# Patient Record
Sex: FEMALE | Race: WHITE | NOT HISPANIC OR LATINO | Employment: OTHER | URBAN - METROPOLITAN AREA
[De-identification: names, ages, dates, MRNs, and addresses within clinical notes are randomized per-mention and may not be internally consistent; named-entity substitution may affect disease eponyms.]

---

## 2018-10-30 ENCOUNTER — CLINICAL SUPPORT (OUTPATIENT)
Dept: ONCOLOGY | Facility: HOSPITAL | Age: 43
End: 2018-10-30

## 2018-10-30 ENCOUNTER — HOSPITAL ENCOUNTER (OUTPATIENT)
Dept: ONCOLOGY | Facility: CLINIC | Age: 43
Setting detail: INFUSION SERIES
Discharge: HOME | End: 2018-10-30
Attending: INTERNAL MEDICINE | Admitting: INTERNAL MEDICINE

## 2018-10-30 NOTE — PROGRESS NOTES
PATIENTS ONCOLOGY RECORD LOCATED IN [facility]      Subjective     Name:  J LUIS KOWALSKI     Date:  10/30/2018  Address:  [address] [place] [address] [place] [facility]  Home:   :  1975 AGE:  43 y.o.        RECORDS OBTAINED:  Patients Oncology Record is located in [facility]

## 2018-11-06 ENCOUNTER — HOSPITAL ENCOUNTER (OUTPATIENT)
Dept: ONCOLOGY | Facility: CLINIC | Age: 43
Setting detail: INFUSION SERIES
Discharge: HOME | End: 2018-11-06
Attending: INTERNAL MEDICINE | Admitting: INTERNAL MEDICINE

## 2018-11-06 ENCOUNTER — CLINICAL SUPPORT (OUTPATIENT)
Dept: ONCOLOGY | Facility: HOSPITAL | Age: 43
End: 2018-11-06

## 2018-11-06 ENCOUNTER — HOSPITAL ENCOUNTER (OUTPATIENT)
Dept: GENERAL RADIOLOGY | Facility: HOSPITAL | Age: 43
Discharge: HOME | End: 2018-11-06
Attending: INTERNAL MEDICINE | Admitting: INTERNAL MEDICINE

## 2018-11-06 ENCOUNTER — HOSPITAL ENCOUNTER (OUTPATIENT)
Dept: ONCOLOGY | Facility: HOSPITAL | Age: 43
Discharge: HOME | End: 2018-11-06
Attending: INTERNAL MEDICINE | Admitting: INTERNAL MEDICINE

## 2018-11-06 LAB
LDH SERPL-CCNC: 132 IU/L (ref 98–192)
URATE SERPL-MCNC: 6.5 MG/DL (ref 2.6–8)

## 2018-11-20 ENCOUNTER — CLINICAL SUPPORT (OUTPATIENT)
Dept: ONCOLOGY | Facility: HOSPITAL | Age: 43
End: 2018-11-20

## 2018-11-20 ENCOUNTER — HOSPITAL ENCOUNTER (OUTPATIENT)
Dept: ONCOLOGY | Facility: CLINIC | Age: 43
Setting detail: INFUSION SERIES
Discharge: HOME | End: 2018-11-20
Attending: INTERNAL MEDICINE | Admitting: INTERNAL MEDICINE

## 2018-11-20 NOTE — PROGRESS NOTES
PATIENTS ONCOLOGY RECORD LOCATED IN [facility]      Subjective     Name:  J LUIS KOWALSKI     Date:  2018  Address:  [address] [place], [place] [address] [facility] [place] [address]  Home: [unfilled]  :  1975 AGE:  43 y.o.        RECORDS OBTAINED:  Patients Oncology Record is located in [facility]

## 2018-12-11 ENCOUNTER — HOSPITAL ENCOUNTER (OUTPATIENT)
Dept: ONCOLOGY | Facility: CLINIC | Age: 43
Setting detail: INFUSION SERIES
Discharge: HOME | End: 2018-12-11
Attending: INTERNAL MEDICINE | Admitting: INTERNAL MEDICINE

## 2018-12-11 ENCOUNTER — CLINICAL SUPPORT (OUTPATIENT)
Dept: ONCOLOGY | Facility: HOSPITAL | Age: 43
End: 2018-12-11

## 2018-12-11 NOTE — PROGRESS NOTES
PATIENTS ONCOLOGY RECORD LOCATED IN [facility]      Subjective     Name:  J LUIS KOWALSKI     Date:  2018  Address:  [address] [place], [place] [address] [facility] [place] [address]  Home: [unfilled]  :  1975 AGE:  43 y.o.        RECORDS OBTAINED:  Patients Oncology Record is located in [facility]

## 2018-12-14 ENCOUNTER — HOSPITAL ENCOUNTER (OUTPATIENT)
Dept: GASTROENTEROLOGY | Facility: HOSPITAL | Age: 43
Setting detail: HOSPITAL OUTPATIENT SURGERY
Discharge: HOME | End: 2018-12-14
Attending: INTERNAL MEDICINE | Admitting: INTERNAL MEDICINE

## 2018-12-14 LAB
AFB STAIN: NORMAL
B PERT DNA SPEC QL NAA+PROBE: NOT DETECTED
BACTERIA SPEC AEROBE CULT: NORMAL
C PNEUM DNA NPH QL NAA+NON-PROBE: NOT DETECTED
CONCENTRATED SMEAR: NORMAL
CONV DIRECT SMEAR: NORMAL
CONV GRANULOCYTES, FLUID: 76 %
CONV MONOCYTES, FLUID: 6 %
CONV RESPNL SPECIMEN: ABNORMAL
FLUAV H1 2009 PAND RNA NPH QL NAA+PROBE: NOT DETECTED
FLUAV H1 HA GENE NPH QL NAA+PROBE: NOT DETECTED
FLUAV H3 RNA NPH QL NAA+PROBE: DETECTED
FLUAV SUBTYP SPEC NAA+PROBE: DETECTED
FLUBV RNA ISLT QL NAA+PROBE: NOT DETECTED
GLUCOSE BLD-MCNC: 305 MG/DL (ref 70–105)
GRAM STN SPEC: NORMAL
HADV DNA SPEC NAA+PROBE: NOT DETECTED
HCOV 229E RNA SPEC QL NAA+PROBE: NOT DETECTED
HCOV HKU1 RNA SPEC QL NAA+PROBE: NOT DETECTED
HCOV NL63 RNA SPEC QL NAA+PROBE: NOT DETECTED
HCOV OC43 RNA SPEC QL NAA+PROBE: DETECTED
HMPV RNA NPH QL NAA+NON-PROBE: NOT DETECTED
HPIV1 RNA ISLT QL NAA+PROBE: NOT DETECTED
HPIV2 RNA SPEC QL NAA+PROBE: NOT DETECTED
HPIV3 RNA NPH QL NAA+PROBE: NOT DETECTED
HPIV4 P GENE NPH QL NAA+PROBE: NOT DETECTED
LYMPHOCYTES NFR FLD MANUAL: 15 %
Lab: NORMAL
M PNEUMO IGG SER IA-ACNC: NOT DETECTED
MESOTHL CELL NFR FLD MANUAL: 3 %
MICRO REPORT STATUS: NORMAL
RHINOVIRUS RNA SPEC NAA+PROBE: NOT DETECTED
RSV RNA NPH QL NAA+NON-PROBE: NOT DETECTED
SPECIMEN SOURCE: NORMAL

## 2018-12-16 LAB
CMV DNA CSF QL NAA+PROBE: NEGATIVE
SPECIMEN SOURCE: NORMAL

## 2018-12-17 LAB
CYTOLOGY SPECIMEN: NORMAL
P JIROVECII DNA # SPEC NAA+PROBE: NEGATIVE {COPIES}/ML
SPECIMEN SOURCE: NORMAL

## 2019-01-03 ENCOUNTER — HOSPITAL ENCOUNTER (OUTPATIENT)
Dept: ONCOLOGY | Facility: CLINIC | Age: 44
Setting detail: INFUSION SERIES
Discharge: HOME | End: 2019-01-03
Attending: INTERNAL MEDICINE | Admitting: INTERNAL MEDICINE

## 2019-01-03 ENCOUNTER — CLINICAL SUPPORT (OUTPATIENT)
Dept: ONCOLOGY | Facility: HOSPITAL | Age: 44
End: 2019-01-03

## 2019-01-03 NOTE — PROGRESS NOTES
PATIENTS ONCOLOGY RECORD LOCATED IN [facility]      Subjective     Name:  J LUIS KOWALSKI     Date:  2019  Address:  [address] [place], [place] [address] [facility] [place] [address]  Home: [unfilled]  :  1975 AGE:  43 y.o.        RECORDS OBTAINED:  Patients Oncology Record is located in [facility]

## 2019-01-06 ENCOUNTER — HOSPITAL ENCOUNTER (OUTPATIENT)
Dept: LAB | Facility: HOSPITAL | Age: 44
Discharge: HOME | End: 2019-01-06
Attending: INTERNAL MEDICINE | Admitting: INTERNAL MEDICINE

## 2019-01-06 LAB
BASOPHILS # BLD AUTO: 0.5 10*3/UL (ref 0–0.2)
BASOPHILS NFR BLD AUTO: 3 % (ref 0–2)
CONV ABS BANDS: 0.5 10*3/UL
CONV ABS IMM GRAN: 0.32 10*3/UL
CONV PLATELETS GIANT IN BLOOD BY LIGHT MICROSCOPY: (no result)
DIFFERENTIAL METHOD BLD: (no result)
EOSINOPHIL # BLD AUTO: 0.6 10*3/UL (ref 0–0.3)
EOSINOPHIL # BLD AUTO: 4 % (ref 0–3)
ERYTHROCYTE [DISTWIDTH] IN BLOOD BY AUTOMATED COUNT: 14.6 % (ref 11.5–14.5)
HCT VFR BLD AUTO: 36 % (ref 35–49)
HGB BLD-MCNC: 12.3 G/DL (ref 12–15)
LYMPHOCYTES # BLD AUTO: 3.4 10*3/UL (ref 0.8–4.8)
LYMPHOCYTES NFR BLD AUTO: 21 % (ref 18–42)
MCH RBC QN AUTO: 27.5 PG (ref 26–32)
MCHC RBC AUTO-ENTMCNC: 34.2 G/DL (ref 32–36)
MCV RBC AUTO: 80.6 FL (ref 80–94)
MONOCYTES # BLD AUTO: 0.2 10*3/UL (ref 0.1–1.3)
MONOCYTES NFR BLD AUTO: 1 % (ref 2–11)
MYELOCYTES NFR BLD MANUAL: 2 %
NEUTROPHILS # BLD AUTO: 10.5 10*3/UL (ref 2.3–8.6)
NEUTROPHILS NFR BLD AUTO: 66 % (ref 50–75)
NEUTS BAND NFR BLD MANUAL: 3 % (ref 0–5)
NRBC BLD AUTO-RTO: 1 /100{WBCS}
PATHOLOGIST REVIEW: (no result)
PLATELET # BLD AUTO: (no result) 10*3/UL
PLATELET # BLD AUTO: (no result) 10*3/UL (ref 150–450)
PLT COMMENT: (no result)
PMV BLD AUTO: 9.8 FL (ref 7.4–10.4)
RBC # BLD AUTO: 4.47 10*6/UL (ref 4–5.4)
WBC # BLD AUTO: 16 10*3/UL (ref 4.5–11.5)
WBC COMMENT: (no result)

## 2019-01-07 ENCOUNTER — HOSPITAL ENCOUNTER (OUTPATIENT)
Dept: ONCOLOGY | Facility: CLINIC | Age: 44
Setting detail: INFUSION SERIES
Discharge: HOME | End: 2019-01-07
Attending: INTERNAL MEDICINE | Admitting: INTERNAL MEDICINE

## 2019-01-07 ENCOUNTER — CLINICAL SUPPORT (OUTPATIENT)
Dept: ONCOLOGY | Facility: HOSPITAL | Age: 44
End: 2019-01-07

## 2019-01-07 NOTE — PROGRESS NOTES
PATIENTS ONCOLOGY RECORD LOCATED IN [employer]      Subjective     Name:  J LUIS KOWALSKI     Date:  2019  Address:  [facility] [place] [address]  Home: [unfilled]  :  1975 AGE:  43 y.o.        RECORDS OBTAINED:  Patients Oncology Record is located in [employer]

## 2019-01-11 ENCOUNTER — HOSPITAL ENCOUNTER (OUTPATIENT)
Dept: ONCOLOGY | Facility: HOSPITAL | Age: 44
Discharge: HOME | End: 2019-01-11
Attending: INTERNAL MEDICINE | Admitting: INTERNAL MEDICINE

## 2019-01-11 ENCOUNTER — CLINICAL SUPPORT (OUTPATIENT)
Dept: ONCOLOGY | Facility: HOSPITAL | Age: 44
End: 2019-01-11

## 2019-01-11 ENCOUNTER — HOSPITAL ENCOUNTER (OUTPATIENT)
Dept: ONCOLOGY | Facility: CLINIC | Age: 44
Setting detail: INFUSION SERIES
Discharge: HOME | End: 2019-01-11
Attending: INTERNAL MEDICINE | Admitting: INTERNAL MEDICINE

## 2019-01-11 NOTE — PROGRESS NOTES
PATIENTS ONCOLOGY RECORD LOCATED IN [employer]      Subjective     Name:  J LUIS KOWALSKI     Date:  2019  Address:  [facility] [place] [address]  Home: [unfilled]  :  1975 AGE:  43 y.o.        RECORDS OBTAINED:  Patients Oncology Record is located in [employer]

## 2019-01-13 ENCOUNTER — HOSPITAL ENCOUNTER (OUTPATIENT)
Dept: LAB | Facility: HOSPITAL | Age: 44
Discharge: HOME | End: 2019-01-13
Attending: INTERNAL MEDICINE | Admitting: INTERNAL MEDICINE

## 2019-01-13 LAB
BASOPHILS # BLD AUTO: 0.1 10*3/UL (ref 0–0.2)
BASOPHILS NFR BLD AUTO: 1 % (ref 0–2)
DIFFERENTIAL METHOD BLD: (no result)
EOSINOPHIL # BLD AUTO: 0.1 10*3/UL (ref 0–0.3)
EOSINOPHIL # BLD AUTO: 1 % (ref 0–3)
ERYTHROCYTE [DISTWIDTH] IN BLOOD BY AUTOMATED COUNT: 14.8 % (ref 11.5–14.5)
HCT VFR BLD AUTO: 35.2 % (ref 35–49)
HGB BLD-MCNC: 11.4 G/DL (ref 12–15)
LYMPHOCYTES # BLD AUTO: 2.6 10*3/UL (ref 0.8–4.8)
LYMPHOCYTES NFR BLD AUTO: 28 % (ref 18–42)
MCH RBC QN AUTO: 26.7 PG (ref 26–32)
MCHC RBC AUTO-ENTMCNC: 32.5 G/DL (ref 32–36)
MCV RBC AUTO: 82.4 FL (ref 80–94)
MONOCYTES # BLD AUTO: 0.1 10*3/UL (ref 0.1–1.3)
MONOCYTES NFR BLD AUTO: 2 % (ref 2–11)
NEUTROPHILS # BLD AUTO: 6.3 10*3/UL (ref 2.3–8.6)
NEUTROPHILS NFR BLD AUTO: 68 % (ref 50–75)
NRBC BLD AUTO-RTO: 0 /100{WBCS}
NRBC/RBC NFR BLD MANUAL: 0 10*3/UL
PLATELET # BLD AUTO: (no result) 10*3/UL
PLATELET # BLD AUTO: (no result) 10*3/UL
PLATELET # BLD AUTO: (no result) 10*3/UL (ref 150–450)
PMV BLD AUTO: 9.8 FL (ref 7.4–10.4)
RBC # BLD AUTO: 4.27 10*6/UL (ref 4–5.4)
WBC # BLD AUTO: 9.2 10*3/UL (ref 4.5–11.5)

## 2019-01-17 ENCOUNTER — HOSPITAL ENCOUNTER (OUTPATIENT)
Dept: ONCOLOGY | Facility: HOSPITAL | Age: 44
Discharge: HOME | End: 2019-01-17
Attending: INTERNAL MEDICINE | Admitting: INTERNAL MEDICINE

## 2019-01-17 ENCOUNTER — CLINICAL SUPPORT (OUTPATIENT)
Dept: ONCOLOGY | Facility: HOSPITAL | Age: 44
End: 2019-01-17

## 2019-01-17 ENCOUNTER — HOSPITAL ENCOUNTER (OUTPATIENT)
Dept: ONCOLOGY | Facility: CLINIC | Age: 44
Setting detail: INFUSION SERIES
Discharge: HOME | End: 2019-01-17
Attending: INTERNAL MEDICINE | Admitting: INTERNAL MEDICINE

## 2019-01-17 LAB
ALBUMIN SERPL-MCNC: 3.7 G/DL (ref 3.5–4.8)
ALBUMIN/GLOB SERPL: 1.1 {RATIO} (ref 1–1.7)
ALP SERPL-CCNC: 67 IU/L (ref 32–91)
ALT SERPL-CCNC: 21 IU/L (ref 14–54)
ANION GAP SERPL CALC-SCNC: 15.6 MMOL/L (ref 10–20)
AST SERPL-CCNC: 19 IU/L (ref 15–41)
BILIRUB SERPL-MCNC: 1 MG/DL (ref 0.3–1.2)
BUN SERPL-MCNC: 6 MG/DL (ref 8–20)
BUN/CREAT SERPL: 10 (ref 5.4–26.2)
CALCIUM SERPL-MCNC: 9.3 MG/DL (ref 8.9–10.3)
CHLORIDE SERPL-SCNC: 99 MMOL/L (ref 101–111)
CONV CO2: 21 MMOL/L (ref 22–32)
CONV TOTAL PROTEIN: 7.1 G/DL (ref 6.1–7.9)
CREAT UR-MCNC: 0.6 MG/DL (ref 0.4–1)
GLOBULIN UR ELPH-MCNC: 3.4 G/DL (ref 2.5–3.8)
GLUCOSE SERPL-MCNC: 237 MG/DL (ref 65–99)
MAGNESIUM SERPL-MCNC: 1.7 MG/DL (ref 1.8–2.5)
POTASSIUM SERPL-SCNC: 3.6 MMOL/L (ref 3.6–5.1)
SODIUM SERPL-SCNC: 132 MMOL/L (ref 136–144)

## 2019-01-17 NOTE — PROGRESS NOTES
PATIENTS ONCOLOGY RECORD LOCATED IN [employer]      Subjective     Name:  J LUIS KOWALSKI     Date:  2019  Address:  [facility] [place] [address]  Home: [unfilled]  :  1975 AGE:  43 y.o.        RECORDS OBTAINED:  Patients Oncology Record is located in [employer]

## 2019-01-21 ENCOUNTER — HOSPITAL ENCOUNTER (OUTPATIENT)
Dept: ONCOLOGY | Facility: CLINIC | Age: 44
Setting detail: INFUSION SERIES
Discharge: HOME | End: 2019-01-21
Attending: INTERNAL MEDICINE | Admitting: INTERNAL MEDICINE

## 2019-01-21 ENCOUNTER — HOSPITAL ENCOUNTER (OUTPATIENT)
Dept: ONCOLOGY | Facility: HOSPITAL | Age: 44
Discharge: HOME | End: 2019-01-21
Attending: INTERNAL MEDICINE | Admitting: INTERNAL MEDICINE

## 2019-01-21 ENCOUNTER — CLINICAL SUPPORT (OUTPATIENT)
Dept: ONCOLOGY | Facility: HOSPITAL | Age: 44
End: 2019-01-21

## 2019-01-21 LAB
ALBUMIN SERPL-MCNC: 3.5 G/DL (ref 3.5–4.8)
ALBUMIN/GLOB SERPL: 1.1 {RATIO} (ref 1–1.7)
ALP SERPL-CCNC: 61 IU/L (ref 32–91)
ALT SERPL-CCNC: 16 IU/L (ref 14–54)
ANION GAP SERPL CALC-SCNC: 14.8 MMOL/L (ref 10–20)
AST SERPL-CCNC: 17 IU/L (ref 15–41)
BILIRUB SERPL-MCNC: 0.9 MG/DL (ref 0.3–1.2)
BUN SERPL-MCNC: 9 MG/DL (ref 8–20)
BUN/CREAT SERPL: 12.9 (ref 5.4–26.2)
CALCIUM SERPL-MCNC: 8.9 MG/DL (ref 8.9–10.3)
CHLORIDE SERPL-SCNC: 104 MMOL/L (ref 101–111)
CONV CO2: 18 MMOL/L (ref 22–32)
CONV TOTAL PROTEIN: 6.7 G/DL (ref 6.1–7.9)
CREAT UR-MCNC: 0.7 MG/DL (ref 0.4–1)
GLOBULIN UR ELPH-MCNC: 3.2 G/DL (ref 2.5–3.8)
GLUCOSE SERPL-MCNC: 280 MG/DL (ref 65–99)
POTASSIUM SERPL-SCNC: 3.8 MMOL/L (ref 3.6–5.1)
SODIUM SERPL-SCNC: 133 MMOL/L (ref 136–144)

## 2019-01-21 NOTE — PROGRESS NOTES
PATIENTS ONCOLOGY RECORD LOCATED IN [employer]      Subjective     Name:  J LUIS KOWALSKI     Date:  2019  Address:  [facility] [place] [address]  Home: [unfilled]  :  1975 AGE:  43 y.o.        RECORDS OBTAINED:  Patients Oncology Record is located in [facility]

## 2019-01-24 ENCOUNTER — CLINICAL SUPPORT (OUTPATIENT)
Dept: ONCOLOGY | Facility: HOSPITAL | Age: 44
End: 2019-01-24

## 2019-01-24 ENCOUNTER — HOSPITAL ENCOUNTER (OUTPATIENT)
Dept: ONCOLOGY | Facility: HOSPITAL | Age: 44
Discharge: HOME | End: 2019-01-24
Attending: INTERNAL MEDICINE | Admitting: INTERNAL MEDICINE

## 2019-01-24 ENCOUNTER — HOSPITAL ENCOUNTER (OUTPATIENT)
Dept: ONCOLOGY | Facility: CLINIC | Age: 44
Setting detail: INFUSION SERIES
Discharge: HOME | End: 2019-01-24
Attending: INTERNAL MEDICINE | Admitting: INTERNAL MEDICINE

## 2019-01-24 LAB
ALBUMIN SERPL-MCNC: 3.6 G/DL (ref 3.5–4.8)
ALBUMIN/GLOB SERPL: 1.2 {RATIO} (ref 1–1.7)
ALP SERPL-CCNC: 67 IU/L (ref 32–91)
ALT SERPL-CCNC: 19 IU/L (ref 14–54)
ANION GAP SERPL CALC-SCNC: 16.4 MMOL/L (ref 10–20)
AST SERPL-CCNC: 21 IU/L (ref 15–41)
BILIRUB SERPL-MCNC: 1.1 MG/DL (ref 0.3–1.2)
BUN SERPL-MCNC: 7 MG/DL (ref 8–20)
BUN/CREAT SERPL: 11.7 (ref 5.4–26.2)
CALCIUM SERPL-MCNC: 8.6 MG/DL (ref 8.9–10.3)
CHLORIDE SERPL-SCNC: 100 MMOL/L (ref 101–111)
CONV CO2: 20 MMOL/L (ref 22–32)
CONV TOTAL PROTEIN: 6.7 G/DL (ref 6.1–7.9)
CREAT UR-MCNC: 0.6 MG/DL (ref 0.4–1)
GLOBULIN UR ELPH-MCNC: 3.1 G/DL (ref 2.5–3.8)
GLUCOSE SERPL-MCNC: 155 MG/DL (ref 65–99)
POTASSIUM SERPL-SCNC: 3.4 MMOL/L (ref 3.6–5.1)
SODIUM SERPL-SCNC: 133 MMOL/L (ref 136–144)

## 2019-01-24 NOTE — PROGRESS NOTES
PATIENTS ONCOLOGY RECORD LOCATED IN [employer]      Subjective     Name:  J LUIS KOWALSKI     Date:  2019  Address:  [facility] [place] [address]  Home: [unfilled]  :  1975 AGE:  43 y.o.        RECORDS OBTAINED:  Patients Oncology Record is located in [employer]

## 2019-01-29 ENCOUNTER — HOSPITAL ENCOUNTER (OUTPATIENT)
Dept: ONCOLOGY | Facility: CLINIC | Age: 44
Setting detail: INFUSION SERIES
Discharge: HOME | End: 2019-01-29
Attending: INTERNAL MEDICINE | Admitting: INTERNAL MEDICINE

## 2019-01-29 ENCOUNTER — CLINICAL SUPPORT (OUTPATIENT)
Dept: ONCOLOGY | Facility: HOSPITAL | Age: 44
End: 2019-01-29

## 2019-01-29 ENCOUNTER — HOSPITAL ENCOUNTER (OUTPATIENT)
Dept: ONCOLOGY | Facility: HOSPITAL | Age: 44
Discharge: HOME | End: 2019-01-29
Attending: INTERNAL MEDICINE | Admitting: INTERNAL MEDICINE

## 2019-01-29 LAB
ALBUMIN SERPL-MCNC: 3.7 G/DL (ref 3.5–4.8)
ALBUMIN/GLOB SERPL: 1.2 {RATIO} (ref 1–1.7)
ALP SERPL-CCNC: 63 IU/L (ref 32–91)
ALT SERPL-CCNC: 17 IU/L (ref 14–54)
ANION GAP SERPL CALC-SCNC: 15.6 MMOL/L (ref 10–20)
AST SERPL-CCNC: 18 IU/L (ref 15–41)
BILIRUB SERPL-MCNC: 1 MG/DL (ref 0.3–1.2)
BUN SERPL-MCNC: 5 MG/DL (ref 8–20)
BUN/CREAT SERPL: 8.3 (ref 5.4–26.2)
CALCIUM SERPL-MCNC: 9.1 MG/DL (ref 8.9–10.3)
CHLORIDE SERPL-SCNC: 102 MMOL/L (ref 101–111)
CONV CO2: 19 MMOL/L (ref 22–32)
CONV TOTAL PROTEIN: 6.8 G/DL (ref 6.1–7.9)
CREAT UR-MCNC: 0.6 MG/DL (ref 0.4–1)
GLOBULIN UR ELPH-MCNC: 3.1 G/DL (ref 2.5–3.8)
GLUCOSE SERPL-MCNC: 249 MG/DL (ref 65–99)
POTASSIUM SERPL-SCNC: 3.6 MMOL/L (ref 3.6–5.1)
SODIUM SERPL-SCNC: 133 MMOL/L (ref 136–144)

## 2019-01-29 NOTE — PROGRESS NOTES
PATIENTS ONCOLOGY RECORD LOCATED IN [employer]      Subjective     Name:  J LUIS KOWALSKI     Date:  2019  Address:  [facility] [place] [address]  Home: [unfilled]  :  1975 AGE:  43 y.o.        RECORDS OBTAINED:  Patients Oncology Record is located in [employer]

## 2019-01-31 ENCOUNTER — HOSPITAL ENCOUNTER (OUTPATIENT)
Dept: ONCOLOGY | Facility: CLINIC | Age: 44
Setting detail: INFUSION SERIES
Discharge: HOME | End: 2019-01-31
Attending: INTERNAL MEDICINE | Admitting: INTERNAL MEDICINE

## 2019-01-31 ENCOUNTER — CLINICAL SUPPORT (OUTPATIENT)
Dept: ONCOLOGY | Facility: HOSPITAL | Age: 44
End: 2019-01-31

## 2019-01-31 NOTE — PROGRESS NOTES
PATIENTS ONCOLOGY RECORD LOCATED IN [employer]      Subjective     Name:  J LUIS KOWALSKI     Date:  2019  Address:  [facility] [place] [address]  Home: [unfilled]  :  1975 AGE:  43 y.o.        RECORDS OBTAINED:  Patients Oncology Record is located in [employer]

## 2019-02-04 ENCOUNTER — HOSPITAL ENCOUNTER (OUTPATIENT)
Dept: ONCOLOGY | Facility: CLINIC | Age: 44
Setting detail: INFUSION SERIES
Discharge: HOME | End: 2019-02-04
Attending: INTERNAL MEDICINE | Admitting: INTERNAL MEDICINE

## 2019-02-04 ENCOUNTER — CLINICAL SUPPORT (OUTPATIENT)
Dept: ONCOLOGY | Facility: HOSPITAL | Age: 44
End: 2019-02-04

## 2019-02-04 NOTE — PROGRESS NOTES
PATIENTS ONCOLOGY RECORD LOCATED IN [employer]      Subjective     Name:  J LUIS KOWALSKI     Date:  2019  Address:  [facility] [place] [address]  Home: [unfilled]  :  1975 AGE:  43 y.o.        RECORDS OBTAINED:  Patients Oncology Record is located in [employer]

## 2019-02-06 ENCOUNTER — CLINICAL SUPPORT (OUTPATIENT)
Dept: ONCOLOGY | Facility: HOSPITAL | Age: 44
End: 2019-02-06

## 2019-02-06 ENCOUNTER — HOSPITAL ENCOUNTER (OUTPATIENT)
Dept: LAB | Facility: HOSPITAL | Age: 44
Discharge: HOME | End: 2019-02-06
Attending: NURSE PRACTITIONER | Admitting: NURSE PRACTITIONER

## 2019-02-06 ENCOUNTER — HOSPITAL ENCOUNTER (OUTPATIENT)
Dept: ONCOLOGY | Facility: CLINIC | Age: 44
Setting detail: INFUSION SERIES
Discharge: HOME | End: 2019-02-06
Attending: INTERNAL MEDICINE | Admitting: INTERNAL MEDICINE

## 2019-02-06 NOTE — PROGRESS NOTES
PATIENTS ONCOLOGY RECORD LOCATED IN [employer]      Subjective     Name:  J LUIS KOWALSKI     Date:  2019  Address:  [facility] [place] [address]  Home: [unfilled]  :  1975 AGE:  43 y.o.        RECORDS OBTAINED:  Patients Oncology Record is located in [employer]

## 2019-02-07 ENCOUNTER — HOSPITAL ENCOUNTER (OUTPATIENT)
Dept: CT IMAGING | Facility: HOSPITAL | Age: 44
Discharge: HOME | End: 2019-02-07
Attending: INTERNAL MEDICINE | Admitting: INTERNAL MEDICINE

## 2019-02-11 ENCOUNTER — CLINICAL SUPPORT (OUTPATIENT)
Dept: ONCOLOGY | Facility: HOSPITAL | Age: 44
End: 2019-02-11

## 2019-02-11 ENCOUNTER — HOSPITAL ENCOUNTER (OUTPATIENT)
Dept: ONCOLOGY | Facility: CLINIC | Age: 44
Setting detail: INFUSION SERIES
Discharge: HOME | End: 2019-02-11
Attending: INTERNAL MEDICINE | Admitting: INTERNAL MEDICINE

## 2019-02-11 NOTE — PROGRESS NOTES
PATIENTS ONCOLOGY RECORD LOCATED IN [employer]      Subjective     Name:  J LUIS KOWALSKI     Date:  2019  Address:  [facility] [place] [address]  Home: [unfilled]  :  1975 AGE:  43 y.o.        RECORDS OBTAINED:  Patients Oncology Record is located in [employer]

## 2019-02-13 ENCOUNTER — HOSPITAL ENCOUNTER (OUTPATIENT)
Dept: ONCOLOGY | Facility: CLINIC | Age: 44
Setting detail: INFUSION SERIES
Discharge: HOME | End: 2019-02-13
Attending: INTERNAL MEDICINE | Admitting: INTERNAL MEDICINE

## 2019-02-13 ENCOUNTER — CLINICAL SUPPORT (OUTPATIENT)
Dept: ONCOLOGY | Facility: HOSPITAL | Age: 44
End: 2019-02-13

## 2019-02-13 NOTE — PROGRESS NOTES
PATIENTS ONCOLOGY RECORD LOCATED IN [employer]      Subjective     Name:  J LUIS KOWALSKI     Date:  2019  Address:  [facility] [place] [address]  Home: [unfilled]  :  1975 AGE:  43 y.o.        RECORDS OBTAINED:  Patients Oncology Record is located in [email or web address]

## 2019-02-22 ENCOUNTER — HOSPITAL ENCOUNTER (OUTPATIENT)
Dept: ONCOLOGY | Facility: CLINIC | Age: 44
Setting detail: INFUSION SERIES
Discharge: HOME | End: 2019-02-22
Attending: INTERNAL MEDICINE | Admitting: INTERNAL MEDICINE

## 2019-02-22 ENCOUNTER — HOSPITAL ENCOUNTER (OUTPATIENT)
Dept: ONCOLOGY | Facility: HOSPITAL | Age: 44
Discharge: HOME | End: 2019-02-22
Attending: INTERNAL MEDICINE | Admitting: INTERNAL MEDICINE

## 2019-02-22 ENCOUNTER — CLINICAL SUPPORT (OUTPATIENT)
Dept: ONCOLOGY | Facility: HOSPITAL | Age: 44
End: 2019-02-22

## 2019-02-22 LAB
ALBUMIN SERPL-MCNC: 3.8 G/DL (ref 3.5–4.8)
ALBUMIN/GLOB SERPL: 1.4 {RATIO} (ref 1–1.7)
ALP SERPL-CCNC: 70 IU/L (ref 32–91)
ALT SERPL-CCNC: 21 IU/L (ref 14–54)
ANION GAP SERPL CALC-SCNC: 16.8 MMOL/L (ref 10–20)
AST SERPL-CCNC: 18 IU/L (ref 15–41)
BILIRUB SERPL-MCNC: 2.3 MG/DL (ref 0.3–1.2)
BUN SERPL-MCNC: 7 MG/DL (ref 8–20)
BUN/CREAT SERPL: 11.7 (ref 5.4–26.2)
CALCIUM SERPL-MCNC: 9.5 MG/DL (ref 8.9–10.3)
CHLORIDE SERPL-SCNC: 100 MMOL/L (ref 101–111)
CONV CO2: 21 MMOL/L (ref 22–32)
CONV TOTAL PROTEIN: 6.6 G/DL (ref 6.1–7.9)
CREAT UR-MCNC: 0.6 MG/DL (ref 0.4–1)
GLOBULIN UR ELPH-MCNC: 2.8 G/DL (ref 2.5–3.8)
GLUCOSE SERPL-MCNC: 363 MG/DL (ref 65–99)
POTASSIUM SERPL-SCNC: 3.8 MMOL/L (ref 3.6–5.1)
SODIUM SERPL-SCNC: 134 MMOL/L (ref 136–144)

## 2019-02-22 NOTE — PROGRESS NOTES
PATIENTS ONCOLOGY RECORD LOCATED IN [employer]      Subjective     Name:  J LUIS KOWALSKI     Date:  2019  Address:  [facility] [place] [address]  Home: [unfilled]  :  1975 AGE:  43 y.o.        RECORDS OBTAINED:  Patients Oncology Record is located in [email or web address]

## 2019-03-13 ENCOUNTER — HOSPITAL ENCOUNTER (OUTPATIENT)
Dept: ONCOLOGY | Facility: CLINIC | Age: 44
Setting detail: INFUSION SERIES
Discharge: HOME | End: 2019-03-13
Attending: INTERNAL MEDICINE | Admitting: INTERNAL MEDICINE

## 2019-03-13 ENCOUNTER — CLINICAL SUPPORT (OUTPATIENT)
Dept: ONCOLOGY | Facility: HOSPITAL | Age: 44
End: 2019-03-13

## 2019-03-13 ENCOUNTER — HOSPITAL ENCOUNTER (OUTPATIENT)
Dept: ONCOLOGY | Facility: HOSPITAL | Age: 44
Discharge: HOME | End: 2019-03-13
Attending: INTERNAL MEDICINE | Admitting: INTERNAL MEDICINE

## 2019-03-13 NOTE — PROGRESS NOTES
PATIENTS ONCOLOGY RECORD LOCATED IN [employer]      Subjective     Name:  J LUIS KOWALSKI     Date:  2019  Address:  [facility] [place] [address]  Home: [unfilled]  :  1975 AGE:  43 y.o.        RECORDS OBTAINED:  Patients Oncology Record is located in [facility]

## 2019-03-27 ENCOUNTER — CLINICAL SUPPORT (OUTPATIENT)
Dept: ONCOLOGY | Facility: HOSPITAL | Age: 44
End: 2019-03-27

## 2019-03-27 ENCOUNTER — HOSPITAL ENCOUNTER (OUTPATIENT)
Dept: ONCOLOGY | Facility: HOSPITAL | Age: 44
Discharge: HOME | End: 2019-03-27
Attending: INTERNAL MEDICINE | Admitting: INTERNAL MEDICINE

## 2019-03-27 ENCOUNTER — HOSPITAL ENCOUNTER (OUTPATIENT)
Dept: ONCOLOGY | Facility: CLINIC | Age: 44
Setting detail: INFUSION SERIES
Discharge: HOME | End: 2019-03-27
Attending: INTERNAL MEDICINE | Admitting: INTERNAL MEDICINE

## 2019-03-27 LAB
ALBUMIN SERPL-MCNC: 3.5 G/DL (ref 3.5–4.8)
ALBUMIN/GLOB SERPL: 1.1 {RATIO} (ref 1–1.7)
ALP SERPL-CCNC: 71 IU/L (ref 32–91)
ALT SERPL-CCNC: 26 IU/L (ref 14–54)
ANION GAP SERPL CALC-SCNC: 13.7 MMOL/L (ref 10–20)
AST SERPL-CCNC: 23 IU/L (ref 15–41)
BILIRUB SERPL-MCNC: 1.8 MG/DL (ref 0.3–1.2)
BUN SERPL-MCNC: 6 MG/DL (ref 8–20)
BUN/CREAT SERPL: 12 (ref 5.4–26.2)
CALCIUM SERPL-MCNC: 9 MG/DL (ref 8.9–10.3)
CHLORIDE SERPL-SCNC: 102 MMOL/L (ref 101–111)
CONV CO2: 22 MMOL/L (ref 22–32)
CONV TOTAL PROTEIN: 6.8 G/DL (ref 6.1–7.9)
CREAT UR-MCNC: 0.5 MG/DL (ref 0.4–1)
GLOBULIN UR ELPH-MCNC: 3.3 G/DL (ref 2.5–3.8)
GLUCOSE SERPL-MCNC: 288 MG/DL (ref 65–99)
MAGNESIUM SERPL-MCNC: 1.4 MG/DL (ref 1.8–2.5)
PHOSPHATE SERPL-MCNC: 4.2 MG/DL (ref 2.4–4.7)
POTASSIUM SERPL-SCNC: 3.7 MMOL/L (ref 3.6–5.1)
SODIUM SERPL-SCNC: 134 MMOL/L (ref 136–144)

## 2019-03-27 NOTE — PROGRESS NOTES
PATIENTS ONCOLOGY RECORD LOCATED IN [employer]      Subjective     Name:  J LUIS KOWALSKI     Date:  2019  Address:  [facility] [place] [address]  Home: [unfilled]  :  1975 AGE:  43 y.o.        RECORDS OBTAINED:  Patients Oncology Record is located in [employer]

## 2019-04-01 ENCOUNTER — HOSPITAL ENCOUNTER (OUTPATIENT)
Dept: ONCOLOGY | Facility: CLINIC | Age: 44
Setting detail: INFUSION SERIES
Discharge: HOME | End: 2019-04-01
Attending: INTERNAL MEDICINE | Admitting: INTERNAL MEDICINE

## 2019-04-01 ENCOUNTER — CLINICAL SUPPORT (OUTPATIENT)
Dept: ONCOLOGY | Facility: HOSPITAL | Age: 44
End: 2019-04-01

## 2019-04-01 NOTE — PROGRESS NOTES
PATIENTS ONCOLOGY RECORD LOCATED IN [employer]      Subjective     Name:  J LUIS KOWALSKI     Date:  2019  Address:  [facility] [place] [address]  Home: [unfilled]  :  1975 AGE:  43 y.o.        RECORDS OBTAINED:  Patients Oncology Record is located in [employer]

## 2019-04-08 ENCOUNTER — CLINICAL SUPPORT (OUTPATIENT)
Dept: ONCOLOGY | Facility: HOSPITAL | Age: 44
End: 2019-04-08

## 2019-04-08 ENCOUNTER — HOSPITAL ENCOUNTER (OUTPATIENT)
Dept: ONCOLOGY | Facility: HOSPITAL | Age: 44
Discharge: HOME | End: 2019-04-08
Attending: NURSE PRACTITIONER | Admitting: NURSE PRACTITIONER

## 2019-04-08 ENCOUNTER — HOSPITAL ENCOUNTER (OUTPATIENT)
Dept: ONCOLOGY | Facility: CLINIC | Age: 44
Setting detail: INFUSION SERIES
Discharge: HOME | End: 2019-04-08
Attending: INTERNAL MEDICINE | Admitting: INTERNAL MEDICINE

## 2019-04-08 LAB — MAGNESIUM SERPL-MCNC: 1.6 MG/DL (ref 1.8–2.5)

## 2019-04-08 NOTE — PROGRESS NOTES
PATIENTS ONCOLOGY RECORD LOCATED IN [employer]      Subjective     Name:  J LUIS KOWALSKI     Date:  2019  Address:  [facility] [place] [address]  Home: [unfilled]  :  1975 AGE:  43 y.o.        RECORDS OBTAINED:  Patients Oncology Record is located in [employer]

## 2019-04-18 ENCOUNTER — HOSPITAL ENCOUNTER (OUTPATIENT)
Dept: ONCOLOGY | Facility: CLINIC | Age: 44
Setting detail: INFUSION SERIES
Discharge: HOME | End: 2019-04-18
Attending: INTERNAL MEDICINE | Admitting: INTERNAL MEDICINE

## 2019-04-18 ENCOUNTER — CLINICAL SUPPORT (OUTPATIENT)
Dept: ONCOLOGY | Facility: HOSPITAL | Age: 44
End: 2019-04-18

## 2019-04-18 ENCOUNTER — HOSPITAL ENCOUNTER (OUTPATIENT)
Dept: ONCOLOGY | Facility: HOSPITAL | Age: 44
Discharge: HOME | End: 2019-04-18
Attending: INTERNAL MEDICINE | Admitting: INTERNAL MEDICINE

## 2019-04-18 LAB
ALBUMIN SERPL-MCNC: 3.8 G/DL (ref 3.5–4.8)
ALBUMIN/GLOB SERPL: 1 {RATIO} (ref 1–1.7)
ALP SERPL-CCNC: 85 IU/L (ref 32–91)
ALT SERPL-CCNC: 26 IU/L (ref 14–54)
ANION GAP SERPL CALC-SCNC: 16.7 MMOL/L (ref 10–20)
AST SERPL-CCNC: 17 IU/L (ref 15–41)
BILIRUB SERPL-MCNC: 2.2 MG/DL (ref 0.3–1.2)
BUN SERPL-MCNC: 6 MG/DL (ref 8–20)
BUN/CREAT SERPL: 8.6 (ref 5.4–26.2)
CA-I SERPL ISE-MCNC: 1.23 MMOL/L (ref 1.2–1.3)
CALCIUM SERPL-MCNC: 9.3 MG/DL (ref 8.9–10.3)
CHLORIDE SERPL-SCNC: 102 MMOL/L (ref 101–111)
CONV CO2: 18 MMOL/L (ref 22–32)
CONV TOTAL PROTEIN: 7.6 G/DL (ref 6.1–7.9)
CREAT UR-MCNC: 0.7 MG/DL (ref 0.4–1)
GLOBULIN UR ELPH-MCNC: 3.8 G/DL (ref 2.5–3.8)
GLUCOSE SERPL-MCNC: 334 MG/DL (ref 65–99)
MAGNESIUM SERPL-MCNC: 1.7 MG/DL (ref 1.8–2.5)
PHOSPHATE SERPL-MCNC: 3.7 MG/DL (ref 2.4–4.7)
POTASSIUM SERPL-SCNC: 3.7 MMOL/L (ref 3.6–5.1)
SODIUM SERPL-SCNC: 133 MMOL/L (ref 136–144)

## 2019-04-18 NOTE — PROGRESS NOTES
PATIENTS ONCOLOGY RECORD LOCATED IN [employer]      Subjective     Name:  J LUIS KOWALSKI     Date:  2019  Address:  [facility] [place] [address]  Home: [unfilled]  :  1975 AGE:  43 y.o.        RECORDS OBTAINED:  Patients Oncology Record is located in [employer]

## 2019-04-29 ENCOUNTER — CLINICAL SUPPORT (OUTPATIENT)
Dept: ONCOLOGY | Facility: HOSPITAL | Age: 44
End: 2019-04-29

## 2019-04-29 ENCOUNTER — HOSPITAL ENCOUNTER (OUTPATIENT)
Dept: ONCOLOGY | Facility: CLINIC | Age: 44
Setting detail: INFUSION SERIES
Discharge: HOME | End: 2019-04-29
Attending: INTERNAL MEDICINE | Admitting: INTERNAL MEDICINE

## 2019-04-29 NOTE — PROGRESS NOTES
PATIENTS ONCOLOGY RECORD LOCATED IN [employer]      Subjective     Name:  J LUIS KOWALSKI     Date:  2019  Address:  [facility] [place] [address]  Home: [unfilled]  :  1975 AGE:  43 y.o.        RECORDS OBTAINED:  Patients Oncology Record is located in [employer]

## 2019-05-07 ENCOUNTER — CLINICAL SUPPORT (OUTPATIENT)
Dept: ONCOLOGY | Facility: HOSPITAL | Age: 44
End: 2019-05-07

## 2019-05-07 ENCOUNTER — HOSPITAL ENCOUNTER (OUTPATIENT)
Dept: ONCOLOGY | Facility: CLINIC | Age: 44
Setting detail: INFUSION SERIES
Discharge: HOME | End: 2019-05-07
Attending: INTERNAL MEDICINE | Admitting: INTERNAL MEDICINE

## 2019-05-07 NOTE — PROGRESS NOTES
PATIENTS ONCOLOGY RECORD LOCATED IN [employer]      Subjective     Name:  J LUIS KOWALSKI     Date:  2019  Address:  [facility] [place] [address]  Home: [unfilled]  :  1975 AGE:  43 y.o.        RECORDS OBTAINED:  Patients Oncology Record is located in [facility]

## 2019-05-21 ENCOUNTER — CLINICAL SUPPORT (OUTPATIENT)
Dept: ONCOLOGY | Facility: HOSPITAL | Age: 44
End: 2019-05-21

## 2019-05-21 ENCOUNTER — HOSPITAL ENCOUNTER (OUTPATIENT)
Dept: ONCOLOGY | Facility: CLINIC | Age: 44
Setting detail: INFUSION SERIES
Discharge: HOME | End: 2019-05-21
Attending: INTERNAL MEDICINE | Admitting: INTERNAL MEDICINE

## 2019-05-21 NOTE — PROGRESS NOTES
PATIENTS ONCOLOGY RECORD LOCATED IN [facility]      Subjective     Name:  J LUIS KOWALSKI     Date:  2019  Address:  [facility] [place] [address]  Home: [unfilled]  :  1975 AGE:  43 y.o.        RECORDS OBTAINED:  Patients Oncology [facility]

## 2019-06-30 ENCOUNTER — HOSPITAL ENCOUNTER (EMERGENCY)
Facility: HOSPITAL | Age: 44
Discharge: HOME | End: 2019-06-30
Admitting: EMERGENCY MEDICINE

## 2019-06-30 VITALS
TEMPERATURE: 98.3 F | DIASTOLIC BLOOD PRESSURE: 78 MMHG | SYSTOLIC BLOOD PRESSURE: 108 MMHG | HEART RATE: 90 BPM | OXYGEN SATURATION: 98 % | BODY MASS INDEX: 28.79 KG/M2 | RESPIRATION RATE: 18 BRPM | WEIGHT: 168.65 LBS | HEIGHT: 64 IN

## 2019-06-30 DIAGNOSIS — N39.0 URINARY TRACT INFECTION WITH HEMATURIA, SITE UNSPECIFIED: Primary | ICD-10-CM

## 2019-06-30 DIAGNOSIS — R31.9 URINARY TRACT INFECTION WITH HEMATURIA, SITE UNSPECIFIED: Primary | ICD-10-CM

## 2019-06-30 DIAGNOSIS — R30.0 DYSURIA: ICD-10-CM

## 2019-06-30 LAB
B-HCG UR QL: NEGATIVE
BACTERIA UR QL AUTO: ABNORMAL /HPF
BILIRUB UR QL STRIP: NEGATIVE
C TRACH RRNA CVX QL NAA+PROBE: NOT DETECTED
CLARITY UR: CLEAR
CLUE CELLS SPEC QL WET PREP: ABNORMAL
COLOR UR: YELLOW
GLUCOSE UR STRIP-MCNC: ABNORMAL MG/DL
HGB UR QL STRIP.AUTO: ABNORMAL
HYALINE CASTS UR QL AUTO: ABNORMAL /LPF
HYDATID CYST SPEC WET PREP: ABNORMAL
KETONES UR QL STRIP: NEGATIVE
LEUKOCYTE ESTERASE UR QL STRIP.AUTO: NEGATIVE
N GONORRHOEA RRNA SPEC QL NAA+PROBE: NOT DETECTED
NITRITE UR QL STRIP: NEGATIVE
PH UR STRIP.AUTO: 5.5 [PH] (ref 5–8)
PROT UR QL STRIP: NEGATIVE
RBC # UR: ABNORMAL /HPF
REF LAB TEST METHOD: ABNORMAL
SP GR UR STRIP: 1.04 (ref 1–1.03)
SQUAMOUS #/AREA URNS HPF: ABNORMAL /HPF
T VAGINALIS SPEC QL WET PREP: ABNORMAL
UROBILINOGEN UR QL STRIP: ABNORMAL
WBC SPEC QL WET PREP: ABNORMAL
WBC UR QL AUTO: ABNORMAL /HPF
YEAST GENITAL QL WET PREP: ABNORMAL

## 2019-06-30 PROCEDURE — 87086 URINE CULTURE/COLONY COUNT: CPT | Performed by: EMERGENCY MEDICINE

## 2019-06-30 PROCEDURE — 87491 CHLMYD TRACH DNA AMP PROBE: CPT | Performed by: PHYSICIAN ASSISTANT

## 2019-06-30 PROCEDURE — 87147 CULTURE TYPE IMMUNOLOGIC: CPT | Performed by: EMERGENCY MEDICINE

## 2019-06-30 PROCEDURE — 99283 EMERGENCY DEPT VISIT LOW MDM: CPT

## 2019-06-30 PROCEDURE — 81001 URINALYSIS AUTO W/SCOPE: CPT | Performed by: EMERGENCY MEDICINE

## 2019-06-30 PROCEDURE — 87210 SMEAR WET MOUNT SALINE/INK: CPT | Performed by: PHYSICIAN ASSISTANT

## 2019-06-30 PROCEDURE — 81025 URINE PREGNANCY TEST: CPT | Performed by: PHYSICIAN ASSISTANT

## 2019-06-30 PROCEDURE — 87591 N.GONORRHOEAE DNA AMP PROB: CPT | Performed by: PHYSICIAN ASSISTANT

## 2019-06-30 RX ORDER — CEFDINIR 300 MG/1
300 CAPSULE ORAL 2 TIMES DAILY
Qty: 14 CAPSULE | Refills: 0 | Status: SHIPPED | OUTPATIENT
Start: 2019-06-30 | End: 2021-03-11

## 2019-06-30 RX ORDER — PHENAZOPYRIDINE HYDROCHLORIDE 95 MG/1
95 TABLET ORAL 3 TIMES DAILY PRN
Qty: 15 TABLET | Refills: 0 | Status: SHIPPED | OUTPATIENT
Start: 2019-06-30 | End: 2021-03-11

## 2019-06-30 RX ORDER — SUCRALFATE 1 G/1
1 TABLET ORAL DAILY
COMMUNITY
End: 2021-03-11

## 2019-07-01 ENCOUNTER — HOSPITAL ENCOUNTER (EMERGENCY)
Facility: HOSPITAL | Age: 44
Discharge: HOME | End: 2019-07-02
Attending: EMERGENCY MEDICINE | Admitting: EMERGENCY MEDICINE

## 2019-07-01 DIAGNOSIS — R31.9 URINARY TRACT INFECTION WITH HEMATURIA, SITE UNSPECIFIED: Primary | ICD-10-CM

## 2019-07-01 DIAGNOSIS — N39.0 URINARY TRACT INFECTION WITH HEMATURIA, SITE UNSPECIFIED: Primary | ICD-10-CM

## 2019-07-01 DIAGNOSIS — Z79.4 TYPE 2 DIABETES MELLITUS WITH HYPERGLYCEMIA, WITH LONG-TERM CURRENT USE OF INSULIN (HCC): ICD-10-CM

## 2019-07-01 DIAGNOSIS — E11.65 TYPE 2 DIABETES MELLITUS WITH HYPERGLYCEMIA, WITH LONG-TERM CURRENT USE OF INSULIN (HCC): ICD-10-CM

## 2019-07-01 LAB
ALBUMIN SERPL-MCNC: 3.3 G/DL (ref 3.5–4.8)
ALBUMIN/GLOB SERPL: 1 G/DL (ref 1–1.7)
ALP SERPL-CCNC: 74 U/L (ref 32–91)
ALT SERPL W P-5'-P-CCNC: 18 U/L (ref 14–54)
ANION GAP SERPL CALCULATED.3IONS-SCNC: 16.6 MMOL/L (ref 10–20)
AST SERPL-CCNC: 15 U/L (ref 15–41)
BACTERIA SPEC AEROBE CULT: ABNORMAL
BASOPHILS # BLD AUTO: 0.1 10*3/MM3 (ref 0–0.2)
BASOPHILS NFR BLD AUTO: 1.3 % (ref 0–1.5)
BILIRUB SERPL-MCNC: 0.8 MG/DL (ref 0.3–1.2)
BILIRUB UR QL STRIP: NEGATIVE
BUN BLD-MCNC: 6 MG/DL (ref 8–20)
BUN/CREAT SERPL: 12 (ref 5.4–26.2)
CALCIUM SPEC-SCNC: 8.8 MG/DL (ref 8.9–10.3)
CHLORIDE SERPL-SCNC: 99 MMOL/L (ref 101–111)
CLARITY UR: ABNORMAL
CO2 SERPL-SCNC: 21 MMOL/L (ref 22–32)
COLOR UR: YELLOW
CREAT BLD-MCNC: 0.5 MG/DL (ref 0.4–1)
DEPRECATED RDW RBC AUTO: 41.1 FL (ref 37–54)
EOSINOPHIL # BLD AUTO: 0 10*3/MM3 (ref 0–0.4)
EOSINOPHIL NFR BLD AUTO: 0.3 % (ref 0.3–6.2)
ERYTHROCYTE [DISTWIDTH] IN BLOOD BY AUTOMATED COUNT: 14.3 % (ref 12.3–15.4)
GFR SERPL CREATININE-BSD FRML MDRD: 135 ML/MIN/1.73
GLOBULIN UR ELPH-MCNC: 3.2 GM/DL (ref 2.5–3.8)
GLUCOSE BLD-MCNC: 349 MG/DL (ref 65–99)
GLUCOSE UR STRIP-MCNC: ABNORMAL MG/DL
HCT VFR BLD AUTO: 36.8 % (ref 34–46.6)
HGB BLD-MCNC: 12.5 G/DL (ref 12–15.9)
HGB UR QL STRIP.AUTO: ABNORMAL
KETONES UR QL STRIP: NEGATIVE
LEUKOCYTE ESTERASE UR QL STRIP.AUTO: NEGATIVE
LYMPHOCYTES # BLD AUTO: 2 10*3/MM3 (ref 0.7–3.1)
LYMPHOCYTES NFR BLD AUTO: 21.4 % (ref 19.6–45.3)
MCH RBC QN AUTO: 27.6 PG (ref 26.6–33)
MCHC RBC AUTO-ENTMCNC: 33.9 G/DL (ref 31.5–35.7)
MCV RBC AUTO: 81.4 FL (ref 79–97)
MONOCYTES # BLD AUTO: 0.6 10*3/MM3 (ref 0.1–0.9)
MONOCYTES NFR BLD AUTO: 6.5 % (ref 5–12)
NEUTROPHILS # BLD AUTO: 6.5 10*3/MM3 (ref 1.7–7)
NEUTROPHILS NFR BLD AUTO: 70.5 % (ref 42.7–76)
NITRITE UR QL STRIP: NEGATIVE
NRBC BLD AUTO-RTO: 0 /100 WBC (ref 0–0.2)
PH UR STRIP.AUTO: <=5 [PH] (ref 5–8)
PLATELET # BLD AUTO: 317 10*3/MM3 (ref 140–450)
PMV BLD AUTO: 8.8 FL (ref 6–12)
POTASSIUM BLD-SCNC: 3.6 MMOL/L (ref 3.6–5.1)
PROT SERPL-MCNC: 6.5 G/DL (ref 6.1–7.9)
PROT UR QL STRIP: NEGATIVE
RBC # BLD AUTO: 4.53 10*6/MM3 (ref 3.77–5.28)
SODIUM BLD-SCNC: 133 MMOL/L (ref 136–144)
SP GR UR STRIP: 1.04 (ref 1–1.03)
STREP GROUPING: ABNORMAL
UROBILINOGEN UR QL STRIP: ABNORMAL
WBC NRBC COR # BLD: 9.2 10*3/MM3 (ref 3.4–10.8)

## 2019-07-01 PROCEDURE — 96374 THER/PROPH/DIAG INJ IV PUSH: CPT

## 2019-07-01 PROCEDURE — 81001 URINALYSIS AUTO W/SCOPE: CPT | Performed by: EMERGENCY MEDICINE

## 2019-07-01 PROCEDURE — 25010000002 PROCHLORPERAZINE 10 MG/2ML SOLUTION: Performed by: EMERGENCY MEDICINE

## 2019-07-01 PROCEDURE — 96375 TX/PRO/DX INJ NEW DRUG ADDON: CPT

## 2019-07-01 PROCEDURE — 99283 EMERGENCY DEPT VISIT LOW MDM: CPT

## 2019-07-01 PROCEDURE — 85025 COMPLETE CBC W/AUTO DIFF WBC: CPT | Performed by: EMERGENCY MEDICINE

## 2019-07-01 PROCEDURE — 80053 COMPREHEN METABOLIC PANEL: CPT | Performed by: EMERGENCY MEDICINE

## 2019-07-01 PROCEDURE — 25010000002 DIPHENHYDRAMINE PER 50 MG: Performed by: EMERGENCY MEDICINE

## 2019-07-01 PROCEDURE — 87086 URINE CULTURE/COLONY COUNT: CPT | Performed by: EMERGENCY MEDICINE

## 2019-07-01 RX ORDER — DIPHENHYDRAMINE HYDROCHLORIDE 50 MG/ML
50 INJECTION INTRAMUSCULAR; INTRAVENOUS ONCE
Status: COMPLETED | OUTPATIENT
Start: 2019-07-01 | End: 2019-07-01

## 2019-07-01 RX ORDER — PROCHLORPERAZINE EDISYLATE 5 MG/ML
10 INJECTION INTRAMUSCULAR; INTRAVENOUS ONCE
Status: COMPLETED | OUTPATIENT
Start: 2019-07-01 | End: 2019-07-01

## 2019-07-01 RX ADMIN — SODIUM CHLORIDE, SODIUM LACTATE, POTASSIUM CHLORIDE, AND CALCIUM CHLORIDE 1000 ML: 600; 310; 30; 20 INJECTION, SOLUTION INTRAVENOUS at 23:14

## 2019-07-01 RX ADMIN — PROCHLORPERAZINE EDISYLATE 10 MG: 5 INJECTION INTRAMUSCULAR; INTRAVENOUS at 23:09

## 2019-07-01 RX ADMIN — DIPHENHYDRAMINE HYDROCHLORIDE 50 MG: 50 INJECTION, SOLUTION INTRAMUSCULAR; INTRAVENOUS at 23:08

## 2019-07-01 NOTE — DISCHARGE INSTRUCTIONS
Make sure to follow-up with Dr. Lerma's office in the morning to schedule a visit in regards to your current medication changes and reevaluation for the psychiatrist.     Take medications as prescribed.    Consider taking probiotic or eating yogurt daily while on antibiotic and up to 10 days after to replace the good bacteria in your gastrointestinal tract; this can reduce chances of developing a GI infection such as C difficile    The ED for any new or worsening symptoms

## 2019-07-01 NOTE — PHARMACY RECOMMENDATION
Pt given cefdinir rx at discharge. Culture growing strept agalactiae (Group B) universally susceptible to PNCs. Appropriate AB given at discharge. No follow up therapy intervention needed at this time. -SAV Shepherd PharmD

## 2019-07-02 ENCOUNTER — TELEPHONE (OUTPATIENT)
Dept: ONCOLOGY | Facility: CLINIC | Age: 44
End: 2019-07-02

## 2019-07-02 VITALS
WEIGHT: 165 LBS | BODY MASS INDEX: 27.49 KG/M2 | OXYGEN SATURATION: 97 % | TEMPERATURE: 98 F | SYSTOLIC BLOOD PRESSURE: 103 MMHG | DIASTOLIC BLOOD PRESSURE: 61 MMHG | HEART RATE: 84 BPM | RESPIRATION RATE: 15 BRPM | HEIGHT: 65 IN

## 2019-07-02 LAB
BACTERIA UR QL AUTO: ABNORMAL /HPF
HYALINE CASTS UR QL AUTO: ABNORMAL /LPF
RBC # UR: ABNORMAL /HPF
REF LAB TEST METHOD: ABNORMAL
SQUAMOUS #/AREA URNS HPF: ABNORMAL /HPF
WBC UR QL AUTO: ABNORMAL /HPF

## 2019-07-02 PROCEDURE — 96375 TX/PRO/DX INJ NEW DRUG ADDON: CPT

## 2019-07-02 PROCEDURE — 25010000002 CEFTRIAXONE PER 250 MG: Performed by: EMERGENCY MEDICINE

## 2019-07-02 RX ORDER — PROCHLORPERAZINE MALEATE 10 MG
10 TABLET ORAL EVERY 6 HOURS PRN
Qty: 12 TABLET | Refills: 0 | Status: SHIPPED | OUTPATIENT
Start: 2019-07-02 | End: 2021-03-11

## 2019-07-02 RX ADMIN — CEFTRIAXONE SODIUM 1 G: 1 INJECTION, POWDER, FOR SOLUTION INTRAMUSCULAR; INTRAVENOUS at 00:14

## 2019-07-02 NOTE — DISCHARGE INSTRUCTIONS
Take antibiotic as directed  Follow your blood sugars closely  Prescription as directed  Follow-up with your primary care provider as needed  
DISPLAY PLAN FREE TEXT

## 2019-07-02 NOTE — TELEPHONE ENCOUNTER
----- Message from Meghann Lerma MD sent at 7/1/2019  8:20 AM EDT -----  Please schedule follow up appointment this week.    LM for pt to call and schedule appt w/Dr BLANCO this week.

## 2019-07-02 NOTE — ED PROVIDER NOTES
Subjective   43-year-old female complaining of nausea and vomiting.  The patient was seen yesterday at this facility and diagnosed as having a urinary tract infection.  She was prescribed Cefdinir.  The patient did not fill this prescription.  She states she is worse today with increasing lower abdominal discomfort, fever, chills.  She reports that she is currently nauseated.  She reports she vomited once.  She has not had diarrhea.  She reports no melena hematemesis or hematochezia.  She reports persistent dysuria and hematuria.  She has not checked her blood sugar today            Review of Systems   Constitutional: Positive for activity change, chills and fever.   Genitourinary: Positive for difficulty urinating, dysuria, frequency and urgency. Negative for vaginal bleeding and vaginal discharge.   Skin: Negative for color change, pallor and rash.   Psychiatric/Behavioral: Negative for confusion.       Past Medical History:   Diagnosis Date   • Diabetes mellitus (CMS/[facility])    • Leukemia (CMS/[facility]) 2019   • Pulmonary embolism (CMS/[facility])        Allergies   Allergen Reactions   • Hydromorphone GI Intolerance   • Morphine Nausea And Vomiting   • Propofol Hives       Past Surgical History:   Procedure Laterality Date   • BREAST SURGERY     • CHOLECYSTECTOMY     • TUBAL ABDOMINAL LIGATION         No family history on file.    Social History     Socioeconomic History   • Marital status:      Spouse name: Not on file   • Number of children: Not on file   • Years of education: Not on file   • Highest education level: Not on file   Tobacco Use   • Smoking status: Never Smoker   • Smokeless tobacco: Never Used   Substance and Sexual Activity   • Alcohol use: No     Frequency: Never   • Drug use: No   • Sexual activity: Defer           Objective   Physical Exam  Alert Helder Coma Scale 15   HEENT: Pupils equal and reactive to light. Conjunctivae are not injected. normal tympanic membranes. Oropharynx and nares are  normal.   Neck: Supple. Midline trachea. No JVD. No goiter.   Chest: Clear and equal breath sounds bilaterally regular rate and rhythm without murmur or rub.   Abdomen: Positive bowel sounds nontender nondistended. No rebound or peritoneal signs. No CVA tenderness.   Extremities no clubbing cyanosis or edema motor sensory exam is normal the full range of motion is intact   skin: Warm and dry, no rashes or petechia.   Lymphatic: No regional lymphadenopathy. No calf pain, swelling or Lily's sign  Procedures           ED Course  ED Course as of Jul 02 0054   Mon Jul 01, 2019   2350 Much better after medication was administered.  No additional vomiting  [TH]   Tue Jul 02, 2019   0046 The culture and sensitivity was checked and found to be group B streptococcus sensitive to penicillin type agents.  The importance of completing medication was discussed with her.  [TH]      ED Course User Index  [TH] Rao Ballesteros MD        Labs Reviewed   COMPREHENSIVE METABOLIC PANEL - Abnormal; Notable for the following components:       Result Value    Glucose 349 (*)     BUN 6 (*)     Sodium 133 (*)     Chloride 99 (*)     CO2 21.0 (*)     Calcium 8.8 (*)     Albumin 3.30 (*)     All other components within normal limits   URINALYSIS W/ CULTURE IF INDICATED - Abnormal; Notable for the following components:    Appearance, UA Cloudy (*)     Specific Gravity, UA 1.043 (*)     Glucose, UA >=1000 mg/dL (3+) (*)     Blood, UA Large (3+) (*)     All other components within normal limits   URINALYSIS, MICROSCOPIC ONLY - Abnormal; Notable for the following components:    RBC, UA 31-50 (*)     WBC, UA 6-12 (*)     Bacteria, UA Trace (*)     Squamous Epithelial Cells, UA 3-6 (*)     All other components within normal limits   CBC WITH AUTO DIFFERENTIAL - Normal   URINE CULTURE   CBC AND DIFFERENTIAL    Narrative:     The following orders were created for panel order CBC & Differential.  Procedure                               Abnormality          Status                     ---------                               -----------         ------                     CBC Auto Differential[[id number]]        Normal              Final result                 Please view results for these tests on the individual orders.     Medications   lactated ringers bolus 1,000 mL (1,000 mL Intravenous New Bag 7/1/19 2314)   prochlorperazine (COMPAZINE) injection 10 mg (10 mg Intravenous Given 7/1/19 2309)   diphenhydrAMINE (BENADRYL) injection 50 mg (50 mg Intravenous Given 7/1/19 2308)   cefTRIAXone (ROCEPHIN) 1 g in sodium chloride 0.9 % IV syringe (1 g Intravenous Given 7/2/19 0014)     No radiology results for the last day            MDM  Number of Diagnoses or Management Options     Amount and/or Complexity of Data Reviewed  Clinical lab tests: reviewed and ordered  Decide to obtain previous medical records or to obtain history from someone other than the patient: yes  Review and summarize past medical records: yes    Risk of Complications, Morbidity, and/or Mortality  Presenting problems: high  Diagnostic procedures: high  Management options: high  General comments: The patient will be covered with antiemetics.  The importance of close monitoring of blood glucose was specifically discussed.  The patient was stable at discharge and vocalized understanding of discharge instructions and warnings    Patient Progress  Patient progress: improved        Final diagnoses:   Urinary tract infection with hematuria, site unspecified   Type 2 diabetes mellitus with hyperglycemia, with long-term current use of insulin (CMS/[facility])            Rao Ballesteros MD  07/02/19 0054

## 2019-07-03 LAB — BACTERIA SPEC AEROBE CULT: NORMAL

## 2019-07-25 NOTE — TELEPHONE ENCOUNTER
Since pt was contacted several times and did not respond, Tracey RODRIGUEZ sent registered letter to pt asking her to call and schedule f/u appt.

## 2019-10-16 ENCOUNTER — APPOINTMENT (OUTPATIENT)
Dept: GENERAL RADIOLOGY | Facility: HOSPITAL | Age: 44
End: 2019-10-16

## 2019-10-16 ENCOUNTER — HOSPITAL ENCOUNTER (EMERGENCY)
Facility: HOSPITAL | Age: 44
Discharge: HOME | End: 2019-10-16
Attending: EMERGENCY MEDICINE | Admitting: EMERGENCY MEDICINE

## 2019-10-16 VITALS
TEMPERATURE: 98.4 F | HEART RATE: 85 BPM | RESPIRATION RATE: 19 BRPM | DIASTOLIC BLOOD PRESSURE: 78 MMHG | HEIGHT: 64 IN | WEIGHT: 167.55 LBS | SYSTOLIC BLOOD PRESSURE: 125 MMHG | BODY MASS INDEX: 28.6 KG/M2 | OXYGEN SATURATION: 98 %

## 2019-10-16 DIAGNOSIS — J20.9 ACUTE BRONCHITIS, UNSPECIFIED ORGANISM: ICD-10-CM

## 2019-10-16 DIAGNOSIS — R06.00 DYSPNEA, UNSPECIFIED TYPE: Primary | ICD-10-CM

## 2019-10-16 LAB
ALBUMIN SERPL-MCNC: 3.9 G/DL (ref 3.5–5.2)
ALBUMIN/GLOB SERPL: 1.3 G/DL
ALP SERPL-CCNC: 85 U/L (ref 39–117)
ALT SERPL W P-5'-P-CCNC: 15 U/L (ref 1–33)
ANION GAP SERPL CALCULATED.3IONS-SCNC: 16.7 MMOL/L (ref 5–15)
AST SERPL-CCNC: 10 U/L (ref 1–32)
B PERT DNA SPEC QL NAA+PROBE: NOT DETECTED
B-HCG UR QL: NEGATIVE
BASOPHILS # BLD AUTO: 0.1 10*3/MM3 (ref 0–0.2)
BASOPHILS NFR BLD AUTO: 1.3 % (ref 0–1.5)
BILIRUB SERPL-MCNC: 0.4 MG/DL (ref 0.2–1.2)
BUN BLD-MCNC: 8 MG/DL (ref 6–20)
BUN/CREAT SERPL: 15.4 (ref 7–25)
C PNEUM DNA NPH QL NAA+NON-PROBE: NOT DETECTED
CALCIUM SPEC-SCNC: 8.9 MG/DL (ref 8.6–10.5)
CHLORIDE SERPL-SCNC: 100 MMOL/L (ref 98–107)
CO2 SERPL-SCNC: 20 MMOL/L (ref 22–29)
CREAT BLD-MCNC: 0.52 MG/DL (ref 0.57–1)
D DIMER PPP FEU-MCNC: 0.42 MCGFEU/ML (ref 0.17–0.59)
D-LACTATE SERPL-SCNC: 0.5 MMOL/L (ref 0.5–2)
DEPRECATED RDW RBC AUTO: 40.3 FL (ref 37–54)
EOSINOPHIL # BLD AUTO: 0.1 10*3/MM3 (ref 0–0.4)
EOSINOPHIL NFR BLD AUTO: 0.8 % (ref 0.3–6.2)
ERYTHROCYTE [DISTWIDTH] IN BLOOD BY AUTOMATED COUNT: 14.4 % (ref 12.3–15.4)
FLUAV H1 2009 PAND RNA NPH QL NAA+PROBE: NOT DETECTED
FLUAV H1 HA GENE NPH QL NAA+PROBE: NOT DETECTED
FLUAV H3 RNA NPH QL NAA+PROBE: NOT DETECTED
FLUAV SUBTYP SPEC NAA+PROBE: NOT DETECTED
FLUBV RNA ISLT QL NAA+PROBE: NOT DETECTED
GFR SERPL CREATININE-BSD FRML MDRD: 128 ML/MIN/1.73
GLOBULIN UR ELPH-MCNC: 3.1 GM/DL
GLUCOSE BLD-MCNC: 321 MG/DL (ref 65–99)
HADV DNA SPEC NAA+PROBE: NOT DETECTED
HCOV 229E RNA SPEC QL NAA+PROBE: NOT DETECTED
HCOV HKU1 RNA SPEC QL NAA+PROBE: NOT DETECTED
HCOV NL63 RNA SPEC QL NAA+PROBE: NOT DETECTED
HCOV OC43 RNA SPEC QL NAA+PROBE: NOT DETECTED
HCT VFR BLD AUTO: 35.5 % (ref 34–46.6)
HGB BLD-MCNC: 12.3 G/DL (ref 12–15.9)
HMPV RNA NPH QL NAA+NON-PROBE: NOT DETECTED
HOLD SPECIMEN: NORMAL
HOLD SPECIMEN: NORMAL
HPIV1 RNA SPEC QL NAA+PROBE: NOT DETECTED
HPIV2 RNA SPEC QL NAA+PROBE: NOT DETECTED
HPIV3 RNA NPH QL NAA+PROBE: NOT DETECTED
HPIV4 P GENE NPH QL NAA+PROBE: NOT DETECTED
INR PPP: 0.98 (ref 0.9–1.1)
LYMPHOCYTES # BLD AUTO: 3 10*3/MM3 (ref 0.7–3.1)
LYMPHOCYTES NFR BLD AUTO: 32.1 % (ref 19.6–45.3)
M PNEUMO IGG SER IA-ACNC: NOT DETECTED
MCH RBC QN AUTO: 27.5 PG (ref 26.6–33)
MCHC RBC AUTO-ENTMCNC: 34.6 G/DL (ref 31.5–35.7)
MCV RBC AUTO: 79.6 FL (ref 79–97)
MONOCYTES # BLD AUTO: 0.5 10*3/MM3 (ref 0.1–0.9)
MONOCYTES NFR BLD AUTO: 5.8 % (ref 5–12)
NEUTROPHILS # BLD AUTO: 5.6 10*3/MM3 (ref 1.7–7)
NEUTROPHILS NFR BLD AUTO: 60 % (ref 42.7–76)
NRBC BLD AUTO-RTO: 0.2 /100 WBC (ref 0–0.2)
NT-PROBNP SERPL-MCNC: 25.4 PG/ML (ref 5–450)
PLATELET # BLD AUTO: 381 10*3/MM3 (ref 140–450)
PMV BLD AUTO: 9 FL (ref 6–12)
POTASSIUM BLD-SCNC: 3.7 MMOL/L (ref 3.5–5.2)
PROT SERPL-MCNC: 7 G/DL (ref 6–8.5)
PROTHROMBIN TIME: 10.3 SECONDS (ref 9.6–11.7)
RBC # BLD AUTO: 4.46 10*6/MM3 (ref 3.77–5.28)
RHINOVIRUS RNA SPEC NAA+PROBE: NOT DETECTED
RSV RNA NPH QL NAA+NON-PROBE: NOT DETECTED
SODIUM BLD-SCNC: 133 MMOL/L (ref 136–145)
TROPONIN T SERPL-MCNC: <0.01 NG/ML (ref 0–0.03)
WBC NRBC COR # BLD: 9.3 10*3/MM3 (ref 3.4–10.8)
WHOLE BLOOD HOLD SPECIMEN: NORMAL
WHOLE BLOOD HOLD SPECIMEN: NORMAL

## 2019-10-16 PROCEDURE — 87040 BLOOD CULTURE FOR BACTERIA: CPT | Performed by: NURSE PRACTITIONER

## 2019-10-16 PROCEDURE — 80053 COMPREHEN METABOLIC PANEL: CPT | Performed by: NURSE PRACTITIONER

## 2019-10-16 PROCEDURE — 85610 PROTHROMBIN TIME: CPT | Performed by: NURSE PRACTITIONER

## 2019-10-16 PROCEDURE — 83605 ASSAY OF LACTIC ACID: CPT

## 2019-10-16 PROCEDURE — 93005 ELECTROCARDIOGRAM TRACING: CPT | Performed by: NURSE PRACTITIONER

## 2019-10-16 PROCEDURE — 99284 EMERGENCY DEPT VISIT MOD MDM: CPT

## 2019-10-16 PROCEDURE — 84484 ASSAY OF TROPONIN QUANT: CPT | Performed by: NURSE PRACTITIONER

## 2019-10-16 PROCEDURE — 85025 COMPLETE CBC W/AUTO DIFF WBC: CPT | Performed by: NURSE PRACTITIONER

## 2019-10-16 PROCEDURE — 71046 X-RAY EXAM CHEST 2 VIEWS: CPT

## 2019-10-16 PROCEDURE — 0099U HC BIOFIRE FILMARRAY RESP PANEL 1: CPT | Performed by: NURSE PRACTITIONER

## 2019-10-16 PROCEDURE — 85379 FIBRIN DEGRADATION QUANT: CPT | Performed by: NURSE PRACTITIONER

## 2019-10-16 PROCEDURE — 83880 ASSAY OF NATRIURETIC PEPTIDE: CPT | Performed by: NURSE PRACTITIONER

## 2019-10-16 PROCEDURE — 81025 URINE PREGNANCY TEST: CPT | Performed by: NURSE PRACTITIONER

## 2019-10-16 RX ORDER — SODIUM CHLORIDE 0.9 % (FLUSH) 0.9 %
10 SYRINGE (ML) INJECTION AS NEEDED
Status: DISCONTINUED | OUTPATIENT
Start: 2019-10-16 | End: 2019-10-17 | Stop reason: HOSPADM

## 2019-10-16 RX ORDER — AZITHROMYCIN 250 MG/1
TABLET, FILM COATED ORAL
Qty: 6 TABLET | Refills: 0 | Status: SHIPPED | OUTPATIENT
Start: 2019-10-16 | End: 2021-03-11

## 2019-10-16 RX ORDER — TRAMADOL HYDROCHLORIDE 50 MG/1
50 TABLET ORAL EVERY 6 HOURS PRN
COMMUNITY
End: 2021-03-11

## 2019-10-16 NOTE — ED NOTES
Pt c/o cough, chest and back pain with deep breaths worsening x1 wk with intermittent numbness in bilat arms x2 days; states Hx PE.     Pavithra Garza, RN  10/16/19 1903

## 2019-10-17 NOTE — ED PROVIDER NOTES
Subjective   Patient is a 44-year-old female with cough congestion generalized achiness and weakness for the past several days.  Denies earache sore throat fever vomiting diarrhea or other associated complaints.            Review of Systems  For headache earache sore throat fever chest pain vomiting diarrhea dysuria weakness or weight loss.  Past Medical History:   Diagnosis Date   • Diabetes mellitus (CMS/HCC)    • Leukemia (CMS/HCC) 2019   • Pulmonary embolism (CMS/HCC)        Allergies   Allergen Reactions   • Hydromorphone GI Intolerance   • Morphine Nausea And Vomiting   • Propofol Hives       Past Surgical History:   Procedure Laterality Date   • BONE MARROW BIOPSY     • BREAST SURGERY     • CHOLECYSTECTOMY     • TUBAL ABDOMINAL LIGATION         History reviewed. No pertinent family history.    Social History     Socioeconomic History   • Marital status:      Spouse name: Not on file   • Number of children: Not on file   • Years of education: Not on file   • Highest education level: Not on file   Tobacco Use   • Smoking status: Never Smoker   • Smokeless tobacco: Never Used   Substance and Sexual Activity   • Alcohol use: No     Frequency: Never   • Drug use: No   • Sexual activity: Defer           Objective   Physical Exam  HEENT exam shows TMs to be clear.  Oropharynx, spit sclerae nonicteric.  Neck has no adenopathy JVD or bruits.  Lungs are clear.  Heart has regular rate rhythm without murmur rub or gallop.  Chest is nontender.  Abdomen is soft nontender.  Extremity exam is no cyanosis or edema.  Procedures     My EKG interpretation shows normal sinus rhythm with no acute ST change      ED Course      Results for orders placed or performed during the hospital encounter of 10/16/19   Comprehensive Metabolic Panel   Result Value Ref Range    Glucose 321 (H) 65 - 99 mg/dL    BUN 8 6 - 20 mg/dL    Creatinine 0.52 (L) 0.57 - 1.00 mg/dL    Sodium 133 (L) 136 - 145 mmol/L    Potassium 3.7 3.5 - 5.2 mmol/L     Chloride 100 98 - 107 mmol/L    CO2 20.0 (L) 22.0 - 29.0 mmol/L    Calcium 8.9 8.6 - 10.5 mg/dL    Total Protein 7.0 6.0 - 8.5 g/dL    Albumin 3.90 3.50 - 5.20 g/dL    ALT (SGPT) 15 1 - 33 U/L    AST (SGOT) 10 1 - 32 U/L    Alkaline Phosphatase 85 39 - 117 U/L    Total Bilirubin 0.4 0.2 - 1.2 mg/dL    eGFR Non African Amer 128 >60 mL/min/1.73    Globulin 3.1 gm/dL    A/G Ratio 1.3 g/dL    BUN/Creatinine Ratio 15.4 7.0 - 25.0    Anion Gap 16.7 (H) 5.0 - 15.0 mmol/L   BNP   Result Value Ref Range    proBNP 25.4 5.0 - 450.0 pg/mL   Troponin   Result Value Ref Range    Troponin T <0.010 0.000 - 0.030 ng/mL   Pregnancy, Urine - Urine, Clean Catch   Result Value Ref Range    HCG, Urine QL Negative Negative   CBC Auto Differential   Result Value Ref Range    WBC 9.30 3.40 - 10.80 10*3/mm3    RBC 4.46 3.77 - 5.28 10*6/mm3    Hemoglobin 12.3 12.0 - 15.9 g/dL    Hematocrit 35.5 34.0 - 46.6 %    MCV 79.6 79.0 - 97.0 fL    MCH 27.5 26.6 - 33.0 pg    MCHC 34.6 31.5 - 35.7 g/dL    RDW 14.4 12.3 - 15.4 %    RDW-SD 40.3 37.0 - 54.0 fl    MPV 9.0 6.0 - 12.0 fL    Platelets 381 140 - 450 10*3/mm3    Neutrophil % 60.0 42.7 - 76.0 %    Lymphocyte % 32.1 19.6 - 45.3 %    Monocyte % 5.8 5.0 - 12.0 %    Eosinophil % 0.8 0.3 - 6.2 %    Basophil % 1.3 0.0 - 1.5 %    Neutrophils, Absolute 5.60 1.70 - 7.00 10*3/mm3    Lymphocytes, Absolute 3.00 0.70 - 3.10 10*3/mm3    Monocytes, Absolute 0.50 0.10 - 0.90 10*3/mm3    Eosinophils, Absolute 0.10 0.00 - 0.40 10*3/mm3    Basophils, Absolute 0.10 0.00 - 0.20 10*3/mm3    nRBC 0.2 0.0 - 0.2 /100 WBC   Protime-INR   Result Value Ref Range    Protime 10.3 9.6 - 11.7 Seconds    INR 0.98 0.90 - 1.10   D-dimer, Quantitative   Result Value Ref Range    D-Dimer, Quantitative 0.42 0.17 - 0.59 MCGFEU/mL   POC Lactate   Result Value Ref Range    Lactate 0.5 0.5 - 2.0 mmol/L   Light Blue Top   Result Value Ref Range    Extra Tube hold for add-on    Green Top (Gel)   Result Value Ref Range    Extra Tube Hold  for add-ons.    Lavender Top   Result Value Ref Range    Extra Tube hold for add-on    Gold Top - SST   Result Value Ref Range    Extra Tube Hold for add-ons.      Xr Chest 2 View    Result Date: 10/16/2019  Impression:  1. No evidence of active disease.  Electronically Signed By-Evelyn Sherwood On:10/16/2019 7:35 PM This report was finalized on [id number] by  Evelyn Sherwood, .                MDM  Number of Diagnoses or Management Options  Diagnosis management comments: Patient is a benign physical exam.  She has no evidence of acute infectious process at this time.  She most likely has bronchitis versus a viral syndrome.  There is no metabolic abnormalities.  Patient will be discharged and will be placed on Zithromax.  She will see MD for recheck.       Amount and/or Complexity of Data Reviewed  Tests in the radiology section of CPT®: reviewed    Risk of Complications, Morbidity, and/or Mortality  Presenting problems: moderate  Diagnostic procedures: moderate  Management options: moderate    Patient Progress  Patient progress: stable      Final diagnoses:   Dyspnea, unspecified type   Acute bronchitis, unspecified organism              Chandler Ibarra MD  10/16/19 2239       Chandler Ibarra MD  10/16/19 2240       Chandler Ibarra MD  10/16/19 2242

## 2019-10-21 LAB
BACTERIA SPEC AEROBE CULT: NORMAL
BACTERIA SPEC AEROBE CULT: NORMAL

## 2019-11-01 ENCOUNTER — TELEPHONE (OUTPATIENT)
Dept: ONCOLOGY | Facility: CLINIC | Age: 44
End: 2019-11-01

## 2020-10-26 ENCOUNTER — HOSPITAL ENCOUNTER (EMERGENCY)
Facility: HOSPITAL | Age: 45
Discharge: HOME | End: 2020-10-26
Attending: EMERGENCY MEDICINE | Admitting: EMERGENCY MEDICINE

## 2020-10-26 ENCOUNTER — APPOINTMENT (OUTPATIENT)
Dept: GENERAL RADIOLOGY | Facility: HOSPITAL | Age: 45
End: 2020-10-26

## 2020-10-26 ENCOUNTER — APPOINTMENT (OUTPATIENT)
Dept: CT IMAGING | Facility: HOSPITAL | Age: 45
End: 2020-10-26

## 2020-10-26 VITALS
HEIGHT: 64 IN | WEIGHT: 150.57 LBS | BODY MASS INDEX: 25.71 KG/M2 | HEART RATE: 107 BPM | SYSTOLIC BLOOD PRESSURE: 109 MMHG | TEMPERATURE: 98 F | DIASTOLIC BLOOD PRESSURE: 64 MMHG | RESPIRATION RATE: 18 BRPM | OXYGEN SATURATION: 100 %

## 2020-10-26 DIAGNOSIS — C95.90 LEUKEMIA NOT HAVING ACHIEVED REMISSION, UNSPECIFIED LEUKEMIA TYPE (HCC): ICD-10-CM

## 2020-10-26 DIAGNOSIS — J40 BRONCHITIS: Primary | ICD-10-CM

## 2020-10-26 LAB
ALBUMIN SERPL-MCNC: 4.2 G/DL (ref 3.5–5.2)
ALBUMIN/GLOB SERPL: 1.6 G/DL
ALP SERPL-CCNC: 78 U/L (ref 39–117)
ALT SERPL W P-5'-P-CCNC: 13 U/L (ref 1–33)
ANION GAP SERPL CALCULATED.3IONS-SCNC: 14 MMOL/L (ref 5–15)
ANISOCYTOSIS BLD QL: ABNORMAL
AST SERPL-CCNC: 16 U/L (ref 1–32)
BILIRUB SERPL-MCNC: 0.8 MG/DL (ref 0–1.2)
BUN SERPL-MCNC: 9 MG/DL (ref 6–20)
BUN/CREAT SERPL: 15 (ref 7–25)
CALCIUM SPEC-SCNC: 9.7 MG/DL (ref 8.6–10.5)
CHLORIDE SERPL-SCNC: 98 MMOL/L (ref 98–107)
CO2 SERPL-SCNC: 20 MMOL/L (ref 22–29)
CREAT SERPL-MCNC: 0.6 MG/DL (ref 0.57–1)
DACRYOCYTES BLD QL SMEAR: ABNORMAL
DEPRECATED RDW RBC AUTO: 49 FL (ref 37–54)
ERYTHROCYTE [DISTWIDTH] IN BLOOD BY AUTOMATED COUNT: 17.4 % (ref 12.3–15.4)
GFR SERPL CREATININE-BSD FRML MDRD: 108 ML/MIN/1.73
GIANT PLATELETS: ABNORMAL
GLOBULIN UR ELPH-MCNC: 2.7 GM/DL
GLUCOSE SERPL-MCNC: 343 MG/DL (ref 65–99)
HCT VFR BLD AUTO: 37.1 % (ref 34–46.6)
HGB BLD-MCNC: 12.1 G/DL (ref 12–15.9)
LARGE PLATELETS: ABNORMAL
LYMPHOCYTES # BLD MANUAL: 7.01 10*3/MM3 (ref 0.7–3.1)
LYMPHOCYTES NFR BLD MANUAL: 16 % (ref 19.6–45.3)
LYMPHOCYTES NFR BLD MANUAL: 3 % (ref 5–12)
MCH RBC QN AUTO: 26.4 PG (ref 26.6–33)
MCHC RBC AUTO-ENTMCNC: 32.7 G/DL (ref 31.5–35.7)
MCV RBC AUTO: 80.8 FL (ref 79–97)
METAMYELOCYTES NFR BLD MANUAL: 8 % (ref 0–0)
MICROCYTES BLD QL: ABNORMAL
MONOCYTES # BLD AUTO: 1.31 10*3/MM3 (ref 0.1–0.9)
MYELOCYTES NFR BLD MANUAL: 2 % (ref 0–0)
NEUTROPHILS # BLD AUTO: 30.66 10*3/MM3 (ref 1.7–7)
NEUTROPHILS NFR BLD MANUAL: 49 % (ref 42.7–76)
NEUTS BAND NFR BLD MANUAL: 21 % (ref 0–5)
PLATELET # BLD AUTO: 610 10*3/MM3 (ref 140–450)
PMV BLD AUTO: 9.3 FL (ref 6–12)
POIKILOCYTOSIS BLD QL SMEAR: ABNORMAL
POTASSIUM SERPL-SCNC: 4.1 MMOL/L (ref 3.5–5.2)
PROT SERPL-MCNC: 6.9 G/DL (ref 6–8.5)
RBC # BLD AUTO: 4.59 10*6/MM3 (ref 3.77–5.28)
SARS-COV-2 RNA PNL SPEC NAA+PROBE: NOT DETECTED
SCAN SLIDE: NORMAL
SMALL PLATELETS BLD QL SMEAR: ABNORMAL
SODIUM SERPL-SCNC: 132 MMOL/L (ref 136–145)
VARIANT LYMPHS NFR BLD MANUAL: 1 % (ref 0–5)
WBC # BLD AUTO: 43.8 10*3/MM3 (ref 3.4–10.8)
WBC MORPH BLD: NORMAL

## 2020-10-26 PROCEDURE — 99283 EMERGENCY DEPT VISIT LOW MDM: CPT

## 2020-10-26 PROCEDURE — 85025 COMPLETE CBC W/AUTO DIFF WBC: CPT | Performed by: EMERGENCY MEDICINE

## 2020-10-26 PROCEDURE — 71250 CT THORAX DX C-: CPT

## 2020-10-26 PROCEDURE — 87635 SARS-COV-2 COVID-19 AMP PRB: CPT | Performed by: EMERGENCY MEDICINE

## 2020-10-26 PROCEDURE — 80053 COMPREHEN METABOLIC PANEL: CPT | Performed by: EMERGENCY MEDICINE

## 2020-10-26 PROCEDURE — 85007 BL SMEAR W/DIFF WBC COUNT: CPT | Performed by: EMERGENCY MEDICINE

## 2020-10-26 PROCEDURE — 93005 ELECTROCARDIOGRAM TRACING: CPT | Performed by: EMERGENCY MEDICINE

## 2020-10-26 PROCEDURE — 71045 X-RAY EXAM CHEST 1 VIEW: CPT

## 2020-10-26 PROCEDURE — 87040 BLOOD CULTURE FOR BACTERIA: CPT | Performed by: EMERGENCY MEDICINE

## 2020-10-26 RX ORDER — CEFDINIR 300 MG/1
300 CAPSULE ORAL 2 TIMES DAILY
Qty: 14 CAPSULE | Refills: 0 | Status: SHIPPED | OUTPATIENT
Start: 2020-10-26 | End: 2020-11-02

## 2020-10-26 RX ORDER — SODIUM CHLORIDE 0.9 % (FLUSH) 0.9 %
10 SYRINGE (ML) INJECTION AS NEEDED
Status: DISCONTINUED | OUTPATIENT
Start: 2020-10-26 | End: 2020-10-27 | Stop reason: HOSPADM

## 2020-10-26 RX ADMIN — SODIUM CHLORIDE 500 ML: 9 INJECTION, SOLUTION INTRAVENOUS at 21:11

## 2020-10-31 LAB — BACTERIA SPEC AEROBE CULT: NORMAL

## 2020-11-03 LAB — BACTERIA SPEC AEROBE CULT: NORMAL

## 2021-02-22 ENCOUNTER — APPOINTMENT (OUTPATIENT)
Dept: GENERAL RADIOLOGY | Facility: HOSPITAL | Age: 46
End: 2021-02-22

## 2021-02-22 ENCOUNTER — HOSPITAL ENCOUNTER (EMERGENCY)
Facility: HOSPITAL | Age: 46
Discharge: HOME | End: 2021-02-22
Attending: EMERGENCY MEDICINE | Admitting: EMERGENCY MEDICINE

## 2021-02-22 VITALS
DIASTOLIC BLOOD PRESSURE: 72 MMHG | RESPIRATION RATE: 16 BRPM | TEMPERATURE: 98 F | SYSTOLIC BLOOD PRESSURE: 138 MMHG | HEART RATE: 83 BPM | OXYGEN SATURATION: 99 % | WEIGHT: 136.02 LBS | BODY MASS INDEX: 23.22 KG/M2 | HEIGHT: 64 IN

## 2021-02-22 DIAGNOSIS — S40.012A CONTUSION OF LEFT SHOULDER, INITIAL ENCOUNTER: Primary | ICD-10-CM
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
## 2021-02-22 PROCEDURE — 99282 EMERGENCY DEPT VISIT SF MDM: CPT

## 2021-02-22 PROCEDURE — 73030 X-RAY EXAM OF SHOULDER: CPT

## 2021-02-22 RX ORDER — NAPROXEN 375 MG/1
375 TABLET ORAL 2 TIMES DAILY PRN
Qty: 14 TABLET | Refills: 0 | Status: SHIPPED | OUTPATIENT
Start: 2021-02-22 | End: 2021-03-11

## 2021-02-22 NOTE — ED NOTES
Pt states she slipped yesterday evening at her apartment and tried to get through the pain throughout the night but this morning she got up and was in extreme pain.     Call light within reach. Will continue to monitor     Kaylan Meneses LPN  02/22/21 0602

## 2021-02-22 NOTE — ED PROVIDER NOTES
Subjective   Patient is a 45-year-old female who slipped and fell.  Complains of pain to her left shoulder.  Denies other associated complaints          Review of Systems  Negative for numbness tingling or other complaint of injury  Past Medical History:   Diagnosis Date   • Diabetes mellitus (CMS/HCC)    • Leukemia (CMS/HCC) 2019   • Pulmonary embolism (CMS/HCC)        Allergies   Allergen Reactions   • Hydromorphone GI Intolerance   • Morphine Nausea And Vomiting   • Propofol Hives       Past Surgical History:   Procedure Laterality Date   • BONE MARROW BIOPSY     • BREAST SURGERY     • CHOLECYSTECTOMY     • TUBAL ABDOMINAL LIGATION         No family history on file.    Social History     Socioeconomic History   • Marital status:      Spouse name: Not on file   • Number of children: Not on file   • Years of education: Not on file   • Highest education level: Not on file   Tobacco Use   • Smoking status: Never Smoker   • Smokeless tobacco: Never Used   Substance and Sexual Activity   • Alcohol use: No     Frequency: Never   • Drug use: No   • Sexual activity: Defer           Objective   Physical Exam   Extremity exam shows pain to palpation of the left shoulder.  Patient has decreased range of motion due to pain.  She has 2+ distal pulses and is neurovascular intact.    Procedures           ED Course                                           MDM  Number of Diagnoses or Management Options  Diagnosis management comments: Patient's x-ray shows no acute injury.  Patient will be discharged with diagnosis of left shoulder contusion patient will be placed in a sling and given Naprosyn.  She will see MD for recheck as needed.    Risk of Complications, Morbidity, and/or Mortality  Presenting problems: moderate  Diagnostic procedures: moderate  Management options: moderate    Patient Progress  Patient progress: stable      Final diagnoses:   Contusion of left shoulder, initial encounter   Fall, initial encounter             Chandler Ibarra MD  02/22/21 0711

## 2021-03-11 ENCOUNTER — APPOINTMENT (OUTPATIENT)
Dept: GENERAL RADIOLOGY | Facility: HOSPITAL | Age: 46
End: 2021-03-11

## 2021-03-11 ENCOUNTER — HOSPITAL ENCOUNTER (OUTPATIENT)
Facility: HOSPITAL | Age: 46
Setting detail: OBSERVATION
Discharge: HOME | End: 2021-03-12
Attending: EMERGENCY MEDICINE | Admitting: INTERNAL MEDICINE

## 2021-03-11 ENCOUNTER — APPOINTMENT (OUTPATIENT)
Dept: CARDIOLOGY | Facility: HOSPITAL | Age: 46
End: 2021-03-11

## 2021-03-11 DIAGNOSIS — M79.605 LEFT LEG PAIN: ICD-10-CM

## 2021-03-11 DIAGNOSIS — C95.90 LEUKEMIA NOT HAVING ACHIEVED REMISSION, UNSPECIFIED LEUKEMIA TYPE (HCC): Primary | ICD-10-CM

## 2021-03-11 DIAGNOSIS — M25.561 ACUTE PAIN OF RIGHT KNEE: ICD-10-CM

## 2021-03-11 DIAGNOSIS — E11.65 UNCONTROLLED TYPE 2 DIABETES MELLITUS WITH HYPERGLYCEMIA (HCC): ICD-10-CM

## 2021-03-11 PROBLEM — F32.A DEPRESSION: Status: ACTIVE | Noted: 2021-03-11

## 2021-03-11 PROBLEM — Z86.711 HISTORY OF PULMONARY EMBOLISM: Chronic | Status: ACTIVE | Noted: 2021-03-11

## 2021-03-11 PROBLEM — D64.9 ANEMIA: Status: ACTIVE | Noted: 2021-03-11

## 2021-03-11 PROBLEM — H53.9 VISUAL CHANGES: Status: ACTIVE | Noted: 2021-03-11

## 2021-03-11 PROBLEM — C92.10 CML (CHRONIC MYELOCYTIC LEUKEMIA): Status: ACTIVE | Noted: 2018-04-01

## 2021-03-11 PROBLEM — Z91.199 MEDICALLY NONCOMPLIANT: Chronic | Status: ACTIVE | Noted: 2021-03-11

## 2021-03-11 PROBLEM — E11.9 TYPE 2 DIABETES MELLITUS WITHOUT COMPLICATION, WITHOUT LONG-TERM CURRENT USE OF INSULIN: Status: ACTIVE | Noted: 2018-04-01

## 2021-03-11 PROBLEM — F32.A DEPRESSION: Chronic | Status: ACTIVE | Noted: 2021-03-11

## 2021-03-11 PROBLEM — E11.9 TYPE 2 DIABETES MELLITUS WITHOUT COMPLICATION, WITHOUT LONG-TERM CURRENT USE OF INSULIN: Chronic | Status: ACTIVE | Noted: 2018-04-01

## 2021-03-11 LAB
ALBUMIN SERPL-MCNC: 3.7 G/DL (ref 3.5–5.2)
ALBUMIN/GLOB SERPL: 1.3 G/DL
ALP SERPL-CCNC: 130 U/L (ref 39–117)
ALT SERPL W P-5'-P-CCNC: 11 U/L (ref 1–33)
ANION GAP SERPL CALCULATED.3IONS-SCNC: 11 MMOL/L (ref 5–15)
ANISOCYTOSIS BLD QL: ABNORMAL
AST SERPL-CCNC: 17 U/L (ref 1–32)
BASOPHILS # BLD MANUAL: 0.89 10*3/MM3 (ref 0–0.2)
BASOPHILS NFR BLD AUTO: 1 % (ref 0–1.5)
BH CV LOWER VASCULAR LEFT COMMON FEMORAL AUGMENT: NORMAL
BH CV LOWER VASCULAR LEFT COMMON FEMORAL COMPETENT: NORMAL
BH CV LOWER VASCULAR LEFT COMMON FEMORAL COMPRESS: NORMAL
BH CV LOWER VASCULAR LEFT COMMON FEMORAL PHASIC: NORMAL
BH CV LOWER VASCULAR LEFT COMMON FEMORAL SPONT: NORMAL
BH CV LOWER VASCULAR LEFT DISTAL FEMORAL COMPRESS: NORMAL
BH CV LOWER VASCULAR LEFT GASTRONEMIUS COMPRESS: NORMAL
BH CV LOWER VASCULAR LEFT GREATER SAPH AK COMPRESS: NORMAL
BH CV LOWER VASCULAR LEFT GREATER SAPH BK COMPRESS: NORMAL
BH CV LOWER VASCULAR LEFT LESSER SAPH COMPRESS: NORMAL
BH CV LOWER VASCULAR LEFT MID FEMORAL AUGMENT: NORMAL
BH CV LOWER VASCULAR LEFT MID FEMORAL COMPETENT: NORMAL
BH CV LOWER VASCULAR LEFT MID FEMORAL COMPRESS: NORMAL
BH CV LOWER VASCULAR LEFT MID FEMORAL PHASIC: NORMAL
BH CV LOWER VASCULAR LEFT MID FEMORAL SPONT: NORMAL
BH CV LOWER VASCULAR LEFT PERONEAL COMPRESS: NORMAL
BH CV LOWER VASCULAR LEFT POPLITEAL AUGMENT: NORMAL
BH CV LOWER VASCULAR LEFT POPLITEAL COMPETENT: NORMAL
BH CV LOWER VASCULAR LEFT POPLITEAL COMPRESS: NORMAL
BH CV LOWER VASCULAR LEFT POPLITEAL PHASIC: NORMAL
BH CV LOWER VASCULAR LEFT POPLITEAL SPONT: NORMAL
BH CV LOWER VASCULAR LEFT POSTERIOR TIBIAL COMPRESS: NORMAL
BH CV LOWER VASCULAR LEFT PROXIMAL FEMORAL COMPRESS: NORMAL
BH CV LOWER VASCULAR LEFT SAPHENOFEMORAL JUNCTION COMPRESS: NORMAL
BH CV LOWER VASCULAR RIGHT COMMON FEMORAL AUGMENT: NORMAL
BH CV LOWER VASCULAR RIGHT COMMON FEMORAL COMPETENT: NORMAL
BH CV LOWER VASCULAR RIGHT COMMON FEMORAL COMPRESS: NORMAL
BH CV LOWER VASCULAR RIGHT COMMON FEMORAL PHASIC: NORMAL
BH CV LOWER VASCULAR RIGHT COMMON FEMORAL SPONT: NORMAL
BILIRUB SERPL-MCNC: 0.9 MG/DL (ref 0–1.2)
BLASTS NFR BLD MANUAL: 7 % (ref 0–0)
BUN SERPL-MCNC: 5 MG/DL (ref 6–20)
BUN/CREAT SERPL: 8.5 (ref 7–25)
CALCIUM SPEC-SCNC: 9.3 MG/DL (ref 8.6–10.5)
CHLORIDE SERPL-SCNC: 100 MMOL/L (ref 98–107)
CO2 SERPL-SCNC: 24 MMOL/L (ref 22–29)
CREAT SERPL-MCNC: 0.59 MG/DL (ref 0.57–1)
DEPRECATED RDW RBC AUTO: 52.1 FL (ref 37–54)
EOSINOPHIL # BLD MANUAL: 5.31 10*3/MM3 (ref 0–0.4)
EOSINOPHIL NFR BLD MANUAL: 6 % (ref 0.3–6.2)
ERYTHROCYTE [DISTWIDTH] IN BLOOD BY AUTOMATED COUNT: 17.8 % (ref 12.3–15.4)
FERRITIN SERPL-MCNC: 58.23 NG/ML (ref 13–150)
FOLATE SERPL-MCNC: 5.32 NG/ML (ref 4.78–24.2)
GFR SERPL CREATININE-BSD FRML MDRD: 110 ML/MIN/1.73
GLOBULIN UR ELPH-MCNC: 2.8 GM/DL
GLUCOSE BLDC GLUCOMTR-MCNC: 153 MG/DL (ref 70–105)
GLUCOSE BLDC GLUCOMTR-MCNC: 178 MG/DL (ref 70–105)
GLUCOSE BLDC GLUCOMTR-MCNC: 219 MG/DL (ref 70–105)
GLUCOSE BLDC GLUCOMTR-MCNC: 247 MG/DL (ref 70–105)
GLUCOSE SERPL-MCNC: 371 MG/DL (ref 65–99)
HAPTOGLOB SERPL-MCNC: 75 MG/DL (ref 30–200)
HBA1C MFR BLD: 11.4 % (ref 3.5–5.6)
HCT VFR BLD AUTO: 28.8 % (ref 34–46.6)
HGB BLD-MCNC: 8.9 G/DL (ref 12–15.9)
HOLD SPECIMEN: NORMAL
IRON 24H UR-MRATE: 43 MCG/DL (ref 37–145)
IRON 24H UR-MRATE: 56 MCG/DL (ref 37–145)
IRON SATN MFR SERPL: 11 % (ref 20–50)
LAB AP CASE REPORT: NORMAL
LDH SERPL-CCNC: 1187 U/L (ref 135–214)
LYMPHOCYTES # BLD MANUAL: 6.2 10*3/MM3 (ref 0.7–3.1)
LYMPHOCYTES NFR BLD MANUAL: 2 % (ref 5–12)
LYMPHOCYTES NFR BLD MANUAL: 7 % (ref 19.6–45.3)
MCH RBC QN AUTO: 26.2 PG (ref 26.6–33)
MCHC RBC AUTO-ENTMCNC: 31 G/DL (ref 31.5–35.7)
MCV RBC AUTO: 84.4 FL (ref 79–97)
METAMYELOCYTES NFR BLD MANUAL: 8 % (ref 0–0)
MONOCYTES # BLD AUTO: 1.77 10*3/MM3 (ref 0.1–0.9)
MYELOCYTES NFR BLD MANUAL: 4 % (ref 0–0)
NEUTROPHILS # BLD AUTO: 54.87 10*3/MM3 (ref 1.7–7)
NEUTROPHILS NFR BLD MANUAL: 52 % (ref 42.7–76)
NEUTS BAND NFR BLD MANUAL: 10 % (ref 0–5)
NRBC SPEC MANUAL: 13 /100 WBC (ref 0–0.2)
PATH REPORT.FINAL DX SPEC: NORMAL
PATHOLOGY REVIEW: YES
PLAT MORPH BLD: NORMAL
PLATELET # BLD AUTO: 273 10*3/MM3 (ref 140–450)
PMV BLD AUTO: 8.2 FL (ref 6–12)
POTASSIUM SERPL-SCNC: 3.7 MMOL/L (ref 3.5–5.2)
PROLYMPHOCYTES NFR BLD MANUAL: 3 % (ref 0–0)
PROT SERPL-MCNC: 6.5 G/DL (ref 6–8.5)
RBC # BLD AUTO: 3.42 10*6/MM3 (ref 3.77–5.28)
RETICS # AUTO: 0.2 10*6/MM3 (ref 0.02–0.13)
RETICS/RBC NFR AUTO: 6.42 % (ref 0.7–1.9)
SARS-COV-2 ORF1AB RESP QL NAA+PROBE: NOT DETECTED
SCAN SLIDE: NORMAL
SODIUM SERPL-SCNC: 135 MMOL/L (ref 136–145)
TIBC SERPL-MCNC: 404 MCG/DL (ref 298–536)
TRANSFERRIN SERPL-MCNC: 271 MG/DL (ref 200–360)
URATE SERPL-MCNC: 5.4 MG/DL (ref 2.4–5.7)
VIT B12 BLD-MCNC: >2000 PG/ML (ref 211–946)
WBC # BLD AUTO: 88.5 10*3/MM3 (ref 3.4–10.8)
WBC MORPH BLD: NORMAL
WHOLE BLOOD HOLD SPECIMEN: NORMAL

## 2021-03-11 PROCEDURE — G0378 HOSPITAL OBSERVATION PER HR: HCPCS

## 2021-03-11 PROCEDURE — 25010000002 KETOROLAC TROMETHAMINE PER 15 MG: Performed by: NURSE PRACTITIONER

## 2021-03-11 PROCEDURE — 82962 GLUCOSE BLOOD TEST: CPT

## 2021-03-11 PROCEDURE — 81206 BCR/ABL1 GENE MAJOR BP: CPT

## 2021-03-11 PROCEDURE — 93971 EXTREMITY STUDY: CPT

## 2021-03-11 PROCEDURE — 80053 COMPREHEN METABOLIC PANEL: CPT | Performed by: EMERGENCY MEDICINE

## 2021-03-11 PROCEDURE — 83615 LACTATE (LD) (LDH) ENZYME: CPT | Performed by: NURSE PRACTITIONER

## 2021-03-11 PROCEDURE — U0004 COV-19 TEST NON-CDC HGH THRU: HCPCS | Performed by: EMERGENCY MEDICINE

## 2021-03-11 PROCEDURE — 83036 HEMOGLOBIN GLYCOSYLATED A1C: CPT | Performed by: EMERGENCY MEDICINE

## 2021-03-11 PROCEDURE — 85007 BL SMEAR W/DIFF WBC COUNT: CPT | Performed by: EMERGENCY MEDICINE

## 2021-03-11 PROCEDURE — G0108 DIAB MANAGE TRN  PER INDIV: HCPCS

## 2021-03-11 PROCEDURE — 84550 ASSAY OF BLOOD/URIC ACID: CPT | Performed by: NURSE PRACTITIONER

## 2021-03-11 PROCEDURE — 63710000001 INSULIN LISPRO (HUMAN) PER 5 UNITS: Performed by: NURSE PRACTITIONER

## 2021-03-11 PROCEDURE — 81207 BCR/ABL1 GENE MINOR BP: CPT

## 2021-03-11 PROCEDURE — 83010 ASSAY OF HAPTOGLOBIN QUANT: CPT | Performed by: NURSE PRACTITIONER

## 2021-03-11 PROCEDURE — 99284 EMERGENCY DEPT VISIT MOD MDM: CPT

## 2021-03-11 PROCEDURE — 85045 AUTOMATED RETICULOCYTE COUNT: CPT | Performed by: NURSE PRACTITIONER

## 2021-03-11 PROCEDURE — 83540 ASSAY OF IRON: CPT | Performed by: NURSE PRACTITIONER

## 2021-03-11 PROCEDURE — 82746 ASSAY OF FOLIC ACID SERUM: CPT | Performed by: NURSE PRACTITIONER

## 2021-03-11 PROCEDURE — 73560 X-RAY EXAM OF KNEE 1 OR 2: CPT

## 2021-03-11 PROCEDURE — 85025 COMPLETE CBC W/AUTO DIFF WBC: CPT | Performed by: EMERGENCY MEDICINE

## 2021-03-11 PROCEDURE — 63710000001 INSULIN GLARGINE PER 5 UNITS: Performed by: NURSE PRACTITIONER

## 2021-03-11 PROCEDURE — 96375 TX/PRO/DX INJ NEW DRUG ADDON: CPT

## 2021-03-11 PROCEDURE — 84466 ASSAY OF TRANSFERRIN: CPT | Performed by: NURSE PRACTITIONER

## 2021-03-11 PROCEDURE — 99219 PR INITIAL OBSERVATION CARE/DAY 50 MINUTES: CPT | Performed by: INTERNAL MEDICINE

## 2021-03-11 PROCEDURE — 63710000001 INSULIN LISPRO (HUMAN) PER 5 UNITS: Performed by: EMERGENCY MEDICINE

## 2021-03-11 PROCEDURE — 99214 OFFICE O/P EST MOD 30 MIN: CPT | Performed by: INTERNAL MEDICINE

## 2021-03-11 PROCEDURE — C9803 HOPD COVID-19 SPEC COLLECT: HCPCS

## 2021-03-11 PROCEDURE — 99220 PR INITIAL OBSERVATION CARE/DAY 70 MINUTES: CPT | Performed by: NURSE PRACTITIONER

## 2021-03-11 PROCEDURE — 82607 VITAMIN B-12: CPT | Performed by: NURSE PRACTITIONER

## 2021-03-11 PROCEDURE — 82728 ASSAY OF FERRITIN: CPT | Performed by: NURSE PRACTITIONER

## 2021-03-11 PROCEDURE — U0005 INFEC AGEN DETEC AMPLI PROBE: HCPCS | Performed by: EMERGENCY MEDICINE

## 2021-03-11 PROCEDURE — 25010000002 KETOROLAC TROMETHAMINE PER 15 MG: Performed by: EMERGENCY MEDICINE

## 2021-03-11 PROCEDURE — 96376 TX/PRO/DX INJ SAME DRUG ADON: CPT

## 2021-03-11 RX ORDER — NICOTINE POLACRILEX 4 MG
15 LOZENGE BUCCAL
Status: DISCONTINUED | OUTPATIENT
Start: 2021-03-11 | End: 2021-03-12 | Stop reason: HOSPADM

## 2021-03-11 RX ORDER — INSULIN GLARGINE 100 [IU]/ML
20 INJECTION, SOLUTION SUBCUTANEOUS NIGHTLY
Status: DISCONTINUED | OUTPATIENT
Start: 2021-03-11 | End: 2021-03-12 | Stop reason: HOSPADM

## 2021-03-11 RX ORDER — ONDANSETRON 2 MG/ML
4 INJECTION INTRAMUSCULAR; INTRAVENOUS EVERY 6 HOURS PRN
Status: DISCONTINUED | OUTPATIENT
Start: 2021-03-11 | End: 2021-03-12 | Stop reason: HOSPADM

## 2021-03-11 RX ORDER — DEXTROSE MONOHYDRATE 25 G/50ML
25 INJECTION, SOLUTION INTRAVENOUS
Status: DISCONTINUED | OUTPATIENT
Start: 2021-03-11 | End: 2021-03-12 | Stop reason: HOSPADM

## 2021-03-11 RX ORDER — SODIUM CHLORIDE 0.9 % (FLUSH) 0.9 %
10 SYRINGE (ML) INJECTION AS NEEDED
Status: DISCONTINUED | OUTPATIENT
Start: 2021-03-11 | End: 2021-03-12 | Stop reason: HOSPADM

## 2021-03-11 RX ORDER — MAGNESIUM SULFATE 1 G/100ML
1 INJECTION INTRAVENOUS AS NEEDED
Status: DISCONTINUED | OUTPATIENT
Start: 2021-03-11 | End: 2021-03-12 | Stop reason: HOSPADM

## 2021-03-11 RX ORDER — KETOROLAC TROMETHAMINE 30 MG/ML
30 INJECTION, SOLUTION INTRAMUSCULAR; INTRAVENOUS EVERY 6 HOURS PRN
Status: DISCONTINUED | OUTPATIENT
Start: 2021-03-11 | End: 2021-03-12 | Stop reason: HOSPADM

## 2021-03-11 RX ORDER — SODIUM CHLORIDE 0.9 % (FLUSH) 0.9 %
10 SYRINGE (ML) INJECTION EVERY 12 HOURS SCHEDULED
Status: DISCONTINUED | OUTPATIENT
Start: 2021-03-11 | End: 2021-03-12 | Stop reason: HOSPADM

## 2021-03-11 RX ORDER — INSULIN LISPRO 100 [IU]/ML
6 INJECTION, SOLUTION INTRAVENOUS; SUBCUTANEOUS ONCE
Status: COMPLETED | OUTPATIENT
Start: 2021-03-11 | End: 2021-03-11

## 2021-03-11 RX ORDER — INSULIN LISPRO 100 [IU]/ML
0-9 INJECTION, SOLUTION INTRAVENOUS; SUBCUTANEOUS AS NEEDED
Status: DISCONTINUED | OUTPATIENT
Start: 2021-03-11 | End: 2021-03-12 | Stop reason: HOSPADM

## 2021-03-11 RX ORDER — ACETAMINOPHEN 325 MG/1
650 TABLET ORAL EVERY 4 HOURS PRN
Status: DISCONTINUED | OUTPATIENT
Start: 2021-03-11 | End: 2021-03-12 | Stop reason: HOSPADM

## 2021-03-11 RX ORDER — POTASSIUM CHLORIDE 20 MEQ/1
40 TABLET, EXTENDED RELEASE ORAL AS NEEDED
Status: DISCONTINUED | OUTPATIENT
Start: 2021-03-11 | End: 2021-03-12 | Stop reason: HOSPADM

## 2021-03-11 RX ORDER — MAGNESIUM SULFATE HEPTAHYDRATE 40 MG/ML
2 INJECTION, SOLUTION INTRAVENOUS AS NEEDED
Status: DISCONTINUED | OUTPATIENT
Start: 2021-03-11 | End: 2021-03-12 | Stop reason: HOSPADM

## 2021-03-11 RX ORDER — ALLOPURINOL 300 MG/1
300 TABLET ORAL NIGHTLY
Status: DISCONTINUED | OUTPATIENT
Start: 2021-03-11 | End: 2021-03-12 | Stop reason: HOSPADM

## 2021-03-11 RX ORDER — ACETAMINOPHEN 650 MG/1
650 SUPPOSITORY RECTAL EVERY 4 HOURS PRN
Status: DISCONTINUED | OUTPATIENT
Start: 2021-03-11 | End: 2021-03-12 | Stop reason: HOSPADM

## 2021-03-11 RX ORDER — ACETAMINOPHEN 160 MG/5ML
650 SOLUTION ORAL EVERY 4 HOURS PRN
Status: DISCONTINUED | OUTPATIENT
Start: 2021-03-11 | End: 2021-03-12 | Stop reason: HOSPADM

## 2021-03-11 RX ORDER — KETOROLAC TROMETHAMINE 15 MG/ML
15 INJECTION, SOLUTION INTRAMUSCULAR; INTRAVENOUS ONCE
Status: COMPLETED | OUTPATIENT
Start: 2021-03-11 | End: 2021-03-11

## 2021-03-11 RX ORDER — GABAPENTIN 100 MG/1
100 CAPSULE ORAL EVERY 8 HOURS SCHEDULED
Status: DISCONTINUED | OUTPATIENT
Start: 2021-03-11 | End: 2021-03-12 | Stop reason: HOSPADM

## 2021-03-11 RX ORDER — HYDROXYUREA 500 MG/1
1000 CAPSULE ORAL 2 TIMES DAILY
Status: DISCONTINUED | OUTPATIENT
Start: 2021-03-11 | End: 2021-03-12 | Stop reason: HOSPADM

## 2021-03-11 RX ORDER — INSULIN LISPRO 100 [IU]/ML
0-9 INJECTION, SOLUTION INTRAVENOUS; SUBCUTANEOUS
Status: DISCONTINUED | OUTPATIENT
Start: 2021-03-11 | End: 2021-03-12 | Stop reason: HOSPADM

## 2021-03-11 RX ORDER — ONDANSETRON 4 MG/1
4 TABLET, FILM COATED ORAL EVERY 6 HOURS PRN
Status: DISCONTINUED | OUTPATIENT
Start: 2021-03-11 | End: 2021-03-12 | Stop reason: HOSPADM

## 2021-03-11 RX ADMIN — HYDROXYUREA 1000 MG: 500 CAPSULE ORAL at 20:44

## 2021-03-11 RX ADMIN — INSULIN LISPRO 6 UNITS: 100 INJECTION, SOLUTION INTRAVENOUS; SUBCUTANEOUS at 07:31

## 2021-03-11 RX ADMIN — ALLOPURINOL 300 MG: 300 TABLET ORAL at 20:44

## 2021-03-11 RX ADMIN — Medication 10 ML: at 10:28

## 2021-03-11 RX ADMIN — INSULIN GLARGINE 20 UNITS: 100 INJECTION, SOLUTION SUBCUTANEOUS at 21:11

## 2021-03-11 RX ADMIN — Medication 10 ML: at 21:11

## 2021-03-11 RX ADMIN — GABAPENTIN 100 MG: 100 CAPSULE ORAL at 14:46

## 2021-03-11 RX ADMIN — KETOROLAC TROMETHAMINE 15 MG: 15 INJECTION, SOLUTION INTRAMUSCULAR; INTRAVENOUS at 05:38

## 2021-03-11 RX ADMIN — INSULIN LISPRO 4 UNITS: 100 INJECTION, SOLUTION INTRAVENOUS; SUBCUTANEOUS at 10:27

## 2021-03-11 RX ADMIN — KETAMINE HYDROCHLORIDE 20 MG: 50 INJECTION, SOLUTION INTRAMUSCULAR; INTRAVENOUS at 19:32

## 2021-03-11 RX ADMIN — GABAPENTIN 100 MG: 100 CAPSULE ORAL at 20:43

## 2021-03-11 RX ADMIN — KETOROLAC TROMETHAMINE 30 MG: 30 INJECTION, SOLUTION INTRAMUSCULAR; INTRAVENOUS at 23:48

## 2021-03-11 RX ADMIN — KETOROLAC TROMETHAMINE 30 MG: 30 INJECTION, SOLUTION INTRAMUSCULAR; INTRAVENOUS at 10:28

## 2021-03-11 RX ADMIN — INSULIN LISPRO 4 UNITS: 100 INJECTION, SOLUTION INTRAVENOUS; SUBCUTANEOUS at 16:58

## 2021-03-11 RX ADMIN — INSULIN GLARGINE 20 UNITS: 100 INJECTION, SOLUTION SUBCUTANEOUS at 10:24

## 2021-03-11 NOTE — CONSULTS
"Diabetes Education  Assessment/Teaching    Patient Name:  Nieves Mc  YOB: 1975  MRN: [id number]  Admit Date:  3/11/2021      Assessment Date:  3/11/2021    Most Recent Value   General Information    Referral From:  A1c, Blood glucose, MD order [Consult received due to bs >200. A1c this adm 11.4%. Adm bs 371]   Height  162.6 cm (64\")   Height Method  Stated   Weight  66 kg (145 lb 8.1 oz)   Pregnancy Assessment   Diabetes History   What type of diabetes do you have?  Type 2   Length of Diabetes Diagnosis  -- [dx in 2018]   Do you test your blood sugar at home?  -- [used to check bs but ran out of insurance and has not been checking for past 8 months. She needs new bs meter]   Education Preferences   What areas of diabetes would you like to learn about?  avoiding high blood sugar, avoiding low blood sugar, diabetes complications, medications for diabetes, testing my blood sugar at home   Nutrition Information   Assessment Topics   Taking Medication - Assessment  Needs education   Problem Solving - Assessment  Needs education   Reducing Risk - Assessment  Needs education   Monitoring - Assessment  Needs education   DM Goals   Taking Medication - Goal  Today   Problem Solving - Goal  Today   Reducing Risk - Goal  Today   Monitoring - Goal  Today            Most Recent Value   DM Education Needs   Meter  Meter provided [Gave Accuchek Guide Me and instructed in use. Pt performed return demo correctly. .]   Meter Type  Accuchek   Frequency of Testing  AC/HS   Blood Glucose Target Range  Discussed A1c of 11.4%. Reviewed healthy bs range and healthy A1c target. Discussed importance of bs control   Medication  -- [Pt used to take long-acting insulin bid and mealtime insulin ac but has not been able to take for past 8 months since she didn't have insurance]   Problem Solving  Hypoglycemia, Hyperglycemia, Signs, Symptoms, Treatment [Discussed importance of always carrying low bs tx with her] "   Reducing Risks  A1C testing [Gave A1c info sheet]   Motivation  Engaged   Teaching Method  Explanation, Discussion, Demonstration, Handouts, Teach back   Patient Response  Verbalized understanding, Demonstrates adequately            Other Comments:  Pt lives with spouse and grandchild. Pt states she knows how to use insulin pen. Discussed importance of priming the pen and counting 10 seconds before removing pen needle from tissue after injection given. Also discussed importance of using pen needle one time and disposing. Discussed insulin storage guidelines, time to give long-acting and mealtime insulin, injection sites and rotation of injection sites and disposal of pen needles. Pt agreeable to checking bs ac and hs daily. Gave First Steps booklet, low/high bs handout, log book, and insulin pen instructions and injection site instruction sheet. Pt verbalized understanding of all info. She has no additional questions. Rxs started for lantus pen, pen needles, Accuchek Guide test strips and Accuchek Softclix lancets.       Electronically signed by:  Valerie Bunn RN  03/11/21 16:53 EST

## 2021-03-11 NOTE — H&P
[facility] Medicine Services      Patient Name: Nieves Mc  : 1975  MRN: [id number]  Primary Care Physician: Yohan, No Known  Date of admission: 3/11/2021    Patient Care Team:  Provider, No Known as PCP - General          Subjective   History Present Illness     Chief Complaint:   Chief Complaint   Patient presents with   • Leg Pain         Ms. Mc is a 45 y.o. female with a past medical history of chronic myelocytic leukemia, PE, and type 2 diabetes mellitus who presented to [facility] on 3/11/2021 with bilateral leg pain.  Patient explains that this started 1 week ago.  She reports her right knee has had a shooting constant pain to the point that she was unable to stand on it.  Her left leg has had a sharp pain going through real quick.  She has tried icy hot and Aspercreme at home with no relief.  Pain medication given in the ED has helped.  Walking on her legs makes the pain worse.  Her current pain is a 5 out of 10.  She reports her pain has gotten so bad she has had nausea.  She also reports that she has a knot in the middle of her sternum that went away and came back.  She has a history of CML and has not been on treatment due to insurance.  She last saw Dr. Lerma 7 months ago.  She also reports she was previously on Eliquis for PE, but no longer takes this.  She denies any recent bloody stools, hematemesis, vomiting, diarrhea, fever, chills, cough, shortness breath, or chest pain.  She also explains she has been noncompliant on her diabetes medication because she has not been able to afford them.       In the ED, x-ray right knee unremarkable.  Venous duplex left lower extremity pending.  All labs unremarkable upon admission except blood glucose 371, white blood cells 88.5, hemoglobin 8.9.  All vital signs stable upon admission except blood pressure 154/92.  Patient received 6 units insulin lispro and Toradol in the ED.    Review of Systems    Constitutional: Negative.   HENT: Negative.    Eyes: Positive for visual disturbance.        Patient states the vision in her left eye has been bothering her and she sees blue lines   Cardiovascular: Negative.    Respiratory: Negative.    Skin: Negative.    Musculoskeletal:        Right knee pain, left leg pain    Gastrointestinal: Positive for nausea.   Genitourinary: Negative.    Neurological: Negative.    Psychiatric/Behavioral: Negative.            Personal History     Past Medical History:   Past Medical History:   Diagnosis Date   • Diabetes mellitus (CMS/HCC)    • Leukemia (CMS/HCC) 2019   • Pulmonary embolism (CMS/HCC)        Surgical History:      Past Surgical History:   Procedure Laterality Date   • BONE MARROW BIOPSY     • BREAST SURGERY     • CHOLECYSTECTOMY     • TUBAL ABDOMINAL LIGATION             Family History: family history includes Diabetes in her maternal grandmother and mother. Otherwise pertinent FHx was reviewed and unremarkable.     Social History:  reports that she has never smoked. She has never used smokeless tobacco. She reports that she does not drink alcohol and does not use drugs.      Medications:  Prior to Admission medications    Medication Sig Start Date End Date Taking? Authorizing Provider   apixaban (ELIQUIS) 5 MG tablet tablet Take 5 mg by mouth 2 (Two) Times a Day.    ProviderMelecio MD   azithromycin (ZITHROMAX Z-EDGAR) 250 MG tablet Take 2 tablets the first day, then 1 tablet daily for 4 days. 10/16/19   Chandler Ibarra MD   cefdinir (OMNICEF) 300 MG capsule Take 1 capsule by mouth 2 (Two) Times a Day. 6/30/19   Opal Ashford PA   fluticasone-salmeterol (ADVAIR HFA) 230-21 MCG/ACT inhaler Inhale 2 puffs 2 (Two) Times a Day.    ProviderMelecio MD   insulin aspart (novoLOG) 100 UNIT/ML injection Inject 3 Units under the skin into the appropriate area as directed 3 (Three) Times a Day Before Meals.    Melecio Almanzar MD   Insulin Glargine (BASAGLAR  KWIKPEN SC) Inject 16 Units under the skin into the appropriate area as directed Daily.    Melecio Almanzar MD   Insulin Glargine (BASAGLAR KWIKPEN SC) Inject 8 Units under the skin into the appropriate area as directed Every Night.    Melecio Almanzar MD   naproxen (NAPROSYN) 375 MG tablet Take 1 tablet by mouth 2 (Two) Times a Day As Needed for Moderate Pain . 2/22/21   Chandler Ibarra MD   nilotinib (TASIGNA) 150 MG capsule capsule Take 150 mg by mouth 2 (Two) Times a Day Before Meals.    Melecio Almanzar MD   phenazopyridine (PYRIDIUM) 95 MG tablet Take 1 tablet by mouth 3 (Three) Times a Day As Needed for bladder spasms. 6/30/19   Opal Ashford PA   prochlorperazine (COMPAZINE) 10 MG tablet Take 1 tablet by mouth Every 6 (Six) Hours As Needed for Nausea or Vomiting. 7/2/19   Rao Ballesteros MD   sucralfate (CARAFATE) 1 g tablet Take 1 g by mouth Daily.    Melecio Almanzar MD   traMADol (ULTRAM) 50 MG tablet Take 50 mg by mouth Every 6 (Six) Hours As Needed for Moderate Pain .    Melecio Almanzar MD       Allergies:    Allergies   Allergen Reactions   • Hydromorphone GI Intolerance   • Morphine Nausea And Vomiting   • Propofol Hives       Objective   Objective     Vital Signs  Temp:  [97.7 °F (36.5 °C)] 97.7 °F (36.5 °C)  Heart Rate:  [88-92] 88  Resp:  [17-19] 17  BP: (146-154)/(88-92) 146/88  SpO2:  [96 %-100 %] 96 %  on   ;   Device (Oxygen Therapy): room air  Body mass index is 24.98 kg/m².    Physical Exam  Vitals reviewed.   Constitutional:       Appearance: Normal appearance. She is normal weight.   HENT:      Head: Normocephalic and atraumatic.      Nose: Nose normal.      Mouth/Throat:      Mouth: Mucous membranes are moist.      Pharynx: Oropharynx is clear.   Eyes:      Extraocular Movements: Extraocular movements intact.      Conjunctiva/sclera: Conjunctivae normal.      Pupils: Pupils are equal, round, and reactive to light.   Cardiovascular:      Rate and Rhythm:  Normal rate and regular rhythm.      Pulses: Normal pulses.      Heart sounds: Normal heart sounds.      Comments: S1, S2 audible  Hard lump noted in middle of sternum, tender to touch   Pulmonary:      Effort: Pulmonary effort is normal.      Breath sounds: Normal breath sounds.      Comments: On room air   Abdominal:      General: Abdomen is flat. Bowel sounds are normal.      Palpations: Abdomen is soft.   Musculoskeletal:         General: Normal range of motion.      Cervical back: Normal range of motion.   Skin:     General: Skin is warm and dry.   Neurological:      General: No focal deficit present.      Mental Status: She is alert and oriented to person, place, and time. Mental status is at baseline.   Psychiatric:         Mood and Affect: Mood normal.         Behavior: Behavior normal.         Thought Content: Thought content normal.         Judgment: Judgment normal.           Results Review:  I have personally reviewed most recent cardiac tracings, lab results and radiology images and interpretations and agree with findings.    Results from last 7 days   Lab Units 03/11/21  0540   WBC 10*3/mm3 88.50*   HEMOGLOBIN g/dL 8.9*   HEMATOCRIT % 28.8*   PLATELETS 10*3/mm3 273     Results from last 7 days   Lab Units 03/11/21  0540   SODIUM mmol/L 135*   POTASSIUM mmol/L 3.7   CHLORIDE mmol/L 100   CO2 mmol/L 24.0   BUN mg/dL 5*   CREATININE mg/dL 0.59   GLUCOSE mg/dL 371*   CALCIUM mg/dL 9.3   ALT (SGPT) U/L 11   AST (SGOT) U/L 17     Estimated Creatinine Clearance: 112.5 mL/min (by C-G formula based on SCr of 0.59 mg/dL).  Brief Urine Lab Results     None          Microbiology Results (last 10 days)     ** No results found for the last 240 hours. **          ECG/EMG Results (most recent)     None                  XR Knee 1 or 2 View Right    Result Date: 3/11/2021  No radiographic evidence of acute fracture or dislocation.  Electronically Signed By-Bill Menjivar MD On:3/11/2021 7:11 AM This report was finalized  on [id number] by  Bill Menjivar MD.        Estimated Creatinine Clearance: 112.5 mL/min (by C-G formula based on SCr of 0.59 mg/dL).    Assessment/Plan   Assessment/Plan       Active Hospital Problems    Diagnosis  POA   • **Leukemia not having achieved remission (CMS/[facility]) [C95.90]  Yes     Priority: High   • Right knee pain [M25.561]  Yes     Priority: High   • Left leg pain [M79.605]  Yes     Priority: High   • Anemia [D64.9]  Yes     Priority: High   • Visual changes [H53.9]  Yes     Priority: High   • CML (chronic myelocytic leukemia) (CMS/[facility]) [C92.10]  Yes     Priority: High   • History of pulmonary embolism [Z86.711]  Yes   • Medically noncompliant [Z91.19]  Not Applicable   • Type 2 diabetes mellitus without complication, without long-term current use of insulin (CMS/[facility]) [E11.9]  Yes      Resolved Hospital Problems   No resolved problems to display.     Chronic Leukemia  - WBC 88.5, monitor   - Previously on nilotinib, has not been taking due to insurance    - Hematology/oncology consulted     Acute anemia  - Hbg 8.9, monitor  - Type and screen  - Will transfuse if hbg < 7.0   - Anemia studies ordered     Right knee pain   - x-ray right knee unremarkable.    - Received toradol in ED  - Pain medication ordered     LLE pain   - Venous duplex left lower extremity unremarkable   - Neurovascular checks, good pedal pulses noted on exam   - Fall risk precautions     Acute visual disturbances  - likely secondary to uncontrolled diabetes  - Encourage outpatient follow-up with opthalmologist    Uncontrolled Type II DM  -  upon admission, received 6 units insulin lispro   - Start sliding scale, Accuchecks ACHS   - Start Lantus 20 units nightly   - Check hbg A1C  - Diabetes educator consulted  - Noncompliance on medication     History of PE  - Previously on eliquis     Noncompliance with medications  - Patient reports due to her insurance   - Consult          VTE Prophylaxis - SCD's     CODE  STATUS:    Code Status and Medical Interventions:   Ordered at: 03/11/21 0803     Level Of Support Discussed With:    Patient     Code Status:    CPR     Medical Interventions (Level of Support Prior to Arrest):    Full       This patient has been examined wearing appropriate Personal Protective Equipment. 03/11/21      I discussed the patient's findings and my recommendations with patient.      Signature: Electronically signed by JP Montilla, 03/11/21, 8:21 AM EST.    [facility] Hospitalist Team      I have reviewed the notes, assessments, and/or procedures performed by JP Montilla, I concur with her/his documentation of Hope A Jesusita.  Patient seen and examined agree with above, is a 45-year-old female with a past medical history significant for chronic myelocytic leukemia, pulmonary embolism, type 2 diabetes presented to the ER with bilateral leg pain.  For the last 7 days.  Reports right knee pain, shooting in nature, constant, has difficulty ambulating.  Left leg is a sharp pain, intermittent.  Apparently has not gotten her insurance, has seen the oncologist several months ago but did not start chemotherapy.  On exam this is a 45-year-old female in bed no acute distress, neck is supple, lungs clear to auscultation, heart regular rhythm normal S1-S2, abdomen is soft nontender non distended, extremities no clubbing, claudication or edema, neurological exam no focal deficit, assessment and plan  1.  Chronic leukemia WBC count 88, hematology consulted, monitor CBC.  2.  Acute anemia hemoglobin 8.9.  Check iron, ferritin, monitor hemoglobin, transfuse if less than 7.  3.  Right knee pain.  X-ray of the knee unremarkable.  Received Toradol.  In the ER pain medicine as needed.  Electronically signed by Aristeo Noel MD, 03/13/21, 3:55 PM EST.

## 2021-03-11 NOTE — CONSULTS
Palliative Care Consultation    Patient Name: Nieves Mc  : 1975  MRN: [id number]  Allergies: Hydromorphone, Morphine, and Propofol    Requesting clinician:  Mirna  Reason for consult: Consultation for clarification of goals of care and code status.      Patient Code Status:   Code Status and Medical Interventions:   Ordered at: 21 0803     Level Of Support Discussed With:    Patient     Code Status:    CPR     Medical Interventions (Level of Support Prior to Arrest):    Full           Advanced Care Planning    Advanced Directives: Patient does not have advance directive  Health Care Directive on file: No  Health Care Surrogate:      Palliative Performance Scale Score:    Comments: No ACP        Chief Complaint:    Knee pain    History of Present Illness    Nieves Mc is a 45 y.o. female who presented to Ed on 3/11 with complaints of bilateral knee pain. Patient stated the pain started 1 week ago and is a constant shooting pain. She has used icy hot with no relief. Denied fever, chills, nausea, and vomiting. No shortness of breath or chest pain.  Per patient, she has CML and previously saw Dr. Lerma. She has not been taking her medications due to financial reasons. Dr. Lerma. She has been consulted this admission.     In the ED, x-ray right knee unremarkable.  Venous duplex left lower extremity negative.  All labs unremarkable upon admission except blood glucose 371, white blood cells 88.5, hemoglobin 8.9.  All vital signs stable upon admission except blood pressure 154/92.  Patient received 6 units insulin lispro and Toradol in the ED.    3/11 Palliative Consult for discussion on goals of care.         VITAL SIGNS:   Temp:  [97.7 °F (36.5 °C)-98.3 °F (36.8 °C)] 98.3 °F (36.8 °C)  Heart Rate:  [88-92] 91  Resp:  [17-19] 17  BP: (146-160)/(88-98) 160/98       PMH: DM, PE    Past Surgical History:   Procedure Laterality Date   • BONE MARROW BIOPSY     • BREAST SURGERY     • CHOLECYSTECTOMY     •  TUBAL ABDOMINAL LIGATION         Family History   Problem Relation Age of Onset   • Diabetes Mother    • Diabetes Maternal Grandmother        Social History     Tobacco Use   • Smoking status: Never Smoker   • Smokeless tobacco: Never Used   Substance Use Topics   • Alcohol use: No   • Drug use: No           LABS:    Results from last 7 days   Lab Units 03/11/21  0540   WBC 10*3/mm3 88.50*   HEMOGLOBIN g/dL 8.9*   HEMATOCRIT % 28.8*   PLATELETS 10*3/mm3 273     Results from last 7 days   Lab Units 03/11/21  0540   SODIUM mmol/L 135*   POTASSIUM mmol/L 3.7   CHLORIDE mmol/L 100   CO2 mmol/L 24.0   BUN mg/dL 5*   CREATININE mg/dL 0.59   GLUCOSE mg/dL 371*   CALCIUM mg/dL 9.3     Results from last 7 days   Lab Units 03/11/21  0540   SODIUM mmol/L 135*   POTASSIUM mmol/L 3.7   CHLORIDE mmol/L 100   CO2 mmol/L 24.0   BUN mg/dL 5*   CREATININE mg/dL 0.59   CALCIUM mg/dL 9.3   BILIRUBIN mg/dL 0.9   ALK PHOS U/L 130*   ALT (SGPT) U/L 11   AST (SGOT) U/L 17   GLUCOSE mg/dL 371*         IMAGING STUDIES:  XR Knee 1 or 2 View Right    Result Date: 3/11/2021  No radiographic evidence of acute fracture or dislocation.  Electronically Signed By-Bill Menjivar MD On:3/11/2021 7:11 AM This report was finalized on [id number] by  Bill Menjivar MD.        I reviewed the patient's new clinical results including labs, imaging, and vitals.        Scheduled Meds:  insulin glargine, 20 Units, Subcutaneous, Nightly  insulin lispro, 0-9 Units, Subcutaneous, TID AC  sodium chloride, 10 mL, Intravenous, Q12H      Continuous Infusions:         Review of Systems   Constitutional: Positive for appetite change, fatigue and unexpected weight change.   Eyes: Positive for visual disturbance.   Musculoskeletal: Positive for myalgias.   Psychiatric/Behavioral: Positive for suicidal ideas. The patient is nervous/anxious.    All other systems reviewed and are negative.        Physical Exam  Constitutional:       Appearance: She is normal weight.   HENT:       Head: Normocephalic and atraumatic.      Nose: Nose normal.      Mouth/Throat:      Mouth: Mucous membranes are moist.      Pharynx: Oropharynx is clear.   Eyes:      Extraocular Movements: Extraocular movements intact.      Conjunctiva/sclera: Conjunctivae normal.      Pupils: Pupils are equal, round, and reactive to light.   Cardiovascular:      Rate and Rhythm: Normal rate.      Pulses: Normal pulses.   Pulmonary:      Effort: Pulmonary effort is normal.   Abdominal:      General: Abdomen is flat.   Skin:     General: Skin is warm and dry.   Neurological:      General: No focal deficit present.      Mental Status: She is alert. Mental status is at baseline.   Psychiatric:         Mood and Affect: Mood normal.         Behavior: Behavior normal.         Thought Content: Thought content normal.         Judgment: Judgment normal.             PROBLEM LIST:    Leukemia not having achieved remission (CMS/[facility])    CML (chronic myelocytic leukemia) (CMS/[facility])    Type 2 diabetes mellitus without complication, without long-term current use of insulin (CMS/[facility])    Right knee pain    Left leg pain    Anemia    History of pulmonary embolism    Medically noncompliant    Visual changes          ASSESSMENT/PLAN:    Goals of care: Met with patient to discuss goals of care. She has recently not been taking her chemotherapy for her CML due to insurance issues. Patient does want to restart chemotherapy pill if it is an option from her oncologist. We did discuss advanced directives. She has none currently in place. If her heart were to stop, she would want CPR. She is going to discuss with her family what her wishes would be in completing a living will and who she would want to designate as her health care surrogate. Cody to follow up to complete documentation.   CML: WBC elevated at 88.5. Previously on nilotinib, has not been taking due to financial issues  Anemia: Secondary to CML. Anemia workup ordered.  Leg pain: X-ray normal,  DVT negative. Patient on pain meds per primary. She states her pain is shooting down her legs and is hot and tingling. May be secondary to poorly controlled diabetes. Could potentially be related to neuropathy and may benefit from neurotin.   Depression: Speaks of thoughts of suicide in the past. Says she was working with [employer] but is no longer current. Will let case management know to place referral.   DM/HxofPE: Per primary    **May benefit from neurotin. Plans for [facility] referral.     Decisional Capacity: intact  Patient's understanding of illness:   Patient goals of care:  Full code, does want to continue treatment. Would like to continue advanced directives with Cody.       Thank you for this consult and allowing us to participate in patient's plan of care. Palliative Care Team will continue to follow patient.     Time spent: 61 minutes spent reviewing medical and medication records, assessing and examining patient, discussing with family, answering questions, providing some guidance about a plan and documentation of care, and coordinating care with other healthcare members, with > 50% time spent face to face.         Staci Collier, APRN  3/11/2021

## 2021-03-11 NOTE — ED NOTES
Updated pt on plan of care and admission. Pt agreeable to admission. No concerns or questions. Pt stable and placed on full monitor by this RN. Call light in reach.     Melida Stringer RN  03/11/21 [id number]

## 2021-03-11 NOTE — ED PROVIDER NOTES
Subjective   History of Present Illness  Context: 45-year-old female presents with right knee pain left leg pain.  She states she has been having pain for about a week.  She states her vision has been blurry when she bends over.  She has had nausea.  She has had no cough no vomiting no diarrhea.  She reports she has had weight loss.  She has been diagnosed with leukemia she is not sure what type but has not had treatment for several months due to losing her insurance.  She does report weight loss.  Location: Right knee left leg  Quality: Pain  Duration: 1 week  Timing: Constant  Severity: Severe   Modifying factors: History of leukemia currently not being treated  Associated signs and symptoms: Weight loss    Review of Systems   Constitutional: Positive for appetite change and unexpected weight change. Negative for fever.   HENT: Negative for congestion and sore throat.    Eyes: Positive for visual disturbance. Negative for pain. Photophobia: Pdispo.   Respiratory: Negative for cough and shortness of breath.    Cardiovascular: Negative for chest pain and leg swelling.   Gastrointestinal: Negative for abdominal pain, diarrhea and vomiting.   Endocrine: Negative for polydipsia and polyuria.   Genitourinary: Negative for dysuria and urgency.   Musculoskeletal: Negative for back pain.        Right knee and left leg pain   Skin: Negative for rash.   Neurological: Positive for dizziness. Negative for weakness and headaches.   Psychiatric/Behavioral: Negative for confusion.       Past Medical History:   Diagnosis Date   • Diabetes mellitus (CMS/HCC)    • Leukemia (CMS/HCC) 2019   • Pulmonary embolism (CMS/HCC)        Allergies   Allergen Reactions   • Hydromorphone GI Intolerance   • Morphine Nausea And Vomiting   • Propofol Hives       Past Surgical History:   Procedure Laterality Date   • BONE MARROW BIOPSY     • BREAST SURGERY     • CHOLECYSTECTOMY     • TUBAL ABDOMINAL LIGATION         No family history on  file.    Social History     Socioeconomic History   • Marital status:      Spouse name: Not on file   • Number of children: Not on file   • Years of education: Not on file   • Highest education level: Not on file   Tobacco Use   • Smoking status: Never Smoker   • Smokeless tobacco: Never Used   Substance and Sexual Activity   • Alcohol use: No   • Drug use: No   • Sexual activity: Defer     No current medications      Objective   Physical Exam  45-year-old female awake alert.  Generally she is well-developed well-nourished in appearance.  Pupils equal round at light.  Oropharynx clear neck supple chest clear equal breath sounds.  Cardiovascular regular rate and rhythm.  Abdomen soft nontender.  Examination extremities she complains of pain around right knee.  There is no erythema warmth or effusion.  She complains of left leg pain.  There is mild edema in her left leg compared to her right.  She complains of generalized tenderness.  Procedures           ED Course      Results for orders placed or performed during the hospital encounter of 03/11/21   Comprehensive Metabolic Panel    Specimen: Blood   Result Value Ref Range    Glucose 371 (H) 65 - 99 mg/dL    BUN 5 (L) 6 - 20 mg/dL    Creatinine 0.59 0.57 - 1.00 mg/dL    Sodium 135 (L) 136 - 145 mmol/L    Potassium 3.7 3.5 - 5.2 mmol/L    Chloride 100 98 - 107 mmol/L    CO2 24.0 22.0 - 29.0 mmol/L    Calcium 9.3 8.6 - 10.5 mg/dL    Total Protein 6.5 6.0 - 8.5 g/dL    Albumin 3.70 3.50 - 5.20 g/dL    ALT (SGPT) 11 1 - 33 U/L    AST (SGOT) 17 1 - 32 U/L    Alkaline Phosphatase 130 (H) 39 - 117 U/L    Total Bilirubin 0.9 0.0 - 1.2 mg/dL    eGFR Non African Amer 110 >60 mL/min/1.73    Globulin 2.8 gm/dL    A/G Ratio 1.3 g/dL    BUN/Creatinine Ratio 8.5 7.0 - 25.0    Anion Gap 11.0 5.0 - 15.0 mmol/L   CBC Auto Differential    Specimen: Blood   Result Value Ref Range    WBC 88.50 (C) 3.40 - 10.80 10*3/mm3    RBC 3.42 (L) 3.77 - 5.28 10*6/mm3    Hemoglobin 8.9 (L)  "12.0 - 15.9 g/dL    Hematocrit 28.8 (L) 34.0 - 46.6 %    MCV 84.4 79.0 - 97.0 fL    MCH 26.2 (L) 26.6 - 33.0 pg    MCHC 31.0 (L) 31.5 - 35.7 g/dL    RDW 17.8 (H) 12.3 - 15.4 %    RDW-SD 52.1 37.0 - 54.0 fl    MPV 8.2 6.0 - 12.0 fL    Platelets 273 140 - 450 10*3/mm3   Light Blue Top   Result Value Ref Range    Extra Tube hold for add-on    Gold Top - SST   Result Value Ref Range    Extra Tube Hold for add-ons.      No radiology results for the last day  Medications   sodium chloride 0.9 % flush 10 mL (has no administration in time range)   insulin lispro (ADMELOG) injection 6 Units (has no administration in time range)   ketorolac (TORADOL) injection 15 mg (15 mg Intravenous Given 3/11/21 0538)     /88   Pulse 88   Temp 97.7 °F (36.5 °C) (Oral)   Resp 17   Ht 162.6 cm (64\")   Wt 66 kg (145 lb 8.1 oz)   SpO2 100%   BMI 24.98 kg/m²                                        MDM  Chart review: Patient's last oncology  Comorbidity: As per past history note is from July 2019  Differential: Pathologic fracture, bone pain,  My EKG interpretation:   Lab: White count 88,500 with hemoglobin 8.9 platelet count of 273 differential still pending.  Comprehensive metabolic panel glucose 371 alkaline phosphatase 130  Radiology: I reviewed x-ray of right knee no acute bony abnormality noted  Ultrasound of left leg negative for DVT  Discussion/treatment: Patient had IV placed.  She was given ketorolac.  Will check hemoglobin A1c.  With her history of PE and left leg pain we will also check ultrasound of left leg.  She was given insulin. patient was discussed with Dr. Lei she will be brought in for observation.  Obtain hematology consultation.  Patient was evaluated using appropriate PPE      Final diagnoses:   Leukemia not having achieved remission, unspecified leukemia type (CMS/HCC)   Uncontrolled type 2 diabetes mellitus with hyperglycemia (CMS/HCC)   Acute pain of right knee   Left leg pain            Syed Fournier " MD [facility]  03/11/21 0656       Syed Fournier MD  03/11/21 0715

## 2021-03-11 NOTE — PROGRESS NOTES
Continued Stay Note  ZOHAIB Amor     Patient Name: Nieves Mc  MRN: [id number]  Today's Date: 3/11/2021    Admit Date: 3/11/2021    Discharge Plan     Row Name 03/11/21 1408       Plan    Plan  Anticipate routine home    Plan Comments  SW received text from CM regarding the following issues: non-compliance with medications d/t financial concerns, recently uninsured and now with Medicare, questions regarding disability, and palliative consult for advance directive. SW met with pt at the bedside to discuss the above issues. Pt verified that she now has Medicare (Parts A and B, per pt's Medicare card) and stated that she did not know if her oncology visits would be covered. SW explained coverage for Part A vs. Part B. Pt also reports that she had applied for Medicaid and is awaiting determination. SW explained that if she is approved for [place] Medicaid, this would act as a secondary payor to cover any costs not covered by Medicare. Pt verbalized understanding and obtained permission to check if a Medicaid determination has been made. Pt consented. LORENZO also discussed social issues related to pt's cancer diagnosis with pt. Pt reports feeling unsupported by her family members who she states will not engage with the her in conversation regarding her diagnosis or prognosis. Because of this, pt reports she is not able to cope effectively with the distress caused by her health status. SW asked if pt had been given any social support resource information and pt stated that she had not. LORENZO discussed [employer] as well as [employer] with pt. Pt is agreeable to referral to [employer] and is agreeable to resource sheet for [employer]. LORENZO sent e-mail to [facility] [employer] to inquire if pt's [place] Medicaid application had been given a determination. SW also made referral to [employer] via phone call. SW to provide [employer] resource sheet during follow-up. SW also following for assistance with  medications.        Met with patient in room wearing PPE: mask, face shield/goggles, gloves, gown.      Maintained distance greater than six feet and spent less than 15 minutes in the room.      Chiquita [facility]ler [facility] [place]    Office: [phone]  Cell: [phone]  Fax: [phone]  E-mail: [email or web address]

## 2021-03-11 NOTE — ED NOTES
0732- called Joey in Hematology, he is adding on the Hemoglobin A1c to blood that is in the lab.      Thuy Wilkerson  03/11/21 0733

## 2021-03-11 NOTE — PROGRESS NOTES
Discharge Planning Assessment  [facility]     Patient Name: Nieves Mc  MRN: [id number]  Today's Date: 3/11/2021    Admit Date: 3/11/2021    Discharge Needs Assessment     Row Name 03/11/21 1201       Living Environment    Lives With  spouse;grandchild(tobin)    Current Living Arrangements  home/apartment/condo    Primary Care Provided by  self    Provides Primary Care For  grandchild(tobin)    Family Caregiver if Needed  spouse    Quality of Family Relationships  helpful    Able to Return to Prior Arrangements  yes       Resource/Environmental Concerns    Resource/Environmental Concerns  none    Transportation Concerns  car, none       Transition Planning    Patient/Family Anticipates Transition to  home with family    Patient/Family Anticipated Services at Transition      Transportation Anticipated  family or friend will provide       Discharge Needs Assessment    Readmission Within the Last 30 Days  no previous admission in last 30 days    Equipment Currently Used at Home  none    Concerns to be Addressed  financial/insurance    Anticipated Changes Related to Illness  none    Equipment Needed After Discharge  other (see comments) DM educator consult has been placed, may need a glucometer    Current Discharge Risk  financial support inadequate        Discharge Plan     Row Name 03/11/21 1202       Plan    Plan  Anticipate routine home    Patient/Family in Agreement with Plan  yes    Plan Comments  Met with patient at bedside, reports she lives at home with spouse and dependent grandchild. Normally I with ADLs. Still drives at times but spouse able to assist with transportation. Does not have a PCP and requested one be made prior to d/c. Pharmacy confirmed, however discussed financial cost concerns of medications and patient enrolled in [employer] program. Patient stated she recently got on Medicare insurance and she is working on Medicaid at this time and its still pending. Chiquita VENTURA notified. Palliative care  consulted. DM educator consulted. Will follow. DC barriers: hem/onc consulted       Demographic Summary     Row Name 03/11/21 1200       General Information    Admission Type  observation    Arrived From  emergency department    Required Notices Provided  Observation Status Notice    Referral Source  admission list    Reason for Consult  discharge planning    Preferred Language  English        Functional Status     Row Name 03/11/21 1200       Functional Status    Usual Activity Tolerance  good    Current Activity Tolerance  moderate       Functional Status, IADL    Medications  independent    Meal Preparation  independent    Housekeeping  independent    Laundry  independent    Shopping  independent          Patient Forms     Row Name 03/11/21 1204       Patient Forms    Important Message from Medicare ([facility])  -- Medina 3/11    Patient Observation Letter  Delivered Medina 3/11        Met with patient in room wearing PPE: mask, goggles.    Maintained distance greater than six feet and spent less than 15 minutes in the room.          Radha Metcalf RN

## 2021-03-11 NOTE — CONSULTS
Hematology/Oncology Inpatient Consultation    Patient name: Nieves Mc  : 1975  MRN: [id number]  Referring Provider: Daniella Bradley APRN  Reason for Consultation: Leukemia    Chief complaint: Bilateral leg pain    History of present illness:    Nieves Mc is a 45 y.o. female who presented to [facility] on 3/11/2021 with complaints of bilateral leg pain that started 1 week ago.  She complained of a shooting constant pain to her right knee and sharp pain in her left leg.  X-ray of her right knee in the ED showed no evidence of acute fracture or dislocation.  Bilateral lower extremity Doppler was normal.  Labs in the ED were significant for WBC 88.50, hemoglobin 8.9, glucose 371, sodium 135, alkaline phosphatase 130.  Peripheral smear resulted marked leukocytosis with immature granulocytes and scattered blasts consistent with known CML.  Blasts less than 20%.  Patient was admitted for further evaluation management.    21  Hematology/Oncology was consulted as patient is known to our service and followed by Dr. Meghann Lerma for CML.  Patient has been treated with imatinib in the past.  In 2019 patient was started on to Tasigna.  Tasigna was briefly stopped due to QT prolongation, but then restarted.  Patient was last seen for a follow-up appointment on 2019 per [facility] with plans to follow-up 3 weeks later.  Patient has not followed up since then.    She  has a past medical history of Diabetes mellitus (CMS/HCC), Leukemia (CMS/HCC) (2019), and Pulmonary embolism (CMS/HCC).    PCP: Provider, No Known    History:  Past Medical History:   Diagnosis Date   • Diabetes mellitus (CMS/HCC)    • Leukemia (CMS/HCC) 2019   • Pulmonary embolism (CMS/HCC)    ,   Past Surgical History:   Procedure Laterality Date   • BONE MARROW BIOPSY     • BREAST SURGERY     • CHOLECYSTECTOMY     • TUBAL ABDOMINAL LIGATION     ,   Family History   Problem Relation Age of Onset   • Diabetes Mother  "   • Diabetes Maternal Grandmother    ,   Social History     Tobacco Use   • Smoking status: Never Smoker   • Smokeless tobacco: Never Used   Substance Use Topics   • Alcohol use: No   • Drug use: No   ,   No medications prior to admission.   , Scheduled Meds:  allopurinol, 300 mg, Oral, Nightly  gabapentin, 100 mg, Oral, Q8H  hydroxyurea, 1,000 mg, Oral, BID  insulin glargine, 20 Units, Subcutaneous, Nightly  insulin lispro, 0-9 Units, Subcutaneous, TID AC  sodium chloride, 10 mL, Intravenous, Q12H    , Continuous Infusions:   , PRN Meds:  •  acetaminophen **OR** acetaminophen **OR** acetaminophen  •  dextrose  •  dextrose  •  glucagon (human recombinant)  •  insulin lispro **AND** insulin lispro  •  ketamine (KETALAR) IVPB **AND** Ketamine Vital Signs & Assessment  •  ketorolac  •  magnesium sulfate **OR** magnesium sulfate in D5W 1g/100mL (PREMIX)  •  ondansetron **OR** ondansetron  •  potassium chloride  •  [COMPLETED] Insert peripheral IV **AND** sodium chloride  •  sodium chloride   Allergies:  Hydromorphone, Morphine, and Propofol    Subjective     ROS:  Review of Systems   Constitutional: Negative for chills, fatigue and fever.   HENT: Negative for mouth sores and nosebleeds.    Eyes: Negative for photophobia.   Respiratory: Negative for cough and shortness of breath.    Cardiovascular: Negative for chest pain and leg swelling.   Gastrointestinal: Negative for constipation, diarrhea and vomiting.   Endocrine: Negative for cold intolerance.   Musculoskeletal: Positive for myalgias.   Skin: Negative for rash and wound.   Neurological: Negative for dizziness, facial asymmetry and light-headedness.   Psychiatric/Behavioral: Negative for confusion. The patient is not nervous/anxious.       I have reviewed and confirmed the accuracy of the ROS as documented by the MA/LPN/RN Meghann Lerma MD    Objective   Vital Signs:   /86   Pulse 88   Temp 98.2 °F (36.8 °C)   Resp 17   Ht 162.6 cm (64\")   Wt " 66 kg (145 lb 8.1 oz)   SpO2 99%   BMI 24.98 kg/m²     Physical Exam: (performed by MD)  Physical Exam  Constitutional:       General: She is not in acute distress.     Appearance: Normal appearance. She is not toxic-appearing.   HENT:      Head: Normocephalic and atraumatic.   Eyes:      General: No scleral icterus.        Right eye: No discharge.         Left eye: No discharge.      Extraocular Movements: Extraocular movements intact.   Cardiovascular:      Rate and Rhythm: Normal rate and regular rhythm.      Pulses: Normal pulses.   Pulmonary:      Effort: Pulmonary effort is normal. No respiratory distress.   Abdominal:      General: There is no distension.      Palpations: Abdomen is soft.      Tenderness: There is no abdominal tenderness.   Musculoskeletal:         General: Normal range of motion.      Cervical back: Normal range of motion.   Skin:     General: Skin is warm.   Neurological:      General: No focal deficit present.      Mental Status: She is alert and oriented to person, place, and time.   Psychiatric:         Mood and Affect: Mood normal.         Behavior: Behavior normal.     I have reexamined the patient and the results are consistent with the previously documented exam. Meghann Lerma MD   Results Review:  Lab Results (last 48 hours)     Procedure Component Value Units Date/Time    Ferritin [[id number]]  (Normal) Collected: 03/11/21 0540    Specimen: Blood Updated: 03/11/21 0919     Ferritin 58.23 ng/mL     Narrative:      Results may be falsely decreased if patient taking Biotin.      Iron [[id number]]  (Normal) Collected: 03/11/21 0540    Specimen: Blood Updated: 03/11/21 0914     Iron 56 mcg/dL     Vitamin B12 [[id number]] Collected: 03/11/21 0540    Specimen: Blood Updated: 03/11/21 0857    Folate [[id number]] Collected: 03/11/21 0540    Specimen: Blood Updated: 03/11/21 0853    POC Glucose Once [[id number]]  (Abnormal) Collected: 03/11/21 0837    Specimen: Blood Updated: 03/11/21  0839     Glucose 247 mg/dL      Comment: Serial Number: [id number]:  [id number]       Pathology Consultation [[id number]] Collected: 03/11/21 0540    Specimen: Blood, Venous Line Updated: 03/11/21 0835     Final Diagnosis --     Marked leukocytosis with immature granulocytes and scattered blasts consistent with known CML  Blasts less than 20%       Case Report --     Surgical Pathology Report                         Case: [id number]                                  Authorizing Provider:  Syed Fournier MD       Collected:           03/11/2021 05:40 AM          Ordering Location:     [facility]       Received:            03/11/2021 07:47 AM                                 EMERGENCY DEPARTMENT                                                         Pathologist:           Amador Jackson MD                                                            Specimen:    Blood, Venous Line                                                                         Hemoglobin A1c [[id number]] Collected: 03/11/21 0540    Specimen: Blood Updated: 03/11/21 0749    COVID PRE-OP / PRE-PROCEDURE SCREENING ORDER (NO ISOLATION) - Swab, Nasopharynx [[id number]] Collected: 03/11/21 0731    Specimen: Swab from Nasopharynx Updated: 03/11/21 0734    Narrative:      The following orders were created for panel order COVID PRE-OP / PRE-PROCEDURE SCREENING ORDER (NO ISOLATION) - Swab, Nasopharynx.  Procedure                               Abnormality         Status                     ---------                               -----------         ------                     COVID-19,APTIMA PANTHER,...[[id number]]                      In process                   Please view results for these tests on the individual orders.    COVID-19,APTIMA PANTHERZEYAD IN-HOUSE, NP/OP SWAB IN UTM/VTM/SALINE TRANSPORT MEDIA,24 HR TAT - Swab, Nasopharynx [[id number]] Collected: 03/11/21 0731    Specimen: Swab from Nasopharynx Updated: 03/11/21 0734    Manual  Differential [[id number]]  (Abnormal) Collected: 03/11/21 0540    Specimen: Blood Updated: 03/11/21 0658     Neutrophil % 52.0 %      Lymphocyte % 7.0 %      Monocyte % 2.0 %      Eosinophil % 6.0 %      Basophil % 1.0 %      Bands %  10.0 %      Metamyelocyte % 8.0 %      Myelocyte % 4.0 %      Blasts % 7.0 %      Prolymphocyte % 3.0 %      Neutrophils Absolute 54.87 10*3/mm3      Lymphocytes Absolute 6.20 10*3/mm3      Monocytes Absolute 1.77 10*3/mm3      Eosinophils Absolute 5.31 10*3/mm3      Basophils Absolute 0.89 10*3/mm3      nRBC 13.0 /100 WBC      Anisocytosis Slight/1+     WBC Morphology Normal     Platelet Morphology Normal    Path Consult Reflex [[id number]] Collected: 03/11/21 0540    Specimen: Blood Updated: 03/11/21 0658     Pathology Review Yes    CBC & Differential [[id number]]  (Abnormal) Collected: 03/11/21 0540    Specimen: Blood Updated: 03/11/21 0658    Narrative:      The following orders were created for panel order CBC & Differential.  Procedure                               Abnormality         Status                     ---------                               -----------         ------                     Scan Slide[[id number]]                                       Final result               CBC Auto Differential[[id number]]        Abnormal            Final result                 Please view results for these tests on the individual orders.    CBC Auto Differential [[id number]]  (Abnormal) Collected: 03/11/21 0540    Specimen: Blood Updated: 03/11/21 0658     WBC 88.50 10*3/mm3      RBC 3.42 10*6/mm3      Hemoglobin 8.9 g/dL      Hematocrit 28.8 %      MCV 84.4 fL      MCH 26.2 pg      MCHC 31.0 g/dL      RDW 17.8 %      RDW-SD 52.1 fl      MPV 8.2 fL      Platelets 273 10*3/mm3     Scan Slide [[id number]] Collected: 03/11/21 0540    Specimen: Blood Updated: 03/11/21 0658     Scan Slide --     Comment: See Manual Differential Results       Extra Tubes [[id number]] Collected: 03/11/21 0540     Specimen: Blood Updated: 03/11/21 0645    Narrative:      The following orders were created for panel order Extra Tubes.  Procedure                               Abnormality         Status                     ---------                               -----------         ------                     Light Blue Top[[id number]]                                   Final result               Gold Top - SST[[id number]]                                   Final result                 Please view results for these tests on the individual orders.    Light Blue Top [[id number]] Collected: 03/11/21 0540    Specimen: Blood Updated: 03/11/21 0645     Extra Tube hold for add-on     Comment: Auto resulted       Gold Top - SST [[id number]] Collected: 03/11/21 0540    Specimen: Blood Updated: 03/11/21 0645     Extra Tube Hold for add-ons.     Comment: Auto resulted.       Comprehensive Metabolic Panel [[id number]]  (Abnormal) Collected: 03/11/21 0540    Specimen: Blood Updated: 03/11/21 0612     Glucose 371 mg/dL      BUN 5 mg/dL      Creatinine 0.59 mg/dL      Sodium 135 mmol/L      Potassium 3.7 mmol/L      Chloride 100 mmol/L      CO2 24.0 mmol/L      Calcium 9.3 mg/dL      Total Protein 6.5 g/dL      Albumin 3.70 g/dL      ALT (SGPT) 11 U/L      AST (SGOT) 17 U/L      Alkaline Phosphatase 130 U/L      Total Bilirubin 0.9 mg/dL      eGFR Non African Amer 110 mL/min/1.73      Globulin 2.8 gm/dL      A/G Ratio 1.3 g/dL      BUN/Creatinine Ratio 8.5     Anion Gap 11.0 mmol/L     Narrative:      GFR Normal >60  Chronic Kidney Disease <60  Kidney Failure <15           Pending Results: Folate, vitamin B12, iron profile, reticulocytes, LDH, haptoglobin, BCR-ABL, uric acid    Imaging Reviewed:   XR Knee 1 or 2 View Right    Result Date: 3/11/2021  No radiographic evidence of acute fracture or dislocation.  Electronically Signed By-Bill Menjivar MD On:3/11/2021 7:11 AM This report was finalized on [id number] by  Bill Menjivar MD.            Assessment/Plan   ASSESSMENT  1. CML: Peripheral smear resulted in immature granulocytes and scattered blasts consistent with known CML.  Blasts less than 20%.  Previously treated with imatinib. Previously on Tasigna, but has not been taking due to insurance.   2. Leukocytosis: WBC has significantly increased from 10/16/2019.  Related to CML.    3. Anemia: Related to CML.  Obtain anemia studies.  4. Lower extremity pain: X-ray and Doppler unremarkable.  Treated per primary team.  5. History of PE: Previously on Eliquis  6. DM2: Managed per primary team      PLAN  1. CBC daily  2. Transfuse 1 unit pRBC for hemoglobin <7.5  3. Anemia studies ordered   4. BCR-ABL QUANT, LDH, uric acid ordered  5. Start hydroxyurea 1 g p.o. twice daily  6. Start allopurinol 300 mg p.o. at night to prevent tumor lysis syndrome    Electronically signed by JP Love, 03/11/21, 3:24 PM EST.     Thank you for this consult. We will be happy to follow along with you.     Patient seen and examined.  Face-to-face evaluation completed.  Note reviewed and edited.  Discussed with nurse practitioner.  Agree with assessment and plan as documented above.  Patient has been lost to follow-up since 2019.  She has been noncompliant with follow-ups.  She did have a significant response to Stasik now when administered at the last visit.  She is presents now with severe leukocytosis a few blast forms.  We will go ahead and begin hydroxyurea 1 g p.o. twice a day monitor her counts very closely.  We will also check quantitative BCR/ABL, uric acid, B12, LDH.  Request for assistance TKI in the office.  We will get financial counselor involved and look into free drug acquisition for her.      Electronically signed by Meghann Lerma MD, 03/11/21, 7:21 PM EST.

## 2021-03-12 VITALS
DIASTOLIC BLOOD PRESSURE: 77 MMHG | RESPIRATION RATE: 20 BRPM | WEIGHT: 145.5 LBS | HEIGHT: 64 IN | TEMPERATURE: 97.7 F | OXYGEN SATURATION: 98 % | BODY MASS INDEX: 24.84 KG/M2 | SYSTOLIC BLOOD PRESSURE: 132 MMHG | HEART RATE: 83 BPM

## 2021-03-12 LAB
ANION GAP SERPL CALCULATED.3IONS-SCNC: 11 MMOL/L (ref 5–15)
BUN SERPL-MCNC: 7 MG/DL (ref 6–20)
BUN/CREAT SERPL: 15.6 (ref 7–25)
CALCIUM SPEC-SCNC: 8.9 MG/DL (ref 8.6–10.5)
CHLORIDE SERPL-SCNC: 103 MMOL/L (ref 98–107)
CO2 SERPL-SCNC: 24 MMOL/L (ref 22–29)
CREAT SERPL-MCNC: 0.45 MG/DL (ref 0.57–1)
DEPRECATED RDW RBC AUTO: 54.7 FL (ref 37–54)
ERYTHROCYTE [DISTWIDTH] IN BLOOD BY AUTOMATED COUNT: 18.6 % (ref 12.3–15.4)
GFR SERPL CREATININE-BSD FRML MDRD: >150 ML/MIN/1.73
GLUCOSE BLDC GLUCOMTR-MCNC: 131 MG/DL (ref 70–105)
GLUCOSE BLDC GLUCOMTR-MCNC: 213 MG/DL (ref 70–105)
GLUCOSE SERPL-MCNC: 161 MG/DL (ref 65–99)
HCT VFR BLD AUTO: 26.7 % (ref 34–46.6)
HGB BLD-MCNC: 8.7 G/DL (ref 12–15.9)
MAGNESIUM SERPL-MCNC: 1.5 MG/DL (ref 1.6–2.6)
MCH RBC QN AUTO: 27.2 PG (ref 26.6–33)
MCHC RBC AUTO-ENTMCNC: 32.4 G/DL (ref 31.5–35.7)
MCV RBC AUTO: 83.7 FL (ref 79–97)
PLATELET # BLD AUTO: 261 10*3/MM3 (ref 140–450)
PMV BLD AUTO: 8.1 FL (ref 6–12)
POTASSIUM SERPL-SCNC: 3.3 MMOL/L (ref 3.5–5.2)
POTASSIUM SERPL-SCNC: 3.6 MMOL/L (ref 3.5–5.2)
RBC # BLD AUTO: 3.19 10*6/MM3 (ref 3.77–5.28)
SODIUM SERPL-SCNC: 138 MMOL/L (ref 136–145)
WBC # BLD AUTO: 82.4 10*3/MM3 (ref 3.4–10.8)

## 2021-03-12 PROCEDURE — 99226 PR SBSQ OBSERVATION CARE/DAY 35 MINUTES: CPT | Performed by: NURSE PRACTITIONER

## 2021-03-12 PROCEDURE — 96376 TX/PRO/DX INJ SAME DRUG ADON: CPT

## 2021-03-12 PROCEDURE — 96366 THER/PROPH/DIAG IV INF ADDON: CPT

## 2021-03-12 PROCEDURE — 80048 BASIC METABOLIC PNL TOTAL CA: CPT | Performed by: NURSE PRACTITIONER

## 2021-03-12 PROCEDURE — 83735 ASSAY OF MAGNESIUM: CPT | Performed by: NURSE PRACTITIONER

## 2021-03-12 PROCEDURE — 84132 ASSAY OF SERUM POTASSIUM: CPT | Performed by: INTERNAL MEDICINE

## 2021-03-12 PROCEDURE — 85027 COMPLETE CBC AUTOMATED: CPT | Performed by: NURSE PRACTITIONER

## 2021-03-12 PROCEDURE — G0378 HOSPITAL OBSERVATION PER HR: HCPCS

## 2021-03-12 PROCEDURE — 96365 THER/PROPH/DIAG IV INF INIT: CPT

## 2021-03-12 PROCEDURE — 25010000002 KETOROLAC TROMETHAMINE PER 15 MG: Performed by: NURSE PRACTITIONER

## 2021-03-12 PROCEDURE — 25010000002 MAGNESIUM SULFATE 2 GM/50ML SOLUTION: Performed by: NURSE PRACTITIONER

## 2021-03-12 PROCEDURE — 82962 GLUCOSE BLOOD TEST: CPT

## 2021-03-12 PROCEDURE — 99226 PR SBSQ OBSERVATION CARE/DAY 35 MINUTES: CPT | Performed by: INTERNAL MEDICINE

## 2021-03-12 PROCEDURE — 99217 PR OBSERVATION CARE DISCHARGE MANAGEMENT: CPT | Performed by: INTERNAL MEDICINE

## 2021-03-12 PROCEDURE — 63710000001 INSULIN LISPRO (HUMAN) PER 5 UNITS: Performed by: NURSE PRACTITIONER

## 2021-03-12 RX ORDER — PEN NEEDLE, DIABETIC 30 GX3/16"
1 NEEDLE, DISPOSABLE MISCELLANEOUS DAILY
Qty: 100 EACH | Refills: 2 | Status: SHIPPED | OUTPATIENT
Start: 2021-03-12 | End: 2021-09-28 | Stop reason: SDUPTHER

## 2021-03-12 RX ORDER — POTASSIUM CHLORIDE 20 MEQ/1
40 TABLET, EXTENDED RELEASE ORAL DAILY
Qty: 10 TABLET | Refills: 0 | Status: SHIPPED | OUTPATIENT
Start: 2021-03-12 | End: 2021-09-28

## 2021-03-12 RX ORDER — ONDANSETRON 4 MG/1
4 TABLET, FILM COATED ORAL EVERY 6 HOURS PRN
Qty: 39 TABLET | Refills: 0 | Status: SHIPPED | OUTPATIENT
Start: 2021-03-12 | End: 2022-01-12

## 2021-03-12 RX ORDER — ALLOPURINOL 300 MG/1
300 TABLET ORAL NIGHTLY
Qty: 30 TABLET | Refills: 0 | Status: SHIPPED | OUTPATIENT
Start: 2021-03-12 | End: 2021-09-28

## 2021-03-12 RX ORDER — GABAPENTIN 100 MG/1
300 CAPSULE ORAL 2 TIMES DAILY
Qty: 30 CAPSULE | Refills: 0 | Status: SHIPPED | OUTPATIENT
Start: 2021-03-12 | End: 2021-06-07

## 2021-03-12 RX ORDER — MAGNESIUM SULFATE HEPTAHYDRATE 40 MG/ML
2 INJECTION, SOLUTION INTRAVENOUS ONCE
Status: DISCONTINUED | OUTPATIENT
Start: 2021-03-12 | End: 2021-03-12

## 2021-03-12 RX ORDER — BLOOD SUGAR DIAGNOSTIC
1 STRIP MISCELLANEOUS
Qty: 100 EACH | Refills: 2 | Status: SHIPPED | OUTPATIENT
Start: 2021-03-12 | End: 2021-05-25 | Stop reason: SDUPTHER

## 2021-03-12 RX ORDER — FERROUS SULFATE TAB EC 324 MG (65 MG FE EQUIVALENT) 324 (65 FE) MG
324 TABLET DELAYED RESPONSE ORAL
Qty: 30 TABLET | Refills: 3 | Status: SHIPPED | OUTPATIENT
Start: 2021-03-13 | End: 2021-09-28

## 2021-03-12 RX ORDER — HYDROXYUREA 500 MG/1
1000 CAPSULE ORAL 2 TIMES DAILY
Qty: 60 CAPSULE | Refills: 0 | Status: SHIPPED | OUTPATIENT
Start: 2021-03-12 | End: 2021-09-28

## 2021-03-12 RX ORDER — FERROUS SULFATE TAB EC 324 MG (65 MG FE EQUIVALENT) 324 (65 FE) MG
324 TABLET DELAYED RESPONSE ORAL
Status: DISCONTINUED | OUTPATIENT
Start: 2021-03-12 | End: 2021-03-12 | Stop reason: HOSPADM

## 2021-03-12 RX ORDER — LANCETS
1 EACH MISCELLANEOUS
Qty: 100 EACH | Refills: 2 | Status: SHIPPED | OUTPATIENT
Start: 2021-03-12 | End: 2021-05-25 | Stop reason: SDUPTHER

## 2021-03-12 RX ADMIN — HYDROXYUREA 1000 MG: 500 CAPSULE ORAL at 09:31

## 2021-03-12 RX ADMIN — POTASSIUM CHLORIDE 40 MEQ: 1500 TABLET, EXTENDED RELEASE ORAL at 05:40

## 2021-03-12 RX ADMIN — GABAPENTIN 100 MG: 100 CAPSULE ORAL at 05:40

## 2021-03-12 RX ADMIN — Medication 10 ML: at 09:31

## 2021-03-12 RX ADMIN — FERROUS SULFATE TAB EC 324 MG (65 MG FE EQUIVALENT) 324 MG: 324 (65 FE) TABLET DELAYED RESPONSE at 12:07

## 2021-03-12 RX ADMIN — ACETAMINOPHEN 650 MG: 325 TABLET ORAL at 12:45

## 2021-03-12 RX ADMIN — GABAPENTIN 100 MG: 100 CAPSULE ORAL at 15:32

## 2021-03-12 RX ADMIN — POTASSIUM CHLORIDE 40 MEQ: 1500 TABLET, EXTENDED RELEASE ORAL at 09:31

## 2021-03-12 RX ADMIN — KETOROLAC TROMETHAMINE 30 MG: 30 INJECTION, SOLUTION INTRAMUSCULAR; INTRAVENOUS at 09:34

## 2021-03-12 RX ADMIN — INSULIN LISPRO 4 UNITS: 100 INJECTION, SOLUTION INTRAVENOUS; SUBCUTANEOUS at 12:07

## 2021-03-12 RX ADMIN — MAGNESIUM SULFATE HEPTAHYDRATE 2 G: 40 INJECTION, SOLUTION INTRAVENOUS at 05:40

## 2021-03-12 NOTE — PROGRESS NOTES
"Palliative Care Daily Progress Note     C/C: knee pain    S: Nieves Mc is a 45 y.o. female who presented to Ed on 3/11 with complaints of bilateral knee pain. Patient stated the pain started 1 week ago and is a constant shooting pain. She has used icy hot with no relief. Denied fever, chills, nausea, and vomiting. No shortness of breath or chest pain.  Per patient, she has CML and previously saw Dr. Lerma. She has not been taking her medications due to financial reasons. Dr. Lerma. She has been consulted this admission.      In the ED, x-ray right knee unremarkable.  Venous duplex left lower extremity negative.  All labs unremarkable upon admission except blood glucose 371, white blood cells 88.5, hemoglobin 8.9.  All vital signs stable upon admission except blood pressure 154/92.  Patient received 6 units insulin lispro and Toradol in the ED.     3/11 Palliative Consult for discussion on goals of care.Follow up visit for goals of care and advanced directives.     3/12 Dr. Lerma saw patient overnight and started her on Hydrea and allopurinol for management of blasts.         O: Code Status:   Code Status and Medical Interventions:   Ordered at: 03/11/21 0803     Level Of Support Discussed With:    Patient     Code Status:    CPR     Medical Interventions (Level of Support Prior to Arrest):    Full      Advanced Directives: Advance Directive Status: Patient does not have advance directive   Goals of Care: Ongoing.   Palliative Performance Scale Score:       /71 (BP Location: Left arm, Patient Position: Lying)   Pulse 76   Temp 98.1 °F (36.7 °C) (Oral)   Resp 17   Ht 162.6 cm (64\")   Wt 66 kg (145 lb 8.1 oz)   SpO2 97%   BMI 24.98 kg/m²     Intake/Output Summary (Last 24 hours) at 3/12/2021 1038  Last data filed at 3/11/2021 2000  Gross per 24 hour   Intake 380 ml   Output --   Net 380 ml         Review of Systems   Constitutional: Positive for fatigue.   Musculoskeletal: Positive for myalgias. "   Neurological: Positive for headaches.   Psychiatric/Behavioral: Positive for suicidal ideas.   All other systems reviewed and are negative.        Physical Exam  Vitals and nursing note reviewed.   HENT:      Head: Normocephalic and atraumatic.      Nose: Nose normal.      Mouth/Throat:      Mouth: Mucous membranes are moist.      Pharynx: Oropharynx is clear.   Eyes:      Extraocular Movements: Extraocular movements intact.      Conjunctiva/sclera: Conjunctivae normal.      Pupils: Pupils are equal, round, and reactive to light.   Cardiovascular:      Rate and Rhythm: Normal rate.      Pulses: Normal pulses.   Pulmonary:      Effort: Pulmonary effort is normal.   Abdominal:      General: Abdomen is flat.      Palpations: Abdomen is soft.   Musculoskeletal:         General: Normal range of motion.   Skin:     General: Skin is warm and dry.   Neurological:      General: No focal deficit present.      Mental Status: She is alert and oriented to person, place, and time. Mental status is at baseline.   Psychiatric:         Mood and Affect: Mood normal.         Behavior: Behavior normal.         Thought Content: Thought content normal.         Judgment: Judgment normal.         Labs:   Results from last 7 days   Lab Units 03/12/21  0319   WBC 10*3/mm3 82.40*   HEMOGLOBIN g/dL 8.7*   HEMATOCRIT % 26.7*   PLATELETS 10*3/mm3 261     Results from last 7 days   Lab Units 03/12/21  0319   SODIUM mmol/L 138   POTASSIUM mmol/L 3.3*   CHLORIDE mmol/L 103   CO2 mmol/L 24.0   BUN mg/dL 7   CREATININE mg/dL 0.45*   GLUCOSE mg/dL 161*   CALCIUM mg/dL 8.9     Results from last 7 days   Lab Units 03/12/21  0319 03/11/21  0540   SODIUM mmol/L 138 135*   POTASSIUM mmol/L 3.3* 3.7   CHLORIDE mmol/L 103 100   CO2 mmol/L 24.0 24.0   BUN mg/dL 7 5*   CREATININE mg/dL 0.45* 0.59   CALCIUM mg/dL 8.9 9.3   BILIRUBIN mg/dL  --  0.9   ALK PHOS U/L  --  130*   ALT (SGPT) U/L  --  11   AST (SGOT) U/L  --  17   GLUCOSE mg/dL 161* 371*          Diagnostics: Reviewed    A:   Patient Active Problem List   Diagnosis   • Leukemia not having achieved remission (CMS/HCC)   • CML (chronic myelocytic leukemia) (CMS/HCC)   • Depression   • Type 2 diabetes mellitus without complication, without long-term current use of insulin (CMS/HCC)   • Right knee pain   • Left leg pain   • Anemia   • History of pulmonary embolism   • Medically noncompliant   • Visual changes       S/Sx:  Goals of care: Met with patient to discuss goals of care. She has recently not been taking her chemotherapy for her CML due to insurance issues. Patient does want to restart chemotherapy pill if it is an option from her oncologist. We did discuss advanced directives. She has none currently in place. If her heart were to stop, she would want CPR. She is going to discuss with her family what her wishes would be in completing a living will and who she would want to designate as her health care surrogate. She completed living will and healthcare surrogate with [facility] this am. Son is to be patients healthcare surrogate.   Anemia: Secondary to CML. Anemia workup ordered.  CML: Patient started on hydrea for management of elevated WBC. Plans for follow up in office to restart Tasigna. [facility] has been advised to assist with financial assistance on medications.   Leg pain: X-ray normal, DVT negative. Patient on pain meds per primary. She states her pain is shooting down her legs and is hot and tingling. May be secondary to poorly controlled diabetes. Could potentially be related to neuropathy and may benefit from neurotin.   Depression: Speaks of thoughts of suicide in the past. Says she was working with [facility] but is no longer current. [facility] referral has been placed by  for patient. Plans for appointment next week.   DM/HxofPE: Per primary       P:   Palliative Care Team will continue to follow patient.     Staci Collier, APRN  3/12/2021  Time spent: 31  min

## 2021-03-12 NOTE — PROGRESS NOTES
Continued Stay Note  ZOHAIB Amor     Patient Name: Nieves Mc  MRN: [id number]  Today's Date: 3/12/2021    Admit Date: 3/11/2021    Discharge Plan     Row Name 03/12/21 1100       Plan    Plan  Anticipate routine home    Plan Comments  Chiquita VENTURA to arrange PCP appointment prior to d/c, see note for details. Potential discharge home today, awaiting MD rounds and hem/onc clearence.     Phone communication or documentation only - no physical contact with patient or family.      Radha Metcalf RN

## 2021-03-12 NOTE — PLAN OF CARE
Problem: Adult Inpatient Plan of Care  Goal: Absence of Hospital-Acquired Illness or Injury  Intervention: Identify and Manage Fall Risk  Recent Flowsheet Documentation  Taken 3/11/2021 2100 by Isaak Kathleen LPN  Safety Promotion/Fall Prevention:   assistive device/personal items within reach   clutter free environment maintained   lighting adjusted   mobility aid in reach   muscle strengthening facilitated   nonskid shoes/slippers when out of bed  Taken 3/11/2021 1940 by Isaak Kathleen LPN  Safety Promotion/Fall Prevention:   activity supervised   assistive device/personal items within reach   clutter free environment maintained   lighting adjusted   muscle strengthening facilitated   mobility aid in reach   nonskid shoes/slippers when out of bed   room organization consistent   safety round/check completed  Intervention: Prevent Skin Injury  Recent Flowsheet Documentation  Taken 3/11/2021 2310 by Isaak Kathleen LPN  Body Position: position changed independently  Taken 3/11/2021 2100 by Isaak Kathleen LPN  Body Position: position changed independently  Taken 3/11/2021 1940 by Isaak Kathleen LPN  Body Position: position changed independently  Goal: Optimal Comfort and Wellbeing  Intervention: Provide Person-Centered Care  Recent Flowsheet Documentation  Taken 3/11/2021 1940 by Isaak Kathleen LPN  Trust Relationship/Rapport: care explained     Problem: Coping Ineffective (Oncology Care)  Goal: Effective Coping  Intervention: Support and Enhance Coping Strategies  Recent Flowsheet Documentation  Taken 3/11/2021 1940 by Isaak Kathleen LPN  Family/Support System Care: self-care encouraged     Problem: Fatigue (Oncology Care)  Goal: Improved Activity Tolerance  Intervention: Promote Energy Conservation  Recent Flowsheet Documentation  Taken 3/11/2021 2310 by Isaak Kathleen LPN  Sleep/Rest Enhancement: awakenings minimized  Taken 3/11/2021 2100 by Isaak Kathleen  LPN  Activity Management: activity adjusted per tolerance  Sleep/Rest Enhancement:   awakenings minimized   room darkened   relaxation techniques promoted   regular sleep/rest pattern promoted   natural light exposure provided  Taken 3/11/2021 1940 by Isaak Kathleen LPN  Activity Management:   activity encouraged   ambulated to bathroom   ambulated in room   up ad alessandro  Sleep/Rest Enhancement:   awakenings minimized   room darkened   relaxation techniques promoted   regular sleep/rest pattern promoted   noise level reduced   natural light exposure provided     Problem: Pain Acute (Oncology Care)  Goal: Optimal Pain Control  Intervention: Develop Pain Management Plan  Recent Flowsheet Documentation  Taken 3/11/2021 2348 by Isaak Kathleen LPN  Pain Management Interventions:   see MAR   quiet environment facilitated   position adjusted   pain management plan reviewed with patient/caregiver  Intervention: Prevent or Manage Pain  Recent Flowsheet Documentation  Taken 3/11/2021 2310 by Isaak Kathleen LPN  Sleep/Rest Enhancement: awakenings minimized  Taken 3/11/2021 2100 by Isaak Kathleen LPN  Sleep/Rest Enhancement:   awakenings minimized   room darkened   relaxation techniques promoted   regular sleep/rest pattern promoted   natural light exposure provided  Taken 3/11/2021 1940 by Isaak Kathleen LPN  Sleep/Rest Enhancement:   awakenings minimized   room darkened   relaxation techniques promoted   regular sleep/rest pattern promoted   noise level reduced   natural light exposure provided  Intervention: Optimize Psychosocial Wellbeing  Recent Flowsheet Documentation  Taken 3/11/2021 1940 by Isaak Kathleen LPN  Diversional Activities:   television   smartphone     Problem: Asthma Comorbidity  Goal: Maintenance of Asthma Control  Intervention: Maintain Asthma Symptom Control  Recent Flowsheet Documentation  Taken 3/11/2021 2100 by Isaak Kathleen LPN  Medication Review/Management:  medications reviewed  Taken 3/11/2021 1940 by Isaak Kathleen LPN  Medication Review/Management: medications reviewed     Problem: COPD Comorbidity  Goal: Maintenance of COPD Symptom Control  Intervention: Maintain COPD-Symptom Control  Recent Flowsheet Documentation  Taken 3/11/2021 2100 by Isaak Kathleen LPN  Medication Review/Management: medications reviewed  Taken 3/11/2021 1940 by Isaak Kathleen LPN  Medication Review/Management: medications reviewed     Problem: Diabetes Comorbidity  Goal: Blood Glucose Level Within Desired Range  Intervention: Maintain Glycemic Control  Recent Flowsheet Documentation  Taken 3/11/2021 2100 by Isaak Kathleen LPN  Glycemic Management: blood glucose monitoring  Taken 3/11/2021 1940 by Isaak Kathleen LPN  Glycemic Management: blood glucose monitoring     Problem: Heart Failure Comorbidity  Goal: Maintenance of Heart Failure Symptom Control  Intervention: Maintain Heart Failure-Management Strategies  Recent Flowsheet Documentation  Taken 3/11/2021 2100 by Isaak Kathleen LPN  Medication Review/Management: medications reviewed  Taken 3/11/2021 1940 by Isaak Kathleen LPN  Medication Review/Management: medications reviewed     Problem: Hypertension Comorbidity  Goal: Blood Pressure in Desired Range  Intervention: Maintain Hypertension-Management Strategies  Recent Flowsheet Documentation  Taken 3/11/2021 2100 by Isaak Kathleen LPN  Medication Review/Management: medications reviewed  Taken 3/11/2021 1940 by Isaak Kathleen LPN  Medication Review/Management: medications reviewed     Problem: Hypertension Comorbidity  Goal: Blood Pressure in Desired Range  Intervention: Maintain Hypertension-Management Strategies  Recent Flowsheet Documentation  Taken 3/11/2021 2100 by Isaak Kathleen LPN  Medication Review/Management: medications reviewed  Taken 3/11/2021 1940 by Isaak Kathleen LPN  Medication Review/Management: medications  reviewed   Goal Outcome Evaluation:

## 2021-03-12 NOTE — DISCHARGE SUMMARY
[facility] Medicine Services  DISCHARGE SUMMARY        Prepared For PCP:  Provider, No Known    Patient Name: Nieves Mc  : 1975  MRN: [id number]      Date of Admission:   3/11/2021    Date of Discharge:  3/12/2021    Length of stay:  LOS: 0 days     Hospital Course     Presenting Problem:   Left leg pain [M79.605]  Acute pain of right knee [M25.561]  Uncontrolled type 2 diabetes mellitus with hyperglycemia (CMS/HCC) [E11.65]  Leukemia not having achieved remission, unspecified leukemia type (CMS/HCC) [C95.90]      Active Hospital Problems    Diagnosis  POA   • **Leukemia not having achieved remission (CMS/HCC) [C95.90]  Yes   • Right knee pain [M25.561]  Yes   • Left leg pain [M79.605]  Yes   • Anemia [D64.9]  Yes   • History of pulmonary embolism [Z86.711]  Yes   • Medically noncompliant [Z91.19]  Not Applicable   • Visual changes [H53.9]  Yes   • Type 2 diabetes mellitus without complication, without long-term current use of insulin (CMS/HCC) [E11.9]  Yes   • CML (chronic myelocytic leukemia) (CMS/HCC) [C92.10]  Yes      Resolved Hospital Problems   No resolved problems to display.     Chronic Leukemia  - WBC 88.5, monitor   - Previously on nilotinib, has not been taking due to insurance    - Hematology/oncology consulted      Acute anemia  - Hbg 8.9, monitor  - Type and screen  - Will transfuse if hbg < 7.0   - Anemia studies ordered      Right knee pain   - x-ray right knee unremarkable.    - Received toradol in ED  - Pain medication ordered      LLE pain   - Venous duplex left lower extremity unremarkable   - Neurovascular checks, good pedal pulses noted on exam   - Fall risk precautions      Acute visual disturbances  - likely secondary to uncontrolled diabetes  - Encourage outpatient follow-up with opthalmologist     Uncontrolled Type II DM  -  upon admission, received 6 units insulin lispro   - Start sliding scale, Accuchecks ACHS   - Start Lantus 20 units nightly    - hbg A1C-11  - Diabetes educator consulted  - Noncompliance on medication      History of PE  - Previously on eliquis      Noncompliance with medications  - Patient reports due to her insurance   - Consult         Hospital Course:  Nieves Mc is a 45 y.o. female with a past medical history of chronic myelocytic leukemia, PE, and type 2 diabetes mellitus who presented to [facility] on 3/11/2021 with bilateral leg pain.  Patient explains that this started 1 week ago.  She reports her right knee has had a shooting constant pain to the point that she was unable to stand on it.  Her left leg has had a sharp pain going through real quick.  She has tried icy hot and Aspercreme at home with no relief.  Pain medication given in the ED has helped.  Walking on her legs makes the pain worse.  Her current pain is a 5 out of 10.  She reports her pain has gotten so bad she has had nausea.  She also reports that she has a knot in the middle of her sternum that went away and came back.  She has a history of CML and has not been on treatment due to insurance.  She last saw Dr. Lerma 7 months ago.  She also reports she was previously on Eliquis for PE, but no longer takes this.  She denies any recent bloody stools, hematemesis, vomiting, diarrhea, fever, chills, cough, shortness breath, or chest pain.  She also explains she has been noncompliant on her diabetes medication because she has not been able to afford them.        In the ED, x-ray right knee unremarkable.  Venous duplex left lower extremity pending.  All labs unremarkable upon admission except blood glucose 371, white blood cells 88.5, hemoglobin 8.9.  All vital signs stable upon admission except blood pressure 154/92.  Patient received 6 units insulin lispro and Toradol in the ED.     3/12/21 patient doing better today, will discharge patient home, to follow-up with oncology next week.  Continue meds per MAR.    Recommendation for Outpatient  Providers:   Follow-up with oncology neck schedule appointment    Reasons For Change In Medications and Indications for New Medications:    Allopurinol, Neurontin, hydroxyurea, Lantus insulin  Day of Discharge       Vital Signs:   Temp:  [98.1 °F (36.7 °C)-98.3 °F (36.8 °C)] 98.1 °F (36.7 °C)  Heart Rate:  [76-78] 76  Resp:  [17] 17  BP: (128-149)/(71-88) 128/71       Physical Exam  Vitals and nursing note reviewed.   Constitutional:       General: She is not in acute distress.     Appearance: Normal appearance. She is well-developed. She is not ill-appearing, toxic-appearing or diaphoretic.   HENT:      Head: Normocephalic and atraumatic.      Nose: Nose normal. No congestion or rhinorrhea.      Mouth/Throat:      Mouth: Mucous membranes are moist.      Pharynx: No oropharyngeal exudate.   Eyes:      General: No scleral icterus.        Right eye: No discharge.         Left eye: No discharge.      Extraocular Movements: Extraocular movements intact.      Conjunctiva/sclera: Conjunctivae normal.      Pupils: Pupils are equal, round, and reactive to light.   Neck:      Thyroid: No thyromegaly.      Vascular: No carotid bruit or JVD.      Trachea: No tracheal deviation.   Cardiovascular:      Rate and Rhythm: Normal rate and regular rhythm.      Pulses: Normal pulses.      Heart sounds: Normal heart sounds. No murmur. No friction rub. No gallop.    Pulmonary:      Effort: Pulmonary effort is normal. No respiratory distress.      Breath sounds: Normal breath sounds. No stridor. No wheezing, rhonchi or rales.   Chest:      Chest wall: No tenderness.   Abdominal:      General: Bowel sounds are normal. There is no distension.      Palpations: Abdomen is soft. There is no mass.      Tenderness: There is no abdominal tenderness. There is no guarding or rebound.      Hernia: No hernia is present.   Musculoskeletal:         General: No swelling, tenderness, deformity or signs of injury. Normal range of motion.      Cervical  back: Normal range of motion and neck supple. No rigidity. No muscular tenderness.      Right lower leg: No edema.      Left lower leg: No edema.   Lymphadenopathy:      Cervical: No cervical adenopathy.   Skin:     General: Skin is warm and dry.      Coloration: Skin is not jaundiced or pale.      Findings: No bruising, erythema or rash.   Neurological:      General: No focal deficit present.      Mental Status: She is alert and oriented to person, place, and time. Mental status is at baseline.      Cranial Nerves: No cranial nerve deficit.      Sensory: No sensory deficit.      Motor: No weakness or abnormal muscle tone.      Coordination: Coordination normal.   Psychiatric:         Mood and Affect: Mood normal.         Behavior: Behavior normal.         Thought Content: Thought content normal.         Judgment: Judgment normal.         Pertinent  and/or Most Recent Results     Results from last 7 days   Lab Units 03/12/21  1318 03/12/21  0319 03/11/21  0540   WBC 10*3/mm3  --  82.40* 88.50*   HEMOGLOBIN g/dL  --  8.7* 8.9*   HEMATOCRIT %  --  26.7* 28.8*   PLATELETS 10*3/mm3  --  261 273   SODIUM mmol/L  --  138 135*   POTASSIUM mmol/L 3.6 3.3* 3.7   CHLORIDE mmol/L  --  103 100   CO2 mmol/L  --  24.0 24.0   BUN mg/dL  --  7 5*   CREATININE mg/dL  --  0.45* 0.59   GLUCOSE mg/dL  --  161* 371*   CALCIUM mg/dL  --  8.9 9.3     Results from last 7 days   Lab Units 03/11/21  0540   BILIRUBIN mg/dL 0.9   ALK PHOS U/L 130*   ALT (SGPT) U/L 11   AST (SGOT) U/L 17           Invalid input(s): TG, LDLCALC, LDLREALC  Results from last 7 days   Lab Units 03/11/21  0540   HEMOGLOBIN A1C % 11.4*       Brief Urine Lab Results     None          Microbiology Results Abnormal     Procedure Component Value - Date/Time    COVID PRE-OP / PRE-PROCEDURE SCREENING ORDER (NO ISOLATION) - Swab, Nasopharynx [[id number]]  (Normal) Collected: 03/11/21 0731    Lab Status: Final result Specimen: Swab from Nasopharynx Updated: 03/11/21 1044     Narrative:      The following orders were created for panel order COVID PRE-OP / PRE-PROCEDURE SCREENING ORDER (NO ISOLATION) - Swab, Nasopharynx.  Procedure                               Abnormality         Status                     ---------                               -----------         ------                     COVID-19,APTIMA PANTHER,...[[id number]]  Normal              Final result                 Please view results for these tests on the individual orders.    COVID-19,APTIMA PANTHER,[facility] IN-HOUSE, NP/OP SWAB IN UTM/VTM/SALINE TRANSPORT MEDIA,24 HR TAT - Swab, Nasopharynx [[id number]]  (Normal) Collected: 03/11/21 0731    Lab Status: Final result Specimen: Swab from Nasopharynx Updated: 03/11/21 1443     COVID19 Not Detected    Narrative:      Fact sheet for providers: https://www.fda.gov/media/355213/download     Fact sheet for patients: https://www.fda.gov/media/728704/download    Test performed by RT PCR.          XR Shoulder 2+ View Left    Result Date: 2/22/2021  Impression: Negative for acute osseous abnormality.  Electronically Signed By-Zac Turcios MD On:2/22/2021 7:12 AM This report was finalized on [id number] by  Zac Turcios MD.    XR Knee 1 or 2 View Right    Result Date: 3/11/2021  Impression: No radiographic evidence of acute fracture or dislocation.  Electronically Signed By-Bill Menjivar MD On:3/11/2021 7:11 AM This report was finalized on [id number] by  Bill Menjivar MD.      Results for orders placed during the hospital encounter of 03/11/21    Duplex Venous Lower Extremity - Left    Interpretation Summary  · Normal left lower extremity venous duplex scan.      Results for orders placed during the hospital encounter of 03/11/21    Duplex Venous Lower Extremity - Left    Interpretation Summary  · Normal left lower extremity venous duplex scan.                  Test Results Pending at Discharge  Pending Labs     Order Current Status    BCR-ABL1, CML / ALL, PCR, Quant In process             Procedures Performed           Consults:   Consults     Date and Time Order Name Status Description    3/11/2021  7:53 AM Hematology & Oncology Inpatient Consult          ASSESSMENT:  1. CML: Peripheral smear resulted in immature granulocytes and scattered blasts consistent with known CML.  Blasts less than 20%.  LDH 1187.  Uric acid normal. Previously treated with imatinib. Previously on Tasigna, but has not been taking due to insurance. Request for assistance TKI in the office.  We will get financial counselor involved and look into free drug acquisition for her.  2. Leukocytosis: WBC has significantly increased from 10/16/2019.  Related to CML.    3. Anemia: Related to CML.    Anemia studies: Reticulocytes 6.42%, haptoglobin 75, folate 5.32, vitamin B12 >2000, ferritin 58.23, iron 43, iron saturation 11%, transferrin 271, TIBC 404.  4. Lower extremity pain: X-ray and Doppler unremarkable.  Treated per primary team.  5. History of PE: Previously on Eliquis  6. DM2: Managed per primary team        PLAN  1. CBC daily  2. Transfuse 1 unit pRBC for hemoglobin <7.5  3. Star oral iron daily for iron deficiency anemia  4. BCR-ABL QUANT pending  5. Continue hydroxyurea 1 g p.o. twice daily  6. Continue allopurinol 300 mg p.o. at night to prevent tumor lysis syndrome  7. If discharged today, follow-up with Dr. Lerma in 1 to 2 weeks for follow-up appointment.  We will plan to restart patient on TKI and [facility]     Electronically signed by JP Love, 03/12/21, 11:22 AM EST.        Discharge Details        Discharge Medications      New Medications      Instructions Start Date   Accu-Chek Guide test strip  Generic drug: glucose blood   1 each, Other, 3 Times Daily Before Meals, Dx: E11.65. Use as instructed      Accu-Chek Softclix Lancets lancets   1 each, Other, 3 Times Daily Before Meals, Dx: E11.65. Use as instructed      allopurinol 300 MG  tablet  Commonly known as: ZYLOPRIM   300 mg, Oral, Nightly      ferrous sulfate 324 (65 Fe) MG tablet delayed-release EC tablet   324 mg, Oral, Daily With Breakfast   Start Date: March 13, 2021     gabapentin 100 MG capsule  Commonly known as: NEURONTIN   300 mg, Oral, 2 times daily      hydroxyurea 500 MG capsule  Commonly known as: HYDREA   1,000 mg, Oral, 2 Times Daily      Insulin Glargine 100 UNIT/ML injection pen  Commonly known as: LANTUS SOLOSTAR   20 Units, Subcutaneous, Nightly      ondansetron 4 MG tablet  Commonly known as: ZOFRAN   4 mg, Oral, Every 6 Hours PRN      Pen Needles 32G X 4 MM misc   1 each, Does not apply, Daily      potassium chloride 20 MEQ CR tablet  Commonly known as: K-DUR,KLOR-CON   40 mEq, Oral, Daily             Allergies   Allergen Reactions   • Hydromorphone GI Intolerance   • Morphine Nausea And Vomiting   • Propofol Hives         Discharge Disposition:  Home or Self Care    Diet:  Hospital:  Diet Order   Procedures   • Diet Diabetic/Consistent Carbs; Diabetic - Consistent Carb         Discharge Activity:         CODE STATUS:    Code Status and Medical Interventions:   Ordered at: 03/11/21 0803     Level Of Support Discussed With:    Patient     Code Status:    CPR     Medical Interventions (Level of Support Prior to Arrest):    Full         Follow-up Appointments  No future appointments.          Condition on Discharge:      Stable      This patient has been examined wearing appropriate Personal Protective Equipment and discussed with rn. 03/12/21      Electronically signed by Aristeo Noel MD, 03/12/21, 2:03 PM EST.      Time: I spent  34  minutes on this discharge activity which included face-to-face encounter with the patient/reviewing the data in the system/coordination of the care with the nursing staff as well as consultants/documentation/entering orders.

## 2021-03-12 NOTE — PROGRESS NOTES
Continued Stay Note   Mt     Patient Name: Nieves Mc  MRN: [id number]  Today's Date: 3/12/2021    Admit Date: 3/11/2021    Discharge Plan     Row Name 03/12/21 1050       Plan    Plan  Anticipate routine home    Plan Comments  SW met with pt at the bedside to provide updates, additional resources (for [employer]), and ask regarding location of [facility] for which she needed an appointment. Pt accepted all resources provided. Pt reported that she had previously gone to [facility] on [address] and saw Albino for therapy. SW called [facility] and made initial paperwork appointment for Monday, 3/15, at 10am and added to AVS. SW met with pt at bedside again to update regarding date and time of [facility] appointment. Pt is agreeable to this date and time.        Met with patient in room wearing PPE: mask, face shield/goggles.      Maintained distance greater than six feet and spent less than 15 minutes in the room.      Chiquita Sadler, [facility] TalihinaJACQUELIN, LSW    Office: [phone]  Cell: [phone]  Fax: [phone]  E-mail: [email or web address]

## 2021-03-12 NOTE — PROGRESS NOTES
Hematology/Oncology Inpatient Progress Note    PATIENT NAME: Nieves Mc  : 1975  MRN: [id number]    CHIEF COMPLAINT: Bilateral leg pain    HISTORY OF PRESENT ILLNESS:    Nieves Mc is a 45 y.o. female who presented to [facility] on 3/11/2021 with complaints of bilateral leg pain that started 1 week ago.  She complained of a shooting constant pain to her right knee and sharp pain in her left leg.  X-ray of her right knee in the ED showed no evidence of acute fracture or dislocation.  Bilateral lower extremity Doppler was normal.  Labs in the ED were significant for WBC 88.50, hemoglobin 8.9, glucose 371, sodium 135, alkaline phosphatase 130.  Peripheral smear resulted marked leukocytosis with immature granulocytes and scattered blasts consistent with known CML.  Blasts less than 20%.  Patient was admitted for further evaluation management.     21  Hematology/Oncology was consulted as patient is known to our service and followed by Dr. Meghann Lerma for CML.  Patient has been treated with imatinib in the past.  In 2019 patient was started on to Tasigna.  Tasigna was briefly stopped due to QT prolongation, but then restarted.  Patient was last seen for a follow-up appointment on 2019 per [facility] with plans to follow-up 3 weeks later.    Patient has been noncompliant with follow-ups.     She  has a past medical history of Diabetes mellitus (CMS/HCC), Leukemia (CMS/HCC) (), and Pulmonary embolism (CMS/HCC).     PCP: Provider, No Known    History of present illness was reviewed and is unchanged from the previous visit. 21     Subjective   Doing well.  Wants to go home.  ROS:  Review of Systems   Constitutional: Negative for chills, fatigue and fever.   HENT: Negative for mouth sores and nosebleeds.    Eyes: Negative for photophobia and visual disturbance.   Respiratory: Negative for cough and shortness of breath.    Cardiovascular: Negative for chest pain and leg  "swelling.   Gastrointestinal: Negative for diarrhea, nausea and vomiting.   Endocrine: Negative for cold intolerance and heat intolerance.   Genitourinary: Negative for enuresis, flank pain and hematuria.   Musculoskeletal: Positive for myalgias.   Skin: Negative for rash and wound.   Neurological: Negative for dizziness, light-headedness and headaches.   Psychiatric/Behavioral: Negative for confusion. The patient is not nervous/anxious.         MEDICATIONS:    Scheduled Meds:  allopurinol, 300 mg, Oral, Nightly  gabapentin, 100 mg, Oral, Q8H  hydroxyurea, 1,000 mg, Oral, BID  insulin glargine, 20 Units, Subcutaneous, Nightly  insulin lispro, 0-9 Units, Subcutaneous, TID AC  sodium chloride, 10 mL, Intravenous, Q12H       Continuous Infusions:      PRN Meds:  •  acetaminophen **OR** acetaminophen **OR** acetaminophen  •  dextrose  •  dextrose  •  glucagon (human recombinant)  •  insulin lispro **AND** insulin lispro  •  ketamine (KETALAR) IVPB **AND** Ketamine Vital Signs & Assessment  •  ketorolac  •  magnesium sulfate **OR** magnesium sulfate in D5W 1g/100mL (PREMIX)  •  ondansetron **OR** ondansetron  •  potassium chloride  •  [COMPLETED] Insert peripheral IV **AND** sodium chloride  •  sodium chloride     ALLERGIES:    Allergies   Allergen Reactions   • Hydromorphone GI Intolerance   • Morphine Nausea And Vomiting   • Propofol Hives       Objective    VITALS:   /71 (BP Location: Left arm, Patient Position: Lying)   Pulse 76   Temp 98.1 °F (36.7 °C) (Oral)   Resp 17   Ht 162.6 cm (64\")   Wt 66 kg (145 lb 8.1 oz)   SpO2 97%   BMI 24.98 kg/m²     PHYSICAL EXAM: (performed by MD)  Physical Exam  Constitutional:       General: She is not in acute distress.     Appearance: Normal appearance. She is not toxic-appearing.   HENT:      Head: Normocephalic and atraumatic.   Eyes:      General: No scleral icterus.        Right eye: No discharge.         Left eye: No discharge.      Extraocular Movements: " Extraocular movements intact.   Cardiovascular:      Rate and Rhythm: Normal rate.      Heart sounds: Normal heart sounds.   Pulmonary:      Effort: Pulmonary effort is normal. No respiratory distress.   Abdominal:      General: There is no distension.      Palpations: Abdomen is soft.      Tenderness: There is no abdominal tenderness.   Musculoskeletal:         General: Normal range of motion.      Cervical back: Normal range of motion.   Skin:     General: Skin is warm.      Comments: Tattoos noted on the skin.   Neurological:      General: No focal deficit present.      Mental Status: She is alert and oriented to person, place, and time.   Psychiatric:         Mood and Affect: Mood normal.         Behavior: Behavior normal.           RECENT LABS:  Lab Results (last 24 hours)     Procedure Component Value Units Date/Time    POC Glucose Once [[id number]]  (Abnormal) Collected: 03/12/21 0712    Specimen: Blood Updated: 03/12/21 0714     Glucose 131 mg/dL      Comment: Serial Number: [id number]:  [id number]       Basic Metabolic Panel [[id number]]  (Abnormal) Collected: 03/12/21 0319    Specimen: Blood Updated: 03/12/21 0434     Glucose 161 mg/dL      BUN 7 mg/dL      Creatinine 0.45 mg/dL      Sodium 138 mmol/L      Potassium 3.3 mmol/L      Chloride 103 mmol/L      CO2 24.0 mmol/L      Calcium 8.9 mg/dL      eGFR Non African Amer >150 mL/min/1.73      BUN/Creatinine Ratio 15.6     Anion Gap 11.0 mmol/L     Narrative:      GFR Normal >60  Chronic Kidney Disease <60  Kidney Failure <15      Magnesium [[id number]]  (Abnormal) Collected: 03/12/21 0319    Specimen: Blood Updated: 03/12/21 0434     Magnesium 1.5 mg/dL     CBC (No Diff) [[id number]]  (Abnormal) Collected: 03/12/21 0319    Specimen: Blood Updated: 03/12/21 0422     WBC 82.40 10*3/mm3      RBC 3.19 10*6/mm3      Hemoglobin 8.7 g/dL      Hematocrit 26.7 %      MCV 83.7 fL      MCH 27.2 pg      MCHC 32.4 g/dL      RDW 18.6 %      RDW-SD 54.7 fl      MPV 8.1  fL      Platelets 261 10*3/mm3     POC Glucose Once [[id number]]  (Abnormal) Collected: 03/11/21 2004    Specimen: Blood Updated: 03/11/21 2005     Glucose 178 mg/dL      Comment: Serial Number: [id number]:  [id number]       POC Glucose Once [[id number]]  (Abnormal) Collected: 03/11/21 1642    Specimen: Blood Updated: 03/11/21 1648     Glucose 219 mg/dL      Comment: Serial Number: [id number]:  [id number]       Folate [[id number]]  (Normal) Collected: 03/11/21 0540    Specimen: Blood Updated: 03/11/21 1644     Folate 5.32 ng/mL     Narrative:      Results may be falsely increased if patient taking Biotin.      Vitamin B12 [[id number]]  (Abnormal) Collected: 03/11/21 0540    Specimen: Blood Updated: 03/11/21 1644     Vitamin B-12 >2,000 pg/mL     Narrative:      Results may be falsely increased if patient taking Biotin.      Uric Acid [[id number]]  (Normal) Collected: 03/11/21 1212    Specimen: Blood Updated: 03/11/21 1636     Uric Acid 5.4 mg/dL     Haptoglobin [[id number]]  (Normal) Collected: 03/11/21 1212    Specimen: Blood Updated: 03/11/21 1631     Haptoglobin 75 mg/dL         PENDING RESULTS:BCR-ABL    IMAGING REVIEWED:  XR Knee 1 or 2 View Right    Result Date: 3/11/2021  No radiographic evidence of acute fracture or dislocation.  Electronically Signed By-Bill Menjivar MD On:3/11/2021 7:11 AM This report was finalized on [id number] by  Bill Menjivar MD.      Assessment/Plan   ASSESSMENT:  1. CML: Peripheral smear resulted in immature granulocytes and scattered blasts consistent with known CML.  Blasts less than 20%.  LDH 1187.  Uric acid normal. Previously treated with imatinib. Previously on Tasigna, but has not been taking due to insurance. Request for assistance TKI in the office.  We will get financial counselor involved and look into free drug acquisition for her.  2. Leukocytosis: WBC has significantly increased from 10/16/2019.  Related to CML.    3. Anemia: Related to CML.    Anemia studies:  Reticulocytes 6.42%, haptoglobin 75, folate 5.32, vitamin B12 >2000, ferritin 58.23, iron 43, iron saturation 11%, transferrin 271, TIBC 404.  4. Lower extremity pain: X-ray and Doppler unremarkable.  Treated per primary team.  5. History of PE: Previously on Eliquis  6. DM2: Managed per primary team        PLAN  1. CBC daily  2. Transfuse 1 unit pRBC for hemoglobin <7.5  3. Star oral iron daily for iron deficiency anemia  4. BCR-ABL QUANT pending  5. Continue hydroxyurea 1 g p.o. twice daily  6. Continue allopurinol 300 mg p.o. at night to prevent tumor lysis syndrome  7. If discharged today, follow-up with Dr. Lerma in 1 to 2 weeks for follow-up appointment.  We will plan to restart patient on TKI and [facility].    Electronically signed by JP Love, 03/12/21, 11:22 AM EST.               I personally reviewed all pertinent labs, related documentation and the  imaging. ROS performed and physical exam done during face to face enounter with patient. I agree with  nurse practitioner's doumentation, assessment and plan.    Patient with CML who has not been on any treatment for a long time presented back with severe leukocytosis.  She had not been taking TKI Tasigna due to lack of prescription insurance.  She is admitted and is now on hydroxyurea.  Counts are stable.  Okay to release today.  Continue allopurinol.  Advised to come back and see us in office on Monday for another CBC.  We will try to provide her Tasigna samples in our office.  We also have financial counselor look into getting her the free drug.        Electronically signed by Evangelist Perera MD, 03/12/21, 7:19 PM EST.

## 2021-03-12 NOTE — PROGRESS NOTES
Continued Stay Note  ZOHAIB Amor     Patient Name: Nieves Mc  MRN: [id number]  Today's Date: 3/12/2021    Admit Date: 3/11/2021    Discharge Plan     Row Name 03/12/21 1601       Plan    Plan Comments  Discharge order noted    Final Discharge Disposition Code  01 - home or self-care    Final Note  Home            Radha Metcalf RN

## 2021-03-12 NOTE — PLAN OF CARE
Problem: Diabetes Comorbidity  Goal: Blood Glucose Level Within Desired Range  Intervention: Maintain Glycemic Control  Recent Flowsheet Documentation  Taken 3/11/2021 2100 by Isaak Kathleen LPN  Glycemic Management: blood glucose monitoring  Taken 3/11/2021 1940 by Isaak Kathleen LPN  Glycemic Management: blood glucose monitoring     Problem: Heart Failure Comorbidity  Goal: Maintenance of Heart Failure Symptom Control  Intervention: Maintain Heart Failure-Management Strategies  Recent Flowsheet Documentation  Taken 3/11/2021 2100 by Isaak Kathleen LPN  Medication Review/Management: medications reviewed  Taken 3/11/2021 1940 by Isaak Kathleen LPN  Medication Review/Management: medications reviewed     Problem: Hypertension Comorbidity  Goal: Blood Pressure in Desired Range  Intervention: Maintain Hypertension-Management Strategies  Recent Flowsheet Documentation  Taken 3/11/2021 2100 by Isaak Kathleen LPN  Medication Review/Management: medications reviewed  Taken 3/11/2021 1940 by Isaak Kathleen LPN  Medication Review/Management: medications reviewed     Problem: Pain Chronic (Persistent) (Comorbidity Management)  Goal: Acceptable Pain Control and Functional Ability  Intervention: Develop Pain Management Plan  Recent Flowsheet Documentation  Taken 3/11/2021 2348 by Isaak Kathleen LPN  Pain Management Interventions:   see MAR   quiet environment facilitated   position adjusted   pain management plan reviewed with patient/caregiver  Intervention: Manage Persistent Pain  Recent Flowsheet Documentation  Taken 3/11/2021 2310 by Isaak Kathleen LPN  Sleep/Rest Enhancement: awakenings minimized  Taken 3/11/2021 2100 by Isaak Kathleen LPN  Sleep/Rest Enhancement:   awakenings minimized   room darkened   relaxation techniques promoted   regular sleep/rest pattern promoted   natural light exposure provided  Medication Review/Management: medications reviewed  Taken  3/11/2021 1940 by Isaak Kathleen LPN  Sleep/Rest Enhancement:   awakenings minimized   room darkened   relaxation techniques promoted   regular sleep/rest pattern promoted   noise level reduced   natural light exposure provided  Medication Review/Management: medications reviewed  Intervention: Optimize Psychosocial Wellbeing  Recent Flowsheet Documentation  Taken 3/11/2021 1940 by Isaak Kathleen LPN  Diversional Activities:   television   smartphone  Family/Support System Care: self-care encouraged     Problem: Pain Acute  Goal: Optimal Pain Control  Intervention: Develop Pain Management Plan  Recent Flowsheet Documentation  Taken 3/11/2021 2348 by Isaak Kathleen LPN  Pain Management Interventions:   see MAR   quiet environment facilitated   position adjusted   pain management plan reviewed with patient/caregiver  Intervention: Prevent or Manage Pain  Recent Flowsheet Documentation  Taken 3/11/2021 2310 by Isaak Kathleen LPN  Sleep/Rest Enhancement: awakenings minimized  Taken 3/11/2021 2100 by Isaak Kathleen LPN  Sleep/Rest Enhancement:   awakenings minimized   room darkened   relaxation techniques promoted   regular sleep/rest pattern promoted   natural light exposure provided  Taken 3/11/2021 1940 by Isaak Kathleen LPN  Sleep/Rest Enhancement:   awakenings minimized   room darkened   relaxation techniques promoted   regular sleep/rest pattern promoted   noise level reduced   natural light exposure provided  Intervention: Optimize Psychosocial Wellbeing  Recent Flowsheet Documentation  Taken 3/11/2021 1940 by Isaak Kathleen LPN  Diversional Activities:   television   smartphone   Goal Outcome Evaluation:

## 2021-03-15 ENCOUNTER — TELEPHONE (OUTPATIENT)
Dept: DIABETES SERVICES | Facility: HOSPITAL | Age: 46
End: 2021-03-15

## 2021-03-15 ENCOUNTER — TELEPHONE (OUTPATIENT)
Dept: ONCOLOGY | Facility: CLINIC | Age: 46
End: 2021-03-15

## 2021-03-15 DIAGNOSIS — C91.10 CLL (CHRONIC LYMPHOCYTIC LEUKEMIA) (HCC): Primary | ICD-10-CM

## 2021-03-15 NOTE — TELEPHONE ENCOUNTER
Pt discharged on 3/12/2021. Pt states she received lantus pen, pen needles, test strips and lancets for [facility] prior to being discharged from hospital. Pt states she is checking bs and taking insulin as prescribed. Discussed importance of follow up with MD. Pt verbalized understanding and has no questions for educator at this time.

## 2021-03-15 NOTE — TELEPHONE ENCOUNTER
Attempted to call pt because she is supposed to  Tasigna refills today per Dr. Perera. Dr. Lerma would like for her to come to see her in the office and get blood work done. She would also like Jennifer the  to be involved due to a history of non-compliance. Pt did not answer and VM was full.

## 2021-03-17 LAB — REF LAB TEST METHOD: NORMAL

## 2021-03-22 ENCOUNTER — TELEPHONE (OUTPATIENT)
Dept: ONCOLOGY | Facility: CLINIC | Age: 46
End: 2021-03-22

## 2021-03-22 NOTE — TELEPHONE ENCOUNTER
----- Message from JP Anaya sent at 3/19/2021  4:46 PM EDT -----  Regarding: Hospital follow-up  Patient was discharged from the hospital 3/12/2021. Patient needs to be schedule for follow-up appointment with Dr. Lerma next week.     Electronically signed by JP Love, 03/19/21, 4:48 PM EDT.

## 2021-03-23 NOTE — TELEPHONE ENCOUNTER
Patient returning my call.  Patient scheduled to see Dr. Lerma on 3- at 2:45PM.  Patient v/u..  She stated there was an issue with her insurance and did not know if she had coverage.  She states Austen, Financial Counselor was checking on this.  Spoke with Austen Pozo and he states he will contact patient.

## 2021-04-07 ENCOUNTER — HOSPITAL ENCOUNTER (EMERGENCY)
Facility: HOSPITAL | Age: 46
Discharge: LEFT BEFORE DISCHARGE | End: 2021-04-07

## 2021-04-07 VITALS
HEART RATE: 92 BPM | OXYGEN SATURATION: 98 % | SYSTOLIC BLOOD PRESSURE: 149 MMHG | TEMPERATURE: 98.5 F | DIASTOLIC BLOOD PRESSURE: 85 MMHG | RESPIRATION RATE: 17 BRPM

## 2021-04-07 LAB
ALBUMIN SERPL-MCNC: 4 G/DL (ref 3.5–5.2)
ALBUMIN/GLOB SERPL: 1.5 G/DL
ALP SERPL-CCNC: 79 U/L (ref 39–117)
ALT SERPL W P-5'-P-CCNC: 10 U/L (ref 1–33)
ANION GAP SERPL CALCULATED.3IONS-SCNC: 10.3 MMOL/L (ref 5–15)
ANISOCYTOSIS BLD QL: ABNORMAL
AST SERPL-CCNC: 14 U/L (ref 1–32)
BASOPHILS # BLD MANUAL: 2.17 10*3/MM3 (ref 0–0.2)
BASOPHILS NFR BLD AUTO: 4 % (ref 0–1.5)
BILIRUB SERPL-MCNC: 0.5 MG/DL (ref 0–1.2)
BLASTS NFR BLD MANUAL: 1 % (ref 0–0)
BUN SERPL-MCNC: 8 MG/DL (ref 6–20)
BUN/CREAT SERPL: 12.7 (ref 7–25)
CALCIUM SPEC-SCNC: 8.6 MG/DL (ref 8.6–10.5)
CHLORIDE SERPL-SCNC: 105 MMOL/L (ref 98–107)
CO2 SERPL-SCNC: 22.7 MMOL/L (ref 22–29)
CREAT SERPL-MCNC: 0.63 MG/DL (ref 0.57–1)
DEPRECATED RDW RBC AUTO: 62.2 FL (ref 37–54)
EOSINOPHIL # BLD MANUAL: 1.08 10*3/MM3 (ref 0–0.4)
EOSINOPHIL NFR BLD MANUAL: 2 % (ref 0.3–6.2)
ERYTHROCYTE [DISTWIDTH] IN BLOOD BY AUTOMATED COUNT: 19.9 % (ref 12.3–15.4)
GFR SERPL CREATININE-BSD FRML MDRD: 102 ML/MIN/1.73
GLOBULIN UR ELPH-MCNC: 2.6 GM/DL
GLUCOSE SERPL-MCNC: 320 MG/DL (ref 65–99)
HCG SERPL QL: NEGATIVE
HCT VFR BLD AUTO: 31.3 % (ref 34–46.6)
HGB BLD-MCNC: 10.5 G/DL (ref 12–15.9)
HOLD SPECIMEN: NORMAL
HYPOCHROMIA BLD QL: ABNORMAL
LYMPHOCYTES # BLD MANUAL: 5.42 10*3/MM3 (ref 0.7–3.1)
LYMPHOCYTES NFR BLD MANUAL: 10 % (ref 19.6–45.3)
LYMPHOCYTES NFR BLD MANUAL: 4 % (ref 5–12)
MAGNESIUM SERPL-MCNC: 1.8 MG/DL (ref 1.6–2.6)
MCH RBC QN AUTO: 29.7 PG (ref 26.6–33)
MCHC RBC AUTO-ENTMCNC: 33.5 G/DL (ref 31.5–35.7)
MCV RBC AUTO: 88.4 FL (ref 79–97)
METAMYELOCYTES NFR BLD MANUAL: 3 % (ref 0–0)
MONOCYTES # BLD AUTO: 2.17 10*3/MM3 (ref 0.1–0.9)
MYELOCYTES NFR BLD MANUAL: 8 % (ref 0–0)
NEUTROPHILS # BLD AUTO: 36.85 10*3/MM3 (ref 1.7–7)
NEUTROPHILS NFR BLD MANUAL: 68 % (ref 42.7–76)
NRBC SPEC MANUAL: 2 /100 WBC (ref 0–0.2)
PLAT MORPH BLD: NORMAL
PLATELET # BLD AUTO: 675 10*3/MM3 (ref 140–450)
PMV BLD AUTO: 10.8 FL (ref 6–12)
POLYCHROMASIA BLD QL SMEAR: ABNORMAL
POTASSIUM SERPL-SCNC: 3.7 MMOL/L (ref 3.5–5.2)
PROT SERPL-MCNC: 6.6 G/DL (ref 6–8.5)
RBC # BLD AUTO: 3.54 10*6/MM3 (ref 3.77–5.28)
SMUDGE CELLS BLD QL SMEAR: ABNORMAL
SODIUM SERPL-SCNC: 138 MMOL/L (ref 136–145)
TROPONIN T SERPL-MCNC: <0.01 NG/ML (ref 0–0.03)
WBC # BLD AUTO: 54.19 10*3/MM3 (ref 3.4–10.8)
WHOLE BLOOD HOLD SPECIMEN: NORMAL
WHOLE BLOOD HOLD SPECIMEN: NORMAL

## 2021-04-07 PROCEDURE — 85007 BL SMEAR W/DIFF WBC COUNT: CPT

## 2021-04-07 PROCEDURE — 80053 COMPREHEN METABOLIC PANEL: CPT

## 2021-04-07 PROCEDURE — 83735 ASSAY OF MAGNESIUM: CPT

## 2021-04-07 PROCEDURE — 85025 COMPLETE CBC W/AUTO DIFF WBC: CPT

## 2021-04-07 PROCEDURE — 36415 COLL VENOUS BLD VENIPUNCTURE: CPT

## 2021-04-07 PROCEDURE — 84484 ASSAY OF TROPONIN QUANT: CPT

## 2021-04-07 PROCEDURE — 99211 OFF/OP EST MAY X REQ PHY/QHP: CPT

## 2021-04-07 PROCEDURE — 84703 CHORIONIC GONADOTROPIN ASSAY: CPT

## 2021-04-07 RX ORDER — SODIUM CHLORIDE 0.9 % (FLUSH) 0.9 %
10 SYRINGE (ML) INJECTION AS NEEDED
Status: DISCONTINUED | OUTPATIENT
Start: 2021-04-07 | End: 2021-04-07 | Stop reason: HOSPADM

## 2021-04-07 NOTE — ED TRIAGE NOTES
Patient on oral Chemo for Leukemia. States had some dizziness today onset at 0800. 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 Triage staff wore appropriate PPE during interaction with patient.

## 2021-04-13 ENCOUNTER — HOSPITAL ENCOUNTER (EMERGENCY)
Facility: HOSPITAL | Age: 46
Discharge: LEFT BEFORE DISCHARGE | End: 2021-04-13
Admitting: EMERGENCY MEDICINE

## 2021-04-13 ENCOUNTER — APPOINTMENT (OUTPATIENT)
Dept: GENERAL RADIOLOGY | Facility: HOSPITAL | Age: 46
End: 2021-04-13

## 2021-04-13 ENCOUNTER — APPOINTMENT (OUTPATIENT)
Dept: CT IMAGING | Facility: HOSPITAL | Age: 46
End: 2021-04-13

## 2021-04-13 VITALS
DIASTOLIC BLOOD PRESSURE: 62 MMHG | BODY MASS INDEX: 23.05 KG/M2 | SYSTOLIC BLOOD PRESSURE: 119 MMHG | OXYGEN SATURATION: 99 % | RESPIRATION RATE: 16 BRPM | WEIGHT: 135 LBS | TEMPERATURE: 98 F | HEART RATE: 90 BPM | HEIGHT: 64 IN

## 2021-04-13 DIAGNOSIS — R07.9 CHEST PAIN, UNSPECIFIED TYPE: Primary | ICD-10-CM

## 2021-04-13 LAB
ALBUMIN SERPL-MCNC: 4.2 G/DL (ref 3.5–5.2)
ALBUMIN/GLOB SERPL: 1.4 G/DL
ALP SERPL-CCNC: 94 U/L (ref 39–117)
ALT SERPL W P-5'-P-CCNC: 10 U/L (ref 1–33)
ANION GAP SERPL CALCULATED.3IONS-SCNC: 14 MMOL/L (ref 5–15)
ANISOCYTOSIS BLD QL: ABNORMAL
APTT PPP: 23.4 SECONDS (ref 24–31)
AST SERPL-CCNC: 17 U/L (ref 1–32)
BILIRUB SERPL-MCNC: 0.6 MG/DL (ref 0–1.2)
BLASTS NFR BLD MANUAL: 4 % (ref 0–0)
BUN SERPL-MCNC: 8 MG/DL (ref 6–20)
BUN/CREAT SERPL: 13.1 (ref 7–25)
CALCIUM SPEC-SCNC: 9.3 MG/DL (ref 8.6–10.5)
CHLORIDE SERPL-SCNC: 100 MMOL/L (ref 98–107)
CO2 SERPL-SCNC: 24 MMOL/L (ref 22–29)
CREAT SERPL-MCNC: 0.61 MG/DL (ref 0.57–1)
D DIMER PPP FEU-MCNC: 1.06 MG/L (FEU) (ref 0–0.59)
DEPRECATED RDW RBC AUTO: 62.6 FL (ref 37–54)
EOSINOPHIL # BLD MANUAL: 0.9 10*3/MM3 (ref 0–0.4)
EOSINOPHIL NFR BLD MANUAL: 1 % (ref 0.3–6.2)
ERYTHROCYTE [DISTWIDTH] IN BLOOD BY AUTOMATED COUNT: 20.9 % (ref 12.3–15.4)
GFR SERPL CREATININE-BSD FRML MDRD: 106 ML/MIN/1.73
GLOBULIN UR ELPH-MCNC: 3.1 GM/DL
GLUCOSE SERPL-MCNC: 319 MG/DL (ref 65–99)
HCG SERPL QL: NEGATIVE
HCT VFR BLD AUTO: 29.8 % (ref 34–46.6)
HGB BLD-MCNC: 9.5 G/DL (ref 12–15.9)
HOLD SPECIMEN: NORMAL
INR PPP: 0.99 (ref 0.93–1.1)
LARGE PLATELETS: ABNORMAL
LYMPHOCYTES # BLD MANUAL: 8.06 10*3/MM3 (ref 0.7–3.1)
LYMPHOCYTES NFR BLD MANUAL: 4 % (ref 5–12)
LYMPHOCYTES NFR BLD MANUAL: 6 % (ref 19.6–45.3)
MCH RBC QN AUTO: 27.8 PG (ref 26.6–33)
MCHC RBC AUTO-ENTMCNC: 32.1 G/DL (ref 31.5–35.7)
MCV RBC AUTO: 86.6 FL (ref 79–97)
METAMYELOCYTES NFR BLD MANUAL: 9 % (ref 0–0)
MONOCYTES # BLD AUTO: 3.58 10*3/MM3 (ref 0.1–0.9)
MYELOCYTES NFR BLD MANUAL: 12 % (ref 0–0)
NEUTROPHILS # BLD AUTO: 53.76 10*3/MM3 (ref 1.7–7)
NEUTROPHILS NFR BLD MANUAL: 57 % (ref 42.7–76)
NEUTS BAND NFR BLD MANUAL: 3 % (ref 0–5)
NRBC SPEC MANUAL: 2 /100 WBC (ref 0–0.2)
NT-PROBNP SERPL-MCNC: 195.5 PG/ML (ref 0–450)
OVALOCYTES BLD QL SMEAR: ABNORMAL
PATHOLOGY REVIEW: YES
PLATELET # BLD AUTO: 598 10*3/MM3 (ref 140–450)
PMV BLD AUTO: 8.6 FL (ref 6–12)
POIKILOCYTOSIS BLD QL SMEAR: ABNORMAL
POLYCHROMASIA BLD QL SMEAR: ABNORMAL
POTASSIUM SERPL-SCNC: 3.3 MMOL/L (ref 3.5–5.2)
PROMYELOCYTES NFR BLD MANUAL: 1 % (ref 0–0)
PROT SERPL-MCNC: 7.3 G/DL (ref 6–8.5)
PROTHROMBIN TIME: 10.9 SECONDS (ref 9.6–11.7)
RBC # BLD AUTO: 3.44 10*6/MM3 (ref 3.77–5.28)
SCAN SLIDE: NORMAL
SMUDGE CELLS BLD QL SMEAR: ABNORMAL
SODIUM SERPL-SCNC: 138 MMOL/L (ref 136–145)
SPHEROCYTES BLD QL SMEAR: ABNORMAL
STOMATOCYTES BLD QL SMEAR: ABNORMAL
TROPONIN T SERPL-MCNC: <0.01 NG/ML (ref 0–0.03)
TROPONIN T SERPL-MCNC: <0.01 NG/ML (ref 0–0.03)
VARIANT LYMPHS NFR BLD MANUAL: 3 % (ref 0–5)
WBC # BLD AUTO: 89.6 10*3/MM3 (ref 3.4–10.8)
WHOLE BLOOD HOLD SPECIMEN: NORMAL

## 2021-04-13 PROCEDURE — 93005 ELECTROCARDIOGRAM TRACING: CPT

## 2021-04-13 PROCEDURE — 0 IOPAMIDOL PER 1 ML: Performed by: PHYSICIAN ASSISTANT

## 2021-04-13 PROCEDURE — 85025 COMPLETE CBC W/AUTO DIFF WBC: CPT | Performed by: PHYSICIAN ASSISTANT

## 2021-04-13 PROCEDURE — 71045 X-RAY EXAM CHEST 1 VIEW: CPT

## 2021-04-13 PROCEDURE — 84703 CHORIONIC GONADOTROPIN ASSAY: CPT | Performed by: PHYSICIAN ASSISTANT

## 2021-04-13 PROCEDURE — 83880 ASSAY OF NATRIURETIC PEPTIDE: CPT | Performed by: PHYSICIAN ASSISTANT

## 2021-04-13 PROCEDURE — 85007 BL SMEAR W/DIFF WBC COUNT: CPT | Performed by: PHYSICIAN ASSISTANT

## 2021-04-13 PROCEDURE — 25010000002 DROPERIDOL PER 5 MG: Performed by: PHYSICIAN ASSISTANT

## 2021-04-13 PROCEDURE — 85379 FIBRIN DEGRADATION QUANT: CPT | Performed by: PHYSICIAN ASSISTANT

## 2021-04-13 PROCEDURE — 84484 ASSAY OF TROPONIN QUANT: CPT | Performed by: PHYSICIAN ASSISTANT

## 2021-04-13 PROCEDURE — 99284 EMERGENCY DEPT VISIT MOD MDM: CPT

## 2021-04-13 PROCEDURE — 85730 THROMBOPLASTIN TIME PARTIAL: CPT | Performed by: PHYSICIAN ASSISTANT

## 2021-04-13 PROCEDURE — 93005 ELECTROCARDIOGRAM TRACING: CPT | Performed by: PHYSICIAN ASSISTANT

## 2021-04-13 PROCEDURE — 80053 COMPREHEN METABOLIC PANEL: CPT | Performed by: PHYSICIAN ASSISTANT

## 2021-04-13 PROCEDURE — 71275 CT ANGIOGRAPHY CHEST: CPT

## 2021-04-13 PROCEDURE — 85610 PROTHROMBIN TIME: CPT | Performed by: PHYSICIAN ASSISTANT

## 2021-04-13 PROCEDURE — 96374 THER/PROPH/DIAG INJ IV PUSH: CPT

## 2021-04-13 RX ORDER — NITROGLYCERIN 0.4 MG/1
0.4 TABLET SUBLINGUAL
Status: DISCONTINUED | OUTPATIENT
Start: 2021-04-13 | End: 2021-04-13 | Stop reason: HOSPADM

## 2021-04-13 RX ORDER — DROPERIDOL 2.5 MG/ML
2.5 INJECTION, SOLUTION INTRAMUSCULAR; INTRAVENOUS ONCE
Status: COMPLETED | OUTPATIENT
Start: 2021-04-13 | End: 2021-04-13

## 2021-04-13 RX ORDER — ASPIRIN 81 MG/1
324 TABLET, CHEWABLE ORAL ONCE
Status: COMPLETED | OUTPATIENT
Start: 2021-04-13 | End: 2021-04-13

## 2021-04-13 RX ORDER — SODIUM CHLORIDE 0.9 % (FLUSH) 0.9 %
10 SYRINGE (ML) INJECTION AS NEEDED
Status: DISCONTINUED | OUTPATIENT
Start: 2021-04-13 | End: 2021-04-13 | Stop reason: HOSPADM

## 2021-04-13 RX ADMIN — IOPAMIDOL 100 ML: 755 INJECTION, SOLUTION INTRAVENOUS at 19:28

## 2021-04-13 RX ADMIN — NITROGLYCERIN 0.4 MG: 0.4 TABLET SUBLINGUAL at 19:53

## 2021-04-13 RX ADMIN — DROPERIDOL 2.5 MG: 2.5 INJECTION, SOLUTION INTRAMUSCULAR; INTRAVENOUS at 20:28

## 2021-04-13 RX ADMIN — NITROGLYCERIN 0.4 MG: 0.4 TABLET SUBLINGUAL at 19:00

## 2021-04-13 RX ADMIN — ASPIRIN 81 MG CHEWABLE TABLET 324 MG: 81 TABLET CHEWABLE at 18:58

## 2021-04-13 NOTE — ED PROVIDER NOTES
Subjective   Patient is a 45-year-old with a history of diabetes, CML, comes in the ER complaining of chest pain that started earlier this morning.  Patient describes the pain as a pressure in the center of her chest that radiates to her left shoulder.  Patient states the pain is about a 10 out of 10 pain.  Patient states that nothing seems to make the pain better.  Patient states that she follows with Dr. Lerma for CML in which she takes chemo daily at home.  Patient states she is not had pain like this before.  Patient states that her her pain is sometimes worse with deep breath.  Patient has apparent history of pulmonary embolism and leukemia.  Patient otherwise denies cough, fever, chills nausea, vomiting, abdominal pain.          Review of Systems   Constitutional: Negative for chills, diaphoresis, fatigue and fever.   HENT: Negative for congestion, sore throat, tinnitus and trouble swallowing.    Eyes: Negative for photophobia, discharge and visual disturbance.   Respiratory: Positive for chest tightness and shortness of breath. Negative for cough and wheezing.         Patient complains of a sternal notch that is tender to the touch.   Cardiovascular: Positive for chest pain. Negative for palpitations and leg swelling.   Gastrointestinal: Negative for abdominal pain, diarrhea, nausea and vomiting.   Genitourinary: Negative for dysuria, flank pain and urgency.   Musculoskeletal: Negative for arthralgias and myalgias.   Skin: Negative for rash.   Neurological: Negative for dizziness and headaches.   Psychiatric/Behavioral: Negative for confusion.       Past Medical History:   Diagnosis Date   • Diabetes mellitus (CMS/HCC)    • Leukemia (CMS/HCC) 2019   • Pulmonary embolism (CMS/HCC)        Allergies   Allergen Reactions   • Hydromorphone GI Intolerance   • Morphine Nausea And Vomiting   • Propofol Hives       Past Surgical History:   Procedure Laterality Date   • BONE MARROW BIOPSY     • BREAST SURGERY     •  CHOLECYSTECTOMY     • TUBAL ABDOMINAL LIGATION         Family History   Problem Relation Age of Onset   • Diabetes Mother    • Diabetes Maternal Grandmother        Social History     Socioeconomic History   • Marital status:      Spouse name: Not on file   • Number of children: Not on file   • Years of education: Not on file   • Highest education level: Not on file   Tobacco Use   • Smoking status: Never Smoker   • Smokeless tobacco: Never Used   Substance and Sexual Activity   • Alcohol use: No   • Drug use: No   • Sexual activity: Defer           Objective   Physical Exam  Vitals and nursing note reviewed.   Constitutional:       General: She is not in acute distress.     Appearance: She is well-developed. She is not diaphoretic.   HENT:      Head: Normocephalic and atraumatic.      Nose: Nose normal.      Mouth/Throat:      Pharynx: No oropharyngeal exudate.   Eyes:      Extraocular Movements: Extraocular movements intact.      Conjunctiva/sclera: Conjunctivae normal.      Pupils: Pupils are equal, round, and reactive to light.   Cardiovascular:      Rate and Rhythm: Normal rate and regular rhythm.      Heart sounds: Normal heart sounds. No murmur heard.        Comments: S1, S2 audible.  Pulmonary:      Effort: Pulmonary effort is normal. No respiratory distress.      Breath sounds: Decreased breath sounds (bilaterally) present. No wheezing, rhonchi or rales.      Comments: On room air.  Chest:      Chest wall: Tenderness (inferior sternum/xyphoid process tender to touch. ) present.   Abdominal:      General: Bowel sounds are normal. There is no distension.      Palpations: Abdomen is soft.      Tenderness: There is no abdominal tenderness.   Musculoskeletal:         General: No tenderness or deformity. Normal range of motion.      Cervical back: Normal range of motion.      Right lower leg: No edema.      Left lower leg: No edema.   Skin:     General: Skin is warm.      Capillary Refill: Capillary refill  takes less than 2 seconds.      Findings: No erythema or rash.   Neurological:      Mental Status: She is alert and oriented to person, place, and time.      Cranial Nerves: No cranial nerve deficit.   Psychiatric:         Mood and Affect: Mood normal.         Behavior: Behavior normal.         Procedures           ED Course  ED Course as of Apr 13 2151   Tue Apr 13, 2021   1850 Notified white blood cell count 89.6 by lab    [RL]   1852 Aspirin and nitro ordered.    [RL]   1956 After speaking with the patient again, nitro did not help her pain.  Patient notified of results of labs and scans.    [RL]      ED Course User Index  [RL] Vitor Jiang PA           CT Chest Pulmonary Embolism   Final Result   1.No evidence for pulmonary embolism.   2.No evidence for definitive acute intrathoracic abnormality.   3.Findings suggesting changes of a multinodular goiter of the thyroid.   Recommend correlation with the patient's thyroid function studies.       Electronically Signed By-Amador Marquez MD On:4/13/2021 7:39 PM   This report was finalized on [id number] by  Amador Marquez MD.      XR Chest 1 View   Final Result   There is no significant change when compared to the prior study. There   is no evidence for acute cardiopulmonary process.       Electronically Signed By-Amador Marquez MD On:4/13/2021 7:28 PM   This report was finalized on [id number] by  Amador Marquez MD.             Labs Reviewed   COMPREHENSIVE METABOLIC PANEL - Abnormal; Notable for the following components:       Result Value    Glucose 319 (*)     Potassium 3.3 (*)     All other components within normal limits    Narrative:     GFR Normal >60  Chronic Kidney Disease <60  Kidney Failure <15     APTT - Abnormal; Notable for the following components:    PTT 23.4 (*)     All other components within normal limits   D-DIMER, QUANTITATIVE - Abnormal; Notable for the following components:    D-Dimer, Quantitative 1.06 (*)     All other components within  normal limits    Narrative:     Reference Range  --------------------------------------------------------------------     < 0.50   Negative Predictive Value  0.50-0.59   Indeterminate    >= 0.60   Probable VTE             A very low percentage of patients with DVT may yield D-Dimer results   below the cut-off of 0.50 mg/L FEU.  This is known to be more   prevalent in patients with distal DVT.             Results of this test should always be interpreted in conjunction with   the patient's medical history, clinical presentation and other   findings.  Clinical diagnosis should not be based on the result of   INNOVANCE D-Dimer alone.   CBC WITH AUTO DIFFERENTIAL - Abnormal; Notable for the following components:    WBC 89.60 (*)     RBC 3.44 (*)     Hemoglobin 9.5 (*)     Hematocrit 29.8 (*)     RDW 20.9 (*)     RDW-SD 62.6 (*)     Platelets 598 (*)     All other components within normal limits    Narrative:     The previously reported component NRBC is no longer being reported. Previous result was 0.9 /100 WBC (Reference Range: 0.0-0.2 /100 WBC) on 4/13/2021 at 1849 EDT.   MANUAL DIFFERENTIAL - Abnormal; Notable for the following components:    Lymphocyte % 6.0 (*)     Monocyte % 4.0 (*)     Metamyelocyte % 9.0 (*)     Myelocyte % 12.0 (*)     Promyelocyte % 1.0 (*)     Blasts % 4.0 (*)     Neutrophils Absolute 53.76 (*)     Lymphocytes Absolute 8.06 (*)     Monocytes Absolute 3.58 (*)     Eosinophils Absolute 0.90 (*)     nRBC 2.0 (*)     All other components within normal limits   TROPONIN (IN-HOUSE) - Normal    Narrative:     Troponin T Reference Range:  <= 0.03 ng/mL-   Negative for AMI  >0.03 ng/mL-     Abnormal for myocardial necrosis.  Clinicians would have to utilize clinical acumen, EKG, Troponin and serial changes to determine if it is an Acute Myocardial Infarction or myocardial injury due to an underlying chronic condition.       Results may be falsely decreased if patient taking Biotin.     TROPONIN  (IN-HOUSE) - Normal    Narrative:     Troponin T Reference Range:  <= 0.03 ng/mL-   Negative for AMI  >0.03 ng/mL-     Abnormal for myocardial necrosis.  Clinicians would have to utilize clinical acumen, EKG, Troponin and serial changes to determine if it is an Acute Myocardial Infarction or myocardial injury due to an underlying chronic condition.       Results may be falsely decreased if patient taking Biotin.     PROTIME-INR - Normal   HCG, SERUM, QUALITATIVE - Normal   BNP (IN-HOUSE) - Normal    Narrative:     Among patients with dyspnea, NT-proBNP is highly sensitive for the detection of acute congestive heart failure. In addition NT-proBNP of <300 pg/ml effectively rules out acute congestive heart failure with 99% negative predictive value.    Results may be falsely decreased if patient taking Biotin.     RAINBOW DRAW    Narrative:     The following orders were created for panel order [place] Draw.  Procedure                               Abnormality         Status                     ---------                               -----------         ------                     Light Blue Top[[id number]]                                   Final result               Green Top (Gel)[[id number]]                                  Final result               Lavender Top[[id number]]                                                                Gold Top - SST[[id number]]                                                                Please view results for these tests on the individual orders.   SCAN SLIDE   PATH CONSULT REFLEX   PATHOLOGY CONSULTATION   LIGHT BLUE TOP   GREEN TOP   CBC AND DIFFERENTIAL    Narrative:     The following orders were created for panel order CBC & Differential.  Procedure                               Abnormality         Status                     ---------                               -----------         ------                     Scan Slide[[id number]]                                       Final  result               CBC Auto Differential[[id number]]        Abnormal            Final result                 Please view results for these tests on the individual orders.                        EKG reviewed by myself and interpreted by  that shows sinus rhythm 90 bpm, no ST elevation apparent.    Heart score at least 3.          MDM  Number of Diagnoses or Management Options  Chest pain, unspecified type  Diagnosis management comments: Patient is a 45-year-old female complaining of chest pain.  IV established and labs, EKG and chest x-ray obtained.  Chest x-ray unremarkable for cardiopulmonary process, EKG unremarkable.  Lab work showed an elevated D-dimer, CT PE protocol negative for PE.  Did show thyroid nodule and discussed with patient these results.  Patient given aspirin, nitro and pain did not improve.  Patient may have a component of anxiety and given droperidol.  Patient offered admission and states she cannot be admitted as she has a 5-year-old son at home to take care of.  It was discussed with patient that she would have to leave AGAINST MEDICAL ADVICE.     The patient has requested to leave the ED AGAINST MEDICAL ADVICE.  The patient reasons for leaving include but are not limited to, the following: Caring for her 5-year-old grandchild and states no one can take care of her grandchild.  I believe this patient is of sound mind and competent to refuse medical care.  The patient is responding and asking questions appropriately.  The patient is oriented to person, place and time.  The patient is not psychotic, delusional, suicidal, homicidal or hallucinating.  Patient demonstrates a normal mental capacity to make decisions regarding their healthcare.  The patient is clinically sober and does not appear to be under the influence of any illicit drugs at this time.  The patient has been advised of the risks, in layman terms, of leaving AMA which include, but are not limited to death, coma, permanent  disability, loss of current lifestyle, delay in diagnosis.  Alternatives have been offered and the patient remains steadfast in their wish to leave.  The patient has been advised that if they should change their mind they are welcome to return to this hospital, or any other hospital at any time.  The patient understands that in no way does an AMA discharge mean that I do not want them to have the best medical care available.  To this end, I have provided appropriate prescriptions, referrals, and discharge instructions.  The patient did sign AMA paperwork.  The above discussion was witnessed by another member of staff.             Amount and/or Complexity of Data Reviewed  Clinical lab tests: reviewed  Tests in the radiology section of CPT®: reviewed  Tests in the medicine section of CPT®: reviewed        Final diagnoses:   Chest pain, unspecified type       ED Disposition  ED Disposition     ED Disposition Condition Comment    Mulu Valdez MD  [address] [facility] [place] [facility]  [phone]    Call in 3 days  As needed, If symptoms worsen         Medication List      No changes were made to your prescriptions during this visit.          Vitor Jiang PA  04/13/21 2005

## 2021-04-13 NOTE — ED NOTES
"Pt c/o chest pain that feels like a \"punch in the chest\" second SL nitroglycerin will be given      Cris Lan, RN  04/13/21 1955    "

## 2021-04-14 LAB
LAB AP CASE REPORT: NORMAL
PATH REPORT.FINAL DX SPEC: NORMAL

## 2021-04-14 NOTE — ED NOTES
Pt advised that she would like to leave AMA. Provider spoke with pt regarding risk vs benefit of staying. Pt insists that she needs to go home due to no       Cris Lan, RN  04/13/21 2059

## 2021-04-15 ENCOUNTER — TELEPHONE (OUTPATIENT)
Dept: ONCOLOGY | Facility: CLINIC | Age: 46
End: 2021-04-15

## 2021-04-16 LAB — QT INTERVAL: 356 MS

## 2021-04-16 NOTE — PROGRESS NOTES
Hematology/Oncology Outpatient Follow Up    PATIENT NAME:Nieves Mc  :1975  MRN: [id number]  PRIMARY CARE PHYSICIAN: Provider, No Known  REFERRING PHYSICIAN: Provider, No Known    Chief Complaint   Patient presents with   • Appointment     Leukemia not having achieved remission, unspecified leukemia type (CMS/HCC)   • Follow-up        HISTORY OF PRESENT ILLNESS:      Patient is a 43-year-old female with PMH significant for CML on   treatment with Imatinib.  Patient stated that she typically feels poorly with Imatinib with   frequent nausea and vomiting.  Also complained of weakness and fatigue.  Patient presented to ED   on 10/22/18 with complaints of an elevated blood sugar around 400.  Stated she felt poorly and   has had a productive cough with yellow sputum.  Complained of nausea and stated she was unable to   keep any food down anytime she ate.  The patient reported subjective fever without chills.  She   stated she had been coughing so hard that she was vomiting.  She denied hematemesis.  Denied   diarrhea, constipation or blood in her stool.       Patient’s chest x-ray showed no evidence of acute pulmonary embolus.  The patient has ground-glass   opacities throughout the lungs.  There is some air trapping noted which would appear to be   infectious.  Patient was started on antibiotics.      Hem/Onc was consulted on 10/23/18 for co-management of patient with CML.  Patient was seen by Dr. Lerma on 10/12 on previous admission for patient reported CML on Imatinib (Gleevec).  Patient   reports she is under the care of Dr. Roach at [facility] in [place].  Patient was   seen by Dr. Lerma in May 2018 when patient presented with hematuria, which was attributed to her   Imatinib.  Dr. Lerma followed during hospitalization but then patient returned to Dr. Roach   for her usual follow up.  She was again seen on 10/12.  Patient is stating she will switch to Dr. Lerma.    · Review of   Marley’s note:  On 4/9/18 patient had BCR-abl which was 61.326.  Patient had   been on Imatinib 400 mg daily.  She had presented in March 2018 with WBC of 24, hemoglobin 13.1,   platelet count of 1,067,000.  Patient apparently had follow up BCR-abl in August 2018, results   are not available for review.  Her bone marrow aspiration and biopsy was also performed at that   time is currently not available for review.    · 11/6/18 - .  Uric acid 6.5.  BCR-abl by RT-PCR was measured at 3.36%.    · Due to thrombocytosis, patient was placed on Hydroxyurea 500 mg twice a day on 12/11/18.    Platelet count juana to 900,000.  Patient was noncompliant with CBCs that were recommended.    Patient was asked to return to the office after not being able to reach her.   · 12/27/18 - Bone marrow aspiration and biopsy was consistent with chronic myeloid leukemia.    BCR-abl positive, chronic phase.  ABL kinase mutational analysis is currently pending on the bone   marrow.    · 1/3/19 - Repeat CBC, WBC 17, hemoglobin 11.9, platelet count 1,072,000.  Hydroxyurea was   increased to 1 gm twice a day with follow up CBC.    · 1/6/19 - Follow up CBC showed progressive increase in platelet to 1.1 million.  Patient was   offered admission to the hospital, which she declined.  Hydroxyurea was increased to 1 gm three   times a day as of 1/6/19.   · 1/7/19 - EKG shows sinus tachycardia.   milliseconds.   · ABL kinase on peripheral blood was ordered on 1/11/19.  Based on sequence analysis, there was no   mutation detected.    · 2/12/19 - Patient was initiated on Tasigna 300 mg twice a day.  · 2/13/19 - Urinalysis was negative for hematuria.   · 2/22/19 -  milliseconds.  · 3/13/19 - EKG with QTC is 413 milliseconds.  Nonspecific changes noted.    · 4/18/19 - EKG showed QTC prolonged to 453 milliseconds.  This is changed from prior.   · 4/18/19 - Free T4 normal at 0.91.  Magnesium was 1.7.  BUN is 6.  Creatinine 0.7.   Bilirubin   2.2.  Phosphorous normal 3.7.  TSH 0.43, normal.  Free T3 was normal at 3.47.  Ionized calcium   normal at 1.23.  BCR-abl was 0.522%.    · 5/7/19 - EKG showed QTC of 441 milliseconds.  QT was 366.     · Patient has been lost to follow-up since 2019 for CML.  Patient states that she lost her insurance.  She also does not have any primary care physician at this time.  She is diabetic on insulin.  Past Medical History:   Diagnosis Date   • Diabetes mellitus (CMS/HCC)    • Leukemia (CMS/HCC) 2019   • Pulmonary embolism (CMS/HCC)        Past Surgical History:   Procedure Laterality Date   • BONE MARROW BIOPSY     • BREAST SURGERY     • CHOLECYSTECTOMY     • TUBAL ABDOMINAL LIGATION           Current Outpatient Medications:   •  Accu-Chek Guide test strip, 1 each by Other route 3 (Three) Times a Day Before Meals. Dx: E11.65. Use as instructed, Disp: 100 each, Rfl: 2  •  Accu-Chek Softclix Lancets lancets, 1 each by Other route 3 (Three) Times a Day Before Meals. Dx: E11.65. Use as instructed, Disp: 100 each, Rfl: 2  •  hydroxyurea (HYDREA) 500 MG capsule, Take 2 capsules by mouth 2 (Two) Times a Day., Disp: 60 capsule, Rfl: 0  •  insulin aspart (NovoLOG FlexPen) 100 UNIT/ML solution pen-injector sc pen, NOVOLOG FLEXPEN 100 UNIT/ML SOPN, Disp: , Rfl:   •  Insulin Glargine (LANTUS SOLOSTAR) 100 UNIT/ML injection pen, Inject 20 Units under the skin into the appropriate area as directed Every Night., Disp: 5 pen, Rfl: 3  •  Insulin Pen Needle (Pen Needles) 32G X 4 MM misc, 1 each Daily., Disp: 100 each, Rfl: 2  •  potassium chloride (K-DUR,KLOR-CON) 20 MEQ CR tablet, Take 2 tablets by mouth Daily., Disp: 10 tablet, Rfl: 0  •  allopurinol (ZYLOPRIM) 300 MG tablet, Take 1 tablet by mouth Every Night., Disp: 30 tablet, Rfl: 0  •  ferrous sulfate 324 (65 Fe) MG tablet delayed-release EC tablet, Take 1 tablet by mouth Daily With Breakfast., Disp: 30 tablet, Rfl: 3  •  gabapentin (NEURONTIN) 100 MG capsule, Take 3  capsules by mouth 2 (two) times a day., Disp: 30 capsule, Rfl: 0  •  nilotinib (Tasigna) 150 MG capsule capsule, Take 2 capsules by mouth Take As Directed. Take 2 capsules (300mg) by mouth every 12 hours., Disp: 120 capsule, Rfl: 6  •  ondansetron (ZOFRAN) 4 MG tablet, Take 1 tablet by mouth Every 6 (Six) Hours As Needed for Nausea or Vomiting., Disp: 39 tablet, Rfl: 0    Allergies   Allergen Reactions   • Hydromorphone GI Intolerance   • Morphine Nausea And Vomiting   • Propofol Hives       Family History   Problem Relation Age of Onset   • Diabetes Mother    • Diabetes Maternal Grandmother        Cancer-related family history is not on file.    Social History     Tobacco Use   • Smoking status: Never Smoker   • Smokeless tobacco: Never Used   Substance Use Topics   • Alcohol use: No   • Drug use: No       HPI, ROS and PFSH have been reviewed and confirmed on 4/19/2021.     SUBJECTIVE:    Complain of generalized bone aches and pains        REVIEW OF SYSTEMS:  Review of Systems   Constitutional: Negative for chills and fever.   HENT: Negative for ear pain, mouth sores, nosebleeds and sore throat.    Eyes: Negative for photophobia and visual disturbance.   Respiratory: Negative for wheezing and stridor.    Cardiovascular: Negative for chest pain and palpitations.   Gastrointestinal: Negative for abdominal pain, diarrhea, nausea and vomiting.   Endocrine: Negative for cold intolerance and heat intolerance.   Genitourinary: Negative for dysuria and hematuria.   Musculoskeletal: Negative for joint swelling and neck stiffness.   Skin: Negative for color change and rash.   Neurological: Negative for seizures and syncope.   Hematological: Negative for adenopathy.        No obvious bleeding   Psychiatric/Behavioral: Negative for agitation, confusion and hallucinations.       OBJECTIVE:    Vitals:    04/19/21 0855   BP: 156/97   Pulse: 92   Resp: 18   Temp: 97.1 °F (36.2 °C)   Weight: 64.3 kg (141 lb 12.8 oz)   Height: 162.6  "cm (64\")   PainSc: 10-Worst pain ever   PainLoc: Generalized  Comment: bones     Body mass index is 24.34 kg/m².    ECOG  (1) Restricted in physically strenuous activity, ambulatory and able to do work of light nature    Physical Exam  Vitals and nursing note reviewed.   Constitutional:       General: She is not in acute distress.     Appearance: She is not diaphoretic.   HENT:      Head: Normocephalic and atraumatic.   Eyes:      General: No scleral icterus.        Right eye: No discharge.         Left eye: No discharge.      Conjunctiva/sclera: Conjunctivae normal.   Neck:      Thyroid: No thyromegaly.   Cardiovascular:      Rate and Rhythm: Normal rate and regular rhythm.      Heart sounds: Normal heart sounds. No friction rub. No gallop.    Pulmonary:      Effort: Pulmonary effort is normal. No respiratory distress.      Breath sounds: No stridor. No wheezing.   Abdominal:      General: Bowel sounds are normal.      Palpations: Abdomen is soft. There is no mass.      Tenderness: There is no abdominal tenderness. There is no guarding or rebound.   Musculoskeletal:         General: No tenderness. Normal range of motion.      Cervical back: Normal range of motion and neck supple.   Lymphadenopathy:      Cervical: No cervical adenopathy.   Skin:     General: Skin is warm.      Findings: No erythema or rash.   Neurological:      Mental Status: She is alert and oriented to person, place, and time.      Motor: No abnormal muscle tone.   Psychiatric:         Behavior: Behavior normal.         RECENT LABS  WBC   Date Value Ref Range Status   04/19/2021 108.98 (C) 3.40 - 10.80 10*3/mm3 Final     RBC   Date Value Ref Range Status   04/19/2021 3.24 (L) 3.77 - 5.28 10*6/mm3 Final     Hemoglobin   Date Value Ref Range Status   04/19/2021 9.2 (L) 12.0 - 15.9 g/dL Final     Hematocrit   Date Value Ref Range Status   04/19/2021 29.7 (L) 34.0 - 46.6 % Final     MCV   Date Value Ref Range Status   04/19/2021 91.7 79.0 - 97.0 fL " Final     MCH   Date Value Ref Range Status   04/19/2021 28.4 26.6 - 33.0 pg Final     MCHC   Date Value Ref Range Status   04/19/2021 31.0 (L) 31.5 - 35.7 g/dL Final     RDW   Date Value Ref Range Status   04/19/2021 21.1 (H) 12.3 - 15.4 % Final     RDW-SD   Date Value Ref Range Status   04/19/2021 66.7 (H) 37.0 - 54.0 fl Final     MPV   Date Value Ref Range Status   04/19/2021 10.5 6.0 - 12.0 fL Final     Platelets   Date Value Ref Range Status   04/19/2021 481 (H) 140 - 450 10*3/mm3 Final     Neutrophil %   Date Value Ref Range Status   10/16/2019 60.0 42.7 - 76.0 % Final     Lymphocyte %   Date Value Ref Range Status   10/16/2019 32.1 19.6 - 45.3 % Final     Monocyte %   Date Value Ref Range Status   04/19/2021 8.0 5.0 - 12.0 % Final     Eosinophil %   Date Value Ref Range Status   04/19/2021 1.0 0.3 - 6.2 % Final     Basophil %   Date Value Ref Range Status   10/16/2019 1.3 0.0 - 1.5 % Final     Neutrophils Absolute   Date Value Ref Range Status   04/13/2021 53.76 (H) 1.70 - 7.00 10*3/mm3 Final     Neutrophils, Absolute   Date Value Ref Range Status   10/16/2019 5.60 1.70 - 7.00 10*3/mm3 Final     Lymphocytes, Absolute   Date Value Ref Range Status   10/16/2019 3.00 0.70 - 3.10 10*3/mm3 Final     Monocytes, Absolute   Date Value Ref Range Status   04/19/2021 8.73 (H) 0.10 - 0.90 10*3/mm3 Final     Eosinophils, Absolute   Date Value Ref Range Status   04/19/2021 1.04 (H) 0.00 - 0.40 10*3/mm3 Final     Basophils Absolute   Date Value Ref Range Status   04/07/2021 2.17 (H) 0.00 - 0.20 10*3/mm3 Final     Basophils, Absolute   Date Value Ref Range Status   10/16/2019 0.10 0.00 - 0.20 10*3/mm3 Final     nRBC   Date Value Ref Range Status   04/13/2021 2.0 (H) 0.0 - 0.2 /100 WBC Final   10/16/2019 0.2 0.0 - 0.2 /100 WBC Final       Lab Results   Component Value Date    GLUCOSE 319 (H) 04/13/2021    BUN 8 04/13/2021    CREATININE 0.61 04/13/2021    EGFRIFNONA 106 04/13/2021    BCR 13.1 04/13/2021    K 3.3 (L) 04/13/2021     CO2 24.0 04/13/2021    CALCIUM 9.3 04/13/2021    ALBUMIN 4.20 04/13/2021    LABIL2 1.0 04/18/2019    AST 17 04/13/2021    ALT 10 04/13/2021         Assessment/Plan     CLL (chronic lymphocytic leukemia) (CMS/[facility])  - CBC & Differential  - BCR-ABL1, CML / ALL, PCR, Quant  - Ambulatory Referral to Pain Management  - CBC & Differential  - nilotinib (Tasigna) 150 MG capsule capsule  - BCR-ABL1, CML / ALL, PCR, Quant      ASSESSMENT    1. CML in chronic phase with no significant hematologic or molecular or cytogenetic response on   Imatinib.  ABL kinase mutational analysis was negative.  Patient is to restart to [employer].  We will have represcribed to Tasigna 300 mg twice a day.  I have also had our financial counselor involved to help her obtain this medication.  In the meantime she will remain on hydroxyurea to increase dose to 1500 mg in the morning and 1000 mg in the evening.  Discussed that she would need to have weekly CBCs for now.  We will begin to cut back on hydroxyurea once she is back on to Cig.  I have also drawn BCR ABL today.       2. Currently on Tasigna 300 mg twice a day, initiated 2/12/19.   3. Persistent hematuria of unclear etiology.  Urology referral has been given.   4. Leukocytosis, thrombocytosis due to CML.         5. Medication noncompliance.   6. History of bilateral pneumonia, status post bronchoscopy.  Patient with persistent cough.     7. History of pulmonary embolism, currently on Xarelto.   8. Diabetes type I.  9. Depression, but denies any suicidal or homicidal ideation.   10. Dehydration, nausea secondary to Tasigna.   11. Generalized body aches and pains secondary to leukemia and also possibly Tasigna.    12. Prolonged QTC to 453 milliseconds.  13. Possible right rotator cuff injury.      Plans    · Referred to pain management with Dr. Chatterjee  · We will obtain EKG at the next visit  · BCR ABL quantitative PCR today  · Tasigna 300 mg p.o. twice a day  · [place] to help with  medication  · Referred for PCP establishment  · All questions answered        Plans as listed above         I spent 40 total minutes, face-to-face, caring for Hope today.  80% of this time involved counseling and/or coordination of care as documented within this note.

## 2021-04-19 ENCOUNTER — LAB (OUTPATIENT)
Dept: LAB | Facility: HOSPITAL | Age: 46
End: 2021-04-19

## 2021-04-19 ENCOUNTER — OFFICE VISIT (OUTPATIENT)
Dept: ONCOLOGY | Facility: CLINIC | Age: 46
End: 2021-04-19

## 2021-04-19 VITALS
HEART RATE: 92 BPM | TEMPERATURE: 97.1 F | WEIGHT: 141.8 LBS | BODY MASS INDEX: 24.21 KG/M2 | HEIGHT: 64 IN | RESPIRATION RATE: 18 BRPM | DIASTOLIC BLOOD PRESSURE: 97 MMHG | SYSTOLIC BLOOD PRESSURE: 156 MMHG

## 2021-04-19 DIAGNOSIS — C91.10 CLL (CHRONIC LYMPHOCYTIC LEUKEMIA) (HCC): ICD-10-CM

## 2021-04-19 DIAGNOSIS — C91.10 CLL (CHRONIC LYMPHOCYTIC LEUKEMIA) (HCC): Primary | ICD-10-CM

## 2021-04-19 LAB
BASOPHILS NFR BLD AUTO: ABNORMAL %
DEPRECATED RDW RBC AUTO: 66.7 FL (ref 37–54)
EOSINOPHIL # BLD AUTO: 1.04 10*3/MM3 (ref 0–0.4)
EOSINOPHIL NFR BLD AUTO: 1 % (ref 0.3–6.2)
ERYTHROCYTE [DISTWIDTH] IN BLOOD BY AUTOMATED COUNT: 21.1 % (ref 12.3–15.4)
HCT VFR BLD AUTO: 29.7 % (ref 34–46.6)
HGB BLD-MCNC: 9.2 G/DL (ref 12–15.9)
LYMPHOCYTES NFR BLD AUTO: ABNORMAL %
MCH RBC QN AUTO: 28.4 PG (ref 26.6–33)
MCHC RBC AUTO-ENTMCNC: 31 G/DL (ref 31.5–35.7)
MCV RBC AUTO: 91.7 FL (ref 79–97)
MONOCYTES # BLD AUTO: 8.73 10*3/MM3 (ref 0.1–0.9)
MONOCYTES NFR BLD AUTO: 8 % (ref 5–12)
NEUTROPHILS NFR BLD AUTO: ABNORMAL %
PLATELET # BLD AUTO: 481 10*3/MM3 (ref 140–450)
PMV BLD AUTO: 10.5 FL (ref 6–12)
RBC # BLD AUTO: 3.24 10*6/MM3 (ref 3.77–5.28)
WBC # BLD AUTO: 108.98 10*3/MM3 (ref 3.4–10.8)

## 2021-04-19 PROCEDURE — 99215 OFFICE O/P EST HI 40 MIN: CPT | Performed by: INTERNAL MEDICINE

## 2021-04-19 PROCEDURE — 36415 COLL VENOUS BLD VENIPUNCTURE: CPT | Performed by: INTERNAL MEDICINE

## 2021-04-19 PROCEDURE — 85025 COMPLETE CBC W/AUTO DIFF WBC: CPT

## 2021-04-19 RX ORDER — INSULIN ASPART 100 [IU]/ML
3 INJECTION, SOLUTION INTRAVENOUS; SUBCUTANEOUS
COMMUNITY
Start: 2019-01-21 | End: 2021-09-28 | Stop reason: SDUPTHER

## 2021-04-23 ENCOUNTER — MEDICATION THERAPY MANAGEMENT (OUTPATIENT)
Dept: PHARMACY | Facility: HOSPITAL | Age: 46
End: 2021-04-23

## 2021-04-23 DIAGNOSIS — C91.10 CLL (CHRONIC LYMPHOCYTIC LEUKEMIA) (HCC): Primary | ICD-10-CM

## 2021-04-25 LAB
INTERPRETATION: POSITIVE
LAB DIRECTOR NAME PROVIDER: NORMAL
LABORATORY COMMENT REPORT: NORMAL
REF LAB TEST METHOD: NORMAL
T(ABL1,BCR)B2A2/CONTROL BLD/T: 7.35 %
T(ABL1,BCR)B3A2/CONTROL BLD/T: 29.59 %
T(ABL1,BCR)E1A2/CONTROL BLD/T: 0.11 %

## 2021-04-26 DIAGNOSIS — E13.65 UNCONTROLLED OTHER SPECIFIED DIABETES MELLITUS WITH HYPERGLYCEMIA (HCC): Primary | ICD-10-CM

## 2021-04-29 ENCOUNTER — MEDICATION THERAPY MANAGEMENT (OUTPATIENT)
Dept: PHARMACY | Facility: HOSPITAL | Age: 46
End: 2021-04-29

## 2021-04-29 NOTE — PROGRESS NOTES
Oral Chemotherapy Teaching      Patient Name/:  Nieves Mc   1975  Oral Chemotherapy Regimen: Tasigna 300mg BID  Date Started Medication: 21    Initial Teaching Follow Up Comments     Safety     Storage instructions (away from children; away from heat/cold, sunlight, or moisture), handling - use of gloves (caregivers), washing hands after touching pills, managing waste     “How are you storing your medications?”, reminders on storage, proper handling (caregivers using gloves, washing hands, away from children, managing waste, etc.), disposal of medication with D/C or dosage change    Patient counseled on hazardous handling and storage precautions. Discussed plan to wash hands after handling. Caregivers to handle with latex gloves. Reviewed safe sex practices. Discussed appropriate management of bodily fluids. Store at room temperature, away from pets and children. Pt verbalized understanding.        Adherence      patient and/or caregiver on how to take medication, take with/without food, assess their adherence potential, stress importance of adherence, ways to manage adherence (pill boxes, phone reminders, calendars), what to do if miss a dose   “How are you taking your medication?” “How are you remembering to take your medication?”, “How many doses have you missed?”, determine reasons for non-adherence (not remembering, side effects, etc), ways to improve, overadherence? Remind patient of ways to improve/maintain adherence   Discussed importance of taking medication twice a day on an empty stomach, as prescribed.  Discussed importance of keeping all lab monitoring appointments.  Discussed how to handle missed doses and that two doses should never be taken together.     Side Effects/Adverse Reactions      patient on potential side effects, s/s, ways to manage, when to call MD/seek help     Determine if patient experiencing side effects, ways to manage  Discussed potential side effects  including changes in liver function, changes in electrolytes, rash or itchy skin, nausea or vomiting, headache, fatigue, decreased platelet or increased risk of bleeding, decreased wbc and increased risk of infection, changes in QT prolongation, bone marrow suppression.       Miscellaneous     Food interactions, DDIs, financial issues Determine if patient started any new medications since being placed on oral chemo (analyze for DDI) Drug food interaction with grapefruit and grapefruit juices and patient advised to avoid.  Told to stop ondansetron and start prochlorperazine.     Additional Notes:  Discussed aforementioned material with patient in clinic/over the phone. All questions and concerns addressed. Provided patient with my contact information and instructions to call should additional questions arise. Pt to sign CCA and consent on 4/30/21 and instructed to not start medication until labs and EKG are drawn.  Patient also reported swelling in one leg that is painful to touch.  Consulted with Dr. Lerma and patient was instructed to go to ER for evaluation of blood clot.  Patient said she would go tomorrow and repeated that it was advised that she go now for evaluation. Provided patient with personalized treatment plan and written materials on medication..

## 2021-04-30 ENCOUNTER — TELEPHONE (OUTPATIENT)
Dept: ONCOLOGY | Facility: HOSPITAL | Age: 46
End: 2021-04-30

## 2021-04-30 ENCOUNTER — HOSPITAL ENCOUNTER (OUTPATIENT)
Dept: ONCOLOGY | Facility: HOSPITAL | Age: 46
Setting detail: INFUSION SERIES
Discharge: HOME | End: 2021-04-30

## 2021-04-30 ENCOUNTER — DOCUMENTATION (OUTPATIENT)
Dept: ONCOLOGY | Facility: HOSPITAL | Age: 46
End: 2021-04-30

## 2021-04-30 ENCOUNTER — LAB (OUTPATIENT)
Dept: LAB | Facility: HOSPITAL | Age: 46
End: 2021-04-30

## 2021-04-30 DIAGNOSIS — C91.10 CLL (CHRONIC LYMPHOCYTIC LEUKEMIA) (HCC): ICD-10-CM

## 2021-04-30 DIAGNOSIS — C91.10 CLL (CHRONIC LYMPHOCYTIC LEUKEMIA) (HCC): Primary | ICD-10-CM

## 2021-04-30 LAB
BASOPHILS NFR BLD AUTO: ABNORMAL %
DEPRECATED RDW RBC AUTO: 67.1 FL (ref 37–54)
EOSINOPHIL # BLD AUTO: 1.37 10*3/MM3 (ref 0–0.4)
EOSINOPHIL NFR BLD AUTO: 1.5 % (ref 0.3–6.2)
ERYTHROCYTE [DISTWIDTH] IN BLOOD BY AUTOMATED COUNT: 21 % (ref 12.3–15.4)
HCT VFR BLD AUTO: 30.5 % (ref 34–46.6)
HGB BLD-MCNC: 9.2 G/DL (ref 12–15.9)
LYMPHOCYTES NFR BLD AUTO: ABNORMAL %
MCH RBC QN AUTO: 28 PG (ref 26.6–33)
MCHC RBC AUTO-ENTMCNC: 30.2 G/DL (ref 31.5–35.7)
MCV RBC AUTO: 92.7 FL (ref 79–97)
MONOCYTES # BLD AUTO: 5.9 10*3/MM3 (ref 0.1–0.9)
MONOCYTES NFR BLD AUTO: 6.5 % (ref 5–12)
NEUTROPHILS NFR BLD AUTO: ABNORMAL %
PLATELET # BLD AUTO: 316 10*3/MM3 (ref 140–450)
PMV BLD AUTO: 10.4 FL (ref 6–12)
RBC # BLD AUTO: 3.29 10*6/MM3 (ref 3.77–5.28)
WBC # BLD AUTO: 90.16 10*3/MM3 (ref 3.4–10.8)

## 2021-04-30 PROCEDURE — 36415 COLL VENOUS BLD VENIPUNCTURE: CPT

## 2021-04-30 PROCEDURE — 85025 COMPLETE CBC W/AUTO DIFF WBC: CPT

## 2021-04-30 NOTE — TELEPHONE ENCOUNTER
Case Management/ Note    Patient Name: Nieves Lawsonens  YOB: 1975  MRN #: [id number]    OSW received a call from Angelique suarez [facility] who said she would call Nieves to screen her for Medicaid.     Electronically signed by:   Jennifer Gupta LCSW, OSW-C  04/30/21, 15:52 EDT
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
## 2021-05-03 ENCOUNTER — MEDICATION THERAPY MANAGEMENT (OUTPATIENT)
Dept: PHARMACY | Facility: HOSPITAL | Age: 46
End: 2021-05-03

## 2021-05-03 DIAGNOSIS — C95.90 LEUKEMIA NOT HAVING ACHIEVED REMISSION, UNSPECIFIED LEUKEMIA TYPE (HCC): Primary | ICD-10-CM

## 2021-05-03 RX ORDER — PROCHLORPERAZINE MALEATE 10 MG
10 TABLET ORAL EVERY 4 HOURS PRN
Qty: 30 TABLET | Refills: 5 | OUTPATIENT
Start: 2021-05-03 | End: 2021-09-28

## 2021-05-03 RX ORDER — PROCHLORPERAZINE MALEATE 10 MG
10 TABLET ORAL EVERY 4 HOURS PRN
Qty: 30 TABLET | Refills: 5 | Status: CANCELLED | OUTPATIENT
Start: 2021-05-03

## 2021-05-03 NOTE — PROGRESS NOTES
[facility] Note: ECG    Attempted to reach Hope on two occassions today to follow up on needed ECG to start her Tasigna.  No answer either time but was able to leave  with call back to [facility] [phone].   stated that we needed patient to get ECG and labs and orders are in and can be done at the hospital without an appointment between 9-4.

## 2021-05-05 ENCOUNTER — MEDICATION THERAPY MANAGEMENT (OUTPATIENT)
Dept: PHARMACY | Facility: HOSPITAL | Age: 46
End: 2021-05-05

## 2021-05-05 NOTE — PROGRESS NOTES
MTM Note: Aram    Tried to reach Hope again via phone with call back left to [facility] office [phone].  Pt needs to get baseline blood work and also EKG so she can start Tasigna.

## 2021-05-06 ENCOUNTER — MEDICATION THERAPY MANAGEMENT (OUTPATIENT)
Dept: PHARMACY | Facility: HOSPITAL | Age: 46
End: 2021-05-06

## 2021-05-06 NOTE — PROGRESS NOTES
MTM Note: Aram    Attempted to reach Hope via phone regarding EKG and labs.  No answer. VM left with call back [phone].  Tried mother's number too and no answer.

## 2021-05-10 ENCOUNTER — MEDICATION THERAPY MANAGEMENT (OUTPATIENT)
Dept: ONCOLOGY | Facility: HOSPITAL | Age: 46
End: 2021-05-10

## 2021-05-10 NOTE — PROGRESS NOTES
MTM Follow Up: Aram    Spoke with Hope.  She reports that she has completely lost vision in her right eye so has not been able to come in for labs or EKG because she cannot drive.  She has not started Tasigna but is taking Hydrea 1000mg BID.  She has a ride on Friday and will come for labs and get EKG.  Patient also reports that the swelling in her legs comes and goes but she hasn't gone to the ER as recommended.

## 2021-05-11 DIAGNOSIS — C95.90 LEUKEMIA NOT HAVING ACHIEVED REMISSION, UNSPECIFIED LEUKEMIA TYPE (HCC): Primary | ICD-10-CM

## 2021-05-13 DIAGNOSIS — C91.10 CLL (CHRONIC LYMPHOCYTIC LEUKEMIA) (HCC): ICD-10-CM

## 2021-05-13 DIAGNOSIS — Z01.818 EXAMINATION PRIOR TO CHEMOTHERAPY: ICD-10-CM

## 2021-05-13 DIAGNOSIS — E11.9 TYPE 2 DIABETES MELLITUS WITHOUT COMPLICATION, WITHOUT LONG-TERM CURRENT USE OF INSULIN (HCC): Primary | ICD-10-CM

## 2021-05-13 DIAGNOSIS — C95.90 LEUKEMIA NOT HAVING ACHIEVED REMISSION, UNSPECIFIED LEUKEMIA TYPE (HCC): Primary | ICD-10-CM

## 2021-05-13 DIAGNOSIS — E11.9 TYPE 2 DIABETES MELLITUS WITHOUT COMPLICATION, WITHOUT LONG-TERM CURRENT USE OF INSULIN (HCC): ICD-10-CM

## 2021-05-13 DIAGNOSIS — C92.10 CML (CHRONIC MYELOCYTIC LEUKEMIA) (HCC): ICD-10-CM

## 2021-05-14 ENCOUNTER — TELEPHONE (OUTPATIENT)
Dept: ONCOLOGY | Facility: HOSPITAL | Age: 46
End: 2021-05-14

## 2021-05-14 NOTE — TELEPHONE ENCOUNTER
Case Management/ Note    Patient Name: Nieves Mc  YOB: 1975  MRN #: [id number]    OSW called patient at the request of ginger Sommer. She said patient missed her lab appointment due to her eye surgery. Nieves is alert and oriented to person, place and time. She is pleasant. She said she had eye surgery and was told this should correct the bleeding in her eye. She said she needs the same surgery in the opposite eye. She is concerned about her high blood sugar levels compromising her eye sight. She said the Tasigna raises blood sugar which is why she has not started the medication. She said she has an appointment with the doctor at [facility] in September and begins her education classes late this month. She is open to seeing the doctor at [facility] earlier if that can be accommodated. OSW spoke with CAMILA Mendez who said she can call [facility] and see if this can be done. Patient plans on going tomorrow to get her EKG and labs. She was told she could do both at the hospital since she was relying on someone else for transportation.  Supportive care provided. OSW will remain available.     Electronically signed by:   Jennifer Gupta LCSW, OSW-C  05/14/21, 13:32 EDT

## 2021-05-19 ENCOUNTER — TELEPHONE (OUTPATIENT)
Dept: ONCOLOGY | Facility: CLINIC | Age: 46
End: 2021-05-19

## 2021-05-19 NOTE — TELEPHONE ENCOUNTER
Attempted to call [facility] to see if the pt's appointment in September can be moved up. Message left on identifying VM with callback number.

## 2021-05-19 NOTE — TELEPHONE ENCOUNTER
Received a return call from [facility]. I explained that the pt is supposed to start an oral chemotherapy that can increase blood sugar and she is very concerned about her eyesight. I asked if there was any way the pt could be seen earlier than September. She told me that the pt is seeing the diabetic educator on Tuesday so they can help with managing that.

## 2021-05-25 ENCOUNTER — TELEPHONE (OUTPATIENT)
Dept: ENDOCRINOLOGY | Facility: CLINIC | Age: 46
End: 2021-05-25

## 2021-05-25 ENCOUNTER — OFFICE VISIT (OUTPATIENT)
Dept: ENDOCRINOLOGY | Facility: CLINIC | Age: 46
End: 2021-05-25

## 2021-05-25 DIAGNOSIS — E11.65 UNCONTROLLED TYPE 2 DIABETES MELLITUS WITH HYPERGLYCEMIA (HCC): Primary | ICD-10-CM

## 2021-05-25 DIAGNOSIS — E11.9 TYPE 2 DIABETES MELLITUS WITHOUT COMPLICATION, WITHOUT LONG-TERM CURRENT USE OF INSULIN (HCC): Chronic | ICD-10-CM

## 2021-05-25 PROCEDURE — G0108 DIAB MANAGE TRN  PER INDIV: HCPCS | Performed by: DIETITIAN, REGISTERED

## 2021-05-25 RX ORDER — BLOOD SUGAR DIAGNOSTIC
1 STRIP MISCELLANEOUS
Qty: 100 EACH | Refills: 2 | Status: SHIPPED | OUTPATIENT
Start: 2021-05-25 | End: 2022-01-12

## 2021-05-25 RX ORDER — LANCETS
1 EACH MISCELLANEOUS
Qty: 100 EACH | Refills: 2 | Status: SHIPPED | OUTPATIENT
Start: 2021-05-25 | End: 2022-01-12

## 2021-05-25 NOTE — TELEPHONE ENCOUNTER
Absolutely! Thanks for asking.  She can start out taking Novolog 2 units per each 50 mg bl sugar>200.  And may need a scheduled pre meal dose as well ( ie 4 units ac tid )

## 2021-05-25 NOTE — TELEPHONE ENCOUNTER
You will be seeing as a new pt on 9/28. I instructed her on use of Accu-Chek meter that she received from the hospital during initial assessment appt today. Pt requesting that test strips and lancets be sent to [facility] in [place]. Rx's ready for signature.     She has Leukemia and is taking a chemotherapy drug called Tasigna 2 capsules BID. It is causing her BG to be elevated. Last A1c in March was 11.4. She is currently only taking Lantus. She only had maybe 3-4 readings in her meter, but they ranged from 200-400's. Per MD s/o I increased her from 20 to 22 units of the Lantus. She stated that she used to use Novolog, but wasn't given it last time she was D/C from the hospital. Do you feel that she may be needing to use this again? Let me know your recommendations. She is going to be sending in BG's in 1 week. Thanks!

## 2021-05-26 ENCOUNTER — TELEPHONE (OUTPATIENT)
Dept: ONCOLOGY | Facility: HOSPITAL | Age: 46
End: 2021-05-26

## 2021-05-26 NOTE — TELEPHONE ENCOUNTER
Case Management/ Note    Patient Name: Nieves Mc  YOB: 1975  MRN #: [id number]    OSW called patient at the request of ginger Sommer. Left a message with patient requesting call back.     Electronically signed by:   Jennifer Gupta LCSW, OSW-C  05/26/21, 13:20 EDT

## 2021-05-27 NOTE — TELEPHONE ENCOUNTER
In case you have time while on phones today. I tried to call her yesterday, but had to leave a voicemail.

## 2021-06-07 ENCOUNTER — OFFICE VISIT (OUTPATIENT)
Dept: PAIN MEDICINE | Facility: CLINIC | Age: 46
End: 2021-06-07

## 2021-06-07 VITALS
OXYGEN SATURATION: 98 % | BODY MASS INDEX: 22.71 KG/M2 | HEIGHT: 64 IN | HEART RATE: 98 BPM | RESPIRATION RATE: 16 BRPM | WEIGHT: 133 LBS | SYSTOLIC BLOOD PRESSURE: 131 MMHG | DIASTOLIC BLOOD PRESSURE: 84 MMHG

## 2021-06-07 DIAGNOSIS — Z79.899 HIGH RISK MEDICATION USE: Primary | ICD-10-CM

## 2021-06-07 DIAGNOSIS — G90.522 COMPLEX REGIONAL PAIN SYNDROME TYPE 1 OF LEFT LOWER EXTREMITY: ICD-10-CM

## 2021-06-07 PROCEDURE — 99204 OFFICE O/P NEW MOD 45 MIN: CPT | Performed by: STUDENT IN AN ORGANIZED HEALTH CARE EDUCATION/TRAINING PROGRAM

## 2021-06-07 RX ORDER — AMITRIPTYLINE HYDROCHLORIDE 50 MG/1
50 TABLET, FILM COATED ORAL NIGHTLY
Qty: 30 TABLET | Refills: 0 | Status: SHIPPED | OUTPATIENT
Start: 2021-06-07 | End: 2021-07-19 | Stop reason: SDUPTHER

## 2021-06-07 RX ORDER — OXYCODONE AND ACETAMINOPHEN 10; 325 MG/1; MG/1
1 TABLET ORAL 2 TIMES DAILY PRN
Qty: 60 TABLET | Refills: 0 | Status: SHIPPED | OUTPATIENT
Start: 2021-06-07 | End: 2021-07-19 | Stop reason: SDUPTHER

## 2021-06-07 NOTE — PROGRESS NOTES
CHIEF COMPLAINT  Chief Complaint   Patient presents with   • Leukemia      x3 yrs--( seeing cancer doctor)--- no  cbd use-- no narcotics--( reviewed and  updated med list) New Patient   • Leg Pain     pain and numbness with trouble walking and it giving out       Primary Care  Provider, No Known    Subjective   Hope NICHOLAS Mc is a 45 y.o. female  who presents for cancer-related pain and left lower extremity allodynia.  She states that she is having progressively worsening pain for many years following the diagnosis of CML.  She has been previously treated with over-the-counter medications which are no longer effective.  She states in the past, she had taken some hydrocodone which was also not effective.  She is unable to tolerate gabapentin due to side effects.  In addition to describing generalized leg and body aches from her cancer, she also describes allodynia-like symptoms in the left leg.  She states is difficult for her to wear shoes due to significant pain in the left foot.  She also describes pain with light touch of the left lower extremity.  She endorses swelling in the left lower extremity as well as changing of temperature left lower extremity.  She also has significant trouble walking and some weakness    History of Present Illness     Location: Whole body, left lower extremity  Onset: Years ago  Duration: Progressively worsening  Timing: Constant throughout the day  Quality: Aching sharp pains in the left lower extremity  Severity: Today: 10       Last Week: 10       Worst: 10  Modifying Factors: The pain is worse with any type of movement and physical activity.  Pain is also exacerbated by light touch of the left lower extremity    Physical Therapy: no    Interval Update 06/07/2021:     The following portions of the patient's history were reviewed and updated as appropriate: allergies, current medications, past family history, past medical history, past social history, past surgical history and problem  list.      Current Outpatient Medications:   •  Accu-Chek Guide test strip, 1 each by Other route 3 (Three) Times a Day Before Meals. Dx: E11.65. Use as instructed, Disp: 100 each, Rfl: 2  •  Accu-Chek Softclix Lancets lancets, 1 each by Other route 3 (Three) Times a Day Before Meals. Dx: E11.65. Use as instructed, Disp: 100 each, Rfl: 2  •  hydroxyurea (HYDREA) 500 MG capsule, Take 2 capsules by mouth 2 (Two) Times a Day., Disp: 60 capsule, Rfl: 0  •  insulin aspart (NovoLOG FlexPen) 100 UNIT/ML solution pen-injector sc pen, NOVOLOG FLEXPEN 100 UNIT/ML SOPN, Disp: , Rfl:   •  Insulin Glargine (LANTUS SOLOSTAR) 100 UNIT/ML injection pen, Inject 20 Units under the skin into the appropriate area as directed Every Night., Disp: 5 pen, Rfl: 3  •  Insulin Pen Needle (Pen Needles) 32G X 4 MM misc, 1 each Daily., Disp: 100 each, Rfl: 2  •  nilotinib (Tasigna) 150 MG capsule capsule, Take 2 capsules by mouth Take As Directed. Take 2 capsules (300mg) by mouth every 12 hours., Disp: 120 capsule, Rfl: 6  •  allopurinol (ZYLOPRIM) 300 MG tablet, Take 1 tablet by mouth Every Night., Disp: 30 tablet, Rfl: 0  •  amitriptyline (ELAVIL) 50 MG tablet, Take 1 tablet by mouth Every Night., Disp: 30 tablet, Rfl: 0  •  ferrous sulfate 324 (65 Fe) MG tablet delayed-release EC tablet, Take 1 tablet by mouth Daily With Breakfast., Disp: 30 tablet, Rfl: 3  •  ondansetron (ZOFRAN) 4 MG tablet, Take 1 tablet by mouth Every 6 (Six) Hours As Needed for Nausea or Vomiting., Disp: 39 tablet, Rfl: 0  •  oxyCODONE-acetaminophen (PERCOCET)  MG per tablet, Take 1 tablet by mouth 2 (Two) Times a Day As Needed for Moderate Pain ., Disp: 60 tablet, Rfl: 0  •  potassium chloride (K-DUR,KLOR-CON) 20 MEQ CR tablet, Take 2 tablets by mouth Daily., Disp: 10 tablet, Rfl: 0  •  prochlorperazine (COMPAZINE) 10 MG tablet, Take 1 tablet by mouth Every 4 (Four) Hours As Needed for Nausea or Vomiting., Disp: 30 tablet, Rfl: 5    Review of Systems  "  Constitutional: Positive for fatigue.   Musculoskeletal: Positive for arthralgias and gait problem.   Neurological: Positive for weakness and numbness.       Vitals:    06/07/21 0920   BP: 131/84   Pulse: 98   Resp: 16   SpO2: 98%   Weight: 60.3 kg (133 lb)   Height: 162.6 cm (64\")   PainSc: 10-Worst pain ever       Urine Drug Screen: 6/7/2021  Appropriate: Pending    Objective   Physical Exam  Vitals and nursing note reviewed.   Constitutional:       General: She is not in acute distress.     Appearance: Normal appearance. She is normal weight.   Musculoskeletal:         General: Swelling and tenderness present.      Comments: Left lower extremity:  1.  Diffusely tender to even light palpation with signs of allodynia in the left leg and left foot  2.  Swelling present in the left foot compared to the right foot  3.  Slight decreased temperature in the left leg compared to the right leg   Skin:     General: Skin is warm and dry.   Neurological:      Mental Status: She is alert.           Assessment/Plan   Problems Addressed this Visit     None      Visit Diagnoses     High risk medication use    -  Primary    Relevant Orders    Urine Drug Screen - Urine, Clean Catch    Complex regional pain syndrome type 1 of left lower extremity          Diagnoses       Codes Comments    High risk medication use    -  Primary ICD-10-CM: Z79.899  ICD-9-CM: V58.69     Complex regional pain syndrome type 1 of left lower extremity     ICD-10-CM: G90.522  ICD-9-CM: 337.22           Plan:  1. I feel that she does have a component of cancer-related pain however I feel the majority of her pain is likely CRPS of the left lower extremity.  The etiology is somewhat uncertain, but may have to do with her chronic cancer pain  2. She is unable to tolerate gabapentin, will start Elavil 50 mg nightly  3. I will also start her on oxycodone 10 mg twice daily  4. UDS and contract today  5. Inspect appropriate  6. Plan for left-sided lumbar " sympathetic block CRPS  --- Follow-up next available for lumbar sympathetic           INSPECT REPORT    As part of the patient's treatment plan, I may be prescribing controlled substances. The patient has been made aware of appropriate use of such medications, including potential risk of somnolence, limited ability to drive and/or work safely, and the potential for dependence or overdose. It has also bee made clear that these medications are for use by this patient only, without concomitant use of alcohol or other substances unless prescribed.     Patient has completed prescribing agreement detailing terms of continued prescribing of controlled substances, including monitoring SMITH reports, urine drug screening, and pill counts if necessary. The patient is aware that inappropriate use will results in cessation of prescribing such medications.    INSPECT report has been reviewed and scanned into the patient's chart.    As the clinician, I personally reviewed the INSPECT from 6/4/2021.    History and physical exam exhibit continued safe and appropriate use of controlled substances.      EMR Dragon/Transcription disclaimer:   Much of this encounter note is an electronic transcription/translation of spoken language to printed text. The electronic translation of spoken language may permit erroneous, or at times, nonsensical words or phrases to be inadvertently transcribed; Although I have reviewed the note for such errors, some may still exist.

## 2021-06-16 ENCOUNTER — HOSPITAL ENCOUNTER (OUTPATIENT)
Dept: PAIN MEDICINE | Facility: HOSPITAL | Age: 46
Discharge: HOME | End: 2021-06-16

## 2021-06-16 VITALS
TEMPERATURE: 97.7 F | WEIGHT: 133 LBS | OXYGEN SATURATION: 99 % | RESPIRATION RATE: 16 BRPM | BODY MASS INDEX: 22.71 KG/M2 | HEIGHT: 64 IN | HEART RATE: 79 BPM | SYSTOLIC BLOOD PRESSURE: 142 MMHG | DIASTOLIC BLOOD PRESSURE: 85 MMHG

## 2021-06-16 DIAGNOSIS — R52 PAIN: ICD-10-CM

## 2021-06-16 DIAGNOSIS — M79.605 LEFT LEG PAIN: Primary | ICD-10-CM

## 2021-06-16 PROCEDURE — 0 IOPAMIDOL 41 % SOLUTION: Performed by: STUDENT IN AN ORGANIZED HEALTH CARE EDUCATION/TRAINING PROGRAM

## 2021-06-16 PROCEDURE — 25010000002 CLONIDINE PER 1 MG

## 2021-06-16 PROCEDURE — 25010000002 DEXAMETHASONE SODIUM PHOSPHATE 10 MG/ML SOLUTION: Performed by: STUDENT IN AN ORGANIZED HEALTH CARE EDUCATION/TRAINING PROGRAM

## 2021-06-16 PROCEDURE — 77003 FLUOROGUIDE FOR SPINE INJECT: CPT

## 2021-06-16 PROCEDURE — 64520 N BLOCK LUMBAR/THORACIC: CPT | Performed by: STUDENT IN AN ORGANIZED HEALTH CARE EDUCATION/TRAINING PROGRAM

## 2021-06-16 RX ORDER — BUPIVACAINE HYDROCHLORIDE 2.5 MG/ML
10 INJECTION, SOLUTION EPIDURAL; INFILTRATION; INTRACAUDAL ONCE
Status: COMPLETED | OUTPATIENT
Start: 2021-06-16 | End: 2021-06-16

## 2021-06-16 RX ORDER — SODIUM CHLORIDE 9 MG/ML
50 INJECTION, SOLUTION INTRAVENOUS CONTINUOUS
Status: DISCONTINUED | OUTPATIENT
Start: 2021-06-16 | End: 2021-06-17 | Stop reason: HOSPADM

## 2021-06-16 RX ORDER — SODIUM CHLORIDE 9 MG/ML
50 INJECTION, SOLUTION INTRAVENOUS ONCE
Status: COMPLETED | OUTPATIENT
Start: 2021-06-16 | End: 2021-06-16

## 2021-06-16 RX ORDER — DEXAMETHASONE SODIUM PHOSPHATE 10 MG/ML
10 INJECTION, SOLUTION INTRAMUSCULAR; INTRAVENOUS ONCE
Status: COMPLETED | OUTPATIENT
Start: 2021-06-16 | End: 2021-06-16

## 2021-06-16 RX ORDER — CLONIDINE 100 UG/ML
100 INJECTION, SOLUTION EPIDURAL ONCE
Status: COMPLETED | OUTPATIENT
Start: 2021-06-16 | End: 2021-06-16

## 2021-06-16 RX ORDER — CLONIDINE 100 UG/ML
INJECTION, SOLUTION EPIDURAL
Status: COMPLETED
Start: 2021-06-16 | End: 2021-06-16

## 2021-06-16 RX ADMIN — CLONIDINE HYDROCHLORIDE 100 MCG: 100 INJECTION, SOLUTION INTRAVENOUS at 10:09

## 2021-06-16 RX ADMIN — IOPAMIDOL 2 ML: 408 INJECTION, SOLUTION INTRATHECAL at 10:11

## 2021-06-16 RX ADMIN — BUPIVACAINE HYDROCHLORIDE 8 ML: 2.5 INJECTION, SOLUTION EPIDURAL; INFILTRATION; INTRACAUDAL; PERINEURAL at 10:10

## 2021-06-16 RX ADMIN — DEXAMETHASONE SODIUM PHOSPHATE 10 MG: 10 INJECTION, SOLUTION INTRAMUSCULAR; INTRAVENOUS at 10:10

## 2021-06-16 RX ADMIN — CLONIDINE 100 MCG: 100 INJECTION, SOLUTION EPIDURAL at 10:09

## 2021-06-16 RX ADMIN — SODIUM CHLORIDE 50 ML/HR: 0.9 INJECTION, SOLUTION INTRAVENOUS at 09:14

## 2021-06-16 NOTE — PROCEDURES
Lumbar Sympathetic Blockade - left  [facility]    PREOPERATIVE DIAGNOSIS:    CRPS Type I of the  Left Lower Extremity    POSTOPERATIVE DIAGNOSIS: Same as preoperative dx    PROCEDURE:  Lumbar Sympathetic Block, left, with fluoroscopy                             Needle entry at: L3,  • CPT 34335, Lumbar Sympathetic Blockade    PRE-PROCEDURE DISCUSSION WITH PATIENT:    Risks and complications were discussed with the patient prior to starting the procedure and informed consent was obtained.   We discussed various topics including but not limited to bleeding, infection, injury, nerve injury, paralysis, coma, death, postprocedural painful flare-up, temporary entire leg weakness, numbness, temperature change, postprocedural site soreness, and a lack of pain relief.  We discussed the diagnostic aspect of lumbar sympathetic nerve blockade.    SURGEON:  Kishore Chatterjee MD    REASON FOR PROCEDURE:    CRPS of the left lower extremity     SEDATION:  Patient declined administration of moderate sedation      LOCAL ANESTHETIC: Marcaine 0.25%  STEROID:   Dexamethasone 10mg   TOTAL VOLUME OF SOLUTION:  10 ml    DESCRIPTON OF PROCEDURE:    After obtaining informed consent, I.V. was started in the preop area.   The patient was taken to the operating room and placed in the prone position. EKG, blood pressure, and pulse oximeter were monitored throughout, and sedation was provided as needed by the RN under my guidance. All pressure points were well padded.      Starting in the oblique view toward the appropriate side, the transverse process of the appropriate level was identified. At the injection level, the tip of the transverse process was overlying the anterolateral margin of the vertebral body. The skin and subcutaneous tissue was anesthetized with 1% lidocaine just cephalad and anterolateral to the verebral body. A 22-gauge spinal needle was introduced cephalad to the transverse process and under fluorocopic guidance  with serial images every 1 cm until the needle tip gently contacted the vertebral body. Under lateral view, the needle was gently advanced anteriorly until the tip was over the anteiror one-third of the vertebral body. Needle position was verified in the AP fluoroscopic view with the needle tip lying medial to the lateral margin of the vertebral body. Aspiration was verified to be negative. 1 ml of omnipaque was injected under real time fluoroscopy, in both the lateral and AP demension, to verify lack of vascular uptake and appropriate spread cephalad and caudal. After appropriate spread was confirmed, the local anesthetic solution with steroid was injected with aspiration every couple ml. and and including 1cc of IsoVue and 100mcg of clonidine The needle was then removed intact.  Vital signs remained stable throughout.      ESTIMATED BLOOD LOSS:  <5 mL  SPECIMENS:  None    COMPLICATIONS: No complications were noted., There was no indication of vascular uptake on live injection of contrast dye., There was no indication of intrathecal uptake on live injection of contrast dye. and There was not any evidence of dural puncture.      TOLERANCE & DISCHARGE CONDITION:    The patient tolerated the procedure well.  The patient was transported to the recovery area without difficulties.  The patient was monitored for at least 30 minutes after the procedure, and temperature increase in the ipsilateral upper extremity was noted.  The patient was discharged to home under the care of family in stable and satisfactory condition.    PLAN OF CARE:  1. The patient was given our standard instruction sheet.  2. The patient will Return to clinic 3-4 wks.    3. The patient will resume all medications as per the medication reconciliation sheet.

## 2021-06-16 NOTE — DISCHARGE INSTRUCTIONS
Sympathetic Nerve Block, Care After  These instructions provide you with information about caring for yourself after your procedure. Your health care provider may also give you more specific instructions. Your treatment has been planned according to current medical practices, but problems sometimes occur. Call your health care provider if you have any problems or questions after your procedure.  What can I expect after the procedure?  After your procedure, it is common for the area where the medicine was injected to be:  · Sore.  · Warm.  · Weak.  · Numb.  If the injection was made in your neck, you may also have:  · Voice changes.  · A droopy eyelid.  · Trouble swallowing.  · A stuffy nose.    Follow these instructions at home:  · For the first 24 hours after your procedure:  ? Do not drive.  ? Rest.  ? Avoid activities that require a lot of energy.  · Keep track of the amount of pain relief that you feel and how long it lasts.  · Do not apply heat near or over the injection sites.  · Do not take a bath or soak in water, such as in a pool or lake, until your health care provider approves.  · Take over-the-counter and prescription medicines only as told by your health care provider.  · If you have trouble swallowing, take small bites when eating and small sips of water when drinking until you are able to swallow normally.  · Keep all follow-up visits as told by your health care provider. This is important.  Contact a health care provider if:  · You have numbness that lasts longer than 8 hours.  · You continue to have pain for more than 24 hours after your procedure.  · You have worsening pain or swelling around an injection site.  · There are red streaks around an injection site.  Get help right away if:  · You cannot swallow.  · You have chest pain.  · You have trouble breathing.  This information is not intended to replace advice given to you by your health care provider. Make sure you discuss any questions you  have with your health care provider.  Document Revised: 01/02/2019 Document Reviewed: 04/13/2017  Elsevier Patient Education © 2021 [employer]

## 2021-06-17 ENCOUNTER — TELEPHONE (OUTPATIENT)
Dept: PAIN MEDICINE | Facility: HOSPITAL | Age: 46
End: 2021-06-17

## 2021-06-18 ENCOUNTER — TELEPHONE (OUTPATIENT)
Dept: ONCOLOGY | Facility: CLINIC | Age: 46
End: 2021-06-18

## 2021-06-18 ENCOUNTER — MEDICATION THERAPY MANAGEMENT (OUTPATIENT)
Dept: PHARMACY | Facility: HOSPITAL | Age: 46
End: 2021-06-18

## 2021-06-18 NOTE — PROGRESS NOTES
MTM Note: Aram    Attempted to reach Hope to discuss scheduling for start of oral chemo. NO answer.  Call back [phone].

## 2021-07-08 ENCOUNTER — TELEPHONE (OUTPATIENT)
Dept: ONCOLOGY | Facility: HOSPITAL | Age: 46
End: 2021-07-08

## 2021-07-08 NOTE — TELEPHONE ENCOUNTER
Case Management/ Note    Patient Name: Nieves Mc  YOB: 1975  MRN #: [id number]    OSW called patient regarding missed appointments. Left a message requesting call back.     Electronically signed by:   Jennifer Gupta LCSW, OSW-C  07/08/21, 15:46 EDT

## 2021-07-19 ENCOUNTER — OFFICE VISIT (OUTPATIENT)
Dept: PAIN MEDICINE | Facility: CLINIC | Age: 46
End: 2021-07-19

## 2021-07-19 VITALS
WEIGHT: 133 LBS | SYSTOLIC BLOOD PRESSURE: 119 MMHG | HEIGHT: 64 IN | BODY MASS INDEX: 22.71 KG/M2 | DIASTOLIC BLOOD PRESSURE: 78 MMHG | OXYGEN SATURATION: 99 % | HEART RATE: 103 BPM | RESPIRATION RATE: 16 BRPM

## 2021-07-19 DIAGNOSIS — G90.522 COMPLEX REGIONAL PAIN SYNDROME TYPE 1 OF LEFT LOWER EXTREMITY: Primary | ICD-10-CM

## 2021-07-19 PROCEDURE — 99214 OFFICE O/P EST MOD 30 MIN: CPT | Performed by: STUDENT IN AN ORGANIZED HEALTH CARE EDUCATION/TRAINING PROGRAM

## 2021-07-19 RX ORDER — OXYCODONE AND ACETAMINOPHEN 10; 325 MG/1; MG/1
1 TABLET ORAL 2 TIMES DAILY PRN
Qty: 60 TABLET | Refills: 0 | Status: SHIPPED | OUTPATIENT
Start: 2021-07-19 | End: 2021-09-15 | Stop reason: SDUPTHER

## 2021-07-19 RX ORDER — AMITRIPTYLINE HYDROCHLORIDE 50 MG/1
50 TABLET, FILM COATED ORAL NIGHTLY
Qty: 30 TABLET | Refills: 2 | Status: SHIPPED | OUTPATIENT
Start: 2021-07-19 | End: 2021-09-15 | Stop reason: SDUPTHER

## 2021-07-19 NOTE — PROGRESS NOTES
CHIEF COMPLAINT  Chief Complaint   Patient presents with   • Extremity Pain     Left leg pain Oxycodone LD 7/19@0840       Primary Care  Provider, No Known    Subjective   Hope NICHOLAS Mc is a 45 y.o. female  who presents for cancer-related pain and left lower extremity allodynia.  She states that she is having progressively worsening pain for many years following the diagnosis of CML.  She has been previously treated with over-the-counter medications which are no longer effective.  She states in the past, she had taken some hydrocodone which was also not effective.  She is unable to tolerate gabapentin due to side effects.  In addition to describing generalized leg and body aches from her cancer, she also describes allodynia-like symptoms in the left leg.  She states is difficult for her to wear shoes due to significant pain in the left foot.  She also describes pain with light touch of the left lower extremity.  She endorses swelling in the left lower extremity as well as changing of temperature left lower extremity.  She also has significant trouble walking and some weakness    Extremity Pain   Associated symptoms include numbness.        Location: Whole body, left lower extremity  Onset: Years ago  Duration: Progressively worsening  Timing: Constant throughout the day  Quality: Aching sharp pains in the left lower extremity  Severity: Today: 3       Last Week: 3       Worst: 10  Modifying Factors: The pain is worse with any type of movement and physical activity.  Pain is also exacerbated by light touch of the left lower extremity    Physical Therapy: no    Interval Update 07/19/2021: She states 100% pain relief for 3 to 4 days following the lumbar sympathetic block with a return of symptoms.  Otherwise, no new issues.    The following portions of the patient's history were reviewed and updated as appropriate: allergies, current medications, past family history, past medical history, past social history, past  surgical history and problem list.      Current Outpatient Medications:   •  Accu-Chek Softclix Lancets lancets, 1 each by Other route 3 (Three) Times a Day Before Meals. Dx: E11.65. Use as instructed, Disp: 100 each, Rfl: 2  •  allopurinol (ZYLOPRIM) 300 MG tablet, Take 1 tablet by mouth Every Night., Disp: 30 tablet, Rfl: 0  •  hydroxyurea (HYDREA) 500 MG capsule, Take 2 capsules by mouth 2 (Two) Times a Day., Disp: 60 capsule, Rfl: 0  •  insulin aspart (NovoLOG FlexPen) 100 UNIT/ML solution pen-injector sc pen, NOVOLOG FLEXPEN 100 UNIT/ML SOPN, Disp: , Rfl:   •  Insulin Glargine (LANTUS SOLOSTAR) 100 UNIT/ML injection pen, Inject 20 Units under the skin into the appropriate area as directed Every Night., Disp: 5 pen, Rfl: 3  •  Insulin Pen Needle (Pen Needles) 32G X 4 MM misc, 1 each Daily., Disp: 100 each, Rfl: 2  •  oxyCODONE-acetaminophen (PERCOCET)  MG per tablet, Take 1 tablet by mouth 2 (Two) Times a Day As Needed for Moderate Pain ., Disp: 60 tablet, Rfl: 0  •  Accu-Chek Guide test strip, 1 each by Other route 3 (Three) Times a Day Before Meals. Dx: E11.65. Use as instructed, Disp: 100 each, Rfl: 2  •  amitriptyline (ELAVIL) 50 MG tablet, Take 1 tablet by mouth Every Night., Disp: 30 tablet, Rfl: 2  •  ferrous sulfate 324 (65 Fe) MG tablet delayed-release EC tablet, Take 1 tablet by mouth Daily With Breakfast., Disp: 30 tablet, Rfl: 3  •  nilotinib (Tasigna) 150 MG capsule capsule, Take 2 capsules by mouth Take As Directed. Take 2 capsules (300mg) by mouth every 12 hours., Disp: 120 capsule, Rfl: 6  •  ondansetron (ZOFRAN) 4 MG tablet, Take 1 tablet by mouth Every 6 (Six) Hours As Needed for Nausea or Vomiting., Disp: 39 tablet, Rfl: 0  •  potassium chloride (K-DUR,KLOR-CON) 20 MEQ CR tablet, Take 2 tablets by mouth Daily., Disp: 10 tablet, Rfl: 0  •  prochlorperazine (COMPAZINE) 10 MG tablet, Take 1 tablet by mouth Every 4 (Four) Hours As Needed for Nausea or Vomiting., Disp: 30 tablet, Rfl:  "5    Review of Systems   Constitutional: Positive for fatigue.   Musculoskeletal: Positive for arthralgias and gait problem.   Neurological: Positive for weakness and numbness.       Vitals:    07/19/21 0909   BP: 119/78   Pulse: 103   Resp: 16   SpO2: 99%   Weight: 60.3 kg (133 lb)   Height: 162.6 cm (64\")   PainSc:   3       Urine Drug Screen: 6/7/2021  Appropriate: Yes    Objective   Physical Exam  Vitals and nursing note reviewed.   Constitutional:       General: She is not in acute distress.     Appearance: Normal appearance. She is normal weight.   Musculoskeletal:         General: Swelling and tenderness present.      Comments: Left lower extremity:  1.  Diffusely tender to even light palpation with signs of allodynia in the left leg and left foot  2.  Swelling present in the left foot compared to the right foot  3.  Slight decreased temperature in the left leg compared to the right leg   Skin:     General: Skin is warm and dry.   Neurological:      Mental Status: She is alert.           Assessment/Plan   Problems Addressed this Visit     None      Visit Diagnoses     Complex regional pain syndrome type 1 of left lower extremity    -  Primary    Relevant Medications    oxyCODONE-acetaminophen (PERCOCET)  MG per tablet      Diagnoses       Codes Comments    Complex regional pain syndrome type 1 of left lower extremity    -  Primary ICD-10-CM: G90.522  ICD-9-CM: 337.22           Plan:  1. Excellent pain relief with previous left-sided lumbar sympathetic block  2. We will plan to repeat lumbar sympathetic block.  If again she only gets several days relief, will plan for spinal cord stimulator trial  3. Continue Elavil 50 mg nightly for neuropathic pain  4. Continue oxycodone 10 mg twice daily as needed  --- Follow-up next available for lumbar sympathetic           INSPECT REPORT    As part of the patient's treatment plan, I may be prescribing controlled substances. The patient has been made aware of " appropriate use of such medications, including potential risk of somnolence, limited ability to drive and/or work safely, and the potential for dependence or overdose. It has also bee made clear that these medications are for use by this patient only, without concomitant use of alcohol or other substances unless prescribed.     Patient has completed prescribing agreement detailing terms of continued prescribing of controlled substances, including monitoring SMITH reports, urine drug screening, and pill counts if necessary. The patient is aware that inappropriate use will results in cessation of prescribing such medications.    INSPECT report has been reviewed and scanned into the patient's chart.    As the clinician, I personally reviewed the INSPECT from 7/16/2021.    History and physical exam exhibit continued safe and appropriate use of controlled substances.      EMR Dragon/Transcription disclaimer:   Much of this encounter note is an electronic transcription/translation of spoken language to printed text. The electronic translation of spoken language may permit erroneous, or at times, nonsensical words or phrases to be inadvertently transcribed; Although I have reviewed the note for such errors, some may still exist.

## 2021-09-15 ENCOUNTER — TELEPHONE (OUTPATIENT)
Dept: PAIN MEDICINE | Facility: CLINIC | Age: 46
End: 2021-09-15

## 2021-09-15 DIAGNOSIS — G90.522 COMPLEX REGIONAL PAIN SYNDROME TYPE 1 OF LEFT LOWER EXTREMITY: ICD-10-CM

## 2021-09-15 RX ORDER — AMITRIPTYLINE HYDROCHLORIDE 50 MG/1
50 TABLET, FILM COATED ORAL NIGHTLY
Qty: 30 TABLET | Refills: 2 | Status: SHIPPED | OUTPATIENT
Start: 2021-09-15 | End: 2021-10-18

## 2021-09-15 RX ORDER — OXYCODONE AND ACETAMINOPHEN 10; 325 MG/1; MG/1
1 TABLET ORAL 2 TIMES DAILY PRN
Qty: 60 TABLET | Refills: 0 | Status: SHIPPED | OUTPATIENT
Start: 2021-09-15 | End: 2021-10-18 | Stop reason: SDUPTHER

## 2021-09-15 NOTE — TELEPHONE ENCOUNTER
Caller: Nieves Mc A    Relationship to patient: Self    Best call back number: [phone]    Type of visit: INJECTION    Requested date: N/A    Additional notes: PATIENT WAS SICK AND MISSED LAST APPT FOR INJECTION ON 7/28/21. SHE IS CALLING IN TO RESCHEDULE. SHE IS ALSO ASKING ONCE SCHEDULED, IF SHE COULD GET A REFILL ON OXYCODONE AND NEVER MEDICATION. HER PREFERRED PHARMACY IS [facility] IN [place]

## 2021-09-22 ENCOUNTER — HOSPITAL ENCOUNTER (OUTPATIENT)
Dept: PAIN MEDICINE | Facility: HOSPITAL | Age: 46
Discharge: HOME | End: 2021-09-22

## 2021-09-22 VITALS
HEIGHT: 64 IN | BODY MASS INDEX: 22.71 KG/M2 | DIASTOLIC BLOOD PRESSURE: 93 MMHG | RESPIRATION RATE: 16 BRPM | SYSTOLIC BLOOD PRESSURE: 133 MMHG | TEMPERATURE: 97.8 F | HEART RATE: 111 BPM | OXYGEN SATURATION: 98 % | WEIGHT: 133 LBS

## 2021-09-22 DIAGNOSIS — R52 PAIN: ICD-10-CM

## 2021-09-22 DIAGNOSIS — G90.522 COMPLEX REGIONAL PAIN SYNDROME TYPE 1 OF LEFT LOWER EXTREMITY: Primary | ICD-10-CM

## 2021-09-22 PROCEDURE — 77003 FLUOROGUIDE FOR SPINE INJECT: CPT

## 2021-09-22 PROCEDURE — 64520 N BLOCK LUMBAR/THORACIC: CPT | Performed by: STUDENT IN AN ORGANIZED HEALTH CARE EDUCATION/TRAINING PROGRAM

## 2021-09-22 PROCEDURE — 0 IOPAMIDOL 41 % SOLUTION: Performed by: STUDENT IN AN ORGANIZED HEALTH CARE EDUCATION/TRAINING PROGRAM

## 2021-09-22 PROCEDURE — 25010000002 DEXAMETHASONE SODIUM PHOSPHATE 10 MG/ML SOLUTION: Performed by: STUDENT IN AN ORGANIZED HEALTH CARE EDUCATION/TRAINING PROGRAM

## 2021-09-22 RX ORDER — DEXAMETHASONE SODIUM PHOSPHATE 10 MG/ML
10 INJECTION, SOLUTION INTRAMUSCULAR; INTRAVENOUS ONCE
Status: COMPLETED | OUTPATIENT
Start: 2021-09-22 | End: 2021-09-22

## 2021-09-22 RX ORDER — BUPIVACAINE HYDROCHLORIDE 2.5 MG/ML
10 INJECTION, SOLUTION EPIDURAL; INFILTRATION; INTRACAUDAL ONCE
Status: COMPLETED | OUTPATIENT
Start: 2021-09-22 | End: 2021-09-22

## 2021-09-22 RX ADMIN — BUPIVACAINE HYDROCHLORIDE 10 ML: 2.5 INJECTION, SOLUTION EPIDURAL; INFILTRATION; INTRACAUDAL; PERINEURAL at 10:39

## 2021-09-22 RX ADMIN — IOPAMIDOL 3 ML: 408 INJECTION, SOLUTION INTRATHECAL at 10:38

## 2021-09-22 RX ADMIN — DEXAMETHASONE SODIUM PHOSPHATE 10 MG: 10 INJECTION, SOLUTION INTRAMUSCULAR; INTRAVENOUS at 10:39

## 2021-09-22 NOTE — PROCEDURES
Lumbar Sympathetic Blockade - left  [facility]    PREOPERATIVE DIAGNOSIS:    CRPS Type I of the  Left Lower Extremity    POSTOPERATIVE DIAGNOSIS: Same as preoperative dx    PROCEDURE:  Lumbar Sympathetic Block, left, with fluoroscopy                             Needle entry at: L3,  • CPT 28303, Lumbar Sympathetic Blockade    PRE-PROCEDURE DISCUSSION WITH PATIENT:    Risks and complications were discussed with the patient prior to starting the procedure and informed consent was obtained.   We discussed various topics including but not limited to bleeding, infection, injury, nerve injury, paralysis, coma, death, postprocedural painful flare-up, temporary entire leg weakness, numbness, temperature change, postprocedural site soreness, and a lack of pain relief.  We discussed the diagnostic aspect of lumbar sympathetic nerve blockade.    SURGEON:  Kishore Chatterjee MD    REASON FOR PROCEDURE:    CRPS of the left lower extremity     SEDATION:  Patient declined administration of moderate sedation      LOCAL ANESTHETIC: Marcaine 0.25%  STEROID:   Dexamethasone 10mg   TOTAL VOLUME OF SOLUTION:  10 ml    DESCRIPTON OF PROCEDURE:    After obtaining informed consent, I.V. was started in the preop area.   The patient was taken to the operating room and placed in the prone position. EKG, blood pressure, and pulse oximeter were monitored throughout, and sedation was provided as needed by the RN under my guidance. All pressure points were well padded.      Starting in the oblique view toward the appropriate side, the transverse process of the appropriate level was identified. At the injection level, the tip of the transverse process was overlying the anterolateral margin of the vertebral body. The skin and subcutaneous tissue was anesthetized with 1% lidocaine just cephalad and anterolateral to the verebral body. A 22-gauge spinal needle was introduced cephalad to the transverse process and under fluorocopic guidance  with serial images every 1 cm until the needle tip gently contacted the vertebral body. Under lateral view, the needle was gently advanced anteriorly until the tip was over the anteiror one-third of the vertebral body. Needle position was verified in the AP fluoroscopic view with the needle tip lying medial to the lateral margin of the vertebral body. Aspiration was verified to be negative. 1 ml of omnipaque was injected under real time fluoroscopy, in both the lateral and AP demension, to verify lack of vascular uptake and appropriate spread cephalad and caudal. After appropriate spread was confirmed, the local anesthetic solution with steroid was injected with aspiration every couple ml. and and including 1cc of IsoVue The needle was then removed intact.  Vital signs remained stable throughout.      ESTIMATED BLOOD LOSS:  <5 mL  SPECIMENS:  None    COMPLICATIONS: No complications were noted., There was no indication of vascular uptake on live injection of contrast dye., There was no indication of intrathecal uptake on live injection of contrast dye. and There was not any evidence of dural puncture.      TOLERANCE & DISCHARGE CONDITION:    The patient tolerated the procedure well.  The patient was transported to the recovery area without difficulties.  The patient was monitored for at least 30 minutes after the procedure, and temperature increase in the ipsilateral upper extremity was noted.  The patient was discharged to home under the care of family in stable and satisfactory condition.    PLAN OF CARE:  1. The patient was given our standard instruction sheet.  2. The patient will Return to clinic 3-4 wks.    3. The patient will resume all medications as per the medication reconciliation sheet.

## 2021-09-22 NOTE — DISCHARGE INSTRUCTIONS
Peripheral Nerve Block    Peripheral nerve block is an injection of numbing medicine (regional anesthetic) near a nerve. The regional anesthetic numbs everything below the injection site. This provides pain relief during and after a medical procedure.  Generally, you will be awake while a peripheral nerve block is performed. You also may receive medicines to help you feel relaxed and comfortable during the procedure (sedatives).  When you have a peripheral nerve block, you are not exposed to the risks associated with medicine that makes you fall asleep (general anesthetic). You may also:  · Need less pain medicine after your procedure.  · Have a lower risk of blood clots.  · Recover sooner.  Tell a health care provider about:  · Any allergies you have.  · All medicines you are taking, including vitamins, herbs, eye drops, creams, and over-the-counter medicines.  · Any problems you or family members have had with anesthetic medicines.  · Any blood disorders you have.  · Any surgeries you have.  · Any medical conditions you have.  · Whether you are pregnant or may be pregnant.  What are the risks?  Generally, this is a safe procedure. However, problems may occur, including:  · Infection.  · Bleeding.  · Allergic reactions to medicines.  · Damage to other structures or organs, such as temporary or permanent nerve damage.  What happens before the procedure?  Staying hydrated  Follow instructions from your health care provider about hydration, which may include:  · Up to 2 hours before the procedure - you may continue to drink clear liquids, such as water, clear fruit juice, black coffee, and plain tea.  Eating and drinking restrictions  Follow instructions from your health care provider about eating and drinking, which may include:  · 8 hours before the procedure - stop eating heavy meals or foods such as meat, fried foods, or fatty foods.  · 6 hours before the procedure - stop eating light meals or foods, such as toast  or cereal.  · 6 hours before the procedure - stop drinking milk or drinks that contain milk.  · 2 hours before the procedure - stop drinking clear liquids.  General instructions  · Ask your health care provider about:  ? Changing or stopping your regular medicines. This is especially important if you are taking diabetes medicines or blood thinners.  ? Taking medicines such as aspirin and ibuprofen. These medicines can thin your blood. Do not take these medicines unless your health care provider tells you to take them.  ? Taking over-the-counter medicines, vitamins, herbs, and supplements.  · Plan to have someone take you home from the hospital or clinic.  · Plan to have a responsible adult care for you for at least 24 hours after you leave the hospital or clinic. This is important.  What happens during the procedure?    · An IV will be inserted into one of your veins.  · You may be given a sedative.  · Your nerve may be located by using:  ? Sound waves that create images of the area (ultrasound).  ? A device that activates the nerve and causes your muscles to twitch (nerve stimulator).  · The skin around your injection site will be cleaned with a germ-killing solution.  · Medicine to numb your injection site (local anesthetic) may be injected into the tissue above your nerve.  · Regional anesthetic will be injected into the area near your nerve.  ? The medicine will be injected around the nerve, not into it.  ? You should not feel any pain. The area of your peripheral nerve block will begin to feel warm and numb.  · A thin, flexible tube (catheter) may be inserted near your nerve. The catheter may remain there to continue delivering the regional anesthetic during and after your medical procedure.  · When the area of your peripheral nerve block is completely numb, the medical procedure can be performed.  · Your injection site may be covered with a bandage (dressing) when your medical procedure is done.  The procedure  may vary among health care providers and hospitals.  What can I expect after the procedure?  · If you do not have a catheter, you may continue to be numb for up to 36 hours. The length of this time depends on how much anesthetic was injected.  · If you have a catheter, you will continue to be numb until the catheter is removed.  · To reduce your risk of injury:  ? Do not expose the numb area to heat or cold.  ? Do not stand up or try to walk without help if you have a nerve block in one or both legs. Get help and limit your activity as told by your health care provider.  · As the medicine wears off, you will have a gradual return of feeling in the area that is supplied by the nerve.  · Your blood pressure, heart rate, breathing rate, and blood oxygen level will be monitored until the medicines you were given have worn off.  Follow these instructions at home:  Activity  · Do not drive or use heavy machinery until your health care provider approves.  · Do not lift anything that is heavier than 10 lb (4.5 kg), or the limit that you are told, until your health care provider says that it is safe.  Injection site care  · If you have a dressing, remove it 24 hours after your procedure, or as told by your health care provider.  · Check your injection site every day for signs of infection. Check for:  ? Redness, swelling, or pain.  ? Warmth.  ? Fluid or blood.  ? Pus or a bad smell.  General instructions  · Take over-the-counter and prescription medicines only as told by your health care provider.  · Do not take showers or baths, swim, or use a hot tub until your health care provider approves.  · Keep all follow-up visits as told by your health care provider. This is important.  Contact a health care provider if:  · You continue to have numbness, weakness, or tingling after your medicine has worn off.  · You have a fever.  · You have redness, swelling, or pain around your injection site.  Get help right away if:  · You have  trouble breathing.  Summary  · Peripheral nerve block is an injection of numbing medicine (regional anesthetic) near a nerve. This provides pain relief during and after a medical procedure.  · Feeling will gradually return to the area that is supplied by the nerve.  · A catheter may be inserted to continue to provide medicine for up to several days.  This information is not intended to replace advice given to you by your health care provider. Make sure you discuss any questions you have with your health care provider.  Document Revised: 02/03/2020 Document Reviewed: 10/10/2018  Elsevier Patient Education © 2021 [employer]

## 2021-09-28 ENCOUNTER — OFFICE VISIT (OUTPATIENT)
Dept: ENDOCRINOLOGY | Facility: CLINIC | Age: 46
End: 2021-09-28

## 2021-09-28 VITALS
SYSTOLIC BLOOD PRESSURE: 120 MMHG | HEIGHT: 64 IN | HEART RATE: 108 BPM | TEMPERATURE: 97.3 F | DIASTOLIC BLOOD PRESSURE: 70 MMHG | WEIGHT: 130.8 LBS | BODY MASS INDEX: 22.33 KG/M2

## 2021-09-28 DIAGNOSIS — Z79.4 TYPE 2 DIABETES MELLITUS WITH DIABETIC POLYNEUROPATHY, WITH LONG-TERM CURRENT USE OF INSULIN (HCC): Primary | ICD-10-CM

## 2021-09-28 DIAGNOSIS — E11.42 TYPE 2 DIABETES MELLITUS WITH DIABETIC POLYNEUROPATHY, WITH LONG-TERM CURRENT USE OF INSULIN (HCC): Primary | ICD-10-CM

## 2021-09-28 DIAGNOSIS — E55.9 VITAMIN D DEFICIENCY: ICD-10-CM

## 2021-09-28 DIAGNOSIS — E78.5 DYSLIPIDEMIA: ICD-10-CM

## 2021-09-28 LAB — GLUCOSE BLDC GLUCOMTR-MCNC: 347 MG/DL (ref 70–105)

## 2021-09-28 PROCEDURE — 82962 GLUCOSE BLOOD TEST: CPT | Performed by: INTERNAL MEDICINE

## 2021-09-28 PROCEDURE — 99204 OFFICE O/P NEW MOD 45 MIN: CPT | Performed by: INTERNAL MEDICINE

## 2021-09-28 RX ORDER — INSULIN ASPART 100 [IU]/ML
INJECTION, SOLUTION INTRAVENOUS; SUBCUTANEOUS
Qty: 15 ML | Refills: 11 | Status: SHIPPED | OUTPATIENT
Start: 2021-09-28 | End: 2021-10-17

## 2021-09-28 RX ORDER — PEN NEEDLE, DIABETIC 30 GX3/16"
1 NEEDLE, DISPOSABLE MISCELLANEOUS DAILY
Qty: 400 EACH | Refills: 3 | Status: SHIPPED | OUTPATIENT
Start: 2021-09-28 | End: 2022-01-12

## 2021-09-29 DIAGNOSIS — Z79.4 TYPE 2 DIABETES MELLITUS WITH DIABETIC POLYNEUROPATHY, WITH LONG-TERM CURRENT USE OF INSULIN (HCC): Primary | ICD-10-CM

## 2021-09-29 DIAGNOSIS — E11.42 TYPE 2 DIABETES MELLITUS WITH DIABETIC POLYNEUROPATHY, WITH LONG-TERM CURRENT USE OF INSULIN (HCC): Primary | ICD-10-CM

## 2021-09-29 RX ORDER — INSULIN GLARGINE 100 [IU]/ML
20 INJECTION, SOLUTION SUBCUTANEOUS DAILY
Qty: 5 PEN | Refills: 4 | Status: SHIPPED | OUTPATIENT
Start: 2021-09-29 | End: 2021-10-17

## 2021-09-30 ENCOUNTER — PATIENT ROUNDING (BHMG ONLY) (OUTPATIENT)
Dept: ENDOCRINOLOGY | Facility: CLINIC | Age: 46
End: 2021-09-30

## 2021-10-11 ENCOUNTER — TELEPHONE (OUTPATIENT)
Dept: ENDOCRINOLOGY | Facility: CLINIC | Age: 46
End: 2021-10-11

## 2021-10-11 NOTE — TELEPHONE ENCOUNTER
Pt cannot afford insulin. I was given the information to look at the formulary and all insulins are at tier 2 so they would all cost the same. I told patient about patient assistance programs and Relion brand. She is hesitant to use the vial and syringe due to eyesight issues. I told her I would send a message to the education team to see if they had other ideas.

## 2021-10-12 NOTE — TELEPHONE ENCOUNTER
Called pt and emailed her the links to [employer] and [employer]. Educator suggested possibly having someone prefill syringes for her d/t her vision, but pt stated she would not have anyone to help her with that. Pt is to let us know what she decides to do.

## 2021-10-17 RX ORDER — HUMAN INSULIN 100 [IU]/ML
INJECTION, SUSPENSION SUBCUTANEOUS
Qty: 15 ML | Refills: 5 | Status: SHIPPED | OUTPATIENT
Start: 2021-10-17 | End: 2021-12-14 | Stop reason: SDUPTHER

## 2021-10-17 NOTE — PROGRESS NOTES
[facility]    Referring Provider: Meghann Lerma,*  Reason for Consultation: Diabetes evaluation & management.    No care team member to display    Chief complaint Diabetes (New Patient, Type 2,  4hr PP, ketones--unable to give sample)      Subjective .     History of present illness:    This is a  46 y.o. female with type 2 Diabetes diagnosed during routine f/u for cancer Rx for CML.  Started on Lantus insulin. Lost insurance & was off x 2months. Just restarted coverage.  Attended initial diabetes education assessment in May. Did not attend class series due to loss of insurance.  Has a Relion meter, but does not test often . Unable to see well.  Last eye exam on 2021. Seen monthly for injections due to R eye retina hemorrhage.  C/o decreased appetite. Lost 100 lb in the last 6 months.  Not taking vit D.    Review of Systems  Review of Systems   Constitutional: Positive for appetite change and unexpected weight loss.   HENT: Negative for trouble swallowing.    Eyes: Positive for blurred vision.   Cardiovascular: Positive for leg swelling.   Gastrointestinal: Positive for constipation and nausea.   Endocrine: Positive for polydipsia and polyuria.   Neurological: Negative for headache.       History  Past Medical History:   Diagnosis Date   • Bone pain    • Diabetes mellitus (HCC)    • Leg pain     left leg greater   • Leukemia (HCC) 2019   • Leukemia (HCC)    • Migraine    • Pulmonary embolism (HCC)    • Type 2 diabetes mellitus (HCC)      Past Surgical History:   Procedure Laterality Date   • BONE MARROW BIOPSY     • BREAST SURGERY     •  SECTION     • CHOLECYSTECTOMY     • EYE SURGERY      laser surgery due  to hemmorage--- 2021   • SPINE SURGERY      [place]ardi spinal block   • TUBAL ABDOMINAL LIGATION       Family History   Problem Relation Age of Onset   • Diabetes Mother    • Diabetes Maternal Grandmother    • Heart attack Maternal Grandmother    • Stroke  Maternal Grandmother      Social History     Tobacco Use   • Smoking status: Never Smoker   • Smokeless tobacco: Never Used   Vaping Use   • Vaping Use: Never used   Substance Use Topics   • Alcohol use: No   • Drug use: No       Allergies:  Hydromorphone, Morphine, and Propofol    Objective     Vital Signs       Vitals:    09/28/21 0919   BP: 120/70   Pulse: 108   Temp: 97.3 °F (36.3 °C)         Physical Exam:     General Appearance:    Alert, cooperative, in no acute distress   Head:    Normocephalic, without obvious abnormality, atraumatic   Eyes:            Lids and lashes normal, conjunctivae and sclerae normal, no   icterus, no pallor, corneas clear, PERRLA   Throat:   No oral lesions,  oral mucosa moist   Neck:   No adenopathy, supple,  no thyromegaly, no   carotid bruit   Lungs:     Clear     Heart:    Regular rhythm and normal rate   Chest Wall:    No abnormalities observed   Abdomen:     Normal bowel sounds, soft                 Extremities:   Moves all extremities well, no edema               Pulses:   Pulses palpable and equal bilaterally   Skin:   Dry   Neurologic:  DTR absent in ankles, able to feel the 10g monofilament       Results Review  I have reviewed the patient's new clinical results, labs & imaging.    Lab Results (last 24 hours)     ** No results found for the last 24 hours. **        Lab Results   Component Value Date    HGBA1C 11.4 (H) 03/11/2021       Assessment/Plan     1. Diabetes type 2, with hyperglycemia & proliferative retinopathy  2. Dyslipidemia  3. Vitamin D Deficiency    Reschedule class series.  See eye doctor as scheduled.  Restart Lantus insulin 20 units daily.  Novolog 2 units before meals.  Take extra 1 unit for each 50 mg bl sugar over 150.  Call if blood sugars are running under 100 or over 200.  Find a PCP asap.  Given Lantus 20 units & Lyumjev 6 units in clinic. Sent pens home with pt.    Addendum: 10/17/2021  Pt called re insulin cost. Cannot afford.  Insulin changed to  Novolin 70/30 FlexPen Relion 20 units before breakfast, 10 units before supper.    I discussed the patients findings and my recommendations with patient    Syed Calles MD  10/17/21  17:40 EDT

## 2021-10-18 ENCOUNTER — OFFICE VISIT (OUTPATIENT)
Dept: PAIN MEDICINE | Facility: CLINIC | Age: 46
End: 2021-10-18

## 2021-10-18 VITALS
OXYGEN SATURATION: 100 % | SYSTOLIC BLOOD PRESSURE: 130 MMHG | BODY MASS INDEX: 22.2 KG/M2 | RESPIRATION RATE: 16 BRPM | WEIGHT: 130 LBS | DIASTOLIC BLOOD PRESSURE: 80 MMHG | HEART RATE: 111 BPM | HEIGHT: 64 IN

## 2021-10-18 DIAGNOSIS — G90.522 COMPLEX REGIONAL PAIN SYNDROME TYPE 1 OF LEFT LOWER EXTREMITY: ICD-10-CM

## 2021-10-18 PROCEDURE — 99214 OFFICE O/P EST MOD 30 MIN: CPT | Performed by: STUDENT IN AN ORGANIZED HEALTH CARE EDUCATION/TRAINING PROGRAM

## 2021-10-18 RX ORDER — AMITRIPTYLINE HYDROCHLORIDE 50 MG/1
50 TABLET, FILM COATED ORAL NIGHTLY
Qty: 30 TABLET | Refills: 2 | Status: SHIPPED | OUTPATIENT
Start: 2021-10-18 | End: 2022-01-24

## 2021-10-18 RX ORDER — OXYCODONE AND ACETAMINOPHEN 10; 325 MG/1; MG/1
1 TABLET ORAL 2 TIMES DAILY PRN
Qty: 60 TABLET | Refills: 0 | Status: SHIPPED | OUTPATIENT
Start: 2021-10-18 | End: 2021-11-15 | Stop reason: SDUPTHER

## 2021-10-18 NOTE — PROGRESS NOTES
CHIEF COMPLAINT  Chief Complaint   Patient presents with   • Extremity Pain     Carlin. legs pain Oxycodone LD 10/17@6pm       Primary Care  Provider, No Known    Subjective   Hope NICHOLAS Mc is a 46 y.o. female  who presents for cancer-related pain and left lower extremity allodynia.  She states that she is having progressively worsening pain for many years following the diagnosis of CML.  She has been previously treated with over-the-counter medications which are no longer effective.  She states in the past, she had taken some hydrocodone which was also not effective.  She is unable to tolerate gabapentin due to side effects.  In addition to describing generalized leg and body aches from her cancer, she also describes allodynia-like symptoms in the left leg.  She states is difficult for her to wear shoes due to significant pain in the left foot.  She also describes pain with light touch of the left lower extremity.  She endorses swelling in the left lower extremity as well as changing of temperature left lower extremity.  She also has significant trouble walking and some weakness    Extremity Pain   Associated symptoms include numbness.        Location: Whole body, left lower extremity  Onset: Years ago  Duration: Progressively worsening  Timing: Constant throughout the day  Quality: Aching sharp pains in the left lower extremity  Severity: Today: 6       Last Week: 6       Worst: 10  Modifying Factors: The pain is worse with any type of movement and physical activity.  Pain is also exacerbated by light touch of the left lower extremity    Physical Therapy: no    Interval Update 10/18/2021: It appears that she is somewhat better after 2 lumbar sympathetic blocks.  Today, she is complaining of some right-sided pain as well.  Doing well with oxycodone 10 mg twice daily without side effects of sedation constipation.  She is working on getting her diabetes under control but states that she is also losing her  "eyesight.    The following portions of the patient's history were reviewed and updated as appropriate: allergies, current medications, past family history, past medical history, past social history, past surgical history and problem list.      Current Outpatient Medications:   •  Accu-Chek Softclix Lancets lancets, 1 each by Other route 3 (Three) Times a Day Before Meals. Dx: E11.65. Use as instructed, Disp: 100 each, Rfl: 2  •  Insulin NPH Isophane & Regular (NovoLIN 70/30 FlexPen Relion) (70-30) 100 UNIT/ML suspension pen-injector, Inject 20 units ac breakfast, 10 units ac supper., Disp: 15 mL, Rfl: 5  •  Insulin Pen Needle (Pen Needles) 32G X 4 MM misc, 1 each Daily. For use with insulin pens,, Disp: 400 each, Rfl: 3  •  nilotinib (Tasigna) 150 MG capsule capsule, Take 2 capsules by mouth Take As Directed. Take 2 capsules (300mg) by mouth every 12 hours., Disp: 120 capsule, Rfl: 6  •  ondansetron (ZOFRAN) 4 MG tablet, Take 1 tablet by mouth Every 6 (Six) Hours As Needed for Nausea or Vomiting., Disp: 39 tablet, Rfl: 0  •  oxyCODONE-acetaminophen (PERCOCET)  MG per tablet, Take 1 tablet by mouth 2 (Two) Times a Day As Needed for Moderate Pain ., Disp: 60 tablet, Rfl: 0  •  Accu-Chek Guide test strip, 1 each by Other route 3 (Three) Times a Day Before Meals. Dx: E11.65. Use as instructed, Disp: 100 each, Rfl: 2  •  amitriptyline (ELAVIL) 50 MG tablet, Take 1 tablet by mouth Every Night., Disp: 30 tablet, Rfl: 2    Review of Systems   Constitutional: Positive for fatigue.   Musculoskeletal: Positive for arthralgias and gait problem.   Neurological: Positive for weakness and numbness.       Vitals:    10/18/21 0814   BP: 130/80   Pulse: 111   Resp: 16   SpO2: 100%   Weight: 59 kg (130 lb)   Height: 162.6 cm (64\")   PainSc:   6       Urine Drug Screen: 6/7/2021  Appropriate: Yes    Objective   Physical Exam  Vitals and nursing note reviewed.   Constitutional:       General: She is not in acute distress.     " Appearance: Normal appearance. She is normal weight.   Musculoskeletal:         General: Swelling and tenderness present.      Comments: Left lower extremity:  1.  Diffusely tender to even light palpation with signs of allodynia in the left leg and left foot  2.  Swelling present in the left foot compared to the right foot  3.  Slight decreased temperature in the left leg compared to the right leg   Skin:     General: Skin is warm and dry.   Neurological:      Mental Status: She is alert.           Assessment/Plan   Problems Addressed this Visit     None      Visit Diagnoses     Complex regional pain syndrome type 1 of left lower extremity        Relevant Medications    oxyCODONE-acetaminophen (PERCOCET)  MG per tablet    Other Relevant Orders    MRI Lumbar Spine Without Contrast      Diagnoses       Codes Comments    Complex regional pain syndrome type 1 of left lower extremity     ICD-10-CM: G90.522  ICD-9-CM: 337.22           Plan:  1. Refill oxycodone and Elavil  2. MRI for lumbar radiculopathy  3. She may require spinal cord stimulator in the future  --- Follow-up 1 month           INSPECT REPORT    As part of the patient's treatment plan, I may be prescribing controlled substances. The patient has been made aware of appropriate use of such medications, including potential risk of somnolence, limited ability to drive and/or work safely, and the potential for dependence or overdose. It has also bee made clear that these medications are for use by this patient only, without concomitant use of alcohol or other substances unless prescribed.     Patient has completed prescribing agreement detailing terms of continued prescribing of controlled substances, including monitoring SMITH reports, urine drug screening, and pill counts if necessary. The patient is aware that inappropriate use will results in cessation of prescribing such medications.    INSPECT report has been reviewed and scanned into the patient's  chart.    As the clinician, I personally reviewed the INSPECT from 10/14/2021.    History and physical exam exhibit continued safe and appropriate use of controlled substances.      EMR Dragon/Transcription disclaimer:   Much of this encounter note is an electronic transcription/translation of spoken language to printed text. The electronic translation of spoken language may permit erroneous, or at times, nonsensical words or phrases to be inadvertently transcribed; Although I have reviewed the note for such errors, some may still exist.

## 2021-10-22 NOTE — TELEPHONE ENCOUNTER
Called pt to let her know that [facility] insulin does come in Flexpens and that Dr. AMAYA sent a rx to [facility] for 70/30 insulin. Explained to pt that this will replace the Lantus and Novolog insulin.

## 2021-11-09 ENCOUNTER — OFFICE VISIT (OUTPATIENT)
Dept: ENDOCRINOLOGY | Facility: CLINIC | Age: 46
End: 2021-11-09

## 2021-11-09 DIAGNOSIS — E11.42 TYPE 2 DIABETES MELLITUS WITH DIABETIC POLYNEUROPATHY, WITH LONG-TERM CURRENT USE OF INSULIN (HCC): ICD-10-CM

## 2021-11-09 DIAGNOSIS — Z79.4 TYPE 2 DIABETES MELLITUS WITH DIABETIC POLYNEUROPATHY, WITH LONG-TERM CURRENT USE OF INSULIN (HCC): ICD-10-CM

## 2021-11-09 PROCEDURE — G0109 DIAB MANAGE TRN IND/GROUP: HCPCS | Performed by: DIETITIAN, REGISTERED

## 2021-11-15 ENCOUNTER — OFFICE VISIT (OUTPATIENT)
Dept: PAIN MEDICINE | Facility: CLINIC | Age: 46
End: 2021-11-15

## 2021-11-15 VITALS
HEART RATE: 104 BPM | DIASTOLIC BLOOD PRESSURE: 87 MMHG | RESPIRATION RATE: 16 BRPM | WEIGHT: 130 LBS | HEIGHT: 64 IN | BODY MASS INDEX: 22.2 KG/M2 | OXYGEN SATURATION: 98 % | SYSTOLIC BLOOD PRESSURE: 145 MMHG

## 2021-11-15 DIAGNOSIS — E09.43: Primary | ICD-10-CM

## 2021-11-15 DIAGNOSIS — G90.522 COMPLEX REGIONAL PAIN SYNDROME TYPE 1 OF LEFT LOWER EXTREMITY: ICD-10-CM

## 2021-11-15 PROCEDURE — 99214 OFFICE O/P EST MOD 30 MIN: CPT | Performed by: STUDENT IN AN ORGANIZED HEALTH CARE EDUCATION/TRAINING PROGRAM

## 2021-11-15 RX ORDER — PREGABALIN 50 MG/1
50 CAPSULE ORAL 2 TIMES DAILY
Qty: 60 CAPSULE | Refills: 0 | Status: SHIPPED | OUTPATIENT
Start: 2021-11-15 | End: 2022-01-12

## 2021-11-15 RX ORDER — OXYCODONE AND ACETAMINOPHEN 10; 325 MG/1; MG/1
1 TABLET ORAL 2 TIMES DAILY PRN
Qty: 60 TABLET | Refills: 0 | Status: SHIPPED | OUTPATIENT
Start: 2021-11-15 | End: 2022-02-07 | Stop reason: SDUPTHER

## 2021-11-15 NOTE — PROGRESS NOTES
CHIEF COMPLAINT  Chief Complaint   Patient presents with   • Leg Pain     left  greater than rt-( previous injections made pain worse , pt stated)- no cbd use-- oxycodone ld 11/14 at 8pm-- pt reviewed meds       Primary Care  Provider, No Known    Subjective   Hope NICHOLAS Mc is a 46 y.o. female  who presents for cancer-related pain and left lower extremity allodynia.  She states that she is having progressively worsening pain for many years following the diagnosis of CML.  She has been previously treated with over-the-counter medications which are no longer effective.  She states in the past, she had taken some hydrocodone which was also not effective.  She is unable to tolerate gabapentin due to side effects.  In addition to describing generalized leg and body aches from her cancer, she also describes allodynia-like symptoms in the left leg.  She states is difficult for her to wear shoes due to significant pain in the left foot.  She also describes pain with light touch of the left lower extremity.  She endorses swelling in the left lower extremity as well as changing of temperature left lower extremity.  She also has significant trouble walking and some weakness    Extremity Pain   Associated symptoms include numbness.   Leg Pain   Associated symptoms include numbness.        Location: Whole body, left lower extremity  Onset: Years ago  Duration: Progressively worsening  Timing: Constant throughout the day  Quality: Aching sharp pains in the left lower extremity  Severity: Today: 6       Last Week: 6       Worst: 10  Modifying Factors: The pain is worse with any type of movement and physical activity.  Pain is also exacerbated by light touch of the left lower extremity    Physical Therapy: no    Interval Update 11/15/2021: She is having an MRI done tomorrow.  Otherwise, no new issues.  It appears that she has done very well following the lumbar sympathetic blocks.  She continues to complain of some coolness in the  legs however they are both warm to touch.  She still has issues with hyperglycemia and it appears that she has a significant component of diabetic polyneuropathy and possible diabetic autonomic dysfunction    The following portions of the patient's history were reviewed and updated as appropriate: allergies, current medications, past family history, past medical history, past social history, past surgical history and problem list.      Current Outpatient Medications:   •  Accu-Chek Guide test strip, 1 each by Other route 3 (Three) Times a Day Before Meals. Dx: E11.65. Use as instructed, Disp: 100 each, Rfl: 2  •  Accu-Chek Softclix Lancets lancets, 1 each by Other route 3 (Three) Times a Day Before Meals. Dx: E11.65. Use as instructed, Disp: 100 each, Rfl: 2  •  amitriptyline (ELAVIL) 50 MG tablet, Take 1 tablet by mouth Every Night., Disp: 30 tablet, Rfl: 2  •  Insulin NPH Isophane & Regular (NovoLIN 70/30 FlexPen Relion) (70-30) 100 UNIT/ML suspension pen-injector, Inject 20 units ac breakfast, 10 units ac supper., Disp: 15 mL, Rfl: 5  •  Insulin Pen Needle (Pen Needles) 32G X 4 MM misc, 1 each Daily. For use with insulin pens,, Disp: 400 each, Rfl: 3  •  nilotinib (Tasigna) 150 MG capsule capsule, Take 2 capsules by mouth Take As Directed. Take 2 capsules (300mg) by mouth every 12 hours., Disp: 120 capsule, Rfl: 6  •  ondansetron (ZOFRAN) 4 MG tablet, Take 1 tablet by mouth Every 6 (Six) Hours As Needed for Nausea or Vomiting., Disp: 39 tablet, Rfl: 0  •  oxyCODONE-acetaminophen (PERCOCET)  MG per tablet, Take 1 tablet by mouth 2 (Two) Times a Day As Needed for Moderate Pain ., Disp: 60 tablet, Rfl: 0  •  pregabalin (LYRICA) 50 MG capsule, Take 1 capsule by mouth 2 (Two) Times a Day., Disp: 60 capsule, Rfl: 0    Review of Systems   Constitutional: Positive for fatigue.   Musculoskeletal: Positive for arthralgias and gait problem.   Neurological: Positive for weakness and numbness.       Vitals:    11/15/21  "1010   BP: 145/87   Pulse: 104   Resp: 16   SpO2: 98%   Weight: 59 kg (130 lb)   Height: 162.6 cm (64\")   PainSc:   8       Urine Drug Screen: 6/7/2021  Appropriate: Yes    Objective   Physical Exam  Vitals and nursing note reviewed.   Constitutional:       General: She is not in acute distress.     Appearance: Normal appearance. She is normal weight.   Musculoskeletal:         General: Swelling and tenderness present.      Comments: Left lower extremity:  1.  Diffusely tender to even light palpation with signs of allodynia in the left leg and left foot  2.  Swelling present in the left foot compared to the right foot  3.  Slight decreased temperature in the left leg compared to the right leg   Skin:     General: Skin is warm and dry.   Neurological:      Mental Status: She is alert.           Assessment/Plan   Problems Addressed this Visit     None      Visit Diagnoses     Diabetic autonomic neuropathy associated with drug or chemical induced diabetes mellitus (HCC)    -  Primary    Complex regional pain syndrome type 1 of left lower extremity        Relevant Medications    pregabalin (LYRICA) 50 MG capsule    oxyCODONE-acetaminophen (PERCOCET)  MG per tablet      Diagnoses       Codes Comments    Diabetic autonomic neuropathy associated with drug or chemical induced diabetes mellitus (HCC)    -  Primary ICD-10-CM: E09.43  ICD-9-CM: 249.60, 337.1     Complex regional pain syndrome type 1 of left lower extremity     ICD-10-CM: G90.522  ICD-9-CM: 337.22           Plan:  1. Refill oxycodone and Elavil  2. Pending MRI tomorrow  3. As she continues to complain of some abnormal cold sensations now in the legs bilaterally with a significant improvement in her allodynia symptoms, I feel that she likely has a component of diabetic polyneuropathy and possible diabetic autonomic dysfunction due to chemotherapy  4. Pending the MRI, will likely be candidate for spinal cord stimulator for painful diabetic peripheral " neuropathy  --- Follow-up 1 month           INSPECT REPORT    As part of the patient's treatment plan, I may be prescribing controlled substances. The patient has been made aware of appropriate use of such medications, including potential risk of somnolence, limited ability to drive and/or work safely, and the potential for dependence or overdose. It has also bee made clear that these medications are for use by this patient only, without concomitant use of alcohol or other substances unless prescribed.     Patient has completed prescribing agreement detailing terms of continued prescribing of controlled substances, including monitoring SMITH reports, urine drug screening, and pill counts if necessary. The patient is aware that inappropriate use will results in cessation of prescribing such medications.    INSPECT report has been reviewed and scanned into the patient's chart.    As the clinician, I personally reviewed the INSPECT from 11/12/2021.    History and physical exam exhibit continued safe and appropriate use of controlled substances.      EMR Dragon/Transcription disclaimer:   Much of this encounter note is an electronic transcription/translation of spoken language to printed text. The electronic translation of spoken language may permit erroneous, or at times, nonsensical words or phrases to be inadvertently transcribed; Although I have reviewed the note for such errors, some may still exist.

## 2021-11-16 ENCOUNTER — HOSPITAL ENCOUNTER (OUTPATIENT)
Dept: MRI IMAGING | Facility: HOSPITAL | Age: 46
Discharge: HOME | End: 2021-11-16
Admitting: STUDENT IN AN ORGANIZED HEALTH CARE EDUCATION/TRAINING PROGRAM

## 2021-11-16 DIAGNOSIS — G90.522 COMPLEX REGIONAL PAIN SYNDROME TYPE 1 OF LEFT LOWER EXTREMITY: ICD-10-CM

## 2021-11-16 PROCEDURE — 72148 MRI LUMBAR SPINE W/O DYE: CPT

## 2021-12-14 DIAGNOSIS — E11.42 TYPE 2 DIABETES MELLITUS WITH DIABETIC POLYNEUROPATHY, WITH LONG-TERM CURRENT USE OF INSULIN (HCC): ICD-10-CM

## 2021-12-14 DIAGNOSIS — Z79.4 TYPE 2 DIABETES MELLITUS WITH DIABETIC POLYNEUROPATHY, WITH LONG-TERM CURRENT USE OF INSULIN (HCC): ICD-10-CM

## 2021-12-14 RX ORDER — HUMAN INSULIN 100 [IU]/ML
INJECTION, SUSPENSION SUBCUTANEOUS
Qty: 15 ML | Refills: 5 | Status: SHIPPED | OUTPATIENT
Start: 2021-12-14 | End: 2022-01-12

## 2022-01-12 ENCOUNTER — APPOINTMENT (OUTPATIENT)
Dept: CT IMAGING | Facility: HOSPITAL | Age: 47
End: 2022-01-12

## 2022-01-12 ENCOUNTER — HOSPITAL ENCOUNTER (INPATIENT)
Facility: HOSPITAL | Age: 47
LOS: 5 days | Discharge: HOME | End: 2022-01-17
Attending: INTERNAL MEDICINE | Admitting: INTERNAL MEDICINE

## 2022-01-12 DIAGNOSIS — R73.9 HYPERGLYCEMIA: ICD-10-CM

## 2022-01-12 DIAGNOSIS — D64.9 ANEMIA, UNSPECIFIED TYPE: ICD-10-CM

## 2022-01-12 DIAGNOSIS — J90 BILATERAL PLEURAL EFFUSION: ICD-10-CM

## 2022-01-12 DIAGNOSIS — R06.00 DYSPNEA, UNSPECIFIED TYPE: Primary | ICD-10-CM

## 2022-01-12 PROBLEM — U07.1 COVID-19 VIRUS INFECTION: Status: ACTIVE | Noted: 2022-01-12

## 2022-01-12 PROBLEM — R06.02 SHORTNESS OF BREATH: Status: ACTIVE | Noted: 2022-01-12

## 2022-01-12 LAB
ABO GROUP BLD: NORMAL
ACETONE BLD QL: NEGATIVE
ALBUMIN SERPL-MCNC: 2.7 G/DL (ref 3.5–5.2)
ALBUMIN/GLOB SERPL: 0.5 G/DL
ALP SERPL-CCNC: 723 U/L (ref 39–117)
ALT SERPL W P-5'-P-CCNC: 6 U/L (ref 1–33)
ANION GAP SERPL CALCULATED.3IONS-SCNC: 12 MMOL/L (ref 5–15)
ANISOCYTOSIS BLD QL: ABNORMAL
ANTI-E: NORMAL
APTT PPP: 28.4 SECONDS (ref 24–31)
AST SERPL-CCNC: 12 U/L (ref 1–32)
ATMOSPHERIC PRESS: ABNORMAL MM[HG]
B PARAPERT DNA SPEC QL NAA+PROBE: NOT DETECTED
B PERT DNA SPEC QL NAA+PROBE: NOT DETECTED
BASE EXCESS BLDV CALC-SCNC: -0.4 MMOL/L (ref -2–2)
BASOPHILS # BLD MANUAL: 10.15 10*3/MM3 (ref 0–0.2)
BASOPHILS NFR BLD MANUAL: 7 % (ref 0–1.5)
BDY SITE: ABNORMAL
BILIRUB SERPL-MCNC: 1.4 MG/DL (ref 0–1.2)
BLASTS NFR BLD MANUAL: 12 % (ref 0–0)
BLD GP AB SCN SERPL QL: POSITIVE
BUN SERPL-MCNC: 7 MG/DL (ref 6–20)
BUN/CREAT SERPL: 13.5 (ref 7–25)
C PNEUM DNA NPH QL NAA+NON-PROBE: NOT DETECTED
CALCIUM SPEC-SCNC: 8.1 MG/DL (ref 8.6–10.5)
CHLORIDE SERPL-SCNC: 91 MMOL/L (ref 98–107)
CO2 BLDA-SCNC: 24.3 MMOL/L (ref 22–29)
CO2 SERPL-SCNC: 24 MMOL/L (ref 22–29)
CREAT SERPL-MCNC: 0.52 MG/DL (ref 0.57–1)
DEPRECATED RDW RBC AUTO: 59.1 FL (ref 37–54)
DEPRECATED RDW RBC AUTO: 59.9 FL (ref 37–54)
E AG RBC QL: NEGATIVE
EOSINOPHIL # BLD MANUAL: 1.45 10*3/MM3 (ref 0–0.4)
EOSINOPHIL NFR BLD MANUAL: 1 % (ref 0.3–6.2)
ERYTHROCYTE [DISTWIDTH] IN BLOOD BY AUTOMATED COUNT: 23.3 % (ref 12.3–15.4)
ERYTHROCYTE [DISTWIDTH] IN BLOOD BY AUTOMATED COUNT: 23.3 % (ref 12.3–15.4)
FLUAV SUBTYP SPEC NAA+PROBE: NOT DETECTED
FLUBV RNA ISLT QL NAA+PROBE: NOT DETECTED
GFR SERPL CREATININE-BSD FRML MDRD: 127 ML/MIN/1.73
GLOBULIN UR ELPH-MCNC: 5 GM/DL
GLUCOSE BLDC GLUCOMTR-MCNC: 503 MG/DL (ref 70–105)
GLUCOSE BLDC GLUCOMTR-MCNC: >600 MG/DL (ref 70–105)
GLUCOSE SERPL-MCNC: 606 MG/DL (ref 65–99)
HADV DNA SPEC NAA+PROBE: NOT DETECTED
HCO3 BLDV-SCNC: 23.3 MMOL/L (ref 22–26)
HCOV 229E RNA SPEC QL NAA+PROBE: NOT DETECTED
HCOV HKU1 RNA SPEC QL NAA+PROBE: NOT DETECTED
HCOV NL63 RNA SPEC QL NAA+PROBE: NOT DETECTED
HCOV OC43 RNA SPEC QL NAA+PROBE: NOT DETECTED
HCT VFR BLD AUTO: 21.4 % (ref 34–46.6)
HCT VFR BLD AUTO: 21.6 % (ref 34–46.6)
HGB BLD-MCNC: 6.1 G/DL (ref 12–15.9)
HGB BLD-MCNC: 6.7 G/DL (ref 12–15.9)
HMPV RNA NPH QL NAA+NON-PROBE: NOT DETECTED
HPIV1 RNA ISLT QL NAA+PROBE: NOT DETECTED
HPIV2 RNA SPEC QL NAA+PROBE: NOT DETECTED
HPIV3 RNA NPH QL NAA+PROBE: NOT DETECTED
HPIV4 P GENE NPH QL NAA+PROBE: NOT DETECTED
INHALED O2 CONCENTRATION: 21 %
INR PPP: 1.18 (ref 0.93–1.1)
IRON 24H UR-MRATE: 90 MCG/DL (ref 37–145)
IRON SATN MFR SERPL: 30 % (ref 20–50)
KELL: NEGATIVE
KIDD A ANTIGEN: NEGATIVE
L PNEUMO1 AG UR QL IA: NEGATIVE
LARGE PLATELETS: ABNORMAL
LYMPHOCYTES # BLD MANUAL: 8.7 10*3/MM3 (ref 0.7–3.1)
LYMPHOCYTES NFR BLD MANUAL: 7 % (ref 5–12)
M PNEUMO IGG SER IA-ACNC: NOT DETECTED
MAGNESIUM SERPL-MCNC: 1.8 MG/DL (ref 1.6–2.6)
MCH RBC QN AUTO: 21.2 PG (ref 26.6–33)
MCH RBC QN AUTO: 22.8 PG (ref 26.6–33)
MCHC RBC AUTO-ENTMCNC: 28.3 G/DL (ref 31.5–35.7)
MCHC RBC AUTO-ENTMCNC: 30.9 G/DL (ref 31.5–35.7)
MCV RBC AUTO: 73.6 FL (ref 79–97)
MCV RBC AUTO: 75 FL (ref 79–97)
METAMYELOCYTES NFR BLD MANUAL: 2 % (ref 0–0)
MODALITY: ABNORMAL
MONOCYTES # BLD: 10.15 10*3/MM3 (ref 0.1–0.9)
MYELOCYTES NFR BLD MANUAL: 14 % (ref 0–0)
NEUTROPHILS # BLD AUTO: 59.45 10*3/MM3 (ref 1.7–7)
NEUTROPHILS NFR BLD MANUAL: 38 % (ref 42.7–76)
NEUTS BAND NFR BLD MANUAL: 3 % (ref 0–5)
NONSPECIFIC GEL REACTION: NORMAL
NRBC SPEC MANUAL: 8 /100 WBC (ref 0–0.2)
NT-PROBNP SERPL-MCNC: 3427 PG/ML (ref 0–450)
PATHOLOGY REVIEW: YES
PCO2 BLDV: 32.8 MM HG (ref 42–51)
PH BLDV: 7.46 PH UNITS (ref 7.32–7.43)
PHOSPHATE SERPL-MCNC: 1.8 MG/DL (ref 2.5–4.5)
PLATELET # BLD AUTO: 481 10*3/MM3 (ref 140–450)
PLATELET # BLD AUTO: 512 10*3/MM3 (ref 140–450)
PMV BLD AUTO: 8 FL (ref 6–12)
PMV BLD AUTO: 8 FL (ref 6–12)
PO2 BLDV: 28.1 MM HG (ref 40–42)
POIKILOCYTOSIS BLD QL SMEAR: ABNORMAL
POLYCHROMASIA BLD QL SMEAR: ABNORMAL
POTASSIUM SERPL-SCNC: 3.9 MMOL/L (ref 3.5–5.2)
PROCALCITONIN SERPL-MCNC: 0.85 NG/ML (ref 0–0.25)
PROLYMPHOCYTES NFR BLD MANUAL: 10 % (ref 0–0)
PROT SERPL-MCNC: 7.7 G/DL (ref 6–8.5)
PROTHROMBIN TIME: 12.9 SECONDS (ref 9.6–11.7)
RBC # BLD AUTO: 2.86 10*6/MM3 (ref 3.77–5.28)
RBC # BLD AUTO: 2.93 10*6/MM3 (ref 3.77–5.28)
RH BLD: POSITIVE
RHINOVIRUS RNA SPEC NAA+PROBE: NOT DETECTED
RSV RNA NPH QL NAA+NON-PROBE: NOT DETECTED
S PNEUM AG SPEC QL LA: NEGATIVE
SAO2 % BLDCOV: 57.6 % (ref 95–99)
SARS-COV-2 RNA NPH QL NAA+NON-PROBE: DETECTED
SCAN SLIDE: NORMAL
SMUDGE CELLS BLD QL SMEAR: ABNORMAL
SODIUM SERPL-SCNC: 127 MMOL/L (ref 136–145)
STOMATOCYTES BLD QL SMEAR: ABNORMAL
T&S EXPIRATION DATE: NORMAL
TIBC SERPL-MCNC: 298 MCG/DL (ref 298–536)
TRANSFERRIN SERPL-MCNC: 200 MG/DL (ref 200–360)
TROPONIN T SERPL-MCNC: <0.01 NG/ML (ref 0–0.03)
VARIANT LYMPHS NFR BLD MANUAL: 6 % (ref 19.6–45.3)
WBC NRBC COR # BLD: 130.4 10*3/MM3 (ref 3.4–10.8)
WBC NRBC COR # BLD: 145 10*3/MM3 (ref 3.4–10.8)

## 2022-01-12 PROCEDURE — 86922 COMPATIBILITY TEST ANTIGLOB: CPT

## 2022-01-12 PROCEDURE — 86850 RBC ANTIBODY SCREEN: CPT | Performed by: PHYSICIAN ASSISTANT

## 2022-01-12 PROCEDURE — 85730 THROMBOPLASTIN TIME PARTIAL: CPT | Performed by: PHYSICIAN ASSISTANT

## 2022-01-12 PROCEDURE — 86905 BLOOD TYPING RBC ANTIGENS: CPT | Performed by: PHYSICIAN ASSISTANT

## 2022-01-12 PROCEDURE — 87899 AGENT NOS ASSAY W/OPTIC: CPT | Performed by: INTERNAL MEDICINE

## 2022-01-12 PROCEDURE — 82803 BLOOD GASES ANY COMBINATION: CPT

## 2022-01-12 PROCEDURE — 0 IOPAMIDOL PER 1 ML: Performed by: PHYSICIAN ASSISTANT

## 2022-01-12 PROCEDURE — 86901 BLOOD TYPING SEROLOGIC RH(D): CPT | Performed by: PHYSICIAN ASSISTANT

## 2022-01-12 PROCEDURE — 84145 PROCALCITONIN (PCT): CPT | Performed by: INTERNAL MEDICINE

## 2022-01-12 PROCEDURE — 94640 AIRWAY INHALATION TREATMENT: CPT

## 2022-01-12 PROCEDURE — 86870 RBC ANTIBODY IDENTIFICATION: CPT | Performed by: PHYSICIAN ASSISTANT

## 2022-01-12 PROCEDURE — 99223 1ST HOSP IP/OBS HIGH 75: CPT | Performed by: INTERNAL MEDICINE

## 2022-01-12 PROCEDURE — 63710000001 INSULIN LISPRO (HUMAN) PER 5 UNITS: Performed by: INTERNAL MEDICINE

## 2022-01-12 PROCEDURE — 82652 VIT D 1 25-DIHYDROXY: CPT | Performed by: INTERNAL MEDICINE

## 2022-01-12 PROCEDURE — 83880 ASSAY OF NATRIURETIC PEPTIDE: CPT | Performed by: PHYSICIAN ASSISTANT

## 2022-01-12 PROCEDURE — 80053 COMPREHEN METABOLIC PANEL: CPT | Performed by: PHYSICIAN ASSISTANT

## 2022-01-12 PROCEDURE — 82607 VITAMIN B-12: CPT | Performed by: INTERNAL MEDICINE

## 2022-01-12 PROCEDURE — 25010000002 FUROSEMIDE PER 20 MG: Performed by: INTERNAL MEDICINE

## 2022-01-12 PROCEDURE — 36415 COLL VENOUS BLD VENIPUNCTURE: CPT | Performed by: PHYSICIAN ASSISTANT

## 2022-01-12 PROCEDURE — 86900 BLOOD TYPING SEROLOGIC ABO: CPT

## 2022-01-12 PROCEDURE — 85610 PROTHROMBIN TIME: CPT | Performed by: PHYSICIAN ASSISTANT

## 2022-01-12 PROCEDURE — 86901 BLOOD TYPING SEROLOGIC RH(D): CPT

## 2022-01-12 PROCEDURE — 85025 COMPLETE CBC W/AUTO DIFF WBC: CPT | Performed by: PHYSICIAN ASSISTANT

## 2022-01-12 PROCEDURE — 83540 ASSAY OF IRON: CPT | Performed by: INTERNAL MEDICINE

## 2022-01-12 PROCEDURE — 85007 BL SMEAR W/DIFF WBC COUNT: CPT | Performed by: PHYSICIAN ASSISTANT

## 2022-01-12 PROCEDURE — 86900 BLOOD TYPING SEROLOGIC ABO: CPT | Performed by: PHYSICIAN ASSISTANT

## 2022-01-12 PROCEDURE — 25010000002 CEFTRIAXONE PER 250 MG: Performed by: PHYSICIAN ASSISTANT

## 2022-01-12 PROCEDURE — 36430 TRANSFUSION BLD/BLD COMPNT: CPT

## 2022-01-12 PROCEDURE — 83735 ASSAY OF MAGNESIUM: CPT | Performed by: PHYSICIAN ASSISTANT

## 2022-01-12 PROCEDURE — 84484 ASSAY OF TROPONIN QUANT: CPT | Performed by: PHYSICIAN ASSISTANT

## 2022-01-12 PROCEDURE — 93005 ELECTROCARDIOGRAM TRACING: CPT | Performed by: PHYSICIAN ASSISTANT

## 2022-01-12 PROCEDURE — 82746 ASSAY OF FOLIC ACID SERUM: CPT | Performed by: INTERNAL MEDICINE

## 2022-01-12 PROCEDURE — 94799 UNLISTED PULMONARY SVC/PX: CPT

## 2022-01-12 PROCEDURE — 82009 KETONE BODYS QUAL: CPT | Performed by: PHYSICIAN ASSISTANT

## 2022-01-12 PROCEDURE — 99285 EMERGENCY DEPT VISIT HI MDM: CPT

## 2022-01-12 PROCEDURE — 63710000001 INSULIN REGULAR HUMAN PER 5 UNITS: Performed by: PHYSICIAN ASSISTANT

## 2022-01-12 PROCEDURE — 0202U NFCT DS 22 TRGT SARS-COV-2: CPT | Performed by: PHYSICIAN ASSISTANT

## 2022-01-12 PROCEDURE — 87040 BLOOD CULTURE FOR BACTERIA: CPT | Performed by: PHYSICIAN ASSISTANT

## 2022-01-12 PROCEDURE — 71275 CT ANGIOGRAPHY CHEST: CPT

## 2022-01-12 PROCEDURE — P9016 RBC LEUKOCYTES REDUCED: HCPCS

## 2022-01-12 PROCEDURE — 82962 GLUCOSE BLOOD TEST: CPT

## 2022-01-12 PROCEDURE — 84100 ASSAY OF PHOSPHORUS: CPT | Performed by: PHYSICIAN ASSISTANT

## 2022-01-12 PROCEDURE — 86905 BLOOD TYPING RBC ANTIGENS: CPT

## 2022-01-12 PROCEDURE — 84466 ASSAY OF TRANSFERRIN: CPT | Performed by: INTERNAL MEDICINE

## 2022-01-12 RX ORDER — OLANZAPINE 10 MG/2ML
1 INJECTION, POWDER, LYOPHILIZED, FOR SOLUTION INTRAMUSCULAR
Status: DISCONTINUED | OUTPATIENT
Start: 2022-01-12 | End: 2022-01-17 | Stop reason: HOSPADM

## 2022-01-12 RX ORDER — MAGNESIUM SULFATE HEPTAHYDRATE 40 MG/ML
4 INJECTION, SOLUTION INTRAVENOUS AS NEEDED
Status: DISCONTINUED | OUTPATIENT
Start: 2022-01-12 | End: 2022-01-17 | Stop reason: HOSPADM

## 2022-01-12 RX ORDER — HUMAN INSULIN 100 [IU]/ML
20 INJECTION, SUSPENSION SUBCUTANEOUS
COMMUNITY
End: 2022-01-17 | Stop reason: HOSPADM

## 2022-01-12 RX ORDER — ASCORBIC ACID 500 MG
1000 TABLET ORAL DAILY
Status: DISCONTINUED | OUTPATIENT
Start: 2022-01-12 | End: 2022-01-17 | Stop reason: HOSPADM

## 2022-01-12 RX ORDER — POTASSIUM CHLORIDE 7.45 MG/ML
10 INJECTION INTRAVENOUS
Status: DISCONTINUED | OUTPATIENT
Start: 2022-01-12 | End: 2022-01-17 | Stop reason: HOSPADM

## 2022-01-12 RX ORDER — DEXTROSE MONOHYDRATE 25 G/50ML
25 INJECTION, SOLUTION INTRAVENOUS
Status: DISCONTINUED | OUTPATIENT
Start: 2022-01-12 | End: 2022-01-17 | Stop reason: HOSPADM

## 2022-01-12 RX ORDER — BUDESONIDE AND FORMOTEROL FUMARATE DIHYDRATE 160; 4.5 UG/1; UG/1
2 AEROSOL RESPIRATORY (INHALATION)
Status: DISCONTINUED | OUTPATIENT
Start: 2022-01-12 | End: 2022-01-17 | Stop reason: HOSPADM

## 2022-01-12 RX ORDER — INSULIN LISPRO 100 [IU]/ML
0-24 INJECTION, SOLUTION INTRAVENOUS; SUBCUTANEOUS
Status: DISCONTINUED | OUTPATIENT
Start: 2022-01-12 | End: 2022-01-13

## 2022-01-12 RX ORDER — POTASSIUM CHLORIDE 20 MEQ/1
40 TABLET, EXTENDED RELEASE ORAL AS NEEDED
Status: DISCONTINUED | OUTPATIENT
Start: 2022-01-12 | End: 2022-01-17 | Stop reason: HOSPADM

## 2022-01-12 RX ORDER — NICOTINE POLACRILEX 4 MG
15 LOZENGE BUCCAL
Status: DISCONTINUED | OUTPATIENT
Start: 2022-01-12 | End: 2022-01-17 | Stop reason: HOSPADM

## 2022-01-12 RX ORDER — ZINC SULFATE 50(220)MG
220 CAPSULE ORAL DAILY
Status: DISCONTINUED | OUTPATIENT
Start: 2022-01-12 | End: 2022-01-15

## 2022-01-12 RX ORDER — POTASSIUM CHLORIDE 1.5 G/1.77G
40 POWDER, FOR SOLUTION ORAL AS NEEDED
Status: DISCONTINUED | OUTPATIENT
Start: 2022-01-12 | End: 2022-01-17 | Stop reason: HOSPADM

## 2022-01-12 RX ORDER — FUROSEMIDE 10 MG/ML
20 INJECTION INTRAMUSCULAR; INTRAVENOUS EVERY 12 HOURS
Status: DISCONTINUED | OUTPATIENT
Start: 2022-01-12 | End: 2022-01-17 | Stop reason: HOSPADM

## 2022-01-12 RX ORDER — SODIUM CHLORIDE 0.9 % (FLUSH) 0.9 %
10 SYRINGE (ML) INJECTION AS NEEDED
Status: DISCONTINUED | OUTPATIENT
Start: 2022-01-12 | End: 2022-01-17 | Stop reason: HOSPADM

## 2022-01-12 RX ORDER — CALCIUM GLUCONATE 20 MG/ML
1 INJECTION, SOLUTION INTRAVENOUS AS NEEDED
Status: DISCONTINUED | OUTPATIENT
Start: 2022-01-12 | End: 2022-01-17 | Stop reason: HOSPADM

## 2022-01-12 RX ORDER — CALCIUM GLUCONATE 20 MG/ML
2 INJECTION, SOLUTION INTRAVENOUS AS NEEDED
Status: DISCONTINUED | OUTPATIENT
Start: 2022-01-12 | End: 2022-01-17 | Stop reason: HOSPADM

## 2022-01-12 RX ORDER — HUMAN INSULIN 100 [IU]/ML
10 INJECTION, SUSPENSION SUBCUTANEOUS
COMMUNITY
End: 2022-01-17 | Stop reason: HOSPADM

## 2022-01-12 RX ORDER — MAGNESIUM SULFATE HEPTAHYDRATE 40 MG/ML
2 INJECTION, SOLUTION INTRAVENOUS AS NEEDED
Status: DISCONTINUED | OUTPATIENT
Start: 2022-01-12 | End: 2022-01-17 | Stop reason: HOSPADM

## 2022-01-12 RX ORDER — INSULIN LISPRO 100 [IU]/ML
0-24 INJECTION, SOLUTION INTRAVENOUS; SUBCUTANEOUS AS NEEDED
Status: DISCONTINUED | OUTPATIENT
Start: 2022-01-12 | End: 2022-01-13

## 2022-01-12 RX ORDER — ALBUTEROL SULFATE 90 UG/1
2 AEROSOL, METERED RESPIRATORY (INHALATION)
Status: DISCONTINUED | OUTPATIENT
Start: 2022-01-12 | End: 2022-01-17 | Stop reason: HOSPADM

## 2022-01-12 RX ORDER — ASCORBIC ACID 500 MG
1000 TABLET ORAL DAILY
Status: DISCONTINUED | OUTPATIENT
Start: 2022-01-12 | End: 2022-01-12

## 2022-01-12 RX ADMIN — Medication 10 ML: at 14:12

## 2022-01-12 RX ADMIN — IOPAMIDOL 100 ML: 755 INJECTION, SOLUTION INTRAVENOUS at 15:13

## 2022-01-12 RX ADMIN — FUROSEMIDE 20 MG: 20 INJECTION, SOLUTION INTRAMUSCULAR; INTRAVENOUS at 21:51

## 2022-01-12 RX ADMIN — OXYCODONE HYDROCHLORIDE AND ACETAMINOPHEN 1000 MG: 500 TABLET ORAL at 23:21

## 2022-01-12 RX ADMIN — INSULIN HUMAN 6 UNITS: 100 INJECTION, SOLUTION PARENTERAL at 15:42

## 2022-01-12 RX ADMIN — Medication 220 MG: at 23:21

## 2022-01-12 RX ADMIN — Medication 5000 UNITS: at 23:21

## 2022-01-12 RX ADMIN — CEFTRIAXONE 1 G: 1 INJECTION, POWDER, FOR SOLUTION INTRAMUSCULAR; INTRAVENOUS at 16:56

## 2022-01-12 RX ADMIN — BUDESONIDE AND FORMOTEROL FUMARATE DIHYDRATE 2 PUFF: 160; 4.5 AEROSOL RESPIRATORY (INHALATION) at 20:20

## 2022-01-12 RX ADMIN — Medication 10 ML: at 15:46

## 2022-01-12 RX ADMIN — INSULIN LISPRO 24 UNITS: 100 INJECTION, SOLUTION INTRAVENOUS; SUBCUTANEOUS at 21:50

## 2022-01-12 RX ADMIN — ALBUTEROL SULFATE 2 PUFF: 108 INHALANT RESPIRATORY (INHALATION) at 20:20

## 2022-01-13 ENCOUNTER — APPOINTMENT (OUTPATIENT)
Dept: CARDIOLOGY | Facility: HOSPITAL | Age: 47
End: 2022-01-13

## 2022-01-13 ENCOUNTER — INPATIENT HOSPITAL (OUTPATIENT)
Dept: URBAN - METROPOLITAN AREA HOSPITAL 84 | Facility: HOSPITAL | Age: 47
End: 2022-01-13

## 2022-01-13 DIAGNOSIS — Z86.16 PERSONAL HISTORY OF COVID-19: ICD-10-CM

## 2022-01-13 DIAGNOSIS — R73.9 HYPERGLYCEMIA, UNSPECIFIED: ICD-10-CM

## 2022-01-13 DIAGNOSIS — D50.9 IRON DEFICIENCY ANEMIA, UNSPECIFIED: ICD-10-CM

## 2022-01-13 DIAGNOSIS — R63.4 ABNORMAL WEIGHT LOSS: ICD-10-CM

## 2022-01-13 LAB
AMYLASE SERPL-CCNC: 8 U/L (ref 28–100)
ANION GAP SERPL CALCULATED.3IONS-SCNC: 10 MMOL/L (ref 5–15)
ANISOCYTOSIS BLD QL: ABNORMAL
BASOPHILS # BLD MANUAL: 11.55 10*3/MM3 (ref 0–0.2)
BASOPHILS # BLD MANUAL: 4.08 10*3/MM3 (ref 0–0.2)
BASOPHILS # BLD MANUAL: 5.22 10*3/MM3 (ref 0–0.2)
BASOPHILS # BLD MANUAL: 7.65 10*3/MM3 (ref 0–0.2)
BASOPHILS NFR BLD MANUAL: 3 % (ref 0–1.5)
BASOPHILS NFR BLD MANUAL: 4 % (ref 0–1.5)
BASOPHILS NFR BLD MANUAL: 6 % (ref 0–1.5)
BASOPHILS NFR BLD MANUAL: 9 % (ref 0–1.5)
BLASTS NFR BLD MANUAL: 11 % (ref 0–0)
BLASTS NFR BLD MANUAL: 12 % (ref 0–0)
BLASTS NFR BLD MANUAL: 12 % (ref 0–0)
BLASTS NFR BLD MANUAL: 21 % (ref 0–0)
BUN SERPL-MCNC: 8 MG/DL (ref 6–20)
BUN/CREAT SERPL: 16.3 (ref 7–25)
CA-I SERPL ISE-MCNC: 1.06 MMOL/L (ref 1.2–1.3)
CALCIUM SPEC-SCNC: 8 MG/DL (ref 8.6–10.5)
CHLORIDE SERPL-SCNC: 97 MMOL/L (ref 98–107)
CHOLEST SERPL-MCNC: 87 MG/DL (ref 0–200)
CK SERPL-CCNC: 16 U/L (ref 20–180)
CO2 SERPL-SCNC: 24 MMOL/L (ref 22–29)
CREAT SERPL-MCNC: 0.49 MG/DL (ref 0.57–1)
D-LACTATE SERPL-SCNC: 1.9 MMOL/L (ref 0.5–2)
DEPRECATED RDW RBC AUTO: 63.4 FL (ref 37–54)
DEPRECATED RDW RBC AUTO: 63.9 FL (ref 37–54)
DEPRECATED RDW RBC AUTO: 66.1 FL (ref 37–54)
EOSINOPHIL # BLD MANUAL: 1.28 10*3/MM3 (ref 0–0.4)
EOSINOPHIL # BLD MANUAL: 1.36 10*3/MM3 (ref 0–0.4)
EOSINOPHIL # BLD MANUAL: 2.57 10*3/MM3 (ref 0–0.4)
EOSINOPHIL # BLD MANUAL: 6.52 10*3/MM3 (ref 0–0.4)
EOSINOPHIL NFR BLD MANUAL: 1 % (ref 0.3–6.2)
EOSINOPHIL NFR BLD MANUAL: 1 % (ref 0.3–6.2)
EOSINOPHIL NFR BLD MANUAL: 2 % (ref 0.3–6.2)
EOSINOPHIL NFR BLD MANUAL: 5 % (ref 0.3–6.2)
ERYTHROCYTE [DISTWIDTH] IN BLOOD BY AUTOMATED COUNT: 25 % (ref 12.3–15.4)
ERYTHROCYTE [DISTWIDTH] IN BLOOD BY AUTOMATED COUNT: 25.6 % (ref 12.3–15.4)
ERYTHROCYTE [DISTWIDTH] IN BLOOD BY AUTOMATED COUNT: 25.6 % (ref 12.3–15.4)
FERRITIN SERPL-MCNC: 1002 NG/ML (ref 13–150)
FOLATE SERPL-MCNC: 4.9 NG/ML (ref 4.78–24.2)
GFR SERPL CREATININE-BSD FRML MDRD: 136 ML/MIN/1.73
GLUCOSE BLDC GLUCOMTR-MCNC: 213 MG/DL (ref 70–105)
GLUCOSE BLDC GLUCOMTR-MCNC: 236 MG/DL (ref 70–105)
GLUCOSE BLDC GLUCOMTR-MCNC: 253 MG/DL (ref 70–105)
GLUCOSE BLDC GLUCOMTR-MCNC: 341 MG/DL (ref 70–105)
GLUCOSE BLDC GLUCOMTR-MCNC: 399 MG/DL (ref 70–105)
GLUCOSE BLDC GLUCOMTR-MCNC: 428 MG/DL (ref 70–105)
GLUCOSE BLDC GLUCOMTR-MCNC: 435 MG/DL (ref 70–105)
GLUCOSE SERPL-MCNC: 156 MG/DL (ref 65–99)
HBA1C MFR BLD: 10.8 % (ref 3.5–5.6)
HCT VFR BLD AUTO: 21.7 % (ref 34–46.6)
HCT VFR BLD AUTO: 25.2 % (ref 34–46.6)
HCT VFR BLD AUTO: 26.2 % (ref 34–46.6)
HCT VFR BLD AUTO: 26.8 % (ref 34–46.6)
HDLC SERPL-MCNC: 20 MG/DL (ref 40–60)
HGB BLD-MCNC: 6.8 G/DL (ref 12–15.9)
HGB BLD-MCNC: 7.9 G/DL (ref 12–15.9)
HGB BLD-MCNC: 8.3 G/DL (ref 12–15.9)
HGB BLD-MCNC: 8.5 G/DL (ref 12–15.9)
IRON 24H UR-MRATE: 104 MCG/DL (ref 37–145)
IRON SATN MFR SERPL: 35 % (ref 20–50)
LAB AP CASE REPORT: NORMAL
LARGE PLATELETS: ABNORMAL
LARGE PLATELETS: ABNORMAL
LDLC SERPL CALC-MCNC: 38 MG/DL (ref 0–100)
LDLC/HDLC SERPL: 1.62 {RATIO}
LYMPHOCYTES # BLD MANUAL: 11.55 10*3/MM3 (ref 0.7–3.1)
LYMPHOCYTES # BLD MANUAL: 20.4 10*3/MM3 (ref 0.7–3.1)
LYMPHOCYTES # BLD MANUAL: 20.86 10*3/MM3 (ref 0.7–3.1)
LYMPHOCYTES # BLD MANUAL: 28.56 10*3/MM3 (ref 0.7–3.1)
LYMPHOCYTES NFR BLD MANUAL: 11 % (ref 5–12)
LYMPHOCYTES NFR BLD MANUAL: 11 % (ref 5–12)
LYMPHOCYTES NFR BLD MANUAL: 5 % (ref 5–12)
LYMPHOCYTES NFR BLD MANUAL: 9 % (ref 5–12)
MCH RBC QN AUTO: 22.3 PG (ref 26.6–33)
MCH RBC QN AUTO: 23.8 PG (ref 26.6–33)
MCH RBC QN AUTO: 24.2 PG (ref 26.6–33)
MCHC RBC AUTO-ENTMCNC: 31.4 G/DL (ref 31.5–35.7)
MCHC RBC AUTO-ENTMCNC: 31.5 G/DL (ref 31.5–35.7)
MCHC RBC AUTO-ENTMCNC: 31.9 G/DL (ref 31.5–35.7)
MCV RBC AUTO: 70.9 FL (ref 79–97)
MCV RBC AUTO: 75.8 FL (ref 79–97)
MCV RBC AUTO: 75.9 FL (ref 79–97)
METAMYELOCYTES NFR BLD MANUAL: 2 % (ref 0–0)
METAMYELOCYTES NFR BLD MANUAL: 4 % (ref 0–0)
METAMYELOCYTES NFR BLD MANUAL: 6 % (ref 0–0)
MICROCYTES BLD QL: ABNORMAL
MICROCYTES BLD QL: ABNORMAL
MONOCYTES # BLD: 12.24 10*3/MM3 (ref 0.1–0.9)
MONOCYTES # BLD: 14.03 10*3/MM3 (ref 0.1–0.9)
MONOCYTES # BLD: 14.11 10*3/MM3 (ref 0.1–0.9)
MONOCYTES # BLD: 6.52 10*3/MM3 (ref 0.1–0.9)
MYELOCYTES NFR BLD MANUAL: 2 % (ref 0–0)
MYELOCYTES NFR BLD MANUAL: 3 % (ref 0–0)
MYELOCYTES NFR BLD MANUAL: 5 % (ref 0–0)
MYELOCYTES NFR BLD MANUAL: 6 % (ref 0–0)
NEUTROPHILS # BLD AUTO: 53.55 10*3/MM3 (ref 1.7–7)
NEUTROPHILS # BLD AUTO: 55.17 10*3/MM3 (ref 1.7–7)
NEUTROPHILS # BLD AUTO: 65.28 10*3/MM3 (ref 1.7–7)
NEUTROPHILS # BLD AUTO: 67.81 10*3/MM3 (ref 1.7–7)
NEUTROPHILS NFR BLD MANUAL: 34 % (ref 42.7–76)
NEUTROPHILS NFR BLD MANUAL: 37 % (ref 42.7–76)
NEUTROPHILS NFR BLD MANUAL: 42 % (ref 42.7–76)
NEUTROPHILS NFR BLD MANUAL: 45 % (ref 42.7–76)
NEUTS BAND NFR BLD MANUAL: 6 % (ref 0–5)
NEUTS BAND NFR BLD MANUAL: 6 % (ref 0–5)
NEUTS BAND NFR BLD MANUAL: 7 % (ref 0–5)
NEUTS BAND NFR BLD MANUAL: 8 % (ref 0–5)
NRBC SPEC MANUAL: 1 /100 WBC (ref 0–0.2)
NRBC SPEC MANUAL: 3 /100 WBC (ref 0–0.2)
NRBC SPEC MANUAL: 7 /100 WBC (ref 0–0.2)
PATH REPORT.FINAL DX SPEC: NORMAL
PLAT MORPH BLD: NORMAL
PLAT MORPH BLD: NORMAL
PLATELET # BLD AUTO: 417 10*3/MM3 (ref 140–450)
PLATELET # BLD AUTO: 516 10*3/MM3 (ref 140–450)
PLATELET # BLD AUTO: 520 10*3/MM3 (ref 140–450)
PMV BLD AUTO: 7 FL (ref 6–12)
PMV BLD AUTO: 7.7 FL (ref 6–12)
PMV BLD AUTO: 7.8 FL (ref 6–12)
POIKILOCYTOSIS BLD QL SMEAR: ABNORMAL
POLYCHROMASIA BLD QL SMEAR: ABNORMAL
POLYCHROMASIA BLD QL SMEAR: ABNORMAL
POTASSIUM SERPL-SCNC: 3.9 MMOL/L (ref 3.5–5.2)
PROCALCITONIN SERPL-MCNC: 0.71 NG/ML (ref 0–0.25)
PROMYELOCYTES NFR BLD MANUAL: 2 % (ref 0–0)
RBC # BLD AUTO: 3.06 10*6/MM3 (ref 3.77–5.28)
RBC # BLD AUTO: 3.32 10*6/MM3 (ref 3.77–5.28)
RBC # BLD AUTO: 3.53 10*6/MM3 (ref 3.77–5.28)
RBC MORPH BLD: NORMAL
SCAN SLIDE: NORMAL
SCAN SLIDE: NORMAL
SMALL PLATELETS BLD QL SMEAR: ABNORMAL
SODIUM SERPL-SCNC: 131 MMOL/L (ref 136–145)
TIBC SERPL-MCNC: 294 MCG/DL (ref 298–536)
TRANSFERRIN SERPL-MCNC: 197 MG/DL (ref 200–360)
TRIGL SERPL-MCNC: 173 MG/DL (ref 0–150)
TSH SERPL DL<=0.05 MIU/L-ACNC: 1.07 UIU/ML (ref 0.27–4.2)
VARIANT LYMPHS NFR BLD MANUAL: 14 % (ref 19.6–45.3)
VARIANT LYMPHS NFR BLD MANUAL: 16 % (ref 19.6–45.3)
VARIANT LYMPHS NFR BLD MANUAL: 2 % (ref 0–5)
VARIANT LYMPHS NFR BLD MANUAL: 21 % (ref 19.6–45.3)
VARIANT LYMPHS NFR BLD MANUAL: 9 % (ref 19.6–45.3)
VIT B12 BLD-MCNC: >2000 PG/ML (ref 211–946)
VLDLC SERPL-MCNC: 29 MG/DL (ref 5–40)
WBC MORPH BLD: NORMAL
WBC NRBC COR # BLD: 127.5 10*3/MM3 (ref 3.4–10.8)
WBC NRBC COR # BLD: 128.3 10*3/MM3 (ref 3.4–10.8)
WBC NRBC COR # BLD: 136 10*3/MM3 (ref 3.4–10.8)

## 2022-01-13 PROCEDURE — 63710000001 INSULIN LISPRO (HUMAN) PER 5 UNITS: Performed by: INTERNAL MEDICINE

## 2022-01-13 PROCEDURE — 82330 ASSAY OF CALCIUM: CPT | Performed by: INTERNAL MEDICINE

## 2022-01-13 PROCEDURE — 85007 BL SMEAR W/DIFF WBC COUNT: CPT | Performed by: INTERNAL MEDICINE

## 2022-01-13 PROCEDURE — 83036 HEMOGLOBIN GLYCOSYLATED A1C: CPT | Performed by: INTERNAL MEDICINE

## 2022-01-13 PROCEDURE — P9016 RBC LEUKOCYTES REDUCED: HCPCS

## 2022-01-13 PROCEDURE — 93308 TTE F-UP OR LMTD: CPT | Performed by: INTERNAL MEDICINE

## 2022-01-13 PROCEDURE — 99222 1ST HOSP IP/OBS MODERATE 55: CPT | Performed by: INTERNAL MEDICINE

## 2022-01-13 PROCEDURE — 93308 TTE F-UP OR LMTD: CPT

## 2022-01-13 PROCEDURE — 80061 LIPID PANEL: CPT | Performed by: INTERNAL MEDICINE

## 2022-01-13 PROCEDURE — 99222 1ST HOSP IP/OBS MODERATE 55: CPT | Performed by: NURSE PRACTITIONER

## 2022-01-13 PROCEDURE — 84443 ASSAY THYROID STIM HORMONE: CPT | Performed by: INTERNAL MEDICINE

## 2022-01-13 PROCEDURE — 82962 GLUCOSE BLOOD TEST: CPT

## 2022-01-13 PROCEDURE — 63710000001 INSULIN ISOPHANE & REGULAR PER 5 UNITS: Performed by: INTERNAL MEDICINE

## 2022-01-13 PROCEDURE — 86900 BLOOD TYPING SEROLOGIC ABO: CPT

## 2022-01-13 PROCEDURE — 25010000002 CALCIUM GLUCONATE-NACL 1-0.675 GM/50ML-% SOLUTION: Performed by: INTERNAL MEDICINE

## 2022-01-13 PROCEDURE — 25010000002 ENOXAPARIN PER 10 MG: Performed by: INTERNAL MEDICINE

## 2022-01-13 PROCEDURE — 94799 UNLISTED PULMONARY SVC/PX: CPT

## 2022-01-13 PROCEDURE — 85014 HEMATOCRIT: CPT | Performed by: INTERNAL MEDICINE

## 2022-01-13 PROCEDURE — 25010000002 CEFTRIAXONE PER 250 MG: Performed by: INTERNAL MEDICINE

## 2022-01-13 PROCEDURE — 85025 COMPLETE CBC W/AUTO DIFF WBC: CPT | Performed by: INTERNAL MEDICINE

## 2022-01-13 PROCEDURE — 82550 ASSAY OF CK (CPK): CPT | Performed by: INTERNAL MEDICINE

## 2022-01-13 PROCEDURE — 82728 ASSAY OF FERRITIN: CPT | Performed by: INTERNAL MEDICINE

## 2022-01-13 PROCEDURE — 93325 DOPPLER ECHO COLOR FLOW MAPG: CPT

## 2022-01-13 PROCEDURE — 80048 BASIC METABOLIC PNL TOTAL CA: CPT | Performed by: INTERNAL MEDICINE

## 2022-01-13 PROCEDURE — 84466 ASSAY OF TRANSFERRIN: CPT | Performed by: INTERNAL MEDICINE

## 2022-01-13 PROCEDURE — 83605 ASSAY OF LACTIC ACID: CPT | Performed by: INTERNAL MEDICINE

## 2022-01-13 PROCEDURE — 93325 DOPPLER ECHO COLOR FLOW MAPG: CPT | Performed by: INTERNAL MEDICINE

## 2022-01-13 PROCEDURE — 99232 SBSQ HOSP IP/OBS MODERATE 35: CPT | Performed by: INTERNAL MEDICINE

## 2022-01-13 PROCEDURE — 36430 TRANSFUSION BLD/BLD COMPNT: CPT

## 2022-01-13 PROCEDURE — 25010000002 FUROSEMIDE PER 20 MG: Performed by: INTERNAL MEDICINE

## 2022-01-13 PROCEDURE — 83540 ASSAY OF IRON: CPT | Performed by: INTERNAL MEDICINE

## 2022-01-13 PROCEDURE — 85018 HEMOGLOBIN: CPT | Performed by: INTERNAL MEDICINE

## 2022-01-13 PROCEDURE — 82150 ASSAY OF AMYLASE: CPT | Performed by: INTERNAL MEDICINE

## 2022-01-13 PROCEDURE — 84145 PROCALCITONIN (PCT): CPT | Performed by: INTERNAL MEDICINE

## 2022-01-13 RX ORDER — SODIUM CHLORIDE 0.9 % (FLUSH) 0.9 %
10 SYRINGE (ML) INJECTION EVERY 12 HOURS SCHEDULED
Status: DISCONTINUED | OUTPATIENT
Start: 2022-01-13 | End: 2022-01-17 | Stop reason: HOSPADM

## 2022-01-13 RX ORDER — FAMOTIDINE 20 MG/1
40 TABLET, FILM COATED ORAL DAILY
Status: DISCONTINUED | OUTPATIENT
Start: 2022-01-13 | End: 2022-01-15

## 2022-01-13 RX ORDER — PREGABALIN 25 MG/1
50 CAPSULE ORAL 2 TIMES DAILY
Status: DISCONTINUED | OUTPATIENT
Start: 2022-01-13 | End: 2022-01-13

## 2022-01-13 RX ORDER — INSULIN LISPRO 100 [IU]/ML
0-24 INJECTION, SOLUTION INTRAVENOUS; SUBCUTANEOUS AS NEEDED
Status: DISCONTINUED | OUTPATIENT
Start: 2022-01-13 | End: 2022-01-17

## 2022-01-13 RX ORDER — BISACODYL 10 MG
10 SUPPOSITORY, RECTAL RECTAL DAILY PRN
Status: DISCONTINUED | OUTPATIENT
Start: 2022-01-13 | End: 2022-01-17 | Stop reason: HOSPADM

## 2022-01-13 RX ORDER — ONDANSETRON 4 MG/1
4 TABLET, FILM COATED ORAL EVERY 6 HOURS PRN
Status: DISCONTINUED | OUTPATIENT
Start: 2022-01-13 | End: 2022-01-17 | Stop reason: HOSPADM

## 2022-01-13 RX ORDER — BISACODYL 5 MG/1
5 TABLET, DELAYED RELEASE ORAL DAILY PRN
Status: DISCONTINUED | OUTPATIENT
Start: 2022-01-13 | End: 2022-01-17 | Stop reason: HOSPADM

## 2022-01-13 RX ORDER — CALCIUM GLUCONATE 20 MG/ML
1 INJECTION, SOLUTION INTRAVENOUS ONCE
Status: COMPLETED | OUTPATIENT
Start: 2022-01-13 | End: 2022-01-13

## 2022-01-13 RX ORDER — INSULIN LISPRO 100 [IU]/ML
0-24 INJECTION, SOLUTION INTRAVENOUS; SUBCUTANEOUS
Status: DISCONTINUED | OUTPATIENT
Start: 2022-01-13 | End: 2022-01-17

## 2022-01-13 RX ORDER — ONDANSETRON 4 MG/1
4 TABLET, FILM COATED ORAL EVERY 6 HOURS PRN
Status: DISCONTINUED | OUTPATIENT
Start: 2022-01-13 | End: 2022-01-13

## 2022-01-13 RX ORDER — OXYCODONE HYDROCHLORIDE 5 MG/1
5 TABLET ORAL EVERY 4 HOURS PRN
Status: DISCONTINUED | OUTPATIENT
Start: 2022-01-13 | End: 2022-01-17 | Stop reason: HOSPADM

## 2022-01-13 RX ORDER — HYDROXYUREA 500 MG/1
500 CAPSULE ORAL 2 TIMES DAILY
Status: DISCONTINUED | OUTPATIENT
Start: 2022-01-13 | End: 2022-01-17 | Stop reason: HOSPADM

## 2022-01-13 RX ORDER — AMITRIPTYLINE HYDROCHLORIDE 50 MG/1
50 TABLET, FILM COATED ORAL NIGHTLY
Status: DISCONTINUED | OUTPATIENT
Start: 2022-01-13 | End: 2022-01-17 | Stop reason: HOSPADM

## 2022-01-13 RX ORDER — ONDANSETRON 2 MG/ML
4 INJECTION INTRAMUSCULAR; INTRAVENOUS EVERY 6 HOURS PRN
Status: DISCONTINUED | OUTPATIENT
Start: 2022-01-13 | End: 2022-01-17 | Stop reason: HOSPADM

## 2022-01-13 RX ORDER — POLYETHYLENE GLYCOL 3350 17 G/17G
17 POWDER, FOR SOLUTION ORAL DAILY PRN
Status: DISCONTINUED | OUTPATIENT
Start: 2022-01-13 | End: 2022-01-17 | Stop reason: HOSPADM

## 2022-01-13 RX ORDER — SODIUM CHLORIDE 0.9 % (FLUSH) 0.9 %
10 SYRINGE (ML) INJECTION AS NEEDED
Status: DISCONTINUED | OUTPATIENT
Start: 2022-01-13 | End: 2022-01-17 | Stop reason: HOSPADM

## 2022-01-13 RX ORDER — NITROGLYCERIN 0.4 MG/1
0.4 TABLET SUBLINGUAL
Status: DISCONTINUED | OUTPATIENT
Start: 2022-01-13 | End: 2022-01-17 | Stop reason: HOSPADM

## 2022-01-13 RX ORDER — CHOLECALCIFEROL (VITAMIN D3) 125 MCG
5 CAPSULE ORAL NIGHTLY PRN
Status: DISCONTINUED | OUTPATIENT
Start: 2022-01-13 | End: 2022-01-17 | Stop reason: HOSPADM

## 2022-01-13 RX ORDER — AMOXICILLIN 250 MG
2 CAPSULE ORAL 2 TIMES DAILY
Status: DISCONTINUED | OUTPATIENT
Start: 2022-01-13 | End: 2022-01-17 | Stop reason: HOSPADM

## 2022-01-13 RX ADMIN — ALBUTEROL SULFATE 2 PUFF: 108 INHALANT RESPIRATORY (INHALATION) at 20:38

## 2022-01-13 RX ADMIN — HYDROXYUREA 500 MG: 500 CAPSULE ORAL at 21:01

## 2022-01-13 RX ADMIN — SODIUM CHLORIDE, PRESERVATIVE FREE 10 ML: 5 INJECTION INTRAVENOUS at 08:49

## 2022-01-13 RX ADMIN — INSULIN LISPRO 12 UNITS: 100 INJECTION, SOLUTION INTRAVENOUS; SUBCUTANEOUS at 11:55

## 2022-01-13 RX ADMIN — FUROSEMIDE 20 MG: 20 INJECTION, SOLUTION INTRAMUSCULAR; INTRAVENOUS at 17:38

## 2022-01-13 RX ADMIN — DOCUSATE SODIUM AND SENNOSIDES 2 TABLET: 8.6; 5 TABLET, FILM COATED ORAL at 08:49

## 2022-01-13 RX ADMIN — INSULIN LISPRO 8 UNITS: 100 INJECTION, SOLUTION INTRAVENOUS; SUBCUTANEOUS at 08:49

## 2022-01-13 RX ADMIN — INSULIN HUMAN 20 UNITS: 100 INJECTION, SUSPENSION SUBCUTANEOUS at 08:49

## 2022-01-13 RX ADMIN — OXYCODONE HYDROCHLORIDE AND ACETAMINOPHEN 1000 MG: 500 TABLET ORAL at 21:01

## 2022-01-13 RX ADMIN — INSULIN HUMAN 20 UNITS: 100 INJECTION, SUSPENSION SUBCUTANEOUS at 17:41

## 2022-01-13 RX ADMIN — INSULIN LISPRO 24 UNITS: 100 INJECTION, SOLUTION INTRAVENOUS; SUBCUTANEOUS at 21:03

## 2022-01-13 RX ADMIN — ENOXAPARIN SODIUM 40 MG: 40 INJECTION SUBCUTANEOUS at 16:09

## 2022-01-13 RX ADMIN — FUROSEMIDE 20 MG: 20 INJECTION, SOLUTION INTRAMUSCULAR; INTRAVENOUS at 08:49

## 2022-01-13 RX ADMIN — BUDESONIDE AND FORMOTEROL FUMARATE DIHYDRATE 2 PUFF: 160; 4.5 AEROSOL RESPIRATORY (INHALATION) at 08:04

## 2022-01-13 RX ADMIN — FAMOTIDINE 40 MG: 20 TABLET ORAL at 08:49

## 2022-01-13 RX ADMIN — Medication 5000 UNITS: at 21:01

## 2022-01-13 RX ADMIN — INSULIN LISPRO 20 UNITS: 100 INJECTION, SOLUTION INTRAVENOUS; SUBCUTANEOUS at 23:45

## 2022-01-13 RX ADMIN — BUDESONIDE AND FORMOTEROL FUMARATE DIHYDRATE 2 PUFF: 160; 4.5 AEROSOL RESPIRATORY (INHALATION) at 20:31

## 2022-01-13 RX ADMIN — ALBUTEROL SULFATE 2 PUFF: 108 INHALANT RESPIRATORY (INHALATION) at 08:04

## 2022-01-13 RX ADMIN — Medication 220 MG: at 21:01

## 2022-01-13 RX ADMIN — PREGABALIN 50 MG: 25 CAPSULE ORAL at 08:49

## 2022-01-13 RX ADMIN — SODIUM CHLORIDE, PRESERVATIVE FREE 10 ML: 5 INJECTION INTRAVENOUS at 21:06

## 2022-01-13 RX ADMIN — CEFTRIAXONE 1 G: 1 INJECTION, POWDER, FOR SOLUTION INTRAMUSCULAR; INTRAVENOUS at 16:09

## 2022-01-13 RX ADMIN — INSULIN LISPRO 16 UNITS: 100 INJECTION, SOLUTION INTRAVENOUS; SUBCUTANEOUS at 17:38

## 2022-01-13 RX ADMIN — DOCUSATE SODIUM AND SENNOSIDES 2 TABLET: 8.6; 5 TABLET, FILM COATED ORAL at 21:01

## 2022-01-13 RX ADMIN — AMITRIPTYLINE HYDROCHLORIDE 50 MG: 50 TABLET, FILM COATED ORAL at 21:01

## 2022-01-13 RX ADMIN — ALBUTEROL SULFATE 2 PUFF: 108 INHALANT RESPIRATORY (INHALATION) at 11:26

## 2022-01-13 RX ADMIN — CALCIUM GLUCONATE 1 G: 20 INJECTION, SOLUTION INTRAVENOUS at 08:50

## 2022-01-13 RX ADMIN — INSULIN LISPRO 24 UNITS: 100 INJECTION, SOLUTION INTRAVENOUS; SUBCUTANEOUS at 00:44

## 2022-01-14 ENCOUNTER — APPOINTMENT (OUTPATIENT)
Dept: CT IMAGING | Facility: HOSPITAL | Age: 47
End: 2022-01-14

## 2022-01-14 LAB
ALBUMIN SERPL-MCNC: 2.7 G/DL (ref 3.5–5.2)
ALBUMIN SERPL-MCNC: 2.8 G/DL (ref 3.5–5.2)
ALBUMIN/GLOB SERPL: 0.6 G/DL
ALBUMIN/GLOB SERPL: 0.6 G/DL
ALP SERPL-CCNC: 679 U/L (ref 39–117)
ALP SERPL-CCNC: 697 U/L (ref 39–117)
ALT SERPL W P-5'-P-CCNC: 5 U/L (ref 1–33)
ALT SERPL W P-5'-P-CCNC: 5 U/L (ref 1–33)
ANION GAP SERPL CALCULATED.3IONS-SCNC: 10 MMOL/L (ref 5–15)
ANION GAP SERPL CALCULATED.3IONS-SCNC: 10 MMOL/L (ref 5–15)
ANION GAP SERPL CALCULATED.3IONS-SCNC: 11 MMOL/L (ref 5–15)
ANISOCYTOSIS BLD QL: ABNORMAL
AST SERPL-CCNC: 11 U/L (ref 1–32)
AST SERPL-CCNC: 11 U/L (ref 1–32)
BASOPHILS # BLD MANUAL: 10.11 10*3/MM3 (ref 0–0.2)
BASOPHILS # BLD MANUAL: 16.28 10*3/MM3 (ref 0–0.2)
BASOPHILS # BLD MANUAL: 5.72 10*3/MM3 (ref 0–0.2)
BASOPHILS NFR BLD MANUAL: 10 % (ref 0–1.5)
BASOPHILS NFR BLD MANUAL: 15 % (ref 0–1.5)
BASOPHILS NFR BLD MANUAL: 6 % (ref 0–1.5)
BH BB BLOOD EXPIRATION DATE: NORMAL
BH BB BLOOD TYPE BARCODE: 6200
BH BB DISPENSE STATUS: NORMAL
BH BB PRODUCT CODE: NORMAL
BH BB UNIT NUMBER: NORMAL
BILIRUB SERPL-MCNC: 0.6 MG/DL (ref 0–1.2)
BILIRUB SERPL-MCNC: 0.7 MG/DL (ref 0–1.2)
BLASTS NFR BLD MANUAL: 15 % (ref 0–0)
BLASTS NFR BLD MANUAL: 16 % (ref 0–0)
BLASTS NFR BLD MANUAL: 32 % (ref 0–0)
BUN SERPL-MCNC: 12 MG/DL (ref 6–20)
BUN SERPL-MCNC: 12 MG/DL (ref 6–20)
BUN SERPL-MCNC: 13 MG/DL (ref 6–20)
BUN/CREAT SERPL: 23.5 (ref 7–25)
BUN/CREAT SERPL: 27.1 (ref 7–25)
BUN/CREAT SERPL: 27.9 (ref 7–25)
CALCIUM SPEC-SCNC: 7.4 MG/DL (ref 8.6–10.5)
CALCIUM SPEC-SCNC: 7.5 MG/DL (ref 8.6–10.5)
CALCIUM SPEC-SCNC: 7.7 MG/DL (ref 8.6–10.5)
CHLORIDE SERPL-SCNC: 101 MMOL/L (ref 98–107)
CHLORIDE SERPL-SCNC: 101 MMOL/L (ref 98–107)
CHLORIDE SERPL-SCNC: 99 MMOL/L (ref 98–107)
CO2 SERPL-SCNC: 23 MMOL/L (ref 22–29)
CO2 SERPL-SCNC: 25 MMOL/L (ref 22–29)
CO2 SERPL-SCNC: 25 MMOL/L (ref 22–29)
CREAT SERPL-MCNC: 0.43 MG/DL (ref 0.57–1)
CREAT SERPL-MCNC: 0.48 MG/DL (ref 0.57–1)
CREAT SERPL-MCNC: 0.51 MG/DL (ref 0.57–1)
CROSSMATCH INTERPRETATION: NORMAL
DEPRECATED RDW RBC AUTO: 64.3 FL (ref 37–54)
DEPRECATED RDW RBC AUTO: 64.8 FL (ref 37–54)
DEPRECATED RDW RBC AUTO: 64.8 FL (ref 37–54)
EOSINOPHIL # BLD MANUAL: 4.34 10*3/MM3 (ref 0–0.4)
EOSINOPHIL # BLD MANUAL: 5.06 10*3/MM3 (ref 0–0.4)
EOSINOPHIL # BLD MANUAL: 5.72 10*3/MM3 (ref 0–0.4)
EOSINOPHIL NFR BLD MANUAL: 4 % (ref 0.3–6.2)
EOSINOPHIL NFR BLD MANUAL: 5 % (ref 0.3–6.2)
EOSINOPHIL NFR BLD MANUAL: 6 % (ref 0.3–6.2)
ERYTHROCYTE [DISTWIDTH] IN BLOOD BY AUTOMATED COUNT: 24.9 % (ref 12.3–15.4)
ERYTHROCYTE [DISTWIDTH] IN BLOOD BY AUTOMATED COUNT: 25.4 % (ref 12.3–15.4)
ERYTHROCYTE [DISTWIDTH] IN BLOOD BY AUTOMATED COUNT: 25.8 % (ref 12.3–15.4)
ERYTHROCYTE [SEDIMENTATION RATE] IN BLOOD: 35 MM/HR (ref 0–20)
GFR SERPL CREATININE-BSD FRML MDRD: 130 ML/MIN/1.73
GFR SERPL CREATININE-BSD FRML MDRD: 139 ML/MIN/1.73
GFR SERPL CREATININE-BSD FRML MDRD: >150 ML/MIN/1.73
GLOBULIN UR ELPH-MCNC: 4.6 GM/DL
GLOBULIN UR ELPH-MCNC: 4.6 GM/DL
GLUCOSE BLDC GLUCOMTR-MCNC: 167 MG/DL (ref 70–105)
GLUCOSE BLDC GLUCOMTR-MCNC: 195 MG/DL (ref 70–105)
GLUCOSE BLDC GLUCOMTR-MCNC: 199 MG/DL (ref 70–105)
GLUCOSE BLDC GLUCOMTR-MCNC: 237 MG/DL (ref 70–105)
GLUCOSE BLDC GLUCOMTR-MCNC: 254 MG/DL (ref 70–105)
GLUCOSE BLDC GLUCOMTR-MCNC: 402 MG/DL (ref 70–105)
GLUCOSE BLDC GLUCOMTR-MCNC: 407 MG/DL (ref 70–105)
GLUCOSE BLDC GLUCOMTR-MCNC: 461 MG/DL (ref 70–105)
GLUCOSE SERPL-MCNC: 201 MG/DL (ref 65–99)
GLUCOSE SERPL-MCNC: 230 MG/DL (ref 65–99)
GLUCOSE SERPL-MCNC: 498 MG/DL (ref 65–99)
HCT VFR BLD AUTO: 23.9 % (ref 34–46.6)
HCT VFR BLD AUTO: 24.2 % (ref 34–46.6)
HCT VFR BLD AUTO: 25.3 % (ref 34–46.6)
HGB BLD-MCNC: 7.6 G/DL (ref 12–15.9)
HGB BLD-MCNC: 7.8 G/DL (ref 12–15.9)
HGB BLD-MCNC: 7.8 G/DL (ref 12–15.9)
LDH SERPL-CCNC: 1817 U/L (ref 135–214)
LDH SERPL-CCNC: 1862 U/L (ref 135–214)
LYMPHOCYTES # BLD MANUAL: 4.04 10*3/MM3 (ref 0.7–3.1)
LYMPHOCYTES # BLD MANUAL: 5.72 10*3/MM3 (ref 0.7–3.1)
LYMPHOCYTES # BLD MANUAL: 7.6 10*3/MM3 (ref 0.7–3.1)
LYMPHOCYTES NFR BLD MANUAL: 1 % (ref 5–12)
LYMPHOCYTES NFR BLD MANUAL: 4 % (ref 5–12)
LYMPHOCYTES NFR BLD MANUAL: 6 % (ref 5–12)
MAGNESIUM SERPL-MCNC: 1.8 MG/DL (ref 1.6–2.6)
MCH RBC QN AUTO: 23.5 PG (ref 26.6–33)
MCH RBC QN AUTO: 23.6 PG (ref 26.6–33)
MCH RBC QN AUTO: 24.3 PG (ref 26.6–33)
MCHC RBC AUTO-ENTMCNC: 30.9 G/DL (ref 31.5–35.7)
MCHC RBC AUTO-ENTMCNC: 31.9 G/DL (ref 31.5–35.7)
MCHC RBC AUTO-ENTMCNC: 32.1 G/DL (ref 31.5–35.7)
MCV RBC AUTO: 74.1 FL (ref 79–97)
MCV RBC AUTO: 75.8 FL (ref 79–97)
MCV RBC AUTO: 76 FL (ref 79–97)
METAMYELOCYTES NFR BLD MANUAL: 3 % (ref 0–0)
METAMYELOCYTES NFR BLD MANUAL: 3 % (ref 0–0)
METAMYELOCYTES NFR BLD MANUAL: 5 % (ref 0–0)
MICROCYTES BLD QL: ABNORMAL
MONOCYTES # BLD: 1.01 10*3/MM3 (ref 0.1–0.9)
MONOCYTES # BLD: 3.81 10*3/MM3 (ref 0.1–0.9)
MONOCYTES # BLD: 6.51 10*3/MM3 (ref 0.1–0.9)
MYELOCYTES NFR BLD MANUAL: 2 % (ref 0–0)
MYELOCYTES NFR BLD MANUAL: 4 % (ref 0–0)
MYELOCYTES NFR BLD MANUAL: 6 % (ref 0–0)
NEUTROPHILS # BLD AUTO: 39.43 10*3/MM3 (ref 1.7–7)
NEUTROPHILS # BLD AUTO: 47.74 10*3/MM3 (ref 1.7–7)
NEUTROPHILS # BLD AUTO: 54.32 10*3/MM3 (ref 1.7–7)
NEUTROPHILS NFR BLD MANUAL: 32 % (ref 42.7–76)
NEUTROPHILS NFR BLD MANUAL: 34 % (ref 42.7–76)
NEUTROPHILS NFR BLD MANUAL: 44 % (ref 42.7–76)
NEUTS BAND NFR BLD MANUAL: 12 % (ref 0–5)
NEUTS BAND NFR BLD MANUAL: 13 % (ref 0–5)
NEUTS BAND NFR BLD MANUAL: 5 % (ref 0–5)
NRBC SPEC MANUAL: 5 /100 WBC (ref 0–0.2)
PATHOLOGY REVIEW: YES
PHOSPHATE SERPL-MCNC: 2.3 MG/DL (ref 2.5–4.5)
PHOSPHATE SERPL-MCNC: 2.9 MG/DL (ref 2.5–4.5)
PLAT MORPH BLD: NORMAL
PLATELET # BLD AUTO: 499 10*3/MM3 (ref 140–450)
PLATELET # BLD AUTO: 510 10*3/MM3 (ref 140–450)
PLATELET # BLD AUTO: 520 10*3/MM3 (ref 140–450)
PMV BLD AUTO: 7.6 FL (ref 6–12)
PMV BLD AUTO: 7.6 FL (ref 6–12)
PMV BLD AUTO: 7.8 FL (ref 6–12)
POIKILOCYTOSIS BLD QL SMEAR: ABNORMAL
POIKILOCYTOSIS BLD QL SMEAR: ABNORMAL
POTASSIUM SERPL-SCNC: 3.4 MMOL/L (ref 3.5–5.2)
POTASSIUM SERPL-SCNC: 3.6 MMOL/L (ref 3.5–5.2)
POTASSIUM SERPL-SCNC: 3.9 MMOL/L (ref 3.5–5.2)
PROT SERPL-MCNC: 7.3 G/DL (ref 6–8.5)
PROT SERPL-MCNC: 7.4 G/DL (ref 6–8.5)
QT INTERVAL: 332 MS
RBC # BLD AUTO: 3.19 10*6/MM3 (ref 3.77–5.28)
RBC # BLD AUTO: 3.23 10*6/MM3 (ref 3.77–5.28)
RBC # BLD AUTO: 3.33 10*6/MM3 (ref 3.77–5.28)
SCAN SLIDE: NORMAL
SMALL PLATELETS BLD QL SMEAR: ABNORMAL
SMALL PLATELETS BLD QL SMEAR: ABNORMAL
SODIUM SERPL-SCNC: 133 MMOL/L (ref 136–145)
SODIUM SERPL-SCNC: 136 MMOL/L (ref 136–145)
SODIUM SERPL-SCNC: 136 MMOL/L (ref 136–145)
UNIT  ABO: NORMAL
UNIT  RH: NORMAL
URATE SERPL-MCNC: 8.2 MG/DL (ref 2.4–5.7)
URATE SERPL-MCNC: 9.2 MG/DL (ref 2.4–5.7)
VARIANT LYMPHS NFR BLD MANUAL: 0 % (ref 0–5)
VARIANT LYMPHS NFR BLD MANUAL: 4 % (ref 19.6–45.3)
VARIANT LYMPHS NFR BLD MANUAL: 6 % (ref 19.6–45.3)
VARIANT LYMPHS NFR BLD MANUAL: 7 % (ref 19.6–45.3)
VIT B12 BLD-MCNC: >2000 PG/ML (ref 211–946)
WBC MORPH BLD: NORMAL
WBC NRBC COR # BLD: 101.1 10*3/MM3 (ref 3.4–10.8)
WBC NRBC COR # BLD: 108.5 10*3/MM3 (ref 3.4–10.8)
WBC NRBC COR # BLD: 95.3 10*3/MM3 (ref 3.4–10.8)

## 2022-01-14 PROCEDURE — 25010000002 FENTANYL CITRATE (PF) 50 MCG/ML SOLUTION: Performed by: RADIOLOGY

## 2022-01-14 PROCEDURE — 82962 GLUCOSE BLOOD TEST: CPT

## 2022-01-14 PROCEDURE — 77012 CT SCAN FOR NEEDLE BIOPSY: CPT

## 2022-01-14 PROCEDURE — 0 LIDOCAINE 1 % SOLUTION: Performed by: RADIOLOGY

## 2022-01-14 PROCEDURE — 94799 UNLISTED PULMONARY SVC/PX: CPT

## 2022-01-14 PROCEDURE — 85025 COMPLETE CBC W/AUTO DIFF WBC: CPT | Performed by: INTERNAL MEDICINE

## 2022-01-14 PROCEDURE — 99231 SBSQ HOSP IP/OBS SF/LOW 25: CPT | Performed by: INTERNAL MEDICINE

## 2022-01-14 PROCEDURE — 83615 LACTATE (LD) (LDH) ENZYME: CPT | Performed by: INTERNAL MEDICINE

## 2022-01-14 PROCEDURE — 88311 DECALCIFY TISSUE: CPT | Performed by: INTERNAL MEDICINE

## 2022-01-14 PROCEDURE — 07DR3ZX EXTRACTION OF ILIAC BONE MARROW, PERCUTANEOUS APPROACH, DIAGNOSTIC: ICD-10-PCS | Performed by: RADIOLOGY

## 2022-01-14 PROCEDURE — 99152 MOD SED SAME PHYS/QHP 5/>YRS: CPT

## 2022-01-14 PROCEDURE — 82607 VITAMIN B-12: CPT | Performed by: INTERNAL MEDICINE

## 2022-01-14 PROCEDURE — 84550 ASSAY OF BLOOD/URIC ACID: CPT | Performed by: INTERNAL MEDICINE

## 2022-01-14 PROCEDURE — 99232 SBSQ HOSP IP/OBS MODERATE 35: CPT | Performed by: INTERNAL MEDICINE

## 2022-01-14 PROCEDURE — 88184 FLOWCYTOMETRY/ TC 1 MARKER: CPT

## 2022-01-14 PROCEDURE — 81207 BCR/ABL1 GENE MINOR BP: CPT

## 2022-01-14 PROCEDURE — 88313 SPECIAL STAINS GROUP 2: CPT

## 2022-01-14 PROCEDURE — 88237 TISSUE CULTURE BONE MARROW: CPT | Performed by: INTERNAL MEDICINE

## 2022-01-14 PROCEDURE — 88237 TISSUE CULTURE BONE MARROW: CPT

## 2022-01-14 PROCEDURE — 25010000002 CEFTRIAXONE PER 250 MG: Performed by: INTERNAL MEDICINE

## 2022-01-14 PROCEDURE — 86038 ANTINUCLEAR ANTIBODIES: CPT | Performed by: INTERNAL MEDICINE

## 2022-01-14 PROCEDURE — 88305 TISSUE EXAM BY PATHOLOGIST: CPT | Performed by: INTERNAL MEDICINE

## 2022-01-14 PROCEDURE — 85097 BONE MARROW INTERPRETATION: CPT | Performed by: INTERNAL MEDICINE

## 2022-01-14 PROCEDURE — 88184 FLOWCYTOMETRY/ TC 1 MARKER: CPT | Performed by: INTERNAL MEDICINE

## 2022-01-14 PROCEDURE — 84100 ASSAY OF PHOSPHORUS: CPT | Performed by: INTERNAL MEDICINE

## 2022-01-14 PROCEDURE — 85652 RBC SED RATE AUTOMATED: CPT | Performed by: INTERNAL MEDICINE

## 2022-01-14 PROCEDURE — 88271 CYTOGENETICS DNA PROBE: CPT

## 2022-01-14 PROCEDURE — 88342 IMHCHEM/IMCYTCHM 1ST ANTB: CPT

## 2022-01-14 PROCEDURE — 88313 SPECIAL STAINS GROUP 2: CPT | Performed by: INTERNAL MEDICINE

## 2022-01-14 PROCEDURE — 85007 BL SMEAR W/DIFF WBC COUNT: CPT | Performed by: INTERNAL MEDICINE

## 2022-01-14 PROCEDURE — 80053 COMPREHEN METABOLIC PANEL: CPT | Performed by: INTERNAL MEDICINE

## 2022-01-14 PROCEDURE — 88185 FLOWCYTOMETRY/TC ADD-ON: CPT

## 2022-01-14 PROCEDURE — 88264 CHROMOSOME ANALYSIS 20-25: CPT | Performed by: INTERNAL MEDICINE

## 2022-01-14 PROCEDURE — 81270 JAK2 GENE: CPT

## 2022-01-14 PROCEDURE — 88264 CHROMOSOME ANALYSIS 20-25: CPT

## 2022-01-14 PROCEDURE — 81206 BCR/ABL1 GENE MAJOR BP: CPT

## 2022-01-14 PROCEDURE — 99233 SBSQ HOSP IP/OBS HIGH 50: CPT | Performed by: INTERNAL MEDICINE

## 2022-01-14 PROCEDURE — 88323 CONSLTJ&REPRT MATRL PREP SLD: CPT

## 2022-01-14 PROCEDURE — 25010000002 ONDANSETRON PER 1 MG: Performed by: INTERNAL MEDICINE

## 2022-01-14 PROCEDURE — 88275 CYTOGENETICS 100-300: CPT

## 2022-01-14 PROCEDURE — 81170 ABL1 GENE: CPT

## 2022-01-14 PROCEDURE — 63710000001 INSULIN LISPRO (HUMAN) PER 5 UNITS: Performed by: INTERNAL MEDICINE

## 2022-01-14 PROCEDURE — 63710000001 INSULIN ISOPHANE & REGULAR PER 5 UNITS: Performed by: INTERNAL MEDICINE

## 2022-01-14 PROCEDURE — 25010000002 FUROSEMIDE PER 20 MG: Performed by: INTERNAL MEDICINE

## 2022-01-14 PROCEDURE — 83735 ASSAY OF MAGNESIUM: CPT | Performed by: INTERNAL MEDICINE

## 2022-01-14 PROCEDURE — 88185 FLOWCYTOMETRY/TC ADD-ON: CPT | Performed by: INTERNAL MEDICINE

## 2022-01-14 PROCEDURE — 88341 IMHCHEM/IMCYTCHM EA ADD ANTB: CPT

## 2022-01-14 RX ORDER — LIDOCAINE HYDROCHLORIDE 10 MG/ML
INJECTION, SOLUTION INFILTRATION; PERINEURAL
Status: COMPLETED | OUTPATIENT
Start: 2022-01-14 | End: 2022-01-14

## 2022-01-14 RX ORDER — ALLOPURINOL 300 MG/1
300 TABLET ORAL DAILY
Status: DISCONTINUED | OUTPATIENT
Start: 2022-01-14 | End: 2022-01-17 | Stop reason: HOSPADM

## 2022-01-14 RX ORDER — FENTANYL CITRATE 50 UG/ML
INJECTION, SOLUTION INTRAMUSCULAR; INTRAVENOUS
Status: COMPLETED | OUTPATIENT
Start: 2022-01-14 | End: 2022-01-14

## 2022-01-14 RX ORDER — FENTANYL CITRATE 50 UG/ML
INJECTION, SOLUTION INTRAMUSCULAR; INTRAVENOUS
Status: DISPENSED
Start: 2022-01-14 | End: 2022-01-15

## 2022-01-14 RX ORDER — LIDOCAINE HYDROCHLORIDE 10 MG/ML
INJECTION, SOLUTION INFILTRATION; PERINEURAL
Status: DISPENSED
Start: 2022-01-14 | End: 2022-01-15

## 2022-01-14 RX ADMIN — INSULIN HUMAN 25 UNITS: 100 INJECTION, SUSPENSION SUBCUTANEOUS at 09:32

## 2022-01-14 RX ADMIN — SODIUM CHLORIDE, PRESERVATIVE FREE 10 ML: 5 INJECTION INTRAVENOUS at 20:53

## 2022-01-14 RX ADMIN — ALLOPURINOL 300 MG: 300 TABLET ORAL at 17:58

## 2022-01-14 RX ADMIN — OXYCODONE 5 MG: 5 TABLET ORAL at 15:54

## 2022-01-14 RX ADMIN — ALBUTEROL SULFATE 2 PUFF: 108 INHALANT RESPIRATORY (INHALATION) at 09:43

## 2022-01-14 RX ADMIN — FENTANYL CITRATE 100 MCG: 50 INJECTION, SOLUTION INTRAMUSCULAR; INTRAVENOUS at 14:54

## 2022-01-14 RX ADMIN — Medication 220 MG: at 20:52

## 2022-01-14 RX ADMIN — BUDESONIDE AND FORMOTEROL FUMARATE DIHYDRATE 2 PUFF: 160; 4.5 AEROSOL RESPIRATORY (INHALATION) at 09:46

## 2022-01-14 RX ADMIN — OXYCODONE HYDROCHLORIDE AND ACETAMINOPHEN 1000 MG: 500 TABLET ORAL at 20:52

## 2022-01-14 RX ADMIN — POTASSIUM CHLORIDE 40 MEQ: 1500 TABLET, EXTENDED RELEASE ORAL at 05:46

## 2022-01-14 RX ADMIN — FENTANYL CITRATE 100 MCG: 50 INJECTION, SOLUTION INTRAMUSCULAR; INTRAVENOUS at 14:30

## 2022-01-14 RX ADMIN — Medication 5000 UNITS: at 20:52

## 2022-01-14 RX ADMIN — FUROSEMIDE 20 MG: 20 INJECTION, SOLUTION INTRAMUSCULAR; INTRAVENOUS at 05:47

## 2022-01-14 RX ADMIN — SODIUM CHLORIDE, PRESERVATIVE FREE 10 ML: 5 INJECTION INTRAVENOUS at 09:32

## 2022-01-14 RX ADMIN — INSULIN LISPRO 8 UNITS: 100 INJECTION, SOLUTION INTRAVENOUS; SUBCUTANEOUS at 20:52

## 2022-01-14 RX ADMIN — FUROSEMIDE 20 MG: 20 INJECTION, SOLUTION INTRAMUSCULAR; INTRAVENOUS at 17:58

## 2022-01-14 RX ADMIN — BUDESONIDE AND FORMOTEROL FUMARATE DIHYDRATE 2 PUFF: 160; 4.5 AEROSOL RESPIRATORY (INHALATION) at 20:31

## 2022-01-14 RX ADMIN — OXYCODONE 5 MG: 5 TABLET ORAL at 20:52

## 2022-01-14 RX ADMIN — LIDOCAINE HYDROCHLORIDE 10 ML: 10 INJECTION, SOLUTION INFILTRATION; PERINEURAL at 14:39

## 2022-01-14 RX ADMIN — INSULIN LISPRO 4 UNITS: 100 INJECTION, SOLUTION INTRAVENOUS; SUBCUTANEOUS at 09:33

## 2022-01-14 RX ADMIN — DOCUSATE SODIUM AND SENNOSIDES 2 TABLET: 8.6; 5 TABLET, FILM COATED ORAL at 20:52

## 2022-01-14 RX ADMIN — FENTANYL CITRATE 100 MCG: 50 INJECTION, SOLUTION INTRAMUSCULAR; INTRAVENOUS at 14:37

## 2022-01-14 RX ADMIN — DOCUSATE SODIUM AND SENNOSIDES 2 TABLET: 8.6; 5 TABLET, FILM COATED ORAL at 09:33

## 2022-01-14 RX ADMIN — POTASSIUM CHLORIDE 40 MEQ: 1500 TABLET, EXTENDED RELEASE ORAL at 09:34

## 2022-01-14 RX ADMIN — INSULIN LISPRO 4 UNITS: 100 INJECTION, SOLUTION INTRAVENOUS; SUBCUTANEOUS at 17:58

## 2022-01-14 RX ADMIN — ONDANSETRON 4 MG: 2 INJECTION INTRAMUSCULAR; INTRAVENOUS at 15:06

## 2022-01-14 RX ADMIN — HYDROXYUREA 500 MG: 500 CAPSULE ORAL at 09:33

## 2022-01-14 RX ADMIN — CEFTRIAXONE 1 G: 1 INJECTION, POWDER, FOR SOLUTION INTRAMUSCULAR; INTRAVENOUS at 17:59

## 2022-01-14 RX ADMIN — ALBUTEROL SULFATE 2 PUFF: 108 INHALANT RESPIRATORY (INHALATION) at 20:27

## 2022-01-14 RX ADMIN — ALBUTEROL SULFATE 2 PUFF: 108 INHALANT RESPIRATORY (INHALATION) at 16:32

## 2022-01-14 RX ADMIN — INSULIN LISPRO 24 UNITS: 100 INJECTION, SOLUTION INTRAVENOUS; SUBCUTANEOUS at 14:08

## 2022-01-14 RX ADMIN — INSULIN LISPRO 20 UNITS: 100 INJECTION, SOLUTION INTRAVENOUS; SUBCUTANEOUS at 12:02

## 2022-01-14 RX ADMIN — FAMOTIDINE 40 MG: 20 TABLET ORAL at 09:33

## 2022-01-14 RX ADMIN — INSULIN HUMAN 30 UNITS: 100 INJECTION, SUSPENSION SUBCUTANEOUS at 17:30

## 2022-01-14 RX ADMIN — HYDROXYUREA 500 MG: 500 CAPSULE ORAL at 20:53

## 2022-01-14 RX ADMIN — AMITRIPTYLINE HYDROCHLORIDE 50 MG: 50 TABLET, FILM COATED ORAL at 20:52

## 2022-01-15 LAB
ALBUMIN SERPL-MCNC: 2.6 G/DL (ref 3.5–5.2)
ALBUMIN/GLOB SERPL: 0.6 G/DL
ALP SERPL-CCNC: 610 U/L (ref 39–117)
ALT SERPL W P-5'-P-CCNC: <5 U/L (ref 1–33)
ANION GAP SERPL CALCULATED.3IONS-SCNC: 7 MMOL/L (ref 5–15)
ANISOCYTOSIS BLD QL: ABNORMAL
AST SERPL-CCNC: 12 U/L (ref 1–32)
BASOPHILS # BLD MANUAL: 2.21 10*3/MM3 (ref 0–0.2)
BASOPHILS # BLD MANUAL: 4.4 10*3/MM3 (ref 0–0.2)
BASOPHILS NFR BLD MANUAL: 2 % (ref 0–1.5)
BASOPHILS NFR BLD MANUAL: 5 % (ref 0–1.5)
BILIRUB SERPL-MCNC: 0.6 MG/DL (ref 0–1.2)
BLASTS NFR BLD MANUAL: 11 % (ref 0–0)
BLASTS NFR BLD MANUAL: 24 % (ref 0–0)
BLASTS NFR BLD MANUAL: 24 % (ref 0–0)
BUN SERPL-MCNC: 10 MG/DL (ref 6–20)
BUN/CREAT SERPL: 25 (ref 7–25)
CALCIUM SPEC-SCNC: 7.9 MG/DL (ref 8.6–10.5)
CHLORIDE SERPL-SCNC: 104 MMOL/L (ref 98–107)
CO2 SERPL-SCNC: 26 MMOL/L (ref 22–29)
CREAT SERPL-MCNC: 0.4 MG/DL (ref 0.57–1)
DEPRECATED RDW RBC AUTO: 64.3 FL (ref 37–54)
DEPRECATED RDW RBC AUTO: 65.6 FL (ref 37–54)
DEPRECATED RDW RBC AUTO: 66.1 FL (ref 37–54)
EOSINOPHIL # BLD MANUAL: 3.52 10*3/MM3 (ref 0–0.4)
EOSINOPHIL # BLD MANUAL: 6.67 10*3/MM3 (ref 0–0.4)
EOSINOPHIL # BLD MANUAL: 7.72 10*3/MM3 (ref 0–0.4)
EOSINOPHIL NFR BLD MANUAL: 4 % (ref 0.3–6.2)
EOSINOPHIL NFR BLD MANUAL: 7 % (ref 0.3–6.2)
EOSINOPHIL NFR BLD MANUAL: 7 % (ref 0.3–6.2)
ERYTHROCYTE [DISTWIDTH] IN BLOOD BY AUTOMATED COUNT: 25.4 % (ref 12.3–15.4)
ERYTHROCYTE [DISTWIDTH] IN BLOOD BY AUTOMATED COUNT: 25.7 % (ref 12.3–15.4)
ERYTHROCYTE [DISTWIDTH] IN BLOOD BY AUTOMATED COUNT: 26 % (ref 12.3–15.4)
GFR SERPL CREATININE-BSD FRML MDRD: >150 ML/MIN/1.73
GLOBULIN UR ELPH-MCNC: 4.3 GM/DL
GLUCOSE BLDC GLUCOMTR-MCNC: 152 MG/DL (ref 70–105)
GLUCOSE BLDC GLUCOMTR-MCNC: 168 MG/DL (ref 70–105)
GLUCOSE BLDC GLUCOMTR-MCNC: 206 MG/DL (ref 70–105)
GLUCOSE BLDC GLUCOMTR-MCNC: 340 MG/DL (ref 70–105)
GLUCOSE BLDC GLUCOMTR-MCNC: 449 MG/DL (ref 70–105)
GLUCOSE SERPL-MCNC: 161 MG/DL (ref 65–99)
HCT VFR BLD AUTO: 22.8 % (ref 34–46.6)
HCT VFR BLD AUTO: 23.5 % (ref 34–46.6)
HCT VFR BLD AUTO: 25.3 % (ref 34–46.6)
HGB BLD-MCNC: 7.3 G/DL (ref 12–15.9)
HGB BLD-MCNC: 7.4 G/DL (ref 12–15.9)
HGB BLD-MCNC: 8.1 G/DL (ref 12–15.9)
LDH SERPL-CCNC: 1616 U/L (ref 135–214)
LYMPHOCYTES # BLD MANUAL: 11.43 10*3/MM3 (ref 0.7–3.1)
LYMPHOCYTES # BLD MANUAL: 12.13 10*3/MM3 (ref 0.7–3.1)
LYMPHOCYTES # BLD MANUAL: 5.72 10*3/MM3 (ref 0.7–3.1)
LYMPHOCYTES NFR BLD MANUAL: 1 % (ref 5–12)
LYMPHOCYTES NFR BLD MANUAL: 2 % (ref 5–12)
LYMPHOCYTES NFR BLD MANUAL: 5 % (ref 5–12)
MCH RBC QN AUTO: 23.9 PG (ref 26.6–33)
MCH RBC QN AUTO: 24.1 PG (ref 26.6–33)
MCH RBC QN AUTO: 24.3 PG (ref 26.6–33)
MCHC RBC AUTO-ENTMCNC: 31.3 G/DL (ref 31.5–35.7)
MCHC RBC AUTO-ENTMCNC: 31.9 G/DL (ref 31.5–35.7)
MCHC RBC AUTO-ENTMCNC: 31.9 G/DL (ref 31.5–35.7)
MCV RBC AUTO: 75.7 FL (ref 79–97)
MCV RBC AUTO: 76.2 FL (ref 79–97)
MCV RBC AUTO: 76.2 FL (ref 79–97)
METAMYELOCYTES NFR BLD MANUAL: 2 % (ref 0–0)
METAMYELOCYTES NFR BLD MANUAL: 2 % (ref 0–0)
METAMYELOCYTES NFR BLD MANUAL: 6 % (ref 0–0)
MICROCYTES BLD QL: ABNORMAL
MONOCYTES # BLD: 0.95 10*3/MM3 (ref 0.1–0.9)
MONOCYTES # BLD: 2.21 10*3/MM3 (ref 0.1–0.9)
MONOCYTES # BLD: 4.4 10*3/MM3 (ref 0.1–0.9)
MYELOCYTES NFR BLD MANUAL: 4 % (ref 0–0)
MYELOCYTES NFR BLD MANUAL: 5 % (ref 0–0)
MYELOCYTES NFR BLD MANUAL: 6 % (ref 0–0)
NEUTROPHILS # BLD AUTO: 35.16 10*3/MM3 (ref 1.7–7)
NEUTROPHILS # BLD AUTO: 49.56 10*3/MM3 (ref 1.7–7)
NEUTROPHILS # BLD AUTO: 65.08 10*3/MM3 (ref 1.7–7)
NEUTROPHILS NFR BLD MANUAL: 29 % (ref 42.7–76)
NEUTROPHILS NFR BLD MANUAL: 44 % (ref 42.7–76)
NEUTROPHILS NFR BLD MANUAL: 50 % (ref 42.7–76)
NEUTS BAND NFR BLD MANUAL: 11 % (ref 0–5)
NEUTS BAND NFR BLD MANUAL: 8 % (ref 0–5)
NEUTS BAND NFR BLD MANUAL: 9 % (ref 0–5)
NRBC SPEC MANUAL: 1 /100 WBC (ref 0–0.2)
NRBC SPEC MANUAL: 1 /100 WBC (ref 0–0.2)
PHOSPHATE SERPL-MCNC: 3.4 MG/DL (ref 2.5–4.5)
PLATELET # BLD AUTO: 473 10*3/MM3 (ref 140–450)
PLATELET # BLD AUTO: 488 10*3/MM3 (ref 140–450)
PLATELET # BLD AUTO: 546 10*3/MM3 (ref 140–450)
PMV BLD AUTO: 7.9 FL (ref 6–12)
PMV BLD AUTO: 8 FL (ref 6–12)
PMV BLD AUTO: 8.2 FL (ref 6–12)
POIKILOCYTOSIS BLD QL SMEAR: ABNORMAL
POTASSIUM SERPL-SCNC: 3.9 MMOL/L (ref 3.5–5.2)
PROMYELOCYTES NFR BLD MANUAL: 2 % (ref 0–0)
PROT SERPL-MCNC: 6.9 G/DL (ref 6–8.5)
RBC # BLD AUTO: 3.01 10*6/MM3 (ref 3.77–5.28)
RBC # BLD AUTO: 3.08 10*6/MM3 (ref 3.77–5.28)
RBC # BLD AUTO: 3.31 10*6/MM3 (ref 3.77–5.28)
SCAN SLIDE: NORMAL
SMALL PLATELETS BLD QL SMEAR: ABNORMAL
SODIUM SERPL-SCNC: 137 MMOL/L (ref 136–145)
URATE SERPL-MCNC: 7.4 MG/DL (ref 2.4–5.7)
VARIANT LYMPHS NFR BLD MANUAL: 11 % (ref 19.6–45.3)
VARIANT LYMPHS NFR BLD MANUAL: 13 % (ref 19.6–45.3)
VARIANT LYMPHS NFR BLD MANUAL: 6 % (ref 19.6–45.3)
WBC MORPH BLD: NORMAL
WBC NRBC COR # BLD: 110.3 10*3/MM3 (ref 3.4–10.8)
WBC NRBC COR # BLD: 87.9 10*3/MM3 (ref 3.4–10.8)
WBC NRBC COR # BLD: 95.3 10*3/MM3 (ref 3.4–10.8)

## 2022-01-15 PROCEDURE — 99231 SBSQ HOSP IP/OBS SF/LOW 25: CPT | Performed by: INTERNAL MEDICINE

## 2022-01-15 PROCEDURE — 94799 UNLISTED PULMONARY SVC/PX: CPT

## 2022-01-15 PROCEDURE — 25010000002 ENOXAPARIN PER 10 MG: Performed by: INTERNAL MEDICINE

## 2022-01-15 PROCEDURE — 83615 LACTATE (LD) (LDH) ENZYME: CPT | Performed by: INTERNAL MEDICINE

## 2022-01-15 PROCEDURE — 25010000002 CEFTRIAXONE PER 250 MG: Performed by: INTERNAL MEDICINE

## 2022-01-15 PROCEDURE — 80053 COMPREHEN METABOLIC PANEL: CPT | Performed by: INTERNAL MEDICINE

## 2022-01-15 PROCEDURE — 84550 ASSAY OF BLOOD/URIC ACID: CPT | Performed by: INTERNAL MEDICINE

## 2022-01-15 PROCEDURE — 63710000001 INSULIN ISOPHANE & REGULAR PER 5 UNITS: Performed by: INTERNAL MEDICINE

## 2022-01-15 PROCEDURE — 85025 COMPLETE CBC W/AUTO DIFF WBC: CPT | Performed by: INTERNAL MEDICINE

## 2022-01-15 PROCEDURE — 99233 SBSQ HOSP IP/OBS HIGH 50: CPT | Performed by: INTERNAL MEDICINE

## 2022-01-15 PROCEDURE — 85007 BL SMEAR W/DIFF WBC COUNT: CPT | Performed by: INTERNAL MEDICINE

## 2022-01-15 PROCEDURE — 82962 GLUCOSE BLOOD TEST: CPT

## 2022-01-15 PROCEDURE — 25010000002 FUROSEMIDE PER 20 MG: Performed by: INTERNAL MEDICINE

## 2022-01-15 PROCEDURE — 84100 ASSAY OF PHOSPHORUS: CPT | Performed by: INTERNAL MEDICINE

## 2022-01-15 PROCEDURE — 63710000001 INSULIN LISPRO (HUMAN) PER 5 UNITS: Performed by: INTERNAL MEDICINE

## 2022-01-15 RX ORDER — SODIUM CHLORIDE, SODIUM LACTATE, POTASSIUM CHLORIDE, CALCIUM CHLORIDE 600; 310; 30; 20 MG/100ML; MG/100ML; MG/100ML; MG/100ML
75 INJECTION, SOLUTION INTRAVENOUS CONTINUOUS
Status: DISCONTINUED | OUTPATIENT
Start: 2022-01-15 | End: 2022-01-16

## 2022-01-15 RX ADMIN — CEFTRIAXONE 1 G: 1 INJECTION, POWDER, FOR SOLUTION INTRAMUSCULAR; INTRAVENOUS at 16:55

## 2022-01-15 RX ADMIN — OXYCODONE HYDROCHLORIDE AND ACETAMINOPHEN 1000 MG: 500 TABLET ORAL at 20:07

## 2022-01-15 RX ADMIN — OXYCODONE 5 MG: 5 TABLET ORAL at 11:58

## 2022-01-15 RX ADMIN — ALBUTEROL SULFATE 2 PUFF: 108 INHALANT RESPIRATORY (INHALATION) at 20:34

## 2022-01-15 RX ADMIN — DOCUSATE SODIUM AND SENNOSIDES 2 TABLET: 8.6; 5 TABLET, FILM COATED ORAL at 08:15

## 2022-01-15 RX ADMIN — AMITRIPTYLINE HYDROCHLORIDE 50 MG: 50 TABLET, FILM COATED ORAL at 20:06

## 2022-01-15 RX ADMIN — SODIUM CHLORIDE, PRESERVATIVE FREE 10 ML: 5 INJECTION INTRAVENOUS at 08:15

## 2022-01-15 RX ADMIN — FUROSEMIDE 20 MG: 20 INJECTION, SOLUTION INTRAMUSCULAR; INTRAVENOUS at 06:01

## 2022-01-15 RX ADMIN — Medication 5000 UNITS: at 20:06

## 2022-01-15 RX ADMIN — SODIUM CHLORIDE, POTASSIUM CHLORIDE, SODIUM LACTATE AND CALCIUM CHLORIDE 75 ML/HR: 600; 310; 30; 20 INJECTION, SOLUTION INTRAVENOUS at 15:04

## 2022-01-15 RX ADMIN — BUDESONIDE AND FORMOTEROL FUMARATE DIHYDRATE 2 PUFF: 160; 4.5 AEROSOL RESPIRATORY (INHALATION) at 20:38

## 2022-01-15 RX ADMIN — ENOXAPARIN SODIUM 40 MG: 40 INJECTION SUBCUTANEOUS at 16:56

## 2022-01-15 RX ADMIN — HYDROXYUREA 500 MG: 500 CAPSULE ORAL at 08:17

## 2022-01-15 RX ADMIN — INSULIN LISPRO 4 UNITS: 100 INJECTION, SOLUTION INTRAVENOUS; SUBCUTANEOUS at 08:15

## 2022-01-15 RX ADMIN — OXYCODONE 5 MG: 5 TABLET ORAL at 19:31

## 2022-01-15 RX ADMIN — ALBUTEROL SULFATE 2 PUFF: 108 INHALANT RESPIRATORY (INHALATION) at 10:56

## 2022-01-15 RX ADMIN — FAMOTIDINE 40 MG: 20 TABLET ORAL at 08:15

## 2022-01-15 RX ADMIN — ALLOPURINOL 300 MG: 300 TABLET ORAL at 08:15

## 2022-01-15 RX ADMIN — INSULIN HUMAN 30 UNITS: 100 INJECTION, SUSPENSION SUBCUTANEOUS at 17:37

## 2022-01-15 RX ADMIN — INSULIN HUMAN 30 UNITS: 100 INJECTION, SUSPENSION SUBCUTANEOUS at 08:15

## 2022-01-15 RX ADMIN — FUROSEMIDE 20 MG: 20 INJECTION, SOLUTION INTRAMUSCULAR; INTRAVENOUS at 17:37

## 2022-01-15 RX ADMIN — INSULIN LISPRO 16 UNITS: 100 INJECTION, SOLUTION INTRAVENOUS; SUBCUTANEOUS at 17:37

## 2022-01-15 RX ADMIN — INSULIN LISPRO 24 UNITS: 100 INJECTION, SOLUTION INTRAVENOUS; SUBCUTANEOUS at 20:07

## 2022-01-15 RX ADMIN — HYDROXYUREA 500 MG: 500 CAPSULE ORAL at 20:07

## 2022-01-15 RX ADMIN — ALBUTEROL SULFATE 2 PUFF: 108 INHALANT RESPIRATORY (INHALATION) at 14:32

## 2022-01-16 LAB
ABO GROUP BLD: NORMAL
ALBUMIN SERPL-MCNC: 2.8 G/DL (ref 3.5–5.2)
ALBUMIN/GLOB SERPL: 0.7 G/DL
ALP SERPL-CCNC: 633 U/L (ref 39–117)
ALT SERPL W P-5'-P-CCNC: 6 U/L (ref 1–33)
ANION GAP SERPL CALCULATED.3IONS-SCNC: 9 MMOL/L (ref 5–15)
ANISOCYTOSIS BLD QL: ABNORMAL
ANISOCYTOSIS BLD QL: ABNORMAL
ANTI-E: NORMAL
ANTI-JKA: NORMAL
AST SERPL-CCNC: 14 U/L (ref 1–32)
BASOPHILS # BLD MANUAL: 6.74 10*3/MM3 (ref 0–0.2)
BASOPHILS # BLD MANUAL: 8.15 10*3/MM3 (ref 0–0.2)
BASOPHILS NFR BLD MANUAL: 7 % (ref 0–1.5)
BASOPHILS NFR BLD MANUAL: 8 % (ref 0–1.5)
BH BB BLOOD EXPIRATION DATE: NORMAL
BH BB BLOOD TYPE BARCODE: 600
BH BB BLOOD TYPE BARCODE: 6200
BH BB BLOOD TYPE BARCODE: 6200
BH BB DISPENSE STATUS: NORMAL
BH BB PRODUCT CODE: NORMAL
BH BB UNIT NUMBER: NORMAL
BILIRUB SERPL-MCNC: 0.6 MG/DL (ref 0–1.2)
BLASTS NFR BLD MANUAL: 11 % (ref 0–0)
BLASTS NFR BLD MANUAL: 16 % (ref 0–0)
BLD GP AB SCN SERPL QL: POSITIVE
BUN SERPL-MCNC: 8 MG/DL (ref 6–20)
BUN/CREAT SERPL: 18.6 (ref 7–25)
CALCIUM SPEC-SCNC: 8.3 MG/DL (ref 8.6–10.5)
CHLORIDE SERPL-SCNC: 100 MMOL/L (ref 98–107)
CO2 SERPL-SCNC: 27 MMOL/L (ref 22–29)
CREAT SERPL-MCNC: 0.43 MG/DL (ref 0.57–1)
CROSSMATCH INTERPRETATION: NORMAL
DEPRECATED RDW RBC AUTO: 64.8 FL (ref 37–54)
DEPRECATED RDW RBC AUTO: 65.2 FL (ref 37–54)
EOSINOPHIL # BLD MANUAL: 4.82 10*3/MM3 (ref 0–0.4)
EOSINOPHIL # BLD MANUAL: 7.13 10*3/MM3 (ref 0–0.4)
EOSINOPHIL NFR BLD MANUAL: 5 % (ref 0.3–6.2)
EOSINOPHIL NFR BLD MANUAL: 7 % (ref 0.3–6.2)
ERYTHROCYTE [DISTWIDTH] IN BLOOD BY AUTOMATED COUNT: 25.8 % (ref 12.3–15.4)
ERYTHROCYTE [DISTWIDTH] IN BLOOD BY AUTOMATED COUNT: 25.9 % (ref 12.3–15.4)
GFR SERPL CREATININE-BSD FRML MDRD: >150 ML/MIN/1.73
GLOBULIN UR ELPH-MCNC: 4.3 GM/DL
GLUCOSE BLDC GLUCOMTR-MCNC: 178 MG/DL (ref 70–105)
GLUCOSE BLDC GLUCOMTR-MCNC: 218 MG/DL (ref 70–105)
GLUCOSE BLDC GLUCOMTR-MCNC: 280 MG/DL (ref 70–105)
GLUCOSE BLDC GLUCOMTR-MCNC: 348 MG/DL (ref 70–105)
GLUCOSE SERPL-MCNC: 184 MG/DL (ref 65–99)
HCT VFR BLD AUTO: 22.5 % (ref 34–46.6)
HCT VFR BLD AUTO: 24.4 % (ref 34–46.6)
HGB BLD-MCNC: 7.1 G/DL (ref 12–15.9)
HGB BLD-MCNC: 7.6 G/DL (ref 12–15.9)
LARGE PLATELETS: ABNORMAL
LARGE PLATELETS: ABNORMAL
LDH SERPL-CCNC: 1554 U/L (ref 135–214)
LYMPHOCYTES # BLD MANUAL: 4.82 10*3/MM3 (ref 0.7–3.1)
LYMPHOCYTES # BLD MANUAL: 6.11 10*3/MM3 (ref 0.7–3.1)
LYMPHOCYTES NFR BLD MANUAL: 2 % (ref 5–12)
LYMPHOCYTES NFR BLD MANUAL: 3 % (ref 5–12)
MCH RBC QN AUTO: 23.3 PG (ref 26.6–33)
MCH RBC QN AUTO: 24 PG (ref 26.6–33)
MCHC RBC AUTO-ENTMCNC: 31.2 G/DL (ref 31.5–35.7)
MCHC RBC AUTO-ENTMCNC: 31.6 G/DL (ref 31.5–35.7)
MCV RBC AUTO: 74.6 FL (ref 79–97)
MCV RBC AUTO: 75.8 FL (ref 79–97)
METAMYELOCYTES NFR BLD MANUAL: 2 % (ref 0–0)
METAMYELOCYTES NFR BLD MANUAL: 5 % (ref 0–0)
MICROCYTES BLD QL: ABNORMAL
MICROCYTES BLD QL: ABNORMAL
MONOCYTES # BLD: 2.04 10*3/MM3 (ref 0.1–0.9)
MONOCYTES # BLD: 2.89 10*3/MM3 (ref 0.1–0.9)
MYELOCYTES NFR BLD MANUAL: 3 % (ref 0–0)
MYELOCYTES NFR BLD MANUAL: 5 % (ref 0–0)
NEUTROPHILS # BLD AUTO: 47.89 10*3/MM3 (ref 1.7–7)
NEUTROPHILS # BLD AUTO: 48.15 10*3/MM3 (ref 1.7–7)
NEUTROPHILS NFR BLD MANUAL: 27 % (ref 42.7–76)
NEUTROPHILS NFR BLD MANUAL: 40 % (ref 42.7–76)
NEUTS BAND NFR BLD MANUAL: 10 % (ref 0–5)
NEUTS BAND NFR BLD MANUAL: 20 % (ref 0–5)
PHOSPHATE SERPL-MCNC: 3.2 MG/DL (ref 2.5–4.5)
PLATELET # BLD AUTO: 486 10*3/MM3 (ref 140–450)
PLATELET # BLD AUTO: 543 10*3/MM3 (ref 140–450)
PMV BLD AUTO: 7.9 FL (ref 6–12)
PMV BLD AUTO: 7.9 FL (ref 6–12)
POIKILOCYTOSIS BLD QL SMEAR: ABNORMAL
POIKILOCYTOSIS BLD QL SMEAR: ABNORMAL
POLYCHROMASIA BLD QL SMEAR: ABNORMAL
POLYCHROMASIA BLD QL SMEAR: ABNORMAL
POTASSIUM SERPL-SCNC: 4.2 MMOL/L (ref 3.5–5.2)
PROMYELOCYTES NFR BLD MANUAL: 9 % (ref 0–0)
PROMYELOCYTES NFR BLD MANUAL: 9 % (ref 0–0)
PROT SERPL-MCNC: 7.1 G/DL (ref 6–8.5)
RBC # BLD AUTO: 2.97 10*6/MM3 (ref 3.77–5.28)
RBC # BLD AUTO: 3.27 10*6/MM3 (ref 3.77–5.28)
RH BLD: POSITIVE
SCAN SLIDE: NORMAL
SCAN SLIDE: NORMAL
SODIUM SERPL-SCNC: 136 MMOL/L (ref 136–145)
T&S EXPIRATION DATE: NORMAL
UNIT  ABO: NORMAL
UNIT  RH: NORMAL
URATE SERPL-MCNC: 5.6 MG/DL (ref 2.4–5.7)
VARIANT LYMPHS NFR BLD MANUAL: 5 % (ref 19.6–45.3)
VARIANT LYMPHS NFR BLD MANUAL: 6 % (ref 19.6–45.3)
WBC MORPH BLD: NORMAL
WBC MORPH BLD: NORMAL
WBC NRBC COR # BLD: 101.9 10*3/MM3 (ref 3.4–10.8)
WBC NRBC COR # BLD: 96.3 10*3/MM3 (ref 3.4–10.8)

## 2022-01-16 PROCEDURE — 80053 COMPREHEN METABOLIC PANEL: CPT | Performed by: INTERNAL MEDICINE

## 2022-01-16 PROCEDURE — 83615 LACTATE (LD) (LDH) ENZYME: CPT | Performed by: INTERNAL MEDICINE

## 2022-01-16 PROCEDURE — 25010000002 FUROSEMIDE PER 20 MG: Performed by: INTERNAL MEDICINE

## 2022-01-16 PROCEDURE — 85025 COMPLETE CBC W/AUTO DIFF WBC: CPT | Performed by: INTERNAL MEDICINE

## 2022-01-16 PROCEDURE — 86922 COMPATIBILITY TEST ANTIGLOB: CPT

## 2022-01-16 PROCEDURE — 99231 SBSQ HOSP IP/OBS SF/LOW 25: CPT | Performed by: INTERNAL MEDICINE

## 2022-01-16 PROCEDURE — 84550 ASSAY OF BLOOD/URIC ACID: CPT | Performed by: INTERNAL MEDICINE

## 2022-01-16 PROCEDURE — 86870 RBC ANTIBODY IDENTIFICATION: CPT | Performed by: INTERNAL MEDICINE

## 2022-01-16 PROCEDURE — 86901 BLOOD TYPING SEROLOGIC RH(D): CPT | Performed by: INTERNAL MEDICINE

## 2022-01-16 PROCEDURE — 63710000001 INSULIN ISOPHANE & REGULAR PER 5 UNITS: Performed by: INTERNAL MEDICINE

## 2022-01-16 PROCEDURE — 94799 UNLISTED PULMONARY SVC/PX: CPT

## 2022-01-16 PROCEDURE — 99233 SBSQ HOSP IP/OBS HIGH 50: CPT | Performed by: INTERNAL MEDICINE

## 2022-01-16 PROCEDURE — 85007 BL SMEAR W/DIFF WBC COUNT: CPT | Performed by: INTERNAL MEDICINE

## 2022-01-16 PROCEDURE — 86900 BLOOD TYPING SEROLOGIC ABO: CPT | Performed by: INTERNAL MEDICINE

## 2022-01-16 PROCEDURE — 63710000001 INSULIN LISPRO (HUMAN) PER 5 UNITS: Performed by: INTERNAL MEDICINE

## 2022-01-16 PROCEDURE — 84100 ASSAY OF PHOSPHORUS: CPT | Performed by: INTERNAL MEDICINE

## 2022-01-16 PROCEDURE — 86905 BLOOD TYPING RBC ANTIGENS: CPT

## 2022-01-16 PROCEDURE — 86850 RBC ANTIBODY SCREEN: CPT | Performed by: INTERNAL MEDICINE

## 2022-01-16 PROCEDURE — 25010000002 ENOXAPARIN PER 10 MG: Performed by: INTERNAL MEDICINE

## 2022-01-16 PROCEDURE — 82962 GLUCOSE BLOOD TEST: CPT

## 2022-01-16 RX ADMIN — INSULIN LISPRO 8 UNITS: 100 INJECTION, SOLUTION INTRAVENOUS; SUBCUTANEOUS at 21:08

## 2022-01-16 RX ADMIN — INSULIN LISPRO 8 UNITS: 100 INJECTION, SOLUTION INTRAVENOUS; SUBCUTANEOUS at 08:19

## 2022-01-16 RX ADMIN — BUDESONIDE AND FORMOTEROL FUMARATE DIHYDRATE 2 PUFF: 160; 4.5 AEROSOL RESPIRATORY (INHALATION) at 08:43

## 2022-01-16 RX ADMIN — HYDROXYUREA 500 MG: 500 CAPSULE ORAL at 21:05

## 2022-01-16 RX ADMIN — INSULIN HUMAN 30 UNITS: 100 INJECTION, SUSPENSION SUBCUTANEOUS at 08:19

## 2022-01-16 RX ADMIN — SODIUM CHLORIDE, PRESERVATIVE FREE 10 ML: 5 INJECTION INTRAVENOUS at 20:58

## 2022-01-16 RX ADMIN — Medication 5000 UNITS: at 21:00

## 2022-01-16 RX ADMIN — HYDROXYUREA 500 MG: 500 CAPSULE ORAL at 08:18

## 2022-01-16 RX ADMIN — ALBUTEROL SULFATE 2 PUFF: 108 INHALANT RESPIRATORY (INHALATION) at 19:53

## 2022-01-16 RX ADMIN — SODIUM CHLORIDE, PRESERVATIVE FREE 10 ML: 5 INJECTION INTRAVENOUS at 08:18

## 2022-01-16 RX ADMIN — DOCUSATE SODIUM AND SENNOSIDES 2 TABLET: 8.6; 5 TABLET, FILM COATED ORAL at 20:58

## 2022-01-16 RX ADMIN — AMITRIPTYLINE HYDROCHLORIDE 50 MG: 50 TABLET, FILM COATED ORAL at 20:59

## 2022-01-16 RX ADMIN — FUROSEMIDE 20 MG: 20 INJECTION, SOLUTION INTRAMUSCULAR; INTRAVENOUS at 06:17

## 2022-01-16 RX ADMIN — NILOTINIB 300 MG: 150 CAPSULE ORAL at 17:28

## 2022-01-16 RX ADMIN — INSULIN LISPRO 4 UNITS: 100 INJECTION, SOLUTION INTRAVENOUS; SUBCUTANEOUS at 12:51

## 2022-01-16 RX ADMIN — ALBUTEROL SULFATE 2 PUFF: 108 INHALANT RESPIRATORY (INHALATION) at 13:08

## 2022-01-16 RX ADMIN — ENOXAPARIN SODIUM 40 MG: 40 INJECTION SUBCUTANEOUS at 17:28

## 2022-01-16 RX ADMIN — OXYCODONE HYDROCHLORIDE AND ACETAMINOPHEN 1000 MG: 500 TABLET ORAL at 20:59

## 2022-01-16 RX ADMIN — ALBUTEROL SULFATE 2 PUFF: 108 INHALANT RESPIRATORY (INHALATION) at 08:39

## 2022-01-16 RX ADMIN — INSULIN HUMAN 35 UNITS: 100 INJECTION, SUSPENSION SUBCUTANEOUS at 17:28

## 2022-01-16 RX ADMIN — BUDESONIDE AND FORMOTEROL FUMARATE DIHYDRATE 2 PUFF: 160; 4.5 AEROSOL RESPIRATORY (INHALATION) at 19:55

## 2022-01-16 RX ADMIN — FUROSEMIDE 20 MG: 20 INJECTION, SOLUTION INTRAMUSCULAR; INTRAVENOUS at 17:28

## 2022-01-16 RX ADMIN — INSULIN LISPRO 16 UNITS: 100 INJECTION, SOLUTION INTRAVENOUS; SUBCUTANEOUS at 17:27

## 2022-01-16 RX ADMIN — DOCUSATE SODIUM AND SENNOSIDES 2 TABLET: 8.6; 5 TABLET, FILM COATED ORAL at 08:18

## 2022-01-16 RX ADMIN — ALLOPURINOL 300 MG: 300 TABLET ORAL at 08:18

## 2022-01-17 ENCOUNTER — READMISSION MANAGEMENT (OUTPATIENT)
Dept: CALL CENTER | Facility: HOSPITAL | Age: 47
End: 2022-01-17

## 2022-01-17 VITALS
HEIGHT: 64 IN | DIASTOLIC BLOOD PRESSURE: 74 MMHG | WEIGHT: 143.52 LBS | RESPIRATION RATE: 18 BRPM | TEMPERATURE: 98.5 F | HEART RATE: 121 BPM | OXYGEN SATURATION: 98 % | SYSTOLIC BLOOD PRESSURE: 134 MMHG | BODY MASS INDEX: 24.5 KG/M2

## 2022-01-17 LAB
ALBUMIN SERPL-MCNC: 3 G/DL (ref 3.5–5.2)
ALBUMIN/GLOB SERPL: 0.6 G/DL
ALP SERPL-CCNC: 615 U/L (ref 39–117)
ALT SERPL W P-5'-P-CCNC: 8 U/L (ref 1–33)
ANA SER QL: NEGATIVE
ANION GAP SERPL CALCULATED.3IONS-SCNC: 12 MMOL/L (ref 5–15)
ANISOCYTOSIS BLD QL: ABNORMAL
AST SERPL-CCNC: 17 U/L (ref 1–32)
BACTERIA SPEC AEROBE CULT: NORMAL
BACTERIA SPEC AEROBE CULT: NORMAL
BASOPHILS # BLD MANUAL: 14.82 10*3/MM3 (ref 0–0.2)
BASOPHILS NFR BLD MANUAL: 11 % (ref 0–1.5)
BILIRUB SERPL-MCNC: 0.9 MG/DL (ref 0–1.2)
BLASTS NFR BLD MANUAL: 11 % (ref 0–0)
BUN SERPL-MCNC: 7 MG/DL (ref 6–20)
BUN/CREAT SERPL: 14 (ref 7–25)
CALCIUM SPEC-SCNC: 9 MG/DL (ref 8.6–10.5)
CHLORIDE SERPL-SCNC: 95 MMOL/L (ref 98–107)
CO2 SERPL-SCNC: 26 MMOL/L (ref 22–29)
CREAT SERPL-MCNC: 0.5 MG/DL (ref 0.57–1)
DEPRECATED RDW RBC AUTO: 64.8 FL (ref 37–54)
EOSINOPHIL # BLD MANUAL: 8.08 10*3/MM3 (ref 0–0.4)
EOSINOPHIL NFR BLD MANUAL: 6 % (ref 0.3–6.2)
ERYTHROCYTE [DISTWIDTH] IN BLOOD BY AUTOMATED COUNT: 25.7 % (ref 12.3–15.4)
GFR SERPL CREATININE-BSD FRML MDRD: 133 ML/MIN/1.73
GLOBULIN UR ELPH-MCNC: 5.1 GM/DL
GLUCOSE BLDC GLUCOMTR-MCNC: 211 MG/DL (ref 70–105)
GLUCOSE BLDC GLUCOMTR-MCNC: 231 MG/DL (ref 70–105)
GLUCOSE BLDC GLUCOMTR-MCNC: 329 MG/DL (ref 70–105)
GLUCOSE SERPL-MCNC: 304 MG/DL (ref 65–99)
HCT VFR BLD AUTO: 25.2 % (ref 34–46.6)
HGB BLD-MCNC: 7.9 G/DL (ref 12–15.9)
LAB AP CASE REPORT: NORMAL
LDH SERPL-CCNC: 2054 U/L (ref 135–214)
LYMPHOCYTES # BLD MANUAL: 22.9 10*3/MM3 (ref 0.7–3.1)
LYMPHOCYTES NFR BLD MANUAL: 4 % (ref 5–12)
MCH RBC QN AUTO: 24.3 PG (ref 26.6–33)
MCHC RBC AUTO-ENTMCNC: 31.5 G/DL (ref 31.5–35.7)
MCV RBC AUTO: 77.3 FL (ref 79–97)
METAMYELOCYTES NFR BLD MANUAL: 4 % (ref 0–0)
MONOCYTES # BLD: 5.39 10*3/MM3 (ref 0.1–0.9)
MYELOCYTES NFR BLD MANUAL: 5 % (ref 0–0)
NEUTROPHILS # BLD AUTO: 49.84 10*3/MM3 (ref 1.7–7)
NEUTROPHILS NFR BLD MANUAL: 24 % (ref 42.7–76)
NEUTS BAND NFR BLD MANUAL: 13 % (ref 0–5)
NRBC SPEC MANUAL: 5 /100 WBC (ref 0–0.2)
PATH REPORT.FINAL DX SPEC: NORMAL
PHOSPHATE SERPL-MCNC: 5.5 MG/DL (ref 2.5–4.5)
PLAT MORPH BLD: NORMAL
PLATELET # BLD AUTO: 544 10*3/MM3 (ref 140–450)
PMV BLD AUTO: 8 FL (ref 6–12)
POLYCHROMASIA BLD QL SMEAR: ABNORMAL
POTASSIUM SERPL-SCNC: 4.4 MMOL/L (ref 3.5–5.2)
PROLYMPHOCYTES NFR BLD MANUAL: 1 % (ref 0–0)
PROMYELOCYTES NFR BLD MANUAL: 4 % (ref 0–0)
PROT SERPL-MCNC: 8.1 G/DL (ref 6–8.5)
RBC # BLD AUTO: 3.26 10*6/MM3 (ref 3.77–5.28)
REF LAB TEST METHOD: NORMAL
REF LAB TEST METHOD: NORMAL
SCAN SLIDE: NORMAL
SODIUM SERPL-SCNC: 133 MMOL/L (ref 136–145)
URATE SERPL-MCNC: 5.5 MG/DL (ref 2.4–5.7)
VARIANT LYMPHS NFR BLD MANUAL: 17 % (ref 19.6–45.3)
WBC MORPH BLD: NORMAL
WBC NRBC COR # BLD: 134.7 10*3/MM3 (ref 3.4–10.8)

## 2022-01-17 PROCEDURE — 82962 GLUCOSE BLOOD TEST: CPT

## 2022-01-17 PROCEDURE — 99233 SBSQ HOSP IP/OBS HIGH 50: CPT | Performed by: INTERNAL MEDICINE

## 2022-01-17 PROCEDURE — 36430 TRANSFUSION BLD/BLD COMPNT: CPT

## 2022-01-17 PROCEDURE — P9016 RBC LEUKOCYTES REDUCED: HCPCS

## 2022-01-17 PROCEDURE — 94799 UNLISTED PULMONARY SVC/PX: CPT

## 2022-01-17 PROCEDURE — 63710000001 INSULIN ISOPHANE & REGULAR PER 5 UNITS: Performed by: INTERNAL MEDICINE

## 2022-01-17 PROCEDURE — 85025 COMPLETE CBC W/AUTO DIFF WBC: CPT | Performed by: INTERNAL MEDICINE

## 2022-01-17 PROCEDURE — 63710000001 INSULIN LISPRO (HUMAN) PER 5 UNITS: Performed by: INTERNAL MEDICINE

## 2022-01-17 PROCEDURE — 99231 SBSQ HOSP IP/OBS SF/LOW 25: CPT | Performed by: INTERNAL MEDICINE

## 2022-01-17 PROCEDURE — 86900 BLOOD TYPING SEROLOGIC ABO: CPT

## 2022-01-17 PROCEDURE — 84100 ASSAY OF PHOSPHORUS: CPT | Performed by: INTERNAL MEDICINE

## 2022-01-17 PROCEDURE — 25010000002 FUROSEMIDE PER 20 MG: Performed by: INTERNAL MEDICINE

## 2022-01-17 PROCEDURE — 85007 BL SMEAR W/DIFF WBC COUNT: CPT | Performed by: INTERNAL MEDICINE

## 2022-01-17 PROCEDURE — 84550 ASSAY OF BLOOD/URIC ACID: CPT | Performed by: INTERNAL MEDICINE

## 2022-01-17 PROCEDURE — 80053 COMPREHEN METABOLIC PANEL: CPT | Performed by: INTERNAL MEDICINE

## 2022-01-17 PROCEDURE — 83615 LACTATE (LD) (LDH) ENZYME: CPT | Performed by: INTERNAL MEDICINE

## 2022-01-17 PROCEDURE — 99239 HOSP IP/OBS DSCHRG MGMT >30: CPT | Performed by: INTERNAL MEDICINE

## 2022-01-17 RX ORDER — INSULIN LISPRO 100 [IU]/ML
0-12 INJECTION, SOLUTION INTRAVENOUS; SUBCUTANEOUS
Status: DISCONTINUED | OUTPATIENT
Start: 2022-01-17 | End: 2022-01-17 | Stop reason: HOSPADM

## 2022-01-17 RX ORDER — HYDROXYUREA 500 MG/1
500 CAPSULE ORAL 2 TIMES DAILY
Qty: 60 CAPSULE | Refills: 0 | Status: SHIPPED | OUTPATIENT
Start: 2022-01-17 | End: 2022-02-16

## 2022-01-17 RX ORDER — ALLOPURINOL 300 MG/1
300 TABLET ORAL DAILY
Qty: 30 TABLET | Refills: 0 | Status: SHIPPED | OUTPATIENT
Start: 2022-01-18 | End: 2022-02-17

## 2022-01-17 RX ADMIN — INSULIN HUMAN 35 UNITS: 100 INJECTION, SUSPENSION SUBCUTANEOUS at 09:18

## 2022-01-17 RX ADMIN — HYDROXYUREA 500 MG: 500 CAPSULE ORAL at 09:18

## 2022-01-17 RX ADMIN — SODIUM CHLORIDE, PRESERVATIVE FREE 10 ML: 5 INJECTION INTRAVENOUS at 09:18

## 2022-01-17 RX ADMIN — INSULIN LISPRO 8 UNITS: 100 INJECTION, SOLUTION INTRAVENOUS; SUBCUTANEOUS at 09:18

## 2022-01-17 RX ADMIN — FUROSEMIDE 20 MG: 20 INJECTION, SOLUTION INTRAMUSCULAR; INTRAVENOUS at 05:20

## 2022-01-17 RX ADMIN — INSULIN LISPRO 8 UNITS: 100 INJECTION, SOLUTION INTRAVENOUS; SUBCUTANEOUS at 12:15

## 2022-01-17 RX ADMIN — ALBUTEROL SULFATE 2 PUFF: 108 INHALANT RESPIRATORY (INHALATION) at 10:35

## 2022-01-17 RX ADMIN — DOCUSATE SODIUM AND SENNOSIDES 2 TABLET: 8.6; 5 TABLET, FILM COATED ORAL at 09:18

## 2022-01-17 RX ADMIN — ALLOPURINOL 300 MG: 300 TABLET ORAL at 09:19

## 2022-01-17 RX ADMIN — NILOTINIB 300 MG: 150 CAPSULE ORAL at 09:20

## 2022-01-17 RX ADMIN — ALBUTEROL SULFATE 2 PUFF: 108 INHALANT RESPIRATORY (INHALATION) at 14:37

## 2022-01-18 LAB
1,25(OH)2D SERPL-MCNC: 11.7 PG/ML (ref 19.9–79.3)
BH BB BLOOD EXPIRATION DATE: NORMAL
BH BB BLOOD TYPE BARCODE: 600
BH BB BLOOD TYPE BARCODE: 600
BH BB BLOOD TYPE BARCODE: 6200
BH BB DISPENSE STATUS: NORMAL
BH BB PRODUCT CODE: NORMAL
BH BB UNIT NUMBER: NORMAL
CROSSMATCH INTERPRETATION: NORMAL
REF LAB TEST METHOD: NORMAL
UNIT  ABO: NORMAL
UNIT  RH: NORMAL

## 2022-01-18 NOTE — OUTREACH NOTE
Prep Survey      Responses   [facility]   Is LACE score < 7 ? No   Emergency Room discharge w/ pulse ox? No   Eligibility Readm Mgmt   Discharge diagnosis dyspnea d/t severe symptomatic anemia & recent COVID-19 PNA, CML, T2DM   Does the patient have one of the following disease processes/diagnoses(primary or secondary)? Other   Does the patient have Home health ordered? No   Is there a DME ordered? No   Prep survey completed? Yes          Kim Ragsdale RN

## 2022-01-19 ENCOUNTER — LAB (OUTPATIENT)
Dept: LAB | Facility: HOSPITAL | Age: 47
End: 2022-01-19

## 2022-01-19 ENCOUNTER — TELEPHONE (OUTPATIENT)
Dept: ONCOLOGY | Facility: HOSPITAL | Age: 47
End: 2022-01-19

## 2022-01-19 ENCOUNTER — SPECIALTY PHARMACY (OUTPATIENT)
Dept: PHARMACY | Facility: HOSPITAL | Age: 47
End: 2022-01-19

## 2022-01-19 ENCOUNTER — READMISSION MANAGEMENT (OUTPATIENT)
Dept: CALL CENTER | Facility: HOSPITAL | Age: 47
End: 2022-01-19

## 2022-01-19 DIAGNOSIS — C91.10 CLL (CHRONIC LYMPHOCYTIC LEUKEMIA): ICD-10-CM

## 2022-01-19 DIAGNOSIS — C95.90 LEUKEMIA NOT HAVING ACHIEVED REMISSION, UNSPECIFIED LEUKEMIA TYPE: Primary | ICD-10-CM

## 2022-01-19 DIAGNOSIS — C95.90 LEUKEMIA NOT HAVING ACHIEVED REMISSION, UNSPECIFIED LEUKEMIA TYPE: ICD-10-CM

## 2022-01-19 DIAGNOSIS — C92.10 CML (CHRONIC MYELOCYTIC LEUKEMIA): ICD-10-CM

## 2022-01-19 LAB
BASOPHILS NFR BLD AUTO: ABNORMAL %
DEPRECATED RDW RBC AUTO: 65 FL (ref 37–54)
EOSINOPHIL # BLD AUTO: 5.56 10*3/MM3 (ref 0–0.4)
EOSINOPHIL NFR BLD AUTO: 4.3 % (ref 0.3–6.2)
ERYTHROCYTE [DISTWIDTH] IN BLOOD BY AUTOMATED COUNT: 25 % (ref 12.3–15.4)
HCT VFR BLD AUTO: 26.9 % (ref 34–46.6)
HGB BLD-MCNC: 8.4 G/DL (ref 12–15.9)
LYMPHOCYTES NFR BLD AUTO: ABNORMAL %
MCH RBC QN AUTO: 25.8 PG (ref 26.6–33)
MCHC RBC AUTO-ENTMCNC: 31.2 G/DL (ref 31.5–35.7)
MCV RBC AUTO: 82.8 FL (ref 79–97)
MONOCYTES # BLD AUTO: 16.47 10*3/MM3 (ref 0.1–0.9)
MONOCYTES NFR BLD AUTO: 12.8 % (ref 5–12)
NEUTROPHILS NFR BLD AUTO: ABNORMAL %
PLATELET # BLD AUTO: 518 10*3/MM3 (ref 140–450)
PMV BLD AUTO: 9.7 FL (ref 6–12)
RBC # BLD AUTO: 3.25 10*6/MM3 (ref 3.77–5.28)
WBC NRBC COR # BLD: 128.93 10*3/MM3 (ref 3.4–10.8)

## 2022-01-19 PROCEDURE — 85025 COMPLETE CBC W/AUTO DIFF WBC: CPT

## 2022-01-19 PROCEDURE — 36415 COLL VENOUS BLD VENIPUNCTURE: CPT

## 2022-01-19 NOTE — TELEPHONE ENCOUNTER
Pt to the clinic for CBC only. Results reviewed with Dr. Lerma. Orders to increase Hydrea to 500mg 2 tablets in the am and 2 tablet in the pm given. Pt notified and v/u.  Pt is talking with Ros regarding her oral chemo.

## 2022-01-19 NOTE — OUTREACH NOTE
Medical Week 1 Survey      Responses   [facility] patient discharged from? [facility]   Does the patient have one of the following disease processes/diagnoses(primary or secondary)? Other   Week 1 attempt successful? No   Unsuccessful attempts Attempt 1          Sarah Schmitt RN

## 2022-01-20 LAB — REF LAB TEST METHOD: NORMAL

## 2022-01-21 ENCOUNTER — APPOINTMENT (OUTPATIENT)
Dept: LAB | Facility: HOSPITAL | Age: 47
End: 2022-01-21

## 2022-01-21 ENCOUNTER — TELEPHONE (OUTPATIENT)
Dept: ONCOLOGY | Facility: CLINIC | Age: 47
End: 2022-01-21

## 2022-01-21 ENCOUNTER — TELEPHONE (OUTPATIENT)
Dept: ONCOLOGY | Facility: HOSPITAL | Age: 47
End: 2022-01-21

## 2022-01-21 ENCOUNTER — APPOINTMENT (OUTPATIENT)
Dept: PHARMACY | Facility: HOSPITAL | Age: 47
End: 2022-01-21

## 2022-01-21 LAB
LAB AP CASE REPORT: NORMAL
LAB AP CLINICAL INFORMATION: NORMAL
LAB AP DIAGNOSIS COMMENT: NORMAL
LAB AP FLOW CYTOMETRY SUMMARY: NORMAL
LAB AP INTEGRATED ONCOLOGY, ADDENDUM: NORMAL
PATH REPORT.FINAL DX SPEC: NORMAL
PATH REPORT.GROSS SPEC: NORMAL

## 2022-01-21 NOTE — TELEPHONE ENCOUNTER
Provider: DR SCOTT    Caller: SAMUEL    Relationship to Patient: MOTHER    Reason for Call: BLESSING IS CALLING BECAUSE HOPE HAS MOVED IN WITH HER AFTER SHE GOT OUT OF THE HOSPITAL ON MONDAY.    SINCE SHE HAS BEEN THERE, HOPE IS HAVING ANXIETY ATTACKS AND IS NOT SLEEPING. SHE SAYS SHE CANT BREATH.  SHE ALSO STATES THAT HOPE'S ANKLES ARE SWELLING.   SAMUEL IS THINKING IT MAY BE THE CHEMO.       PLEASE CALL HER TO DISCUSS AND ADVISE

## 2022-01-21 NOTE — TELEPHONE ENCOUNTER
Pt's mother called and reported that pt has been c/o anxiousness and SOA since starting the Tasigna she reported to me that she is taking it TID she thinks, and she was requesting something for her anxiety. Kalen from our office  called pt back and did not get an answer to have a NP visit today.

## 2022-01-24 ENCOUNTER — LAB (OUTPATIENT)
Dept: LAB | Facility: HOSPITAL | Age: 47
End: 2022-01-24

## 2022-01-24 ENCOUNTER — OFFICE VISIT (OUTPATIENT)
Dept: ONCOLOGY | Facility: CLINIC | Age: 47
End: 2022-01-24

## 2022-01-24 ENCOUNTER — DOCUMENTATION (OUTPATIENT)
Dept: ONCOLOGY | Facility: HOSPITAL | Age: 47
End: 2022-01-24

## 2022-01-24 ENCOUNTER — READMISSION MANAGEMENT (OUTPATIENT)
Dept: CALL CENTER | Facility: HOSPITAL | Age: 47
End: 2022-01-24

## 2022-01-24 ENCOUNTER — SPECIALTY PHARMACY (OUTPATIENT)
Dept: PHARMACY | Facility: HOSPITAL | Age: 47
End: 2022-01-24

## 2022-01-24 VITALS
DIASTOLIC BLOOD PRESSURE: 94 MMHG | HEIGHT: 64 IN | WEIGHT: 133 LBS | HEART RATE: 114 BPM | RESPIRATION RATE: 18 BRPM | BODY MASS INDEX: 22.71 KG/M2 | SYSTOLIC BLOOD PRESSURE: 155 MMHG

## 2022-01-24 DIAGNOSIS — C95.90 LEUKEMIA NOT HAVING ACHIEVED REMISSION, UNSPECIFIED LEUKEMIA TYPE: Primary | ICD-10-CM

## 2022-01-24 DIAGNOSIS — C92.10 CML (CHRONIC MYELOCYTIC LEUKEMIA): ICD-10-CM

## 2022-01-24 DIAGNOSIS — C95.90 LEUKEMIA NOT HAVING ACHIEVED REMISSION, UNSPECIFIED LEUKEMIA TYPE: ICD-10-CM

## 2022-01-24 DIAGNOSIS — C91.10 CLL (CHRONIC LYMPHOCYTIC LEUKEMIA): ICD-10-CM

## 2022-01-24 LAB
ALBUMIN SERPL-MCNC: 3.4 G/DL (ref 3.5–5.2)
ALBUMIN/GLOB SERPL: 0.8 G/DL
ALP SERPL-CCNC: 650 U/L (ref 39–117)
ALT SERPL W P-5'-P-CCNC: 12 U/L (ref 1–33)
ANION GAP SERPL CALCULATED.3IONS-SCNC: 12 MMOL/L (ref 5–15)
AST SERPL-CCNC: 13 U/L (ref 1–32)
BASOPHILS NFR BLD AUTO: ABNORMAL %
BILIRUB SERPL-MCNC: 1 MG/DL (ref 0–1.2)
BUN SERPL-MCNC: 10 MG/DL (ref 6–20)
BUN/CREAT SERPL: 21.7 (ref 7–25)
CALCIUM SPEC-SCNC: 8.9 MG/DL (ref 8.6–10.5)
CHLORIDE SERPL-SCNC: 94 MMOL/L (ref 98–107)
CO2 SERPL-SCNC: 23 MMOL/L (ref 22–29)
CREAT SERPL-MCNC: 0.46 MG/DL (ref 0.57–1)
DEPRECATED RDW RBC AUTO: 73.1 FL (ref 37–54)
EOSINOPHIL NFR BLD AUTO: ABNORMAL %
ERYTHROCYTE [DISTWIDTH] IN BLOOD BY AUTOMATED COUNT: 26.4 % (ref 12.3–15.4)
GFR SERPL CREATININE-BSD FRML MDRD: 146 ML/MIN/1.73
GLOBULIN UR ELPH-MCNC: 4.5 GM/DL
GLUCOSE SERPL-MCNC: 485 MG/DL (ref 65–99)
HCT VFR BLD AUTO: 28.7 % (ref 34–46.6)
HGB BLD-MCNC: 9.1 G/DL (ref 12–15.9)
HOLD SPECIMEN: NORMAL
KARYOTYP MAR: NORMAL
LDH SERPL-CCNC: 1115 U/L (ref 135–214)
LYMPHOCYTES NFR BLD AUTO: ABNORMAL %
MCH RBC QN AUTO: 25.7 PG (ref 26.6–33)
MCHC RBC AUTO-ENTMCNC: 31.7 G/DL (ref 31.5–35.7)
MCV RBC AUTO: 81.1 FL (ref 79–97)
MONOCYTES NFR BLD AUTO: ABNORMAL %
NEUTROPHILS NFR BLD AUTO: ABNORMAL %
PHOSPHATE SERPL-MCNC: 3.9 MG/DL (ref 2.5–4.5)
PLATELET # BLD AUTO: 415 10*3/MM3 (ref 140–450)
PMV BLD AUTO: 9.8 FL (ref 6–12)
POTASSIUM SERPL-SCNC: 4.2 MMOL/L (ref 3.5–5.2)
PROT SERPL-MCNC: 7.9 G/DL (ref 6–8.5)
RBC # BLD AUTO: 3.54 10*6/MM3 (ref 3.77–5.28)
SODIUM SERPL-SCNC: 129 MMOL/L (ref 136–145)
URATE SERPL-MCNC: 6.2 MG/DL (ref 2.4–5.7)
WBC NRBC COR # BLD: 131.67 10*3/MM3 (ref 3.4–10.8)

## 2022-01-24 PROCEDURE — 99215 OFFICE O/P EST HI 40 MIN: CPT | Performed by: INTERNAL MEDICINE

## 2022-01-24 PROCEDURE — 84100 ASSAY OF PHOSPHORUS: CPT | Performed by: INTERNAL MEDICINE

## 2022-01-24 PROCEDURE — 83615 LACTATE (LD) (LDH) ENZYME: CPT | Performed by: INTERNAL MEDICINE

## 2022-01-24 PROCEDURE — 36415 COLL VENOUS BLD VENIPUNCTURE: CPT

## 2022-01-24 PROCEDURE — 85025 COMPLETE CBC W/AUTO DIFF WBC: CPT

## 2022-01-24 PROCEDURE — 84550 ASSAY OF BLOOD/URIC ACID: CPT | Performed by: INTERNAL MEDICINE

## 2022-01-24 PROCEDURE — 80053 COMPREHEN METABOLIC PANEL: CPT | Performed by: INTERNAL MEDICINE

## 2022-01-24 RX ORDER — ALPRAZOLAM 0.25 MG/1
0.25 TABLET ORAL NIGHTLY PRN
Qty: 30 TABLET | Refills: 0 | Status: SHIPPED | OUTPATIENT
Start: 2022-01-24 | End: 2022-03-29 | Stop reason: SDUPTHER

## 2022-01-24 RX ORDER — CITALOPRAM 10 MG/1
10 TABLET ORAL DAILY
Qty: 30 TABLET | Refills: 1 | Status: SHIPPED | OUTPATIENT
Start: 2022-01-24 | End: 2022-03-03

## 2022-01-24 NOTE — PROGRESS NOTES
Hematology/Oncology Outpatient Follow Up    PATIENT NAME:Nieves Mc  :1975  MRN: [id number]  PRIMARY CARE PHYSICIAN: Provider, No Known  REFERRING PHYSICIAN: No ref. provider found    Chief Complaint   Patient presents with   • Follow-up     CML        HISTORY OF PRESENT ILLNESS:      Patient is a 43-year-old female with PMH significant for CML on   treatment with Imatinib.  Patient stated that she typically feels poorly with Imatinib with   frequent nausea and vomiting.  Also complained of weakness and fatigue.  Patient presented to ED   on 10/22/18 with complaints of an elevated blood sugar around 400.  Stated she felt poorly and   has had a productive cough with yellow sputum.  Complained of nausea and stated she was unable to   keep any food down anytime she ate.  The patient reported subjective fever without chills.  She   stated she had been coughing so hard that she was vomiting.  She denied hematemesis.  Denied   diarrhea, constipation or blood in her stool.       Patient’s chest x-ray showed no evidence of acute pulmonary embolus.  The patient has ground-glass   opacities throughout the lungs.  There is some air trapping noted which would appear to be   infectious.  Patient was started on antibiotics.      Hem/Onc was consulted on 10/23/18 for co-management of patient with CML.  Patient was seen by Dr. Lerma on 10/12 on previous admission for patient reported CML on Imatinib (Gleevec).  Patient   reports she is under the care of Dr. Roach at [facility] in [place].  Patient was   seen by Dr. Lerma in May 2018 when patient presented with hematuria, which was attributed to her   Imatinib.  Dr. Lerma followed during hospitalization but then patient returned to Dr. Roach   for her usual follow up.  She was again seen on 10/12.  Patient is stating she will switch to Dr. Lerma.    · Review of Dr. Roach’s note:  On 18 patient had BCR-abl which was 61.326.  Patient had    been on Imatinib 400 mg daily.  She had presented in March 2018 with WBC of 24, hemoglobin 13.1,   platelet count of 1,067,000.  Patient apparently had follow up BCR-abl in August 2018, results   are not available for review.  Her bone marrow aspiration and biopsy was also performed at that   time is currently not available for review.    · 11/6/18 - .  Uric acid 6.5.  BCR-abl by RT-PCR was measured at 3.36%.    · Due to thrombocytosis, patient was placed on Hydroxyurea 500 mg twice a day on 12/11/18.    Platelet count juana to 900,000.  Patient was noncompliant with CBCs that were recommended.    Patient was asked to return to the office after not being able to reach her.   · 12/27/18 - Bone marrow aspiration and biopsy was consistent with chronic myeloid leukemia.    BCR-abl positive, chronic phase.  ABL kinase mutational analysis is currently pending on the bone   marrow.    · 1/3/19 - Repeat CBC, WBC 17, hemoglobin 11.9, platelet count 1,072,000.  Hydroxyurea was   increased to 1 gm twice a day with follow up CBC.    · 1/6/19 - Follow up CBC showed progressive increase in platelet to 1.1 million.  Patient was   offered admission to the hospital, which she declined.  Hydroxyurea was increased to 1 gm three   times a day as of 1/6/19.   · 1/7/19 - EKG shows sinus tachycardia.   milliseconds.   · ABL kinase on peripheral blood was ordered on 1/11/19.  Based on sequence analysis, there was no   mutation detected.    · 2/12/19 - Patient was initiated on Tasigna 300 mg twice a day.  · 2/13/19 - Urinalysis was negative for hematuria.   · 2/22/19 -  milliseconds.  · 3/13/19 - EKG with QTC is 413 milliseconds.  Nonspecific changes noted.    · 4/18/19 - EKG showed QTC prolonged to 453 milliseconds.  This is changed from prior.   · 4/18/19 - Free T4 normal at 0.91.  Magnesium was 1.7.  BUN is 6.  Creatinine 0.7.  Bilirubin   2.2.  Phosphorous normal 3.7.  TSH 0.43, normal.  Free T3 was normal at 3.47.   Ionized calcium   normal at 1.23.  BCR-abl was 0.522%.    · 19 - EKG showed QTC of 441 milliseconds.  QT was 366.     · Patient has been lost to follow-up since 2019 for CML.  Patient states that she lost her insurance.  She also does not have any primary care physician at this time.  She is diabetic on insulin.  · Patient was slightly admitted to the hospital for pneumonia due to COVID-19 infection.  At that time patient made a decision to become compliant with treatment.  She is currently on to Cigna 300 mg p.o. twice a day.  She is also on hydroxyurea 1 g twice a day.  Overall she feels better, she has more energy.  Past Medical History:   Diagnosis Date   • Bone pain    • Extremity pain     Carlin. legs pain   • Leg pain     left leg greater   • Migraine    • Pulmonary embolism (HCC)    • Vision loss     doing surgery       Past Surgical History:   Procedure Laterality Date   • BONE MARROW BIOPSY     • BREAST SURGERY     •  SECTION     • CHOLECYSTECTOMY     • EYE SURGERY      laser surgery due  to hemmorage--- 2021-- another surgery  lt eye11/15/21   • SPINE SURGERY      Lombardi spinal block   • TUBAL ABDOMINAL LIGATION           Current Outpatient Medications:   •  allopurinol (ZYLOPRIM) 300 MG tablet, Take 1 tablet by mouth Daily for 30 days., Disp: 30 tablet, Rfl: 0  •  ALPRAZolam (Xanax) 0.25 MG tablet, Take 1 tablet by mouth At Night As Needed for Anxiety., Disp: 30 tablet, Rfl: 0  •  citalopram (CeleXA) 10 MG tablet, Take 1 tablet by mouth Daily. To begin 22, Disp: 30 tablet, Rfl: 1  •  hydroxyurea (HYDREA) 500 MG capsule, Take 1 capsule by mouth 2 (Two) Times a Day for 30 days. (Patient taking differently: Take 500 mg by mouth 2 (Two) Times a Day. Take 1000mg PO BID), Disp: 60 capsule, Rfl: 0  •  insulin NPH-insulin regular (humuLIN 70/30,novoLIN 70/30) (70-30) 100 UNIT/ML injection, Inject 40 Units under the skin into the appropriate area as directed 2 (Two) Times a Day With Meals.,  Disp: 30 mL, Rfl: 12  •  Nilotinib HCl (TASIGNA) 150 MG capsule capsule, Take 2 capsules by mouth 2 (Two) Times a Day for 28 days. Take on EMPTY stomach, 1 hour before or 2 hours after a meal., Disp: 112 capsule, Rfl: 0  •  oxyCODONE-acetaminophen (PERCOCET)  MG per tablet, Take 1 tablet by mouth 2 (Two) Times a Day As Needed for Moderate Pain ., Disp: 60 tablet, Rfl: 0  •  rivaroxaban (XARELTO) 20 MG tablet, Take 1 tablet by mouth Daily., Disp: 30 tablet, Rfl: 6    Allergies   Allergen Reactions   • Hydromorphone GI Intolerance     dilaudid   • Morphine Nausea And Vomiting   • Propofol Hives       Family History   Problem Relation Age of Onset   • Diabetes Mother    • Diabetes Maternal Grandmother    • Heart attack Maternal Grandmother    • Stroke Maternal Grandmother        Cancer-related family history is not on file.    Social History     Tobacco Use   • Smoking status: Never Smoker   • Smokeless tobacco: Never Used   Vaping Use   • Vaping Use: Never used   Substance Use Topics   • Alcohol use: No   • Drug use: No       HPI, ROS and PFSH have been reviewed and confirmed on 1/24/2022.     SUBJECTIVE:    Complain of a lot of anxiety and insomnia.  There are no suicidal or homicidal ideations        REVIEW OF SYSTEMS:  Review of Systems   Constitutional: Negative for chills and fever.   HENT: Negative for ear pain, mouth sores, nosebleeds and sore throat.    Eyes: Negative for photophobia and visual disturbance.   Respiratory: Negative for wheezing and stridor.    Cardiovascular: Negative for chest pain and palpitations.   Gastrointestinal: Negative for abdominal pain, diarrhea, nausea and vomiting.   Endocrine: Negative for cold intolerance and heat intolerance.   Genitourinary: Negative for dysuria and hematuria.   Musculoskeletal: Negative for joint swelling and neck stiffness.   Skin: Negative for color change and rash.   Neurological: Negative for seizures and syncope.   Hematological: Negative for  "adenopathy.        No obvious bleeding   Psychiatric/Behavioral: Negative for agitation, confusion and hallucinations.       OBJECTIVE:    Vitals:    01/24/22 1117   BP: 155/94   Pulse: 114   Resp: 18   Weight: 60.3 kg (133 lb)   Height: 162.6 cm (64\")   PainSc: 0-No pain     Body mass index is 22.83 kg/m².    ECOG  (1) Restricted in physically strenuous activity, ambulatory and able to do work of light nature    Physical Exam  Vitals and nursing note reviewed.   Constitutional:       General: She is not in acute distress.     Appearance: She is not diaphoretic.   HENT:      Head: Normocephalic and atraumatic.   Eyes:      General: No scleral icterus.        Right eye: No discharge.         Left eye: No discharge.      Conjunctiva/sclera: Conjunctivae normal.   Neck:      Thyroid: No thyromegaly.   Cardiovascular:      Rate and Rhythm: Normal rate and regular rhythm.      Heart sounds: Normal heart sounds. No friction rub. No gallop.    Pulmonary:      Effort: Pulmonary effort is normal. No respiratory distress.      Breath sounds: No stridor. No wheezing.   Abdominal:      General: Bowel sounds are normal.      Palpations: Abdomen is soft. There is no mass.      Tenderness: There is no abdominal tenderness. There is no guarding or rebound.   Musculoskeletal:         General: No tenderness. Normal range of motion.      Cervical back: Normal range of motion and neck supple.   Lymphadenopathy:      Cervical: No cervical adenopathy.   Skin:     General: Skin is warm.      Findings: No erythema or rash.   Neurological:      Mental Status: She is alert and oriented to person, place, and time.      Motor: No abnormal muscle tone.   Psychiatric:         Behavior: Behavior normal.         RECENT LABS  WBC   Date Value Ref Range Status   01/24/2022 131.67 (C) 3.40 - 10.80 10*3/mm3 Final     RBC   Date Value Ref Range Status   01/24/2022 3.54 (L) 3.77 - 5.28 10*6/mm3 Final     Hemoglobin   Date Value Ref Range Status "   01/24/2022 9.1 (L) 12.0 - 15.9 g/dL Final     Hematocrit   Date Value Ref Range Status   01/24/2022 28.7 (L) 34.0 - 46.6 % Final     MCV   Date Value Ref Range Status   01/24/2022 81.1 79.0 - 97.0 fL Final     MCH   Date Value Ref Range Status   01/24/2022 25.7 (L) 26.6 - 33.0 pg Final     MCHC   Date Value Ref Range Status   01/24/2022 31.7 31.5 - 35.7 g/dL Final     RDW   Date Value Ref Range Status   01/24/2022 26.4 (H) 12.3 - 15.4 % Final     RDW-SD   Date Value Ref Range Status   01/24/2022 73.1 (H) 37.0 - 54.0 fl Final     MPV   Date Value Ref Range Status   01/24/2022 9.8 6.0 - 12.0 fL Final     Platelets   Date Value Ref Range Status   01/24/2022 415 140 - 450 10*3/mm3 Final     Neutrophil %   Date Value Ref Range Status   10/16/2019 60.0 42.7 - 76.0 % Final     Lymphocyte %   Date Value Ref Range Status   10/16/2019 32.1 19.6 - 45.3 % Final     Monocyte %   Date Value Ref Range Status   01/19/2022 12.8 (H) 5.0 - 12.0 % Final     Eosinophil %   Date Value Ref Range Status   01/19/2022 4.3 0.3 - 6.2 % Final     Basophil %   Date Value Ref Range Status   10/16/2019 1.3 0.0 - 1.5 % Final     Neutrophils Absolute   Date Value Ref Range Status   01/17/2022 49.84 (H) 1.70 - 7.00 10*3/mm3 Final     Neutrophils, Absolute   Date Value Ref Range Status   10/16/2019 5.60 1.70 - 7.00 10*3/mm3 Final     Lymphocytes, Absolute   Date Value Ref Range Status   10/16/2019 3.00 0.70 - 3.10 10*3/mm3 Final     Monocytes, Absolute   Date Value Ref Range Status   01/19/2022 16.47 (H) 0.10 - 0.90 10*3/mm3 Final     Eosinophils, Absolute   Date Value Ref Range Status   01/19/2022 5.56 (H) 0.00 - 0.40 10*3/mm3 Final     Basophils Absolute   Date Value Ref Range Status   01/17/2022 14.82 (H) 0.00 - 0.20 10*3/mm3 Final     Basophils, Absolute   Date Value Ref Range Status   10/16/2019 0.10 0.00 - 0.20 10*3/mm3 Final     nRBC   Date Value Ref Range Status   01/17/2022 5.0 (H) 0.0 - 0.2 /100 WBC Final   10/16/2019 0.2 0.0 - 0.2 /100  WBC Final       Lab Results   Component Value Date    GLUCOSE 485 (C) 01/24/2022    BUN 10 01/24/2022    CREATININE 0.46 (L) 01/24/2022    EGFRIFNONA 146 01/24/2022    BCR 21.7 01/24/2022    K 4.2 01/24/2022    CO2 23.0 01/24/2022    CALCIUM 8.9 01/24/2022    ALBUMIN 3.40 (L) 01/24/2022    LABIL2 1.0 04/18/2019    AST 13 01/24/2022    ALT 12 01/24/2022         Assessment/Plan     Leukemia not having achieved remission, unspecified leukemia type (HCC)  - CBC & Differential  - Comprehensive Metabolic Panel  - Lactate Dehydrogenase  - Uric Acid  - Phosphorus  - ALPRAZolam (Xanax) 0.25 MG tablet    CLL (chronic lymphocytic leukemia) ([facility])  - CBC & Differential  - Comprehensive Metabolic Panel  - Lactate Dehydrogenase  - Uric Acid  - Phosphorus    CML (chronic myelocytic leukemia) ([facility])  - CBC & Differential  - Comprehensive Metabolic Panel  - Lactate Dehydrogenase  - Uric Acid  - Phosphorus  - ALPRAZolam (Xanax) 0.25 MG tablet      ASSESSMENT    ·  CML in chronic phase with no significant hematologic or molecular or cytogenetic response on   Imatinib.  ABL kinase mutational analysis was negative.  We  have represcribed to Tasigna 300 mg twice a day.  Patient has been noncompliant with treatments and ended up in the hospital with hyperleukocytosis, peripheral blood blasts.  Bone marrow biopsy suggest that she remains in chronic phase.  Continue to Cigna at the current doses.  Hydroxyurea 1 g twice a day will be continued.  Her hemoglobin has also improved she has not required blood transfusion for approximately 2 weeks  · Uncontrolled diabetes type 1  · Chronic anemia  · Insomnia  · Situational anxiety, not suicidal  · Hyperleukocytosis secondary to CML  · History of medical noncompliance  · Pulmonary embolism: Xarelto restarted  · Recent COVID-19 pneumonia  · History of prolonged QTC        Plans    · Continue close follow-ups  · Wean off Elavil since anxiety not improved  · Begin Celexa 10 mg p.o. every  morning  · Xanax 0.25 mg p.o. nightly as needed number 30 pills given till Celexa kicks in  · EKG in 2 weeks from now  · BCR ABL quantitative PCR  · Continue Tasigna 300 mg p.o. twice a day  · Continue hydroxyurea 1 g twice a day for now  · Reevaluate later on this week  · CMP, LDH, uric acid and phosphorus levels today  · All questions answered                I spent 40 total minutes, face-to-face, caring for Hope today.  90% of this time involved counseling and/or coordination of care as documented within this note.

## 2022-01-24 NOTE — PROGRESS NOTES
Oral Chemotherapy Teaching      Patient Name/:  Nieves Mc   1975  Oral Chemotherapy Regimen:  Tasigna 300mg PO BID  Date Started Medication: 22 during hospital visit    Initial Teaching Follow Up Comments     Safety     Storage instructions (away from children; away from heat/cold, sunlight, or moisture), handling - use of gloves (caregivers), washing hands after touching pills, managing waste     “How are you storing your medications?”, reminders on storage, proper handling (caregivers using gloves, washing hands, away from children, managing waste, etc.), disposal of medication with D/C or dosage change    Patient counseled on hazardous handling and storage precautions. Discussed plan to wash hands after handling. Caregivers to handle with latex gloves. Reviewed safe sex practices. Discussed appropriate management of bodily fluids. Store at room temperature, away from pets and children. Pt verbalized understanding.        Adherence      patient and/or caregiver on how to take medication, take with/without food, assess their adherence potential, stress importance of adherence, ways to manage adherence (pill boxes, phone reminders, calendars), what to do if miss a dose   “How are you taking your medication?” “How are you remembering to take your medication?”, “How many doses have you missed?”, determine reasons for non-adherence (not remembering, side effects, etc), ways to improve, overadherence? Remind patient of ways to improve/maintain adherence   Patient has a history of non-compliance.  Discussed the importance of taking Tasigna as directed.  Patient is also continues on Hydrea. Patient reports her mother is helping to manage her medications.  Discussed how to handle missed doses and to never take two doses at one time.      Side Effects/Adverse Reactions      patient on potential side effects, s/s, ways to manage, when to call MD/seek help     Determine if patient experiencing  side effects, ways to manage  Discussed potential side effects as well as how to manage and minimize.  Potential side effects include changes in liver changes, changes in electrolytes and lab values, rash or itchy skin, N/V, headache, fatigue, decreased PLT count and inc risk of bleeding, decreased wbc and inc risk of infection, increased risk of MI or stroke, edema, pancreas damage. Discussed with Hope when she should call the office and when she should seek emergency help.     Miscellaneous     Food interactions, DDIs, financial issues Determine if patient started any new medications since being placed on oral chemo (analyze for DDI) DDI with amitriptyline and Xarelto. Patient to be taken off of amitriptyline and started on Celexa with EKG check.  Patient to monitor for s/s of bleeding.     Additional Notes:  Discussed aforementioned material with patient in clinic. All questions and concerns addressed. Provided patient with my contact information and instructions to call should additional questions arise. Obtained signed CCA and consent. Provided patient with personalized treatment plan.  Patient also had complaints of anxiety, weight loss, and insomnia, as well as issues with the site of her bone marrow biopsy.  Was able to get patient scheduled with MD today.

## 2022-01-24 NOTE — OUTREACH NOTE
Medical Week 1 Survey      Responses   [facility] discharged from? [facility]   Does the patient have one of the following disease processes/diagnoses(primary or secondary)? Other   Week 1 attempt successful? Yes   Call start time 1036   Call end time 1043   Discharge diagnosis dyspnea d/t severe symptomatic anemia & recent COVID-19 PNA, CML, T2DM   Meds reviewed with patient/caregiver? Yes   Is the patient having any side effects they believe may be caused by any medication additions or changes? No   Does the patient have all medications ordered at discharge? No  [did not receive insulin from  [facility] at d/c, will get same dose refilled at pt's pharmacy]   What is keeping the patient from filling the prescriptions? --  [states has own insulin of same dose as ordered at d/c]   Nursing Interventions No intervention needed   Is the patient taking all medications as directed (includes completed medication regime)? Yes   Does the patient have a primary care provider?  Yes   Does the patient have an appointment with their PCP within 7 days of discharge? Yes   Has the patient kept scheduled appointments due by today? Yes   Has home health visited the patient within 72 hours of discharge? N/A   Psychosocial issues? No   Comments states has had insomnia due to response to CA and treatments, is being treated by oncologist   Did the patient receive a copy of their discharge instructions? Yes   Nursing interventions Reviewed instructions with patient   What is the patient's perception of their health status since discharge? Improving   Is the patient/caregiver able to teach back signs and symptoms related to disease process for when to call PCP? Yes   Is the patient/caregiver able to teach back signs and symptoms related to disease process for when to call 911? Yes   Is the patient/caregiver able to teach back the hierarchy of who to call/visit for symptoms/problems? PCP, Specialist, Home health nurse, Urgent  [facility], ED, 911 Yes   If the patient is a current smoker, are they able to teach back resources for cessation? Not a smoker   Week 1 call completed? Yes          Ángela Baez RN

## 2022-01-25 ENCOUNTER — TELEPHONE (OUTPATIENT)
Dept: ENDOCRINOLOGY | Facility: CLINIC | Age: 47
End: 2022-01-25

## 2022-01-25 ENCOUNTER — SPECIALTY PHARMACY (OUTPATIENT)
Dept: PHARMACY | Facility: HOSPITAL | Age: 47
End: 2022-01-25

## 2022-01-25 ENCOUNTER — TELEPHONE (OUTPATIENT)
Dept: ONCOLOGY | Facility: CLINIC | Age: 47
End: 2022-01-25

## 2022-01-25 ENCOUNTER — TELEPHONE (OUTPATIENT)
Dept: PAIN MEDICINE | Facility: CLINIC | Age: 47
End: 2022-01-25

## 2022-01-25 DIAGNOSIS — C95.90 LEUKEMIA NOT HAVING ACHIEVED REMISSION, UNSPECIFIED LEUKEMIA TYPE: Primary | ICD-10-CM

## 2022-01-25 NOTE — TELEPHONE ENCOUNTER
Attempted to call pt to let her know that her blood sugar was 485 and that she should follow-up with her PCP. No answer, VM not set up.

## 2022-01-25 NOTE — TELEPHONE ENCOUNTER
Caller: Sayra Cabezas  Relationship to Patient: MOTHER   Phone Number: [phone]  Reason for Call: PATIENTS MOTHER CALLING ASKING ABOUT A REFILL ON PAIN MEDICATION

## 2022-01-25 NOTE — TELEPHONE ENCOUNTER
----- Message from Meghann Lerma MD sent at 1/24/2022  4:13 PM EST -----  Patient's blood sugar is 485    She should call her PCP for instructions

## 2022-01-25 NOTE — PROGRESS NOTES
[facility] Labs Review: Tasigna + Hydrea        1/24/2022   WBC 3.40 - 10.80 10*3/mm3 131.67 (A)   Hemoglobin 12.0 - 15.9 g/dL 9.1 (A)   Hematocrit 34.0 - 46.6 % 28.7 (A)   Platelets 140 - 450 10*3/mm3 415   Creatinine 0.57 - 1.00 mg/dL 0.46 (A)   eGFR Non African Am >60 mL/min/1.73 146   BUN 6 - 20 mg/dL 10   Sodium 136 - 145 mmol/L 129 (A)   Potassium 3.5 - 5.2 mmol/L 4.2   Glucose 65 - 99 mg/dL 485 (A)   Calcium 8.6 - 10.5 mg/dL 8.9   Albumin 3.50 - 5.20 g/dL 3.40 (A)   Total Protein 6.0 - 8.5 g/dL 7.9   AST (SGOT) 1 - 32 U/L 13   ALT (SGPT) 1 - 33 U/L 12   Alkaline Phosphatase 39 - 117 U/L 650 (A)   Total Bilirubin 0.0 - 1.2 mg/dL 1.0     MD reviewed labs and referred to PCP for elevated BG. Pt sees endocrinology and labs sent for review. Repeat labs on 1/28/22.  Thanks,    Ros Martinez, PharmD

## 2022-01-25 NOTE — TELEPHONE ENCOUNTER
Left voicemail on patient's mother's phone (tried patient's phone but she did not have voicemail set up) to have Hope call and talk to the educators.

## 2022-01-25 NOTE — TELEPHONE ENCOUNTER
Ros Martinez with the cancer center sent a message Through Epic regarding her recent lab result:     IVIS Dominguez,   This is a mutual patient.  She had labs yesterday in our office with a glucose of 485.  Our office instructed her to follow up with her MD but just wanted to let you all know since she has had issues with compliance in the past.   Thanks,   Ros

## 2022-01-26 ENCOUNTER — TELEPHONE (OUTPATIENT)
Dept: ENDOCRINOLOGY | Facility: CLINIC | Age: 47
End: 2022-01-26

## 2022-01-26 ENCOUNTER — APPOINTMENT (OUTPATIENT)
Dept: LAB | Facility: HOSPITAL | Age: 47
End: 2022-01-26

## 2022-01-26 NOTE — TELEPHONE ENCOUNTER
I spoke with pt's mother Sayra. She has an appt with a PCP near her in a few days for pt's sugars. Advised that if they decide to stay with the PCP for BG management, then she needs to cancel her appt with Dr. Calles. She verbalized an understanding.

## 2022-01-27 ENCOUNTER — TELEPHONE (OUTPATIENT)
Dept: ONCOLOGY | Facility: CLINIC | Age: 47
End: 2022-01-27

## 2022-01-27 NOTE — TELEPHONE ENCOUNTER
Received a call from the pt's mother stating that the pt's Xarelto is $500. I told her that I would reach out to Austen, the financial counselor, to see if he has any resources. I also told her that the pt's blood sugar was very elevated on her labs so Dr. Lerma would like for her to follow-up with her PCP. She stated that they would follow-up with her PCP and the [facility]. She also stated that any calls should be made to her because the pt's phone does not get good reception.

## 2022-01-28 ENCOUNTER — APPOINTMENT (OUTPATIENT)
Dept: LAB | Facility: HOSPITAL | Age: 47
End: 2022-01-28

## 2022-01-28 ENCOUNTER — OFFICE VISIT (OUTPATIENT)
Dept: ONCOLOGY | Facility: CLINIC | Age: 47
End: 2022-01-28

## 2022-01-28 ENCOUNTER — LAB (OUTPATIENT)
Dept: LAB | Facility: HOSPITAL | Age: 47
End: 2022-01-28

## 2022-01-28 VITALS
HEIGHT: 64 IN | HEART RATE: 114 BPM | TEMPERATURE: 97.1 F | RESPIRATION RATE: 18 BRPM | DIASTOLIC BLOOD PRESSURE: 91 MMHG | SYSTOLIC BLOOD PRESSURE: 146 MMHG | WEIGHT: 128 LBS | BODY MASS INDEX: 21.85 KG/M2

## 2022-01-28 DIAGNOSIS — C95.90 LEUKEMIA NOT HAVING ACHIEVED REMISSION, UNSPECIFIED LEUKEMIA TYPE: ICD-10-CM

## 2022-01-28 DIAGNOSIS — C91.10 CLL (CHRONIC LYMPHOCYTIC LEUKEMIA): Primary | ICD-10-CM

## 2022-01-28 DIAGNOSIS — C95.90 LEUKEMIA NOT HAVING ACHIEVED REMISSION, UNSPECIFIED LEUKEMIA TYPE: Primary | ICD-10-CM

## 2022-01-28 DIAGNOSIS — C92.10 CML (CHRONIC MYELOCYTIC LEUKEMIA): ICD-10-CM

## 2022-01-28 LAB
BASOPHILS NFR BLD AUTO: ABNORMAL %
DEPRECATED RDW RBC AUTO: 77.6 FL (ref 37–54)
EOSINOPHIL # BLD AUTO: 2.18 10*3/MM3 (ref 0–0.4)
EOSINOPHIL NFR BLD AUTO: 3.1 % (ref 0.3–6.2)
ERYTHROCYTE [DISTWIDTH] IN BLOOD BY AUTOMATED COUNT: 27.4 % (ref 12.3–15.4)
HCT VFR BLD AUTO: 29.6 % (ref 34–46.6)
HGB BLD-MCNC: 8.7 G/DL (ref 12–15.9)
HOLD SPECIMEN: NORMAL
HOLD SPECIMEN: NORMAL
LYMPHOCYTES NFR BLD AUTO: ABNORMAL %
MCH RBC QN AUTO: 24.8 PG (ref 26.6–33)
MCHC RBC AUTO-ENTMCNC: 29.4 G/DL (ref 31.5–35.7)
MCV RBC AUTO: 84.3 FL (ref 79–97)
MONOCYTES # BLD AUTO: 5.33 10*3/MM3 (ref 0.1–0.9)
MONOCYTES NFR BLD AUTO: 7.5 % (ref 5–12)
NEUTROPHILS NFR BLD AUTO: ABNORMAL %
PLATELET # BLD AUTO: 255 10*3/MM3 (ref 140–450)
PMV BLD AUTO: 10.3 FL (ref 6–12)
RBC # BLD AUTO: 3.51 10*6/MM3 (ref 3.77–5.28)
WBC NRBC COR # BLD: 70.79 10*3/MM3 (ref 3.4–10.8)

## 2022-01-28 PROCEDURE — 85025 COMPLETE CBC W/AUTO DIFF WBC: CPT

## 2022-01-28 PROCEDURE — 36415 COLL VENOUS BLD VENIPUNCTURE: CPT

## 2022-01-28 PROCEDURE — 99215 OFFICE O/P EST HI 40 MIN: CPT | Performed by: INTERNAL MEDICINE

## 2022-01-28 NOTE — PROGRESS NOTES
Hematology/Oncology Outpatient Follow Up    PATIENT NAME:Nieves Mc  :1975  MRN: [id number]  PRIMARY CARE PHYSICIAN: Houston Oro MD  REFERRING PHYSICIAN: Provider, No Known    Chief Complaint   Patient presents with   • Follow-up     Leukemia not having achieved remission, unspecified leukemia type        HISTORY OF PRESENT ILLNESS:      Patient is a 43-year-old female with PMH significant for CML on   treatment with Imatinib.  Patient stated that she typically feels poorly with Imatinib with   frequent nausea and vomiting.  Also complained of weakness and fatigue.  Patient presented to ED   on 10/22/18 with complaints of an elevated blood sugar around 400.  Stated she felt poorly and   has had a productive cough with yellow sputum.  Complained of nausea and stated she was unable to   keep any food down anytime she ate.  The patient reported subjective fever without chills.  She   stated she had been coughing so hard that she was vomiting.  She denied hematemesis.  Denied   diarrhea, constipation or blood in her stool.       Patient’s chest x-ray showed no evidence of acute pulmonary embolus.  The patient has ground-glass   opacities throughout the lungs.  There is some air trapping noted which would appear to be   infectious.  Patient was started on antibiotics.      Hem/Onc was consulted on 10/23/18 for co-management of patient with CML.  Patient was seen by Dr. Lerma on 10/12 on previous admission for patient reported CML on Imatinib (Gleevec).  Patient   reports she is under the care of Dr. Roach at [facility] in [place].  Patient was   seen by Dr. Lerma in May 2018 when patient presented with hematuria, which was attributed to her   Imatinib.  Dr. Lerma followed during hospitalization but then patient returned to Dr. Roach   for her usual follow up.  She was again seen on 10/12.  Patient is stating she will switch to Dr. Lerma.    · Review of Dr. Roach’s note:   On 4/9/18 patient had BCR-abl which was 61.326.  Patient had   been on Imatinib 400 mg daily.  She had presented in March 2018 with WBC of 24, hemoglobin 13.1,   platelet count of 1,067,000.  Patient apparently had follow up BCR-abl in August 2018, results   are not available for review.  Her bone marrow aspiration and biopsy was also performed at that   time is currently not available for review.    · 11/6/18 - .  Uric acid 6.5.  BCR-abl by RT-PCR was measured at 3.36%.    · Due to thrombocytosis, patient was placed on Hydroxyurea 500 mg twice a day on 12/11/18.    Platelet count juana to 900,000.  Patient was noncompliant with CBCs that were recommended.    Patient was asked to return to the office after not being able to reach her.   · 12/27/18 - Bone marrow aspiration and biopsy was consistent with chronic myeloid leukemia.    BCR-abl positive, chronic phase.  ABL kinase mutational analysis is currently pending on the bone   marrow.    · 1/3/19 - Repeat CBC, WBC 17, hemoglobin 11.9, platelet count 1,072,000.  Hydroxyurea was   increased to 1 gm twice a day with follow up CBC.    · 1/6/19 - Follow up CBC showed progressive increase in platelet to 1.1 million.  Patient was   offered admission to the hospital, which she declined.  Hydroxyurea was increased to 1 gm three   times a day as of 1/6/19.   · 1/7/19 - EKG shows sinus tachycardia.   milliseconds.   · ABL kinase on peripheral blood was ordered on 1/11/19.  Based on sequence analysis, there was no   mutation detected.    · 2/12/19 - Patient was initiated on Tasigna 300 mg twice a day.  · 2/13/19 - Urinalysis was negative for hematuria.   · 2/22/19 -  milliseconds.  · 3/13/19 - EKG with QTC is 413 milliseconds.  Nonspecific changes noted.    · 4/18/19 - EKG showed QTC prolonged to 453 milliseconds.  This is changed from prior.   · 4/18/19 - Free T4 normal at 0.91.  Magnesium was 1.7.  BUN is 6.  Creatinine 0.7.  Bilirubin   2.2.   Phosphorous normal 3.7.  TSH 0.43, normal.  Free T3 was normal at 3.47.  Ionized calcium   normal at 1.23.  BCR-abl was 0.522%.    · 19 - EKG showed QTC of 441 milliseconds.  QT was 366.     · Patient has been lost to follow-up since 2019 for CML.  Patient states that she lost her insurance.  She also does not have any primary care physician at this time.  She is diabetic on insulin.  · Patient was slightly admitted to the hospital for pneumonia due to COVID-19 infection.  At that time patient made a decision to become compliant with treatment.  She is currently on to Cigna 300 mg p.o. twice a day.  She is also on hydroxyurea 1 g twice a day.  Overall she feels better, she has more energy.  Past Medical History:   Diagnosis Date   • Bone pain    • Extremity pain     Carlin. legs pain   • Leg pain     left leg greater   • Migraine    • Pulmonary embolism (HCC)    • Vision loss     doing surgery       Past Surgical History:   Procedure Laterality Date   • BONE MARROW BIOPSY     • BREAST SURGERY     •  SECTION     • CHOLECYSTECTOMY     • EYE SURGERY      laser surgery due  to hemmorage--- 2021-- another surgery  lt eye11/15/21   • SPINE SURGERY      Lombardi spinal block   • TUBAL ABDOMINAL LIGATION           Current Outpatient Medications:   •  allopurinol (ZYLOPRIM) 300 MG tablet, Take 1 tablet by mouth Daily for 30 days., Disp: 30 tablet, Rfl: 0  •  ALPRAZolam (Xanax) 0.25 MG tablet, Take 1 tablet by mouth At Night As Needed for Anxiety., Disp: 30 tablet, Rfl: 0  •  citalopram (CeleXA) 10 MG tablet, Take 1 tablet by mouth Daily. To begin 22, Disp: 30 tablet, Rfl: 1  •  hydroxyurea (HYDREA) 500 MG capsule, Take 1 capsule by mouth 2 (Two) Times a Day for 30 days. (Patient taking differently: Take 500 mg by mouth 2 (Two) Times a Day. Take 1000mg PO BID), Disp: 60 capsule, Rfl: 0  •  insulin NPH-insulin regular (humuLIN 70/30,novoLIN 70/30) (70-30) 100 UNIT/ML injection, Inject 40 Units under the skin  into the appropriate area as directed 2 (Two) Times a Day With Meals., Disp: 30 mL, Rfl: 12  •  Nilotinib HCl (TASIGNA) 150 MG capsule capsule, Take 2 capsules by mouth 2 (Two) Times a Day for 28 days. Take on EMPTY stomach, 1 hour before or 2 hours after a meal., Disp: 112 capsule, Rfl: 0  •  oxyCODONE-acetaminophen (PERCOCET)  MG per tablet, Take 1 tablet by mouth 2 (Two) Times a Day As Needed for Moderate Pain ., Disp: 60 tablet, Rfl: 0  •  rivaroxaban (XARELTO) 20 MG tablet, Take 1 tablet by mouth Daily., Disp: 90 tablet, Rfl: 0    Allergies   Allergen Reactions   • Hydromorphone GI Intolerance     dilaudid   • Morphine Nausea And Vomiting   • Propofol Hives       Family History   Problem Relation Age of Onset   • Diabetes Mother    • Diabetes Maternal Grandmother    • Heart attack Maternal Grandmother    • Stroke Maternal Grandmother        Cancer-related family history is not on file.    Social History     Tobacco Use   • Smoking status: Never Smoker   • Smokeless tobacco: Never Used   Vaping Use   • Vaping Use: Never used   Substance Use Topics   • Alcohol use: No   • Drug use: No       HPI, ROS and PFSH have been reviewed and confirmed on 1/28/2022.     SUBJECTIVE:    Complain of a lot of anxiety and insomnia.  There are no suicidal or homicidal ideations    She is accompanied today by her mother for this appointment        REVIEW OF SYSTEMS:  Review of Systems   Constitutional: Negative for chills and fever.   HENT: Negative for ear pain, mouth sores, nosebleeds and sore throat.    Eyes: Negative for photophobia and visual disturbance.   Respiratory: Negative for wheezing and stridor.    Cardiovascular: Negative for chest pain and palpitations.   Gastrointestinal: Negative for abdominal pain, diarrhea, nausea and vomiting.   Endocrine: Negative for cold intolerance and heat intolerance.   Genitourinary: Negative for dysuria and hematuria.   Musculoskeletal: Negative for joint swelling and neck  "stiffness.   Skin: Negative for color change and rash.   Neurological: Negative for seizures and syncope.   Hematological: Negative for adenopathy.        No obvious bleeding   Psychiatric/Behavioral: Negative for agitation, confusion and hallucinations.       OBJECTIVE:    Vitals:    01/28/22 1202   BP: 146/91   Pulse: 114   Resp: 18   Temp: 97.1 °F (36.2 °C)   Weight: 58.1 kg (128 lb)   Height: 162.6 cm (64\")   PainSc: 0-No pain     Body mass index is 21.97 kg/m².    ECOG  (1) Restricted in physically strenuous activity, ambulatory and able to do work of light nature    Physical Exam  Vitals and nursing note reviewed.   Constitutional:       General: She is not in acute distress.     Appearance: She is not diaphoretic.   HENT:      Head: Normocephalic and atraumatic.   Eyes:      General: No scleral icterus.        Right eye: No discharge.         Left eye: No discharge.      Conjunctiva/sclera: Conjunctivae normal.   Neck:      Thyroid: No thyromegaly.   Cardiovascular:      Rate and Rhythm: Normal rate and regular rhythm.      Heart sounds: Normal heart sounds. No friction rub. No gallop.    Pulmonary:      Effort: Pulmonary effort is normal. No respiratory distress.      Breath sounds: No stridor. No wheezing.   Abdominal:      General: Bowel sounds are normal.      Palpations: Abdomen is soft. There is no mass.      Tenderness: There is no abdominal tenderness. There is no guarding or rebound.   Musculoskeletal:         General: No tenderness. Normal range of motion.      Cervical back: Normal range of motion and neck supple.   Lymphadenopathy:      Cervical: No cervical adenopathy.   Skin:     General: Skin is warm.      Findings: No erythema or rash.   Neurological:      Mental Status: She is alert and oriented to person, place, and time.      Motor: No abnormal muscle tone.   Psychiatric:         Behavior: Behavior normal.     I have reexamined the patient and the results are consistent with the previously " documented exam. Meghann Leeann Lerma MD       RECENT LABS  WBC   Date Value Ref Range Status   01/28/2022 70.79 (C) 3.40 - 10.80 10*3/mm3 Final     RBC   Date Value Ref Range Status   01/28/2022 3.51 (L) 3.77 - 5.28 10*6/mm3 Final     Hemoglobin   Date Value Ref Range Status   01/28/2022 8.7 (L) 12.0 - 15.9 g/dL Final     Hematocrit   Date Value Ref Range Status   01/28/2022 29.6 (L) 34.0 - 46.6 % Final     MCV   Date Value Ref Range Status   01/28/2022 84.3 79.0 - 97.0 fL Final     MCH   Date Value Ref Range Status   01/28/2022 24.8 (L) 26.6 - 33.0 pg Final     MCHC   Date Value Ref Range Status   01/28/2022 29.4 (L) 31.5 - 35.7 g/dL Final     RDW   Date Value Ref Range Status   01/28/2022 27.4 (H) 12.3 - 15.4 % Final     RDW-SD   Date Value Ref Range Status   01/28/2022 77.6 (H) 37.0 - 54.0 fl Final     MPV   Date Value Ref Range Status   01/28/2022 10.3 6.0 - 12.0 fL Final     Platelets   Date Value Ref Range Status   01/28/2022 255 140 - 450 10*3/mm3 Final     Neutrophil %   Date Value Ref Range Status   10/16/2019 60.0 42.7 - 76.0 % Final     Lymphocyte %   Date Value Ref Range Status   10/16/2019 32.1 19.6 - 45.3 % Final     Monocyte %   Date Value Ref Range Status   01/28/2022 7.5 5.0 - 12.0 % Final     Eosinophil %   Date Value Ref Range Status   01/28/2022 3.1 0.3 - 6.2 % Final     Basophil %   Date Value Ref Range Status   10/16/2019 1.3 0.0 - 1.5 % Final     Neutrophils Absolute   Date Value Ref Range Status   01/17/2022 49.84 (H) 1.70 - 7.00 10*3/mm3 Final     Neutrophils, Absolute   Date Value Ref Range Status   10/16/2019 5.60 1.70 - 7.00 10*3/mm3 Final     Lymphocytes, Absolute   Date Value Ref Range Status   10/16/2019 3.00 0.70 - 3.10 10*3/mm3 Final     Monocytes, Absolute   Date Value Ref Range Status   01/28/2022 5.33 (H) 0.10 - 0.90 10*3/mm3 Final     Eosinophils, Absolute   Date Value Ref Range Status   01/28/2022 2.18 (H) 0.00 - 0.40 10*3/mm3 Final     Basophils Absolute   Date Value Ref  Range Status   01/17/2022 14.82 (H) 0.00 - 0.20 10*3/mm3 Final     Basophils, Absolute   Date Value Ref Range Status   10/16/2019 0.10 0.00 - 0.20 10*3/mm3 Final     nRBC   Date Value Ref Range Status   01/17/2022 5.0 (H) 0.0 - 0.2 /100 WBC Final   10/16/2019 0.2 0.0 - 0.2 /100 WBC Final       Lab Results   Component Value Date    GLUCOSE 485 (C) 01/24/2022    BUN 10 01/24/2022    CREATININE 0.46 (L) 01/24/2022    EGFRIFNONA 146 01/24/2022    BCR 21.7 01/24/2022    K 4.2 01/24/2022    CO2 23.0 01/24/2022    CALCIUM 8.9 01/24/2022    ALBUMIN 3.40 (L) 01/24/2022    LABIL2 1.0 04/18/2019    AST 13 01/24/2022    ALT 12 01/24/2022           ASSESSMENT    ·  CML in chronic phase with no significant hematologic or molecular or cytogenetic response on   Imatinib.  ABL kinase mutational analysis was negative.  We  have represcribed to Tasigna 300 mg twice a day.  Patient has been noncompliant with treatments and ended up in the hospital with hyperleukocytosis, peripheral blood blasts.  Bone marrow biopsy suggest that she remains in chronic phase.  Continue to Cigna at the current doses.  Hydroxyurea 1 g twice a day will be continued.  Her hemoglobin has also improved she has not required blood transfusion for approximately 2 weeks  Today her white count is down to 70,000, hemoglobin is 8.7 and platelets are 255  · Uncontrolled diabetes type 1, followed at the [facility] by Dr. Calles  · Chronic anemia: Stable, multifactorial from CML, to Tasigna, hydroxyurea  · Insomnia  · Situational anxiety, not suicidal  · Hyperleukocytosis secondary to CML  · History of medical noncompliance  · Pulmonary embolism: Xarelto restarted  · Recent COVID-19 pneumonia  · History of prolonged QTC        Plans    · Continue close follow-ups  · Wean off Elavil since anxiety not improved  · Begin Celexa 10 mg p.o. every morning.  Patient to start January 31, 2022  · Continue Xanax 0.25 mg p.o. nightly as needed number 30 pills given  till Celexa kicks in  · EKG in 2 weeks from now.  This has been ordered  · BCR ABL quantitative PCR  · Dietary referral  · Check with pharmacist for appropriate antinausea medication for her but will not prolong QT interval on her TKI  · Continue Tasigna 300 mg p.o. twice a day  · Continue hydroxyurea 1 g twice a day for now  · Follow-up with me in 3 weeks  · Weekly CBCs  · All questions answered                I spent 40 total minutes, face-to-face, caring for Hope today.  90% of this time involved counseling and/or coordination of care as documented within this note.

## 2022-01-31 DIAGNOSIS — Z79.899 ENCOUNTER FOR MONITORING CARDIOTOXIC DRUG THERAPY: Primary | ICD-10-CM

## 2022-01-31 DIAGNOSIS — Z51.81 ENCOUNTER FOR MONITORING CARDIOTOXIC DRUG THERAPY: Primary | ICD-10-CM

## 2022-01-31 RX ORDER — ONDANSETRON HYDROCHLORIDE 8 MG/1
8 TABLET, FILM COATED ORAL EVERY 8 HOURS PRN
Qty: 60 TABLET | Refills: 0 | Status: SHIPPED | OUTPATIENT
Start: 2022-01-31 | End: 2022-05-09

## 2022-02-01 ENCOUNTER — READMISSION MANAGEMENT (OUTPATIENT)
Dept: CALL CENTER | Facility: HOSPITAL | Age: 47
End: 2022-02-01

## 2022-02-01 NOTE — OUTREACH NOTE
Medical Week 2 Survey      Responses   [facility] patient discharged from? [facility]   Does the patient have one of the following disease processes/diagnoses(primary or secondary)? Other   Week 2 attempt successful? No   Unsuccessful attempts Attempt 1          Zully Luna LPN

## 2022-02-02 ENCOUNTER — LAB (OUTPATIENT)
Dept: LAB | Facility: HOSPITAL | Age: 47
End: 2022-02-02

## 2022-02-02 DIAGNOSIS — C91.10 CLL (CHRONIC LYMPHOCYTIC LEUKEMIA): ICD-10-CM

## 2022-02-02 DIAGNOSIS — C95.90 LEUKEMIA NOT HAVING ACHIEVED REMISSION, UNSPECIFIED LEUKEMIA TYPE: ICD-10-CM

## 2022-02-02 LAB
ALBUMIN SERPL-MCNC: 4 G/DL (ref 3.5–5.2)
ALBUMIN/GLOB SERPL: 1.1 G/DL
ALP SERPL-CCNC: 427 U/L (ref 39–117)
ALT SERPL W P-5'-P-CCNC: 51 U/L (ref 1–33)
ANION GAP SERPL CALCULATED.3IONS-SCNC: 9 MMOL/L (ref 5–15)
AST SERPL-CCNC: 74 U/L (ref 1–32)
BASOPHILS NFR BLD AUTO: ABNORMAL %
BILIRUB SERPL-MCNC: 0.6 MG/DL (ref 0–1.2)
BUN SERPL-MCNC: 8 MG/DL (ref 6–20)
BUN/CREAT SERPL: 16 (ref 7–25)
CALCIUM SPEC-SCNC: 9.7 MG/DL (ref 8.6–10.5)
CHLORIDE SERPL-SCNC: 92 MMOL/L (ref 98–107)
CO2 SERPL-SCNC: 28 MMOL/L (ref 22–29)
CREAT SERPL-MCNC: 0.5 MG/DL (ref 0.57–1)
DEPRECATED RDW RBC AUTO: 82.6 FL (ref 37–54)
EOSINOPHIL NFR BLD AUTO: ABNORMAL %
ERYTHROCYTE [DISTWIDTH] IN BLOOD BY AUTOMATED COUNT: 28 % (ref 12.3–15.4)
GFR SERPL CREATININE-BSD FRML MDRD: 133 ML/MIN/1.73
GLOBULIN UR ELPH-MCNC: 3.8 GM/DL
GLUCOSE SERPL-MCNC: 539 MG/DL (ref 65–99)
HCT VFR BLD AUTO: 33 % (ref 34–46.6)
HGB BLD-MCNC: 9.9 G/DL (ref 12–15.9)
LYMPHOCYTES NFR BLD AUTO: ABNORMAL %
MAGNESIUM SERPL-MCNC: 1.9 MG/DL (ref 1.6–2.6)
MCH RBC QN AUTO: 25.6 PG (ref 26.6–33)
MCHC RBC AUTO-ENTMCNC: 30 G/DL (ref 31.5–35.7)
MCV RBC AUTO: 85.3 FL (ref 79–97)
MONOCYTES NFR BLD AUTO: ABNORMAL %
NEUTROPHILS NFR BLD AUTO: ABNORMAL %
PLATELET # BLD AUTO: 156 10*3/MM3 (ref 140–450)
PMV BLD AUTO: 10 FL (ref 6–12)
POTASSIUM SERPL-SCNC: 5.5 MMOL/L (ref 3.5–5.2)
PROT SERPL-MCNC: 7.8 G/DL (ref 6–8.5)
RBC # BLD AUTO: 3.87 10*6/MM3 (ref 3.77–5.28)
SODIUM SERPL-SCNC: 129 MMOL/L (ref 136–145)
WBC NRBC COR # BLD: 38.26 10*3/MM3 (ref 3.4–10.8)

## 2022-02-02 PROCEDURE — 85025 COMPLETE CBC W/AUTO DIFF WBC: CPT

## 2022-02-02 PROCEDURE — 36415 COLL VENOUS BLD VENIPUNCTURE: CPT

## 2022-02-02 PROCEDURE — 83735 ASSAY OF MAGNESIUM: CPT

## 2022-02-02 PROCEDURE — 80053 COMPREHEN METABOLIC PANEL: CPT

## 2022-02-03 ENCOUNTER — READMISSION MANAGEMENT (OUTPATIENT)
Dept: CALL CENTER | Facility: HOSPITAL | Age: 47
End: 2022-02-03

## 2022-02-03 ENCOUNTER — TELEPHONE (OUTPATIENT)
Dept: ONCOLOGY | Facility: CLINIC | Age: 47
End: 2022-02-03

## 2022-02-03 ENCOUNTER — APPOINTMENT (OUTPATIENT)
Dept: LAB | Facility: HOSPITAL | Age: 47
End: 2022-02-03

## 2022-02-03 ENCOUNTER — SPECIALTY PHARMACY (OUTPATIENT)
Dept: PHARMACY | Facility: HOSPITAL | Age: 47
End: 2022-02-03

## 2022-02-03 ENCOUNTER — TELEPHONE (OUTPATIENT)
Dept: ONCOLOGY | Facility: HOSPITAL | Age: 47
End: 2022-02-03

## 2022-02-03 DIAGNOSIS — R79.89 ELEVATED LFTS: Primary | ICD-10-CM

## 2022-02-03 DIAGNOSIS — R94.5 ABNORMAL RESULTS OF LIVER FUNCTION STUDIES: ICD-10-CM

## 2022-02-03 DIAGNOSIS — E87.5 SERUM POTASSIUM ELEVATED: ICD-10-CM

## 2022-02-03 RX ORDER — SODIUM POLYSTYRENE SULFONATE 15 G/60ML
15 SUSPENSION ORAL; RECTAL ONCE
Qty: 60 ML | Refills: 0 | OUTPATIENT
Start: 2022-02-03 | End: 2022-02-03

## 2022-02-03 NOTE — TELEPHONE ENCOUNTER
Called pt's mother, Sayra, to discuss her recent labs. I told her that the pt's glucose was over 500. She stated that she started seeing a new PCP, Alida Latham NP who started her on insulin yesterday. She said that they checked her glucose level this morning and it had dropped to 300. I also told her that the pt's potassium was elevated. She stated that the pt is not on any potassium supplements, but stated that she has been drinking Gatorade. I advised that she cut back on the Gatorade since it is high in sugar and contains potassium. I asked if the pt would be able to come in to have her labs redrawn. She said that she might be able to bring her tomorrow afternoon, but she's not sure. I told her that I would send the lab orders to [facility] so they wouldn't have to drive as far. She asked if an ambulance could be called to transport the pt to the hospital for labs. I told her that we don't normally call EMS to transport for labs, but I would get Jennifer, the , involved to see if she could help with transportation. She verbalized understanding. Lab orders faxed to [facility] at [phone].

## 2022-02-03 NOTE — TELEPHONE ENCOUNTER
Case Management/ Note    Patient Name: Nieves Mc  YOB: 1975  MRN #: [id number]    OSW called patient's mother, Sayra at the request of CAMILA Mendez. Patient needs to have labs redrawn but they are unable to get her here to due to the weather. She has called [facility] and they are not running today due to weather. She was sent a transportation list to call cabs via email with her verbal permission.       Electronically signed by:   Jennifer Gupta LCSW, OSW-C  02/03/22, 09:52 EST

## 2022-02-03 NOTE — OUTREACH NOTE
Medical Week 2 Survey      Responses   [facility] patient discharged from? [facility]   Does the patient have one of the following disease processes/diagnoses(primary or secondary)? Other   Week 2 attempt successful? No   Unsuccessful attempts Attempt 2          Desi Tolbert RN

## 2022-02-03 NOTE — PROGRESS NOTES
[facility] Lab Review: Tasigna and Hydrea        2/2/2022   WBC 3.40 - 10.80 10*3/mm3 38.26 (A)   Hemoglobin 12.0 - 15.9 g/dL 9.9 (A)   Hematocrit 34.0 - 46.6 % 33.0 (A)   Platelets 140 - 450 10*3/mm3 156   Creatinine 0.57 - 1.00 mg/dL 0.50 (A)   eGFR Non African Am >60 mL/min/1.73 133   BUN 6 - 20 mg/dL 8   Sodium 136 - 145 mmol/L 129 (A)   Potassium 3.5 - 5.2 mmol/L 5.5 (A)   Glucose 65 - 99 mg/dL 539 (A)   Magnesium 1.6 - 2.6 mg/dL 1.9   Calcium 8.6 - 10.5 mg/dL 9.7   Albumin 3.50 - 5.20 g/dL 4.00   Total Protein 6.0 - 8.5 g/dL 7.8   AST (SGOT) 1 - 32 U/L 74 (A)   ALT (SGPT) 1 - 33 U/L 51 (A)   Alkaline Phosphatase 39 - 117 U/L 427 (A)   Total Bilirubin 0.0 - 1.2 mg/dL 0.6     Labs sent for MD/NP review and requesting patient to come back in for repeat labs and follow up with PCP for elevated BG.  Dr. Lerma's RN is contacting the patient to schedule and go over results.  Thanks,    Ros Martinez, PharmD

## 2022-02-04 ENCOUNTER — TELEPHONE (OUTPATIENT)
Dept: ONCOLOGY | Facility: CLINIC | Age: 47
End: 2022-02-04

## 2022-02-04 LAB — REF LAB TEST METHOD: NORMAL

## 2022-02-04 NOTE — TELEPHONE ENCOUNTER
"Received a call from the pt's mother stating that the pt is passing large blood clots when she goes to the bathroom. I asked where the blood is coming from and she said \"from where she pees.\" I asked if she was sure that it's not vaginal blood and she said that it's not. I spoke to Dr. Lerma who said that the pt should stop her blood thinner and go to the hospital to be evaluated. I called the pt's mother back and relayed this to her. When I told her that Dr. Lerma is advising her to go to the ER, the pt started yelling in the background that she was not going. I explained to the pt's mother that Dr. Lerma would like for her to be evaluated at the hospital and then we can follow-up with the pt next week to see if it is appropriate for her to restart her blood thinner. Pt's mother verbalized understanding.   "

## 2022-02-07 ENCOUNTER — TELEPHONE (OUTPATIENT)
Dept: ONCOLOGY | Facility: CLINIC | Age: 47
End: 2022-02-07

## 2022-02-07 ENCOUNTER — TELEPHONE (OUTPATIENT)
Dept: PAIN MEDICINE | Facility: CLINIC | Age: 47
End: 2022-02-07

## 2022-02-07 DIAGNOSIS — G90.522 COMPLEX REGIONAL PAIN SYNDROME TYPE 1 OF LEFT LOWER EXTREMITY: ICD-10-CM

## 2022-02-07 RX ORDER — OXYCODONE AND ACETAMINOPHEN 10; 325 MG/1; MG/1
1 TABLET ORAL 2 TIMES DAILY PRN
Qty: 60 TABLET | Refills: 0 | Status: SHIPPED | OUTPATIENT
Start: 2022-02-07 | End: 2022-02-14 | Stop reason: SDUPTHER

## 2022-02-07 NOTE — TELEPHONE ENCOUNTER
Caller: Nieves Mc A    Relationship: Self    Best call back number: [phone]    Who are you requesting to speak with (clinical staff, provider,  specific staff member): UNSURE    Do you know the name of the person who called:[id number]    What was the call regarding: PATIENT RECEIVED A MISSED CALL. PATIENT BELIEVES THI IS IN REGARD TO ASSISTANCE PROGRAM FOR CHEMO    Do you require a callback:YES

## 2022-02-08 ENCOUNTER — LAB (OUTPATIENT)
Dept: LAB | Facility: HOSPITAL | Age: 47
End: 2022-02-08

## 2022-02-08 ENCOUNTER — TELEPHONE (OUTPATIENT)
Dept: ONCOLOGY | Facility: CLINIC | Age: 47
End: 2022-02-08

## 2022-02-08 DIAGNOSIS — R31.9 HEMATURIA, UNSPECIFIED TYPE: ICD-10-CM

## 2022-02-08 DIAGNOSIS — R31.9 HEMATURIA, UNSPECIFIED TYPE: Primary | ICD-10-CM

## 2022-02-08 LAB
BACTERIA UR QL AUTO: ABNORMAL /HPF
BASOPHILS NFR BLD AUTO: ABNORMAL %
BILIRUB UR QL STRIP: NEGATIVE
CLARITY UR: CLEAR
COLOR UR: YELLOW
DEPRECATED RDW RBC AUTO: 87.4 FL (ref 37–54)
EOSINOPHIL # BLD AUTO: 0.71 10*3/MM3 (ref 0–0.4)
EOSINOPHIL NFR BLD AUTO: 1.8 % (ref 0.3–6.2)
ERYTHROCYTE [DISTWIDTH] IN BLOOD BY AUTOMATED COUNT: 28.5 % (ref 12.3–15.4)
GLUCOSE UR STRIP-MCNC: NEGATIVE MG/DL
HCT VFR BLD AUTO: 32.1 % (ref 34–46.6)
HGB BLD-MCNC: 9.4 G/DL (ref 12–15.9)
HGB UR QL STRIP.AUTO: NEGATIVE
HYALINE CASTS UR QL AUTO: ABNORMAL /LPF
KETONES UR QL STRIP: NEGATIVE
LEUKOCYTE ESTERASE UR QL STRIP.AUTO: NEGATIVE
LYMPHOCYTES NFR BLD AUTO: ABNORMAL %
MCH RBC QN AUTO: 26 PG (ref 26.6–33)
MCHC RBC AUTO-ENTMCNC: 29.3 G/DL (ref 31.5–35.7)
MCV RBC AUTO: 88.7 FL (ref 79–97)
MONOCYTES NFR BLD AUTO: ABNORMAL %
NEUTROPHILS NFR BLD AUTO: ABNORMAL %
NITRITE UR QL STRIP: NEGATIVE
PH UR STRIP.AUTO: 7 [PH] (ref 5–8)
PLATELET # BLD AUTO: 218 10*3/MM3 (ref 140–450)
PMV BLD AUTO: 9.8 FL (ref 6–12)
PROT UR QL STRIP: ABNORMAL
RBC # BLD AUTO: 3.62 10*6/MM3 (ref 3.77–5.28)
RBC # UR STRIP: ABNORMAL /HPF
REF LAB TEST METHOD: ABNORMAL
SP GR UR STRIP: 1.02 (ref 1–1.03)
SQUAMOUS #/AREA URNS HPF: ABNORMAL /HPF
UROBILINOGEN UR QL STRIP: ABNORMAL
WBC # UR STRIP: ABNORMAL /HPF
WBC NRBC COR # BLD: 38.91 10*3/MM3 (ref 3.4–10.8)

## 2022-02-08 PROCEDURE — 85025 COMPLETE CBC W/AUTO DIFF WBC: CPT

## 2022-02-08 PROCEDURE — 81001 URINALYSIS AUTO W/SCOPE: CPT

## 2022-02-08 PROCEDURE — 36415 COLL VENOUS BLD VENIPUNCTURE: CPT

## 2022-02-08 NOTE — TELEPHONE ENCOUNTER
Discussed a Tasigna copay card with the patient.  Patient gave permission to secure a copay card.  A copay card was secure and scanned today.

## 2022-02-08 NOTE — TELEPHONE ENCOUNTER
Received a call from the pt's mother stating that the pt is no longer passing clots when she urinates, but her legs have been bothering her. I asked if the pt went to the ER as advised last week. She stated that she refused to go. I told her I would speak to Dr. Lerma and call her back with her recommendations. She verbalized understanding. Dr. Lerma stated that the pt can restart her Xarelto, but should come in for a CBC and UA. Called pt's mother back. She said she could bring her in today. Appt scheduled.

## 2022-02-09 ENCOUNTER — SPECIALTY PHARMACY (OUTPATIENT)
Dept: PHARMACY | Facility: HOSPITAL | Age: 47
End: 2022-02-09

## 2022-02-09 LAB — REF LAB TEST METHOD: NORMAL

## 2022-02-09 NOTE — PROGRESS NOTES
[facility] Lab Review : Tasigna + Hydrea        2/8/2022   WBC 3.40 - 10.80 10*3/mm3 38.91 (A)   Hemoglobin 12.0 - 15.9 g/dL 9.4 (A)   Hematocrit 34.0 - 46.6 % 32.1 (A)   Platelets 140 - 450 10*3/mm3 218     Spoke with patient mother, she reports Tasigna delivery is set up. She also reports that Hope is now wearing a blood glucose monitor and her blood sugar is down to 172.  She is requesting that the EKG be done at [facility].  Asked for Dr. Lerma's team to send an order to have that completed.  Thanks,    Ros Martinez, PharmD

## 2022-02-10 ENCOUNTER — READMISSION MANAGEMENT (OUTPATIENT)
Dept: CALL CENTER | Facility: HOSPITAL | Age: 47
End: 2022-02-10

## 2022-02-10 ENCOUNTER — TELEPHONE (OUTPATIENT)
Dept: ONCOLOGY | Facility: CLINIC | Age: 47
End: 2022-02-10

## 2022-02-10 NOTE — TELEPHONE ENCOUNTER
Caller: Sayra Cabezas    Relationship: Mother    Best call back number: [phone]    What is the best time to reach you: ANYTIME     Who are you requesting to speak with (clinical staff, provider,  specific staff member): NURSE     What was the call regarding: MOM CALLED REGARDING AN ORDER FOR AN EKG FOR PATIENT TO HAVE DONE AT [facility]     Do you require a callback: PLEASE CALL MOM BACK TO DISCUSS AND ADVISE

## 2022-02-10 NOTE — OUTREACH NOTE
Medical Week 3 Survey      Responses   [facility] [place] patient discharged from? [facility]   Does the patient have one of the following disease processes/diagnoses(primary or secondary)? Other   Week 3 attempt successful? No   Unsuccessful attempts Attempt 1          Zully Luna LPN

## 2022-02-11 ENCOUNTER — SPECIALTY PHARMACY (OUTPATIENT)
Dept: PHARMACY | Facility: HOSPITAL | Age: 47
End: 2022-02-11

## 2022-02-11 ENCOUNTER — TELEPHONE (OUTPATIENT)
Dept: ONCOLOGY | Facility: CLINIC | Age: 47
End: 2022-02-11

## 2022-02-11 NOTE — TELEPHONE ENCOUNTER
Caller: Samuel Cabezas    Relationship: Mother    Best call back number: [phone]    Caller requesting test results: SAMUEL    What test was performed:EKG    When was the test performed: 2/10/2022    Where was the test performed: [facility] NOT ON  VERBAL RELEASE

## 2022-02-14 ENCOUNTER — READMISSION MANAGEMENT (OUTPATIENT)
Dept: CALL CENTER | Facility: HOSPITAL | Age: 47
End: 2022-02-14

## 2022-02-14 ENCOUNTER — OFFICE VISIT (OUTPATIENT)
Dept: PAIN MEDICINE | Facility: CLINIC | Age: 47
End: 2022-02-14

## 2022-02-14 VITALS
RESPIRATION RATE: 16 BRPM | HEART RATE: 101 BPM | HEIGHT: 64 IN | DIASTOLIC BLOOD PRESSURE: 79 MMHG | OXYGEN SATURATION: 100 % | BODY MASS INDEX: 20.14 KG/M2 | WEIGHT: 118 LBS | SYSTOLIC BLOOD PRESSURE: 126 MMHG

## 2022-02-14 DIAGNOSIS — G90.522 COMPLEX REGIONAL PAIN SYNDROME TYPE 1 OF LEFT LOWER EXTREMITY: ICD-10-CM

## 2022-02-14 PROCEDURE — 99214 OFFICE O/P EST MOD 30 MIN: CPT | Performed by: STUDENT IN AN ORGANIZED HEALTH CARE EDUCATION/TRAINING PROGRAM

## 2022-02-14 RX ORDER — OXYCODONE AND ACETAMINOPHEN 10; 325 MG/1; MG/1
1 TABLET ORAL 2 TIMES DAILY PRN
Qty: 60 TABLET | Refills: 0 | Status: SHIPPED | OUTPATIENT
Start: 2022-03-05 | End: 2022-04-07 | Stop reason: SDUPTHER

## 2022-02-14 RX ORDER — GABAPENTIN 300 MG/1
300 CAPSULE ORAL 3 TIMES DAILY
Qty: 90 CAPSULE | Refills: 0 | Status: SHIPPED | OUTPATIENT
Start: 2022-02-14 | End: 2022-05-09 | Stop reason: SDUPTHER

## 2022-02-14 NOTE — PROGRESS NOTES
CHIEF COMPLAINT  Chief Complaint   Patient presents with   • Leg Pain     bilateral,Oxycodone 0500 2/14/21       Primary Care  Houston Oro MD    Subjective   Nieves Mc is a 46 y.o. female  who presents for cancer-related pain and left lower extremity allodynia.  She states that she is having progressively worsening pain for many years following the diagnosis of CML.  She has been previously treated with over-the-counter medications which are no longer effective.  She states in the past, she had taken some hydrocodone which was also not effective.  She is unable to tolerate gabapentin due to side effects.  In addition to describing generalized leg and body aches from her cancer, she also describes allodynia-like symptoms in the left leg.  She states is difficult for her to wear shoes due to significant pain in the left foot.  She also describes pain with light touch of the left lower extremity.  She endorses swelling in the left lower extremity as well as changing of temperature left lower extremity.  She also has significant trouble walking and some weakness    Leg Pain   Associated symptoms include numbness.   Extremity Pain   Associated symptoms include numbness.        Location: Whole body, left lower extremity  Onset: Years ago  Duration: Progressively worsening  Timing: Constant throughout the day  Quality: Aching sharp pains in the left lower extremity  Severity: Today: 6       Last Week: 6       Worst: 10  Modifying Factors: The pain is worse with any type of movement and physical activity.  Pain is also exacerbated by light touch of the left lower extremity    Physical Therapy: no    Interval Update 02/14/2022: She is now complaining more bilateral symptoms.  It appears that overall she has done very well following lumbar sympathetic block.  She is able to walk and can wear shoes and socks.  She is primarily complaining of burning sensation in the lateral aspect of the legs bilaterally  especially below the knee and into the top of the foot.  She recently had extremely high blood glucose.  It was reportedly greater than 500.    The following portions of the patient's history were reviewed and updated as appropriate: allergies, current medications, past family history, past medical history, past social history, past surgical history and problem list.      Current Outpatient Medications:   •  allopurinol (ZYLOPRIM) 300 MG tablet, Take 1 tablet by mouth Daily for 30 days., Disp: 30 tablet, Rfl: 0  •  ALPRAZolam (Xanax) 0.25 MG tablet, Take 1 tablet by mouth At Night As Needed for Anxiety., Disp: 30 tablet, Rfl: 0  •  citalopram (CeleXA) 10 MG tablet, Take 1 tablet by mouth Daily. To begin 1/31/22, Disp: 30 tablet, Rfl: 1  •  hydroxyurea (HYDREA) 500 MG capsule, Take 1 capsule by mouth 2 (Two) Times a Day for 30 days. (Patient taking differently: Take 500 mg by mouth 2 (Two) Times a Day. Take 1000mg PO BID), Disp: 60 capsule, Rfl: 0  •  insulin NPH-insulin regular (humuLIN 70/30,novoLIN 70/30) (70-30) 100 UNIT/ML injection, Inject 40 Units under the skin into the appropriate area as directed 2 (Two) Times a Day With Meals., Disp: 30 mL, Rfl: 12  •  Nilotinib HCl (TASIGNA) 150 MG capsule capsule, Take 2 capsules by mouth 2 (Two) Times a Day for 28 days. Take on EMPTY stomach, 1 hour before or 2 hours after a meal., Disp: 112 capsule, Rfl: 0  •  ondansetron (ZOFRAN) 8 MG tablet, Take 1 tablet by mouth Every 8 (Eight) Hours As Needed for Nausea or Vomiting., Disp: 60 tablet, Rfl: 0  •  [START ON 3/5/2022] oxyCODONE-acetaminophen (PERCOCET)  MG per tablet, Take 1 tablet by mouth 2 (Two) Times a Day As Needed for Moderate Pain ., Disp: 60 tablet, Rfl: 0  •  rivaroxaban (XARELTO) 20 MG tablet, Take 1 tablet by mouth Daily., Disp: 90 tablet, Rfl: 0  •  gabapentin (NEURONTIN) 300 MG capsule, Take 1 capsule by mouth 3 (Three) Times a Day., Disp: 90 capsule, Rfl: 0    Review of Systems   Constitutional:  "Positive for fatigue.   Musculoskeletal: Positive for arthralgias and gait problem.   Neurological: Positive for weakness and numbness.       Vitals:    02/14/22 0850   BP: 126/79   Pulse: 101   Resp: 16   SpO2: 100%   Weight: 53.5 kg (118 lb)   Height: 162.6 cm (64.02\")   PainSc:   6       Urine Drug Screen: 6/7/2021  Appropriate: Yes    Objective   Physical Exam  Vitals and nursing note reviewed.   Constitutional:       General: She is not in acute distress.     Appearance: Normal appearance. She is normal weight.   Musculoskeletal:         General: Swelling and tenderness present.      Comments: Left lower extremity:  1.  Diffusely tender to even light palpation with signs of allodynia in the left leg and left foot  2.  Swelling present in the left foot compared to the right foot  3.  Slight decreased temperature in the left leg compared to the right leg   Skin:     General: Skin is warm and dry.   Neurological:      Mental Status: She is alert.           Assessment/Plan   Problems Addressed this Visit     None      Visit Diagnoses     Complex regional pain syndrome type 1 of left lower extremity        Relevant Medications    oxyCODONE-acetaminophen (PERCOCET)  MG per tablet (Start on 3/5/2022)      Diagnoses       Codes Comments    Complex regional pain syndrome type 1 of left lower extremity     ICD-10-CM: G90.522  ICD-9-CM: 337.22           Plan:  1. Refill oxycodone and Elavil  2. MRI essentially unremarkable  3. Given her bilateral symptoms now, I suspect that her main issue is more of a diabetic neuropathy picture.  I feel that the CRPS is much improved  4. Start gabapentin 300 mg 3 times daily.  We will see back in 1 month.  If she continues to experience significant pain, can plan for spinal cord stimulation  --- Follow-up 1 month           INSPECT REPORT    As part of the patient's treatment plan, I may be prescribing controlled substances. The patient has been made aware of appropriate use of " such medications, including potential risk of somnolence, limited ability to drive and/or work safely, and the potential for dependence or overdose. It has also bee made clear that these medications are for use by this patient only, without concomitant use of alcohol or other substances unless prescribed.     Patient has completed prescribing agreement detailing terms of continued prescribing of controlled substances, including monitoring SMITH reports, urine drug screening, and pill counts if necessary. The patient is aware that inappropriate use will results in cessation of prescribing such medications.    INSPECT report has been reviewed and scanned into the patient's chart.    As the clinician, I personally reviewed the INSPECT from 2/11/2022.    History and physical exam exhibit continued safe and appropriate use of controlled substances.      EMR Dragon/Transcription disclaimer:   Much of this encounter note is an electronic transcription/translation of spoken language to printed text. The electronic translation of spoken language may permit erroneous, or at times, nonsensical words or phrases to be inadvertently transcribed; Although I have reviewed the note for such errors, some may still exist.

## 2022-02-14 NOTE — OUTREACH NOTE
Medical Week 3 Survey      Responses   [facility] patient discharged from? [facility]   Does the patient have one of the following disease processes/diagnoses(primary or secondary)? Other   Week 3 attempt successful? No   Unsuccessful attempts Attempt 2          Vida Yeh RN

## 2022-02-15 ENCOUNTER — TELEPHONE (OUTPATIENT)
Dept: ONCOLOGY | Facility: CLINIC | Age: 47
End: 2022-02-15

## 2022-02-15 DIAGNOSIS — R94.31 ABNORMAL EKG: Primary | ICD-10-CM

## 2022-02-15 NOTE — TELEPHONE ENCOUNTER
Called pt to let her know that Dr. Lerma reviewed her EKG and she should hold her Celexa and have the EKG repeated next week. She verbalized understanding. I asked if she wanted it done at [facility] or at [facility]. She stated that she wasn't sure. I told her I would enter the order and if she decides she wants it faxed to [facility] she can let me know. She verbalized understanding.

## 2022-02-16 ENCOUNTER — SPECIALTY PHARMACY (OUTPATIENT)
Dept: PHARMACY | Facility: HOSPITAL | Age: 47
End: 2022-02-16

## 2022-02-16 NOTE — TELEPHONE ENCOUNTER
Caller: Sayra Cabezas    Relationship: Mother    Best call back number: [phone]    Who are you requesting to speak with (clinical staff, provider,  specific staff member):CLINICAL    What was the call regarding: PATIENT WAS PLACED ON NEW MEDICATION BY PAIN MANAGEMENT.    PATIENT WAS PRESCRIBED GABAPENTIN.   DOES PATIENT NEED TO WITHOLD THIS MEDICATION FOR EKG?    Do you require a callback: YES

## 2022-02-16 NOTE — TELEPHONE ENCOUNTER
Called pt and let her mother know that the pt did not have to withhold her gabapentin before her ekg. Pt vocied understanding

## 2022-02-16 NOTE — PROGRESS NOTES
[facility] Delivery Note: Tasigna    Confirmed with pharmacy that medication was delivered to patient on 2/11/22.  Thanks,    Ros Martinez, PharmD

## 2022-02-17 ENCOUNTER — TELEPHONE (OUTPATIENT)
Dept: ONCOLOGY | Facility: CLINIC | Age: 47
End: 2022-02-17

## 2022-02-17 NOTE — TELEPHONE ENCOUNTER
Caller: Sayra Cabezas    Relationship to patient: Mother     NO BH VERBAL RELEASE    Best call back number: [phone]    Chief complaint: R/S APPT 02/24    Type of visit: LAB AND F/U     Requested date: 02/23 NEED AT 4PM OR AFTER FOR APPT TIME     If rescheduling, when is the original appointment: 02/24    Additional notes:

## 2022-02-22 NOTE — TELEPHONE ENCOUNTER
Called pt's mother, Sayra, to let her know that the EKG order has been faxed to [facility]. She then asked how the pt is going to have eye surgery on Thursday. I asked what kind of surgery the pt is having and she didn't know, but said that they want a copy of the EKG signed by Dr. Lerma. I asked where she was having the surgery and she was unsure of that as well. I asked her to call me back with this information and I will contact that office to find out what they need. She verbalized understanding.

## 2022-02-22 NOTE — TELEPHONE ENCOUNTER
Caller: Samuel Cabezas    Relationship: Mother    Best call back number: [phone]    What is the best time to reach you: ANYTIME    CAN LEAVE VM ON THIS NUMBER IF UNABLE TO REACH     Who are you requesting to speak with (clinical staff, provider,  specific staff member): CLINICAL     Do you know the name of the person who called: SAMUEL    What was the call regarding:   HAD SPOKE WITH SOMEONE YESTERDAY AND SUPPOSED TO HAVE EKG ORDER SENT TO [facility] , WENT YESTERDAY AND THEY DID NOT HAVE IT AND OFFICE WAS CLOSED BY THAT TIME, HOPE IS NEEDING TO COMPLETE THIS TODAY IF COULD PLEASE GET ORDER SENT TO [facility] SO CAN GET EKG COMPLETED TODAY         Do you require a callback: YES   PLEASE CALL ONCE ORDER IS SENT OVER

## 2022-02-23 ENCOUNTER — TELEPHONE (OUTPATIENT)
Dept: ONCOLOGY | Facility: CLINIC | Age: 47
End: 2022-02-23

## 2022-02-23 NOTE — TELEPHONE ENCOUNTER
Spoke with Anneliese Naik and Senia who explained that the pt has a macular hole in her left eye and they just need a copy of her recent EKG. I told her I would request a copy from [facility] [place] [place] and fax it to her. I asked if any further clearance was needed or if the pt would need to be off her blood thinners. She said they do not need a clearance letter from Dr. Lerma and most patients remain on their blood thinner. Message left on [facility] [place] [place] [facility]' VM.

## 2022-02-24 ENCOUNTER — APPOINTMENT (OUTPATIENT)
Dept: LAB | Facility: HOSPITAL | Age: 47
End: 2022-02-24

## 2022-02-28 ENCOUNTER — SPECIALTY PHARMACY (OUTPATIENT)
Dept: PHARMACY | Facility: HOSPITAL | Age: 47
End: 2022-02-28

## 2022-02-28 NOTE — PROGRESS NOTES
Specialty Pharmacy Note: Aram Pickett had repeat on 2/22 QT/QTc =354/447 after results on 2/10/22 of344/460.  Celexa has been on hold.  Medication list reviewed  and Tasigna and ondansetron are the two medications with potential for QT prolongation.  I know her mom thinks she does need anxiety treated.  Celexa is considered moderate for increasing QT. Both Prozac and Zoloft are low to medium risk of increasing QT.     Chart copied from [employer]:    Psychotropic drugs   Antipsychotics:  0 High risk: Chlorpromazine, IV haloperidol, ziprasidone  0 Moderate risk: Amisulpride (oral)¶, clozapine, flupentixol¶, haloperidol (oral), olanzapine, pimozide, quetiapine, risperidone, thioridazine  0 Low to moderate risk: Asenapine, iloperidone, paliperidone, periciazine¶, pimavanserin   Tricyclic and tetracyclic antidepressants (TCAs):†  0 Moderate risk: Clomipramine, doxepin, and imipramine   Selective serotonin reuptake inhibitors (SSRIs):  0 Moderate risk: Citalopram, escitalopram  0 Low to moderate risk: Fluoxetine, sertraline   Others:  0 Low to moderate risk: Atomoxetine, trazodone     Info sent to Dr. Lerma for her review. Patient follows up with her in the clinic tomorrow.    Thanks,    Ros Martinez, PharmD

## 2022-03-01 ENCOUNTER — LAB (OUTPATIENT)
Dept: LAB | Facility: HOSPITAL | Age: 47
End: 2022-03-01

## 2022-03-01 ENCOUNTER — OFFICE VISIT (OUTPATIENT)
Dept: ONCOLOGY | Facility: CLINIC | Age: 47
End: 2022-03-01

## 2022-03-01 VITALS
BODY MASS INDEX: 20.14 KG/M2 | WEIGHT: 118 LBS | HEIGHT: 64 IN | TEMPERATURE: 97.3 F | RESPIRATION RATE: 18 BRPM | DIASTOLIC BLOOD PRESSURE: 81 MMHG | SYSTOLIC BLOOD PRESSURE: 130 MMHG | HEART RATE: 118 BPM

## 2022-03-01 DIAGNOSIS — C95.90 LEUKEMIA NOT HAVING ACHIEVED REMISSION, UNSPECIFIED LEUKEMIA TYPE: ICD-10-CM

## 2022-03-01 DIAGNOSIS — C91.10 CLL (CHRONIC LYMPHOCYTIC LEUKEMIA): ICD-10-CM

## 2022-03-01 DIAGNOSIS — C92.10 CML (CHRONIC MYELOCYTIC LEUKEMIA): ICD-10-CM

## 2022-03-01 DIAGNOSIS — C95.90 LEUKEMIA NOT HAVING ACHIEVED REMISSION, UNSPECIFIED LEUKEMIA TYPE: Primary | ICD-10-CM

## 2022-03-01 LAB
ANION GAP SERPL CALCULATED.3IONS-SCNC: 12 MMOL/L (ref 5–15)
BASOPHILS NFR BLD AUTO: ABNORMAL %
BUN SERPL-MCNC: 11 MG/DL (ref 6–20)
BUN/CREAT SERPL: 18 (ref 7–25)
CALCIUM SPEC-SCNC: 9.7 MG/DL (ref 8.6–10.5)
CHLORIDE SERPL-SCNC: 99 MMOL/L (ref 98–107)
CO2 SERPL-SCNC: 26 MMOL/L (ref 22–29)
CREAT SERPL-MCNC: 0.61 MG/DL (ref 0.57–1)
DEPRECATED RDW RBC AUTO: 67.7 FL (ref 37–54)
EGFRCR SERPLBLD CKD-EPI 2021: 111.8 ML/MIN/1.73
EOSINOPHIL # BLD AUTO: 1.93 10*3/MM3 (ref 0–0.4)
EOSINOPHIL NFR BLD AUTO: 3.6 % (ref 0.3–6.2)
ERYTHROCYTE [DISTWIDTH] IN BLOOD BY AUTOMATED COUNT: 23.6 % (ref 12.3–15.4)
GLUCOSE SERPL-MCNC: 151 MG/DL (ref 65–99)
HCT VFR BLD AUTO: 35.7 % (ref 34–46.6)
HGB BLD-MCNC: 11.7 G/DL (ref 12–15.9)
LYMPHOCYTES NFR BLD AUTO: ABNORMAL %
MCH RBC QN AUTO: 27 PG (ref 26.6–33)
MCHC RBC AUTO-ENTMCNC: 32.8 G/DL (ref 31.5–35.7)
MCV RBC AUTO: 82.3 FL (ref 79–97)
MONOCYTES NFR BLD AUTO: ABNORMAL %
NEUTROPHILS NFR BLD AUTO: ABNORMAL %
PLATELET # BLD AUTO: 472 10*3/MM3 (ref 140–450)
PMV BLD AUTO: 9.9 FL (ref 6–12)
POTASSIUM SERPL-SCNC: 4.3 MMOL/L (ref 3.5–5.2)
RBC # BLD AUTO: 4.34 10*6/MM3 (ref 3.77–5.28)
SODIUM SERPL-SCNC: 137 MMOL/L (ref 136–145)
WBC NRBC COR # BLD: 53.92 10*3/MM3 (ref 3.4–10.8)

## 2022-03-01 PROCEDURE — 36415 COLL VENOUS BLD VENIPUNCTURE: CPT

## 2022-03-01 PROCEDURE — 99214 OFFICE O/P EST MOD 30 MIN: CPT | Performed by: INTERNAL MEDICINE

## 2022-03-01 PROCEDURE — 80048 BASIC METABOLIC PNL TOTAL CA: CPT

## 2022-03-01 PROCEDURE — 85025 COMPLETE CBC W/AUTO DIFF WBC: CPT

## 2022-03-01 RX ORDER — OFLOXACIN 3 MG/ML
SOLUTION/ DROPS OPHTHALMIC
COMMUNITY
Start: 2022-02-15 | End: 2022-05-09

## 2022-03-01 RX ORDER — PREDNISOLONE ACETATE 10 MG/ML
SUSPENSION/ DROPS OPHTHALMIC
COMMUNITY
Start: 2022-02-15 | End: 2022-05-09

## 2022-03-03 ENCOUNTER — TELEPHONE (OUTPATIENT)
Dept: ONCOLOGY | Facility: CLINIC | Age: 47
End: 2022-03-03

## 2022-03-03 ENCOUNTER — SPECIALTY PHARMACY (OUTPATIENT)
Dept: PHARMACY | Facility: HOSPITAL | Age: 47
End: 2022-03-03

## 2022-03-03 DIAGNOSIS — R94.31 ABNORMAL EKG: Primary | ICD-10-CM

## 2022-03-03 NOTE — TELEPHONE ENCOUNTER
Called pt's mother, Sayra, to discuss changing her Celexa to Zoloft due to EKG changes and the need for follow-up EKG next week. When I told her that Dr. Lerma would like for the pt to try Zoloft, I heard the pt yelling in the background that she was not changing medications and she wouldn't take it. I explained that the Celexa is affecting her heart rhythm so Dr. Lerma wants her to switch. Nieves and Sayra argued for a little bit and then Sayra came back on the line and said to send in the medication. I also asked if she wants to have the repeat EKG at [facility]. She confirmed that they wanted it done at [facility]. Order faxed to [facility] at [phone].

## 2022-03-07 ENCOUNTER — LAB (OUTPATIENT)
Dept: LAB | Facility: HOSPITAL | Age: 47
End: 2022-03-07

## 2022-03-07 DIAGNOSIS — C92.10 CML (CHRONIC MYELOCYTIC LEUKEMIA): ICD-10-CM

## 2022-03-07 DIAGNOSIS — C91.10 CLL (CHRONIC LYMPHOCYTIC LEUKEMIA): ICD-10-CM

## 2022-03-07 DIAGNOSIS — C95.90 LEUKEMIA NOT HAVING ACHIEVED REMISSION, UNSPECIFIED LEUKEMIA TYPE: ICD-10-CM

## 2022-03-07 LAB
ANION GAP SERPL CALCULATED.3IONS-SCNC: 12 MMOL/L (ref 5–15)
BASOPHILS NFR BLD AUTO: ABNORMAL %
BUN SERPL-MCNC: 10 MG/DL (ref 6–20)
BUN/CREAT SERPL: 14.9 (ref 7–25)
CALCIUM SPEC-SCNC: 9.3 MG/DL (ref 8.6–10.5)
CHLORIDE SERPL-SCNC: 102 MMOL/L (ref 98–107)
CO2 SERPL-SCNC: 22 MMOL/L (ref 22–29)
CREAT SERPL-MCNC: 0.67 MG/DL (ref 0.57–1)
DEPRECATED RDW RBC AUTO: 67.1 FL (ref 37–54)
EGFRCR SERPLBLD CKD-EPI 2021: 109.3 ML/MIN/1.73
EOSINOPHIL # BLD AUTO: 1.18 10*3/MM3 (ref 0–0.4)
EOSINOPHIL NFR BLD AUTO: 2.9 % (ref 0.3–6.2)
ERYTHROCYTE [DISTWIDTH] IN BLOOD BY AUTOMATED COUNT: 22.4 % (ref 12.3–15.4)
GLUCOSE SERPL-MCNC: 283 MG/DL (ref 65–99)
HCT VFR BLD AUTO: 31.4 % (ref 34–46.6)
HGB BLD-MCNC: 9.8 G/DL (ref 12–15.9)
LYMPHOCYTES NFR BLD AUTO: ABNORMAL %
MCH RBC QN AUTO: 26.9 PG (ref 26.6–33)
MCHC RBC AUTO-ENTMCNC: 31.2 G/DL (ref 31.5–35.7)
MCV RBC AUTO: 86.3 FL (ref 79–97)
MONOCYTES NFR BLD AUTO: ABNORMAL %
NEUTROPHILS NFR BLD AUTO: ABNORMAL %
PLATELET # BLD AUTO: 210 10*3/MM3 (ref 140–450)
PMV BLD AUTO: 9.9 FL (ref 6–12)
POTASSIUM SERPL-SCNC: 4.1 MMOL/L (ref 3.5–5.2)
RBC # BLD AUTO: 3.64 10*6/MM3 (ref 3.77–5.28)
SODIUM SERPL-SCNC: 136 MMOL/L (ref 136–145)
WBC NRBC COR # BLD: 40.28 10*3/MM3 (ref 3.4–10.8)

## 2022-03-07 PROCEDURE — 80048 BASIC METABOLIC PNL TOTAL CA: CPT

## 2022-03-07 PROCEDURE — 36415 COLL VENOUS BLD VENIPUNCTURE: CPT

## 2022-03-07 PROCEDURE — 85025 COMPLETE CBC W/AUTO DIFF WBC: CPT

## 2022-03-08 ENCOUNTER — SPECIALTY PHARMACY (OUTPATIENT)
Dept: PHARMACY | Facility: HOSPITAL | Age: 47
End: 2022-03-08

## 2022-03-08 NOTE — PROGRESS NOTES
[facility] lab review: Tasigna      3/7/2022   WBC 3.40 - 10.80 10*3/mm3 40.28 (A)   Hemoglobin 12.0 - 15.9 g/dL 9.8 (A)   Hematocrit 34.0 - 46.6 % 31.4 (A)   Platelets 140 - 450 10*3/mm3 210   Creatinine 0.57 - 1.00 mg/dL 0.67   BUN 6 - 20 mg/dL 10   Sodium 136 - 145 mmol/L 136   Potassium 3.5 - 5.2 mmol/L 4.1   Glucose 65 - 99 mg/dL 283 (A)   Calcium 8.6 - 10.5 mg/dL 9.3   Will have another ECG due to changing from Citalopram to Sertraline.  Black Tristan, PharmD BCPS

## 2022-03-21 ENCOUNTER — TELEPHONE (OUTPATIENT)
Dept: ENDOCRINOLOGY | Facility: CLINIC | Age: 47
End: 2022-03-21

## 2022-03-21 NOTE — TELEPHONE ENCOUNTER
Attempted to contact patient x2 to let know that our lab is closed and she will need to go to [facility] express labs and have drawn or let us know where to send the orders. Patient did not answer the phone on either call and there is no vm set up.   Lab appt is cx'ed.

## 2022-03-24 RX ORDER — CYCLOPENTOLATE HYDROCHLORIDE 10 MG/ML
SOLUTION/ DROPS OPHTHALMIC
COMMUNITY
Start: 2022-03-09 | End: 2022-05-09

## 2022-03-24 RX ORDER — METOPROLOL SUCCINATE 25 MG/1
25 TABLET, EXTENDED RELEASE ORAL DAILY
COMMUNITY
Start: 2022-03-01 | End: 2022-11-17 | Stop reason: HOSPADM

## 2022-03-24 RX ORDER — ACETAZOLAMIDE 250 MG/1
250 TABLET ORAL DAILY
Status: ON HOLD | COMMUNITY
Start: 2022-03-09 | End: 2022-06-30

## 2022-03-24 RX ORDER — MECLIZINE HYDROCHLORIDE 25 MG/1
TABLET ORAL
COMMUNITY
Start: 2022-02-28 | End: 2022-05-09

## 2022-03-25 NOTE — PROGRESS NOTES
Hematology/Oncology Outpatient Follow Up    PATIENT NAME:Nieves Mc  :1975  MRN: [id number]  PRIMARY CARE PHYSICIAN: Houston Oro MD  REFERRING PHYSICIAN: Houston Oro, *    Chief Complaint   Patient presents with   • Follow-up     Leukemia not having achieved remission, unspecified leukemia type (HCC)        HISTORY OF PRESENT ILLNESS:      Patient is a 46 y.o. female with PMH significant for CML on treatment with Imatinib.  Patient stated that she typically feels poorly with Imatinib with frequent nausea and vomiting.  Also complained of weakness and fatigue.  Patient presented to ED on 10/22/18 with complaints of an elevated blood sugar around 400.  Stated she felt poorly and has had a productive cough with yellow sputum.  Complained of nausea and stated she was unable to keep any food down anytime she ate.  The patient reported subjective fever without chills.  She stated she had been coughing so hard that she was vomiting.  She denied hematemesis.  Denied diarrhea, constipation or blood in her stool.       Patient’s chest x-ray showed no evidence of acute pulmonary embolus.  The patient has ground-glass opacities throughout the lungs.  There is some air trapping noted which would appear to be   infectious.  Patient was started on antibiotics.      Hem/Onc was consulted on 10/23/18 for co-management of patient with CML.  Patient was seen by Dr. Lerma on 10/12 on previous admission for patient reported CML on Imatinib (Gleevec).  Patient reports she is under the care of Dr. Roach at [facility] in [place].  Patient was seen by Dr. Lerma in May 2018 when patient presented with hematuria, which was attributed to her Imatinib.  Dr. Lerma followed during hospitalization but then patient returned to Dr. Roach for her usual follow up.  She was again seen on 10/12.  Patient is stating she will switch to Dr. Lerma.    · Review of Dr. Roach’s note:  On 18 patient had  BCR-abl which was 61.326.  Patient had been on Imatinib 400 mg daily.  She had presented in March 2018 with WBC of 24, hemoglobin 13.1, platelet count of 1,067,000.  Patient apparently had follow up BCR-abl in August 2018, results are not available for review.  Her bone marrow aspiration and biopsy was also performed at that time is currently not available for review.    · 11/6/18 - .  Uric acid 6.5.  BCR-abl by RT-PCR was measured at 3.36%.    · Due to thrombocytosis, patient was placed on Hydroxyurea 500 mg twice a day on 12/11/18.  Platelet count juana to 900,000.  Patient was noncompliant with CBCs that were recommended.    Patient was asked to return to the office after not being able to reach her.   · 12/27/18 - Bone marrow aspiration and biopsy was consistent with chronic myeloid leukemia.  BCR-abl positive, chronic phase.  ABL kinase mutational analysis is currently pending on the bone marrow.    · 1/3/19 - Repeat CBC, WBC 17, hemoglobin 11.9, platelet count 1,072,000.  Hydroxyurea was increased to 1 gm twice a day with follow up CBC.    · 1/6/19 - Follow up CBC showed progressive increase in platelet to 1.1 million.  Patient was offered admission to the hospital, which she declined.  Hydroxyurea was increased to 1 gm three   times a day as of 1/6/19.   · 1/7/19 - EKG shows sinus tachycardia.   milliseconds.   ·  ABL kinase on peripheral blood was ordered on 1/11/19.  Based on sequence analysis, there was no mutation detected.    · 2/12/19 - Patient was initiated on Tasigna 300 mg twice a day.  · 2/13/19 - Urinalysis was negative for hematuria.   · 2/22/19 -  milliseconds.  · 3/13/19 - EKG with QTC is 413 milliseconds.  Nonspecific changes noted.    · 4/18/19 - EKG showed QTC prolonged to 453 milliseconds.  This is changed from prior.  · 4/18/19 - Free T4 normal at 0.91.  Magnesium was 1.7.  BUN is 6.  Creatinine 0.7.  Bilirubin 2.2.  Phosphorous normal 3.7.  TSH 0.43, normal.  Free T3  was normal at 3.47.  Ionized calcium   normal at 1.23.  BCR-abl was 0.522%.    · 19 - EKG showed QTC of 441 milliseconds.  QT was 366.     · Patient has been lost to follow-up since 2019 for CML.  Patient states that she lost her insurance.  She also does not have any primary care physician at this time.  She is diabetic on insulin.  · Patient was recently admitted to the hospital for pneumonia due to COVID-19 infection.  At that time patient made a decision to become compliant with treatment.  She is currently on to Cigna 300 mg p.o. twice a day.  She is also on hydroxyurea 1 g twice a day.  Overall she feels better, she has more energy.  · 3/1/2022 white count is 53.92, hemoglobin 11.7 and platelets are 472  Past Medical History:   Diagnosis Date   • Bone pain    • Extremity pain     Carlin. legs pain   • Leg pain     left leg greater   • Migraine    • Pulmonary embolism (HCC)    • Vision loss     doing surgery       Past Surgical History:   Procedure Laterality Date   • BONE MARROW BIOPSY     • BREAST SURGERY     •  SECTION     • CHOLECYSTECTOMY     • EYE SURGERY      laser surgery due  to hemmorage--- 2021-- another surgery  lt eye11/15/21   • SPINE SURGERY      Lombardi spinal block   • TUBAL ABDOMINAL LIGATION           Current Outpatient Medications:   •  ALPRAZolam (Xanax) 0.25 MG tablet, Take 1 tablet by mouth At Night As Needed for Anxiety., Disp: 30 tablet, Rfl: 0  •  acetaZOLAMIDE (DIAMOX) 250 MG tablet, Take 250 mg by mouth Daily., Disp: , Rfl:   •  Continuous Blood Gluc Sensor (FreeStyle Zahira 2 Sensor) misc, USE 1 sensor AND replace every 2 WEEKS, Disp: , Rfl:   •  cyclopentolate (CYCLOGYL) 1 % ophthalmic solution, INSTILL 1 DROP INTO LEFT EYE TWICE A DAY, Disp: , Rfl:   •  gabapentin (NEURONTIN) 300 MG capsule, Take 1 capsule by mouth 3 (Three) Times a Day., Disp: 90 capsule, Rfl: 0  •  insulin NPH-insulin regular (humuLIN 70/30,novoLIN 70/30) (70-30) 100 UNIT/ML injection, Inject 40  Units under the skin into the appropriate area as directed 2 (Two) Times a Day With Meals., Disp: 30 mL, Rfl: 12  •  meclizine (ANTIVERT) 25 MG tablet, TAKE 1 TABLET BY MOUTH EVERY DAY as needed prior TO car ride, Disp: , Rfl:   •  metoprolol succinate XL (TOPROL-XL) 25 MG 24 hr tablet, TAKE 1 TABLET BY MOUTH daily FOR tachycardia, Disp: , Rfl:   •  ofloxacin (OCUFLOX) 0.3 % ophthalmic solution, PLACE 1 DROP IN LEFT EYE 4 TIMES A DAY STARTING THE DAY AFTER SURGERY, Disp: , Rfl:   •  ondansetron (ZOFRAN) 8 MG tablet, Take 1 tablet by mouth Every 8 (Eight) Hours As Needed for Nausea or Vomiting., Disp: 60 tablet, Rfl: 0  •  oxyCODONE-acetaminophen (PERCOCET)  MG per tablet, Take 1 tablet by mouth 2 (Two) Times a Day As Needed for Moderate Pain ., Disp: 60 tablet, Rfl: 0  •  potassium chloride (K-DUR,KLOR-CON) 20 MEQ CR tablet, Take 2 tablets by mouth 1 (One) Time for 1 dose., Disp: 2 tablet, Rfl: 0  •  prednisoLONE acetate (PRED FORTE) 1 % ophthalmic suspension, PLEASE SEE ATTACHED FOR DETAILED DIRECTIONS, Disp: , Rfl:   •  rivaroxaban (XARELTO) 20 MG tablet, Take 1 tablet by mouth Daily., Disp: 90 tablet, Rfl: 0    Allergies   Allergen Reactions   • Hydromorphone GI Intolerance     dilaudid   • Morphine Nausea And Vomiting   • Propofol Hives       Family History   Problem Relation Age of Onset   • Diabetes Mother    • Diabetes Maternal Grandmother    • Heart attack Maternal Grandmother    • Stroke Maternal Grandmother        Cancer-related family history is not on file.    Social History     Tobacco Use   • Smoking status: Never Smoker   • Smokeless tobacco: Never Used   Vaping Use   • Vaping Use: Never used   Substance Use Topics   • Alcohol use: No   • Drug use: No       HPI, ROS and PFSH have been reviewed and confirmed on 3/29/2022.     SUBJECTIVE:    Complain of a lot of anxiety and insomnia.  There are no suicidal or homicidal ideations    Patient recently had retinal surgery for retinal detachment    She  "is here today for follow-up.  Patient ran out of her Xanax.  Zoloft is not helping for her anxiety.  She also feels she is having side effects from the Zoloft.        REVIEW OF SYSTEMS:  Review of Systems   Constitutional: Negative for chills and fever.   HENT: Negative for ear pain, mouth sores, nosebleeds and sore throat.    Eyes: Negative for photophobia and visual disturbance.   Respiratory: Negative for wheezing and stridor.    Cardiovascular: Negative for chest pain and palpitations.   Gastrointestinal: Negative for abdominal pain, diarrhea, nausea and vomiting.   Endocrine: Negative for cold intolerance and heat intolerance.   Genitourinary: Negative for dysuria and hematuria.   Musculoskeletal: Negative for joint swelling and neck stiffness.   Skin: Negative for color change and rash.   Neurological: Negative for seizures and syncope.   Hematological: Negative for adenopathy.        No obvious bleeding   Psychiatric/Behavioral: Negative for agitation, confusion and hallucinations.       OBJECTIVE:    Vitals:    03/29/22 1606   BP: 115/74   Pulse: 96   Resp: 18   Temp: 96.9 °F (36.1 °C)   SpO2: 98%   Weight: 57 kg (125 lb 9.6 oz)   Height: 162.6 cm (64.02\")   PainSc:   5     Body mass index is 21.55 kg/m².    ECOG  (1) Restricted in physically strenuous activity, ambulatory and able to do work of light nature    Physical Exam  Vitals and nursing note reviewed.   Constitutional:       General: She is not in acute distress.     Appearance: She is not diaphoretic.   HENT:      Head: Normocephalic and atraumatic.   Eyes:      General: No scleral icterus.        Right eye: No discharge.         Left eye: No discharge.      Conjunctiva/sclera: Conjunctivae normal.   Neck:      Thyroid: No thyromegaly.   Cardiovascular:      Rate and Rhythm: Normal rate and regular rhythm.      Heart sounds: Normal heart sounds. No friction rub. No gallop.    Pulmonary:      Effort: Pulmonary effort is normal. No respiratory " distress.      Breath sounds: No stridor. No wheezing.   Abdominal:      General: Bowel sounds are normal.      Palpations: Abdomen is soft. There is no mass.      Tenderness: There is no abdominal tenderness. There is no guarding or rebound.   Musculoskeletal:         General: No tenderness. Normal range of motion.      Cervical back: Normal range of motion and neck supple.   Lymphadenopathy:      Cervical: No cervical adenopathy.   Skin:     General: Skin is warm.      Findings: No erythema or rash.   Neurological:      Mental Status: She is alert and oriented to person, place, and time.      Motor: No abnormal muscle tone.   Psychiatric:         Behavior: Behavior normal.     I have reexamined the patient and the results are consistent with the previously documented exam. Meghann Lerma MD       RECENT LABS  WBC   Date Value Ref Range Status   03/29/2022 129.92 (C) 3.40 - 10.80 10*3/mm3 Final     RBC   Date Value Ref Range Status   03/29/2022 3.70 (L) 3.77 - 5.28 10*6/mm3 Final     Hemoglobin   Date Value Ref Range Status   03/29/2022 10.1 (L) 12.0 - 15.9 g/dL Final     Hematocrit   Date Value Ref Range Status   03/29/2022 33.0 (L) 34.0 - 46.6 % Final     MCV   Date Value Ref Range Status   03/29/2022 89.2 79.0 - 97.0 fL Final     MCH   Date Value Ref Range Status   03/29/2022 27.3 26.6 - 33.0 pg Final     MCHC   Date Value Ref Range Status   03/29/2022 30.6 (L) 31.5 - 35.7 g/dL Final     RDW   Date Value Ref Range Status   03/29/2022 17.8 (H) 12.3 - 15.4 % Final     RDW-SD   Date Value Ref Range Status   03/29/2022 55.4 (H) 37.0 - 54.0 fl Final     MPV   Date Value Ref Range Status   03/29/2022 10.0 6.0 - 12.0 fL Final     Platelets   Date Value Ref Range Status   03/29/2022 380 140 - 450 10*3/mm3 Final     Neutrophil %   Date Value Ref Range Status   10/16/2019 60.0 42.7 - 76.0 % Final     Lymphocyte %   Date Value Ref Range Status   10/16/2019 32.1 19.6 - 45.3 % Final     Monocyte %   Date Value Ref  Range Status   01/28/2022 7.5 5.0 - 12.0 % Final     Eosinophil %   Date Value Ref Range Status   03/07/2022 2.9 0.3 - 6.2 % Final     Basophil %   Date Value Ref Range Status   10/16/2019 1.3 0.0 - 1.5 % Final     Neutrophils Absolute   Date Value Ref Range Status   01/17/2022 49.84 (H) 1.70 - 7.00 10*3/mm3 Final     Neutrophils, Absolute   Date Value Ref Range Status   10/16/2019 5.60 1.70 - 7.00 10*3/mm3 Final     Lymphocytes, Absolute   Date Value Ref Range Status   10/16/2019 3.00 0.70 - 3.10 10*3/mm3 Final     Monocytes, Absolute   Date Value Ref Range Status   01/28/2022 5.33 (H) 0.10 - 0.90 10*3/mm3 Final     Eosinophils, Absolute   Date Value Ref Range Status   03/07/2022 1.18 (H) 0.00 - 0.40 10*3/mm3 Final     Basophils Absolute   Date Value Ref Range Status   01/17/2022 14.82 (H) 0.00 - 0.20 10*3/mm3 Final     Basophils, Absolute   Date Value Ref Range Status   10/16/2019 0.10 0.00 - 0.20 10*3/mm3 Final     nRBC   Date Value Ref Range Status   01/17/2022 5.0 (H) 0.0 - 0.2 /100 WBC Final   10/16/2019 0.2 0.0 - 0.2 /100 WBC Final       Lab Results   Component Value Date    GLUCOSE 299 (H) 03/29/2022    BUN 8 03/29/2022    CREATININE 0.63 03/29/2022    EGFRIFNONA 133 02/02/2022    BCR 12.7 03/29/2022    K 3.3 (L) 03/29/2022    CO2 22.0 03/29/2022    CALCIUM 8.9 03/29/2022    ALBUMIN 4.00 02/02/2022    LABIL2 1.0 04/18/2019    AST 74 (H) 02/02/2022    ALT 51 (H) 02/02/2022           ASSESSMENT    ·  CML in chronic phase with no significant hematologic or molecular or cytogenetic response on   Imatinib.  ABL kinase mutational analysis was negative.  We  have represcribed to Tasigna 300 mg twice a day.  Patient has been noncompliant with treatments and ended up in the hospital with hyperleukocytosis, peripheral blood blasts.  Bone marrow biopsy suggest that she remains in chronic phase.  Continue to Tasiigna at the current doses.  Hydroxyurea has been discontinued.  Progressive leukocytosis noted on her CBC  today  · Uncontrolled diabetes type 1, followed at the [facility] by Dr. Calles  · Chronic anemia: Stable, multifactorial from CML, to Tasigna, hydroxyurea.  This has improved  · Insomnia  · Situational anxiety, not suicidal, Zoloft is not helping  · Hyperleukocytosis secondary to CML  · History of medical noncompliance  · Pulmonary embolism: Xarelto restarted  · Recent COVID-19 pneumonia  · History of prolonged QTC        Plans    · Continue close follow-ups  · Weaned off Elavil since anxiety not improved  · Discontinued Celexa 10 mg p.o. every morning due to prolonged QTC.  · Reviewed her EKG completed on 3/28/2022  · Continue Xanax 0.25 mg p.o. nightly as needed number 30 pills.  Will refill today.  · BCR ABL quantitative PCR  · She had dietary referral  · Check with pharmacist for appropriate antinausea medication for her but will not prolong QT interval on her TKI  · Continue Tasigna 300 mg p.o. twice a day  · Repeat CBC in 2 weeks  · Continue hydroxyurea 1 g twice a day for now  · Follow-up with me in 4 weeks or earlier as needed  · Discontinued hydroxyurea  · Monitor CBC closely  · Replace potassium today  · All questions answered       I spent 40 total minutes, face-to-face, caring for Hope today.  90% of this time involved counseling and/or coordination of care as documented within this note.         .

## 2022-03-28 DIAGNOSIS — C95.90 LEUKEMIA NOT HAVING ACHIEVED REMISSION, UNSPECIFIED LEUKEMIA TYPE: Primary | ICD-10-CM

## 2022-03-29 ENCOUNTER — TELEPHONE (OUTPATIENT)
Dept: ONCOLOGY | Facility: CLINIC | Age: 47
End: 2022-03-29

## 2022-03-29 ENCOUNTER — LAB (OUTPATIENT)
Dept: LAB | Facility: HOSPITAL | Age: 47
End: 2022-03-29

## 2022-03-29 ENCOUNTER — OFFICE VISIT (OUTPATIENT)
Dept: ONCOLOGY | Facility: CLINIC | Age: 47
End: 2022-03-29

## 2022-03-29 ENCOUNTER — APPOINTMENT (OUTPATIENT)
Dept: LAB | Facility: HOSPITAL | Age: 47
End: 2022-03-29

## 2022-03-29 VITALS
HEART RATE: 96 BPM | RESPIRATION RATE: 18 BRPM | SYSTOLIC BLOOD PRESSURE: 115 MMHG | TEMPERATURE: 96.9 F | HEIGHT: 64 IN | BODY MASS INDEX: 21.44 KG/M2 | OXYGEN SATURATION: 98 % | DIASTOLIC BLOOD PRESSURE: 74 MMHG | WEIGHT: 125.6 LBS

## 2022-03-29 DIAGNOSIS — C95.90 LEUKEMIA NOT HAVING ACHIEVED REMISSION, UNSPECIFIED LEUKEMIA TYPE: Primary | ICD-10-CM

## 2022-03-29 DIAGNOSIS — Z01.818 EXAMINATION PRIOR TO CHEMOTHERAPY: ICD-10-CM

## 2022-03-29 DIAGNOSIS — C91.10 CLL (CHRONIC LYMPHOCYTIC LEUKEMIA): Primary | ICD-10-CM

## 2022-03-29 DIAGNOSIS — C95.90 LEUKEMIA NOT HAVING ACHIEVED REMISSION, UNSPECIFIED LEUKEMIA TYPE: ICD-10-CM

## 2022-03-29 DIAGNOSIS — C92.10 CML (CHRONIC MYELOCYTIC LEUKEMIA): ICD-10-CM

## 2022-03-29 LAB
ANION GAP SERPL CALCULATED.3IONS-SCNC: 12 MMOL/L (ref 5–15)
BASOPHILS NFR BLD AUTO: ABNORMAL %
BUN SERPL-MCNC: 8 MG/DL (ref 6–20)
BUN/CREAT SERPL: 12.7 (ref 7–25)
CALCIUM SPEC-SCNC: 8.9 MG/DL (ref 8.6–10.5)
CHLORIDE SERPL-SCNC: 102 MMOL/L (ref 98–107)
CO2 SERPL-SCNC: 22 MMOL/L (ref 22–29)
CREAT SERPL-MCNC: 0.63 MG/DL (ref 0.57–1)
DEPRECATED RDW RBC AUTO: 55.4 FL (ref 37–54)
EGFRCR SERPLBLD CKD-EPI 2021: 111 ML/MIN/1.73
EOSINOPHIL NFR BLD AUTO: ABNORMAL %
ERYTHROCYTE [DISTWIDTH] IN BLOOD BY AUTOMATED COUNT: 17.8 % (ref 12.3–15.4)
GLUCOSE SERPL-MCNC: 299 MG/DL (ref 65–99)
HCT VFR BLD AUTO: 33 % (ref 34–46.6)
HGB BLD-MCNC: 10.1 G/DL (ref 12–15.9)
HOLD SPECIMEN: NORMAL
LYMPHOCYTES NFR BLD AUTO: ABNORMAL %
MCH RBC QN AUTO: 27.3 PG (ref 26.6–33)
MCHC RBC AUTO-ENTMCNC: 30.6 G/DL (ref 31.5–35.7)
MCV RBC AUTO: 89.2 FL (ref 79–97)
MONOCYTES NFR BLD AUTO: ABNORMAL %
NEUTROPHILS NFR BLD AUTO: ABNORMAL %
PLATELET # BLD AUTO: 380 10*3/MM3 (ref 140–450)
PMV BLD AUTO: 10 FL (ref 6–12)
POTASSIUM SERPL-SCNC: 3.3 MMOL/L (ref 3.5–5.2)
RBC # BLD AUTO: 3.7 10*6/MM3 (ref 3.77–5.28)
SODIUM SERPL-SCNC: 136 MMOL/L (ref 136–145)
WBC NRBC COR # BLD: 129.92 10*3/MM3 (ref 3.4–10.8)

## 2022-03-29 PROCEDURE — 99215 OFFICE O/P EST HI 40 MIN: CPT | Performed by: INTERNAL MEDICINE

## 2022-03-29 PROCEDURE — 36415 COLL VENOUS BLD VENIPUNCTURE: CPT

## 2022-03-29 PROCEDURE — 85025 COMPLETE CBC W/AUTO DIFF WBC: CPT

## 2022-03-29 PROCEDURE — 80048 BASIC METABOLIC PNL TOTAL CA: CPT

## 2022-03-29 RX ORDER — POTASSIUM CHLORIDE 20 MEQ/1
40 TABLET, EXTENDED RELEASE ORAL ONCE
Qty: 2 TABLET | Refills: 0 | Status: SHIPPED | OUTPATIENT
Start: 2022-03-29 | End: 2022-03-29

## 2022-03-29 RX ORDER — ALPRAZOLAM 0.25 MG/1
0.25 TABLET ORAL NIGHTLY PRN
Qty: 30 TABLET | Refills: 0 | Status: SHIPPED | OUTPATIENT
Start: 2022-03-29 | End: 2022-05-05 | Stop reason: SDUPTHER

## 2022-03-29 NOTE — TELEPHONE ENCOUNTER
Caller: SAMUEL    Relationship to patient: MOTHER    Best call back number: [phone]    Patient is needing: TO REQUEST CALL FROM RN.

## 2022-03-29 NOTE — TELEPHONE ENCOUNTER
PATIENT SEEN IN F/U TODAY.  PATIENT'S MOTHER STATES SHE WILL CALL BACK TO SCHEDULE NEXT MD F/U APPT.

## 2022-03-30 RX ORDER — CITALOPRAM 10 MG/1
10 TABLET ORAL DAILY
Qty: 30 TABLET | Refills: 1 | Status: SHIPPED | OUTPATIENT
Start: 2022-03-30 | End: 2022-07-18

## 2022-03-30 NOTE — TELEPHONE ENCOUNTER
Rx Refill Note  Requested Prescriptions     Pending Prescriptions Disp Refills   • citalopram (CeleXA) 10 MG tablet [Pharmacy Med Name: CITALOPRAM HBR 10 MG TABLET] 30 tablet 1     Sig: Take 1 tablet by mouth Daily. To begin 1/31/22      Last office visit with prescribing clinician: 3/1/2022      Next office visit with prescribing clinician: 3/29/2022            Nisha Comer MA  03/30/22, 16:13 EDT

## 2022-03-31 ENCOUNTER — SPECIALTY PHARMACY (OUTPATIENT)
Dept: PHARMACY | Facility: HOSPITAL | Age: 47
End: 2022-03-31

## 2022-03-31 DIAGNOSIS — E87.6 HYPOKALEMIA: Primary | ICD-10-CM

## 2022-03-31 NOTE — PROGRESS NOTES
Specialty Pharmacy Note:  Tasigna    Hydrea has been discontinued.  ODp=833 on 3/28.  Labs as follow:        3/29/2022   WBC 3.40 - 10.80 10*3/mm3 129.92 (A)   Hemoglobin 12.0 - 15.9 g/dL 10.1 (A)   Hematocrit 34.0 - 46.6 % 33.0 (A)   Platelets 140 - 450 10*3/mm3 380   Creatinine 0.57 - 1.00 mg/dL 0.63   BUN 6 - 20 mg/dL 8   Sodium 136 - 145 mmol/L 136   Potassium 3.5 - 5.2 mmol/L 3.3 (A)   Glucose 65 - 99 mg/dL 299 (A)   Calcium 8.6 - 10.5 mg/dL 8.9   Recheck potassium in one week per Alda SANTOS NP.    Thanks,    Ros Martinez, PharmD

## 2022-04-07 ENCOUNTER — TELEPHONE (OUTPATIENT)
Dept: PAIN MEDICINE | Facility: CLINIC | Age: 47
End: 2022-04-07

## 2022-04-07 DIAGNOSIS — G90.522 COMPLEX REGIONAL PAIN SYNDROME TYPE 1 OF LEFT LOWER EXTREMITY: ICD-10-CM

## 2022-04-07 RX ORDER — OXYCODONE AND ACETAMINOPHEN 10; 325 MG/1; MG/1
1 TABLET ORAL 2 TIMES DAILY PRN
Qty: 60 TABLET | Refills: 0 | Status: ON HOLD | OUTPATIENT
Start: 2022-04-07 | End: 2022-06-02

## 2022-04-07 NOTE — TELEPHONE ENCOUNTER
Caller: PATIENT 'S MOM SAMUEL      Relationship to patient: MOTHER     Best call back number: [phone]      Chief complaint:     Type of visit: FOLLOW UP AND MED REFILLS     Requested date: 04/11/22 AFTER 12 NOON     Additional notes:PT'S MOTHER CALLING.   STATES THAT PT. NEEDS TO SCHEDULE A FOLLOW UP APPT. WITH DR. COBB AND MEDICATION FOLLOW UP.   THERE ARE NO OPENINGS UNTIL 04/29/22  SHE STATES THAT SHE HAS TO TAKE OFF WORK TO BRING PT. AND WILL BE OFF ON 04/11/22 FOR OTHER APPTS.   ASKING IF DR. STEVENS CAN SEE HER THEN.   PLEASE CALL TO [facility]VISE.

## 2022-04-07 NOTE — TELEPHONE ENCOUNTER
Rx Refill Note  Requested Prescriptions     Pending Prescriptions Disp Refills   • oxyCODONE-acetaminophen (PERCOCET)  MG per tablet 60 tablet 0     Sig: Take 1 tablet by mouth 2 (Two) Times a Day As Needed for Moderate Pain .      Last office visit with prescribing clinician: 2/14/2022      Next office visit with prescribing clinician: Visit date not found            Valerie Valderrama MA  04/07/22, 11:15 EDT

## 2022-04-07 NOTE — TELEPHONE ENCOUNTER
Detail Level: Zone RESCHEDULED TO 4/18 WAS A NO SHOW IN MARCH WANTS TO KNOW IF SHE CAN GET A REFILL    Detail Level: Detailed Detail Level: Generalized

## 2022-05-03 ENCOUNTER — TELEPHONE (OUTPATIENT)
Dept: PAIN MEDICINE | Facility: CLINIC | Age: 47
End: 2022-05-03

## 2022-05-03 NOTE — TELEPHONE ENCOUNTER
Caller: HOPE   Relationship to Patient: SELF     Phone Number: [phone]  Reason for Call: PATIENT CALLED STATING ITS GETTING HARD TO WALK AGAIN AND ASKING IF DR COBB CAN UP HER PRESCRIPTION

## 2022-05-03 NOTE — TELEPHONE ENCOUNTER
She's already no-showed 2 appts and I've continued to refill her meds.  I won't send her anything else unless she is seen.  Her last glucose reading was >500 so that also needs to be corrected.

## 2022-05-04 DIAGNOSIS — C95.90 LEUKEMIA NOT HAVING ACHIEVED REMISSION, UNSPECIFIED LEUKEMIA TYPE: ICD-10-CM

## 2022-05-04 DIAGNOSIS — C92.10 CML (CHRONIC MYELOCYTIC LEUKEMIA): ICD-10-CM

## 2022-05-04 RX ORDER — ALPRAZOLAM 0.25 MG/1
0.25 TABLET ORAL NIGHTLY PRN
Qty: 30 TABLET | Refills: 0 | Status: CANCELLED | OUTPATIENT
Start: 2022-05-04

## 2022-05-04 NOTE — TELEPHONE ENCOUNTER
Rx Refill Note  Requested Prescriptions     Pending Prescriptions Disp Refills   • ALPRAZolam (Xanax) 0.25 MG tablet 30 tablet 0     Sig: Take 1 tablet by mouth At Night As Needed for Anxiety.      Last office visit with prescribing clinician: 3/29/2022      Next office visit with prescribing clinician: Visit date not found            Vanessa Swartz RN  05/04/22, 17:11 EDT

## 2022-05-04 NOTE — TELEPHONE ENCOUNTER
5/4/22--  called the above #  they had me call her at [phone]--  she would like to come in sooner than 6/6/22

## 2022-05-04 NOTE — TELEPHONE ENCOUNTER
Received a message from the pt stating that she needs a refill of Xanax. I called and spoke the pt's mother and let her know I was sending the prescription to the physician for signature. She verbalized understanding.

## 2022-05-05 ENCOUNTER — TELEPHONE (OUTPATIENT)
Dept: ONCOLOGY | Facility: HOSPITAL | Age: 47
End: 2022-05-05

## 2022-05-05 DIAGNOSIS — C95.90 LEUKEMIA NOT HAVING ACHIEVED REMISSION, UNSPECIFIED LEUKEMIA TYPE: ICD-10-CM

## 2022-05-05 DIAGNOSIS — C92.10 CML (CHRONIC MYELOCYTIC LEUKEMIA): ICD-10-CM

## 2022-05-05 RX ORDER — ALPRAZOLAM 0.25 MG/1
0.25 TABLET ORAL NIGHTLY PRN
Qty: 30 TABLET | Refills: 0 | Status: SHIPPED | OUTPATIENT
Start: 2022-05-05 | End: 2022-06-24 | Stop reason: SDUPTHER

## 2022-05-09 ENCOUNTER — OFFICE VISIT (OUTPATIENT)
Dept: PAIN MEDICINE | Facility: CLINIC | Age: 47
End: 2022-05-09

## 2022-05-09 VITALS
WEIGHT: 125 LBS | RESPIRATION RATE: 16 BRPM | DIASTOLIC BLOOD PRESSURE: 83 MMHG | OXYGEN SATURATION: 97 % | HEART RATE: 122 BPM | BODY MASS INDEX: 21.44 KG/M2 | SYSTOLIC BLOOD PRESSURE: 131 MMHG

## 2022-05-09 DIAGNOSIS — G90.522 COMPLEX REGIONAL PAIN SYNDROME TYPE 1 OF LEFT LOWER EXTREMITY: ICD-10-CM

## 2022-05-09 PROCEDURE — 99214 OFFICE O/P EST MOD 30 MIN: CPT | Performed by: STUDENT IN AN ORGANIZED HEALTH CARE EDUCATION/TRAINING PROGRAM

## 2022-05-09 RX ORDER — GABAPENTIN 300 MG/1
300 CAPSULE ORAL 3 TIMES DAILY
Qty: 90 CAPSULE | Refills: 0 | Status: SHIPPED | OUTPATIENT
Start: 2022-05-09 | End: 2022-07-28

## 2022-05-09 RX ORDER — TRAZODONE HYDROCHLORIDE 50 MG/1
50 TABLET ORAL NIGHTLY
COMMUNITY
Start: 2022-03-24 | End: 2022-09-01 | Stop reason: SDUPTHER

## 2022-05-09 RX ORDER — OXYCODONE AND ACETAMINOPHEN 10; 325 MG/1; MG/1
1 TABLET ORAL EVERY 8 HOURS PRN
Qty: 90 TABLET | Refills: 0 | Status: SHIPPED | OUTPATIENT
Start: 2022-05-09 | End: 2022-06-16 | Stop reason: SDUPTHER

## 2022-05-09 NOTE — PROGRESS NOTES
CHIEF COMPLAINT  Chief Complaint   Patient presents with   • Pain     Eval for trouble with walking with bottom of feet pain treated w/oxycodone 5/9 @ 2 am        Primary Care  Houston Oro MD    Fracisco Mc is a 46 y.o. female  who presents for cancer-related pain and left lower extremity allodynia.  She states that she is having progressively worsening pain for many years following the diagnosis of CML.  She has been previously treated with over-the-counter medications which are no longer effective.  She states in the past, she had taken some hydrocodone which was also not effective.  She is unable to tolerate gabapentin due to side effects.  In addition to describing generalized leg and body aches from her cancer, she also describes allodynia-like symptoms in the left leg.  She states is difficult for her to wear shoes due to significant pain in the left foot.  She also describes pain with light touch of the left lower extremity.  She endorses swelling in the left lower extremity as well as changing of temperature left lower extremity.  She also has significant trouble walking and some weakness    Leg Pain   Associated symptoms include numbness.   Extremity Pain   Associated symptoms include numbness.   Pain  Associated symptoms include arthralgias, fatigue, numbness and weakness.        Location: Whole body, left lower extremity  Onset: Years ago  Duration: Progressively worsening  Timing: Constant throughout the day  Quality: Aching sharp pains in the left lower extremity  Severity: Today: 6       Last Week: 6       Worst: 10  Modifying Factors: The pain is worse with any type of movement and physical activity.  Pain is also exacerbated by light touch of the left lower extremity    Physical Therapy: no    Interval Update 05/09/2022: Complaining of worsening pain.  States she has been overusing her medicine and has been taking twice as much as prescribed.  States her sympathetic blocks  are no longer effective.    The following portions of the patient's history were reviewed and updated as appropriate: allergies, current medications, past family history, past medical history, past social history, past surgical history and problem list.      Current Outpatient Medications:   •  acetaZOLAMIDE (DIAMOX) 250 MG tablet, Take 250 mg by mouth Daily., Disp: , Rfl:   •  ALPRAZolam (Xanax) 0.25 MG tablet, Take 1 tablet by mouth At Night As Needed for Anxiety., Disp: 30 tablet, Rfl: 0  •  citalopram (CeleXA) 10 MG tablet, Take 1 tablet by mouth Daily. To begin 1/31/22, Disp: 30 tablet, Rfl: 1  •  Continuous Blood Gluc Sensor (FreeStyle Zahira 2 Sensor) misc, USE 1 sensor AND replace every 2 WEEKS, Disp: , Rfl:   •  gabapentin (NEURONTIN) 300 MG capsule, Take 1 capsule by mouth 3 (Three) Times a Day., Disp: 90 capsule, Rfl: 0  •  insulin NPH-insulin regular (humuLIN 70/30,novoLIN 70/30) (70-30) 100 UNIT/ML injection, Inject 40 Units under the skin into the appropriate area as directed 2 (Two) Times a Day With Meals., Disp: 30 mL, Rfl: 12  •  metoprolol succinate XL (TOPROL-XL) 25 MG 24 hr tablet, TAKE 1 TABLET BY MOUTH daily FOR tachycardia, Disp: , Rfl:   •  oxyCODONE-acetaminophen (PERCOCET)  MG per tablet, Take 1 tablet by mouth 2 (Two) Times a Day As Needed for Moderate Pain ., Disp: 60 tablet, Rfl: 0  •  rivaroxaban (XARELTO) 20 MG tablet, Take 1 tablet by mouth Daily., Disp: 90 tablet, Rfl: 0  •  traZODone (DESYREL) 50 MG tablet, TAKE 1 OR 2 TABLETS BY MOUTH EVERY NIGHT AT BEDTIME, Disp: , Rfl:   •  oxyCODONE-acetaminophen (PERCOCET)  MG per tablet, Take 1 tablet by mouth Every 8 (Eight) Hours As Needed for Moderate Pain ., Disp: 90 tablet, Rfl: 0    Review of Systems   Constitutional: Positive for fatigue.   Musculoskeletal: Positive for arthralgias and gait problem.   Neurological: Positive for weakness and numbness.       Vitals:    05/09/22 1114   BP: 131/83   Pulse: (!) 122   Resp: 16    SpO2: 97%   Weight: 56.7 kg (125 lb)   PainSc:   8       Urine Drug Screen: 6/7/2021  Appropriate: Yes    Objective   Physical Exam  Vitals and nursing note reviewed.   Constitutional:       General: She is not in acute distress.     Appearance: Normal appearance. She is normal weight.   Musculoskeletal:         General: Swelling and tenderness present.      Comments: Left lower extremity:  1.  Diffusely tender to even light palpation with signs of allodynia in the left leg and left foot  2.  Swelling present in the left foot compared to the right foot  3.  Slight decreased temperature in the left leg compared to the right leg   Skin:     General: Skin is warm and dry.   Neurological:      Mental Status: She is alert.           Assessment/Plan   Problems Addressed this Visit    None     Visit Diagnoses     Complex regional pain syndrome type 1 of left lower extremity        Relevant Medications    oxyCODONE-acetaminophen (PERCOCET)  MG per tablet    Other Relevant Orders    Lumbar Sympathetic Block      Diagnoses       Codes Comments    Complex regional pain syndrome type 1 of left lower extremity     ICD-10-CM: G90.522  ICD-9-CM: 337.22           Plan:  1. Refill oxycodone and increase to 3 times daily per patient request  2. MRI essentially unremarkable  3. As she is having a return of CRPS type symptoms we will plan for repeat left-sided lumbar sympathetic block  4. If no significant relief, can plan for spinal cord stimulation  --- Follow-up next available for left-sided lumbar sympathetic block           INSPECT REPORT    As part of the patient's treatment plan, I may be prescribing controlled substances. The patient has been made aware of appropriate use of such medications, including potential risk of somnolence, limited ability to drive and/or work safely, and the potential for dependence or overdose. It has also bee made clear that these medications are for use by this patient only, without  concomitant use of alcohol or other substances unless prescribed.     Patient has completed prescribing agreement detailing terms of continued prescribing of controlled substances, including monitoring SMITH reports, urine drug screening, and pill counts if necessary. The patient is aware that inappropriate use will results in cessation of prescribing such medications.    INSPECT report has been reviewed and scanned into the patient's chart.    As the clinician, I personally reviewed the INSPECT from 5/6/2022.    History and physical exam exhibit continued safe and appropriate use of controlled substances.      EMR Dragon/Transcription disclaimer:   Much of this encounter note is an electronic transcription/translation of spoken language to printed text. The electronic translation of spoken language may permit erroneous, or at times, nonsensical words or phrases to be inadvertently transcribed; Although I have reviewed the note for such errors, some may still exist.

## 2022-05-16 ENCOUNTER — TELEPHONE (OUTPATIENT)
Dept: ONCOLOGY | Facility: CLINIC | Age: 47
End: 2022-05-16

## 2022-05-18 ENCOUNTER — TELEPHONE (OUTPATIENT)
Dept: PAIN MEDICINE | Facility: CLINIC | Age: 47
End: 2022-05-18

## 2022-05-18 ENCOUNTER — HOSPITAL ENCOUNTER (OUTPATIENT)
Dept: PAIN MEDICINE | Facility: HOSPITAL | Age: 47
Discharge: HOME | End: 2022-05-18

## 2022-05-18 VITALS
RESPIRATION RATE: 16 BRPM | DIASTOLIC BLOOD PRESSURE: 84 MMHG | SYSTOLIC BLOOD PRESSURE: 125 MMHG | OXYGEN SATURATION: 99 % | TEMPERATURE: 98 F | BODY MASS INDEX: 21.34 KG/M2 | WEIGHT: 125 LBS | HEIGHT: 64 IN | HEART RATE: 109 BPM

## 2022-05-18 DIAGNOSIS — G90.522 COMPLEX REGIONAL PAIN SYNDROME TYPE 1 OF LEFT LOWER EXTREMITY: ICD-10-CM

## 2022-05-18 DIAGNOSIS — R52 PAIN: ICD-10-CM

## 2022-05-18 PROCEDURE — 64520 N BLOCK LUMBAR/THORACIC: CPT | Performed by: STUDENT IN AN ORGANIZED HEALTH CARE EDUCATION/TRAINING PROGRAM

## 2022-05-18 PROCEDURE — 0 IOPAMIDOL 41 % SOLUTION: Performed by: STUDENT IN AN ORGANIZED HEALTH CARE EDUCATION/TRAINING PROGRAM

## 2022-05-18 PROCEDURE — 25010000002 CLONIDINE PER 1 MG

## 2022-05-18 PROCEDURE — 77003 FLUOROGUIDE FOR SPINE INJECT: CPT

## 2022-05-18 PROCEDURE — 25010000002 DEXAMETHASONE SODIUM PHOSPHATE 10 MG/ML SOLUTION: Performed by: STUDENT IN AN ORGANIZED HEALTH CARE EDUCATION/TRAINING PROGRAM

## 2022-05-18 RX ORDER — DEXAMETHASONE SODIUM PHOSPHATE 10 MG/ML
10 INJECTION, SOLUTION INTRAMUSCULAR; INTRAVENOUS ONCE
Status: COMPLETED | OUTPATIENT
Start: 2022-05-18 | End: 2022-05-18

## 2022-05-18 RX ORDER — CLONIDINE 100 UG/ML
100 INJECTION, SOLUTION EPIDURAL ONCE
Status: DISCONTINUED | OUTPATIENT
Start: 2022-05-18 | End: 2022-05-19 | Stop reason: HOSPADM

## 2022-05-18 RX ORDER — MIDAZOLAM HYDROCHLORIDE 1 MG/ML
2 INJECTION INTRAMUSCULAR; INTRAVENOUS ONCE
Status: DISCONTINUED | OUTPATIENT
Start: 2022-05-18 | End: 2022-05-19 | Stop reason: HOSPADM

## 2022-05-18 RX ORDER — FENTANYL CITRATE 50 UG/ML
100 INJECTION, SOLUTION INTRAMUSCULAR; INTRAVENOUS
Status: DISCONTINUED | OUTPATIENT
Start: 2022-05-18 | End: 2022-05-19 | Stop reason: HOSPADM

## 2022-05-18 RX ORDER — BUPIVACAINE HYDROCHLORIDE 2.5 MG/ML
10 INJECTION, SOLUTION EPIDURAL; INFILTRATION; INTRACAUDAL ONCE
Status: COMPLETED | OUTPATIENT
Start: 2022-05-18 | End: 2022-05-18

## 2022-05-18 RX ORDER — CLONIDINE 100 UG/ML
INJECTION, SOLUTION EPIDURAL
Status: COMPLETED
Start: 2022-05-18 | End: 2022-05-18

## 2022-05-18 RX ADMIN — CLONIDINE 100 MCG: 100 INJECTION, SOLUTION EPIDURAL at 10:06

## 2022-05-18 RX ADMIN — BUPIVACAINE HYDROCHLORIDE 10 ML: 2.5 INJECTION, SOLUTION EPIDURAL; INFILTRATION; INTRACAUDAL; PERINEURAL at 10:06

## 2022-05-18 RX ADMIN — DEXAMETHASONE SODIUM PHOSPHATE 10 MG: 10 INJECTION, SOLUTION INTRAMUSCULAR; INTRAVENOUS at 10:05

## 2022-05-18 RX ADMIN — IOPAMIDOL 1 ML: 408 INJECTION, SOLUTION INTRATHECAL at 10:05

## 2022-05-18 NOTE — PROCEDURES
Lumbar Sympathetic Blockade - left  [facility]    PREOPERATIVE DIAGNOSIS:    CRPS Type I of the  Left Lower Extremity    POSTOPERATIVE DIAGNOSIS: Same as preoperative dx    PROCEDURE:  Lumbar Sympathetic Block, left, with fluoroscopy                             Needle entry at: L3,  • CPT 16811, Lumbar Sympathetic Blockade    PRE-PROCEDURE DISCUSSION WITH PATIENT:    Risks and complications were discussed with the patient prior to starting the procedure and informed consent was obtained.   We discussed various topics including but not limited to bleeding, infection, injury, nerve injury, paralysis, coma, death, postprocedural painful flare-up, temporary entire leg weakness, numbness, temperature change, postprocedural site soreness, and a lack of pain relief.  We discussed the diagnostic aspect of lumbar sympathetic nerve blockade.    SURGEON:  Kishore Chatterjee MD    REASON FOR PROCEDURE:    CRPS of the left lower extremity     SEDATION:  Patient declined administration of moderate sedation      LOCAL ANESTHETIC: Marcaine 0.25%  STEROID:   Dexamethasone 10mg   TOTAL VOLUME OF SOLUTION:  10 ml    DESCRIPTON OF PROCEDURE:    After obtaining informed consent, I.V. was started in the preop area.   The patient was taken to the operating room and placed in the prone position. EKG, blood pressure, and pulse oximeter were monitored throughout, and sedation was provided as needed by the RN under my guidance. All pressure points were well padded.      Starting in the oblique view toward the appropriate side, the transverse process of the appropriate level was identified. At the injection level, the tip of the transverse process was overlying the anterolateral margin of the vertebral body. The skin and subcutaneous tissue was anesthetized with 1% lidocaine just cephalad and anterolateral to the verebral body. A 22-gauge spinal needle was introduced cephalad to the transverse process and under fluorocopic guidance  with serial images every 1 cm until the needle tip gently contacted the vertebral body. Under lateral view, the needle was gently advanced anteriorly until the tip was over the anteiror one-third of the vertebral body. Needle position was verified in the AP fluoroscopic view with the needle tip lying medial to the lateral margin of the vertebral body. Aspiration was verified to be negative. 1 ml of omnipaque was injected under real time fluoroscopy, in both the lateral and AP demension, to verify lack of vascular uptake and appropriate spread cephalad and caudal. After appropriate spread was confirmed, the local anesthetic solution with steroid was injected with aspiration every couple ml. and and including 1cc of IsoVue The needle was then removed intact.  Vital signs remained stable throughout.      ESTIMATED BLOOD LOSS:  <5 mL  SPECIMENS:  None    COMPLICATIONS: No complications were noted., There was no indication of vascular uptake on live injection of contrast dye., There was no indication of intrathecal uptake on live injection of contrast dye. and There was not any evidence of dural puncture.      TOLERANCE & DISCHARGE CONDITION:    The patient tolerated the procedure well.  The patient was transported to the recovery area without difficulties.  The patient was monitored for at least 30 minutes after the procedure, and temperature increase in the ipsilateral upper extremity was noted.  The patient was discharged to home under the care of family in stable and satisfactory condition.    PLAN OF CARE:  1. The patient was given our standard instruction sheet.  2. The patient will Return to clinic 4-6 wks.    3. The patient will resume all medications as per the medication reconciliation sheet.

## 2022-05-18 NOTE — TELEPHONE ENCOUNTER
Hub staff attempted to follow warm transfer process and was unsuccessful     Caller: EDY    Relationship to patient: DAUGHTER     Best call back number: [phone]    Patient is needing: PT'S MOTHER IS HAVING PROCEDURE AT HOSPITAL. SHE CALLED TO LET THEM KNOW SHE IS OUT FRONT. ATTEMPTED TO WARM TRANSFER TO  FOR DIRECTION.

## 2022-05-18 NOTE — ADDENDUM NOTE
Encounter addended by: Kishore Chatterjee MD on: 5/18/2022 10:55 AM   Actions taken: Order list changed

## 2022-05-19 ENCOUNTER — TELEPHONE (OUTPATIENT)
Dept: PAIN MEDICINE | Facility: HOSPITAL | Age: 47
End: 2022-05-19

## 2022-06-01 ENCOUNTER — LAB (OUTPATIENT)
Dept: LAB | Facility: HOSPITAL | Age: 47
End: 2022-06-01

## 2022-06-01 ENCOUNTER — SPECIALTY PHARMACY (OUTPATIENT)
Dept: PHARMACY | Facility: HOSPITAL | Age: 47
End: 2022-06-01

## 2022-06-01 ENCOUNTER — APPOINTMENT (OUTPATIENT)
Dept: GENERAL RADIOLOGY | Facility: HOSPITAL | Age: 47
End: 2022-06-01

## 2022-06-01 ENCOUNTER — APPOINTMENT (OUTPATIENT)
Dept: CT IMAGING | Facility: HOSPITAL | Age: 47
End: 2022-06-01

## 2022-06-01 ENCOUNTER — OFFICE VISIT (OUTPATIENT)
Dept: ONCOLOGY | Facility: CLINIC | Age: 47
End: 2022-06-01

## 2022-06-01 ENCOUNTER — HOSPITAL ENCOUNTER (INPATIENT)
Facility: HOSPITAL | Age: 47
LOS: 7 days | Discharge: HOME | End: 2022-06-10
Attending: EMERGENCY MEDICINE | Admitting: INTERNAL MEDICINE

## 2022-06-01 ENCOUNTER — TELEPHONE (OUTPATIENT)
Dept: ONCOLOGY | Facility: HOSPITAL | Age: 47
End: 2022-06-01

## 2022-06-01 ENCOUNTER — APPOINTMENT (OUTPATIENT)
Dept: PHARMACY | Facility: HOSPITAL | Age: 47
End: 2022-06-01

## 2022-06-01 VITALS
HEIGHT: 64 IN | DIASTOLIC BLOOD PRESSURE: 80 MMHG | OXYGEN SATURATION: 97 % | WEIGHT: 125 LBS | HEART RATE: 119 BPM | RESPIRATION RATE: 18 BRPM | SYSTOLIC BLOOD PRESSURE: 144 MMHG | TEMPERATURE: 97.1 F | BODY MASS INDEX: 21.34 KG/M2

## 2022-06-01 DIAGNOSIS — C95.90 LEUKEMIA NOT HAVING ACHIEVED REMISSION, UNSPECIFIED LEUKEMIA TYPE: Primary | ICD-10-CM

## 2022-06-01 DIAGNOSIS — C92.10 CML (CHRONIC MYELOCYTIC LEUKEMIA): ICD-10-CM

## 2022-06-01 DIAGNOSIS — J18.9 ATYPICAL PNEUMONIA: ICD-10-CM

## 2022-06-01 DIAGNOSIS — C92.10 CHRONIC MYELOID LEUKEMIA: Primary | ICD-10-CM

## 2022-06-01 DIAGNOSIS — C95.90 LEUKEMIA NOT HAVING ACHIEVED REMISSION, UNSPECIFIED LEUKEMIA TYPE: ICD-10-CM

## 2022-06-01 DIAGNOSIS — R10.84 GENERALIZED ABDOMINAL PAIN: ICD-10-CM

## 2022-06-01 DIAGNOSIS — R16.1 SPLENOMEGALY: ICD-10-CM

## 2022-06-01 DIAGNOSIS — E10.649 UNCONTROLLED TYPE 1 DIABETES MELLITUS WITH HYPOGLYCEMIA, UNSPECIFIED HYPOGLYCEMIA COMA STATUS: ICD-10-CM

## 2022-06-01 DIAGNOSIS — C92.10 CML (CHRONIC MYELOID LEUKEMIA) WITH FAILED REMISSION: Primary | ICD-10-CM

## 2022-06-01 DIAGNOSIS — D64.9 ANEMIA, UNSPECIFIED TYPE: ICD-10-CM

## 2022-06-01 LAB
ABO GROUP BLD: NORMAL
ALBUMIN SERPL-MCNC: 3.2 G/DL (ref 3.5–5.2)
ALBUMIN/GLOB SERPL: 1 G/DL
ALP SERPL-CCNC: 949 U/L (ref 39–117)
ALT SERPL W P-5'-P-CCNC: <5 U/L (ref 1–33)
ANION GAP SERPL CALCULATED.3IONS-SCNC: 11 MMOL/L (ref 5–15)
ANION GAP SERPL CALCULATED.3IONS-SCNC: 13 MMOL/L (ref 5–15)
ANISOCYTOSIS BLD QL: ABNORMAL
ANTI-E: NORMAL
ANTI-JKA: NORMAL
AST SERPL-CCNC: 9 U/L (ref 1–32)
BASOPHILS # BLD MANUAL: 12.2 10*3/MM3 (ref 0–0.2)
BASOPHILS NFR BLD AUTO: ABNORMAL %
BASOPHILS NFR BLD MANUAL: 6 % (ref 0–1.5)
BILIRUB SERPL-MCNC: 0.9 MG/DL (ref 0–1.2)
BLASTS NFR BLD MANUAL: 13 % (ref 0–0)
BLD GP AB SCN SERPL QL: POSITIVE
BUN SERPL-MCNC: 8 MG/DL (ref 6–20)
BUN SERPL-MCNC: 9 MG/DL (ref 6–20)
BUN/CREAT SERPL: 15.1 (ref 7–25)
BUN/CREAT SERPL: 15.5 (ref 7–25)
CALCIUM SPEC-SCNC: 8.2 MG/DL (ref 8.6–10.5)
CALCIUM SPEC-SCNC: 8.5 MG/DL (ref 8.6–10.5)
CHLORIDE SERPL-SCNC: 94 MMOL/L (ref 98–107)
CHLORIDE SERPL-SCNC: 94 MMOL/L (ref 98–107)
CO2 SERPL-SCNC: 24 MMOL/L (ref 22–29)
CO2 SERPL-SCNC: 24 MMOL/L (ref 22–29)
CREAT SERPL-MCNC: 0.53 MG/DL (ref 0.57–1)
CREAT SERPL-MCNC: 0.58 MG/DL (ref 0.57–1)
DEPRECATED RDW RBC AUTO: 45.9 FL (ref 37–54)
DEPRECATED RDW RBC AUTO: 46.7 FL (ref 37–54)
E AG RBC QL: NEGATIVE
EGFRCR SERPLBLD CKD-EPI 2021: 113.2 ML/MIN/1.73
EGFRCR SERPLBLD CKD-EPI 2021: 115.7 ML/MIN/1.73
EOSINOPHIL # BLD MANUAL: 18.3 10*3/MM3 (ref 0–0.4)
EOSINOPHIL NFR BLD AUTO: ABNORMAL %
EOSINOPHIL NFR BLD MANUAL: 9 % (ref 0.3–6.2)
ERYTHROCYTE [DISTWIDTH] IN BLOOD BY AUTOMATED COUNT: 17.3 % (ref 12.3–15.4)
ERYTHROCYTE [DISTWIDTH] IN BLOOD BY AUTOMATED COUNT: 17.4 % (ref 12.3–15.4)
GLOBULIN UR ELPH-MCNC: 3.3 GM/DL
GLUCOSE SERPL-MCNC: 459 MG/DL (ref 65–99)
GLUCOSE SERPL-MCNC: 533 MG/DL (ref 65–99)
HCT VFR BLD AUTO: 22 % (ref 34–46.6)
HCT VFR BLD AUTO: 23.6 % (ref 34–46.6)
HGB BLD-MCNC: 6.5 G/DL (ref 12–15.9)
HGB BLD-MCNC: 7.7 G/DL (ref 12–15.9)
HOLD SPECIMEN: NORMAL
HOLD SPECIMEN: NORMAL
INR PPP: 1.27 (ref 0.93–1.1)
KIDD A ANTIGEN: NEGATIVE
LARGE PLATELETS: ABNORMAL
LYMPHOCYTES # BLD MANUAL: 18.3 10*3/MM3 (ref 0.7–3.1)
LYMPHOCYTES NFR BLD AUTO: ABNORMAL %
LYMPHOCYTES NFR BLD MANUAL: 7 % (ref 5–12)
MCH RBC QN AUTO: 22.3 PG (ref 26.6–33)
MCH RBC QN AUTO: 25.5 PG (ref 26.6–33)
MCHC RBC AUTO-ENTMCNC: 29.5 G/DL (ref 31.5–35.7)
MCHC RBC AUTO-ENTMCNC: 32.6 G/DL (ref 31.5–35.7)
MCV RBC AUTO: 75.7 FL (ref 79–97)
MCV RBC AUTO: 78.1 FL (ref 79–97)
METAMYELOCYTES NFR BLD MANUAL: 4 % (ref 0–0)
MONOCYTES # BLD: 14.23 10*3/MM3 (ref 0.1–0.9)
MONOCYTES NFR BLD AUTO: ABNORMAL %
MYELOCYTES NFR BLD MANUAL: 3 % (ref 0–0)
NEUTROPHILS # BLD AUTO: 83.35 10*3/MM3 (ref 1.7–7)
NEUTROPHILS NFR BLD AUTO: ABNORMAL %
NEUTROPHILS NFR BLD MANUAL: 28 % (ref 42.7–76)
NEUTS BAND NFR BLD MANUAL: 13 % (ref 0–5)
PATHOLOGY REVIEW: YES
PLATELET # BLD AUTO: 363 10*3/MM3 (ref 140–450)
PLATELET # BLD AUTO: 377 10*3/MM3 (ref 140–450)
PMV BLD AUTO: 10.3 FL (ref 6–12)
PMV BLD AUTO: 7.8 FL (ref 6–12)
POIKILOCYTOSIS BLD QL SMEAR: ABNORMAL
POTASSIUM SERPL-SCNC: 3.9 MMOL/L (ref 3.5–5.2)
POTASSIUM SERPL-SCNC: 3.9 MMOL/L (ref 3.5–5.2)
PROMYELOCYTES NFR BLD MANUAL: 8 % (ref 0–0)
PROT SERPL-MCNC: 6.5 G/DL (ref 6–8.5)
PROTHROMBIN TIME: 12.9 SECONDS (ref 9.6–11.7)
RBC # BLD AUTO: 2.9 10*6/MM3 (ref 3.77–5.28)
RBC # BLD AUTO: 3.02 10*6/MM3 (ref 3.77–5.28)
RH BLD: POSITIVE
SARS-COV-2 RNA PNL SPEC NAA+PROBE: NOT DETECTED
SMALL PLATELETS BLD QL SMEAR: ADEQUATE
SODIUM SERPL-SCNC: 129 MMOL/L (ref 136–145)
SODIUM SERPL-SCNC: 131 MMOL/L (ref 136–145)
T&S EXPIRATION DATE: NORMAL
VARIANT LYMPHS NFR BLD MANUAL: 9 % (ref 19.6–45.3)
WBC MORPH BLD: NORMAL
WBC NRBC COR # BLD: 203.3 10*3/MM3 (ref 3.4–10.8)
WBC NRBC COR # BLD: 236.83 10*3/MM3 (ref 3.4–10.8)

## 2022-06-01 PROCEDURE — 36415 COLL VENOUS BLD VENIPUNCTURE: CPT

## 2022-06-01 PROCEDURE — 99284 EMERGENCY DEPT VISIT MOD MDM: CPT

## 2022-06-01 PROCEDURE — 87635 SARS-COV-2 COVID-19 AMP PRB: CPT | Performed by: EMERGENCY MEDICINE

## 2022-06-01 PROCEDURE — 71045 X-RAY EXAM CHEST 1 VIEW: CPT

## 2022-06-01 PROCEDURE — 86922 COMPATIBILITY TEST ANTIGLOB: CPT

## 2022-06-01 PROCEDURE — 86901 BLOOD TYPING SEROLOGIC RH(D): CPT | Performed by: EMERGENCY MEDICINE

## 2022-06-01 PROCEDURE — 86900 BLOOD TYPING SEROLOGIC ABO: CPT | Performed by: EMERGENCY MEDICINE

## 2022-06-01 PROCEDURE — 85610 PROTHROMBIN TIME: CPT | Performed by: EMERGENCY MEDICINE

## 2022-06-01 PROCEDURE — 99215 OFFICE O/P EST HI 40 MIN: CPT | Performed by: INTERNAL MEDICINE

## 2022-06-01 PROCEDURE — 80053 COMPREHEN METABOLIC PANEL: CPT

## 2022-06-01 PROCEDURE — 85025 COMPLETE CBC W/AUTO DIFF WBC: CPT | Performed by: EMERGENCY MEDICINE

## 2022-06-01 PROCEDURE — 0 IOPAMIDOL PER 1 ML: Performed by: INTERNAL MEDICINE

## 2022-06-01 PROCEDURE — 85007 BL SMEAR W/DIFF WBC COUNT: CPT | Performed by: EMERGENCY MEDICINE

## 2022-06-01 PROCEDURE — 86902 BLOOD TYPE ANTIGEN DONOR EA: CPT

## 2022-06-01 PROCEDURE — 85025 COMPLETE CBC W/AUTO DIFF WBC: CPT

## 2022-06-01 PROCEDURE — 63710000001 INSULIN LISPRO (HUMAN) PER 5 UNITS: Performed by: EMERGENCY MEDICINE

## 2022-06-01 PROCEDURE — 74177 CT ABD & PELVIS W/CONTRAST: CPT

## 2022-06-01 PROCEDURE — 99222 1ST HOSP IP/OBS MODERATE 55: CPT

## 2022-06-01 PROCEDURE — 86850 RBC ANTIBODY SCREEN: CPT | Performed by: EMERGENCY MEDICINE

## 2022-06-01 PROCEDURE — 86870 RBC ANTIBODY IDENTIFICATION: CPT | Performed by: EMERGENCY MEDICINE

## 2022-06-01 RX ORDER — INSULIN LISPRO 100 [IU]/ML
8 INJECTION, SOLUTION INTRAVENOUS; SUBCUTANEOUS ONCE
Status: COMPLETED | OUTPATIENT
Start: 2022-06-01 | End: 2022-06-01

## 2022-06-01 RX ORDER — SODIUM CHLORIDE, SODIUM LACTATE, POTASSIUM CHLORIDE, CALCIUM CHLORIDE 600; 310; 30; 20 MG/100ML; MG/100ML; MG/100ML; MG/100ML
125 INJECTION, SOLUTION INTRAVENOUS CONTINUOUS
Status: DISCONTINUED | OUTPATIENT
Start: 2022-06-01 | End: 2022-06-05

## 2022-06-01 RX ORDER — SODIUM CHLORIDE 0.9 % (FLUSH) 0.9 %
10 SYRINGE (ML) INJECTION AS NEEDED
Status: DISCONTINUED | OUTPATIENT
Start: 2022-06-01 | End: 2022-06-10 | Stop reason: HOSPADM

## 2022-06-01 RX ADMIN — SODIUM CHLORIDE, POTASSIUM CHLORIDE, SODIUM LACTATE AND CALCIUM CHLORIDE 125 ML/HR: 600; 310; 30; 20 INJECTION, SOLUTION INTRAVENOUS at 22:58

## 2022-06-01 RX ADMIN — INSULIN LISPRO 8 UNITS: 100 INJECTION, SOLUTION INTRAVENOUS; SUBCUTANEOUS at 22:58

## 2022-06-01 RX ADMIN — IOPAMIDOL 100 ML: 755 INJECTION, SOLUTION INTRAVENOUS at 23:57

## 2022-06-01 NOTE — PROGRESS NOTES
Specialty Pharmacy Note: Tasigna    Did not see patient in clinic today due to respiratory distress and sent to ED for evaluation.  Due to elevated WBC, MD asked to check fill history of Tasigna.  Contacted [facility] and medication was last filled 2/20/22.  Patient was advised to go from our office to ED for management of respiratory symptoms as well as elevated glucose.        6/1/2022   WBC 3.40 - 10.80 10*3/mm3 236.83 (A)   Hemoglobin 12.0 - 15.9 g/dL 7.7 (A)   Hematocrit 34.0 - 46.6 % 23.6 (A)   Platelets 140 - 450 10*3/mm3 377   Creatinine 0.57 - 1.00 mg/dL 0.53 (A)   BUN 6 - 20 mg/dL 8   Sodium 136 - 145 mmol/L 131 (A)   Potassium 3.5 - 5.2 mmol/L 3.9   Glucose 65 - 99 mg/dL 459 (A)   Calcium 8.6 - 10.5 mg/dL 8.5 (A)       Thanks,    Ros CALHOUN, PharmD

## 2022-06-01 NOTE — TELEPHONE ENCOUNTER
I called pt and spoke with her mother she states pt isn't home. I advised her to get a hold of pt and have her recheck glucose and treat with insulin. She states she doesn't know if pt has a meter or insulin. I advised her if she doesn't pt needs to go to ER for evaluation. Pt's mother verbalized understanding.

## 2022-06-02 ENCOUNTER — APPOINTMENT (OUTPATIENT)
Dept: GENERAL RADIOLOGY | Facility: HOSPITAL | Age: 47
End: 2022-06-02

## 2022-06-02 PROBLEM — R00.0 TACHYCARDIA: Chronic | Status: ACTIVE | Noted: 2022-06-02

## 2022-06-02 PROBLEM — E78.5 DYSLIPIDEMIA: Chronic | Status: ACTIVE | Noted: 2021-09-28

## 2022-06-02 PROBLEM — C92.10: Status: ACTIVE | Noted: 2022-06-02

## 2022-06-02 PROBLEM — C92.10 CML (CHRONIC MYELOCYTIC LEUKEMIA): Chronic | Status: ACTIVE | Noted: 2018-04-01

## 2022-06-02 PROBLEM — F41.8 DEPRESSION WITH ANXIETY: Status: ACTIVE | Noted: 2021-03-11

## 2022-06-02 PROBLEM — F41.8 DEPRESSION WITH ANXIETY: Chronic | Status: ACTIVE | Noted: 2021-03-11

## 2022-06-02 PROBLEM — E78.5 DYSLIPIDEMIA: Chronic | Status: RESOLVED | Noted: 2021-09-28 | Resolved: 2022-06-02

## 2022-06-02 PROBLEM — D64.9 SEVERE ANEMIA: Chronic | Status: ACTIVE | Noted: 2021-03-11

## 2022-06-02 LAB
ANION GAP SERPL CALCULATED.3IONS-SCNC: 13 MMOL/L (ref 5–15)
ANISOCYTOSIS BLD QL: ABNORMAL
BASOPHILS # BLD MANUAL: 24.37 10*3/MM3 (ref 0–0.2)
BASOPHILS NFR BLD MANUAL: 12 % (ref 0–1.5)
BLASTS NFR BLD MANUAL: 13 % (ref 0–0)
BUN SERPL-MCNC: 11 MG/DL (ref 6–20)
BUN/CREAT SERPL: 22.4 (ref 7–25)
CALCIUM SPEC-SCNC: 8.7 MG/DL (ref 8.6–10.5)
CHLORIDE SERPL-SCNC: 98 MMOL/L (ref 98–107)
CO2 SERPL-SCNC: 25 MMOL/L (ref 22–29)
CREAT SERPL-MCNC: 0.49 MG/DL (ref 0.57–1)
DEPRECATED RDW RBC AUTO: 46.4 FL (ref 37–54)
EGFRCR SERPLBLD CKD-EPI 2021: 117.9 ML/MIN/1.73
EOSINOPHIL # BLD MANUAL: 10.16 10*3/MM3 (ref 0–0.4)
EOSINOPHIL NFR BLD MANUAL: 5 % (ref 0.3–6.2)
ERYTHROCYTE [DISTWIDTH] IN BLOOD BY AUTOMATED COUNT: 17.5 % (ref 12.3–15.4)
GLUCOSE BLDC GLUCOMTR-MCNC: 102 MG/DL (ref 70–105)
GLUCOSE BLDC GLUCOMTR-MCNC: 140 MG/DL (ref 70–105)
GLUCOSE BLDC GLUCOMTR-MCNC: 200 MG/DL (ref 70–105)
GLUCOSE BLDC GLUCOMTR-MCNC: 287 MG/DL (ref 70–105)
GLUCOSE BLDC GLUCOMTR-MCNC: 311 MG/DL (ref 70–105)
GLUCOSE BLDC GLUCOMTR-MCNC: 318 MG/DL (ref 70–105)
GLUCOSE BLDC GLUCOMTR-MCNC: 433 MG/DL (ref 70–105)
GLUCOSE BLDC GLUCOMTR-MCNC: 455 MG/DL (ref 70–105)
GLUCOSE BLDC GLUCOMTR-MCNC: 477 MG/DL (ref 70–105)
GLUCOSE SERPL-MCNC: 123 MG/DL (ref 65–99)
HBA1C MFR BLD: 10.2 % (ref 3.5–5.6)
HCT VFR BLD AUTO: 23.9 % (ref 34–46.6)
HCT VFR BLD AUTO: 24.7 % (ref 34–46.6)
HGB BLD-MCNC: 7.2 G/DL (ref 12–15.9)
HGB BLD-MCNC: 7.6 G/DL (ref 12–15.9)
LAB AP CASE REPORT: NORMAL
LARGE PLATELETS: ABNORMAL
LYMPHOCYTES # BLD MANUAL: 16.25 10*3/MM3 (ref 0.7–3.1)
LYMPHOCYTES NFR BLD MANUAL: 4 % (ref 5–12)
MCH RBC QN AUTO: 23.1 PG (ref 26.6–33)
MCHC RBC AUTO-ENTMCNC: 30.3 G/DL (ref 31.5–35.7)
MCV RBC AUTO: 76.2 FL (ref 79–97)
METAMYELOCYTES NFR BLD MANUAL: 10 % (ref 0–0)
MONOCYTES # BLD: 8.12 10*3/MM3 (ref 0.1–0.9)
MYELOCYTES NFR BLD MANUAL: 5 % (ref 0–0)
NEUTROPHILS # BLD AUTO: 79.21 10*3/MM3 (ref 1.7–7)
NEUTROPHILS NFR BLD MANUAL: 38 % (ref 42.7–76)
NEUTS BAND NFR BLD MANUAL: 1 % (ref 0–5)
PATH REPORT.FINAL DX SPEC: NORMAL
PLATELET # BLD AUTO: 373 10*3/MM3 (ref 140–450)
PMV BLD AUTO: 7.7 FL (ref 6–12)
POIKILOCYTOSIS BLD QL SMEAR: ABNORMAL
POTASSIUM SERPL-SCNC: 3.5 MMOL/L (ref 3.5–5.2)
PROMYELOCYTES NFR BLD MANUAL: 4 % (ref 0–0)
RBC # BLD AUTO: 3.14 10*6/MM3 (ref 3.77–5.28)
SCAN SLIDE: NORMAL
SODIUM SERPL-SCNC: 136 MMOL/L (ref 136–145)
VARIANT LYMPHS NFR BLD MANUAL: 8 % (ref 19.6–45.3)
WBC MORPH BLD: NORMAL
WBC NRBC COR # BLD: 203.1 10*3/MM3 (ref 3.4–10.8)

## 2022-06-02 PROCEDURE — 85018 HEMOGLOBIN: CPT | Performed by: INTERNAL MEDICINE

## 2022-06-02 PROCEDURE — 85025 COMPLETE CBC W/AUTO DIFF WBC: CPT

## 2022-06-02 PROCEDURE — 99232 SBSQ HOSP IP/OBS MODERATE 35: CPT | Performed by: INTERNAL MEDICINE

## 2022-06-02 PROCEDURE — 86900 BLOOD TYPING SEROLOGIC ABO: CPT

## 2022-06-02 PROCEDURE — 85014 HEMATOCRIT: CPT | Performed by: INTERNAL MEDICINE

## 2022-06-02 PROCEDURE — 82962 GLUCOSE BLOOD TEST: CPT

## 2022-06-02 PROCEDURE — 99214 OFFICE O/P EST MOD 30 MIN: CPT | Performed by: INTERNAL MEDICINE

## 2022-06-02 PROCEDURE — 63710000001 ONDANSETRON PER 8 MG

## 2022-06-02 PROCEDURE — 36430 TRANSFUSION BLD/BLD COMPNT: CPT

## 2022-06-02 PROCEDURE — P9016 RBC LEUKOCYTES REDUCED: HCPCS

## 2022-06-02 PROCEDURE — G0378 HOSPITAL OBSERVATION PER HR: HCPCS

## 2022-06-02 PROCEDURE — 63710000001 INSULIN LISPRO (HUMAN) PER 5 UNITS

## 2022-06-02 PROCEDURE — 85007 BL SMEAR W/DIFF WBC COUNT: CPT

## 2022-06-02 PROCEDURE — 86902 BLOOD TYPE ANTIGEN DONOR EA: CPT

## 2022-06-02 PROCEDURE — 74018 RADEX ABDOMEN 1 VIEW: CPT

## 2022-06-02 PROCEDURE — 83036 HEMOGLOBIN GLYCOSYLATED A1C: CPT

## 2022-06-02 PROCEDURE — 63710000001 INSULIN ISOPHANE & REGULAR PER 5 UNITS

## 2022-06-02 PROCEDURE — 80048 BASIC METABOLIC PNL TOTAL CA: CPT

## 2022-06-02 RX ORDER — ACETAMINOPHEN 325 MG/1
650 TABLET ORAL EVERY 4 HOURS PRN
Status: DISCONTINUED | OUTPATIENT
Start: 2022-06-02 | End: 2022-06-10 | Stop reason: HOSPADM

## 2022-06-02 RX ORDER — CHOLECALCIFEROL (VITAMIN D3) 125 MCG
5 CAPSULE ORAL NIGHTLY PRN
Status: DISCONTINUED | OUTPATIENT
Start: 2022-06-02 | End: 2022-06-10 | Stop reason: HOSPADM

## 2022-06-02 RX ORDER — NITROGLYCERIN 0.4 MG/1
0.4 TABLET SUBLINGUAL
Status: DISCONTINUED | OUTPATIENT
Start: 2022-06-02 | End: 2022-06-10 | Stop reason: HOSPADM

## 2022-06-02 RX ORDER — ALPRAZOLAM 0.25 MG/1
0.25 TABLET ORAL NIGHTLY PRN
Status: DISPENSED | OUTPATIENT
Start: 2022-06-02 | End: 2022-06-09

## 2022-06-02 RX ORDER — OLANZAPINE 10 MG/2ML
1 INJECTION, POWDER, LYOPHILIZED, FOR SOLUTION INTRAMUSCULAR
Status: DISCONTINUED | OUTPATIENT
Start: 2022-06-02 | End: 2022-06-07

## 2022-06-02 RX ORDER — INSULIN LISPRO 100 [IU]/ML
1 INJECTION, SOLUTION INTRAVENOUS; SUBCUTANEOUS
COMMUNITY
Start: 2022-02-04 | End: 2022-07-09 | Stop reason: HOSPADM

## 2022-06-02 RX ORDER — INSULIN LISPRO 100 [IU]/ML
0-14 INJECTION, SOLUTION INTRAVENOUS; SUBCUTANEOUS AS NEEDED
Status: DISCONTINUED | OUTPATIENT
Start: 2022-06-02 | End: 2022-06-07

## 2022-06-02 RX ORDER — TRAZODONE HYDROCHLORIDE 50 MG/1
50 TABLET ORAL NIGHTLY
Status: DISCONTINUED | OUTPATIENT
Start: 2022-06-02 | End: 2022-06-03

## 2022-06-02 RX ORDER — SODIUM CHLORIDE 0.9 % (FLUSH) 0.9 %
10 SYRINGE (ML) INJECTION EVERY 12 HOURS SCHEDULED
Status: DISCONTINUED | OUTPATIENT
Start: 2022-06-02 | End: 2022-06-10 | Stop reason: HOSPADM

## 2022-06-02 RX ORDER — DEXTROSE MONOHYDRATE 25 G/50ML
25 INJECTION, SOLUTION INTRAVENOUS
Status: DISCONTINUED | OUTPATIENT
Start: 2022-06-02 | End: 2022-06-07

## 2022-06-02 RX ORDER — ONDANSETRON 2 MG/ML
4 INJECTION INTRAMUSCULAR; INTRAVENOUS EVERY 6 HOURS PRN
Status: DISCONTINUED | OUTPATIENT
Start: 2022-06-02 | End: 2022-06-10 | Stop reason: HOSPADM

## 2022-06-02 RX ORDER — ONDANSETRON 4 MG/1
4 TABLET, FILM COATED ORAL EVERY 6 HOURS PRN
Status: DISCONTINUED | OUTPATIENT
Start: 2022-06-02 | End: 2022-06-10 | Stop reason: HOSPADM

## 2022-06-02 RX ORDER — ACETAMINOPHEN 650 MG/1
650 SUPPOSITORY RECTAL EVERY 4 HOURS PRN
Status: DISCONTINUED | OUTPATIENT
Start: 2022-06-02 | End: 2022-06-10 | Stop reason: HOSPADM

## 2022-06-02 RX ORDER — HYDROXYUREA 500 MG/1
1000 CAPSULE ORAL 2 TIMES DAILY
Status: DISCONTINUED | OUTPATIENT
Start: 2022-06-02 | End: 2022-06-10 | Stop reason: HOSPADM

## 2022-06-02 RX ORDER — OXYCODONE HYDROCHLORIDE 5 MG/1
10 TABLET ORAL EVERY 4 HOURS PRN
Status: DISPENSED | OUTPATIENT
Start: 2022-06-02 | End: 2022-06-09

## 2022-06-02 RX ORDER — ALUMINA, MAGNESIA, AND SIMETHICONE 2400; 2400; 240 MG/30ML; MG/30ML; MG/30ML
15 SUSPENSION ORAL EVERY 6 HOURS PRN
Status: DISCONTINUED | OUTPATIENT
Start: 2022-06-02 | End: 2022-06-10 | Stop reason: HOSPADM

## 2022-06-02 RX ORDER — INSULIN LISPRO 100 [IU]/ML
0-14 INJECTION, SOLUTION INTRAVENOUS; SUBCUTANEOUS
Status: DISCONTINUED | OUTPATIENT
Start: 2022-06-02 | End: 2022-06-07

## 2022-06-02 RX ORDER — INSULIN LISPRO 100 [IU]/ML
15 INJECTION, SOLUTION INTRAVENOUS; SUBCUTANEOUS ONCE
Status: COMPLETED | OUTPATIENT
Start: 2022-06-02 | End: 2022-06-02

## 2022-06-02 RX ORDER — INSULIN LISPRO 100 [IU]/ML
20 INJECTION, SOLUTION INTRAVENOUS; SUBCUTANEOUS ONCE
Status: COMPLETED | OUTPATIENT
Start: 2022-06-02 | End: 2022-06-02

## 2022-06-02 RX ORDER — SODIUM CHLORIDE 0.9 % (FLUSH) 0.9 %
10 SYRINGE (ML) INJECTION AS NEEDED
Status: DISCONTINUED | OUTPATIENT
Start: 2022-06-02 | End: 2022-06-10 | Stop reason: HOSPADM

## 2022-06-02 RX ORDER — ACETAMINOPHEN 160 MG/5ML
650 SOLUTION ORAL EVERY 4 HOURS PRN
Status: DISCONTINUED | OUTPATIENT
Start: 2022-06-02 | End: 2022-06-10 | Stop reason: HOSPADM

## 2022-06-02 RX ORDER — NICOTINE POLACRILEX 4 MG
15 LOZENGE BUCCAL
Status: DISCONTINUED | OUTPATIENT
Start: 2022-06-02 | End: 2022-06-07

## 2022-06-02 RX ADMIN — OXYCODONE 10 MG: 5 TABLET ORAL at 16:56

## 2022-06-02 RX ADMIN — Medication 10 ML: at 21:52

## 2022-06-02 RX ADMIN — ONDANSETRON HYDROCHLORIDE 4 MG: 4 TABLET, FILM COATED ORAL at 10:57

## 2022-06-02 RX ADMIN — INSULIN LISPRO 20 UNITS: 100 INJECTION, SOLUTION INTRAVENOUS; SUBCUTANEOUS at 03:35

## 2022-06-02 RX ADMIN — INSULIN LISPRO 10 UNITS: 100 INJECTION, SOLUTION INTRAVENOUS; SUBCUTANEOUS at 11:54

## 2022-06-02 RX ADMIN — INSULIN LISPRO 15 UNITS: 100 INJECTION, SOLUTION INTRAVENOUS; SUBCUTANEOUS at 01:53

## 2022-06-02 RX ADMIN — OXYCODONE 10 MG: 5 TABLET ORAL at 10:30

## 2022-06-02 RX ADMIN — INSULIN HUMAN 40 UNITS: 100 INJECTION, SUSPENSION SUBCUTANEOUS at 16:57

## 2022-06-02 RX ADMIN — OXYCODONE 10 MG: 5 TABLET ORAL at 02:06

## 2022-06-02 RX ADMIN — SODIUM CHLORIDE, POTASSIUM CHLORIDE, SODIUM LACTATE AND CALCIUM CHLORIDE 125 ML/HR: 600; 310; 30; 20 INJECTION, SOLUTION INTRAVENOUS at 21:40

## 2022-06-02 RX ADMIN — ONDANSETRON HYDROCHLORIDE 4 MG: 4 TABLET, FILM COATED ORAL at 16:56

## 2022-06-02 RX ADMIN — TRAZODONE HYDROCHLORIDE 50 MG: 50 TABLET ORAL at 21:55

## 2022-06-02 RX ADMIN — INSULIN LISPRO 8 UNITS: 100 INJECTION, SOLUTION INTRAVENOUS; SUBCUTANEOUS at 16:56

## 2022-06-02 RX ADMIN — OXYCODONE 10 MG: 5 TABLET ORAL at 21:50

## 2022-06-02 RX ADMIN — HYDROXYUREA 1000 MG: 500 CAPSULE ORAL at 21:50

## 2022-06-02 NOTE — CONSULTS
Hematology/Oncology Inpatient Consultation    Patient name: Nieves Mc  : 1975  MRN: [id number]  Referring Provider: Syed Fournier MD    Reason for Consultation: Uncontrolled Leukemia    Chief complaint: Uncontrolled Leukemia    History of present illness:      Nieves Mc is a 46 y.o. female who presented to [facility] on [facility]22 with complaints of cough for over a month, and SOB.  Patient also has a history of progressive fatigue but denies bleeding.  There is history of contact with someone with cough.  She denies fevers, chills, nausea or vomiting.  She complains of left upper abdominal discomfort at the site of her splenic enlargement.  She was sent to the ER due to tachycardia, significant anemia possible pneumonia.    22  Hematology/Oncology was consulted as she has a history of CML on to Tasigna twice a day.  Patient has been very noncompliant with her treatment.  She was seen in the office with a white count of more than 200,000, significant anemia with hemoglobin of 7.7 and a history of prolonged cough for the past 1 and half months.  Patient has not been seen in the office since end of 2022.  She has a history of noncompliance with her medications.        She  has a past medical history of Bone pain, Extremity pain, Leg pain, Migraine, Pulmonary embolism (HCC), and Vision loss.    PCP: Houston Oro MD    History:  Past Medical History:   Diagnosis Date   • Bone pain    • Extremity pain     Carlin. legs pain   • Leg pain     left leg greater   • Migraine    • Pulmonary embolism (HCC)    • Vision loss     doing surgery   ,   Past Surgical History:   Procedure Laterality Date   • BONE MARROW BIOPSY     • BREAST SURGERY     •  SECTION     • CHOLECYSTECTOMY     • EYE SURGERY      laser surgery due  to hemmorage--- 2021-- another surgery  lt eye11/15/21   • RETINAL DETACHMENT SURGERY     • SPINE SURGERY      Lombardi spinal block   • TUBAL  ABDOMINAL LIGATION     ,   Family History   Problem Relation Age of Onset   • Diabetes Mother    • Diabetes Maternal Grandmother    • Heart attack Maternal Grandmother    • Stroke Maternal Grandmother    ,   Social History     Tobacco Use   • Smoking status: Never Smoker   • Smokeless tobacco: Never Used   Vaping Use   • Vaping Use: Never used   Substance Use Topics   • Alcohol use: No   • Drug use: No   ,   Medications Prior to Admission   Medication Sig Dispense Refill Last Dose   • acetaZOLAMIDE (DIAMOX) 250 MG tablet Take 250 mg by mouth Daily.   Past Week at Unknown time   • ALPRAZolam (Xanax) 0.25 MG tablet Take 1 tablet by mouth At Night As Needed for Anxiety. 30 tablet 0 Past Week at Unknown time   • citalopram (CeleXA) 10 MG tablet Take 1 tablet by mouth Daily. To begin 1/31/22 30 tablet 1 6/1/2022 at Unknown time   • Continuous Blood Gluc Sensor (FreeStyle Zahira 2 Sensor) misc USE 1 sensor AND replace every 2 WEEKS   6/1/2022 at Unknown time   • gabapentin (NEURONTIN) 300 MG capsule Take 1 capsule by mouth 3 (Three) Times a Day. 90 capsule 0 6/1/2022 at Unknown time   • insulin NPH-insulin regular (humuLIN 70/30,novoLIN 70/30) (70-30) 100 UNIT/ML injection Inject 40 Units under the skin into the appropriate area as directed 2 (Two) Times a Day With Meals. 30 mL 12 6/1/2022 at Unknown time   • metoprolol succinate XL (TOPROL-XL) 25 MG 24 hr tablet TAKE 1 TABLET BY MOUTH daily FOR tachycardia   6/1/2022 at Unknown time   • oxyCODONE-acetaminophen (PERCOCET)  MG per tablet Take 1 tablet by mouth Every 8 (Eight) Hours As Needed for Moderate Pain . 90 tablet 0 6/1/2022 at Unknown time   • rivaroxaban (XARELTO) 20 MG tablet Take 1 tablet by mouth Daily. 90 tablet 0 6/1/2022 at Unknown time   • traZODone (DESYREL) 50 MG tablet TAKE 1 OR 2 TABLETS BY MOUTH EVERY NIGHT AT BEDTIME   6/1/2022 at Unknown time   , Scheduled Meds:  insulin lispro, 0-14 Units, Subcutaneous, TID AC  insulin NPH-insulin regular, 40  "Units, Subcutaneous, BID With Meals  sodium chloride, 10 mL, Intravenous, Q12H  traZODone, 50 mg, Oral, Nightly    , Continuous Infusions:  lactated ringers, 125 mL/hr, Last Rate: 125 mL/hr (06/02/22 0502)    , PRN Meds:  •  acetaminophen **OR** acetaminophen **OR** acetaminophen  •  ALPRAZolam  •  aluminum-magnesium hydroxide-simethicone  •  dextrose  •  dextrose  •  glucagon (human recombinant)  •  insulin lispro **AND** insulin lispro  •  melatonin  •  nitroglycerin  •  ondansetron **OR** ondansetron  •  oxyCODONE  •  [COMPLETED] Insert peripheral IV **AND** sodium chloride  •  sodium chloride   Allergies:  Hydromorphone, Morphine, and Propofol    Subjective     ROS:  Review of Systems   Constitutional: Positive for fatigue. Negative for fever.   Respiratory: Positive for cough and shortness of breath.    Gastrointestinal: Positive for abdominal pain.        Left upper quadrant   Neurological: Positive for weakness.        Objective   Vital Signs:   /81   Pulse 101   Temp 97.9 °F (36.6 °C) (Oral)   Resp 17   Ht 162.6 cm (64\")   Wt 56.7 kg (125 lb)   SpO2 98%   BMI 21.46 kg/m²     Physical Exam: (performed by MD)  Physical Exam  Constitutional:       General: She is in acute distress.      Appearance: She is ill-appearing.   HENT:      Right Ear: External ear normal.      Left Ear: External ear normal.      Nose: Nose normal.      Mouth/Throat:      Mouth: Mucous membranes are moist.      Pharynx: No oropharyngeal exudate.   Eyes:      Extraocular Movements: Extraocular movements intact.      Conjunctiva/sclera: Conjunctivae normal.      Pupils: Pupils are equal, round, and reactive to light.   Cardiovascular:      Rate and Rhythm: Tachycardia present.      Pulses: Normal pulses.   Pulmonary:      Effort: Pulmonary effort is normal.      Breath sounds: Normal breath sounds.   Abdominal:      General: Abdomen is flat.   Skin:     General: Skin is warm.   Neurological:      General: No focal deficit " present.      Mental Status: She is alert and oriented to person, place, and time. Mental status is at baseline.   Psychiatric:         Behavior: Behavior normal.         Thought Content: Thought content normal.         Judgment: Judgment normal.         Results Review:  Lab Results (last 48 hours)     Procedure Component Value Units Date/Time    Pathology Consultation [[id number]] Collected: 06/01/22 2037    Specimen: Blood, Venous Line Updated: 06/02/22 0728    POC Glucose Once [[id number]]  (Normal) Collected: 06/02/22 0722    Specimen: Blood Updated: 06/02/22 0723     Glucose 102 mg/dL      Comment: Serial Number: [id number]:  [id number]       POC Glucose Once [[id number]]  (Abnormal) Collected: 06/02/22 0435    Specimen: Blood Updated: 06/02/22 0436     Glucose 200 mg/dL      Comment: Serial Number: [id number]:  [id number]       POC Glucose Once [[id number]]  (Abnormal) Collected: 06/02/22 0246    Specimen: Blood Updated: 06/02/22 0248     Glucose 433 mg/dL      Comment: Serial Number: [id number]:  [id number]       POC Glucose Once [[id number]]  (Abnormal) Collected: 06/02/22 0142    Specimen: Blood Updated: 06/02/22 0145     Glucose 455 mg/dL      Comment: Serial Number: [id number]:  [id number]       POC Glucose Once [[id number]]  (Abnormal) Collected: 06/02/22 0020    Specimen: Blood Updated: 06/02/22 0021     Glucose 477 mg/dL      Comment: Serial Number: [id number]:  [id number]       COVID PRE-OP / PRE-PROCEDURE SCREENING ORDER (NO ISOLATION) - Swab, Nasopharynx [[id number]]  (Normal) Collected: 06/01/22 2251    Specimen: Swab from Nasopharynx Updated: 06/01/22 2329    Narrative:      The following orders were created for panel order COVID PRE-OP / PRE-PROCEDURE SCREENING ORDER (NO ISOLATION) - Swab, Nasopharynx.  Procedure                               Abnormality         Status                     ---------                               -----------         ------                      COVID-19,CEPHEID/JORGITO,CO...[[id number]]  Normal              Final result                 Please view results for these tests on the individual orders.    COVID-19,CEPHEID/JORGITO,COR/MARVIN/PAD/THOMAS IN-HOUSE(OR EMERGENT/ADD-ON),NP SWAB IN TRANSPORT MEDIA 3-4 HR TAT, RT-PCR - Swab, Nasopharynx [[id number]]  (Normal) Collected: 06/01/22 2251    Specimen: Swab from Nasopharynx Updated: 06/01/22 2329     COVID19 Not Detected    Narrative:      Fact sheet for providers: https://www.fda.gov/media/336073/download     Fact sheet for patients: https://www.fda.gov/media/096534/download  Fact sheet for providers: https://www.fda.gov/media/570210/download    Fact sheet for patients: https://www.fda.gov/media/446378/download    Test performed by PCR.    Path Consult Reflex [[id number]] Collected: 06/01/22 2037    Specimen: Blood Updated: 06/01/22 2211     Pathology Review Yes    CBC & Differential [[id number]]  (Abnormal) Collected: 06/01/22 2037    Specimen: Blood Updated: 06/01/22 2211    Narrative:      The following orders were created for panel order CBC & Differential.  Procedure                               Abnormality         Status                     ---------                               -----------         ------                     CBC Auto Differential[[id number]]        Abnormal            Final result               Scan Slide[[id number]]                                                                    Please view results for these tests on the individual orders.    CBC Auto Differential [[id number]]  (Abnormal) Collected: 06/01/22 2037    Specimen: Blood Updated: 06/01/22 2211     .30 10*3/mm3      RBC 2.90 10*6/mm3      Hemoglobin 6.5 g/dL      Hematocrit 22.0 %      MCV 75.7 fL      MCH 22.3 pg      MCHC 29.5 g/dL      RDW 17.3 %      RDW-SD 45.9 fl      MPV 7.8 fL      Platelets 363 10*3/mm3     Narrative:      The previously reported component NRBC is no longer being reported. Previous result was 0.1 /100 WBC  (Reference Range: 0.0-0.2 /100 WBC) on 6/1/2022 at 2056 EDT.    Manual Differential [[id number]]  (Abnormal) Collected: 06/01/22 2037    Specimen: Blood Updated: 06/01/22 2211     Neutrophil % 28.0 %      Lymphocyte % 9.0 %      Monocyte % 7.0 %      Eosinophil % 9.0 %      Basophil % 6.0 %      Bands %  13.0 %      Metamyelocyte % 4.0 %      Myelocyte % 3.0 %      Promyelocyte % 8.0 %      Blasts % 13.0 %      Neutrophils Absolute 83.35 10*3/mm3      Lymphocytes Absolute 18.30 10*3/mm3      Monocytes Absolute 14.23 10*3/mm3      Eosinophils Absolute 18.30 10*3/mm3      Basophils Absolute 12.20 10*3/mm3      Anisocytosis Slight/1+     Poikilocytes Slight/1+     WBC Morphology Normal     Platelet Estimate Adequate     Large Platelets Slight/1+    Comprehensive Metabolic Panel [[id number]]  (Abnormal) Collected: 06/01/22 2037    Specimen: Blood Updated: 06/01/22 2124     Glucose 533 mg/dL      BUN 9 mg/dL      Creatinine 0.58 mg/dL      Sodium 129 mmol/L      Potassium 3.9 mmol/L      Chloride 94 mmol/L      CO2 24.0 mmol/L      Calcium 8.2 mg/dL      Total Protein 6.5 g/dL      Albumin 3.20 g/dL      ALT (SGPT) <5 U/L      AST (SGOT) 9 U/L      Alkaline Phosphatase 949 U/L      Total Bilirubin 0.9 mg/dL      Globulin 3.3 gm/dL      A/G Ratio 1.0 g/dL      BUN/Creatinine Ratio 15.5     Anion Gap 11.0 mmol/L      eGFR 113.2 mL/min/1.73      Comment: National Kidney Foundation and American Society of Nephrology (ASN) Task Force recommended calculation based on the Chronic Kidney Disease Epidemiology Collaboration (CKD-EPI) equation refit without adjustment for race.       Narrative:      GFR Normal >60  Chronic Kidney Disease <60  Kidney Failure <15      Protime-INR [[id number]]  (Abnormal) Collected: 06/01/22 2037    Specimen: Blood Updated: 06/01/22 2101     Protime 12.9 Seconds      INR 1.27           Pending Results:     Imaging Reviewed:   CT Abdomen Pelvis With Contrast    Result Date: 6/2/2022  1. No acute process  seen within the abdomen or pelvis. 2. Marked hepatosplenomegaly. 3. Small amount of pelvic ascites. Electronically signed by:  Juan Colón D.O.  6/1/2022 10:34 PM    XR Chest 1 View    Result Date: 6/1/2022  Hazy densities in both lungs, which could represent mild pulmonary edema. This is similar to chest CT from January 2022.  Electronically Signed By-Herbert Eldridge MD On:6/1/2022 10:57 PM This report was finalized on [id number] by  Herbert Eldridge MD.    XR Abdomen KUB    Result Date: 6/2/2022  1.Opacity of bowel gas left quadrant related to splenomegaly.  Electronically Signed By-Ha Gamboa MD On:6/2/2022 12:03 PM This report was finalized on [id number] by  Ha Gamboa MD.           Assessment & Plan   ASSESSMENT    This is a 46-year-old female with past history of CML presenting from oncology office with cough and shortness of breath as well as noncompliance issues with Tasigna and Hydrea who was found to have a decreasing hemoglobin to 6.5 and received 1 unit of PRBCs, and WBC of 203.3, possible pulmonary edema on chest x-ray, and glucose of 533. The patient has received blood transfusions for decreasing Hgb level with no active bleed on CT scan, Xarelto is on hold.  Oncology is consulted due to being established patient of Dr Lerma with CML, noncompliance with Tasigna regimen.      CML/chronic pain  Hyperleukocytosis  Noncompliance with medicine  Plan to begin hydroxyurea 1 g twice a day and monitor her CBCs  Verify with pharmacist    Bone marrow December 2018 reveals chronic phase CML  Noncompliance with Tasigna since February 2022  Path consult shows severe leukocytosis with left shift and abundant circulating blasts, microcytic anemia    Severe anemia  Received 1 unit of packed red blood cell for significant anemia overnight  Hgb 6.5, microcytic, hypochromic  CT abdomen/pelvis- no signs of bleed    Will hold Xarelto      Other chronic conditions: Diabetes mellitus type 2-uncontrolled,  noncompliance with insulin; depression with anxiety, tachycardia      PLAN  1. CBC daily  2. Monitor for bleeding    Electronically signed by JP Griffin, 06/02/22, 7:49 AM EDT.    Thank you for this consult. We will be happy to follow along with you.     This is a 46-year-old female with a history of CML on to Tasigna twice a day.  Patient has been very noncompliant with her treatment.  She was seen in the office with a white count of more than 200,000, significant anemia with hemoglobin of 7.7 and a history of prolonged cough for the past 1 and half months.  Patient has not been seen in the office since end of March 2022.  She has a history of noncompliance with her medications.    Physical exam: Patient appears chronically ill, pale  Her lungs were clear the rest of exam was only significant for splenomegaly    I reviewed her labs, imaging studies progress notes    We will plan to begin hydroxyurea 1 g twice a day  Restart TKI in the outpatient setting if patient agrees  We will continue to follow  Check LDH, uric acid  Follow daily CBCs      Electronically signed by Meghann Lerma MD, 06/02/22, 1:32 PM EDT.

## 2022-06-02 NOTE — PLAN OF CARE
Goal Outcome Evaluation:      C/o leg pain, med was given with relief. Tolerated 1 unit of packed RBC. Heart monitor on. Able to make her needs known.  Blood sugar checked throughout the shift, last result was 200. Call light within reach.

## 2022-06-02 NOTE — PLAN OF CARE
Goal Outcome Evaluation:  Plan of Care Reviewed With: patient        Progress: improving     Patient receiving 2nd infusion of blood, c/o pain to abd and lower left ext, PRN meds given with relief. Will continue to monitor

## 2022-06-02 NOTE — H&P
"    [facility]      Patient Name: Nieves Mc  : 1975  MRN: [id number]  Primary Care Physician:  Houston Oro MD  Date of admission: 2022      Subjective      Chief Complaint: Uncontrolled leukemia    History of Present Illness: Nieves Mc is a 46 y.o. female with a past medical history of CML, diabetes mellitus type 2, diabetic neuropathy, severe anemia, dyslipidemia, depression with anxiety, and medical noncompliance.  She presented to [facility] on 2022 from her oncologist's office because \"her leukemia is not controlled\".  She reports abdominal pain which has been occurring since January in addition to a cough.  She states that she does have chest pain when she coughs at times.  She reports a chronic ache in her legs. and that her blood sugar has been high.  Per oncology she remains in the chronic phase based on her bone marrow and she is noncompliant with her treatment.    In the ED, a chest x-ray showed hazy densities in both lungs which could represent mild pulmonary edema.  Similar to a chest CT from 2022.  Glucose on arrival was 533, sodium 129, creatinine 0.58, .3, hemoglobin 6.5, hematocrit 22.  All other labs are unremarkable.  All vital signs are stable with the exception of her heart rate which is tachycardic at 121.  She received IV fluids, 8 units of insulin, 1 unit PRBCs, and the oncologist was consulted.  Hospitalist was consulted for further care and management.    Review of Systems   Constitutional: Positive for malaise/fatigue.   HENT: Negative.    Eyes: Negative.    Cardiovascular: Positive for palpitations (\"My heart is thundering out of my chest\").   Respiratory: Positive for cough and shortness of breath.    Hematologic/Lymphatic: Negative.    Skin: Negative.    Musculoskeletal: Negative.    Gastrointestinal: Negative.    Genitourinary: Negative.    Neurological: Negative.    Psychiatric/Behavioral: " Negative.    Allergic/Immunologic: Negative.         Personal History     Past Medical History:   Diagnosis Date   • Bone pain    • Extremity pain     Carlin. legs pain   • Leg pain     left leg greater   • Migraine    • Pulmonary embolism (HCC)    • Vision loss     doing surgery       Past Surgical History:   Procedure Laterality Date   • BONE MARROW BIOPSY     • BREAST SURGERY     •  SECTION     • CHOLECYSTECTOMY     • EYE SURGERY      laser surgery due  to hemmorage--- 2021-- another surgery  lt eye11/15/21   • RETINAL DETACHMENT SURGERY     • SPINE SURGERY      Lombardi spinal block   • TUBAL ABDOMINAL LIGATION         Family History: family history includes Diabetes in her maternal grandmother and mother; Heart attack in her maternal grandmother; Stroke in her maternal grandmother. Otherwise pertinent FHx was reviewed and not pertinent to current issue.    Social History:  reports that she has never smoked. She has never used smokeless tobacco. She reports that she does not drink alcohol and does not use drugs.    Home Medications:  Prior to Admission Medications     Prescriptions Last Dose Informant Patient Reported? Taking?    acetaZOLAMIDE (DIAMOX) 250 MG tablet   Yes No    Take 250 mg by mouth Daily.    ALPRAZolam (Xanax) 0.25 MG tablet   No No    Take 1 tablet by mouth At Night As Needed for Anxiety.    citalopram (CeleXA) 10 MG tablet   No No    Take 1 tablet by mouth Daily. To begin 22    Continuous Blood Gluc Sensor (FreeStyle Zahira 2 Sensor) misc   Yes No    USE 1 sensor AND replace every 2 WEEKS    gabapentin (NEURONTIN) 300 MG capsule   No No    Take 1 capsule by mouth 3 (Three) Times a Day.    insulin NPH-insulin regular (humuLIN 70/30,novoLIN 70/30) (70-30) 100 UNIT/ML injection   No No    Inject 40 Units under the skin into the appropriate area as directed 2 (Two) Times a Day With Meals.    metoprolol succinate XL (TOPROL-XL) 25 MG 24 hr tablet   Yes No    TAKE 1 TABLET BY MOUTH  daily FOR tachycardia    oxyCODONE-acetaminophen (PERCOCET)  MG per tablet   No No    Take 1 tablet by mouth 2 (Two) Times a Day As Needed for Moderate Pain .    oxyCODONE-acetaminophen (PERCOCET)  MG per tablet   No No    Take 1 tablet by mouth Every 8 (Eight) Hours As Needed for Moderate Pain .    rivaroxaban (XARELTO) 20 MG tablet   No No    Take 1 tablet by mouth Daily.    traZODone (DESYREL) 50 MG tablet   Yes No    TAKE 1 OR 2 TABLETS BY MOUTH EVERY NIGHT AT BEDTIME        Allergies:  Allergies   Allergen Reactions   • Hydromorphone GI Intolerance     dilaudid   • Morphine Nausea And Vomiting   • Propofol Hives       Objective      Vitals:   Temp:  [97.1 °F (36.2 °C)-98.5 °F (36.9 °C)] 98.5 °F (36.9 °C)  Heart Rate:  [104-121] 104  Resp:  [18-20] 20  BP: (144-152)/(80-91) 152/91    Physical Exam  Vitals and nursing note reviewed.   Constitutional:       Appearance: Normal appearance. She is ill-appearing.   HENT:      Head: Normocephalic and atraumatic.      Nose: Nose normal.      Mouth/Throat:      Mouth: Mucous membranes are moist.   Eyes:      Extraocular Movements: Extraocular movements intact.      Pupils: Pupils are equal, round, and reactive to light.   Cardiovascular:      Rate and Rhythm: Normal rate and regular rhythm.      Pulses: Normal pulses.      Heart sounds: Normal heart sounds.   Pulmonary:      Effort: Pulmonary effort is normal.      Breath sounds: Normal breath sounds.   Abdominal:      General: Abdomen is protuberant. Bowel sounds are normal.      Palpations: Abdomen is soft. There is splenomegaly.   Musculoskeletal:         General: Normal range of motion.      Cervical back: Normal range of motion.   Skin:     General: Skin is warm and dry.   Neurological:      Mental Status: She is alert and oriented to person, place, and time. Mental status is at baseline.   Psychiatric:         Mood and Affect: Mood normal.         Behavior: Behavior normal.       Result Review    Result  Review:  I have personally reviewed the results from the time of this admission to 6/2/2022 01:56 EDT and agree with these findings:  [x]  Laboratory  []  Microbiology  [x]  Radiology  []  EKG/Telemetry   []  Cardiology/Vascular   []  Pathology  [x]  Old records  []  Other:  Most notable findings include: hemoglobin 6.5, glucose 533, .3      Assessment & Plan        Active Hospital Problems:  Active Hospital Problems    Diagnosis    • **CML (chronic myelocytic leukemia) (HCC)    • Tachycardia    • Severe anemia    • Medically noncompliant    • Depression with anxiety      Plan:      CML with associated chronic pain  -WBCs 203.3, monitor  -Per oncology note, currently in chronic phase based on her bone marrow  -She admits noncompliance with treatment (tasigna 300 mg p.o. twice daily) since February 2022.  -Continue home Percocet  -Dr. Lerma consulted    Severe anemia  -Hemoglobin 6.5, decreased from 7.7 approximately 4 hours prior  -CT of the abdomen and pelvis shows no sign of GI bleed  -Monitor H&H  -Type and cross  -1 unit PRBC ordered for transfusion in ED  -Hold home Xarelto for now    Diabetes mellitus type 2  -Hemoglobin A1c ordered  -Patient also reports noncompliance with insulin  -Blood glucose 533 on arrival to ED decreased to 477 after receiving 8 units of regular insulin  -Accu-Cheks AC at bedtime  -Continue home NPH  -Continue home gabapentin  -SSI ordered    Depression with anxiety  -Chronic, stable  -Continue home alprazolam, Celexa, trazodone    Tachycardia  -EKG reviewed  -Continuous cardiac monitoring  -Continue home metoprolol succinate XL    DVT prophylaxis:  Mechanical DVT prophylaxis orders are present.    CODE STATUS:    Code Status (Patient has no pulse and is not breathing): CPR (Attempt to Resuscitate)  Medical Interventions (Patient has pulse or is breathing): Full Support    Admission Status:  I believe this patient meets inpatient status.    I discussed the patient's findings and  my recommendations with patient.    This patient has been examined wearing appropriate Personal Protective Equipment  06/02/22      Signature: Electronically signed by JP Lancaster, 06/02/22, 1:56 AM EDT.

## 2022-06-02 NOTE — CONSULTS
"Diabetes Education  Assessment/Teaching    Patient Name:  Nieves Mc  YOB: 1975  MRN: [id number]  Admit Date:  6/1/2022      Assessment Date:  6/2/2022  Flowsheet Row Most Recent Value   General Information     Referral From: A1c, Blood glucose  [Pt seen due to adm bs of 459. A1c this adm 10.2%.]   Height 162.6 cm (64\")   Height Method Stated   Weight 56.7 kg (125 lb)   Weight Method Stated   Pregnancy Assessment    Diabetes History    Length of Diabetes Diagnosis --  [dx 2018]   Have you had diabetes education/teaching in the past? yes   When and where was your diabetes education? Pt was seen by inpatient diabetes educator at [facility] on 1/14/2022   Do you test your blood sugar at home? no  [Pt was wearing the Freestyle Zahira 2 but DME company has not been sending sensors for past month. Pt unable to check bs due to poor vision. She states has never had a blood glucose meter.]   Have you had low blood sugar? (<70mg/dl) no   Education Preferences    Nutrition Information    Assessment Topics    DM Goals           Flowsheet Row Most Recent Value   DM Education Needs    Meter Meter provided  [Gave One Touch Verio Flex and instructed mother in use. She states she has diabetes herself and verbalized understanding of use of meter.]   Meter Type One Touch   Frequency of Testing 3 times a day  [Discussed mother checking pt's blood glucose 3 times/day and to record bs. Gave log book.]   Blood Glucose Target Range Discussed A1c result of 10.2%. Reviewed healthy bs range and healthy A1c target. Discussed importance of bs control.   Medication Insulin, Pen  [Pt to be taking Soliqua 25 units daily (ordered on 2/8/2022) and Humalog per sliding scale. Per pharmacy, the Soliqua has never been filled. The Humalog was filled on 2/4/2022.]   Problem Solving Hypoglycemia, Signs, Symptoms, Treatment   Reducing Risks A1C testing  [Gave A1c info sheet]   Healthy Eating --  [Pt states has not been eating routinely. She has " been staying at grandmother's home. She will be moving back in with her mother at discharge.]   Motivation Engaged   Teaching Method Explanation, Discussion, Demonstration, Handouts   Patient Response Verbalized understanding, Demonstrates adequately            Other Comments:  Mother at bedside during education session. Pt states moved in with grandmother due to health issues grandmother was having. Prior to that move, she was living with mother after discharged from hospital on 1/17/2022. Mother states pt was doing well and taking insulin as rxd. She was checking bs with the Zahira 2 CGMS. Mother feels she is not receiving the glucose sensors anymore due to pt's insurance had changed. Pt states MD changed her insulin from Novolin 70/30 40 units bid to daily insulin injection 25 units. She is unsure of name of insulin. Pt states since living with grandmother, she has not been taking the insulin. Contacted pharmacy and they have rx on file for Soliqua from 2/8/2022 and has not been filled. Rx also for Humalog Kwikpen from 2/4/2022 and was filled the same day. Discussed with pt reason for not taking the Soliqua. Pt states she has been under much stress living with grandmother. Mother discussed she will help pt at discharge and help with insulin injections and checking bs. Mother states will reach out to [employer] company regarding reason pt not receiving glucose sensors. Rxs started for One Touch Verio test strips and One Touch Delica Plus lancets. Mother states will  Soliqua and Humalog from pharmacy also. Pt/mother with no further questions at this time.       Electronically signed by:  Valerie Bunn RN  06/02/22 18:09 EDT

## 2022-06-02 NOTE — CASE MANAGEMENT/SOCIAL WORK
Discharge Planning Assessment   Mt     Patient Name: Nieves Mc  MRN: [id number]  Today's Date: 6/2/2022    Admit Date: 6/1/2022     Discharge Needs Assessment     Row Name 06/02/22 1346       Living Environment    People in Home parent(s);child(tobin), dependent    Name(s) of People in Home Sayra Cabezas, Mom, and 12 year old daughter    Current Living Arrangements home    Primary Care Provided by self    Provides Primary Care For child(tobin)    Family Caregiver if Needed spouse    Family Caregiver Names mom    Quality of Family Relationships supportive    Able to Return to Prior Arrangements yes       Resource/Environmental Concerns    Resource/Environmental Concerns none    Transportation Concerns none       Transition Planning    Patient/Family Anticipates Transition to home with family    Patient/Family Anticipated Services at Transition none    Transportation Anticipated family or friend will provide       Discharge Needs Assessment    Readmission Within the Last 30 Days no previous admission in last 30 days    Equipment Currently Used at Home none    Concerns to be Addressed discharge planning    Anticipated Changes Related to Illness none    Equipment Needed After Discharge none    Provided Post Acute Provider List? N/A    N/A Provider List Comment Denies dc needs               Discharge Plan     Row Name 06/02/22 7880       Plan    Plan Anticipate home with family.    Patient/Family in Agreement with Plan yes    Plan Comments Spoke with patient at bedside.  Confirmed pcp and pharmacy.  Patient states she lives with her mom and 12 year old daughter.  Patient reports trouble affording some of her medications.  I discussed with  Sharon Pappas .  I also reached out to pharmacy to see if they had any information on help with medications.   Patient denies dc needs.  States her mom will transport her home at discharge.              Continued Care and Services - Admitted Since 6/1/2022     Coordination has not been started for this encounter.     Selected Continued Care - Episodes Includes selections from active Coordinated Care Management episodes    Oncology Episode start date: 1/19/2022   There are no active outsourced providers for this episode.                  Demographic Summary     Row Name 06/02/22 5843       General Information    Admission Type observation    Arrived From emergency department    Referral Source admission list    Reason for Consult discharge planning;care coordination/care conference    Preferred Language English       Contact Information    Permission Granted to Share Info With                Functional Status     Row Name 06/02/22 8103       Functional Status    Usual Activity Tolerance good    Current Activity Tolerance moderate       Functional Status, IADL    Medications independent    Meal Preparation independent    Housekeeping independent    Laundry independent    Shopping independent       Mental Status    General Appearance WDL WDL       Mental Status Summary    Recent Changes in Mental Status/Cognitive Functioning no changes                Esther Perales RN   Met with patient in room wearing PPE: mask, face shield/goggles, gloves, gown.      Maintained distance greater than six feet and spent less than 15 minutes in the room.

## 2022-06-02 NOTE — PROGRESS NOTES
[facility] Daily Progress Note    Patient Name: Nieves Mc  : 1975  MRN: [id number]  Primary Care Physician:  Houston Oro MD  Date of admission: 2022      Subjective      Chief Complaint: Shortness of breath      Patient Reports she still not feeling well.  Complaining of nausea.  Abdominal x-ray did not show any acute findings.  Breathing seems to be slightly better today.  Hemoglobin 7.2.  Transfusion given.    ROS negative except as above      Objective      Vitals:   Temp:  [97.8 °F (36.6 °C)-98.8 °F (37.1 °C)] 98.3 °F (36.8 °C)  Heart Rate:  [] 105  Resp:  [15-26] 26  BP: (123-159)/(67-91) 142/82  Flow (L/min):  [1-2] 2    Physical Exam  Vitals reviewed.   Constitutional:       Comments: Wearing nasal cannula  Appears quite ill     HENT:      Head: Normocephalic.      Nose: Nose normal.   Cardiovascular:      Rate and Rhythm: Regular rhythm. Tachycardia present.      Pulses: Normal pulses.   Pulmonary:      Effort: Pulmonary effort is normal.   Abdominal:      General: There is distension.      Comments: Significant splenomegaly   Musculoskeletal:         General: Normal range of motion.      Cervical back: Normal range of motion.   Skin:     General: Skin is warm.      Capillary Refill: Capillary refill takes less than 2 seconds.   Neurological:      General: No focal deficit present.   Psychiatric:         Mood and Affect: Mood normal.             Result Review    Result Review:  I have personally reviewed the results from the time of this admission to 2022 16:50 EDT and agree with these findings:  [x]  Laboratory  []  Microbiology  []  Radiology  []  EKG/Telemetry   []  Cardiology/Vascular   []  Pathology  []  Old records  []  Other:  Most notable findings include: Hemoglobin 7.2        Assessment & Plan    46-year-old lady on chemotherapy presents with sepsis pneumonia shortness of breath tachycardia anemia     Active Hospital  Problems:       Active Hospital Problems     Diagnosis     • **CML (chronic myelocytic leukemia) (HCC)     • Tachycardia     • Severe anemia     • Medically noncompliant     • Depression with anxiety        Plan:       CML with associated chronic pain  -WBCs 203.3, monitor  -Per oncology note, currently in chronic phase based on her bone marrow  -She admits noncompliance with treatment (tasigna 300 mg p.o. twice daily) since February 2022.  -Continue home Percocet  -Dr. Lerma consulted appreciate assistance     Severe anemia  -Hemoglobin 6.5, decreased from 7.7 approximately 4 hours prior  -CT of the abdomen and pelvis shows no sign of GI bleed  -Monitor H&H  -Type and cross  -1 unit PRBC ordered for transfusion in ED  -Hold home Xarelto for now  -Another unit given June 2  Hemoglobin above 7.5 continue to monitor closely     Diabetes mellitus type 2  -Hemoglobin A1c ordered  -Patient also reports noncompliance with insulin  -Blood glucose 533 on arrival to ED decreased to 477 after receiving 8 units of regular insulin  -Accu-Cheks AC at bedtime  -Continue home NPH  -Continue home gabapentin  -SSI ordered     Depression with anxiety  -Chronic, stable  -Continue home alprazolam, Celexa, trazodone     Tachycardia  -EKG reviewed  -Continuous cardiac monitoring  -Continue home metoprolol succinate XL    Persistent nausea vomiting  Abdominal x-ray normal  Supportive care with Zofran     DVT prophylaxis:  Mechanical DVT prophylaxis orders are present.     CODE STATUS:    Code Status (Patient has no pulse and is not breathing): CPR (Attempt to Resuscitate)  Medical Interventions (Patient has pulse or is breathing): Full Support     Admission Status:  I believe this patient meets  observation status.     I discussed the patient's findings and my recommendations with patient.     This patient has been examined wearing appropriate Personal Protective Equipment   06/02/22      Electronically signed by Villa Patel MD,  06/02/22, 16:50 EDT.  [facility] Hospitalist Team

## 2022-06-02 NOTE — ED PROVIDER NOTES
Subjective   History of Present Illness  36-year-old female presents from oncologist office.  She reports that her leukemia is not controlled.  She complains of abdominal pain which she states she has had since January she has had some cough for about a month.  She has some chest pain with cough.  She states she feels hot but others do not think she is.  She states she has had some spotting.  She complains of aches in her legs.  This is been going on for a while.  She reports her blood sugars been high.  She has apparently been noncompliant with treatment from oncology note.  She remains in chronic phase based on bone marrow.  Hydroxyurea has been discontinued.  Patient does not complain to me of being short of breath but apparently did at the oncologist office.  She has had no vomiting diarrhea.  No melena.  Review of Systems   Constitutional: Negative for fever and unexpected weight change.   HENT: Negative for congestion and sore throat.    Eyes: Positive for visual disturbance. Negative for pain.        Retinopathy   Respiratory: Positive for cough. Negative for shortness of breath.    Cardiovascular: Positive for chest pain. Negative for leg swelling.   Gastrointestinal: Positive for abdominal pain. Negative for anal bleeding, blood in stool, diarrhea and vomiting.   Endocrine:        Reports blood sugar has been reading high   Genitourinary: Negative for dysuria and urgency.   Musculoskeletal: Negative for back pain.   Skin: Negative for rash.   Neurological: Negative for dizziness, weakness and headaches.   Psychiatric/Behavioral: Negative for confusion. The patient is nervous/anxious.        Past Medical History:   Diagnosis Date   • Bone pain    • Extremity pain     Carlin. legs pain   • Leg pain     left leg greater   • Migraine    • Pulmonary embolism (HCC)    • Vision loss     doing surgery       Allergies   Allergen Reactions   • Hydromorphone GI Intolerance     dilaudid   • Morphine Nausea And Vomiting    • Propofol Hives       Past Surgical History:   Procedure Laterality Date   • BONE MARROW BIOPSY     • BREAST SURGERY     •  SECTION     • CHOLECYSTECTOMY     • EYE SURGERY      laser surgery due  to hemmorage--- 2021-- another surgery  lt eye11/15/21   • RETINAL DETACHMENT SURGERY     • SPINE SURGERY      Lombardi spinal block   • TUBAL ABDOMINAL LIGATION         Family History   Problem Relation Age of Onset   • Diabetes Mother    • Diabetes Maternal Grandmother    • Heart attack Maternal Grandmother    • Stroke Maternal Grandmother        Social History     Socioeconomic History   • Marital status:    Tobacco Use   • Smoking status: Never Smoker   • Smokeless tobacco: Never Used   Vaping Use   • Vaping Use: Never used   Substance and Sexual Activity   • Alcohol use: No   • Drug use: No   • Sexual activity: Defer     Prior to Admission medications    Medication Sig Start Date End Date Taking? Authorizing Provider   acetaZOLAMIDE (DIAMOX) 250 MG tablet Take 250 mg by mouth Daily. 3/9/22   Melecio Almanzar MD   ALPRAZolam (Xanax) 0.25 MG tablet Take 1 tablet by mouth At Night As Needed for Anxiety. 22   Alda Cabello APRN   citalopram (CeleXA) 10 MG tablet Take 1 tablet by mouth Daily. To begin 1/31/22 3/30/22   Meghann Lerma MD   Continuous Blood Gluc Sensor (FreeStyle Zahira 2 Sensor) misc USE 1 sensor AND replace every 2 WEEKS 22   Melecio Almanzar MD   gabapentin (NEURONTIN) 300 MG capsule Take 1 capsule by mouth 3 (Three) Times a Day. 22   Kishore Chatterjee MD   insulin NPH-insulin regular (humuLIN 70/30,novoLIN 70/30) (70-30) 100 UNIT/ML injection Inject 40 Units under the skin into the appropriate area as directed 2 (Two) Times a Day With Meals. 22   Steve Lemos MD   metoprolol succinate XL (TOPROL-XL) 25 MG 24 hr tablet TAKE 1 TABLET BY MOUTH daily FOR tachycardia 3/1/22   Melecio Almanzar MD   oxyCODONE-acetaminophen (PERCOCET)   MG per tablet Take 1 tablet by mouth 2 (Two) Times a Day As Needed for Moderate Pain . 4/7/22   Kishore Chatterjee MD   oxyCODONE-acetaminophen (PERCOCET)  MG per tablet Take 1 tablet by mouth Every 8 (Eight) Hours As Needed for Moderate Pain . 5/9/22   Kishore Chatterjee MD   rivaroxaban (XARELTO) 20 MG tablet Take 1 tablet by mouth Daily. 1/28/22   Meghann Lerma MD   traZODone (DESYREL) 50 MG tablet TAKE 1 OR 2 TABLETS BY MOUTH EVERY NIGHT AT BEDTIME 3/24/22   Provider, MD Melecio           Objective   Physical Exam  46-year-old female awake alert.  She is chronically ill in appearance.  Pupils equal round react to light.  Oropharynx mucous memories moist neck supple chest clear equal breath sounds.  Cardiovascular regular in rhythm.  Abdomen is soft protuberant.  She is noted to have splenomegaly.  Extremities trace edema.  Neurologic exam without focal findings noted.  Skin without rash.  Psychiatric cooperative.  Procedures           ED Course      Results for orders placed or performed during the hospital encounter of 06/01/22   COVID-19,CEPHEID/JORGITO,COR/MARVIN/PAD/THOMAS IN-HOUSE(OR EMERGENT/ADD-ON),NP SWAB IN TRANSPORT MEDIA 3-4 HR TAT, RT-PCR - Swab, Nasopharynx    Specimen: Nasopharynx; Swab   Result Value Ref Range    COVID19 Not Detected Not Detected - Ref. Range   Comprehensive Metabolic Panel    Specimen: Blood   Result Value Ref Range    Glucose 533 (C) 65 - 99 mg/dL    BUN 9 6 - 20 mg/dL    Creatinine 0.58 0.57 - 1.00 mg/dL    Sodium 129 (L) 136 - 145 mmol/L    Potassium 3.9 3.5 - 5.2 mmol/L    Chloride 94 (L) 98 - 107 mmol/L    CO2 24.0 22.0 - 29.0 mmol/L    Calcium 8.2 (L) 8.6 - 10.5 mg/dL    Total Protein 6.5 6.0 - 8.5 g/dL    Albumin 3.20 (L) 3.50 - 5.20 g/dL    ALT (SGPT) <5 1 - 33 U/L    AST (SGOT) 9 1 - 32 U/L    Alkaline Phosphatase 949 (H) 39 - 117 U/L    Total Bilirubin 0.9 0.0 - 1.2 mg/dL    Globulin 3.3 gm/dL    A/G Ratio 1.0 g/dL    BUN/Creatinine Ratio 15.5 7.0 - 25.0     Anion Gap 11.0 5.0 - 15.0 mmol/L    eGFR 113.2 >60.0 mL/min/1.73   Protime-INR    Specimen: Blood   Result Value Ref Range    Protime 12.9 (H) 9.6 - 11.7 Seconds    INR 1.27 (H) 0.93 - 1.10   CBC Auto Differential    Specimen: Blood   Result Value Ref Range    .30 (C) 3.40 - 10.80 10*3/mm3    RBC 2.90 (L) 3.77 - 5.28 10*6/mm3    Hemoglobin 6.5 (C) 12.0 - 15.9 g/dL    Hematocrit 22.0 (L) 34.0 - 46.6 %    MCV 75.7 (L) 79.0 - 97.0 fL    MCH 22.3 (L) 26.6 - 33.0 pg    MCHC 29.5 (L) 31.5 - 35.7 g/dL    RDW 17.3 (H) 12.3 - 15.4 %    RDW-SD 45.9 37.0 - 54.0 fl    MPV 7.8 6.0 - 12.0 fL    Platelets 363 140 - 450 10*3/mm3   Manual Differential    Specimen: Blood   Result Value Ref Range    Neutrophil % 28.0 (L) 42.7 - 76.0 %    Lymphocyte % 9.0 (L) 19.6 - 45.3 %    Monocyte % 7.0 5.0 - 12.0 %    Eosinophil % 9.0 (H) 0.3 - 6.2 %    Basophil % 6.0 (H) 0.0 - 1.5 %    Bands %  13.0 (H) 0.0 - 5.0 %    Metamyelocyte % 4.0 (H) 0.0 - 0.0 %    Myelocyte % 3.0 (H) 0.0 - 0.0 %    Promyelocyte % 8.0 (H) 0.0 - 0.0 %    Blasts % 13.0 (H) 0.0 - 0.0 %    Neutrophils Absolute 83.35 (H) 1.70 - 7.00 10*3/mm3    Lymphocytes Absolute 18.30 (H) 0.70 - 3.10 10*3/mm3    Monocytes Absolute 14.23 (H) 0.10 - 0.90 10*3/mm3    Eosinophils Absolute 18.30 (H) 0.00 - 0.40 10*3/mm3    Basophils Absolute 12.20 (H) 0.00 - 0.20 10*3/mm3    Anisocytosis Slight/1+ None Seen    Poikilocytes Slight/1+ None Seen    WBC Morphology Normal Normal    Platelet Estimate Adequate Normal    Large Platelets Slight/1+ None Seen   Path Consult Reflex    Specimen: Blood   Result Value Ref Range    Pathology Review Yes    POC Glucose Once    Specimen: Blood   Result Value Ref Range    Glucose 477 (C) 70 - 105 mg/dL   Type & Screen    Specimen: Blood   Result Value Ref Range    ABO Type A     RH type Positive     Antibody Screen Positive     T&S Expiration Date 6/4/2022 11:59:59 PM    Antibody Identification    Specimen: Blood   Result Value Ref Range    Anti-E ANTI-E      "Anti-Jka ANTI-JKA    Patient Antigen Type    Specimen: Blood   Result Value Ref Range    BIG E Negative     Pond A Antigen Negative    Prepare RBC, 1 Units   Result Value Ref Range    Product Code C1942F50     Unit Number [id number]     UNIT  ABO A     UNIT  RH POS     Crossmatch Interpretation Compatible     Dispense Status XM     Blood Expiration Date 202206132359     Blood Type Barcode 6200      CT Abdomen Pelvis With Contrast    Result Date: 6/2/2022  1. No acute process seen within the abdomen or pelvis. 2. Marked hepatosplenomegaly. 3. Small amount of pelvic ascites. Electronically signed by:  Juan Colón D.O.  6/1/2022 10:34 PM    XR Chest 1 View    Result Date: 6/1/2022  Hazy densities in both lungs, which could represent mild pulmonary edema. This is similar to chest CT from January 2022.  Electronically Signed By-Herbert Eldridge MD On:6/1/2022 10:57 PM This report was finalized on [id number] by  Herbert Eldridge MD.    Medications   sodium chloride 0.9 % flush 10 mL (has no administration in time range)   lactated ringers infusion (125 mL/hr Intravenous New Bag 6/1/22 2258)   insulin lispro (ADMELOG) injection 8 Units (8 Units Subcutaneous Given 6/1/22 2258)   iopamidol (ISOVUE-370) 76 % injection 100 mL (100 mL Intravenous Given 6/1/22 2357)     /82 (BP Location: Right arm, Patient Position: Sitting)   Pulse (!) 121   Temp 98.5 °F (36.9 °C) (Oral)   Resp 20   Ht 162.6 cm (64\")   Wt 56.7 kg (125 lb)   SpO2 98%   BMI 21.46 kg/m²                                              MDM  Chart review: Patient was sent from oncologist office for leukemia not having achieved remission.  Comorbidity: As per past history  Differential: Leukemia, anemia, uncontrolled diabetes, splenomegaly,  My EKG interpretation:   Lab: White count 203 with hemoglobin 6.5 platelet count 363 28 segs 13 bands 4 metamyelocytes 3 myelocytes 8 promyelocytes 13 blasts on differential.  Comprehensive metabolic panel " sodium 129, glucose 533 with BUN 9 creatinine 0.58, CO2 24, INR 1.27.  Radiology: I reviewed all x-rays.  Chest x-ray reveals cardiomegaly with normal pulmonary vasculature with some hazy density in both lungs similar to CT scan from earlier this year.  CT scan of abdomen pelvis IV contrast shows no acute process within the abdomen or pelvis there is marked hepatosplenomegaly and a small amount of pelvic ascites.  Lower chest shows some patchy groundglass attenuation which could be inflammatory infectious or mild edema.  There is moderate global cardiomegaly noted.  Discussion/treatment: Patient was started on IV fluids.  She was given insulin 8 units subcu. she will be transfused 1 unit packed red blood cells.  Patient was assessed with Dr. Lerma and nurse practitioner the hospitalist.  She admitted to PCU for continued care.  Patient critical care time of 35 minutes independent of procedure  Patient was evaluated using appropriate PPE      Final diagnoses:   CML (chronic myeloid leukemia) with failed remission (HCC)   Anemia, unspecified type   Generalized abdominal pain   Splenomegaly   Uncontrolled type 1 diabetes mellitus with hypoglycemia, unspecified hypoglycemia coma status (HCC)       ED Disposition  ED Disposition     ED Disposition   Decision to Admit    Condition   --    Comment   Level of Care: Progressive Care [20]   Admitting Physician: BIRDIE PAIGE [[id number]]               No follow-up provider specified.       Medication List      No changes were made to your prescriptions during this visit.          Syed Fournier MD  06/02/22 0037

## 2022-06-03 ENCOUNTER — TELEPHONE (OUTPATIENT)
Dept: ONCOLOGY | Facility: CLINIC | Age: 47
End: 2022-06-03

## 2022-06-03 PROBLEM — C92.10: Status: ACTIVE | Noted: 2022-06-03

## 2022-06-03 LAB
ANION GAP SERPL CALCULATED.3IONS-SCNC: 11 MMOL/L (ref 5–15)
ANISOCYTOSIS BLD QL: ABNORMAL
BASOPHILS # BLD MANUAL: 20.86 10*3/MM3 (ref 0–0.2)
BASOPHILS NFR BLD MANUAL: 12 % (ref 0–1.5)
BH BB BLOOD EXPIRATION DATE: NORMAL
BH BB BLOOD TYPE BARCODE: 6200
BH BB DISPENSE STATUS: NORMAL
BH BB PRODUCT CODE: NORMAL
BH BB UNIT NUMBER: NORMAL
BLASTS NFR BLD MANUAL: 18 % (ref 0–0)
BUN SERPL-MCNC: 13 MG/DL (ref 6–20)
BUN/CREAT SERPL: 28.9 (ref 7–25)
CALCIUM SPEC-SCNC: 7.9 MG/DL (ref 8.6–10.5)
CHLORIDE SERPL-SCNC: 102 MMOL/L (ref 98–107)
CO2 SERPL-SCNC: 25 MMOL/L (ref 22–29)
CREAT SERPL-MCNC: 0.45 MG/DL (ref 0.57–1)
CROSSMATCH INTERPRETATION: NORMAL
CRP SERPL-MCNC: 2.37 MG/DL (ref 0–0.5)
DEPRECATED RDW RBC AUTO: 46.4 FL (ref 37–54)
EGFRCR SERPLBLD CKD-EPI 2021: 120.3 ML/MIN/1.73
EOSINOPHIL # BLD MANUAL: 22.59 10*3/MM3 (ref 0–0.4)
EOSINOPHIL NFR BLD MANUAL: 13 % (ref 0.3–6.2)
ERYTHROCYTE [DISTWIDTH] IN BLOOD BY AUTOMATED COUNT: 17.4 % (ref 12.3–15.4)
GIANT PLATELETS: ABNORMAL
GLUCOSE BLDC GLUCOMTR-MCNC: 167 MG/DL (ref 70–105)
GLUCOSE BLDC GLUCOMTR-MCNC: 191 MG/DL (ref 70–105)
GLUCOSE BLDC GLUCOMTR-MCNC: 334 MG/DL (ref 70–105)
GLUCOSE BLDC GLUCOMTR-MCNC: 363 MG/DL (ref 70–105)
GLUCOSE SERPL-MCNC: 165 MG/DL (ref 65–99)
HCT VFR BLD AUTO: 23.4 % (ref 34–46.6)
HGB BLD-MCNC: 7.4 G/DL (ref 12–15.9)
LYMPHOCYTES # BLD MANUAL: 6.95 10*3/MM3 (ref 0.7–3.1)
LYMPHOCYTES NFR BLD MANUAL: 2 % (ref 5–12)
MAGNESIUM SERPL-MCNC: 1.5 MG/DL (ref 1.6–2.6)
MCH RBC QN AUTO: 23.9 PG (ref 26.6–33)
MCHC RBC AUTO-ENTMCNC: 31.5 G/DL (ref 31.5–35.7)
MCV RBC AUTO: 75.8 FL (ref 79–97)
METAMYELOCYTES NFR BLD MANUAL: 4 % (ref 0–0)
MONOCYTES # BLD: 3.48 10*3/MM3 (ref 0.1–0.9)
MYELOCYTES NFR BLD MANUAL: 3 % (ref 0–0)
NEUTROPHILS # BLD AUTO: 67.78 10*3/MM3 (ref 1.7–7)
NEUTROPHILS NFR BLD MANUAL: 32 % (ref 42.7–76)
NEUTS BAND NFR BLD MANUAL: 7 % (ref 0–5)
PHOSPHATE SERPL-MCNC: 4.4 MG/DL (ref 2.5–4.5)
PLATELET # BLD AUTO: 324 10*3/MM3 (ref 140–450)
PMV BLD AUTO: 7.8 FL (ref 6–12)
POIKILOCYTOSIS BLD QL SMEAR: ABNORMAL
POTASSIUM SERPL-SCNC: 3.9 MMOL/L (ref 3.5–5.2)
PROMYELOCYTES NFR BLD MANUAL: 5 % (ref 0–0)
RBC # BLD AUTO: 3.09 10*6/MM3 (ref 3.77–5.28)
SCAN SLIDE: NORMAL
SODIUM SERPL-SCNC: 138 MMOL/L (ref 136–145)
TOXIC GRANULATION: ABNORMAL
UNIT  ABO: NORMAL
UNIT  RH: NORMAL
VARIANT LYMPHS NFR BLD MANUAL: 4 % (ref 19.6–45.3)
WBC NRBC COR # BLD: 173.8 10*3/MM3 (ref 3.4–10.8)

## 2022-06-03 PROCEDURE — 25010000002 MAGNESIUM SULFATE 2 GM/50ML SOLUTION: Performed by: INTERNAL MEDICINE

## 2022-06-03 PROCEDURE — 85025 COMPLETE CBC W/AUTO DIFF WBC: CPT | Performed by: INTERNAL MEDICINE

## 2022-06-03 PROCEDURE — 63710000001 INSULIN LISPRO (HUMAN) PER 5 UNITS

## 2022-06-03 PROCEDURE — 84100 ASSAY OF PHOSPHORUS: CPT | Performed by: INTERNAL MEDICINE

## 2022-06-03 PROCEDURE — 83735 ASSAY OF MAGNESIUM: CPT | Performed by: INTERNAL MEDICINE

## 2022-06-03 PROCEDURE — 99232 SBSQ HOSP IP/OBS MODERATE 35: CPT | Performed by: INTERNAL MEDICINE

## 2022-06-03 PROCEDURE — 63710000001 INSULIN REGULAR HUMAN PER 5 UNITS: Performed by: NURSE PRACTITIONER

## 2022-06-03 PROCEDURE — 82962 GLUCOSE BLOOD TEST: CPT

## 2022-06-03 PROCEDURE — 80048 BASIC METABOLIC PNL TOTAL CA: CPT | Performed by: INTERNAL MEDICINE

## 2022-06-03 PROCEDURE — 85007 BL SMEAR W/DIFF WBC COUNT: CPT | Performed by: INTERNAL MEDICINE

## 2022-06-03 PROCEDURE — 86140 C-REACTIVE PROTEIN: CPT | Performed by: INTERNAL MEDICINE

## 2022-06-03 RX ORDER — CITALOPRAM 20 MG/1
10 TABLET ORAL DAILY
Status: DISCONTINUED | OUTPATIENT
Start: 2022-06-03 | End: 2022-06-04

## 2022-06-03 RX ORDER — MAGNESIUM SULFATE HEPTAHYDRATE 40 MG/ML
4 INJECTION, SOLUTION INTRAVENOUS AS NEEDED
Status: DISCONTINUED | OUTPATIENT
Start: 2022-06-03 | End: 2022-06-10 | Stop reason: HOSPADM

## 2022-06-03 RX ORDER — TRAZODONE HYDROCHLORIDE 50 MG/1
50 TABLET ORAL NIGHTLY
Status: DISCONTINUED | OUTPATIENT
Start: 2022-06-03 | End: 2022-06-10 | Stop reason: HOSPADM

## 2022-06-03 RX ORDER — METOPROLOL SUCCINATE 25 MG/1
25 TABLET, EXTENDED RELEASE ORAL DAILY
Status: DISCONTINUED | OUTPATIENT
Start: 2022-06-03 | End: 2022-06-10 | Stop reason: HOSPADM

## 2022-06-03 RX ORDER — ACETAZOLAMIDE 250 MG/1
250 TABLET ORAL DAILY
Status: DISCONTINUED | OUTPATIENT
Start: 2022-06-03 | End: 2022-06-10 | Stop reason: HOSPADM

## 2022-06-03 RX ORDER — MAGNESIUM SULFATE HEPTAHYDRATE 40 MG/ML
2 INJECTION, SOLUTION INTRAVENOUS AS NEEDED
Status: DISCONTINUED | OUTPATIENT
Start: 2022-06-03 | End: 2022-06-10 | Stop reason: HOSPADM

## 2022-06-03 RX ORDER — ALPRAZOLAM 0.25 MG/1
0.25 TABLET ORAL NIGHTLY PRN
Status: DISCONTINUED | OUTPATIENT
Start: 2022-06-03 | End: 2022-06-03 | Stop reason: SDUPTHER

## 2022-06-03 RX ORDER — GABAPENTIN 300 MG/1
300 CAPSULE ORAL 3 TIMES DAILY
Status: DISCONTINUED | OUTPATIENT
Start: 2022-06-03 | End: 2022-06-10 | Stop reason: HOSPADM

## 2022-06-03 RX ADMIN — INSULIN LISPRO 3 UNITS: 100 INJECTION, SOLUTION INTRAVENOUS; SUBCUTANEOUS at 11:45

## 2022-06-03 RX ADMIN — OXYCODONE 10 MG: 5 TABLET ORAL at 07:48

## 2022-06-03 RX ADMIN — MAGNESIUM SULFATE HEPTAHYDRATE 2 G: 2 INJECTION, SOLUTION INTRAVENOUS at 09:00

## 2022-06-03 RX ADMIN — SODIUM CHLORIDE, POTASSIUM CHLORIDE, SODIUM LACTATE AND CALCIUM CHLORIDE 125 ML/HR: 600; 310; 30; 20 INJECTION, SOLUTION INTRAVENOUS at 20:16

## 2022-06-03 RX ADMIN — HYDROXYUREA 1000 MG: 500 CAPSULE ORAL at 21:58

## 2022-06-03 RX ADMIN — INSULIN LISPRO 10 UNITS: 100 INJECTION, SOLUTION INTRAVENOUS; SUBCUTANEOUS at 17:09

## 2022-06-03 RX ADMIN — METOPROLOL SUCCINATE 25 MG: 25 TABLET, FILM COATED, EXTENDED RELEASE ORAL at 17:09

## 2022-06-03 RX ADMIN — TRAZODONE HYDROCHLORIDE 50 MG: 50 TABLET ORAL at 21:29

## 2022-06-03 RX ADMIN — OXYCODONE 10 MG: 5 TABLET ORAL at 17:09

## 2022-06-03 RX ADMIN — GABAPENTIN 300 MG: 300 CAPSULE ORAL at 21:29

## 2022-06-03 RX ADMIN — MAGNESIUM SULFATE HEPTAHYDRATE 2 G: 2 INJECTION, SOLUTION INTRAVENOUS at 15:03

## 2022-06-03 RX ADMIN — ACETAZOLAMIDE 250 MG: 250 TABLET ORAL at 18:03

## 2022-06-03 RX ADMIN — MAGNESIUM SULFATE HEPTAHYDRATE 2 G: 2 INJECTION, SOLUTION INTRAVENOUS at 11:46

## 2022-06-03 RX ADMIN — INSULIN LISPRO 3 UNITS: 100 INJECTION, SOLUTION INTRAVENOUS; SUBCUTANEOUS at 07:48

## 2022-06-03 RX ADMIN — GABAPENTIN 300 MG: 300 CAPSULE ORAL at 17:08

## 2022-06-03 RX ADMIN — INSULIN HUMAN 7 UNITS: 100 INJECTION, SOLUTION PARENTERAL at 22:21

## 2022-06-03 RX ADMIN — SODIUM CHLORIDE, POTASSIUM CHLORIDE, SODIUM LACTATE AND CALCIUM CHLORIDE 125 ML/HR: 600; 310; 30; 20 INJECTION, SOLUTION INTRAVENOUS at 05:40

## 2022-06-03 RX ADMIN — HYDROXYUREA 1000 MG: 500 CAPSULE ORAL at 09:00

## 2022-06-03 RX ADMIN — CITALOPRAM HYDROBROMIDE 10 MG: 20 TABLET ORAL at 17:09

## 2022-06-03 NOTE — DISCHARGE PLACEMENT REQUEST
"[facility] referral for assistance with transportation. Please screen for additional services.             J Luis Kowalski (46 y.o. Female)             Date of Birth   1975    Social Security Number       Address   [address] [place] [address]    Home Phone   [phone]    MRN   [id number]       Episcopalian   None    Marital Status                               Admission Date   6/1/22    Admission Type   Emergency    Admitting Provider   Keven Juarez MD    Attending Provider   Villa Patel MD    Department, Room/Bed   [facility] SURGICAL INPATIENT, 4117/1       Discharge Date       Discharge Disposition       Discharge Destination                               Attending Provider: Villa Patel MD    Allergies: Hydromorphone, Morphine, Propofol    Isolation: None   Infection: None   Code Status: CPR   Advance Care Planning Activity    Ht: 162.6 cm (64\")   Wt: 56.7 kg (125 lb)    Admission Cmt: None   Principal Problem: CML (chronic myelocytic leukemia) (HCC) [C92.10]                 Active Insurance as of 6/1/2022     Primary Coverage     Payor Plan Insurance Group Employer/Plan Group    MEDICARE MEDICARE A & B      Payor Plan Address Payor Plan Phone Number Payor Plan Fax Number Effective Dates    [address] [phone]  11/1/2020 - None Entered    [place] [id number]       Subscriber Name Subscriber Birth Date Member ID       J LUIS KOWALSKI 1975 [id number]                 Emergency Contacts      (Rel.) Home Phone Work Phone Mobile Phone    Raulito Sayra (Mother) [phone] -- --    Sayra Santana (Grandparent) -- -- [phone]            "

## 2022-06-03 NOTE — CONSULTS
"Nutrition Services    Patient Name: Nieves Mc  YOB: 1975  MRN: [id number]  Admission date: 2022    Comment:  -- Add Chocolate Boost Glucose Control BID (Provides 380 kcals, 32 g protein if consumed)     -- Moderate chronic disease related malnutrition related to hypermetabolic state of leukemia as evidenced by fat/muscle loss per physical exam.  See MSA below.      PPE Documentation        PPE Worn By Provider mask, gloves and eye protection   PPE Worn By Patient  None      CLINICAL NUTRITION ASSESSMENT      Reason for Assessment 6/3: Consult for Nursing Admission Screen, MST of 4, A1c 10.2%     H&P  46 y.o. female with a past medical history of CML, diabetes mellitus type 2, diabetic neuropathy, severe anemia, dyslipidemia, depression with anxiety, and medical noncompliance.  She presented to [facility] on 2022 from her oncologist's office because \"her leukemia is not controlled\".  She reports abdominal pain which has been occurring since January in addition to a cough.  She states that she does have chest pain when she coughs at times.  She reports a chronic ache in her legs. and that her blood sugar has been high.  Per oncology she remains in the chronic phase based on her bone marrow and she is noncompliant with her treatment.    Past Medical History:   Diagnosis Date   • Bone pain    • Extremity pain     Cralin. legs pain   • Leg pain     left leg greater   • Migraine    • Pulmonary embolism (HCC)    • Vision loss     doing surgery       Past Surgical History:   Procedure Laterality Date   • BONE MARROW BIOPSY     • BREAST SURGERY     •  SECTION     • CHOLECYSTECTOMY     • EYE SURGERY      laser surgery due  to hemmorage--- 2021-- another surgery  lt eye11/15/21   • RETINAL DETACHMENT SURGERY     • SPINE SURGERY      Lombardi spinal block   • TUBAL ABDOMINAL LIGATION          Current Problems   CML with associated chronic pain  -Dr. Lerma consulted     Severe " "anemia    Diabetes mellitus type 2  -Hemoglobin A1c ordered    Depression with anxiety    Tachycardia       Encounter Information        Trending Narrative     6/3: Noted diabetes educator saw patient this admission.  RD visited patient at bedside.  Patient refuses further diet education my RD during visit.  Claims she cannot check her blood sugar because she is \"partially blind\".  Patient states ok appetite, reports nausea but getting PRN Zofran.  No chewing/swallowing issues noted, reports 100# weight loss x 6 months (240# UBW).       Anthropometrics        Current Height, Weight Height: 162.6 cm (64\")  Weight: 56.7 kg (125 lb) (06/01/22 1625)       Ideal Body Weight (IBW) 120#   Usual Body Weight (UBW) 240# per patient        Trending Weight Hx     This admission: 6/3: 125#             PTA: 5.9% weight gain x 3 months  6.1% weight loss x 1 year     Wt Readings from Last 30 Encounters:   06/01/22 1625 56.7 kg (125 lb)   06/01/22 1440 56.7 kg (125 lb)   05/18/22 0920 56.7 kg (125 lb)   05/09/22 1114 56.7 kg (125 lb)   03/29/22 1606 57 kg (125 lb 9.6 oz)   03/01/22 1604 53.5 kg (118 lb)   02/14/22 0850 53.5 kg (118 lb)   01/28/22 1202 58.1 kg (128 lb)   01/24/22 1117 60.3 kg (133 lb)   01/17/22 0536 65.1 kg (143 lb 8.3 oz)   01/16/22 0300 65.1 kg (143 lb 8.3 oz)   01/15/22 0443 64.8 kg (142 lb 13.7 oz)   01/14/22 0425 65.1 kg (143 lb 8.3 oz)   01/13/22 0219 65 kg (143 lb 4.8 oz)   01/12/22 1237 59 kg (130 lb)   11/15/21 1010 59 kg (130 lb)   10/18/21 0814 59 kg (130 lb)   09/28/21 0919 59.3 kg (130 lb 12.8 oz)   09/22/21 1004 60.3 kg (133 lb)   07/19/21 0909 60.3 kg (133 lb)   06/16/21 0843 60.3 kg (133 lb)   06/07/21 0920 60.3 kg (133 lb)   04/19/21 0855 64.3 kg (141 lb 12.8 oz)   04/13/21 1817 61.2 kg (135 lb)   03/11/21 0418 66 kg (145 lb 8.1 oz)   02/22/21 0548 61.7 kg (136 lb 0.4 oz)   10/26/20 1901 68.3 kg (150 lb 9.2 oz)   10/16/19 1811 76 kg (167 lb 8.8 oz)   07/01/19 2239 74.8 kg (165 lb)   06/30/19 1555 " 76.5 kg (168 lb 10.4 oz)   11/21/16 1425 72.6 kg (160 lb)      BMI kg/m2 Body mass index is 21.46 kg/m².       Labs        Pertinent Labs    Results from last 7 days   Lab Units 06/03/22  0520 06/02/22  0747 06/01/22 2037   SODIUM mmol/L 138 136 129*   POTASSIUM mmol/L 3.9 3.5 3.9   CHLORIDE mmol/L 102 98 94*   CO2 mmol/L 25.0 25.0 24.0   BUN mg/dL 13 11 9   CREATININE mg/dL 0.45* 0.49* 0.58   CALCIUM mg/dL 7.9* 8.7 8.2*   BILIRUBIN mg/dL  --   --  0.9   ALK PHOS U/L  --   --  949*   ALT (SGPT) U/L  --   --  <5   AST (SGOT) U/L  --   --  9   GLUCOSE mg/dL 165* 123* 533*     Results from last 7 days   Lab Units 06/03/22  0520   MAGNESIUM mg/dL 1.5*   PHOSPHORUS mg/dL 4.4   HEMOGLOBIN g/dL 7.4*   HEMATOCRIT % 23.4*     COVID19   Date Value Ref Range Status   06/01/2022 Not Detected Not Detected - Ref. Range Final     Lab Results   Component Value Date    HGBA1C 10.2 (H) 06/02/2022        Medications    Scheduled Medications hydroxyurea, 1,000 mg, Oral, BID  insulin lispro, 0-14 Units, Subcutaneous, TID AC  sodium chloride, 10 mL, Intravenous, Q12H  traZODone, 50 mg, Oral, Nightly        Infusions lactated ringers, 125 mL/hr, Last Rate: 125 mL/hr (06/03/22 0540)        PRN Medications •  acetaminophen **OR** acetaminophen **OR** acetaminophen  •  ALPRAZolam  •  aluminum-magnesium hydroxide-simethicone  •  dextrose  •  dextrose  •  glucagon (human recombinant)  •  insulin lispro **AND** insulin lispro  •  magnesium sulfate **OR** magnesium sulfate **OR** magnesium sulfate  •  melatonin  •  nitroglycerin  •  ondansetron **OR** ondansetron  •  oxyCODONE  •  [COMPLETED] Insert peripheral IV **AND** sodium chloride  •  sodium chloride     Physical Findings        Trending Physical   Appearance, NFPE 6/3: NFPE completed, consistent with nutrition diagnosis of malnutrition using AND/ASPEN criteria. See MSA below.      --  Edema  No edema documented      Bowel Function Last BM 6/1 (x 2 days ago)     Tubes No feeding tube       Chewing/Swallowing No issues per patient      Skin Intact      --  Current Nutrition Orders & Evaluation of Intake       Oral Nutrition     Food Allergies NKFA   Current PO Diet Diet Diabetic/Consistent Carbs; Diabetic - Consistent Carb   Supplement None ordered    PO Evaluation     Trending % PO Intake 6/3: 75% average PO intakes since admission    --  Nutritional Risk Screening        NRS-2002 Score          Nutrition Diagnosis         Nutrition Dx Problem 1 Moderate chronic disease related malnutrition related to hypermetabolic state of leukemia as evidenced by fat/muscle loss per physical exam.        Nutrition Dx Problem 2        Intervention Goal         Intervention Goal(s) PO intakes greater than 75%  Accept ONS  No weight loss     Nutrition Intervention        RD Action Add ONS     Nutrition Prescription          Diet Prescription Consistent CHO    Supplement Prescription Chocolate Boost Glucose Control BID   --  Monitor/Evaluation        Monitor Per protocol, I&O, PO intake, Supplement intake, Pertinent labs, Weight, Skin status, GI status, Symptoms, POC/GOC     Malnutrition Severity Assessment      Patient meets criteria for : Moderate (non-severe) Malnutrition  Malnutrition Type (last 8 hours)     Malnutrition Severity Assessment     Row Name 06/03/22 1350       Malnutrition Severity Assessment    Malnutrition Type Chronic Disease - Related Malnutrition    Row Name 06/03/22 1350       Muscle Loss    Loss of Muscle Mass Findings Moderate    [place] Region Moderate - slight depression    Clavicle Bone Region Moderate - some protrusion in females, visible in males    Acromion Bone Region Moderate - acromion may slightly protrude    Scapular Bone Region Moderate - mild depression, bones may show slightly    Dorsal Hand Region Moderate - slight depression    Patellar Region Moderate - patella more prominent, less muscle definition around patella    Anterior Thigh Region Moderate - mild depression on inner  thigh    Posterior Calf Region Moderate - some roundness, slight firmness    Row Name 06/03/22 1350       Fat Loss    Subcutaneous Fat Loss Findings Moderate    Orbital Region  Moderate -  somewhat hollowness, slightly dark circles    Upper Arm Region Moderate - some fat tissue, not ample    Thoracic & Lumbar Region --  UTD, distended belly    Row Name 06/03/22 1350       Criteria Met (Must meet criteria for severity in at least 2 of these categories: M Wasting, Fat Loss, Fluid, Secondary Signs, Wt. Status, Intake)    Patient meets criteria for  Moderate (non-severe) Malnutrition                 Electronically signed by:  Chayo Robert RD  06/03/22 09:15 EDT

## 2022-06-03 NOTE — CASE MANAGEMENT/SOCIAL WORK
Social Work Assessment  [facility]     Patient Name: Nieves Mc  MRN: [id number]  Today's Date: 6/3/2022    Admit Date: 6/1/2022     Discharge Needs Assessment     Row Name 06/03/22 4840       Discharge Needs Assessment    Concerns to be Addressed care coordination/care conferences;financial/insurance;medication    Concerns Comments SW was consulted for medical non-compliance. SW met with pt in room 4117 to discuss further. Pt affirmed living with her mother, and clarified that she previously stayed with her grandmother for only 2 weeks after her grandmother threatened suicide. SW inquired of pt’s grandmother, and pt said she is not any better, but pt knows she cannot care for her anymore, as she’s struggling to care for herself. SW instructed pt to call 911 if she believes her grandmother is going to hurt herself, and APS if she isn’t making good decisions to care for herself. Pt verbalized understanding. In regards to pt, she stated her mother is helping her with her insulin and is looking into why pt hasn’t received the Zahira Rx recently. Pt has Medicare, but not Medicaid. SW confirmed with pt’s mother Sayra that they have NOT applied for Medicaid, and is agreeable for [facility] to help - SW sent email with request and updated pt. Pt stated she does not drive 2/2 vision impairment, so her dtr provides transportation to medical appts. SW offered to submit a referral to [email or web address] to assist with transportation, and pt is agreeable - SW submitted referral today and confirmed it was received. Pt denies further needs at this time.              Met with patient in room wearing PPE: mask.      Maintained distance greater than six feet and spent less than 15 minutes in the room.      BEN Crowell, W  Medical Social Worker  Ph [phone]  Fax [phone]  [email or web address]

## 2022-06-03 NOTE — PROGRESS NOTES
Hematology/Oncology Inpatient Progress Note    PATIENT NAME: Nieves Mc  : 1975  MRN: [id number]    Chief complaint: Uncontrolled Leukemia     History of present illness:      Nieves Mc is a 46 y.o. female who presented to [facility] on 2022 with complaints of cough for over a month, and SOB.  Patient also has a history of progressive fatigue but denies bleeding.  There is history of contact with someone with cough.  She denies fevers, chills, nausea or vomiting.  She complains of left upper abdominal discomfort at the site of her splenic enlargement.  She was sent to the ER due to tachycardia, significant anemia possible pneumonia.     22  Hematology/Oncology was consulted as she has a history of CML on to Tasigna twice a day.  Patient has been very noncompliant with her treatment.  She was seen in the office with a white count of more than 200,000, significant anemia with hemoglobin of 7.7 and a history of prolonged cough for the past 1 and half months.  Patient has not been seen in the office since end of 2022.  She has a history of noncompliance with her medications.         She  has a past medical history of Bone pain, Extremity pain, Leg pain, Migraine, Pulmonary embolism (HCC), and Vision loss.     PCP: Houston Oro MD    Subjective    Patient improved today. No new symptoms, pain is improved.        MEDICATIONS:    Scheduled Meds:  hydroxyurea, 1,000 mg, Oral, BID  insulin lispro, 0-14 Units, Subcutaneous, TID AC  sodium chloride, 10 mL, Intravenous, Q12H  traZODone, 50 mg, Oral, Nightly       Continuous Infusions:  lactated ringers, 125 mL/hr, Last Rate: 125 mL/hr (22 0540)       PRN Meds:  •  acetaminophen **OR** acetaminophen **OR** acetaminophen  •  ALPRAZolam  •  aluminum-magnesium hydroxide-simethicone  •  dextrose  •  dextrose  •  glucagon (human recombinant)  •  insulin lispro **AND** insulin lispro  •  magnesium sulfate **OR** magnesium  "sulfate **OR** magnesium sulfate  •  melatonin  •  nitroglycerin  •  ondansetron **OR** ondansetron  •  oxyCODONE  •  [COMPLETED] Insert peripheral IV **AND** sodium chloride  •  sodium chloride     ALLERGIES:    Allergies   Allergen Reactions   • Hydromorphone GI Intolerance     dilaudid   • Morphine Nausea And Vomiting   • Propofol Hives       Objective    VITALS:   /82   Pulse 105   Temp 98.5 °F (36.9 °C) (Oral)   Resp 19   Ht 162.6 cm (64\")   Wt 56.7 kg (125 lb)   SpO2 95%   BMI 21.46 kg/m²     PHYSICAL EXAM: (performed by MD)  Physical Exam  Constitutional:       Appearance: Normal appearance.   HENT:      Head: Normocephalic and atraumatic.   Eyes:      Pupils: Pupils are equal, round, and reactive to light.   Cardiovascular:      Rate and Rhythm: Normal rate and regular rhythm.      Pulses: Normal pulses.      Heart sounds: No murmur heard.  Pulmonary:      Effort: Pulmonary effort is normal.      Breath sounds: Normal breath sounds.   Abdominal:      General: There is no distension.      Palpations: Abdomen is soft. There is no mass.      Tenderness: There is no abdominal tenderness.   Musculoskeletal:         General: Normal range of motion.      Cervical back: Normal range of motion.   Skin:     General: Skin is warm.   Neurological:      General: No focal deficit present.      Mental Status: She is alert.   Psychiatric:         Mood and Affect: Mood normal.           RECENT LABS:  Lab Results (last 24 hours)     Procedure Component Value Units Date/Time    POC Glucose Once [[id number]]  (Abnormal) Collected: 06/03/22 1112    Specimen: Blood Updated: 06/03/22 1113     Glucose 191 mg/dL      Comment: Serial Number: [id number]:  [id number]       POC Glucose Once [[id number]]  (Abnormal) Collected: 06/03/22 0728    Specimen: Blood Updated: 06/03/22 0729     Glucose 167 mg/dL      Comment: Serial Number: [id number]:  [id number]       Manual Differential [[id number]]  (Abnormal) Collected: " 06/03/22 0520    Specimen: Blood Updated: 06/03/22 0623     Neutrophil % 32.0 %      Lymphocyte % 4.0 %      Monocyte % 2.0 %      Eosinophil % 13.0 %      Basophil % 12.0 %      Bands %  7.0 %      Metamyelocyte % 4.0 %      Myelocyte % 3.0 %      Promyelocyte % 5.0 %      Blasts % 18.0 %      Neutrophils Absolute 67.78 10*3/mm3      Lymphocytes Absolute 6.95 10*3/mm3      Monocytes Absolute 3.48 10*3/mm3      Eosinophils Absolute 22.59 10*3/mm3      Basophils Absolute 20.86 10*3/mm3      Anisocytosis Slight/1+     Poikilocytes Slight/1+     Toxic Granulation Slight/1+     Giant Platelets Mod/2+    Narrative:      Reviewed by Pathologist within the past 30 days on 06.01.2022.      CBC & Differential [[id number]]  (Abnormal) Collected: 06/03/22 0520    Specimen: Blood Updated: 06/03/22 0623    Narrative:      The following orders were created for panel order CBC & Differential.  Procedure                               Abnormality         Status                     ---------                               -----------         ------                     CBC Auto Differential[[id number]]        Abnormal            Final result               Scan Slide[[id number]]                                       Final result                 Please view results for these tests on the individual orders.    Scan Slide [[id number]] Collected: 06/03/22 0520    Specimen: Blood Updated: 06/03/22 0623     Scan Slide --     Comment: See Manual Differential Results       CBC Auto Differential [[id number]]  (Abnormal) Collected: 06/03/22 0520    Specimen: Blood Updated: 06/03/22 0623     .80 10*3/mm3      RBC 3.09 10*6/mm3      Hemoglobin 7.4 g/dL      Hematocrit 23.4 %      MCV 75.8 fL      MCH 23.9 pg      MCHC 31.5 g/dL      RDW 17.4 %      RDW-SD 46.4 fl      MPV 7.8 fL      Platelets 324 10*3/mm3     Narrative:      The previously reported component NRBC is no longer being reported. Previous result was 0.1 /100 WBC (Reference Range:  0.0-0.2 /100 WBC) on 6/3/2022 at 0552 EDT.    Basic Metabolic Panel [[id number]]  (Abnormal) Collected: 06/03/22 0520    Specimen: Blood Updated: 06/03/22 0607     Glucose 165 mg/dL      BUN 13 mg/dL      Creatinine 0.45 mg/dL      Sodium 138 mmol/L      Potassium 3.9 mmol/L      Chloride 102 mmol/L      CO2 25.0 mmol/L      Calcium 7.9 mg/dL      BUN/Creatinine Ratio 28.9     Anion Gap 11.0 mmol/L      eGFR 120.3 mL/min/1.73      Comment: National Kidney Foundation and American Society of Nephrology (ASN) Task Force recommended calculation based on the Chronic Kidney Disease Epidemiology Collaboration (CKD-EPI) equation refit without adjustment for race.       Narrative:      GFR Normal >60  Chronic Kidney Disease <60  Kidney Failure <15      C-reactive Protein [[id number]]  (Abnormal) Collected: 06/03/22 0520    Specimen: Blood Updated: 06/03/22 0607     C-Reactive Protein 2.37 mg/dL     Phosphorus [[id number]]  (Normal) Collected: 06/03/22 0520    Specimen: Blood Updated: 06/03/22 0607     Phosphorus 4.4 mg/dL     Magnesium [[id number]]  (Abnormal) Collected: 06/03/22 0520    Specimen: Blood Updated: 06/03/22 0607     Magnesium 1.5 mg/dL     POC Glucose Once [[id number]]  (Abnormal) Collected: 06/02/22 2154    Specimen: Blood Updated: 06/02/22 2156     Glucose 140 mg/dL      Comment: Serial Number: [id number]:  [id number]       POC Glucose Once [[id number]]  (Abnormal) Collected: 06/02/22 1839    Specimen: Blood Updated: 06/02/22 1841     Glucose 318 mg/dL      Comment: Serial Number: [id number]:  [id number]       POC Glucose Once [[id number]]  (Abnormal) Collected: 06/02/22 1616    Specimen: Blood Updated: 06/02/22 1617     Glucose 287 mg/dL      Comment: Serial Number: [id number]:  [id number]       Hemoglobin & Hematocrit, Blood [[id number]]  (Abnormal) Collected: 06/02/22 1319    Specimen: Blood Updated: 06/02/22 1335     Hemoglobin 7.6 g/dL      Hematocrit 24.7 %           IMAGING REVIEWED:  CT  Abdomen Pelvis With Contrast    Result Date: 6/2/2022  1. No acute process seen within the abdomen or pelvis. 2. Marked hepatosplenomegaly. 3. Small amount of pelvic ascites. Electronically signed by:  Juan Colón D.O.  6/1/2022 10:34 PM    XR Chest 1 View    Result Date: 6/1/2022  Hazy densities in both lungs, which could represent mild pulmonary edema. This is similar to chest CT from January 2022.  Electronically Signed By-Herbert Eldridge MD On:6/1/2022 10:57 PM This report was finalized on [id number] by  Herbert Eldridge MD.    XR Abdomen KUB    Result Date: 6/2/2022  1.Opacity of bowel gas left quadrant related to splenomegaly.  Electronically Signed By-Ha Gamboa MD On:6/2/2022 12:03 PM This report was finalized on [id number] by  Ha Gamboa MD.      Assessment & Plan       This is a 46-year-old female with past history of CML presenting from oncology office with cough and shortness of breath as well as noncompliance issues with Tasigna and Hydrea who was found to have a decreasing hemoglobin to 6.5 and received 1 unit of PRBCs, and WBC of 203.3, possible pulmonary edema on chest x-ray, and glucose of 533. The patient has received blood transfusions for decreasing Hgb level with no active bleed on CT scan, Xarelto is on hold.  Oncology is consulted due to being established patient of Dr Lerma with CML, noncompliance with Tasigna regimen.        CML/chronic pain  Hyperleukocytosis  Noncompliance with medicine  Plan to begin hydroxyurea 1 g twice a day and monitor her CBCs  Verify with pharmacist  Bone marrow December 2018 reveals chronic phase CML  Noncompliance with Tasigna since February 2022  Path consult shows severe leukocytosis with left shift and abundant circulating blasts, microcytic anemia  Continue Hydrea with improvement in WBC down to 173     Severe anemia  Received 1 unit of packed red blood cell for significant anemia   Hgb 7.4, microcytic, hypochromic  CT abdomen/pelvis- no signs of  genie     Will hold Xarelto for now.        Other chronic conditions: Diabetes mellitus type 2-uncontrolled, noncompliance with insulin; depression with anxiety, tachycardia

## 2022-06-03 NOTE — PLAN OF CARE
Problem: Adult Inpatient Plan of Care  Goal: Plan of Care Review  Outcome: Ongoing, Progressing  Flowsheets (Taken 6/3/2022 0511)  Plan of Care Reviewed With: patient  Outcome Evaluation: Patient c/o pain during shift with pain meds effective. Patient slept/rested well throughout shift. Call light and personal items in reach. Plan of care ongoing.  Goal: Patient-Specific Goal (Individualized)  Outcome: Ongoing, Progressing  Goal: Absence of Hospital-Acquired Illness or Injury  Outcome: Ongoing, Progressing  Intervention: Identify and Manage Fall Risk  Recent Flowsheet Documentation  Taken 6/3/2022 0300 by Cyn Jalloh RN  Safety Promotion/Fall Prevention:   safety round/check completed   room organization consistent   nonskid shoes/slippers when out of bed   lighting adjusted   clutter free environment maintained   assistive device/personal items within reach  Taken 6/3/2022 0105 by Cyn Jalloh, RN  Safety Promotion/Fall Prevention:   safety round/check completed   room organization consistent   nonskid shoes/slippers when out of bed   lighting adjusted   clutter free environment maintained   assistive device/personal items within reach   activity supervised  Taken 6/3/2022 0100 by Cyn Jalloh, RN  Safety Promotion/Fall Prevention:   safety round/check completed   room organization consistent   nonskid shoes/slippers when out of bed   lighting adjusted   clutter free environment maintained   assistive device/personal items within reach  Taken 6/2/2022 2300 by Cyn Jalloh, RN  Safety Promotion/Fall Prevention:   safety round/check completed   room organization consistent   nonskid shoes/slippers when out of bed   lighting adjusted   clutter free environment maintained   assistive device/personal items within reach  Taken 6/2/2022 2150 by Cyn Jalloh, RN  Safety Promotion/Fall Prevention:   safety round/check completed   room organization consistent   nonskid shoes/slippers when out of bed    lighting adjusted   clutter free environment maintained   assistive device/personal items within reach  Taken 6/2/2022 2042 by Cyn Jalloh RN  Safety Promotion/Fall Prevention:   safety round/check completed   room organization consistent   nonskid shoes/slippers when out of bed   lighting adjusted   clutter free environment maintained   assistive device/personal items within reach  Intervention: Prevent Skin Injury  Recent Flowsheet Documentation  Taken 6/3/2022 0300 by Cyn Jalloh RN  Body Position: position changed independently  Taken 6/2/2022 2300 by Cyn Jalloh RN  Body Position: position changed independently  Taken 6/2/2022 2042 by Cyn Jalloh RN  Body Position: position changed independently  Skin Protection: adhesive use limited  Intervention: Prevent and Manage VTE (Venous Thromboembolism) Risk  Recent Flowsheet Documentation  Taken 6/3/2022 0300 by Cyn Jalloh RN  Activity Management: activity adjusted per tolerance  Taken 6/2/2022 2300 by Cyn Jalloh RN  Activity Management: activity adjusted per tolerance  Taken 6/2/2022 2042 by Cyn Jalloh RN  Activity Management:   activity adjusted per tolerance   ambulated to bathroom  VTE Prevention/Management:   bilateral   sequential compression devices off   patient refused intervention  Intervention: Prevent Infection  Recent Flowsheet Documentation  Taken 6/3/2022 0300 by Cyn Jalloh RN  Infection Prevention: single patient room provided  Taken 6/3/2022 0105 by Cyn Jalloh RN  Infection Prevention: single patient room provided  Taken 6/3/2022 0100 by Cyn Jalloh RN  Infection Prevention: single patient room provided  Taken 6/2/2022 2300 by Cyn Jalloh RN  Infection Prevention: single patient room provided  Taken 6/2/2022 2150 by Cyn Jalloh RN  Infection Prevention:   single patient room provided   equipment surfaces disinfected   hand hygiene promoted  Taken 6/2/2022 2042 by Shubham  Cyn RN  Infection Prevention: single patient room provided  Goal: Optimal Comfort and Wellbeing  Outcome: Ongoing, Progressing  Intervention: Monitor Pain and Promote Comfort  Recent Flowsheet Documentation  Taken 6/2/2022 2042 by Cyn Jalloh RN  Pain Management Interventions:   position adjusted   no interventions per patient request  Intervention: Provide Person-Centered Care  Recent Flowsheet Documentation  Taken 6/2/2022 2042 by Cyn Jalloh, RN  Trust Relationship/Rapport:   care explained   choices provided   emotional support provided   empathic listening provided   questions answered   questions encouraged   reassurance provided   thoughts/feelings acknowledged  Goal: Readiness for Transition of Care  Outcome: Ongoing, Progressing   Goal Outcome Evaluation:  Plan of Care Reviewed With: patient           Outcome Evaluation: Patient c/o pain during shift with pain meds effective. Patient slept/rested well throughout shift. Call light and personal items in reach. Plan of care ongoing.

## 2022-06-03 NOTE — TELEPHONE ENCOUNTER
Caller: Samuel Cabezas    Relationship: Mother    Best call back number: [phone]    What is the best time to reach you: ASAP    Who are you requesting to speak with (clinical staff, provider,  specific staff member): DR. SCOTT'S NURSE    Do you know the name of the person who called: SAMUEL    What was the call regarding: PATIENT IS IN THE HOSPITAL & SAMUEL IS CONCERNED & HOPING YOU CAN LET HER KNOW WHAT IS GOING ON WITH HER DAUGHTER.  SHE IS ON [facility] VERBAL RELEASE.    Do you require a callback: YES, PLEASE

## 2022-06-03 NOTE — PLAN OF CARE
Goal Outcome Evaluation:               Patient doing well this afternoon, states feels better and pain is more controlled today. Patient has been weaned to room air and is tolerating. Patient has call light within reach and is able to make needs known.

## 2022-06-03 NOTE — CASE MANAGEMENT/SOCIAL WORK
Continued Stay Note  ZOHA[facility]     Patient Name: Nieves Mc  MRN: [id number]  Today's Date: 6/3/2022    Admit Date: 6/1/2022     Discharge Plan     Row Name 06/03/22 1412       Plan    Plan Anticipate home with family    Plan Comments Home with family pending clinical course.             Esther Perales RN   Phone communication or documentation only - no physical contact with patient or family.

## 2022-06-03 NOTE — PROGRESS NOTES
[facility] Medicine Services Daily Progress Note    Patient Name: Nieves Mc  : 1975  MRN: [id number]  Primary Care Physician:  Houston Oro MD  Date of admission: 2022      Subjective      Chief Complaint: Shortness of breath        Patient Reports she feels much better today.  Sitting in the chair.  Hemoglobin stable 7.4.  Wants to go home but still wearing several liters of oxygen.  Denies wearing any oxygen at home.  Counseled about taking her chemotherapy.  Patient states she has been taking it as instructed.     ROS negative except as above      Objective      Vitals:   Temp:  [98 °F (36.7 °C)-98.8 °F (37.1 °C)] 98.7 °F (37.1 °C)  Heart Rate:  [] 113  Resp:  [15-26] 15  BP: (127-165)/() 142/88  Flow (L/min):  [2] 2    Physical Exam  Vitals reviewed.   Constitutional:       Comments: Wearing nasal cannula   no longer ill-appearing     HENT:      Head: Normocephalic.      Nose: Nose normal.   Cardiovascular:      Rate and Rhythm: Regular rhythm. Tachycardia present.      Pulses: Normal pulses.   Pulmonary:      Effort: Pulmonary effort is normal.   Abdominal:      General: There is distension.      Comments: Significant splenomegaly   Musculoskeletal:         General: Normal range of motion.      Cervical back: Normal range of motion.   Skin:     General: Skin is warm.      Capillary Refill: Capillary refill takes less than 2 seconds.   Neurological:      General: No focal deficit present.   Psychiatric:         Mood and Affect: Mood normal.        Result Review    Result Review:  I have personally reviewed the results from the time of this admission to 6/3/2022 16:14 EDT and agree with these findings:  [x]  Laboratory  []  Microbiology  []  Radiology  []  EKG/Telemetry   []  Cardiology/Vascular   []  Pathology  []  Old records  []  Other:  Most notable findings include: 7.4 hemoglobin        Assessment & Plan    46-year-old lady on chemotherapy  presents with sepsis pneumonia shortness of breath tachycardia anemia     Active Hospital Problems:          Active Hospital Problems     Diagnosis     • **CML (chronic myelocytic leukemia) (HCC)     • Tachycardia     • Severe anemia     • Medically noncompliant     • Depression with anxiety        Plan:       CML with associated chronic pain  -WBCs 203.3, monitor  -Per oncology note, currently in chronic phase based on her bone marrow  -She admits noncompliance with treatment (tasigna 300 mg p.o. twice daily) since February 2022.  -Continue home Percocet  -Dr. Lerma consulted appreciate assistance     Severe anemia  -Hemoglobin stable today continue to monitor  -CT of the abdomen and pelvis shows no sign of GI bleed  -Monitor H&H  -Type and cross  -1 unit PRBC ordered for transfusion in ED  -Hold home Xarelto for now  -Another unit given June 2  Hemoglobin above 7.5 continue to monitor closely     Diabetes mellitus type 2  -Hemoglobin A1c ordered  -Patient also reports noncompliance with insulin  -Blood glucose 533 on arrival to ED decreased to 477 after receiving 8 units of regular insulin  -Accu-Cheks AC at bedtime  -Continue home NPH  -Continue home gabapentin  -SSI ordered     Depression with anxiety  -Chronic, stable  -Continue home alprazolam, Celexa, trazodone     Tachycardia  -EKG reviewed  -Continuous cardiac monitoring  -Continue home metoprolol succinate XL     Persistent nausea vomiting  Abdominal x-ray normal  Supportive care with Zofran    Wean off oxygen monitor CBC closely     DVT prophylaxis:  Mechanical DVT prophylaxis orders are present.     CODE STATUS:    Code Status (Patient has no pulse and is not breathing): CPR (Attempt to Resuscitate)  Medical Interventions (Patient has pulse or is breathing): Full Support     Admission Status:  I believe this patient meets  observation status.     I discussed the patient's findings and my recommendations with patient.     This patient has been examined  wearing appropriate Personal Protective Equipment   06/03/22      Electronically signed by Villa Patel MD, 06/03/22, 16:14 EDT.  [facility] Hospitalist Team

## 2022-06-04 ENCOUNTER — APPOINTMENT (OUTPATIENT)
Dept: CT IMAGING | Facility: HOSPITAL | Age: 47
End: 2022-06-04

## 2022-06-04 LAB
ANION GAP SERPL CALCULATED.3IONS-SCNC: 11 MMOL/L (ref 5–15)
ANISOCYTOSIS BLD QL: ABNORMAL
BASOPHILS # BLD MANUAL: 23.09 10*3/MM3 (ref 0–0.2)
BASOPHILS NFR BLD MANUAL: 16 % (ref 0–1.5)
BLASTS NFR BLD MANUAL: 12 % (ref 0–0)
BUN SERPL-MCNC: 11 MG/DL (ref 6–20)
BUN/CREAT SERPL: 21.6 (ref 7–25)
CALCIUM SPEC-SCNC: 8.6 MG/DL (ref 8.6–10.5)
CHLORIDE SERPL-SCNC: 104 MMOL/L (ref 98–107)
CO2 SERPL-SCNC: 22 MMOL/L (ref 22–29)
CREAT SERPL-MCNC: 0.51 MG/DL (ref 0.57–1)
CRP SERPL-MCNC: 1.48 MG/DL (ref 0–0.5)
DEPRECATED RDW RBC AUTO: 45.9 FL (ref 37–54)
EGFRCR SERPLBLD CKD-EPI 2021: 116.8 ML/MIN/1.73
EOSINOPHIL # BLD MANUAL: 12.99 10*3/MM3 (ref 0–0.4)
EOSINOPHIL NFR BLD MANUAL: 9 % (ref 0.3–6.2)
ERYTHROCYTE [DISTWIDTH] IN BLOOD BY AUTOMATED COUNT: 17.4 % (ref 12.3–15.4)
GLUCOSE BLDC GLUCOMTR-MCNC: 153 MG/DL (ref 70–105)
GLUCOSE BLDC GLUCOMTR-MCNC: 240 MG/DL (ref 70–105)
GLUCOSE BLDC GLUCOMTR-MCNC: 269 MG/DL (ref 70–105)
GLUCOSE BLDC GLUCOMTR-MCNC: 350 MG/DL (ref 70–105)
GLUCOSE BLDC GLUCOMTR-MCNC: 369 MG/DL (ref 70–105)
GLUCOSE BLDC GLUCOMTR-MCNC: 373 MG/DL (ref 70–105)
GLUCOSE SERPL-MCNC: 187 MG/DL (ref 65–99)
HCT VFR BLD AUTO: 25.5 % (ref 34–46.6)
HGB BLD-MCNC: 8.1 G/DL (ref 12–15.9)
LARGE PLATELETS: ABNORMAL
LYMPHOCYTES # BLD MANUAL: 10.1 10*3/MM3 (ref 0.7–3.1)
LYMPHOCYTES NFR BLD MANUAL: 5 % (ref 5–12)
MAGNESIUM SERPL-MCNC: 2.3 MG/DL (ref 1.6–2.6)
MCH RBC QN AUTO: 24.1 PG (ref 26.6–33)
MCHC RBC AUTO-ENTMCNC: 31.9 G/DL (ref 31.5–35.7)
MCV RBC AUTO: 75.5 FL (ref 79–97)
METAMYELOCYTES NFR BLD MANUAL: 3 % (ref 0–0)
MICROCYTES BLD QL: ABNORMAL
MONOCYTES # BLD: 7.22 10*3/MM3 (ref 0.1–0.9)
MYELOCYTES NFR BLD MANUAL: 3 % (ref 0–0)
NEUTROPHILS # BLD AUTO: 64.94 10*3/MM3 (ref 1.7–7)
NEUTROPHILS NFR BLD MANUAL: 35 % (ref 42.7–76)
NEUTS BAND NFR BLD MANUAL: 10 % (ref 0–5)
PHOSPHATE SERPL-MCNC: 3.2 MG/DL (ref 2.5–4.5)
PLATELET # BLD AUTO: 339 10*3/MM3 (ref 140–450)
PMV BLD AUTO: 7.6 FL (ref 6–12)
POLYCHROMASIA BLD QL SMEAR: ABNORMAL
POTASSIUM SERPL-SCNC: 4 MMOL/L (ref 3.5–5.2)
RBC # BLD AUTO: 3.38 10*6/MM3 (ref 3.77–5.28)
SCAN SLIDE: NORMAL
SODIUM SERPL-SCNC: 137 MMOL/L (ref 136–145)
VARIANT LYMPHS NFR BLD MANUAL: 7 % (ref 19.6–45.3)
WBC MORPH BLD: NORMAL
WBC NRBC COR # BLD: 144.3 10*3/MM3 (ref 3.4–10.8)

## 2022-06-04 PROCEDURE — 63710000001 INSULIN LISPRO (HUMAN) PER 5 UNITS

## 2022-06-04 PROCEDURE — 83735 ASSAY OF MAGNESIUM: CPT | Performed by: INTERNAL MEDICINE

## 2022-06-04 PROCEDURE — 71275 CT ANGIOGRAPHY CHEST: CPT

## 2022-06-04 PROCEDURE — 85007 BL SMEAR W/DIFF WBC COUNT: CPT | Performed by: INTERNAL MEDICINE

## 2022-06-04 PROCEDURE — 86140 C-REACTIVE PROTEIN: CPT | Performed by: INTERNAL MEDICINE

## 2022-06-04 PROCEDURE — 82962 GLUCOSE BLOOD TEST: CPT

## 2022-06-04 PROCEDURE — 80048 BASIC METABOLIC PNL TOTAL CA: CPT | Performed by: INTERNAL MEDICINE

## 2022-06-04 PROCEDURE — 85025 COMPLETE CBC W/AUTO DIFF WBC: CPT | Performed by: INTERNAL MEDICINE

## 2022-06-04 PROCEDURE — 99232 SBSQ HOSP IP/OBS MODERATE 35: CPT | Performed by: INTERNAL MEDICINE

## 2022-06-04 PROCEDURE — 0 IOPAMIDOL PER 1 ML: Performed by: INTERNAL MEDICINE

## 2022-06-04 PROCEDURE — 63710000001 INSULIN REGULAR HUMAN PER 5 UNITS: Performed by: NURSE PRACTITIONER

## 2022-06-04 PROCEDURE — 84100 ASSAY OF PHOSPHORUS: CPT | Performed by: INTERNAL MEDICINE

## 2022-06-04 RX ORDER — CITALOPRAM 20 MG/1
20 TABLET ORAL DAILY
Status: DISCONTINUED | OUTPATIENT
Start: 2022-06-05 | End: 2022-06-10 | Stop reason: HOSPADM

## 2022-06-04 RX ADMIN — SODIUM CHLORIDE, POTASSIUM CHLORIDE, SODIUM LACTATE AND CALCIUM CHLORIDE 125 ML/HR: 600; 310; 30; 20 INJECTION, SOLUTION INTRAVENOUS at 11:40

## 2022-06-04 RX ADMIN — GABAPENTIN 300 MG: 300 CAPSULE ORAL at 17:06

## 2022-06-04 RX ADMIN — INSULIN LISPRO 12 UNITS: 100 INJECTION, SOLUTION INTRAVENOUS; SUBCUTANEOUS at 17:35

## 2022-06-04 RX ADMIN — ACETAMINOPHEN 650 MG: 325 TABLET ORAL at 11:37

## 2022-06-04 RX ADMIN — HYDROXYUREA 1000 MG: 500 CAPSULE ORAL at 08:38

## 2022-06-04 RX ADMIN — HYDROXYUREA 1000 MG: 500 CAPSULE ORAL at 21:09

## 2022-06-04 RX ADMIN — INSULIN LISPRO 5 UNITS: 100 INJECTION, SOLUTION INTRAVENOUS; SUBCUTANEOUS at 13:20

## 2022-06-04 RX ADMIN — OXYCODONE 10 MG: 5 TABLET ORAL at 17:35

## 2022-06-04 RX ADMIN — GABAPENTIN 300 MG: 300 CAPSULE ORAL at 21:09

## 2022-06-04 RX ADMIN — INSULIN LISPRO 3 UNITS: 100 INJECTION, SOLUTION INTRAVENOUS; SUBCUTANEOUS at 08:38

## 2022-06-04 RX ADMIN — IOPAMIDOL 100 ML: 755 INJECTION, SOLUTION INTRAVENOUS at 14:00

## 2022-06-04 RX ADMIN — SODIUM CHLORIDE, POTASSIUM CHLORIDE, SODIUM LACTATE AND CALCIUM CHLORIDE 125 ML/HR: 600; 310; 30; 20 INJECTION, SOLUTION INTRAVENOUS at 20:10

## 2022-06-04 RX ADMIN — CITALOPRAM HYDROBROMIDE 10 MG: 20 TABLET ORAL at 08:38

## 2022-06-04 RX ADMIN — INSULIN HUMAN 5 UNITS: 100 INJECTION, SOLUTION PARENTERAL at 01:32

## 2022-06-04 RX ADMIN — TRAZODONE HYDROCHLORIDE 50 MG: 50 TABLET ORAL at 21:09

## 2022-06-04 RX ADMIN — GABAPENTIN 300 MG: 300 CAPSULE ORAL at 08:38

## 2022-06-04 RX ADMIN — OXYCODONE 10 MG: 5 TABLET ORAL at 11:37

## 2022-06-04 RX ADMIN — SODIUM CHLORIDE, POTASSIUM CHLORIDE, SODIUM LACTATE AND CALCIUM CHLORIDE 125 ML/HR: 600; 310; 30; 20 INJECTION, SOLUTION INTRAVENOUS at 04:40

## 2022-06-04 RX ADMIN — ACETAZOLAMIDE 250 MG: 250 TABLET ORAL at 08:50

## 2022-06-04 RX ADMIN — METOPROLOL SUCCINATE 25 MG: 25 TABLET, FILM COATED, EXTENDED RELEASE ORAL at 08:38

## 2022-06-04 NOTE — PROGRESS NOTES
[facility] Medicine Services Daily Progress Note    Patient Name: Nieves Mc  : 1975  MRN: [id number]  Primary Care Physician:  Houston Oro MD  Date of admission: 2022      Subjective      Chief Complaint: Shortness of breath        Patient Reports she feels terrible today.  She is emotional crying.  Heart rate is 120s.  She still feels short of breath especially on exertion.  CT angiogram PE done but did not show any thromboembolus.  He did showe bilateral opacities however consistent with pneumonia possibly ARDS     ROS negative except as above    Objective      Vitals:   Temp:  [97.9 °F (36.6 °C)-98 °F (36.7 °C)] 98 °F (36.7 °C)  Heart Rate:  [101-103] 103  Resp:  [14-15] 14  BP: (133-144)/(79-86) 133/79      Physical Exam  Vitals reviewed.   Constitutional:       Comments: On room air today   family at bedside   HENT:      Head: Normocephalic.      Nose: Nose normal.   Cardiovascular:      Rate and Rhythm: Regular rhythm.  Significant tachycardia present.      Pulses: Normal pulses.   Pulmonary:      Effort: Pulmonary effort is normal.   Abdominal:      General: There is distension.      Comments: Significant splenomegaly   Musculoskeletal:         General: Normal range of motion.      Cervical back: Normal range of motion.   Skin:     General: Skin is warm.      Capillary Refill: Capillary refill takes less than 2 seconds.   Neurological:      General: No focal deficit present.   Psychiatric:         Mood and Affect: Mood normal.                     Result Review    Result Review:  I have personally reviewed the results from the time of this admission to 2022 16:08 EDT and agree with these findings:  [x]  Laboratory  []  Microbiology  []  Radiology  []  EKG/Telemetry   []  Cardiology/Vascular   []  Pathology  []  Old records  []  Other:  Most notable findings include: White count improving.  Hemoglobin above 8         Assessment & Plan    46-year-old lady on  chemotherapy presents with sepsis pneumonia shortness of breath tachycardia anemia     Active Hospital Problems:          Active Hospital Problems     Diagnosis     • **CML (chronic myelocytic leukemia) (HCC)     • Tachycardia     • Severe anemia     • Medically noncompliant     • Depression with anxiety        Plan:       CML with associated chronic pain  -WBCs improving  -Per oncology note, currently in chronic phase based on her bone marrow  -She admits noncompliance with treatment (tasigna 300 mg p.o. twice daily) since February 2022.  -Continue home Percocet  -Oncology consulted appreciate assistance     Severe anemia  -Hemoglobin stable today continue to monitor  -CT of the abdomen and pelvis shows no sign of GI bleed  -Monitor H&H  -Type and cross  -1 unit PRBC ordered for transfusion in ED  -Hold home Xarelto for now  -Another unit given June 2  Hemoglobin above 7.5 continue to monitor closely     Diabetes mellitus type 2  -Hemoglobin A1c ordered  -Patient also reports noncompliance with insulin  -Blood glucose 533 on arrival to ED decreased to 477 after receiving 8 units of regular insulin  -Accu-Cheks AC at bedtime  -Continue home NPH  -Continue home gabapentin  -SSI ordered     Depression with anxiety  -Chronic, stable  -Continue home alprazolam, Celexa, trazodone     Tachycardia  -EKG reviewed  -Continuous cardiac monitoring  -Continue home metoprolol succinate XL  -CT PE negative on June 4     Persistent nausea vomiting  Abdominal x-ray normal  Supportive care with Zofran     Weaned off oxygen CT PE shows bilateral opacities pneumonia with possible ARDS     DVT prophylaxis:  Mechanical DVT prophylaxis orders are present.     CODE STATUS:    Code Status (Patient has no pulse and is not breathing): CPR (Attempt to Resuscitate)  Medical Interventions (Patient has pulse or is breathing): Full Support     Admission Status:  I believe this patient meets inpatient status.     I discussed the patient's findings  and my recommendations with patient.     This patient has been examined wearing appropriate Personal Protective Equipment   06/04/22      Electronically signed by Villa Patel MD, 06/04/22, 16:08 EDT.  [facility] Hospitalist Team

## 2022-06-04 NOTE — PROGRESS NOTES
Hematology/Oncology Inpatient Progress Note    PATIENT NAME: Nieves Mc  : 1975  MRN: [id number]    Chief complaint: Uncontrolled Leukemia     History of present illness:      Nieves Mc is a 46 y.o. female who presented to [facility] on 2022 with complaints of cough for over a month, and SOB.  Patient also has a history of progressive fatigue but denies bleeding.  There is history of contact with someone with cough.  She denies fevers, chills, nausea or vomiting.  She complains of left upper abdominal discomfort at the site of her splenic enlargement.  She was sent to the ER due to tachycardia, significant anemia possible pneumonia.     22  Hematology/Oncology was consulted as she has a history of CML on to Tasigna twice a day.  Patient has been very noncompliant with her treatment.  She was seen in the office with a white count of more than 200,000, significant anemia with hemoglobin of 7.7 and a history of prolonged cough for the past 1 and half months.  Patient has not been seen in the office since end of 2022.  She has a history of noncompliance with her medications.         She  has a past medical history of Bone pain, Extremity pain, Leg pain, Migraine, Pulmonary embolism (HCC), and Vision loss.     PCP: Houston Oro MD    Subjective    Had some shortness of breath overnight and this morning.    MEDICATIONS:    Scheduled Meds:  acetaZOLAMIDE, 250 mg, Oral, Daily  citalopram, 10 mg, Oral, Daily  gabapentin, 300 mg, Oral, TID  hydroxyurea, 1,000 mg, Oral, BID  insulin lispro, 0-14 Units, Subcutaneous, TID AC  metoprolol succinate XL, 25 mg, Oral, Daily  sodium chloride, 10 mL, Intravenous, Q12H  traZODone, 50 mg, Oral, Nightly       Continuous Infusions:  lactated ringers, 125 mL/hr, Last Rate: 125 mL/hr (22 1140)       PRN Meds:  •  acetaminophen **OR** acetaminophen **OR** acetaminophen  •  ALPRAZolam  •  aluminum-magnesium  "hydroxide-simethicone  •  dextrose  •  dextrose  •  glucagon (human recombinant)  •  insulin lispro **AND** insulin lispro  •  magnesium sulfate **OR** magnesium sulfate **OR** magnesium sulfate  •  melatonin  •  nitroglycerin  •  ondansetron **OR** ondansetron  •  oxyCODONE  •  [COMPLETED] Insert peripheral IV **AND** sodium chloride  •  sodium chloride     ALLERGIES:    Allergies   Allergen Reactions   • Hydromorphone GI Intolerance     dilaudid   • Morphine Nausea And Vomiting   • Propofol Hives       Objective    VITALS:   /79   Pulse 103   Temp 98 °F (36.7 °C) (Oral)   Resp 14   Ht 162.6 cm (64\")   Wt 56.7 kg (125 lb)   SpO2 96%   BMI 21.46 kg/m²     PHYSICAL EXAM: (performed by MD)  Physical Exam  Constitutional:       Appearance: Normal appearance.   HENT:      Head: Normocephalic and atraumatic.   Eyes:      Pupils: Pupils are equal, round, and reactive to light.   Cardiovascular:      Rate and Rhythm: Normal rate and regular rhythm.      Pulses: Normal pulses.      Heart sounds: No murmur heard.  Pulmonary:      Effort: Pulmonary effort is normal.      Breath sounds: Normal breath sounds.   Abdominal:      General: There is no distension.      Palpations: Abdomen is soft. There is no mass.      Tenderness: There is no abdominal tenderness.   Musculoskeletal:         General: Normal range of motion.      Cervical back: Normal range of motion and neck supple.   Skin:     General: Skin is warm.   Neurological:      General: No focal deficit present.      Mental Status: She is alert.   Psychiatric:         Mood and Affect: Mood normal.           RECENT LABS:  Lab Results (last 24 hours)     Procedure Component Value Units Date/Time    POC Glucose Once [[id number]]  (Abnormal) Collected: 06/04/22 0805    Specimen: Blood Updated: 06/04/22 0806     Glucose 153 mg/dL      Comment: Serial Number: [id number]:  [id number]       Manual Differential [[id number]]  (Abnormal) Collected: 06/04/22 0257    " Specimen: Blood Updated: 06/04/22 0515     Neutrophil % 35.0 %      Lymphocyte % 7.0 %      Monocyte % 5.0 %      Eosinophil % 9.0 %      Basophil % 16.0 %      Bands %  10.0 %      Metamyelocyte % 3.0 %      Myelocyte % 3.0 %      Blasts % 12.0 %      Neutrophils Absolute 64.94 10*3/mm3      Lymphocytes Absolute 10.10 10*3/mm3      Monocytes Absolute 7.22 10*3/mm3      Eosinophils Absolute 12.99 10*3/mm3      Basophils Absolute 23.09 10*3/mm3      Anisocytosis Slight/1+     Microcytes Slight/1+     Polychromasia Slight/1+     WBC Morphology Normal     Large Platelets Slight/1+    Narrative:      Reviewed by Pathologist within the past 30 days on 6.1.22 .    CBC & Differential [[id number]]  (Abnormal) Collected: 06/04/22 0257    Specimen: Blood Updated: 06/04/22 0515    Narrative:      The following orders were created for panel order CBC & Differential.  Procedure                               Abnormality         Status                     ---------                               -----------         ------                     CBC Auto Differential[[id number]]        Abnormal            Final result               Scan Slide[[id number]]                                       Final result                 Please view results for these tests on the individual orders.    Scan Slide [[id number]] Collected: 06/04/22 0257    Specimen: Blood Updated: 06/04/22 0515     Scan Slide --     Comment: See Manual Differential Results       CBC Auto Differential [[id number]]  (Abnormal) Collected: 06/04/22 0257    Specimen: Blood Updated: 06/04/22 0515     .30 10*3/mm3      RBC 3.38 10*6/mm3      Hemoglobin 8.1 g/dL      Hematocrit 25.5 %      MCV 75.5 fL      MCH 24.1 pg      MCHC 31.9 g/dL      RDW 17.4 %      RDW-SD 45.9 fl      MPV 7.6 fL      Platelets 339 10*3/mm3     Narrative:      The previously reported component NRBC is no longer being reported. Previous result was 0.1 /100 WBC (Reference Range: 0.0-0.2 /100 WBC) on  6/4/2022 at 0407 EDT.    Phosphorus [[id number]]  (Normal) Collected: 06/04/22 0257    Specimen: Blood Updated: 06/04/22 0410     Phosphorus 3.2 mg/dL     Magnesium [[id number]]  (Normal) Collected: 06/04/22 0257    Specimen: Blood Updated: 06/04/22 0410     Magnesium 2.3 mg/dL      Comment: Result checked        Basic Metabolic Panel [[id number]]  (Abnormal) Collected: 06/04/22 0257    Specimen: Blood Updated: 06/04/22 0405     Glucose 187 mg/dL      BUN 11 mg/dL      Creatinine 0.51 mg/dL      Sodium 137 mmol/L      Potassium 4.0 mmol/L      Chloride 104 mmol/L      CO2 22.0 mmol/L      Calcium 8.6 mg/dL      BUN/Creatinine Ratio 21.6     Anion Gap 11.0 mmol/L      eGFR 116.8 mL/min/1.73      Comment: National Kidney Foundation and American Society of Nephrology (ASN) Task Force recommended calculation based on the Chronic Kidney Disease Epidemiology Collaboration (CKD-EPI) equation refit without adjustment for race.       Narrative:      GFR Normal >60  Chronic Kidney Disease <60  Kidney Failure <15      C-reactive Protein [[id number]]  (Abnormal) Collected: 06/04/22 0257    Specimen: Blood Updated: 06/04/22 0405     C-Reactive Protein 1.48 mg/dL     POC Glucose Once [[id number]]  (Abnormal) Collected: 06/04/22 0036    Specimen: Blood Updated: 06/04/22 0037     Glucose 269 mg/dL      Comment: Serial Number: [id number]:  [id number]       POC Glucose Once [[id number]]  (Abnormal) Collected: 06/03/22 2047    Specimen: Blood Updated: 06/03/22 2048     Glucose 363 mg/dL      Comment: Serial Number: [id number]:  [id number]       POC Glucose Once [[id number]]  (Abnormal) Collected: 06/03/22 1552    Specimen: Blood Updated: 06/03/22 1553     Glucose 334 mg/dL      Comment: Serial Number: [id number]:  [id number]             IMAGING REVIEWED:  No radiology results for the last day    Assessment & Plan       This is a 46-year-old female with past history of CML presenting from oncology office with cough and  shortness of breath as well as noncompliance issues with Tasigna and Hydrea who was found to have a decreasing hemoglobin to 6.5 and received 1 unit of PRBCs, and WBC of 203.3, possible pulmonary edema on chest x-ray, and glucose of 533. The patient has received blood transfusions for decreasing Hgb level with no active bleed on CT scan, Xarelto is on hold.  Oncology is consulted due to being established patient of Dr Lerma with CML, noncompliance with Tasigna regimen.        CML/chronic pain  Hyperleukocytosis  Noncompliance with medicine  Plan to begin hydroxyurea 1 g twice a day and monitor her CBCs  Verify with pharmacist  Bone marrow December 2018 reveals chronic phase CML  Noncompliance with Tasigna since February 2022  Path consult shows severe leukocytosis with left shift and abundant circulating blasts, microcytic anemia  Continue Hydrea with improvement in WBC down to 144 no dosage change needed.     Severe anemia  Received 1 unit of packed red blood cell for significant anemia   microcytic, hypochromic  CT abdomen/pelvis- no signs of bleed     Will hold Xarelto for now.     Shortness of breath  Discussed with hospitalist  Patient is tachycardiac as well. Will order CT chest PE      Other chronic conditions: Diabetes mellitus type 2-uncontrolled, noncompliance with insulin; depression with anxiety, tachycardia

## 2022-06-04 NOTE — PLAN OF CARE
Goal Outcome Evaluation:            No complaints through the night, denied any pain. Oxygen saturation noted to be running between 88-89 without oxygen while patient is asleep. Placed oxygen at 1L per nasal cannula, O2sat running in the mid 90s. Able to make her needs known. Call light is within reach.

## 2022-06-05 ENCOUNTER — APPOINTMENT (OUTPATIENT)
Dept: CARDIOLOGY | Facility: HOSPITAL | Age: 47
End: 2022-06-05

## 2022-06-05 PROBLEM — E44.0 MODERATE MALNUTRITION: Status: ACTIVE | Noted: 2022-06-05

## 2022-06-05 PROBLEM — J18.9 ATYPICAL PNEUMONIA: Status: ACTIVE | Noted: 2022-06-01

## 2022-06-05 LAB
ANION GAP SERPL CALCULATED.3IONS-SCNC: 10 MMOL/L (ref 5–15)
ANISOCYTOSIS BLD QL: ABNORMAL
BASOPHILS # BLD MANUAL: 13.57 10*3/MM3 (ref 0–0.2)
BASOPHILS NFR BLD MANUAL: 11 % (ref 0–1.5)
BH BB BLOOD EXPIRATION DATE: NORMAL
BH BB BLOOD TYPE BARCODE: 5100
BH BB BLOOD TYPE BARCODE: 5100
BH BB BLOOD TYPE BARCODE: 9500
BH BB DISPENSE STATUS: NORMAL
BH BB PRODUCT CODE: NORMAL
BH BB UNIT NUMBER: NORMAL
BH CV ECHO MEAS - ACS: 2.02 CM
BH CV ECHO MEAS - AO MAX PG: 8.4 MMHG
BH CV ECHO MEAS - AO MEAN PG: 4.4 MMHG
BH CV ECHO MEAS - AO ROOT DIAM: 2.9 CM
BH CV ECHO MEAS - AO V2 MAX: 144.5 CM/SEC
BH CV ECHO MEAS - AO V2 VTI: 22.1 CM
BH CV ECHO MEAS - AVA(I,D): 2.15 CM2
BH CV ECHO MEAS - EDV(CUBED): 144.3 ML
BH CV ECHO MEAS - EDV(MOD-SP2): 97.3 ML
BH CV ECHO MEAS - EDV(MOD-SP4): 93.3 ML
BH CV ECHO MEAS - EF(MOD-BP): 58 %
BH CV ECHO MEAS - EF(MOD-SP2): 59.8 %
BH CV ECHO MEAS - EF(MOD-SP4): 56.4 %
BH CV ECHO MEAS - ESV(CUBED): 51.7 ML
BH CV ECHO MEAS - ESV(MOD-SP2): 39.1 ML
BH CV ECHO MEAS - ESV(MOD-SP4): 40.6 ML
BH CV ECHO MEAS - FS: 29 %
BH CV ECHO MEAS - IVS/LVPW: 0.83 CM
BH CV ECHO MEAS - IVSD: 0.92 CM
BH CV ECHO MEAS - LA DIMENSION: 4.2 CM
BH CV ECHO MEAS - LV DIASTOLIC VOL/BSA (35-75): 58.2 CM2
BH CV ECHO MEAS - LV MASS(C)D: 202.3 GRAMS
BH CV ECHO MEAS - LV MAX PG: 5.5 MMHG
BH CV ECHO MEAS - LV MEAN PG: 2.42 MMHG
BH CV ECHO MEAS - LV SYSTOLIC VOL/BSA (12-30): 25.4 CM2
BH CV ECHO MEAS - LV V1 MAX: 116.9 CM/SEC
BH CV ECHO MEAS - LV V1 VTI: 18.6 CM
BH CV ECHO MEAS - LVIDD: 5.2 CM
BH CV ECHO MEAS - LVIDS: 3.7 CM
BH CV ECHO MEAS - LVOT AREA: 2.5 CM2
BH CV ECHO MEAS - LVOT DIAM: 1.8 CM
BH CV ECHO MEAS - LVPWD: 1.12 CM
BH CV ECHO MEAS - MR MAX PG: 47.1 MMHG
BH CV ECHO MEAS - MR MAX VEL: 343.3 CM/SEC
BH CV ECHO MEAS - MV E MAX VEL: 149.1 CM/SEC
BH CV ECHO MEAS - MV MAX PG: 13.7 MMHG
BH CV ECHO MEAS - MV MEAN PG: 6.2 MMHG
BH CV ECHO MEAS - MV V2 VTI: 28.9 CM
BH CV ECHO MEAS - MVA(VTI): 1.64 CM2
BH CV ECHO MEAS - PA ACC TIME: 0.08 SEC
BH CV ECHO MEAS - PA PR(ACCEL): 44.7 MMHG
BH CV ECHO MEAS - PA V2 MAX: 119.4 CM/SEC
BH CV ECHO MEAS - PULM A REVS DUR: 0.1 SEC
BH CV ECHO MEAS - PULM A REVS VEL: 40.6 CM/SEC
BH CV ECHO MEAS - PULM DIAS VEL: 71.2 CM/SEC
BH CV ECHO MEAS - PULM S/D: 1.23
BH CV ECHO MEAS - PULM SYS VEL: 87.7 CM/SEC
BH CV ECHO MEAS - QP/QS: 3.6
BH CV ECHO MEAS - RAP SYSTOLE: 3 MMHG
BH CV ECHO MEAS - RV MAX PG: 4.5 MMHG
BH CV ECHO MEAS - RV V1 MAX: 106.1 CM/SEC
BH CV ECHO MEAS - RV V1 VTI: 18.7 CM
BH CV ECHO MEAS - RVDD: 2.11 CM
BH CV ECHO MEAS - RVOT DIAM: 3.4 CM
BH CV ECHO MEAS - RVSP: 46.6 MMHG
BH CV ECHO MEAS - SI(MOD-SP2): 36.4 ML/M2
BH CV ECHO MEAS - SI(MOD-SP4): 32.9 ML/M2
BH CV ECHO MEAS - SV(LVOT): 47.4 ML
BH CV ECHO MEAS - SV(MOD-SP2): 58.2 ML
BH CV ECHO MEAS - SV(MOD-SP4): 52.6 ML
BH CV ECHO MEAS - SV(RVOT): 169.4 ML
BH CV ECHO MEAS - TR MAX PG: 43.6 MMHG
BH CV ECHO MEAS - TR MAX VEL: 330.1 CM/SEC
BLASTS NFR BLD MANUAL: 9 % (ref 0–0)
BUN SERPL-MCNC: 8 MG/DL (ref 6–20)
BUN/CREAT SERPL: 15.7 (ref 7–25)
CALCIUM SPEC-SCNC: 8.4 MG/DL (ref 8.6–10.5)
CHLORIDE SERPL-SCNC: 100 MMOL/L (ref 98–107)
CO2 SERPL-SCNC: 20 MMOL/L (ref 22–29)
CREAT SERPL-MCNC: 0.51 MG/DL (ref 0.57–1)
CROSSMATCH INTERPRETATION: NORMAL
CRP SERPL-MCNC: 2.03 MG/DL (ref 0–0.5)
D-LACTATE SERPL-SCNC: 1.3 MMOL/L (ref 0.5–2)
DEPRECATED RDW RBC AUTO: 49.4 FL (ref 37–54)
EGFRCR SERPLBLD CKD-EPI 2021: 116.8 ML/MIN/1.73
EOSINOPHIL # BLD MANUAL: 9.87 10*3/MM3 (ref 0–0.4)
EOSINOPHIL NFR BLD MANUAL: 8 % (ref 0.3–6.2)
ERYTHROCYTE [DISTWIDTH] IN BLOOD BY AUTOMATED COUNT: 18.3 % (ref 12.3–15.4)
GLUCOSE BLDC GLUCOMTR-MCNC: 301 MG/DL (ref 70–105)
GLUCOSE BLDC GLUCOMTR-MCNC: 373 MG/DL (ref 70–105)
GLUCOSE BLDC GLUCOMTR-MCNC: 381 MG/DL (ref 70–105)
GLUCOSE BLDC GLUCOMTR-MCNC: 386 MG/DL (ref 70–105)
GLUCOSE SERPL-MCNC: 372 MG/DL (ref 65–99)
HCT VFR BLD AUTO: 24.8 % (ref 34–46.6)
HGB BLD-MCNC: 7.7 G/DL (ref 12–15.9)
LARGE PLATELETS: ABNORMAL
LYMPHOCYTES # BLD MANUAL: 7.4 10*3/MM3 (ref 0.7–3.1)
LYMPHOCYTES NFR BLD MANUAL: 3 % (ref 5–12)
MAGNESIUM SERPL-MCNC: 1.6 MG/DL (ref 1.6–2.6)
MAXIMAL PREDICTED HEART RATE: 174 BPM
MCH RBC QN AUTO: 24.1 PG (ref 26.6–33)
MCHC RBC AUTO-ENTMCNC: 31.1 G/DL (ref 31.5–35.7)
MCV RBC AUTO: 77.4 FL (ref 79–97)
METAMYELOCYTES NFR BLD MANUAL: 5 % (ref 0–0)
MONOCYTES # BLD: 3.7 10*3/MM3 (ref 0.1–0.9)
MYELOCYTES NFR BLD MANUAL: 3 % (ref 0–0)
NEUTROPHILS # BLD AUTO: 67.87 10*3/MM3 (ref 1.7–7)
NEUTROPHILS NFR BLD MANUAL: 42 % (ref 42.7–76)
NEUTS BAND NFR BLD MANUAL: 13 % (ref 0–5)
NT-PROBNP SERPL-MCNC: 6007 PG/ML (ref 0–450)
PHOSPHATE SERPL-MCNC: 3 MG/DL (ref 2.5–4.5)
PLATELET # BLD AUTO: 312 10*3/MM3 (ref 140–450)
PMV BLD AUTO: 8 FL (ref 6–12)
POTASSIUM SERPL-SCNC: 3.9 MMOL/L (ref 3.5–5.2)
RBC # BLD AUTO: 3.21 10*6/MM3 (ref 3.77–5.28)
SCAN SLIDE: NORMAL
SODIUM SERPL-SCNC: 130 MMOL/L (ref 136–145)
STRESS TARGET HR: 148 BPM
UNIT  ABO: NORMAL
UNIT  RH: NORMAL
VARIANT LYMPHS NFR BLD MANUAL: 1 % (ref 0–5)
VARIANT LYMPHS NFR BLD MANUAL: 5 % (ref 19.6–45.3)
WBC MORPH BLD: NORMAL
WBC NRBC COR # BLD: 123.4 10*3/MM3 (ref 3.4–10.8)

## 2022-06-05 PROCEDURE — 85007 BL SMEAR W/DIFF WBC COUNT: CPT | Performed by: INTERNAL MEDICINE

## 2022-06-05 PROCEDURE — 84100 ASSAY OF PHOSPHORUS: CPT | Performed by: INTERNAL MEDICINE

## 2022-06-05 PROCEDURE — 83880 ASSAY OF NATRIURETIC PEPTIDE: CPT | Performed by: INTERNAL MEDICINE

## 2022-06-05 PROCEDURE — 25010000002 ONDANSETRON PER 1 MG

## 2022-06-05 PROCEDURE — 82962 GLUCOSE BLOOD TEST: CPT

## 2022-06-05 PROCEDURE — 80048 BASIC METABOLIC PNL TOTAL CA: CPT | Performed by: INTERNAL MEDICINE

## 2022-06-05 PROCEDURE — 25010000002 FUROSEMIDE PER 20 MG: Performed by: INTERNAL MEDICINE

## 2022-06-05 PROCEDURE — 63710000001 INSULIN LISPRO (HUMAN) PER 5 UNITS

## 2022-06-05 PROCEDURE — 83605 ASSAY OF LACTIC ACID: CPT | Performed by: INTERNAL MEDICINE

## 2022-06-05 PROCEDURE — 87040 BLOOD CULTURE FOR BACTERIA: CPT | Performed by: INTERNAL MEDICINE

## 2022-06-05 PROCEDURE — 99232 SBSQ HOSP IP/OBS MODERATE 35: CPT | Performed by: INTERNAL MEDICINE

## 2022-06-05 PROCEDURE — 25010000002 CEFTRIAXONE PER 250 MG: Performed by: INTERNAL MEDICINE

## 2022-06-05 PROCEDURE — 93306 TTE W/DOPPLER COMPLETE: CPT | Performed by: INTERNAL MEDICINE

## 2022-06-05 PROCEDURE — 93306 TTE W/DOPPLER COMPLETE: CPT

## 2022-06-05 PROCEDURE — 83735 ASSAY OF MAGNESIUM: CPT | Performed by: INTERNAL MEDICINE

## 2022-06-05 PROCEDURE — 86140 C-REACTIVE PROTEIN: CPT | Performed by: INTERNAL MEDICINE

## 2022-06-05 PROCEDURE — 99233 SBSQ HOSP IP/OBS HIGH 50: CPT | Performed by: INTERNAL MEDICINE

## 2022-06-05 PROCEDURE — 85025 COMPLETE CBC W/AUTO DIFF WBC: CPT | Performed by: INTERNAL MEDICINE

## 2022-06-05 RX ORDER — FUROSEMIDE 10 MG/ML
40 INJECTION INTRAMUSCULAR; INTRAVENOUS DAILY
Status: DISCONTINUED | OUTPATIENT
Start: 2022-06-05 | End: 2022-06-10 | Stop reason: HOSPADM

## 2022-06-05 RX ADMIN — OXYCODONE 10 MG: 5 TABLET ORAL at 11:39

## 2022-06-05 RX ADMIN — FUROSEMIDE 40 MG: 10 INJECTION, SOLUTION INTRAMUSCULAR; INTRAVENOUS at 11:40

## 2022-06-05 RX ADMIN — HYDROXYUREA 1000 MG: 500 CAPSULE ORAL at 20:22

## 2022-06-05 RX ADMIN — ACETAMINOPHEN 650 MG: 325 TABLET ORAL at 11:39

## 2022-06-05 RX ADMIN — ALPRAZOLAM 0.25 MG: 0.25 TABLET ORAL at 16:15

## 2022-06-05 RX ADMIN — ACETAZOLAMIDE 250 MG: 250 TABLET ORAL at 09:13

## 2022-06-05 RX ADMIN — ONDANSETRON 4 MG: 2 INJECTION INTRAMUSCULAR; INTRAVENOUS at 16:11

## 2022-06-05 RX ADMIN — CEFTRIAXONE 2 G: 2 INJECTION, POWDER, FOR SOLUTION INTRAMUSCULAR; INTRAVENOUS at 15:13

## 2022-06-05 RX ADMIN — GABAPENTIN 300 MG: 300 CAPSULE ORAL at 15:13

## 2022-06-05 RX ADMIN — OXYCODONE 10 MG: 5 TABLET ORAL at 05:17

## 2022-06-05 RX ADMIN — INSULIN LISPRO 12 UNITS: 100 INJECTION, SOLUTION INTRAVENOUS; SUBCUTANEOUS at 11:40

## 2022-06-05 RX ADMIN — GABAPENTIN 300 MG: 300 CAPSULE ORAL at 20:22

## 2022-06-05 RX ADMIN — METOPROLOL SUCCINATE 25 MG: 25 TABLET, FILM COATED, EXTENDED RELEASE ORAL at 09:13

## 2022-06-05 RX ADMIN — HYDROXYUREA 1000 MG: 500 CAPSULE ORAL at 09:13

## 2022-06-05 RX ADMIN — GABAPENTIN 300 MG: 300 CAPSULE ORAL at 09:13

## 2022-06-05 RX ADMIN — INSULIN LISPRO 12 UNITS: 100 INJECTION, SOLUTION INTRAVENOUS; SUBCUTANEOUS at 09:13

## 2022-06-05 RX ADMIN — INSULIN LISPRO 10 UNITS: 100 INJECTION, SOLUTION INTRAVENOUS; SUBCUTANEOUS at 17:54

## 2022-06-05 RX ADMIN — SODIUM CHLORIDE, POTASSIUM CHLORIDE, SODIUM LACTATE AND CALCIUM CHLORIDE 125 ML/HR: 600; 310; 30; 20 INJECTION, SOLUTION INTRAVENOUS at 03:53

## 2022-06-05 RX ADMIN — CITALOPRAM HYDROBROMIDE 20 MG: 20 TABLET ORAL at 09:13

## 2022-06-05 RX ADMIN — Medication 10 ML: at 20:22

## 2022-06-05 RX ADMIN — TRAZODONE HYDROCHLORIDE 50 MG: 50 TABLET ORAL at 20:22

## 2022-06-05 NOTE — CONSULTS
Group: [facility]         CONSULT NOTE    Patient Identification:  Nieves Mc  46 y.o.  female  1975  [id number]            Requesting physician: Attending physician    Reason for Consultation: Shortness of breath      History of Present Illness:  46 y.o. female with a past medical history of CML, diabetes mellitus type 2, diabetic neuropathy, severe anemia, dyslipidemia, depression with anxiety, and medical noncompliance.  She presented to [facility] on 6/1/2022 from her oncologist's office  with complaints of cough for over a month, and SOB.  Patient also has a history of progressive fatigue but denies bleeding.    She denies fevers, chills, nausea or vomiting.  She complains of left upper abdominal discomfort at the site of her splenic enlargement.  She was sent to the ER due to tachycardia, significant anemia possible pneumonia.    Today she is complaining of shortness of breath which gets worse with any exertion.  She is afebrile and requiring 2 L of oxygen per nasal cannula.    Assessment:  CML (chronic myelocytic leukemia) (HCC)  Severe extensive bilateral groundglass opacities: Differential diagnosis Transfusion-related acute lung injury (TRALI), transfusion-associated circulatory overload (TACO), atypical pneumonia, ARDS and/or pulmonary edema: CT scan 6/4/2022 negative for PE  Hypoxemia  CML with severe leukocytosis: WBCs 123.4  Severe anemia  Diabetes mellitus type 2 with hyperglycemia  Mild hyponatremia  History of depression and anxiety    Results for orders placed during the hospital encounter of 01/12/22    Adult Transthoracic Echo Limited W/ Cont if Necessary Per Protocol    Interpretation Summary  · Left ventricular ejection fraction appears to be 61 - 65%.  · There is calcification of the aortic valve.  · No pericardial effusion noted  · Color and Doppler studies across the valves were not performed appropriately and hence study may have to be repeated at a different  time for assessing aortic valve disease      Recommendations:  Oxygen supplement and titration maintain saturation 90 to 95%: Currently requiring 2 L of oxygen  The patient history is not clear, she reports chronic shortness of breath for a month that got worse recently but she is not sure about the correlation with transfusion, TRALI normally happens within 6 hours of transfusion so this diagnosis is not clear at this point: We will give Diuresis since she has elevated JVD and check her BMP  Plan bronchoscopy tomorrow to rule out opportunistic infections  Check echocardiogram  Blood pressure control  Glucose control          Review of Sytems:  Review of Systems  Constitutional: Negative for chills, fever and malaise/fatigue.   HENT: Negative.    Eyes: Negative.    Cardiovascular: Negative.    Respiratory: Positive for cough and shortness of breath.    Skin: Negative.    Musculoskeletal: Negative.    Gastrointestinal: Negative.    Genitourinary: Negative.    Neurological: Negative.    Psychiatric/Behavioral: Negative.  Past Medical History:  Past Medical History:   Diagnosis Date   • Bone pain    • Extremity pain     Carlin. legs pain   • Leg pain     left leg greater   • Migraine    • Pulmonary embolism (HCC)    • Vision loss     doing surgery       Past Surgical History:  Past Surgical History:   Procedure Laterality Date   • BONE MARROW BIOPSY     • BREAST SURGERY     •  SECTION     • CHOLECYSTECTOMY     • EYE SURGERY      laser surgery due  to hemmorage--- 2021-- another surgery  lt eye11/15/21   • RETINAL DETACHMENT SURGERY     • SPINE SURGERY      Lombardi spinal block   • TUBAL ABDOMINAL LIGATION          Home Meds:  Medications Prior to Admission   Medication Sig Dispense Refill Last Dose   • acetaZOLAMIDE (DIAMOX) 250 MG tablet Take 250 mg by mouth Daily.   Past Week at Unknown time   • ALPRAZolam (Xanax) 0.25 MG tablet Take 1 tablet by mouth At Night As Needed for Anxiety. 30 tablet 0 Past Week  at Unknown time   • citalopram (CeleXA) 10 MG tablet Take 1 tablet by mouth Daily. To begin 1/31/22 30 tablet 1 6/1/2022 at Unknown time   • gabapentin (NEURONTIN) 300 MG capsule Take 1 capsule by mouth 3 (Three) Times a Day. 90 capsule 0 6/1/2022 at Unknown time   • metoprolol succinate XL (TOPROL-XL) 25 MG 24 hr tablet Take 25 mg by mouth Daily.   6/1/2022 at Unknown time   • oxyCODONE-acetaminophen (PERCOCET)  MG per tablet Take 1 tablet by mouth Every 8 (Eight) Hours As Needed for Moderate Pain . 90 tablet 0 6/1/2022 at Unknown time   • rivaroxaban (XARELTO) 20 MG tablet Take 1 tablet by mouth Daily. 90 tablet 0 6/1/2022 at Unknown time   • traZODone (DESYREL) 50 MG tablet Take 50 mg by mouth Every Night.   6/1/2022 at Unknown time   • Insulin Glargine-Lixisenatide (SOLIQUA) 100-33 UNT-MCG/ML solution pen-injector injection Inject 25 Units under the skin into the appropriate area as directed Daily. (Patient not taking: Reported on 6/3/2022)   Not Taking at Unknown time   • Insulin Lispro, 1 Unit Dial, (HumaLOG KwikPen) 100 UNIT/ML solution pen-injector Inject 1 Units under the skin into the appropriate area as directed 3 (Three) Times a Day Before Meals. Per sliding scale: 151-200 4 units, 201-250 6 units, 251-300 8 units with max dose of 45 units      • nilotinib (TASIGNA) 150 MG capsule capsule Take 150 mg by mouth 2 (Two) Times a Day Before Meals.          Allergies:  Allergies   Allergen Reactions   • Hydromorphone GI Intolerance     dilaudid   • Morphine Nausea And Vomiting   • Propofol Hives       Social History:   Social History     Socioeconomic History   • Marital status:    Tobacco Use   • Smoking status: Never Smoker   • Smokeless tobacco: Never Used   Vaping Use   • Vaping Use: Never used   Substance and Sexual Activity   • Alcohol use: No   • Drug use: No   • Sexual activity: Defer       Family History:  Family History   Problem Relation Age of Onset   • Diabetes Mother    • Diabetes  "Maternal Grandmother    • Heart attack Maternal Grandmother    • Stroke Maternal Grandmother        Physical Exam:  /83   Pulse 118   Temp 98.3 °F (36.8 °C) (Oral)   Resp 18   Ht 162.6 cm (64\")   Wt 56.7 kg (125 lb)   SpO2 91%   BMI 21.46 kg/m²  Body mass index is 21.46 kg/m². 91% 56.7 kg (125 lb)  Physical Exam  General Appearance:  Alert   HEENT:  Normocephalic, without obvious abnormality, Conjunctiva/corneas clear,.   Nares normal, no drainage     Neck:  Supple, symmetrical, trachea midline. + JVD.  Lungs /Chest wall:   Bilateral  rhonchi, respirations unlabored, symmetrical wall movement.     Heart:  Regular rate and rhythm, S1 S2 normal  Abdomen: Soft, non-tender, no masses, no organomegaly.    Extremities: No edema, no clubbing or cyanosis  LABS:  Lab Results   Component Value Date    CALCIUM 8.4 (L) 06/05/2022    PHOS 3.0 06/05/2022     Results from last 7 days   Lab Units 06/05/22  0403 06/04/22  0257 06/03/22  0520 06/02/22  0747 06/01/22 2037   MAGNESIUM mg/dL 1.6 2.3 1.5*  --   --    SODIUM mmol/L 130* 137 138   < > 129*   POTASSIUM mmol/L 3.9 4.0 3.9   < > 3.9   CHLORIDE mmol/L 100 104 102   < > 94*   CO2 mmol/L 20.0* 22.0 25.0   < > 24.0   BUN mg/dL 8 11 13   < > 9   CREATININE mg/dL 0.51* 0.51* 0.45*   < > 0.58   GLUCOSE mg/dL 372* 187* 165*   < > 533*   CALCIUM mg/dL 8.4* 8.6 7.9*   < > 8.2*   WBC 10*3/mm3 123.40* 144.30* 173.80*   < > 203.30*   HEMOGLOBIN g/dL 7.7* 8.1* 7.4*   < > 6.5*   PLATELETS 10*3/mm3 312 339 324   < > 363   ALT (SGPT) U/L  --   --   --   --  <5   AST (SGOT) U/L  --   --   --   --  9    < > = values in this interval not displayed.     Lab Results   Component Value Date    CKTOTAL 16 (L) 01/13/2022    TROPONINI <0.03 10/11/2018    TROPONINT <0.010 01/12/2022                         Results from last 7 days   Lab Units 06/01/22 2037   INR  1.27*         Lab Results   Component Value Date    TSH 1.070 01/13/2022     Estimated Creatinine Clearance: 123.4 mL/min (A) " (by C-G formula based on SCr of 0.51 mg/dL (L)).         Imaging:  Imaging Results (Last 24 Hours)     Procedure Component Value Units Date/Time    CT Angiogram Chest Pulmonary Embolism [[id number]] Collected: 06/04/22 1429     Updated: 06/04/22 1436    Narrative:      CT ANGIOGRAM CHEST PULMONARY EMBOLISM-     Date of Exam: 6/4/2022 1:56 PM     Indication: Pulmonary embolism (PE) suspected, unknown D-dimer;  C92.10-Chronic myeloid leukemia, BCR/ABL-positive, not having achieved  remission; D64.9-Anemia, unspecified; R10.84-Generalized abdominal pain;  R16.1-Splenomegaly, not elsewhere classified; E10.649-Type 1 diabetes  mellitus with hypoglycemia without coma.     Comparison: CT abdomen and pelvis 6/1/2022. CT angiogram of the chest  1/12/2022     Technique: Serial and axial CT images of the chest were obtained  following the uneventful intravenous administration of contrast.  Reconstructions in the coronal and sagittal planes were also performed.  In addition, a 3 D volume rendered image was obtained after post  processing.  Automated exposure control and iterative reconstruction  methods were used.       FINDINGS:     No evidence of pulmonary embolism. Main pulmonary artery caliber within  normal limits and no definitive evidence of right ventricular strain. No  acute findings throughout the mediastinum. Caliber of the aorta within  normal limits. No dissection. No pericardial effusion.     Market hepatosplenomegaly again noted.     Small bilateral pleural effusions are present, smaller than on the  comparison CT. There is extensive groundglass airspace disease  throughout both lungs. No pneumothorax.       Impression:      Severe extensive groundglass airspace disease throughout both lungs.  This may relate to edema or diffuse infection. ARDS in the differential.     Bilateral pleural effusions, very small size. These are smaller in size  than the comparison chest CT.     No evidence of pulmonary embolism.      Marked hepatomegaly. Normal-sized lymph nodes, no definitive adenopathy  in the chest.           Electronically Signed By-Joey Vang On:6/4/2022 2:34 PM  This report was finalized on [id number] by  Joey Vang, .            Current Meds:   SCHEDULE  acetaZOLAMIDE, 250 mg, Oral, Daily  citalopram, 20 mg, Oral, Daily  gabapentin, 300 mg, Oral, TID  hydroxyurea, 1,000 mg, Oral, BID  insulin lispro, 0-14 Units, Subcutaneous, TID AC  metoprolol succinate XL, 25 mg, Oral, Daily  sodium chloride, 10 mL, Intravenous, Q12H  traZODone, 50 mg, Oral, Nightly      Infusions  lactated ringers, 125 mL/hr, Last Rate: 125 mL/hr (06/05/22 0353)      PRNs  •  acetaminophen **OR** acetaminophen **OR** acetaminophen  •  ALPRAZolam  •  aluminum-magnesium hydroxide-simethicone  •  dextrose  •  dextrose  •  glucagon (human recombinant)  •  insulin lispro **AND** insulin lispro  •  magnesium sulfate **OR** magnesium sulfate **OR** magnesium sulfate  •  melatonin  •  nitroglycerin  •  ondansetron **OR** ondansetron  •  oxyCODONE  •  [COMPLETED] Insert peripheral IV **AND** sodium chloride  •  sodium chloride        Sobia Flannery MD  6/5/2022  08:53 EDT      Much of this encounter note is an electronic transcription/translation of spoken language to printed text using Dragon Software.

## 2022-06-05 NOTE — PROGRESS NOTES
[facility] Daily Progress Note    Patient Name: Nieves Mc  : 1975  MRN: [id number]  Primary Care Physician:  Houston Oro MD  Date of admission: 2022      Subjective      Chief Complaint: Shortness of breath        Patient Reports she feels terrible today.  She is emotional crying.  Heart rate is 120s.  She still feels short of breath especially on exertion.  CT angiogram PE done but did not show any thromboembolus.  He did showe bilateral opacities however consistent with pneumonia possibly ARDS possible transfusion reaction.  Pulmonary consulted.  Appreciate assistance.     ROS negative except as above    Objective      Vitals:   Temp:  [98 °F (36.7 °C)-100.3 °F (37.9 °C)] 100.3 °F (37.9 °C)  Heart Rate:  [118-120] 120  Resp:  [18-33] 33  BP: (137-172)/(77-97) 172/97  Flow (L/min):  [2] 2    Physical Exam  Vitals reviewed.   Constitutional:       Comments: On 2 L today    HENT:      Head: Normocephalic.      Nose: Nose normal.   Cardiovascular:      Rate and Rhythm: Regular rhythm.  Significant tachycardia present.      Pulses: Normal pulses.   Pulmonary:      Effort: Pulmonary effort is normal.   Abdominal:      General: There is ascites.      Comments: Significant splenomegaly   Musculoskeletal:         General: Normal range of motion.      Cervical back: Normal range of motion.   Skin:     General: Skin is warm.      Capillary Refill: Capillary refill takes less than 2 seconds.   Neurological:      General: No focal deficit present.   Psychiatric:         Mood and Affect: Mood normal.        Result Review    Result Review:  I have personally reviewed the results from the time of this admission to 2022 16:50 EDT and agree with these findings:  [x]  Laboratory  []  Microbiology  []  Radiology  []  EKG/Telemetry   []  Cardiology/Vascular   []  Pathology  []  Old records  []  Other:  Most notable findings include: Hemoglobin 7.7.    today           Assessment & Plan    46-year-old lady on chemotherapy presents with sepsis pneumonia shortness of breath tachycardia anemia     Active Hospital Problems:          Active Hospital Problems     Diagnosis     • **CML (chronic myelocytic leukemia) (HCC)     • Tachycardia     • Severe anemia     • Medically noncompliant     • Depression with anxiety        Plan:       CML with associated chronic pain  -WBCs improving  -Per oncology note, currently in chronic phase based on her bone marrow  -She admits noncompliance with treatment (tasigna 300 mg p.o. twice daily) since February 2022.  -Continue home Percocet  -Oncology consulted appreciate assistance     Severe anemia  -Hemoglobin stable today continue to monitor  -CT of the abdomen and pelvis shows no sign of GI bleed  -Monitor H&H  -Type and cross  -1 unit PRBC ordered for transfusion in ED  -Hold home Xarelto for now  -Another unit given June 2  Hemoglobin above 7.5 continue to monitor closely     Diabetes mellitus type 2  -Hemoglobin A1c ordered  -Patient also reports noncompliance with insulin  -Blood glucose 533 on arrival to ED decreased to 477 after receiving 8 units of regular insulin  -Accu-Cheks AC at bedtime  -Continue home NPH  -Continue home gabapentin  -SSI ordered     Depression with anxiety  -Chronic, stable  -Continue home alprazolam, Celexa, trazodone     Tachycardia  -EKG reviewed  -Continuous cardiac monitoring  -Continue home metoprolol succinate XL  -CT PE negative on June 4     Persistent nausea vomiting  Abdominal x-ray normal  Supportive care with Zofran     CT PE shows bilateral opacities pneumonia with possible ARDS versus opportunistic infection.  Echo pending.  Appreciate pulmonary assistance.  Possible transfusion reaction on admission as her symptoms got worse posttransfusion     DVT prophylaxis:  Mechanical DVT prophylaxis orders are present.     CODE STATUS:    Code Status (Patient has no pulse and is not breathing): CPR  (Attempt to Resuscitate)  Medical Interventions (Patient has pulse or is breathing): Full Support     Admission Status:  I believe this patient meets inpatient status.     I discussed the patient's findings and my recommendations with patient.     This patient has been examined wearing appropriate Personal Protective Equipment   06/05/22      Electronically signed by Villa Patel MD, 06/05/22, 16:50 EDT.  [facility] Hospitalist Team

## 2022-06-05 NOTE — PROGRESS NOTES
Hematology/Oncology Inpatient Progress Note    PATIENT NAME: Nieves Mc  : 1975  MRN: [id number]    Chief complaint: Uncontrolled Leukemia     History of present illness:      Nieves Mc is a 46 y.o. female who presented to [facility] on 2022 with complaints of cough for over a month, and SOB.  Patient also has a history of progressive fatigue but denies bleeding.  There is history of contact with someone with cough.  She denies fevers, chills, nausea or vomiting.  She complains of left upper abdominal discomfort at the site of her splenic enlargement.  She was sent to the ER due to tachycardia, significant anemia possible pneumonia.     22  Hematology/Oncology was consulted as she has a history of CML on to Tasigna twice a day.  Patient has been very noncompliant with her treatment.  She was seen in the office with a white count of more than 200,000, significant anemia with hemoglobin of 7.7 and a history of prolonged cough for the past 1 and half months.  Patient has not been seen in the office since end of 2022.  She has a history of noncompliance with her medications.         She  has a past medical history of Bone pain, Extremity pain, Leg pain, Migraine, Pulmonary embolism (HCC), and Vision loss.     PCP: Houston Oro MD    Subjective    2022: Reports persistent dyspnea, as well as long spells of forceful cough that have triggered nausea at times.  Unable to rest because of the persistent dyspnea.  Unable to eat well.  No vomiting.  No diarrhea or dysuria.    MEDICATIONS:    Scheduled Meds:  acetaZOLAMIDE, 250 mg, Oral, Daily  citalopram, 20 mg, Oral, Daily  furosemide, 40 mg, Intravenous, Daily  gabapentin, 300 mg, Oral, TID  hydroxyurea, 1,000 mg, Oral, BID  insulin lispro, 0-14 Units, Subcutaneous, TID AC  metoprolol succinate XL, 25 mg, Oral, Daily  sodium chloride, 10 mL, Intravenous, Q12H  traZODone, 50 mg, Oral, Nightly       Continuous  "Infusions:      PRN Meds:  •  acetaminophen **OR** acetaminophen **OR** acetaminophen  •  ALPRAZolam  •  aluminum-magnesium hydroxide-simethicone  •  dextrose  •  dextrose  •  glucagon (human recombinant)  •  insulin lispro **AND** insulin lispro  •  magnesium sulfate **OR** magnesium sulfate **OR** magnesium sulfate  •  melatonin  •  nitroglycerin  •  ondansetron **OR** ondansetron  •  oxyCODONE  •  [COMPLETED] Insert peripheral IV **AND** sodium chloride  •  sodium chloride     ALLERGIES:    Allergies   Allergen Reactions   • Hydromorphone GI Intolerance     dilaudid   • Morphine Nausea And Vomiting   • Propofol Hives     A 12 point review of systems was conducted.  All pertinent positives and negatives are documented above.  Objective    VITALS:   /83   Pulse 118   Temp 98.3 °F (36.8 °C) (Oral)   Resp 18   Ht 162.6 cm (64\")   Wt 56.7 kg (125 lb)   SpO2 91%   BMI 21.46 kg/m²     PHYSICAL EXAM: (performed by MD)  Physical Exam  Constitutional:       General: She is in acute distress.      Appearance: She is ill-appearing. She is not toxic-appearing or diaphoretic.   HENT:      Head: Normocephalic and atraumatic.      Right Ear: External ear normal.      Left Ear: External ear normal.      Nose: Nose normal. No congestion or rhinorrhea.      Mouth/Throat:      Mouth: Mucous membranes are moist.      Pharynx: Oropharynx is clear. No oropharyngeal exudate or posterior oropharyngeal erythema.      Comments: Very poor dentition  Eyes:      General:         Right eye: No discharge.         Left eye: No discharge.      Pupils: Pupils are equal, round, and reactive to light.   Cardiovascular:      Rate and Rhythm: Regular rhythm. Tachycardia present.      Pulses: Normal pulses.      Heart sounds: Normal heart sounds. No murmur heard.    No friction rub. No gallop.   Pulmonary:      Effort: No respiratory distress.      Breath sounds: No stridor. Wheezing and rhonchi present.      Comments: Labored.  The lungs " are markedly diminished and there are bilateral fine crackles and wheezes throughout both sides.  Abdominal:      General: There is no distension.      Palpations: Abdomen is soft. There is no mass.      Tenderness: There is no abdominal tenderness.      Comments: Rounded, protuberant.  Soft and nontender.   Musculoskeletal:         General: Normal range of motion.      Cervical back: Normal range of motion and neck supple. No rigidity or tenderness.      Right lower leg: No edema.      Left lower leg: No edema.   Lymphadenopathy:      Cervical: No cervical adenopathy.   Skin:     General: Skin is warm.      Coloration: Skin is pale. Skin is not jaundiced.      Findings: No bruising or erythema.   Neurological:      General: No focal deficit present.      Mental Status: She is alert and oriented to person, place, and time.      Cranial Nerves: No cranial nerve deficit.   Psychiatric:         Mood and Affect: Mood normal.         Behavior: Behavior normal.         Thought Content: Thought content normal.         Judgment: Judgment normal.     I Yifan Truong MD performed a physical exam on 6/5/2022 as documented above    RECENT LABS:  Lab Results (last 24 hours)     Procedure Component Value Units Date/Time    BNP [[id number]] Collected: 06/05/22 0403    Specimen: Blood Updated: 06/05/22 0935    Manual Differential [[id number]]  (Abnormal) Collected: 06/05/22 0403    Specimen: Blood Updated: 06/05/22 0731     Neutrophil % 42.0 %      Lymphocyte % 5.0 %      Monocyte % 3.0 %      Eosinophil % 8.0 %      Basophil % 11.0 %      Bands %  13.0 %      Metamyelocyte % 5.0 %      Myelocyte % 3.0 %      Atypical Lymphocyte % 1.0 %      Blasts % 9.0 %      Neutrophils Absolute 67.87 10*3/mm3      Lymphocytes Absolute 7.40 10*3/mm3      Monocytes Absolute 3.70 10*3/mm3      Eosinophils Absolute 9.87 10*3/mm3      Basophils Absolute 13.57 10*3/mm3      Anisocytosis Slight/1+     WBC Morphology Normal     Large Platelets  Slight/1+    Narrative:      Reviewed by Pathologist within the past 30 days on 06.02.2022.      CBC & Differential [[id number]]  (Abnormal) Collected: 06/05/22 0403    Specimen: Blood Updated: 06/05/22 0731    Narrative:      The following orders were created for panel order CBC & Differential.  Procedure                               Abnormality         Status                     ---------                               -----------         ------                     CBC Auto Differential[[id number]]        Abnormal            Final result               Scan Slide[[id number]]                                       Final result                 Please view results for these tests on the individual orders.    CBC Auto Differential [[id number]]  (Abnormal) Collected: 06/05/22 0403    Specimen: Blood Updated: 06/05/22 0731     .40 10*3/mm3      RBC 3.21 10*6/mm3      Hemoglobin 7.7 g/dL      Hematocrit 24.8 %      MCV 77.4 fL      MCH 24.1 pg      MCHC 31.1 g/dL      RDW 18.3 %      RDW-SD 49.4 fl      MPV 8.0 fL      Platelets 312 10*3/mm3     Narrative:      The previously reported component NRBC is no longer being reported. Previous result was 0.0 /100 WBC (Reference Range: 0.0-0.2 /100 WBC) on 6/5/2022 at 0501 EDT.    Scan Slide [[id number]] Collected: 06/05/22 0403    Specimen: Blood Updated: 06/05/22 0731     Scan Slide --     Comment: See Manual Differential Results       POC Glucose Once [[id number]]  (Abnormal) Collected: 06/05/22 0726    Specimen: Blood Updated: 06/05/22 0727     Glucose 386 mg/dL      Comment: Serial Number: [id number]:  [id number]       Magnesium [[id number]]  (Normal) Collected: 06/05/22 0403    Specimen: Blood Updated: 06/05/22 0522     Magnesium 1.6 mg/dL     Basic Metabolic Panel [[id number]]  (Abnormal) Collected: 06/05/22 0403    Specimen: Blood Updated: 06/05/22 0515     Glucose 372 mg/dL      BUN 8 mg/dL      Creatinine 0.51 mg/dL      Sodium 130 mmol/L      Potassium 3.9 mmol/L       Chloride 100 mmol/L      CO2 20.0 mmol/L      Calcium 8.4 mg/dL      BUN/Creatinine Ratio 15.7     Anion Gap 10.0 mmol/L      eGFR 116.8 mL/min/1.73      Comment: National Kidney Foundation and American Society of Nephrology (ASN) Task Force recommended calculation based on the Chronic Kidney Disease Epidemiology Collaboration (CKD-EPI) equation refit without adjustment for race.       Narrative:      GFR Normal >60  Chronic Kidney Disease <60  Kidney Failure <15      C-reactive Protein [[id number]]  (Abnormal) Collected: 06/05/22 0403    Specimen: Blood Updated: 06/05/22 0515     C-Reactive Protein 2.03 mg/dL     Phosphorus [[id number]]  (Normal) Collected: 06/05/22 0403    Specimen: Blood Updated: 06/05/22 0515     Phosphorus 3.0 mg/dL     POC Glucose Once [[id number]]  (Abnormal) Collected: 06/04/22 2328    Specimen: Blood Updated: 06/04/22 2330     Glucose 350 mg/dL      Comment: Serial Number: [id number]:  [id number]       POC Glucose Once [[id number]]  (Abnormal) Collected: 06/04/22 2144    Specimen: Blood Updated: 06/04/22 2146     Glucose 369 mg/dL      Comment: Serial Number: [id number]:  [id number]       POC Glucose Once [[id number]]  (Abnormal) Collected: 06/04/22 1706    Specimen: Blood Updated: 06/04/22 1707     Glucose 373 mg/dL      Comment: Serial Number: [id number]:  [id number]       POC Glucose Once [[id number]]  (Abnormal) Collected: 06/04/22 1236    Specimen: Blood Updated: 06/04/22 1237     Glucose 240 mg/dL      Comment: Serial Number: [id number]:  [id number]             IMAGING REVIEWED:  CT Angiogram Chest Pulmonary Embolism    Result Date: 6/4/2022  Severe extensive groundglass airspace disease throughout both lungs. This may relate to edema or diffuse infection. ARDS in the differential.  Bilateral pleural effusions, very small size. These are smaller in size than the comparison chest CT.  No evidence of pulmonary embolism.  Marked hepatomegaly. Normal-sized lymph  nodes, no definitive adenopathy in the chest.    Electronically Signed By-Joey Vang On:6/4/2022 2:34 PM This report was finalized on [id number] by  Joey Vang, .      Assessment & Plan     1.  Chronic myelogenous leukemia on hydroxyurea.  The white blood cell count, while still markedly elevated, has come down significantly since the institution of the hydroxyurea.  It has had some impact on the hemoglobin and we will continue to monitor.  Renal function was reviewed and continues to be adequate.  2.  Reviewed the images and the report of this recent scans.  While pulmonary embolism was not documented, there is extensive interstitial infiltrate involving both lungs.  The picture is quite concerning, particularly because her oxygen saturation has been dropping.  I have asked pulmonary medicine to see her soon as possible.  Discussed with her.    Yifan Truong MD on 6/5/2022 at 10:48 AM

## 2022-06-05 NOTE — PLAN OF CARE
Goal Outcome Evaluation:    Patient is able to make needs known. She is requiring O2 at all times now. I gave her a bedside commode to help with her exhaustion. She did have an episode of emesis, gave zofran and xanax after discussing that she hasn't had the xanax and normally takes it regularly at night. She states she does feel better now.

## 2022-06-06 ENCOUNTER — ANESTHESIA (OUTPATIENT)
Dept: GASTROENTEROLOGY | Facility: HOSPITAL | Age: 47
End: 2022-06-06

## 2022-06-06 ENCOUNTER — ANESTHESIA EVENT (OUTPATIENT)
Dept: GASTROENTEROLOGY | Facility: HOSPITAL | Age: 47
End: 2022-06-06

## 2022-06-06 ENCOUNTER — TELEPHONE (OUTPATIENT)
Dept: PAIN MEDICINE | Facility: CLINIC | Age: 47
End: 2022-06-06

## 2022-06-06 LAB
ANION GAP SERPL CALCULATED.3IONS-SCNC: 11 MMOL/L (ref 5–15)
ANISOCYTOSIS BLD QL: ABNORMAL
B PARAPERT DNA SPEC QL NAA+PROBE: NOT DETECTED
B PERT DNA SPEC QL NAA+PROBE: NOT DETECTED
BASOPHILS # BLD MANUAL: 10.68 10*3/MM3 (ref 0–0.2)
BASOPHILS NFR BLD MANUAL: 9 % (ref 0–1.5)
BLASTS NFR BLD MANUAL: 8 % (ref 0–0)
BUN SERPL-MCNC: 9 MG/DL (ref 6–20)
BUN/CREAT SERPL: 18.4 (ref 7–25)
C PNEUM DNA NPH QL NAA+NON-PROBE: NOT DETECTED
CALCIUM SPEC-SCNC: 8.3 MG/DL (ref 8.6–10.5)
CHLORIDE SERPL-SCNC: 99 MMOL/L (ref 98–107)
CO2 SERPL-SCNC: 22 MMOL/L (ref 22–29)
CREAT SERPL-MCNC: 0.49 MG/DL (ref 0.57–1)
CRP SERPL-MCNC: 5.7 MG/DL (ref 0–0.5)
DEPRECATED RDW RBC AUTO: 48.6 FL (ref 37–54)
EGFRCR SERPLBLD CKD-EPI 2021: 117.9 ML/MIN/1.73
EOSINOPHIL # BLD MANUAL: 3.56 10*3/MM3 (ref 0–0.4)
EOSINOPHIL NFR BLD MANUAL: 3 % (ref 0.3–6.2)
ERYTHROCYTE [DISTWIDTH] IN BLOOD BY AUTOMATED COUNT: 18.2 % (ref 12.3–15.4)
FLUAV SUBTYP SPEC NAA+PROBE: NOT DETECTED
FLUBV RNA ISLT QL NAA+PROBE: NOT DETECTED
GLUCOSE BLDC GLUCOMTR-MCNC: 253 MG/DL (ref 70–105)
GLUCOSE BLDC GLUCOMTR-MCNC: 324 MG/DL (ref 70–105)
GLUCOSE BLDC GLUCOMTR-MCNC: 328 MG/DL (ref 70–105)
GLUCOSE BLDC GLUCOMTR-MCNC: 367 MG/DL (ref 70–105)
GLUCOSE BLDC GLUCOMTR-MCNC: 443 MG/DL (ref 70–105)
GLUCOSE SERPL-MCNC: 427 MG/DL (ref 65–99)
HADV DNA SPEC NAA+PROBE: NOT DETECTED
HCOV 229E RNA SPEC QL NAA+PROBE: NOT DETECTED
HCOV HKU1 RNA SPEC QL NAA+PROBE: NOT DETECTED
HCOV NL63 RNA SPEC QL NAA+PROBE: NOT DETECTED
HCOV OC43 RNA SPEC QL NAA+PROBE: NOT DETECTED
HCT VFR BLD AUTO: 26.2 % (ref 34–46.6)
HGB BLD-MCNC: 8 G/DL (ref 12–15.9)
HMPV RNA NPH QL NAA+NON-PROBE: NOT DETECTED
HPIV1 RNA ISLT QL NAA+PROBE: NOT DETECTED
HPIV2 RNA SPEC QL NAA+PROBE: NOT DETECTED
HPIV3 RNA NPH QL NAA+PROBE: NOT DETECTED
HPIV4 P GENE NPH QL NAA+PROBE: NOT DETECTED
LYMPHOCYTES # BLD MANUAL: 21.37 10*3/MM3 (ref 0.7–3.1)
LYMPHOCYTES NFR BLD MANUAL: 7 % (ref 5–12)
M PNEUMO IGG SER IA-ACNC: NOT DETECTED
MAGNESIUM SERPL-MCNC: 1.9 MG/DL (ref 1.6–2.6)
MCH RBC QN AUTO: 23.4 PG (ref 26.6–33)
MCHC RBC AUTO-ENTMCNC: 30.6 G/DL (ref 31.5–35.7)
MCV RBC AUTO: 76.4 FL (ref 79–97)
METAMYELOCYTES NFR BLD MANUAL: 3 % (ref 0–0)
MONOCYTES # BLD: 8.31 10*3/MM3 (ref 0.1–0.9)
MYELOCYTES NFR BLD MANUAL: 1 % (ref 0–0)
NEUTROPHILS # BLD AUTO: 59.35 10*3/MM3 (ref 1.7–7)
NEUTROPHILS NFR BLD MANUAL: 39 % (ref 42.7–76)
NEUTS BAND NFR BLD MANUAL: 11 % (ref 0–5)
PHOSPHATE SERPL-MCNC: 3.8 MG/DL (ref 2.5–4.5)
PLAT MORPH BLD: NORMAL
PLATELET # BLD AUTO: 318 10*3/MM3 (ref 140–450)
PMV BLD AUTO: 7.6 FL (ref 6–12)
POIKILOCYTOSIS BLD QL SMEAR: ABNORMAL
POLYCHROMASIA BLD QL SMEAR: ABNORMAL
POTASSIUM SERPL-SCNC: 3.7 MMOL/L (ref 3.5–5.2)
PROMYELOCYTES NFR BLD MANUAL: 1 % (ref 0–0)
RBC # BLD AUTO: 3.43 10*6/MM3 (ref 3.77–5.28)
RHINOVIRUS RNA SPEC NAA+PROBE: NOT DETECTED
RSV RNA NPH QL NAA+NON-PROBE: NOT DETECTED
SARS-COV-2 RNA NPH QL NAA+NON-PROBE: NOT DETECTED
SARS-COV-2 RNA PNL SPEC NAA+PROBE: NOT DETECTED
SCAN SLIDE: NORMAL
SODIUM SERPL-SCNC: 132 MMOL/L (ref 136–145)
VARIANT LYMPHS NFR BLD MANUAL: 18 % (ref 19.6–45.3)
WBC MORPH BLD: NORMAL
WBC NRBC COR # BLD: 118.7 10*3/MM3 (ref 3.4–10.8)

## 2022-06-06 PROCEDURE — 88108 CYTOPATH CONCENTRATE TECH: CPT | Performed by: INTERNAL MEDICINE

## 2022-06-06 PROCEDURE — 85007 BL SMEAR W/DIFF WBC COUNT: CPT | Performed by: INTERNAL MEDICINE

## 2022-06-06 PROCEDURE — 86140 C-REACTIVE PROTEIN: CPT | Performed by: INTERNAL MEDICINE

## 2022-06-06 PROCEDURE — 87102 FUNGUS ISOLATION CULTURE: CPT | Performed by: INTERNAL MEDICINE

## 2022-06-06 PROCEDURE — 82962 GLUCOSE BLOOD TEST: CPT

## 2022-06-06 PROCEDURE — 86615 BORDETELLA ANTIBODY: CPT

## 2022-06-06 PROCEDURE — 25010000002 CEFTRIAXONE PER 250 MG: Performed by: INTERNAL MEDICINE

## 2022-06-06 PROCEDURE — 63710000001 INSULIN LISPRO (HUMAN) PER 5 UNITS

## 2022-06-06 PROCEDURE — 87635 SARS-COV-2 COVID-19 AMP PRB: CPT | Performed by: INTERNAL MEDICINE

## 2022-06-06 PROCEDURE — 87205 SMEAR GRAM STAIN: CPT | Performed by: INTERNAL MEDICINE

## 2022-06-06 PROCEDURE — 87070 CULTURE OTHR SPECIMN AEROBIC: CPT | Performed by: INTERNAL MEDICINE

## 2022-06-06 PROCEDURE — 0 MAGNESIUM SULFATE 4 GM/100ML SOLUTION: Performed by: INTERNAL MEDICINE

## 2022-06-06 PROCEDURE — 25010000002 DIPHENHYDRAMINE PER 50 MG: Performed by: NURSE ANESTHETIST, CERTIFIED REGISTERED

## 2022-06-06 PROCEDURE — 25010000002 FUROSEMIDE PER 20 MG: Performed by: INTERNAL MEDICINE

## 2022-06-06 PROCEDURE — 85025 COMPLETE CBC W/AUTO DIFF WBC: CPT | Performed by: INTERNAL MEDICINE

## 2022-06-06 PROCEDURE — 0202U NFCT DS 22 TRGT SARS-COV-2: CPT | Performed by: INTERNAL MEDICINE

## 2022-06-06 PROCEDURE — 63710000001 INSULIN GLARGINE PER 5 UNITS: Performed by: INTERNAL MEDICINE

## 2022-06-06 PROCEDURE — 80048 BASIC METABOLIC PNL TOTAL CA: CPT | Performed by: INTERNAL MEDICINE

## 2022-06-06 PROCEDURE — 25010000002 AZITHROMYCIN PER 500 MG: Performed by: INTERNAL MEDICINE

## 2022-06-06 PROCEDURE — 87206 SMEAR FLUORESCENT/ACID STAI: CPT | Performed by: INTERNAL MEDICINE

## 2022-06-06 PROCEDURE — 83735 ASSAY OF MAGNESIUM: CPT | Performed by: INTERNAL MEDICINE

## 2022-06-06 PROCEDURE — 87116 MYCOBACTERIA CULTURE: CPT | Performed by: INTERNAL MEDICINE

## 2022-06-06 PROCEDURE — 87305 ASPERGILLUS AG IA: CPT | Performed by: INTERNAL MEDICINE

## 2022-06-06 PROCEDURE — 0B9M8ZX DRAINAGE OF BILATERAL LUNGS, VIA NATURAL OR ARTIFICIAL OPENING ENDOSCOPIC, DIAGNOSTIC: ICD-10-PCS | Performed by: INTERNAL MEDICINE

## 2022-06-06 PROCEDURE — 25010000002 PROPOFOL 10 MG/ML EMULSION: Performed by: NURSE ANESTHETIST, CERTIFIED REGISTERED

## 2022-06-06 PROCEDURE — 87385 HISTOPLASMA CAPSUL AG IA: CPT | Performed by: INTERNAL MEDICINE

## 2022-06-06 PROCEDURE — 84100 ASSAY OF PHOSPHORUS: CPT | Performed by: INTERNAL MEDICINE

## 2022-06-06 PROCEDURE — 99232 SBSQ HOSP IP/OBS MODERATE 35: CPT | Performed by: INTERNAL MEDICINE

## 2022-06-06 PROCEDURE — 87798 DETECT AGENT NOS DNA AMP: CPT | Performed by: INTERNAL MEDICINE

## 2022-06-06 RX ORDER — LIDOCAINE 50 MG/G
OINTMENT TOPICAL AS NEEDED
Status: DISCONTINUED | OUTPATIENT
Start: 2022-06-06 | End: 2022-06-06 | Stop reason: HOSPADM

## 2022-06-06 RX ORDER — MEPERIDINE HYDROCHLORIDE 25 MG/ML
12.5 INJECTION INTRAMUSCULAR; INTRAVENOUS; SUBCUTANEOUS
Status: DISCONTINUED | OUTPATIENT
Start: 2022-06-06 | End: 2022-06-06 | Stop reason: HOSPADM

## 2022-06-06 RX ORDER — SODIUM CHLORIDE 9 MG/ML
INJECTION, SOLUTION INTRAVENOUS CONTINUOUS PRN
Status: DISCONTINUED | OUTPATIENT
Start: 2022-06-06 | End: 2022-06-06 | Stop reason: SURG

## 2022-06-06 RX ORDER — LORAZEPAM 2 MG/ML
0.5 INJECTION INTRAMUSCULAR
Status: DISCONTINUED | OUTPATIENT
Start: 2022-06-06 | End: 2022-06-06 | Stop reason: HOSPADM

## 2022-06-06 RX ORDER — ACETAMINOPHEN 650 MG/1
650 SUPPOSITORY RECTAL ONCE AS NEEDED
Status: DISCONTINUED | OUTPATIENT
Start: 2022-06-06 | End: 2022-06-06

## 2022-06-06 RX ORDER — ONDANSETRON 2 MG/ML
4 INJECTION INTRAMUSCULAR; INTRAVENOUS ONCE AS NEEDED
Status: DISCONTINUED | OUTPATIENT
Start: 2022-06-06 | End: 2022-06-06 | Stop reason: HOSPADM

## 2022-06-06 RX ORDER — DIPHENHYDRAMINE HYDROCHLORIDE 50 MG/ML
INJECTION INTRAMUSCULAR; INTRAVENOUS AS NEEDED
Status: DISCONTINUED | OUTPATIENT
Start: 2022-06-06 | End: 2022-06-06 | Stop reason: SURG

## 2022-06-06 RX ORDER — LIDOCAINE HYDROCHLORIDE 20 MG/ML
INJECTION, SOLUTION EPIDURAL; INFILTRATION; INTRACAUDAL; PERINEURAL AS NEEDED
Status: DISCONTINUED | OUTPATIENT
Start: 2022-06-06 | End: 2022-06-06 | Stop reason: SURG

## 2022-06-06 RX ORDER — PROPOFOL 10 MG/ML
VIAL (ML) INTRAVENOUS AS NEEDED
Status: DISCONTINUED | OUTPATIENT
Start: 2022-06-06 | End: 2022-06-06 | Stop reason: SURG

## 2022-06-06 RX ORDER — NALOXONE HCL 0.4 MG/ML
0.4 VIAL (ML) INJECTION AS NEEDED
Status: DISCONTINUED | OUTPATIENT
Start: 2022-06-06 | End: 2022-06-06 | Stop reason: HOSPADM

## 2022-06-06 RX ORDER — FAMOTIDINE 10 MG/ML
INJECTION, SOLUTION INTRAVENOUS AS NEEDED
Status: DISCONTINUED | OUTPATIENT
Start: 2022-06-06 | End: 2022-06-06 | Stop reason: SURG

## 2022-06-06 RX ORDER — KETOROLAC TROMETHAMINE 30 MG/ML
15 INJECTION, SOLUTION INTRAMUSCULAR; INTRAVENOUS EVERY 6 HOURS PRN
Status: DISCONTINUED | OUTPATIENT
Start: 2022-06-06 | End: 2022-06-06 | Stop reason: HOSPADM

## 2022-06-06 RX ORDER — LIDOCAINE HYDROCHLORIDE 20 MG/ML
INJECTION, SOLUTION INFILTRATION; PERINEURAL AS NEEDED
Status: DISCONTINUED | OUTPATIENT
Start: 2022-06-06 | End: 2022-06-06 | Stop reason: HOSPADM

## 2022-06-06 RX ORDER — PROMETHAZINE HYDROCHLORIDE 25 MG/1
25 TABLET ORAL ONCE AS NEEDED
Status: DISCONTINUED | OUTPATIENT
Start: 2022-06-06 | End: 2022-06-06 | Stop reason: HOSPADM

## 2022-06-06 RX ORDER — FLUMAZENIL 0.1 MG/ML
0.5 INJECTION INTRAVENOUS AS NEEDED
Status: DISCONTINUED | OUTPATIENT
Start: 2022-06-06 | End: 2022-06-06 | Stop reason: HOSPADM

## 2022-06-06 RX ORDER — ACETAMINOPHEN 325 MG/1
650 TABLET ORAL ONCE AS NEEDED
Status: DISCONTINUED | OUTPATIENT
Start: 2022-06-06 | End: 2022-06-06

## 2022-06-06 RX ORDER — PROMETHAZINE HYDROCHLORIDE 25 MG/1
25 SUPPOSITORY RECTAL ONCE AS NEEDED
Status: DISCONTINUED | OUTPATIENT
Start: 2022-06-06 | End: 2022-06-06 | Stop reason: HOSPADM

## 2022-06-06 RX ADMIN — SODIUM CHLORIDE: 0.9 INJECTION, SOLUTION INTRAVENOUS at 11:37

## 2022-06-06 RX ADMIN — HYDROXYUREA 1000 MG: 500 CAPSULE ORAL at 20:07

## 2022-06-06 RX ADMIN — INSULIN GLARGINE 10 UNITS: 100 INJECTION, SOLUTION SUBCUTANEOUS at 17:12

## 2022-06-06 RX ADMIN — HYDROXYUREA 1000 MG: 500 CAPSULE ORAL at 09:06

## 2022-06-06 RX ADMIN — PROPOFOL 30 MG: 10 INJECTION, EMULSION INTRAVENOUS at 12:06

## 2022-06-06 RX ADMIN — FAMOTIDINE 10 MG: 10 INJECTION INTRAVENOUS at 11:39

## 2022-06-06 RX ADMIN — OXYCODONE 10 MG: 5 TABLET ORAL at 09:06

## 2022-06-06 RX ADMIN — GABAPENTIN 300 MG: 300 CAPSULE ORAL at 20:07

## 2022-06-06 RX ADMIN — Medication 10 ML: at 20:07

## 2022-06-06 RX ADMIN — OXYCODONE 10 MG: 5 TABLET ORAL at 14:47

## 2022-06-06 RX ADMIN — ACETAMINOPHEN 650 MG: 325 TABLET ORAL at 22:00

## 2022-06-06 RX ADMIN — GABAPENTIN 300 MG: 300 CAPSULE ORAL at 15:28

## 2022-06-06 RX ADMIN — INSULIN LISPRO 10 UNITS: 100 INJECTION, SOLUTION INTRAVENOUS; SUBCUTANEOUS at 19:11

## 2022-06-06 RX ADMIN — CITALOPRAM HYDROBROMIDE 20 MG: 20 TABLET ORAL at 09:06

## 2022-06-06 RX ADMIN — FUROSEMIDE 40 MG: 10 INJECTION, SOLUTION INTRAMUSCULAR; INTRAVENOUS at 09:06

## 2022-06-06 RX ADMIN — LIDOCAINE HYDROCHLORIDE 20 MG: 20 INJECTION, SOLUTION EPIDURAL; INFILTRATION; INTRACAUDAL; PERINEURAL at 11:59

## 2022-06-06 RX ADMIN — ACETAZOLAMIDE 250 MG: 250 TABLET ORAL at 15:28

## 2022-06-06 RX ADMIN — PROPOFOL 50 MG: 10 INJECTION, EMULSION INTRAVENOUS at 11:58

## 2022-06-06 RX ADMIN — DIPHENHYDRAMINE HYDROCHLORIDE 25 MG: 50 INJECTION, SOLUTION INTRAMUSCULAR; INTRAVENOUS at 11:39

## 2022-06-06 RX ADMIN — METOPROLOL SUCCINATE 25 MG: 25 TABLET, FILM COATED, EXTENDED RELEASE ORAL at 09:30

## 2022-06-06 RX ADMIN — INSULIN LISPRO 12 UNITS: 100 INJECTION, SOLUTION INTRAVENOUS; SUBCUTANEOUS at 09:06

## 2022-06-06 RX ADMIN — OXYCODONE 10 MG: 5 TABLET ORAL at 20:10

## 2022-06-06 RX ADMIN — CEFTRIAXONE 2 G: 2 INJECTION, POWDER, FOR SOLUTION INTRAMUSCULAR; INTRAVENOUS at 14:47

## 2022-06-06 RX ADMIN — INSULIN LISPRO 14 UNITS: 100 INJECTION, SOLUTION INTRAVENOUS; SUBCUTANEOUS at 16:41

## 2022-06-06 RX ADMIN — Medication 10 ML: at 09:07

## 2022-06-06 RX ADMIN — TRAZODONE HYDROCHLORIDE 50 MG: 50 TABLET ORAL at 20:07

## 2022-06-06 RX ADMIN — ALPRAZOLAM 0.25 MG: 0.25 TABLET ORAL at 20:10

## 2022-06-06 RX ADMIN — AZITHROMYCIN MONOHYDRATE 500 MG: 500 INJECTION, POWDER, LYOPHILIZED, FOR SOLUTION INTRAVENOUS at 15:28

## 2022-06-06 RX ADMIN — GABAPENTIN 300 MG: 300 CAPSULE ORAL at 09:06

## 2022-06-06 RX ADMIN — MAGNESIUM SULFATE HEPTAHYDRATE 4 G: 4 INJECTION, SOLUTION INTRAVENOUS at 09:07

## 2022-06-06 NOTE — CASE MANAGEMENT/SOCIAL WORK
Continued Stay Note  ZOHAIB Amor     Patient Name: Nieves Mc  MRN: [id number]  Today's Date: 6/6/2022    Admit Date: 6/1/2022     Discharge Plan     Row Name 06/06/22 1616       Plan    Plan Anticipate home with family.    Plan Comments Home with family pending clinical course.  Family can transport patient home at discharge.           Esther Perales RN   Phone communication or documentation only - no physical contact with patient or family.

## 2022-06-06 NOTE — PLAN OF CARE
Goal Outcome Evaluation:   VSS. Aox4. Ambulates independently to the bathroom. Pt still has intermittent, wheezing coughing fits. Oxygen at 2 L. Will continue to monitor.

## 2022-06-06 NOTE — ANESTHESIA PREPROCEDURE EVALUATION
Anesthesia Evaluation     Patient summary reviewed and Nursing notes reviewed   NPO Solid Status: > 6 hours  NPO Liquid Status: > 6 hours           Airway   Mallampati: II  TM distance: >3 FB  Neck ROM: full  No difficulty expected  Dental - normal exam     Pulmonary - normal exam    breath sounds clear to auscultation  (+) pulmonary embolism, shortness of breath,   Cardiovascular - negative cardio ROS and normal exam    ECG reviewed  Rhythm: regular  Rate: normal        Neuro/Psych  (+) headaches, numbness, psychiatric history,    GI/Hepatic/Renal/Endo    (+)   diabetes mellitus,     Musculoskeletal (-) negative ROS    Abdominal  - normal exam    Abdomen: soft.  Bowel sounds: normal.   Substance History - negative use     OB/GYN negative ob/gyn ROS         Other   blood dyscrasia,   history of cancer                    Anesthesia Plan    ASA 3     MAC     intravenous induction     Anesthetic plan, all risks, benefits, and alternatives have been provided, discussed and informed consent has been obtained with: patient.  Use of blood products discussed with patient .       CODE STATUS:    Code Status (Patient has no pulse and is not breathing): CPR (Attempt to Resuscitate)  Medical Interventions (Patient has pulse or is breathing): Full Support

## 2022-06-06 NOTE — PROGRESS NOTES
[facility] Medicine Services Daily Progress Note    Patient Name: Nieves Mc  : 1975  MRN: [id number]  Primary Care Physician:  Houston Oro MD  Date of admission: 2022      Subjective      Chief Complaint: Shortness of breath        Patient Reports she feels okay post bronchoscopy.  Oxygen turned down to 2 L.  Tachycardia is improving.     ROS negative except as above        Objective      Vitals:   Temp:  [98 °F (36.7 °C)-98.4 °F (36.9 °C)] 98 °F (36.7 °C)  Heart Rate:  [] 104  Resp:  [15-24] 16  BP: (114-130)/(66-85) 126/81  Flow (L/min):  [2-10] 2    Vitals reviewed.   Constitutional:       Comments: On 2 L today.  Family at bedside.    HENT:      Head: Normocephalic.      Nose: Nose normal.   Cardiovascular:      Rate and Rhythm: Regular rhythm.  Significant tachycardia present.      Pulses: Normal pulses.   Pulmonary:      Effort: Pulmonary effort is normal.   Abdominal:      General: There is ascites.      Comments: Significant splenomegaly   Musculoskeletal:         General: Normal range of motion.      Cervical back: Normal range of motion.   Skin:     General: Skin is warm.      Capillary Refill: Capillary refill takes less than 2 seconds.   Neurological:      General: No focal deficit present.   Psychiatric:         Mood and Affect: Mood normal.        Result Review    Result Review:  I have personally reviewed the results from the time of this admission to 2022 16:22 EDT and agree with these findings:  [x]  Laboratory  []  Microbiology  []  Radiology  []  EKG/Telemetry   []  Cardiology/Vascular   []  Pathology  []  Old records  []  Other:  Most notable findings include: Glucose 427 white count 118              Assessment & Plan    46-year-old lady on chemotherapy presents with sepsis pneumonia shortness of breath tachycardia anemia     Active Hospital Problems:          Active Hospital Problems     Diagnosis     • **CML (chronic myelocytic  leukemia) (HCC)     • Tachycardia     • Severe anemia     • Medically noncompliant     • Depression with anxiety        Plan:       CML with associated chronic pain  -WBCs improving  -Per oncology note, currently in chronic phase based on her bone marrow  -She admits noncompliance with treatment (tasigna 300 mg p.o. twice daily) since February 2022.  -Continue home Percocet  -Oncology consulted appreciate assistance      Severe anemia  -Hemoglobin stable today continue to monitor  -CT of the abdomen and pelvis shows no sign of GI bleed  -Monitor H&H  -Type and cross  -1 unit PRBC ordered for transfusion in ED  -Hold home Xarelto for now  -Another unit given June 2  Hemoglobin above 7.5 continue to monitor closely     Diabetes mellitus type 2  -Hemoglobin A1c ordered  -Patient also reports noncompliance with insulin  -Blood glucose 533 on arrival to ED decreased to 477 after receiving 8 units of regular insulin  -Accu-Cheks AC at bedtime  -Continue home NPH  -Continue home gabapentin  -SSI ordered  -Start Lantus 10 units daily      Depression with anxiety  -Chronic, stable  -Continue home alprazolam, Celexa, trazodone     Tachycardia  -EKG reviewed  -Continuous cardiac monitoring  -Continue home metoprolol succinate XL  -CT PE negative on June 4     Persistent nausea vomiting  Abdominal x-ray normal  Supportive care with Zofran      CT PE shows bilateral opacities pneumonia with possible ARDS versus opportunistic infection.  Echo pending.  Appreciate pulmonary assistance.  Possible transfusion reaction on admission as her symptoms got worse posttransfusion.  May need some steroids.    Status post bronchoscopy on June 6 follow-up results     DVT prophylaxis:  Mechanical DVT prophylaxis orders are present.     CODE STATUS:    Code Status (Patient has no pulse and is not breathing): CPR (Attempt to Resuscitate)  Medical Interventions (Patient has pulse or is breathing): Full Support     Admission Status:  I believe this  patient meets inpatient status.     I discussed the patient's findings and my recommendations with patient.     This patient has been examined wearing appropriate Personal Protective Equipment   06/06/22      Electronically signed by Villa Patel MD, 06/06/22, 16:22 EDT.  [facility] Hospitalist Team

## 2022-06-06 NOTE — PROGRESS NOTES
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 specially with her immune history     CML (chronic myelocytic leukemia) (HCC)  Severe extensive bilateral groundglass opacities: Differential diagnosis Transfusion-related acute lung injury (TRALI), transfusion-associated circulatory overload (TACO), atypical pneumonia, ARDS and/or pulmonary edema: CT scan 6/4/2022 negative for PE    History of COVID-19 in January 2022    CML with severe leukocytosis: WBCs 123.4    Severe anemia  Diabetes mellitus type 2 with hyperglycemia  Mild hyponatremia  History of depression and anxiety    2D echo 6/5/2022  EF 58%  RVSP 46.6 mmHg        Recommendations:    Bronchoscopy today to rule out opportunistic infection  Send BAL for pertussis PCR  Serum antibodies IgG/IgM    Start azithromycin after bronchoscopy today    Will discuss steroids with oncology    Oxygen supplement and titration maintain saturation 90 to 95%: Currently requiring 2 L of oxygen    Antibiotic Rocephin  Diamox 250 mg daily, elevated JVD   Lasix 40 mg daily  Blood pressure control  Glucose control             LOS: 3 days     Subjective         Objective     Vital signs for last 24 hours:  Vitals:    06/05/22 1130 06/05/22 1900 06/06/22 0027 06/06/22 0451   BP: 172/97 114/69 116/66 121/71   BP Location: Right arm Right arm Right arm Right arm   Patient Position: Lying Lying Lying Lying   Pulse: 120 89 97 96   Resp: (!) 33 17 21 20   Temp: 100.3 °F (37.9 °C) 98.4 °F (36.9 °C) 98.1 °F (36.7 °C) 98.1 °F (36.7 °C)   TempSrc: Oral Oral Oral Oral   SpO2: 91% 95% 93% 92%   Weight:       Height:            Intake/Output last 3 shifts:  I/O last 3 completed shifts:  In: 1745 [P.O.:760; I.V.:985]  Out: 1400 [Urine:1400]  Intake/Output this shift:  I/O this shift:  In: -   Out: 800 [Urine:800]      Radiology  Imaging Results (Last 24 Hours)     ** No results found for the last 24 hours. **          Labs:  Results from last 7 days   Lab Units 06/05/22 0403   WBC 10*3/mm3 123.40*   HEMOGLOBIN g/dL 7.7*   HEMATOCRIT % 24.8*   PLATELETS 10*3/mm3 312     Results from last 7 days   Lab Units 06/05/22 0403 06/02/22  0747 06/01/22 2037   SODIUM mmol/L 130*   < > 129*   POTASSIUM mmol/L 3.9   < > 3.9   CHLORIDE mmol/L 100   < > 94*   CO2 mmol/L 20.0*   < > 24.0   BUN mg/dL 8   < > 9   CREATININE mg/dL 0.51*   < > 0.58   CALCIUM mg/dL 8.4*   < > 8.2*   BILIRUBIN mg/dL  --   --  0.9   ALK PHOS U/L  --   --  949*   ALT (SGPT) U/L  --   --  <5   AST (SGOT) U/L  --   --  9   GLUCOSE mg/dL 372*   < > 533*    < > = values in this interval not displayed.         Results from last 7 days   Lab Units 06/01/22 2037   ALBUMIN g/dL 3.20*             Results from last 7 days   Lab Units 06/05/22 0403   MAGNESIUM mg/dL 1.6     Results from last 7 days   Lab Units 06/01/22 2037   INR  1.27*               Meds:   SCHEDULE  acetaZOLAMIDE, 250 mg, Oral, Daily  cefTRIAXone, 2 g, Intravenous, Q24H  citalopram, 20 mg, Oral, Daily  furosemide, 40 mg, Intravenous, Daily  gabapentin, 300 mg, Oral, TID  hydroxyurea, 1,000 mg, Oral, BID  insulin lispro, 0-14 Units, Subcutaneous, TID AC  metoprolol succinate XL, 25 mg, Oral, Daily  sodium chloride, 10 mL, Intravenous, Q12H  traZODone, 50 mg, Oral, Nightly      Infusions     PRNs  •  acetaminophen **OR** acetaminophen **OR** acetaminophen  •  ALPRAZolam  •  aluminum-magnesium hydroxide-simethicone  •  dextrose  •  dextrose  •  glucagon (human recombinant)  •  insulin lispro **AND** insulin lispro  •  magnesium sulfate **OR** magnesium sulfate **OR** magnesium sulfate  •  melatonin  •   nitroglycerin  •  ondansetron **OR** ondansetron  •  oxyCODONE  •  [COMPLETED] Insert peripheral IV **AND** sodium chloride  •  sodium chloride    Physical Exam:  Physical Exam  Pulmonary:      Breath sounds: Rhonchi and rales present.         ROS  Review of Systems   Respiratory: Positive for cough and shortness of breath.        I have reviewed the patient's new clinical results.    Electronically signed by JP Paige.

## 2022-06-06 NOTE — ANESTHESIA POSTPROCEDURE EVALUATION
Patient: Nieves Mc    Procedure Summary     Date: 06/06/22 Room / Location: [facility] ENDOSCOPY 3 / [facility] ENDOSCOPY    Anesthesia Start: 1137 Anesthesia Stop: 1220    Procedure: BRONCHOSCOPY bilateral lung washing (N/A Bronchus) Diagnosis:       Atypical pneumonia      (Atypical pneumonia [J18.9])    Surgeons: Charlene Camara MD Provider: Juan Farr MD    Anesthesia Type: MAC ASA Status: 3          Anesthesia Type: MAC    Vitals  Vitals Value Taken Time   /79 06/06/22 1245   Temp     Pulse 108 06/06/22 1245   Resp 15 06/06/22 1245   SpO2 94 % 06/06/22 1245           Post Anesthesia Care and Evaluation    Patient location during evaluation: PACU  Patient participation: complete - patient participated  Level of consciousness: awake  Pain scale: See nurse's notes for pain score.  Pain management: adequate  Airway patency: patent  Anesthetic complications: No anesthetic complications  PONV Status: none  Cardiovascular status: acceptable  Respiratory status: acceptable  Hydration status: acceptable    Comments: Patient seen and examined postoperatively; vital signs stable; SpO2 greater than or equal to 90%; cardiopulmonary status stable; nausea/vomiting adequately controlled; pain adequately controlled; no apparent anesthesia complications; patient discharged from anesthesia care when discharge criteria were met

## 2022-06-06 NOTE — TELEPHONE ENCOUNTER
Caller: SAMUEL GUERRERO    Relationship: MOTHER    Best call back number: [phone]    What was the call regarding: PT HAS BEEN IN THE HOSPITAL FOR A WEEK. SHE HAD AN APPT TODAY (6/6/2022). PT'S MOTHER WOULD LIKE TO KNOW IF SOMEONE CAN SEE HER IN THE HOSPITAL.     Do you require a callback: YES

## 2022-06-06 NOTE — OP NOTE
Bronchoscopy Procedure Note    Timeout was done appropriately by staff    Procedure:  1. Bronchoscopy, Diagnostic  2. Bronchoalveolar lavage, BAL    Preoperative Diagnosis: Bilateral groundglass opacities in immunocompromised host    Postoperative Diagnosis:  No evidence of alveolar hemorrhage  No significant secretions noted endobronchially  Bilateral lung washing was done with return of cloudy fluid      Anesthesia: Moderate Sedation    Procedure Details: Patient was consented for the procedure with all risks and benefits of the procedure explained in detail.  Patient was given the opportunity to ask questions and all concerns were answered.  The bronchocope was inserted into the main airway via the oropharynx. An anatomical survey was done of the main airways and the subsegmental bronchus to at least the first subsegmental level of all five lobes of both lungs.  The findings are reported below.  A bronchoalveolar lavage was performed using aliquots of normal saline instilled into the airways then aspirated back.    Findings:  Bronchoscope passed into oral cavity to the level of the vocal cords.  Lidocaine used for local anesthetic over vocal cords.  Bronchoscope was passed between the vocal cords into the trachea.  All airways were visualized to at least the first subsegment level of all 5 lobes of both lungs.  Airways were of normal size and caliber.  No endobronchial lesions seen.      No evidence of alveolar hemorrhage  No significant secretions noted endobronchially  Bilateral lung washing was done with return of cloudy fluid      Patient tolerated procedure well        Estimated Blood Loss:  Minimal           Specimens:  Sent serosanguinous fluid                Complications:  None; patient tolerated the procedure well.           Disposition: PACU - hemodynamically stable.      Patient tolerated the procedure well.    Charlene Camara MD  6/6/2022  17:07 EDT

## 2022-06-07 LAB
ABO GROUP BLD: NORMAL
ALBUMIN SERPL-MCNC: 3.1 G/DL (ref 3.5–5.2)
ALBUMIN/GLOB SERPL: 1.1 G/DL
ALP SERPL-CCNC: 908 U/L (ref 39–117)
ALT SERPL W P-5'-P-CCNC: <5 U/L (ref 1–33)
ANION GAP SERPL CALCULATED.3IONS-SCNC: 10 MMOL/L (ref 5–15)
ANION GAP SERPL CALCULATED.3IONS-SCNC: 13 MMOL/L (ref 5–15)
ANISOCYTOSIS BLD QL: ABNORMAL
ANTI-E: NORMAL
ANTI-JKA: NORMAL
AST SERPL-CCNC: 5 U/L (ref 1–32)
BASOPHILS # BLD MANUAL: 6.35 10*3/MM3 (ref 0–0.2)
BASOPHILS NFR BLD MANUAL: 8 % (ref 0–1.5)
BILIRUB SERPL-MCNC: 0.5 MG/DL (ref 0–1.2)
BLASTS NFR BLD MANUAL: 5 % (ref 0–0)
BLD GP AB SCN SERPL QL: POSITIVE
BUN SERPL-MCNC: 11 MG/DL (ref 6–20)
BUN SERPL-MCNC: 15 MG/DL (ref 6–20)
BUN/CREAT SERPL: 22.4 (ref 7–25)
BUN/CREAT SERPL: 25 (ref 7–25)
CALCIUM SPEC-SCNC: 7.8 MG/DL (ref 8.6–10.5)
CALCIUM SPEC-SCNC: 8.1 MG/DL (ref 8.6–10.5)
CHLORIDE SERPL-SCNC: 101 MMOL/L (ref 98–107)
CHLORIDE SERPL-SCNC: 97 MMOL/L (ref 98–107)
CO2 SERPL-SCNC: 21 MMOL/L (ref 22–29)
CO2 SERPL-SCNC: 24 MMOL/L (ref 22–29)
CREAT SERPL-MCNC: 0.49 MG/DL (ref 0.57–1)
CREAT SERPL-MCNC: 0.6 MG/DL (ref 0.57–1)
CRP SERPL-MCNC: 4.28 MG/DL (ref 0–0.5)
DEPRECATED RDW RBC AUTO: 47.3 FL (ref 37–54)
EGFRCR SERPLBLD CKD-EPI 2021: 112.3 ML/MIN/1.73
EGFRCR SERPLBLD CKD-EPI 2021: 117.9 ML/MIN/1.73
EOSINOPHIL # BLD MANUAL: 5.56 10*3/MM3 (ref 0–0.4)
EOSINOPHIL NFR BLD MANUAL: 7 % (ref 0.3–6.2)
ERYTHROCYTE [DISTWIDTH] IN BLOOD BY AUTOMATED COUNT: 17.9 % (ref 12.3–15.4)
GLOBULIN UR ELPH-MCNC: 2.7 GM/DL
GLUCOSE BLDC GLUCOMTR-MCNC: 110 MG/DL (ref 70–105)
GLUCOSE BLDC GLUCOMTR-MCNC: 141 MG/DL (ref 70–105)
GLUCOSE BLDC GLUCOMTR-MCNC: 224 MG/DL (ref 70–105)
GLUCOSE BLDC GLUCOMTR-MCNC: 312 MG/DL (ref 70–105)
GLUCOSE BLDC GLUCOMTR-MCNC: 407 MG/DL (ref 70–105)
GLUCOSE BLDC GLUCOMTR-MCNC: 414 MG/DL (ref 70–105)
GLUCOSE BLDC GLUCOMTR-MCNC: 541 MG/DL (ref 70–105)
GLUCOSE BLDC GLUCOMTR-MCNC: 575 MG/DL (ref 70–105)
GLUCOSE BLDC GLUCOMTR-MCNC: 578 MG/DL (ref 70–105)
GLUCOSE BLDC GLUCOMTR-MCNC: >600 MG/DL (ref 70–105)
GLUCOSE BLDC GLUCOMTR-MCNC: >600 MG/DL (ref 70–105)
GLUCOSE SERPL-MCNC: 244 MG/DL (ref 65–99)
GLUCOSE SERPL-MCNC: 601 MG/DL (ref 65–99)
HCT VFR BLD AUTO: 22.4 % (ref 34–46.6)
HCT VFR BLD AUTO: 30.7 % (ref 34–46.6)
HGB BLD-MCNC: 7 G/DL (ref 12–15.9)
HGB BLD-MCNC: 8.8 G/DL (ref 12–15.9)
LAB AP CASE REPORT: NORMAL
LAB AP CASE REPORT: NORMAL
LYMPHOCYTES # BLD MANUAL: 6.35 10*3/MM3 (ref 0.7–3.1)
LYMPHOCYTES NFR BLD MANUAL: 14 % (ref 5–12)
MAGNESIUM SERPL-MCNC: 2.1 MG/DL (ref 1.6–2.6)
MCH RBC QN AUTO: 23.7 PG (ref 26.6–33)
MCHC RBC AUTO-ENTMCNC: 31.2 G/DL (ref 31.5–35.7)
MCV RBC AUTO: 75.9 FL (ref 79–97)
METAMYELOCYTES NFR BLD MANUAL: 3 % (ref 0–0)
MICROCYTES BLD QL: ABNORMAL
MONOCYTES # BLD: 11.12 10*3/MM3 (ref 0.1–0.9)
MYELOCYTES NFR BLD MANUAL: 3 % (ref 0–0)
NEUTROPHILS # BLD AUTO: 39.7 10*3/MM3 (ref 1.7–7)
NEUTROPHILS NFR BLD MANUAL: 41 % (ref 42.7–76)
NEUTS BAND NFR BLD MANUAL: 9 % (ref 0–5)
NRBC SPEC MANUAL: 3 /100 WBC (ref 0–0.2)
PATH REPORT.FINAL DX SPEC: NORMAL
PATH REPORT.FINAL DX SPEC: NORMAL
PATH REPORT.GROSS SPEC: NORMAL
PHOSPHATE SERPL-MCNC: 4.1 MG/DL (ref 2.5–4.5)
PLAT MORPH BLD: NORMAL
PLATELET # BLD AUTO: 305 10*3/MM3 (ref 140–450)
PMV BLD AUTO: 8.1 FL (ref 6–12)
POIKILOCYTOSIS BLD QL SMEAR: ABNORMAL
POLYCHROMASIA BLD QL SMEAR: ABNORMAL
POTASSIUM SERPL-SCNC: 3.8 MMOL/L (ref 3.5–5.2)
POTASSIUM SERPL-SCNC: 3.9 MMOL/L (ref 3.5–5.2)
PROMYELOCYTES NFR BLD MANUAL: 2 % (ref 0–0)
PROT SERPL-MCNC: 5.8 G/DL (ref 6–8.5)
RBC # BLD AUTO: 2.95 10*6/MM3 (ref 3.77–5.28)
RH BLD: POSITIVE
SCAN SLIDE: NORMAL
SODIUM SERPL-SCNC: 131 MMOL/L (ref 136–145)
SODIUM SERPL-SCNC: 135 MMOL/L (ref 136–145)
T&S EXPIRATION DATE: NORMAL
VARIANT LYMPHS NFR BLD MANUAL: 1 % (ref 0–5)
VARIANT LYMPHS NFR BLD MANUAL: 7 % (ref 19.6–45.3)
WBC MORPH BLD: NORMAL
WBC NRBC COR # BLD: 79.4 10*3/MM3 (ref 3.4–10.8)

## 2022-06-07 PROCEDURE — 86922 COMPATIBILITY TEST ANTIGLOB: CPT

## 2022-06-07 PROCEDURE — 36430 TRANSFUSION BLD/BLD COMPNT: CPT

## 2022-06-07 PROCEDURE — 85014 HEMATOCRIT: CPT | Performed by: INTERNAL MEDICINE

## 2022-06-07 PROCEDURE — 25010000002 CEFTRIAXONE PER 250 MG: Performed by: INTERNAL MEDICINE

## 2022-06-07 PROCEDURE — 85007 BL SMEAR W/DIFF WBC COUNT: CPT | Performed by: INTERNAL MEDICINE

## 2022-06-07 PROCEDURE — 63710000001 PREDNISONE PER 5 MG: Performed by: INTERNAL MEDICINE

## 2022-06-07 PROCEDURE — 63710000001 INSULIN GLARGINE PER 5 UNITS: Performed by: INTERNAL MEDICINE

## 2022-06-07 PROCEDURE — 86901 BLOOD TYPING SEROLOGIC RH(D): CPT | Performed by: INTERNAL MEDICINE

## 2022-06-07 PROCEDURE — 86900 BLOOD TYPING SEROLOGIC ABO: CPT

## 2022-06-07 PROCEDURE — 86900 BLOOD TYPING SEROLOGIC ABO: CPT | Performed by: INTERNAL MEDICINE

## 2022-06-07 PROCEDURE — 99232 SBSQ HOSP IP/OBS MODERATE 35: CPT | Performed by: INTERNAL MEDICINE

## 2022-06-07 PROCEDURE — 85018 HEMOGLOBIN: CPT | Performed by: INTERNAL MEDICINE

## 2022-06-07 PROCEDURE — 83735 ASSAY OF MAGNESIUM: CPT | Performed by: INTERNAL MEDICINE

## 2022-06-07 PROCEDURE — 63710000001 INSULIN LISPRO (HUMAN) PER 5 UNITS: Performed by: INTERNAL MEDICINE

## 2022-06-07 PROCEDURE — 25010000002 FUROSEMIDE PER 20 MG: Performed by: INTERNAL MEDICINE

## 2022-06-07 PROCEDURE — P9016 RBC LEUKOCYTES REDUCED: HCPCS

## 2022-06-07 PROCEDURE — 85025 COMPLETE CBC W/AUTO DIFF WBC: CPT | Performed by: INTERNAL MEDICINE

## 2022-06-07 PROCEDURE — 80053 COMPREHEN METABOLIC PANEL: CPT | Performed by: INTERNAL MEDICINE

## 2022-06-07 PROCEDURE — 63710000001 PREDNISONE PER 1 MG: Performed by: INTERNAL MEDICINE

## 2022-06-07 PROCEDURE — 86850 RBC ANTIBODY SCREEN: CPT | Performed by: INTERNAL MEDICINE

## 2022-06-07 PROCEDURE — 63710000001 INSULIN REGULAR HUMAN PER 5 UNITS: Performed by: INTERNAL MEDICINE

## 2022-06-07 PROCEDURE — 84100 ASSAY OF PHOSPHORUS: CPT | Performed by: INTERNAL MEDICINE

## 2022-06-07 PROCEDURE — 82962 GLUCOSE BLOOD TEST: CPT

## 2022-06-07 PROCEDURE — 82010 KETONE BODYS QUAN: CPT | Performed by: INTERNAL MEDICINE

## 2022-06-07 PROCEDURE — 86870 RBC ANTIBODY IDENTIFICATION: CPT | Performed by: INTERNAL MEDICINE

## 2022-06-07 PROCEDURE — 63710000001 INSULIN LISPRO (HUMAN) PER 5 UNITS

## 2022-06-07 PROCEDURE — 86140 C-REACTIVE PROTEIN: CPT | Performed by: INTERNAL MEDICINE

## 2022-06-07 RX ORDER — SODIUM CHLORIDE 0.9 % (FLUSH) 0.9 %
10 SYRINGE (ML) INJECTION AS NEEDED
Status: DISCONTINUED | OUTPATIENT
Start: 2022-06-07 | End: 2022-06-10 | Stop reason: HOSPADM

## 2022-06-07 RX ORDER — INSULIN LISPRO 100 [IU]/ML
0-24 INJECTION, SOLUTION INTRAVENOUS; SUBCUTANEOUS
Status: DISCONTINUED | OUTPATIENT
Start: 2022-06-07 | End: 2022-06-07

## 2022-06-07 RX ORDER — NICOTINE POLACRILEX 4 MG
15 LOZENGE BUCCAL
Status: DISCONTINUED | OUTPATIENT
Start: 2022-06-07 | End: 2022-06-08

## 2022-06-07 RX ORDER — SODIUM CHLORIDE 0.9 % (FLUSH) 0.9 %
10 SYRINGE (ML) INJECTION EVERY 12 HOURS SCHEDULED
Status: DISCONTINUED | OUTPATIENT
Start: 2022-06-07 | End: 2022-06-10 | Stop reason: HOSPADM

## 2022-06-07 RX ORDER — PREDNISONE 20 MG/1
20 TABLET ORAL DAILY
Status: COMPLETED | OUTPATIENT
Start: 2022-06-09 | End: 2022-06-10

## 2022-06-07 RX ORDER — DEXTROSE MONOHYDRATE 25 G/50ML
10-50 INJECTION, SOLUTION INTRAVENOUS
Status: DISCONTINUED | OUTPATIENT
Start: 2022-06-07 | End: 2022-06-08

## 2022-06-07 RX ORDER — INSULIN LISPRO 100 [IU]/ML
0-24 INJECTION, SOLUTION INTRAVENOUS; SUBCUTANEOUS AS NEEDED
Status: DISCONTINUED | OUTPATIENT
Start: 2022-06-07 | End: 2022-06-07

## 2022-06-07 RX ORDER — OLANZAPINE 10 MG/2ML
1 INJECTION, POWDER, LYOPHILIZED, FOR SOLUTION INTRAMUSCULAR
Status: DISCONTINUED | OUTPATIENT
Start: 2022-06-07 | End: 2022-06-08

## 2022-06-07 RX ORDER — AZITHROMYCIN 250 MG/1
250 TABLET, FILM COATED ORAL
Status: DISCONTINUED | OUTPATIENT
Start: 2022-06-07 | End: 2022-06-10 | Stop reason: HOSPADM

## 2022-06-07 RX ORDER — PREDNISONE 10 MG/1
10 TABLET ORAL DAILY
Status: DISCONTINUED | OUTPATIENT
Start: 2022-06-11 | End: 2022-06-10 | Stop reason: HOSPADM

## 2022-06-07 RX ADMIN — OXYCODONE 10 MG: 5 TABLET ORAL at 14:22

## 2022-06-07 RX ADMIN — ALPRAZOLAM 0.25 MG: 0.25 TABLET ORAL at 21:23

## 2022-06-07 RX ADMIN — INSULIN LISPRO 24 UNITS: 100 INJECTION, SOLUTION INTRAVENOUS; SUBCUTANEOUS at 12:41

## 2022-06-07 RX ADMIN — HYDROXYUREA 1000 MG: 500 CAPSULE ORAL at 08:39

## 2022-06-07 RX ADMIN — INSULIN GLARGINE 20 UNITS: 100 INJECTION, SOLUTION SUBCUTANEOUS at 12:41

## 2022-06-07 RX ADMIN — INSULIN HUMAN 3.3 UNITS/HR: 1 INJECTION, SOLUTION INTRAVENOUS at 21:23

## 2022-06-07 RX ADMIN — INSULIN LISPRO 24 UNITS: 100 INJECTION, SOLUTION INTRAVENOUS; SUBCUTANEOUS at 18:12

## 2022-06-07 RX ADMIN — CEFTRIAXONE 2 G: 2 INJECTION, POWDER, FOR SOLUTION INTRAMUSCULAR; INTRAVENOUS at 14:57

## 2022-06-07 RX ADMIN — SODIUM CHLORIDE 1000 ML: 9 INJECTION, SOLUTION INTRAVENOUS at 17:02

## 2022-06-07 RX ADMIN — Medication 10 ML: at 08:44

## 2022-06-07 RX ADMIN — GABAPENTIN 300 MG: 300 CAPSULE ORAL at 08:39

## 2022-06-07 RX ADMIN — OXYCODONE 10 MG: 5 TABLET ORAL at 19:48

## 2022-06-07 RX ADMIN — INSULIN HUMAN 14 UNITS: 100 INJECTION, SOLUTION PARENTERAL at 14:57

## 2022-06-07 RX ADMIN — Medication 10 ML: at 21:24

## 2022-06-07 RX ADMIN — ACETAZOLAMIDE 250 MG: 250 TABLET ORAL at 08:37

## 2022-06-07 RX ADMIN — PREDNISONE 30 MG: 20 TABLET ORAL at 08:42

## 2022-06-07 RX ADMIN — FUROSEMIDE 40 MG: 10 INJECTION, SOLUTION INTRAMUSCULAR; INTRAVENOUS at 08:39

## 2022-06-07 RX ADMIN — Medication 10 ML: at 21:26

## 2022-06-07 RX ADMIN — TRAZODONE HYDROCHLORIDE 50 MG: 50 TABLET ORAL at 21:24

## 2022-06-07 RX ADMIN — SODIUM CHLORIDE 500 ML: 9 INJECTION, SOLUTION INTRAVENOUS at 15:52

## 2022-06-07 RX ADMIN — INSULIN LISPRO 10 UNITS: 100 INJECTION, SOLUTION INTRAVENOUS; SUBCUTANEOUS at 08:40

## 2022-06-07 RX ADMIN — AZITHROMYCIN MONOHYDRATE 250 MG: 250 TABLET ORAL at 08:37

## 2022-06-07 RX ADMIN — INSULIN HUMAN 10 UNITS: 100 INJECTION, SOLUTION PARENTERAL at 15:31

## 2022-06-07 RX ADMIN — INSULIN LISPRO 24 UNITS: 100 INJECTION, SOLUTION INTRAVENOUS; SUBCUTANEOUS at 14:44

## 2022-06-07 RX ADMIN — METOPROLOL SUCCINATE 25 MG: 25 TABLET, FILM COATED, EXTENDED RELEASE ORAL at 08:39

## 2022-06-07 RX ADMIN — OXYCODONE 10 MG: 5 TABLET ORAL at 08:37

## 2022-06-07 RX ADMIN — GABAPENTIN 300 MG: 300 CAPSULE ORAL at 21:24

## 2022-06-07 RX ADMIN — GABAPENTIN 300 MG: 300 CAPSULE ORAL at 16:40

## 2022-06-07 RX ADMIN — CITALOPRAM HYDROBROMIDE 20 MG: 20 TABLET ORAL at 08:39

## 2022-06-07 NOTE — PLAN OF CARE
Goal Outcome Evaluation:     Problem: Adult Inpatient Plan of Care  Goal: Plan of Care Review  Outcome: Ongoing, Progressing  Flowsheets  Taken 6/7/2022 0118 by Sindhu Boland, RN  Progress: improving  Taken 6/6/2022 1247 by Mulu Alonso, RN  Plan of Care Reviewed With: patient           Progress: improving

## 2022-06-07 NOTE — PLAN OF CARE
Goal Outcome Evaluation:      Pt being transferred to PCU. Glucose levels this AM were in the 300s but this afternoon have increased to over 600. 48 subQ units sliding scale, 24 units through IV, and 20 units long-acting. 1500mL NS bolus given. Complaints of moderate pain in legs. 1 unit of PRBC for hgb 7.0. 2L NC. VSS. A/O x4. Verbalizing anxiety about blood sugar and how much insulin she is getting. Care plan ongoing. Will call report to PCU.     Progress: no change

## 2022-06-07 NOTE — PROGRESS NOTES
Daily Progress Note        CML (chronic myelocytic leukemia) (HCC)    Depression with anxiety    Severe anemia    Medically noncompliant    Tachycardia    CML (chronic myeloid leukemia) with failed remission (HCC)    Moderate malnutrition (HCC)    Atypical pneumonia      Assessment    Groundglass opacities with severe cough     CML (chronic myelocytic leukemia) (HCC)  Severe extensive bilateral groundglass opacities: Differential diagnosis Transfusion-related acute lung injury (TRALI), transfusion-associated circulatory overload (TACO), atypical pneumonia, ARDS and/or pulmonary edema: CT scan 6/4/2022 negative for PE    6/6/2022 bronchoscopy, respiratory panel negative  No evidence of alveolar hemorrhage  No significant secretions noted endobronchially  Bilateral lung washing was done with return of cloudy fluid    History of COVID-19 in January 2022    CML with severe leukocytosis  Bandemia    Severe anemia  Diabetes mellitus type 2 with hyperglycemia  Mild hyponatremia  History of depression and anxiety    2D echo 6/5/2022  EF 58%  RVSP 46.6 mmHg        Recommendations:    Antibiotic Rocephin, Azithromycin p.o. x7 days  Will order short course of prednisone for 6 days  Monitor BAL    Oxygen supplement and titration maintain saturation 90 to 95%: Currently requiring 2 L of oxygen    Diamox 250 mg daily,  Lasix 40 mg daily  Blood pressure control  Glucose control             LOS: 4 days     Subjective         Objective     Vital signs for last 24 hours:  Vitals:    06/06/22 1618 06/06/22 2057 06/07/22 0100 06/07/22 0512   BP: 119/73 112/65 104/60 105/64   BP Location: Right arm Right arm Right arm Right arm   Patient Position: Lying Lying Lying Lying   Pulse: 109 100 99 91   Resp: 20 22 17 16   Temp: 97.8 °F (36.6 °C) 100.1 °F (37.8 °C) 97.9 °F (36.6 °C) 97.8 °F (36.6 °C)   TempSrc: Oral Oral Oral Oral   SpO2: 94% 100% 100% 93%   Weight:       Height:           Intake/Output last 3 shifts:  I/O last 3 completed  shifts:  In: 836 [P.O.:360; I.V.:476]  Out: 1950 [Urine:1950]  Intake/Output this shift:  I/O this shift:  In: 240 [P.O.:240]  Out: -       Radiology  Imaging Results (Last 24 Hours)     ** No results found for the last 24 hours. **          Labs:  Results from last 7 days   Lab Units 06/07/22  0328   WBC 10*3/mm3 79.40*   HEMOGLOBIN g/dL 7.0*   HEMATOCRIT % 22.4*   PLATELETS 10*3/mm3 305     Results from last 7 days   Lab Units 06/07/22  0328   SODIUM mmol/L 135*   POTASSIUM mmol/L 3.8   CHLORIDE mmol/L 101   CO2 mmol/L 24.0   BUN mg/dL 11   CREATININE mg/dL 0.49*   CALCIUM mg/dL 7.8*   BILIRUBIN mg/dL 0.5   ALK PHOS U/L 908*   ALT (SGPT) U/L <5   AST (SGOT) U/L 5   GLUCOSE mg/dL 244*         Results from last 7 days   Lab Units 06/07/22 0328 06/01/22 2037   ALBUMIN g/dL 3.10* 3.20*             Results from last 7 days   Lab Units 06/07/22 0328   MAGNESIUM mg/dL 2.1     Results from last 7 days   Lab Units 06/01/22 2037   INR  1.27*               Meds:   SCHEDULE  acetaZOLAMIDE, 250 mg, Oral, Daily  azithromycin, 500 mg, Intravenous, Q24H  cefTRIAXone, 2 g, Intravenous, Q24H  citalopram, 20 mg, Oral, Daily  furosemide, 40 mg, Intravenous, Daily  gabapentin, 300 mg, Oral, TID  hydroxyurea, 1,000 mg, Oral, BID  insulin glargine, 10 Units, Subcutaneous, Nightly  insulin lispro, 0-14 Units, Subcutaneous, TID AC  metoprolol succinate XL, 25 mg, Oral, Daily  sodium chloride, 10 mL, Intravenous, Q12H  traZODone, 50 mg, Oral, Nightly      Infusions     PRNs  •  acetaminophen **OR** acetaminophen **OR** acetaminophen  •  ALPRAZolam  •  aluminum-magnesium hydroxide-simethicone  •  dextrose  •  dextrose  •  glucagon (human recombinant)  •  insulin lispro **AND** insulin lispro  •  magnesium sulfate **OR** magnesium sulfate **OR** magnesium sulfate  •  melatonin  •  nitroglycerin  •  ondansetron **OR** ondansetron  •  oxyCODONE  •  [COMPLETED] Insert peripheral IV **AND** sodium chloride  •  sodium chloride    Physical  Exam:  Physical Exam  Vitals reviewed.   Pulmonary:      Breath sounds: Rhonchi present.   Skin:     General: Skin is warm and dry.   Neurological:      Mental Status: She is alert and oriented to person, place, and time.         ROS  Review of Systems   Respiratory: Positive for cough and shortness of breath.        I have reviewed the patient's new clinical results.    Electronically signed by JP Paige.

## 2022-06-07 NOTE — PROGRESS NOTES
Nutrition Services    Patient Name: Nieves Mc  YOB: 1975  MRN: [id number]  Admission date: 6/1/2022    PPE Documentation        PPE Worn By Provider Did not enter room for this encounter   PPE Worn By Patient  N/A     PROGRESS NOTE      Encounter Information: Progress note to check on PO intakes. Pt has been eating 50-75% at recent meals while diet order active. Still with occasional N/V. Overall, current diet remains the most appropriate PO intervention at this time; will continue as tolerated.       PO Diet: Diet Diabetic/Consistent Carbs; Diabetic - Consistent Carb   PO Supplements: Boost Glucose Control BID (Provides 380 kcals, 32 g protein if consumed)      PO Intake:  50-75% at meals while diet active        Nutrition support orders:    Nutrition support review:        Labs (reviewed below): Hyperglycemia ongoing -- DM-specific diet and ONS are in place and pt receiving DM educ this admission        GI Function:  Stool Output  Stool Unmeasured Occurrence: 1 (06/06/22 1713)  Bowel Incontinence: No (06/06/22 1713)          Nutrition Intervention: Continue PO diet and ONS as tolerated.       Results from last 7 days   Lab Units 06/07/22 0328 06/06/22  0949 06/05/22  0403 06/02/22  0747 06/01/22 2037   SODIUM mmol/L 135* 132* 130*   < > 129*   POTASSIUM mmol/L 3.8 3.7 3.9   < > 3.9   CHLORIDE mmol/L 101 99 100   < > 94*   CO2 mmol/L 24.0 22.0 20.0*   < > 24.0   BUN mg/dL 11 9 8   < > 9   CREATININE mg/dL 0.49* 0.49* 0.51*   < > 0.58   CALCIUM mg/dL 7.8* 8.3* 8.4*   < > 8.2*   BILIRUBIN mg/dL 0.5  --   --   --  0.9   ALK PHOS U/L 908*  --   --   --  949*   ALT (SGPT) U/L <5  --   --   --  <5   AST (SGOT) U/L 5  --   --   --  9   GLUCOSE mg/dL 244* 427* 372*   < > 533*    < > = values in this interval not displayed.     Results from last 7 days   Lab Units 06/07/22 0328 06/06/22  0949 06/05/22  0403   MAGNESIUM mg/dL 2.1 1.9 1.6   PHOSPHORUS mg/dL 4.1 3.8 3.0   HEMOGLOBIN g/dL 7.0* 8.0* 7.7*    HEMATOCRIT % 22.4* 26.2* 24.8*     COVID19   Date Value Ref Range Status   06/06/2022 Not Detected Not Detected - Ref. Range Final     Lab Results   Component Value Date    HGBA1C 10.2 (H) 06/02/2022     RD to follow up per protocol.    Electronically signed by:  Mamta Gomez RD  06/07/22 07:28 EDT

## 2022-06-07 NOTE — PROGRESS NOTES
Hematology/Oncology Inpatient Progress Note    PATIENT NAME: Nieves Mc  : 1975  MRN: [id number]    Chief complaint: Uncontrolled Leukemia     History of present illness:      Nieves Mc is a 46 y.o. female who presented to [facility] on 2022 with complaints of cough for over a month, and SOB.  Patient also has a history of progressive fatigue but denies bleeding.  There is history of contact with someone with cough.  She denies fevers, chills, nausea or vomiting.  She complains of left upper abdominal discomfort at the site of her splenic enlargement.  She was sent to the ER due to tachycardia, significant anemia possible pneumonia.     22  Hematology/Oncology was consulted as she has a history of CML on to Tasigna twice a day.  Patient has been very noncompliant with her treatment.  She was seen in the office with a white count of more than 200,000, significant anemia with hemoglobin of 7.7 and a history of prolonged cough for the past 1 and half months.  Patient has not been seen in the office since end of 2022.  She has a history of noncompliance with her medications.         She  has a past medical history of Bone pain, Extremity pain, Leg pain, Migraine, Pulmonary embolism (HCC), and Vision loss.     PCP: Houston Oro MD    Subjective      Patient stated she feels better.  She had bronchoscopy today.    MEDICATIONS:    Scheduled Meds:  acetaZOLAMIDE, 250 mg, Oral, Daily  azithromycin, 250 mg, Oral, Q24H  cefTRIAXone, 2 g, Intravenous, Q24H  citalopram, 20 mg, Oral, Daily  furosemide, 40 mg, Intravenous, Daily  gabapentin, 300 mg, Oral, TID  hydroxyurea, 1,000 mg, Oral, BID  metoprolol succinate XL, 25 mg, Oral, Daily  predniSONE, 30 mg, Oral, Daily   Followed by  [START ON 2022] predniSONE, 20 mg, Oral, Daily   Followed by  [START ON 2022] predniSONE, 10 mg, Oral, Daily  sodium chloride, 10 mL, Intravenous, Q12H  sodium chloride, 10 mL,  "Intravenous, Q12H  traZODone, 50 mg, Oral, Nightly       Continuous Infusions:  insulin, 0-100 Units/hr       PRN Meds:  •  acetaminophen **OR** acetaminophen **OR** acetaminophen  •  ALPRAZolam  •  aluminum-magnesium hydroxide-simethicone  •  dextrose  •  dextrose  •  glucagon (human recombinant)  •  magnesium sulfate **OR** magnesium sulfate **OR** magnesium sulfate  •  melatonin  •  nitroglycerin  •  ondansetron **OR** ondansetron  •  oxyCODONE  •  [COMPLETED] Insert peripheral IV **AND** sodium chloride  •  sodium chloride  •  sodium chloride     ALLERGIES:    Allergies   Allergen Reactions   • Hydromorphone GI Intolerance     dilaudid   • Morphine Nausea And Vomiting   • Propofol Hives     A 12 point review of systems was conducted.  All pertinent positives and negatives are documented above.  Objective    VITALS:   /74 (BP Location: Right arm, Patient Position: Lying)   Pulse 89   Temp 98.4 °F (36.9 °C) (Oral)   Resp 18   Ht 162.6 cm (64\")   Wt 56.7 kg (125 lb)   SpO2 97%   BMI 21.46 kg/m²     PHYSICAL EXAM: (performed by MD)  Physical Exam  Constitutional:       General: She is in acute distress.      Appearance: She is ill-appearing. She is not toxic-appearing or diaphoretic.   HENT:      Head: Normocephalic and atraumatic.      Right Ear: External ear normal.      Left Ear: External ear normal.      Nose: Nose normal. No congestion or rhinorrhea.      Mouth/Throat:      Mouth: Mucous membranes are moist.      Pharynx: Oropharynx is clear. No oropharyngeal exudate or posterior oropharyngeal erythema.      Comments: Very poor dentition  Eyes:      General:         Right eye: No discharge.         Left eye: No discharge.      Pupils: Pupils are equal, round, and reactive to light.   Cardiovascular:      Rate and Rhythm: Regular rhythm. Tachycardia present.      Pulses: Normal pulses.      Heart sounds: Normal heart sounds. No murmur heard.    No friction rub. No gallop.   Pulmonary:      Effort: " No respiratory distress.      Breath sounds: No stridor. Wheezing and rhonchi present.      Comments: Labored.  The lungs are markedly diminished and there are bilateral fine crackles and wheezes throughout both sides.  Abdominal:      General: There is no distension.      Palpations: Abdomen is soft. There is no mass.      Tenderness: There is no abdominal tenderness.      Comments: Rounded, protuberant.  Soft and nontender.   Musculoskeletal:         General: Normal range of motion.      Cervical back: Normal range of motion and neck supple. No rigidity or tenderness.      Right lower leg: No edema.      Left lower leg: No edema.   Lymphadenopathy:      Cervical: No cervical adenopathy.   Skin:     General: Skin is warm.      Coloration: Skin is pale. Skin is not jaundiced.      Findings: No bruising or erythema.   Neurological:      General: No focal deficit present.      Mental Status: She is alert and oriented to person, place, and time.      Cranial Nerves: No cranial nerve deficit.   Psychiatric:         Mood and Affect: Mood normal.         Behavior: Behavior normal.         Thought Content: Thought content normal.         Judgment: Judgment normal.     I have reexamined the patient and the results are consistent with the previously documented exam. Meghann Lerma MD     RECENT LABS:    Lab Results (last 24 hours)     Procedure Component Value Units Date/Time    POC Glucose Once [[id number]]  (Abnormal) Collected: 06/07/22 1804    Specimen: Blood Updated: 06/07/22 1805     Glucose 414 mg/dL      Comment: Serial Number: [id number]:  [id number]       Beta Hydroxybutyrate Quantitative [[id number]] Collected: 06/07/22 1540    Specimen: Blood Updated: 06/07/22 1734    Pathology Consultation [[id number]] Resulted: 06/07/22 1628    Specimen: Blood Product Bag Updated: 06/07/22 1628     Final Diagnosis --     Non-febrile, non-hemolytic transfusion event with noted dyspnea/pulmonary edema, hypertension,  abdominal pain, and tachycardia as part of possible delayed transfusion reaction. Patient with history of CML, SOA, cough, and presumed pneumonia at time of admission. The symptoms observed a few days after the transfusion (2 units of pRBCs) could very likely be due mostly to the patient's clinical condition rather than the blood products. Patient noted to have cardiac symptoms (tachycardia, SOA, hypertension) were all documented prior to the transfusions. A CT of chest did show extensive ground glass airspace disease throughout but is not specific to TRALI and could be attributable to edema, infection, or ARDS (clinical implications not due directly to the transfusions). Also, TRALI is a more immediate effect seen during or within a few hours of transfusion (not days later). A NT-proBNP was done on 6/5/22 and was 6,007 whereas 4 months prior it had been 3,427. Since patient was admitted with cardiac symptoms it is very likely that the NT-proBNP pretransfusion was already closer to 6,000 than it was to the 4 month old 3,427. Possibly some of the symptoms could be due in part to TACO (transfusion associated circulatory overload). Continued close monitoring along with careful/thoughtful use of transfusions with slow infusion rate recommended.       Case Report --     Surgical Pathology Report                         Case: [id number]                                  Authorizing Provider:  Sobia Flannery MD         Collected:                                        Ordering Location:     [facility]       Received:            06/06/2022 03:58 PM                                 SURGICAL INPATIENT                                                           Pathologist:           Amador Hodges MD                                                           Specimen:    Blood Product Bag                                                                          Basic Metabolic Panel [[id number]]  (Abnormal) Collected:  06/07/22 1540    Specimen: Blood Updated: 06/07/22 1627     Glucose 601 mg/dL      BUN 15 mg/dL      Creatinine 0.60 mg/dL      Sodium 131 mmol/L      Potassium 3.9 mmol/L      Chloride 97 mmol/L      CO2 21.0 mmol/L      Calcium 8.1 mg/dL      BUN/Creatinine Ratio 25.0     Anion Gap 13.0 mmol/L      eGFR 112.3 mL/min/1.73      Comment: National Kidney Foundation and American Society of Nephrology (ASN) Task Force recommended calculation based on the Chronic Kidney Disease Epidemiology Collaboration (CKD-EPI) equation refit without adjustment for race.       Narrative:      GFR Normal >60  Chronic Kidney Disease <60  Kidney Failure <15      Hemoglobin & Hematocrit, Blood [[id number]]  (Abnormal) Collected: 06/07/22 1540    Specimen: Blood Updated: 06/07/22 1622     Hemoglobin 8.8 g/dL      Hematocrit 30.7 %     POC Glucose Once [[id number]]  (Abnormal) Collected: 06/07/22 1615    Specimen: Blood Updated: 06/07/22 1616     Glucose 541 mg/dL      Comment: Serial Number: [id number]:  [id number]       POC Glucose Once [[id number]]  (Abnormal) Collected: 06/07/22 1545    Specimen: Blood Updated: 06/07/22 1546     Glucose 578 mg/dL      Comment: Serial Number: [id number]:  [id number]       POC Glucose Once [[id number]]  (Abnormal) Collected: 06/07/22 1514    Specimen: Blood Updated: 06/07/22 1515     Glucose >600 mg/dL      Comment: Serial Number: [id number]:  [id number]       POC Glucose Once [[id number]]  (Abnormal) Collected: 06/07/22 1431    Specimen: Blood Updated: 06/07/22 1432     Glucose >600 mg/dL      Comment: Serial Number: [id number]:  [id number]       POC Glucose Once [[id number]]  (Abnormal) Collected: 06/07/22 1423    Specimen: Blood Updated: 06/07/22 1425     Glucose 575 mg/dL      Comment: Serial Number: [id number]:  [id number]       Blood Culture - Blood, Hand, Right [[id number]]  (Normal) Collected: 06/05/22 1348    Specimen: Blood from Hand, Right Updated: 06/07/22 4554     Blood  "Culture No growth at 2 days    Blood Culture - Blood, Arm, Right [[id number]]  (Normal) Collected: 06/05/22 1348    Specimen: Blood from Arm, Right Updated: 06/07/22 1402     Blood Culture No growth at 2 days    Non-gynecologic Cytology [[id number]] Collected: 06/06/22 1211    Specimen: Wash from Lung, R Updated: 06/07/22 1312     Case Report --     Medical Cytology Report                           Case: [id number]                                  Authorizing Provider:  Charlene Camara MD             Collected:           06/06/2022 12:11 PM          [facility]  Received:            06/06/2022 12:47 PM                                 SUITES                                                                       Pathologist:           Amador Jackson MD                                                            Specimen:    Lung, R, MARQUISE lung washing                                                                   Final Diagnosis --     Bilateral lung, bronchial washing with smears and cytospin:    Acute inflammation, mature squamous cells, pulmonary macrophages and bronchial epithelial cells     No malignancy identified     [facility] [place]        Gross Description --     1. Lung, R.  Received in carbowax and designated \"Bronchial washing\" are 35 mL of cloudy green colored fluid. Particulate matter is present. This specimen is processed as per protocol.            POC Glucose Once [[id number]]  (Abnormal) Collected: 06/07/22 1125    Specimen: Blood Updated: 06/07/22 1126     Glucose 407 mg/dL      Comment: Serial Number: [id number]:  [id number]       Respiratory Culture - Wash, Lung, R [[id number]] Collected: 06/06/22 1211    Specimen: Wash from Lung, R Updated: 06/07/22 1037     Respiratory Culture Moderate growth (3+) The culture consists of normal respiratory viktoriya. This is a preliminary report; final report to follow.     Gram Stain Moderate (3+) WBCs per low power field      Few (2+) " Epithelial cells per low power field      Few (2+) Mixed bacterial morphotypes seen on Gram Stain    AFB Culture - Wash, Lung, R [[id number]] Collected: 06/06/22 1211    Specimen: Wash from Lung, R Updated: 06/07/22 1001     AFB Stain No acid fast bacilli seen    POC Glucose Once [[id number]]  (Abnormal) Collected: 06/07/22 0712    Specimen: Blood Updated: 06/07/22 0713     Glucose 312 mg/dL      Comment: Serial Number: [id number]:  [id number]       Manual Differential [[id number]]  (Abnormal) Collected: 06/07/22 0328    Specimen: Blood Updated: 06/07/22 0549     Neutrophil % 41.0 %      Lymphocyte % 7.0 %      Monocyte % 14.0 %      Eosinophil % 7.0 %      Basophil % 8.0 %      Bands %  9.0 %      Metamyelocyte % 3.0 %      Myelocyte % 3.0 %      Promyelocyte % 2.0 %      Atypical Lymphocyte % 1.0 %      Blasts % 5.0 %      Neutrophils Absolute 39.70 10*3/mm3      Lymphocytes Absolute 6.35 10*3/mm3      Monocytes Absolute 11.12 10*3/mm3      Eosinophils Absolute 5.56 10*3/mm3      Basophils Absolute 6.35 10*3/mm3      nRBC 3.0 /100 WBC      Anisocytosis Slight/1+     Microcytes Slight/1+     Poikilocytes Slight/1+     Polychromasia Slight/1+     WBC Morphology Normal     Platelet Morphology Normal    Narrative:      Reviewed by Pathologist within the past 30 days on 6.2.22 .    CBC & Differential [[id number]]  (Abnormal) Collected: 06/07/22 0328    Specimen: Blood Updated: 06/07/22 0549    Narrative:      The following orders were created for panel order CBC & Differential.  Procedure                               Abnormality         Status                     ---------                               -----------         ------                     CBC Auto Differential[[id number]]        Abnormal            Final result               Scan Slide[[id number]]                                       Final result                 Please view results for these tests on the individual orders.    Scan Slide [[id number]]  Collected: 06/07/22 0328    Specimen: Blood Updated: 06/07/22 0549     Scan Slide --     Comment: See Manual Differential Results       CBC Auto Differential [[id number]]  (Abnormal) Collected: 06/07/22 0328    Specimen: Blood Updated: 06/07/22 0548     WBC 79.40 10*3/mm3      RBC 2.95 10*6/mm3      Hemoglobin 7.0 g/dL      Hematocrit 22.4 %      MCV 75.9 fL      MCH 23.7 pg      MCHC 31.2 g/dL      RDW 17.9 %      RDW-SD 47.3 fl      MPV 8.1 fL      Platelets 305 10*3/mm3     Narrative:      The previously reported component NRBC is no longer being reported. Previous result was 0.6 /100 WBC (Reference Range: 0.0-0.2 /100 WBC) on 6/7/2022 at 0407 EDT.    Comprehensive Metabolic Panel [[id number]]  (Abnormal) Collected: 06/07/22 0328    Specimen: Blood Updated: 06/07/22 0438     Glucose 244 mg/dL      BUN 11 mg/dL      Creatinine 0.49 mg/dL      Sodium 135 mmol/L      Potassium 3.8 mmol/L      Chloride 101 mmol/L      CO2 24.0 mmol/L      Calcium 7.8 mg/dL      Total Protein 5.8 g/dL      Albumin 3.10 g/dL      ALT (SGPT) <5 U/L      AST (SGOT) 5 U/L      Alkaline Phosphatase 908 U/L      Total Bilirubin 0.5 mg/dL      Globulin 2.7 gm/dL      A/G Ratio 1.1 g/dL      BUN/Creatinine Ratio 22.4     Anion Gap 10.0 mmol/L      eGFR 117.9 mL/min/1.73      Comment: National Kidney Foundation and American Society of Nephrology (ASN) Task Force recommended calculation based on the Chronic Kidney Disease Epidemiology Collaboration (CKD-EPI) equation refit without adjustment for race.       Narrative:      GFR Normal >60  Chronic Kidney Disease <60  Kidney Failure <15      Phosphorus [[id number]]  (Normal) Collected: 06/07/22 0328    Specimen: Blood Updated: 06/07/22 0417     Phosphorus 4.1 mg/dL     Magnesium [[id number]]  (Normal) Collected: 06/07/22 0328    Specimen: Blood Updated: 06/07/22 0417     Magnesium 2.1 mg/dL     C-reactive Protein [[id number]]  (Abnormal) Collected: 06/07/22 0328    Specimen: Blood Updated: 06/07/22  0417     C-Reactive Protein 4.28 mg/dL     POC Glucose Once [[id number]]  (Abnormal) Collected: 06/06/22 2052    Specimen: Blood Updated: 06/06/22 2053     Glucose 253 mg/dL      Comment: Serial Number: [id number]:  [id number]             IMAGING REVIEWED:  No radiology results for the last day    Assessment & Plan     1.  Chronic myelogenous leukemia on hydroxyurea.  The white blood cell count, while still markedly elevated, has come down significantly since the institution of the hydroxyurea.  It has had some impact on the hemoglobin and we will continue to monitor.  Renal function was reviewed and continues to be adequate.  Continue to monitor her CBC closely  2.  Reviewed the images and the report of this recent scans.  While pulmonary embolism was not documented, there is extensive interstitial infiltrate involving both lungs.  The picture is quite concerning, particularly because her oxygen saturation has been dropping.  Status post bronchoscopy on 6/7/2022.  Await culture results.  3.Daily CBCs  4.  We will continue to follow    Discussed with patient    Electronically signed by Meghann Lerma MD, 06/08/22, 6:07 PM EDT.

## 2022-06-08 ENCOUNTER — APPOINTMENT (OUTPATIENT)
Dept: GENERAL RADIOLOGY | Facility: HOSPITAL | Age: 47
End: 2022-06-08

## 2022-06-08 LAB
ANION GAP SERPL CALCULATED.3IONS-SCNC: 10 MMOL/L (ref 5–15)
ANISOCYTOSIS BLD QL: ABNORMAL
B-OH-BUTYR SERPL-SCNC: 0.09 MMOL/L (ref 0.02–0.27)
BACTERIA SPEC RESP CULT: NORMAL
BASOPHILS # BLD MANUAL: 16.5 10*3/MM3 (ref 0–0.2)
BASOPHILS NFR BLD MANUAL: 20 % (ref 0–1.5)
BH BB BLOOD EXPIRATION DATE: NORMAL
BH BB BLOOD TYPE BARCODE: 9500
BH BB DISPENSE STATUS: NORMAL
BH BB PRODUCT CODE: NORMAL
BH BB UNIT NUMBER: NORMAL
BLASTS NFR BLD MANUAL: 14 % (ref 0–0)
BUN SERPL-MCNC: 14 MG/DL (ref 6–20)
BUN/CREAT SERPL: 35.9 (ref 7–25)
CALCIUM SPEC-SCNC: 8 MG/DL (ref 8.6–10.5)
CHLORIDE SERPL-SCNC: 106 MMOL/L (ref 98–107)
CO2 SERPL-SCNC: 22 MMOL/L (ref 22–29)
CREAT SERPL-MCNC: 0.39 MG/DL (ref 0.57–1)
CROSSMATCH INTERPRETATION: NORMAL
CRP SERPL-MCNC: 2.29 MG/DL (ref 0–0.5)
DEPRECATED RDW RBC AUTO: 49.9 FL (ref 37–54)
EGFRCR SERPLBLD CKD-EPI 2021: 124.5 ML/MIN/1.73
EOSINOPHIL # BLD MANUAL: 5.78 10*3/MM3 (ref 0–0.4)
EOSINOPHIL NFR BLD MANUAL: 7 % (ref 0.3–6.2)
ERYTHROCYTE [DISTWIDTH] IN BLOOD BY AUTOMATED COUNT: 18.3 % (ref 12.3–15.4)
GLUCOSE BLDC GLUCOMTR-MCNC: 102 MG/DL (ref 70–105)
GLUCOSE BLDC GLUCOMTR-MCNC: 102 MG/DL (ref 70–105)
GLUCOSE BLDC GLUCOMTR-MCNC: 106 MG/DL (ref 70–105)
GLUCOSE BLDC GLUCOMTR-MCNC: 119 MG/DL (ref 70–105)
GLUCOSE BLDC GLUCOMTR-MCNC: 301 MG/DL (ref 70–105)
GLUCOSE BLDC GLUCOMTR-MCNC: 327 MG/DL (ref 70–105)
GLUCOSE BLDC GLUCOMTR-MCNC: 78 MG/DL (ref 70–105)
GLUCOSE BLDC GLUCOMTR-MCNC: 79 MG/DL (ref 70–105)
GLUCOSE BLDC GLUCOMTR-MCNC: 85 MG/DL (ref 70–105)
GLUCOSE BLDC GLUCOMTR-MCNC: 90 MG/DL (ref 70–105)
GLUCOSE BLDC GLUCOMTR-MCNC: 98 MG/DL (ref 70–105)
GLUCOSE SERPL-MCNC: 99 MG/DL (ref 65–99)
GRAM STN SPEC: NORMAL
HCT VFR BLD AUTO: 26.2 % (ref 34–46.6)
HGB BLD-MCNC: 8 G/DL (ref 12–15.9)
LYMPHOCYTES # BLD MANUAL: 6.6 10*3/MM3 (ref 0.7–3.1)
LYMPHOCYTES NFR BLD MANUAL: 8 % (ref 5–12)
MAGNESIUM SERPL-MCNC: 2 MG/DL (ref 1.6–2.6)
MCH RBC QN AUTO: 23.8 PG (ref 26.6–33)
MCHC RBC AUTO-ENTMCNC: 30.4 G/DL (ref 31.5–35.7)
MCV RBC AUTO: 78.4 FL (ref 79–97)
METAMYELOCYTES NFR BLD MANUAL: 3 % (ref 0–0)
MONOCYTES # BLD: 6.6 10*3/MM3 (ref 0.1–0.9)
MYELOCYTES NFR BLD MANUAL: 7 % (ref 0–0)
NEUTROPHILS # BLD AUTO: 25.58 10*3/MM3 (ref 1.7–7)
NEUTROPHILS NFR BLD MANUAL: 23 % (ref 42.7–76)
NEUTS BAND NFR BLD MANUAL: 8 % (ref 0–5)
OSMOLALITY SERPL: 293 MOSM/KG (ref 275–295)
PHOSPHATE SERPL-MCNC: 3.7 MG/DL (ref 2.5–4.5)
PLAT MORPH BLD: NORMAL
PLATELET # BLD AUTO: 309 10*3/MM3 (ref 140–450)
PMV BLD AUTO: 7.8 FL (ref 6–12)
POIKILOCYTOSIS BLD QL SMEAR: ABNORMAL
POLYCHROMASIA BLD QL SMEAR: ABNORMAL
POTASSIUM SERPL-SCNC: 3.8 MMOL/L (ref 3.5–5.2)
PROMYELOCYTES NFR BLD MANUAL: 2 % (ref 0–0)
RBC # BLD AUTO: 3.35 10*6/MM3 (ref 3.77–5.28)
SCAN SLIDE: NORMAL
SODIUM SERPL-SCNC: 138 MMOL/L (ref 136–145)
UNIT  ABO: NORMAL
UNIT  RH: NORMAL
VARIANT LYMPHS NFR BLD MANUAL: 8 % (ref 19.6–45.3)
WBC MORPH BLD: NORMAL
WBC NRBC COR # BLD: 82.5 10*3/MM3 (ref 3.4–10.8)

## 2022-06-08 PROCEDURE — 25010000002 CEFTRIAXONE PER 250 MG: Performed by: INTERNAL MEDICINE

## 2022-06-08 PROCEDURE — 99232 SBSQ HOSP IP/OBS MODERATE 35: CPT | Performed by: INTERNAL MEDICINE

## 2022-06-08 PROCEDURE — 63710000001 PREDNISONE PER 5 MG: Performed by: INTERNAL MEDICINE

## 2022-06-08 PROCEDURE — 63710000001 INSULIN LISPRO (HUMAN) PER 5 UNITS: Performed by: INTERNAL MEDICINE

## 2022-06-08 PROCEDURE — 80048 BASIC METABOLIC PNL TOTAL CA: CPT | Performed by: INTERNAL MEDICINE

## 2022-06-08 PROCEDURE — 83930 ASSAY OF BLOOD OSMOLALITY: CPT | Performed by: INTERNAL MEDICINE

## 2022-06-08 PROCEDURE — 86140 C-REACTIVE PROTEIN: CPT | Performed by: INTERNAL MEDICINE

## 2022-06-08 PROCEDURE — 84100 ASSAY OF PHOSPHORUS: CPT | Performed by: INTERNAL MEDICINE

## 2022-06-08 PROCEDURE — 85007 BL SMEAR W/DIFF WBC COUNT: CPT | Performed by: INTERNAL MEDICINE

## 2022-06-08 PROCEDURE — 63710000001 PREDNISONE PER 1 MG: Performed by: INTERNAL MEDICINE

## 2022-06-08 PROCEDURE — 82962 GLUCOSE BLOOD TEST: CPT

## 2022-06-08 PROCEDURE — 71045 X-RAY EXAM CHEST 1 VIEW: CPT

## 2022-06-08 PROCEDURE — 83735 ASSAY OF MAGNESIUM: CPT | Performed by: INTERNAL MEDICINE

## 2022-06-08 PROCEDURE — 25010000002 FUROSEMIDE PER 20 MG: Performed by: INTERNAL MEDICINE

## 2022-06-08 PROCEDURE — 99231 SBSQ HOSP IP/OBS SF/LOW 25: CPT | Performed by: INTERNAL MEDICINE

## 2022-06-08 PROCEDURE — 63710000001 INSULIN GLARGINE PER 5 UNITS: Performed by: INTERNAL MEDICINE

## 2022-06-08 PROCEDURE — 85025 COMPLETE CBC W/AUTO DIFF WBC: CPT | Performed by: INTERNAL MEDICINE

## 2022-06-08 RX ORDER — NICOTINE POLACRILEX 4 MG
15 LOZENGE BUCCAL
Status: DISCONTINUED | OUTPATIENT
Start: 2022-06-08 | End: 2022-06-08

## 2022-06-08 RX ORDER — INSULIN LISPRO 100 [IU]/ML
0-24 INJECTION, SOLUTION INTRAVENOUS; SUBCUTANEOUS AS NEEDED
Status: DISCONTINUED | OUTPATIENT
Start: 2022-06-08 | End: 2022-06-10 | Stop reason: HOSPADM

## 2022-06-08 RX ORDER — NICOTINE POLACRILEX 4 MG
15 LOZENGE BUCCAL
Status: DISCONTINUED | OUTPATIENT
Start: 2022-06-08 | End: 2022-06-10 | Stop reason: HOSPADM

## 2022-06-08 RX ORDER — DEXTROSE MONOHYDRATE 25 G/50ML
25 INJECTION, SOLUTION INTRAVENOUS
Status: DISCONTINUED | OUTPATIENT
Start: 2022-06-08 | End: 2022-06-08

## 2022-06-08 RX ORDER — OLANZAPINE 10 MG/2ML
1 INJECTION, POWDER, LYOPHILIZED, FOR SOLUTION INTRAMUSCULAR
Status: DISCONTINUED | OUTPATIENT
Start: 2022-06-08 | End: 2022-06-10 | Stop reason: HOSPADM

## 2022-06-08 RX ORDER — DEXTROSE MONOHYDRATE 25 G/50ML
25 INJECTION, SOLUTION INTRAVENOUS
Status: DISCONTINUED | OUTPATIENT
Start: 2022-06-08 | End: 2022-06-10 | Stop reason: HOSPADM

## 2022-06-08 RX ORDER — INSULIN LISPRO 100 [IU]/ML
0-14 INJECTION, SOLUTION INTRAVENOUS; SUBCUTANEOUS
Status: DISCONTINUED | OUTPATIENT
Start: 2022-06-08 | End: 2022-06-08

## 2022-06-08 RX ORDER — OLANZAPINE 10 MG/2ML
1 INJECTION, POWDER, LYOPHILIZED, FOR SOLUTION INTRAMUSCULAR
Status: DISCONTINUED | OUTPATIENT
Start: 2022-06-08 | End: 2022-06-08

## 2022-06-08 RX ORDER — INSULIN LISPRO 100 [IU]/ML
0-14 INJECTION, SOLUTION INTRAVENOUS; SUBCUTANEOUS AS NEEDED
Status: DISCONTINUED | OUTPATIENT
Start: 2022-06-08 | End: 2022-06-08

## 2022-06-08 RX ORDER — INSULIN LISPRO 100 [IU]/ML
6 INJECTION, SOLUTION INTRAVENOUS; SUBCUTANEOUS
Status: DISCONTINUED | OUTPATIENT
Start: 2022-06-08 | End: 2022-06-10 | Stop reason: HOSPADM

## 2022-06-08 RX ORDER — DEXTROSE, SODIUM CHLORIDE, SODIUM LACTATE, POTASSIUM CHLORIDE, AND CALCIUM CHLORIDE 5; .6; .31; .03; .02 G/100ML; G/100ML; G/100ML; G/100ML; G/100ML
100 INJECTION, SOLUTION INTRAVENOUS CONTINUOUS
Status: DISCONTINUED | OUTPATIENT
Start: 2022-06-08 | End: 2022-06-08

## 2022-06-08 RX ORDER — INSULIN LISPRO 100 [IU]/ML
0-24 INJECTION, SOLUTION INTRAVENOUS; SUBCUTANEOUS
Status: DISCONTINUED | OUTPATIENT
Start: 2022-06-09 | End: 2022-06-10 | Stop reason: HOSPADM

## 2022-06-08 RX ADMIN — Medication 10 ML: at 21:00

## 2022-06-08 RX ADMIN — ALPRAZOLAM 0.25 MG: 0.25 TABLET ORAL at 20:59

## 2022-06-08 RX ADMIN — OXYCODONE 10 MG: 5 TABLET ORAL at 17:55

## 2022-06-08 RX ADMIN — OXYCODONE 10 MG: 5 TABLET ORAL at 11:07

## 2022-06-08 RX ADMIN — Medication 10 ML: at 21:01

## 2022-06-08 RX ADMIN — INSULIN LISPRO 10 UNITS: 100 INJECTION, SOLUTION INTRAVENOUS; SUBCUTANEOUS at 11:29

## 2022-06-08 RX ADMIN — AZITHROMYCIN MONOHYDRATE 250 MG: 250 TABLET ORAL at 08:08

## 2022-06-08 RX ADMIN — GABAPENTIN 300 MG: 300 CAPSULE ORAL at 20:59

## 2022-06-08 RX ADMIN — CITALOPRAM HYDROBROMIDE 20 MG: 20 TABLET ORAL at 08:08

## 2022-06-08 RX ADMIN — ACETAZOLAMIDE 250 MG: 250 TABLET ORAL at 08:08

## 2022-06-08 RX ADMIN — Medication 10 ML: at 08:08

## 2022-06-08 RX ADMIN — GABAPENTIN 300 MG: 300 CAPSULE ORAL at 15:45

## 2022-06-08 RX ADMIN — OXYCODONE 10 MG: 5 TABLET ORAL at 05:53

## 2022-06-08 RX ADMIN — PREDNISONE 30 MG: 20 TABLET ORAL at 08:08

## 2022-06-08 RX ADMIN — HYDROXYUREA 1000 MG: 500 CAPSULE ORAL at 21:03

## 2022-06-08 RX ADMIN — TRAZODONE HYDROCHLORIDE 50 MG: 50 TABLET ORAL at 20:59

## 2022-06-08 RX ADMIN — FUROSEMIDE 40 MG: 10 INJECTION, SOLUTION INTRAMUSCULAR; INTRAVENOUS at 08:08

## 2022-06-08 RX ADMIN — HYDROXYUREA 1000 MG: 500 CAPSULE ORAL at 10:21

## 2022-06-08 RX ADMIN — HYDROXYUREA 1000 MG: 500 CAPSULE ORAL at 01:03

## 2022-06-08 RX ADMIN — INSULIN LISPRO 6 UNITS: 100 INJECTION, SOLUTION INTRAVENOUS; SUBCUTANEOUS at 20:59

## 2022-06-08 RX ADMIN — INSULIN LISPRO 10 UNITS: 100 INJECTION, SOLUTION INTRAVENOUS; SUBCUTANEOUS at 16:49

## 2022-06-08 RX ADMIN — INSULIN GLARGINE 20 UNITS: 100 INJECTION, SOLUTION SUBCUTANEOUS at 08:06

## 2022-06-08 RX ADMIN — METOPROLOL SUCCINATE 25 MG: 25 TABLET, FILM COATED, EXTENDED RELEASE ORAL at 08:08

## 2022-06-08 RX ADMIN — OXYCODONE 10 MG: 5 TABLET ORAL at 22:08

## 2022-06-08 RX ADMIN — CEFTRIAXONE 2 G: 2 INJECTION, POWDER, FOR SOLUTION INTRAMUSCULAR; INTRAVENOUS at 14:13

## 2022-06-08 RX ADMIN — GABAPENTIN 300 MG: 300 CAPSULE ORAL at 08:08

## 2022-06-08 NOTE — CASE MANAGEMENT/SOCIAL WORK
Continued Stay Note  ZOHAIB Amor     Patient Name: Nieves Mc  MRN: [id number]  Today's Date: 6/8/2022    Admit Date: 6/1/2022     Discharge Plan     Row Name 06/08/22 1453       Plan    Plan Comments DC Barriers: HGB 8.0, blood transfusion ordered, WBC 82.50, pulmonology following. Anticipate dc home once medically cleared.           Phone communication or documentation only - no physical contact with patient or family.    Jaida Charles RN     Office Phone: [phone]  Office Cell: [phone]

## 2022-06-08 NOTE — PROGRESS NOTES
[facility] Medicine Services Daily Progress Note    Patient Name: Nieves Mc  : 1975  MRN: [id number]  Primary Care Physician:  Houston Oro MD  Date of admission: 2022      Subjective      Chief Complaint: Shortness of breath     Patient Reports she feels okay.   Oxygen turned down to 0 L.  Tachycardia is improving.    She was severely hyperglycemic due to steroid use.  Several doses of IV insulin had to be given.  No response glucose over 600 due to steroids.  Moved to PCU for insulin drip.  Glucose now much better     ROS negative except as above    Objective      Vitals:   Temp:  [97.6 °F (36.4 °C)-98.4 °F (36.9 °C)] 98 °F (36.7 °C)  Heart Rate:  [] 95  Resp:  [16-20] 20  BP: (118-138)/(59-88) 138/88  Flow (L/min):  [2] 2    Vitals reviewed.   Constitutional:       Comments: On room air today    HENT:      Head: Normocephalic.      Nose: Nose normal.   Cardiovascular:      Rate and Rhythm: Regular rhythm.  Tachycardia resolved     Pulses: Normal pulses.   Pulmonary:      Effort: Pulmonary effort is normal.   Abdominal:      General: There is ascites.      Comments: Significant splenomegaly   Musculoskeletal:         General: Normal range of motion.      Cervical back: Normal range of motion.   Skin:     General: Skin is warm.      Capillary Refill: Capillary refill takes less than 2 seconds.   Neurological:      General: No focal deficit present.   Psychiatric:         Mood and Affect: Mood normal.                        Result Review    Result Review:  I have personally reviewed the results from the time of this admission to 2022 19:02 EDT and agree with these findings:  [x]  Laboratory  []  Microbiology  []  Radiology  []  EKG/Telemetry   []  Cardiology/Vascular   []  Pathology  []  Old records  []  Other:  Most notable findings include: Glucose down to 100 from 600s             Assessment & Plan    46-year-old lady on chemotherapy presents with sepsis  pneumonia shortness of breath tachycardia anemia     Active Hospital Problems:          Active Hospital Problems     Diagnosis     • **CML (chronic myelocytic leukemia) (HCC)     • Tachycardia     • Severe anemia     • Medically noncompliant     • Depression with anxiety        Plan:       CML with associated chronic pain  -WBCs improving  -Per oncology note, currently in chronic phase based on her bone marrow  -She admits noncompliance with treatment (tasigna 300 mg p.o. twice daily) since February 2022.  -Continue home Percocet  -Oncology consulted appreciate assistance      Severe anemia  -Hemoglobin stable today continue to monitor  -CT of the abdomen and pelvis shows no sign of GI bleed  -Monitor H&H  -Type and cross  -1 unit PRBC ordered for transfusion in ED  -Hold home Xarelto for now  -Another unit given June 2  Hemoglobin much better on June 8.  No longer requiring transfusions     Diabetes mellitus type 2  -Hemoglobin A1c ordered  -Patient also reports noncompliance with insulin  -Blood glucose 533 on arrival to ED decreased to 477 after receiving 8 units of regular insulin  -Accu-Cheks AC at bedtime  -Continue home NPH  -Continue home gabapentin  -SSI ordered  -Started Lantus 10 units daily  -Severe steroid-induced hyperglycemia.    Patient was in the 600s.  Moved to PCU for insulin drip.  Now much better.  In the low 100s.  Resume insulin and sliding scale  Switch to high-dose sliding scale.  20 units of Lantus ordered.     Depression with anxiety  -Chronic, stable  -Continue home alprazolam, Celexa, trazodone     Tachycardia  -Resolved on June 8     Persistent nausea vomiting  Improved underneath      CT PE shows bilateral opacities pneumonia with possible ARDS versus opportunistic infection.  Echo pending.  Appreciate pulmonary assistance.  Possible transfusion reaction on admission as her symptoms got worse posttransfusion.    Steroids initiated     Status post bronchoscopy on June 6 follow-up  results     DVT prophylaxis:  Mechanical DVT prophylaxis orders are present.     CODE STATUS:    Code Status (Patient has no pulse and is not breathing): CPR (Attempt to Resuscitate)  Medical Interventions (Patient has pulse or is breathing): Full Support     Admission Status:  I believe this patient meets inpatient status.     I discussed the patient's findings and my recommendations with patient.     This patient has been examined wearing appropriate Personal Protective Equipment   06/08/22      Electronically signed by Villa Patel MD, 06/08/22, 19:02 EDT.  [facility]ist Team

## 2022-06-08 NOTE — PROGRESS NOTES
Hematology/Oncology Inpatient Progress Note    PATIENT NAME: Nieves Mc  : 1975  MRN: [id number]    Chief complaint: Uncontrolled Leukemia     History of present illness:      Nieves Mc is a 46 y.o. female who presented to [facility] on 2022 with complaints of cough for over a month, and SOB.  Patient also has a history of progressive fatigue but denies bleeding.  There is history of contact with someone with cough.  She denies fevers, chills, nausea or vomiting.  She complains of left upper abdominal discomfort at the site of her splenic enlargement.  She was sent to the ER due to tachycardia, significant anemia possible pneumonia.     22  Hematology/Oncology was consulted as she has a history of CML on to Tasigna twice a day.  Patient has been very noncompliant with her treatment.  She was seen in the office with a white count of more than 200,000, significant anemia with hemoglobin of 7.7 and a history of prolonged cough for the past 1 and half months.  Patient has not been seen in the office since end of 2022.  She has a history of noncompliance with her medications.         She  has a past medical history of Bone pain, Extremity pain, Leg pain, Migraine, Pulmonary embolism (HCC), and Vision loss.     PCP: Houston Oro MD    Subjective      Patient received blood transfusion overnight.  She feels slightly better.  Breathing is easier    MEDICATIONS:    Scheduled Meds:  acetaZOLAMIDE, 250 mg, Oral, Daily  azithromycin, 250 mg, Oral, Q24H  cefTRIAXone, 2 g, Intravenous, Q24H  citalopram, 20 mg, Oral, Daily  furosemide, 40 mg, Intravenous, Daily  gabapentin, 300 mg, Oral, TID  hydroxyurea, 1,000 mg, Oral, BID  insulin glargine, 20 Units, Subcutaneous, QAM  insulin lispro, 0-14 Units, Subcutaneous, TID AC  metoprolol succinate XL, 25 mg, Oral, Daily  [START ON 2022] predniSONE, 20 mg, Oral, Daily   Followed by  [START ON 2022] predniSONE, 10 mg,  "Oral, Daily  sodium chloride, 10 mL, Intravenous, Q12H  sodium chloride, 10 mL, Intravenous, Q12H  traZODone, 50 mg, Oral, Nightly       Continuous Infusions:      PRN Meds:  •  acetaminophen **OR** acetaminophen **OR** acetaminophen  •  ALPRAZolam  •  aluminum-magnesium hydroxide-simethicone  •  dextrose  •  dextrose  •  glucagon (human recombinant)  •  insulin lispro **AND** insulin lispro  •  magnesium sulfate **OR** magnesium sulfate **OR** magnesium sulfate  •  melatonin  •  nitroglycerin  •  ondansetron **OR** ondansetron  •  oxyCODONE  •  [COMPLETED] Insert peripheral IV **AND** sodium chloride  •  sodium chloride  •  sodium chloride     ALLERGIES:    Allergies   Allergen Reactions   • Hydromorphone GI Intolerance     dilaudid   • Morphine Nausea And Vomiting   • Propofol Hives     A 12 point review of systems was conducted.  All pertinent positives and negatives are documented above.  Objective    VITALS:   /88   Pulse 95   Temp 98 °F (36.7 °C) (Oral)   Resp 20   Ht 162.6 cm (64\")   Wt 56.7 kg (125 lb)   SpO2 98%   BMI 21.46 kg/m²     PHYSICAL EXAM: (performed by MD)  Physical Exam  Constitutional:       General: She is in acute distress.      Appearance: She is ill-appearing. She is not toxic-appearing or diaphoretic.   HENT:      Head: Normocephalic and atraumatic.      Right Ear: External ear normal.      Left Ear: External ear normal.      Nose: Nose normal. No congestion or rhinorrhea.      Mouth/Throat:      Mouth: Mucous membranes are moist.      Pharynx: Oropharynx is clear. No oropharyngeal exudate or posterior oropharyngeal erythema.      Comments: Very poor dentition  Eyes:      General:         Right eye: No discharge.         Left eye: No discharge.      Pupils: Pupils are equal, round, and reactive to light.   Cardiovascular:      Rate and Rhythm: Regular rhythm. Tachycardia present.      Pulses: Normal pulses.      Heart sounds: Normal heart sounds. No murmur heard.    No friction " rub. No gallop.   Pulmonary:      Effort: No respiratory distress.      Breath sounds: No stridor. Wheezing and rhonchi present.      Comments: Labored.  The lungs are markedly diminished and there are bilateral fine crackles and wheezes throughout both sides.  Abdominal:      General: There is no distension.      Palpations: Abdomen is soft. There is no mass.      Tenderness: There is no abdominal tenderness.      Comments: Rounded, protuberant.  Soft and nontender.   Musculoskeletal:         General: Normal range of motion.      Cervical back: Normal range of motion and neck supple. No rigidity or tenderness.      Right lower leg: No edema.      Left lower leg: No edema.   Lymphadenopathy:      Cervical: No cervical adenopathy.   Skin:     General: Skin is warm.      Coloration: Skin is pale. Skin is not jaundiced.      Findings: No bruising or erythema.   Neurological:      General: No focal deficit present.      Mental Status: She is alert and oriented to person, place, and time.      Cranial Nerves: No cranial nerve deficit.   Psychiatric:         Mood and Affect: Mood normal.         Behavior: Behavior normal.         Thought Content: Thought content normal.         Judgment: Judgment normal.     I have reexamined the patient and the results are consistent with the previously documented exam. Meghannmei Lerma MD     RECENT LABS:    Lab Results (last 24 hours)     Procedure Component Value Units Date/Time    POC Glucose Once [[id number]]  (Abnormal) Collected: 06/08/22 1616    Specimen: Blood Updated: 06/08/22 1621     Glucose 327 mg/dL      Comment: Serial Number: [id number]:  [id number]       Blood Culture - Blood, Hand, Right [[id number]]  (Normal) Collected: 06/05/22 1348    Specimen: Blood from Hand, Right Updated: 06/08/22 1403     Blood Culture No growth at 3 days    Blood Culture - Blood, Arm, Right [[id number]]  (Normal) Collected: 06/05/22 1348    Specimen: Blood from Arm, Right Updated:  06/08/22 1403     Blood Culture No growth at 3 days    POC Glucose Once [[id number]]  (Abnormal) Collected: 06/08/22 1111    Specimen: Blood Updated: 06/08/22 1112     Glucose 301 mg/dL      Comment: Serial Number: [id number]:  [id number]       Respiratory Culture - Wash, Lung, R [[id number]] Collected: 06/06/22 1211    Specimen: Wash from Lung, R Updated: 06/08/22 0954     Respiratory Culture Moderate growth (3+) Normal respiratory viktoriya. No S. aureus or Pseudomonas aeruginosa detected. Final report.     Gram Stain Moderate (3+) WBCs per low power field      Few (2+) Epithelial cells per low power field      Few (2+) Mixed bacterial morphotypes seen on Gram Stain    POC Glucose Once [[id number]]  (Abnormal) Collected: 06/08/22 0835    Specimen: Blood Updated: 06/08/22 0836     Glucose 119 mg/dL      Comment: Serial Number: [id number]:  [id number]       POC Glucose Once [[id number]]  (Normal) Collected: 06/08/22 0709    Specimen: Blood Updated: 06/08/22 0710     Glucose 102 mg/dL      Comment: Serial Number: [id number]:  [id number]       POC Glucose Once [[id number]]  (Normal) Collected: 06/08/22 0625    Specimen: Blood Updated: 06/08/22 0627     Glucose 102 mg/dL      Comment: Serial Number: [id number]:  [id number]       Manual Differential [[id number]]  (Abnormal) Collected: 06/08/22 0358    Specimen: Blood Updated: 06/08/22 0616     Neutrophil % 23.0 %      Lymphocyte % 8.0 %      Monocyte % 8.0 %      Eosinophil % 7.0 %      Basophil % 20.0 %      Bands %  8.0 %      Metamyelocyte % 3.0 %      Myelocyte % 7.0 %      Promyelocyte % 2.0 %      Blasts % 14.0 %      Neutrophils Absolute 25.58 10*3/mm3      Lymphocytes Absolute 6.60 10*3/mm3      Monocytes Absolute 6.60 10*3/mm3      Eosinophils Absolute 5.78 10*3/mm3      Basophils Absolute 16.50 10*3/mm3      Anisocytosis Slight/1+     Poikilocytes Slight/1+     Polychromasia Slight/1+     WBC Morphology Normal     Platelet Morphology Normal     Narrative:      Reviewed by Pathologist within the past 30 days on 06/03/2022 .    CBC & Differential [[id number]]  (Abnormal) Collected: 06/08/22 0358    Specimen: Blood Updated: 06/08/22 0616    Narrative:      The following orders were created for panel order CBC & Differential.  Procedure                               Abnormality         Status                     ---------                               -----------         ------                     CBC Auto Differential[[id number]]        Abnormal            Final result               Scan Slide[[id number]]                                       Final result                 Please view results for these tests on the individual orders.    Scan Slide [[id number]] Collected: 06/08/22 0358    Specimen: Blood Updated: 06/08/22 0616     Scan Slide --     Comment: See Manual Differential Results       CBC Auto Differential [[id number]]  (Abnormal) Collected: 06/08/22 0358    Specimen: Blood Updated: 06/08/22 0616     WBC 82.50 10*3/mm3      RBC 3.35 10*6/mm3      Hemoglobin 8.0 g/dL      Hematocrit 26.2 %      MCV 78.4 fL      MCH 23.8 pg      MCHC 30.4 g/dL      RDW 18.3 %      RDW-SD 49.9 fl      MPV 7.8 fL      Platelets 309 10*3/mm3     Narrative:      The previously reported component NRBC is no longer being reported. Previous result was 0.8 /100 WBC (Reference Range: 0.0-0.2 /100 WBC) on 6/8/2022 at 0504 EDT.    POC Glucose Once [[id number]]  (Normal) Collected: 06/08/22 0522    Specimen: Blood Updated: 06/08/22 0523     Glucose 85 mg/dL      Comment: Serial Number: [id number]:  [id number]       Osmolality, Serum [[id number]]  (Normal) Collected: 06/08/22 0358    Specimen: Blood Updated: 06/08/22 0518     Osmolality 293 mOsm/kg     Basic Metabolic Panel [[id number]]  (Abnormal) Collected: 06/08/22 0358    Specimen: Blood Updated: 06/08/22 0516     Glucose 99 mg/dL      BUN 14 mg/dL      Creatinine 0.39 mg/dL      Sodium 138 mmol/L      Potassium 3.8 mmol/L       Chloride 106 mmol/L      CO2 22.0 mmol/L      Calcium 8.0 mg/dL      BUN/Creatinine Ratio 35.9     Anion Gap 10.0 mmol/L      eGFR 124.5 mL/min/1.73      Comment: National Kidney Foundation and American Society of Nephrology (ASN) Task Force recommended calculation based on the Chronic Kidney Disease Epidemiology Collaboration (CKD-EPI) equation refit without adjustment for race.       Narrative:      GFR Normal >60  Chronic Kidney Disease <60  Kidney Failure <15      Phosphorus [[id number]]  (Normal) Collected: 06/08/22 0358    Specimen: Blood Updated: 06/08/22 0516     Phosphorus 3.7 mg/dL     Magnesium [[id number]]  (Normal) Collected: 06/08/22 0358    Specimen: Blood Updated: 06/08/22 0516     Magnesium 2.0 mg/dL     C-reactive Protein [[id number]]  (Abnormal) Collected: 06/08/22 0358    Specimen: Blood Updated: 06/08/22 0516     C-Reactive Protein 2.29 mg/dL     POC Glucose Once [[id number]]  (Abnormal) Collected: 06/08/22 0417    Specimen: Blood Updated: 06/08/22 0418     Glucose 106 mg/dL      Comment: Serial Number: [id number]:  [id number]       POC Glucose Once [[id number]]  (Normal) Collected: 06/08/22 0314    Specimen: Blood Updated: 06/08/22 0315     Glucose 98 mg/dL      Comment: Serial Number: [id number]:  [id number]       POC Glucose Once [[id number]]  (Normal) Collected: 06/08/22 0209    Specimen: Blood Updated: 06/08/22 0211     Glucose 90 mg/dL      Comment: Serial Number: [id number]:  [id number]       POC Glucose Once [[id number]]  (Normal) Collected: 06/08/22 0103    Specimen: Blood Updated: 06/08/22 0103     Glucose 79 mg/dL      Comment: Serial Number: [id number]:  [id number]       POC Glucose Once [[id number]]  (Normal) Collected: 06/08/22 0039    Specimen: Blood Updated: 06/08/22 0039     Glucose 78 mg/dL      Comment: Serial Number: [id number]:  [id number]       Beta Hydroxybutyrate Quantitative [[id number]]  (Normal) Collected: 06/07/22 1540    Specimen: Blood Updated:  06/08/22 0039     Beta-Hydroxybutyrate Quant 0.093 mmol/L     Narrative:      In the assessment of possible diabetic ketoacidosis, the test should be interpreted along with other clinical and laboratory findings.  A level greater than 1 mmol/L should require further evaluation and levels of more than 3 mmol/L require immediate medical review.    POC Glucose Once [[id number]]  (Abnormal) Collected: 06/07/22 2331    Specimen: Blood Updated: 06/07/22 2333     Glucose 110 mg/dL      Comment: Serial Number: [id number]:  [id number]       POC Glucose Once [[id number]]  (Abnormal) Collected: 06/07/22 2227    Specimen: Blood Updated: 06/07/22 2228     Glucose 141 mg/dL      Comment: Serial Number: [id number]:  [id number]       POC Glucose Once [[id number]]  (Abnormal) Collected: 06/07/22 2120    Specimen: Blood Updated: 06/07/22 2122     Glucose 224 mg/dL      Comment: Serial Number: [id number]:  [id number]             IMAGING REVIEWED:  XR Chest 1 View    Result Date: 6/8/2022  Increasing interstitial and alveolar disease in both lungs compared to the chest x-ray of 6/1/2022, suggesting worsening pneumonia or edema.  Electronically Signed By-Heather Kelsey MD On:6/8/2022 9:28 AM This report was finalized on [id number] by  Heather Kelsey MD.      Assessment & Plan     1.  Chronic myelogenous leukemia on hydroxyurea.  The white blood cell count, while still markedly elevated, has come down significantly since the institution of the hydroxyurea.  It has had some impact on the hemoglobin and we will continue to monitor.  Renal function was reviewed and continues to be adequate.  Continue to monitor her CBC closely.  Continue hydroxyurea at current doses  2.  Reviewed the images and the report of this recent scans.  While pulmonary embolism was not documented, there is extensive interstitial infiltrate involving both lungs.  The picture is quite concerning, particularly because her oxygen saturation has been  dropping.  Status post bronchoscopy on 6/7/2022.  Await culture results.  On p.o. antibiotics per pulmonary  3.Daily CBCs.  Her hemoglobin today is 8 g per DL  4.  We will continue to follow    Discussed with patient  Electronically signed by Meghann Lerma MD, 06/08/22, 6:08 PM EDT.

## 2022-06-08 NOTE — PROGRESS NOTES
Daily Progress Note        CML (chronic myelocytic leukemia) (HCC)    Depression with anxiety    Severe anemia    Medically noncompliant    Tachycardia    CML (chronic myeloid leukemia) with failed remission (HCC)    Moderate malnutrition (HCC)    Atypical pneumonia      Assessment    Groundglass opacities with severe cough     CML (chronic myelocytic leukemia) (HCC)  Severe extensive bilateral groundglass opacities: Differential diagnosis Transfusion-related acute lung injury (TRALI), transfusion-associated circulatory overload (TACO), atypical pneumonia, ARDS and/or pulmonary edema: CT scan 6/4/2022 negative for PE    6/6/2022 bronchoscopy, respiratory panel negative  No evidence of alveolar hemorrhage  No significant secretions noted endobronchially  Bilateral lung washing was done with return of cloudy fluid    History of COVID-19 in January 2022    CML with severe leukocytosis    Severe anemia  Diabetes mellitus type 2 with hyperglycemia  Mild hyponatremia  History of depression and anxiety    2D echo 6/5/2022  EF 58%  RVSP 46.6 mmHg        Recommendations:    Antibiotic Rocephin, Azithromycin p.o. x7 days  Will order short course of prednisone for 6 days    Chest x-ray today         Monitor BAL    Oxygen supplement and titration maintain saturation 90 to 95%: Currently on room air    Diamox 250 mg daily,  Lasix 40 mg daily  Blood pressure control  Glucose control, currently on insulin drip                 LOS: 5 days     Subjective         Objective     Vital signs for last 24 hours:  Vitals:    06/07/22 1912 06/07/22 2231 06/08/22 0107 06/08/22 0449   BP: 132/74 119/59 126/65 122/70   BP Location: Right arm Right arm Right arm Right arm   Patient Position: Lying Lying Lying Lying   Pulse: 89 89 86 87   Resp: 18 20 18 16   Temp: 98.4 °F (36.9 °C) 98.1 °F (36.7 °C) 97.7 °F (36.5 °C) 98 °F (36.7 °C)   TempSrc: Oral Oral Oral Oral   SpO2: 97% 98% 95% 95%   Weight:       Height:           Intake/Output last 3  shifts:  I/O last 3 completed shifts:  In: 1836 [P.O.:960; I.V.:476; Blood:300; IV Piggyback:100]  Out: 250 [Urine:250]  Intake/Output this shift:  No intake/output data recorded.      Radiology  Imaging Results (Last 24 Hours)     ** No results found for the last 24 hours. **          Labs:  Results from last 7 days   Lab Units 06/08/22 0358   WBC 10*3/mm3 82.50*   HEMOGLOBIN g/dL 8.0*   HEMATOCRIT % 26.2*   PLATELETS 10*3/mm3 309     Results from last 7 days   Lab Units 06/08/22  0358 06/07/22  1540 06/07/22 0328   SODIUM mmol/L 138   < > 135*   POTASSIUM mmol/L 3.8   < > 3.8   CHLORIDE mmol/L 106   < > 101   CO2 mmol/L 22.0   < > 24.0   BUN mg/dL 14   < > 11   CREATININE mg/dL 0.39*   < > 0.49*   CALCIUM mg/dL 8.0*   < > 7.8*   BILIRUBIN mg/dL  --   --  0.5   ALK PHOS U/L  --   --  908*   ALT (SGPT) U/L  --   --  <5   AST (SGOT) U/L  --   --  5   GLUCOSE mg/dL 99   < > 244*    < > = values in this interval not displayed.         Results from last 7 days   Lab Units 06/07/22 0328 06/01/22 2037   ALBUMIN g/dL 3.10* 3.20*             Results from last 7 days   Lab Units 06/08/22 0358   MAGNESIUM mg/dL 2.0     Results from last 7 days   Lab Units 06/01/22 2037   INR  1.27*               Meds:   SCHEDULE  acetaZOLAMIDE, 250 mg, Oral, Daily  azithromycin, 250 mg, Oral, Q24H  cefTRIAXone, 2 g, Intravenous, Q24H  citalopram, 20 mg, Oral, Daily  furosemide, 40 mg, Intravenous, Daily  gabapentin, 300 mg, Oral, TID  hydroxyurea, 1,000 mg, Oral, BID  metoprolol succinate XL, 25 mg, Oral, Daily  predniSONE, 30 mg, Oral, Daily   Followed by  [START ON 6/9/2022] predniSONE, 20 mg, Oral, Daily   Followed by  [START ON 6/11/2022] predniSONE, 10 mg, Oral, Daily  sodium chloride, 10 mL, Intravenous, Q12H  sodium chloride, 10 mL, Intravenous, Q12H  traZODone, 50 mg, Oral, Nightly      Infusions  dextrose 5 % and lactated Ringer's, 100 mL/hr  insulin, 0-100 Units/hr, Last Rate: 0.3 Units/hr (06/08/22 0626)      PRNs  •   acetaminophen **OR** acetaminophen **OR** acetaminophen  •  ALPRAZolam  •  aluminum-magnesium hydroxide-simethicone  •  dextrose  •  dextrose  •  glucagon (human recombinant)  •  magnesium sulfate **OR** magnesium sulfate **OR** magnesium sulfate  •  melatonin  •  nitroglycerin  •  ondansetron **OR** ondansetron  •  oxyCODONE  •  [COMPLETED] Insert peripheral IV **AND** sodium chloride  •  sodium chloride  •  sodium chloride    Physical Exam:  Physical Exam  Vitals reviewed.   Pulmonary:      Breath sounds: Rhonchi present.   Skin:     General: Skin is warm and dry.   Neurological:      Mental Status: She is alert and oriented to person, place, and time.         ROS  Review of Systems   Respiratory: Positive for cough and shortness of breath.        I have reviewed the patient's new clinical results.    Electronically signed by JP Paige.

## 2022-06-09 LAB
ANION GAP SERPL CALCULATED.3IONS-SCNC: 11 MMOL/L (ref 5–15)
ANISOCYTOSIS BLD QL: ABNORMAL
BASOPHILS # BLD MANUAL: 9.07 10*3/MM3 (ref 0–0.2)
BASOPHILS NFR BLD MANUAL: 14 % (ref 0–1.5)
BH BB BLOOD EXPIRATION DATE: NORMAL
BH BB BLOOD EXPIRATION DATE: NORMAL
BH BB BLOOD TYPE BARCODE: 5100
BH BB BLOOD TYPE BARCODE: 5100
BH BB DISPENSE STATUS: NORMAL
BH BB DISPENSE STATUS: NORMAL
BH BB PRODUCT CODE: NORMAL
BH BB PRODUCT CODE: NORMAL
BH BB UNIT NUMBER: NORMAL
BH BB UNIT NUMBER: NORMAL
BLASTS NFR BLD MANUAL: 22 % (ref 0–0)
BUN SERPL-MCNC: 18 MG/DL (ref 6–20)
BUN/CREAT SERPL: 34.6 (ref 7–25)
CALCIUM SPEC-SCNC: 8.2 MG/DL (ref 8.6–10.5)
CHLORIDE SERPL-SCNC: 105 MMOL/L (ref 98–107)
CO2 SERPL-SCNC: 22 MMOL/L (ref 22–29)
CREAT SERPL-MCNC: 0.52 MG/DL (ref 0.57–1)
CROSSMATCH INTERPRETATION: NORMAL
CROSSMATCH INTERPRETATION: NORMAL
DEPRECATED RDW RBC AUTO: 50.8 FL (ref 37–54)
EGFRCR SERPLBLD CKD-EPI 2021: 116.2 ML/MIN/1.73
EOSINOPHIL # BLD MANUAL: 3.24 10*3/MM3 (ref 0–0.4)
EOSINOPHIL NFR BLD MANUAL: 5 % (ref 0.3–6.2)
ERYTHROCYTE [DISTWIDTH] IN BLOOD BY AUTOMATED COUNT: 18.8 % (ref 12.3–15.4)
GLUCOSE BLDC GLUCOMTR-MCNC: 135 MG/DL (ref 70–105)
GLUCOSE BLDC GLUCOMTR-MCNC: 137 MG/DL (ref 70–105)
GLUCOSE BLDC GLUCOMTR-MCNC: 355 MG/DL (ref 70–105)
GLUCOSE SERPL-MCNC: 172 MG/DL (ref 65–99)
HCT VFR BLD AUTO: 26.8 % (ref 34–46.6)
HGB BLD-MCNC: 8.5 G/DL (ref 12–15.9)
LYMPHOCYTES # BLD MANUAL: 1.94 10*3/MM3 (ref 0.7–3.1)
LYMPHOCYTES NFR BLD MANUAL: 2 % (ref 5–12)
MAGNESIUM SERPL-MCNC: 1.9 MG/DL (ref 1.6–2.6)
MCH RBC QN AUTO: 24.7 PG (ref 26.6–33)
MCHC RBC AUTO-ENTMCNC: 31.8 G/DL (ref 31.5–35.7)
MCV RBC AUTO: 77.6 FL (ref 79–97)
METAMYELOCYTES NFR BLD MANUAL: 1 % (ref 0–0)
MICROCYTES BLD QL: ABNORMAL
MONOCYTES # BLD: 1.3 10*3/MM3 (ref 0.1–0.9)
MYELOCYTES NFR BLD MANUAL: 1 % (ref 0–0)
NEUTROPHILS # BLD AUTO: 32.4 10*3/MM3 (ref 1.7–7)
NEUTROPHILS NFR BLD MANUAL: 43 % (ref 42.7–76)
NEUTS BAND NFR BLD MANUAL: 7 % (ref 0–5)
NRBC SPEC MANUAL: 2 /100 WBC (ref 0–0.2)
PHOSPHATE SERPL-MCNC: 4 MG/DL (ref 2.5–4.5)
PLAT MORPH BLD: NORMAL
PLATELET # BLD AUTO: 326 10*3/MM3 (ref 140–450)
PMV BLD AUTO: 7.7 FL (ref 6–12)
POIKILOCYTOSIS BLD QL SMEAR: ABNORMAL
POTASSIUM SERPL-SCNC: 3.9 MMOL/L (ref 3.5–5.2)
PROMYELOCYTES NFR BLD MANUAL: 2 % (ref 0–0)
RBC # BLD AUTO: 3.45 10*6/MM3 (ref 3.77–5.28)
REF LAB TEST METHOD: NORMAL
SCAN SLIDE: NORMAL
SODIUM SERPL-SCNC: 138 MMOL/L (ref 136–145)
UNIT  ABO: NORMAL
UNIT  ABO: NORMAL
UNIT  RH: NORMAL
UNIT  RH: NORMAL
VARIANT LYMPHS NFR BLD MANUAL: 3 % (ref 19.6–45.3)
WBC MORPH BLD: NORMAL
WBC NRBC COR # BLD: 64.8 10*3/MM3 (ref 3.4–10.8)

## 2022-06-09 PROCEDURE — 82962 GLUCOSE BLOOD TEST: CPT

## 2022-06-09 PROCEDURE — 63710000001 INSULIN LISPRO (HUMAN) PER 5 UNITS: Performed by: INTERNAL MEDICINE

## 2022-06-09 PROCEDURE — 84100 ASSAY OF PHOSPHORUS: CPT | Performed by: INTERNAL MEDICINE

## 2022-06-09 PROCEDURE — 63710000001 INSULIN GLARGINE PER 5 UNITS: Performed by: INTERNAL MEDICINE

## 2022-06-09 PROCEDURE — 99232 SBSQ HOSP IP/OBS MODERATE 35: CPT | Performed by: INTERNAL MEDICINE

## 2022-06-09 PROCEDURE — 63710000001 PREDNISONE PER 1 MG: Performed by: INTERNAL MEDICINE

## 2022-06-09 PROCEDURE — 99231 SBSQ HOSP IP/OBS SF/LOW 25: CPT | Performed by: INTERNAL MEDICINE

## 2022-06-09 PROCEDURE — 83735 ASSAY OF MAGNESIUM: CPT | Performed by: INTERNAL MEDICINE

## 2022-06-09 PROCEDURE — 80048 BASIC METABOLIC PNL TOTAL CA: CPT | Performed by: INTERNAL MEDICINE

## 2022-06-09 PROCEDURE — 85007 BL SMEAR W/DIFF WBC COUNT: CPT | Performed by: INTERNAL MEDICINE

## 2022-06-09 PROCEDURE — 25010000002 FUROSEMIDE PER 20 MG: Performed by: INTERNAL MEDICINE

## 2022-06-09 PROCEDURE — 25010000002 CEFTRIAXONE PER 250 MG: Performed by: INTERNAL MEDICINE

## 2022-06-09 PROCEDURE — 85025 COMPLETE CBC W/AUTO DIFF WBC: CPT | Performed by: INTERNAL MEDICINE

## 2022-06-09 RX ORDER — OXYCODONE HYDROCHLORIDE 5 MG/1
10 TABLET ORAL EVERY 4 HOURS PRN
Status: DISCONTINUED | OUTPATIENT
Start: 2022-06-09 | End: 2022-06-10 | Stop reason: HOSPADM

## 2022-06-09 RX ADMIN — OXYCODONE 10 MG: 5 TABLET ORAL at 21:11

## 2022-06-09 RX ADMIN — HYDROXYUREA 1000 MG: 500 CAPSULE ORAL at 21:11

## 2022-06-09 RX ADMIN — CITALOPRAM HYDROBROMIDE 20 MG: 20 TABLET ORAL at 08:52

## 2022-06-09 RX ADMIN — GABAPENTIN 300 MG: 300 CAPSULE ORAL at 16:51

## 2022-06-09 RX ADMIN — Medication 10 ML: at 21:11

## 2022-06-09 RX ADMIN — INSULIN LISPRO 6 UNITS: 100 INJECTION, SOLUTION INTRAVENOUS; SUBCUTANEOUS at 17:04

## 2022-06-09 RX ADMIN — OXYCODONE 10 MG: 5 TABLET ORAL at 06:47

## 2022-06-09 RX ADMIN — ACETAZOLAMIDE 250 MG: 250 TABLET ORAL at 08:50

## 2022-06-09 RX ADMIN — Medication 10 ML: at 08:51

## 2022-06-09 RX ADMIN — TRAZODONE HYDROCHLORIDE 50 MG: 50 TABLET ORAL at 21:11

## 2022-06-09 RX ADMIN — METOPROLOL SUCCINATE 25 MG: 25 TABLET, FILM COATED, EXTENDED RELEASE ORAL at 08:50

## 2022-06-09 RX ADMIN — GABAPENTIN 300 MG: 300 CAPSULE ORAL at 08:50

## 2022-06-09 RX ADMIN — AZITHROMYCIN MONOHYDRATE 250 MG: 250 TABLET ORAL at 08:50

## 2022-06-09 RX ADMIN — Medication 10 ML: at 08:52

## 2022-06-09 RX ADMIN — INSULIN LISPRO 20 UNITS: 100 INJECTION, SOLUTION INTRAVENOUS; SUBCUTANEOUS at 17:04

## 2022-06-09 RX ADMIN — CEFTRIAXONE 2 G: 2 INJECTION, POWDER, FOR SOLUTION INTRAMUSCULAR; INTRAVENOUS at 14:31

## 2022-06-09 RX ADMIN — INSULIN LISPRO 6 UNITS: 100 INJECTION, SOLUTION INTRAVENOUS; SUBCUTANEOUS at 11:50

## 2022-06-09 RX ADMIN — PREDNISONE 20 MG: 20 TABLET ORAL at 08:50

## 2022-06-09 RX ADMIN — INSULIN GLARGINE 20 UNITS: 100 INJECTION, SOLUTION SUBCUTANEOUS at 06:01

## 2022-06-09 RX ADMIN — GABAPENTIN 300 MG: 300 CAPSULE ORAL at 21:11

## 2022-06-09 RX ADMIN — FUROSEMIDE 40 MG: 10 INJECTION, SOLUTION INTRAMUSCULAR; INTRAVENOUS at 08:50

## 2022-06-09 RX ADMIN — HYDROXYUREA 1000 MG: 500 CAPSULE ORAL at 08:50

## 2022-06-09 RX ADMIN — OXYCODONE 10 MG: 5 TABLET ORAL at 14:31

## 2022-06-09 RX ADMIN — INSULIN LISPRO 6 UNITS: 100 INJECTION, SOLUTION INTRAVENOUS; SUBCUTANEOUS at 08:50

## 2022-06-09 RX ADMIN — Medication 10 ML: at 21:14

## 2022-06-09 NOTE — CASE MANAGEMENT/SOCIAL WORK
Continued Stay Note  [facility]     Patient Name: Nieves Mc  MRN: [id number]  Today's Date: 6/9/2022    Admit Date: 6/1/2022     Discharge Plan     Row Name 06/09/22 1139       Plan    Plan D/C Plan: Anticipate routine home with family.    Plan Comments Barrier to D/C: IV abx, IV lasix, heme/onc and pulmonology following.                    Expected Discharge Date and Time     Expected Discharge Date Expected Discharge Time    Anibal 10, 2022         Phone communication or documentation only - no physical contact with patient or family.    DELORES PackN, RN    [place] [id number]    Office: [phone]  Fax: [phone]

## 2022-06-09 NOTE — PROGRESS NOTES
Hematology/Oncology Inpatient Progress Note    PATIENT NAME: Nieves Mc  : 1975  MRN: [id number]    Chief complaint: Uncontrolled Leukemia     History of present illness:      Nieves Mc is a 46 y.o. female who presented to [facility] on 2022 with complaints of cough for over a month, and SOB.  Patient also has a history of progressive fatigue but denies bleeding.  There is history of contact with someone with cough.  She denies fevers, chills, nausea or vomiting.  She complains of left upper abdominal discomfort at the site of her splenic enlargement.  She was sent to the ER due to tachycardia, significant anemia possible pneumonia.     22  Hematology/Oncology was consulted as she has a history of CML on to Tasigna twice a day.  Patient has been very noncompliant with her treatment.  She was seen in the office with a white count of more than 200,000, significant anemia with hemoglobin of 7.7 and a history of prolonged cough for the past 1 and half months.  Patient has not been seen in the office since end of 2022.  She has a history of noncompliance with her medications.         She  has a past medical history of Bone pain, Extremity pain, Leg pain, Migraine, Pulmonary embolism (HCC), and Vision loss.     PCP: Houston Oro MD    Subjective      She denies any new issues today        MEDICATIONS:      Scheduled Meds:  acetaZOLAMIDE, 250 mg, Oral, Daily  azithromycin, 250 mg, Oral, Q24H  citalopram, 20 mg, Oral, Daily  furosemide, 40 mg, Intravenous, Daily  gabapentin, 300 mg, Oral, TID  hydroxyurea, 1,000 mg, Oral, BID  insulin glargine, 20 Units, Subcutaneous, QAM  insulin lispro, 0-24 Units, Subcutaneous, TID AC  insulin lispro, 6 Units, Subcutaneous, TID With Meals  metoprolol succinate XL, 25 mg, Oral, Daily  predniSONE, 20 mg, Oral, Daily   Followed by  [START ON 2022] predniSONE, 10 mg, Oral, Daily  sodium chloride, 10 mL, Intravenous, Q12H  sodium  "chloride, 10 mL, Intravenous, Q12H  traZODone, 50 mg, Oral, Nightly       Continuous Infusions:      PRN Meds:  •  acetaminophen **OR** acetaminophen **OR** acetaminophen  •  aluminum-magnesium hydroxide-simethicone  •  dextrose  •  dextrose  •  glucagon (human recombinant)  •  insulin lispro **AND** insulin lispro  •  magnesium sulfate **OR** magnesium sulfate **OR** magnesium sulfate  •  melatonin  •  nitroglycerin  •  ondansetron **OR** ondansetron  •  oxyCODONE  •  [COMPLETED] Insert peripheral IV **AND** sodium chloride  •  sodium chloride  •  sodium chloride     ALLERGIES:    Allergies   Allergen Reactions   • Hydromorphone GI Intolerance     dilaudid   • Morphine Nausea And Vomiting   • Propofol Hives     A 12 point review of systems was conducted.  All pertinent positives and negatives are documented above.  Objective    VITALS:   /75   Pulse 97   Temp 96 °F (35.6 °C) (Oral)   Resp 20   Ht 162.6 cm (64\")   Wt 58.5 kg (128 lb 15.5 oz)   SpO2 99%   BMI 22.14 kg/m²     PHYSICAL EXAM: (performed by MD)  Physical Exam  Constitutional:       General: She is in acute distress.      Appearance: She is ill-appearing. She is not toxic-appearing or diaphoretic.   HENT:      Head: Normocephalic and atraumatic.      Right Ear: External ear normal.      Left Ear: External ear normal.      Nose: Nose normal. No congestion or rhinorrhea.      Mouth/Throat:      Mouth: Mucous membranes are moist.      Pharynx: Oropharynx is clear. No oropharyngeal exudate or posterior oropharyngeal erythema.      Comments: Very poor dentition  Eyes:      General:         Right eye: No discharge.         Left eye: No discharge.      Pupils: Pupils are equal, round, and reactive to light.   Cardiovascular:      Rate and Rhythm: Regular rhythm. Tachycardia present.      Pulses: Normal pulses.      Heart sounds: Normal heart sounds. No murmur heard.    No friction rub. No gallop.   Pulmonary:      Effort: No respiratory distress.    "   Breath sounds: No stridor. Wheezing and rhonchi present.      Comments: Labored.  The lungs are markedly diminished and there are bilateral fine crackles and wheezes throughout both sides.  Abdominal:      General: There is no distension.      Palpations: Abdomen is soft. There is no mass.      Tenderness: There is no abdominal tenderness.      Comments: Rounded, protuberant.  Soft and nontender.   Musculoskeletal:         General: Normal range of motion.      Cervical back: Normal range of motion and neck supple. No rigidity or tenderness.      Right lower leg: No edema.      Left lower leg: No edema.   Lymphadenopathy:      Cervical: No cervical adenopathy.   Skin:     General: Skin is warm.      Coloration: Skin is pale. Skin is not jaundiced.      Findings: No bruising or erythema.   Neurological:      General: No focal deficit present.      Mental Status: She is alert and oriented to person, place, and time.      Cranial Nerves: No cranial nerve deficit.   Psychiatric:         Mood and Affect: Mood normal.         Behavior: Behavior normal.         Thought Content: Thought content normal.         Judgment: Judgment normal.     I have reexamined the patient and the results are consistent with the previously documented exam. Meghann Lerma MD     RECENT LABS:    Lab Results (last 24 hours)     Procedure Component Value Units Date/Time    POC Glucose Once [[id number]]  (Abnormal) Collected: 06/09/22 1654    Specimen: Blood Updated: 06/09/22 1655     Glucose 355 mg/dL      Comment: Serial Number: [id number]:  [id number]       Blood Culture - Blood, Hand, Right [[id number]]  (Normal) Collected: 06/05/22 1348    Specimen: Blood from Hand, Right Updated: 06/09/22 1404     Blood Culture No growth at 4 days    Blood Culture - Blood, Arm, Right [[id number]]  (Normal) Collected: 06/05/22 1348    Specimen: Blood from Arm, Right Updated: 06/09/22 1404     Blood Culture No growth at 4 days    Aspergillus  Galactomannan Antigen - Wash, Lung, R [[id number]] Collected: 06/06/22 1211    Specimen: Wash from Lung, R Updated: 06/09/22 1342     Reference Lab Report See Attached Report    POC Glucose Once [[id number]]  (Abnormal) Collected: 06/09/22 1134    Specimen: Blood Updated: 06/09/22 1135     Glucose 137 mg/dL      Comment: Serial Number: [id number]:  [id number]       POC Glucose Once [[id number]]  (Abnormal) Collected: 06/09/22 0729    Specimen: Blood Updated: 06/09/22 0731     Glucose 135 mg/dL      Comment: Serial Number: [id number]:  [id number]       Manual Differential [[id number]]  (Abnormal) Collected: 06/09/22 0453    Specimen: Blood Updated: 06/09/22 0626     Neutrophil % 43.0 %      Lymphocyte % 3.0 %      Monocyte % 2.0 %      Eosinophil % 5.0 %      Basophil % 14.0 %      Bands %  7.0 %      Metamyelocyte % 1.0 %      Myelocyte % 1.0 %      Promyelocyte % 2.0 %      Blasts % 22.0 %      Neutrophils Absolute 32.40 10*3/mm3      Lymphocytes Absolute 1.94 10*3/mm3      Monocytes Absolute 1.30 10*3/mm3      Eosinophils Absolute 3.24 10*3/mm3      Basophils Absolute 9.07 10*3/mm3      nRBC 2.0 /100 WBC      Anisocytosis Slight/1+     Microcytes Slight/1+     Poikilocytes Slight/1+     WBC Morphology Normal     Platelet Morphology Normal    Narrative:      Reviewed by Pathologist within the past 30 days on [id number] .      CBC & Differential [[id number]]  (Abnormal) Collected: 06/09/22 0453    Specimen: Blood Updated: 06/09/22 0626    Narrative:      The following orders were created for panel order CBC & Differential.  Procedure                               Abnormality         Status                     ---------                               -----------         ------                     CBC Auto Differential[[id number]]        Abnormal            Final result               Scan Slide[[id number]]                                       Final result                 Please view results for these tests on the  individual orders.    Scan Slide [[id number]] Collected: 06/09/22 0453    Specimen: Blood Updated: 06/09/22 0626     Scan Slide --     Comment: See Manual Differential Results       CBC Auto Differential [[id number]]  (Abnormal) Collected: 06/09/22 0453    Specimen: Blood Updated: 06/09/22 0626     WBC 64.80 10*3/mm3      RBC 3.45 10*6/mm3      Hemoglobin 8.5 g/dL      Hematocrit 26.8 %      MCV 77.6 fL      MCH 24.7 pg      MCHC 31.8 g/dL      RDW 18.8 %      RDW-SD 50.8 fl      MPV 7.7 fL      Platelets 326 10*3/mm3     Narrative:      The previously reported component NRBC is no longer being reported. Previous result was 1.3 /100 WBC (Reference Range: 0.0-0.2 /100 WBC) on 6/9/2022 at 0525 EDT.    Basic Metabolic Panel [[id number]]  (Abnormal) Collected: 06/09/22 0453    Specimen: Blood Updated: 06/09/22 0606     Glucose 172 mg/dL      BUN 18 mg/dL      Creatinine 0.52 mg/dL      Sodium 138 mmol/L      Potassium 3.9 mmol/L      Chloride 105 mmol/L      CO2 22.0 mmol/L      Calcium 8.2 mg/dL      BUN/Creatinine Ratio 34.6     Anion Gap 11.0 mmol/L      eGFR 116.2 mL/min/1.73      Comment: National Kidney Foundation and American Society of Nephrology (ASN) Task Force recommended calculation based on the Chronic Kidney Disease Epidemiology Collaboration (CKD-EPI) equation refit without adjustment for race.       Narrative:      GFR Normal >60  Chronic Kidney Disease <60  Kidney Failure <15      Phosphorus [[id number]]  (Normal) Collected: 06/09/22 0453    Specimen: Blood Updated: 06/09/22 0606     Phosphorus 4.0 mg/dL     Magnesium [[id number]]  (Normal) Collected: 06/09/22 0453    Specimen: Blood Updated: 06/09/22 0606     Magnesium 1.9 mg/dL           IMAGING REVIEWED:  XR Chest 1 View    Result Date: 6/8/2022  Increasing interstitial and alveolar disease in both lungs compared to the chest x-ray of 6/1/2022, suggesting worsening pneumonia or edema.  Electronically Signed By-Heather Kelsey MD On:6/8/2022 9:28 AM This  report was finalized on [id number] by  Heather Kelsey MD.      Assessment & Plan     1.  Chronic myelogenous leukemia on hydroxyurea.  The white blood cell count, while still markedly elevated, has come down significantly since the institution of the hydroxyurea.  It has had some impact on the hemoglobin and we will continue to monitor.  Renal function was reviewed and continues to be adequate.  Continue to monitor her CBC closely.  Continue hydroxyurea at current doses  2.  Reviewed the images and the report of this recent scans.  While pulmonary embolism was not documented, there is extensive interstitial infiltrate involving both lungs.  The picture is quite concerning, particularly because her oxygen saturation has been dropping.  Status post bronchoscopy on 6/7/2022.  Await culture results.  On p.o. antibiotics per pulmonary.  Overall her breathing has improved  3.Daily CBCs.  Her hemoglobin today is 8.3 g per DL  4.  We will continue to follow    Discussed with patient    Electronically signed by Meghann Lerma MD, 06/09/22, 6:05 PM EDT.

## 2022-06-09 NOTE — PROGRESS NOTES
Daily Progress Note        CML (chronic myelocytic leukemia) (HCC)    Depression with anxiety    Severe anemia    Medically noncompliant    Tachycardia    CML (chronic myeloid leukemia) with failed remission (HCC)    Moderate malnutrition (HCC)    Atypical pneumonia      Assessment    Groundglass opacities with severe cough     CML (chronic myelocytic leukemia) (HCC)  Severe extensive bilateral groundglass opacities: Differential diagnosis Transfusion-related acute lung injury (TRALI), transfusion-associated circulatory overload (TACO), atypical pneumonia, ARDS and/or pulmonary edema: CT scan 6/4/2022 negative for PE    6/6/2022 bronchoscopy, respiratory panel negative  No evidence of alveolar hemorrhage  No significant secretions noted endobronchially  Bilateral lung washing was done with return of cloudy fluid    History of COVID-19 in January 2022    CML with severe leukocytosis    Severe anemia  Diabetes mellitus type 2 with hyperglycemia  Mild hyponatremia  History of depression and anxiety    2D echo 6/5/2022  EF 58%  RVSP 46.6 mmHg        Recommendations:    Antibiotic finishing 5 days of Rocephin, Azithromycin p.o. x7 days  Patient short course of prednisone for 6 days       Monitor BAL    Oxygen supplement and titration maintain saturation 90 to 95%: Currently on room air    Diamox 250 mg daily,  Lasix 40 mg daily  Blood pressure control  Glucose control, currently on insulin drip                 LOS: 6 days     Subjective         Objective     Vital signs for last 24 hours:  Vitals:    06/08/22 2232 06/09/22 0200 06/09/22 0500 06/09/22 0537   BP: 131/62 122/73  132/81   BP Location: Right arm Right arm  Right arm   Patient Position: Lying Lying  Lying   Pulse:  89  93   Resp: 18 16  16   Temp: 98.2 °F (36.8 °C) 98.3 °F (36.8 °C)     TempSrc: Oral Oral  Oral   SpO2:  95%  95%   Weight:   58.5 kg (128 lb 15.5 oz)    Height:           Intake/Output last 3 shifts:  I/O last 3 completed shifts:  In: 840  [P.O.:840]  Out: -   Intake/Output this shift:  No intake/output data recorded.      Radiology  Imaging Results (Last 24 Hours)     Procedure Component Value Units Date/Time    XR Chest 1 View [[id number]] Collected: 06/08/22 0927     Updated: 06/08/22 0930    Narrative:      DATE OF EXAM:  6/8/2022 8:58 AM     PROCEDURE:  XR CHEST 1 VW-     INDICATIONS:  Dyspnea and cough; C92.10-Chronic myeloid leukemia, BCR/ABL-positive,  not having achieved remission; D64.9-Anemia, unspecified;  R10.84-Generalized abdominal pain; R16.1-Splenomegaly, not elsewhere  classified; E10.649-Type 1 diabetes mellitus with hypoglycemia without  coma; J18.9-Pneumonia, unspecified organism       COMPARISON:  AP portable chest 6/1/2022, CT chest 6/4/2022.     TECHNIQUE:   Single radiographic view of the chest was obtained.     FINDINGS:  Interval increase in interstitial and alveolar disease in both lungs  since the prior chest x-ray. Stable cardiac enlargement. No pleural  effusion or pneumothorax is identified. There are no acute osseous  abnormalities.       Impression:      Increasing interstitial and alveolar disease in both lungs compared to  the chest x-ray of 6/1/2022, suggesting worsening pneumonia or edema.     Electronically Signed By-Heather Kelsey MD On:6/8/2022 9:28 AM  This report was finalized on [id number] by  Heather Kelsey MD.          Labs:  Results from last 7 days   Lab Units 06/09/22  0453   WBC 10*3/mm3 64.80*   HEMOGLOBIN g/dL 8.5*   HEMATOCRIT % 26.8*   PLATELETS 10*3/mm3 326     Results from last 7 days   Lab Units 06/09/22  0453 06/07/22  1540 06/07/22  0328   SODIUM mmol/L 138   < > 135*   POTASSIUM mmol/L 3.9   < > 3.8   CHLORIDE mmol/L 105   < > 101   CO2 mmol/L 22.0   < > 24.0   BUN mg/dL 18   < > 11   CREATININE mg/dL 0.52*   < > 0.49*   CALCIUM mg/dL 8.2*   < > 7.8*   BILIRUBIN mg/dL  --   --  0.5   ALK PHOS U/L  --   --  908*   ALT (SGPT) U/L  --   --  <5   AST (SGOT) U/L  --   --  5   GLUCOSE mg/dL 172*    < > 244*    < > = values in this interval not displayed.         Results from last 7 days   Lab Units 06/07/22  0328   ALBUMIN g/dL 3.10*             Results from last 7 days   Lab Units 06/09/22  0453   MAGNESIUM mg/dL 1.9                   Meds:   SCHEDULE  acetaZOLAMIDE, 250 mg, Oral, Daily  azithromycin, 250 mg, Oral, Q24H  cefTRIAXone, 2 g, Intravenous, Q24H  citalopram, 20 mg, Oral, Daily  furosemide, 40 mg, Intravenous, Daily  gabapentin, 300 mg, Oral, TID  hydroxyurea, 1,000 mg, Oral, BID  insulin glargine, 20 Units, Subcutaneous, QAM  insulin lispro, 0-24 Units, Subcutaneous, TID AC  insulin lispro, 6 Units, Subcutaneous, TID With Meals  metoprolol succinate XL, 25 mg, Oral, Daily  predniSONE, 20 mg, Oral, Daily   Followed by  [START ON 6/11/2022] predniSONE, 10 mg, Oral, Daily  sodium chloride, 10 mL, Intravenous, Q12H  sodium chloride, 10 mL, Intravenous, Q12H  traZODone, 50 mg, Oral, Nightly      Infusions     PRNs  •  acetaminophen **OR** acetaminophen **OR** acetaminophen  •  aluminum-magnesium hydroxide-simethicone  •  dextrose  •  dextrose  •  glucagon (human recombinant)  •  insulin lispro **AND** insulin lispro  •  magnesium sulfate **OR** magnesium sulfate **OR** magnesium sulfate  •  melatonin  •  nitroglycerin  •  ondansetron **OR** ondansetron  •  oxyCODONE  •  [COMPLETED] Insert peripheral IV **AND** sodium chloride  •  sodium chloride  •  sodium chloride    Physical Exam:  Physical Exam  Vitals reviewed.   Pulmonary:      Breath sounds: Rhonchi present.   Skin:     General: Skin is warm and dry.   Neurological:      Mental Status: She is alert and oriented to person, place, and time.         ROS  Review of Systems   Respiratory: Positive for cough and shortness of breath.        I have reviewed the patient's new clinical results.    Electronically signed by JP Paige.

## 2022-06-09 NOTE — PROGRESS NOTES
[facility] Daily Progress Note    Patient Name: Nieves Mc  : 1975  MRN: [id number]  Primary Care Physician:  Houston Oro MD  Date of admission: 2022      Subjective      Chief Complaint: Shortness of breath     Patient Reports she feels okay.  Glucose much better today.  Heart rate down under 100 finally.  Remains on room air.  Finishing up antibiotics.      ROS negative except as above    Objective      Vitals:   Temp:  [98.2 °F (36.8 °C)-98.3 °F (36.8 °C)] 98.3 °F (36.8 °C)  Heart Rate:  [89-96] 96  Resp:  [16-20] 18  BP: (122-132)/(62-81) 129/76      Vitals reviewed.   Constitutional:       Comments: On room air today    HENT:      Head: Normocephalic.      Nose: Nose normal.   Cardiovascular:      Rate and Rhythm: Regular rhythm.  Tachycardia resolved     Pulses: Normal pulses.   Pulmonary:      Effort: Pulmonary effort is normal.   Abdominal:      General: There is ascites.      Comments: Significant splenomegaly   Musculoskeletal:         General: Normal range of motion.      Cervical back: Normal range of motion.   Skin:     General: Skin is warm.      Capillary Refill: Capillary refill takes less than 2 seconds.   Neurological:      General: No focal deficit present.   Psychiatric:         Mood and Affect: Mood normal.        Result Review    Result Review:  I have personally reviewed the results from the time of this admission to 2022 16:49 EDT and agree with these findings:  [x]  Laboratory  []  Microbiology  []  Radiology  []  EKG/Telemetry   []  Cardiology/Vascular   []  Pathology  []  Old records  []  Other:  Most notable findings include: White count improving              Assessment & Plan    46-year-old lady on chemotherapy presents with sepsis pneumonia shortness of breath tachycardia anemia     Active Hospital Problems:          Active Hospital Problems     Diagnosis     • **CML (chronic myelocytic leukemia) (HCC)     •  Tachycardia     • Severe anemia     • Medically noncompliant     • Depression with anxiety        Plan:       CML with associated chronic pain  -WBCs improving  -Per oncology note, currently in chronic phase based on her bone marrow  -She admits noncompliance with treatment (tasigna 300 mg p.o. twice daily) since February 2022.  -Continue home Percocet  -Oncology consulted appreciate assistance      Severe anemia  -Hemoglobin stable today continue to monitor  -CT of the abdomen and pelvis shows no sign of GI bleed  -Monitor H&H  -Type and cross  -1 unit PRBC ordered for transfusion in ED  -Hold home Xarelto for now  -Another unit given June 2  Hemoglobin much better on June 8.  No longer requiring transfusions     Diabetes mellitus type 2  -Hemoglobin A1c ordered  -Patient also reports noncompliance with insulin  -Blood glucose 533 on arrival to ED decreased to 477 after receiving 8 units of regular insulin  -Accu-Cheks AC at bedtime  -Continue home NPH  -Continue home gabapentin  -SSI ordered  -Started Lantus 10 units daily  -Severe steroid-induced hyperglycemia.    Patient was in the 600s.  Moved to PCU for insulin drip.  Now much better.  In the low 100s.  Resume insulin and sliding scale  Switch to high-dose sliding scale.  20 units of Lantus ordered.     Depression with anxiety  -Chronic, stable  -Continue home alprazolam, Celexa, trazodone     Tachycardia  -Resolved on June 8     Persistent nausea vomiting  Improved underneath      CT PE shows bilateral opacities pneumonia with possible ARDS versus opportunistic infection.  Echo pending.  Appreciate pulmonary assistance.  Possible transfusion reaction on admission as her symptoms got worse posttransfusion.    Steroids initiated     Status post bronchoscopy on June 6 follow-up results     Perhaps can go home tomorrow if stable and cleared by pulmonary/oncology    DVT prophylaxis:  Mechanical DVT prophylaxis orders are present.     CODE STATUS:    Code Status  (Patient has no pulse and is not breathing): CPR (Attempt to Resuscitate)  Medical Interventions (Patient has pulse or is breathing): Full Support     Admission Status:  I believe this patient meets inpatient status.     I discussed the patient's findings and my recommendations with patient.     This patient has been examined wearing appropriate Personal Protective Equipment   06/09/22      Electronically signed by Villa Patel MD, 06/09/22, 16:49 EDT.  [facility]

## 2022-06-09 NOTE — PLAN OF CARE
Goal Outcome Evaluation:  Pt currently on RA and sating in uper 90s. Pt c/o generalized pain in BLE r/t CML - managed w/PRN norco. Insulin gtt DCd - sugars still in upper 300s but not elevated enough for intervention. Vitals stable. Will continue to monitor.

## 2022-06-10 ENCOUNTER — READMISSION MANAGEMENT (OUTPATIENT)
Dept: CALL CENTER | Facility: HOSPITAL | Age: 47
End: 2022-06-10

## 2022-06-10 VITALS
HEIGHT: 64 IN | TEMPERATURE: 98.2 F | HEART RATE: 92 BPM | OXYGEN SATURATION: 94 % | SYSTOLIC BLOOD PRESSURE: 109 MMHG | BODY MASS INDEX: 21.11 KG/M2 | DIASTOLIC BLOOD PRESSURE: 60 MMHG | WEIGHT: 123.68 LBS | RESPIRATION RATE: 20 BRPM

## 2022-06-10 LAB
ALBUMIN SERPL-MCNC: 3.2 G/DL (ref 3.5–5.2)
ALBUMIN/GLOB SERPL: 1.1 G/DL
ALP SERPL-CCNC: 742 U/L (ref 39–117)
ALT SERPL W P-5'-P-CCNC: 6 U/L (ref 1–33)
ANION GAP SERPL CALCULATED.3IONS-SCNC: 12 MMOL/L (ref 5–15)
ANISOCYTOSIS BLD QL: ABNORMAL
AST SERPL-CCNC: 9 U/L (ref 1–32)
BACTERIA SPEC AEROBE CULT: NORMAL
BACTERIA SPEC AEROBE CULT: NORMAL
BASOPHILS # BLD MANUAL: 8.04 10*3/MM3 (ref 0–0.2)
BASOPHILS NFR BLD MANUAL: 14 % (ref 0–1.5)
BILIRUB SERPL-MCNC: 0.4 MG/DL (ref 0–1.2)
BLASTS NFR BLD MANUAL: 12 % (ref 0–0)
BUN SERPL-MCNC: 19 MG/DL (ref 6–20)
BUN/CREAT SERPL: 39.6 (ref 7–25)
CALCIUM SPEC-SCNC: 8 MG/DL (ref 8.6–10.5)
CHLORIDE SERPL-SCNC: 103 MMOL/L (ref 98–107)
CO2 SERPL-SCNC: 24 MMOL/L (ref 22–29)
CREAT SERPL-MCNC: 0.48 MG/DL (ref 0.57–1)
CRP SERPL-MCNC: 1 MG/DL (ref 0–0.5)
DEPRECATED RDW RBC AUTO: 50.8 FL (ref 37–54)
EGFRCR SERPLBLD CKD-EPI 2021: 118.5 ML/MIN/1.73
EOSINOPHIL # BLD MANUAL: 4.02 10*3/MM3 (ref 0–0.4)
EOSINOPHIL NFR BLD MANUAL: 7 % (ref 0.3–6.2)
ERYTHROCYTE [DISTWIDTH] IN BLOOD BY AUTOMATED COUNT: 19.1 % (ref 12.3–15.4)
GLOBULIN UR ELPH-MCNC: 3 GM/DL
GLUCOSE BLDC GLUCOMTR-MCNC: 162 MG/DL (ref 70–105)
GLUCOSE BLDC GLUCOMTR-MCNC: 220 MG/DL (ref 70–105)
GLUCOSE BLDC GLUCOMTR-MCNC: 399 MG/DL (ref 70–105)
GLUCOSE SERPL-MCNC: 167 MG/DL (ref 65–99)
HCT VFR BLD AUTO: 28.1 % (ref 34–46.6)
HGB BLD-MCNC: 8.8 G/DL (ref 12–15.9)
LARGE PLATELETS: ABNORMAL
LYMPHOCYTES # BLD MANUAL: 3.44 10*3/MM3 (ref 0.7–3.1)
LYMPHOCYTES NFR BLD MANUAL: 4 % (ref 5–12)
MAGNESIUM SERPL-MCNC: 1.9 MG/DL (ref 1.6–2.6)
MCH RBC QN AUTO: 24.2 PG (ref 26.6–33)
MCHC RBC AUTO-ENTMCNC: 31.4 G/DL (ref 31.5–35.7)
MCV RBC AUTO: 77.2 FL (ref 79–97)
MONOCYTES # BLD: 2.3 10*3/MM3 (ref 0.1–0.9)
NEUTROPHILS # BLD AUTO: 32.72 10*3/MM3 (ref 1.7–7)
NEUTROPHILS NFR BLD MANUAL: 48 % (ref 42.7–76)
NEUTS BAND NFR BLD MANUAL: 9 % (ref 0–5)
NRBC SPEC MANUAL: 3 /100 WBC (ref 0–0.2)
PHOSPHATE SERPL-MCNC: 4.3 MG/DL (ref 2.5–4.5)
PLATELET # BLD AUTO: 334 10*3/MM3 (ref 140–450)
PMV BLD AUTO: 7.8 FL (ref 6–12)
POLYCHROMASIA BLD QL SMEAR: ABNORMAL
POTASSIUM SERPL-SCNC: 3.8 MMOL/L (ref 3.5–5.2)
PROT SERPL-MCNC: 6.2 G/DL (ref 6–8.5)
RBC # BLD AUTO: 3.64 10*6/MM3 (ref 3.77–5.28)
SCAN SLIDE: NORMAL
SODIUM SERPL-SCNC: 139 MMOL/L (ref 136–145)
VARIANT LYMPHS NFR BLD MANUAL: 6 % (ref 19.6–45.3)
WBC MORPH BLD: NORMAL
WBC NRBC COR # BLD: 57.4 10*3/MM3 (ref 3.4–10.8)

## 2022-06-10 PROCEDURE — 63710000001 PREDNISONE PER 1 MG: Performed by: INTERNAL MEDICINE

## 2022-06-10 PROCEDURE — 63710000001 INSULIN GLARGINE PER 5 UNITS: Performed by: INTERNAL MEDICINE

## 2022-06-10 PROCEDURE — 82962 GLUCOSE BLOOD TEST: CPT

## 2022-06-10 PROCEDURE — 85025 COMPLETE CBC W/AUTO DIFF WBC: CPT | Performed by: INTERNAL MEDICINE

## 2022-06-10 PROCEDURE — 99232 SBSQ HOSP IP/OBS MODERATE 35: CPT | Performed by: INTERNAL MEDICINE

## 2022-06-10 PROCEDURE — 85007 BL SMEAR W/DIFF WBC COUNT: CPT | Performed by: INTERNAL MEDICINE

## 2022-06-10 PROCEDURE — 99239 HOSP IP/OBS DSCHRG MGMT >30: CPT | Performed by: INTERNAL MEDICINE

## 2022-06-10 PROCEDURE — 25010000002 FUROSEMIDE PER 20 MG: Performed by: INTERNAL MEDICINE

## 2022-06-10 PROCEDURE — 80053 COMPREHEN METABOLIC PANEL: CPT | Performed by: INTERNAL MEDICINE

## 2022-06-10 PROCEDURE — 63710000001 INSULIN LISPRO (HUMAN) PER 5 UNITS: Performed by: INTERNAL MEDICINE

## 2022-06-10 PROCEDURE — 83735 ASSAY OF MAGNESIUM: CPT | Performed by: INTERNAL MEDICINE

## 2022-06-10 PROCEDURE — 84100 ASSAY OF PHOSPHORUS: CPT | Performed by: INTERNAL MEDICINE

## 2022-06-10 PROCEDURE — 94618 PULMONARY STRESS TESTING: CPT

## 2022-06-10 PROCEDURE — 86140 C-REACTIVE PROTEIN: CPT | Performed by: INTERNAL MEDICINE

## 2022-06-10 RX ORDER — HYDROXYUREA 500 MG/1
1000 CAPSULE ORAL 2 TIMES DAILY
Qty: 60 CAPSULE | Refills: 1 | Status: SHIPPED | OUTPATIENT
Start: 2022-06-10 | End: 2022-07-09 | Stop reason: HOSPADM

## 2022-06-10 RX ORDER — LANCETS 33 GAUGE
1 EACH MISCELLANEOUS
Qty: 100 EACH | Refills: 0 | Status: ON HOLD | OUTPATIENT
Start: 2022-06-10 | End: 2022-06-30

## 2022-06-10 RX ORDER — AZITHROMYCIN 250 MG/1
TABLET, FILM COATED ORAL
Qty: 3 TABLET | Refills: 0 | Status: ON HOLD | OUTPATIENT
Start: 2022-06-11 | End: 2022-06-30

## 2022-06-10 RX ORDER — BLOOD SUGAR DIAGNOSTIC
1 STRIP MISCELLANEOUS
Qty: 100 EACH | Refills: 0 | Status: ON HOLD | OUTPATIENT
Start: 2022-06-10 | End: 2022-06-30

## 2022-06-10 RX ORDER — PREDNISONE 10 MG/1
10 TABLET ORAL DAILY
Qty: 2 TABLET | Refills: 0 | Status: SHIPPED | OUTPATIENT
Start: 2022-06-11 | End: 2022-06-13

## 2022-06-10 RX ORDER — FUROSEMIDE 20 MG/1
20 TABLET ORAL DAILY
Qty: 7 TABLET | Refills: 0 | Status: ON HOLD | OUTPATIENT
Start: 2022-06-10 | End: 2022-06-30

## 2022-06-10 RX ADMIN — Medication 10 ML: at 08:05

## 2022-06-10 RX ADMIN — INSULIN LISPRO 6 UNITS: 100 INJECTION, SOLUTION INTRAVENOUS; SUBCUTANEOUS at 08:03

## 2022-06-10 RX ADMIN — FUROSEMIDE 40 MG: 10 INJECTION, SOLUTION INTRAMUSCULAR; INTRAVENOUS at 08:03

## 2022-06-10 RX ADMIN — ACETAZOLAMIDE 250 MG: 250 TABLET ORAL at 08:03

## 2022-06-10 RX ADMIN — CITALOPRAM HYDROBROMIDE 20 MG: 20 TABLET ORAL at 08:02

## 2022-06-10 RX ADMIN — AZITHROMYCIN MONOHYDRATE 250 MG: 250 TABLET ORAL at 08:03

## 2022-06-10 RX ADMIN — OXYCODONE 10 MG: 5 TABLET ORAL at 11:41

## 2022-06-10 RX ADMIN — GABAPENTIN 300 MG: 300 CAPSULE ORAL at 08:03

## 2022-06-10 RX ADMIN — HYDROXYUREA 1000 MG: 500 CAPSULE ORAL at 08:03

## 2022-06-10 RX ADMIN — INSULIN GLARGINE 20 UNITS: 100 INJECTION, SOLUTION SUBCUTANEOUS at 08:03

## 2022-06-10 RX ADMIN — INSULIN LISPRO 4 UNITS: 100 INJECTION, SOLUTION INTRAVENOUS; SUBCUTANEOUS at 11:37

## 2022-06-10 RX ADMIN — PREDNISONE 20 MG: 20 TABLET ORAL at 08:03

## 2022-06-10 RX ADMIN — OXYCODONE 10 MG: 5 TABLET ORAL at 06:20

## 2022-06-10 RX ADMIN — INSULIN LISPRO 4 UNITS: 100 INJECTION, SOLUTION INTRAVENOUS; SUBCUTANEOUS at 08:04

## 2022-06-10 RX ADMIN — METOPROLOL SUCCINATE 25 MG: 25 TABLET, FILM COATED, EXTENDED RELEASE ORAL at 08:02

## 2022-06-10 RX ADMIN — INSULIN LISPRO 6 UNITS: 100 INJECTION, SOLUTION INTRAVENOUS; SUBCUTANEOUS at 11:37

## 2022-06-10 NOTE — SIGNIFICANT NOTE
06/10/22 1259   Discharge of Care   Discharge Mode wheel chair   Discharged Accompanied by family member/friend   Discharge Contact Information if Applicable Eva Yee   Discharge Teaching Done  Yes   Learning Method Explanation

## 2022-06-10 NOTE — DISCHARGE SUMMARY
[facility] Medicine Services  DISCHARGE SUMMARY    Patient Name: Nieves Mc  : 1975  MRN: [id number]    Discharge condition: Stable  Date of Admission: 2022  Discharge Diagnosis: Hypoxic respiratory failure, CML, TRALI  Date of Discharge: 06/10/2022  Primary Care Physician: Houston Oro MD      Presenting Problem:   Splenomegaly [R16.1]  Generalized abdominal pain [R10.84]  CML (chronic myeloid leukemia) with failed remission (HCC) [C92.10]  Anemia, unspecified type [D64.9]  Uncontrolled type 1 diabetes mellitus with hypoglycemia, unspecified hypoglycemia coma status (HCC) [E10.649]  Hyperglycemia [R73.9]    Active and Resolved Hospital Problems:  Active Hospital Problems    Diagnosis POA   • **CML (chronic myelocytic leukemia) (HCC) [C92.10] Yes   • Moderate malnutrition (HCC) [E44.0] Yes   • CML (chronic myeloid leukemia) with failed remission (HCC) [C92.10] Yes   • Tachycardia [R00.0] Yes   • Atypical pneumonia [J18.9] Unknown   • Severe anemia [D64.9] Yes   • Medically noncompliant [Z91.19] Not Applicable   • Depression with anxiety [F41.8] Yes      Resolved Hospital Problems    Diagnosis POA   • Dyslipidemia [E78.5] Yes         Hospital Course     Hospital Course:  Nieves Mc is a 46 y.o. female who presented to the hospital after being referred by her oncologist due to shortness of breath tachycardia and weakness.  Patient was initially thought to have some pneumonia and was started on antibiotic therapy.  Pulmonary and oncology were consulted.  She was quite anemic and required blood transfusions.  There was some question whether the patient was compliant with her oral chemotherapy.  The patient had a bronchoscopy performed.  It was then determined that the patient likely had transfusion associated lung injury.  She was placed on steroids and improved quite rapidly.  She was on room air and no longer tachycardic.  She was extremely comfortable and  was discharged home once cleared by all consultants on La[place] 10 with instructions to follow-up with oncology as usual.            Reasons For Change In Medications and Indications for New Medications:      Day of Discharge     Vital Signs:  Temp:  [96 °F (35.6 °C)-98.3 °F (36.8 °C)] 98.2 °F (36.8 °C)  Heart Rate:  [86-97] 92  Resp:  [16-20] 20  BP: (109-130)/(60-76) 109/60    Physical Exam:  Physical Exam  Vitals reviewed.   HENT:      Head: Normocephalic.      Nose: Nose normal.      Mouth/Throat:      Mouth: Mucous membranes are moist.   Cardiovascular:      Rate and Rhythm: Normal rate and regular rhythm.      Pulses: Normal pulses.      Heart sounds: Normal heart sounds.   Pulmonary:      Effort: Pulmonary effort is normal.      Breath sounds: Normal breath sounds.   Abdominal:      General: Abdomen is flat.      Palpations: Abdomen is soft.      Comments: Some splenomegaly   Musculoskeletal:         General: Normal range of motion.      Cervical back: Normal range of motion.   Skin:     General: Skin is warm.   Neurological:      General: No focal deficit present.      Mental Status: She is alert and oriented to person, place, and time.   Psychiatric:         Mood and Affect: Mood normal.         Behavior: Behavior normal.            Pertinent  and/or Most Recent Results     LAB RESULTS:      Lab 06/10/22  0321 06/09/22  0453 06/08/22  0358 06/07/22  1540 06/07/22  0328 06/06/22  0949 06/05/22  1348 06/05/22  0403   WBC 57.40* 64.80* 82.50*  --  79.40* 118.70*  --  123.40*   HEMOGLOBIN 8.8* 8.5* 8.0* 8.8* 7.0* 8.0*  --  7.7*   HEMATOCRIT 28.1* 26.8* 26.2* 30.7* 22.4* 26.2*  --  24.8*   PLATELETS 334 326 309  --  305 318  --  312   NEUTROS ABS 32.72* 32.40* 25.58*  --  39.70* 59.35*  --  67.87*   EOS ABS 4.02* 3.24* 5.78*  --  5.56* 3.56*  --  9.87*   MCV 77.2* 77.6* 78.4*  --  75.9* 76.4*  --  77.4*   CRP 1.00*  --  2.29*  --  4.28* 5.70*  --  2.03*   LACTATE  --   --   --   --   --   --  1.3  --          Lab  06/10/22  0321 06/09/22  0453 06/08/22  0358 06/07/22  1540 06/07/22  0328 06/06/22  0949   SODIUM 139 138 138 131* 135* 132*   POTASSIUM 3.8 3.9 3.8 3.9 3.8 3.7   CHLORIDE 103 105 106 97* 101 99   CO2 24.0 22.0 22.0 21.0* 24.0 22.0   ANION GAP 12.0 11.0 10.0 13.0 10.0 11.0   BUN 19 18 14 15 11 9   CREATININE 0.48* 0.52* 0.39* 0.60 0.49* 0.49*   EGFR 118.5 116.2 124.5 112.3 117.9 117.9   GLUCOSE 167* 172* 99 601* 244* 427*   CALCIUM 8.0* 8.2* 8.0* 8.1* 7.8* 8.3*   MAGNESIUM 1.9 1.9 2.0  --  2.1 1.9   PHOSPHORUS 4.3 4.0 3.7  --  4.1 3.8         Lab 06/10/22  0321 06/07/22  0328   TOTAL PROTEIN 6.2 5.8*   ALBUMIN 3.20* 3.10*   GLOBULIN 3.0 2.7   ALT (SGPT) 6 <5   AST (SGOT) 9 5   BILIRUBIN 0.4 0.5   ALK PHOS 742* 908*         Lab 06/05/22  0403   PROBNP 6,007.0*             Lab 06/07/22  0859   ABO TYPING A   RH TYPING Positive   ANTIBODY SCREEN Positive         Brief Urine Lab Results  (Last result in the past 365 days)      Color   Clarity   Blood   Leuk Est   Nitrite   Protein   CREAT   Urine HCG        02/08/22 1533 Yellow   Clear   Negative   Negative   Negative   100 mg/dL (2+)               Microbiology Results (last 10 days)     Procedure Component Value - Date/Time    Aspergillus Galactomannan Antigen - Wash, Lung, R [[id number]] Collected: 06/06/22 1211    Lab Status: Final result Specimen: Wash from Lung, R Updated: 06/09/22 1342     Reference Lab Report See Attached Report    AFB Culture - Wash, Lung, R [[id number]] Collected: 06/06/22 1211    Lab Status: Preliminary result Specimen: Wash from Lung, R Updated: 06/07/22 1001     AFB Stain No acid fast bacilli seen    Respiratory Culture - Wash, Lung, R [[id number]] Collected: 06/06/22 1211    Lab Status: Final result Specimen: Wash from Lung, R Updated: 06/08/22 0954     Respiratory Culture Moderate growth (3+) Normal respiratory viktoriya. No S. aureus or Pseudomonas aeruginosa detected. Final report.     Gram Stain Moderate (3+) WBCs per low power field      Few  (2+) Epithelial cells per low power field      Few (2+) Mixed bacterial morphotypes seen on Gram Stain    Respiratory Panel PCR w/COVID-19(SARS-CoV-2) ZEYAD/EUGENIA/MARVIN/PAD/COR/MAD/CARRIE In-House, NP Swab in UTM/VTM, 3-4 HR TAT - Wash, Lung, R [[id number]]  (Normal) Collected: 06/06/22 1211    Lab Status: Final result Specimen: Wash from Lung, R Updated: 06/06/22 1321     ADENOVIRUS, PCR Not Detected     Coronavirus 229E Not Detected     Coronavirus HKU1 Not Detected     Coronavirus NL63 Not Detected     Coronavirus OC43 Not Detected     COVID19 Not Detected     Human Metapneumovirus Not Detected     Human Rhinovirus/Enterovirus Not Detected     Influenza A PCR Not Detected     Influenza B PCR Not Detected     Parainfluenza Virus 1 Not Detected     Parainfluenza Virus 2 Not Detected     Parainfluenza Virus 3 Not Detected     Parainfluenza Virus 4 Not Detected     RSV, PCR Not Detected     Bordetella pertussis pcr Not Detected     Bordetella parapertussis PCR Not Detected     Chlamydophila pneumoniae PCR Not Detected     Mycoplasma pneumo by PCR Not Detected    Narrative:      In the setting of a positive respiratory panel with a viral infection PLUS a negative procalcitonin without other underlying concern for bacterial infection, consider observing off antibiotics or discontinuation of antibiotics and continue supportive care. If the respiratory panel is positive for atypical bacterial infection (Bordetella pertussis, Chlamydophila pneumoniae, or Mycoplasma pneumoniae), consider antibiotic de-escalation to target atypical bacterial infection.    COVID PRE-OP / PRE-PROCEDURE SCREENING ORDER (NO ISOLATION) - Swab, Nasopharynx [[id number]]  (Normal) Collected: 06/06/22 0642    Lab Status: Final result Specimen: Swab from Nasopharynx Updated: 06/06/22 0743    Narrative:      The following orders were created for panel order COVID PRE-OP / PRE-PROCEDURE SCREENING ORDER (NO ISOLATION) - Swab, Nasopharynx.  Procedure                                Abnormality         Status                     ---------                               -----------         ------                     COVID-19,CEPHEID/JORGITO,CO...[[id number]]  Normal              Final result                 Please view results for these tests on the individual orders.    COVID-19,CEPHEID/JORGITO,COR/MARVIN/PAD/THOMAS IN-HOUSE(OR EMERGENT/ADD-ON),NP SWAB IN TRANSPORT MEDIA 3-4 HR TAT, RT-PCR - Swab, Nasopharynx [[id number]]  (Normal) Collected: 06/06/22 0642    Lab Status: Final result Specimen: Swab from Nasopharynx Updated: 06/06/22 0743     COVID19 Not Detected    Narrative:      Fact sheet for providers: https://www.fda.gov/media/932604/download     Fact sheet for patients: https://www.fda.gov/media/736755/download  Fact sheet for providers: https://www.fda.gov/media/585915/download    Fact sheet for patients: https://www.fda.gov/media/934331/download    Test performed by PCR.    Blood Culture - Blood, Hand, Right [[id number]]  (Normal) Collected: 06/05/22 1348    Lab Status: Preliminary result Specimen: Blood from Hand, Right Updated: 06/09/22 1404     Blood Culture No growth at 4 days    Blood Culture - Blood, Arm, Right [[id number]]  (Normal) Collected: 06/05/22 1348    Lab Status: Preliminary result Specimen: Blood from Arm, Right Updated: 06/09/22 1404     Blood Culture No growth at 4 days    COVID PRE-OP / PRE-PROCEDURE SCREENING ORDER (NO ISOLATION) - Swab, Nasopharynx [[id number]]  (Normal) Collected: 06/01/22 2251    Lab Status: Final result Specimen: Swab from Nasopharynx Updated: 06/01/22 4425    Narrative:      The following orders were created for panel order COVID PRE-OP / PRE-PROCEDURE SCREENING ORDER (NO ISOLATION) - Swab, Nasopharynx.  Procedure                               Abnormality         Status                     ---------                               -----------         ------                     COVID-19,CEPHEID/JORGITO,CO...[[id number]]  Normal              Final result                  Please view results for these tests on the individual orders.    COVID-19,CEPHEID/JORGITO,COR/MARVIN/PAD/THOMAS IN-HOUSE(OR EMERGENT/ADD-ON),NP SWAB IN TRANSPORT MEDIA 3-4 HR TAT, RT-PCR - Swab, Nasopharynx [[id number]]  (Normal) Collected: 06/01/22 2251    Lab Status: Final result Specimen: Swab from Nasopharynx Updated: 06/01/22 2329     COVID19 Not Detected    Narrative:      Fact sheet for providers: https://www.fda.gov/media/627684/download     Fact sheet for patients: https://www.fda.gov/media/814577/download  Fact sheet for providers: https://www.fda.gov/media/780726/download    Fact sheet for patients: https://www.fda.gov/media/202045/download    Test performed by PCR.          CT Abdomen Pelvis With Contrast    Result Date: 6/2/2022  Impression: 1. No acute process seen within the abdomen or pelvis. 2. Marked hepatosplenomegaly. 3. Small amount of pelvic ascites. Electronically signed by:  Juan Colón D.O.  6/1/2022 10:34 PM    XR Chest 1 View    Result Date: 6/8/2022  Impression: Increasing interstitial and alveolar disease in both lungs compared to the chest x-ray of 6/1/2022, suggesting worsening pneumonia or edema.  Electronically Signed By-Heather Kelsey MD On:6/8/2022 9:28 AM This report was finalized on [id number] by  Heather Kelsey MD.    XR Chest 1 View    Result Date: 6/1/2022  Impression: Hazy densities in both lungs, which could represent mild pulmonary edema. This is similar to chest CT from January 2022.  Electronically Signed By-Herbert Eldridge MD On:6/1/2022 10:57 PM This report was finalized on [id number] by  Herbert Eldridge MD.    CT Angiogram Chest Pulmonary Embolism    Result Date: 6/4/2022  Impression: Severe extensive groundglass airspace disease throughout both lungs. This may relate to edema or diffuse infection. ARDS in the differential.  Bilateral pleural effusions, very small size. These are smaller in size than the comparison chest CT.  No evidence of pulmonary  embolism.  Marked hepatomegaly. Normal-sized lymph nodes, no definitive adenopathy in the chest.    Electronically Signed By-Joey Vang On:6/4/2022 2:34 PM This report was finalized on [id number] by  Joey Vang, .    XR Abdomen KUB    Result Date: 6/2/2022  Impression: 1.Opacity of bowel gas left quadrant related to splenomegaly.  Electronically Signed By-Ha Gamboa MD On:6/2/2022 12:03 PM This report was finalized on [id number] by  Ha Gamboa MD.      Results for orders placed during the hospital encounter of 03/11/21    Duplex Venous Lower Extremity - Left    Interpretation Summary  · Normal left lower extremity venous duplex scan.      Results for orders placed during the hospital encounter of 03/11/21    Duplex Venous Lower Extremity - Left    Interpretation Summary  · Normal left lower extremity venous duplex scan.      Results for orders placed during the hospital encounter of 06/01/22    Adult Transthoracic Echo Complete W/ Cont if Necessary Per Protocol    Interpretation Summary  · Left ventricular systolic function is low normal.  · Left ventricular ejection fraction is 55%  · Left ventricular diastolic function was normal.      Labs Pending at Discharge:  Pending Labs     Order Current Status    B Pertussis IgG / IgM Ab In process    Cytomegalovirus (CMV) By PCR - Wash, Lung, R In process    Fungus Culture - Wash, Lung, R In process    Histoplasma Antigen, CSF or BAL - Wash, Lung, R In process    Pneumocystis PCR - Wash, Lung, R In process    Transfusion Reaction Evaluation In process    Transfusion Reaction Evaluation In process    AFB Culture - Wash, Lung, R Preliminary result    Blood Culture - Blood, Arm, Right Preliminary result    Blood Culture - Blood, Hand, Right Preliminary result          Procedures Performed  Procedure(s):  BRONCHOSCOPY bilateral lung washing         Consults:   Consults     Date and Time Order Name Status Description    6/5/2022  8:26 AM Inpatient Pulmonology  Consult Completed     6/1/2022 10:39 PM Hematology and Oncology (on-call MD unless specified)      6/1/2022 10:39 PM Hospitalist (on-call MD unless specified)              Discharge Details        Discharge Medications      New Medications      Instructions Start Date   azithromycin 250 MG tablet  Commonly known as: ZITHROMAX   Pna 3 more days   Start Date: June 11, 2022     furosemide 20 MG tablet  Commonly known as: Lasix   20 mg, Oral, Daily      hydroxyurea 500 MG capsule  Commonly known as: HYDREA   1,000 mg, Oral, 2 Times Daily      OneTouch Delica Plus Ptiynh07A misc   1 each, Does not apply, 3 Times Daily Before Meals, Dx: E11.65      OneTouch Verio test strip  Generic drug: glucose blood   1 each, Other, 3 Times Daily Before Meals, Dx: E11.65. Use as instructed      predniSONE 10 MG tablet  Commonly known as: DELTASONE   10 mg, Oral, Daily   Start Date: June 11, 2022        Continue These Medications      Instructions Start Date   acetaZOLAMIDE 250 MG tablet  Commonly known as: DIAMOX   250 mg, Oral, Daily      ALPRAZolam 0.25 MG tablet  Commonly known as: Xanax   0.25 mg, Oral, Nightly PRN      citalopram 10 MG tablet  Commonly known as: CeleXA   10 mg, Oral, Daily, To begin 1/31/22      gabapentin 300 MG capsule  Commonly known as: NEURONTIN   300 mg, Oral, 3 Times Daily      HumaLOG KwikPen 100 UNIT/ML solution pen-injector  Generic drug: Insulin Lispro (1 Unit Dial)   1 Units, Subcutaneous, 3 Times Daily Before Meals, Per sliding scale: 151-200 4 units, 201-250 6 units, 251-300 8 units with max dose of 45 units      Insulin Glargine-Lixisenatide 100-33 UNT-MCG/ML solution pen-injector injection  Commonly known as: SOLIQUA   25 Units, Daily      metoprolol succinate XL 25 MG 24 hr tablet  Commonly known as: TOPROL-XL   25 mg, Oral, Daily      nilotinib 150 MG capsule capsule  Commonly known as: TASIGNA   150 mg, Oral, 2 Times Daily Before Meals      oxyCODONE-acetaminophen  MG per tablet  Commonly  known as: PERCOCET   1 tablet, Oral, Every 8 Hours PRN      rivaroxaban 20 MG tablet  Commonly known as: XARELTO   20 mg, Oral, Daily      traZODone 50 MG tablet  Commonly known as: DESYREL   50 mg, Oral, Nightly             Allergies   Allergen Reactions   • Hydromorphone GI Intolerance     dilaudid   • Morphine Nausea And Vomiting   • Propofol Hives         Discharge Disposition:   Home or Self Care    Diet:  Hospital:  Diet Order   Procedures   • Diet Diabetic/Consistent Carbs; Diabetic - Consistent Carb         Discharge Activity:         CODE STATUS:  Code Status and Medical Interventions:   Ordered at: 06/02/22 0128     Code Status (Patient has no pulse and is not breathing):    CPR (Attempt to Resuscitate)     Medical Interventions (Patient has pulse or is breathing):    Full Support         No future appointments.        Time spent on Discharge including face to face service:  55 minutes    This patient has been examined wearing appropriate Personal Protective Equipment and discussed with Patient. 06/10/22      Signature: Villa Patel MD

## 2022-06-10 NOTE — PLAN OF CARE
Goal Outcome Evaluation:  Plan of Care Reviewed With: patient   Pt slept well,pain well controled with Oxy see Mar.will continue to monitor

## 2022-06-10 NOTE — PROGRESS NOTES
Hematology/Oncology Inpatient Progress Note    PATIENT NAME: Nieves Mc  : 1975  MRN: [id number]    Chief complaint: Uncontrolled Leukemia     History of present illness:      Nieves Mc is a 46 y.o. female who presented to [facility] on 2022 with complaints of cough for over a month, and SOB.  Patient also has a history of progressive fatigue but denies bleeding.  There is history of contact with someone with cough.  She denies fevers, chills, nausea or vomiting.  She complains of left upper abdominal discomfort at the site of her splenic enlargement.  She was sent to the ER due to tachycardia, significant anemia possible pneumonia.     22  Hematology/Oncology was consulted as she has a history of CML on to Tasigna twice a day.  Patient has been very noncompliant with her treatment.  She was seen in the office with a white count of more than 200,000, significant anemia with hemoglobin of 7.7 and a history of prolonged cough for the past 1 and half months.  Patient has not been seen in the office since end of 2022.  She has a history of noncompliance with her medications.         She  has a past medical history of Bone pain, Extremity pain, Leg pain, Migraine, Pulmonary embolism (HCC), and Vision loss.     PCP: Houston Oro MD    Subjective      Patient feels better today. She is ready for discharge        MEDICATIONS:      Scheduled Meds:  acetaZOLAMIDE, 250 mg, Oral, Daily  azithromycin, 250 mg, Oral, Q24H  citalopram, 20 mg, Oral, Daily  furosemide, 40 mg, Intravenous, Daily  gabapentin, 300 mg, Oral, TID  hydroxyurea, 1,000 mg, Oral, BID  insulin glargine, 20 Units, Subcutaneous, QAM  insulin lispro, 0-24 Units, Subcutaneous, TID AC  insulin lispro, 6 Units, Subcutaneous, TID With Meals  metoprolol succinate XL, 25 mg, Oral, Daily  [START ON 2022] predniSONE, 10 mg, Oral, Daily  sodium chloride, 10 mL, Intravenous, Q12H  sodium chloride, 10 mL,  "Intravenous, Q12H  traZODone, 50 mg, Oral, Nightly       Continuous Infusions:      PRN Meds:  •  acetaminophen **OR** acetaminophen **OR** acetaminophen  •  aluminum-magnesium hydroxide-simethicone  •  dextrose  •  dextrose  •  glucagon (human recombinant)  •  insulin lispro **AND** insulin lispro  •  magnesium sulfate **OR** magnesium sulfate **OR** magnesium sulfate  •  melatonin  •  nitroglycerin  •  ondansetron **OR** ondansetron  •  oxyCODONE  •  [COMPLETED] Insert peripheral IV **AND** sodium chloride  •  sodium chloride  •  sodium chloride     ALLERGIES:    Allergies   Allergen Reactions   • Hydromorphone GI Intolerance     dilaudid   • Morphine Nausea And Vomiting   • Propofol Hives     A 12 point review of systems was conducted.  All pertinent positives and negatives are documented above.  Objective    VITALS:   /60   Pulse 92   Temp 98.2 °F (36.8 °C) (Oral)   Resp 20   Ht 162.6 cm (64\")   Wt 56.1 kg (123 lb 10.9 oz)   SpO2 94%   BMI 21.23 kg/m²     PHYSICAL EXAM: (performed by MD)  Physical Exam  Constitutional:       General: She is in acute distress.      Appearance: She is ill-appearing. She is not toxic-appearing or diaphoretic.   HENT:      Head: Normocephalic and atraumatic.      Right Ear: External ear normal.      Left Ear: External ear normal.      Nose: Nose normal. No congestion or rhinorrhea.      Mouth/Throat:      Mouth: Mucous membranes are moist.      Pharynx: Oropharynx is clear. No oropharyngeal exudate or posterior oropharyngeal erythema.      Comments: Very poor dentition  Eyes:      General:         Right eye: No discharge.         Left eye: No discharge.      Pupils: Pupils are equal, round, and reactive to light.   Cardiovascular:      Rate and Rhythm: Regular rhythm. Tachycardia present.      Pulses: Normal pulses.      Heart sounds: Normal heart sounds. No murmur heard.    No friction rub. No gallop.   Pulmonary:      Effort: No respiratory distress.      Breath " sounds: No stridor. Wheezing and rhonchi present.      Comments: Labored.  The lungs are markedly diminished and there are bilateral fine crackles and wheezes throughout both sides.  Abdominal:      General: There is no distension.      Palpations: Abdomen is soft. There is no mass.      Tenderness: There is no abdominal tenderness.      Comments: Rounded, protuberant.  Soft and nontender.   Musculoskeletal:         General: Normal range of motion.      Cervical back: Normal range of motion and neck supple. No rigidity or tenderness.      Right lower leg: No edema.      Left lower leg: No edema.   Lymphadenopathy:      Cervical: No cervical adenopathy.   Skin:     General: Skin is warm.      Coloration: Skin is pale. Skin is not jaundiced.      Findings: No bruising or erythema.   Neurological:      General: No focal deficit present.      Mental Status: She is alert and oriented to person, place, and time.      Cranial Nerves: No cranial nerve deficit.   Psychiatric:         Mood and Affect: Mood normal.         Behavior: Behavior normal.         Thought Content: Thought content normal.         Judgment: Judgment normal.     I have reexamined the patient and the results are consistent with the previously documented exam. Meghann Lerma MD     RECENT LABS:    Lab Results (last 24 hours)     Procedure Component Value Units Date/Time    POC Glucose Once [[id number]]  (Abnormal) Collected: 06/10/22 1123    Specimen: Blood Updated: 06/10/22 1135     Glucose 220 mg/dL      Comment: Serial Number: [id number]:  [id number]       POC Glucose Once [[id number]]  (Abnormal) Collected: 06/10/22 0725    Specimen: Blood Updated: 06/10/22 0730     Glucose 162 mg/dL      Comment: Serial Number: [id number]:  [id number]       Manual Differential [[id number]]  (Abnormal) Collected: 06/10/22 0321    Specimen: Blood Updated: 06/10/22 0459     Neutrophil % 48.0 %      Lymphocyte % 6.0 %      Monocyte % 4.0 %       Eosinophil % 7.0 %      Basophil % 14.0 %      Bands %  9.0 %      Blasts % 12.0 %      Neutrophils Absolute 32.72 10*3/mm3      Lymphocytes Absolute 3.44 10*3/mm3      Monocytes Absolute 2.30 10*3/mm3      Eosinophils Absolute 4.02 10*3/mm3      Basophils Absolute 8.04 10*3/mm3      nRBC 3.0 /100 WBC      Anisocytosis Slight/1+     Polychromasia Slight/1+     WBC Morphology Normal     Large Platelets Slight/1+    Narrative:      Reviewed by Pathologist within the past 30 days on 06.03.2022.      CBC & Differential [[id number]]  (Abnormal) Collected: 06/10/22 0321    Specimen: Blood Updated: 06/10/22 0459    Narrative:      The following orders were created for panel order CBC & Differential.  Procedure                               Abnormality         Status                     ---------                               -----------         ------                     CBC Auto Differential[[id number]]        Abnormal            Final result               Scan Slide[[id number]]                                       Final result                 Please view results for these tests on the individual orders.    Scan Slide [[id number]] Collected: 06/10/22 0321    Specimen: Blood Updated: 06/10/22 0459     Scan Slide --     Comment: See Manual Differential Results       CBC Auto Differential [[id number]]  (Abnormal) Collected: 06/10/22 0321    Specimen: Blood Updated: 06/10/22 0459     WBC 57.40 10*3/mm3      RBC 3.64 10*6/mm3      Hemoglobin 8.8 g/dL      Hematocrit 28.1 %      MCV 77.2 fL      MCH 24.2 pg      MCHC 31.4 g/dL      RDW 19.1 %      RDW-SD 50.8 fl      MPV 7.8 fL      Platelets 334 10*3/mm3     Narrative:      The previously reported component NRBC is no longer being reported. Previous result was 0.8 /100 WBC (Reference Range: 0.0-0.2 /100 WBC) on 6/10/2022 at 0400 EDT.    Comprehensive Metabolic Panel [[id number]]  (Abnormal) Collected: 06/10/22 0321    Specimen: Blood Updated: 06/10/22 0433     Glucose 167 mg/dL       BUN 19 mg/dL      Creatinine 0.48 mg/dL      Sodium 139 mmol/L      Potassium 3.8 mmol/L      Chloride 103 mmol/L      CO2 24.0 mmol/L      Calcium 8.0 mg/dL      Total Protein 6.2 g/dL      Albumin 3.20 g/dL      ALT (SGPT) 6 U/L      AST (SGOT) 9 U/L      Alkaline Phosphatase 742 U/L      Total Bilirubin 0.4 mg/dL      Globulin 3.0 gm/dL      A/G Ratio 1.1 g/dL      BUN/Creatinine Ratio 39.6     Anion Gap 12.0 mmol/L      eGFR 118.5 mL/min/1.73      Comment: National Kidney Foundation and American Society of Nephrology (ASN) Task Force recommended calculation based on the Chronic Kidney Disease Epidemiology Collaboration (CKD-EPI) equation refit without adjustment for race.       Narrative:      GFR Normal >60  Chronic Kidney Disease <60  Kidney Failure <15      Phosphorus [[id number]]  (Normal) Collected: 06/10/22 0321    Specimen: Blood Updated: 06/10/22 0419     Phosphorus 4.3 mg/dL     Magnesium [[id number]]  (Normal) Collected: 06/10/22 0321    Specimen: Blood Updated: 06/10/22 0419     Magnesium 1.9 mg/dL     C-reactive Protein [[id number]]  (Abnormal) Collected: 06/10/22 0321    Specimen: Blood Updated: 06/10/22 0419     C-Reactive Protein 1.00 mg/dL     POC Glucose Once [[id number]]  (Abnormal) Collected: 06/09/22 2023    Specimen: Blood Updated: 06/10/22 0018     Glucose 399 mg/dL      Comment: Serial Number: [id number]:  [id number]       POC Glucose Once [[id number]]  (Abnormal) Collected: 06/09/22 1654    Specimen: Blood Updated: 06/09/22 1655     Glucose 355 mg/dL      Comment: Serial Number: [id number]:  [id number]             IMAGING REVIEWED:  No radiology results for the last day    Assessment & Plan     1.  Chronic myelogenous leukemia on hydroxyurea.  The white blood cell count, while still markedly elevated, has come down significantly since the institution of the hydroxyurea.  It has had some impact on the hemoglobin and we will continue to monitor.  Renal function was reviewed and  continues to be adequate.  Continue to monitor her CBC closely.  Continue hydroxyurea at current doses. Patient advised.  2.  Reviewed the images and the report of this recent scans.  While pulmonary embolism was not documented, there is extensive interstitial infiltrate involving both lungs.  The picture is quite concerning, particularly because her oxygen saturation has been dropping.  Status post bronchoscopy on 6/7/2022.  Await culture results.  On p.o. antibiotics per pulmonary.  Overall she has improved.  3.Daily CBCs.  Her hemoglobin today is 8.3 g per DL  4.  We will continue to follow    Discussed with patient    Electronically signed by Meghann Lerma MD, 06/10/22, 3:43 PM EDT.

## 2022-06-10 NOTE — CASE MANAGEMENT/SOCIAL WORK
Continued Stay Note  ZOHAIB Amor     Patient Name: Nieves Mc  MRN: [id number]  Today's Date: 6/10/2022    Admit Date: 6/1/2022         Row Name 06/10/22 1234       Plan    Plan Discharge home with family.    Plan Comments Discharge home with family, no O2 home needs.              Expected Discharge Date and Time     Expected Discharge Date Expected Discharge Time    Anibal 10, 2022         Met with patient in room wearing PPE: mask    Maintained distance greater than six feet and spent less than 15 minutes in the room.          Sweta Chawla RN        =========================================================================    Case Management Discharge Note      Final Note: Home    Transportation Services  Private: Car    Final Discharge Disposition Code: 01 - home or self-care

## 2022-06-10 NOTE — PROGRESS NOTES
Exercise Oximetry    Patient Name:Nieves Mc   MRN: [id number]   Date: 06/10/22             ROOM AIR BASELINE   SpO2% 95   Heart Rate 89   Blood Pressure      EXERCISE ON ROOM AIR SpO2% EXERCISE ON O2 @  LPM SpO2%   1 MINUTE 94 1 MINUTE    2 MINUTES 96 2 MINUTES    3 MINUTES 95 3 MINUTES    4 MINUTES 96 4 MINUTES    5 MINUTES 96 5 MINUTES    6 MINUTES 96 6 MINUTES               Distance Walked  6 minutes Distance Walked   Dyspnea ([place] Scale)   Dyspnea ([place] Scale)   Fatigue ([place] Scale)   Fatigue ([place] Scale)   SpO2% Post Exercise  98 SpO2% Post Exercise   HR Post Exercise  98 HR Post Exercise   Time to Recovery  immediate Time to Recovery     Comments: Patient walked in hallway for 6 minutes on ROOM AIR with no desaturation episodes.  Thank you.

## 2022-06-10 NOTE — PROGRESS NOTES
Daily Progress Note        CML (chronic myelocytic leukemia) (HCC)    Depression with anxiety    Severe anemia    Medically noncompliant    Tachycardia    CML (chronic myeloid leukemia) with failed remission (HCC)    Moderate malnutrition (HCC)    Atypical pneumonia      Assessment    Groundglass opacities with severe cough     CML (chronic myelocytic leukemia) (HCC)  Severe extensive bilateral groundglass opacities: Differential diagnosis Transfusion-related acute lung injury (TRALI), transfusion-associated circulatory overload (TACO), atypical pneumonia, ARDS and/or pulmonary edema: CT scan 6/4/2022 negative for PE    6/6/2022 bronchoscopy, respiratory panel negative  No evidence of alveolar hemorrhage  No significant secretions noted endobronchially  Bilateral lung washing was done with return of cloudy fluid    History of COVID-19 in January 2022    CML with severe leukocytosis    Severe anemia  Diabetes mellitus type 2 with hyperglycemia  Mild hyponatremia  History of depression and anxiety    2D echo 6/5/2022  EF 58%  RVSP 46.6 mmHg        Recommendations:    6-minute walk prior to discharge    Antibiotic Azithromycin p.o. x7 days  Prednisone 6-day taper    Oxygen supplement and titration maintain saturation 90 to 95%: Currently on room air    Diamox 250 mg daily,  Lasix 40 mg daily  Blood pressure control  Glucose control, off insulin drip and started on high-dose sliding scale    Completed 5 days of Rocephin                 LOS: 7 days     Subjective         Objective     Vital signs for last 24 hours:  Vitals:    06/09/22 1743 06/09/22 2217 06/10/22 0130 06/10/22 0510   BP: 130/75 123/66 121/62 120/74   BP Location:  Right arm Right arm Right arm   Patient Position:  Lying Lying Lying   Pulse: 97 88 86 87   Resp: 20 16 16 16   Temp: 96 °F (35.6 °C) 98.2 °F (36.8 °C) 98.2 °F (36.8 °C) 97.8 °F (36.6 °C)   TempSrc: Oral Oral Oral Oral   SpO2: 99% 95% 96% 96%   Weight:       Height:           Intake/Output last  3 shifts:  I/O last 3 completed shifts:  In: 840 [P.O.:840]  Out: -   Intake/Output this shift:  No intake/output data recorded.      Radiology  Imaging Results (Last 24 Hours)     ** No results found for the last 24 hours. **          Labs:  Results from last 7 days   Lab Units 06/10/22  0321   WBC 10*3/mm3 57.40*   HEMOGLOBIN g/dL 8.8*   HEMATOCRIT % 28.1*   PLATELETS 10*3/mm3 334     Results from last 7 days   Lab Units 06/10/22  0321   SODIUM mmol/L 139   POTASSIUM mmol/L 3.8   CHLORIDE mmol/L 103   CO2 mmol/L 24.0   BUN mg/dL 19   CREATININE mg/dL 0.48*   CALCIUM mg/dL 8.0*   BILIRUBIN mg/dL 0.4   ALK PHOS U/L 742*   ALT (SGPT) U/L 6   AST (SGOT) U/L 9   GLUCOSE mg/dL 167*         Results from last 7 days   Lab Units 06/10/22  0321 06/07/22  0328   ALBUMIN g/dL 3.20* 3.10*             Results from last 7 days   Lab Units 06/10/22  0321   MAGNESIUM mg/dL 1.9                   Meds:   SCHEDULE  acetaZOLAMIDE, 250 mg, Oral, Daily  azithromycin, 250 mg, Oral, Q24H  citalopram, 20 mg, Oral, Daily  furosemide, 40 mg, Intravenous, Daily  gabapentin, 300 mg, Oral, TID  hydroxyurea, 1,000 mg, Oral, BID  insulin glargine, 20 Units, Subcutaneous, QAM  insulin lispro, 0-24 Units, Subcutaneous, TID AC  insulin lispro, 6 Units, Subcutaneous, TID With Meals  metoprolol succinate XL, 25 mg, Oral, Daily  predniSONE, 20 mg, Oral, Daily   Followed by  [START ON 6/11/2022] predniSONE, 10 mg, Oral, Daily  sodium chloride, 10 mL, Intravenous, Q12H  sodium chloride, 10 mL, Intravenous, Q12H  traZODone, 50 mg, Oral, Nightly      Infusions     PRNs  •  acetaminophen **OR** acetaminophen **OR** acetaminophen  •  aluminum-magnesium hydroxide-simethicone  •  dextrose  •  dextrose  •  glucagon (human recombinant)  •  insulin lispro **AND** insulin lispro  •  magnesium sulfate **OR** magnesium sulfate **OR** magnesium sulfate  •  melatonin  •  nitroglycerin  •  ondansetron **OR** ondansetron  •  oxyCODONE  •  [COMPLETED] Insert peripheral  IV **AND** sodium chloride  •  sodium chloride  •  sodium chloride    Physical Exam:  Physical Exam  Vitals reviewed.   Pulmonary:      Breath sounds: Rhonchi present.   Skin:     General: Skin is warm and dry.   Neurological:      Mental Status: She is alert and oriented to person, place, and time.         ROS  Review of Systems   Respiratory: Positive for cough and shortness of breath.        I have reviewed the patient's new clinical results.    Electronically signed by JP Paige.

## 2022-06-10 NOTE — CONSULTS
"Nutrition Services    Patient Name: Nieves Mc  YOB: 1975  MRN: [id number]  Admission date: 2022    Comment:  -- Continue Chocolate Boost Glucose Control BID (Provides 380 kcals, 32 g protein if consumed)     -- Moderate chronic disease related malnutrition related to hypermetabolic state of leukemia as evidenced by fat/muscle loss per physical exam.  See MSA below.      PPE Documentation        PPE Worn By Provider mask, gloves and eye protection   PPE Worn By Patient  None      CLINICAL NUTRITION ASSESSMENT      Reason for Assessment Follow up protocol   6/3: Consult for Nursing Admission Screen, MST of 4, A1c 10.2%     H&P  46 y.o. female with a past medical history of CML, diabetes mellitus type 2, diabetic neuropathy, severe anemia, dyslipidemia, depression with anxiety, and medical noncompliance.  She presented to [facility] on 2022 from her oncologist's office because \"her leukemia is not controlled\".  She reports abdominal pain which has been occurring since January in addition to a cough.  She states that she does have chest pain when she coughs at times.  She reports a chronic ache in her legs. and that her blood sugar has been high.  Per oncology she remains in the chronic phase based on her bone marrow and she is noncompliant with her treatment.    Past Medical History:   Diagnosis Date   • Bone pain    • Extremity pain     Carlin. legs pain   • Leg pain     left leg greater   • Migraine    • Pulmonary embolism (HCC)    • Vision loss     doing surgery       Past Surgical History:   Procedure Laterality Date   • BONE MARROW BIOPSY     • BREAST SURGERY     • BRONCHOSCOPY N/A 2022    Procedure: BRONCHOSCOPY bilateral lung washing;  Surgeon: Charlene Camara MD;  Location: [facility];  Service: Pulmonary;  Laterality: N/A;  post: rule out infection vs transfusion lung injury   •  SECTION     • CHOLECYSTECTOMY     • EYE SURGERY      laser surgery due  to " "hemmorage--- 5/13/2021-- another surgery  lt eye11/15/21   • RETINAL DETACHMENT SURGERY     • SPINE SURGERY      Lombardi spinal block   • TUBAL ABDOMINAL LIGATION          Current Problems   CML with associated chronic pain  -Dr. Lerma consulted     Severe anemia    Diabetes mellitus type 2  -Hemoglobin A1c ordered    Depression with anxiety    Tachycardia       Encounter Information        Trending Narrative     6/10: Possibly D/C today.  RD visited patient at bedside and she was dressed and ready to leave.  No nutritional concerns from patient.      6/3: Noted diabetes educator saw patient this admission.  RD visited patient at bedside.  Patient refuses further diet education my RD during visit.  Claims she cannot check her blood sugar because she is \"partially blind\".  Patient states ok appetite, reports nausea but getting PRN Zofran.  No chewing/swallowing issues noted, reports 100# weight loss x 6 months (240# UBW).       Anthropometrics        Current Height, Weight Height: 162.6 cm (64\")  Weight: 56.1 kg (123 lb 10.9 oz) (06/10/22 0500)       Ideal Body Weight (IBW) 120#   Usual Body Weight (UBW) 240# per patient        Trending Weight Hx     This admission: 6/10: 1.6% weight loss since last full review     6/3: 125#             PTA: 5.9% weight gain x 3 months  6.1% weight loss x 1 year     Wt Readings from Last 30 Encounters:   06/10/22 0500 56.1 kg (123 lb 10.9 oz)   06/09/22 0500 58.5 kg (128 lb 15.5 oz)   06/05/22 0929 56.7 kg (125 lb)   06/01/22 1625 56.7 kg (125 lb)   06/01/22 1440 56.7 kg (125 lb)   05/18/22 0920 56.7 kg (125 lb)   05/09/22 1114 56.7 kg (125 lb)   03/29/22 1606 57 kg (125 lb 9.6 oz)   03/01/22 1604 53.5 kg (118 lb)   02/14/22 0850 53.5 kg (118 lb)   01/28/22 1202 58.1 kg (128 lb)   01/24/22 1117 60.3 kg (133 lb)   01/17/22 0536 65.1 kg (143 lb 8.3 oz)   01/16/22 0300 65.1 kg (143 lb 8.3 oz)   01/15/22 0443 64.8 kg (142 lb 13.7 oz)   01/14/22 0425 65.1 kg (143 lb 8.3 oz)   01/13/22 " 0219 65 kg (143 lb 4.8 oz)   01/12/22 1237 59 kg (130 lb)   11/15/21 1010 59 kg (130 lb)   10/18/21 0814 59 kg (130 lb)   09/28/21 0919 59.3 kg (130 lb 12.8 oz)   09/22/21 1004 60.3 kg (133 lb)   07/19/21 0909 60.3 kg (133 lb)   06/16/21 0843 60.3 kg (133 lb)   06/07/21 0920 60.3 kg (133 lb)   04/19/21 0855 64.3 kg (141 lb 12.8 oz)   04/13/21 1817 61.2 kg (135 lb)   03/11/21 0418 66 kg (145 lb 8.1 oz)   02/22/21 0548 61.7 kg (136 lb 0.4 oz)   10/26/20 1901 68.3 kg (150 lb 9.2 oz)   10/16/19 1811 76 kg (167 lb 8.8 oz)   07/01/19 2239 74.8 kg (165 lb)   06/30/19 1555 76.5 kg (168 lb 10.4 oz)   11/21/16 1425 72.6 kg (160 lb)      BMI kg/m2 Body mass index is 21.23 kg/m².       Labs        Pertinent Labs    Results from last 7 days   Lab Units 06/10/22  0321 06/09/22  0453 06/08/22  0358 06/07/22  1540 06/07/22  0328   SODIUM mmol/L 139 138 138   < > 135*   POTASSIUM mmol/L 3.8 3.9 3.8   < > 3.8   CHLORIDE mmol/L 103 105 106   < > 101   CO2 mmol/L 24.0 22.0 22.0   < > 24.0   BUN mg/dL 19 18 14   < > 11   CREATININE mg/dL 0.48* 0.52* 0.39*   < > 0.49*   CALCIUM mg/dL 8.0* 8.2* 8.0*   < > 7.8*   BILIRUBIN mg/dL 0.4  --   --   --  0.5   ALK PHOS U/L 742*  --   --   --  908*   ALT (SGPT) U/L 6  --   --   --  <5   AST (SGOT) U/L 9  --   --   --  5   GLUCOSE mg/dL 167* 172* 99   < > 244*    < > = values in this interval not displayed.     Results from last 7 days   Lab Units 06/10/22  0321 06/09/22  0453 06/08/22  0358   MAGNESIUM mg/dL 1.9 1.9 2.0   PHOSPHORUS mg/dL 4.3 4.0 3.7   HEMOGLOBIN g/dL 8.8* 8.5* 8.0*   HEMATOCRIT % 28.1* 26.8* 26.2*     COVID19   Date Value Ref Range Status   06/06/2022 Not Detected Not Detected - Ref. Range Final     Lab Results   Component Value Date    HGBA1C 10.2 (H) 06/02/2022        Medications    Scheduled Medications acetaZOLAMIDE, 250 mg, Oral, Daily  azithromycin, 250 mg, Oral, Q24H  citalopram, 20 mg, Oral, Daily  furosemide, 40 mg, Intravenous, Daily  gabapentin, 300 mg, Oral,  TID  hydroxyurea, 1,000 mg, Oral, BID  insulin glargine, 20 Units, Subcutaneous, QAM  insulin lispro, 0-24 Units, Subcutaneous, TID AC  insulin lispro, 6 Units, Subcutaneous, TID With Meals  metoprolol succinate XL, 25 mg, Oral, Daily  [START ON 6/11/2022] predniSONE, 10 mg, Oral, Daily  sodium chloride, 10 mL, Intravenous, Q12H  sodium chloride, 10 mL, Intravenous, Q12H  traZODone, 50 mg, Oral, Nightly        Infusions      PRN Medications •  acetaminophen **OR** acetaminophen **OR** acetaminophen  •  aluminum-magnesium hydroxide-simethicone  •  dextrose  •  dextrose  •  glucagon (human recombinant)  •  insulin lispro **AND** insulin lispro  •  magnesium sulfate **OR** magnesium sulfate **OR** magnesium sulfate  •  melatonin  •  nitroglycerin  •  ondansetron **OR** ondansetron  •  oxyCODONE  •  [COMPLETED] Insert peripheral IV **AND** sodium chloride  •  sodium chloride  •  sodium chloride     Physical Findings        Trending Physical   Appearance, NFPE 6/10: Did not complete NFPE on this date    6/3: NFPE completed, consistent with nutrition diagnosis of malnutrition using AND/ASPEN criteria. See MSA below.      --  Edema  No edema documented      Bowel Function Last BM 6/9 (yesterday)     Tubes No feeding tube      Chewing/Swallowing No issues per patient      Skin Intact      --  Current Nutrition Orders & Evaluation of Intake       Oral Nutrition     Food Allergies NKFA   Current PO Diet Diet Diabetic/Consistent Carbs; Diabetic - Consistent Carb   Supplement None ordered    PO Evaluation     Trending % PO Intake 6/10: 48% average PO intakes since last full review     6/3: 75% average PO intakes since admission    --  Nutritional Risk Screening        NRS-2002 Score          Nutrition Diagnosis         Nutrition Dx Problem 1 Moderate chronic disease related malnutrition related to hypermetabolic state of leukemia as evidenced by fat/muscle loss per physical exam.        Nutrition Dx Problem 2         Intervention Goal         Intervention Goal(s) PO intakes greater than 75%  Accept ONS  No weight loss     Nutrition Intervention        RD Action Continue ONS     Nutrition Prescription          Diet Prescription Consistent CHO    Supplement Prescription Chocolate Boost Glucose Control BID   --  Monitor/Evaluation        Monitor Per protocol, I&O, PO intake, Supplement intake, Pertinent labs, Weight, Skin status, GI status, Symptoms, POC/GOC     Malnutrition Severity Assessment      Patient meets criteria for : Moderate (non-severe) Malnutrition       Electronically signed by:  Chayo Robert RD  06/10/22 10:01 EDT

## 2022-06-11 LAB
B PERT IGG SER IA-ACNC: 2.44 INDEX (ref 0–0.94)
B PERT IGM SER IA-ACNC: <1 INDEX (ref 0–0.9)
BH BB BLOOD EXPIRATION DATE: NORMAL
BH BB BLOOD EXPIRATION DATE: NORMAL
BH BB BLOOD TYPE BARCODE: 5100
BH BB BLOOD TYPE BARCODE: 5100
BH BB DISPENSE STATUS: NORMAL
BH BB DISPENSE STATUS: NORMAL
BH BB PRODUCT CODE: NORMAL
BH BB PRODUCT CODE: NORMAL
BH BB UNIT NUMBER: NORMAL
BH BB UNIT NUMBER: NORMAL
CROSSMATCH INTERPRETATION: NORMAL
CROSSMATCH INTERPRETATION: NORMAL
UNIT  ABO: NORMAL
UNIT  ABO: NORMAL
UNIT  RH: NORMAL
UNIT  RH: NORMAL

## 2022-06-11 NOTE — OUTREACH NOTE
Prep Survey    Flowsheet Row Responses   [facility] facility [facility] discharged from? [facility]   Is LACE score < 7 ? No   Emergency Room discharge w/ pulse ox? No   Eligibility Readm Mgmt   Discharge diagnosis chronic myelocytic leukemia   Does the patient have one of the following disease processes/diagnoses(primary or secondary)? Other   Does the patient have Home health ordered? No   Is there a DME ordered? No   Prep survey completed? Yes          JESSIE PETERSON - Registered Nurse

## 2022-06-13 ENCOUNTER — TELEPHONE (OUTPATIENT)
Dept: ONCOLOGY | Facility: CLINIC | Age: 47
End: 2022-06-13

## 2022-06-13 LAB
P JIROVECII DNA L RESP QL NAA+NON-PROBE: NEGATIVE
REF LAB TEST METHOD: NORMAL
TRANSFUSION REACTION INTERPRETATION 1: NORMAL

## 2022-06-13 PROCEDURE — 86900 BLOOD TYPING SEROLOGIC ABO: CPT

## 2022-06-13 PROCEDURE — 86901 BLOOD TYPING SEROLOGIC RH(D): CPT

## 2022-06-13 PROCEDURE — 86880 COOMBS TEST DIRECT: CPT

## 2022-06-13 NOTE — TELEPHONE ENCOUNTER
Caller: Sayra Cabezas    Relationship to patient: Mother    Best call back number: [phone]    Chief complaint:  DAUGHTER JUST GOT OUT OF HOSPITAL, THEY WANTED HER TO FOLLOW UP BACK WITH DR SCOTT    Type of visit: FOLLOW UP     Requested date:     NEEDS ANY WED AFTER 4PM DUE TO TRANSPORTATION    If rescheduling, when is the original appointment: N/A    Additional notes:

## 2022-06-14 ENCOUNTER — TELEPHONE (OUTPATIENT)
Dept: ONCOLOGY | Facility: CLINIC | Age: 47
End: 2022-06-14

## 2022-06-14 ENCOUNTER — READMISSION MANAGEMENT (OUTPATIENT)
Dept: CALL CENTER | Facility: HOSPITAL | Age: 47
End: 2022-06-14

## 2022-06-14 NOTE — OUTREACH NOTE
Medical Week 1 Survey    Flowsheet Row Responses   [facility] [place] facility patient discharged from? [facility]   Does the patient have one of the following disease processes/diagnoses(primary or secondary)? Other   Week 1 attempt successful? No   Unsuccessful attempts Attempt 1          ZACKERY OZUNA - Licensed Nurse

## 2022-06-16 ENCOUNTER — READMISSION MANAGEMENT (OUTPATIENT)
Dept: CALL CENTER | Facility: HOSPITAL | Age: 47
End: 2022-06-16

## 2022-06-16 ENCOUNTER — TELEPHONE (OUTPATIENT)
Dept: PAIN MEDICINE | Facility: CLINIC | Age: 47
End: 2022-06-16

## 2022-06-16 DIAGNOSIS — G90.522 COMPLEX REGIONAL PAIN SYNDROME TYPE 1 OF LEFT LOWER EXTREMITY: ICD-10-CM

## 2022-06-16 RX ORDER — OXYCODONE AND ACETAMINOPHEN 10; 325 MG/1; MG/1
1 TABLET ORAL EVERY 8 HOURS PRN
Qty: 90 TABLET | Refills: 0 | Status: SHIPPED | OUTPATIENT
Start: 2022-06-16 | End: 2022-07-22 | Stop reason: SDUPTHER

## 2022-06-16 NOTE — TELEPHONE ENCOUNTER
Caller: Nieves Mc A    Relationship to patient: Self    Best call back number:     Patient is needing: PATIENTS MOTHER AND PATIENT CALLED BACK SAYING THEY HAVE NO MEDICINE AND NEED TO SPEAK TO SOMEONE.  UNABLE TO WARM TRANSFER

## 2022-06-16 NOTE — TELEPHONE ENCOUNTER
Patient called requesting refill on her oxycodone has appt. Scheduled had previously been in the hospital.; Inspect in chart.

## 2022-06-16 NOTE — OUTREACH NOTE
Medical Week 1 Survey    Flowsheet Row Responses   [facility] patient discharged from? [facility]   Does the patient have one of the following disease processes/diagnoses(primary or secondary)? Other   Week 1 attempt successful? No   Unsuccessful attempts Attempt 2   Discharge diagnosis chronic myelocytic leukemia          TRU JALLOH - Registered Nurse

## 2022-06-24 ENCOUNTER — TELEPHONE (OUTPATIENT)
Dept: ONCOLOGY | Facility: CLINIC | Age: 47
End: 2022-06-24

## 2022-06-24 DIAGNOSIS — C92.10 CML (CHRONIC MYELOCYTIC LEUKEMIA): ICD-10-CM

## 2022-06-24 DIAGNOSIS — C95.90 LEUKEMIA NOT HAVING ACHIEVED REMISSION, UNSPECIFIED LEUKEMIA TYPE: ICD-10-CM

## 2022-06-24 RX ORDER — ALPRAZOLAM 0.25 MG/1
0.25 TABLET ORAL NIGHTLY PRN
Qty: 30 TABLET | Refills: 0 | Status: SHIPPED | OUTPATIENT
Start: 2022-06-24 | End: 2022-08-11 | Stop reason: SDUPTHER

## 2022-06-24 NOTE — TELEPHONE ENCOUNTER
Caller: Sayra Cabezas    Relationship: Mother    Best call back number: [phone]    Requested Prescriptions:   Requested Prescriptions     Pending Prescriptions Disp Refills   • ALPRAZolam (Xanax) 0.25 MG tablet 30 tablet 0     Sig: Take 1 tablet by mouth At Night As Needed for Anxiety.        Pharmacy where request should be sent: [id number] - [facility] [place] - [address] [place] [address] [phone] [facility] [phone] FX     Additional details provided by patient: PT IS OUT OF THE MEDICATION - MOTHER WILL BE AT PHARMACY IN ABOUT 45 MINS AND WOULD LIKE THIS CALLED IN ASAP.     Does the patient have less than a 3 day supply:  [x] Yes  [] No

## 2022-06-24 NOTE — TELEPHONE ENCOUNTER
Caller: Nieves Mc    Relationship: Self    Best call back number: [phone]    What is the best time to reach you: ANYTIME     Who are you requesting to speak with (clinical staff, provider,  specific staff member): DR SCOTT OR HER NURSE      What was the call regarding:     WANTING TO REQUEST REFILL TO BE SENT IN FOR HOPES XANAX,      SHE IS OUT OF THEM,   AND THIS PAST WEEK     SHE HAS BEEN SICK AND PUKING  AND CANNOT HOLD ANYTHING DOWN AND STATED HER RIGHT ARM IS HURTING     IS SLEEPING A LOT AND NOT EATING MUCH     NOTHING NOTICE MAKING BETTER OR WORSE     HAS TAKEN SOME MEDICATION FOR NAUSEA BUT THAT IS ALL    DID TEST AT HOME KIT FOR COVID YESTERDAY IS NEGATIVE        PREFERRED PHARMACY :     [id number] - [place], [place] - [address] - [phone]  - [phone] FX  [phone]    PREVIOUS PHARMACY CLOSED WAS    [id number] - Closed - [place], [place] - [address] [facility] [address] - [phone]  - [phone] (Ph: [phone])      Do you require a callback: YES TO DISCUSS

## 2022-06-24 NOTE — TELEPHONE ENCOUNTER
Attempted to call pt to discuss. Number provided has been disconnected. Attempted to call the other number on her profile, a male answered and said that I had the wrong number.

## 2022-06-27 ENCOUNTER — TELEPHONE (OUTPATIENT)
Dept: DIABETES SERVICES | Facility: HOSPITAL | Age: 47
End: 2022-06-27

## 2022-06-27 NOTE — TELEPHONE ENCOUNTER
"Contacted mother. She states she has been checking pt's bs 3 times/day and has been giving insulin as prescibed. Asked mother what insulin she has been giving pt. She states she is unsure of name of insulin because she is not home at this time but it was the insulin pt had at home prior to adm. Discussed rx for Soliqua and Lispro at [facility] pharmacy and pt should be taking. Mother states, \"No one told me her insulin had changed.\" Discussed educator reviewed the insulins prior to d/c and mother stated she would  from pharmacy. Rxs were sent for the One Touch Verio test strips and One Touch Delica Plus lancets. Mother states she has not picked these up yet. She states she is using meter and supplies she had at home since they ran out of test strips and lancets for the One Touch Verio. Explained the pharmacy does have rx for both and encouraged mother to . Encouraged mother to review discharge instructions and to be administering the Soliqua and Lispro insulin as rxd. Mother states will check with pharmacy tomorrow. She has no additional questions.   "

## 2022-06-28 ENCOUNTER — READMISSION MANAGEMENT (OUTPATIENT)
Dept: CALL CENTER | Facility: HOSPITAL | Age: 47
End: 2022-06-28

## 2022-06-28 DIAGNOSIS — C92.10 CML (CHRONIC MYELOCYTIC LEUKEMIA): ICD-10-CM

## 2022-06-28 DIAGNOSIS — C95.90 LEUKEMIA NOT HAVING ACHIEVED REMISSION, UNSPECIFIED LEUKEMIA TYPE: ICD-10-CM

## 2022-06-28 RX ORDER — ALPRAZOLAM 0.25 MG/1
0.25 TABLET ORAL NIGHTLY PRN
Qty: 30 TABLET | Refills: 0 | OUTPATIENT
Start: 2022-06-28

## 2022-06-28 NOTE — OUTREACH NOTE
Medical Week 3 Survey    Flowsheet Row Responses   [facility] patient discharged from? [facility]   Does the patient have one of the following disease processes/diagnoses(primary or secondary)? Other   Week 3 attempt successful? Yes   Call start time 1539   Call end time 1547   Discharge diagnosis chronic myelocytic leukemia, atypical pneumonia   Is patient permission given to speak with other caregiver? Yes   List who call center can speak with Sayra Cabezas    Person spoke with today (if not patient) and relationship Sayra Cabezas Mother    Meds reviewed with patient/caregiver? Yes   Does the patient have all medications ordered at discharge? Yes   Is the patient taking all medications as directed (includes completed medication regime)? Yes   Medication comments Mother reports patient taking her insulin but is not checking blood sugar right now because they don't have strips.    Does the patient have a primary care provider?  Yes   Comments regarding PCP Houston Oro MD PCP. Reports that patient has not seen PCP since discharge.    Does the patient have an appointment with their PCP within 7 days of discharge? No   Nursing Interventions Educated patient on importance of making appointment, Advised patient to make appointment   Has the patient kept scheduled appointments due by today? N/A   Comments Reports patient seeing oncology Dr tomorrow.    Has home health visited the patient within 72 hours of discharge? N/A   Psychosocial issues? No   Did the patient receive a copy of their discharge instructions? Yes   Nursing interventions Reviewed instructions with patient   What is the patient's perception of their health status since discharge? Worsening  [Mother reports patient feels like she has pneumonia again but does not want to go to the ER. Encouraged to see physician asap. Mother reports patient seeing oncologist tomorrow and will be checked then. ]   Is the patient/caregiver able to  teach back signs and symptoms related to disease process for when to call PCP? No   Is the patient/caregiver able to teach back signs and symptoms related to disease process for when to call 911? Yes   Is the patient/caregiver able to teach back the hierarchy of who to call/visit for symptoms/problems? PCP, Specialist, Home health nurse, Urgent Care, ED, 911 No  [Patient not in agreement to be seen by PCP or ER for symptoms of pneumonia, wants to just wait for appt with oncology specialist tomorrow. ]   If the patient is a current smoker, are they able to teach back resources for cessation? Not a smoker   Week 3 Call Completed? Yes          LISETTE BAIRD - Registered Nurse

## 2022-06-29 ENCOUNTER — APPOINTMENT (OUTPATIENT)
Dept: CT IMAGING | Facility: HOSPITAL | Age: 47
End: 2022-06-29

## 2022-06-29 ENCOUNTER — APPOINTMENT (OUTPATIENT)
Dept: GENERAL RADIOLOGY | Facility: HOSPITAL | Age: 47
End: 2022-06-29

## 2022-06-29 ENCOUNTER — HOSPITAL ENCOUNTER (INPATIENT)
Facility: HOSPITAL | Age: 47
LOS: 10 days | Discharge: HOME | End: 2022-07-09
Attending: EMERGENCY MEDICINE | Admitting: INTERNAL MEDICINE

## 2022-06-29 DIAGNOSIS — C92.10 CML (CHRONIC MYELOCYTIC LEUKEMIA): Primary | ICD-10-CM

## 2022-06-29 DIAGNOSIS — J18.9 PNEUMONIA DUE TO INFECTIOUS ORGANISM, UNSPECIFIED LATERALITY, UNSPECIFIED PART OF LUNG: ICD-10-CM

## 2022-06-29 DIAGNOSIS — I50.9 CONGESTIVE HEART FAILURE, UNSPECIFIED HF CHRONICITY, UNSPECIFIED HEART FAILURE TYPE: ICD-10-CM

## 2022-06-29 LAB
ALBUMIN SERPL-MCNC: 3.5 G/DL (ref 3.5–5.2)
ALBUMIN/GLOB SERPL: 0.9 G/DL
ALP SERPL-CCNC: 985 U/L (ref 39–117)
ALT SERPL W P-5'-P-CCNC: 14 U/L (ref 1–33)
ANION GAP SERPL CALCULATED.3IONS-SCNC: 14 MMOL/L (ref 5–15)
ANISOCYTOSIS BLD QL: ABNORMAL
APTT PPP: 35.7 SECONDS (ref 61–76.5)
ARTERIAL PATENCY WRIST A: POSITIVE
AST SERPL-CCNC: 22 U/L (ref 1–32)
ATMOSPHERIC PRESS: ABNORMAL MM[HG]
B-HCG UR QL: NEGATIVE
BACTERIA UR QL AUTO: ABNORMAL /HPF
BASE EXCESS BLDA CALC-SCNC: 0.4 MMOL/L (ref 0–3)
BASOPHILS # BLD MANUAL: 30.15 10*3/MM3 (ref 0–0.2)
BASOPHILS NFR BLD MANUAL: 15 % (ref 0–1.5)
BDY SITE: ABNORMAL
BILIRUB SERPL-MCNC: 1.4 MG/DL (ref 0–1.2)
BILIRUB UR QL STRIP: NEGATIVE
BLASTS NFR BLD MANUAL: 27 % (ref 0–0)
BUN SERPL-MCNC: 8 MG/DL (ref 6–20)
BUN/CREAT SERPL: 13.3 (ref 7–25)
CALCIUM SPEC-SCNC: 8.8 MG/DL (ref 8.6–10.5)
CHLORIDE SERPL-SCNC: 97 MMOL/L (ref 98–107)
CLARITY UR: CLEAR
CO2 BLDA-SCNC: 23.7 MMOL/L (ref 22–29)
CO2 SERPL-SCNC: 23 MMOL/L (ref 22–29)
COLOR UR: YELLOW
CREAT SERPL-MCNC: 0.6 MG/DL (ref 0.57–1)
D DIMER PPP FEU-MCNC: 1.85 MG/L (FEU) (ref 0–0.59)
D-LACTATE SERPL-SCNC: 0.5 MMOL/L (ref 0.5–2)
DEPRECATED RDW RBC AUTO: 62.1 FL (ref 37–54)
EGFRCR SERPLBLD CKD-EPI 2021: 112.3 ML/MIN/1.73
EOSINOPHIL # BLD MANUAL: 16.08 10*3/MM3 (ref 0–0.4)
EOSINOPHIL NFR BLD MANUAL: 8 % (ref 0.3–6.2)
ERYTHROCYTE [DISTWIDTH] IN BLOOD BY AUTOMATED COUNT: 22.6 % (ref 12.3–15.4)
GLOBULIN UR ELPH-MCNC: 4.1 GM/DL
GLUCOSE BLDC GLUCOMTR-MCNC: 308 MG/DL (ref 70–105)
GLUCOSE SERPL-MCNC: 275 MG/DL (ref 65–99)
GLUCOSE UR STRIP-MCNC: NEGATIVE MG/DL
HCG SERPL QL: NEGATIVE
HCO3 BLDA-SCNC: 22.8 MMOL/L (ref 21–28)
HCT VFR BLD AUTO: 25.7 % (ref 34–46.6)
HEMODILUTION: NO
HGB BLD-MCNC: 8.5 G/DL (ref 12–15.9)
HGB UR QL STRIP.AUTO: NEGATIVE
HOLD SPECIMEN: NORMAL
HYALINE CASTS UR QL AUTO: ABNORMAL /LPF
INHALED O2 CONCENTRATION: 33 %
INR PPP: 1.22 (ref 0.93–1.1)
KETONES UR QL STRIP: NEGATIVE
LARGE PLATELETS: ABNORMAL
LEUKOCYTE ESTERASE UR QL STRIP.AUTO: NEGATIVE
LIPASE SERPL-CCNC: 10 U/L (ref 13–60)
LYMPHOCYTES # BLD MANUAL: 6.03 10*3/MM3 (ref 0.7–3.1)
LYMPHOCYTES NFR BLD MANUAL: 7 % (ref 5–12)
MCH RBC QN AUTO: 26.6 PG (ref 26.6–33)
MCHC RBC AUTO-ENTMCNC: 33.2 G/DL (ref 31.5–35.7)
MCV RBC AUTO: 80.1 FL (ref 79–97)
METAMYELOCYTES NFR BLD MANUAL: 4 % (ref 0–0)
MODALITY: ABNORMAL
MONOCYTES # BLD: 14.07 10*3/MM3 (ref 0.1–0.9)
MYELOCYTES NFR BLD MANUAL: 1 % (ref 0–0)
NEUTROPHILS # BLD AUTO: 70.35 10*3/MM3 (ref 1.7–7)
NEUTROPHILS NFR BLD MANUAL: 27 % (ref 42.7–76)
NEUTS BAND NFR BLD MANUAL: 8 % (ref 0–5)
NITRITE UR QL STRIP: NEGATIVE
NRBC SPEC MANUAL: 1 /100 WBC (ref 0–0.2)
NT-PROBNP SERPL-MCNC: ABNORMAL PG/ML (ref 0–450)
PCO2 BLDA: 29.1 MM HG (ref 35–48)
PH BLDA: 7.5 PH UNITS (ref 7.35–7.45)
PH UR STRIP.AUTO: 7.5 [PH] (ref 5–8)
PLATELET # BLD AUTO: 293 10*3/MM3 (ref 140–450)
PMV BLD AUTO: 7.4 FL (ref 6–12)
PO2 BLDA: 57.1 MM HG (ref 83–108)
POLYCHROMASIA BLD QL SMEAR: ABNORMAL
POTASSIUM SERPL-SCNC: 3.9 MMOL/L (ref 3.5–5.2)
PROT SERPL-MCNC: 7.6 G/DL (ref 6–8.5)
PROT UR QL STRIP: ABNORMAL
PROTHROMBIN TIME: 12.4 SECONDS (ref 9.6–11.7)
RBC # BLD AUTO: 3.21 10*6/MM3 (ref 3.77–5.28)
RBC # UR STRIP: ABNORMAL /HPF
REF LAB TEST METHOD: ABNORMAL
SAO2 % BLDCOA: 92.2 % (ref 94–98)
SARS-COV-2 RNA RESP QL NAA+PROBE: NOT DETECTED
SCAN SLIDE: NORMAL
SODIUM SERPL-SCNC: 134 MMOL/L (ref 136–145)
SP GR UR STRIP: 1.01 (ref 1–1.03)
SQUAMOUS #/AREA URNS HPF: ABNORMAL /HPF
TROPONIN T SERPL-MCNC: <0.01 NG/ML (ref 0–0.03)
UROBILINOGEN UR QL STRIP: ABNORMAL
VARIANT LYMPHS NFR BLD MANUAL: 3 % (ref 19.6–45.3)
WBC # UR STRIP: ABNORMAL /HPF
WBC MORPH BLD: NORMAL
WBC NRBC COR # BLD: 201 10*3/MM3 (ref 3.4–10.8)
WHOLE BLOOD HOLD COAG: NORMAL
WHOLE BLOOD HOLD SPECIMEN: NORMAL

## 2022-06-29 PROCEDURE — 71275 CT ANGIOGRAPHY CHEST: CPT

## 2022-06-29 PROCEDURE — 93005 ELECTROCARDIOGRAM TRACING: CPT | Performed by: EMERGENCY MEDICINE

## 2022-06-29 PROCEDURE — 82803 BLOOD GASES ANY COMBINATION: CPT

## 2022-06-29 PROCEDURE — 25010000002 CEFEPIME PER 500 MG: Performed by: EMERGENCY MEDICINE

## 2022-06-29 PROCEDURE — 82962 GLUCOSE BLOOD TEST: CPT

## 2022-06-29 PROCEDURE — U0005 INFEC AGEN DETEC AMPLI PROBE: HCPCS | Performed by: EMERGENCY MEDICINE

## 2022-06-29 PROCEDURE — 36600 WITHDRAWAL OF ARTERIAL BLOOD: CPT

## 2022-06-29 PROCEDURE — 25010000002 VANCOMYCIN 1 G RECONSTITUTED SOLUTION 1 EACH VIAL: Performed by: EMERGENCY MEDICINE

## 2022-06-29 PROCEDURE — 85379 FIBRIN DEGRADATION QUANT: CPT | Performed by: EMERGENCY MEDICINE

## 2022-06-29 PROCEDURE — 0 IOPAMIDOL PER 1 ML: Performed by: EMERGENCY MEDICINE

## 2022-06-29 PROCEDURE — U0003 INFECTIOUS AGENT DETECTION BY NUCLEIC ACID (DNA OR RNA); SEVERE ACUTE RESPIRATORY SYNDROME CORONAVIRUS 2 (SARS-COV-2) (CORONAVIRUS DISEASE [COVID-19]), AMPLIFIED PROBE TECHNIQUE, MAKING USE OF HIGH THROUGHPUT TECHNOLOGIES AS DESCRIBED BY CMS-2020-01-R: HCPCS | Performed by: EMERGENCY MEDICINE

## 2022-06-29 PROCEDURE — 81001 URINALYSIS AUTO W/SCOPE: CPT | Performed by: EMERGENCY MEDICINE

## 2022-06-29 PROCEDURE — 85610 PROTHROMBIN TIME: CPT | Performed by: EMERGENCY MEDICINE

## 2022-06-29 PROCEDURE — 71045 X-RAY EXAM CHEST 1 VIEW: CPT

## 2022-06-29 PROCEDURE — 85025 COMPLETE CBC W/AUTO DIFF WBC: CPT | Performed by: EMERGENCY MEDICINE

## 2022-06-29 PROCEDURE — 25010000002 FUROSEMIDE PER 20 MG: Performed by: EMERGENCY MEDICINE

## 2022-06-29 PROCEDURE — 81025 URINE PREGNANCY TEST: CPT | Performed by: EMERGENCY MEDICINE

## 2022-06-29 PROCEDURE — 87040 BLOOD CULTURE FOR BACTERIA: CPT | Performed by: EMERGENCY MEDICINE

## 2022-06-29 PROCEDURE — 85730 THROMBOPLASTIN TIME PARTIAL: CPT | Performed by: EMERGENCY MEDICINE

## 2022-06-29 PROCEDURE — 36415 COLL VENOUS BLD VENIPUNCTURE: CPT | Performed by: HOSPITALIST

## 2022-06-29 PROCEDURE — 85025 COMPLETE CBC W/AUTO DIFF WBC: CPT | Performed by: HOSPITALIST

## 2022-06-29 PROCEDURE — 80053 COMPREHEN METABOLIC PANEL: CPT | Performed by: EMERGENCY MEDICINE

## 2022-06-29 PROCEDURE — 84484 ASSAY OF TROPONIN QUANT: CPT | Performed by: EMERGENCY MEDICINE

## 2022-06-29 PROCEDURE — 83880 ASSAY OF NATRIURETIC PEPTIDE: CPT | Performed by: EMERGENCY MEDICINE

## 2022-06-29 PROCEDURE — 85007 BL SMEAR W/DIFF WBC COUNT: CPT | Performed by: EMERGENCY MEDICINE

## 2022-06-29 PROCEDURE — 83690 ASSAY OF LIPASE: CPT | Performed by: EMERGENCY MEDICINE

## 2022-06-29 PROCEDURE — 99285 EMERGENCY DEPT VISIT HI MDM: CPT

## 2022-06-29 PROCEDURE — 99284 EMERGENCY DEPT VISIT MOD MDM: CPT

## 2022-06-29 PROCEDURE — 83036 HEMOGLOBIN GLYCOSYLATED A1C: CPT | Performed by: HOSPITALIST

## 2022-06-29 PROCEDURE — 85007 BL SMEAR W/DIFF WBC COUNT: CPT | Performed by: HOSPITALIST

## 2022-06-29 PROCEDURE — 83605 ASSAY OF LACTIC ACID: CPT

## 2022-06-29 PROCEDURE — 70450 CT HEAD/BRAIN W/O DYE: CPT

## 2022-06-29 PROCEDURE — 84703 CHORIONIC GONADOTROPIN ASSAY: CPT | Performed by: EMERGENCY MEDICINE

## 2022-06-29 PROCEDURE — 82550 ASSAY OF CK (CPK): CPT | Performed by: HOSPITALIST

## 2022-06-29 RX ORDER — ALUMINA, MAGNESIA, AND SIMETHICONE 2400; 2400; 240 MG/30ML; MG/30ML; MG/30ML
15 SUSPENSION ORAL EVERY 6 HOURS PRN
Status: DISCONTINUED | OUTPATIENT
Start: 2022-06-29 | End: 2022-07-09 | Stop reason: HOSPADM

## 2022-06-29 RX ORDER — CHOLECALCIFEROL (VITAMIN D3) 125 MCG
5 CAPSULE ORAL NIGHTLY PRN
Status: DISCONTINUED | OUTPATIENT
Start: 2022-06-29 | End: 2022-07-09 | Stop reason: HOSPADM

## 2022-06-29 RX ORDER — SODIUM CHLORIDE 0.9 % (FLUSH) 0.9 %
10 SYRINGE (ML) INJECTION AS NEEDED
Status: DISCONTINUED | OUTPATIENT
Start: 2022-06-29 | End: 2022-07-09 | Stop reason: HOSPADM

## 2022-06-29 RX ORDER — METOPROLOL SUCCINATE 25 MG/1
25 TABLET, EXTENDED RELEASE ORAL DAILY
Status: DISCONTINUED | OUTPATIENT
Start: 2022-06-30 | End: 2022-06-30

## 2022-06-29 RX ORDER — FUROSEMIDE 10 MG/ML
40 INJECTION INTRAMUSCULAR; INTRAVENOUS ONCE
Status: COMPLETED | OUTPATIENT
Start: 2022-06-29 | End: 2022-06-29

## 2022-06-29 RX ORDER — NICOTINE POLACRILEX 4 MG
15 LOZENGE BUCCAL
Status: DISCONTINUED | OUTPATIENT
Start: 2022-06-29 | End: 2022-07-03

## 2022-06-29 RX ORDER — GABAPENTIN 300 MG/1
300 CAPSULE ORAL 3 TIMES DAILY
Status: DISCONTINUED | OUTPATIENT
Start: 2022-06-29 | End: 2022-07-09 | Stop reason: HOSPADM

## 2022-06-29 RX ORDER — ACETAMINOPHEN 325 MG/1
650 TABLET ORAL EVERY 4 HOURS PRN
Status: DISCONTINUED | OUTPATIENT
Start: 2022-06-29 | End: 2022-07-09 | Stop reason: HOSPADM

## 2022-06-29 RX ORDER — ACETAMINOPHEN 650 MG/1
650 SUPPOSITORY RECTAL EVERY 4 HOURS PRN
Status: DISCONTINUED | OUTPATIENT
Start: 2022-06-29 | End: 2022-07-09 | Stop reason: HOSPADM

## 2022-06-29 RX ORDER — HYDROCODONE BITARTRATE AND ACETAMINOPHEN 7.5; 325 MG/1; MG/1
1 TABLET ORAL ONCE
Status: COMPLETED | OUTPATIENT
Start: 2022-06-29 | End: 2022-06-29

## 2022-06-29 RX ORDER — DEXTROSE MONOHYDRATE 25 G/50ML
25 INJECTION, SOLUTION INTRAVENOUS
Status: DISCONTINUED | OUTPATIENT
Start: 2022-06-29 | End: 2022-07-03

## 2022-06-29 RX ORDER — INSULIN LISPRO 100 [IU]/ML
0-9 INJECTION, SOLUTION INTRAVENOUS; SUBCUTANEOUS
Status: DISCONTINUED | OUTPATIENT
Start: 2022-06-29 | End: 2022-07-03

## 2022-06-29 RX ORDER — OLANZAPINE 10 MG/2ML
1 INJECTION, POWDER, LYOPHILIZED, FOR SOLUTION INTRAMUSCULAR AS NEEDED
Status: DISCONTINUED | OUTPATIENT
Start: 2022-06-29 | End: 2022-07-06

## 2022-06-29 RX ORDER — ACETAZOLAMIDE 250 MG/1
250 TABLET ORAL DAILY
Status: DISCONTINUED | OUTPATIENT
Start: 2022-06-30 | End: 2022-07-01

## 2022-06-29 RX ORDER — HYDROXYUREA 500 MG/1
1000 CAPSULE ORAL 2 TIMES DAILY
Status: DISCONTINUED | OUTPATIENT
Start: 2022-06-29 | End: 2022-07-05

## 2022-06-29 RX ORDER — ONDANSETRON 4 MG/1
4 TABLET, FILM COATED ORAL EVERY 6 HOURS PRN
Status: DISCONTINUED | OUTPATIENT
Start: 2022-06-29 | End: 2022-07-09 | Stop reason: HOSPADM

## 2022-06-29 RX ORDER — CITALOPRAM 20 MG/1
10 TABLET ORAL DAILY
Status: DISCONTINUED | OUTPATIENT
Start: 2022-06-30 | End: 2022-07-09 | Stop reason: HOSPADM

## 2022-06-29 RX ORDER — SODIUM CHLORIDE 0.9 % (FLUSH) 0.9 %
10 SYRINGE (ML) INJECTION EVERY 12 HOURS SCHEDULED
Status: DISCONTINUED | OUTPATIENT
Start: 2022-06-29 | End: 2022-07-09 | Stop reason: HOSPADM

## 2022-06-29 RX ORDER — OXYCODONE HYDROCHLORIDE 5 MG/1
10 TABLET ORAL 3 TIMES DAILY PRN
Refills: 0 | Status: DISCONTINUED | OUTPATIENT
Start: 2022-06-29 | End: 2022-06-30 | Stop reason: SDUPTHER

## 2022-06-29 RX ORDER — ACETAMINOPHEN 160 MG/5ML
650 SOLUTION ORAL EVERY 4 HOURS PRN
Status: DISCONTINUED | OUTPATIENT
Start: 2022-06-29 | End: 2022-07-09 | Stop reason: HOSPADM

## 2022-06-29 RX ORDER — INSULIN LISPRO 100 [IU]/ML
0-9 INJECTION, SOLUTION INTRAVENOUS; SUBCUTANEOUS AS NEEDED
Status: DISCONTINUED | OUTPATIENT
Start: 2022-06-29 | End: 2022-07-03

## 2022-06-29 RX ORDER — ALPRAZOLAM 0.25 MG/1
0.25 TABLET ORAL NIGHTLY PRN
Status: DISCONTINUED | OUTPATIENT
Start: 2022-06-29 | End: 2022-07-09 | Stop reason: HOSPADM

## 2022-06-29 RX ORDER — ONDANSETRON 2 MG/ML
4 INJECTION INTRAMUSCULAR; INTRAVENOUS EVERY 6 HOURS PRN
Status: DISCONTINUED | OUTPATIENT
Start: 2022-06-29 | End: 2022-07-09 | Stop reason: HOSPADM

## 2022-06-29 RX ORDER — ENOXAPARIN SODIUM 100 MG/ML
40 INJECTION SUBCUTANEOUS
Status: DISCONTINUED | OUTPATIENT
Start: 2022-06-29 | End: 2022-06-29

## 2022-06-29 RX ADMIN — IOPAMIDOL 100 ML: 755 INJECTION, SOLUTION INTRAVENOUS at 18:38

## 2022-06-29 RX ADMIN — VANCOMYCIN HYDROCHLORIDE 1000 MG: 1 INJECTION, POWDER, LYOPHILIZED, FOR SOLUTION INTRAVENOUS at 17:49

## 2022-06-29 RX ADMIN — CEFEPIME HYDROCHLORIDE 2 G: 2 INJECTION, POWDER, FOR SOLUTION INTRAVENOUS at 16:34

## 2022-06-29 RX ADMIN — FUROSEMIDE 40 MG: 10 INJECTION, SOLUTION INTRAMUSCULAR; INTRAVENOUS at 20:37

## 2022-06-29 RX ADMIN — HYDROCODONE BITARTRATE AND ACETAMINOPHEN 1 TABLET: 7.5; 325 TABLET ORAL at 16:45

## 2022-06-30 ENCOUNTER — PREP FOR SURGERY (OUTPATIENT)
Dept: OTHER | Facility: HOSPITAL | Age: 47
End: 2022-06-30

## 2022-06-30 ENCOUNTER — APPOINTMENT (OUTPATIENT)
Dept: GENERAL RADIOLOGY | Facility: HOSPITAL | Age: 47
End: 2022-06-30

## 2022-06-30 ENCOUNTER — READMISSION MANAGEMENT (OUTPATIENT)
Dept: CALL CENTER | Facility: HOSPITAL | Age: 47
End: 2022-06-30

## 2022-06-30 ENCOUNTER — TELEPHONE (OUTPATIENT)
Dept: ONCOLOGY | Facility: CLINIC | Age: 47
End: 2022-06-30

## 2022-06-30 ENCOUNTER — APPOINTMENT (OUTPATIENT)
Dept: CARDIOLOGY | Facility: HOSPITAL | Age: 47
End: 2022-06-30

## 2022-06-30 ENCOUNTER — APPOINTMENT (OUTPATIENT)
Dept: LAB | Facility: HOSPITAL | Age: 47
End: 2022-06-30

## 2022-06-30 PROBLEM — I50.21 ACUTE SYSTOLIC CHF (CONGESTIVE HEART FAILURE): Status: ACTIVE | Noted: 2022-06-30

## 2022-06-30 LAB
ANION GAP SERPL CALCULATED.3IONS-SCNC: 15 MMOL/L (ref 5–15)
ANISOCYTOSIS BLD QL: ABNORMAL
BASOPHILS # BLD MANUAL: 12.35 10*3/MM3 (ref 0–0.2)
BASOPHILS NFR BLD MANUAL: 6 % (ref 0–1.5)
BH CV ECHO MEAS - ACS: 1.89 CM
BH CV ECHO MEAS - AO MAX PG: 8.7 MMHG
BH CV ECHO MEAS - AO MEAN PG: 5.1 MMHG
BH CV ECHO MEAS - AO ROOT DIAM: 2.8 CM
BH CV ECHO MEAS - AO V2 MAX: 147.3 CM/SEC
BH CV ECHO MEAS - AO V2 VTI: 28.5 CM
BH CV ECHO MEAS - AVA(I,D): 1.71 CM2
BH CV ECHO MEAS - EDV(CUBED): 127.1 ML
BH CV ECHO MEAS - EDV(MOD-SP4): 86.7 ML
BH CV ECHO MEAS - EF(MOD-BP): 45 %
BH CV ECHO MEAS - EF(MOD-SP4): 45.1 %
BH CV ECHO MEAS - ESV(CUBED): 61 ML
BH CV ECHO MEAS - ESV(MOD-SP4): 47.6 ML
BH CV ECHO MEAS - FS: 21.7 %
BH CV ECHO MEAS - IVS/LVPW: 0.81 CM
BH CV ECHO MEAS - IVSD: 0.93 CM
BH CV ECHO MEAS - LA DIMENSION: 3.9 CM
BH CV ECHO MEAS - LV DIASTOLIC VOL/BSA (35-75): 54.3 CM2
BH CV ECHO MEAS - LV MASS(C)D: 194.2 GRAMS
BH CV ECHO MEAS - LV MAX PG: 2.7 MMHG
BH CV ECHO MEAS - LV MEAN PG: 1.53 MMHG
BH CV ECHO MEAS - LV SYSTOLIC VOL/BSA (12-30): 29.8 CM2
BH CV ECHO MEAS - LV V1 MAX: 81.9 CM/SEC
BH CV ECHO MEAS - LV V1 VTI: 15.1 CM
BH CV ECHO MEAS - LVIDD: 5 CM
BH CV ECHO MEAS - LVIDS: 3.9 CM
BH CV ECHO MEAS - LVOT AREA: 3.2 CM2
BH CV ECHO MEAS - LVOT DIAM: 2.03 CM
BH CV ECHO MEAS - LVPWD: 1.15 CM
BH CV ECHO MEAS - MV A MAX VEL: 36.2 CM/SEC
BH CV ECHO MEAS - MV DEC SLOPE: 1668 CM/SEC2
BH CV ECHO MEAS - MV DEC TIME: 0.07 MSEC
BH CV ECHO MEAS - MV E MAX VEL: 111.2 CM/SEC
BH CV ECHO MEAS - MV E/A: 3.1
BH CV ECHO MEAS - MV MAX PG: 4.2 MMHG
BH CV ECHO MEAS - MV MEAN PG: 2.6 MMHG
BH CV ECHO MEAS - MV V2 VTI: 15.7 CM
BH CV ECHO MEAS - MVA(VTI): 3.1 CM2
BH CV ECHO MEAS - PA ACC TIME: 0.09 SEC
BH CV ECHO MEAS - PA PR(ACCEL): 40.1 MMHG
BH CV ECHO MEAS - PA V2 MAX: 101.6 CM/SEC
BH CV ECHO MEAS - PULM A REVS DUR: 0.08 SEC
BH CV ECHO MEAS - PULM A REVS VEL: 30.5 CM/SEC
BH CV ECHO MEAS - PULM DIAS VEL: 42.2 CM/SEC
BH CV ECHO MEAS - PULM S/D: 1.18
BH CV ECHO MEAS - PULM SYS VEL: 49.7 CM/SEC
BH CV ECHO MEAS - RAP SYSTOLE: 3 MMHG
BH CV ECHO MEAS - RV MAX PG: 1.96 MMHG
BH CV ECHO MEAS - RV V1 MAX: 70 CM/SEC
BH CV ECHO MEAS - RV V1 VTI: 11.5 CM
BH CV ECHO MEAS - RVDD: 2.6 CM
BH CV ECHO MEAS - SI(MOD-SP4): 24.5 ML/M2
BH CV ECHO MEAS - SV(LVOT): 48.6 ML
BH CV ECHO MEAS - SV(MOD-SP4): 39.1 ML
BLASTS NFR BLD MANUAL: 17 % (ref 0–0)
BUN SERPL-MCNC: 10 MG/DL (ref 6–20)
BUN/CREAT SERPL: 15.4 (ref 7–25)
CALCIUM SPEC-SCNC: 9 MG/DL (ref 8.6–10.5)
CHLORIDE SERPL-SCNC: 94 MMOL/L (ref 98–107)
CK SERPL-CCNC: 18 U/L (ref 20–180)
CO2 SERPL-SCNC: 24 MMOL/L (ref 22–29)
CREAT SERPL-MCNC: 0.65 MG/DL (ref 0.57–1)
DEPRECATED RDW RBC AUTO: 61.7 FL (ref 37–54)
EGFRCR SERPLBLD CKD-EPI 2021: 110.1 ML/MIN/1.73
EOSINOPHIL # BLD MANUAL: 14.41 10*3/MM3 (ref 0–0.4)
EOSINOPHIL NFR BLD MANUAL: 7 % (ref 0.3–6.2)
ERYTHROCYTE [DISTWIDTH] IN BLOOD BY AUTOMATED COUNT: 22.1 % (ref 12.3–15.4)
FUNGUS WND CULT: ABNORMAL
GLUCOSE BLDC GLUCOMTR-MCNC: 192 MG/DL (ref 70–105)
GLUCOSE BLDC GLUCOMTR-MCNC: 195 MG/DL (ref 70–105)
GLUCOSE BLDC GLUCOMTR-MCNC: 277 MG/DL (ref 70–105)
GLUCOSE SERPL-MCNC: 325 MG/DL (ref 65–99)
HBA1C MFR BLD: 8.4 % (ref 3.5–5.6)
HCT VFR BLD AUTO: 25.7 % (ref 34–46.6)
HGB BLD-MCNC: 8 G/DL (ref 12–15.9)
LYMPHOCYTES # BLD MANUAL: 20.59 10*3/MM3 (ref 0.7–3.1)
LYMPHOCYTES NFR BLD MANUAL: 4 % (ref 5–12)
MAXIMAL PREDICTED HEART RATE: 174 BPM
MCH RBC QN AUTO: 25 PG (ref 26.6–33)
MCHC RBC AUTO-ENTMCNC: 31 G/DL (ref 31.5–35.7)
MCV RBC AUTO: 80.6 FL (ref 79–97)
METAMYELOCYTES NFR BLD MANUAL: 5 % (ref 0–0)
MONOCYTES # BLD: 8.24 10*3/MM3 (ref 0.1–0.9)
MYELOCYTES NFR BLD MANUAL: 4 % (ref 0–0)
NEUTROPHILS # BLD AUTO: 94.71 10*3/MM3 (ref 1.7–7)
NEUTROPHILS NFR BLD MANUAL: 31 % (ref 42.7–76)
NEUTS BAND NFR BLD MANUAL: 15 % (ref 0–5)
NRBC SPEC MANUAL: 5 /100 WBC (ref 0–0.2)
PLAT MORPH BLD: NORMAL
PLATELET # BLD AUTO: 291 10*3/MM3 (ref 140–450)
PMV BLD AUTO: 7.7 FL (ref 6–12)
POLYCHROMASIA BLD QL SMEAR: ABNORMAL
POTASSIUM SERPL-SCNC: 3.4 MMOL/L (ref 3.5–5.2)
PROMYELOCYTES NFR BLD MANUAL: 1 % (ref 0–0)
RBC # BLD AUTO: 3.19 10*6/MM3 (ref 3.77–5.28)
SCAN SLIDE: NORMAL
SODIUM SERPL-SCNC: 133 MMOL/L (ref 136–145)
STRESS TARGET HR: 148 BPM
VARIANT LYMPHS NFR BLD MANUAL: 1 % (ref 0–5)
VARIANT LYMPHS NFR BLD MANUAL: 9 % (ref 19.6–45.3)
WBC MORPH BLD: NORMAL
WBC NRBC COR # BLD: 205.9 10*3/MM3 (ref 3.4–10.8)

## 2022-06-30 PROCEDURE — 99223 1ST HOSP IP/OBS HIGH 75: CPT | Performed by: INTERNAL MEDICINE

## 2022-06-30 PROCEDURE — 63710000001 INSULIN LISPRO (HUMAN) PER 5 UNITS: Performed by: HOSPITALIST

## 2022-06-30 PROCEDURE — 63710000001 INSULIN GLARGINE PER 5 UNITS: Performed by: HOSPITALIST

## 2022-06-30 PROCEDURE — G0108 DIAB MANAGE TRN  PER INDIV: HCPCS

## 2022-06-30 PROCEDURE — 82962 GLUCOSE BLOOD TEST: CPT

## 2022-06-30 PROCEDURE — 73501 X-RAY EXAM HIP UNI 1 VIEW: CPT

## 2022-06-30 PROCEDURE — 93306 TTE W/DOPPLER COMPLETE: CPT | Performed by: INTERNAL MEDICINE

## 2022-06-30 PROCEDURE — 25010000002 FUROSEMIDE PER 20 MG: Performed by: INTERNAL MEDICINE

## 2022-06-30 PROCEDURE — 99221 1ST HOSP IP/OBS SF/LOW 40: CPT | Performed by: INTERNAL MEDICINE

## 2022-06-30 PROCEDURE — 99233 SBSQ HOSP IP/OBS HIGH 50: CPT | Performed by: INTERNAL MEDICINE

## 2022-06-30 PROCEDURE — 93306 TTE W/DOPPLER COMPLETE: CPT

## 2022-06-30 RX ORDER — FUROSEMIDE 10 MG/ML
20 INJECTION INTRAMUSCULAR; INTRAVENOUS DAILY
Status: DISCONTINUED | OUTPATIENT
Start: 2022-06-30 | End: 2022-06-30

## 2022-06-30 RX ORDER — METOPROLOL SUCCINATE 25 MG/1
25 TABLET, EXTENDED RELEASE ORAL DAILY
Status: DISCONTINUED | OUTPATIENT
Start: 2022-06-30 | End: 2022-07-09 | Stop reason: HOSPADM

## 2022-06-30 RX ORDER — GUAIFENESIN AND CODEINE PHOSPHATE 100; 10 MG/5ML; MG/5ML
5 SOLUTION ORAL EVERY 4 HOURS PRN
Status: DISCONTINUED | OUTPATIENT
Start: 2022-06-30 | End: 2022-07-09 | Stop reason: HOSPADM

## 2022-06-30 RX ORDER — TRAZODONE HYDROCHLORIDE 50 MG/1
50 TABLET ORAL NIGHTLY
Status: DISCONTINUED | OUTPATIENT
Start: 2022-06-30 | End: 2022-07-09 | Stop reason: HOSPADM

## 2022-06-30 RX ORDER — INSULIN LISPRO 100 [IU]/ML
1 INJECTION, SOLUTION INTRAVENOUS; SUBCUTANEOUS
Status: CANCELLED | OUTPATIENT
Start: 2022-06-30

## 2022-06-30 RX ORDER — OXYCODONE HYDROCHLORIDE 5 MG/1
10 TABLET ORAL EVERY 4 HOURS PRN
Status: DISCONTINUED | OUTPATIENT
Start: 2022-06-30 | End: 2022-07-09 | Stop reason: HOSPADM

## 2022-06-30 RX ORDER — GUAIFENESIN 600 MG/1
600 TABLET, EXTENDED RELEASE ORAL EVERY 12 HOURS SCHEDULED
Status: DISCONTINUED | OUTPATIENT
Start: 2022-06-30 | End: 2022-07-09 | Stop reason: HOSPADM

## 2022-06-30 RX ORDER — FUROSEMIDE 10 MG/ML
20 INJECTION INTRAMUSCULAR; INTRAVENOUS
Status: DISCONTINUED | OUTPATIENT
Start: 2022-06-30 | End: 2022-07-05

## 2022-06-30 RX ADMIN — ALPRAZOLAM 0.25 MG: 0.25 TABLET ORAL at 00:06

## 2022-06-30 RX ADMIN — ALPRAZOLAM 0.25 MG: 0.25 TABLET ORAL at 20:44

## 2022-06-30 RX ADMIN — OXYCODONE 10 MG: 5 TABLET ORAL at 14:35

## 2022-06-30 RX ADMIN — Medication 10 ML: at 20:45

## 2022-06-30 RX ADMIN — METOPROLOL SUCCINATE 25 MG: 25 TABLET, FILM COATED, EXTENDED RELEASE ORAL at 12:55

## 2022-06-30 RX ADMIN — INSULIN LISPRO 6 UNITS: 100 INJECTION, SOLUTION INTRAVENOUS; SUBCUTANEOUS at 17:13

## 2022-06-30 RX ADMIN — GABAPENTIN 300 MG: 300 CAPSULE ORAL at 08:49

## 2022-06-30 RX ADMIN — RIVAROXABAN 20 MG: 20 TABLET, FILM COATED ORAL at 17:12

## 2022-06-30 RX ADMIN — FUROSEMIDE 20 MG: 10 INJECTION, SOLUTION INTRAMUSCULAR; INTRAVENOUS at 17:12

## 2022-06-30 RX ADMIN — Medication 10 ML: at 00:06

## 2022-06-30 RX ADMIN — INSULIN LISPRO 7 UNITS: 100 INJECTION, SOLUTION INTRAVENOUS; SUBCUTANEOUS at 00:06

## 2022-06-30 RX ADMIN — GUAIFENESIN 600 MG: 600 TABLET, EXTENDED RELEASE ORAL at 20:45

## 2022-06-30 RX ADMIN — Medication 10 ML: at 12:55

## 2022-06-30 RX ADMIN — GUAIFENESIN 600 MG: 600 TABLET, EXTENDED RELEASE ORAL at 10:57

## 2022-06-30 RX ADMIN — GABAPENTIN 300 MG: 300 CAPSULE ORAL at 00:06

## 2022-06-30 RX ADMIN — GABAPENTIN 300 MG: 300 CAPSULE ORAL at 17:12

## 2022-06-30 RX ADMIN — OXYCODONE 10 MG: 5 TABLET ORAL at 00:06

## 2022-06-30 RX ADMIN — CITALOPRAM HYDROBROMIDE 10 MG: 20 TABLET ORAL at 12:55

## 2022-06-30 RX ADMIN — ACETAMINOPHEN 650 MG: 325 TABLET, FILM COATED ORAL at 08:49

## 2022-06-30 RX ADMIN — OXYCODONE 10 MG: 5 TABLET ORAL at 20:44

## 2022-06-30 RX ADMIN — TRAZODONE HYDROCHLORIDE 50 MG: 50 TABLET ORAL at 20:45

## 2022-06-30 RX ADMIN — HYDROXYUREA 1000 MG: 500 CAPSULE ORAL at 00:05

## 2022-06-30 RX ADMIN — HYDROXYUREA 1000 MG: 500 CAPSULE ORAL at 20:45

## 2022-06-30 RX ADMIN — HYDROXYUREA 1000 MG: 500 CAPSULE ORAL at 08:49

## 2022-06-30 RX ADMIN — GABAPENTIN 300 MG: 300 CAPSULE ORAL at 20:44

## 2022-06-30 RX ADMIN — INSULIN GLARGINE 25 UNITS: 100 INJECTION, SOLUTION SUBCUTANEOUS at 13:35

## 2022-06-30 RX ADMIN — INSULIN LISPRO 2 UNITS: 100 INJECTION, SOLUTION INTRAVENOUS; SUBCUTANEOUS at 13:35

## 2022-06-30 NOTE — TELEPHONE ENCOUNTER
Caller: Sayra Cabezas    Relationship: Mother    Best call back number: [phone]    What is the best time to reach you: ANYTIME    Who are you requesting to speak with (clinical staff, provider,  specific staff member): SCHEDULING    What was the call regarding: PTS MOTHER CALLING TO [facility] PTS APPT FOR TODAY 6/30 - PT IS IN THE HOSPITAL. SHE WASN'T SURE IF OUR DRS COULD SEE PT WHILE SHE IS THERE.     PLEASE CALL TO ADVISE. TRIED TO W/T TO OFFICE BUT WAS UNSUCCESSFUL.     Do you require a callback: YES

## 2022-06-30 NOTE — OUTREACH NOTE
Medical Week 4 Survey    Flowsheet Row Responses   [facility] facility patient discharged from? [facility]   Does the patient have one of the following disease processes/diagnoses(primary or secondary)? Other   Week 4 attempt successful? No   Revoke Readmitted          SIERRA DARBY - Registered Nurse

## 2022-07-01 LAB
GLUCOSE BLDC GLUCOMTR-MCNC: 213 MG/DL (ref 70–105)
GLUCOSE BLDC GLUCOMTR-MCNC: 234 MG/DL (ref 70–105)
GLUCOSE BLDC GLUCOMTR-MCNC: 273 MG/DL (ref 70–105)
GLUCOSE BLDC GLUCOMTR-MCNC: 277 MG/DL (ref 70–105)
GLUCOSE BLDC GLUCOMTR-MCNC: 297 MG/DL (ref 70–105)

## 2022-07-01 PROCEDURE — 63710000001 INSULIN LISPRO (HUMAN) PER 5 UNITS: Performed by: HOSPITALIST

## 2022-07-01 PROCEDURE — 82962 GLUCOSE BLOOD TEST: CPT

## 2022-07-01 PROCEDURE — 99233 SBSQ HOSP IP/OBS HIGH 50: CPT | Performed by: INTERNAL MEDICINE

## 2022-07-01 PROCEDURE — 25010000002 FUROSEMIDE PER 20 MG: Performed by: INTERNAL MEDICINE

## 2022-07-01 PROCEDURE — 63710000001 INSULIN GLARGINE PER 5 UNITS: Performed by: HOSPITALIST

## 2022-07-01 RX ORDER — LOSARTAN POTASSIUM 50 MG/1
50 TABLET ORAL
Status: DISCONTINUED | OUTPATIENT
Start: 2022-07-01 | End: 2022-07-05

## 2022-07-01 RX ADMIN — INSULIN GLARGINE 25 UNITS: 100 INJECTION, SOLUTION SUBCUTANEOUS at 08:12

## 2022-07-01 RX ADMIN — Medication 10 ML: at 21:11

## 2022-07-01 RX ADMIN — TRAZODONE HYDROCHLORIDE 50 MG: 50 TABLET ORAL at 21:04

## 2022-07-01 RX ADMIN — FUROSEMIDE 20 MG: 10 INJECTION, SOLUTION INTRAMUSCULAR; INTRAVENOUS at 17:18

## 2022-07-01 RX ADMIN — GABAPENTIN 300 MG: 300 CAPSULE ORAL at 21:04

## 2022-07-01 RX ADMIN — INSULIN LISPRO 6 UNITS: 100 INJECTION, SOLUTION INTRAVENOUS; SUBCUTANEOUS at 08:13

## 2022-07-01 RX ADMIN — HYDROXYUREA 1000 MG: 500 CAPSULE ORAL at 08:14

## 2022-07-01 RX ADMIN — OXYCODONE 10 MG: 5 TABLET ORAL at 21:04

## 2022-07-01 RX ADMIN — CITALOPRAM HYDROBROMIDE 10 MG: 20 TABLET ORAL at 08:13

## 2022-07-01 RX ADMIN — LOSARTAN POTASSIUM 50 MG: 50 TABLET, FILM COATED ORAL at 17:18

## 2022-07-01 RX ADMIN — Medication 10 ML: at 08:12

## 2022-07-01 RX ADMIN — INSULIN LISPRO 6 UNITS: 100 INJECTION, SOLUTION INTRAVENOUS; SUBCUTANEOUS at 21:03

## 2022-07-01 RX ADMIN — FUROSEMIDE 20 MG: 10 INJECTION, SOLUTION INTRAMUSCULAR; INTRAVENOUS at 08:12

## 2022-07-01 RX ADMIN — GUAIFENESIN 600 MG: 600 TABLET, EXTENDED RELEASE ORAL at 21:04

## 2022-07-01 RX ADMIN — METOPROLOL SUCCINATE 25 MG: 25 TABLET, FILM COATED, EXTENDED RELEASE ORAL at 08:45

## 2022-07-01 RX ADMIN — GABAPENTIN 300 MG: 300 CAPSULE ORAL at 17:18

## 2022-07-01 RX ADMIN — HYDROXYUREA 1000 MG: 500 CAPSULE ORAL at 21:04

## 2022-07-01 RX ADMIN — INSULIN LISPRO 4 UNITS: 100 INJECTION, SOLUTION INTRAVENOUS; SUBCUTANEOUS at 13:03

## 2022-07-01 RX ADMIN — OXYCODONE 10 MG: 5 TABLET ORAL at 08:14

## 2022-07-01 RX ADMIN — INSULIN LISPRO 4 UNITS: 100 INJECTION, SOLUTION INTRAVENOUS; SUBCUTANEOUS at 17:18

## 2022-07-01 RX ADMIN — GUAIFENESIN 600 MG: 600 TABLET, EXTENDED RELEASE ORAL at 08:14

## 2022-07-01 RX ADMIN — RIVAROXABAN 20 MG: 20 TABLET, FILM COATED ORAL at 17:18

## 2022-07-01 RX ADMIN — GABAPENTIN 300 MG: 300 CAPSULE ORAL at 08:14

## 2022-07-01 NOTE — PROGRESS NOTES
Hematology/Oncology Inpatient Progress Note    PATIENT NAME: Nieves Mc  : 1975  MRN: [id number]    CHIEF COMPLAINT: Dyspnea and cough    HISTORY OF PRESENT ILLNESS:      Nieves Mc is a 46 y.o. female who presented to [facility] on 2022 with complaints of worsening shortness of breath, cough,, chest pain for a week.  CT scan shows continued severe infiltrates unchanged from last CT scan.  Patient was also found to be in blast crisis with her history of CML and blasts of 27% on CBC.  Patient was admitted for further evaluation and treatment.     22  Hematology/Oncology was consulted for established patient with CML presenting with 27% blasts on CBC.  Patient is currently on Tasigna twice daily and Hydrea with history of noncompliance with medications and recent office visit with WBC of 200,000 as well as Hgb of 7.7 due to uncontrolled CML.  Patient was continued on Tasigna and Hydrea at office visit.  After presenting yesterday with blast crisis at 27%, her CBC today shows decreased blasts at 17%, and WBC of 205,000.       Subjective     Better today. Still quite dyspneic with minimal exertion and coughing some. Able to eat without nausea and has been able to take her medications.   ROS:  Review of Systems   Constitutional: Negative for activity change, appetite change, chills, diaphoresis, fatigue, fever and unexpected weight change.   HENT: Negative for congestion, dental problem, drooling, ear discharge, ear pain, facial swelling, hearing loss, mouth sores, nosebleeds, postnasal drip, rhinorrhea, sinus pressure, sinus pain, sneezing, sore throat, tinnitus, trouble swallowing and voice change.    Eyes: Negative for photophobia, pain, discharge, redness, itching and visual disturbance.   Respiratory: Positive for cough and shortness of breath. Negative for apnea, choking, chest tightness, wheezing and stridor.    Cardiovascular: Negative for chest pain, palpitations and leg  swelling.   Gastrointestinal: Negative for abdominal distention, abdominal pain, anal bleeding, blood in stool, constipation, diarrhea, nausea, rectal pain and vomiting.   Endocrine: Negative for cold intolerance, heat intolerance, polydipsia and polyuria.   Genitourinary: Negative for decreased urine volume, difficulty urinating, dysuria, flank pain, frequency, genital sores, hematuria and urgency.   Musculoskeletal: Negative for arthralgias, back pain, gait problem, joint swelling, myalgias, neck pain and neck stiffness.   Skin: Negative for color change, pallor and rash.   Neurological: Negative for dizziness, tremors, seizures, syncope, facial asymmetry, speech difficulty, weakness, light-headedness, numbness and headaches.   Hematological: Negative for adenopathy. Does not bruise/bleed easily.   Psychiatric/Behavioral: Negative for agitation, behavioral problems, confusion, decreased concentration, hallucinations, self-injury, sleep disturbance and suicidal ideas. The patient is not nervous/anxious.       MEDICATIONS:    Scheduled Meds:  citalopram, 10 mg, Oral, Daily  furosemide, 20 mg, Intravenous, BID  gabapentin, 300 mg, Oral, TID  guaiFENesin, 600 mg, Oral, Q12H  hydroxyurea, 1,000 mg, Oral, BID  insulin glargine, 25 Units, Subcutaneous, Daily  insulin lispro, 0-9 Units, Subcutaneous, 4x Daily With Meals & Nightly  losartan, 50 mg, Oral, Q24H  metoprolol succinate XL, 25 mg, Oral, Daily  rivaroxaban, 20 mg, Oral, Daily With Dinner  sodium chloride, 10 mL, Intravenous, Q12H  traZODone, 50 mg, Oral, Nightly       Continuous Infusions:      PRN Meds:  •  acetaminophen **OR** acetaminophen **OR** acetaminophen  •  ALPRAZolam  •  aluminum-magnesium hydroxide-simethicone  •  dextrose  •  dextrose  •  glucagon (human recombinant)  •  guaiFENesin-codeine  •  insulin lispro **AND** insulin lispro  •  melatonin  •  ondansetron **OR** ondansetron  •  oxyCODONE  •  sodium chloride  •  [COMPLETED] Insert peripheral IV  "**AND** sodium chloride  •  sodium chloride     ALLERGIES:    Allergies   Allergen Reactions   • Hydromorphone GI Intolerance     dilaudid   • Morphine Nausea And Vomiting   • Propofol Hives     Previously tolerated being premedicated with diphenhydramine and famotadine     Objective    VITALS:   /81   Pulse 104   Temp 98.3 °F (36.8 °C) (Oral)   Resp 18   Ht 162.6 cm (64\")   Wt 57.4 kg (126 lb 9.6 oz)   LMP 05/25/2022 (Approximate)   SpO2 98%   BMI 21.73 kg/m²     PHYSICAL EXAM: (performed by MD)  Physical Exam  Constitutional:       General: She is not in acute distress.     Appearance: She is not toxic-appearing or diaphoretic.   HENT:      Head: Normocephalic and atraumatic.      Right Ear: External ear normal.      Left Ear: External ear normal.      Nose: Nose normal.      Mouth/Throat:      Mouth: Mucous membranes are moist.      Pharynx: Oropharynx is clear. No oropharyngeal exudate or posterior oropharyngeal erythema.   Eyes:      General: No scleral icterus.        Right eye: No discharge.         Left eye: No discharge.      Conjunctiva/sclera: Conjunctivae normal.      Pupils: Pupils are equal, round, and reactive to light.   Cardiovascular:      Rate and Rhythm: Normal rate and regular rhythm.      Pulses: Normal pulses.      Heart sounds: No murmur heard.    No friction rub. No gallop.   Pulmonary:      Effort: No respiratory distress.      Breath sounds: No stridor. Rales present. No wheezing or rhonchi.      Comments: Breath sounds markedly diminished bilaterally. No wheezing.   Abdominal:      General: Bowel sounds are normal. There is no distension.      Palpations: Abdomen is soft. There is no mass.      Tenderness: There is no abdominal tenderness. There is no right CVA tenderness, left CVA tenderness, guarding or rebound.      Hernia: No hernia is present.      Comments: Protuberant, soft and not tender. The liver and spleen don't appear enlarged.    Musculoskeletal:         " General: No swelling, tenderness, deformity or signs of injury.      Cervical back: No rigidity.      Right lower leg: No edema.      Left lower leg: No edema.   Lymphadenopathy:      Cervical: No cervical adenopathy.   Skin:     Coloration: Skin is not jaundiced.      Findings: No bruising, lesion or rash.   Neurological:      General: No focal deficit present.      Mental Status: She is alert and oriented to person, place, and time.      Cranial Nerves: No cranial nerve deficit.      Motor: No weakness.      Gait: Gait normal.   Psychiatric:         Mood and Affect: Mood normal.         Behavior: Behavior normal.         Thought Content: Thought content normal.         Judgment: Judgment normal.       JAMEL Truong MD performed the physical exam on 7/1/2022 as documented above.     RECENT LABS:  Lab Results (last 24 hours)     Procedure Component Value Units Date/Time    Blood Culture - Blood, Arm, Left [[id number]]  (Normal) Collected: 06/29/22 1625    Specimen: Blood from Arm, Left Updated: 07/01/22 1632     Blood Culture No growth at 2 days    POC Glucose Once [[id number]]  (Abnormal) Collected: 07/01/22 1605    Specimen: Blood Updated: 07/01/22 1606     Glucose 213 mg/dL      Comment: Serial Number: [id number]:  [id number]       Blood Culture - Blood, Arm, Left [[id number]]  (Normal) Collected: 06/29/22 1533    Specimen: Blood from Arm, Left Updated: 07/01/22 1546     Blood Culture No growth at 2 days    POC Glucose Once [[id number]]  (Abnormal) Collected: 07/01/22 1108    Specimen: Blood Updated: 07/01/22 1109     Glucose 234 mg/dL      Comment: Serial Number: [id number]:  [id number]       POC Glucose Once [[id number]]  (Abnormal) Collected: 07/01/22 0739    Specimen: Blood Updated: 07/01/22 0740     Glucose 273 mg/dL      Comment: Serial Number: [id number]:  [id number]             IMAGING REVIEWED:  XR Hip With or Without Pelvis 1 View Left    Result Date: 6/30/2022  Unremarkable left hip  and pelvis  Electronically Signed By-Dom Corral On:6/30/2022 10:18 AM This report was finalized on [id number] by  Dom Corral, .      Assessment & Plan   ASSESSMENT:  1. CML: On hydroxyurea 2 g per day. Will continue to monitor closely. Additionally she needs a bone marrow biopsy and aspiration. Discussed with her   2. Lung disease. Could this be a complication of previous treatment?  3. Will continue to follow without change for now.     PLAN:  1. As above.     Yifan Truong MD on 7/1/2022 at 19:26

## 2022-07-01 NOTE — PROGRESS NOTES
[facility] Medicine Services Daily Progress Note    Patient Name: Nieves Mc  : 1975  MRN: [id number]  Primary Care Physician:  Houston Oro MD  Date of admission: 2022      Subjective      Chief Complaint: Shortness of breath.      Patient Reports:            2022.  Patient was seen and examined.  Patient complains of musculoskeletal pain.  Patient complained of cough and generalized weakness.        2022.  Patient was seen and examined.  Patient reported slight improvement in her symptoms.  No overnight events noted.        Review of Systems   HENT: Negative.    Eyes: Negative.    Cardiovascular: Negative.    Respiratory: Positive for shortness of breath.    Endocrine: Negative.    Hematologic/Lymphatic: Negative.    Skin: Negative.    Musculoskeletal: Positive for joint pain and muscle cramps.   Gastrointestinal: Negative.    Genitourinary: Negative.    Neurological: Positive for weakness.   Psychiatric/Behavioral: Negative.    Allergic/Immunologic: Negative.             Objective      Vitals:   Temp:  [97.3 °F (36.3 °C)-98.3 °F (36.8 °C)] 98.3 °F (36.8 °C)  Heart Rate:  [102-110] 104  Resp:  [18-20] 18  BP: (101-129)/(52-81) 128/81  Flow (L/min):  [3] 3    Physical Exam  Vitals and nursing note reviewed.   Constitutional:       General: She is not in acute distress.     Appearance: She is ill-appearing.      Comments: Patient appears chronically ill.   HENT:      Head: Normocephalic and atraumatic.      Nose: Nose normal. No congestion or rhinorrhea.      Mouth/Throat:      Mouth: Mucous membranes are moist.      Pharynx: Oropharynx is clear. No oropharyngeal exudate or posterior oropharyngeal erythema.   Eyes:      Pupils: Pupils are equal, round, and reactive to light.   Cardiovascular:      Pulses: Normal pulses.      Heart sounds: Normal heart sounds. No murmur heard.    No friction rub. No gallop.      Comments: S1 and S2 present.  Positive  tachycardia.  Pulmonary:      Effort: No respiratory distress.      Breath sounds: No wheezing or rhonchi.      Comments: Decreased air entry bilaterally.  Poor air movement.  Chest:      Chest wall: No tenderness.   Abdominal:      General: Abdomen is flat. Bowel sounds are normal. There is no distension.      Palpations: Abdomen is soft.      Tenderness: There is no right CVA tenderness.   Musculoskeletal:         General: No swelling, tenderness, deformity or signs of injury.      Cervical back: No tenderness.      Right lower leg: No edema.      Left lower leg: No edema.   Skin:     Capillary Refill: Capillary refill takes less than 2 seconds.      Coloration: Skin is not jaundiced.      Findings: No bruising, lesion or rash.   Neurological:      Mental Status: She is alert.      Comments: No facial asymmetry noted.  Gait and station not tested.   Psychiatric:         Behavior: Behavior normal.      Comments: No agitation.               Result Review    Result Review:  I have personally reviewed the results from the time of this admission to 7/1/2022 18:08 EDT and agree with these findings:  [x]  Laboratory  [x]  Microbiology  [x]  Radiology  []  EKG/Telemetry   []  Cardiology/Vascular   []  Pathology  []  Old records  []  Other:  Most notable findings include:           Assessment & Plan    From previous notes and with minor updates.  Brief Patient Summary:    Patient is a 46 y.o. female with past medical history of CML, depression with anxiety, hypertension, chronic pain syndrome, COVID-19 virus infection,  prior PE, and type 2 diabetes mellitus who presented to [facility] on 6/29/2022 complaining of shortness of breath and chest pain.  Patient reports having worsening shortness of breath and cough, particularly over the last week to week and a half.  She mentioned associated pleuritic chest pain and persistent hot flashes.  She reports she has been trying to take her medications as prescribed.  However, when she tries to eat or drink, it just comes right back up.  She states her symptoms are similar to when she was last in the hospital. Of note, patient recently hospitalized from 6/1/2022-6/10/2022 for management of acute hypoxic respiratory failure in the setting of TRALI and CML.  She is on nilotinib (Tasigna) for her CML, but states it has not been working. She is scheduled to f/u with oncology in the clinic tomorrow for follow-up, but her symptoms were too bad so she came to the hospital for further assessment.  Patient was diagnosed with acute systolic heart failure and a blast crisis.  Cardiology and the oncology consults were completed.       citalopram, 10 mg, Oral, Daily  furosemide, 20 mg, Intravenous, BID  gabapentin, 300 mg, Oral, TID  guaiFENesin, 600 mg, Oral, Q12H  hydroxyurea, 1,000 mg, Oral, BID  insulin glargine, 25 Units, Subcutaneous, Daily  insulin lispro, 0-9 Units, Subcutaneous, 4x Daily With Meals & Nightly  losartan, 50 mg, Oral, Q24H  metoprolol succinate XL, 25 mg, Oral, Daily  rivaroxaban, 20 mg, Oral, Daily With Dinner  sodium chloride, 10 mL, Intravenous, Q12H  traZODone, 50 mg, Oral, Nightly             Active Hospital Problems:  Active Hospital Problems    Diagnosis    • Acute systolic CHF (congestive heart failure) (HCC)  Continue diuresis.  Follow cardiology recommendations.      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    Hypertension.  Treat with losartan.    Pulmonary embolism.  Treat with Xarelto.        • Moderate malnutrition (HCC)  Completes nutrition consult.    Chronic pain syndrome.  Treat with pain control, Roxicodone as needed.      • Type 2 diabetes mellitus with diabetic polyneuropathy, with long-term current use of insulin (HCC)  Treat with insulin therapy.      • Depression with anxiety  Treat with citalopram.      • History of pulmonary embolism  Continue anticoagulation.        • CML (chronic  myelocytic leukemia) (HCC)  Follow oncology recommendations.          Continue appropriate patient's home medications for other chronic medical conditions.  Continue the present level of care.  Patient and family agreed with the plan of care.      DVT prophylaxis:  Medical DVT prophylaxis orders are present.    CODE STATUS:    Code Status (Patient has no pulse and is not breathing): CPR (Attempt to Resuscitate)  Medical Interventions (Patient has pulse or is breathing): Full Support      Disposition: Pending patient's clinical improvement.    This patient has been examined wearing appropriate Personal Protective Equipment and discussed with hospital infection control department, [facility], infectious disease specialist and pulmonologist. 07/01/22      Electronically signed by Braden Huang MD, FACP, 07/01/22, 18:08 EDT.      [facility], [place] Hospitalist Team

## 2022-07-01 NOTE — CONSULTS
Nutrition Services    Patient Name: Nieves Mc  YOB: 1975  MRN: [id number]  Admission date: 2022    Comment:    Magic Cup q daily (290 kcals, 9 g protein if consumed)     Moderate chronic disease related malnutrition related to catabolic nature of disease process as evidenced by overall moderate muscle/fat wasting.    See MSA at bottom of note.    PPE Documentation        PPE Worn By Provider mask, gloves and eye protection   PPE Worn By Patient  none     CLINICAL NUTRITION ASSESSMENT      Reason for Assessment : VENU, MST 5     H&P      Past Medical History:   Diagnosis Date   • Bone pain    • Extremity pain     Carlin. legs pain   • Leg pain     left leg greater   • Migraine    • Pulmonary embolism (HCC)    • Vision loss     doing surgery       Past Surgical History:   Procedure Laterality Date   • BONE MARROW BIOPSY     • BREAST SURGERY     • BRONCHOSCOPY N/A 2022    Procedure: BRONCHOSCOPY bilateral lung washing;  Surgeon: Charlene Camara MD;  Location: [facility] ENDOSCOPY;  Service: Pulmonary;  Laterality: N/A;  post: rule out infection vs transfusion lung injury   •  SECTION     • CHOLECYSTECTOMY     • EYE SURGERY      laser surgery due  to hemmorage--- 2021-- another surgery  lt eye11/15/21   • RETINAL DETACHMENT SURGERY     • SPINE SURGERY      Lombardi spinal block   • TUBAL ABDOMINAL LIGATION          Current Problems   Acute CHF    CML    Moderate malnutrition    DM    Depression, anxiety    Pulmonary embolism       Encounter Information        Trending Narrative     : Visited patient in room- reports her appetite is pretty good currently, but can be variable. States she eats a lot of fruit at baseline. Typical intake recall:  B- cereal with milk, fruit  L- mac and cheese, grilled cheese  D- roast/chili, baked potato    States she has had some taste changes due to chemo in the past, none at this time. Does not take any supplements at baseline, agreeable to trying Magic  "Cup at lunch daily- does not like standard supplements, monitor BG.     Anthropometrics        Current Height, Weight Height: 162.6 cm (64\")  Weight: 57.4 kg (126 lb 9.6 oz) (06/30/22 0504)       Ideal Body Weight (IBW) 120lb   Usual Body Weight (UBW) Pt unsure, but thinks 140-160lb in the last 8 months       Trending Weight Hx     This admission: 7/1: up 2lb since admit             PTA: 7/1: current weight down 5% x1 year    Wt Readings from Last 30 Encounters:   06/30/22 0504 57.4 kg (126 lb 9.6 oz)   06/29/22 2328 56.5 kg (124 lb 9.6 oz)   06/29/22 2019 56.5 kg (124 lb 9.6 oz)   06/29/22 1500 54.4 kg (120 lb)   06/10/22 0500 56.1 kg (123 lb 10.9 oz)   06/09/22 0500 58.5 kg (128 lb 15.5 oz)   06/05/22 0929 56.7 kg (125 lb)   06/01/22 1625 56.7 kg (125 lb)   06/01/22 1440 56.7 kg (125 lb)   05/18/22 0920 56.7 kg (125 lb)   05/09/22 1114 56.7 kg (125 lb)   03/29/22 1606 57 kg (125 lb 9.6 oz)   03/01/22 1604 53.5 kg (118 lb)   02/14/22 0850 53.5 kg (118 lb)   01/28/22 1202 58.1 kg (128 lb)   01/24/22 1117 60.3 kg (133 lb)   01/17/22 0536 65.1 kg (143 lb 8.3 oz)   01/16/22 0300 65.1 kg (143 lb 8.3 oz)   01/15/22 0443 64.8 kg (142 lb 13.7 oz)   01/14/22 0425 65.1 kg (143 lb 8.3 oz)   01/13/22 0219 65 kg (143 lb 4.8 oz)   01/12/22 1237 59 kg (130 lb)   11/15/21 1010 59 kg (130 lb)   10/18/21 0814 59 kg (130 lb)   09/28/21 0919 59.3 kg (130 lb 12.8 oz)   09/22/21 1004 60.3 kg (133 lb)   07/19/21 0909 60.3 kg (133 lb)   06/16/21 0843 60.3 kg (133 lb)   06/07/21 0920 60.3 kg (133 lb)   04/19/21 0855 64.3 kg (141 lb 12.8 oz)   04/13/21 1817 61.2 kg (135 lb)   03/11/21 0418 66 kg (145 lb 8.1 oz)   02/22/21 0548 61.7 kg (136 lb 0.4 oz)   10/26/20 1901 68.3 kg (150 lb 9.2 oz)   10/16/19 1811 76 kg (167 lb 8.8 oz)   07/01/19 2239 74.8 kg (165 lb)   06/30/19 1555 76.5 kg (168 lb 10.4 oz)   11/21/16 1425 72.6 kg (160 lb)      BMI kg/m2 Body mass index is 21.73 kg/m².       Labs        Pertinent Labs    Results from last 7 days "   Lab Units 06/29/22  2343 06/29/22  1533   SODIUM mmol/L 133* 134*   POTASSIUM mmol/L 3.4* 3.9   CHLORIDE mmol/L 94* 97*   CO2 mmol/L 24.0 23.0   BUN mg/dL 10 8   CREATININE mg/dL 0.65 0.60   CALCIUM mg/dL 9.0 8.8   BILIRUBIN mg/dL  --  1.4*   ALK PHOS U/L  --  985*   ALT (SGPT) U/L  --  14   AST (SGOT) U/L  --  22   GLUCOSE mg/dL 325* 275*     Results from last 7 days   Lab Units 06/29/22  2343   HEMOGLOBIN g/dL 8.0*   HEMATOCRIT % 25.7*     COVID19   Date Value Ref Range Status   06/29/2022 Not Detected Not Detected - Ref. Range Final     Lab Results   Component Value Date    HGBA1C 8.4 (H) 06/29/2022        Medications    Scheduled Medications citalopram, 10 mg, Oral, Daily  furosemide, 20 mg, Intravenous, BID  gabapentin, 300 mg, Oral, TID  guaiFENesin, 600 mg, Oral, Q12H  hydroxyurea, 1,000 mg, Oral, BID  insulin glargine, 25 Units, Subcutaneous, Daily  insulin lispro, 0-9 Units, Subcutaneous, 4x Daily With Meals & Nightly  metoprolol succinate XL, 25 mg, Oral, Daily  rivaroxaban, 20 mg, Oral, Daily With Dinner  sodium chloride, 10 mL, Intravenous, Q12H  traZODone, 50 mg, Oral, Nightly        Infusions      PRN Medications •  acetaminophen **OR** acetaminophen **OR** acetaminophen  •  ALPRAZolam  •  aluminum-magnesium hydroxide-simethicone  •  dextrose  •  dextrose  •  glucagon (human recombinant)  •  guaiFENesin-codeine  •  insulin lispro **AND** insulin lispro  •  melatonin  •  ondansetron **OR** ondansetron  •  oxyCODONE  •  sodium chloride  •  [COMPLETED] Insert peripheral IV **AND** sodium chloride  •  sodium chloride     Physical Findings        Trending Physical   Appearance, NFPE 7/1: NFPE completed, meets moderate malnutrition criteria   --  Edema  none     Bowel Function BM6/28   Tubes none   Chewing/Swallowing No issues reported   Skin No breakdown   --  Current Nutrition Orders & Evaluation of Intake       Oral Nutrition     Food Allergies NKFA   Current PO Diet Diet Diabetic/Consistent Carbs;  Diabetic - Consistent Carb   Supplement    PO Evaluation     Trending % PO Intake 100%   --  Nutritional Risk Screening        NRS-2002 Score          Nutrition Diagnosis         Nutrition Dx Problem 1 Moderate chronic disease related malnutrition related to catabolic nature of disease process as evidenced by overall moderate muscle/fat wasting.      Nutrition Dx Problem 2        Intervention Goal         Intervention Goal(s) Meal intake maintained at least 75% of meals  Stable weight     Nutrition Intervention        RD Action Add Magic Cup at lunch daily      Nutrition Prescription          Diet Prescription CCHO   Supplement Prescription Magic Cup q daily (290 kcals, 9 g protein if consumed)      --  Monitor/Evaluation        Monitor PO intake, Supplement intake, Pertinent labs, Weight, Skin status, GI status     Malnutrition Severity Assessment      Patient meets criteria for : Moderate (non-severe) Malnutrition  Malnutrition Type (last 8 hours)     Malnutrition Severity Assessment     Row Name 07/02/22 0854       Malnutrition Severity Assessment    Malnutrition Type Chronic Disease - Related Malnutrition    Row Name 07/02/22 0854       Muscle Loss    Loss of Muscle Mass Findings Moderate    [place] Region Moderate - slight depression    Clavicle Bone Region Moderate - some protrusion in females, visible in males    Acromion Bone Region Moderate - acromion may slightly protrude    Scapular Bone Region Moderate - mild depression, bones may show slightly    Dorsal Hand Region Moderate - slight depression    Patellar Region Moderate - patella more prominent, less muscle definition around patella    Anterior Thigh Region Moderate - mild depression on inner thigh    Posterior Calf Region Moderate - some roundness, slight firmness    Row Name 07/02/22 0854       Fat Loss    Subcutaneous Fat Loss Findings Moderate    Orbital Region  Moderate -  somewhat hollowness, slightly dark circles    Upper Arm Region Moderate -  some fat tissue, not ample    Thoracic & Lumbar Region None    Row Name 07/02/22 0854       Criteria Met (Must meet criteria for severity in at least 2 of these categories: M Wasting, Fat Loss, Fluid, Secondary Signs, Wt. Status, Intake)    Patient meets criteria for  Moderate (non-severe) Malnutrition                       Electronically signed by:  Shiloh Young RD  07/01/22 13:06 EDT

## 2022-07-01 NOTE — PLAN OF CARE
Problem: Adult Inpatient Plan of Care  Goal: Plan of Care Review  Outcome: Ongoing, Progressing  Goal: Patient-Specific Goal (Individualized)  Outcome: Ongoing, Progressing  Goal: Absence of Hospital-Acquired Illness or Injury  Outcome: Ongoing, Progressing  Intervention: Identify and Manage Fall Risk  Recent Flowsheet Documentation  Taken 7/1/2022 0400 by Karissa Ware RN  Safety Promotion/Fall Prevention: safety round/check completed  Taken 7/1/2022 0200 by Karissa Ware RN  Safety Promotion/Fall Prevention: safety round/check completed  Taken 7/1/2022 0000 by Karissa Ware RN  Safety Promotion/Fall Prevention: safety round/check completed  Taken 6/30/2022 2200 by Karissa Ware RN  Safety Promotion/Fall Prevention: safety round/check completed  Taken 6/30/2022 2000 by Karissa Ware RN  Safety Promotion/Fall Prevention: safety round/check completed  Intervention: Prevent Infection  Recent Flowsheet Documentation  Taken 7/1/2022 0400 by Karissa Ware RN  Infection Prevention: single patient room provided  Taken 7/1/2022 0200 by Karissa Ware RN  Infection Prevention: single patient room provided  Taken 7/1/2022 0000 by Karissa Ware RN  Infection Prevention:   single patient room provided   rest/sleep promoted   hand hygiene promoted   equipment surfaces disinfected   environmental surveillance performed  Taken 6/30/2022 2200 by Karissa Ware RN  Infection Prevention:   single patient room provided   rest/sleep promoted   hand hygiene promoted   equipment surfaces disinfected   environmental surveillance performed  Taken 6/30/2022 2000 by Karissa Ware RN  Infection Prevention:   single patient room provided   rest/sleep promoted   hand hygiene promoted   equipment surfaces disinfected   environmental surveillance performed  Goal: Optimal Comfort and Wellbeing  Outcome: Ongoing, Progressing  Intervention: Provide Person-Centered Care  Recent Flowsheet  Documentation  Taken 6/30/2022 2000 by Karissa Ware, RN  Trust Relationship/Rapport:   care explained   questions answered  Goal: Readiness for Transition of Care  Outcome: Ongoing, Progressing     Problem: Skin Injury Risk Increased  Goal: Skin Health and Integrity  Outcome: Ongoing, Progressing   Goal Outcome Evaluation:

## 2022-07-01 NOTE — PLAN OF CARE
Goal Outcome Evaluation:  Plan of Care Reviewed With: patient           Outcome Evaluation: patient resting in bed, no distress noted.  Patient is wearing 3l Nc, will drop to the 80's when removed.Patient does not wear oxygen at home.  She is starting a new med, Losartan, will continue to monitor.

## 2022-07-01 NOTE — CASE MANAGEMENT/SOCIAL WORK
Continued Stay Note  ZOHAIB Amor     Patient Name: Nieves Mc  MRN: [id number]  Today's Date: 7/1/2022    Admit Date: 6/29/2022     Discharge Plan     Row Name 07/01/22 1151       Plan    Plan D/C plan: Anticipate routine home with family    Patient/Family in Agreement with Plan yes    Plan Comments Anticipate routine home with family when medically ready.                   Expected Discharge Date and Time     Expected Discharge Date Expected Discharge Time    Jul 2, 2022         Phone communication or documentation only - no physical contact with patient or family.      JEAN FALCON RN

## 2022-07-01 NOTE — PROGRESS NOTES
"    Reason for follow-up: CHF, cardiomyopathy     Patient Care Team:  Houston Oro MD as PCP - General (Family Medicine)  Meghann Lerma MD as Consulting Physician (Hematology and Oncology)    Subjective .   Nieves Mc is doing fair today, still short of breath     ROS    Hydromorphone, Morphine, and Propofol    Scheduled Meds:citalopram, 10 mg, Oral, Daily  furosemide, 20 mg, Intravenous, BID  gabapentin, 300 mg, Oral, TID  guaiFENesin, 600 mg, Oral, Q12H  hydroxyurea, 1,000 mg, Oral, BID  insulin glargine, 25 Units, Subcutaneous, Daily  insulin lispro, 0-9 Units, Subcutaneous, 4x Daily With Meals & Nightly  metoprolol succinate XL, 25 mg, Oral, Daily  rivaroxaban, 20 mg, Oral, Daily With Dinner  sodium chloride, 10 mL, Intravenous, Q12H  traZODone, 50 mg, Oral, Nightly      Continuous Infusions:   PRN Meds:.•  acetaminophen **OR** acetaminophen **OR** acetaminophen  •  ALPRAZolam  •  aluminum-magnesium hydroxide-simethicone  •  dextrose  •  dextrose  •  glucagon (human recombinant)  •  guaiFENesin-codeine  •  insulin lispro **AND** insulin lispro  •  melatonin  •  ondansetron **OR** ondansetron  •  oxyCODONE  •  sodium chloride  •  [COMPLETED] Insert peripheral IV **AND** sodium chloride  •  sodium chloride      VITAL SIGNS  Vitals:    06/30/22 1600 06/30/22 2331 07/01/22 0759 07/01/22 1600   BP: 135/84 101/52 129/79 128/81   BP Location: Right arm Right arm Right arm    Patient Position: Sitting Lying Sitting    Pulse: 114 102 110 104   Resp: 22 20 18 18   Temp: 97.9 °F (36.6 °C) 98.2 °F (36.8 °C) 97.3 °F (36.3 °C) 98.3 °F (36.8 °C)   TempSrc: Oral Oral Oral Oral   SpO2: 96% 97% 97% 98%   Weight:       Height:           Flowsheet Rows    Flowsheet Row First Filed Value   Admission Height 162.6 cm (64\") Documented at 06/29/2022 1500   Admission Weight 54.4 kg (120 lb) Documented at 06/29/2022 1500             Physical Exam  VITALS REVIEWED    General:      well developed, in no acute " distress.    Head:      normocephalic and atraumatic.    Eyes:      PERRL/EOM intact, conjunctiva and sclera clear with out nystagmus.    Neck:      no masses, thyromegaly,  trachea central with normal respiratory effort and PMI displaced laterally  Lungs:      Clear  Heart:       Regular rate and rhythm  Msk:      no deformity or scoliosis noted of thoracic or lumbar spine.    Pulses:      pulses normal in all 4 extremities.    Extremities:       No lower extremity edema  Neurologic:      no focal deficits.   alert oriented x3  Skin:      intact without lesions or rashes.    Psych:      alert and cooperative; normal mood and affect; normal attention span and concentration.          LAB RESULTS (LAST 7 DAYS)    CBC  Results from last 7 days   Lab Units 06/29/22  2343 06/29/22  1533   WBC 10*3/mm3 205.90* 201.00*   RBC 10*6/mm3 3.19* 3.21*   HEMOGLOBIN g/dL 8.0* 8.5*   HEMATOCRIT % 25.7* 25.7*   MCV fL 80.6 80.1   PLATELETS 10*3/mm3 291 293       BMP  Results from last 7 days   Lab Units 06/29/22  2343 06/29/22  1533   SODIUM mmol/L 133* 134*   POTASSIUM mmol/L 3.4* 3.9   CHLORIDE mmol/L 94* 97*   CO2 mmol/L 24.0 23.0   BUN mg/dL 10 8   CREATININE mg/dL 0.65 0.60   GLUCOSE mg/dL 325* 275*       CMP   Results from last 7 days   Lab Units 06/29/22  2343 06/29/22  1533   SODIUM mmol/L 133* 134*   POTASSIUM mmol/L 3.4* 3.9   CHLORIDE mmol/L 94* 97*   CO2 mmol/L 24.0 23.0   BUN mg/dL 10 8   CREATININE mg/dL 0.65 0.60   GLUCOSE mg/dL 325* 275*   ALBUMIN g/dL  --  3.50   BILIRUBIN mg/dL  --  1.4*   ALK PHOS U/L  --  985*   AST (SGOT) U/L  --  22   ALT (SGPT) U/L  --  14   LIPASE U/L  --  10*         BNP        TROPONIN  Results from last 7 days   Lab Units 06/29/22  2343 06/29/22  1533   CK TOTAL U/L 18*  --    TROPONIN T ng/mL  --  <0.010       CoAg  Results from last 7 days   Lab Units 06/29/22  1533   INR  1.22*   APTT seconds 35.7*       Creatinine Clearance  Estimated Creatinine Clearance: 98 mL/min (by C-G formula  based on SCr of 0.65 mg/dL).    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  Patient also has signs and symptoms of CHF with worsening shortness of breath and elevated BNP at 13,000 on admission.  Chest x-ray showed enlarged cardiac silhouette.  Echocardiogram was done which showed mildly dilated left ventricle with moderately reduced ejection fraction of 40 to 45%.  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  She was started on Toprol as well as losartan, also receiving IV Lasix.  Once she recovers from her acute leukemic phase, we will consider ischemic work-up as well.    I discussed the patients findings and my recommendations with patient and agrees with the outlined plan.    Hamida Feldman MD  07/01/22  16:38 EDT

## 2022-07-02 LAB
ANISOCYTOSIS BLD QL: ABNORMAL
BASOPHILS # BLD MANUAL: 15.24 10*3/MM3 (ref 0–0.2)
BASOPHILS NFR BLD MANUAL: 15 % (ref 0–1.5)
BLASTS NFR BLD MANUAL: 11 % (ref 0–0)
DEPRECATED RDW RBC AUTO: 62.1 FL (ref 37–54)
EOSINOPHIL # BLD MANUAL: 8.13 10*3/MM3 (ref 0–0.4)
EOSINOPHIL NFR BLD MANUAL: 8 % (ref 0.3–6.2)
ERYTHROCYTE [DISTWIDTH] IN BLOOD BY AUTOMATED COUNT: 22.2 % (ref 12.3–15.4)
GIANT PLATELETS: ABNORMAL
GLUCOSE BLDC GLUCOMTR-MCNC: 273 MG/DL (ref 70–105)
GLUCOSE BLDC GLUCOMTR-MCNC: 276 MG/DL (ref 70–105)
GLUCOSE BLDC GLUCOMTR-MCNC: 304 MG/DL (ref 70–105)
GLUCOSE BLDC GLUCOMTR-MCNC: 373 MG/DL (ref 70–105)
HCT VFR BLD AUTO: 25.3 % (ref 34–46.6)
HGB BLD-MCNC: 7.6 G/DL (ref 12–15.9)
LYMPHOCYTES # BLD MANUAL: 4.06 10*3/MM3 (ref 0.7–3.1)
LYMPHOCYTES NFR BLD MANUAL: 3 % (ref 5–12)
MCH RBC QN AUTO: 24.3 PG (ref 26.6–33)
MCHC RBC AUTO-ENTMCNC: 29.9 G/DL (ref 31.5–35.7)
MCV RBC AUTO: 81.1 FL (ref 79–97)
METAMYELOCYTES NFR BLD MANUAL: 4 % (ref 0–0)
MONOCYTES # BLD: 3.05 10*3/MM3 (ref 0.1–0.9)
MYELOCYTES NFR BLD MANUAL: 1 % (ref 0–0)
NEUTROPHILS # BLD AUTO: 51.82 10*3/MM3 (ref 1.7–7)
NEUTROPHILS NFR BLD MANUAL: 37 % (ref 42.7–76)
NEUTS BAND NFR BLD MANUAL: 14 % (ref 0–5)
NRBC SPEC MANUAL: 2 /100 WBC (ref 0–0.2)
PLATELET # BLD AUTO: 222 10*3/MM3 (ref 140–450)
PMV BLD AUTO: 7.4 FL (ref 6–12)
PROMYELOCYTES NFR BLD MANUAL: 3 % (ref 0–0)
RBC # BLD AUTO: 3.12 10*6/MM3 (ref 3.77–5.28)
SCAN SLIDE: NORMAL
VARIANT LYMPHS NFR BLD MANUAL: 1 % (ref 0–5)
VARIANT LYMPHS NFR BLD MANUAL: 3 % (ref 19.6–45.3)
WBC MORPH BLD: NORMAL
WBC NRBC COR # BLD: 101.6 10*3/MM3 (ref 3.4–10.8)

## 2022-07-02 PROCEDURE — 25010000002 FUROSEMIDE PER 20 MG: Performed by: INTERNAL MEDICINE

## 2022-07-02 PROCEDURE — 63710000001 INSULIN GLARGINE PER 5 UNITS: Performed by: HOSPITALIST

## 2022-07-02 PROCEDURE — 99233 SBSQ HOSP IP/OBS HIGH 50: CPT | Performed by: INTERNAL MEDICINE

## 2022-07-02 PROCEDURE — 85025 COMPLETE CBC W/AUTO DIFF WBC: CPT | Performed by: INTERNAL MEDICINE

## 2022-07-02 PROCEDURE — 82962 GLUCOSE BLOOD TEST: CPT

## 2022-07-02 PROCEDURE — 85007 BL SMEAR W/DIFF WBC COUNT: CPT | Performed by: INTERNAL MEDICINE

## 2022-07-02 PROCEDURE — 63710000001 INSULIN LISPRO (HUMAN) PER 5 UNITS: Performed by: HOSPITALIST

## 2022-07-02 PROCEDURE — 99232 SBSQ HOSP IP/OBS MODERATE 35: CPT | Performed by: INTERNAL MEDICINE

## 2022-07-02 RX ADMIN — INSULIN LISPRO 6 UNITS: 100 INJECTION, SOLUTION INTRAVENOUS; SUBCUTANEOUS at 08:23

## 2022-07-02 RX ADMIN — RIVAROXABAN 20 MG: 20 TABLET, FILM COATED ORAL at 17:29

## 2022-07-02 RX ADMIN — OXYCODONE 10 MG: 5 TABLET ORAL at 20:25

## 2022-07-02 RX ADMIN — FUROSEMIDE 20 MG: 10 INJECTION, SOLUTION INTRAMUSCULAR; INTRAVENOUS at 08:33

## 2022-07-02 RX ADMIN — GABAPENTIN 300 MG: 300 CAPSULE ORAL at 08:22

## 2022-07-02 RX ADMIN — INSULIN LISPRO 7 UNITS: 100 INJECTION, SOLUTION INTRAVENOUS; SUBCUTANEOUS at 20:26

## 2022-07-02 RX ADMIN — Medication 10 ML: at 20:26

## 2022-07-02 RX ADMIN — OXYCODONE 10 MG: 5 TABLET ORAL at 05:25

## 2022-07-02 RX ADMIN — HYDROXYUREA 1000 MG: 500 CAPSULE ORAL at 20:26

## 2022-07-02 RX ADMIN — INSULIN GLARGINE 25 UNITS: 100 INJECTION, SOLUTION SUBCUTANEOUS at 08:27

## 2022-07-02 RX ADMIN — HYDROXYUREA 1000 MG: 500 CAPSULE ORAL at 08:27

## 2022-07-02 RX ADMIN — FUROSEMIDE 20 MG: 10 INJECTION, SOLUTION INTRAMUSCULAR; INTRAVENOUS at 17:29

## 2022-07-02 RX ADMIN — LOSARTAN POTASSIUM 50 MG: 50 TABLET, FILM COATED ORAL at 08:33

## 2022-07-02 RX ADMIN — INSULIN LISPRO 6 UNITS: 100 INJECTION, SOLUTION INTRAVENOUS; SUBCUTANEOUS at 13:20

## 2022-07-02 RX ADMIN — GUAIFENESIN 600 MG: 600 TABLET, EXTENDED RELEASE ORAL at 20:26

## 2022-07-02 RX ADMIN — METOPROLOL SUCCINATE 25 MG: 25 TABLET, FILM COATED, EXTENDED RELEASE ORAL at 08:33

## 2022-07-02 RX ADMIN — GABAPENTIN 300 MG: 300 CAPSULE ORAL at 20:25

## 2022-07-02 RX ADMIN — ALPRAZOLAM 0.25 MG: 0.25 TABLET ORAL at 20:44

## 2022-07-02 RX ADMIN — GUAIFENESIN 600 MG: 600 TABLET, EXTENDED RELEASE ORAL at 08:22

## 2022-07-02 RX ADMIN — Medication 10 ML: at 08:33

## 2022-07-02 RX ADMIN — INSULIN LISPRO 8 UNITS: 100 INJECTION, SOLUTION INTRAVENOUS; SUBCUTANEOUS at 17:28

## 2022-07-02 RX ADMIN — TRAZODONE HYDROCHLORIDE 50 MG: 50 TABLET ORAL at 20:25

## 2022-07-02 RX ADMIN — GABAPENTIN 300 MG: 300 CAPSULE ORAL at 17:29

## 2022-07-02 RX ADMIN — OXYCODONE 10 MG: 5 TABLET ORAL at 14:06

## 2022-07-02 RX ADMIN — CITALOPRAM HYDROBROMIDE 10 MG: 20 TABLET ORAL at 08:21

## 2022-07-02 NOTE — PROGRESS NOTES
Hematology/Oncology Inpatient Progress Note    PATIENT NAME: Nieves Mc  : 1975  MRN: [id number]    CHIEF COMPLAINT: Dyspnea and cough    HISTORY OF PRESENT ILLNESS:      Nieves Mc is a 46 y.o. female who presented to [facility] on 2022 with complaints of worsening shortness of breath, cough,, chest pain for a week.  CT scan shows continued severe infiltrates unchanged from last CT scan.  Patient was also found to be in blast crisis with her history of CML and blasts of 27% on CBC.  Patient was admitted for further evaluation and treatment.     22  Hematology/Oncology was consulted for established patient with CML presenting with 27% blasts on CBC.  Patient is currently on Tasigna twice daily and Hydrea with history of noncompliance with medications and recent office visit with WBC of 200,000 as well as Hgb of 7.7 due to uncontrolled CML.  Patient was continued on Tasigna and Hydrea at office visit.  After presenting yesterday with blast crisis at 27%, her CBC today shows decreased blasts at 17%, and WBC of 205,000.       Subjective   Feels about the same. Continues to cough frequently and has persisted with dyspnea. Sluggish today. Able to eat little but no emesis.     ROS:  Review of Systems   Constitutional: Negative for activity change, appetite change, chills, diaphoresis, fatigue, fever and unexpected weight change.   HENT: Negative for congestion, dental problem, drooling, ear discharge, ear pain, facial swelling, hearing loss, mouth sores, nosebleeds, postnasal drip, rhinorrhea, sinus pressure, sinus pain, sneezing, sore throat, tinnitus, trouble swallowing and voice change.    Eyes: Negative for photophobia, pain, discharge, redness, itching and visual disturbance.   Respiratory: Positive for cough and shortness of breath. Negative for apnea, choking, chest tightness, wheezing and stridor.    Cardiovascular: Negative for chest pain, palpitations and leg swelling.    Gastrointestinal: Negative for abdominal distention, abdominal pain, anal bleeding, blood in stool, constipation, diarrhea, nausea, rectal pain and vomiting.   Endocrine: Negative for cold intolerance, heat intolerance, polydipsia and polyuria.   Genitourinary: Negative for decreased urine volume, difficulty urinating, dysuria, flank pain, frequency, genital sores, hematuria and urgency.   Musculoskeletal: Negative for arthralgias, back pain, gait problem, joint swelling, myalgias, neck pain and neck stiffness.   Skin: Negative for color change, pallor and rash.   Neurological: Negative for dizziness, tremors, seizures, syncope, facial asymmetry, speech difficulty, weakness, light-headedness, numbness and headaches.   Hematological: Negative for adenopathy. Does not bruise/bleed easily.   Psychiatric/Behavioral: Negative for agitation, behavioral problems, confusion, decreased concentration, hallucinations, self-injury, sleep disturbance and suicidal ideas. The patient is not nervous/anxious.       MEDICATIONS:    Scheduled Meds:  citalopram, 10 mg, Oral, Daily  furosemide, 20 mg, Intravenous, BID  gabapentin, 300 mg, Oral, TID  guaiFENesin, 600 mg, Oral, Q12H  hydroxyurea, 1,000 mg, Oral, BID  insulin glargine, 25 Units, Subcutaneous, Daily  insulin lispro, 0-9 Units, Subcutaneous, 4x Daily With Meals & Nightly  losartan, 50 mg, Oral, Q24H  metoprolol succinate XL, 25 mg, Oral, Daily  rivaroxaban, 20 mg, Oral, Daily With Dinner  sodium chloride, 10 mL, Intravenous, Q12H  traZODone, 50 mg, Oral, Nightly       Continuous Infusions:      PRN Meds:  •  acetaminophen **OR** acetaminophen **OR** acetaminophen  •  ALPRAZolam  •  aluminum-magnesium hydroxide-simethicone  •  dextrose  •  dextrose  •  glucagon (human recombinant)  •  guaiFENesin-codeine  •  insulin lispro **AND** insulin lispro  •  melatonin  •  ondansetron **OR** ondansetron  •  oxyCODONE  •  sodium chloride  •  [COMPLETED] Insert peripheral IV **AND**  "sodium chloride  •  sodium chloride     ALLERGIES:    Allergies   Allergen Reactions   • Hydromorphone GI Intolerance     dilaudid   • Morphine Nausea And Vomiting   • Propofol Hives     Previously tolerated being premedicated with diphenhydramine and famotadine     Objective    VITALS:   /68   Pulse 104   Temp 98.5 °F (36.9 °C) (Oral)   Resp 14   Ht 162.6 cm (64\")   Wt 56.3 kg (124 lb 3.2 oz)   LMP 05/25/2022 (Approximate)   SpO2 99%   BMI 21.32 kg/m²     PHYSICAL EXAM: (performed by MD)  Physical Exam  Constitutional:       General: She is not in acute distress.     Appearance: She is not toxic-appearing or diaphoretic.   HENT:      Head: Normocephalic and atraumatic.      Right Ear: External ear normal.      Left Ear: External ear normal.      Nose: Nose normal.      Mouth/Throat:      Mouth: Mucous membranes are moist.      Pharynx: Oropharynx is clear. No oropharyngeal exudate or posterior oropharyngeal erythema.   Eyes:      General: No scleral icterus.        Right eye: No discharge.         Left eye: No discharge.      Conjunctiva/sclera: Conjunctivae normal.      Pupils: Pupils are equal, round, and reactive to light.   Cardiovascular:      Rate and Rhythm: Normal rate and regular rhythm.      Pulses: Normal pulses.      Heart sounds: No murmur heard.    No friction rub. No gallop.   Pulmonary:      Effort: No respiratory distress.      Breath sounds: No stridor. Rales present. No wheezing or rhonchi.      Comments: Breath sounds markedly diminished bilaterally. No wheezing.   Abdominal:      General: Bowel sounds are normal. There is no distension.      Palpations: Abdomen is soft. There is no mass.      Tenderness: There is no abdominal tenderness. There is no right CVA tenderness, left CVA tenderness, guarding or rebound.      Hernia: No hernia is present.      Comments: Protuberant, soft and not tender. The liver and spleen don't appear enlarged.    Musculoskeletal:         General: No " swelling, tenderness, deformity or signs of injury.      Cervical back: No rigidity.      Right lower leg: No edema.      Left lower leg: No edema.   Lymphadenopathy:      Cervical: No cervical adenopathy.   Skin:     Coloration: Skin is not jaundiced.      Findings: No bruising, lesion or rash.   Neurological:      General: No focal deficit present.      Mental Status: She is alert and oriented to person, place, and time.      Cranial Nerves: No cranial nerve deficit.      Motor: No weakness.      Gait: Gait normal.   Psychiatric:         Mood and Affect: Mood normal.         Behavior: Behavior normal.         Thought Content: Thought content normal.         Judgment: Judgment normal.       JAMEL Truong MD performed the physical exam on 7/1/2022 as documented above.     RECENT LABS:  Lab Results (last 24 hours)     Procedure Component Value Units Date/Time    POC Glucose Once [[id number]]  (Abnormal) Collected: 07/02/22 1137    Specimen: Blood Updated: 07/02/22 1138     Glucose 273 mg/dL      Comment: Serial Number: [id number]:  [id number]       Manual Differential [[id number]]  (Abnormal) Collected: 07/02/22 0822    Specimen: Blood Updated: 07/02/22 0938     Neutrophil % 37.0 %      Lymphocyte % 3.0 %      Monocyte % 3.0 %      Eosinophil % 8.0 %      Basophil % 15.0 %      Bands %  14.0 %      Metamyelocyte % 4.0 %      Myelocyte % 1.0 %      Promyelocyte % 3.0 %      Atypical Lymphocyte % 1.0 %      Blasts % 11.0 %      Neutrophils Absolute 51.82 10*3/mm3      Lymphocytes Absolute 4.06 10*3/mm3      Monocytes Absolute 3.05 10*3/mm3      Eosinophils Absolute 8.13 10*3/mm3      Basophils Absolute 15.24 10*3/mm3      nRBC 2.0 /100 WBC      Anisocytosis Mod/2+     WBC Morphology Normal     Giant Platelets Slight/1+    Narrative:      Reviewed by Pathologist within the past 30 days on 06.02.2022.      CBC & Differential [[id number]]  (Abnormal) Collected: 07/02/22 0822    Specimen: Blood Updated: 07/02/22  0938    Narrative:      The following orders were created for panel order CBC & Differential.  Procedure                               Abnormality         Status                     ---------                               -----------         ------                     CBC Auto Differential[[id number]]        Abnormal            Final result               Scan Slide[[id number]]                                       Final result                 Please view results for these tests on the individual orders.    Scan Slide [[id number]] Collected: 07/02/22 0822    Specimen: Blood Updated: 07/02/22 0938     Scan Slide --     Comment: See Manual Differential Results       CBC Auto Differential [[id number]]  (Abnormal) Collected: 07/02/22 0822    Specimen: Blood Updated: 07/02/22 0938     .60 10*3/mm3      RBC 3.12 10*6/mm3      Hemoglobin 7.6 g/dL      Hematocrit 25.3 %      MCV 81.1 fL      MCH 24.3 pg      MCHC 29.9 g/dL      RDW 22.2 %      RDW-SD 62.1 fl      MPV 7.4 fL      Platelets 222 10*3/mm3     Narrative:      The previously reported component NRBC is no longer being reported. Previous result was 0.7 /100 WBC (Reference Range: 0.0-0.2 /100 WBC) on 7/2/2022 at 0908 EDT.    POC Glucose Once [[id number]]  (Abnormal) Collected: 07/02/22 0758    Specimen: Blood Updated: 07/02/22 0759     Glucose 276 mg/dL      Comment: Serial Number: [id number]:  [id number]       POC Glucose Once [[id number]]  (Abnormal) Collected: 07/01/22 2327    Specimen: Blood Updated: 07/01/22 2328     Glucose 277 mg/dL      Comment: Serial Number: [id number]:  [id number]       POC Glucose Once [[id number]]  (Abnormal) Collected: 07/01/22 2059    Specimen: Blood Updated: 07/01/22 2100     Glucose 297 mg/dL      Comment: Serial Number: [id number]:  [id number]       Blood Culture - Blood, Arm, Left [[id number]]  (Normal) Collected: 06/29/22 1625    Specimen: Blood from Arm, Left Updated: 07/01/22 1632     Blood Culture No growth at  2 days    POC Glucose Once [[id number]]  (Abnormal) Collected: 07/01/22 1605    Specimen: Blood Updated: 07/01/22 1606     Glucose 213 mg/dL      Comment: Serial Number: [id number]:  [id number]       Blood Culture - Blood, Arm, Left [[id number]]  (Normal) Collected: 06/29/22 1533    Specimen: Blood from Arm, Left Updated: 07/01/22 1546     Blood Culture No growth at 2 days          IMAGING REVIEWED:  No radiology results for the last day    Assessment & Plan   ASSESSMENT:  1. CML: On hydroxyurea 2 g per day. Continue same. Bone marrow biopsy next week.   2. Lung disease.Continue present treatment. Nilotinib should not be used any more.   3. Discussed with her and her mother    PLAN:  1. As above.     Yifan Truong MD on 7/2/2022 at 12:23 PM.

## 2022-07-02 NOTE — PROGRESS NOTES
Cardiology Progress Note      Admiting Physician:  Braden Huang MD   LOS: 3 days       Reason For Followup:  Nonischemic cardiomyopathy      Subjective:    Interval History:  Seen and examined.  Chart and labs reviewed.  Patient is reporting shortness of breath.    Review of Systems:  A complete review of systems was undertaken with the pertinent cardiovascular findings listed in history of present illness and all other systems being negative.     Assessment & Plan    Impressions:  Nonischemic cardiomyopathy likely secondary to chemotherapy     EF 40 to 45%  Chronic myelogenous leukemia with blast crisis    Recommendations:  Continue diuresis as renal function allows  Continue with guideline directed medical therapy for cardiomyopathy  Monitor rate and rhythm closely.  Monitor renal function and electrolytes.  Fluid restriction        Objective:    Medication Review:   Scheduled Meds:citalopram, 10 mg, Oral, Daily  furosemide, 20 mg, Intravenous, BID  gabapentin, 300 mg, Oral, TID  guaiFENesin, 600 mg, Oral, Q12H  hydroxyurea, 1,000 mg, Oral, BID  insulin glargine, 25 Units, Subcutaneous, Daily  insulin lispro, 0-9 Units, Subcutaneous, 4x Daily With Meals & Nightly  losartan, 50 mg, Oral, Q24H  metoprolol succinate XL, 25 mg, Oral, Daily  rivaroxaban, 20 mg, Oral, Daily With Dinner  sodium chloride, 10 mL, Intravenous, Q12H  traZODone, 50 mg, Oral, Nightly      Continuous Infusions:   PRN Meds:.•  acetaminophen **OR** acetaminophen **OR** acetaminophen  •  ALPRAZolam  •  aluminum-magnesium hydroxide-simethicone  •  dextrose  •  dextrose  •  glucagon (human recombinant)  •  guaiFENesin-codeine  •  insulin lispro **AND** insulin lispro  •  melatonin  •  ondansetron **OR** ondansetron  •  oxyCODONE  •  sodium chloride  •  [COMPLETED] Insert peripheral IV **AND** sodium chloride  •  sodium chloride    Patient Active Problem List   Diagnosis   • Leukemia not having achieved remission (HCC)   • CML (chronic  myelocytic leukemia) (Formerly [facility])   • Depression with anxiety   • Right knee pain   • Left leg pain   • Severe anemia   • History of pulmonary embolism   • Medically noncompliant   • Visual changes   • Type 2 diabetes mellitus with diabetic polyneuropathy, with long-term current use of insulin (Formerly [facility])   • Vitamin D deficiency   • Shortness of breath   • COVID-19 virus infection   • Dyspnea   • Tachycardia   • CML (chronic myeloid leukemia) with failed remission (Formerly [facility])   • Moderate malnutrition ([facility]ly [facility])   • Atypical pneumonia   • 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  No rub or gallop  Abdomen: Soft, non-tender, nondistended bowel sounds active  Extremities: No cyanosis, clubbing, or edema  Pulses: 2+ and symmetric all extremities  Skin:  No rashes or lesions  Neuro/psych: A&O x3. CN II through XII are grossly intact with appropriate affect    Vital Signs:  Vitals:    07/01/22 2330 07/02/22 0513 07/02/22 0700 07/02/22 0900   BP: 111/66  132/67 115/68   BP Location: Right arm  Left arm    Patient Position: Lying      Pulse: 99  114 104   Resp: 16  14    Temp: 97.2 °F (36.2 °C)  98.3 °F (36.8 °C) 98.5 °F (36.9 °C)   TempSrc: Oral   Oral   SpO2: 92%  99% 99%   Weight:  56.3 kg (124 lb 3.2 oz)     Height:         Wt Readings from Last 1 Encounters:   07/02/22 56.3 kg (124 lb 3.2 oz)       Intake/Output Summary (Last 24 hours) at 7/2/2022 1129  Last data filed at 7/1/2022 1755  Gross per 24 hour   Intake 960 ml   Output --   Net 960 ml         Results Review:      CBC    Results from last 7 days   Lab Units 07/02/22  0822 06/29/22  2343 06/29/22  1533   WBC 10*3/mm3 101.60* 205.90* 201.00*   HEMOGLOBIN g/dL 7.6* 8.0* 8.5*   PLATELETS 10*3/mm3 222 291 293     Cr Clearance Estimated Creatinine Clearance: 96.1 mL/min (by C-G formula based on SCr of 0.65 mg/dL).  Coag   Results from last 7 days   Lab Units 06/29/22  1533   INR  1.22*   APTT seconds 35.7*     HbA1C   Lab Results   Component Value Date    HGBA1C 8.4 (H) 06/29/2022    HGBA1C 10.2 (H) 06/02/2022    HGBA1C 10.8 (H) 01/13/2022     Blood Glucose   Glucose   Date/Time Value Ref Range Status   07/02/2022 0758 276 (H) 70 - 105 mg/dL Final     Comment:     Serial Number: [id number]:  [id number]   07/01/2022 2327 277 (H) 70 - 105 mg/dL Final     Comment:     Serial Number: [id number]:  [id number]   07/01/2022 2059 297 (H) 70 - 105 mg/dL Final     Comment:     Serial Number: [id number]:  [id number]   07/01/2022 1605 213 (H) 70 - 105 mg/dL Final     Comment:     Serial Number: [id number]:  [id number]   07/01/2022 1108 234 (H) 70 - 105 mg/dL Final     Comment:     Serial Number: [id number]:  [id number]   07/01/2022 0739 273 (H) 70 - 105 mg/dL Final     Comment:     Serial Number: [id number]:  [id number]   06/30/2022 1607 277 (H) 70 - 105 mg/dL Final     Comment:     Serial Number: [id number]:  [id number]   06/30/2022 1258 192 (H) 70 - 105 mg/dL Final     Comment:     Serial Number: [id number]:  [id number]     Infection   Results from last 7 days   Lab Units 06/29/22  1625 06/29/22  1533   BLOODCX  No growth at 2 days No growth at 2 days     CMP   Results from last 7 days   Lab Units 06/29/22  2343 06/29/22  1533   SODIUM mmol/L 133* 134*   POTASSIUM mmol/L 3.4* 3.9   CHLORIDE mmol/L 94* 97*   CO2 mmol/L 24.0 23.0   BUN mg/dL 10 8   CREATININE mg/dL 0.65 0.60   GLUCOSE mg/dL 325* 275*   ALBUMIN g/dL  --  3.50   BILIRUBIN mg/dL  --  1.4*   ALK PHOS U/L  --  985*   AST (SGOT) U/L   --  22   ALT (SGPT) U/L  --  14   LIPASE U/L  --  10*     ABG    Results from last 7 days   Lab Units 06/29/22  1542   PH, ARTERIAL pH units 7.503*   PCO2, ARTERIAL mm Hg 29.1*   PO2 ART mm Hg 57.1*   O2 SATURATION ART % 92.2*   BASE EXCESS ART mmol/L 0.4     UA    Results from last 7 days   Lab Units 06/29/22  1750   NITRITE UA  Negative   WBC UA /HPF 3-5*   BACTERIA UA /HPF None Seen   SQUAM EPITHEL UA /HPF 3-6*     ELEANOR  No results found for: POCMETH, POCAMPHET, POCBARBITUR, POCBENZO, POCCOCAINE, POCOPIATES, POCOXYCODO, POCPHENCYC, POCPROPOXY, POCTHC, POCTRICYC  Lysis Labs   Results from last 7 days   Lab Units 07/02/22  0822 06/29/22  2343 06/29/22  1533   INR   --   --  1.22*   APTT seconds  --   --  35.7*   HEMOGLOBIN g/dL 7.6* 8.0* 8.5*   PLATELETS 10*3/mm3 222 291 293   CREATININE mg/dL  --  0.65 0.60     Radiology(recent) No radiology results for the last day      Results from last 7 days   Lab Units 06/29/22  2343 06/29/22  1533   CK TOTAL U/L 18*  --    TROPONIN T ng/mL  --  <0.010       Imaging Results (Last 24 Hours)     ** No results found for the last 24 hours. **          Cardiac Studies:  Echo- Results for orders placed during the hospital encounter of 06/29/22    Adult Transthoracic Echo Complete W/ Cont if Necessary Per Protocol    Interpretation Summary  · The left ventricular cavity is mildly dilated.  · Left ventricular wall thickness is consistent with mild concentric hypertrophy.  · Left ventricular ejection fraction appears to be 41 - 45%.  · Left ventricular diastolic function is consistent with (grade Ia w/high LAP) impaired relaxation.    Stress Myoview-  Cath-        Trenton Ruiz DO  07/02/22  11:29 EDT

## 2022-07-02 NOTE — PLAN OF CARE
Problem: Adult Inpatient Plan of Care  Goal: Plan of Care Review  Outcome: Ongoing, Progressing  Flowsheets (Taken 7/2/2022 0344)  Progress: no change  Plan of Care Reviewed With: patient  Goal: Patient-Specific Goal (Individualized)  Outcome: Ongoing, Progressing  Goal: Absence of Hospital-Acquired Illness or Injury  Outcome: Ongoing, Progressing  Intervention: Identify and Manage Fall Risk  Recent Flowsheet Documentation  Taken 7/2/2022 0200 by Chandler Peters RN  Safety Promotion/Fall Prevention: safety round/check completed  Taken 7/2/2022 0000 by Chandler Peters RN  Safety Promotion/Fall Prevention: safety round/check completed  Taken 7/1/2022 2200 by Chandler Peters RN  Safety Promotion/Fall Prevention: safety round/check completed  Taken 7/1/2022 2045 by Chandler Peters RN  Safety Promotion/Fall Prevention:   safety round/check completed   room organization consistent   nonskid shoes/slippers when out of bed   assistive device/personal items within reach  Goal: Optimal Comfort and Wellbeing  Outcome: Ongoing, Progressing  Intervention: Provide Person-Centered Care  Recent Flowsheet Documentation  Taken 7/1/2022 2045 by Chandler Peters, RN  Trust Relationship/Rapport: care explained  Goal: Readiness for Transition of Care  Outcome: Ongoing, Progressing     Problem: Skin Injury Risk Increased  Goal: Skin Health and Integrity  Outcome: Ongoing, Progressing   Goal Outcome Evaluation:  Plan of Care Reviewed With: patient        Progress: no change    Pt resting comfortably throughout the night. No new issues at this time. Will continue to monitor.

## 2022-07-02 NOTE — PLAN OF CARE
Problem: Adult Inpatient Plan of Care  Goal: Plan of Care Review  Outcome: Ongoing, Progressing  Flowsheets (Taken 7/2/2022 1832)  Progress: improving  Plan of Care Reviewed With: patient  Goal: Patient-Specific Goal (Individualized)  Outcome: Ongoing, Progressing  Goal: Absence of Hospital-Acquired Illness or Injury  Outcome: Ongoing, Progressing  Intervention: Identify and Manage Fall Risk  Recent Flowsheet Documentation  Taken 7/2/2022 1800 by Qian Barnes RN  Safety Promotion/Fall Prevention: safety round/check completed  Taken 7/2/2022 1600 by Qian Barnes RN  Safety Promotion/Fall Prevention: safety round/check completed  Taken 7/2/2022 1400 by Qian Barnes RN  Safety Promotion/Fall Prevention: safety round/check completed  Taken 7/2/2022 1200 by Qian Barnes RN  Safety Promotion/Fall Prevention: safety round/check completed  Taken 7/2/2022 1000 by Qian Barnes RN  Safety Promotion/Fall Prevention: safety round/check completed  Taken 7/2/2022 0800 by Qian Barnes RN  Safety Promotion/Fall Prevention: safety round/check completed  Intervention: Prevent Skin Injury  Recent Flowsheet Documentation  Taken 7/2/2022 0800 by Qian Barnes RN  Skin Protection: adhesive use limited  Intervention: Prevent and Manage VTE (Venous Thromboembolism) Risk  Recent Flowsheet Documentation  Taken 7/2/2022 0800 by Qian Barnes RN  Range of Motion: active ROM (range of motion) encouraged  Intervention: Prevent Infection  Recent Flowsheet Documentation  Taken 7/2/2022 1800 by Qian Barnes RN  Infection Prevention: single patient room provided  Taken 7/2/2022 1600 by Qian Barnes RN  Infection Prevention: single patient room provided  Taken 7/2/2022 1400 by Qian Barnes RN  Infection Prevention: single patient room provided  Taken 7/2/2022 1200 by Qian Barnes RN  Infection Prevention: single patient room provided  Taken 7/2/2022 1000 by Qian Barnes RN  Infection  Prevention: single patient room provided  Taken 7/2/2022 0800 by Qian Barnes RN  Infection Prevention: single patient room provided  Goal: Optimal Comfort and Wellbeing  Outcome: Ongoing, Progressing  Intervention: Provide Person-Centered Care  Recent Flowsheet Documentation  Taken 7/2/2022 0800 by Qian Barnes RN  Trust Relationship/Rapport: care explained  Goal: Readiness for Transition of Care  Outcome: Ongoing, Progressing     Problem: Skin Injury Risk Increased  Goal: Skin Health and Integrity  Outcome: Ongoing, Progressing  Intervention: Optimize Skin Protection  Recent Flowsheet Documentation  Taken 7/2/2022 0800 by Qian Barnes RN  Pressure Reduction Techniques: frequent weight shift encouraged  Pressure Reduction Devices: pressure-redistributing mattress utilized  Skin Protection: adhesive use limited   Goal Outcome Evaluation:  Plan of Care Reviewed With: patient        Progress: improving

## 2022-07-03 ENCOUNTER — APPOINTMENT (OUTPATIENT)
Dept: GENERAL RADIOLOGY | Facility: HOSPITAL | Age: 47
End: 2022-07-03

## 2022-07-03 LAB
ALBUMIN SERPL-MCNC: 3.5 G/DL (ref 3.5–5.2)
ALBUMIN/GLOB SERPL: 0.9 G/DL
ALP SERPL-CCNC: 791 U/L (ref 39–117)
ALT SERPL W P-5'-P-CCNC: 9 U/L (ref 1–33)
ANION GAP SERPL CALCULATED.3IONS-SCNC: 13 MMOL/L (ref 5–15)
ARTERIAL PATENCY WRIST A: POSITIVE
AST SERPL-CCNC: 14 U/L (ref 1–32)
ATMOSPHERIC PRESS: ABNORMAL MM[HG]
BASE EXCESS BLDA CALC-SCNC: 1 MMOL/L (ref 0–3)
BDY SITE: ABNORMAL
BILIRUB SERPL-MCNC: 0.5 MG/DL (ref 0–1.2)
BUN SERPL-MCNC: 28 MG/DL (ref 6–20)
BUN/CREAT SERPL: 50 (ref 7–25)
CALCIUM SPEC-SCNC: 8.9 MG/DL (ref 8.6–10.5)
CHLORIDE SERPL-SCNC: 97 MMOL/L (ref 98–107)
CO2 BLDA-SCNC: 25.7 MMOL/L (ref 22–29)
CO2 SERPL-SCNC: 24 MMOL/L (ref 22–29)
CREAT SERPL-MCNC: 0.56 MG/DL (ref 0.57–1)
EGFRCR SERPLBLD CKD-EPI 2021: 114.1 ML/MIN/1.73
GLOBULIN UR ELPH-MCNC: 3.8 GM/DL
GLUCOSE BLDC GLUCOMTR-MCNC: 231 MG/DL (ref 70–105)
GLUCOSE BLDC GLUCOMTR-MCNC: 236 MG/DL (ref 70–105)
GLUCOSE BLDC GLUCOMTR-MCNC: 247 MG/DL (ref 70–105)
GLUCOSE BLDC GLUCOMTR-MCNC: 333 MG/DL (ref 70–105)
GLUCOSE SERPL-MCNC: 341 MG/DL (ref 65–99)
HCO3 BLDA-SCNC: 24.7 MMOL/L (ref 21–28)
HCT VFR BLD AUTO: 25.6 % (ref 34–46.6)
HEMODILUTION: NO
HGB BLD-MCNC: 7.6 G/DL (ref 12–15.9)
INHALED O2 CONCENTRATION: 32 %
LDH SERPL-CCNC: 1023 U/L (ref 135–214)
MODALITY: ABNORMAL
PCO2 BLDA: 34.3 MM HG (ref 35–48)
PH BLDA: 7.46 PH UNITS (ref 7.35–7.45)
PO2 BLDA: 48.2 MM HG (ref 83–108)
POTASSIUM SERPL-SCNC: 4.8 MMOL/L (ref 3.5–5.2)
PROT SERPL-MCNC: 7.3 G/DL (ref 6–8.5)
QT INTERVAL: 332 MS
SAO2 % BLDCOA: 86.3 % (ref 94–98)
SODIUM SERPL-SCNC: 134 MMOL/L (ref 136–145)
URATE SERPL-MCNC: 12.4 MG/DL (ref 2.4–5.7)

## 2022-07-03 PROCEDURE — 99232 SBSQ HOSP IP/OBS MODERATE 35: CPT | Performed by: INTERNAL MEDICINE

## 2022-07-03 PROCEDURE — 36600 WITHDRAWAL OF ARTERIAL BLOOD: CPT

## 2022-07-03 PROCEDURE — 85018 HEMOGLOBIN: CPT | Performed by: INTERNAL MEDICINE

## 2022-07-03 PROCEDURE — 85014 HEMATOCRIT: CPT | Performed by: INTERNAL MEDICINE

## 2022-07-03 PROCEDURE — 84550 ASSAY OF BLOOD/URIC ACID: CPT | Performed by: INTERNAL MEDICINE

## 2022-07-03 PROCEDURE — 25010000002 FUROSEMIDE PER 20 MG: Performed by: INTERNAL MEDICINE

## 2022-07-03 PROCEDURE — 63710000001 INSULIN GLARGINE PER 5 UNITS: Performed by: HOSPITALIST

## 2022-07-03 PROCEDURE — 71045 X-RAY EXAM CHEST 1 VIEW: CPT

## 2022-07-03 PROCEDURE — 99233 SBSQ HOSP IP/OBS HIGH 50: CPT | Performed by: INTERNAL MEDICINE

## 2022-07-03 PROCEDURE — 63710000001 INSULIN LISPRO (HUMAN) PER 5 UNITS: Performed by: INTERNAL MEDICINE

## 2022-07-03 PROCEDURE — 63710000001 INSULIN LISPRO (HUMAN) PER 5 UNITS: Performed by: HOSPITALIST

## 2022-07-03 PROCEDURE — 80053 COMPREHEN METABOLIC PANEL: CPT | Performed by: INTERNAL MEDICINE

## 2022-07-03 PROCEDURE — 63710000001 ONDANSETRON PER 8 MG: Performed by: HOSPITALIST

## 2022-07-03 PROCEDURE — 94799 UNLISTED PULMONARY SVC/PX: CPT

## 2022-07-03 PROCEDURE — 82803 BLOOD GASES ANY COMBINATION: CPT

## 2022-07-03 PROCEDURE — 82962 GLUCOSE BLOOD TEST: CPT

## 2022-07-03 PROCEDURE — 83615 LACTATE (LD) (LDH) ENZYME: CPT | Performed by: INTERNAL MEDICINE

## 2022-07-03 RX ORDER — NICOTINE POLACRILEX 4 MG
15 LOZENGE BUCCAL
Status: DISCONTINUED | OUTPATIENT
Start: 2022-07-03 | End: 2022-07-06

## 2022-07-03 RX ORDER — INSULIN LISPRO 100 [IU]/ML
0-24 INJECTION, SOLUTION INTRAVENOUS; SUBCUTANEOUS
Status: DISCONTINUED | OUTPATIENT
Start: 2022-07-03 | End: 2022-07-04

## 2022-07-03 RX ORDER — INSULIN LISPRO 100 [IU]/ML
0-24 INJECTION, SOLUTION INTRAVENOUS; SUBCUTANEOUS AS NEEDED
Status: DISCONTINUED | OUTPATIENT
Start: 2022-07-03 | End: 2022-07-04

## 2022-07-03 RX ORDER — DEXTROSE MONOHYDRATE 25 G/50ML
25 INJECTION, SOLUTION INTRAVENOUS
Status: DISCONTINUED | OUTPATIENT
Start: 2022-07-03 | End: 2022-07-06

## 2022-07-03 RX ORDER — OLANZAPINE 10 MG/2ML
1 INJECTION, POWDER, LYOPHILIZED, FOR SOLUTION INTRAMUSCULAR
Status: DISCONTINUED | OUTPATIENT
Start: 2022-07-03 | End: 2022-07-06

## 2022-07-03 RX ADMIN — GABAPENTIN 300 MG: 300 CAPSULE ORAL at 08:27

## 2022-07-03 RX ADMIN — Medication 10 ML: at 08:27

## 2022-07-03 RX ADMIN — INSULIN LISPRO 7 UNITS: 100 INJECTION, SOLUTION INTRAVENOUS; SUBCUTANEOUS at 17:29

## 2022-07-03 RX ADMIN — INSULIN LISPRO 9 UNITS: 100 INJECTION, SOLUTION INTRAVENOUS; SUBCUTANEOUS at 18:32

## 2022-07-03 RX ADMIN — LOSARTAN POTASSIUM 50 MG: 50 TABLET, FILM COATED ORAL at 08:27

## 2022-07-03 RX ADMIN — METOPROLOL SUCCINATE 25 MG: 25 TABLET, FILM COATED, EXTENDED RELEASE ORAL at 08:27

## 2022-07-03 RX ADMIN — RIVAROXABAN 20 MG: 20 TABLET, FILM COATED ORAL at 17:01

## 2022-07-03 RX ADMIN — FUROSEMIDE 20 MG: 10 INJECTION, SOLUTION INTRAMUSCULAR; INTRAVENOUS at 17:01

## 2022-07-03 RX ADMIN — INSULIN LISPRO 4 UNITS: 100 INJECTION, SOLUTION INTRAVENOUS; SUBCUTANEOUS at 08:26

## 2022-07-03 RX ADMIN — FUROSEMIDE 20 MG: 10 INJECTION, SOLUTION INTRAMUSCULAR; INTRAVENOUS at 08:27

## 2022-07-03 RX ADMIN — GUAIFENESIN 600 MG: 600 TABLET, EXTENDED RELEASE ORAL at 20:44

## 2022-07-03 RX ADMIN — HYDROXYUREA 1000 MG: 500 CAPSULE ORAL at 08:36

## 2022-07-03 RX ADMIN — GUAIFENESIN 600 MG: 600 TABLET, EXTENDED RELEASE ORAL at 08:27

## 2022-07-03 RX ADMIN — Medication 10 ML: at 20:46

## 2022-07-03 RX ADMIN — OXYCODONE 10 MG: 5 TABLET ORAL at 16:43

## 2022-07-03 RX ADMIN — GABAPENTIN 300 MG: 300 CAPSULE ORAL at 20:44

## 2022-07-03 RX ADMIN — ALPRAZOLAM 0.25 MG: 0.25 TABLET ORAL at 20:44

## 2022-07-03 RX ADMIN — CITALOPRAM HYDROBROMIDE 10 MG: 20 TABLET ORAL at 08:27

## 2022-07-03 RX ADMIN — INSULIN LISPRO 4 UNITS: 100 INJECTION, SOLUTION INTRAVENOUS; SUBCUTANEOUS at 12:43

## 2022-07-03 RX ADMIN — HYDROXYUREA 1000 MG: 500 CAPSULE ORAL at 20:43

## 2022-07-03 RX ADMIN — GABAPENTIN 300 MG: 300 CAPSULE ORAL at 17:01

## 2022-07-03 RX ADMIN — INSULIN GLARGINE 25 UNITS: 100 INJECTION, SOLUTION SUBCUTANEOUS at 08:36

## 2022-07-03 RX ADMIN — ONDANSETRON HYDROCHLORIDE 4 MG: 4 TABLET, FILM COATED ORAL at 12:38

## 2022-07-03 RX ADMIN — TRAZODONE HYDROCHLORIDE 50 MG: 50 TABLET ORAL at 20:44

## 2022-07-03 RX ADMIN — OXYCODONE 10 MG: 5 TABLET ORAL at 08:26

## 2022-07-03 NOTE — PROGRESS NOTES
[facility] Medicine Services Daily Progress Note    Patient Name: Nieves Mc  : 1975  MRN: [id number]  Primary Care Physician:  Houston Oro MD  Date of admission: 2022      Subjective      Chief Complaint: Shortness of breath.      Patient Reports:            2022.  Patient was seen and examined.  Patient complains of musculoskeletal pain.  Patient complained of cough and generalized weakness.        2022.  Patient was seen and examined.  Patient reported slight improvement in her symptoms.  No overnight events noted.          2022.  Patient was seen and examined.  Patient reported no overnight events.        7/3/2022  Patient was seen and examined.  Patient reported moderate improvement in pain control.  Patient complained of shortness of breath.          Review of Systems   HENT: Negative.    Eyes: Negative.    Cardiovascular: Negative.    Respiratory: Positive for shortness of breath.    Endocrine: Negative.    Hematologic/Lymphatic: Negative.    Skin: Negative.    Musculoskeletal: Negative for muscle cramps.   Gastrointestinal: Negative.    Genitourinary: Negative.    Neurological: Positive for weakness.   Psychiatric/Behavioral: Negative.    Allergic/Immunologic: Negative.             Objective      Vitals:   Temp:  [98.2 °F (36.8 °C)-98.6 °F (37 °C)] 98.2 °F (36.8 °C)  Heart Rate:  [] 102  Resp:  [16-18] 16  BP: (117-125)/(71-79) 125/71  Flow (L/min):  [3] 3    Physical Exam  Vitals and nursing note reviewed.   Constitutional:       General: She is not in acute distress.     Appearance: She is ill-appearing.   HENT:      Head: Normocephalic and atraumatic.      Nose: Nose normal. No congestion or rhinorrhea.      Mouth/Throat:      Mouth: Mucous membranes are moist.      Pharynx: Oropharynx is clear. No oropharyngeal exudate or posterior oropharyngeal erythema.   Eyes:      Pupils: Pupils are equal, round, and reactive to light.    Cardiovascular:      Pulses: Normal pulses.      Heart sounds: Normal heart sounds. No murmur heard.    No friction rub. No gallop.      Comments: S1 and S2 present.  Positive tachycardia.  Pulmonary:      Effort: No respiratory distress.      Breath sounds: No wheezing or rhonchi.      Comments: Improved air entry bilaterally.  Chest:      Chest wall: No tenderness.   Abdominal:      General: Abdomen is flat. Bowel sounds are normal. There is no distension.      Palpations: Abdomen is soft.      Tenderness: There is no right CVA tenderness.   Musculoskeletal:         General: No swelling, tenderness, deformity or signs of injury.      Cervical back: No tenderness.      Right lower leg: No edema.      Left lower leg: No edema.   Skin:     Capillary Refill: Capillary refill takes less than 2 seconds.      Coloration: Skin is not jaundiced.      Findings: No bruising, lesion or rash.   Neurological:      Mental Status: She is alert.      Comments: No facial asymmetry noted.  Gait and station not tested.   Psychiatric:         Mood and Affect: Mood normal.         Behavior: Behavior normal.         Thought Content: Thought content normal.         Judgment: Judgment normal.               Result Review    Result Review:  I have personally reviewed the results from the time of this admission to 7/3/2022 15:44 EDT and agree with these findings:  [x]  Laboratory  [x]  Microbiology  [x]  Radiology  []  EKG/Telemetry   []  Cardiology/Vascular   []  Pathology  []  Old records  []  Other:  Most notable findings include:           Assessment & Plan    From previous notes and with minor updates.  Brief Patient Summary:    Patient is a 46 y.o. female with past medical history of history of pulmonary embolism, type 2 diabetes mellitus, CML, depression with anxiety, hypertension, chronic pain syndrome, COVID-19 virus infection who presented to [facility] on 6/29/2022 complaining of shortness of breath and chest  pain.  Patient reports having worsening shortness of breath and cough, particularly over the last week to week and a half.  She mentioned associated pleuritic chest pain and persistent hot flashes.  She reports she has been trying to take her medications as prescribed. However, when she tries to eat or drink, it just comes right back up.  She states her symptoms are similar to when she was last in the hospital. Of note, patient recently hospitalized from 6/1/2022-6/10/2022 for management of acute hypoxic respiratory failure in the setting of TRALI and CML.  She is on nilotinib (Tasigna) for her CML, but states it has not been working. She is scheduled to f/u with oncology in the clinic tomorrow for follow-up, but her symptoms were too bad so she came to the hospital for further assessment.  Patient was diagnosed with acute systolic heart failure and a blast crisis.  Cardiology consult completed.  Hematology/oncology consult was completed.         citalopram, 10 mg, Oral, Daily  furosemide, 20 mg, Intravenous, BID  gabapentin, 300 mg, Oral, TID  guaiFENesin, 600 mg, Oral, Q12H  hydroxyurea, 1,000 mg, Oral, BID  insulin glargine, 25 Units, Subcutaneous, Daily  insulin lispro, 0-9 Units, Subcutaneous, 4x Daily With Meals & Nightly  losartan, 50 mg, Oral, Q24H  metoprolol succinate XL, 25 mg, Oral, Daily  rivaroxaban, 20 mg, Oral, Daily With Dinner  sodium chloride, 10 mL, Intravenous, Q12H  traZODone, 50 mg, Oral, Nightly             Active Hospital Problems:  Active Hospital Problems    Diagnosis    • Acute systolic CHF (congestive heart failure) (HCC)  Treat with Lasix.  Cardiology is following.        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      Shortness of breath.  Treat with oxygen therapy as needed.  Completed chest x-ray.  Complete an ABG.      Hypertension.  Treat with losartan.    Pulmonary embolism.  Treat with Xarelto.        • Moderate  malnutrition (HCC)  Completes nutrition consult.    Chronic pain syndrome.  Treat with pain control, Roxicodone as needed.      • Type 2 diabetes mellitus with diabetic polyneuropathy, with long-term current use of insulin (HCC)  Treat with insulin therapy.      • Depression with anxiety  Treat with citalopram.      • History of pulmonary embolism  Continue anticoagulation.        • CML (chronic myelocytic leukemia) ([facility])  Follow oncology recommendations.            Continue appropriate patient's home medications for other chronic medical conditions.  Continue the present level of care.  Patient and family agreed with the plan of care.      DVT prophylaxis:  Medical DVT prophylaxis orders are present.    CODE STATUS:    Code Status (Patient has no pulse and is not breathing): CPR (Attempt to Resuscitate)  Medical Interventions (Patient has pulse or is breathing): Full Support      Disposition: Pending patient's clinical improvement.    This patient has been examined wearing appropriate Personal Protective Equipment and discussed with hospital infection control department, [facility] department, infectious disease specialist and pulmonologist. 07/03/22      Electronically signed by Braden Huang MD, FACP, 07/03/22, 15:44 EDT.      [facility] Hospitalist Team

## 2022-07-03 NOTE — PROGRESS NOTES
Cardiology Progress Note      Admiting Physician:  Braden Huang MD   LOS: 4 days       Reason For Followup:  Nonischemic cardiomyopathy      Subjective:    Interval History:  Seen and examined.  Chart and labs reviewed.  Does not feel well today.  Patient reports some nausea today.  No chest discomfort.  Lots of fruit at bedside.  Discussed fruit contribution to daily fluid allowance.    Review of Systems:  A complete review of systems was undertaken with the pertinent cardiovascular findings listed in history of present illness and all other systems being negative.     Assessment & Plan    Impressions:  Nonischemic cardiomyopathy likely secondary to chemotherapy     EF 40 to 45%  Chronic myelogenous leukemia with blast crisis  Nausea    Recommendations:  Continue diuresis as renal function allows  Continue with guideline directed medical therapy for cardiomyopathy  Monitor rate and rhythm closely.  Monitor renal function and electrolytes.  Fluid restriction      Objective:    Medication Review:   Scheduled Meds:citalopram, 10 mg, Oral, Daily  furosemide, 20 mg, Intravenous, BID  gabapentin, 300 mg, Oral, TID  guaiFENesin, 600 mg, Oral, Q12H  hydroxyurea, 1,000 mg, Oral, BID  insulin glargine, 25 Units, Subcutaneous, Daily  insulin lispro, 0-9 Units, Subcutaneous, 4x Daily With Meals & Nightly  losartan, 50 mg, Oral, Q24H  metoprolol succinate XL, 25 mg, Oral, Daily  rivaroxaban, 20 mg, Oral, Daily With Dinner  sodium chloride, 10 mL, Intravenous, Q12H  traZODone, 50 mg, Oral, Nightly      Continuous Infusions:   PRN Meds:.•  acetaminophen **OR** acetaminophen **OR** acetaminophen  •  ALPRAZolam  •  aluminum-magnesium hydroxide-simethicone  •  dextrose  •  dextrose  •  glucagon (human recombinant)  •  guaiFENesin-codeine  •  insulin lispro **AND** insulin lispro  •  melatonin  •  ondansetron **OR** ondansetron  •  oxyCODONE  •  sodium chloride  •  [COMPLETED] Insert peripheral IV **AND** sodium chloride  •   sodium chloride    Patient Active Problem List   Diagnosis   • Leukemia not having achieved remission ([facility])   • CML (chronic myelocytic leukemia) ([facility])   • Depression with anxiety   • Right knee pain   • Left leg pain   • Severe anemia   • History of pulmonary embolism   • Medically noncompliant   • Visual changes   • Type 2 diabetes mellitus with diabetic polyneuropathy, with long-term current use of insulin ([facility])   • Vitamin D deficiency   • Shortness of breath   • COVID-19 virus infection   • Dyspnea   • Tachycardia   • CML (chronic myeloid leukemia) with failed remission ([facility])   • Moderate malnutrition ([facility])   • Atypical pneumonia   • 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 ([facility])   • Acute systolic CHF (congestive heart failure) ([facility])         Physical Exam:    General: Alert, cooperative, no distress, appears stated age  Head:  Normocephalic, atraumatic, mucous membranes moist  Eyes:  Conjunctivae/corneas clear, EOM's intact     Neck:  Supple,  no bruit  Lungs:  Clear to auscultation bilaterally, no wheezes rhonchi rales are noted  Chest wall: No tenderness  Heart::  Regular rate and rhythm, S1 and S2 normal, 1/6 holosystolic murmur.  No rub or gallop  Abdomen: Soft, non-tender, nondistended bowel sounds active  Extremities: No cyanosis, clubbing, or edema  Pulses: 2+ and symmetric all extremities  Skin:  No rashes or lesions  Neuro/psych: A&O x3. CN II through XII are grossly intact with appropriate affect    Vital Signs:  Vitals:    07/02/22 0900 07/02/22 1700 07/02/22 2300 07/03/22 0708   BP: 115/68 119/78 117/79 125/71   BP Location:   Right arm Right arm   Patient Position:   Lying Lying   Pulse: 104  96 102   Resp:   18 16   Temp: 98.5 °F (36.9 °C) 98.6 °F (37 °C) 98.6 °F (37 °C) 98.2 °F (36.8 °C)   TempSrc: Oral  Oral Oral   SpO2: 99% 98% 99% 100%   Weight:       Height:         Wt Readings from Last 1 Encounters:   07/02/22 56.3 kg (124 lb 3.2 oz)       Intake/Output Summary (Last 24 hours) at  7/3/2022 1145  Last data filed at 7/2/2022 1700  Gross per 24 hour   Intake 200 ml   Output --   Net 200 ml         Results Review:     CBC    Results from last 7 days   Lab Units 07/02/22  0822 06/29/22  2343 06/29/22  1533   WBC 10*3/mm3 101.60* 205.90* 201.00*   HEMOGLOBIN g/dL 7.6* 8.0* 8.5*   PLATELETS 10*3/mm3 222 291 293     Cr Clearance Estimated Creatinine Clearance: 111.6 mL/min (A) (by C-G formula based on SCr of 0.56 mg/dL (L)).  Coag   Results from last 7 days   Lab Units 06/29/22  1533   INR  1.22*   APTT seconds 35.7*     HbA1C   Lab Results   Component Value Date    HGBA1C 8.4 (H) 06/29/2022    HGBA1C 10.2 (H) 06/02/2022    HGBA1C 10.8 (H) 01/13/2022     Blood Glucose   Glucose   Date/Time Value Ref Range Status   07/03/2022 0707 247 (H) 70 - 105 mg/dL Final     Comment:     Serial Number: [id number]:  [id number]   07/02/2022 1938 304 (H) 70 - 105 mg/dL Final     Comment:     Serial Number: [id number]:  [id number]   07/02/2022 1610 373 (H) 70 - 105 mg/dL Final     Comment:     Serial Number: [id number]:  [id number]   07/02/2022 1137 273 (H) 70 - 105 mg/dL Final     Comment:     Serial Number: [id number]:  [id number]   07/02/2022 0758 276 (H) 70 - 105 mg/dL Final     Comment:     Serial Number: [id number]:  [id number]   07/01/2022 2327 277 (H) 70 - 105 mg/dL Final     Comment:     Serial Number: [id number]:  [id number]   07/01/2022 2059 297 (H) 70 - 105 mg/dL Final     Comment:     Serial Number: [id number]:  [id number]   07/01/2022 1605 213 (H) 70 - 105 mg/dL Final     Comment:     Serial Number: [id number]:  [id number]     Infection   Results from last 7 days   Lab Units 06/29/22  1625 06/29/22  1533   BLOODCX  No growth at 3 days No growth at 3 days     CMP   Results from last 7 days   Lab Units 07/03/22  0946 06/29/22  2343 06/29/22  1533   SODIUM mmol/L 134* 133* 134*   POTASSIUM mmol/L 4.8 3.4* 3.9   CHLORIDE mmol/L 97* 94* 97*   CO2 mmol/L 24.0  24.0 23.0   BUN mg/dL 28* 10 8   CREATININE mg/dL 0.56* 0.65 0.60   GLUCOSE mg/dL 341* 325* 275*   ALBUMIN g/dL 3.50  --  3.50   BILIRUBIN mg/dL 0.5  --  1.4*   ALK PHOS U/L 791*  --  985*   AST (SGOT) U/L 14  --  22   ALT (SGPT) U/L 9  --  14   LIPASE U/L  --   --  10*     ABG    Results from last 7 days   Lab Units 06/29/22  1542   PH, ARTERIAL pH units 7.503*   PCO2, ARTERIAL mm Hg 29.1*   PO2 ART mm Hg 57.1*   O2 SATURATION ART % 92.2*   BASE EXCESS ART mmol/L 0.4     UA    Results from last 7 days   Lab Units 06/29/22  1750   NITRITE UA  Negative   WBC UA /HPF 3-5*   BACTERIA UA /HPF None Seen   SQUAM EPITHEL UA /HPF 3-6*     ELEANOR  No results found for: POCMETH, POCAMPHET, POCBARBITUR, POCBENZO, POCCOCAINE, POCOPIATES, POCOXYCODO, POCPHENCYC, POCPROPOXY, POCTHC, POCTRICYC  Lysis Labs   Results from last 7 days   Lab Units 07/03/22  0946 07/02/22  0822 06/29/22  2343 06/29/22  1533   INR   --   --   --  1.22*   APTT seconds  --   --   --  35.7*   HEMOGLOBIN g/dL  --  7.6* 8.0* 8.5*   PLATELETS 10*3/mm3  --  222 291 293   CREATININE mg/dL 0.56*  --  0.65 0.60     Radiology(recent) No radiology results for the last day      Results from last 7 days   Lab Units 06/29/22  2343 06/29/22  1533   CK TOTAL U/L 18*  --    TROPONIN T ng/mL  --  <0.010       Imaging Results (Last 24 Hours)     ** No results found for the last 24 hours. **          Cardiac Studies:  Echo- Results for orders placed during the hospital encounter of 06/29/22    Adult Transthoracic Echo Complete W/ Cont if Necessary Per Protocol    Interpretation Summary  · The left ventricular cavity is mildly dilated.  · Left ventricular wall thickness is consistent with mild concentric hypertrophy.  · Left ventricular ejection fraction appears to be 41 - 45%.  · Left ventricular diastolic function is consistent with (grade Ia w/high LAP) impaired relaxation.    Stress Myoview-  Cath-        Trenton Ruiz DO  07/03/22  11:45 EDT

## 2022-07-03 NOTE — PROGRESS NOTES
Hematology/Oncology Inpatient Progress Note    PATIENT NAME: Nieves Mc  : 1975  MRN: [id number]    CHIEF COMPLAINT: Dyspnea and cough    HISTORY OF PRESENT ILLNESS:      Nieves Mc is a 46 y.o. female who presented to [facility] on 2022 with complaints of worsening shortness of breath, cough,, chest pain for a week.  CT scan shows continued severe infiltrates unchanged from last CT scan.  Patient was also found to be in blast crisis with her history of CML and blasts of 27% on CBC.  Patient was admitted for further evaluation and treatment.     22  Hematology/Oncology was consulted for established patient with CML presenting with 27% blasts on CBC.  Patient is currently on Tasigna twice daily and Hydrea with history of noncompliance with medications and recent office visit with WBC of 200,000 as well as Hgb of 7.7 due to uncontrolled CML.  Patient was continued on Tasigna and Hydrea at office visit.  After presenting yesterday with blast crisis at 27%, her CBC today shows decreased blasts at 17%, and WBC of 205,000.    2022 -WBC 1.1.6, hemoglobin 7.6, platelet 222       Subjective   Ongoing shortness of breath, no new symptoms.    ROS:  Review of Systems   Constitutional: Positive for fatigue. Negative for fever.   HENT: Negative for congestion and nosebleeds.    Eyes: Negative for pain.   Respiratory: Positive for shortness of breath. Negative for cough.    Cardiovascular: Negative for chest pain.   Gastrointestinal: Negative for abdominal pain, blood in stool, diarrhea, nausea and vomiting.   Endocrine: Negative for cold intolerance and heat intolerance.   Genitourinary: Negative for difficulty urinating.   Musculoskeletal: Negative for arthralgias.   Skin: Negative for rash.   Neurological: Negative for dizziness and headaches.   Hematological: Does not bruise/bleed easily.   Psychiatric/Behavioral: Negative for behavioral problems.      MEDICATIONS:    Scheduled  "Meds:  citalopram, 10 mg, Oral, Daily  furosemide, 20 mg, Intravenous, BID  gabapentin, 300 mg, Oral, TID  guaiFENesin, 600 mg, Oral, Q12H  hydroxyurea, 1,000 mg, Oral, BID  insulin glargine, 25 Units, Subcutaneous, Daily  insulin lispro, 0-9 Units, Subcutaneous, 4x Daily With Meals & Nightly  losartan, 50 mg, Oral, Q24H  metoprolol succinate XL, 25 mg, Oral, Daily  rivaroxaban, 20 mg, Oral, Daily With Dinner  sodium chloride, 10 mL, Intravenous, Q12H  traZODone, 50 mg, Oral, Nightly       Continuous Infusions:      PRN Meds:  •  acetaminophen **OR** acetaminophen **OR** acetaminophen  •  ALPRAZolam  •  aluminum-magnesium hydroxide-simethicone  •  dextrose  •  dextrose  •  glucagon (human recombinant)  •  guaiFENesin-codeine  •  insulin lispro **AND** insulin lispro  •  melatonin  •  ondansetron **OR** ondansetron  •  oxyCODONE  •  sodium chloride  •  [COMPLETED] Insert peripheral IV **AND** sodium chloride  •  sodium chloride     ALLERGIES:    Allergies   Allergen Reactions   • Hydromorphone GI Intolerance     dilaudid   • Morphine Nausea And Vomiting   • Propofol Hives     Previously tolerated being premedicated with diphenhydramine and famotadine     Objective    VITALS:   /71 (BP Location: Right arm, Patient Position: Lying)   Pulse 102   Temp 98.2 °F (36.8 °C) (Oral)   Resp 16   Ht 162.6 cm (64\")   Wt 56.3 kg (124 lb 3.2 oz)   LMP 05/25/2022 (Approximate)   SpO2 100%   BMI 21.32 kg/m²     PHYSICAL EXAM: (performed by MD)  Physical Exam  Constitutional:       Appearance: Normal appearance.   HENT:      Head: Normocephalic and atraumatic.   Eyes:      Pupils: Pupils are equal, round, and reactive to light.   Cardiovascular:      Rate and Rhythm: Normal rate and regular rhythm.      Pulses: Normal pulses.      Heart sounds: No murmur heard.  Pulmonary:      Effort: Pulmonary effort is normal.      Breath sounds: Rhonchi present.   Abdominal:      General: There is distension.      Palpations: Abdomen " is soft. There is no mass.      Tenderness: There is no abdominal tenderness.   Musculoskeletal:         General: Normal range of motion.      Cervical back: Normal range of motion.   Skin:     General: Skin is warm.   Neurological:      General: No focal deficit present.      Mental Status: She is alert.   Psychiatric:         Mood and Affect: Mood normal.         RECENT LABS:  Lab Results (last 24 hours)     Procedure Component Value Units Date/Time    POC Glucose Once [[id number]]  (Abnormal) Collected: 07/03/22 0707    Specimen: Blood Updated: 07/03/22 0707     Glucose 247 mg/dL      Comment: Serial Number: [id number]:  [id number]       POC Glucose Once [[id number]]  (Abnormal) Collected: 07/02/22 1938    Specimen: Blood Updated: 07/02/22 1939     Glucose 304 mg/dL      Comment: Serial Number: [id number]:  [id number]       Blood Culture - Blood, Arm, Left [[id number]]  (Normal) Collected: 06/29/22 1625    Specimen: Blood from Arm, Left Updated: 07/02/22 1632     Blood Culture No growth at 3 days    POC Glucose Once [[id number]]  (Abnormal) Collected: 07/02/22 1610    Specimen: Blood Updated: 07/02/22 1611     Glucose 373 mg/dL      Comment: Serial Number: [id number]:  [id number]       Blood Culture - Blood, Arm, Left [[id number]]  (Normal) Collected: 06/29/22 1533    Specimen: Blood from Arm, Left Updated: 07/02/22 1547     Blood Culture No growth at 3 days    POC Glucose Once [[id number]]  (Abnormal) Collected: 07/02/22 1137    Specimen: Blood Updated: 07/02/22 1138     Glucose 273 mg/dL      Comment: Serial Number: [id number]:  [id number]       Manual Differential [[id number]]  (Abnormal) Collected: 07/02/22 0822    Specimen: Blood Updated: 07/02/22 0938     Neutrophil % 37.0 %      Lymphocyte % 3.0 %      Monocyte % 3.0 %      Eosinophil % 8.0 %      Basophil % 15.0 %      Bands %  14.0 %      Metamyelocyte % 4.0 %      Myelocyte % 1.0 %      Promyelocyte % 3.0 %      Atypical Lymphocyte %  1.0 %      Blasts % 11.0 %      Neutrophils Absolute 51.82 10*3/mm3      Lymphocytes Absolute 4.06 10*3/mm3      Monocytes Absolute 3.05 10*3/mm3      Eosinophils Absolute 8.13 10*3/mm3      Basophils Absolute 15.24 10*3/mm3      nRBC 2.0 /100 WBC      Anisocytosis Mod/2+     WBC Morphology Normal     Giant Platelets Slight/1+    Narrative:      Reviewed by Pathologist within the past 30 days on 06.02.2022.      CBC & Differential [[id number]]  (Abnormal) Collected: 07/02/22 0822    Specimen: Blood Updated: 07/02/22 0938    Narrative:      The following orders were created for panel order CBC & Differential.  Procedure                               Abnormality         Status                     ---------                               -----------         ------                     CBC Auto Differential[[id number]]        Abnormal            Final result               Scan Slide[[id number]]                                       Final result                 Please view results for these tests on the individual orders.    Scan Slide [[id number]] Collected: 07/02/22 0822    Specimen: Blood Updated: 07/02/22 0938     Scan Slide --     Comment: See Manual Differential Results       CBC Auto Differential [[id number]]  (Abnormal) Collected: 07/02/22 0822    Specimen: Blood Updated: 07/02/22 0938     .60 10*3/mm3      RBC 3.12 10*6/mm3      Hemoglobin 7.6 g/dL      Hematocrit 25.3 %      MCV 81.1 fL      MCH 24.3 pg      MCHC 29.9 g/dL      RDW 22.2 %      RDW-SD 62.1 fl      MPV 7.4 fL      Platelets 222 10*3/mm3     Narrative:      The previously reported component NRBC is no longer being reported. Previous result was 0.7 /100 WBC (Reference Range: 0.0-0.2 /100 WBC) on 7/2/2022 at 0908 EDT.          IMAGING REVIEWED:  No radiology results for the last day    Assessment & Plan   ASSESSMENT:  1. CML: On hydroxyurea 2 g per day. Continue same.  White count improved to 101.6, plan for bone marrow biopsy.  We will continue  Hydrea for now.    2. Lung disease.Continue present treatment. Nilotinib should not be used any more.  Plan for different tyrosine kinase inhibitor.  3. Check for tumor lysis labs with uric acid, CMP.    PLAN:  1. As above.

## 2022-07-03 NOTE — PLAN OF CARE
Goal Outcome Evaluation:  Plan of Care Reviewed With: patient        Progress: no change     Pt rested comfortably throughout shift. No new issues addressed

## 2022-07-03 NOTE — PLAN OF CARE
Goal Outcome Evaluation:           Progress: no change  Outcome Evaluation: Pt is on 6L 02 at this time, MD notified that results were in for ABG. Pt continues to have chronic pain treated with PRN oxycodone. Pt remains in bed using her cell phone with call light within reach.

## 2022-07-04 ENCOUNTER — APPOINTMENT (OUTPATIENT)
Dept: ULTRASOUND IMAGING | Facility: HOSPITAL | Age: 47
End: 2022-07-04

## 2022-07-04 PROBLEM — N92.0 MENORRHAGIA: Status: ACTIVE | Noted: 2022-07-04

## 2022-07-04 LAB
ANISOCYTOSIS BLD QL: ABNORMAL
BACTERIA SPEC AEROBE CULT: NORMAL
BACTERIA SPEC AEROBE CULT: NORMAL
BASO STIPL COARSE BLD QL SMEAR: ABNORMAL
BASOPHILS # BLD MANUAL: 9.3 10*3/MM3 (ref 0–0.2)
BASOPHILS NFR BLD MANUAL: 11 % (ref 0–1.5)
BLASTS NFR BLD MANUAL: 10 % (ref 0–0)
DEPRECATED RDW RBC AUTO: 61.3 FL (ref 37–54)
EOSINOPHIL # BLD MANUAL: 4.23 10*3/MM3 (ref 0–0.4)
EOSINOPHIL NFR BLD MANUAL: 5 % (ref 0.3–6.2)
ERYTHROCYTE [DISTWIDTH] IN BLOOD BY AUTOMATED COUNT: 22 % (ref 12.3–15.4)
GLUCOSE BLDC GLUCOMTR-MCNC: 223 MG/DL (ref 70–105)
GLUCOSE BLDC GLUCOMTR-MCNC: 249 MG/DL (ref 70–105)
GLUCOSE BLDC GLUCOMTR-MCNC: 254 MG/DL (ref 70–105)
GLUCOSE BLDC GLUCOMTR-MCNC: 277 MG/DL (ref 70–105)
HCT VFR BLD AUTO: 23.2 % (ref 34–46.6)
HGB BLD-MCNC: 7.2 G/DL (ref 12–15.9)
LARGE PLATELETS: ABNORMAL
LYMPHOCYTES # BLD MANUAL: 0.85 10*3/MM3 (ref 0.7–3.1)
LYMPHOCYTES NFR BLD MANUAL: 4 % (ref 5–12)
MCH RBC QN AUTO: 25.1 PG (ref 26.6–33)
MCHC RBC AUTO-ENTMCNC: 30.9 G/DL (ref 31.5–35.7)
MCV RBC AUTO: 81.1 FL (ref 79–97)
MONOCYTES # BLD: 3.38 10*3/MM3 (ref 0.1–0.9)
NEUTROPHILS # BLD AUTO: 55.77 10*3/MM3 (ref 1.7–7)
NEUTROPHILS NFR BLD MANUAL: 66 % (ref 42.7–76)
PATHOLOGY REVIEW: YES
PLATELET # BLD AUTO: 189 10*3/MM3 (ref 140–450)
PMV BLD AUTO: 7.6 FL (ref 6–12)
POLYCHROMASIA BLD QL SMEAR: ABNORMAL
PROMYELOCYTES NFR BLD MANUAL: 3 % (ref 0–0)
RBC # BLD AUTO: 2.86 10*6/MM3 (ref 3.77–5.28)
SCAN SLIDE: NORMAL
SMALL PLATELETS BLD QL SMEAR: ADEQUATE
VARIANT LYMPHS NFR BLD MANUAL: 1 % (ref 19.6–45.3)
WBC MORPH BLD: NORMAL
WBC NRBC COR # BLD: 84.5 10*3/MM3 (ref 3.4–10.8)

## 2022-07-04 PROCEDURE — 99232 SBSQ HOSP IP/OBS MODERATE 35: CPT | Performed by: INTERNAL MEDICINE

## 2022-07-04 PROCEDURE — 76856 US EXAM PELVIC COMPLETE: CPT

## 2022-07-04 PROCEDURE — 25010000002 FUROSEMIDE PER 20 MG: Performed by: INTERNAL MEDICINE

## 2022-07-04 PROCEDURE — 85007 BL SMEAR W/DIFF WBC COUNT: CPT | Performed by: INTERNAL MEDICINE

## 2022-07-04 PROCEDURE — 76830 TRANSVAGINAL US NON-OB: CPT

## 2022-07-04 PROCEDURE — 99233 SBSQ HOSP IP/OBS HIGH 50: CPT | Performed by: INTERNAL MEDICINE

## 2022-07-04 PROCEDURE — 63710000001 INSULIN LISPRO (HUMAN) PER 5 UNITS: Performed by: INTERNAL MEDICINE

## 2022-07-04 PROCEDURE — 99222 1ST HOSP IP/OBS MODERATE 55: CPT | Performed by: INTERNAL MEDICINE

## 2022-07-04 PROCEDURE — 82962 GLUCOSE BLOOD TEST: CPT

## 2022-07-04 PROCEDURE — 85025 COMPLETE CBC W/AUTO DIFF WBC: CPT | Performed by: INTERNAL MEDICINE

## 2022-07-04 PROCEDURE — 93976 VASCULAR STUDY: CPT

## 2022-07-04 PROCEDURE — 63710000001 INSULIN GLARGINE PER 5 UNITS: Performed by: HOSPITALIST

## 2022-07-04 RX ORDER — ALLOPURINOL 300 MG/1
300 TABLET ORAL DAILY
Status: DISCONTINUED | OUTPATIENT
Start: 2022-07-04 | End: 2022-07-05

## 2022-07-04 RX ORDER — INSULIN LISPRO 100 [IU]/ML
0-12 INJECTION, SOLUTION INTRAVENOUS; SUBCUTANEOUS
Status: DISCONTINUED | OUTPATIENT
Start: 2022-07-05 | End: 2022-07-05

## 2022-07-04 RX ORDER — INSULIN LISPRO 100 [IU]/ML
6 INJECTION, SOLUTION INTRAVENOUS; SUBCUTANEOUS
Status: DISCONTINUED | OUTPATIENT
Start: 2022-07-04 | End: 2022-07-05

## 2022-07-04 RX ADMIN — GABAPENTIN 300 MG: 300 CAPSULE ORAL at 09:50

## 2022-07-04 RX ADMIN — TRAZODONE HYDROCHLORIDE 50 MG: 50 TABLET ORAL at 19:56

## 2022-07-04 RX ADMIN — ALLOPURINOL 300 MG: 300 TABLET ORAL at 12:03

## 2022-07-04 RX ADMIN — INSULIN LISPRO 12 UNITS: 100 INJECTION, SOLUTION INTRAVENOUS; SUBCUTANEOUS at 12:03

## 2022-07-04 RX ADMIN — OXYCODONE 10 MG: 5 TABLET ORAL at 21:14

## 2022-07-04 RX ADMIN — RIVAROXABAN 20 MG: 20 TABLET, FILM COATED ORAL at 18:04

## 2022-07-04 RX ADMIN — GUAIFENESIN 600 MG: 600 TABLET, EXTENDED RELEASE ORAL at 19:56

## 2022-07-04 RX ADMIN — HYDROXYUREA 1000 MG: 500 CAPSULE ORAL at 19:50

## 2022-07-04 RX ADMIN — OXYCODONE 10 MG: 5 TABLET ORAL at 07:52

## 2022-07-04 RX ADMIN — INSULIN GLARGINE 25 UNITS: 100 INJECTION, SOLUTION SUBCUTANEOUS at 09:50

## 2022-07-04 RX ADMIN — HYDROXYUREA 1000 MG: 500 CAPSULE ORAL at 09:50

## 2022-07-04 RX ADMIN — ALPRAZOLAM 0.25 MG: 0.25 TABLET ORAL at 21:14

## 2022-07-04 RX ADMIN — INSULIN LISPRO 8 UNITS: 100 INJECTION, SOLUTION INTRAVENOUS; SUBCUTANEOUS at 07:52

## 2022-07-04 RX ADMIN — GABAPENTIN 300 MG: 300 CAPSULE ORAL at 19:56

## 2022-07-04 RX ADMIN — INSULIN LISPRO 6 UNITS: 100 INJECTION, SOLUTION INTRAVENOUS; SUBCUTANEOUS at 18:22

## 2022-07-04 RX ADMIN — OXYCODONE 10 MG: 5 TABLET ORAL at 12:03

## 2022-07-04 RX ADMIN — FUROSEMIDE 20 MG: 10 INJECTION, SOLUTION INTRAMUSCULAR; INTRAVENOUS at 18:04

## 2022-07-04 RX ADMIN — LOSARTAN POTASSIUM 50 MG: 50 TABLET, FILM COATED ORAL at 09:50

## 2022-07-04 RX ADMIN — Medication 10 ML: at 19:56

## 2022-07-04 RX ADMIN — Medication 10 ML: at 09:50

## 2022-07-04 RX ADMIN — METOPROLOL SUCCINATE 25 MG: 25 TABLET, FILM COATED, EXTENDED RELEASE ORAL at 09:50

## 2022-07-04 RX ADMIN — OXYCODONE 10 MG: 5 TABLET ORAL at 16:27

## 2022-07-04 RX ADMIN — GABAPENTIN 300 MG: 300 CAPSULE ORAL at 16:27

## 2022-07-04 RX ADMIN — GUAIFENESIN 600 MG: 600 TABLET, EXTENDED RELEASE ORAL at 09:50

## 2022-07-04 RX ADMIN — FUROSEMIDE 20 MG: 10 INJECTION, SOLUTION INTRAMUSCULAR; INTRAVENOUS at 09:50

## 2022-07-04 RX ADMIN — CITALOPRAM HYDROBROMIDE 10 MG: 20 TABLET ORAL at 09:50

## 2022-07-04 NOTE — PROGRESS NOTES
Hematology/Oncology Inpatient Progress Note    PATIENT NAME: Nieves Mc  : 1975  MRN: [id number]    CHIEF COMPLAINT: Dyspnea and cough    HISTORY OF PRESENT ILLNESS:      Nieves Mc is a 46 y.o. female who presented to [facility] on 2022 with complaints of worsening shortness of breath, cough,, chest pain for a week.  CT scan shows continued severe infiltrates unchanged from last CT scan.  Patient was also found to be in blast crisis with her history of CML and blasts of 27% on CBC.  Patient was admitted for further evaluation and treatment.     22  Hematology/Oncology was consulted for established patient with CML presenting with 27% blasts on CBC.  Patient is currently on Tasigna twice daily and Hydrea with history of noncompliance with medications and recent office visit with WBC of 200,000 as well as Hgb of 7.7 due to uncontrolled CML.  Patient was continued on Tasigna and Hydrea at office visit.  After presenting yesterday with blast crisis at 27%, her CBC today shows decreased blasts at 17%, and WBC of 205,000.    2022 -.6, hemoglobin 7.6, platelet 222       Subjective   Still has ongoing fatigue, shortness of breath    ROS:  Review of Systems   Constitutional: Positive for fatigue. Negative for appetite change and fever.   HENT: Negative for congestion, ear pain and nosebleeds.    Eyes: Negative for pain.   Respiratory: Positive for shortness of breath. Negative for cough.    Cardiovascular: Negative for chest pain.   Gastrointestinal: Negative for abdominal pain, blood in stool, diarrhea, nausea and vomiting.   Endocrine: Negative for cold intolerance, heat intolerance and polyphagia.   Genitourinary: Negative for difficulty urinating.   Musculoskeletal: Negative for arthralgias.   Skin: Negative for rash.   Neurological: Negative for dizziness and headaches.   Hematological: Does not bruise/bleed easily.   Psychiatric/Behavioral: Negative for behavioral  "problems.      MEDICATIONS:    Scheduled Meds:  citalopram, 10 mg, Oral, Daily  furosemide, 20 mg, Intravenous, BID  gabapentin, 300 mg, Oral, TID  guaiFENesin, 600 mg, Oral, Q12H  hydroxyurea, 1,000 mg, Oral, BID  insulin glargine, 25 Units, Subcutaneous, Daily  insulin lispro, 0-24 Units, Subcutaneous, TID AC  losartan, 50 mg, Oral, Q24H  metoprolol succinate XL, 25 mg, Oral, Daily  rivaroxaban, 20 mg, Oral, Daily With Dinner  sodium chloride, 10 mL, Intravenous, Q12H  traZODone, 50 mg, Oral, Nightly       Continuous Infusions:      PRN Meds:  •  acetaminophen **OR** acetaminophen **OR** acetaminophen  •  ALPRAZolam  •  aluminum-magnesium hydroxide-simethicone  •  dextrose  •  dextrose  •  glucagon (human recombinant)  •  glucagon (human recombinant)  •  guaiFENesin-codeine  •  insulin lispro **AND** insulin lispro  •  melatonin  •  ondansetron **OR** ondansetron  •  oxyCODONE  •  sodium chloride  •  [COMPLETED] Insert peripheral IV **AND** sodium chloride  •  sodium chloride     ALLERGIES:    Allergies   Allergen Reactions   • Hydromorphone GI Intolerance     dilaudid   • Morphine Nausea And Vomiting   • Propofol Hives     Previously tolerated being premedicated with diphenhydramine and famotadine     Objective    VITALS:   /66 (BP Location: Right arm, Patient Position: Sitting)   Pulse 100   Temp 97.7 °F (36.5 °C) (Oral)   Resp 18   Ht 162.6 cm (64\")   Wt 56.3 kg (124 lb 3.2 oz)   LMP 05/25/2022 (Approximate)   SpO2 100%   BMI 21.32 kg/m²     PHYSICAL EXAM: (performed by MD)  Physical Exam  Constitutional:       Appearance: Normal appearance.   HENT:      Head: Normocephalic and atraumatic.   Eyes:      Pupils: Pupils are equal, round, and reactive to light.   Cardiovascular:      Rate and Rhythm: Normal rate and regular rhythm.      Pulses: Normal pulses.      Heart sounds: Normal heart sounds. No murmur heard.  Pulmonary:      Effort: Pulmonary effort is normal.      Breath sounds: Rhonchi " present.   Abdominal:      General: There is distension.      Palpations: Abdomen is soft. There is no mass.      Tenderness: There is no abdominal tenderness.   Musculoskeletal:         General: Normal range of motion.      Cervical back: Normal range of motion and neck supple.   Skin:     General: Skin is warm.   Neurological:      General: No focal deficit present.      Mental Status: She is alert.   Psychiatric:         Mood and Affect: Mood normal.         RECENT LABS:  Lab Results (last 24 hours)     Procedure Component Value Units Date/Time    Path Consult Reflex [[id number]] Collected: 07/04/22 0425    Specimen: Blood Updated: 07/04/22 0826     Pathology Review Yes    Manual Differential [[id number]]  (Abnormal) Collected: 07/04/22 0425    Specimen: Blood Updated: 07/04/22 0826     Neutrophil % 66.0 %      Lymphocyte % 1.0 %      Monocyte % 4.0 %      Eosinophil % 5.0 %      Basophil % 11.0 %      Promyelocyte % 3.0 %      Blasts % 10.0 %      Neutrophils Absolute 55.77 10*3/mm3      Lymphocytes Absolute 0.85 10*3/mm3      Monocytes Absolute 3.38 10*3/mm3      Eosinophils Absolute 4.23 10*3/mm3      Basophils Absolute 9.30 10*3/mm3      Anisocytosis Slight/1+     Basophilic Stippling Slight/1+     Polychromasia Slight/1+     WBC Morphology Normal     Platelet Estimate Adequate     Large Platelets Slight/1+    Narrative:      Basophils >4%    CBC & Differential [[id number]]  (Abnormal) Collected: 07/04/22 0425    Specimen: Blood Updated: 07/04/22 0826    Narrative:      The following orders were created for panel order CBC & Differential.  Procedure                               Abnormality         Status                     ---------                               -----------         ------                     CBC Auto Differential[[id number]]        Abnormal            Final result               Scan Slide[[id number]]                                       Final result                 Please view results for these  tests on the individual orders.    Scan Slide [[id number]] Collected: 07/04/22 0425    Specimen: Blood Updated: 07/04/22 0826     Scan Slide --     Comment: See Manual Differential Results       CBC Auto Differential [[id number]]  (Abnormal) Collected: 07/04/22 0425    Specimen: Blood Updated: 07/04/22 0826     WBC 84.50 10*3/mm3      RBC 2.86 10*6/mm3      Hemoglobin 7.2 g/dL      Hematocrit 23.2 %      MCV 81.1 fL      MCH 25.1 pg      MCHC 30.9 g/dL      RDW 22.0 %      RDW-SD 61.3 fl      MPV 7.6 fL      Platelets 189 10*3/mm3     Narrative:      The previously reported component NRBC is no longer being reported. Previous result was 0.5 /100 WBC (Reference Range: 0.0-0.2 /100 WBC) on 7/4/2022 at 0607 EDT.    POC Glucose Once [[id number]]  (Abnormal) Collected: 07/04/22 0717    Specimen: Blood Updated: 07/04/22 0718     Glucose 223 mg/dL      Comment: Serial Number: [id number]:  [id number]       Hemoglobin & Hematocrit, Blood [[id number]]  (Abnormal) Collected: 07/03/22 1853    Specimen: Blood Updated: 07/03/22 2010     Hemoglobin 7.6 g/dL      Hematocrit 25.6 %     POC Glucose Once [[id number]]  (Abnormal) Collected: 07/03/22 1949    Specimen: Blood Updated: 07/03/22 1950     Glucose 236 mg/dL      Comment: Serial Number: [id number]:  [id number]       Blood Gas, Arterial - [[id number]]  (Abnormal) Collected: 07/03/22 1643    Specimen: Arterial Blood Updated: 07/03/22 1647     Site Right Brachial     Klever's Test Positive     pH, Arterial 7.465 pH units      pCO2, Arterial 34.3 mm Hg      pO2, Arterial 48.2 mm Hg      HCO3, Arterial 24.7 mmol/L      Base Excess, Arterial 1.0 mmol/L      Comment: Serial Number: [id number]:  [id number]        O2 Saturation, Arterial 86.3 %      CO2 Content 25.7 mmol/L      Barometric Pressure for Blood Gas --     Comment: N/A        Modality Cannula     FIO2 32 %      Hemodilution No    POC Glucose Once [[id number]]  (Abnormal) Collected: 07/03/22 0906    Specimen: Blood  Updated: 07/03/22 1644     Glucose 333 mg/dL      Comment: Serial Number: [id number]:  [id number]       Blood Culture - Blood, Arm, Left [[id number]]  (Normal) Collected: 06/29/22 1625    Specimen: Blood from Arm, Left Updated: 07/03/22 1633     Blood Culture No growth at 4 days    Blood Culture - Blood, Arm, Left [[id number]]  (Normal) Collected: 06/29/22 1533    Specimen: Blood from Arm, Left Updated: 07/03/22 1547     Blood Culture No growth at 4 days    POC Glucose Once [[id number]]  (Abnormal) Collected: 07/03/22 1213    Specimen: Blood Updated: 07/03/22 1214     Glucose 231 mg/dL      Comment: Serial Number: [id number]:  [id number]       Lactate Dehydrogenase [[id number]]  (Abnormal) Collected: 07/03/22 0946    Specimen: Blood Updated: 07/03/22 1048     LDH 1,023 U/L     Comprehensive Metabolic Panel [[id number]]  (Abnormal) Collected: 07/03/22 0946    Specimen: Blood Updated: 07/03/22 1038     Glucose 341 mg/dL      BUN 28 mg/dL      Creatinine 0.56 mg/dL      Sodium 134 mmol/L      Potassium 4.8 mmol/L      Chloride 97 mmol/L      CO2 24.0 mmol/L      Calcium 8.9 mg/dL      Total Protein 7.3 g/dL      Albumin 3.50 g/dL      ALT (SGPT) 9 U/L      AST (SGOT) 14 U/L      Alkaline Phosphatase 791 U/L      Total Bilirubin 0.5 mg/dL      Globulin 3.8 gm/dL      A/G Ratio 0.9 g/dL      BUN/Creatinine Ratio 50.0     Anion Gap 13.0 mmol/L      eGFR 114.1 mL/min/1.73      Comment: National Kidney Foundation and American Society of Nephrology (ASN) Task Force recommended calculation based on the Chronic Kidney Disease Epidemiology Collaboration (CKD-EPI) equation refit without adjustment for race.       Narrative:      GFR Normal >60  Chronic Kidney Disease <60  Kidney Failure <15      Uric Acid [[id number]]  (Abnormal) Collected: 07/03/22 0946    Specimen: Blood Updated: 07/03/22 1037     Uric Acid 12.4 mg/dL           IMAGING REVIEWED:  XR Chest 1 View    Result Date: 7/3/2022  1.Hazy bilateral airspace  opacities, which could reflect pulmonary edema or pneumonia. 2.Cardiomegaly.  Electronically Signed By-Amador Larry MD On:7/3/2022 6:09 PM This report was finalized on [id number] by  Amador Larry MD.      Assessment & Plan   ASSESSMENT:  1. CML: On hydroxyurea 2 g per day. Continue same.  White count improved to 84.5, plan for bone marrow biopsy.  We will continue Hydrea for now.    2. Lung disease.Continue present treatment. Nilotinib should not be used any more.  Plan for different tyrosine kinase inhibitor.  3. Check for tumor lysis labs with uric acid, CMP.  Uric acid elevated 12.4, will start allopurinol check Phos levels.    PLAN:  1. As above.

## 2022-07-04 NOTE — CONSULTS
"Reason for Consultation: Heavy vaginal bleeding    Patient Care Team:  Houston Oro MD as PCP - General (Family Medicine)  Meghann Lerma MD as Consulting Physician (Hematology and Oncology)    Chief complaint heavy vaginal bleeding, passing clots    Subjective .     History of present illness: 46-year-old -1-0-8 who has been admitted for CML.  While here she started her period on .  She reports on Saturday her bleeding became heavy.  She began passing golf ball size clots and larger.  She denies having this before.      GYN history  She says her periods are regular usually lasting 3 to 4 days with a moderate flow and minimal cramping.  She had a tubal ligation with her last  12 years ago.  Last Pap was 3 years ago.  She denies abnormal Pap smears.  Her last 1 was done at the health clinic at .    OB history  Term spontaneous vaginal deliveries x4.    with twins, 21 years ago, \"3 months early \"  Term repeat C-sections x2, last  with sterilization    Objective     Vital Signs   Vitals:    22 1607 22 0104 22 0758 22 1600   BP: 101/59 98/56 113/66 101/67   BP Location:  Right arm Right arm Right arm   Patient Position:  Lying Sitting    Pulse: 106 91 100 98   Resp:  16 18 16   Temp: 98.4 °F (36.9 °C) 98 °F (36.7 °C) 97.7 °F (36.5 °C) 98.1 °F (36.7 °C)   TempSrc: Oral Oral Oral Oral   SpO2: 98% 100% 100% 100%   Weight:       Height:         Temp (24hrs), Av.9 °F (36.6 °C), Min:97.7 °F (36.5 °C), Max:98.1 °F (36.7 °C)      Physical Exam:       General Appearance:    Alert, cooperative, in no acute distress   Head:    Normocephalic, without obvious abnormality, atraumatic   Eyes:         PERRLA   Breast Exam:    Deferred   Abdomen:     soft non-tender, mildly distended, no guarding, no rebound tenderness   Genitalia:   Deferred   Extremities:   Moves all extremities well     Lab Results:  Lab Results (last 48 hours)     " Procedure Component Value Units Date/Time    POC Glucose Once [[id number]]  (Abnormal) Collected: 07/04/22 1634    Specimen: Blood Updated: 07/04/22 1635     Glucose 249 mg/dL      Comment: Serial Number: [id number]:  [id number]       Blood Culture - Blood, Arm, Left [[id number]]  (Normal) Collected: 06/29/22 1625    Specimen: Blood from Arm, Left Updated: 07/04/22 1633     Blood Culture No growth at 5 days    Blood Culture - Blood, Arm, Left [[id number]]  (Normal) Collected: 06/29/22 1533    Specimen: Blood from Arm, Left Updated: 07/04/22 1548     Blood Culture No growth at 5 days    POC Glucose Once [[id number]]  (Abnormal) Collected: 07/04/22 1134    Specimen: Blood Updated: 07/04/22 1135     Glucose 254 mg/dL      Comment: Serial Number: [id number]:  [id number]       Path Consult Reflex [[id number]] Collected: 07/04/22 0425    Specimen: Blood Updated: 07/04/22 0826     Pathology Review Yes    Manual Differential [[id number]]  (Abnormal) Collected: 07/04/22 0425    Specimen: Blood Updated: 07/04/22 0826     Neutrophil % 66.0 %      Lymphocyte % 1.0 %      Monocyte % 4.0 %      Eosinophil % 5.0 %      Basophil % 11.0 %      Promyelocyte % 3.0 %      Blasts % 10.0 %      Neutrophils Absolute 55.77 10*3/mm3      Lymphocytes Absolute 0.85 10*3/mm3      Monocytes Absolute 3.38 10*3/mm3      Eosinophils Absolute 4.23 10*3/mm3      Basophils Absolute 9.30 10*3/mm3      Anisocytosis Slight/1+     Basophilic Stippling Slight/1+     Polychromasia Slight/1+     WBC Morphology Normal     Platelet Estimate Adequate     Large Platelets Slight/1+    Narrative:      Basophils >4%    CBC & Differential [[id number]]  (Abnormal) Collected: 07/04/22 0425    Specimen: Blood Updated: 07/04/22 0826    Narrative:      The following orders were created for panel order CBC & Differential.  Procedure                               Abnormality         Status                     ---------                               -----------          ------                     CBC Auto Differential[[id number]]        Abnormal            Final result               Scan Slide[[id number]]                                       Final result                 Please view results for these tests on the individual orders.    Scan Slide [[id number]] Collected: 07/04/22 0425    Specimen: Blood Updated: 07/04/22 0826     Scan Slide --     Comment: See Manual Differential Results       CBC Auto Differential [[id number]]  (Abnormal) Collected: 07/04/22 0425    Specimen: Blood Updated: 07/04/22 0826     WBC 84.50 10*3/mm3      RBC 2.86 10*6/mm3      Hemoglobin 7.2 g/dL      Hematocrit 23.2 %      MCV 81.1 fL      MCH 25.1 pg      MCHC 30.9 g/dL      RDW 22.0 %      RDW-SD 61.3 fl      MPV 7.6 fL      Platelets 189 10*3/mm3     Narrative:      The previously reported component NRBC is no longer being reported. Previous result was 0.5 /100 WBC (Reference Range: 0.0-0.2 /100 WBC) on 7/4/2022 at [facility] EDT.    POC Glucose Once [[id number]]  (Abnormal) Collected: 07/04/22 0717    Specimen: Blood Updated: 07/04/22 0718     Glucose 223 mg/dL      Comment: Serial Number: [id number]:  [id number]       Hemoglobin & Hematocrit, Blood [[id number]]  (Abnormal) Collected: 07/03/22 1853    Specimen: Blood Updated: 07/03/22 2010     Hemoglobin 7.6 g/dL      Hematocrit 25.6 %     POC Glucose Once [[id number]]  (Abnormal) Collected: 07/03/22 1949    Specimen: Blood Updated: 07/03/22 1950     Glucose 236 mg/dL      Comment: Serial Number: [id number]:  [id number]       Blood Gas, Arterial - [[id number]]  (Abnormal) Collected: 07/03/22 1643    Specimen: Arterial Blood Updated: 07/03/22 1647     Site Right Brachial     Klever's Test Positive     pH, Arterial 7.465 pH units      pCO2, Arterial 34.3 mm Hg      pO2, Arterial 48.2 mm Hg      HCO3, Arterial 24.7 mmol/L      Base Excess, Arterial 1.0 mmol/L      Comment: Serial Number: [id number]:  [id number]        O2 Saturation, Arterial 86.3 %       CO2 Content 25.7 mmol/L      Barometric Pressure for Blood Gas --     Comment: N/A        Modality Cannula     FIO2 32 %      Hemodilution No    POC Glucose Once [[id number]]  (Abnormal) Collected: 07/03/22 1643    Specimen: Blood Updated: 07/03/22 1644     Glucose 333 mg/dL      Comment: Serial Number: [id number]:  [id number]       POC Glucose Once [[id number]]  (Abnormal) Collected: 07/03/22 1213    Specimen: Blood Updated: 07/03/22 1214     Glucose 231 mg/dL      Comment: Serial Number: [id number]:  [id number]       Lactate Dehydrogenase [[id number]]  (Abnormal) Collected: 07/03/22 0946    Specimen: Blood Updated: 07/03/22 1048     LDH 1,023 U/L     Comprehensive Metabolic Panel [[id number]]  (Abnormal) Collected: 07/03/22 0946    Specimen: Blood Updated: 07/03/22 1038     Glucose 341 mg/dL      BUN 28 mg/dL      Creatinine 0.56 mg/dL      Sodium 134 mmol/L      Potassium 4.8 mmol/L      Chloride 97 mmol/L      CO2 24.0 mmol/L      Calcium 8.9 mg/dL      Total Protein 7.3 g/dL      Albumin 3.50 g/dL      ALT (SGPT) 9 U/L      AST (SGOT) 14 U/L      Alkaline Phosphatase 791 U/L      Total Bilirubin 0.5 mg/dL      Globulin 3.8 gm/dL      A/G Ratio 0.9 g/dL      BUN/Creatinine Ratio 50.0     Anion Gap 13.0 mmol/L      eGFR 114.1 mL/min/1.73      Comment: National Kidney Foundation and American Society of Nephrology (ASN) Task Force recommended calculation based on the Chronic Kidney Disease Epidemiology Collaboration (CKD-EPI) equation refit without adjustment for race.       Narrative:      GFR Normal >60  Chronic Kidney Disease <60  Kidney Failure <15      Uric Acid [[id number]]  (Abnormal) Collected: 07/03/22 0946    Specimen: Blood Updated: 07/03/22 1037     Uric Acid 12.4 mg/dL     POC Glucose Once [[id number]]  (Abnormal) Collected: 07/03/22 0707    Specimen: Blood Updated: 07/03/22 0707     Glucose 247 mg/dL      Comment: Serial Number: [id number]:  [id number]       POC Glucose Once [[id number]]   (Abnormal) Collected: 07/02/22 1938    Specimen: Blood Updated: 07/02/22 1939     Glucose 304 mg/dL      Comment: Serial Number: [id number]:  [id number]             Radiology Results:  Imaging Results (Last 72 Hours)     Procedure Component Value Units Date/Time    US Pelvis Complete [[id number]] Collected: 07/04/22 1639     Updated: 07/04/22 1645    Narrative:      DATE OF EXAM:  7/4/2022 3:51 PM     PROCEDURE:  US NON-OB TRANSVAGINAL-, US PELVIS COMPLETE-     INDICATIONS:  menorrhagia; C92.10-Chronic myeloid leukemia, BCR/ABL-positive, not  having achieved remission; J18.9-Pneumonia, unspecified organism;  I50.9-Heart failure, unspecified     COMPARISON:  CT abdomen pelvis from 06/01/2022     TECHNIQUE:   Transvaginal pelvic ultrasound was performed.  Grayscale and color  Doppler imaging was utilized to evaluate the pelvic area with image  documentation per protocol.     FINDINGS:  The uterus measures 8.6 x 3.3 x 5.4 cm and contains a couple nonspecific  dystrophic calcifications, but is otherwise unremarkable. The  endometrial stripe thickness measures 7.6 mm. The right ovary measures  3.1 x 2.7 x 3.5 cm and contains a 2.3 cm cyst. The left ovary measures  2.3 x 2.6 x 2.0 cm and appears normal. Normal flow is seen within both  ovaries.       Impression:      1.Uterus and left adnexa are unremarkable.  2.2.3 cm right adnexal cyst, likely physiologic.     Electronically Signed By-Amador Larry MD On:7/4/2022 4:43 PM  This report was finalized on [id number] by  Amador Larry MD.    US Non-ob Transvaginal [[id number]] Collected: 07/04/22 1639     Updated: 07/04/22 1645    Narrative:      DATE OF EXAM:  7/4/2022 3:51 PM     PROCEDURE:  US NON-OB TRANSVAGINAL-, US PELVIS COMPLETE-     INDICATIONS:  menorrhagia; C92.10-Chronic myeloid leukemia, BCR/ABL-positive, not  having achieved remission; J18.9-Pneumonia, unspecified organism;  I50.9-Heart failure, unspecified     COMPARISON:  CT abdomen pelvis from  06/01/2022     TECHNIQUE:   Transvaginal pelvic ultrasound was performed.  Grayscale and color  Doppler imaging was utilized to evaluate the pelvic area with image  documentation per protocol.     FINDINGS:  The uterus measures 8.6 x 3.3 x 5.4 cm and contains a couple nonspecific  dystrophic calcifications, but is otherwise unremarkable. The  endometrial stripe thickness measures 7.6 mm. The right ovary measures  3.1 x 2.7 x 3.5 cm and contains a 2.3 cm cyst. The left ovary measures  2.3 x 2.6 x 2.0 cm and appears normal. Normal flow is seen within both  ovaries.       Impression:      1.Uterus and left adnexa are unremarkable.  2.2.3 cm right adnexal cyst, likely physiologic.     Electronically Signed By-Amador Larry MD On:7/4/2022 4:43 PM  This report was finalized on [id number] by  Amador Larry MD.    XR Chest 1 View [[id number]] Collected: 07/03/22 1808     Updated: 07/03/22 1811    Narrative:      XR CHEST 1 VW-     Date of Exam: 7/3/2022 4:45 PM     Indication: SOB; C92.10-Chronic myeloid leukemia, BCR/ABL-positive, not  having achieved remission; J18.9-Pneumonia, unspecified organism;  I50.9-Heart failure, unspecified.     Comparison Exams: 06/29/2022     Technique: Single AP chest radiograph     FINDINGS:  There are hazy bilateral airspace opacities. The heart is enlarged. The  osseous structures appear intact.       Impression:      1.Hazy bilateral airspace opacities, which could reflect pulmonary edema  or pneumonia.  2.Cardiomegaly.     Electronically Signed By-Amador Larry MD On:7/3/2022 6:09 PM  This report was finalized on [id number] by  Amador Larry MD.          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  Recommend progesterone for menorrhagia.  Patient may take Provera 10 mg daily to decrease bleeding.  She should follow-up as an outpatient for Pap smear and further evaluation including a pelvic exam.    I discussed the patients findings and my recommendations with patient    Cecilia Ernst MD  07/04/22  17:51 EDT

## 2022-07-04 NOTE — PROGRESS NOTES
[facility] Medicine Services Daily Progress Note    Patient Name: Nieves Mc  : 1975  MRN: [id number]  Primary Care Physician:  Houston Oro MD  Date of admission: 2022      Subjective      Chief Complaint: Shortness of breath.      Patient Reports:            2022.  Patient was seen and examined.  Patient complains of musculoskeletal pain.  Patient complained of cough and generalized weakness.        2022.  Patient was seen and examined.  Patient reported slight improvement in her symptoms.  No overnight events noted.          2022.  Patient was seen and examined.  Patient reported no overnight events.        7/3/2022  Patient was seen and examined.  Patient reported moderate improvement in pain control.  Patient complained of shortness of breath.           2022.  Patient was seen and examined.  Patient reported decreased pain control last night.  Patient said pain medications were reviewed.          Review of Systems   HENT: Negative.    Eyes: Negative.    Cardiovascular: Negative.    Respiratory: Negative for shortness of breath.    Endocrine: Negative.    Hematologic/Lymphatic: Negative.    Skin: Negative.    Musculoskeletal: Positive for joint pain. Negative for muscle cramps.   Gastrointestinal: Negative.    Genitourinary: Negative.    Neurological: Positive for weakness.   Psychiatric/Behavioral: Negative.    Allergic/Immunologic: Negative.             Objective      Vitals:   Temp:  [97.7 °F (36.5 °C)-98.1 °F (36.7 °C)] 98.1 °F (36.7 °C)  Heart Rate:  [] 98  Resp:  [16-18] 16  BP: ()/(56-67) 101/67  Flow (L/min):  [6] 6    Physical Exam  Vitals and nursing note reviewed.   Constitutional:       Appearance: She is ill-appearing.      Comments: Patient is sitting comfortably in bed and in no acute distress.  The nurse is at the bedside.   HENT:      Head: Normocephalic and atraumatic.      Nose: Nose normal. No congestion or  rhinorrhea.      Mouth/Throat:      Mouth: Mucous membranes are moist.      Pharynx: Oropharynx is clear. No oropharyngeal exudate or posterior oropharyngeal erythema.   Eyes:      Pupils: Pupils are equal, round, and reactive to light.   Cardiovascular:      Pulses: Normal pulses.      Heart sounds: Normal heart sounds. No murmur heard.    No friction rub. No gallop.      Comments: S1 and S2 present.  Positive tachycardia.  Pulmonary:      Effort: No respiratory distress.      Breath sounds: No wheezing or rhonchi.      Comments: Improved air entry bilaterally.  Chest:      Chest wall: No tenderness.   Abdominal:      General: Abdomen is flat. Bowel sounds are normal. There is no distension.      Palpations: Abdomen is soft.      Tenderness: There is no right CVA tenderness.   Musculoskeletal:         General: No swelling, tenderness, deformity or signs of injury.      Cervical back: No tenderness.      Right lower leg: No edema.      Left lower leg: No edema.   Skin:     Capillary Refill: Capillary refill takes less than 2 seconds.      Coloration: Skin is not jaundiced.      Findings: No bruising, lesion or rash.   Neurological:      Mental Status: She is alert.      Comments: No facial asymmetry noted.  Gait and station not tested.   Psychiatric:         Mood and Affect: Mood normal.         Behavior: Behavior normal.         Thought Content: Thought content normal.         Judgment: Judgment normal.               Result Review    Result Review:  I have personally reviewed the results from the time of this admission to 7/4/2022 16:53 EDT and agree with these findings:  [x]  Laboratory  [x]  Microbiology  [x]  Radiology  []  EKG/Telemetry   []  Cardiology/Vascular   []  Pathology  []  Old records  []  Other:  Most notable findings include:        Completed Collected: 07/04/22 1639    Updated: 07/04/22 1647   Narrative:     DATE OF EXAM:   7/4/2022 3:51 PM       PROCEDURE:   US NON-OB TRANSVAGINAL-, US PELVIS  COMPLETE-       INDICATIONS:   menorrhagia; C92.10-Chronic myeloid leukemia, BCR/ABL-positive, not   having achieved remission; J18.9-Pneumonia, unspecified organism;   I50.9-Heart failure, unspecified       COMPARISON:   CT abdomen pelvis from 06/01/2022       TECHNIQUE:   Transvaginal pelvic ultrasound was performed.  Grayscale and color   Doppler imaging was utilized to evaluate the pelvic area with image   documentation per protocol.       FINDINGS:   The uterus measures 8.6 x 3.3 x 5.4 cm and contains a couple nonspecific   dystrophic calcifications, but is otherwise unremarkable. The   endometrial stripe thickness measures 7.6 mm. The right ovary measures   3.1 x 2.7 x 3.5 cm and contains a 2.3 cm cyst. The left ovary measures   2.3 x 2.6 x 2.0 cm and appears normal. Normal flow is seen within both   ovaries.       Impression:     1.Uterus and left adnexa are unremarkable.   2.2.3 cm right adnexal cyst, likely physiologic.       Electronically Signed By-Amador Larry MD On:7/4/2022 4:43 PM   This report was finalized on [id number] by  Amador Larry MD.           Assessment & Plan    From previous notes and with minor updates.  Brief Patient Summary:    Patient is a 46 y.o. female with past medical history of chronic pain syndrome, depression with anxiety, history of pulmonary embolism, type 2 diabetes mellitus, CML, hypertension, chronic pain syndrome, COVID-19 virus infection who presented to [facility] on 6/29/2022 complaining of shortness of breath and chest pain.  Patient reports having worsening shortness of breath and cough, particularly over the last week to week and a half.  She mentioned associated pleuritic chest pain and persistent hot flashes.  She reports she has been trying to take her medications as prescribed. However, when she tries to eat or drink, it just comes right back up.  She states her symptoms are similar to when she was last in the hospital. Of note, patient  recently hospitalized from 6/1/2022-6/10/2022 for management of acute hypoxic respiratory failure in the setting of TRALI and CML.  She is on nilotinib (Tasigna) for her CML, but states it has not been working. She is scheduled to f/u with oncology in the clinic tomorrow for follow-up, but her symptoms were too bad so she came to the hospital for further assessment.  Patient was diagnosed with acute systolic heart failure and a blast crisis.  Cardiology consult completed.  Hematology/oncology consult was completed.         allopurinol, 300 mg, Oral, Daily  citalopram, 10 mg, Oral, Daily  furosemide, 20 mg, Intravenous, BID  gabapentin, 300 mg, Oral, TID  guaiFENesin, 600 mg, Oral, Q12H  hydroxyurea, 1,000 mg, Oral, BID  insulin glargine, 25 Units, Subcutaneous, Daily  insulin lispro, 0-24 Units, Subcutaneous, TID AC  losartan, 50 mg, Oral, Q24H  metoprolol succinate XL, 25 mg, Oral, Daily  rivaroxaban, 20 mg, Oral, Daily With Dinner  sodium chloride, 10 mL, Intravenous, Q12H  traZODone, 50 mg, Oral, Nightly             Active Hospital Problems:  Active Hospital Problems    Diagnosis    • Acute systolic CHF (congestive heart failure) (HCC)  Treat with Lasix.  Cardiology is following.        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      Possible menorrhagia.  Completed gynecology consult.      Shortness of breath.  -Improving.  Treat with oxygen therapy as needed.      Hypertension.  Treat with losartan.    Pulmonary embolism.  Treat with Xarelto.        • Moderate malnutrition (HCC)  Completes nutrition consult.    Chronic pain syndrome.  Treat with pain control, Roxicodone as needed.      • Type 2 diabetes mellitus with diabetic polyneuropathy, with long-term current use of insulin (HCC)  Treat with insulin therapy.      • Depression with anxiety  Treat with citalopram.      • History of pulmonary embolism  Continue anticoagulation.        • CML (chronic  myelocytic leukemia) (HCC)  Follow oncology recommendations.            Continue appropriate patient's home medications for other chronic medical conditions.  Continue the present level of care.  Patient and family agreed with the plan of care.      DVT prophylaxis:  Medical DVT prophylaxis orders are present.    CODE STATUS:    Code Status (Patient has no pulse and is not breathing): CPR (Attempt to Resuscitate)  Medical Interventions (Patient has pulse or is breathing): Full Support      Disposition: Pending patient's clinical improvement.    This patient has been examined wearing appropriate Personal Protective Equipment and discussed with hospital infection control department, [facility], infectious disease specialist and pulmonologist. 07/04/22      Electronically signed by Braden Huang MD, FACP, 07/04/22, 16:53 EDT.      [facility] Hospitalist Team

## 2022-07-04 NOTE — NURSING NOTE
Spoke with Dr Mukherjee to verify okay to treat patient with Provera and he verified it is okay.    Spoke with Dr Ernst to advise and she vu.  She inquired on US status- I called US and the on-call provider is on their way to do test.
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
## 2022-07-04 NOTE — CONSULTS
Inpatient Endocrine Consult  Consultation requested by hospitalist team for uncontrolled type 2 diabetes  Patient Care Team:  Houston Oro MD as PCP - General (Family Medicine)  Meghann Lerma MD as Consulting Physician (Hematology and Oncology)    Chief Complaint: Uncontrolled type 2 diabetes    HPI: This is a 46-year-old female with history of type 2 diabetes, CML, prior PE came in with chest pain and shortness of breath to the hospital.  Patient is diagnosed with a nonischemic cardiomyopathy most likely due to chemotherapy and is currently being followed by cardiology.  She is also being followed by oncology as well as pulmonary critical care.  Her blood sugars been running high and endocrine consultation is now requested for diabetes management.  Patient tells me at home she is on insulin but she does not remember the names.  She is eating well at this time.  I have reviewed her blood sugar records and they are usually about 200.      Past Medical History:   Diagnosis Date   • Bone pain    • Extremity pain     Carlin. legs pain   • Leg pain     left leg greater   • Migraine    • Pulmonary embolism (HCC)    • Vision loss     doing surgery       Social History     Socioeconomic History   • Marital status:    Tobacco Use   • Smoking status: Never Smoker   • Smokeless tobacco: Never Used   Vaping Use   • Vaping Use: Never used   Substance and Sexual Activity   • Alcohol use: No   • Drug use: No   • Sexual activity: Defer       Family History   Problem Relation Age of Onset   • Diabetes Mother    • Diabetes Maternal Grandmother    • Heart attack Maternal Grandmother    • Stroke Maternal Grandmother        Allergies   Allergen Reactions   • Hydromorphone GI Intolerance     dilaudid   • Morphine Nausea And Vomiting   • Propofol Hives     Previously tolerated being premedicated with diphenhydramine and famotadine       ROS:   Constitutional:  Denies fatigue, tiredness.    Eyes:  Denies  change in visual acuity   HENT:  Denies nasal congestion or sore throat   Respiratory: Denies cough, shortness of breath.   Cardiovascular:  Denies chest pain, edema   GI:  Denies abdominal pain, nausea, vomiting.   :  Denies polyuria and polydipsia  Musculoskeletal:  Denies back pain or joint pain   Integument:  Denies dry skin, rash   Neurologic:  Denies headache, focal weakness or sensory changes   Endocrine:  Denies polyuria or polydipsia   Psychiatric:  Denies depression or anxiety      Vitals:    07/04/22 1600   BP: 101/67   Pulse: 98   Resp: 16   Temp: 98.1 °F (36.7 °C)   SpO2: 100%      Body mass index is 21.32 kg/m².     Physical Exam:  GEN: NAD, conversant  EYES: EOMI, PERRL, no conjunctival erythema  NECK: no thyromegaly, full ROM   CV: RRR, no murmurs/rubs/gallops, no peripheral edema  LUNG: CTAB, no wheezes/rales/rhonchi  SKIN: no rashes, no acanthosis  MSK: no deformities, full ROM of all extremities  NEURO: no tremors, DTR normal  PSYCH: AOX3, appropriate mood, affect normal      Results Review:     I reviewed the patient's new clinical results.    Lab Results   Component Value Date    GLUCOSE 341 (H) 07/03/2022    BUN 28 (H) 07/03/2022    CREATININE 0.56 (L) 07/03/2022    EGFRIFNONA 133 02/02/2022    BCR 50.0 (H) 07/03/2022    K 4.8 07/03/2022    CO2 24.0 07/03/2022    CALCIUM 8.9 07/03/2022    ALBUMIN 3.50 07/03/2022    LABIL2 1.0 04/18/2019    AST 14 07/03/2022    ALT 9 07/03/2022       Lab Results   Component Value Date    HGBA1C 8.4 (H) 06/29/2022    HGBA1C 10.2 (H) 06/02/2022    HGBA1C 10.8 (H) 01/13/2022     Lab Results   Component Value Date    CREATININE 0.56 (L) 07/03/2022     Results from last 7 days   Lab Units 07/04/22  1634 07/04/22  1134 07/04/22  0717 07/03/22  1949 07/03/22  1643 07/03/22  1213   GLUCOSE mg/dL 249* 254* 223* 236* 333* 231*       Medication Review: Reviewed.       Current Facility-Administered Medications:   •  acetaminophen (TYLENOL) tablet 650 mg, 650 mg, Oral, Q4H  PRN, 650 mg at 06/30/22 0849 **OR** acetaminophen (TYLENOL) 160 MG/5ML solution 650 mg, 650 mg, Oral, Q4H PRN **OR** acetaminophen (TYLENOL) suppository 650 mg, 650 mg, Rectal, Q4H PRN, Mónica Tillman MD  •  allopurinol (ZYLOPRIM) tablet 300 mg, 300 mg, Oral, Daily, Dolores Mukherjee MD, 300 mg at 07/04/22 1203  •  ALPRAZolam (XANAX) tablet 0.25 mg, 0.25 mg, Oral, Nightly PRN, Mónica Tillman MD, 0.25 mg at 07/03/22 2044  •  aluminum-magnesium hydroxide-simethicone (MAALOX MAX) 400-400-40 MG/5ML suspension 15 mL, 15 mL, Oral, Q6H PRN, Mónica Tillman MD  •  citalopram (CeleXA) tablet 10 mg, 10 mg, Oral, Daily, Mónica Tillman MD, 10 mg at 07/04/22 0950  •  dextrose (D50W) (25 g/50 mL) IV injection 25 g, 25 g, Intravenous, Q15 Min PRN, Braden Huang MD  •  dextrose (GLUTOSE) oral gel 15 g, 15 g, Oral, Q15 Min PRN, Braden Huang MD  •  furosemide (LASIX) injection 20 mg, 20 mg, Intravenous, BID, Hamida Feldman MD, 20 mg at 07/04/22 0950  •  gabapentin (NEURONTIN) capsule 300 mg, 300 mg, Oral, TID, Mónica Tillman MD, 300 mg at 07/04/22 1627  •  glucagon (human recombinant) (GLUCAGEN DIAGNOSTIC) 1 mg in sterile water (preservative free) 1 mL injection, 1 mg, Subcutaneous, PRN, Mónica Tillman MD  •  glucagon (human recombinant) (GLUCAGEN DIAGNOSTIC) 1 mg in sterile water (preservative free) 1 mL injection, 1 mg, Intramuscular, Q15 Min PRN, Braden Huang MD  •  guaiFENesin (MUCINEX) 12 hr tablet 600 mg, 600 mg, Oral, Q12H, Braden Huang MD, 600 mg at 07/04/22 0950  •  guaiFENesin-codeine (GUAIFENESIN AC) 100-10 MG/5ML liquid 5 mL, 5 mL, Oral, Q4H PRN, Braden Huang MD  •  hydroxyurea (HYDREA) capsule 1,000 mg, 1,000 mg, Oral, BID, Mónica Tillman MD, 1,000 mg at 07/04/22 0950  •  insulin glargine (LANTUS, SEMGLEE) injection 25 Units, 25 Units, Subcutaneous, Daily, Mónica Tillman MD, 25 Units at 07/04/22 0950  •  insulin lispro (ADMELOG) injection 0-24 Units, 0-24 Units, Subcutaneous, TID  AC, 12 Units at 07/04/22 1203 **AND** insulin lispro (ADMELOG) injection 0-24 Units, 0-24 Units, Subcutaneous, PRN, Braden Huang MD  •  losartan (COZAAR) tablet 50 mg, 50 mg, Oral, Q24H, Hamida Feldman MD, 50 mg at 07/04/22 0950  •  melatonin tablet 5 mg, 5 mg, Oral, Nightly PRN, Mónica Tillman MD  •  metoprolol succinate XL (TOPROL-XL) 24 hr tablet 25 mg, 25 mg, Oral, Daily, Mónica Tillman MD, 25 mg at 07/04/22 0950  •  ondansetron (ZOFRAN) tablet 4 mg, 4 mg, Oral, Q6H PRN, 4 mg at 07/03/22 1238 **OR** ondansetron (ZOFRAN) injection 4 mg, 4 mg, Intravenous, Q6H PRN, Mónica Tillman MD  •  oxyCODONE (ROXICODONE) immediate release tablet 10 mg, 10 mg, Oral, Q4H PRN, Braden Huang MD, 10 mg at 07/04/22 1627  •  rivaroxaban (XARELTO) tablet 20 mg, 20 mg, Oral, Daily With Dinner, Mónica Tillman MD, 20 mg at 07/03/22 1701  •  sodium chloride 0.9 % flush 10 mL, 10 mL, Intravenous, PRN, Randal Powers DO  •  [COMPLETED] Insert peripheral IV, , , Once **AND** sodium chloride 0.9 % flush 10 mL, 10 mL, Intravenous, PRN, Randal Powers DO  •  sodium chloride 0.9 % flush 10 mL, 10 mL, Intravenous, Q12H, Mónica Tillman MD, 10 mL at 07/04/22 0950  •  sodium chloride 0.9 % flush 10 mL, 10 mL, Intravenous, PRN, Mónica Tillman MD  •  traZODone (DESYREL) tablet 50 mg, 50 mg, Oral, Nightly, Braden Huang MD, 50 mg at 07/03/22 2044          Assessment and plan:  Diabetes mellitus type 2: Uncontrolled with significant hyperglycemia, at this time I will change Lantus to 18 units subcu daily in the morning and add Humalog 6 units with each meal along with Humalog sliding scale and follow blood sugars and make further adjustments as needed.    Cardiomyopathy: Followed by cardiology    Chronic myelogenous leukemia: Followed by oncology    PE: Currently on Xarelto and followed by primary team as well as pulmonary and critical care medicine.    Thank you very much for the consultation.      Dalton  MD Radha FACE.

## 2022-07-04 NOTE — PLAN OF CARE
Goal Outcome Evaluation:  Plan of Care Reviewed With: patient        Progress: no change  Outcome Evaluation: Patient continues to pass large blood clots from her vagina. Patient seen by OBGYN today and had vaginal and pelvic US. PAtient in bed with call light in reach, able to make needs known.

## 2022-07-04 NOTE — PROGRESS NOTES
Cardiology Progress Note      Admiting Physician:  Braden Huang MD   LOS: 5 days       Reason For Followup:  Nonischemic cardiomyopathy      Subjective:    Interval History:  Seen and examined.  Chart and labs reviewed.  Feels better today.  Nausea has improved.  Still short of breath.    Review of Systems:  A complete review of systems was undertaken with the pertinent cardiovascular findings listed in history of present illness and all other systems being negative.     Assessment & Plan    Impressions:  Nonischemic cardiomyopathy likely secondary to chemotherapy     EF 40 to 45%  Chronic myelogenous leukemia with blast crisis  Nausea    Recommendations:  Continue diuresis as renal function allows  Continue with guideline directed medical therapy for cardiomyopathy  Monitor rate and rhythm closely.  Monitor renal function and electrolytes.  Continue fluid restriction      Objective:    Medication Review:   Scheduled Meds:allopurinol, 300 mg, Oral, Daily  citalopram, 10 mg, Oral, Daily  furosemide, 20 mg, Intravenous, BID  gabapentin, 300 mg, Oral, TID  guaiFENesin, 600 mg, Oral, Q12H  hydroxyurea, 1,000 mg, Oral, BID  insulin glargine, 25 Units, Subcutaneous, Daily  insulin lispro, 0-24 Units, Subcutaneous, TID AC  losartan, 50 mg, Oral, Q24H  metoprolol succinate XL, 25 mg, Oral, Daily  rivaroxaban, 20 mg, Oral, Daily With Dinner  sodium chloride, 10 mL, Intravenous, Q12H  traZODone, 50 mg, Oral, Nightly      Continuous Infusions:   PRN Meds:.•  acetaminophen **OR** acetaminophen **OR** acetaminophen  •  ALPRAZolam  •  aluminum-magnesium hydroxide-simethicone  •  dextrose  •  dextrose  •  glucagon (human recombinant)  •  glucagon (human recombinant)  •  guaiFENesin-codeine  •  insulin lispro **AND** insulin lispro  •  melatonin  •  ondansetron **OR** ondansetron  •  oxyCODONE  •  sodium chloride  •  [COMPLETED] Insert peripheral IV **AND** sodium chloride  •  sodium chloride    Patient Active Problem List    Diagnosis   • Leukemia not having achieved remission ([facility])   • CML (chronic myelocytic leukemia) ([facility])   • Depression with anxiety   • Right knee pain   • Left leg pain   • Severe anemia   • History of pulmonary embolism   • Medically noncompliant   • Visual changes   • Type 2 diabetes mellitus with diabetic polyneuropathy, with long-term current use of insulin ([facility])   • Vitamin D deficiency   • Shortness of breath   • COVID-19 virus infection   • Dyspnea   • Tachycardia   • CML (chronic myeloid leukemia) with failed remission ([facility])   • Moderate malnutrition ([facility])   • Atypical pneumonia   • 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 ([facility])   • Acute systolic CHF (congestive heart failure) ([facility])         Physical Exam:    General: Alert, cooperative, no distress, appears stated age  Head:  Normocephalic, atraumatic, mucous membranes moist  Eyes:  Conjunctivae/corneas clear, EOM's intact     Neck:  Supple,  no bruit  Lungs:  Clear to auscultation bilaterally, no wheezes rhonchi rales are noted  Chest wall: No tenderness  Heart::  Regular rate and rhythm, S1 and S2 normal, 1/6 holosystolic murmur.  No rub or gallop  Abdomen: Soft, non-tender, nondistended bowel sounds active  Extremities: No cyanosis, clubbing, or edema  Pulses: 2+ and symmetric all extremities  Skin:  No rashes or lesions  Neuro/psych: A&O x3. CN II through XII are grossly intact with appropriate affect    Vital Signs:  Vitals:    07/03/22 0708 07/03/22 1607 07/04/22 0104 07/04/22 0758   BP: 125/71 101/59 98/56 113/66   BP Location: Right arm  Right arm Right arm   Patient Position: Lying  Lying Sitting   Pulse: 102 106 91 100   Resp: 16  16 18   Temp: 98.2 °F (36.8 °C) 98.4 °F (36.9 °C) 98 °F (36.7 °C) 97.7 °F (36.5 °C)   TempSrc: Oral Oral Oral Oral   SpO2: 100% 98% 100% 100%   Weight:       Height:         Wt Readings from Last 1 Encounters:   07/02/22 56.3 kg (124 lb 3.2 oz)       Intake/Output Summary (Last 24 hours) at 7/4/2022 1046  Last  data filed at 7/4/2022 0910  Gross per 24 hour   Intake 540 ml   Output --   Net 540 ml         Results Review:     CBC    Results from last 7 days   Lab Units 07/04/22  0425 07/03/22  1853 07/02/22  0822 06/29/22  2343 06/29/22  1533   WBC 10*3/mm3 84.50*  --  101.60* 205.90* 201.00*   HEMOGLOBIN g/dL 7.2* 7.6* 7.6* 8.0* 8.5*   PLATELETS 10*3/mm3 189  --  222 291 293     Cr Clearance Estimated Creatinine Clearance: 111.6 mL/min (A) (by C-G formula based on SCr of 0.56 mg/dL (L)).  Coag   Results from last 7 days   Lab Units 06/29/22  1533   INR  1.22*   APTT seconds 35.7*     HbA1C   Lab Results   Component Value Date    HGBA1C 8.4 (H) 06/29/2022    HGBA1C 10.2 (H) 06/02/2022    HGBA1C 10.8 (H) 01/13/2022     Blood Glucose   Glucose   Date/Time Value Ref Range Status   07/04/2022 0717 223 (H) 70 - 105 mg/dL Final     Comment:     Serial Number: [id number]:  [id number]   07/03/2022 1949 236 (H) 70 - 105 mg/dL Final     Comment:     Serial Number: [id number]:  [id number]   07/03/2022 1643 333 (H) 70 - 105 mg/dL Final     Comment:     Serial Number: [id number]:  [id number]   07/03/2022 1213 231 (H) 70 - 105 mg/dL Final     Comment:     Serial Number: [id number]:  [id number]   07/03/2022 0707 247 (H) 70 - 105 mg/dL Final     Comment:     Serial Number: [id number]:  [id number]   07/02/2022 1938 304 (H) 70 - 105 mg/dL Final     Comment:     Serial Number: [id number]:  [id number]   07/02/2022 1610 373 (H) 70 - 105 mg/dL Final     Comment:     Serial Number: [id number]:  [id number]   07/02/2022 1137 273 (H) 70 - 105 mg/dL Final     Comment:     Serial Number: [id number]:  [id number]     Infection   Results from last 7 days   Lab Units 06/29/22  1625 06/29/22  1533   BLOODCX  No growth at 4 days No growth at 4 days     CMP   Results from last 7 days   Lab Units 07/03/22  0946 06/29/22  2343 06/29/22  1533   SODIUM mmol/L 134* 133* 134*   POTASSIUM mmol/L 4.8 3.4* 3.9    CHLORIDE mmol/L 97* 94* 97*   CO2 mmol/L 24.0 24.0 23.0   BUN mg/dL 28* 10 8   CREATININE mg/dL 0.56* 0.65 0.60   GLUCOSE mg/dL 341* 325* 275*   ALBUMIN g/dL 3.50  --  3.50   BILIRUBIN mg/dL 0.5  --  1.4*   ALK PHOS U/L 791*  --  985*   AST (SGOT) U/L 14  --  22   ALT (SGPT) U/L 9  --  14   LIPASE U/L  --   --  10*     ABG    Results from last 7 days   Lab Units 07/03/22  1643 06/29/22  1542   PH, ARTERIAL pH units 7.465* 7.503*   PCO2, ARTERIAL mm Hg 34.3* 29.1*   PO2 ART mm Hg 48.2* 57.1*   O2 SATURATION ART % 86.3* 92.2*   BASE EXCESS ART mmol/L 1.0 0.4     UA    Results from last 7 days   Lab Units 06/29/22  1750   NITRITE UA  Negative   WBC UA /HPF 3-5*   BACTERIA UA /HPF None Seen   SQUAM EPITHEL UA /HPF 3-6*     ELEANOR  No results found for: POCMETH, POCAMPHET, POCBARBITUR, POCBENZO, POCCOCAINE, POCOPIATES, POCOXYCODO, POCPHENCYC, POCPROPOXY, POCTHC, POCTRICYC  Lysis Labs   Results from last 7 days   Lab Units 07/04/22  0425 07/03/22  1853 07/03/22  0946 07/02/22  0822 06/29/22  2343 06/29/22  1533   INR   --   --   --   --   --  1.22*   APTT seconds  --   --   --   --   --  35.7*   HEMOGLOBIN g/dL 7.2* 7.6*  --  7.6* 8.0* 8.5*   PLATELETS 10*3/mm3 189  --   --  222 291 293   CREATININE mg/dL  --   --  0.56*  --  0.65 0.60     Radiology(recent) XR Chest 1 View    Result Date: 7/3/2022  1.Hazy bilateral airspace opacities, which could reflect pulmonary edema or pneumonia. 2.Cardiomegaly.  Electronically Signed By-Amador Larry MD On:7/3/2022 6:09 PM This report was finalized on [id number] by  Amador Larry MD.        Results from last 7 days   Lab Units 06/29/22  2343 06/29/22  1533   CK TOTAL U/L 18*  --    TROPONIN T ng/mL  --  <0.010       Imaging Results (Last 24 Hours)     Procedure Component Value Units Date/Time    XR Chest 1 View [[id number]] Collected: 07/03/22 1808     Updated: 07/03/22 1811    Narrative:      XR CHEST 1 VW-     Date of Exam: 7/3/2022 4:45 PM     Indication: SOB;  C92.10-Chronic myeloid leukemia, BCR/ABL-positive, not  having achieved remission; J18.9-Pneumonia, unspecified organism;  I50.9-Heart failure, unspecified.     Comparison Exams: 06/29/2022     Technique: Single AP chest radiograph     FINDINGS:  There are hazy bilateral airspace opacities. The heart is enlarged. The  osseous structures appear intact.       Impression:      1.Hazy bilateral airspace opacities, which could reflect pulmonary edema  or pneumonia.  2.Cardiomegaly.     Electronically Signed By-Amador Larry MD On:7/3/2022 6:09 PM  This report was finalized on [id number] by  Amador Larry MD.          Cardiac Studies:  Echo- Results for orders placed during the hospital encounter of 06/29/22    Adult Transthoracic Echo Complete W/ Cont if Necessary Per Protocol    Interpretation Summary  · The left ventricular cavity is mildly dilated.  · Left ventricular wall thickness is consistent with mild concentric hypertrophy.  · Left ventricular ejection fraction appears to be 41 - 45%.  · Left ventricular diastolic function is consistent with (grade Ia w/high LAP) impaired relaxation.    Stress Myoview-  Cath-        Trenton Ruiz DO  07/04/22  10:46 EDT

## 2022-07-04 NOTE — PLAN OF CARE
Goal Outcome Evaluation:     Patient continues to have pain that is managed when receiving every four hours.  She is continuing to pass clots vaginally which Gyn has been consulted for.  I will continue to monitor patient.

## 2022-07-04 NOTE — PLAN OF CARE
Problem: Adult Inpatient Plan of Care  Goal: Plan of Care Review  Outcome: Ongoing, Progressing  Flowsheets (Taken 7/4/2022 0200)  Progress: improving  Plan of Care Reviewed With: patient  Outcome Evaluation: Patient rested throughout the night, no complaints at this time.  Goal: Patient-Specific Goal (Individualized)  Outcome: Ongoing, Progressing  Goal: Absence of Hospital-Acquired Illness or Injury  Outcome: Ongoing, Progressing  Intervention: Identify and Manage Fall Risk  Recent Flowsheet Documentation  Taken 7/4/2022 0026 by Alan Perry RN  Safety Promotion/Fall Prevention:   safety round/check completed   room organization consistent   nonskid shoes/slippers when out of bed   clutter free environment maintained   assistive device/personal items within reach  Taken 7/3/2022 2235 by Alan Perry RN  Safety Promotion/Fall Prevention: safety round/check completed  Taken 7/3/2022 2044 by Alan Perry RN  Safety Promotion/Fall Prevention:   safety round/check completed   room organization consistent   nonskid shoes/slippers when out of bed   clutter free environment maintained   assistive device/personal items within reach  Intervention: Prevent Skin Injury  Recent Flowsheet Documentation  Taken 7/3/2022 2044 by Alan Perry RN  Skin Protection:   adhesive use limited   protective footwear used   transparent dressing maintained  Intervention: Prevent and Manage VTE (Venous Thromboembolism) Risk  Recent Flowsheet Documentation  Taken 7/3/2022 2044 by Alan Perry RN  VTE Prevention/Management:   bilateral   sequential compression devices off  Intervention: Prevent Infection  Recent Flowsheet Documentation  Taken 7/4/2022 0026 by Alan Perry RN  Infection Prevention:   single patient room provided   personal protective equipment utilized   rest/sleep promoted   hand hygiene promoted  Taken 7/3/2022 2044 by Alan Perry RN  Infection Prevention:   single patient room provided    rest/sleep promoted   personal protective equipment utilized   hand hygiene promoted  Goal: Optimal Comfort and Wellbeing  Outcome: Ongoing, Progressing  Intervention: Monitor Pain and Promote Comfort  Recent Flowsheet Documentation  Taken 7/3/2022 2044 by Alan Perry RN  Pain Management Interventions: no interventions per patient request  Intervention: Provide Person-Centered Care  Recent Flowsheet Documentation  Taken 7/3/2022 2044 by Alan Perry RN  Trust Relationship/Rapport:   choices provided   care explained   emotional support provided   questions answered   empathic listening provided   reassurance provided   questions encouraged   thoughts/feelings acknowledged  Goal: Readiness for Transition of Care  Outcome: Ongoing, Progressing     Problem: Skin Injury Risk Increased  Goal: Skin Health and Integrity  Outcome: Ongoing, Progressing  Intervention: Optimize Skin Protection  Recent Flowsheet Documentation  Taken 7/3/2022 2044 by Alan Perry RN  Pressure Reduction Techniques: frequent weight shift encouraged  Pressure Reduction Devices: pressure-redistributing mattress utilized  Skin Protection:   adhesive use limited   protective footwear used   transparent dressing maintained   Goal Outcome Evaluation:  Plan of Care Reviewed With: patient        Progress: improving  Outcome Evaluation: Patient rested throughout the night, no complaints at this time.

## 2022-07-05 LAB
ABO GROUP BLD: NORMAL
ALBUMIN SERPL-MCNC: 3.6 G/DL (ref 3.5–5.2)
ALBUMIN SERPL-MCNC: 3.9 G/DL (ref 3.5–5.2)
ALBUMIN/GLOB SERPL: 1 G/DL
ALBUMIN/GLOB SERPL: 1 G/DL
ALP SERPL-CCNC: 672 U/L (ref 39–117)
ALP SERPL-CCNC: 720 U/L (ref 39–117)
ALT SERPL W P-5'-P-CCNC: 9 U/L (ref 1–33)
ALT SERPL W P-5'-P-CCNC: 9 U/L (ref 1–33)
ANION GAP SERPL CALCULATED.3IONS-SCNC: 10 MMOL/L (ref 5–15)
ANION GAP SERPL CALCULATED.3IONS-SCNC: 12 MMOL/L (ref 5–15)
ANION GAP SERPL CALCULATED.3IONS-SCNC: 16 MMOL/L (ref 5–15)
ANISOCYTOSIS BLD QL: ABNORMAL
ANTI-E: NORMAL
ANTI-S: NORMAL
AST SERPL-CCNC: 11 U/L (ref 1–32)
AST SERPL-CCNC: 14 U/L (ref 1–32)
BACTERIA UR QL AUTO: ABNORMAL /HPF
BASOPHILS # BLD MANUAL: 11.34 10*3/MM3 (ref 0–0.2)
BASOPHILS NFR BLD MANUAL: 16 % (ref 0–1.5)
BILIRUB SERPL-MCNC: 0.3 MG/DL (ref 0–1.2)
BILIRUB SERPL-MCNC: 0.4 MG/DL (ref 0–1.2)
BILIRUB UR QL STRIP: NEGATIVE
BLASTS NFR BLD MANUAL: 6 % (ref 0–0)
BLD GP AB SCN SERPL QL: POSITIVE
BUN SERPL-MCNC: 59 MG/DL (ref 6–20)
BUN SERPL-MCNC: 60 MG/DL (ref 6–20)
BUN SERPL-MCNC: 61 MG/DL (ref 6–20)
BUN/CREAT SERPL: 59.6 (ref 7–25)
BUN/CREAT SERPL: 61.2 (ref 7–25)
BUN/CREAT SERPL: 64.9 (ref 7–25)
CALCIUM SPEC-SCNC: 8.6 MG/DL (ref 8.6–10.5)
CALCIUM SPEC-SCNC: 8.9 MG/DL (ref 8.6–10.5)
CALCIUM SPEC-SCNC: 8.9 MG/DL (ref 8.6–10.5)
CHLORIDE SERPL-SCNC: 95 MMOL/L (ref 98–107)
CHLORIDE SERPL-SCNC: 96 MMOL/L (ref 98–107)
CHLORIDE SERPL-SCNC: 96 MMOL/L (ref 98–107)
CLARITY UR: CLEAR
CO2 SERPL-SCNC: 20 MMOL/L (ref 22–29)
CO2 SERPL-SCNC: 23 MMOL/L (ref 22–29)
CO2 SERPL-SCNC: 25 MMOL/L (ref 22–29)
COLOR UR: YELLOW
CREAT SERPL-MCNC: 0.94 MG/DL (ref 0.57–1)
CREAT SERPL-MCNC: 0.98 MG/DL (ref 0.57–1)
CREAT SERPL-MCNC: 0.99 MG/DL (ref 0.57–1)
DEPRECATED RDW RBC AUTO: 62.1 FL (ref 37–54)
EGFRCR SERPLBLD CKD-EPI 2021: 71.4 ML/MIN/1.73
EGFRCR SERPLBLD CKD-EPI 2021: 72.2 ML/MIN/1.73
EGFRCR SERPLBLD CKD-EPI 2021: 75.9 ML/MIN/1.73
EOSINOPHIL # BLD MANUAL: 2.84 10*3/MM3 (ref 0–0.4)
EOSINOPHIL NFR BLD MANUAL: 4 % (ref 0.3–6.2)
ERYTHROCYTE [DISTWIDTH] IN BLOOD BY AUTOMATED COUNT: 22.2 % (ref 12.3–15.4)
GLOBULIN UR ELPH-MCNC: 3.5 GM/DL
GLOBULIN UR ELPH-MCNC: 4 GM/DL
GLUCOSE BLDC GLUCOMTR-MCNC: 181 MG/DL (ref 70–105)
GLUCOSE BLDC GLUCOMTR-MCNC: 186 MG/DL (ref 70–105)
GLUCOSE BLDC GLUCOMTR-MCNC: 188 MG/DL (ref 70–105)
GLUCOSE BLDC GLUCOMTR-MCNC: 211 MG/DL (ref 70–105)
GLUCOSE SERPL-MCNC: 184 MG/DL (ref 65–99)
GLUCOSE SERPL-MCNC: 193 MG/DL (ref 65–99)
GLUCOSE SERPL-MCNC: 205 MG/DL (ref 65–99)
GLUCOSE UR STRIP-MCNC: NEGATIVE MG/DL
HCT VFR BLD AUTO: 22.3 % (ref 34–46.6)
HGB BLD-MCNC: 6.7 G/DL (ref 12–15.9)
HGB UR QL STRIP.AUTO: ABNORMAL
HYALINE CASTS UR QL AUTO: ABNORMAL /LPF
KETONES UR QL STRIP: NEGATIVE
LAB AP CASE REPORT: NORMAL
LEUKOCYTE ESTERASE UR QL STRIP.AUTO: ABNORMAL
LYMPHOCYTES # BLD MANUAL: 6.38 10*3/MM3 (ref 0.7–3.1)
LYMPHOCYTES NFR BLD MANUAL: 3 % (ref 5–12)
MCH RBC QN AUTO: 24.8 PG (ref 26.6–33)
MCHC RBC AUTO-ENTMCNC: 30.3 G/DL (ref 31.5–35.7)
MCV RBC AUTO: 81.8 FL (ref 79–97)
METAMYELOCYTES NFR BLD MANUAL: 2 % (ref 0–0)
MONOCYTES # BLD: 2.13 10*3/MM3 (ref 0.1–0.9)
MYELOCYTES NFR BLD MANUAL: 1 % (ref 0–0)
NEUTROPHILS # BLD AUTO: 39.7 10*3/MM3 (ref 1.7–7)
NEUTROPHILS NFR BLD MANUAL: 50 % (ref 42.7–76)
NEUTS BAND NFR BLD MANUAL: 6 % (ref 0–5)
NITRITE UR QL STRIP: NEGATIVE
NT-PROBNP SERPL-MCNC: 858.8 PG/ML (ref 0–450)
PATH REPORT.FINAL DX SPEC: NORMAL
PATHOLOGY REVIEW: YES
PH UR STRIP.AUTO: <=5 [PH] (ref 5–8)
PHOSPHATE SERPL-MCNC: 7.1 MG/DL (ref 2.5–4.5)
PLAT MORPH BLD: NORMAL
PLATELET # BLD AUTO: 169 10*3/MM3 (ref 140–450)
PMV BLD AUTO: 7.7 FL (ref 6–12)
POTASSIUM SERPL-SCNC: 5.3 MMOL/L (ref 3.5–5.2)
POTASSIUM SERPL-SCNC: 6.4 MMOL/L (ref 3.5–5.2)
POTASSIUM SERPL-SCNC: 7.1 MMOL/L (ref 3.5–5.2)
PROMYELOCYTES NFR BLD MANUAL: 3 % (ref 0–0)
PROT SERPL-MCNC: 7.1 G/DL (ref 6–8.5)
PROT SERPL-MCNC: 7.9 G/DL (ref 6–8.5)
PROT UR QL STRIP: NEGATIVE
RBC # BLD AUTO: 2.72 10*6/MM3 (ref 3.77–5.28)
RBC # UR STRIP: ABNORMAL /HPF
REF LAB TEST METHOD: ABNORMAL
RH BLD: POSITIVE
SCAN SLIDE: NORMAL
SODIUM SERPL-SCNC: 130 MMOL/L (ref 136–145)
SODIUM SERPL-SCNC: 131 MMOL/L (ref 136–145)
SODIUM SERPL-SCNC: 132 MMOL/L (ref 136–145)
SODIUM UR-SCNC: 86 MMOL/L
SP GR UR STRIP: 1.01 (ref 1–1.03)
SQUAMOUS #/AREA URNS HPF: ABNORMAL /HPF
T&S EXPIRATION DATE: NORMAL
URATE SERPL-MCNC: 13 MG/DL (ref 2.4–5.7)
UROBILINOGEN UR QL STRIP: ABNORMAL
VARIANT LYMPHS NFR BLD MANUAL: 9 % (ref 19.6–45.3)
WBC # UR STRIP: ABNORMAL /HPF
WBC MORPH BLD: NORMAL
WBC NRBC COR # BLD: 70.9 10*3/MM3 (ref 3.4–10.8)

## 2022-07-05 PROCEDURE — 25010000002 FUROSEMIDE PER 20 MG: Performed by: INTERNAL MEDICINE

## 2022-07-05 PROCEDURE — 93010 ELECTROCARDIOGRAM REPORT: CPT | Performed by: INTERNAL MEDICINE

## 2022-07-05 PROCEDURE — 05HB33Z INSERTION OF INFUSION DEVICE INTO RIGHT BASILIC VEIN, PERCUTANEOUS APPROACH: ICD-10-PCS | Performed by: INTERNAL MEDICINE

## 2022-07-05 PROCEDURE — 25010000002 DIPHENHYDRAMINE PER 50 MG: Performed by: INTERNAL MEDICINE

## 2022-07-05 PROCEDURE — 86922 COMPATIBILITY TEST ANTIGLOB: CPT

## 2022-07-05 PROCEDURE — 94799 UNLISTED PULMONARY SVC/PX: CPT

## 2022-07-05 PROCEDURE — 80053 COMPREHEN METABOLIC PANEL: CPT | Performed by: INTERNAL MEDICINE

## 2022-07-05 PROCEDURE — 93005 ELECTROCARDIOGRAM TRACING: CPT | Performed by: INTERNAL MEDICINE

## 2022-07-05 PROCEDURE — 86870 RBC ANTIBODY IDENTIFICATION: CPT | Performed by: INTERNAL MEDICINE

## 2022-07-05 PROCEDURE — 86900 BLOOD TYPING SEROLOGIC ABO: CPT

## 2022-07-05 PROCEDURE — 86902 BLOOD TYPE ANTIGEN DONOR EA: CPT

## 2022-07-05 PROCEDURE — P9016 RBC LEUKOCYTES REDUCED: HCPCS

## 2022-07-05 PROCEDURE — 99232 SBSQ HOSP IP/OBS MODERATE 35: CPT | Performed by: NURSE PRACTITIONER

## 2022-07-05 PROCEDURE — 84300 ASSAY OF URINE SODIUM: CPT | Performed by: INTERNAL MEDICINE

## 2022-07-05 PROCEDURE — 25010000002 CALCIUM GLUCONATE-NACL 1-0.675 GM/50ML-% SOLUTION: Performed by: INTERNAL MEDICINE

## 2022-07-05 PROCEDURE — 63710000001 INSULIN REGULAR HUMAN PER 5 UNITS: Performed by: INTERNAL MEDICINE

## 2022-07-05 PROCEDURE — 84550 ASSAY OF BLOOD/URIC ACID: CPT | Performed by: INTERNAL MEDICINE

## 2022-07-05 PROCEDURE — 36410 VNPNXR 3YR/> PHY/QHP DX/THER: CPT

## 2022-07-05 PROCEDURE — 36430 TRANSFUSION BLD/BLD COMPNT: CPT

## 2022-07-05 PROCEDURE — C1751 CATH, INF, PER/CENT/MIDLINE: HCPCS

## 2022-07-05 PROCEDURE — 86901 BLOOD TYPING SEROLOGIC RH(D): CPT | Performed by: INTERNAL MEDICINE

## 2022-07-05 PROCEDURE — 94640 AIRWAY INHALATION TREATMENT: CPT

## 2022-07-05 PROCEDURE — 86900 BLOOD TYPING SEROLOGIC ABO: CPT | Performed by: INTERNAL MEDICINE

## 2022-07-05 PROCEDURE — 81001 URINALYSIS AUTO W/SCOPE: CPT | Performed by: INTERNAL MEDICINE

## 2022-07-05 PROCEDURE — 85025 COMPLETE CBC W/AUTO DIFF WBC: CPT | Performed by: INTERNAL MEDICINE

## 2022-07-05 PROCEDURE — 84100 ASSAY OF PHOSPHORUS: CPT | Performed by: INTERNAL MEDICINE

## 2022-07-05 PROCEDURE — 25010000002 RASBURICASE PER 0.5 MG: Performed by: INTERNAL MEDICINE

## 2022-07-05 PROCEDURE — 99232 SBSQ HOSP IP/OBS MODERATE 35: CPT | Performed by: INTERNAL MEDICINE

## 2022-07-05 PROCEDURE — 63710000001 INSULIN LISPRO (HUMAN) PER 5 UNITS: Performed by: INTERNAL MEDICINE

## 2022-07-05 PROCEDURE — 82962 GLUCOSE BLOOD TEST: CPT

## 2022-07-05 PROCEDURE — 85007 BL SMEAR W/DIFF WBC COUNT: CPT | Performed by: INTERNAL MEDICINE

## 2022-07-05 PROCEDURE — 99233 SBSQ HOSP IP/OBS HIGH 50: CPT | Performed by: INTERNAL MEDICINE

## 2022-07-05 PROCEDURE — 86850 RBC ANTIBODY SCREEN: CPT | Performed by: INTERNAL MEDICINE

## 2022-07-05 PROCEDURE — 63710000001 ONDANSETRON PER 8 MG: Performed by: HOSPITALIST

## 2022-07-05 PROCEDURE — 63710000001 INSULIN GLARGINE PER 5 UNITS: Performed by: INTERNAL MEDICINE

## 2022-07-05 PROCEDURE — 83880 ASSAY OF NATRIURETIC PEPTIDE: CPT | Performed by: NURSE PRACTITIONER

## 2022-07-05 RX ORDER — DIPHENHYDRAMINE HCL 25 MG
25 CAPSULE ORAL ONCE
Status: COMPLETED | OUTPATIENT
Start: 2022-07-05 | End: 2022-07-05

## 2022-07-05 RX ORDER — ARFORMOTEROL TARTRATE 15 UG/2ML
15 SOLUTION RESPIRATORY (INHALATION)
Status: DISCONTINUED | OUTPATIENT
Start: 2022-07-05 | End: 2022-07-09 | Stop reason: HOSPADM

## 2022-07-05 RX ORDER — BUDESONIDE 0.5 MG/2ML
1 INHALANT ORAL
Status: DISCONTINUED | OUTPATIENT
Start: 2022-07-05 | End: 2022-07-09 | Stop reason: HOSPADM

## 2022-07-05 RX ORDER — DIPHENHYDRAMINE HYDROCHLORIDE 50 MG/ML
25 INJECTION INTRAMUSCULAR; INTRAVENOUS ONCE
Status: COMPLETED | OUTPATIENT
Start: 2022-07-05 | End: 2022-07-05

## 2022-07-05 RX ORDER — DEXTROSE MONOHYDRATE 25 G/50ML
50 INJECTION, SOLUTION INTRAVENOUS ONCE
Status: COMPLETED | OUTPATIENT
Start: 2022-07-05 | End: 2022-07-05

## 2022-07-05 RX ORDER — CALCIUM GLUCONATE 20 MG/ML
1 INJECTION, SOLUTION INTRAVENOUS ONCE
Status: COMPLETED | OUTPATIENT
Start: 2022-07-05 | End: 2022-07-05

## 2022-07-05 RX ORDER — DEXTROSE MONOHYDRATE 25 G/50ML
50 INJECTION, SOLUTION INTRAVENOUS
Status: DISCONTINUED | OUTPATIENT
Start: 2022-07-05 | End: 2022-07-09 | Stop reason: HOSPADM

## 2022-07-05 RX ADMIN — INSULIN HUMAN 10 UNITS: 100 INJECTION, SOLUTION PARENTERAL at 13:50

## 2022-07-05 RX ADMIN — Medication 10 ML: at 08:41

## 2022-07-05 RX ADMIN — GUAIFENESIN 600 MG: 600 TABLET, EXTENDED RELEASE ORAL at 21:16

## 2022-07-05 RX ADMIN — OXYCODONE 10 MG: 5 TABLET ORAL at 04:20

## 2022-07-05 RX ADMIN — ALLOPURINOL 300 MG: 300 TABLET ORAL at 08:30

## 2022-07-05 RX ADMIN — OXYCODONE 10 MG: 5 TABLET ORAL at 12:39

## 2022-07-05 RX ADMIN — ALPRAZOLAM 0.25 MG: 0.25 TABLET ORAL at 21:16

## 2022-07-05 RX ADMIN — CITALOPRAM HYDROBROMIDE 10 MG: 20 TABLET ORAL at 08:39

## 2022-07-05 RX ADMIN — OXYCODONE 10 MG: 5 TABLET ORAL at 16:18

## 2022-07-05 RX ADMIN — INSULIN GLARGINE 18 UNITS: 100 INJECTION, SOLUTION SUBCUTANEOUS at 08:30

## 2022-07-05 RX ADMIN — ONDANSETRON HYDROCHLORIDE 4 MG: 4 TABLET, FILM COATED ORAL at 09:40

## 2022-07-05 RX ADMIN — DIPHENHYDRAMINE HYDROCHLORIDE 25 MG: 50 INJECTION, SOLUTION INTRAMUSCULAR; INTRAVENOUS at 13:48

## 2022-07-05 RX ADMIN — BUDESONIDE 0.5 MG: 0.5 INHALANT RESPIRATORY (INHALATION) at 19:40

## 2022-07-05 RX ADMIN — HYDROXYUREA 1000 MG: 500 CAPSULE ORAL at 08:40

## 2022-07-05 RX ADMIN — SODIUM ZIRCONIUM CYCLOSILICATE 10 G: 10 POWDER, FOR SUSPENSION ORAL at 21:16

## 2022-07-05 RX ADMIN — GABAPENTIN 300 MG: 300 CAPSULE ORAL at 21:15

## 2022-07-05 RX ADMIN — DEXTROSE MONOHYDRATE 50 ML: 25 INJECTION, SOLUTION INTRAVENOUS at 13:48

## 2022-07-05 RX ADMIN — FUROSEMIDE 20 MG: 10 INJECTION, SOLUTION INTRAMUSCULAR; INTRAVENOUS at 08:30

## 2022-07-05 RX ADMIN — CALCIUM GLUCONATE 1 G: 20 INJECTION, SOLUTION INTRAVENOUS at 14:05

## 2022-07-05 RX ADMIN — ARFORMOTEROL TARTRATE 15 MCG: 15 SOLUTION RESPIRATORY (INHALATION) at 19:40

## 2022-07-05 RX ADMIN — SODIUM CHLORIDE 3 MG: 9 INJECTION, SOLUTION INTRAVENOUS at 13:48

## 2022-07-05 RX ADMIN — TRAZODONE HYDROCHLORIDE 50 MG: 50 TABLET ORAL at 21:16

## 2022-07-05 RX ADMIN — INSULIN LISPRO 2 UNITS: 100 INJECTION, SOLUTION INTRAVENOUS; SUBCUTANEOUS at 12:40

## 2022-07-05 RX ADMIN — OXYCODONE 10 MG: 5 TABLET ORAL at 21:16

## 2022-07-05 RX ADMIN — SODIUM ZIRCONIUM CYCLOSILICATE 10 G: 10 POWDER, FOR SUSPENSION ORAL at 13:48

## 2022-07-05 RX ADMIN — INSULIN LISPRO 4 UNITS: 100 INJECTION, SOLUTION INTRAVENOUS; SUBCUTANEOUS at 07:49

## 2022-07-05 RX ADMIN — GUAIFENESIN 600 MG: 600 TABLET, EXTENDED RELEASE ORAL at 08:39

## 2022-07-05 RX ADMIN — SODIUM BICARBONATE: 84 INJECTION, SOLUTION INTRAVENOUS at 16:11

## 2022-07-05 RX ADMIN — INSULIN LISPRO 6 UNITS: 100 INJECTION, SOLUTION INTRAVENOUS; SUBCUTANEOUS at 12:39

## 2022-07-05 RX ADMIN — OXYCODONE 10 MG: 5 TABLET ORAL at 08:39

## 2022-07-05 RX ADMIN — INSULIN LISPRO 6 UNITS: 100 INJECTION, SOLUTION INTRAVENOUS; SUBCUTANEOUS at 07:49

## 2022-07-05 RX ADMIN — Medication 10 ML: at 21:16

## 2022-07-05 RX ADMIN — INSULIN HUMAN 10 UNITS: 100 INJECTION, SOLUTION PARENTERAL at 21:37

## 2022-07-05 RX ADMIN — GABAPENTIN 300 MG: 300 CAPSULE ORAL at 16:10

## 2022-07-05 RX ADMIN — SODIUM BICARBONATE: 84 INJECTION, SOLUTION INTRAVENOUS at 22:53

## 2022-07-05 RX ADMIN — GABAPENTIN 300 MG: 300 CAPSULE ORAL at 08:39

## 2022-07-05 RX ADMIN — CALCIUM GLUCONATE 1 G: 20 INJECTION, SOLUTION INTRAVENOUS at 21:16

## 2022-07-05 NOTE — PLAN OF CARE
Problem: Adult Inpatient Plan of Care  Goal: Plan of Care Review  Outcome: Ongoing, Progressing  Flowsheets (Taken 7/5/2022 0156)  Progress: no change  Plan of Care Reviewed With: patient  Outcome Evaluation: Patient rested throughout the night, no complaints at this time.  Goal: Patient-Specific Goal (Individualized)  Outcome: Ongoing, Progressing  Goal: Absence of Hospital-Acquired Illness or Injury  Outcome: Ongoing, Progressing  Intervention: Identify and Manage Fall Risk  Recent Flowsheet Documentation  Taken 7/5/2022 0007 by Alan Perry RN  Safety Promotion/Fall Prevention:   safety round/check completed   room organization consistent   nonskid shoes/slippers when out of bed   fall prevention program maintained   assistive device/personal items within reach   clutter free environment maintained   activity supervised  Taken 7/4/2022 2210 by Alan Perry RN  Safety Promotion/Fall Prevention: safety round/check completed  Taken 7/4/2022 1953 by Alan Perry RN  Safety Promotion/Fall Prevention:   safety round/check completed   room organization consistent   nonskid shoes/slippers when out of bed   clutter free environment maintained   assistive device/personal items within reach  Intervention: Prevent Skin Injury  Recent Flowsheet Documentation  Taken 7/4/2022 1953 by Alan Perry RN  Skin Protection:   adhesive use limited   protective footwear used   transparent dressing maintained  Intervention: Prevent and Manage VTE (Venous Thromboembolism) Risk  Recent Flowsheet Documentation  Taken 7/4/2022 1953 by Alan Perry RN  VTE Prevention/Management:   bilateral   sequential compression devices off  Intervention: Prevent Infection  Recent Flowsheet Documentation  Taken 7/5/2022 0007 by Alan Perry RN  Infection Prevention:   single patient room provided   rest/sleep promoted   personal protective equipment utilized   hand hygiene promoted  Taken 7/4/2022 1953 by Alan Perry  RN  Infection Prevention:   single patient room provided   rest/sleep promoted   personal protective equipment utilized   hand hygiene promoted  Goal: Optimal Comfort and Wellbeing  Outcome: Ongoing, Progressing  Intervention: Monitor Pain and Promote Comfort  Recent Flowsheet Documentation  Taken 7/4/2022 2141 by Alan Perry RN  Pain Management Interventions: see MAR  Taken 7/4/2022 1953 by Alan Perry RN  Pain Management Interventions: see MAR  Intervention: Provide Person-Centered Care  Recent Flowsheet Documentation  Taken 7/4/2022 1953 by Alan Perry RN  Trust Relationship/Rapport: care explained  Goal: Readiness for Transition of Care  Outcome: Ongoing, Progressing     Problem: Skin Injury Risk Increased  Goal: Skin Health and Integrity  Outcome: Ongoing, Progressing  Intervention: Optimize Skin Protection  Recent Flowsheet Documentation  Taken 7/4/2022 1953 by Alan Perry RN  Pressure Reduction Techniques: frequent weight shift encouraged  Pressure Reduction Devices: pressure-redistributing mattress utilized  Skin Protection:   adhesive use limited   protective footwear used   transparent dressing maintained   Goal Outcome Evaluation:  Plan of Care Reviewed With: patient        Progress: no change  Outcome Evaluation: Patient rested throughout the night, no complaints at this time.

## 2022-07-05 NOTE — CONSULTS
picc team consult:    Procedure explained to patient and patient given choice between midline catheter and peripheral IV insertion.  Desires ML.. power glide midline catheter placed RUE basilic vessel utilizing US guidance and sterile technique with easily compressible vessel without difficulty.  Dark venous blood return noted and line flushes without difficulty.

## 2022-07-05 NOTE — PROGRESS NOTES
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)    Acute systolic CHF (congestive heart failure) (HCC)    Menorrhagia      Assessment  Hypoxemia: Since the patient has severe leukocytosis the PO2 on ABGs are not reliable, pulse oximetry is more suitable to monitoring hypoxia    Severe bilateral groundglass opacity were noted on CT scan 6/4/2022 and thought to be due to TRALI but still persistent on CT scan 6/29/2022: Chest x-ray 7/3/2022 shows decrease in the bilateral ill-defined opacities: Elevated proBNP makes these opacity most likely to be pulmonary edema    Decompensated heart failure  CML with severe leukocytosis  Anemia  History of pulmonary embolism on home anticoagulation  History of hypertension  Diabetes mellitus type 2 with diabetic neuropathy  History of depression anxiety        Plan    Oxygen supplement and titration maintain saturation above 91%: Currently requiring 2L per nasal cannula: Monitor hypoxia with the pulse oximetry  Lasix  Mucinex  Blood pressure control  Anticoagulation: Xarelto  Glucose control  Followed by hematology                LOS: 6 days     Subjective     Patient reports breathing well.  Lungs have improved.  Follow-up with    Objective     Vital signs for last 24 hours:  Vitals:    07/04/22 1600 07/04/22 2337 07/05/22 0504 07/05/22 0729   BP: 101/67 97/58  99/58   BP Location: Right arm Right arm  Right arm   Patient Position:  Lying  Lying   Pulse: 98 103  89   Resp: 16 18  16   Temp: 98.1 °F (36.7 °C) 98 °F (36.7 °C)     TempSrc: Oral Oral     SpO2: 100% 97%  100%   Weight:   57.2 kg (126 lb)    Height:           Intake/Output last 3 shifts:  I/O last 3 completed shifts:  In: 1200 [P.O.:1200]  Out: -    Intake/Output this shift:  No intake/output data recorded.      Radiology  Imaging Results (Last 24 Hours)     Procedure Component Value Units Date/Time    US Pelvis Complete [[id number]] Collected: 07/04/22 1639     Updated: 07/04/22 1645    Narrative:      DATE OF EXAM:  7/4/2022 3:51 PM     PROCEDURE:  US NON-OB TRANSVAGINAL-, US PELVIS COMPLETE-     INDICATIONS:  menorrhagia; C92.10-Chronic myeloid leukemia, BCR/ABL-positive, not  having achieved remission; J18.9-Pneumonia, unspecified organism;  I50.9-Heart failure, unspecified     COMPARISON:  CT abdomen pelvis from 06/01/2022     TECHNIQUE:   Transvaginal pelvic ultrasound was performed.  Grayscale and color  Doppler imaging was utilized to evaluate the pelvic area with image  documentation per protocol.     FINDINGS:  The uterus measures 8.6 x 3.3 x 5.4 cm and contains a couple nonspecific  dystrophic calcifications, but is otherwise unremarkable. The  endometrial stripe thickness measures 7.6 mm. The right ovary measures  3.1 x 2.7 x 3.5 cm and contains a 2.3 cm cyst. The left ovary measures  2.3 x 2.6 x 2.0 cm and appears normal. Normal flow is seen within both  ovaries.       Impression:      1.Uterus and left adnexa are unremarkable.  2.2.3 cm right adnexal cyst, likely physiologic.     Electronically Signed By-Amador Larry MD On:7/4/2022 4:43 PM  This report was finalized on [id number] by  Amador Larry MD.    US Non-ob Transvaginal [[id number]] Collected: 07/04/22 1639     Updated: 07/04/22 1645    Narrative:      DATE OF EXAM:  7/4/2022 3:51 PM     PROCEDURE:  US NON-OB TRANSVAGINAL-, US PELVIS COMPLETE-     INDICATIONS:  menorrhagia; C92.10-Chronic myeloid leukemia, BCR/ABL-positive, not  having achieved remission; J18.9-Pneumonia, unspecified organism;  I50.9-Heart failure, unspecified     COMPARISON:  CT abdomen pelvis from 06/01/2022     TECHNIQUE:   Transvaginal pelvic ultrasound was performed.  Grayscale and color  Doppler imaging was  utilized to evaluate the pelvic area with image  documentation per protocol.     FINDINGS:  The uterus measures 8.6 x 3.3 x 5.4 cm and contains a couple nonspecific  dystrophic calcifications, but is otherwise unremarkable. The  endometrial stripe thickness measures 7.6 mm. The right ovary measures  3.1 x 2.7 x 3.5 cm and contains a 2.3 cm cyst. The left ovary measures  2.3 x 2.6 x 2.0 cm and appears normal. Normal flow is seen within both  ovaries.       Impression:      1.Uterus and left adnexa are unremarkable.  2.2.3 cm right adnexal cyst, likely physiologic.     Electronically Signed By-Amador Larry MD On:7/4/2022 4:43 PM  This report was finalized on [id number] by  Amador Larry MD.          Labs:  Results from last 7 days   Lab Units 07/04/22  0425   WBC 10*3/mm3 84.50*   HEMOGLOBIN g/dL 7.2*   HEMATOCRIT % 23.2*   PLATELETS 10*3/mm3 189     Results from last 7 days   Lab Units 07/03/22  0946   SODIUM mmol/L 134*   POTASSIUM mmol/L 4.8   CHLORIDE mmol/L 97*   CO2 mmol/L 24.0   BUN mg/dL 28*   CREATININE mg/dL 0.56*   CALCIUM mg/dL 8.9   BILIRUBIN mg/dL 0.5   ALK PHOS U/L 791*   ALT (SGPT) U/L 9   AST (SGOT) U/L 14   GLUCOSE mg/dL 341*     Results from last 7 days   Lab Units 07/03/22  1643   PH, ARTERIAL pH units 7.465*   PO2 ART mm Hg 48.2*   PCO2, ARTERIAL mm Hg 34.3*   HCO3 ART mmol/L 24.7     Results from last 7 days   Lab Units 07/03/22  0946 06/29/22  1533   ALBUMIN g/dL 3.50 3.50     Results from last 7 days   Lab Units 06/29/22  2343 06/29/22  1533   CK TOTAL U/L 18*  --    TROPONIN T ng/mL  --  <0.010             Results from last 7 days   Lab Units 06/29/22  1533   INR  1.22*   APTT seconds 35.7*               Meds:   SCHEDULE  allopurinol, 300 mg, Oral, Daily  citalopram, 10 mg, Oral, Daily  furosemide, 20 mg, Intravenous, BID  gabapentin, 300 mg, Oral, TID  guaiFENesin, 600 mg, Oral, Q12H  hydroxyurea, 1,000 mg, Oral, BID  insulin glargine, 18 Units, Subcutaneous, Daily  insulin lispro,  0-12 Units, Subcutaneous, TID AC  insulin lispro, 6 Units, Subcutaneous, TID With Meals  losartan, 50 mg, Oral, Q24H  metoprolol succinate XL, 25 mg, Oral, Daily  rivaroxaban, 20 mg, Oral, Daily With Dinner  sodium chloride, 10 mL, Intravenous, Q12H  traZODone, 50 mg, Oral, Nightly      Infusions     PRNs  •  acetaminophen **OR** acetaminophen **OR** acetaminophen  •  ALPRAZolam  •  aluminum-magnesium hydroxide-simethicone  •  dextrose  •  dextrose  •  glucagon (human recombinant)  •  glucagon (human recombinant)  •  guaiFENesin-codeine  •  melatonin  •  ondansetron **OR** ondansetron  •  oxyCODONE  •  sodium chloride  •  [COMPLETED] Insert peripheral IV **AND** sodium chloride  •  sodium chloride    Physical Exam:  Physical Exam  General Appearance:  Alert   HEENT:  Normocephalic, without obvious abnormality, Conjunctiva/corneas clear,.   Nares normal, no drainage     Neck:  Supple, symmetrical, trachea midline. No JVD.  Lungs /Chest wall:   Scattered crackles bilaterally, no acute respiratory distress, symmetrical wall movement.     Heart:  Regular rate and rhythm, S1 S2 normal  Abdomen: Soft, non-tender, no masses, no organomegaly.    Extremities: No edema, no clubbing or cyanosis      ROS  Review of Systems  Constitutional: Negative for chills, fever and positive for malaise/fatigue.   HENT: Negative.    Eyes: Negative.    Cardiovascular: Negative.    Respiratory: Positive for  shortness of breath.    Skin: Negative.    Musculoskeletal: Chronic pain  Gastrointestinal: Poor appetite  Genitourinary: Negative.    Neurological: Negative.    Psychiatric/Behavioral: Negative.        I have reviewed current clinicals.     Electronically signed by JP White, 07/05/22, 7:32 AM EDT.     The NP scribed the note for me, I have examined the patient and reviewed and verified the above findings and and I formulated the assessment and plan as documented

## 2022-07-05 NOTE — PROGRESS NOTES
Daily Progress Note    Patient Care Team:  Houston Oro MD as PCP - General (Family Medicine)  Meghann Lerma MD as Consulting Physician (Hematology and Oncology)    Chief Complaint: Follow-up type 2 diabetes    HPI: Patient seen and examined today.  Clinically doing well.  Past medical history includes CML, cardiomyopathy with CHF, prior PE.  Patient was seen for diabetes.  Started on Lantus and Humalog basal bolus insulin therapy yesterday.  She is eating fair.  No low blood sugars noted.    ROS:   Constitutional:  Denies fatigue, tiredness.    Respiratory: denies cough, shortness of breath.   Cardiovascular:  denies chest pain, edema   GI:  Denies abdominal pain, nausea, vomiting.         Vitals:    07/05/22 0915   BP: 92/48   Pulse: 104   Resp: 18   Temp: 97.6 °F (36.4 °C)   SpO2: 100%     Body mass index is 21.63 kg/m².    Physical Exam:  GEN: NAD, conversant   CV: RRR  LUNG: CTA  PSYCH: AOX3, appropriate mood, affect normal      Results Review:     I reviewed the patient's new clinical results.    Glucose   Date Value Ref Range Status   07/03/2022 341 (H) 65 - 99 mg/dL Final     Sodium   Date Value Ref Range Status   07/03/2022 134 (L) 136 - 145 mmol/L Final     Potassium   Date Value Ref Range Status   07/03/2022 4.8 3.5 - 5.2 mmol/L Final     CO2   Date Value Ref Range Status   07/03/2022 24.0 22.0 - 29.0 mmol/L Final     Chloride   Date Value Ref Range Status   07/03/2022 97 (L) 98 - 107 mmol/L Final     Anion Gap   Date Value Ref Range Status   07/03/2022 13.0 5.0 - 15.0 mmol/L Final     Creatinine   Date Value Ref Range Status   07/03/2022 0.56 (L) 0.57 - 1.00 mg/dL Final     BUN   Date Value Ref Range Status   07/03/2022 28 (H) 6 - 20 mg/dL Final     BUN/Creatinine Ratio   Date Value Ref Range Status   07/03/2022 50.0 (H) 7.0 - 25.0 Final     Calcium   Date Value Ref Range Status   07/03/2022 8.9 8.6 - 10.5 mg/dL Final     Alkaline Phosphatase   Date Value Ref Range Status    07/03/2022 791 (H) 39 - 117 U/L Final     Total Protein   Date Value Ref Range Status   07/03/2022 7.3 6.0 - 8.5 g/dL Final     ALT (SGPT)   Date Value Ref Range Status   07/03/2022 9 1 - 33 U/L Final     AST (SGOT)   Date Value Ref Range Status   07/03/2022 14 1 - 32 U/L Final     Total Bilirubin   Date Value Ref Range Status   07/03/2022 0.5 0.0 - 1.2 mg/dL Final     Albumin   Date Value Ref Range Status   07/03/2022 3.50 3.50 - 5.20 g/dL Final     Globulin   Date Value Ref Range Status   07/03/2022 3.8 gm/dL Final     Lab Results   Component Value Date    HGBA1C 8.4 (H) 06/29/2022    HGBA1C 10.2 (H) 06/02/2022    HGBA1C 10.8 (H) 01/13/2022     No results found for: GLUF, MICROALBUR  Results from last 7 days   Lab Units 07/05/22  0911 07/05/22  0728 07/04/22  2115 07/04/22  1634 07/04/22  1134 07/04/22  0717   GLUCOSE mg/dL 188* 211* 277* 249* 254* 223*             Medication Review: Reviewed.     allopurinol, 300 mg, Oral, Daily  citalopram, 10 mg, Oral, Daily  furosemide, 20 mg, Intravenous, BID  gabapentin, 300 mg, Oral, TID  guaiFENesin, 600 mg, Oral, Q12H  hydroxyurea, 1,000 mg, Oral, BID  insulin glargine, 18 Units, Subcutaneous, Daily  insulin lispro, 0-12 Units, Subcutaneous, TID AC  insulin lispro, 6 Units, Subcutaneous, TID With Meals  losartan, 50 mg, Oral, Q24H  metoprolol succinate XL, 25 mg, Oral, Daily  rivaroxaban, 20 mg, Oral, Daily With Dinner  sodium chloride, 10 mL, Intravenous, Q12H  traZODone, 50 mg, Oral, Nightly              Assessment and plan:  Diabetes mellitus type 2: Uncontrolled with hyperglycemia, she was started on Lantus 18 units daily in the morning along with 6 units with each meal.  She is also on Humalog sliding scale.  I will follow blood sugars and make adjustments as needed.       Cardiomyopathy: Followed by cardiology     Chronic myelogenous leukemia: Followed by oncology     PE: Currently on Xarelto and followed by primary team as well as pulmonary and critical care  medicine.    Dalton Garcia MD. FACE

## 2022-07-05 NOTE — PROGRESS NOTES
Hematology/Oncology Inpatient Progress Note    PATIENT NAME: Nieves Mc  : 1975  MRN: [id number]    CHIEF COMPLAINT: Dyspnea and cough    HISTORY OF PRESENT ILLNESS:      Nieves Mc is a 46 y.o. female who presented to [facility] on 2022 with complaints of worsening shortness of breath, cough,, chest pain for a week.  CT scan shows continued severe infiltrates unchanged from last CT scan.  Patient was also found to be in blast crisis with her history of CML and blasts of 27% on CBC.  Patient was admitted for further evaluation and treatment.     22  Hematology/Oncology was consulted for established patient with CML presenting with 27% blasts on CBC.  Patient is currently on Tasigna twice daily and Hydrea with history of noncompliance with medications and recent office visit with WBC of 200,000 as well as Hgb of 7.7 due to uncontrolled CML.  Patient was continued on Tasigna and Hydrea at office visit.  After presenting yesterday with blast crisis at 27%, her CBC today shows decreased blasts at 17%, and WBC of 205,000.    2022 -.6, hemoglobin 7.6, platelet 222.     2022: No major changes in her symptoms. She had clear evidence of tumor lysis syndrome. In spite of the institution of allopurinol, her uric acid had increased further. She had hyperkalemia and hyperphosphatemia. Rasburicase was prescribed. She was seen by Nephrology.      Subjective   Persists with fatigue, but not worse. Her dyspnea is improved but she is still coughing. No abdominal pain and no diarrhea.     ROS:  Review of Systems   Constitutional: Positive for fatigue. Negative for activity change, appetite change, chills, diaphoresis, fever and unexpected weight change.   HENT: Negative for congestion, dental problem, drooling, ear discharge, ear pain, facial swelling, hearing loss, mouth sores, nosebleeds, postnasal drip, rhinorrhea, sinus pressure, sinus pain, sneezing, sore throat, tinnitus,  trouble swallowing and voice change.    Eyes: Negative for photophobia, pain, discharge, redness, itching and visual disturbance.   Respiratory: Positive for cough. Negative for apnea, choking, chest tightness, shortness of breath, wheezing and stridor.    Cardiovascular: Negative for chest pain, palpitations and leg swelling.   Gastrointestinal: Negative for abdominal distention, abdominal pain, anal bleeding, blood in stool, constipation, diarrhea, nausea, rectal pain and vomiting.   Endocrine: Negative for cold intolerance, heat intolerance, polydipsia, polyphagia and polyuria.   Genitourinary: Negative for decreased urine volume, difficulty urinating, dysuria, flank pain, frequency, genital sores, hematuria and urgency.   Musculoskeletal: Negative for arthralgias, back pain, gait problem, joint swelling, myalgias, neck pain and neck stiffness.   Skin: Negative for color change, pallor and rash.   Neurological: Negative for dizziness, tremors, seizures, syncope, facial asymmetry, speech difficulty, weakness, light-headedness, numbness and headaches.   Hematological: Negative for adenopathy. Does not bruise/bleed easily.   Psychiatric/Behavioral: Negative for agitation, behavioral problems, confusion, decreased concentration, hallucinations, self-injury, sleep disturbance and suicidal ideas. The patient is not nervous/anxious.       MEDICATIONS:    Scheduled Meds:  citalopram, 10 mg, Oral, Daily  gabapentin, 300 mg, Oral, TID  guaiFENesin, 600 mg, Oral, Q12H  insulin glargine, 18 Units, Subcutaneous, Daily  metoprolol succinate XL, 25 mg, Oral, Daily  sodium chloride, 10 mL, Intravenous, Q12H  sodium zirconium cyclosilicate, 10 g, Oral, TID  traZODone, 50 mg, Oral, Nightly       Continuous Infusions:  custom IV infusion builder,        PRN Meds:  •  acetaminophen **OR** acetaminophen **OR** acetaminophen  •  ALPRAZolam  •  aluminum-magnesium hydroxide-simethicone  •  dextrose  •  dextrose  •  glucagon (human  "recombinant)  •  glucagon (human recombinant)  •  guaiFENesin-codeine  •  melatonin  •  ondansetron **OR** ondansetron  •  oxyCODONE  •  sodium chloride  •  [COMPLETED] Insert peripheral IV **AND** sodium chloride  •  sodium chloride     ALLERGIES:    Allergies   Allergen Reactions   • Hydromorphone GI Intolerance     dilaudid   • Morphine Nausea And Vomiting   • Propofol Hives     Previously tolerated being premedicated with diphenhydramine and famotadine     Objective    VITALS:   BP 92/48 (BP Location: Right arm, Patient Position: Lying)   Pulse 104   Temp 97.6 °F (36.4 °C) (Oral)   Resp 18   Ht 162.6 cm (64\")   Wt 57.2 kg (126 lb)   LMP 05/25/2022 (Approximate)   SpO2 100%   BMI 21.63 kg/m²     PHYSICAL EXAM: (performed by MD)  Physical Exam  Constitutional:       General: She is not in acute distress.     Appearance: Normal appearance. She is not ill-appearing, toxic-appearing or diaphoretic.   HENT:      Head: Normocephalic and atraumatic.      Right Ear: External ear normal.      Left Ear: External ear normal.      Nose: Nose normal.      Mouth/Throat:      Mouth: Mucous membranes are moist.      Pharynx: Oropharynx is clear. No oropharyngeal exudate or posterior oropharyngeal erythema.   Eyes:      General: No scleral icterus.        Right eye: No discharge.         Left eye: No discharge.      Conjunctiva/sclera: Conjunctivae normal.      Pupils: Pupils are equal, round, and reactive to light.   Cardiovascular:      Rate and Rhythm: Normal rate and regular rhythm.      Pulses: Normal pulses.      Heart sounds: Normal heart sounds. No murmur heard.    No friction rub. No gallop.   Pulmonary:      Effort: Pulmonary effort is normal. No respiratory distress.      Breath sounds: No stridor. Rhonchi present. No wheezing or rales.   Abdominal:      General: Abdomen is flat. Bowel sounds are normal. There is distension.      Palpations: Abdomen is soft. There is no mass.      Tenderness: There is no " abdominal tenderness. There is no right CVA tenderness, left CVA tenderness, guarding or rebound.      Hernia: No hernia is present.   Musculoskeletal:         General: No swelling, tenderness, deformity or signs of injury. Normal range of motion.      Cervical back: Normal range of motion and neck supple. No rigidity.      Right lower leg: No edema.      Left lower leg: No edema.   Lymphadenopathy:      Cervical: No cervical adenopathy.      Upper Body:      Right upper body: No pectoral adenopathy.   Skin:     General: Skin is warm.      Coloration: Skin is not jaundiced.      Findings: No bruising, lesion or rash.   Neurological:      General: No focal deficit present.      Mental Status: She is alert and oriented to person, place, and time.      Cranial Nerves: No cranial nerve deficit.      Motor: No weakness.      Gait: Gait normal.   Psychiatric:         Mood and Affect: Mood normal.         Behavior: Behavior normal.         Thought Content: Thought content normal.         Judgment: Judgment normal.     JAMEL Truong MD performed the physical exam on 7/5/2022 as documented above.     RECENT LABS:  Lab Results (last 24 hours)     Procedure Component Value Units Date/Time    Path Consult Reflex [[id number]] Collected: 07/05/22 1105    Specimen: Blood Updated: 07/05/22 1230     Pathology Review Yes    Manual Differential [[id number]]  (Abnormal) Collected: 07/05/22 1105    Specimen: Blood Updated: 07/05/22 1230     Neutrophil % 50.0 %      Lymphocyte % 9.0 %      Monocyte % 3.0 %      Eosinophil % 4.0 %      Basophil % 16.0 %      Bands %  6.0 %      Metamyelocyte % 2.0 %      Myelocyte % 1.0 %      Promyelocyte % 3.0 %      Blasts % 6.0 %      Neutrophils Absolute 39.70 10*3/mm3      Lymphocytes Absolute 6.38 10*3/mm3      Monocytes Absolute 2.13 10*3/mm3      Eosinophils Absolute 2.84 10*3/mm3      Basophils Absolute 11.34 10*3/mm3      Anisocytosis Slight/1+     WBC Morphology Normal     Platelet  Morphology Normal    CBC & Differential [[id number]]  (Abnormal) Collected: 07/05/22 1105    Specimen: Blood Updated: 07/05/22 1230    Narrative:      The following orders were created for panel order CBC & Differential.  Procedure                               Abnormality         Status                     ---------                               -----------         ------                     CBC Auto Differential[[id number]]        Abnormal            Final result               Scan Slide[[id number]]                                       Final result                 Please view results for these tests on the individual orders.    Scan Slide [[id number]] Collected: 07/05/22 1105    Specimen: Blood Updated: 07/05/22 1230     Scan Slide --     Comment: See Manual Differential Results       CBC Auto Differential [[id number]]  (Abnormal) Collected: 07/05/22 1105    Specimen: Blood Updated: 07/05/22 1230     WBC 70.90 10*3/mm3      RBC 2.72 10*6/mm3      Hemoglobin 6.7 g/dL      Hematocrit 22.3 %      MCV 81.8 fL      MCH 24.8 pg      MCHC 30.3 g/dL      RDW 22.2 %      RDW-SD 62.1 fl      MPV 7.7 fL      Platelets 169 10*3/mm3     Narrative:      Modified report. Previous result was Hemogram on 7/5/2022 at 1152 EDT.  The previously reported component NRBC is no longer being reported. Previous result was 0.6 /100 WBC (Reference Range: 0.0-0.2 /100 WBC) on 7/5/2022 at 1152 EDT.    Uric Acid [[id number]]  (Abnormal) Collected: 07/05/22 1105    Specimen: Blood Updated: 07/05/22 1221     Uric Acid 13.0 mg/dL     POC Glucose Once [[id number]]  (Abnormal) Collected: 07/05/22 1208    Specimen: Blood Updated: 07/05/22 1208     Glucose 181 mg/dL      Comment: Serial Number: [id number]:  [id number]       Pathology Consultation [[id number]] Collected: 07/04/22 0425    Specimen: Blood, Venous Line Updated: 07/05/22 1208     Final Diagnosis --     Marked leukocytosis with left shift and numerous circulating blasts  Peripheral  basophilia  Normocytic anemia    Comment: Patient has clinical history of CML. Recommend flow cytometry and bone marrow biopsy.           Case Report --     Surgical Pathology Report                         Case: [id number]                                  Authorizing Provider:  Braden Huang MD       Collected:           07/04/2022 04:25 AM          Ordering Location:     [address]    Received:            07/05/2022 07:25 AM                                 MEDICAL INPATIENT                                                            Pathologist:           Max Liz MD                                                             Specimen:    Blood, Venous Line                                                                         Comprehensive Metabolic Panel [[id number]]  (Abnormal) Collected: 07/05/22 1105    Specimen: Blood Updated: 07/05/22 1159     Glucose 184 mg/dL      BUN 61 mg/dL      Creatinine 0.94 mg/dL      Sodium 130 mmol/L      Potassium 7.1 mmol/L      Comment: Result checked         Chloride 95 mmol/L      CO2 23.0 mmol/L      Calcium 8.6 mg/dL      Total Protein 7.1 g/dL      Albumin 3.60 g/dL      ALT (SGPT) 9 U/L      AST (SGOT) 11 U/L      Alkaline Phosphatase 672 U/L      Total Bilirubin 0.3 mg/dL      Globulin 3.5 gm/dL      A/G Ratio 1.0 g/dL      BUN/Creatinine Ratio 64.9     Anion Gap 12.0 mmol/L      eGFR 75.9 mL/min/1.73      Comment: National Kidney Foundation and American Society of Nephrology (ASN) Task Force recommended calculation based on the Chronic Kidney Disease Epidemiology Collaboration (CKD-EPI) equation refit without adjustment for race.       Narrative:      GFR Normal >60  Chronic Kidney Disease <60  Kidney Failure <15      Comprehensive Metabolic Panel [[id number]]  (Abnormal) Collected: 07/05/22 0908    Specimen: Blood Updated: 07/05/22 1042     Glucose 193 mg/dL      BUN 59 mg/dL      Creatinine 0.99 mg/dL      Sodium 132 mmol/L      Potassium 5.3  mmol/L      Chloride 96 mmol/L      CO2 20.0 mmol/L      Calcium 8.9 mg/dL      Total Protein 7.9 g/dL      Albumin 3.90 g/dL      ALT (SGPT) 9 U/L      AST (SGOT) 14 U/L      Alkaline Phosphatase 720 U/L      Total Bilirubin 0.4 mg/dL      Globulin 4.0 gm/dL      A/G Ratio 1.0 g/dL      BUN/Creatinine Ratio 59.6     Anion Gap 16.0 mmol/L      eGFR 71.4 mL/min/1.73      Comment: National Kidney Foundation and American Society of Nephrology (ASN) Task Force recommended calculation based on the Chronic Kidney Disease Epidemiology Collaboration (CKD-EPI) equation refit without adjustment for race.       Narrative:      GFR Normal >60  Chronic Kidney Disease <60  Kidney Failure <15      BNP [[id number]]  (Abnormal) Collected: 07/05/22 0908    Specimen: Blood Updated: 07/05/22 1010     proBNP 858.8 pg/mL     Narrative:      Among patients with dyspnea, NT-proBNP is highly sensitive for the detection of acute congestive heart failure. In addition NT-proBNP of <300 pg/ml effectively rules out acute congestive heart failure with 99% negative predictive value.    Results may be falsely decreased if patient taking Biotin.      Phosphorus [[id number]]  (Abnormal) Collected: 07/05/22 0908    Specimen: Blood Updated: 07/05/22 0959     Phosphorus 7.1 mg/dL     POC Glucose Once [[id number]]  (Abnormal) Collected: 07/05/22 0911    Specimen: Blood Updated: 07/05/22 0912     Glucose 188 mg/dL      Comment: Serial Number: [id number]:  [id number]       POC Glucose Once [[id number]]  (Abnormal) Collected: 07/05/22 0728    Specimen: Blood Updated: 07/05/22 0729     Glucose 211 mg/dL      Comment: Serial Number: [id number]:  [id number]       POC Glucose Once [[id number]]  (Abnormal) Collected: 07/04/22 2115    Specimen: Blood Updated: 07/04/22 2115     Glucose 277 mg/dL      Comment: Serial Number: [id number]:  [id number]       POC Glucose Once [[id number]]  (Abnormal) Collected: 07/04/22 1634    Specimen: Blood Updated:  07/04/22 1635     Glucose 249 mg/dL      Comment: Serial Number: [id number]:  [id number]       Blood Culture - Blood, Arm, Left [[id number]]  (Normal) Collected: 06/29/22 1625    Specimen: Blood from Arm, Left Updated: 07/04/22 1633     Blood Culture No growth at 5 days          IMAGING REVIEWED:  US Non-ob Transvaginal    Result Date: 7/4/2022  1.Uterus and left adnexa are unremarkable. 2.2.3 cm right adnexal cyst, likely physiologic.  Electronically Signed ByOra Larry MD On:7/4/2022 4:43 PM This report was finalized on [id number] by  Amador Larry MD.    US Pelvis Complete    Result Date: 7/4/2022  1.Uterus and left adnexa are unremarkable. 2.2.3 cm right adnexal cyst, likely physiologic.  Electronically Signed ByOra Larry MD On:7/4/2022 4:43 PM This report was finalized on [id number] by  Amador Larry MD.    XR Chest 1 View    Result Date: 7/3/2022  1.Hazy bilateral airspace opacities, which could reflect pulmonary edema or pneumonia. 2.Cardiomegaly.  Electronically Signed ByOra Larry MD On:7/3/2022 6:09 PM This report was finalized on [id number] by  Amador Larry MD.      Assessment & Plan   ASSESSMENT:  1. Tumor lysis syndrome. Start treatment with rasburicase. The ability to hydrate is limited by her poor ejection fraction. Over the week end she was placed on fluid restriction. Nephrology was consulted. Might need transient dialysis.   2. Chronic myelogenous leukemia. Interestingly 3 days of hydroxyurea, since the admission, has resulted in rapid reduction in the white cell count. This confirms that she had not been taking it at home, in spite of her reports.   3. Lung disease. No obvious worsening.   4. I have discussed with Dr. Cervantes for coordination of care. Discussed with nursing, as well.     PLAN:  1. As above.       Yifan Truong MD on 7/5/2022 at 15:59

## 2022-07-05 NOTE — PROGRESS NOTES
[facility] [place] [facility]   PROGRESS NOTE    Reason for follow-up: non-ischemic cardiomyopathy     Patient Care Team:  Houston Oro MD as PCP - General (Family Medicine)  Meghann Lerma MD as Consulting Physician (Hematology and Oncology)    Subjective    Patient feeling poorly all weekend. No chest pain but dizzy. Passing very large blood clots vaginally. Requiring oxygen with any movement. Did have episodes of low blood pressure SBP in low 90s.     Labs reviewed , Hgb 7.2     Review of Systems   Constitutional: Positive for malaise/fatigue. Negative for diaphoresis and fever.   Cardiovascular: Positive for dyspnea on exertion. Negative for chest pain, irregular heartbeat and near-syncope.   Respiratory: Positive for shortness of breath. Negative for cough.    Gastrointestinal: Negative for nausea and vomiting.   Genitourinary: Positive for non-menstrual bleeding.   Neurological: Positive for dizziness. Negative for light-headedness.   All other systems reviewed and are negative.      Allergies:  Hydromorphone, Morphine, and Propofol    Scheduled Meds:  citalopram, 10 mg, Oral, Daily  gabapentin, 300 mg, Oral, TID  guaiFENesin, 600 mg, Oral, Q12H  insulin glargine, 18 Units, Subcutaneous, Daily  metoprolol succinate XL, 25 mg, Oral, Daily  sodium chloride, 10 mL, Intravenous, Q12H  sodium zirconium cyclosilicate, 10 g, Oral, TID  traZODone, 50 mg, Oral, Nightly      Continuous Infusions:  custom IV infusion builder, , Last Rate: 150 mL/hr at 07/05/22 1611      PRN Meds:  •  acetaminophen **OR** acetaminophen **OR** acetaminophen  •  ALPRAZolam  •  aluminum-magnesium hydroxide-simethicone  •  dextrose  •  dextrose  •  glucagon (human recombinant)  •  glucagon (human recombinant)  •  guaiFENesin-codeine  •  melatonin  •  ondansetron **OR** ondansetron  •  oxyCODONE  •  sodium chloride  •  [COMPLETED] Insert peripheral IV **AND** sodium chloride  •  sodium  "chloride    Objective     VITAL SIGNS  Vitals:    07/04/22 2337 07/05/22 0504 07/05/22 0729 07/05/22 0915   BP: 97/58  99/58 92/48   BP Location: Right arm  Right arm Right arm   Patient Position: Lying  Lying Lying   Pulse: 103  89 104   Resp: 18  16 18   Temp: 98 °F (36.7 °C)   97.6 °F (36.4 °C)   TempSrc: Oral   Oral   SpO2: 97%  100% 100%   Weight:  57.2 kg (126 lb)     Height:         Flowsheet Rows    Flowsheet Row First Filed Value   Admission Height 162.6 cm (64\") Documented at 06/29/2022 1500   Admission Weight 54.4 kg (120 lb) Documented at 06/29/2022 1500           TELEMETRY: sinus tachycardia    Physical Exam:  Vitals reviewed.   Constitutional:       General: Awake.      Appearance: Normal weight. Frail. Chronically ill-appearing.      Interventions: Nasal cannula in place.   Pulmonary:      Effort: Pulmonary effort is normal.      Breath sounds: Normal breath sounds.   Cardiovascular:      Tachycardia present. Regular rhythm. Normal S1. Normal S2.      Murmurs: There is no murmur.   Pulses:     Intact distal pulses.   Edema:     Peripheral edema absent.   Abdominal:      General: Bowel sounds are normal.   Skin:     General: Skin is warm and dry.   Neurological:      General: No focal deficit present.      Mental Status: Alert and oriented to person, place and time.   Psychiatric:         Behavior: Behavior is cooperative.          LAB RESULTS (LAST 7 DAYS)  I have reviewed new clinical results.    CBC  Results from last 7 days   Lab Units 07/05/22  1105 07/04/22  0425 07/03/22  1853 07/02/22  0822 06/29/22  2343 06/29/22  1533   WBC 10*3/mm3 70.90* 84.50*  --  101.60* 205.90* 201.00*   RBC 10*6/mm3 2.72* 2.86*  --  3.12* 3.19* 3.21*   HEMOGLOBIN g/dL 6.7* 7.2* 7.6* 7.6* 8.0* 8.5*   HEMATOCRIT % 22.3* 23.2* 25.6* 25.3* 25.7* 25.7*   MCV fL 81.8 81.1  --  81.1 80.6 80.1   PLATELETS 10*3/mm3 169 189  --  222 291 293     CMP   Results from last 7 days   Lab Units 07/05/22  1105 07/05/22  0908 " 07/03/22  0946 06/29/22  2343 06/29/22  1533   SODIUM mmol/L 130* 132* 134* 133* 134*   POTASSIUM mmol/L 7.1* 5.3* 4.8 3.4* 3.9   CHLORIDE mmol/L 95* 96* 97* 94* 97*   CO2 mmol/L 23.0 20.0* 24.0 24.0 23.0   BUN mg/dL 61* 59* 28* 10 8   CREATININE mg/dL 0.94 0.99 0.56* 0.65 0.60   GLUCOSE mg/dL 184* 193* 341* 325* 275*   ALBUMIN g/dL 3.60 3.90 3.50  --  3.50   BILIRUBIN mg/dL 0.3 0.4 0.5  --  1.4*   ALK PHOS U/L 672* 720* 791*  --  985*   AST (SGOT) U/L 11 14 14  --  22   ALT (SGPT) U/L 9 9 9  --  14   LIPASE U/L  --   --   --   --  10*     ProBNP      TROPONIN  Results from last 7 days   Lab Units 06/29/22  2343 06/29/22  1533   CK TOTAL U/L 18*  --    TROPONIN T ng/mL  --  <0.010     CoAg  Results from last 7 days   Lab Units 06/29/22  1533   INR  1.22*   APTT seconds 35.7*     Creatinine Clearance  Estimated Creatinine Clearance: 67.5 mL/min (by C-G formula based on SCr of 0.94 mg/dL).    Radiology  US Non-ob Transvaginal    Result Date: 7/4/2022  1.Uterus and left adnexa are unremarkable. 2.2.3 cm right adnexal cyst, likely physiologic.  Electronically Signed By-Amador Larry MD On:7/4/2022 4:43 PM This report was finalized on [id number] by  Amador Larry MD.    US Pelvis Complete    Result Date: 7/4/2022  1.Uterus and left adnexa are unremarkable. 2.2.3 cm right adnexal cyst, likely physiologic.  Electronically Signed By-Amador Larry MD On:7/4/2022 4:43 PM This report was finalized on [id number] by  Amador Larry MD.    XR Chest 1 View    Result Date: 7/3/2022  1.Hazy bilateral airspace opacities, which could reflect pulmonary edema or pneumonia. 2.Cardiomegaly.  Electronically Signed By-Amador Larry MD On:7/3/2022 6:09 PM This report was finalized on [id number] by  Amador Larry MD.      EKG    I personally viewed and interpreted the patient's EKG/Telemetry data:    ECHOCARDIOGRAM:  Results for orders placed during the hospital encounter of 06/29/22    Adult Transthoracic Echo  Complete W/ Cont if Necessary Per Protocol    Interpretation Summary  · The left ventricular cavity is mildly dilated.  · Left ventricular wall thickness is consistent with mild concentric hypertrophy.  · Left ventricular ejection fraction appears to be 41 - 45%.  · Left ventricular diastolic function is consistent with (grade Ia w/high LAP) impaired relaxation.      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  Further recommendations per Dr. Feldman.    JP Marquez  07/05/22  16:21 EDT   Electronically signed by JP Marquez, 07/05/22, 4:34 PM EDT.
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
## 2022-07-05 NOTE — CASE MANAGEMENT/SOCIAL WORK
Continued Stay Note  ZOHAIB Amor     Patient Name: Nieves Mc  MRN: [id number]  Today's Date: 7/5/2022    Admit Date: 6/29/2022     Discharge Plan     Row Name 07/05/22 1131       Plan    Plan D/C plan: Anticipate routine home with family, watch for oxygen needs.    Patient/Family in Agreement with Plan yes    Plan Comments Anticipate routine home with family at time of d/c. Watch for oxygen needs, no home oxygen. Barrier to d/c: Bone marrow biopsy planned for tomorrow.                   Expected Discharge Date and Time     Expected Discharge Date Expected Discharge Time    Jul 2, 2022         Phone communication or documentation only - no physical contact with patient or family.      JEAN FALCON RN

## 2022-07-05 NOTE — CONSULTS
RN notified me while I was on the floor that pt's midline would not draw blood back. I cleaned pt's cap, hooked up an empty 10 cc syringe up to pt's midline and successfully pulled back 5mls of blood. I immediately flushed the pt's midline with 10cc of NS. RN notified that midline draws back and is good to use at this time.

## 2022-07-05 NOTE — SIGNIFICANT NOTE
Patient is 46-year-old female with chronic myelogenous leukemia who appears to be in the tumor lysis syndrome.  The patient has severe anemia, hyper kalemia as well as renal failure.  I have consulted Dr. Andre for nephrology, spoke with Dr. Sutton of critical care because  the patient moved to the ICU.  Dr. Reis is excepted the patient in transfer.  The patient has been typed and screened for a unit of blood, has a midline in place but will be in need of dialysis catheter placement if she is to undergo dialysis.  Control has been notified of the change as has nursing.  The communication tree was completed and coordinated through Jes Mckenzie and Vishal.  The hospitalist service will sign off and see again at your request.

## 2022-07-06 ENCOUNTER — APPOINTMENT (OUTPATIENT)
Dept: GENERAL RADIOLOGY | Facility: HOSPITAL | Age: 47
End: 2022-07-06

## 2022-07-06 ENCOUNTER — APPOINTMENT (OUTPATIENT)
Dept: CT IMAGING | Facility: HOSPITAL | Age: 47
End: 2022-07-06

## 2022-07-06 LAB
ALBUMIN SERPL-MCNC: 3.1 G/DL (ref 3.5–5.2)
ALBUMIN/GLOB SERPL: 0.9 G/DL
ALP SERPL-CCNC: 527 U/L (ref 39–117)
ALT SERPL W P-5'-P-CCNC: 6 U/L (ref 1–33)
ANION GAP SERPL CALCULATED.3IONS-SCNC: 11 MMOL/L (ref 5–15)
ANISOCYTOSIS BLD QL: ABNORMAL
APTT PPP: 26.9 SECONDS (ref 24–31)
AST SERPL-CCNC: 7 U/L (ref 1–32)
BASOPHILS # BLD MANUAL: 6.57 10*3/MM3 (ref 0–0.2)
BASOPHILS NFR BLD MANUAL: 15 % (ref 0–1.5)
BILIRUB SERPL-MCNC: 0.3 MG/DL (ref 0–1.2)
BLASTS NFR BLD MANUAL: 10 % (ref 0–0)
BUN SERPL-MCNC: 45 MG/DL (ref 6–20)
BUN/CREAT SERPL: 61.6 (ref 7–25)
CALCIUM SPEC-SCNC: 8.5 MG/DL (ref 8.6–10.5)
CHLORIDE SERPL-SCNC: 100 MMOL/L (ref 98–107)
CK SERPL-CCNC: 10 U/L (ref 20–180)
CO2 SERPL-SCNC: 25 MMOL/L (ref 22–29)
CREAT SERPL-MCNC: 0.73 MG/DL (ref 0.57–1)
DEPRECATED RDW RBC AUTO: 58.2 FL (ref 37–54)
EGFRCR SERPLBLD CKD-EPI 2021: 102.9 ML/MIN/1.73
EOSINOPHIL # BLD MANUAL: 1.31 10*3/MM3 (ref 0–0.4)
EOSINOPHIL NFR BLD MANUAL: 3 % (ref 0.3–6.2)
ERYTHROCYTE [DISTWIDTH] IN BLOOD BY AUTOMATED COUNT: 20.8 % (ref 12.3–15.4)
GLOBULIN UR ELPH-MCNC: 3.4 GM/DL
GLUCOSE BLDC GLUCOMTR-MCNC: 202 MG/DL (ref 70–105)
GLUCOSE BLDC GLUCOMTR-MCNC: 211 MG/DL (ref 70–105)
GLUCOSE BLDC GLUCOMTR-MCNC: 231 MG/DL (ref 70–105)
GLUCOSE BLDC GLUCOMTR-MCNC: 306 MG/DL (ref 70–105)
GLUCOSE BLDC GLUCOMTR-MCNC: 314 MG/DL (ref 70–105)
GLUCOSE SERPL-MCNC: 220 MG/DL (ref 65–99)
HCT VFR BLD AUTO: 22.5 % (ref 34–46.6)
HGB BLD-MCNC: 7 G/DL (ref 12–15.9)
HOLD SPECIMEN: NORMAL
INR PPP: 1.09 (ref 0.93–1.1)
IRON 24H UR-MRATE: 90 MCG/DL (ref 37–145)
IRON SATN MFR SERPL: 25 % (ref 20–50)
LAB AP CASE REPORT: NORMAL
LYMPHOCYTES # BLD MANUAL: 8.32 10*3/MM3 (ref 0.7–3.1)
LYMPHOCYTES NFR BLD MANUAL: 2 % (ref 5–12)
MAGNESIUM SERPL-MCNC: 2.2 MG/DL (ref 1.6–2.6)
MCH RBC QN AUTO: 24.9 PG (ref 26.6–33)
MCHC RBC AUTO-ENTMCNC: 30.9 G/DL (ref 31.5–35.7)
MCV RBC AUTO: 80.7 FL (ref 79–97)
METAMYELOCYTES NFR BLD MANUAL: 10 % (ref 0–0)
MICROCYTES BLD QL: ABNORMAL
MONOCYTES # BLD: 0.88 10*3/MM3 (ref 0.1–0.9)
MYELOCYTES NFR BLD MANUAL: 5 % (ref 0–0)
NEUTROPHILS # BLD AUTO: 15.77 10*3/MM3 (ref 1.7–7)
NEUTROPHILS NFR BLD MANUAL: 34 % (ref 42.7–76)
NEUTS BAND NFR BLD MANUAL: 2 % (ref 0–5)
NRBC SPEC MANUAL: 2 /100 WBC (ref 0–0.2)
PATH REPORT.FINAL DX SPEC: NORMAL
PHOSPHATE SERPL-MCNC: 5.4 MG/DL (ref 2.5–4.5)
PLAT MORPH BLD: NORMAL
PLATELET # BLD AUTO: 145 10*3/MM3 (ref 140–450)
PMV BLD AUTO: 7.5 FL (ref 6–12)
POIKILOCYTOSIS BLD QL SMEAR: ABNORMAL
POTASSIUM SERPL-SCNC: 4.9 MMOL/L (ref 3.5–5.2)
PROT SERPL-MCNC: 6.5 G/DL (ref 6–8.5)
PROTHROMBIN TIME: 11.2 SECONDS (ref 9.6–11.7)
RBC # BLD AUTO: 2.79 10*6/MM3 (ref 3.77–5.28)
SCAN SLIDE: NORMAL
SODIUM SERPL-SCNC: 136 MMOL/L (ref 136–145)
TIBC SERPL-MCNC: 359 MCG/DL (ref 298–536)
TRANSFERRIN SERPL-MCNC: 241 MG/DL (ref 200–360)
URATE SERPL-MCNC: 6.4 MG/DL (ref 2.4–5.7)
VARIANT LYMPHS NFR BLD MANUAL: 19 % (ref 19.6–45.3)
WBC MORPH BLD: NORMAL
WBC NRBC COR # BLD: 43.8 10*3/MM3 (ref 3.4–10.8)

## 2022-07-06 PROCEDURE — 25010000002 DIPHENHYDRAMINE PER 50 MG

## 2022-07-06 PROCEDURE — 83735 ASSAY OF MAGNESIUM: CPT | Performed by: NURSE PRACTITIONER

## 2022-07-06 PROCEDURE — 88341 IMHCHEM/IMCYTCHM EA ADD ANTB: CPT

## 2022-07-06 PROCEDURE — 84550 ASSAY OF BLOOD/URIC ACID: CPT | Performed by: INTERNAL MEDICINE

## 2022-07-06 PROCEDURE — 88264 CHROMOSOME ANALYSIS 20-25: CPT

## 2022-07-06 PROCEDURE — 0 LIDOCAINE 1 % SOLUTION: Performed by: RADIOLOGY

## 2022-07-06 PROCEDURE — 99232 SBSQ HOSP IP/OBS MODERATE 35: CPT | Performed by: INTERNAL MEDICINE

## 2022-07-06 PROCEDURE — 81207 BCR/ABL1 GENE MINOR BP: CPT

## 2022-07-06 PROCEDURE — 88305 TISSUE EXAM BY PATHOLOGIST: CPT | Performed by: INTERNAL MEDICINE

## 2022-07-06 PROCEDURE — 81206 BCR/ABL1 GENE MAJOR BP: CPT

## 2022-07-06 PROCEDURE — 25010000002 MIDAZOLAM PER 1 MG: Performed by: RADIOLOGY

## 2022-07-06 PROCEDURE — 63710000001 INSULIN LISPRO (HUMAN) PER 5 UNITS: Performed by: INTERNAL MEDICINE

## 2022-07-06 PROCEDURE — 85007 BL SMEAR W/DIFF WBC COUNT: CPT | Performed by: INTERNAL MEDICINE

## 2022-07-06 PROCEDURE — 88312 SPECIAL STAINS GROUP 1: CPT | Performed by: INTERNAL MEDICINE

## 2022-07-06 PROCEDURE — 80053 COMPREHEN METABOLIC PANEL: CPT | Performed by: INTERNAL MEDICINE

## 2022-07-06 PROCEDURE — 88313 SPECIAL STAINS GROUP 2: CPT | Performed by: INTERNAL MEDICINE

## 2022-07-06 PROCEDURE — 99152 MOD SED SAME PHYS/QHP 5/>YRS: CPT

## 2022-07-06 PROCEDURE — 63710000001 INSULIN LISPRO (HUMAN) PER 5 UNITS: Performed by: NURSE PRACTITIONER

## 2022-07-06 PROCEDURE — 88311 DECALCIFY TISSUE: CPT | Performed by: INTERNAL MEDICINE

## 2022-07-06 PROCEDURE — 85730 THROMBOPLASTIN TIME PARTIAL: CPT | Performed by: RADIOLOGY

## 2022-07-06 PROCEDURE — 85610 PROTHROMBIN TIME: CPT | Performed by: RADIOLOGY

## 2022-07-06 PROCEDURE — 88342 IMHCHEM/IMCYTCHM 1ST ANTB: CPT

## 2022-07-06 PROCEDURE — 84100 ASSAY OF PHOSPHORUS: CPT | Performed by: NURSE PRACTITIONER

## 2022-07-06 PROCEDURE — 82550 ASSAY OF CK (CPK): CPT | Performed by: INTERNAL MEDICINE

## 2022-07-06 PROCEDURE — 88313 SPECIAL STAINS GROUP 2: CPT

## 2022-07-06 PROCEDURE — 86902 BLOOD TYPE ANTIGEN DONOR EA: CPT

## 2022-07-06 PROCEDURE — 84466 ASSAY OF TRANSFERRIN: CPT | Performed by: NURSE PRACTITIONER

## 2022-07-06 PROCEDURE — 88323 CONSLTJ&REPRT MATRL PREP SLD: CPT

## 2022-07-06 PROCEDURE — 82962 GLUCOSE BLOOD TEST: CPT

## 2022-07-06 PROCEDURE — 88237 TISSUE CULTURE BONE MARROW: CPT

## 2022-07-06 PROCEDURE — 83540 ASSAY OF IRON: CPT | Performed by: NURSE PRACTITIONER

## 2022-07-06 PROCEDURE — 25010000002 FENTANYL CITRATE (PF) 50 MCG/ML SOLUTION: Performed by: RADIOLOGY

## 2022-07-06 PROCEDURE — 88184 FLOWCYTOMETRY/ TC 1 MARKER: CPT

## 2022-07-06 PROCEDURE — 94799 UNLISTED PULMONARY SVC/PX: CPT

## 2022-07-06 PROCEDURE — 25010000002 DIPHENHYDRAMINE PER 50 MG: Performed by: RADIOLOGY

## 2022-07-06 PROCEDURE — 63710000001 INSULIN GLARGINE PER 5 UNITS: Performed by: INTERNAL MEDICINE

## 2022-07-06 PROCEDURE — 94664 DEMO&/EVAL PT USE INHALER: CPT

## 2022-07-06 PROCEDURE — 71045 X-RAY EXAM CHEST 1 VIEW: CPT

## 2022-07-06 PROCEDURE — 77012 CT SCAN FOR NEEDLE BIOPSY: CPT

## 2022-07-06 PROCEDURE — 85025 COMPLETE CBC W/AUTO DIFF WBC: CPT | Performed by: INTERNAL MEDICINE

## 2022-07-06 PROCEDURE — 25010000002 ONDANSETRON PER 1 MG: Performed by: RADIOLOGY

## 2022-07-06 PROCEDURE — 07DR3ZX EXTRACTION OF ILIAC BONE MARROW, PERCUTANEOUS APPROACH, DIAGNOSTIC: ICD-10-PCS | Performed by: PATHOLOGY

## 2022-07-06 PROCEDURE — 88185 FLOWCYTOMETRY/TC ADD-ON: CPT

## 2022-07-06 RX ORDER — INSULIN LISPRO 100 [IU]/ML
0-9 INJECTION, SOLUTION INTRAVENOUS; SUBCUTANEOUS
Status: DISCONTINUED | OUTPATIENT
Start: 2022-07-06 | End: 2022-07-06

## 2022-07-06 RX ORDER — INSULIN LISPRO 100 [IU]/ML
0-24 INJECTION, SOLUTION INTRAVENOUS; SUBCUTANEOUS AS NEEDED
Status: DISCONTINUED | OUTPATIENT
Start: 2022-07-06 | End: 2022-07-06

## 2022-07-06 RX ORDER — DIPHENHYDRAMINE HYDROCHLORIDE 50 MG/ML
INJECTION INTRAMUSCULAR; INTRAVENOUS
Status: COMPLETED | OUTPATIENT
Start: 2022-07-06 | End: 2022-07-06

## 2022-07-06 RX ORDER — INSULIN LISPRO 100 [IU]/ML
6 INJECTION, SOLUTION INTRAVENOUS; SUBCUTANEOUS
Status: DISCONTINUED | OUTPATIENT
Start: 2022-07-06 | End: 2022-07-09 | Stop reason: HOSPADM

## 2022-07-06 RX ORDER — ALLOPURINOL 100 MG/1
100 TABLET ORAL DAILY
Status: DISCONTINUED | OUTPATIENT
Start: 2022-07-06 | End: 2022-07-09 | Stop reason: HOSPADM

## 2022-07-06 RX ORDER — DIPHENHYDRAMINE HYDROCHLORIDE 50 MG/ML
INJECTION INTRAMUSCULAR; INTRAVENOUS
Status: DISPENSED
Start: 2022-07-06 | End: 2022-07-06

## 2022-07-06 RX ORDER — MIDAZOLAM HYDROCHLORIDE 1 MG/ML
INJECTION INTRAMUSCULAR; INTRAVENOUS
Status: COMPLETED | OUTPATIENT
Start: 2022-07-06 | End: 2022-07-06

## 2022-07-06 RX ORDER — DIPHENHYDRAMINE HYDROCHLORIDE 50 MG/ML
25 INJECTION INTRAMUSCULAR; INTRAVENOUS EVERY 6 HOURS PRN
Status: DISCONTINUED | OUTPATIENT
Start: 2022-07-06 | End: 2022-07-09 | Stop reason: HOSPADM

## 2022-07-06 RX ORDER — LIDOCAINE HYDROCHLORIDE 10 MG/ML
INJECTION, SOLUTION INFILTRATION; PERINEURAL
Status: COMPLETED | OUTPATIENT
Start: 2022-07-06 | End: 2022-07-06

## 2022-07-06 RX ORDER — OLANZAPINE 10 MG/2ML
1 INJECTION, POWDER, LYOPHILIZED, FOR SOLUTION INTRAMUSCULAR
Status: DISCONTINUED | OUTPATIENT
Start: 2022-07-06 | End: 2022-07-09 | Stop reason: HOSPADM

## 2022-07-06 RX ORDER — NICOTINE POLACRILEX 4 MG
15 LOZENGE BUCCAL
Status: DISCONTINUED | OUTPATIENT
Start: 2022-07-06 | End: 2022-07-09 | Stop reason: HOSPADM

## 2022-07-06 RX ORDER — DIPHENHYDRAMINE HYDROCHLORIDE 50 MG/ML
INJECTION INTRAMUSCULAR; INTRAVENOUS
Status: COMPLETED
Start: 2022-07-06 | End: 2022-07-06

## 2022-07-06 RX ORDER — FENTANYL CITRATE 50 UG/ML
INJECTION, SOLUTION INTRAMUSCULAR; INTRAVENOUS
Status: COMPLETED | OUTPATIENT
Start: 2022-07-06 | End: 2022-07-06

## 2022-07-06 RX ORDER — INSULIN LISPRO 100 [IU]/ML
0-12 INJECTION, SOLUTION INTRAVENOUS; SUBCUTANEOUS
Status: DISCONTINUED | OUTPATIENT
Start: 2022-07-07 | End: 2022-07-09 | Stop reason: HOSPADM

## 2022-07-06 RX ORDER — DEXTROSE MONOHYDRATE 25 G/50ML
25 INJECTION, SOLUTION INTRAVENOUS
Status: DISCONTINUED | OUTPATIENT
Start: 2022-07-06 | End: 2022-07-09 | Stop reason: HOSPADM

## 2022-07-06 RX ORDER — INSULIN LISPRO 100 [IU]/ML
0-24 INJECTION, SOLUTION INTRAVENOUS; SUBCUTANEOUS
Status: DISCONTINUED | OUTPATIENT
Start: 2022-07-06 | End: 2022-07-06

## 2022-07-06 RX ORDER — SODIUM CHLORIDE 9 MG/ML
50 INJECTION, SOLUTION INTRAVENOUS CONTINUOUS
Status: DISCONTINUED | OUTPATIENT
Start: 2022-07-06 | End: 2022-07-09

## 2022-07-06 RX ORDER — INSULIN LISPRO 100 [IU]/ML
0-9 INJECTION, SOLUTION INTRAVENOUS; SUBCUTANEOUS AS NEEDED
Status: DISCONTINUED | OUTPATIENT
Start: 2022-07-06 | End: 2022-07-06

## 2022-07-06 RX ORDER — ONDANSETRON 2 MG/ML
INJECTION INTRAMUSCULAR; INTRAVENOUS
Status: COMPLETED | OUTPATIENT
Start: 2022-07-06 | End: 2022-07-06

## 2022-07-06 RX ADMIN — Medication 10 ML: at 09:13

## 2022-07-06 RX ADMIN — GABAPENTIN 300 MG: 300 CAPSULE ORAL at 09:00

## 2022-07-06 RX ADMIN — INSULIN LISPRO 4 UNITS: 100 INJECTION, SOLUTION INTRAVENOUS; SUBCUTANEOUS at 11:56

## 2022-07-06 RX ADMIN — OXYCODONE 10 MG: 5 TABLET ORAL at 15:57

## 2022-07-06 RX ADMIN — BUDESONIDE 1 MG: 0.5 INHALANT RESPIRATORY (INHALATION) at 06:47

## 2022-07-06 RX ADMIN — INSULIN LISPRO 6 UNITS: 100 INJECTION, SOLUTION INTRAVENOUS; SUBCUTANEOUS at 20:15

## 2022-07-06 RX ADMIN — GUAIFENESIN 600 MG: 600 TABLET, EXTENDED RELEASE ORAL at 20:15

## 2022-07-06 RX ADMIN — ALLOPURINOL 100 MG: 100 TABLET ORAL at 16:53

## 2022-07-06 RX ADMIN — CITALOPRAM HYDROBROMIDE 10 MG: 20 TABLET ORAL at 09:00

## 2022-07-06 RX ADMIN — OXYCODONE 10 MG: 5 TABLET ORAL at 20:16

## 2022-07-06 RX ADMIN — GABAPENTIN 300 MG: 300 CAPSULE ORAL at 20:15

## 2022-07-06 RX ADMIN — ARFORMOTEROL TARTRATE 15 MCG: 15 SOLUTION RESPIRATORY (INHALATION) at 21:03

## 2022-07-06 RX ADMIN — ALPRAZOLAM 0.25 MG: 0.25 TABLET ORAL at 20:16

## 2022-07-06 RX ADMIN — SODIUM ZIRCONIUM CYCLOSILICATE 10 G: 10 POWDER, FOR SUSPENSION ORAL at 09:01

## 2022-07-06 RX ADMIN — LIDOCAINE HYDROCHLORIDE 10 ML: 10 INJECTION, SOLUTION INFILTRATION; PERINEURAL at 11:07

## 2022-07-06 RX ADMIN — OXYCODONE 10 MG: 5 TABLET ORAL at 09:06

## 2022-07-06 RX ADMIN — TRAZODONE HYDROCHLORIDE 50 MG: 50 TABLET ORAL at 20:15

## 2022-07-06 RX ADMIN — ARFORMOTEROL TARTRATE 15 MCG: 15 SOLUTION RESPIRATORY (INHALATION) at 06:43

## 2022-07-06 RX ADMIN — FENTANYL CITRATE 50 MCG: 50 INJECTION, SOLUTION INTRAMUSCULAR; INTRAVENOUS at 11:11

## 2022-07-06 RX ADMIN — METOPROLOL SUCCINATE 25 MG: 25 TABLET, FILM COATED, EXTENDED RELEASE ORAL at 09:13

## 2022-07-06 RX ADMIN — DIPHENHYDRAMINE HYDROCHLORIDE 25 MG: 50 INJECTION INTRAMUSCULAR; INTRAVENOUS at 11:57

## 2022-07-06 RX ADMIN — DIPHENHYDRAMINE HYDROCHLORIDE 25 MG: 50 INJECTION, SOLUTION INTRAMUSCULAR; INTRAVENOUS at 11:21

## 2022-07-06 RX ADMIN — FENTANYL CITRATE 50 MCG: 50 INJECTION, SOLUTION INTRAMUSCULAR; INTRAVENOUS at 11:06

## 2022-07-06 RX ADMIN — SODIUM CHLORIDE 100 ML/HR: 9 INJECTION, SOLUTION INTRAVENOUS at 10:12

## 2022-07-06 RX ADMIN — GUAIFENESIN 600 MG: 600 TABLET, EXTENDED RELEASE ORAL at 09:00

## 2022-07-06 RX ADMIN — SODIUM BICARBONATE: 84 INJECTION, SOLUTION INTRAVENOUS at 04:58

## 2022-07-06 RX ADMIN — INSULIN LISPRO 16 UNITS: 100 INJECTION, SOLUTION INTRAVENOUS; SUBCUTANEOUS at 16:53

## 2022-07-06 RX ADMIN — Medication 10 ML: at 20:15

## 2022-07-06 RX ADMIN — SODIUM CHLORIDE 100 ML/HR: 9 INJECTION, SOLUTION INTRAVENOUS at 22:07

## 2022-07-06 RX ADMIN — GABAPENTIN 300 MG: 300 CAPSULE ORAL at 15:57

## 2022-07-06 RX ADMIN — FENTANYL CITRATE 100 MCG: 50 INJECTION, SOLUTION INTRAMUSCULAR; INTRAVENOUS at 11:01

## 2022-07-06 RX ADMIN — INSULIN GLARGINE 18 UNITS: 100 INJECTION, SOLUTION SUBCUTANEOUS at 09:01

## 2022-07-06 RX ADMIN — BUDESONIDE 1 MG: 0.5 INHALANT RESPIRATORY (INHALATION) at 21:03

## 2022-07-06 RX ADMIN — MIDAZOLAM 1 MG: 1 INJECTION INTRAMUSCULAR; INTRAVENOUS at 11:02

## 2022-07-06 RX ADMIN — DIPHENHYDRAMINE HYDROCHLORIDE 25 MG: 50 INJECTION, SOLUTION INTRAMUSCULAR; INTRAVENOUS at 11:57

## 2022-07-06 RX ADMIN — ONDANSETRON 4 MG: 2 INJECTION INTRAMUSCULAR; INTRAVENOUS at 11:27

## 2022-07-06 NOTE — PROGRESS NOTES
Hematology/Oncology Inpatient Progress Note    PATIENT NAME: Nieves Mc  : 1975  MRN: [id number]    CHIEF COMPLAINT: Dyspnea and cough    HISTORY OF PRESENT ILLNESS:      Nieves Mc is a 46 y.o. female who presented to [facility] on 2022 with complaints of worsening shortness of breath, cough,, chest pain for a week.  CT scan shows continued severe infiltrates unchanged from last CT scan.  Patient was also found to be in blast crisis with her history of CML and blasts of 27% on CBC.  Patient was admitted for further evaluation and treatment.     22  Hematology/Oncology was consulted for established patient with CML presenting with 27% blasts on CBC.  Patient is currently on Tasigna twice daily and Hydrea with history of noncompliance with medications and recent office visit with WBC of 200,000 as well as Hgb of 7.7 due to uncontrolled CML.  Patient was continued on Tasigna and Hydrea at office visit.  After presenting yesterday with blast crisis at 27%, her CBC today shows decreased blasts at 17%, and WBC of 205,000.    2022 -.6, hemoglobin 7.6, platelet 222.     2022: No major changes in her symptoms. She had clear evidence of tumor lysis syndrome. In spite of the institution of allopurinol, her uric acid had increased further. She had hyperkalemia and hyperphosphatemia. Rasburicase was prescribed. She was seen by Nephrology.      Subjective   Feels about the same. Has continued to cough, though perhaps not as much. No more dyspnea than before. Able to eat. Awaiting the bone marrow biopsy.     ROS:  Review of Systems   Constitutional: Positive for fatigue. Negative for activity change, appetite change, chills, diaphoresis, fever and unexpected weight change.   HENT: Negative for congestion, dental problem, drooling, ear discharge, ear pain, facial swelling, hearing loss, mouth sores, nosebleeds, postnasal drip, rhinorrhea, sinus pressure, sinus pain,  sneezing, sore throat, tinnitus, trouble swallowing and voice change.    Eyes: Negative for photophobia, pain, discharge, redness, itching and visual disturbance.   Respiratory: Positive for cough. Negative for apnea, choking, chest tightness, shortness of breath, wheezing and stridor.    Cardiovascular: Negative for chest pain, palpitations and leg swelling.   Gastrointestinal: Negative for abdominal distention, abdominal pain, anal bleeding, blood in stool, constipation, diarrhea, nausea, rectal pain and vomiting.   Endocrine: Negative for cold intolerance, heat intolerance, polydipsia, polyphagia and polyuria.   Genitourinary: Negative for decreased urine volume, difficulty urinating, dysuria, flank pain, frequency, genital sores, hematuria and urgency.   Musculoskeletal: Negative for arthralgias, back pain, gait problem, joint swelling, myalgias, neck pain and neck stiffness.   Skin: Negative for color change, pallor and rash.   Neurological: Negative for dizziness, tremors, seizures, syncope, facial asymmetry, speech difficulty, weakness, light-headedness, numbness and headaches.   Hematological: Negative for adenopathy. Does not bruise/bleed easily.   Psychiatric/Behavioral: Negative for agitation, behavioral problems, confusion, decreased concentration, hallucinations, self-injury, sleep disturbance and suicidal ideas. The patient is not nervous/anxious.       MEDICATIONS:    Scheduled Meds:  allopurinol, 100 mg, Oral, Daily  arformoterol, 15 mcg, Nebulization, BID - RT  budesonide, 1 mg, Nebulization, BID - RT  citalopram, 10 mg, Oral, Daily  diphenhydrAMINE, , ,   gabapentin, 300 mg, Oral, TID  guaiFENesin, 600 mg, Oral, Q12H  insulin glargine, 18 Units, Subcutaneous, Daily  insulin lispro, 0-24 Units, Subcutaneous, 4x Daily AC & at Bedtime  metoprolol succinate XL, 25 mg, Oral, Daily  sodium chloride, 10 mL, Intravenous, Q12H  traZODone, 50 mg, Oral, Nightly       Continuous Infusions:  sodium chloride,  "100 mL/hr, Last Rate: 100 mL/hr (07/06/22 1012)       PRN Meds:  •  acetaminophen **OR** acetaminophen **OR** acetaminophen  •  ALPRAZolam  •  aluminum-magnesium hydroxide-simethicone  •  dextrose  •  dextrose  •  dextrose  •  diphenhydrAMINE  •  glucagon (human recombinant)  •  guaiFENesin-codeine  •  insulin lispro **AND** insulin lispro  •  melatonin  •  ondansetron **OR** ondansetron  •  oxyCODONE  •  sodium chloride  •  [COMPLETED] Insert peripheral IV **AND** sodium chloride  •  sodium chloride     ALLERGIES:    Allergies   Allergen Reactions   • Hydromorphone GI Intolerance     dilaudid   • Morphine Nausea And Vomiting   • Propofol Hives     Previously tolerated being premedicated with diphenhydramine and famotadine     Objective    VITALS:   /74   Pulse 106   Temp 97.9 °F (36.6 °C) (Oral)   Resp 10   Ht 162.6 cm (64\")   Wt 57.2 kg (126 lb)   LMP 05/25/2022 (Approximate)   SpO2 100%   BMI 21.63 kg/m²     PHYSICAL EXAM: (performed by MD)  Physical Exam  Constitutional:       General: She is not in acute distress.     Appearance: Normal appearance. She is not ill-appearing, toxic-appearing or diaphoretic.   HENT:      Head: Normocephalic and atraumatic.      Right Ear: External ear normal.      Left Ear: External ear normal.      Nose: Nose normal.      Mouth/Throat:      Mouth: Mucous membranes are moist.      Pharynx: Oropharynx is clear. No oropharyngeal exudate or posterior oropharyngeal erythema.   Eyes:      General: No scleral icterus.        Right eye: No discharge.         Left eye: No discharge.      Conjunctiva/sclera: Conjunctivae normal.      Pupils: Pupils are equal, round, and reactive to light.   Cardiovascular:      Rate and Rhythm: Normal rate and regular rhythm.      Pulses: Normal pulses.      Heart sounds: Normal heart sounds. No murmur heard.    No friction rub. No gallop.   Pulmonary:      Effort: Pulmonary effort is normal. No respiratory distress.      Breath sounds: No " stridor. Rhonchi present. No wheezing or rales.   Abdominal:      General: Abdomen is flat. Bowel sounds are normal. There is distension.      Palpations: Abdomen is soft. There is no mass.      Tenderness: There is no abdominal tenderness. There is no right CVA tenderness, left CVA tenderness, guarding or rebound.      Hernia: No hernia is present.   Musculoskeletal:         General: No swelling, tenderness, deformity or signs of injury. Normal range of motion.      Cervical back: Normal range of motion and neck supple. No rigidity.      Right lower leg: No edema.      Left lower leg: No edema.   Lymphadenopathy:      Cervical: No cervical adenopathy.      Upper Body:      Right upper body: No pectoral adenopathy.   Skin:     General: Skin is warm.      Coloration: Skin is not jaundiced.      Findings: No bruising, lesion or rash.   Neurological:      General: No focal deficit present.      Mental Status: She is alert and oriented to person, place, and time.      Cranial Nerves: No cranial nerve deficit.      Motor: No weakness.      Gait: Gait normal.   Psychiatric:         Mood and Affect: Mood normal.         Behavior: Behavior normal.         Thought Content: Thought content normal.         Judgment: Judgment normal.     JAMEL Truong MD performed the physical exam on 7/6/2022    RECENT LABS:  Lab Results (last 24 hours)     Procedure Component Value Units Date/Time    POC Glucose Once [[id number]]  (Abnormal) Collected: 07/06/22 1643    Specimen: Blood Updated: 07/06/22 1644     Glucose 314 mg/dL      Comment: Serial Number: [id number]:  [id number]       Flow Cytometry [[id number]] Collected: 07/06/22 1113    Specimen: Tissue from Iliac Crest, Left - Biopsy Updated: 07/06/22 1500    Chromosome Analysis, Bone Marrow [[id number]] Collected: 07/06/22 1109    Specimen: Bone Marrow Aspirate from Iliac Crest, Left - Aspirate Updated: 07/06/22 1459    Bone Marrow Aspiration & Exam [[id number]] Collected:  07/06/22 1109    Specimen: Bone Marrow Aspirate from Iliac Crest, Left - Aspirate Updated: 07/06/22 1425    Tissue Pathology Exam [[id number]] Collected: 07/06/22 1109    Specimen: Bone Marrow Aspirate from Iliac Crest, Left - Aspirate; Bone Marrow Aspirate from Iliac Crest, Left - Aspirate; Bone Marrow Aspirate from Iliac Crest, Left - Biopsy Updated: 07/06/22 1423    POC Glucose Once [[id number]]  (Abnormal) Collected: 07/06/22 1148    Specimen: Blood Updated: 07/06/22 1149     Glucose 231 mg/dL      Comment: Serial Number: [id number]:  [id number]       Pathology Consultation [[id number]] Collected: 07/05/22 1105    Specimen: Blood, Venous Line Updated: 07/06/22 1036     Final Diagnosis --     Marked leukocytosis with left shift, peripheral basophilia and circulating blasts  Normocytic anemia         Case Report --     Surgical Pathology Report                         Case: [id number]                                  Authorizing Provider:  Ángela Cervantes MD          Collected:           07/05/2022 11:05 AM          Ordering Location:     [address]    Received:            07/06/2022 07:32 AM                                 MEDICAL INPATIENT                                                            Pathologist:           Max Liz MD                                                             Specimen:    Blood, Venous Line                                                                         Iron Profile [[id number]]  (Normal) Collected: 07/06/22 0408    Specimen: Blood Updated: 07/06/22 0939     Iron 90 mcg/dL      Iron Saturation 25 %      Transferrin 241 mg/dL      TIBC 359 mcg/dL     POC Glucose Once [[id number]]  (Abnormal) Collected: 07/06/22 0721    Specimen: Blood Updated: 07/06/22 0722     Glucose 202 mg/dL      Comment: Serial Number: [id number]:  [id number]       Manual Differential [[id number]]  (Abnormal) Collected: 07/06/22 0408    Specimen: Blood Updated: 07/06/22 0607      Neutrophil % 34.0 %      Lymphocyte % 19.0 %      Monocyte % 2.0 %      Eosinophil % 3.0 %      Basophil % 15.0 %      Bands %  2.0 %      Metamyelocyte % 10.0 %      Myelocyte % 5.0 %      Blasts % 10.0 %      Neutrophils Absolute 15.77 10*3/mm3      Lymphocytes Absolute 8.32 10*3/mm3      Monocytes Absolute 0.88 10*3/mm3      Eosinophils Absolute 1.31 10*3/mm3      Basophils Absolute 6.57 10*3/mm3      nRBC 2.0 /100 WBC      Anisocytosis Slight/1+     Microcytes Slight/1+     Poikilocytes Slight/1+     WBC Morphology Normal     Platelet Morphology Normal    Narrative:      Reviewed by Pathologist within the past 30 days on 070422 .      CBC & Differential [[id number]]  (Abnormal) Collected: 07/06/22 0408    Specimen: Blood Updated: 07/06/22 0607    Narrative:      The following orders were created for panel order CBC & Differential.  Procedure                               Abnormality         Status                     ---------                               -----------         ------                     CBC Auto Differential[[id number]]        Abnormal            Final result               Scan Slide[[id number]]                                       Final result                 Please view results for these tests on the individual orders.    Scan Slide [[id number]] Collected: 07/06/22 0408    Specimen: Blood Updated: 07/06/22 0607     Scan Slide --     Comment: See Manual Differential Results       CBC Auto Differential [[id number]]  (Abnormal) Collected: 07/06/22 0408    Specimen: Blood Updated: 07/06/22 0607     WBC 43.80 10*3/mm3      RBC 2.79 10*6/mm3      Hemoglobin 7.0 g/dL      Hematocrit 22.5 %      MCV 80.7 fL      MCH 24.9 pg      MCHC 30.9 g/dL      RDW 20.8 %      RDW-SD 58.2 fl      MPV 7.5 fL      Platelets 145 10*3/mm3     Narrative:      The previously reported component NRBC is no longer being reported. Previous result was 0.5 /100 WBC (Reference Range: 0.0-0.2 /100 WBC) on 7/6/2022 at 0428 EDT.     Extra Tubes [[id number]] Collected: 07/06/22 0408    Specimen: Blood, Venous Line Updated: 07/06/22 0517    Narrative:      The following orders were created for panel order Extra Tubes.  Procedure                               Abnormality         Status                     ---------                               -----------         ------                     Green Top (Gel)[[id number]]                                  Final result                 Please view results for these tests on the individual orders.    Green Top (Gel) [[id number]] Collected: 07/06/22 0408    Specimen: Blood Updated: 07/06/22 0517     Extra Tube Hold for add-ons.     Comment: Auto resulted.       Comprehensive Metabolic Panel [[id number]]  (Abnormal) Collected: 07/06/22 0408    Specimen: Blood Updated: 07/06/22 0440     Glucose 220 mg/dL      BUN 45 mg/dL      Creatinine 0.73 mg/dL      Sodium 136 mmol/L      Potassium 4.9 mmol/L      Chloride 100 mmol/L      CO2 25.0 mmol/L      Calcium 8.5 mg/dL      Total Protein 6.5 g/dL      Albumin 3.10 g/dL      ALT (SGPT) 6 U/L      AST (SGOT) 7 U/L      Alkaline Phosphatase 527 U/L      Total Bilirubin 0.3 mg/dL      Globulin 3.4 gm/dL      A/G Ratio 0.9 g/dL      BUN/Creatinine Ratio 61.6     Anion Gap 11.0 mmol/L      eGFR 102.9 mL/min/1.73      Comment: National Kidney Foundation and American Society of Nephrology (ASN) Task Force recommended calculation based on the Chronic Kidney Disease Epidemiology Collaboration (CKD-EPI) equation refit without adjustment for race.       Narrative:      GFR Normal >60  Chronic Kidney Disease <60  Kidney Failure <15      Uric Acid [[id number]]  (Abnormal) Collected: 07/06/22 0408    Specimen: Blood Updated: 07/06/22 0440     Uric Acid 6.4 mg/dL     CK [[id number]]  (Abnormal) Collected: 07/06/22 0408    Specimen: Blood Updated: 07/06/22 0440     Creatine Kinase 10 U/L     Phosphorus [[id number]]  (Abnormal) Collected: 07/06/22 0408    Specimen: Blood Updated:  07/06/22 0440     Phosphorus 5.4 mg/dL     Magnesium [[id number]]  (Normal) Collected: 07/06/22 0408    Specimen: Blood Updated: 07/06/22 0440     Magnesium 2.2 mg/dL     Protime-INR [[id number]]  (Normal) Collected: 07/06/22 0408    Specimen: Blood Updated: 07/06/22 0430     Protime 11.2 Seconds      INR 1.09    aPTT [[id number]]  (Normal) Collected: 07/06/22 0408    Specimen: Blood Updated: 07/06/22 0430     PTT 26.9 seconds     Basic Metabolic Panel [[id number]]  (Abnormal) Collected: 07/05/22 1646    Specimen: Blood Updated: 07/05/22 1728     Glucose 205 mg/dL      BUN 60 mg/dL      Creatinine 0.98 mg/dL      Sodium 131 mmol/L      Potassium 6.4 mmol/L      Chloride 96 mmol/L      CO2 25.0 mmol/L      Calcium 8.9 mg/dL      BUN/Creatinine Ratio 61.2     Anion Gap 10.0 mmol/L      eGFR 72.2 mL/min/1.73      Comment: National Kidney Foundation and American Society of Nephrology (ASN) Task Force recommended calculation based on the Chronic Kidney Disease Epidemiology Collaboration (CKD-EPI) equation refit without adjustment for race.       Narrative:      GFR Normal >60  Chronic Kidney Disease <60  Kidney Failure <15            IMAGING REVIEWED:  XR Chest 1 View    Result Date: 7/6/2022  Cardiomegaly with no evidence of pulmonary edema. Atelectasis in the lung bases due to low lung volumes  Electronically Signed By-Dom Corral On:7/6/2022 8:09 AM This report was finalized on [id number] by  Dom Corral, .    CT Bone marrow biopsy and aspiration    Result Date: 7/6/2022  Technically successful CT-guided left posterior iliac 11-gauge bone marrow aspiration and core biopsy.  Electronically Signed By-Mary Corado MD On:7/6/2022 3:27 PM This report was finalized on [id number] by  Mary Corado MD.      Assessment & Plan   ASSESSMENT:  1. Tumor lysis syndrome. Improved. Will continue with allopurinol and follow closely.    2. Chronic myelogenous leukemia. The hydroxyurea is on hold for now  3. Lung disease.        PLAN:  1. As above.       Yifan Truong MD on 7/6/2022 at 17:09

## 2022-07-06 NOTE — PROGRESS NOTES
"Nutrition Services    Patient Name: Nieves Mc  YOB: 1975  MRN: [id number]  Admission date: 6/29/2022      PPE Documentation        PPE Worn By Provider Did not enter room for this encounter   PPE Worn By Patient  N/A     PROGRESS NOTE      Encounter Information: Check on for PO intakes.  Patient transferred to ICU.  Noted with 97% average PO intakes since last RD review - patient noted orders small meals, often getting only soup for lunch and dinner.  Patient discussed in AM rounds.         Labs (reviewed below): Elevated BUN  Elevated Alk Phos  Hyperglycemia - to add sliding scale per discussion in rounds.   Hyperphosphatemia       GI Function:  Last BM 7/5 (yesterday)       Nutrition Intervention: Continue current diet and encourage good PO intakes.       PO Diet/Supplements: Diet Diabetic/Consistent Carbs; Diabetic - Consistent Carb; Fluid Restriction; 1800cc/day   Magic Cup with lunch   EN Prescription:        Intake/Output:   Intake/Output Summary (Last 24 hours) at 7/6/2022 0706  Last data filed at 7/6/2022 0600  Gross per 24 hour   Intake 2500 ml   Output 900 ml   Net 1600 ml            Height: Height: 162.6 cm (64\")   Weight: Weight: 57.2 kg (126 lb) (07/05/22 0504)   BMI: Body mass index is 21.63 kg/m².     Results from last 7 days   Lab Units 07/06/22  0408 07/05/22  1646 07/05/22  1105 07/05/22  0908   SODIUM mmol/L 136 131* 130* 132*   POTASSIUM mmol/L 4.9 6.4* 7.1* 5.3*   CHLORIDE mmol/L 100 96* 95* 96*   CO2 mmol/L 25.0 25.0 23.0 20.0*   BUN mg/dL 45* 60* 61* 59*   CREATININE mg/dL 0.73 0.98 0.94 0.99   CALCIUM mg/dL 8.5* 8.9 8.6 8.9   BILIRUBIN mg/dL 0.3  --  0.3 0.4   ALK PHOS U/L 527*  --  672* 720*   ALT (SGPT) U/L 6  --  9 9   AST (SGOT) U/L 7  --  11 14   GLUCOSE mg/dL 220* 205* 184* 193*     Results from last 7 days   Lab Units 07/06/22  0408   MAGNESIUM mg/dL 2.2   PHOSPHORUS mg/dL 5.4*   HEMOGLOBIN g/dL 7.0*   HEMATOCRIT % 22.5*     COVID19   Date Value Ref Range Status "   06/29/2022 Not Detected Not Detected - Ref. Range Final     Lab Results   Component Value Date    HGBA1C 8.4 (H) 06/29/2022       Vitals:    07/06/22 0650   BP:    Pulse: 93   Resp: 16   Temp:    SpO2: 99%       RD to follow up per protocol.    Electronically signed by:  Chayo Robert RD  07/06/22 07:06 EDT

## 2022-07-06 NOTE — NURSING NOTE
1102 MD scans and valentin area with CT imaging.  1105 Posterior iliac prepped with chlorhexidine and sterile drape when dry.  1108 MD places trocar and advances with hammer.  Marrow sample aspirated.  1113 MD advances trocar with hammer and obtains core sample, needle removed.  1115 Site dressed with bacitracin, maxorb and tegaderm.  1118 Patient reports itching on arms and face.  No respiratory involvement or hives noted.  1125 Patient reports nausea.  1129 Patient reports improvement of itching and nausea.

## 2022-07-06 NOTE — PROGRESS NOTES
Daily Progress Note    Patient Care Team:  Houston Oro MD as PCP - General (Family Medicine)  Meghann Lerma MD as Consulting Physician (Hematology and Oncology)    Chief Complaint: Follow-up type 2 diabetes    HPI: Patient seen and examined today.  Apparently moved to ICU for tumor lysis syndrome.  Patient is eating however her Humalog prandial dose has been stopped.  She is currently on Lantus and Humalog sliding scale.  Sugars are high.  Patient tells me that she underwent bone marrow biopsy today and after that she had an ice cream.    ROS:   Constitutional:  Denies fatigue, tiredness.    Respiratory: denies cough, shortness of breath.   Cardiovascular:  denies chest pain, edema   GI:  Denies abdominal pain, nausea, vomiting.         Vitals:    07/06/22 1600   BP:    Pulse:    Resp:    Temp: 97.9 °F (36.6 °C)   SpO2:      Body mass index is 21.63 kg/m².    Physical Exam:  GEN: NAD, conversant  CV: RRR  LUNG: CTA  PSYCH: AOX3, appropriate mood, affect normal      Results Review:     I reviewed the patient's new clinical results.    Glucose   Date Value Ref Range Status   07/06/2022 220 (H) 65 - 99 mg/dL Final     Sodium   Date Value Ref Range Status   07/06/2022 136 136 - 145 mmol/L Final     Potassium   Date Value Ref Range Status   07/06/2022 4.9 3.5 - 5.2 mmol/L Final     CO2   Date Value Ref Range Status   07/06/2022 25.0 22.0 - 29.0 mmol/L Final     Chloride   Date Value Ref Range Status   07/06/2022 100 98 - 107 mmol/L Final     Anion Gap   Date Value Ref Range Status   07/06/2022 11.0 5.0 - 15.0 mmol/L Final     Creatinine   Date Value Ref Range Status   07/06/2022 0.73 0.57 - 1.00 mg/dL Final     BUN   Date Value Ref Range Status   07/06/2022 45 (H) 6 - 20 mg/dL Final     BUN/Creatinine Ratio   Date Value Ref Range Status   07/06/2022 61.6 (H) 7.0 - 25.0 Final     Calcium   Date Value Ref Range Status   07/06/2022 8.5 (L) 8.6 - 10.5 mg/dL Final     Alkaline Phosphatase   Date  Value Ref Range Status   07/06/2022 527 (H) 39 - 117 U/L Final     Total Protein   Date Value Ref Range Status   07/06/2022 6.5 6.0 - 8.5 g/dL Final     ALT (SGPT)   Date Value Ref Range Status   07/06/2022 6 1 - 33 U/L Final     AST (SGOT)   Date Value Ref Range Status   07/06/2022 7 1 - 32 U/L Final     Total Bilirubin   Date Value Ref Range Status   07/06/2022 0.3 0.0 - 1.2 mg/dL Final     Albumin   Date Value Ref Range Status   07/06/2022 3.10 (L) 3.50 - 5.20 g/dL Final     Globulin   Date Value Ref Range Status   07/06/2022 3.4 gm/dL Final     Magnesium   Date Value Ref Range Status   07/06/2022 2.2 1.6 - 2.6 mg/dL Final     Phosphorus   Date Value Ref Range Status   07/06/2022 5.4 (H) 2.5 - 4.5 mg/dL Final     Lab Results   Component Value Date    HGBA1C 8.4 (H) 06/29/2022    HGBA1C 10.2 (H) 06/02/2022    HGBA1C 10.8 (H) 01/13/2022       Results from last 7 days   Lab Units 07/06/22  1643 07/06/22  1148 07/06/22  0721 07/05/22  1630 07/05/22  1208 07/05/22  0911   GLUCOSE mg/dL 314* 231* 202* 186* 181* 188*             Medication Review: Reviewed.     allopurinol, 100 mg, Oral, Daily  arformoterol, 15 mcg, Nebulization, BID - RT  budesonide, 1 mg, Nebulization, BID - RT  citalopram, 10 mg, Oral, Daily  diphenhydrAMINE, , ,   gabapentin, 300 mg, Oral, TID  guaiFENesin, 600 mg, Oral, Q12H  insulin glargine, 18 Units, Subcutaneous, Daily  insulin lispro, 0-24 Units, Subcutaneous, 4x Daily AC & at Bedtime  metoprolol succinate XL, 25 mg, Oral, Daily  sodium chloride, 10 mL, Intravenous, Q12H  traZODone, 50 mg, Oral, Nightly              Assessment and plan:  Diabetes mellitus type 2: Uncontrolled with hyperglycemia, will resume Humalog 6 units with each meal and continue glargine 18 units subcu daily along with Humalog sliding scale.  We will follow blood sugars and make further adjustments as needed.        Cardiomyopathy: Followed by cardiology     Chronic myelogenous leukemia: Followed by oncology     PE:  Currently on Xarelto and followed by primary team as well as pulmonary and critical care medicine.    Tumor lysis syndrome: Followed by oncology.    Dalton Garcia MD. FACE

## 2022-07-06 NOTE — PROGRESS NOTES
"                                                                                                                                      Nephrology  Progress Note                                        [facility]    Patient Identification    Name: Nieves Mc  Age: 46 y.o.  Sex: female  :  1975  MRN: [id number]      DATE OF SERVICE:  2022        Subective    Patient's without complaint  No shortness of breath       Objective   Scheduled Meds:arformoterol, 15 mcg, Nebulization, BID - RT  budesonide, 1 mg, Nebulization, BID - RT  citalopram, 10 mg, Oral, Daily  gabapentin, 300 mg, Oral, TID  guaiFENesin, 600 mg, Oral, Q12H  insulin glargine, 18 Units, Subcutaneous, Daily  metoprolol succinate XL, 25 mg, Oral, Daily  sodium chloride, 10 mL, Intravenous, Q12H  sodium zirconium cyclosilicate, 10 g, Oral, TID  traZODone, 50 mg, Oral, Nightly          Continuous Infusions:custom IV infusion builder, , Last Rate: 150 mL/hr at 22 0458        PRN Meds:•  acetaminophen **OR** acetaminophen **OR** acetaminophen  •  ALPRAZolam  •  aluminum-magnesium hydroxide-simethicone  •  dextrose  •  dextrose  •  dextrose  •  glucagon (human recombinant)  •  glucagon (human recombinant)  •  guaiFENesin-codeine  •  melatonin  •  ondansetron **OR** ondansetron  •  oxyCODONE  •  sodium chloride  •  [COMPLETED] Insert peripheral IV **AND** sodium chloride  •  sodium chloride     Exam:  /72   Pulse 90   Temp 97.4 °F (36.3 °C) (Oral)   Resp 16   Ht 162.6 cm (64\")   Wt 57.2 kg (126 lb)   LMP 2022 (Approximate)   SpO2 100%   BMI 21.63 kg/m²     Intake/Output last 3 shifts:  I/O last 3 completed shifts:  In: 3220 [P.O.:1200; I.V.:1659; Blood:361]  Out: 900 [Urine:900]    Intake/Output this shift:  No intake/output data recorded.    Physical exam:  General Appearance:  Alert  Head:  Normocephalic, without obvious abnormality, atraumatic  Eyes:  PERRL, conjunctiva/corneas clear     Neck:  Supple, "  no adenopathy;      Lungs:  Decreased BS occasion ronchi  Heart:  Regular rate and rhythm, S1 and S2 normal  Abdomen:  Soft, non-tender, bowel sounds active   Extremities: trace edema  Pulses: 2+ and symmetric all extremities  Skin:  No rashes or lesions       Data Review:  All labs (24hrs):   Recent Results (from the past 24 hour(s))   Phosphorus    Collection Time: 07/05/22  9:08 AM    Specimen: Blood   Result Value Ref Range    Phosphorus 7.1 (H) 2.5 - 4.5 mg/dL   BNP    Collection Time: 07/05/22  9:08 AM    Specimen: Blood   Result Value Ref Range    proBNP 858.8 (H) 0.0 - 450.0 pg/mL   Comprehensive Metabolic Panel    Collection Time: 07/05/22  9:08 AM    Specimen: Blood   Result Value Ref Range    Glucose 193 (H) 65 - 99 mg/dL    BUN 59 (H) 6 - 20 mg/dL    Creatinine 0.99 0.57 - 1.00 mg/dL    Sodium 132 (L) 136 - 145 mmol/L    Potassium 5.3 (H) 3.5 - 5.2 mmol/L    Chloride 96 (L) 98 - 107 mmol/L    CO2 20.0 (L) 22.0 - 29.0 mmol/L    Calcium 8.9 8.6 - 10.5 mg/dL    Total Protein 7.9 6.0 - 8.5 g/dL    Albumin 3.90 3.50 - 5.20 g/dL    ALT (SGPT) 9 1 - 33 U/L    AST (SGOT) 14 1 - 32 U/L    Alkaline Phosphatase 720 (H) 39 - 117 U/L    Total Bilirubin 0.4 0.0 - 1.2 mg/dL    Globulin 4.0 gm/dL    A/G Ratio 1.0 g/dL    BUN/Creatinine Ratio 59.6 (H) 7.0 - 25.0    Anion Gap 16.0 (H) 5.0 - 15.0 mmol/L    eGFR 71.4 >60.0 mL/min/1.73   POC Glucose Once    Collection Time: 07/05/22  9:11 AM    Specimen: Blood   Result Value Ref Range    Glucose 188 (H) 70 - 105 mg/dL   Comprehensive Metabolic Panel    Collection Time: 07/05/22 11:05 AM    Specimen: Blood   Result Value Ref Range    Glucose 184 (H) 65 - 99 mg/dL    BUN 61 (H) 6 - 20 mg/dL    Creatinine 0.94 0.57 - 1.00 mg/dL    Sodium 130 (L) 136 - 145 mmol/L    Potassium 7.1 (C) 3.5 - 5.2 mmol/L    Chloride 95 (L) 98 - 107 mmol/L    CO2 23.0 22.0 - 29.0 mmol/L    Calcium 8.6 8.6 - 10.5 mg/dL    Total Protein 7.1 6.0 - 8.5 g/dL    Albumin 3.60 3.50 - 5.20 g/dL    ALT (SGPT)  9 1 - 33 U/L    AST (SGOT) 11 1 - 32 U/L    Alkaline Phosphatase 672 (H) 39 - 117 U/L    Total Bilirubin 0.3 0.0 - 1.2 mg/dL    Globulin 3.5 gm/dL    A/G Ratio 1.0 g/dL    BUN/Creatinine Ratio 64.9 (H) 7.0 - 25.0    Anion Gap 12.0 5.0 - 15.0 mmol/L    eGFR 75.9 >60.0 mL/min/1.73   CBC Auto Differential    Collection Time: 07/05/22 11:05 AM    Specimen: Blood   Result Value Ref Range    WBC 70.90 (C) 3.40 - 10.80 10*3/mm3    RBC 2.72 (L) 3.77 - 5.28 10*6/mm3    Hemoglobin 6.7 (C) 12.0 - 15.9 g/dL    Hematocrit 22.3 (L) 34.0 - 46.6 %    MCV 81.8 79.0 - 97.0 fL    MCH 24.8 (L) 26.6 - 33.0 pg    MCHC 30.3 (L) 31.5 - 35.7 g/dL    RDW 22.2 (H) 12.3 - 15.4 %    RDW-SD 62.1 (H) 37.0 - 54.0 fl    MPV 7.7 6.0 - 12.0 fL    Platelets 169 140 - 450 10*3/mm3   Scan Slide    Collection Time: 07/05/22 11:05 AM    Specimen: Blood   Result Value Ref Range    Scan Slide     Uric Acid    Collection Time: 07/05/22 11:05 AM    Specimen: Blood   Result Value Ref Range    Uric Acid 13.0 (H) 2.4 - 5.7 mg/dL   Manual Differential    Collection Time: 07/05/22 11:05 AM    Specimen: Blood   Result Value Ref Range    Neutrophil % 50.0 42.7 - 76.0 %    Lymphocyte % 9.0 (L) 19.6 - 45.3 %    Monocyte % 3.0 (L) 5.0 - 12.0 %    Eosinophil % 4.0 0.3 - 6.2 %    Basophil % 16.0 (H) 0.0 - 1.5 %    Bands %  6.0 (H) 0.0 - 5.0 %    Metamyelocyte % 2.0 (H) 0.0 - 0.0 %    Myelocyte % 1.0 (H) 0.0 - 0.0 %    Promyelocyte % 3.0 (H) 0.0 - 0.0 %    Blasts % 6.0 (H) 0.0 - 0.0 %    Neutrophils Absolute 39.70 (H) 1.70 - 7.00 10*3/mm3    Lymphocytes Absolute 6.38 (H) 0.70 - 3.10 10*3/mm3    Monocytes Absolute 2.13 (H) 0.10 - 0.90 10*3/mm3    Eosinophils Absolute 2.84 (H) 0.00 - 0.40 10*3/mm3    Basophils Absolute 11.34 (H) 0.00 - 0.20 10*3/mm3    Anisocytosis Slight/1+ None Seen    WBC Morphology Normal Normal    Platelet Morphology Normal Normal   Path Consult Reflex    Collection Time: 07/05/22 11:05 AM    Specimen: Blood   Result Value Ref Range    Pathology Review  Yes    POC Glucose Once    Collection Time: 07/05/22 12:08 PM    Specimen: Blood   Result Value Ref Range    Glucose 181 (H) 70 - 105 mg/dL   Type & Screen    Collection Time: 07/05/22  1:07 PM    Specimen: Arm, Right; Blood   Result Value Ref Range    ABO Type A     RH type Positive     Antibody Screen Positive     T&S Expiration Date 7/8/2022 11:59:59 PM    Antibody Identification    Collection Time: 07/05/22  1:07 PM    Specimen: Arm, Right; Blood   Result Value Ref Range    Anti-E ANTI-E     Anti-S ANTI-S    Sodium, Urine, Random - Urine, Clean Catch    Collection Time: 07/05/22  4:20 PM    Specimen: Urine, Clean Catch   Result Value Ref Range    Sodium, Urine 86 mmol/L   Urinalysis With Microscopic If Indicated (No Culture) - Urine, Clean Catch    Collection Time: 07/05/22  4:20 PM    Specimen: Urine, Clean Catch   Result Value Ref Range    Color, UA Yellow Yellow, Straw    Appearance, UA Clear Clear    pH, UA <=5.0 5.0 - 8.0    Specific Gravity, UA 1.009 1.005 - 1.030    Glucose, UA Negative Negative    Ketones, UA Negative Negative    Bilirubin, UA Negative Negative    Blood, UA Large (3+) (A) Negative    Protein, UA Negative Negative    Leuk Esterase, UA Trace (A) Negative    Nitrite, UA Negative Negative    Urobilinogen, UA 0.2 E.U./dL 0.2 - 1.0 E.U./dL   Urinalysis, Microscopic Only - Urine, Clean Catch    Collection Time: 07/05/22  4:20 PM    Specimen: Urine, Clean Catch   Result Value Ref Range    RBC, UA Too Numerous to Count (A) None Seen /HPF    WBC, UA 0-2 (A) None Seen /HPF    Bacteria, UA None Seen None Seen /HPF    Squamous Epithelial Cells, UA 0-2 None Seen, 0-2 /HPF    Hyaline Casts, UA 0-2 None Seen /LPF    Methodology Automated Microscopy    POC Glucose Once    Collection Time: 07/05/22  4:30 PM    Specimen: Blood   Result Value Ref Range    Glucose 186 (H) 70 - 105 mg/dL   Basic Metabolic Panel    Collection Time: 07/05/22  4:46 PM    Specimen: Blood   Result Value Ref Range    Glucose 205  (H) 65 - 99 mg/dL    BUN 60 (H) 6 - 20 mg/dL    Creatinine 0.98 0.57 - 1.00 mg/dL    Sodium 131 (L) 136 - 145 mmol/L    Potassium 6.4 (C) 3.5 - 5.2 mmol/L    Chloride 96 (L) 98 - 107 mmol/L    CO2 25.0 22.0 - 29.0 mmol/L    Calcium 8.9 8.6 - 10.5 mg/dL    BUN/Creatinine Ratio 61.2 (H) 7.0 - 25.0    Anion Gap 10.0 5.0 - 15.0 mmol/L    eGFR 72.2 >60.0 mL/min/1.73   ECG 12 Lead    Collection Time: 07/05/22  4:54 PM   Result Value Ref Range    QT Interval 348 ms   Prepare RBC, 1 Units    Collection Time: 07/05/22  9:56 PM   Result Value Ref Range    Product Code S8412H59     Unit Number [id number]     UNIT  ABO O     UNIT  RH POS     Crossmatch Interpretation Compatible     Dispense Status XM     Blood Expiration Date 443849606427     Blood Type Barcode 5100    Prepare RBC, 1 Units    Collection Time: 07/05/22 10:42 PM   Result Value Ref Range    Product Code B4441E41     Unit Number [id number]     UNIT  ABO A     UNIT  RH POS     Crossmatch Interpretation Compatible     Dispense Status IS     Blood Expiration Date 202208082359     Blood Type Barcode 6200    Protime-INR    Collection Time: 07/06/22  4:08 AM    Specimen: Blood   Result Value Ref Range    Protime 11.2 9.6 - 11.7 Seconds    INR 1.09 0.93 - 1.10   aPTT    Collection Time: 07/06/22  4:08 AM    Specimen: Blood   Result Value Ref Range    PTT 26.9 24.0 - 31.0 seconds   Comprehensive Metabolic Panel    Collection Time: 07/06/22  4:08 AM    Specimen: Blood   Result Value Ref Range    Glucose 220 (H) 65 - 99 mg/dL    BUN 45 (H) 6 - 20 mg/dL    Creatinine 0.73 0.57 - 1.00 mg/dL    Sodium 136 136 - 145 mmol/L    Potassium 4.9 3.5 - 5.2 mmol/L    Chloride 100 98 - 107 mmol/L    CO2 25.0 22.0 - 29.0 mmol/L    Calcium 8.5 (L) 8.6 - 10.5 mg/dL    Total Protein 6.5 6.0 - 8.5 g/dL    Albumin 3.10 (L) 3.50 - 5.20 g/dL    ALT (SGPT) 6 1 - 33 U/L    AST (SGOT) 7 1 - 32 U/L    Alkaline Phosphatase 527 (H) 39 - 117 U/L    Total Bilirubin 0.3 0.0 - 1.2 mg/dL     Globulin 3.4 gm/dL    A/G Ratio 0.9 g/dL    BUN/Creatinine Ratio 61.6 (H) 7.0 - 25.0    Anion Gap 11.0 5.0 - 15.0 mmol/L    eGFR 102.9 >60.0 mL/min/1.73   CK    Collection Time: 07/06/22  4:08 AM    Specimen: Blood   Result Value Ref Range    Creatine Kinase 10 (L) 20 - 180 U/L   CBC Auto Differential    Collection Time: 07/06/22  4:08 AM    Specimen: Blood   Result Value Ref Range    WBC 43.80 (C) 3.40 - 10.80 10*3/mm3    RBC 2.79 (L) 3.77 - 5.28 10*6/mm3    Hemoglobin 7.0 (L) 12.0 - 15.9 g/dL    Hematocrit 22.5 (L) 34.0 - 46.6 %    MCV 80.7 79.0 - 97.0 fL    MCH 24.9 (L) 26.6 - 33.0 pg    MCHC 30.9 (L) 31.5 - 35.7 g/dL    RDW 20.8 (H) 12.3 - 15.4 %    RDW-SD 58.2 (H) 37.0 - 54.0 fl    MPV 7.5 6.0 - 12.0 fL    Platelets 145 140 - 450 10*3/mm3   Magnesium    Collection Time: 07/06/22  4:08 AM    Specimen: Blood   Result Value Ref Range    Magnesium 2.2 1.6 - 2.6 mg/dL   Phosphorus    Collection Time: 07/06/22  4:08 AM    Specimen: Blood   Result Value Ref Range    Phosphorus 5.4 (H) 2.5 - 4.5 mg/dL   Uric Acid    Collection Time: 07/06/22  4:08 AM    Specimen: Blood   Result Value Ref Range    Uric Acid 6.4 (H) 2.4 - 5.7 mg/dL   Green Top (Gel)    Collection Time: 07/06/22  4:08 AM   Result Value Ref Range    Extra Tube Hold for add-ons.    Scan Slide    Collection Time: 07/06/22  4:08 AM    Specimen: Blood   Result Value Ref Range    Scan Slide     Manual Differential    Collection Time: 07/06/22  4:08 AM    Specimen: Blood   Result Value Ref Range    Neutrophil % 34.0 (L) 42.7 - 76.0 %    Lymphocyte % 19.0 (L) 19.6 - 45.3 %    Monocyte % 2.0 (L) 5.0 - 12.0 %    Eosinophil % 3.0 0.3 - 6.2 %    Basophil % 15.0 (H) 0.0 - 1.5 %    Bands %  2.0 0.0 - 5.0 %    Metamyelocyte % 10.0 (H) 0.0 - 0.0 %    Myelocyte % 5.0 (H) 0.0 - 0.0 %    Blasts % 10.0 (H) 0.0 - 0.0 %    Neutrophils Absolute 15.77 (H) 1.70 - 7.00 10*3/mm3    Lymphocytes Absolute 8.32 (H) 0.70 - 3.10 10*3/mm3    Monocytes Absolute 0.88 0.10 - 0.90 10*3/mm3     Eosinophils Absolute 1.31 (H) 0.00 - 0.40 10*3/mm3    Basophils Absolute 6.57 (H) 0.00 - 0.20 10*3/mm3    nRBC 2.0 (H) 0.0 - 0.2 /100 WBC    Anisocytosis Slight/1+ None Seen    Microcytes Slight/1+ None Seen    Poikilocytes Slight/1+ None Seen    WBC Morphology Normal Normal    Platelet Morphology Normal Normal   POC Glucose Once    Collection Time: 07/06/22  7:21 AM    Specimen: Blood   Result Value Ref Range    Glucose 202 (H) 70 - 105 mg/dL          Imaging:  CT Head Without Contrast    Result Date: 6/29/2022  CT head is normal  Electronically Signed By-Torito Oneal MD On:6/29/2022 6:43 PM This report was finalized on [id number] by  Torito Oneal MD.    US Non-ob Transvaginal    Result Date: 7/4/2022  1.Uterus and left adnexa are unremarkable. 2.2.3 cm right adnexal cyst, likely physiologic.  Electronically Signed By-Amador Larry MD On:7/4/2022 4:43 PM This report was finalized on [id number] by  Amador Larry MD.    US Pelvis Complete    Result Date: 7/4/2022  1.Uterus and left adnexa are unremarkable. 2.2.3 cm right adnexal cyst, likely physiologic.  Electronically Signed By-Amador Larry MD On:7/4/2022 4:43 PM This report was finalized on [id number] by  Amador Larry MD.    XR Chest 1 View    Result Date: 7/3/2022  1.Hazy bilateral airspace opacities, which could reflect pulmonary edema or pneumonia. 2.Cardiomegaly.  Electronically Signed By-Amador Larry MD On:7/3/2022 6:09 PM This report was finalized on [id number] by  Amador Larry MD.    XR Chest 1 View    Result Date: 6/29/2022  IMPRESSION : Cardiomegaly and moderate congestive failure, pulmonary edema. Asymmetric opacity at the right lung base may represent asymmetric edema, atelectasis or pneumonia[  Electronically Signed By-Freddy Toribio On:6/29/2022 4:02 PM This report was finalized on [id number] by  Freddy Toribio, .    CT Angiogram Chest Pulmonary Embolism    Result Date: 6/29/2022   1. No  evidence of thrombus or embolus in the right or left pulmonary artery branches. 2. Bilateral diffuse symmetric severe infiltrates unchanged significantly since the previous study.  Electronically Signed By-Torito Oneal MD On:6/29/2022 7:07 PM This report was finalized on [id number] by  Torito Oneal MD.    XR Hip With or Without Pelvis 1 View Left    Result Date: 6/30/2022  Unremarkable left hip and pelvis  Electronically Signed By-Dom Corral On:6/30/2022 10:18 AM This report was finalized on [id number] by  Dom Corral, .      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

## 2022-07-06 NOTE — PLAN OF CARE
Goal Outcome Evaluation:   Pt remains on ICU with possible transfer out of ICU tomorrow. Bone marrow aspiration this am. VSS. Oxycodone 10mg PRN per request. Hgb increased to 7 post transfusion overnight.

## 2022-07-07 ENCOUNTER — APPOINTMENT (OUTPATIENT)
Dept: GENERAL RADIOLOGY | Facility: HOSPITAL | Age: 47
End: 2022-07-07

## 2022-07-07 PROBLEM — D50.9 NORMOCYTIC HYPOCHROMIC ANEMIA: Status: ACTIVE | Noted: 2021-03-11

## 2022-07-07 PROBLEM — D50.9 NORMOCYTIC HYPOCHROMIC ANEMIA: Chronic | Status: ACTIVE | Noted: 2021-03-11

## 2022-07-07 PROBLEM — J18.9 ATYPICAL PNEUMONIA: Status: RESOLVED | Noted: 2022-06-01 | Resolved: 2022-07-07

## 2022-07-07 PROBLEM — U07.1 COVID-19 VIRUS INFECTION: Status: RESOLVED | Noted: 2022-01-12 | Resolved: 2022-07-07

## 2022-07-07 PROBLEM — I42.8 NICM (NONISCHEMIC CARDIOMYOPATHY) (HCC): Status: ACTIVE | Noted: 2022-06-29

## 2022-07-07 PROBLEM — E78.5 DYSLIPIDEMIA: Status: RESOLVED | Noted: 2021-09-28 | Resolved: 2022-06-02

## 2022-07-07 PROBLEM — E83.39 HYPERPHOSPHATEMIA: Status: ACTIVE | Noted: 2022-07-05

## 2022-07-07 PROBLEM — J96.01 ACUTE RESPIRATORY FAILURE WITH HYPOXIA: Status: ACTIVE | Noted: 2022-06-29

## 2022-07-07 PROBLEM — D50.9 NORMOCYTIC HYPOCHROMIC ANEMIA: Status: ACTIVE | Noted: 2022-06-29

## 2022-07-07 PROBLEM — R00.0 TACHYCARDIA: Status: ACTIVE | Noted: 2022-06-02

## 2022-07-07 PROBLEM — E87.5 HYPERKALEMIA: Status: RESOLVED | Noted: 2022-07-05 | Resolved: 2022-07-07

## 2022-07-07 PROBLEM — E79.0 HYPERURICEMIA: Status: ACTIVE | Noted: 2022-07-05

## 2022-07-07 PROBLEM — E11.42 TYPE 2 DIABETES MELLITUS WITH DIABETIC POLYNEUROPATHY, WITH LONG-TERM CURRENT USE OF INSULIN: Chronic | Status: ACTIVE | Noted: 2021-09-28

## 2022-07-07 PROBLEM — D69.6 THROMBOCYTOPENIA: Status: ACTIVE | Noted: 2022-07-07

## 2022-07-07 PROBLEM — E88.3 TUMOR LYSIS SYNDROME: Status: ACTIVE | Noted: 2022-07-05

## 2022-07-07 PROBLEM — I50.31 ACUTE DIASTOLIC CHF (CONGESTIVE HEART FAILURE) (HCC): Status: ACTIVE | Noted: 2022-06-29

## 2022-07-07 PROBLEM — E87.5 HYPERKALEMIA: Status: ACTIVE | Noted: 2022-07-05

## 2022-07-07 PROBLEM — E83.39 HYPERPHOSPHATEMIA: Status: RESOLVED | Noted: 2022-07-05 | Resolved: 2022-07-07

## 2022-07-07 PROBLEM — Z79.4 TYPE 2 DIABETES MELLITUS WITH DIABETIC POLYNEUROPATHY, WITH LONG-TERM CURRENT USE OF INSULIN: Chronic | Status: ACTIVE | Noted: 2021-09-28

## 2022-07-07 LAB
ALBUMIN SERPL-MCNC: 3.2 G/DL (ref 3.5–5.2)
ALBUMIN/GLOB SERPL: 1 G/DL
ALP SERPL-CCNC: 476 U/L (ref 39–117)
ALT SERPL W P-5'-P-CCNC: 8 U/L (ref 1–33)
ANION GAP SERPL CALCULATED.3IONS-SCNC: 8 MMOL/L (ref 5–15)
ANISOCYTOSIS BLD QL: ABNORMAL
AST SERPL-CCNC: 13 U/L (ref 1–32)
BASO STIPL COARSE BLD QL SMEAR: ABNORMAL
BASOPHILS # BLD MANUAL: 7.78 10*3/MM3 (ref 0–0.2)
BASOPHILS NFR BLD MANUAL: 18 % (ref 0–1.5)
BH BB BLOOD EXPIRATION DATE: NORMAL
BH BB BLOOD TYPE BARCODE: 6200
BH BB DISPENSE STATUS: NORMAL
BH BB PRODUCT CODE: NORMAL
BH BB UNIT NUMBER: NORMAL
BILIRUB SERPL-MCNC: 0.4 MG/DL (ref 0–1.2)
BLASTS NFR BLD MANUAL: 3 % (ref 0–0)
BUN SERPL-MCNC: 22 MG/DL (ref 6–20)
BUN/CREAT SERPL: 39.3 (ref 7–25)
CALCIUM SPEC-SCNC: 8.2 MG/DL (ref 8.6–10.5)
CHLORIDE SERPL-SCNC: 104 MMOL/L (ref 98–107)
CO2 SERPL-SCNC: 25 MMOL/L (ref 22–29)
CREAT SERPL-MCNC: 0.56 MG/DL (ref 0.57–1)
CROSSMATCH INTERPRETATION: NORMAL
DEPRECATED RDW RBC AUTO: 62.1 FL (ref 37–54)
DIMORPHIC RBC: PRESENT
EGFRCR SERPLBLD CKD-EPI 2021: 114.1 ML/MIN/1.73
EOSINOPHIL # BLD MANUAL: 1.3 10*3/MM3 (ref 0–0.4)
EOSINOPHIL NFR BLD MANUAL: 3 % (ref 0.3–6.2)
ERYTHROCYTE [DISTWIDTH] IN BLOOD BY AUTOMATED COUNT: 21.8 % (ref 12.3–15.4)
GLOBULIN UR ELPH-MCNC: 3.2 GM/DL
GLUCOSE BLDC GLUCOMTR-MCNC: 134 MG/DL (ref 70–105)
GLUCOSE BLDC GLUCOMTR-MCNC: 137 MG/DL (ref 70–105)
GLUCOSE BLDC GLUCOMTR-MCNC: 179 MG/DL (ref 70–105)
GLUCOSE BLDC GLUCOMTR-MCNC: 220 MG/DL (ref 70–105)
GLUCOSE SERPL-MCNC: 312 MG/DL (ref 65–99)
HCT VFR BLD AUTO: 20.8 % (ref 34–46.6)
HCT VFR BLD AUTO: 22.5 % (ref 34–46.6)
HCT VFR BLD AUTO: 24.3 % (ref 34–46.6)
HGB BLD-MCNC: 6.4 G/DL (ref 12–15.9)
HGB BLD-MCNC: 6.9 G/DL (ref 12–15.9)
HGB BLD-MCNC: 7.8 G/DL (ref 12–15.9)
LYMPHOCYTES # BLD MANUAL: 2.16 10*3/MM3 (ref 0.7–3.1)
LYMPHOCYTES NFR BLD MANUAL: 3 % (ref 5–12)
MAGNESIUM SERPL-MCNC: 1.9 MG/DL (ref 1.6–2.6)
MCH RBC QN AUTO: 25.3 PG (ref 26.6–33)
MCHC RBC AUTO-ENTMCNC: 30.8 G/DL (ref 31.5–35.7)
MCV RBC AUTO: 82.2 FL (ref 79–97)
METAMYELOCYTES NFR BLD MANUAL: 15 % (ref 0–0)
MONOCYTES # BLD: 1.3 10*3/MM3 (ref 0.1–0.9)
MYELOCYTES NFR BLD MANUAL: 7 % (ref 0–0)
NEUTROPHILS # BLD AUTO: 19.44 10*3/MM3 (ref 1.7–7)
NEUTROPHILS NFR BLD MANUAL: 41 % (ref 42.7–76)
NEUTS BAND NFR BLD MANUAL: 4 % (ref 0–5)
NRBC SPEC MANUAL: 3 /100 WBC (ref 0–0.2)
PHOSPHATE SERPL-MCNC: 3.6 MG/DL (ref 2.5–4.5)
PLATELET # BLD AUTO: 125 10*3/MM3 (ref 140–450)
PMV BLD AUTO: 7.5 FL (ref 6–12)
POIKILOCYTOSIS BLD QL SMEAR: ABNORMAL
POTASSIUM SERPL-SCNC: 5.2 MMOL/L (ref 3.5–5.2)
PROMYELOCYTES NFR BLD MANUAL: 1 % (ref 0–0)
PROT SERPL-MCNC: 6.4 G/DL (ref 6–8.5)
RBC # BLD AUTO: 2.53 10*6/MM3 (ref 3.77–5.28)
SCAN SLIDE: NORMAL
SMALL PLATELETS BLD QL SMEAR: ABNORMAL
SODIUM SERPL-SCNC: 137 MMOL/L (ref 136–145)
UNIT  ABO: NORMAL
UNIT  RH: NORMAL
URATE SERPL-MCNC: 4.2 MG/DL (ref 2.4–5.7)
VARIANT LYMPHS NFR BLD MANUAL: 5 % (ref 19.6–45.3)
WBC MORPH BLD: NORMAL
WBC NRBC COR # BLD: 43.2 10*3/MM3 (ref 3.4–10.8)

## 2022-07-07 PROCEDURE — 99232 SBSQ HOSP IP/OBS MODERATE 35: CPT | Performed by: INTERNAL MEDICINE

## 2022-07-07 PROCEDURE — 63710000001 INSULIN LISPRO (HUMAN) PER 5 UNITS: Performed by: NURSE PRACTITIONER

## 2022-07-07 PROCEDURE — 71045 X-RAY EXAM CHEST 1 VIEW: CPT

## 2022-07-07 PROCEDURE — 36430 TRANSFUSION BLD/BLD COMPNT: CPT

## 2022-07-07 PROCEDURE — 25010000002 CALCIUM GLUCONATE-NACL 1-0.675 GM/50ML-% SOLUTION: Performed by: NURSE PRACTITIONER

## 2022-07-07 PROCEDURE — 84550 ASSAY OF BLOOD/URIC ACID: CPT | Performed by: INTERNAL MEDICINE

## 2022-07-07 PROCEDURE — 86902 BLOOD TYPE ANTIGEN DONOR EA: CPT

## 2022-07-07 PROCEDURE — 85014 HEMATOCRIT: CPT | Performed by: NURSE PRACTITIONER

## 2022-07-07 PROCEDURE — 82962 GLUCOSE BLOOD TEST: CPT

## 2022-07-07 PROCEDURE — 85014 HEMATOCRIT: CPT

## 2022-07-07 PROCEDURE — 63710000001 INSULIN GLARGINE PER 5 UNITS: Performed by: INTERNAL MEDICINE

## 2022-07-07 PROCEDURE — 94761 N-INVAS EAR/PLS OXIMETRY MLT: CPT

## 2022-07-07 PROCEDURE — 25010000002 DIPHENHYDRAMINE PER 50 MG

## 2022-07-07 PROCEDURE — 85018 HEMOGLOBIN: CPT | Performed by: NURSE PRACTITIONER

## 2022-07-07 PROCEDURE — 83735 ASSAY OF MAGNESIUM: CPT | Performed by: NURSE PRACTITIONER

## 2022-07-07 PROCEDURE — 94799 UNLISTED PULMONARY SVC/PX: CPT

## 2022-07-07 PROCEDURE — 63710000001 INSULIN LISPRO (HUMAN) PER 5 UNITS: Performed by: INTERNAL MEDICINE

## 2022-07-07 PROCEDURE — 85007 BL SMEAR W/DIFF WBC COUNT: CPT | Performed by: INTERNAL MEDICINE

## 2022-07-07 PROCEDURE — 86900 BLOOD TYPING SEROLOGIC ABO: CPT

## 2022-07-07 PROCEDURE — P9016 RBC LEUKOCYTES REDUCED: HCPCS

## 2022-07-07 PROCEDURE — 85018 HEMOGLOBIN: CPT

## 2022-07-07 PROCEDURE — 80053 COMPREHEN METABOLIC PANEL: CPT | Performed by: INTERNAL MEDICINE

## 2022-07-07 PROCEDURE — 85025 COMPLETE CBC W/AUTO DIFF WBC: CPT | Performed by: INTERNAL MEDICINE

## 2022-07-07 PROCEDURE — 99232 SBSQ HOSP IP/OBS MODERATE 35: CPT | Performed by: NURSE PRACTITIONER

## 2022-07-07 PROCEDURE — 84100 ASSAY OF PHOSPHORUS: CPT | Performed by: NURSE PRACTITIONER

## 2022-07-07 RX ORDER — CALCIUM GLUCONATE 20 MG/ML
1 INJECTION, SOLUTION INTRAVENOUS ONCE
Status: COMPLETED | OUTPATIENT
Start: 2022-07-07 | End: 2022-07-07

## 2022-07-07 RX ORDER — DIPHENHYDRAMINE HYDROCHLORIDE 50 MG/ML
12.5 INJECTION INTRAMUSCULAR; INTRAVENOUS ONCE
Status: COMPLETED | OUTPATIENT
Start: 2022-07-07 | End: 2022-07-07

## 2022-07-07 RX ADMIN — Medication 10 ML: at 21:07

## 2022-07-07 RX ADMIN — GABAPENTIN 300 MG: 300 CAPSULE ORAL at 15:05

## 2022-07-07 RX ADMIN — ALPRAZOLAM 0.25 MG: 0.25 TABLET ORAL at 21:07

## 2022-07-07 RX ADMIN — GABAPENTIN 300 MG: 300 CAPSULE ORAL at 21:07

## 2022-07-07 RX ADMIN — DIPHENHYDRAMINE HYDROCHLORIDE 12.5 MG: 50 INJECTION, SOLUTION INTRAMUSCULAR; INTRAVENOUS at 08:08

## 2022-07-07 RX ADMIN — OXYCODONE 10 MG: 5 TABLET ORAL at 03:07

## 2022-07-07 RX ADMIN — CALCIUM GLUCONATE 1 G: 20 INJECTION, SOLUTION INTRAVENOUS at 10:00

## 2022-07-07 RX ADMIN — OXYCODONE 10 MG: 5 TABLET ORAL at 15:05

## 2022-07-07 RX ADMIN — INSULIN LISPRO 6 UNITS: 100 INJECTION, SOLUTION INTRAVENOUS; SUBCUTANEOUS at 17:54

## 2022-07-07 RX ADMIN — CITALOPRAM HYDROBROMIDE 10 MG: 20 TABLET ORAL at 08:09

## 2022-07-07 RX ADMIN — INSULIN GLARGINE 18 UNITS: 100 INJECTION, SOLUTION SUBCUTANEOUS at 08:09

## 2022-07-07 RX ADMIN — BUDESONIDE 1 MG: 0.5 INHALANT RESPIRATORY (INHALATION) at 20:47

## 2022-07-07 RX ADMIN — ALLOPURINOL 100 MG: 100 TABLET ORAL at 08:09

## 2022-07-07 RX ADMIN — INSULIN LISPRO 6 UNITS: 100 INJECTION, SOLUTION INTRAVENOUS; SUBCUTANEOUS at 12:18

## 2022-07-07 RX ADMIN — INSULIN LISPRO 4 UNITS: 100 INJECTION, SOLUTION INTRAVENOUS; SUBCUTANEOUS at 08:10

## 2022-07-07 RX ADMIN — GUAIFENESIN 600 MG: 600 TABLET, EXTENDED RELEASE ORAL at 21:07

## 2022-07-07 RX ADMIN — METOPROLOL SUCCINATE 25 MG: 25 TABLET, FILM COATED, EXTENDED RELEASE ORAL at 08:09

## 2022-07-07 RX ADMIN — BUDESONIDE 1 MG: 0.5 INHALANT RESPIRATORY (INHALATION) at 06:49

## 2022-07-07 RX ADMIN — ARFORMOTEROL TARTRATE 15 MCG: 15 SOLUTION RESPIRATORY (INHALATION) at 06:49

## 2022-07-07 RX ADMIN — OXYCODONE 10 MG: 5 TABLET ORAL at 21:07

## 2022-07-07 RX ADMIN — OXYCODONE 10 MG: 5 TABLET ORAL at 08:09

## 2022-07-07 RX ADMIN — INSULIN LISPRO 6 UNITS: 100 INJECTION, SOLUTION INTRAVENOUS; SUBCUTANEOUS at 08:09

## 2022-07-07 RX ADMIN — Medication 10 ML: at 08:14

## 2022-07-07 RX ADMIN — GUAIFENESIN 600 MG: 600 TABLET, EXTENDED RELEASE ORAL at 08:09

## 2022-07-07 RX ADMIN — SODIUM ZIRCONIUM CYCLOSILICATE 10 G: 10 POWDER, FOR SUSPENSION ORAL at 12:18

## 2022-07-07 RX ADMIN — TRAZODONE HYDROCHLORIDE 50 MG: 50 TABLET ORAL at 21:07

## 2022-07-07 RX ADMIN — ARFORMOTEROL TARTRATE 15 MCG: 15 SOLUTION RESPIRATORY (INHALATION) at 20:47

## 2022-07-07 RX ADMIN — GABAPENTIN 300 MG: 300 CAPSULE ORAL at 08:09

## 2022-07-07 NOTE — PLAN OF CARE
Goal Outcome Evaluation:    Patient on 3 L nasal cannula. Normal saline gtt running. Patient alert and oriented. Patient transferred to PCU room 2122.

## 2022-07-07 NOTE — PROGRESS NOTES
[facility] Services Daily Progress Note    Patient Name: Nieves Mc  : 1975  MRN: [id number]  Primary Care Physician:  Houston Oro MD  Date of admission: 2022      Subjective      Chief Complaint: shortness of breath, chest pain      Patient denies any current shortness of breath or chest pain.     Review of Systems   Cardiovascular: Negative for chest pain.   Respiratory: Negative for shortness of breath.    All other systems reviewed and are negative.         Objective      Vitals:   Temp:  [97.4 °F (36.3 °C)-98.8 °F (37.1 °C)] 97.9 °F (36.6 °C)  Heart Rate:  [] 101  Resp:  [10-22] 16  BP: (102-143)/() 121/72  Flow (L/min):  [2-3.5] 2    Physical Exam  Vitals reviewed.   HENT:      Head: Normocephalic.   Eyes:      Extraocular Movements: Extraocular movements intact.      Conjunctiva/sclera: Conjunctivae normal.      Pupils: Pupils are equal, round, and reactive to light.   Cardiovascular:      Rate and Rhythm: Normal rate and regular rhythm.      Pulses: Normal pulses.      Comments: Trace edema  Pulmonary:      Effort: Pulmonary effort is normal.      Breath sounds: Rhonchi present.      Comments: O2 @ 2 L per NC  Abdominal:      General: Bowel sounds are normal.      Palpations: Abdomen is soft.      Tenderness: There is no abdominal tenderness.   Musculoskeletal:         General: Normal range of motion.      Cervical back: Normal range of motion.   Skin:     General: Skin is warm and dry.      Capillary Refill: Capillary refill takes less than 2 seconds.   Neurological:      Mental Status: She is alert and oriented to person, place, and time.   Psychiatric:         Mood and Affect: Mood normal.         Behavior: Behavior normal.           Result Review    Result Review:  I have personally reviewed the results from the time of this admission to 2022 10:14 EDT and agree with these findings:  [x]  Laboratory  [x]  Microbiology  []   Radiology  []  EKG/Telemetry   []  Cardiology/Vascular   []  Pathology  [x]  Old records  []  Other:    Most notable findings include:   WBC 43.20, hgb 6.4, Plt 125, BUN 22, Cr 0.56, glucose 312, alk phos 476        Assessment & Plan      Brief Patient Summary:  Nieves Mc is a 46 y.o. female with past medical history of CML, chronic pain syndrome, depression with anxiety, history of pulmonary embolism on chronic anticoagulation, and Type 2 diabetes mellitus who presented to the ER at [facility] on 6/29/2022 complaining of shortness of breath and chest pain. Of note, the patient was recently hospitalized from 6/01-6/10/2022 for management of acute hypoxic respiratory failure in the setting of TRALI and CML. Workup in the ER revealed blast crisis and acute systolic heart failure. Cardiology and oncology were consulted. The patient was admitted to the Hospitalist group for further treatment.     On 07/05/22 the patient was noted to have tumor lysis syndrome, severe anemia, hyperkalemia, and hyperphosphatemia. Nephrology was consulted. She was transferred to ICU for close monitoring.     On 07/07/22 the patient was downgraded to [facility]/PCU and the Hospitalist group was re-consulted for medical management.         allopurinol, 100 mg, Oral, Daily  arformoterol, 15 mcg, Nebulization, BID - RT  budesonide, 1 mg, Nebulization, BID - RT  citalopram, 10 mg, Oral, Daily  gabapentin, 300 mg, Oral, TID  guaiFENesin, 600 mg, Oral, Q12H  insulin glargine, 18 Units, Subcutaneous, Daily  insulin lispro, 0-12 Units, Subcutaneous, TID With Meals  insulin lispro, 6 Units, Subcutaneous, TID With Meals  metoprolol succinate XL, 25 mg, Oral, Daily  sodium chloride, 10 mL, Intravenous, Q12H  traZODone, 50 mg, Oral, Nightly       sodium chloride, 100 mL/hr, Last Rate: 100 mL/hr (07/06/22 2207)         Active Hospital Problems:  Active Hospital Problems    Diagnosis    • **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)    •  Thrombocytopenia (HCC)    • Tumor lysis syndrome    • Hyperuricemia    • Menorrhagia    • Acute diastolic CHF (congestive heart failure) (HCC)    • Acute respiratory failure with hypoxia (HCC)    • Normocytic hypochromic anemia    • NICM (nonischemic cardiomyopathy) (HCC)    • Moderate malnutrition (HCC)    • Type 2 diabetes mellitus with diabetic polyneuropathy, with long-term current use of insulin (HCC)    • Depression with anxiety    • History of pulmonary embolism    • CML (chronic myelocytic leukemia) ([facility])      Plan:     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   Tumor lysis syndrome with hyperuricemia   CML (chronic myelocytic leukemia)   -oncology following  -received Rasburicase   -WBC trending down  -on allopurinol  -hydroxyurea on hold   -monitor and trend CBC and uric acid level    Acute diastolic CHF (congestive heart failure) secondary to nonischemic cardiomyopathy  -EF 41-45% per 2D echo  -cardiology following  -monitor I&Os and daily weight   -2 g Na diet, 1800 cc/day fluid restriction   -diuresis on hold  -on BB    Acute respiratory failure with hypoxia secondary to fluid overload  -baseline:  Room air  -currently on O2 @ 2 L per NC  -titrate O2 to keep sat >90%  -on bronchodilators  -pulmonology following     Normocytic hypochromic anemia  -oncology following   -transfused with 2 units PRBC total since admission  -monitor H&H    Thrombocytopenia   -oncology following     Menorrhagia  -GYN consulted  -US unremarkable  -GYN recommended progesterone and outpatient follow up    Depression with anxiety  -on Celexa, trazodone, and PRN Xanax     History of pulmonary embolism  -Xarelto on hold d/t severe anemia and menorrhagia   -SCDs for VTE prophylaxis     Type 2 diabetes mellitus with diabetic polyneuropathy, with long-term current use of insulin   -A1c 8.4%   -accu checks AC&HS with SSI  -on Lantus and gabapentin   -diabetic consistent carb diet  -endocrinology following     Moderate  malnutrition  -dietitian following              DVT prophylaxis:  Mechanical DVT prophylaxis orders are present.    CODE STATUS:    Code Status (Patient has no pulse and is not breathing): CPR (Attempt to Resuscitate)  Medical Interventions (Patient has pulse or is breathing): Full Support      Disposition:  Defer, pending clinical course.    This patient has been examined wearing appropriate Personal Protective Equipment. 07/07/22      Electronically signed by JP Champagne, 07/07/22, 10:51 EDT.  [facility], [place], operated by [facility] Hospitalist Team

## 2022-07-07 NOTE — PLAN OF CARE
Goal Outcome Evaluation:         Patient's vitals stable. Hbg lower again this morning and will receive 1 unit PRBC. Reports pain improved after oxycodone doses but not completely alleviated. Patient compliant with fluid restriction.

## 2022-07-07 NOTE — PROGRESS NOTES
"                                                                                                                                      Nephrology  Progress Note                                        [facility]    Patient Identification    Name: Nieves Mc  Age: 46 y.o.  Sex: female  :  1975  MRN: [id number]      DATE OF SERVICE:  2022        Subective    Patient's without complaint  No shortness of breath       Objective   Scheduled Meds:allopurinol, 100 mg, Oral, Daily  arformoterol, 15 mcg, Nebulization, BID - RT  budesonide, 1 mg, Nebulization, BID - RT  citalopram, 10 mg, Oral, Daily  diphenhydrAMINE, 12.5 mg, Intravenous, Once  gabapentin, 300 mg, Oral, TID  guaiFENesin, 600 mg, Oral, Q12H  insulin glargine, 18 Units, Subcutaneous, Daily  insulin lispro, 0-12 Units, Subcutaneous, TID With Meals  insulin lispro, 6 Units, Subcutaneous, TID With Meals  metoprolol succinate XL, 25 mg, Oral, Daily  sodium chloride, 10 mL, Intravenous, Q12H  traZODone, 50 mg, Oral, Nightly          Continuous Infusions:sodium chloride, 100 mL/hr, Last Rate: 100 mL/hr (22)        PRN Meds:•  acetaminophen **OR** acetaminophen **OR** acetaminophen  •  ALPRAZolam  •  aluminum-magnesium hydroxide-simethicone  •  dextrose  •  dextrose  •  dextrose  •  diphenhydrAMINE  •  glucagon (human recombinant)  •  guaiFENesin-codeine  •  melatonin  •  ondansetron **OR** ondansetron  •  oxyCODONE  •  sodium chloride  •  [COMPLETED] Insert peripheral IV **AND** sodium chloride  •  sodium chloride     Exam:  /67   Pulse 96   Temp 97.4 °F (36.3 °C) (Oral)   Resp 16   Ht 162.6 cm (64\")   Wt 59.2 kg (130 lb 8.2 oz)   LMP 2022 (Approximate)   SpO2 100%   BMI 22.40 kg/m²     Intake/Output last 3 shifts:  I/O last 3 completed shifts:  In: 2500 [P.O.:480; I.V.:1659; Blood:361]  Out: 900 [Urine:900]    Intake/Output this shift:  I/O this shift:  In: 2232 [P.O.:440; I.V.:1792]  Out: - "     Physical exam:  General Appearance:  Alert  Head:  Normocephalic, without obvious abnormality, atraumatic  Eyes:  PERRL, conjunctiva/corneas clear     Neck:  Supple,  no adenopathy;      Lungs:  Decreased BS occasion ronchi  Heart:  Regular rate and rhythm, S1 and S2 normal  Abdomen:  Soft, non-tender, bowel sounds active   Extremities: trace edema  Pulses: 2+ and symmetric all extremities  Skin:  No rashes or lesions       Data Review:  All labs (24hrs):   Recent Results (from the past 24 hour(s))   POC Glucose Once    Collection Time: 07/06/22  7:21 AM    Specimen: Blood   Result Value Ref Range    Glucose 202 (H) 70 - 105 mg/dL   POC Glucose Once    Collection Time: 07/06/22 11:48 AM    Specimen: Blood   Result Value Ref Range    Glucose 231 (H) 70 - 105 mg/dL   POC Glucose Once    Collection Time: 07/06/22  4:43 PM    Specimen: Blood   Result Value Ref Range    Glucose 314 (H) 70 - 105 mg/dL   POC Glucose Once    Collection Time: 07/06/22  8:10 PM    Specimen: Blood   Result Value Ref Range    Glucose 306 (H) 70 - 105 mg/dL   POC Glucose Once    Collection Time: 07/06/22  9:44 PM    Specimen: Blood   Result Value Ref Range    Glucose 211 (H) 70 - 105 mg/dL   Prepare RBC, 1 Units    Collection Time: 07/07/22  2:00 AM   Result Value Ref Range    Product Code Y4502K92     Unit Number [id number]     UNIT  ABO A     UNIT  RH POS     Crossmatch Interpretation Compatible     Dispense Status PT     Blood Expiration Date [id number]     Blood Type Barcode 6200    Comprehensive Metabolic Panel    Collection Time: 07/07/22  5:44 AM    Specimen: Blood   Result Value Ref Range    Glucose 312 (H) 65 - 99 mg/dL    BUN 22 (H) 6 - 20 mg/dL    Creatinine 0.56 (L) 0.57 - 1.00 mg/dL    Sodium 137 136 - 145 mmol/L    Potassium 5.2 3.5 - 5.2 mmol/L    Chloride 104 98 - 107 mmol/L    CO2 25.0 22.0 - 29.0 mmol/L    Calcium 8.2 (L) 8.6 - 10.5 mg/dL    Total Protein 6.4 6.0 - 8.5 g/dL    Albumin 3.20 (L) 3.50 - 5.20 g/dL    ALT  (SGPT) 8 1 - 33 U/L    AST (SGOT) 13 1 - 32 U/L    Alkaline Phosphatase 476 (H) 39 - 117 U/L    Total Bilirubin 0.4 0.0 - 1.2 mg/dL    Globulin 3.2 gm/dL    A/G Ratio 1.0 g/dL    BUN/Creatinine Ratio 39.3 (H) 7.0 - 25.0    Anion Gap 8.0 5.0 - 15.0 mmol/L    eGFR 114.1 >60.0 mL/min/1.73   Uric Acid    Collection Time: 07/07/22  5:44 AM    Specimen: Blood   Result Value Ref Range    Uric Acid 4.2 2.4 - 5.7 mg/dL   Magnesium    Collection Time: 07/07/22  5:44 AM    Specimen: Blood   Result Value Ref Range    Magnesium 1.9 1.6 - 2.6 mg/dL   Phosphorus    Collection Time: 07/07/22  5:44 AM    Specimen: Blood   Result Value Ref Range    Phosphorus 3.6 2.5 - 4.5 mg/dL   CBC Auto Differential    Collection Time: 07/07/22  5:44 AM    Specimen: Blood   Result Value Ref Range    WBC 43.20 (C) 3.40 - 10.80 10*3/mm3    RBC 2.53 (L) 3.77 - 5.28 10*6/mm3    Hemoglobin 6.4 (C) 12.0 - 15.9 g/dL    Hematocrit 20.8 (C) 34.0 - 46.6 %    MCV 82.2 79.0 - 97.0 fL    MCH 25.3 (L) 26.6 - 33.0 pg    MCHC 30.8 (L) 31.5 - 35.7 g/dL    RDW 21.8 (H) 12.3 - 15.4 %    RDW-SD 62.1 (H) 37.0 - 54.0 fl    MPV 7.5 6.0 - 12.0 fL    Platelets 125 (L) 140 - 450 10*3/mm3   Scan Slide    Collection Time: 07/07/22  5:44 AM    Specimen: Blood   Result Value Ref Range    Scan Slide     Manual Differential    Collection Time: 07/07/22  5:44 AM    Specimen: Blood   Result Value Ref Range    Neutrophil % 41.0 (L) 42.7 - 76.0 %    Lymphocyte % 5.0 (L) 19.6 - 45.3 %    Monocyte % 3.0 (L) 5.0 - 12.0 %    Eosinophil % 3.0 0.3 - 6.2 %    Basophil % 18.0 (H) 0.0 - 1.5 %    Bands %  4.0 0.0 - 5.0 %    Metamyelocyte % 15.0 (H) 0.0 - 0.0 %    Myelocyte % 7.0 (H) 0.0 - 0.0 %    Promyelocyte % 1.0 (H) 0.0 - 0.0 %    Blasts % 3.0 (H) 0.0 - 0.0 %    Neutrophils Absolute 19.44 (H) 1.70 - 7.00 10*3/mm3    Lymphocytes Absolute 2.16 0.70 - 3.10 10*3/mm3    Monocytes Absolute 1.30 (H) 0.10 - 0.90 10*3/mm3    Eosinophils Absolute 1.30 (H) 0.00 - 0.40 10*3/mm3    Basophils Absolute  7.78 (H) 0.00 - 0.20 10*3/mm3    nRBC 3.0 (H) 0.0 - 0.2 /100 WBC    Anisocytosis Slight/1+ None Seen    Basophilic Stippling Slight/1+ None Seen    Dimorphic RBC Present None Seen    Poikilocytes Slight/1+ None Seen    WBC Morphology Normal Normal    Platelet Estimate Decreased Normal          Imaging:  CT Head Without Contrast    Result Date: 6/29/2022  CT head is normal  Electronically Signed By-Torito Oneal MD On:6/29/2022 6:43 PM This report was finalized on [id number] by  Torito Oneal MD.    US Non-ob Transvaginal    Result Date: 7/4/2022  1.Uterus and left adnexa are unremarkable. 2.2.3 cm right adnexal cyst, likely physiologic.  Electronically Signed By-Amador Larry MD On:7/4/2022 4:43 PM This report was finalized on [id number] by  Amador Larry MD.    US Pelvis Complete    Result Date: 7/4/2022  1.Uterus and left adnexa are unremarkable. 2.2.3 cm right adnexal cyst, likely physiologic.  Electronically Signed By-Amador Larry MD On:7/4/2022 4:43 PM This report was finalized on [id number] by  Amador Larry MD.    XR Chest 1 View    Result Date: 7/6/2022  Cardiomegaly with no evidence of pulmonary edema. Atelectasis in the lung bases due to low lung volumes  Electronically Signed By-Dom Corral On:7/6/2022 8:09 AM This report was finalized on [id number] by  Dom Corral, .    XR Chest 1 View    Result Date: 7/3/2022  1.Hazy bilateral airspace opacities, which could reflect pulmonary edema or pneumonia. 2.Cardiomegaly.  Electronically Signed By-Amador Larry MD On:7/3/2022 6:09 PM This report was finalized on [id number] by  Amador Larry MD.    XR Chest 1 View    Result Date: 6/29/2022  IMPRESSION : Cardiomegaly and moderate congestive failure, pulmonary edema. Asymmetric opacity at the right lung base may represent asymmetric edema, atelectasis or pneumonia[  Electronically Signed By-Freddy Toribio On:6/29/2022 4:02 PM This report was finalized on  [id number] by  Freddy Toribio, .    CT Angiogram Chest Pulmonary Embolism    Result Date: 6/29/2022   1. No evidence of thrombus or embolus in the right or left pulmonary artery branches. 2. Bilateral diffuse symmetric severe infiltrates unchanged significantly since the previous study.  Electronically Signed By-Torito Oneal MD On:6/29/2022 7:07 PM This report was finalized on [id number] by  Torito Oneal MD.    CT Bone marrow biopsy and aspiration    Result Date: 7/6/2022  Technically successful CT-guided left posterior iliac 11-gauge bone marrow aspiration and core biopsy.  Electronically Signed By-Mary Corado MD On:7/6/2022 3:27 PM This report was finalized on [id number] by  Mary Corado MD.    XR Hip With or Without Pelvis 1 View Left    Result Date: 6/30/2022  Unremarkable left hip and pelvis  Electronically Signed By-Dom Corral On:6/30/2022 10:18 AM This report was finalized on [id number] by  Dom Corral, .      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

## 2022-07-07 NOTE — PROGRESS NOTES
Daily Progress Note    Patient Care Team:  Houston Oro MD as PCP - General (Family Medicine)  Meghann Lerma MD as Consulting Physician (Hematology and Oncology)    Chief Complaint: Follow-up type 2 diabetes    HPI: Patient seen and examined today.  Blood sugar log reviewed.  Blood sugars still high.  She is eating well.  No complaints at this time.    ROS:   Constitutional:  Denies fatigue, tiredness.    Respiratory: denies cough, shortness of breath.   Cardiovascular:  denies chest pain, edema   GI:  Denies abdominal pain, nausea, vomiting.         Vitals:    07/07/22 1500   BP: 120/74   Pulse: 101   Resp: 20   Temp: 98.8 °F (37.1 °C)   SpO2: 99%     Body mass index is 21.97 kg/m².    Physical Exam:  GEN: NAD, conversant   CV: RRR  LUNG: CTA  PSYCH: AOX3, appropriate mood, affect normal      Results Review:     I reviewed the patient's new clinical results.    Glucose   Date Value Ref Range Status   07/07/2022 312 (H) 65 - 99 mg/dL Final     Sodium   Date Value Ref Range Status   07/07/2022 137 136 - 145 mmol/L Final     Potassium   Date Value Ref Range Status   07/07/2022 5.2 3.5 - 5.2 mmol/L Final     CO2   Date Value Ref Range Status   07/07/2022 25.0 22.0 - 29.0 mmol/L Final     Chloride   Date Value Ref Range Status   07/07/2022 104 98 - 107 mmol/L Final     Anion Gap   Date Value Ref Range Status   07/07/2022 8.0 5.0 - 15.0 mmol/L Final     Creatinine   Date Value Ref Range Status   07/07/2022 0.56 (L) 0.57 - 1.00 mg/dL Final     BUN   Date Value Ref Range Status   07/07/2022 22 (H) 6 - 20 mg/dL Final     BUN/Creatinine Ratio   Date Value Ref Range Status   07/07/2022 39.3 (H) 7.0 - 25.0 Final     Calcium   Date Value Ref Range Status   07/07/2022 8.2 (L) 8.6 - 10.5 mg/dL Final     Alkaline Phosphatase   Date Value Ref Range Status   07/07/2022 476 (H) 39 - 117 U/L Final     Total Protein   Date Value Ref Range Status   07/07/2022 6.4 6.0 - 8.5 g/dL Final     ALT (SGPT)   Date  Value Ref Range Status   07/07/2022 8 1 - 33 U/L Final     AST (SGOT)   Date Value Ref Range Status   07/07/2022 13 1 - 32 U/L Final     Total Bilirubin   Date Value Ref Range Status   07/07/2022 0.4 0.0 - 1.2 mg/dL Final     Albumin   Date Value Ref Range Status   07/07/2022 3.20 (L) 3.50 - 5.20 g/dL Final     Globulin   Date Value Ref Range Status   07/07/2022 3.2 gm/dL Final     Magnesium   Date Value Ref Range Status   07/07/2022 1.9 1.6 - 2.6 mg/dL Final     Phosphorus   Date Value Ref Range Status   07/07/2022 3.6 2.5 - 4.5 mg/dL Final     Lab Results   Component Value Date    HGBA1C 8.4 (H) 06/29/2022    HGBA1C 10.2 (H) 06/02/2022    HGBA1C 10.8 (H) 01/13/2022     No results found for: GLUF, MICROALBUR  Results from last 7 days   Lab Units 07/07/22  1134 07/07/22  0729 07/06/22  2144 07/06/22 2010 07/06/22  1643 07/06/22  1148   GLUCOSE mg/dL 137* 220* 211* 306* 314* 231*             Medication Review: Reviewed.     allopurinol, 100 mg, Oral, Daily  arformoterol, 15 mcg, Nebulization, BID - RT  budesonide, 1 mg, Nebulization, BID - RT  citalopram, 10 mg, Oral, Daily  gabapentin, 300 mg, Oral, TID  guaiFENesin, 600 mg, Oral, Q12H  insulin glargine, 18 Units, Subcutaneous, Daily  insulin lispro, 0-12 Units, Subcutaneous, TID With Meals  insulin lispro, 6 Units, Subcutaneous, TID With Meals  metoprolol succinate XL, 25 mg, Oral, Daily  sodium chloride, 10 mL, Intravenous, Q12H  traZODone, 50 mg, Oral, Nightly              Assessment and plan:  Diabetes mellitus type 2: Uncontrolled with hyperglycemia,  will increase Lantus to 20 units subcu daily and continue Humalog 6 units with each meal and continue glargine 18 units subcu daily along with Humalog sliding scale.  We will follow blood sugars and make further adjustments as needed.        Cardiomyopathy: Followed by cardiology     Chronic myelogenous leukemia: Followed by oncology     PE: Currently on Xarelto and followed by primary team as well as pulmonary  and critical care medicine.     Tumor lysis syndrome: Followed by oncology.    Dalton Garcia MD. FACE

## 2022-07-07 NOTE — PROGRESS NOTES
"PULMONARY CRITICAL CARE PROGRESS  NOTE      PATIENT IDENTIFICATION:  Name: Nieves Mc  MRN: [id number]  :  1975     Age: 46 y.o.  Sex: female    DATE OF Note:  2022   Referring Physician: Shanelle Rodgers MD                  Subjective:   S/p bone marrow biopsy   still vaginal bleed    on 2 L  no SOB no chest pain, no nausea or vomiting, no change in bowel habit, no dysuria,  no new  skin rash or itching.     Objective:  tMax 24 hrs: Temp (24hrs), Av °F (36.7 °C), Min:97.4 °F (36.3 °C), Max:98.8 °F (37.1 °C)      Vitals Ranges:   Temp:  [97.4 °F (36.3 °C)-98.8 °F (37.1 °C)] 97.9 °F (36.6 °C)  Heart Rate:  [] 96  Resp:  [10-22] 16  BP: (102-143)/() 130/84    Intake and Output Last 3 Shifts:   I/O last 3 completed shifts:  In: 4992 [P.O.:1180; I.V.:3451; Blood:361]  Out: -     Exam:  /84   Pulse 96   Temp 97.9 °F (36.6 °C) (Oral)   Resp 16   Ht 162.6 cm (64\")   Wt 59.2 kg (130 lb 8.2 oz)   LMP 2022 (Approximate)   SpO2 100%   BMI 22.40 kg/m²     General Appearance: Alert awake looks fatigue pale  HEENT:  Normocephalic, without obvious abnormality. Conjunctivae/corneas clear.  Normal external ear canals. Nares normal, no drainage     Neck:  Supple, symmetrical, trachea midline. No JVD.  Lungs /Chest wall:   Bilateral basal rhonchi, respirations unlabored, symmetrical wall movement.     Heart:  Regular rate and rhythm, systolic murmur PMI left sternal border  Abdomen: Soft, nontender, no masses, no organomegaly.    Extremities: Trace edema, no clubbing or cyanosis        Medications:    Current Facility-Administered Medications:   •  acetaminophen (TYLENOL) tablet 650 mg, 650 mg, Oral, Q4H PRN, 650 mg at 22 0849 **OR** acetaminophen (TYLENOL) 160 MG/5ML solution 650 mg, 650 mg, Oral, Q4H PRN **OR** acetaminophen (TYLENOL) suppository 650 mg, 650 mg, Rectal, Q4H PRN, Black Moctezuma APRN  •  allopurinol (ZYLOPRIM) tablet 100 mg, 100 mg, Oral, Daily, Love, Black " KELLIE APRN, 100 mg at 07/07/22 0809  •  ALPRAZolam (XANAX) tablet 0.25 mg, 0.25 mg, Oral, Nightly PRN, Black Moctezuma APRN, 0.25 mg at 07/06/22 2016  •  aluminum-magnesium hydroxide-simethicone (MAALOX MAX) 400-400-40 MG/5ML suspension 15 mL, 15 mL, Oral, Q6H PRN, Black Moctezuma APRN  •  arformoterol (BROVANA) nebulizer solution 15 mcg, 15 mcg, Nebulization, BID - RT, Black Moctezuma APRN, 15 mcg at 07/07/22 0649  •  budesonide (PULMICORT) nebulizer solution 1 mg, 1 mg, Nebulization, BID - RT, Black Moctezuma APRN, 1 mg at 07/07/22 0649  •  calcium gluconate 1g/50ml 0.675% NaCl IV SOLN, 1 g, Intravenous, Once, Black Moctezuma APRN  •  citalopram (CeleXA) tablet 10 mg, 10 mg, Oral, Daily, Black Moctezuma APRN, 10 mg at 07/07/22 0809  •  dextrose (D50W) (25 g/50 mL) IV injection 25 g, 25 g, Intravenous, Q15 Min PRN, Black Moctezuma APRN  •  dextrose (D50W) (25 g/50 mL) IV injection 50 mL, 50 mL, Intravenous, Q1H PRN, Black Moctezuma APRN  •  dextrose (GLUTOSE) oral gel 15 g, 15 g, Oral, Q15 Min PRN, Black Moctezuma APRN  •  diphenhydrAMINE (BENADRYL) injection 25 mg, 25 mg, Intravenous, Q6H PRN, Black Moctezuma APRN, 25 mg at 07/06/22 1157  •  gabapentin (NEURONTIN) capsule 300 mg, 300 mg, Oral, TID, Black Moctezuma APRN, 300 mg at 07/07/22 0809  •  glucagon (human recombinant) (GLUCAGEN DIAGNOSTIC) 1 mg in sterile water (preservative free) 1 mL injection, 1 mg, Intramuscular, Q15 Min PRN, Black Moctezuma APRN  •  guaiFENesin (MUCINEX) 12 hr tablet 600 mg, 600 mg, Oral, Q12H, Black Moctezuma APRN, 600 mg at 07/07/22 0809  •  guaiFENesin-codeine (GUAIFENESIN AC) 100-10 MG/5ML liquid 5 mL, 5 mL, Oral, Q4H PRN, Black Moctezuma APRN  •  insulin glargine (LANTUS, SEMGLEE) injection 18 Units, 18 Units, Subcutaneous, Daily, Black Moctezuma APRN, 18 Units at 07/07/22 0809  •  insulin lispro (ADMELOG) injection 0-12 Units, 0-12 Units, Subcutaneous, TID With Meals, 4 Units at 07/07/22 0810 **AND** [DISCONTINUED]  insulin lispro (ADMELOG) injection 0-24 Units, 0-24 Units, Subcutaneous, PRN, Love, Black E, APRN  •  insulin lispro (ADMELOG) injection 6 Units, 6 Units, Subcutaneous, TID With Meals, Love, Black E, APRN, 6 Units at 07/07/22 0809  •  melatonin tablet 5 mg, 5 mg, Oral, Nightly PRN, Love, Black E, APRN  •  metoprolol succinate XL (TOPROL-XL) 24 hr tablet 25 mg, 25 mg, Oral, Daily, Love, Black E, APRN, 25 mg at 07/07/22 0809  •  ondansetron (ZOFRAN) tablet 4 mg, 4 mg, Oral, Q6H PRN, 4 mg at 07/05/22 0940 **OR** ondansetron (ZOFRAN) injection 4 mg, 4 mg, Intravenous, Q6H PRN, Love, Black E, APRN  •  oxyCODONE (ROXICODONE) immediate release tablet 10 mg, 10 mg, Oral, Q4H PRN, Love, Black E, APRN, 10 mg at 07/07/22 0809  •  sodium chloride 0.9 % flush 10 mL, 10 mL, Intravenous, PRN, Love, Black E, APRN  •  [COMPLETED] Insert peripheral IV, , , Once **AND** sodium chloride 0.9 % flush 10 mL, 10 mL, Intravenous, PRN, Love, Black E, APRN  •  sodium chloride 0.9 % flush 10 mL, 10 mL, Intravenous, Q12H, Love, Black E, APRN, 10 mL at 07/07/22 0814  •  sodium chloride 0.9 % flush 10 mL, 10 mL, Intravenous, PRN, Love, Black E, APRN  •  sodium chloride 0.9 % infusion, 100 mL/hr, Intravenous, Continuous, Love, Black E, APRN, Last Rate: 100 mL/hr at 07/06/22 2207, 100 mL/hr at 07/06/22 2207  •  traZODone (DESYREL) tablet 50 mg, 50 mg, Oral, Nightly, Love, Black E, APRN, 50 mg at 07/06/22 2015    Data Review:  All labs (24hrs):   Recent Results (from the past 24 hour(s))   POC Glucose Once    Collection Time: 07/06/22 11:48 AM    Specimen: Blood   Result Value Ref Range    Glucose 231 (H) 70 - 105 mg/dL   POC Glucose Once    Collection Time: 07/06/22  4:43 PM    Specimen: Blood   Result Value Ref Range    Glucose 314 (H) 70 - 105 mg/dL   POC Glucose Once    Collection Time: 07/06/22  8:10 PM    Specimen: Blood   Result Value Ref Range    Glucose 306 (H) 70 - 105 mg/dL   POC Glucose Once    Collection Time:  07/06/22  9:44 PM    Specimen: Blood   Result Value Ref Range    Glucose 211 (H) 70 - 105 mg/dL   Prepare RBC, 1 Units    Collection Time: 07/07/22  2:00 AM   Result Value Ref Range    Product Code Q6727R44     Unit Number [id number]     UNIT  ABO A     UNIT  RH POS     Crossmatch Interpretation Compatible     Dispense Status PT     Blood Expiration Date 202208082359     Blood Type Barcode 6200    Comprehensive Metabolic Panel    Collection Time: 07/07/22  5:44 AM    Specimen: Blood   Result Value Ref Range    Glucose 312 (H) 65 - 99 mg/dL    BUN 22 (H) 6 - 20 mg/dL    Creatinine 0.56 (L) 0.57 - 1.00 mg/dL    Sodium 137 136 - 145 mmol/L    Potassium 5.2 3.5 - 5.2 mmol/L    Chloride 104 98 - 107 mmol/L    CO2 25.0 22.0 - 29.0 mmol/L    Calcium 8.2 (L) 8.6 - 10.5 mg/dL    Total Protein 6.4 6.0 - 8.5 g/dL    Albumin 3.20 (L) 3.50 - 5.20 g/dL    ALT (SGPT) 8 1 - 33 U/L    AST (SGOT) 13 1 - 32 U/L    Alkaline Phosphatase 476 (H) 39 - 117 U/L    Total Bilirubin 0.4 0.0 - 1.2 mg/dL    Globulin 3.2 gm/dL    A/G Ratio 1.0 g/dL    BUN/Creatinine Ratio 39.3 (H) 7.0 - 25.0    Anion Gap 8.0 5.0 - 15.0 mmol/L    eGFR 114.1 >60.0 mL/min/1.73   Uric Acid    Collection Time: 07/07/22  5:44 AM    Specimen: Blood   Result Value Ref Range    Uric Acid 4.2 2.4 - 5.7 mg/dL   Magnesium    Collection Time: 07/07/22  5:44 AM    Specimen: Blood   Result Value Ref Range    Magnesium 1.9 1.6 - 2.6 mg/dL   Phosphorus    Collection Time: 07/07/22  5:44 AM    Specimen: Blood   Result Value Ref Range    Phosphorus 3.6 2.5 - 4.5 mg/dL   CBC Auto Differential    Collection Time: 07/07/22  5:44 AM    Specimen: Blood   Result Value Ref Range    WBC 43.20 (C) 3.40 - 10.80 10*3/mm3    RBC 2.53 (L) 3.77 - 5.28 10*6/mm3    Hemoglobin 6.4 (C) 12.0 - 15.9 g/dL    Hematocrit 20.8 (C) 34.0 - 46.6 %    MCV 82.2 79.0 - 97.0 fL    MCH 25.3 (L) 26.6 - 33.0 pg    MCHC 30.8 (L) 31.5 - 35.7 g/dL    RDW 21.8 (H) 12.3 - 15.4 %    RDW-SD 62.1 (H) 37.0 - 54.0 fl     MPV 7.5 6.0 - 12.0 fL    Platelets 125 (L) 140 - 450 10*3/mm3   Scan Slide    Collection Time: 07/07/22  5:44 AM    Specimen: Blood   Result Value Ref Range    Scan Slide     Manual Differential    Collection Time: 07/07/22  5:44 AM    Specimen: Blood   Result Value Ref Range    Neutrophil % 41.0 (L) 42.7 - 76.0 %    Lymphocyte % 5.0 (L) 19.6 - 45.3 %    Monocyte % 3.0 (L) 5.0 - 12.0 %    Eosinophil % 3.0 0.3 - 6.2 %    Basophil % 18.0 (H) 0.0 - 1.5 %    Bands %  4.0 0.0 - 5.0 %    Metamyelocyte % 15.0 (H) 0.0 - 0.0 %    Myelocyte % 7.0 (H) 0.0 - 0.0 %    Promyelocyte % 1.0 (H) 0.0 - 0.0 %    Blasts % 3.0 (H) 0.0 - 0.0 %    Neutrophils Absolute 19.44 (H) 1.70 - 7.00 10*3/mm3    Lymphocytes Absolute 2.16 0.70 - 3.10 10*3/mm3    Monocytes Absolute 1.30 (H) 0.10 - 0.90 10*3/mm3    Eosinophils Absolute 1.30 (H) 0.00 - 0.40 10*3/mm3    Basophils Absolute 7.78 (H) 0.00 - 0.20 10*3/mm3    nRBC 3.0 (H) 0.0 - 0.2 /100 WBC    Anisocytosis Slight/1+ None Seen    Basophilic Stippling Slight/1+ None Seen    Dimorphic RBC Present None Seen    Poikilocytes Slight/1+ None Seen    WBC Morphology Normal Normal    Platelet Estimate Decreased Normal   POC Glucose Once    Collection Time: 07/07/22  7:29 AM    Specimen: Blood   Result Value Ref Range    Glucose 220 (H) 70 - 105 mg/dL   Hemoglobin & Hematocrit, Blood    Collection Time: 07/07/22  7:30 AM    Specimen: Blood   Result Value Ref Range    Hemoglobin 6.9 (C) 12.0 - 15.9 g/dL    Hematocrit 22.5 (L) 34.0 - 46.6 %   Prepare RBC, 1 Units    Collection Time: 07/07/22  7:55 AM   Result Value Ref Range    Product Code C7820G91     Unit Number [id number]     UNIT  ABO O     UNIT  RH POS     Crossmatch Interpretation Compatible     Dispense Status IS     Blood Expiration Date 202207192359     Blood Type Barcode 5100         Imaging:  XR Chest 1 View  Narrative:    DATE OF EXAM:   7/7/2022 2:33 AM     PROCEDURE:   XR CHEST 1 VW-     INDICATIONS:   Shortness of breath; C92.10-Chronic  myeloid leukemia, BCR/ABL-positive,  not having achieved remission; J18.9-Pneumonia, unspecified organism;  I50.9-Heart failure, unspecified     COMPARISON:  07/06/2022     TECHNIQUE:   Portable chest radiograph.     FINDINGS:   The heart is mildly enlarged. There are persistent mixed interstitial  and airspace opacities bilaterally not significantly changed from  07/06/2022 but improved from 06/29/2022. No pneumothorax. No large  pleural effusion. The osseous structures are grossly intact.     Impression: Persistent mixed interstitial and alveolar opacities which may relate to  pulmonary edema, not significantly changed from most recent comparison.     Electronically Signed By-Zac Turcios MD On:7/7/2022 7:20 AM  This report was finalized on [id number] by  Zac Turcios MD.       ASSESSMENT:  Acute respiratory distress  Lung infiltrate could be due to hematology malignancy, pneumonitis  Hyperkalemia  Blast crisis  CML (chronic myelocytic leukemia) (HCC)    Depression with anxiety    History of pulmonary embolism    Type 2 diabetes mellitus with diabetic polyneuropathy, with long-term current use of insulin (HCC)    Moderate malnutrition (HCC)    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)    Acute systolic CHF (congestive heart failure) (HCC)    Menorrhagia       PLAN:  Correct potassium  Watch h/h  Gyn following   Bone marrow biopsy today  Regard the lung infiltrate we will going to monitor for now as long as her oxygenation improving   Bronchodilator  Inhaled corticosteroids  Electrolytes/ glycemic control  DVT and GI prophylaxis.    Total Critical care time in direct medical management ( ) minutes. This time specifically excludes time spent performing procedures.  Shanelle Rodgers MD. D, ABSM.     7/7/2022  09:07 EDT

## 2022-07-08 LAB
ALBUMIN SERPL-MCNC: 3.2 G/DL (ref 3.5–5.2)
ALBUMIN/GLOB SERPL: 0.9 G/DL
ALP SERPL-CCNC: 565 U/L (ref 39–117)
ALT SERPL W P-5'-P-CCNC: 17 U/L (ref 1–33)
ANION GAP SERPL CALCULATED.3IONS-SCNC: 9 MMOL/L (ref 5–15)
ANISOCYTOSIS BLD QL: ABNORMAL
AST SERPL-CCNC: 23 U/L (ref 1–32)
BASOPHILS # BLD MANUAL: 4.53 10*3/MM3 (ref 0–0.2)
BASOPHILS NFR BLD MANUAL: 10 % (ref 0–1.5)
BH BB BLOOD EXPIRATION DATE: NORMAL
BH BB BLOOD TYPE BARCODE: 5100
BH BB DISPENSE STATUS: NORMAL
BH BB PRODUCT CODE: NORMAL
BH BB UNIT NUMBER: NORMAL
BILIRUB SERPL-MCNC: 0.6 MG/DL (ref 0–1.2)
BLASTS NFR BLD MANUAL: 3 % (ref 0–0)
BUN SERPL-MCNC: 13 MG/DL (ref 6–20)
BUN/CREAT SERPL: 25.5 (ref 7–25)
CALCIUM SPEC-SCNC: 8.5 MG/DL (ref 8.6–10.5)
CHLORIDE SERPL-SCNC: 104 MMOL/L (ref 98–107)
CO2 SERPL-SCNC: 25 MMOL/L (ref 22–29)
CREAT SERPL-MCNC: 0.51 MG/DL (ref 0.57–1)
CROSSMATCH INTERPRETATION: NORMAL
DEPRECATED RDW RBC AUTO: 58.6 FL (ref 37–54)
EGFRCR SERPLBLD CKD-EPI 2021: 116.8 ML/MIN/1.73
EOSINOPHIL # BLD MANUAL: 0.45 10*3/MM3 (ref 0–0.4)
EOSINOPHIL NFR BLD MANUAL: 1 % (ref 0.3–6.2)
ERYTHROCYTE [DISTWIDTH] IN BLOOD BY AUTOMATED COUNT: 20.6 % (ref 12.3–15.4)
GLOBULIN UR ELPH-MCNC: 3.5 GM/DL
GLUCOSE BLDC GLUCOMTR-MCNC: 118 MG/DL (ref 70–105)
GLUCOSE BLDC GLUCOMTR-MCNC: 162 MG/DL (ref 70–105)
GLUCOSE BLDC GLUCOMTR-MCNC: 179 MG/DL (ref 70–105)
GLUCOSE BLDC GLUCOMTR-MCNC: 194 MG/DL (ref 70–105)
GLUCOSE SERPL-MCNC: 153 MG/DL (ref 65–99)
HCT VFR BLD AUTO: 23.6 % (ref 34–46.6)
HCT VFR BLD AUTO: 25.8 % (ref 34–46.6)
HGB BLD-MCNC: 7.5 G/DL (ref 12–15.9)
HGB BLD-MCNC: 8.3 G/DL (ref 12–15.9)
LYMPHOCYTES # BLD MANUAL: 1.36 10*3/MM3 (ref 0.7–3.1)
LYMPHOCYTES NFR BLD MANUAL: 6 % (ref 5–12)
MAGNESIUM SERPL-MCNC: 1.9 MG/DL (ref 1.6–2.6)
MCH RBC QN AUTO: 26.5 PG (ref 26.6–33)
MCHC RBC AUTO-ENTMCNC: 31.6 G/DL (ref 31.5–35.7)
MCV RBC AUTO: 83.6 FL (ref 79–97)
METAMYELOCYTES NFR BLD MANUAL: 10 % (ref 0–0)
MONOCYTES # BLD: 2.72 10*3/MM3 (ref 0.1–0.9)
MYELOCYTES NFR BLD MANUAL: 3 % (ref 0–0)
NEUTROPHILS # BLD AUTO: 28.99 10*3/MM3 (ref 1.7–7)
NEUTROPHILS NFR BLD MANUAL: 59 % (ref 42.7–76)
NEUTS BAND NFR BLD MANUAL: 5 % (ref 0–5)
PHOSPHATE SERPL-MCNC: 4.4 MG/DL (ref 2.5–4.5)
PLATELET # BLD AUTO: 119 10*3/MM3 (ref 140–450)
PMV BLD AUTO: 8.2 FL (ref 6–12)
POIKILOCYTOSIS BLD QL SMEAR: ABNORMAL
POTASSIUM SERPL-SCNC: 4.9 MMOL/L (ref 3.5–5.2)
PROT SERPL-MCNC: 6.7 G/DL (ref 6–8.5)
RBC # BLD AUTO: 2.83 10*6/MM3 (ref 3.77–5.28)
REF LAB TEST METHOD: NORMAL
SCAN SLIDE: NORMAL
SMALL PLATELETS BLD QL SMEAR: ABNORMAL
SODIUM SERPL-SCNC: 138 MMOL/L (ref 136–145)
UNIT  ABO: NORMAL
UNIT  RH: NORMAL
URATE SERPL-MCNC: 4.6 MG/DL (ref 2.4–5.7)
VARIANT LYMPHS NFR BLD MANUAL: 3 % (ref 19.6–45.3)
WBC MORPH BLD: NORMAL
WBC NRBC COR # BLD: 45.3 10*3/MM3 (ref 3.4–10.8)

## 2022-07-08 PROCEDURE — 99231 SBSQ HOSP IP/OBS SF/LOW 25: CPT | Performed by: NURSE PRACTITIONER

## 2022-07-08 PROCEDURE — 82962 GLUCOSE BLOOD TEST: CPT

## 2022-07-08 PROCEDURE — 80053 COMPREHEN METABOLIC PANEL: CPT | Performed by: NURSE PRACTITIONER

## 2022-07-08 PROCEDURE — 99231 SBSQ HOSP IP/OBS SF/LOW 25: CPT | Performed by: HOSPITALIST

## 2022-07-08 PROCEDURE — 85014 HEMATOCRIT: CPT | Performed by: NURSE PRACTITIONER

## 2022-07-08 PROCEDURE — 63710000001 INSULIN GLARGINE PER 5 UNITS: Performed by: INTERNAL MEDICINE

## 2022-07-08 PROCEDURE — 99232 SBSQ HOSP IP/OBS MODERATE 35: CPT | Performed by: INTERNAL MEDICINE

## 2022-07-08 PROCEDURE — 63710000001 INSULIN LISPRO (HUMAN) PER 5 UNITS: Performed by: NURSE PRACTITIONER

## 2022-07-08 PROCEDURE — 83735 ASSAY OF MAGNESIUM: CPT | Performed by: NURSE PRACTITIONER

## 2022-07-08 PROCEDURE — 94799 UNLISTED PULMONARY SVC/PX: CPT

## 2022-07-08 PROCEDURE — 94664 DEMO&/EVAL PT USE INHALER: CPT

## 2022-07-08 PROCEDURE — 85007 BL SMEAR W/DIFF WBC COUNT: CPT | Performed by: NURSE PRACTITIONER

## 2022-07-08 PROCEDURE — 84100 ASSAY OF PHOSPHORUS: CPT | Performed by: NURSE PRACTITIONER

## 2022-07-08 PROCEDURE — 85018 HEMOGLOBIN: CPT | Performed by: NURSE PRACTITIONER

## 2022-07-08 PROCEDURE — 84550 ASSAY OF BLOOD/URIC ACID: CPT | Performed by: NURSE PRACTITIONER

## 2022-07-08 PROCEDURE — 85025 COMPLETE CBC W/AUTO DIFF WBC: CPT | Performed by: NURSE PRACTITIONER

## 2022-07-08 RX ORDER — HYDROXYUREA 500 MG/1
500 CAPSULE ORAL DAILY
Status: DISCONTINUED | OUTPATIENT
Start: 2022-07-09 | End: 2022-07-09 | Stop reason: HOSPADM

## 2022-07-08 RX ORDER — LOSARTAN POTASSIUM 25 MG/1
25 TABLET ORAL
Status: DISCONTINUED | OUTPATIENT
Start: 2022-07-08 | End: 2022-07-09 | Stop reason: HOSPADM

## 2022-07-08 RX ADMIN — INSULIN GLARGINE 20 UNITS: 100 INJECTION, SOLUTION SUBCUTANEOUS at 08:56

## 2022-07-08 RX ADMIN — INSULIN LISPRO 6 UNITS: 100 INJECTION, SOLUTION INTRAVENOUS; SUBCUTANEOUS at 11:57

## 2022-07-08 RX ADMIN — INSULIN LISPRO 6 UNITS: 100 INJECTION, SOLUTION INTRAVENOUS; SUBCUTANEOUS at 08:57

## 2022-07-08 RX ADMIN — OXYCODONE 10 MG: 5 TABLET ORAL at 14:53

## 2022-07-08 RX ADMIN — ALPRAZOLAM 0.25 MG: 0.25 TABLET ORAL at 20:35

## 2022-07-08 RX ADMIN — OXYCODONE 10 MG: 5 TABLET ORAL at 20:35

## 2022-07-08 RX ADMIN — Medication 10 ML: at 08:57

## 2022-07-08 RX ADMIN — METOPROLOL SUCCINATE 25 MG: 25 TABLET, FILM COATED, EXTENDED RELEASE ORAL at 08:56

## 2022-07-08 RX ADMIN — ALLOPURINOL 100 MG: 100 TABLET ORAL at 08:56

## 2022-07-08 RX ADMIN — TRAZODONE HYDROCHLORIDE 50 MG: 50 TABLET ORAL at 20:35

## 2022-07-08 RX ADMIN — GUAIFENESIN 600 MG: 600 TABLET, EXTENDED RELEASE ORAL at 20:35

## 2022-07-08 RX ADMIN — LOSARTAN POTASSIUM 25 MG: 25 TABLET, FILM COATED ORAL at 17:16

## 2022-07-08 RX ADMIN — Medication 10 ML: at 20:35

## 2022-07-08 RX ADMIN — OXYCODONE 10 MG: 5 TABLET ORAL at 09:00

## 2022-07-08 RX ADMIN — INSULIN LISPRO 6 UNITS: 100 INJECTION, SOLUTION INTRAVENOUS; SUBCUTANEOUS at 17:16

## 2022-07-08 RX ADMIN — INSULIN LISPRO 2 UNITS: 100 INJECTION, SOLUTION INTRAVENOUS; SUBCUTANEOUS at 08:56

## 2022-07-08 RX ADMIN — GUAIFENESIN 600 MG: 600 TABLET, EXTENDED RELEASE ORAL at 08:56

## 2022-07-08 RX ADMIN — SODIUM CHLORIDE 50 ML/HR: 9 INJECTION, SOLUTION INTRAVENOUS at 14:11

## 2022-07-08 RX ADMIN — GABAPENTIN 300 MG: 300 CAPSULE ORAL at 08:56

## 2022-07-08 RX ADMIN — CITALOPRAM HYDROBROMIDE 10 MG: 20 TABLET ORAL at 08:56

## 2022-07-08 RX ADMIN — INSULIN LISPRO 2 UNITS: 100 INJECTION, SOLUTION INTRAVENOUS; SUBCUTANEOUS at 17:16

## 2022-07-08 RX ADMIN — GABAPENTIN 300 MG: 300 CAPSULE ORAL at 20:35

## 2022-07-08 RX ADMIN — GABAPENTIN 300 MG: 300 CAPSULE ORAL at 17:17

## 2022-07-08 NOTE — PROGRESS NOTES
Per RN patient may not be getting discharged today, will call if anything changes to do the 6 minute walk so we can have it closer to discharge.

## 2022-07-08 NOTE — PLAN OF CARE
Goal Outcome Evaluation:      Pt is resting in bed on room air and tolerating well. Pt stated she is still having vaginal blood clots and states she has had 5 today. Walking oximetry to be done tomorrow for D/C. VSS with no complaints at this time.    Problem: Adult Inpatient Plan of Care  Goal: Plan of Care Review  Outcome: Ongoing, Progressing

## 2022-07-08 NOTE — PROGRESS NOTES
Hematology/Oncology Inpatient Progress Note    PATIENT NAME: Nieves Mc  : 1975  MRN: [id number]    CHIEF COMPLAINT: Dyspnea and cough    HISTORY OF PRESENT ILLNESS:      Nieves Mc is a 46 y.o. female who presented to [facility] on 2022 with complaints of worsening shortness of breath, cough,, chest pain for a week.  CT scan shows continued severe infiltrates unchanged from last CT scan.  Patient was also found to be in blast crisis with her history of CML and blasts of 27% on CBC.  Patient was admitted for further evaluation and treatment.     22  Hematology/Oncology was consulted for established patient with CML presenting with 27% blasts on CBC.  Patient is currently on Tasigna twice daily and Hydrea with history of noncompliance with medications and recent office visit with WBC of 200,000 as well as Hgb of 7.7 due to uncontrolled CML.  Patient was continued on Tasigna and Hydrea at office visit.  After presenting yesterday with blast crisis at 27%, her CBC today shows decreased blasts at 17%, and WBC of 205,000.    2022 -.6, hemoglobin 7.6, platelet 222.     2022: No major changes in her symptoms. She had clear evidence of tumor lysis syndrome. In spite of the institution of allopurinol, her uric acid had increased further. She had hyperkalemia and hyperphosphatemia. Rasburicase was prescribed. She was seen by Nephrology.     2022: Much better had been transferred out of intensive care.      Subjective   About the same. Continues to feel better on the oxygen. Able to eat.     ROS:  Review of Systems   Constitutional: Positive for fatigue. Negative for activity change, appetite change, chills, diaphoresis, fever and unexpected weight change.   HENT: Negative for congestion, dental problem, drooling, ear discharge, ear pain, facial swelling, hearing loss, mouth sores, nosebleeds, postnasal drip, rhinorrhea, sinus pressure, sinus pain, sneezing, sore  throat, tinnitus, trouble swallowing and voice change.    Eyes: Negative for photophobia, pain, discharge, redness, itching and visual disturbance.   Respiratory: Positive for cough. Negative for apnea, choking, chest tightness, shortness of breath, wheezing and stridor.    Cardiovascular: Negative for chest pain, palpitations and leg swelling.   Gastrointestinal: Negative for abdominal distention, abdominal pain, anal bleeding, blood in stool, constipation, diarrhea, nausea, rectal pain and vomiting.   Endocrine: Negative for cold intolerance, heat intolerance, polydipsia, polyphagia and polyuria.   Genitourinary: Negative for decreased urine volume, difficulty urinating, dysuria, flank pain, frequency, genital sores, hematuria and urgency.   Musculoskeletal: Negative for arthralgias, back pain, gait problem, joint swelling, myalgias, neck pain and neck stiffness.   Skin: Negative for color change, pallor and rash.   Neurological: Negative for dizziness, tremors, seizures, syncope, facial asymmetry, speech difficulty, weakness, light-headedness, numbness and headaches.   Hematological: Negative for adenopathy. Does not bruise/bleed easily.   Psychiatric/Behavioral: Negative for agitation, behavioral problems, confusion, decreased concentration, hallucinations, self-injury, sleep disturbance and suicidal ideas. The patient is not nervous/anxious.       MEDICATIONS:    Scheduled Meds:  allopurinol, 100 mg, Oral, Daily  arformoterol, 15 mcg, Nebulization, BID - RT  budesonide, 1 mg, Nebulization, BID - RT  citalopram, 10 mg, Oral, Daily  gabapentin, 300 mg, Oral, TID  guaiFENesin, 600 mg, Oral, Q12H  insulin glargine, 20 Units, Subcutaneous, Daily  insulin lispro, 0-12 Units, Subcutaneous, TID With Meals  insulin lispro, 6 Units, Subcutaneous, TID With Meals  losartan, 25 mg, Oral, Q24H  metoprolol succinate XL, 25 mg, Oral, Daily  sodium chloride, 10 mL, Intravenous, Q12H  traZODone, 50 mg, Oral, Nightly      "  Continuous Infusions:  sodium chloride, 50 mL/hr, Last Rate: 50 mL/hr (07/08/22 1411)       PRN Meds:  •  acetaminophen **OR** acetaminophen **OR** acetaminophen  •  ALPRAZolam  •  aluminum-magnesium hydroxide-simethicone  •  dextrose  •  dextrose  •  dextrose  •  diphenhydrAMINE  •  glucagon (human recombinant)  •  guaiFENesin-codeine  •  melatonin  •  ondansetron **OR** ondansetron  •  oxyCODONE  •  sodium chloride  •  [COMPLETED] Insert peripheral IV **AND** sodium chloride  •  sodium chloride     ALLERGIES:    Allergies   Allergen Reactions   • Hydromorphone GI Intolerance     dilaudid   • Morphine Nausea And Vomiting   • Propofol Hives     Previously tolerated being premedicated with diphenhydramine and famotadine     Objective    VITALS:   /71 (BP Location: Left arm, Patient Position: Sitting)   Pulse 102   Temp 98 °F (36.7 °C) (Oral)   Resp 20   Ht 160 cm (63\")   Wt 58 kg (127 lb 13.9 oz)   LMP 05/25/2022 (Approximate)   SpO2 98%   BMI 22.65 kg/m²     PHYSICAL EXAM: (performed by MD)  Physical Exam  Constitutional:       General: She is not in acute distress.     Appearance: Normal appearance. She is not ill-appearing, toxic-appearing or diaphoretic.   HENT:      Head: Normocephalic and atraumatic.      Right Ear: External ear normal.      Left Ear: External ear normal.      Nose: Nose normal.      Mouth/Throat:      Mouth: Mucous membranes are moist.      Pharynx: Oropharynx is clear. No oropharyngeal exudate or posterior oropharyngeal erythema.   Eyes:      General: No scleral icterus.        Right eye: No discharge.         Left eye: No discharge.      Conjunctiva/sclera: Conjunctivae normal.      Pupils: Pupils are equal, round, and reactive to light.   Cardiovascular:      Rate and Rhythm: Normal rate and regular rhythm.      Pulses: Normal pulses.      Heart sounds: Normal heart sounds. No murmur heard.    No friction rub. No gallop.   Pulmonary:      Effort: Pulmonary effort is normal. " No respiratory distress.      Breath sounds: No stridor. Rhonchi present. No wheezing or rales.   Abdominal:      General: Abdomen is flat. Bowel sounds are normal. There is distension.      Palpations: Abdomen is soft. There is no mass.      Tenderness: There is no abdominal tenderness. There is no right CVA tenderness, left CVA tenderness, guarding or rebound.      Hernia: No hernia is present.   Musculoskeletal:         General: No swelling, tenderness, deformity or signs of injury. Normal range of motion.      Cervical back: Normal range of motion and neck supple. No rigidity.      Right lower leg: No edema.      Left lower leg: No edema.   Lymphadenopathy:      Cervical: No cervical adenopathy.      Upper Body:      Right upper body: No pectoral adenopathy.   Skin:     General: Skin is warm.      Coloration: Skin is not jaundiced.      Findings: No bruising, lesion or rash.   Neurological:      General: No focal deficit present.      Mental Status: She is alert and oriented to person, place, and time.      Cranial Nerves: No cranial nerve deficit.      Motor: No weakness.      Gait: Gait normal.   Psychiatric:         Mood and Affect: Mood normal.         Behavior: Behavior normal.         Thought Content: Thought content normal.         Judgment: Judgment normal.     JAMEL Truong MD performed the physical exam on 7/8/2022 as documented before.     RECENT LABS:  Lab Results (last 24 hours)     Procedure Component Value Units Date/Time    POC Glucose Once [[id number]]  (Abnormal) Collected: 07/08/22 1600    Specimen: Blood Updated: 07/08/22 1602     Glucose 179 mg/dL      Comment: Serial Number: [id number]:  [id number]       POC Glucose Once [[id number]]  (Abnormal) Collected: 07/08/22 1127    Specimen: Blood Updated: 07/08/22 1133     Glucose 118 mg/dL      Comment: Serial Number: [id number]:  [id number]       POC Glucose Once [[id number]]  (Abnormal) Collected: 07/08/22 0732    Specimen: Blood  Updated: 07/08/22 0734     Glucose 162 mg/dL      Comment: Serial Number: [id number]:  [id number]       Flow Cytometry [[id number]] Collected: 07/06/22 1113    Specimen: Tissue from Iliac Crest, Left - Biopsy Updated: 07/08/22 0702     Reference Lab Report See attached report.     Manual Differential [[id number]]  (Abnormal) Collected: 07/08/22 0450    Specimen: Blood Updated: 07/08/22 0603     Neutrophil % 59.0 %      Lymphocyte % 3.0 %      Monocyte % 6.0 %      Eosinophil % 1.0 %      Basophil % 10.0 %      Bands %  5.0 %      Metamyelocyte % 10.0 %      Myelocyte % 3.0 %      Blasts % 3.0 %      Neutrophils Absolute 28.99 10*3/mm3      Lymphocytes Absolute 1.36 10*3/mm3      Monocytes Absolute 2.72 10*3/mm3      Eosinophils Absolute 0.45 10*3/mm3      Basophils Absolute 4.53 10*3/mm3      Anisocytosis Slight/1+     Poikilocytes Slight/1+     WBC Morphology Normal     Platelet Estimate Decreased    Narrative:      Reviewed by Pathologist within the past 30 days on 7/6/22 .      CBC & Differential [[id number]]  (Abnormal) Collected: 07/08/22 0450    Specimen: Blood Updated: 07/08/22 0603    Narrative:      The following orders were created for panel order CBC & Differential.  Procedure                               Abnormality         Status                     ---------                               -----------         ------                     CBC Auto Differential[[id number]]        Abnormal            Final result               Scan Slide[[id number]]                                       Final result                 Please view results for these tests on the individual orders.    Scan Slide [[id number]] Collected: 07/08/22 0450    Specimen: Blood Updated: 07/08/22 0603     Scan Slide --     Comment: See Manual Differential Results       CBC Auto Differential [[id number]]  (Abnormal) Collected: 07/08/22 0450    Specimen: Blood Updated: 07/08/22 0603     WBC 45.30 10*3/mm3      RBC 2.83 10*6/mm3      Hemoglobin  7.5 g/dL      Hematocrit 23.6 %      MCV 83.6 fL      MCH 26.5 pg      MCHC 31.6 g/dL      RDW 20.6 %      RDW-SD 58.6 fl      MPV 8.2 fL      Platelets 119 10*3/mm3     Narrative:      The previously reported component NRBC is no longer being reported. Previous result was 1.4 /100 WBC (Reference Range: 0.0-0.2 /100 WBC) on 7/8/2022 at 0510 EDT.    Comprehensive Metabolic Panel [[id number]]  (Abnormal) Collected: 07/08/22 0450    Specimen: Blood Updated: 07/08/22 0527     Glucose 153 mg/dL      BUN 13 mg/dL      Creatinine 0.51 mg/dL      Sodium 138 mmol/L      Potassium 4.9 mmol/L      Comment: Slight hemolysis detected by analyzer. Results may be affected.        Chloride 104 mmol/L      CO2 25.0 mmol/L      Calcium 8.5 mg/dL      Total Protein 6.7 g/dL      Albumin 3.20 g/dL      ALT (SGPT) 17 U/L      AST (SGOT) 23 U/L      Comment: Slight hemolysis detected by analyzer. Results may be affected.        Alkaline Phosphatase 565 U/L      Total Bilirubin 0.6 mg/dL      Globulin 3.5 gm/dL      A/G Ratio 0.9 g/dL      BUN/Creatinine Ratio 25.5     Anion Gap 9.0 mmol/L      eGFR 116.8 mL/min/1.73      Comment: National Kidney Foundation and American Society of Nephrology (ASN) Task Force recommended calculation based on the Chronic Kidney Disease Epidemiology Collaboration (CKD-EPI) equation refit without adjustment for race.       Narrative:      GFR Normal >60  Chronic Kidney Disease <60  Kidney Failure <15      Uric Acid [[id number]]  (Normal) Collected: 07/08/22 0450    Specimen: Blood Updated: 07/08/22 0521     Uric Acid 4.6 mg/dL     Phosphorus [[id number]]  (Normal) Collected: 07/08/22 0450    Specimen: Blood Updated: 07/08/22 0521     Phosphorus 4.4 mg/dL     Magnesium [[id number]]  (Normal) Collected: 07/08/22 0450    Specimen: Blood Updated: 07/08/22 0521     Magnesium 1.9 mg/dL     POC Glucose Once [[id number]]  (Abnormal) Collected: 07/07/22 2037    Specimen: Blood Updated: 07/07/22 2038     Glucose 179 mg/dL       Comment: Serial Number: [id number]:  [id number]       POC Glucose Once [[id number]]  (Abnormal) Collected: 07/07/22 1618    Specimen: Blood Updated: 07/07/22 1619     Glucose 134 mg/dL      Comment: Serial Number: [id number]:  [id number]             IMAGING REVIEWED:  XR Chest 1 View    Result Date: 7/7/2022  Persistent mixed interstitial and alveolar opacities which may relate to pulmonary edema, not significantly changed from most recent comparison.  Electronically Signed By-Zac Turcios MD On:7/7/2022 7:20 AM This report was finalized on [id number] by  Zac Turcios MD.      Assessment & Plan   ASSESSMENT:  1. Tumor lysis syndrome. Resolved.   2. Chronic myelogenous leukemia. The final report of pathology is not yet out. The report of flow cytometry describes 8% myeloid blasts, which confirms chronic phase. Her treatment at this point could be with any of the tyrosine kinase inhibitors available. However her absolute lack of compliance will make the treatment very difficult. Will result the hydroxyurea at a much lower dose and follow closely  3. Lung disease.       PLAN:  1. As above.       Yifan Truong MD on 7/8/2022 at 16:20

## 2022-07-08 NOTE — PROGRESS NOTES
"                                                                                                                                      Nephrology  Progress Note                                        [facility]    Patient Identification    Name: Nieves Mc  Age: 46 y.o.  Sex: female  :  1975  MRN: [id number]      DATE OF SERVICE:  2022        Subective    Patient's without complaint  No shortness of breath       Objective   Scheduled Meds:allopurinol, 100 mg, Oral, Daily  arformoterol, 15 mcg, Nebulization, BID - RT  budesonide, 1 mg, Nebulization, BID - RT  citalopram, 10 mg, Oral, Daily  gabapentin, 300 mg, Oral, TID  guaiFENesin, 600 mg, Oral, Q12H  insulin glargine, 20 Units, Subcutaneous, Daily  insulin lispro, 0-12 Units, Subcutaneous, TID With Meals  insulin lispro, 6 Units, Subcutaneous, TID With Meals  metoprolol succinate XL, 25 mg, Oral, Daily  sodium chloride, 10 mL, Intravenous, Q12H  traZODone, 50 mg, Oral, Nightly          Continuous Infusions:sodium chloride, 50 mL/hr, Last Rate: 50 mL/hr (22 1109)        PRN Meds:•  acetaminophen **OR** acetaminophen **OR** acetaminophen  •  ALPRAZolam  •  aluminum-magnesium hydroxide-simethicone  •  dextrose  •  dextrose  •  dextrose  •  diphenhydrAMINE  •  glucagon (human recombinant)  •  guaiFENesin-codeine  •  melatonin  •  ondansetron **OR** ondansetron  •  oxyCODONE  •  sodium chloride  •  [COMPLETED] Insert peripheral IV **AND** sodium chloride  •  sodium chloride     Exam:  /70 (BP Location: Left arm, Patient Position: Lying)   Pulse 95   Temp 98 °F (36.7 °C) (Oral)   Resp 14   Ht 160 cm (63\")   Wt 58 kg (127 lb 13.9 oz)   LMP 2022 (Approximate)   SpO2 98%   BMI 22.65 kg/m²     Intake/Output last 3 shifts:  I/O last 3 completed shifts:  In: 3189.5 [P.O.:940; I.V.:1792; Blood:457.5]  Out: -     Intake/Output this shift:  I/O this shift:  In: 350 [P.O.:350]  Out: -     Physical exam:  General " Appearance:  Alert  Head:  Normocephalic, without obvious abnormality, atraumatic  Eyes:  PERRL, conjunctiva/corneas clear     Neck:  Supple,  no adenopathy;      Lungs:  Decreased BS occasion ronchi  Heart:  Regular rate and rhythm, S1 and S2 normal  Abdomen:  Soft, non-tender, bowel sounds active   Extremities: trace edema  Pulses: 2+ and symmetric all extremities  Skin:  No rashes or lesions       Data Review:  All labs (24hrs):   Recent Results (from the past 24 hour(s))   POC Glucose Once    Collection Time: 07/07/22  7:29 AM    Specimen: Blood   Result Value Ref Range    Glucose 220 (H) 70 - 105 mg/dL   Hemoglobin & Hematocrit, Blood    Collection Time: 07/07/22  7:30 AM    Specimen: Blood   Result Value Ref Range    Hemoglobin 6.9 (C) 12.0 - 15.9 g/dL    Hematocrit 22.5 (L) 34.0 - 46.6 %   POC Glucose Once    Collection Time: 07/07/22 11:34 AM    Specimen: Blood   Result Value Ref Range    Glucose 137 (H) 70 - 105 mg/dL   Hemoglobin & Hematocrit, Blood    Collection Time: 07/07/22 12:57 PM    Specimen: Blood   Result Value Ref Range    Hemoglobin 7.8 (L) 12.0 - 15.9 g/dL    Hematocrit 24.3 (L) 34.0 - 46.6 %   POC Glucose Once    Collection Time: 07/07/22  4:18 PM    Specimen: Blood   Result Value Ref Range    Glucose 134 (H) 70 - 105 mg/dL   POC Glucose Once    Collection Time: 07/07/22  8:37 PM    Specimen: Blood   Result Value Ref Range    Glucose 179 (H) 70 - 105 mg/dL   Prepare RBC, 1 Units    Collection Time: 07/07/22  9:21 PM   Result Value Ref Range    Product Code U7683N71     Unit Number [id number]     UNIT  ABO A     UNIT  RH POS     Crossmatch Interpretation Compatible     Dispense Status XM     Blood Expiration Date 202208112359     Blood Type Barcode 6200    Prepare RBC, 1 Units    Collection Time: 07/08/22  2:00 AM   Result Value Ref Range    Product Code A6311R85     Unit Number [id number]     UNIT  ABO O     UNIT  RH POS     Crossmatch Interpretation Compatible     Dispense Status PT      Blood Expiration Date [id number]     Blood Type Barcode 5100    Comprehensive Metabolic Panel    Collection Time: 07/08/22  4:50 AM    Specimen: Blood   Result Value Ref Range    Glucose 153 (H) 65 - 99 mg/dL    BUN 13 6 - 20 mg/dL    Creatinine 0.51 (L) 0.57 - 1.00 mg/dL    Sodium 138 136 - 145 mmol/L    Potassium 4.9 3.5 - 5.2 mmol/L    Chloride 104 98 - 107 mmol/L    CO2 25.0 22.0 - 29.0 mmol/L    Calcium 8.5 (L) 8.6 - 10.5 mg/dL    Total Protein 6.7 6.0 - 8.5 g/dL    Albumin 3.20 (L) 3.50 - 5.20 g/dL    ALT (SGPT) 17 1 - 33 U/L    AST (SGOT) 23 1 - 32 U/L    Alkaline Phosphatase 565 (H) 39 - 117 U/L    Total Bilirubin 0.6 0.0 - 1.2 mg/dL    Globulin 3.5 gm/dL    A/G Ratio 0.9 g/dL    BUN/Creatinine Ratio 25.5 (H) 7.0 - 25.0    Anion Gap 9.0 5.0 - 15.0 mmol/L    eGFR 116.8 >60.0 mL/min/1.73   Magnesium    Collection Time: 07/08/22  4:50 AM    Specimen: Blood   Result Value Ref Range    Magnesium 1.9 1.6 - 2.6 mg/dL   Phosphorus    Collection Time: 07/08/22  4:50 AM    Specimen: Blood   Result Value Ref Range    Phosphorus 4.4 2.5 - 4.5 mg/dL   Uric Acid    Collection Time: 07/08/22  4:50 AM    Specimen: Blood   Result Value Ref Range    Uric Acid 4.6 2.4 - 5.7 mg/dL   CBC Auto Differential    Collection Time: 07/08/22  4:50 AM    Specimen: Blood   Result Value Ref Range    WBC 45.30 (C) 3.40 - 10.80 10*3/mm3    RBC 2.83 (L) 3.77 - 5.28 10*6/mm3    Hemoglobin 7.5 (L) 12.0 - 15.9 g/dL    Hematocrit 23.6 (L) 34.0 - 46.6 %    MCV 83.6 79.0 - 97.0 fL    MCH 26.5 (L) 26.6 - 33.0 pg    MCHC 31.6 31.5 - 35.7 g/dL    RDW 20.6 (H) 12.3 - 15.4 %    RDW-SD 58.6 (H) 37.0 - 54.0 fl    MPV 8.2 6.0 - 12.0 fL    Platelets 119 (L) 140 - 450 10*3/mm3   Scan Slide    Collection Time: 07/08/22  4:50 AM    Specimen: Blood   Result Value Ref Range    Scan Slide     Manual Differential    Collection Time: 07/08/22  4:50 AM    Specimen: Blood   Result Value Ref Range    Neutrophil % 59.0 42.7 - 76.0 %    Lymphocyte % 3.0 (L) 19.6 -  45.3 %    Monocyte % 6.0 5.0 - 12.0 %    Eosinophil % 1.0 0.3 - 6.2 %    Basophil % 10.0 (H) 0.0 - 1.5 %    Bands %  5.0 0.0 - 5.0 %    Metamyelocyte % 10.0 (H) 0.0 - 0.0 %    Myelocyte % 3.0 (H) 0.0 - 0.0 %    Blasts % 3.0 (H) 0.0 - 0.0 %    Neutrophils Absolute 28.99 (H) 1.70 - 7.00 10*3/mm3    Lymphocytes Absolute 1.36 0.70 - 3.10 10*3/mm3    Monocytes Absolute 2.72 (H) 0.10 - 0.90 10*3/mm3    Eosinophils Absolute 0.45 (H) 0.00 - 0.40 10*3/mm3    Basophils Absolute 4.53 (H) 0.00 - 0.20 10*3/mm3    Anisocytosis Slight/1+ None Seen    Poikilocytes Slight/1+ None Seen    WBC Morphology Normal Normal    Platelet Estimate Decreased Normal          Imaging:  US Non-ob Transvaginal    Result Date: 7/4/2022  1.Uterus and left adnexa are unremarkable. 2.2.3 cm right adnexal cyst, likely physiologic.  Electronically Signed By-Amador Larry MD On:7/4/2022 4:43 PM This report was finalized on [id number] by  Amador Larry MD.    US Pelvis Complete    Result Date: 7/4/2022  1.Uterus and left adnexa are unremarkable. 2.2.3 cm right adnexal cyst, likely physiologic.  Electronically Signed By-Amador Larry MD On:7/4/2022 4:43 PM This report was finalized on [id number] by  Amador Larry MD.    XR Chest 1 View    Result Date: 7/7/2022  Persistent mixed interstitial and alveolar opacities which may relate to pulmonary edema, not significantly changed from most recent comparison.  Electronically Signed By-Zac Turcios MD On:7/7/2022 7:20 AM This report was finalized on [id number] by  Zac Turcios MD.    XR Chest 1 View    Result Date: 7/6/2022  Cardiomegaly with no evidence of pulmonary edema. Atelectasis in the lung bases due to low lung volumes  Electronically Signed By-Dom Corral On:7/6/2022 8:09 AM This report was finalized on [id number] by  Dom Corral, .    XR Chest 1 View    Result Date: 7/3/2022  1.Hazy bilateral airspace opacities, which could reflect pulmonary edema or pneumonia.  2.Cardiomegaly.  Electronically Signed By-Amador Larry MD On:7/3/2022 6:09 PM This report was finalized on [id number] by  Amador Larry MD.    CT Bone marrow biopsy and aspiration    Result Date: 7/6/2022  Technically successful CT-guided left posterior iliac 11-gauge bone marrow aspiration and core biopsy.  Electronically Signed By-Mary Corado MD On:7/6/2022 3:27 PM This report was finalized on [id number] by  Mary Corado MD.    XR Hip With or Without Pelvis 1 View Left    Result Date: 6/30/2022  Unremarkable left hip and pelvis  Electronically Signed By-Dom Corral On:6/30/2022 10:18 AM This report was finalized on [id number] by  Dom Corral, .      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)    CML (chronic myelocytic leukemia) (HCC)    Depression with anxiety    Normocytic hypochromic anemia    History of pulmonary embolism    Type 2 diabetes mellitus with diabetic polyneuropathy, with long-term current use of insulin (HCC)    Moderate malnutrition (HCC)    Acute diastolic CHF (congestive heart failure) (HCC)    Menorrhagia    Tumor lysis syndrome    Acute respiratory failure with hypoxia (HCC)    Thrombocytopenia (HCC)    Hyperuricemia    NICM (nonischemic cardiomyopathy) (HCC)              Acute hyperkalemia  Hyponatremia  Hyperuricemia  Hyperphosphatemia  Possible tumor lysis syndrome  Leukocytosis  Anemia  Thrombocytopenia       Kidney function remains stable  Potassium  Is better 4.9  Uric acid is better  Reduce IV fluids

## 2022-07-08 NOTE — PROGRESS NOTES
"PULMONARY CRITICAL CARE Progress  NOTE      PATIENT IDENTIFICATION:  Name: Nieves Mc  MRN: [id number]  :  1975     Age: 46 y.o.  Sex: female    DATE OF Note:  2022   Referring Physician: Shanelle Rodgers MD                  Subjective:   Feeling better,  no SOB, no chest or abd pain, no bowel or bladder issues reported, wants to go home     Objective:  tMax 24 hrs: Temp (24hrs), Av.2 °F (36.8 °C), Min:97.4 °F (36.3 °C), Max:98.8 °F (37.1 °C)      Vitals Ranges:   Temp:  [97.4 °F (36.3 °C)-98.8 °F (37.1 °C)] 97.4 °F (36.3 °C)  Heart Rate:  [] 90  Resp:  [14-20] 18  BP: ()/(60-85) 123/72    Intake and Output Last 3 Shifts:   I/O last 3 completed shifts:  In: 3039.5 [P.O.:790; I.V.:1792; Blood:457.5]  Out: -     Exam:  /72   Pulse 90   Temp 97.4 °F (36.3 °C) (Oral)   Resp 18   Ht 160 cm (63\")   Wt 58 kg (127 lb 13.9 oz)   LMP 2022 (Approximate)   SpO2 94%   BMI 22.65 kg/m²     General Appearance: alert    HEENT:  Normocephalic, without obvious abnormality, Conjunctivae/corneas clear.  Normal external ear canals, nares normal, no drainage     Neck:  Supple, symmetrical, trachea midline. No JVD.  Lungs /Chest wall:   Bilateral basal rhonchi, respirations unlabored, symmetrical wall movement.     Heart:  Regular rate and rhythm, systolic murmur PMI left sternal border  Abdomen: Soft, nontender, no masses, no organomegaly.    Extremities: Trace edema, no clubbing or cyanosis        Medications:    Current Facility-Administered Medications:   •  acetaminophen (TYLENOL) tablet 650 mg, 650 mg, Oral, Q4H PRN, 650 mg at 22 0849 **OR** acetaminophen (TYLENOL) 160 MG/5ML solution 650 mg, 650 mg, Oral, Q4H PRN **OR** acetaminophen (TYLENOL) suppository 650 mg, 650 mg, Rectal, Q4H PRN, Black Moctezuma APRN  •  allopurinol (ZYLOPRIM) tablet 100 mg, 100 mg, Oral, Daily, Black Moctezuma APRN, 100 mg at 22 0856  •  ALPRAZolam (XANAX) tablet 0.25 mg, 0.25 mg, Oral, " Nightly PRN, Black Moctezuma APRN, 0.25 mg at 07/07/22 2107  •  aluminum-magnesium hydroxide-simethicone (MAALOX MAX) 400-400-40 MG/5ML suspension 15 mL, 15 mL, Oral, Q6H PRN, Black Moctezuma APRN  •  arformoterol (BROVANA) nebulizer solution 15 mcg, 15 mcg, Nebulization, BID - RT, Black Moctezuma, APRN, 15 mcg at 07/07/22 2047  •  budesonide (PULMICORT) nebulizer solution 1 mg, 1 mg, Nebulization, BID - RT, Black Moctezuma, APRN, 1 mg at 07/07/22 2047  •  citalopram (CeleXA) tablet 10 mg, 10 mg, Oral, Daily, Black Moctezuma APRN, 10 mg at 07/08/22 0856  •  dextrose (D50W) (25 g/50 mL) IV injection 25 g, 25 g, Intravenous, Q15 Min PRN, Black Moctezuma APRN  •  dextrose (D50W) (25 g/50 mL) IV injection 50 mL, 50 mL, Intravenous, Q1H PRN, Black Moctezuma APRN  •  dextrose (GLUTOSE) oral gel 15 g, 15 g, Oral, Q15 Min PRN, Black Moctezuma APRN  •  diphenhydrAMINE (BENADRYL) injection 25 mg, 25 mg, Intravenous, Q6H PRN, Black Moctezuma APRN, 25 mg at 07/06/22 1157  •  gabapentin (NEURONTIN) capsule 300 mg, 300 mg, Oral, TID, Black Moctezuma APRN, 300 mg at 07/08/22 0856  •  glucagon (human recombinant) (GLUCAGEN DIAGNOSTIC) 1 mg in sterile water (preservative free) 1 mL injection, 1 mg, Intramuscular, Q15 Min PRN, Black Moctezuma APRN  •  guaiFENesin (MUCINEX) 12 hr tablet 600 mg, 600 mg, Oral, Q12H, Black Moctezuma, APRN, 600 mg at 07/08/22 0856  •  guaiFENesin-codeine (GUAIFENESIN AC) 100-10 MG/5ML liquid 5 mL, 5 mL, Oral, Q4H PRN, Black Moctezuma, JP  •  insulin glargine (LANTUS, SEMGLEE) injection 20 Units, 20 Units, Subcutaneous, Daily, Dalton Garcia MD, 20 Units at 07/08/22 0856  •  insulin lispro (ADMELOG) injection 0-12 Units, 0-12 Units, Subcutaneous, TID With Meals, 2 Units at 07/08/22 0856 **AND** [DISCONTINUED] insulin lispro (ADMELOG) injection 0-24 Units, 0-24 Units, Subcutaneous, PRN, Black Moctezuma, JP  •  insulin lispro (ADMELOG) injection 6 Units, 6 Units, Subcutaneous, TID With Meals,  Love, Black E, APRN, 6 Units at 07/08/22 1157  •  melatonin tablet 5 mg, 5 mg, Oral, Nightly PRN, Love, Black E, APRN  •  metoprolol succinate XL (TOPROL-XL) 24 hr tablet 25 mg, 25 mg, Oral, Daily, Love, Black E, APRN, 25 mg at 07/08/22 0856  •  ondansetron (ZOFRAN) tablet 4 mg, 4 mg, Oral, Q6H PRN, 4 mg at 07/05/22 0940 **OR** ondansetron (ZOFRAN) injection 4 mg, 4 mg, Intravenous, Q6H PRN, Love, Black E, APRN  •  oxyCODONE (ROXICODONE) immediate release tablet 10 mg, 10 mg, Oral, Q4H PRN, Love, Black E, APRN, 10 mg at 07/08/22 0900  •  sodium chloride 0.9 % flush 10 mL, 10 mL, Intravenous, PRN, Love, Black E, APRN  •  [COMPLETED] Insert peripheral IV, , , Once **AND** sodium chloride 0.9 % flush 10 mL, 10 mL, Intravenous, PRN, Love, Black E, APRN  •  sodium chloride 0.9 % flush 10 mL, 10 mL, Intravenous, Q12H, Love, Black E, APRN, 10 mL at 07/08/22 0857  •  sodium chloride 0.9 % flush 10 mL, 10 mL, Intravenous, PRN, Love, Black E, APRN  •  sodium chloride 0.9 % infusion, 50 mL/hr, Intravenous, Continuous, Giselle Andre MD, Last Rate: 50 mL/hr at 07/07/22 1109, 50 mL/hr at 07/07/22 1109  •  traZODone (DESYREL) tablet 50 mg, 50 mg, Oral, Nightly, Rhianna, Black E, APRN, 50 mg at 07/07/22 2107    Data Review:  All labs (24hrs):   Recent Results (from the past 24 hour(s))   POC Glucose Once    Collection Time: 07/07/22  4:18 PM    Specimen: Blood   Result Value Ref Range    Glucose 134 (H) 70 - 105 mg/dL   POC Glucose Once    Collection Time: 07/07/22  8:37 PM    Specimen: Blood   Result Value Ref Range    Glucose 179 (H) 70 - 105 mg/dL   Prepare RBC, 1 Units    Collection Time: 07/07/22  9:21 PM   Result Value Ref Range    Product Code W0824C10     Unit Number [id number]     UNIT  ABO A     UNIT  RH POS     Crossmatch Interpretation Compatible     Dispense Status XM     Blood Expiration Date [id number]     Blood Type Barcode 6200    Prepare RBC, 1 Units    Collection Time: 07/08/22  2:00 AM    Result Value Ref Range    Product Code U2898Y40     Unit Number [id number]     UNIT  ABO O     UNIT  RH POS     Crossmatch Interpretation Compatible     Dispense Status PT     Blood Expiration Date [id number]     Blood Type Barcode 5100    Comprehensive Metabolic Panel    Collection Time: 07/08/22  4:50 AM    Specimen: Blood   Result Value Ref Range    Glucose 153 (H) 65 - 99 mg/dL    BUN 13 6 - 20 mg/dL    Creatinine 0.51 (L) 0.57 - 1.00 mg/dL    Sodium 138 136 - 145 mmol/L    Potassium 4.9 3.5 - 5.2 mmol/L    Chloride 104 98 - 107 mmol/L    CO2 25.0 22.0 - 29.0 mmol/L    Calcium 8.5 (L) 8.6 - 10.5 mg/dL    Total Protein 6.7 6.0 - 8.5 g/dL    Albumin 3.20 (L) 3.50 - 5.20 g/dL    ALT (SGPT) 17 1 - 33 U/L    AST (SGOT) 23 1 - 32 U/L    Alkaline Phosphatase 565 (H) 39 - 117 U/L    Total Bilirubin 0.6 0.0 - 1.2 mg/dL    Globulin 3.5 gm/dL    A/G Ratio 0.9 g/dL    BUN/Creatinine Ratio 25.5 (H) 7.0 - 25.0    Anion Gap 9.0 5.0 - 15.0 mmol/L    eGFR 116.8 >60.0 mL/min/1.73   Magnesium    Collection Time: 07/08/22  4:50 AM    Specimen: Blood   Result Value Ref Range    Magnesium 1.9 1.6 - 2.6 mg/dL   Phosphorus    Collection Time: 07/08/22  4:50 AM    Specimen: Blood   Result Value Ref Range    Phosphorus 4.4 2.5 - 4.5 mg/dL   Uric Acid    Collection Time: 07/08/22  4:50 AM    Specimen: Blood   Result Value Ref Range    Uric Acid 4.6 2.4 - 5.7 mg/dL   CBC Auto Differential    Collection Time: 07/08/22  4:50 AM    Specimen: Blood   Result Value Ref Range    WBC 45.30 (C) 3.40 - 10.80 10*3/mm3    RBC 2.83 (L) 3.77 - 5.28 10*6/mm3    Hemoglobin 7.5 (L) 12.0 - 15.9 g/dL    Hematocrit 23.6 (L) 34.0 - 46.6 %    MCV 83.6 79.0 - 97.0 fL    MCH 26.5 (L) 26.6 - 33.0 pg    MCHC 31.6 31.5 - 35.7 g/dL    RDW 20.6 (H) 12.3 - 15.4 %    RDW-SD 58.6 (H) 37.0 - 54.0 fl    MPV 8.2 6.0 - 12.0 fL    Platelets 119 (L) 140 - 450 10*3/mm3   Scan Slide    Collection Time: 07/08/22  4:50 AM    Specimen: Blood   Result Value Ref Range     Scan Slide     Manual Differential    Collection Time: 07/08/22  4:50 AM    Specimen: Blood   Result Value Ref Range    Neutrophil % 59.0 42.7 - 76.0 %    Lymphocyte % 3.0 (L) 19.6 - 45.3 %    Monocyte % 6.0 5.0 - 12.0 %    Eosinophil % 1.0 0.3 - 6.2 %    Basophil % 10.0 (H) 0.0 - 1.5 %    Bands %  5.0 0.0 - 5.0 %    Metamyelocyte % 10.0 (H) 0.0 - 0.0 %    Myelocyte % 3.0 (H) 0.0 - 0.0 %    Blasts % 3.0 (H) 0.0 - 0.0 %    Neutrophils Absolute 28.99 (H) 1.70 - 7.00 10*3/mm3    Lymphocytes Absolute 1.36 0.70 - 3.10 10*3/mm3    Monocytes Absolute 2.72 (H) 0.10 - 0.90 10*3/mm3    Eosinophils Absolute 0.45 (H) 0.00 - 0.40 10*3/mm3    Basophils Absolute 4.53 (H) 0.00 - 0.20 10*3/mm3    Anisocytosis Slight/1+ None Seen    Poikilocytes Slight/1+ None Seen    WBC Morphology Normal Normal    Platelet Estimate Decreased Normal   POC Glucose Once    Collection Time: 07/08/22  7:32 AM    Specimen: Blood   Result Value Ref Range    Glucose 162 (H) 70 - 105 mg/dL   POC Glucose Once    Collection Time: 07/08/22 11:27 AM    Specimen: Blood   Result Value Ref Range    Glucose 118 (H) 70 - 105 mg/dL        Imaging:  XR Chest 1 View  Narrative:    DATE OF EXAM:   7/7/2022 2:33 AM     PROCEDURE:   XR CHEST 1 VW-     INDICATIONS:   Shortness of breath; C92.10-Chronic myeloid leukemia, BCR/ABL-positive,  not having achieved remission; J18.9-Pneumonia, unspecified organism;  I50.9-Heart failure, unspecified     COMPARISON:  07/06/2022     TECHNIQUE:   Portable chest radiograph.     FINDINGS:   The heart is mildly enlarged. There are persistent mixed interstitial  and airspace opacities bilaterally not significantly changed from  07/06/2022 but improved from 06/29/2022. No pneumothorax. No large  pleural effusion. The osseous structures are grossly intact.     Impression: Persistent mixed interstitial and alveolar opacities which may relate to  pulmonary edema, not significantly changed from most recent comparison.     Electronically Signed  By-Zac Turcios MD On:7/7/2022 7:20 AM  This report was finalized on [id number] by  Zac Turcios MD.       ASSESSMENT:  Acute respiratory distress  Lung infiltrate could be due to hematology malignancy, pneumonitis  Hyperkalemia  Blast crisis  CML (chronic myelocytic leukemia)   Depression/Anxiety  Hx PE  DM II  Moderate malnutrition   Acute systolic CHF     Menorrhagia        PLAN:  May discharge from pulmonary standpoint   Watch h/h  Gyn following   Bone marrow biopsy today  Regard the lung infiltrate we will going to monitor for now as long as her oxygenation improving   Bronchodilator  Inhaled corticosteroids  Electrolytes/ glycemic control  DVT and GI prophylaxis    Discussed with Dr. Vishal Royal, APRN   7/8/2022  13:08 EDT     I personally have examined  and interviewed the patient. I have reviewed the history, data, problems, assessment and plan with our NP.  Critical care time in direct medical management (   ) minutes  Electronically signed by Shanelle Rodgers MD. D, ABSM.

## 2022-07-08 NOTE — PROGRESS NOTES
[facility]   PROGRESS NOTE    Reason for follow-up: non-ischemic cardiomyopathy     Patient Care Team:  Houston Oro MD as PCP - General (Family Medicine)  Meghann Lerma MD as Consulting Physician (Hematology and Oncology)    Subjective    Patient feeling better. Still passing clots. Ready to go home. Chart and labs reviewed.        Review of Systems   Constitutional: Negative for diaphoresis and fever.   Cardiovascular: Negative for chest pain, dyspnea on exertion, irregular heartbeat, near-syncope and palpitations.   Respiratory: Negative for cough and shortness of breath.    Gastrointestinal: Negative for nausea and vomiting.   Neurological: Negative for dizziness and light-headedness.   All other systems reviewed and are negative.      Allergies:  Hydromorphone, Morphine, and Propofol    Scheduled Meds:  allopurinol, 100 mg, Oral, Daily  arformoterol, 15 mcg, Nebulization, BID - RT  budesonide, 1 mg, Nebulization, BID - RT  citalopram, 10 mg, Oral, Daily  gabapentin, 300 mg, Oral, TID  guaiFENesin, 600 mg, Oral, Q12H  insulin glargine, 20 Units, Subcutaneous, Daily  insulin lispro, 0-12 Units, Subcutaneous, TID With Meals  insulin lispro, 6 Units, Subcutaneous, TID With Meals  metoprolol succinate XL, 25 mg, Oral, Daily  sodium chloride, 10 mL, Intravenous, Q12H  traZODone, 50 mg, Oral, Nightly      Continuous Infusions:  sodium chloride, 50 mL/hr, Last Rate: 50 mL/hr (07/07/22 1109)      PRN Meds:  •  acetaminophen **OR** acetaminophen **OR** acetaminophen  •  ALPRAZolam  •  aluminum-magnesium hydroxide-simethicone  •  dextrose  •  dextrose  •  dextrose  •  diphenhydrAMINE  •  glucagon (human recombinant)  •  guaiFENesin-codeine  •  melatonin  •  ondansetron **OR** ondansetron  •  oxyCODONE  •  sodium chloride  •  [COMPLETED] Insert peripheral IV **AND** sodium chloride  •  sodium chloride    Objective     VITAL SIGNS  Vitals:    07/08/22 0610 07/08/22  "0750 07/08/22 0855 07/08/22 0900   BP: 116/70  130/78 123/72   BP Location: Left arm  Left arm    Patient Position: Lying  Lying    Pulse: 95 91 98 90   Resp: 14 16  18   Temp: 98 °F (36.7 °C)   97.4 °F (36.3 °C)   TempSrc: Oral   Oral   SpO2: 98% 99% 97% 94%   Weight:       Height:         Flowsheet Rows    Flowsheet Row First Filed Value   Admission Height 162.6 cm (64\") Documented at 06/29/2022 1500   Admission Weight 54.4 kg (120 lb) Documented at 06/29/2022 1500           TELEMETRY: sinus tachycardia    Physical Exam:  Vitals reviewed.   Constitutional:       General: Awake.      Appearance: Normal weight. Frail. Chronically ill-appearing.   Pulmonary:      Effort: Pulmonary effort is normal.      Breath sounds: Normal breath sounds.   Cardiovascular:      Tachycardia present. Regular rhythm. Normal S1. Normal S2.      Murmurs: There is no murmur.   Pulses:     Intact distal pulses.   Edema:     Peripheral edema absent.   Abdominal:      General: Bowel sounds are normal.   Skin:     General: Skin is warm and dry.   Neurological:      General: No focal deficit present.      Mental Status: Alert and oriented to person, place and time.   Psychiatric:         Behavior: Behavior is cooperative.          LAB RESULTS (LAST 7 DAYS)  I have reviewed new clinical results.    CBC  Results from last 7 days   Lab Units 07/08/22  0450 07/07/22  1257 07/07/22  0730 07/07/22  0544 07/06/22  0408 07/05/22  1105 07/04/22  0425 07/03/22  1853 07/02/22  0822   WBC 10*3/mm3 45.30*  --   --  43.20* 43.80* 70.90* 84.50*  --  101.60*   RBC 10*6/mm3 2.83*  --   --  2.53* 2.79* 2.72* 2.86*  --  3.12*   HEMOGLOBIN g/dL 7.5* 7.8* 6.9* 6.4* 7.0* 6.7* 7.2*   < > 7.6*   HEMATOCRIT % 23.6* 24.3* 22.5* 20.8* 22.5* 22.3* 23.2*   < > 25.3*   MCV fL 83.6  --   --  82.2 80.7 81.8 81.1  --  81.1   PLATELETS 10*3/mm3 119*  --   --  125* 145 169 189  --  222    < > = values in this interval not displayed.     CMP   Results from last 7 days   Lab " Units 07/08/22  0450 07/07/22  0544 07/06/22  0408 07/05/22  1646 07/05/22  1105 07/05/22  0908 07/03/22  0946   SODIUM mmol/L 138 137 136 131* 130* 132* 134*   POTASSIUM mmol/L 4.9 5.2 4.9 6.4* 7.1* 5.3* 4.8   CHLORIDE mmol/L 104 104 100 96* 95* 96* 97*   CO2 mmol/L 25.0 25.0 25.0 25.0 23.0 20.0* 24.0   BUN mg/dL 13 22* 45* 60* 61* 59* 28*   CREATININE mg/dL 0.51* 0.56* 0.73 0.98 0.94 0.99 0.56*   GLUCOSE mg/dL 153* 312* 220* 205* 184* 193* 341*   ALBUMIN g/dL 3.20* 3.20* 3.10*  --  3.60 3.90 3.50   BILIRUBIN mg/dL 0.6 0.4 0.3  --  0.3 0.4 0.5   ALK PHOS U/L 565* 476* 527*  --  672* 720* 791*   AST (SGOT) U/L 23 13 7  --  11 14 14   ALT (SGPT) U/L 17 8 6  --  9 9 9     ProBNP      TROPONIN  Results from last 7 days   Lab Units 07/06/22  0408   CK TOTAL U/L 10*     CoAg  Results from last 7 days   Lab Units 07/06/22  0408   INR  1.09   APTT seconds 26.9     Creatinine Clearance  Estimated Creatinine Clearance: 126.2 mL/min (A) (by C-G formula based on SCr of 0.51 mg/dL (L)).    Radiology  XR Chest 1 View    Result Date: 7/7/2022  Persistent mixed interstitial and alveolar opacities which may relate to pulmonary edema, not significantly changed from most recent comparison.  Electronically Signed By-Zac Turcios MD On:7/7/2022 7:20 AM This report was finalized on [id number] by  Zac Turcios MD.      EKG    I personally viewed and interpreted the patient's EKG/Telemetry data:    ECHOCARDIOGRAM:  Results for orders placed during the hospital encounter of 06/29/22    Adult Transthoracic Echo Complete W/ Cont if Necessary Per Protocol    Interpretation Summary  · The left ventricular cavity is mildly dilated.  · Left ventricular wall thickness is consistent with mild concentric hypertrophy.  · Left ventricular ejection fraction appears to be 41 - 45%.  · Left ventricular diastolic function is consistent with (grade Ia w/high LAP) impaired relaxation.      STRESS MYOVIEW:    CARDIAC CATHETERIZATION:    OTHER:        ASSESSMENT/PLAN      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)    CML (chronic myelocytic leukemia) (HCC)    Depression with anxiety    Normocytic hypochromic anemia    History of pulmonary embolism    Type 2 diabetes mellitus with diabetic polyneuropathy, with long-term current use of insulin (HCC)    Moderate malnutrition (HCC)    Acute diastolic CHF (congestive heart failure) (HCC)    Menorrhagia    Tumor lysis syndrome    Acute respiratory failure with hypoxia (HCC)    Thrombocytopenia (HCC)    Hyperuricemia    NICM (nonischemic cardiomyopathy) (HCC)      PLAN  Hemodynamically stable  Would be ok for discharge from cardiac standpoint  Continue current Rx and supportive care    I discussed the patients findings and my recommendations with patient and nurse.  Further recommendations per Dr. Feldman.    JP Marquez  07/08/22  12:17 EDT   Electronically signed by JP Marquez, 07/08/22, 12:25 PM EDT.

## 2022-07-08 NOTE — PROGRESS NOTES
[facility] Medicine Services Daily Progress Note    Patient Name: Nieves Mc  : 1975  MRN: [id number]  Primary Care Physician:  Houston Oro MD  Date of admission: 2022      Subjective      Chief Complaint: None      Patient Reports       22: Wants to go home.  Afebrile.    Review of Systems   All other systems reviewed and are negative.         Objective      Vitals:   Temp:  [97.4 °F (36.3 °C)-98.8 °F (37.1 °C)] 97.4 °F (36.3 °C)  Heart Rate:  [] 90  Resp:  [12-20] 18  BP: ()/(60-85) 123/72  Flow (L/min):  [1-3] 1    Physical Exam  HENT:      Head: Normocephalic.      Nose: Nose normal.   Eyes:      Extraocular Movements: Extraocular movements intact.      Pupils: Pupils are equal, round, and reactive to light.   Cardiovascular:      Rate and Rhythm: Normal rate and regular rhythm.   Pulmonary:      Effort: Pulmonary effort is normal.   Abdominal:      General: Bowel sounds are normal.   Musculoskeletal:         General: Normal range of motion.      Cervical back: Normal range of motion.   Skin:     General: Skin is warm.   Neurological:      Mental Status: She is alert. Mental status is at baseline.   Psychiatric:         Mood and Affect: Mood normal.           Result Review    Result Review:  I have personally reviewed the results from the time of this admission to 2022 10:28 EDT and agree with these findings:  [x]  Laboratory  []  Microbiology  [x]  Radiology  []  EKG/Telemetry   []  Cardiology/Vascular   []  Pathology  [x]  Old records  []  Other:            Assessment & Plan      Brief Patient Summary:        allopurinol, 100 mg, Oral, Daily  arformoterol, 15 mcg, Nebulization, BID - RT  budesonide, 1 mg, Nebulization, BID - RT  citalopram, 10 mg, Oral, Daily  gabapentin, 300 mg, Oral, TID  guaiFENesin, 600 mg, Oral, Q12H  insulin glargine, 20 Units, Subcutaneous, Daily  insulin lispro, 0-12 Units, Subcutaneous, TID With Meals  insulin  lispro, 6 Units, Subcutaneous, TID With Meals  metoprolol succinate XL, 25 mg, Oral, Daily  sodium chloride, 10 mL, Intravenous, Q12H  traZODone, 50 mg, Oral, Nightly       sodium chloride, 50 mL/hr, Last Rate: 50 mL/hr (07/07/22 1109)         Active Hospital Problems:  Active Hospital Problems    Diagnosis    • **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)    • Thrombocytopenia (HCC)    • Tumor lysis syndrome    • Hyperuricemia    • Menorrhagia    • Acute diastolic CHF (congestive heart failure) (HCC)    • Acute respiratory failure with hypoxia (HCC)    • Normocytic hypochromic anemia    • NICM (nonischemic cardiomyopathy) (HCC)    • Moderate malnutrition (HCC)    • Type 2 diabetes mellitus with diabetic polyneuropathy, with long-term current use of insulin (HCC)    • Depression with anxiety    • History of pulmonary embolism    • CML (chronic myelocytic leukemia) (HCC)          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   Tumor lysis syndrome with hyperuricemia   CML (chronic myelocytic leukemia)   -oncology following  -received Rasburicase   -WBC trending down  -on allopurinol  -hydroxyurea on hold   -monitor and trend CBC and uric acid level     Acute diastolic CHF (congestive heart failure) secondary to nonischemic cardiomyopathy  -EF 41-45% per 2D echo  -cardiology following  -monitor I&Os and daily weight   -2 g Na diet, 1800 cc/day fluid restriction   -diuresis on hold  -on BB     Acute respiratory failure with hypoxia secondary to fluid overload  -baseline:  Room air  -currently on O2 @ 2 L per NC  -titrate O2 to keep sat >90%  -on bronchodilators  -pulmonology following      Normocytic hypochromic anemia  -oncology following   -transfused with 2 units PRBC total since admission  -monitor H&H     Thrombocytopenia   -oncology following      Menorrhagia  -GYN consulted  -US unremarkable  -GYN recommended progesterone and outpatient follow up     Depression with anxiety  -on Celexa, trazodone, and  PRN Xanax      History of pulmonary embolism  -Xarelto on hold d/t severe anemia and menorrhagia   -SCDs for VTE prophylaxis      Type 2 diabetes mellitus with diabetic polyneuropathy, with long-term current use of insulin   -A1c 8.4%   -accu checks AC&HS with SSI  -on Lantus and gabapentin   -diabetic consistent carb diet  -endocrinology following      Moderate malnutrition  -dietitian following           DVT prophylaxis:  Mechanical DVT prophylaxis orders are present.    CODE STATUS:    Code Status (Patient has no pulse and is not breathing): CPR (Attempt to Resuscitate)  Medical Interventions (Patient has pulse or is breathing): Full Support      Disposition:  I expect patient to be discharged home.    This patient has been examined wearing appropriate Personal Protective Equipment and discussed with nursing. 07/08/22      Electronically signed by Anthony Herndon DO, 07/08/22, 10:28 EDT.  [facility] Hospitalist Team

## 2022-07-08 NOTE — CASE MANAGEMENT/SOCIAL WORK
Continued Stay Note  ZOHAIB Amor     Patient Name: Nieves Mc  MRN: [id number]  Today's Date: 7/8/2022    Admit Date: 6/29/2022     Discharge Plan     Row Name 07/08/22 1348       Plan    Plan Home with family. Walking oximetry if needed.    Plan Comments Discharge home with family. Patient currently on RA, watch for potential O2 needs, walking oximetry ordered, will need to be completed before discharge. D/C barriers: hem/onc sign off, elevated WBC              Met with patient in room wearing PPE: mask, gloves, gown.      Maintained distance greater than six feet and spent less than 15 minutes in the room.        Sweta Chawla RN

## 2022-07-08 NOTE — PROGRESS NOTES
Daily Progress Note    Patient Care Team:  Houston Oro MD as PCP - General (Family Medicine)  Meghann Lerma MD as Consulting Physician (Hematology and Oncology)    Chief Complaint: Follow-up type 2 diabetes  HPI: Patient seen and examined today.  Blood sugar log reviewed and blood sugars are doing well.  She is eating fair.  No complaints at this time.    ROS:   Constitutional:  Denies fatigue, tiredness.    Respiratory: denies cough, shortness of breath.   Cardiovascular:  denies chest pain, edema   GI:  Denies abdominal pain, nausea, vomiting.         Vitals:    07/08/22 1346   BP: 127/69   Pulse: 105   Resp: 16   Temp: 98.6 °F (37 °C)   SpO2: 100%     Body mass index is 22.65 kg/m².    Physical Exam:  GEN: NAD, conversant  CV: RRR  LUNG: CTA  PSYCH: AOX3, appropriate mood, affect normal      Results Review:     I reviewed the patient's new clinical results.    Glucose   Date Value Ref Range Status   07/08/2022 153 (H) 65 - 99 mg/dL Final     Sodium   Date Value Ref Range Status   07/08/2022 138 136 - 145 mmol/L Final     Potassium   Date Value Ref Range Status   07/08/2022 4.9 3.5 - 5.2 mmol/L Final     Comment:     Slight hemolysis detected by analyzer. Results may be affected.     CO2   Date Value Ref Range Status   07/08/2022 25.0 22.0 - 29.0 mmol/L Final     Chloride   Date Value Ref Range Status   07/08/2022 104 98 - 107 mmol/L Final     Anion Gap   Date Value Ref Range Status   07/08/2022 9.0 5.0 - 15.0 mmol/L Final     Creatinine   Date Value Ref Range Status   07/08/2022 0.51 (L) 0.57 - 1.00 mg/dL Final     BUN   Date Value Ref Range Status   07/08/2022 13 6 - 20 mg/dL Final     BUN/Creatinine Ratio   Date Value Ref Range Status   07/08/2022 25.5 (H) 7.0 - 25.0 Final     Calcium   Date Value Ref Range Status   07/08/2022 8.5 (L) 8.6 - 10.5 mg/dL Final     Alkaline Phosphatase   Date Value Ref Range Status   07/08/2022 565 (H) 39 - 117 U/L Final     Total Protein   Date Value  Ref Range Status   07/08/2022 6.7 6.0 - 8.5 g/dL Final     ALT (SGPT)   Date Value Ref Range Status   07/08/2022 17 1 - 33 U/L Final     AST (SGOT)   Date Value Ref Range Status   07/08/2022 23 1 - 32 U/L Final     Comment:     Slight hemolysis detected by analyzer. Results may be affected.     Total Bilirubin   Date Value Ref Range Status   07/08/2022 0.6 0.0 - 1.2 mg/dL Final     Albumin   Date Value Ref Range Status   07/08/2022 3.20 (L) 3.50 - 5.20 g/dL Final     Globulin   Date Value Ref Range Status   07/08/2022 3.5 gm/dL Final     Magnesium   Date Value Ref Range Status   07/08/2022 1.9 1.6 - 2.6 mg/dL Final     Phosphorus   Date Value Ref Range Status   07/08/2022 4.4 2.5 - 4.5 mg/dL Final     Lab Results   Component Value Date    HGBA1C 8.4 (H) 06/29/2022    HGBA1C 10.2 (H) 06/02/2022    HGBA1C 10.8 (H) 01/13/2022     No results found for: GLUF, MICROALBUR  Results from last 7 days   Lab Units 07/08/22  1127 07/08/22  0732 07/07/22  2037 07/07/22  1618 07/07/22  1134 07/07/22  0729   GLUCOSE mg/dL 118* 162* 179* 134* 137* 220*             Medication Review: Reviewed.     allopurinol, 100 mg, Oral, Daily  arformoterol, 15 mcg, Nebulization, BID - RT  budesonide, 1 mg, Nebulization, BID - RT  citalopram, 10 mg, Oral, Daily  gabapentin, 300 mg, Oral, TID  guaiFENesin, 600 mg, Oral, Q12H  insulin glargine, 20 Units, Subcutaneous, Daily  insulin lispro, 0-12 Units, Subcutaneous, TID With Meals  insulin lispro, 6 Units, Subcutaneous, TID With Meals  losartan, 25 mg, Oral, Q24H  metoprolol succinate XL, 25 mg, Oral, Daily  sodium chloride, 10 mL, Intravenous, Q12H  traZODone, 50 mg, Oral, Nightly              Assessment and plan:  Diabetes mellitus type 2 with hyperglycemia: Blood sugars better, at this time I will continue Lantus to 20 units subcu daily and continue Humalog 6 units with each meal and continue glargine 18 units subcu daily along with Humalog sliding scale.  We will follow blood sugars and make  further adjustments as needed.        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    Dalton Garcia MD. FACE

## 2022-07-08 NOTE — PLAN OF CARE
Goal Outcome Evaluation:     Problem: Adult Inpatient Plan of Care  Goal: Absence of Hospital-Acquired Illness or Injury  Intervention: Prevent Infection  Recent Flowsheet Documentation  Taken 7/7/2022 2000 by Mary Bush RN  Infection Prevention: single patient room provided        Problem: Adult Inpatient Plan of Care  Goal: Absence of Hospital-Acquired Illness or Injury  Intervention: Prevent and Manage VTE (Venous Thromboembolism) Risk  Recent Flowsheet Documentation  Taken 7/7/2022 2000 by Mary Bush RN  Activity Management: up ad alessandro  VTE Prevention/Management:   dorsiflexion/plantar flexion performed   bilateral  Range of Motion: active ROM (range of motion) encouraged

## 2022-07-09 VITALS
OXYGEN SATURATION: 96 % | HEIGHT: 63 IN | HEART RATE: 103 BPM | SYSTOLIC BLOOD PRESSURE: 115 MMHG | TEMPERATURE: 99.1 F | RESPIRATION RATE: 20 BRPM | WEIGHT: 127.87 LBS | DIASTOLIC BLOOD PRESSURE: 58 MMHG | BODY MASS INDEX: 22.66 KG/M2

## 2022-07-09 LAB
ALBUMIN SERPL-MCNC: 3.4 G/DL (ref 3.5–5.2)
ALBUMIN/GLOB SERPL: 1 G/DL
ALP SERPL-CCNC: 552 U/L (ref 39–117)
ALT SERPL W P-5'-P-CCNC: 16 U/L (ref 1–33)
ANION GAP SERPL CALCULATED.3IONS-SCNC: 10 MMOL/L (ref 5–15)
ANISOCYTOSIS BLD QL: ABNORMAL
AST SERPL-CCNC: 19 U/L (ref 1–32)
BASOPHILS # BLD MANUAL: 8.64 10*3/MM3 (ref 0–0.2)
BASOPHILS NFR BLD MANUAL: 18 % (ref 0–1.5)
BH BB BLOOD EXPIRATION DATE: NORMAL
BH BB BLOOD TYPE BARCODE: 6200
BH BB DISPENSE STATUS: NORMAL
BH BB PRODUCT CODE: NORMAL
BH BB UNIT NUMBER: NORMAL
BILIRUB SERPL-MCNC: 0.6 MG/DL (ref 0–1.2)
BLASTS NFR BLD MANUAL: 13 % (ref 0–0)
BUN SERPL-MCNC: 10 MG/DL (ref 6–20)
BUN/CREAT SERPL: 17.9 (ref 7–25)
CALCIUM SPEC-SCNC: 8.4 MG/DL (ref 8.6–10.5)
CHLORIDE SERPL-SCNC: 104 MMOL/L (ref 98–107)
CO2 SERPL-SCNC: 24 MMOL/L (ref 22–29)
CREAT SERPL-MCNC: 0.56 MG/DL (ref 0.57–1)
CROSSMATCH INTERPRETATION: NORMAL
DEPRECATED RDW RBC AUTO: 62.6 FL (ref 37–54)
EGFRCR SERPLBLD CKD-EPI 2021: 114.1 ML/MIN/1.73
EOSINOPHIL # BLD MANUAL: 0.96 10*3/MM3 (ref 0–0.4)
EOSINOPHIL NFR BLD MANUAL: 2 % (ref 0.3–6.2)
ERYTHROCYTE [DISTWIDTH] IN BLOOD BY AUTOMATED COUNT: 21.5 % (ref 12.3–15.4)
GLOBULIN UR ELPH-MCNC: 3.4 GM/DL
GLUCOSE BLDC GLUCOMTR-MCNC: 168 MG/DL (ref 70–105)
GLUCOSE BLDC GLUCOMTR-MCNC: 99 MG/DL (ref 70–105)
GLUCOSE SERPL-MCNC: 185 MG/DL (ref 65–99)
HCT VFR BLD AUTO: 24.1 % (ref 34–46.6)
HGB BLD-MCNC: 7.6 G/DL (ref 12–15.9)
LARGE PLATELETS: ABNORMAL
LYMPHOCYTES # BLD MANUAL: 3.36 10*3/MM3 (ref 0.7–3.1)
LYMPHOCYTES NFR BLD MANUAL: 4 % (ref 5–12)
MAGNESIUM SERPL-MCNC: 1.8 MG/DL (ref 1.6–2.6)
MCH RBC QN AUTO: 26.6 PG (ref 26.6–33)
MCHC RBC AUTO-ENTMCNC: 31.6 G/DL (ref 31.5–35.7)
MCV RBC AUTO: 84.4 FL (ref 79–97)
MONOCYTES # BLD: 1.92 10*3/MM3 (ref 0.1–0.9)
MYELOCYTES NFR BLD MANUAL: 1 % (ref 0–0)
NEUTROPHILS # BLD AUTO: 24.96 10*3/MM3 (ref 1.7–7)
NEUTROPHILS NFR BLD MANUAL: 39 % (ref 42.7–76)
NEUTS BAND NFR BLD MANUAL: 13 % (ref 0–5)
NRBC SPEC MANUAL: 5 /100 WBC (ref 0–0.2)
PHOSPHATE SERPL-MCNC: 4.2 MG/DL (ref 2.5–4.5)
PLATELET # BLD AUTO: 108 10*3/MM3 (ref 140–450)
PMV BLD AUTO: 7.9 FL (ref 6–12)
POLYCHROMASIA BLD QL SMEAR: ABNORMAL
POTASSIUM SERPL-SCNC: 4.2 MMOL/L (ref 3.5–5.2)
PROMYELOCYTES NFR BLD MANUAL: 3 % (ref 0–0)
PROT SERPL-MCNC: 6.8 G/DL (ref 6–8.5)
RBC # BLD AUTO: 2.85 10*6/MM3 (ref 3.77–5.28)
SCAN SLIDE: NORMAL
SODIUM SERPL-SCNC: 138 MMOL/L (ref 136–145)
UNIT  ABO: NORMAL
UNIT  RH: NORMAL
URATE SERPL-MCNC: 5.4 MG/DL (ref 2.4–5.7)
VARIANT LYMPHS NFR BLD MANUAL: 7 % (ref 19.6–45.3)
WBC MORPH BLD: NORMAL
WBC NRBC COR # BLD: 48 10*3/MM3 (ref 3.4–10.8)

## 2022-07-09 PROCEDURE — 94618 PULMONARY STRESS TESTING: CPT

## 2022-07-09 PROCEDURE — 80053 COMPREHEN METABOLIC PANEL: CPT | Performed by: NURSE PRACTITIONER

## 2022-07-09 PROCEDURE — 82962 GLUCOSE BLOOD TEST: CPT

## 2022-07-09 PROCEDURE — 94664 DEMO&/EVAL PT USE INHALER: CPT

## 2022-07-09 PROCEDURE — 84100 ASSAY OF PHOSPHORUS: CPT | Performed by: NURSE PRACTITIONER

## 2022-07-09 PROCEDURE — 99232 SBSQ HOSP IP/OBS MODERATE 35: CPT | Performed by: INTERNAL MEDICINE

## 2022-07-09 PROCEDURE — 85007 BL SMEAR W/DIFF WBC COUNT: CPT | Performed by: NURSE PRACTITIONER

## 2022-07-09 PROCEDURE — 94799 UNLISTED PULMONARY SVC/PX: CPT

## 2022-07-09 PROCEDURE — 85025 COMPLETE CBC W/AUTO DIFF WBC: CPT | Performed by: NURSE PRACTITIONER

## 2022-07-09 PROCEDURE — 83735 ASSAY OF MAGNESIUM: CPT | Performed by: NURSE PRACTITIONER

## 2022-07-09 PROCEDURE — 63710000001 INSULIN GLARGINE PER 5 UNITS: Performed by: INTERNAL MEDICINE

## 2022-07-09 PROCEDURE — 63710000001 INSULIN LISPRO (HUMAN) PER 5 UNITS: Performed by: NURSE PRACTITIONER

## 2022-07-09 PROCEDURE — 94761 N-INVAS EAR/PLS OXIMETRY MLT: CPT

## 2022-07-09 PROCEDURE — 99238 HOSP IP/OBS DSCHRG MGMT 30/<: CPT | Performed by: HOSPITALIST

## 2022-07-09 PROCEDURE — 84550 ASSAY OF BLOOD/URIC ACID: CPT | Performed by: NURSE PRACTITIONER

## 2022-07-09 RX ORDER — INSULIN GLARGINE 100 [IU]/ML
20 INJECTION, SOLUTION SUBCUTANEOUS DAILY
Qty: 10 ML | Refills: 3 | Status: SHIPPED | OUTPATIENT
Start: 2022-07-10

## 2022-07-09 RX ORDER — GUAIFENESIN 600 MG/1
600 TABLET, EXTENDED RELEASE ORAL EVERY 12 HOURS SCHEDULED
Qty: 30 TABLET | Refills: 0 | Status: SHIPPED | OUTPATIENT
Start: 2022-07-09 | End: 2022-11-17 | Stop reason: HOSPADM

## 2022-07-09 RX ORDER — PEN NEEDLE, DIABETIC 30 GX3/16"
1 NEEDLE, DISPOSABLE MISCELLANEOUS
Qty: 200 EACH | Refills: 2 | Status: SHIPPED | OUTPATIENT
Start: 2022-07-09 | End: 2022-07-28

## 2022-07-09 RX ORDER — LOSARTAN POTASSIUM 25 MG/1
25 TABLET ORAL
Qty: 30 TABLET | Refills: 0 | Status: SHIPPED | OUTPATIENT
Start: 2022-07-09 | End: 2023-03-10

## 2022-07-09 RX ORDER — FOLIC ACID 1 MG/1
1 TABLET ORAL DAILY
Qty: 30 TABLET | Refills: 0 | Status: SHIPPED | OUTPATIENT
Start: 2022-07-09 | End: 2023-03-10

## 2022-07-09 RX ORDER — INSULIN LISPRO 100 U/ML
6 INJECTION, SOLUTION SUBCUTANEOUS 3 TIMES DAILY
Qty: 3 ML | Refills: 3 | Status: SHIPPED | OUTPATIENT
Start: 2022-07-09

## 2022-07-09 RX ORDER — HYDROXYUREA 500 MG/1
500 CAPSULE ORAL DAILY
Qty: 60 CAPSULE | Refills: 0 | Status: SHIPPED | OUTPATIENT
Start: 2022-07-10 | End: 2022-08-16 | Stop reason: SDUPTHER

## 2022-07-09 RX ORDER — ALLOPURINOL 100 MG/1
100 TABLET ORAL DAILY
Qty: 30 TABLET | Refills: 0 | Status: ON HOLD | OUTPATIENT
Start: 2022-07-10 | End: 2022-11-07 | Stop reason: SDUPTHER

## 2022-07-09 RX ADMIN — GUAIFENESIN 600 MG: 600 TABLET, EXTENDED RELEASE ORAL at 08:12

## 2022-07-09 RX ADMIN — INSULIN GLARGINE 20 UNITS: 100 INJECTION, SOLUTION SUBCUTANEOUS at 08:15

## 2022-07-09 RX ADMIN — ARFORMOTEROL TARTRATE 15 MCG: 15 SOLUTION RESPIRATORY (INHALATION) at 07:50

## 2022-07-09 RX ADMIN — METOPROLOL SUCCINATE 25 MG: 25 TABLET, FILM COATED, EXTENDED RELEASE ORAL at 08:12

## 2022-07-09 RX ADMIN — CITALOPRAM HYDROBROMIDE 10 MG: 20 TABLET ORAL at 08:12

## 2022-07-09 RX ADMIN — INSULIN LISPRO 6 UNITS: 100 INJECTION, SOLUTION INTRAVENOUS; SUBCUTANEOUS at 08:12

## 2022-07-09 RX ADMIN — GABAPENTIN 300 MG: 300 CAPSULE ORAL at 08:12

## 2022-07-09 RX ADMIN — ALLOPURINOL 100 MG: 100 TABLET ORAL at 08:12

## 2022-07-09 RX ADMIN — Medication 10 ML: at 08:13

## 2022-07-09 RX ADMIN — LOSARTAN POTASSIUM 25 MG: 25 TABLET, FILM COATED ORAL at 08:38

## 2022-07-09 RX ADMIN — HYDROXYUREA 500 MG: 500 CAPSULE ORAL at 08:16

## 2022-07-09 RX ADMIN — BUDESONIDE 1 MG: 0.5 INHALANT RESPIRATORY (INHALATION) at 07:53

## 2022-07-09 RX ADMIN — OXYCODONE 10 MG: 5 TABLET ORAL at 12:42

## 2022-07-09 RX ADMIN — INSULIN LISPRO 6 UNITS: 100 INJECTION, SOLUTION INTRAVENOUS; SUBCUTANEOUS at 12:42

## 2022-07-09 RX ADMIN — OXYCODONE 10 MG: 5 TABLET ORAL at 05:52

## 2022-07-09 RX ADMIN — INSULIN LISPRO 2 UNITS: 100 INJECTION, SOLUTION INTRAVENOUS; SUBCUTANEOUS at 08:11

## 2022-07-09 NOTE — PROGRESS NOTES
Nutrition Services    Patient Name: Nieves Mc  YOB: 1975  MRN: [id number]  Admission date: 6/29/2022    PPE Documentation        PPE Worn By Provider Did not enter room for screening    PPE Worn By Patient  N/A     NUTRITION SCREENING      Encounter Information: Pt screened for re-assessment as not yet D/C according to plan. Pt is eating eat very well, with 100% intake at recent meals, with good tolerance. Current diet continues to meet estimated needs, as tolerated.        PO Diet: Diet Diabetic/Consistent Carbs, Cardiac; 2gm Na+; Diabetic - Consistent Carb; Fluid Restriction; 1800cc/day   PO Supplements: Magic Cup q daily (290 kcals, 9 g protein if consumed)    PO Intake:  100% intake at all recent meals        Labs (reviewed below): Reviewed; management per attending        GI Function:  Stool Output  Stool Unmeasured Occurrence: 1 (07/04/22 0758)  Bowel Incontinence: No (07/04/22 0758)  Stool Amount: moderate (07/04/22 0758)          Skin: No breakdown documented        Weight Hx Review: Stool Output  Stool Unmeasured Occurrence: 1 (07/04/22 0758)  Bowel Incontinence: No (07/04/22 0758)  Stool Amount: moderate (07/04/22 0758)          Nutrition Intervention: Continue current diet, which is meeting needs.       Results from last 7 days   Lab Units 07/08/22  0450 07/07/22  0544 07/06/22  0408   SODIUM mmol/L 138 137 136   POTASSIUM mmol/L 4.9 5.2 4.9   CHLORIDE mmol/L 104 104 100   CO2 mmol/L 25.0 25.0 25.0   BUN mg/dL 13 22* 45*   CREATININE mg/dL 0.51* 0.56* 0.73   CALCIUM mg/dL 8.5* 8.2* 8.5*   BILIRUBIN mg/dL 0.6 0.4 0.3   ALK PHOS U/L 565* 476* 527*   ALT (SGPT) U/L 17 8 6   AST (SGOT) U/L 23 13 7   GLUCOSE mg/dL 153* 312* 220*     Results from last 7 days   Lab Units 07/08/22  1732 07/08/22  0450 07/07/22  0730 07/07/22  0544 07/06/22  0408   MAGNESIUM mg/dL  --  1.9  --  1.9 2.2   PHOSPHORUS mg/dL  --  4.4  --  3.6 5.4*   HEMOGLOBIN g/dL 8.3* 7.5*   < > 6.4* 7.0*   HEMATOCRIT % 25.8*  23.6*   < > 20.8* 22.5*    < > = values in this interval not displayed.     COVID19   Date Value Ref Range Status   06/29/2022 Not Detected Not Detected - Ref. Range Final     Lab Results   Component Value Date    HGBA1C 8.4 (H) 06/29/2022       RD to follow up per protocol.    Electronically signed by:  Mamta Gomez RD  07/08/22 21:55 EDT

## 2022-07-09 NOTE — PROGRESS NOTES
"                                                                                                                                      Nephrology  Progress Note                                        [facility]    Patient Identification    Name: Nieves Mc  Age: 46 y.o.  Sex: female  :  1975  MRN: [id number]      DATE OF SERVICE:  2022        Subective    Patient's without complaint  No shortness of breath       Objective   Scheduled Meds:allopurinol, 100 mg, Oral, Daily  arformoterol, 15 mcg, Nebulization, BID - RT  budesonide, 1 mg, Nebulization, BID - RT  citalopram, 10 mg, Oral, Daily  gabapentin, 300 mg, Oral, TID  guaiFENesin, 600 mg, Oral, Q12H  hydroxyurea, 500 mg, Oral, Daily  insulin glargine, 20 Units, Subcutaneous, Daily  insulin lispro, 0-12 Units, Subcutaneous, TID With Meals  insulin lispro, 6 Units, Subcutaneous, TID With Meals  losartan, 25 mg, Oral, Q24H  metoprolol succinate XL, 25 mg, Oral, Daily  sodium chloride, 10 mL, Intravenous, Q12H  traZODone, 50 mg, Oral, Nightly          Continuous Infusions:sodium chloride, 50 mL/hr, Last Rate: 50 mL/hr (22 1411)        PRN Meds:•  acetaminophen **OR** acetaminophen **OR** acetaminophen  •  ALPRAZolam  •  aluminum-magnesium hydroxide-simethicone  •  dextrose  •  dextrose  •  dextrose  •  diphenhydrAMINE  •  glucagon (human recombinant)  •  guaiFENesin-codeine  •  melatonin  •  ondansetron **OR** ondansetron  •  oxyCODONE  •  sodium chloride  •  [COMPLETED] Insert peripheral IV **AND** sodium chloride  •  sodium chloride     Exam:  /79 (BP Location: Left arm, Patient Position: Sitting)   Pulse 102   Temp 98.9 °F (37.2 °C) (Oral)   Resp 16   Ht 160 cm (63\")   Wt 58 kg (127 lb 13.9 oz)   LMP 2022 (Approximate)   SpO2 99%   BMI 22.65 kg/m²     Intake/Output last 3 shifts:  I/O last 3 completed shifts:  In: 927.5 [P.O.:470; Blood:457.5]  Out: -     Intake/Output this shift:  No intake/output data " recorded.    Physical exam:  General Appearance:  Alert  Head:  Normocephalic, without obvious abnormality, atraumatic  Eyes:  PERRL, conjunctiva/corneas clear     Neck:  Supple,  no adenopathy;      Lungs:  Decreased BS occasion ronchi  Heart:  Regular rate and rhythm, S1 and S2 normal  Abdomen:  Soft, non-tender, bowel sounds active   Extremities: trace edema  Pulses: 2+ and symmetric all extremities  Skin:  No rashes or lesions       Data Review:  All labs (24hrs):   Recent Results (from the past 24 hour(s))   POC Glucose Once    Collection Time: 07/08/22  7:32 AM    Specimen: Blood   Result Value Ref Range    Glucose 162 (H) 70 - 105 mg/dL   POC Glucose Once    Collection Time: 07/08/22 11:27 AM    Specimen: Blood   Result Value Ref Range    Glucose 118 (H) 70 - 105 mg/dL   POC Glucose Once    Collection Time: 07/08/22  4:00 PM    Specimen: Blood   Result Value Ref Range    Glucose 179 (H) 70 - 105 mg/dL   Hemoglobin & Hematocrit, Blood    Collection Time: 07/08/22  5:32 PM    Specimen: Blood   Result Value Ref Range    Hemoglobin 8.3 (L) 12.0 - 15.9 g/dL    Hematocrit 25.8 (L) 34.0 - 46.6 %   POC Glucose Once    Collection Time: 07/08/22  8:40 PM    Specimen: Blood   Result Value Ref Range    Glucose 194 (H) 70 - 105 mg/dL   Comprehensive Metabolic Panel    Collection Time: 07/09/22  5:48 AM    Specimen: Blood   Result Value Ref Range    Glucose 185 (H) 65 - 99 mg/dL    BUN 10 6 - 20 mg/dL    Creatinine 0.56 (L) 0.57 - 1.00 mg/dL    Sodium 138 136 - 145 mmol/L    Potassium 4.2 3.5 - 5.2 mmol/L    Chloride 104 98 - 107 mmol/L    CO2 24.0 22.0 - 29.0 mmol/L    Calcium 8.4 (L) 8.6 - 10.5 mg/dL    Total Protein 6.8 6.0 - 8.5 g/dL    Albumin 3.40 (L) 3.50 - 5.20 g/dL    ALT (SGPT) 16 1 - 33 U/L    AST (SGOT) 19 1 - 32 U/L    Alkaline Phosphatase 552 (H) 39 - 117 U/L    Total Bilirubin 0.6 0.0 - 1.2 mg/dL    Globulin 3.4 gm/dL    A/G Ratio 1.0 g/dL    BUN/Creatinine Ratio 17.9 7.0 - 25.0    Anion Gap 10.0 5.0 - 15.0  mmol/L    eGFR 114.1 >60.0 mL/min/1.73   Magnesium    Collection Time: 07/09/22  5:48 AM    Specimen: Blood   Result Value Ref Range    Magnesium 1.8 1.6 - 2.6 mg/dL   Phosphorus    Collection Time: 07/09/22  5:48 AM    Specimen: Blood   Result Value Ref Range    Phosphorus 4.2 2.5 - 4.5 mg/dL   Uric Acid    Collection Time: 07/09/22  5:48 AM    Specimen: Blood   Result Value Ref Range    Uric Acid 5.4 2.4 - 5.7 mg/dL          Imaging:  US Non-ob Transvaginal    Result Date: 7/4/2022  1.Uterus and left adnexa are unremarkable. 2.2.3 cm right adnexal cyst, likely physiologic.  Electronically Signed By-Amador Larry MD On:7/4/2022 4:43 PM This report was finalized on [id number] by  Amador Larry MD.    US Pelvis Complete    Result Date: 7/4/2022  1.Uterus and left adnexa are unremarkable. 2.2.3 cm right adnexal cyst, likely physiologic.  Electronically Signed By-Amador Larry MD On:7/4/2022 4:43 PM This report was finalized on [id number] by  Amador Larry MD.    XR Chest 1 View    Result Date: 7/7/2022  Persistent mixed interstitial and alveolar opacities which may relate to pulmonary edema, not significantly changed from most recent comparison.  Electronically Signed By-Zac Turcios MD On:7/7/2022 7:20 AM This report was finalized on [id number] by  Zac Turcios MD.    XR Chest 1 View    Result Date: 7/6/2022  Cardiomegaly with no evidence of pulmonary edema. Atelectasis in the lung bases due to low lung volumes  Electronically Signed By-Dom Corral On:7/6/2022 8:09 AM This report was finalized on [id number] by  Dom Corral, .    XR Chest 1 View    Result Date: 7/3/2022  1.Hazy bilateral airspace opacities, which could reflect pulmonary edema or pneumonia. 2.Cardiomegaly.  Electronically Signed By-Amador Larry MD On:7/3/2022 6:09 PM This report was finalized on [id number] by  Amador Laryr MD.    CT Bone marrow biopsy and aspiration    Result Date: 7/6/2022  Technically  successful CT-guided left posterior iliac 11-gauge bone marrow aspiration and core biopsy.  Electronically Signed By-Mary Corado MD On:7/6/2022 3:27 PM This report was finalized on [id number] by  Mary Corado MD.      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)    CML (chronic myelocytic leukemia) (HCC)    Depression with anxiety    Normocytic hypochromic anemia    History of pulmonary embolism    Type 2 diabetes mellitus with diabetic polyneuropathy, with long-term current use of insulin (HCC)    Moderate malnutrition (HCC)    Acute diastolic CHF (congestive heart failure) (HCC)    Menorrhagia    Tumor lysis syndrome    Acute respiratory failure with hypoxia (HCC)    Thrombocytopenia (HCC)    Hyperuricemia    NICM (nonischemic cardiomyopathy) (HCC)              Acute hyperkalemia  Hyponatremia  Hyperuricemia  Hyperphosphatemia  Possible tumor lysis syndrome  Leukocytosis  Anemia  Thrombocytopenia       Kidney function remains stable  Potassium  Is better 4.9  Uric acid is better  Reduce IV fluids

## 2022-07-09 NOTE — PROGRESS NOTES
Hematology/Oncology Inpatient Progress Note    PATIENT NAME: Nieves Mc  : 1975  MRN: [id number]    CHIEF COMPLAINT: Dyspnea and cough    HISTORY OF PRESENT ILLNESS:      Nieves Mc is a 46 y.o. female who presented to [facility] on 2022 with complaints of worsening shortness of breath, cough,, chest pain for a week.  CT scan shows continued severe infiltrates unchanged from last CT scan.  Patient was also found to be in blast crisis with her history of CML and blasts of 27% on CBC.  Patient was admitted for further evaluation and treatment.     22  Hematology/Oncology was consulted for established patient with CML presenting with 27% blasts on CBC.  Patient is currently on Tasigna twice daily and Hydrea with history of noncompliance with medications and recent office visit with WBC of 200,000 as well as Hgb of 7.7 due to uncontrolled CML.  Patient was continued on Tasigna and Hydrea at office visit.  After presenting yesterday with blast crisis at 27%, her CBC today shows decreased blasts at 17%, and WBC of 205,000.    2022 -.6, hemoglobin 7.6, platelet 222.     2022: No major changes in her symptoms. She had clear evidence of tumor lysis syndrome. In spite of the institution of allopurinol, her uric acid had increased further. She had hyperkalemia and hyperphosphatemia. Rasburicase was prescribed. She was seen by Nephrology.     2022: Much better had been transferred out of intensive care.      Subjective   Continues to need supplemental oxygen, no other new symptoms.       MEDICATIONS:    Scheduled Meds:  allopurinol, 100 mg, Oral, Daily  arformoterol, 15 mcg, Nebulization, BID - RT  budesonide, 1 mg, Nebulization, BID - RT  citalopram, 10 mg, Oral, Daily  gabapentin, 300 mg, Oral, TID  guaiFENesin, 600 mg, Oral, Q12H  hydroxyurea, 500 mg, Oral, Daily  insulin glargine, 20 Units, Subcutaneous, Daily  insulin lispro, 0-12 Units, Subcutaneous, TID With  "Meals  insulin lispro, 6 Units, Subcutaneous, TID With Meals  losartan, 25 mg, Oral, Q24H  metoprolol succinate XL, 25 mg, Oral, Daily  sodium chloride, 10 mL, Intravenous, Q12H  traZODone, 50 mg, Oral, Nightly       Continuous Infusions:      PRN Meds:  •  acetaminophen **OR** acetaminophen **OR** acetaminophen  •  ALPRAZolam  •  aluminum-magnesium hydroxide-simethicone  •  dextrose  •  dextrose  •  dextrose  •  diphenhydrAMINE  •  glucagon (human recombinant)  •  guaiFENesin-codeine  •  melatonin  •  ondansetron **OR** ondansetron  •  oxyCODONE  •  sodium chloride  •  [COMPLETED] Insert peripheral IV **AND** sodium chloride  •  sodium chloride     ALLERGIES:    Allergies   Allergen Reactions   • Hydromorphone GI Intolerance     dilaudid   • Morphine Nausea And Vomiting   • Propofol Hives     Previously tolerated being premedicated with diphenhydramine and famotadine     Objective    VITALS:   /79   Pulse 104   Temp 98.9 °F (37.2 °C) (Oral)   Resp 18   Ht 160 cm (63\")   Wt 58 kg (127 lb 13.9 oz)   LMP 05/25/2022 (Approximate)   SpO2 96%   BMI 22.65 kg/m²     PHYSICAL EXAM: (performed by MD)  Physical Exam  Constitutional:       Appearance: Normal appearance.   HENT:      Head: Normocephalic and atraumatic.   Eyes:      Pupils: Pupils are equal, round, and reactive to light.   Cardiovascular:      Rate and Rhythm: Normal rate and regular rhythm.      Pulses: Normal pulses.      Heart sounds: No murmur heard.  Pulmonary:      Effort: Pulmonary effort is normal.      Breath sounds: Rhonchi present.   Abdominal:      General: There is distension.      Palpations: Abdomen is soft. There is no mass.      Tenderness: There is no abdominal tenderness.   Musculoskeletal:         General: Normal range of motion.      Cervical back: Normal range of motion.   Skin:     General: Skin is warm.   Neurological:      General: No focal deficit present.      Mental Status: She is alert.   Psychiatric:         Mood and " Affect: Mood normal.         RECENT LABS:  Lab Results (last 24 hours)     Procedure Component Value Units Date/Time    Manual Differential [[id number]]  (Abnormal) Collected: 07/09/22 0548    Specimen: Blood Updated: 07/09/22 0707     Neutrophil % 39.0 %      Lymphocyte % 7.0 %      Monocyte % 4.0 %      Eosinophil % 2.0 %      Basophil % 18.0 %      Bands %  13.0 %      Myelocyte % 1.0 %      Promyelocyte % 3.0 %      Blasts % 13.0 %      Neutrophils Absolute 24.96 10*3/mm3      Lymphocytes Absolute 3.36 10*3/mm3      Monocytes Absolute 1.92 10*3/mm3      Eosinophils Absolute 0.96 10*3/mm3      Basophils Absolute 8.64 10*3/mm3      nRBC 5.0 /100 WBC      Anisocytosis Mod/2+     Polychromasia Slight/1+     WBC Morphology Normal     Large Platelets Slight/1+    Narrative:      Reviewed by Pathologist within the past 30 days on 07.06.2022.      CBC & Differential [[id number]]  (Abnormal) Collected: 07/09/22 0548    Specimen: Blood Updated: 07/09/22 0707    Narrative:      The following orders were created for panel order CBC & Differential.  Procedure                               Abnormality         Status                     ---------                               -----------         ------                     CBC Auto Differential[[id number]]        Abnormal            Final result               Scan Slide[[id number]]                                       Final result                 Please view results for these tests on the individual orders.    Scan Slide [[id number]] Collected: 07/09/22 0548    Specimen: Blood Updated: 07/09/22 0707     Scan Slide --     Comment: See Manual Differential Results       CBC Auto Differential [[id number]]  (Abnormal) Collected: 07/09/22 0548    Specimen: Blood Updated: 07/09/22 0707     WBC 48.00 10*3/mm3      RBC 2.85 10*6/mm3      Hemoglobin 7.6 g/dL      Hematocrit 24.1 %      MCV 84.4 fL      MCH 26.6 pg      MCHC 31.6 g/dL      RDW 21.5 %      RDW-SD 62.6 fl      MPV 7.9 fL       Platelets 108 10*3/mm3     Narrative:      The previously reported component NRBC is no longer being reported. Previous result was 0.7 /100 WBC (Reference Range: 0.0-0.2 /100 WBC) on 7/9/2022 at 0656 EDT.    POC Glucose Once [[id number]]  (Abnormal) Collected: 07/09/22 0652    Specimen: Blood Updated: 07/09/22 0653     Glucose 168 mg/dL      Comment: Serial Number: [id number]:  [id number]       Comprehensive Metabolic Panel [[id number]]  (Abnormal) Collected: 07/09/22 0548    Specimen: Blood Updated: 07/09/22 0621     Glucose 185 mg/dL      BUN 10 mg/dL      Creatinine 0.56 mg/dL      Sodium 138 mmol/L      Potassium 4.2 mmol/L      Chloride 104 mmol/L      CO2 24.0 mmol/L      Calcium 8.4 mg/dL      Total Protein 6.8 g/dL      Albumin 3.40 g/dL      ALT (SGPT) 16 U/L      AST (SGOT) 19 U/L      Alkaline Phosphatase 552 U/L      Total Bilirubin 0.6 mg/dL      Globulin 3.4 gm/dL      A/G Ratio 1.0 g/dL      BUN/Creatinine Ratio 17.9     Anion Gap 10.0 mmol/L      eGFR 114.1 mL/min/1.73      Comment: National Kidney Foundation and American Society of Nephrology (ASN) Task Force recommended calculation based on the Chronic Kidney Disease Epidemiology Collaboration (CKD-EPI) equation refit without adjustment for race.       Narrative:      GFR Normal >60  Chronic Kidney Disease <60  Kidney Failure <15      Uric Acid [[id number]]  (Normal) Collected: 07/09/22 0548    Specimen: Blood Updated: 07/09/22 0621     Uric Acid 5.4 mg/dL     Phosphorus [[id number]]  (Normal) Collected: 07/09/22 0548    Specimen: Blood Updated: 07/09/22 0621     Phosphorus 4.2 mg/dL     Magnesium [[id number]]  (Normal) Collected: 07/09/22 0548    Specimen: Blood Updated: 07/09/22 0621     Magnesium 1.8 mg/dL     POC Glucose Once [[id number]]  (Abnormal) Collected: 07/08/22 2040    Specimen: Blood Updated: 07/08/22 2044     Glucose 194 mg/dL      Comment: Serial Number: [id number]:  [id number]       Hemoglobin & Hematocrit, Blood [[id number]]   (Abnormal) Collected: 07/08/22 1732    Specimen: Blood Updated: 07/08/22 1738     Hemoglobin 8.3 g/dL      Hematocrit 25.8 %     POC Glucose Once [[id number]]  (Abnormal) Collected: 07/08/22 1600    Specimen: Blood Updated: 07/08/22 1602     Glucose 179 mg/dL      Comment: Serial Number: [id number]:  [id number]       POC Glucose Once [[id number]]  (Abnormal) Collected: 07/08/22 1127    Specimen: Blood Updated: 07/08/22 1133     Glucose 118 mg/dL      Comment: Serial Number: [id number]:  [id number]             IMAGING REVIEWED:  No radiology results for the last day    Assessment & Plan   ASSESSMENT:  1. Tumor lysis syndrome. Resolved. Uric acid continues to be normal at 5.4  2. Chronic myelogenous leukemia. The final report of pathology is not yet out. The report of flow cytometry describes 8% myeloid blasts, which confirms chronic phase. Her treatment at this point could be with any of the tyrosine kinase inhibitors available. However her absolute lack of compliance will make the treatment very difficult. Continue Hydrea at 500 mg daily for now and monitor WBC, stable at 48 today.  3. Lung disease.       PLAN:  1. As above.

## 2022-07-09 NOTE — PROGRESS NOTES
Exercise Oximetry    Patient Name:Nieves Mc   MRN: [id number]   Date: 07/09/22             ROOM AIR BASELINE   SpO2% 97 on room air sitting in bed   Heart Rate    Blood Pressure      EXERCISE ON ROOM AIR SpO2% EXERCISE ON O2 @  LPM SpO2%   1 MINUTE  99 1 MINUTE    2 MINUTES  100 2 MINUTES    3 MINUTES  100 3 MINUTES    4 MINUTES  99 4 MINUTES    5 MINUTES  100 5 MINUTES    6 MINUTES  100 6 MINUTES               Distance Walked   Distance Walked   Dyspnea ([place] Scale)   Dyspnea ([place] Scale)   Fatigue ([place] Scale)   Fatigue ([place] Scale)   SpO2% Post Exercise   SpO2% Post Exercise   HR Post Exercise   HR Post Exercise   Time to Recovery   Time to Recovery     Comments: pt was 97 on room air at rest pt stood et walked in her room at bedside without incident, by the end of the study pt says she is exhausted

## 2022-07-09 NOTE — PLAN OF CARE
Goal Outcome Evaluation:  Plan of Care Reviewed With: patient        Progress: no change  Outcome Evaluation: Patient received am medications. Patient is on room air.

## 2022-07-09 NOTE — PROGRESS NOTES
[facility] Daily Progress Note    Patient Name: Nieves Mc  : 1975  MRN: [id number]  Primary Care Physician:  Houston Oro MD  Date of admission: 2022      Subjective      Chief Complaint: None      Patient Reports       22: Wants to go home.  Afebrile.  22: discharge home.     Review of Systems   All other systems reviewed and are negative.         Objective      Vitals:   Temp:  [98 °F (36.7 °C)-98.9 °F (37.2 °C)] 98.9 °F (37.2 °C)  Heart Rate:  [] 104  Resp:  [16-20] 18  BP: (120-142)/(68-79) 132/79    Physical Exam  HENT:      Head: Normocephalic.      Nose: Nose normal.   Eyes:      Extraocular Movements: Extraocular movements intact.      Pupils: Pupils are equal, round, and reactive to light.   Cardiovascular:      Rate and Rhythm: Normal rate and regular rhythm.   Pulmonary:      Effort: Pulmonary effort is normal.   Abdominal:      General: Bowel sounds are normal.   Musculoskeletal:         General: Normal range of motion.      Cervical back: Normal range of motion.   Skin:     General: Skin is warm.   Neurological:      Mental Status: She is alert. Mental status is at baseline.   Psychiatric:         Mood and Affect: Mood normal.           Result Review    Result Review:  I have personally reviewed the results from the time of this admission to 2022 14:20 EDT and agree with these findings:  [x]  Laboratory  []  Microbiology  [x]  Radiology  []  EKG/Telemetry   []  Cardiology/Vascular   []  Pathology  [x]  Old records  []  Other:            Assessment & Plan      Brief Patient Summary:        allopurinol, 100 mg, Oral, Daily  arformoterol, 15 mcg, Nebulization, BID - RT  budesonide, 1 mg, Nebulization, BID - RT  citalopram, 10 mg, Oral, Daily  gabapentin, 300 mg, Oral, TID  guaiFENesin, 600 mg, Oral, Q12H  hydroxyurea, 500 mg, Oral, Daily  insulin glargine, 20 Units, Subcutaneous, Daily  insulin lispro, 0-12 Units,  Subcutaneous, TID With Meals  insulin lispro, 6 Units, Subcutaneous, TID With Meals  losartan, 25 mg, Oral, Q24H  metoprolol succinate XL, 25 mg, Oral, Daily  sodium chloride, 10 mL, Intravenous, Q12H  traZODone, 50 mg, Oral, Nightly             Active Hospital Problems:  Active Hospital Problems    Diagnosis    • **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)    • Thrombocytopenia (HCC)    • Tumor lysis syndrome    • Hyperuricemia    • Menorrhagia    • Acute diastolic CHF (congestive heart failure) (HCC)    • Acute respiratory failure with hypoxia (HCC)    • Normocytic hypochromic anemia    • NICM (nonischemic cardiomyopathy) (HCC)    • Moderate malnutrition (HCC)    • Type 2 diabetes mellitus with diabetic polyneuropathy, with long-term current use of insulin (HCC)    • Depression with anxiety    • History of pulmonary embolism    • CML (chronic myelocytic leukemia) (HCC)          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   Tumor lysis syndrome with hyperuricemia   CML (chronic myelocytic leukemia)   -oncology following  -received Rasburicase   -WBC trending down  -on allopurinol  -hydroxyurea on hold   -monitor and trend CBC and uric acid level     Acute diastolic CHF (congestive heart failure) secondary to nonischemic cardiomyopathy  -EF 41-45% per 2D echo  -cardiology following  -monitor I&Os and daily weight   -2 g Na diet, 1800 cc/day fluid restriction   -diuresis on hold  -on BB     Acute respiratory failure with hypoxia secondary to fluid overload  -baseline:  Room air  -currently on O2 @ 2 L per NC  -titrate O2 to keep sat >90%  -on bronchodilators  -pulmonology following      Normocytic hypochromic anemia  -oncology following   -transfused with 2 units PRBC total since admission  -monitor H&H     Thrombocytopenia   -oncology following      Menorrhagia  -GYN consulted  -US unremarkable  -GYN recommended progesterone and outpatient follow up     Depression with anxiety  -on Celexa, trazodone,  and PRN Xanax      History of pulmonary embolism  -Xarelto on hold d/t severe anemia and menorrhagia   -SCDs for VTE prophylaxis      Type 2 diabetes mellitus with diabetic polyneuropathy, with long-term current use of insulin   -A1c 8.4%   -accu checks AC&HS with SSI  -on Lantus and gabapentin   -diabetic consistent carb diet  -endocrinology following      Moderate malnutrition  -dietitian following           DVT prophylaxis:  Mechanical DVT prophylaxis orders are present.    CODE STATUS:    Code Status (Patient has no pulse and is not breathing): CPR (Attempt to Resuscitate)  Medical Interventions (Patient has pulse or is breathing): Full Support      Disposition:  I expect patient to be discharged home.    This patient has been examined wearing appropriate Personal Protective Equipment and discussed with nursing. 07/09/22      Electronically signed by Anthony Herndon DO, 07/09/22, 14:20 EDT.  [facility], [place], operated by [facility] Hospitalist Team

## 2022-07-09 NOTE — PLAN OF CARE
Goal Outcome Evaluation:   Doing well on RA,pain well controlled.Iv fluids continued.  Problem: Pain Acute  Goal: Acceptable Pain Control and Functional Ability  Intervention: Prevent or Manage Pain  Recent Flowsheet Documentation  Taken 7/8/2022 2000 by Mary Bush RN  Medication Review/Management: medications reviewed     Problem: Pain Acute  Goal: Acceptable Pain Control and Functional Ability  Intervention: Optimize Psychosocial Wellbeing  Recent Flowsheet Documentation  Taken 7/8/2022 2000 by Mary Bush RN  Diversional Activities:   smartphone   television

## 2022-07-09 NOTE — PROGRESS NOTES
"PULMONARY CRITICAL CARE Progress  NOTE      PATIENT IDENTIFICATION:  Name: Nieves Mc  MRN: [id number]  :  1975     Age: 46 y.o.  Sex: female    DATE OF Note:  2022   Referring Physician: Shanelle Rodgers MD                  Subjective:   Feeling better, sitting up in bed, states she is still having vaginal bleeding and passing large clots,  no SOB, no chest or abd pain, no bowel or bladder issues       Objective:  tMax 24 hrs: Temp (24hrs), Av.5 °F (36.9 °C), Min:98 °F (36.7 °C), Max:98.9 °F (37.2 °C)      Vitals Ranges:   Temp:  [98 °F (36.7 °C)-98.9 °F (37.2 °C)] 98.9 °F (37.2 °C)  Heart Rate:  [] 104  Resp:  [16-20] 18  BP: (120-142)/(68-79) 132/79    Intake and Output Last 3 Shifts:   I/O last 3 completed shifts:  In: 470 [P.O.:470]  Out: -     Exam:  /79   Pulse 104   Temp 98.9 °F (37.2 °C) (Oral)   Resp 18   Ht 160 cm (63\")   Wt 58 kg (127 lb 13.9 oz)   LMP 2022 (Approximate)   SpO2 96%   BMI 22.65 kg/m²     General Appearance: AAO     HEENT:  Normocephalic, without obvious abnormality, Conjunctivae/corneas clear.  Normal external ear canals, nares normal, no drainage     Neck:  Supple, symmetrical, trachea midline. No JVD.  Lungs /Chest wall:  Lungs clearing, respirations unlabored, symmetrical wall movement.     Heart:  Regular rate and rhythm, systolic murmur PMI left sternal border  Abdomen: Soft, nontender, no masses, no organomegaly.    Extremities: No edema, clubbing or cyanosis        Medications:    Current Facility-Administered Medications:   •  acetaminophen (TYLENOL) tablet 650 mg, 650 mg, Oral, Q4H PRN, 650 mg at 22 0849 **OR** acetaminophen (TYLENOL) 160 MG/5ML solution 650 mg, 650 mg, Oral, Q4H PRN **OR** acetaminophen (TYLENOL) suppository 650 mg, 650 mg, Rectal, Q4H PRN, Black Moctezuma APRN  •  allopurinol (ZYLOPRIM) tablet 100 mg, 100 mg, Oral, Daily, Black Moctezuma APRN, 100 mg at 22 0812  •  ALPRAZolam (XANAX) tablet 0.25 mg, " 0.25 mg, Oral, Nightly PRN, Black Moctezuma, APRN, 0.25 mg at 07/08/22 2035  •  aluminum-magnesium hydroxide-simethicone (MAALOX MAX) 400-400-40 MG/5ML suspension 15 mL, 15 mL, Oral, Q6H PRN, Black Moctezuma, APRN  •  arformoterol (BROVANA) nebulizer solution 15 mcg, 15 mcg, Nebulization, BID - RT, Black Moctezuma, APRN, 15 mcg at 07/09/22 0750  •  budesonide (PULMICORT) nebulizer solution 1 mg, 1 mg, Nebulization, BID - RT, Black Moctezuma, APRN, 1 mg at 07/09/22 0753  •  citalopram (CeleXA) tablet 10 mg, 10 mg, Oral, Daily, Black Moctezuma, APRN, 10 mg at 07/09/22 0812  •  dextrose (D50W) (25 g/50 mL) IV injection 25 g, 25 g, Intravenous, Q15 Min PRN, Black Moctezuma APRN  •  dextrose (D50W) (25 g/50 mL) IV injection 50 mL, 50 mL, Intravenous, Q1H PRN, Black Moctezuma APRN  •  dextrose (GLUTOSE) oral gel 15 g, 15 g, Oral, Q15 Min PRN, Black Moctezuma, APRN  •  diphenhydrAMINE (BENADRYL) injection 25 mg, 25 mg, Intravenous, Q6H PRN, Black Moctezuma APRN, 25 mg at 07/06/22 1157  •  gabapentin (NEURONTIN) capsule 300 mg, 300 mg, Oral, TID, Black Moctezuma APRN, 300 mg at 07/09/22 0812  •  glucagon (human recombinant) (GLUCAGEN DIAGNOSTIC) 1 mg in sterile water (preservative free) 1 mL injection, 1 mg, Intramuscular, Q15 Min PRN, Black Moctezuma APRN  •  guaiFENesin (MUCINEX) 12 hr tablet 600 mg, 600 mg, Oral, Q12H, Black Moctezuma, APRN, 600 mg at 07/09/22 0812  •  guaiFENesin-codeine (GUAIFENESIN AC) 100-10 MG/5ML liquid 5 mL, 5 mL, Oral, Q4H PRN, Black Moctezuma APRN  •  hydroxyurea (HYDREA) capsule 500 mg, 500 mg, Oral, Daily, Yifan Truong MD, 500 mg at 07/09/22 0816  •  insulin glargine (LANTUS, SEMGLEE) injection 20 Units, 20 Units, Subcutaneous, Daily, Dalton Garcia MD, 20 Units at 07/09/22 0815  •  insulin lispro (ADMELOG) injection 0-12 Units, 0-12 Units, Subcutaneous, TID With Meals, 2 Units at 07/09/22 0811 **AND** [DISCONTINUED] insulin lispro (ADMELOG) injection 0-24 Units, 0-24 Units,  Subcutaneous, PRN, Love, Black E, APRN  •  insulin lispro (ADMELOG) injection 6 Units, 6 Units, Subcutaneous, TID With Meals, Rhianna, Black E, APRN, 6 Units at 07/09/22 0812  •  losartan (COZAAR) tablet 25 mg, 25 mg, Oral, Q24H, Sindhu Goldberg, APRN, 25 mg at 07/09/22 0838  •  melatonin tablet 5 mg, 5 mg, Oral, Nightly PRN, Love, Black E, APRN  •  metoprolol succinate XL (TOPROL-XL) 24 hr tablet 25 mg, 25 mg, Oral, Daily, Love, Black E, APRN, 25 mg at 07/09/22 0812  •  ondansetron (ZOFRAN) tablet 4 mg, 4 mg, Oral, Q6H PRN, 4 mg at 07/05/22 0940 **OR** ondansetron (ZOFRAN) injection 4 mg, 4 mg, Intravenous, Q6H PRN, Love, Black E, APRN  •  oxyCODONE (ROXICODONE) immediate release tablet 10 mg, 10 mg, Oral, Q4H PRN, Love, Black E, APRN, 10 mg at 07/09/22 0552  •  sodium chloride 0.9 % flush 10 mL, 10 mL, Intravenous, PRN, Love, Black E, APRN  •  [COMPLETED] Insert peripheral IV, , , Once **AND** sodium chloride 0.9 % flush 10 mL, 10 mL, Intravenous, PRN, Love, Black E, APRN  •  sodium chloride 0.9 % flush 10 mL, 10 mL, Intravenous, Q12H, Love, Black E, APRN, 10 mL at 07/09/22 0813  •  sodium chloride 0.9 % flush 10 mL, 10 mL, Intravenous, PRN, Love, Black E, APRN  •  traZODone (DESYREL) tablet 50 mg, 50 mg, Oral, Nightly, Love, Black E, APRN, 50 mg at 07/08/22 2035    Data Review:  All labs (24hrs):   Recent Results (from the past 24 hour(s))   POC Glucose Once    Collection Time: 07/08/22 11:27 AM    Specimen: Blood   Result Value Ref Range    Glucose 118 (H) 70 - 105 mg/dL   POC Glucose Once    Collection Time: 07/08/22  4:00 PM    Specimen: Blood   Result Value Ref Range    Glucose 179 (H) 70 - 105 mg/dL   Hemoglobin & Hematocrit, Blood    Collection Time: 07/08/22  5:32 PM    Specimen: Blood   Result Value Ref Range    Hemoglobin 8.3 (L) 12.0 - 15.9 g/dL    Hematocrit 25.8 (L) 34.0 - 46.6 %   POC Glucose Once    Collection Time: 07/08/22  8:40 PM    Specimen: Blood   Result Value Ref Range     Glucose 194 (H) 70 - 105 mg/dL   Comprehensive Metabolic Panel    Collection Time: 07/09/22  5:48 AM    Specimen: Blood   Result Value Ref Range    Glucose 185 (H) 65 - 99 mg/dL    BUN 10 6 - 20 mg/dL    Creatinine 0.56 (L) 0.57 - 1.00 mg/dL    Sodium 138 136 - 145 mmol/L    Potassium 4.2 3.5 - 5.2 mmol/L    Chloride 104 98 - 107 mmol/L    CO2 24.0 22.0 - 29.0 mmol/L    Calcium 8.4 (L) 8.6 - 10.5 mg/dL    Total Protein 6.8 6.0 - 8.5 g/dL    Albumin 3.40 (L) 3.50 - 5.20 g/dL    ALT (SGPT) 16 1 - 33 U/L    AST (SGOT) 19 1 - 32 U/L    Alkaline Phosphatase 552 (H) 39 - 117 U/L    Total Bilirubin 0.6 0.0 - 1.2 mg/dL    Globulin 3.4 gm/dL    A/G Ratio 1.0 g/dL    BUN/Creatinine Ratio 17.9 7.0 - 25.0    Anion Gap 10.0 5.0 - 15.0 mmol/L    eGFR 114.1 >60.0 mL/min/1.73   Magnesium    Collection Time: 07/09/22  5:48 AM    Specimen: Blood   Result Value Ref Range    Magnesium 1.8 1.6 - 2.6 mg/dL   Phosphorus    Collection Time: 07/09/22  5:48 AM    Specimen: Blood   Result Value Ref Range    Phosphorus 4.2 2.5 - 4.5 mg/dL   Uric Acid    Collection Time: 07/09/22  5:48 AM    Specimen: Blood   Result Value Ref Range    Uric Acid 5.4 2.4 - 5.7 mg/dL   CBC Auto Differential    Collection Time: 07/09/22  5:48 AM    Specimen: Blood   Result Value Ref Range    WBC 48.00 (C) 3.40 - 10.80 10*3/mm3    RBC 2.85 (L) 3.77 - 5.28 10*6/mm3    Hemoglobin 7.6 (L) 12.0 - 15.9 g/dL    Hematocrit 24.1 (L) 34.0 - 46.6 %    MCV 84.4 79.0 - 97.0 fL    MCH 26.6 26.6 - 33.0 pg    MCHC 31.6 31.5 - 35.7 g/dL    RDW 21.5 (H) 12.3 - 15.4 %    RDW-SD 62.6 (H) 37.0 - 54.0 fl    MPV 7.9 6.0 - 12.0 fL    Platelets 108 (L) 140 - 450 10*3/mm3   Scan Slide    Collection Time: 07/09/22  5:48 AM    Specimen: Blood   Result Value Ref Range    Scan Slide     Manual Differential    Collection Time: 07/09/22  5:48 AM    Specimen: Blood   Result Value Ref Range    Neutrophil % 39.0 (L) 42.7 - 76.0 %    Lymphocyte % 7.0 (L) 19.6 - 45.3 %    Monocyte % 4.0 (L) 5.0 - 12.0  %    Eosinophil % 2.0 0.3 - 6.2 %    Basophil % 18.0 (H) 0.0 - 1.5 %    Bands %  13.0 (H) 0.0 - 5.0 %    Myelocyte % 1.0 (H) 0.0 - 0.0 %    Promyelocyte % 3.0 (H) 0.0 - 0.0 %    Blasts % 13.0 (H) 0.0 - 0.0 %    Neutrophils Absolute 24.96 (H) 1.70 - 7.00 10*3/mm3    Lymphocytes Absolute 3.36 (H) 0.70 - 3.10 10*3/mm3    Monocytes Absolute 1.92 (H) 0.10 - 0.90 10*3/mm3    Eosinophils Absolute 0.96 (H) 0.00 - 0.40 10*3/mm3    Basophils Absolute 8.64 (H) 0.00 - 0.20 10*3/mm3    nRBC 5.0 (H) 0.0 - 0.2 /100 WBC    Anisocytosis Mod/2+ None Seen    Polychromasia Slight/1+ None Seen    WBC Morphology Normal Normal    Large Platelets Slight/1+ None Seen   POC Glucose Once    Collection Time: 07/09/22  6:52 AM    Specimen: Blood   Result Value Ref Range    Glucose 168 (H) 70 - 105 mg/dL        Imaging:  XR Chest 1 View  Narrative:    DATE OF EXAM:   7/7/2022 2:33 AM     PROCEDURE:   XR CHEST 1 VW-     INDICATIONS:   Shortness of breath; C92.10-Chronic myeloid leukemia, BCR/ABL-positive,  not having achieved remission; J18.9-Pneumonia, unspecified organism;  I50.9-Heart failure, unspecified     COMPARISON:  07/06/2022     TECHNIQUE:   Portable chest radiograph.     FINDINGS:   The heart is mildly enlarged. There are persistent mixed interstitial  and airspace opacities bilaterally not significantly changed from  07/06/2022 but improved from 06/29/2022. No pneumothorax. No large  pleural effusion. The osseous structures are grossly intact.     Impression: Persistent mixed interstitial and alveolar opacities which may relate to  pulmonary edema, not significantly changed from most recent comparison.     Electronically Signed By-Zac Turcios MD On:7/7/2022 7:20 AM  This report was finalized on [id number] by  Zac Turcios MD.       ASSESSMENT:  Acute respiratory distress  Lung infiltrate could be due to hematology malignancy, pneumonitis  Hyperkalemia  Blast crisis  CML (chronic myelocytic leukemia)   Depression/Anxiety  Hx  PE  DM II  Moderate malnutrition   Acute systolic CHF     Menorrhagia        PLAN:  OOB daily   Watch H & H  Gyn following   Regard the lung infiltrate we will going to monitor for now as long as her oxygenation improving   Bronchodilator  Inhaled corticosteroids  Electrolytes/ glycemic control  DVT and GI prophylaxis     Discussed with Dr. Vishal Royal, APRN   7/9/2022  10:07 EDT     I personally have examined  and interviewed the patient. I have reviewed the history, data, problems, assessment and plan with our NP.  Critical care time in direct medical management (   ) minutes  Electronically signed by Shanelle Rodgers MD. D, ABSM.

## 2022-07-09 NOTE — PROGRESS NOTES
Daily Progress Note    Patient Care Team:  Houston Oro MD as PCP - General (Family Medicine)  Meghann Lerma MD as Consulting Physician (Hematology and Oncology)    Chief Complaint: Follow-up type 2 diabetes  HPI: Patient seen and examined today.  Blood sugar log reviewed and blood sugars are doing well.  She is eating fair.  No complaints at this time.     ROS:   Constitutional:  Denies fatigue, tiredness.    Respiratory: denies cough, shortness of breath.   Cardiovascular:  denies chest pain, edema   GI:  Denies abdominal pain, nausea, vomiting.          Vitals:    07/09/22 0812   BP: 132/79   Pulse: 104   Resp:    Temp:    SpO2:      Body mass index is 22.65 kg/m².    Physical Exam:  GEN: NAD, conversant  CV: RRR  LUNG: CTA  PSYCH: AOX3, appropriate mood, affect normal      Results Review:     I reviewed the patient's new clinical results.    Glucose   Date Value Ref Range Status   07/09/2022 185 (H) 65 - 99 mg/dL Final     Sodium   Date Value Ref Range Status   07/09/2022 138 136 - 145 mmol/L Final     Potassium   Date Value Ref Range Status   07/09/2022 4.2 3.5 - 5.2 mmol/L Final     CO2   Date Value Ref Range Status   07/09/2022 24.0 22.0 - 29.0 mmol/L Final     Chloride   Date Value Ref Range Status   07/09/2022 104 98 - 107 mmol/L Final     Anion Gap   Date Value Ref Range Status   07/09/2022 10.0 5.0 - 15.0 mmol/L Final     Creatinine   Date Value Ref Range Status   07/09/2022 0.56 (L) 0.57 - 1.00 mg/dL Final     BUN   Date Value Ref Range Status   07/09/2022 10 6 - 20 mg/dL Final     BUN/Creatinine Ratio   Date Value Ref Range Status   07/09/2022 17.9 7.0 - 25.0 Final     Calcium   Date Value Ref Range Status   07/09/2022 8.4 (L) 8.6 - 10.5 mg/dL Final     Alkaline Phosphatase   Date Value Ref Range Status   07/09/2022 552 (H) 39 - 117 U/L Final     Total Protein   Date Value Ref Range Status   07/09/2022 6.8 6.0 - 8.5 g/dL Final     ALT (SGPT)   Date Value Ref Range Status    07/09/2022 16 1 - 33 U/L Final     AST (SGOT)   Date Value Ref Range Status   07/09/2022 19 1 - 32 U/L Final     Total Bilirubin   Date Value Ref Range Status   07/09/2022 0.6 0.0 - 1.2 mg/dL Final     Albumin   Date Value Ref Range Status   07/09/2022 3.40 (L) 3.50 - 5.20 g/dL Final     Globulin   Date Value Ref Range Status   07/09/2022 3.4 gm/dL Final     Magnesium   Date Value Ref Range Status   07/09/2022 1.8 1.6 - 2.6 mg/dL Final     Phosphorus   Date Value Ref Range Status   07/09/2022 4.2 2.5 - 4.5 mg/dL Final     Lab Results   Component Value Date    HGBA1C 8.4 (H) 06/29/2022    HGBA1C 10.2 (H) 06/02/2022    HGBA1C 10.8 (H) 01/13/2022     No results found for: GLUF, MICROALBUR  Results from last 7 days   Lab Units 07/09/22  0652 07/08/22  2040 07/08/22  1600 07/08/22  1127 07/08/22  0732 07/07/22 2037   GLUCOSE mg/dL 168* 194* 179* 118* 162* 179*             Medication Review: Reviewed.     allopurinol, 100 mg, Oral, Daily  arformoterol, 15 mcg, Nebulization, BID - RT  budesonide, 1 mg, Nebulization, BID - RT  citalopram, 10 mg, Oral, Daily  gabapentin, 300 mg, Oral, TID  guaiFENesin, 600 mg, Oral, Q12H  hydroxyurea, 500 mg, Oral, Daily  insulin glargine, 20 Units, Subcutaneous, Daily  insulin lispro, 0-12 Units, Subcutaneous, TID With Meals  insulin lispro, 6 Units, Subcutaneous, TID With Meals  losartan, 25 mg, Oral, Q24H  metoprolol succinate XL, 25 mg, Oral, Daily  sodium chloride, 10 mL, Intravenous, Q12H  traZODone, 50 mg, Oral, Nightly              Assessment and plan:  Diabetes mellitus type 2 with hyperglycemia: Blood sugars better, at this time I will continue Lantus to 20 units subcu daily and continue Humalog 6 units with each meal and continue glargine 18 units subcu daily along with Humalog sliding scale.  We will follow blood sugars and make further adjustments as needed.        Cardiomyopathy: Followed by cardiology     Chronic myelogenous leukemia: Followed by oncology     Tumor lysis  syndrome:  Followed by oncology.    Dalton Garcia MD. FACE

## 2022-07-10 ENCOUNTER — READMISSION MANAGEMENT (OUTPATIENT)
Dept: CALL CENTER | Facility: HOSPITAL | Age: 47
End: 2022-07-10

## 2022-07-10 NOTE — OUTREACH NOTE
Prep Survey    Flowsheet Row Responses   [facility] discharged from? [facility]   Is LACE score < 7 ? No   Emergency Room discharge w/ pulse ox? No   Eligibility Readm Mgmt   Discharge diagnosis chronic myeloid leukemia in blast crisis, Acute respiratory failure with hypoxemia, Acute diastolic CHF    Does the patient have one of the following disease processes/diagnoses(primary or secondary)? CHF   Does the patient have Home health ordered? No   Is there a DME ordered? No   Prep survey completed? Yes          SIERRA DARBY - Registered Nurse

## 2022-07-10 NOTE — DISCHARGE SUMMARY
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) [C92.10]  Pneumonia due to infectious organism, unspecified laterality, unspecified part of lung [J18.9]  Congestive heart failure, unspecified HF chronicity, unspecified heart failure type ([facility]) [I50.9]    Active and Resolved Hospital Problems:  Active Hospital Problems    Diagnosis POA   • **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) [C92.10] Yes   • Thrombocytopenia ([facility]) [D69.6] No   • Tumor lysis syndrome [E88.3] No   • Hyperuricemia [E79.0] No   • Menorrhagia [N92.0] Yes   • Acute diastolic CHF (congestive heart failure) ([facility]) [I50.31] Yes   • Acute respiratory failure with hypoxia ([facility]) [J96.01] Yes   • Normocytic hypochromic anemia [D50.9] Yes   • NICM (nonischemic cardiomyopathy) ([facility]) [I42.8] Yes   • Moderate malnutrition ([facility]) [E44.0] Yes   • Type 2 diabetes mellitus with diabetic polyneuropathy, with long-term current use of insulin ([facility]) [E11.42, Z79.4] Not Applicable   • Depression with anxiety [F41.8] Yes   • History of pulmonary embolism [Z86.711] Yes   • CML (chronic myelocytic leukemia) (HCC) [C92.10] Yes      Resolved Hospital Problems    Diagnosis POA   • Hyperkalemia [E87.5] No   • Hyperphosphatemia [E83.39] No         Hospital Course     Hospital Course:      The patient is a 46-year-old female with CML, chronic pain syndrome, IDDM, depression, anxiety and PE on Xarelto.  The patient has history of medication noncompliance.    The patient came to the emergency room on 2022 complaining of chest pain and shortness of breath.  She was recently hospitalized between 2022 and 6/10/2022 for acute  hypoxemic respiratory failure due to TRALI and CML.    In the ED, the patient was noted to be an CHF exacerbation and blast crisis therefore cardiology and hematology/oncology were consulted.    The patient was initially admitted to the PCU.  She was noted to have tumor lysis syndrome on 7/5/2022 thus was transferred to the ICU.  Nephrology was consulted to manage tumor lysis syndrome.  She has been evaluated by oncology regarding menorrhagia.  She has undergone pelvic ultrasound and transvaginal ultrasound on 7/4/2022.  Xarelto was held due to blood loss anemia that required 2 units of PRBC transfusion.    The hospital service was consulted on 7/7/2022 for medical management after she was downgraded.  She underwent home oxygen eval on 7/9/2022 and did not require supplemental oxygen.  She will be discharged home on Hydrea and will follow-up with hematology oncology in the office.  Xarelto was restarted on discharge home.          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   Tumor lysis syndrome with hyperuricemia   CML (chronic myelocytic leukemia)   -Reason for transfer to the ICU  -oncology and nephrology consulted during the hospitalization      Acute diastolic CHF (congestive heart failure) secondary to nonischemic cardiomyopathy  -EF 41-45% per 2D echo  -cardiology consulted during the hospitalization       Acute respiratory failure with hypoxia secondary to fluid overload: Resolved  -Did not qualify for home oxygen on the day of discharge  -Pulmonology consulted during the hospitalization       Normocytic hypochromic anemia  -oncology following   -transfused with 2 units PRBC   -Restart anticoagulation with Xarelto on discharge home     Thrombocytopenia   -oncology following      Blood loss anemia due to menorrhagia  -GYN consulted  -s/p pelvic ultrasound  -GYN recommended progesterone and outpatient follow up     Depression with anxiety:  Controlled  -on Celexa, trazodone, and PRN Xanax      History of pulmonary embolism  -Xarelto on hold d/t severe anemia and menorrhagia during the hospitalization and restarted on discharge home     Type 2 diabetes mellitus with diabetic polyneuropathy, with long-term current use of insulin   -A1c 8.4%   -Continue home glargine and lispro     Moderate malnutrition  -dietitian consulted during the hospitalization           DISCHARGE Follow Up Recommendations for labs and diagnostics:       Reasons For Change In Medications and Indications for New Medications:      Day of Discharge     Vital Signs:       Physical Exam:    HENT:      Head: Normocephalic.      Nose: Nose normal.   Eyes:      Extraocular Movements: Extraocular movements intact.      Pupils: Pupils are equal, round, and reactive to light.   Cardiovascular:      Rate and Rhythm: Normal rate and regular rhythm.   Pulmonary:      Effort: Pulmonary effort is normal.   Abdominal:      General: Bowel sounds are normal.   Musculoskeletal:         General: Normal range of motion.      Cervical back: Normal range of motion.   Skin:     General: Skin is warm.   Neurological:      Mental Status: She is alert. Mental status is at baseline.   Psychiatric:         Mood and Affect: Mood normal.       Pertinent  and/or Most Recent Results     LAB RESULTS:      Lab 07/09/22  0548 07/08/22  1732 07/08/22  0450 07/07/22  1257 07/07/22  0730 07/07/22  0544 07/06/22  0408 07/05/22  1105   WBC 48.00*  --  45.30*  --   --  43.20* 43.80* 70.90*   HEMOGLOBIN 7.6* 8.3* 7.5* 7.8* 6.9* 6.4* 7.0* 6.7*   HEMATOCRIT 24.1* 25.8* 23.6* 24.3* 22.5* 20.8* 22.5* 22.3*   PLATELETS 108*  --  119*  --   --  125* 145 169   NEUTROS ABS 24.96*  --  28.99*  --   --  19.44* 15.77* 39.70*   EOS ABS 0.96*  --  0.45*  --   --  1.30* 1.31* 2.84*   MCV 84.4  --  83.6  --   --  82.2 80.7 81.8   PROTIME  --   --   --   --   --   --  11.2  --    APTT  --   --   --   --   --   --  26.9  --          Lab  07/09/22  0548 07/08/22  0450 07/07/22  0544 07/06/22  0408 07/05/22  1646 07/05/22  1105 07/05/22  0908   SODIUM 138 138 137 136 131*   < > 132*   POTASSIUM 4.2 4.9 5.2 4.9 6.4*   < > 5.3*   CHLORIDE 104 104 104 100 96*   < > 96*   CO2 24.0 25.0 25.0 25.0 25.0   < > 20.0*   ANION GAP 10.0 9.0 8.0 11.0 10.0   < > 16.0*   BUN 10 13 22* 45* 60*   < > 59*   CREATININE 0.56* 0.51* 0.56* 0.73 0.98   < > 0.99   EGFR 114.1 116.8 114.1 102.9 72.2   < > 71.4   GLUCOSE 185* 153* 312* 220* 205*   < > 193*   CALCIUM 8.4* 8.5* 8.2* 8.5* 8.9   < > 8.9   MAGNESIUM 1.8 1.9 1.9 2.2  --   --   --    PHOSPHORUS 4.2 4.4 3.6 5.4*  --   --  7.1*    < > = values in this interval not displayed.         Lab 07/09/22  0548 07/08/22  0450 07/07/22  0544 07/06/22  0408 07/05/22  1105   TOTAL PROTEIN 6.8 6.7 6.4 6.5 7.1   ALBUMIN 3.40* 3.20* 3.20* 3.10* 3.60   GLOBULIN 3.4 3.5 3.2 3.4 3.5   ALT (SGPT) 16 17 8 6 9   AST (SGOT) 19 23 13 7 11   BILIRUBIN 0.6 0.6 0.4 0.3 0.3   ALK PHOS 552* 565* 476* 527* 672*         Lab 07/06/22  0408 07/05/22  0908   PROBNP  --  858.8*   PROTIME 11.2  --    INR 1.09  --              Lab 07/06/22  0408 07/05/22  1307   IRON 90  --    IRON SATURATION 25  --    TIBC 359  --    TRANSFERRIN 241  --    ABO TYPING  --  A   RH TYPING  --  Positive   ANTIBODY SCREEN  --  Positive         Brief Urine Lab Results  (Last result in the past 365 days)      Color   Clarity   Blood   Leuk Est   Nitrite   Protein   CREAT   Urine HCG        07/05/22 1620 Yellow   Clear   Large (3+)   Trace   Negative   Negative               Microbiology Results (last 10 days)     ** No results found for the last 240 hours. **          CT Head Without Contrast    Result Date: 6/29/2022  Impression: CT head is normal  Electronically Signed By-Torito Oneal MD On:6/29/2022 6:43 PM This report was finalized on [id number] by  Torito Oneal MD.    US Non-ob Transvaginal    Result Date: 7/4/2022  Impression: 1.Uterus and left adnexa  are unremarkable. 2.2.3 cm right adnexal cyst, likely physiologic.  Electronically Signed By-Amador Larry MD On:7/4/2022 4:43 PM This report was finalized on [id number] by  Amador Larry MD.    US Pelvis Complete    Result Date: 7/4/2022  Impression: 1.Uterus and left adnexa are unremarkable. 2.2.3 cm right adnexal cyst, likely physiologic.  Electronically Signed By-Amador Larry MD On:7/4/2022 4:43 PM This report was finalized on [id number] by  Amador Larry MD.    XR Chest 1 View    Result Date: 7/7/2022  Impression: Persistent mixed interstitial and alveolar opacities which may relate to pulmonary edema, not significantly changed from most recent comparison.  Electronically Signed By-Zac Turcios MD On:7/7/2022 7:20 AM This report was finalized on [id number] by  Zac Turcios MD.    XR Chest 1 View    Result Date: 7/6/2022  Impression: Cardiomegaly with no evidence of pulmonary edema. Atelectasis in the lung bases due to low lung volumes  Electronically Signed By-Dom Corral On:7/6/2022 8:09 AM This report was finalized on [id number] by  Dom Corral, .    XR Chest 1 View    Result Date: 7/3/2022  Impression: 1.Hazy bilateral airspace opacities, which could reflect pulmonary edema or pneumonia. 2.Cardiomegaly.  Electronically Signed By-Amador Larry MD On:7/3/2022 6:09 PM This report was finalized on [id number] by  Amador Larry MD.    XR Chest 1 View    Result Date: 6/29/2022  Impression: IMPRESSION : Cardiomegaly and moderate congestive failure, pulmonary edema. Asymmetric opacity at the right lung base may represent asymmetric edema, atelectasis or pneumonia[  Electronically Signed By-Freddy Toribio On:6/29/2022 4:02 PM This report was finalized on [id number] by  Freddy Toribio, .    CT Angiogram Chest Pulmonary Embolism    Result Date: 6/29/2022  Impression:  1. No evidence of thrombus or embolus in the right or left pulmonary artery branches. 2. Bilateral  diffuse symmetric severe infiltrates unchanged significantly since the previous study.  Electronically Signed By-Torito Oneal MD On:6/29/2022 7:07 PM This report was finalized on [id number] by  Torito Oneal MD.    CT Bone marrow biopsy and aspiration    Result Date: 7/6/2022  Impression: Technically successful CT-guided left posterior iliac 11-gauge bone marrow aspiration and core biopsy.  Electronically Signed By-Mary Corado MD On:7/6/2022 3:27 PM This report was finalized on [id number] by  Mary Corado MD.    XR Hip With or Without Pelvis 1 View Left    Result Date: 6/30/2022  Impression: Unremarkable left hip and pelvis  Electronically Signed By-Dom Corral On:6/30/2022 10:18 AM This report was finalized on [id number] by  Dom Corral, .      Results for orders placed during the hospital encounter of 03/11/21    Duplex Venous Lower Extremity - Left    Interpretation Summary  · Normal left lower extremity venous duplex scan.      Results for orders placed during the hospital encounter of 03/11/21    Duplex Venous Lower Extremity - Left    Interpretation Summary  · Normal left lower extremity venous duplex scan.      Results for orders placed during the hospital encounter of 06/29/22    Adult Transthoracic Echo Complete W/ Cont if Necessary Per Protocol    Interpretation Summary  · The left ventricular cavity is mildly dilated.  · Left ventricular wall thickness is consistent with mild concentric hypertrophy.  · Left ventricular ejection fraction appears to be 41 - 45%.  · Left ventricular diastolic function is consistent with (grade Ia w/high LAP) impaired relaxation.      Labs Pending at Discharge:  Pending Labs     Order Current Status    Bone Marrow Exam Collected (07/06/22 1109)    Bone Marrow Exam Collected (07/06/22 1109)    Bone Marrow Aspiration & Exam In process    Chromosome Analysis, Bone Marrow In process    Tissue Pathology Exam In process          Procedures  Performed           Consults:   Consults     Date and Time Order Name Status Description    7/3/2022  6:03 PM Inpatient Pulmonology Consult Completed     6/5/2022  8:26 AM Inpatient Pulmonology Consult Completed             Discharge Details        Discharge Medications      New Medications      Instructions Start Date   allopurinol 100 MG tablet  Commonly known as: ZYLOPRIM   100 mg, Oral, Daily      BASAGLAR KWIKPEN 100 UNIT/ML injection pen   20 Units, Subcutaneous, Daily      folic acid 1 MG tablet  Commonly known as: FOLVITE   1 mg, Oral, Daily      Insulin Lispro 100 UNIT/ML injection pen  Commonly known as: ADMELOG SOLOSTAR  Replaces: Insulin Lispro (1 Unit Dial) 100 UNIT/ML solution pen-injector   6 Units, Subcutaneous, 3 Times Daily      losartan 25 MG tablet  Commonly known as: COZAAR   25 mg, Oral, Every 24 Hours Scheduled      Mucus Relief 600 MG 12 hr tablet  Generic drug: guaiFENesin   600 mg, Oral, Every 12 Hours Scheduled      Unifine Pentips 32G X 4 MM misc  Generic drug: Insulin Pen Needle   Use with insulins (Four) Times a Day Before Meals & at Bedtime.         Changes to Medications      Instructions Start Date   hydroxyurea 500 MG capsule  Commonly known as: HYDREA  What changed:   · how much to take  · when to take this   500 mg, Oral, Daily         Continue These Medications      Instructions Start Date   ALPRAZolam 0.25 MG tablet  Commonly known as: Xanax   0.25 mg, Oral, Nightly PRN      citalopram 10 MG tablet  Commonly known as: CeleXA   10 mg, Oral, Daily, To begin 1/31/22      gabapentin 300 MG capsule  Commonly known as: NEURONTIN   300 mg, Oral, 3 Times Daily      metoprolol succinate XL 25 MG 24 hr tablet  Commonly known as: TOPROL-XL   25 mg, Oral, Daily      nilotinib 150 MG capsule capsule  Commonly known as: TASIGNA   150 mg, Oral, 2 Times Daily Before Meals      oxyCODONE-acetaminophen  MG per tablet  Commonly known as: PERCOCET   1 tablet, Oral, Every 8 Hours PRN       rivaroxaban 20 MG tablet  Commonly known as: XARELTO   20 mg, Oral, Daily      traZODone 50 MG tablet  Commonly known as: DESYREL   50 mg, Oral, Nightly         Stop These Medications    Insulin Glargine-Lixisenatide 100-33 UNT-MCG/ML solution pen-injector injection  Commonly known as: SOLIQUA     Insulin Lispro (1 Unit Dial) 100 UNIT/ML solution pen-injector  Commonly known as: HUMALOG  Replaced by: Insulin Lispro 100 UNIT/ML injection pen            Allergies   Allergen Reactions   • Hydromorphone GI Intolerance     dilaudid   • Morphine Nausea And Vomiting   • Propofol Hives     Previously tolerated being premedicated with diphenhydramine and famotadine         Discharge Disposition:   Home or Self Care    Diet:  Hospital:No active diet order        Discharge Activity:  As tolerated      Discharge Condition: stable      CODE STATUS:  Code Status and Medical Interventions:   Ordered at: 06/30/22 0003     Code Status (Patient has no pulse and is not breathing):    CPR (Attempt to Resuscitate)     Medical Interventions (Patient has pulse or is breathing):    Full Support         No future appointments.    Additional Instructions for the Follow-ups that You Need to Schedule     Discharge Follow-up with PCP   As directed       Currently Documented PCP:    Houston Oro MD    PCP Phone Number:    [phone]     Follow Up Details: 2 weeks               Time spent on Discharge including face to face service:  20 minutes    This patient has been examined wearing appropriate Personal Protective Equipment and discussed with nursing. 07/10/22      Signature: Electronically signed by Anthony Herndon DO, 07/10/22, 7:10 PM EDT.

## 2022-07-11 NOTE — CASE MANAGEMENT/SOCIAL WORK
Case Management Discharge Note      Final Note: Home        Transportation Services  Private: Car    Final Discharge Disposition Code: 01 - home or self-care

## 2022-07-12 ENCOUNTER — SPECIALTY PHARMACY (OUTPATIENT)
Dept: PHARMACY | Facility: HOSPITAL | Age: 47
End: 2022-07-12

## 2022-07-12 LAB — QT INTERVAL: 348 MS

## 2022-07-12 NOTE — PROGRESS NOTES
Specialty Pharmacy Note     Patient had hydroxyurea filled and dispense on 7/9/22 while being discharged from hospital. It was a bed side delivery. The were a note commenting that was consulted on meds that were given.    Follow-up: 1 month(s)     Jyotsna Anton, Pharmacy Technician  Specialty Pharmacy Technician

## 2022-07-13 ENCOUNTER — READMISSION MANAGEMENT (OUTPATIENT)
Dept: CALL CENTER | Facility: HOSPITAL | Age: 47
End: 2022-07-13

## 2022-07-13 NOTE — OUTREACH NOTE
CHF Week 1 Survey    Flowsheet Row Responses   [facility] facility patient discharged from? [facility]   Does the patient have one of the following disease processes/diagnoses(primary or secondary)? CHF   CHF Week 1 attempt successful? No   Unsuccessful attempts Attempt 1          RADHA DOLAN - Registered Nurse

## 2022-07-14 LAB
LAB AP CASE REPORT: NORMAL
LAB AP DIAGNOSIS COMMENT: NORMAL
LAB AP FLOW CYTOMETRY SUMMARY: NORMAL
LAB AP INTEGRATED GENETICS, ADDENDUM: NORMAL
PATH REPORT.FINAL DX SPEC: NORMAL
PATH REPORT.GROSS SPEC: NORMAL

## 2022-07-15 LAB — KARYOTYP MAR: NORMAL

## 2022-07-16 DIAGNOSIS — C95.90 LEUKEMIA NOT HAVING ACHIEVED REMISSION, UNSPECIFIED LEUKEMIA TYPE: ICD-10-CM

## 2022-07-18 LAB
MYCOBACTERIUM SPEC CULT: NORMAL
NIGHT BLUE STAIN TISS: NORMAL

## 2022-07-18 RX ORDER — CITALOPRAM 10 MG/1
10 TABLET ORAL DAILY
Qty: 30 TABLET | Refills: 1 | Status: SHIPPED | OUTPATIENT
Start: 2022-07-18 | End: 2022-09-01 | Stop reason: SDUPTHER

## 2022-07-22 DIAGNOSIS — G90.522 COMPLEX REGIONAL PAIN SYNDROME TYPE 1 OF LEFT LOWER EXTREMITY: ICD-10-CM

## 2022-07-22 RX ORDER — OXYCODONE AND ACETAMINOPHEN 10; 325 MG/1; MG/1
1 TABLET ORAL EVERY 8 HOURS PRN
Qty: 90 TABLET | Refills: 0 | Status: SHIPPED | OUTPATIENT
Start: 2022-07-22 | End: 2022-07-27 | Stop reason: SDUPTHER

## 2022-07-22 NOTE — TELEPHONE ENCOUNTER
Rx Refill Note  Requested Prescriptions     Pending Prescriptions Disp Refills   • oxyCODONE-acetaminophen (PERCOCET)  MG per tablet 90 tablet 0     Sig: Take 1 tablet by mouth Every 8 (Eight) Hours As Needed for Moderate Pain .      Last office visit with prescribing clinician: 5/9/2022      Next office visit with prescribing clinician: 7/27/2022            Valerie Valderrama MA  07/22/22, 11:06 EDT

## 2022-07-25 ENCOUNTER — READMISSION MANAGEMENT (OUTPATIENT)
Dept: CALL CENTER | Facility: HOSPITAL | Age: 47
End: 2022-07-25

## 2022-07-25 NOTE — OUTREACH NOTE
CHF Week 3 Survey    Flowsheet Row Responses   [facility] facility patient discharged from? [facility]   Does the patient have one of the following disease processes/diagnoses(primary or secondary)? Other   Week 3 attempt successful? No   Unsuccessful attempts Attempt 1          SIDRA AMAYA - Registered Nurse

## 2022-07-27 ENCOUNTER — APPOINTMENT (OUTPATIENT)
Dept: GENERAL RADIOLOGY | Facility: HOSPITAL | Age: 47
End: 2022-07-27

## 2022-07-27 ENCOUNTER — OFFICE VISIT (OUTPATIENT)
Dept: PAIN MEDICINE | Facility: CLINIC | Age: 47
End: 2022-07-27

## 2022-07-27 ENCOUNTER — HOSPITAL ENCOUNTER (INPATIENT)
Facility: HOSPITAL | Age: 47
LOS: 8 days | Discharge: HOME | End: 2022-08-05
Attending: EMERGENCY MEDICINE | Admitting: INTERNAL MEDICINE

## 2022-07-27 ENCOUNTER — APPOINTMENT (OUTPATIENT)
Dept: CT IMAGING | Facility: HOSPITAL | Age: 47
End: 2022-07-27

## 2022-07-27 VITALS
WEIGHT: 127 LBS | SYSTOLIC BLOOD PRESSURE: 151 MMHG | RESPIRATION RATE: 16 BRPM | BODY MASS INDEX: 22.5 KG/M2 | OXYGEN SATURATION: 95 % | HEART RATE: 116 BPM | DIASTOLIC BLOOD PRESSURE: 93 MMHG

## 2022-07-27 DIAGNOSIS — R07.9 CHEST PAIN, UNSPECIFIED TYPE: ICD-10-CM

## 2022-07-27 DIAGNOSIS — I50.9 ACUTE CONGESTIVE HEART FAILURE, UNSPECIFIED HEART FAILURE TYPE: ICD-10-CM

## 2022-07-27 DIAGNOSIS — J18.9 PNEUMONIA DUE TO INFECTIOUS ORGANISM, UNSPECIFIED LATERALITY, UNSPECIFIED PART OF LUNG: ICD-10-CM

## 2022-07-27 DIAGNOSIS — G90.522 COMPLEX REGIONAL PAIN SYNDROME TYPE 1 OF LEFT LOWER EXTREMITY: ICD-10-CM

## 2022-07-27 DIAGNOSIS — R06.00 DYSPNEA, UNSPECIFIED TYPE: Primary | ICD-10-CM

## 2022-07-27 LAB
ACETONE BLD QL: NEGATIVE
ALBUMIN SERPL-MCNC: 4.2 G/DL (ref 3.5–5.2)
ALBUMIN/GLOB SERPL: 1.1 G/DL
ALP SERPL-CCNC: 875 U/L (ref 39–117)
ALT SERPL W P-5'-P-CCNC: 22 U/L (ref 1–33)
ANION GAP SERPL CALCULATED.3IONS-SCNC: 15 MMOL/L (ref 5–15)
ANISOCYTOSIS BLD QL: ABNORMAL
ARTERIAL PATENCY WRIST A: POSITIVE
AST SERPL-CCNC: 21 U/L (ref 1–32)
ATMOSPHERIC PRESS: ABNORMAL MM[HG]
B PARAPERT DNA SPEC QL NAA+PROBE: NOT DETECTED
B PERT DNA SPEC QL NAA+PROBE: NOT DETECTED
BASE EXCESS BLDA CALC-SCNC: -0.6 MMOL/L (ref 0–3)
BASOPHILS # BLD MANUAL: 47.3 10*3/MM3 (ref 0–0.2)
BASOPHILS NFR BLD MANUAL: 22 % (ref 0–1.5)
BDY SITE: ABNORMAL
BILIRUB SERPL-MCNC: 1.5 MG/DL (ref 0–1.2)
BLASTS NFR BLD MANUAL: 26 % (ref 0–0)
BUN SERPL-MCNC: 10 MG/DL (ref 6–20)
BUN/CREAT SERPL: 18.2 (ref 7–25)
C PNEUM DNA NPH QL NAA+NON-PROBE: NOT DETECTED
CALCIUM SPEC-SCNC: 9.4 MG/DL (ref 8.6–10.5)
CHLORIDE SERPL-SCNC: 98 MMOL/L (ref 98–107)
CO2 BLDA-SCNC: 23.1 MMOL/L (ref 22–29)
CO2 SERPL-SCNC: 23 MMOL/L (ref 22–29)
CREAT SERPL-MCNC: 0.55 MG/DL (ref 0.57–1)
D-LACTATE SERPL-SCNC: 1.7 MMOL/L (ref 0.5–2)
DEPRECATED RDW RBC AUTO: 62.1 FL (ref 37–54)
EGFRCR SERPLBLD CKD-EPI 2021: 114.6 ML/MIN/1.73
EOSINOPHIL # BLD MANUAL: 8.6 10*3/MM3 (ref 0–0.4)
EOSINOPHIL NFR BLD MANUAL: 4 % (ref 0.3–6.2)
ERYTHROCYTE [DISTWIDTH] IN BLOOD BY AUTOMATED COUNT: 21.9 % (ref 12.3–15.4)
FLUAV SUBTYP SPEC NAA+PROBE: NOT DETECTED
FLUBV RNA ISLT QL NAA+PROBE: NOT DETECTED
GLOBULIN UR ELPH-MCNC: 3.9 GM/DL
GLUCOSE SERPL-MCNC: 367 MG/DL (ref 65–99)
HADV DNA SPEC NAA+PROBE: NOT DETECTED
HCG INTACT+B SERPL-ACNC: <1 MIU/ML
HCO3 BLDA-SCNC: 22.2 MMOL/L (ref 21–28)
HCOV 229E RNA SPEC QL NAA+PROBE: NOT DETECTED
HCOV HKU1 RNA SPEC QL NAA+PROBE: NOT DETECTED
HCOV NL63 RNA SPEC QL NAA+PROBE: NOT DETECTED
HCOV OC43 RNA SPEC QL NAA+PROBE: NOT DETECTED
HCT VFR BLD AUTO: 28.5 % (ref 34–46.6)
HEMODILUTION: NO
HGB BLD-MCNC: 8.5 G/DL (ref 12–15.9)
HMPV RNA NPH QL NAA+NON-PROBE: NOT DETECTED
HPIV1 RNA ISLT QL NAA+PROBE: NOT DETECTED
HPIV2 RNA SPEC QL NAA+PROBE: NOT DETECTED
HPIV3 RNA NPH QL NAA+PROBE: NOT DETECTED
HPIV4 P GENE NPH QL NAA+PROBE: NOT DETECTED
INHALED O2 CONCENTRATION: 28 %
LARGE PLATELETS: ABNORMAL
LYMPHOCYTES # BLD MANUAL: 8.6 10*3/MM3 (ref 0.7–3.1)
LYMPHOCYTES NFR BLD MANUAL: 3 % (ref 5–12)
M PNEUMO IGG SER IA-ACNC: NOT DETECTED
MCH RBC QN AUTO: 24.5 PG (ref 26.6–33)
MCHC RBC AUTO-ENTMCNC: 29.7 G/DL (ref 31.5–35.7)
MCV RBC AUTO: 82.4 FL (ref 79–97)
MODALITY: ABNORMAL
MONOCYTES # BLD: 6.45 10*3/MM3 (ref 0.1–0.9)
NEUTROPHILS # BLD AUTO: 83.85 10*3/MM3 (ref 1.7–7)
NEUTROPHILS NFR BLD MANUAL: 34 % (ref 42.7–76)
NEUTS BAND NFR BLD MANUAL: 5 % (ref 0–5)
NRBC SPEC MANUAL: 1 /100 WBC (ref 0–0.2)
NT-PROBNP SERPL-MCNC: ABNORMAL PG/ML (ref 0–450)
PCO2 BLDA: 29.5 MM HG (ref 35–48)
PH BLDA: 7.48 PH UNITS (ref 7.35–7.45)
PLATELET # BLD AUTO: 306 10*3/MM3 (ref 140–450)
PMV BLD AUTO: 7.3 FL (ref 6–12)
PO2 BLDA: 57.7 MM HG (ref 83–108)
POLYCHROMASIA BLD QL SMEAR: ABNORMAL
POTASSIUM SERPL-SCNC: 3.9 MMOL/L (ref 3.5–5.2)
PROMYELOCYTES NFR BLD MANUAL: 2 % (ref 0–0)
PROT SERPL-MCNC: 8.1 G/DL (ref 6–8.5)
RBC # BLD AUTO: 3.46 10*6/MM3 (ref 3.77–5.28)
RHINOVIRUS RNA SPEC NAA+PROBE: NOT DETECTED
RSV RNA NPH QL NAA+NON-PROBE: NOT DETECTED
SAO2 % BLDCOA: 92 % (ref 94–98)
SARS-COV-2 RNA NPH QL NAA+NON-PROBE: NOT DETECTED
SCAN SLIDE: NORMAL
SODIUM SERPL-SCNC: 136 MMOL/L (ref 136–145)
TROPONIN T SERPL-MCNC: <0.01 NG/ML (ref 0–0.03)
VARIANT LYMPHS NFR BLD MANUAL: 4 % (ref 19.6–45.3)
WBC MORPH BLD: NORMAL
WBC NRBC COR # BLD: 215 10*3/MM3 (ref 3.4–10.8)

## 2022-07-27 PROCEDURE — 87040 BLOOD CULTURE FOR BACTERIA: CPT | Performed by: NURSE PRACTITIONER

## 2022-07-27 PROCEDURE — 80053 COMPREHEN METABOLIC PANEL: CPT | Performed by: NURSE PRACTITIONER

## 2022-07-27 PROCEDURE — 84100 ASSAY OF PHOSPHORUS: CPT | Performed by: INTERNAL MEDICINE

## 2022-07-27 PROCEDURE — 93005 ELECTROCARDIOGRAM TRACING: CPT | Performed by: EMERGENCY MEDICINE

## 2022-07-27 PROCEDURE — 82009 KETONE BODYS QUAL: CPT | Performed by: NURSE PRACTITIONER

## 2022-07-27 PROCEDURE — 83880 ASSAY OF NATRIURETIC PEPTIDE: CPT | Performed by: NURSE PRACTITIONER

## 2022-07-27 PROCEDURE — 84550 ASSAY OF BLOOD/URIC ACID: CPT | Performed by: INTERNAL MEDICINE

## 2022-07-27 PROCEDURE — 93005 ELECTROCARDIOGRAM TRACING: CPT

## 2022-07-27 PROCEDURE — 99285 EMERGENCY DEPT VISIT HI MDM: CPT

## 2022-07-27 PROCEDURE — 71045 X-RAY EXAM CHEST 1 VIEW: CPT

## 2022-07-27 PROCEDURE — 84702 CHORIONIC GONADOTROPIN TEST: CPT | Performed by: NURSE PRACTITIONER

## 2022-07-27 PROCEDURE — 82803 BLOOD GASES ANY COMBINATION: CPT

## 2022-07-27 PROCEDURE — 36600 WITHDRAWAL OF ARTERIAL BLOOD: CPT

## 2022-07-27 PROCEDURE — 71275 CT ANGIOGRAPHY CHEST: CPT

## 2022-07-27 PROCEDURE — 82550 ASSAY OF CK (CPK): CPT | Performed by: INTERNAL MEDICINE

## 2022-07-27 PROCEDURE — 99223 1ST HOSP IP/OBS HIGH 75: CPT | Performed by: INTERNAL MEDICINE

## 2022-07-27 PROCEDURE — 25010000002 FUROSEMIDE PER 20 MG: Performed by: NURSE PRACTITIONER

## 2022-07-27 PROCEDURE — 84484 ASSAY OF TROPONIN QUANT: CPT | Performed by: NURSE PRACTITIONER

## 2022-07-27 PROCEDURE — 63710000001 INSULIN REGULAR HUMAN PER 5 UNITS: Performed by: NURSE PRACTITIONER

## 2022-07-27 PROCEDURE — 85025 COMPLETE CBC W/AUTO DIFF WBC: CPT | Performed by: NURSE PRACTITIONER

## 2022-07-27 PROCEDURE — 99214 OFFICE O/P EST MOD 30 MIN: CPT | Performed by: STUDENT IN AN ORGANIZED HEALTH CARE EDUCATION/TRAINING PROGRAM

## 2022-07-27 PROCEDURE — 0202U NFCT DS 22 TRGT SARS-COV-2: CPT | Performed by: NURSE PRACTITIONER

## 2022-07-27 PROCEDURE — 0 IOPAMIDOL PER 1 ML: Performed by: EMERGENCY MEDICINE

## 2022-07-27 PROCEDURE — 85007 BL SMEAR W/DIFF WBC COUNT: CPT | Performed by: NURSE PRACTITIONER

## 2022-07-27 PROCEDURE — 83605 ASSAY OF LACTIC ACID: CPT

## 2022-07-27 RX ORDER — OXYCODONE AND ACETAMINOPHEN 10; 325 MG/1; MG/1
1 TABLET ORAL EVERY 8 HOURS PRN
Qty: 30 TABLET | Refills: 0 | Status: SHIPPED | OUTPATIENT
Start: 2022-08-26 | End: 2022-07-28 | Stop reason: SDUPTHER

## 2022-07-27 RX ORDER — FUROSEMIDE 10 MG/ML
40 INJECTION INTRAMUSCULAR; INTRAVENOUS ONCE
Status: COMPLETED | OUTPATIENT
Start: 2022-07-27 | End: 2022-07-27

## 2022-07-27 RX ORDER — OXYCODONE AND ACETAMINOPHEN 10; 325 MG/1; MG/1
1 TABLET ORAL EVERY 8 HOURS PRN
Qty: 90 TABLET | Refills: 0 | Status: SHIPPED | OUTPATIENT
Start: 2022-09-24 | End: 2022-07-28

## 2022-07-27 RX ORDER — OXYCODONE AND ACETAMINOPHEN 10; 325 MG/1; MG/1
1 TABLET ORAL EVERY 6 HOURS PRN
Qty: 30 TABLET | Refills: 0 | Status: SHIPPED | OUTPATIENT
Start: 2022-07-27 | End: 2022-07-28

## 2022-07-27 RX ORDER — SODIUM CHLORIDE 0.9 % (FLUSH) 0.9 %
10 SYRINGE (ML) INJECTION AS NEEDED
Status: DISCONTINUED | OUTPATIENT
Start: 2022-07-27 | End: 2022-08-05 | Stop reason: HOSPADM

## 2022-07-27 RX ADMIN — INSULIN HUMAN 6 UNITS: 100 INJECTION, SOLUTION PARENTERAL at 23:01

## 2022-07-27 RX ADMIN — IOPAMIDOL 100 ML: 755 INJECTION, SOLUTION INTRAVENOUS at 23:45

## 2022-07-27 RX ADMIN — FUROSEMIDE 40 MG: 10 INJECTION, SOLUTION INTRAMUSCULAR; INTRAVENOUS at 21:59

## 2022-07-27 NOTE — PROGRESS NOTES
CHIEF COMPLAINT  Chief Complaint   Patient presents with   • Pain     F/u from Lumbar Sympathetic Block, left, with fluoroscopy, CC: Bilateral leg pain wants to discuss pain stimulator treated w/Oxycodone 07/27 @ 2:30 pm   Patient recently dx with heart failure will be treated by Dr. Alicea         Primary Care  Alida Latham APRN Subjective   Nieves Mc is a 46 y.o. female  who presents for cancer-related pain and left lower extremity allodynia.  She states that she is having progressively worsening pain for many years following the diagnosis of CML.  She has been previously treated with over-the-counter medications which are no longer effective.  She states in the past, she had taken some hydrocodone which was also not effective.  She is unable to tolerate gabapentin due to side effects.  In addition to describing generalized leg and body aches from her cancer, she also describes allodynia-like symptoms in the left leg.  She states is difficult for her to wear shoes due to significant pain in the left foot.  She also describes pain with light touch of the left lower extremity.  She endorses swelling in the left lower extremity as well as changing of temperature left lower extremity.  She also has significant trouble walking and some weakness    Pain  Associated symptoms include arthralgias, fatigue, numbness and weakness.   Leg Pain   Associated symptoms include numbness.   Extremity Pain   Associated symptoms include numbness.        Location: Whole body, left lower extremity  Onset: Years ago  Duration: Progressively worsening  Timing: Constant throughout the day  Quality: Aching sharp pains in the left lower extremity  Severity: Today: 6       Last Week: 6       Worst: 10  Modifying Factors: The pain is worse with any type of movement and physical activity.  Pain is also exacerbated by light touch of the left lower extremity    Physical Therapy: no    Interval Update 07/27/2022: Continues to  have progressively worsening pain.  She has had multiple hospital admissions for combination of dyspnea as well as CHF type presentation.  She presents today complaining of progressively worsening swelling in her legs and feet with progressive weakness and difficulty breathing.    The following portions of the patient's history were reviewed and updated as appropriate: allergies, current medications, past family history, past medical history, past social history, past surgical history and problem list.      Current Outpatient Medications:   •  allopurinol (ZYLOPRIM) 100 MG tablet, Take 1 tablet by mouth Daily., Disp: 30 tablet, Rfl: 0  •  ALPRAZolam (Xanax) 0.25 MG tablet, Take 1 tablet by mouth At Night As Needed for Anxiety., Disp: 30 tablet, Rfl: 0  •  citalopram (CeleXA) 10 MG tablet, TAKE 1 TABLET BY MOUTH DAILY. TO BEGIN 1/31/22, Disp: 30 tablet, Rfl: 1  •  folic acid (FOLVITE) 1 MG tablet, Take 1 tablet by mouth Daily., Disp: 30 tablet, Rfl: 0  •  gabapentin (NEURONTIN) 300 MG capsule, Take 1 capsule by mouth 3 (Three) Times a Day., Disp: 90 capsule, Rfl: 0  •  guaiFENesin (MUCINEX) 600 MG 12 hr tablet, Take 1 tablet by mouth Every 12 (Twelve) Hours., Disp: 30 tablet, Rfl: 0  •  hydroxyurea (HYDREA) 500 MG capsule, Take 1 capsule by mouth Daily., Disp: 60 capsule, Rfl: 0  •  Insulin Glargine (BASAGLAR KWIKPEN) 100 UNIT/ML injection pen, Inject 20 Units under the skin into the appropriate area as directed Daily., Disp: 10 mL, Rfl: 3  •  Insulin Lispro (ADMELOG SOLOSTAR) 100 UNIT/ML injection pen, Inject 6 Units under the skin into the appropriate area as directed 3 (Three) Times a Day., Disp: 3 mL, Rfl: 3  •  Insulin Pen Needle (Pen Needles) 32G X 4 MM misc, Use with insulins (Four) Times a Day Before Meals & at Bedtime., Disp: 200 each, Rfl: 2  •  losartan (COZAAR) 25 MG tablet, Take 1 tablet by mouth Daily., Disp: 30 tablet, Rfl: 0  •  metoprolol succinate XL (TOPROL-XL) 25 MG 24 hr tablet, Take 25 mg by  mouth Daily., Disp: , Rfl:   •  nilotinib (TASIGNA) 150 MG capsule capsule, Take 150 mg by mouth 2 (Two) Times a Day Before Meals., Disp: , Rfl:   •  [START ON 9/24/2022] oxyCODONE-acetaminophen (PERCOCET)  MG per tablet, Take 1 tablet by mouth Every 8 (Eight) Hours As Needed for Moderate Pain ., Disp: 90 tablet, Rfl: 0  •  rivaroxaban (XARELTO) 20 MG tablet, Take 1 tablet by mouth Daily., Disp: 90 tablet, Rfl: 0  •  traZODone (DESYREL) 50 MG tablet, Take 50 mg by mouth Every Night., Disp: , Rfl:   •  [START ON 8/26/2022] oxyCODONE-acetaminophen (PERCOCET)  MG per tablet, Take 1 tablet by mouth Every 8 (Eight) Hours As Needed for Moderate Pain ., Disp: 30 tablet, Rfl: 0  •  oxyCODONE-acetaminophen (PERCOCET)  MG per tablet, Take 1 tablet by mouth Every 6 (Six) Hours As Needed for Moderate Pain ., Disp: 30 tablet, Rfl: 0    Review of Systems   Constitutional: Positive for fatigue.   Musculoskeletal: Positive for arthralgias and gait problem.   Neurological: Positive for weakness and numbness.       Vitals:    07/27/22 1542   BP: 151/93   Pulse: 116   Resp: 16   SpO2: 95%   Weight: 57.6 kg (127 lb)   PainSc:   8       Urine Drug Screen: 6/7/2021  Appropriate: Yes    Objective   Physical Exam  Vitals and nursing note reviewed.   Constitutional:       General: She is not in acute distress.     Appearance: Normal appearance. She is normal weight.   Musculoskeletal:         General: Swelling and tenderness present.      Comments: Left lower extremity:  1.  Diffusely tender to even light palpation with signs of allodynia in the left leg and left foot  2.  Swelling present in the left foot compared to the right foot  3.  Slight decreased temperature in the left leg compared to the right leg   Skin:     General: Skin is warm and dry.   Neurological:      Mental Status: She is alert.           Assessment & Plan   Problems Addressed this Visit    None     Visit Diagnoses     Complex regional pain syndrome  type 1 of left lower extremity        Relevant Medications    oxyCODONE-acetaminophen (PERCOCET)  MG per tablet (Start on 9/24/2022)    oxyCODONE-acetaminophen (PERCOCET)  MG per tablet (Start on 8/26/2022)    oxyCODONE-acetaminophen (PERCOCET)  MG per tablet      Diagnoses       Codes Comments    Complex regional pain syndrome type 1 of left lower extremity     ICD-10-CM: G90.522  ICD-9-CM: 337.22           Plan:  1. Continue oxycodone 3 times daily  2. MRI essentially unremarkable  3. I encouraged her to return to the ER if she feels her symptoms are worsening as she does have some swelling and has a history of CHF in the past  --- Follow-up 3 months           INSPECT REPORT    As part of the patient's treatment plan, I may be prescribing controlled substances. The patient has been made aware of appropriate use of such medications, including potential risk of somnolence, limited ability to drive and/or work safely, and the potential for dependence or overdose. It has also bee made clear that these medications are for use by this patient only, without concomitant use of alcohol or other substances unless prescribed.     Patient has completed prescribing agreement detailing terms of continued prescribing of controlled substances, including monitoring SMITH reports, urine drug screening, and pill counts if necessary. The patient is aware that inappropriate use will results in cessation of prescribing such medications.    INSPECT report has been reviewed and scanned into the patient's chart.    As the clinician, I personally reviewed the INSPECT from 7/26/2022.    History and physical exam exhibit continued safe and appropriate use of controlled substances.      EMR Dragon/Transcription disclaimer:   Much of this encounter note is an electronic transcription/translation of spoken language to printed text. The electronic translation of spoken language may permit erroneous, or at times, nonsensical  words or phrases to be inadvertently transcribed; Although I have reviewed the note for such errors, some may still exist.

## 2022-07-28 ENCOUNTER — READMISSION MANAGEMENT (OUTPATIENT)
Dept: CALL CENTER | Facility: HOSPITAL | Age: 47
End: 2022-07-28

## 2022-07-28 PROBLEM — G89.29 CHRONIC PAIN: Status: ACTIVE | Noted: 2022-07-28

## 2022-07-28 PROBLEM — J96.01 ACUTE RESPIRATORY FAILURE WITH HYPOXIA: Status: ACTIVE | Noted: 2022-07-28

## 2022-07-28 PROBLEM — F11.20 NARCOTIC DEPENDENCE (HCC): Status: ACTIVE | Noted: 2022-07-28

## 2022-07-28 PROBLEM — C92.10 BLAST CRISIS PHASE OF CHRONIC MYELOID LEUKEMIA: Status: ACTIVE | Noted: 2022-07-28

## 2022-07-28 PROBLEM — R06.00 DYSPNEA, UNSPECIFIED TYPE: Status: ACTIVE | Noted: 2022-07-28

## 2022-07-28 LAB
ANION GAP SERPL CALCULATED.3IONS-SCNC: 13 MMOL/L (ref 5–15)
ANISOCYTOSIS BLD QL: ABNORMAL
ANISOCYTOSIS BLD QL: ABNORMAL
BASOPHILS # BLD MANUAL: 2.26 10*3/MM3 (ref 0–0.2)
BASOPHILS # BLD MANUAL: 43.49 10*3/MM3 (ref 0–0.2)
BASOPHILS NFR BLD MANUAL: 1 % (ref 0–1.5)
BASOPHILS NFR BLD MANUAL: 19 % (ref 0–1.5)
BLASTS NFR BLD MANUAL: 14 % (ref 0–0)
BLASTS NFR BLD MANUAL: 18 % (ref 0–0)
BUN SERPL-MCNC: 15 MG/DL (ref 6–20)
BUN/CREAT SERPL: 28.8 (ref 7–25)
CALCIUM SPEC-SCNC: 8.6 MG/DL (ref 8.6–10.5)
CHLORIDE SERPL-SCNC: 95 MMOL/L (ref 98–107)
CK SERPL-CCNC: 19 U/L (ref 20–180)
CO2 SERPL-SCNC: 24 MMOL/L (ref 22–29)
CREAT SERPL-MCNC: 0.52 MG/DL (ref 0.57–1)
DEPRECATED RDW RBC AUTO: 62.6 FL (ref 37–54)
DEPRECATED RDW RBC AUTO: 64.3 FL (ref 37–54)
EGFRCR SERPLBLD CKD-EPI 2021: 116.2 ML/MIN/1.73
EOSINOPHIL # BLD MANUAL: 11.45 10*3/MM3 (ref 0–0.4)
EOSINOPHIL # BLD MANUAL: 6.77 10*3/MM3 (ref 0–0.4)
EOSINOPHIL NFR BLD MANUAL: 3 % (ref 0.3–6.2)
EOSINOPHIL NFR BLD MANUAL: 5 % (ref 0.3–6.2)
ERYTHROCYTE [DISTWIDTH] IN BLOOD BY AUTOMATED COUNT: 22.1 % (ref 12.3–15.4)
ERYTHROCYTE [DISTWIDTH] IN BLOOD BY AUTOMATED COUNT: 22.2 % (ref 12.3–15.4)
GLUCOSE BLDC GLUCOMTR-MCNC: 280 MG/DL (ref 70–105)
GLUCOSE BLDC GLUCOMTR-MCNC: 301 MG/DL (ref 70–105)
GLUCOSE BLDC GLUCOMTR-MCNC: 330 MG/DL (ref 70–105)
GLUCOSE BLDC GLUCOMTR-MCNC: 385 MG/DL (ref 70–105)
GLUCOSE BLDC GLUCOMTR-MCNC: 434 MG/DL (ref 70–105)
GLUCOSE BLDC GLUCOMTR-MCNC: 536 MG/DL (ref 70–105)
GLUCOSE BLDC GLUCOMTR-MCNC: 549 MG/DL (ref 70–105)
GLUCOSE SERPL-MCNC: 517 MG/DL (ref 65–99)
HBA1C MFR BLD: 7.1 % (ref 3.5–5.6)
HCT VFR BLD AUTO: 25.1 % (ref 34–46.6)
HCT VFR BLD AUTO: 26.6 % (ref 34–46.6)
HGB BLD-MCNC: 7.3 G/DL (ref 12–15.9)
HGB BLD-MCNC: 7.8 G/DL (ref 12–15.9)
LDH SERPL-CCNC: 1974 U/L (ref 135–214)
LYMPHOCYTES # BLD MANUAL: 6.77 10*3/MM3 (ref 0.7–3.1)
LYMPHOCYTES # BLD MANUAL: 6.87 10*3/MM3 (ref 0.7–3.1)
LYMPHOCYTES NFR BLD MANUAL: 2 % (ref 5–12)
LYMPHOCYTES NFR BLD MANUAL: 3 % (ref 5–12)
MACROCYTES BLD QL SMEAR: ABNORMAL
MCH RBC QN AUTO: 23.8 PG (ref 26.6–33)
MCH RBC QN AUTO: 24.7 PG (ref 26.6–33)
MCHC RBC AUTO-ENTMCNC: 28.9 G/DL (ref 31.5–35.7)
MCHC RBC AUTO-ENTMCNC: 29.4 G/DL (ref 31.5–35.7)
MCV RBC AUTO: 82.4 FL (ref 79–97)
MCV RBC AUTO: 84 FL (ref 79–97)
METAMYELOCYTES NFR BLD MANUAL: 13 % (ref 0–0)
METAMYELOCYTES NFR BLD MANUAL: 13 % (ref 0–0)
MICROCYTES BLD QL: ABNORMAL
MONOCYTES # BLD: 4.51 10*3/MM3 (ref 0.1–0.9)
MONOCYTES # BLD: 6.87 10*3/MM3 (ref 0.1–0.9)
MRSA DNA SPEC QL NAA+PROBE: NORMAL
MYELOCYTES NFR BLD MANUAL: 12 % (ref 0–0)
MYELOCYTES NFR BLD MANUAL: 3 % (ref 0–0)
NEUTROPHILS # BLD AUTO: 73.25 10*3/MM3 (ref 1.7–7)
NEUTROPHILS # BLD AUTO: 79 10*3/MM3 (ref 1.7–7)
NEUTROPHILS NFR BLD MANUAL: 23 % (ref 42.7–76)
NEUTROPHILS NFR BLD MANUAL: 28 % (ref 42.7–76)
NEUTS BAND NFR BLD MANUAL: 12 % (ref 0–5)
NEUTS BAND NFR BLD MANUAL: 4 % (ref 0–5)
NRBC SPEC MANUAL: 2 /100 WBC (ref 0–0.2)
PHOSPHATE SERPL-MCNC: 3.7 MG/DL (ref 2.5–4.5)
PLAT MORPH BLD: NORMAL
PLAT MORPH BLD: NORMAL
PLATELET # BLD AUTO: 269 10*3/MM3 (ref 140–450)
PLATELET # BLD AUTO: 271 10*3/MM3 (ref 140–450)
PMV BLD AUTO: 7.6 FL (ref 6–12)
PMV BLD AUTO: 7.7 FL (ref 6–12)
POLYCHROMASIA BLD QL SMEAR: ABNORMAL
POLYCHROMASIA BLD QL SMEAR: ABNORMAL
POTASSIUM SERPL-SCNC: 4.6 MMOL/L (ref 3.5–5.2)
PROMYELOCYTES NFR BLD MANUAL: 13 % (ref 0–0)
PROMYELOCYTES NFR BLD MANUAL: 8 % (ref 0–0)
RBC # BLD AUTO: 3.05 10*6/MM3 (ref 3.77–5.28)
RBC # BLD AUTO: 3.16 10*6/MM3 (ref 3.77–5.28)
SCAN SLIDE: NORMAL
SCAN SLIDE: NORMAL
SODIUM SERPL-SCNC: 132 MMOL/L (ref 136–145)
URATE SERPL-MCNC: 9 MG/DL (ref 2.4–5.7)
VARIANT LYMPHS NFR BLD MANUAL: 3 % (ref 19.6–45.3)
VARIANT LYMPHS NFR BLD MANUAL: 3 % (ref 19.6–45.3)
WBC MORPH BLD: NORMAL
WBC MORPH BLD: NORMAL
WBC NRBC COR # BLD: 225.7 10*3/MM3 (ref 3.4–10.8)
WBC NRBC COR # BLD: 228.9 10*3/MM3 (ref 3.4–10.8)

## 2022-07-28 PROCEDURE — 80048 BASIC METABOLIC PNL TOTAL CA: CPT | Performed by: INTERNAL MEDICINE

## 2022-07-28 PROCEDURE — 85007 BL SMEAR W/DIFF WBC COUNT: CPT | Performed by: NURSE PRACTITIONER

## 2022-07-28 PROCEDURE — 85007 BL SMEAR W/DIFF WBC COUNT: CPT | Performed by: INTERNAL MEDICINE

## 2022-07-28 PROCEDURE — 25010000002 CEFEPIME PER 500 MG: Performed by: NURSE PRACTITIONER

## 2022-07-28 PROCEDURE — 83036 HEMOGLOBIN GLYCOSYLATED A1C: CPT | Performed by: INTERNAL MEDICINE

## 2022-07-28 PROCEDURE — 36415 COLL VENOUS BLD VENIPUNCTURE: CPT | Performed by: INTERNAL MEDICINE

## 2022-07-28 PROCEDURE — 63710000001 INSULIN LISPRO (HUMAN) PER 5 UNITS: Performed by: INTERNAL MEDICINE

## 2022-07-28 PROCEDURE — 87641 MR-STAPH DNA AMP PROBE: CPT | Performed by: INTERNAL MEDICINE

## 2022-07-28 PROCEDURE — 82962 GLUCOSE BLOOD TEST: CPT

## 2022-07-28 PROCEDURE — 83615 LACTATE (LD) (LDH) ENZYME: CPT | Performed by: NURSE PRACTITIONER

## 2022-07-28 PROCEDURE — 25010000002 METHYLPREDNISOLONE PER 125 MG: Performed by: NURSE PRACTITIONER

## 2022-07-28 PROCEDURE — 25010000002 VANCOMYCIN HCL 1.25 G RECONSTITUTED SOLUTION 1 EACH VIAL: Performed by: NURSE PRACTITIONER

## 2022-07-28 PROCEDURE — 99221 1ST HOSP IP/OBS SF/LOW 40: CPT | Performed by: INTERNAL MEDICINE

## 2022-07-28 PROCEDURE — 63710000001 INSULIN GLARGINE PER 5 UNITS: Performed by: INTERNAL MEDICINE

## 2022-07-28 PROCEDURE — 85025 COMPLETE CBC W/AUTO DIFF WBC: CPT | Performed by: INTERNAL MEDICINE

## 2022-07-28 PROCEDURE — 85025 COMPLETE CBC W/AUTO DIFF WBC: CPT | Performed by: NURSE PRACTITIONER

## 2022-07-28 PROCEDURE — 25010000002 CEFEPIME PER 500 MG: Performed by: INTERNAL MEDICINE

## 2022-07-28 PROCEDURE — 25010000002 AZITHROMYCIN PER 500 MG: Performed by: NURSE PRACTITIONER

## 2022-07-28 PROCEDURE — 25010000002 METHYLPREDNISOLONE PER 40 MG: Performed by: INTERNAL MEDICINE

## 2022-07-28 RX ORDER — ACETAMINOPHEN 650 MG/1
650 SUPPOSITORY RECTAL EVERY 4 HOURS PRN
Status: DISCONTINUED | OUTPATIENT
Start: 2022-07-28 | End: 2022-08-05 | Stop reason: HOSPADM

## 2022-07-28 RX ORDER — ACETAMINOPHEN 325 MG/1
650 TABLET ORAL EVERY 4 HOURS PRN
Status: DISCONTINUED | OUTPATIENT
Start: 2022-07-28 | End: 2022-08-05 | Stop reason: HOSPADM

## 2022-07-28 RX ORDER — INSULIN LISPRO 100 [IU]/ML
0-9 INJECTION, SOLUTION INTRAVENOUS; SUBCUTANEOUS
Status: DISCONTINUED | OUTPATIENT
Start: 2022-07-28 | End: 2022-07-29

## 2022-07-28 RX ORDER — ONDANSETRON 4 MG/1
4 TABLET, FILM COATED ORAL EVERY 6 HOURS PRN
Status: DISCONTINUED | OUTPATIENT
Start: 2022-07-28 | End: 2022-08-05 | Stop reason: HOSPADM

## 2022-07-28 RX ORDER — DEXTROSE MONOHYDRATE 25 G/50ML
25 INJECTION, SOLUTION INTRAVENOUS
Status: DISCONTINUED | OUTPATIENT
Start: 2022-07-28 | End: 2022-08-05 | Stop reason: HOSPADM

## 2022-07-28 RX ORDER — HYDRALAZINE HYDROCHLORIDE 20 MG/ML
10 INJECTION INTRAMUSCULAR; INTRAVENOUS EVERY 6 HOURS PRN
Status: DISCONTINUED | OUTPATIENT
Start: 2022-07-28 | End: 2022-08-05 | Stop reason: HOSPADM

## 2022-07-28 RX ORDER — IPRATROPIUM BROMIDE AND ALBUTEROL SULFATE 2.5; .5 MG/3ML; MG/3ML
3 SOLUTION RESPIRATORY (INHALATION)
Status: DISCONTINUED | OUTPATIENT
Start: 2022-07-28 | End: 2022-07-31

## 2022-07-28 RX ORDER — HYDROCODONE BITARTRATE AND ACETAMINOPHEN 10; 325 MG/1; MG/1
1 TABLET ORAL EVERY 6 HOURS PRN
Status: DISPENSED | OUTPATIENT
Start: 2022-07-28 | End: 2022-08-04

## 2022-07-28 RX ORDER — ENOXAPARIN SODIUM 100 MG/ML
40 INJECTION SUBCUTANEOUS DAILY
Status: DISCONTINUED | OUTPATIENT
Start: 2022-07-28 | End: 2022-07-28

## 2022-07-28 RX ORDER — FOLIC ACID 1 MG/1
1 TABLET ORAL DAILY
Status: DISCONTINUED | OUTPATIENT
Start: 2022-07-28 | End: 2022-08-05 | Stop reason: HOSPADM

## 2022-07-28 RX ORDER — METHYLPREDNISOLONE SODIUM SUCCINATE 125 MG/2ML
125 INJECTION, POWDER, LYOPHILIZED, FOR SOLUTION INTRAMUSCULAR; INTRAVENOUS ONCE
Status: COMPLETED | OUTPATIENT
Start: 2022-07-28 | End: 2022-07-28

## 2022-07-28 RX ORDER — SODIUM CHLORIDE 0.9 % (FLUSH) 0.9 %
10 SYRINGE (ML) INJECTION AS NEEDED
Status: DISCONTINUED | OUTPATIENT
Start: 2022-07-28 | End: 2022-08-05 | Stop reason: HOSPADM

## 2022-07-28 RX ORDER — ACETAMINOPHEN 160 MG/5ML
650 SOLUTION ORAL EVERY 4 HOURS PRN
Status: DISCONTINUED | OUTPATIENT
Start: 2022-07-28 | End: 2022-08-05 | Stop reason: HOSPADM

## 2022-07-28 RX ORDER — MIRTAZAPINE 15 MG/1
15 TABLET, FILM COATED ORAL NIGHTLY
Status: DISCONTINUED | OUTPATIENT
Start: 2022-07-28 | End: 2022-08-05 | Stop reason: HOSPADM

## 2022-07-28 RX ORDER — ALUMINA, MAGNESIA, AND SIMETHICONE 2400; 2400; 240 MG/30ML; MG/30ML; MG/30ML
15 SUSPENSION ORAL EVERY 6 HOURS PRN
Status: DISCONTINUED | OUTPATIENT
Start: 2022-07-28 | End: 2022-08-05 | Stop reason: HOSPADM

## 2022-07-28 RX ORDER — METHYLPREDNISOLONE SODIUM SUCCINATE 40 MG/ML
40 INJECTION, POWDER, LYOPHILIZED, FOR SOLUTION INTRAMUSCULAR; INTRAVENOUS EVERY 8 HOURS
Status: DISCONTINUED | OUTPATIENT
Start: 2022-07-28 | End: 2022-07-30

## 2022-07-28 RX ORDER — INSULIN LISPRO 100 [IU]/ML
5 INJECTION, SOLUTION INTRAVENOUS; SUBCUTANEOUS
Status: DISCONTINUED | OUTPATIENT
Start: 2022-07-28 | End: 2022-08-04

## 2022-07-28 RX ORDER — GABAPENTIN 300 MG/1
300 CAPSULE ORAL 3 TIMES DAILY
Status: DISCONTINUED | OUTPATIENT
Start: 2022-07-28 | End: 2022-08-05 | Stop reason: HOSPADM

## 2022-07-28 RX ORDER — LOSARTAN POTASSIUM 25 MG/1
25 TABLET ORAL
Status: DISCONTINUED | OUTPATIENT
Start: 2022-07-28 | End: 2022-08-05 | Stop reason: HOSPADM

## 2022-07-28 RX ORDER — OXYCODONE AND ACETAMINOPHEN 10; 325 MG/1; MG/1
1 TABLET ORAL EVERY 8 HOURS PRN
COMMUNITY
End: 2022-09-12 | Stop reason: SDUPTHER

## 2022-07-28 RX ORDER — CITALOPRAM 20 MG/1
10 TABLET ORAL DAILY
Status: DISCONTINUED | OUTPATIENT
Start: 2022-07-28 | End: 2022-08-05 | Stop reason: HOSPADM

## 2022-07-28 RX ORDER — OXYCODONE HYDROCHLORIDE 5 MG/1
10 TABLET ORAL EVERY 6 HOURS PRN
Status: DISPENSED | OUTPATIENT
Start: 2022-07-28 | End: 2022-08-04

## 2022-07-28 RX ORDER — NITROGLYCERIN 0.4 MG/1
0.4 TABLET SUBLINGUAL
Status: DISCONTINUED | OUTPATIENT
Start: 2022-07-28 | End: 2022-08-05 | Stop reason: HOSPADM

## 2022-07-28 RX ORDER — SODIUM CHLORIDE 0.9 % (FLUSH) 0.9 %
10 SYRINGE (ML) INJECTION EVERY 12 HOURS SCHEDULED
Status: DISCONTINUED | OUTPATIENT
Start: 2022-07-28 | End: 2022-08-05 | Stop reason: HOSPADM

## 2022-07-28 RX ORDER — CHOLECALCIFEROL (VITAMIN D3) 125 MCG
5 CAPSULE ORAL NIGHTLY PRN
Status: DISCONTINUED | OUTPATIENT
Start: 2022-07-28 | End: 2022-08-05 | Stop reason: HOSPADM

## 2022-07-28 RX ORDER — OLANZAPINE 10 MG/2ML
1 INJECTION, POWDER, LYOPHILIZED, FOR SOLUTION INTRAMUSCULAR AS NEEDED
Status: DISCONTINUED | OUTPATIENT
Start: 2022-07-28 | End: 2022-08-05 | Stop reason: HOSPADM

## 2022-07-28 RX ORDER — HYDROXYUREA 500 MG/1
1000 CAPSULE ORAL 2 TIMES DAILY
Status: DISCONTINUED | OUTPATIENT
Start: 2022-07-28 | End: 2022-08-05 | Stop reason: HOSPADM

## 2022-07-28 RX ORDER — ONDANSETRON 2 MG/ML
4 INJECTION INTRAMUSCULAR; INTRAVENOUS EVERY 6 HOURS PRN
Status: DISCONTINUED | OUTPATIENT
Start: 2022-07-28 | End: 2022-08-05 | Stop reason: HOSPADM

## 2022-07-28 RX ORDER — HYDROXYUREA 500 MG/1
1000 CAPSULE ORAL ONCE
Status: COMPLETED | OUTPATIENT
Start: 2022-07-28 | End: 2022-07-28

## 2022-07-28 RX ORDER — INSULIN LISPRO 100 [IU]/ML
0-9 INJECTION, SOLUTION INTRAVENOUS; SUBCUTANEOUS AS NEEDED
Status: DISCONTINUED | OUTPATIENT
Start: 2022-07-28 | End: 2022-07-29

## 2022-07-28 RX ORDER — SODIUM CHLORIDE, SODIUM LACTATE, POTASSIUM CHLORIDE, CALCIUM CHLORIDE 600; 310; 30; 20 MG/100ML; MG/100ML; MG/100ML; MG/100ML
125 INJECTION, SOLUTION INTRAVENOUS CONTINUOUS
Status: DISCONTINUED | OUTPATIENT
Start: 2022-07-28 | End: 2022-07-30 | Stop reason: ALTCHOICE

## 2022-07-28 RX ORDER — GUAIFENESIN 600 MG/1
600 TABLET, EXTENDED RELEASE ORAL EVERY 12 HOURS SCHEDULED
Status: DISCONTINUED | OUTPATIENT
Start: 2022-07-28 | End: 2022-08-05 | Stop reason: HOSPADM

## 2022-07-28 RX ORDER — HYDROXYUREA 500 MG/1
500 CAPSULE ORAL DAILY
Status: DISCONTINUED | OUTPATIENT
Start: 2022-07-28 | End: 2022-07-28

## 2022-07-28 RX ORDER — TRAZODONE HYDROCHLORIDE 50 MG/1
50 TABLET ORAL NIGHTLY
Status: DISCONTINUED | OUTPATIENT
Start: 2022-07-28 | End: 2022-08-05 | Stop reason: HOSPADM

## 2022-07-28 RX ORDER — ALLOPURINOL 100 MG/1
100 TABLET ORAL DAILY
Status: DISCONTINUED | OUTPATIENT
Start: 2022-07-28 | End: 2022-07-29

## 2022-07-28 RX ORDER — NICOTINE POLACRILEX 4 MG
15 LOZENGE BUCCAL
Status: DISCONTINUED | OUTPATIENT
Start: 2022-07-28 | End: 2022-08-05 | Stop reason: HOSPADM

## 2022-07-28 RX ORDER — METOPROLOL SUCCINATE 25 MG/1
25 TABLET, EXTENDED RELEASE ORAL
Status: DISCONTINUED | OUTPATIENT
Start: 2022-07-28 | End: 2022-08-05 | Stop reason: HOSPADM

## 2022-07-28 RX ORDER — ALPRAZOLAM 0.25 MG/1
0.25 TABLET ORAL 2 TIMES DAILY PRN
Status: DISPENSED | OUTPATIENT
Start: 2022-07-28 | End: 2022-08-04

## 2022-07-28 RX ADMIN — OXYCODONE 10 MG: 5 TABLET ORAL at 13:01

## 2022-07-28 RX ADMIN — Medication 10 ML: at 10:04

## 2022-07-28 RX ADMIN — ALLOPURINOL 100 MG: 100 TABLET ORAL at 13:11

## 2022-07-28 RX ADMIN — INSULIN GLARGINE 20 UNITS: 100 INJECTION, SOLUTION SUBCUTANEOUS at 05:58

## 2022-07-28 RX ADMIN — INSULIN GLARGINE 20 UNITS: 100 INJECTION, SOLUTION SUBCUTANEOUS at 23:36

## 2022-07-28 RX ADMIN — GABAPENTIN 300 MG: 300 CAPSULE ORAL at 08:49

## 2022-07-28 RX ADMIN — METHYLPREDNISOLONE SODIUM SUCCINATE 125 MG: 125 INJECTION, POWDER, FOR SOLUTION INTRAMUSCULAR; INTRAVENOUS at 01:49

## 2022-07-28 RX ADMIN — LOSARTAN POTASSIUM 25 MG: 25 TABLET, FILM COATED ORAL at 17:08

## 2022-07-28 RX ADMIN — METHYLPREDNISOLONE SODIUM SUCCINATE 40 MG: 40 INJECTION, POWDER, FOR SOLUTION INTRAMUSCULAR; INTRAVENOUS at 21:09

## 2022-07-28 RX ADMIN — CEFEPIME HYDROCHLORIDE 2 G: 2 INJECTION, POWDER, FOR SOLUTION INTRAVENOUS at 00:41

## 2022-07-28 RX ADMIN — HYDROXYUREA 1000 MG: 500 CAPSULE ORAL at 13:18

## 2022-07-28 RX ADMIN — INSULIN LISPRO 7 UNITS: 100 INJECTION, SOLUTION INTRAVENOUS; SUBCUTANEOUS at 16:59

## 2022-07-28 RX ADMIN — GABAPENTIN 300 MG: 300 CAPSULE ORAL at 21:09

## 2022-07-28 RX ADMIN — MIRTAZAPINE 15 MG: 15 TABLET, FILM COATED ORAL at 23:36

## 2022-07-28 RX ADMIN — INSULIN LISPRO 9 UNITS: 100 INJECTION, SOLUTION INTRAVENOUS; SUBCUTANEOUS at 12:07

## 2022-07-28 RX ADMIN — CEFEPIME HYDROCHLORIDE 2 G: 2 INJECTION, POWDER, FOR SOLUTION INTRAVENOUS at 16:31

## 2022-07-28 RX ADMIN — GABAPENTIN 300 MG: 300 CAPSULE ORAL at 15:28

## 2022-07-28 RX ADMIN — CEFEPIME HYDROCHLORIDE 2 G: 2 INJECTION, POWDER, FOR SOLUTION INTRAVENOUS at 08:49

## 2022-07-28 RX ADMIN — INSULIN LISPRO 5 UNITS: 100 INJECTION, SOLUTION INTRAVENOUS; SUBCUTANEOUS at 08:48

## 2022-07-28 RX ADMIN — ALPRAZOLAM 0.25 MG: 0.5 TABLET ORAL at 04:54

## 2022-07-28 RX ADMIN — INSULIN LISPRO 9 UNITS: 100 INJECTION, SOLUTION INTRAVENOUS; SUBCUTANEOUS at 10:02

## 2022-07-28 RX ADMIN — VANCOMYCIN HYDROCHLORIDE 1250 MG: 1.25 INJECTION, POWDER, LYOPHILIZED, FOR SOLUTION INTRAVENOUS at 01:10

## 2022-07-28 RX ADMIN — INSULIN LISPRO 5 UNITS: 100 INJECTION, SOLUTION INTRAVENOUS; SUBCUTANEOUS at 13:10

## 2022-07-28 RX ADMIN — RIVAROXABAN 20 MG: 20 TABLET, FILM COATED ORAL at 17:17

## 2022-07-28 RX ADMIN — OXYCODONE 10 MG: 5 TABLET ORAL at 06:42

## 2022-07-28 RX ADMIN — GUAIFENESIN 600 MG: 600 TABLET, EXTENDED RELEASE ORAL at 21:09

## 2022-07-28 RX ADMIN — METOPROLOL SUCCINATE 25 MG: 25 TABLET, EXTENDED RELEASE ORAL at 13:01

## 2022-07-28 RX ADMIN — INSULIN LISPRO 5 UNITS: 100 INJECTION, SOLUTION INTRAVENOUS; SUBCUTANEOUS at 17:52

## 2022-07-28 RX ADMIN — HYDROCODONE BITARTRATE AND ACETAMINOPHEN 1 TABLET: 10; 325 TABLET ORAL at 01:49

## 2022-07-28 RX ADMIN — AZITHROMYCIN MONOHYDRATE 500 MG: 500 INJECTION, POWDER, LYOPHILIZED, FOR SOLUTION INTRAVENOUS at 04:00

## 2022-07-28 RX ADMIN — FOLIC ACID 1 MG: 1 TABLET ORAL at 16:31

## 2022-07-28 RX ADMIN — OXYCODONE 10 MG: 5 TABLET ORAL at 22:19

## 2022-07-28 RX ADMIN — ALPRAZOLAM 0.25 MG: 0.5 TABLET ORAL at 22:18

## 2022-07-28 RX ADMIN — TRAZODONE HYDROCHLORIDE 50 MG: 50 TABLET ORAL at 23:36

## 2022-07-28 RX ADMIN — CITALOPRAM HYDROBROMIDE 10 MG: 20 TABLET ORAL at 13:18

## 2022-07-28 RX ADMIN — METHYLPREDNISOLONE SODIUM SUCCINATE 40 MG: 40 INJECTION, POWDER, FOR SOLUTION INTRAMUSCULAR; INTRAVENOUS at 13:11

## 2022-07-28 RX ADMIN — Medication 10 ML: at 22:21

## 2022-07-28 RX ADMIN — SODIUM CHLORIDE, POTASSIUM CHLORIDE, SODIUM LACTATE AND CALCIUM CHLORIDE 125 ML/HR: 600; 310; 30; 20 INJECTION, SOLUTION INTRAVENOUS at 05:58

## 2022-07-28 NOTE — OUTREACH NOTE
CHF Week 3 Survey    Flowsheet Row Responses   [facility] patient discharged [facility]? [facility]   Does the patient have one of the following disease processes/diagnoses(primary or secondary)? Other   Week 3 attempt successful? No   Revoke Readmitted          GONZALO WEBB - Registered Nurse

## 2022-07-29 LAB
ANISOCYTOSIS BLD QL: ABNORMAL
BASOPHILS # BLD MANUAL: 19.23 10*3/MM3 (ref 0–0.2)
BASOPHILS NFR BLD MANUAL: 10 % (ref 0–1.5)
BLASTS NFR BLD MANUAL: 12 % (ref 0–0)
DEPRECATED RDW RBC AUTO: 65.2 FL (ref 37–54)
EOSINOPHIL # BLD MANUAL: 21.15 10*3/MM3 (ref 0–0.4)
EOSINOPHIL NFR BLD MANUAL: 11 % (ref 0.3–6.2)
ERYTHROCYTE [DISTWIDTH] IN BLOOD BY AUTOMATED COUNT: 22.6 % (ref 12.3–15.4)
GLUCOSE BLDC GLUCOMTR-MCNC: 346 MG/DL (ref 70–105)
GLUCOSE BLDC GLUCOMTR-MCNC: 393 MG/DL (ref 70–105)
GLUCOSE BLDC GLUCOMTR-MCNC: 430 MG/DL (ref 70–105)
GLUCOSE BLDC GLUCOMTR-MCNC: 483 MG/DL (ref 70–105)
GLUCOSE BLDC GLUCOMTR-MCNC: 560 MG/DL (ref 70–105)
HCT VFR BLD AUTO: 23.9 % (ref 34–46.6)
HGB BLD-MCNC: 7 G/DL (ref 12–15.9)
L PNEUMO1 AG UR QL IA: NEGATIVE
LYMPHOCYTES # BLD MANUAL: 11.54 10*3/MM3 (ref 0.7–3.1)
LYMPHOCYTES NFR BLD MANUAL: 2 % (ref 5–12)
MCH RBC QN AUTO: 24.8 PG (ref 26.6–33)
MCHC RBC AUTO-ENTMCNC: 29.5 G/DL (ref 31.5–35.7)
MCV RBC AUTO: 84 FL (ref 79–97)
METAMYELOCYTES NFR BLD MANUAL: 8 % (ref 0–0)
MICROCYTES BLD QL: ABNORMAL
MONOCYTES # BLD: 3.85 10*3/MM3 (ref 0.1–0.9)
MYELOCYTES NFR BLD MANUAL: 4 % (ref 0–0)
NEUTROPHILS # BLD AUTO: 61.54 10*3/MM3 (ref 1.7–7)
NEUTROPHILS NFR BLD MANUAL: 29 % (ref 42.7–76)
NEUTS BAND NFR BLD MANUAL: 3 % (ref 0–5)
NRBC SPEC MANUAL: 1 /100 WBC (ref 0–0.2)
PLAT MORPH BLD: NORMAL
PLATELET # BLD AUTO: 199 10*3/MM3 (ref 140–450)
PMV BLD AUTO: 7.3 FL (ref 6–12)
POLYCHROMASIA BLD QL SMEAR: ABNORMAL
PROMYELOCYTES NFR BLD MANUAL: 15 % (ref 0–0)
RBC # BLD AUTO: 2.84 10*6/MM3 (ref 3.77–5.28)
S PNEUM AG SPEC QL LA: NEGATIVE
SCAN SLIDE: NORMAL
VARIANT LYMPHS NFR BLD MANUAL: 6 % (ref 19.6–45.3)
WBC MORPH BLD: NORMAL
WBC NRBC COR # BLD: 192.3 10*3/MM3 (ref 3.4–10.8)

## 2022-07-29 PROCEDURE — 94664 DEMO&/EVAL PT USE INHALER: CPT

## 2022-07-29 PROCEDURE — 85025 COMPLETE CBC W/AUTO DIFF WBC: CPT | Performed by: NURSE PRACTITIONER

## 2022-07-29 PROCEDURE — 85007 BL SMEAR W/DIFF WBC COUNT: CPT | Performed by: NURSE PRACTITIONER

## 2022-07-29 PROCEDURE — 87449 NOS EACH ORGANISM AG IA: CPT | Performed by: HOSPITALIST

## 2022-07-29 PROCEDURE — 25010000002 AZITHROMYCIN PER 500 MG: Performed by: NURSE PRACTITIONER

## 2022-07-29 PROCEDURE — 25010000002 FUROSEMIDE PER 20 MG: Performed by: HOSPITALIST

## 2022-07-29 PROCEDURE — 94799 UNLISTED PULMONARY SVC/PX: CPT

## 2022-07-29 PROCEDURE — 25010000002 METHYLPREDNISOLONE PER 40 MG: Performed by: INTERNAL MEDICINE

## 2022-07-29 PROCEDURE — 25010000002 CEFEPIME PER 500 MG: Performed by: INTERNAL MEDICINE

## 2022-07-29 PROCEDURE — 99232 SBSQ HOSP IP/OBS MODERATE 35: CPT | Performed by: HOSPITALIST

## 2022-07-29 PROCEDURE — 94761 N-INVAS EAR/PLS OXIMETRY MLT: CPT

## 2022-07-29 PROCEDURE — 36415 COLL VENOUS BLD VENIPUNCTURE: CPT | Performed by: NURSE PRACTITIONER

## 2022-07-29 PROCEDURE — 63710000001 INSULIN GLARGINE PER 5 UNITS: Performed by: INTERNAL MEDICINE

## 2022-07-29 PROCEDURE — 99233 SBSQ HOSP IP/OBS HIGH 50: CPT | Performed by: INTERNAL MEDICINE

## 2022-07-29 PROCEDURE — 82962 GLUCOSE BLOOD TEST: CPT

## 2022-07-29 PROCEDURE — 63710000001 INSULIN LISPRO (HUMAN) PER 5 UNITS: Performed by: HOSPITALIST

## 2022-07-29 PROCEDURE — 63710000001 INSULIN REGULAR HUMAN PER 5 UNITS: Performed by: HOSPITALIST

## 2022-07-29 PROCEDURE — 87899 AGENT NOS ASSAY W/OPTIC: CPT | Performed by: HOSPITALIST

## 2022-07-29 PROCEDURE — 63710000001 INSULIN LISPRO (HUMAN) PER 5 UNITS: Performed by: INTERNAL MEDICINE

## 2022-07-29 RX ORDER — ALLOPURINOL 300 MG/1
300 TABLET ORAL DAILY
Status: DISCONTINUED | OUTPATIENT
Start: 2022-07-30 | End: 2022-08-05 | Stop reason: HOSPADM

## 2022-07-29 RX ORDER — INSULIN LISPRO 100 [IU]/ML
0-14 INJECTION, SOLUTION INTRAVENOUS; SUBCUTANEOUS AS NEEDED
Status: DISCONTINUED | OUTPATIENT
Start: 2022-07-29 | End: 2022-08-02

## 2022-07-29 RX ORDER — INSULIN LISPRO 100 [IU]/ML
0-14 INJECTION, SOLUTION INTRAVENOUS; SUBCUTANEOUS
Status: DISCONTINUED | OUTPATIENT
Start: 2022-07-29 | End: 2022-08-02

## 2022-07-29 RX ORDER — AZITHROMYCIN 250 MG/1
500 TABLET, FILM COATED ORAL
Status: DISCONTINUED | OUTPATIENT
Start: 2022-07-30 | End: 2022-07-30

## 2022-07-29 RX ORDER — ALLOPURINOL 100 MG/1
200 TABLET ORAL ONCE
Status: COMPLETED | OUTPATIENT
Start: 2022-07-29 | End: 2022-07-29

## 2022-07-29 RX ORDER — FUROSEMIDE 10 MG/ML
40 INJECTION INTRAMUSCULAR; INTRAVENOUS ONCE
Status: COMPLETED | OUTPATIENT
Start: 2022-07-29 | End: 2022-07-29

## 2022-07-29 RX ADMIN — METHYLPREDNISOLONE SODIUM SUCCINATE 40 MG: 40 INJECTION, POWDER, FOR SOLUTION INTRAMUSCULAR; INTRAVENOUS at 01:14

## 2022-07-29 RX ADMIN — HYDROXYUREA 1000 MG: 500 CAPSULE ORAL at 00:25

## 2022-07-29 RX ADMIN — Medication 10 ML: at 22:01

## 2022-07-29 RX ADMIN — ALLOPURINOL 200 MG: 100 TABLET ORAL at 16:21

## 2022-07-29 RX ADMIN — GUAIFENESIN 600 MG: 600 TABLET, EXTENDED RELEASE ORAL at 21:58

## 2022-07-29 RX ADMIN — CITALOPRAM HYDROBROMIDE 10 MG: 20 TABLET ORAL at 09:40

## 2022-07-29 RX ADMIN — INSULIN HUMAN 4 UNITS: 100 INJECTION, SOLUTION PARENTERAL at 09:57

## 2022-07-29 RX ADMIN — AZITHROMYCIN MONOHYDRATE 500 MG: 500 INJECTION, POWDER, LYOPHILIZED, FOR SOLUTION INTRAVENOUS at 01:14

## 2022-07-29 RX ADMIN — MIRTAZAPINE 15 MG: 15 TABLET, FILM COATED ORAL at 22:00

## 2022-07-29 RX ADMIN — Medication 10 ML: at 09:41

## 2022-07-29 RX ADMIN — GABAPENTIN 300 MG: 300 CAPSULE ORAL at 16:21

## 2022-07-29 RX ADMIN — SODIUM CHLORIDE, POTASSIUM CHLORIDE, SODIUM LACTATE AND CALCIUM CHLORIDE 125 ML/HR: 600; 310; 30; 20 INJECTION, SOLUTION INTRAVENOUS at 02:28

## 2022-07-29 RX ADMIN — METHYLPREDNISOLONE SODIUM SUCCINATE 40 MG: 40 INJECTION, POWDER, FOR SOLUTION INTRAMUSCULAR; INTRAVENOUS at 09:41

## 2022-07-29 RX ADMIN — METOPROLOL SUCCINATE 25 MG: 25 TABLET, EXTENDED RELEASE ORAL at 09:40

## 2022-07-29 RX ADMIN — TRAZODONE HYDROCHLORIDE 50 MG: 50 TABLET ORAL at 22:01

## 2022-07-29 RX ADMIN — HYDROXYUREA 1000 MG: 500 CAPSULE ORAL at 21:59

## 2022-07-29 RX ADMIN — CEFEPIME HYDROCHLORIDE 2 G: 2 INJECTION, POWDER, FOR SOLUTION INTRAVENOUS at 02:27

## 2022-07-29 RX ADMIN — INSULIN LISPRO 5 UNITS: 100 INJECTION, SOLUTION INTRAVENOUS; SUBCUTANEOUS at 17:16

## 2022-07-29 RX ADMIN — IPRATROPIUM BROMIDE AND ALBUTEROL SULFATE 3 ML: .5; 3 SOLUTION RESPIRATORY (INHALATION) at 20:53

## 2022-07-29 RX ADMIN — FOLIC ACID 1 MG: 1 TABLET ORAL at 09:39

## 2022-07-29 RX ADMIN — IPRATROPIUM BROMIDE AND ALBUTEROL SULFATE 3 ML: .5; 3 SOLUTION RESPIRATORY (INHALATION) at 11:12

## 2022-07-29 RX ADMIN — HYDROCODONE BITARTRATE AND ACETAMINOPHEN 1 TABLET: 10; 325 TABLET ORAL at 11:52

## 2022-07-29 RX ADMIN — INSULIN LISPRO 10 UNITS: 100 INJECTION, SOLUTION INTRAVENOUS; SUBCUTANEOUS at 17:16

## 2022-07-29 RX ADMIN — GABAPENTIN 300 MG: 300 CAPSULE ORAL at 21:58

## 2022-07-29 RX ADMIN — HYDROXYUREA 1000 MG: 500 CAPSULE ORAL at 09:43

## 2022-07-29 RX ADMIN — ALLOPURINOL 100 MG: 100 TABLET ORAL at 09:40

## 2022-07-29 RX ADMIN — INSULIN LISPRO 9 UNITS: 100 INJECTION, SOLUTION INTRAVENOUS; SUBCUTANEOUS at 09:42

## 2022-07-29 RX ADMIN — GUAIFENESIN 600 MG: 600 TABLET, EXTENDED RELEASE ORAL at 09:40

## 2022-07-29 RX ADMIN — INSULIN GLARGINE 20 UNITS: 100 INJECTION, SOLUTION SUBCUTANEOUS at 22:00

## 2022-07-29 RX ADMIN — OXYCODONE 10 MG: 5 TABLET ORAL at 22:06

## 2022-07-29 RX ADMIN — GABAPENTIN 300 MG: 300 CAPSULE ORAL at 09:40

## 2022-07-29 RX ADMIN — INSULIN LISPRO 5 UNITS: 100 INJECTION, SOLUTION INTRAVENOUS; SUBCUTANEOUS at 07:26

## 2022-07-29 RX ADMIN — INSULIN LISPRO 5 UNITS: 100 INJECTION, SOLUTION INTRAVENOUS; SUBCUTANEOUS at 11:52

## 2022-07-29 RX ADMIN — INSULIN LISPRO 14 UNITS: 100 INJECTION, SOLUTION INTRAVENOUS; SUBCUTANEOUS at 22:02

## 2022-07-29 RX ADMIN — METHYLPREDNISOLONE SODIUM SUCCINATE 40 MG: 40 INJECTION, POWDER, FOR SOLUTION INTRAMUSCULAR; INTRAVENOUS at 17:15

## 2022-07-29 RX ADMIN — FUROSEMIDE 40 MG: 10 INJECTION, SOLUTION INTRAMUSCULAR; INTRAVENOUS at 09:41

## 2022-07-29 RX ADMIN — IPRATROPIUM BROMIDE AND ALBUTEROL SULFATE 3 ML: .5; 3 SOLUTION RESPIRATORY (INHALATION) at 15:42

## 2022-07-29 RX ADMIN — LOSARTAN POTASSIUM 25 MG: 25 TABLET, FILM COATED ORAL at 09:39

## 2022-07-29 RX ADMIN — INSULIN LISPRO 9 UNITS: 100 INJECTION, SOLUTION INTRAVENOUS; SUBCUTANEOUS at 07:25

## 2022-07-29 RX ADMIN — CEFEPIME HYDROCHLORIDE 2 G: 2 INJECTION, POWDER, FOR SOLUTION INTRAVENOUS at 16:21

## 2022-07-29 RX ADMIN — INSULIN HUMAN 4 UNITS: 100 INJECTION, SOLUTION PARENTERAL at 17:16

## 2022-07-29 RX ADMIN — INSULIN LISPRO 12 UNITS: 100 INJECTION, SOLUTION INTRAVENOUS; SUBCUTANEOUS at 11:52

## 2022-07-29 RX ADMIN — RIVAROXABAN 20 MG: 20 TABLET, FILM COATED ORAL at 17:15

## 2022-07-29 RX ADMIN — CEFEPIME HYDROCHLORIDE 2 G: 2 INJECTION, POWDER, FOR SOLUTION INTRAVENOUS at 09:41

## 2022-07-30 LAB
ABO GROUP BLD: NORMAL
ALBUMIN SERPL-MCNC: 3.6 G/DL (ref 3.5–5.2)
ALBUMIN/GLOB SERPL: 1.2 G/DL
ALP SERPL-CCNC: 644 U/L (ref 39–117)
ALT SERPL W P-5'-P-CCNC: 20 U/L (ref 1–33)
ANION GAP SERPL CALCULATED.3IONS-SCNC: 10 MMOL/L (ref 5–15)
ANISOCYTOSIS BLD QL: ABNORMAL
ANTI-E: NORMAL
ANTI-JKA: NORMAL
ANTI-S: NORMAL
AST SERPL-CCNC: 12 U/L (ref 1–32)
BASOPHILS # BLD MANUAL: 13.2 10*3/MM3 (ref 0–0.2)
BASOPHILS NFR BLD MANUAL: 10 % (ref 0–1.5)
BILIRUB SERPL-MCNC: 0.8 MG/DL (ref 0–1.2)
BLASTS NFR BLD MANUAL: 12 % (ref 0–0)
BLD GP AB SCN SERPL QL: POSITIVE
BUN SERPL-MCNC: 26 MG/DL (ref 6–20)
BUN/CREAT SERPL: 46.4 (ref 7–25)
CALCIUM SPEC-SCNC: 9 MG/DL (ref 8.6–10.5)
CHLORIDE SERPL-SCNC: 100 MMOL/L (ref 98–107)
CO2 SERPL-SCNC: 27 MMOL/L (ref 22–29)
CREAT SERPL-MCNC: 0.56 MG/DL (ref 0.57–1)
DAT POLY-SP REAG RBC QL: NEGATIVE
DEPRECATED RDW RBC AUTO: 64.8 FL (ref 37–54)
EGFRCR SERPLBLD CKD-EPI 2021: 114.1 ML/MIN/1.73
EOSINOPHIL # BLD MANUAL: 7.92 10*3/MM3 (ref 0–0.4)
EOSINOPHIL NFR BLD MANUAL: 6 % (ref 0.3–6.2)
ERYTHROCYTE [DISTWIDTH] IN BLOOD BY AUTOMATED COUNT: 22.5 % (ref 12.3–15.4)
GLOBULIN UR ELPH-MCNC: 3.1 GM/DL
GLUCOSE BLDC GLUCOMTR-MCNC: 228 MG/DL (ref 70–105)
GLUCOSE BLDC GLUCOMTR-MCNC: 258 MG/DL (ref 70–105)
GLUCOSE BLDC GLUCOMTR-MCNC: 290 MG/DL (ref 70–105)
GLUCOSE BLDC GLUCOMTR-MCNC: 310 MG/DL (ref 70–105)
GLUCOSE BLDC GLUCOMTR-MCNC: 377 MG/DL (ref 70–105)
GLUCOSE BLDC GLUCOMTR-MCNC: 534 MG/DL (ref 70–105)
GLUCOSE SERPL-MCNC: 307 MG/DL (ref 65–99)
HCT VFR BLD AUTO: 23 % (ref 34–46.6)
HGB BLD-MCNC: 6.7 G/DL (ref 12–15.9)
LARGE PLATELETS: ABNORMAL
LYMPHOCYTES # BLD MANUAL: 3.96 10*3/MM3 (ref 0.7–3.1)
MAGNESIUM SERPL-MCNC: 2 MG/DL (ref 1.6–2.6)
MCH RBC QN AUTO: 24 PG (ref 26.6–33)
MCHC RBC AUTO-ENTMCNC: 29.2 G/DL (ref 31.5–35.7)
MCV RBC AUTO: 82 FL (ref 79–97)
METAMYELOCYTES NFR BLD MANUAL: 8 % (ref 0–0)
MICROCYTES BLD QL: ABNORMAL
MYELOCYTES NFR BLD MANUAL: 4 % (ref 0–0)
NEUTROPHILS # BLD AUTO: 69.96 10*3/MM3 (ref 1.7–7)
NEUTROPHILS NFR BLD MANUAL: 45 % (ref 42.7–76)
NEUTS BAND NFR BLD MANUAL: 8 % (ref 0–5)
PHOSPHATE SERPL-MCNC: 2.8 MG/DL (ref 2.5–4.5)
PLATELET # BLD AUTO: 160 10*3/MM3 (ref 140–450)
PMV BLD AUTO: 7.1 FL (ref 6–12)
POLYCHROMASIA BLD QL SMEAR: ABNORMAL
POTASSIUM SERPL-SCNC: 3.6 MMOL/L (ref 3.5–5.2)
PROCALCITONIN SERPL-MCNC: 0.21 NG/ML (ref 0–0.25)
PROMYELOCYTES NFR BLD MANUAL: 4 % (ref 0–0)
PROT SERPL-MCNC: 6.7 G/DL (ref 6–8.5)
RBC # BLD AUTO: 2.8 10*6/MM3 (ref 3.77–5.28)
RH BLD: POSITIVE
SCAN SLIDE: NORMAL
SODIUM SERPL-SCNC: 137 MMOL/L (ref 136–145)
T&S EXPIRATION DATE: NORMAL
URATE SERPL-MCNC: 6.6 MG/DL (ref 2.4–5.7)
VARIANT LYMPHS NFR BLD MANUAL: 3 % (ref 19.6–45.3)
WBC MORPH BLD: NORMAL
WBC NRBC COR # BLD: 132 10*3/MM3 (ref 3.4–10.8)

## 2022-07-30 PROCEDURE — 63710000001 INSULIN LISPRO (HUMAN) PER 5 UNITS: Performed by: HOSPITALIST

## 2022-07-30 PROCEDURE — 84550 ASSAY OF BLOOD/URIC ACID: CPT | Performed by: HOSPITALIST

## 2022-07-30 PROCEDURE — 86922 COMPATIBILITY TEST ANTIGLOB: CPT

## 2022-07-30 PROCEDURE — 99232 SBSQ HOSP IP/OBS MODERATE 35: CPT | Performed by: INTERNAL MEDICINE

## 2022-07-30 PROCEDURE — 63710000001 INSULIN GLARGINE PER 5 UNITS: Performed by: INTERNAL MEDICINE

## 2022-07-30 PROCEDURE — P9016 RBC LEUKOCYTES REDUCED: HCPCS

## 2022-07-30 PROCEDURE — 86900 BLOOD TYPING SEROLOGIC ABO: CPT | Performed by: INTERNAL MEDICINE

## 2022-07-30 PROCEDURE — 86901 BLOOD TYPING SEROLOGIC RH(D): CPT | Performed by: INTERNAL MEDICINE

## 2022-07-30 PROCEDURE — 84100 ASSAY OF PHOSPHORUS: CPT | Performed by: HOSPITALIST

## 2022-07-30 PROCEDURE — 86902 BLOOD TYPE ANTIGEN DONOR EA: CPT

## 2022-07-30 PROCEDURE — 63710000001 INSULIN REGULAR HUMAN PER 5 UNITS: Performed by: HOSPITALIST

## 2022-07-30 PROCEDURE — 86850 RBC ANTIBODY SCREEN: CPT | Performed by: INTERNAL MEDICINE

## 2022-07-30 PROCEDURE — 25010000002 METHYLPREDNISOLONE PER 40 MG: Performed by: INTERNAL MEDICINE

## 2022-07-30 PROCEDURE — 63710000001 INSULIN LISPRO (HUMAN) PER 5 UNITS: Performed by: INTERNAL MEDICINE

## 2022-07-30 PROCEDURE — 63710000001 DIPHENHYDRAMINE PER 50 MG: Performed by: INTERNAL MEDICINE

## 2022-07-30 PROCEDURE — 86870 RBC ANTIBODY IDENTIFICATION: CPT | Performed by: INTERNAL MEDICINE

## 2022-07-30 PROCEDURE — 83735 ASSAY OF MAGNESIUM: CPT | Performed by: HOSPITALIST

## 2022-07-30 PROCEDURE — 99232 SBSQ HOSP IP/OBS MODERATE 35: CPT | Performed by: HOSPITALIST

## 2022-07-30 PROCEDURE — 85025 COMPLETE CBC W/AUTO DIFF WBC: CPT | Performed by: NURSE PRACTITIONER

## 2022-07-30 PROCEDURE — 25010000002 CEFEPIME PER 500 MG: Performed by: INTERNAL MEDICINE

## 2022-07-30 PROCEDURE — 86900 BLOOD TYPING SEROLOGIC ABO: CPT

## 2022-07-30 PROCEDURE — 63710000001 INSULIN REGULAR HUMAN PER 5 UNITS: Performed by: INTERNAL MEDICINE

## 2022-07-30 PROCEDURE — 94799 UNLISTED PULMONARY SVC/PX: CPT

## 2022-07-30 PROCEDURE — 36430 TRANSFUSION BLD/BLD COMPNT: CPT

## 2022-07-30 PROCEDURE — 80053 COMPREHEN METABOLIC PANEL: CPT | Performed by: HOSPITALIST

## 2022-07-30 PROCEDURE — 86880 COOMBS TEST DIRECT: CPT | Performed by: INTERNAL MEDICINE

## 2022-07-30 PROCEDURE — 84145 PROCALCITONIN (PCT): CPT | Performed by: INTERNAL MEDICINE

## 2022-07-30 PROCEDURE — 85007 BL SMEAR W/DIFF WBC COUNT: CPT | Performed by: NURSE PRACTITIONER

## 2022-07-30 PROCEDURE — 82962 GLUCOSE BLOOD TEST: CPT

## 2022-07-30 RX ORDER — DIPHENHYDRAMINE HYDROCHLORIDE 50 MG/ML
25 INJECTION INTRAMUSCULAR; INTRAVENOUS ONCE
Status: COMPLETED | OUTPATIENT
Start: 2022-07-30 | End: 2022-07-30

## 2022-07-30 RX ORDER — DIPHENHYDRAMINE HCL 25 MG
25 CAPSULE ORAL ONCE
Status: COMPLETED | OUTPATIENT
Start: 2022-07-30 | End: 2022-07-30

## 2022-07-30 RX ORDER — ACETAMINOPHEN 650 MG/1
650 SUPPOSITORY RECTAL ONCE
Status: COMPLETED | OUTPATIENT
Start: 2022-07-30 | End: 2022-07-30

## 2022-07-30 RX ORDER — ACETAMINOPHEN 325 MG/1
650 TABLET ORAL ONCE
Status: COMPLETED | OUTPATIENT
Start: 2022-07-30 | End: 2022-07-30

## 2022-07-30 RX ORDER — PREDNISONE 20 MG/1
40 TABLET ORAL
Status: DISCONTINUED | OUTPATIENT
Start: 2022-07-31 | End: 2022-08-02

## 2022-07-30 RX ORDER — ACETAMINOPHEN 160 MG/5ML
650 SOLUTION ORAL ONCE
Status: COMPLETED | OUTPATIENT
Start: 2022-07-30 | End: 2022-07-30

## 2022-07-30 RX ORDER — SODIUM CHLORIDE 9 MG/ML
50 INJECTION, SOLUTION INTRAVENOUS CONTINUOUS
Status: DISPENSED | OUTPATIENT
Start: 2022-07-30 | End: 2022-07-30

## 2022-07-30 RX ADMIN — INSULIN GLARGINE 20 UNITS: 100 INJECTION, SOLUTION SUBCUTANEOUS at 22:11

## 2022-07-30 RX ADMIN — CEFEPIME HYDROCHLORIDE 2 G: 2 INJECTION, POWDER, FOR SOLUTION INTRAVENOUS at 00:31

## 2022-07-30 RX ADMIN — HYDROCODONE BITARTRATE AND ACETAMINOPHEN 1 TABLET: 10; 325 TABLET ORAL at 18:06

## 2022-07-30 RX ADMIN — CEFEPIME HYDROCHLORIDE 2 G: 2 INJECTION, POWDER, FOR SOLUTION INTRAVENOUS at 17:54

## 2022-07-30 RX ADMIN — INSULIN LISPRO 10 UNITS: 100 INJECTION, SOLUTION INTRAVENOUS; SUBCUTANEOUS at 13:28

## 2022-07-30 RX ADMIN — Medication 10 ML: at 09:23

## 2022-07-30 RX ADMIN — OXYCODONE 10 MG: 5 TABLET ORAL at 09:19

## 2022-07-30 RX ADMIN — INSULIN LISPRO 5 UNITS: 100 INJECTION, SOLUTION INTRAVENOUS; SUBCUTANEOUS at 17:56

## 2022-07-30 RX ADMIN — ACETAMINOPHEN 650 MG: 325 TABLET, FILM COATED ORAL at 07:28

## 2022-07-30 RX ADMIN — GABAPENTIN 300 MG: 300 CAPSULE ORAL at 17:54

## 2022-07-30 RX ADMIN — INSULIN HUMAN 10 UNITS: 100 INJECTION, SOLUTION PARENTERAL at 02:17

## 2022-07-30 RX ADMIN — GABAPENTIN 300 MG: 300 CAPSULE ORAL at 22:08

## 2022-07-30 RX ADMIN — GUAIFENESIN 600 MG: 600 TABLET, EXTENDED RELEASE ORAL at 09:22

## 2022-07-30 RX ADMIN — INSULIN LISPRO 8 UNITS: 100 INJECTION, SOLUTION INTRAVENOUS; SUBCUTANEOUS at 09:32

## 2022-07-30 RX ADMIN — IPRATROPIUM BROMIDE AND ALBUTEROL SULFATE 3 ML: .5; 3 SOLUTION RESPIRATORY (INHALATION) at 09:09

## 2022-07-30 RX ADMIN — METOPROLOL SUCCINATE 25 MG: 25 TABLET, EXTENDED RELEASE ORAL at 09:21

## 2022-07-30 RX ADMIN — FOLIC ACID 1 MG: 1 TABLET ORAL at 09:20

## 2022-07-30 RX ADMIN — RIVAROXABAN 20 MG: 20 TABLET, FILM COATED ORAL at 17:54

## 2022-07-30 RX ADMIN — GUAIFENESIN 600 MG: 600 TABLET, EXTENDED RELEASE ORAL at 22:08

## 2022-07-30 RX ADMIN — INSULIN LISPRO 5 UNITS: 100 INJECTION, SOLUTION INTRAVENOUS; SUBCUTANEOUS at 13:29

## 2022-07-30 RX ADMIN — INSULIN LISPRO 8 UNITS: 100 INJECTION, SOLUTION INTRAVENOUS; SUBCUTANEOUS at 17:56

## 2022-07-30 RX ADMIN — GABAPENTIN 300 MG: 300 CAPSULE ORAL at 09:21

## 2022-07-30 RX ADMIN — IPRATROPIUM BROMIDE AND ALBUTEROL SULFATE 3 ML: .5; 3 SOLUTION RESPIRATORY (INHALATION) at 12:50

## 2022-07-30 RX ADMIN — METHYLPREDNISOLONE SODIUM SUCCINATE 40 MG: 40 INJECTION, POWDER, FOR SOLUTION INTRAMUSCULAR; INTRAVENOUS at 02:18

## 2022-07-30 RX ADMIN — SODIUM CHLORIDE 50 ML/HR: 9 INJECTION, SOLUTION INTRAVENOUS at 02:16

## 2022-07-30 RX ADMIN — LOSARTAN POTASSIUM 25 MG: 25 TABLET, FILM COATED ORAL at 09:20

## 2022-07-30 RX ADMIN — MIRTAZAPINE 15 MG: 15 TABLET, FILM COATED ORAL at 22:12

## 2022-07-30 RX ADMIN — IPRATROPIUM BROMIDE AND ALBUTEROL SULFATE 3 ML: .5; 3 SOLUTION RESPIRATORY (INHALATION) at 16:10

## 2022-07-30 RX ADMIN — DIPHENHYDRAMINE HYDROCHLORIDE 25 MG: 25 CAPSULE ORAL at 10:34

## 2022-07-30 RX ADMIN — OXYCODONE 10 MG: 5 TABLET ORAL at 22:13

## 2022-07-30 RX ADMIN — HYDROXYUREA 1000 MG: 500 CAPSULE ORAL at 22:08

## 2022-07-30 RX ADMIN — INSULIN HUMAN 4 UNITS: 100 INJECTION, SOLUTION PARENTERAL at 02:18

## 2022-07-30 RX ADMIN — CEFEPIME HYDROCHLORIDE 2 G: 2 INJECTION, POWDER, FOR SOLUTION INTRAVENOUS at 09:30

## 2022-07-30 RX ADMIN — TRAZODONE HYDROCHLORIDE 50 MG: 50 TABLET ORAL at 22:13

## 2022-07-30 RX ADMIN — ALLOPURINOL 300 MG: 300 TABLET ORAL at 09:20

## 2022-07-30 RX ADMIN — CITALOPRAM HYDROBROMIDE 10 MG: 20 TABLET ORAL at 09:21

## 2022-07-30 RX ADMIN — Medication 10 ML: at 22:12

## 2022-07-30 RX ADMIN — HYDROXYUREA 1000 MG: 500 CAPSULE ORAL at 09:29

## 2022-07-30 RX ADMIN — INSULIN LISPRO 5 UNITS: 100 INJECTION, SOLUTION INTRAVENOUS; SUBCUTANEOUS at 09:23

## 2022-07-30 RX ADMIN — INSULIN LISPRO 14 UNITS: 100 INJECTION, SOLUTION INTRAVENOUS; SUBCUTANEOUS at 00:31

## 2022-07-31 LAB
ALBUMIN SERPL-MCNC: 3.9 G/DL (ref 3.5–5.2)
ALBUMIN/GLOB SERPL: 1.3 G/DL
ALP SERPL-CCNC: 689 U/L (ref 39–117)
ALT SERPL W P-5'-P-CCNC: 34 U/L (ref 1–33)
ANION GAP SERPL CALCULATED.3IONS-SCNC: 12 MMOL/L (ref 5–15)
ANISOCYTOSIS BLD QL: ABNORMAL
AST SERPL-CCNC: 29 U/L (ref 1–32)
BASOPHILS # BLD MANUAL: 12.2 10*3/MM3 (ref 0–0.2)
BASOPHILS NFR BLD MANUAL: 9 % (ref 0–1.5)
BH BB BLOOD EXPIRATION DATE: NORMAL
BH BB BLOOD TYPE BARCODE: 6200
BH BB DISPENSE STATUS: NORMAL
BH BB PRODUCT CODE: NORMAL
BH BB UNIT NUMBER: NORMAL
BILIRUB SERPL-MCNC: 1.4 MG/DL (ref 0–1.2)
BLASTS NFR BLD MANUAL: 14 % (ref 0–0)
BUN SERPL-MCNC: 25 MG/DL (ref 6–20)
BUN/CREAT SERPL: 41.7 (ref 7–25)
CALCIUM SPEC-SCNC: 9 MG/DL (ref 8.6–10.5)
CHLORIDE SERPL-SCNC: 101 MMOL/L (ref 98–107)
CO2 SERPL-SCNC: 26 MMOL/L (ref 22–29)
CREAT SERPL-MCNC: 0.6 MG/DL (ref 0.57–1)
CROSSMATCH INTERPRETATION: NORMAL
DEPRECATED RDW RBC AUTO: 62.6 FL (ref 37–54)
EGFRCR SERPLBLD CKD-EPI 2021: 112.3 ML/MIN/1.73
EOSINOPHIL # BLD MANUAL: 5.42 10*3/MM3 (ref 0–0.4)
EOSINOPHIL NFR BLD MANUAL: 4 % (ref 0.3–6.2)
ERYTHROCYTE [DISTWIDTH] IN BLOOD BY AUTOMATED COUNT: 21.5 % (ref 12.3–15.4)
GLOBULIN UR ELPH-MCNC: 3.1 GM/DL
GLUCOSE BLDC GLUCOMTR-MCNC: 132 MG/DL (ref 70–105)
GLUCOSE BLDC GLUCOMTR-MCNC: 145 MG/DL (ref 70–105)
GLUCOSE BLDC GLUCOMTR-MCNC: 359 MG/DL (ref 70–105)
GLUCOSE BLDC GLUCOMTR-MCNC: 449 MG/DL (ref 70–105)
GLUCOSE BLDC GLUCOMTR-MCNC: 490 MG/DL (ref 70–105)
GLUCOSE BLDC GLUCOMTR-MCNC: 526 MG/DL (ref 70–105)
GLUCOSE BLDC GLUCOMTR-MCNC: 559 MG/DL (ref 70–105)
GLUCOSE SERPL-MCNC: 261 MG/DL (ref 65–99)
HCT VFR BLD AUTO: 29 % (ref 34–46.6)
HGB BLD-MCNC: 8.4 G/DL (ref 12–15.9)
LARGE PLATELETS: ABNORMAL
LYMPHOCYTES # BLD MANUAL: 2.71 10*3/MM3 (ref 0.7–3.1)
LYMPHOCYTES NFR BLD MANUAL: 4 % (ref 5–12)
MAGNESIUM SERPL-MCNC: 2.1 MG/DL (ref 1.6–2.6)
MCH RBC QN AUTO: 24.3 PG (ref 26.6–33)
MCHC RBC AUTO-ENTMCNC: 29.1 G/DL (ref 31.5–35.7)
MCV RBC AUTO: 83.6 FL (ref 79–97)
METAMYELOCYTES NFR BLD MANUAL: 6 % (ref 0–0)
MICROCYTES BLD QL: ABNORMAL
MONOCYTES # BLD: 5.42 10*3/MM3 (ref 0.1–0.9)
MYELOCYTES NFR BLD MANUAL: 4 % (ref 0–0)
NEUTROPHILS # BLD AUTO: 69.16 10*3/MM3 (ref 1.7–7)
NEUTROPHILS NFR BLD MANUAL: 36 % (ref 42.7–76)
NEUTS BAND NFR BLD MANUAL: 15 % (ref 0–5)
NRBC SPEC MANUAL: 2 /100 WBC (ref 0–0.2)
PHOSPHATE SERPL-MCNC: 2.8 MG/DL (ref 2.5–4.5)
PLATELET # BLD AUTO: 156 10*3/MM3 (ref 140–450)
PMV BLD AUTO: 7.4 FL (ref 6–12)
POLYCHROMASIA BLD QL SMEAR: ABNORMAL
POTASSIUM SERPL-SCNC: 4.1 MMOL/L (ref 3.5–5.2)
PROMYELOCYTES NFR BLD MANUAL: 6 % (ref 0–0)
PROT SERPL-MCNC: 7 G/DL (ref 6–8.5)
RBC # BLD AUTO: 3.47 10*6/MM3 (ref 3.77–5.28)
SCAN SLIDE: NORMAL
SODIUM SERPL-SCNC: 139 MMOL/L (ref 136–145)
UNIT  ABO: NORMAL
UNIT  RH: NORMAL
URATE SERPL-MCNC: 5.8 MG/DL (ref 2.4–5.7)
VARIANT LYMPHS NFR BLD MANUAL: 2 % (ref 19.6–45.3)
WBC MORPH BLD: NORMAL
WBC NRBC COR # BLD: 135.6 10*3/MM3 (ref 3.4–10.8)

## 2022-07-31 PROCEDURE — 63710000001 PREDNISONE PER 1 MG: Performed by: HOSPITALIST

## 2022-07-31 PROCEDURE — 85025 COMPLETE CBC W/AUTO DIFF WBC: CPT | Performed by: NURSE PRACTITIONER

## 2022-07-31 PROCEDURE — 84550 ASSAY OF BLOOD/URIC ACID: CPT | Performed by: HOSPITALIST

## 2022-07-31 PROCEDURE — 63710000001 INSULIN LISPRO (HUMAN) PER 5 UNITS: Performed by: HOSPITALIST

## 2022-07-31 PROCEDURE — 80053 COMPREHEN METABOLIC PANEL: CPT | Performed by: HOSPITALIST

## 2022-07-31 PROCEDURE — 84100 ASSAY OF PHOSPHORUS: CPT | Performed by: HOSPITALIST

## 2022-07-31 PROCEDURE — 99232 SBSQ HOSP IP/OBS MODERATE 35: CPT | Performed by: INTERNAL MEDICINE

## 2022-07-31 PROCEDURE — 99232 SBSQ HOSP IP/OBS MODERATE 35: CPT | Performed by: HOSPITALIST

## 2022-07-31 PROCEDURE — 25010000002 CEFEPIME PER 500 MG: Performed by: INTERNAL MEDICINE

## 2022-07-31 PROCEDURE — 82962 GLUCOSE BLOOD TEST: CPT

## 2022-07-31 PROCEDURE — 63710000001 INSULIN GLARGINE PER 5 UNITS: Performed by: INTERNAL MEDICINE

## 2022-07-31 PROCEDURE — 63710000001 INSULIN LISPRO (HUMAN) PER 5 UNITS: Performed by: INTERNAL MEDICINE

## 2022-07-31 PROCEDURE — 85007 BL SMEAR W/DIFF WBC COUNT: CPT | Performed by: NURSE PRACTITIONER

## 2022-07-31 PROCEDURE — 83735 ASSAY OF MAGNESIUM: CPT | Performed by: HOSPITALIST

## 2022-07-31 PROCEDURE — 63710000001 INSULIN REGULAR HUMAN PER 5 UNITS: Performed by: HOSPITALIST

## 2022-07-31 RX ORDER — IPRATROPIUM BROMIDE AND ALBUTEROL SULFATE 2.5; .5 MG/3ML; MG/3ML
3 SOLUTION RESPIRATORY (INHALATION) EVERY 4 HOURS PRN
Status: DISCONTINUED | OUTPATIENT
Start: 2022-07-31 | End: 2022-08-05 | Stop reason: HOSPADM

## 2022-07-31 RX ADMIN — INSULIN HUMAN 8 UNITS: 100 INJECTION, SOLUTION PARENTERAL at 09:03

## 2022-07-31 RX ADMIN — Medication 10 ML: at 09:08

## 2022-07-31 RX ADMIN — GUAIFENESIN 600 MG: 600 TABLET, EXTENDED RELEASE ORAL at 20:34

## 2022-07-31 RX ADMIN — CEFEPIME HYDROCHLORIDE 2 G: 2 INJECTION, POWDER, FOR SOLUTION INTRAVENOUS at 00:53

## 2022-07-31 RX ADMIN — INSULIN GLARGINE 20 UNITS: 100 INJECTION, SOLUTION SUBCUTANEOUS at 20:34

## 2022-07-31 RX ADMIN — PREDNISONE 40 MG: 20 TABLET ORAL at 09:03

## 2022-07-31 RX ADMIN — ALLOPURINOL 300 MG: 300 TABLET ORAL at 09:03

## 2022-07-31 RX ADMIN — GUAIFENESIN 600 MG: 600 TABLET, EXTENDED RELEASE ORAL at 09:03

## 2022-07-31 RX ADMIN — GABAPENTIN 300 MG: 300 CAPSULE ORAL at 20:33

## 2022-07-31 RX ADMIN — FOLIC ACID 1 MG: 1 TABLET ORAL at 09:03

## 2022-07-31 RX ADMIN — CEFEPIME HYDROCHLORIDE 2 G: 2 INJECTION, POWDER, FOR SOLUTION INTRAVENOUS at 09:04

## 2022-07-31 RX ADMIN — RIVAROXABAN 20 MG: 20 TABLET, FILM COATED ORAL at 17:13

## 2022-07-31 RX ADMIN — INSULIN LISPRO 14 UNITS: 100 INJECTION, SOLUTION INTRAVENOUS; SUBCUTANEOUS at 19:12

## 2022-07-31 RX ADMIN — CITALOPRAM HYDROBROMIDE 10 MG: 20 TABLET ORAL at 09:02

## 2022-07-31 RX ADMIN — OXYCODONE 10 MG: 5 TABLET ORAL at 13:09

## 2022-07-31 RX ADMIN — OXYCODONE 10 MG: 5 TABLET ORAL at 20:43

## 2022-07-31 RX ADMIN — GABAPENTIN 300 MG: 300 CAPSULE ORAL at 09:02

## 2022-07-31 RX ADMIN — INSULIN LISPRO 5 UNITS: 100 INJECTION, SOLUTION INTRAVENOUS; SUBCUTANEOUS at 09:03

## 2022-07-31 RX ADMIN — HYDROXYUREA 1000 MG: 500 CAPSULE ORAL at 20:34

## 2022-07-31 RX ADMIN — GABAPENTIN 300 MG: 300 CAPSULE ORAL at 17:13

## 2022-07-31 RX ADMIN — CEFEPIME HYDROCHLORIDE 2 G: 2 INJECTION, POWDER, FOR SOLUTION INTRAVENOUS at 17:14

## 2022-07-31 RX ADMIN — TRAZODONE HYDROCHLORIDE 50 MG: 50 TABLET ORAL at 20:33

## 2022-07-31 RX ADMIN — INSULIN LISPRO 14 UNITS: 100 INJECTION, SOLUTION INTRAVENOUS; SUBCUTANEOUS at 17:14

## 2022-07-31 RX ADMIN — Medication 10 ML: at 20:38

## 2022-07-31 RX ADMIN — MIRTAZAPINE 15 MG: 15 TABLET, FILM COATED ORAL at 20:33

## 2022-07-31 RX ADMIN — INSULIN LISPRO 5 UNITS: 100 INJECTION, SOLUTION INTRAVENOUS; SUBCUTANEOUS at 17:15

## 2022-07-31 RX ADMIN — INSULIN LISPRO 5 UNITS: 100 INJECTION, SOLUTION INTRAVENOUS; SUBCUTANEOUS at 13:09

## 2022-07-31 RX ADMIN — LOSARTAN POTASSIUM 25 MG: 25 TABLET, FILM COATED ORAL at 09:03

## 2022-07-31 RX ADMIN — HYDROXYUREA 1000 MG: 500 CAPSULE ORAL at 09:06

## 2022-07-31 RX ADMIN — METOPROLOL SUCCINATE 25 MG: 25 TABLET, EXTENDED RELEASE ORAL at 09:03

## 2022-08-01 LAB
ALBUMIN SERPL-MCNC: 3.5 G/DL (ref 3.5–5.2)
ALBUMIN/GLOB SERPL: 1.1 G/DL
ALP SERPL-CCNC: 546 U/L (ref 39–117)
ALT SERPL W P-5'-P-CCNC: 27 U/L (ref 1–33)
ANION GAP SERPL CALCULATED.3IONS-SCNC: 12 MMOL/L (ref 5–15)
ANISOCYTOSIS BLD QL: ABNORMAL
AST SERPL-CCNC: 20 U/L (ref 1–32)
BACTERIA SPEC AEROBE CULT: NORMAL
BACTERIA SPEC AEROBE CULT: NORMAL
BASOPHILS # BLD MANUAL: 13.88 10*3/MM3 (ref 0–0.2)
BASOPHILS NFR BLD MANUAL: 12 % (ref 0–1.5)
BILIRUB SERPL-MCNC: 1.1 MG/DL (ref 0–1.2)
BLASTS NFR BLD MANUAL: 16 % (ref 0–0)
BUN SERPL-MCNC: 26 MG/DL (ref 6–20)
BUN/CREAT SERPL: 48.1 (ref 7–25)
CALCIUM SPEC-SCNC: 8.8 MG/DL (ref 8.6–10.5)
CHLORIDE SERPL-SCNC: 100 MMOL/L (ref 98–107)
CO2 SERPL-SCNC: 24 MMOL/L (ref 22–29)
CREAT SERPL-MCNC: 0.54 MG/DL (ref 0.57–1)
DEPRECATED RDW RBC AUTO: 60.8 FL (ref 37–54)
EGFRCR SERPLBLD CKD-EPI 2021: 115.2 ML/MIN/1.73
EOSINOPHIL # BLD MANUAL: 6.94 10*3/MM3 (ref 0–0.4)
EOSINOPHIL NFR BLD MANUAL: 6 % (ref 0.3–6.2)
ERYTHROCYTE [DISTWIDTH] IN BLOOD BY AUTOMATED COUNT: 20.9 % (ref 12.3–15.4)
GLOBULIN UR ELPH-MCNC: 3.2 GM/DL
GLUCOSE BLDC GLUCOMTR-MCNC: 192 MG/DL (ref 70–105)
GLUCOSE BLDC GLUCOMTR-MCNC: 247 MG/DL (ref 70–105)
GLUCOSE BLDC GLUCOMTR-MCNC: 272 MG/DL (ref 70–105)
GLUCOSE BLDC GLUCOMTR-MCNC: 343 MG/DL (ref 70–105)
GLUCOSE BLDC GLUCOMTR-MCNC: 434 MG/DL (ref 70–105)
GLUCOSE SERPL-MCNC: 285 MG/DL (ref 65–99)
GRAM STN SPEC: NORMAL
HCT VFR BLD AUTO: 27.2 % (ref 34–46.6)
HGB BLD-MCNC: 8.1 G/DL (ref 12–15.9)
LYMPHOCYTES # BLD MANUAL: 10.41 10*3/MM3 (ref 0.7–3.1)
LYMPHOCYTES NFR BLD MANUAL: 3 % (ref 5–12)
MCH RBC QN AUTO: 25 PG (ref 26.6–33)
MCHC RBC AUTO-ENTMCNC: 29.9 G/DL (ref 31.5–35.7)
MCV RBC AUTO: 83.7 FL (ref 79–97)
METAMYELOCYTES NFR BLD MANUAL: 4 % (ref 0–0)
MICROCYTES BLD QL: ABNORMAL
MONOCYTES # BLD: 3.47 10*3/MM3 (ref 0.1–0.9)
MYELOCYTES NFR BLD MANUAL: 1 % (ref 0–0)
NEUTROPHILS # BLD AUTO: 49.75 10*3/MM3 (ref 1.7–7)
NEUTROPHILS NFR BLD MANUAL: 39 % (ref 42.7–76)
NEUTS BAND NFR BLD MANUAL: 4 % (ref 0–5)
NRBC SPEC MANUAL: 3 /100 WBC (ref 0–0.2)
PLAT MORPH BLD: NORMAL
PLATELET # BLD AUTO: 121 10*3/MM3 (ref 140–450)
PMV BLD AUTO: 7 FL (ref 6–12)
POIKILOCYTOSIS BLD QL SMEAR: ABNORMAL
POTASSIUM SERPL-SCNC: 4.5 MMOL/L (ref 3.5–5.2)
PROMYELOCYTES NFR BLD MANUAL: 6 % (ref 0–0)
PROT SERPL-MCNC: 6.7 G/DL (ref 6–8.5)
RBC # BLD AUTO: 3.25 10*6/MM3 (ref 3.77–5.28)
SCAN SLIDE: NORMAL
SODIUM SERPL-SCNC: 136 MMOL/L (ref 136–145)
VARIANT LYMPHS NFR BLD MANUAL: 9 % (ref 19.6–45.3)
WBC MORPH BLD: NORMAL
WBC NRBC COR # BLD: 115.7 10*3/MM3 (ref 3.4–10.8)

## 2022-08-01 PROCEDURE — 63710000001 INSULIN LISPRO (HUMAN) PER 5 UNITS: Performed by: HOSPITALIST

## 2022-08-01 PROCEDURE — 85025 COMPLETE CBC W/AUTO DIFF WBC: CPT | Performed by: NURSE PRACTITIONER

## 2022-08-01 PROCEDURE — 99232 SBSQ HOSP IP/OBS MODERATE 35: CPT | Performed by: INTERNAL MEDICINE

## 2022-08-01 PROCEDURE — 82962 GLUCOSE BLOOD TEST: CPT

## 2022-08-01 PROCEDURE — 85007 BL SMEAR W/DIFF WBC COUNT: CPT | Performed by: NURSE PRACTITIONER

## 2022-08-01 PROCEDURE — 94762 N-INVAS EAR/PLS OXIMTRY CONT: CPT

## 2022-08-01 PROCEDURE — 25010000002 CEFEPIME PER 500 MG: Performed by: INTERNAL MEDICINE

## 2022-08-01 PROCEDURE — 63710000001 INSULIN LISPRO (HUMAN) PER 5 UNITS: Performed by: INTERNAL MEDICINE

## 2022-08-01 PROCEDURE — 80053 COMPREHEN METABOLIC PANEL: CPT | Performed by: HOSPITALIST

## 2022-08-01 PROCEDURE — 63710000001 PREDNISONE PER 1 MG: Performed by: HOSPITALIST

## 2022-08-01 PROCEDURE — 63710000001 INSULIN REGULAR HUMAN PER 5 UNITS: Performed by: HOSPITALIST

## 2022-08-01 PROCEDURE — 63710000001 INSULIN GLARGINE PER 5 UNITS: Performed by: INTERNAL MEDICINE

## 2022-08-01 RX ORDER — PANTOPRAZOLE SODIUM 40 MG/1
40 TABLET, DELAYED RELEASE ORAL
Status: DISCONTINUED | OUTPATIENT
Start: 2022-08-01 | End: 2022-08-03

## 2022-08-01 RX ADMIN — GUAIFENESIN 600 MG: 600 TABLET, EXTENDED RELEASE ORAL at 08:20

## 2022-08-01 RX ADMIN — ALPRAZOLAM 0.25 MG: 0.5 TABLET ORAL at 21:15

## 2022-08-01 RX ADMIN — METOPROLOL SUCCINATE 25 MG: 25 TABLET, EXTENDED RELEASE ORAL at 08:20

## 2022-08-01 RX ADMIN — HYDROXYUREA 1000 MG: 500 CAPSULE ORAL at 08:24

## 2022-08-01 RX ADMIN — OXYCODONE 10 MG: 5 TABLET ORAL at 21:15

## 2022-08-01 RX ADMIN — ALLOPURINOL 300 MG: 300 TABLET ORAL at 08:20

## 2022-08-01 RX ADMIN — LOSARTAN POTASSIUM 25 MG: 25 TABLET, FILM COATED ORAL at 08:20

## 2022-08-01 RX ADMIN — INSULIN HUMAN 8 UNITS: 100 INJECTION, SOLUTION PARENTERAL at 08:20

## 2022-08-01 RX ADMIN — FOLIC ACID 1 MG: 1 TABLET ORAL at 08:20

## 2022-08-01 RX ADMIN — INSULIN LISPRO 5 UNITS: 100 INJECTION, SOLUTION INTRAVENOUS; SUBCUTANEOUS at 08:21

## 2022-08-01 RX ADMIN — RIVAROXABAN 20 MG: 20 TABLET, FILM COATED ORAL at 17:16

## 2022-08-01 RX ADMIN — Medication 10 ML: at 08:21

## 2022-08-01 RX ADMIN — CEFEPIME HYDROCHLORIDE 2 G: 2 INJECTION, POWDER, FOR SOLUTION INTRAVENOUS at 01:41

## 2022-08-01 RX ADMIN — GUAIFENESIN 600 MG: 600 TABLET, EXTENDED RELEASE ORAL at 21:15

## 2022-08-01 RX ADMIN — INSULIN LISPRO 3 UNITS: 100 INJECTION, SOLUTION INTRAVENOUS; SUBCUTANEOUS at 12:26

## 2022-08-01 RX ADMIN — GABAPENTIN 300 MG: 300 CAPSULE ORAL at 08:21

## 2022-08-01 RX ADMIN — PREDNISONE 40 MG: 20 TABLET ORAL at 08:21

## 2022-08-01 RX ADMIN — CITALOPRAM HYDROBROMIDE 10 MG: 20 TABLET ORAL at 08:20

## 2022-08-01 RX ADMIN — OXYCODONE 10 MG: 5 TABLET ORAL at 08:00

## 2022-08-01 RX ADMIN — INSULIN LISPRO 5 UNITS: 100 INJECTION, SOLUTION INTRAVENOUS; SUBCUTANEOUS at 12:26

## 2022-08-01 RX ADMIN — CEFEPIME HYDROCHLORIDE 2 G: 2 INJECTION, POWDER, FOR SOLUTION INTRAVENOUS at 08:20

## 2022-08-01 RX ADMIN — TRAZODONE HYDROCHLORIDE 50 MG: 50 TABLET ORAL at 21:15

## 2022-08-01 RX ADMIN — CEFEPIME HYDROCHLORIDE 2 G: 2 INJECTION, POWDER, FOR SOLUTION INTRAVENOUS at 16:41

## 2022-08-01 RX ADMIN — INSULIN LISPRO 10 UNITS: 100 INJECTION, SOLUTION INTRAVENOUS; SUBCUTANEOUS at 16:41

## 2022-08-01 RX ADMIN — MIRTAZAPINE 15 MG: 15 TABLET, FILM COATED ORAL at 21:15

## 2022-08-01 RX ADMIN — INSULIN LISPRO 14 UNITS: 100 INJECTION, SOLUTION INTRAVENOUS; SUBCUTANEOUS at 21:15

## 2022-08-01 RX ADMIN — HYDROCODONE BITARTRATE AND ACETAMINOPHEN 1 TABLET: 10; 325 TABLET ORAL at 16:43

## 2022-08-01 RX ADMIN — HYDROXYUREA 1000 MG: 500 CAPSULE ORAL at 21:14

## 2022-08-01 RX ADMIN — PANTOPRAZOLE SODIUM 40 MG: 40 TABLET, DELAYED RELEASE ORAL at 16:41

## 2022-08-01 RX ADMIN — INSULIN GLARGINE 20 UNITS: 100 INJECTION, SOLUTION SUBCUTANEOUS at 21:14

## 2022-08-01 RX ADMIN — GABAPENTIN 300 MG: 300 CAPSULE ORAL at 21:15

## 2022-08-01 RX ADMIN — GABAPENTIN 300 MG: 300 CAPSULE ORAL at 16:41

## 2022-08-01 RX ADMIN — INSULIN LISPRO 5 UNITS: 100 INJECTION, SOLUTION INTRAVENOUS; SUBCUTANEOUS at 08:20

## 2022-08-01 RX ADMIN — INSULIN LISPRO 5 UNITS: 100 INJECTION, SOLUTION INTRAVENOUS; SUBCUTANEOUS at 17:16

## 2022-08-01 RX ADMIN — Medication 10 ML: at 21:16

## 2022-08-01 NOTE — PROGRESS NOTES
Hematology/Oncology Inpatient Progress Note    PATIENT NAME: Nieves Mc  : 1975  MRN: [id number]    CHIEF COMPLAINT: Shortness of breath    HISTORY OF PRESENT ILLNESS:      Nieves Mc is a 46 y.o. female who presented to [facility] with complaints of SOA. Labs showed , Hgb 8.5, Platelets 306, Blasts 26%. Uric Acid 9.0, Alk Phos 875. CT scan of the chest showing extensive groundglass attenuation throughout the lungs is likely related to an inflammatory or atypical infectious process. A component of edema should also be a consideration. Covid pneumonia would be in differential diagnosis as well. Patient was started on antibiotics in the ER.        22  Hematology/Oncology was consulted for established patient with CML presenting with 26% blasts on CBC.  Patient is currently on Hydrea with history of noncompliance with medications. Recent hospitalization 2022 to 2022 for same. At that time hydrea was continued. Last TKI was Tasigna and it was discontinued due to concerns that heart disease was a complication of treatment.   · 2022 echocardiogram done showing EF 41-45%  · 2022 discharge from [facility] on hydroxyurea 500 mg daily  · 2022 proBNP 13,362  · 2022 LDH is high at 1974  · 2022 pRBC transfused for Hgb 6.7     Patient with history of CML, noncompliant with medicines including TKI's.  Patient with multiple hospitalizations.  Was in the hospital discharged on 2022 on hydroxyurea 500 mg daily.     Patient is admitted to the hospital with dyspnea.  CT of the chest shows groundglass opacifications which was secondary to inflammatory or atypical infectious process     She  has a past medical history of Bone pain, COVID-19 virus infection (2022), Diabetes mellitus (HCC), Extremity pain, Leg pain, Migraine, Pulmonary embolism (HCC), and Vision loss.     PCP: Alida Latham APRN    History of present illness was reviewed  and is unchanged from the previous visit. 07/31/22    Subjective      Patient denies any new issues this morning.  She continues to feel weak.    ROS:    Review of Systems   Constitutional: Positive for fatigue. Negative for chills and fever.   HENT: Negative for congestion, drooling, ear discharge, rhinorrhea, sinus pressure and tinnitus.    Eyes: Negative for photophobia, pain and discharge.   Respiratory: Negative for apnea, choking and stridor.    Cardiovascular: Negative for palpitations.   Gastrointestinal: Negative for abdominal distention, abdominal pain and anal bleeding.   Endocrine: Negative for polydipsia and polyphagia.   Genitourinary: Negative for decreased urine volume, flank pain and genital sores.   Musculoskeletal: Negative for gait problem, neck pain and neck stiffness.   Skin: Negative for color change, rash and wound.   Neurological: Positive for weakness. Negative for tremors, seizures, syncope, facial asymmetry and speech difficulty.   Hematological: Negative for adenopathy.   Psychiatric/Behavioral: Negative for agitation, confusion, hallucinations and self-injury. The patient is not hyperactive.         MEDICATIONS:    Scheduled Meds:  allopurinol, 300 mg, Oral, Daily  cefepime, 2 g, Intravenous, Q8H  citalopram, 10 mg, Oral, Daily  folic acid, 1 mg, Oral, Daily  gabapentin, 300 mg, Oral, TID  guaiFENesin, 600 mg, Oral, Q12H  hydroxyurea, 1,000 mg, Oral, BID  insulin glargine, 20 Units, Subcutaneous, Nightly  insulin lispro, 0-14 Units, Subcutaneous, TID AC  insulin lispro, 5 Units, Subcutaneous, TID With Meals  [START ON 8/2/2022] insulin regular, 12 Units, Subcutaneous, Daily With Breakfast  losartan, 25 mg, Oral, Q24H  metoprolol succinate XL, 25 mg, Oral, Q24H  mirtazapine, 15 mg, Oral, Nightly  pantoprazole, 40 mg, Oral, Q AM  predniSONE, 40 mg, Oral, Daily With Breakfast  rivaroxaban, 20 mg, Oral, Daily With Dinner  sodium chloride, 10 mL, Intravenous, Q12H  traZODone, 50 mg, Oral,  "Nightly       Continuous Infusions:      PRN Meds:  •  acetaminophen **OR** acetaminophen **OR** acetaminophen  •  ALPRAZolam  •  aluminum-magnesium hydroxide-simethicone  •  dextrose  •  dextrose  •  glucagon (human recombinant)  •  hydrALAZINE  •  HYDROcodone-acetaminophen  •  insulin lispro **AND** insulin lispro  •  ipratropium-albuterol  •  melatonin  •  nitroglycerin  •  ondansetron **OR** ondansetron  •  oxyCODONE  •  [COMPLETED] Insert peripheral IV **AND** sodium chloride  •  sodium chloride     ALLERGIES:    Allergies   Allergen Reactions   • Hydromorphone GI Intolerance     dilaudid   • Morphine Nausea And Vomiting   • Propofol Hives     Previously tolerated being premedicated with diphenhydramine and famotadine       Objective    VITALS:   /86   Pulse 115   Temp 97.6 °F (36.4 °C) (Oral)   Resp 18   Ht 162.6 cm (64\")   Wt 61.3 kg (135 lb 2.3 oz)   SpO2 96%   BMI 23.20 kg/m²     PHYSICAL EXAM:     Physical Exam  Vitals and nursing note reviewed.   Constitutional:       General: She is not in acute distress.     Appearance: She is not diaphoretic.   HENT:      Head: Normocephalic and atraumatic.   Eyes:      General: No scleral icterus.        Right eye: No discharge.         Left eye: No discharge.      Conjunctiva/sclera: Conjunctivae normal.   Neck:      Thyroid: No thyromegaly.   Cardiovascular:      Rate and Rhythm: Normal rate and regular rhythm.      Heart sounds: Normal heart sounds.     No friction rub. No gallop.   Pulmonary:      Effort: Pulmonary effort is normal. No respiratory distress.      Breath sounds: No stridor. No wheezing.   Abdominal:      General: Bowel sounds are normal.      Palpations: Abdomen is soft. There is no mass.      Tenderness: There is no abdominal tenderness. There is no guarding or rebound.   Musculoskeletal:         General: No tenderness. Normal range of motion.      Cervical back: Normal range of motion and neck supple.   Lymphadenopathy:      Cervical: " No cervical adenopathy.   Skin:     General: Skin is warm.      Findings: Bruising present. No erythema or rash.      Comments: On her back   Neurological:      Mental Status: She is alert and oriented to person, place, and time.      Motor: No abnormal muscle tone.   Psychiatric:         Behavior: Behavior normal.       I have reexamined the patient and the results are consistent with the previously documented exam. Meghann Lerma MD     RECENT LABS:    Lab Results (last 24 hours)     Procedure Component Value Units Date/Time    POC Glucose Once [[id number]]  (Abnormal) Collected: 08/01/22 1625    Specimen: Blood Updated: 08/01/22 1625     Glucose 343 mg/dL      Comment: Serial Number: [id number]:  [id number]       POC Glucose Once [[id number]]  (Abnormal) Collected: 08/01/22 1120    Specimen: Blood Updated: 08/01/22 1121     Glucose 192 mg/dL      Comment: Serial Number: [id number]:  [id number]       POC Glucose Once [[id number]]  (Abnormal) Collected: 08/01/22 0706    Specimen: Blood Updated: 08/01/22 0707     Glucose 247 mg/dL      Comment: Serial Number: [id number]:  [id number]       Manual Differential [[id number]]  (Abnormal) Collected: 08/01/22 0326    Specimen: Blood Updated: 08/01/22 0655     Neutrophil % 39.0 %      Lymphocyte % 9.0 %      Monocyte % 3.0 %      Eosinophil % 6.0 %      Basophil % 12.0 %      Bands %  4.0 %      Metamyelocyte % 4.0 %      Myelocyte % 1.0 %      Promyelocyte % 6.0 %      Blasts % 16.0 %      Neutrophils Absolute 49.75 10*3/mm3      Lymphocytes Absolute 10.41 10*3/mm3      Monocytes Absolute 3.47 10*3/mm3      Eosinophils Absolute 6.94 10*3/mm3      Basophils Absolute 13.88 10*3/mm3      nRBC 3.0 /100 WBC      Anisocytosis Slight/1+     Microcytes Slight/1+     Poikilocytes Slight/1+     WBC Morphology Normal     Platelet Morphology Normal    Narrative:      Reviewed by Pathologist within the past 30 days on [id number] .      CBC & Differential [[id number]]   (Abnormal) Collected: 08/01/22 0326    Specimen: Blood Updated: 08/01/22 0655    Narrative:      The following orders were created for panel order CBC & Differential.  Procedure                               Abnormality         Status                     ---------                               -----------         ------                     CBC Auto Differential[[id number]]        Abnormal            Final result               Scan Slide[[id number]]                                       Final result                 Please view results for these tests on the individual orders.    Scan Slide [[id number]] Collected: 08/01/22 0326    Specimen: Blood Updated: 08/01/22 0655     Scan Slide --     Comment: See Manual Differential Results       CBC Auto Differential [[id number]]  (Abnormal) Collected: 08/01/22 0326    Specimen: Blood Updated: 08/01/22 0655     .70 10*3/mm3      RBC 3.25 10*6/mm3      Hemoglobin 8.1 g/dL      Hematocrit 27.2 %      MCV 83.7 fL      MCH 25.0 pg      MCHC 29.9 g/dL      RDW 20.9 %      RDW-SD 60.8 fl      MPV 7.0 fL      Platelets 121 10*3/mm3     Narrative:      The previously reported component NRBC is no longer being reported. Previous result was 0.7 /100 WBC (Reference Range: 0.0-0.2 /100 WBC) on 8/1/2022 at [facility] EDT.    Comprehensive Metabolic Panel [[id number]]  (Abnormal) Collected: 08/01/22 0326    Specimen: Blood Updated: 08/01/22 0459     Glucose 285 mg/dL      BUN 26 mg/dL      Creatinine 0.54 mg/dL      Sodium 136 mmol/L      Potassium 4.5 mmol/L      Chloride 100 mmol/L      CO2 24.0 mmol/L      Calcium 8.8 mg/dL      Total Protein 6.7 g/dL      Albumin 3.50 g/dL      ALT (SGPT) 27 U/L      AST (SGOT) 20 U/L      Alkaline Phosphatase 546 U/L      Total Bilirubin 1.1 mg/dL      Globulin 3.2 gm/dL      A/G Ratio 1.1 g/dL      BUN/Creatinine Ratio 48.1     Anion Gap 12.0 mmol/L      eGFR 115.2 mL/min/1.73      Comment: National Kidney Foundation and American Society of Nephrology  (ASN) Task Force recommended calculation based on the Chronic Kidney Disease Epidemiology Collaboration (CKD-EPI) equation refit without adjustment for race.       Narrative:      GFR Normal >60  Chronic Kidney Disease <60  Kidney Failure <15      Blood Culture - Blood, Arm, Right [[id number]]  (Normal) Collected: 07/27/22 2304    Specimen: Blood from Arm, Right Updated: 07/31/22 2317     Blood Culture No growth at 4 days    Blood Culture - Blood, Arm, Left [[id number]]  (Normal) Collected: 07/27/22 2203    Specimen: Blood from Arm, Left Updated: 07/31/22 2217     Blood Culture No growth at 4 days     Gram Stain --     Comment: The previously reported stain <null> is no longer being reported.       POC Glucose Once [[id number]]  (Abnormal) Collected: 07/31/22 2142    Specimen: Blood Updated: 07/31/22 2145     Glucose 359 mg/dL      Comment: Serial Number: [id number]:  [id number]       POC Glucose Once [[id number]]  (Abnormal) Collected: 07/31/22 2037    Specimen: Blood Updated: 07/31/22 2038     Glucose 449 mg/dL      Comment: Serial Number: [id number]:  [id number]       POC Glucose Once [[id number]]  (Abnormal) Collected: 07/31/22 1856    Specimen: Blood Updated: 07/31/22 1858     Glucose 559 mg/dL      Comment: Serial Number: [id number]:  [id number]             PENDING RESULTS:     IMAGING REVIEWED:  No radiology results for the last day    Assessment & Plan   ASSESSMENT:  1. CML with hyperleukocytosis :  Has been initiated on IVF, steroids, allopurinol and hydroxyurea has been continued. Hx. of non-compliance with treatment.  Her white count is beginning to drop on hydroxyurea.  WBC decreased to 115.7 today from 135.6 yesterday . Continue to monitor CBC daily. Her leukocytosis is stable.  Blast count is down to 14%.  She has no evidence of tumor lysis syndrome.  Overall her counts continue to decrease with hydroxyurea    2. Significant anemia secondary to malignancy, hydroxyurea.  Patient receiving  PRBC's as needed. Hgb 8.1 today.    3. History of PE: on rivaroxaban.  She will continue the same    4. Acute Resp Failure/Acute on Chronic CHF/Type 2 DM-managed per primary team    5. Anxiety/Depression    6. Chronic Pain     PLAN  1. Hydroxyurea to 1 gm BID. Continue to look for TKI options for her given her underlying cardiac disease.   2. Consult Palliative Care to discuss goals of care. Patient is still interested in pursuing treatment.  3. Transfuse PRBC's as needed  4. Daily CBCs  5. Continue supportive care  6. Discussed with patient             This is a 46-year-old female well-known to our service.  Patient with history of CML, noncompliant with medicines including TKI's.  Patient with multiple hospitalizations.  Was in the hospital discharged on 7/8/2022 on hydroxyurea 500 mg daily.     Patient is admitted to the hospital with dyspnea.  CT of the chest shows groundglass opacifications which was secondary to inflammatory or atypical infectious process     Physical exam she appears chronically ill, she has rhonchi on exam     I have reviewed her labs, imaging studies and progress notes.  She has leukocytosis, hemoglobin 7.3 normal platelet counts with up to 18% blast forms and leukoerythroblastic changes  She has normal renal function tests, LDH is high at 1974, uric acid is 9 and phosphorus is normal.  She also has a normal calcium therefore no evidence of tumor lysis syndrome.     I have adjusted her dose of hydroxyurea to 1 g twice a day    Patient will remain on hydroxyurea at current doses  Continue to look for TKI options for her given her underlying cardiac disease.  Patient is still interested in pursuing treatment.  Her leukocytosis is stable.  Blast count is down to 14%.  She has no evidence of tumor lysis syndrome based on her labs.  Continue to monitor her daily CBCs  Continue supportive care    CBC reviewed.  Her white count is down to 113,000    We will continue to follow    Discussed with  patient    Electronically signed by Meghann Lerma MD, 08/01/22, 5:28 PM EDT.

## 2022-08-01 NOTE — PLAN OF CARE
Problem: Pain Acute  Goal: Acceptable Pain Control and Functional Ability  Outcome: Ongoing, Progressing  Intervention: Prevent or Manage Pain  Recent Flowsheet Documentation  Taken 8/1/2022 0400 by Karissa Ware RN  Medication Review/Management: medications reviewed  Taken 8/1/2022 0200 by Karissa Ware RN  Medication Review/Management: medications reviewed  Taken 8/1/2022 0000 by Karissa Ware RN  Medication Review/Management: medications reviewed  Intervention: Optimize Psychosocial Wellbeing  Recent Flowsheet Documentation  Taken 8/1/2022 0000 by Karissa Ware RN  Diversional Activities:   television   smartphone     Problem: Adult Inpatient Plan of Care  Goal: Plan of Care Review  Outcome: Ongoing, Progressing  Goal: Patient-Specific Goal (Individualized)  Outcome: Ongoing, Progressing  Goal: Absence of Hospital-Acquired Illness or Injury  Outcome: Ongoing, Progressing  Intervention: Identify and Manage Fall Risk  Recent Flowsheet Documentation  Taken 8/1/2022 0400 by Karissa Ware RN  Safety Promotion/Fall Prevention: safety round/check completed  Taken 8/1/2022 0200 by Karissa Ware RN  Safety Promotion/Fall Prevention: safety round/check completed  Taken 8/1/2022 0000 by Karissa Ware RN  Safety Promotion/Fall Prevention: safety round/check completed  Intervention: Prevent Infection  Recent Flowsheet Documentation  Taken 8/1/2022 0400 by Karissa Ware RN  Infection Prevention:   single patient room provided   hand hygiene promoted  Taken 8/1/2022 0200 by Karissa Ware RN  Infection Prevention:   single patient room provided   hand hygiene promoted  Taken 8/1/2022 0000 by Karissa Ware RN  Infection Prevention:   single patient room provided   hand hygiene promoted  Goal: Optimal Comfort and Wellbeing  Outcome: Ongoing, Progressing  Intervention: Provide Person-Centered Care  Recent Flowsheet Documentation  Taken 8/1/2022 0400 by Chaz  Karissa RN  Trust Relationship/Rapport:   care explained   questions answered  Taken 8/1/2022 0000 by Karissa Ware RN  Trust Relationship/Rapport: care explained  Goal: Readiness for Transition of Care  Outcome: Ongoing, Progressing   Goal Outcome Evaluation:      Care plan reviewed with patient and she v/u

## 2022-08-01 NOTE — PROGRESS NOTES
[facility] Medicine Services Daily Progress Note    Patient Name: Nieves Mc  : 1975  MRN: [id number]  Primary Care Physician:  Alida Latham APRN  Date of admission: 2022      Subjective      Chief Complaint: Recurrent shortness of breath      Patient Reports that she is feeling okay.  On room air.  Saturating 89 to 90%.  Still tachycardic.  Pulmonary consulted.  Patient required bronc last time she was admitted for similar symptoms.  Oncology following.      ROS negative except as above      Objective      Vitals:   Temp:  [97.3 °F (36.3 °C)-98 °F (36.7 °C)] 98 °F (36.7 °C)  Heart Rate:  [104-123] 112  Resp:  [18-20] 18  BP: (114-136)/(66-83) 114/68  Flow (L/min):  [1.5-2] 2    Physical Exam  Vitals reviewed.   HENT:      Head: Normocephalic.   Cardiovascular:      Rate and Rhythm: Tachycardia present.   Pulmonary:      Effort: Pulmonary effort is normal.   Abdominal:      General: Abdomen is flat.      Palpations: Abdomen is soft.   Musculoskeletal:         General: Normal range of motion.      Cervical back: Normal range of motion.   Skin:     General: Skin is warm.   Neurological:      General: No focal deficit present.      Mental Status: She is alert and oriented to person, place, and time.   Psychiatric:         Mood and Affect: Mood normal.         Behavior: Behavior normal.             Result Review    Result Review:  I have personally reviewed the results from the time of this admission to 2022 15:58 EDT and agree with these findings:  [x]  Laboratory  []  Microbiology  []  Radiology  []  EKG/Telemetry   []  Cardiology/Vascular   []  Pathology  []  Old records  []  Other:  Most notable findings include: White count 115.7.  Hemoglobin 8.1           Assessment & Plan       Brief Patient Summary:  Nieves Mc is a 46 y.o. female who         allopurinol, 300 mg, Oral, Daily  cefepime, 2 g, Intravenous, Q8H  citalopram, 10 mg, Oral, Daily  folic acid, 1  mg, Oral, Daily  gabapentin, 300 mg, Oral, TID  guaiFENesin, 600 mg, Oral, Q12H  hydroxyurea, 1,000 mg, Oral, BID  insulin glargine, 20 Units, Subcutaneous, Nightly  insulin lispro, 0-14 Units, Subcutaneous, TID AC  insulin lispro, 5 Units, Subcutaneous, TID With Meals  insulin regular, 8 Units, Subcutaneous, Daily With Breakfast  losartan, 25 mg, Oral, Q24H  metoprolol succinate XL, 25 mg, Oral, Q24H  mirtazapine, 15 mg, Oral, Nightly  predniSONE, 40 mg, Oral, Daily With Breakfast  rivaroxaban, 20 mg, Oral, Daily With Dinner  sodium chloride, 10 mL, Intravenous, Q12H  traZODone, 50 mg, Oral, Nightly               Active Hospital Problems:       Active Hospital Problems     Diagnosis     • **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  CML blast crisis -consulted hematology oncology, giving fluids, steroids, allopurinol, and continued hydroxyurea.  2.  Acute respiratory failure with hypoxia differential includes the blast crisis, pneumonia, and congestive heart failure. CT scan looks the same as it has on her previous hospitalization when she had blast crisis and cultures were negative at that time.  The groundglass opacities could very well reflect the blast crisis and the inflammatory response for all these white blood cells we will continue to treat with cefepime for now just cover of the bases.  Congestive heart failure could be possible but for now I am more concerned about the blast crisis if necessary we will give Lasix.  Pulmonary consulted  3.  Acute on chronic combined  congestive heart failure -patient may be in congestive heart failure but concerned about the blast crisis did not want to dehydrate the patient with all those white blood cells in the system and have an increased risk of tumor lysis syndrome if hematology stops the crisis.  However her shortness of breath worsens we will give Lasix  4.  History of pulmonary embolus and continue rivaroxaban  5.  Type 2 diabetes on insulin therapy placed her on prandial long-acting and correction  6.  Chronic pain patient was having a lot of pain I believe this was contributing to her tachycardia have oxycodone available she is on narcotics for a long time and it openly admits that she is dependent  7.  Anxiety and benzodiazepine dependent continued alprazolam as needed and added Remeron at night and continued her Celexa     Oncology on board, will follow there recommendation.             DVT prophylaxis:  Medical DVT prophylaxis orders are present.     CODE STATUS:    Level Of Support Discussed With: Patient  Code Status (Patient has no pulse and is not breathing): CPR (Attempt to Resuscitate)  Medical Interventions (Patient has pulse or is breathing): Full Support          This patient has been examined wearing appropriate Personal Protective Equipment and discussed with patient and nursing   08/01/22      Electronically signed by Villa Patel MD, 08/01/22, 15:58 EDT.  [facility]ist Team

## 2022-08-01 NOTE — PROGRESS NOTES
Hematology/Oncology Inpatient Progress Note    PATIENT NAME: Nieves Mc  : 1975  MRN: [id number]    CHIEF COMPLAINT: Shortness of breath    HISTORY OF PRESENT ILLNESS:      Nieves Mc is a 46 y.o. female who presented to [facility] on 2022 with complaints of SOA. Labs showed , Hgb 8.5, Platelets 306, Blasts 26%. Uric Acid 9.0, Alk Phos 875. CT scan of the chest showing extensive groundglass attenuation throughout the lungs is likely related to an inflammatory or atypical infectious process. A component of edema should also be a consideration. Covid pneumonia would be in differential diagnosis as well. Patient was started on antibiotics in the ER.        22  Hematology/Oncology was consulted for established patient with CML presenting with 26% blasts on CBC.  Patient is currently on Hydrea with history of noncompliance with medications. Recent hospitalization 2022 to 2022 for same. At that time hydrea was continued. Last TKI was Tasigna and it was discontinued due to concerns that heart disease was a complication of treatment.   · 2022 echocardiogram done showing EF 41-45%  · 2022 discharge from [facility] on hydroxyurea 500 mg daily  · 2022 proBNP 13,362     Patient with history of CML, noncompliant with medicines including TKI's.  Patient with multiple hospitalizations.  Was in the hospital discharged on 2022 on hydroxyurea 500 mg daily.     Patient is admitted to the hospital with dyspnea.  CT of the chest shows groundglass opacifications which was secondary to inflammatory or atypical infectious process     She  has a past medical history of Bone pain, COVID-19 virus infection (2022), Diabetes mellitus (HCC), Extremity pain, Leg pain, Migraine, Pulmonary embolism (HCC), and Vision loss.     PCP: Alida Latham APRN    History of present illness was reviewed and is unchanged from the previous visit. 22    Subjective           ROS:    Review of Systems   Constitutional: Positive for fatigue. Negative for chills and fever.   HENT: Negative for congestion, drooling, ear discharge, rhinorrhea, sinus pressure and tinnitus.    Eyes: Negative for photophobia, pain and discharge.   Respiratory: Negative for apnea, choking and stridor.    Cardiovascular: Negative for palpitations.   Gastrointestinal: Negative for abdominal distention, abdominal pain and anal bleeding.   Endocrine: Negative for polydipsia and polyphagia.   Genitourinary: Negative for decreased urine volume, flank pain and genital sores.   Musculoskeletal: Negative for gait problem, neck pain and neck stiffness.   Skin: Negative for color change, rash and wound.        Skin bruising on her back   Neurological: Positive for weakness. Negative for tremors, seizures, syncope, facial asymmetry and speech difficulty.   Hematological: Negative for adenopathy.   Psychiatric/Behavioral: Negative for agitation, confusion, hallucinations and self-injury. The patient is not hyperactive.         MEDICATIONS:    Scheduled Meds:  allopurinol, 300 mg, Oral, Daily  cefepime, 2 g, Intravenous, Q8H  citalopram, 10 mg, Oral, Daily  folic acid, 1 mg, Oral, Daily  gabapentin, 300 mg, Oral, TID  guaiFENesin, 600 mg, Oral, Q12H  hydroxyurea, 1,000 mg, Oral, BID  insulin glargine, 20 Units, Subcutaneous, Nightly  insulin lispro, 0-14 Units, Subcutaneous, TID AC  insulin lispro, 5 Units, Subcutaneous, TID With Meals  insulin regular, 8 Units, Subcutaneous, Daily With Breakfast  losartan, 25 mg, Oral, Q24H  metoprolol succinate XL, 25 mg, Oral, Q24H  mirtazapine, 15 mg, Oral, Nightly  predniSONE, 40 mg, Oral, Daily With Breakfast  rivaroxaban, 20 mg, Oral, Daily With Dinner  sodium chloride, 10 mL, Intravenous, Q12H  traZODone, 50 mg, Oral, Nightly       Continuous Infusions:      PRN Meds:  •  acetaminophen **OR** acetaminophen **OR** acetaminophen  •  ALPRAZolam  •  aluminum-magnesium  "hydroxide-simethicone  •  dextrose  •  dextrose  •  glucagon (human recombinant)  •  hydrALAZINE  •  HYDROcodone-acetaminophen  •  insulin lispro **AND** insulin lispro  •  ipratropium-albuterol  •  melatonin  •  nitroglycerin  •  ondansetron **OR** ondansetron  •  oxyCODONE  •  [COMPLETED] Insert peripheral IV **AND** sodium chloride  •  sodium chloride     ALLERGIES:    Allergies   Allergen Reactions   • Hydromorphone GI Intolerance     dilaudid   • Morphine Nausea And Vomiting   • Propofol Hives     Previously tolerated being premedicated with diphenhydramine and famotadine       Objective    VITALS:   /83 (BP Location: Right arm, Patient Position: Lying)   Pulse 106   Temp 98 °F (36.7 °C) (Oral)   Resp 18   Ht 162.6 cm (64\")   Wt 61.3 kg (135 lb 2.3 oz)   SpO2 95%   BMI 23.20 kg/m²     PHYSICAL EXAM: (performed by MD)  Physical Exam  Vitals and nursing note reviewed.   Constitutional:       General: She is not in acute distress.     Appearance: She is not diaphoretic.   HENT:      Head: Normocephalic and atraumatic.   Eyes:      General: No scleral icterus.        Right eye: No discharge.         Left eye: No discharge.      Conjunctiva/sclera: Conjunctivae normal.   Neck:      Thyroid: No thyromegaly.   Cardiovascular:      Rate and Rhythm: Normal rate and regular rhythm.      Heart sounds: Normal heart sounds.     No friction rub. No gallop.   Pulmonary:      Effort: Pulmonary effort is normal. No respiratory distress.      Breath sounds: No stridor. No wheezing.   Abdominal:      General: Bowel sounds are normal.      Palpations: Abdomen is soft. There is no mass.      Tenderness: There is no abdominal tenderness. There is no guarding or rebound.   Musculoskeletal:         General: No tenderness. Normal range of motion.      Cervical back: Normal range of motion and neck supple.   Lymphadenopathy:      Cervical: No cervical adenopathy.   Skin:     General: Skin is warm.      Findings: Bruising " present. No erythema or rash.      Comments: On her back   Neurological:      Mental Status: She is alert and oriented to person, place, and time.      Motor: No abnormal muscle tone.   Psychiatric:         Behavior: Behavior normal.       I have reexamined the patient and the results are consistent with the previously documented exam. Meghann Lerma MD     RECENT LABS:  Lab Results (last 24 hours)     Procedure Component Value Units Date/Time    POC Glucose Once [[id number]]  (Abnormal) Collected: 08/01/22 0706    Specimen: Blood Updated: 08/01/22 0707     Glucose 247 mg/dL      Comment: Serial Number: [id number]:  [id number]       Manual Differential [[id number]]  (Abnormal) Collected: 08/01/22 0326    Specimen: Blood Updated: 08/01/22 0655     Neutrophil % 39.0 %      Lymphocyte % 9.0 %      Monocyte % 3.0 %      Eosinophil % 6.0 %      Basophil % 12.0 %      Bands %  4.0 %      Metamyelocyte % 4.0 %      Myelocyte % 1.0 %      Promyelocyte % 6.0 %      Blasts % 16.0 %      Neutrophils Absolute 49.75 10*3/mm3      Lymphocytes Absolute 10.41 10*3/mm3      Monocytes Absolute 3.47 10*3/mm3      Eosinophils Absolute 6.94 10*3/mm3      Basophils Absolute 13.88 10*3/mm3      nRBC 3.0 /100 WBC      Anisocytosis Slight/1+     Microcytes Slight/1+     Poikilocytes Slight/1+     WBC Morphology Normal     Platelet Morphology Normal    Narrative:      Reviewed by Pathologist within the past 30 days on [id number] .      CBC & Differential [[id number]]  (Abnormal) Collected: 08/01/22 0326    Specimen: Blood Updated: 08/01/22 0655    Narrative:      The following orders were created for panel order CBC & Differential.  Procedure                               Abnormality         Status                     ---------                               -----------         ------                     CBC Auto Differential[[id number]]        Abnormal            Final result               Scan Slide[[id number]]                                        Final result                 Please view results for these tests on the individual orders.    Scan Slide [[id number]] Collected: 08/01/22 0326    Specimen: Blood Updated: 08/01/22 0655     Scan Slide --     Comment: See Manual Differential Results       CBC Auto Differential [[id number]]  (Abnormal) Collected: 08/01/22 0326    Specimen: Blood Updated: 08/01/22 0655     .70 10*3/mm3      RBC 3.25 10*6/mm3      Hemoglobin 8.1 g/dL      Hematocrit 27.2 %      MCV 83.7 fL      MCH 25.0 pg      MCHC 29.9 g/dL      RDW 20.9 %      RDW-SD 60.8 fl      MPV 7.0 fL      Platelets 121 10*3/mm3     Narrative:      The previously reported component NRBC is no longer being reported. Previous result was 0.7 /100 WBC (Reference Range: 0.0-0.2 /100 WBC) on 8/1/2022 at 0436 EDT.    Comprehensive Metabolic Panel [[id number]]  (Abnormal) Collected: 08/01/22 0326    Specimen: Blood Updated: 08/01/22 0459     Glucose 285 mg/dL      BUN 26 mg/dL      Creatinine 0.54 mg/dL      Sodium 136 mmol/L      Potassium 4.5 mmol/L      Chloride 100 mmol/L      CO2 24.0 mmol/L      Calcium 8.8 mg/dL      Total Protein 6.7 g/dL      Albumin 3.50 g/dL      ALT (SGPT) 27 U/L      AST (SGOT) 20 U/L      Alkaline Phosphatase 546 U/L      Total Bilirubin 1.1 mg/dL      Globulin 3.2 gm/dL      A/G Ratio 1.1 g/dL      BUN/Creatinine Ratio 48.1     Anion Gap 12.0 mmol/L      eGFR 115.2 mL/min/1.73      Comment: National Kidney Foundation and American Society of Nephrology (ASN) Task Force recommended calculation based on the Chronic Kidney Disease Epidemiology Collaboration (CKD-EPI) equation refit without adjustment for race.       Narrative:      GFR Normal >60  Chronic Kidney Disease <60  Kidney Failure <15      Blood Culture - Blood, Arm, Right [[id number]]  (Normal) Collected: 07/27/22 2304    Specimen: Blood from Arm, Right Updated: 07/31/22 2317     Blood Culture No growth at 4 days    Blood Culture - Blood, Arm, Left [[id number]]   (Normal) Collected: 07/27/22 2203    Specimen: Blood from Arm, Left Updated: 07/31/22 2217     Blood Culture No growth at 4 days     Gram Stain --     Comment: The previously reported stain <null> is no longer being reported.       POC Glucose Once [[id number]]  (Abnormal) Collected: 07/31/22 2142    Specimen: Blood Updated: 07/31/22 2145     Glucose 359 mg/dL      Comment: Serial Number: [id number]:  [id number]       POC Glucose Once [[id number]]  (Abnormal) Collected: 07/31/22 2037    Specimen: Blood Updated: 07/31/22 2038     Glucose 449 mg/dL      Comment: Serial Number: [id number]:  [id number]       POC Glucose Once [[id number]]  (Abnormal) Collected: 07/31/22 1856    Specimen: Blood Updated: 07/31/22 1858     Glucose 559 mg/dL      Comment: Serial Number: [id number]:  [id number]       POC Glucose Once [[id number]]  (Abnormal) Collected: 07/31/22 1638    Specimen: Blood Updated: 07/31/22 1640     Glucose 490 mg/dL      Comment: Serial Number: [id number]:  [id number]       POC Glucose Once [[id number]]  (Abnormal) Collected: 07/31/22 1627    Specimen: Blood Updated: 07/31/22 1633     Glucose 526 mg/dL      Comment: Serial Number: [id number]:  [id number]       POC Glucose Once [[id number]]  (Abnormal) Collected: 07/31/22 1200    Specimen: Blood Updated: 07/31/22 1201     Glucose 132 mg/dL      Comment: Serial Number: [id number]:  [id number]       POC Glucose Once [[id number]]  (Abnormal) Collected: 07/31/22 0742    Specimen: Blood Updated: 07/31/22 0743     Glucose 145 mg/dL      Comment: Serial Number: [id number]:  [id number]             PENDING RESULTS:     IMAGING REVIEWED:  No radiology results for the last day    Assessment & Plan   ASSESSMENT:  1. CML with hyperleukocytosis :  Has been initiated on IVF, steroids, allopurinol and hydroxyurea has been continued. Hx. of non-compliance with treatment.  Her white count is beginning to drop on hydroxyurea.  WBC increased to  135.6 today. Continue to monitor CBC daily    2. Significant anemia secondary to malignancy, hydroxyurea.  Patient receiving PRBC's as needed    3. History of PE: on rivaroxaban.  She will continue the above    4. Acute Resp Failure/Acute on Chronic CHF/Type 2 DM-managed per primary team    5. Anxiety/Depression    6. Chronic Pain     PLAN  1. Hydroxyurea to 1 gm BID  2. Check LDH  3. Consult Palliative Care to discuss goals of care  4. Transfuse PRBC's as needed  5. Daily CBCs  6. Continue supportive care                       This is a 46-year-old female well-known to our service.  Patient with history of CML, noncompliant with medicines including TKI's.  Patient with multiple hospitalizations.  Was in the hospital discharged on 7/8/2022 on hydroxyurea 500 mg daily.     Patient is admitted to the hospital with dyspnea.  CT of the chest shows groundglass opacifications which was secondary to inflammatory or atypical infectious process     Physical exam she appears chronically ill, she has rhonchi on exam     I have reviewed her labs, imaging studies and progress notes.  She has leukocytosis, hemoglobin 7.3 normal platelet counts with up to 18% blast forms and leukoerythroblastic changes  She has normal renal function tests, LDH is high at 1974, uric acid is 9 and phosphorus is normal.  She also has a normal calcium therefore no evidence of tumor lysis syndrome.     I have adjusted her dose of hydroxyurea to 1 g twice a day    Patient will remain on hydroxyurea at current doses  Continue to look for TKI options for her given her underlying cardiac disease.  Patient is still interested in pursuing treatment.  Her leukocytosis is stable.  Blast count is down to 14%.  She has no evidence of tumor lysis syndrome.  Continue to monitor her daily CBCs  Continue supportive care    Discussed with patient    Electronically signed by Meghann Lerma MD, 07/31/22, 12:08 PM EDT.

## 2022-08-01 NOTE — CONSULTS
Group: [facility]         CONSULT NOTE    Patient Identification:  Nieves Mc  46 y.o.  female  1975  [id number]            Requesting physician: Attending physician    Reason for Consultation: Recurrent respiratory failure      History of Present Illness: 46 y.o. female who presented to [facility] with a past medical history of CML leukemia with blast crisis, hypertension, anxiety and depression on chronic benzodiazepines, peripheral neuropathy, chronic pain syndrome narcotic dependent, cardiomyopathy with reduced ejection fraction and type 2 diabetes not well controlled on chronic insulin therapy.  The patient was recently admitted July 2022 with a CML blast crisis was treated in the hospital and discharged approximately 2 weeks ago.  Reviewing the notes from this hospitalization she was given fluids and blood which put her into volume overload they diuresed her and she had tumor lysis syndrome and almost had to be placed on dialysis.  She was asked to be given rasburicase.  Her white blood cells were down to the 40,000 range and was discharged to home and plan to follow-up with hematology oncology.  Reviewed the hematologist note who stated that any tyrosine kinase would probably work but it does not appear that she was given any of this medication on discharge and was discharged only on hydroxyurea.  She was previously on nilotinib but she states that this was discontinued because they thought this contributed to her cardiomyopathy.    On 7/27/2022 she presents with a 200,000 white blood cell coun with extensive blast t, of 13,000 BNP, peripheral edema, and CT scan that shows extensive groundglass opacities.  Her oxygen saturation dropped down to 87% on room air and normally is not on any oxygen.  However on her previous hospitalization she required oxygen.     A recent echocardiogram in June of this year showed a decreased ejection fraction of 40 to 45% and decreased  relaxation so this is a combined congestive heart failure.      In the ER she was given vancomycin, azithromycin, and cefepime and a 40 mg dose of IV Lasix.      Patient reports she does not have oxygen at home.       Assessment:  Recurrent respiratory failure w/hypoxia  CML blast crisis  Dyspnea  Hx tumor lysis syndrome  Hx COVID-19 in January 2022  Chronic pain  Nonischemic cardiomyopathy  Acute diastolic CHF  T2DM  H/O PE  Depression/anxiety    Chronic severe bilateral groundglass opacities noted on CT scans since January 2022 s/p COVID-19    Results for orders placed during the hospital encounter of 06/29/22    Adult Transthoracic Echo Complete W/ Cont if Necessary Per Protocol    Interpretation Summary  · The left ventricular cavity is mildly dilated.  · Left ventricular wall thickness is consistent with mild concentric hypertrophy.  · Left ventricular ejection fraction appears to be 41 - 45%.  · Left ventricular diastolic function is consistent with (grade Ia w/high LAP) impaired relaxation.        Last bronchoscopy 6/6/2022 for immunocompromised and groundglass opacities  - All cultures negative except fungal cultures positive for Candida species which is clinically insignificant  - No significant findings noted    Bone marrow biopsy 7/6/2022   -pending    .00 on admission 7/27/2022  Blood cultures negative at 4 days  MRSA and respiratory panel negative      Recommendations:  Start Protonix to eliminate possible reflux that is contributing to her cough  Overnight oximetry test- patient reports her O2 sats drop when she sleeps  Patient will need 6-minute walk prior to discharge  The CT scan of the chest is compatible with severe pneumonitis which has not improved since June , 2022 and appears to have worsened since January 2022 although all the cultures from the bronchoscopy did not point to an infectious etiology      Continue to titrate oxygen- Currently on 2L NC  Cefepime  Prednisone 40 mg  daily  Mucinex  DuoNebs as needed  Electrolyte/Glycemic control  DVT/GI Prophylaxis-Xarelto hx PE              Review of Sytems:  Review of Systems  Constitutional: Negative for chills, fever and positive malaise/fatigue.   HENT: Negative.    Eyes: Negative.    Cardiovascular: Negative.    Respiratory: Positive for chronic cough and shortness of breath with exertion.    Skin: Negative.    Musculoskeletal: Tender knot in her back that she believes is from coughing so much  Gastrointestinal: Negative.    Genitourinary: Negative.    Neurological: Negative.    Psychiatric/Behavioral: Negative.      Past Medical History:  Past Medical History:   Diagnosis Date   • Bone pain    • COVID-19 virus infection 2022   • Diabetes mellitus (HCC)    • Extremity pain     Carlin. legs pain   • Leg pain     left leg greater   • Migraine    • Pulmonary embolism (HCC)    • Vision loss     doing surgery       Past Surgical History:  Past Surgical History:   Procedure Laterality Date   • BONE MARROW BIOPSY     • BREAST SURGERY     • BRONCHOSCOPY N/A 2022    Procedure: BRONCHOSCOPY bilateral lung washing;  Surgeon: Charlene Camara MD;  Location: [facility] ENDOSCOPY;  Service: Pulmonary;  Laterality: N/A;  post: rule out infection vs transfusion lung injury   •  SECTION     • CHOLECYSTECTOMY     • EYE SURGERY      laser surgery due  to hemmorage--- 2021-- another surgery  lt eye11/15/21   • RETINAL DETACHMENT SURGERY     • SPINE SURGERY      Lombardi spinal block   • TUBAL ABDOMINAL LIGATION          Home Meds:  Medications Prior to Admission   Medication Sig Dispense Refill Last Dose   • allopurinol (ZYLOPRIM) 100 MG tablet Take 1 tablet by mouth Daily. 30 tablet 0    • ALPRAZolam (Xanax) 0.25 MG tablet Take 1 tablet by mouth At Night As Needed for Anxiety. 30 tablet 0    • citalopram (CeleXA) 10 MG tablet TAKE 1 TABLET BY MOUTH DAILY. TO BEGIN 22 30 tablet 1    • folic acid (FOLVITE) 1 MG tablet Take 1 tablet by mouth  "Daily. 30 tablet 0    • guaiFENesin (MUCINEX) 600 MG 12 hr tablet Take 1 tablet by mouth Every 12 (Twelve) Hours. 30 tablet 0    • hydroxyurea (HYDREA) 500 MG capsule Take 1 capsule by mouth Daily. 60 capsule 0    • Insulin Glargine (BASAGLAR KWIKPEN) 100 UNIT/ML injection pen Inject 20 Units under the skin into the appropriate area as directed Daily. 10 mL 3    • Insulin Lispro (ADMELOG SOLOSTAR) 100 UNIT/ML injection pen Inject 6 Units under the skin into the appropriate area as directed 3 (Three) Times a Day. 3 mL 3    • losartan (COZAAR) 25 MG tablet Take 1 tablet by mouth Daily. 30 tablet 0    • metoprolol succinate XL (TOPROL-XL) 25 MG 24 hr tablet Take 25 mg by mouth Daily.      • oxyCODONE-acetaminophen (PERCOCET)  MG per tablet Take 1 tablet by mouth Every 8 (Eight) Hours As Needed for Moderate Pain .      • rivaroxaban (XARELTO) 20 MG tablet Take 1 tablet by mouth Daily. 90 tablet 0 7/27/2022   • traZODone (DESYREL) 50 MG tablet Take 50 mg by mouth Every Night.      • nilotinib (TASIGNA) 150 MG capsule capsule Take 150 mg by mouth 2 (Two) Times a Day Before Meals.          Allergies:  Allergies   Allergen Reactions   • Hydromorphone GI Intolerance     dilaudid   • Morphine Nausea And Vomiting   • Propofol Hives     Previously tolerated being premedicated with diphenhydramine and famotadine       Social History:   Social History     Socioeconomic History   • Marital status:    Tobacco Use   • Smoking status: Never Smoker   • Smokeless tobacco: Never Used   Vaping Use   • Vaping Use: Never used   Substance and Sexual Activity   • Alcohol use: No   • Drug use: No   • Sexual activity: Defer       Family History:  Family History   Problem Relation Age of Onset   • Diabetes Mother    • Diabetes Maternal Grandmother    • Heart attack Maternal Grandmother    • Stroke Maternal Grandmother        Physical Exam:  /77   Pulse 113   Temp 97.8 °F (36.6 °C) (Oral)   Resp 20   Ht 162.6 cm (64\")   " Wt 61.3 kg (135 lb 2.3 oz)   SpO2 91%   BMI 23.20 kg/m²  Body mass index is 23.2 kg/m². 91% 61.3 kg (135 lb 2.3 oz)  Physical Exam    LABS:  Lab Results   Component Value Date    CALCIUM 8.8 08/01/2022    PHOS 2.8 07/31/2022     Results from last 7 days   Lab Units 08/01/22  0326 07/31/22  0327 07/30/22  0256 07/28/22  0822 07/27/22 2109   MAGNESIUM mg/dL  --  2.1 2.0  --   --    SODIUM mmol/L 136 139 137   < > 136   POTASSIUM mmol/L 4.5 4.1 3.6   < > 3.9   CHLORIDE mmol/L 100 101 100   < > 98   CO2 mmol/L 24.0 26.0 27.0   < > 23.0   BUN mg/dL 26* 25* 26*   < > 10   CREATININE mg/dL 0.54* 0.60 0.56*   < > 0.55*   GLUCOSE mg/dL 285* 261* 307*   < > 367*   CALCIUM mg/dL 8.8 9.0 9.0   < > 9.4   WBC 10*3/mm3 115.70* 135.60* 132.00*   < > 215.00*   HEMOGLOBIN g/dL 8.1* 8.4* 6.7*   < > 8.5*   PLATELETS 10*3/mm3 121* 156 160   < > 306   ALT (SGPT) U/L 27 34* 20  --  22   AST (SGOT) U/L 20 29 12  --  21   PROBNP pg/mL  --   --   --   --  13,362.0*   PROCALCITONIN ng/mL  --   --  0.21  --   --     < > = values in this interval not displayed.     Lab Results   Component Value Date    CKTOTAL 19 (L) 07/27/2022    TROPONINI <0.03 10/11/2018    TROPONINT <0.010 07/27/2022     Results from last 7 days   Lab Units 07/27/22 2109   CK TOTAL U/L 19*   TROPONIN T ng/mL <0.010     Results from last 7 days   Lab Units 07/27/22  2304 07/27/22  2203   BLOODCX  No growth at 4 days No growth at 4 days     Results from last 7 days   Lab Units 07/30/22  0256 07/27/22  2205   PROCALCITONIN ng/mL 0.21  --    LACTATE mmol/L  --  1.7     Results from last 7 days   Lab Units 07/27/22  2112   PH, ARTERIAL pH units 7.484*   PCO2, ARTERIAL mm Hg 29.5*   PO2 ART mm Hg 57.7*   O2 SATURATION ART % 92.0*   MODALITY  Cannula     Results from last 7 days   Lab Units 07/27/22  2142   ADENOVIRUS DETECTION BY PCR  Not Detected   CORONAVIRUS 229E  Not Detected   CORONAVIRUS HKU1  Not Detected   CORONAVIRUS NL63  Not Detected   CORONAVIRUS OC43  Not  Detected   HUMAN METAPNEUMOVIRUS  Not Detected   HUMAN RHINOVIRUS/ENTEROVIRUS  Not Detected   INFLUENZA B PCR  Not Detected   PARAINFLUENZA 1  Not Detected   PARAINFLUENZA VIRUS 2  Not Detected   PARAINFLUENZA VIRUS 3  Not Detected   PARAINFLUENZA VIRUS 4  Not Detected   BORDETELLA PERTUSSIS PCR  Not Detected   CHLAMYDOPHILA PNEUMONIAE PCR  Not Detected   MYCOPLAMA PNEUMO PCR  Not Detected   INFLUENZA A PCR  Not Detected   RSV, PCR  Not Detected         Results from last 7 days   Lab Units 07/27/22  2304 07/27/22  2203   BLOODCX  No growth at 4 days No growth at 4 days     Lab Results   Component Value Date    TSH 1.070 01/13/2022     Estimated Creatinine Clearance: 126 mL/min (A) (by C-G formula based on SCr of 0.54 mg/dL (L)).         Imaging:  Imaging Results (Last 24 Hours)     ** No results found for the last 24 hours. **            Current Meds:   SCHEDULE  allopurinol, 300 mg, Oral, Daily  cefepime, 2 g, Intravenous, Q8H  citalopram, 10 mg, Oral, Daily  folic acid, 1 mg, Oral, Daily  gabapentin, 300 mg, Oral, TID  guaiFENesin, 600 mg, Oral, Q12H  hydroxyurea, 1,000 mg, Oral, BID  insulin glargine, 20 Units, Subcutaneous, Nightly  insulin lispro, 0-14 Units, Subcutaneous, TID AC  insulin lispro, 5 Units, Subcutaneous, TID With Meals  insulin regular, 8 Units, Subcutaneous, Daily With Breakfast  losartan, 25 mg, Oral, Q24H  metoprolol succinate XL, 25 mg, Oral, Q24H  mirtazapine, 15 mg, Oral, Nightly  predniSONE, 40 mg, Oral, Daily With Breakfast  rivaroxaban, 20 mg, Oral, Daily With Dinner  sodium chloride, 10 mL, Intravenous, Q12H  traZODone, 50 mg, Oral, Nightly      Infusions     PRNs  •  acetaminophen **OR** acetaminophen **OR** acetaminophen  •  ALPRAZolam  •  aluminum-magnesium hydroxide-simethicone  •  dextrose  •  dextrose  •  glucagon (human recombinant)  •  hydrALAZINE  •  HYDROcodone-acetaminophen  •  insulin lispro **AND** insulin lispro  •  ipratropium-albuterol  •  melatonin  •  nitroglycerin  •   ondansetron **OR** ondansetron  •  oxyCODONE  •  [COMPLETED] Insert peripheral IV **AND** sodium chloride  •  sodium chloride        Sofia Manish, APRN  8/1/2022  11:14 EDT    The NP scribed the note for me, I have examined the patient and reviewed and verified the above findings and and I formulated the assessment and plan as documented    Much of this encounter note is an electronic transcription/translation of spoken language to printed text using Dragon Software.

## 2022-08-01 NOTE — PLAN OF CARE
Goal Outcome Evaluation:  Resting comfortably. Pulm consult today, 6min walk and overnight walk ordered. Pain controlled with PRN medication.

## 2022-08-01 NOTE — CASE MANAGEMENT/SOCIAL WORK
Continued Stay Note  ZOHAIB Amor     Patient Name: Nieves Mc  MRN: [id number]  Today's Date: 8/1/2022    Admit Date: 7/27/2022     Discharge Plan     Row Name 08/01/22 1239       Plan    Plan Home with family. Palliative following. Watch O2 needs.    Plan Comments Patient will discharge home with family. Palliative following. Watch O2 needs. D/C barriers: low hgb, pulm consulted, 1.5L NC, hem/onc following, IV abx.              Phone communication or documentation only - no physical contact with patient or family.      Sweta Chawla RN

## 2022-08-01 NOTE — PLAN OF CARE
Goal Outcome Evaluation:   Pt blood sugar was above 400, prn insulin was given and MD contacted, 2hr blood sugar post prn insulin was below 400. Pt is tachycardic, heart rate 100 to one teens, vitals otherwise stable. Lower back pain controled with prn oxycodone.,

## 2022-08-02 ENCOUNTER — TELEPHONE (OUTPATIENT)
Dept: PAIN MEDICINE | Facility: CLINIC | Age: 47
End: 2022-08-02

## 2022-08-02 LAB
ALBUMIN SERPL-MCNC: 3.7 G/DL (ref 3.5–5.2)
ALBUMIN/GLOB SERPL: 1.2 G/DL
ALP SERPL-CCNC: 609 U/L (ref 39–117)
ALT SERPL W P-5'-P-CCNC: 35 U/L (ref 1–33)
ANION GAP SERPL CALCULATED.3IONS-SCNC: 11 MMOL/L (ref 5–15)
ANISOCYTOSIS BLD QL: ABNORMAL
AST SERPL-CCNC: 28 U/L (ref 1–32)
BASOPHILS # BLD MANUAL: 13.03 10*3/MM3 (ref 0–0.2)
BASOPHILS NFR BLD MANUAL: 12 % (ref 0–1.5)
BILIRUB SERPL-MCNC: 1.1 MG/DL (ref 0–1.2)
BLASTS NFR BLD MANUAL: 15 % (ref 0–0)
BUN SERPL-MCNC: 29 MG/DL (ref 6–20)
BUN/CREAT SERPL: 41.4 (ref 7–25)
CALCIUM SPEC-SCNC: 9.1 MG/DL (ref 8.6–10.5)
CHLORIDE SERPL-SCNC: 101 MMOL/L (ref 98–107)
CO2 SERPL-SCNC: 25 MMOL/L (ref 22–29)
CREAT SERPL-MCNC: 0.7 MG/DL (ref 0.57–1)
CRP SERPL-MCNC: 0.44 MG/DL (ref 0–0.5)
DEPRECATED RDW RBC AUTO: 63.4 FL (ref 37–54)
DOHLE BODIES: PRESENT
EGFRCR SERPLBLD CKD-EPI 2021: 108.2 ML/MIN/1.73
EOSINOPHIL # BLD MANUAL: 6.52 10*3/MM3 (ref 0–0.4)
EOSINOPHIL NFR BLD MANUAL: 6 % (ref 0.3–6.2)
ERYTHROCYTE [DISTWIDTH] IN BLOOD BY AUTOMATED COUNT: 21.7 % (ref 12.3–15.4)
ERYTHROCYTE [SEDIMENTATION RATE] IN BLOOD: 21 MM/HR (ref 0–20)
GLOBULIN UR ELPH-MCNC: 3.2 GM/DL
GLUCOSE BLDC GLUCOMTR-MCNC: 180 MG/DL (ref 70–105)
GLUCOSE BLDC GLUCOMTR-MCNC: 197 MG/DL (ref 70–105)
GLUCOSE BLDC GLUCOMTR-MCNC: 485 MG/DL (ref 70–105)
GLUCOSE BLDC GLUCOMTR-MCNC: 493 MG/DL (ref 70–105)
GLUCOSE SERPL-MCNC: 231 MG/DL (ref 65–99)
HCT VFR BLD AUTO: 30.5 % (ref 34–46.6)
HGB BLD-MCNC: 9 G/DL (ref 12–15.9)
LYMPHOCYTES # BLD MANUAL: 6.52 10*3/MM3 (ref 0.7–3.1)
LYMPHOCYTES NFR BLD MANUAL: 1 % (ref 5–12)
MAGNESIUM SERPL-MCNC: 2.1 MG/DL (ref 1.6–2.6)
MCH RBC QN AUTO: 25 PG (ref 26.6–33)
MCHC RBC AUTO-ENTMCNC: 29.4 G/DL (ref 31.5–35.7)
MCV RBC AUTO: 84.9 FL (ref 79–97)
METAMYELOCYTES NFR BLD MANUAL: 4 % (ref 0–0)
MONOCYTES # BLD: 1.09 10*3/MM3 (ref 0.1–0.9)
MYELOCYTES NFR BLD MANUAL: 6 % (ref 0–0)
NEUTROPHILS # BLD AUTO: 48.87 10*3/MM3 (ref 1.7–7)
NEUTROPHILS NFR BLD MANUAL: 40 % (ref 42.7–76)
NEUTS BAND NFR BLD MANUAL: 5 % (ref 0–5)
NRBC SPEC MANUAL: 2 /100 WBC (ref 0–0.2)
PHOSPHATE SERPL-MCNC: 4.7 MG/DL (ref 2.5–4.5)
PLAT MORPH BLD: NORMAL
PLATELET # BLD AUTO: 104 10*3/MM3 (ref 140–450)
PMV BLD AUTO: 7 FL (ref 6–12)
POIKILOCYTOSIS BLD QL SMEAR: ABNORMAL
POLYCHROMASIA BLD QL SMEAR: ABNORMAL
POTASSIUM SERPL-SCNC: 4.4 MMOL/L (ref 3.5–5.2)
PROMYELOCYTES NFR BLD MANUAL: 5 % (ref 0–0)
PROT SERPL-MCNC: 6.9 G/DL (ref 6–8.5)
RBC # BLD AUTO: 3.59 10*6/MM3 (ref 3.77–5.28)
SCAN SLIDE: NORMAL
SODIUM SERPL-SCNC: 137 MMOL/L (ref 136–145)
VARIANT LYMPHS NFR BLD MANUAL: 6 % (ref 19.6–45.3)
WBC NRBC COR # BLD: 108.6 10*3/MM3 (ref 3.4–10.8)

## 2022-08-02 PROCEDURE — 80053 COMPREHEN METABOLIC PANEL: CPT | Performed by: INTERNAL MEDICINE

## 2022-08-02 PROCEDURE — 99232 SBSQ HOSP IP/OBS MODERATE 35: CPT | Performed by: INTERNAL MEDICINE

## 2022-08-02 PROCEDURE — 82962 GLUCOSE BLOOD TEST: CPT

## 2022-08-02 PROCEDURE — 83735 ASSAY OF MAGNESIUM: CPT | Performed by: INTERNAL MEDICINE

## 2022-08-02 PROCEDURE — 85007 BL SMEAR W/DIFF WBC COUNT: CPT | Performed by: NURSE PRACTITIONER

## 2022-08-02 PROCEDURE — 63710000001 INSULIN LISPRO (HUMAN) PER 5 UNITS: Performed by: INTERNAL MEDICINE

## 2022-08-02 PROCEDURE — 63710000001 INSULIN LISPRO (HUMAN) PER 5 UNITS: Performed by: HOSPITALIST

## 2022-08-02 PROCEDURE — 84100 ASSAY OF PHOSPHORUS: CPT | Performed by: INTERNAL MEDICINE

## 2022-08-02 PROCEDURE — 25010000002 METHYLPREDNISOLONE PER 40 MG: Performed by: NURSE PRACTITIONER

## 2022-08-02 PROCEDURE — 85025 COMPLETE CBC W/AUTO DIFF WBC: CPT | Performed by: NURSE PRACTITIONER

## 2022-08-02 PROCEDURE — 86140 C-REACTIVE PROTEIN: CPT | Performed by: INTERNAL MEDICINE

## 2022-08-02 PROCEDURE — 85652 RBC SED RATE AUTOMATED: CPT | Performed by: INTERNAL MEDICINE

## 2022-08-02 PROCEDURE — 63710000001 INSULIN REGULAR HUMAN PER 5 UNITS: Performed by: INTERNAL MEDICINE

## 2022-08-02 PROCEDURE — 25010000002 CEFEPIME PER 500 MG: Performed by: INTERNAL MEDICINE

## 2022-08-02 PROCEDURE — 63710000001 INSULIN GLARGINE PER 5 UNITS: Performed by: INTERNAL MEDICINE

## 2022-08-02 PROCEDURE — 94618 PULMONARY STRESS TESTING: CPT

## 2022-08-02 RX ORDER — INSULIN LISPRO 100 [IU]/ML
0-24 INJECTION, SOLUTION INTRAVENOUS; SUBCUTANEOUS
Status: DISCONTINUED | OUTPATIENT
Start: 2022-08-02 | End: 2022-08-05 | Stop reason: HOSPADM

## 2022-08-02 RX ORDER — METHYLPREDNISOLONE SODIUM SUCCINATE 40 MG/ML
40 INJECTION, POWDER, LYOPHILIZED, FOR SOLUTION INTRAMUSCULAR; INTRAVENOUS EVERY 6 HOURS
Status: DISCONTINUED | OUTPATIENT
Start: 2022-08-02 | End: 2022-08-05 | Stop reason: HOSPADM

## 2022-08-02 RX ADMIN — FOLIC ACID 1 MG: 1 TABLET ORAL at 08:03

## 2022-08-02 RX ADMIN — HYDROXYUREA 1000 MG: 500 CAPSULE ORAL at 08:04

## 2022-08-02 RX ADMIN — GUAIFENESIN 600 MG: 600 TABLET, EXTENDED RELEASE ORAL at 21:08

## 2022-08-02 RX ADMIN — ALPRAZOLAM 0.25 MG: 0.5 TABLET ORAL at 21:08

## 2022-08-02 RX ADMIN — HYDROXYUREA 1000 MG: 500 CAPSULE ORAL at 21:08

## 2022-08-02 RX ADMIN — METHYLPREDNISOLONE SODIUM SUCCINATE 40 MG: 40 INJECTION, POWDER, FOR SOLUTION INTRAMUSCULAR; INTRAVENOUS at 15:29

## 2022-08-02 RX ADMIN — GABAPENTIN 300 MG: 300 CAPSULE ORAL at 15:30

## 2022-08-02 RX ADMIN — INSULIN HUMAN 6 UNITS: 100 INJECTION, SOLUTION PARENTERAL at 08:02

## 2022-08-02 RX ADMIN — LOSARTAN POTASSIUM 25 MG: 25 TABLET, FILM COATED ORAL at 08:04

## 2022-08-02 RX ADMIN — INSULIN LISPRO 24 UNITS: 100 INJECTION, SOLUTION INTRAVENOUS; SUBCUTANEOUS at 21:08

## 2022-08-02 RX ADMIN — INSULIN LISPRO 3 UNITS: 100 INJECTION, SOLUTION INTRAVENOUS; SUBCUTANEOUS at 12:05

## 2022-08-02 RX ADMIN — MIRTAZAPINE 15 MG: 15 TABLET, FILM COATED ORAL at 21:08

## 2022-08-02 RX ADMIN — OXYCODONE 10 MG: 5 TABLET ORAL at 21:08

## 2022-08-02 RX ADMIN — INSULIN LISPRO 5 UNITS: 100 INJECTION, SOLUTION INTRAVENOUS; SUBCUTANEOUS at 17:29

## 2022-08-02 RX ADMIN — INSULIN GLARGINE 25 UNITS: 100 INJECTION, SOLUTION SUBCUTANEOUS at 21:11

## 2022-08-02 RX ADMIN — METHYLPREDNISOLONE SODIUM SUCCINATE 40 MG: 40 INJECTION, POWDER, FOR SOLUTION INTRAMUSCULAR; INTRAVENOUS at 21:07

## 2022-08-02 RX ADMIN — INSULIN HUMAN 16 UNITS: 100 INJECTION, SOLUTION PARENTERAL at 18:21

## 2022-08-02 RX ADMIN — CITALOPRAM HYDROBROMIDE 10 MG: 20 TABLET ORAL at 08:04

## 2022-08-02 RX ADMIN — CEFEPIME HYDROCHLORIDE 2 G: 2 INJECTION, POWDER, FOR SOLUTION INTRAVENOUS at 01:12

## 2022-08-02 RX ADMIN — RIVAROXABAN 20 MG: 20 TABLET, FILM COATED ORAL at 17:37

## 2022-08-02 RX ADMIN — METHYLPREDNISOLONE SODIUM SUCCINATE 40 MG: 40 INJECTION, POWDER, FOR SOLUTION INTRAMUSCULAR; INTRAVENOUS at 08:03

## 2022-08-02 RX ADMIN — METOPROLOL SUCCINATE 25 MG: 25 TABLET, EXTENDED RELEASE ORAL at 08:03

## 2022-08-02 RX ADMIN — CEFEPIME HYDROCHLORIDE 2 G: 2 INJECTION, POWDER, FOR SOLUTION INTRAVENOUS at 17:28

## 2022-08-02 RX ADMIN — PANTOPRAZOLE SODIUM 40 MG: 40 TABLET, DELAYED RELEASE ORAL at 05:35

## 2022-08-02 RX ADMIN — INSULIN LISPRO 24 UNITS: 100 INJECTION, SOLUTION INTRAVENOUS; SUBCUTANEOUS at 17:29

## 2022-08-02 RX ADMIN — GABAPENTIN 300 MG: 300 CAPSULE ORAL at 21:07

## 2022-08-02 RX ADMIN — Medication 10 ML: at 21:00

## 2022-08-02 RX ADMIN — INSULIN HUMAN 8 UNITS: 100 INJECTION, SOLUTION PARENTERAL at 15:29

## 2022-08-02 RX ADMIN — TRAZODONE HYDROCHLORIDE 50 MG: 50 TABLET ORAL at 21:07

## 2022-08-02 RX ADMIN — Medication 5 MG: at 21:08

## 2022-08-02 RX ADMIN — CEFEPIME HYDROCHLORIDE 2 G: 2 INJECTION, POWDER, FOR SOLUTION INTRAVENOUS at 08:03

## 2022-08-02 RX ADMIN — INSULIN LISPRO 5 UNITS: 100 INJECTION, SOLUTION INTRAVENOUS; SUBCUTANEOUS at 12:06

## 2022-08-02 RX ADMIN — ALLOPURINOL 300 MG: 300 TABLET ORAL at 08:04

## 2022-08-02 RX ADMIN — INSULIN LISPRO 3 UNITS: 100 INJECTION, SOLUTION INTRAVENOUS; SUBCUTANEOUS at 08:02

## 2022-08-02 RX ADMIN — Medication 10 ML: at 08:02

## 2022-08-02 RX ADMIN — OXYCODONE 10 MG: 5 TABLET ORAL at 05:35

## 2022-08-02 RX ADMIN — INSULIN LISPRO 5 UNITS: 100 INJECTION, SOLUTION INTRAVENOUS; SUBCUTANEOUS at 08:02

## 2022-08-02 RX ADMIN — GUAIFENESIN 600 MG: 600 TABLET, EXTENDED RELEASE ORAL at 08:02

## 2022-08-02 RX ADMIN — GABAPENTIN 300 MG: 300 CAPSULE ORAL at 08:04

## 2022-08-02 NOTE — NURSING NOTE
Patient sustaining sat well below 88 . Called resp to confirm how low and for how long the patient had to be below 90% on RA during overnight ox and she stated it was ok to place patient on 2L per NC and make a note of it.  2L placed at this time

## 2022-08-02 NOTE — PROGRESS NOTES
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)    Depression with anxiety    History of pulmonary embolism    Type 2 diabetes mellitus with diabetic polyneuropathy, with long-term current use of insulin (HCC)    Acute diastolic CHF (congestive heart failure) (HCC)    NICM (nonischemic cardiomyopathy) ([facility])    Dyspnea, unspecified type    Chronic pain    Narcotic dependence (HCC)    Acute respiratory failure with hypoxia ([facility])      Assessment  Recurrent respiratory failure w/hypoxia  CML blast crisis  Dyspnea  Hx tumor lysis syndrome  Hx COVID-19 in January 2022  Chronic pain  Nonischemic cardiomyopathy  Acute diastolic CHF  T2DM  H/O PE  Depression/anxiety     Chronic severe bilateral groundglass opacities noted on CT scans since January 2022 s/p COVID-19     Results for orders placed during the hospital encounter of 06/29/22     Adult Transthoracic Echo Complete W/ Cont if Necessary Per Protocol     Interpretation Summary  · The left ventricular cavity is mildly dilated.  · Left ventricular wall thickness is consistent with mild concentric hypertrophy.  · Left ventricular ejection fraction appears to be 41 - 45%.  · Left ventricular diastolic function is consistent with (grade Ia w/high LAP) impaired relaxation.           Last bronchoscopy 6/6/2022 for immunocompromised and groundglass opacities  - All cultures negative except fungal cultures positive for Candida species which is clinically insignificant  - No significant findings noted     Bone marrow biopsy 7/6/2022   -pending     .00 on admission 7/27/2022-currently 108.60  Blood cultures negative   MRSA and respiratory panel negative        Plan  Increase prednisone to 40 mg 3 times daily for 5 days followed by twice daily for 5 days then 40 mg daily until seen outpatient    Overnight oximetry test:  -Patient O2 sat dropped below 88% and was placed on 2 L nasal cannula     Continue to titrate oxygen- Currently on 2L  NC  Cefepime  Mucinex  DuoNebs as needed  Electrolyte/Glycemic control  DVT/GI Prophylaxis-Xarelto hx PE and Protonix  Palliative following     Patient will need 6-minute walk prior to discharge    The CT scan of the chest is compatible with severe pneumonitis which has not improved since June , 2022 and appears to have worsened since January 2022 although all the cultures from the bronchoscopy did not point to an infectious etiology        LOS: 5 days     Subjective     Patient resting in bed quietly.  In no distress at this time.  Vital signs stable on monitor.  Patient reports breathing is okay at this moment.  Cough continues.  Informed patient she has chronic pneumonitis that is causing her the shortness of breath and cough.  Her high WBCs may be contributing to this.  No current concerns for pulmonary infection.    Objective     Vital signs for last 24 hours:  Vitals:    08/02/22 0127 08/02/22 0521 08/02/22 0808 08/02/22 0931   BP: 141/84 131/79 130/80 113/70   Pulse: 120 110 107 108   Resp: 19 19 18   Temp: 97.6 °F (36.4 °C) 97.9 °F (36.6 °C)  98 °F (36.7 °C)   TempSrc: Oral Oral  Oral   SpO2: 98% 95% 98% 98%   Weight:  61.4 kg (135 lb 5.8 oz)     Height:           Intake/Output last 3 shifts:  I/O last 3 completed shifts:  In: 2120 [P.O.:1920; IV Piggyback:200]  Out: -   Intake/Output this shift:  I/O this shift:  In: 240 [P.O.:240]  Out: -       Radiology  Imaging Results (Last 24 Hours)       ** No results found for the last 24 hours. **            Labs:  Results from last 7 days   Lab Units 08/02/22  0644   WBC 10*3/mm3 108.60*   HEMOGLOBIN g/dL 9.0*   HEMATOCRIT % 30.5*   PLATELETS 10*3/mm3 104*     Results from last 7 days   Lab Units 08/02/22  0644   SODIUM mmol/L 137   POTASSIUM mmol/L 4.4   CHLORIDE mmol/L 101   CO2 mmol/L 25.0   BUN mg/dL 29*   CREATININE mg/dL 0.70   CALCIUM mg/dL 9.1   BILIRUBIN mg/dL 1.1   ALK PHOS U/L 609*   ALT (SGPT) U/L 35*   AST (SGOT) U/L 28   GLUCOSE mg/dL 231*      Results from last 7 days   Lab Units 07/27/22  2112   PH, ARTERIAL pH units 7.484*   PO2 ART mm Hg 57.7*   PCO2, ARTERIAL mm Hg 29.5*   HCO3 ART mmol/L 22.2     Results from last 7 days   Lab Units 08/02/22  0644 08/01/22  0326 07/31/22  0327   ALBUMIN g/dL 3.70 3.50 3.90     Results from last 7 days   Lab Units 07/27/22  2109   CK TOTAL U/L 19*   TROPONIN T ng/mL <0.010         Results from last 7 days   Lab Units 08/02/22  0644   MAGNESIUM mg/dL 2.1                   Meds:   SCHEDULE  allopurinol, 300 mg, Oral, Daily  cefepime, 2 g, Intravenous, Q8H  citalopram, 10 mg, Oral, Daily  folic acid, 1 mg, Oral, Daily  gabapentin, 300 mg, Oral, TID  guaiFENesin, 600 mg, Oral, Q12H  hydroxyurea, 1,000 mg, Oral, BID  insulin glargine, 20 Units, Subcutaneous, Nightly  insulin lispro, 0-14 Units, Subcutaneous, TID AC  insulin lispro, 5 Units, Subcutaneous, TID With Meals  insulin regular, 6 Units, Subcutaneous, Q6H  losartan, 25 mg, Oral, Q24H  methylPREDNISolone sodium succinate, 40 mg, Intravenous, Q6H  metoprolol succinate XL, 25 mg, Oral, Q24H  mirtazapine, 15 mg, Oral, Nightly  pantoprazole, 40 mg, Oral, Q AM  rivaroxaban, 20 mg, Oral, Daily With Dinner  sodium chloride, 10 mL, Intravenous, Q12H  traZODone, 50 mg, Oral, Nightly      Infusions     PRNs    acetaminophen **OR** acetaminophen **OR** acetaminophen    ALPRAZolam    aluminum-magnesium hydroxide-simethicone    dextrose    dextrose    glucagon (human recombinant)    hydrALAZINE    HYDROcodone-acetaminophen    insulin lispro **AND** insulin lispro    ipratropium-albuterol    melatonin    nitroglycerin    ondansetron **OR** ondansetron    oxyCODONE    [COMPLETED] Insert peripheral IV **AND** sodium chloride    sodium chloride    Physical Exam:  Physical Exam  General Appearance:  Alert. No distress noted.  Thin.  HEENT:  Normocephalic, without obvious abnormality, Conjunctiva/corneas clear,.  Normal external ear canals, Nares normal, no drainage     Neck:   Supple, symmetrical, trachea midline. No JVD.  Lungs /Chest wall: Few basal rales bilaterally and slightly diminished bases bilaterally, respirations unlabored symmetrical wall movement.     Heart:  Regular rate and rhythm, systolic murmur PMI left sternal border  Abdomen: Soft, non-tender, no masses, no organomegaly.    Extremities: No edema, no clubbing or cyanosis      ROS  Review of Systems  Constitutional: Negative for chills, fever and positive malaise/fatigue.   HENT: Negative.    Eyes: Negative.    Cardiovascular: Negative.    Respiratory: Positive for chronic cough and shortness of breath with exertion.    Skin: Negative.    Musculoskeletal: Tender knot in her back that she believes is from coughing so much  Gastrointestinal: Negative.    Genitourinary: Negative.    Neurological: Negative.    Psychiatric/Behavioral: Negative.            I have reviewed current clinicals.     Electronically signed by JP White, 08/02/22, 9:54 AM EDT.     The NP scribed the note for me, I have examined the patient and reviewed and verified the above findings and and I formulated the assessment and plan as documented

## 2022-08-02 NOTE — PLAN OF CARE
Problem: Pain Acute  Goal: Acceptable Pain Control and Functional Ability  Outcome: Ongoing, Progressing  Intervention: Prevent or Manage Pain  Recent Flowsheet Documentation  Taken 8/2/2022 0400 by Karissa Ware RN  Medication Review/Management: medications reviewed  Taken 8/2/2022 0200 by Karissa Ware RN  Medication Review/Management: medications reviewed  Taken 8/2/2022 0000 by Karissa Ware RN  Medication Review/Management: medications reviewed  Taken 8/1/2022 2200 by Karissa Ware RN  Medication Review/Management: medications reviewed  Taken 8/1/2022 2000 by Karissa Ware RN  Sensory Stimulation Regulation: care clustered  Medication Review/Management: medications reviewed  Intervention: Optimize Psychosocial Wellbeing  Recent Flowsheet Documentation  Taken 8/1/2022 2000 by Karissa Ware RN  Diversional Activities:   television   smartphone     Problem: Adult Inpatient Plan of Care  Goal: Plan of Care Review  Outcome: Ongoing, Progressing  Goal: Patient-Specific Goal (Individualized)  Outcome: Ongoing, Progressing  Goal: Absence of Hospital-Acquired Illness or Injury  Outcome: Ongoing, Progressing  Intervention: Identify and Manage Fall Risk  Recent Flowsheet Documentation  Taken 8/2/2022 0400 by Karissa Ware RN  Safety Promotion/Fall Prevention: safety round/check completed  Taken 8/2/2022 0200 by Karissa Ware RN  Safety Promotion/Fall Prevention:   safety round/check completed   nonskid shoes/slippers when out of bed  Taken 8/2/2022 0000 by Karissa Ware RN  Safety Promotion/Fall Prevention:   safety round/check completed   nonskid shoes/slippers when out of bed  Taken 8/1/2022 2200 by Karissa Ware RN  Safety Promotion/Fall Prevention:   safety round/check completed   nonskid shoes/slippers when out of bed  Taken 8/1/2022 2000 by Karissa Ware RN  Safety Promotion/Fall Prevention:   safety round/check completed   nonskid shoes/slippers  when out of bed   fall prevention program maintained  Intervention: Prevent and Manage VTE (Venous Thromboembolism) Risk  Recent Flowsheet Documentation  Taken 8/1/2022 2000 by Karissa Ware RN  Range of Motion: active ROM (range of motion) encouraged  Intervention: Prevent Infection  Recent Flowsheet Documentation  Taken 8/2/2022 0400 by Karissa Ware RN  Infection Prevention: single patient room provided  Taken 8/2/2022 0200 by Karissa Ware RN  Infection Prevention:   single patient room provided   hand hygiene promoted   equipment surfaces disinfected  Taken 8/2/2022 0000 by Karissa Ware RN  Infection Prevention:   single patient room provided   hand hygiene promoted   equipment surfaces disinfected  Taken 8/1/2022 2200 by Karissa Ware RN  Infection Prevention:   single patient room provided   hand hygiene promoted  Taken 8/1/2022 2000 by Karissa Ware RN  Infection Prevention:   single patient room provided   hand hygiene promoted   equipment surfaces disinfected  Goal: Optimal Comfort and Wellbeing  Outcome: Ongoing, Progressing  Intervention: Provide Person-Centered Care  Recent Flowsheet Documentation  Taken 8/1/2022 2000 by Karissa Ware RN  Trust Relationship/Rapport:   care explained   questions answered  Goal: Readiness for Transition of Care  Outcome: Ongoing, Progressing   Goal Outcome Evaluation:      Care plan reviewed with patient overnight ox performed and patient was placed on 2L per NC due to low o2 sat during test. Patient is also scheduled to have a 6 min walk prior to d/c.

## 2022-08-02 NOTE — TELEPHONE ENCOUNTER
Caller: SAMUEL    Relationship to patient: MOTHER    Best call back number: [phone]    Patient is needing: PATIENT GOT MED REFILL FOR 1 A DAY AND IT SHOULD HAVE BEEN 3 A DAY OF OXYCODONE  PLEASE ADVISE

## 2022-08-02 NOTE — PLAN OF CARE
Problem: Pain Acute  Goal: Acceptable Pain Control and Functional Ability  8/2/2022 1336 by Bonnie Ernst RN  Outcome: Ongoing, Progressing  8/2/2022 1335 by Bonnie Ernst RN  Outcome: Ongoing, Progressing  Intervention: Prevent or Manage Pain  Recent Flowsheet Documentation  Taken 8/2/2022 0815 by Bonnie Ernst RN  Sensory Stimulation Regulation: care clustered  Bowel Elimination Promotion: adequate fluid intake promoted  Sleep/Rest Enhancement: awakenings minimized  Medication Review/Management: medications reviewed  Intervention: Optimize Psychosocial Wellbeing  Recent Flowsheet Documentation  Taken 8/2/2022 0815 by Bonnie Ernst RN  Supportive Measures: active listening utilized  Diversional Activities:   television   smartphone     Problem: Adult Inpatient Plan of Care  Goal: Plan of Care Review  8/2/2022 1336 by Bonnie Ernst RN  Outcome: Ongoing, Progressing  Flowsheets  Taken 8/2/2022 1336 by Bonnie Ernst RN  Progress: no change  Taken 7/29/2022 1708 by Primitivo Benavidez RN  Plan of Care Reviewed With: patient  8/2/2022 1335 by Bonnie Ernst RN  Outcome: Ongoing, Progressing  Flowsheets  Taken 8/2/2022 1335 by Bonnie Ernst RN  Progress: no change  Taken 7/29/2022 1708 by Primitivo Benavidez RN  Plan of Care Reviewed With: patient  Goal: Patient-Specific Goal (Individualized)  8/2/2022 1336 by Bonnie Ernst RN  Outcome: Ongoing, Progressing  8/2/2022 1335 by Bonnie Ernst RN  Outcome: Ongoing, Progressing  Goal: Absence of Hospital-Acquired Illness or Injury  8/2/2022 1336 by Bonnie Ernst RN  Outcome: Ongoing, Progressing  8/2/2022 1335 by Bonnie Ernst RN  Outcome: Ongoing, Progressing  Intervention: Identify and Manage Fall Risk  Recent Flowsheet Documentation  Taken 8/2/2022 1245 by Bonnie Ernst RN  Safety Promotion/Fall Prevention: safety round/check completed  Taken 8/2/2022 1015 by Bonnie Ernst RN  Safety Promotion/Fall  Prevention: safety round/check completed  Taken 8/2/2022 0815 by Bonnie Ernst RN  Safety Promotion/Fall Prevention:   clutter free environment maintained   assistive device/personal items within reach   fall prevention program maintained   nonskid shoes/slippers when out of bed  Intervention: Prevent Skin Injury  Recent Flowsheet Documentation  Taken 8/2/2022 0815 by Bonnie Ernst RN  Skin Protection: adhesive use limited  Intervention: Prevent and Manage VTE (Venous Thromboembolism) Risk  Recent Flowsheet Documentation  Taken 8/2/2022 0815 by Bonnie Ernst RN  Activity Management: up ad alessandro  VTE Prevention/Management: dorsiflexion/plantar flexion performed  Range of Motion: active ROM (range of motion) encouraged  Intervention: Prevent Infection  Recent Flowsheet Documentation  Taken 8/2/2022 1245 by Bonnie Ernst RN  Infection Prevention: hand hygiene promoted  Taken 8/2/2022 1015 by Bonnie Ernst RN  Infection Prevention: hand hygiene promoted  Taken 8/2/2022 0815 by Bonnie Ernst RN  Infection Prevention: hand hygiene promoted  Goal: Optimal Comfort and Wellbeing  8/2/2022 1336 by Bonnie Ernst RN  Outcome: Ongoing, Progressing  8/2/2022 1335 by Bonnie Ernst RN  Outcome: Ongoing, Progressing  Intervention: Provide Person-Centered Care  Recent Flowsheet Documentation  Taken 8/2/2022 0815 by Bonnie Ernst RN  Trust Relationship/Rapport: care explained  Goal: Readiness for Transition of Care  8/2/2022 1336 by Bonnie Ernst RN  Outcome: Ongoing, Progressing  8/2/2022 1335 by Bonnie Ernst RN  Outcome: Ongoing, Progressing     Problem: Skin Injury Risk Increased  Goal: Skin Health and Integrity  8/2/2022 1336 by Bonnie Ernst RN  Outcome: Ongoing, Progressing  8/2/2022 1335 by Bonnie Ernst RN  Outcome: Ongoing, Progressing  Intervention: Optimize Skin Protection  Recent Flowsheet Documentation  Taken 8/2/2022 0815 by Bonnie Ernst RN  Pressure  Reduction Techniques: frequent weight shift encouraged  Pressure Reduction Devices: pressure-redistributing mattress utilized  Skin Protection: adhesive use limited   Goal Outcome Evaluation:           Progress: no change

## 2022-08-02 NOTE — PLAN OF CARE
Problem: Pain Acute  Goal: Acceptable Pain Control and Functional Ability  Outcome: Ongoing, Progressing  Intervention: Prevent or Manage Pain  Recent Flowsheet Documentation  Taken 8/2/2022 0815 by Bonnie Ernst RN  Sensory Stimulation Regulation: care clustered  Bowel Elimination Promotion: adequate fluid intake promoted  Sleep/Rest Enhancement: awakenings minimized  Medication Review/Management: medications reviewed  Intervention: Optimize Psychosocial Wellbeing  Recent Flowsheet Documentation  Taken 8/2/2022 0815 by Bonnie Ernst RN  Supportive Measures: active listening utilized  Diversional Activities:   television   smartphone     Problem: Adult Inpatient Plan of Care  Goal: Plan of Care Review  Outcome: Ongoing, Progressing  Flowsheets  Taken 8/2/2022 1335 by Bonnie Ernst RN  Progress: no change  Taken 7/29/2022 1708 by Primitivo Benavidez RN  Plan of Care Reviewed With: patient  Goal: Patient-Specific Goal (Individualized)  Outcome: Ongoing, Progressing  Goal: Absence of Hospital-Acquired Illness or Injury  Outcome: Ongoing, Progressing  Intervention: Identify and Manage Fall Risk  Recent Flowsheet Documentation  Taken 8/2/2022 1245 by Bonnie Ernst RN  Safety Promotion/Fall Prevention: safety round/check completed  Taken 8/2/2022 1015 by Bonnie Ernst RN  Safety Promotion/Fall Prevention: safety round/check completed  Taken 8/2/2022 0815 by Bonnie Ernst RN  Safety Promotion/Fall Prevention:   clutter free environment maintained   assistive device/personal items within reach   fall prevention program maintained   nonskid shoes/slippers when out of bed  Intervention: Prevent Skin Injury  Recent Flowsheet Documentation  Taken 8/2/2022 0815 by Bonnie Ernst RN  Skin Protection: adhesive use limited  Intervention: Prevent and Manage VTE (Venous Thromboembolism) Risk  Recent Flowsheet Documentation  Taken 8/2/2022 0815 by Bonnie Ernst RN  Activity Management: up ad  alessandro  VTE Prevention/Management: dorsiflexion/plantar flexion performed  Range of Motion: active ROM (range of motion) encouraged  Intervention: Prevent Infection  Recent Flowsheet Documentation  Taken 8/2/2022 1245 by Bonnie Ernst RN  Infection Prevention: hand hygiene promoted  Taken 8/2/2022 1015 by Bonnie Ernst RN  Infection Prevention: hand hygiene promoted  Taken 8/2/2022 0815 by Bonnie Ernst RN  Infection Prevention: hand hygiene promoted  Goal: Optimal Comfort and Wellbeing  Outcome: Ongoing, Progressing  Intervention: Provide Person-Centered Care  Recent Flowsheet Documentation  Taken 8/2/2022 0815 by Bonnie Ernst RN  Trust Relationship/Rapport: care explained  Goal: Readiness for Transition of Care  Outcome: Ongoing, Progressing     Problem: Skin Injury Risk Increased  Goal: Skin Health and Integrity  Outcome: Ongoing, Progressing  Intervention: Optimize Skin Protection  Recent Flowsheet Documentation  Taken 8/2/2022 0815 by Bonnie Ernst RN  Pressure Reduction Techniques: frequent weight shift encouraged  Pressure Reduction Devices: pressure-redistributing mattress utilized  Skin Protection: adhesive use limited   Goal Outcome Evaluation:           Progress: no change

## 2022-08-02 NOTE — CASE MANAGEMENT/SOCIAL WORK
Continued Stay Note  [facility]     Patient Name: Nieves Mc  MRN: [id number]  Today's Date: 8/2/2022    Admit Date: 7/27/2022     Discharge Plan     Row Name 08/02/22 1057       Plan    Plan Home with family. Will need 6 minute walk prior to d/c. Palliative following.    Plan Comments Patient will discharge home with family. Will need 6 minute walk before discharge, per pulm. Palliative following. D/C barriers: increased WBC, 2L NC, hem/onc, pulm, palliative following.              Phone communication or documentation only - no physical contact with patient or family.    Sweta Chawla RN

## 2022-08-02 NOTE — TELEPHONE ENCOUNTER
8/2/22-- PT  MOM WAS TOLD TO HAVE  HER DAUGHTER CALL US WHEN SHE GETS OUT OF HOSPITAL FOR DIRECTIONS-- TELL HER TID WHEN PT CALLS US BACK

## 2022-08-02 NOTE — PROGRESS NOTES
[facility] Medicine Services Daily Progress Note    Patient Name: Nieves Mc  : 1975  MRN: [id number]  Primary Care Physician:  Alida Latham APRN  Date of admission: 2022      Subjective      Chief Complaint: Recurrent shortness of breath        Patient Reports that she is feeling okay.  2 L again.  Seems very labored after walking. Saturating 89 to 90%.  Still tachycardic.  Pulmonary consulted.    Patient seems to have an unresolving pneumonitis which is somewhat worse.  IV steroids initiated.  I do not believe she is ready to be discharged this way.  Patient was severely out of breath after doing her walk test which she supposedly passed.  Perhaps she needs more IV steroids before her tachycardia and dyspnea will improve.  Last admission and took several days of steroids before she settled down.     ROS negative except as above       Objective      Vitals:   Temp:  [97.6 °F (36.4 °C)-98 °F (36.7 °C)] 97.6 °F (36.4 °C)  Heart Rate:  [107-120] 110  Resp:  [18-19] 18  BP: (113-141)/(70-86) 128/85  Flow (L/min):  [2] 2    Physical Exam  Vitals reviewed.   Constitutional:       Comments: Labored  Wearing nasal cannula   HENT:      Head: Normocephalic.      Nose: Nose normal.      Mouth/Throat:      Mouth: Mucous membranes are moist.   Cardiovascular:      Rate and Rhythm: Regular rhythm. Tachycardia present.      Pulses: Normal pulses.      Heart sounds: Normal heart sounds.   Pulmonary:      Effort: Pulmonary effort is normal.      Breath sounds: Rhonchi present.   Abdominal:      General: Abdomen is flat.      Palpations: Abdomen is soft.   Musculoskeletal:         General: Normal range of motion.      Cervical back: Normal range of motion.   Skin:     General: Skin is warm.   Neurological:      General: No focal deficit present.      Mental Status: She is alert and oriented to person, place, and time.   Psychiatric:         Mood and Affect: Mood normal.          Behavior: Behavior normal.             Result Review    Result Review:  I have personally reviewed the results from the time of this admission to 8/2/2022 14:42 EDT and agree with these findings:  [x]  Laboratory  []  Microbiology  []  Radiology  []  EKG/Telemetry   []  Cardiology/Vascular   []  Pathology  []  Old records  []  Other:  Most notable findings include: Severe leukocytosis due to leukemia           Assessment & Plan       Brief Patient Summary:  Nieves Mc is a 46 y.o. female who presents with recurrent pneumonitis dyspnea        allopurinol, 300 mg, Oral, Daily  cefepime, 2 g, Intravenous, Q8H  citalopram, 10 mg, Oral, Daily  folic acid, 1 mg, Oral, Daily  gabapentin, 300 mg, Oral, TID  guaiFENesin, 600 mg, Oral, Q12H  hydroxyurea, 1,000 mg, Oral, BID  insulin glargine, 20 Units, Subcutaneous, Nightly  insulin lispro, 0-14 Units, Subcutaneous, TID AC  insulin lispro, 5 Units, Subcutaneous, TID With Meals  insulin regular, 8 Units, Subcutaneous, Daily With Breakfast  losartan, 25 mg, Oral, Q24H  metoprolol succinate XL, 25 mg, Oral, Q24H  mirtazapine, 15 mg, Oral, Nightly  predniSONE, 40 mg, Oral, Daily With Breakfast  rivaroxaban, 20 mg, Oral, Daily With Dinner  sodium chloride, 10 mL, Intravenous, Q12H  traZODone, 50 mg, Oral, Nightly               Active Hospital Problems:          Active Hospital Problems     Diagnosis     • **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)     • Dyspnea, unspecified type     • Chronic pain     • Narcotic dependence ([facility])     • Acute respiratory failure with hypoxia ([facility])     • Acute diastolic CHF (congestive heart failure) ([facility])     • NICM (nonischemic cardiomyopathy) ([facility])     • Type 2 diabetes mellitus with diabetic polyneuropathy, with long-term current use of insulin ([facility])     • History of pulmonary embolism     • Depression with anxiety        Plan:       1.  CML blast crisis -consulted hematology oncology, giving fluids, steroids, allopurinol, and  continued hydroxyurea.  2.  Acute respiratory failure with hypoxia differential includes the blast crisis, pneumonia, and congestive heart failure. CT scan looks the same as it has on her previous hospitalization when she had blast crisis and cultures were negative at that time.  The groundglass opacities could very well reflect the blast crisis and the inflammatory response for all these white blood cells we will continue to treat with cefepime for now just cover of the bases.  Congestive heart failure could be possible but for now I am more concerned about the blast crisis if necessary we will give Lasix.  Pulmonary consulted.  The patient seems to have severe case of recurrent pneumonitis  3.  Acute on chronic combined congestive heart failure -patient may be in congestive heart failure but concerned about the blast crisis did not want to dehydrate the patient with all those white blood cells in the system and have an increased risk of tumor lysis syndrome if hematology stops the crisis.  However her shortness of breath worsens we will give Lasix  4.  History of pulmonary embolus and continue rivaroxaban  5.  Type 2 diabetes on insulin therapy placed her on prandial long-acting and correction  6.  Chronic pain patient was having a lot of pain I believe this was contributing to her tachycardia have oxycodone available she is on narcotics for a long time and it openly admits that she is dependent  7.  Anxiety and benzodiazepine dependent continued alprazolam as needed and added Remeron at night and continued her Celexa     Oncology on board, will follow recommendation.          DVT prophylaxis:  Medical DVT prophylaxis orders are present.     CODE STATUS:    Level Of Support Discussed With: Patient  Code Status (Patient has no pulse and is not breathing): CPR (Attempt to Resuscitate)  Medical Interventions (Patient has pulse or is breathing): Full Support           This patient has been examined wearing appropriate  Personal Protective Equipment and discussed with patient and nursing   08/02/22      Electronically signed by Villa Patel MD, 08/02/22, 14:42 EDT.  [facility] Hospitalist Team

## 2022-08-02 NOTE — TELEPHONE ENCOUNTER
Per INSPECT she filled 90 tabs on 7/27 at the [facility] pharmacy.  Then somehow she managed to fill 30 more an [place].  As she is currently admitted to the hospital I am very curious as to why she is needing outpatient meds and why her mom is handling them.

## 2022-08-02 NOTE — PROGRESS NOTES
Hematology/Oncology Inpatient Progress Note    PATIENT NAME: Nieves Mc  : 1975  MRN: [id number]    CHIEF COMPLAINT: Shortness of breath    HISTORY OF PRESENT ILLNESS:      Nieves Mc is a 46 y.o. female who presented to [facility] on 2022 with complaints of SOA. Labs showed , Hgb 8.5, Platelets 306, Blasts 26%. Uric Acid 9.0, Alk Phos 875. CT scan of the chest showing extensive groundglass attenuation throughout the lungs is likely related to an inflammatory or atypical infectious process. A component of edema should also be a consideration. Covid pneumonia would be in differential diagnosis as well. Patient was started on antibiotics in the ER.        22  Hematology/Oncology was consulted for established patient with CML presenting with 26% blasts on CBC.  Patient is currently on Hydrea with history of noncompliance with medications. Recent hospitalization 2022 to 2022 for same. At that time hydrea was continued. Last TKI was Tasigna and it was discontinued due to concerns that heart disease was a complication of treatment.   · 2022 echocardiogram done showing EF 41-45%  · 2022 discharge from [facility] on hydroxyurea 500 mg daily  · 2022 proBNP 13,362  · 2022 LDH is high at 1974  · 2022 pRBC transfused for Hgb 6.7     Patient with history of CML, noncompliant with medicines including TKI's.  Patient with multiple hospitalizations.  Was in the hospital discharged on 2022 on hydroxyurea 500 mg daily.     Patient is admitted to the hospital with dyspnea.  CT of the chest shows groundglass opacifications which was secondary to inflammatory or atypical infectious process     She  has a past medical history of Bone pain, COVID-19 virus infection (2022), Diabetes mellitus (HCC), Extremity pain, Leg pain, Migraine, Pulmonary embolism (HCC), and Vision loss.     PCP: Alida Latham APRN    History of present illness was reviewed  and is unchanged from the previous visit. 07/31/22    Subjective      She has no new complaints today    ROS:    Review of Systems   Constitutional: Positive for fatigue. Negative for chills and fever.   HENT: Negative for congestion, drooling, ear discharge, rhinorrhea, sinus pressure and tinnitus.    Eyes: Negative for photophobia, pain and discharge.   Respiratory: Negative for apnea, choking and stridor.    Cardiovascular: Negative for palpitations.   Gastrointestinal: Negative for abdominal distention, abdominal pain and anal bleeding.   Endocrine: Negative for polydipsia and polyphagia.   Genitourinary: Negative for decreased urine volume, flank pain and genital sores.   Musculoskeletal: Negative for gait problem, neck pain and neck stiffness.   Skin: Negative for color change, rash and wound.   Neurological: Positive for weakness. Negative for tremors, seizures, syncope, facial asymmetry and speech difficulty.   Hematological: Negative for adenopathy.   Psychiatric/Behavioral: Negative for agitation, confusion, hallucinations and self-injury. The patient is not hyperactive.         MEDICATIONS:    Scheduled Meds:  allopurinol, 300 mg, Oral, Daily  cefepime, 2 g, Intravenous, Q8H  citalopram, 10 mg, Oral, Daily  folic acid, 1 mg, Oral, Daily  gabapentin, 300 mg, Oral, TID  guaiFENesin, 600 mg, Oral, Q12H  hydroxyurea, 1,000 mg, Oral, BID  insulin glargine, 25 Units, Subcutaneous, Nightly  insulin lispro, 0-24 Units, Subcutaneous, 4x Daily With Meals & Nightly  insulin lispro, 5 Units, Subcutaneous, TID With Meals  insulin regular, 16 Units, Subcutaneous, Q6H  losartan, 25 mg, Oral, Q24H  methylPREDNISolone sodium succinate, 40 mg, Intravenous, Q6H  metoprolol succinate XL, 25 mg, Oral, Q24H  mirtazapine, 15 mg, Oral, Nightly  rivaroxaban, 20 mg, Oral, Daily With Dinner  sodium chloride, 10 mL, Intravenous, Q12H  traZODone, 50 mg, Oral, Nightly       Continuous Infusions:      PRN Meds:  •  acetaminophen **OR**  "acetaminophen **OR** acetaminophen  •  ALPRAZolam  •  aluminum-magnesium hydroxide-simethicone  •  dextrose  •  dextrose  •  glucagon (human recombinant)  •  hydrALAZINE  •  HYDROcodone-acetaminophen  •  ipratropium-albuterol  •  melatonin  •  nitroglycerin  •  ondansetron **OR** ondansetron  •  oxyCODONE  •  [COMPLETED] Insert peripheral IV **AND** sodium chloride  •  sodium chloride     ALLERGIES:    Allergies   Allergen Reactions   • Hydromorphone GI Intolerance     dilaudid   • Morphine Nausea And Vomiting   • Propofol Hives     Previously tolerated being premedicated with diphenhydramine and famotadine       Objective    VITALS:   /73   Pulse 106   Temp 98.2 °F (36.8 °C) (Oral)   Resp 18   Ht 162.6 cm (64\")   Wt 61.6 kg (135 lb 12.9 oz)   SpO2 96%   BMI 23.31 kg/m²     PHYSICAL EXAM:     Physical Exam  Vitals and nursing note reviewed.   Constitutional:       General: She is not in acute distress.     Appearance: She is not diaphoretic.   HENT:      Head: Normocephalic and atraumatic.   Eyes:      General: No scleral icterus.        Right eye: No discharge.         Left eye: No discharge.      Conjunctiva/sclera: Conjunctivae normal.   Neck:      Thyroid: No thyromegaly.   Cardiovascular:      Rate and Rhythm: Normal rate and regular rhythm.      Heart sounds: Normal heart sounds.     No friction rub. No gallop.   Pulmonary:      Effort: Pulmonary effort is normal. No respiratory distress.      Breath sounds: No stridor. No wheezing.   Abdominal:      General: Bowel sounds are normal.      Palpations: Abdomen is soft. There is no mass.      Tenderness: There is no abdominal tenderness. There is no guarding or rebound.   Musculoskeletal:         General: No tenderness. Normal range of motion.      Cervical back: Normal range of motion and neck supple.   Lymphadenopathy:      Cervical: No cervical adenopathy.   Skin:     General: Skin is warm.      Findings: Bruising present. No erythema or rash.     "  Comments: On her back   Neurological:      Mental Status: She is alert and oriented to person, place, and time.      Motor: No abnormal muscle tone.   Psychiatric:         Behavior: Behavior normal.       I have reexamined the patient and the results are consistent with the previously documented exam. Meghann Lerma MD     RECENT LABS:    Lab Results (last 24 hours)     Procedure Component Value Units Date/Time    POC Glucose Once [[id number]]  (Abnormal) Collected: 08/03/22 1109    Specimen: Blood Updated: 08/03/22 1110     Glucose 422 mg/dL      Comment: Serial Number: [id number]:  [id number]       Uric Acid [[id number]]  (Normal) Collected: 08/03/22 0426    Specimen: Blood Updated: 08/03/22 1023     Uric Acid 4.6 mg/dL     POC Glucose Once [[id number]]  (Abnormal) Collected: 08/03/22 0710    Specimen: Blood Updated: 08/03/22 0711     Glucose 457 mg/dL      Comment: Serial Number: [id number]:  [id number]       Manual Differential [[id number]]  (Abnormal) Collected: 08/03/22 0426    Specimen: Blood Updated: 08/03/22 0653     Neutrophil % 43.0 %      Lymphocyte % 11.0 %      Monocyte % 4.0 %      Eosinophil % 3.0 %      Basophil % 8.0 %      Bands %  5.0 %      Metamyelocyte % 6.0 %      Promyelocyte % 3.0 %      Blasts % 17.0 %      Neutrophils Absolute 53.81 10*3/mm3      Lymphocytes Absolute 12.33 10*3/mm3      Monocytes Absolute 4.48 10*3/mm3      Eosinophils Absolute 3.36 10*3/mm3      Basophils Absolute 8.97 10*3/mm3      Anisocytosis Slight/1+     Poikilocytes Slight/1+     WBC Morphology Normal     Large Platelets Slight/1+    Narrative:      Reviewed by Pathologist within the past 30 days on [id number] .      CBC & Differential [[id number]]  (Abnormal) Collected: 08/03/22 0426    Specimen: Blood Updated: 08/03/22 0653    Narrative:      The following orders were created for panel order CBC & Differential.  Procedure                               Abnormality         Status                      ---------                               -----------         ------                     CBC Auto Differential[[id number]]        Abnormal            Final result               Scan Slide[[id number]]                                       Final result                 Please view results for these tests on the individual orders.    Scan Slide [[id number]] Collected: 08/03/22 0426    Specimen: Blood Updated: 08/03/22 0653     Scan Slide --     Comment: See Manual Differential Results       CBC Auto Differential [[id number]]  (Abnormal) Collected: 08/03/22 0426    Specimen: Blood Updated: 08/03/22 0653     .10 10*3/mm3      RBC 3.44 10*6/mm3      Hemoglobin 8.7 g/dL      Hematocrit 29.2 %      MCV 84.8 fL      MCH 25.2 pg      MCHC 29.7 g/dL      RDW 21.4 %      RDW-SD 62.1 fl      MPV 7.4 fL      Platelets 95 10*3/mm3     Narrative:      The previously reported component NRBC is no longer being reported. Previous result was 0.6 /100 WBC (Reference Range: 0.0-0.2 /100 WBC) on 8/3/2022 at 0514 EDT.    Basic Metabolic Panel [[id number]]  (Abnormal) Collected: 08/03/22 0426    Specimen: Blood Updated: 08/03/22 0545     Glucose 424 mg/dL      BUN 27 mg/dL      Creatinine 0.57 mg/dL      Sodium 133 mmol/L      Potassium 5.7 mmol/L      Chloride 99 mmol/L      CO2 24.0 mmol/L      Calcium 9.2 mg/dL      BUN/Creatinine Ratio 47.4     Anion Gap 10.0 mmol/L      eGFR 113.7 mL/min/1.73      Comment: National Kidney Foundation and American Society of Nephrology (ASN) Task Force recommended calculation based on the Chronic Kidney Disease Epidemiology Collaboration (CKD-EPI) equation refit without adjustment for race.       Narrative:      GFR Normal >60  Chronic Kidney Disease <60  Kidney Failure <15      Phosphorus [[id number]]  (Normal) Collected: 08/03/22 0426    Specimen: Blood Updated: 08/03/22 0543     Phosphorus 4.4 mg/dL     Magnesium [[id number]]  (Normal) Collected: 08/03/22 0426    Specimen: Blood Updated: 08/03/22 0543      Magnesium 2.2 mg/dL     POC Glucose Once [[id number]]  (Abnormal) Collected: 08/02/22 2004    Specimen: Blood Updated: 08/02/22 2006     Glucose 485 mg/dL      Comment: Serial Number: [id number]:  [id number]       POC Glucose Once [[id number]]  (Abnormal) Collected: 08/02/22 1629    Specimen: Blood Updated: 08/02/22 1631     Glucose 493 mg/dL      Comment: Serial Number: [id number]:  [id number]             PENDING RESULTS:     IMAGING REVIEWED:  No radiology results for the last day    Assessment & Plan      ASSESSMENT:    1. CML with hyperleukocytosis :  Has been initiated on IVF, steroids, allopurinol and hydroxyurea has been continued. Hx. of non-compliance with treatment.  Her white count is beginning to drop on hydroxyurea.  WBC decreased to 115.7 today from 135.6 yesterday . Continue to monitor CBC daily. Her leukocytosis is stable.  Blast count is down to 14%.  She has no evidence of tumor lysis syndrome.  Overall her counts continue to decrease with hydroxyurea.  Watch platelets very closely    2. Significant anemia secondary to malignancy, hydroxyurea.  Patient receiving PRBC's as needed. Hgb 8.1 stable.    3. History of PE: on rivaroxaban.  She will continue the same    4. Acute Resp Failure/Acute on Chronic CHF/Type 2 DM-managed per primary team    5. Anxiety/Depression    6. Chronic Pain     PLANS:    1. Continue hydroxyurea to 1 gm BID. Continue to look for TKI options for her given her underlying cardiac disease.   2. Consult Palliative Care to discuss goals of care. Patient is still interested in pursuing treatment.  3. Transfuse PRBC's as needed  4. Daily CBCs  5. Continue supportive care  6. Discussed with patient         Electronically signed by Meghann Lerma MD, 08/03/22, 12:43 PM EDT.

## 2022-08-02 NOTE — TELEPHONE ENCOUNTER
8/2/22 dR Monahan-- CALLED PTS MOTHER-- SHE STATED SHE PICKED UP THE [facility]  RX AND HER  MUST OF PICKED UP THE HOSPITAL RX-- MOTHER WANTS TO KNOW HOW HER DAUGHTER IS TO TAKE THE MEDICATION ONCE SHE IS OUT OF THE HOSPITAL-- ONE RX SAYS ONE A DAY  AND THE OTHER ONE SAYS  3 TIMES A DAY-- PLEASE ADVISE

## 2022-08-02 NOTE — PROGRESS NOTES
Exercise Oximetry    Patient Name:Nieves Mc   MRN: [id number]   Date: 08/02/22             ROOM AIR BASELINE   SpO2% 98   Heart Rate    Blood Pressure      EXERCISE ON ROOM AIR SpO2% EXERCISE ON O2 @  LPM SpO2%   1 MINUTE 96 1 MINUTE    2 MINUTES 94 2 MINUTES    3 MINUTES 96 3 MINUTES    4 MINUTES 95 4 MINUTES    5 MINUTES 95 5 MINUTES    6 MINUTES 96 6 MINUTES               Distance Walked   Distance Walked   Dyspnea ([place] Scale)   Dyspnea ([place] Scale)   Fatigue ([place] Scale)   Fatigue ([place] Scale)   SpO2% Post Exercise   SpO2% Post Exercise   HR Post Exercise   HR Post Exercise   Time to Recovery   Time to Recovery     Comments: Room air sats 94-96% for duration of walk

## 2022-08-03 LAB
ANION GAP SERPL CALCULATED.3IONS-SCNC: 10 MMOL/L (ref 5–15)
ANISOCYTOSIS BLD QL: ABNORMAL
BASOPHILS # BLD MANUAL: 8.97 10*3/MM3 (ref 0–0.2)
BASOPHILS NFR BLD MANUAL: 8 % (ref 0–1.5)
BH BB BLOOD EXPIRATION DATE: NORMAL
BH BB BLOOD EXPIRATION DATE: NORMAL
BH BB BLOOD TYPE BARCODE: 6200
BH BB BLOOD TYPE BARCODE: 6200
BH BB DISPENSE STATUS: NORMAL
BH BB DISPENSE STATUS: NORMAL
BH BB PRODUCT CODE: NORMAL
BH BB PRODUCT CODE: NORMAL
BH BB UNIT NUMBER: NORMAL
BH BB UNIT NUMBER: NORMAL
BLASTS NFR BLD MANUAL: 17 % (ref 0–0)
BUN SERPL-MCNC: 27 MG/DL (ref 6–20)
BUN/CREAT SERPL: 47.4 (ref 7–25)
CALCIUM SPEC-SCNC: 9.2 MG/DL (ref 8.6–10.5)
CHLORIDE SERPL-SCNC: 99 MMOL/L (ref 98–107)
CO2 SERPL-SCNC: 24 MMOL/L (ref 22–29)
CREAT SERPL-MCNC: 0.57 MG/DL (ref 0.57–1)
CROSSMATCH INTERPRETATION: NORMAL
CROSSMATCH INTERPRETATION: NORMAL
DEPRECATED RDW RBC AUTO: 62.1 FL (ref 37–54)
EGFRCR SERPLBLD CKD-EPI 2021: 113.7 ML/MIN/1.73
EOSINOPHIL # BLD MANUAL: 3.36 10*3/MM3 (ref 0–0.4)
EOSINOPHIL NFR BLD MANUAL: 3 % (ref 0.3–6.2)
ERYTHROCYTE [DISTWIDTH] IN BLOOD BY AUTOMATED COUNT: 21.4 % (ref 12.3–15.4)
GLUCOSE BLDC GLUCOMTR-MCNC: 310 MG/DL (ref 70–105)
GLUCOSE BLDC GLUCOMTR-MCNC: 415 MG/DL (ref 70–105)
GLUCOSE BLDC GLUCOMTR-MCNC: 422 MG/DL (ref 70–105)
GLUCOSE BLDC GLUCOMTR-MCNC: 457 MG/DL (ref 70–105)
GLUCOSE SERPL-MCNC: 424 MG/DL (ref 65–99)
HCT VFR BLD AUTO: 29.2 % (ref 34–46.6)
HGB BLD-MCNC: 8.7 G/DL (ref 12–15.9)
LARGE PLATELETS: ABNORMAL
LYMPHOCYTES # BLD MANUAL: 12.33 10*3/MM3 (ref 0.7–3.1)
LYMPHOCYTES NFR BLD MANUAL: 4 % (ref 5–12)
MAGNESIUM SERPL-MCNC: 2.2 MG/DL (ref 1.6–2.6)
MCH RBC QN AUTO: 25.2 PG (ref 26.6–33)
MCHC RBC AUTO-ENTMCNC: 29.7 G/DL (ref 31.5–35.7)
MCV RBC AUTO: 84.8 FL (ref 79–97)
METAMYELOCYTES NFR BLD MANUAL: 6 % (ref 0–0)
MONOCYTES # BLD: 4.48 10*3/MM3 (ref 0.1–0.9)
NEUTROPHILS # BLD AUTO: 53.81 10*3/MM3 (ref 1.7–7)
NEUTROPHILS NFR BLD MANUAL: 43 % (ref 42.7–76)
NEUTS BAND NFR BLD MANUAL: 5 % (ref 0–5)
PHOSPHATE SERPL-MCNC: 4.4 MG/DL (ref 2.5–4.5)
PLATELET # BLD AUTO: 95 10*3/MM3 (ref 140–450)
PMV BLD AUTO: 7.4 FL (ref 6–12)
POIKILOCYTOSIS BLD QL SMEAR: ABNORMAL
POTASSIUM SERPL-SCNC: 4.4 MMOL/L (ref 3.5–5.2)
POTASSIUM SERPL-SCNC: 5.7 MMOL/L (ref 3.5–5.2)
PROMYELOCYTES NFR BLD MANUAL: 3 % (ref 0–0)
RBC # BLD AUTO: 3.44 10*6/MM3 (ref 3.77–5.28)
SCAN SLIDE: NORMAL
SODIUM SERPL-SCNC: 133 MMOL/L (ref 136–145)
UNIT  ABO: NORMAL
UNIT  ABO: NORMAL
UNIT  RH: NORMAL
UNIT  RH: NORMAL
URATE SERPL-MCNC: 4.6 MG/DL (ref 2.4–5.7)
VARIANT LYMPHS NFR BLD MANUAL: 11 % (ref 19.6–45.3)
WBC MORPH BLD: NORMAL
WBC NRBC COR # BLD: 112.1 10*3/MM3 (ref 3.4–10.8)

## 2022-08-03 PROCEDURE — 85025 COMPLETE CBC W/AUTO DIFF WBC: CPT | Performed by: NURSE PRACTITIONER

## 2022-08-03 PROCEDURE — 63710000001 INSULIN LISPRO (HUMAN) PER 5 UNITS: Performed by: INTERNAL MEDICINE

## 2022-08-03 PROCEDURE — 84100 ASSAY OF PHOSPHORUS: CPT | Performed by: INTERNAL MEDICINE

## 2022-08-03 PROCEDURE — 63710000001 INSULIN GLARGINE PER 5 UNITS: Performed by: INTERNAL MEDICINE

## 2022-08-03 PROCEDURE — 85007 BL SMEAR W/DIFF WBC COUNT: CPT | Performed by: NURSE PRACTITIONER

## 2022-08-03 PROCEDURE — 25010000002 METHYLPREDNISOLONE PER 40 MG: Performed by: NURSE PRACTITIONER

## 2022-08-03 PROCEDURE — 25010000002 ONDANSETRON PER 1 MG: Performed by: INTERNAL MEDICINE

## 2022-08-03 PROCEDURE — 63710000001 INSULIN REGULAR HUMAN PER 5 UNITS: Performed by: INTERNAL MEDICINE

## 2022-08-03 PROCEDURE — 99232 SBSQ HOSP IP/OBS MODERATE 35: CPT | Performed by: INTERNAL MEDICINE

## 2022-08-03 PROCEDURE — 84550 ASSAY OF BLOOD/URIC ACID: CPT | Performed by: INTERNAL MEDICINE

## 2022-08-03 PROCEDURE — 25010000002 CEFEPIME PER 500 MG: Performed by: INTERNAL MEDICINE

## 2022-08-03 PROCEDURE — 84132 ASSAY OF SERUM POTASSIUM: CPT | Performed by: INTERNAL MEDICINE

## 2022-08-03 PROCEDURE — 82962 GLUCOSE BLOOD TEST: CPT

## 2022-08-03 PROCEDURE — 83735 ASSAY OF MAGNESIUM: CPT | Performed by: INTERNAL MEDICINE

## 2022-08-03 PROCEDURE — 99222 1ST HOSP IP/OBS MODERATE 55: CPT | Performed by: INTERNAL MEDICINE

## 2022-08-03 PROCEDURE — 80048 BASIC METABOLIC PNL TOTAL CA: CPT | Performed by: INTERNAL MEDICINE

## 2022-08-03 RX ADMIN — LOSARTAN POTASSIUM 25 MG: 25 TABLET, FILM COATED ORAL at 10:55

## 2022-08-03 RX ADMIN — CEFEPIME HYDROCHLORIDE 2 G: 2 INJECTION, POWDER, FOR SOLUTION INTRAVENOUS at 01:25

## 2022-08-03 RX ADMIN — GABAPENTIN 300 MG: 300 CAPSULE ORAL at 21:45

## 2022-08-03 RX ADMIN — INSULIN LISPRO 24 UNITS: 100 INJECTION, SOLUTION INTRAVENOUS; SUBCUTANEOUS at 21:48

## 2022-08-03 RX ADMIN — SODIUM ZIRCONIUM CYCLOSILICATE 10 G: 10 POWDER, FOR SUSPENSION ORAL at 12:23

## 2022-08-03 RX ADMIN — MIRTAZAPINE 15 MG: 15 TABLET, FILM COATED ORAL at 21:46

## 2022-08-03 RX ADMIN — GUAIFENESIN 600 MG: 600 TABLET, EXTENDED RELEASE ORAL at 10:54

## 2022-08-03 RX ADMIN — GABAPENTIN 300 MG: 300 CAPSULE ORAL at 15:11

## 2022-08-03 RX ADMIN — INSULIN LISPRO 5 UNITS: 100 INJECTION, SOLUTION INTRAVENOUS; SUBCUTANEOUS at 19:41

## 2022-08-03 RX ADMIN — INSULIN HUMAN 10 UNITS: 100 INJECTION, SOLUTION PARENTERAL at 02:11

## 2022-08-03 RX ADMIN — TRAZODONE HYDROCHLORIDE 50 MG: 50 TABLET ORAL at 21:46

## 2022-08-03 RX ADMIN — ALLOPURINOL 300 MG: 300 TABLET ORAL at 10:54

## 2022-08-03 RX ADMIN — OXYCODONE 10 MG: 5 TABLET ORAL at 21:57

## 2022-08-03 RX ADMIN — METHYLPREDNISOLONE SODIUM SUCCINATE 40 MG: 40 INJECTION, POWDER, FOR SOLUTION INTRAMUSCULAR; INTRAVENOUS at 21:45

## 2022-08-03 RX ADMIN — INSULIN HUMAN 16 UNITS: 100 INJECTION, SOLUTION PARENTERAL at 21:47

## 2022-08-03 RX ADMIN — PANTOPRAZOLE SODIUM 40 MG: 40 TABLET, DELAYED RELEASE ORAL at 07:54

## 2022-08-03 RX ADMIN — GABAPENTIN 300 MG: 300 CAPSULE ORAL at 10:54

## 2022-08-03 RX ADMIN — INSULIN LISPRO 16 UNITS: 100 INJECTION, SOLUTION INTRAVENOUS; SUBCUTANEOUS at 19:40

## 2022-08-03 RX ADMIN — Medication 10 ML: at 21:46

## 2022-08-03 RX ADMIN — CEFEPIME HYDROCHLORIDE 2 G: 2 INJECTION, POWDER, FOR SOLUTION INTRAVENOUS at 19:41

## 2022-08-03 RX ADMIN — INSULIN HUMAN 10 UNITS: 100 INJECTION, SOLUTION PARENTERAL at 07:55

## 2022-08-03 RX ADMIN — INSULIN LISPRO 24 UNITS: 100 INJECTION, SOLUTION INTRAVENOUS; SUBCUTANEOUS at 15:11

## 2022-08-03 RX ADMIN — INSULIN HUMAN 16 UNITS: 100 INJECTION, SOLUTION PARENTERAL at 12:23

## 2022-08-03 RX ADMIN — METHYLPREDNISOLONE SODIUM SUCCINATE 40 MG: 40 INJECTION, POWDER, FOR SOLUTION INTRAMUSCULAR; INTRAVENOUS at 15:10

## 2022-08-03 RX ADMIN — METHYLPREDNISOLONE SODIUM SUCCINATE 40 MG: 40 INJECTION, POWDER, FOR SOLUTION INTRAMUSCULAR; INTRAVENOUS at 07:55

## 2022-08-03 RX ADMIN — FOLIC ACID 1 MG: 1 TABLET ORAL at 10:54

## 2022-08-03 RX ADMIN — ONDANSETRON 4 MG: 2 INJECTION INTRAMUSCULAR; INTRAVENOUS at 02:11

## 2022-08-03 RX ADMIN — INSULIN GLARGINE 25 UNITS: 100 INJECTION, SOLUTION SUBCUTANEOUS at 21:49

## 2022-08-03 RX ADMIN — CITALOPRAM HYDROBROMIDE 10 MG: 20 TABLET ORAL at 10:54

## 2022-08-03 RX ADMIN — OXYCODONE 10 MG: 5 TABLET ORAL at 07:56

## 2022-08-03 RX ADMIN — HYDROXYUREA 1000 MG: 500 CAPSULE ORAL at 10:55

## 2022-08-03 RX ADMIN — GUAIFENESIN 600 MG: 600 TABLET, EXTENDED RELEASE ORAL at 21:45

## 2022-08-03 RX ADMIN — RIVAROXABAN 20 MG: 20 TABLET, FILM COATED ORAL at 21:45

## 2022-08-03 RX ADMIN — CEFEPIME HYDROCHLORIDE 2 G: 2 INJECTION, POWDER, FOR SOLUTION INTRAVENOUS at 10:54

## 2022-08-03 RX ADMIN — METOPROLOL SUCCINATE 25 MG: 25 TABLET, EXTENDED RELEASE ORAL at 10:55

## 2022-08-03 RX ADMIN — INSULIN HUMAN 16 UNITS: 100 INJECTION, SOLUTION PARENTERAL at 15:11

## 2022-08-03 RX ADMIN — INSULIN LISPRO 5 UNITS: 100 INJECTION, SOLUTION INTRAVENOUS; SUBCUTANEOUS at 15:11

## 2022-08-03 RX ADMIN — HYDROXYUREA 1000 MG: 500 CAPSULE ORAL at 21:46

## 2022-08-03 RX ADMIN — Medication 10 ML: at 10:56

## 2022-08-03 RX ADMIN — METHYLPREDNISOLONE SODIUM SUCCINATE 40 MG: 40 INJECTION, POWDER, FOR SOLUTION INTRAMUSCULAR; INTRAVENOUS at 02:11

## 2022-08-03 NOTE — PROGRESS NOTES
[facility] Medicine Services Daily Progress Note    Patient Name: Nieves Mc  : 1975  MRN: [id number]  Primary Care Physician:  Alida Latham APRN  Date of admission: 2022      Subjective         Chief Complaint: Recurrent shortness of breath/pneumonitis questionable pneumonia/        Patient Reports that she is feeling okay.  2 L again.  Seems very labored after walking. Saturating 89 to 90%.  Still tachycardic.  She never qualifies for oxygen based on her sleep study or walk test but ends up on 2 L of oxygen every day.  Unfortunately very difficult to discharge  the situation. She will not do well without oxygen at home but does not qualify due to borderline parameters.     ROS negative except as above    Objective      Vitals:   Temp:  [97.3 °F (36.3 °C)-98.3 °F (36.8 °C)] 98 °F (36.7 °C)  Heart Rate:  [101-117] 111  Resp:  [18-20] 20  BP: (110-141)/(68-89) 140/89  Flow (L/min):  [2] 2    Physical Exam  Vitals reviewed.   Constitutional:       Comments: Labored  Wearing nasal cannula   HENT:      Head: Normocephalic.      Nose: Nose normal.      Mouth/Throat:      Mouth: Mucous membranes are moist.   Cardiovascular:      Rate and Rhythm: Regular rhythm. Tachycardia present.      Pulses: Normal pulses.      Heart sounds: Normal heart sounds.   Pulmonary:      Effort: Pulmonary effort is normal.      Breath sounds: Rhonchi present.   Abdominal:      General: Abdomen is flat.      Palpations: Abdomen is soft.   Musculoskeletal:         General: Normal range of motion.      Cervical back: Normal range of motion.   Skin:     General: Skin is warm.   Neurological:      General: No focal deficit present.      Mental Status: She is alert and oriented to person, place, and time.   Psychiatric:         Mood and Affect: Mood normal.         Behavior: Behavior normal.     Result Review    Result Review:  I have personally reviewed the results from the time of this admission to  8/3/2022 17:07 EDT and agree with these findings:  [x]  Laboratory  []  Microbiology  []  Radiology  []  EKG/Telemetry   []  Cardiology/Vascular   []  Pathology  []  Old records  []  Other:  Most notable findings include: Elevated WBC                Assessment & Plan       Brief Patient Summary:  Nieves Mc is a 46 y.o. female who presents with recurrent pneumonitis dyspnea        allopurinol, 300 mg, Oral, Daily  cefepime, 2 g, Intravenous, Q8H  citalopram, 10 mg, Oral, Daily  folic acid, 1 mg, Oral, Daily  gabapentin, 300 mg, Oral, TID  guaiFENesin, 600 mg, Oral, Q12H  hydroxyurea, 1,000 mg, Oral, BID  insulin glargine, 20 Units, Subcutaneous, Nightly  insulin lispro, 0-14 Units, Subcutaneous, TID AC  insulin lispro, 5 Units, Subcutaneous, TID With Meals  insulin regular, 8 Units, Subcutaneous, Daily With Breakfast  losartan, 25 mg, Oral, Q24H  metoprolol succinate XL, 25 mg, Oral, Q24H  mirtazapine, 15 mg, Oral, Nightly  predniSONE, 40 mg, Oral, Daily With Breakfast  rivaroxaban, 20 mg, Oral, Daily With Dinner  sodium chloride, 10 mL, Intravenous, Q12H  traZODone, 50 mg, Oral, Nightly               Active Hospital Problems:          Active Hospital Problems     Diagnosis     • **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)     • Dyspnea, unspecified type     • Chronic pain     • Narcotic dependence ([facility])     • Acute respiratory failure with hypoxia ([facility])     • Acute diastolic CHF (congestive heart failure) ([facility])     • NICM (nonischemic cardiomyopathy) ([facility])     • Type 2 diabetes mellitus with diabetic polyneuropathy, with long-term current use of insulin ([facility])     • History of pulmonary embolism     • Depression with anxiety        Plan:       1.  CML blast crisis -consulted hematology oncology, giving fluids, steroids, allopurinol, and continued hydroxyurea.  2.  Acute respiratory failure with hypoxia differential includes the blast crisis, pneumonia, and congestive heart failure. CT scan looks the  same as it has on her previous hospitalization when she had blast crisis and cultures were negative at that time.  The groundglass opacities could very well reflect the blast crisis and the inflammatory response for all these white blood cells we will continue to treat with cefepime for now just cover of the bases.  Congestive heart failure could be possible but for now I am more concerned about the blast crisis if necessary we will give Lasix.  Pulmonary consulted.  The patient seems to have severe case of recurrent pneumonitis.  She never qualifies for oxygen during walk test but requires 2 L shortly after passing it.  She also desaturates during sleep.  Very difficult discharge without oxygen.  3.  Acute on chronic combined congestive heart failure -patient may be in congestive heart failure but concerned about the blast crisis did not want to dehydrate the patient with all those white blood cells in the system and have an increased risk of tumor lysis syndrome if hematology stops the crisis.  However her shortness of breath worsens we will give Lasix  4.  History of pulmonary embolus and continue rivaroxaban  5.  Type 2 diabetes on insulin therapy placed her on prandial long-acting and correction  6.  Chronic pain patient was having a lot of pain I believe this was contributing to her tachycardia have oxycodone available she is on narcotics for a long time and it openly admits that she is dependent  7.  Anxiety and benzodiazepine dependent continued alprazolam as needed and added Remeron at night and continued her Celexa     Oncology on board, will follow recommendation.          DVT prophylaxis:  Medical DVT prophylaxis orders are present.     CODE STATUS:    Level Of Support Discussed With: Patient  Code Status (Patient has no pulse and is not breathing): CPR (Attempt to Resuscitate)  Medical Interventions (Patient has pulse or is breathing): Full Support           This patient has been examined wearing  appropriate Personal Protective Equipment and discussed with patient and nursing   08/03/22      Electronically signed by Villa Patel MD, 08/03/22, 17:07 EDT.  [facility] Hospitalist Team

## 2022-08-03 NOTE — PROGRESS NOTES
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)    Depression with anxiety    History of pulmonary embolism    Type 2 diabetes mellitus with diabetic polyneuropathy, with long-term current use of insulin (HCC)    Acute diastolic CHF (congestive heart failure) (HCC)    NICM (nonischemic cardiomyopathy) ([facility])    Dyspnea, unspecified type    Chronic pain    Narcotic dependence (HCC)    Acute respiratory failure with hypoxia ([facility])      Assessment  Recurrent respiratory failure w/hypoxia  CML blast crisis  Dyspnea  Hx tumor lysis syndrome  Hx COVID-19 in January 2022  Chronic pain  Nonischemic cardiomyopathy  Acute diastolic CHF  T2DM  H/O PE  Depression/anxiety     Chronic severe bilateral groundglass opacities noted on CT scans since January 2022 s/p COVID-19     Results for orders placed during the hospital encounter of 06/29/22     Adult Transthoracic Echo Complete W/ Cont if Necessary Per Protocol     Interpretation Summary  · The left ventricular cavity is mildly dilated.  · Left ventricular wall thickness is consistent with mild concentric hypertrophy.  · Left ventricular ejection fraction appears to be 41 - 45%.  · Left ventricular diastolic function is consistent with (grade Ia w/high LAP) impaired relaxation.           Last bronchoscopy 6/6/2022 for immunocompromised and groundglass opacities  - All cultures negative except fungal cultures positive for Candida species which is clinically insignificant  - No significant findings noted     Bone marrow biopsy 7/6/2022   -pending     .00 on admission 7/27/2022-currently 108.60  Blood cultures negative   MRSA and respiratory panel negative        Plan    Overnight oximetry test:  -Patient O2 sat dropped below 88% and was placed on 2 L nasal cannula     Continue to titrate oxygen- Currently on 2L NC  Cefepime  prednisone to 40 mg 3 times daily for 5 days followed by twice daily for 5 days then 40 mg daily until seen  outpatient  Mucinex  DuoNebs as needed  Electrolyte/Glycemic control  DVT/GI Prophylaxis-Xarelto hx PE and Protonix  Palliative following     Patient will need 6-minute walk prior to discharge    The CT scan of the chest is compatible with severe pneumonitis which has not improved since June , 2022 and appears to have worsened since January 2022 although all the cultures from the bronchoscopy did not point to an infectious etiology        LOS: 6 days     Subjective     Patient resting in bed quietly.  In no distress at this time.  Vital signs stable on monitor.  Patient reports breathing is okay at this moment.  Cough continues.  Informed patient she has chronic pneumonitis that is causing her the shortness of breath and cough.  Her high WBCs may be contributing to this.  No current concerns for pulmonary infection.    Objective     Vital signs for last 24 hours:  Vitals:    08/02/22 2123 08/03/22 0149 08/03/22 0444 08/03/22 0931   BP: 141/82 110/68 129/77 116/73   BP Location: Right arm Right arm Right arm    Patient Position: Lying Lying Lying    Pulse: 117 105 101 106   Resp: 20 18 18 18   Temp: 98.3 °F (36.8 °C) 97.9 °F (36.6 °C) 97.7 °F (36.5 °C) 98.2 °F (36.8 °C)   TempSrc: Oral Oral Oral Oral   SpO2: 98% 95% 95% 96%   Weight:   61.6 kg (135 lb 12.9 oz)    Height:           Intake/Output last 3 shifts:  I/O last 3 completed shifts:  In: 1300 [P.O.:1200; IV Piggyback:100]  Out: -   Intake/Output this shift:  No intake/output data recorded.      Radiology  Imaging Results (Last 24 Hours)       ** No results found for the last 24 hours. **            Labs:  Results from last 7 days   Lab Units 08/03/22  0426   WBC 10*3/mm3 112.10*   HEMOGLOBIN g/dL 8.7*   HEMATOCRIT % 29.2*   PLATELETS 10*3/mm3 95*     Results from last 7 days   Lab Units 08/03/22  0426 08/02/22  0644   SODIUM mmol/L 133* 137   POTASSIUM mmol/L 5.7* 4.4   CHLORIDE mmol/L 99 101   CO2 mmol/L 24.0 25.0   BUN mg/dL 27* 29*   CREATININE mg/dL 0.57  0.70   CALCIUM mg/dL 9.2 9.1   BILIRUBIN mg/dL  --  1.1   ALK PHOS U/L  --  609*   ALT (SGPT) U/L  --  35*   AST (SGOT) U/L  --  28   GLUCOSE mg/dL 424* 231*     Results from last 7 days   Lab Units 07/27/22  2112   PH, ARTERIAL pH units 7.484*   PO2 ART mm Hg 57.7*   PCO2, ARTERIAL mm Hg 29.5*   HCO3 ART mmol/L 22.2     Results from last 7 days   Lab Units 08/02/22  0644 08/01/22  0326 07/31/22  0327   ALBUMIN g/dL 3.70 3.50 3.90     Results from last 7 days   Lab Units 07/27/22  2109   CK TOTAL U/L 19*   TROPONIN T ng/mL <0.010         Results from last 7 days   Lab Units 08/03/22  0426   MAGNESIUM mg/dL 2.2                   Meds:   SCHEDULE  allopurinol, 300 mg, Oral, Daily  cefepime, 2 g, Intravenous, Q8H  citalopram, 10 mg, Oral, Daily  folic acid, 1 mg, Oral, Daily  gabapentin, 300 mg, Oral, TID  guaiFENesin, 600 mg, Oral, Q12H  hydroxyurea, 1,000 mg, Oral, BID  insulin glargine, 25 Units, Subcutaneous, Nightly  insulin lispro, 0-24 Units, Subcutaneous, 4x Daily With Meals & Nightly  insulin lispro, 5 Units, Subcutaneous, TID With Meals  insulin regular, 16 Units, Subcutaneous, Q6H  insulin regular, 16 Units, Intravenous, Once  losartan, 25 mg, Oral, Q24H  methylPREDNISolone sodium succinate, 40 mg, Intravenous, Q6H  metoprolol succinate XL, 25 mg, Oral, Q24H  mirtazapine, 15 mg, Oral, Nightly  rivaroxaban, 20 mg, Oral, Daily With Dinner  sodium chloride, 10 mL, Intravenous, Q12H  sodium zirconium cyclosilicate, 10 g, Oral, Once  traZODone, 50 mg, Oral, Nightly      Infusions     PRNs    acetaminophen **OR** acetaminophen **OR** acetaminophen    ALPRAZolam    aluminum-magnesium hydroxide-simethicone    dextrose    dextrose    glucagon (human recombinant)    hydrALAZINE    HYDROcodone-acetaminophen    ipratropium-albuterol    melatonin    nitroglycerin    ondansetron **OR** ondansetron    oxyCODONE    [COMPLETED] Insert peripheral IV **AND** sodium chloride    sodium chloride    Physical Exam:  Physical  Exam  General Appearance:  Alert. No distress noted.  Thin.  HEENT:  Normocephalic, without obvious abnormality, Conjunctiva/corneas clear,.  Normal external ear canals, Nares normal, no drainage     Neck:  Supple, symmetrical, trachea midline. No JVD.  Lungs /Chest wall: Few basal rales bilaterally and slightly diminished bases bilaterally, respirations unlabored symmetrical wall movement.     Heart:  Regular rate and rhythm, systolic murmur PMI left sternal border  Abdomen: Soft, non-tender, no masses, no organomegaly.    Extremities: No edema, no clubbing or cyanosis      ROS  Review of Systems  Constitutional: Negative for chills, fever and positive malaise/fatigue.   HENT: Negative.    Eyes: Negative.    Cardiovascular: Negative.    Respiratory: Positive for chronic cough and shortness of breath with exertion.    Skin: Negative.    Musculoskeletal: Tender knot in her back that she believes is from coughing so much  Gastrointestinal: Negative.    Genitourinary: Negative.    Neurological: Negative.    Psychiatric/Behavioral: Negative.            I have reviewed current clinicals.     Electronically signed by JP White, 08/03/22, 9:54 AM EDT.     The NP scribed the note for me, I have examined the patient and reviewed and verified the above findings and and I formulated the assessment and plan as documented

## 2022-08-03 NOTE — PLAN OF CARE
Goal Outcome Evaluation:           Progress: no change     Patient is alert and able to make needs known. Patient continues with complaints of pain and discomfort relieved by prn pain medication. Patient continues with elevated blood sugars, patient verbalizes understanding of the cause of her elevated sugars. Patient also continues with complaints of anxiety. Call light remains in reach will continue monitoring.

## 2022-08-03 NOTE — CASE MANAGEMENT/SOCIAL WORK
Continued Stay Note  ZOHAIB Amor     Patient Name: Nieves Mc  MRN: [id number]  Today's Date: 8/3/2022    Admit Date: 7/27/2022     Discharge Plan   Row Name 08/03/22 7542       Plan    Plan Home with family. Palliative following.    Plan Comments MD asked if patient could go home on O2 as needed at night based on overnight sleep study. Per [facility]'s liaison Dorinda, patient would need to desat more than 5 minutes before O2 placed on for recovery, patient does not qualify. CM updated MD.       Row Name 08/03/22 1035       Plan    Plan Home with family. Palliative following.    Plan Comments Patient will discharge home with family. 6 minute walk complete, no O2 needs. Palliative following. D/C barriers: increased WBC, decreased PLTs, 2L NC, IV steroids, IV abx, hem/onc, pulm, palliative following.               Phone communication or documentation only - no physical contact with patient or family.      Sweta Chawla RN

## 2022-08-03 NOTE — PROGRESS NOTES
Hematology/Oncology Inpatient Progress Note    PATIENT NAME: Nieves Mc  : 1975  MRN: [id number]    CHIEF COMPLAINT: Shortness of breath    HISTORY OF PRESENT ILLNESS:      Nieves Mc is a 46 y.o. female who presented to [facility] on 2022 with complaints of SOA. Labs showed , Hgb 8.5, Platelets 306, Blasts 26%. Uric Acid 9.0, Alk Phos 875. CT scan of the chest showing extensive groundglass attenuation throughout the lungs is likely related to an inflammatory or atypical infectious process. A component of edema should also be a consideration. Covid pneumonia would be in differential diagnosis as well. Patient was started on antibiotics in the ER.        22  Hematology/Oncology was consulted for established patient with CML presenting with 26% blasts on CBC.  Patient is currently on Hydrea with history of noncompliance with medications. Recent hospitalization 2022 to 2022 for same. At that time hydrea was continued. Last TKI was Tasigna and it was discontinued due to concerns that heart disease was a complication of treatment.   · 2022 echocardiogram done showing EF 41-45%  · 2022 discharge from [facility] on hydroxyurea 500 mg daily  · 2022 proBNP 13,362  · 2022 LDH is high at 1974  · 2022 pRBC transfused for Hgb 6.7     Patient with history of CML, noncompliant with medicines including TKI's.  Patient with multiple hospitalizations.  Was in the hospital discharged on 2022 on hydroxyurea 500 mg daily.     Patient is admitted to the hospital with dyspnea.  CT of the chest shows groundglass opacifications which was secondary to inflammatory or atypical infectious process     She  has a past medical history of Bone pain, COVID-19 virus infection (2022), Diabetes mellitus (HCC), Extremity pain, Leg pain, Migraine, Pulmonary embolism (HCC), and Vision loss.     PCP: Alida Latham APRN    History of present illness was reviewed  and is unchanged from the previous visit. 07/31/22    Subjective      Patient has no new issues today.    ROS:    Review of Systems   Constitutional: Positive for fatigue. Negative for chills and fever.   HENT: Negative for congestion, drooling, ear discharge, rhinorrhea, sinus pressure and tinnitus.    Eyes: Negative for photophobia, pain and discharge.   Respiratory: Negative for apnea, choking and stridor.    Cardiovascular: Negative for palpitations.   Gastrointestinal: Negative for abdominal distention, abdominal pain and anal bleeding.   Endocrine: Negative for polydipsia and polyphagia.   Genitourinary: Negative for decreased urine volume, flank pain and genital sores.   Musculoskeletal: Negative for gait problem, neck pain and neck stiffness.   Skin: Negative for color change, rash and wound.   Neurological: Positive for weakness. Negative for tremors, seizures, syncope, facial asymmetry and speech difficulty.   Hematological: Negative for adenopathy.   Psychiatric/Behavioral: Negative for agitation, confusion, hallucinations and self-injury. The patient is not hyperactive.         MEDICATIONS:    Scheduled Meds:  allopurinol, 300 mg, Oral, Daily  cefepime, 2 g, Intravenous, Q8H  citalopram, 10 mg, Oral, Daily  folic acid, 1 mg, Oral, Daily  gabapentin, 300 mg, Oral, TID  guaiFENesin, 600 mg, Oral, Q12H  hydroxyurea, 1,000 mg, Oral, BID  insulin glargine, 25 Units, Subcutaneous, Nightly  insulin lispro, 0-24 Units, Subcutaneous, 4x Daily With Meals & Nightly  insulin lispro, 5 Units, Subcutaneous, TID With Meals  insulin regular, 16 Units, Subcutaneous, Q6H  losartan, 25 mg, Oral, Q24H  methylPREDNISolone sodium succinate, 40 mg, Intravenous, Q6H  metoprolol succinate XL, 25 mg, Oral, Q24H  mirtazapine, 15 mg, Oral, Nightly  rivaroxaban, 20 mg, Oral, Daily With Dinner  sodium chloride, 10 mL, Intravenous, Q12H  traZODone, 50 mg, Oral, Nightly       Continuous Infusions:      PRN Meds:  •  acetaminophen  "**OR** acetaminophen **OR** acetaminophen  •  ALPRAZolam  •  aluminum-magnesium hydroxide-simethicone  •  dextrose  •  dextrose  •  glucagon (human recombinant)  •  hydrALAZINE  •  HYDROcodone-acetaminophen  •  ipratropium-albuterol  •  melatonin  •  nitroglycerin  •  ondansetron **OR** ondansetron  •  oxyCODONE  •  [COMPLETED] Insert peripheral IV **AND** sodium chloride  •  sodium chloride     ALLERGIES:    Allergies   Allergen Reactions   • Hydromorphone GI Intolerance     dilaudid   • Morphine Nausea And Vomiting   • Propofol Hives     Previously tolerated being premedicated with diphenhydramine and famotadine       Objective    VITALS:   /89   Pulse 111   Temp 98 °F (36.7 °C) (Oral)   Resp 20   Ht 162.6 cm (64\")   Wt 61.6 kg (135 lb 12.9 oz)   SpO2 98%   BMI 23.31 kg/m²     PHYSICAL EXAM:     Physical Exam  Vitals and nursing note reviewed.   Constitutional:       General: She is not in acute distress.     Appearance: She is not diaphoretic.   HENT:      Head: Normocephalic and atraumatic.   Eyes:      General: No scleral icterus.        Right eye: No discharge.         Left eye: No discharge.      Conjunctiva/sclera: Conjunctivae normal.   Neck:      Thyroid: No thyromegaly.   Cardiovascular:      Rate and Rhythm: Normal rate and regular rhythm.      Heart sounds: Normal heart sounds.     No friction rub. No gallop.   Pulmonary:      Effort: Pulmonary effort is normal. No respiratory distress.      Breath sounds: No stridor. No wheezing.   Abdominal:      General: Bowel sounds are normal.      Palpations: Abdomen is soft. There is no mass.      Tenderness: There is no abdominal tenderness. There is no guarding or rebound.   Musculoskeletal:         General: No tenderness. Normal range of motion.      Cervical back: Normal range of motion and neck supple.   Lymphadenopathy:      Cervical: No cervical adenopathy.   Skin:     General: Skin is warm.      Findings: Bruising present. No erythema or " rash.      Comments: On her back   Neurological:      Mental Status: She is alert and oriented to person, place, and time.      Motor: No abnormal muscle tone.   Psychiatric:         Behavior: Behavior normal.       I have reexamined the patient and the results are consistent with the previously documented exam. Meghann Lerma MD     RECENT LABS:    Lab Results (last 24 hours)     Procedure Component Value Units Date/Time    POC Glucose Once [[id number]]  (Abnormal) Collected: 08/03/22 1632    Specimen: Blood Updated: 08/03/22 1634     Glucose 310 mg/dL      Comment: Serial Number: [id number]:  [id number]       Potassium [[id number]]  (Normal) Collected: 08/03/22 1301    Specimen: Blood Updated: 08/03/22 1324     Potassium 4.4 mmol/L      Comment: Slight hemolysis detected by analyzer. Results may be affected.       POC Glucose Once [[id number]]  (Abnormal) Collected: 08/03/22 1109    Specimen: Blood Updated: 08/03/22 1110     Glucose 422 mg/dL      Comment: Serial Number: [id number]:  [id number]       Uric Acid [[id number]]  (Normal) Collected: 08/03/22 0426    Specimen: Blood Updated: 08/03/22 1023     Uric Acid 4.6 mg/dL     POC Glucose Once [[id number]]  (Abnormal) Collected: 08/03/22 0710    Specimen: Blood Updated: 08/03/22 0711     Glucose 457 mg/dL      Comment: Serial Number: [id number]:  [id number]       Manual Differential [[id number]]  (Abnormal) Collected: 08/03/22 0426    Specimen: Blood Updated: 08/03/22 0653     Neutrophil % 43.0 %      Lymphocyte % 11.0 %      Monocyte % 4.0 %      Eosinophil % 3.0 %      Basophil % 8.0 %      Bands %  5.0 %      Metamyelocyte % 6.0 %      Promyelocyte % 3.0 %      Blasts % 17.0 %      Neutrophils Absolute 53.81 10*3/mm3      Lymphocytes Absolute 12.33 10*3/mm3      Monocytes Absolute 4.48 10*3/mm3      Eosinophils Absolute 3.36 10*3/mm3      Basophils Absolute 8.97 10*3/mm3      Anisocytosis Slight/1+     Poikilocytes Slight/1+     WBC  Morphology Normal     Large Platelets Slight/1+    Narrative:      Reviewed by Pathologist within the past 30 days on [id number] .      CBC & Differential [[id number]]  (Abnormal) Collected: 08/03/22 0426    Specimen: Blood Updated: 08/03/22 0653    Narrative:      The following orders were created for panel order CBC & Differential.  Procedure                               Abnormality         Status                     ---------                               -----------         ------                     CBC Auto Differential[[id number]]        Abnormal            Final result               Scan Slide[[id number]]                                       Final result                 Please view results for these tests on the individual orders.    Scan Slide [[id number]] Collected: 08/03/22 0426    Specimen: Blood Updated: 08/03/22 0653     Scan Slide --     Comment: See Manual Differential Results       CBC Auto Differential [[id number]]  (Abnormal) Collected: 08/03/22 0426    Specimen: Blood Updated: 08/03/22 0653     .10 10*3/mm3      RBC 3.44 10*6/mm3      Hemoglobin 8.7 g/dL      Hematocrit 29.2 %      MCV 84.8 fL      MCH 25.2 pg      MCHC 29.7 g/dL      RDW 21.4 %      RDW-SD 62.1 fl      MPV 7.4 fL      Platelets 95 10*3/mm3     Narrative:      The previously reported component NRBC is no longer being reported. Previous result was 0.6 /100 WBC (Reference Range: 0.0-0.2 /100 WBC) on 8/3/2022 at 0514 EDT.    Basic Metabolic Panel [[id number]]  (Abnormal) Collected: 08/03/22 0426    Specimen: Blood Updated: 08/03/22 0545     Glucose 424 mg/dL      BUN 27 mg/dL      Creatinine 0.57 mg/dL      Sodium 133 mmol/L      Potassium 5.7 mmol/L      Chloride 99 mmol/L      CO2 24.0 mmol/L      Calcium 9.2 mg/dL      BUN/Creatinine Ratio 47.4     Anion Gap 10.0 mmol/L      eGFR 113.7 mL/min/1.73      Comment: National Kidney Foundation and American Society of Nephrology (ASN) Task Force recommended calculation based on the  Chronic Kidney Disease Epidemiology Collaboration (CKD-EPI) equation refit without adjustment for race.       Narrative:      GFR Normal >60  Chronic Kidney Disease <60  Kidney Failure <15      Phosphorus [[id number]]  (Normal) Collected: 08/03/22 0426    Specimen: Blood Updated: 08/03/22 0543     Phosphorus 4.4 mg/dL     Magnesium [[id number]]  (Normal) Collected: 08/03/22 0426    Specimen: Blood Updated: 08/03/22 0543     Magnesium 2.2 mg/dL     POC Glucose Once [[id number]]  (Abnormal) Collected: 08/02/22 2004    Specimen: Blood Updated: 08/02/22 2006     Glucose 485 mg/dL      Comment: Serial Number: [id number]:  [id number]             PENDING RESULTS:     IMAGING REVIEWED:  No radiology results for the last day    Assessment & Plan      ASSESSMENT:    1. CML with hyperleukocytosis :  Has been initiated on IVF, steroids, allopurinol and hydroxyurea has been continued. Hx. of non-compliance with treatment.  Her white count is beginning to drop on hydroxyurea.  WBC decreased to 115.7 today from 135.6 yesterday . Continue to monitor CBC daily. Her leukocytosis is stable.  Blast count is down to 14% to 16%.  She has no evidence of tumor lysis syndrome.  Overall her counts continue to decrease with hydroxyurea.  Continue daily CBCs    2. Significant anemia secondary to malignancy, hydroxyurea.  Patient receiving PRBC's as needed. Hgb 8.1 stable.    3. History of PE: on rivaroxaban.  She will continue the same.  No bleeding issues    4. Acute Resp Failure/Acute on Chronic CHF/Type 2 DM-managed per primary team    5. Anxiety/Depression    6. Chronic Pain     PLANS:    1. Continue hydroxyurea to 1 gm BID. Continue to look for TKI options for her given her underlying cardiac disease.   2. Consult Palliative Care to discuss goals of care. Patient is still interested in pursuing treatment.  3. Transfuse PRBC's as needed  4. Daily CBCs  5. Continue supportive care  6. Discussed with patient  7. After discharge we will  see her in the office to discuss options of treatment           Electronically signed by Meghann Lerma MD, 08/03/22, 5:22 PM EDT.

## 2022-08-03 NOTE — DISCHARGE PLACEMENT REQUEST
"J Luis Kowalski (46 y.o. Female)             Date of Birth   1975    Social Security Number       Address   [address] [place] [address] [place] [facility]    Home Phone   [phone]    MRN   [id number]       Anabaptism   None    Marital Status                               Admission Date   7/27/22    Admission Type   Emergency    Admitting Provider   Villa Patel MD    Attending Provider   Villa Patel MD    Department, Room/Bed   [facility], 2131/1       Discharge Date       Discharge Disposition       Discharge Destination                               Attending Provider: Villa Patel MD    Allergies: Hydromorphone, Morphine, Propofol    Isolation: None   Infection: None   Code Status: CPR   Advance Care Planning Activity    Ht: 162.6 cm (64\")   Wt: 61.6 kg (135 lb 12.9 oz)    Admission Cmt: None   Principal Problem: 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

## 2022-08-04 DIAGNOSIS — C95.90 LEUKEMIA NOT HAVING ACHIEVED REMISSION, UNSPECIFIED LEUKEMIA TYPE: ICD-10-CM

## 2022-08-04 LAB
ANION GAP SERPL CALCULATED.3IONS-SCNC: 11 MMOL/L (ref 5–15)
ANISOCYTOSIS BLD QL: ABNORMAL
BASOPHILS # BLD MANUAL: 7.41 10*3/MM3 (ref 0–0.2)
BASOPHILS NFR BLD MANUAL: 8 % (ref 0–1.5)
BLASTS NFR BLD MANUAL: 13 % (ref 0–0)
BUN SERPL-MCNC: 28 MG/DL (ref 6–20)
BUN/CREAT SERPL: 56 (ref 7–25)
CALCIUM SPEC-SCNC: 9.5 MG/DL (ref 8.6–10.5)
CHLORIDE SERPL-SCNC: 105 MMOL/L (ref 98–107)
CO2 SERPL-SCNC: 22 MMOL/L (ref 22–29)
CREAT SERPL-MCNC: 0.5 MG/DL (ref 0.57–1)
CRP SERPL-MCNC: <0.3 MG/DL (ref 0–0.5)
DEPRECATED RDW RBC AUTO: 66.5 FL (ref 37–54)
EGFRCR SERPLBLD CKD-EPI 2021: 117.3 ML/MIN/1.73
EOSINOPHIL # BLD MANUAL: 1.85 10*3/MM3 (ref 0–0.4)
EOSINOPHIL NFR BLD MANUAL: 2 % (ref 0.3–6.2)
ERYTHROCYTE [DISTWIDTH] IN BLOOD BY AUTOMATED COUNT: 22.6 % (ref 12.3–15.4)
ERYTHROCYTE [SEDIMENTATION RATE] IN BLOOD: 13 MM/HR (ref 0–20)
GLUCOSE BLDC GLUCOMTR-MCNC: 234 MG/DL (ref 70–105)
GLUCOSE BLDC GLUCOMTR-MCNC: 291 MG/DL (ref 70–105)
GLUCOSE BLDC GLUCOMTR-MCNC: 381 MG/DL (ref 70–105)
GLUCOSE BLDC GLUCOMTR-MCNC: 433 MG/DL (ref 70–105)
GLUCOSE BLDC GLUCOMTR-MCNC: 443 MG/DL (ref 70–105)
GLUCOSE SERPL-MCNC: 227 MG/DL (ref 65–99)
HCT VFR BLD AUTO: 28.5 % (ref 34–46.6)
HGB BLD-MCNC: 8.3 G/DL (ref 12–15.9)
LYMPHOCYTES # BLD MANUAL: 7.41 10*3/MM3 (ref 0.7–3.1)
LYMPHOCYTES NFR BLD MANUAL: 1 % (ref 5–12)
MAGNESIUM SERPL-MCNC: 1.8 MG/DL (ref 1.6–2.6)
MCH RBC QN AUTO: 25.1 PG (ref 26.6–33)
MCHC RBC AUTO-ENTMCNC: 29.2 G/DL (ref 31.5–35.7)
MCV RBC AUTO: 85.8 FL (ref 79–97)
METAMYELOCYTES NFR BLD MANUAL: 6 % (ref 0–0)
MONOCYTES # BLD: 0.93 10*3/MM3 (ref 0.1–0.9)
MYELOCYTES NFR BLD MANUAL: 5 % (ref 0–0)
NEUTROPHILS # BLD AUTO: 52.78 10*3/MM3 (ref 1.7–7)
NEUTROPHILS NFR BLD MANUAL: 52 % (ref 42.7–76)
NEUTS BAND NFR BLD MANUAL: 5 % (ref 0–5)
NRBC SPEC MANUAL: 2 /100 WBC (ref 0–0.2)
PHOSPHATE SERPL-MCNC: 4.2 MG/DL (ref 2.5–4.5)
PLAT MORPH BLD: NORMAL
PLATELET # BLD AUTO: 95 10*3/MM3 (ref 140–450)
PMV BLD AUTO: 7.7 FL (ref 6–12)
POIKILOCYTOSIS BLD QL SMEAR: ABNORMAL
POTASSIUM SERPL-SCNC: 5.2 MMOL/L (ref 3.5–5.2)
RBC # BLD AUTO: 3.33 10*6/MM3 (ref 3.77–5.28)
SCAN SLIDE: NORMAL
SODIUM SERPL-SCNC: 138 MMOL/L (ref 136–145)
VARIANT LYMPHS NFR BLD MANUAL: 8 % (ref 19.6–45.3)
WBC MORPH BLD: NORMAL
WBC NRBC COR # BLD: 92.6 10*3/MM3 (ref 3.4–10.8)

## 2022-08-04 PROCEDURE — 25010000002 METHYLPREDNISOLONE PER 40 MG: Performed by: NURSE PRACTITIONER

## 2022-08-04 PROCEDURE — 63710000001 INSULIN LISPRO (HUMAN) PER 5 UNITS: Performed by: INTERNAL MEDICINE

## 2022-08-04 PROCEDURE — 99232 SBSQ HOSP IP/OBS MODERATE 35: CPT | Performed by: INTERNAL MEDICINE

## 2022-08-04 PROCEDURE — 94618 PULMONARY STRESS TESTING: CPT

## 2022-08-04 PROCEDURE — 63710000001 INSULIN REGULAR HUMAN PER 5 UNITS: Performed by: INTERNAL MEDICINE

## 2022-08-04 PROCEDURE — 86140 C-REACTIVE PROTEIN: CPT | Performed by: INTERNAL MEDICINE

## 2022-08-04 PROCEDURE — 82962 GLUCOSE BLOOD TEST: CPT

## 2022-08-04 PROCEDURE — 83735 ASSAY OF MAGNESIUM: CPT | Performed by: INTERNAL MEDICINE

## 2022-08-04 PROCEDURE — 85007 BL SMEAR W/DIFF WBC COUNT: CPT | Performed by: NURSE PRACTITIONER

## 2022-08-04 PROCEDURE — 85025 COMPLETE CBC W/AUTO DIFF WBC: CPT | Performed by: NURSE PRACTITIONER

## 2022-08-04 PROCEDURE — 85652 RBC SED RATE AUTOMATED: CPT | Performed by: INTERNAL MEDICINE

## 2022-08-04 PROCEDURE — 25010000002 CEFEPIME PER 500 MG: Performed by: INTERNAL MEDICINE

## 2022-08-04 PROCEDURE — 84100 ASSAY OF PHOSPHORUS: CPT | Performed by: INTERNAL MEDICINE

## 2022-08-04 PROCEDURE — 80048 BASIC METABOLIC PNL TOTAL CA: CPT | Performed by: INTERNAL MEDICINE

## 2022-08-04 PROCEDURE — 63710000001 INSULIN GLARGINE PER 5 UNITS: Performed by: INTERNAL MEDICINE

## 2022-08-04 RX ORDER — HYDROCODONE BITARTRATE AND ACETAMINOPHEN 10; 325 MG/1; MG/1
1 TABLET ORAL EVERY 6 HOURS PRN
Status: DISCONTINUED | OUTPATIENT
Start: 2022-08-04 | End: 2022-08-05 | Stop reason: HOSPADM

## 2022-08-04 RX ORDER — INSULIN LISPRO 100 [IU]/ML
8 INJECTION, SOLUTION INTRAVENOUS; SUBCUTANEOUS
Status: DISCONTINUED | OUTPATIENT
Start: 2022-08-04 | End: 2022-08-05 | Stop reason: HOSPADM

## 2022-08-04 RX ORDER — ALPRAZOLAM 0.25 MG/1
0.25 TABLET ORAL 2 TIMES DAILY PRN
Status: DISCONTINUED | OUTPATIENT
Start: 2022-08-04 | End: 2022-08-05 | Stop reason: HOSPADM

## 2022-08-04 RX ORDER — OXYCODONE HYDROCHLORIDE 5 MG/1
10 TABLET ORAL EVERY 6 HOURS PRN
Status: DISCONTINUED | OUTPATIENT
Start: 2022-08-04 | End: 2022-08-05 | Stop reason: HOSPADM

## 2022-08-04 RX ORDER — CITALOPRAM 10 MG/1
10 TABLET ORAL DAILY
Qty: 30 TABLET | Refills: 1 | Status: CANCELLED | OUTPATIENT
Start: 2022-08-04

## 2022-08-04 RX ADMIN — RIVAROXABAN 20 MG: 20 TABLET, FILM COATED ORAL at 17:40

## 2022-08-04 RX ADMIN — INSULIN HUMAN 16 UNITS: 100 INJECTION, SOLUTION PARENTERAL at 02:28

## 2022-08-04 RX ADMIN — INSULIN LISPRO 24 UNITS: 100 INJECTION, SOLUTION INTRAVENOUS; SUBCUTANEOUS at 17:40

## 2022-08-04 RX ADMIN — TRAZODONE HYDROCHLORIDE 50 MG: 50 TABLET ORAL at 20:38

## 2022-08-04 RX ADMIN — INSULIN LISPRO 8 UNITS: 100 INJECTION, SOLUTION INTRAVENOUS; SUBCUTANEOUS at 17:40

## 2022-08-04 RX ADMIN — HYDROXYUREA 1000 MG: 500 CAPSULE ORAL at 20:36

## 2022-08-04 RX ADMIN — GABAPENTIN 300 MG: 300 CAPSULE ORAL at 20:37

## 2022-08-04 RX ADMIN — GUAIFENESIN 600 MG: 600 TABLET, EXTENDED RELEASE ORAL at 20:38

## 2022-08-04 RX ADMIN — ALLOPURINOL 300 MG: 300 TABLET ORAL at 08:36

## 2022-08-04 RX ADMIN — GUAIFENESIN 600 MG: 600 TABLET, EXTENDED RELEASE ORAL at 08:36

## 2022-08-04 RX ADMIN — HYDROXYUREA 1000 MG: 500 CAPSULE ORAL at 08:35

## 2022-08-04 RX ADMIN — INSULIN LISPRO 20 UNITS: 100 INJECTION, SOLUTION INTRAVENOUS; SUBCUTANEOUS at 13:18

## 2022-08-04 RX ADMIN — INSULIN HUMAN 16 UNITS: 100 INJECTION, SOLUTION PARENTERAL at 13:18

## 2022-08-04 RX ADMIN — Medication 10 ML: at 19:57

## 2022-08-04 RX ADMIN — INSULIN HUMAN 16 UNITS: 100 INJECTION, SOLUTION PARENTERAL at 19:53

## 2022-08-04 RX ADMIN — METOPROLOL SUCCINATE 25 MG: 25 TABLET, EXTENDED RELEASE ORAL at 08:36

## 2022-08-04 RX ADMIN — GABAPENTIN 300 MG: 300 CAPSULE ORAL at 17:40

## 2022-08-04 RX ADMIN — MIRTAZAPINE 15 MG: 15 TABLET, FILM COATED ORAL at 20:38

## 2022-08-04 RX ADMIN — METHYLPREDNISOLONE SODIUM SUCCINATE 40 MG: 40 INJECTION, POWDER, FOR SOLUTION INTRAMUSCULAR; INTRAVENOUS at 13:19

## 2022-08-04 RX ADMIN — INSULIN LISPRO 8 UNITS: 100 INJECTION, SOLUTION INTRAVENOUS; SUBCUTANEOUS at 08:36

## 2022-08-04 RX ADMIN — CEFEPIME HYDROCHLORIDE 2 G: 2 INJECTION, POWDER, FOR SOLUTION INTRAVENOUS at 01:03

## 2022-08-04 RX ADMIN — INSULIN LISPRO 5 UNITS: 100 INJECTION, SOLUTION INTRAVENOUS; SUBCUTANEOUS at 08:38

## 2022-08-04 RX ADMIN — INSULIN LISPRO 24 UNITS: 100 INJECTION, SOLUTION INTRAVENOUS; SUBCUTANEOUS at 19:56

## 2022-08-04 RX ADMIN — HYDROCODONE BITARTRATE AND ACETAMINOPHEN 1 TABLET: 10; 325 TABLET ORAL at 21:14

## 2022-08-04 RX ADMIN — Medication 10 ML: at 20:39

## 2022-08-04 RX ADMIN — METHYLPREDNISOLONE SODIUM SUCCINATE 40 MG: 40 INJECTION, POWDER, FOR SOLUTION INTRAMUSCULAR; INTRAVENOUS at 08:36

## 2022-08-04 RX ADMIN — INSULIN GLARGINE 25 UNITS: 100 INJECTION, SOLUTION SUBCUTANEOUS at 20:36

## 2022-08-04 RX ADMIN — CITALOPRAM HYDROBROMIDE 10 MG: 20 TABLET ORAL at 08:36

## 2022-08-04 RX ADMIN — METHYLPREDNISOLONE SODIUM SUCCINATE 40 MG: 40 INJECTION, POWDER, FOR SOLUTION INTRAMUSCULAR; INTRAVENOUS at 02:28

## 2022-08-04 RX ADMIN — METHYLPREDNISOLONE SODIUM SUCCINATE 40 MG: 40 INJECTION, POWDER, FOR SOLUTION INTRAMUSCULAR; INTRAVENOUS at 19:56

## 2022-08-04 RX ADMIN — LOSARTAN POTASSIUM 25 MG: 25 TABLET, FILM COATED ORAL at 08:36

## 2022-08-04 RX ADMIN — GABAPENTIN 300 MG: 300 CAPSULE ORAL at 08:36

## 2022-08-04 RX ADMIN — FOLIC ACID 1 MG: 1 TABLET ORAL at 08:35

## 2022-08-04 RX ADMIN — INSULIN HUMAN 16 UNITS: 100 INJECTION, SOLUTION PARENTERAL at 08:38

## 2022-08-04 RX ADMIN — Medication 10 ML: at 08:38

## 2022-08-04 NOTE — PROGRESS NOTES
[facility] Medicine Services Daily Progress Note    Patient Name: Nieves Mc  : 1975  MRN: [id number]  Primary Care Physician:  Alida Latham APRN  Date of admission: 2022      Subjective    Chief Complaint: Recurrent shortness of breath/pneumonitis questionable pneumonia/        Patient Reports that she is feeling okay while at rest in bed.  On room air today.  Had a walk test and became profoundly short of breath but never dropped below 90%.  Heart rate remains 120 steady.  Glucose being managed by endocrinology with reading still 2-300 range.  Will maintain on cefepime IV and steroids 1 more day inpatient and send patient home in the morning with the understanding that she is very high risk for being readmitted very shortly after discharge.     ROS negative except as above    Objective      Vitals:   Temp:  [97.2 °F (36.2 °C)-98.7 °F (37.1 °C)] 97.4 °F (36.3 °C)  Heart Rate:  [] 113  Resp:  [18-20] 20  BP: (114-152)/(73-89) 152/77  Flow (L/min):  [2] 2    Physical Exam  Vitals reviewed.   Constitutional:       Comments: Sitting up in bed on room air   HENT:      Head: Normocephalic.      Nose: Nose normal.      Mouth/Throat:      Mouth: Mucous membranes are moist.   Cardiovascular:      Rate and Rhythm: Regular rhythm. Tachycardia present.      Pulses: Normal pulses.      Heart sounds: Normal heart sounds.   Pulmonary:      Effort: Pulmonary effort is normal.      Breath sounds: Rhonchi present.   Abdominal:      General: Abdomen is flat.      Palpations: Abdomen is soft.   Musculoskeletal:         General: Normal range of motion.      Cervical back: Normal range of motion.   Skin:     General: Skin is warm.   Neurological:      General: No focal deficit present.      Mental Status: She is alert and oriented to person, place, and time.   Psychiatric:         Mood and Affect: Mood normal.         Behavior: Behavior normal.             Result Review    Result  Review:  I have personally reviewed the results from the time of this admission to 8/4/2022 16:23 EDT and agree with these findings:  [x]  Laboratory  []  Microbiology  []  Radiology  []  EKG/Telemetry   []  Cardiology/Vascular   []  Pathology  []  Old records  []  Other:  Most notable findings include: Severely elevated WBC           Assessment & Plan       Brief Patient Summary:  Nieves Mc is a 46 y.o. female who presents with recurrent pneumonitis dyspnea        allopurinol, 300 mg, Oral, Daily  cefepime, 2 g, Intravenous, Q8H  citalopram, 10 mg, Oral, Daily  folic acid, 1 mg, Oral, Daily  gabapentin, 300 mg, Oral, TID  guaiFENesin, 600 mg, Oral, Q12H  hydroxyurea, 1,000 mg, Oral, BID  insulin glargine, 20 Units, Subcutaneous, Nightly  insulin lispro, 0-14 Units, Subcutaneous, TID AC  insulin lispro, 5 Units, Subcutaneous, TID With Meals  insulin regular, 8 Units, Subcutaneous, Daily With Breakfast  losartan, 25 mg, Oral, Q24H  metoprolol succinate XL, 25 mg, Oral, Q24H  mirtazapine, 15 mg, Oral, Nightly  predniSONE, 40 mg, Oral, Daily With Breakfast  rivaroxaban, 20 mg, Oral, Daily With Dinner  sodium chloride, 10 mL, Intravenous, Q12H  traZODone, 50 mg, Oral, Nightly            Active Hospital Problems:          Active Hospital Problems     Diagnosis     • **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)     • Dyspnea, unspecified type     • Chronic pain     • Narcotic dependence ([facility])     • Acute respiratory failure with hypoxia ([facility])     • Acute diastolic CHF (congestive heart failure) ([facility])     • NICM (nonischemic cardiomyopathy) ([facility])     • Type 2 diabetes mellitus with diabetic polyneuropathy, with long-term current use of insulin ([facility])     • History of pulmonary embolism     • Depression with anxiety        Plan:       1.  CML blast crisis -consulted hematology oncology, giving fluids, steroids, allopurinol, and continued hydroxyurea.  2.  Acute respiratory failure with hypoxia differential  includes the blast crisis, pneumonia, and congestive heart failure. CT scan looks the same as it has on her previous hospitalization when she had blast crisis and cultures were negative at that time.  The groundglass opacities could very well reflect the blast crisis and the inflammatory response for all these white blood cells we will continue to treat with cefepime for now just cover of the bases.  Congestive heart failure could be possible but for now I am more concerned about the blast crisis if necessary we will give Lasix.  Pulmonary consulted.  The patient seems to have severe case of recurrent pneumonitis.  She never qualifies for oxygen during walk test but requires 2 L shortly after passing it.  She also desaturates during sleep.  Very difficult discharge without oxygen.  Will discharge home August 5 a.m. if stable and hopefully minimize risk of readmission  3.  Acute on chronic combined congestive heart failure -patient may be in congestive heart failure but concerned about the blast crisis did not want to dehydrate the patient with all those white blood cells in the system and have an increased risk of tumor lysis syndrome if hematology stops the crisis.  However her shortness of breath worsens we will give Lasix  4.  History of pulmonary embolus and continue rivaroxaban  5.  Type 2 diabetes on insulin therapy placed her on prandial long-acting and correction  6.  Chronic pain patient was having a lot of pain I believe this was contributing to her tachycardia have oxycodone available she is on narcotics for a long time and it openly admits that she is dependent  7.  Anxiety and benzodiazepine dependent continued alprazolam as needed and added Remeron at night and continued her Celexa     Oncology on board, will follow recommendations          DVT prophylaxis:  Medical DVT prophylaxis orders are present.     CODE STATUS:    Level Of Support Discussed With: Patient  Code Status (Patient has no pulse and is  not breathing): CPR (Attempt to Resuscitate)  Medical Interventions (Patient has pulse or is breathing): Full Support           This patient has been examined wearing appropriate Personal Protective Equipment and discussed with patient and nursing   08/04/22      Electronically signed by Villa Patel MD, 08/04/22, 16:23 EDT.  [place] Hospitalist Team

## 2022-08-04 NOTE — PLAN OF CARE
Pt resting will continue to monitor  Problem: Adult Inpatient Plan of Care  Goal: Absence of Hospital-Acquired Illness or Injury  Intervention: Identify and Manage Fall Risk  Recent Flowsheet Documentation  Taken 8/4/2022 1600 by Yakov Abreu, RN  Safety Promotion/Fall Prevention:   safety round/check completed   room organization consistent   fall prevention program maintained   clutter free environment maintained  Taken 8/4/2022 1400 by Yakov Abreu, RN  Safety Promotion/Fall Prevention:   safety round/check completed   room organization consistent   fall prevention program maintained   clutter free environment maintained  Taken 8/4/2022 1200 by Yakov Abreu, RN  Safety Promotion/Fall Prevention:   safety round/check completed   room organization consistent   fall prevention program maintained   clutter free environment maintained  Taken 8/4/2022 1000 by Yakov Abreu, RN  Safety Promotion/Fall Prevention:   safety round/check completed   room organization consistent   fall prevention program maintained   clutter free environment maintained  Taken 8/4/2022 0900 by Yakov Abreu, RN  Safety Promotion/Fall Prevention:   safety round/check completed   room organization consistent   fall prevention program maintained   clutter free environment maintained  Intervention: Prevent Skin Injury  Recent Flowsheet Documentation  Taken 8/4/2022 0900 by Yakov Abreu, RN  Body Position: position changed independently  Intervention: Prevent and Manage VTE (Venous Thromboembolism) Risk  Recent Flowsheet Documentation  Taken 8/4/2022 0900 by Yakov Abreu RN  VTE Prevention/Management: dorsiflexion/plantar flexion performed  Intervention: Prevent Infection  Recent Flowsheet Documentation  Taken 8/4/2022 0900 by Yakov Abreu, RN  Infection Prevention: single patient room provided   Goal Outcome Evaluation:

## 2022-08-04 NOTE — CONSULTS
Inpatient Endocrine Consult  Consultation requested by hospitalist team for uncontrolled type 2 diabetes with hyperglycemia  Patient Care Team:  Alida Latham APRN as PCP - General (Nurse Practitioner)  Meghann Lerma MD as Consulting Physician (Hematology and Oncology)    Chief Complaint: Uncontrolled type 2 diabetes with hyperglycemia    HPI: This is a 46-year-old female with history of type 2 diabetes, chronic myeloid leukemia with blast crisis, hypertension, anxiety, depression is admitted with blast crisis phase of chronic myeloid leukemia as well as dyspnea.  Patient is currently on IV steroids and has significant hyperglycemia and is requested to be seen for diabetes management.  Patient at home takes Lantus and Humalog as basal bolus insulin therapy and she is feeling well.  No complaints at this time.    Past Medical History:   Diagnosis Date   • Bone pain    • COVID-19 virus infection 01/12/2022   • Diabetes mellitus (HCC)    • Extremity pain     Carlin. legs pain   • Leg pain     left leg greater   • Migraine    • Pulmonary embolism (HCC)    • Vision loss     doing surgery       Social History     Socioeconomic History   • Marital status:    Tobacco Use   • Smoking status: Never Smoker   • Smokeless tobacco: Never Used   Vaping Use   • Vaping Use: Never used   Substance and Sexual Activity   • Alcohol use: No   • Drug use: No   • Sexual activity: Defer       Family History   Problem Relation Age of Onset   • Diabetes Mother    • Diabetes Maternal Grandmother    • Heart attack Maternal Grandmother    • Stroke Maternal Grandmother        Allergies   Allergen Reactions   • Hydromorphone GI Intolerance     dilaudid   • Morphine Nausea And Vomiting   • Propofol Hives     Previously tolerated being premedicated with diphenhydramine and famotadine       ROS:   Constitutional:  Denies fatigue, tiredness.    Eyes:  Denies change in visual acuity   HENT:  Denies nasal congestion or sore  throat   Respiratory: Denies cough, shortness of breath.   Cardiovascular:  Denies chest pain, edema   GI:  Denies abdominal pain, nausea, vomiting.   :  Denies polyuria and polydipsia  Musculoskeletal:  Denies back pain or joint pain   Integument:  Denies dry skin, rash   Neurologic:  Denies headache, focal weakness or sensory changes   Endocrine:  Denies polyuria or polydipsia   Psychiatric:  Denies depression or anxiety      Vitals:    08/03/22 1633   BP: 140/89   Pulse: 111   Resp: 20   Temp: 98 °F (36.7 °C)   SpO2: 98%      Body mass index is 23.31 kg/m².     Physical Exam:  GEN: NAD, conversant  EYES: EOMI, PERRL, no conjunctival erythema  NECK: no thyromegaly, full ROM   CV: RRR, no murmurs/rubs/gallops, no peripheral edema  LUNG: CTAB, no wheezes/rales/rhonchi  SKIN: no rashes, no acanthosis  MSK: no deformities, full ROM of all extremities  NEURO: no tremors, DTR normal  PSYCH: AOX3, appropriate mood, affect normal      Results Review:     I reviewed the patient's new clinical results.    Lab Results   Component Value Date    GLUCOSE 424 (C) 08/03/2022    BUN 27 (H) 08/03/2022    CREATININE 0.57 08/03/2022    EGFRIFNONA 133 02/02/2022    BCR 47.4 (H) 08/03/2022    K 4.4 08/03/2022    CO2 24.0 08/03/2022    CALCIUM 9.2 08/03/2022    ALBUMIN 3.70 08/02/2022    LABIL2 1.0 04/18/2019    AST 28 08/02/2022    ALT 35 (H) 08/02/2022       Lab Results   Component Value Date    HGBA1C 7.1 (H) 07/28/2022    HGBA1C 8.4 (H) 06/29/2022    HGBA1C 10.2 (H) 06/02/2022     Lab Results   Component Value Date    CREATININE 0.57 08/03/2022     Results from last 7 days   Lab Units 08/03/22 2048 08/03/22  1632 08/03/22  1109 08/03/22  0710 08/02/22  2004 08/02/22  1629   GLUCOSE mg/dL 415* 310* 422* 457* 485* 493*       Medication Review: Reviewed.       Current Facility-Administered Medications:   •  acetaminophen (TYLENOL) tablet 650 mg, 650 mg, Oral, Q4H PRN **OR** acetaminophen (TYLENOL) 160 MG/5ML solution 650 mg, 650 mg,  Oral, Q4H PRN **OR** acetaminophen (TYLENOL) suppository 650 mg, 650 mg, Rectal, Q4H PRN, Brady Anna MD  •  allopurinol (ZYLOPRIM) tablet 300 mg, 300 mg, Oral, Daily, Meghann Lerma MD, 300 mg at 08/03/22 1054  •  ALPRAZolam (XANAX) tablet 0.25 mg, 0.25 mg, Oral, BID PRN, Brady Anna MD, 0.25 mg at 08/02/22 2108  •  aluminum-magnesium hydroxide-simethicone (MAALOX MAX) 400-400-40 MG/5ML suspension 15 mL, 15 mL, Oral, Q6H PRN, Brady Anna MD  •  cefepime 2 gm IVPB in 100 ml NS (MBP), 2 g, Intravenous, Q8H, Brady Anna MD, Last Rate: 0 mL/hr at 07/28/22 2100, 2 g at 08/03/22 1941  •  citalopram (CeleXA) tablet 10 mg, 10 mg, Oral, Daily, Brady Anna MD, 10 mg at 08/03/22 1054  •  dextrose (D50W) (25 g/50 mL) IV injection 25 g, 25 g, Intravenous, Q15 Min PRN, Brady Anna MD  •  dextrose (GLUTOSE) oral gel 15 g, 15 g, Oral, Q15 Min PRN, Brady Anna MD  •  folic acid (FOLVITE) tablet 1 mg, 1 mg, Oral, Daily, Keven Juarez MD, 1 mg at 08/03/22 1054  •  gabapentin (NEURONTIN) capsule 300 mg, 300 mg, Oral, TID, Brady Anna MD, 300 mg at 08/03/22 2145  •  glucagon (human recombinant) (GLUCAGEN DIAGNOSTIC) 1 mg in sterile water (preservative free) 1 mL injection, 1 mg, Subcutaneous, PRN, Brady Anna MD  •  guaiFENesin (MUCINEX) 12 hr tablet 600 mg, 600 mg, Oral, Q12H, Keven Juarez MD, 600 mg at 08/03/22 2145  •  hydrALAZINE (APRESOLINE) injection 10 mg, 10 mg, Intravenous, Q6H PRN, Brady Anna MD  •  HYDROcodone-acetaminophen (NORCO)  MG per tablet 1 tablet, 1 tablet, Oral, Q6H PRN, Brady Anna MD, 1 tablet at 08/01/22 1643  •  hydroxyurea (HYDREA) capsule 1,000 mg, 1,000 mg, Oral, BID, Denisha Gill, APRN, 1,000 mg at 08/03/22 2146  •  insulin glargine (LANTUS, SEMGLEE) injection 25 Units, 25 Units, Subcutaneous, Nightly, Villa Patel MD, 25 Units at 08/03/22 2149  •  insulin lispro (ADMELOG) injection 0-24 Units, 0-24 Units,  Subcutaneous, 4x Daily With Meals & Nightly, Villa Patel MD, 24 Units at 08/03/22 2148  •  insulin lispro (ADMELOG) injection 5 Units, 5 Units, Subcutaneous, TID With Meals, Brady Anna MD, 5 Units at 08/03/22 1941  •  insulin regular (humuLIN R,novoLIN R) injection 16 Units, 16 Units, Subcutaneous, Q6H, Villa Patel MD, 16 Units at 08/03/22 2147  •  ipratropium-albuterol (DUO-NEB) nebulizer solution 3 mL, 3 mL, Nebulization, Q4H PRN, Keven Juarez MD  •  losartan (COZAAR) tablet 25 mg, 25 mg, Oral, Q24H, Keven Juarez MD, 25 mg at 08/03/22 1055  •  melatonin tablet 5 mg, 5 mg, Oral, Nightly PRN, Brady Anna MD, 5 mg at 08/02/22 2108  •  methylPREDNISolone sodium succinate (SOLU-Medrol) injection 40 mg, 40 mg, Intravenous, Q6H, Sofia Hammond APRN, 40 mg at 08/03/22 2145  •  metoprolol succinate XL (TOPROL-XL) 24 hr tablet 25 mg, 25 mg, Oral, Q24H, Brady Anna MD, 25 mg at 08/03/22 1055  •  mirtazapine (REMERON) tablet 15 mg, 15 mg, Oral, Nightly, Brady Anna MD, 15 mg at 08/03/22 2146  •  nitroglycerin (NITROSTAT) SL tablet 0.4 mg, 0.4 mg, Sublingual, Q5 Min PRN, Brady Anna MD  •  ondansetron (ZOFRAN) tablet 4 mg, 4 mg, Oral, Q6H PRN **OR** ondansetron (ZOFRAN) injection 4 mg, 4 mg, Intravenous, Q6H PRN, Brady Anna MD, 4 mg at 08/03/22 0211  •  oxyCODONE (ROXICODONE) immediate release tablet 10 mg, 10 mg, Oral, Q6H PRN, Brady Anna MD, 10 mg at 08/03/22 2157  •  rivaroxaban (XARELTO) tablet 20 mg, 20 mg, Oral, Daily With Dinner, Brady Anna MD, 20 mg at 08/03/22 2145  •  [COMPLETED] Insert peripheral IV, , , Once **AND** sodium chloride 0.9 % flush 10 mL, 10 mL, Intravenous, PRN, Deanna Mondragon, APRN  •  sodium chloride 0.9 % flush 10 mL, 10 mL, Intravenous, Q12H, Brady Anna MD, 10 mL at 08/03/22 2146  •  sodium chloride 0.9 % flush 10 mL, 10 mL, Intravenous, PRN, Brady Anna MD  •  traZODone (DESYREL) tablet 50 mg, 50 mg, Oral, Nightly,  Keven Juarez MD, 50 mg at 08/03/22 1512          Assessment and plan:  Diabetes mellitus type 2 with hyperglycemia: Uncontrolled with significant hyperglycemia, most likely due to steroids and acute sickness.  We will add regular insulin with the steroids and continue glargine 25 units subcu daily along with Humalog before meals as well as Humalog sliding scale we will follow blood sugars and make further adjustments as needed.    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: Followed by hematology/oncology.  Congestive heart failure:    Clinically stable    History of pulmonary embolism: Currently on anticoagulation    Thank you very much for the consultation.      Dalton Garcia MD FACE.

## 2022-08-04 NOTE — PROGRESS NOTES
Patient with 2 walk tests that are normal and does not qualify for oxygen.  Patient gets profoundly short of breath with walking despite normal O2 sat.  Glucose remains elevated endocrinology adjusting insulin.  Will give patient 1 more day inpatient for cefepime and insulin management and discharge home tomorrow without oxygen unfortunately.  Very high risk for rapid readmission unfortunately.

## 2022-08-04 NOTE — TELEPHONE ENCOUNTER
Rx Refill Note  Requested Prescriptions      No prescriptions requested or ordered in this encounter      Last office visit with prescribing clinician: Visit date not found      Next office visit with prescribing clinician: Visit date not found            Shama Olvera MA  08/04/22, 08:00 EDT

## 2022-08-04 NOTE — PROGRESS NOTES
Hematology/Oncology Inpatient Progress Note    PATIENT NAME: Nieves Mc  : 1975  MRN: [id number]    CHIEF COMPLAINT: Shortness of breath    HISTORY OF PRESENT ILLNESS:      Nieves Mc is a 46 y.o. female who presented to [facility] on 2022 with complaints of SOA. Labs showed , Hgb 8.5, Platelets 306, Blasts 26%. Uric Acid 9.0, Alk Phos 875. CT scan of the chest showing extensive groundglass attenuation throughout the lungs is likely related to an inflammatory or atypical infectious process. A component of edema should also be a consideration. Covid pneumonia would be in differential diagnosis as well. Patient was started on antibiotics in the ER.        22  Hematology/Oncology was consulted for established patient with CML presenting with 26% blasts on CBC.  Patient is currently on Hydrea with history of noncompliance with medications. Recent hospitalization 2022 to 2022 for same. At that time hydrea was continued. Last TKI was Tasigna and it was discontinued due to concerns that heart disease was a complication of treatment.   · 2022 echocardiogram done showing EF 41-45%  · 2022 discharge from [facility] on hydroxyurea 500 mg daily  · 2022 proBNP 13,362  · 2022 LDH is high at 1974  · 2022 pRBC transfused for Hgb 6.7     Patient with history of CML, noncompliant with medicines including TKI's.  Patient with multiple hospitalizations.  Was in the hospital discharged on 2022 on hydroxyurea 500 mg daily.     Patient is admitted to the hospital with dyspnea.  CT of the chest shows groundglass opacifications which was secondary to inflammatory or atypical infectious process     She  has a past medical history of Bone pain, COVID-19 virus infection (2022), Diabetes mellitus (HCC), Extremity pain, Leg pain, Migraine, Pulmonary embolism (HCC), and Vision loss.     PCP: Alida Latham APRN    History of present illness was reviewed  "and is unchanged from the previous visit. 07/31/22    Subjective      No changes overnight    ROS:    Review of Systems   Constitutional: Positive for fatigue. Negative for chills and fever.   HENT: Negative for congestion, drooling, ear discharge, rhinorrhea, sinus pressure and tinnitus.    Eyes: Negative for photophobia, pain and discharge.   Respiratory: Negative for apnea, choking and stridor.    Cardiovascular: Negative for palpitations.   Gastrointestinal: Negative for abdominal distention, abdominal pain and anal bleeding.   Endocrine: Negative for polydipsia and polyphagia.   Genitourinary: Negative for decreased urine volume, flank pain and genital sores.   Musculoskeletal: Negative for gait problem, neck pain and neck stiffness.   Skin: Negative for color change, rash and wound.   Neurological: Positive for weakness. Negative for tremors, seizures, syncope, facial asymmetry and speech difficulty.   Hematological: Negative for adenopathy.   Psychiatric/Behavioral: Negative for agitation, confusion, hallucinations and self-injury. The patient is not hyperactive.         MEDICATIONS:    Scheduled Meds:     Continuous Infusions:  No current facility-administered medications for this encounter.     PRN Meds:       ALLERGIES:    Allergies   Allergen Reactions   • Hydromorphone GI Intolerance     dilaudid   • Morphine Nausea And Vomiting   • Propofol Hives     Previously tolerated being premedicated with diphenhydramine and famotadine       Objective    VITALS:   /80 (BP Location: Left arm, Patient Position: Lying)   Pulse 114   Temp 97.9 °F (36.6 °C) (Oral)   Resp 20   Ht 162.6 cm (64\")   Wt 60.6 kg (133 lb 9.6 oz)   SpO2 93%   BMI 22.93 kg/m²     PHYSICAL EXAM:     Physical Exam  Vitals and nursing note reviewed.   Constitutional:       General: She is not in acute distress.     Appearance: She is not diaphoretic.   HENT:      Head: Normocephalic and atraumatic.   Eyes:      General: No scleral " icterus.        Right eye: No discharge.         Left eye: No discharge.      Conjunctiva/sclera: Conjunctivae normal.   Neck:      Thyroid: No thyromegaly.   Cardiovascular:      Rate and Rhythm: Normal rate and regular rhythm.      Heart sounds: Normal heart sounds.     No friction rub. No gallop.   Pulmonary:      Effort: Pulmonary effort is normal. No respiratory distress.      Breath sounds: No stridor. No wheezing.   Abdominal:      General: Bowel sounds are normal.      Palpations: Abdomen is soft. There is no mass.      Tenderness: There is no abdominal tenderness. There is no guarding or rebound.   Musculoskeletal:         General: No tenderness. Normal range of motion.      Cervical back: Normal range of motion and neck supple.   Lymphadenopathy:      Cervical: No cervical adenopathy.   Skin:     General: Skin is warm.      Findings: Bruising present. No erythema or rash.      Comments: On her back   Neurological:      Mental Status: She is alert and oriented to person, place, and time.      Motor: No abnormal muscle tone.   Psychiatric:         Behavior: Behavior normal.       I have reexamined the patient and the results are consistent with the previously documented exam. Meghann Lerma MD     RECENT LABS:    Lab Results (last 24 hours)     Procedure Component Value Units Date/Time    POC Glucose Once [[id number]]  (Abnormal) Collected: 08/05/22 1111    Specimen: Blood Updated: 08/05/22 1112     Glucose 368 mg/dL      Comment: Serial Number: [id number]:  [id number]       POC Glucose Once [[id number]]  (Abnormal) Collected: 08/05/22 0738    Specimen: Blood Updated: 08/05/22 0740     Glucose 338 mg/dL      Comment: Serial Number: [id number]:  [id number]       Manual Differential [[id number]]  (Abnormal) Collected: 08/05/22 0350    Specimen: Blood Updated: 08/05/22 0530     Neutrophil % 56.0 %      Lymphocyte % 1.0 %      Monocyte % 1.0 %      Eosinophil % 4.0 %      Basophil % 11.0 %       Bands %  6.0 %      Metamyelocyte % 6.0 %      Myelocyte % 3.0 %      Blasts % 12.0 %      Neutrophils Absolute 70.25 10*3/mm3      Lymphocytes Absolute 1.13 10*3/mm3      Monocytes Absolute 1.13 10*3/mm3      Eosinophils Absolute 4.53 10*3/mm3      Basophils Absolute 12.46 10*3/mm3      Anisocytosis Slight/1+     Polychromasia Slight/1+     WBC Morphology Normal     Large Platelets Slight/1+    Narrative:      Reviewed by Pathologist within the past 30 days on 07.06.2022.      CBC & Differential [[id number]]  (Abnormal) Collected: 08/05/22 0350    Specimen: Blood Updated: 08/05/22 0530    Narrative:      The following orders were created for panel order CBC & Differential.  Procedure                               Abnormality         Status                     ---------                               -----------         ------                     CBC Auto Differential[[id number]]        Abnormal            Final result               Scan Slide[[id number]]                                       Final result                 Please view results for these tests on the individual orders.    Scan Slide [[id number]] Collected: 08/05/22 0350    Specimen: Blood Updated: 08/05/22 0530     Scan Slide --     Comment: See Manual Differential Results       CBC Auto Differential [[id number]]  (Abnormal) Collected: 08/05/22 0350    Specimen: Blood Updated: 08/05/22 0530     .30 10*3/mm3      RBC 3.45 10*6/mm3      Hemoglobin 8.9 g/dL      Hematocrit 29.9 %      MCV 86.7 fL      MCH 25.7 pg      MCHC 29.7 g/dL      RDW 23.0 %      RDW-SD 68.7 fl      MPV 8.4 fL      Platelets 115 10*3/mm3     Narrative:      The previously reported component NRBC is no longer being reported. Previous result was 0.6 /100 WBC (Reference Range: 0.0-0.2 /100 WBC) on 8/5/2022 at 0450 EDT.    POC Glucose Once [[id number]]  (Abnormal) Collected: 08/05/22 0334    Specimen: Blood Updated: 08/05/22 0335     Glucose 357 mg/dL      Comment: Serial Number:  [id number]:  [id number]       POC Glucose Once [[id number]]  (Abnormal) Collected: 08/05/22 0304    Specimen: Blood Updated: 08/05/22 0305     Glucose 350 mg/dL      Comment: Serial Number: [id number]:  [id number]       POC Glucose Once [[id number]]  (Abnormal) Collected: 08/05/22 0149    Specimen: Blood Updated: 08/05/22 0150     Glucose 410 mg/dL      Comment: Serial Number: [id number]:  [id number]       POC Glucose Once [[id number]]  (Abnormal) Collected: 08/04/22 2140    Specimen: Blood Updated: 08/04/22 2144     Glucose 291 mg/dL      Comment: Serial Number: [id number]:  [id number]       POC Glucose Once [[id number]]  (Abnormal) Collected: 08/04/22 1934    Specimen: Blood Updated: 08/04/22 1935     Glucose 433 mg/dL      Comment: Serial Number: [id number]:  [id number]       POC Glucose Once [[id number]]  (Abnormal) Collected: 08/04/22 1722    Specimen: Blood Updated: 08/04/22 1723     Glucose 443 mg/dL      Comment: Serial Number: [id number]:  [id number]             PENDING RESULTS:     IMAGING REVIEWED:  No radiology results for the last day    Assessment & Plan      ASSESSMENT:    1. CML with hyperleukocytosis :  Has been initiated on IVF, steroids, allopurinol and hydroxyurea has been continued. Hx. of non-compliance with treatment.  Her white count is beginning to drop on hydroxyurea.  WBC decreased to 115.7 today from 135.6 yesterday . Continue to monitor CBC daily. Her leukocytosis is stable.  Blast count is down to 14% to 16%.  She has no evidence of tumor lysis syndrome.  Overall her counts continue to decrease with hydroxyurea.  Continue daily CBCs    2. Significant anemia secondary to malignancy, hydroxyurea.  Patient receiving PRBC's as needed. Hgb 8.1 stable. Daily CBC    3. History of PE: on rivaroxaban.  She will continue the same.  No bleeding issues    4. Acute Resp Failure/Acute on Chronic CHF/Type 2 DM-managed per primary  team    5. Anxiety/Depression    6. Chronic Pain     PLANS:    1. Continue hydroxyurea to 1 gm BID. Continue to look for TKI options for her given her underlying cardiac disease.   2. Consult Palliative Care to discuss goals of care. Patient is still interested in pursuing treatment.  3. Transfuse PRBC's as needed  4. Continue daily cbcs  5. Continue supportive care  6. Discussed with patient  7. After discharge we will see her in the office to discuss options of treatment              Electronically signed by Meghann Lerma MD, 08/05/22, 9:48 PM EDT.

## 2022-08-04 NOTE — PROGRESS NOTES
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)    Depression with anxiety    History of pulmonary embolism    Type 2 diabetes mellitus with diabetic polyneuropathy, with long-term current use of insulin (HCC)    Acute diastolic CHF (congestive heart failure) (HCC)    NICM (nonischemic cardiomyopathy) (HCC)    Dyspnea, unspecified type    Chronic pain    Narcotic dependence (HCC)    Acute respiratory failure with hypoxia (HCC)      Assessment  Recurrent respiratory failure w/hypoxia  CML blast crisis  Dyspnea  Hx tumor lysis syndrome  Hx COVID-19 in January 2022  Chronic pain  Nonischemic cardiomyopathy  Acute diastolic CHF  T2DM  H/O PE  Depression/anxiety     Chronic severe bilateral groundglass opacities noted on CT scans since January 2022 s/p COVID-19     Results for orders placed during the hospital encounter of 06/29/22     Adult Transthoracic Echo Complete W/ Cont if Necessary Per Protocol     Interpretation Summary  · The left ventricular cavity is mildly dilated.  · Left ventricular wall thickness is consistent with mild concentric hypertrophy.  · Left ventricular ejection fraction appears to be 41 - 45%.  · Left ventricular diastolic function is consistent with (grade Ia w/high LAP) impaired relaxation.           Last bronchoscopy 6/6/2022 for immunocompromised and groundglass opacities  - All cultures negative except fungal cultures positive for Candida species which is clinically insignificant  - No significant findings noted     Bone marrow biopsy 7/6/2022   -pending     .00 on admission 7/27/2022-currently 108.60  Blood cultures negative   MRSA and respiratory panel negative        Plan  Follow-up in our office 2 weeks after discharge    6-minute walk done 8/2/2022 and patient passed on room air    Overnight oximetry test:  -Patient did not qualify due to O2 sat did not stay low for 5 minutes or more     Currently oxygenating well on room air.  Continue to  monitor O2 sat  Cefepime  prednisone to 40 mg 3 times daily for 5 days followed by twice daily for 5 days then 40 mg daily until seen outpatient  Mucinex  DuoNebs as needed  Electrolyte/Glycemic control  DVT/GI Prophylaxis-Xarelto hx PE and Protonix  Palliative following    The CT scan of the chest is compatible with severe pneumonitis which has not improved since June , 2022 and appears to have worsened since January 2022 although all the cultures from the bronchoscopy did not point to an infectious etiology        LOS: 7 days     Subjective     Patient resting in bed quietly.  In no distress at this time.  Vital signs stable on monitor.  Patient reports breathing is okay at this moment.  Cough continues.     Objective     Vital signs for last 24 hours:  Vitals:    08/03/22 1633 08/03/22 2100 08/04/22 0126 08/04/22 0545   BP: 140/89 114/79 136/78 126/82   BP Location:  Right arm Right arm Right arm   Patient Position:  Lying Lying Lying   Pulse: 111 (!) 121 108 97   Resp: 20 18 18 18   Temp: 98 °F (36.7 °C) 98.7 °F (37.1 °C) 97.5 °F (36.4 °C) 97.8 °F (36.6 °C)   TempSrc: Oral Oral Oral Oral   SpO2: 98% 100% 97% 100%   Weight:    60.6 kg (133 lb 9.6 oz)   Height:           Intake/Output last 3 shifts:  I/O last 3 completed shifts:  In: 480 [P.O.:480]  Out: -   Intake/Output this shift:  No intake/output data recorded.      Radiology  Imaging Results (Last 24 Hours)       ** No results found for the last 24 hours. **            Labs:  Results from last 7 days   Lab Units 08/04/22 0422   WBC 10*3/mm3 92.60*   HEMOGLOBIN g/dL 8.3*   HEMATOCRIT % 28.5*   PLATELETS 10*3/mm3 95*     Results from last 7 days   Lab Units 08/04/22  0422 08/03/22  0426 08/02/22  0644   SODIUM mmol/L 138   < > 137   POTASSIUM mmol/L 5.2   < > 4.4   CHLORIDE mmol/L 105   < > 101   CO2 mmol/L 22.0   < > 25.0   BUN mg/dL 28*   < > 29*   CREATININE mg/dL 0.50*   < > 0.70   CALCIUM mg/dL 9.5   < > 9.1   BILIRUBIN mg/dL  --   --  1.1   ALK PHOS U/L   --   --  609*   ALT (SGPT) U/L  --   --  35*   AST (SGOT) U/L  --   --  28   GLUCOSE mg/dL 227*   < > 231*    < > = values in this interval not displayed.         Results from last 7 days   Lab Units 08/02/22  0644 08/01/22  0326 07/31/22  0327   ALBUMIN g/dL 3.70 3.50 3.90             Results from last 7 days   Lab Units 08/04/22  0422   MAGNESIUM mg/dL 1.8                   Meds:   SCHEDULE  allopurinol, 300 mg, Oral, Daily  citalopram, 10 mg, Oral, Daily  folic acid, 1 mg, Oral, Daily  gabapentin, 300 mg, Oral, TID  guaiFENesin, 600 mg, Oral, Q12H  hydroxyurea, 1,000 mg, Oral, BID  insulin glargine, 25 Units, Subcutaneous, Nightly  insulin lispro, 0-24 Units, Subcutaneous, 4x Daily With Meals & Nightly  insulin lispro, 5 Units, Subcutaneous, TID With Meals  insulin regular, 16 Units, Subcutaneous, Q6H  losartan, 25 mg, Oral, Q24H  methylPREDNISolone sodium succinate, 40 mg, Intravenous, Q6H  metoprolol succinate XL, 25 mg, Oral, Q24H  mirtazapine, 15 mg, Oral, Nightly  rivaroxaban, 20 mg, Oral, Daily With Dinner  sodium chloride, 10 mL, Intravenous, Q12H  traZODone, 50 mg, Oral, Nightly      Infusions  Pharmacy Consult - Steroid Insulin Protocol,       PRNs    acetaminophen **OR** acetaminophen **OR** acetaminophen    aluminum-magnesium hydroxide-simethicone    dextrose    dextrose    glucagon (human recombinant)    hydrALAZINE    ipratropium-albuterol    melatonin    nitroglycerin    ondansetron **OR** ondansetron    Pharmacy Consult - Steroid Insulin Protocol    [COMPLETED] Insert peripheral IV **AND** sodium chloride    sodium chloride    Physical Exam:  Physical Exam  General Appearance:  Alert. No distress noted.  Thin.  HEENT:  Normocephalic, without obvious abnormality, Conjunctiva/corneas clear,.  Normal external ear canals, Nares normal, no drainage     Neck:  Supple, symmetrical, trachea midline. No JVD.  Lungs /Chest wall: CTA bilaterally, respirations unlabored symmetrical wall movement.     Heart:   Regular rate and rhythm, systolic murmur PMI left sternal border  Abdomen: Soft, non-tender, no masses, no organomegaly.    Extremities: No edema, no clubbing or cyanosis      ROS  Review of Systems  Constitutional: Negative for chills, fever and positive malaise/fatigue.   HENT: Negative.    Eyes: Negative.    Cardiovascular: Negative.    Respiratory: Positive for chronic cough and shortness of breath with exertion, improving.    Skin: Negative.    Musculoskeletal: Tender knot in her back that she believes is from coughing so much  Gastrointestinal: Negative.    Genitourinary: Negative.    Neurological: Negative.    Psychiatric/Behavioral: Negative.            I have reviewed current clinicals.     Electronically signed by JP White, 08/04/22, 9:01 AM EDT.     The NP scribed the note for me, I have examined the patient and reviewed and verified the above findings and and I formulated the assessment and plan as documented

## 2022-08-04 NOTE — PROGRESS NOTES
Daily Progress Note    Patient Care Team:  Alida Latham APRN as PCP - General (Nurse Practitioner)  Meghann Lerma MD as Consulting Physician (Hematology and Oncology)    Chief Complaint: Follow-up type 2 diabetes with hyperglycemia    HPI: Patient seen and examined today.  Blood sugar log reviewed and improving.  Still have high blood sugars.  Started on regular insulin with the steroids along with basal bolus insulin therapy.  She is eating well.    ROS:   Constitutional:  Denies fatigue, tiredness.    Respiratory: denies cough, shortness of breath.   Cardiovascular:  denies chest pain, edema   GI:  Denies abdominal pain, nausea, vomiting.         Vitals:    08/04/22 1039   BP: 114/73   Pulse: 114   Resp: 20   Temp: 97.2 °F (36.2 °C)   SpO2: 96%     Body mass index is 22.93 kg/m².    Physical Exam:  GEN: NAD, conversant  CV: RRR  LUNG: CTA  PSYCH: AOX3, appropriate mood, affect normal      Results Review:     I reviewed the patient's new clinical results.    Glucose   Date Value Ref Range Status   08/04/2022 227 (H) 65 - 99 mg/dL Final     Sodium   Date Value Ref Range Status   08/04/2022 138 136 - 145 mmol/L Final     Potassium   Date Value Ref Range Status   08/04/2022 5.2 3.5 - 5.2 mmol/L Final     CO2   Date Value Ref Range Status   08/04/2022 22.0 22.0 - 29.0 mmol/L Final     Chloride   Date Value Ref Range Status   08/04/2022 105 98 - 107 mmol/L Final     Anion Gap   Date Value Ref Range Status   08/04/2022 11.0 5.0 - 15.0 mmol/L Final     Creatinine   Date Value Ref Range Status   08/04/2022 0.50 (L) 0.57 - 1.00 mg/dL Final     BUN   Date Value Ref Range Status   08/04/2022 28 (H) 6 - 20 mg/dL Final     BUN/Creatinine Ratio   Date Value Ref Range Status   08/04/2022 56.0 (H) 7.0 - 25.0 Final     Calcium   Date Value Ref Range Status   08/04/2022 9.5 8.6 - 10.5 mg/dL Final     Alkaline Phosphatase   Date Value Ref Range Status   08/02/2022 609 (H) 39 - 117 U/L Final     Total  Protein   Date Value Ref Range Status   08/02/2022 6.9 6.0 - 8.5 g/dL Final     ALT (SGPT)   Date Value Ref Range Status   08/02/2022 35 (H) 1 - 33 U/L Final     AST (SGOT)   Date Value Ref Range Status   08/02/2022 28 1 - 32 U/L Final     Total Bilirubin   Date Value Ref Range Status   08/02/2022 1.1 0.0 - 1.2 mg/dL Final     Albumin   Date Value Ref Range Status   08/02/2022 3.70 3.50 - 5.20 g/dL Final     Globulin   Date Value Ref Range Status   08/02/2022 3.2 gm/dL Final     Magnesium   Date Value Ref Range Status   08/04/2022 1.8 1.6 - 2.6 mg/dL Final     Phosphorus   Date Value Ref Range Status   08/04/2022 4.2 2.5 - 4.5 mg/dL Final     Lab Results   Component Value Date    HGBA1C 7.1 (H) 07/28/2022    HGBA1C 8.4 (H) 06/29/2022    HGBA1C 10.2 (H) 06/02/2022     No results found for: GLUF, MICROALBUR  Results from last 7 days   Lab Units 08/04/22  1228 08/04/22  0731 08/03/22  2048 08/03/22  1632 08/03/22  1109 08/03/22  0710   GLUCOSE mg/dL 381* 234* 415* 310* 422* 457*             Medication Review: Reviewed.     allopurinol, 300 mg, Oral, Daily  citalopram, 10 mg, Oral, Daily  folic acid, 1 mg, Oral, Daily  gabapentin, 300 mg, Oral, TID  guaiFENesin, 600 mg, Oral, Q12H  hydroxyurea, 1,000 mg, Oral, BID  insulin glargine, 25 Units, Subcutaneous, Nightly  insulin lispro, 0-24 Units, Subcutaneous, 4x Daily With Meals & Nightly  insulin lispro, 5 Units, Subcutaneous, TID With Meals  insulin regular, 16 Units, Subcutaneous, Q6H  losartan, 25 mg, Oral, Q24H  methylPREDNISolone sodium succinate, 40 mg, Intravenous, Q6H  metoprolol succinate XL, 25 mg, Oral, Q24H  mirtazapine, 15 mg, Oral, Nightly  rivaroxaban, 20 mg, Oral, Daily With Dinner  sodium chloride, 10 mL, Intravenous, Q12H  traZODone, 50 mg, Oral, Nightly              Assessment and plan:  Diabetes mellitus type 2 with hyperglycemia: Uncontrolled with significant hyperglycemia, most likely due to steroids and acute sickness.  Blood sugars improving, will  currently continue regular insulin with the steroids and continue glargine 25 units subcu daily, increase Humalog to 8 units with each meal and continue Humalog sliding scale. Will follow blood sugars and make further adjustments as needed.     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: Followed by hematology/oncology.  Congestive heart failure:     Clinically stable     History of pulmonary embolism: Currently on anticoagulation    Dalton Garcia MD. FACE

## 2022-08-04 NOTE — PROGRESS NOTES
Exercise Oximetry    Patient Name:Nieves Mc   MRN: [id number]   Date: 08/04/22             ROOM AIR BASELINE   SpO2% 97   Heart Rate 114   Blood Pressure      EXERCISE ON ROOM AIR SpO2% EXERCISE ON O2 @ LPM SpO2%   1 MINUTE 94 1 MINUTE    2 MINUTES 93 2 MINUTES    3 MINUTES 92 3 MINUTES    4 MINUTES 91 4 MINUTES    5 MINUTES 93 5 MINUTES    6 MINUTES 96 6 MINUTES               Distance Walked  6 minutes walk Distance Walked   Dyspnea ([place] Scale)   Dyspnea ([place] Scale)   Fatigue ([place] Scale)   Fatigue ([place] Scale)   SpO2% Post Exercise  98 SpO2% Post Exercise   HR Post Exercise  114 HR Post Exercise   Time to Recovery  5 minutes Time to Recovery     Comments: Patient walked in hallway on room air. Patient is extremely short of breath but her sats are 91-96% during the walk.  Thank you

## 2022-08-04 NOTE — CASE MANAGEMENT/SOCIAL WORK
Discharge Planning Assessment  [facility]     Patient Name: Nieves Mc  MRN: [id number]  Today's Date: 8/4/2022    Admit Date: 7/27/2022           Discharge Plan     Row Name 08/04/22 1244       Plan    Plan D/C Plan: Anticipate routine home with family.    Plan Comments  spoke to patient at bedside wearing mask and keeping distance greater than 6 feet and spent less than 15 minutes in room. IMM letter reviewed with patient, verbal consent obtained and declined copy. Anticipate d/c once cleared by heme/onc.                   Expected Discharge Date and Time     Expected Discharge Date Expected Discharge Time    Aug 4, 2022              Patient Forms     Row Name 08/04/22 1244       Patient Forms    Important Message from Medicare (IMM) Delivered  8/4/22    Delivered to Patient    Method of delivery In person  reviewed with patient, verbal consent obtained and declined copy              Met with patient in room wearing PPE: mask.     Maintained distance greater than six feet and spent less than 15 minutes in the room.      DELORES PackN, RN    [address] [address]    Office: [phone]  Fax: [phone]

## 2022-08-05 ENCOUNTER — READMISSION MANAGEMENT (OUTPATIENT)
Dept: CALL CENTER | Facility: HOSPITAL | Age: 47
End: 2022-08-05

## 2022-08-05 VITALS
DIASTOLIC BLOOD PRESSURE: 80 MMHG | HEIGHT: 64 IN | SYSTOLIC BLOOD PRESSURE: 132 MMHG | TEMPERATURE: 97.9 F | OXYGEN SATURATION: 93 % | WEIGHT: 133.6 LBS | BODY MASS INDEX: 22.81 KG/M2 | HEART RATE: 114 BPM | RESPIRATION RATE: 20 BRPM

## 2022-08-05 LAB
ANISOCYTOSIS BLD QL: ABNORMAL
BASOPHILS # BLD MANUAL: 12.46 10*3/MM3 (ref 0–0.2)
BASOPHILS NFR BLD MANUAL: 11 % (ref 0–1.5)
BLASTS NFR BLD MANUAL: 12 % (ref 0–0)
DEPRECATED RDW RBC AUTO: 68.7 FL (ref 37–54)
EOSINOPHIL # BLD MANUAL: 4.53 10*3/MM3 (ref 0–0.4)
EOSINOPHIL NFR BLD MANUAL: 4 % (ref 0.3–6.2)
ERYTHROCYTE [DISTWIDTH] IN BLOOD BY AUTOMATED COUNT: 23 % (ref 12.3–15.4)
GLUCOSE BLDC GLUCOMTR-MCNC: 338 MG/DL (ref 70–105)
GLUCOSE BLDC GLUCOMTR-MCNC: 350 MG/DL (ref 70–105)
GLUCOSE BLDC GLUCOMTR-MCNC: 357 MG/DL (ref 70–105)
GLUCOSE BLDC GLUCOMTR-MCNC: 368 MG/DL (ref 70–105)
GLUCOSE BLDC GLUCOMTR-MCNC: 410 MG/DL (ref 70–105)
HCT VFR BLD AUTO: 29.9 % (ref 34–46.6)
HGB BLD-MCNC: 8.9 G/DL (ref 12–15.9)
LARGE PLATELETS: ABNORMAL
LYMPHOCYTES # BLD MANUAL: 1.13 10*3/MM3 (ref 0.7–3.1)
LYMPHOCYTES NFR BLD MANUAL: 1 % (ref 5–12)
MCH RBC QN AUTO: 25.7 PG (ref 26.6–33)
MCHC RBC AUTO-ENTMCNC: 29.7 G/DL (ref 31.5–35.7)
MCV RBC AUTO: 86.7 FL (ref 79–97)
METAMYELOCYTES NFR BLD MANUAL: 6 % (ref 0–0)
MONOCYTES # BLD: 1.13 10*3/MM3 (ref 0.1–0.9)
MYELOCYTES NFR BLD MANUAL: 3 % (ref 0–0)
NEUTROPHILS # BLD AUTO: 70.25 10*3/MM3 (ref 1.7–7)
NEUTROPHILS NFR BLD MANUAL: 56 % (ref 42.7–76)
NEUTS BAND NFR BLD MANUAL: 6 % (ref 0–5)
PLATELET # BLD AUTO: 115 10*3/MM3 (ref 140–450)
PMV BLD AUTO: 8.4 FL (ref 6–12)
POLYCHROMASIA BLD QL SMEAR: ABNORMAL
QT INTERVAL: 354 MS
RBC # BLD AUTO: 3.45 10*6/MM3 (ref 3.77–5.28)
SCAN SLIDE: NORMAL
VARIANT LYMPHS NFR BLD MANUAL: 1 % (ref 19.6–45.3)
WBC MORPH BLD: NORMAL
WBC NRBC COR # BLD: 113.3 10*3/MM3 (ref 3.4–10.8)

## 2022-08-05 PROCEDURE — 25010000002 METHYLPREDNISOLONE PER 40 MG: Performed by: NURSE PRACTITIONER

## 2022-08-05 PROCEDURE — 94640 AIRWAY INHALATION TREATMENT: CPT

## 2022-08-05 PROCEDURE — 94664 DEMO&/EVAL PT USE INHALER: CPT

## 2022-08-05 PROCEDURE — 63710000001 INSULIN LISPRO (HUMAN) PER 5 UNITS: Performed by: INTERNAL MEDICINE

## 2022-08-05 PROCEDURE — 85025 COMPLETE CBC W/AUTO DIFF WBC: CPT | Performed by: NURSE PRACTITIONER

## 2022-08-05 PROCEDURE — 63710000001 INSULIN REGULAR HUMAN PER 5 UNITS: Performed by: INTERNAL MEDICINE

## 2022-08-05 PROCEDURE — 85007 BL SMEAR W/DIFF WBC COUNT: CPT | Performed by: NURSE PRACTITIONER

## 2022-08-05 PROCEDURE — 82962 GLUCOSE BLOOD TEST: CPT

## 2022-08-05 PROCEDURE — 99239 HOSP IP/OBS DSCHRG MGMT >30: CPT | Performed by: INTERNAL MEDICINE

## 2022-08-05 PROCEDURE — 94761 N-INVAS EAR/PLS OXIMETRY MLT: CPT

## 2022-08-05 PROCEDURE — 99232 SBSQ HOSP IP/OBS MODERATE 35: CPT | Performed by: INTERNAL MEDICINE

## 2022-08-05 PROCEDURE — 94799 UNLISTED PULMONARY SVC/PX: CPT

## 2022-08-05 RX ORDER — METHYLPREDNISOLONE 4 MG/1
1 TABLET ORAL DAILY
Qty: 21 TABLET | Refills: 0 | Status: SHIPPED | OUTPATIENT
Start: 2022-08-05 | End: 2022-08-30

## 2022-08-05 RX ORDER — BUDESONIDE AND FORMOTEROL FUMARATE DIHYDRATE 160; 4.5 UG/1; UG/1
2 AEROSOL RESPIRATORY (INHALATION)
Qty: 10.2 G | Refills: 1 | Status: SHIPPED | OUTPATIENT
Start: 2022-08-05

## 2022-08-05 RX ORDER — BUDESONIDE AND FORMOTEROL FUMARATE DIHYDRATE 160; 4.5 UG/1; UG/1
2 AEROSOL RESPIRATORY (INHALATION)
Status: DISCONTINUED | OUTPATIENT
Start: 2022-08-05 | End: 2022-08-05 | Stop reason: HOSPADM

## 2022-08-05 RX ADMIN — INSULIN LISPRO 16 UNITS: 100 INJECTION, SOLUTION INTRAVENOUS; SUBCUTANEOUS at 08:44

## 2022-08-05 RX ADMIN — INSULIN LISPRO 8 UNITS: 100 INJECTION, SOLUTION INTRAVENOUS; SUBCUTANEOUS at 11:28

## 2022-08-05 RX ADMIN — INSULIN HUMAN 16 UNITS: 100 INJECTION, SOLUTION PARENTERAL at 01:58

## 2022-08-05 RX ADMIN — FOLIC ACID 1 MG: 1 TABLET ORAL at 08:47

## 2022-08-05 RX ADMIN — BUDESONIDE AND FORMOTEROL FUMARATE DIHYDRATE 2 PUFF: 160; 4.5 AEROSOL RESPIRATORY (INHALATION) at 08:12

## 2022-08-05 RX ADMIN — HYDROXYUREA 1000 MG: 500 CAPSULE ORAL at 08:46

## 2022-08-05 RX ADMIN — CITALOPRAM HYDROBROMIDE 10 MG: 20 TABLET ORAL at 08:45

## 2022-08-05 RX ADMIN — METHYLPREDNISOLONE SODIUM SUCCINATE 40 MG: 40 INJECTION, POWDER, FOR SOLUTION INTRAMUSCULAR; INTRAVENOUS at 01:59

## 2022-08-05 RX ADMIN — GUAIFENESIN 600 MG: 600 TABLET, EXTENDED RELEASE ORAL at 08:47

## 2022-08-05 RX ADMIN — GABAPENTIN 300 MG: 300 CAPSULE ORAL at 08:47

## 2022-08-05 RX ADMIN — Medication 10 ML: at 01:59

## 2022-08-05 RX ADMIN — INSULIN LISPRO 8 UNITS: 100 INJECTION, SOLUTION INTRAVENOUS; SUBCUTANEOUS at 08:45

## 2022-08-05 RX ADMIN — ALLOPURINOL 300 MG: 300 TABLET ORAL at 08:47

## 2022-08-05 RX ADMIN — INSULIN LISPRO 20 UNITS: 100 INJECTION, SOLUTION INTRAVENOUS; SUBCUTANEOUS at 11:27

## 2022-08-05 RX ADMIN — LOSARTAN POTASSIUM 25 MG: 25 TABLET, FILM COATED ORAL at 08:46

## 2022-08-05 RX ADMIN — Medication 10 ML: at 08:47

## 2022-08-05 RX ADMIN — OXYCODONE 10 MG: 5 TABLET ORAL at 08:55

## 2022-08-05 RX ADMIN — METHYLPREDNISOLONE SODIUM SUCCINATE 40 MG: 40 INJECTION, POWDER, FOR SOLUTION INTRAMUSCULAR; INTRAVENOUS at 08:46

## 2022-08-05 RX ADMIN — INSULIN HUMAN 16 UNITS: 100 INJECTION, SOLUTION PARENTERAL at 08:44

## 2022-08-05 RX ADMIN — METOPROLOL SUCCINATE 25 MG: 25 TABLET, EXTENDED RELEASE ORAL at 08:47

## 2022-08-05 NOTE — CASE MANAGEMENT/SOCIAL WORK
Case Management Discharge Note      Final Note: Home         Selected Continued Care - Admitted Since 7/27/2022          Transportation Services  Private: Car    Final Discharge Disposition Code: 01 - home or self-care

## 2022-08-05 NOTE — PLAN OF CARE
Goal Outcome Evaluation:  Plan of Care Reviewed With: patient        Progress: no change    Blood sugar has been elevated. On solumedrol. Shortness of breath noted on exertion.

## 2022-08-05 NOTE — DISCHARGE SUMMARY
[facility] Medicine Services  DISCHARGE SUMMARY    Patient Name: Nieves Mc  : 1975  MRN: [id number]    Discharge condition: Stabilized  Date of Admission: 2022  Discharge Diagnosis: Recurrent pneumonitis  Date of Discharge: 2022  Primary Care Physician: Alida Latham APRN      Presenting Problem:   Dyspnea, unspecified type [R06.00]  Chest pain, unspecified type [R07.9]  Pneumonia due to infectious organism, unspecified laterality, unspecified part of lung [J18.9]  Acute congestive heart failure, unspecified heart failure type (HCC) [I50.9]    Active and Resolved Hospital Problems:  Active Hospital Problems    Diagnosis POA   • **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) [C92.10] Yes   • Dyspnea, unspecified type [R06.00] Yes   • Chronic pain [G89.29] Yes   • Narcotic dependence ([facility]) [F11.20] Yes   • Acute respiratory failure with hypoxia ([facility]) [J96.01] Yes   • Acute diastolic CHF (congestive heart failure) ([facility]) [I50.31] Yes   • NICM (nonischemic cardiomyopathy) ([facility]) [I42.8] Yes   • Type 2 diabetes mellitus with diabetic polyneuropathy, with long-term current use of insulin ([facility]) [E11.42, Z79.4] Not Applicable   • History of pulmonary embolism [Z86.711] Yes   • Depression with anxiety [F41.8] Yes      Resolved Hospital Problems   No resolved problems to display.         Hospital Course     Hospital Course:  Nieves Mc is a 46 y.o. female who presents quite frequently with shortness of breath.  The patient has been evaluated several times and noted to have some pneumonitis bilaterally.  She is usually quite tachycardic and short of breath.  She was placed on 2 L.  Pulmonary was consulted.  Patient was also being followed by hematology oncology.  She was initiated on steroids antibiotics and nebulizer treatments.  She was slowly improving.  Once she was stable she was finally discharged home after 2 walk tests not qualifying for oxygen  as well as a sleep study.  Patient was instructed to follow-up with pulmonary very closely.  She was sent home with steroids with improved heart rate.            Reasons For Change In Medications and Indications for New Medications:      Day of Discharge     Vital Signs:  Temp:  [97.3 °F (36.3 °C)-98 °F (36.7 °C)] 97.3 °F (36.3 °C)  Heart Rate:  [105-120] 105  Resp:  [18-21] 20  BP: (133-152)/(71-93) 134/82    Physical Exam:  Physical Exam  Vitals reviewed.   HENT:      Head: Normocephalic.      Nose: Nose normal.      Mouth/Throat:      Mouth: Mucous membranes are moist.   Cardiovascular:      Rate and Rhythm: Regular rhythm. Tachycardia present.      Pulses: Normal pulses.      Heart sounds: Normal heart sounds.   Pulmonary:      Effort: Pulmonary effort is normal.      Breath sounds: Rhonchi present.   Abdominal:      General: Abdomen is flat.      Palpations: Abdomen is soft.   Musculoskeletal:         General: Normal range of motion.      Cervical back: Normal range of motion.   Skin:     General: Skin is warm.   Neurological:      General: No focal deficit present.      Mental Status: She is alert and oriented to person, place, and time.   Psychiatric:         Mood and Affect: Mood normal.         Behavior: Behavior normal.            Pertinent  and/or Most Recent Results     LAB RESULTS:      Lab 08/05/22  0350 08/04/22  0422 08/03/22  0426 08/02/22  0644 08/01/22  0326 07/31/22  0327 07/30/22  0256   .30* 92.60* 112.10* 108.60* 115.70*   < > 132.00*   HEMOGLOBIN 8.9* 8.3* 8.7* 9.0* 8.1*   < > 6.7*   HEMATOCRIT 29.9* 28.5* 29.2* 30.5* 27.2*   < > 23.0*   PLATELETS 115* 95* 95* 104* 121*   < > 160   NEUTROS ABS 70.25* 52.78* 53.81* 48.87* 49.75*   < > 69.96*   EOS ABS 4.53* 1.85* 3.36* 6.52* 6.94*   < > 7.92*   MCV 86.7 85.8 84.8 84.9 83.7   < > 82.0   SED RATE  --  13  --  21*  --   --   --    CRP  --  <0.30  --  0.44  --   --   --    PROCALCITONIN  --   --   --   --   --   --  0.21    < > = values in  this interval not displayed.         Lab 08/04/22  0422 08/03/22  1301 08/03/22  0426 08/02/22  0644 08/01/22  0326 07/31/22 0327 07/30/22  0256   SODIUM 138  --  133* 137 136 139 137   POTASSIUM 5.2 4.4 5.7* 4.4 4.5 4.1 3.6   CHLORIDE 105  --  99 101 100 101 100   CO2 22.0  --  24.0 25.0 24.0 26.0 27.0   ANION GAP 11.0  --  10.0 11.0 12.0 12.0 10.0   BUN 28*  --  27* 29* 26* 25* 26*   CREATININE 0.50*  --  0.57 0.70 0.54* 0.60 0.56*   EGFR 117.3  --  113.7 108.2 115.2 112.3 114.1   GLUCOSE 227*  --  424* 231* 285* 261* 307*   CALCIUM 9.5  --  9.2 9.1 8.8 9.0 9.0   MAGNESIUM 1.8  --  2.2 2.1  --  2.1 2.0   PHOSPHORUS 4.2  --  4.4 4.7*  --  2.8 2.8         Lab 08/02/22  0644 08/01/22  0326 07/31/22 0327 07/30/22  0256   TOTAL PROTEIN 6.9 6.7 7.0 6.7   ALBUMIN 3.70 3.50 3.90 3.60   GLOBULIN 3.2 3.2 3.1 3.1   ALT (SGPT) 35* 27 34* 20   AST (SGOT) 28 20 29 12   BILIRUBIN 1.1 1.1 1.4* 0.8   ALK PHOS 609* 546* 689* 644*                 Lab 07/30/22  0434   ABO TYPING A   RH TYPING Positive   ANTIBODY SCREEN Positive         Brief Urine Lab Results  (Last result in the past 365 days)      Color   Clarity   Blood   Leuk Est   Nitrite   Protein   CREAT   Urine HCG        07/05/22 1620 Yellow   Clear   Large (3+)   Trace   Negative   Negative               Microbiology Results (last 10 days)     Procedure Component Value - Date/Time    S. Pneumo Ag Urine or CSF - Urine, Urine, Clean Catch [[id number]]  (Normal) Collected: 07/29/22 1527    Lab Status: Final result Specimen: Urine, Clean Catch Updated: 07/29/22 1556     Strep Pneumo Ag Negative    Legionella Antigen, Urine - Urine, Urine, Clean Catch [[id number]]  (Normal) Collected: 07/29/22 1527    Lab Status: Final result Specimen: Urine, Clean Catch Updated: 07/29/22 1556     LEGIONELLA ANTIGEN, URINE Negative    MRSA Screen, PCR (Inpatient) - Swab, Nares [[id number]]  (Normal) Collected: 07/28/22 1528    Lab Status: Final result Specimen: Swab from Nares Updated: 07/28/22  1651     MRSA PCR No MRSA Detected    Narrative:      The negative predictive value of this diagnostic test is high and should only be used to consider de-escalating anti-MRSA therapy. A positive result may indicate colonization with MRSA and must be correlated clinically.    Blood Culture - Blood, Arm, Right [[id number]]  (Normal) Collected: 07/27/22 2304    Lab Status: Final result Specimen: Blood from Arm, Right Updated: 08/01/22 2317     Blood Culture No growth at 5 days    Blood Culture - Blood, Arm, Left [[id number]]  (Normal) Collected: 07/27/22 2203    Lab Status: Final result Specimen: Blood from Arm, Left Updated: 08/01/22 2218     Blood Culture No growth at 5 days     Gram Stain --     Comment: The previously reported stain <null> is no longer being reported.       Respiratory Panel PCR w/COVID-19(SARS-CoV-2) [facility]E In-House, NP Swab in UTM/VTM, 3-4 HR TAT - Swab, Nasopharynx [[id number]]  (Normal) Collected: 07/27/22 2142    Lab Status: Final result Specimen: Swab from Nasopharynx Updated: 07/27/22 2236     ADENOVIRUS, PCR Not Detected     Coronavirus 229E Not Detected     Coronavirus HKU1 Not Detected     Coronavirus NL63 Not Detected     Coronavirus OC43 Not Detected     COVID19 Not Detected     Human Metapneumovirus Not Detected     Human Rhinovirus/Enterovirus Not Detected     Influenza A PCR Not Detected     Influenza B PCR Not Detected     Parainfluenza Virus 1 Not Detected     Parainfluenza Virus 2 Not Detected     Parainfluenza Virus 3 Not Detected     Parainfluenza Virus 4 Not Detected     RSV, PCR Not Detected     Bordetella pertussis pcr Not Detected     Bordetella parapertussis PCR Not Detected     Chlamydophila pneumoniae PCR Not Detected     Mycoplasma pneumo by PCR Not Detected    Narrative:      In the setting of a positive respiratory panel with a viral infection PLUS a negative procalcitonin without other underlying concern for bacterial infection, consider observing  off antibiotics or discontinuation of antibiotics and continue supportive care. If the respiratory panel is positive for atypical bacterial infection (Bordetella pertussis, Chlamydophila pneumoniae, or Mycoplasma pneumoniae), consider antibiotic de-escalation to target atypical bacterial infection.          XR Chest 1 View    Result Date: 7/27/2022  Impression: Perihilar and diffuse interstitial and airspace opacities likely related to mild to moderate pulmonary edema pattern. A suspected small bilateral pleural effusions are present. There is cardiomegaly. Superimposed pneumonia cannot be excluded.  Electronically Signed By-Nivia Bhakta MD On:7/27/2022 9:31 PM This report was finalized on [id number] by  Nivia Bhakta MD.    XR Chest 1 View    Result Date: 7/7/2022  Impression: Persistent mixed interstitial and alveolar opacities which may relate to pulmonary edema, not significantly changed from most recent comparison.  Electronically Signed By-Zac Turcios MD On:7/7/2022 7:20 AM This report was finalized on [id number] by  Zac Turcios MD.    CT Angiogram Chest Pulmonary Embolism    Result Date: 7/28/2022  Impression: 1. No evidence of pulmonary embolism. 2. No evidence of thoracic aortic aneurysm or dissection. 3. Extensive groundglass attenuation throughout the lungs is likely related to an inflammatory or atypical infectious process. A component of edema should also be a consideration. Covid pneumonia would be in differential diagnosis as well. 4. Small bilateral pleural effusions. 5. Cardiomegaly. 6. Marked hepatosplenomegaly however only minimally included on the evaluation. Electronically signed by:  Juan Colón D.O.  7/27/2022 10:17 PM    CT Bone marrow biopsy and aspiration    Result Date: 7/6/2022  Impression: Technically successful CT-guided left posterior iliac 11-gauge bone marrow aspiration and core biopsy.  Electronically Signed By-Mary Corado MD On:7/6/2022 3:27 PM This report was  finalized on [id number] by  Mary Corado MD.      Results for orders placed during the hospital encounter of 03/11/21    Duplex Venous Lower Extremity - Left    Interpretation Summary  · Normal left lower extremity venous duplex scan.      Results for orders placed during the hospital encounter of 03/11/21    Duplex Venous Lower Extremity - Left    Interpretation Summary  · Normal left lower extremity venous duplex scan.      Results for orders placed during the hospital encounter of 06/29/22    Adult Transthoracic Echo Complete W/ Cont if Necessary Per Protocol    Interpretation Summary  · The left ventricular cavity is mildly dilated.  · Left ventricular wall thickness is consistent with mild concentric hypertrophy.  · Left ventricular ejection fraction appears to be 41 - 45%.  · Left ventricular diastolic function is consistent with (grade Ia w/high LAP) impaired relaxation.      Labs Pending at Discharge:      Procedures Performed           Consults:   Consults     Date and Time Order Name Status Description    8/3/2022  9:23 AM Inpatient Endocrinology Consult      8/1/2022 10:02 AM Inpatient Pulmonology Consult Completed     7/28/2022 10:19 AM Inpatient Palliative Care MD Consult Completed     7/28/2022  4:13 AM Hematology & Oncology Inpatient Consult Completed     7/27/2022 10:30 PM Hematology and Oncology (on-call MD unless specified)      7/3/2022  6:03 PM Inpatient Pulmonology Consult Completed             Discharge Details        Discharge Medications      New Medications      Instructions Start Date   budesonide-formoterol 160-4.5 MCG/ACT inhaler  Commonly known as: SYMBICORT   2 puffs, Inhalation, 2 Times Daily - RT      methylPREDNISolone 4 MG dose pack  Commonly known as: MEDROL   4 mg, Oral, Daily, Take as directed on package instructions.         Continue These Medications      Instructions Start Date   allopurinol 100 MG tablet  Commonly known as: ZYLOPRIM   100 mg, Oral, Daily      ALPRAZolam  0.25 MG tablet  Commonly known as: Xanax   0.25 mg, Oral, Nightly PRN      BASAGLAR KWIKPEN 100 UNIT/ML injection pen   20 Units, Subcutaneous, Daily      citalopram 10 MG tablet  Commonly known as: CeleXA   10 mg, Oral, Daily, To begin 1/31/22      folic acid 1 MG tablet  Commonly known as: FOLVITE   1 mg, Oral, Daily      hydroxyurea 500 MG capsule  Commonly known as: HYDREA   500 mg, Oral, Daily      Insulin Lispro 100 UNIT/ML injection pen  Commonly known as: ADMELOG SOLOSTAR   6 Units, Subcutaneous, 3 Times Daily      losartan 25 MG tablet  Commonly known as: COZAAR   25 mg, Oral, Every 24 Hours Scheduled      metoprolol succinate XL 25 MG 24 hr tablet  Commonly known as: TOPROL-XL   25 mg, Oral, Daily      Mucus Relief 600 MG 12 hr tablet  Generic drug: guaiFENesin   600 mg, Oral, Every 12 Hours Scheduled      oxyCODONE-acetaminophen  MG per tablet  Commonly known as: PERCOCET   1 tablet, Oral, Every 8 Hours PRN      rivaroxaban 20 MG tablet  Commonly known as: XARELTO   20 mg, Oral, Daily      traZODone 50 MG tablet  Commonly known as: DESYREL   50 mg, Oral, Nightly         Stop These Medications    nilotinib 150 MG capsule capsule  Commonly known as: TASIGNA            Allergies   Allergen Reactions   • Hydromorphone GI Intolerance     dilaudid   • Morphine Nausea And Vomiting   • Propofol Hives     Previously tolerated being premedicated with diphenhydramine and famotadine         Discharge Disposition:   Home or Self Care    Diet:  Hospital:  Diet Order   Procedures   • Diet Diabetic/Consistent Carbs; Diabetic - Consistent Carb         Discharge Activity:         CODE STATUS:  Code Status and Medical Interventions:   Ordered at: 07/28/22 1792     Level Of Support Discussed With:    Patient     Code Status (Patient has no pulse and is not breathing):    CPR (Attempt to Resuscitate)     Medical Interventions (Patient has pulse or is breathing):    Full Support         Future Appointments   Date Time  Provider Department Center   8/25/2022  3:00 PM Meghann Lerma MD [facility]   8/25/2022  3:00 PM LAB MD [facility] LAB NA [facility]   8/25/2022  3:00 PM PHARMACY EDUCATION NA  [facility] ONAP None   10/19/2022  3:30 PM Kishore Chatterjee MD [facility] PAIN  [facility]       Additional Instructions for the Follow-ups that You Need to Schedule     Discharge Follow-up with Specialty: pulmonary; 1 Week   As directed      Specialty: pulmonary    Follow Up: 1 Week               Time spent on Discharge including face to face service:  45 minutes    This patient has been examined wearing appropriate Personal Protective Equipment and discussed with Patient. 08/05/22      Signature: Villa Patel MD

## 2022-08-05 NOTE — CASE MANAGEMENT/SOCIAL WORK
Continued Stay Note  [facility]     Patient Name: Nieves Mc  MRN: [id number]  Today's Date: 8/5/2022    Admit Date: 7/27/2022     Discharge Plan     Row Name 08/05/22 1042       Plan    Plan D/C Plan: Anticipate routine home with family.    Plan Comments Anticipate d/c once cleared by primary. Did not qualify for home O2 on 8/4. Pulmonary to follow OP.                    Expected Discharge Date and Time     Expected Discharge Date Expected Discharge Time    Aug 5, 2022         Phone communication or documentation only - no physical contact with patient or family.    DELORES PackN, RN    [place] [id number]    Office: [phone]  Fax: [phone]

## 2022-08-05 NOTE — PLAN OF CARE
Problem: Pain Acute  Goal: Acceptable Pain Control and Functional Ability  8/5/2022 1345 by Mamta Cheatham RN  Outcome: Met  8/5/2022 1344 by Mamta Cheatham RN  Outcome: Ongoing, Progressing  Intervention: Prevent or Manage Pain  Recent Flowsheet Documentation  Taken 8/5/2022 1200 by Mamta Cheatham RN  Medication Review/Management: medications reviewed  Taken 8/5/2022 1000 by Mamta Cheatham RN  Medication Review/Management: medications reviewed  Taken 8/5/2022 0800 by Mamta Cheatham RN  Medication Review/Management: medications reviewed  Intervention: Optimize Psychosocial Wellbeing  Recent Flowsheet Documentation  Taken 8/5/2022 1200 by Mamta Cheatham RN  Diversional Activities: television     Problem: Adult Inpatient Plan of Care  Goal: Plan of Care Review  8/5/2022 1345 by Mamta Cheatham RN  Outcome: Met  8/5/2022 1344 by Mamta Cheatham RN  Outcome: Ongoing, Progressing  Goal: Patient-Specific Goal (Individualized)  8/5/2022 1345 by Mamta Cheatham RN  Outcome: Met  8/5/2022 1344 by Mamta Cheatham RN  Outcome: Ongoing, Progressing  Goal: Absence of Hospital-Acquired Illness or Injury  8/5/2022 1345 by Mamta Cheatham RN  Outcome: Met  8/5/2022 1344 by Mamta Cheatham RN  Outcome: Ongoing, Progressing  Intervention: Identify and Manage Fall Risk  Recent Flowsheet Documentation  Taken 8/5/2022 1200 by Mamta Cheatham RN  Safety Promotion/Fall Prevention:   assistive device/personal items within reach   clutter free environment maintained   safety round/check completed   nonskid shoes/slippers when out of bed  Taken 8/5/2022 1000 by Mamta Cheatham RN  Safety Promotion/Fall Prevention:   activity supervised   assistive device/personal items within reach   clutter free environment maintained   safety round/check completed   nonskid shoes/slippers when out of bed  Taken 8/5/2022 0800 by Mamta Cheatham RN  Safety Promotion/Fall Prevention:   activity supervised   lighting adjusted   safety round/check completed  Intervention: Prevent  Skin Injury  Recent Flowsheet Documentation  Taken 8/5/2022 1200 by Mamta Cheatham RN  Body Position: position changed independently  Skin Protection:   incontinence pads utilized   tubing/devices free from skin contact  Taken 8/5/2022 0800 by Mamta Cheatham RN  Body Position: position changed independently  Skin Protection:   incontinence pads utilized   tubing/devices free from skin contact  Intervention: Prevent Infection  Recent Flowsheet Documentation  Taken 8/5/2022 1200 by Mamta Cheatham RN  Infection Prevention:   cohorting utilized   single patient room provided  Taken 8/5/2022 1000 by Mamta Cheatham RN  Infection Prevention:   cohorting utilized   single patient room provided  Taken 8/5/2022 0800 by Mamta Cheatham RN  Infection Prevention:   cohorting utilized   single patient room provided  Goal: Optimal Comfort and Wellbeing  8/5/2022 1345 by Mamta Cheatham RN  Outcome: Met  8/5/2022 1344 by Mamta Cheatham RN  Outcome: Ongoing, Progressing  Intervention: Provide Person-Centered Care  Recent Flowsheet Documentation  Taken 8/5/2022 1200 by Mamta Cheatham RN  Trust Relationship/Rapport: care explained  Taken 8/5/2022 0800 by Mamta Cheatham RN  Trust Relationship/Rapport:   care explained   questions answered   questions encouraged   thoughts/feelings acknowledged  Goal: Readiness for Transition of Care  8/5/2022 1345 by Mamta Cheatham RN  Outcome: Met  8/5/2022 1344 by Mamta Cheatham RN  Outcome: Ongoing, Progressing     Problem: Skin Injury Risk Increased  Goal: Skin Health and Integrity  8/5/2022 1345 by Mamta Cheatham RN  Outcome: Met  8/5/2022 1344 by Mamta Cheatham RN  Outcome: Ongoing, Progressing  Intervention: Optimize Skin Protection  Recent Flowsheet Documentation  Taken 8/5/2022 1200 by Mamta Cheatham RN  Pressure Reduction Techniques: frequent weight shift encouraged  Pressure Reduction Devices: positioning supports utilized  Skin Protection:   incontinence pads utilized   tubing/devices free from skin  contact  Taken 8/5/2022 0800 by Mamta Cheatham, RN  Pressure Reduction Techniques:   frequent weight shift encouraged   heels elevated off bed  Pressure Reduction Devices: positioning supports utilized  Skin Protection:   incontinence pads utilized   tubing/devices free from skin contact   Goal Outcome Evaluation:         Pt to d/c with family. Pt's mother is coming to pick her up at 1630

## 2022-08-05 NOTE — PROGRESS NOTES
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)    Depression with anxiety    History of pulmonary embolism    Type 2 diabetes mellitus with diabetic polyneuropathy, with long-term current use of insulin (HCC)    Acute diastolic CHF (congestive heart failure) (HCC)    NICM (nonischemic cardiomyopathy) (HCC)    Dyspnea, unspecified type    Chronic pain    Narcotic dependence (HCC)    Acute respiratory failure with hypoxia (HCC)      Assessment  Recurrent respiratory failure w/hypoxia  CML blast crisis  Dyspnea  Hx tumor lysis syndrome  Hx COVID-19 in January 2022  Chronic pain  Nonischemic cardiomyopathy  Acute diastolic CHF  T2DM  H/O PE  Depression/anxiety     Chronic severe bilateral groundglass opacities noted on CT scans since January 2022 s/p COVID-19     Results for orders placed during the hospital encounter of 06/29/22     Adult Transthoracic Echo Complete W/ Cont if Necessary Per Protocol     Interpretation Summary  · The left ventricular cavity is mildly dilated.  · Left ventricular wall thickness is consistent with mild concentric hypertrophy.  · Left ventricular ejection fraction appears to be 41 - 45%.  · Left ventricular diastolic function is consistent with (grade Ia w/high LAP) impaired relaxation.           Last bronchoscopy 6/6/2022 for immunocompromised and groundglass opacities  - All cultures negative except fungal cultures positive for Candida species which is clinically insignificant  - No significant findings noted     Bone marrow biopsy 7/6/2022   -pending     .00 on admission 7/27/2022-currently 108.60  Blood cultures negative   MRSA and respiratory panel negative        Plan  Plan is to possibly discharge home today.  Starting Symbicort inhaler to trial for continuous shortness of breath.  Can assess her response to that at follow-up appointment.  Follow-up in our office 2-3 weeks after discharge     Currently oxygenating well on room air.  Continue  to monitor O2 sat  Solu-Medrol: Could be changed to oral prednisone upon discharge  Mucinex  DuoNebs as needed  Electrolyte/Glycemic control  DVT/GI Prophylaxis-Xarelto hx PE and Protonix  Palliative following    The CT scan of the chest is compatible with severe pneumonitis which has not improved since June , 2022 and appears to have worsened since January 2022 although all the cultures from the bronchoscopy did not point to an infectious etiology        LOS: 8 days     Subjective     Patient resting in bed quietly.  In no distress at this time.  Vital signs stable on monitor.  Patient reports breathing is okay at this moment.  Cough continues.     Objective     Vital signs for last 24 hours:  Vitals:    08/04/22 1755 08/04/22 2200 08/05/22 0314 08/05/22 0500   BP: 143/93 133/71 134/75 137/89   BP Location: Left arm  Left arm Left arm   Patient Position: Lying  Lying    Pulse: 120 112 110 110   Resp: 21 20 20 20   Temp: 97.6 °F (36.4 °C) 98 °F (36.7 °C) 97.8 °F (36.6 °C) 97.6 °F (36.4 °C)   TempSrc: Oral Oral Oral Oral   SpO2: 99% 92% 94% 93%   Weight:       Height:           Intake/Output last 3 shifts:  I/O last 3 completed shifts:  In: 840 [P.O.:840]  Out: -   Intake/Output this shift:  No intake/output data recorded.      Radiology  Imaging Results (Last 24 Hours)       ** No results found for the last 24 hours. **            Labs:  Results from last 7 days   Lab Units 08/05/22  0350   WBC 10*3/mm3 113.30*   HEMOGLOBIN g/dL 8.9*   HEMATOCRIT % 29.9*   PLATELETS 10*3/mm3 115*     Results from last 7 days   Lab Units 08/04/22  0422 08/03/22  0426 08/02/22  0644   SODIUM mmol/L 138   < > 137   POTASSIUM mmol/L 5.2   < > 4.4   CHLORIDE mmol/L 105   < > 101   CO2 mmol/L 22.0   < > 25.0   BUN mg/dL 28*   < > 29*   CREATININE mg/dL 0.50*   < > 0.70   CALCIUM mg/dL 9.5   < > 9.1   BILIRUBIN mg/dL  --   --  1.1   ALK PHOS U/L  --   --  609*   ALT (SGPT) U/L  --   --  35*   AST (SGOT) U/L  --   --  28   GLUCOSE mg/dL  227*   < > 231*    < > = values in this interval not displayed.         Results from last 7 days   Lab Units 08/02/22  0644 08/01/22  0326 07/31/22  0327   ALBUMIN g/dL 3.70 3.50 3.90             Results from last 7 days   Lab Units 08/04/22  0422   MAGNESIUM mg/dL 1.8                   Meds:   SCHEDULE  allopurinol, 300 mg, Oral, Daily  budesonide-formoterol, 2 puff, Inhalation, BID - RT  citalopram, 10 mg, Oral, Daily  folic acid, 1 mg, Oral, Daily  gabapentin, 300 mg, Oral, TID  guaiFENesin, 600 mg, Oral, Q12H  hydroxyurea, 1,000 mg, Oral, BID  insulin glargine, 25 Units, Subcutaneous, Nightly  insulin lispro, 0-24 Units, Subcutaneous, 4x Daily With Meals & Nightly  insulin lispro, 8 Units, Subcutaneous, TID With Meals  insulin regular, 16 Units, Subcutaneous, Q6H  losartan, 25 mg, Oral, Q24H  methylPREDNISolone sodium succinate, 40 mg, Intravenous, Q6H  metoprolol succinate XL, 25 mg, Oral, Q24H  mirtazapine, 15 mg, Oral, Nightly  rivaroxaban, 20 mg, Oral, Daily With Dinner  sodium chloride, 10 mL, Intravenous, Q12H  traZODone, 50 mg, Oral, Nightly      Infusions  Pharmacy Consult - Steroid Insulin Protocol,       PRNs    acetaminophen **OR** acetaminophen **OR** acetaminophen    ALPRAZolam    aluminum-magnesium hydroxide-simethicone    dextrose    dextrose    glucagon (human recombinant)    hydrALAZINE    HYDROcodone-acetaminophen    ipratropium-albuterol    melatonin    nitroglycerin    ondansetron **OR** ondansetron    oxyCODONE    Pharmacy Consult - Steroid Insulin Protocol    [COMPLETED] Insert peripheral IV **AND** sodium chloride    sodium chloride    Physical Exam:  Physical Exam  General Appearance:  Alert. No distress noted.  Thin.  HEENT:  Normocephalic, without obvious abnormality, Conjunctiva/corneas clear,.  Normal external ear canals, Nares normal, no drainage     Neck:  Supple, symmetrical, trachea midline. No JVD.  Lungs /Chest wall: CTA bilaterally, respirations unlabored symmetrical wall  movement.     Heart:  Regular rate and rhythm, systolic murmur PMI left sternal border  Abdomen: Soft, non-tender, no masses, no organomegaly.    Extremities: No edema, no clubbing or cyanosis      ROS  Review of Systems  Constitutional: Negative for chills, fever and positive malaise/fatigue.   HENT: Negative.    Eyes: Negative.    Cardiovascular: Negative.    Respiratory: Positive for chronic cough and shortness of breath with exertion, improving.    Skin: Negative.    Musculoskeletal: Tender knot in her back that she believes is from coughing so much  Gastrointestinal: Negative.    Genitourinary: Negative.    Neurological: Negative.    Psychiatric/Behavioral: Negative.            I have reviewed current clinicals.     Electronically signed by JP White, 08/05/22, 7:19 AM EDT.     The NP scribed the note for me, I have examined the patient and reviewed and verified the above findings and and I formulated the assessment and plan as documented

## 2022-08-06 NOTE — PROGRESS NOTES
Hematology/Oncology Inpatient Progress Note    PATIENT NAME: Nieves Mc  : 1975  MRN: [id number]    CHIEF COMPLAINT: Shortness of breath    HISTORY OF PRESENT ILLNESS:      Nieves Mc is a 46 y.o. female who presented to [facility] on 2022 with complaints of SOA. Labs showed , Hgb 8.5, Platelets 306, Blasts 26%. Uric Acid 9.0, Alk Phos 875. CT scan of the chest showing extensive groundglass attenuation throughout the lungs is likely related to an inflammatory or atypical infectious process. A component of edema should also be a consideration. Covid pneumonia would be in differential diagnosis as well. Patient was started on antibiotics in the ER.        22  Hematology/Oncology was consulted for established patient with CML presenting with 26% blasts on CBC.  Patient is currently on Hydrea with history of noncompliance with medications. Recent hospitalization 2022 to 2022 for same. At that time hydrea was continued. Last TKI was Tasigna and it was discontinued due to concerns that heart disease was a complication of treatment.   · 2022 echocardiogram done showing EF 41-45%  · 2022 discharge from [facility] on hydroxyurea 500 mg daily  · 2022 proBNP 13,362  · 2022 LDH is high at 1974  · 2022 pRBC transfused for Hgb 6.7     Patient with history of CML, noncompliant with medicines including TKI's.  Patient with multiple hospitalizations.  Was in the hospital discharged on 2022 on hydroxyurea 500 mg daily.     Patient is admitted to the hospital with dyspnea.  CT of the chest shows groundglass opacifications which was secondary to inflammatory or atypical infectious process     She  has a past medical history of Bone pain, COVID-19 virus infection (2022), Diabetes mellitus (HCC), Extremity pain, Leg pain, Migraine, Pulmonary embolism (HCC), and Vision loss.     PCP: Alida Latham APRN    History of present illness was reviewed  "and is unchanged from the previous visit. 07/31/22    Subjective      Patien has no new issues    ROS:    Review of Systems   Constitutional: Positive for fatigue. Negative for chills and fever.   HENT: Negative for congestion, drooling, ear discharge, rhinorrhea, sinus pressure and tinnitus.    Eyes: Negative for photophobia, pain and discharge.   Respiratory: Negative for apnea, choking and stridor.    Cardiovascular: Negative for palpitations.   Gastrointestinal: Negative for abdominal distention, abdominal pain and anal bleeding.   Endocrine: Negative for polydipsia and polyphagia.   Genitourinary: Negative for decreased urine volume, flank pain and genital sores.   Musculoskeletal: Negative for gait problem, neck pain and neck stiffness.   Skin: Negative for color change, rash and wound.   Neurological: Positive for weakness. Negative for tremors, seizures, syncope, facial asymmetry and speech difficulty.   Hematological: Negative for adenopathy.   Psychiatric/Behavioral: Negative for agitation, confusion, hallucinations and self-injury. The patient is not hyperactive.         MEDICATIONS:    Scheduled Meds:     Continuous Infusions:  No current facility-administered medications for this encounter.     PRN Meds:       ALLERGIES:    Allergies   Allergen Reactions   • Hydromorphone GI Intolerance     dilaudid   • Morphine Nausea And Vomiting   • Propofol Hives     Previously tolerated being premedicated with diphenhydramine and famotadine       Objective    VITALS:   /80 (BP Location: Left arm, Patient Position: Lying)   Pulse 114   Temp 97.9 °F (36.6 °C) (Oral)   Resp 20   Ht 162.6 cm (64\")   Wt 60.6 kg (133 lb 9.6 oz)   SpO2 93%   BMI 22.93 kg/m²     PHYSICAL EXAM:     Physical Exam  Vitals and nursing note reviewed.   Constitutional:       General: She is not in acute distress.     Appearance: She is not diaphoretic.   HENT:      Head: Normocephalic and atraumatic.   Eyes:      General: No scleral " icterus.        Right eye: No discharge.         Left eye: No discharge.      Conjunctiva/sclera: Conjunctivae normal.   Neck:      Thyroid: No thyromegaly.   Cardiovascular:      Rate and Rhythm: Normal rate and regular rhythm.      Heart sounds: Normal heart sounds.     No friction rub. No gallop.   Pulmonary:      Effort: Pulmonary effort is normal. No respiratory distress.      Breath sounds: No stridor. No wheezing.   Abdominal:      General: Bowel sounds are normal.      Palpations: Abdomen is soft. There is no mass.      Tenderness: There is no abdominal tenderness. There is no guarding or rebound.   Musculoskeletal:         General: No tenderness. Normal range of motion.      Cervical back: Normal range of motion and neck supple.   Lymphadenopathy:      Cervical: No cervical adenopathy.   Skin:     General: Skin is warm.      Findings: Bruising present. No erythema or rash.      Comments: On her back   Neurological:      Mental Status: She is alert and oriented to person, place, and time.      Motor: No abnormal muscle tone.   Psychiatric:         Behavior: Behavior normal.       I have reexamined the patient and the results are consistent with the previously documented exam. Meghann Lerma MD     RECENT LABS:    Lab Results (last 24 hours)     Procedure Component Value Units Date/Time    POC Glucose Once [[id number]]  (Abnormal) Collected: 08/05/22 1111    Specimen: Blood Updated: 08/05/22 1112     Glucose 368 mg/dL      Comment: Serial Number: [id number]:  [id number]       POC Glucose Once [[id number]]  (Abnormal) Collected: 08/05/22 0738    Specimen: Blood Updated: 08/05/22 0740     Glucose 338 mg/dL      Comment: Serial Number: [id number]:  [id number]       Manual Differential [[id number]]  (Abnormal) Collected: 08/05/22 0350    Specimen: Blood Updated: 08/05/22 0530     Neutrophil % 56.0 %      Lymphocyte % 1.0 %      Monocyte % 1.0 %      Eosinophil % 4.0 %      Basophil % 11.0 %       Bands %  6.0 %      Metamyelocyte % 6.0 %      Myelocyte % 3.0 %      Blasts % 12.0 %      Neutrophils Absolute 70.25 10*3/mm3      Lymphocytes Absolute 1.13 10*3/mm3      Monocytes Absolute 1.13 10*3/mm3      Eosinophils Absolute 4.53 10*3/mm3      Basophils Absolute 12.46 10*3/mm3      Anisocytosis Slight/1+     Polychromasia Slight/1+     WBC Morphology Normal     Large Platelets Slight/1+    Narrative:      Reviewed by Pathologist within the past 30 days on 07.06.2022.      CBC & Differential [[id number]]  (Abnormal) Collected: 08/05/22 0350    Specimen: Blood Updated: 08/05/22 0530    Narrative:      The following orders were created for panel order CBC & Differential.  Procedure                               Abnormality         Status                     ---------                               -----------         ------                     CBC Auto Differential[[id number]]        Abnormal            Final result               Scan Slide[[id number]]                                       Final result                 Please view results for these tests on the individual orders.    Scan Slide [[id number]] Collected: 08/05/22 0350    Specimen: Blood Updated: 08/05/22 0530     Scan Slide --     Comment: See Manual Differential Results       CBC Auto Differential [[id number]]  (Abnormal) Collected: 08/05/22 0350    Specimen: Blood Updated: 08/05/22 0530     .30 10*3/mm3      RBC 3.45 10*6/mm3      Hemoglobin 8.9 g/dL      Hematocrit 29.9 %      MCV 86.7 fL      MCH 25.7 pg      MCHC 29.7 g/dL      RDW 23.0 %      RDW-SD 68.7 fl      MPV 8.4 fL      Platelets 115 10*3/mm3     Narrative:      The previously reported component NRBC is no longer being reported. Previous result was 0.6 /100 WBC (Reference Range: 0.0-0.2 /100 WBC) on 8/5/2022 at 0450 EDT.    POC Glucose Once [[id number]]  (Abnormal) Collected: 08/05/22 0334    Specimen: Blood Updated: 08/05/22 0335     Glucose 357 mg/dL      Comment: Serial Number:  [id number]:  [id number]       POC Glucose Once [[id number]]  (Abnormal) Collected: 08/05/22 0304    Specimen: Blood Updated: 08/05/22 0305     Glucose 350 mg/dL      Comment: Serial Number: [id number]:  [id number]       POC Glucose Once [[id number]]  (Abnormal) Collected: 08/05/22 0149    Specimen: Blood Updated: 08/05/22 0150     Glucose 410 mg/dL      Comment: Serial Number: [id number]:  [id number]       POC Glucose Once [[id number]]  (Abnormal) Collected: 08/04/22 2140    Specimen: Blood Updated: 08/04/22 2144     Glucose 291 mg/dL      Comment: Serial Number: [id number]:  [id number]       POC Glucose Once [[id number]]  (Abnormal) Collected: 08/04/22 1934    Specimen: Blood Updated: 08/04/22 1935     Glucose 433 mg/dL      Comment: Serial Number: [id number]:  [id number]       POC Glucose Once [[id number]]  (Abnormal) Collected: 08/04/22 1722    Specimen: Blood Updated: 08/04/22 1723     Glucose 443 mg/dL      Comment: Serial Number: [id number]:  [id number]             PENDING RESULTS:     IMAGING REVIEWED:  No radiology results for the last day    Assessment & Plan      ASSESSMENT:    1. CML with hyperleukocytosis :  Has been initiated on IVF, steroids, allopurinol and hydroxyurea has been continued. Hx. of non-compliance with treatment.  Her white count is beginning to drop on hydroxyurea.  WBC decreased to 115.7 today from 135.6 yesterday . Continue to monitor CBC daily. Her leukocytosis is stable.  Blast count is down to 14% to 16%.  She has no evidence of tumor lysis syndrome.  Overall her counts continue to decrease with hydroxyurea.  Continue to monitor daily CBCs    2. Significant anemia secondary to malignancy, hydroxyurea.  Patient receiving PRBC's as needed. Hgb 8.1 stable. Daily CBC    3. History of PE: on rivaroxaban.  She will continue the same.    4. Acute Resp Failure/Acute on Chronic CHF/Type 2 DM-managed per primary team    5. Anxiety/Depression    6. Chronic  Pain     PLANS:    1. Continue hydroxyurea to 1 gm BID. Continue to look for TKI options for her given her underlying cardiac disease.   2. Consult Palliative Care to discuss goals of care. Patient is still interested in pursuing treatment.  3. Transfuse PRBC's as needed  4. Continue daily cbcs  5. Continue supportive care  6. Discussed with patient  7. After discharge we will see her in the office to discuss options of treatment  8. Will continue to follow             Electronically signed by Meghann Lerma MD, 08/05/22, 9:56 PM EDT.

## 2022-08-06 NOTE — OUTREACH NOTE
Prep Survey    Flowsheet Row Responses   [facility] facility patient discharged from? [facility]   Is LACE score < 7 ? No   Emergency Room discharge w/ pulse ox? No   Eligibility Readm Mgmt   Discharge diagnosis blast crisis phase of chronic myeloid leukemia   Does the patient have one of the following disease processes/diagnoses(primary or secondary)? Other   Does the patient have Home health ordered? No   Is there a DME ordered? No   Prep survey completed? Yes          HELIO JALLOH - Registered Nurse

## 2022-08-08 ENCOUNTER — TELEPHONE (OUTPATIENT)
Dept: ONCOLOGY | Facility: CLINIC | Age: 47
End: 2022-08-08

## 2022-08-08 NOTE — TELEPHONE ENCOUNTER
Hub is instructed to read the documentation below to patient  ATTEMPTED TO CONTACT PATIENT'S MOTHER REGARDING MD F/U APPT. NO ANSWER.  LMV ASKING SAMUEL TO CALL BACK.

## 2022-08-08 NOTE — PROGRESS NOTES
Hematology/Oncology Outpatient Follow Up    PATIENT NAME:Nieves Mc  :1975  MRN: [id number]  PRIMARY CARE PHYSICIAN: Alida Latham APRN  REFERRING PHYSICIAN: Alida Latham, *    Chief Complaint   Patient presents with   • Follow-up     Leukemia not having achieved remission, unspecified leukemia type (HCC)        HISTORY OF PRESENT ILLNESS:      Patient is a 46 y.o. female with PMH significant for CML on treatment with Imatinib.  Patient stated that she typically feels poorly with Imatinib with frequent nausea and vomiting.  Also complained of weakness and fatigue.  Patient presented to ED on 10/22/18 with complaints of an elevated blood sugar around 400.  Stated she felt poorly and has had a productive cough with yellow sputum.  Complained of nausea and stated she was unable to keep any food down anytime she ate.  The patient reported subjective fever without chills.  She stated she had been coughing so hard that she was vomiting.  She denied hematemesis.  Denied diarrhea, constipation or blood in her stool.       Patient’s chest x-ray showed no evidence of acute pulmonary embolus.  The patient has ground-glass opacities throughout the lungs.  There is some air trapping noted which would appear to be   infectious.  Patient was started on antibiotics.      Hem/Onc was consulted on 10/23/18 for co-management of patient with CML.  Patient was seen by Dr. Lerma on 10/12 on previous admission for patient reported CML on Imatinib (Gleevec).  Patient reports she is under the care of Dr. Roach at [facility] in [place].  Patient was seen by Dr. Lerma in May 2018 when patient presented with hematuria, which was attributed to her Imatinib.  Dr. Lerma followed during hospitalization but then patient returned to Dr. Roach for her usual follow up.  She was again seen on 10/12.  Patient is stating she will switch to Dr. Lerma.    · Review of Dr. Roach’s note:  On 18 patient  had BCR-abl which was 61.326.  Patient had been on Imatinib 400 mg daily.  She had presented in March 2018 with WBC of 24, hemoglobin 13.1, platelet count of 1,067,000.  Patient apparently had follow up BCR-abl in August 2018, results are not available for review.  Her bone marrow aspiration and biopsy was also performed at that time is currently not available for review.    · 11/6/18 - .  Uric acid 6.5.  BCR-abl by RT-PCR was measured at 3.36%.    · Due to thrombocytosis, patient was placed on Hydroxyurea 500 mg twice a day on 12/11/18.  Platelet count juana to 900,000.  Patient was noncompliant with CBCs that were recommended.    Patient was asked to return to the office after not being able to reach her.   · 12/27/18 - Bone marrow aspiration and biopsy was consistent with chronic myeloid leukemia.  BCR-abl positive, chronic phase.  ABL kinase mutational analysis is currently pending on the bone marrow.    · 1/3/19 - Repeat CBC, WBC 17, hemoglobin 11.9, platelet count 1,072,000.  Hydroxyurea was increased to 1 gm twice a day with follow up CBC.    · 1/6/19 - Follow up CBC showed progressive increase in platelet to 1.1 million.  Patient was offered admission to the hospital, which she declined.  Hydroxyurea was increased to 1 gm three   times a day as of 1/6/19.   · 1/7/19 - EKG shows sinus tachycardia.   milliseconds.   ·  ABL kinase on peripheral blood was ordered on 1/11/19.  Based on sequence analysis, there was no mutation detected.    · 2/12/19 - Patient was initiated on Tasigna 300 mg twice a day.  · 2/13/19 - Urinalysis was negative for hematuria.   · 2/22/19 -  milliseconds.  · 3/13/19 - EKG with QTC is 413 milliseconds.  Nonspecific changes noted.    · 4/18/19 - EKG showed QTC prolonged to 453 milliseconds.  This is changed from prior.  · 4/18/19 - Free T4 normal at 0.91.  Magnesium was 1.7.  BUN is 6.  Creatinine 0.7.  Bilirubin 2.2.  Phosphorous normal 3.7.  TSH 0.43, normal.  Free  T3 was normal at 3.47.  Ionized calcium   normal at 1.23.  BCR-abl was 0.522%.    · 19 - EKG showed QTC of 441 milliseconds.  QT was 366.     · Patient has been lost to follow-up since 2019 for CML.  Patient states that she lost her insurance.  She also does not have any primary care physician at this time.  She is diabetic on insulin.  · Patient was recently admitted to the hospital for pneumonia due to COVID-19 infection.  At that time patient made a decision to become compliant with treatment.  She is currently on to Cigna 300 mg p.o. twice a day.  She is also on hydroxyurea 1 g twice a day.  Overall she feels better, she has more energy.  · 3/1/2022 white count is 53.92, hemoglobin 11.7 and platelets are 472    · 2022: Patient has not been seen since 2022.  Also verified that she has not been taking to Tasigna since 2022.  She comes in today with cough for 1 and half months, shortness of breath, denies fevers.  · 2022 patient had 2D echocardiogram which showed an EF of 41 to 45%.  Left ventricular cavity is mildly dilated.  She was diagnosed with nonischemic cardiomyopathy  Past Medical History:   Diagnosis Date   • Bone pain    • COVID-19 virus infection 2022   • Diabetes mellitus (HCC)    • Extremity pain     Carlin. legs pain   • Leg pain     left leg greater   • Migraine    • Pulmonary embolism (HCC)    • Vision loss     doing surgery       Past Surgical History:   Procedure Laterality Date   • BONE MARROW BIOPSY     • BREAST SURGERY     • BRONCHOSCOPY N/A 2022    Procedure: BRONCHOSCOPY bilateral lung washing;  Surgeon: Charlene Camara MD;  Location: [facility] ENDOSCOPY;  Service: Pulmonary;  Laterality: N/A;  post: rule out infection vs transfusion lung injury   •  SECTION     • CHOLECYSTECTOMY     • EYE SURGERY      laser surgery due  to hemmorage--- 2021-- another surgery  lt eye11/15/21   • RETINAL DETACHMENT SURGERY     • SPINE SURGERY      Lombardi spinal block    • TUBAL ABDOMINAL LIGATION           Current Outpatient Medications:   •  allopurinol (ZYLOPRIM) 100 MG tablet, Take 1 tablet by mouth Daily., Disp: 30 tablet, Rfl: 0  •  ALPRAZolam (Xanax) 0.25 MG tablet, Take 1 tablet by mouth At Night As Needed for Anxiety., Disp: 30 tablet, Rfl: 0  •  budesonide-formoterol (SYMBICORT) 160-4.5 MCG/ACT inhaler, Inhale 2 puffs 2 (Two) Times a Day., Disp: 10.2 g, Rfl: 1  •  citalopram (CeleXA) 10 MG tablet, TAKE 1 TABLET BY MOUTH DAILY. TO BEGIN 1/31/22, Disp: 30 tablet, Rfl: 1  •  folic acid (FOLVITE) 1 MG tablet, Take 1 tablet by mouth Daily., Disp: 30 tablet, Rfl: 0  •  guaiFENesin (MUCINEX) 600 MG 12 hr tablet, Take 1 tablet by mouth Every 12 (Twelve) Hours., Disp: 30 tablet, Rfl: 0  •  hydroxyurea (HYDREA) 500 MG capsule, Take 1 capsule by mouth Daily., Disp: 60 capsule, Rfl: 0  •  Insulin Glargine (BASAGLAR KWIKPEN) 100 UNIT/ML injection pen, Inject 20 Units under the skin into the appropriate area as directed Daily., Disp: 10 mL, Rfl: 3  •  Insulin Lispro (ADMELOG SOLOSTAR) 100 UNIT/ML injection pen, Inject 6 Units under the skin into the appropriate area as directed 3 (Three) Times a Day., Disp: 3 mL, Rfl: 3  •  losartan (COZAAR) 25 MG tablet, Take 1 tablet by mouth Daily., Disp: 30 tablet, Rfl: 0  •  methylPREDNISolone (MEDROL) 4 MG dose pack, Take as directed per package instruction, Disp: 21 tablet, Rfl: 0  •  metoprolol succinate XL (TOPROL-XL) 25 MG 24 hr tablet, Take 25 mg by mouth Daily., Disp: , Rfl:   •  oxyCODONE-acetaminophen (PERCOCET)  MG per tablet, Take 1 tablet by mouth Every 8 (Eight) Hours As Needed for Moderate Pain ., Disp: , Rfl:   •  rivaroxaban (XARELTO) 20 MG tablet, Take 1 tablet by mouth Daily., Disp: 90 tablet, Rfl: 0  •  traZODone (DESYREL) 50 MG tablet, Take 50 mg by mouth Every Night., Disp: , Rfl:   No current facility-administered medications for this visit.    Allergies   Allergen Reactions   • Hydromorphone GI Intolerance      "dilaudid   • Morphine Nausea And Vomiting   • Propofol Hives     Previously tolerated being premedicated with diphenhydramine and famotadine       Family History   Problem Relation Age of Onset   • Diabetes Mother    • Diabetes Maternal Grandmother    • Heart attack Maternal Grandmother    • Stroke Maternal Grandmother        Cancer-related family history is not on file.    Social History     Tobacco Use   • Smoking status: Never Smoker   • Smokeless tobacco: Never Used   Vaping Use   • Vaping Use: Never used   Substance Use Topics   • Alcohol use: No   • Drug use: No       HPI, ROS and PFSH have been reviewed and confirmed on 8/9/2022.     SUBJECTIVE:    Patient was recently discharged from the hospital on hydroxyurea 1 g twice a day        REVIEW OF SYSTEMS:  Review of Systems   Constitutional: Negative for chills and fever.   HENT: Negative for ear pain, mouth sores, nosebleeds and sore throat.    Eyes: Negative for photophobia and visual disturbance.   Respiratory: Negative for wheezing and stridor.    Cardiovascular: Negative for chest pain and palpitations.   Gastrointestinal: Negative for abdominal pain, diarrhea, nausea and vomiting.   Endocrine: Negative for cold intolerance and heat intolerance.   Genitourinary: Negative for dysuria and hematuria.   Musculoskeletal: Negative for joint swelling and neck stiffness.   Skin: Negative for color change and rash.   Neurological: Negative for seizures and syncope.   Hematological: Negative for adenopathy.        No obvious bleeding   Psychiatric/Behavioral: Negative for agitation, confusion and hallucinations.       OBJECTIVE:    Vitals:    08/09/22 1523   BP: 153/92   Pulse: 111   Temp: 96.9 °F (36.1 °C)   SpO2: 98%   Weight: 56.7 kg (125 lb)  Comment: verbal   Height: 162.6 cm (64\")   PainSc: 0-No pain     Body mass index is 21.46 kg/m².    ECOG  (1) Restricted in physically strenuous activity, ambulatory and able to do work of light nature    Physical " Exam  Vitals and nursing note reviewed.   Constitutional:       General: She is not in acute distress.     Appearance: She is not diaphoretic.   HENT:      Head: Normocephalic and atraumatic.   Eyes:      General: No scleral icterus.        Right eye: No discharge.         Left eye: No discharge.      Conjunctiva/sclera: Conjunctivae normal.   Neck:      Thyroid: No thyromegaly.   Cardiovascular:      Rate and Rhythm: Normal rate and regular rhythm.      Heart sounds: Normal heart sounds. No friction rub. No gallop.    Pulmonary:      Effort: Pulmonary effort is normal. No respiratory distress.      Breath sounds: No stridor. No wheezing.   Abdominal:      General: Bowel sounds are normal.      Palpations: Abdomen is soft. There is no mass.      Tenderness: There is no abdominal tenderness. There is no guarding or rebound.   Musculoskeletal:         General: No tenderness. Normal range of motion.      Cervical back: Normal range of motion and neck supple.   Lymphadenopathy:      Cervical: No cervical adenopathy.   Skin:     General: Skin is warm.      Findings: No erythema or rash.   Neurological:      Mental Status: She is alert and oriented to person, place, and time.      Motor: No abnormal muscle tone.   Psychiatric:         Behavior: Behavior normal.     I have reexamined the patient and the results are consistent with the previously documented exam. Meghann Lerma MD     RECENT LABS    WBC   Date Value Ref Range Status   08/09/2022 104.00 (C) 3.40 - 10.80 10*3/mm3 Final     RBC   Date Value Ref Range Status   08/09/2022 3.42 (L) 3.77 - 5.28 10*6/mm3 Final     Hemoglobin   Date Value Ref Range Status   08/09/2022 9.5 (L) 12.0 - 15.9 g/dL Final     Hematocrit   Date Value Ref Range Status   08/09/2022 31.6 (L) 34.0 - 46.6 % Final     MCV   Date Value Ref Range Status   08/09/2022 92.4 79.0 - 97.0 fL Final     MCH   Date Value Ref Range Status   08/09/2022 27.8 26.6 - 33.0 pg Final     MCHC   Date Value  Ref Range Status   08/09/2022 30.1 (L) 31.5 - 35.7 g/dL Final     RDW   Date Value Ref Range Status   08/09/2022 26.2 (H) 12.3 - 15.4 % Final     RDW-SD   Date Value Ref Range Status   08/09/2022 82.9 (H) 37.0 - 54.0 fl Final     MPV   Date Value Ref Range Status   08/09/2022 10.0 6.0 - 12.0 fL Final     Platelets   Date Value Ref Range Status   08/09/2022 127 (L) 140 - 450 10*3/mm3 Final     Neutrophil %   Date Value Ref Range Status   10/16/2019 60.0 42.7 - 76.0 % Final     Lymphocyte %   Date Value Ref Range Status   10/16/2019 32.1 19.6 - 45.3 % Final     Monocyte %   Date Value Ref Range Status   01/28/2022 7.5 5.0 - 12.0 % Final     Eosinophil %   Date Value Ref Range Status   08/09/2022 5.7 0.3 - 6.2 % Final     Basophil %   Date Value Ref Range Status   10/16/2019 1.3 0.0 - 1.5 % Final     Neutrophils Absolute   Date Value Ref Range Status   08/05/2022 70.25 (H) 1.70 - 7.00 10*3/mm3 Final     Neutrophils, Absolute   Date Value Ref Range Status   10/16/2019 5.60 1.70 - 7.00 10*3/mm3 Final     Lymphocytes, Absolute   Date Value Ref Range Status   10/16/2019 3.00 0.70 - 3.10 10*3/mm3 Final     Monocytes, Absolute   Date Value Ref Range Status   01/28/2022 5.33 (H) 0.10 - 0.90 10*3/mm3 Final     Eosinophils, Absolute   Date Value Ref Range Status   08/09/2022 5.93 (H) 0.00 - 0.40 10*3/mm3 Final     Basophils Absolute   Date Value Ref Range Status   08/05/2022 12.46 (H) 0.00 - 0.20 10*3/mm3 Final     Basophils, Absolute   Date Value Ref Range Status   10/16/2019 0.10 0.00 - 0.20 10*3/mm3 Final     nRBC   Date Value Ref Range Status   08/04/2022 2.0 (H) 0.0 - 0.2 /100 WBC Final   10/16/2019 0.2 0.0 - 0.2 /100 WBC Final       Lab Results   Component Value Date    GLUCOSE 227 (H) 08/04/2022    BUN 28 (H) 08/04/2022    CREATININE 0.50 (L) 08/04/2022    EGFRIFNONA 133 02/02/2022    BCR 56.0 (H) 08/04/2022    K 5.2 08/04/2022    CO2 22.0 08/04/2022    CALCIUM 9.5 08/04/2022    ALBUMIN 3.70 08/02/2022    LABIL2 1.0  04/18/2019    AST 28 08/02/2022    ALT 35 (H) 08/02/2022           ASSESSMENT    · CML not in remission  · Noncompliant with TKI's for her CML  · Recent cardiomyopathy diagnosis with EF around 45%, nonischemic.  Follows up with Dr. Reynolds with cardiology services  · CML in chronic phase with no significant hematologic or molecular or cytogenetic response on   Imatinib.  ABL kinase mutational analysis was negative.  We  have represcribed to Tasigna 300 mg twice a day.  Patient has been noncompliant with treatments and ended up in the hospital with hyperleukocytosis, peripheral blood blasts.  Bone marrow biopsy suggest that she remains in chronic phase.  Continue to Tasiigna at the current doses.  Hydroxyurea has been discontinued.  · Uncontrolled diabetes type 1, followed at the [facility] by Dr. Calles  · Chronic anemia: Stable, multifactorial from CML, to Tasigna, hydroxyurea.  This has improved  · Insomnia  · Situational anxiety, not suicidal, Zoloft is not helping  · Hyperleukocytosis secondary to CML  · History of medical noncompliance  · Pulmonary embolism: Xarelto restarted  · Recent COVID-19 pneumonia  · History of prolonged QTC        Plans    · IV Zofran and IV fluids today due to nausea  · Continue hydroxyurea 1 g twice a day  · We will get pharmacist involved to see which TKI may be most appropriate for her given her cardiomyopathy  · Weekly CBC and CMP for now  · Weaned off Elavil since anxiety not improved  · Discontinued Celexa 10 mg p.o. every morning due to prolonged QTC.  · Reviewed her EKG completed on 3/28/2022  · Continue Xanax 0.25 mg p.o. nightly as needed number 30 pills.  Will refill today.  · Continue hydroxyurea 1 g twice a day for now  · Follow-up with me in 4 weeks or earlier as needed  · Monitor CBC closely  · All questions answered         I spent 40 total minutes, face-to-face, caring for Hope today.  90% of this time involved counseling and/or coordination of care as  documented within this note.      .

## 2022-08-08 NOTE — TELEPHONE ENCOUNTER
Hub is instructed to read the documentation below to patient    RECEIVED MESSAGE FROM PATIENT STATING SHE BROKE HER ANKLE AND SHE NEEDS TO RESCHEDULE F/U APPT.  NO ANSWER. LMV ASKING PATIENT TO CALL BACK.

## 2022-08-09 ENCOUNTER — LAB (OUTPATIENT)
Dept: LAB | Facility: HOSPITAL | Age: 47
End: 2022-08-09

## 2022-08-09 ENCOUNTER — SPECIALTY PHARMACY (OUTPATIENT)
Dept: PHARMACY | Facility: HOSPITAL | Age: 47
End: 2022-08-09

## 2022-08-09 ENCOUNTER — HOSPITAL ENCOUNTER (OUTPATIENT)
Dept: ONCOLOGY | Facility: HOSPITAL | Age: 47
Setting detail: INFUSION SERIES
Discharge: HOME | End: 2022-08-09

## 2022-08-09 ENCOUNTER — OFFICE VISIT (OUTPATIENT)
Dept: ONCOLOGY | Facility: CLINIC | Age: 47
End: 2022-08-09

## 2022-08-09 ENCOUNTER — READMISSION MANAGEMENT (OUTPATIENT)
Dept: CALL CENTER | Facility: HOSPITAL | Age: 47
End: 2022-08-09

## 2022-08-09 VITALS
HEART RATE: 111 BPM | WEIGHT: 125 LBS | HEIGHT: 64 IN | OXYGEN SATURATION: 98 % | DIASTOLIC BLOOD PRESSURE: 92 MMHG | SYSTOLIC BLOOD PRESSURE: 153 MMHG | BODY MASS INDEX: 21.34 KG/M2 | TEMPERATURE: 96.9 F

## 2022-08-09 DIAGNOSIS — C95.90 LEUKEMIA NOT HAVING ACHIEVED REMISSION, UNSPECIFIED LEUKEMIA TYPE: Primary | ICD-10-CM

## 2022-08-09 DIAGNOSIS — C91.10 CLL (CHRONIC LYMPHOCYTIC LEUKEMIA): ICD-10-CM

## 2022-08-09 DIAGNOSIS — E86.0 DEHYDRATION: Primary | ICD-10-CM

## 2022-08-09 DIAGNOSIS — C95.90 LEUKEMIA NOT HAVING ACHIEVED REMISSION, UNSPECIFIED LEUKEMIA TYPE: ICD-10-CM

## 2022-08-09 DIAGNOSIS — C92.10 CML (CHRONIC MYELOCYTIC LEUKEMIA): ICD-10-CM

## 2022-08-09 LAB
BASOPHILS NFR BLD AUTO: ABNORMAL %
DEPRECATED RDW RBC AUTO: 82.9 FL (ref 37–54)
EOSINOPHIL # BLD AUTO: 5.93 10*3/MM3 (ref 0–0.4)
EOSINOPHIL NFR BLD AUTO: 5.7 % (ref 0.3–6.2)
ERYTHROCYTE [DISTWIDTH] IN BLOOD BY AUTOMATED COUNT: 26.2 % (ref 12.3–15.4)
HCT VFR BLD AUTO: 31.6 % (ref 34–46.6)
HGB BLD-MCNC: 9.5 G/DL (ref 12–15.9)
LYMPHOCYTES NFR BLD AUTO: ABNORMAL %
MCH RBC QN AUTO: 27.8 PG (ref 26.6–33)
MCHC RBC AUTO-ENTMCNC: 30.1 G/DL (ref 31.5–35.7)
MCV RBC AUTO: 92.4 FL (ref 79–97)
MONOCYTES NFR BLD AUTO: ABNORMAL %
NEUTROPHILS NFR BLD AUTO: ABNORMAL %
PLATELET # BLD AUTO: 127 10*3/MM3 (ref 140–450)
PMV BLD AUTO: 10 FL (ref 6–12)
RBC # BLD AUTO: 3.42 10*6/MM3 (ref 3.77–5.28)
WBC NRBC COR # BLD: 104 10*3/MM3 (ref 3.4–10.8)

## 2022-08-09 PROCEDURE — 99215 OFFICE O/P EST HI 40 MIN: CPT | Performed by: INTERNAL MEDICINE

## 2022-08-09 PROCEDURE — 85025 COMPLETE CBC W/AUTO DIFF WBC: CPT

## 2022-08-09 PROCEDURE — 96375 TX/PRO/DX INJ NEW DRUG ADDON: CPT

## 2022-08-09 PROCEDURE — 96360 HYDRATION IV INFUSION INIT: CPT

## 2022-08-09 PROCEDURE — 96374 THER/PROPH/DIAG INJ IV PUSH: CPT

## 2022-08-09 PROCEDURE — 25010000002 ONDANSETRON PER 1 MG: Performed by: INTERNAL MEDICINE

## 2022-08-09 PROCEDURE — 96361 HYDRATE IV INFUSION ADD-ON: CPT

## 2022-08-09 RX ORDER — ONDANSETRON 2 MG/ML
8 INJECTION INTRAMUSCULAR; INTRAVENOUS ONCE
Status: COMPLETED | OUTPATIENT
Start: 2022-08-09 | End: 2022-08-09

## 2022-08-09 RX ADMIN — SODIUM CHLORIDE 500 ML: 9 INJECTION, SOLUTION INTRAVENOUS at 15:56

## 2022-08-09 RX ADMIN — ONDANSETRON 8 MG: 2 INJECTION, SOLUTION INTRAMUSCULAR; INTRAVENOUS at 16:04

## 2022-08-09 NOTE — PROGRESS NOTES
Pt. Here at clinic for MD follow up.   Pt. Was brought to the infusion area for IVF's and IV zofran due to her nausea.   IV access obtained, no labs drawn in the infusion area.   IVF's given as ordered, and pt. Tolerated treatment well.   Pt. Discharged from clinic and AVS was given.

## 2022-08-09 NOTE — OUTREACH NOTE
Medical Week 1 Survey    Flowsheet Row Responses   [facility] patient discharged from? [facility]   Does the patient have one of the following disease processes/diagnoses(primary or secondary)? Other   Week 1 attempt successful? Yes   Call start time 0926   Call end time 0931   Discharge diagnosis blast crisis phase of chronic myeloid leukemia   Person spoke with today (if not patient) and relationship Sayra Cabezas    Meds reviewed with patient/caregiver? Yes   Is the patient having any side effects they believe may be caused by any medication additions or changes? No   Does the patient have all medications ordered at discharge? Yes   Is the patient taking all medications as directed (includes completed medication regime)? Yes   Comments regarding appointments appt today with oncologist, 8/9/22   Does the patient have a primary care provider?  Yes   Does the patient have an appointment with their PCP within 7 days of discharge? No   What is preventing the patient from scheduling follow up appointments within 7 days of discharge? Haven't had time   Nursing Interventions Advised patient to make appointment   Has the patient kept scheduled appointments due by today? N/A   Has home health visited the patient within 72 hours of discharge? N/A   Psychosocial issues? No   Did the patient receive a copy of their discharge instructions? Yes   Nursing interventions Reviewed instructions with patient   What is the patient's perception of their health status since discharge? Improving   Is the patient/caregiver able to teach back signs and symptoms related to disease process for when to call PCP? Yes   Is the patient/caregiver able to teach back signs and symptoms related to disease process for when to call 911? Yes   Is the patient/caregiver able to teach back the hierarchy of who to call/visit for symptoms/problems? PCP, Specialist, Home health nurse, Urgent Care, ED, 911 Yes   Additional teach back comments  mother states breathing WNL's, plans to discuss CA treatments with oncologist today at appt   Week 1 call completed? Yes          CARMEN JALLOH - Registered Nurse

## 2022-08-10 NOTE — PROGRESS NOTES
[facility] Encounter-Re: Adherence and side effects (Hydroxyurea and Tasigna)    Today's encounter was conducted in person, face-to-face.     Medication:  Hydroxyurea 500 mg daily   (Pt no longer taking Tasisgna due to cardiomyopathy)   - Reason for outreach: Routine medication check-in .  - Administration: as prescribed (hydroxyurea only).  - Missed doses: Patient reports missing 0 doses in the last 30 days.(of hydroxyurea only)  - Self-administration: Patient demonstrates ability to self-administer medication. No barriers to adherence identified.   - Diagnosis/Indication: CML. Progress toward achieving therapeutic goals reviewed.   - Patient denies side effects. (hydroxyurea only)  - Medication availability/affordability: Patient has had no issues obtaining medication from pharmacy.   - Questions/concerns about medications: Pt was prescribed Tasigna. She stopped taking in February per refill history and said she refuses to take due to medication causing heart issues. Contacted provider to suggest possibly retrying pt on imatinib as this TKI carries least incidence of cardiovascular SE.        Completed medication reconciliation today to assess for drug interactions.   Reviewed allergies, medical history, labs, quality of life, and medication history with patient.   Patient medication list was not reviewed with pt at this time; She was curently experiencing N/V when visiting with pt. Pt was also receiving IV fluids at time of visit Assessed medication list for interactions, Not reviewed at this time  Advised pt to call the clinic if any new medications are started so we can assess for drug-drug interactions.     All questions addressed. Patient had no additional concerns for [facility] office.     Amy Aldridge RPH  8/10/2022  10:14 EDT

## 2022-08-11 DIAGNOSIS — C92.10 CML (CHRONIC MYELOCYTIC LEUKEMIA): ICD-10-CM

## 2022-08-11 DIAGNOSIS — C95.90 LEUKEMIA NOT HAVING ACHIEVED REMISSION, UNSPECIFIED LEUKEMIA TYPE: ICD-10-CM

## 2022-08-11 RX ORDER — ALPRAZOLAM 0.25 MG/1
0.25 TABLET ORAL NIGHTLY PRN
Qty: 30 TABLET | Refills: 0 | Status: SHIPPED | OUTPATIENT
Start: 2022-08-11 | End: 2022-09-22 | Stop reason: SDUPTHER

## 2022-08-11 NOTE — TELEPHONE ENCOUNTER
Caller: Sayra Cabezas    Relationship: Mother    Best call back number: [phone]    Requested Prescriptions:   Requested Prescriptions     Pending Prescriptions Disp Refills   • ALPRAZolam (Xanax) 0.25 MG tablet 30 tablet 0     Sig: Take 1 tablet by mouth At Night As Needed for Anxiety.        Pharmacy where request should be sent: [facility] - [place], [place] - [address]  - [phone]  - [phone] FX     Additional details provided by patient: PT IS OUT OF MEDICATION    Does the patient have less than a 3 day supply:  [x] Yes  [] No

## 2022-08-16 ENCOUNTER — LAB (OUTPATIENT)
Dept: LAB | Facility: HOSPITAL | Age: 47
End: 2022-08-16

## 2022-08-16 ENCOUNTER — HOSPITAL ENCOUNTER (OUTPATIENT)
Dept: ONCOLOGY | Facility: HOSPITAL | Age: 47
Setting detail: INFUSION SERIES
Discharge: HOME | End: 2022-08-16

## 2022-08-16 VITALS
WEIGHT: 131.1 LBS | RESPIRATION RATE: 18 BRPM | BODY MASS INDEX: 22.38 KG/M2 | DIASTOLIC BLOOD PRESSURE: 80 MMHG | HEIGHT: 64 IN | OXYGEN SATURATION: 93 % | HEART RATE: 116 BPM | SYSTOLIC BLOOD PRESSURE: 133 MMHG

## 2022-08-16 DIAGNOSIS — C92.10 CML (CHRONIC MYELOCYTIC LEUKEMIA): ICD-10-CM

## 2022-08-16 DIAGNOSIS — C95.90 LEUKEMIA NOT HAVING ACHIEVED REMISSION, UNSPECIFIED LEUKEMIA TYPE: ICD-10-CM

## 2022-08-16 DIAGNOSIS — R11.10 VOMITING, UNSPECIFIED VOMITING TYPE, UNSPECIFIED WHETHER NAUSEA PRESENT: Primary | ICD-10-CM

## 2022-08-16 DIAGNOSIS — E86.0 DEHYDRATION: ICD-10-CM

## 2022-08-16 DIAGNOSIS — E87.6 HYPOKALEMIA: ICD-10-CM

## 2022-08-16 LAB
ALBUMIN SERPL-MCNC: 3.8 G/DL (ref 3.5–5.2)
ALBUMIN/GLOB SERPL: 1.3 G/DL
ALP SERPL-CCNC: 755 U/L (ref 39–117)
ALT SERPL W P-5'-P-CCNC: 22 U/L (ref 1–33)
ANION GAP SERPL CALCULATED.3IONS-SCNC: 16 MMOL/L (ref 5–15)
AST SERPL-CCNC: 38 U/L (ref 1–32)
BASOPHILS NFR BLD AUTO: ABNORMAL %
BILIRUB SERPL-MCNC: 1.9 MG/DL (ref 0–1.2)
BUN SERPL-MCNC: 16 MG/DL (ref 6–20)
BUN/CREAT SERPL: 20 (ref 7–25)
CALCIUM SPEC-SCNC: 8.8 MG/DL (ref 8.6–10.5)
CHLORIDE SERPL-SCNC: 97 MMOL/L (ref 98–107)
CO2 SERPL-SCNC: 21 MMOL/L (ref 22–29)
CREAT SERPL-MCNC: 0.8 MG/DL (ref 0.57–1)
DEPRECATED RDW RBC AUTO: 74.4 FL (ref 37–54)
EGFRCR SERPLBLD CKD-EPI 2021: 92.2 ML/MIN/1.73
EOSINOPHIL NFR BLD AUTO: ABNORMAL %
ERYTHROCYTE [DISTWIDTH] IN BLOOD BY AUTOMATED COUNT: 24.7 % (ref 12.3–15.4)
GLOBULIN UR ELPH-MCNC: 2.9 GM/DL
GLUCOSE SERPL-MCNC: 350 MG/DL (ref 65–99)
HCT VFR BLD AUTO: 25.5 % (ref 34–46.6)
HGB BLD-MCNC: 8 G/DL (ref 12–15.9)
LYMPHOCYTES NFR BLD AUTO: ABNORMAL %
MCH RBC QN AUTO: 28 PG (ref 26.6–33)
MCHC RBC AUTO-ENTMCNC: 31.4 G/DL (ref 31.5–35.7)
MCV RBC AUTO: 89.2 FL (ref 79–97)
MONOCYTES NFR BLD AUTO: ABNORMAL %
NEUTROPHILS NFR BLD AUTO: ABNORMAL %
PLATELET # BLD AUTO: 715 10*3/MM3 (ref 140–450)
PMV BLD AUTO: 10 FL (ref 6–12)
POTASSIUM SERPL-SCNC: 4.3 MMOL/L (ref 3.5–5.2)
PROT SERPL-MCNC: 6.7 G/DL (ref 6–8.5)
RBC # BLD AUTO: 2.86 10*6/MM3 (ref 3.77–5.28)
SODIUM SERPL-SCNC: 134 MMOL/L (ref 136–145)
WBC NRBC COR # BLD: 220.44 10*3/MM3 (ref 3.4–10.8)

## 2022-08-16 PROCEDURE — 80053 COMPREHEN METABOLIC PANEL: CPT

## 2022-08-16 PROCEDURE — 96374 THER/PROPH/DIAG INJ IV PUSH: CPT

## 2022-08-16 PROCEDURE — 96360 HYDRATION IV INFUSION INIT: CPT

## 2022-08-16 PROCEDURE — 36415 COLL VENOUS BLD VENIPUNCTURE: CPT

## 2022-08-16 PROCEDURE — 25010000002 ONDANSETRON PER 1 MG

## 2022-08-16 PROCEDURE — 96375 TX/PRO/DX INJ NEW DRUG ADDON: CPT

## 2022-08-16 PROCEDURE — 85025 COMPLETE CBC W/AUTO DIFF WBC: CPT

## 2022-08-16 PROCEDURE — 96361 HYDRATE IV INFUSION ADD-ON: CPT

## 2022-08-16 RX ORDER — ONDANSETRON 2 MG/ML
8 INJECTION INTRAMUSCULAR; INTRAVENOUS ONCE
Status: COMPLETED | OUTPATIENT
Start: 2022-08-16 | End: 2022-08-16

## 2022-08-16 RX ORDER — ONDANSETRON 2 MG/ML
INJECTION INTRAMUSCULAR; INTRAVENOUS
Status: COMPLETED
Start: 2022-08-16 | End: 2022-08-16

## 2022-08-16 RX ADMIN — SODIUM CHLORIDE 500 ML: 9 INJECTION, SOLUTION INTRAVENOUS at 15:54

## 2022-08-16 RX ADMIN — ONDANSETRON 8 MG: 2 INJECTION, SOLUTION INTRAMUSCULAR; INTRAVENOUS at 16:13

## 2022-08-16 RX ADMIN — ONDANSETRON 8 MG: 2 INJECTION INTRAMUSCULAR; INTRAVENOUS at 16:13

## 2022-08-16 NOTE — PROGRESS NOTES
Pt here with complaints of nausea, vomiting, and severe abdomen pain. Pt also appears to be very short of air. Denisha Gill NP to see pt.     At discharge IV left in place, pt will return in morning for ivf's.

## 2022-08-16 NOTE — PROGRESS NOTES
Pt resting O2 without oxygen is 93%  3 minutes 47 seconds into walk pt's O2 level dropped to 88% on room air.   Oxygen 2L NC applied to pt, O2 saturation recovered to 99% on oxygen.

## 2022-08-17 ENCOUNTER — SPECIALTY PHARMACY (OUTPATIENT)
Dept: PHARMACY | Facility: HOSPITAL | Age: 47
End: 2022-08-17

## 2022-08-17 ENCOUNTER — HOSPITAL ENCOUNTER (OUTPATIENT)
Dept: ONCOLOGY | Facility: HOSPITAL | Age: 47
Setting detail: INFUSION SERIES
Discharge: HOME | End: 2022-08-17

## 2022-08-17 ENCOUNTER — APPOINTMENT (OUTPATIENT)
Dept: PHARMACY | Facility: HOSPITAL | Age: 47
End: 2022-08-17

## 2022-08-17 VITALS
WEIGHT: 133 LBS | SYSTOLIC BLOOD PRESSURE: 146 MMHG | TEMPERATURE: 96.9 F | OXYGEN SATURATION: 96 % | HEIGHT: 64 IN | BODY MASS INDEX: 22.71 KG/M2 | HEART RATE: 107 BPM | RESPIRATION RATE: 18 BRPM | DIASTOLIC BLOOD PRESSURE: 79 MMHG

## 2022-08-17 DIAGNOSIS — C92.10 CML (CHRONIC MYELOCYTIC LEUKEMIA): ICD-10-CM

## 2022-08-17 DIAGNOSIS — E86.0 DEHYDRATION: ICD-10-CM

## 2022-08-17 DIAGNOSIS — R11.10 VOMITING, UNSPECIFIED VOMITING TYPE, UNSPECIFIED WHETHER NAUSEA PRESENT: Primary | ICD-10-CM

## 2022-08-17 DIAGNOSIS — R09.02 HYPOXIA: Primary | ICD-10-CM

## 2022-08-17 LAB
ALBUMIN SERPL-MCNC: 3.8 G/DL (ref 3.5–5.2)
ALBUMIN/GLOB SERPL: 1.3 G/DL
ALP SERPL-CCNC: 721 U/L (ref 39–117)
ALT SERPL W P-5'-P-CCNC: 21 U/L (ref 1–33)
ANION GAP SERPL CALCULATED.3IONS-SCNC: 14 MMOL/L (ref 5–15)
AST SERPL-CCNC: 21 U/L (ref 1–32)
BASOPHILS NFR BLD AUTO: ABNORMAL %
BILIRUB SERPL-MCNC: 1 MG/DL (ref 0–1.2)
BUN SERPL-MCNC: 23 MG/DL (ref 6–20)
BUN/CREAT SERPL: 24.7 (ref 7–25)
CALCIUM SPEC-SCNC: 8.6 MG/DL (ref 8.6–10.5)
CHLORIDE SERPL-SCNC: 99 MMOL/L (ref 98–107)
CO2 SERPL-SCNC: 20 MMOL/L (ref 22–29)
CREAT SERPL-MCNC: 0.93 MG/DL (ref 0.57–1)
DEPRECATED RDW RBC AUTO: 77.8 FL (ref 37–54)
EGFRCR SERPLBLD CKD-EPI 2021: 76.9 ML/MIN/1.73
EOSINOPHIL NFR BLD AUTO: ABNORMAL %
ERYTHROCYTE [DISTWIDTH] IN BLOOD BY AUTOMATED COUNT: 25.6 % (ref 12.3–15.4)
GLOBULIN UR ELPH-MCNC: 2.9 GM/DL
GLUCOSE SERPL-MCNC: 483 MG/DL (ref 65–99)
HCT VFR BLD AUTO: 25.2 % (ref 34–46.6)
HGB BLD-MCNC: 7.6 G/DL (ref 12–15.9)
LYMPHOCYTES NFR BLD AUTO: ABNORMAL %
MAGNESIUM SERPL-MCNC: 1.8 MG/DL (ref 1.6–2.6)
MCH RBC QN AUTO: 27.4 PG (ref 26.6–33)
MCHC RBC AUTO-ENTMCNC: 30.2 G/DL (ref 31.5–35.7)
MCV RBC AUTO: 91 FL (ref 79–97)
MONOCYTES NFR BLD AUTO: ABNORMAL %
NEUTROPHILS NFR BLD AUTO: ABNORMAL %
PLATELET # BLD AUTO: 690 10*3/MM3 (ref 140–450)
PMV BLD AUTO: 10.4 FL (ref 6–12)
POTASSIUM SERPL-SCNC: 4.5 MMOL/L (ref 3.5–5.2)
PROT SERPL-MCNC: 6.7 G/DL (ref 6–8.5)
RBC # BLD AUTO: 2.77 10*6/MM3 (ref 3.77–5.28)
SODIUM SERPL-SCNC: 133 MMOL/L (ref 136–145)
WBC NRBC COR # BLD: 148.56 10*3/MM3 (ref 3.4–10.8)

## 2022-08-17 PROCEDURE — 80053 COMPREHEN METABOLIC PANEL: CPT | Performed by: NURSE PRACTITIONER

## 2022-08-17 PROCEDURE — 96366 THER/PROPH/DIAG IV INF ADDON: CPT

## 2022-08-17 PROCEDURE — 96360 HYDRATION IV INFUSION INIT: CPT

## 2022-08-17 PROCEDURE — 85025 COMPLETE CBC W/AUTO DIFF WBC: CPT | Performed by: NURSE PRACTITIONER

## 2022-08-17 PROCEDURE — 96374 THER/PROPH/DIAG INJ IV PUSH: CPT

## 2022-08-17 PROCEDURE — 83735 ASSAY OF MAGNESIUM: CPT | Performed by: NURSE PRACTITIONER

## 2022-08-17 PROCEDURE — 25010000002 ONDANSETRON PER 1 MG: Performed by: NURSE PRACTITIONER

## 2022-08-17 PROCEDURE — 96361 HYDRATE IV INFUSION ADD-ON: CPT

## 2022-08-17 RX ORDER — ONDANSETRON 2 MG/ML
8 INJECTION INTRAMUSCULAR; INTRAVENOUS ONCE
Status: COMPLETED | OUTPATIENT
Start: 2022-08-17 | End: 2022-08-17

## 2022-08-17 RX ADMIN — ONDANSETRON 8 MG: 2 INJECTION, SOLUTION INTRAMUSCULAR; INTRAVENOUS at 09:19

## 2022-08-17 RX ADMIN — SODIUM CHLORIDE 500 ML: 9 INJECTION, SOLUTION INTRAVENOUS at 09:19

## 2022-08-17 NOTE — PROGRESS NOTES
Specialty Pharmacy Note:    Medication changing to Gleevec due to side effects.        8/17/2022   WBC 3.40 - 10.80 10*3/mm3 148.56 (A)   Hemoglobin 12.0 - 15.9 g/dL 7.6 (A)   Hematocrit 34.0 - 46.6 % 25.2 (A)   Platelets 140 - 450 10*3/mm3 690 (A)       Thanks,    Ros CALHOUN, PharmD

## 2022-08-17 NOTE — PROGRESS NOTES
Pt here for labs and re-assessment after receiving IVF and nausea medication yesterday.  Pt states she feels about the same.  Attempted to eat Arbys shortly after leaving clinic but vomited it back up.  Has been tolerating drinking water throughout the night and this AM.  Pt reports she has not picked up zofran and compazine that was sent to her pharmacy yesterday.  Labs drawn and cbc with wbc 148.56 and hgb 7.6 today.  JP Denny notified of lab results and assessment.  Order for IVF, zofran, CXR, and cbc for tomorrow received.  Pt notified of plan of care with understanding verbalized.  IVF and zofran given per MAR.  Pt D/C home with AVS given and pt will go to hospital for CXR.

## 2022-08-18 ENCOUNTER — SPECIALTY PHARMACY (OUTPATIENT)
Dept: PHARMACY | Facility: HOSPITAL | Age: 47
End: 2022-08-18

## 2022-08-18 DIAGNOSIS — C95.90 LEUKEMIA NOT HAVING ACHIEVED REMISSION, UNSPECIFIED LEUKEMIA TYPE: ICD-10-CM

## 2022-08-18 DIAGNOSIS — C92.10 BLAST CRISIS PHASE OF CHRONIC MYELOID LEUKEMIA: Primary | ICD-10-CM

## 2022-08-18 RX ORDER — IMATINIB MESYLATE 400 MG/1
400 TABLET, FILM COATED ORAL DAILY
Qty: 30 TABLET | Refills: 5 | Status: SHIPPED | OUTPATIENT
Start: 2022-08-18 | End: 2022-11-18

## 2022-08-18 NOTE — PROGRESS NOTES
Specialty Pharmacy Note: Gleevec    Contacted Nieves's mother, Sayra, after patient was a no show for for appointment on 8/18/22 at 9:10 am.  She is going to try and get patient and find out what happened.  Let her know that we were able to get a romulo for [facility] to cover cost of Gleevec but patient education needs to be completed prior to dispensing.  Call back number of [phone] given.        Thanks,    Ros CALHOUN, PharmD

## 2022-08-24 ENCOUNTER — DOCUMENTATION (OUTPATIENT)
Dept: PHARMACY | Facility: HOSPITAL | Age: 47
End: 2022-08-24

## 2022-08-24 ENCOUNTER — SPECIALTY PHARMACY (OUTPATIENT)
Dept: PHARMACY | Facility: HOSPITAL | Age: 47
End: 2022-08-24

## 2022-08-25 ENCOUNTER — SPECIALTY PHARMACY (OUTPATIENT)
Dept: PHARMACY | Facility: HOSPITAL | Age: 47
End: 2022-08-25

## 2022-08-25 ENCOUNTER — APPOINTMENT (OUTPATIENT)
Dept: PHARMACY | Facility: HOSPITAL | Age: 47
End: 2022-08-25

## 2022-08-30 ENCOUNTER — LAB (OUTPATIENT)
Dept: LAB | Facility: HOSPITAL | Age: 47
End: 2022-08-30

## 2022-08-30 ENCOUNTER — SPECIALTY PHARMACY (OUTPATIENT)
Dept: PHARMACY | Facility: HOSPITAL | Age: 47
End: 2022-08-30

## 2022-08-30 DIAGNOSIS — C95.90 LEUKEMIA NOT HAVING ACHIEVED REMISSION, UNSPECIFIED LEUKEMIA TYPE: ICD-10-CM

## 2022-08-30 DIAGNOSIS — R09.02 HYPOXIA: ICD-10-CM

## 2022-08-30 DIAGNOSIS — C92.10 CML (CHRONIC MYELOCYTIC LEUKEMIA): ICD-10-CM

## 2022-08-30 DIAGNOSIS — C91.10 CLL (CHRONIC LYMPHOCYTIC LEUKEMIA): ICD-10-CM

## 2022-08-30 LAB
ALBUMIN SERPL-MCNC: 3.7 G/DL (ref 3.5–5.2)
ALBUMIN/GLOB SERPL: 1.1 G/DL
ALP SERPL-CCNC: 723 U/L (ref 39–117)
ALT SERPL W P-5'-P-CCNC: 13 U/L (ref 1–33)
ANION GAP SERPL CALCULATED.3IONS-SCNC: 15 MMOL/L (ref 5–15)
AST SERPL-CCNC: 18 U/L (ref 1–32)
BASOPHILS NFR BLD AUTO: ABNORMAL %
BILIRUB SERPL-MCNC: 1.1 MG/DL (ref 0–1.2)
BUN SERPL-MCNC: 8 MG/DL (ref 6–20)
BUN/CREAT SERPL: 12.5 (ref 7–25)
CALCIUM SPEC-SCNC: 8.7 MG/DL (ref 8.6–10.5)
CHLORIDE SERPL-SCNC: 97 MMOL/L (ref 98–107)
CO2 SERPL-SCNC: 24 MMOL/L (ref 22–29)
CREAT SERPL-MCNC: 0.64 MG/DL (ref 0.57–1)
DEPRECATED RDW RBC AUTO: 67.1 FL (ref 37–54)
EGFRCR SERPLBLD CKD-EPI 2021: 110.5 ML/MIN/1.73
EOSINOPHIL NFR BLD AUTO: ABNORMAL %
ERYTHROCYTE [DISTWIDTH] IN BLOOD BY AUTOMATED COUNT: 21.7 % (ref 12.3–15.4)
GLOBULIN UR ELPH-MCNC: 3.3 GM/DL
GLUCOSE SERPL-MCNC: 454 MG/DL (ref 65–99)
HCT VFR BLD AUTO: 31.3 % (ref 34–46.6)
HGB BLD-MCNC: 9.5 G/DL (ref 12–15.9)
LYMPHOCYTES NFR BLD AUTO: ABNORMAL %
MCH RBC QN AUTO: 27.1 PG (ref 26.6–33)
MCHC RBC AUTO-ENTMCNC: 30.4 G/DL (ref 31.5–35.7)
MCV RBC AUTO: 89.4 FL (ref 79–97)
MONOCYTES NFR BLD AUTO: ABNORMAL %
NEUTROPHILS NFR BLD AUTO: ABNORMAL %
PLATELET # BLD AUTO: 288 10*3/MM3 (ref 140–450)
PMV BLD AUTO: 9.9 FL (ref 6–12)
POTASSIUM SERPL-SCNC: 4.3 MMOL/L (ref 3.5–5.2)
PROT SERPL-MCNC: 7 G/DL (ref 6–8.5)
RBC # BLD AUTO: 3.5 10*6/MM3 (ref 3.77–5.28)
SODIUM SERPL-SCNC: 136 MMOL/L (ref 136–145)
WBC NRBC COR # BLD: 152.69 10*3/MM3 (ref 3.4–10.8)

## 2022-08-30 PROCEDURE — 80053 COMPREHEN METABOLIC PANEL: CPT

## 2022-08-30 PROCEDURE — 36415 COLL VENOUS BLD VENIPUNCTURE: CPT

## 2022-08-30 PROCEDURE — 85025 COMPLETE CBC W/AUTO DIFF WBC: CPT

## 2022-08-30 NOTE — PROGRESS NOTES
[facility] Oral Chemo Education Appointment:     Patient Name/:  Nieves Mc  1975    Medication Regimen (including dosing/administration):  Gleevec (imatinib) 400 mg daily with food and glass of water  Date to Start Medication: anticipated start day of 22    Diagnosis/Indication: Chronic myeloid leukemia  Expected duration of therapy: until disease progression or unacceptable toxicity  Side effects reviewed with patient including: decreased WBC/increased risk for infection , decreased platelets/increased risk for bleed, decreased hemoglobin, fatigue, nausea/vomiting, diarrhea, headache, skin rash/itchy skin, abdominal pain, changes in liver function, electrolyte changes and fluid retention  Preventative/supportive medications: ondansetron (pt reports issues with N/V in past while taking medication; advised pt to take ondansetron 30-60 minutes prior to taking Gleevec to prevent nausea).       Additional counseling:   - Reviewed proper administration: Discussed if the patient is handling their own medications, then they need to wash their hands properly after touching the medication.  If a caregiver is assisting with handling medication, need to wear disposal gloves and wash hands properly afterwards. Patient was counseled to take Take with food, at the same time each day.   - Reviewed prior storage of medication: Store medication safe away from children and pets, away from light, and at room temperature. If you use a pill box, use a separate pill box from other medication.   - Counseled patient on safe handling of soiled linen and proper flush technique and reviewed proper disposal of medication.   - Reviewed what to do in the event of a missed dose: Do not take an extra dose or two doses at one time. Simply take your next dose at the regularly scheduled time.  - Reviewed expected goals/outcomes, contraindications and safety precautions, including when to seek medical care.  - Counseled that women should not  become pregnant and men should not get a partner pregnant while taking; men and women of childbearing age and potential should use effective contraception during and after therapy.    Provided patient with:   Education sheets about the medication, 24-hour clinic phone number and my contact information and instructions to call should additional questions arise.     Completed medication reconciliation today to assess for drug interactions.   Reviewed concomitant medications, allergies, labs, comorbidities/medical history, quality of life, and immunization history.   Drug-drug interactions noted: no significant drug interactions noted. Of note, when reviewing medication, she did state she does not take a lot of her medication due to not having RX insurance at this time and struggles with taking medications.  Advised pt to call the clinic if any new medications are started so we can assess for drug-drug interactions     Wrap-up:  Discussed aforementioned material with patient in person, face-to-face, in clinic.   Chemo consents/CCA were signed at today's visit.   Medication availability: patient will receive medication from [facility] pharmacy on: 9/2/22 and patient is aware to contact our office if medication is not delivered in timely manner  Patient expressed understanding.   Barriers to self administration/adherence identified: She has struggled with taking medications in the past. She admits to struggles with managing her anxiety and chronic illnesses. Advised her to speak with our SW and dietician. Pt aware she will receive phone calls from LORENZO and BOBBY. Methods for Supporting Patient Self-Administration/Adherence: Discussed importance of medication and taking to help manage her disease. Pt concerned regarding nausea she has experienced in past with medication. Advised her to take zofran 30-60 minutes prior to taking Gleevec..   All questions and concerns addressed.     Amy Aldridge RPH  8/30/2022  16:35 EDT

## 2022-08-31 ENCOUNTER — SPECIALTY PHARMACY (OUTPATIENT)
Dept: PHARMACY | Facility: HOSPITAL | Age: 47
End: 2022-08-31

## 2022-08-31 RX ORDER — ONDANSETRON HYDROCHLORIDE 8 MG/1
8 TABLET, FILM COATED ORAL EVERY 8 HOURS PRN
Qty: 30 TABLET | Refills: 1 | Status: SHIPPED | OUTPATIENT
Start: 2022-08-31 | End: 2022-09-22 | Stop reason: SDUPTHER

## 2022-08-31 NOTE — PROGRESS NOTES
Specialty Pharmacy Initial Fill Note     Hope initial fill of Gleevec will be delivered tomorrow Thursday 9/1/22 and is covered through [employer]. She has no insurance. Her Zofran could not be covered by [employer] so it was asked to be sent to her local pharmacy [facility].        Delivery Questions    Flowsheet Row Most Recent Value   Delivery method FedEx   Delivery address correct? Yes   Delivery phone number [phone]   Preferred delivery time? AM   Number of medications in delivery 1   Medication being filled and delivered Gleevec   Doses left of specialty medications [facility]   Is there any medication that is due not being filled? No   Supplies needed? No supplies needed   Cooler needed? No   Do any medications need mixed or dated? No   [employer] form of payment Payment plan already set up   Additional comments Use [employer] for Gleevec   Questions or concerns for the pharmacist? No   Explain any questions or concerns for the pharmacist NA   Are any medications first time fills? Yes          Ship Wednesday 8/31/22 to arrive Thursday 9/1/22.       Follow-up: 3 week(s)     Jyotsna Anton, Pharmacy Technician  Specialty Pharmacy Technician

## 2022-09-01 DIAGNOSIS — F41.8 DEPRESSION WITH ANXIETY: Primary | ICD-10-CM

## 2022-09-01 DIAGNOSIS — C95.90 LEUKEMIA NOT HAVING ACHIEVED REMISSION, UNSPECIFIED LEUKEMIA TYPE: ICD-10-CM

## 2022-09-01 RX ORDER — TRAZODONE HYDROCHLORIDE 50 MG/1
50 TABLET ORAL NIGHTLY
Qty: 30 TABLET | Refills: 2 | Status: SHIPPED | OUTPATIENT
Start: 2022-09-01 | End: 2022-09-22

## 2022-09-01 RX ORDER — CITALOPRAM 10 MG/1
10 TABLET ORAL DAILY
Qty: 30 TABLET | Refills: 2 | Status: SHIPPED | OUTPATIENT
Start: 2022-09-01 | End: 2022-11-28 | Stop reason: SDUPTHER

## 2022-09-02 ENCOUNTER — TELEPHONE (OUTPATIENT)
Dept: ONCOLOGY | Facility: CLINIC | Age: 47
End: 2022-09-02

## 2022-09-02 NOTE — TELEPHONE ENCOUNTER
Hub is instructed to read the documentation below to patient  ATTEMPTED TO CONTACT PATIENT REGARDING [facility] F/U & GLEEVEC TEACHING APPT.  NO ANSWER.  LMV ASKING PATIENT TO CALL BACK.

## 2022-09-06 ENCOUNTER — SPECIALTY PHARMACY (OUTPATIENT)
Dept: PHARMACY | Facility: HOSPITAL | Age: 47
End: 2022-09-06

## 2022-09-06 DIAGNOSIS — C92.10 CML (CHRONIC MYELOCYTIC LEUKEMIA): ICD-10-CM

## 2022-09-06 RX ORDER — HYDROXYUREA 500 MG/1
1000 CAPSULE ORAL 2 TIMES DAILY
Qty: 120 CAPSULE | Refills: 0 | Status: SHIPPED | OUTPATIENT
Start: 2022-09-06 | End: 2022-09-26 | Stop reason: SDUPTHER

## 2022-09-06 NOTE — PROGRESS NOTES
Specialty Pharmacy Refill Coordination Note     Nieves is a 46 y.o. female contacted today regarding refills of hydroxyurea specialty medication(s).    Reviewed and verified with patient:       Specialty medication(s) and dose(s) confirmed: yes    Refill Questions    Flowsheet Row Most Recent Value   Changes to allergies? No   Changes to medications? No   New conditions since last clinic visit No   Unplanned office visit, urgent care, ED, or hospital admission in the last 4 weeks  No   How does patient/caregiver feel medication is working? Good   Financial problems or insurance changes  No   Since the previous refill, were any specialty medication doses or scheduled injections missed or delayed?  No   Does this patient require a clinical escalation to a pharmacist? No          Delivery Questions    Flowsheet Row Most Recent Value   Delivery method FedEx   Delivery address correct? Yes   Delivery phone number [phone]   Preferred delivery time? AM   Number of medications in delivery 1   Medication being filled and delivered hydroxyurea   Doses left of specialty medications a few   Is there any medication that is due not being filled? No   Supplies needed? No supplies needed   Cooler needed? No   Do any medications need mixed or dated? No   Cop[employer] form of payment Payment plan already set up   Additional comments NA   Questions or concerns for the pharmacist? No   Explain any questions or concerns for the pharmacist NA   Are any medications first time fills? No          Ship on Wednesday 9/7/22 to arrive Thursday 9/8/22.    Follow-up: 3 week(s)     Jyotsna Anton, Pharmacy Technician  Specialty Pharmacy Technician

## 2022-09-08 ENCOUNTER — SPECIALTY PHARMACY (OUTPATIENT)
Dept: PHARMACY | Facility: HOSPITAL | Age: 47
End: 2022-09-08

## 2022-09-12 ENCOUNTER — TELEPHONE (OUTPATIENT)
Dept: PAIN MEDICINE | Facility: CLINIC | Age: 47
End: 2022-09-12

## 2022-09-12 RX ORDER — OXYCODONE AND ACETAMINOPHEN 10; 325 MG/1; MG/1
1 TABLET ORAL EVERY 8 HOURS PRN
Qty: 90 TABLET | Refills: 0 | Status: SHIPPED | OUTPATIENT
Start: 2022-09-12 | End: 2022-09-14 | Stop reason: SDUPTHER

## 2022-09-14 ENCOUNTER — TELEPHONE (OUTPATIENT)
Dept: PAIN MEDICINE | Facility: CLINIC | Age: 47
End: 2022-09-14

## 2022-09-14 RX ORDER — OXYCODONE AND ACETAMINOPHEN 10; 325 MG/1; MG/1
1 TABLET ORAL EVERY 8 HOURS PRN
Qty: 90 TABLET | Refills: 0 | Status: SHIPPED | OUTPATIENT
Start: 2022-09-14 | End: 2022-10-19

## 2022-09-14 NOTE — TELEPHONE ENCOUNTER
Provider: DR COBB    Caller: J LUIS KOWALSKI    Relationship to Patient: SELF    Pharmacy: [facility] [place] ON [address]    Phone Number: [phone]    Reason for Call: PT CALLING BACK ABOUT MEDICATION, SAID THAT [facility] STILL HAS NOT RECEIVED THE RX WITH THE CODE REQUIRED. SHE SPOKE WITH THE PHARMACY THIS MORNING.

## 2022-09-14 NOTE — PROGRESS NOTES
Hematology/Oncology Outpatient Follow Up    PATIENT NAME:Nieves Mc  :1975  MRN: [id number]  PRIMARY CARE PHYSICIAN: Alida Latham APRN  REFERRING PHYSICIAN: Alida Latham, *    Chief Complaint   Patient presents with   • Follow-up     Leukemia not having achieved remission, unspecified leukemia type        HISTORY OF PRESENT ILLNESS:      Patient is a 46 y.o. female with PMH significant for CML on treatment with Imatinib.  Patient stated that she typically feels poorly with Imatinib with frequent nausea and vomiting.  Also complained of weakness and fatigue.  Patient presented to ED on 10/22/18 with complaints of an elevated blood sugar around 400.  Stated she felt poorly and has had a productive cough with yellow sputum.  Complained of nausea and stated she was unable to keep any food down anytime she ate.  The patient reported subjective fever without chills.  She stated she had been coughing so hard that she was vomiting.  She denied hematemesis.  Denied diarrhea, constipation or blood in her stool.       Patient’s chest x-ray showed no evidence of acute pulmonary embolus.  The patient has ground-glass opacities throughout the lungs.  There is some air trapping noted which would appear to be   infectious.  Patient was started on antibiotics.      Hem/Onc was consulted on 10/23/18 for co-management of patient with CML.  Patient was seen by Dr. Lerma on 10/12 on previous admission for patient reported CML on Imatinib (Gleevec).  Patient reports she is under the care of Dr. Roach at [facility] in [place].  Patient was seen by Dr. Lerma in May 2018 when patient presented with hematuria, which was attributed to her Imatinib.  Dr. Lerma followed during hospitalization but then patient returned to Dr. Roach for her usual follow up.  She was again seen on 10/12.  Patient is stating she will switch to Dr. Lerma.    · Review of Dr. Roach’s note:  On 18 patient had  BCR-abl which was 61.326.  Patient had been on Imatinib 400 mg daily.  She had presented in March 2018 with WBC of 24, hemoglobin 13.1, platelet count of 1,067,000.  Patient apparently had follow up BCR-abl in August 2018, results are not available for review.  Her bone marrow aspiration and biopsy was also performed at that time is currently not available for review.    · 11/6/18 - .  Uric acid 6.5.  BCR-abl by RT-PCR was measured at 3.36%.    · Due to thrombocytosis, patient was placed on Hydroxyurea 500 mg twice a day on 12/11/18.  Platelet count juana to 900,000.  Patient was noncompliant with CBCs that were recommended.    Patient was asked to return to the office after not being able to reach her.   · 12/27/18 - Bone marrow aspiration and biopsy was consistent with chronic myeloid leukemia.  BCR-abl positive, chronic phase.  ABL kinase mutational analysis is currently pending on the bone marrow.    · 1/3/19 - Repeat CBC, WBC 17, hemoglobin 11.9, platelet count 1,072,000.  Hydroxyurea was increased to 1 gm twice a day with follow up CBC.    · 1/6/19 - Follow up CBC showed progressive increase in platelet to 1.1 million.  Patient was offered admission to the hospital, which she declined.  Hydroxyurea was increased to 1 gm three   times a day as of 1/6/19.   · 1/7/19 - EKG shows sinus tachycardia.   milliseconds.   ·  ABL kinase on peripheral blood was ordered on 1/11/19.  Based on sequence analysis, there was no mutation detected.    · 2/12/19 - Patient was initiated on Tasigna 300 mg twice a day.  · 2/13/19 - Urinalysis was negative for hematuria.   · 2/22/19 -  milliseconds.  · 3/13/19 - EKG with QTC is 413 milliseconds.  Nonspecific changes noted.    · 4/18/19 - EKG showed QTC prolonged to 453 milliseconds.  This is changed from prior.  · 4/18/19 - Free T4 normal at 0.91.  Magnesium was 1.7.  BUN is 6.  Creatinine 0.7.  Bilirubin 2.2.  Phosphorous normal 3.7.  TSH 0.43, normal.  Free T3  was normal at 3.47.  Ionized calcium   normal at 1.23.  BCR-abl was 0.522%.    · 19 - EKG showed QTC of 441 milliseconds.  QT was 366.     · Patient has been lost to follow-up since 2019 for CML.  Patient states that she lost her insurance.  She also does not have any primary care physician at this time.  She is diabetic on insulin.  · Patient was recently admitted to the hospital for pneumonia due to COVID-19 infection.  At that time patient made a decision to become compliant with treatment.  She is currently on to [employer]a 300 mg p.o. twice a day.  She is also on hydroxyurea 1 g twice a day.  Overall she feels better, she has more energy.  · 3/1/2022 white count is 53.92, hemoglobin 11.7 and platelets are 472    · 2022: Patient has not been seen since 2022.  Also verified that she has not been taking to Tasigna since 2022.  She comes in today with cough for 1 and half months, shortness of breath, denies fevers.  · 2022 patient had 2D echocardiogram which showed an EF of 41 to 45%.  Left ventricular cavity is mildly dilated.  She was diagnosed with nonischemic cardiomyopathy  · 2022: Patient started on imatinib 400 mg daily.  Continued on hydroxyurea 1 g twice a day    Past Medical History:   Diagnosis Date   • Bone pain    • COVID-19 virus infection 2022   • Diabetes mellitus (HCC)    • Extremity pain     Carlin. legs pain   • Leg pain     left leg greater   • Migraine    • Pulmonary embolism (HCC)    • Vision loss     doing surgery       Past Surgical History:   Procedure Laterality Date   • BONE MARROW BIOPSY     • BREAST SURGERY     • BRONCHOSCOPY N/A 2022    Procedure: BRONCHOSCOPY bilateral lung washing;  Surgeon: Charlene Camara MD;  Location: [facility] ENDOSCOPY;  Service: Pulmonary;  Laterality: N/A;  post: rule out infection vs transfusion lung injury   •  SECTION     • CHOLECYSTECTOMY     • EYE SURGERY      laser surgery due  to hemmorage--- 2021-- another  surgery  lt eye11/15/21   • RETINAL DETACHMENT SURGERY     • SPINE SURGERY      Lombardi spinal block   • TUBAL ABDOMINAL LIGATION           Current Outpatient Medications:   •  allopurinol (ZYLOPRIM) 100 MG tablet, Take 1 tablet by mouth Daily., Disp: 30 tablet, Rfl: 0  •  ALPRAZolam (Xanax) 0.25 MG tablet, Take 1 tablet by mouth At Night As Needed for Anxiety., Disp: 30 tablet, Rfl: 0  •  budesonide-formoterol (SYMBICORT) 160-4.5 MCG/ACT inhaler, Inhale 2 puffs 2 (Two) Times a Day., Disp: 10.2 g, Rfl: 1  •  citalopram (CeleXA) 10 MG tablet, Take 1 tablet by mouth Daily. To begin 1/31/22, Disp: 30 tablet, Rfl: 2  •  doxycycline (VIBRAMYCIN) 100 MG capsule, Take 1 capsule by mouth 2 (Two) Times a Day., Disp: 10 capsule, Rfl: 0  •  folic acid (FOLVITE) 1 MG tablet, Take 1 tablet by mouth Daily., Disp: 30 tablet, Rfl: 0  •  guaiFENesin (MUCINEX) 600 MG 12 hr tablet, Take 1 tablet by mouth Every 12 (Twelve) Hours., Disp: 30 tablet, Rfl: 0  •  hydroxyurea (HYDREA) 500 MG capsule, Take 2 capsules by mouth 2 (Two) Times a Day., Disp: 120 capsule, Rfl: 0  •  imatinib (GLEEVEC) 400 MG chemo tablet, Take 1 tablet by mouth Daily for 30 days. Take with food and glass of water. Do not take with Grapefruit juice., Disp: 30 tablet, Rfl: 5  •  Insulin Glargine (BASAGLAR KWIKPEN) 100 UNIT/ML injection pen, Inject 20 Units under the skin into the appropriate area as directed Daily., Disp: 10 mL, Rfl: 3  •  Insulin Lispro (ADMELOG SOLOSTAR) 100 UNIT/ML injection pen, Inject 6 Units under the skin into the appropriate area as directed 3 (Three) Times a Day., Disp: 3 mL, Rfl: 3  •  losartan (COZAAR) 25 MG tablet, Take 1 tablet by mouth Daily., Disp: 30 tablet, Rfl: 0  •  metoprolol succinate XL (TOPROL-XL) 25 MG 24 hr tablet, Take 25 mg by mouth Daily., Disp: , Rfl:   •  ondansetron (Zofran) 8 MG tablet, Take 1 tablet by mouth Every 8 (Eight) Hours As Needed for Nausea or Vomiting., Disp: 30 tablet, Rfl: 1  •  ondansetron ODT  (ZOFRAN-ODT) 8 MG disintegrating tablet, Place 1 tablet on the tongue Every 8 (Eight) Hours As Needed for Nausea or Vomiting., Disp: 20 tablet, Rfl: 2  •  oxyCODONE-acetaminophen (PERCOCET)  MG per tablet, Take 1 tablet by mouth Every 8 (Eight) Hours As Needed for Moderate Pain., Disp: 90 tablet, Rfl: 0  •  prochlorperazine (COMPAZINE) 25 MG suppository, Insert 1 suppository into the rectum Every 12 (Twelve) Hours As Needed for Nausea or Vomiting., Disp: 14 suppository, Rfl: 2  •  rivaroxaban (XARELTO) 20 MG tablet, Take 1 tablet by mouth Daily., Disp: 90 tablet, Rfl: 0  •  traZODone (DESYREL) 50 MG tablet, Take 1 tablet by mouth Every Night., Disp: 30 tablet, Rfl: 2    Current Facility-Administered Medications:   •  mupirocin (BACTROBAN) 2 % ointment 1 application, 1 application, Topical, Q12H, Denisha Gill APRN    Allergies   Allergen Reactions   • Hydromorphone GI Intolerance     dilaudid   • Morphine Nausea And Vomiting   • Propofol Hives     Previously tolerated being premedicated with diphenhydramine and famotadine       Family History   Problem Relation Age of Onset   • Diabetes Mother    • Diabetes Maternal Grandmother    • Heart attack Maternal Grandmother    • Stroke Maternal Grandmother        Cancer-related family history is not on file.    Social History     Tobacco Use   • Smoking status: Never Smoker   • Smokeless tobacco: Never Used   Vaping Use   • Vaping Use: Never used   Substance Use Topics   • Alcohol use: No   • Drug use: No       HPI, ROS and PFSH have been reviewed and confirmed on 9/15/2022.     SUBJECTIVE:  Patient is here for routine follow-up.  She began her imatinib 10 days ago and has remained on her hydroxyurea 1 g twice a day.  She reports that she does have nausea with the imatinib but she is working through it by taking her ondansetron prior on as needed.  She reports that she started with a cough this morning and is worried about a respiratory infection.  She also  reports that she has several skin lesions on her left upper thigh that she has itched and pick that that she believes may be infected.  She denies any fever but states she does feel hot all the time.          REVIEW OF SYSTEMS:  Review of Systems   Constitutional: Negative for chills and fever.   HENT: Negative for ear pain, mouth sores, nosebleeds and sore throat.    Eyes: Negative for photophobia and visual disturbance.   Respiratory: Negative for wheezing and stridor. Positive for SOA and Cough.  Cardiovascular: Negative for chest pain and palpitations.   Gastrointestinal: Negative for diarrhea, Positive for nausea and vomiting.   Endocrine: Negative for cold intolerance and heat intolerance.   Genitourinary: Negative for dysuria and hematuria.   Musculoskeletal: Negative for joint swelling and neck stiffness.   Skin: Negative for color change and rash. Skin lesions  Neurological: Negative for seizures and syncope.   Hematological: Negative for adenopathy.        No obvious bleeding   Psychiatric/Behavioral: Negative for agitation, confusion and hallucinations.       OBJECTIVE:    Vitals:    09/15/22 1111   BP: 126/82   Pulse: 109   Temp: 97.1 °F (36.2 °C)   SpO2: 98%   Weight: 60.8 kg (134 lb)  Comment: verbal   PainSc: 10-Worst pain ever   PainLoc: Generalized     Body mass index is 23 kg/m².    ECOG  (1) Restricted in physically strenuous activity, ambulatory and able to do work of light nature    Physical Exam  Vitals and nursing note reviewed.   Constitutional:       General: She is not in acute distress.     Appearance: She is not diaphoretic.   HENT:      Head: Normocephalic and atraumatic.   Eyes:      General: No scleral icterus.        Right eye: No discharge.         Left eye: No discharge.      Conjunctiva/sclera: Conjunctivae normal.   Neck:      Thyroid: No thyromegaly.   Cardiovascular:      Rate and Rhythm: Normal rate and regular rhythm.      Heart sounds: Normal heart sounds. No friction rub. No  gallop.    Pulmonary:      Effort: Pulmonary effort is normal. No respiratory distress.      Breath sounds: No stridor. No wheezing.   Abdominal:      General: Bowel sounds are normal.      Palpations: Abdomen is soft. There is no mass.      Tenderness: There is no abdominal tenderness. There is no guarding or rebound.   Musculoskeletal:         General: No tenderness. Normal range of motion.      Cervical back: Normal range of motion and neck supple.   Lymphadenopathy:      Cervical: No cervical adenopathy.   Skin:     General: Skin is warm.      Findings: No erythema or rash. Papular skin lesions to the left upper thigh with surrounding erythema  Neurological:      Mental Status: She is alert and oriented to person, place, and time.      Motor: No abnormal muscle tone.   Psychiatric:         Behavior: Behavior normal.     I have reexamined the patient and the results are consistent with the previously documented exam. JP Varela     RECENT LABS    WBC   Date Value Ref Range Status   09/15/2022 158.97 (C) 3.40 - 10.80 10*3/mm3 Final     RBC   Date Value Ref Range Status   09/15/2022 3.86 3.77 - 5.28 10*6/mm3 Final     Hemoglobin   Date Value Ref Range Status   09/15/2022 10.3 (L) 12.0 - 15.9 g/dL Final     Hematocrit   Date Value Ref Range Status   09/15/2022 33.9 (L) 34.0 - 46.6 % Final     MCV   Date Value Ref Range Status   09/15/2022 87.8 79.0 - 97.0 fL Final     MCH   Date Value Ref Range Status   09/15/2022 26.7 26.6 - 33.0 pg Final     MCHC   Date Value Ref Range Status   09/15/2022 30.4 (L) 31.5 - 35.7 g/dL Final     RDW   Date Value Ref Range Status   09/15/2022 18.9 (H) 12.3 - 15.4 % Final     RDW-SD   Date Value Ref Range Status   09/15/2022 58.4 (H) 37.0 - 54.0 fl Final     MPV   Date Value Ref Range Status   09/15/2022 9.5 6.0 - 12.0 fL Final     Platelets   Date Value Ref Range Status   09/15/2022 235 140 - 450 10*3/mm3 Final     Neutrophil %   Date Value Ref Range Status   10/16/2019  60.0 42.7 - 76.0 % Final     Lymphocyte %   Date Value Ref Range Status   10/16/2019 32.1 19.6 - 45.3 % Final     Monocyte %   Date Value Ref Range Status   09/15/2022 12.4 (H) 5.0 - 12.0 % Final     Eosinophil %   Date Value Ref Range Status   09/15/2022 4.5 0.3 - 6.2 % Final     Basophil %   Date Value Ref Range Status   10/16/2019 1.3 0.0 - 1.5 % Final     Neutrophils Absolute   Date Value Ref Range Status   08/05/2022 70.25 (H) 1.70 - 7.00 10*3/mm3 Final     Neutrophils, Absolute   Date Value Ref Range Status   10/16/2019 5.60 1.70 - 7.00 10*3/mm3 Final     Lymphocytes, Absolute   Date Value Ref Range Status   10/16/2019 3.00 0.70 - 3.10 10*3/mm3 Final     Monocytes, Absolute   Date Value Ref Range Status   09/15/2022 19.67 (H) 0.10 - 0.90 10*3/mm3 Final     Eosinophils, Absolute   Date Value Ref Range Status   09/15/2022 7.23 (H) 0.00 - 0.40 10*3/mm3 Final     Basophils Absolute   Date Value Ref Range Status   08/05/2022 12.46 (H) 0.00 - 0.20 10*3/mm3 Final     Basophils, Absolute   Date Value Ref Range Status   10/16/2019 0.10 0.00 - 0.20 10*3/mm3 Final     nRBC   Date Value Ref Range Status   08/04/2022 2.0 (H) 0.0 - 0.2 /100 WBC Final   10/16/2019 0.2 0.0 - 0.2 /100 WBC Final       Lab Results   Component Value Date    GLUCOSE 454 (C) 08/30/2022    BUN 8 08/30/2022    CREATININE 0.64 08/30/2022    EGFRIFNONA 133 02/02/2022    BCR 12.5 08/30/2022    K 4.3 08/30/2022    CO2 24.0 08/30/2022    CALCIUM 8.7 08/30/2022    ALBUMIN 3.70 08/30/2022    LABIL2 1.0 04/18/2019    AST 18 08/30/2022    ALT 13 08/30/2022           ASSESSMENT    · CML not in remission  · Noncompliant with TKI's for her CML  · Recent cardiomyopathy diagnosis with EF around 45%, nonischemic.  Follows up with Dr. Ruiz with cardiology services  · We will get pharmacist involved to see which TKI may be most appropriate for her given her cardiomyopathy.  After pharmacist review, plan is for Gleevec 400 mg po daily-d/w Dr. Lerma  · CML in  chronic phase with no significant hematologic or molecular or cytogenetic response on   Imatinib.  ABL kinase mutational analysis was negative.  We  have represcribed to Tasigna 300 mg twice a day.  Patient has been noncompliant with treatments and ended up in the hospital with hyperleukocytosis, peripheral blood blasts.  Bone marrow biopsy suggest that she remains in chronic phase.  Continue to Tasiigna at the current doses.  Hydroxyurea has been discontinued.  · Uncontrolled diabetes type 1, followed at the [facility] by Dr. Calles  · Chronic anemia: Stable, multifactorial from CML, to Tasigna, hydroxyurea.  This has improved  · Insomnia  · Situational anxiety, not suicidal, Zoloft is not helping. Weaned off Elavil since anxiety not improved.Discontinued Celexa 10 mg p.o. every morning due to prolonged QTC.  ·  Hyperleukocytosis secondary to CML  · History of medical noncompliance  · Pulmonary embolism: Xarelto restarted  · Recent COVID-19 pneumonia  · History of prolonged QTC  · Skin infection        Plans    · Reviewed CBC with patient, white blood cell count elevated but stable, platelets and hemoglobin stable  · Continue imatinib  · Continue hydroxyurea 1 g twice a day   · Doxycycline and mupirocin for skin infection, doxycycline also covers for bronchitis/URI  · Continue antiemetics  · GI consult for continued nausea and vomiting (this was present prior to TKI)  · Patient failed 6 minute walk-Home O2 ordered  · Weekly CBC and CMP for now  · Return to office in 1 week to re-eval skin findings and repeat CBC  · Reviewed her EKG completed on 3/28/2022  · Continue Xanax 0.25 mg p.o. nightly as needed number 30 pills.   · All questions answered         I spent 40 total minutes, face-to-face, caring for Hope today.  90% of this time involved counseling and/or coordination of care as documented within this note.

## 2022-09-15 ENCOUNTER — SPECIALTY PHARMACY (OUTPATIENT)
Dept: PHARMACY | Facility: HOSPITAL | Age: 47
End: 2022-09-15

## 2022-09-15 ENCOUNTER — OFFICE VISIT (OUTPATIENT)
Dept: ONCOLOGY | Facility: CLINIC | Age: 47
End: 2022-09-15

## 2022-09-15 ENCOUNTER — APPOINTMENT (OUTPATIENT)
Dept: LAB | Facility: HOSPITAL | Age: 47
End: 2022-09-15

## 2022-09-15 VITALS
SYSTOLIC BLOOD PRESSURE: 126 MMHG | DIASTOLIC BLOOD PRESSURE: 82 MMHG | HEART RATE: 109 BPM | BODY MASS INDEX: 23 KG/M2 | TEMPERATURE: 97.1 F | OXYGEN SATURATION: 98 % | WEIGHT: 134 LBS

## 2022-09-15 DIAGNOSIS — C95.90 LEUKEMIA NOT HAVING ACHIEVED REMISSION, UNSPECIFIED LEUKEMIA TYPE: Primary | ICD-10-CM

## 2022-09-15 DIAGNOSIS — C92.10 BLAST CRISIS PHASE OF CHRONIC MYELOID LEUKEMIA: ICD-10-CM

## 2022-09-15 DIAGNOSIS — C92.10 CML (CHRONIC MYELOCYTIC LEUKEMIA): ICD-10-CM

## 2022-09-15 DIAGNOSIS — R06.09 DYSPNEA ON EXERTION: Primary | ICD-10-CM

## 2022-09-15 LAB
ALBUMIN SERPL-MCNC: 3.9 G/DL (ref 3.5–5.2)
ALBUMIN/GLOB SERPL: 1.3 G/DL
ALP SERPL-CCNC: 752 U/L (ref 39–117)
ALT SERPL W P-5'-P-CCNC: 24 U/L (ref 1–33)
ANION GAP SERPL CALCULATED.3IONS-SCNC: 11 MMOL/L (ref 5–15)
AST SERPL-CCNC: 26 U/L (ref 1–32)
BASOPHILS NFR BLD AUTO: ABNORMAL %
BILIRUB SERPL-MCNC: 0.8 MG/DL (ref 0–1.2)
BUN SERPL-MCNC: 9 MG/DL (ref 6–20)
BUN/CREAT SERPL: 12.2 (ref 7–25)
CALCIUM SPEC-SCNC: 8.7 MG/DL (ref 8.6–10.5)
CHLORIDE SERPL-SCNC: 101 MMOL/L (ref 98–107)
CO2 SERPL-SCNC: 24 MMOL/L (ref 22–29)
CREAT SERPL-MCNC: 0.74 MG/DL (ref 0.57–1)
DEPRECATED RDW RBC AUTO: 58.4 FL (ref 37–54)
EGFRCR SERPLBLD CKD-EPI 2021: 101.2 ML/MIN/1.73
EOSINOPHIL # BLD AUTO: 7.23 10*3/MM3 (ref 0–0.4)
EOSINOPHIL NFR BLD AUTO: 4.5 % (ref 0.3–6.2)
ERYTHROCYTE [DISTWIDTH] IN BLOOD BY AUTOMATED COUNT: 18.9 % (ref 12.3–15.4)
GLOBULIN UR ELPH-MCNC: 3.1 GM/DL
GLUCOSE SERPL-MCNC: 266 MG/DL (ref 65–99)
HCT VFR BLD AUTO: 33.9 % (ref 34–46.6)
HGB BLD-MCNC: 10.3 G/DL (ref 12–15.9)
HOLD SPECIMEN: NORMAL
LYMPHOCYTES NFR BLD AUTO: ABNORMAL %
MCH RBC QN AUTO: 26.7 PG (ref 26.6–33)
MCHC RBC AUTO-ENTMCNC: 30.4 G/DL (ref 31.5–35.7)
MCV RBC AUTO: 87.8 FL (ref 79–97)
MONOCYTES # BLD AUTO: 19.67 10*3/MM3 (ref 0.1–0.9)
MONOCYTES NFR BLD AUTO: 12.4 % (ref 5–12)
NEUTROPHILS NFR BLD AUTO: ABNORMAL %
PHOSPHATE SERPL-MCNC: 4.4 MG/DL (ref 2.5–4.5)
PLATELET # BLD AUTO: 235 10*3/MM3 (ref 140–450)
PMV BLD AUTO: 9.5 FL (ref 6–12)
POTASSIUM SERPL-SCNC: 4.1 MMOL/L (ref 3.5–5.2)
PROT SERPL-MCNC: 7 G/DL (ref 6–8.5)
RBC # BLD AUTO: 3.86 10*6/MM3 (ref 3.77–5.28)
SODIUM SERPL-SCNC: 136 MMOL/L (ref 136–145)
WBC NRBC COR # BLD: 158.97 10*3/MM3 (ref 3.4–10.8)

## 2022-09-15 PROCEDURE — 99215 OFFICE O/P EST HI 40 MIN: CPT | Performed by: NURSE PRACTITIONER

## 2022-09-15 PROCEDURE — 36415 COLL VENOUS BLD VENIPUNCTURE: CPT | Performed by: NURSE PRACTITIONER

## 2022-09-15 PROCEDURE — 80053 COMPREHEN METABOLIC PANEL: CPT | Performed by: NURSE PRACTITIONER

## 2022-09-15 PROCEDURE — 84100 ASSAY OF PHOSPHORUS: CPT | Performed by: NURSE PRACTITIONER

## 2022-09-15 RX ORDER — DOXYCYCLINE HYCLATE 100 MG/1
100 CAPSULE ORAL 2 TIMES DAILY
Qty: 10 CAPSULE | Refills: 0 | Status: SHIPPED | OUTPATIENT
Start: 2022-09-15 | End: 2022-10-12

## 2022-09-15 NOTE — PROGRESS NOTES
Specialty Pharmacy Note: Gleevec        9/15/2022   WBC 3.40 - 10.80 10*3/mm3 158.97 (A)   Hemoglobin 12.0 - 15.9 g/dL 10.3 (A)   Hematocrit 34.0 - 46.6 % 33.9 (A)   Platelets 140 - 450 10*3/mm3 235   Creatinine 0.57 - 1.00 mg/dL 0.74   BUN 6 - 20 mg/dL 9   Sodium 136 - 145 mmol/L 136   Potassium 3.5 - 5.2 mmol/L 4.1   Glucose 65 - 99 mg/dL 266 (A)   Calcium 8.6 - 10.5 mg/dL 8.7   Albumin 3.50 - 5.20 g/dL 3.90   Total Protein 6.0 - 8.5 g/dL 7.0   AST (SGOT) 1 - 32 U/L 26   ALT (SGPT) 1 - 33 U/L 24   Alkaline Phosphatase 39 - 117 U/L 752 (A)   Total Bilirubin 0.0 - 1.2 mg/dL 0.8   Phosphorus 2.5 - 4.5 mg/dL 4.4     Contacted pt via [employer] (preferred pt form of communication) last week. Pt states she is struggling with N/V despite taking zofran and taking medication with a meal. Pt's BG remains uncontrolled. Pt was seen by APRN in clinic today - pt to continue Gleevec and referral to GI. Pharmacy will continue to monitor.      Thank you,  Amy Aldridge, Pharm.D.

## 2022-09-20 ENCOUNTER — TELEPHONE (OUTPATIENT)
Dept: ONCOLOGY | Facility: CLINIC | Age: 47
End: 2022-09-20

## 2022-09-20 NOTE — TELEPHONE ENCOUNTER
Caller: JANNY    Relationship: [facility]     Best call back number:  DIRECT NUMBER [phone]    What is the best time to reach you: 9-5    Who are you requesting to speak with (clinical staff, provider,  specific staff member): SANDIE TRAMMELL         What was the call regarding: RECEIVED ORDER FOR OXYGEN     NEEDING TO GET COPY OF PATIENTS WALK TEST.     ALSO NEED ADDENDUM TO CHART NOTE DISCUSSING WHAT DX THE OXYGEN IS BEING ORDERED FOR.  IF CAN PLEASE FAX TO  FAX: [phone]         Do you require a callback: NO UNLESS QUESTIONS

## 2022-09-21 NOTE — PROGRESS NOTES
Hematology/Oncology Outpatient Follow Up    PATIENT NAME:Nieves Mc  :1975  MRN: [id number]  PRIMARY CARE PHYSICIAN: Alida Latham APRN  REFERRING PHYSICIAN: Alida Latham, *    Chief Complaint   Patient presents with   • Follow-up     Leukemia not having achieved remission, unspecified leukemia type        HISTORY OF PRESENT ILLNESS:      Patient is a 46 y.o. female with PMH significant for CML on treatment with Imatinib.  Patient stated that she typically feels poorly with Imatinib with frequent nausea and vomiting.  Also complained of weakness and fatigue.  Patient presented to ED on 10/22/18 with complaints of an elevated blood sugar around 400.  Stated she felt poorly and has had a productive cough with yellow sputum.  Complained of nausea and stated she was unable to keep any food down anytime she ate.  The patient reported subjective fever without chills.  She stated she had been coughing so hard that she was vomiting.  She denied hematemesis.  Denied diarrhea, constipation or blood in her stool.       Patient’s chest x-ray showed no evidence of acute pulmonary embolus.  The patient has ground-glass opacities throughout the lungs.  There is some air trapping noted which would appear to be   infectious.  Patient was started on antibiotics.      Hem/Onc was consulted on 10/23/18 for co-management of patient with CML.  Patient was seen by Dr. Lerma on 10/12 on previous admission for patient reported CML on Imatinib (Gleevec).  Patient reports she is under the care of Dr. Roach at [facility] in [place].  Patient was seen by Dr. Lerma in May 2018 when patient presented with hematuria, which was attributed to her Imatinib.  Dr. Lerma followed during hospitalization but then patient returned to Dr. Roach for her usual follow up.  She was again seen on 10/12.  Patient is stating she will switch to Dr. Lerma.    · Review of Dr. Roach’s note:  On 18 patient had  BCR-abl which was 61.326.  Patient had been on Imatinib 400 mg daily.  She had presented in March 2018 with WBC of 24, hemoglobin 13.1, platelet count of 1,067,000.  Patient apparently had follow up BCR-abl in August 2018, results are not available for review.  Her bone marrow aspiration and biopsy was also performed at that time is currently not available for review.    · 11/6/18 - .  Uric acid 6.5.  BCR-abl by RT-PCR was measured at 3.36%.    · Due to thrombocytosis, patient was placed on Hydroxyurea 500 mg twice a day on 12/11/18.  Platelet count juana to 900,000.  Patient was noncompliant with CBCs that were recommended.    Patient was asked to return to the office after not being able to reach her.   · 12/27/18 - Bone marrow aspiration and biopsy was consistent with chronic myeloid leukemia.  BCR-abl positive, chronic phase.  ABL kinase mutational analysis is currently pending on the bone marrow.    · 1/3/19 - Repeat CBC, WBC 17, hemoglobin 11.9, platelet count 1,072,000.  Hydroxyurea was increased to 1 gm twice a day with follow up CBC.    · 1/6/19 - Follow up CBC showed progressive increase in platelet to 1.1 million.  Patient was offered admission to the hospital, which she declined.  Hydroxyurea was increased to 1 gm three   times a day as of 1/6/19.   · 1/7/19 - EKG shows sinus tachycardia.   milliseconds.   ·  ABL kinase on peripheral blood was ordered on 1/11/19.  Based on sequence analysis, there was no mutation detected.    · 2/12/19 - Patient was initiated on Tasigna 300 mg twice a day.  · 2/13/19 - Urinalysis was negative for hematuria.   · 2/22/19 -  milliseconds.  · 3/13/19 - EKG with QTC is 413 milliseconds.  Nonspecific changes noted.    · 4/18/19 - EKG showed QTC prolonged to 453 milliseconds.  This is changed from prior.  · 4/18/19 - Free T4 normal at 0.91.  Magnesium was 1.7.  BUN is 6.  Creatinine 0.7.  Bilirubin 2.2.  Phosphorous normal 3.7.  TSH 0.43, normal.  Free T3  was normal at 3.47.  Ionized calcium   normal at 1.23.  BCR-abl was 0.522%.    · 19 - EKG showed QTC of 441 milliseconds.  QT was 366.     · Patient has been lost to follow-up since 2019 for CML.  Patient states that she lost her insurance.  She also does not have any primary care physician at this time.  She is diabetic on insulin.  · Patient was recently admitted to the hospital for pneumonia due to COVID-19 infection.  At that time patient made a decision to become compliant with treatment.  She is currently on to Cigna 300 mg p.o. twice a day.  She is also on hydroxyurea 1 g twice a day.  Overall she feels better, she has more energy.  · 3/1/2022 white count is 53.92, hemoglobin 11.7 and platelets are 472    · 2022: Patient has not been seen since 2022.  Also verified that she has not been taking to Tasigna since 2022.  She comes in today with cough for 1 and half months, shortness of breath, denies fevers.  · 2022 patient had 2D echocardiogram which showed an EF of 41 to 45%.  Left ventricular cavity is mildly dilated.  She was diagnosed with nonischemic cardiomyopathy  · 2022: Patient started on imatinib 400 mg daily.  Continued on hydroxyurea 1 g twice a day    Past Medical History:   Diagnosis Date   • Bone pain    • COVID-19 virus infection 2022   • Diabetes mellitus (HCC)    • Extremity pain     Carlin. legs pain   • Leg pain     left leg greater   • Migraine    • Pulmonary embolism (HCC)    • Vision loss     doing surgery       Past Surgical History:   Procedure Laterality Date   • BONE MARROW BIOPSY     • BREAST SURGERY     • BRONCHOSCOPY N/A 2022    Procedure: BRONCHOSCOPY bilateral lung washing;  Surgeon: Charlene Camara MD;  Location: [facility];  Service: Pulmonary;  Laterality: N/A;  post: rule out infection vs transfusion lung injury   •  SECTION     • CHOLECYSTECTOMY     • EYE SURGERY      laser surgery due  to hemmorage--- 2021-- another  surgery  lt eye11/15/21   • RETINAL DETACHMENT SURGERY     • SPINE SURGERY      Lombardi spinal block   • TUBAL ABDOMINAL LIGATION           Current Outpatient Medications:   •  allopurinol (ZYLOPRIM) 100 MG tablet, Take 1 tablet by mouth Daily., Disp: 30 tablet, Rfl: 0  •  ALPRAZolam (Xanax) 0.25 MG tablet, Take 1 tablet by mouth At Night As Needed for Anxiety., Disp: 30 tablet, Rfl: 0  •  budesonide-formoterol (SYMBICORT) 160-4.5 MCG/ACT inhaler, Inhale 2 puffs 2 (Two) Times a Day., Disp: 10.2 g, Rfl: 1  •  citalopram (CeleXA) 10 MG tablet, Take 1 tablet by mouth Daily. To begin 1/31/22, Disp: 30 tablet, Rfl: 2  •  doxycycline (VIBRAMYCIN) 100 MG capsule, Take 1 capsule by mouth 2 (Two) Times a Day., Disp: 10 capsule, Rfl: 0  •  folic acid (FOLVITE) 1 MG tablet, Take 1 tablet by mouth Daily., Disp: 30 tablet, Rfl: 0  •  guaiFENesin (MUCINEX) 600 MG 12 hr tablet, Take 1 tablet by mouth Every 12 (Twelve) Hours., Disp: 30 tablet, Rfl: 0  •  hydroxyurea (HYDREA) 500 MG capsule, Take 2 capsules by mouth 2 (Two) Times a Day., Disp: 120 capsule, Rfl: 0  •  imatinib (GLEEVEC) 400 MG chemo tablet, Take 1 tablet by mouth Daily for 30 days. Take with food and glass of water. Do not take with Grapefruit juice., Disp: 30 tablet, Rfl: 5  •  Insulin Glargine (BASAGLAR KWIKPEN) 100 UNIT/ML injection pen, Inject 20 Units under the skin into the appropriate area as directed Daily., Disp: 10 mL, Rfl: 3  •  Insulin Lispro (ADMELOG SOLOSTAR) 100 UNIT/ML injection pen, Inject 6 Units under the skin into the appropriate area as directed 3 (Three) Times a Day., Disp: 3 mL, Rfl: 3  •  losartan (COZAAR) 25 MG tablet, Take 1 tablet by mouth Daily., Disp: 30 tablet, Rfl: 0  •  metoprolol succinate XL (TOPROL-XL) 25 MG 24 hr tablet, Take 25 mg by mouth Daily., Disp: , Rfl:   •  ondansetron ODT (ZOFRAN-ODT) 8 MG disintegrating tablet, Place 1 tablet on the tongue Every 8 (Eight) Hours As Needed for Nausea or Vomiting., Disp: 20 tablet, Rfl:  2  •  oxyCODONE-acetaminophen (PERCOCET)  MG per tablet, Take 1 tablet by mouth Every 8 (Eight) Hours As Needed for Moderate Pain., Disp: 90 tablet, Rfl: 0  •  prochlorperazine (COMPAZINE) 25 MG suppository, Insert 1 suppository into the rectum Every 12 (Twelve) Hours As Needed for Nausea or Vomiting., Disp: 14 suppository, Rfl: 2  •  rivaroxaban (XARELTO) 20 MG tablet, Take 1 tablet by mouth Daily., Disp: 90 tablet, Rfl: 0  •  traZODone (DESYREL) 50 MG tablet, Take 1 tablet by mouth Every Night., Disp: 30 tablet, Rfl: 2    Current Facility-Administered Medications:   •  mupirocin (BACTROBAN) 2 % ointment 1 application, 1 application, Topical, Q12H, Denisha Gill APRN    Allergies   Allergen Reactions   • Hydromorphone GI Intolerance     dilaudid   • Morphine Nausea And Vomiting   • Propofol Hives     Previously tolerated being premedicated with diphenhydramine and famotadine       Family History   Problem Relation Age of Onset   • Diabetes Mother    • Diabetes Maternal Grandmother    • Heart attack Maternal Grandmother    • Stroke Maternal Grandmother        Cancer-related family history is not on file.    Social History     Tobacco Use   • Smoking status: Never Smoker   • Smokeless tobacco: Never Used   Vaping Use   • Vaping Use: Never used   Substance Use Topics   • Alcohol use: No   • Drug use: No       HPI, ROS and PFSH have been reviewed and confirmed on 9/22/2022.     SUBJECTIVE:  Patient here for follow-up of skin infection and chest symptoms.  Skin infection has somewhat improved but she reports that while she is almost finished the doxycycline it did cause nausea and vomiting.  She also reports that she did not get the mupirocin from the pharmacy.  She states her chest symptoms have not resolved and in fact she believes they are worse.  She reports she is compliant with her Gleevec and her hydroxyurea.  She states that she is having difficulty with sleep.  She is currently on trazodone  "nightly and it does not help at all.  She does get some relief with her as needed Xanax.  She states while she was in the hospital she was on a different medication that was an antidepressant that helped with sleep and appetite and she would like to be on that again.          REVIEW OF SYSTEMS:  Review of Systems   Constitutional: Negative for chills and fever.   HENT: Negative for ear pain, mouth sores, nosebleeds and sore throat.    Eyes: Negative for photophobia and visual disturbance.   Respiratory: Negative for wheezing and stridor. Positive for SOA and Cough.  Cardiovascular: Negative for chest pain and palpitations.   Gastrointestinal: Negative for diarrhea, Positive for nausea and vomiting.   Endocrine: Negative for cold intolerance and heat intolerance.   Genitourinary: Negative for dysuria and hematuria.   Musculoskeletal: Negative for joint swelling and neck stiffness.   Skin: Negative for color change and rash. Skin lesions  Neurological: Negative for seizures and syncope.   Hematological: Negative for adenopathy.        No obvious bleeding   Psychiatric/Behavioral: Negative for agitation, confusion and hallucinations.       OBJECTIVE:    Vitals:    09/22/22 0959   BP: 127/81   Pulse: 98   Temp: 96.9 °F (36.1 °C)   SpO2: 98%   Weight: 60.8 kg (134 lb)  Comment: verbal   Height: 162.6 cm (64\")   PainSc:   8   PainLoc: Generalized     Body mass index is 23 kg/m².    ECOG  (1) Restricted in physically strenuous activity, ambulatory and able to do work of light nature    Physical Exam  Vitals and nursing note reviewed.   Constitutional:       General: She is not in acute distress.     Appearance: She is not diaphoretic.   HENT:      Head: Normocephalic and atraumatic.   Eyes:      General: No scleral icterus.        Right eye: No discharge.         Left eye: No discharge.      Conjunctiva/sclera: Conjunctivae normal.   Neck:      Thyroid: No thyromegaly.   Cardiovascular:      Rate and Rhythm: Normal rate " and regular rhythm.      Heart sounds: Normal heart sounds. No friction rub. No gallop.    Pulmonary:      Effort: Pulmonary effort is normal. No respiratory distress.      Breath sounds: Scattered Rhonci and diminished bases.  No stridor. No wheezing.   Abdominal:      General: Bowel sounds are normal.      Palpations: Abdomen is soft. There is no mass.      Tenderness: There is no abdominal tenderness. There is no guarding or rebound.   Musculoskeletal:         General: No tenderness. Normal range of motion.      Cervical back: Normal range of motion and neck supple.   Lymphadenopathy:      Cervical: No cervical adenopathy.   Skin:     General: Skin is warm.      Findings: No erythema or rash. Papular skin lesions to the left upper thigh with surrounding erythema-scabbed over.   Neurological:      Mental Status: She is alert and oriented to person, place, and time.      Motor: No abnormal muscle tone.   Psychiatric:         Behavior: Behavior normal.     I have reexamined the patient and the results are consistent with the previously documented exam. Denisha Gill, JP     RECENT LABS    WBC   Date Value Ref Range Status   09/22/2022 143.91 (C) 3.40 - 10.80 10*3/mm3 Final     RBC   Date Value Ref Range Status   09/22/2022 3.74 (L) 3.77 - 5.28 10*6/mm3 Final     Hemoglobin   Date Value Ref Range Status   09/22/2022 9.9 (L) 12.0 - 15.9 g/dL Final     Hematocrit   Date Value Ref Range Status   09/22/2022 31.5 (L) 34.0 - 46.6 % Final     MCV   Date Value Ref Range Status   09/22/2022 84.2 79.0 - 97.0 fL Final     MCH   Date Value Ref Range Status   09/22/2022 26.5 (L) 26.6 - 33.0 pg Final     MCHC   Date Value Ref Range Status   09/22/2022 31.4 (L) 31.5 - 35.7 g/dL Final     RDW   Date Value Ref Range Status   09/22/2022 18.1 (H) 12.3 - 15.4 % Final     RDW-SD   Date Value Ref Range Status   09/22/2022 53.2 37.0 - 54.0 fl Final     MPV   Date Value Ref Range Status   09/22/2022 10.3 6.0 - 12.0 fL Final      Platelets   Date Value Ref Range Status   09/22/2022 422 140 - 450 10*3/mm3 Final     Neutrophil %   Date Value Ref Range Status   10/16/2019 60.0 42.7 - 76.0 % Final     Lymphocyte %   Date Value Ref Range Status   10/16/2019 32.1 19.6 - 45.3 % Final     Monocyte %   Date Value Ref Range Status   09/15/2022 12.4 (H) 5.0 - 12.0 % Final     Eosinophil %   Date Value Ref Range Status   09/15/2022 4.5 0.3 - 6.2 % Final     Basophil %   Date Value Ref Range Status   10/16/2019 1.3 0.0 - 1.5 % Final     Neutrophils Absolute   Date Value Ref Range Status   08/05/2022 70.25 (H) 1.70 - 7.00 10*3/mm3 Final     Neutrophils, Absolute   Date Value Ref Range Status   10/16/2019 5.60 1.70 - 7.00 10*3/mm3 Final     Lymphocytes, Absolute   Date Value Ref Range Status   10/16/2019 3.00 0.70 - 3.10 10*3/mm3 Final     Monocytes, Absolute   Date Value Ref Range Status   09/15/2022 19.67 (H) 0.10 - 0.90 10*3/mm3 Final     Eosinophils, Absolute   Date Value Ref Range Status   09/15/2022 7.23 (H) 0.00 - 0.40 10*3/mm3 Final     Basophils Absolute   Date Value Ref Range Status   08/05/2022 12.46 (H) 0.00 - 0.20 10*3/mm3 Final     Basophils, Absolute   Date Value Ref Range Status   10/16/2019 0.10 0.00 - 0.20 10*3/mm3 Final     nRBC   Date Value Ref Range Status   08/04/2022 2.0 (H) 0.0 - 0.2 /100 WBC Final   10/16/2019 0.2 0.0 - 0.2 /100 WBC Final       Lab Results   Component Value Date    GLUCOSE 266 (H) 09/15/2022    BUN 9 09/15/2022    CREATININE 0.74 09/15/2022    EGFRIFNONA 133 02/02/2022    BCR 12.2 09/15/2022    K 4.1 09/15/2022    CO2 24.0 09/15/2022    CALCIUM 8.7 09/15/2022    ALBUMIN 3.90 09/15/2022    LABIL2 1.0 04/18/2019    AST 26 09/15/2022    ALT 24 09/15/2022           ASSESSMENT    · CML not in remission  · Noncompliant with TKI's for her CML  · Recent cardiomyopathy diagnosis with EF around 45%, nonischemic.  Follows up with Dr. Ruiz with cardiology services  · We will get pharmacist involved to see which TKI may  be most appropriate for her given her cardiomyopathy.  After pharmacist review, plan is for Gleevec 400 mg po daily-d/w Dr. Lerma  · CML in chronic phase with no significant hematologic or molecular or cytogenetic response on   Imatinib.  ABL kinase mutational analysis was negative.  We  have represcribed to Tasigna 300 mg twice a day.  Patient has been noncompliant with treatments and ended up in the hospital with hyperleukocytosis, peripheral blood blasts.  Bone marrow biopsy suggest that she remains in chronic phase.  Continue to Tasiigna at the current doses.  Hydroxyurea has been discontinued.  · Uncontrolled diabetes type 1, followed at the [facility] by Dr. Calles  · Chronic anemia: Stable, multifactorial from CML, to Tasigna, hydroxyurea.  This has improved  · Insomnia.  On trazodone.  Not helpful.  Will trial mirtazapine 7.5 mg p.o. nightly  · Situational anxiety, not suicidal, Zoloft is not helping. Weaned off Elavil since anxiety not improved. On Celexa 10 mg p.o. every morning and Xanax in the evening PRN  ·  Hyperleukocytosis secondary to CML  · History of medical noncompliance  · Pulmonary embolism: Xarelto restarted  · Recent COVID-19 pneumonia  · History of prolonged QTC.  Resolved on most recent EKG.   · Skin infection  · Chronic pain.  Managed per pain management physician.  · Pneumonia/Hx of pneumonitis/hx of ARF:  CXR showed improvement from June 2022, but some residual.        Plans    · Reviewed CBC with patient, white blood cell count elevated but decreasing, platelets and hemoglobin stable  · Continue imatinib  · Continue hydroxyurea 1 g twice a day  · Chest x-ray today showed residual infection since prior-added Augmentin 875 po BID  · Skin infection improving on doxycycline today, did not receive mupirocin from pharmacy so will call back in.  · Taper trazodone due to lack of effect and begin mirtazapine 7.5 mg p.o. nightly will increase if needed once trazodone taper  completed  · Continue antiemetics  · GI consult for continued nausea and vomiting (this was present prior to TKI)  · Patient failed 6 minute walk-Home O2 ordered.  Diagnosis of hypoxia related to cardiomyopathy, CHF, pneumonia, CML, hx. Of pneumonitis and ARF.  · Weekly CBC and CMP for now  · Last EKG showed normal QT, will repeat today  · Continue Xanax 0.25 mg p.o. nightly as needed number 30 pills.   · All questions answered  · Follow-up with Dr. Lerma in 1 month as previously scheduled.         I spent 40 total minutes, face-to-face, caring for Hope today.  90% of this time involved counseling and/or coordination of care as documented within this note.

## 2022-09-22 ENCOUNTER — LAB (OUTPATIENT)
Dept: LAB | Facility: HOSPITAL | Age: 47
End: 2022-09-22

## 2022-09-22 ENCOUNTER — HOSPITAL ENCOUNTER (OUTPATIENT)
Dept: GENERAL RADIOLOGY | Facility: HOSPITAL | Age: 47
Discharge: HOME | End: 2022-09-22

## 2022-09-22 ENCOUNTER — OFFICE VISIT (OUTPATIENT)
Dept: ONCOLOGY | Facility: CLINIC | Age: 47
End: 2022-09-22

## 2022-09-22 ENCOUNTER — HOSPITAL ENCOUNTER (OUTPATIENT)
Dept: CARDIOLOGY | Facility: HOSPITAL | Age: 47
Discharge: HOME | End: 2022-09-22

## 2022-09-22 VITALS
TEMPERATURE: 96.9 F | WEIGHT: 134 LBS | DIASTOLIC BLOOD PRESSURE: 81 MMHG | HEART RATE: 98 BPM | SYSTOLIC BLOOD PRESSURE: 127 MMHG | BODY MASS INDEX: 22.88 KG/M2 | HEIGHT: 64 IN | OXYGEN SATURATION: 98 %

## 2022-09-22 DIAGNOSIS — C95.90 LEUKEMIA NOT HAVING ACHIEVED REMISSION, UNSPECIFIED LEUKEMIA TYPE: ICD-10-CM

## 2022-09-22 DIAGNOSIS — F41.9 ANXIETY: ICD-10-CM

## 2022-09-22 DIAGNOSIS — C92.10 BLAST CRISIS PHASE OF CHRONIC MYELOID LEUKEMIA: ICD-10-CM

## 2022-09-22 DIAGNOSIS — L08.9 SKIN INFECTION: ICD-10-CM

## 2022-09-22 DIAGNOSIS — C92.10 CML (CHRONIC MYELOCYTIC LEUKEMIA): ICD-10-CM

## 2022-09-22 DIAGNOSIS — C92.10 CML (CHRONIC MYELOCYTIC LEUKEMIA): Primary | ICD-10-CM

## 2022-09-22 DIAGNOSIS — C91.10 CLL (CHRONIC LYMPHOCYTIC LEUKEMIA): ICD-10-CM

## 2022-09-22 DIAGNOSIS — F41.8 DEPRESSION WITH ANXIETY: ICD-10-CM

## 2022-09-22 DIAGNOSIS — J18.9 PNEUMONIA OF BOTH LUNGS DUE TO INFECTIOUS ORGANISM, UNSPECIFIED PART OF LUNG: ICD-10-CM

## 2022-09-22 DIAGNOSIS — E87.6 HYPOKALEMIA: ICD-10-CM

## 2022-09-22 LAB
ALBUMIN SERPL-MCNC: 3.7 G/DL (ref 3.5–5.2)
ALBUMIN/GLOB SERPL: 1 G/DL
ALP SERPL-CCNC: 1117 U/L (ref 39–117)
ALT SERPL W P-5'-P-CCNC: 18 U/L (ref 1–33)
ANION GAP SERPL CALCULATED.3IONS-SCNC: 13 MMOL/L (ref 5–15)
AST SERPL-CCNC: 18 U/L (ref 1–32)
BASOPHILS NFR BLD AUTO: ABNORMAL %
BILIRUB SERPL-MCNC: 1.2 MG/DL (ref 0–1.2)
BUN SERPL-MCNC: 11 MG/DL (ref 6–20)
BUN/CREAT SERPL: 15.5 (ref 7–25)
CALCIUM SPEC-SCNC: 8.9 MG/DL (ref 8.6–10.5)
CHLORIDE SERPL-SCNC: 101 MMOL/L (ref 98–107)
CO2 SERPL-SCNC: 25 MMOL/L (ref 22–29)
CREAT SERPL-MCNC: 0.71 MG/DL (ref 0.57–1)
DEPRECATED RDW RBC AUTO: 53.2 FL (ref 37–54)
EGFRCR SERPLBLD CKD-EPI 2021: 106.3 ML/MIN/1.73
EOSINOPHIL NFR BLD AUTO: ABNORMAL %
ERYTHROCYTE [DISTWIDTH] IN BLOOD BY AUTOMATED COUNT: 18.1 % (ref 12.3–15.4)
GLOBULIN UR ELPH-MCNC: 3.6 GM/DL
GLUCOSE SERPL-MCNC: 206 MG/DL (ref 65–99)
HCT VFR BLD AUTO: 31.5 % (ref 34–46.6)
HGB BLD-MCNC: 9.9 G/DL (ref 12–15.9)
HOLD SPECIMEN: NORMAL
LYMPHOCYTES NFR BLD AUTO: ABNORMAL %
MCH RBC QN AUTO: 26.5 PG (ref 26.6–33)
MCHC RBC AUTO-ENTMCNC: 31.4 G/DL (ref 31.5–35.7)
MCV RBC AUTO: 84.2 FL (ref 79–97)
MONOCYTES NFR BLD AUTO: ABNORMAL %
NEUTROPHILS NFR BLD AUTO: ABNORMAL %
PLATELET # BLD AUTO: 422 10*3/MM3 (ref 140–450)
PMV BLD AUTO: 10.3 FL (ref 6–12)
POTASSIUM SERPL-SCNC: 3.3 MMOL/L (ref 3.5–5.2)
PROT SERPL-MCNC: 7.3 G/DL (ref 6–8.5)
RBC # BLD AUTO: 3.74 10*6/MM3 (ref 3.77–5.28)
SODIUM SERPL-SCNC: 139 MMOL/L (ref 136–145)
WBC NRBC COR # BLD: 143.91 10*3/MM3 (ref 3.4–10.8)

## 2022-09-22 PROCEDURE — 36415 COLL VENOUS BLD VENIPUNCTURE: CPT

## 2022-09-22 PROCEDURE — 93005 ELECTROCARDIOGRAM TRACING: CPT | Performed by: NURSE PRACTITIONER

## 2022-09-22 PROCEDURE — 93010 ELECTROCARDIOGRAM REPORT: CPT | Performed by: INTERNAL MEDICINE

## 2022-09-22 PROCEDURE — 80053 COMPREHEN METABOLIC PANEL: CPT

## 2022-09-22 PROCEDURE — 99215 OFFICE O/P EST HI 40 MIN: CPT | Performed by: NURSE PRACTITIONER

## 2022-09-22 PROCEDURE — 71046 X-RAY EXAM CHEST 2 VIEWS: CPT

## 2022-09-22 PROCEDURE — 85025 COMPLETE CBC W/AUTO DIFF WBC: CPT

## 2022-09-22 RX ORDER — MUPIROCIN CALCIUM 20 MG/G
1 CREAM TOPICAL 2 TIMES DAILY
Qty: 30 G | Refills: 0 | Status: SHIPPED | OUTPATIENT
Start: 2022-09-22 | End: 2022-10-12

## 2022-09-22 RX ORDER — ALPRAZOLAM 0.25 MG/1
0.25 TABLET ORAL NIGHTLY PRN
Qty: 30 TABLET | Refills: 0 | Status: SHIPPED | OUTPATIENT
Start: 2022-09-22 | End: 2022-10-19 | Stop reason: SDUPTHER

## 2022-09-22 RX ORDER — MIRTAZAPINE 15 MG/1
7.5 TABLET, FILM COATED ORAL NIGHTLY
Qty: 15 TABLET | Refills: 0 | Status: SHIPPED | OUTPATIENT
Start: 2022-09-22 | End: 2022-10-12

## 2022-09-22 RX ORDER — POTASSIUM CHLORIDE 750 MG/1
20 TABLET, FILM COATED, EXTENDED RELEASE ORAL DAILY
Qty: 10 TABLET | Refills: 0 | Status: SHIPPED | OUTPATIENT
Start: 2022-09-22 | End: 2022-09-27

## 2022-09-22 RX ORDER — AMOXICILLIN AND CLAVULANATE POTASSIUM 875; 125 MG/1; MG/1
1 TABLET, FILM COATED ORAL 2 TIMES DAILY
Qty: 20 TABLET | Refills: 0 | Status: SHIPPED | OUTPATIENT
Start: 2022-09-22 | End: 2022-10-02

## 2022-09-23 NOTE — TELEPHONE ENCOUNTER
JANNY FROM [facility] CALLING AGAIN TO SPEAK TO SOMEONE, TRIED TO W/T NO ANSWER.    PLEASE CALL JANNY BACK [phone]

## 2022-09-26 ENCOUNTER — SPECIALTY PHARMACY (OUTPATIENT)
Dept: PHARMACY | Facility: HOSPITAL | Age: 47
End: 2022-09-26

## 2022-09-26 DIAGNOSIS — R06.09 DYSPNEA ON EXERTION: Primary | ICD-10-CM

## 2022-09-26 DIAGNOSIS — C92.10 CML (CHRONIC MYELOCYTIC LEUKEMIA): ICD-10-CM

## 2022-09-26 RX ORDER — HYDROXYUREA 500 MG/1
1000 CAPSULE ORAL 2 TIMES DAILY
Qty: 120 CAPSULE | Refills: 0 | Status: SHIPPED | OUTPATIENT
Start: 2022-09-26 | End: 2022-11-01 | Stop reason: SDUPTHER

## 2022-09-26 NOTE — TELEPHONE ENCOUNTER
Spoke to Ara on Friday, she stated that the walking oximetry is , but [facility] can send someone to the pt's house for assessment if order is entered into Epic. Order entered and faxed to [facility].

## 2022-09-26 NOTE — PROGRESS NOTES
Specialty Pharmacy Note: Gleevec        9/22/2022   WBC 3.40 - 10.80 10*3/mm3 143.91 (A)   Hemoglobin 12.0 - 15.9 g/dL 9.9 (A)   Hematocrit 34.0 - 46.6 % 31.5 (A)   Platelets 140 - 450 10*3/mm3 422   Creatinine 0.57 - 1.00 mg/dL 0.71   BUN 6 - 20 mg/dL 11   Sodium 136 - 145 mmol/L 139   Potassium 3.5 - 5.2 mmol/L 3.3 (A)   Glucose 65 - 99 mg/dL 206 (A)   Calcium 8.6 - 10.5 mg/dL 8.9   Albumin 3.50 - 5.20 g/dL 3.70   Total Protein 6.0 - 8.5 g/dL 7.3   AST (SGOT) 1 - 32 U/L 18   ALT (SGPT) 1 - 33 U/L 18   Alkaline Phosphatase 39 - 117 U/L 1,117 (A)   Total Bilirubin 0.0 - 1.2 mg/dL 1.2     Rx for potassium sent to patient's pharmacy. Repeat labs scheduled for 9/29. Pharmacy will continue to follow.        Thank you,  Amy Aldridge, Pharm.D.

## 2022-09-27 LAB
INTERPRETATION: POSITIVE
LAB DIRECTOR NAME PROVIDER: NORMAL
LABORATORY COMMENT REPORT: NORMAL
REF LAB TEST METHOD: NORMAL
T(ABL1,BCR)B2A2/CONTROL BLD/T: 11.83 %
T(ABL1,BCR)B3A2/CONTROL BLD/T: 59.85 %
T(ABL1,BCR)E1A2/CONTROL BLD/T: 0.07 %

## 2022-09-28 ENCOUNTER — TELEPHONE (OUTPATIENT)
Dept: ONCOLOGY | Facility: CLINIC | Age: 47
End: 2022-09-28

## 2022-09-28 NOTE — TELEPHONE ENCOUNTER
TORSTEN WITH [facility] CALLED TO VERIFY DETAILS DFOR A STAT 6 MIN WALK ORDER AND WHAT DME COMPANY TO USE. WT TO OFFICE

## 2022-09-29 ENCOUNTER — APPOINTMENT (OUTPATIENT)
Dept: LAB | Facility: HOSPITAL | Age: 47
End: 2022-09-29

## 2022-09-29 RX ORDER — TRAZODONE HYDROCHLORIDE 50 MG/1
50 TABLET ORAL NIGHTLY
Qty: 30 TABLET | Refills: 0 | OUTPATIENT
Start: 2022-09-29

## 2022-10-03 ENCOUNTER — SPECIALTY PHARMACY (OUTPATIENT)
Dept: PHARMACY | Facility: HOSPITAL | Age: 47
End: 2022-10-03

## 2022-10-03 DIAGNOSIS — R06.00 DYSPNEA, UNSPECIFIED TYPE: ICD-10-CM

## 2022-10-03 DIAGNOSIS — R06.02 SHORTNESS OF BREATH: ICD-10-CM

## 2022-10-03 DIAGNOSIS — R06.09 DYSPNEA ON EXERTION: Primary | ICD-10-CM

## 2022-10-03 DIAGNOSIS — R09.02 HYPOXIA: ICD-10-CM

## 2022-10-03 NOTE — PROGRESS NOTES
Specialty Pharmacy Refill Coordination Note     Nieves is a 46 y.o. female contacted today regarding refills of hydroxyurea specialty medication(s).     Reviewed and verified with patient:       Specialty medication(s) and dose(s) confirmed: yes    Refill Questions    Flowsheet Row Most Recent Value   Changes to allergies? No   Changes to medications? No   New conditions since last clinic visit No   Unplanned office visit, urgent care, ED, or hospital admission in the last 4 weeks  No   How does patient/caregiver feel medication is working? Good   Financial problems or insurance changes  No   Since the previous refill, were any specialty medication doses or scheduled injections missed or delayed?  No   Does this patient require a clinical escalation to a pharmacist? No          Delivery Questions    Flowsheet Row Most Recent Value   Delivery method [employer]   Delivery address correct? No   Delivery phone number [phone]   Preferred delivery time? Anytime   Number of medications in delivery 1   Medication being filled and delivered hydrea   Doses left of specialty medications a few   Is there any medication that is due not being filled? No   Supplies needed? No supplies needed   Cooler needed? No   Copay form of payment Payment plan already set up   Questions or concerns for the pharmacist? No   Explain any questions or concerns for the pharmacist NA   Are any medications first time fills? No          Ship on Tuesday 10/4/22 to arrive Wednesday 10/5/22.     Follow-up: 3 week(s)     Jyotsna Anton, Pharmacy Technician  Specialty Pharmacy Technician

## 2022-10-07 ENCOUNTER — SPECIALTY PHARMACY (OUTPATIENT)
Dept: PHARMACY | Facility: HOSPITAL | Age: 47
End: 2022-10-07

## 2022-10-07 NOTE — PROGRESS NOTES
Specialty Pharmacy Note: Gleevec    Gleevec on hold until sees MD.  Appointment is 10/20/22.  Patient reports she was not feeling well and having sores on her body, including her genital area.  She is aware to hold Gleevec and pharmacy knows to not fill until after appointment.      Thanks,    Ros CALHOUN, PharmD

## 2022-10-11 NOTE — PROGRESS NOTES
Hematology/Oncology Outpatient Follow Up    PATIENT NAME:Nieves Mc  :1975  MRN: [id number]  PRIMARY CARE PHYSICIAN: Alida Latham APRN  REFERRING PHYSICIAN: Alida Latham, *    Chief Complaint   Patient presents with   • Follow-up     CML (chronic myelocytic leukemia)         HISTORY OF PRESENT ILLNESS:      Patient is a 47 y.o. female with PMH significant for CML on treatment with Imatinib.  Patient stated that she typically feels poorly with Imatinib with frequent nausea and vomiting.  Also complained of weakness and fatigue.  Patient presented to ED on 10/22/18 with complaints of an elevated blood sugar around 400.  Stated she felt poorly and has had a productive cough with yellow sputum.  Complained of nausea and stated she was unable to keep any food down anytime she ate.  The patient reported subjective fever without chills.  She stated she had been coughing so hard that she was vomiting.  She denied hematemesis.  Denied diarrhea, constipation or blood in her stool.       Patient’s chest x-ray showed no evidence of acute pulmonary embolus.  The patient has ground-glass opacities throughout the lungs.  There is some air trapping noted which would appear to be   infectious.  Patient was started on antibiotics.      Hem/Onc was consulted on 10/23/18 for co-management of patient with CML.  Patient was seen by Dr. Lerma on 10/12 on previous admission for patient reported CML on Imatinib (Gleevec).  Patient reports she is under the care of Dr. Roach at [facility] in [place].  Patient was seen by Dr. Lerma in May 2018 when patient presented with hematuria, which was attributed to her Imatinib.  Dr. Lerma followed during hospitalization but then patient returned to Dr. Roach for her usual follow up.  She was again seen on 10/12.  Patient is stating she will switch to Dr. Lerma.    · Review of Dr. Roach’s note:  On 18 patient had BCR-abl which was 61.326.  Patient  had been on Imatinib 400 mg daily.  She had presented in March 2018 with WBC of 24, hemoglobin 13.1, platelet count of 1,067,000.  Patient apparently had follow up BCR-abl in August 2018, results are not available for review.  Her bone marrow aspiration and biopsy was also performed at that time is currently not available for review.    · 11/6/18 - .  Uric acid 6.5.  BCR-abl by RT-PCR was measured at 3.36%.    · Due to thrombocytosis, patient was placed on Hydroxyurea 500 mg twice a day on 12/11/18.  Platelet count juana to 900,000.  Patient was noncompliant with CBCs that were recommended.    Patient was asked to return to the office after not being able to reach her.   · 12/27/18 - Bone marrow aspiration and biopsy was consistent with chronic myeloid leukemia.  BCR-abl positive, chronic phase.  ABL kinase mutational analysis is currently pending on the bone marrow.    · 1/3/19 - Repeat CBC, WBC 17, hemoglobin 11.9, platelet count 1,072,000.  Hydroxyurea was increased to 1 gm twice a day with follow up CBC.    · 1/6/19 - Follow up CBC showed progressive increase in platelet to 1.1 million.  Patient was offered admission to the hospital, which she declined.  Hydroxyurea was increased to 1 gm three   times a day as of 1/6/19.   · 1/7/19 - EKG shows sinus tachycardia.   milliseconds.   ·  ABL kinase on peripheral blood was ordered on 1/11/19.  Based on sequence analysis, there was no mutation detected.    · 2/12/19 - Patient was initiated on Tasigna 300 mg twice a day.  · 2/13/19 - Urinalysis was negative for hematuria.   · 2/22/19 -  milliseconds.  · 3/13/19 - EKG with QTC is 413 milliseconds.  Nonspecific changes noted.    · 4/18/19 - EKG showed QTC prolonged to 453 milliseconds.  This is changed from prior.  · 4/18/19 - Free T4 normal at 0.91.  Magnesium was 1.7.  BUN is 6.  Creatinine 0.7.  Bilirubin 2.2.  Phosphorous normal 3.7.  TSH 0.43, normal.  Free T3 was normal at 3.47.  Ionized  calcium   normal at 1.23.  BCR-abl was 0.522%.    · 19 - EKG showed QTC of 441 milliseconds.  QT was 366.     · Patient has been lost to follow-up since 2019 for CML.  Patient states that she lost her insurance.  She also does not have any primary care physician at this time.  She is diabetic on insulin.  · Patient was recently admitted to the hospital for pneumonia due to COVID-19 infection.  At that time patient made a decision to become compliant with treatment.  She is currently on to Cigna 300 mg p.o. twice a day.  She is also on hydroxyurea 1 g twice a day.  Overall she feels better, she has more energy.  · 3/1/2022 white count is 53.92, hemoglobin 11.7 and platelets are 472    · 2022: Patient has not been seen since 2022.  Also verified that she has not been taking to Tasigna since 2022.  She comes in today with cough for 1 and half months, shortness of breath, denies fevers.  · 2022 patient had 2D echocardiogram which showed an EF of 41 to 45%.  Left ventricular cavity is mildly dilated.  She was diagnosed with nonischemic cardiomyopathy  · 2022: Patient started on imatinib 400 mg daily.  Continued on hydroxyurea 1 g twice a day  · 10/7/2022: Gleevec held due to reports of rash.    Past Medical History:   Diagnosis Date   • Bone pain    • COVID-19 virus infection 2022   • Diabetes mellitus (HCC)    • Extremity pain     Carlin. legs pain   • Leg pain     left leg greater   • Migraine    • Pulmonary embolism (HCC)    • Vision loss     doing surgery       Past Surgical History:   Procedure Laterality Date   • BONE MARROW BIOPSY     • BREAST SURGERY     • BRONCHOSCOPY N/A 2022    Procedure: BRONCHOSCOPY bilateral lung washing;  Surgeon: Charlene Camara MD;  Location: [facility] ENDOSCOPY;  Service: Pulmonary;  Laterality: N/A;  post: rule out infection vs transfusion lung injury   •  SECTION     • CHOLECYSTECTOMY     • EYE SURGERY      laser surgery due  to hemmorage---  5/13/2021-- another surgery  lt eye11/15/21   • RETINAL DETACHMENT SURGERY     • SPINE SURGERY      Lombardi spinal block   • TUBAL ABDOMINAL LIGATION           Current Outpatient Medications:   •  allopurinol (ZYLOPRIM) 100 MG tablet, Take 1 tablet by mouth Daily., Disp: 30 tablet, Rfl: 0  •  ALPRAZolam (Xanax) 0.25 MG tablet, Take 1 tablet by mouth At Night As Needed for Anxiety., Disp: 30 tablet, Rfl: 0  •  budesonide-formoterol (SYMBICORT) 160-4.5 MCG/ACT inhaler, Inhale 2 puffs 2 (Two) Times a Day., Disp: 10.2 g, Rfl: 1  •  citalopram (CeleXA) 10 MG tablet, Take 1 tablet by mouth Daily. To begin 1/31/22, Disp: 30 tablet, Rfl: 2  •  folic acid (FOLVITE) 1 MG tablet, Take 1 tablet by mouth Daily., Disp: 30 tablet, Rfl: 0  •  guaiFENesin (MUCINEX) 600 MG 12 hr tablet, Take 1 tablet by mouth Every 12 (Twelve) Hours., Disp: 30 tablet, Rfl: 0  •  hydroxyurea (HYDREA) 500 MG capsule, Take 2 capsules by mouth 2 (Two) Times a Day., Disp: 120 capsule, Rfl: 0  •  Insulin Glargine (BASAGLAR KWIKPEN) 100 UNIT/ML injection pen, Inject 20 Units under the skin into the appropriate area as directed Daily., Disp: 10 mL, Rfl: 3  •  Insulin Lispro (ADMELOG SOLOSTAR) 100 UNIT/ML injection pen, Inject 6 Units under the skin into the appropriate area as directed 3 (Three) Times a Day., Disp: 3 mL, Rfl: 3  •  Insulin Lispro (ADMELOG SOLOSTAR) 100 UNIT/ML injection pen, Inject  6 units under the skin into the appropriate area three times daily as directed., Disp: 15 mL, Rfl: 0  •  losartan (COZAAR) 25 MG tablet, Take 1 tablet by mouth Daily., Disp: 30 tablet, Rfl: 0  •  metoprolol succinate XL (TOPROL-XL) 25 MG 24 hr tablet, Take 25 mg by mouth Daily., Disp: , Rfl:   •  mirtazapine (REMERON) 15 MG tablet, Take 1 tablet by mouth Every Night for 30 days. Start Mirtazapine and decrease Trazodone to 1/2 tablet x 14 days and then stop Trazodone., Disp: 30 tablet, Rfl: 2  •  ondansetron ODT (ZOFRAN-ODT) 8 MG disintegrating tablet, Place 1  tablet on the tongue Every 8 (Eight) Hours As Needed for Nausea or Vomiting., Disp: 20 tablet, Rfl: 2  •  oxyCODONE-acetaminophen (PERCOCET)  MG per tablet, Take 1 tablet by mouth Every 8 (Eight) Hours As Needed for Moderate Pain., Disp: 90 tablet, Rfl: 0  •  prochlorperazine (COMPAZINE) 25 MG suppository, Insert 1 suppository into the rectum Every 12 (Twelve) Hours As Needed for Nausea or Vomiting., Disp: 14 suppository, Rfl: 2  •  rivaroxaban (XARELTO) 20 MG tablet, Take 1 tablet by mouth Daily., Disp: 90 tablet, Rfl: 0  •  hydrOXYzine (ATARAX) 25 MG tablet, Take 1 tablet by mouth Every 6 (Six) Hours As Needed for Itching., Disp: 20 tablet, Rfl: 0  •  predniSONE (DELTASONE) 10 MG tablet, Take 2 tablets by mouth Take As Directed. 20 mg BID x 5 days then 20 mg daily x 3 days then 10 mg daily x 3 days, then stop, Disp: 16 tablet, Rfl: 0  •  triamcinolone (KENALOG) 0.1 % ointment, Apply 1 application topically to the appropriate area as directed 2 (Two) Times a Day for 14 days., Disp: 30 g, Rfl: 0    Allergies   Allergen Reactions   • Hydromorphone GI Intolerance     dilaudid   • Morphine Nausea And Vomiting   • Propofol Hives     Previously tolerated being premedicated with diphenhydramine and famotadine       Family History   Problem Relation Age of Onset   • Diabetes Mother    • Diabetes Maternal Grandmother    • Heart attack Maternal Grandmother    • Stroke Maternal Grandmother        Cancer-related family history is not on file.    Social History     Tobacco Use   • Smoking status: Never   • Smokeless tobacco: Never   Vaping Use   • Vaping Use: Never used   Substance Use Topics   • Alcohol use: No   • Drug use: No       HPI, ROS and PFSH have been reviewed and confirmed on 10/12/2022.     SUBJECTIVE:  Patient here due to continued pruritic rash.  Her Gleevec has been on hold since 10/7/2022.  She has areas of rash on the left inner thigh, right upper arm, and back.  Rash areas consist of 1-3 isolated  "papular dark red lesions.  They first started out as very small skin colored papules per patient report.  The patient continues to have other complaints at her normal baseline such as shortness of breath and cough, nausea and vomiting, insomnia.          REVIEW OF SYSTEMS:  Review of Systems   Constitutional: Negative for chills and fever.   HENT: Negative for ear pain, mouth sores, nosebleeds and sore throat.    Eyes: Negative for photophobia and visual disturbance.   Respiratory: Negative for wheezing and stridor. Positive for SOA and Cough.  Cardiovascular: Negative for chest pain and palpitations.   Gastrointestinal: Negative for diarrhea, Positive for nausea and vomiting.   Endocrine: Negative for cold intolerance and heat intolerance.   Genitourinary: Negative for dysuria and hematuria.   Musculoskeletal: Negative for joint swelling and neck stiffness.   Skin: Negative for color change and rash. Skin lesions  Neurological: Negative for seizures and syncope.   Hematological: Negative for adenopathy.        No obvious bleeding   Psychiatric/Behavioral: Negative for agitation, confusion and hallucinations.       OBJECTIVE:    Vitals:    10/12/22 1043   BP: 128/81   Pulse: 102   Temp: 97.1 °F (36.2 °C)   SpO2: 95%   Weight: 56.2 kg (124 lb)  Comment: verbal   Height: 162.6 cm (64\")   PainSc:   8   PainLoc: Generalized     Body mass index is 21.28 kg/m².    ECOG  (1) Restricted in physically strenuous activity, ambulatory and able to do work of light nature    Physical Exam  Vitals and nursing note reviewed.   Constitutional:       General: She is not in acute distress.     Appearance: She is not diaphoretic.   HENT:      Head: Normocephalic and atraumatic.   Eyes:      General: No scleral icterus.        Right eye: No discharge.         Left eye: No discharge.      Conjunctiva/sclera: Conjunctivae normal.   Neck:      Thyroid: No thyromegaly.   Cardiovascular:      Rate and Rhythm: Normal rate and regular rhythm. "      Heart sounds: Normal heart sounds. No friction rub. No gallop.     Right lower extremity edema, non-pitting  Pulmonary:      Effort: Pulmonary effort is normal. No respiratory distress.      Breath sounds: Lungs clear to auscultation.  No stridor. No wheezing.   Abdominal:      General: Bowel sounds are normal.      Palpations: Abdomen is soft. There is no mass.      Tenderness: There is no abdominal tenderness. There is no guarding or rebound.   Musculoskeletal:         General: No tenderness. Normal range of motion.      Cervical back: Normal range of motion and neck supple.   Lymphadenopathy:      Cervical: No cervical adenopathy.   Skin:     General: Skin is warm.      Findings: No erythema.. Papular skin lesions to the left upper thigh with no signs of infection.  Small flesh-colored papule to see midline upper back and right upper extremity.  Neurological:      Mental Status: She is alert and oriented to person, place, and time.      Motor: No abnormal muscle tone.   Psychiatric:         Behavior: Behavior normal.     I have reexamined the patient and the results are consistent with the previously documented exam. JP Varela     RECENT LABS    WBC   Date Value Ref Range Status   10/12/2022 224.62 (C) 3.40 - 10.80 10*3/mm3 Final     RBC   Date Value Ref Range Status   10/12/2022 3.53 (L) 3.77 - 5.28 10*6/mm3 Final     Hemoglobin   Date Value Ref Range Status   10/12/2022 9.1 (L) 12.0 - 15.9 g/dL Final     Hematocrit   Date Value Ref Range Status   10/12/2022 29.7 (L) 34.0 - 46.6 % Final     MCV   Date Value Ref Range Status   10/12/2022 84.1 79.0 - 97.0 fL Final     MCH   Date Value Ref Range Status   10/12/2022 25.8 (L) 26.6 - 33.0 pg Final     MCHC   Date Value Ref Range Status   10/12/2022 30.6 (L) 31.5 - 35.7 g/dL Final     RDW   Date Value Ref Range Status   10/12/2022 17.7 (H) 12.3 - 15.4 % Final     RDW-SD   Date Value Ref Range Status   10/12/2022 50.3 37.0 - 54.0 fl Final     MPV    Date Value Ref Range Status   10/12/2022 9.7 6.0 - 12.0 fL Final     Platelets   Date Value Ref Range Status   10/12/2022 271 140 - 450 10*3/mm3 Final     Neutrophil %   Date Value Ref Range Status   10/16/2019 60.0 42.7 - 76.0 % Final     Lymphocyte %   Date Value Ref Range Status   10/16/2019 32.1 19.6 - 45.3 % Final     Monocyte %   Date Value Ref Range Status   09/15/2022 12.4 (H) 5.0 - 12.0 % Final     Eosinophil %   Date Value Ref Range Status   09/15/2022 4.5 0.3 - 6.2 % Final     Basophil %   Date Value Ref Range Status   10/16/2019 1.3 0.0 - 1.5 % Final     Neutrophils Absolute   Date Value Ref Range Status   08/05/2022 70.25 (H) 1.70 - 7.00 10*3/mm3 Final     Neutrophils, Absolute   Date Value Ref Range Status   10/16/2019 5.60 1.70 - 7.00 10*3/mm3 Final     Lymphocytes, Absolute   Date Value Ref Range Status   10/16/2019 3.00 0.70 - 3.10 10*3/mm3 Final     Monocytes, Absolute   Date Value Ref Range Status   09/15/2022 19.67 (H) 0.10 - 0.90 10*3/mm3 Final     Eosinophils, Absolute   Date Value Ref Range Status   09/15/2022 7.23 (H) 0.00 - 0.40 10*3/mm3 Final     Basophils Absolute   Date Value Ref Range Status   08/05/2022 12.46 (H) 0.00 - 0.20 10*3/mm3 Final     Basophils, Absolute   Date Value Ref Range Status   10/16/2019 0.10 0.00 - 0.20 10*3/mm3 Final     nRBC   Date Value Ref Range Status   08/04/2022 2.0 (H) 0.0 - 0.2 /100 WBC Final   10/16/2019 0.2 0.0 - 0.2 /100 WBC Final       Lab Results   Component Value Date    GLUCOSE 206 (H) 09/22/2022    BUN 11 09/22/2022    CREATININE 0.71 09/22/2022    EGFRIFNONA 133 02/02/2022    BCR 15.5 09/22/2022    K 3.3 (L) 09/22/2022    CO2 25.0 09/22/2022    CALCIUM 8.9 09/22/2022    ALBUMIN 3.70 09/22/2022    LABIL2 1.0 04/18/2019    AST 18 09/22/2022    ALT 18 09/22/2022           ASSESSMENT    · CML not in remission  · Noncompliant with TKI's for her CML  · Recent cardiomyopathy diagnosis with EF around 45%, nonischemic.  Follows up with Dr. Ruiz with  cardiology services  · We will get pharmacist involved to see which TKI may be most appropriate for her given her cardiomyopathy.  After pharmacist review, plan is for Gleevec 400 mg po daily-d/w Dr. Lerma  · CML in chronic phase with no significant hematologic or molecular or cytogenetic response on   Imatinib.  ABL kinase mutational analysis was negative.  We  have represcribed to Tasigna 300 mg twice a day.  Patient has been noncompliant with treatments and ended up in the hospital with hyperleukocytosis, peripheral blood blasts.  Bone marrow biopsy suggest that she remains in chronic phase.  Continue to Tasiigna at the current doses.  Hydroxyurea has been discontinued.  · Uncontrolled diabetes type 1, followed at the [facility] by Dr. Calles  · Chronic anemia: Stable, multifactorial from CML, to Tasigna, hydroxyurea.  This has improved  · Insomnia.  On trazodone.  Not helpful.  Will increase mirtazapine to 15 mg p.o. nightly  · Situational anxiety, not suicidal, Zoloft is not helping. Weaned off Elavil since anxiety not improved. On Celexa 10 mg p.o. every morning and Xanax in the evening PRN  ·  Hyperleukocytosis secondary to CML  · History of medical noncompliance  · Pulmonary embolism: Xarelto restarted  · Recent COVID-19 pneumonia  · History of prolonged QTC.  Resolved on most recent EKG.   · Chronic pain.  Managed per pain management physician.  · Pneumonia/Hx of pneumonitis/hx of ARF:  CXR showed improvement from June 2022, but some residual.  Completed Augmentin and doxycycline for treatment of pneumonia.  Lungs clear to auscultation today  · Right lower extremity edema        Plans    · Continue to hold Gleevec and treat rash with oral and topical steroid and antihistamine for itching  · Sent for stat right lower extremity venous Doppler ultrasound to rule out DVT  · Reviewed CBC with patient, white blood cell count elevated, hemoglobin and platelets stable  · Continue hydroxyurea 1 g  twice a day  · Skin infection improving on doxycycline today, did not receive mupirocin from pharmacy so will call back in.  · Increase mirtazapine to 15 mg p.o. nightly for insomnia  · Continue antiemetics and GI consult  · GI consult for continued nausea and vomiting (this was present prior to TKI)  · Patient failed 6 minute walk-Home O2 ordered.  Diagnosis of hypoxia related to cardiomyopathy, CHF, pneumonia, CML, hx. Of pneumonitis and ARF.  Patient was unable to complete the 6-minute walk in her home and is being set up for an overnight oximetry.  · Weekly CBC and CMP for now  · Last EKG showed normal QT  · Continue Xanax 0.25 mg p.o. nightly as needed number 30 pills.   · All questions answered  · Follow-up with NP in 1 week  · Advised close monitoring of her glucose levels while on oral steroids         I spent 40 total minutes, face-to-face, caring for Hope today.  90% of this time involved counseling and/or coordination of care as documented within this note.

## 2022-10-12 ENCOUNTER — DOCUMENTATION (OUTPATIENT)
Dept: ONCOLOGY | Facility: CLINIC | Age: 47
End: 2022-10-12

## 2022-10-12 ENCOUNTER — APPOINTMENT (OUTPATIENT)
Dept: ULTRASOUND IMAGING | Facility: HOSPITAL | Age: 47
End: 2022-10-12

## 2022-10-12 ENCOUNTER — APPOINTMENT (OUTPATIENT)
Dept: LAB | Facility: HOSPITAL | Age: 47
End: 2022-10-12

## 2022-10-12 ENCOUNTER — OFFICE VISIT (OUTPATIENT)
Dept: ONCOLOGY | Facility: CLINIC | Age: 47
End: 2022-10-12

## 2022-10-12 VITALS
WEIGHT: 124 LBS | TEMPERATURE: 97.1 F | SYSTOLIC BLOOD PRESSURE: 128 MMHG | OXYGEN SATURATION: 95 % | BODY MASS INDEX: 21.17 KG/M2 | HEIGHT: 64 IN | DIASTOLIC BLOOD PRESSURE: 81 MMHG | HEART RATE: 102 BPM

## 2022-10-12 DIAGNOSIS — F41.8 DEPRESSION WITH ANXIETY: ICD-10-CM

## 2022-10-12 DIAGNOSIS — R60.0 EDEMA OF RIGHT LOWER LEG: ICD-10-CM

## 2022-10-12 DIAGNOSIS — C92.10 CML (CHRONIC MYELOCYTIC LEUKEMIA): Primary | ICD-10-CM

## 2022-10-12 LAB
ALBUMIN SERPL-MCNC: 3.6 G/DL (ref 3.5–5.2)
ALBUMIN/GLOB SERPL: 1 G/DL
ALP SERPL-CCNC: 1722 U/L (ref 39–117)
ALT SERPL W P-5'-P-CCNC: 14 U/L (ref 1–33)
ANION GAP SERPL CALCULATED.3IONS-SCNC: 12 MMOL/L (ref 5–15)
AST SERPL-CCNC: 39 U/L (ref 1–32)
BASOPHILS NFR BLD AUTO: ABNORMAL %
BILIRUB SERPL-MCNC: 1.6 MG/DL (ref 0–1.2)
BUN SERPL-MCNC: 10 MG/DL (ref 6–20)
BUN/CREAT SERPL: 12.8 (ref 7–25)
CALCIUM SPEC-SCNC: 9 MG/DL (ref 8.6–10.5)
CHLORIDE SERPL-SCNC: 98 MMOL/L (ref 98–107)
CO2 SERPL-SCNC: 27 MMOL/L (ref 22–29)
CREAT SERPL-MCNC: 0.78 MG/DL (ref 0.57–1)
DEPRECATED RDW RBC AUTO: 50.3 FL (ref 37–54)
EGFRCR SERPLBLD CKD-EPI 2021: 94.4 ML/MIN/1.73
EOSINOPHIL NFR BLD AUTO: ABNORMAL %
ERYTHROCYTE [DISTWIDTH] IN BLOOD BY AUTOMATED COUNT: 17.7 % (ref 12.3–15.4)
GLOBULIN UR ELPH-MCNC: 3.6 GM/DL
GLUCOSE SERPL-MCNC: 248 MG/DL (ref 65–99)
HCT VFR BLD AUTO: 29.7 % (ref 34–46.6)
HGB BLD-MCNC: 9.1 G/DL (ref 12–15.9)
HOLD SPECIMEN: NORMAL
LYMPHOCYTES NFR BLD AUTO: ABNORMAL %
MCH RBC QN AUTO: 25.8 PG (ref 26.6–33)
MCHC RBC AUTO-ENTMCNC: 30.6 G/DL (ref 31.5–35.7)
MCV RBC AUTO: 84.1 FL (ref 79–97)
MONOCYTES NFR BLD AUTO: ABNORMAL %
NEUTROPHILS NFR BLD AUTO: ABNORMAL %
PLATELET # BLD AUTO: 271 10*3/MM3 (ref 140–450)
PMV BLD AUTO: 9.7 FL (ref 6–12)
POTASSIUM SERPL-SCNC: 4.1 MMOL/L (ref 3.5–5.2)
PROT SERPL-MCNC: 7.2 G/DL (ref 6–8.5)
RBC # BLD AUTO: 3.53 10*6/MM3 (ref 3.77–5.28)
SODIUM SERPL-SCNC: 137 MMOL/L (ref 136–145)
WBC NRBC COR # BLD: 224.62 10*3/MM3 (ref 3.4–10.8)

## 2022-10-12 PROCEDURE — 80053 COMPREHEN METABOLIC PANEL: CPT | Performed by: NURSE PRACTITIONER

## 2022-10-12 PROCEDURE — 99215 OFFICE O/P EST HI 40 MIN: CPT | Performed by: NURSE PRACTITIONER

## 2022-10-12 PROCEDURE — 36415 COLL VENOUS BLD VENIPUNCTURE: CPT

## 2022-10-12 PROCEDURE — 85025 COMPLETE CBC W/AUTO DIFF WBC: CPT | Performed by: NURSE PRACTITIONER

## 2022-10-12 RX ORDER — PREDNISONE 10 MG/1
20 TABLET ORAL TAKE AS DIRECTED
Qty: 16 TABLET | Refills: 0 | Status: SHIPPED | OUTPATIENT
Start: 2022-10-12 | End: 2022-11-03

## 2022-10-12 RX ORDER — HYDROXYZINE HYDROCHLORIDE 25 MG/1
25 TABLET, FILM COATED ORAL EVERY 6 HOURS PRN
Qty: 20 TABLET | Refills: 0 | Status: SHIPPED | OUTPATIENT
Start: 2022-10-12 | End: 2022-11-17 | Stop reason: HOSPADM

## 2022-10-12 RX ORDER — MIRTAZAPINE 15 MG/1
15 TABLET, FILM COATED ORAL NIGHTLY
Qty: 30 TABLET | Refills: 2 | Status: SHIPPED | OUTPATIENT
Start: 2022-10-12 | End: 2022-12-07 | Stop reason: SDUPTHER

## 2022-10-12 NOTE — PROGRESS NOTES
"Received a call from Lea at the  stating that the pt fell on the sidewalk after she had been checked out from her appt. I went to the [facility] and the pt was sitting in a wheelchair with a gentleman sitting on a chair behind her. I asked if she was hurt and she stated that she \"just wants to go home.\" I told her that an x-ray might be needed to check for further injury. She stated that she did not want to have an x-ray done and also does not want to have the doppler done that JP Denny ordered. I told her that it was her choice whether to have them done or not and that we could not force her to do anything she does not want to do. I advised that she let us know if she develops pain or changes her mind about having the x-ray or the doppler. Pt verbalized understanding and asked the gentleman to take her home.  "

## 2022-10-18 DIAGNOSIS — R11.2 NAUSEA AND VOMITING, UNSPECIFIED VOMITING TYPE: Primary | ICD-10-CM

## 2022-10-18 NOTE — PROGRESS NOTES
Hematology/Oncology Outpatient Follow Up    PATIENT NAME:Nieves Mc  :1975  MRN: [id number]  PRIMARY CARE PHYSICIAN: Alida Latham APRN  REFERRING PHYSICIAN: Alida Latham, *    Chief Complaint   Patient presents with   • Follow-up     CML (chronic myelocytic leukemia) (HCC)        HISTORY OF PRESENT ILLNESS:      Patient is a 47 y.o. female with PMH significant for CML on treatment with Imatinib.  Patient stated that she typically feels poorly with Imatinib with frequent nausea and vomiting.  Also complained of weakness and fatigue.  Patient presented to ED on 10/22/18 with complaints of an elevated blood sugar around 400.  Stated she felt poorly and has had a productive cough with yellow sputum.  Complained of nausea and stated she was unable to keep any food down anytime she ate.  The patient reported subjective fever without chills.  She stated she had been coughing so hard that she was vomiting.  She denied hematemesis.  Denied diarrhea, constipation or blood in her stool.       Patient’s chest x-ray showed no evidence of acute pulmonary embolus.  The patient has ground-glass opacities throughout the lungs.  There is some air trapping noted which would appear to be   infectious.  Patient was started on antibiotics.      Hem/Onc was consulted on 10/23/18 for co-management of patient with CML.  Patient was seen by Dr. Lerma on 10/12 on previous admission for patient reported CML on Imatinib (Gleevec).  Patient reports she is under the care of Dr. Roach at [facility] in [place].  Patient was seen by Dr. Lerma in May 2018 when patient presented with hematuria, which was attributed to her Imatinib.  Dr. Lerma followed during hospitalization but then patient returned to Dr. Roach for her usual follow up.  She was again seen on 10/12.  Patient is stating she will switch to Dr. Lerma.    · Review of Dr. Roach’s note:  On 18 patient had BCR-abl which was 61.326.   Patient had been on Imatinib 400 mg daily.  She had presented in March 2018 with WBC of 24, hemoglobin 13.1, platelet count of 1,067,000.  Patient apparently had follow up BCR-abl in August 2018, results are not available for review.  Her bone marrow aspiration and biopsy was also performed at that time is currently not available for review.    · 11/6/18 - .  Uric acid 6.5.  BCR-abl by RT-PCR was measured at 3.36%.    · Due to thrombocytosis, patient was placed on Hydroxyurea 500 mg twice a day on 12/11/18.  Platelet count juana to 900,000.  Patient was noncompliant with CBCs that were recommended.    Patient was asked to return to the office after not being able to reach her.   · 12/27/18 - Bone marrow aspiration and biopsy was consistent with chronic myeloid leukemia.  BCR-abl positive, chronic phase.  ABL kinase mutational analysis is currently pending on the bone marrow.    · 1/3/19 - Repeat CBC, WBC 17, hemoglobin 11.9, platelet count 1,072,000.  Hydroxyurea was increased to 1 gm twice a day with follow up CBC.    · 1/6/19 - Follow up CBC showed progressive increase in platelet to 1.1 million.  Patient was offered admission to the hospital, which she declined.  Hydroxyurea was increased to 1 gm three   times a day as of 1/6/19.   · 1/7/19 - EKG shows sinus tachycardia.   milliseconds.   ·  ABL kinase on peripheral blood was ordered on 1/11/19.  Based on sequence analysis, there was no mutation detected.    · 2/12/19 - Patient was initiated on Tasigna 300 mg twice a day.  · 2/13/19 - Urinalysis was negative for hematuria.   · 2/22/19 -  milliseconds.  · 3/13/19 - EKG with QTC is 413 milliseconds.  Nonspecific changes noted.    · 4/18/19 - EKG showed QTC prolonged to 453 milliseconds.  This is changed from prior.  · 4/18/19 - Free T4 normal at 0.91.  Magnesium was 1.7.  BUN is 6.  Creatinine 0.7.  Bilirubin 2.2.  Phosphorous normal 3.7.  TSH 0.43, normal.  Free T3 was normal at 3.47.  Ionized  calcium   normal at 1.23.  BCR-abl was 0.522%.    · 19 - EKG showed QTC of 441 milliseconds.  QT was 366.     · Patient has been lost to follow-up since 2019 for CML.  Patient states that she lost her insurance.  She also does not have any primary care physician at this time.  She is diabetic on insulin.  · Patient was recently admitted to the hospital for pneumonia due to COVID-19 infection.  At that time patient made a decision to become compliant with treatment.  She is currently on to [employer]a 300 mg p.o. twice a day.  She is also on hydroxyurea 1 g twice a day.  Overall she feels better, she has more energy.  · 3/1/2022 white count is 53.92, hemoglobin 11.7 and platelets are 472    · 2022: Patient has not been seen since 2022.  Also verified that she has not been taking to Tasigna since 2022.  She comes in today with cough for 1 and half months, shortness of breath, denies fevers.  · 2022 patient had 2D echocardiogram which showed an EF of 41 to 45%.  Left ventricular cavity is mildly dilated.  She was diagnosed with nonischemic cardiomyopathy  · 2022: Patient started on imatinib 400 mg daily.  Continued on hydroxyurea 1 g twice a day  · 10/7/2022: Gleevec held due to reports of rash.    Past Medical History:   Diagnosis Date   • Bone pain    • COVID-19 virus infection 2022   • Diabetes mellitus (HCC)    • Extremity pain     Carlin. legs pain   • Leg pain     left leg greater   • Migraine    • Pulmonary embolism (HCC)    • Vision loss     doing surgery       Past Surgical History:   Procedure Laterality Date   • BONE MARROW BIOPSY     • BREAST SURGERY     • BRONCHOSCOPY N/A 2022    Procedure: BRONCHOSCOPY bilateral lung washing;  Surgeon: Charlene Camara MD;  Location: [facility];  Service: Pulmonary;  Laterality: N/A;  post: rule out infection vs transfusion lung injury   •  SECTION     • CHOLECYSTECTOMY     • EYE SURGERY      laser surgery due  to hemmorage---  5/13/2021-- another surgery  lt eye11/15/21   • RETINAL DETACHMENT SURGERY     • SPINE SURGERY      Lombardi spinal block   • TUBAL ABDOMINAL LIGATION           Current Outpatient Medications:   •  allopurinol (ZYLOPRIM) 100 MG tablet, Take 1 tablet by mouth Daily., Disp: 30 tablet, Rfl: 0  •  ALPRAZolam (Xanax) 0.25 MG tablet, Take 1 tablet by mouth At Night As Needed for Anxiety., Disp: 30 tablet, Rfl: 0  •  budesonide-formoterol (SYMBICORT) 160-4.5 MCG/ACT inhaler, Inhale 2 puffs 2 (Two) Times a Day., Disp: 10.2 g, Rfl: 1  •  citalopram (CeleXA) 10 MG tablet, Take 1 tablet by mouth Daily. To begin 1/31/22, Disp: 30 tablet, Rfl: 2  •  folic acid (FOLVITE) 1 MG tablet, Take 1 tablet by mouth Daily., Disp: 30 tablet, Rfl: 0  •  guaiFENesin (MUCINEX) 600 MG 12 hr tablet, Take 1 tablet by mouth Every 12 (Twelve) Hours., Disp: 30 tablet, Rfl: 0  •  hydroxyurea (HYDREA) 500 MG capsule, Take 2 capsules by mouth 2 (Two) Times a Day., Disp: 120 capsule, Rfl: 0  •  hydrOXYzine (ATARAX) 25 MG tablet, Take 1 tablet by mouth Every 6 (Six) Hours As Needed for Itching., Disp: 20 tablet, Rfl: 0  •  Insulin Glargine (BASAGLAR KWIKPEN) 100 UNIT/ML injection pen, Inject 20 Units under the skin into the appropriate area as directed Daily., Disp: 10 mL, Rfl: 3  •  Insulin Lispro (ADMELOG SOLOSTAR) 100 UNIT/ML injection pen, Inject 6 Units under the skin into the appropriate area as directed 3 (Three) Times a Day., Disp: 3 mL, Rfl: 3  •  Insulin Lispro (ADMELOG SOLOSTAR) 100 UNIT/ML injection pen, Inject  6 units under the skin into the appropriate area three times daily as directed., Disp: 15 mL, Rfl: 0  •  losartan (COZAAR) 25 MG tablet, Take 1 tablet by mouth Daily., Disp: 30 tablet, Rfl: 0  •  metoprolol succinate XL (TOPROL-XL) 25 MG 24 hr tablet, Take 25 mg by mouth Daily., Disp: , Rfl:   •  mirtazapine (REMERON) 15 MG tablet, Take 1 tablet by mouth Every Night for 30 days. Start Mirtazapine and decrease Trazodone to 1/2 tablet x  14 days and then stop Trazodone., Disp: 30 tablet, Rfl: 2  •  ondansetron ODT (ZOFRAN-ODT) 8 MG disintegrating tablet, Place 1 tablet on the tongue Every 8 (Eight) Hours As Needed for Nausea or Vomiting., Disp: 20 tablet, Rfl: 2  •  oxyCODONE-acetaminophen (PERCOCET)  MG per tablet, Take 1 tablet by mouth Every 8 (Eight) Hours As Needed for Moderate Pain., Disp: 90 tablet, Rfl: 0  •  predniSONE (DELTASONE) 10 MG tablet, Take 2 tablets by mouth Take As Directed. 20 mg BID x 5 days then 20 mg daily x 3 days then 10 mg daily x 3 days, then stop, Disp: 16 tablet, Rfl: 0  •  prochlorperazine (COMPAZINE) 25 MG suppository, Insert 1 suppository into the rectum Every 12 (Twelve) Hours As Needed for Nausea or Vomiting., Disp: 14 suppository, Rfl: 2  •  rivaroxaban (XARELTO) 20 MG tablet, Take 1 tablet by mouth Daily., Disp: 90 tablet, Rfl: 0  •  triamcinolone (KENALOG) 0.1 % ointment, Apply 1 application topically to the appropriate area as directed 2 (Two) Times a Day for 14 days., Disp: 30 g, Rfl: 0    Allergies   Allergen Reactions   • Hydromorphone GI Intolerance     dilaudid   • Morphine Nausea And Vomiting   • Propofol Hives     Previously tolerated being premedicated with diphenhydramine and famotadine       Family History   Problem Relation Age of Onset   • Diabetes Mother    • Diabetes Maternal Grandmother    • Heart attack Maternal Grandmother    • Stroke Maternal Grandmother        Cancer-related family history is not on file.    Social History     Tobacco Use   • Smoking status: Never   • Smokeless tobacco: Never   Vaping Use   • Vaping Use: Never used   Substance Use Topics   • Alcohol use: No   • Drug use: No       HPI, ROS and PFSH have been reviewed and confirmed on 10/19/2022.     SUBJECTIVE:  Patient here for follow-up of her skin rash for which Gleevec has been on hold.  She has completed her steroids and her rash is almost completely resolved.  She reports no more itching.  She does report increased  "pain in her abdomen due to enlarged spleen and increased nausea and vomiting due to the same.  Her other complaints remained normal baseline-cough, shortness of breath, insomnia.          REVIEW OF SYSTEMS:  Review of Systems   Constitutional: Negative for chills and fever.   HENT: Negative for ear pain, mouth sores, nosebleeds and sore throat.    Eyes: Negative for photophobia and visual disturbance.   Respiratory: Negative for wheezing and stridor. Positive for SOA and Cough.  Cardiovascular: Negative for chest pain and palpitations.   Gastrointestinal: Negative for diarrhea, Positive for nausea and vomiting.   Endocrine: Negative for cold intolerance and heat intolerance.   Genitourinary: Negative for dysuria and hematuria.   Musculoskeletal: Negative for joint swelling and neck stiffness.   Skin: Negative for color change and rash. Skin lesions  Neurological: Negative for seizures and syncope.   Hematological: Negative for adenopathy.        No obvious bleeding   Psychiatric/Behavioral: Negative for agitation, confusion and hallucinations.       OBJECTIVE:    Vitals:    10/19/22 1028   BP: 127/78   Pulse: 103   Temp: 98.1 °F (36.7 °C)   TempSrc: Oral   SpO2: 95%   Weight: 56.2 kg (123 lb 14.4 oz)   Height: 162.6 cm (64\")   PainSc: 10-Worst pain ever   PainLoc: Generalized  Comment: spleen     Body mass index is 21.27 kg/m².    ECOG  (1) Restricted in physically strenuous activity, ambulatory and able to do work of light nature    Physical Exam  Vitals and nursing note reviewed.   Constitutional:       General: She is not in acute distress.     Appearance: She is not diaphoretic.   HENT:      Head: Normocephalic and atraumatic.   Eyes:      General: No scleral icterus.        Right eye: No discharge.         Left eye: No discharge.      Conjunctiva/sclera: Conjunctivae normal.   Neck:      Thyroid: No thyromegaly.   Cardiovascular:      Rate and Rhythm: Normal rate and regular rhythm.      Heart sounds: Normal " heart sounds. No friction rub. No gallop.    Pulmonary:      Effort: Pulmonary effort is normal. No respiratory distress.      Breath sounds: Lungs clear to auscultation.  No stridor. No wheezing.   Abdominal:      General: Bowel sounds are normal.      Palpations: Abdomen is soft. There is no mass.      Tenderness: There is no abdominal tenderness. There is no guarding or rebound.   Musculoskeletal:         General: No tenderness. Normal range of motion.      Cervical back: Normal range of motion and neck supple.   Lymphadenopathy:      Cervical: No cervical adenopathy.   Skin:     General: Skin is warm.      Findings: No erythema.. Papular skin lesions to the left upper thigh with no signs of infection-scabbed over and healing.  Small flesh-colored papule to see midline upper back and right upper extremity.  Neurological:      Mental Status: She is alert and oriented to person, place, and time.      Motor: No abnormal muscle tone.   Psychiatric:         Behavior: Behavior normal.     I have reexamined the patient and the results are consistent with the previously documented exam. Denisha Gill, APRN     RECENT LABS    WBC   Date Value Ref Range Status   10/19/2022 257.61 (C) 3.40 - 10.80 10*3/mm3 Final     RBC   Date Value Ref Range Status   10/19/2022 3.18 (L) 3.77 - 5.28 10*6/mm3 Final     Hemoglobin   Date Value Ref Range Status   10/19/2022 8.2 (L) 12.0 - 15.9 g/dL Final     Hematocrit   Date Value Ref Range Status   10/19/2022 26.6 (L) 34.0 - 46.6 % Final     MCV   Date Value Ref Range Status   10/19/2022 83.6 79.0 - 97.0 fL Final     MCH   Date Value Ref Range Status   10/19/2022 25.8 (L) 26.6 - 33.0 pg Final     MCHC   Date Value Ref Range Status   10/19/2022 30.8 (L) 31.5 - 35.7 g/dL Final     RDW   Date Value Ref Range Status   10/19/2022 17.5 (H) 12.3 - 15.4 % Final     RDW-SD   Date Value Ref Range Status   10/19/2022 49.5 37.0 - 54.0 fl Final     MPV   Date Value Ref Range Status   10/19/2022  10.5 6.0 - 12.0 fL Final     Platelets   Date Value Ref Range Status   10/19/2022 181 140 - 450 10*3/mm3 Final     Neutrophil %   Date Value Ref Range Status   10/16/2019 60.0 42.7 - 76.0 % Final     Lymphocyte %   Date Value Ref Range Status   10/16/2019 32.1 19.6 - 45.3 % Final     Monocyte %   Date Value Ref Range Status   09/15/2022 12.4 (H) 5.0 - 12.0 % Final     Eosinophil %   Date Value Ref Range Status   09/15/2022 4.5 0.3 - 6.2 % Final     Basophil %   Date Value Ref Range Status   10/16/2019 1.3 0.0 - 1.5 % Final     Neutrophils Absolute   Date Value Ref Range Status   08/05/2022 70.25 (H) 1.70 - 7.00 10*3/mm3 Final     Neutrophils, Absolute   Date Value Ref Range Status   10/16/2019 5.60 1.70 - 7.00 10*3/mm3 Final     Lymphocytes, Absolute   Date Value Ref Range Status   10/16/2019 3.00 0.70 - 3.10 10*3/mm3 Final     Monocytes, Absolute   Date Value Ref Range Status   09/15/2022 19.67 (H) 0.10 - 0.90 10*3/mm3 Final     Eosinophils, Absolute   Date Value Ref Range Status   09/15/2022 7.23 (H) 0.00 - 0.40 10*3/mm3 Final     Basophils Absolute   Date Value Ref Range Status   08/05/2022 12.46 (H) 0.00 - 0.20 10*3/mm3 Final     Basophils, Absolute   Date Value Ref Range Status   10/16/2019 0.10 0.00 - 0.20 10*3/mm3 Final     nRBC   Date Value Ref Range Status   08/04/2022 2.0 (H) 0.0 - 0.2 /100 WBC Final   10/16/2019 0.2 0.0 - 0.2 /100 WBC Final       Lab Results   Component Value Date    GLUCOSE 248 (H) 10/12/2022    BUN 10 10/12/2022    CREATININE 0.78 10/12/2022    EGFRIFNONA 133 02/02/2022    BCR 12.8 10/12/2022    K 4.1 10/12/2022    CO2 27.0 10/12/2022    CALCIUM 9.0 10/12/2022    ALBUMIN 3.60 10/12/2022    LABIL2 1.0 04/18/2019    AST 39 (H) 10/12/2022    ALT 14 10/12/2022           ASSESSMENT    · CML not in remission  · Noncompliant with TKI's for her CML  · Recent cardiomyopathy diagnosis with EF around 45%, nonischemic.  Follows up with Dr. Ruiz with cardiology services  · We will get pharmacist  involved to see which TKI may be most appropriate for her given her cardiomyopathy.  After pharmacist review, plan is for Gleevec 400 mg po daily-d/w Dr. Lerma  · CML in chronic phase with no significant hematologic or molecular or cytogenetic response on   Imatinib.  ABL kinase mutational analysis was negative.  We  have represcribed to Tasigna 300 mg twice a day.  Patient has been noncompliant with treatments and ended up in the hospital with hyperleukocytosis, peripheral blood blasts.  Bone marrow biopsy suggest that she remains in chronic phase.  Tasigna discontinued due to reduced EF.  · Uncontrolled diabetes type 1, followed at the [facility] by Dr. Calles  · Chronic anemia: Stable, multifactorial from CML, to Tasigna, hydroxyurea.  This has improved  · Insomnia.  On trazodone.  Not helpful.  Will increase mirtazapine to 15 mg p.o. nightly.  DC trazodone.  · Situational anxiety, not suicidal, Zoloft is not helping. Weaned off Elavil since anxiety not improved. On Celexa 10 mg p.o. every morning and Xanax in the evening PRN  ·  Hyperleukocytosis secondary to CML  · History of medical noncompliance  · Pulmonary embolism: Xarelto restarted  · Recent COVID-19 pneumonia  · History of prolonged QTC.  Resolved on most recent EKG.   · Chronic pain.  Managed per pain management physician.  · Pneumonia/Hx of pneumonitis/hx of ARF:  CXR showed improvement from June 2022, but some residual.  Completed Augmentin and doxycycline for treatment of pneumonia.  Lungs clear to auscultation today  · Right lower extremity edema.  Resolved  · Skin rash, likely secondary to TKI.  Resolved with steroids.        Plans    · Continue Gleevec.  Notified the oral chemotherapy pharmacist to reorder the medication and notify Dr. Lerma.  · Right lower extremity edema has resolved, patient did not have the ultrasound of the right lower extremity  · Reviewed CBC with patient, white blood cell count elevated, hemoglobin and  platelets stable  · Continue hydroxyurea 1 g twice a day and 500 mg   · Continue mirtazapine to 15 mg p.o. nightly for insomnia  · Continue antiemetics  · GI consult for continued nausea and vomiting (this was present prior to TKI)-this is pending  · Patient failed 6 minute walk-Home O2 ordered.  Diagnosis of hypoxia related to cardiomyopathy, CHF, pneumonia, CML, hx. Of pneumonitis and ARF.  Patient was unable to complete the 6-minute walk in her home and is being set up for an overnight oximetry.  · Weekly CBC and CMP for now  · Last EKG showed normal QT  · Continue Xanax 0.25 mg p.o. nightly as needed number 30 pills.  Refill sent today.  · All questions answered  · Follow-up with pain management for poorly controlled pain.  She has an appointment today.  · Follow-up with Dr. Lerma as scheduled       I spent 30 total minutes, face-to-face, caring for Hope today.  90% of this time involved counseling and/or coordination of care as documented within this note.

## 2022-10-18 NOTE — PROGRESS NOTES
Called [facility] to see where they were with the overnight oximetry. They stated that they did not receive the order. Order re-faxed.

## 2022-10-19 ENCOUNTER — LAB (OUTPATIENT)
Dept: LAB | Facility: HOSPITAL | Age: 47
End: 2022-10-19

## 2022-10-19 ENCOUNTER — OFFICE VISIT (OUTPATIENT)
Dept: ONCOLOGY | Facility: CLINIC | Age: 47
End: 2022-10-19

## 2022-10-19 ENCOUNTER — SPECIALTY PHARMACY (OUTPATIENT)
Dept: PHARMACY | Facility: HOSPITAL | Age: 47
End: 2022-10-19

## 2022-10-19 ENCOUNTER — OFFICE VISIT (OUTPATIENT)
Dept: PAIN MEDICINE | Facility: CLINIC | Age: 47
End: 2022-10-19

## 2022-10-19 VITALS
SYSTOLIC BLOOD PRESSURE: 127 MMHG | TEMPERATURE: 98.1 F | HEIGHT: 64 IN | WEIGHT: 123.9 LBS | OXYGEN SATURATION: 95 % | HEART RATE: 103 BPM | DIASTOLIC BLOOD PRESSURE: 78 MMHG | BODY MASS INDEX: 21.15 KG/M2

## 2022-10-19 VITALS
RESPIRATION RATE: 16 BRPM | HEART RATE: 110 BPM | SYSTOLIC BLOOD PRESSURE: 120 MMHG | DIASTOLIC BLOOD PRESSURE: 69 MMHG | OXYGEN SATURATION: 92 %

## 2022-10-19 DIAGNOSIS — C92.10 CML (CHRONIC MYELOCYTIC LEUKEMIA): Primary | ICD-10-CM

## 2022-10-19 DIAGNOSIS — G90.522 COMPLEX REGIONAL PAIN SYNDROME TYPE 1 OF LEFT LOWER EXTREMITY: Primary | ICD-10-CM

## 2022-10-19 DIAGNOSIS — F41.9 ANXIETY: ICD-10-CM

## 2022-10-19 DIAGNOSIS — E09.43: ICD-10-CM

## 2022-10-19 DIAGNOSIS — C92.10 CML (CHRONIC MYELOCYTIC LEUKEMIA): ICD-10-CM

## 2022-10-19 LAB
ALBUMIN SERPL-MCNC: 3.4 G/DL (ref 3.5–5.2)
ALBUMIN/GLOB SERPL: 0.9 G/DL
ALP SERPL-CCNC: 1805 U/L (ref 39–117)
ALT SERPL W P-5'-P-CCNC: 15 U/L (ref 1–33)
ANION GAP SERPL CALCULATED.3IONS-SCNC: 13 MMOL/L (ref 5–15)
AST SERPL-CCNC: 30 U/L (ref 1–32)
BASOPHILS NFR BLD AUTO: ABNORMAL %
BILIRUB SERPL-MCNC: 1.3 MG/DL (ref 0–1.2)
BUN SERPL-MCNC: 17 MG/DL (ref 6–20)
BUN/CREAT SERPL: 22.1 (ref 7–25)
CALCIUM SPEC-SCNC: 8.6 MG/DL (ref 8.6–10.5)
CHLORIDE SERPL-SCNC: 101 MMOL/L (ref 98–107)
CO2 SERPL-SCNC: 25 MMOL/L (ref 22–29)
CREAT SERPL-MCNC: 0.77 MG/DL (ref 0.57–1)
DEPRECATED RDW RBC AUTO: 49.5 FL (ref 37–54)
EGFRCR SERPLBLD CKD-EPI 2021: 95.9 ML/MIN/1.73
EOSINOPHIL NFR BLD AUTO: ABNORMAL %
ERYTHROCYTE [DISTWIDTH] IN BLOOD BY AUTOMATED COUNT: 17.5 % (ref 12.3–15.4)
GLOBULIN UR ELPH-MCNC: 3.7 GM/DL
GLUCOSE SERPL-MCNC: 126 MG/DL (ref 65–99)
HCT VFR BLD AUTO: 26.6 % (ref 34–46.6)
HGB BLD-MCNC: 8.2 G/DL (ref 12–15.9)
HOLD SPECIMEN: NORMAL
LYMPHOCYTES NFR BLD AUTO: ABNORMAL %
MCH RBC QN AUTO: 25.8 PG (ref 26.6–33)
MCHC RBC AUTO-ENTMCNC: 30.8 G/DL (ref 31.5–35.7)
MCV RBC AUTO: 83.6 FL (ref 79–97)
MONOCYTES NFR BLD AUTO: ABNORMAL %
NEUTROPHILS NFR BLD AUTO: ABNORMAL %
PLATELET # BLD AUTO: 181 10*3/MM3 (ref 140–450)
PMV BLD AUTO: 10.5 FL (ref 6–12)
POTASSIUM SERPL-SCNC: 4.1 MMOL/L (ref 3.5–5.2)
PROT SERPL-MCNC: 7.1 G/DL (ref 6–8.5)
RBC # BLD AUTO: 3.18 10*6/MM3 (ref 3.77–5.28)
SODIUM SERPL-SCNC: 139 MMOL/L (ref 136–145)
WBC NRBC COR # BLD: 257.61 10*3/MM3 (ref 3.4–10.8)

## 2022-10-19 PROCEDURE — 99214 OFFICE O/P EST MOD 30 MIN: CPT | Performed by: STUDENT IN AN ORGANIZED HEALTH CARE EDUCATION/TRAINING PROGRAM

## 2022-10-19 PROCEDURE — 99214 OFFICE O/P EST MOD 30 MIN: CPT | Performed by: NURSE PRACTITIONER

## 2022-10-19 PROCEDURE — 36415 COLL VENOUS BLD VENIPUNCTURE: CPT

## 2022-10-19 PROCEDURE — 85025 COMPLETE CBC W/AUTO DIFF WBC: CPT

## 2022-10-19 PROCEDURE — 80053 COMPREHEN METABOLIC PANEL: CPT

## 2022-10-19 RX ORDER — ALPRAZOLAM 0.25 MG/1
0.25 TABLET ORAL NIGHTLY PRN
Qty: 30 TABLET | Refills: 0 | Status: SHIPPED | OUTPATIENT
Start: 2022-10-19 | End: 2023-01-12

## 2022-10-19 RX ORDER — FENTANYL 25 UG/H
1 PATCH TRANSDERMAL
Qty: 10 EACH | Refills: 0 | Status: SHIPPED | OUTPATIENT
Start: 2022-10-19 | End: 2023-01-05

## 2022-10-19 RX ORDER — ONDANSETRON 8 MG/1
8 TABLET, ORALLY DISINTEGRATING ORAL EVERY 8 HOURS PRN
Qty: 20 TABLET | Refills: 2 | Status: SHIPPED | OUTPATIENT
Start: 2022-10-19 | End: 2022-10-31 | Stop reason: SDUPTHER

## 2022-10-19 NOTE — PROGRESS NOTES
CHIEF COMPLAINT  Chief Complaint   Patient presents with   • Pain     3 month f/u for complex regional pain syndrome left lower extremity treated w/Oxycodone 10/19 @ 11 am        Primary Care  Alida Latham APRN Subjective Hope NICHOLAS Mc is a 47 y.o. female  who presents for cancer-related pain and left lower extremity allodynia.  She states that she is having progressively worsening pain for many years following the diagnosis of CML.  She has been previously treated with over-the-counter medications which are no longer effective.  She states in the past, she had taken some hydrocodone which was also not effective.  She is unable to tolerate gabapentin due to side effects.  In addition to describing generalized leg and body aches from her cancer, she also describes allodynia-like symptoms in the left leg.  She states is difficult for her to wear shoes due to significant pain in the left foot.  She also describes pain with light touch of the left lower extremity.  She endorses swelling in the left lower extremity as well as changing of temperature left lower extremity.  She also has significant trouble walking and some weakness    Pain  Associated symptoms include arthralgias, fatigue, numbness and weakness.   Leg Pain   Associated symptoms include numbness.   Extremity Pain   Associated symptoms include numbness.        Location: Whole body, left lower extremity  Onset: Years ago  Duration: Progressively worsening  Timing: Constant throughout the day  Quality: Aching sharp pains in the left lower extremity  Severity: Today: 10       Last Week: 6       Worst: 10  Modifying Factors: The pain is worse with any type of movement and physical activity.  Pain is also exacerbated by light touch of the left lower extremity    Physical Therapy: no    Interval Update 10/19/2022: Reporting 10 out of 10 pain which is uncontrolled by oxycodone.    The following portions of the patient's history were reviewed and  updated as appropriate: allergies, current medications, past family history, past medical history, past social history, past surgical history and problem list.      Current Outpatient Medications:   •  allopurinol (ZYLOPRIM) 100 MG tablet, Take 1 tablet by mouth Daily., Disp: 30 tablet, Rfl: 0  •  ALPRAZolam (Xanax) 0.25 MG tablet, Take 1 tablet by mouth At Night As Needed for Anxiety., Disp: 30 tablet, Rfl: 0  •  budesonide-formoterol (SYMBICORT) 160-4.5 MCG/ACT inhaler, Inhale 2 puffs 2 (Two) Times a Day., Disp: 10.2 g, Rfl: 1  •  citalopram (CeleXA) 10 MG tablet, Take 1 tablet by mouth Daily. To begin 1/31/22, Disp: 30 tablet, Rfl: 2  •  folic acid (FOLVITE) 1 MG tablet, Take 1 tablet by mouth Daily., Disp: 30 tablet, Rfl: 0  •  guaiFENesin (MUCINEX) 600 MG 12 hr tablet, Take 1 tablet by mouth Every 12 (Twelve) Hours., Disp: 30 tablet, Rfl: 0  •  hydroxyurea (HYDREA) 500 MG capsule, Take 2 capsules by mouth 2 (Two) Times a Day., Disp: 120 capsule, Rfl: 0  •  hydrOXYzine (ATARAX) 25 MG tablet, Take 1 tablet by mouth Every 6 (Six) Hours As Needed for Itching., Disp: 20 tablet, Rfl: 0  •  imatinib (GLEEVEC) 400 MG chemo tablet, Take 1 tablet by mouth Daily for 30 days. Take with food and glass of water. Do not take with Grapefruit juice., Disp: 30 tablet, Rfl: 5  •  Insulin Glargine (BASAGLAR KWIKPEN) 100 UNIT/ML injection pen, Inject 20 Units under the skin into the appropriate area as directed Daily., Disp: 10 mL, Rfl: 3  •  Insulin Lispro (ADMELOG SOLOSTAR) 100 UNIT/ML injection pen, Inject 6 Units under the skin into the appropriate area as directed 3 (Three) Times a Day., Disp: 3 mL, Rfl: 3  •  Insulin Lispro (ADMELOG SOLOSTAR) 100 UNIT/ML injection pen, Inject  6 units under the skin into the appropriate area three times daily as directed., Disp: 15 mL, Rfl: 0  •  losartan (COZAAR) 25 MG tablet, Take 1 tablet by mouth Daily., Disp: 30 tablet, Rfl: 0  •  metoprolol succinate XL (TOPROL-XL) 25 MG 24 hr tablet,  Take 25 mg by mouth Daily., Disp: , Rfl:   •  mirtazapine (REMERON) 15 MG tablet, Take 1 tablet by mouth Every Night for 30 days. Start Mirtazapine and decrease Trazodone to 1/2 tablet x 14 days and then stop Trazodone., Disp: 30 tablet, Rfl: 2  •  ondansetron ODT (ZOFRAN-ODT) 8 MG disintegrating tablet, Place 1 tablet on the tongue Every 8 (Eight) Hours As Needed for Nausea or Vomiting., Disp: 20 tablet, Rfl: 2  •  predniSONE (DELTASONE) 10 MG tablet, Take 2 tablets by mouth Take As Directed. 20 mg BID x 5 days then 20 mg daily x 3 days then 10 mg daily x 3 days, then stop, Disp: 16 tablet, Rfl: 0  •  prochlorperazine (COMPAZINE) 25 MG suppository, Insert 1 suppository into the rectum Every 12 (Twelve) Hours As Needed for Nausea or Vomiting., Disp: 14 suppository, Rfl: 2  •  rivaroxaban (XARELTO) 20 MG tablet, Take 1 tablet by mouth Daily., Disp: 90 tablet, Rfl: 0  •  triamcinolone (KENALOG) 0.1 % ointment, Apply 1 application topically to the appropriate area as directed 2 (Two) Times a Day for 14 days., Disp: 30 g, Rfl: 0  •  fentaNYL (DURAGESIC) 25 MCG/HR patch, Place 1 patch on the skin as directed by provider Every 72 (Seventy-Two) Hours., Disp: 10 each, Rfl: 0    Review of Systems   Constitutional: Positive for fatigue.   Musculoskeletal: Positive for arthralgias and gait problem.   Neurological: Positive for weakness and numbness.       Vitals:    10/19/22 1535   BP: 120/69   Pulse: 110   Resp: 16   SpO2: 92%   PainSc: 10-Worst pain ever       Urine Drug Screen: 6/7/2021  Appropriate: Yes    Objective   Physical Exam  Vitals and nursing note reviewed.   Constitutional:       General: She is not in acute distress.     Appearance: Normal appearance. She is normal weight.   Musculoskeletal:         General: Swelling and tenderness present.      Comments: Left lower extremity:  1.  Diffusely tender to even light palpation with signs of allodynia in the left leg and left foot  2.  Swelling present in the left  foot compared to the right foot  3.  Slight decreased temperature in the left leg compared to the right leg   Skin:     General: Skin is warm and dry.   Neurological:      Mental Status: She is alert.           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 Discontinue oxycodone  2. Switch to fentanyl 25 mcg  3. UDS inspect appropriate  --- Follow-up 1 month           INSPECT REPORT    As part of the patient's treatment plan, I may be prescribing controlled substances. The patient has been made aware of appropriate use of such medications, including potential risk of somnolence, limited ability to drive and/or work safely, and the potential for dependence or overdose. It has also bee made clear that these medications are for use by this patient only, without concomitant use of alcohol or other substances unless prescribed.     Patient has completed prescribing agreement detailing terms of continued prescribing of controlled substances, including monitoring SMITH reports, urine drug screening, and pill counts if necessary. The patient is aware that inappropriate use will results in cessation of prescribing such medications.    INSPECT report has been reviewed and scanned into the patient's chart.    As the clinician, I personally reviewed the INSPECT from  10/18/2022.    History and physical exam exhibit continued safe and appropriate use of controlled substances.      EMR Dragon/Transcription disclaimer:   Much of this encounter note is an electronic transcription/translation of spoken language to printed text. The electronic translation of spoken language may permit erroneous, or at times, nonsensical words or phrases to be inadvertently transcribed; Although I have reviewed the note for such errors, some may still exist.

## 2022-10-19 NOTE — PROGRESS NOTES
Specialty Pharmacy Note: Gleevec    Per provider, PETROS Gill, resume Gleevec.  Medication will arrive at office for patient to  at her MD visit tomorrow.    10/19/2022   WBC 3.40 - 10.80 10*3/mm3 257.61 (A)   Hemoglobin 12.0 - 15.9 g/dL 8.2 (A)   Hematocrit 34.0 - 46.6 % 26.6 (A)   Platelets 140 - 450 10*3/mm3 181   Creatinine 0.57 - 1.00 mg/dL 0.77   BUN 6 - 20 mg/dL 17   Sodium 136 - 145 mmol/L 139   Potassium 3.5 - 5.2 mmol/L 4.1   Glucose 65 - 99 mg/dL 126 (A)   Calcium 8.6 - 10.5 mg/dL 8.6   Albumin 3.50 - 5.20 g/dL 3.40 (A)   Total Protein 6.0 - 8.5 g/dL 7.1   AST (SGOT) 1 - 32 U/L 30   ALT (SGPT) 1 - 33 U/L 15   Alkaline Phosphatase 39 - 117 U/L 1,805 (A)   Total Bilirubin 0.0 - 1.2 mg/dL 1.3 (A)     Thanks,    Ros CALHOUN, PharmD

## 2022-10-31 ENCOUNTER — TELEPHONE (OUTPATIENT)
Dept: PHARMACY | Facility: HOSPITAL | Age: 47
End: 2022-10-31

## 2022-10-31 ENCOUNTER — OFFICE VISIT (OUTPATIENT)
Dept: ONCOLOGY | Facility: CLINIC | Age: 47
End: 2022-10-31

## 2022-10-31 ENCOUNTER — LAB (OUTPATIENT)
Dept: LAB | Facility: HOSPITAL | Age: 47
End: 2022-10-31

## 2022-10-31 ENCOUNTER — SPECIALTY PHARMACY (OUTPATIENT)
Dept: PHARMACY | Facility: HOSPITAL | Age: 47
End: 2022-10-31

## 2022-10-31 ENCOUNTER — APPOINTMENT (OUTPATIENT)
Dept: GENERAL RADIOLOGY | Facility: HOSPITAL | Age: 47
End: 2022-10-31

## 2022-10-31 ENCOUNTER — HOSPITAL ENCOUNTER (EMERGENCY)
Facility: HOSPITAL | Age: 47
Discharge: HOME | End: 2022-10-31
Attending: EMERGENCY MEDICINE | Admitting: EMERGENCY MEDICINE

## 2022-10-31 ENCOUNTER — APPOINTMENT (OUTPATIENT)
Dept: CT IMAGING | Facility: HOSPITAL | Age: 47
End: 2022-10-31

## 2022-10-31 VITALS
OXYGEN SATURATION: 98 % | RESPIRATION RATE: 16 BRPM | DIASTOLIC BLOOD PRESSURE: 71 MMHG | HEIGHT: 64 IN | BODY MASS INDEX: 25.71 KG/M2 | WEIGHT: 150.57 LBS | SYSTOLIC BLOOD PRESSURE: 119 MMHG | TEMPERATURE: 98 F | HEART RATE: 101 BPM

## 2022-10-31 VITALS
TEMPERATURE: 98 F | HEIGHT: 64 IN | BODY MASS INDEX: 21.27 KG/M2 | OXYGEN SATURATION: 93 % | HEART RATE: 110 BPM | DIASTOLIC BLOOD PRESSURE: 74 MMHG | RESPIRATION RATE: 20 BRPM | SYSTOLIC BLOOD PRESSURE: 122 MMHG

## 2022-10-31 DIAGNOSIS — R60.1 ANASARCA: ICD-10-CM

## 2022-10-31 DIAGNOSIS — C95.90 LEUKEMIA NOT HAVING ACHIEVED REMISSION, UNSPECIFIED LEUKEMIA TYPE: Primary | ICD-10-CM

## 2022-10-31 DIAGNOSIS — R06.00 DYSPNEA, UNSPECIFIED TYPE: ICD-10-CM

## 2022-10-31 DIAGNOSIS — R11.2 NAUSEA AND VOMITING, UNSPECIFIED VOMITING TYPE: ICD-10-CM

## 2022-10-31 DIAGNOSIS — R10.9 ABDOMINAL PAIN, UNSPECIFIED ABDOMINAL LOCATION: Primary | ICD-10-CM

## 2022-10-31 DIAGNOSIS — R11.2 NAUSEA AND VOMITING, UNSPECIFIED VOMITING TYPE: Primary | ICD-10-CM

## 2022-10-31 LAB
ALBUMIN SERPL-MCNC: 3 G/DL (ref 3.5–5.2)
ALBUMIN SERPL-MCNC: 3.2 G/DL (ref 3.5–5.2)
ALBUMIN/GLOB SERPL: 0.9 G/DL
ALBUMIN/GLOB SERPL: 1 G/DL
ALP SERPL-CCNC: 1510 U/L (ref 39–117)
ALP SERPL-CCNC: 1747 U/L (ref 39–117)
ALT SERPL W P-5'-P-CCNC: 10 U/L (ref 1–33)
ALT SERPL W P-5'-P-CCNC: 9 U/L (ref 1–33)
ANION GAP SERPL CALCULATED.3IONS-SCNC: 12 MMOL/L (ref 5–15)
ANION GAP SERPL CALCULATED.3IONS-SCNC: 13 MMOL/L (ref 5–15)
ANISOCYTOSIS BLD QL: ABNORMAL
AST SERPL-CCNC: 19 U/L (ref 1–32)
AST SERPL-CCNC: 19 U/L (ref 1–32)
BASOPHILS # BLD MANUAL: 20.33 10*3/MM3 (ref 0–0.2)
BASOPHILS NFR BLD AUTO: ABNORMAL %
BASOPHILS NFR BLD MANUAL: 6 % (ref 0–1.5)
BILIRUB SERPL-MCNC: 1 MG/DL (ref 0–1.2)
BILIRUB SERPL-MCNC: 1 MG/DL (ref 0–1.2)
BLASTS NFR BLD MANUAL: 45 % (ref 0–0)
BUN SERPL-MCNC: 12 MG/DL (ref 6–20)
BUN SERPL-MCNC: 12 MG/DL (ref 6–20)
BUN/CREAT SERPL: 15.6 (ref 7–25)
BUN/CREAT SERPL: 15.8 (ref 7–25)
CALCIUM SPEC-SCNC: 8.3 MG/DL (ref 8.6–10.5)
CALCIUM SPEC-SCNC: 8.4 MG/DL (ref 8.6–10.5)
CHLORIDE SERPL-SCNC: 101 MMOL/L (ref 98–107)
CHLORIDE SERPL-SCNC: 101 MMOL/L (ref 98–107)
CO2 SERPL-SCNC: 24 MMOL/L (ref 22–29)
CO2 SERPL-SCNC: 25 MMOL/L (ref 22–29)
CREAT SERPL-MCNC: 0.76 MG/DL (ref 0.57–1)
CREAT SERPL-MCNC: 0.77 MG/DL (ref 0.57–1)
D-LACTATE SERPL-SCNC: 1 MMOL/L (ref 0.5–2)
DEPRECATED RDW RBC AUTO: 50.3 FL (ref 37–54)
DEPRECATED RDW RBC AUTO: 51.9 FL (ref 37–54)
EGFRCR SERPLBLD CKD-EPI 2021: 95.9 ML/MIN/1.73
EGFRCR SERPLBLD CKD-EPI 2021: 97.4 ML/MIN/1.73
EOSINOPHIL # BLD MANUAL: 13.55 10*3/MM3 (ref 0–0.4)
EOSINOPHIL NFR BLD AUTO: ABNORMAL %
EOSINOPHIL NFR BLD MANUAL: 4 % (ref 0.3–6.2)
ERYTHROCYTE [DISTWIDTH] IN BLOOD BY AUTOMATED COUNT: 18.5 % (ref 12.3–15.4)
ERYTHROCYTE [DISTWIDTH] IN BLOOD BY AUTOMATED COUNT: 19.3 % (ref 12.3–15.4)
GLOBULIN UR ELPH-MCNC: 3.3 GM/DL
GLOBULIN UR ELPH-MCNC: 3.3 GM/DL
GLUCOSE SERPL-MCNC: 166 MG/DL (ref 65–99)
GLUCOSE SERPL-MCNC: 202 MG/DL (ref 65–99)
HCT VFR BLD AUTO: 26.1 % (ref 34–46.6)
HCT VFR BLD AUTO: 26.1 % (ref 34–46.6)
HGB BLD-MCNC: 7.9 G/DL (ref 12–15.9)
HGB BLD-MCNC: 8.1 G/DL (ref 12–15.9)
HOLD SPECIMEN: NORMAL
LARGE PLATELETS: ABNORMAL
LIPASE SERPL-CCNC: 10 U/L (ref 13–60)
LYMPHOCYTES # BLD MANUAL: 13.55 10*3/MM3 (ref 0.7–3.1)
LYMPHOCYTES NFR BLD AUTO: ABNORMAL %
MAGNESIUM SERPL-MCNC: 1.9 MG/DL (ref 1.6–2.6)
MCH RBC QN AUTO: 24.1 PG (ref 26.6–33)
MCH RBC QN AUTO: 25.4 PG (ref 26.6–33)
MCHC RBC AUTO-ENTMCNC: 30.4 G/DL (ref 31.5–35.7)
MCHC RBC AUTO-ENTMCNC: 31 G/DL (ref 31.5–35.7)
MCV RBC AUTO: 79.4 FL (ref 79–97)
MCV RBC AUTO: 81.8 FL (ref 79–97)
METAMYELOCYTES NFR BLD MANUAL: 3 % (ref 0–0)
MONOCYTES NFR BLD AUTO: ABNORMAL %
NEUTROPHILS # BLD AUTO: 111.8 10*3/MM3 (ref 1.7–7)
NEUTROPHILS NFR BLD AUTO: ABNORMAL %
NEUTROPHILS NFR BLD MANUAL: 30 % (ref 42.7–76)
NEUTS BAND NFR BLD MANUAL: 3 % (ref 0–5)
NT-PROBNP SERPL-MCNC: ABNORMAL PG/ML (ref 0–450)
PATHOLOGY REVIEW: YES
PLATELET # BLD AUTO: 322 10*3/MM3 (ref 140–450)
PLATELET # BLD AUTO: 334 10*3/MM3 (ref 140–450)
PMV BLD AUTO: 11.3 FL (ref 6–12)
PMV BLD AUTO: 7.9 FL (ref 6–12)
POTASSIUM SERPL-SCNC: 3.6 MMOL/L (ref 3.5–5.2)
POTASSIUM SERPL-SCNC: 3.6 MMOL/L (ref 3.5–5.2)
PROMYELOCYTES NFR BLD MANUAL: 5 % (ref 0–0)
PROT SERPL-MCNC: 6.3 G/DL (ref 6–8.5)
PROT SERPL-MCNC: 6.5 G/DL (ref 6–8.5)
RBC # BLD AUTO: 3.19 10*6/MM3 (ref 3.77–5.28)
RBC # BLD AUTO: 3.28 10*6/MM3 (ref 3.77–5.28)
SCAN SLIDE: NORMAL
SODIUM SERPL-SCNC: 138 MMOL/L (ref 136–145)
SODIUM SERPL-SCNC: 138 MMOL/L (ref 136–145)
TROPONIN T SERPL-MCNC: <0.01 NG/ML (ref 0–0.03)
VARIANT LYMPHS NFR BLD MANUAL: 4 % (ref 19.6–45.3)
WBC MORPH BLD: NORMAL
WBC NRBC COR # BLD: 338.8 10*3/MM3 (ref 3.4–10.8)
WBC NRBC COR # BLD: 347.52 10*3/MM3 (ref 3.4–10.8)
WHOLE BLOOD HOLD COAG: NORMAL
WHOLE BLOOD HOLD SPECIMEN: NORMAL

## 2022-10-31 PROCEDURE — 83605 ASSAY OF LACTIC ACID: CPT

## 2022-10-31 PROCEDURE — 84484 ASSAY OF TROPONIN QUANT: CPT | Performed by: EMERGENCY MEDICINE

## 2022-10-31 PROCEDURE — 83690 ASSAY OF LIPASE: CPT | Performed by: EMERGENCY MEDICINE

## 2022-10-31 PROCEDURE — 80053 COMPREHEN METABOLIC PANEL: CPT

## 2022-10-31 PROCEDURE — 85025 COMPLETE CBC W/AUTO DIFF WBC: CPT | Performed by: EMERGENCY MEDICINE

## 2022-10-31 PROCEDURE — 74176 CT ABD & PELVIS W/O CONTRAST: CPT

## 2022-10-31 PROCEDURE — 71045 X-RAY EXAM CHEST 1 VIEW: CPT

## 2022-10-31 PROCEDURE — 99215 OFFICE O/P EST HI 40 MIN: CPT | Performed by: INTERNAL MEDICINE

## 2022-10-31 PROCEDURE — 36415 COLL VENOUS BLD VENIPUNCTURE: CPT

## 2022-10-31 PROCEDURE — 83735 ASSAY OF MAGNESIUM: CPT

## 2022-10-31 PROCEDURE — 85025 COMPLETE CBC W/AUTO DIFF WBC: CPT

## 2022-10-31 PROCEDURE — 71275 CT ANGIOGRAPHY CHEST: CPT

## 2022-10-31 PROCEDURE — 93005 ELECTROCARDIOGRAM TRACING: CPT | Performed by: EMERGENCY MEDICINE

## 2022-10-31 PROCEDURE — 85007 BL SMEAR W/DIFF WBC COUNT: CPT | Performed by: EMERGENCY MEDICINE

## 2022-10-31 PROCEDURE — 87040 BLOOD CULTURE FOR BACTERIA: CPT | Performed by: EMERGENCY MEDICINE

## 2022-10-31 PROCEDURE — 80053 COMPREHEN METABOLIC PANEL: CPT | Performed by: EMERGENCY MEDICINE

## 2022-10-31 PROCEDURE — 99284 EMERGENCY DEPT VISIT MOD MDM: CPT

## 2022-10-31 PROCEDURE — 83880 ASSAY OF NATRIURETIC PEPTIDE: CPT | Performed by: EMERGENCY MEDICINE

## 2022-10-31 PROCEDURE — 0 IOPAMIDOL PER 1 ML: Performed by: EMERGENCY MEDICINE

## 2022-10-31 RX ORDER — SODIUM CHLORIDE 0.9 % (FLUSH) 0.9 %
10 SYRINGE (ML) INJECTION AS NEEDED
Status: DISCONTINUED | OUTPATIENT
Start: 2022-10-31 | End: 2022-10-31 | Stop reason: HOSPADM

## 2022-10-31 RX ORDER — ONDANSETRON 8 MG/1
8 TABLET, ORALLY DISINTEGRATING ORAL EVERY 8 HOURS PRN
Qty: 20 TABLET | Refills: 2 | Status: SHIPPED | OUTPATIENT
Start: 2022-10-31 | End: 2023-03-10

## 2022-10-31 RX ADMIN — IOPAMIDOL 100 ML: 755 INJECTION, SOLUTION INTRAVENOUS at 17:11
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
## 2022-10-31 NOTE — PROGRESS NOTES
Specialty Pharmacy Note: Gleevec    Spoke to Nieves on telephone today.  She report that she was unable to fill her Xanax or Zofran ODT due to an issue at the pharmacy.  She also reports that she has not been eating or sleeping since around 10/19/22.  Her pain management was changed to Fentanyl and per Nieves, since that time, she has been unable to keep anything down.  She said she is vomiting yellow bile and is also having some sores in her mouth.  Overall she is feeling poorly.  Contacted pharmacy to check status of Xanax and Zofran.  [facility] reports that X[facility]ax can be filled, it was just not ready at the time the patient came and the the Zofran has a high copay of over $100 and patient didn't want to .    Based on the assessment over the phone with pharmacy, Nieves will see MD today in the clinic for further evaluation.  She did  Gleevec this morning and is requesting refill of Hydrea.        Thanks,    Ros CALHOUN, PharmD

## 2022-11-01 ENCOUNTER — SPECIALTY PHARMACY (OUTPATIENT)
Dept: PHARMACY | Facility: HOSPITAL | Age: 47
End: 2022-11-01

## 2022-11-01 DIAGNOSIS — C92.10 CML (CHRONIC MYELOCYTIC LEUKEMIA): ICD-10-CM

## 2022-11-01 DIAGNOSIS — C95.90 LEUKEMIA NOT HAVING ACHIEVED REMISSION, UNSPECIFIED LEUKEMIA TYPE: ICD-10-CM

## 2022-11-01 DIAGNOSIS — C92.10 BLAST CRISIS PHASE OF CHRONIC MYELOID LEUKEMIA: Primary | ICD-10-CM

## 2022-11-01 LAB
LAB AP CASE REPORT: NORMAL
PATH REPORT.FINAL DX SPEC: NORMAL

## 2022-11-01 RX ORDER — HYDROXYUREA 500 MG/1
1000 CAPSULE ORAL 2 TIMES DAILY
Qty: 120 CAPSULE | Refills: 0 | Status: ON HOLD | OUTPATIENT
Start: 2022-11-01 | End: 2022-11-07 | Stop reason: SDUPTHER

## 2022-11-01 NOTE — PROGRESS NOTES
Specialty Pharmacy Note: Gleevec    Per Dr. Lerma, increase Gleevec to 600mg PO QDAY.  She is to continue the 400mg dose until 600mg dosing is available.          10/31/2022   WBC 3.40 - 10.80 10*3/mm3 338.80 (A)   Neutrophils Absolute 1.70 - 7.00 10*3/mm3 111.80 (A)   Hemoglobin 12.0 - 15.9 g/dL 7.9 (A)   Hematocrit 34.0 - 46.6 % 26.1 (A)   Platelets 140 - 450 10*3/mm3 322   Creatinine 0.57 - 1.00 mg/dL 0.77   BUN 6 - 20 mg/dL 12   Sodium 136 - 145 mmol/L 138   Potassium 3.5 - 5.2 mmol/L 3.6   Glucose 65 - 99 mg/dL 166 (A)   Magnesium 1.6 - 2.6 mg/dL 1.9   Calcium 8.6 - 10.5 mg/dL 8.3 (A)   Albumin 3.50 - 5.20 g/dL 3.20 (A)   Total Protein 6.0 - 8.5 g/dL 6.5   AST (SGOT) 1 - 32 U/L 19   ALT (SGPT) 1 - 33 U/L 10   Alkaline Phosphatase 39 - 117 U/L 1,747 (A)   Total Bilirubin 0.0 - 1.2 mg/dL 1.0         Ros Lou, PharmD

## 2022-11-02 ENCOUNTER — SPECIALTY PHARMACY (OUTPATIENT)
Dept: PHARMACY | Facility: HOSPITAL | Age: 47
End: 2022-11-02

## 2022-11-02 RX ORDER — IMATINIB MESYLATE 400 MG/1
400 TABLET, FILM COATED ORAL DAILY
Qty: 30 TABLET | Refills: 2 | Status: SHIPPED | OUTPATIENT
Start: 2022-11-02 | End: 2022-11-17 | Stop reason: HOSPADM

## 2022-11-02 RX ORDER — IMATINIB MESYLATE 100 MG/1
200 TABLET, FILM COATED ORAL DAILY
Qty: 60 TABLET | Refills: 2 | Status: SHIPPED | OUTPATIENT
Start: 2022-11-02 | End: 2022-11-17 | Stop reason: HOSPADM

## 2022-11-02 NOTE — PROGRESS NOTES
Specialty Pharmacy Refill Coordination Note     Nieves is a 47 y.o. female contacted today regarding refills of hydroxyurea specialty medication(s).    Reviewed and verified with patient:       Specialty medication(s) and dose(s) confirmed: yes    Refill Questions    Flowsheet Row Most Recent Value   Changes to allergies? No   Changes to medications? No   New conditions since last clinic visit No   Unplanned office visit, urgent care, ED, or hospital admission in the last 4 weeks  No   How does patient/caregiver feel medication is working? Good   Financial problems or insurance changes  No   Since the previous refill, were any specialty medication doses or scheduled injections missed or delayed?  No   Does this patient require a clinical escalation to a pharmacist? No          Delivery Questions    Flowsheet Row Most Recent Value   Delivery method [employer]   Delivery address correct? Yes   Delivery phone number [phone]   Preferred delivery time? AM   Number of medications in delivery 2   Medication being filled and delivered hydrea and zofran   Doses left of specialty medications a few   Is there any medication that is due not being filled? No   Supplies needed? No supplies needed   Cooler needed? No   Do any medications need mixed or dated? No   Copay form of payment Payment plan already set up   Additional comments NA   Questions or concerns for the pharmacist? No   Explain any questions or concerns for the pharmacist NA   Are any medications first time fills? No          Ship on Thursday 11/3/22 to arrive Friday 11/4/22.        Follow-up: 1 month(s)     Jyotsna Anton, Pharmacy Technician  Specialty Pharmacy Technician

## 2022-11-03 ENCOUNTER — APPOINTMENT (OUTPATIENT)
Dept: GENERAL RADIOLOGY | Facility: HOSPITAL | Age: 47
End: 2022-11-03

## 2022-11-03 ENCOUNTER — HOSPITAL ENCOUNTER (INPATIENT)
Facility: HOSPITAL | Age: 47
LOS: 14 days | Discharge: ANOTHER HOSPITAL | End: 2022-11-17
Attending: EMERGENCY MEDICINE | Admitting: INTERNAL MEDICINE

## 2022-11-03 DIAGNOSIS — C92.10 CML (CHRONIC MYELOCYTIC LEUKEMIA): ICD-10-CM

## 2022-11-03 DIAGNOSIS — R07.9 CHEST PAIN, UNSPECIFIED TYPE: Primary | ICD-10-CM

## 2022-11-03 DIAGNOSIS — D72.829 LEUKOCYTOSIS, UNSPECIFIED TYPE: ICD-10-CM

## 2022-11-03 DIAGNOSIS — J81.0 ACUTE PULMONARY EDEMA: ICD-10-CM

## 2022-11-03 DIAGNOSIS — R06.00 DYSPNEA, UNSPECIFIED TYPE: ICD-10-CM

## 2022-11-03 LAB
ALBUMIN SERPL-MCNC: 3.4 G/DL (ref 3.5–5.2)
ALBUMIN/GLOB SERPL: 1 G/DL
ALP SERPL-CCNC: 1826 U/L (ref 39–117)
ALT SERPL W P-5'-P-CCNC: 11 U/L (ref 1–33)
ANION GAP SERPL CALCULATED.3IONS-SCNC: 19 MMOL/L (ref 5–15)
AST SERPL-CCNC: 22 U/L (ref 1–32)
BASOPHILS # BLD MANUAL: 48.97 10*3/MM3 (ref 0–0.2)
BASOPHILS NFR BLD MANUAL: 13 % (ref 0–1.5)
BILIRUB SERPL-MCNC: 1.2 MG/DL (ref 0–1.2)
BLASTS NFR BLD MANUAL: 66 % (ref 0–0)
BUN SERPL-MCNC: 11 MG/DL (ref 6–20)
BUN/CREAT SERPL: 11.5 (ref 7–25)
CALCIUM SPEC-SCNC: 8.4 MG/DL (ref 8.6–10.5)
CHLORIDE SERPL-SCNC: 96 MMOL/L (ref 98–107)
CO2 SERPL-SCNC: 19 MMOL/L (ref 22–29)
CREAT SERPL-MCNC: 0.96 MG/DL (ref 0.57–1)
DEPRECATED RDW RBC AUTO: 53.8 FL (ref 37–54)
EGFRCR SERPLBLD CKD-EPI 2021: 73.6 ML/MIN/1.73
EOSINOPHIL # BLD MANUAL: 7.53 10*3/MM3 (ref 0–0.4)
EOSINOPHIL NFR BLD MANUAL: 2 % (ref 0.3–6.2)
ERYTHROCYTE [DISTWIDTH] IN BLOOD BY AUTOMATED COUNT: 19 % (ref 12.3–15.4)
GIANT PLATELETS: ABNORMAL
GLOBULIN UR ELPH-MCNC: 3.4 GM/DL
GLUCOSE BLDC GLUCOMTR-MCNC: 229 MG/DL (ref 70–105)
GLUCOSE BLDC GLUCOMTR-MCNC: 274 MG/DL (ref 70–105)
GLUCOSE BLDC GLUCOMTR-MCNC: 337 MG/DL (ref 70–105)
GLUCOSE SERPL-MCNC: 411 MG/DL (ref 65–99)
HCT VFR BLD AUTO: 27.3 % (ref 34–46.6)
HGB BLD-MCNC: 7.8 G/DL (ref 12–15.9)
HOLD SPECIMEN: NORMAL
HOLD SPECIMEN: NORMAL
LYMPHOCYTES # BLD MANUAL: 3.77 10*3/MM3 (ref 0.7–3.1)
MCH RBC QN AUTO: 23.8 PG (ref 26.6–33)
MCHC RBC AUTO-ENTMCNC: 28.7 G/DL (ref 31.5–35.7)
MCV RBC AUTO: 82.8 FL (ref 79–97)
METAMYELOCYTES NFR BLD MANUAL: 1 % (ref 0–0)
MYELOCYTES NFR BLD MANUAL: 4 % (ref 0–0)
NEUTROPHILS # BLD AUTO: 48.97 10*3/MM3 (ref 1.7–7)
NEUTROPHILS NFR BLD MANUAL: 12 % (ref 42.7–76)
NEUTS BAND NFR BLD MANUAL: 1 % (ref 0–5)
NT-PROBNP SERPL-MCNC: ABNORMAL PG/ML (ref 0–450)
PLATELET # BLD AUTO: 404 10*3/MM3 (ref 140–450)
PMV BLD AUTO: 8.5 FL (ref 6–12)
POLYCHROMASIA BLD QL SMEAR: ABNORMAL
POTASSIUM SERPL-SCNC: 4.5 MMOL/L (ref 3.5–5.2)
PROT SERPL-MCNC: 6.8 G/DL (ref 6–8.5)
RBC # BLD AUTO: 3.3 10*6/MM3 (ref 3.77–5.28)
ROULEAUX BLD QL SMEAR: ABNORMAL
SCAN SLIDE: NORMAL
SMUDGE CELLS BLD QL SMEAR: ABNORMAL
SODIUM SERPL-SCNC: 134 MMOL/L (ref 136–145)
TROPONIN T SERPL-MCNC: <0.01 NG/ML (ref 0–0.03)
TROPONIN T SERPL-MCNC: <0.01 NG/ML (ref 0–0.03)
VARIANT LYMPHS NFR BLD MANUAL: 1 % (ref 19.6–45.3)
WBC NRBC COR # BLD: 376.7 10*3/MM3 (ref 3.4–10.8)
WHOLE BLOOD HOLD COAG: NORMAL
WHOLE BLOOD HOLD SPECIMEN: NORMAL

## 2022-11-03 PROCEDURE — 25010000002 ENOXAPARIN PER 10 MG: Performed by: NURSE PRACTITIONER

## 2022-11-03 PROCEDURE — 85025 COMPLETE CBC W/AUTO DIFF WBC: CPT | Performed by: PHYSICIAN ASSISTANT

## 2022-11-03 PROCEDURE — 88185 FLOWCYTOMETRY/TC ADD-ON: CPT | Performed by: NURSE PRACTITIONER

## 2022-11-03 PROCEDURE — 88184 FLOWCYTOMETRY/ TC 1 MARKER: CPT

## 2022-11-03 PROCEDURE — 25010000002 FUROSEMIDE PER 20 MG: Performed by: PHYSICIAN ASSISTANT

## 2022-11-03 PROCEDURE — 84484 ASSAY OF TROPONIN QUANT: CPT | Performed by: PHYSICIAN ASSISTANT

## 2022-11-03 PROCEDURE — 99222 1ST HOSP IP/OBS MODERATE 55: CPT | Performed by: INTERNAL MEDICINE

## 2022-11-03 PROCEDURE — 82962 GLUCOSE BLOOD TEST: CPT

## 2022-11-03 PROCEDURE — 99285 EMERGENCY DEPT VISIT HI MDM: CPT

## 2022-11-03 PROCEDURE — 94799 UNLISTED PULMONARY SVC/PX: CPT

## 2022-11-03 PROCEDURE — 83880 ASSAY OF NATRIURETIC PEPTIDE: CPT | Performed by: PHYSICIAN ASSISTANT

## 2022-11-03 PROCEDURE — 63710000001 INSULIN GLARGINE PER 5 UNITS: Performed by: NURSE PRACTITIONER

## 2022-11-03 PROCEDURE — 63710000001 INSULIN LISPRO (HUMAN) PER 5 UNITS: Performed by: NURSE PRACTITIONER

## 2022-11-03 PROCEDURE — 81207 BCR/ABL1 GENE MINOR BP: CPT

## 2022-11-03 PROCEDURE — 93005 ELECTROCARDIOGRAM TRACING: CPT

## 2022-11-03 PROCEDURE — 36415 COLL VENOUS BLD VENIPUNCTURE: CPT

## 2022-11-03 PROCEDURE — 81206 BCR/ABL1 GENE MAJOR BP: CPT

## 2022-11-03 PROCEDURE — 85007 BL SMEAR W/DIFF WBC COUNT: CPT | Performed by: PHYSICIAN ASSISTANT

## 2022-11-03 PROCEDURE — 25010000002 FUROSEMIDE PER 20 MG: Performed by: NURSE PRACTITIONER

## 2022-11-03 PROCEDURE — 63710000001 INSULIN REGULAR HUMAN PER 5 UNITS: Performed by: PHYSICIAN ASSISTANT

## 2022-11-03 PROCEDURE — 71045 X-RAY EXAM CHEST 1 VIEW: CPT

## 2022-11-03 PROCEDURE — 80053 COMPREHEN METABOLIC PANEL: CPT | Performed by: PHYSICIAN ASSISTANT

## 2022-11-03 RX ORDER — POTASSIUM CHLORIDE 1.5 G/1.77G
40 POWDER, FOR SOLUTION ORAL AS NEEDED
Status: DISCONTINUED | OUTPATIENT
Start: 2022-11-03 | End: 2022-11-11

## 2022-11-03 RX ORDER — FUROSEMIDE 10 MG/ML
40 INJECTION INTRAMUSCULAR; INTRAVENOUS ONCE
Status: COMPLETED | OUTPATIENT
Start: 2022-11-03 | End: 2022-11-03

## 2022-11-03 RX ORDER — CHOLECALCIFEROL (VITAMIN D3) 125 MCG
5 CAPSULE ORAL NIGHTLY PRN
Status: DISCONTINUED | OUTPATIENT
Start: 2022-11-03 | End: 2022-11-17 | Stop reason: HOSPADM

## 2022-11-03 RX ORDER — GUAIFENESIN 600 MG/1
600 TABLET, EXTENDED RELEASE ORAL EVERY 12 HOURS SCHEDULED
Status: DISCONTINUED | OUTPATIENT
Start: 2022-11-03 | End: 2022-11-17 | Stop reason: HOSPADM

## 2022-11-03 RX ORDER — POTASSIUM CHLORIDE 7.45 MG/ML
10 INJECTION INTRAVENOUS
Status: DISCONTINUED | OUTPATIENT
Start: 2022-11-03 | End: 2022-11-11

## 2022-11-03 RX ORDER — ONDANSETRON 2 MG/ML
4 INJECTION INTRAMUSCULAR; INTRAVENOUS EVERY 6 HOURS PRN
Status: DISCONTINUED | OUTPATIENT
Start: 2022-11-03 | End: 2022-11-17 | Stop reason: HOSPADM

## 2022-11-03 RX ORDER — INSULIN LISPRO 100 [IU]/ML
10 INJECTION, SOLUTION INTRAVENOUS; SUBCUTANEOUS
Status: DISCONTINUED | OUTPATIENT
Start: 2022-11-03 | End: 2022-11-17 | Stop reason: HOSPADM

## 2022-11-03 RX ORDER — INSULIN LISPRO 100 [IU]/ML
2-9 INJECTION, SOLUTION INTRAVENOUS; SUBCUTANEOUS
Status: DISCONTINUED | OUTPATIENT
Start: 2022-11-03 | End: 2022-11-17 | Stop reason: HOSPADM

## 2022-11-03 RX ORDER — BUDESONIDE AND FORMOTEROL FUMARATE DIHYDRATE 160; 4.5 UG/1; UG/1
2 AEROSOL RESPIRATORY (INHALATION)
Status: DISCONTINUED | OUTPATIENT
Start: 2022-11-03 | End: 2022-11-17 | Stop reason: HOSPADM

## 2022-11-03 RX ORDER — HYDROXYUREA 500 MG/1
500 CAPSULE ORAL DAILY
Status: DISCONTINUED | OUTPATIENT
Start: 2022-11-03 | End: 2022-11-06

## 2022-11-03 RX ORDER — SODIUM CHLORIDE 0.9 % (FLUSH) 0.9 %
10 SYRINGE (ML) INJECTION EVERY 12 HOURS SCHEDULED
Status: DISCONTINUED | OUTPATIENT
Start: 2022-11-03 | End: 2022-11-17 | Stop reason: HOSPADM

## 2022-11-03 RX ORDER — SODIUM CHLORIDE 0.9 % (FLUSH) 0.9 %
10 SYRINGE (ML) INJECTION AS NEEDED
Status: DISCONTINUED | OUTPATIENT
Start: 2022-11-03 | End: 2022-11-17 | Stop reason: HOSPADM

## 2022-11-03 RX ORDER — CITALOPRAM 20 MG/1
10 TABLET ORAL DAILY
Status: DISCONTINUED | OUTPATIENT
Start: 2022-11-03 | End: 2022-11-17 | Stop reason: HOSPADM

## 2022-11-03 RX ORDER — DEXTROSE MONOHYDRATE 25 G/50ML
25 INJECTION, SOLUTION INTRAVENOUS
Status: DISCONTINUED | OUTPATIENT
Start: 2022-11-03 | End: 2022-11-17 | Stop reason: HOSPADM

## 2022-11-03 RX ORDER — POTASSIUM CHLORIDE 20 MEQ/1
40 TABLET, EXTENDED RELEASE ORAL AS NEEDED
Status: DISCONTINUED | OUTPATIENT
Start: 2022-11-03 | End: 2022-11-11

## 2022-11-03 RX ORDER — ACETAMINOPHEN 325 MG/1
650 TABLET ORAL EVERY 4 HOURS PRN
Status: DISCONTINUED | OUTPATIENT
Start: 2022-11-03 | End: 2022-11-17 | Stop reason: HOSPADM

## 2022-11-03 RX ORDER — FUROSEMIDE 10 MG/ML
40 INJECTION INTRAMUSCULAR; INTRAVENOUS
Status: COMPLETED | OUTPATIENT
Start: 2022-11-03 | End: 2022-11-03

## 2022-11-03 RX ORDER — ALPRAZOLAM 0.25 MG/1
0.25 TABLET ORAL NIGHTLY PRN
Status: DISCONTINUED | OUTPATIENT
Start: 2022-11-03 | End: 2022-11-05

## 2022-11-03 RX ORDER — ALLOPURINOL 100 MG/1
100 TABLET ORAL DAILY
Status: DISCONTINUED | OUTPATIENT
Start: 2022-11-03 | End: 2022-11-06

## 2022-11-03 RX ORDER — NICOTINE POLACRILEX 4 MG
15 LOZENGE BUCCAL
Status: DISCONTINUED | OUTPATIENT
Start: 2022-11-03 | End: 2022-11-17 | Stop reason: HOSPADM

## 2022-11-03 RX ORDER — FOLIC ACID 1 MG/1
1 TABLET ORAL DAILY
Status: DISCONTINUED | OUTPATIENT
Start: 2022-11-03 | End: 2022-11-17 | Stop reason: HOSPADM

## 2022-11-03 RX ORDER — FENTANYL 25 UG/H
1 PATCH TRANSDERMAL
Status: DISCONTINUED | OUTPATIENT
Start: 2022-11-04 | End: 2022-11-16

## 2022-11-03 RX ORDER — ALUMINA, MAGNESIA, AND SIMETHICONE 2400; 2400; 240 MG/30ML; MG/30ML; MG/30ML
15 SUSPENSION ORAL EVERY 6 HOURS PRN
Status: DISCONTINUED | OUTPATIENT
Start: 2022-11-03 | End: 2022-11-17 | Stop reason: HOSPADM

## 2022-11-03 RX ORDER — NITROGLYCERIN 0.4 MG/1
0.4 TABLET SUBLINGUAL
Status: DISCONTINUED | OUTPATIENT
Start: 2022-11-03 | End: 2022-11-17 | Stop reason: HOSPADM

## 2022-11-03 RX ORDER — ONDANSETRON 4 MG/1
4 TABLET, FILM COATED ORAL EVERY 6 HOURS PRN
Status: DISCONTINUED | OUTPATIENT
Start: 2022-11-03 | End: 2022-11-17 | Stop reason: HOSPADM

## 2022-11-03 RX ORDER — OLANZAPINE 10 MG/2ML
1 INJECTION, POWDER, LYOPHILIZED, FOR SOLUTION INTRAMUSCULAR
Status: DISCONTINUED | OUTPATIENT
Start: 2022-11-03 | End: 2022-11-17 | Stop reason: HOSPADM

## 2022-11-03 RX ORDER — HYDROXYUREA 500 MG/1
500 CAPSULE ORAL DAILY
COMMUNITY
End: 2022-11-17 | Stop reason: HOSPADM

## 2022-11-03 RX ORDER — ASPIRIN 325 MG
325 TABLET, DELAYED RELEASE (ENTERIC COATED) ORAL DAILY
Status: DISCONTINUED | OUTPATIENT
Start: 2022-11-03 | End: 2022-11-17 | Stop reason: HOSPADM

## 2022-11-03 RX ORDER — ENOXAPARIN SODIUM 100 MG/ML
40 INJECTION SUBCUTANEOUS
Status: DISCONTINUED | OUTPATIENT
Start: 2022-11-03 | End: 2022-11-14

## 2022-11-03 RX ORDER — ACETAMINOPHEN 650 MG/1
650 SUPPOSITORY RECTAL EVERY 4 HOURS PRN
Status: DISCONTINUED | OUTPATIENT
Start: 2022-11-03 | End: 2022-11-17 | Stop reason: HOSPADM

## 2022-11-03 RX ORDER — HYDROXYUREA 500 MG/1
1000 CAPSULE ORAL 2 TIMES DAILY
Status: DISCONTINUED | OUTPATIENT
Start: 2022-11-03 | End: 2022-11-06

## 2022-11-03 RX ORDER — MIRTAZAPINE 15 MG/1
15 TABLET, FILM COATED ORAL NIGHTLY
Status: DISCONTINUED | OUTPATIENT
Start: 2022-11-03 | End: 2022-11-05

## 2022-11-03 RX ORDER — ACETAMINOPHEN 160 MG/5ML
650 SOLUTION ORAL EVERY 4 HOURS PRN
Status: DISCONTINUED | OUTPATIENT
Start: 2022-11-03 | End: 2022-11-17 | Stop reason: HOSPADM

## 2022-11-03 RX ADMIN — INSULIN LISPRO 7 UNITS: 100 INJECTION, SOLUTION INTRAVENOUS; SUBCUTANEOUS at 13:14

## 2022-11-03 RX ADMIN — FOLIC ACID 1 MG: 1 TABLET ORAL at 16:51

## 2022-11-03 RX ADMIN — ENOXAPARIN SODIUM 40 MG: 100 INJECTION SUBCUTANEOUS at 16:50

## 2022-11-03 RX ADMIN — FUROSEMIDE 40 MG: 10 INJECTION, SOLUTION INTRAMUSCULAR; INTRAVENOUS at 17:35

## 2022-11-03 RX ADMIN — INSULIN HUMAN 4 UNITS: 100 INJECTION, SOLUTION PARENTERAL at 11:50

## 2022-11-03 RX ADMIN — Medication 10 ML: at 16:51

## 2022-11-03 RX ADMIN — ALLOPURINOL 100 MG: 100 TABLET ORAL at 16:51

## 2022-11-03 RX ADMIN — INSULIN LISPRO 10 UNITS: 100 INJECTION, SOLUTION INTRAVENOUS; SUBCUTANEOUS at 16:52

## 2022-11-03 RX ADMIN — CITALOPRAM HYDROBROMIDE 10 MG: 20 TABLET ORAL at 16:51

## 2022-11-03 RX ADMIN — Medication 10 ML: at 22:07

## 2022-11-03 RX ADMIN — INSULIN GLARGINE 30 UNITS: 100 INJECTION, SOLUTION SUBCUTANEOUS at 16:51

## 2022-11-03 RX ADMIN — MIRTAZAPINE 15 MG: 15 TABLET, FILM COATED ORAL at 22:07

## 2022-11-03 RX ADMIN — INSULIN LISPRO 4 UNITS: 100 INJECTION, SOLUTION INTRAVENOUS; SUBCUTANEOUS at 18:32

## 2022-11-03 RX ADMIN — GUAIFENESIN 600 MG: 600 TABLET, EXTENDED RELEASE ORAL at 22:07

## 2022-11-03 RX ADMIN — ASPIRIN 325 MG: 325 TABLET, COATED ORAL at 13:15

## 2022-11-03 RX ADMIN — HYDROXYUREA 500 MG: 500 CAPSULE ORAL at 18:32

## 2022-11-03 RX ADMIN — Medication 5 MG: at 22:07

## 2022-11-03 RX ADMIN — NITROGLYCERIN 0.5 INCH: 20 OINTMENT TOPICAL at 10:21

## 2022-11-03 RX ADMIN — ALPRAZOLAM 0.25 MG: 0.25 TABLET ORAL at 22:06

## 2022-11-03 RX ADMIN — HYDROXYUREA 1000 MG: 500 CAPSULE ORAL at 22:24

## 2022-11-03 RX ADMIN — FUROSEMIDE 40 MG: 10 INJECTION, SOLUTION INTRAMUSCULAR; INTRAVENOUS at 22:25

## 2022-11-03 RX ADMIN — FUROSEMIDE 40 MG: 10 INJECTION, SOLUTION INTRAMUSCULAR; INTRAVENOUS at 11:55

## 2022-11-03 NOTE — H&P
[facility] Medicine Services      Patient Name: Nieves Mc  : 1975  MRN: [id number]  Primary Care Physician:  Alida Latham APRN  Date of admission: 11/3/2022      Subjective      Chief Complaint: Chest pain    History of Present Illness: Nieves Mc is a 47 y.o. female with a past medical history of CML, depression with anxiety, PE, and type 2 diabetes mellitus who presented to [facility] on 11/3/2022 complaining of chest pain.  Patient reports around 9:00 AM she was laying on her 's chest and started to have chest pain.  She said accompanied with this where she was unable to hear out of her left ear and her left arm went numb.  She reports this is never happened before.  Since being in the emergency department her chest pain has subsided.  Previously she described her chest pain as dull.  She did not take anything at home to try to help.  She explains that she has been compliant on her medications at home.  She is currently on 2 L nasal cannula of oxygen and does not wear this at home.  She denies any recent weight gain, however she has had leg swelling in which she said is chronic for her.  She saw Dr. Lerma on Monday and was sent to the emergency department to rule out any infection due to her white blood cells being elevated, but infection was ruled out and she was sent home.  She explains she has also been on steroids and taking a chemo pill every day as well.  She explains she has had some shortness of breath.  She denies any aggravating or alleviating factors.  She has does have a chronic cough.  She is also recently had nausea, vomiting, and diarrhea.  She denies any recent fever or chills.      In the ED, CXR showed Cardiomegaly and mild pulmonary vascular congestion which is increase in   the comparison. This may be in part artifactual from low lung volumes   versus underlying pulmonary edema or pneumonia in the correct clinical   Setting.   All vital signs stable upon admission except patient on 2 L nasal cannula of oxygen and blood pressure 94/62.  All labs unremarkable upon admission except blood glucose 411, sodium 134, CO2 19, alk phos 1826, proBNP 29,518, white blood cells elevated, hemoglobin 7.8.  Patient received Lasix, regular insulin, Nitrostat, normal saline 250 mL bolus in ED.  Hospitalist were contacted to admit patient for further care and management.    Review of Systems   Constitutional: Negative for chills and fever.   HENT:        Decreased hearing left ear   Cardiovascular: Positive for chest pain.   Respiratory: Positive for shortness of breath.    Skin: Negative.    Musculoskeletal: Negative.    Gastrointestinal: Positive for diarrhea, nausea and vomiting.   Genitourinary: Negative.    Neurological: Positive for numbness.   Psychiatric/Behavioral: Negative.         Personal History     Past Medical History:   Diagnosis Date   • Bone pain    • COVID-19 virus infection 2022   • Diabetes mellitus (HCC)    • Extremity pain     Carlin. legs pain   • Leg pain     left leg greater   • Migraine    • Pulmonary embolism (HCC)    • Vision loss     doing surgery       Past Surgical History:   Procedure Laterality Date   • BONE MARROW BIOPSY     • BREAST SURGERY     • BRONCHOSCOPY N/A 2022    Procedure: BRONCHOSCOPY bilateral lung washing;  Surgeon: Charlene Camara MD;  Location: [facility] ENDOSCOPY;  Service: Pulmonary;  Laterality: N/A;  post: rule out infection vs transfusion lung injury   •  SECTION     • CHOLECYSTECTOMY     • EYE SURGERY      laser surgery due  to hemmorage--- 2021-- another surgery  lt eye11/15/21   • RETINAL DETACHMENT SURGERY     • SPINE SURGERY      Lombardi spinal block   • TUBAL ABDOMINAL LIGATION         Family History: family history includes Diabetes in her maternal grandmother and mother; Heart attack in her maternal grandmother; Stroke in her maternal grandmother. Otherwise pertinent FHx was  reviewed and not pertinent to current issue.    Social History:  reports that she has never smoked. She has never used smokeless tobacco. She reports that she does not drink alcohol and does not use drugs.    Home Medications:  Prior to Admission Medications     Prescriptions Last Dose Informant Patient Reported? Taking?    allopurinol (ZYLOPRIM) 100 MG tablet   No No    Take 1 tablet by mouth Daily.    ALPRAZolam (Xanax) 0.25 MG tablet   No No    Take 1 tablet by mouth At Night As Needed for Anxiety.    budesonide-formoterol (SYMBICORT) 160-4.5 MCG/ACT inhaler   No No    Inhale 2 puffs 2 (Two) Times a Day.    citalopram (CeleXA) 10 MG tablet   No No    Take 1 tablet by mouth Daily. To begin 1/31/22    fentaNYL (DURAGESIC) 25 MCG/HR patch   No No    Place 1 patch on the skin as directed by provider Every 72 (Seventy-Two) Hours.    folic acid (FOLVITE) 1 MG tablet   No No    Take 1 tablet by mouth Daily.    guaiFENesin (MUCINEX) 600 MG 12 hr tablet   No No    Take 1 tablet by mouth Every 12 (Twelve) Hours.    hydroxyurea (HYDREA) 500 MG capsule   No No    Take 2 capsules by mouth 2 (Two) Times a Day.    hydrOXYzine (ATARAX) 25 MG tablet   No No    Take 1 tablet by mouth Every 6 (Six) Hours As Needed for Itching.    imatinib (GLEEVEC) 100 MG chemo tablet   No No    Take 2 tablets by mouth with 1 other imatinib prescription for 600 mg total Daily. Take with food and large glass of water; Do not take with Grapefruit juice.    imatinib (GLEEVEC) 400 MG chemo tablet   No No    Take 1 tablet by mouth Daily for 30 days. Take with food and glass of water. Do not take with Grapefruit juice.    imatinib (GLEEVEC) 400 MG chemo tablet   No No    Take 1 tablet by mouth with 1 other imatinib prescription for 600 mg total Daily. Take with food and large glass of water; Do not take with Grapefruit juice.    Insulin Glargine (BASAGLAR KWIKPEN) 100 UNIT/ML injection pen   No No    Inject 20 Units under the skin into the appropriate  area as directed Daily.    Insulin Lispro (ADMELOG SOLOSTAR) 100 UNIT/ML injection pen   No No    Inject 6 Units under the skin into the appropriate area as directed 3 (Three) Times a Day.    Insulin Lispro (ADMELOG SOLOSTAR) 100 UNIT/ML injection pen   No No    Inject  6 units under the skin into the appropriate area three times daily as directed.    losartan (COZAAR) 25 MG tablet   No No    Take 1 tablet by mouth Daily.    metoprolol succinate XL (TOPROL-XL) 25 MG 24 hr tablet   Yes No    Take 25 mg by mouth Daily.    mirtazapine (REMERON) 15 MG tablet   No No    Take 1 tablet by mouth Every Night for 30 days. Start Mirtazapine and decrease Trazodone to 1/2 tablet x 14 days and then stop Trazodone.    ondansetron ODT (ZOFRAN-ODT) 8 MG disintegrating tablet   No No    Place 1 tablet on the tongue Every 8 (Eight) Hours As Needed for Nausea or Vomiting.    predniSONE (DELTASONE) 10 MG tablet   No No    Take 2 tablets by mouth Take As Directed. 20 mg BID x 5 days then  20 mg daily x 3 days then  10 mg daily x 3 days, then stop    prochlorperazine (COMPAZINE) 25 MG suppository   No No    Insert 1 suppository into the rectum Every 12 (Twelve) Hours As Needed for Nausea or Vomiting.    rivaroxaban (XARELTO) 20 MG tablet   No No    Take 1 tablet by mouth Daily.            Allergies:  Allergies   Allergen Reactions   • Hydromorphone GI Intolerance     dilaudid   • Morphine Nausea And Vomiting   • Propofol Hives     Previously tolerated being premedicated with diphenhydramine and famotadine       Objective      Vitals:   Temp:  [98.1 °F (36.7 °C)] 98.1 °F (36.7 °C)  Heart Rate:  [] 104  Resp:  [16-24] 19  BP: ()/(62-79) 125/72  Flow (L/min):  [2] 2    Physical Exam  Vitals reviewed.   Constitutional:       Appearance: Normal appearance. She is ill-appearing.   HENT:      Head: Normocephalic and atraumatic.      Nose: Nose normal.      Mouth/Throat:      Mouth: Mucous membranes are moist.      Pharynx: Oropharynx  is clear.   Eyes:      Extraocular Movements: Extraocular movements intact.      Conjunctiva/sclera: Conjunctivae normal.      Pupils: Pupils are equal, round, and reactive to light.   Cardiovascular:      Rate and Rhythm: Normal rate and regular rhythm.      Pulses: Normal pulses.      Heart sounds: Normal heart sounds.   Pulmonary:      Effort: Pulmonary effort is normal.      Breath sounds: Normal breath sounds.      Comments: On room air  Abdominal:      General: Bowel sounds are normal. There is distension.   Musculoskeletal:         General: Normal range of motion.      Cervical back: Normal range of motion.      Right lower leg: Edema present.      Left lower leg: Edema present.      Comments: +2 pitting edema BLE    Skin:     General: Skin is warm and dry.   Neurological:      General: No focal deficit present.      Mental Status: She is alert and oriented to person, place, and time. Mental status is at baseline.   Psychiatric:         Mood and Affect: Mood normal.         Behavior: Behavior normal.         Thought Content: Thought content normal.         Judgment: Judgment normal.         Result Review    Result Review:  I have personally reviewed the results from the time of this admission to 11/3/2022 13:52 EDT and agree with these findings:  [x]  Laboratory  []  Microbiology  [x]  Radiology  [x]  EKG/Telemetry   []  Cardiology/Vascular   []  Pathology  []  Old records  []  Other:  Most notable findings include: CXR showed Cardiomegaly and mild pulmonary vascular congestion which is increase in   the comparison. This may be in part artifactual from low lung volumes   versus underlying pulmonary edema or pneumonia in the correct clinical   Setting.  All vital signs stable upon admission except patient on 2 L nasal cannula of oxygen and blood pressure 94/62.  All labs unremarkable upon admission except blood glucose 411, sodium 134, CO2 19, alk phos 1826, proBNP 29,518, white blood cells elevated,  hemoglobin 7.8.    Assessment & Plan        Active Hospital Problems:  Active Hospital Problems    Diagnosis    • **Chest pain, unspecified type    • Acute diastolic CHF (congestive heart failure) ([facility])    • CML (chronic myelocytic leukemia) ([facility])    • Type 2 diabetes mellitus with diabetic polyneuropathy, with long-term current use of insulin ([facility])    • Depression with anxiety    • Medically noncompliant    • History of pulmonary embolism      Plan:   --- Home medications not verified at time of assessment and plan---    Acute on chronic diastolic heart failure   - CXR reviewed  - Last 2D echo showed EF 41-45%, Left ventricular diastolic function is consistent with (grade Ia w/high LAP) impaired relaxation  - EKG reviewed  - proBNP 29,518   - Troponin 0.01, trend - Aspirin and nitrostat ordered   - Continuous cardiac monitoring   - Daily weights  - Strict I and O  - Received lasix in ED   - Lasix ordered   - Cardiology consulted      CML  - .7, Hbg 7.8- monitor   - On imatinib at home   - Continue fentanyl patch and oxycodone (INSPECT verified)   - Hematology/oncology consulted     Depression with anxiety  - Stable  - Continue remeron, celexa, and xanax (INSPECT verified)     Essential HTN  - BP currently on the lower side  - Monitor blood pressure  - Holding home losartan and metprolol     Type II DM with hyperglycemia  -  upon admission  - Received regular insulin in ED- Recheck since admission  - Start sliding scale, Accuchecks ACHS  - Continue home insulin regimen   - Check Hbg A1C     History of PE  - Continue Xarelto once meds verified, Lovenox ordered for now     Medically non-compliant     DVT prophylaxis: Xarelto     CODE STATUS:    Level Of Support Discussed With: Patient  Code Status (Patient has no pulse and is not breathing): CPR (Attempt to Resuscitate)  Medical Interventions (Patient has pulse or is breathing): Full Support    Admission Status:  I believe this patient meets Inpatient  status.    I discussed the patient's findings and my recommendations with patient and family.    This patient has been examined wearing appropriate Personal Protective Equipment . 11/03/22      Signature: Electronically signed by JP Montilla, 11/03/22, 12:47 PM EDT.

## 2022-11-03 NOTE — CASE MANAGEMENT/SOCIAL WORK
Discharge Planning Assessment   Mt     Patient Name: Nieves Mc  MRN: [id number]  Today's Date: 11/3/2022    Admit Date: 11/3/2022    Plan: Return home with Daughter and Mother   Discharge Needs Assessment     Row Name 11/03/22 1448       Living Environment    People in Home child(tobin), adult;parent(s)    Name(s) of People in Home Mother and Amy    Current Living Arrangements home    Primary Care Provided by self    Provides Primary Care For no one    Family Caregiver if Needed child(tobin), adult;parent(s)    Quality of Family Relationships helpful    Able to Return to Prior Arrangements yes       Resource/Environmental Concerns    Transportation Concerns none       Transition Planning    Patient/Family Anticipates Transition to home with family    Transportation Anticipated family or friend will provide       Discharge Needs Assessment    Readmission Within the Last 30 Days no previous admission in last 30 days    Equipment Currently Used at Home none    Concerns to be Addressed discharge planning               Discharge Plan     Row Name 11/03/22 1473       Plan    Plan Return home with Daughter and Mother    Plan Comments Spoke with patient at bedside, lives with mother and daughter. PCP and Pharmacy confirmed. No transportation or prescription coverage issues.                Expected Discharge Date and Time     Expected Discharge Date Expected Discharge Time    Nov 4, 2022          Demographic Summary     Row Name 11/03/22 1447       General Information    Admission Type inpatient    Arrived From emergency department    Required Notices Provided Important Message from Medicare    Referral Source emergency department    Reason for Consult discharge planning    Preferred Language English               Functional Status     Row Name 11/03/22 1447       Functional Status    Usual Activity Tolerance moderate    Current Activity Tolerance moderate       Functional Status, IADL    Medications independent     Meal Preparation independent    Housekeeping independent    Laundry independent    Shopping assistive person       Mental Status    General Appearance WDL WDL            Patient Forms     Row Name 11/03/22 1448       Patient Forms    Important Message from Medicare ([facility]) Delivered  11/3 Registration         Met with patient in room wearing PPE: mask, face shield/goggles, gloves, gown.      Maintained distance greater than six feet and spent less than 15 minutes in the room.        Elda Kelley RN

## 2022-11-03 NOTE — CONSULTS
Hematology/Oncology Inpatient Consultation    Patient name: Nieves Mc  : 1975  MRN: [id number]  Referring Provider: JP Munoz  Reason for Consultation: CML    Chief complaint: Chest pain    History of present illness:    Nieves Mc is a 47 y.o. female who presented to [facility] on 11/3/2022 with complaints of chest pain.  Past medical history significant for CML, depression with anxiety, PE, type 2 diabetes mellitus.  Patient reported that around 9 AM the day of presentation she began to have chest pain.  Stated it was accompanied by the loss of hearing in her left ear and left arm numbness.  She reported she has never experienced this previous.  Since being in the emergency department the chest pain has subsided.  She describes the chest pain as dull.  She did not take anything at home to try to alleviate it.  She reported chronic leg swelling but denied any recent weight gain.  She reported shortness of breath.  She reported chronic cough.  She denied any fever or chills.  She also complained of nausea, vomiting and diarrhea. She was most recently seen in the emergency department on 10/31/2022 for abdominal pain at the instruction of her oncologist.  A CT of the abdomen and pelvis was fairly unchanged from prior and showed severe generalized anasarca and severe hepatosplenomegaly.  A CT of the chest was performed to rule out pulmonary embolism which was also negative and therefore the patient was discharged home.    Evaluation in the ED showed a negative troponin less than 0.010, elevated proBNP at 29,519, glucose 411, normal kidney function, elevated alk phos 1826.  WBC elevated at 376.70, hemoglobin 7.8, MCV 82.8, platelets 404,000, ANC 48.97, blasts 66%.  EKG showed sinus tachycardia, chest x-ray showed cardiomegaly and mild pulmonary vascular congestion which is increased in comparison.  The patient received Lasix, regular insulin, Nitrostat and a normal saline  250 mL bolus in the ED.  She was admitted to the hospital for further evaluation and treatment.      22  Hematology/Oncology was consulted on a patient known to us and followed in the office by Dr. Lerma for her diagnosis of CML currently on imatinib and hydroxyurea.  Patient has been very noncompliant with treatments.  She is currently on Gleevec 400 mg daily but this was recently increased to 600 mg daily.  She is also supposed to be on hydroxyurea twice a day.  She has been noncompliant with medication intake and also routine follow-up in the office for ongoing care.      He/She  has a past medical history of Bone pain, COVID-19 virus infection (2022), Diabetes mellitus (HCC), Extremity pain, Leg pain, Migraine, Pulmonary embolism (HCC), and Vision loss.    PCP: Alida Latham APRN    History:  Past Medical History:   Diagnosis Date   • Bone pain    • COVID-19 virus infection 2022   • Diabetes mellitus (HCC)    • Extremity pain     Carlin. legs pain   • Leg pain     left leg greater   • Migraine    • Pulmonary embolism (HCC)    • Vision loss     doing surgery   ,   Past Surgical History:   Procedure Laterality Date   • BONE MARROW BIOPSY     • BREAST SURGERY     • BRONCHOSCOPY N/A 2022    Procedure: BRONCHOSCOPY bilateral lung washing;  Surgeon: Charlene Camara MD;  Location: [facility] ENDOSCOPY;  Service: Pulmonary;  Laterality: N/A;  post: rule out infection vs transfusion lung injury   •  SECTION     • CHOLECYSTECTOMY     • EYE SURGERY      laser surgery due  to hemmorage--- 2021-- another surgery  lt eye11/15/21   • RETINAL DETACHMENT SURGERY     • SPINE SURGERY      Lombardi spinal block   • TUBAL ABDOMINAL LIGATION     ,   Family History   Problem Relation Age of Onset   • Diabetes Mother    • Diabetes Maternal Grandmother    • Heart attack Maternal Grandmother    • Stroke Maternal Grandmother    ,   Social History     Tobacco Use   • Smoking status: Never   • Smokeless  tobacco: Never   Vaping Use   • Vaping Use: Never used   Substance Use Topics   • Alcohol use: No   • Drug use: No   ,   Medications Prior to Admission   Medication Sig Dispense Refill Last Dose   • allopurinol (ZYLOPRIM) 100 MG tablet Take 1 tablet by mouth Daily. 30 tablet 0    • ALPRAZolam (Xanax) 0.25 MG tablet Take 1 tablet by mouth At Night As Needed for Anxiety. 30 tablet 0    • budesonide-formoterol (SYMBICORT) 160-4.5 MCG/ACT inhaler Inhale 2 puffs 2 (Two) Times a Day. 10.2 g 1    • citalopram (CeleXA) 10 MG tablet Take 1 tablet by mouth Daily. To begin 1/31/22 30 tablet 2    • fentaNYL (DURAGESIC) 25 MCG/HR patch Place 1 patch on the skin as directed by provider Every 72 (Seventy-Two) Hours. 10 each 0 11/1/2022   • folic acid (FOLVITE) 1 MG tablet Take 1 tablet by mouth Daily. 30 tablet 0    • guaiFENesin (MUCINEX) 600 MG 12 hr tablet Take 1 tablet by mouth Every 12 (Twelve) Hours. 30 tablet 0    • hydroxyurea (HYDREA) 500 MG capsule Take 2 capsules by mouth 2 (Two) Times a Day. 120 capsule 0    • hydroxyurea (HYDREA) 500 MG capsule Take 1 capsule by mouth Daily. Afternoon      • hydrOXYzine (ATARAX) 25 MG tablet Take 1 tablet by mouth Every 6 (Six) Hours As Needed for Itching. 20 tablet 0    • imatinib (GLEEVEC) 400 MG chemo tablet Take 1 tablet by mouth Daily for 30 days. Take with food and glass of water. Do not take with Grapefruit juice. 30 tablet 5    • Insulin Glargine (BASAGLAR KWIKPEN) 100 UNIT/ML injection pen Inject 20 Units under the skin into the appropriate area as directed Daily. 10 mL 3    • Insulin Lispro (ADMELOG SOLOSTAR) 100 UNIT/ML injection pen Inject 6 Units under the skin into the appropriate area as directed 3 (Three) Times a Day. 3 mL 3    • losartan (COZAAR) 25 MG tablet Take 1 tablet by mouth Daily. 30 tablet 0    • metoprolol succinate XL (TOPROL-XL) 25 MG 24 hr tablet Take 25 mg by mouth Daily.      • mirtazapine (REMERON) 15 MG tablet Take 1 tablet by mouth Every Night for 30  days. Start Mirtazapine and decrease Trazodone to 1/2 tablet x 14 days and then stop Trazodone. 30 tablet 2    • ondansetron ODT (ZOFRAN-ODT) 8 MG disintegrating tablet Place 1 tablet on the tongue Every 8 (Eight) Hours As Needed for Nausea or Vomiting. 20 tablet 2    • imatinib (GLEEVEC) 100 MG chemo tablet Take 2 tablets by mouth with 1 other imatinib prescription for 600 mg total Daily. Take with food and large glass of water; Do not take with Grapefruit juice. 60 tablet 2    • imatinib (GLEEVEC) 400 MG chemo tablet Take 1 tablet by mouth with 1 other imatinib prescription for 600 mg total Daily. Take with food and large glass of water; Do not take with Grapefruit juice. 30 tablet 2    , Scheduled Meds:  allopurinol, 100 mg, Oral, Daily  aspirin, 325 mg, Oral, Daily  budesonide-formoterol, 2 puff, Inhalation, BID - RT  citalopram, 10 mg, Oral, Daily  enoxaparin, 40 mg, Subcutaneous, Q24H  [START ON 11/4/2022] fentaNYL, 1 patch, Transdermal, Q72H  folic acid, 1 mg, Oral, Daily  furosemide, 40 mg, Intravenous, BID  guaiFENesin, 600 mg, Oral, Q12H  hydroxyurea, 1,000 mg, Oral, BID  hydroxyurea, 500 mg, Oral, Daily  insulin glargine, 30 Units, Subcutaneous, Daily  insulin lispro, 10 Units, Subcutaneous, TID AC  insulin lispro, 2-9 Units, Subcutaneous, TID With Meals  mirtazapine, 15 mg, Oral, Nightly  sodium chloride, 250 mL, Intravenous, Once  sodium chloride, 10 mL, Intravenous, Q12H    , Continuous Infusions:   , PRN Meds:  •  acetaminophen **OR** acetaminophen **OR** acetaminophen  •  ALPRAZolam  •  aluminum-magnesium hydroxide-simethicone  •  dextrose  •  dextrose  •  glucagon (human recombinant)  •  influenza vaccine  •  melatonin  •  nitroglycerin  •  ondansetron **OR** ondansetron  •  potassium chloride **OR** potassium chloride **OR** potassium chloride  •  sodium chloride  •  sodium chloride   Allergies:  Hydromorphone, Morphine, and Propofol    Subjective     ROS:  Review of Systems   Constitutional:  "Positive for fatigue. Negative for chills and fever.   HENT: Negative for congestion, drooling, ear discharge, rhinorrhea, sinus pressure and tinnitus.    Eyes: Negative for photophobia, pain and discharge.   Respiratory: Negative for apnea, choking and stridor.    Cardiovascular: Negative for palpitations.   Gastrointestinal: Negative for abdominal distention, abdominal pain and anal bleeding.   Endocrine: Negative for polydipsia and polyphagia.   Genitourinary: Negative for decreased urine volume, flank pain and genital sores.   Musculoskeletal: Negative for gait problem, neck pain and neck stiffness.   Skin: Negative for color change, rash and wound.   Neurological: Positive for weakness. Negative for tremors, seizures, syncope, facial asymmetry and speech difficulty.   Hematological: Negative for adenopathy.   Psychiatric/Behavioral: Negative for agitation, confusion, hallucinations and self-injury. The patient is not hyperactive.         Objective   Vital Signs:   /75 (BP Location: Left arm, Patient Position: Lying)   Pulse 105   Temp 98.4 °F (36.9 °C) (Oral)   Resp 18   Ht 162.6 cm (64\")   Wt 68 kg (150 lb)   LMP 05/25/2022 (Approximate)   SpO2 92%   BMI 25.75 kg/m²     Physical Exam: (performed by MD)  Physical Exam  Vitals and nursing note reviewed.   Constitutional:       General: She is in acute distress.      Appearance: She is ill-appearing. She is not diaphoretic.   HENT:      Head: Normocephalic and atraumatic.   Eyes:      General: No scleral icterus.        Right eye: No discharge.         Left eye: No discharge.      Conjunctiva/sclera: Conjunctivae normal.   Neck:      Thyroid: No thyromegaly.   Cardiovascular:      Rate and Rhythm: Normal rate and regular rhythm.      Heart sounds: Normal heart sounds.     No friction rub. No gallop.   Pulmonary:      Effort: Respiratory distress present.      Breath sounds: No stridor. No wheezing.   Abdominal:      General: Bowel sounds are normal. "      Palpations: Abdomen is soft. There is no mass.      Tenderness: There is no abdominal tenderness. There is no guarding or rebound.   Musculoskeletal:         General: No tenderness. Normal range of motion.      Cervical back: Normal range of motion and neck supple.   Lymphadenopathy:      Cervical: No cervical adenopathy.   Skin:     General: Skin is warm.      Findings: No erythema or rash.   Neurological:      Mental Status: She is alert and oriented to person, place, and time.      Motor: No abnormal muscle tone.   Psychiatric:         Behavior: Behavior normal.         Results Review:  Lab Results (last 48 hours)     Procedure Component Value Units Date/Time    POC Glucose Once [[id number]]  (Abnormal) Collected: 11/03/22 1256    Specimen: Blood Updated: 11/03/22 1258     Glucose 337 mg/dL      Comment: Serial Number: [id number]:  [id number]       Troponin [[id number]]  (Normal) Collected: 11/03/22 1150    Specimen: Blood Updated: 11/03/22 1217     Troponin T <0.010 ng/mL     Narrative:      Troponin T Reference Range:  <= 0.03 ng/mL-   Negative for AMI  >0.03 ng/mL-     Abnormal for myocardial necrosis.  Clinicians would have to utilize clinical acumen, EKG, Troponin and serial changes to determine if it is an Acute Myocardial Infarction or myocardial injury due to an underlying chronic condition.       Results may be falsely decreased if patient taking Biotin.      [place] Draw [[id number]] Collected: 11/03/22 0957    Specimen: Blood Updated: 11/03/22 1102    Narrative:      The following orders were created for panel order [place] Draw.  Procedure                               Abnormality         Status                     ---------                               -----------         ------                     Green Top (Gel)[[id number]]                                  Final result               Lavender Top[[id number]]                                     Final result               Gold Top -  SST[[id number]]                                   Final result               Light Blue Top[[id number]]                                   Final result                 Please view results for these tests on the individual orders.    Gold Top - SST [[id number]] Collected: 11/03/22 0957    Specimen: Blood from Arm, Right Updated: 11/03/22 1102     Extra Tube Hold for add-ons.     Comment: Auto resulted.       Green Top (Gel) [[id number]] Collected: 11/03/22 0957    Specimen: Blood from Arm, Right Updated: 11/03/22 1102     Extra Tube Hold for add-ons.     Comment: Auto resulted.       Light Blue Top [[id number]] Collected: 11/03/22 0957    Specimen: Blood Updated: 11/03/22 1102     Extra Tube Hold for add-ons.     Comment: Auto resulted       Lavender Top [[id number]] Collected: 11/03/22 0957    Specimen: Blood from Arm, Right Updated: 11/03/22 1102     Extra Tube hold for add-on     Comment: Auto resulted       Comprehensive Metabolic Panel [[id number]]  (Abnormal) Collected: 11/03/22 0957    Specimen: Blood from Arm, Right Updated: 11/03/22 1057     Glucose 411 mg/dL      BUN 11 mg/dL      Creatinine 0.96 mg/dL      Sodium 134 mmol/L      Potassium 4.5 mmol/L      Chloride 96 mmol/L      CO2 19.0 mmol/L      Calcium 8.4 mg/dL      Total Protein 6.8 g/dL      Albumin 3.40 g/dL      ALT (SGPT) 11 U/L      AST (SGOT) 22 U/L      Alkaline Phosphatase 1,826 U/L      Total Bilirubin 1.2 mg/dL      Globulin 3.4 gm/dL      A/G Ratio 1.0 g/dL      BUN/Creatinine Ratio 11.5     Anion Gap 19.0 mmol/L      eGFR 73.6 mL/min/1.73      Comment: National Kidney Foundation and American Society of Nephrology (ASN) Task Force recommended calculation based on the Chronic Kidney Disease Epidemiology Collaboration (CKD-EPI) equation refit without adjustment for race.       Narrative:      GFR Normal >60  Chronic Kidney Disease <60  Kidney Failure <15      Manual Differential [[id number]]  (Abnormal) Collected: 11/03/22 0957    Specimen: Blood  from Arm, Right Updated: 11/03/22 1041     Neutrophil % 12.0 %      Lymphocyte % 1.0 %      Eosinophil % 2.0 %      Basophil % 13.0 %      Bands %  1.0 %      Metamyelocyte % 1.0 %      Myelocyte % 4.0 %      Blasts % 66.0 %      Neutrophils Absolute 48.97 10*3/mm3      Lymphocytes Absolute 3.77 10*3/mm3      Eosinophils Absolute 7.53 10*3/mm3      Basophils Absolute 48.97 10*3/mm3      Polychromasia Slight/1+     Rouleaux Mod/2+     Smudge Cells Slight/1+     Giant Platelets Slight/1+    Narrative:      Reviewed by Pathologist within the past 60 days on [id number] .      CBC & Differential [[id number]]  (Abnormal) Collected: 11/03/22 0957    Specimen: Blood from Arm, Right Updated: 11/03/22 1041    Narrative:      The following orders were created for panel order CBC & Differential.  Procedure                               Abnormality         Status                     ---------                               -----------         ------                     CBC Auto Differential[[id number]]        Abnormal            Final result               Scan Slide[[id number]]                                       Final result                 Please view results for these tests on the individual orders.    Scan Slide [[id number]] Collected: 11/03/22 0957    Specimen: Blood from Arm, Right Updated: 11/03/22 1041     Scan Slide --     Comment: See Manual Differential Results       CBC Auto Differential [[id number]]  (Abnormal) Collected: 11/03/22 0957    Specimen: Blood from Arm, Right Updated: 11/03/22 1041     .70 10*3/mm3      RBC 3.30 10*6/mm3      Hemoglobin 7.8 g/dL      Hematocrit 27.3 %      MCV 82.8 fL      MCH 23.8 pg      MCHC 28.7 g/dL      RDW 19.0 %      RDW-SD 53.8 fl      MPV 8.5 fL      Platelets 404 10*3/mm3     Narrative:      Modified report. Previous result was Hemogram on 11/3/2022 at 1016 EDT.  The previously reported component NRBC is no longer being reported. Previous result was 0.1 /100 WBC (Reference  Range: 0.0-0.2 /100 WBC) on 11/3/2022 at 1016 EDT.    Troponin [[id number]]  (Normal) Collected: 11/03/22 0957    Specimen: Blood from Arm, Right Updated: 11/03/22 1033     Troponin T <0.010 ng/mL     Narrative:      Troponin T Reference Range:  <= 0.03 ng/mL-   Negative for AMI  >0.03 ng/mL-     Abnormal for myocardial necrosis.  Clinicians would have to utilize clinical acumen, EKG, Troponin and serial changes to determine if it is an Acute Myocardial Infarction or myocardial injury due to an underlying chronic condition.       Results may be falsely decreased if patient taking Biotin.      BNP [[id number]]  (Abnormal) Collected: 11/03/22 0957    Specimen: Blood from Arm, Right Updated: 11/03/22 1030     proBNP 29,518.0 pg/mL     Narrative:      Among patients with dyspnea, NT-proBNP is highly sensitive for the detection of acute congestive heart failure. In addition NT-proBNP of <300 pg/ml effectively rules out acute congestive heart failure with 99% negative predictive value.    Results may be falsely decreased if patient taking Biotin.             Pending Results: Flow Cytometry    Imaging Reviewed:   CT Abdomen Pelvis Without Contrast    Result Date: 10/31/2022   1. Severe generalized anasarca. 2. Features of mild interstitial and alveolar edema in the imaged lung bases. 3. Severe hepatosplenomegaly, similar to 6/1/2022. 4. Mild to moderate cardiomegaly, without significant change. 5. Cholecystectomy. 6. Small quantity lower abdominal and pelvic ascites, increased since 6/20/2022.    Electronically Signed By-Heather Kelsey MD On:10/31/2022 2:52 PM This report was finalized on [id number] by  Heather Kelsey MD.    XR Chest 1 View    Result Date: 11/3/2022  IMPRESSION : Cardiomegaly and mild pulmonary vascular congestion which is increase in the comparison. This may be in part artifactual from low lung volumes versus underlying pulmonary edema or pneumonia in the correct clinical setting[  Electronically Signed  By-Freddy Toribio On:11/3/2022 10:26 AM This report was finalized on [id number] by  Freddy Toribio, .    XR Chest 1 View    Result Date: 10/31/2022  1.     Improved aeration throughout the lungs bilaterally. There are mild residual hazy groundglass densities throughout the lungs with mild interstitial changes. These finds suggest mild underlying chronic pulmonary edema. Stable cardiomegaly is noted.  Electronically Signed By-Amador Marquez MD On:10/31/2022 2:34 PM This report was finalized on [id number] by  Amador Marquez MD.    CT Angiogram Chest Pulmonary Embolism    Result Date: 10/31/2022   1. No pulmonary embolus. 2. There is dilated cardiomegaly and small pleural effusion, not significant changed. A tiny left pleural effusion is also present. 3. There is extensive anasarca and hepatosplenomegaly. Diffuse groundglass opacities are present bilaterally. The overall appearance suggests third spacing of fluid and/or volume overload. No airspace consolidations.  Electronically Signed By-Yobany Ng DO On:10/31/2022 5:32 PM This report was finalized on [id number] by  Yobany Ng DO.           Assessment & Plan   ASSESSMENT  1. CML not in remission: Currently on imatinib with plan to increase dose to 600 mg daily at the last visit.  On hydroxyurea 1 g twice a day.  2. Anemia: Secondary to CML and TKI, hydroxyurea  3. Hyperleukocytosis: Secondary to CML  4. History of pulmonary embolism: On Xarelto as an outpatient. Lovenox currently.   5. Acute on chronic diastolic heart failure: Management per primary team with cardiology consulted  6. Multiple chronic conditions: Type 2 diabetes, hypertension, depression with anxiety.    PLAN  1. Monitor CBC daily  2. Check Flow Cytometry  3. Continue Gleevec and Hydroxyurea    Electronically signed by JP Varela, 11/03/22, 1:14 PM EDT.    Thank you for this consult. We will be happy to follow along with you.        Hematology/Oncology was  consulted on a patient known to us and followed in the office by Dr. Lerma for her diagnosis of CML currently on imatinib and hydroxyurea.  Patient has been very noncompliant with treatments.  She is currently on Gleevec 400 mg daily but this was recently increased to 600 mg daily.  She is also supposed to be on hydroxyurea twice a day.  She has been noncompliant with medication intake and also routine follow-up in the office for ongoing care.  Patient has had progressive shortness of breath and swelling on her lower extremities.    On her physical exam she appears chronically ill in acute distress respiratory distress as well.  I reviewed her labs, imaging studies and progress notes  Transfuse packed red blood cells  Begin Gleevac 400 mg once available to the hospital  Continue hydroxyurea  Continue management of her CHF  Peripheral blood flow cytometry has been sent off  Discussed with patient that she may be going into blast crisis and the indications for chemotherapy.  Her last 2D echo completed in June 2022 showed an EF of 41 to 45%  Discussed that her condition is serious    Continue TKI    Electronically signed by Meghann Lerma MD, 11/04/22, 2:56 PM EDT.

## 2022-11-03 NOTE — ED PROVIDER NOTES
Subjective   History of Present Illness  Chief Complaint: Chest pain    Patient is a 47-year-old  female history of CML, CHF diabetes, migraines presents to the ER per EMS with complaints of chest pain.  Patient states that she was laying in bed at home when she had sudden onset of midsternal and left-sided chest pain.  She describes as a pressure and heaviness that radiates to her left shoulder and down her left arm.  She states the pain has been constant.  She currently rates it a 10/10.  Patient received aspirin per EMS but blood pressure was too low to receive nitro, per EMS.  She reports some shortness of breath and some nausea.  Patient does have some abdominal distention but states this is unchanged compared to her normal.  She does feel like her extremities are more swollen than her normal.  Patient states that she does not take Lasix at home.  No paresthesias or weakness.  She denies any headache lightheadedness or dizziness.  No blurry vision.  No fever chills.  No previous cardiac history.  Of note patient was recently seen in the ER, 4 days ago, due to abnormal lab work.  Patient did have CT PE protocol of the chest as well as CT abdomen and pelvis at that time, no pulmonary embolisms were noted. CT abdomen pelvis shows severe generalized anasarca, severe hepatosplenomegaly.    Location: Chest    Quality: Pressure    Duration: Today    Timing: Constant    Severity: Moderate    Associated Symptoms: Shortness of breath, lower extremity swelling    PCP: Alida Latham    History provided by:  Patient      Review of Systems   Constitutional: Negative for chills and fever.   HENT: Negative for congestion, sore throat and trouble swallowing.    Eyes: Negative.    Respiratory: Positive for chest tightness and shortness of breath. Negative for cough and wheezing.    Cardiovascular: Positive for chest pain and leg swelling.   Gastrointestinal: Positive for abdominal distention and nausea. Negative for  abdominal pain, diarrhea and vomiting.   Endocrine: Negative.    Genitourinary: Negative for dysuria.   Musculoskeletal: Negative for myalgias.   Skin: Negative for rash.   Allergic/Immunologic: Negative.    Neurological: Negative for dizziness, weakness and headaches.   Psychiatric/Behavioral: Negative for behavioral problems.   All other systems reviewed and are negative.      Past Medical History:   Diagnosis Date   • Bone pain    • COVID-19 virus infection 2022   • Diabetes mellitus (HCC)    • Extremity pain     Carlin. legs pain   • Leg pain     left leg greater   • Migraine    • Pulmonary embolism (HCC)    • Vision loss     doing surgery       Allergies   Allergen Reactions   • Hydromorphone GI Intolerance     dilaudid   • Morphine Nausea And Vomiting   • Propofol Hives     Previously tolerated being premedicated with diphenhydramine and famotadine       Past Surgical History:   Procedure Laterality Date   • BONE MARROW BIOPSY     • BREAST SURGERY     • BRONCHOSCOPY N/A 2022    Procedure: BRONCHOSCOPY bilateral lung washing;  Surgeon: Charlene Camara MD;  Location: [facility];  Service: Pulmonary;  Laterality: N/A;  post: rule out infection vs transfusion lung injury   •  SECTION     • CHOLECYSTECTOMY     • EYE SURGERY      laser surgery due  to hemmorage--- 2021-- another surgery  lt eye11/15/21   • RETINAL DETACHMENT SURGERY     • SPINE SURGERY      Lombardi spinal block   • TUBAL ABDOMINAL LIGATION         Family History   Problem Relation Age of Onset   • Diabetes Mother    • Diabetes Maternal Grandmother    • Heart attack Maternal Grandmother    • Stroke Maternal Grandmother        Social History     Socioeconomic History   • Marital status:    Tobacco Use   • Smoking status: Never   • Smokeless tobacco: Never   Vaping Use   • Vaping Use: Never used   Substance and Sexual Activity   • Alcohol use: No   • Drug use: No   • Sexual activity: Defer           Objective   Physical  Exam  Vitals and nursing note reviewed.   Constitutional:       Appearance: Normal appearance. She is well-developed and normal weight. She is not ill-appearing or toxic-appearing.   HENT:      Head: Normocephalic and atraumatic.   Eyes:      Pupils: Pupils are equal, round, and reactive to light.   Cardiovascular:      Rate and Rhythm: Regular rhythm. Tachycardia present.      Pulses:           Radial pulses are 2+ on the right side and 2+ on the left side.        Dorsalis pedis pulses are 2+ on the right side and 2+ on the left side.      Heart sounds: Normal heart sounds. No murmur heard.  Pulmonary:      Effort: Pulmonary effort is normal. No accessory muscle usage or respiratory distress.      Breath sounds: Examination of the right-lower field reveals rales. Examination of the left-lower field reveals rales. Rales present. No wheezing.   Chest:      Chest wall: No mass, tenderness, crepitus or edema.   Abdominal:      General: Bowel sounds are normal. There is no distension.      Palpations: Abdomen is soft. There is no splenomegaly.      Tenderness: There is no abdominal tenderness. There is no rebound.      Comments: Distention   Musculoskeletal:         General: Normal range of motion.      Cervical back: Normal range of motion.      Right lower leg: Tenderness present. Edema present.      Left lower leg: Tenderness present. Edema present.   Skin:     General: Skin is warm and dry.      Capillary Refill: Capillary refill takes less than 2 seconds.      Findings: No erythema or rash.   Neurological:      General: No focal deficit present.      Mental Status: She is alert and oriented to person, place, and time.   Psychiatric:         Mood and Affect: Mood normal.         Behavior: Behavior normal.         ECG 12 Lead      Date/Time: 11/3/2022 10:47 AM  Performed by: Micaela Winkler PA  Authorized by: Randal Powers DO   Interpreted by physician  Comparison: compared with previous ECG from  "10/31/2022  Similar to previous ECG  Comparison to previous ECG: Sinus tachycardia, rate of 104  Rhythm: sinus tachycardia  Rate: tachycardic  BPM: 111  Conduction: conduction normal  ST Segments: ST segments normal  T Waves: T waves normal  Clinical impression: non-specific ECG                 ED Course  ED Course as of 11/03/22 2110   u Nov 03, 2022   0950 Patient was seen 3 days ago in the ER at this facility.  Patient did have CT PE protocol that was negative for pulmonary embolism. []   1003 Patient already received aspirin per EMS. []   1005 Patient blood pressure initially hypotensive, repeat 117/73.  Patient will be given full 1 inch nitro patch.  Hold 250 bolus for now.  This was discussed with primary nurse []   1206 Spoke with NADINE Brewer who agreed accept patient for Dr. Juarez []      ED Course User Index  [] Peterson, SIENA Bingham    /73 (BP Location: Left arm, Patient Position: Lying)   Pulse 104   Temp 98.5 °F (36.9 °C) (Oral)   Resp 17   Ht 162.6 cm (64\")   Wt 68 kg (150 lb)   LMP 05/25/2022 (Approximate)   SpO2 96%   BMI 25.75 kg/m²   Labs Reviewed   COMPREHENSIVE METABOLIC PANEL - Abnormal; Notable for the following components:       Result Value    Glucose 411 (*)     Sodium 134 (*)     Chloride 96 (*)     CO2 19.0 (*)     Calcium 8.4 (*)     Albumin 3.40 (*)     Alkaline Phosphatase 1,826 (*)     Anion Gap 19.0 (*)     All other components within normal limits    Narrative:     GFR Normal >60  Chronic Kidney Disease <60  Kidney Failure <15     BNP (IN-HOUSE) - Abnormal; Notable for the following components:    proBNP 29,518.0 (*)     All other components within normal limits    Narrative:     Among patients with dyspnea, NT-proBNP is highly sensitive for the detection of acute congestive heart failure. In addition NT-proBNP of <300 pg/ml effectively rules out acute congestive heart failure with 99% negative predictive value.    Results may be falsely decreased if patient " taking Biotin.     CBC WITH AUTO DIFFERENTIAL - Abnormal; Notable for the following components:    .70 (*)     RBC 3.30 (*)     Hemoglobin 7.8 (*)     Hematocrit 27.3 (*)     MCH 23.8 (*)     MCHC 28.7 (*)     RDW 19.0 (*)     All other components within normal limits    Narrative:     Modified report. Previous result was Hemogram on 11/3/2022 at 1016 EDT.  The previously reported component NRBC is no longer being reported. Previous result was 0.1 /100 WBC (Reference Range: 0.0-0.2 /100 WBC) on 11/3/2022 at 1016 EDT.   MANUAL DIFFERENTIAL - Abnormal; Notable for the following components:    Neutrophil % 12.0 (*)     Lymphocyte % 1.0 (*)     Basophil % 13.0 (*)     Metamyelocyte % 1.0 (*)     Myelocyte % 4.0 (*)     Blasts % 66.0 (*)     Neutrophils Absolute 48.97 (*)     Lymphocytes Absolute 3.77 (*)     Eosinophils Absolute 7.53 (*)     Basophils Absolute 48.97 (*)     All other components within normal limits    Narrative:     Reviewed by Pathologist within the past 60 days on [id number] .     POCT GLUCOSE FINGERSTICK - Abnormal; Notable for the following components:    Glucose 337 (*)     All other components within normal limits   POCT GLUCOSE FINGERSTICK - Abnormal; Notable for the following components:    Glucose 274 (*)     All other components within normal limits   POCT GLUCOSE FINGERSTICK - Abnormal; Notable for the following components:    Glucose 229 (*)     All other components within normal limits   TROPONIN (IN-HOUSE) - Normal    Narrative:     Troponin T Reference Range:  <= 0.03 ng/mL-   Negative for AMI  >0.03 ng/mL-     Abnormal for myocardial necrosis.  Clinicians would have to utilize clinical acumen, EKG, Troponin and serial changes to determine if it is an Acute Myocardial Infarction or myocardial injury due to an underlying chronic condition.       Results may be falsely decreased if patient taking Biotin.     TROPONIN (IN-HOUSE) - Normal    Narrative:     Troponin T Reference Range:  <=  0.03 ng/mL-   Negative for AMI  >0.03 ng/mL-     Abnormal for myocardial necrosis.  Clinicians would have to utilize clinical acumen, EKG, Troponin and serial changes to determine if it is an Acute Myocardial Infarction or myocardial injury due to an underlying chronic condition.       Results may be falsely decreased if patient taking Biotin.     RAINBOW DRAW    Narrative:     The following orders were created for panel order [place] Draw.  Procedure                               Abnormality         Status                     ---------                               -----------         ------                     Green Top (Gel)[[id number]]                                  Final result               Lavender Top[[id number]]                                     Final result               Gold Top - SST[[id number]]                                   Final result               Light Blue Top[[id number]]                                   Final result                 Please view results for these tests on the individual orders.   SCAN SLIDE   POCT GLUCOSE FINGERSTICK   POCT GLUCOSE FINGERSTICK   POCT GLUCOSE FINGERSTICK   POCT GLUCOSE FINGERSTICK   POCT GLUCOSE FINGERSTICK   FLOW CYTOMETRY   CBC AND DIFFERENTIAL    Narrative:     The following orders were created for panel order CBC & Differential.  Procedure                               Abnormality         Status                     ---------                               -----------         ------                     CBC Auto Differential[[id number]]        Abnormal            Final result               Scan Slide[[id number]]                                       Final result                 Please view results for these tests on the individual orders.   GREEN TOP   LAVENDER TOP   GOLD TOP - SST   LIGHT BLUE TOP     Medications   sodium chloride 0.9 % flush 10 mL (has no administration in time range)   sodium chloride 0.9 % bolus 250 mL (0 mL Intravenous Hold 11/3/22 1022)    nitroglycerin (NITROSTAT) SL tablet 0.4 mg (has no administration in time range)   aspirin EC tablet 325 mg (325 mg Oral Given 11/3/22 1315)   furosemide (LASIX) injection 40 mg (40 mg Intravenous Given 11/3/22 1735)   sodium chloride 0.9 % flush 10 mL (10 mL Intravenous Given 11/3/22 1651)   sodium chloride 0.9 % flush 10 mL (has no administration in time range)   acetaminophen (TYLENOL) tablet 650 mg (has no administration in time range)     Or   acetaminophen (TYLENOL) 160 MG/5ML solution 650 mg (has no administration in time range)     Or   acetaminophen (TYLENOL) suppository 650 mg (has no administration in time range)   aluminum-magnesium hydroxide-simethicone (MAALOX MAX) 400-400-40 MG/5ML suspension 15 mL (has no administration in time range)   ondansetron (ZOFRAN) tablet 4 mg (has no administration in time range)     Or   ondansetron (ZOFRAN) injection 4 mg (has no administration in time range)   melatonin tablet 5 mg (has no administration in time range)   Enoxaparin Sodium (LOVENOX) syringe 40 mg (40 mg Subcutaneous Given 11/3/22 1650)   potassium chloride (K-DUR,KLOR-CON) CR tablet 40 mEq (has no administration in time range)     Or   potassium chloride (KLOR-CON) packet 40 mEq (has no administration in time range)     Or   potassium chloride 10 mEq in 100 mL IVPB (has no administration in time range)   dextrose (GLUTOSE) oral gel 15 g (has no administration in time range)   dextrose (D50W) (25 g/50 mL) IV injection 25 g (has no administration in time range)   glucagon (human recombinant) (GLUCAGEN DIAGNOSTIC) 1 mg in sterile water (preservative free) 1 mL injection (has no administration in time range)   insulin lispro (ADMELOG) injection 2-9 Units (4 Units Subcutaneous Given 11/3/22 1832)   allopurinol (ZYLOPRIM) tablet 100 mg (100 mg Oral Given 11/3/22 1651)   budesonide-formoterol (SYMBICORT) 160-4.5 MCG/ACT inhaler 2 puff (2 puffs Inhalation Not Given 11/3/22 2051)   citalopram (CeleXA) tablet 10  mg (10 mg Oral Given 11/3/22 1651)   ALPRAZolam (XANAX) tablet 0.25 mg (has no administration in time range)   fentaNYL (DURAGESIC) 25 MCG/HR patch 1 patch (has no administration in time range)   folic acid (FOLVITE) tablet 1 mg (1 mg Oral Given 11/3/22 1651)   guaiFENesin (MUCINEX) 12 hr tablet 600 mg (has no administration in time range)   hydroxyurea (HYDREA) capsule 1,000 mg (has no administration in time range)   hydroxyurea (HYDREA) capsule 500 mg (500 mg Oral Given 11/3/22 1832)   insulin glargine (LANTUS, SEMGLEE) injection 30 Units (30 Units Subcutaneous Given 11/3/22 1651)   insulin lispro (ADMELOG) injection 10 Units (10 Units Subcutaneous Given 11/3/22 1652)   mirtazapine (REMERON) tablet 15 mg (has no administration in time range)   influenza vac split quad (FLUZONE,FLUARIX,AFLURIA,FLULAVAL) injection 0.5 mL (has no administration in time range)   nitroglycerin (NITROSTAT) ointment 1 inch (0.5 inches Topical Given 11/3/22 1021)   insulin regular (humuLIN R,novoLIN R) injection 4 Units (4 Units Subcutaneous Given 11/3/22 1150)   furosemide (LASIX) injection 40 mg (40 mg Intravenous Given 11/3/22 1155)     XR Chest 1 View    Result Date: 11/3/2022  IMPRESSION : Cardiomegaly and mild pulmonary vascular congestion which is increase in the comparison. This may be in part artifactual from low lung volumes versus underlying pulmonary edema or pneumonia in the correct clinical setting[  Electronically Signed By-Freddy Toribio On:11/3/2022 10:26 AM This report was finalized on [id number] by  Freddy Toribio, .                      HEART Score: 3                      MDM  Number of Diagnoses or Management Options  Acute pulmonary edema (HCC)  Chest pain, unspecified type  Dyspnea, unspecified type  Leukocytosis, unspecified type  Diagnosis management comments: MEDICAL DECISION  Epic Chart Review: Patient recently seen in the ER 4 days ago for complaints of abnormal blood work.  Significant leukocytosis.   She is followed by Dr. Lerma.  Patient's most recent admission 7/27/2022 for complaints of chest pain and shortness of breath.  Patient was found to have pneumonitis bilaterally.  2D Echoh 6/30/22  · The left ventricular cavity is mildly dilated.  · Left ventricular wall thickness is consistent with mild concentric hypertrophy.  · Left ventricular ejection fraction appears to be 41 - 45%.  · Left ventricular diastolic function is consistent with (grade Ia w/high LAP) impaired relaxation.    Comorbidities: CML, diabetes, migraines, previous PEs  Differentials: Angina, STEMI, pulmonary edema, CHF exacerbation; this list is not all inclusive and does not constitute the entirety of considered causes  Radiology interpretation:  Images reviewed by me and interpreted by radiologist, as above  Lab interpretation:  Labs viewed by me significant for, as above  EKG interpretation: Reviewed by ER attending, sinus tachycardia with a rate of 111 with no acute ST changes.  Similar to previous.    While in the ED IV was placed and labs were obtained appropriate PPE was worn during exam and throughout all encounters with the patient.  Patient had the above evaluation.  IV established, lab work obtained.  Patient placed on continuous telemetry monitoring throughout ER stay.  Patient given aspirin per EMS.  Patient blood pressure was rechecked on arrival, and improved and she was given nitro patch.  She reports significant improvement in her chest pain from 10/10 to 5/10 after nitro patch was placed.  EKG shows sinus tachycardia with a rate of 111 with no acute ST changes.  Troponin less than 0.01.  CBC hyperleukocytosis 376.  This is similar compared to patient's most recent blood work 4 days ago but is still increasing.  CMP glucose 411, sodium 134, chloride 96, CO2 19.0, calcium 8.4, alk phos 1826.  Patient given Humulin for elevated glucose.  BNP significantly elevated 29,518, this is increased compared to BNP 4 days ago, 16,000.   Patient given IV Lasix 40 mg.  Chest x-ray shows cardiomegaly and mild pulmonary vascular congestion which is increased compared to the paracent.  Patient also has bilateral lower extremity swelling.  Patient will be admitted for further evaluation for hyperleukocytosis likely due to known CML as well as chest pain dyspnea.  I spoke with NADINE Brewer who agreed to accept patient for admission.  Patient had otherwise unremarkable ER stay.         Amount and/or Complexity of Data Reviewed  Clinical lab tests: reviewed and ordered  Tests in the radiology section of CPT®: reviewed and ordered  Tests in the medicine section of CPT®: reviewed  Decide to obtain previous medical records or to obtain history from someone other than the patient: yes    Patient Progress  Patient progress: stable      Final diagnoses:   Chest pain, unspecified type   Acute pulmonary edema (HCC)   Dyspnea, unspecified type   Leukocytosis, unspecified type       ED Disposition  ED Disposition     ED Disposition   Decision to Admit    Condition   --    Comment   Level of Care: Telemetry [5]   Diagnosis: Chest pain, unspecified type [[id number]]   Admitting Physician: ADIN CONTE [[id number]]   Attending Physician: ADIN CONTE [[id number]]   Certification: I Certify That Inpatient Hospital Services Are Medically Necessary For Greater Than 2 Midnights               No follow-up provider specified.       Medication List      ASK your doctor about these medications    Insulin Lispro 100 UNIT/ML injection pen  Commonly known as: ADMELOG SOLOSTAR  Inject 6 Units under the skin into the appropriate area as directed 3 (Three) Times a Day.  Ask about: Which instructions should I use?             Micaela Winkler PA  11/03/22 [id number]

## 2022-11-04 LAB
ABO GROUP BLD: NORMAL
ANISOCYTOSIS BLD QL: ABNORMAL
ANTI-E: NORMAL
BIG S ANTIGEN: NEGATIVE
BLASTS NFR BLD MANUAL: 40 % (ref 0–0)
BLD GP AB SCN SERPL QL: POSITIVE
DEPRECATED RDW RBC AUTO: 52.9 FL (ref 37–54)
EOSINOPHIL # BLD MANUAL: 10.55 10*3/MM3 (ref 0–0.4)
EOSINOPHIL NFR BLD MANUAL: 3 % (ref 0.3–6.2)
ERYTHROCYTE [DISTWIDTH] IN BLOOD BY AUTOMATED COUNT: 18.5 % (ref 12.3–15.4)
GIANT PLATELETS: ABNORMAL
GLUCOSE BLDC GLUCOMTR-MCNC: 117 MG/DL (ref 70–105)
GLUCOSE BLDC GLUCOMTR-MCNC: 136 MG/DL (ref 70–105)
GLUCOSE BLDC GLUCOMTR-MCNC: 72 MG/DL (ref 70–105)
GLUCOSE BLDC GLUCOMTR-MCNC: 97 MG/DL (ref 70–105)
HCT VFR BLD AUTO: 25.7 % (ref 34–46.6)
HGB BLD-MCNC: 7.2 G/DL (ref 12–15.9)
LYMPHOCYTES # BLD MANUAL: 42.19 10*3/MM3 (ref 0.7–3.1)
LYMPHOCYTES NFR BLD MANUAL: 2 % (ref 5–12)
Lab: NORMAL
MCH RBC QN AUTO: 24.1 PG (ref 26.6–33)
MCHC RBC AUTO-ENTMCNC: 28.2 G/DL (ref 31.5–35.7)
MCV RBC AUTO: 85.3 FL (ref 79–97)
MONOCYTES # BLD: 7.03 10*3/MM3 (ref 0.1–0.9)
NEUTROPHILS # BLD AUTO: 151.19 10*3/MM3 (ref 1.7–7)
NEUTROPHILS NFR BLD MANUAL: 40 % (ref 42.7–76)
NEUTS BAND NFR BLD MANUAL: 3 % (ref 0–5)
PLATELET # BLD AUTO: 410 10*3/MM3 (ref 140–450)
PMV BLD AUTO: 8.6 FL (ref 6–12)
POIKILOCYTOSIS BLD QL SMEAR: ABNORMAL
RBC # BLD AUTO: 3.01 10*6/MM3 (ref 3.77–5.28)
RH BLD: POSITIVE
SCAN SLIDE: NORMAL
SMALL PLATELETS BLD QL SMEAR: ABNORMAL
T&S EXPIRATION DATE: NORMAL
VARIANT LYMPHS NFR BLD MANUAL: 12 % (ref 19.6–45.3)
WBC MORPH BLD: NORMAL
WBC NRBC COR # BLD: 351.6 10*3/MM3 (ref 3.4–10.8)

## 2022-11-04 PROCEDURE — 99232 SBSQ HOSP IP/OBS MODERATE 35: CPT | Performed by: INTERNAL MEDICINE

## 2022-11-04 PROCEDURE — 86905 BLOOD TYPING RBC ANTIGENS: CPT | Performed by: INTERNAL MEDICINE

## 2022-11-04 PROCEDURE — 82962 GLUCOSE BLOOD TEST: CPT

## 2022-11-04 PROCEDURE — 85007 BL SMEAR W/DIFF WBC COUNT: CPT | Performed by: NURSE PRACTITIONER

## 2022-11-04 PROCEDURE — 86922 COMPATIBILITY TEST ANTIGLOB: CPT

## 2022-11-04 PROCEDURE — 36430 TRANSFUSION BLD/BLD COMPNT: CPT

## 2022-11-04 PROCEDURE — 86901 BLOOD TYPING SEROLOGIC RH(D): CPT | Performed by: INTERNAL MEDICINE

## 2022-11-04 PROCEDURE — 86902 BLOOD TYPE ANTIGEN DONOR EA: CPT

## 2022-11-04 PROCEDURE — 97165 OT EVAL LOW COMPLEX 30 MIN: CPT

## 2022-11-04 PROCEDURE — 63710000001 INSULIN GLARGINE PER 5 UNITS: Performed by: NURSE PRACTITIONER

## 2022-11-04 PROCEDURE — 25010000002 ENOXAPARIN PER 10 MG: Performed by: NURSE PRACTITIONER

## 2022-11-04 PROCEDURE — P9016 RBC LEUKOCYTES REDUCED: HCPCS

## 2022-11-04 PROCEDURE — 25010000002 FUROSEMIDE PER 20 MG: Performed by: NURSE PRACTITIONER

## 2022-11-04 PROCEDURE — 86900 BLOOD TYPING SEROLOGIC ABO: CPT | Performed by: INTERNAL MEDICINE

## 2022-11-04 PROCEDURE — 86900 BLOOD TYPING SEROLOGIC ABO: CPT

## 2022-11-04 PROCEDURE — 86870 RBC ANTIBODY IDENTIFICATION: CPT | Performed by: INTERNAL MEDICINE

## 2022-11-04 PROCEDURE — 85025 COMPLETE CBC W/AUTO DIFF WBC: CPT | Performed by: NURSE PRACTITIONER

## 2022-11-04 PROCEDURE — 86850 RBC ANTIBODY SCREEN: CPT | Performed by: INTERNAL MEDICINE

## 2022-11-04 RX ORDER — HYDROCODONE BITARTRATE AND ACETAMINOPHEN 5; 325 MG/1; MG/1
1 TABLET ORAL EVERY 6 HOURS PRN
Status: DISCONTINUED | OUTPATIENT
Start: 2022-11-04 | End: 2022-11-04

## 2022-11-04 RX ORDER — HYDROXYUREA 500 MG/1
500 CAPSULE ORAL ONCE
Status: COMPLETED | OUTPATIENT
Start: 2022-11-04 | End: 2022-11-04

## 2022-11-04 RX ORDER — FUROSEMIDE 10 MG/ML
20 INJECTION INTRAMUSCULAR; INTRAVENOUS AS NEEDED
Status: ACTIVE | OUTPATIENT
Start: 2022-11-04 | End: 2022-11-05

## 2022-11-04 RX ORDER — HYDROCODONE BITARTRATE AND ACETAMINOPHEN 7.5; 325 MG/1; MG/1
1 TABLET ORAL EVERY 6 HOURS PRN
Status: DISPENSED | OUTPATIENT
Start: 2022-11-04 | End: 2022-11-11

## 2022-11-04 RX ORDER — IMATINIB MESYLATE 400 MG/1
400 TABLET, FILM COATED ORAL DAILY
Status: DISCONTINUED | OUTPATIENT
Start: 2022-11-04 | End: 2022-11-08

## 2022-11-04 RX ORDER — FUROSEMIDE 10 MG/ML
20 INJECTION INTRAMUSCULAR; INTRAVENOUS
Status: DISCONTINUED | OUTPATIENT
Start: 2022-11-04 | End: 2022-11-07

## 2022-11-04 RX ADMIN — FENTANYL 1 PATCH: 25 PATCH, EXTENDED RELEASE TRANSDERMAL at 10:33

## 2022-11-04 RX ADMIN — MIRTAZAPINE 15 MG: 15 TABLET, FILM COATED ORAL at 21:48

## 2022-11-04 RX ADMIN — ASPIRIN 325 MG: 325 TABLET, COATED ORAL at 08:22

## 2022-11-04 RX ADMIN — HYDROXYUREA 500 MG: 500 CAPSULE ORAL at 18:22

## 2022-11-04 RX ADMIN — INSULIN GLARGINE 30 UNITS: 100 INJECTION, SOLUTION SUBCUTANEOUS at 08:22

## 2022-11-04 RX ADMIN — ENOXAPARIN SODIUM 40 MG: 100 INJECTION SUBCUTANEOUS at 16:44

## 2022-11-04 RX ADMIN — HYDROCODONE BITARTRATE AND ACETAMINOPHEN 1 TABLET: 5; 325 TABLET ORAL at 14:47

## 2022-11-04 RX ADMIN — GUAIFENESIN 600 MG: 600 TABLET, EXTENDED RELEASE ORAL at 21:48

## 2022-11-04 RX ADMIN — HYDROCODONE BITARTRATE AND ACETAMINOPHEN 1 TABLET: 7.5; 325 TABLET ORAL at 21:48

## 2022-11-04 RX ADMIN — Medication 10 ML: at 08:23

## 2022-11-04 RX ADMIN — IMATINIB 400 MG: 400 TABLET ORAL at 14:10

## 2022-11-04 RX ADMIN — GUAIFENESIN 600 MG: 600 TABLET, EXTENDED RELEASE ORAL at 08:22

## 2022-11-04 RX ADMIN — FUROSEMIDE 20 MG: 10 INJECTION, SOLUTION INTRAMUSCULAR; INTRAVENOUS at 10:33

## 2022-11-04 RX ADMIN — CITALOPRAM HYDROBROMIDE 10 MG: 20 TABLET ORAL at 08:22

## 2022-11-04 RX ADMIN — HYDROXYUREA 1000 MG: 500 CAPSULE ORAL at 08:24

## 2022-11-04 RX ADMIN — FUROSEMIDE 20 MG: 10 INJECTION, SOLUTION INTRAMUSCULAR; INTRAVENOUS at 17:22

## 2022-11-04 RX ADMIN — Medication 10 ML: at 21:48

## 2022-11-04 RX ADMIN — ALLOPURINOL 100 MG: 100 TABLET ORAL at 08:22

## 2022-11-04 RX ADMIN — FOLIC ACID 1 MG: 1 TABLET ORAL at 08:23

## 2022-11-04 RX ADMIN — HYDROXYUREA 1000 MG: 500 CAPSULE ORAL at 21:48

## 2022-11-04 NOTE — SIGNIFICANT NOTE
11/04/22 1406   OTHER   Discipline physical therapist   Rehab Time/Intention   Session Not Performed other (see comments)  (Discussed with pt current mobility.  She is up ad alessandro in her room without assist.  She has been going to/from the restroom independently.  Due to this PT will sign off on pt at this time.  Discussed with pt if she begins to decline we can get new orders.)   Therapy Assessment/Plan (PT)   Criteria for Skilled Interventions Met (PT) no;no problems identified which require skilled intervention

## 2022-11-04 NOTE — PROGRESS NOTES
Hematology/Oncology Inpatient Progress Note    PATIENT NAME: Nieves Mc  : 1975  MRN: [id number]    CHIEF COMPLAINT: Chest pain    HISTORY OF PRESENT ILLNESS:    Nieves Mc is a 47 y.o. female who presented to [facility] on 11/3/2022 with complaints of chest pain.  Past medical history significant for CML, depression with anxiety, PE, type 2 diabetes mellitus.  Patient reported that around 9 AM the day of presentation she began to have chest pain.  Stated it was accompanied by the loss of hearing in her left ear and left arm numbness.  She reported she has never experienced this previous.  Since being in the emergency department the chest pain has subsided.  She describes the chest pain as dull.  She did not take anything at home to try to alleviate it.  She reported chronic leg swelling but denied any recent weight gain.  She reported shortness of breath.  She reported chronic cough.  She denied any fever or chills.  She also complained of nausea, vomiting and diarrhea. She was most recently seen in the emergency department on 10/31/2022 for abdominal pain at the instruction of her oncologist.  A CT of the abdomen and pelvis was fairly unchanged from prior and showed severe generalized anasarca and severe hepatosplenomegaly.  A CT of the chest was performed to rule out pulmonary embolism which was also negative and therefore the patient was discharged home.     Evaluation in the ED showed a negative troponin less than 0.010, elevated proBNP at 29,519, glucose 411, normal kidney function, elevated alk phos 1826.  WBC elevated at 376.70, hemoglobin 7.8, MCV 82.8, platelets 404,000, ANC 48.97, blasts 66%.  EKG showed sinus tachycardia, chest x-ray showed cardiomegaly and mild pulmonary vascular congestion which is increased in comparison.  The patient received Lasix, regular insulin, Nitrostat and a normal saline 250 mL bolus in the ED.  She was admitted to the hospital for further  evaluation and treatment.        11/03/22  Hematology/Oncology was consulted on a patient known to us and followed in the office by Dr. Lerma for her diagnosis of CML currently on imatinib and hydroxyurea.  Patient was initially seen in consultation in October 2018 while she was inpatient at [facility] with CML.  At that time she was under the care of Dr. Roach and  and was managed on imatinib.  Patient had a repeat bone marrow aspiration and biopsy in December 2018 that was consistent with chronic myeloid leukemia.  BCR ABL positive, chronic phase.  ABL kinase mutational analysis negative.  Patient was initiated on Tasigna in February 2019 but this was discontinued in June 2022 due to diagnosis of cardiomyopathy.  At that time it was determined that the imatinib would be the safer treatment option and she was reinitiated on this.  Her course has been complicated due to noncompliance and difficulty tolerating TKI's.     He/She  has a past medical history of Bone pain, COVID-19 virus infection (01/12/2022), Diabetes mellitus (HCC), Extremity pain, Leg pain, Migraine, Pulmonary embolism (HCC), and Vision loss.     PCP: Alida Latham APRN    Subjective      No changes overnight    ROS:    Review of Systems   Constitutional: Positive for fatigue. Negative for chills and fever.   HENT: Negative for congestion, drooling, ear discharge, rhinorrhea, sinus pressure and tinnitus.    Eyes: Negative for photophobia, pain and discharge.   Respiratory: Positive for cough and shortness of breath. Negative for apnea, choking and stridor.    Cardiovascular: Positive for leg swelling. Negative for palpitations.   Gastrointestinal: Negative for abdominal distention, abdominal pain and anal bleeding.   Endocrine: Negative for polydipsia and polyphagia.   Genitourinary: Negative for decreased urine volume, flank pain and genital sores.   Musculoskeletal: Negative for gait problem, neck pain and neck stiffness.   Skin: Negative for  "color change, rash and wound.   Neurological: Positive for weakness. Negative for tremors, seizures, syncope, facial asymmetry and speech difficulty.   Hematological: Negative for adenopathy.   Psychiatric/Behavioral: Negative for agitation, confusion, hallucinations and self-injury. The patient is not hyperactive.         MEDICATIONS:    Scheduled Meds:  allopurinol, 100 mg, Oral, Daily  aspirin, 325 mg, Oral, Daily  budesonide-formoterol, 2 puff, Inhalation, BID - RT  citalopram, 10 mg, Oral, Daily  enoxaparin, 40 mg, Subcutaneous, Q24H  fentaNYL, 1 patch, Transdermal, Q72H  folic acid, 1 mg, Oral, Daily  furosemide, 20 mg, Intravenous, BID  guaiFENesin, 600 mg, Oral, Q12H  hydroxyurea, 1,000 mg, Oral, BID  hydroxyurea, 500 mg, Oral, Daily  imatinib, 400 mg, Oral, Daily  insulin glargine, 30 Units, Subcutaneous, Daily  insulin lispro, 10 Units, Subcutaneous, TID AC  insulin lispro, 2-9 Units, Subcutaneous, TID With Meals  mirtazapine, 15 mg, Oral, Nightly  sodium chloride, 250 mL, Intravenous, Once  sodium chloride, 10 mL, Intravenous, Q12H       Continuous Infusions:      PRN Meds:  •  acetaminophen **OR** acetaminophen **OR** acetaminophen  •  ALPRAZolam  •  aluminum-magnesium hydroxide-simethicone  •  dextrose  •  dextrose  •  furosemide  •  glucagon (human recombinant)  •  HYDROcodone-acetaminophen  •  influenza vaccine  •  melatonin  •  nitroglycerin  •  ondansetron **OR** ondansetron  •  potassium chloride **OR** potassium chloride **OR** potassium chloride  •  sodium chloride  •  sodium chloride     ALLERGIES:    Allergies   Allergen Reactions   • Hydromorphone GI Intolerance     dilaudid   • Morphine Nausea And Vomiting   • Propofol Hives     Previously tolerated being premedicated with diphenhydramine and famotadine       Objective    VITALS:   /82 (BP Location: Left arm, Patient Position: Lying)   Pulse 96   Temp 98.2 °F (36.8 °C) (Oral)   Resp 16   Ht 162.6 cm (64\")   Wt 68 kg (150 lb)   LMP " 05/25/2022 (Approximate)   SpO2 96%   BMI 25.75 kg/m²     PHYSICAL EXAM: (performed by MD)  Physical Exam  Vitals and nursing note reviewed.   Constitutional:       General: She is not in acute distress.     Appearance: She is not diaphoretic.   HENT:      Head: Normocephalic and atraumatic.   Eyes:      General: No scleral icterus.        Right eye: No discharge.         Left eye: No discharge.      Conjunctiva/sclera: Conjunctivae normal.   Neck:      Thyroid: No thyromegaly.   Cardiovascular:      Rate and Rhythm: Normal rate and regular rhythm.      Heart sounds: Normal heart sounds.     No friction rub. No gallop.   Pulmonary:      Effort: Pulmonary effort is normal. No respiratory distress.      Breath sounds: No stridor. Rales present. No wheezing.   Abdominal:      General: Bowel sounds are normal.      Palpations: Abdomen is soft. There is no mass.      Tenderness: There is no abdominal tenderness. There is no guarding or rebound.   Musculoskeletal:         General: No tenderness. Normal range of motion.      Cervical back: Normal range of motion and neck supple.   Lymphadenopathy:      Cervical: No cervical adenopathy.   Skin:     General: Skin is warm.      Findings: No erythema or rash.   Neurological:      Mental Status: She is alert and oriented to person, place, and time.      Motor: No abnormal muscle tone.   Psychiatric:         Behavior: Behavior normal.           RECENT LABS:  Lab Results (last 24 hours)     Procedure Component Value Units Date/Time    POC Glucose Once [[id number]]  (Abnormal) Collected: 11/04/22 1159    Specimen: Blood Updated: 11/04/22 1201     Glucose 117 mg/dL      Comment: Serial Number: [id number]:  [id number]       POC Glucose Once [[id number]]  (Normal) Collected: 11/04/22 0811    Specimen: Blood Updated: 11/04/22 0812     Glucose 97 mg/dL      Comment: Serial Number: [id number]:  [id number]       POC Glucose Once [[id number]]  (Normal) Collected: 11/04/22 0731     Specimen: Blood Updated: 11/04/22 0732     Glucose 72 mg/dL      Comment: Serial Number: [id number]:  [id number]       Manual Differential [[id number]]  (Abnormal) Collected: 11/04/22 0305    Specimen: Blood Updated: 11/04/22 0602     Neutrophil % 40.0 %      Lymphocyte % 12.0 %      Monocyte % 2.0 %      Eosinophil % 3.0 %      Bands %  3.0 %      Blasts % 40.0 %      Neutrophils Absolute 151.19 10*3/mm3      Lymphocytes Absolute 42.19 10*3/mm3      Monocytes Absolute 7.03 10*3/mm3      Eosinophils Absolute 10.55 10*3/mm3      Anisocytosis Slight/1+     Poikilocytes Slight/1+     WBC Morphology Normal     Platelet Estimate Increased     Giant Platelets Slight/1+    CBC & Differential [[id number]]  (Abnormal) Collected: 11/04/22 0305    Specimen: Blood Updated: 11/04/22 0602    Narrative:      The following orders were created for panel order CBC & Differential.  Procedure                               Abnormality         Status                     ---------                               -----------         ------                     CBC Auto Differential[[id number]]        Abnormal            Final result               Scan Slide[[id number]]                                       Final result                 Please view results for these tests on the individual orders.    Scan Slide [[id number]] Collected: 11/04/22 0305    Specimen: Blood Updated: 11/04/22 0602     Scan Slide --     Comment: See Manual Differential Results       CBC Auto Differential [[id number]]  (Abnormal) Collected: 11/04/22 0305    Specimen: Blood Updated: 11/04/22 0602     .60 10*3/mm3      RBC 3.01 10*6/mm3      Hemoglobin 7.2 g/dL      Hematocrit 25.7 %      MCV 85.3 fL      MCH 24.1 pg      MCHC 28.2 g/dL      RDW 18.5 %      RDW-SD 52.9 fl      MPV 8.6 fL      Platelets 410 10*3/mm3     Narrative:      Modified report. Previous result was Hemogram on 11/4/2022 at 0433 EDT.  The previously reported component NRBC is no longer being  reported. Previous result was 0.1 /100 WBC (Reference Range: 0.0-0.2 /100 WBC) on 11/4/2022 at 0433 EDT.    POC Glucose Once [[id number]]  (Abnormal) Collected: 11/03/22 1753    Specimen: Blood Updated: 11/03/22 1755     Glucose 229 mg/dL      Comment: Serial Number: [id number]:  [id number]       Flow Cytometry [[id number]] Collected: 11/03/22 1643    Specimen: Blood Updated: 11/03/22 1650          PENDING RESULTS: Flow cytometry    IMAGING REVIEWED:  XR Chest 1 View    Result Date: 11/3/2022  IMPRESSION : Cardiomegaly and mild pulmonary vascular congestion which is increase in the comparison. This may be in part artifactual from low lung volumes versus underlying pulmonary edema or pneumonia in the correct clinical setting[  Electronically Signed By-Freddy Toribio On:11/3/2022 10:26 AM This report was finalized on [id number] by  Freddy Toribio, .      Assessment & Plan      ASSESSMENT:  1. CML not in remission: Currently on imatinib with plan to increase dose to 600 mg daily at last visit.  On hydroxyurea 1 g twice a day. Patient has been noncompliant with her medicines  2. Anemia: Secondary to CML and TKI, hydroxyurea  3. Hyperleukocytosis: Secondary to CML  4. History of pulmonary embolism: On Xarelto as an outpatient.  Lovenox currently.  5. Acute on chronic diastolic heart failure: Management per primary team and cardiology.  6. Multiple chronic conditions: Type 2 diabetes mellitus, hypertension, depression with anxiety.    PLAN:  1. Monitor CBC daily-1 unit pRBC today for hemoglobin of 7.2 has been ordered  2. Await results of flow cytometry  3. Continue Gleevec 600 mg p.o. daily and hydroxyurea 1 g twice a day  4. We will request a psychiatry consult to evaluate her depression and anxiety as factors for her noncompliance with medications           Hematology/Oncology was consulted on a patient known to us and followed in the office by Dr. Lerma for her diagnosis of CML currently on imatinib and  hydroxyurea.  Patient has been very noncompliant with treatments.  She is currently on Gleevec 400 mg daily but this was recently increased to 600 mg daily.  She is also supposed to be on hydroxyurea twice a day.  She has been noncompliant with medication intake and also routine follow-up in the office for ongoing care.  Patient has had progressive shortness of breath and swelling on her lower extremities.    On her physical exam she appears chronically ill in acute distress respiratory distress as well.  I reviewed her labs, imaging studies and progress notes  Transfuse packed red blood cells  Begin Gleevac 400 mg once available to the hospital.  Patient has been encouraged to have her family bring in the medication  Continue hydroxyurea  Daily CBC with differential  Continue management of her CHF, she may need 2D echocardiogram.  Cardiologist is following  Peripheral blood flow cytometry has been sent off  Discussed with patient that she may be going into blast crisis and the indications for chemotherapy.  Her last 2D echo completed in June 2022 showed an EF of 41 to 45%  Discussed that her condition is serious    Continue TKI    Electronically signed by Meghann Lerma MD, 11/04/22, 3:12 PM EDT.

## 2022-11-04 NOTE — PLAN OF CARE
Goal Outcome Evaluation:  Plan of Care Reviewed With: patient        Progress: no change  Outcome Evaluation: Pt is 46 y/o F with acute on chroinc leukemia admitted with acute chest pain and Lt arm numbness which have resolved at this time. Pt has anemia, some fluid overload, and ABD anascara. She is being diuresed. She will have transfusion this date. She is on 2L O2 and removes the nasal cannula with resulting drop to 89%. Otherwise pt appears to be functioning at her baseline, is up ad alessandro, eating well, and has no concerns about d/c home with her mother & dtr. Recommend D/C home. Possible need for wlaking oximetry prior to d/c, but no further acute OT needs identified.

## 2022-11-04 NOTE — PROGRESS NOTES
[facility] Medicine Services Daily Progress Note    Patient Name: Nieves Mc  : 1975  MRN: [id number]  Primary Care Physician:  Alida Latham APRN  Date of admission: 11/3/2022      Subjective      Brief interim history: 47-year-old female with nonischemic cardiomyopathy, HFpEF, PE, anxiety, T2DM, history of medical noncompliance, depression/anxiety and CML.  She was admitted on 11/3/2022, presented to [facility] ED, complaining of chest pain that started at around 9 AM while she was at home with her .  Chest pain is described as dull in nature, radiating to the left with paresthesia.  She denies shortness of breath, no diaphoresis, no dizziness or lightheadedness.  Cardiac biomarkers with troponin negative x3 and EKG shows sinus tachycardia.  Laboratories notable for proBNP of 62364 and chest x-ray showed cardiomegaly.  In ED, she was treated with nitroglycerin with improvement.  Patient was seen in consultation by cardiologist, and hematologist with recommendations. Patient is admitted with atypical chest pain chest pain, rule out for ACS.    2022: Seen and examined in follow evaluation.  Chest pain-free and resting comfortably in bed.          Objective      Vitals:   Temp:  [98.2 °F (36.8 °C)-98.5 °F (36.9 °C)] 98.3 °F (36.8 °C)  Heart Rate:  [] 100  Resp:  [16-18] 18  BP: (111-130)/(69-86) 130/86  Flow (L/min):  [2] 2    Physical Exam  Constitutional:       General: She is not in acute distress.     Appearance: Normal appearance. She is obese. She is not ill-appearing.   HENT:      Head: Normocephalic.      Nose: Nose normal.      Mouth/Throat:      Mouth: Mucous membranes are moist.   Eyes:      Pupils: Pupils are equal, round, and reactive to light.   Cardiovascular:      Rate and Rhythm: Normal rate.      Pulses: Normal pulses.   Pulmonary:      Effort: Pulmonary effort is normal.   Abdominal:      Palpations: Abdomen is soft.   Musculoskeletal:          General: No swelling or tenderness. Normal range of motion.      Cervical back: Neck supple.   Skin:     General: Skin is warm.   Neurological:      General: No focal deficit present.      Mental Status: She is alert.   Psychiatric:         Mood and Affect: Mood normal.             Result Review    Result Review:  I have personally reviewed the results from the time of this admission to 11/4/2022 16:26 EDT and agree with these findings:  []  Laboratory  []  Microbiology  []  Radiology  []  EKG/Telemetry   []  Cardiology/Vascular   []  Pathology  []  Old records  []  Other:  Most notable findings include:       Assessment & Plan        allopurinol, 100 mg, Oral, Daily  aspirin, 325 mg, Oral, Daily  budesonide-formoterol, 2 puff, Inhalation, BID - RT  citalopram, 10 mg, Oral, Daily  enoxaparin, 40 mg, Subcutaneous, Q24H  fentaNYL, 1 patch, Transdermal, Q72H  folic acid, 1 mg, Oral, Daily  furosemide, 20 mg, Intravenous, BID  guaiFENesin, 600 mg, Oral, Q12H  hydroxyurea, 1,000 mg, Oral, BID  hydroxyurea, 500 mg, Oral, Daily  imatinib, 400 mg, Oral, Daily  insulin glargine, 30 Units, Subcutaneous, Daily  insulin lispro, 10 Units, Subcutaneous, TID AC  insulin lispro, 2-9 Units, Subcutaneous, TID With Meals  mirtazapine, 15 mg, Oral, Nightly  sodium chloride, 250 mL, Intravenous, Once  sodium chloride, 10 mL, Intravenous, Q12H             Active Hospital Problems:  Active Hospital Problems    Diagnosis    • **Chest pain, unspecified type    • Acute diastolic CHF (congestive heart failure) ([facility])    • NICM (nonischemic cardiomyopathy) ([facility])    • Type 2 diabetes mellitus with diabetic polyneuropathy, with long-term current use of insulin ([facility])    • Depression with anxiety    • Medically noncompliant    • History of pulmonary embolism    • CML (chronic myelocytic leukemia) ([facility])      Assessment/plan:    Chest pain (atypical)      -resolved, cont aspirin/losartan /metoprolol at home    Nonischemic cardiomyopathy        -see  below, metoprolol/losartan at home (currently on hold) due to soft BP     Acute on chronic HFrEF(2D echo on 6/22, with estimated EF of 45%)        -elevated BNP 29,000         -Lasix    History of pulmonary embolus       -stable    DM       -Lantus/SSI    CML      -Gleevec/hydroxyurea    Anxiety/depression       -Remeron/Celexa  Medical noncompliance      -continue to encourage adherence and compliance to treatment      DVT prophylaxis:  Medical DVT prophylaxis orders are present.    CODE STATUS:    Level Of Support Discussed With: Patient  Code Status (Patient has no pulse and is not breathing): CPR (Attempt to Resuscitate)  Medical Interventions (Patient has pulse or is breathing): Full Support      Disposition:  I expect patient to be discharged     Electronically signed by Refugio Rod MD, 11/04/22, 16:26 EDT.  [facility] Hospitalist Team

## 2022-11-04 NOTE — CASE MANAGEMENT/SOCIAL WORK
Continued Stay Note  [facility]     Patient Name: Nieves Mc  MRN: [id number]  Today's Date: 11/4/2022    Admit Date: 11/3/2022    Plan: D/C Plan: Anticipate home with daughter and mother. Watch for home oxygen needs at d/c.   Discharge Plan     Row Name 11/04/22 1237       Plan    Plan D/C Plan: Anticipate home with daughter and mother. Watch for home oxygen needs at d/c.    Plan Comments Barrier to D/C: hgb7.2-1 unit PRBCs today, heme/onc and cardiology consults, 2L O2.                    Expected Discharge Date and Time     Expected Discharge Date Expected Discharge Time    Nov 7, 2022         Phone communication or documentation only - no physical contact with patient or family.    DELORES PackN, RN    [place] [id number]    Office: [phone]  Fax: [phone]

## 2022-11-04 NOTE — CONSULTS
"[facility] [place] [facility] [facility]   CONSULT NOTE    Referring Provider: Dr. Rod    Patient Care Team:  Alida Latham APRN as PCP - General (Nurse Practitioner)  Meghann Lerma MD as Consulting Physician (Hematology and Oncology)    Reason for Consultation: chest pain    Chief complaint: chest pain    History of present illness:  Nieves Mc is a 47 y.o. female with past medical history of      CML  Non-ischemic cardiomyopathy  Acute diastolic CHF  Anxiety  Pulmonary embolus  Type 2 diabetes  Family history of heart disease  Chemotherapy  Allergy to morphine, hydromorphone, propofol    who presented to the ED with chest pain. Patient states she was at home and had a headache with decreased hearing from left ear. She reports her heart felt \"like it was gonna explode\" and had accompanying left arm numbness. She states she began to get clammy and hot. Family then called EMS to bring her to ED. She was given nitro paste in the ED which she states helped chest pain. She reports increased edema in abdomen and lower extremities. She also reports some recent diarrhea and nausea. She was seen in the ED on 10/31/2022 for elevated WBC, but infection was ruled out. Patient denies any current chest pain, palpitations, dizziness, lightheadedness.    Labs reviewed serial troponin negative, proBNP 29,518, .6, Hgb 7.2  CT Chest negative for PE, small pleural effusion unchanged from before, extensive anasarca  CXR cardiomegaly and mild pulmonary vascular congestion    Review of Systems   Constitutional: Positive for malaise/fatigue. Negative for fever.   HENT: Positive for hearing loss.    Cardiovascular: Positive for chest pain and leg swelling. Negative for irregular heartbeat and palpitations.   Respiratory: Positive for shortness of breath. Negative for cough.    Gastrointestinal: Positive for bloating, abdominal pain, nausea and vomiting.   Neurological: Positive for headaches and " numbness. Negative for dizziness and light-headedness.   Psychiatric/Behavioral: The patient is nervous/anxious.    All other systems reviewed and are negative.      History  Past Medical History:   Diagnosis Date   • Bone pain    • COVID-19 virus infection 2022   • Diabetes mellitus (HCC)    • Extremity pain     Carlin. legs pain   • Leg pain     left leg greater   • Migraine    • Pulmonary embolism (HCC)    • Vision loss     doing surgery       Past Surgical History:   Procedure Laterality Date   • BONE MARROW BIOPSY     • BREAST SURGERY     • BRONCHOSCOPY N/A 2022    Procedure: BRONCHOSCOPY bilateral lung washing;  Surgeon: Charlene Camara MD;  Location: [facility] ENDOSCOPY;  Service: Pulmonary;  Laterality: N/A;  post: rule out infection vs transfusion lung injury   •  SECTION     • CHOLECYSTECTOMY     • EYE SURGERY      laser surgery due  to hemmorage--- 2021-- another surgery  lt eye11/15/21   • RETINAL DETACHMENT SURGERY     • SPINE SURGERY      Lombardi spinal block   • TUBAL ABDOMINAL LIGATION         Family History   Problem Relation Age of Onset   • Diabetes Mother    • Diabetes Maternal Grandmother    • Heart attack Maternal Grandmother    • Stroke Maternal Grandmother        Social History     Tobacco Use   • Smoking status: Never   • Smokeless tobacco: Never   Vaping Use   • Vaping Use: Never used   Substance Use Topics   • Alcohol use: No   • Drug use: No        Medications Prior to Admission   Medication Sig Dispense Refill Last Dose   • allopurinol (ZYLOPRIM) 100 MG tablet Take 1 tablet by mouth Daily. 30 tablet 0    • ALPRAZolam (Xanax) 0.25 MG tablet Take 1 tablet by mouth At Night As Needed for Anxiety. 30 tablet 0    • budesonide-formoterol (SYMBICORT) 160-4.5 MCG/ACT inhaler Inhale 2 puffs 2 (Two) Times a Day. 10.2 g 1    • citalopram (CeleXA) 10 MG tablet Take 1 tablet by mouth Daily. To begin 22 30 tablet 2    • fentaNYL (DURAGESIC) 25 MCG/HR patch Place 1 patch on the skin  as directed by provider Every 72 (Seventy-Two) Hours. 10 each 0 11/1/2022   • folic acid (FOLVITE) 1 MG tablet Take 1 tablet by mouth Daily. 30 tablet 0    • guaiFENesin (MUCINEX) 600 MG 12 hr tablet Take 1 tablet by mouth Every 12 (Twelve) Hours. 30 tablet 0    • hydroxyurea (HYDREA) 500 MG capsule Take 2 capsules by mouth 2 (Two) Times a Day. 120 capsule 0    • hydroxyurea (HYDREA) 500 MG capsule Take 1 capsule by mouth Daily. Afternoon      • hydrOXYzine (ATARAX) 25 MG tablet Take 1 tablet by mouth Every 6 (Six) Hours As Needed for Itching. 20 tablet 0    • imatinib (GLEEVEC) 400 MG chemo tablet Take 1 tablet by mouth Daily for 30 days. Take with food and glass of water. Do not take with Grapefruit juice. 30 tablet 5    • Insulin Glargine (BASAGLAR KWIKPEN) 100 UNIT/ML injection pen Inject 20 Units under the skin into the appropriate area as directed Daily. 10 mL 3    • Insulin Lispro (ADMELOG SOLOSTAR) 100 UNIT/ML injection pen Inject 6 Units under the skin into the appropriate area as directed 3 (Three) Times a Day. 3 mL 3    • losartan (COZAAR) 25 MG tablet Take 1 tablet by mouth Daily. 30 tablet 0    • metoprolol succinate XL (TOPROL-XL) 25 MG 24 hr tablet Take 25 mg by mouth Daily.      • mirtazapine (REMERON) 15 MG tablet Take 1 tablet by mouth Every Night for 30 days. Start Mirtazapine and decrease Trazodone to 1/2 tablet x 14 days and then stop Trazodone. 30 tablet 2    • ondansetron ODT (ZOFRAN-ODT) 8 MG disintegrating tablet Place 1 tablet on the tongue Every 8 (Eight) Hours As Needed for Nausea or Vomiting. 20 tablet 2    • imatinib (GLEEVEC) 100 MG chemo tablet Take 2 tablets by mouth with 1 other imatinib prescription for 600 mg total Daily. Take with food and large glass of water; Do not take with Grapefruit juice. 60 tablet 2    • imatinib (GLEEVEC) 400 MG chemo tablet Take 1 tablet by mouth with 1 other imatinib prescription for 600 mg total Daily. Take with food and large glass of water; Do not  "take with Grapefruit juice. 30 tablet 2          Hydromorphone, Morphine, and Propofol    Scheduled Meds:  allopurinol, 100 mg, Oral, Daily  aspirin, 325 mg, Oral, Daily  budesonide-formoterol, 2 puff, Inhalation, BID - RT  citalopram, 10 mg, Oral, Daily  enoxaparin, 40 mg, Subcutaneous, Q24H  fentaNYL, 1 patch, Transdermal, Q72H  folic acid, 1 mg, Oral, Daily  furosemide, 20 mg, Intravenous, BID  guaiFENesin, 600 mg, Oral, Q12H  hydroxyurea, 1,000 mg, Oral, BID  hydroxyurea, 500 mg, Oral, Daily  insulin glargine, 30 Units, Subcutaneous, Daily  insulin lispro, 10 Units, Subcutaneous, TID AC  insulin lispro, 2-9 Units, Subcutaneous, TID With Meals  mirtazapine, 15 mg, Oral, Nightly  sodium chloride, 250 mL, Intravenous, Once  sodium chloride, 10 mL, Intravenous, Q12H        Continuous Infusions:       PRN Meds:  •  acetaminophen **OR** acetaminophen **OR** acetaminophen  •  ALPRAZolam  •  aluminum-magnesium hydroxide-simethicone  •  dextrose  •  dextrose  •  glucagon (human recombinant)  •  influenza vaccine  •  melatonin  •  nitroglycerin  •  ondansetron **OR** ondansetron  •  potassium chloride **OR** potassium chloride **OR** potassium chloride  •  sodium chloride  •  sodium chloride      VITAL SIGNS  Vitals:    11/03/22 1600 11/03/22 2039 11/04/22 0504 11/04/22 0819   BP: 116/75 119/73 111/69 123/82   BP Location: Left arm Left arm Left arm Left arm   Patient Position: Lying Lying Lying Lying   Pulse: 105 104 98 96   Resp: 18 17 16 16   Temp: 98.4 °F (36.9 °C) 98.5 °F (36.9 °C) 98.2 °F (36.8 °C)    TempSrc: Oral Oral Oral Oral   SpO2: 92% 96% 96% 96%   Weight:       Height:           Flowsheet Rows    Flowsheet Row First Filed Value   Admission Height 162.6 cm (64\") Documented at 11/03/2022 0930   Admission Weight 68 kg (150 lb) Documented at 11/03/2022 0930           TELEMETRY: sinus    Physical Exam:  Vitals reviewed.   Constitutional:       General: Awake.      Appearance: Overweight. Chronically " ill-appearing.      Interventions: Nasal cannula in place.   Eyes:      Conjunctiva/sclera: Conjunctivae normal.      Pupils: Pupils are equal, round, and reactive to light.   Pulmonary:      Effort: Pulmonary effort is normal.      Breath sounds: Normal breath sounds.   Cardiovascular:      Normal rate. Regular rhythm. Normal S1. Normal S2.      Murmurs: There is no murmur.   Pulses:     Intact distal pulses.   Edema:     Feet: bilateral 3+ edema of the feet.  Abdominal:      General: Bowel sounds are normal. There is distension.      Tenderness: There is generalized abdominal tenderness.   Musculoskeletal: Normal range of motion.      Cervical back: Normal range of motion and neck supple. Skin:     General: Skin is warm and dry.   Neurological:      General: No focal deficit present.      Mental Status: Alert and oriented to person, place and time.   Psychiatric:         Behavior: Behavior is cooperative.            LAB RESULTS (LAST 7 DAYS)    CBC  Results from last 7 days   Lab Units 11/04/22  0305 11/03/22  0957 10/31/22  1323 10/31/22  1104   WBC 10*3/mm3 351.60* 376.70* 338.80* 347.52*   RBC 10*6/mm3 3.01* 3.30* 3.28* 3.19*   HEMOGLOBIN g/dL 7.2* 7.8* 7.9* 8.1*   HEMATOCRIT % 25.7* 27.3* 26.1* 26.1*   MCV fL 85.3 82.8 79.4 81.8   PLATELETS 10*3/mm3 410 404 322 334       BMP  Results from last 7 days   Lab Units 11/03/22  0957 10/31/22  1323 10/31/22  1104   SODIUM mmol/L 134* 138 138   POTASSIUM mmol/L 4.5 3.6 3.6   CHLORIDE mmol/L 96* 101 101   CO2 mmol/L 19.0* 25.0 24.0   BUN mg/dL 11 12 12   CREATININE mg/dL 0.96 0.77 0.76   GLUCOSE mg/dL 411* 166* 202*   MAGNESIUM mg/dL  --   --  1.9       CMP   Results from last 7 days   Lab Units 11/03/22  0957 10/31/22  1323 10/31/22  1104   SODIUM mmol/L 134* 138 138   POTASSIUM mmol/L 4.5 3.6 3.6   CHLORIDE mmol/L 96* 101 101   CO2 mmol/L 19.0* 25.0 24.0   BUN mg/dL 11 12 12   CREATININE mg/dL 0.96 0.77 0.76   GLUCOSE mg/dL 411* 166* 202*   ALBUMIN g/dL 3.40* 3.20*  3.00*   BILIRUBIN mg/dL 1.2 1.0 1.0   ALK PHOS U/L 1,826* 1,747* 1,510*   AST (SGOT) U/L 22 19 19   ALT (SGPT) U/L 11 10 9   LIPASE U/L  --  10*  --        ProBNP        TROPONIN  Results from last 7 days   Lab Units 11/03/22  1150   TROPONIN T ng/mL <0.010       Creatinine Clearance  Estimated Creatinine Clearance: 68.6 mL/min (by C-G formula based on SCr of 0.96 mg/dL).      Radiology  XR Chest 1 View    Result Date: 11/3/2022  IMPRESSION : Cardiomegaly and mild pulmonary vascular congestion which is increase in the comparison. This may be in part artifactual from low lung volumes versus underlying pulmonary edema or pneumonia in the correct clinical setting[  Electronically Signed By-Freddy Toribio On:11/3/2022 10:26 AM This report was finalized on [id number] by  Freddy Toribio, .      EKG      I personally viewed and interpreted the patient's EKG/Telemetry data:    ECHOCARDIOGRAM:  Results for orders placed during the hospital encounter of 06/29/22    Adult Transthoracic Echo Complete W/ Cont if Necessary Per Protocol    Interpretation Summary  · The left ventricular cavity is mildly dilated.  · Left ventricular wall thickness is consistent with mild concentric hypertrophy.  · Left ventricular ejection fraction appears to be 41 - 45%.  · Left ventricular diastolic function is consistent with (grade Ia w/high LAP) impaired relaxation.      STRESS MYOVIEW:    CARDIAC CATHETERIZATION:    OTHER:         Assessment & Plan       Chest pain, unspecified type    CML (chronic myelocytic leukemia) ([facility])    Depression with anxiety    History of pulmonary embolism    Medically noncompliant    Type 2 diabetes mellitus with diabetic polyneuropathy, with long-term current use of insulin ([facility])    Acute diastolic CHF (congestive heart failure) ([facility])    NICM (nonischemic cardiomyopathy) ([facility])      PLAN  Start lasix 20mg IV BID  Potassium replacement per protocol  Strict I&Os  Daily weights    I discussed the patients  findings and my recommendations with patient and nurse.  Further recommendations and assessment per Dr. Feldman.    JP Marquez  11/04/22  10:18 EDT   Electronically signed by JP Marquez, 11/04/22, 10:16 AM EDT.

## 2022-11-04 NOTE — THERAPY EVALUATION
Patient Name: Nieves Mc  : 1975    MRN: [id number]                              Today's Date: 2022       Admit Date: 11/3/2022    Visit Dx:     ICD-10-CM ICD-9-CM   1. Chest pain, unspecified type  R07.9 786.50   2. Acute pulmonary edema (HCC)  J81.0 518.4   3. Dyspnea, unspecified type  R06.00 786.09   4. 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)   • Acute diastolic CHF (congestive heart failure) (HCC)   • Menorrhagia   • Tumor lysis syndrome   • Acute respiratory failure with hypoxia (HCC)   • Thrombocytopenia (HCC)   • Hyperuricemia   • NICM (nonischemic cardiomyopathy) (HCC)   • Dyspnea, unspecified type   • 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 legs pain   • Leg pain     left leg greater   • Migraine    • Pulmonary embolism (HCC)    • Vision loss     doing surgery     Past Surgical History:   Procedure Laterality Date   • BONE MARROW BIOPSY     • BREAST SURGERY     • BRONCHOSCOPY N/A 2022    Procedure: BRONCHOSCOPY bilateral lung washing;  Surgeon: Charlene Camara MD;  Location: [facility]  ENDOSCOPY;  Service: Pulmonary;  Laterality: N/A;  post: rule out infection vs transfusion lung injury   •  SECTION     • CHOLECYSTECTOMY     • EYE SURGERY      laser surgery due  to hemmorage--- 2021-- another surgery  lt eye11/15/21   • RETINAL DETACHMENT SURGERY     • SPINE SURGERY      Lombardi spinal block   • TUBAL ABDOMINAL LIGATION        General Information     Row Name 22 09          General Information    Prior Level of Function independent:;ADL's;all household mobility  -     Existing Precautions/Restrictions oxygen therapy device and L/min  -     Barriers to Rehab medically complex;family issues  trying to reconcile with estranged spouse per pt report.  -     Row Name 22          Living Environment    People in Home parent(s);child(tobin), dependent  Pt reports her daughter is in school.  -     Row Name 22          Home Main Entrance    Number of Stairs, Main Entrance none  -     Row Name 22          Stairs Within Home, Primary    Number of Stairs, Within Home, Primary none  -     Row Name 22          Cognition    Orientation Status (Cognition) oriented x 4  -     Row Name 22          Safety Issues, Functional Mobility    Safety Issues Affecting Function (Mobility) insight into deficits/self-awareness  -     Impairments Affecting Function (Mobility) endurance/activity tolerance  -           User Key  (r) = Recorded By, (t) = Taken By, (c) = Cosigned By    Initials Name Provider Type     Yanique Rogel, OT Occupational Therapist                 Mobility/ADL's     Row Name 22          Bed Mobility    Bed Mobility bed mobility (all) activities  -     All Activities, [place] (Bed Mobility) independent  -     Row Name 22          Transfers    Transfers sit-stand transfer;stand-sit transfer  -     Row Name 22          Sit-Stand Transfer    Sit-Stand [place] (Transfers)  independent  -[facility] Name 11/04/22 0940          Stand-Sit Transfer    Stand-Sit [place] (Transfers) independent  -[facility] Name 11/04/22 0940          Functional Mobility    Functional Mobility- Ind. Level conditional independence  -     Functional Mobility- Comment Pt is up ad alessandro and is removing O2 for trips to the bathroom. Pt states she has been evaluated for home O2 before but had never qualified.  -[facility] Name 11/04/22 0940          Activities of Daily Living    BADL Assessment/Intervention lower body dressing;toileting;feeding  -[facility] Name 11/04/22 0940          Lower Body Dressing Assessment/Training    [place] Level (Lower Body Dressing) lower body dressing skills;independent  -[facility] Name 11/04/22 0940          Toileting Assessment/Training    [place] Level (Toileting) toileting skills;independent  -[facility] Name 11/04/22 0940          Self-Feeding Assessment/Training    [place] Level (Feeding) feeding skills;independent  -           User Key  (r) = Recorded By, (t) = Taken By, (c) = Cosigned By    Initials Name Provider Type     Yanique Rogel OT Occupational Therapist               Obj/Interventions     [facility] Name 11/04/22 0941          Sensory Assessment (Somatosensory)    Sensory Assessment (Somatosensory) sensation intact  -[facility] Name 11/04/22 0941          Range of Motion Comprehensive    General Range of Motion no range of motion deficits identified  -[facility] Name 11/04/22 0941          Strength Comprehensive (MMT)    Comment, General Manual Muscle Testing (MMT) Assessment global deconditioning and limited activity tolerance but no acute weakness.  -[facility] Name 11/04/22 0941          Balance    Balance Assessment sitting static balance;sitting dynamic balance;standing static balance;standing dynamic balance  -     Static Sitting Balance independent  -     Dynamic Sitting Balance independent  -     Static Standing Balance independent   -     Dynamic Standing Balance independent  -           User Key  (r) = Recorded By, (t) = Taken By, (c) = Cosigned By    Initials Name Provider Type     Yanique Rogel, OT Occupational Therapist               Goals/Plan    No documentation.                Clinical Impression     Row Name 11/04/22 0942          Pain Assessment    Pretreatment Pain Rating 0/10 - no pain  -     Posttreatment Pain Rating 0/10 - no pain  -     Row Name 11/04/22 0942          Plan of Care Review    Plan of Care Reviewed With patient  -     Progress no change  -     Outcome Evaluation Pt is 46 y/o F with acute on chroinc leukemia admitted with acute chest pain and Lt arm numbness which have resolved at this time. Pt has anemia, some fluid overload, and ABD anascara. She is being diuresed. She will have transfusion this date. She is on 2L O2 and removes the nasal cannula with resulting drop to 89%. Otherwise pt appears to be functioning at her baseline, is up ad alessandro, eating well, and has no concerns about d/c home with her mother & dtr. Recommend D/C home. Possible need for wlaking oximetry prior to d/c, but no further acute OT needs identified.  -     Row Name 11/04/22 0942          Therapy Assessment/Plan (OT)    Criteria for Skilled Therapeutic Interventions Met (OT) no  -     Therapy Frequency (OT) evaluation only  -     Row Name 11/04/22 0942          Therapy Plan Review/Discharge Plan (OT)    Anticipated Discharge Disposition (OT) home  -     Row Name 11/04/22 0942          Vital Signs    O2 Delivery Pre Treatment room air  -     Intra SpO2 (%) 89  -     O2 Delivery Intra Treatment room air  -     O2 Delivery Post Treatment supplemental O2  -     Pre Patient Position Sitting  -     Intra Patient Position Standing  -     Post Patient Position Sitting  -     Row Name 11/04/22 0942          Positioning and Restraints    Pre-Treatment Position in bed  -     Post Treatment Position bed  -MH     In Bed  sitting;call light within reach;encouraged to call for assist  tailor sit on the bed  -           User Key  (r) = Recorded By, (t) = Taken By, (c) = Cosigned By    Initials Name Provider Type     Yanique Rogel OT Occupational Therapist               Outcome Measures    No documentation.                 Occupational Therapy Education     Title: PT OT SLP Therapies (In Progress)     Topic: Occupational Therapy (In Progress)     Point: ADL training (Not Started)     Description:   Instruct learner(s) on proper safety adaptation and remediation techniques during self care or transfers.   Instruct in proper use of assistive devices.              Learner Progress:  Not documented in this visit.          Point: Home exercise program (Not Started)     Description:   Instruct learner(s) on appropriate technique for monitoring, assisting and/or progressing therapeutic exercises/activities.              Learner Progress:  Not documented in this visit.          Point: Precautions (Done)     Description:   Instruct learner(s) on prescribed precautions during self-care and functional transfers.              Learning Progress Summary           Patient Acceptance, E,TB, VU by  at 11/4/2022 0949                   Point: Body mechanics (Not Started)     Description:   Instruct learner(s) on proper positioning and spine alignment during self-care, functional mobility activities and/or exercises.              Learner Progress:  Not documented in this visit.                      User Key     Initials Effective Dates Name Provider Type Discipline     06/16/21 -  Yanique Rogel OT Occupational Therapist OT              OT Recommendation and Plan  Therapy Frequency (OT): evaluation only  Plan of Care Review  Plan of Care Reviewed With: patient  Progress: no change  Outcome Evaluation: Pt is 48 y/o F with acute on chroinc leukemia admitted with acute chest pain and Lt arm numbness which have resolved at this time. Pt has anemia,  some fluid overload, and ABD anascara. She is being diuresed. She will have transfusion this date. She is on 2L O2 and removes the nasal cannula with resulting drop to 89%. Otherwise pt appears to be functioning at her baseline, is up ad alessandro, eating well, and has no concerns about d/c home with her mother & dtr. Recommend D/C home. Possible need for wlaking oximetry prior to d/c, but no further acute OT needs identified.     Time Calculation:    Time Calculation- OT     Row Name 11/04/22 0949             Time Calculation-     OT Start Time 0850  -      OT Stop Time 0900  -      OT Time Calculation (min) 10 min  -      Total Timed Code Minutes- OT 0 minute(s)  -      OT Received On 11/04/22  -            User Key  (r) = Recorded By, (t) = Taken By, (c) = Cosigned By    Initials Name Provider Type     Yanique Rogel OT Occupational Therapist              Therapy Charges for Today     Code Description Service Date Service Provider Modifiers Qty    [id number]  OT EVAL LOW COMPLEXITY 3 11/4/2022 Yanique Rogel OT GO 1               Yanique Rogel OT  11/4/2022

## 2022-11-04 NOTE — PLAN OF CARE
Goal Outcome Evaluation:   Pt a/o. 2L NC. Family at bedside. Iv Lasix. PRN norco for pain.Will transfuse 1 unit when is available. Call light in reach.

## 2022-11-04 NOTE — CONSULTS
"Heart Failure Program  Nurse Navigator  Discharge Planning    Patient Name:Nieves Mc  :1975  Cardiologist:Lucrecia  Current Admission Date: 11/3/2022   Previous Admission: 22  Admission frequency: 4 admissions in 6 months    Heart Failure history per record:    Symptoms on admission:c/o SOA, swelling ankles, orthopnea, fatigue pt reports began worsen in past week. Pt advises she has had more LOYD. Pt is not on home diuretics, does not have a cardiologist.       Admission Weight:  Flowsheet Rows    Flowsheet Row First Filed Value   Admission Height 162.6 cm (64\") Documented at 2022 0930   Admission Weight 68 kg (150 lb) Documented at 2022 0930            Current Home Medications:  Prior to Admission medications    Medication Sig Start Date End Date Taking? Authorizing Provider   allopurinol (ZYLOPRIM) 100 MG tablet Take 1 tablet by mouth Daily. 7/10/22  Yes Anthony Herndon, DO   ALPRAZolam (Xanax) 0.25 MG tablet Take 1 tablet by mouth At Night As Needed for Anxiety. 10/19/22  Yes Denisha Gill APRN   budesonide-formoterol (SYMBICORT) 160-4.5 MCG/ACT inhaler Inhale 2 puffs 2 (Two) Times a Day. 22  Yes Villa Patel MD   citalopram (CeleXA) 10 MG tablet Take 1 tablet by mouth Daily. To begin 22  Yes Denisha Gill APRN   fentaNYL (DURAGESIC) 25 MCG/HR patch Place 1 patch on the skin as directed by provider Every 72 (Seventy-Two) Hours. 10/19/22  Yes Kishore Chatterjee MD   folic acid (FOLVITE) 1 MG tablet Take 1 tablet by mouth Daily. 22  Yes Anthony Herndon DO   guaiFENesin (MUCINEX) 600 MG 12 hr tablet Take 1 tablet by mouth Every 12 (Twelve) Hours. 22  Yes Anthony Herndon, DO   hydroxyurea (HYDREA) 500 MG capsule Take 2 capsules by mouth 2 (Two) Times a Day. 22  Yes Meghann Lerma MD   hydroxyurea (HYDREA) 500 MG capsule Take 1 capsule by mouth Daily. Afternoon   Yes Provider, Historical, MD   hydrOXYzine " (ATARAX) 25 MG tablet Take 1 tablet by mouth Every 6 (Six) Hours As Needed for Itching. 10/12/22  Yes Denisha Gill APRN   imatinib (GLEEVEC) 400 MG chemo tablet Take 1 tablet by mouth Daily for 30 days. Take with food and glass of water. Do not take with Grapefruit juice. 8/18/22 11/18/22 Yes Meghann Lerma MD   Insulin Glargine (BASAGLAR KWIKPEN) 100 UNIT/ML injection pen Inject 20 Units under the skin into the appropriate area as directed Daily. 7/10/22  Yes Anthony Herndon DO   Insulin Lispro (ADMELOG SOLOSTAR) 100 UNIT/ML injection pen Inject 6 Units under the skin into the appropriate area as directed 3 (Three) Times a Day. 7/9/22  Yes Anthony Herndon DO   losartan (COZAAR) 25 MG tablet Take 1 tablet by mouth Daily. 7/9/22  Yes Anthony Herndon DO   metoprolol succinate XL (TOPROL-XL) 25 MG 24 hr tablet Take 25 mg by mouth Daily. 3/1/22  Yes Melecio Almanzar MD   mirtazapine (REMERON) 15 MG tablet Take 1 tablet by mouth Every Night for 30 days. Start Mirtazapine and decrease Trazodone to 1/2 tablet x 14 days and then stop Trazodone. 10/12/22 11/11/22 Yes Denisha Gill APRN   ondansetron ODT (ZOFRAN-ODT) 8 MG disintegrating tablet Place 1 tablet on the tongue Every 8 (Eight) Hours As Needed for Nausea or Vomiting. 10/31/22  Yes Meghann Lerma MD   imatinib (GLEEVEC) 100 MG chemo tablet Take 2 tablets by mouth with 1 other imatinib prescription for 600 mg total Daily. Take with food and large glass of water; Do not take with Grapefruit juice. 11/2/22   Meghann Lerma MD   imatinib (GLEEVEC) 400 MG chemo tablet Take 1 tablet by mouth with 1 other imatinib prescription for 600 mg total Daily. Take with food and large glass of water; Do not take with Grapefruit juice. 11/2/22   Meghann Lerma MD       Social history:   Pt form home with parents and adult children. Pt advises she is not on a fluid restriction  Any special  "diet.    Smoking status:     Diagnostics Testing:  proBNP level: 65297    Echocardiogram:Results for orders placed during the hospital encounter of 06/29/22    Adult Transthoracic Echo Complete W/ Cont if Necessary Per Protocol    Interpretation Summary  · The left ventricular cavity is mildly dilated.  · Left ventricular wall thickness is consistent with mild concentric hypertrophy.  · Left ventricular ejection fraction appears to be 41 - 45%.  · Left ventricular diastolic function is consistent with (grade Ia w/high LAP) impaired relaxation.        Patient Assessment:   Pt sitting up in chair, resp even and unlabored, no SOA with conversation. Noted edema lower legs 2+ bilaterally ankle to below knee. Pt reports her legs are \"always like this\"    Current O2: none  Home O2:      Education provided to patient:  yes- Heart Failure disease education  yes -Symptom identification/management  yes -Daily Weights  yes- Diet education  na- Fluid restriction (if ordered)  yes- Activity education  yes- Medication education  na- Smoking cessation  yes- Follow-up Appointments  yes-Provided information on how to access [facility] My HF Guide/Heart Failure Interactive workbook    Acceptance of learning: acceptance, cooperative, teachback    Heart Failure education interactive teaching session time: 30 minutes    GWTG: EF 41-45%    Identified needs/barriers:   Volume status, pending cardiology consult, I&O, daily weights. Needs established cardiology follow-up.     Intervention:   RN discussion, education          "

## 2022-11-04 NOTE — PLAN OF CARE
Goal Outcome Evaluation:  Plan of Care Reviewed With: patient        Progress: no change   Pt c/o abdominal distention and tenderness. Pt resting comfortably with no other complaints. Currently on 2L O2. Diuresing. Blood sugars elevated. Will continue to monitor...

## 2022-11-04 NOTE — NURSING NOTE
Noted pt has three different antibodies. Blood bank has put in request for a unit of blood which will not be available until 1900 this evening. MD notified.

## 2022-11-05 LAB
ANION GAP SERPL CALCULATED.3IONS-SCNC: 12 MMOL/L (ref 5–15)
ANISOCYTOSIS BLD QL: ABNORMAL
BACTERIA SPEC AEROBE CULT: NORMAL
BACTERIA SPEC AEROBE CULT: NORMAL
BASOPHILS # BLD MANUAL: 28.49 10*3/MM3 (ref 0–0.2)
BASOPHILS NFR BLD MANUAL: 10 % (ref 0–1.5)
BLASTS NFR BLD MANUAL: 47 % (ref 0–0)
BUN SERPL-MCNC: 26 MG/DL (ref 6–20)
BUN/CREAT SERPL: 25.5 (ref 7–25)
CALCIUM SPEC-SCNC: 8.3 MG/DL (ref 8.6–10.5)
CHLORIDE SERPL-SCNC: 96 MMOL/L (ref 98–107)
CO2 SERPL-SCNC: 27 MMOL/L (ref 22–29)
CREAT SERPL-MCNC: 1.02 MG/DL (ref 0.57–1)
DEPRECATED RDW RBC AUTO: 49.9 FL (ref 37–54)
EGFRCR SERPLBLD CKD-EPI 2021: 68.4 ML/MIN/1.73
EOSINOPHIL # BLD MANUAL: 5.7 10*3/MM3 (ref 0–0.4)
EOSINOPHIL NFR BLD MANUAL: 2 % (ref 0.3–6.2)
ERYTHROCYTE [DISTWIDTH] IN BLOOD BY AUTOMATED COUNT: 18.4 % (ref 12.3–15.4)
GLUCOSE BLDC GLUCOMTR-MCNC: 127 MG/DL (ref 70–105)
GLUCOSE BLDC GLUCOMTR-MCNC: 131 MG/DL (ref 70–105)
GLUCOSE BLDC GLUCOMTR-MCNC: 229 MG/DL (ref 70–105)
GLUCOSE SERPL-MCNC: 190 MG/DL (ref 65–99)
HCT VFR BLD AUTO: 27.3 % (ref 34–46.6)
HGB BLD-MCNC: 8.4 G/DL (ref 12–15.9)
LDH SERPL-CCNC: >2500 U/L (ref 135–214)
LYMPHOCYTES # BLD MANUAL: 2.85 10*3/MM3 (ref 0.7–3.1)
LYMPHOCYTES NFR BLD MANUAL: 1 % (ref 5–12)
MCH RBC QN AUTO: 24.3 PG (ref 26.6–33)
MCHC RBC AUTO-ENTMCNC: 30.8 G/DL (ref 31.5–35.7)
MCV RBC AUTO: 78.7 FL (ref 79–97)
METAMYELOCYTES NFR BLD MANUAL: 3 % (ref 0–0)
MONOCYTES # BLD: 2.85 10*3/MM3 (ref 0.1–0.9)
MYELOCYTES NFR BLD MANUAL: 5 % (ref 0–0)
NEUTROPHILS # BLD AUTO: 88.32 10*3/MM3 (ref 1.7–7)
NEUTROPHILS NFR BLD MANUAL: 19 % (ref 42.7–76)
NEUTS BAND NFR BLD MANUAL: 12 % (ref 0–5)
PLAT MORPH BLD: NORMAL
PLATELET # BLD AUTO: 439 10*3/MM3 (ref 140–450)
PMV BLD AUTO: 8.5 FL (ref 6–12)
POLYCHROMASIA BLD QL SMEAR: ABNORMAL
POTASSIUM SERPL-SCNC: 4.5 MMOL/L (ref 3.5–5.2)
PROCALCITONIN SERPL-MCNC: 0.38 NG/ML (ref 0–0.25)
RBC # BLD AUTO: 3.48 10*6/MM3 (ref 3.77–5.28)
SCAN SLIDE: NORMAL
SODIUM SERPL-SCNC: 135 MMOL/L (ref 136–145)
URATE SERPL-MCNC: 15 MG/DL (ref 2.4–5.7)
VARIANT LYMPHS NFR BLD MANUAL: 1 % (ref 19.6–45.3)
WBC MORPH BLD: NORMAL
WBC NRBC COR # BLD: 284.9 10*3/MM3 (ref 3.4–10.8)

## 2022-11-05 PROCEDURE — 99232 SBSQ HOSP IP/OBS MODERATE 35: CPT | Performed by: NURSE PRACTITIONER

## 2022-11-05 PROCEDURE — 63710000001 INSULIN LISPRO (HUMAN) PER 5 UNITS: Performed by: NURSE PRACTITIONER

## 2022-11-05 PROCEDURE — 94799 UNLISTED PULMONARY SVC/PX: CPT

## 2022-11-05 PROCEDURE — 84550 ASSAY OF BLOOD/URIC ACID: CPT | Performed by: NURSE PRACTITIONER

## 2022-11-05 PROCEDURE — 82962 GLUCOSE BLOOD TEST: CPT

## 2022-11-05 PROCEDURE — 87040 BLOOD CULTURE FOR BACTERIA: CPT | Performed by: INTERNAL MEDICINE

## 2022-11-05 PROCEDURE — 25010000002 ENOXAPARIN PER 10 MG: Performed by: NURSE PRACTITIONER

## 2022-11-05 PROCEDURE — 25010000002 FUROSEMIDE PER 20 MG: Performed by: NURSE PRACTITIONER

## 2022-11-05 PROCEDURE — 94664 DEMO&/EVAL PT USE INHALER: CPT

## 2022-11-05 PROCEDURE — 84145 PROCALCITONIN (PCT): CPT | Performed by: INTERNAL MEDICINE

## 2022-11-05 PROCEDURE — 94760 N-INVAS EAR/PLS OXIMETRY 1: CPT

## 2022-11-05 PROCEDURE — 99233 SBSQ HOSP IP/OBS HIGH 50: CPT | Performed by: INTERNAL MEDICINE

## 2022-11-05 PROCEDURE — 99222 1ST HOSP IP/OBS MODERATE 55: CPT | Performed by: PSYCHIATRY & NEUROLOGY

## 2022-11-05 PROCEDURE — 85007 BL SMEAR W/DIFF WBC COUNT: CPT | Performed by: NURSE PRACTITIONER

## 2022-11-05 PROCEDURE — 80048 BASIC METABOLIC PNL TOTAL CA: CPT | Performed by: INTERNAL MEDICINE

## 2022-11-05 PROCEDURE — 83615 LACTATE (LD) (LDH) ENZYME: CPT | Performed by: INTERNAL MEDICINE

## 2022-11-05 PROCEDURE — 85025 COMPLETE CBC W/AUTO DIFF WBC: CPT | Performed by: NURSE PRACTITIONER

## 2022-11-05 PROCEDURE — 63710000001 INSULIN GLARGINE PER 5 UNITS: Performed by: NURSE PRACTITIONER

## 2022-11-05 RX ORDER — MIRTAZAPINE 15 MG/1
30 TABLET, FILM COATED ORAL NIGHTLY
Status: DISCONTINUED | OUTPATIENT
Start: 2022-11-05 | End: 2022-11-16

## 2022-11-05 RX ORDER — CARVEDILOL 6.25 MG/1
6.25 TABLET ORAL 2 TIMES DAILY WITH MEALS
Status: DISCONTINUED | OUTPATIENT
Start: 2022-11-05 | End: 2022-11-17 | Stop reason: HOSPADM

## 2022-11-05 RX ORDER — ALPRAZOLAM 0.5 MG/1
0.5 TABLET ORAL NIGHTLY PRN
Status: DISCONTINUED | OUTPATIENT
Start: 2022-11-05 | End: 2022-11-17 | Stop reason: HOSPADM

## 2022-11-05 RX ADMIN — INSULIN LISPRO 10 UNITS: 100 INJECTION, SOLUTION INTRAVENOUS; SUBCUTANEOUS at 11:56

## 2022-11-05 RX ADMIN — ALPRAZOLAM 0.5 MG: 0.5 TABLET ORAL at 20:51

## 2022-11-05 RX ADMIN — BUDESONIDE AND FORMOTEROL FUMARATE DIHYDRATE 2 PUFF: 160; 4.5 AEROSOL RESPIRATORY (INHALATION) at 08:25

## 2022-11-05 RX ADMIN — INSULIN LISPRO 4 UNITS: 100 INJECTION, SOLUTION INTRAVENOUS; SUBCUTANEOUS at 18:12

## 2022-11-05 RX ADMIN — INSULIN GLARGINE 30 UNITS: 100 INJECTION, SOLUTION SUBCUTANEOUS at 07:40

## 2022-11-05 RX ADMIN — HYDROXYUREA 1000 MG: 500 CAPSULE ORAL at 07:56

## 2022-11-05 RX ADMIN — CARVEDILOL 6.25 MG: 6.25 TABLET, FILM COATED ORAL at 18:12

## 2022-11-05 RX ADMIN — HYDROCODONE BITARTRATE AND ACETAMINOPHEN 1 TABLET: 7.5; 325 TABLET ORAL at 20:50

## 2022-11-05 RX ADMIN — Medication 10 ML: at 07:37

## 2022-11-05 RX ADMIN — HYDROXYUREA 500 MG: 500 CAPSULE ORAL at 18:12

## 2022-11-05 RX ADMIN — INSULIN LISPRO 10 UNITS: 100 INJECTION, SOLUTION INTRAVENOUS; SUBCUTANEOUS at 18:12

## 2022-11-05 RX ADMIN — HYDROCODONE BITARTRATE AND ACETAMINOPHEN 1 TABLET: 7.5; 325 TABLET ORAL at 14:23

## 2022-11-05 RX ADMIN — FUROSEMIDE 20 MG: 10 INJECTION, SOLUTION INTRAMUSCULAR; INTRAVENOUS at 07:39

## 2022-11-05 RX ADMIN — FOLIC ACID 1 MG: 1 TABLET ORAL at 07:36

## 2022-11-05 RX ADMIN — ASPIRIN 325 MG: 325 TABLET, COATED ORAL at 07:36

## 2022-11-05 RX ADMIN — ALLOPURINOL 100 MG: 100 TABLET ORAL at 07:51

## 2022-11-05 RX ADMIN — FUROSEMIDE 20 MG: 10 INJECTION, SOLUTION INTRAMUSCULAR; INTRAVENOUS at 18:12

## 2022-11-05 RX ADMIN — Medication 10 ML: at 20:51

## 2022-11-05 RX ADMIN — MIRTAZAPINE 30 MG: 15 TABLET, FILM COATED ORAL at 20:50

## 2022-11-05 RX ADMIN — HYDROCODONE BITARTRATE AND ACETAMINOPHEN 1 TABLET: 7.5; 325 TABLET ORAL at 07:36

## 2022-11-05 RX ADMIN — INSULIN LISPRO 10 UNITS: 100 INJECTION, SOLUTION INTRAVENOUS; SUBCUTANEOUS at 07:42

## 2022-11-05 RX ADMIN — GUAIFENESIN 600 MG: 600 TABLET, EXTENDED RELEASE ORAL at 20:50

## 2022-11-05 RX ADMIN — CITALOPRAM HYDROBROMIDE 10 MG: 20 TABLET ORAL at 07:36

## 2022-11-05 RX ADMIN — ENOXAPARIN SODIUM 40 MG: 100 INJECTION SUBCUTANEOUS at 18:11

## 2022-11-05 RX ADMIN — HYDROXYUREA 1000 MG: 500 CAPSULE ORAL at 20:50

## 2022-11-05 RX ADMIN — GUAIFENESIN 600 MG: 600 TABLET, EXTENDED RELEASE ORAL at 07:36

## 2022-11-05 RX ADMIN — IMATINIB 400 MG: 400 TABLET ORAL at 08:17

## 2022-11-05 NOTE — PROGRESS NOTES
Referring Provider: Hospitalist    Reason for follow-up: Chest pain     Patient Care Team:  Alida Latham APRN as PCP - General (Nurse Practitioner)  Meghann Lerma MD as Consulting Physician (Hematology and Oncology)    Subjective .  Patient does not have any chest pain today    Objective  Lying in bed comfortably     Review of Systems   Constitutional: Negative for malaise/fatigue.   Cardiovascular: Negative for chest pain, dyspnea on exertion, leg swelling and palpitations.   Respiratory: Negative for cough and shortness of breath.    Gastrointestinal: Negative for abdominal pain, nausea and vomiting.   Neurological: Negative for dizziness, focal weakness, headaches, light-headedness and numbness.   All other systems reviewed and are negative.      Hydromorphone, Morphine, and Propofol    Scheduled Meds:allopurinol, 100 mg, Oral, Daily  aspirin, 325 mg, Oral, Daily  budesonide-formoterol, 2 puff, Inhalation, BID - RT  citalopram, 10 mg, Oral, Daily  enoxaparin, 40 mg, Subcutaneous, Q24H  fentaNYL, 1 patch, Transdermal, Q72H  folic acid, 1 mg, Oral, Daily  furosemide, 20 mg, Intravenous, BID  guaiFENesin, 600 mg, Oral, Q12H  hydroxyurea, 1,000 mg, Oral, BID  hydroxyurea, 500 mg, Oral, Daily  imatinib, 400 mg, Oral, Daily  insulin glargine, 30 Units, Subcutaneous, Daily  insulin lispro, 10 Units, Subcutaneous, TID AC  insulin lispro, 2-9 Units, Subcutaneous, TID With Meals  mirtazapine, 30 mg, Oral, Nightly  sodium chloride, 250 mL, Intravenous, Once  sodium chloride, 10 mL, Intravenous, Q12H      Continuous Infusions:   PRN Meds:.•  acetaminophen **OR** acetaminophen **OR** acetaminophen  •  ALPRAZolam  •  aluminum-magnesium hydroxide-simethicone  •  dextrose  •  dextrose  •  glucagon (human recombinant)  •  HYDROcodone-acetaminophen  •  influenza vaccine  •  melatonin  •  nitroglycerin  •  ondansetron **OR** ondansetron  •  potassium chloride **OR** potassium chloride **OR** potassium  "chloride  •  sodium chloride  •  sodium chloride        VITAL SIGNS  Vitals:    11/05/22 0455 11/05/22 0735 11/05/22 0825 11/05/22 0828   BP: 126/78 133/84     BP Location: Left arm Left arm     Patient Position: Lying Sitting     Pulse: 97 99 114 114   Resp: 16 18 18 18   Temp: 98.1 °F (36.7 °C) 98.2 °F (36.8 °C)     TempSrc: Oral Oral     SpO2: 94% 96% 96% 96%   Weight:       Height:           Flowsheet Rows    Flowsheet Row First Filed Value   Admission Height 162.6 cm (64\") Documented at 11/03/2022 0930   Admission Weight 68 kg (150 lb) Documented at 11/03/2022 0930           TELEMETRY: Sinus rhythm    Physical Exam:  Constitutional:       Appearance: Well-developed.   Eyes:      General: No scleral icterus.     Conjunctiva/sclera: Conjunctivae normal.   HENT:      Head: Normocephalic and atraumatic.   Neck:      Vascular: No carotid bruit or JVD.   Pulmonary:      Effort: Pulmonary effort is normal.      Breath sounds: Normal breath sounds. No wheezing. No rales.   Cardiovascular:      Normal rate. Regular rhythm.   Pulses:     Intact distal pulses.   Abdominal:      General: Bowel sounds are normal.      Palpations: Abdomen is soft.   Musculoskeletal:      Cervical back: Normal range of motion and neck supple. Skin:     General: Skin is warm and dry.      Findings: No rash.   Neurological:      Mental Status: Alert.          Results Review:   I reviewed the patient's new clinical results.  Lab Results (last 24 hours)     Procedure Component Value Units Date/Time    POC Glucose Once [[id number]]  (Abnormal) Collected: 11/05/22 1125    Specimen: Blood Updated: 11/05/22 1126     Glucose 127 mg/dL      Comment: Serial Number: [id number]:  [id number]       Lactate Dehydrogenase [[id number]]  (Abnormal) Collected: 11/05/22 0901    Specimen: Blood Updated: 11/05/22 0952     LDH >2,500 U/L     Procalcitonin [[id number]]  (Abnormal) Collected: 11/05/22 0901    Specimen: Blood Updated: 11/05/22 0946     Procalcitonin " "0.38 ng/mL     Narrative:      As a Marker for Sepsis (Non-Neonates):    1. <0.5 ng/mL represents a low risk of severe sepsis and/or septic shock.  2. >2 ng/mL represents a high risk of severe sepsis and/or septic shock.    As a Marker for Lower Respiratory Tract Infections that require antibiotic therapy:    PCT on Admission    Antibiotic Therapy       6-12 Hrs later    >0.5                Strongly Recommended  >0.25 - <0.5        Recommended   0.1 - 0.25          Discouraged              Remeasure/reassess PCT  <0.1                Strongly Discouraged     Remeasure/reassess PCT    As 28 day mortality risk marker: \"Change in Procalcitonin Result\" (>80% or <=80%) if Day 0 (or Day 1) and Day 4 values are available. Refer to http://www.[facility]com    Change in PCT <=80%  A decrease of PCT levels below or equal to 80% defines a positive change in PCT test result representing a higher risk for 28-day all-cause mortality of patients diagnosed with severe sepsis for septic shock.    Change in PCT >80%  A decrease of PCT levels of more than 80% defines a negative change in PCT result representing a lower risk for 28-day all-cause mortality of patients diagnosed with severe sepsis or septic shock.       Uric Acid [[id number]]  (Abnormal) Collected: 11/05/22 0901    Specimen: Blood Updated: 11/05/22 0939     Uric Acid 15.0 mg/dL     Blood Culture - Blood, Arm, Left [[id number]] Collected: 11/05/22 0901    Specimen: Blood from Arm, Left Updated: 11/05/22 0909    POC Glucose Once [[id number]]  (Abnormal) Collected: 11/05/22 0706    Specimen: Blood Updated: 11/05/22 0709     Glucose 131 mg/dL      Comment: Serial Number: [id number]:  [id number]       Basic Metabolic Panel [[id number]]  (Abnormal) Collected: 11/05/22 0149    Specimen: Blood Updated: 11/05/22 0325     Glucose 190 mg/dL      BUN 26 mg/dL      Creatinine 1.02 mg/dL      Sodium 135 mmol/L      Potassium 4.5 mmol/L      Chloride 96 mmol/L      CO2 " 27.0 mmol/L      Calcium 8.3 mg/dL      BUN/Creatinine Ratio 25.5     Anion Gap 12.0 mmol/L      eGFR 68.4 mL/min/1.73      Comment: National Kidney Foundation and American Society of Nephrology (ASN) Task Force recommended calculation based on the Chronic Kidney Disease Epidemiology Collaboration (CKD-EPI) equation refit without adjustment for race.       Narrative:      GFR Normal >60  Chronic Kidney Disease <60  Kidney Failure <15      Manual Differential [[id number]]  (Abnormal) Collected: 11/05/22 0149    Specimen: Blood Updated: 11/05/22 0323     Neutrophil % 19.0 %      Lymphocyte % 1.0 %      Monocyte % 1.0 %      Eosinophil % 2.0 %      Basophil % 10.0 %      Bands %  12.0 %      Metamyelocyte % 3.0 %      Myelocyte % 5.0 %      Blasts % 47.0 %      Neutrophils Absolute 88.32 10*3/mm3      Lymphocytes Absolute 2.85 10*3/mm3      Monocytes Absolute 2.85 10*3/mm3      Eosinophils Absolute 5.70 10*3/mm3      Basophils Absolute 28.49 10*3/mm3      Anisocytosis Slight/1+     Polychromasia Slight/1+     WBC Morphology Normal     Platelet Morphology Normal    Narrative:      Reviewed by Pathologist within the past 30 days on 10.31.22 .     CBC & Differential [[id number]]  (Abnormal) Collected: 11/05/22 0149    Specimen: Blood Updated: 11/05/22 0323    Narrative:      The following orders were created for panel order CBC & Differential.  Procedure                               Abnormality         Status                     ---------                               -----------         ------                     CBC Auto Differential[[id number]]        Abnormal            Final result               Scan Slide[[id number]]                                       Final result                 Please view results for these tests on the individual orders.    Scan Slide [[id number]] Collected: 11/05/22 0149    Specimen: Blood Updated: 11/05/22 0323     Scan Slide --     Comment: See Manual Differential Results       CBC Auto  Differential [[id number]]  (Abnormal) Collected: 11/05/22 0149    Specimen: Blood Updated: 11/05/22 0323     .90 10*3/mm3      RBC 3.48 10*6/mm3      Hemoglobin 8.4 g/dL      Hematocrit 27.3 %      MCV 78.7 fL      Comment: Result checked          MCH 24.3 pg      MCHC 30.8 g/dL      RDW 18.4 %      RDW-SD 49.9 fl      MPV 8.5 fL      Platelets 439 10*3/mm3     Narrative:      Modified report. Previous result was Hemogram on 11/5/2022 at 0252 EDT.  The previously reported component NRBC is no longer being reported. Previous result was 0.2 /100 WBC (Reference Range: 0.0-0.2 /100 WBC) on 11/5/2022 at 0252 EDT.    Flow Cytometry [[id number]] Collected: 11/03/22 1643    Specimen: Blood Updated: 11/04/22 2019     Consult Global Result Comment^Text^TXT     Comment: Peripheral Blood:  1) Relatively increased neutrophilic cells with all stages   of maturation present.  2) Increased atypical myeloid blasts (13% of leukocytes).  3) Aberrant CD56 expression on granulocytes and monocytes.  4) Increased basophils (7% of leukocytes).  DISCLAIMER: REFER TO HARDCOPY OR PDF FOR COMPLETE RESULT.   If synopsis provided, clinical decisions should not be   based on this interfaced synopsis alone.  Performed at:  1 - [facility]  [address] [place] [address]  :  Molly Vega M.D., Ph.D., Phone:  [phone]       Narrative:      Performed at:  1 - [facility]  [address], [place] [address]  :  Molly Vega M.D., Ph.D., Phone:  [phone]    POC Glucose Once [[id number]]  (Abnormal) Collected: 11/04/22 1602    Specimen: Blood Updated: 11/04/22 1603     Glucose 136 mg/dL      Comment: Serial Number: [id number]:  [id number]             Imaging Results (Last 24 Hours)     ** No results found for the last 24 hours. **          EKG      I personally viewed and interpreted the patient's EKG/Telemetry  data:    ECHOCARDIOGRAM:    STRESS MYOVIEW:    CARDIAC CATHETERIZATION:    OTHER:         Assessment & Plan     Principal Problem:    Chest pain, unspecified type  Active Problems:    CML (chronic myelocytic leukemia) ([facility])    Depression with anxiety    History of pulmonary embolism    Medically noncompliant    Type 2 diabetes mellitus with diabetic polyneuropathy, with long-term current use of insulin ([facility])    Acute diastolic CHF (congestive heart failure) ([facility])    NICM (nonischemic cardiomyopathy) ([facility])       Patient presents with chest pain and is ruled out for MI by get enzymes  Patient has mild congestive heart failure in the past and will be started on low-dose beta-blockers  Patient is having a stress mostly rule out ischemia  Patient sugar levels and lipid levels are followed by primary doctor  Patient has history of pulmonary embolism in the past and is followed by the primary care doctor.  I discussed the patients findings and my recommendations with patient and nurse    Jose Sharma MD  11/05/22  13:30 EDT

## 2022-11-05 NOTE — PROGRESS NOTES
Hematology/Oncology Inpatient Progress Note    PATIENT NAME: Nieves Mc  : 1975  MRN: [id number]    CHIEF COMPLAINT: Chest pain    HISTORY OF PRESENT ILLNESS:    Nieves Mc is a 47 y.o. female who presented to [facility] on 11/3/2022 with complaints of chest pain.  Past medical history significant for CML, depression with anxiety, PE, type 2 diabetes mellitus.  Patient reported that around 9 AM the day of presentation she began to have chest pain.  Stated it was accompanied by the loss of hearing in her left ear and left arm numbness.  She reported she has never experienced this previous.  Since being in the emergency department the chest pain has subsided.  She describes the chest pain as dull.  She did not take anything at home to try to alleviate it.  She reported chronic leg swelling but denied any recent weight gain.  She reported shortness of breath.  She reported chronic cough.  She denied any fever or chills.  She also complained of nausea, vomiting and diarrhea. She was most recently seen in the emergency department on 10/31/2022 for abdominal pain at the instruction of her oncologist.  A CT of the abdomen and pelvis was fairly unchanged from prior and showed severe generalized anasarca and severe hepatosplenomegaly.  A CT of the chest was performed to rule out pulmonary embolism which was also negative and therefore the patient was discharged home.     Evaluation in the ED showed a negative troponin less than 0.010, elevated proBNP at 29,519, glucose 411, normal kidney function, elevated alk phos 1826.  WBC elevated at 376.70, hemoglobin 7.8, MCV 82.8, platelets 404,000, ANC 48.97, blasts 66%.  EKG showed sinus tachycardia, chest x-ray showed cardiomegaly and mild pulmonary vascular congestion which is increased in comparison.  The patient received Lasix, regular insulin, Nitrostat and a normal saline 250 mL bolus in the ED.  She was admitted to the hospital for further  evaluation and treatment.     11/03/22  Hematology/Oncology was consulted on a patient known to us and followed in the office by Dr. Lerma for her diagnosis of CML currently on imatinib and hydroxyurea.  Patient was initially seen in consultation in October 2018 while she was inpatient at [facility] with CML.  At that time she was under the care of Dr. Roach and  and was managed on imatinib.  Patient had a repeat bone marrow aspiration and biopsy in December 2018 that was consistent with chronic myeloid leukemia.  BCR ABL positive, chronic phase.  ABL kinase mutational analysis negative.  Patient was initiated on Tasigna in February 2019 but this was discontinued in June 2022 due to diagnosis of cardiomyopathy.  At that time it was determined that the imatinib would be the safer treatment option and she was reinitiated on this.  Her course has been complicated due to noncompliance and difficulty tolerating TKI's.    Flow Cytometry 11/3/22 - increased neutrophilic cells, increased atypical myeloid blasts (13% of leukocytes), aberrant CD56 expression on granulocytes and monocytes, increased basophils (7% of leukocytes)     11/4/22: Imatinib 400 mg daily restarted     11/5/22: Continues Hydrea 2500 mg daily, Imatinib 400 mg daily, allopurinol 100 mg daily. Patient denies chest pain or SOB. Discussed with patient that WBC is 284.9 today. Patient relays that she started her home supply of imatinib yesterday at 400 mg.      She  has a past medical history of Bone pain, COVID-19 virus infection (01/12/2022), Diabetes mellitus (HCC), Extremity pain, Leg pain, Migraine, Pulmonary embolism (HCC), and Vision loss.     PCP: Alida Latham APRN    History of present illness was reviewed and is unchanged from the previous visit. 11/05/22      Subjective      No changes overnight    ROS:    Review of Systems   Constitutional: Positive for fatigue. Negative for chills and fever.   HENT: Negative for congestion, drooling, ear  discharge, rhinorrhea, sinus pressure and tinnitus.    Eyes: Negative for photophobia, pain and discharge.   Respiratory: Positive for cough and shortness of breath. Negative for apnea, choking and stridor.    Cardiovascular: Positive for leg swelling. Negative for palpitations.   Gastrointestinal: Negative for abdominal distention, abdominal pain and anal bleeding.   Endocrine: Negative for polydipsia and polyphagia.   Genitourinary: Negative for decreased urine volume, flank pain and genital sores.   Musculoskeletal: Negative for gait problem, neck pain and neck stiffness.   Skin: Negative for color change, rash and wound.   Neurological: Positive for weakness. Negative for tremors, seizures, syncope, facial asymmetry and speech difficulty.   Hematological: Negative for adenopathy.   Psychiatric/Behavioral: Negative for agitation, confusion, hallucinations and self-injury. The patient is not hyperactive.         MEDICATIONS:    Scheduled Meds:  allopurinol, 100 mg, Oral, Daily  aspirin, 325 mg, Oral, Daily  budesonide-formoterol, 2 puff, Inhalation, BID - RT  citalopram, 10 mg, Oral, Daily  enoxaparin, 40 mg, Subcutaneous, Q24H  fentaNYL, 1 patch, Transdermal, Q72H  folic acid, 1 mg, Oral, Daily  furosemide, 20 mg, Intravenous, BID  guaiFENesin, 600 mg, Oral, Q12H  hydroxyurea, 1,000 mg, Oral, BID  hydroxyurea, 500 mg, Oral, Daily  imatinib, 400 mg, Oral, Daily  insulin glargine, 30 Units, Subcutaneous, Daily  insulin lispro, 10 Units, Subcutaneous, TID AC  insulin lispro, 2-9 Units, Subcutaneous, TID With Meals  mirtazapine, 15 mg, Oral, Nightly  sodium chloride, 250 mL, Intravenous, Once  sodium chloride, 10 mL, Intravenous, Q12H       Continuous Infusions:      PRN Meds:  •  acetaminophen **OR** acetaminophen **OR** acetaminophen  •  ALPRAZolam  •  aluminum-magnesium hydroxide-simethicone  •  dextrose  •  dextrose  •  glucagon (human recombinant)  •  HYDROcodone-acetaminophen  •  influenza vaccine  •   "melatonin  •  nitroglycerin  •  ondansetron **OR** ondansetron  •  potassium chloride **OR** potassium chloride **OR** potassium chloride  •  sodium chloride  •  sodium chloride     ALLERGIES:    Allergies   Allergen Reactions   • Hydromorphone GI Intolerance     dilaudid   • Morphine Nausea And Vomiting   • Propofol Hives     Previously tolerated being premedicated with diphenhydramine and famotadine       Objective    VITALS:   /84 (BP Location: Left arm, Patient Position: Sitting)   Pulse 114   Temp 98.2 °F (36.8 °C) (Oral)   Resp 18   Ht 162.6 cm (64\")   Wt 68 kg (150 lb)   LMP 05/25/2022 (Approximate)   SpO2 96%   BMI 25.75 kg/m²     PHYSICAL EXAM: (performed by MD)  Physical Exam  Vitals and nursing note reviewed.   Constitutional:       General: She is not in acute distress.     Appearance: She is not diaphoretic.   HENT:      Head: Normocephalic and atraumatic.   Eyes:      General: No scleral icterus.        Right eye: No discharge.         Left eye: No discharge.      Conjunctiva/sclera: Conjunctivae normal.   Neck:      Thyroid: No thyromegaly.   Cardiovascular:      Rate and Rhythm: Normal rate and regular rhythm.      Heart sounds: Normal heart sounds.     No friction rub. No gallop.   Pulmonary:      Effort: Pulmonary effort is normal. No respiratory distress.      Breath sounds: No stridor. No wheezing or rales.   Abdominal:      General: Bowel sounds are normal.      Palpations: Abdomen is soft. There is no mass.      Tenderness: There is no abdominal tenderness. There is no guarding or rebound.   Musculoskeletal:         General: No tenderness. Normal range of motion.      Cervical back: Normal range of motion and neck supple.   Lymphadenopathy:      Cervical: No cervical adenopathy.   Skin:     General: Skin is warm.      Findings: No erythema or rash.   Neurological:      Mental Status: She is alert and oriented to person, place, and time.      Motor: No abnormal muscle tone. " "  Psychiatric:         Behavior: Behavior normal.           RECENT LABS:  Lab Results (last 24 hours)     Procedure Component Value Units Date/Time    POC Glucose Once [[id number]]  (Abnormal) Collected: 11/05/22 1125    Specimen: Blood Updated: 11/05/22 1126     Glucose 127 mg/dL      Comment: Serial Number: [id number]:  [id number]       Lactate Dehydrogenase [[id number]]  (Abnormal) Collected: 11/05/22 0901    Specimen: Blood Updated: 11/05/22 0952     LDH >2,500 U/L     Procalcitonin [[id number]]  (Abnormal) Collected: 11/05/22 0901    Specimen: Blood Updated: 11/05/22 0946     Procalcitonin 0.38 ng/mL     Narrative:      As a Marker for Sepsis (Non-Neonates):    1. <0.5 ng/mL represents a low risk of severe sepsis and/or septic shock.  2. >2 ng/mL represents a high risk of severe sepsis and/or septic shock.    As a Marker for Lower Respiratory Tract Infections that require antibiotic therapy:    PCT on Admission    Antibiotic Therapy       6-12 Hrs later    >0.5                Strongly Recommended  >0.25 - <0.5        Recommended   0.1 - 0.25          Discouraged              Remeasure/reassess PCT  <0.1                Strongly Discouraged     Remeasure/reassess PCT    As 28 day mortality risk marker: \"Change in Procalcitonin Result\" (>80% or <=80%) if Day 0 (or Day 1) and Day 4 values are available. Refer to http://www.[facility]-pct-calculator.com    Change in PCT <=80%  A decrease of PCT levels below or equal to 80% defines a positive change in PCT test result representing a higher risk for 28-day all-cause mortality of patients diagnosed with severe sepsis for septic shock.    Change in PCT >80%  A decrease of PCT levels of more than 80% defines a negative change in PCT result representing a lower risk for 28-day all-cause mortality of patients diagnosed with severe sepsis or septic shock.       Uric Acid [[id number]]  (Abnormal) Collected: 11/05/22 0901    Specimen: Blood Updated: 11/05/22 0939     Uric Acid " 15.0 mg/dL     Blood Culture - Blood, Arm, Left [[id number]] Collected: 11/05/22 0901    Specimen: Blood from Arm, Left Updated: 11/05/22 0909    POC Glucose Once [[id number]]  (Abnormal) Collected: 11/05/22 0706    Specimen: Blood Updated: 11/05/22 0709     Glucose 131 mg/dL      Comment: Serial Number: [id number]:  [id number]       Basic Metabolic Panel [[id number]]  (Abnormal) Collected: 11/05/22 0149    Specimen: Blood Updated: 11/05/22 0325     Glucose 190 mg/dL      BUN 26 mg/dL      Creatinine 1.02 mg/dL      Sodium 135 mmol/L      Potassium 4.5 mmol/L      Chloride 96 mmol/L      CO2 27.0 mmol/L      Calcium 8.3 mg/dL      BUN/Creatinine Ratio 25.5     Anion Gap 12.0 mmol/L      eGFR 68.4 mL/min/1.73      Comment: National Kidney Foundation and American Society of Nephrology (ASN) Task Force recommended calculation based on the Chronic Kidney Disease Epidemiology Collaboration (CKD-EPI) equation refit without adjustment for race.       Narrative:      GFR Normal >60  Chronic Kidney Disease <60  Kidney Failure <15      Manual Differential [[id number]]  (Abnormal) Collected: 11/05/22 0149    Specimen: Blood Updated: 11/05/22 0323     Neutrophil % 19.0 %      Lymphocyte % 1.0 %      Monocyte % 1.0 %      Eosinophil % 2.0 %      Basophil % 10.0 %      Bands %  12.0 %      Metamyelocyte % 3.0 %      Myelocyte % 5.0 %      Blasts % 47.0 %      Neutrophils Absolute 88.32 10*3/mm3      Lymphocytes Absolute 2.85 10*3/mm3      Monocytes Absolute 2.85 10*3/mm3      Eosinophils Absolute 5.70 10*3/mm3      Basophils Absolute 28.49 10*3/mm3      Anisocytosis Slight/1+     Polychromasia Slight/1+     WBC Morphology Normal     Platelet Morphology Normal    Narrative:      Reviewed by Pathologist within the past 30 days on 10.31.22 .     CBC & Differential [[id number]]  (Abnormal) Collected: 11/05/22 0149    Specimen: Blood Updated: 11/05/22 0323    Narrative:      The following orders were created for panel order CBC &  Differential.  Procedure                               Abnormality         Status                     ---------                               -----------         ------                     CBC Auto Differential[[id number]]        Abnormal            Final result               Scan Slide[[id number]]                                       Final result                 Please view results for these tests on the individual orders.    Scan Slide [[id number]] Collected: 11/05/22 0149    Specimen: Blood Updated: 11/05/22 0323     Scan Slide --     Comment: See Manual Differential Results       CBC Auto Differential [[id number]]  (Abnormal) Collected: 11/05/22 0149    Specimen: Blood Updated: 11/05/22 0323     .90 10*3/mm3      RBC 3.48 10*6/mm3      Hemoglobin 8.4 g/dL      Hematocrit 27.3 %      MCV 78.7 fL      Comment: Result checked          MCH 24.3 pg      MCHC 30.8 g/dL      RDW 18.4 %      RDW-SD 49.9 fl      MPV 8.5 fL      Platelets 439 10*3/mm3     Narrative:      Modified report. Previous result was Hemogram on 11/5/2022 at 0252 EDT.  The previously reported component NRBC is no longer being reported. Previous result was 0.2 /100 WBC (Reference Range: 0.0-0.2 /100 WBC) on 11/5/2022 at 0252 EDT.    Flow Cytometry [[id number]] Collected: 11/03/22 1643    Specimen: Blood Updated: 11/04/22 2019     Consult Global Result Comment^Text^TXT     Comment: Peripheral Blood:  1) Relatively increased neutrophilic cells with all stages   of maturation present.  2) Increased atypical myeloid blasts (13% of leukocytes).  3) Aberrant CD56 expression on granulocytes and monocytes.  4) Increased basophils (7% of leukocytes).  DISCLAIMER: REFER TO HARDCOPY OR PDF FOR COMPLETE RESULT.   If synopsis provided, clinical decisions should not be   based on this interfaced synopsis alone.  Performed at:  1 - [facility]  [address], [place]  [address]  :  Molly Vega M.D.,  Ph.D., Phone:  [phone]       Narrative:      Performed at:  1 - [facility]  [address], [place] [address]  :  Molly Vega M.D., Ph.D., Phone:  [phone]    POC Glucose Once [[id number]]  (Abnormal) Collected: 11/04/22 1602    Specimen: Blood Updated: 11/04/22 1603     Glucose 136 mg/dL      Comment: Serial Number: [id number]:  [id number]             PENDING RESULTS: Flow cytometry    IMAGING REVIEWED:  No radiology results for the last day    Assessment & Plan      ASSESSMENT:  Hematology/Oncology was consulted on a patient known to us and followed in the office by Dr. Lerma for her diagnosis of CML currently on imatinib and hydroxyurea.  Patient has been very noncompliant with treatments.  She is currently on Gleevec 400 mg daily but this was recently increased to 600 mg daily.  She is also supposed to be on hydroxyurea twice a day.  She has been noncompliant with medication intake and also routine follow-up in the office for ongoing care.  Patient has had progressive shortness of breath and swelling on her lower extremities.      1. CML not in remission: Currently on imatinib with plan to increase dose to 600 mg daily at last visit.  On hydroxyurea 1 g twice a day. Patient has been noncompliant with her medicines. Flow cytometry - increased neutrophilic cells, increased atypical myeloid blasts (13% of leukocytes), aberrant CD56 expression on granulocytes and monocytes, increased basophils (7% of leukocytes).  Currently receiving Imatinib 400 mg daily, Hydrea 2500 mg daily, and allopurinol 100 mg daily.   2. Anemia: Secondary to CML and TKI, hydroxyurea. Received 1 unit PRBC's 11/4/22.  Hgb 8.4 today  3. Hyperleukocytosis: Secondary to CML. .9 today  4. History of pulmonary embolism: On Xarelto as an outpatient.  Lovenox currently. Continues Lovenox  5. Acute on chronic diastolic heart failure: Management per primary team and  cardiology.  6. Multiple chronic conditions: Type 2 diabetes mellitus, hypertension, depression with anxiety.  7. CHF: Cardiology following    PLAN:  1. Continue CBC daily with diff  2. We will request a psychiatry consult to evaluate her depression and anxiety as factors for her noncompliance with medications   3. Discussed with patient that she may be going into blast crisis and the indications for chemotherapy. Her last 2D echo completed in June 2022 showed an EF of 41 to 45%.  Discussed that her condition is serious  4. Continue Imatinib 400 mg daily, Hydrea 2500 mg daily, Allopurinol 100 mg daily      Electronically signed by JP Griffin, 11/05/22, 12:28 PM EDT.    Ms. Mc felt better today. Still fatigued. Able to eat some. No nausea. No more chest pain but persists with dyspnea. No dysphagia. No abdominal pain. Persists with edema. On exam she appears chronically ill. No distress. No jaundice. No oral lesions. Respirations not labored. The lungs are diminished bilaterally and the heart is regular. The abdomen is protuberant and soft. There is edema. Reviewed the laboratory exams. Has started to take the imatinib. Discussed with her the importance of taking the imatinib with the largest meal of the day, particularly with fatty foods. Will continue to follow.     Yifan Truong MD on 11/5/2022 at 12:56 PM.

## 2022-11-05 NOTE — PROGRESS NOTES
[facility] Medicine Services Daily Progress Note    Patient Name: Nieves Mc  : 1975  MRN: [id number]  Primary Care Physician:  Alida Latham APRN  Date of admission: 11/3/2022      Subjective      Brief interim history: 47-year-old female with nonischemic cardiomyopathy, HFpEF, PE, anxiety, T2DM, history of medical noncompliance, depression/anxiety and CML.  She was admitted on 11/3/2022, presented to [facility] ED, complaining of chest pain that started at around 9 AM while she was at home with her .  Chest pain is described as dull in nature, radiating to the left with paresthesia.  She denies shortness of breath, no diaphoresis, no dizziness or lightheadedness.  Cardiac biomarkers with troponin negative x3 and EKG shows sinus tachycardia.  Laboratories notable for proBNP of 08934 and chest x-ray showed cardiomegaly.  In ED, she was treated with nitroglycerin with improvement.  Patient was seen in consultation by cardiologist, and hematologist with recommendations. Patient is admitted with atypical chest pain chest pain, rule out for ACS.     2022: Seen and examined in follow evaluation.  Chest pain-free and resting comfortably in bed.    2022: Seen and examined in follow-up.  Feels and looks better this morning.  No complaints or event.         Objective      Vitals:   Temp:  [97.8 °F (36.6 °C)-98.7 °F (37.1 °C)] 98.2 °F (36.8 °C)  Heart Rate:  [] 114  Resp:  [16-18] 18  BP: (113-133)/(73-86) 133/84  Flow (L/min):  [2] 2    Physical Exam   Constitutional:       General: She is not in acute distress.     Appearance: Normal appearance. She is obese. She is not ill-appearing.   HENT:      Head: Normocephalic.      Nose: Nose normal.      Mouth/Throat:      Mouth: Mucous membranes are moist.   Eyes:      Pupils: Pupils are equal, round, and reactive to light.   Cardiovascular:      Rate and Rhythm: Normal rate.      Pulses: Normal pulses.   Pulmonary:       Effort: Pulmonary effort is normal.   Abdominal:      Palpations: Abdomen is soft.   Musculoskeletal:         General: No swelling or tenderness. Normal range of motion.      Cervical back: Neck supple.   Skin:     General: Skin is warm.   Neurological:      General: No focal deficit present.      Mental Status: She is alert.   Psychiatric:         Mood and Affect: Mood normal.      Result Review    Result Review:  I have personally reviewed the results from the time of this admission to 11/5/2022 12:06 EDT and agree with these findings:  []  Laboratory  []  Microbiology  []  Radiology  []  EKG/Telemetry   []  Cardiology/Vascular   []  Pathology  []  Old records  []  Other:  Most notable findings include:          Assessment & Plan            allopurinol, 100 mg, Oral, Daily  aspirin, 325 mg, Oral, Daily  budesonide-formoterol, 2 puff, Inhalation, BID - RT  citalopram, 10 mg, Oral, Daily  enoxaparin, 40 mg, Subcutaneous, Q24H  fentaNYL, 1 patch, Transdermal, Q72H  folic acid, 1 mg, Oral, Daily  furosemide, 20 mg, Intravenous, BID  guaiFENesin, 600 mg, Oral, Q12H  hydroxyurea, 1,000 mg, Oral, BID  hydroxyurea, 500 mg, Oral, Daily  imatinib, 400 mg, Oral, Daily  insulin glargine, 30 Units, Subcutaneous, Daily  insulin lispro, 10 Units, Subcutaneous, TID AC  insulin lispro, 2-9 Units, Subcutaneous, TID With Meals  mirtazapine, 15 mg, Oral, Nightly  sodium chloride, 250 mL, Intravenous, Once  sodium chloride, 10 mL, Intravenous, Q12H             Active Hospital Problems:  Active Hospital Problems    Diagnosis    • **Chest pain, unspecified type    • Acute diastolic CHF (congestive heart failure) ([facility])    • NICM (nonischemic cardiomyopathy) ([facility])    • Type 2 diabetes mellitus with diabetic polyneuropathy, with long-term current use of insulin ([facility])    • Depression with anxiety    • Medically noncompliant    • History of pulmonary embolism    • CML (chronic myelocytic leukemia) ([facility])      Assessment/plan:     Chest pain  (atypical)      -resolved, aspirin/losartan /metoprolol at home       -ruled out for ACS     Nonischemic cardiomyopathy        -see below, metoprolol/losartan at home (currently on hold) due to soft BP      Acute on chronic HFrEF(2D echo on 6/22, with estimated EF of 45%)        -elevated BNP 29,000         -Lasix     History of pulmonary embolus       -stable     DM       -Lantus/SSI     CML      -Gleevec/hydroxyurea     Anxiety/depression       -Remeron/Celexa  Medical noncompliance      -continue to encourage adherence and compliance to treatment         DVT prophylaxis:  Medical DVT prophylaxis orders are present.    CODE STATUS:    Level Of Support Discussed With: Patient  Code Status (Patient has no pulse and is not breathing): CPR (Attempt to Resuscitate)  Medical Interventions (Patient has pulse or is breathing): Full Support      Disposition:  I expect patient to be discharged     Electronically signed by Refugio Rod MD, 11/05/22, 12:07 EDT.  [facility] Hospitalist Team

## 2022-11-05 NOTE — CONSULTS
Referring Provider: winsome TRAMMELL   Reason for Consultation: depression anxiety       Chief complaint depression     Subjective .     History of present illness:  The patient is a 47 y.o. female who was admitted secondary to chest pain. PMHx: DM, COVID, bone pain, CML. Psych consult was requested by Winsome TRAMMELL 2ry to depression, anxiety and non compliance.   The pt denied any major issues with her mood or depression until July 2022 when she was dsd with CML. The pt reported feeling depressed now and rated depression as 9/10, she  Feels she is a burden for her family and feels at time they would be better off when she dies. The pt denied any active plan to kill herself, denied AVH/SI/HI. Depression is associated with low self esteem, poor motivations, guilt feelings.   The pt c/o insomnia, weight loss.   The pt claimed to be complaint with meds, stated chemo was stopped few times 2ry to side effects after discussion with Dr Lerma   Past psych hx: depression after she was dsd with CML, no hx of SA        Review of Systems   All systems were reviewed and negative except for:  Constitution:  positive for fatigue and weight loss  Hematologic / Lymphatic: positive for  bone pain   Musculoskeletal: positive for  bone pain and muscle weakness  Behavioral/Psych: positive for  anxiety and depression    History    Past Medical History:   Diagnosis Date   • Bone pain    • COVID-19 virus infection 01/12/2022   • Diabetes mellitus (HCC)    • Extremity pain     Carlin. legs pain   • Leg pain     left leg greater   • Migraine    • Pulmonary embolism (HCC)    • Vision loss     doing surgery          Family History   Problem Relation Age of Onset   • Diabetes Mother    • Diabetes Maternal Grandmother    • Heart attack Maternal Grandmother    • Stroke Maternal Grandmother         Social History     Tobacco Use   • Smoking status: Never   • Smokeless tobacco: Never   Vaping Use   • Vaping Use: Never used   Substance  Use Topics   • Alcohol use: No   • Drug use: No          Medications Prior to Admission   Medication Sig Dispense Refill Last Dose   • allopurinol (ZYLOPRIM) 100 MG tablet Take 1 tablet by mouth Daily. 30 tablet 0    • ALPRAZolam (Xanax) 0.25 MG tablet Take 1 tablet by mouth At Night As Needed for Anxiety. 30 tablet 0    • budesonide-formoterol (SYMBICORT) 160-4.5 MCG/ACT inhaler Inhale 2 puffs 2 (Two) Times a Day. 10.2 g 1    • citalopram (CeleXA) 10 MG tablet Take 1 tablet by mouth Daily. To begin 1/31/22 30 tablet 2    • fentaNYL (DURAGESIC) 25 MCG/HR patch Place 1 patch on the skin as directed by provider Every 72 (Seventy-Two) Hours. 10 each 0 11/1/2022   • folic acid (FOLVITE) 1 MG tablet Take 1 tablet by mouth Daily. 30 tablet 0    • guaiFENesin (MUCINEX) 600 MG 12 hr tablet Take 1 tablet by mouth Every 12 (Twelve) Hours. 30 tablet 0    • hydroxyurea (HYDREA) 500 MG capsule Take 2 capsules by mouth 2 (Two) Times a Day. 120 capsule 0    • hydroxyurea (HYDREA) 500 MG capsule Take 1 capsule by mouth Daily. @ 1400      • hydrOXYzine (ATARAX) 25 MG tablet Take 1 tablet by mouth Every 6 (Six) Hours As Needed for Itching. 20 tablet 0    • imatinib (GLEEVEC) 400 MG chemo tablet Take 1 tablet by mouth Daily for 30 days. Take with food and glass of water. Do not take with Grapefruit juice. 30 tablet 5    • Insulin Glargine (BASAGLAR KWIKPEN) 100 UNIT/ML injection pen Inject 20 Units under the skin into the appropriate area as directed Daily. 10 mL 3    • Insulin Lispro (ADMELOG SOLOSTAR) 100 UNIT/ML injection pen Inject 6 Units under the skin into the appropriate area as directed 3 (Three) Times a Day. 3 mL 3    • losartan (COZAAR) 25 MG tablet Take 1 tablet by mouth Daily. 30 tablet 0    • metoprolol succinate XL (TOPROL-XL) 25 MG 24 hr tablet Take 25 mg by mouth Daily.      • mirtazapine (REMERON) 15 MG tablet Take 1 tablet by mouth Every Night for 30 days. Start Mirtazapine and decrease Trazodone to 1/2 tablet x 14  days and then stop Trazodone. 30 tablet 2    • ondansetron ODT (ZOFRAN-ODT) 8 MG disintegrating tablet Place 1 tablet on the tongue Every 8 (Eight) Hours As Needed for Nausea or Vomiting. 20 tablet 2    • imatinib (GLEEVEC) 100 MG chemo tablet Take 2 tablets by mouth with 1 other imatinib prescription for 600 mg total Daily. Take with food and large glass of water; Do not take with Grapefruit juice. 60 tablet 2    • imatinib (GLEEVEC) 400 MG chemo tablet Take 1 tablet by mouth with 1 other imatinib prescription for 600 mg total Daily. Take with food and large glass of water; Do not take with Grapefruit juice. 30 tablet 2         Scheduled Meds:  allopurinol, 100 mg, Oral, Daily  aspirin, 325 mg, Oral, Daily  budesonide-formoterol, 2 puff, Inhalation, BID - RT  citalopram, 10 mg, Oral, Daily  enoxaparin, 40 mg, Subcutaneous, Q24H  fentaNYL, 1 patch, Transdermal, Q72H  folic acid, 1 mg, Oral, Daily  furosemide, 20 mg, Intravenous, BID  guaiFENesin, 600 mg, Oral, Q12H  hydroxyurea, 1,000 mg, Oral, BID  hydroxyurea, 500 mg, Oral, Daily  imatinib, 400 mg, Oral, Daily  insulin glargine, 30 Units, Subcutaneous, Daily  insulin lispro, 10 Units, Subcutaneous, TID AC  insulin lispro, 2-9 Units, Subcutaneous, TID With Meals  mirtazapine, 15 mg, Oral, Nightly  sodium chloride, 250 mL, Intravenous, Once  sodium chloride, 10 mL, Intravenous, Q12H         Continuous Infusions:       PRN Meds:  •  acetaminophen **OR** acetaminophen **OR** acetaminophen  •  ALPRAZolam  •  aluminum-magnesium hydroxide-simethicone  •  dextrose  •  dextrose  •  glucagon (human recombinant)  •  HYDROcodone-acetaminophen  •  influenza vaccine  •  melatonin  •  nitroglycerin  •  ondansetron **OR** ondansetron  •  potassium chloride **OR** potassium chloride **OR** potassium chloride  •  sodium chloride  •  sodium chloride      Allergies:  Hydromorphone, Morphine, and Propofol      Objective     Vital Signs   /84 (BP Location: Left arm, Patient  "Position: Sitting)   Pulse 114   Temp 98.2 °F (36.8 °C) (Oral)   Resp 18   Ht 162.6 cm (64\")   Wt 68 kg (150 lb)   LMP 05/25/2022 (Approximate)   SpO2 96%   BMI 25.75 kg/m²     Physical Exam:    Musculoskeletal:   Muscle strength and tone:  WNL   Abnormal Movements: none   Gait: unable to assess, the pt was in bed      General Appearance:    In NAD       Mental Status Exam:   Hygiene:   good  Cooperation:  Cooperative  Eye Contact:  Good  Behavior and Psychomotor Activity: Appropriate  Speech:  Normal  Mood: depressed   Affect:  Appropriate  Thought Process:  Goal directed, Linear and Organized  Associations: Intact   Thought Content:  Normal and mood congruent   Language:  Appropriate   Suicidal Ideations:  None  Homicidal:  None  Hallucinations:  None  Delusion:  None  Orientation:  To person, Place, Time and Situation  Memory:  Intact  Concentration and computation: fair  Attention span: fair  Fund of knowledge: appropriate   Reliability:  good  Insight:  Good  Judgement:  Fair  Impulse Control:  Fair      Medications and allergies reviewed     Lab Results   Component Value Date    GLUCOSE 190 (H) 11/05/2022    CALCIUM 8.3 (L) 11/05/2022     (L) 11/05/2022    K 4.5 11/05/2022    CO2 27.0 11/05/2022    CL 96 (L) 11/05/2022    BUN 26 (H) 11/05/2022    CREATININE 1.02 (H) 11/05/2022    EGFRIFNONA 133 02/02/2022    BCR 25.5 (H) 11/05/2022    ANIONGAP 12.0 11/05/2022       Last Urine Toxicity    There is no flowsheet data to display.         No results found for: PHENYTOIN, PHENOBARB, VALPROATE, CBMZ    Lab Results   Component Value Date     (L) 11/05/2022    BUN 26 (H) 11/05/2022    CREATININE 1.02 (H) 11/05/2022    TSH 1.070 01/13/2022    .90 (C) 11/05/2022       Brief Urine Lab Results  (Last result in the past 365 days)      Color   Clarity   Blood   Leuk Est   Nitrite   Protein   CREAT   Urine HCG        07/05/22 1620 Yellow   Clear   Large (3+)   Trace   Negative   Negative           "       Assessment & Plan       Chest pain, unspecified type    CML (chronic myelocytic leukemia) (HCC)    Depression with anxiety    History of pulmonary embolism    Medically noncompliant    Type 2 diabetes mellitus with diabetic polyneuropathy, with long-term current use of insulin (HCC)    Acute diastolic CHF (congestive heart failure) ([facility])    NICM (nonischemic cardiomyopathy) ([facility])        Assessment: Mood d/o 2ry to medical condition (CML)   Treatment Plan: the pt is depressed, but denied any intention to kill herself   She is currently on celexa 10 mg , cont on current dose   Increase remeron to 30 mg po QHS   Cont to provide support  The pt will benefit from counseling after d/c   Will follow   Treatment Plan discussed with: Patient, Family and nursing       I discussed the patients findings and my recommendations with patient, family and nursing staff    I have reviewed and approved the behavioral health treatment plans and problem list. Yes  Thank you for the consult   Referring MD has access to consult report and progress notes in EMR     Tish Gonzales MD  11/05/22  12:51 EDT

## 2022-11-05 NOTE — PLAN OF CARE
Goal Outcome Evaluation:  Plan of Care Reviewed With: patient        Progress: no change  Outcome Evaluation: Pt received 1 unit PRBC this shift, and tolerated well Hgb up to 8.4. Pt rested through the night with no complaints. Will continue to monitor at this time

## 2022-11-06 ENCOUNTER — APPOINTMENT (OUTPATIENT)
Dept: NUCLEAR MEDICINE | Facility: HOSPITAL | Age: 47
End: 2022-11-06

## 2022-11-06 LAB
ANION GAP SERPL CALCULATED.3IONS-SCNC: 12 MMOL/L (ref 5–15)
ANISOCYTOSIS BLD QL: ABNORMAL
BASOPHILS # BLD MANUAL: 43.22 10*3/MM3 (ref 0–0.2)
BASOPHILS NFR BLD MANUAL: 18 % (ref 0–1.5)
BH BB BLOOD EXPIRATION DATE: NORMAL
BH BB BLOOD TYPE BARCODE: 6200
BH BB DISPENSE STATUS: NORMAL
BH BB PRODUCT CODE: NORMAL
BH BB UNIT NUMBER: NORMAL
BH CV NUCLEAR PRIOR STUDY: 3
BH CV REST NUCLEAR ISOTOPE DOSE: 10.4 MCI
BH CV STRESS BP STAGE 3: NORMAL
BH CV STRESS COMMENTS STAGE 1: NORMAL
BH CV STRESS COMMENTS STAGE 2: NORMAL
BH CV STRESS DOSE REGADENOSON STAGE 1: 0.4
BH CV STRESS DURATION MIN STAGE 1: 0
BH CV STRESS DURATION MIN STAGE 2: 4
BH CV STRESS DURATION SEC STAGE 1: 10
BH CV STRESS DURATION SEC STAGE 2: 0
BH CV STRESS HR STAGE 1: 93
BH CV STRESS HR STAGE 2: 104
BH CV STRESS HR STAGE 3: 105
BH CV STRESS NUCLEAR ISOTOPE DOSE: 33 MCI
BH CV STRESS PROTOCOL 1: NORMAL
BH CV STRESS RECOVERY BP: NORMAL MMHG
BH CV STRESS RECOVERY HR: 101 BPM
BH CV STRESS STAGE 1: 1
BH CV STRESS STAGE 2: 2
BH CV STRESS STAGE 3: 3
BLASTS NFR BLD MANUAL: 30 % (ref 0–0)
BUN SERPL-MCNC: 30 MG/DL (ref 6–20)
BUN/CREAT SERPL: 33.3 (ref 7–25)
CALCIUM SPEC-SCNC: 7.9 MG/DL (ref 8.6–10.5)
CHLORIDE SERPL-SCNC: 94 MMOL/L (ref 98–107)
CO2 SERPL-SCNC: 27 MMOL/L (ref 22–29)
CREAT SERPL-MCNC: 0.9 MG/DL (ref 0.57–1)
CROSSMATCH INTERPRETATION: NORMAL
DEPRECATED RDW RBC AUTO: 49.9 FL (ref 37–54)
EGFRCR SERPLBLD CKD-EPI 2021: 79.5 ML/MIN/1.73
EOSINOPHIL # BLD MANUAL: 38.42 10*3/MM3 (ref 0–0.4)
EOSINOPHIL NFR BLD MANUAL: 16 % (ref 0.3–6.2)
ERYTHROCYTE [DISTWIDTH] IN BLOOD BY AUTOMATED COUNT: 17.8 % (ref 12.3–15.4)
GIANT PLATELETS: ABNORMAL
GLUCOSE BLDC GLUCOMTR-MCNC: 156 MG/DL (ref 70–105)
GLUCOSE BLDC GLUCOMTR-MCNC: 194 MG/DL (ref 70–105)
GLUCOSE BLDC GLUCOMTR-MCNC: 213 MG/DL (ref 70–105)
GLUCOSE SERPL-MCNC: 199 MG/DL (ref 65–99)
HCT VFR BLD AUTO: 27.2 % (ref 34–46.6)
HGB BLD-MCNC: 8.6 G/DL (ref 12–15.9)
LV EF NUC BP: 50 %
LYMPHOCYTES # BLD MANUAL: 7.2 10*3/MM3 (ref 0.7–3.1)
MAXIMAL PREDICTED HEART RATE: 173 BPM
MCH RBC QN AUTO: 24.5 PG (ref 26.6–33)
MCHC RBC AUTO-ENTMCNC: 31.6 G/DL (ref 31.5–35.7)
MCV RBC AUTO: 77.5 FL (ref 79–97)
METAMYELOCYTES NFR BLD MANUAL: 5 % (ref 0–0)
MICROCYTES BLD QL: ABNORMAL
NEUTROPHILS # BLD AUTO: 31.21 10*3/MM3 (ref 1.7–7)
NEUTROPHILS NFR BLD MANUAL: 13 % (ref 42.7–76)
PERCENT MAX PREDICTED HR: 60.69 %
PLATELET # BLD AUTO: 449 10*3/MM3 (ref 140–450)
PMV BLD AUTO: 8.2 FL (ref 6–12)
POTASSIUM SERPL-SCNC: 4.6 MMOL/L (ref 3.5–5.2)
PROMYELOCYTES NFR BLD MANUAL: 15 % (ref 0–0)
QT INTERVAL: 343 MS
RBC # BLD AUTO: 3.51 10*6/MM3 (ref 3.77–5.28)
SCAN SLIDE: NORMAL
SODIUM SERPL-SCNC: 133 MMOL/L (ref 136–145)
STRESS BASELINE BP: NORMAL MMHG
STRESS BASELINE HR: 93 BPM
STRESS PERCENT HR: 71 %
STRESS POST PEAK BP: NORMAL MMHG
STRESS POST PEAK HR: 105 BPM
STRESS TARGET HR: 147 BPM
UNIT  ABO: NORMAL
UNIT  RH: NORMAL
VARIANT LYMPHS NFR BLD MANUAL: 3 % (ref 19.6–45.3)
WBC MORPH BLD: NORMAL
WBC NRBC COR # BLD: 240.1 10*3/MM3 (ref 3.4–10.8)

## 2022-11-06 PROCEDURE — A9502 TC99M TETROFOSMIN: HCPCS | Performed by: INTERNAL MEDICINE

## 2022-11-06 PROCEDURE — 82962 GLUCOSE BLOOD TEST: CPT

## 2022-11-06 PROCEDURE — 85025 COMPLETE CBC W/AUTO DIFF WBC: CPT | Performed by: NURSE PRACTITIONER

## 2022-11-06 PROCEDURE — 25010000002 FUROSEMIDE PER 20 MG: Performed by: NURSE PRACTITIONER

## 2022-11-06 PROCEDURE — 63710000001 INSULIN GLARGINE PER 5 UNITS: Performed by: NURSE PRACTITIONER

## 2022-11-06 PROCEDURE — 99232 SBSQ HOSP IP/OBS MODERATE 35: CPT | Performed by: INTERNAL MEDICINE

## 2022-11-06 PROCEDURE — 0 TECHNETIUM TETROFOSMIN KIT: Performed by: INTERNAL MEDICINE

## 2022-11-06 PROCEDURE — 78452 HT MUSCLE IMAGE SPECT MULT: CPT

## 2022-11-06 PROCEDURE — 85007 BL SMEAR W/DIFF WBC COUNT: CPT | Performed by: NURSE PRACTITIONER

## 2022-11-06 PROCEDURE — 94799 UNLISTED PULMONARY SVC/PX: CPT

## 2022-11-06 PROCEDURE — 99231 SBSQ HOSP IP/OBS SF/LOW 25: CPT | Performed by: PSYCHIATRY & NEUROLOGY

## 2022-11-06 PROCEDURE — 63710000001 INSULIN LISPRO (HUMAN) PER 5 UNITS: Performed by: NURSE PRACTITIONER

## 2022-11-06 PROCEDURE — 93017 CV STRESS TEST TRACING ONLY: CPT

## 2022-11-06 PROCEDURE — 78452 HT MUSCLE IMAGE SPECT MULT: CPT | Performed by: INTERNAL MEDICINE

## 2022-11-06 PROCEDURE — 80048 BASIC METABOLIC PNL TOTAL CA: CPT | Performed by: INTERNAL MEDICINE

## 2022-11-06 PROCEDURE — 25010000002 REGADENOSON 0.4 MG/5ML SOLUTION: Performed by: INTERNAL MEDICINE

## 2022-11-06 PROCEDURE — 93018 CV STRESS TEST I&R ONLY: CPT | Performed by: INTERNAL MEDICINE

## 2022-11-06 PROCEDURE — 25010000002 ENOXAPARIN PER 10 MG: Performed by: NURSE PRACTITIONER

## 2022-11-06 PROCEDURE — 25010000002 ONDANSETRON PER 1 MG: Performed by: NURSE PRACTITIONER

## 2022-11-06 RX ORDER — ALLOPURINOL 100 MG/1
100 TABLET ORAL EVERY 8 HOURS SCHEDULED
Status: DISCONTINUED | OUTPATIENT
Start: 2022-11-06 | End: 2022-11-17 | Stop reason: HOSPADM

## 2022-11-06 RX ORDER — DIPHENHYDRAMINE HYDROCHLORIDE AND LIDOCAINE HYDROCHLORIDE AND ALUMINUM HYDROXIDE AND MAGNESIUM HYDRO
5 KIT EVERY 4 HOURS
Status: DISCONTINUED | OUTPATIENT
Start: 2022-11-06 | End: 2022-11-17 | Stop reason: HOSPADM

## 2022-11-06 RX ADMIN — ALLOPURINOL 100 MG: 100 TABLET ORAL at 20:41

## 2022-11-06 RX ADMIN — INSULIN LISPRO 2 UNITS: 100 INJECTION, SOLUTION INTRAVENOUS; SUBCUTANEOUS at 10:01

## 2022-11-06 RX ADMIN — CARVEDILOL 6.25 MG: 6.25 TABLET, FILM COATED ORAL at 10:01

## 2022-11-06 RX ADMIN — GUAIFENESIN 600 MG: 600 TABLET, EXTENDED RELEASE ORAL at 20:41

## 2022-11-06 RX ADMIN — REGADENOSON 0.4 MG: 0.08 INJECTION, SOLUTION INTRAVENOUS at 08:47

## 2022-11-06 RX ADMIN — Medication 10 ML: at 10:01

## 2022-11-06 RX ADMIN — ENOXAPARIN SODIUM 40 MG: 100 INJECTION SUBCUTANEOUS at 17:45

## 2022-11-06 RX ADMIN — INSULIN LISPRO 2 UNITS: 100 INJECTION, SOLUTION INTRAVENOUS; SUBCUTANEOUS at 12:46

## 2022-11-06 RX ADMIN — ONDANSETRON 4 MG: 2 INJECTION INTRAMUSCULAR; INTRAVENOUS at 13:27

## 2022-11-06 RX ADMIN — INSULIN LISPRO 10 UNITS: 100 INJECTION, SOLUTION INTRAVENOUS; SUBCUTANEOUS at 12:46

## 2022-11-06 RX ADMIN — FUROSEMIDE 20 MG: 10 INJECTION, SOLUTION INTRAMUSCULAR; INTRAVENOUS at 17:46

## 2022-11-06 RX ADMIN — TETROFOSMIN 1 DOSE: 1.38 INJECTION, POWDER, LYOPHILIZED, FOR SOLUTION INTRAVENOUS at 08:47

## 2022-11-06 RX ADMIN — ALLOPURINOL 100 MG: 100 TABLET ORAL at 13:33

## 2022-11-06 RX ADMIN — IMATINIB 400 MG: 400 TABLET ORAL at 11:53

## 2022-11-06 RX ADMIN — ASPIRIN 325 MG: 325 TABLET, COATED ORAL at 10:01

## 2022-11-06 RX ADMIN — MIRTAZAPINE 30 MG: 15 TABLET, FILM COATED ORAL at 20:41

## 2022-11-06 RX ADMIN — INSULIN LISPRO 2 UNITS: 100 INJECTION, SOLUTION INTRAVENOUS; SUBCUTANEOUS at 17:48

## 2022-11-06 RX ADMIN — GUAIFENESIN 600 MG: 600 TABLET, EXTENDED RELEASE ORAL at 10:01

## 2022-11-06 RX ADMIN — HYDROCODONE BITARTRATE AND ACETAMINOPHEN 1 TABLET: 7.5; 325 TABLET ORAL at 13:27

## 2022-11-06 RX ADMIN — INSULIN LISPRO 10 UNITS: 100 INJECTION, SOLUTION INTRAVENOUS; SUBCUTANEOUS at 10:00

## 2022-11-06 RX ADMIN — HYDROCODONE BITARTRATE AND ACETAMINOPHEN 1 TABLET: 7.5; 325 TABLET ORAL at 20:41

## 2022-11-06 RX ADMIN — INSULIN LISPRO 10 UNITS: 100 INJECTION, SOLUTION INTRAVENOUS; SUBCUTANEOUS at 17:45

## 2022-11-06 RX ADMIN — CITALOPRAM HYDROBROMIDE 10 MG: 20 TABLET ORAL at 10:01

## 2022-11-06 RX ADMIN — INSULIN GLARGINE 30 UNITS: 100 INJECTION, SOLUTION SUBCUTANEOUS at 10:02

## 2022-11-06 RX ADMIN — TETROFOSMIN 1 DOSE: 1.38 INJECTION, POWDER, LYOPHILIZED, FOR SOLUTION INTRAVENOUS at 07:50

## 2022-11-06 RX ADMIN — ALPRAZOLAM 0.5 MG: 0.5 TABLET ORAL at 20:41

## 2022-11-06 RX ADMIN — ALLOPURINOL 100 MG: 100 TABLET ORAL at 10:01

## 2022-11-06 RX ADMIN — BUDESONIDE AND FORMOTEROL FUMARATE DIHYDRATE 2 PUFF: 160; 4.5 AEROSOL RESPIRATORY (INHALATION) at 20:15

## 2022-11-06 RX ADMIN — FUROSEMIDE 20 MG: 10 INJECTION, SOLUTION INTRAMUSCULAR; INTRAVENOUS at 10:01

## 2022-11-06 RX ADMIN — DIPHENHYDRAMINE HYDROCHLORIDE AND LIDOCAINE HYDROCHLORIDE AND ALUMINUM HYDROXIDE AND MAGNESIUM HYDRO 5 ML: KIT at 17:45

## 2022-11-06 RX ADMIN — FOLIC ACID 1 MG: 1 TABLET ORAL at 10:01

## 2022-11-06 RX ADMIN — Medication 10 ML: at 20:42

## 2022-11-06 NOTE — PROGRESS NOTES
Chief complaint fatigue, depression     Subjective .     History of present illness:  The patient is a 47 y.o. female who was admitted secondary to chest pain. PMHx: DM, COVID, bone pain, CML. Psych consult was requested by Denisha TRAMMELL 2ry to depression, anxiety and non compliance.   The pt denied any major issues with her mood or depression until July 2022 when she was dsd with CML. The pt reported feeling depressed now and rated depression as 9/10, she  Feels she is a burden for her family and feels at time they would be better off when she dies. The pt denied any active plan to kill herself, denied AVH/SI/HI. Depression is associated with low self esteem, poor motivations, guilt feelings.   The pt c/o insomnia, weight loss.   The pt claimed to be complaint with meds, stated chemo was stopped few times 2ry to side effects after discussion with Dr Lerma   Past psych hx: depression after she was dsd with CML, no hx of SA    The pt was started on trazodone (hx of good response for insomnia), remeron was also increased   Today the pt reported good sleep last night   Depression is still intense and related mainly due to her health status   Denied AVH/SI/HI   Review of Systems   Pertinent items are noted in HPI, all other systems reviewed and negative    History        Medications Prior to Admission   Medication Sig Dispense Refill Last Dose   • allopurinol (ZYLOPRIM) 100 MG tablet Take 1 tablet by mouth Daily. 30 tablet 0    • ALPRAZolam (Xanax) 0.25 MG tablet Take 1 tablet by mouth At Night As Needed for Anxiety. 30 tablet 0    • budesonide-formoterol (SYMBICORT) 160-4.5 MCG/ACT inhaler Inhale 2 puffs 2 (Two) Times a Day. 10.2 g 1    • citalopram (CeleXA) 10 MG tablet Take 1 tablet by mouth Daily. To begin 1/31/22 30 tablet 2    • fentaNYL (DURAGESIC) 25 MCG/HR patch Place 1 patch on the skin as directed by provider Every 72 (Seventy-Two) Hours. 10 each 0 11/1/2022   • folic acid (FOLVITE) 1 MG tablet Take  1 tablet by mouth Daily. 30 tablet 0    • guaiFENesin (MUCINEX) 600 MG 12 hr tablet Take 1 tablet by mouth Every 12 (Twelve) Hours. 30 tablet 0    • hydroxyurea (HYDREA) 500 MG capsule Take 2 capsules by mouth 2 (Two) Times a Day. 120 capsule 0    • hydroxyurea (HYDREA) 500 MG capsule Take 1 capsule by mouth Daily. @ 1400      • hydrOXYzine (ATARAX) 25 MG tablet Take 1 tablet by mouth Every 6 (Six) Hours As Needed for Itching. 20 tablet 0    • imatinib (GLEEVEC) 400 MG chemo tablet Take 1 tablet by mouth Daily for 30 days. Take with food and glass of water. Do not take with Grapefruit juice. 30 tablet 5    • Insulin Glargine (BASAGLAR KWIKPEN) 100 UNIT/ML injection pen Inject 20 Units under the skin into the appropriate area as directed Daily. 10 mL 3    • Insulin Lispro (ADMELOG SOLOSTAR) 100 UNIT/ML injection pen Inject 6 Units under the skin into the appropriate area as directed 3 (Three) Times a Day. 3 mL 3    • losartan (COZAAR) 25 MG tablet Take 1 tablet by mouth Daily. 30 tablet 0    • metoprolol succinate XL (TOPROL-XL) 25 MG 24 hr tablet Take 25 mg by mouth Daily.      • mirtazapine (REMERON) 15 MG tablet Take 1 tablet by mouth Every Night for 30 days. Start Mirtazapine and decrease Trazodone to 1/2 tablet x 14 days and then stop Trazodone. 30 tablet 2    • ondansetron ODT (ZOFRAN-ODT) 8 MG disintegrating tablet Place 1 tablet on the tongue Every 8 (Eight) Hours As Needed for Nausea or Vomiting. 20 tablet 2    • imatinib (GLEEVEC) 100 MG chemo tablet Take 2 tablets by mouth with 1 other imatinib prescription for 600 mg total Daily. Take with food and large glass of water; Do not take with Grapefruit juice. 60 tablet 2    • imatinib (GLEEVEC) 400 MG chemo tablet Take 1 tablet by mouth with 1 other imatinib prescription for 600 mg total Daily. Take with food and large glass of water; Do not take with Grapefruit juice. 30 tablet 2         Scheduled Meds:  allopurinol, 100 mg, Oral, Q8H  aspirin, 325 mg, Oral,  "Daily  budesonide-formoterol, 2 puff, Inhalation, BID - RT  carvedilol, 6.25 mg, Oral, BID With Meals  citalopram, 10 mg, Oral, Daily  enoxaparin, 40 mg, Subcutaneous, Q24H  fentaNYL, 1 patch, Transdermal, Q72H  folic acid, 1 mg, Oral, Daily  furosemide, 20 mg, Intravenous, BID  guaiFENesin, 600 mg, Oral, Q12H  imatinib, 400 mg, Oral, Daily  insulin glargine, 30 Units, Subcutaneous, Daily  insulin lispro, 10 Units, Subcutaneous, TID AC  insulin lispro, 2-9 Units, Subcutaneous, TID With Meals  mirtazapine, 30 mg, Oral, Nightly  sodium chloride, 250 mL, Intravenous, Once  sodium chloride, 10 mL, Intravenous, Q12H         Continuous Infusions:       PRN Meds:  •  acetaminophen **OR** acetaminophen **OR** acetaminophen  •  ALPRAZolam  •  aluminum-magnesium hydroxide-simethicone  •  dextrose  •  dextrose  •  glucagon (human recombinant)  •  HYDROcodone-acetaminophen  •  influenza vaccine  •  melatonin  •  nitroglycerin  •  ondansetron **OR** ondansetron  •  potassium chloride **OR** potassium chloride **OR** potassium chloride  •  sodium chloride  •  sodium chloride      Allergies:  Hydromorphone, Morphine, and Propofol      Objective     Vital Signs   /73 (BP Location: Left arm, Patient Position: Lying)   Pulse 94   Temp 98.3 °F (36.8 °C) (Oral)   Resp 16   Ht 162.6 cm (64\")   Wt 68 kg (150 lb)   LMP 05/25/2022 (Approximate)   SpO2 95%   BMI 25.75 kg/m²     Physical Exam:     General Appearance:    In NAD       Mental Status Exam:    Hygiene:   good  Cooperation:  Cooperative  Eye Contact:  Good  Psychomotor Behavior:  Appropriate  Affect:  Blunted  Mood: depressed   Speech:  Normal  Thought Progress:  Goal directed and Linear  Thought Content:  Mood congruent  Suicidal:  None  Homicidal:  None  Hallucinations:  None  Delusion:  None  Memory:  Intact  Orientation:  Person, Place, Time and Situation  Reliability:  good  Insight:  Good  Judgement:  Good  Impulse Control:  Fair  Physical/Medical Issues:  Yes  "     Medications and allergies reviewed     Lab Results   Component Value Date    GLUCOSE 199 (H) 11/06/2022    CALCIUM 7.9 (L) 11/06/2022     (L) 11/06/2022    K 4.6 11/06/2022    CO2 27.0 11/06/2022    CL 94 (L) 11/06/2022    BUN 30 (H) 11/06/2022    CREATININE 0.90 11/06/2022    EGFRIFNONA 133 02/02/2022    BCR 33.3 (H) 11/06/2022    ANIONGAP 12.0 11/06/2022       Last Urine Toxicity    There is no flowsheet data to display.         No results found for: PHENYTOIN, PHENOBARB, VALPROATE, CBMZ    Lab Results   Component Value Date     (L) 11/06/2022    BUN 30 (H) 11/06/2022    CREATININE 0.90 11/06/2022    TSH 1.070 01/13/2022    .10 (C) 11/06/2022       Brief Urine Lab Results  (Last result in the past 365 days)      Color   Clarity   Blood   Leuk Est   Nitrite   Protein   CREAT   Urine HCG        07/05/22 1620 Yellow   Clear   Large (3+)   Trace   Negative   Negative                 Assessment & Plan       Chest pain, unspecified type    CML (chronic myelocytic leukemia) (HCC)    Depression with anxiety    History of pulmonary embolism    Medically noncompliant    Type 2 diabetes mellitus with diabetic polyneuropathy, with long-term current use of insulin (HCC)    Acute diastolic CHF (congestive heart failure) ([facility])    NICM (nonischemic cardiomyopathy) ([facility])        Assessment: Mood d/o 2ry to medical condition (CML)   Treatment Plan:  the pt is depressed, but denied any intention to kill herself   Cont celexa 10 mg    Cont  remeron  30 mg po QHS and trazodone 50 mg po QHS   Cont to provide support  The pt will benefit from counseling after d/c   Will follow PRN   Treatment Plan discussed with: Patient and nursing     I discussed the patients findings and my recommendations with patient and nursing staff    I have reviewed and approved the behavioral health treatment plans and problem list. Yes     Referring MD has access to consult report and progress notes in EMR     Tish Gonzales,  MD  11/06/22  11:56 EST

## 2022-11-06 NOTE — PROGRESS NOTES
[facility] Medicine Services Daily Progress Note    Patient Name: Nieves Mc  : 1975  MRN: [id number]  Primary Care Physician:  Alida Latham APRN  Date of admission: 11/3/2022      Subjective          Brief interim history: 47-year-old female with nonischemic cardiomyopathy, HFpEF, PE, anxiety, T2DM, history of medical noncompliance, depression/anxiety and CML. She was admitted on 11/3/2022, presented to [facility] ED, complaining of chest pain that started at around 9 AM while she was at home with her .  Chest pain is described as dull in nature, radiating to the left with paresthesia.  Cardiac biomarkers with troponin negative x3 and EKG shows sinus tachycardia.  Laboratories notable for proBNP of 95880 and chest x-ray showed cardiomegaly. Patient is admitted with atypical chest pain chest pain, rule out for ACS.  Seen in consultation by cardiologist with recommendation and followed by oncologist for CML.     2022: Seen and examined in follow evaluation.  Chest pain-free and resting comfortably in bed.     2022: Seen and examined in follow-up.  Feels and looks better this morning.  No complaints or event.    2022: Seen and examined in follow-up.  Feels slightly better but still dyspneic with activity.  Underwent NST today with result pending          Objective      Vitals:   Temp:  [98.1 °F (36.7 °C)-98.7 °F (37.1 °C)] 98.3 °F (36.8 °C)  Heart Rate:  [] 94  Resp:  [16-18] 16  BP: (116-142)/(73-94) 116/73  Flow (L/min):  [2] 2    Physical Exam   Constitutional:       General: She is not in acute distress.     Appearance: Normal appearance. She is obese. She is not ill-appearing.   HENT:      Head: Normocephalic.      Nose: Nose normal.      Mouth/Throat:      Mouth: Mucous membranes are moist.   Eyes:      Pupils: Pupils are equal, round, and reactive to light.   Cardiovascular:      Rate and Rhythm: Normal rate.      Pulses: Normal pulses.    Pulmonary:      Effort: Pulmonary effort is normal.   Abdominal:      Palpations: Abdomen is soft.  +Splenomegaly, with distended abdomen  Musculoskeletal:         General: No swelling or tenderness. Normal range of motion.      Cervical back: Neck supple.   Skin:     General: Skin is warm.   Neurological:      General: No focal deficit present.      Mental Status: She is alert.   Psychiatric:         Mood and Affect: Mood normal.                  Result Review    Result Review:  I have personally reviewed the results from the time of this admission to 11/6/2022 12:21 EST and agree with these findings:  []  Laboratory  []  Microbiology  []  Radiology  []  EKG/Telemetry   []  Cardiology/Vascular   []  Pathology  []  Old records  []  Other:  Most notable findings include:       Assessment & Plan        allopurinol, 100 mg, Oral, Q8H  aspirin, 325 mg, Oral, Daily  budesonide-formoterol, 2 puff, Inhalation, BID - RT  carvedilol, 6.25 mg, Oral, BID With Meals  citalopram, 10 mg, Oral, Daily  enoxaparin, 40 mg, Subcutaneous, Q24H  fentaNYL, 1 patch, Transdermal, Q72H  folic acid, 1 mg, Oral, Daily  furosemide, 20 mg, Intravenous, BID  guaiFENesin, 600 mg, Oral, Q12H  imatinib, 400 mg, Oral, Daily  insulin glargine, 30 Units, Subcutaneous, Daily  insulin lispro, 10 Units, Subcutaneous, TID AC  insulin lispro, 2-9 Units, Subcutaneous, TID With Meals  mirtazapine, 30 mg, Oral, Nightly  sodium chloride, 250 mL, Intravenous, Once  sodium chloride, 10 mL, Intravenous, Q12H             Active Hospital Problems:  Active Hospital Problems    Diagnosis    • **Chest pain, unspecified type    • Acute diastolic CHF (congestive heart failure) ([facility])    • NICM (nonischemic cardiomyopathy) ([facility])    • Type 2 diabetes mellitus with diabetic polyneuropathy, with long-term current use of insulin ([facility])    • Depression with anxiety    • Medically noncompliant    • History of pulmonary embolism    • CML (chronic myelocytic leukemia) ([facility])       Assessment/plan:     Chest pain (atypical)      -resolved, aspirin/losartan /metoprolol at home       -ruled out for ACS        -S/p NST (11/6)     Nonischemic cardiomyopathy        -Lasix/Coreg      Acute on chronic HFrEF(2D echo on 6/22, with estimated EF of 45%)        -elevated BNP 29,000         -Lasix     History of pulmonary embolus       -stable     DM       -Lantus/SSI     CML      -Gleevec/hydroxyurea     Anxiety/depression       -Remeron/Celexa    Generalized weakness      -multifactorial, PT/OT as tolerated      Medical noncompliance      -continue to encourage adherence and compliance to treatment      DVT prophylaxis:  Medical DVT prophylaxis orders are present.    CODE STATUS:    Level Of Support Discussed With: Patient  Code Status (Patient has no pulse and is not breathing): CPR (Attempt to Resuscitate)  Medical Interventions (Patient has pulse or is breathing): Full Support      Disposition:  I expect patient to be discharged     Electronically signed by Refugio Rod MD, 11/06/22, 12:21 EST.  [facility] Hospitalist [facility]

## 2022-11-06 NOTE — NURSING NOTE
"Pt has two black spots on inside of cheek. No c/o pain but states \"it is bothersome.\" States this is something new, will pass on to day shift nurse to mention during rounds.   "

## 2022-11-06 NOTE — PROGRESS NOTES
Referring Provider: Hospitalist    Reason for follow-up: Chest pain     Patient Care Team:  Alida Latham APRN as PCP - General (Nurse Practitioner)  Meghann Lerma MD as Consulting Physician (Hematology and Oncology)    Subjective .  Patient seen and examined.  Chart reviewed.  Labs reviewed.  Discussed with RN taking care of patient..Patient does not have any chest pain today    Objective  Lying in bed comfortably     Review of Systems   Constitutional: Negative for malaise/fatigue.   Cardiovascular: Negative for chest pain, dyspnea on exertion, leg swelling and palpitations.   Respiratory: Negative for cough and shortness of breath.    Gastrointestinal: Negative for abdominal pain, nausea and vomiting.   Neurological: Negative for dizziness, focal weakness, headaches, light-headedness and numbness.   All other systems reviewed and are negative.      Hydromorphone, Morphine, and Propofol    Scheduled Meds:allopurinol, 100 mg, Oral, Q8H  aspirin, 325 mg, Oral, Daily  budesonide-formoterol, 2 puff, Inhalation, BID - RT  carvedilol, 6.25 mg, Oral, BID With Meals  citalopram, 10 mg, Oral, Daily  enoxaparin, 40 mg, Subcutaneous, Q24H  fentaNYL, 1 patch, Transdermal, Q72H  First Mouthwash (Magic Mouthwash), 5 mL, Swish & Spit, Q4H  folic acid, 1 mg, Oral, Daily  furosemide, 20 mg, Intravenous, BID  guaiFENesin, 600 mg, Oral, Q12H  imatinib, 400 mg, Oral, Daily  insulin glargine, 30 Units, Subcutaneous, Daily  insulin lispro, 10 Units, Subcutaneous, TID AC  insulin lispro, 2-9 Units, Subcutaneous, TID With Meals  mirtazapine, 30 mg, Oral, Nightly  sodium chloride, 250 mL, Intravenous, Once  sodium chloride, 10 mL, Intravenous, Q12H      Continuous Infusions:   PRN Meds:.•  acetaminophen **OR** acetaminophen **OR** acetaminophen  •  ALPRAZolam  •  aluminum-magnesium hydroxide-simethicone  •  dextrose  •  dextrose  •  glucagon (human recombinant)  •  HYDROcodone-acetaminophen  •  influenza vaccine  •   "melatonin  •  nitroglycerin  •  ondansetron **OR** ondansetron  •  potassium chloride **OR** potassium chloride **OR** potassium chloride  •  sodium chloride  •  sodium chloride        VITAL SIGNS  Vitals:    11/05/22 2054 11/06/22 0430 11/06/22 1001 11/06/22 1330   BP: 117/74 116/73 122/70 112/70   BP Location: Left arm Left arm Left arm Left arm   Patient Position: Sitting Lying Sitting Sitting   Pulse: 102 94 64 104   Resp: 18 16 16 16   Temp: 98.7 °F (37.1 °C) 98.3 °F (36.8 °C) 98.6 °F (37 °C) 97.7 °F (36.5 °C)   TempSrc: Oral Oral Oral Oral   SpO2: 96% 95% 96%    Weight:       Height:           Flowsheet Rows    Flowsheet Row First Filed Value   Admission Height 162.6 cm (64\") Documented at 11/03/2022 0930   Admission Weight 68 kg (150 lb) Documented at 11/03/2022 0930           TELEMETRY: Sinus rhythm    Physical Exam:  Constitutional:       Appearance: Well-developed.   Eyes:      General: No scleral icterus.     Conjunctiva/sclera: Conjunctivae normal.   HENT:      Head: Normocephalic and atraumatic.   Neck:      Vascular: No carotid bruit or JVD.   Pulmonary:      Effort: Pulmonary effort is normal.      Breath sounds: Normal breath sounds. No wheezing. No rales.   Cardiovascular:      Normal rate. Regular rhythm.   Pulses:     Intact distal pulses.   Abdominal:      General: Bowel sounds are normal.      Palpations: Abdomen is soft.   Musculoskeletal:      Cervical back: Normal range of motion and neck supple. Skin:     General: Skin is warm and dry.      Findings: No rash.   Neurological:      Mental Status: Alert.          Results Review:   I reviewed the patient's new clinical results.  Lab Results (last 24 hours)     Procedure Component Value Units Date/Time    POC Glucose Once [[id number]]  (Abnormal) Collected: 11/06/22 1203    Specimen: Blood Updated: 11/06/22 1204     Glucose 156 mg/dL      Comment: Serial Number: [id number]:  [id number]       Blood Culture - Blood, Arm, Left [[id number]]  " (Normal) Collected: 11/05/22 0901    Specimen: Blood from Arm, Left Updated: 11/06/22 0815     Blood Culture No growth at 24 hours    POC Glucose Once [[id number]]  (Abnormal) Collected: 11/06/22 0720    Specimen: Blood Updated: 11/06/22 0722     Glucose 194 mg/dL      Comment: Serial Number: [id number]:  [id number]       Manual Differential [[id number]]  (Abnormal) Collected: 11/06/22 0045    Specimen: Blood Updated: 11/06/22 0706     Neutrophil % 13.0 %      Lymphocyte % 3.0 %      Eosinophil % 16.0 %      Basophil % 18.0 %      Metamyelocyte % 5.0 %      Promyelocyte % 15.0 %      Blasts % 30.0 %      Neutrophils Absolute 31.21 10*3/mm3      Lymphocytes Absolute 7.20 10*3/mm3      Eosinophils Absolute 38.42 10*3/mm3      Basophils Absolute 43.22 10*3/mm3      Anisocytosis Slight/1+     Microcytes Slight/1+     WBC Morphology Normal     Giant Platelets Slight/1+    Narrative:      Reviewed by Pathologist within the past 30 days on 11.01.2022.      CBC & Differential [[id number]]  (Abnormal) Collected: 11/06/22 0045    Specimen: Blood Updated: 11/06/22 0706    Narrative:      The following orders were created for panel order CBC & Differential.  Procedure                               Abnormality         Status                     ---------                               -----------         ------                     CBC Auto Differential[[id number]]        Abnormal            Final result               Scan Slide[[id number]]                                       Final result                 Please view results for these tests on the individual orders.    Scan Slide [[id number]] Collected: 11/06/22 0045    Specimen: Blood Updated: 11/06/22 0706     Scan Slide --     Comment: See Manual Differential Results       CBC Auto Differential [[id number]]  (Abnormal) Collected: 11/06/22 0045    Specimen: Blood Updated: 11/06/22 0706     .10 10*3/mm3      RBC 3.51 10*6/mm3      Hemoglobin 8.6 g/dL      Hematocrit 27.2 %       MCV 77.5 fL      MCH 24.5 pg      MCHC 31.6 g/dL      RDW 17.8 %      RDW-SD 49.9 fl      MPV 8.2 fL      Platelets 449 10*3/mm3     Narrative:      Modified report. Previous result was Hemogram on 11/6/2022 at 0148 EDT.  The previously reported component NRBC is no longer being reported. Previous result was 0.1 /100 WBC (Reference Range: 0.0-0.2 /100 WBC) on 11/6/2022 at 0148 EDT.    Basic Metabolic Panel [[id number]]  (Abnormal) Collected: 11/06/22 0045    Specimen: Blood Updated: 11/06/22 0142     Glucose 199 mg/dL      BUN 30 mg/dL      Creatinine 0.90 mg/dL      Sodium 133 mmol/L      Potassium 4.6 mmol/L      Chloride 94 mmol/L      CO2 27.0 mmol/L      Calcium 7.9 mg/dL      BUN/Creatinine Ratio 33.3     Anion Gap 12.0 mmol/L      eGFR 79.5 mL/min/1.73      Comment: National Kidney Foundation and American Society of Nephrology (ASN) Task Force recommended calculation based on the Chronic Kidney Disease Epidemiology Collaboration (CKD-EPI) equation refit without adjustment for race.       Narrative:      GFR Normal >60  Chronic Kidney Disease <60  Kidney Failure <15      POC Glucose Once [[id number]]  (Abnormal) Collected: 11/05/22 1707    Specimen: Blood Updated: 11/05/22 1708     Glucose 229 mg/dL      Comment: Serial Number: [id number]:  [id number]             Imaging Results (Last 24 Hours)     ** No results found for the last 24 hours. **          EKG      I personally viewed and interpreted the patient's EKG/Telemetry data:    ECHOCARDIOGRAM:    STRESS MYOVIEW:    CARDIAC CATHETERIZATION:    OTHER:         Assessment & Plan     Principal Problem:    Chest pain, unspecified type  Active Problems:    CML (chronic myelocytic leukemia) ([facility])    Depression with anxiety    History of pulmonary embolism    Medically noncompliant    Type 2 diabetes mellitus with diabetic polyneuropathy, with long-term current use of insulin ([facility])    Acute diastolic CHF (congestive heart failure) ([facility])    NICM  (nonischemic cardiomyopathy) (H[facility])       Patient presents with chest pain and is ruled out for MI by get enzymes  Patient has mild congestive heart failure in the past and will be started on low-dose beta-blockers  Patient underwent stress test which was negative for ischemia.  Patient sugar levels and lipid levels are followed by primary doctor  Patient has history of pulmonary embolism in the past and is followed by the primary care doctor.  Increase activity as tolerated.  Please call me back if I can be of any further assistance.  Reviewed results with patient and RN taking care of patient to coordinate care.    Ruiz Alicea MD  11/06/22  16:01 EST

## 2022-11-06 NOTE — PLAN OF CARE
Goal Outcome Evaluation:  Plan of Care Reviewed With: patient        Progress: improving   Pt resting comfortably throughout night. No complaints at this time. On 2L O2, call light in reach. Will continue to monitor..

## 2022-11-06 NOTE — PROGRESS NOTES
Hematology/Oncology Inpatient Progress Note    PATIENT NAME: Nieves Mc  : 1975  MRN: [id number]    CHIEF COMPLAINT: Chest pain    HISTORY OF PRESENT ILLNESS:    Nieves Mc is a 47 y.o. female who presented to [facility] on 11/3/2022 with complaints of chest pain.  Past medical history significant for CML, depression with anxiety, PE, type 2 diabetes mellitus.  Patient reported that around 9 AM the day of presentation she began to have chest pain.  Stated it was accompanied by the loss of hearing in her left ear and left arm numbness.  She reported she has never experienced this previous.  Since being in the emergency department the chest pain has subsided.  She describes the chest pain as dull.  She did not take anything at home to try to alleviate it.  She reported chronic leg swelling but denied any recent weight gain.  She reported shortness of breath.  She reported chronic cough.  She denied any fever or chills.  She also complained of nausea, vomiting and diarrhea. She was most recently seen in the emergency department on 10/31/2022 for abdominal pain at the instruction of her oncologist.  A CT of the abdomen and pelvis was fairly unchanged from prior and showed severe generalized anasarca and severe hepatosplenomegaly.  A CT of the chest was performed to rule out pulmonary embolism which was also negative and therefore the patient was discharged home.     Evaluation in the ED showed a negative troponin less than 0.010, elevated proBNP at 29,519, glucose 411, normal kidney function, elevated alk phos 1826.  WBC elevated at 376.70, hemoglobin 7.8, MCV 82.8, platelets 404,000, ANC 48.97, blasts 66%.  EKG showed sinus tachycardia, chest x-ray showed cardiomegaly and mild pulmonary vascular congestion which is increased in comparison.  The patient received Lasix, regular insulin, Nitrostat and a normal saline 250 mL bolus in the ED.  She was admitted to the hospital for further  evaluation and treatment.     11/03/22  Hematology/Oncology was consulted on a patient known to us and followed in the office by Dr. Lerma for her diagnosis of CML currently on imatinib and hydroxyurea.  Patient was initially seen in consultation in October 2018 while she was inpatient at [facility] with CML.  At that time she was under the care of Dr. Roach and  and was managed on imatinib.  Patient had a repeat bone marrow aspiration and biopsy in December 2018 that was consistent with chronic myeloid leukemia.  BCR ABL positive, chronic phase.  ABL kinase mutational analysis negative.  Patient was initiated on Tasigna in February 2019 but this was discontinued in June 2022 due to diagnosis of cardiomyopathy.  At that time it was determined that the imatinib would be the safer treatment option and she was reinitiated on this.  Her course has been complicated due to noncompliance and difficulty tolerating TKI's.    Flow Cytometry 11/3/22 - increased neutrophilic cells, increased atypical myeloid blasts (13% of leukocytes), aberrant CD56 expression on granulocytes and monocytes, increased basophils (7% of leukocytes)     11/4/22: Imatinib 400 mg daily restarted     11/5/22: Continues Hydrea 2500 mg daily, Imatinib 400 mg daily, allopurinol 100 mg daily. Patient denies chest pain or SOB. Discussed with patient that WBC is 284.9 today. Patient relays that she started her home supply of imatinib yesterday at 400 mg.      She  has a past medical history of Bone pain, COVID-19 virus infection (01/12/2022), Diabetes mellitus (HCC), Extremity pain, Leg pain, Migraine, Pulmonary embolism (HCC), and Vision loss.     PCP: Alida Latahm APRN    History of present illness was reviewed and is unchanged from the previous visit. 11/05/22      Subjective    Still feeling poorly. Has developed ulcerations on the oral mucosa that she believes are the result of the imatinib that she started to take yesterday. Underwent stress  test without any problems.     No changes overnight    ROS:    Review of Systems   Constitutional: Positive for fatigue. Negative for activity change, appetite change, chills, diaphoresis, fever and unexpected weight change.   HENT: Positive for mouth sores. Negative for congestion, dental problem, drooling, ear discharge, ear pain, facial swelling, hearing loss, nosebleeds, postnasal drip, rhinorrhea, sinus pressure, sinus pain, sneezing, sore throat, tinnitus, trouble swallowing and voice change.    Eyes: Negative for photophobia, pain, discharge, redness, itching and visual disturbance.   Respiratory: Positive for cough and shortness of breath. Negative for apnea, choking, chest tightness, wheezing and stridor.    Cardiovascular: Positive for leg swelling. Negative for chest pain and palpitations.   Gastrointestinal: Negative for abdominal distention, abdominal pain, anal bleeding, blood in stool, constipation, diarrhea, nausea, rectal pain and vomiting.   Endocrine: Negative for cold intolerance, heat intolerance, polydipsia, polyphagia and polyuria.   Genitourinary: Negative for decreased urine volume, difficulty urinating, dysuria, flank pain, frequency, genital sores, hematuria and urgency.   Musculoskeletal: Negative for arthralgias, back pain, gait problem, joint swelling, myalgias, neck pain and neck stiffness.   Skin: Negative for color change, pallor, rash and wound.   Neurological: Positive for weakness. Negative for dizziness, tremors, seizures, syncope, facial asymmetry, speech difficulty, light-headedness, numbness and headaches.   Hematological: Negative for adenopathy. Does not bruise/bleed easily.   Psychiatric/Behavioral: Negative for agitation, behavioral problems, confusion, decreased concentration, hallucinations, self-injury, sleep disturbance and suicidal ideas. The patient is not nervous/anxious and is not hyperactive.         MEDICATIONS:    Scheduled Meds:  allopurinol, 100 mg, Oral,  "Q8H  aspirin, 325 mg, Oral, Daily  budesonide-formoterol, 2 puff, Inhalation, BID - RT  carvedilol, 6.25 mg, Oral, BID With Meals  citalopram, 10 mg, Oral, Daily  enoxaparin, 40 mg, Subcutaneous, Q24H  fentaNYL, 1 patch, Transdermal, Q72H  folic acid, 1 mg, Oral, Daily  furosemide, 20 mg, Intravenous, BID  guaiFENesin, 600 mg, Oral, Q12H  imatinib, 400 mg, Oral, Daily  insulin glargine, 30 Units, Subcutaneous, Daily  insulin lispro, 10 Units, Subcutaneous, TID AC  insulin lispro, 2-9 Units, Subcutaneous, TID With Meals  mirtazapine, 30 mg, Oral, Nightly  sodium chloride, 250 mL, Intravenous, Once  sodium chloride, 10 mL, Intravenous, Q12H       Continuous Infusions:      PRN Meds:  •  acetaminophen **OR** acetaminophen **OR** acetaminophen  •  ALPRAZolam  •  aluminum-magnesium hydroxide-simethicone  •  dextrose  •  dextrose  •  glucagon (human recombinant)  •  HYDROcodone-acetaminophen  •  influenza vaccine  •  melatonin  •  nitroglycerin  •  ondansetron **OR** ondansetron  •  potassium chloride **OR** potassium chloride **OR** potassium chloride  •  sodium chloride  •  sodium chloride     ALLERGIES:    Allergies   Allergen Reactions   • Hydromorphone GI Intolerance     dilaudid   • Morphine Nausea And Vomiting   • Propofol Hives     Previously tolerated being premedicated with diphenhydramine and famotadine       Objective    VITALS:   /70 (BP Location: Left arm, Patient Position: Sitting)   Pulse 104   Temp 97.7 °F (36.5 °C) (Oral)   Resp 16   Ht 162.6 cm (64\")   Wt 68 kg (150 lb)   LMP 05/25/2022 (Approximate)   SpO2 96%   BMI 25.75 kg/m²     PHYSICAL EXAM: (performed by MD)  Physical Exam  Vitals and nursing note reviewed.   Constitutional:       General: She is not in acute distress.     Appearance: She is ill-appearing. She is not toxic-appearing or diaphoretic.      Comments: Pale, ill appearing, edematous. Oriented.    HENT:      Head: Normocephalic and atraumatic.      Right Ear: External ear " normal.      Left Ear: External ear normal.      Nose: Nose normal.      Mouth/Throat:      Mouth: Mucous membranes are moist.      Pharynx: Oropharynx is clear. No oropharyngeal exudate or posterior oropharyngeal erythema.      Comments: Two large ulcerations in the mouth, one on the left buccal mucosa and the second on the mucosa of the inferior right lip. Hematic base for both of them (trauma?)  Eyes:      General: No scleral icterus.        Right eye: No discharge.         Left eye: No discharge.      Conjunctiva/sclera: Conjunctivae normal.      Pupils: Pupils are equal, round, and reactive to light.   Neck:      Thyroid: No thyromegaly.   Cardiovascular:      Rate and Rhythm: Normal rate and regular rhythm.      Pulses: Normal pulses.      Heart sounds: Normal heart sounds. No murmur heard.    No friction rub. No gallop.   Pulmonary:      Effort: Pulmonary effort is normal. No respiratory distress.      Breath sounds: No stridor. No wheezing, rhonchi or rales.   Abdominal:      General: Bowel sounds are normal. There is no distension.      Palpations: Abdomen is soft. There is no mass.      Tenderness: There is no abdominal tenderness. There is no right CVA tenderness, left CVA tenderness, guarding or rebound.      Hernia: No hernia is present.      Comments: Rounded, protuberant and soft. The spleen seems enlarged.    Musculoskeletal:         General: No swelling, tenderness, deformity or signs of injury. Normal range of motion.      Cervical back: Normal range of motion and neck supple. No rigidity.      Right lower leg: No edema.      Left lower leg: No edema.      Comments: Diffuse peripheral edema.    Lymphadenopathy:      Cervical: No cervical adenopathy.   Skin:     General: Skin is warm.      Coloration: Skin is not jaundiced.      Findings: No bruising, erythema, lesion or rash.   Neurological:      General: No focal deficit present.      Mental Status: She is alert and oriented to person, place, and  time.      Cranial Nerves: No cranial nerve deficit.      Motor: No weakness or abnormal muscle tone.      Gait: Gait normal.   Psychiatric:         Mood and Affect: Mood normal.         Behavior: Behavior normal.         Thought Content: Thought content normal.         Judgment: Judgment normal.     JAMEL Truong MD performed the physical exam on 11/6/2022 as documented above.       RECENT LABS:  Lab Results (last 24 hours)     Procedure Component Value Units Date/Time    POC Glucose Once [[id number]]  (Abnormal) Collected: 11/06/22 1203    Specimen: Blood Updated: 11/06/22 1204     Glucose 156 mg/dL      Comment: Serial Number: [id number]:  [id number]       Blood Culture - Blood, Arm, Left [[id number]]  (Normal) Collected: 11/05/22 0901    Specimen: Blood from Arm, Left Updated: 11/06/22 0815     Blood Culture No growth at 24 hours    POC Glucose Once [[id number]]  (Abnormal) Collected: 11/06/22 0720    Specimen: Blood Updated: 11/06/22 0722     Glucose 194 mg/dL      Comment: Serial Number: [id number]:  [id number]       Manual Differential [[id number]]  (Abnormal) Collected: 11/06/22 0045    Specimen: Blood Updated: 11/06/22 0706     Neutrophil % 13.0 %      Lymphocyte % 3.0 %      Eosinophil % 16.0 %      Basophil % 18.0 %      Metamyelocyte % 5.0 %      Promyelocyte % 15.0 %      Blasts % 30.0 %      Neutrophils Absolute 31.21 10*3/mm3      Lymphocytes Absolute 7.20 10*3/mm3      Eosinophils Absolute 38.42 10*3/mm3      Basophils Absolute 43.22 10*3/mm3      Anisocytosis Slight/1+     Microcytes Slight/1+     WBC Morphology Normal     Giant Platelets Slight/1+    Narrative:      Reviewed by Pathologist within the past 30 days on 11.01.2022.      CBC & Differential [[id number]]  (Abnormal) Collected: 11/06/22 0045    Specimen: Blood Updated: 11/06/22 0706    Narrative:      The following orders were created for panel order CBC & Differential.  Procedure                               Abnormality          Status                     ---------                               -----------         ------                     CBC Auto Differential[[id number]]        Abnormal            Final result               Scan Slide[[id number]]                                       Final result                 Please view results for these tests on the individual orders.    Scan Slide [[id number]] Collected: 11/06/22 0045    Specimen: Blood Updated: 11/06/22 0706     Scan Slide --     Comment: See Manual Differential Results       CBC Auto Differential [[id number]]  (Abnormal) Collected: 11/06/22 0045    Specimen: Blood Updated: 11/06/22 0706     .10 10*3/mm3      RBC 3.51 10*6/mm3      Hemoglobin 8.6 g/dL      Hematocrit 27.2 %      MCV 77.5 fL      MCH 24.5 pg      MCHC 31.6 g/dL      RDW 17.8 %      RDW-SD 49.9 fl      MPV 8.2 fL      Platelets 449 10*3/mm3     Narrative:      Modified report. Previous result was Hemogram on 11/6/2022 at 0148 EDT.  The previously reported component NRBC is no longer being reported. Previous result was 0.1 /100 WBC (Reference Range: 0.0-0.2 /100 WBC) on 11/6/2022 at 0148 EDT.    Basic Metabolic Panel [[id number]]  (Abnormal) Collected: 11/06/22 0045    Specimen: Blood Updated: 11/06/22 0142     Glucose 199 mg/dL      BUN 30 mg/dL      Creatinine 0.90 mg/dL      Sodium 133 mmol/L      Potassium 4.6 mmol/L      Chloride 94 mmol/L      CO2 27.0 mmol/L      Calcium 7.9 mg/dL      BUN/Creatinine Ratio 33.3     Anion Gap 12.0 mmol/L      eGFR 79.5 mL/min/1.73      Comment: National Kidney Foundation and American Society of Nephrology (ASN) Task Force recommended calculation based on the Chronic Kidney Disease Epidemiology Collaboration (CKD-EPI) equation refit without adjustment for race.       Narrative:      GFR Normal >60  Chronic Kidney Disease <60  Kidney Failure <15      POC Glucose Once [[id number]]  (Abnormal) Collected: 11/05/22 1707    Specimen: Blood Updated: 11/05/22 1708     Glucose 229  mg/dL      Comment: Serial Number: [id number]:  [id number]             PENDING RESULTS: Flow cytometry    IMAGING REVIEWED:  No radiology results for the last day    Assessment & Plan      ASSESSMENT:  Hematology/Oncology was consulted on a patient known to us and followed in the office by Dr. Lerma for her diagnosis of CML currently on imatinib and hydroxyurea.  Patient has been very noncompliant with treatments.  She is currently on Gleevec 400 mg daily but this was recently increased to 600 mg daily.  She is also supposed to be on hydroxyurea twice a day.  She has been noncompliant with medication intake and also routine follow-up in the office for ongoing care.  Patient has had progressive shortness of breath and swelling on her lower extremities.      1. Chronic myelogenous leukemia: On imatinib for the last 24 hours. Had been on hydroxyurea. She has had some cytoreduction. Her uric acid is again elevated. Hydration not a good idea at this time given her cardiac disease. Will treat with allopurinol at a higher dose. Renal function preserved. Will continue to follow closely.   2. The stress test reportedly revealed no evidence of coronary artery disease.   3. Discussed with her and her mother. Will provide Micaela's magic potion     PLAN:  1.   As above.     Yifan Truong MD on 11/6/2022 at 14:56

## 2022-11-06 NOTE — PLAN OF CARE
Goal Outcome Evaluation:  Plan of Care Reviewed With: patient        Progress: no change  Outcome Evaluation: Pt resting this shift. Pt c/o pain medication given and effective. Pt continues with oxygen as needed. Pt reported sores to her mouth, oncology notified and discontinue one of pt's chemo drugs and contributed the sores to the medication and part of the disease process. Will cont to monitor and document as needed.

## 2022-11-06 NOTE — PLAN OF CARE
Goal Outcome Evaluation:  Plan of Care Reviewed With: patient        Progress: no change  Outcome Evaluation: Pt resting this shfit. Pt c/o pain throughout the day, prn medication given and effective. Pt has been using oxygen on and off throughout the day. Pt was able to ambulate to restroom with no assistance. Pt denies any other complaints. Will cont to monitor and document as needed.

## 2022-11-07 ENCOUNTER — SPECIALTY PHARMACY (OUTPATIENT)
Dept: PHARMACY | Facility: HOSPITAL | Age: 47
End: 2022-11-07

## 2022-11-07 LAB
GLUCOSE BLDC GLUCOMTR-MCNC: 201 MG/DL (ref 70–105)
GLUCOSE BLDC GLUCOMTR-MCNC: 269 MG/DL (ref 70–105)
GLUCOSE BLDC GLUCOMTR-MCNC: 281 MG/DL (ref 70–105)

## 2022-11-07 PROCEDURE — 94799 UNLISTED PULMONARY SVC/PX: CPT

## 2022-11-07 PROCEDURE — 82962 GLUCOSE BLOOD TEST: CPT

## 2022-11-07 PROCEDURE — 25010000002 FUROSEMIDE PER 20 MG: Performed by: INTERNAL MEDICINE

## 2022-11-07 PROCEDURE — 63710000001 INSULIN LISPRO (HUMAN) PER 5 UNITS: Performed by: NURSE PRACTITIONER

## 2022-11-07 PROCEDURE — 99232 SBSQ HOSP IP/OBS MODERATE 35: CPT | Performed by: INTERNAL MEDICINE

## 2022-11-07 PROCEDURE — 63710000001 INSULIN GLARGINE PER 5 UNITS: Performed by: NURSE PRACTITIONER

## 2022-11-07 PROCEDURE — 25010000002 ENOXAPARIN PER 10 MG: Performed by: NURSE PRACTITIONER

## 2022-11-07 PROCEDURE — 25010000002 FUROSEMIDE PER 20 MG: Performed by: NURSE PRACTITIONER

## 2022-11-07 RX ORDER — ALLOPURINOL 100 MG/1
100 TABLET ORAL 2 TIMES DAILY
Qty: 30 TABLET | Refills: 0 | Status: SHIPPED | OUTPATIENT
Start: 2022-11-07 | End: 2023-02-20

## 2022-11-07 RX ORDER — ASPIRIN 81 MG/1
81 TABLET ORAL DAILY
Qty: 30 TABLET | Refills: 0 | Status: SHIPPED | OUTPATIENT
Start: 2022-11-07 | End: 2022-12-07

## 2022-11-07 RX ORDER — CARVEDILOL 6.25 MG/1
6.25 TABLET ORAL 2 TIMES DAILY WITH MEALS
Qty: 60 TABLET | Refills: 0 | Status: SHIPPED | OUTPATIENT
Start: 2022-11-07 | End: 2023-03-10

## 2022-11-07 RX ORDER — FUROSEMIDE 10 MG/ML
40 INJECTION INTRAMUSCULAR; INTRAVENOUS
Status: DISCONTINUED | OUTPATIENT
Start: 2022-11-07 | End: 2022-11-09

## 2022-11-07 RX ORDER — HYDROXYUREA 500 MG/1
500 CAPSULE ORAL 2 TIMES DAILY
Qty: 120 CAPSULE | Refills: 0 | Status: SHIPPED | OUTPATIENT
Start: 2022-11-07 | End: 2023-01-06

## 2022-11-07 RX ADMIN — CARVEDILOL 6.25 MG: 6.25 TABLET, FILM COATED ORAL at 08:37

## 2022-11-07 RX ADMIN — GUAIFENESIN 600 MG: 600 TABLET, EXTENDED RELEASE ORAL at 08:36

## 2022-11-07 RX ADMIN — FUROSEMIDE 40 MG: 10 INJECTION, SOLUTION INTRAMUSCULAR; INTRAVENOUS at 16:23

## 2022-11-07 RX ADMIN — INSULIN GLARGINE 30 UNITS: 100 INJECTION, SOLUTION SUBCUTANEOUS at 08:52

## 2022-11-07 RX ADMIN — FOLIC ACID 1 MG: 1 TABLET ORAL at 08:37

## 2022-11-07 RX ADMIN — Medication 10 ML: at 20:44

## 2022-11-07 RX ADMIN — INSULIN LISPRO 10 UNITS: 100 INJECTION, SOLUTION INTRAVENOUS; SUBCUTANEOUS at 12:37

## 2022-11-07 RX ADMIN — INSULIN LISPRO 10 UNITS: 100 INJECTION, SOLUTION INTRAVENOUS; SUBCUTANEOUS at 17:36

## 2022-11-07 RX ADMIN — INSULIN LISPRO 6 UNITS: 100 INJECTION, SOLUTION INTRAVENOUS; SUBCUTANEOUS at 17:36

## 2022-11-07 RX ADMIN — ALPRAZOLAM 0.5 MG: 0.5 TABLET ORAL at 20:28

## 2022-11-07 RX ADMIN — HYDROCODONE BITARTRATE AND ACETAMINOPHEN 1 TABLET: 7.5; 325 TABLET ORAL at 08:58

## 2022-11-07 RX ADMIN — FENTANYL 1 PATCH: 25 PATCH, EXTENDED RELEASE TRANSDERMAL at 08:45

## 2022-11-07 RX ADMIN — BUDESONIDE AND FORMOTEROL FUMARATE DIHYDRATE 2 PUFF: 160; 4.5 AEROSOL RESPIRATORY (INHALATION) at 19:55

## 2022-11-07 RX ADMIN — ALLOPURINOL 100 MG: 100 TABLET ORAL at 20:29

## 2022-11-07 RX ADMIN — Medication 10 ML: at 08:59

## 2022-11-07 RX ADMIN — INSULIN LISPRO 4 UNITS: 100 INJECTION, SOLUTION INTRAVENOUS; SUBCUTANEOUS at 12:37

## 2022-11-07 RX ADMIN — ASPIRIN 325 MG: 325 TABLET, COATED ORAL at 08:37

## 2022-11-07 RX ADMIN — INSULIN LISPRO 10 UNITS: 100 INJECTION, SOLUTION INTRAVENOUS; SUBCUTANEOUS at 08:38

## 2022-11-07 RX ADMIN — IMATINIB 400 MG: 400 TABLET ORAL at 08:51

## 2022-11-07 RX ADMIN — SACUBITRIL AND VALSARTAN 1 TABLET: 24; 26 TABLET, FILM COATED ORAL at 20:28

## 2022-11-07 RX ADMIN — HYDROCODONE BITARTRATE AND ACETAMINOPHEN 1 TABLET: 7.5; 325 TABLET ORAL at 20:44

## 2022-11-07 RX ADMIN — CARVEDILOL 6.25 MG: 6.25 TABLET, FILM COATED ORAL at 16:22

## 2022-11-07 RX ADMIN — MIRTAZAPINE 30 MG: 15 TABLET, FILM COATED ORAL at 20:29

## 2022-11-07 RX ADMIN — INSULIN LISPRO 6 UNITS: 100 INJECTION, SOLUTION INTRAVENOUS; SUBCUTANEOUS at 08:45

## 2022-11-07 RX ADMIN — CITALOPRAM HYDROBROMIDE 10 MG: 20 TABLET ORAL at 08:36

## 2022-11-07 RX ADMIN — GUAIFENESIN 600 MG: 600 TABLET, EXTENDED RELEASE ORAL at 20:29

## 2022-11-07 RX ADMIN — BUDESONIDE AND FORMOTEROL FUMARATE DIHYDRATE 2 PUFF: 160; 4.5 AEROSOL RESPIRATORY (INHALATION) at 07:49

## 2022-11-07 RX ADMIN — ALLOPURINOL 100 MG: 100 TABLET ORAL at 14:18

## 2022-11-07 RX ADMIN — ALLOPURINOL 100 MG: 100 TABLET ORAL at 05:06

## 2022-11-07 RX ADMIN — FUROSEMIDE 20 MG: 10 INJECTION, SOLUTION INTRAMUSCULAR; INTRAVENOUS at 09:00

## 2022-11-07 RX ADMIN — ENOXAPARIN SODIUM 40 MG: 100 INJECTION SUBCUTANEOUS at 16:22

## 2022-11-07 NOTE — CONSULTS
Patient does not qualify for Cardiac Rehab due to no recent MI, coronary intervention, OHS, or EF less than 35%.

## 2022-11-07 NOTE — PROGRESS NOTES
Specialty Pharmacy Note: Gleevec    Patient is to increase dose to 600mg.  Patient is currently on 400mg daily.  The 200mg dose is being delivered today and v/o from Dr. Lerma to increase dose to 600mg during admission.  Patient's mother, Sayra is expecting the delivery of 100mg tablets and will be able to take them to the hospital on 11/8/22.  Once home medication is available, please increase dose to Gleevec 600mg PO QDAY per Dr. Lerma.        Thanks,    Ros CALHOUN, PharmD    
none

## 2022-11-07 NOTE — PROGRESS NOTES
[facility] [place] [facility]   PROGRESS NOTE    Reason for follow-up: chest pain     Patient Care Team:  Alida Latham APRN as PCP - General (Nurse Practitioner)  Meghann Lerma MD as Consulting Physician (Hematology and Oncology)    Subjective    Patient seen and examined.  Chart reviewed.  Labs reviewed.  Discussed with RN taking care of patient.    Patient feeling not as short of breath today. Using oxygen PRN. Lower extremity edema improving. Complaining of pain when sitting up. Reports sores in mouth due to chemo medication. Stress test negative for ischemia.     OBJECTIVE:  11/6/2022: glucose 199, potassium 4.6, Cr 0.90, BUN 30, Ca 7.9, .10  11/7/2022: glucose 281,      Review of Systems   Constitutional: Negative for diaphoresis and fever.   Cardiovascular: Positive for dyspnea on exertion and leg swelling. Negative for chest pain, irregular heartbeat and palpitations.   Respiratory: Positive for shortness of breath. Negative for cough.    Gastrointestinal: Positive for bloating and abdominal pain. Negative for nausea and vomiting.   Neurological: Negative for dizziness and light-headedness.   All other systems reviewed and are negative.      Allergies:  Hydromorphone, Morphine, and Propofol    Scheduled Meds:  allopurinol, 100 mg, Oral, Q8H  aspirin, 325 mg, Oral, Daily  budesonide-formoterol, 2 puff, Inhalation, BID - RT  carvedilol, 6.25 mg, Oral, BID With Meals  citalopram, 10 mg, Oral, Daily  enoxaparin, 40 mg, Subcutaneous, Q24H  fentaNYL, 1 patch, Transdermal, Q72H  First Mouthwash (Magic Mouthwash), 5 mL, Swish & Spit, Q4H  folic acid, 1 mg, Oral, Daily  furosemide, 20 mg, Intravenous, BID  guaiFENesin, 600 mg, Oral, Q12H  imatinib, 400 mg, Oral, Daily  insulin glargine, 30 Units, Subcutaneous, Daily  insulin lispro, 10 Units, Subcutaneous, TID AC  insulin lispro, 2-9 Units, Subcutaneous, TID With Meals  mirtazapine, 30 mg, Oral, Nightly  sodium chloride, 250  "mL, Intravenous, Once  sodium chloride, 10 mL, Intravenous, Q12H      Continuous Infusions:     PRN Meds:  •  acetaminophen **OR** acetaminophen **OR** acetaminophen  •  ALPRAZolam  •  aluminum-magnesium hydroxide-simethicone  •  dextrose  •  dextrose  •  glucagon (human recombinant)  •  HYDROcodone-acetaminophen  •  influenza vaccine  •  melatonin  •  nitroglycerin  •  ondansetron **OR** ondansetron  •  potassium chloride **OR** potassium chloride **OR** potassium chloride  •  sodium chloride  •  sodium chloride    Objective     VITAL SIGNS  Vitals:    11/07/22 0500 11/07/22 0749 11/07/22 0752 11/07/22 0806   BP: 114/70   125/79   BP Location: Left arm   Left arm   Patient Position: Lying   Lying   Pulse: 100 97 97 99   Resp: 16 18 18 18   Temp: 98.2 °F (36.8 °C)   98 °F (36.7 °C)   TempSrc: Oral   Tympanic   SpO2: 92% 98% 98% 97%   Weight:       Height:         Flowsheet Rows    Flowsheet Row First Filed Value   Admission Height 162.6 cm (64\") Documented at 11/03/2022 0930   Admission Weight 68 kg (150 lb) Documented at 11/03/2022 0930           TELEMETRY: sinus tachycardia    Physical Exam:  Vitals reviewed.   Constitutional:       General: Awake.      Appearance: Overweight and not in distress. Chronically ill-appearing.   Eyes:      Conjunctiva/sclera: Conjunctivae normal.      Pupils: Pupils are equal, round, and reactive to light.   Pulmonary:      Effort: Pulmonary effort is normal.      Breath sounds: Normal breath sounds.   Cardiovascular:      Tachycardia present. Regular rhythm. Normal S1. Normal S2.      Murmurs: There is no murmur.   Pulses:     Intact distal pulses.   Edema:     Peripheral edema present.  Abdominal:      General: Bowel sounds are normal. There is distension.   Musculoskeletal:      Cervical back: Normal range of motion and neck supple. Skin:     General: Skin is warm and dry.   Neurological:      General: No focal deficit present.      Mental Status: Alert and oriented to person, " place and time.   Psychiatric:         Behavior: Behavior is cooperative.          LAB RESULTS (LAST 7 DAYS)  I have reviewed new clinical results.    CBC  Results from last 7 days   Lab Units 11/06/22  0045 11/05/22  0149 11/04/22  0305 11/03/22  0957 10/31/22  1323   WBC 10*3/mm3 240.10* 284.90* 351.60* 376.70* 338.80*   RBC 10*6/mm3 3.51* 3.48* 3.01* 3.30* 3.28*   HEMOGLOBIN g/dL 8.6* 8.4* 7.2* 7.8* 7.9*   HEMATOCRIT % 27.2* 27.3* 25.7* 27.3* 26.1*   MCV fL 77.5* 78.7* 85.3 82.8 79.4   PLATELETS 10*3/mm3 449 439 410 404 322     CMP   Results from last 7 days   Lab Units 11/06/22  0045 11/05/22  0149 11/03/22  0957 10/31/22  1323   SODIUM mmol/L 133* 135* 134* 138   POTASSIUM mmol/L 4.6 4.5 4.5 3.6   CHLORIDE mmol/L 94* 96* 96* 101   CO2 mmol/L 27.0 27.0 19.0* 25.0   BUN mg/dL 30* 26* 11 12   CREATININE mg/dL 0.90 1.02* 0.96 0.77   GLUCOSE mg/dL 199* 190* 411* 166*   ALBUMIN g/dL  --   --  3.40* 3.20*   BILIRUBIN mg/dL  --   --  1.2 1.0   ALK PHOS U/L  --   --  1,826* 1,747*   AST (SGOT) U/L  --   --  22 19   ALT (SGPT) U/L  --   --  11 10   LIPASE U/L  --   --   --  10*     ProBNP      TROPONIN  Results from last 7 days   Lab Units 11/03/22  1150   TROPONIN T ng/mL <0.010     CoAg      Creatinine Clearance  Estimated Creatinine Clearance: 73.2 mL/min (by C-G formula based on SCr of 0.9 mg/dL).    Radiology  No radiology results for the last day    EKG    I personally viewed and interpreted the patient's EKG/Telemetry data:    ECHOCARDIOGRAM:  Results for orders placed during the hospital encounter of 06/29/22    Adult Transthoracic Echo Complete W/ Cont if Necessary Per Protocol    Interpretation Summary  · The left ventricular cavity is mildly dilated.  · Left ventricular wall thickness is consistent with mild concentric hypertrophy.  · Left ventricular ejection fraction appears to be 41 - 45%.  · Left ventricular diastolic function is consistent with (grade Ia w/high LAP) impaired relaxation.      STRESS  MYOVIEW:  Results from 11/6/2022:  •  Left ventricular ejection fraction is normal (Calculated EF = 50%).  •  Myocardial perfusion imaging indicates a normal myocardial perfusion study with no evidence of ischemia.  •  Findings consistent with a normal ECG stress test.      CARDIAC CATHETERIZATION:    OTHER:       ASSESSMENT/PLAN      Chest pain, unspecified type    CML (chronic myelocytic leukemia) ([facility])    Depression with anxiety    History of pulmonary embolism    Medically noncompliant    Type 2 diabetes mellitus with diabetic polyneuropathy, with long-term current use of insulin ([facility])    Acute diastolic CHF (congestive heart failure) ([facility])    NICM (nonischemic cardiomyopathy) ([facility])      PLAN  Ruled out for MI by enzymes  Started on low-dose beta-blockers for CHF  Strict I&Os  Standing daily weights  Continue diuresis  Continue current Rx and supportive care      Addendum:    Ms. Mc was seen and examined today, I am in agreement with the note outlined by nurse practitioner Sindhu Goldberg and I would add the following.    Ms. Mc is a 47-year-old female patient who has CML, nonischemic cardiomyopathy, presented to the hospital with CHF exacerbation.  A stress test was done which showed no perfusion defects, ejection fraction was low normal.  Patient was treated with IV Lasix over the weekend, however she remains fluid overloaded today, she is still short of breath and has lower extremity edema.  I will increase Lasix dose to 40 IV twice a day, also add Entresto low-dose twice daily.  We will reassess her in the morning.        Electronically signed by Hamida Feldman MD, 11/07/22, 6:27 PM EST.

## 2022-11-07 NOTE — PLAN OF CARE
Goal Outcome Evaluation:  Plan of Care Reviewed With: patient        Progress: improving   Pt states having nose bleeds throughout day and beginning of this shift. Resting comfortably since. Heart rate in the 90s throughout shift. Call light in reach, will continue to monitor..

## 2022-11-07 NOTE — PROGRESS NOTES
Hematology/Oncology Inpatient Progress Note    PATIENT NAME: Nieves Mc  : 1975  MRN: [id number]    CHIEF COMPLAINT: Chest pain    HISTORY OF PRESENT ILLNESS:      Nieves Mc is a 47 y.o. female who presented to [facility] on 11/3/2022 with complaints of chest pain.  Past medical history significant for CML, depression with anxiety, PE, type 2 diabetes mellitus.  Patient reported that around 9 AM the day of presentation she began to have chest pain.  Stated it was accompanied by the loss of hearing in her left ear and left arm numbness.  She reported she has never experienced this previous.  Since being in the emergency department the chest pain has subsided.  She describes the chest pain as dull.  She did not take anything at home to try to alleviate it.  She reported chronic leg swelling but denied any recent weight gain.  She reported shortness of breath.  She reported chronic cough.  She denied any fever or chills.  She also complained of nausea, vomiting and diarrhea. She was most recently seen in the emergency department on 10/31/2022 for abdominal pain at the instruction of her oncologist.  A CT of the abdomen and pelvis was fairly unchanged from prior and showed severe generalized anasarca and severe hepatosplenomegaly.  A CT of the chest was performed to rule out pulmonary embolism which was also negative and therefore the patient was discharged home.     Evaluation in the ED showed a negative troponin less than 0.010, elevated proBNP at 29,519, glucose 411, normal kidney function, elevated alk phos 1826.  WBC elevated at 376.70, hemoglobin 7.8, MCV 82.8, platelets 404,000, ANC 48.97, blasts 66%.  EKG showed sinus tachycardia, chest x-ray showed cardiomegaly and mild pulmonary vascular congestion which is increased in comparison.  The patient received Lasix, regular insulin, Nitrostat and a normal saline 250 mL bolus in the ED.  She was admitted to the hospital for further  evaluation and treatment.     11/03/22  Hematology/Oncology was consulted on a patient known to us and followed in the office by Dr. Lerma for her diagnosis of CML currently on imatinib and hydroxyurea.  Patient was initially seen in consultation in October 2018 while she was inpatient at [facility] with CML.  At that time she was under the care of Dr. Roach and  and was managed on imatinib.  Patient had a repeat bone marrow aspiration and biopsy in December 2018 that was consistent with chronic myeloid leukemia.  BCR ABL positive, chronic phase.  ABL kinase mutational analysis negative.  Patient was initiated on Tasigna in February 2019 but this was discontinued in June 2022 due to diagnosis of cardiomyopathy.  At that time it was determined that the imatinib would be the safer treatment option and she was reinitiated on this.  Her course has been complicated due to noncompliance and difficulty tolerating TKI's.    Flow Cytometry 11/3/22 - increased neutrophilic cells, increased atypical myeloid blasts (13% of leukocytes), aberrant CD56 expression on granulocytes and monocytes, increased basophils (7% of leukocytes)     11/4/22: Imatinib 400 mg daily restarted     11/5/22: Continues Hydrea 2500 mg daily, Imatinib 400 mg daily, allopurinol 100 mg daily. Patient denies chest pain or SOB. Discussed with patient that WBC is 284.9 today. Patient relays that she started her home supply of imatinib yesterday at 400 mg.      She  has a past medical history of Bone pain, COVID-19 virus infection (01/12/2022), Diabetes mellitus (HCC), Extremity pain, Leg pain, Migraine, Pulmonary embolism (HCC), and Vision loss.     PCP: Alida Latham APRN    History of present illness was reviewed and is unchanged from the previous visit. 11/05/22      Subjective      Patient denies any new issues today      ROS:    Review of Systems   Constitutional: Positive for fatigue. Negative for activity change, appetite change, chills,  diaphoresis, fever and unexpected weight change.   HENT: Positive for mouth sores. Negative for congestion, dental problem, drooling, ear discharge, ear pain, facial swelling, hearing loss, nosebleeds, postnasal drip, rhinorrhea, sinus pressure, sinus pain, sneezing, sore throat, tinnitus, trouble swallowing and voice change.    Eyes: Negative for photophobia, pain, discharge, redness, itching and visual disturbance.   Respiratory: Positive for cough and shortness of breath. Negative for apnea, choking, chest tightness, wheezing and stridor.    Cardiovascular: Positive for leg swelling. Negative for chest pain and palpitations.   Gastrointestinal: Negative for abdominal distention, abdominal pain, anal bleeding, blood in stool, constipation, diarrhea, nausea, rectal pain and vomiting.   Endocrine: Negative for cold intolerance, heat intolerance, polydipsia, polyphagia and polyuria.   Genitourinary: Negative for decreased urine volume, difficulty urinating, dysuria, flank pain, frequency, genital sores, hematuria and urgency.   Musculoskeletal: Negative for arthralgias, back pain, gait problem, joint swelling, myalgias, neck pain and neck stiffness.   Skin: Negative for color change, pallor, rash and wound.   Neurological: Positive for weakness. Negative for dizziness, tremors, seizures, syncope, facial asymmetry, speech difficulty, light-headedness, numbness and headaches.   Hematological: Negative for adenopathy. Does not bruise/bleed easily.   Psychiatric/Behavioral: Negative for agitation, behavioral problems, confusion, decreased concentration, hallucinations, self-injury, sleep disturbance and suicidal ideas. The patient is not nervous/anxious and is not hyperactive.         MEDICATIONS:    Scheduled Meds:  allopurinol, 100 mg, Oral, Q8H  aspirin, 325 mg, Oral, Daily  budesonide-formoterol, 2 puff, Inhalation, BID - RT  carvedilol, 6.25 mg, Oral, BID With Meals  citalopram, 10 mg, Oral, Daily  enoxaparin, 40  "mg, Subcutaneous, Q24H  fentaNYL, 1 patch, Transdermal, Q72H  First Mouthwash (Magic Mouthwash), 5 mL, Swish & Spit, Q4H  folic acid, 1 mg, Oral, Daily  furosemide, 20 mg, Intravenous, BID  guaiFENesin, 600 mg, Oral, Q12H  imatinib, 400 mg, Oral, Daily  insulin glargine, 30 Units, Subcutaneous, Daily  insulin lispro, 10 Units, Subcutaneous, TID AC  insulin lispro, 2-9 Units, Subcutaneous, TID With Meals  mirtazapine, 30 mg, Oral, Nightly  sodium chloride, 250 mL, Intravenous, Once  sodium chloride, 10 mL, Intravenous, Q12H       Continuous Infusions:      PRN Meds:  •  acetaminophen **OR** acetaminophen **OR** acetaminophen  •  ALPRAZolam  •  aluminum-magnesium hydroxide-simethicone  •  dextrose  •  dextrose  •  glucagon (human recombinant)  •  HYDROcodone-acetaminophen  •  influenza vaccine  •  melatonin  •  nitroglycerin  •  ondansetron **OR** ondansetron  •  potassium chloride **OR** potassium chloride **OR** potassium chloride  •  sodium chloride  •  sodium chloride     ALLERGIES:    Allergies   Allergen Reactions   • Hydromorphone GI Intolerance     dilaudid   • Morphine Nausea And Vomiting   • Propofol Hives     Previously tolerated being premedicated with diphenhydramine and famotadine       Objective    VITALS:   /70 (BP Location: Left arm, Patient Position: Lying)   Pulse 97   Temp 98.2 °F (36.8 °C) (Oral)   Resp 18   Ht 162.6 cm (64\")   Wt 68 kg (150 lb)   LMP 05/25/2022 (Approximate)   SpO2 98%   BMI 25.75 kg/m²     PHYSICAL EXAM: (performed by MD)  Physical Exam  Vitals and nursing note reviewed.   Constitutional:       General: She is not in acute distress.     Appearance: She is ill-appearing. She is not toxic-appearing or diaphoretic.      Comments: Pale, ill appearing, edematous. Oriented.    HENT:      Head: Normocephalic and atraumatic.      Right Ear: External ear normal.      Left Ear: External ear normal.      Nose: Nose normal.      Mouth/Throat:      Mouth: Mucous membranes are " moist.      Pharynx: Oropharynx is clear. No oropharyngeal exudate or posterior oropharyngeal erythema.      Comments: Two large ulcerations in the mouth, one on the left buccal mucosa and the second on the mucosa of the inferior right lip. Hematic base for both of them (trauma?)  Eyes:      General: No scleral icterus.        Right eye: No discharge.         Left eye: No discharge.      Conjunctiva/sclera: Conjunctivae normal.      Pupils: Pupils are equal, round, and reactive to light.   Neck:      Thyroid: No thyromegaly.   Cardiovascular:      Rate and Rhythm: Normal rate and regular rhythm.      Pulses: Normal pulses.      Heart sounds: Normal heart sounds. No murmur heard.    No friction rub. No gallop.   Pulmonary:      Effort: Pulmonary effort is normal. No respiratory distress.      Breath sounds: No stridor. No wheezing, rhonchi or rales.   Abdominal:      General: Bowel sounds are normal. There is no distension.      Palpations: Abdomen is soft. There is no mass.      Tenderness: There is no abdominal tenderness. There is no right CVA tenderness, left CVA tenderness, guarding or rebound.      Hernia: No hernia is present.      Comments: Rounded, protuberant and soft. The spleen seems enlarged.    Musculoskeletal:         General: No swelling, tenderness, deformity or signs of injury. Normal range of motion.      Cervical back: Normal range of motion and neck supple. No rigidity.      Right lower leg: No edema.      Left lower leg: No edema.      Comments: Diffuse peripheral edema.    Lymphadenopathy:      Cervical: No cervical adenopathy.   Skin:     General: Skin is warm.      Coloration: Skin is not jaundiced.      Findings: No bruising, erythema, lesion or rash.   Neurological:      General: No focal deficit present.      Mental Status: She is alert and oriented to person, place, and time.      Cranial Nerves: No cranial nerve deficit.      Motor: No weakness or abnormal muscle tone.      Gait: Gait  normal.   Psychiatric:         Mood and Affect: Mood normal.         Behavior: Behavior normal.         Thought Content: Thought content normal.         Judgment: Judgment normal.     I have reexamined the patient and the results are consistent with the previously documented exam. Meghann Lerma MD     RECENT LABS:  Lab Results (last 24 hours)     Procedure Component Value Units Date/Time    POC Glucose Once [[id number]]  (Abnormal) Collected: 11/07/22 0716    Specimen: Blood Updated: 11/07/22 0717     Glucose 281 mg/dL      Comment: Serial Number: [id number]:  [id number]       POC Glucose Once [[id number]]  (Abnormal) Collected: 11/06/22 1726    Specimen: Blood Updated: 11/06/22 1727     Glucose 213 mg/dL      Comment: Serial Number: [id number]:  [id number]       POC Glucose Once [[id number]]  (Abnormal) Collected: 11/06/22 1203    Specimen: Blood Updated: 11/06/22 1204     Glucose 156 mg/dL      Comment: Serial Number: [id number]:  [id number]       Blood Culture - Blood, Arm, Left [[id number]]  (Normal) Collected: 11/05/22 0901    Specimen: Blood from Arm, Left Updated: 11/06/22 0815     Blood Culture No growth at 24 hours          PENDING RESULTS: Flow cytometry    IMAGING REVIEWED:  No radiology results for the last day    Assessment & Plan      ASSESSMENT:  Hematology/Oncology was consulted on a patient known to us and followed in the office by Dr. eLrma for her diagnosis of CML currently on imatinib and hydroxyurea.  Patient has been very noncompliant with treatments.  She is currently on Gleevec 400 mg daily but this was recently increased to 600 mg daily.  She is also supposed to be on hydroxyurea twice a day.  She has been noncompliant with medication intake and also routine follow-up in the office for ongoing care.  Patient has had progressive shortness of breath and swelling on her lower extremities.    1. CML not in remission: Currently on imatinib with plan to increase dose to 600  mg daily once medication is available.  Hydroxyurea is currently on hold. Patient has been noncompliant with her medicines  2. Anemia: Secondary to CML and TKI, hydroxyurea  3. Hyperleukocytosis: Secondary to CML  4. History of pulmonary embolism: On Xarelto as an outpatient.  Lovenox currently.  5. Acute on chronic diastolic heart failure: Management per primary team and cardiology.  6. Multiple chronic conditions: Type 2 diabetes mellitus, hypertension, depression with anxiety.     PLANS:  1. Monitor CBC daily-1 unit pRBC today for hemoglobin of 7.2 has been ordered  2. Reviewed results of flow cytometry  3. Continue Gleevec 400 mg daily  4. She has been seen by psychiatrist and currently on Celexa 10 mg daily.  Remeron increased to 30 mg at night.  Outpatient counseling has been recommended       Electronically signed by Meghann Lerma MD, 11/08/22, 8:04 AM EST.

## 2022-11-07 NOTE — CONSULTS
Heart Failure Program  Nurse Navigator  Discharge Planning: Follow-up Note    Patient Name:Nieves Mc  :1975  Current Admission Date: 11/3/2022       Last 3 Weights:  Wt Readings from Last 3 Encounters:   22 64.7 kg (142 lb 9.6 oz)   10/31/22 68.3 kg (150 lb 9.2 oz)   10/19/22 56.2 kg (123 lb 14.4 oz)       Intake and Output totals: I/O last 3 completed shifts:  In: 700 [P.O.:700]  Out: -   No intake/output data recorded.          Patient Assessment:   pt lying in bed, resp even and unlabored, no soa with conversation. Pt advises she has been up walking in the room with some SOA but has improved. Noted edema to ankles 1+. Pt advises this is about the same, maybe right ankle is better.        Patient Education:   review HF education, review importance of follow-up appointment.     Review HF Education provided to patient:  yes-Symptoms worsening  yes-Prescribed medications  yes-HF self-care  yes-Follow-up Appointments       Acceptance of learning: acceptance, cooperative, teachback    Heart Failure education interactive teaching session time: 20 minutes      Identified needs/barriers:   Volume status, needs follow-up appointment with cardiology, I&O, daily weights.     Intervention follow-up:  Follow-up appointment with cardiology scheduled, Discuss with RN - standing weight obtained.

## 2022-11-07 NOTE — DISCHARGE SUMMARY
[facility] Medicine Services  DISCHARGE SUMMARY    Patient Name: Nieves Mc  : 1975  MRN: [id number]    Date of Admission: 11/3/2022  Discharge Diagnosis:     Chest pain (atypical)  MI ruled out with cardiac enzymes and EKG  Had stress test 2022-negative for ischemia  Was seen by cardiologist who recommended no further work-up  Patient will be discharged to follow-up with the primary cardiologist as outpatient    Acute on chronic  diastolic heart failure  Nonischemic cardiomyopathy  Left ventricular ejection fraction is normal (Calculated EF = 50%)--noted on stress test  On low-dose beta-blocker, losartan and diuretic  Compensated prior to discharge        History of pulmonary embolus       Diabetes mellitus type 2  Continue on home insulin regimen      CML  Gleevec/hydroxyurea  Continued outpatient follow-up with your oncologist recommended     Anxiety/depression  On Remeron/Celexa  Was seen by psychiatrist while inpatient     Generalized weakness  Due to underlying comorbidities  Was seen by physical therapy/Occupational Therapy-no skilled therapy needed            Date of Discharge:2022  Primary Care Physician: Alida Latham APRN      Presenting Problem:   Acute pulmonary edema (HCC) [J81.0]  Leukocytosis, unspecified type [D72.829]  Dyspnea, unspecified type [R06.00]  Chest pain, unspecified type [R07.9]    Active and Resolved Hospital Problems:  Active Hospital Problems    Diagnosis POA   • **Chest pain, unspecified type [R07.9] Yes   • Acute diastolic CHF (congestive heart failure) (HCC) [I50.31] Yes   • NICM (nonischemic cardiomyopathy) (HCC) [I42.8] Yes   • Type 2 diabetes mellitus with diabetic polyneuropathy, with long-term current use of insulin (HCC) [E11.42, Z79.4] Not Applicable   • Depression with anxiety [F41.8] Yes   • Medically noncompliant [Z91.199] Not Applicable   • History of pulmonary embolism [Z86.711] Yes   • CML (chronic  myelocytic leukemia) (HCC) [C92.10] Yes      Resolved Hospital Problems   No resolved problems to display.         Hospital Course     Hospital Course:  Patient is a 47-year-old female with multiple comorbidities including CML, diabetes mellitus type 2, anxiety/depression and chronic diastolic heart failure- nonischemic cardiomyopathy.  Presented to [facility] ED on 11/3/2022 with complaints of chest pain.  Patient's description of chest pain was atypical however she was admitted to rule out acute coronary syndrome and cardiologist consulted.  She has CML with markedly elevated WBC-known to be noncompliant with medication.  Oncologist was consulted for further care.    Conditions addressed while inpatient are as stated below    Chest pain (atypical)  MI ruled out with cardiac enzymes and EKG  Had stress test 11/6/2022-negative for ischemia  Was seen by cardiologist who recommended no further work-up  Patient will be discharged to follow-up with the primary cardiologist as outpatient    Acute on chronic  diastolic heart failure  Nonischemic cardiomyopathy  Left ventricular ejection fraction is normal (Calculated EF = 50%)--noted on stress test  On low-dose beta-blocker, losartan and diuretic  Compensated prior to discharge        History of pulmonary embolus       Diabetes mellitus type 2  Continue on home insulin regimen      CML  Gleevec/hydroxyurea  Continued outpatient follow-up with your oncologist recommended     Anxiety/depression  On Remeron/Celexa  Was seen by psychiatrist while inpatient     Generalized weakness  Due to underlying comorbidities  Was seen by physical therapy/Occupational Therapy-no skilled therapy needed        Condition at discharge-stable          DISCHARGE Follow Up Recommendations for labs and diagnostics:       Reasons For Change In Medications and Indications for New Medications:      Day of Discharge     Vital Signs:  Temp:  [97.6 °F (36.4 °C)-99 °F (37.2 °C)] 98 °F (36.7  °C)  Heart Rate:  [] 99  Resp:  [16-18] 18  BP: ()/(58-79) 125/79    Physical Exam:  Physical Exam  Constitutional:       Comments: Chronically ill looking   HENT:      Head: Normocephalic.      Nose: Nose normal.      Mouth/Throat:      Mouth: Mucous membranes are moist.   Eyes:      Extraocular Movements: Extraocular movements intact.      Conjunctiva/sclera: Conjunctivae normal.      Pupils: Pupils are equal, round, and reactive to light.   Cardiovascular:      Rate and Rhythm: Normal rate and regular rhythm.   Pulmonary:      Effort: No respiratory distress.   Abdominal:      General: Bowel sounds are normal.      Palpations: Abdomen is soft.      Tenderness: There is no abdominal tenderness.   Musculoskeletal:         General: Normal range of motion.      Cervical back: Neck supple.   Skin:     General: Skin is warm and dry.   Neurological:      Mental Status: She is alert and oriented to person, place, and time.   Psychiatric:         Mood and Affect: Mood normal.            Pertinent  and/or Most Recent Results     LAB RESULTS:      Lab 11/06/22 0045 11/05/22  0901 11/05/22 0149 11/04/22  0305 11/03/22  0957   .10*  --  284.90* 351.60* 376.70*   HEMOGLOBIN 8.6*  --  8.4* 7.2* 7.8*   HEMATOCRIT 27.2*  --  27.3* 25.7* 27.3*   PLATELETS 449  --  439 410 404   NEUTROS ABS 31.21*  --  88.32* 151.19* 48.97*   EOS ABS 38.42*  --  5.70* 10.55* 7.53*   MCV 77.5*  --  78.7* 85.3 82.8   PROCALCITONIN  --  0.38*  --   --   --    LDH  --  >2,500*  --   --   --          Lab 11/06/22 0045 11/05/22 0149 11/03/22  0957   SODIUM 133* 135* 134*   POTASSIUM 4.6 4.5 4.5   CHLORIDE 94* 96* 96*   CO2 27.0 27.0 19.0*   ANION GAP 12.0 12.0 19.0*   BUN 30* 26* 11   CREATININE 0.90 1.02* 0.96   EGFR 79.5 68.4 73.6   GLUCOSE 199* 190* 411*   CALCIUM 7.9* 8.3* 8.4*         Lab 11/03/22  0957   TOTAL PROTEIN 6.8   ALBUMIN 3.40*   GLOBULIN 3.4   ALT (SGPT) 11   AST (SGOT) 22   BILIRUBIN 1.2   ALK PHOS 1,826*          Lab 11/03/22  1150 11/03/22  0957   PROBNP  --  29,518.0*   TROPONIN T <0.010 <0.010             Lab 11/04/22  1111   ABO TYPING A   RH TYPING Positive   ANTIBODY SCREEN Positive         Brief Urine Lab Results  (Last result in the past 365 days)      Color   Clarity   Blood   Leuk Est   Nitrite   Protein   CREAT   Urine HCG        07/05/22 1620 Yellow   Clear   Large (3+)   Trace   Negative   Negative               Microbiology Results (last 10 days)     Procedure Component Value - Date/Time    Blood Culture - Blood, Arm, Left [[id number]]  (Normal) Collected: 11/05/22 0901    Lab Status: Preliminary result Specimen: Blood from Arm, Left Updated: 11/07/22 0815     Blood Culture No growth at 2 days    Blood Culture - Blood, Hand, Right [[id number]]  (Normal) Collected: 10/31/22 1410    Lab Status: Final result Specimen: Blood from Hand, Right Updated: 11/05/22 1415     Blood Culture No growth at 5 days    Blood Culture - Blood, Arm, Left [[id number]]  (Normal) Collected: 10/31/22 1410    Lab Status: Final result Specimen: Blood from Arm, Left Updated: 11/05/22 1415     Blood Culture No growth at 5 days          CT Abdomen Pelvis Without Contrast    Result Date: 10/31/2022  Impression:  1. Severe generalized anasarca. 2. Features of mild interstitial and alveolar edema in the imaged lung bases. 3. Severe hepatosplenomegaly, similar to 6/1/2022. 4. Mild to moderate cardiomegaly, without significant change. 5. Cholecystectomy. 6. Small quantity lower abdominal and pelvic ascites, increased since 6/20/2022.    Electronically Signed By-Heather Kelsey MD On:10/31/2022 2:52 PM This report was finalized on [id number] by  Heather Kelsey MD.    XR Chest 1 View    Result Date: 11/3/2022  Impression: IMPRESSION : Cardiomegaly and mild pulmonary vascular congestion which is increase in the comparison. This may be in part artifactual from low lung volumes versus underlying pulmonary edema or pneumonia in the correct clinical  setting[  Electronically Signed By-Freddy Toribio On:11/3/2022 10:26 AM This report was finalized on [id number] by  Freddy Toribio, .    XR Chest 1 View    Result Date: 10/31/2022  Impression: 1.     Improved aeration throughout the lungs bilaterally. There are mild residual hazy groundglass densities throughout the lungs with mild interstitial changes. These finds suggest mild underlying chronic pulmonary edema. Stable cardiomegaly is noted.  Electronically Signed By-Amador Marquez MD On:10/31/2022 2:34 PM This report was finalized on [id number] by  Amador Marquez MD.    CT Angiogram Chest Pulmonary Embolism    Result Date: 10/31/2022  Impression:  1. No pulmonary embolus. 2. There is dilated cardiomegaly and small pleural effusion, not significant changed. A tiny left pleural effusion is also present. 3. There is extensive anasarca and hepatosplenomegaly. Diffuse groundglass opacities are present bilaterally. The overall appearance suggests third spacing of fluid and/or volume overload. No airspace consolidations.  Electronically Signed By-Yobany Ng DO On:10/31/2022 5:32 PM This report was finalized on [id number] by  Yobany Ng DO.      Results for orders placed during the hospital encounter of 03/11/21    Duplex Venous Lower Extremity - Left    Interpretation Summary  · Normal left lower extremity venous duplex scan.      Results for orders placed during the hospital encounter of 03/11/21    Duplex Venous Lower Extremity - Left    Interpretation Summary  · Normal left lower extremity venous duplex scan.      Results for orders placed during the hospital encounter of 06/29/22    Adult Transthoracic Echo Complete W/ Cont if Necessary Per Protocol    Interpretation Summary  · The left ventricular cavity is mildly dilated.  · Left ventricular wall thickness is consistent with mild concentric hypertrophy.  · Left ventricular ejection fraction appears to be 41 - 45%.  · Left ventricular  diastolic function is consistent with (grade Ia w/high LAP) impaired relaxation.      Labs Pending at Discharge:  Pending Labs     Order Current Status    Blood Culture - Blood, Arm, Left Preliminary result          Procedures Performed           Consults:   Consults     Date and Time Order Name Status Description    11/4/2022  1:12 PM Inpatient Psychiatrist Consult Completed     11/3/2022 12:43 PM Inpatient Cardiology Consult Completed     11/3/2022 12:42 PM Hematology & Oncology Inpatient Consult Completed     11/3/2022 11:47 AM Hospitalist (on-call MD unless specified)              Discharge Details        Discharge Medications      New Medications      Instructions Start Date   aspirin 81 MG EC tablet   81 mg, Oral, Daily      carvedilol 6.25 MG tablet  Commonly known as: COREG   6.25 mg, Oral, 2 Times Daily With Meals         Changes to Medications      Instructions Start Date   allopurinol 100 MG tablet  Commonly known as: ZYLOPRIM  What changed: when to take this   100 mg, Oral, 2 Times Daily      hydroxyurea 500 MG capsule  Commonly known as: HYDREA  What changed:   · how much to take  · Another medication with the same name was removed. Continue taking this medication, and follow the directions you see here.   500 mg, Oral, 2 Times Daily      imatinib 400 MG chemo tablet  Commonly known as: GLEEVEC  What changed: Another medication with the same name was removed. Continue taking this medication, and follow the directions you see here.   400 mg, Oral, Daily, Take with food and glass of water. Do not take with Grapefruit juice.         Continue These Medications      Instructions Start Date   ALPRAZolam 0.25 MG tablet  Commonly known as: Xanax   0.25 mg, Oral, Nightly PRN      BASAGLAR KWIKPEN 100 UNIT/ML injection pen   20 Units, Subcutaneous, Daily      citalopram 10 MG tablet  Commonly known as: CeleXA   10 mg, Oral, Daily, To begin 1/31/22      fentaNYL 25 MCG/HR patch  Commonly known as: DURAGESIC   1  patch, Transdermal, Every 72 Hours      folic acid 1 MG tablet  Commonly known as: FOLVITE   1 mg, Oral, Daily      Insulin Lispro 100 UNIT/ML injection pen  Commonly known as: ADMELOG SOLOSTAR   6 Units, Subcutaneous, 3 Times Daily      losartan 25 MG tablet  Commonly known as: COZAAR   25 mg, Oral, Every 24 Hours Scheduled      mirtazapine 15 MG tablet  Commonly known as: REMERON   15 mg, Oral, Nightly, Start Mirtazapine and decrease Trazodone to 1/2 tablet x 14 days and then stop Trazodone.      ondansetron ODT 8 MG disintegrating tablet  Commonly known as: ZOFRAN-ODT   8 mg, Translingual, Every 8 Hours PRN      Symbicort 160-4.5 MCG/ACT inhaler  Generic drug: budesonide-formoterol   2 puffs, Inhalation, 2 Times Daily - RT         Stop These Medications    hydrOXYzine 25 MG tablet  Commonly known as: ATARAX     metoprolol succinate XL 25 MG 24 hr tablet  Commonly known as: TOPROL-XL     Mucus Relief 600 MG 12 hr tablet  Generic drug: guaiFENesin            Allergies   Allergen Reactions   • Hydromorphone GI Intolerance     dilaudid   • Morphine Nausea And Vomiting   • Propofol Hives     Previously tolerated being premedicated with diphenhydramine and famotadine         Discharge Disposition: Home or Self Care    Diet:  Hospital:  Diet Order   Procedures   • Diet Cardiac, Diabetic/Consistent Carbs; Healthy Heart; Diabetic - Consistent Carb         Discharge Activity:   Activity Instructions     Gradually Increase Activity Until at Pre-Hospitalization Level              CODE STATUS:  Code Status and Medical Interventions:   Ordered at: 11/03/22 1245     Level Of Support Discussed With:    Patient     Code Status (Patient has no pulse and is not breathing):    CPR (Attempt to Resuscitate)     Medical Interventions (Patient has pulse or is breathing):    Full Support         Future Appointments   Date Time Provider Department Center   11/16/2022 11:00 AM Hamida Feldman MD [facility]       Additional  Instructions for the Follow-ups that You Need to Schedule     Discharge Follow-up with PCP   As directed       Currently Documented PCP:    Alida Latham APRN    PCP Phone Number:    [phone]     Follow Up Details: Follow-up with PCP as needed         Discharge Follow-up with Specified Provider: Follow-up with your hematologist/oncologist next scheduled appointment   As directed      To: Follow-up with your hematologist/oncologist next scheduled appointment               Time spent on Discharge including face to face service:33 minutes    This patient has been . 11/07/22      Signature: Electronically signed by Steve Lemos MD, 11/07/22, 1:39 PM EST.

## 2022-11-08 PROBLEM — R60.0 BILATERAL LOWER EXTREMITY EDEMA: Status: ACTIVE | Noted: 2022-11-08

## 2022-11-08 LAB
ANION GAP SERPL CALCULATED.3IONS-SCNC: 14 MMOL/L (ref 5–15)
BH BB BLOOD EXPIRATION DATE: NORMAL
BH BB BLOOD EXPIRATION DATE: NORMAL
BH BB BLOOD TYPE BARCODE: 6200
BH BB BLOOD TYPE BARCODE: 6200
BH BB DISPENSE STATUS: NORMAL
BH BB DISPENSE STATUS: NORMAL
BH BB PRODUCT CODE: NORMAL
BH BB PRODUCT CODE: NORMAL
BH BB UNIT NUMBER: NORMAL
BH BB UNIT NUMBER: NORMAL
BUN SERPL-MCNC: 30 MG/DL (ref 6–20)
BUN/CREAT SERPL: 34.9 (ref 7–25)
CALCIUM SPEC-SCNC: 8.5 MG/DL (ref 8.6–10.5)
CHLORIDE SERPL-SCNC: 98 MMOL/L (ref 98–107)
CO2 SERPL-SCNC: 25 MMOL/L (ref 22–29)
CREAT SERPL-MCNC: 0.86 MG/DL (ref 0.57–1)
CROSSMATCH INTERPRETATION: NORMAL
CROSSMATCH INTERPRETATION: NORMAL
EGFRCR SERPLBLD CKD-EPI 2021: 84 ML/MIN/1.73
GLUCOSE BLDC GLUCOMTR-MCNC: 138 MG/DL (ref 70–105)
GLUCOSE BLDC GLUCOMTR-MCNC: 177 MG/DL (ref 70–105)
GLUCOSE BLDC GLUCOMTR-MCNC: 93 MG/DL (ref 70–105)
GLUCOSE SERPL-MCNC: 142 MG/DL (ref 65–99)
MAGNESIUM SERPL-MCNC: 2.1 MG/DL (ref 1.6–2.6)
POTASSIUM SERPL-SCNC: 5 MMOL/L (ref 3.5–5.2)
POTASSIUM SERPL-SCNC: 5.4 MMOL/L (ref 3.5–5.2)
SODIUM SERPL-SCNC: 137 MMOL/L (ref 136–145)
UNIT  ABO: NORMAL
UNIT  ABO: NORMAL
UNIT  RH: NORMAL
UNIT  RH: NORMAL

## 2022-11-08 PROCEDURE — 25010000002 FUROSEMIDE PER 20 MG: Performed by: INTERNAL MEDICINE

## 2022-11-08 PROCEDURE — 94799 UNLISTED PULMONARY SVC/PX: CPT

## 2022-11-08 PROCEDURE — 83735 ASSAY OF MAGNESIUM: CPT | Performed by: NURSE PRACTITIONER

## 2022-11-08 PROCEDURE — 80048 BASIC METABOLIC PNL TOTAL CA: CPT | Performed by: INTERNAL MEDICINE

## 2022-11-08 PROCEDURE — 63710000001 INSULIN GLARGINE PER 5 UNITS: Performed by: NURSE PRACTITIONER

## 2022-11-08 PROCEDURE — 82962 GLUCOSE BLOOD TEST: CPT

## 2022-11-08 PROCEDURE — 84132 ASSAY OF SERUM POTASSIUM: CPT | Performed by: INTERNAL MEDICINE

## 2022-11-08 PROCEDURE — 25010000002 ENOXAPARIN PER 10 MG: Performed by: NURSE PRACTITIONER

## 2022-11-08 PROCEDURE — 80048 BASIC METABOLIC PNL TOTAL CA: CPT | Performed by: NURSE PRACTITIONER

## 2022-11-08 PROCEDURE — 99232 SBSQ HOSP IP/OBS MODERATE 35: CPT | Performed by: NURSE PRACTITIONER

## 2022-11-08 PROCEDURE — 99232 SBSQ HOSP IP/OBS MODERATE 35: CPT | Performed by: INTERNAL MEDICINE

## 2022-11-08 PROCEDURE — 63710000001 INSULIN LISPRO (HUMAN) PER 5 UNITS: Performed by: NURSE PRACTITIONER

## 2022-11-08 RX ORDER — IMATINIB MESYLATE 100 MG/1
200 TABLET, FILM COATED ORAL DAILY
Status: DISCONTINUED | OUTPATIENT
Start: 2022-11-09 | End: 2022-11-17 | Stop reason: HOSPADM

## 2022-11-08 RX ORDER — IMATINIB MESYLATE 400 MG/1
400 TABLET, FILM COATED ORAL DAILY
Status: DISCONTINUED | OUTPATIENT
Start: 2022-11-09 | End: 2022-11-17 | Stop reason: HOSPADM

## 2022-11-08 RX ORDER — METOLAZONE 5 MG/1
5 TABLET ORAL ONCE
Status: COMPLETED | OUTPATIENT
Start: 2022-11-08 | End: 2022-11-08

## 2022-11-08 RX ORDER — METOLAZONE 5 MG/1
5 TABLET ORAL ONCE
Status: DISCONTINUED | OUTPATIENT
Start: 2022-11-08 | End: 2022-11-08

## 2022-11-08 RX ADMIN — ALLOPURINOL 100 MG: 100 TABLET ORAL at 21:00

## 2022-11-08 RX ADMIN — INSULIN LISPRO 10 UNITS: 100 INJECTION, SOLUTION INTRAVENOUS; SUBCUTANEOUS at 12:16

## 2022-11-08 RX ADMIN — MIRTAZAPINE 30 MG: 15 TABLET, FILM COATED ORAL at 20:56

## 2022-11-08 RX ADMIN — METOLAZONE 5 MG: 5 TABLET ORAL at 17:44

## 2022-11-08 RX ADMIN — ALPRAZOLAM 0.5 MG: 0.5 TABLET ORAL at 20:56

## 2022-11-08 RX ADMIN — ASPIRIN 325 MG: 325 TABLET, COATED ORAL at 07:52

## 2022-11-08 RX ADMIN — HYDROCODONE BITARTRATE AND ACETAMINOPHEN 1 TABLET: 7.5; 325 TABLET ORAL at 20:55

## 2022-11-08 RX ADMIN — BUDESONIDE AND FORMOTEROL FUMARATE DIHYDRATE 2 PUFF: 160; 4.5 AEROSOL RESPIRATORY (INHALATION) at 19:02

## 2022-11-08 RX ADMIN — FUROSEMIDE 40 MG: 10 INJECTION, SOLUTION INTRAMUSCULAR; INTRAVENOUS at 18:20

## 2022-11-08 RX ADMIN — IMATINIB 400 MG: 400 TABLET ORAL at 08:08

## 2022-11-08 RX ADMIN — CARVEDILOL 6.25 MG: 6.25 TABLET, FILM COATED ORAL at 17:26

## 2022-11-08 RX ADMIN — GUAIFENESIN 600 MG: 600 TABLET, EXTENDED RELEASE ORAL at 20:56

## 2022-11-08 RX ADMIN — FUROSEMIDE 40 MG: 10 INJECTION, SOLUTION INTRAMUSCULAR; INTRAVENOUS at 07:53

## 2022-11-08 RX ADMIN — INSULIN LISPRO 2 UNITS: 100 INJECTION, SOLUTION INTRAVENOUS; SUBCUTANEOUS at 17:44

## 2022-11-08 RX ADMIN — Medication 10 ML: at 07:51

## 2022-11-08 RX ADMIN — SODIUM ZIRCONIUM CYCLOSILICATE 5 G: 5 POWDER, FOR SUSPENSION ORAL at 17:26

## 2022-11-08 RX ADMIN — HYDROCODONE BITARTRATE AND ACETAMINOPHEN 1 TABLET: 7.5; 325 TABLET ORAL at 05:56

## 2022-11-08 RX ADMIN — INSULIN GLARGINE 30 UNITS: 100 INJECTION, SOLUTION SUBCUTANEOUS at 07:51

## 2022-11-08 RX ADMIN — SACUBITRIL AND VALSARTAN 1 TABLET: 24; 26 TABLET, FILM COATED ORAL at 20:56

## 2022-11-08 RX ADMIN — ALLOPURINOL 100 MG: 100 TABLET ORAL at 05:56

## 2022-11-08 RX ADMIN — Medication 10 ML: at 20:57

## 2022-11-08 RX ADMIN — CITALOPRAM HYDROBROMIDE 10 MG: 20 TABLET ORAL at 07:53

## 2022-11-08 RX ADMIN — FOLIC ACID 1 MG: 1 TABLET ORAL at 07:52

## 2022-11-08 RX ADMIN — ENOXAPARIN SODIUM 40 MG: 100 INJECTION SUBCUTANEOUS at 17:26

## 2022-11-08 RX ADMIN — GUAIFENESIN 600 MG: 600 TABLET, EXTENDED RELEASE ORAL at 07:52

## 2022-11-08 RX ADMIN — ALLOPURINOL 100 MG: 100 TABLET ORAL at 14:28

## 2022-11-08 RX ADMIN — BUDESONIDE AND FORMOTEROL FUMARATE DIHYDRATE 2 PUFF: 160; 4.5 AEROSOL RESPIRATORY (INHALATION) at 07:11

## 2022-11-08 RX ADMIN — INSULIN LISPRO 10 UNITS: 100 INJECTION, SOLUTION INTRAVENOUS; SUBCUTANEOUS at 07:52

## 2022-11-08 RX ADMIN — INSULIN LISPRO 10 UNITS: 100 INJECTION, SOLUTION INTRAVENOUS; SUBCUTANEOUS at 17:44

## 2022-11-08 RX ADMIN — CARVEDILOL 6.25 MG: 6.25 TABLET, FILM COATED ORAL at 07:53

## 2022-11-08 RX ADMIN — SACUBITRIL AND VALSARTAN 1 TABLET: 24; 26 TABLET, FILM COATED ORAL at 07:53

## 2022-11-08 NOTE — PROGRESS NOTES
[facility] Medicine Services Daily Progress Note    Patient Name: Nieves Mc  : 1975  MRN: [id number]  Primary Care Physician:  Alida Latham APRN  Date of admission: 11/3/2022      Subjective          Brief interim history: 47-year-old female with nonischemic cardiomyopathy, HFpEF, PE, anxiety, T2DM, history of medical noncompliance, depression/anxiety and CML. She was admitted on 11/3/2022, presented to [facility] ED, complaining of chest pain that started at around 9 AM while she was at home with her .  Chest pain is described as dull in nature, radiating to the left with paresthesia.  Cardiac biomarkers with troponin negative x3 and EKG shows sinus tachycardia.  Laboratories notable [place] 51102 and chest x-ray showed cardiomegaly. Patient is admitted with atypical chest pain chest pain, rule out for ACS.  Seen in consultation by cardiologist with recommendation and followed by oncologist for CML.     2022: Seen and examined in follow evaluation.  Chest pain-free and resting comfortably in bed.     2022: Seen and examined in follow-up.  Feels and looks better this morning.  No complaints or event.    2022: Seen and examined in follow-up.  Feels slightly better but still dyspneic with activity.  Underwent NST today with result pending      2022  Patient seen and examined  Reported feeling generally weak otherwise no new complaints  Initial plan was to discharge patient today however cardiologist wants to keep patient as she still has bilateral pedal edema  Furosemide frequency was increased and metolazone added    2022  Patient seen and examined  Continues with generalized body weakness  Pedal edema with no significant change despite increased diuretic frequency.  We will continue to monitor     Objective      Vitals:   Temp:  [98.2 °F (36.8 °C)-98.6 °F (37 °C)] 98.4 °F (36.9 °C)  Heart Rate:  [] 97  Resp:  [18] 18  BP:  ()/(53-75) 87/53  Flow (L/min):  [2] 2    Physical Exam  Vitals reviewed.   Constitutional:       Comments: Chronically ill looking   HENT:      Head: Normocephalic.      Nose: Nose normal.      Mouth/Throat:      Mouth: Mucous membranes are moist.   Eyes:      Extraocular Movements: Extraocular movements intact.      Conjunctiva/sclera: Conjunctivae normal.      Pupils: Pupils are equal, round, and reactive to light.   Cardiovascular:      Rate and Rhythm: Normal rate and regular rhythm.   Pulmonary:      Effort: No respiratory distress.   Abdominal:      General: Bowel sounds are normal.      Palpations: Abdomen is soft.      Tenderness: There is no abdominal tenderness.   Musculoskeletal:         General: Normal range of motion.      Cervical back: Neck supple.      Right lower leg: Edema present.      Left lower leg: Edema present.   Skin:     General: Skin is warm.   Neurological:      Mental Status: She is alert and oriented to person, place, and time.   Psychiatric:         Mood and Affect: Mood normal.          Result Review    Result Review:  I have personally reviewed the results from the time of this admission to 11/8/2022 15:53 EST and agree with these findings:  [x]  Laboratory  []  Microbiology  [x]  Radiology  [x]  EKG/Telemetry   [x]  Cardiology/Vascular   []  Pathology  []  Old records  []  Other:  Most notable findings include:       Assessment & Plan        allopurinol, 100 mg, Oral, Q8H  aspirin, 325 mg, Oral, Daily  budesonide-formoterol, 2 puff, Inhalation, BID - RT  carvedilol, 6.25 mg, Oral, BID With Meals  citalopram, 10 mg, Oral, Daily  enoxaparin, 40 mg, Subcutaneous, Q24H  fentaNYL, 1 patch, Transdermal, Q72H  First Mouthwash (Magic Mouthwash), 5 mL, Swish & Spit, Q4H  folic acid, 1 mg, Oral, Daily  furosemide, 40 mg, Intravenous, BID  guaiFENesin, 600 mg, Oral, Q12H  imatinib, 400 mg, Oral, Daily  insulin glargine, 30 Units, Subcutaneous, Daily  insulin lispro, 10 Units,  Subcutaneous, TID AC  insulin lispro, 2-9 Units, Subcutaneous, TID With Meals  metOLazone, 5 mg, Oral, Once  mirtazapine, 30 mg, Oral, Nightly  sacubitril-valsartan, 1 tablet, Oral, Q12H  sodium chloride, 250 mL, Intravenous, Once  sodium chloride, 10 mL, Intravenous, Q12H             Active Hospital Problems:  Active Hospital Problems    Diagnosis    • **Chest pain, unspecified type    • Bilateral lower extremity edema    • Acute diastolic CHF (congestive heart failure) ([facility])    • NICM (nonischemic cardiomyopathy) ([facility])    • Type 2 diabetes mellitus with diabetic polyneuropathy, with long-term current use of insulin ([facility])    • Depression with anxiety    • Medically noncompliant    • History of pulmonary embolism    • CML (chronic myelocytic leukemia) ([facility])      Assessment/plan:      Chest pain (atypical)  MI ruled out with cardiac enzymes and EKG  Had stress test 11/6/2022-negative for ischemia  Was seen by cardiologist who recommended no further work-up  Supportive care     Acute on chronic  diastolic heart failure  Nonischemic cardiomyopathy  Left ventricular ejection fraction is normal (Calculated EF = 50%)--noted on stress test  On low-dose beta-blocker, losartan and diuretic  Still with pedal edema and shortness of breath with exertion  Furosemide increased to 40 mg twice daily, Entresto and metolazone added  Monitor renal function        History of pulmonary embolus        Diabetes mellitus type 2  Continue on insulin regimen  Monitor blood sugar and adjust insulin as needed        CML  On Gleevec/hydroxyurea  Oncologist following     Anxiety/depression  On Remeron/Celexa  Was seen by psychiatrist while inpatient     Generalized weakness  Due to underlying comorbidities  Was seen by physical therapy/Occupational Therapy-no skilled therapy needed      Disposition per cardiology       DVT prophylaxis:  Medical DVT prophylaxis orders are present.    CODE STATUS:    Level Of Support Discussed With: Patient  Code  Status (Patient has no pulse and is not breathing): CPR (Attempt to Resuscitate)  Medical Interventions (Patient has pulse or is breathing): Full Support      Disposition:  I expect patient to be discharged     Electronically signed by Steve Lemos MD, 11/08/22, 15:53 EST.  [facility] Hospitalist Team

## 2022-11-08 NOTE — PROGRESS NOTES
Hematology/Oncology Inpatient Progress Note    PATIENT NAME: Nieves Mc  : 1975  MRN: [id number]    CHIEF COMPLAINT: Chest pain    HISTORY OF PRESENT ILLNESS:      Nieves Mc is a 47 y.o. female who presented to [facility] on 11/3/2022 with complaints of chest pain.  Past medical history significant for CML, depression with anxiety, PE, type 2 diabetes mellitus.  Patient reported that around 9 AM the day of presentation she began to have chest pain.  Stated it was accompanied by the loss of hearing in her left ear and left arm numbness.  She reported she has never experienced this previous.  Since being in the emergency department the chest pain has subsided.  She describes the chest pain as dull.  She did not take anything at home to try to alleviate it.  She reported chronic leg swelling but denied any recent weight gain.  She reported shortness of breath.  She reported chronic cough.  She denied any fever or chills.  She also complained of nausea, vomiting and diarrhea. She was most recently seen in the emergency department on 10/31/2022 for abdominal pain at the instruction of her oncologist.  A CT of the abdomen and pelvis was fairly unchanged from prior and showed severe generalized anasarca and severe hepatosplenomegaly.  A CT of the chest was performed to rule out pulmonary embolism which was also negative and therefore the patient was discharged home.     Evaluation in the ED showed a negative troponin less than 0.010, elevated proBNP at 29,519, glucose 411, normal kidney function, elevated alk phos 1826.  WBC elevated at 376.70, hemoglobin 7.8, MCV 82.8, platelets 404,000, ANC 48.97, blasts 66%.  EKG showed sinus tachycardia, chest x-ray showed cardiomegaly and mild pulmonary vascular congestion which is increased in comparison.  The patient received Lasix, regular insulin, Nitrostat and a normal saline 250 mL bolus in the ED.  She was admitted to the hospital for further  evaluation and treatment.     11/03/22  Hematology/Oncology was consulted on a patient known to us and followed in the office by Dr. Lerma for her diagnosis of CML currently on imatinib and hydroxyurea.  Patient was initially seen in consultation in October 2018 while she was inpatient at [facility] with CML.  At that time she was under the care of Dr. Roach and  and was managed on imatinib.  Patient had a repeat bone marrow aspiration and biopsy in December 2018 that was consistent with chronic myeloid leukemia.  BCR ABL positive, chronic phase.  ABL kinase mutational analysis negative.  Patient was initiated on Tasigna in February 2019 but this was discontinued in June 2022 due to diagnosis of cardiomyopathy.  At that time it was determined that the imatinib would be the safer treatment option and she was reinitiated on this.  Her course has been complicated due to noncompliance and difficulty tolerating TKI's.    Flow Cytometry 11/3/22 - increased neutrophilic cells, increased atypical myeloid blasts (13% of leukocytes), aberrant CD56 expression on granulocytes and monocytes, increased basophils (7% of leukocytes)     11/4/22: Imatinib 400 mg daily restarted     11/5/22: Continues Hydrea 2500 mg daily, Imatinib 400 mg daily, allopurinol 100 mg daily. Patient denies chest pain or SOB. Discussed with patient that WBC is 284.9 today. Patient relays that she started her home supply of imatinib yesterday at 400 mg.      She  has a past medical history of Bone pain, COVID-19 virus infection (01/12/2022), Diabetes mellitus (HCC), Extremity pain, Leg pain, Migraine, Pulmonary embolism (HCC), and Vision loss.     PCP: Alida Latham APRN    History of present illness was reviewed and is unchanged from the previous visit. 11/05/22      Subjective      Patient had an episode of nosebleed earlier on this morning but this has now resolved      ROS:    Review of Systems   Constitutional: Positive for fatigue. Negative for  activity change, appetite change, chills, diaphoresis, fever and unexpected weight change.   HENT: Positive for mouth sores. Negative for congestion, dental problem, drooling, ear discharge, ear pain, facial swelling, hearing loss, nosebleeds, postnasal drip, rhinorrhea, sinus pressure, sinus pain, sneezing, sore throat, tinnitus, trouble swallowing and voice change.    Eyes: Negative for photophobia, pain, discharge, redness, itching and visual disturbance.   Respiratory: Positive for cough and shortness of breath. Negative for apnea, choking, chest tightness, wheezing and stridor.    Cardiovascular: Positive for leg swelling. Negative for chest pain and palpitations.   Gastrointestinal: Negative for abdominal distention, abdominal pain, anal bleeding, blood in stool, constipation, diarrhea, nausea, rectal pain and vomiting.   Endocrine: Negative for cold intolerance, heat intolerance, polydipsia, polyphagia and polyuria.   Genitourinary: Negative for decreased urine volume, difficulty urinating, dysuria, flank pain, frequency, genital sores, hematuria and urgency.   Musculoskeletal: Negative for arthralgias, back pain, gait problem, joint swelling, myalgias, neck pain and neck stiffness.   Skin: Negative for color change, pallor, rash and wound.   Neurological: Positive for weakness. Negative for dizziness, tremors, seizures, syncope, facial asymmetry, speech difficulty, light-headedness, numbness and headaches.   Hematological: Negative for adenopathy. Does not bruise/bleed easily.   Psychiatric/Behavioral: Negative for agitation, behavioral problems, confusion, decreased concentration, hallucinations, self-injury, sleep disturbance and suicidal ideas. The patient is not nervous/anxious and is not hyperactive.         MEDICATIONS:    Scheduled Meds:  allopurinol, 100 mg, Oral, Q8H  aspirin, 325 mg, Oral, Daily  budesonide-formoterol, 2 puff, Inhalation, BID - RT  carvedilol, 6.25 mg, Oral, BID With  "Meals  citalopram, 10 mg, Oral, Daily  enoxaparin, 40 mg, Subcutaneous, Q24H  fentaNYL, 1 patch, Transdermal, Q72H  First Mouthwash (Magic Mouthwash), 5 mL, Swish & Spit, Q4H  folic acid, 1 mg, Oral, Daily  furosemide, 40 mg, Intravenous, BID  guaiFENesin, 600 mg, Oral, Q12H  imatinib, 400 mg, Oral, Daily  insulin glargine, 30 Units, Subcutaneous, Daily  insulin lispro, 10 Units, Subcutaneous, TID AC  insulin lispro, 2-9 Units, Subcutaneous, TID With Meals  mirtazapine, 30 mg, Oral, Nightly  sacubitril-valsartan, 1 tablet, Oral, Q12H  sodium chloride, 250 mL, Intravenous, Once  sodium chloride, 10 mL, Intravenous, Q12H       Continuous Infusions:      PRN Meds:  •  acetaminophen **OR** acetaminophen **OR** acetaminophen  •  ALPRAZolam  •  aluminum-magnesium hydroxide-simethicone  •  dextrose  •  dextrose  •  glucagon (human recombinant)  •  HYDROcodone-acetaminophen  •  influenza vaccine  •  melatonin  •  nitroglycerin  •  ondansetron **OR** ondansetron  •  potassium chloride **OR** potassium chloride **OR** potassium chloride  •  sodium chloride  •  sodium chloride     ALLERGIES:    Allergies   Allergen Reactions   • Hydromorphone GI Intolerance     dilaudid   • Morphine Nausea And Vomiting   • Propofol Hives     Previously tolerated being premedicated with diphenhydramine and famotadine       Objective    VITALS:   /75 (BP Location: Left arm, Patient Position: Sitting)   Pulse 95   Temp 98.6 °F (37 °C) (Oral)   Resp 18   Ht 162.6 cm (64\")   Wt 67 kg (147 lb 11.3 oz)   LMP 05/25/2022 (Approximate)   SpO2 94%   BMI 25.35 kg/m²     PHYSICAL EXAM: (performed by MD)  Physical Exam  Vitals and nursing note reviewed.   Constitutional:       General: She is not in acute distress.     Appearance: She is ill-appearing. She is not toxic-appearing or diaphoretic.      Comments: Pale, ill appearing, edematous. Oriented.    HENT:      Head: Normocephalic and atraumatic.      Right Ear: External ear normal.      " Left Ear: External ear normal.      Nose: Nose normal.      Mouth/Throat:      Mouth: Mucous membranes are moist.      Pharynx: Oropharynx is clear. No oropharyngeal exudate or posterior oropharyngeal erythema.      Comments: Two large ulcerations in the mouth, one on the left buccal mucosa and the second on the mucosa of the inferior right lip. Hematic base for both of them (trauma?)  Eyes:      General: No scleral icterus.        Right eye: No discharge.         Left eye: No discharge.      Conjunctiva/sclera: Conjunctivae normal.      Pupils: Pupils are equal, round, and reactive to light.   Neck:      Thyroid: No thyromegaly.   Cardiovascular:      Rate and Rhythm: Normal rate and regular rhythm.      Pulses: Normal pulses.      Heart sounds: Normal heart sounds. No murmur heard.    No friction rub. No gallop.   Pulmonary:      Effort: Pulmonary effort is normal. No respiratory distress.      Breath sounds: No stridor. No wheezing, rhonchi or rales.   Abdominal:      General: Bowel sounds are normal. There is no distension.      Palpations: Abdomen is soft. There is no mass.      Tenderness: There is no abdominal tenderness. There is no right CVA tenderness, left CVA tenderness, guarding or rebound.      Hernia: No hernia is present.      Comments: Rounded, protuberant and soft. The spleen seems enlarged.    Musculoskeletal:         General: No swelling, tenderness, deformity or signs of injury. Normal range of motion.      Cervical back: Normal range of motion and neck supple. No rigidity.      Right lower leg: No edema.      Left lower leg: No edema.      Comments: Diffuse peripheral edema.    Lymphadenopathy:      Cervical: No cervical adenopathy.   Skin:     General: Skin is warm.      Coloration: Skin is not jaundiced.      Findings: No bruising, erythema, lesion or rash.   Neurological:      General: No focal deficit present.      Mental Status: She is alert and oriented to person, place, and time.       Cranial Nerves: No cranial nerve deficit.      Motor: No weakness or abnormal muscle tone.      Gait: Gait normal.   Psychiatric:         Mood and Affect: Mood normal.         Behavior: Behavior normal.         Thought Content: Thought content normal.         Judgment: Judgment normal.     I have reexamined the patient and the results are consistent with the previously documented exam. Meghann Lerma MD     RECENT LABS:  Lab Results (last 24 hours)     Procedure Component Value Units Date/Time    POC Glucose Once [[id number]]  (Abnormal) Collected: 11/08/22 0715    Specimen: Blood Updated: 11/08/22 0717     Glucose 138 mg/dL      Comment: Serial Number: [id number]:  [id number]       POC Glucose Once [[id number]]  (Abnormal) Collected: 11/07/22 1706    Specimen: Blood Updated: 11/07/22 1707     Glucose 269 mg/dL      Comment: Serial Number: [id number]:  [id number]       POC Glucose Once [[id number]]  (Abnormal) Collected: 11/07/22 1154    Specimen: Blood Updated: 11/07/22 1155     Glucose 201 mg/dL      Comment: Serial Number: [id number]:  [id number]             PENDING RESULTS: Flow cytometry    IMAGING REVIEWED:  No radiology results for the last day    Assessment & Plan      ASSESSMENT:    Hematology/Oncology was consulted on a patient known to us and followed in the office by Dr. Lerma for her diagnosis of CML currently on imatinib and hydroxyurea.  Patient has been very noncompliant with treatments.  She is currently on Gleevec 400 mg daily but this was recently increased to 600 mg daily.  She is also supposed to be on hydroxyurea twice a day.  She has been noncompliant with medication intake and also routine follow-up in the office for ongoing care.  Patient has had progressive shortness of breath and swelling on her lower extremities.    1. CML not in remission: Currently on imatinib with plan to increase dose to 600 mg daily once medication is available.  Hydroxyurea is currently on  hold. Patient has been noncompliant with her medicines in the past.  She will start Gleevec 600 mg p.o. daily today  2. Anemia: Secondary to CML and TKI, hydroxyurea  3. Hyperleukocytosis: Secondary to CML  4. History of pulmonary embolism: On Xarelto as an outpatient.  Lovenox currently.  5. Acute on chronic diastolic heart failure: Management per primary team and cardiology.  6. Multiple chronic conditions: Type 2 diabetes mellitus, hypertension, depression with anxiety.     PLANS:  1. Daily CBC with differential  2. Reviewed results of flow cytometry  3. Increase Gleevec to 600 mg p.o. daily  4. She has been seen by psychiatrist and currently on Celexa 10 mg daily.  Remeron increased to 30 mg at night.  Outpatient counseling has been recommended  5. Discussed with patient and nursing       Electronically signed by Meghann Lerma MD, 11/08/22, 5:22 PM EST.

## 2022-11-08 NOTE — PROGRESS NOTES
[facility] Medicine Services Daily Progress Note    Patient Name: Nieves Mc  : 1975  MRN: [id number]  Primary Care Physician:  Alida Latham APRN  Date of admission: 11/3/2022      Subjective          Brief interim history: 47-year-old female with nonischemic cardiomyopathy, HFpEF, PE, anxiety, T2DM, history of medical noncompliance, depression/anxiety and CML. She was admitted on 11/3/2022, presented to [facility] ED, complaining of chest pain that started at around 9 AM while she was at home with her .  Chest pain is described as dull in nature, radiating to the left with paresthesia.  Cardiac biomarkers with troponin negative x3 and EKG shows sinus tachycardia.  Laboratories notable for proBNP of 08081 and chest x-ray showed cardiomegaly. Patient is admitted with atypical chest pain chest pain, rule out for ACS.  Seen in consultation by cardiologist with recommendation and followed by oncologist for CML.     2022: Seen and examined in follow evaluation.  Chest pain-free and resting comfortably in bed.     2022: Seen and examined in follow-up.  Feels and looks better this morning.  No complaints or event.    2022: Seen and examined in follow-up.  Feels slightly better but still dyspneic with activity.  Underwent NST today with result pending      2022  Patient seen and examined  Reported feeling generally weak otherwise no new complaints  Initial plan was to discharge patient today however cardiologist wants to keep patient as she still has bilateral pedal edema  Furosemide frequency was increased and metolazone added        Objective      Vitals:   Temp:  [98.2 °F (36.8 °C)-98.6 °F (37 °C)] 98.4 °F (36.9 °C)  Heart Rate:  [] 97  Resp:  [18] 18  BP: ()/(53-75) 87/53  Flow (L/min):  [2] 2    Physical Exam  Vitals reviewed.   Constitutional:       Comments: Chronically ill looking   HENT:      Head: Normocephalic.      Nose: Nose normal.       Mouth/Throat:      Mouth: Mucous membranes are moist.   Eyes:      Extraocular Movements: Extraocular movements intact.      Conjunctiva/sclera: Conjunctivae normal.      Pupils: Pupils are equal, round, and reactive to light.   Cardiovascular:      Rate and Rhythm: Normal rate and regular rhythm.   Pulmonary:      Effort: No respiratory distress.   Abdominal:      General: Bowel sounds are normal.      Palpations: Abdomen is soft.      Tenderness: There is no abdominal tenderness.   Musculoskeletal:         General: Normal range of motion.      Cervical back: Neck supple.      Right lower leg: Edema present.      Left lower leg: Edema present.   Skin:     General: Skin is warm.   Neurological:      Mental Status: She is alert and oriented to person, place, and time.   Psychiatric:         Mood and Affect: Mood normal.          Result Review    Result Review:  I have personally reviewed the results from the time of this admission to 11/8/2022 15:33 EST and agree with these findings:  [x]  Laboratory  []  Microbiology  [x]  Radiology  [x]  EKG/Telemetry   [x]  Cardiology/Vascular   []  Pathology  []  Old records  []  Other:  Most notable findings include:       Assessment & Plan        allopurinol, 100 mg, Oral, Q8H  aspirin, 325 mg, Oral, Daily  budesonide-formoterol, 2 puff, Inhalation, BID - RT  carvedilol, 6.25 mg, Oral, BID With Meals  citalopram, 10 mg, Oral, Daily  enoxaparin, 40 mg, Subcutaneous, Q24H  fentaNYL, 1 patch, Transdermal, Q72H  First Mouthwash (Magic Mouthwash), 5 mL, Swish & Spit, Q4H  folic acid, 1 mg, Oral, Daily  furosemide, 40 mg, Intravenous, BID  guaiFENesin, 600 mg, Oral, Q12H  imatinib, 400 mg, Oral, Daily  insulin glargine, 30 Units, Subcutaneous, Daily  insulin lispro, 10 Units, Subcutaneous, TID AC  insulin lispro, 2-9 Units, Subcutaneous, TID With Meals  metOLazone, 5 mg, Oral, Once  mirtazapine, 30 mg, Oral, Nightly  sacubitril-valsartan, 1 tablet, Oral, Q12H  sodium chloride,  250 mL, Intravenous, Once  sodium chloride, 10 mL, Intravenous, Q12H             Active Hospital Problems:  Active Hospital Problems    Diagnosis    • **Chest pain, unspecified type    • Bilateral lower extremity edema    • Acute diastolic CHF (congestive heart failure) ([facility])    • NICM (nonischemic cardiomyopathy) ([facility])    • Type 2 diabetes mellitus with diabetic polyneuropathy, with long-term current use of insulin ([facility])    • Depression with anxiety    • Medically noncompliant    • History of pulmonary embolism    • CML (chronic myelocytic leukemia) ([facility])      Assessment/plan:      Chest pain (atypical)  MI ruled out with cardiac enzymes and EKG  Had stress test 11/6/2022-negative for ischemia  Was seen by cardiologist who recommended no further work-up  Supportive care     Acute on chronic  diastolic heart failure  Nonischemic cardiomyopathy  Left ventricular ejection fraction is normal (Calculated EF = 50%)--noted on stress test  On low-dose beta-blocker, losartan and diuretic  Still with pedal edema and shortness of breath with exertion  Furosemide increased to 40 mg twice daily, Entresto and metolazone added  Monitor renal function        History of pulmonary embolus        Diabetes mellitus type 2  Continue on insulin regimen  Monitor blood sugar and adjust insulin as needed        CML  On Gleevec/hydroxyurea  Oncologist following     Anxiety/depression  On Remeron/Celexa  Was seen by psychiatrist while inpatient     Generalized weakness  Due to underlying comorbidities  Was seen by physical therapy/Occupational Therapy-no skilled therapy needed       DVT prophylaxis:  Medical DVT prophylaxis orders are present.    CODE STATUS:    Level Of Support Discussed With: Patient  Code Status (Patient has no pulse and is not breathing): CPR (Attempt to Resuscitate)  Medical Interventions (Patient has pulse or is breathing): Full Support      Disposition:  I expect patient to be discharged     Electronically signed by  Steve Lemos MD, 11/08/22, 15:33 EST.  [facility] Hospitalist Team

## 2022-11-08 NOTE — PROGRESS NOTES
[facility]   PROGRESS NOTE    Reason for follow-up: chest pain     Patient Care Team:  Alida Latham APRN as PCP - General (Nurse Practitioner)  Meghann Lerma MD as Consulting Physician (Hematology and Oncology)    Subjective    Patient seen and examined.  Chart reviewed.  Labs reviewed.  Discussed with RN taking care of patient.    Patient ok today. No improvement in lower extremity edema despite increase in lasix yesterday. Still with some abdominal pain/tenderness and intermittent shortness of breath. Denies chest pain or palpitations. Will check potassium and magnesium     OBJECTIVE:  11/6/2022: glucose 199, potassium 4.6, Cr 0.90, BUN 30, Ca 7.9, .10  11/7/2022: glucose 281,   11/8/2022: glucose 142, potassium 5.4, Cr 0.86, BUN 30, Ca 8.5, Mg 2.1       Review of Systems   Constitutional: Negative for diaphoresis and fever.   Cardiovascular: Positive for dyspnea on exertion and leg swelling. Negative for chest pain, irregular heartbeat and palpitations.   Respiratory: Positive for shortness of breath. Negative for cough.    Gastrointestinal: Positive for bloating and abdominal pain. Negative for nausea and vomiting.   Neurological: Negative for dizziness and light-headedness.   All other systems reviewed and are negative.      Allergies:  Hydromorphone, Morphine, and Propofol    Scheduled Meds:  allopurinol, 100 mg, Oral, Q8H  aspirin, 325 mg, Oral, Daily  budesonide-formoterol, 2 puff, Inhalation, BID - RT  carvedilol, 6.25 mg, Oral, BID With Meals  citalopram, 10 mg, Oral, Daily  enoxaparin, 40 mg, Subcutaneous, Q24H  fentaNYL, 1 patch, Transdermal, Q72H  First Mouthwash (Magic Mouthwash), 5 mL, Swish & Spit, Q4H  folic acid, 1 mg, Oral, Daily  furosemide, 40 mg, Intravenous, BID  guaiFENesin, 600 mg, Oral, Q12H  imatinib, 400 mg, Oral, Daily  insulin glargine, 30 Units, Subcutaneous, Daily  insulin lispro, 10 Units, Subcutaneous, TID AC  insulin  "lispro, 2-9 Units, Subcutaneous, TID With Meals  mirtazapine, 30 mg, Oral, Nightly  sacubitril-valsartan, 1 tablet, Oral, Q12H  sodium chloride, 250 mL, Intravenous, Once  sodium chloride, 10 mL, Intravenous, Q12H      Continuous Infusions:     PRN Meds:  •  acetaminophen **OR** acetaminophen **OR** acetaminophen  •  ALPRAZolam  •  aluminum-magnesium hydroxide-simethicone  •  dextrose  •  dextrose  •  glucagon (human recombinant)  •  HYDROcodone-acetaminophen  •  influenza vaccine  •  melatonin  •  nitroglycerin  •  ondansetron **OR** ondansetron  •  potassium chloride **OR** potassium chloride **OR** potassium chloride  •  sodium chloride  •  sodium chloride    Objective     VITAL SIGNS  Vitals:    11/08/22 0422 11/08/22 0711 11/08/22 0714 11/08/22 0748   BP: 107/65   116/75   BP Location: Left arm   Left arm   Patient Position: Lying   Sitting   Pulse: 97 97 97 95   Resp: 18 18 18 18   Temp: 98.2 °F (36.8 °C)   98.6 °F (37 °C)   TempSrc: Oral   Oral   SpO2: 90% 94% 94% 94%   Weight: 67 kg (147 lb 11.3 oz)      Height:         Flowsheet Rows    Flowsheet Row First Filed Value   Admission Height 162.6 cm (64\") Documented at 11/03/2022 0930   Admission Weight 68 kg (150 lb) Documented at 11/03/2022 0930           TELEMETRY: sinus tachycardia    Physical Exam:  Vitals reviewed.   Constitutional:       General: Awake.      Appearance: Overweight and not in distress. Chronically ill-appearing.   Eyes:      Conjunctiva/sclera: Conjunctivae normal.      Pupils: Pupils are equal, round, and reactive to light.   Pulmonary:      Effort: Pulmonary effort is normal.      Breath sounds: Normal breath sounds.   Cardiovascular:      Tachycardia present. Regular rhythm. Normal S1. Normal S2.      Murmurs: There is no murmur.   Pulses:     Intact distal pulses.   Edema:     Peripheral edema present.     Comments: LLE > RLE edema  Abdominal:      General: Bowel sounds are normal. There is distension.   Musculoskeletal:      Cervical " back: Normal range of motion and neck supple. Skin:     General: Skin is warm and dry.   Neurological:      General: No focal deficit present.      Mental Status: Alert and oriented to person, place and time.   Psychiatric:         Behavior: Behavior is cooperative.          LAB RESULTS (LAST 7 DAYS)  I have reviewed new clinical results.    CBC  Results from last 7 days   Lab Units 11/06/22  0045 11/05/22  0149 11/04/22  0305 11/03/22  0957   WBC 10*3/mm3 240.10* 284.90* 351.60* 376.70*   RBC 10*6/mm3 3.51* 3.48* 3.01* 3.30*   HEMOGLOBIN g/dL 8.6* 8.4* 7.2* 7.8*   HEMATOCRIT % 27.2* 27.3* 25.7* 27.3*   MCV fL 77.5* 78.7* 85.3 82.8   PLATELETS 10*3/mm3 449 439 410 404     CMP   Results from last 7 days   Lab Units 11/08/22  0848 11/06/22  0045 11/05/22  0149 11/03/22  0957   SODIUM mmol/L 137 133* 135* 134*   POTASSIUM mmol/L 5.4* 4.6 4.5 4.5   CHLORIDE mmol/L 98 94* 96* 96*   CO2 mmol/L 25.0 27.0 27.0 19.0*   BUN mg/dL 30* 30* 26* 11   CREATININE mg/dL 0.86 0.90 1.02* 0.96   GLUCOSE mg/dL 142* 199* 190* 411*   ALBUMIN g/dL  --   --   --  3.40*   BILIRUBIN mg/dL  --   --   --  1.2   ALK PHOS U/L  --   --   --  1,826*   AST (SGOT) U/L  --   --   --  22   ALT (SGPT) U/L  --   --   --  11     ProBNP      TROPONIN  Results from last 7 days   Lab Units 11/03/22  1150   TROPONIN T ng/mL <0.010     CoAg      Creatinine Clearance  Estimated Creatinine Clearance: 76.1 mL/min (by C-G formula based on SCr of 0.86 mg/dL).    Radiology  No radiology results for the last day    EKG    I personally viewed and interpreted the patient's EKG/Telemetry data:    ECHOCARDIOGRAM:  Results for orders placed during the hospital encounter of 06/29/22    Adult Transthoracic Echo Complete W/ Cont if Necessary Per Protocol    Interpretation Summary  · The left ventricular cavity is mildly dilated.  · Left ventricular wall thickness is consistent with mild concentric hypertrophy.  · Left ventricular ejection fraction appears to be 41 - 45%.  ·  Left ventricular diastolic function is consistent with (grade Ia w/high LAP) impaired relaxation.      STRESS MYOVIEW:  Results from 11/6/2022:  •  Left ventricular ejection fraction is normal (Calculated EF = 50%).  •  Myocardial perfusion imaging indicates a normal myocardial perfusion study with no evidence of ischemia.  •  Findings consistent with a normal ECG stress test.      CARDIAC CATHETERIZATION:    OTHER:       ASSESSMENT/PLAN      Chest pain, unspecified type    CML (chronic myelocytic leukemia) ([facility])    Depression with anxiety    History of pulmonary embolism    Medically noncompliant    Type 2 diabetes mellitus with diabetic polyneuropathy, with long-term current use of insulin ([facility])    Acute diastolic CHF (congestive heart failure) ([facility])    NICM (nonischemic cardiomyopathy) ([facility])    Bilateral lower extremity edema      PLAN  Ruled out for MI by enzymes  Started on low-dose beta-blockers for CHF  Entresto low-dose added  Strict I&Os  Standing daily weights  Continue diuresis  Continue current Rx and supportive care    Admitting wanting to discharge patient. Will discuss with Dr. Feldman further diuresis strategy.      JP Marquez  11/8/2022  10:35 AM EST  Electronically signed by JP Marquez, 11/08/22, 10:36 AM EST.

## 2022-11-08 NOTE — PLAN OF CARE
Goal Outcome Evaluation:  Plan of Care Reviewed With: patient        Progress: improving   Pt c/o abdominal pain and nose bleeds. Pt reports nose bleed on day shift after receiving heparin shot. Pt resting comfortably through the night with no other complaints. Diuresing. Pt on room air. Will continue to monitor...

## 2022-11-08 NOTE — PROGRESS NOTES
Heart Failure Program  Nurse Navigator  Discharge Planning: Follow-up Note    Patient Name:Nieves Mc  :1975  Current Admission Date: 11/3/2022       Last 3 Weights:  Wt Readings from Last 3 Encounters:   22 62.7 kg (138 lb 3 oz)   10/31/22 68.3 kg (150 lb 9.2 oz)   10/19/22 56.2 kg (123 lb 14.4 oz)       Intake and Output totals: I/O last 3 completed shifts:  In: -   Out: 1400 [Urine:1400]  I/O this shift:  In: 120 [P.O.:120]  Out: -           Patient Assessment:   pt sitting up on side of bed eating a donut upon arrival.  Pt advise her SOA is about the same, states she has been up walking in the room.  Noted edema is slightly better today, LLE 1+> RLE. No soa with conversation      Patient Education:   review importance of follow-up and set cardiology apt for 1 week, discuss HF education and entresto medication newly started    Review HF Education provided to patient:  yes-Symptoms worsening  yes-Prescribed medications  yes-HF self-care  yes-Follow-up Appointments       Acceptance of learning: acceptance, cooperative, teachback    Heart Failure education interactive teaching session time: 20 minutes      Identified needs/barriers:   Pending status, pt did not have standing morning weight - standing weight obtained with patient on my visit. I&O,    Intervention follow-up:  Standing weight obtained, entresto information provided.

## 2022-11-08 NOTE — CASE MANAGEMENT/SOCIAL WORK
Continued Stay Note  ZOHAIB Amor     Patient Name: Nieves Mc  MRN: [id number]  Today's Date: 11/8/2022    Admit Date: 11/3/2022    Plan: DC Plan: Anticipate home with daughter and mother. Watch for home oxygen needs at d/c.   Discharge Plan     Row Name 11/08/22 1520       Plan    Plan DC Plan: Anticipate home with daughter and mother. Watch for home oxygen needs at d/c.    Plan Comments DC Barriers: IV lasix, .10, BP 87/53 ( at 1400 11/8), waiting on Cardiology to clear for discharge.           Expected Discharge Date and Time     Expected Discharge Date Expected Discharge Time    Nov 10, 2022       Phone communication or documentation only- no physical contact with patient or family.    Denisha Hills RN     Office Phone: [phone]  Office Cell: [phone]

## 2022-11-09 LAB
ANION GAP SERPL CALCULATED.3IONS-SCNC: 11 MMOL/L (ref 5–15)
ANISOCYTOSIS BLD QL: ABNORMAL
BASOPHILS # BLD MANUAL: 40 10*3/MM3 (ref 0–0.2)
BASOPHILS NFR BLD MANUAL: 13 % (ref 0–1.5)
BCR ABL RESULT: NORMAL
BLASTS NFR BLD MANUAL: 43 % (ref 0–0)
BUN SERPL-MCNC: 43 MG/DL (ref 6–20)
BUN/CREAT SERPL: 29.3 (ref 7–25)
CALCIUM SPEC-SCNC: 8.3 MG/DL (ref 8.6–10.5)
CHLORIDE SERPL-SCNC: 95 MMOL/L (ref 98–107)
CO2 SERPL-SCNC: 27 MMOL/L (ref 22–29)
CREAT SERPL-MCNC: 1.47 MG/DL (ref 0.57–1)
DEPRECATED RDW RBC AUTO: 50.8 FL (ref 37–54)
EGFRCR SERPLBLD CKD-EPI 2021: 44.1 ML/MIN/1.73
EOSINOPHIL # BLD MANUAL: 27.69 10*3/MM3 (ref 0–0.4)
EOSINOPHIL NFR BLD MANUAL: 9 % (ref 0.3–6.2)
ERYTHROCYTE [DISTWIDTH] IN BLOOD BY AUTOMATED COUNT: 18.5 % (ref 12.3–15.4)
GLUCOSE BLDC GLUCOMTR-MCNC: 289 MG/DL (ref 70–105)
GLUCOSE BLDC GLUCOMTR-MCNC: 333 MG/DL (ref 70–105)
GLUCOSE BLDC GLUCOMTR-MCNC: 387 MG/DL (ref 70–105)
GLUCOSE SERPL-MCNC: 202 MG/DL (ref 65–99)
HCT VFR BLD AUTO: 33.5 % (ref 34–46.6)
HGB BLD-MCNC: 10 G/DL (ref 12–15.9)
LYMPHOCYTES # BLD MANUAL: 6.15 10*3/MM3 (ref 0.7–3.1)
MCH RBC QN AUTO: 24 PG (ref 26.6–33)
MCHC RBC AUTO-ENTMCNC: 30 G/DL (ref 31.5–35.7)
MCV RBC AUTO: 80 FL (ref 79–97)
METAMYELOCYTES NFR BLD MANUAL: 3 % (ref 0–0)
MICROCYTES BLD QL: ABNORMAL
MYELOCYTES NFR BLD MANUAL: 3 % (ref 0–0)
NEUTROPHILS # BLD AUTO: 49.23 10*3/MM3 (ref 1.7–7)
NEUTROPHILS NFR BLD MANUAL: 13 % (ref 42.7–76)
NEUTS BAND NFR BLD MANUAL: 3 % (ref 0–5)
PLATELET # BLD AUTO: 355 10*3/MM3 (ref 140–450)
PMV BLD AUTO: 8.3 FL (ref 6–12)
POIKILOCYTOSIS BLD QL SMEAR: ABNORMAL
POTASSIUM SERPL-SCNC: 5.1 MMOL/L (ref 3.5–5.2)
POTASSIUM SERPL-SCNC: 5.6 MMOL/L (ref 3.5–5.2)
PROMYELOCYTES NFR BLD MANUAL: 11 % (ref 0–0)
RBC # BLD AUTO: 4.19 10*6/MM3 (ref 3.77–5.28)
SCAN SLIDE: NORMAL
SMALL PLATELETS BLD QL SMEAR: ADEQUATE
SODIUM SERPL-SCNC: 133 MMOL/L (ref 136–145)
TOXIC GRANULATION: ABNORMAL
VARIANT LYMPHS NFR BLD MANUAL: 2 % (ref 19.6–45.3)
WBC NRBC COR # BLD: 307.7 10*3/MM3 (ref 3.4–10.8)

## 2022-11-09 PROCEDURE — 63710000001 INSULIN GLARGINE PER 5 UNITS: Performed by: NURSE PRACTITIONER

## 2022-11-09 PROCEDURE — 99232 SBSQ HOSP IP/OBS MODERATE 35: CPT | Performed by: INTERNAL MEDICINE

## 2022-11-09 PROCEDURE — 94799 UNLISTED PULMONARY SVC/PX: CPT

## 2022-11-09 PROCEDURE — 25010000002 ENOXAPARIN PER 10 MG: Performed by: NURSE PRACTITIONER

## 2022-11-09 PROCEDURE — 94664 DEMO&/EVAL PT USE INHALER: CPT

## 2022-11-09 PROCEDURE — 82962 GLUCOSE BLOOD TEST: CPT

## 2022-11-09 PROCEDURE — 85007 BL SMEAR W/DIFF WBC COUNT: CPT | Performed by: NURSE PRACTITIONER

## 2022-11-09 PROCEDURE — 94761 N-INVAS EAR/PLS OXIMETRY MLT: CPT

## 2022-11-09 PROCEDURE — 25010000002 FUROSEMIDE PER 20 MG: Performed by: INTERNAL MEDICINE

## 2022-11-09 PROCEDURE — 85025 COMPLETE CBC W/AUTO DIFF WBC: CPT | Performed by: NURSE PRACTITIONER

## 2022-11-09 PROCEDURE — 84132 ASSAY OF SERUM POTASSIUM: CPT | Performed by: NURSE PRACTITIONER

## 2022-11-09 PROCEDURE — 63710000001 INSULIN LISPRO (HUMAN) PER 5 UNITS: Performed by: NURSE PRACTITIONER

## 2022-11-09 RX ORDER — FUROSEMIDE 10 MG/ML
40 INJECTION INTRAMUSCULAR; INTRAVENOUS DAILY
Status: DISCONTINUED | OUTPATIENT
Start: 2022-11-09 | End: 2022-11-10

## 2022-11-09 RX ADMIN — Medication 10 ML: at 20:54

## 2022-11-09 RX ADMIN — ASPIRIN 325 MG: 325 TABLET, COATED ORAL at 08:22

## 2022-11-09 RX ADMIN — HYDROCODONE BITARTRATE AND ACETAMINOPHEN 1 TABLET: 7.5; 325 TABLET ORAL at 05:47

## 2022-11-09 RX ADMIN — INSULIN LISPRO 7 UNITS: 100 INJECTION, SOLUTION INTRAVENOUS; SUBCUTANEOUS at 17:28

## 2022-11-09 RX ADMIN — ALLOPURINOL 100 MG: 100 TABLET ORAL at 05:47

## 2022-11-09 RX ADMIN — BUDESONIDE AND FORMOTEROL FUMARATE DIHYDRATE 2 PUFF: 160; 4.5 AEROSOL RESPIRATORY (INHALATION) at 07:20

## 2022-11-09 RX ADMIN — IMATINIB MESYLATE 400 MG: 400 TABLET, FILM COATED ORAL at 08:23

## 2022-11-09 RX ADMIN — GUAIFENESIN 600 MG: 600 TABLET, EXTENDED RELEASE ORAL at 08:22

## 2022-11-09 RX ADMIN — IMATINIB MESYLATE 200 MG: 100 TABLET, FILM COATED ORAL at 08:25

## 2022-11-09 RX ADMIN — HYDROCODONE BITARTRATE AND ACETAMINOPHEN 1 TABLET: 7.5; 325 TABLET ORAL at 21:04

## 2022-11-09 RX ADMIN — ALLOPURINOL 100 MG: 100 TABLET ORAL at 15:21

## 2022-11-09 RX ADMIN — INSULIN GLARGINE 30 UNITS: 100 INJECTION, SOLUTION SUBCUTANEOUS at 08:26

## 2022-11-09 RX ADMIN — GUAIFENESIN 600 MG: 600 TABLET, EXTENDED RELEASE ORAL at 20:51

## 2022-11-09 RX ADMIN — SACUBITRIL AND VALSARTAN 1 TABLET: 24; 26 TABLET, FILM COATED ORAL at 08:22

## 2022-11-09 RX ADMIN — FOLIC ACID 1 MG: 1 TABLET ORAL at 08:22

## 2022-11-09 RX ADMIN — CITALOPRAM HYDROBROMIDE 10 MG: 20 TABLET ORAL at 08:22

## 2022-11-09 RX ADMIN — CARVEDILOL 6.25 MG: 6.25 TABLET, FILM COATED ORAL at 08:22

## 2022-11-09 RX ADMIN — FUROSEMIDE 40 MG: 10 INJECTION, SOLUTION INTRAMUSCULAR; INTRAVENOUS at 08:23

## 2022-11-09 RX ADMIN — INSULIN LISPRO 10 UNITS: 100 INJECTION, SOLUTION INTRAVENOUS; SUBCUTANEOUS at 12:31

## 2022-11-09 RX ADMIN — SACUBITRIL AND VALSARTAN 1 TABLET: 24; 26 TABLET, FILM COATED ORAL at 20:51

## 2022-11-09 RX ADMIN — ENOXAPARIN SODIUM 40 MG: 100 INJECTION SUBCUTANEOUS at 17:28

## 2022-11-09 RX ADMIN — INSULIN LISPRO 8 UNITS: 100 INJECTION, SOLUTION INTRAVENOUS; SUBCUTANEOUS at 12:31

## 2022-11-09 RX ADMIN — SODIUM ZIRCONIUM CYCLOSILICATE 10 G: 10 POWDER, FOR SUSPENSION ORAL at 08:21

## 2022-11-09 RX ADMIN — Medication 10 ML: at 08:22

## 2022-11-09 RX ADMIN — ALLOPURINOL 100 MG: 100 TABLET ORAL at 20:51

## 2022-11-09 RX ADMIN — HYDROCODONE BITARTRATE AND ACETAMINOPHEN 1 TABLET: 7.5; 325 TABLET ORAL at 12:32

## 2022-11-09 RX ADMIN — CARVEDILOL 6.25 MG: 6.25 TABLET, FILM COATED ORAL at 17:29

## 2022-11-09 RX ADMIN — INSULIN LISPRO 6 UNITS: 100 INJECTION, SOLUTION INTRAVENOUS; SUBCUTANEOUS at 08:26

## 2022-11-09 RX ADMIN — BUDESONIDE AND FORMOTEROL FUMARATE DIHYDRATE 2 PUFF: 160; 4.5 AEROSOL RESPIRATORY (INHALATION) at 19:22

## 2022-11-09 RX ADMIN — INSULIN LISPRO 10 UNITS: 100 INJECTION, SOLUTION INTRAVENOUS; SUBCUTANEOUS at 17:28

## 2022-11-09 RX ADMIN — MIRTAZAPINE 30 MG: 15 TABLET, FILM COATED ORAL at 20:51

## 2022-11-09 RX ADMIN — INSULIN LISPRO 10 UNITS: 100 INJECTION, SOLUTION INTRAVENOUS; SUBCUTANEOUS at 08:26

## 2022-11-09 NOTE — PROGRESS NOTES
[facility] Medicine Services Daily Progress Note    Patient Name: Nieves Mc  : 1975  MRN: [id number]  Primary Care Physician:  Alida Latham APRN  Date of admission: 11/3/2022      Subjective          Brief interim history: 47-year-old female with nonischemic cardiomyopathy, HFpEF, PE, anxiety, T2DM, history of medical noncompliance, depression/anxiety and CML. She was admitted on 11/3/2022, presented to [facility] ED, complaining of chest pain that started at around 9 AM while she was at home with her .  Chest pain is described as dull in nature, radiating to the left with paresthesia.  Cardiac biomarkers with troponin negative x3 and EKG shows sinus tachycardia.  Laboratories notable for [place]BNP of 06487 and chest x-ray showed cardiomegaly. Patient is admitted with atypical chest pain chest pain, rule out for ACS.  Seen in consultation by cardiologist with recommendation and followed by oncologist for CML.     2022: Seen and examined in follow evaluation.  Chest pain-free and resting comfortably in bed.     2022: Seen and examined in follow-up.  Feels and looks better this morning.  No complaints or event.    2022: Seen and examined in follow-up.  Feels slightly better but still dyspneic with activity.  Underwent NST today with result pending      2022  Patient seen and examined  Reported feeling generally weak otherwise no new complaints  Initial plan was to discharge patient today however cardiologist wants to keep patient as she still has bilateral pedal edema  Furosemide frequency was increased and metolazone added    2022  Patient seen and examined  Continues with generalized body weakness  Pedal edema with no significant change despite increased diuretic frequency.  We will continue to monitor    2022  Patient seen and examined  Has no new complaint  Furosemide was increased to 40 mg twice daily, metolazone and Entresto added by  cardiology.  Patient with acute renal injury-most likely due to diuretics  Also noted to have hyperkalemia-due to acute kidney injury and Entresto use  Was given Lokelma  With decreased diuretic to daily and hold metolazone    Objective      Vitals:   Temp:  [98.4 °F (36.9 °C)-98.8 °F (37.1 °C)] 98.4 °F (36.9 °C)  Heart Rate:  [] 90  Resp:  [16-18] 18  BP: ()/(53-62) 103/62  Flow (L/min):  [2] 2    Physical Exam  Vitals reviewed.   Constitutional:       Comments: Chronically ill looking   HENT:      Head: Normocephalic.      Nose: Nose normal.      Mouth/Throat:      Mouth: Mucous membranes are moist.   Eyes:      Extraocular Movements: Extraocular movements intact.      Conjunctiva/sclera: Conjunctivae normal.      Pupils: Pupils are equal, round, and reactive to light.   Cardiovascular:      Rate and Rhythm: Normal rate and regular rhythm.   Pulmonary:      Effort: No respiratory distress.   Abdominal:      General: Bowel sounds are normal.      Palpations: Abdomen is soft.      Tenderness: There is no abdominal tenderness.   Musculoskeletal:         General: Normal range of motion.      Cervical back: Neck supple.      Right lower leg: Edema present.      Left lower leg: Edema present.   Skin:     General: Skin is warm.   Neurological:      Mental Status: She is alert and oriented to person, place, and time.   Psychiatric:         Mood and Affect: Mood normal.          Result Review    Result Review:  I have personally reviewed the results from the time of this admission to 11/9/2022 13:56 EST and agree with these findings:  [x]  Laboratory  []  Microbiology  [x]  Radiology  [x]  EKG/Telemetry   [x]  Cardiology/Vascular   []  Pathology  []  Old records  []  Other:  Most notable findings include:       Assessment & Plan        allopurinol, 100 mg, Oral, Q8H  aspirin, 325 mg, Oral, Daily  budesonide-formoterol, 2 puff, Inhalation, BID - RT  carvedilol, 6.25 mg, Oral, BID With Meals  citalopram, 10 mg,  Oral, Daily  enoxaparin, 40 mg, Subcutaneous, Q24H  fentaNYL, 1 patch, Transdermal, Q72H  First Mouthwash (Magic Mouthwash), 5 mL, Swish & Spit, Q4H  folic acid, 1 mg, Oral, Daily  furosemide, 40 mg, Intravenous, Daily  guaiFENesin, 600 mg, Oral, Q12H  imatinib, 400 mg, Oral, Daily   And  imatinib, 200 mg, Oral, Daily  insulin glargine, 30 Units, Subcutaneous, Daily  insulin lispro, 10 Units, Subcutaneous, TID AC  insulin lispro, 2-9 Units, Subcutaneous, TID With Meals  mirtazapine, 30 mg, Oral, Nightly  sacubitril-valsartan, 1 tablet, Oral, Q12H  sodium chloride, 250 mL, Intravenous, Once  sodium chloride, 10 mL, Intravenous, Q12H             Active Hospital Problems:  Active Hospital Problems    Diagnosis    • **Chest pain, unspecified type    • Bilateral lower extremity edema    • Acute diastolic CHF (congestive heart failure) ([facility])    • NICM (nonischemic cardiomyopathy) ([facility])    • Type 2 diabetes mellitus with diabetic polyneuropathy, with long-term current use of insulin ([facility])    • Depression with anxiety    • Medically noncompliant    • History of pulmonary embolism    • CML (chronic myelocytic leukemia) ([facility])      Assessment/plan:      Chest pain (atypical)  MI ruled out with cardiac enzymes and EKG  Had stress test 11/6/2022-negative for ischemia  Was seen by cardiologist who recommended no further work-up  Supportive care     Acute on chronic  diastolic heart failure  Nonischemic cardiomyopathy  Left ventricular ejection fraction is normal (Calculated EF = 50%)--noted on stress test  On low-dose beta-blocker, losartan and diuretic  Still with pedal edema and shortness of breath with exertion  Furosemide increased to 40 mg twice daily, Entresto and metolazone added  Patient with acute renal injury-most likely due to diuretics  Also noted to have hyperkalemia-due to acute kidney injury and Entresto use  Was given lokelma  With decreased diuretic to daily and hold metolazone  Monitor renal function    Acute  kidney injury  Most likely due to diuretic  Decreased torsemide to 40 mg daily  Hold metolazone    Hyperkalemia  Was given Lokelma  CHIQUITA in the a.m.        History of pulmonary embolus        Diabetes mellitus type 2  Continue on insulin regimen  Monitor blood sugar and adjust insulin as needed        CML  On Gleevec/hydroxyurea  Oncologist following     Anxiety/depression  On Remeron/Celexa  Was seen by psychiatrist while inpatient     Generalized weakness  Due to underlying comorbidities  Was seen by physical therapy/Occupational Therapy-no skilled therapy needed      Disposition per cardiology       DVT prophylaxis:  Medical DVT prophylaxis orders are present.    CODE STATUS:    Level Of Support Discussed With: Patient  Code Status (Patient has no pulse and is not breathing): CPR (Attempt to Resuscitate)  Medical Interventions (Patient has pulse or is breathing): Full Support      Disposition:  I expect patient to be discharged     Electronically signed by Steve Lemos MD, 11/09/22, 13:56 EST.  [facility] Hospitalist Team

## 2022-11-09 NOTE — PLAN OF CARE
Pt has been pleasant & cooperative. No complaints. Pt remains on room air. Plan is to continue with diuresis. Will continue to monitor.

## 2022-11-09 NOTE — PLAN OF CARE
Goal Outcome Evaluation:  Plan of Care Reviewed With: patient           Outcome Evaluation: Pt has slept throughout the night, however c/o generalized pain earlier in the shift gave PRN Norco. Pt currently getting IV Lasix BID for swelling. Will continue to monitor.

## 2022-11-09 NOTE — PROGRESS NOTES
Hematology/Oncology Inpatient Progress Note    PATIENT NAME: Nieves Mc  : 1975  MRN: [id number]    CHIEF COMPLAINT: Chest pain    HISTORY OF PRESENT ILLNESS:      Nieves Mc is a 47 y.o. female who presented to [facility] on 11/3/2022 with complaints of chest pain.  Past medical history significant for CML, depression with anxiety, PE, type 2 diabetes mellitus.  Patient reported that around 9 AM the day of presentation she began to have chest pain.  Stated it was accompanied by the loss of hearing in her left ear and left arm numbness.  She reported she has never experienced this previous.  Since being in the emergency department the chest pain has subsided.  She describes the chest pain as dull.  She did not take anything at home to try to alleviate it.  She reported chronic leg swelling but denied any recent weight gain.  She reported shortness of breath.  She reported chronic cough.  She denied any fever or chills.  She also complained of nausea, vomiting and diarrhea. She was most recently seen in the emergency department on 10/31/2022 for abdominal pain at the instruction of her oncologist.  A CT of the abdomen and pelvis was fairly unchanged from prior and showed severe generalized anasarca and severe hepatosplenomegaly.  A CT of the chest was performed to rule out pulmonary embolism which was also negative and therefore the patient was discharged home.     Evaluation in the ED showed a negative troponin less than 0.010, elevated proBNP at 29,519, glucose 411, normal kidney function, elevated alk phos 1826.  WBC elevated at 376.70, hemoglobin 7.8, MCV 82.8, platelets 404,000, ANC 48.97, blasts 66%.  EKG showed sinus tachycardia, chest x-ray showed cardiomegaly and mild pulmonary vascular congestion which is increased in comparison.  The patient received Lasix, regular insulin, Nitrostat and a normal saline 250 mL bolus in the ED.  She was admitted to the hospital for further  evaluation and treatment.     11/03/22  Hematology/Oncology was consulted on a patient known to us and followed in the office by Dr. Lerma for her diagnosis of CML currently on imatinib and hydroxyurea.  Patient was initially seen in consultation in October 2018 while she was inpatient at [facility] with CML.  At that time she was under the care of Dr. Roach and  and was managed on imatinib.  Patient had a repeat bone marrow aspiration and biopsy in December 2018 that was consistent with chronic myeloid leukemia.  BCR ABL positive, chronic phase.  ABL kinase mutational analysis negative.  Patient was initiated on Tasigna in February 2019 but this was discontinued in June 2022 due to diagnosis of cardiomyopathy.  At that time it was determined that the imatinib would be the safer treatment option and she was reinitiated on this.  Her course has been complicated due to noncompliance and difficulty tolerating TKI's.    Flow Cytometry 11/3/22 - increased neutrophilic cells, increased atypical myeloid blasts (13% of leukocytes), aberrant CD56 expression on granulocytes and monocytes, increased basophils (7% of leukocytes)     11/4/22: Imatinib 400 mg daily restarted     11/5/22: Continues Hydrea 2500 mg daily, Imatinib 400 mg daily, allopurinol 100 mg daily. Patient denies chest pain or SOB. Discussed with patient that WBC is 284.9 today. Patient relays that she started her home supply of imatinib yesterday at 400 mg.      She  has a past medical history of Bone pain, COVID-19 virus infection (01/12/2022), Diabetes mellitus (HCC), Extremity pain, Leg pain, Migraine, Pulmonary embolism (HCC), and Vision loss.     PCP: Alida Latham APRN    History of present illness was reviewed and is unchanged from the previous visit. 11/05/22      Subjective      Patient denies any issues overnight.  She is tolerating Gleevac 600 mg daily      ROS:    Review of Systems   Constitutional: Positive for fatigue. Negative for activity  change, appetite change, chills, diaphoresis, fever and unexpected weight change.   HENT: Positive for mouth sores. Negative for congestion, dental problem, drooling, ear discharge, ear pain, facial swelling, hearing loss, nosebleeds, postnasal drip, rhinorrhea, sinus pressure, sinus pain, sneezing, sore throat, tinnitus, trouble swallowing and voice change.    Eyes: Negative for photophobia, pain, discharge, redness, itching and visual disturbance.   Respiratory: Positive for cough and shortness of breath. Negative for apnea, choking, chest tightness, wheezing and stridor.    Cardiovascular: Positive for leg swelling. Negative for chest pain and palpitations.   Gastrointestinal: Negative for abdominal distention, abdominal pain, anal bleeding, blood in stool, constipation, diarrhea, nausea, rectal pain and vomiting.   Endocrine: Negative for cold intolerance, heat intolerance, polydipsia, polyphagia and polyuria.   Genitourinary: Negative for decreased urine volume, difficulty urinating, dysuria, flank pain, frequency, genital sores, hematuria and urgency.   Musculoskeletal: Negative for arthralgias, back pain, gait problem, joint swelling, myalgias, neck pain and neck stiffness.   Skin: Negative for color change, pallor, rash and wound.   Neurological: Positive for weakness. Negative for dizziness, tremors, seizures, syncope, facial asymmetry, speech difficulty, light-headedness, numbness and headaches.   Hematological: Negative for adenopathy. Does not bruise/bleed easily.   Psychiatric/Behavioral: Negative for agitation, behavioral problems, confusion, decreased concentration, hallucinations, self-injury, sleep disturbance and suicidal ideas. The patient is not nervous/anxious and is not hyperactive.         MEDICATIONS:    Scheduled Meds:  allopurinol, 100 mg, Oral, Q8H  aspirin, 325 mg, Oral, Daily  budesonide-formoterol, 2 puff, Inhalation, BID - RT  carvedilol, 6.25 mg, Oral, BID With Meals  citalopram, 10  "mg, Oral, Daily  enoxaparin, 40 mg, Subcutaneous, Q24H  fentaNYL, 1 patch, Transdermal, Q72H  First Mouthwash (Magic Mouthwash), 5 mL, Swish & Spit, Q4H  folic acid, 1 mg, Oral, Daily  furosemide, 40 mg, Oral, Daily  guaiFENesin, 600 mg, Oral, Q12H  imatinib, 400 mg, Oral, Daily   And  imatinib, 200 mg, Oral, Daily  insulin glargine, 30 Units, Subcutaneous, Daily  insulin lispro, 10 Units, Subcutaneous, TID AC  insulin lispro, 2-9 Units, Subcutaneous, TID With Meals  mirtazapine, 30 mg, Oral, Nightly  sacubitril-valsartan, 1 tablet, Oral, Q12H  sodium chloride, 250 mL, Intravenous, Once  sodium chloride, 10 mL, Intravenous, Q12H  sodium zirconium cyclosilicate, 10 g, Oral, Once       Continuous Infusions:      PRN Meds:  •  acetaminophen **OR** acetaminophen **OR** acetaminophen  •  ALPRAZolam  •  aluminum-magnesium hydroxide-simethicone  •  dextrose  •  dextrose  •  glucagon (human recombinant)  •  HYDROcodone-acetaminophen  •  influenza vaccine  •  melatonin  •  nitroglycerin  •  ondansetron **OR** ondansetron  •  potassium chloride **OR** potassium chloride **OR** potassium chloride  •  sodium chloride  •  sodium chloride     ALLERGIES:    Allergies   Allergen Reactions   • Hydromorphone GI Intolerance     dilaudid   • Morphine Nausea And Vomiting   • Propofol Hives     Previously tolerated being premedicated with diphenhydramine and famotadine       Objective    VITALS:   BP 94/60 (BP Location: Left arm, Patient Position: Lying)   Pulse 97   Temp 98.2 °F (36.8 °C) (Oral)   Resp 16   Ht 162.6 cm (64\")   Wt 62.2 kg (137 lb 3.2 oz)   LMP 05/25/2022 (Approximate)   SpO2 92%   BMI 23.55 kg/m²     PHYSICAL EXAM:     Physical Exam  Vitals and nursing note reviewed.   Constitutional:       General: She is not in acute distress.     Appearance: She is ill-appearing. She is not toxic-appearing or diaphoretic.      Comments: Pale, ill appearing, edematous. Oriented.    HENT:      Head: Normocephalic and atraumatic. "      Right Ear: External ear normal.      Left Ear: External ear normal.      Nose: Nose normal.      Mouth/Throat:      Mouth: Mucous membranes are moist.      Pharynx: Oropharynx is clear. No oropharyngeal exudate or posterior oropharyngeal erythema.      Comments: Two large ulcerations in the mouth, one on the left buccal mucosa and the second on the mucosa of the inferior right lip. Hematic base for both of them (trauma?)  Eyes:      General: No scleral icterus.        Right eye: No discharge.         Left eye: No discharge.      Conjunctiva/sclera: Conjunctivae normal.      Pupils: Pupils are equal, round, and reactive to light.   Neck:      Thyroid: No thyromegaly.   Cardiovascular:      Rate and Rhythm: Normal rate and regular rhythm.      Pulses: Normal pulses.      Heart sounds: Normal heart sounds. No murmur heard.    No friction rub. No gallop.   Pulmonary:      Effort: Pulmonary effort is normal. No respiratory distress.      Breath sounds: No stridor. No wheezing, rhonchi or rales.   Abdominal:      General: Bowel sounds are normal. There is no distension.      Palpations: Abdomen is soft. There is no mass.      Tenderness: There is no abdominal tenderness. There is no right CVA tenderness, left CVA tenderness, guarding or rebound.      Hernia: No hernia is present.      Comments: Rounded, protuberant and soft. The spleen seems enlarged.    Musculoskeletal:         General: No swelling, tenderness, deformity or signs of injury. Normal range of motion.      Cervical back: Normal range of motion and neck supple. No rigidity.      Right lower leg: No edema.      Left lower leg: No edema.      Comments: Diffuse peripheral edema.    Lymphadenopathy:      Cervical: No cervical adenopathy.   Skin:     General: Skin is warm.      Coloration: Skin is not jaundiced.      Findings: No bruising, erythema, lesion or rash.   Neurological:      General: No focal deficit present.      Mental Status: She is alert and  oriented to person, place, and time.      Cranial Nerves: No cranial nerve deficit.      Motor: No weakness or abnormal muscle tone.      Gait: Gait normal.   Psychiatric:         Mood and Affect: Mood normal.         Behavior: Behavior normal.         Thought Content: Thought content normal.         Judgment: Judgment normal.     I have reexamined the patient and the results are consistent with the previously documented exam. Meghann Lerma MD     RECENT LABS:    Lab Results (last 24 hours)     Procedure Component Value Units Date/Time    Blood Culture - Blood, Arm, Left [[id number]]  (Normal) Collected: 11/05/22 0901    Specimen: Blood from Arm, Left Updated: 11/10/22 0815     Blood Culture No growth at 5 days    POC Glucose Once [[id number]]  (Abnormal) Collected: 11/10/22 0757    Specimen: Blood Updated: 11/10/22 0759     Glucose 163 mg/dL      Comment: Serial Number: [id number]:  [id number]       Manual Differential [[id number]]  (Abnormal) Collected: 11/10/22 0343    Specimen: Blood Updated: 11/10/22 0529     Neutrophil % 19.0 %      Lymphocyte % 5.0 %      Monocyte % 1.0 %      Eosinophil % 5.0 %      Basophil % 16.0 %      Bands %  14.0 %      Metamyelocyte % 9.0 %      Myelocyte % 6.0 %      Blasts % 25.0 %      Neutrophils Absolute 89.66 10*3/mm3      Lymphocytes Absolute 13.59 10*3/mm3      Monocytes Absolute 2.72 10*3/mm3      Eosinophils Absolute 13.59 10*3/mm3      Basophils Absolute 43.47 10*3/mm3      Anisocytosis Slight/1+     WBC Morphology Normal     Large Platelets Slight/1+    Narrative:      Reviewed by Pathologist within the past 30 days on 10.31.22 .      CBC & Differential [[id number]]  (Abnormal) Collected: 11/10/22 0343    Specimen: Blood Updated: 11/10/22 0529    Narrative:      The following orders were created for panel order CBC & Differential.  Procedure                               Abnormality         Status                     ---------                                -----------         ------                     CBC Auto Differential[[id number]]        Abnormal            Final result               Scan Slide[[id number]]                                       Final result                 Please view results for these tests on the individual orders.    Scan Slide [[id number]] Collected: 11/10/22 0343    Specimen: Blood Updated: 11/10/22 0529     Scan Slide --     Comment: See Manual Differential Results       CBC Auto Differential [[id number]]  (Abnormal) Collected: 11/10/22 0343    Specimen: Blood Updated: 11/10/22 0529     .70 10*3/mm3      RBC 3.77 10*6/mm3      Hemoglobin 9.1 g/dL      Hematocrit 30.2 %      MCV 80.3 fL      MCH 24.1 pg      MCHC 30.0 g/dL      RDW 18.8 %      RDW-SD 51.6 fl      MPV 8.2 fL      Platelets 298 10*3/mm3     Narrative:      Modified report. Previous result was Hemogram on 11/10/2022 at 0509 EST.  The previously reported component NRBC is no longer being reported. Previous result was 0.1 /100 WBC (Reference Range: 0.0-0.2 /100 WBC) on 11/10/2022 at 0509 EST.    Basic Metabolic Panel [[id number]]  (Abnormal) Collected: 11/10/22 0343    Specimen: Blood Updated: 11/10/22 0512     Glucose 161 mg/dL      BUN 40 mg/dL      Creatinine 1.52 mg/dL      Sodium 133 mmol/L      Potassium 5.2 mmol/L      Chloride 96 mmol/L      CO2 25.0 mmol/L      Calcium 8.2 mg/dL      BUN/Creatinine Ratio 26.3     Anion Gap 12.0 mmol/L      eGFR 42.4 mL/min/1.73      Comment: National Kidney Foundation and American Society of Nephrology (ASN) Task Force recommended calculation based on the Chronic Kidney Disease Epidemiology Collaboration (CKD-EPI) equation refit without adjustment for race.       Narrative:      GFR Normal >60  Chronic Kidney Disease <60  Kidney Failure <15      BCR / ABL By RT-PCR [[id number]] Collected: 11/03/22 1643    Specimen: Blood Updated: 11/09/22 1808     BCR ABL Result Comment^Text^TXT     Comment: Positive for BCR-ABL1 transcripts, b3a2 and  b2a2 (p210)  DISCLAIMER: REFER TO HARDCOPY OR PDF FOR COMPLETE RESULT.   If synopsis provided, clinical decisions should not be   based on this interfaced synopsis alone.  Performed at:  1 - [facility]  [address] [address]  :  Molly Vega M.D., Ph.D., Phone:  [phone]       Narrative:      Performed at:  1 - [facility]  [address] [address]  :  Molly Vega M.D., Ph.D., Phone:  [phone]    POC Glucose Once [[id number]]  (Abnormal) Collected: 11/09/22 1719    Specimen: Blood Updated: 11/09/22 1720     Glucose 333 mg/dL      Comment: Serial Number: [id number]:  [id number]       POC Glucose Once [[id number]]  (Abnormal) Collected: 11/09/22 1136    Specimen: Blood Updated: 11/09/22 1137     Glucose 387 mg/dL      Comment: Serial Number: [id number]:  [id number]       Manual Differential [[id number]]  (Abnormal) Collected: 11/09/22 0840    Specimen: Blood Updated: 11/09/22 1118     Neutrophil % 13.0 %      Lymphocyte % 2.0 %      Eosinophil % 9.0 %      Basophil % 13.0 %      Bands %  3.0 %      Metamyelocyte % 3.0 %      Myelocyte % 3.0 %      Promyelocyte % 11.0 %      Blasts % 43.0 %      Neutrophils Absolute 49.23 10*3/mm3      Lymphocytes Absolute 6.15 10*3/mm3      Eosinophils Absolute 27.69 10*3/mm3      Basophils Absolute 40.00 10*3/mm3      Anisocytosis Mod/2+     Microcytes Slight/1+     Poikilocytes Slight/1+     Toxic Granulation Slight/1+     Platelet Estimate Adequate    Narrative:      Reviewed by Pathologist within the past 30 days on 10/31/2022.     CBC & Differential [[id number]]  (Abnormal) Collected: 11/09/22 0840    Specimen: Blood Updated: 11/09/22 1118    Narrative:      The following orders were created for panel order CBC & Differential.  Procedure                               Abnormality         Status                     ---------                                -----------         ------                     CBC Auto Differential[[id number]]        Abnormal            Final result               Scan Slide[[id number]]                                       Final result                 Please view results for these tests on the individual orders.    Scan Slide [[id number]] Collected: 11/09/22 0840    Specimen: Blood Updated: 11/09/22 1118     Scan Slide --     Comment: See Manual Differential Results       CBC Auto Differential [[id number]]  (Abnormal) Collected: 11/09/22 0840    Specimen: Blood Updated: 11/09/22 1118     .70 10*3/mm3      RBC 4.19 10*6/mm3      Hemoglobin 10.0 g/dL      Hematocrit 33.5 %      MCV 80.0 fL      MCH 24.0 pg      MCHC 30.0 g/dL      RDW 18.5 %      RDW-SD 50.8 fl      MPV 8.3 fL      Platelets 355 10*3/mm3     Narrative:      Modified report. Previous result was Hemogram on 11/9/2022 at 0934 EST.  The previously reported component NRBC is no longer being reported. Previous result was 0.1 /100 WBC (Reference Range: 0.0-0.2 /100 WBC) on 11/9/2022 at 0934 EST.    Potassium [[id number]]  (Normal) Collected: 11/09/22 0840    Specimen: Blood Updated: 11/09/22 0935     Potassium 5.1 mmol/L           PENDING RESULTS: Flow cytometry    IMAGING REVIEWED:  No radiology results for the last day    Assessment & Plan      ASSESSMENT:    Hematology/Oncology was consulted on a patient known to us and followed in the office by Dr. Lerma for her diagnosis of CML currently on imatinib and hydroxyurea.  Patient has been very noncompliant with treatments.  She is currently on Gleevec 400 mg daily but this was recently increased to 600 mg daily.  She is also supposed to be on hydroxyurea twice a day.  She has been noncompliant with medication intake and also routine follow-up in the office for ongoing care.  Patient has had progressive shortness of breath and swelling on her lower extremities.    1. CML not in remission: Currently on imatinib with  plan to increase dose to 600 mg daily once medication is available.  Hydroxyurea is currently on hold. Patient has been noncompliant with her medicines in the past.  Continue Gleevec 600 mg p.o. daily  2. Anemia: Secondary to CML and TKI, hydroxyurea.  Her hemoglobin has improved to 10 g per DL  3. Hyperleukocytosis: Secondary to CML  4. History of pulmonary embolism: On Xarelto as an outpatient.  Lovenox currently.  5. Acute on chronic diastolic heart failure: Management per primary team and cardiology.  6. Multiple chronic conditions: Type 2 diabetes mellitus, hypertension, depression with anxiety.     PLANS:  1. Daily CBCs  2. Reviewed results of flow cytometry  3. Continue Gleevec to 600 mg p.o. daily  4. She has been seen by psychiatrist and currently on Celexa 10 mg daily.  Remeron increased to 30 mg at night.  Outpatient counseling has been recommended  5. Discussed with patient and nursing      Electronically signed by Meghann Lerma MD, 11/10/22, 8:38 AM EST.

## 2022-11-10 LAB
ANION GAP SERPL CALCULATED.3IONS-SCNC: 12 MMOL/L (ref 5–15)
ANISOCYTOSIS BLD QL: ABNORMAL
BACTERIA SPEC AEROBE CULT: NORMAL
BASOPHILS # BLD MANUAL: 43.47 10*3/MM3 (ref 0–0.2)
BASOPHILS NFR BLD MANUAL: 16 % (ref 0–1.5)
BLASTS NFR BLD MANUAL: 25 % (ref 0–0)
BUN SERPL-MCNC: 40 MG/DL (ref 6–20)
BUN/CREAT SERPL: 26.3 (ref 7–25)
CALCIUM SPEC-SCNC: 8.2 MG/DL (ref 8.6–10.5)
CHLORIDE SERPL-SCNC: 96 MMOL/L (ref 98–107)
CO2 SERPL-SCNC: 25 MMOL/L (ref 22–29)
CREAT SERPL-MCNC: 1.52 MG/DL (ref 0.57–1)
DEPRECATED RDW RBC AUTO: 51.6 FL (ref 37–54)
EGFRCR SERPLBLD CKD-EPI 2021: 42.4 ML/MIN/1.73
EOSINOPHIL # BLD MANUAL: 13.59 10*3/MM3 (ref 0–0.4)
EOSINOPHIL NFR BLD MANUAL: 5 % (ref 0.3–6.2)
ERYTHROCYTE [DISTWIDTH] IN BLOOD BY AUTOMATED COUNT: 18.8 % (ref 12.3–15.4)
GLUCOSE BLDC GLUCOMTR-MCNC: 120 MG/DL (ref 70–105)
GLUCOSE BLDC GLUCOMTR-MCNC: 151 MG/DL (ref 70–105)
GLUCOSE BLDC GLUCOMTR-MCNC: 163 MG/DL (ref 70–105)
GLUCOSE SERPL-MCNC: 161 MG/DL (ref 65–99)
HCT VFR BLD AUTO: 30.2 % (ref 34–46.6)
HGB BLD-MCNC: 9.1 G/DL (ref 12–15.9)
LARGE PLATELETS: ABNORMAL
LYMPHOCYTES # BLD MANUAL: 13.59 10*3/MM3 (ref 0.7–3.1)
LYMPHOCYTES NFR BLD MANUAL: 1 % (ref 5–12)
MCH RBC QN AUTO: 24.1 PG (ref 26.6–33)
MCHC RBC AUTO-ENTMCNC: 30 G/DL (ref 31.5–35.7)
MCV RBC AUTO: 80.3 FL (ref 79–97)
METAMYELOCYTES NFR BLD MANUAL: 9 % (ref 0–0)
MONOCYTES # BLD: 2.72 10*3/MM3 (ref 0.1–0.9)
MYELOCYTES NFR BLD MANUAL: 6 % (ref 0–0)
NEUTROPHILS # BLD AUTO: 89.66 10*3/MM3 (ref 1.7–7)
NEUTROPHILS NFR BLD MANUAL: 19 % (ref 42.7–76)
NEUTS BAND NFR BLD MANUAL: 14 % (ref 0–5)
PLATELET # BLD AUTO: 298 10*3/MM3 (ref 140–450)
PMV BLD AUTO: 8.2 FL (ref 6–12)
POTASSIUM SERPL-SCNC: 5.2 MMOL/L (ref 3.5–5.2)
RBC # BLD AUTO: 3.77 10*6/MM3 (ref 3.77–5.28)
SCAN SLIDE: NORMAL
SODIUM SERPL-SCNC: 133 MMOL/L (ref 136–145)
VARIANT LYMPHS NFR BLD MANUAL: 5 % (ref 19.6–45.3)
WBC MORPH BLD: NORMAL
WBC NRBC COR # BLD: 271.7 10*3/MM3 (ref 3.4–10.8)

## 2022-11-10 PROCEDURE — 94799 UNLISTED PULMONARY SVC/PX: CPT

## 2022-11-10 PROCEDURE — 25010000002 ENOXAPARIN PER 10 MG: Performed by: NURSE PRACTITIONER

## 2022-11-10 PROCEDURE — 99232 SBSQ HOSP IP/OBS MODERATE 35: CPT | Performed by: INTERNAL MEDICINE

## 2022-11-10 PROCEDURE — 85025 COMPLETE CBC W/AUTO DIFF WBC: CPT | Performed by: NURSE PRACTITIONER

## 2022-11-10 PROCEDURE — 80048 BASIC METABOLIC PNL TOTAL CA: CPT | Performed by: INTERNAL MEDICINE

## 2022-11-10 PROCEDURE — 63710000001 INSULIN GLARGINE PER 5 UNITS: Performed by: NURSE PRACTITIONER

## 2022-11-10 PROCEDURE — 93005 ELECTROCARDIOGRAM TRACING: CPT | Performed by: INTERNAL MEDICINE

## 2022-11-10 PROCEDURE — 63710000001 INSULIN LISPRO (HUMAN) PER 5 UNITS: Performed by: NURSE PRACTITIONER

## 2022-11-10 PROCEDURE — 85007 BL SMEAR W/DIFF WBC COUNT: CPT | Performed by: NURSE PRACTITIONER

## 2022-11-10 PROCEDURE — 93010 ELECTROCARDIOGRAM REPORT: CPT | Performed by: INTERNAL MEDICINE

## 2022-11-10 PROCEDURE — 94664 DEMO&/EVAL PT USE INHALER: CPT

## 2022-11-10 PROCEDURE — 82962 GLUCOSE BLOOD TEST: CPT

## 2022-11-10 RX ORDER — FUROSEMIDE 40 MG/1
40 TABLET ORAL DAILY
Status: DISCONTINUED | OUTPATIENT
Start: 2022-11-10 | End: 2022-11-11

## 2022-11-10 RX ADMIN — IMATINIB MESYLATE 200 MG: 100 TABLET, FILM COATED ORAL at 08:25

## 2022-11-10 RX ADMIN — MIRTAZAPINE 30 MG: 15 TABLET, FILM COATED ORAL at 21:22

## 2022-11-10 RX ADMIN — ALLOPURINOL 100 MG: 100 TABLET ORAL at 21:22

## 2022-11-10 RX ADMIN — SACUBITRIL AND VALSARTAN 1 TABLET: 24; 26 TABLET, FILM COATED ORAL at 08:22

## 2022-11-10 RX ADMIN — IMATINIB MESYLATE 400 MG: 400 TABLET, FILM COATED ORAL at 08:24

## 2022-11-10 RX ADMIN — INSULIN LISPRO 2 UNITS: 100 INJECTION, SOLUTION INTRAVENOUS; SUBCUTANEOUS at 17:04

## 2022-11-10 RX ADMIN — Medication 10 ML: at 08:22

## 2022-11-10 RX ADMIN — FUROSEMIDE 40 MG: 40 TABLET ORAL at 08:22

## 2022-11-10 RX ADMIN — INSULIN LISPRO 10 UNITS: 100 INJECTION, SOLUTION INTRAVENOUS; SUBCUTANEOUS at 12:38

## 2022-11-10 RX ADMIN — ENOXAPARIN SODIUM 40 MG: 100 INJECTION SUBCUTANEOUS at 17:04

## 2022-11-10 RX ADMIN — INSULIN GLARGINE 30 UNITS: 100 INJECTION, SOLUTION SUBCUTANEOUS at 08:26

## 2022-11-10 RX ADMIN — BUDESONIDE AND FORMOTEROL FUMARATE DIHYDRATE 2 PUFF: 160; 4.5 AEROSOL RESPIRATORY (INHALATION) at 19:45

## 2022-11-10 RX ADMIN — INSULIN LISPRO 2 UNITS: 100 INJECTION, SOLUTION INTRAVENOUS; SUBCUTANEOUS at 08:23

## 2022-11-10 RX ADMIN — SODIUM ZIRCONIUM CYCLOSILICATE 10 G: 10 POWDER, FOR SUSPENSION ORAL at 09:34

## 2022-11-10 RX ADMIN — CARVEDILOL 6.25 MG: 6.25 TABLET, FILM COATED ORAL at 08:22

## 2022-11-10 RX ADMIN — GUAIFENESIN 600 MG: 600 TABLET, EXTENDED RELEASE ORAL at 08:21

## 2022-11-10 RX ADMIN — FOLIC ACID 1 MG: 1 TABLET ORAL at 08:21

## 2022-11-10 RX ADMIN — INSULIN LISPRO 10 UNITS: 100 INJECTION, SOLUTION INTRAVENOUS; SUBCUTANEOUS at 08:22

## 2022-11-10 RX ADMIN — CARVEDILOL 6.25 MG: 6.25 TABLET, FILM COATED ORAL at 17:04

## 2022-11-10 RX ADMIN — ALLOPURINOL 100 MG: 100 TABLET ORAL at 17:04

## 2022-11-10 RX ADMIN — HYDROCODONE BITARTRATE AND ACETAMINOPHEN 1 TABLET: 7.5; 325 TABLET ORAL at 17:04

## 2022-11-10 RX ADMIN — BUDESONIDE AND FORMOTEROL FUMARATE DIHYDRATE 2 PUFF: 160; 4.5 AEROSOL RESPIRATORY (INHALATION) at 08:32

## 2022-11-10 RX ADMIN — INSULIN LISPRO 10 UNITS: 100 INJECTION, SOLUTION INTRAVENOUS; SUBCUTANEOUS at 17:04

## 2022-11-10 RX ADMIN — CITALOPRAM HYDROBROMIDE 10 MG: 20 TABLET ORAL at 08:22

## 2022-11-10 RX ADMIN — ALLOPURINOL 100 MG: 100 TABLET ORAL at 05:29

## 2022-11-10 RX ADMIN — ASPIRIN 325 MG: 325 TABLET, COATED ORAL at 08:22

## 2022-11-10 RX ADMIN — SACUBITRIL AND VALSARTAN 1 TABLET: 24; 26 TABLET, FILM COATED ORAL at 21:22

## 2022-11-10 RX ADMIN — GUAIFENESIN 600 MG: 600 TABLET, EXTENDED RELEASE ORAL at 21:22

## 2022-11-10 RX ADMIN — FENTANYL 1 PATCH: 25 PATCH, EXTENDED RELEASE TRANSDERMAL at 08:28

## 2022-11-10 RX ADMIN — Medication 10 ML: at 21:23

## 2022-11-10 NOTE — PROGRESS NOTES
Reason for follow-up: CHF     Patient Care Team:  Alida Latham APRN as PCP - General (Nurse Practitioner)  Meghann Lerma MD as Consulting Physician (Hematology and Oncology)    Subjective .   Nieves Mc is feeling better today     ROS    Hydromorphone, Morphine, and Propofol    Scheduled Meds:allopurinol, 100 mg, Oral, Q8H  aspirin, 325 mg, Oral, Daily  budesonide-formoterol, 2 puff, Inhalation, BID - RT  carvedilol, 6.25 mg, Oral, BID With Meals  citalopram, 10 mg, Oral, Daily  enoxaparin, 40 mg, Subcutaneous, Q24H  fentaNYL, 1 patch, Transdermal, Q72H  First Mouthwash (Magic Mouthwash), 5 mL, Swish & Spit, Q4H  folic acid, 1 mg, Oral, Daily  furosemide, 40 mg, Intravenous, Daily  guaiFENesin, 600 mg, Oral, Q12H  imatinib, 400 mg, Oral, Daily   And  imatinib, 200 mg, Oral, Daily  insulin glargine, 30 Units, Subcutaneous, Daily  insulin lispro, 10 Units, Subcutaneous, TID AC  insulin lispro, 2-9 Units, Subcutaneous, TID With Meals  mirtazapine, 30 mg, Oral, Nightly  sacubitril-valsartan, 1 tablet, Oral, Q12H  sodium chloride, 250 mL, Intravenous, Once  sodium chloride, 10 mL, Intravenous, Q12H      Continuous Infusions:   PRN Meds:.•  acetaminophen **OR** acetaminophen **OR** acetaminophen  •  ALPRAZolam  •  aluminum-magnesium hydroxide-simethicone  •  dextrose  •  dextrose  •  glucagon (human recombinant)  •  HYDROcodone-acetaminophen  •  influenza vaccine  •  melatonin  •  nitroglycerin  •  ondansetron **OR** ondansetron  •  potassium chloride **OR** potassium chloride **OR** potassium chloride  •  sodium chloride  •  sodium chloride      VITAL SIGNS  Vitals:    11/09/22 1400 11/09/22 1922 11/09/22 2017 11/10/22 0454   BP: 109/89  102/59 94/60   BP Location:   Left arm Left arm   Patient Position:   Lying Lying   Pulse: 71 70 100 97   Resp: 19 18 18 16   Temp: 97.9 °F (36.6 °C)  98.5 °F (36.9 °C) 98.2 °F (36.8 °C)   TempSrc:   Oral Oral   SpO2: 98% 98% 94% 92%   Weight:       Height:      "      Flowsheet Rows    Flowsheet Row First Filed Value   Admission Height 162.6 cm (64\") Documented at 11/03/2022 0930   Admission Weight 68 kg (150 lb) Documented at 11/03/2022 0930             Physical Exam  VITALS REVIEWED    General:      well developed, in no acute distress.    Head:      normocephalic and atraumatic.    Eyes:      PERRL/EOM intact, conjunctiva and sclera clear with out nystagmus.    Neck:      no masses, thyromegaly,  trachea central with normal respiratory effort and PMI displaced laterally  Lungs:      Clear  Heart:       Regular rate and rhythm  Msk:      no deformity or scoliosis noted of thoracic or lumbar spine.    Pulses:      pulses normal in all 4 extremities.    Extremities:       1+ edema  Neurologic:      no focal deficits.   alert oriented x3  Skin:      intact without lesions or rashes.    Psych:      alert and cooperative; normal mood and affect; normal attention span and concentration.          LAB RESULTS (LAST 7 DAYS)    CBC  Results from last 7 days   Lab Units 11/10/22  0343 11/09/22  0840 11/06/22  0045 11/05/22  0149 11/04/22  0305 11/03/22  0957   WBC 10*3/mm3 271.70* 307.70* 240.10* 284.90* 351.60* 376.70*   RBC 10*6/mm3 3.77 4.19 3.51* 3.48* 3.01* 3.30*   HEMOGLOBIN g/dL 9.1* 10.0* 8.6* 8.4* 7.2* 7.8*   HEMATOCRIT % 30.2* 33.5* 27.2* 27.3* 25.7* 27.3*   MCV fL 80.3 80.0 77.5* 78.7* 85.3 82.8   PLATELETS 10*3/mm3 298 355 449 439 410 404       BMP  Results from last 7 days   Lab Units 11/10/22  0343 11/09/22  0840 11/08/22  2308 11/08/22  1300 11/08/22  0848 11/06/22  0045 11/05/22  0149 11/03/22  0957   SODIUM mmol/L 133*  --  133*  --  137 133* 135* 134*   POTASSIUM mmol/L 5.2 5.1 5.6* 5.0 5.4* 4.6 4.5 4.5   CHLORIDE mmol/L 96*  --  95*  --  98 94* 96* 96*   CO2 mmol/L 25.0  --  27.0  --  25.0 27.0 27.0 19.0*   BUN mg/dL 40*  --  43*  --  30* 30* 26* 11   CREATININE mg/dL 1.52*  --  1.47*  --  0.86 0.90 1.02* 0.96   GLUCOSE mg/dL 161*  --  202*  --  142* 199* 190* " 411*   MAGNESIUM mg/dL  --   --   --   --  2.1  --   --   --        CMP   Results from last 7 days   Lab Units 11/10/22  0343 11/09/22  0840 11/08/22  2308 11/08/22  1300 11/08/22  0848 11/06/22  0045 11/05/22  0149 11/03/22  0957   SODIUM mmol/L 133*  --  133*  --  137 133* 135* 134*   POTASSIUM mmol/L 5.2 5.1 5.6* 5.0 5.4* 4.6 4.5 4.5   CHLORIDE mmol/L 96*  --  95*  --  98 94* 96* 96*   CO2 mmol/L 25.0  --  27.0  --  25.0 27.0 27.0 19.0*   BUN mg/dL 40*  --  43*  --  30* 30* 26* 11   CREATININE mg/dL 1.52*  --  1.47*  --  0.86 0.90 1.02* 0.96   GLUCOSE mg/dL 161*  --  202*  --  142* 199* 190* 411*   ALBUMIN g/dL  --   --   --   --   --   --   --  3.40*   BILIRUBIN mg/dL  --   --   --   --   --   --   --  1.2   ALK PHOS U/L  --   --   --   --   --   --   --  1,826*   AST (SGOT) U/L  --   --   --   --   --   --   --  22   ALT (SGPT) U/L  --   --   --   --   --   --   --  11         BNP        TROPONIN  Results from last 7 days   Lab Units 11/03/22  1150   TROPONIN T ng/mL <0.010       CoAg        Creatinine Clearance  Estimated Creatinine Clearance: 44.9 mL/min (A) (by C-G formula based on SCr of 1.52 mg/dL (H)).    ABG          EKG    I personally reviewed the patient's EKG/Telemetry data: Sinus rhythm      Assessment & Plan       Chest pain, unspecified type    CML (chronic myelocytic leukemia) ([facility])    Depression with anxiety    History of pulmonary embolism    Medically noncompliant    Type 2 diabetes mellitus with diabetic polyneuropathy, with long-term current use of insulin ([facility])    Acute diastolic CHF (congestive heart failure) (HCC)    NICM (nonischemic cardiomyopathy) (HCC)    Bilateral lower extremity edema      Hope A Jesusita  is a 47-year-old female patient who has CML, nonischemic cardiomyopathy, presented to the hospital with CHF exacerbation.  A stress test was done which showed no perfusion defects, ejection fraction was low normal.  Patient was started on Entresto as well as Coreg, she was treated  with IV diuretics, currently on IV Lasix 40 mg a day.  I think she is feeling better from CHF standpoint, still has some swelling.  Recommend switching to p.o. diuretics which should be continued as outpatient.    I discussed the patients findings and my recommendations with patient and agrees with outlined plan.    Hamida Feldman MD  11/10/22  08:02 EST

## 2022-11-10 NOTE — PROGRESS NOTES
[facility] Medicine Services Daily Progress Note    Patient Name: Nieves Mc  : 1975  MRN: [id number]  Primary Care Physician:  Alida Latham APRN  Date of admission: 11/3/2022      Subjective          Brief interim history: 47-year-old female with nonischemic cardiomyopathy, HFpEF, PE, anxiety, T2DM, history of medical noncompliance, depression/anxiety and CML. She was admitted on 11/3/2022, presented to [facility] ED, complaining of chest pain that started at around 9 AM while she was at home with her .  Chest pain is described as dull in nature, radiating to the left with paresthesia.  Cardiac biomarkers with troponin negative x3 and EKG shows sinus tachycardia.  Laboratories notable for proBNP of 56459 and chest x-ray showed cardiomegaly. Patient is admitted with atypical chest pain chest pain, rule out for ACS.  Seen in consultation by cardiologist with recommendation and followed by oncologist for CML.     2022: Seen and examined in follow evaluation.  Chest pain-free and resting comfortably in bed.     2022: Seen and examined in follow-up.  Feels and looks better this morning.  No complaints or event.    2022: Seen and examined in follow-up.  Feels slightly better but still dyspneic with activity.  Underwent NST today with result pending      2022  Patient seen and examined  Reported feeling generally weak otherwise no new complaints  Initial plan was to discharge patient today however cardiologist wants to keep patient as she still has bilateral pedal edema  Furosemide frequency was increased and metolazone added    2022  Patient seen and examined  Continues with generalized body weakness  Pedal edema with no significant change despite increased diuretic frequency.  We will continue to monitor    2022  Patient seen and examined  Has no new complaint  Furosemide was increased to 40 mg twice daily, metolazone and Entresto added by  cardiology.  Patient with acute renal injury-most likely due to diuretics  Also noted to have hyperkalemia-due to acute kidney injury and Entresto use  Was given Lokelma  With decreased diuretic to daily and hold metolazone    11/10/2022  Patient seen and examined  No new complaint  Bilateral lower extremity swelling improving   Still on creatinine now stabilized  Change furosemide to 40 mg daily  BMP in the a.m.    Objective      Vitals:   Temp:  [98.2 °F (36.8 °C)-98.5 °F (36.9 °C)] 98.2 °F (36.8 °C)  Heart Rate:  [] 97  Resp:  [16-18] 16  BP: ()/(59-60) 94/60    Physical Exam  Vitals reviewed.   Constitutional:       Comments: Chronically ill looking   HENT:      Head: Normocephalic.      Nose: Nose normal.      Mouth/Throat:      Mouth: Mucous membranes are moist.   Eyes:      Extraocular Movements: Extraocular movements intact.      Conjunctiva/sclera: Conjunctivae normal.      Pupils: Pupils are equal, round, and reactive to light.   Cardiovascular:      Rate and Rhythm: Normal rate and regular rhythm.   Pulmonary:      Effort: No respiratory distress.   Abdominal:      General: Bowel sounds are normal.      Palpations: Abdomen is soft.      Tenderness: There is no abdominal tenderness.   Musculoskeletal:         General: Normal range of motion.      Cervical back: Neck supple.      Right lower leg: Edema present.      Left lower leg: Edema present.   Skin:     General: Skin is warm.   Neurological:      Mental Status: She is alert and oriented to person, place, and time.   Psychiatric:         Mood and Affect: Mood normal.          Result Review    Result Review:  I have personally reviewed the results from the time of this admission to 11/10/2022 14:02 EST and agree with these findings:  [x]  Laboratory  []  Microbiology  [x]  Radiology  [x]  EKG/Telemetry   [x]  Cardiology/Vascular   []  Pathology  []  Old records  []  Other:  Most notable findings include:       Assessment & Plan         allopurinol, 100 mg, Oral, Q8H  aspirin, 325 mg, Oral, Daily  budesonide-formoterol, 2 puff, Inhalation, BID - RT  carvedilol, 6.25 mg, Oral, BID With Meals  citalopram, 10 mg, Oral, Daily  enoxaparin, 40 mg, Subcutaneous, Q24H  fentaNYL, 1 patch, Transdermal, Q72H  First Mouthwash (Magic Mouthwash), 5 mL, Swish & Spit, Q4H  folic acid, 1 mg, Oral, Daily  furosemide, 40 mg, Oral, Daily  guaiFENesin, 600 mg, Oral, Q12H  imatinib, 400 mg, Oral, Daily   And  imatinib, 200 mg, Oral, Daily  insulin glargine, 30 Units, Subcutaneous, Daily  insulin lispro, 10 Units, Subcutaneous, TID AC  insulin lispro, 2-9 Units, Subcutaneous, TID With Meals  mirtazapine, 30 mg, Oral, Nightly  sacubitril-valsartan, 1 tablet, Oral, Q12H  sodium chloride, 250 mL, Intravenous, Once  sodium chloride, 10 mL, Intravenous, Q12H             Active Hospital Problems:  Active Hospital Problems    Diagnosis    • **Chest pain, unspecified type    • Bilateral lower extremity edema    • Acute diastolic CHF (congestive heart failure) ([facility])    • NICM (nonischemic cardiomyopathy) ([facility])    • Type 2 diabetes mellitus with diabetic polyneuropathy, with long-term current use of insulin ([facility])    • Depression with anxiety    • Medically noncompliant    • History of pulmonary embolism    • CML (chronic myelocytic leukemia) ([facility])      Assessment/plan:      Chest pain (atypical)  MI ruled out with cardiac enzymes and EKG  Had stress test 11/6/2022-negative for ischemia  Was seen by cardiologist who recommended no further work-up  Supportive care     Acute on chronic  diastolic heart failure  Nonischemic cardiomyopathy  Left ventricular ejection fraction is normal (Calculated EF = 50%)--noted on stress test  On low-dose beta-blocker, losartan and diuretic  Still with pedal edema and shortness of breath with exertion  Furosemide increased to 40 mg twice daily, Entresto and metolazone added  Patient with acute renal injury-most likely due to diuretics  Also  noted to have hyperkalemia-due to acute kidney injury and Entresto use  Was given lokelma  With decreased diuretic to daily and hold metolazone  Monitor renal function    Acute kidney injury  Most likely due to diuretic  Decreased furosemide to 40 mg daily  Hold metolazone    Hyperkalemia-improved  Was given Lokelma       History of pulmonary embolus        Diabetes mellitus type 2  Continue on insulin regimen  Monitor blood sugar and adjust insulin as needed        CML  On Gleevec/hydroxyurea  Oncologist following     Anxiety/depression  On Remeron/Celexa  Was seen by psychiatrist while inpatient     Generalized weakness  Due to underlying comorbidities  Was seen by physical therapy/Occupational Therapy-no skilled therapy needed      Disposition per cardiology       DVT prophylaxis:  Medical DVT prophylaxis orders are present.    CODE STATUS:    Level Of Support Discussed With: Patient  Code Status (Patient has no pulse and is not breathing): CPR (Attempt to Resuscitate)  Medical Interventions (Patient has pulse or is breathing): Full Support      Disposition:  I expect patient to be discharged     Electronically signed by Steve Lemos MD, 11/10/22, 14:02 EST.  [facility] Hospitalist Team

## 2022-11-10 NOTE — PROGRESS NOTES
Hematology/Oncology Inpatient Progress Note    PATIENT NAME: Nieves Mc  : 1975  MRN: [id number]    CHIEF COMPLAINT: Chest pain    HISTORY OF PRESENT ILLNESS:      Nieves Mc is a 47 y.o. female who presented to [facility] on 11/3/2022 with complaints of chest pain.  Past medical history significant for CML, depression with anxiety, PE, type 2 diabetes mellitus.  Patient reported that around 9 AM the day of presentation she began to have chest pain.  Stated it was accompanied by the loss of hearing in her left ear and left arm numbness.  She reported she has never experienced this previous.  Since being in the emergency department the chest pain has subsided.  She describes the chest pain as dull.  She did not take anything at home to try to alleviate it.  She reported chronic leg swelling but denied any recent weight gain.  She reported shortness of breath.  She reported chronic cough.  She denied any fever or chills.  She also complained of nausea, vomiting and diarrhea. She was most recently seen in the emergency department on 10/31/2022 for abdominal pain at the instruction of her oncologist.  A CT of the abdomen and pelvis was fairly unchanged from prior and showed severe generalized anasarca and severe hepatosplenomegaly.  A CT of the chest was performed to rule out pulmonary embolism which was also negative and therefore the patient was discharged home.     Evaluation in the ED showed a negative troponin less than 0.010, elevated proBNP at 29,519, glucose 411, normal kidney function, elevated alk phos 1826.  WBC elevated at 376.70, hemoglobin 7.8, MCV 82.8, platelets 404,000, ANC 48.97, blasts 66%.  EKG showed sinus tachycardia, chest x-ray showed cardiomegaly and mild pulmonary vascular congestion which is increased in comparison.  The patient received Lasix, regular insulin, Nitrostat and a normal saline 250 mL bolus in the ED.  She was admitted to the hospital for further  evaluation and treatment.     11/03/22  Hematology/Oncology was consulted on a patient known to us and followed in the office by Dr. Lerma for her diagnosis of CML currently on imatinib and hydroxyurea.  Patient was initially seen in consultation in October 2018 while she was inpatient at [facility] with CML.  At that time she was under the care of Dr. Roach and  and was managed on imatinib.  Patient had a repeat bone marrow aspiration and biopsy in December 2018 that was consistent with chronic myeloid leukemia.  BCR ABL positive, chronic phase.  ABL kinase mutational analysis negative.  Patient was initiated on Tasigna in February 2019 but this was discontinued in June 2022 due to diagnosis of cardiomyopathy.  At that time it was determined that the imatinib would be the safer treatment option and she was reinitiated on this.  Her course has been complicated due to noncompliance and difficulty tolerating TKI's.    Flow Cytometry 11/3/22 - increased neutrophilic cells, increased atypical myeloid blasts (13% of leukocytes), aberrant CD56 expression on granulocytes and monocytes, increased basophils (7% of leukocytes)     11/4/22: Imatinib 400 mg daily restarted     11/5/22: Continues Hydrea 2500 mg daily, Imatinib 400 mg daily, allopurinol 100 mg daily. Patient denies chest pain or SOB. Discussed with patient that WBC is 284.9 today. Patient relays that she started her home supply of imatinib yesterday at 400 mg.      She  has a past medical history of Bone pain, COVID-19 virus infection (01/12/2022), Diabetes mellitus (HCC), Extremity pain, Leg pain, Migraine, Pulmonary embolism (HCC), and Vision loss.     PCP: Alida Latham APRN    History of present illness was reviewed and is unchanged from the previous visit. 11/05/22      Subjective        Patient without any new issues today.  Her mouth lesion has improved not as painful.    ROS:    Review of Systems   Constitutional: Positive for fatigue. Negative for  activity change, appetite change, chills, diaphoresis, fever and unexpected weight change.   HENT: Positive for mouth sores. Negative for congestion, dental problem, drooling, ear discharge, ear pain, facial swelling, hearing loss, nosebleeds, postnasal drip, rhinorrhea, sinus pressure, sinus pain, sneezing, sore throat, tinnitus, trouble swallowing and voice change.    Eyes: Negative for photophobia, pain, discharge, redness, itching and visual disturbance.   Respiratory: Positive for cough and shortness of breath. Negative for apnea, choking, chest tightness, wheezing and stridor.    Cardiovascular: Positive for leg swelling. Negative for chest pain and palpitations.   Gastrointestinal: Negative for abdominal distention, abdominal pain, anal bleeding, blood in stool, constipation, diarrhea, nausea, rectal pain and vomiting.   Endocrine: Negative for cold intolerance, heat intolerance, polydipsia, polyphagia and polyuria.   Genitourinary: Negative for decreased urine volume, difficulty urinating, dysuria, flank pain, frequency, genital sores, hematuria and urgency.   Musculoskeletal: Negative for arthralgias, back pain, gait problem, joint swelling, myalgias, neck pain and neck stiffness.   Skin: Negative for color change, pallor, rash and wound.   Neurological: Positive for weakness. Negative for dizziness, tremors, seizures, syncope, facial asymmetry, speech difficulty, light-headedness, numbness and headaches.   Hematological: Negative for adenopathy. Does not bruise/bleed easily.   Psychiatric/Behavioral: Negative for agitation, behavioral problems, confusion, decreased concentration, hallucinations, self-injury, sleep disturbance and suicidal ideas. The patient is not nervous/anxious and is not hyperactive.         MEDICATIONS:    Scheduled Meds:  allopurinol, 100 mg, Oral, Q8H  aspirin, 325 mg, Oral, Daily  budesonide-formoterol, 2 puff, Inhalation, BID - RT  carvedilol, 6.25 mg, Oral, BID With  "Meals  citalopram, 10 mg, Oral, Daily  enoxaparin, 40 mg, Subcutaneous, Q24H  fentaNYL, 1 patch, Transdermal, Q72H  First Mouthwash (Magic Mouthwash), 5 mL, Swish & Spit, Q4H  folic acid, 1 mg, Oral, Daily  furosemide, 40 mg, Oral, Daily  guaiFENesin, 600 mg, Oral, Q12H  imatinib, 400 mg, Oral, Daily   And  imatinib, 200 mg, Oral, Daily  insulin glargine, 30 Units, Subcutaneous, Daily  insulin lispro, 10 Units, Subcutaneous, TID AC  insulin lispro, 2-9 Units, Subcutaneous, TID With Meals  mirtazapine, 30 mg, Oral, Nightly  sacubitril-valsartan, 1 tablet, Oral, Q12H  sodium chloride, 250 mL, Intravenous, Once  sodium chloride, 10 mL, Intravenous, Q12H  sodium zirconium cyclosilicate, 10 g, Oral, Once       Continuous Infusions:      PRN Meds:  •  acetaminophen **OR** acetaminophen **OR** acetaminophen  •  ALPRAZolam  •  aluminum-magnesium hydroxide-simethicone  •  dextrose  •  dextrose  •  glucagon (human recombinant)  •  HYDROcodone-acetaminophen  •  influenza vaccine  •  melatonin  •  nitroglycerin  •  ondansetron **OR** ondansetron  •  potassium chloride **OR** potassium chloride **OR** potassium chloride  •  sodium chloride  •  sodium chloride     ALLERGIES:    Allergies   Allergen Reactions   • Hydromorphone GI Intolerance     dilaudid   • Morphine Nausea And Vomiting   • Propofol Hives     Previously tolerated being premedicated with diphenhydramine and famotadine       Objective    VITALS:   BP 94/60 (BP Location: Left arm, Patient Position: Lying)   Pulse 97   Temp 98.2 °F (36.8 °C) (Oral)   Resp 16   Ht 162.6 cm (64\")   Wt 62.2 kg (137 lb 3.2 oz)   LMP 05/25/2022 (Approximate)   SpO2 92%   BMI 23.55 kg/m²     PHYSICAL EXAM:     Physical Exam  Vitals and nursing note reviewed.   Constitutional:       General: She is not in acute distress.     Appearance: She is ill-appearing. She is not toxic-appearing or diaphoretic.      Comments: Pale, ill appearing, edematous. Oriented.    HENT:      Head: " Normocephalic and atraumatic.      Right Ear: External ear normal.      Left Ear: External ear normal.      Nose: Nose normal.      Mouth/Throat:      Mouth: Mucous membranes are moist.      Pharynx: Oropharynx is clear. No oropharyngeal exudate or posterior oropharyngeal erythema.      Comments: Two large ulcerations in the mouth, one on the left buccal mucosa and the second on the mucosa of the inferior right lip. Hematic base for both of them (trauma?)  Eyes:      General: No scleral icterus.        Right eye: No discharge.         Left eye: No discharge.      Conjunctiva/sclera: Conjunctivae normal.      Pupils: Pupils are equal, round, and reactive to light.   Neck:      Thyroid: No thyromegaly.   Cardiovascular:      Rate and Rhythm: Normal rate and regular rhythm.      Pulses: Normal pulses.      Heart sounds: Normal heart sounds. No murmur heard.    No friction rub. No gallop.   Pulmonary:      Effort: Pulmonary effort is normal. No respiratory distress.      Breath sounds: No stridor. No wheezing, rhonchi or rales.   Abdominal:      General: Bowel sounds are normal. There is no distension.      Palpations: Abdomen is soft. There is no mass.      Tenderness: There is no abdominal tenderness. There is no right CVA tenderness, left CVA tenderness, guarding or rebound.      Hernia: No hernia is present.      Comments: Rounded, protuberant and soft. The spleen seems enlarged.    Musculoskeletal:         General: No swelling, tenderness, deformity or signs of injury. Normal range of motion.      Cervical back: Normal range of motion and neck supple. No rigidity.      Right lower leg: No edema.      Left lower leg: No edema.      Comments: Diffuse peripheral edema.    Lymphadenopathy:      Cervical: No cervical adenopathy.   Skin:     General: Skin is warm.      Coloration: Skin is not jaundiced.      Findings: No bruising, erythema, lesion or rash.   Neurological:      General: No focal deficit present.       Mental Status: She is alert and oriented to person, place, and time.      Cranial Nerves: No cranial nerve deficit.      Motor: No weakness or abnormal muscle tone.      Gait: Gait normal.   Psychiatric:         Mood and Affect: Mood normal.         Behavior: Behavior normal.         Thought Content: Thought content normal.         Judgment: Judgment normal.     I have reexamined the patient and the results are consistent with the previously documented exam. Meghann Lerma MD     RECENT LABS:    Lab Results (last 24 hours)     Procedure Component Value Units Date/Time    Blood Culture - Blood, Arm, Left [[id number]]  (Normal) Collected: 11/05/22 0901    Specimen: Blood from Arm, Left Updated: 11/10/22 0815     Blood Culture No growth at 5 days    POC Glucose Once [[id number]]  (Abnormal) Collected: 11/10/22 0757    Specimen: Blood Updated: 11/10/22 0759     Glucose 163 mg/dL      Comment: Serial Number: [id number]:  [id number]       Manual Differential [[id number]]  (Abnormal) Collected: 11/10/22 0343    Specimen: Blood Updated: 11/10/22 0529     Neutrophil % 19.0 %      Lymphocyte % 5.0 %      Monocyte % 1.0 %      Eosinophil % 5.0 %      Basophil % 16.0 %      Bands %  14.0 %      Metamyelocyte % 9.0 %      Myelocyte % 6.0 %      Blasts % 25.0 %      Neutrophils Absolute 89.66 10*3/mm3      Lymphocytes Absolute 13.59 10*3/mm3      Monocytes Absolute 2.72 10*3/mm3      Eosinophils Absolute 13.59 10*3/mm3      Basophils Absolute 43.47 10*3/mm3      Anisocytosis Slight/1+     WBC Morphology Normal     Large Platelets Slight/1+    Narrative:      Reviewed by Pathologist within the past 30 days on 10.31.22 .      CBC & Differential [[id number]]  (Abnormal) Collected: 11/10/22 0343    Specimen: Blood Updated: 11/10/22 0529    Narrative:      The following orders were created for panel order CBC & Differential.  Procedure                               Abnormality         Status                     ---------                                -----------         ------                     CBC Auto Differential[[id number]]        Abnormal            Final result               Scan Slide[[id number]]                                       Final result                 Please view results for these tests on the individual orders.    Scan Slide [[id number]] Collected: 11/10/22 0343    Specimen: Blood Updated: 11/10/22 0529     Scan Slide --     Comment: See Manual Differential Results       CBC Auto Differential [[id number]]  (Abnormal) Collected: 11/10/22 0343    Specimen: Blood Updated: 11/10/22 0529     .70 10*3/mm3      RBC 3.77 10*6/mm3      Hemoglobin 9.1 g/dL      Hematocrit 30.2 %      MCV 80.3 fL      MCH 24.1 pg      MCHC 30.0 g/dL      RDW 18.8 %      RDW-SD 51.6 fl      MPV 8.2 fL      Platelets 298 10*3/mm3     Narrative:      Modified report. Previous result was Hemogram on 11/10/2022 at 0509 EST.  The previously reported component NRBC is no longer being reported. Previous result was 0.1 /100 WBC (Reference Range: 0.0-0.2 /100 WBC) on 11/10/2022 at 0509 EST.    Basic Metabolic Panel [[id number]]  (Abnormal) Collected: 11/10/22 0343    Specimen: Blood Updated: 11/10/22 0512     Glucose 161 mg/dL      BUN 40 mg/dL      Creatinine 1.52 mg/dL      Sodium 133 mmol/L      Potassium 5.2 mmol/L      Chloride 96 mmol/L      CO2 25.0 mmol/L      Calcium 8.2 mg/dL      BUN/Creatinine Ratio 26.3     Anion Gap 12.0 mmol/L      eGFR 42.4 mL/min/1.73      Comment: National Kidney Foundation and American Society of Nephrology (ASN) Task Force recommended calculation based on the Chronic Kidney Disease Epidemiology Collaboration (CKD-EPI) equation refit without adjustment for race.       Narrative:      GFR Normal >60  Chronic Kidney Disease <60  Kidney Failure <15      BCR / ABL By RT-PCR [[id number]] Collected: 11/03/22 1643    Specimen: Blood Updated: 11/09/22 1808     BCR ABL Result Comment^Text^TXT     Comment: Positive for  BCR-ABL1 transcripts, b3a2 and b2a2 (p210)  DISCLAIMER: REFER TO HARDCOPY OR PDF FOR COMPLETE RESULT.   If synopsis provided, clinical decisions should not be   based on this interfaced synopsis alone.  Performed at:  1 - [facility]  [address] [address]  :  Molly Vega M.D., Ph.D., Phone:  [phone]       Narrative:      Performed at:  1 - [facility]  [address], [place] [address]  :  Molly Vega M.D., Ph.D., Phone:  [phone]    POC Glucose Once [[id number]]  (Abnormal) Collected: 11/09/22 1719    Specimen: Blood Updated: 11/09/22 1720     Glucose 333 mg/dL      Comment: Serial Number: [id number]:  [id number]       POC Glucose Once [[id number]]  (Abnormal) Collected: 11/09/22 1136    Specimen: Blood Updated: 11/09/22 1137     Glucose 387 mg/dL      Comment: Serial Number: [id number]:  [id number]       Manual Differential [[id number]]  (Abnormal) Collected: 11/09/22 0840    Specimen: Blood Updated: 11/09/22 1118     Neutrophil % 13.0 %      Lymphocyte % 2.0 %      Eosinophil % 9.0 %      Basophil % 13.0 %      Bands %  3.0 %      Metamyelocyte % 3.0 %      Myelocyte % 3.0 %      Promyelocyte % 11.0 %      Blasts % 43.0 %      Neutrophils Absolute 49.23 10*3/mm3      Lymphocytes Absolute 6.15 10*3/mm3      Eosinophils Absolute 27.69 10*3/mm3      Basophils Absolute 40.00 10*3/mm3      Anisocytosis Mod/2+     Microcytes Slight/1+     Poikilocytes Slight/1+     Toxic Granulation Slight/1+     Platelet Estimate Adequate    Narrative:      Reviewed by Pathologist within the past 30 days on 10/31/2022.     CBC & Differential [[id number]]  (Abnormal) Collected: 11/09/22 0840    Specimen: Blood Updated: 11/09/22 1118    Narrative:      The following orders were created for panel order CBC & Differential.  Procedure                               Abnormality         Status                      ---------                               -----------         ------                     CBC Auto Differential[[id number]]        Abnormal            Final result               Scan Slide[[id number]]                                       Final result                 Please view results for these tests on the individual orders.    Scan Slide [[id number]] Collected: 11/09/22 0840    Specimen: Blood Updated: 11/09/22 1118     Scan Slide --     Comment: See Manual Differential Results       CBC Auto Differential [[id number]]  (Abnormal) Collected: 11/09/22 0840    Specimen: Blood Updated: 11/09/22 1118     .70 10*3/mm3      RBC 4.19 10*6/mm3      Hemoglobin 10.0 g/dL      Hematocrit 33.5 %      MCV 80.0 fL      MCH 24.0 pg      MCHC 30.0 g/dL      RDW 18.5 %      RDW-SD 50.8 fl      MPV 8.3 fL      Platelets 355 10*3/mm3     Narrative:      Modified report. Previous result was Hemogram on 11/9/2022 at 0934 EST.  The previously reported component NRBC is no longer being reported. Previous result was 0.1 /100 WBC (Reference Range: 0.0-0.2 /100 WBC) on 11/9/2022 at 0934 EST.    Potassium [[id number]]  (Normal) Collected: 11/09/22 0840    Specimen: Blood Updated: 11/09/22 0935     Potassium 5.1 mmol/L           PENDING RESULTS: Flow cytometry    IMAGING REVIEWED:  No radiology results for the last day    Assessment & Plan      ASSESSMENT:    Hematology/Oncology was consulted on a patient known to us and followed in the office by Dr. Lerma for her diagnosis of CML currently on imatinib and hydroxyurea.  Patient has been very noncompliant with treatments.  She is currently on Gleevec 400 mg daily but this was recently increased to 600 mg daily.  She is also supposed to be on hydroxyurea twice a day.  She has been noncompliant with medication intake and also routine follow-up in the office for ongoing care.  Patient has had progressive shortness of breath and swelling on her lower extremities.    1. CML not in  remission: Currently on imatinib with plan to increase dose to 600 mg daily once medication is available.  Hydroxyurea is currently on hold. Patient has been noncompliant with her medicines in the past.  Continue Gleevec 600 mg p.o. daily  2. Oral lesion likely leukemic cells improving on Gleevec  3. Anemia: Secondary to CML and TKI, hydroxyurea.  Her hemoglobin has improved on treatment to 9.2 g per DL  4. Hyperleukocytosis: Secondary to CML, beginning to improve  5. History of pulmonary embolism: On Xarelto as an outpatient.  Lovenox currently.  6. Acute on chronic diastolic heart failure: Management per primary team and cardiology.  7. Multiple chronic conditions: Type 2 diabetes mellitus, hypertension, depression with anxiety.     PLANS:  1. Continue Daily CBCs  2. Reviewed results of flow cytometry  3. Continue Gleevec to 600 mg p.o. daily  4. She has been seen by psychiatrist and currently on Celexa 10 mg daily.  Remeron increased to 30 mg at night.  Outpatient counseling has been recommended  5. Discussed with patient and nursing      Electronically signed by Meghann Lerma MD, 11/10/22, 10:46 AM EST.

## 2022-11-10 NOTE — PLAN OF CARE
Problem: Adult Inpatient Plan of Care  Goal: Absence of Hospital-Acquired Illness or Injury  Intervention: Identify and Manage Fall Risk  Recent Flowsheet Documentation  Taken 11/10/2022 0215 by Hang Livingston RN  Safety Promotion/Fall Prevention:   safety round/check completed   room organization consistent   nonskid shoes/slippers when out of bed   fall prevention program maintained   clutter free environment maintained   assistive device/personal items within reach  Taken 11/10/2022 0025 by Hang Livingston RN  Safety Promotion/Fall Prevention:   safety round/check completed   room organization consistent   nonskid shoes/slippers when out of bed   fall prevention program maintained   clutter free environment maintained   assistive device/personal items within reach  Taken 11/9/2022 2224 by Hang Livingston RN  Safety Promotion/Fall Prevention:   safety round/check completed   room organization consistent   nonskid shoes/slippers when out of bed   fall prevention program maintained   clutter free environment maintained   assistive device/personal items within reach  Taken 11/9/2022 2035 by Hang Livingston RN  Safety Promotion/Fall Prevention:   safety round/check completed   room organization consistent   nonskid shoes/slippers when out of bed   fall prevention program maintained   clutter free environment maintained  Intervention: Prevent Skin Injury  Recent Flowsheet Documentation  Taken 11/10/2022 0215 by Hang Livingston RN  Body Position: position changed independently  Taken 11/10/2022 0025 by Hang Livingston RN  Body Position: position changed independently  Taken 11/9/2022 2224 by Hang Livingston RN  Body Position: position changed independently  Taken 11/9/2022 2035 by Hang Livingston RN  Body Position: position changed independently  Skin Protection: adhesive use limited  Intervention: Prevent and Manage VTE (Venous Thromboembolism) Risk  Recent Flowsheet Documentation  Taken 11/9/2022  2035 by Hang Livingston RN  VTE Prevention/Management: sequential compression devices off  Range of Motion:   active ROM (range of motion) encouraged   ROM (range of motion) performed  Intervention: Prevent Infection  Recent Flowsheet Documentation  Taken 11/10/2022 0215 by Hang Livingston RN  Infection Prevention:   environmental surveillance performed   equipment surfaces disinfected   hand hygiene promoted   personal protective equipment utilized   rest/sleep promoted   single patient room provided   visitors restricted/screened  Taken 11/10/2022 0025 by Hang Livingston RN  Infection Prevention:   environmental surveillance performed   equipment surfaces disinfected   hand hygiene promoted   personal protective equipment utilized   rest/sleep promoted   single patient room provided   visitors restricted/screened  Taken 11/9/2022 2224 by Hang Livingston RN  Infection Prevention:   environmental surveillance performed   equipment surfaces disinfected   hand hygiene promoted   personal protective equipment utilized   rest/sleep promoted   single patient room provided   visitors restricted/screened  Taken 11/9/2022 2035 by Hang Livingston RN  Infection Prevention:   environmental surveillance performed   equipment surfaces disinfected   hand hygiene promoted   personal protective equipment utilized   rest/sleep promoted   single patient room provided   visitors restricted/screened  Goal: Optimal Comfort and Wellbeing  Intervention: Monitor Pain and Promote Comfort  Recent Flowsheet Documentation  Taken 11/10/2022 0025 by Hang Livingston RN  Pain Management Interventions: see MAR  Taken 11/9/2022 2035 by Hang Livingston RN  Pain Management Interventions: see MAR  Intervention: Provide Person-Centered Care  Recent Flowsheet Documentation  Taken 11/9/2022 2035 by Hang Livingston RN  Trust Relationship/Rapport: care explained   Goal Outcome Evaluation:

## 2022-11-11 LAB
ALBUMIN SERPL-MCNC: 3.3 G/DL (ref 3.5–5.2)
ALBUMIN/GLOB SERPL: 1 G/DL
ALP SERPL-CCNC: 1532 U/L (ref 39–117)
ALT SERPL W P-5'-P-CCNC: 43 U/L (ref 1–33)
ANION GAP SERPL CALCULATED.3IONS-SCNC: 11 MMOL/L (ref 5–15)
ANISOCYTOSIS BLD QL: ABNORMAL
ANISOCYTOSIS BLD QL: ABNORMAL
AST SERPL-CCNC: 59 U/L (ref 1–32)
BASOPHILS # BLD MANUAL: 41.83 10*3/MM3 (ref 0–0.2)
BASOPHILS # BLD MANUAL: 5.67 10*3/MM3 (ref 0–0.2)
BASOPHILS NFR BLD MANUAL: 14 % (ref 0–1.5)
BASOPHILS NFR BLD MANUAL: 2 % (ref 0–1.5)
BILIRUB SERPL-MCNC: 0.7 MG/DL (ref 0–1.2)
BILIRUB UR QL STRIP: NEGATIVE
BLASTS NFR BLD MANUAL: 12 % (ref 0–0)
BLASTS NFR BLD MANUAL: 22 % (ref 0–0)
BUN SERPL-MCNC: 51 MG/DL (ref 6–20)
BUN SERPL-MCNC: 53 MG/DL (ref 6–20)
BUN SERPL-MCNC: 58 MG/DL (ref 6–20)
BUN/CREAT SERPL: 29.9 (ref 7–25)
BUN/CREAT SERPL: 32.3 (ref 7–25)
BUN/CREAT SERPL: 33.5 (ref 7–25)
CALCIUM SPEC-SCNC: 8.4 MG/DL (ref 8.6–10.5)
CALCIUM SPEC-SCNC: 8.4 MG/DL (ref 8.6–10.5)
CALCIUM SPEC-SCNC: 8.7 MG/DL (ref 8.6–10.5)
CCV RESULT: NORMAL
CHLORIDE SERPL-SCNC: 97 MMOL/L (ref 98–107)
CHLORIDE SERPL-SCNC: 97 MMOL/L (ref 98–107)
CHLORIDE SERPL-SCNC: 99 MMOL/L (ref 98–107)
CLARITY UR: CLEAR
CO2 SERPL-SCNC: 23 MMOL/L (ref 22–29)
CO2 SERPL-SCNC: 25 MMOL/L (ref 22–29)
CO2 SERPL-SCNC: 25 MMOL/L (ref 22–29)
COLOR UR: YELLOW
CREAT SERPL-MCNC: 1.58 MG/DL (ref 0.57–1)
CREAT SERPL-MCNC: 1.58 MG/DL (ref 0.57–1)
CREAT SERPL-MCNC: 1.94 MG/DL (ref 0.57–1)
CREAT UR-MCNC: 22.5 MG/DL
DACRYOCYTES BLD QL SMEAR: ABNORMAL
DACRYOCYTES BLD QL SMEAR: ABNORMAL
DEPRECATED RDW RBC AUTO: 51.6 FL (ref 37–54)
DEPRECATED RDW RBC AUTO: 56 FL (ref 37–54)
EGFRCR SERPLBLD CKD-EPI 2021: 31.6 ML/MIN/1.73
EGFRCR SERPLBLD CKD-EPI 2021: 40.5 ML/MIN/1.73
EGFRCR SERPLBLD CKD-EPI 2021: 40.5 ML/MIN/1.73
EOSINOPHIL # BLD MANUAL: 19.86 10*3/MM3 (ref 0–0.4)
EOSINOPHIL # BLD MANUAL: 23.9 10*3/MM3 (ref 0–0.4)
EOSINOPHIL NFR BLD MANUAL: 7 % (ref 0.3–6.2)
EOSINOPHIL NFR BLD MANUAL: 8 % (ref 0.3–6.2)
ERYTHROCYTE [DISTWIDTH] IN BLOOD BY AUTOMATED COUNT: 18.9 % (ref 12.3–15.4)
ERYTHROCYTE [DISTWIDTH] IN BLOOD BY AUTOMATED COUNT: 19.6 % (ref 12.3–15.4)
GLOBULIN UR ELPH-MCNC: 3.3 GM/DL
GLUCOSE BLDC GLUCOMTR-MCNC: 114 MG/DL (ref 70–105)
GLUCOSE BLDC GLUCOMTR-MCNC: 174 MG/DL (ref 70–105)
GLUCOSE BLDC GLUCOMTR-MCNC: 220 MG/DL (ref 70–105)
GLUCOSE BLDC GLUCOMTR-MCNC: 229 MG/DL (ref 70–105)
GLUCOSE BLDC GLUCOMTR-MCNC: 306 MG/DL (ref 70–105)
GLUCOSE SERPL-MCNC: 129 MG/DL (ref 65–99)
GLUCOSE SERPL-MCNC: 259 MG/DL (ref 65–99)
GLUCOSE SERPL-MCNC: 320 MG/DL (ref 65–99)
GLUCOSE UR STRIP-MCNC: NEGATIVE MG/DL
HCT VFR BLD AUTO: 30 % (ref 34–46.6)
HCT VFR BLD AUTO: 30.6 % (ref 34–46.6)
HGB BLD-MCNC: 8.5 G/DL (ref 12–15.9)
HGB BLD-MCNC: 9 G/DL (ref 12–15.9)
HGB UR QL STRIP.AUTO: NEGATIVE
HYPOCHROMIA BLD QL: ABNORMAL
KETONES UR QL STRIP: NEGATIVE
LARGE PLATELETS: ABNORMAL
LARGE PLATELETS: ABNORMAL
LEUKOCYTE ESTERASE UR QL STRIP.AUTO: NEGATIVE
LYMPHOCYTES # BLD MANUAL: 20.92 10*3/MM3 (ref 0.7–3.1)
LYMPHOCYTES # BLD MANUAL: 8.51 10*3/MM3 (ref 0.7–3.1)
LYMPHOCYTES NFR BLD MANUAL: 2 % (ref 5–12)
MCH RBC QN AUTO: 22.3 PG (ref 26.6–33)
MCH RBC QN AUTO: 23.9 PG (ref 26.6–33)
MCHC RBC AUTO-ENTMCNC: 28.3 G/DL (ref 31.5–35.7)
MCHC RBC AUTO-ENTMCNC: 29.5 G/DL (ref 31.5–35.7)
MCV RBC AUTO: 78.9 FL (ref 79–97)
MCV RBC AUTO: 80.9 FL (ref 79–97)
METAMYELOCYTES NFR BLD MANUAL: 11 % (ref 0–0)
METAMYELOCYTES NFR BLD MANUAL: 7 % (ref 0–0)
MICROCYTES BLD QL: ABNORMAL
MONOCYTES # BLD: 5.67 10*3/MM3 (ref 0.1–0.9)
MYELOCYTES NFR BLD MANUAL: 5 % (ref 0–0)
MYELOCYTES NFR BLD MANUAL: 9 % (ref 0–0)
NEUTROPHILS # BLD AUTO: 134.46 10*3/MM3 (ref 1.7–7)
NEUTROPHILS # BLD AUTO: 93.62 10*3/MM3 (ref 1.7–7)
NEUTROPHILS NFR BLD MANUAL: 24 % (ref 42.7–76)
NEUTROPHILS NFR BLD MANUAL: 40 % (ref 42.7–76)
NEUTS BAND NFR BLD MANUAL: 5 % (ref 0–5)
NEUTS BAND NFR BLD MANUAL: 9 % (ref 0–5)
NITRITE UR QL STRIP: NEGATIVE
PH UR STRIP.AUTO: 7 [PH] (ref 5–8)
PHOSPHATE SERPL-MCNC: 6.2 MG/DL (ref 2.5–4.5)
PLATELET # BLD AUTO: 339 10*3/MM3 (ref 140–450)
PLATELET # BLD AUTO: 340 10*3/MM3 (ref 140–450)
PMV BLD AUTO: 8.3 FL (ref 6–12)
PMV BLD AUTO: 8.5 FL (ref 6–12)
POIKILOCYTOSIS BLD QL SMEAR: ABNORMAL
POIKILOCYTOSIS BLD QL SMEAR: ABNORMAL
POLYCHROMASIA BLD QL SMEAR: ABNORMAL
POTASSIUM SERPL-SCNC: 4.9 MMOL/L (ref 3.5–5.2)
POTASSIUM SERPL-SCNC: 5.9 MMOL/L (ref 3.5–5.2)
POTASSIUM SERPL-SCNC: 6.1 MMOL/L (ref 3.5–5.2)
POTASSIUM SERPL-SCNC: 6.2 MMOL/L (ref 3.5–5.2)
PROMYELOCYTES NFR BLD MANUAL: 11 % (ref 0–0)
PROMYELOCYTES NFR BLD MANUAL: 2 % (ref 0–0)
PROT SERPL-MCNC: 6.6 G/DL (ref 6–8.5)
PROT UR QL STRIP: NEGATIVE
RBC # BLD AUTO: 3.78 10*6/MM3 (ref 3.77–5.28)
RBC # BLD AUTO: 3.8 10*6/MM3 (ref 3.77–5.28)
SCAN SLIDE: NORMAL
SCAN SLIDE: NORMAL
SMALL PLATELETS BLD QL SMEAR: ADEQUATE
SMALL PLATELETS BLD QL SMEAR: ADEQUATE
SMUDGE CELLS BLD QL SMEAR: ABNORMAL
SODIUM SERPL-SCNC: 133 MMOL/L (ref 136–145)
SODIUM UR-SCNC: 109 MMOL/L
SP GR UR STRIP: 1.01 (ref 1–1.03)
URATE SERPL-MCNC: 9.5 MG/DL (ref 2.4–5.7)
URATE SERPL-MCNC: 9.6 MG/DL (ref 2.4–5.7)
UROBILINOGEN UR QL STRIP: NORMAL
VARIANT LYMPHS NFR BLD MANUAL: 1 % (ref 0–5)
VARIANT LYMPHS NFR BLD MANUAL: 3 % (ref 19.6–45.3)
VARIANT LYMPHS NFR BLD MANUAL: 6 % (ref 19.6–45.3)
WBC MORPH BLD: NORMAL
WBC NRBC COR # BLD: 283.7 10*3/MM3 (ref 3.4–10.8)
WBC NRBC COR # BLD: 298.8 10*3/MM3 (ref 3.4–10.8)

## 2022-11-11 PROCEDURE — 25010000002 CALCIUM GLUCONATE-NACL 1-0.675 GM/50ML-% SOLUTION: Performed by: NURSE PRACTITIONER

## 2022-11-11 PROCEDURE — 94761 N-INVAS EAR/PLS OXIMETRY MLT: CPT

## 2022-11-11 PROCEDURE — 93005 ELECTROCARDIOGRAM TRACING: CPT | Performed by: NURSE PRACTITIONER

## 2022-11-11 PROCEDURE — 93010 ELECTROCARDIOGRAM REPORT: CPT | Performed by: INTERNAL MEDICINE

## 2022-11-11 PROCEDURE — 99233 SBSQ HOSP IP/OBS HIGH 50: CPT | Performed by: INTERNAL MEDICINE

## 2022-11-11 PROCEDURE — 85007 BL SMEAR W/DIFF WBC COUNT: CPT | Performed by: NURSE PRACTITIONER

## 2022-11-11 PROCEDURE — 84132 ASSAY OF SERUM POTASSIUM: CPT | Performed by: INTERNAL MEDICINE

## 2022-11-11 PROCEDURE — 63710000001 INSULIN REGULAR HUMAN PER 5 UNITS: Performed by: NURSE PRACTITIONER

## 2022-11-11 PROCEDURE — 63710000001 INSULIN GLARGINE PER 5 UNITS: Performed by: NURSE PRACTITIONER

## 2022-11-11 PROCEDURE — 25010000002 ENOXAPARIN PER 10 MG: Performed by: NURSE PRACTITIONER

## 2022-11-11 PROCEDURE — 94664 DEMO&/EVAL PT USE INHALER: CPT

## 2022-11-11 PROCEDURE — 63710000001 INSULIN LISPRO (HUMAN) PER 5 UNITS: Performed by: NURSE PRACTITIONER

## 2022-11-11 PROCEDURE — 84100 ASSAY OF PHOSPHORUS: CPT | Performed by: INTERNAL MEDICINE

## 2022-11-11 PROCEDURE — 85025 COMPLETE CBC W/AUTO DIFF WBC: CPT | Performed by: NURSE PRACTITIONER

## 2022-11-11 PROCEDURE — 94799 UNLISTED PULMONARY SVC/PX: CPT

## 2022-11-11 PROCEDURE — 80053 COMPREHEN METABOLIC PANEL: CPT | Performed by: NURSE PRACTITIONER

## 2022-11-11 PROCEDURE — 82570 ASSAY OF URINE CREATININE: CPT | Performed by: INTERNAL MEDICINE

## 2022-11-11 PROCEDURE — 84550 ASSAY OF BLOOD/URIC ACID: CPT | Performed by: INTERNAL MEDICINE

## 2022-11-11 PROCEDURE — 82962 GLUCOSE BLOOD TEST: CPT

## 2022-11-11 PROCEDURE — 85007 BL SMEAR W/DIFF WBC COUNT: CPT | Performed by: INTERNAL MEDICINE

## 2022-11-11 PROCEDURE — 84300 ASSAY OF URINE SODIUM: CPT | Performed by: INTERNAL MEDICINE

## 2022-11-11 PROCEDURE — 85025 COMPLETE CBC W/AUTO DIFF WBC: CPT | Performed by: INTERNAL MEDICINE

## 2022-11-11 PROCEDURE — 81003 URINALYSIS AUTO W/O SCOPE: CPT | Performed by: INTERNAL MEDICINE

## 2022-11-11 RX ORDER — FUROSEMIDE 20 MG/1
20 TABLET ORAL DAILY
Status: DISCONTINUED | OUTPATIENT
Start: 2022-11-11 | End: 2022-11-11

## 2022-11-11 RX ORDER — CALCIUM GLUCONATE 20 MG/ML
1 INJECTION, SOLUTION INTRAVENOUS ONCE
Status: COMPLETED | OUTPATIENT
Start: 2022-11-11 | End: 2022-11-11

## 2022-11-11 RX ORDER — HYDROCODONE BITARTRATE AND ACETAMINOPHEN 7.5; 325 MG/1; MG/1
1 TABLET ORAL EVERY 6 HOURS PRN
Status: DISPENSED | OUTPATIENT
Start: 2022-11-11 | End: 2022-11-14

## 2022-11-11 RX ORDER — DEXTROSE MONOHYDRATE 25 G/50ML
50 INJECTION, SOLUTION INTRAVENOUS ONCE
Status: COMPLETED | OUTPATIENT
Start: 2022-11-11 | End: 2022-11-11

## 2022-11-11 RX ADMIN — ALPRAZOLAM 0.5 MG: 0.5 TABLET ORAL at 21:44

## 2022-11-11 RX ADMIN — INSULIN GLARGINE 30 UNITS: 100 INJECTION, SOLUTION SUBCUTANEOUS at 08:36

## 2022-11-11 RX ADMIN — SODIUM BICARBONATE 50 MEQ: 84 INJECTION, SOLUTION INTRAVENOUS at 03:11

## 2022-11-11 RX ADMIN — GUAIFENESIN 600 MG: 600 TABLET, EXTENDED RELEASE ORAL at 21:21

## 2022-11-11 RX ADMIN — INSULIN LISPRO 7 UNITS: 100 INJECTION, SOLUTION INTRAVENOUS; SUBCUTANEOUS at 17:59

## 2022-11-11 RX ADMIN — IMATINIB MESYLATE 400 MG: 400 TABLET, FILM COATED ORAL at 08:39

## 2022-11-11 RX ADMIN — SODIUM ZIRCONIUM CYCLOSILICATE 10 G: 10 POWDER, FOR SUSPENSION ORAL at 02:50

## 2022-11-11 RX ADMIN — Medication 10 ML: at 21:21

## 2022-11-11 RX ADMIN — CITALOPRAM HYDROBROMIDE 10 MG: 20 TABLET ORAL at 08:35

## 2022-11-11 RX ADMIN — BUDESONIDE AND FORMOTEROL FUMARATE DIHYDRATE 2 PUFF: 160; 4.5 AEROSOL RESPIRATORY (INHALATION) at 18:09

## 2022-11-11 RX ADMIN — GUAIFENESIN 600 MG: 600 TABLET, EXTENDED RELEASE ORAL at 08:35

## 2022-11-11 RX ADMIN — ALLOPURINOL 100 MG: 100 TABLET ORAL at 21:21

## 2022-11-11 RX ADMIN — SODIUM ZIRCONIUM CYCLOSILICATE 6 G: 10 POWDER, FOR SUSPENSION ORAL at 21:19

## 2022-11-11 RX ADMIN — HYDROCODONE BITARTRATE AND ACETAMINOPHEN 1 TABLET: 7.5; 325 TABLET ORAL at 12:44

## 2022-11-11 RX ADMIN — MIRTAZAPINE 30 MG: 15 TABLET, FILM COATED ORAL at 21:21

## 2022-11-11 RX ADMIN — INSULIN LISPRO 10 UNITS: 100 INJECTION, SOLUTION INTRAVENOUS; SUBCUTANEOUS at 12:40

## 2022-11-11 RX ADMIN — FUROSEMIDE 20 MG: 40 TABLET ORAL at 08:35

## 2022-11-11 RX ADMIN — ALLOPURINOL 100 MG: 100 TABLET ORAL at 13:28

## 2022-11-11 RX ADMIN — DEXTROSE MONOHYDRATE 50 ML: 25 INJECTION, SOLUTION INTRAVENOUS at 03:43

## 2022-11-11 RX ADMIN — ALLOPURINOL 100 MG: 100 TABLET ORAL at 05:49

## 2022-11-11 RX ADMIN — HYDROCODONE BITARTRATE AND ACETAMINOPHEN 1 TABLET: 7.5; 325 TABLET ORAL at 02:52

## 2022-11-11 RX ADMIN — FOLIC ACID 1 MG: 1 TABLET ORAL at 08:35

## 2022-11-11 RX ADMIN — CALCIUM GLUCONATE 1 G: 20 INJECTION, SOLUTION INTRAVENOUS at 02:51

## 2022-11-11 RX ADMIN — BUDESONIDE AND FORMOTEROL FUMARATE DIHYDRATE 2 PUFF: 160; 4.5 AEROSOL RESPIRATORY (INHALATION) at 07:41

## 2022-11-11 RX ADMIN — ASPIRIN 325 MG: 325 TABLET, COATED ORAL at 08:36

## 2022-11-11 RX ADMIN — INSULIN HUMAN 10 UNITS: 100 INJECTION, SOLUTION PARENTERAL at 03:00

## 2022-11-11 RX ADMIN — Medication 10 ML: at 08:43

## 2022-11-11 RX ADMIN — INSULIN LISPRO 10 UNITS: 100 INJECTION, SOLUTION INTRAVENOUS; SUBCUTANEOUS at 08:36

## 2022-11-11 RX ADMIN — IMATINIB MESYLATE 200 MG: 100 TABLET, FILM COATED ORAL at 08:39

## 2022-11-11 RX ADMIN — ENOXAPARIN SODIUM 40 MG: 100 INJECTION SUBCUTANEOUS at 16:57

## 2022-11-11 RX ADMIN — INSULIN LISPRO 10 UNITS: 100 INJECTION, SOLUTION INTRAVENOUS; SUBCUTANEOUS at 17:59

## 2022-11-11 RX ADMIN — SODIUM ZIRCONIUM CYCLOSILICATE 6 G: 10 POWDER, FOR SUSPENSION ORAL at 13:26

## 2022-11-11 RX ADMIN — HYDROCODONE BITARTRATE AND ACETAMINOPHEN 1 TABLET: 7.5; 325 TABLET ORAL at 21:44

## 2022-11-11 NOTE — PROGRESS NOTES
[facility] Medicine Services Daily Progress Note    Patient Name: Nieves Mc  : 1975  MRN: [id number]  Primary Care Physician:  Alida Latham APRN  Date of admission: 11/3/2022      Subjective          Brief interim history: 47-year-old female with nonischemic cardiomyopathy, HFpEF, PE, anxiety, T2DM, history of medical noncompliance, depression/anxiety and CML. She was admitted on 11/3/2022, presented to [facility] ED, complaining of chest pain that started at around 9 AM while she was at home with her .  Chest pain is described as dull in nature, radiating to the left with paresthesia.  Cardiac biomarkers with troponin negative x3 and EKG shows sinus tachycardia.  Laboratories notable for proBNP of 54[address] and chest x-ray showed cardiomegaly. Patient is admitted with atypical chest pain chest pain, rule out for ACS.  Seen in consultation by cardiologist with recommendation and followed by oncologist for CML.     2022: Seen and examined in follow evaluation.  Chest pain-free and resting comfortably in bed.     2022: Seen and examined in follow-up.  Feels and looks better this morning.  No complaints or event.    2022: Seen and examined in follow-up.  Feels slightly better but still dyspneic with activity.  Underwent NST today with result pending      2022  Patient seen and examined  Reported feeling generally weak otherwise no new complaints  Initial plan was to discharge patient today however cardiologist wants to keep patient as she still has bilateral pedal edema  Furosemide frequency was increased and metolazone added    2022  Patient seen and examined  Continues with generalized body weakness  Pedal edema with no significant change despite increased diuretic frequency.  We will continue to monitor    2022  Patient seen and examined  Has no new complaint  Furosemide was increased to 40 mg twice daily, metolazone and Entresto added by  cardiology.  Patient with acute renal injury-most likely due to diuretics  Also noted to have hyperkalemia-due to acute kidney injury and Entresto use  Was given Lokelma  With decreased diuretic to daily and hold metolazone    11/10/2022  Patient seen and examined  No new complaint  Bilateral lower extremity swelling improving   Still on creatinine now stabilized  Change furosemide to 40 mg daily  BMP in the a.m.    11/11/2022  Patient seen and examined  Bilateral lower extremity swelling has improved  Serum creatinine worsening  Furosemide and Entresto discontinued  Nephrologist consulted  BMP in the a.m.      Objective      Vitals:   Temp:  [97.9 °F (36.6 °C)-98.5 °F (36.9 °C)] 98 °F (36.7 °C)  Heart Rate:  [] 91  Resp:  [16-20] 16  BP: ()/(56-70) 102/58    Physical Exam  Vitals reviewed.   Constitutional:       Comments: Chronically ill looking   HENT:      Head: Normocephalic.      Nose: Nose normal.      Mouth/Throat:      Mouth: Mucous membranes are moist.   Eyes:      Extraocular Movements: Extraocular movements intact.      Conjunctiva/sclera: Conjunctivae normal.      Pupils: Pupils are equal, round, and reactive to light.   Cardiovascular:      Rate and Rhythm: Normal rate and regular rhythm.   Pulmonary:      Effort: No respiratory distress.   Abdominal:      General: Bowel sounds are normal.      Palpations: Abdomen is soft.      Tenderness: There is no abdominal tenderness.   Musculoskeletal:         General: Normal range of motion.      Cervical back: Neck supple.   Skin:     General: Skin is warm.   Neurological:      Mental Status: She is alert and oriented to person, place, and time.   Psychiatric:         Mood and Affect: Mood normal.          Result Review    Result Review:  I have personally reviewed the results from the time of this admission to 11/11/2022 13:14 EST and agree with these findings:  [x]  Laboratory  []  Microbiology  [x]  Radiology  [x]  EKG/Telemetry   [x]   Cardiology/Vascular   []  Pathology  []  Old records  []  Other:  Most notable findings include:       Assessment & Plan        albuterol, 10 mg, Nebulization, Once  allopurinol, 100 mg, Oral, Q8H  aspirin, 325 mg, Oral, Daily  budesonide-formoterol, 2 puff, Inhalation, BID - RT  carvedilol, 6.25 mg, Oral, BID With Meals  citalopram, 10 mg, Oral, Daily  enoxaparin, 40 mg, Subcutaneous, Q24H  fentaNYL, 1 patch, Transdermal, Q72H  First Mouthwash (Magic Mouthwash), 5 mL, Swish & Spit, Q4H  folic acid, 1 mg, Oral, Daily  guaiFENesin, 600 mg, Oral, Q12H  imatinib, 400 mg, Oral, Daily   And  imatinib, 200 mg, Oral, Daily  insulin glargine, 30 Units, Subcutaneous, Daily  insulin lispro, 10 Units, Subcutaneous, TID AC  insulin lispro, 2-9 Units, Subcutaneous, TID With Meals  mirtazapine, 30 mg, Oral, Nightly  sodium chloride, 250 mL, Intravenous, Once  sodium chloride, 10 mL, Intravenous, Q12H  sodium zirconium cyclosilicate, 6 g, Oral, BID             Active Hospital Problems:  Active Hospital Problems    Diagnosis    • **Chest pain, unspecified type    • Bilateral lower extremity edema    • Acute diastolic CHF (congestive heart failure) ([facility])    • NICM (nonischemic cardiomyopathy) ([facility])    • Type 2 diabetes mellitus with diabetic polyneuropathy, with long-term current use of insulin ([facility])    • Depression with anxiety    • Medically noncompliant    • History of pulmonary embolism    • CML (chronic myelocytic leukemia) ([facility])      Assessment/plan:      Chest pain (atypical)  MI ruled out with cardiac enzymes and EKG  Had stress test 11/6/2022-negative for ischemia  Was seen by cardiologist who recommended no further work-up  Supportive care     Acute on chronic  diastolic heart failure  Nonischemic cardiomyopathy  Left ventricular ejection fraction is normal (Calculated EF = 50%)--noted on stress test  On low-dose beta-blocker, losartan and diuretic  Still with pedal edema and shortness of breath with exertion  Furosemide  increased to 40 mg twice daily, Entresto and metolazone added  Patient with acute renal injury-most likely due to diuretics  Also noted to have hyperkalemia-due to acute kidney injury and Entresto use  Was given lokelma  With decreased diuretic to daily and hold metolazone  Monitor renal function    Acute kidney injury  Prerenal-most likely due to diuretic  Serum creatinine worsening  Discontinue diuretic and Entresto  Nephrologist consulted    Hyperkalemia-  Most likely due to acute kidney injury/Entresto use  Started on medical management with hyperkalemia protocol       History of pulmonary embolus        Diabetes mellitus type 2  Continue on insulin regimen  Monitor blood sugar and adjust insulin as needed        CML  On Gleevec/hydroxyurea  Oncologist following     Anxiety/depression  On Remeron/Celexa  Was seen by psychiatrist while inpatient     Generalized weakness  Due to underlying comorbidities  Was seen by physical therapy/Occupational Therapy-no skilled therapy needed             DVT prophylaxis:  Medical DVT prophylaxis orders are present.    CODE STATUS:    Level Of Support Discussed With: Patient  Code Status (Patient has no pulse and is not breathing): CPR (Attempt to Resuscitate)  Medical Interventions (Patient has pulse or is breathing): Full Support      Disposition:  I expect patient to be discharged     Electronically signed by Steve Lemos MD, 11/11/22, 13:14 EST.  [facility] Hospitalist Team

## 2022-11-11 NOTE — PROGRESS NOTES
Hematology/Oncology Inpatient Progress Note    PATIENT NAME: Nieves Mc  : 1975  MRN: [id number]    CHIEF COMPLAINT: Chest pain    HISTORY OF PRESENT ILLNESS:      Nieves Mc is a 47 y.o. female who presented to [facility] on 11/3/2022 with complaints of chest pain.  Past medical history significant for CML, depression with anxiety, PE, type 2 diabetes mellitus.  Patient reported that around 9 AM the day of presentation she began to have chest pain.  Stated it was accompanied by the loss of hearing in her left ear and left arm numbness.  She reported she has never experienced this previous.  Since being in the emergency department the chest pain has subsided.  She describes the chest pain as dull.  She did not take anything at home to try to alleviate it.  She reported chronic leg swelling but denied any recent weight gain.  She reported shortness of breath.  She reported chronic cough.  She denied any fever or chills.  She also complained of nausea, vomiting and diarrhea. She was most recently seen in the emergency department on 10/31/2022 for abdominal pain at the instruction of her oncologist.  A CT of the abdomen and pelvis was fairly unchanged from prior and showed severe generalized anasarca and severe hepatosplenomegaly.  A CT of the chest was performed to rule out pulmonary embolism which was also negative and therefore the patient was discharged home.     Evaluation in the ED showed a negative troponin less than 0.010, elevated proBNP at 29,519, glucose 411, normal kidney function, elevated alk phos 1826.  WBC elevated at 376.70, hemoglobin 7.8, MCV 82.8, platelets 404,000, ANC 48.97, blasts 66%.  EKG showed sinus tachycardia, chest x-ray showed cardiomegaly and mild pulmonary vascular congestion which is increased in comparison.  The patient received Lasix, regular insulin, Nitrostat and a normal saline 250 mL bolus in the ED.  She was admitted to the hospital for further  evaluation and treatment.     11/03/22  Hematology/Oncology was consulted on a patient known to us and followed in the office by Dr. Lerma for her diagnosis of CML currently on imatinib and hydroxyurea.  Patient was initially seen in consultation in October 2018 while she was inpatient at [facility] with CML.  At that time she was under the care of Dr. Roach and  and was managed on imatinib.  Patient had a repeat bone marrow aspiration and biopsy in December 2018 that was consistent with chronic myeloid leukemia.  BCR ABL positive, chronic phase.  ABL kinase mutational analysis negative.  Patient was initiated on Tasigna in February 2019 but this was discontinued in June 2022 due to diagnosis of cardiomyopathy.  At that time it was determined that the imatinib would be the safer treatment option and she was reinitiated on this.  Her course has been complicated due to noncompliance and difficulty tolerating TKI's.    Flow Cytometry 11/3/22 - increased neutrophilic cells, increased atypical myeloid blasts (13% of leukocytes), aberrant CD56 expression on granulocytes and monocytes, increased basophils (7% of leukocytes)     11/4/22: Imatinib 400 mg daily restarted     11/5/22: Continues Hydrea 2500 mg daily, Imatinib 400 mg daily, allopurinol 100 mg daily. Patient denies chest pain or SOB. Discussed with patient that WBC is 284.9 today. Patient relays that she started her home supply of imatinib yesterday at 400 mg.      She  has a past medical history of Bone pain, COVID-19 virus infection (01/12/2022), Diabetes mellitus (HCC), Extremity pain, Leg pain, Migraine, Pulmonary embolism (HCC), and Vision loss.     PCP: Alida Latham APRN    History of present illness was reviewed and is unchanged from the previous visit. 11/05/22      Subjective      She denies any new issues today      ROS:    Review of Systems   Constitutional: Positive for fatigue. Negative for activity change, appetite change, chills, diaphoresis,  fever and unexpected weight change.   HENT: Positive for mouth sores. Negative for congestion, dental problem, drooling, ear discharge, ear pain, facial swelling, hearing loss, nosebleeds, postnasal drip, rhinorrhea, sinus pressure, sinus pain, sneezing, sore throat, tinnitus, trouble swallowing and voice change.    Eyes: Negative for photophobia, pain, discharge, redness, itching and visual disturbance.   Respiratory: Positive for cough and shortness of breath. Negative for apnea, choking, chest tightness, wheezing and stridor.    Cardiovascular: Positive for leg swelling. Negative for chest pain and palpitations.   Gastrointestinal: Negative for abdominal distention, abdominal pain, anal bleeding, blood in stool, constipation, diarrhea, nausea, rectal pain and vomiting.   Endocrine: Negative for cold intolerance, heat intolerance, polydipsia, polyphagia and polyuria.   Genitourinary: Negative for decreased urine volume, difficulty urinating, dysuria, flank pain, frequency, genital sores, hematuria and urgency.   Musculoskeletal: Negative for arthralgias, back pain, gait problem, joint swelling, myalgias, neck pain and neck stiffness.   Skin: Negative for color change, pallor, rash and wound.   Neurological: Positive for weakness. Negative for dizziness, tremors, seizures, syncope, facial asymmetry, speech difficulty, light-headedness, numbness and headaches.   Hematological: Negative for adenopathy. Does not bruise/bleed easily.   Psychiatric/Behavioral: Negative for agitation, behavioral problems, confusion, decreased concentration, hallucinations, self-injury, sleep disturbance and suicidal ideas. The patient is not nervous/anxious and is not hyperactive.         MEDICATIONS:    Scheduled Meds:  albuterol, 10 mg, Nebulization, Once  allopurinol, 100 mg, Oral, Q8H  aspirin, 325 mg, Oral, Daily  budesonide-formoterol, 2 puff, Inhalation, BID - RT  carvedilol, 6.25 mg, Oral, BID With Meals  citalopram, 10 mg, Oral,  "Daily  enoxaparin, 40 mg, Subcutaneous, Q24H  fentaNYL, 1 patch, Transdermal, Q72H  First Mouthwash (Magic Mouthwash), 5 mL, Swish & Spit, Q4H  folic acid, 1 mg, Oral, Daily  guaiFENesin, 600 mg, Oral, Q12H  imatinib, 400 mg, Oral, Daily   And  imatinib, 200 mg, Oral, Daily  insulin glargine, 30 Units, Subcutaneous, Daily  insulin lispro, 10 Units, Subcutaneous, TID AC  insulin lispro, 2-9 Units, Subcutaneous, TID With Meals  mirtazapine, 30 mg, Oral, Nightly  sodium chloride, 250 mL, Intravenous, Once  sodium chloride, 10 mL, Intravenous, Q12H  sodium zirconium cyclosilicate, 6 g, Oral, BID       Continuous Infusions:      PRN Meds:  •  acetaminophen **OR** acetaminophen **OR** acetaminophen  •  ALPRAZolam  •  aluminum-magnesium hydroxide-simethicone  •  dextrose  •  dextrose  •  glucagon (human recombinant)  •  HYDROcodone-acetaminophen  •  influenza vaccine  •  melatonin  •  nitroglycerin  •  ondansetron **OR** ondansetron  •  sodium chloride  •  sodium chloride     ALLERGIES:    Allergies   Allergen Reactions   • Hydromorphone GI Intolerance     dilaudid   • Morphine Nausea And Vomiting   • Propofol Hives     Previously tolerated being premedicated with diphenhydramine and famotadine       Objective    VITALS:   /58 (BP Location: Right arm, Patient Position: Lying)   Pulse 91   Temp 98 °F (36.7 °C) (Temporal)   Resp 16   Ht 162.6 cm (64\")   Wt 77.1 kg (169 lb 15.6 oz)   LMP 05/25/2022 (Approximate)   SpO2 93%   BMI 29.18 kg/m²     PHYSICAL EXAM:     Physical Exam  Vitals and nursing note reviewed.   Constitutional:       General: She is not in acute distress.     Appearance: She is ill-appearing. She is not toxic-appearing or diaphoretic.      Comments: Pale, ill appearing, edematous. Oriented.    HENT:      Head: Normocephalic and atraumatic.      Right Ear: External ear normal.      Left Ear: External ear normal.      Nose: Nose normal.      Mouth/Throat:      Mouth: Mucous membranes are moist. "      Pharynx: Oropharynx is clear. No oropharyngeal exudate or posterior oropharyngeal erythema.      Comments: Two large ulcerations in the mouth, one on the left buccal mucosa and the second on the mucosa of the inferior right lip. Hematic base for both of them (trauma?)  Eyes:      General: No scleral icterus.        Right eye: No discharge.         Left eye: No discharge.      Conjunctiva/sclera: Conjunctivae normal.      Pupils: Pupils are equal, round, and reactive to light.   Neck:      Thyroid: No thyromegaly.   Cardiovascular:      Rate and Rhythm: Normal rate and regular rhythm.      Pulses: Normal pulses.      Heart sounds: Normal heart sounds. No murmur heard.    No friction rub. No gallop.   Pulmonary:      Effort: Pulmonary effort is normal. No respiratory distress.      Breath sounds: No stridor. No wheezing, rhonchi or rales.   Abdominal:      General: Bowel sounds are normal. There is no distension.      Palpations: Abdomen is soft. There is no mass.      Tenderness: There is no abdominal tenderness. There is no right CVA tenderness, left CVA tenderness, guarding or rebound.      Hernia: No hernia is present.      Comments: Rounded, protuberant and soft. The spleen seems enlarged.    Musculoskeletal:         General: No swelling, tenderness, deformity or signs of injury. Normal range of motion.      Cervical back: Normal range of motion and neck supple. No rigidity.      Right lower leg: No edema.      Left lower leg: No edema.      Comments: Diffuse peripheral edema.    Lymphadenopathy:      Cervical: No cervical adenopathy.   Skin:     General: Skin is warm.      Coloration: Skin is not jaundiced.      Findings: No bruising, erythema, lesion or rash.   Neurological:      General: No focal deficit present.      Mental Status: She is alert and oriented to person, place, and time.      Cranial Nerves: No cranial nerve deficit.      Motor: No weakness or abnormal muscle tone.      Gait: Gait normal.    Psychiatric:         Mood and Affect: Mood normal.         Behavior: Behavior normal.         Thought Content: Thought content normal.         Judgment: Judgment normal.     I have reexamined the patient and the results are consistent with the previously documented exam. Meghann Lerma MD     RECENT LABS:    Lab Results (last 24 hours)     Procedure Component Value Units Date/Time    Sodium, Urine, Random - Urine, Clean Catch [[id number]] Collected: 11/11/22 1332    Specimen: Urine, Clean Catch Updated: 11/11/22 1358     Sodium, Urine 109 mmol/L     Narrative:      Reference intervals for random urine have not been established.  Clinical usage is dependent upon physician's interpretation in combination with other laboratory tests.       Urinalysis With Culture If Indicated - Urine, Clean Catch [[id number]]  (Normal) Collected: 11/11/22 1332    Specimen: Urine, Clean Catch Updated: 11/11/22 1357     Color, UA Yellow     Appearance, UA Clear     pH, UA 7.0     Specific Gravity, UA 1.011     Glucose, UA Negative     Ketones, UA Negative     Bilirubin, UA Negative     Blood, UA Negative     Protein, UA Negative     Leuk Esterase, UA Negative     Nitrite, UA Negative     Urobilinogen, UA 0.2 E.U./dL    Narrative:      In absence of clinical symptoms, the presence of pyuria, bacteria, and/or nitrites on the urinalysis result does not correlate with infection.  Urine microscopic not indicated.    Creatinine, Urine, Random - Urine, Clean Catch [[id number]] Collected: 11/11/22 1332    Specimen: Urine, Clean Catch Updated: 11/11/22 1341    Basic Metabolic Panel [[id number]]  (Abnormal) Collected: 11/11/22 1212    Specimen: Blood Updated: 11/11/22 1245     Glucose 129 mg/dL      BUN 51 mg/dL      Creatinine 1.58 mg/dL      Sodium 133 mmol/L      Potassium 5.9 mmol/L      Comment: Slight hemolysis detected by analyzer. Results may be affected.        Chloride 97 mmol/L      CO2 25.0 mmol/L      Calcium 8.7 mg/dL       BUN/Creatinine Ratio 32.3     Anion Gap 11.0 mmol/L      eGFR 40.5 mL/min/1.73      Comment: National Kidney Foundation and American Society of Nephrology (ASN) Task Force recommended calculation based on the Chronic Kidney Disease Epidemiology Collaboration (CKD-EPI) equation refit without adjustment for race.       Narrative:      GFR Normal >60  Chronic Kidney Disease <60  Kidney Failure <15      Comprehensive Hematopathology Evaluation (Integrated Oncology) [[id number]] Collected: 11/03/22 1643    Specimen: Blood Updated: 11/11/22 1226     CCV RESULT Comment^Text^TXT     Comment: See Report  DISCLAIMER: REFER TO HARDCOPY OR PDF FOR COMPLETE RESULT.   If synopsis provided, clinical decisions should not be   based on this interfaced synopsis alone.  Performed at:  1 - [facility]  [address] [address]  :  Molly Vega M.D., Ph.D., Phone:  [phone]       Narrative:      Performed at:  1 - [facility]  [address] [address]  :  Molly Vega M.D., Ph.D., Phone:  [phone]    Uric Acid [[id number]]  (Abnormal) Collected: 11/11/22 0426    Specimen: Blood Updated: 11/11/22 1211     Uric Acid 9.6 mg/dL     POC Glucose Once [[id number]]  (Abnormal) Collected: 11/11/22 1147    Specimen: Blood Updated: 11/11/22 1148     Glucose 114 mg/dL      Comment: Serial Number: [id number]:  [id number]       POC Glucose Once [[id number]]  (Abnormal) Collected: 11/11/22 0758    Specimen: Blood Updated: 11/11/22 0759     Glucose 174 mg/dL      Comment: Serial Number: [id number]:  [id number]       Potassium [[id number]]  (Normal) Collected: 11/11/22 0426    Specimen: Blood Updated: 11/11/22 0539     Potassium 4.9 mmol/L     POC Glucose Once [[id number]]  (Abnormal) Collected: 11/11/22 0521    Specimen: Blood Updated: 11/11/22 0522     Glucose 220 mg/dL      Comment: Serial Number:  [id number]:  [id number]       Manual Differential [[id number]]  (Abnormal) Collected: 11/11/22 0055    Specimen: Blood Updated: 11/11/22 0332     Neutrophil % 24.0 %      Lymphocyte % 3.0 %      Monocyte % 2.0 %      Eosinophil % 7.0 %      Basophil % 2.0 %      Bands %  9.0 %      Metamyelocyte % 11.0 %      Myelocyte % 9.0 %      Promyelocyte % 11.0 %      Blasts % 22.0 %      Neutrophils Absolute 93.62 10*3/mm3      Lymphocytes Absolute 8.51 10*3/mm3      Monocytes Absolute 5.67 10*3/mm3      Eosinophils Absolute 19.86 10*3/mm3      Basophils Absolute 5.67 10*3/mm3      Anisocytosis Slight/1+     Dacrocytes Slight/1+     Microcytes Slight/1+     Poikilocytes Slight/1+     Polychromasia Slight/1+     WBC Morphology Normal     Platelet Estimate Adequate     Large Platelets Slight/1+    Narrative:      Reviewed by Pathologist within the past 30 days on Final Diagnosis  Acute leukemia, favor myeloid Electronically signed by Amador Jackson MD on 11/1/2022 at 0950.  JLS 11/11/22     CBC & Differential [[id number]]  (Abnormal) Collected: 11/11/22 0055    Specimen: Blood Updated: 11/11/22 0332    Narrative:      The following orders were created for panel order CBC & Differential.  Procedure                               Abnormality         Status                     ---------                               -----------         ------                     CBC Auto Differential[[id number]]        Abnormal            Final result               Scan Slide[[id number]]                                       Final result                 Please view results for these tests on the individual orders.    Scan Slide [[id number]] Collected: 11/11/22 0055    Specimen: Blood Updated: 11/11/22 0332     Scan Slide --     Comment: See Manual Differential Results       CBC Auto Differential [[id number]]  (Abnormal) Collected: 11/11/22 0055    Specimen: Blood Updated: 11/11/22 0332     .70 10*3/mm3      RBC 3.78 10*6/mm3      Hemoglobin  9.0 g/dL      Hematocrit 30.6 %      MCV 80.9 fL      MCH 23.9 pg      MCHC 29.5 g/dL      RDW 18.9 %      RDW-SD 51.6 fl      MPV 8.3 fL      Platelets 340 10*3/mm3     Narrative:      Modified report. Previous result was Hemogram on 11/11/2022 at 0217 EST.  The previously reported component NRBC is no longer being reported. Previous result was 0.1 /100 WBC (Reference Range: 0.0-0.2 /100 WBC) on 11/11/2022 at 0217 EST.    Basic Metabolic Panel [[id number]]  (Abnormal) Collected: 11/11/22 0055    Specimen: Blood Updated: 11/11/22 0211     Glucose 259 mg/dL      BUN 58 mg/dL      Creatinine 1.94 mg/dL      Sodium 133 mmol/L      Potassium 6.2 mmol/L      Chloride 97 mmol/L      CO2 25.0 mmol/L      Calcium 8.4 mg/dL      BUN/Creatinine Ratio 29.9     Anion Gap 11.0 mmol/L      eGFR 31.6 mL/min/1.73      Comment: National Kidney Foundation and American Society of Nephrology (ASN) Task Force recommended calculation based on the Chronic Kidney Disease Epidemiology Collaboration (CKD-EPI) equation refit without adjustment for race.       Narrative:      GFR Normal >60  Chronic Kidney Disease <60  Kidney Failure <15      POC Glucose Once [[id number]]  (Abnormal) Collected: 11/10/22 1641    Specimen: Blood Updated: 11/10/22 1642     Glucose 151 mg/dL      Comment: Serial Number: [id number]:  [id number]             PENDING RESULTS: Flow cytometry    IMAGING REVIEWED:  No radiology results for the last day    Assessment & Plan      ASSESSMENT:    Hematology/Oncology was consulted on a patient known to us and followed in the office by Dr. Lerma for her diagnosis of CML currently on imatinib and hydroxyurea.  Patient has been very noncompliant with treatments.  She is currently on Gleevec 400 mg daily but this was recently increased to 600 mg daily.  She is also supposed to be on hydroxyurea twice a day.  She has been noncompliant with medication intake and also routine follow-up in the office for ongoing care.  Patient  has had progressive shortness of breath and swelling on her lower extremities.    1. CML not in remission: Currently on imatinib with plan to increase dose to 600 mg daily once medication is available.  Hydroxyurea is currently on hold. Patient has been noncompliant with her medicines in the past.  Continue Gleevec 600 mg p.o. daily  2. Oral lesion likely leukemic cells improving on Gleevec  3. Acute kidney injury: Nephrologist on board.  Rule out tumor lysis syndrome.  On allopurinol  4. Anemia: Secondary to CML and TKI, hydroxyurea.  Her hemoglobin has improved on treatment to 9.2 g per DL  5. Hyperleukocytosis: Secondary to CML, beginning to improve on Gleevec  6. History of pulmonary embolism: On Xarelto as an outpatient.  Lovenox currently.  7. Acute on chronic diastolic heart failure: Management per primary team and cardiology.  8. Multiple chronic conditions: Type 2 diabetes mellitus, hypertension, depression with anxiety.     PLANS:  1. Continue Daily CBCs  2. Check mag level, phosphorus and daily uric acid  3. Reviewed results of flow cytometry  4. Continue Gleevec to 600 mg p.o. daily  5. She has been seen by psychiatrist and currently on Celexa 10 mg daily.  Remeron increased to 30 mg at night.  Outpatient counseling has been recommended  6. Discussed with patient and nursing      Electronically signed by Meghann Lerma MD, 11/11/22, 3:29 PM EST.    I spent more than 35 total minutes, face-to-face, caring for Hope today.  90% of this time involved counseling and/or coordination of care as documented within this note.

## 2022-11-11 NOTE — NURSING NOTE
Noted potassium 5.9. Notified MD Zambrano. Awaiting response.      Orders given for Lokelma x 2 doses. BMP timed for 2200 for K+ recheck.

## 2022-11-11 NOTE — PLAN OF CARE
Goal Outcome Evaluation:   Pt a/o. RA. Cardiology and nephrology following. K+ 5.9. Lokelma given to bring down.  at bedside. PRN Norco given for pain. UA negative. No complaints. Call light in reach.

## 2022-11-11 NOTE — CASE MANAGEMENT/SOCIAL WORK
Continued Stay Note  ZOHAIB Amor     Patient Name: Nieves cM  MRN: [id number]  Today's Date: 11/11/2022    Admit Date: 11/3/2022    Plan: D/C Plan : Anticipate home with daughter and mother , pt is on R/A today   Discharge Plan     Row Name 11/11/22 1435       Plan    Plan D/C Plan : Anticipate home with daughter and mother , pt is on R/A today    Plan Comments Barrier to D/C : Decreasing lasix , crt increasing , possible D/C over the weekend               Discharge Codes    No documentation.               Expected Discharge Date and Time     Expected Discharge Date Expected Discharge Time    Nov 11, 2022             Delfina Tolbert RN

## 2022-11-11 NOTE — CONSULTS
Nutrition Services    Patient Name: Nieves Mc  YOB: 1975  MRN: [id number]  Admission date: 11/3/2022    PPE Documentation        PPE Worn By Provider Did not enter room this encounter    PPE Worn By Patient  N/A     NUTRITION SCREENING      Encounter Information: Check on for LOS x 8 days.  Admitted for chest pain.         PO Diet: Diet Cardiac, Diabetic/Consistent Carbs, Renal; Healthy Heart; Diabetic - Consistent Carb; 2gm K+   PO Supplements: None ordered    PO Intake:  93% average PO intakes x 17 documented meals since admission        Labs (reviewed below): Reviewed, management per attending         GI Function:  Last BM 11/11 (today        Skin: Intact        Weight Hx Review: Wt Readings from Last 40 Encounters:   11/11/22 77.1 kg (169 lb 15.6 oz)   10/31/22 68.3 kg (150 lb 9.2 oz)   10/19/22 56.2 kg (123 lb 14.4 oz)   10/12/22 56.2 kg (124 lb)   09/22/22 60.8 kg (134 lb)   09/15/22 60.8 kg (134 lb)   08/17/22 60.3 kg (133 lb)   08/16/22 59.5 kg (131 lb 1.6 oz)   08/09/22 56.7 kg (125 lb)   08/04/22 60.6 kg (133 lb 9.6 oz)   07/27/22 57.6 kg (127 lb)   07/08/22 58 kg (127 lb 13.9 oz)   06/10/22 56.1 kg (123 lb 10.9 oz)   06/01/22 56.7 kg (125 lb)   05/18/22 56.7 kg (125 lb)   05/09/22 56.7 kg (125 lb)   03/29/22 57 kg (125 lb 9.6 oz)   03/01/22 53.5 kg (118 lb)   02/14/22 53.5 kg (118 lb)   01/28/22 58.1 kg (128 lb)   01/24/22 60.3 kg (133 lb)   01/17/22 65.1 kg (143 lb 8.3 oz)   11/15/21 59 kg (130 lb)   10/18/21 59 kg (130 lb)   09/28/21 59.3 kg (130 lb 12.8 oz)   09/22/21 60.3 kg (133 lb)   07/19/21 60.3 kg (133 lb)   06/16/21 60.3 kg (133 lb)   06/07/21 60.3 kg (133 lb)   04/19/21 64.3 kg (141 lb 12.8 oz)   04/13/21 61.2 kg (135 lb)   03/11/21 66 kg (145 lb 8.1 oz)   02/22/21 61.7 kg (136 lb 0.4 oz)   10/26/20 68.3 kg (150 lb 9.2 oz)   10/16/19 76 kg (167 lb 8.8 oz)   07/01/19 74.8 kg (165 lb)   06/30/19 76.5 kg (168 lb 10.4 oz)   11/21/16 72.6 kg (160 lb)          Nutrition  Intervention: No nutritional diagnosis noted at this time, RD will continue to monitor for any nutritional diagnosis that may arise.       Results from last 7 days   Lab Units 11/11/22  1212 11/11/22  0426 11/11/22  0055 11/10/22  0343   SODIUM mmol/L 133*  --  133* 133*   POTASSIUM mmol/L 5.9* 4.9 6.2* 5.2   CHLORIDE mmol/L 97*  --  97* 96*   CO2 mmol/L 25.0  --  25.0 25.0   BUN mg/dL 51*  --  58* 40*   CREATININE mg/dL 1.58*  --  1.94* 1.52*   CALCIUM mg/dL 8.7  --  8.4* 8.2*   GLUCOSE mg/dL 129*  --  259* 161*     Results from last 7 days   Lab Units 11/11/22  0055 11/09/22  0840 11/08/22  0848   MAGNESIUM mg/dL  --   --  2.1   HEMOGLOBIN g/dL 9.0*   < >  --    HEMATOCRIT % 30.6*   < >  --     < > = values in this interval not displayed.     COVID19   Date Value Ref Range Status   07/27/2022 Not Detected Not Detected - Ref. Range Final     Lab Results   Component Value Date    HGBA1C 7.1 (H) 07/28/2022       RD to follow up per protocol.    Electronically signed by:  Chayo Robert RD  11/11/22 13:55 EST

## 2022-11-11 NOTE — PROGRESS NOTES
Reason for follow-up: CHF     Patient Care Team:  Alida Latham APRN as PCP - General (Nurse Practitioner)  Meghann Lerma MD as Consulting Physician (Hematology and Oncology)    Subjective .   Nieves Mc is doing fair today.     ROS    Hydromorphone, Morphine, and Propofol    Scheduled Meds:albuterol, 10 mg, Nebulization, Once  allopurinol, 100 mg, Oral, Q8H  aspirin, 325 mg, Oral, Daily  budesonide-formoterol, 2 puff, Inhalation, BID - RT  carvedilol, 6.25 mg, Oral, BID With Meals  citalopram, 10 mg, Oral, Daily  enoxaparin, 40 mg, Subcutaneous, Q24H  fentaNYL, 1 patch, Transdermal, Q72H  First Mouthwash (Magic Mouthwash), 5 mL, Swish & Spit, Q4H  folic acid, 1 mg, Oral, Daily  guaiFENesin, 600 mg, Oral, Q12H  imatinib, 400 mg, Oral, Daily   And  imatinib, 200 mg, Oral, Daily  insulin glargine, 30 Units, Subcutaneous, Daily  insulin lispro, 10 Units, Subcutaneous, TID AC  insulin lispro, 2-9 Units, Subcutaneous, TID With Meals  mirtazapine, 30 mg, Oral, Nightly  sodium chloride, 250 mL, Intravenous, Once  sodium chloride, 10 mL, Intravenous, Q12H  sodium zirconium cyclosilicate, 6 g, Oral, BID      Continuous Infusions:   PRN Meds:.•  acetaminophen **OR** acetaminophen **OR** acetaminophen  •  ALPRAZolam  •  aluminum-magnesium hydroxide-simethicone  •  dextrose  •  dextrose  •  glucagon (human recombinant)  •  HYDROcodone-acetaminophen  •  influenza vaccine  •  melatonin  •  nitroglycerin  •  ondansetron **OR** ondansetron  •  sodium chloride  •  sodium chloride      VITAL SIGNS  Vitals:    11/11/22 0416 11/11/22 0741 11/11/22 0744 11/11/22 0818   BP: 114/60   102/58   BP Location: Right arm   Right arm   Patient Position: Lying   Lying   Pulse: 98 89 84 91   Resp: 16 16 16 16   Temp: 97.9 °F (36.6 °C)   98 °F (36.7 °C)   TempSrc: Oral   Temporal   SpO2: 90% 93% 95% 93%   Weight: 77.1 kg (169 lb 15.6 oz)      Height:           Flowsheet Rows    Flowsheet Row First Filed Value   Admission  "Height 162.6 cm (64\") Documented at 11/03/2022 0930   Admission Weight 68 kg (150 lb) Documented at 11/03/2022 0930             Physical Exam  VITALS REVIEWED    General:      well developed, in no acute distress.    Head:      normocephalic and atraumatic.    Eyes:      PERRL/EOM intact, conjunctiva and sclera clear with out nystagmus.    Neck:      no masses, thyromegaly,  trachea central with normal respiratory effort and PMI displaced laterally  Lungs:      Clear  Heart:       Regular rate and rhythm  Msk:      no deformity or scoliosis noted of thoracic or lumbar spine.    Pulses:      pulses normal in all 4 extremities.    Extremities:       Left lower extremity edema  Neurologic:      no focal deficits.   alert oriented x3  Skin:      intact without lesions or rashes.    Psych:      alert and cooperative; normal mood and affect; normal attention span and concentration.          LAB RESULTS (LAST 7 DAYS)    CBC  Results from last 7 days   Lab Units 11/11/22  0055 11/10/22  0343 11/09/22  0840 11/06/22  0045 11/05/22  0149   WBC 10*3/mm3 283.70* 271.70* 307.70* 240.10* 284.90*   RBC 10*6/mm3 3.78 3.77 4.19 3.51* 3.48*   HEMOGLOBIN g/dL 9.0* 9.1* 10.0* 8.6* 8.4*   HEMATOCRIT % 30.6* 30.2* 33.5* 27.2* 27.3*   MCV fL 80.9 80.3 80.0 77.5* 78.7*   PLATELETS 10*3/mm3 340 298 355 449 439       BMP  Results from last 7 days   Lab Units 11/11/22  1212 11/11/22  0426 11/11/22  0055 11/10/22  0343 11/09/22  0840 11/08/22  2308 11/08/22  1300 11/08/22  0848 11/06/22  0045 11/05/22  0149   SODIUM mmol/L 133*  --  133* 133*  --  133*  --  137 133* 135*   POTASSIUM mmol/L 5.9* 4.9 6.2* 5.2 5.1 5.6* 5.0 5.4* 4.6 4.5   CHLORIDE mmol/L 97*  --  97* 96*  --  95*  --  98 94* 96*   CO2 mmol/L 25.0  --  25.0 25.0  --  27.0  --  25.0 27.0 27.0   BUN mg/dL 51*  --  58* 40*  --  43*  --  30* 30* 26*   CREATININE mg/dL 1.58*  --  1.94* 1.52*  --  1.47*  --  0.86 0.90 1.02*   GLUCOSE mg/dL 129*  --  259* 161*  --  202*  --  142* 199* " 190*   MAGNESIUM mg/dL  --   --   --   --   --   --   --  2.1  --   --        CMP   Results from last 7 days   Lab Units 11/11/22  1212 11/11/22  0426 11/11/22  0055 11/10/22  0343 11/09/22  0840 11/08/22  2308 11/08/22  1300 11/08/22  0848 11/06/22  0045 11/05/22  0149   SODIUM mmol/L 133*  --  133* 133*  --  133*  --  137 133* 135*   POTASSIUM mmol/L 5.9* 4.9 6.2* 5.2 5.1 5.6* 5.0 5.4* 4.6 4.5   CHLORIDE mmol/L 97*  --  97* 96*  --  95*  --  98 94* 96*   CO2 mmol/L 25.0  --  25.0 25.0  --  27.0  --  25.0 27.0 27.0   BUN mg/dL 51*  --  58* 40*  --  43*  --  30* 30* 26*   CREATININE mg/dL 1.58*  --  1.94* 1.52*  --  1.47*  --  0.86 0.90 1.02*   GLUCOSE mg/dL 129*  --  259* 161*  --  202*  --  142* 199* 190*         BNP        TROPONIN        CoAg        Creatinine Clearance  Estimated Creatinine Clearance: 44.3 mL/min (A) (by C-G formula based on SCr of 1.58 mg/dL (H)).    ABG          EKG    I personally reviewed the patient's EKG/Telemetry data: Sinus rhythm      Assessment & Plan       Chest pain, unspecified type    CML (chronic myelocytic leukemia) ([facility])    Depression with anxiety    History of pulmonary embolism    Medically noncompliant    Type 2 diabetes mellitus with diabetic polyneuropathy, with long-term current use of insulin ([facility])    Acute diastolic CHF (congestive heart failure) ([facility])    NICM (nonischemic cardiomyopathy) ([facility])    Bilateral lower extremity edema      Hope A Jesusita is a 47-year-old female patient who has CML, nonischemic cardiomyopathy, presented to the hospital with CHF exacerbation.  A stress test was done which showed no perfusion defects, ejection fraction was low normal.  Patient was started on Entresto as well as Coreg, she was treated with IV diuretics.  Diuretics and Entresto, however have been discontinued due to worsening renal function.  Currently she seems to be euvolemic.  However I do recommend resuming p.o. diuretic upon discharge if all possible.    I discussed the  patients findings and my recommendations with patient and agrees with outlined plan.    Hamida Feldman MD  11/11/22  16:00 EST

## 2022-11-11 NOTE — CONSULTS
NEPHROLOGY CONSULTATION-----[facility]  (Covering for Dr Andre)    [facility]  [phone]  Bart Zambrano MD    Patient Care Team:  Alida Latham APRN as PCP - General (Nurse Practitioner)  Meghann Lerma MD as Consulting Physician (Hematology and Oncology)    CC/REASON FOR CONSULTATION: ANAYA/Hyperkalemia    PHYSICIAN REQUESTING CONSULTATION: Dr Lemos    History of Present Illness  47 year old female with PMHx CML on chemo, DM2, PE presented on 11/3/22 with chest pain. Her CR was gradsually incvreased to 1.9 this am, K was high last night. She was hypotensive. She was recently started on entresto for CHF, EF 40%. She was getting lasix iv and metolazone.  She was in hospital in July and seen by Dr Andre for hyperkalemia and hyperuricemia, thought to be TLS. Denies dysuria, gross hematurias. No vomiting or diarrhea. She was on Losartan prior to admission as well.   Patient seen in coverage for Dr Andre.    Review of Systems   As noted above, otherwise 10 systems reviewed and were negative.    Past Medical History:   Diagnosis Date   • Bone pain    • COVID-19 virus infection 2022   • Diabetes mellitus (HCC)    • Extremity pain     Carlin. legs pain   • Leg pain     left leg greater   • Migraine    • Pulmonary embolism (HCC)    • Vision loss     doing surgery       Past Surgical History:   Procedure Laterality Date   • BONE MARROW BIOPSY     • BREAST SURGERY     • BRONCHOSCOPY N/A 2022    Procedure: BRONCHOSCOPY bilateral lung washing;  Surgeon: Charlene Camara MD;  Location: [facility] ENDOSCOPY;  Service: Pulmonary;  Laterality: N/A;  post: rule out infection vs transfusion lung injury   •  SECTION     • CHOLECYSTECTOMY     • EYE SURGERY      laser surgery due  to hemmorage--- 2021-- another surgery  lt eye11/15/21   • RETINAL DETACHMENT SURGERY     • SPINE SURGERY      Lombardi spinal block   • TUBAL ABDOMINAL LIGATION          Family History   Problem Relation Age of Onset   • Diabetes Mother    • Diabetes Maternal Grandmother    • Heart attack Maternal Grandmother    • Stroke Maternal Grandmother        Social History     Tobacco Use   • Smoking status: Never   • Smokeless tobacco: Never   Vaping Use   • Vaping Use: Never used   Substance Use Topics   • Alcohol use: No   • Drug use: No       Home Meds:   Medications Prior to Admission   Medication Sig Dispense Refill Last Dose   • ALPRAZolam (Xanax) 0.25 MG tablet Take 1 tablet by mouth At Night As Needed for Anxiety. 30 tablet 0    • budesonide-formoterol (SYMBICORT) 160-4.5 MCG/ACT inhaler Inhale 2 puffs 2 (Two) Times a Day. 10.2 g 1    • citalopram (CeleXA) 10 MG tablet Take 1 tablet by mouth Daily. To begin 1/31/22 30 tablet 2    • fentaNYL (DURAGESIC) 25 MCG/HR patch Place 1 patch on the skin as directed by provider Every 72 (Seventy-Two) Hours. 10 each 0 11/1/2022   • folic acid (FOLVITE) 1 MG tablet Take 1 tablet by mouth Daily. 30 tablet 0    • guaiFENesin (MUCINEX) 600 MG 12 hr tablet Take 1 tablet by mouth Every 12 (Twelve) Hours. 30 tablet 0    • hydroxyurea (HYDREA) 500 MG capsule Take 1 capsule by mouth Daily. @ 1400      • hydrOXYzine (ATARAX) 25 MG tablet Take 1 tablet by mouth Every 6 (Six) Hours As Needed for Itching. 20 tablet 0    • imatinib (GLEEVEC) 400 MG chemo tablet Take 1 tablet by mouth Daily for 30 days. Take with food and glass of water. Do not take with Grapefruit juice. 30 tablet 5    • Insulin Glargine (BASAGLAR KWIKPEN) 100 UNIT/ML injection pen Inject 20 Units under the skin into the appropriate area as directed Daily. 10 mL 3    • Insulin Lispro (ADMELOG SOLOSTAR) 100 UNIT/ML injection pen Inject 6 Units under the skin into the appropriate area as directed 3 (Three) Times a Day. 3 mL 3    • losartan (COZAAR) 25 MG tablet Take 1 tablet by mouth Daily. 30 tablet 0    • metoprolol succinate XL (TOPROL-XL) 25 MG 24 hr tablet Take 25 mg by mouth Daily.       • mirtazapine (REMERON) 15 MG tablet Take 1 tablet by mouth Every Night for 30 days. Start Mirtazapine and decrease Trazodone to 1/2 tablet x 14 days and then stop Trazodone. 30 tablet 2    • ondansetron ODT (ZOFRAN-ODT) 8 MG disintegrating tablet Place 1 tablet on the tongue Every 8 (Eight) Hours As Needed for Nausea or Vomiting. 20 tablet 2    • [DISCONTINUED] allopurinol (ZYLOPRIM) 100 MG tablet Take 1 tablet by mouth Daily. 30 tablet 0    • [DISCONTINUED] hydroxyurea (HYDREA) 500 MG capsule Take 2 capsules by mouth 2 (Two) Times a Day. 120 capsule 0    • imatinib (GLEEVEC) 100 MG chemo tablet Take 2 tablets by mouth with 1 other imatinib prescription for 600 mg total Daily. Take with food and large glass of water; Do not take with Grapefruit juice. 60 tablet 2    • imatinib (GLEEVEC) 400 MG chemo tablet Take 1 tablet by mouth with 1 other imatinib prescription for 600 mg total Daily. Take with food and large glass of water; Do not take with Grapefruit juice. 30 tablet 2        Scheduled Meds:  albuterol, 10 mg, Nebulization, Once  allopurinol, 100 mg, Oral, Q8H  aspirin, 325 mg, Oral, Daily  budesonide-formoterol, 2 puff, Inhalation, BID - RT  carvedilol, 6.25 mg, Oral, BID With Meals  citalopram, 10 mg, Oral, Daily  enoxaparin, 40 mg, Subcutaneous, Q24H  fentaNYL, 1 patch, Transdermal, Q72H  First Mouthwash (Magic Mouthwash), 5 mL, Swish & Spit, Q4H  folic acid, 1 mg, Oral, Daily  furosemide, 20 mg, Oral, Daily  guaiFENesin, 600 mg, Oral, Q12H  imatinib, 400 mg, Oral, Daily   And  imatinib, 200 mg, Oral, Daily  insulin glargine, 30 Units, Subcutaneous, Daily  insulin lispro, 10 Units, Subcutaneous, TID AC  insulin lispro, 2-9 Units, Subcutaneous, TID With Meals  mirtazapine, 30 mg, Oral, Nightly  sodium chloride, 250 mL, Intravenous, Once  sodium chloride, 10 mL, Intravenous, Q12H        Continuous Infusions:       PRN Meds:  •  acetaminophen **OR** acetaminophen **OR** acetaminophen  •  ALPRAZolam  •   aluminum-magnesium hydroxide-simethicone  •  dextrose  •  dextrose  •  glucagon (human recombinant)  •  HYDROcodone-acetaminophen  •  influenza vaccine  •  melatonin  •  nitroglycerin  •  ondansetron **OR** ondansetron  •  sodium chloride  •  sodium chloride    Allergies:  Hydromorphone, Morphine, and Propofol    OBJECTIVE    Vital Signs  Temp:  [97.9 °F (36.6 °C)-98.5 °F (36.9 °C)] 98 °F (36.7 °C)  Heart Rate:  [] 91  Resp:  [16-20] 16  BP: ()/(56-70) 102/58    No intake/output data recorded.  I/O last 3 completed shifts:  In: 2160 [P.O.:2160]  Out: 1300 [Urine:1300]    Physical Exam:  General Appearance: alert, appears stated age and cooperative  Head: normocephalic, without obvious abnormality and atraumatic  Eyes: conjunctivae and sclerae normal and no icterus  Neck: supple and no JVD  Lungs: clear to auscultation and respirations regular  Heart: regular rhythm & normal rate and normal S1, S2  Chest Wall: no abnormalities observed  Abdomen: normal bowel sounds and soft, nontender  Extremities: moves extremities well, no edema, no cyanosis  Skin: no bleeding, bruising or rash  Neurologic: Alert, and oriented. No focal deficits    Results Review:    I reviewed the patient's new clinical results.    WBC WBC   Date Value Ref Range Status   11/11/2022 283.70 (C) 3.40 - 10.80 10*3/mm3 Final   11/10/2022 271.70 (C) 3.40 - 10.80 10*3/mm3 Final   11/09/2022 307.70 (C) 3.40 - 10.80 10*3/mm3 Final      HGB Hemoglobin   Date Value Ref Range Status   11/11/2022 9.0 (L) 12.0 - 15.9 g/dL Final   11/10/2022 9.1 (L) 12.0 - 15.9 g/dL Final   11/09/2022 10.0 (L) 12.0 - 15.9 g/dL Final      HCT Hematocrit   Date Value Ref Range Status   11/11/2022 30.6 (L) 34.0 - 46.6 % Final   11/10/2022 30.2 (L) 34.0 - 46.6 % Final   11/09/2022 33.5 (L) 34.0 - 46.6 % Final      Platelets No results found for: LABPLAT   MCV MCV   Date Value Ref Range Status   11/11/2022 80.9 79.0 - 97.0 fL Final   11/10/2022 80.3 79.0 - 97.0 fL Final    11/09/2022 80.0 79.0 - 97.0 fL Final          Sodium Sodium   Date Value Ref Range Status   11/11/2022 133 (L) 136 - 145 mmol/L Final   11/10/2022 133 (L) 136 - 145 mmol/L Final   11/08/2022 133 (L) 136 - 145 mmol/L Final      Potassium Potassium   Date Value Ref Range Status   11/11/2022 4.9 3.5 - 5.2 mmol/L Final   11/11/2022 6.2 (C) 3.5 - 5.2 mmol/L Final   11/10/2022 5.2 3.5 - 5.2 mmol/L Final   11/09/2022 5.1 3.5 - 5.2 mmol/L Final   11/08/2022 5.6 (H) 3.5 - 5.2 mmol/L Final   11/08/2022 5.0 3.5 - 5.2 mmol/L Final     Comment:     Slight hemolysis detected by analyzer. Results may be affected.      Chloride Chloride   Date Value Ref Range Status   11/11/2022 97 (L) 98 - 107 mmol/L Final   11/10/2022 96 (L) 98 - 107 mmol/L Final   11/08/2022 95 (L) 98 - 107 mmol/L Final      CO2 CO2   Date Value Ref Range Status   11/11/2022 25.0 22.0 - 29.0 mmol/L Final   11/10/2022 25.0 22.0 - 29.0 mmol/L Final   11/08/2022 27.0 22.0 - 29.0 mmol/L Final      BUN BUN   Date Value Ref Range Status   11/11/2022 58 (H) 6 - 20 mg/dL Final   11/10/2022 40 (H) 6 - 20 mg/dL Final   11/08/2022 43 (H) 6 - 20 mg/dL Final      Creatinine Creatinine   Date Value Ref Range Status   11/11/2022 1.94 (H) 0.57 - 1.00 mg/dL Final   11/10/2022 1.52 (H) 0.57 - 1.00 mg/dL Final   11/08/2022 1.47 (H) 0.57 - 1.00 mg/dL Final      Calcium Calcium   Date Value Ref Range Status   11/11/2022 8.4 (L) 8.6 - 10.5 mg/dL Final   11/10/2022 8.2 (L) 8.6 - 10.5 mg/dL Final   11/08/2022 8.3 (L) 8.6 - 10.5 mg/dL Final      PO4 No results found for: CAPO4   Albumin No results found for: ALBUMIN   Magnesium No results found for: MG   Uric Acid No results found for: URICACID       Imaging Results (Last 72 Hours)     ** No results found for the last 72 hours. **            Results for orders placed during the hospital encounter of 11/03/22    XR Chest 1 View    Narrative  DATE OF EXAM:  11/3/2022 10:16 AM    PROCEDURE:  XR CHEST 1 VW-    INDICATIONS:  Chest Pain  Protocol    COMPARISON:  10/31/2022 and prior    TECHNIQUE:  Portable Chest    FINDINGS:    Study is limited by overlying support apparatus. Lung volumes are low.  There is cardiomegaly which is significant change when accounting for  differences in inspiratory volume. Mild pulmonary vascular congestion is  noted. Increased hazy and interstitial opacities are noted diffusely  which are accentuated by low lung volumes. No obvious pleural effusion  noted. Osseous structures are unremarkable    Impression  IMPRESSION :  Cardiomegaly and mild pulmonary vascular congestion which is increase in  the comparison. This may be in part artifactual from low lung volumes  versus underlying pulmonary edema or pneumonia in the correct clinical  setting[    Electronically Signed By-Freddy Toribio On:11/3/2022 10:26 AM  This report was finalized on [id number] by  Freddy Toribio, .      Results for orders placed during the hospital encounter of 10/31/22    XR Chest 1 View    Narrative  DATE OF EXAM:  10/31/2022 2:11 PM    PROCEDURE:  XR CHEST 1 VW-    INDICATIONS:  shortness of breath    COMPARISON:  09/22/2022, 07/27/2022    TECHNIQUE:  [Portable chest radiograph]    FINDINGS:  Mild hazy groundglass infiltrates are seen throughout the lungs  bilaterally with mild interstitial changes. The findings have improved.  Cardiomegaly is observed. The mediastinum is stable. No acute osseous  abnormalities are seen.    Impression  1.     Improved aeration throughout the lungs bilaterally. There are  mild residual hazy groundglass densities throughout the lungs with mild  interstitial changes. These finds suggest mild underlying chronic  pulmonary edema. Stable cardiomegaly is noted.    Electronically Signed By-Amador Marquez MD On:10/31/2022 2:34 PM  This report was finalized on [id number] by  Amador Marquez MD.      Results for orders placed during the hospital encounter of 09/22/22    XR Chest PA & Lateral    Narrative  XR CHEST  PA AND LATERAL-    Date of Exam: 9/22/2022 11:53 AM    Indication: SOA and Cough; C92.10-Chronic myeloid leukemia,  BCR/ABL-positive, not having achieved remission.    Comparison: CTA chest 07/27/2022, AP chest x-ray 07/27/2022.    Technique: Two views of the chest were obtained.    FINDINGS: There are bilateral perihilar and bibasilar airspace  opacities, which have decreased compared to 07/27/2022 chest x-ray. No  definite pleural effusion is seen. Cardiac silhouette remains enlarged.    Impression  1.     Decreased, but not resolved bilateral perihilar and bibasilar  airspace opacities, which may be due to pulmonary edema and/or  multifocal infection.  2.     Stable cardiomegaly.      Electronically Signed By-Sandra Ramon MD On:9/22/2022 12:10 PM  This report was finalized on [id number] by  Sandra Ramon MD.        Results for orders placed during the hospital encounter of 03/11/21    Duplex Venous Lower Extremity - Left    Interpretation Summary  · Normal left lower extremity venous duplex scan.      ASSESSMENT / PLAN      Chest pain, unspecified type    CML (chronic myelocytic leukemia) (HCC)    Depression with anxiety    History of pulmonary embolism    Medically noncompliant    Type 2 diabetes mellitus with diabetic polyneuropathy, with long-term current use of insulin (HCC)    Acute diastolic CHF (congestive heart failure) (HCC)    NICM (nonischemic cardiomyopathy) (HCC)    Bilateral lower extremity edema      · ANAYA--likely pre-renal and or ATN in setting of hypotension/ARBs/diuretics. Will check UA, hold entresto, diuretics. Check uric acid (r/o TLS). Further w/u as deemed necessary  · Hyperkalemia--due to ANAYA/entresto. Hold for now  · DM2  · CML--on chemo  · Chronic diastolic heart failure--compensated. Given rising CR will hold diuretics for now    I discussed the patient's findings and my recommendations with patient and consulting provider    Will follow along closely. Thank you for allowing us  to see this patient in renal consultation.    [facility]  [phone]  MD Bart Mendosa MD  11/11/22  11:56 EST

## 2022-11-12 LAB
ALBUMIN SERPL-MCNC: 3.3 G/DL (ref 3.5–5.2)
ALBUMIN/GLOB SERPL: 1.1 G/DL
ALP SERPL-CCNC: 1460 U/L (ref 39–117)
ALT SERPL W P-5'-P-CCNC: 38 U/L (ref 1–33)
ANION GAP SERPL CALCULATED.3IONS-SCNC: 12 MMOL/L (ref 5–15)
ANISOCYTOSIS BLD QL: ABNORMAL
AST SERPL-CCNC: 45 U/L (ref 1–32)
BASOPHILS # BLD MANUAL: 13.53 10*3/MM3 (ref 0–0.2)
BASOPHILS NFR BLD MANUAL: 5 % (ref 0–1.5)
BILIRUB SERPL-MCNC: 0.6 MG/DL (ref 0–1.2)
BLASTS NFR BLD MANUAL: 17 % (ref 0–0)
BUN SERPL-MCNC: 55 MG/DL (ref 6–20)
BUN/CREAT SERPL: 29.3 (ref 7–25)
CALCIUM SPEC-SCNC: 8.3 MG/DL (ref 8.6–10.5)
CHLORIDE SERPL-SCNC: 97 MMOL/L (ref 98–107)
CO2 SERPL-SCNC: 25 MMOL/L (ref 22–29)
CREAT SERPL-MCNC: 1.88 MG/DL (ref 0.57–1)
DEPRECATED RDW RBC AUTO: 53.8 FL (ref 37–54)
EGFRCR SERPLBLD CKD-EPI 2021: 32.9 ML/MIN/1.73
EOSINOPHIL # BLD MANUAL: 8.12 10*3/MM3 (ref 0–0.4)
EOSINOPHIL NFR BLD MANUAL: 3 % (ref 0.3–6.2)
ERYTHROCYTE [DISTWIDTH] IN BLOOD BY AUTOMATED COUNT: 19.6 % (ref 12.3–15.4)
GLOBULIN UR ELPH-MCNC: 2.9 GM/DL
GLUCOSE BLDC GLUCOMTR-MCNC: 122 MG/DL (ref 70–105)
GLUCOSE BLDC GLUCOMTR-MCNC: 146 MG/DL (ref 70–105)
GLUCOSE BLDC GLUCOMTR-MCNC: 151 MG/DL (ref 70–105)
GLUCOSE BLDC GLUCOMTR-MCNC: 163 MG/DL (ref 70–105)
GLUCOSE SERPL-MCNC: 221 MG/DL (ref 65–99)
HCT VFR BLD AUTO: 28.1 % (ref 34–46.6)
HGB BLD-MCNC: 8.3 G/DL (ref 12–15.9)
HYPOCHROMIA BLD QL: ABNORMAL
LYMPHOCYTES # BLD MANUAL: 32.47 10*3/MM3 (ref 0.7–3.1)
LYMPHOCYTES NFR BLD MANUAL: 3 % (ref 5–12)
MCH RBC QN AUTO: 23.8 PG (ref 26.6–33)
MCHC RBC AUTO-ENTMCNC: 29.7 G/DL (ref 31.5–35.7)
MCV RBC AUTO: 80.2 FL (ref 79–97)
METAMYELOCYTES NFR BLD MANUAL: 6 % (ref 0–0)
MONOCYTES # BLD: 8.12 10*3/MM3 (ref 0.1–0.9)
MYELOCYTES NFR BLD MANUAL: 6 % (ref 0–0)
NEUTROPHILS # BLD AUTO: 127.18 10*3/MM3 (ref 1.7–7)
NEUTROPHILS NFR BLD MANUAL: 38 % (ref 42.7–76)
NEUTS BAND NFR BLD MANUAL: 9 % (ref 0–5)
NRBC SPEC MANUAL: 2 /100 WBC (ref 0–0.2)
PHOSPHATE SERPL-MCNC: 6 MG/DL (ref 2.5–4.5)
PLATELET # BLD AUTO: 300 10*3/MM3 (ref 140–450)
PMV BLD AUTO: 8.1 FL (ref 6–12)
POIKILOCYTOSIS BLD QL SMEAR: ABNORMAL
POTASSIUM SERPL-SCNC: 5.1 MMOL/L (ref 3.5–5.2)
PROMYELOCYTES NFR BLD MANUAL: 1 % (ref 0–0)
PROT SERPL-MCNC: 6.2 G/DL (ref 6–8.5)
RBC # BLD AUTO: 3.5 10*6/MM3 (ref 3.77–5.28)
SCAN SLIDE: NORMAL
SMALL PLATELETS BLD QL SMEAR: ADEQUATE
SODIUM SERPL-SCNC: 134 MMOL/L (ref 136–145)
URATE SERPL-MCNC: 9.5 MG/DL (ref 2.4–5.7)
VARIANT LYMPHS NFR BLD MANUAL: 10 % (ref 19.6–45.3)
VARIANT LYMPHS NFR BLD MANUAL: 2 % (ref 0–5)
WBC MORPH BLD: NORMAL
WBC NRBC COR # BLD: 270.6 10*3/MM3 (ref 3.4–10.8)

## 2022-11-12 PROCEDURE — 99232 SBSQ HOSP IP/OBS MODERATE 35: CPT | Performed by: INTERNAL MEDICINE

## 2022-11-12 PROCEDURE — 25010000002 ENOXAPARIN PER 10 MG: Performed by: NURSE PRACTITIONER

## 2022-11-12 PROCEDURE — 94799 UNLISTED PULMONARY SVC/PX: CPT

## 2022-11-12 PROCEDURE — 94761 N-INVAS EAR/PLS OXIMETRY MLT: CPT

## 2022-11-12 PROCEDURE — 94664 DEMO&/EVAL PT USE INHALER: CPT

## 2022-11-12 PROCEDURE — 82962 GLUCOSE BLOOD TEST: CPT

## 2022-11-12 PROCEDURE — 63710000001 INSULIN LISPRO (HUMAN) PER 5 UNITS: Performed by: NURSE PRACTITIONER

## 2022-11-12 PROCEDURE — 63710000001 INSULIN GLARGINE PER 5 UNITS: Performed by: NURSE PRACTITIONER

## 2022-11-12 PROCEDURE — 94640 AIRWAY INHALATION TREATMENT: CPT

## 2022-11-12 RX ORDER — SODIUM CHLORIDE 9 MG/ML
75 INJECTION, SOLUTION INTRAVENOUS CONTINUOUS
Status: DISCONTINUED | OUTPATIENT
Start: 2022-11-12 | End: 2022-11-17 | Stop reason: HOSPADM

## 2022-11-12 RX ADMIN — ASPIRIN 325 MG: 325 TABLET, COATED ORAL at 08:51

## 2022-11-12 RX ADMIN — INSULIN LISPRO 10 UNITS: 100 INJECTION, SOLUTION INTRAVENOUS; SUBCUTANEOUS at 13:18

## 2022-11-12 RX ADMIN — ALLOPURINOL 100 MG: 100 TABLET ORAL at 21:29

## 2022-11-12 RX ADMIN — ALLOPURINOL 100 MG: 100 TABLET ORAL at 13:18

## 2022-11-12 RX ADMIN — GUAIFENESIN 600 MG: 600 TABLET, EXTENDED RELEASE ORAL at 21:29

## 2022-11-12 RX ADMIN — BUDESONIDE AND FORMOTEROL FUMARATE DIHYDRATE 2 PUFF: 160; 4.5 AEROSOL RESPIRATORY (INHALATION) at 07:18

## 2022-11-12 RX ADMIN — INSULIN LISPRO 10 UNITS: 100 INJECTION, SOLUTION INTRAVENOUS; SUBCUTANEOUS at 08:52

## 2022-11-12 RX ADMIN — ENOXAPARIN SODIUM 40 MG: 100 INJECTION SUBCUTANEOUS at 17:57

## 2022-11-12 RX ADMIN — HYDROCODONE BITARTRATE AND ACETAMINOPHEN 1 TABLET: 7.5; 325 TABLET ORAL at 09:01

## 2022-11-12 RX ADMIN — IMATINIB MESYLATE 400 MG: 400 TABLET, FILM COATED ORAL at 08:50

## 2022-11-12 RX ADMIN — HYDROCODONE BITARTRATE AND ACETAMINOPHEN 1 TABLET: 7.5; 325 TABLET ORAL at 21:29

## 2022-11-12 RX ADMIN — MIRTAZAPINE 30 MG: 15 TABLET, FILM COATED ORAL at 21:29

## 2022-11-12 RX ADMIN — Medication 10 ML: at 08:52

## 2022-11-12 RX ADMIN — ALLOPURINOL 100 MG: 100 TABLET ORAL at 05:10

## 2022-11-12 RX ADMIN — CARVEDILOL 6.25 MG: 6.25 TABLET, FILM COATED ORAL at 17:58

## 2022-11-12 RX ADMIN — INSULIN LISPRO 10 UNITS: 100 INJECTION, SOLUTION INTRAVENOUS; SUBCUTANEOUS at 17:57

## 2022-11-12 RX ADMIN — Medication 10 ML: at 21:30

## 2022-11-12 RX ADMIN — FOLIC ACID 1 MG: 1 TABLET ORAL at 08:51

## 2022-11-12 RX ADMIN — INSULIN LISPRO 2 UNITS: 100 INJECTION, SOLUTION INTRAVENOUS; SUBCUTANEOUS at 08:51

## 2022-11-12 RX ADMIN — SODIUM CHLORIDE 100 ML/HR: 9 INJECTION, SOLUTION INTRAVENOUS at 09:52

## 2022-11-12 RX ADMIN — CARVEDILOL 6.25 MG: 6.25 TABLET, FILM COATED ORAL at 08:52

## 2022-11-12 RX ADMIN — IMATINIB MESYLATE 200 MG: 100 TABLET, FILM COATED ORAL at 08:50

## 2022-11-12 RX ADMIN — ALPRAZOLAM 0.5 MG: 0.5 TABLET ORAL at 21:29

## 2022-11-12 RX ADMIN — BUDESONIDE AND FORMOTEROL FUMARATE DIHYDRATE 2 PUFF: 160; 4.5 AEROSOL RESPIRATORY (INHALATION) at 18:10

## 2022-11-12 RX ADMIN — CITALOPRAM HYDROBROMIDE 10 MG: 20 TABLET ORAL at 08:51

## 2022-11-12 RX ADMIN — GUAIFENESIN 600 MG: 600 TABLET, EXTENDED RELEASE ORAL at 08:51

## 2022-11-12 RX ADMIN — INSULIN GLARGINE 30 UNITS: 100 INJECTION, SOLUTION SUBCUTANEOUS at 08:49

## 2022-11-12 NOTE — PROGRESS NOTES
LOS: 9 days   Admiting Physician- Steve Lemos MD    Reason For Followup:    Chest pain  CML  Diabetes mellitus  Cardiomyopathy      Subjective     Patient is feeling better.  Denies any chest pain    Objective     Blood pressure is slightly low    Review of Systems:   Review of Systems   Constitutional: Negative for chills and fever.   HENT: Negative for ear discharge and nosebleeds.    Eyes: Negative for discharge and redness.   Cardiovascular: Negative for chest pain, orthopnea, palpitations, paroxysmal nocturnal dyspnea and syncope.   Respiratory: Negative for cough, shortness of breath and wheezing.    Endocrine: Negative for heat intolerance.   Skin: Negative for rash.   Musculoskeletal: Negative for arthritis and myalgias.   Gastrointestinal: Negative for abdominal pain, melena, nausea and vomiting.   Genitourinary: Negative for dysuria and hematuria.   Neurological: Negative for dizziness, light-headedness, numbness and tremors.   Psychiatric/Behavioral: Negative for depression. The patient is not nervous/anxious.          Vital Signs  Vitals:    11/12/22 0329 11/12/22 0526 11/12/22 0719 11/12/22 0720   BP: 106/55      BP Location: Right arm      Patient Position: Lying      Pulse: 106  99 100   Resp: 18  18 18   Temp: 98 °F (36.7 °C)      TempSrc: Oral      SpO2: 95%  96% 96%   Weight: 79 kg (174 lb 2.6 oz) 79 kg (174 lb 2.6 oz)     Height:         Wt Readings from Last 1 Encounters:   11/12/22 79 kg (174 lb 2.6 oz)       Intake/Output Summary (Last 24 hours) at 11/12/2022 1405  Last data filed at 11/12/2022 1100  Gross per 24 hour   Intake 480 ml   Output 1200 ml   Net -720 ml     Physical Exam:  Constitutional:       Appearance: Well-developed.   Eyes:      General: No scleral icterus.        Right eye: No discharge.   HENT:      Head: Normocephalic and atraumatic.   Neck:      Thyroid: No thyromegaly.      Lymphadenopathy: No cervical adenopathy.   Pulmonary:      Effort: Pulmonary effort is normal.  No respiratory distress.      Breath sounds: Normal breath sounds. No wheezing. No rales.   Cardiovascular:      Normal rate. Regular rhythm.      No gallop.   Edema:     Peripheral edema absent.   Abdominal:      Tenderness: There is no abdominal tenderness.   Skin:     Findings: No erythema or rash.   Neurological:      Mental Status: Alert and oriented to person, place, and time.         Results Review:   Lab Results (last 24 hours)     Procedure Component Value Units Date/Time    POC Glucose Once [[id number]]  (Abnormal) Collected: 11/12/22 1156    Specimen: Blood Updated: 11/12/22 1157     Glucose 122 mg/dL      Comment: Serial Number: [id number]:  [id number]       POC Glucose Once [[id number]]  (Abnormal) Collected: 11/12/22 0810    Specimen: Blood Updated: 11/12/22 0812     Glucose 151 mg/dL      Comment: Serial Number: [id number]:  [id number]       Manual Differential [[id number]]  (Abnormal) Collected: 11/11/22 2356    Specimen: Blood Updated: 11/12/22 0124     Neutrophil % 38.0 %      Lymphocyte % 10.0 %      Monocyte % 3.0 %      Eosinophil % 3.0 %      Basophil % 5.0 %      Bands %  9.0 %      Metamyelocyte % 6.0 %      Myelocyte % 6.0 %      Promyelocyte % 1.0 %      Atypical Lymphocyte % 2.0 %      Blasts % 17.0 %      Neutrophils Absolute 127.18 10*3/mm3      Lymphocytes Absolute 32.47 10*3/mm3      Monocytes Absolute 8.12 10*3/mm3      Eosinophils Absolute 8.12 10*3/mm3      Basophils Absolute 13.53 10*3/mm3      nRBC 2.0 /100 WBC      Anisocytosis Slight/1+     Hypochromia Slight/1+     Poikilocytes Slight/1+     WBC Morphology Normal     Platelet Estimate Adequate    CBC & Differential [[id number]]  (Abnormal) Collected: 11/11/22 2356    Specimen: Blood Updated: 11/12/22 0124    Narrative:      The following orders were created for panel order CBC & Differential.  Procedure                               Abnormality         Status                     ---------                                -----------         ------                     CBC Auto Differential[[id number]]        Abnormal            Final result               Scan Slide[[id number]]                                       Final result                 Please view results for these tests on the individual orders.    Scan Slide [[id number]] Collected: 11/11/22 2356    Specimen: Blood Updated: 11/12/22 0124     Scan Slide --     Comment: See Manual Differential Results       CBC Auto Differential [[id number]]  (Abnormal) Collected: 11/11/22 2356    Specimen: Blood Updated: 11/12/22 0124     .60 10*3/mm3      RBC 3.50 10*6/mm3      Hemoglobin 8.3 g/dL      Hematocrit 28.1 %      MCV 80.2 fL      MCH 23.8 pg      MCHC 29.7 g/dL      RDW 19.6 %      RDW-SD 53.8 fl      MPV 8.1 fL      Platelets 300 10*3/mm3     Narrative:      Reviewed by Pathologist within the past 30 days on 11/1/22 .Modified report. Previous result was Hemogram on 11/12/2022 at 0046 EST.  The previously reported component NRBC is no longer being reported. Previous result was 0.1 /100 WBC (Reference Range: 0.0-0.2 /100 WBC) on 11/12/2022 at 0046 EST.    Comprehensive Metabolic Panel [[id number]]  (Abnormal) Collected: 11/11/22 2356    Specimen: Blood Updated: 11/12/22 0056     Glucose 221 mg/dL      BUN 55 mg/dL      Creatinine 1.88 mg/dL      Sodium 134 mmol/L      Potassium 5.1 mmol/L      Chloride 97 mmol/L      CO2 25.0 mmol/L      Calcium 8.3 mg/dL      Total Protein 6.2 g/dL      Albumin 3.30 g/dL      ALT (SGPT) 38 U/L      AST (SGOT) 45 U/L      Alkaline Phosphatase 1,460 U/L      Total Bilirubin 0.6 mg/dL      Globulin 2.9 gm/dL      A/G Ratio 1.1 g/dL      BUN/Creatinine Ratio 29.3     Anion Gap 12.0 mmol/L      eGFR 32.9 mL/min/1.73      Comment: National Kidney Foundation and American Society of Nephrology (ASN) Task Force recommended calculation based on the Chronic Kidney Disease Epidemiology Collaboration (CKD-EPI) equation refit without adjustment for race.        Narrative:      GFR Normal >60  Chronic Kidney Disease <60  Kidney Failure <15      Phosphorus [[id number]]  (Abnormal) Collected: 11/11/22 2356    Specimen: Blood Updated: 11/12/22 0043     Phosphorus 6.0 mg/dL     Uric Acid [[id number]]  (Abnormal) Collected: 11/11/22 2356    Specimen: Blood Updated: 11/12/22 0043     Uric Acid 9.5 mg/dL     Uric Acid [[id number]]  (Abnormal) Collected: 11/11/22 1820    Specimen: Blood Updated: 11/11/22 2050     Uric Acid 9.5 mg/dL     POC Glucose Once [[id number]]  (Abnormal) Collected: 11/11/22 2000    Specimen: Blood Updated: 11/11/22 2001     Glucose 229 mg/dL      Comment: Serial Number: [id number]:  [id number]       Creatinine, Urine, Random - Urine, Clean Catch [[id number]] Collected: 11/11/22 1332    Specimen: Urine, Clean Catch Updated: 11/11/22 1957     Creatinine, Urine 22.5 mg/dL     Narrative:      Reference intervals for random urine have not been established.  Clinical usage is dependent upon physician's interpretation in combination with other laboratory tests.       Manual Differential [[id number]]  (Abnormal) Collected: 11/11/22 1820    Specimen: Blood Updated: 11/11/22 1925     Neutrophil % 40.0 %      Lymphocyte % 6.0 %      Eosinophil % 8.0 %      Basophil % 14.0 %      Bands %  5.0 %      Metamyelocyte % 7.0 %      Myelocyte % 5.0 %      Promyelocyte % 2.0 %      Atypical Lymphocyte % 1.0 %      Blasts % 12.0 %      Neutrophils Absolute 134.46 10*3/mm3      Lymphocytes Absolute 20.92 10*3/mm3      Eosinophils Absolute 23.90 10*3/mm3      Basophils Absolute 41.83 10*3/mm3      Anisocytosis Mod/2+     Dacrocytes Slight/1+     Hypochromia Slight/1+     Poikilocytes Slight/1+     Smudge Cells Slight/1+     Platelet Estimate Adequate     Large Platelets Slight/1+    CBC & Differential [[id number]]  (Abnormal) Collected: 11/11/22 1820    Specimen: Blood Updated: 11/11/22 1925    Narrative:      The following orders were created for panel order CBC &  Differential.  Procedure                               Abnormality         Status                     ---------                               -----------         ------                     CBC Auto Differential[[id number]]        Abnormal            Final result               Scan Slide[[id number]]                                       Final result                 Please view results for these tests on the individual orders.    Scan Slide [[id number]] Collected: 11/11/22 1820    Specimen: Blood Updated: 11/11/22 1925     Scan Slide --     Comment: See Manual Differential Results       CBC Auto Differential [[id number]]  (Abnormal) Collected: 11/11/22 1820    Specimen: Blood Updated: 11/11/22 1925     .80 10*3/mm3      RBC 3.80 10*6/mm3      Hemoglobin 8.5 g/dL      Hematocrit 30.0 %      MCV 78.9 fL      MCH 22.3 pg      MCHC 28.3 g/dL      RDW 19.6 %      RDW-SD 56.0 fl      MPV 8.5 fL      Platelets 339 10*3/mm3     Narrative:      Modified report. Previous result was Hemogram on 11/11/2022 at 1835 EST.  The previously reported component NRBC is no longer being reported. Previous result was 0.1 /100 WBC (Reference Range: 0.0-0.2 /100 WBC) on 11/11/2022 at 1835 EST.    Comprehensive Metabolic Panel [[id number]]  (Abnormal) Collected: 11/11/22 1820    Specimen: Blood Updated: 11/11/22 1904     Glucose 320 mg/dL      BUN 53 mg/dL      Creatinine 1.58 mg/dL      Sodium 133 mmol/L      Potassium 6.1 mmol/L      Comment: Result checked    Slight hemolysis detected by analyzer. Results may be affected.        Chloride 99 mmol/L      CO2 23.0 mmol/L      Calcium 8.4 mg/dL      Total Protein 6.6 g/dL      Albumin 3.30 g/dL      ALT (SGPT) 43 U/L      AST (SGOT) 59 U/L      Alkaline Phosphatase 1,532 U/L      Total Bilirubin 0.7 mg/dL      Globulin 3.3 gm/dL      A/G Ratio 1.0 g/dL      BUN/Creatinine Ratio 33.5     Anion Gap 11.0 mmol/L      eGFR 40.5 mL/min/1.73      Comment: National Kidney Foundation and American  Society of Nephrology (ASN) Task Force recommended calculation based on the Chronic Kidney Disease Epidemiology Collaboration (CKD-EPI) equation refit without adjustment for race.       Narrative:      GFR Normal >60  Chronic Kidney Disease <60  Kidney Failure <15      Phosphorus [[id number]]  (Abnormal) Collected: 11/11/22 1820    Specimen: Blood Updated: 11/11/22 1847     Phosphorus 6.2 mg/dL     POC Glucose Once [[id number]]  (Abnormal) Collected: 11/11/22 1753    Specimen: Blood Updated: 11/11/22 1754     Glucose 306 mg/dL      Comment: Serial Number: [id number]:  [id number]           Imaging Results (Last 72 Hours)     ** No results found for the last 72 hours. **        ECG/EMG Results (most recent)     Procedure Component Value Units Date/Time    ECG 12 Lead [[id number]] Resulted: 11/03/22 2110     Updated: 11/03/22 2110    SCANNED - TELEMETRY   [[id number]] Resulted: 11/03/22     Updated: 11/04/22 1024    SCANNED - TELEMETRY   [[id number]] Resulted: 11/03/22     Updated: 11/05/22 0101    ECG 12 Lead Chest Pain [[id number]] Collected: 11/03/22 0938     Updated: 11/06/22 1356     QT Interval 343 ms     Narrative:      HEART RATE= 111  bpm  RR Interval= 538  ms  CA Interval= 152  ms  P Horizontal Axis= -4  deg  P Front Axis= 66  deg  QRSD Interval= 90  ms  QT Interval= 343  ms  QRS Axis= -3  deg  T Wave Axis= 32  deg  - BORDERLINE ECG -  Sinus tachycardia  Low voltage, extremity leads  Consider anterior infarct  When compared with ECG of 31-Oct-2022 13:48:24,  No significant change  Electronically Signed By: Randal Powers) 06-Nov-2022 13:56:08  Date and Time of Study: 2022-11-03 09:38:54    SCANNED - TELEMETRY   [[id number]] Resulted: 11/03/22     Updated: 11/07/22 0657    SCANNED - TELEMETRY   [[id number]] Resulted: 11/03/22     Updated: 11/07/22 0952    SCANNED - TELEMETRY   [[id number]] Resulted: 11/03/22     Updated: 11/07/22 1139    SCANNED - TELEMETRY   [[id number]] Resulted: 11/03/22     Updated:  11/08/22 0625    SCANNED - TELEMETRY   [[id number]] Resulted: 11/03/22     Updated: 11/08/22 0706    SCANNED - TELEMETRY   [[id number]] Resulted: 11/03/22     Updated: 11/08/22 0725    SCANNED - TELEMETRY   [[id number]] Resulted: 11/03/22     Updated: 11/08/22 0902    SCANNED - TELEMETRY   [[id number]] Resulted: 11/03/22     Updated: 11/08/22 1050    SCANNED - TELEMETRY   [[id number]] Resulted: 11/03/22     Updated: 11/08/22 1243    SCANNED - TELEMETRY   [[id number]] Resulted: 11/03/22     Updated: 11/08/22 1317    SCANNED - TELEMETRY   [[id number]] Resulted: 11/03/22     Updated: 11/08/22 1553    SCANNED - TELEMETRY   [[id number]] Resulted: 11/03/22     Updated: 11/08/22 1622    SCANNED - TELEMETRY   [[id number]] Resulted: 11/03/22     Updated: 11/09/22 1503    SCANNED - TELEMETRY   [[id number]] Resulted: 11/03/22     Updated: 11/09/22 1523    SCANNED - TELEMETRY   [[id number]] Resulted: 11/03/22     Updated: 11/09/22 1539    SCANNED - TELEMETRY   [[id number]] Resulted: 11/03/22     Updated: 11/10/22 0516    SCANNED - TELEMETRY   [[id number]] Resulted: 11/03/22     Updated: 11/10/22 0842    SCANNED - TELEMETRY   [[id number]] Resulted: 11/03/22     Updated: 11/10/22 1001    SCANNED - TELEMETRY   [[id number]] Resulted: 11/03/22     Updated: 11/10/22 1317    ECG 12 Lead Rhythm Change [[id number]] Collected: 11/10/22 1604     Updated: 11/10/22 1605     QT Interval 374 ms     Narrative:      HEART RATE= 97  bpm  RR Interval= 620  ms  LA Interval= 162  ms  P Horizontal Axis= 38  deg  P Front Axis= 44  deg  QRSD Interval= 85  ms  QT Interval= 374  ms  QRS Axis= -36  deg  T Wave Axis= 29  deg  - BORDERLINE ECG -  Sinus rhythm  Left axis deviation  Low voltage, extremity leads  Consider anterior infarct  When compared with ECG of 03-Nov-2022 9:38:54,  Significant axis, voltage or hypertrophy change  Electronically Signed By:   Date and Time of Study: 2022-11-10 16:04:20    ECG 12 Lead [[id number]] Collected: 11/11/22 0354     Updated:  11/11/22 0355     QT Interval 375 ms     Narrative:      HEART RATE= 96  bpm  RR Interval= 628  ms  DE Interval= 150  ms  P Horizontal Axis= -17  deg  P Front Axis= 52  deg  QRSD Interval= 86  ms  QT Interval= 375  ms  QRS Axis= -12  deg  T Wave Axis= 36  deg  - ABNORMAL ECG -  Sinus rhythm  Probable left atrial enlargement  Anterior infarct, old  When compared with ECG of 10-Nov-2022 16:04:20,  Significant axis, voltage or hypertrophy change  Electronically Signed By:   Date and Time of Study: 2022-11-11 03:54:31    SCANNED - TELEMETRY   [[id number]] Resulted: 11/03/22     Updated: 11/11/22 1214    SCANNED - TELEMETRY   [[id number]] Resulted: 11/03/22     Updated: 11/11/22 1540        CBC    Results from last 7 days   Lab Units 11/11/22  2356 11/11/22  1820 11/11/22  0055 11/10/22  0343 11/09/22  0840 11/06/22  0045   WBC 10*3/mm3 270.60* 298.80* 283.70* 271.70* 307.70* 240.10*   HEMOGLOBIN g/dL 8.3* 8.5* 9.0* 9.1* 10.0* 8.6*   PLATELETS 10*3/mm3 300 339 340 298 355 449     BMP   Results from last 7 days   Lab Units 11/11/22  2356 11/11/22  1820 11/11/22  1212 11/11/22  0426 11/11/22  0055 11/10/22  0343 11/09/22  0840 11/08/22  2308 11/08/22  1300 11/08/22  0848   SODIUM mmol/L 134* 133* 133*  --  133* 133*  --  133*  --  137   POTASSIUM mmol/L 5.1 6.1* 5.9* 4.9 6.2* 5.2 5.1 5.6*   < > 5.4*   CHLORIDE mmol/L 97* 99 97*  --  97* 96*  --  95*  --  98   CO2 mmol/L 25.0 23.0 25.0  --  25.0 25.0  --  27.0  --  25.0   BUN mg/dL 55* 53* 51*  --  58* 40*  --  43*  --  30*   CREATININE mg/dL 1.88* 1.58* 1.58*  --  1.94* 1.52*  --  1.47*  --  0.86   GLUCOSE mg/dL 221* 320* 129*  --  259* 161*  --  202*  --  142*   MAGNESIUM mg/dL  --   --   --   --   --   --   --   --   --  2.1   PHOSPHORUS mg/dL 6.0* 6.2*  --   --   --   --   --   --   --   --     < > = values in this interval not displayed.     CMP   Results from last 7 days   Lab Units 11/11/22  6016 11/11/22  1820 11/11/22  1212 11/11/22  0426 11/11/22  0055  11/10/22  0343 11/09/22  0840 11/08/22  2308 11/08/22  1300 11/08/22  0848   SODIUM mmol/L 134* 133* 133*  --  133* 133*  --  133*  --  137   POTASSIUM mmol/L 5.1 6.1* 5.9* 4.9 6.2* 5.2 5.1 5.6*   < > 5.4*   CHLORIDE mmol/L 97* 99 97*  --  97* 96*  --  95*  --  98   CO2 mmol/L 25.0 23.0 25.0  --  25.0 25.0  --  27.0  --  25.0   BUN mg/dL 55* 53* 51*  --  58* 40*  --  43*  --  30*   CREATININE mg/dL 1.88* 1.58* 1.58*  --  1.94* 1.52*  --  1.47*  --  0.86   GLUCOSE mg/dL 221* 320* 129*  --  259* 161*  --  202*  --  142*   ALBUMIN g/dL 3.30* 3.30*  --   --   --   --   --   --   --   --    BILIRUBIN mg/dL 0.6 0.7  --   --   --   --   --   --   --   --    ALK PHOS U/L 1,460* 1,532*  --   --   --   --   --   --   --   --    AST (SGOT) U/L 45* 59*  --   --   --   --   --   --   --   --    ALT (SGPT) U/L 38* 43*  --   --   --   --   --   --   --   --     < > = values in this interval not displayed.     Cardiac Studies:  Echo- Results for orders placed during the hospital encounter of 06/29/22    Adult Transthoracic Echo Complete W/ Cont if Necessary Per Protocol    Interpretation Summary  · The left ventricular cavity is mildly dilated.  · Left ventricular wall thickness is consistent with mild concentric hypertrophy.  · Left ventricular ejection fraction appears to be 41 - 45%.  · Left ventricular diastolic function is consistent with (grade Ia w/high LAP) impaired relaxation.    Stress Myoview-  Cath-      Medication Review:   Scheduled Meds:albuterol, 10 mg, Nebulization, Once  allopurinol, 100 mg, Oral, Q8H  aspirin, 325 mg, Oral, Daily  budesonide-formoterol, 2 puff, Inhalation, BID - RT  carvedilol, 6.25 mg, Oral, BID With Meals  citalopram, 10 mg, Oral, Daily  enoxaparin, 40 mg, Subcutaneous, Q24H  fentaNYL, 1 patch, Transdermal, Q72H  First Mouthwash (Magic Mouthwash), 5 mL, Swish & Spit, Q4H  folic acid, 1 mg, Oral, Daily  guaiFENesin, 600 mg, Oral, Q12H  imatinib, 400 mg, Oral, Daily   And  imatinib, 200 mg, Oral,  Daily  insulin glargine, 30 Units, Subcutaneous, Daily  insulin lispro, 10 Units, Subcutaneous, TID AC  insulin lispro, 2-9 Units, Subcutaneous, TID With Meals  mirtazapine, 30 mg, Oral, Nightly  sodium chloride, 250 mL, Intravenous, Once  sodium chloride, 10 mL, Intravenous, Q12H      Continuous Infusions:sodium chloride, 100 mL/hr, Last Rate: 100 mL/hr (11/12/22 6971)      PRN Meds:.•  acetaminophen **OR** acetaminophen **OR** acetaminophen  •  ALPRAZolam  •  aluminum-magnesium hydroxide-simethicone  •  dextrose  •  dextrose  •  glucagon (human recombinant)  •  HYDROcodone-acetaminophen  •  influenza vaccine  •  melatonin  •  nitroglycerin  •  ondansetron **OR** ondansetron  •  sodium chloride  •  sodium chloride      Assessment & Plan   Patient Active Problem List   Diagnosis   • Leukemia not having achieved remission ([facility])   • CML (chronic myelocytic leukemia) ([facility])   • Depression with anxiety   • Right knee pain   • Left leg pain   • Normocytic hypochromic anemia   • History of pulmonary embolism   • Medically noncompliant   • Visual changes   • Type 2 diabetes mellitus with diabetic polyneuropathy, with long-term current use of insulin ([facility])   • Vitamin D deficiency   • Shortness of breath   • Dyspnea   • Tachycardia   • Moderate malnutrition ([facility])   • 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 ([facility])   • Acute diastolic CHF (congestive heart failure) ([facility])   • Menorrhagia   • Tumor lysis syndrome   • Acute respiratory failure with hypoxia ([facility])   • Thrombocytopenia ([facility])   • Hyperuricemia   • NICM (nonischemic cardiomyopathy) ([facility])   • Dyspnea, unspecified type   • 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  Chest pain:    Patient underwent stress test which was negative for ischemia.    2.   Cardiomyopathy:    Patient has mild to moderate left ventricular dysfunction.  Patient is on beta-blocker.  Blood pressure is low vasodilator cannot be added.    3.  Renal dysfunction:    Patient is monitored by nephrology.    4.  CML:    Patient is followed by Dr. Mukherjee.  CML is not in remission.  Patient is on chemotherapy.  Further recommendation as per oncology/hematology.    Juan Antonio Claudio MD  11/12/22  14:05 EST

## 2022-11-12 NOTE — PROGRESS NOTES
[facility] Medicine Services Daily Progress Note    Patient Name: Nieves Mc  : 1975  MRN: [id number]  Primary Care Physician:  Alida Latham APRN  Date of admission: 11/3/2022      Subjective          Brief interim history: 47-year-old female with nonischemic cardiomyopathy, HFpEF, PE, anxiety, T2DM, history of medical noncompliance, depression/anxiety and CML. She was admitted on 11/3/2022, presented to [facility] ED, complaining of chest pain that started at around 9 AM while she was at home with her .  Chest pain is described as dull in nature, radiating to the left with paresthesia.  Cardiac biomarkers with troponin negative x3 and EKG shows sinus tachycardia.  Laboratories notable for [place]BNP of [id number] and chest x-ray showed cardiomegaly. Patient is admitted with atypical chest pain chest pain, rule out for ACS.  Seen in consultation by cardiologist with recommendation and followed by oncologist for CML.     2022: Seen and examined in follow evaluation.  Chest pain-free and resting comfortably in bed.     2022: Seen and examined in follow-up.  Feels and looks better this morning.  No complaints or event.    2022: Seen and examined in follow-up.  Feels slightly better but still dyspneic with activity.  Underwent NST today with result pending      2022  Patient seen and examined  Reported feeling generally weak otherwise no new complaints  Initial plan was to discharge patient today however cardiologist wants to keep patient as she still has bilateral pedal edema  Furosemide frequency was increased and metolazone added    2022  Patient seen and examined  Continues with generalized body weakness  Pedal edema with no significant change despite increased diuretic frequency.  We will continue to monitor    2022  Patient seen and examined  Has no new complaint  Furosemide was increased to 40 mg twice daily, metolazone and Entresto added by  cardiology.  Patient with acute renal injury-most likely due to diuretics  Also noted to have hyperkalemia-due to acute kidney injury and Entresto use  Was given Lokelma  With decreased diuretic to daily and hold metolazone    11/10/2022  Patient seen and examined  No new complaint  Bilateral lower extremity swelling improving   Still on creatinine now stabilized  Change furosemide to 40 mg daily  BMP in the a.m.    11/11/2022  Patient seen and examined  Bilateral lower extremity swelling has improved  Serum creatinine worsening  Furosemide and Entresto discontinued  Nephrologist consulted  BMP in the a.m.    11/12/2022  Patient seen and examined  No new complaint      Objective      Vitals:   Temp:  [97.6 °F (36.4 °C)-98 °F (36.7 °C)] 98 °F (36.7 °C)  Heart Rate:  [] 100  Resp:  [16-18] 18  BP: (106-109)/(55-67) 106/55    Physical Exam  Vitals reviewed.   Constitutional:       Comments: Chronically ill looking   HENT:      Head: Normocephalic.      Nose: Nose normal.      Mouth/Throat:      Mouth: Mucous membranes are moist.   Eyes:      Extraocular Movements: Extraocular movements intact.      Conjunctiva/sclera: Conjunctivae normal.      Pupils: Pupils are equal, round, and reactive to light.   Cardiovascular:      Rate and Rhythm: Normal rate and regular rhythm.   Pulmonary:      Effort: No respiratory distress.   Abdominal:      General: Bowel sounds are normal.      Palpations: Abdomen is soft.      Tenderness: There is no abdominal tenderness.   Musculoskeletal:         General: Normal range of motion.      Cervical back: Neck supple.   Skin:     General: Skin is warm.   Neurological:      Mental Status: She is alert and oriented to person, place, and time.   Psychiatric:         Mood and Affect: Mood normal.          Result Review    Result Review:  I have personally reviewed the results from the time of this admission to 11/12/2022 08:29 EST and agree with these findings:  [x]  Laboratory  []   Microbiology  [x]  Radiology  [x]  EKG/Telemetry   [x]  Cardiology/Vascular   []  Pathology  []  Old records  []  Other:  Most notable findings include:       Assessment & Plan        albuterol, 10 mg, Nebulization, Once  allopurinol, 100 mg, Oral, Q8H  aspirin, 325 mg, Oral, Daily  budesonide-formoterol, 2 puff, Inhalation, BID - RT  carvedilol, 6.25 mg, Oral, BID With Meals  citalopram, 10 mg, Oral, Daily  enoxaparin, 40 mg, Subcutaneous, Q24H  fentaNYL, 1 patch, Transdermal, Q72H  First Mouthwash (Magic Mouthwash), 5 mL, Swish & Spit, Q4H  folic acid, 1 mg, Oral, Daily  guaiFENesin, 600 mg, Oral, Q12H  imatinib, 400 mg, Oral, Daily   And  imatinib, 200 mg, Oral, Daily  insulin glargine, 30 Units, Subcutaneous, Daily  insulin lispro, 10 Units, Subcutaneous, TID AC  insulin lispro, 2-9 Units, Subcutaneous, TID With Meals  mirtazapine, 30 mg, Oral, Nightly  sodium chloride, 250 mL, Intravenous, Once  sodium chloride, 10 mL, Intravenous, Q12H             Active Hospital Problems:  Active Hospital Problems    Diagnosis    • **Chest pain, unspecified type    • Bilateral lower extremity edema    • Acute diastolic CHF (congestive heart failure) ([facility])    • NICM (nonischemic cardiomyopathy) ([facility])    • Type 2 diabetes mellitus with diabetic polyneuropathy, with long-term current use of insulin ([facility])    • Depression with anxiety    • Medically noncompliant    • History of pulmonary embolism    • CML (chronic myelocytic leukemia) ([facility])      Assessment/plan:      Chest pain (atypical)  MI ruled out with cardiac enzymes and EKG  Had stress test 11/6/2022-negative for ischemia  Was seen by cardiologist who recommended no further work-up  Supportive care     Acute on chronic  diastolic heart failure  Nonischemic cardiomyopathy  Left ventricular ejection fraction is normal (Calculated EF = 50%)--noted on stress test  On low-dose beta-blocker, losartan and diuretic  Still with pedal edema and shortness of breath with  exertion  Furosemide increased to 40 mg twice daily, Entresto and metolazone added  Patient with acute renal injury-most likely due to diuretics  Also noted to have hyperkalemia-due to acute kidney injury and Entresto use  Was given lokelma  With decreased diuretic to daily and hold metolazone  Monitor renal function    Acute kidney injury  Prerenal-most likely due to diuretic  Serum creatinine continues to worsening despite discontinuing diuretic and Entresto  Nephrologist following  Uric acid 9.5  ?  Volume depletion and underlying tumor lysis syndrome  Started on IV hydration  Also started on allopurinol  BMP in the a.m.    Hyperkalemia-  Most likely due to acute kidney injury/Entresto use  Improved with  hyperkalemia protocol       History of pulmonary embolus        Diabetes mellitus type 2  Continue on insulin regimen  Monitor blood sugar and adjust insulin as needed        CML  On Gleevec/hydroxyurea  Oncologist following     Anxiety/depression  On Remeron/Celexa  Was seen by psychiatrist while inpatient     Generalized weakness  Due to underlying comorbidities  Was seen by physical therapy/Occupational Therapy-no skilled therapy needed             DVT prophylaxis:  Medical DVT prophylaxis orders are present.    CODE STATUS:    Level Of Support Discussed With: Patient  Code Status (Patient has no pulse and is not breathing): CPR (Attempt to Resuscitate)  Medical Interventions (Patient has pulse or is breathing): Full Support      Disposition:  I expect patient to be discharged     Electronically signed by Steve Lemos MD, 11/12/22, 08:29 EST.  [facility]ist Team

## 2022-11-12 NOTE — PROGRESS NOTES
Hematology/Oncology Inpatient Progress Note    PATIENT NAME: Nieves Mc  : 1975  MRN: [id number]    CHIEF COMPLAINT: Chest pain    HISTORY OF PRESENT ILLNESS:      Nieves Mc is a 47 y.o. female who presented to [facility] on 11/3/2022 with complaints of chest pain.  Past medical history significant for CML, depression with anxiety, PE, type 2 diabetes mellitus.  Patient reported that around 9 AM the day of presentation she began to have chest pain.  Stated it was accompanied by the loss of hearing in her left ear and left arm numbness.  She reported she has never experienced this previous.  Since being in the emergency department the chest pain has subsided.  She describes the chest pain as dull.  She did not take anything at home to try to alleviate it.  She reported chronic leg swelling but denied any recent weight gain.  She reported shortness of breath.  She reported chronic cough.  She denied any fever or chills.  She also complained of nausea, vomiting and diarrhea. She was most recently seen in the emergency department on 10/31/2022 for abdominal pain at the instruction of her oncologist.  A CT of the abdomen and pelvis was fairly unchanged from prior and showed severe generalized anasarca and severe hepatosplenomegaly.  A CT of the chest was performed to rule out pulmonary embolism which was also negative and therefore the patient was discharged home.     Evaluation in the ED showed a negative troponin less than 0.010, elevated proBNP at 29,519, glucose 411, normal kidney function, elevated alk phos 1826.  WBC elevated at 376.70, hemoglobin 7.8, MCV 82.8, platelets 404,000, ANC 48.97, blasts 66%.  EKG showed sinus tachycardia, chest x-ray showed cardiomegaly and mild pulmonary vascular congestion which is increased in comparison.  The patient received Lasix, regular insulin, Nitrostat and a normal saline 250 mL bolus in the ED.  She was admitted to the hospital for further  evaluation and treatment.     11/03/22  Hematology/Oncology was consulted on a patient known to us and followed in the office by Dr. Lerma for her diagnosis of CML currently on imatinib and hydroxyurea.  Patient was initially seen in consultation in October 2018 while she was inpatient at [facility] with CML.  At that time she was under the care of Dr. Roach and  and was managed on imatinib.  Patient had a repeat bone marrow aspiration and biopsy in December 2018 that was consistent with chronic myeloid leukemia.  BCR ABL positive, chronic phase.  ABL kinase mutational analysis negative.  Patient was initiated on Tasigna in February 2019 but this was discontinued in June 2022 due to diagnosis of cardiomyopathy.  At that time it was determined that the imatinib would be the safer treatment option and she was reinitiated on this.  Her course has been complicated due to noncompliance and difficulty tolerating TKI's.    Flow Cytometry 11/3/22 - increased neutrophilic cells, increased atypical myeloid blasts (13% of leukocytes), aberrant CD56 expression on granulocytes and monocytes, increased basophils (7% of leukocytes)     11/4/22: Imatinib 400 mg daily restarted     11/5/22: Continues Hydrea 2500 mg daily, Imatinib 400 mg daily, allopurinol 100 mg daily. Patient denies chest pain or SOB. Discussed with patient that WBC is 284.9 today. Patient relays that she started her home supply of imatinib yesterday at 400 mg.      She  has a past medical history of Bone pain, COVID-19 virus infection (01/12/2022), Diabetes mellitus (HCC), Extremity pain, Leg pain, Migraine, Pulmonary embolism (HCC), and Vision loss.     PCP: Alida Latham APRN    History of present illness was reviewed and is unchanged from the previous visit. 11/05/22      Subjective    No complaints overnight.    MEDICATIONS:    Scheduled Meds:  albuterol, 10 mg, Nebulization, Once  allopurinol, 100 mg, Oral, Q8H  aspirin, 325 mg, Oral,  "Daily  budesonide-formoterol, 2 puff, Inhalation, BID - RT  carvedilol, 6.25 mg, Oral, BID With Meals  citalopram, 10 mg, Oral, Daily  enoxaparin, 40 mg, Subcutaneous, Q24H  fentaNYL, 1 patch, Transdermal, Q72H  First Mouthwash (Magic Mouthwash), 5 mL, Swish & Spit, Q4H  folic acid, 1 mg, Oral, Daily  guaiFENesin, 600 mg, Oral, Q12H  imatinib, 400 mg, Oral, Daily   And  imatinib, 200 mg, Oral, Daily  insulin glargine, 30 Units, Subcutaneous, Daily  insulin lispro, 10 Units, Subcutaneous, TID AC  insulin lispro, 2-9 Units, Subcutaneous, TID With Meals  mirtazapine, 30 mg, Oral, Nightly  sodium chloride, 250 mL, Intravenous, Once  sodium chloride, 10 mL, Intravenous, Q12H       Continuous Infusions:      PRN Meds:  •  acetaminophen **OR** acetaminophen **OR** acetaminophen  •  ALPRAZolam  •  aluminum-magnesium hydroxide-simethicone  •  dextrose  •  dextrose  •  glucagon (human recombinant)  •  HYDROcodone-acetaminophen  •  influenza vaccine  •  melatonin  •  nitroglycerin  •  ondansetron **OR** ondansetron  •  sodium chloride  •  sodium chloride     ALLERGIES:    Allergies   Allergen Reactions   • Hydromorphone GI Intolerance     dilaudid   • Morphine Nausea And Vomiting   • Propofol Hives     Previously tolerated being premedicated with diphenhydramine and famotadine       Objective    VITALS:   /55 (BP Location: Right arm, Patient Position: Lying)   Pulse 100   Temp 98 °F (36.7 °C) (Oral)   Resp 18   Ht 162.6 cm (64\")   Wt 79 kg (174 lb 2.6 oz)   LMP 05/25/2022 (Approximate)   SpO2 96%   BMI 29.90 kg/m²     PHYSICAL EXAM:     Physical Exam  Constitutional:       Appearance: Normal appearance. She is ill-appearing.      Comments: Frail, temporal muscle wasting   HENT:      Head: Normocephalic and atraumatic.   Eyes:      Pupils: Pupils are equal, round, and reactive to light.   Cardiovascular:      Rate and Rhythm: Normal rate and regular rhythm.      Pulses: Normal pulses.      Heart sounds: No murmur " heard.  Pulmonary:      Effort: Pulmonary effort is normal.      Breath sounds: Normal breath sounds.   Abdominal:      General: There is no distension.      Palpations: Abdomen is soft. There is no mass.      Tenderness: There is no abdominal tenderness.   Musculoskeletal:         General: Normal range of motion.      Cervical back: Normal range of motion.   Skin:     General: Skin is warm.   Neurological:      General: No focal deficit present.      Mental Status: She is alert.   Psychiatric:         Mood and Affect: Mood normal.         RECENT LABS:    Lab Results (last 24 hours)     Procedure Component Value Units Date/Time    POC Glucose Once [[id number]]  (Abnormal) Collected: 11/12/22 0810    Specimen: Blood Updated: 11/12/22 0812     Glucose 151 mg/dL      Comment: Serial Number: [id number]:  [id number]       Manual Differential [[id number]]  (Abnormal) Collected: 11/11/22 2356    Specimen: Blood Updated: 11/12/22 0124     Neutrophil % 38.0 %      Lymphocyte % 10.0 %      Monocyte % 3.0 %      Eosinophil % 3.0 %      Basophil % 5.0 %      Bands %  9.0 %      Metamyelocyte % 6.0 %      Myelocyte % 6.0 %      Promyelocyte % 1.0 %      Atypical Lymphocyte % 2.0 %      Blasts % 17.0 %      Neutrophils Absolute 127.18 10*3/mm3      Lymphocytes Absolute 32.47 10*3/mm3      Monocytes Absolute 8.12 10*3/mm3      Eosinophils Absolute 8.12 10*3/mm3      Basophils Absolute 13.53 10*3/mm3      nRBC 2.0 /100 WBC      Anisocytosis Slight/1+     Hypochromia Slight/1+     Poikilocytes Slight/1+     WBC Morphology Normal     Platelet Estimate Adequate    CBC & Differential [[id number]]  (Abnormal) Collected: 11/11/22 2356    Specimen: Blood Updated: 11/12/22 0124    Narrative:      The following orders were created for panel order CBC & Differential.  Procedure                               Abnormality         Status                     ---------                               -----------         ------                      CBC Auto Differential[[id number]]        Abnormal            Final result               Scan Slide[[id number]]                                       Final result                 Please view results for these tests on the individual orders.    Scan Slide [[id number]] Collected: 11/11/22 2356    Specimen: Blood Updated: 11/12/22 0124     Scan Slide --     Comment: See Manual Differential Results       CBC Auto Differential [[id number]]  (Abnormal) Collected: 11/11/22 2356    Specimen: Blood Updated: 11/12/22 0124     .60 10*3/mm3      RBC 3.50 10*6/mm3      Hemoglobin 8.3 g/dL      Hematocrit 28.1 %      MCV 80.2 fL      MCH 23.8 pg      MCHC 29.7 g/dL      RDW 19.6 %      RDW-SD 53.8 fl      MPV 8.1 fL      Platelets 300 10*3/mm3     Narrative:      Reviewed by Pathologist within the past 30 days on 11/1/22 .Modified report. Previous result was Hemogram on 11/12/2022 at 0046 EST.  The previously reported component NRBC is no longer being reported. Previous result was 0.1 /100 WBC (Reference Range: 0.0-0.2 /100 WBC) on 11/12/2022 at 0046 EST.    Comprehensive Metabolic Panel [[id number]]  (Abnormal) Collected: 11/11/22 2356    Specimen: Blood Updated: 11/12/22 0056     Glucose 221 mg/dL      BUN 55 mg/dL      Creatinine 1.88 mg/dL      Sodium 134 mmol/L      Potassium 5.1 mmol/L      Chloride 97 mmol/L      CO2 25.0 mmol/L      Calcium 8.3 mg/dL      Total Protein 6.2 g/dL      Albumin 3.30 g/dL      ALT (SGPT) 38 U/L      AST (SGOT) 45 U/L      Alkaline Phosphatase 1,460 U/L      Total Bilirubin 0.6 mg/dL      Globulin 2.9 gm/dL      A/G Ratio 1.1 g/dL      BUN/Creatinine Ratio 29.3     Anion Gap 12.0 mmol/L      eGFR 32.9 mL/min/1.73      Comment: National Kidney Foundation and American Society of Nephrology (ASN) Task Force recommended calculation based on the Chronic Kidney Disease Epidemiology Collaboration (CKD-EPI) equation refit without adjustment for race.       Narrative:      GFR Normal >60  Chronic  Kidney Disease <60  Kidney Failure <15      Phosphorus [[id number]]  (Abnormal) Collected: 11/11/22 2356    Specimen: Blood Updated: 11/12/22 0043     Phosphorus 6.0 mg/dL     Uric Acid [[id number]]  (Abnormal) Collected: 11/11/22 2356    Specimen: Blood Updated: 11/12/22 0043     Uric Acid 9.5 mg/dL     Uric Acid [[id number]]  (Abnormal) Collected: 11/11/22 1820    Specimen: Blood Updated: 11/11/22 2050     Uric Acid 9.5 mg/dL     POC Glucose Once [[id number]]  (Abnormal) Collected: 11/11/22 2000    Specimen: Blood Updated: 11/11/22 2001     Glucose 229 mg/dL      Comment: Serial Number: [id number]:  [id number]       Creatinine, Urine, Random - Urine, Clean Catch [[id number]] Collected: 11/11/22 1332    Specimen: Urine, Clean Catch Updated: 11/11/22 1957     Creatinine, Urine 22.5 mg/dL     Narrative:      Reference intervals for random urine have not been established.  Clinical usage is dependent upon physician's interpretation in combination with other laboratory tests.       Manual Differential [[id number]]  (Abnormal) Collected: 11/11/22 1820    Specimen: Blood Updated: 11/11/22 1925     Neutrophil % 40.0 %      Lymphocyte % 6.0 %      Eosinophil % 8.0 %      Basophil % 14.0 %      Bands %  5.0 %      Metamyelocyte % 7.0 %      Myelocyte % 5.0 %      Promyelocyte % 2.0 %      Atypical Lymphocyte % 1.0 %      Blasts % 12.0 %      Neutrophils Absolute 134.46 10*3/mm3      Lymphocytes Absolute 20.92 10*3/mm3      Eosinophils Absolute 23.90 10*3/mm3      Basophils Absolute 41.83 10*3/mm3      Anisocytosis Mod/2+     Dacrocytes Slight/1+     Hypochromia Slight/1+     Poikilocytes Slight/1+     Smudge Cells Slight/1+     Platelet Estimate Adequate     Large Platelets Slight/1+    CBC & Differential [[id number]]  (Abnormal) Collected: 11/11/22 1820    Specimen: Blood Updated: 11/11/22 1925    Narrative:      The following orders were created for panel order CBC & Differential.  Procedure                                Abnormality         Status                     ---------                               -----------         ------                     CBC Auto Differential[[id number]]        Abnormal            Final result               Scan Slide[[id number]]                                       Final result                 Please view results for these tests on the individual orders.    Scan Slide [[id number]] Collected: 11/11/22 1820    Specimen: Blood Updated: 11/11/22 1925     Scan Slide --     Comment: See Manual Differential Results       CBC Auto Differential [[id number]]  (Abnormal) Collected: 11/11/22 1820    Specimen: Blood Updated: 11/11/22 1925     .80 10*3/mm3      RBC 3.80 10*6/mm3      Hemoglobin 8.5 g/dL      Hematocrit 30.0 %      MCV 78.9 fL      MCH 22.3 pg      MCHC 28.3 g/dL      RDW 19.6 %      RDW-SD 56.0 fl      MPV 8.5 fL      Platelets 339 10*3/mm3     Narrative:      Modified report. Previous result was Hemogram on 11/11/2022 at 1835 EST.  The previously reported component NRBC is no longer being reported. Previous result was 0.1 /100 WBC (Reference Range: 0.0-0.2 /100 WBC) on 11/11/2022 at 1835 EST.    Comprehensive Metabolic Panel [[id number]]  (Abnormal) Collected: 11/11/22 1820    Specimen: Blood Updated: 11/11/22 1904     Glucose 320 mg/dL      BUN 53 mg/dL      Creatinine 1.58 mg/dL      Sodium 133 mmol/L      Potassium 6.1 mmol/L      Comment: Result checked    Slight hemolysis detected by analyzer. Results may be affected.        Chloride 99 mmol/L      CO2 23.0 mmol/L      Calcium 8.4 mg/dL      Total Protein 6.6 g/dL      Albumin 3.30 g/dL      ALT (SGPT) 43 U/L      AST (SGOT) 59 U/L      Alkaline Phosphatase 1,532 U/L      Total Bilirubin 0.7 mg/dL      Globulin 3.3 gm/dL      A/G Ratio 1.0 g/dL      BUN/Creatinine Ratio 33.5     Anion Gap 11.0 mmol/L      eGFR 40.5 mL/min/1.73      Comment: National Kidney Foundation and American Society of Nephrology (ASN) Task Force recommended  calculation based on the Chronic Kidney Disease Epidemiology Collaboration (CKD-EPI) equation refit without adjustment for race.       Narrative:      GFR Normal >60  Chronic Kidney Disease <60  Kidney Failure <15      Phosphorus [[id number]]  (Abnormal) Collected: 11/11/22 1820    Specimen: Blood Updated: 11/11/22 1847     Phosphorus 6.2 mg/dL     POC Glucose Once [[id number]]  (Abnormal) Collected: 11/11/22 1753    Specimen: Blood Updated: 11/11/22 1754     Glucose 306 mg/dL      Comment: Serial Number: [id number]:  [id number]       Sodium, Urine, Random - Urine, Clean Catch [[id number]] Collected: 11/11/22 1332    Specimen: Urine, Clean Catch Updated: 11/11/22 1358     Sodium, Urine 109 mmol/L     Narrative:      Reference intervals for random urine have not been established.  Clinical usage is dependent upon physician's interpretation in combination with other laboratory tests.       Urinalysis With Culture If Indicated - Urine, Clean Catch [[id number]]  (Normal) Collected: 11/11/22 1332    Specimen: Urine, Clean Catch Updated: 11/11/22 1357     Color, UA Yellow     Appearance, UA Clear     pH, UA 7.0     Specific Gravity, UA 1.011     Glucose, UA Negative     Ketones, UA Negative     Bilirubin, UA Negative     Blood, UA Negative     Protein, UA Negative     Leuk Esterase, UA Negative     Nitrite, UA Negative     Urobilinogen, UA 0.2 E.U./dL    Narrative:      In absence of clinical symptoms, the presence of pyuria, bacteria, and/or nitrites on the urinalysis result does not correlate with infection.  Urine microscopic not indicated.    Basic Metabolic Panel [[id number]]  (Abnormal) Collected: 11/11/22 1212    Specimen: Blood Updated: 11/11/22 1245     Glucose 129 mg/dL      BUN 51 mg/dL      Creatinine 1.58 mg/dL      Sodium 133 mmol/L      Potassium 5.9 mmol/L      Comment: Slight hemolysis detected by analyzer. Results may be affected.        Chloride 97 mmol/L      CO2 25.0 mmol/L      Calcium 8.7 mg/dL       BUN/Creatinine Ratio 32.3     Anion Gap 11.0 mmol/L      eGFR 40.5 mL/min/1.73      Comment: National Kidney Foundation and American Society of Nephrology (ASN) Task Force recommended calculation based on the Chronic Kidney Disease Epidemiology Collaboration (CKD-EPI) equation refit without adjustment for race.       Narrative:      GFR Normal >60  Chronic Kidney Disease <60  Kidney Failure <15      Comprehensive Hematopathology Evaluation (Integrated Oncology) [[id number]] Collected: 11/03/22 1643    Specimen: Blood Updated: 11/11/22 1226     CCV RESULT Comment^Text^TXT     Comment: See Report  DISCLAIMER: REFER TO HARDCOPY OR PDF FOR COMPLETE RESULT.   If synopsis provided, clinical decisions should not be   based on this interfaced synopsis alone.  Performed at:  1 - [facility]  [email or web address] [address]  :  Molly Vega M.D., Ph.D., Phone:  [phone]       Narrative:      Performed at:  1 - [facility]  [address] [address]  :  Molly Vega M.D., Ph.D., Phone:  [phone]    Uric Acid [[id number]]  (Abnormal) Collected: 11/11/22 0426    Specimen: Blood Updated: 11/11/22 1211     Uric Acid 9.6 mg/dL     POC Glucose Once [[id number]]  (Abnormal) Collected: 11/11/22 1147    Specimen: Blood Updated: 11/11/22 1148     Glucose 114 mg/dL      Comment: Serial Number: [id number]:  [id number]             PENDING RESULTS: Flow cytometry    IMAGING REVIEWED:  No radiology results for the last day    Assessment & Plan      ASSESSMENT:    Hematology/Oncology was consulted on a patient known to us and followed in the office by Dr. Lerma for her diagnosis of CML currently on imatinib and hydroxyurea.  Patient has been very noncompliant with treatments.  She is currently on Gleevec 400 mg daily but this was recently increased to 600 mg daily.  She is also supposed to be on hydroxyurea twice  a day.  She has been noncompliant with medication intake and also routine follow-up in the office for ongoing care.  Patient has had progressive shortness of breath and swelling on her lower extremities.    1. CML not in remission: Currently on imatinib with plan to increase dose to 600 mg daily once medication is available.  Hydroxyurea is currently on hold. Patient has been noncompliant with her medicines in the past.  Continue Gleevec 600 mg p.o. daily. Improving WBC to 270.  2. Oral lesion likely leukemic cells improving on Gleevec continue the same  3. Acute kidney injury: Nephrologist on board.  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  On allopurinol  4. Hyperkalemia: likely secondary to entresto/ANAYA/TLS  5. Anemia: Secondary to CML and TKI, hydroxyurea.    6. Hyperleukocytosis: Secondary to CML, beginning to improve on Gleevec  7. History of pulmonary embolism: On Xarelto as an outpatient.  Lovenox currently.  8. Acute on chronic diastolic heart failure: Management per primary team and cardiology.  9. Multiple chronic conditions: Type 2 diabetes mellitus, hypertension, depression with anxiety.     PLANS:  1. Continue Daily CBCs  2. Check mag level, phosphorus and daily uric acid  3. Reviewed results of flow cytometry  4. Continue Gleevec to 600 mg p.o. daily  5. She has been seen by psychiatrist and currently on Celexa 10 mg daily.  Remeron increased to 30 mg at night.  Outpatient counseling has been recommended  6. Continue Allopurinol, UA 9.5, will consider rasburicase, discuss with Nephrology.

## 2022-11-12 NOTE — PLAN OF CARE
Problem: Adult Inpatient Plan of Care  Goal: Plan of Care Review  Outcome: Ongoing, Progressing  Flowsheets (Taken 11/12/2022 1616)  Progress: improving  Plan of Care Reviewed With: patient  Outcome Evaluation: NS initiated per kidney. gave pain prn meds to help pain. will continue to monitor.

## 2022-11-12 NOTE — PROGRESS NOTES
"NEPHROLOGY PROGRESS NOTE------[facility]  (Covering for Dr Andre)    [facility]  [phone]  Bart Zambrano MD      Patient Care Team:  Alida Latham APRN as PCP - General (Nurse Practitioner)  Meghann Lerma MD as Consulting Physician (Hematology and Oncology)      Provider:  Bart Zambrano MD  Patient Name: Nieves Mc  :  1975    SUBJECTIVE:  F/u ANAYA/Hyperkalemia  No chest pain or SOA    Medication:  albuterol, 10 mg, Nebulization, Once  allopurinol, 100 mg, Oral, Q8H  aspirin, 325 mg, Oral, Daily  budesonide-formoterol, 2 puff, Inhalation, BID - RT  carvedilol, 6.25 mg, Oral, BID With Meals  citalopram, 10 mg, Oral, Daily  enoxaparin, 40 mg, Subcutaneous, Q24H  fentaNYL, 1 patch, Transdermal, Q72H  First Mouthwash (Magic Mouthwash), 5 mL, Swish & Spit, Q4H  folic acid, 1 mg, Oral, Daily  guaiFENesin, 600 mg, Oral, Q12H  imatinib, 400 mg, Oral, Daily   And  imatinib, 200 mg, Oral, Daily  insulin glargine, 30 Units, Subcutaneous, Daily  insulin lispro, 10 Units, Subcutaneous, TID AC  insulin lispro, 2-9 Units, Subcutaneous, TID With Meals  mirtazapine, 30 mg, Oral, Nightly  sodium chloride, 250 mL, Intravenous, Once  sodium chloride, 10 mL, Intravenous, Q12H      sodium chloride, 100 mL/hr        OBJECTIVE    Vital Sign Min/Max for last 24 hours  Temp  Min: 97.6 °F (36.4 °C)  Max: 98 °F (36.7 °C)   BP  Min: 106/55  Max: 109/67   Pulse  Min: 87  Max: 106   Resp  Min: 16  Max: 18   SpO2  Min: 95 %  Max: 96 %   No data recorded   Weight  Min: 79 kg (174 lb 2.6 oz)  Max: 79 kg (174 lb 2.6 oz)     Flowsheet Rows    Flowsheet Row First Filed Value   Admission Height 162.6 cm (64\") Documented at 2022   Admission Weight 68 kg (150 lb) Documented at 2022          No intake/output data recorded.  I/O last 3 completed shifts:  In: 480 [P.O.:480]  Out: 600 [Urine:600]    Physical Exam:  General Appearance: alert, " appears stated age and cooperative  Head: normocephalic, without obvious abnormality and atraumatic  Eyes: conjunctivae and sclerae normal and no icterus  Neck: supple and no JVD  Lungs: clear to auscultation and respirations regular  Heart: regular rhythm & normal rate and normal S1, S2  Chest: Wall no abnormalities observed  Abdomen: normal bowel sounds and soft, nontender  Extremities: moves extremities well, trace edema, no cyanosis and no redness  Skin: no bleeding, bruising or rash, turgor normal, color normal and no lesions noted  Neurologic: Alert, and oriented. No focal deficits    Labs:    WBC WBC   Date Value Ref Range Status   11/11/2022 270.60 (C) 3.40 - 10.80 10*3/mm3 Final   11/11/2022 298.80 (C) 3.40 - 10.80 10*3/mm3 Final   11/11/2022 283.70 (C) 3.40 - 10.80 10*3/mm3 Final   11/10/2022 271.70 (C) 3.40 - 10.80 10*3/mm3 Final      HGB Hemoglobin   Date Value Ref Range Status   11/11/2022 8.3 (L) 12.0 - 15.9 g/dL Final   11/11/2022 8.5 (L) 12.0 - 15.9 g/dL Final   11/11/2022 9.0 (L) 12.0 - 15.9 g/dL Final   11/10/2022 9.1 (L) 12.0 - 15.9 g/dL Final      HCT Hematocrit   Date Value Ref Range Status   11/11/2022 28.1 (L) 34.0 - 46.6 % Final   11/11/2022 30.0 (L) 34.0 - 46.6 % Final   11/11/2022 30.6 (L) 34.0 - 46.6 % Final   11/10/2022 30.2 (L) 34.0 - 46.6 % Final      Platelets No results found for: LABPLAT   MCV MCV   Date Value Ref Range Status   11/11/2022 80.2 79.0 - 97.0 fL Final   11/11/2022 78.9 (L) 79.0 - 97.0 fL Final   11/11/2022 80.9 79.0 - 97.0 fL Final   11/10/2022 80.3 79.0 - 97.0 fL Final          Sodium Sodium   Date Value Ref Range Status   11/11/2022 134 (L) 136 - 145 mmol/L Final   11/11/2022 133 (L) 136 - 145 mmol/L Final   11/11/2022 133 (L) 136 - 145 mmol/L Final   11/11/2022 133 (L) 136 - 145 mmol/L Final   11/10/2022 133 (L) 136 - 145 mmol/L Final      Potassium Potassium   Date Value Ref Range Status   11/11/2022 5.1 3.5 - 5.2 mmol/L Final   11/11/2022 6.1 (C) 3.5 - 5.2 mmol/L  Final     Comment:     Result checked    Slight hemolysis detected by analyzer. Results may be affected.   11/11/2022 5.9 (H) 3.5 - 5.2 mmol/L Final     Comment:     Slight hemolysis detected by analyzer. Results may be affected.   11/11/2022 4.9 3.5 - 5.2 mmol/L Final   11/11/2022 6.2 (C) 3.5 - 5.2 mmol/L Final   11/10/2022 5.2 3.5 - 5.2 mmol/L Final      Chloride Chloride   Date Value Ref Range Status   11/11/2022 97 (L) 98 - 107 mmol/L Final   11/11/2022 99 98 - 107 mmol/L Final   11/11/2022 97 (L) 98 - 107 mmol/L Final   11/11/2022 97 (L) 98 - 107 mmol/L Final   11/10/2022 96 (L) 98 - 107 mmol/L Final      CO2 CO2   Date Value Ref Range Status   11/11/2022 25.0 22.0 - 29.0 mmol/L Final   11/11/2022 23.0 22.0 - 29.0 mmol/L Final   11/11/2022 25.0 22.0 - 29.0 mmol/L Final   11/11/2022 25.0 22.0 - 29.0 mmol/L Final   11/10/2022 25.0 22.0 - 29.0 mmol/L Final      BUN BUN   Date Value Ref Range Status   11/11/2022 55 (H) 6 - 20 mg/dL Final   11/11/2022 53 (H) 6 - 20 mg/dL Final   11/11/2022 51 (H) 6 - 20 mg/dL Final   11/11/2022 58 (H) 6 - 20 mg/dL Final   11/10/2022 40 (H) 6 - 20 mg/dL Final      Creatinine Creatinine   Date Value Ref Range Status   11/11/2022 1.88 (H) 0.57 - 1.00 mg/dL Final   11/11/2022 1.58 (H) 0.57 - 1.00 mg/dL Final   11/11/2022 1.58 (H) 0.57 - 1.00 mg/dL Final   11/11/2022 1.94 (H) 0.57 - 1.00 mg/dL Final   11/10/2022 1.52 (H) 0.57 - 1.00 mg/dL Final      Calcium Calcium   Date Value Ref Range Status   11/11/2022 8.3 (L) 8.6 - 10.5 mg/dL Final   11/11/2022 8.4 (L) 8.6 - 10.5 mg/dL Final   11/11/2022 8.7 8.6 - 10.5 mg/dL Final   11/11/2022 8.4 (L) 8.6 - 10.5 mg/dL Final   11/10/2022 8.2 (L) 8.6 - 10.5 mg/dL Final      PO4 No components found for: PO4   Albumin Albumin   Date Value Ref Range Status   11/11/2022 3.30 (L) 3.50 - 5.20 g/dL Final   11/11/2022 3.30 (L) 3.50 - 5.20 g/dL Final      Magnesium No results found for: MG   Uric Acid No components found for: URIC ACID     Imaging Results  (Last 72 Hours)     ** No results found for the last 72 hours. **          Results for orders placed during the hospital encounter of 11/03/22    XR Chest 1 View    Narrative  DATE OF EXAM:  11/3/2022 10:16 AM    PROCEDURE:  XR CHEST 1 VW-    INDICATIONS:  Chest Pain Protocol    COMPARISON:  10/31/2022 and prior    TECHNIQUE:  Portable Chest    FINDINGS:    Study is limited by overlying support apparatus. Lung volumes are low.  There is cardiomegaly which is significant change when accounting for  differences in inspiratory volume. Mild pulmonary vascular congestion is  noted. Increased hazy and interstitial opacities are noted diffusely  which are accentuated by low lung volumes. No obvious pleural effusion  noted. Osseous structures are unremarkable    Impression  IMPRESSION :  Cardiomegaly and mild pulmonary vascular congestion which is increase in  the comparison. This may be in part artifactual from low lung volumes  versus underlying pulmonary edema or pneumonia in the correct clinical  setting[    Electronically Signed By-Freddy Toribio On:11/3/2022 10:26 AM  This report was finalized on [id number] by  Freddy Toribio, .      Results for orders placed during the hospital encounter of 10/31/22    XR Chest 1 View    Narrative  DATE OF EXAM:  10/31/2022 2:11 PM    PROCEDURE:  XR CHEST 1 VW-    INDICATIONS:  shortness of breath    COMPARISON:  09/22/2022, 07/27/2022    TECHNIQUE:  [Portable chest radiograph]    FINDINGS:  Mild hazy groundglass infiltrates are seen throughout the lungs  bilaterally with mild interstitial changes. The findings have improved.  Cardiomegaly is observed. The mediastinum is stable. No acute osseous  abnormalities are seen.    Impression  1.     Improved aeration throughout the lungs bilaterally. There are  mild residual hazy groundglass densities throughout the lungs with mild  interstitial changes. These finds suggest mild underlying chronic  pulmonary edema. Stable  cardiomegaly is noted.    Electronically Signed By-Amador Marquez MD On:10/31/2022 2:34 PM  This report was finalized on [id number] by  Amador Marquez MD.      Results for orders placed during the hospital encounter of 09/22/22    XR Chest PA & Lateral    Narrative  XR CHEST PA AND LATERAL-    Date of Exam: 9/22/2022 11:53 AM    Indication: SOA and Cough; C92.10-Chronic myeloid leukemia,  BCR/ABL-positive, not having achieved remission.    Comparison: CTA chest 07/27/2022, AP chest x-ray 07/27/2022.    Technique: Two views of the chest were obtained.    FINDINGS: There are bilateral perihilar and bibasilar airspace  opacities, which have decreased compared to 07/27/2022 chest x-ray. No  definite pleural effusion is seen. Cardiac silhouette remains enlarged.    Impression  1.     Decreased, but not resolved bilateral perihilar and bibasilar  airspace opacities, which may be due to pulmonary edema and/or  multifocal infection.  2.     Stable cardiomegaly.      Electronically Signed By-Sandra Ramon MD On:9/22/2022 12:10 PM  This report was finalized on [id number] by  Sandra Ramon MD.      Results for orders placed during the hospital encounter of 03/11/21    Duplex Venous Lower Extremity - Left    Interpretation Summary  · Normal left lower extremity venous duplex scan.        ASSESSMENT / PLAN      Chest pain, unspecified type    CML (chronic myelocytic leukemia) (HCC)    Depression with anxiety    History of pulmonary embolism    Medically noncompliant    Type 2 diabetes mellitus with diabetic polyneuropathy, with long-term current use of insulin (HCC)    Acute diastolic CHF (congestive heart failure) (HCC)    NICM (nonischemic cardiomyopathy) ([facility])    Bilateral lower extremity edema    • ANAYA--likely pre-renal and or ATN in setting of hypotension/ARBs/diuretics. hold entresto, diuretics. UA neg. Uric acid 9.5  • Hyperkalemia--due to ANAYA/entresto. Hold for now  • DM2  • CML--on chemo  • Chronic diastolic  heart failure--compensated. Given rising CR will hold diuretics for now  • TLS--high tumor burden. Hydrate    Creatinine trending up, potassium okay.  Uric acid 9.5  Suspect volume depletion and also underlying tumor lysis syndrome  Continue holding diuretics, will resume IV hydration and allopurinol  Discussed with Dr. Mukherjee.  Will need rasburicase uric acid trending higher or worsening renal function post hydration.      Bart Zambrano MD  [facility]  [phone]  11/12/22  09:39 EST

## 2022-11-13 ENCOUNTER — APPOINTMENT (OUTPATIENT)
Dept: CT IMAGING | Facility: HOSPITAL | Age: 47
End: 2022-11-13

## 2022-11-13 LAB
ALBUMIN SERPL-MCNC: 3.1 G/DL (ref 3.5–5.2)
ALBUMIN/GLOB SERPL: 1.1 G/DL
ALP SERPL-CCNC: 1296 U/L (ref 39–117)
ALT SERPL W P-5'-P-CCNC: 41 U/L (ref 1–33)
ANION GAP SERPL CALCULATED.3IONS-SCNC: 11 MMOL/L (ref 5–15)
ANISOCYTOSIS BLD QL: ABNORMAL
AST SERPL-CCNC: 44 U/L (ref 1–32)
BASOPHILS # BLD MANUAL: 48.53 10*3/MM3 (ref 0–0.2)
BASOPHILS NFR BLD MANUAL: 19 % (ref 0–1.5)
BILIRUB SERPL-MCNC: 0.5 MG/DL (ref 0–1.2)
BLASTS NFR BLD MANUAL: 12 % (ref 0–0)
BUN SERPL-MCNC: 42 MG/DL (ref 6–20)
BUN/CREAT SERPL: 36.8 (ref 7–25)
CALCIUM SPEC-SCNC: 8.2 MG/DL (ref 8.6–10.5)
CHLORIDE SERPL-SCNC: 101 MMOL/L (ref 98–107)
CO2 SERPL-SCNC: 23 MMOL/L (ref 22–29)
CREAT SERPL-MCNC: 1.14 MG/DL (ref 0.57–1)
DEPRECATED RDW RBC AUTO: 53.4 FL (ref 37–54)
EGFRCR SERPLBLD CKD-EPI 2021: 59.9 ML/MIN/1.73
EOSINOPHIL # BLD MANUAL: 15.32 10*3/MM3 (ref 0–0.4)
EOSINOPHIL NFR BLD MANUAL: 6 % (ref 0.3–6.2)
ERYTHROCYTE [DISTWIDTH] IN BLOOD BY AUTOMATED COUNT: 19.6 % (ref 12.3–15.4)
GLOBULIN UR ELPH-MCNC: 2.9 GM/DL
GLUCOSE BLDC GLUCOMTR-MCNC: 164 MG/DL (ref 70–105)
GLUCOSE BLDC GLUCOMTR-MCNC: 179 MG/DL (ref 70–105)
GLUCOSE BLDC GLUCOMTR-MCNC: 201 MG/DL (ref 70–105)
GLUCOSE BLDC GLUCOMTR-MCNC: 209 MG/DL (ref 70–105)
GLUCOSE SERPL-MCNC: 190 MG/DL (ref 65–99)
HCT VFR BLD AUTO: 27.1 % (ref 34–46.6)
HGB BLD-MCNC: 7.8 G/DL (ref 12–15.9)
LARGE PLATELETS: ABNORMAL
LYMPHOCYTES # BLD MANUAL: 2.55 10*3/MM3 (ref 0.7–3.1)
MCH RBC QN AUTO: 23.1 PG (ref 26.6–33)
MCHC RBC AUTO-ENTMCNC: 28.8 G/DL (ref 31.5–35.7)
MCV RBC AUTO: 80.2 FL (ref 79–97)
METAMYELOCYTES NFR BLD MANUAL: 5 % (ref 0–0)
MYELOCYTES NFR BLD MANUAL: 3 % (ref 0–0)
NEUTROPHILS # BLD AUTO: 125.15 10*3/MM3 (ref 1.7–7)
NEUTROPHILS NFR BLD MANUAL: 40 % (ref 42.7–76)
NEUTS BAND NFR BLD MANUAL: 9 % (ref 0–5)
PHOSPHATE SERPL-MCNC: 4.6 MG/DL (ref 2.5–4.5)
PLATELET # BLD AUTO: 278 10*3/MM3 (ref 140–450)
PMV BLD AUTO: 8 FL (ref 6–12)
POIKILOCYTOSIS BLD QL SMEAR: ABNORMAL
POLYCHROMASIA BLD QL SMEAR: ABNORMAL
POTASSIUM SERPL-SCNC: 5 MMOL/L (ref 3.5–5.2)
PROMYELOCYTES NFR BLD MANUAL: 5 % (ref 0–0)
PROT SERPL-MCNC: 6 G/DL (ref 6–8.5)
QT INTERVAL: 374 MS
RBC # BLD AUTO: 3.38 10*6/MM3 (ref 3.77–5.28)
SCAN SLIDE: NORMAL
SODIUM SERPL-SCNC: 135 MMOL/L (ref 136–145)
URATE SERPL-MCNC: 8.4 MG/DL (ref 2.4–5.7)
VARIANT LYMPHS NFR BLD MANUAL: 1 % (ref 19.6–45.3)
WBC MORPH BLD: NORMAL
WBC NRBC COR # BLD: 255.4 10*3/MM3 (ref 3.4–10.8)

## 2022-11-13 PROCEDURE — 80053 COMPREHEN METABOLIC PANEL: CPT | Performed by: INTERNAL MEDICINE

## 2022-11-13 PROCEDURE — 99233 SBSQ HOSP IP/OBS HIGH 50: CPT | Performed by: INTERNAL MEDICINE

## 2022-11-13 PROCEDURE — 63710000001 INSULIN GLARGINE PER 5 UNITS: Performed by: NURSE PRACTITIONER

## 2022-11-13 PROCEDURE — 25010000002 ENOXAPARIN PER 10 MG: Performed by: NURSE PRACTITIONER

## 2022-11-13 PROCEDURE — 85007 BL SMEAR W/DIFF WBC COUNT: CPT | Performed by: NURSE PRACTITIONER

## 2022-11-13 PROCEDURE — 63710000001 INSULIN LISPRO (HUMAN) PER 5 UNITS: Performed by: NURSE PRACTITIONER

## 2022-11-13 PROCEDURE — 94799 UNLISTED PULMONARY SVC/PX: CPT

## 2022-11-13 PROCEDURE — 84100 ASSAY OF PHOSPHORUS: CPT | Performed by: INTERNAL MEDICINE

## 2022-11-13 PROCEDURE — 85025 COMPLETE CBC W/AUTO DIFF WBC: CPT | Performed by: NURSE PRACTITIONER

## 2022-11-13 PROCEDURE — 71250 CT THORAX DX C-: CPT

## 2022-11-13 PROCEDURE — 99232 SBSQ HOSP IP/OBS MODERATE 35: CPT | Performed by: INTERNAL MEDICINE

## 2022-11-13 PROCEDURE — 82962 GLUCOSE BLOOD TEST: CPT

## 2022-11-13 PROCEDURE — 84550 ASSAY OF BLOOD/URIC ACID: CPT | Performed by: INTERNAL MEDICINE

## 2022-11-13 RX ADMIN — IMATINIB MESYLATE 200 MG: 100 TABLET, FILM COATED ORAL at 08:17

## 2022-11-13 RX ADMIN — ALLOPURINOL 100 MG: 100 TABLET ORAL at 21:15

## 2022-11-13 RX ADMIN — GUAIFENESIN 600 MG: 600 TABLET, EXTENDED RELEASE ORAL at 08:23

## 2022-11-13 RX ADMIN — SODIUM CHLORIDE 100 ML/HR: 9 INJECTION, SOLUTION INTRAVENOUS at 01:35

## 2022-11-13 RX ADMIN — ALLOPURINOL 100 MG: 100 TABLET ORAL at 06:07

## 2022-11-13 RX ADMIN — MIRTAZAPINE 30 MG: 15 TABLET, FILM COATED ORAL at 21:15

## 2022-11-13 RX ADMIN — GUAIFENESIN 600 MG: 600 TABLET, EXTENDED RELEASE ORAL at 21:15

## 2022-11-13 RX ADMIN — INSULIN LISPRO 10 UNITS: 100 INJECTION, SOLUTION INTRAVENOUS; SUBCUTANEOUS at 18:06

## 2022-11-13 RX ADMIN — Medication 10 ML: at 21:15

## 2022-11-13 RX ADMIN — ENOXAPARIN SODIUM 40 MG: 100 INJECTION SUBCUTANEOUS at 18:07

## 2022-11-13 RX ADMIN — CARVEDILOL 6.25 MG: 6.25 TABLET, FILM COATED ORAL at 08:23

## 2022-11-13 RX ADMIN — IMATINIB MESYLATE 400 MG: 400 TABLET, FILM COATED ORAL at 08:17

## 2022-11-13 RX ADMIN — INSULIN LISPRO 2 UNITS: 100 INJECTION, SOLUTION INTRAVENOUS; SUBCUTANEOUS at 08:23

## 2022-11-13 RX ADMIN — FOLIC ACID 1 MG: 1 TABLET ORAL at 08:23

## 2022-11-13 RX ADMIN — INSULIN LISPRO 4 UNITS: 100 INJECTION, SOLUTION INTRAVENOUS; SUBCUTANEOUS at 12:28

## 2022-11-13 RX ADMIN — FENTANYL 1 PATCH: 25 PATCH, EXTENDED RELEASE TRANSDERMAL at 08:18

## 2022-11-13 RX ADMIN — Medication 10 ML: at 08:23

## 2022-11-13 RX ADMIN — BUDESONIDE AND FORMOTEROL FUMARATE DIHYDRATE 2 PUFF: 160; 4.5 AEROSOL RESPIRATORY (INHALATION) at 17:49

## 2022-11-13 RX ADMIN — INSULIN LISPRO 4 UNITS: 100 INJECTION, SOLUTION INTRAVENOUS; SUBCUTANEOUS at 18:06

## 2022-11-13 RX ADMIN — INSULIN GLARGINE 30 UNITS: 100 INJECTION, SOLUTION SUBCUTANEOUS at 08:17

## 2022-11-13 RX ADMIN — INSULIN LISPRO 10 UNITS: 100 INJECTION, SOLUTION INTRAVENOUS; SUBCUTANEOUS at 12:28

## 2022-11-13 RX ADMIN — ASPIRIN 325 MG: 325 TABLET, COATED ORAL at 08:23

## 2022-11-13 RX ADMIN — ALLOPURINOL 100 MG: 100 TABLET ORAL at 13:57

## 2022-11-13 RX ADMIN — CITALOPRAM HYDROBROMIDE 10 MG: 20 TABLET ORAL at 08:23

## 2022-11-13 RX ADMIN — HYDROCODONE BITARTRATE AND ACETAMINOPHEN 1 TABLET: 7.5; 325 TABLET ORAL at 21:15

## 2022-11-13 RX ADMIN — ALPRAZOLAM 0.5 MG: 0.5 TABLET ORAL at 21:15

## 2022-11-13 RX ADMIN — SODIUM CHLORIDE 100 ML/HR: 9 INJECTION, SOLUTION INTRAVENOUS at 13:57

## 2022-11-13 RX ADMIN — CARVEDILOL 6.25 MG: 6.25 TABLET, FILM COATED ORAL at 18:06

## 2022-11-13 RX ADMIN — HYDROCODONE BITARTRATE AND ACETAMINOPHEN 1 TABLET: 7.5; 325 TABLET ORAL at 13:57

## 2022-11-13 NOTE — PROGRESS NOTES
[facility] Medicine Services Daily Progress Note    Patient Name: Nieves Mc  : 1975  MRN: [id number]  Primary Care Physician:  Alida Latham APRN  Date of admission: 11/3/2022      Subjective          Brief interim history: 47-year-old female with nonischemic cardiomyopathy, HFpEF, PE, anxiety, T2DM, history of medical noncompliance, depression/anxiety and CML. She was admitted on 11/3/2022, presented to [facility] ED, complaining of chest pain that started at around 9 AM while she was at home with her .  Chest pain is described as dull in nature, radiating to the left with paresthesia.  Cardiac biomarkers with troponin negative x3 and EKG shows sinus tachycardia.  Laboratories notable for [place]NP of 369[address] and chest x-ray showed cardiomegaly. Patient is admitted with atypical chest pain chest pain, rule out for ACS.  Seen in consultation by cardiologist with recommendation and followed by oncologist for CML.     2022: Seen and examined in follow evaluation.  Chest pain-free and resting comfortably in bed.     2022: Seen and examined in follow-up.  Feels and looks better this morning.  No complaints or event.    2022: Seen and examined in follow-up.  Feels slightly better but still dyspneic with activity.  Underwent NST today with result pending      2022  Patient seen and examined  Reported feeling generally weak otherwise no new complaints  Initial plan was to discharge patient today however cardiologist wants to keep patient as she still has bilateral pedal edema  Furosemide frequency was increased and metolazone added    2022  Patient seen and examined  Continues with generalized body weakness  Pedal edema with no significant change despite increased diuretic frequency.  We will continue to monitor    2022  Patient seen and examined  Has no new complaint  Furosemide was increased to 40 mg twice daily, metolazone and Entresto added by  cardiology.  Patient with acute renal injury-most likely due to diuretics  Also noted to have hyperkalemia-due to acute kidney injury and Entresto use  Was given Lokelma  With decreased diuretic to daily and hold metolazone    11/10/2022  Patient seen and examined  No new complaint  Bilateral lower extremity swelling improving   Still on creatinine now stabilized  Change furosemide to 40 mg daily  BMP in the a.m.    11/11/2022  Patient seen and examined  Bilateral lower extremity swelling has improved  Serum creatinine worsening  Furosemide and Entresto discontinued  Nephrologist consulted  BMP in the a.m.    11/12/2022  Patient seen and examined  No new complaint    11/13/2022  Patient seen and examined  Complaining of pain on the right breast      Objective      Vitals:   Temp:  [97.4 °F (36.3 °C)-98.9 °F (37.2 °C)] 98.9 °F (37.2 °C)  Heart Rate:  [] 96  Resp:  [16-21] 18  BP: (110-121)/(68-75) 121/72    Physical Exam  Vitals reviewed.   Constitutional:       Comments: Chronically ill looking   HENT:      Head: Normocephalic.      Nose: Nose normal.      Mouth/Throat:      Mouth: Mucous membranes are moist.   Eyes:      Extraocular Movements: Extraocular movements intact.      Conjunctiva/sclera: Conjunctivae normal.      Pupils: Pupils are equal, round, and reactive to light.   Cardiovascular:      Rate and Rhythm: Normal rate and regular rhythm.   Pulmonary:      Effort: No respiratory distress.   Abdominal:      General: Bowel sounds are normal.      Palpations: Abdomen is soft.      Tenderness: There is no abdominal tenderness.   Musculoskeletal:         General: Normal range of motion.      Cervical back: Neck supple.   Skin:     General: Skin is warm.   Neurological:      Mental Status: She is alert and oriented to person, place, and time.   Psychiatric:         Mood and Affect: Mood normal.          Result Review    Result Review:  I have personally reviewed the results from the time of this admission  to 11/13/2022 08:30 EST and agree with these findings:  [x]  Laboratory  []  Microbiology  [x]  Radiology  [x]  EKG/Telemetry   [x]  Cardiology/Vascular   []  Pathology  []  Old records  []  Other:  Most notable findings include:       Assessment & Plan        albuterol, 10 mg, Nebulization, Once  allopurinol, 100 mg, Oral, Q8H  aspirin, 325 mg, Oral, Daily  budesonide-formoterol, 2 puff, Inhalation, BID - RT  carvedilol, 6.25 mg, Oral, BID With Meals  citalopram, 10 mg, Oral, Daily  enoxaparin, 40 mg, Subcutaneous, Q24H  fentaNYL, 1 patch, Transdermal, Q72H  First Mouthwash (Magic Mouthwash), 5 mL, Swish & Spit, Q4H  folic acid, 1 mg, Oral, Daily  guaiFENesin, 600 mg, Oral, Q12H  imatinib, 400 mg, Oral, Daily   And  imatinib, 200 mg, Oral, Daily  insulin glargine, 30 Units, Subcutaneous, Daily  insulin lispro, 10 Units, Subcutaneous, TID AC  insulin lispro, 2-9 Units, Subcutaneous, TID With Meals  mirtazapine, 30 mg, Oral, Nightly  sodium chloride, 250 mL, Intravenous, Once  sodium chloride, 10 mL, Intravenous, Q12H       sodium chloride, 100 mL/hr, Last Rate: 100 mL/hr (11/13/22 0135)         Active Hospital Problems:  Active Hospital Problems    Diagnosis    • **Chest pain, unspecified type    • Bilateral lower extremity edema    • Acute diastolic CHF (congestive heart failure) ([facility])    • NICM (nonischemic cardiomyopathy) ([facility])    • Type 2 diabetes mellitus with diabetic polyneuropathy, with long-term current use of insulin ([facility])    • Depression with anxiety    • Medically noncompliant    • History of pulmonary embolism    • CML (chronic myelocytic leukemia) ([facility])      Assessment/plan:      Chest pain (atypical)  MI ruled out with cardiac enzymes and EKG  Had stress test 11/6/2022-negative for ischemia  Was seen by cardiologist who recommended no further work-up  Supportive care     Acute on chronic  diastolic heart failure  Nonischemic cardiomyopathy  Left ventricular ejection fraction is normal (Calculated EF =  50%)--noted on stress test  On low-dose beta-blocker, losartan and diuretic  Still with pedal edema and shortness of breath with exertion  Furosemide increased to 40 mg twice daily, Entresto and metolazone added  Patient with acute renal injury-most likely due to diuretics  Also noted to have hyperkalemia-due to acute kidney injury and Entresto use  Was given lokelma  Discontinued diuretic and metolazone due to worsening renal function  Monitor renal function    Acute kidney injury  Prerenal-most likely due to diuretic  Serum creatinine continues to worsening despite discontinuing diuretic and Entresto  Nephrologist following  Uric acid 9.5  ?  Volume depletion and underlying tumor lysis syndrome  Started on IV hydration  Also started on allopurinol      Hyperkalemia-  Most likely due to acute kidney injury/Entresto use  Improved with  hyperkalemia protocol       History of pulmonary embolus        Diabetes mellitus type 2  Continue on insulin regimen  Monitor blood sugar and adjust insulin as needed        CML  On Gleevec/hydroxyurea  Oncologist following     Anxiety/depression  On Remeron/Celexa  Was seen by psychiatrist while inpatient     Generalized weakness  Due to underlying comorbidities  Was seen by physical therapy/Occupational Therapy-no skilled therapy needed             DVT prophylaxis:  Medical DVT prophylaxis orders are present.    CODE STATUS:    Level Of Support Discussed With: Patient  Code Status (Patient has no pulse and is not breathing): CPR (Attempt to Resuscitate)  Medical Interventions (Patient has pulse or is breathing): Full Support      Disposition:  I expect patient to be discharged     Electronically signed by Steve Lemos MD, 11/13/22, 08:30 EST.  [facility] Hospitalist Team

## 2022-11-13 NOTE — PROGRESS NOTES
Hematology/Oncology Inpatient Progress Note    PATIENT NAME: Nieves Mc  : 1975  MRN: [id number]    CHIEF COMPLAINT: Chest pain    HISTORY OF PRESENT ILLNESS:      Nieves Mc is a 47 y.o. female who presented to [facility] on 11/3/2022 with complaints of chest pain.  Past medical history significant for CML, depression with anxiety, PE, type 2 diabetes mellitus.  Patient reported that around 9 AM the day of presentation she began to have chest pain.  Stated it was accompanied by the loss of hearing in her left ear and left arm numbness.  She reported she has never experienced this previous.  Since being in the emergency department the chest pain has subsided.  She describes the chest pain as dull.  She did not take anything at home to try to alleviate it.  She reported chronic leg swelling but denied any recent weight gain.  She reported shortness of breath.  She reported chronic cough.  She denied any fever or chills.  She also complained of nausea, vomiting and diarrhea. She was most recently seen in the emergency department on 10/31/2022 for abdominal pain at the instruction of her oncologist.  A CT of the abdomen and pelvis was fairly unchanged from prior and showed severe generalized anasarca and severe hepatosplenomegaly.  A CT of the chest was performed to rule out pulmonary embolism which was also negative and therefore the patient was discharged home.     Evaluation in the ED showed a negative troponin less than 0.010, elevated proBNP at 29,519, glucose 411, normal kidney function, elevated alk phos 1826.  WBC elevated at 376.70, hemoglobin 7.8, MCV 82.8, platelets 404,000, ANC 48.97, blasts 66%.  EKG showed sinus tachycardia, chest x-ray showed cardiomegaly and mild pulmonary vascular congestion which is increased in comparison.  The patient received Lasix, regular insulin, Nitrostat and a normal saline 250 mL bolus in the ED.  She was admitted to the hospital for further  evaluation and treatment.     11/03/22  Hematology/Oncology was consulted on a patient known to us and followed in the office by Dr. Lerma for her diagnosis of CML currently on imatinib and hydroxyurea.  Patient was initially seen in consultation in October 2018 while she was inpatient at [facility] with CML.  At that time she was under the care of Dr. Roach and  and was managed on imatinib.  Patient had a repeat bone marrow aspiration and biopsy in December 2018 that was consistent with chronic myeloid leukemia.  BCR ABL positive, chronic phase.  ABL kinase mutational analysis negative.  Patient was initiated on Tasigna in February 2019 but this was discontinued in June 2022 due to diagnosis of cardiomyopathy.  At that time it was determined that the imatinib would be the safer treatment option and she was reinitiated on this.  Her course has been complicated due to noncompliance and difficulty tolerating TKI's.    Flow Cytometry 11/3/22 - increased neutrophilic cells, increased atypical myeloid blasts (13% of leukocytes), aberrant CD56 expression on granulocytes and monocytes, increased basophils (7% of leukocytes)     11/4/22: Imatinib 400 mg daily restarted     11/5/22: Continues Hydrea 2500 mg daily, Imatinib 400 mg daily, allopurinol 100 mg daily. Patient denies chest pain or SOB. Discussed with patient that WBC is 284.9 today. Patient relays that she started her home supply of imatinib yesterday at 400 mg.      She  has a past medical history of Bone pain, COVID-19 virus infection (01/12/2022), Diabetes mellitus (HCC), Extremity pain, Leg pain, Migraine, Pulmonary embolism (HCC), and Vision loss.     PCP: Alida Latham APRN    History of present illness was reviewed and is unchanged from the previous visit. 11/05/22      Subjective    Has had left chest acute pain with tenderness on palpation.    MEDICATIONS:    Scheduled Meds:  albuterol, 10 mg, Nebulization, Once  allopurinol, 100 mg, Oral,  "Q8H  aspirin, 325 mg, Oral, Daily  budesonide-formoterol, 2 puff, Inhalation, BID - RT  carvedilol, 6.25 mg, Oral, BID With Meals  citalopram, 10 mg, Oral, Daily  enoxaparin, 40 mg, Subcutaneous, Q24H  fentaNYL, 1 patch, Transdermal, Q72H  First Mouthwash (Magic Mouthwash), 5 mL, Swish & Spit, Q4H  folic acid, 1 mg, Oral, Daily  guaiFENesin, 600 mg, Oral, Q12H  imatinib, 400 mg, Oral, Daily   And  imatinib, 200 mg, Oral, Daily  insulin glargine, 30 Units, Subcutaneous, Daily  insulin lispro, 10 Units, Subcutaneous, TID AC  insulin lispro, 2-9 Units, Subcutaneous, TID With Meals  mirtazapine, 30 mg, Oral, Nightly  sodium chloride, 250 mL, Intravenous, Once  sodium chloride, 10 mL, Intravenous, Q12H       Continuous Infusions:  sodium chloride, 100 mL/hr, Last Rate: 100 mL/hr (11/13/22 0135)       PRN Meds:  •  acetaminophen **OR** acetaminophen **OR** acetaminophen  •  ALPRAZolam  •  aluminum-magnesium hydroxide-simethicone  •  dextrose  •  dextrose  •  glucagon (human recombinant)  •  HYDROcodone-acetaminophen  •  influenza vaccine  •  melatonin  •  nitroglycerin  •  ondansetron **OR** ondansetron  •  sodium chloride  •  sodium chloride     ALLERGIES:    Allergies   Allergen Reactions   • Hydromorphone GI Intolerance     dilaudid   • Morphine Nausea And Vomiting   • Propofol Hives     Previously tolerated being premedicated with diphenhydramine and famotadine       Objective    VITALS:   /72 (BP Location: Left arm, Patient Position: Lying)   Pulse 96   Temp 98.9 °F (37.2 °C) (Oral)   Resp 18   Ht 162.6 cm (64\")   Wt 77.8 kg (171 lb 8.3 oz)   LMP 05/25/2022 (Approximate)   SpO2 94%   BMI 29.44 kg/m²     PHYSICAL EXAM:     Physical Exam  Constitutional:       Appearance: Normal appearance. She is ill-appearing.      Comments: Frail, temporal muscle wasting   HENT:      Head: Normocephalic and atraumatic.      Mouth/Throat:      Mouth: Mucous membranes are moist.   Eyes:      Pupils: Pupils are equal, " round, and reactive to light.   Cardiovascular:      Rate and Rhythm: Normal rate and regular rhythm.      Pulses: Normal pulses.      Heart sounds: No murmur heard.  Pulmonary:      Effort: Pulmonary effort is normal.      Breath sounds: Normal breath sounds.   Abdominal:      General: There is no distension.      Palpations: Abdomen is soft. There is no mass.      Tenderness: There is no abdominal tenderness.   Musculoskeletal:         General: Normal range of motion.      Cervical back: Normal range of motion and neck supple.   Skin:     General: Skin is warm.   Neurological:      General: No focal deficit present.      Mental Status: She is alert.   Psychiatric:         Mood and Affect: Mood normal.     chest tenderness    RECENT LABS:    Lab Results (last 24 hours)     Procedure Component Value Units Date/Time    POC Glucose Once [[id number]]  (Abnormal) Collected: 11/13/22 0750    Specimen: Blood Updated: 11/13/22 0751     Glucose 164 mg/dL      Comment: Serial Number: [id number]:  [id number]       Manual Differential [[id number]]  (Abnormal) Collected: 11/13/22 0141    Specimen: Blood Updated: 11/13/22 0302     Neutrophil % 40.0 %      Lymphocyte % 1.0 %      Eosinophil % 6.0 %      Basophil % 19.0 %      Bands %  9.0 %      Metamyelocyte % 5.0 %      Myelocyte % 3.0 %      Promyelocyte % 5.0 %      Blasts % 12.0 %      Neutrophils Absolute 125.15 10*3/mm3      Lymphocytes Absolute 2.55 10*3/mm3      Eosinophils Absolute 15.32 10*3/mm3      Basophils Absolute 48.53 10*3/mm3      Anisocytosis Slight/1+     Poikilocytes Slight/1+     Polychromasia Slight/1+     WBC Morphology Normal     Large Platelets Slight/1+    Narrative:      Reviewed by Pathologist within the past 30 days on 11.01.2022.      CBC & Differential [[id number]]  (Abnormal) Collected: 11/13/22 0141    Specimen: Blood Updated: 11/13/22 0302    Narrative:      The following orders were created for panel order CBC & Differential.  Procedure                                Abnormality         Status                     ---------                               -----------         ------                     CBC Auto Differential[[id number]]        Abnormal            Final result               Scan Slide[[id number]]                                       Final result                 Please view results for these tests on the individual orders.    Scan Slide [[id number]] Collected: 11/13/22 0141    Specimen: Blood Updated: 11/13/22 0302     Scan Slide --     Comment: See Manual Differential Results       CBC Auto Differential [[id number]]  (Abnormal) Collected: 11/13/22 0141    Specimen: Blood Updated: 11/13/22 0302     .40 10*3/mm3      RBC 3.38 10*6/mm3      Hemoglobin 7.8 g/dL      Hematocrit 27.1 %      MCV 80.2 fL      MCH 23.1 pg      MCHC 28.8 g/dL      RDW 19.6 %      RDW-SD 53.4 fl      MPV 8.0 fL      Platelets 278 10*3/mm3     Narrative:      Modified report. Previous result was Hemogram on 11/13/2022 at 0227 EST.  The previously reported component NRBC is no longer being reported. Previous result was 0.1 /100 WBC (Reference Range: 0.0-0.2 /100 WBC) on 11/13/2022 at 0227 EST.    Comprehensive Metabolic Panel [[id number]]  (Abnormal) Collected: 11/13/22 0141    Specimen: Blood Updated: 11/13/22 0241     Glucose 190 mg/dL      BUN 42 mg/dL      Creatinine 1.14 mg/dL      Sodium 135 mmol/L      Potassium 5.0 mmol/L      Chloride 101 mmol/L      CO2 23.0 mmol/L      Calcium 8.2 mg/dL      Total Protein 6.0 g/dL      Albumin 3.10 g/dL      ALT (SGPT) 41 U/L      AST (SGOT) 44 U/L      Alkaline Phosphatase 1,296 U/L      Total Bilirubin 0.5 mg/dL      Globulin 2.9 gm/dL      A/G Ratio 1.1 g/dL      BUN/Creatinine Ratio 36.8     Anion Gap 11.0 mmol/L      eGFR 59.9 mL/min/1.73      Comment: National Kidney Foundation and American Society of Nephrology (ASN) Task Force recommended calculation based on the Chronic Kidney Disease Epidemiology Collaboration  (CKD-EPI) equation refit without adjustment for race.       Narrative:      GFR Normal >60  Chronic Kidney Disease <60  Kidney Failure <15      Uric Acid [[id number]]  (Abnormal) Collected: 11/13/22 0141    Specimen: Blood Updated: 11/13/22 0229     Uric Acid 8.4 mg/dL     Phosphorus [[id number]]  (Abnormal) Collected: 11/13/22 0141    Specimen: Blood Updated: 11/13/22 0229     Phosphorus 4.6 mg/dL     POC Glucose Once [[id number]]  (Abnormal) Collected: 11/12/22 2019    Specimen: Blood Updated: 11/12/22 2021     Glucose 163 mg/dL      Comment: Serial Number: [id number]:  [id number]       POC Glucose Once [[id number]]  (Abnormal) Collected: 11/12/22 1625    Specimen: Blood Updated: 11/12/22 1626     Glucose 146 mg/dL      Comment: Serial Number: [id number]:  [id number]       POC Glucose Once [[id number]]  (Abnormal) Collected: 11/12/22 1156    Specimen: Blood Updated: 11/12/22 1157     Glucose 122 mg/dL      Comment: Serial Number: [id number]:  [id number]             PENDING RESULTS: Flow cytometry    IMAGING REVIEWED:  No radiology results for the last day    Assessment & Plan      ASSESSMENT:    Hematology/Oncology was consulted on a patient known to us and followed in the office by Dr. Lerma for her diagnosis of CML currently on imatinib and hydroxyurea.  Patient has been very noncompliant with treatments.  She is currently on Gleevec 400 mg daily but this was recently increased to 600 mg daily.  She is also supposed to be on hydroxyurea twice a day.  She has been noncompliant with medication intake and also routine follow-up in the office for ongoing care.  Patient has had progressive shortness of breath and swelling on her lower extremities.    1. CML not in remission: Currently on imatinib 600 mg daily.  Hydroxyurea is currently on hold. Patient has been noncompliant with her medicines in the past.  Continue Gleevec 600 mg p.o. daily. Improving WBC to 255  2. Oral lesion likely leukemic cells  improving on Gleevec continue the same  3. Acute kidney injury: Nephrologist on board.  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  On allopurinol creatinine improving.  Today at 1.14,   4. Hyperkalemia: likely secondary to entresto/ANAYA/TLS, now normalized  5. Anemia: Secondary to CML and TKI, hydroxyurea.    6. Hyperleukocytosis: Secondary to CML, beginning to improve on Gleevec  7. History of pulmonary embolism: On Xarelto as an outpatient.  Lovenox currently.  8. Acute on chronic diastolic heart failure: Management per primary team and cardiology.  9. Multiple chronic conditions: Type 2 diabetes mellitus, hypertension, depression with anxiety.  10. Chest wall pain: unclear etiology, CT chest non contrast ordered.     PLANS:  1. Continue Daily CBCs  2. Check mag level, phosphorus and daily uric acid  3. Reviewed results of flow cytometry  4. Continue Gleevec 600 mg p.o. daily  5. She has been seen by psychiatrist and currently on Celexa 10 mg daily.  Remeron increased to 30 mg at night.  Outpatient counseling has been recommended  6. Continue Allopurinol, uric acid now improved to 8.4.

## 2022-11-13 NOTE — PROGRESS NOTES
LOS: 10 days   Admiting Physician- Steve Lemos MD    Reason For Followup:    Chest pain  CML  Diabetes mellitus  Cardiomyopathy      Subjective     Patient is feeling better.  Denies any chest pain    Objective     Blood pressure is slightly low    Review of Systems:   Review of Systems   Constitutional: Negative for chills and fever.   HENT: Negative for ear discharge and nosebleeds.    Eyes: Negative for discharge and redness.   Cardiovascular: Negative for chest pain, orthopnea, palpitations, paroxysmal nocturnal dyspnea and syncope.   Respiratory: Negative for cough, shortness of breath and wheezing.    Endocrine: Negative for heat intolerance.   Skin: Negative for rash.   Musculoskeletal: Negative for arthritis and myalgias.   Gastrointestinal: Negative for abdominal pain, melena, nausea and vomiting.   Genitourinary: Negative for dysuria and hematuria.   Neurological: Negative for dizziness, light-headedness, numbness and tremors.   Psychiatric/Behavioral: Negative for depression. The patient is not nervous/anxious.          Vital Signs  Vitals:    11/12/22 1810 11/12/22 1811 11/12/22 1919 11/13/22 0418   BP:   117/70 121/72   BP Location:   Left arm Left arm   Patient Position:   Lying Lying   Pulse: 103 103 105 96   Resp: 16 16 18 18   Temp:   98.1 °F (36.7 °C) 98.9 °F (37.2 °C)   TempSrc:   Oral Oral   SpO2: 99% 99% 97% 94%   Weight:    77.8 kg (171 lb 8.3 oz)   Height:         Wt Readings from Last 1 Encounters:   11/13/22 77.8 kg (171 lb 8.3 oz)       Intake/Output Summary (Last 24 hours) at 11/13/2022 0851  Last data filed at 11/13/2022 0132  Gross per 24 hour   Intake 1719 ml   Output 800 ml   Net 919 ml     Physical Exam:  Constitutional:       Appearance: Well-developed.   Eyes:      General: No scleral icterus.        Right eye: No discharge.   HENT:      Head: Normocephalic and atraumatic.   Neck:      Thyroid: No thyromegaly.      Lymphadenopathy: No cervical adenopathy.   Pulmonary:       Effort: Pulmonary effort is normal. No respiratory distress.      Breath sounds: Normal breath sounds. No wheezing. No rales.   Cardiovascular:      Normal rate. Regular rhythm.      No gallop.   Edema:     Peripheral edema absent.   Abdominal:      Tenderness: There is no abdominal tenderness.   Skin:     Findings: No erythema or rash.   Neurological:      Mental Status: Alert and oriented to person, place, and time.         Results Review:   Lab Results (last 24 hours)     Procedure Component Value Units Date/Time    POC Glucose Once [[id number]]  (Abnormal) Collected: 11/13/22 0750    Specimen: Blood Updated: 11/13/22 0751     Glucose 164 mg/dL      Comment: Serial Number: [id number]:  [id number]       Manual Differential [[id number]]  (Abnormal) Collected: 11/13/22 0141    Specimen: Blood Updated: 11/13/22 0302     Neutrophil % 40.0 %      Lymphocyte % 1.0 %      Eosinophil % 6.0 %      Basophil % 19.0 %      Bands %  9.0 %      Metamyelocyte % 5.0 %      Myelocyte % 3.0 %      Promyelocyte % 5.0 %      Blasts % 12.0 %      Neutrophils Absolute 125.15 10*3/mm3      Lymphocytes Absolute 2.55 10*3/mm3      Eosinophils Absolute 15.32 10*3/mm3      Basophils Absolute 48.53 10*3/mm3      Anisocytosis Slight/1+     Poikilocytes Slight/1+     Polychromasia Slight/1+     WBC Morphology Normal     Large Platelets Slight/1+    Narrative:      Reviewed by Pathologist within the past 30 days on 11.01.2022.      CBC & Differential [[id number]]  (Abnormal) Collected: 11/13/22 0141    Specimen: Blood Updated: 11/13/22 0302    Narrative:      The following orders were created for panel order CBC & Differential.  Procedure                               Abnormality         Status                     ---------                               -----------         ------                     CBC Auto Differential[[id number]]        Abnormal            Final result               Scan Slide[[id number]]                                        Final result                 Please view results for these tests on the individual orders.    Scan Slide [[id number]] Collected: 11/13/22 0141    Specimen: Blood Updated: 11/13/22 0302     Scan Slide --     Comment: See Manual Differential Results       CBC Auto Differential [[id number]]  (Abnormal) Collected: 11/13/22 0141    Specimen: Blood Updated: 11/13/22 0302     .40 10*3/mm3      RBC 3.38 10*6/mm3      Hemoglobin 7.8 g/dL      Hematocrit 27.1 %      MCV 80.2 fL      MCH 23.1 pg      MCHC 28.8 g/dL      RDW 19.6 %      RDW-SD 53.4 fl      MPV 8.0 fL      Platelets 278 10*3/mm3     Narrative:      Modified report. Previous result was Hemogram on 11/13/2022 at 0227 EST.  The previously reported component NRBC is no longer being reported. Previous result was 0.1 /100 WBC (Reference Range: 0.0-0.2 /100 WBC) on 11/13/2022 at 0227 EST.    Comprehensive Metabolic Panel [[id number]]  (Abnormal) Collected: 11/13/22 0141    Specimen: Blood Updated: 11/13/22 0241     Glucose 190 mg/dL      BUN 42 mg/dL      Creatinine 1.14 mg/dL      Sodium 135 mmol/L      Potassium 5.0 mmol/L      Chloride 101 mmol/L      CO2 23.0 mmol/L      Calcium 8.2 mg/dL      Total Protein 6.0 g/dL      Albumin 3.10 g/dL      ALT (SGPT) 41 U/L      AST (SGOT) 44 U/L      Alkaline Phosphatase 1,296 U/L      Total Bilirubin 0.5 mg/dL      Globulin 2.9 gm/dL      A/G Ratio 1.1 g/dL      BUN/Creatinine Ratio 36.8     Anion Gap 11.0 mmol/L      eGFR 59.9 mL/min/1.73      Comment: National Kidney Foundation and American Society of Nephrology (ASN) Task Force recommended calculation based on the Chronic Kidney Disease Epidemiology Collaboration (CKD-EPI) equation refit without adjustment for race.       Narrative:      GFR Normal >60  Chronic Kidney Disease <60  Kidney Failure <15      Uric Acid [[id number]]  (Abnormal) Collected: 11/13/22 0141    Specimen: Blood Updated: 11/13/22 0229     Uric Acid 8.4 mg/dL     Phosphorus [[id number]]  (Abnormal)  Collected: 11/13/22 0141    Specimen: Blood Updated: 11/13/22 0229     Phosphorus 4.6 mg/dL     POC Glucose Once [[id number]]  (Abnormal) Collected: 11/12/22 2019    Specimen: Blood Updated: 11/12/22 2021     Glucose 163 mg/dL      Comment: Serial Number: [id number]:  [id number]       POC Glucose Once [[id number]]  (Abnormal) Collected: 11/12/22 1625    Specimen: Blood Updated: 11/12/22 1626     Glucose 146 mg/dL      Comment: Serial Number: [id number]:  [id number]       POC Glucose Once [[id number]]  (Abnormal) Collected: 11/12/22 1156    Specimen: Blood Updated: 11/12/22 1157     Glucose 122 mg/dL      Comment: Serial Number: [id number]:  [id number]           Imaging Results (Last 72 Hours)     ** No results found for the last 72 hours. **        ECG/EMG Results (most recent)     Procedure Component Value Units Date/Time    ECG 12 Lead [[id number]] Resulted: 11/03/22 2110     Updated: 11/03/22 2110    SCANNED - TELEMETRY   [[id number]] Resulted: 11/03/22     Updated: 11/04/22 1024    SCANNED - TELEMETRY   [[id number]] Resulted: 11/03/22     Updated: 11/05/22 0101    ECG 12 Lead Chest Pain [[id number]] Collected: 11/03/22 0938     Updated: 11/06/22 1356     QT Interval 343 ms     Narrative:      HEART RATE= 111  bpm  RR Interval= 538  ms  [place] Interval= 152  ms  P Horizontal Axis= -4  deg  P Front Axis= 66  deg  QRSD Interval= 90  ms  QT Interval= 343  ms  QRS Axis= -3  deg  T Wave Axis= 32  deg  - BORDERLINE ECG -  Sinus tachycardia  Low voltage, extremity leads  Consider anterior infarct  When compared with ECG of 31-Oct-2022 13:48:24,  No significant change  Electronically Signed By: Randal PowersMARVIN) 06-Nov-2022 13:56:08  Date and Time of Study: 2022-11-03 09:38:54    SCANNED - TELEMETRY   [[id number]] Resulted: 11/03/22     Updated: 11/07/22 0657    SCANNED - TELEMETRY   [[id number]] Resulted: 11/03/22     Updated: 11/07/22 0952    SCANNED - TELEMETRY   [[id number]] Resulted: 11/03/22     Updated:  11/07/22 1139    SCANNED - TELEMETRY   [[id number]] Resulted: 11/03/22     Updated: 11/08/22 0625    SCANNED - TELEMETRY   [[id number]] Resulted: 11/03/22     Updated: 11/08/22 0706    SCANNED - TELEMETRY   [[id number]] Resulted: 11/03/22     Updated: 11/08/22 0725    SCANNED - TELEMETRY   [[id number]] Resulted: 11/03/22     Updated: 11/08/22 0902    SCANNED - TELEMETRY   [[id number]] Resulted: 11/03/22     Updated: 11/08/22 1050    SCANNED - TELEMETRY   [[id number]] Resulted: 11/03/22     Updated: 11/08/22 1243    SCANNED - TELEMETRY   [[id number]] Resulted: 11/03/22     Updated: 11/08/22 1317    SCANNED - TELEMETRY   [[id number]] Resulted: 11/03/22     Updated: 11/08/22 1553    SCANNED - TELEMETRY   [[id number]] Resulted: 11/03/22     Updated: 11/08/22 1622    SCANNED - TELEMETRY   [[id number]] Resulted: 11/03/22     Updated: 11/09/22 1503    SCANNED - TELEMETRY   [[id number]] Resulted: 11/03/22     Updated: 11/09/22 1523    SCANNED - TELEMETRY   [[id number]] Resulted: 11/03/22     Updated: 11/09/22 1539    SCANNED - TELEMETRY   [[id number]] Resulted: 11/03/22     Updated: 11/10/22 0516    SCANNED - TELEMETRY   [[id number]] Resulted: 11/03/22     Updated: 11/10/22 0842    SCANNED - TELEMETRY   [[id number]] Resulted: 11/03/22     Updated: 11/10/22 1001    SCANNED - TELEMETRY   [[id number]] Resulted: 11/03/22     Updated: 11/10/22 1317    ECG 12 Lead Rhythm Change [[id number]] Collected: 11/10/22 1604     Updated: 11/10/22 1605     QT Interval 374 ms     Narrative:      HEART RATE= 97  bpm  RR Interval= 620  ms  [place] Interval= 162  ms  P Horizontal Axis= 38  deg  P Front Axis= 44  deg  QRSD Interval= 85  ms  QT Interval= 374  ms  QRS Axis= -36  deg  T Wave Axis= 29  deg  - BORDERLINE ECG -  Sinus rhythm  Left axis deviation  Low voltage, extremity leads  Consider anterior infarct  When compared with ECG of 03-Nov-2022 9:38:54,  Significant axis, voltage or hypertrophy change  Electronically Signed By:   Date and Time of Study:  2022-11-10 16:04:20    ECG 12 Lead [[id number]] Collected: 11/11/22 0354     Updated: 11/11/22 0355     QT Interval 375 ms     Narrative:      HEART RATE= 96  bpm  RR Interval= 628  ms  DC Interval= 150  ms  P Horizontal Axis= -17  deg  P Front Axis= 52  deg  QRSD Interval= 86  ms  QT Interval= 375  ms  QRS Axis= -12  deg  T Wave Axis= 36  deg  - ABNORMAL ECG -  Sinus rhythm  Probable left atrial enlargement  Anterior infarct, old  When compared with ECG of 10-Nov-2022 16:04:20,  Significant axis, voltage or hypertrophy change  Electronically Signed By:   Date and Time of Study: 2022-11-11 03:54:31    SCANNED - TELEMETRY   [[id number]] Resulted: 11/03/22     Updated: 11/11/22 1214    SCANNED - TELEMETRY   [[id number]] Resulted: 11/03/22     Updated: 11/11/22 1540        CBC    Results from last 7 days   Lab Units 11/13/22  0141 11/11/22  2356 11/11/22  1820 11/11/22  0055 11/10/22  0343 11/09/22  0840   WBC 10*3/mm3 255.40* 270.60* 298.80* 283.70* 271.70* 307.70*   HEMOGLOBIN g/dL 7.8* 8.3* 8.5* 9.0* 9.1* 10.0*   PLATELETS 10*3/mm3 278 300 339 340 298 355     BMP   Results from last 7 days   Lab Units 11/13/22  0141 11/11/22  2356 11/11/22  1820 11/11/22  1212 11/11/22  0426 11/11/22  0055 11/10/22  0343 11/09/22  0840 11/08/22  2308 11/08/22  1300 11/08/22  0848   SODIUM mmol/L 135* 134* 133* 133*  --  133* 133*  --  133*  --  137   POTASSIUM mmol/L 5.0 5.1 6.1* 5.9* 4.9 6.2* 5.2   < > 5.6*   < > 5.4*   CHLORIDE mmol/L 101 97* 99 97*  --  97* 96*  --  95*  --  98   CO2 mmol/L 23.0 25.0 23.0 25.0  --  25.0 25.0  --  27.0  --  25.0   BUN mg/dL 42* 55* 53* 51*  --  58* 40*  --  43*  --  30*   CREATININE mg/dL 1.14* 1.88* 1.58* 1.58*  --  1.94* 1.52*  --  1.47*  --  0.86   GLUCOSE mg/dL 190* 221* 320* 129*  --  259* 161*  --  202*  --  142*   MAGNESIUM mg/dL  --   --   --   --   --   --   --   --   --   --  2.1   PHOSPHORUS mg/dL 4.6* 6.0* 6.2*  --   --   --   --   --   --   --   --     < > = values in this interval not  displayed.     CMP   Results from last 7 days   Lab Units 11/13/22  0141 11/11/22  2356 11/11/22  1820 11/11/22  1212 11/11/22  0426 11/11/22  0055 11/10/22  0343 11/09/22  0840 11/08/22  2308   SODIUM mmol/L 135* 134* 133* 133*  --  133* 133*  --  133*   POTASSIUM mmol/L 5.0 5.1 6.1* 5.9* 4.9 6.2* 5.2   < > 5.6*   CHLORIDE mmol/L 101 97* 99 97*  --  97* 96*  --  95*   CO2 mmol/L 23.0 25.0 23.0 25.0  --  25.0 25.0  --  27.0   BUN mg/dL 42* 55* 53* 51*  --  58* 40*  --  43*   CREATININE mg/dL 1.14* 1.88* 1.58* 1.58*  --  1.94* 1.52*  --  1.47*   GLUCOSE mg/dL 190* 221* 320* 129*  --  259* 161*  --  202*   ALBUMIN g/dL 3.10* 3.30* 3.30*  --   --   --   --   --   --    BILIRUBIN mg/dL 0.5 0.6 0.7  --   --   --   --   --   --    ALK PHOS U/L 1,296* 1,460* 1,532*  --   --   --   --   --   --    AST (SGOT) U/L 44* 45* 59*  --   --   --   --   --   --    ALT (SGPT) U/L 41* 38* 43*  --   --   --   --   --   --     < > = values in this interval not displayed.     Cardiac Studies:  Echo- Results for orders placed during the hospital encounter of 06/29/22    Adult Transthoracic Echo Complete W/ Cont if Necessary Per Protocol    Interpretation Summary  · The left ventricular cavity is mildly dilated.  · Left ventricular wall thickness is consistent with mild concentric hypertrophy.  · Left ventricular ejection fraction appears to be 41 - 45%.  · Left ventricular diastolic function is consistent with (grade Ia w/high LAP) impaired relaxation.    Stress Myoview-  Cath-      Medication Review:   Scheduled Meds:albuterol, 10 mg, Nebulization, Once  allopurinol, 100 mg, Oral, Q8H  aspirin, 325 mg, Oral, Daily  budesonide-formoterol, 2 puff, Inhalation, BID - RT  carvedilol, 6.25 mg, Oral, BID With Meals  citalopram, 10 mg, Oral, Daily  enoxaparin, 40 mg, Subcutaneous, Q24H  fentaNYL, 1 patch, Transdermal, Q72H  First Mouthwash (Magic Mouthwash), 5 mL, Swish & Spit, Q4H  folic acid, 1 mg, Oral, Daily  guaiFENesin, 600 mg, Oral,  Q12H  imatinib, 400 mg, Oral, Daily   And  imatinib, 200 mg, Oral, Daily  insulin glargine, 30 Units, Subcutaneous, Daily  insulin lispro, 10 Units, Subcutaneous, TID AC  insulin lispro, 2-9 Units, Subcutaneous, TID With Meals  mirtazapine, 30 mg, Oral, Nightly  sodium chloride, 250 mL, Intravenous, Once  sodium chloride, 10 mL, Intravenous, Q12H      Continuous Infusions:sodium chloride, 100 mL/hr, Last Rate: 100 mL/hr (11/13/22 0135)      PRN Meds:.•  acetaminophen **OR** acetaminophen **OR** acetaminophen  •  ALPRAZolam  •  aluminum-magnesium hydroxide-simethicone  •  dextrose  •  dextrose  •  glucagon (human recombinant)  •  HYDROcodone-acetaminophen  •  influenza vaccine  •  melatonin  •  nitroglycerin  •  ondansetron **OR** ondansetron  •  sodium chloride  •  sodium chloride      Assessment & Plan   Patient Active Problem List   Diagnosis   • Leukemia not having achieved remission ([facility])   • CML (chronic myelocytic leukemia) ([facility])   • Depression with anxiety   • Right knee pain   • Left leg pain   • Normocytic hypochromic anemia   • History of pulmonary embolism   • Medically noncompliant   • Visual changes   • Type 2 diabetes mellitus with diabetic polyneuropathy, with long-term current use of insulin ([facility])   • Vitamin D deficiency   • Shortness of breath   • Dyspnea   • Tachycardia   • Moderate malnutrition ([facility])   • 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 ([facility])   • Acute diastolic CHF (congestive heart failure) ([facility])   • Menorrhagia   • Tumor lysis syndrome   • Acute respiratory failure with hypoxia ([facility])   • Thrombocytopenia ([facility])   • Hyperuricemia   • NICM (nonischemic cardiomyopathy) ([facility])   • Dyspnea, unspecified type   • 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  Chest  pain:    Patient underwent stress test which was negative for ischemia.    2.  Cardiomyopathy:    Patient has mild to moderate left ventricular dysfunction.  Patient is on beta-blocker.  Blood pressure is low vasodilator cannot be added.    3.  Renal dysfunction:    Patient is monitored by nephrology.    4.  CML:    Patient is followed by Dr. Mukherjee.  CML is not in remission.  Patient is on chemotherapy.  Further recommendation as per oncology/hematology.    Patient is seen and examined and findings are verified.  All data is reviewed by me personally.  Assessment and plan formulated by APC was done after discussion with attending.  I spent more than 50% of time in taking care of the patient.    Patient is without any chest pain.  Patient is complaining of generalized pain.    Normal S1 and S2.  No pericardial rub or murmur abdominal exam is benign.    Her creatinine is 1.13.  Which is better than 1.8 from yesterday overall condition is improved.  I would discontinue fluids for now.  Patient has LV dysfunction I am concerned that she may not go into congestive heart failure.  No active cardiac issue.  Chest pain is resolved.  Stress test was negative.        Juan Antonio Claudio MD  11/13/22  08:51 EST

## 2022-11-13 NOTE — PLAN OF CARE
Problem: Adult Inpatient Plan of Care  Goal: Plan of Care Review  Outcome: Ongoing, Progressing  Flowsheets (Taken 11/13/2022 1416)  Progress: no change  Plan of Care Reviewed With: patient  Outcome Evaluation: NS rate is 75 currently. c/o right chest pain (under right breast), reported to . chest CT done. gave prn meds to help. will continue to monitor.

## 2022-11-13 NOTE — PROGRESS NOTES
"NEPHROLOGY PROGRESS NOTE------[facility]  (Covering for Dr Andre)    [facility]  [phone]  Bart Zambrano MD      Patient Care Team:  Alida Latham APRN as PCP - General (Nurse Practitioner)  Meghann Lerma MD as Consulting Physician (Hematology and Oncology)      Provider:  Bart Zambrano MD  Patient Name: Nieves Mc  :  1975    SUBJECTIVE:  F/u ANAYA/Hyperkalemia  No chest pain or SOA  Creatinine improved to 1.1 today.  On IV fluid normal saline 100/h    Medication:  albuterol, 10 mg, Nebulization, Once  allopurinol, 100 mg, Oral, Q8H  aspirin, 325 mg, Oral, Daily  budesonide-formoterol, 2 puff, Inhalation, BID - RT  carvedilol, 6.25 mg, Oral, BID With Meals  citalopram, 10 mg, Oral, Daily  enoxaparin, 40 mg, Subcutaneous, Q24H  fentaNYL, 1 patch, Transdermal, Q72H  First Mouthwash (Magic Mouthwash), 5 mL, Swish & Spit, Q4H  folic acid, 1 mg, Oral, Daily  guaiFENesin, 600 mg, Oral, Q12H  imatinib, 400 mg, Oral, Daily   And  imatinib, 200 mg, Oral, Daily  insulin glargine, 30 Units, Subcutaneous, Daily  insulin lispro, 10 Units, Subcutaneous, TID AC  insulin lispro, 2-9 Units, Subcutaneous, TID With Meals  mirtazapine, 30 mg, Oral, Nightly  sodium chloride, 250 mL, Intravenous, Once  sodium chloride, 10 mL, Intravenous, Q12H      sodium chloride, 100 mL/hr, Last Rate: 100 mL/hr (22 1357)        OBJECTIVE    Vital Sign Min/Max for last 24 hours  Temp  Min: 97.4 °F (36.3 °C)  Max: 98.9 °F (37.2 °C)   BP  Min: 111/68  Max: 121/72   Pulse  Min: 96  Max: 105   Resp  Min: 16  Max: 18   SpO2  Min: 94 %  Max: 99 %   No data recorded   Weight  Min: 77.8 kg (171 lb 8.3 oz)  Max: 77.8 kg (171 lb 8.3 oz)     Flowsheet Rows    Flowsheet Row First Filed Value   Admission Height 162.6 cm (64\") Documented at 2022   Admission Weight 68 kg (150 lb) Documented at 2022          I/O this shift:  In: 720 " [P.O.:720]  Out: 1300 [Urine:1300]  I/O last 3 completed shifts:  In: 1959 [P.O.:960; I.V.:999]  Out: 1400 [Urine:1400]    Physical Exam:  General Appearance: alert, appears stated age and cooperative  Head: normocephalic, without obvious abnormality and atraumatic  Eyes: conjunctivae and sclerae normal and no icterus  Neck: supple and no JVD  Lungs: clear to auscultation and respirations regular  Heart: regular rhythm & normal rate and normal S1, S2  Chest: Wall no abnormalities observed  Abdomen: normal bowel sounds and soft, nontender  Extremities: moves extremities well, trace edema, no cyanosis and no redness  Skin: no bleeding, bruising or rash, turgor normal, color normal and no lesions noted  Neurologic: Alert, and oriented. No focal deficits    Labs:    WBC WBC   Date Value Ref Range Status   11/13/2022 255.40 (C) 3.40 - 10.80 10*3/mm3 Final   11/11/2022 270.60 (C) 3.40 - 10.80 10*3/mm3 Final   11/11/2022 298.80 (C) 3.40 - 10.80 10*3/mm3 Final   11/11/2022 283.70 (C) 3.40 - 10.80 10*3/mm3 Final      HGB Hemoglobin   Date Value Ref Range Status   11/13/2022 7.8 (L) 12.0 - 15.9 g/dL Final   11/11/2022 8.3 (L) 12.0 - 15.9 g/dL Final   11/11/2022 8.5 (L) 12.0 - 15.9 g/dL Final   11/11/2022 9.0 (L) 12.0 - 15.9 g/dL Final      HCT Hematocrit   Date Value Ref Range Status   11/13/2022 27.1 (L) 34.0 - 46.6 % Final   11/11/2022 28.1 (L) 34.0 - 46.6 % Final   11/11/2022 30.0 (L) 34.0 - 46.6 % Final   11/11/2022 30.6 (L) 34.0 - 46.6 % Final      Platelets No results found for: LABPLAT   MCV MCV   Date Value Ref Range Status   11/13/2022 80.2 79.0 - 97.0 fL Final   11/11/2022 80.2 79.0 - 97.0 fL Final   11/11/2022 78.9 (L) 79.0 - 97.0 fL Final   11/11/2022 80.9 79.0 - 97.0 fL Final          Sodium Sodium   Date Value Ref Range Status   11/13/2022 135 (L) 136 - 145 mmol/L Final   11/11/2022 134 (L) 136 - 145 mmol/L Final   11/11/2022 133 (L) 136 - 145 mmol/L Final   11/11/2022 133 (L) 136 - 145 mmol/L Final   11/11/2022  133 (L) 136 - 145 mmol/L Final      Potassium Potassium   Date Value Ref Range Status   11/13/2022 5.0 3.5 - 5.2 mmol/L Final   11/11/2022 5.1 3.5 - 5.2 mmol/L Final   11/11/2022 6.1 (C) 3.5 - 5.2 mmol/L Final     Comment:     Result checked    Slight hemolysis detected by analyzer. Results may be affected.   11/11/2022 5.9 (H) 3.5 - 5.2 mmol/L Final     Comment:     Slight hemolysis detected by analyzer. Results may be affected.   11/11/2022 4.9 3.5 - 5.2 mmol/L Final   11/11/2022 6.2 (C) 3.5 - 5.2 mmol/L Final      Chloride Chloride   Date Value Ref Range Status   11/13/2022 101 98 - 107 mmol/L Final   11/11/2022 97 (L) 98 - 107 mmol/L Final   11/11/2022 99 98 - 107 mmol/L Final   11/11/2022 97 (L) 98 - 107 mmol/L Final   11/11/2022 97 (L) 98 - 107 mmol/L Final      CO2 CO2   Date Value Ref Range Status   11/13/2022 23.0 22.0 - 29.0 mmol/L Final   11/11/2022 25.0 22.0 - 29.0 mmol/L Final   11/11/2022 23.0 22.0 - 29.0 mmol/L Final   11/11/2022 25.0 22.0 - 29.0 mmol/L Final   11/11/2022 25.0 22.0 - 29.0 mmol/L Final      BUN BUN   Date Value Ref Range Status   11/13/2022 42 (H) 6 - 20 mg/dL Final   11/11/2022 55 (H) 6 - 20 mg/dL Final   11/11/2022 53 (H) 6 - 20 mg/dL Final   11/11/2022 51 (H) 6 - 20 mg/dL Final   11/11/2022 58 (H) 6 - 20 mg/dL Final      Creatinine Creatinine   Date Value Ref Range Status   11/13/2022 1.14 (H) 0.57 - 1.00 mg/dL Final   11/11/2022 1.88 (H) 0.57 - 1.00 mg/dL Final   11/11/2022 1.58 (H) 0.57 - 1.00 mg/dL Final   11/11/2022 1.58 (H) 0.57 - 1.00 mg/dL Final   11/11/2022 1.94 (H) 0.57 - 1.00 mg/dL Final      Calcium Calcium   Date Value Ref Range Status   11/13/2022 8.2 (L) 8.6 - 10.5 mg/dL Final   11/11/2022 8.3 (L) 8.6 - 10.5 mg/dL Final   11/11/2022 8.4 (L) 8.6 - 10.5 mg/dL Final   11/11/2022 8.7 8.6 - 10.5 mg/dL Final   11/11/2022 8.4 (L) 8.6 - 10.5 mg/dL Final      PO4 No components found for: PO4   Albumin Albumin   Date Value Ref Range Status   11/13/2022 3.10 (L) 3.50 - 5.20 g/dL  Final   11/11/2022 3.30 (L) 3.50 - 5.20 g/dL Final   11/11/2022 3.30 (L) 3.50 - 5.20 g/dL Final      Magnesium No results found for: MG   Uric Acid No components found for: URIC ACID     Imaging Results (Last 72 Hours)     Procedure Component Value Units Date/Time    CT Chest Without Contrast Diagnostic [[id number]] Resulted: 11/13/22 1410     Updated: 11/13/22 1351          Results for orders placed during the hospital encounter of 11/03/22    XR Chest 1 View    Narrative  DATE OF EXAM:  11/3/2022 10:16 AM    PROCEDURE:  XR CHEST 1 VW-    INDICATIONS:  Chest Pain Protocol    COMPARISON:  10/31/2022 and prior    TECHNIQUE:  Portable Chest    FINDINGS:    Study is limited by overlying support apparatus. Lung volumes are low.  There is cardiomegaly which is significant change when accounting for  differences in inspiratory volume. Mild pulmonary vascular congestion is  noted. Increased hazy and interstitial opacities are noted diffusely  which are accentuated by low lung volumes. No obvious pleural effusion  noted. Osseous structures are unremarkable    Impression  IMPRESSION :  Cardiomegaly and mild pulmonary vascular congestion which is increase in  the comparison. This may be in part artifactual from low lung volumes  versus underlying pulmonary edema or pneumonia in the correct clinical  setting[    Electronically Signed By-Freddy Toribio On:11/3/2022 10:26 AM  This report was finalized on [id number] by  Freddy Toribio, .      Results for orders placed during the hospital encounter of 10/31/22    XR Chest 1 View    Narrative  DATE OF EXAM:  10/31/2022 2:11 PM    PROCEDURE:  XR CHEST 1 VW-    INDICATIONS:  shortness of breath    COMPARISON:  09/22/2022, 07/27/2022    TECHNIQUE:  [Portable chest radiograph]    FINDINGS:  Mild hazy groundglass infiltrates are seen throughout the lungs  bilaterally with mild interstitial changes. The findings have improved.  Cardiomegaly is observed. The mediastinum is stable.  No acute osseous  abnormalities are seen.    Impression  1.     Improved aeration throughout the lungs bilaterally. There are  mild residual hazy groundglass densities throughout the lungs with mild  interstitial changes. These finds suggest mild underlying chronic  pulmonary edema. Stable cardiomegaly is noted.    Electronically Signed By-Amador Marquez MD On:10/31/2022 2:34 PM  This report was finalized on [id number] by  Amador Marquez MD.      Results for orders placed during the hospital encounter of 09/22/22    XR Chest PA & Lateral    Narrative  XR CHEST PA AND LATERAL-    Date of Exam: 9/22/2022 11:53 AM    Indication: SOA and Cough; C92.10-Chronic myeloid leukemia,  BCR/ABL-positive, not having achieved remission.    Comparison: CTA chest 07/27/2022, AP chest x-ray 07/27/2022.    Technique: Two views of the chest were obtained.    FINDINGS: There are bilateral perihilar and bibasilar airspace  opacities, which have decreased compared to 07/27/2022 chest x-ray. No  definite pleural effusion is seen. Cardiac silhouette remains enlarged.    Impression  1.     Decreased, but not resolved bilateral perihilar and bibasilar  airspace opacities, which may be due to pulmonary edema and/or  multifocal infection.  2.     Stable cardiomegaly.      Electronically Signed By-Sandra Ramon MD On:9/22/2022 12:10 PM  This report was finalized on [id number] by  Sandra Ramon MD.      Results for orders placed during the hospital encounter of 03/11/21    Duplex Venous Lower Extremity - Left    Interpretation Summary  · Normal left lower extremity venous duplex scan.        ASSESSMENT / PLAN      Chest pain, unspecified type    CML (chronic myelocytic leukemia) (HCC)    Depression with anxiety    History of pulmonary embolism    Medically noncompliant    Type 2 diabetes mellitus with diabetic polyneuropathy, with long-term current use of insulin (HCC)    Acute diastolic CHF (congestive heart failure) (HCC)    NICM  (nonischemic cardiomyopathy) (HCC)    Bilateral lower extremity edema    • ANAYA--likely pre-renal and or ATN in setting of hypotension/ARBs/diuretics. hold entresto, diuretics. UA neg. Uric acid 9.5  • Hyperkalemia--due to ANAYA/entresto. Hold for now  • DM2  • CML--on chemo  • Chronic diastolic heart failure--compensated. Given rising CR will hold diuretics for now  • TLS--high tumor burden. Hydrate    Creatinine better, uric acid down to 8.5  Continue IV fluids, decrease to 75 mill per hour  Continue allopurinol for tumor lysis syndrome.  Monitor renal function fluid status of juanis Zambrano MD  [facility]  [phone]  11/13/22  14:13 EST

## 2022-11-13 NOTE — PLAN OF CARE
Problem: Adult Inpatient Plan of Care  Goal: Absence of Hospital-Acquired Illness or Injury  Intervention: Identify and Manage Fall Risk  Recent Flowsheet Documentation  Taken 11/13/2022 0635 by Judy Ashford, CAMILA  Safety Promotion/Fall Prevention:   assistive device/personal items within reach   clutter free environment maintained   fall prevention program maintained   lighting adjusted   nonskid shoes/slippers when out of bed   room organization consistent   safety round/check completed  Taken 11/13/2022 0452 by Judy Ashford, CAMILA  Safety Promotion/Fall Prevention:   assistive device/personal items within reach   clutter free environment maintained   fall prevention program maintained   lighting adjusted   nonskid shoes/slippers when out of bed   room organization consistent   safety round/check completed  Taken 11/13/2022 0214 by Judy Ashford, CAMILA  Safety Promotion/Fall Prevention:   assistive device/personal items within reach   clutter free environment maintained   fall prevention program maintained   lighting adjusted   nonskid shoes/slippers when out of bed   room organization consistent   safety round/check completed  Taken 11/13/2022 0048 by Judy Ashford, CAMILA  Safety Promotion/Fall Prevention:   assistive device/personal items within reach   clutter free environment maintained   fall prevention program maintained   lighting adjusted   nonskid shoes/slippers when out of bed   room organization consistent   safety round/check completed  Taken 11/12/2022 2220 by Judy Ashford, CAMILA  Safety Promotion/Fall Prevention:   assistive device/personal items within reach   clutter free environment maintained   fall prevention program maintained   lighting adjusted   nonskid shoes/slippers when out of bed   room organization consistent   safety round/check completed  Taken 11/12/2022 2015 by Judy Ashford, CAMILA  Safety Promotion/Fall Prevention:   assistive device/personal items within reach   clutter free environment  maintained   fall prevention program maintained   lighting adjusted   nonskid shoes/slippers when out of bed   room organization consistent   safety round/check completed  Intervention: Prevent Skin Injury  Recent Flowsheet Documentation  Taken 11/13/2022 0635 by Judy Ashford RN  Body Position: turned  Taken 11/13/2022 0452 by Judy Ashford RN  Body Position: turned  Taken 11/13/2022 0214 by Judy Ashford RN  Body Position: turned  Taken 11/13/2022 0048 by Judy Ashford RN  Body Position: turned  Taken 11/12/2022 2220 by Judy Ashford RN  Body Position: turned  Taken 11/12/2022 2015 by Judy Ashford RN  Body Position: turned  Intervention: Prevent and Manage VTE (Venous Thromboembolism) Risk  Recent Flowsheet Documentation  Taken 11/13/2022 0635 by Judy Ashford RN  Activity Management: up in chair  Taken 11/13/2022 0452 by Judy Ashford RN  Activity Management: up in chair  Taken 11/13/2022 0214 by Judy Ashford RN  Activity Management: up in chair  Taken 11/13/2022 0048 by Judy Ashford RN  Activity Management: up ad alessandro  Taken 11/12/2022 2220 by Judy Ashford RN  Activity Management: up in chair  Taken 11/12/2022 2015 by Judy Ashford RN  Activity Management:   activity adjusted per tolerance   activity encouraged  Range of Motion: active ROM (range of motion) encouraged  Intervention: Prevent Infection  Recent Flowsheet Documentation  Taken 11/13/2022 0635 by Judy Ashford RN  Infection Prevention:   hand hygiene promoted   personal protective equipment utilized  Taken 11/13/2022 0452 by Judy Ashford RN  Infection Prevention:   hand hygiene promoted   personal protective equipment utilized  Taken 11/13/2022 0214 by Judy Ashford RN  Infection Prevention:   hand hygiene promoted   personal protective equipment utilized  Taken 11/13/2022 0048 by Judy Ashford RN  Infection Prevention:   hand hygiene promoted   personal protective equipment utilized  Taken 11/12/2022 2220 by Judy Ahsford RN  Infection  Prevention: hand hygiene promoted  Taken 11/12/2022 2015 by Judy Ashford RN  Infection Prevention:   hand hygiene promoted   personal protective equipment utilized  Goal: Optimal Comfort and Wellbeing  Intervention: Provide Person-Centered Care  Recent Flowsheet Documentation  Taken 11/12/2022 2015 by Judy Ashford RN  Trust Relationship/Rapport: care explained     Problem: Pain Acute  Goal: Acceptable Pain Control and Functional Ability  Intervention: Prevent or Manage Pain  Recent Flowsheet Documentation  Taken 11/13/2022 0635 by Judy Ashford RN  Medication Review/Management: medications reviewed  Taken 11/13/2022 0452 by Judy Ashford RN  Medication Review/Management: medications reviewed  Taken 11/13/2022 0214 by Judy Ashford RN  Medication Review/Management: medications reviewed  Taken 11/13/2022 0048 by Judy Ashford RN  Medication Review/Management: medications reviewed  Taken 11/12/2022 2220 by Judy Ashford RN  Medication Review/Management: medications reviewed  Taken 11/12/2022 2015 by Judy Ashford RN  Medication Review/Management: medications reviewed  Intervention: Optimize Psychosocial Wellbeing  Recent Flowsheet Documentation  Taken 11/12/2022 2015 by Judy Ashford RN  Supportive Measures: active listening utilized  Diversional Activities:   television   smartphone     Problem: Breathing Pattern Ineffective  Goal: Effective Breathing Pattern  Intervention: Promote Improved Breathing Pattern  Recent Flowsheet Documentation  Taken 11/13/2022 0635 by Judy Ashford RN  Head of Bed (HOB) Positioning: HOB elevated  Taken 11/13/2022 0452 by Judy Ashford RN  Head of Bed (HOB) Positioning: HOB elevated  Taken 11/13/2022 0214 by Judy Ashford RN  Head of Bed (HOB) Positioning: HOB elevated  Taken 11/13/2022 0048 by Judy Ashford RN  Head of Bed (HOB) Positioning: HOB elevated  Taken 11/12/2022 2220 by Judy Ashford RN  Head of Bed (HOB) Positioning: HOB elevated  Taken 11/12/2022 2015 by Judy Ashford  RN  Supportive Measures: active listening utilized  Head of Bed (HOB) Positioning: HOB elevated     Problem: Fall Injury Risk  Goal: Absence of Fall and Fall-Related Injury  Intervention: Identify and Manage Contributors  Recent Flowsheet Documentation  Taken 11/13/2022 0635 by Judy Ashford RN  Medication Review/Management: medications reviewed  Taken 11/13/2022 0452 by Judy Ashford RN  Medication Review/Management: medications reviewed  Taken 11/13/2022 0214 by Judy Ashford RN  Medication Review/Management: medications reviewed  Taken 11/13/2022 0048 by Judy Ashford RN  Medication Review/Management: medications reviewed  Taken 11/12/2022 2220 by Judy Ashford RN  Medication Review/Management: medications reviewed  Taken 11/12/2022 2015 by Judy Ashford RN  Medication Review/Management: medications reviewed  Intervention: Promote Injury-Free Environment  Recent Flowsheet Documentation  Taken 11/13/2022 0635 by Judy Ashford RN  Safety Promotion/Fall Prevention:   assistive device/personal items within reach   clutter free environment maintained   fall prevention program maintained   lighting adjusted   nonskid shoes/slippers when out of bed   room organization consistent   safety round/check completed  Taken 11/13/2022 0452 by Judy Ashford RN  Safety Promotion/Fall Prevention:   assistive device/personal items within reach   clutter free environment maintained   fall prevention program maintained   lighting adjusted   nonskid shoes/slippers when out of bed   room organization consistent   safety round/check completed  Taken 11/13/2022 0214 by Judy Ashford RN  Safety Promotion/Fall Prevention:   assistive device/personal items within reach   clutter free environment maintained   fall prevention program maintained   lighting adjusted   nonskid shoes/slippers when out of bed   room organization consistent   safety round/check completed  Taken 11/13/2022 0048 by Judy Ashford RN  Safety Promotion/Fall  Prevention:   assistive device/personal items within reach   clutter free environment maintained   fall prevention program maintained   lighting adjusted   nonskid shoes/slippers when out of bed   room organization consistent   safety round/check completed  Taken 11/12/2022 2220 by Judy Ashford RN  Safety Promotion/Fall Prevention:   assistive device/personal items within reach   clutter free environment maintained   fall prevention program maintained   lighting adjusted   nonskid shoes/slippers when out of bed   room organization consistent   safety round/check completed  Taken 11/12/2022 2015 by Judy Ashford RN  Safety Promotion/Fall Prevention:   assistive device/personal items within reach   clutter free environment maintained   fall prevention program maintained   lighting adjusted   nonskid shoes/slippers when out of bed   room organization consistent   safety round/check completed     Problem: Skin Injury Risk Increased  Goal: Skin Health and Integrity  Intervention: Optimize Skin Protection  Recent Flowsheet Documentation  Taken 11/13/2022 0635 by Judy Ashford RN  Head of Bed (HOB) Positioning: HOB elevated  Taken 11/13/2022 0452 by Judy Ashford RN  Head of Bed (HOB) Positioning: HOB elevated  Taken 11/13/2022 0214 by Judy Ashford RN  Head of Bed (HOB) Positioning: HOB elevated  Taken 11/13/2022 0048 by Judy Ashford RN  Head of Bed (HOB) Positioning: HOB elevated  Taken 11/12/2022 2220 by Judy Ashford RN  Head of Bed (HOB) Positioning: HOB elevated  Taken 11/12/2022 2015 by Judy Ashford RN  Pressure Reduction Techniques: frequent weight shift encouraged  Head of Bed (HOB) Positioning: HOB elevated  Pressure Reduction Devices: pressure-redistributing mattress utilized   Goal Outcome Evaluation:

## 2022-11-14 ENCOUNTER — APPOINTMENT (OUTPATIENT)
Dept: GENERAL RADIOLOGY | Facility: HOSPITAL | Age: 47
End: 2022-11-14

## 2022-11-14 LAB
ALBUMIN SERPL-MCNC: 3.3 G/DL (ref 3.5–5.2)
ALBUMIN/GLOB SERPL: 1.2 G/DL
ALP SERPL-CCNC: 1032 U/L (ref 39–117)
ALT SERPL W P-5'-P-CCNC: 38 U/L (ref 1–33)
ANION GAP SERPL CALCULATED.3IONS-SCNC: 12 MMOL/L (ref 5–15)
ANISOCYTOSIS BLD QL: ABNORMAL
AST SERPL-CCNC: 37 U/L (ref 1–32)
BASOPHILS # BLD MANUAL: 22.28 10*3/MM3 (ref 0–0.2)
BASOPHILS NFR BLD MANUAL: 10 % (ref 0–1.5)
BILIRUB SERPL-MCNC: 0.5 MG/DL (ref 0–1.2)
BLASTS NFR BLD MANUAL: 9 % (ref 0–0)
BUN SERPL-MCNC: 31 MG/DL (ref 6–20)
BUN/CREAT SERPL: 30.1 (ref 7–25)
CALCIUM SPEC-SCNC: 8.1 MG/DL (ref 8.6–10.5)
CHLORIDE SERPL-SCNC: 106 MMOL/L (ref 98–107)
CO2 SERPL-SCNC: 20 MMOL/L (ref 22–29)
CREAT SERPL-MCNC: 1.03 MG/DL (ref 0.57–1)
DEPRECATED RDW RBC AUTO: 56.9 FL (ref 37–54)
EGFRCR SERPLBLD CKD-EPI 2021: 67.6 ML/MIN/1.73
EOSINOPHIL # BLD MANUAL: 17.82 10*3/MM3 (ref 0–0.4)
EOSINOPHIL NFR BLD MANUAL: 8 % (ref 0.3–6.2)
ERYTHROCYTE [DISTWIDTH] IN BLOOD BY AUTOMATED COUNT: 20.3 % (ref 12.3–15.4)
GLOBULIN UR ELPH-MCNC: 2.8 GM/DL
GLUCOSE BLDC GLUCOMTR-MCNC: 115 MG/DL (ref 70–105)
GLUCOSE BLDC GLUCOMTR-MCNC: 146 MG/DL (ref 70–105)
GLUCOSE BLDC GLUCOMTR-MCNC: 196 MG/DL (ref 70–105)
GLUCOSE SERPL-MCNC: 237 MG/DL (ref 65–99)
HCT VFR BLD AUTO: 26.9 % (ref 34–46.6)
HGB BLD-MCNC: 7.5 G/DL (ref 12–15.9)
LYMPHOCYTES # BLD MANUAL: 8.91 10*3/MM3 (ref 0.7–3.1)
MCH RBC QN AUTO: 22.7 PG (ref 26.6–33)
MCHC RBC AUTO-ENTMCNC: 27.8 G/DL (ref 31.5–35.7)
MCV RBC AUTO: 81.6 FL (ref 79–97)
METAMYELOCYTES NFR BLD MANUAL: 5 % (ref 0–0)
MYELOCYTES NFR BLD MANUAL: 9 % (ref 0–0)
NEUTROPHILS # BLD AUTO: 120.31 10*3/MM3 (ref 1.7–7)
NEUTROPHILS NFR BLD MANUAL: 35 % (ref 42.7–76)
NEUTS BAND NFR BLD MANUAL: 19 % (ref 0–5)
PHOSPHATE SERPL-MCNC: 3.9 MG/DL (ref 2.5–4.5)
PLAT MORPH BLD: NORMAL
PLATELET # BLD AUTO: 257 10*3/MM3 (ref 140–450)
PMV BLD AUTO: 8 FL (ref 6–12)
POIKILOCYTOSIS BLD QL SMEAR: ABNORMAL
POLYCHROMASIA BLD QL SMEAR: ABNORMAL
POTASSIUM SERPL-SCNC: 4.7 MMOL/L (ref 3.5–5.2)
PROMYELOCYTES NFR BLD MANUAL: 1 % (ref 0–0)
PROT SERPL-MCNC: 6.1 G/DL (ref 6–8.5)
RBC # BLD AUTO: 3.3 10*6/MM3 (ref 3.77–5.28)
SCAN SLIDE: NORMAL
SODIUM SERPL-SCNC: 138 MMOL/L (ref 136–145)
URATE SERPL-MCNC: 7.1 MG/DL (ref 2.4–5.7)
VARIANT LYMPHS NFR BLD MANUAL: 4 % (ref 19.6–45.3)
WBC MORPH BLD: NORMAL
WBC NRBC COR # BLD: 222.8 10*3/MM3 (ref 3.4–10.8)

## 2022-11-14 PROCEDURE — 82962 GLUCOSE BLOOD TEST: CPT

## 2022-11-14 PROCEDURE — 63710000001 INSULIN LISPRO (HUMAN) PER 5 UNITS: Performed by: NURSE PRACTITIONER

## 2022-11-14 PROCEDURE — 85007 BL SMEAR W/DIFF WBC COUNT: CPT | Performed by: NURSE PRACTITIONER

## 2022-11-14 PROCEDURE — 94799 UNLISTED PULMONARY SVC/PX: CPT

## 2022-11-14 PROCEDURE — 94760 N-INVAS EAR/PLS OXIMETRY 1: CPT

## 2022-11-14 PROCEDURE — 84550 ASSAY OF BLOOD/URIC ACID: CPT | Performed by: INTERNAL MEDICINE

## 2022-11-14 PROCEDURE — 25010000002 MORPHINE PER 10 MG: Performed by: INTERNAL MEDICINE

## 2022-11-14 PROCEDURE — 63710000001 INSULIN GLARGINE PER 5 UNITS: Performed by: NURSE PRACTITIONER

## 2022-11-14 PROCEDURE — 73552 X-RAY EXAM OF FEMUR 2/>: CPT

## 2022-11-14 PROCEDURE — 80053 COMPREHEN METABOLIC PANEL: CPT | Performed by: INTERNAL MEDICINE

## 2022-11-14 PROCEDURE — 73590 X-RAY EXAM OF LOWER LEG: CPT

## 2022-11-14 PROCEDURE — 84100 ASSAY OF PHOSPHORUS: CPT | Performed by: INTERNAL MEDICINE

## 2022-11-14 PROCEDURE — 99232 SBSQ HOSP IP/OBS MODERATE 35: CPT | Performed by: INTERNAL MEDICINE

## 2022-11-14 PROCEDURE — 85025 COMPLETE CBC W/AUTO DIFF WBC: CPT | Performed by: NURSE PRACTITIONER

## 2022-11-14 PROCEDURE — 94664 DEMO&/EVAL PT USE INHALER: CPT

## 2022-11-14 RX ORDER — ENOXAPARIN SODIUM 100 MG/ML
80 INJECTION SUBCUTANEOUS EVERY 12 HOURS
Status: DISCONTINUED | OUTPATIENT
Start: 2022-11-14 | End: 2022-11-15

## 2022-11-14 RX ORDER — MORPHINE SULFATE 2 MG/ML
2 INJECTION, SOLUTION INTRAMUSCULAR; INTRAVENOUS EVERY 4 HOURS PRN
Status: DISCONTINUED | OUTPATIENT
Start: 2022-11-14 | End: 2022-11-16

## 2022-11-14 RX ADMIN — FOLIC ACID 1 MG: 1 TABLET ORAL at 08:21

## 2022-11-14 RX ADMIN — ALLOPURINOL 100 MG: 100 TABLET ORAL at 05:57

## 2022-11-14 RX ADMIN — Medication 10 ML: at 20:21

## 2022-11-14 RX ADMIN — HYDROCODONE BITARTRATE AND ACETAMINOPHEN 1 TABLET: 7.5; 325 TABLET ORAL at 18:59

## 2022-11-14 RX ADMIN — INSULIN LISPRO 10 UNITS: 100 INJECTION, SOLUTION INTRAVENOUS; SUBCUTANEOUS at 18:15

## 2022-11-14 RX ADMIN — HYDROCODONE BITARTRATE AND ACETAMINOPHEN 1 TABLET: 7.5; 325 TABLET ORAL at 05:57

## 2022-11-14 RX ADMIN — BUDESONIDE AND FORMOTEROL FUMARATE DIHYDRATE 2 PUFF: 160; 4.5 AEROSOL RESPIRATORY (INHALATION) at 19:19

## 2022-11-14 RX ADMIN — CITALOPRAM HYDROBROMIDE 10 MG: 20 TABLET ORAL at 08:21

## 2022-11-14 RX ADMIN — ALPRAZOLAM 0.5 MG: 0.5 TABLET ORAL at 20:21

## 2022-11-14 RX ADMIN — MORPHINE SULFATE 2 MG: 2 INJECTION, SOLUTION INTRAMUSCULAR; INTRAVENOUS at 16:04

## 2022-11-14 RX ADMIN — SODIUM CHLORIDE 75 ML/HR: 9 INJECTION, SOLUTION INTRAVENOUS at 01:20

## 2022-11-14 RX ADMIN — INSULIN LISPRO 3 UNITS: 100 INJECTION, SOLUTION INTRAVENOUS; SUBCUTANEOUS at 08:22

## 2022-11-14 RX ADMIN — HYDROCODONE BITARTRATE AND ACETAMINOPHEN 1 TABLET: 7.5; 325 TABLET ORAL at 13:00

## 2022-11-14 RX ADMIN — IMATINIB MESYLATE 400 MG: 400 TABLET, FILM COATED ORAL at 09:01

## 2022-11-14 RX ADMIN — CARVEDILOL 6.25 MG: 6.25 TABLET, FILM COATED ORAL at 08:21

## 2022-11-14 RX ADMIN — IMATINIB MESYLATE 200 MG: 100 TABLET, FILM COATED ORAL at 08:59

## 2022-11-14 RX ADMIN — ASPIRIN 325 MG: 325 TABLET, COATED ORAL at 08:21

## 2022-11-14 RX ADMIN — ALLOPURINOL 100 MG: 100 TABLET ORAL at 13:01

## 2022-11-14 RX ADMIN — INSULIN GLARGINE 30 UNITS: 100 INJECTION, SOLUTION SUBCUTANEOUS at 08:23

## 2022-11-14 RX ADMIN — ALLOPURINOL 100 MG: 100 TABLET ORAL at 22:14

## 2022-11-14 RX ADMIN — CARVEDILOL 6.25 MG: 6.25 TABLET, FILM COATED ORAL at 18:15

## 2022-11-14 RX ADMIN — GUAIFENESIN 600 MG: 600 TABLET, EXTENDED RELEASE ORAL at 08:21

## 2022-11-14 RX ADMIN — MORPHINE SULFATE 2 MG: 2 INJECTION, SOLUTION INTRAMUSCULAR; INTRAVENOUS at 20:21

## 2022-11-14 RX ADMIN — INSULIN LISPRO 10 UNITS: 100 INJECTION, SOLUTION INTRAVENOUS; SUBCUTANEOUS at 08:21

## 2022-11-14 RX ADMIN — MIRTAZAPINE 30 MG: 15 TABLET, FILM COATED ORAL at 20:21

## 2022-11-14 RX ADMIN — BUDESONIDE AND FORMOTEROL FUMARATE DIHYDRATE 2 PUFF: 160; 4.5 AEROSOL RESPIRATORY (INHALATION) at 08:25

## 2022-11-14 RX ADMIN — Medication 10 ML: at 08:23

## 2022-11-14 RX ADMIN — GUAIFENESIN 600 MG: 600 TABLET, EXTENDED RELEASE ORAL at 20:21

## 2022-11-14 NOTE — PROGRESS NOTES
"Heart Failure Program  Nurse Navigator  Discharge Planning: Follow-up Note    Patient Name:Nieves Mc  :1975  Current Admission Date: 11/3/2022       Last 3 Weights:  Wt Readings from Last 3 Encounters:   22 79 kg (174 lb 2.6 oz)   10/31/22 68.3 kg (150 lb 9.2 oz)   10/19/22 56.2 kg (123 lb 14.4 oz)       Intake and Output totals: I/O last 3 completed shifts:  In: 2319 [P.O.:1320; I.V.:999]  Out: 1500 [Urine:1500]  No intake/output data recorded.          Patient Assessment:   pt lying in bed, resp even and unlabored, no soa with conversation. Pt advises she is having left leg pain. Noted 2+ edema bilateral lower legs to below knee. Pt is upset she has regained swelling over the weekend. Pt is on IVF per nephrology.  Pt stats, \"I don't want to leave with all of this fluid is on me, it is like no one is talking\".      Patient Education:   review HF s/s    Review HF Education provided to patient:  yes-Symptoms worsening  yes-Prescribed medications  yes-HF self-care  [id number] Appointments       Acceptance of learning: acceptance, cooperative    Heart Failure education interactive teaching session time: 15 minutes      Identified needs/barriers:   Volume status, I&O, daily wieghts    Intervention follow-up:  Discussion with RN,     "

## 2022-11-14 NOTE — PROGRESS NOTES
"NEPHROLOGY PROGRESS NOTE        Patient Care Team:  Alida Latham APRN as PCP - General (Nurse Practitioner)  Meghann Lerma MD as Consulting Physician (Hematology and Oncology)      Provider:  Giselle Andre MD  Patient Name: Nieves Mc  :  1975    SUBJECTIVE:  F/u ANAYA/Hyperkalemia  No chest pain or SOA  Creatinine improved to 1.1 today.  On IV fluid normal saline 100/h    Medication:  albuterol, 10 mg, Nebulization, Once  allopurinol, 100 mg, Oral, Q8H  aspirin, 325 mg, Oral, Daily  budesonide-formoterol, 2 puff, Inhalation, BID - RT  carvedilol, 6.25 mg, Oral, BID With Meals  citalopram, 10 mg, Oral, Daily  enoxaparin, 40 mg, Subcutaneous, Q24H  fentaNYL, 1 patch, Transdermal, Q72H  First Mouthwash (Magic Mouthwash), 5 mL, Swish & Spit, Q4H  folic acid, 1 mg, Oral, Daily  guaiFENesin, 600 mg, Oral, Q12H  imatinib, 400 mg, Oral, Daily   And  imatinib, 200 mg, Oral, Daily  insulin glargine, 30 Units, Subcutaneous, Daily  insulin lispro, 10 Units, Subcutaneous, TID AC  insulin lispro, 2-9 Units, Subcutaneous, TID With Meals  mirtazapine, 30 mg, Oral, Nightly  sodium chloride, 250 mL, Intravenous, Once  sodium chloride, 10 mL, Intravenous, Q12H      sodium chloride, 75 mL/hr, Last Rate: 75 mL/hr (22 0120)        OBJECTIVE    Vital Sign Min/Max for last 24 hours  Temp  Min: 97.3 °F (36.3 °C)  Max: 98 °F (36.7 °C)   BP  Min: 102/58  Max: 121/65   Pulse  Min: 89  Max: 107   Resp  Min: 16  Max: 18   SpO2  Min: 94 %  Max: 97 %   No data recorded   Weight  Min: 79 kg (174 lb 2.6 oz)  Max: 79 kg (174 lb 2.6 oz)     Flowsheet Rows    Flowsheet Row First Filed Value   Admission Height 162.6 cm (64\") Documented at 2022 0930   Admission Weight 68 kg (150 lb) Documented at 2022 0930          I/O this shift:  In: 240 [P.O.:240]  Out: -   I/O last 3 completed shifts:  In: 2799 [P.O.:1800; I.V.:999]  Out: 2300 [Urine:2300]    Physical Exam:  General Appearance: alert, appears stated age " and cooperative  Head: normocephalic, without obvious abnormality and atraumatic  Eyes: conjunctivae and sclerae normal and no icterus  Neck: supple and no JVD  Lungs: clear to auscultation and respirations regular  Heart: regular rhythm & normal rate and normal S1, S2  Chest: Wall no abnormalities observed  Abdomen: normal bowel sounds and soft, nontender  Extremities: moves extremities well, trace edema, no cyanosis and no redness  Skin: no bleeding, bruising or rash, turgor normal, color normal and no lesions noted  Neurologic: Alert, and oriented. No focal deficits    Labs:    WBC WBC   Date Value Ref Range Status   11/13/2022 255.40 (C) 3.40 - 10.80 10*3/mm3 Final   11/11/2022 270.60 (C) 3.40 - 10.80 10*3/mm3 Final   11/11/2022 298.80 (C) 3.40 - 10.80 10*3/mm3 Final      HGB Hemoglobin   Date Value Ref Range Status   11/13/2022 7.8 (L) 12.0 - 15.9 g/dL Final   11/11/2022 8.3 (L) 12.0 - 15.9 g/dL Final   11/11/2022 8.5 (L) 12.0 - 15.9 g/dL Final      HCT Hematocrit   Date Value Ref Range Status   11/13/2022 27.1 (L) 34.0 - 46.6 % Final   11/11/2022 28.1 (L) 34.0 - 46.6 % Final   11/11/2022 30.0 (L) 34.0 - 46.6 % Final      Platelets No results found for: LABPLAT   MCV MCV   Date Value Ref Range Status   11/13/2022 80.2 79.0 - 97.0 fL Final   11/11/2022 80.2 79.0 - 97.0 fL Final   11/11/2022 78.9 (L) 79.0 - 97.0 fL Final          Sodium Sodium   Date Value Ref Range Status   11/13/2022 135 (L) 136 - 145 mmol/L Final   11/11/2022 134 (L) 136 - 145 mmol/L Final   11/11/2022 133 (L) 136 - 145 mmol/L Final   11/11/2022 133 (L) 136 - 145 mmol/L Final      Potassium Potassium   Date Value Ref Range Status   11/13/2022 5.0 3.5 - 5.2 mmol/L Final   11/11/2022 5.1 3.5 - 5.2 mmol/L Final   11/11/2022 6.1 (C) 3.5 - 5.2 mmol/L Final     Comment:     Result checked    Slight hemolysis detected by analyzer. Results may be affected.   11/11/2022 5.9 (H) 3.5 - 5.2 mmol/L Final     Comment:     Slight hemolysis detected by  analyzer. Results may be affected.      Chloride Chloride   Date Value Ref Range Status   11/13/2022 101 98 - 107 mmol/L Final   11/11/2022 97 (L) 98 - 107 mmol/L Final   11/11/2022 99 98 - 107 mmol/L Final   11/11/2022 97 (L) 98 - 107 mmol/L Final      CO2 CO2   Date Value Ref Range Status   11/13/2022 23.0 22.0 - 29.0 mmol/L Final   11/11/2022 25.0 22.0 - 29.0 mmol/L Final   11/11/2022 23.0 22.0 - 29.0 mmol/L Final   11/11/2022 25.0 22.0 - 29.0 mmol/L Final      BUN BUN   Date Value Ref Range Status   11/13/2022 42 (H) 6 - 20 mg/dL Final   11/11/2022 55 (H) 6 - 20 mg/dL Final   11/11/2022 53 (H) 6 - 20 mg/dL Final   11/11/2022 51 (H) 6 - 20 mg/dL Final      Creatinine Creatinine   Date Value Ref Range Status   11/13/2022 1.14 (H) 0.57 - 1.00 mg/dL Final   11/11/2022 1.88 (H) 0.57 - 1.00 mg/dL Final   11/11/2022 1.58 (H) 0.57 - 1.00 mg/dL Final   11/11/2022 1.58 (H) 0.57 - 1.00 mg/dL Final      Calcium Calcium   Date Value Ref Range Status   11/13/2022 8.2 (L) 8.6 - 10.5 mg/dL Final   11/11/2022 8.3 (L) 8.6 - 10.5 mg/dL Final   11/11/2022 8.4 (L) 8.6 - 10.5 mg/dL Final   11/11/2022 8.7 8.6 - 10.5 mg/dL Final      PO4 No components found for: PO4   Albumin Albumin   Date Value Ref Range Status   11/13/2022 3.10 (L) 3.50 - 5.20 g/dL Final   11/11/2022 3.30 (L) 3.50 - 5.20 g/dL Final   11/11/2022 3.30 (L) 3.50 - 5.20 g/dL Final      Magnesium No results found for: MG   Uric Acid No components found for: URIC ACID     Imaging Results (Last 72 Hours)     Procedure Component Value Units Date/Time    CT Chest Without Contrast Diagnostic [[id number]] Collected: 11/13/22 1410     Updated: 11/13/22 1419    Narrative:         DATE OF EXAM:  11/13/2022 1:42 PM     PROCEDURE:  CT CHEST WO CONTRAST DIAGNOSTIC-     INDICATIONS:   Chest wall pain, nontraumatic, infection or inflammation suspected, xray  done; R07.9-Chest pain, unspecified; J81.0-Acute pulmonary edema;  R06.00-Dyspnea, unspecified; D72.829-Elevated white blood cell  count,  unspecified; C92.10-Chronic myeloid leukemia, BCR/ABL-positive, not  having achieved remission     COMPARISON:   CT chest with contrast 10/31/2022     TECHNIQUE:  Routine transaxial slices were obtained through the chest without the  administration of intravenous contrast. Reconstructed coronal and  sagittal images were also obtained. Automated exposure control and  iterative construction methods were used.      FINDINGS:  Soft tissues of the lower neck are without acute abnormality. The heart  is enlarged. Negative for mediastinal, hilar, or axillary adenopathy.  There is extensive anasarca similar to prior study. Negative for pleural  effusion.     The trachea and mainstem bronchi are patent. No pneumothorax. Mild  septal thickening consistent with mild pulmonary edema. No consolidation  or findings of pneumonia. No suspicious solid pulmonary nodule. Mild  scattered groundglass opacities related to pulmonary edema.     Upper abdomen demonstrates hepatosplenomegaly. Gallbladder absent. No  free fluid in the upper abdomen. Skin thickening of both breasts similar  to the prior study.       Impression:      1. Cardiomegaly and mild pulmonary edema.  2. Diffuse anasarca.  3. Hepatosplenomegaly.     Electronically Signed By-Zac Turcios MD On:11/13/2022 2:17 PM  This report was finalized on [id number] by  Zac Turcios MD.          Results for orders placed during the hospital encounter of 11/03/22    XR Chest 1 View    Narrative  DATE OF EXAM:  11/3/2022 10:16 AM    PROCEDURE:  XR CHEST 1 VW-    INDICATIONS:  Chest Pain Protocol    COMPARISON:  10/31/2022 and prior    TECHNIQUE:  Portable Chest    FINDINGS:    Study is limited by overlying support apparatus. Lung volumes are low.  There is cardiomegaly which is significant change when accounting for  differences in inspiratory volume. Mild pulmonary vascular congestion is  noted. Increased hazy and interstitial opacities are noted diffusely  which are  accentuated by low lung volumes. No obvious pleural effusion  noted. Osseous structures are unremarkable    Impression  IMPRESSION :  Cardiomegaly and mild pulmonary vascular congestion which is increase in  the comparison. This may be in part artifactual from low lung volumes  versus underlying pulmonary edema or pneumonia in the correct clinical  setting[    Electronically Signed By-Freddy Toribio On:11/3/2022 10:26 AM  This report was finalized on [id number] by  Freddy Toribio, .      Results for orders placed during the hospital encounter of 10/31/22    XR Chest 1 View    Narrative  DATE OF EXAM:  10/31/2022 2:11 PM    PROCEDURE:  XR CHEST 1 VW-    INDICATIONS:  shortness of breath    COMPARISON:  09/22/2022, 07/27/2022    TECHNIQUE:  [Portable chest radiograph]    FINDINGS:  Mild hazy groundglass infiltrates are seen throughout the lungs  bilaterally with mild interstitial changes. The findings have improved.  Cardiomegaly is observed. The mediastinum is stable. No acute osseous  abnormalities are seen.    Impression  1.     Improved aeration throughout the lungs bilaterally. There are  mild residual hazy groundglass densities throughout the lungs with mild  interstitial changes. These finds suggest mild underlying chronic  pulmonary edema. Stable cardiomegaly is noted.    Electronically Signed By-Amador Marquez MD On:10/31/2022 2:34 PM  This report was finalized on [id number] by  Amador Marquez MD.      Results for orders placed during the hospital encounter of 09/22/22    XR Chest PA & Lateral    Narrative  XR CHEST PA AND LATERAL-    Date of Exam: 9/22/2022 11:53 AM    Indication: SOA and Cough; C92.10-Chronic myeloid leukemia,  BCR/ABL-positive, not having achieved remission.    Comparison: CTA chest 07/27/2022, AP chest x-ray 07/27/2022.    Technique: Two views of the chest were obtained.    FINDINGS: There are bilateral perihilar and bibasilar airspace  opacities, which have decreased compared  to 07/27/2022 chest x-ray. No  definite pleural effusion is seen. Cardiac silhouette remains enlarged.    Impression  1.     Decreased, but not resolved bilateral perihilar and bibasilar  airspace opacities, which may be due to pulmonary edema and/or  multifocal infection.  2.     Stable cardiomegaly.      Electronically Signed By-Sandra Ramon MD On:9/22/2022 12:10 PM  This report was finalized on [id number] by  Sandra Ramon MD.      Results for orders placed during the hospital encounter of 03/11/21    Duplex Venous Lower Extremity - Left    Interpretation Summary  · Normal left lower extremity venous duplex scan.        ASSESSMENT / PLAN      Chest pain, unspecified type    CML (chronic myelocytic leukemia) (HCC)    Depression with anxiety    History of pulmonary embolism    Medically noncompliant    Type 2 diabetes mellitus with diabetic polyneuropathy, with long-term current use of insulin (HCC)    Acute diastolic CHF (congestive heart failure) (HCC)    NICM (nonischemic cardiomyopathy) (HCC)    Bilateral lower extremity edema    • ANAYA--likely pre-renal and or ATN in setting of hypotension/ARBs/diuretics. hold entresto, diuretics. UA neg. Uric acid 9.5  • Hyperkalemia--due to ANAYA/entresto. Hold for now  • DM2  • CML--on chemo  • Chronic diastolic heart failure--compensated. Given rising CR will hold diuretics for now  • TLS--high tumor burden. Hydrate    Creatinine better, uric acid down to 8.5  Continue IV fluids, decrease to 75 mill per hour  Continue allopurinol for tumor lysis syndrome.  Monitor renal function fluid status of juanis Andre MD    11/14/22  06:14 EST

## 2022-11-14 NOTE — PLAN OF CARE
Goal Outcome Evaluation:  Plan of Care Reviewed With: patient           Outcome Evaluation: Pt has slept throughout the night, however c/o generalize body pain earlier in the shift gave PRN pain medication. Pt currently has IVF infusing at this time for kidney functions. Will continue to monitor.

## 2022-11-14 NOTE — PROGRESS NOTES
[facility] Medicine Services Daily Progress Note    Patient Name: Nieves Mc  : 1975  MRN: [id number]  Primary Care Physician:  Alida Latham APRN  Date of admission: 11/3/2022      Subjective          Brief interim history: 47-year-old female with nonischemic cardiomyopathy, HFpEF, PE, anxiety, T2DM, history of medical noncompliance, depression/anxiety and CML. She was admitted on 11/3/2022, presented to [facility] ED, complaining of chest pain that started at around 9 AM while she was at home with her .  Chest pain is described as dull in nature, radiating to the left with paresthesia.  Cardiac biomarkers with troponin negative x3 and EKG shows sinus tachycardia.  Laboratories notable for [place]BNP of 74727 and chest x-ray showed cardiomegaly. Patient is admitted with atypical chest pain chest pain, rule out for ACS.  Seen in consultation by cardiologist with recommendation and followed by oncologist for CML.     2022: Seen and examined in follow evaluation.  Chest pain-free and resting comfortably in bed.     2022: Seen and examined in follow-up.  Feels and looks better this morning.  No complaints or event.    2022: Seen and examined in follow-up.  Feels slightly better but still dyspneic with activity.  Underwent NST today with result pending      2022  Patient seen and examined  Reported feeling generally weak otherwise no new complaints  Initial plan was to discharge patient today however cardiologist wants to keep patient as she still has bilateral pedal edema  Furosemide frequency was increased and metolazone added    2022  Patient seen and examined  Continues with generalized body weakness  Pedal edema with no significant change despite increased diuretic frequency.  We will continue to monitor    2022  Patient seen and examined  Has no new complaint  Furosemide was increased to 40 mg twice daily, metolazone and Entresto added by  cardiology.  Patient with acute renal injury-most likely due to diuretics  Also noted to have hyperkalemia-due to acute kidney injury and Entresto use  Was given Lokelma  With decreased diuretic to daily and hold metolazone    11/10/2022  Patient seen and examined  No new complaint  Bilateral lower extremity swelling improving   Still on creatinine now stabilized  Change furosemide to 40 mg daily  BMP in the a.m.    11/11/2022  Patient seen and examined  Bilateral lower extremity swelling has improved  Serum creatinine worsening  Furosemide and Entresto discontinued  Nephrologist consulted  [facility] in the a.m.    11/12/2022  Patient seen and examined  No new complaint    11/13/2022  Patient seen and examined  Complaining of pain on the right breast    11/14  Seen and examined vitals and labs reviewed  Discussed with nursing staff  Severe pain left thigh and leg.  No signs of vascular compromise or swelling or infection noted  X-rays ordered        Review of systems  All other system reviewed and negative except as above      Objective      Vitals:   Temp:  [97.3 °F (36.3 °C)-98.4 °F (36.9 °C)] 98.4 °F (36.9 °C)  Heart Rate:  [] 113  Resp:  [16-18] 17  BP: (102-132)/(58-76) 132/76    Physical Exam  Vitals reviewed.   Constitutional:       Comments: Chronically ill looking   HENT:      Head: Normocephalic.      Nose: Nose normal.      Mouth/Throat:      Mouth: Mucous membranes are moist.   Eyes:      Extraocular Movements: Extraocular movements intact.      Conjunctiva/sclera: Conjunctivae normal.      Pupils: Pupils are equal, round, and reactive to light.   Cardiovascular:      Rate and Rhythm: Normal rate and regular rhythm.   Pulmonary:      Effort: No respiratory distress.   Abdominal:      General: Bowel sounds are normal.      Palpations: Abdomen is soft.      Tenderness: There is no abdominal tenderness.   Musculoskeletal:         General: Normal range of motion.      Cervical back: Neck supple.   Skin:      General: Skin is warm.   Neurological:      Mental Status: She is alert and oriented to person, place, and time.   Psychiatric:         Mood and Affect: Mood normal.          Result Review    Result Review:  I have personally reviewed the results from the time of this admission to 11/14/2022 10:20 EST and agree with these findings:  [x]  Laboratory  []  Microbiology  [x]  Radiology  [x]  EKG/Telemetry   [x]  Cardiology/Vascular   []  Pathology  []  Old records  []  Other:  Most notable findings include:       Assessment & Plan        albuterol, 10 mg, Nebulization, Once  allopurinol, 100 mg, Oral, Q8H  aspirin, 325 mg, Oral, Daily  budesonide-formoterol, 2 puff, Inhalation, BID - RT  carvedilol, 6.25 mg, Oral, BID With Meals  citalopram, 10 mg, Oral, Daily  enoxaparin, 40 mg, Subcutaneous, Q24H  fentaNYL, 1 patch, Transdermal, Q72H  First Mouthwash (Magic Mouthwash), 5 mL, Swish & Spit, Q4H  folic acid, 1 mg, Oral, Daily  guaiFENesin, 600 mg, Oral, Q12H  imatinib, 400 mg, Oral, Daily   And  imatinib, 200 mg, Oral, Daily  insulin glargine, 30 Units, Subcutaneous, Daily  insulin lispro, 10 Units, Subcutaneous, TID AC  insulin lispro, 2-9 Units, Subcutaneous, TID With Meals  mirtazapine, 30 mg, Oral, Nightly  sodium chloride, 250 mL, Intravenous, Once  sodium chloride, 10 mL, Intravenous, Q12H       sodium chloride, 75 mL/hr, Last Rate: 75 mL/hr (11/14/22 0120)         Active Hospital Problems:  Active Hospital Problems    Diagnosis    • **Chest pain, unspecified type    • Bilateral lower extremity edema    • Acute diastolic CHF (congestive heart failure) ([facility])    • NICM (nonischemic cardiomyopathy) ([facility])    • Type 2 diabetes mellitus with diabetic polyneuropathy, with long-term current use of insulin ([facility])    • Depression with anxiety    • Medically noncompliant    • History of pulmonary embolism    • CML (chronic myelocytic leukemia) ([facility])      Assessment/plan:      Chest pain (atypical)  MI ruled out with cardiac  enzymes and EKG  Had stress test 11/6/2022-negative for ischemia  Was seen by cardiologist who recommended no further work-up  Supportive care     Acute on chronic systolic/ diastolic heart failure  Nonischemic cardiomyopathy  EF 45%  On low-dose beta-blocker, losartan and diuretic  Still with pedal edema and shortness of breath with exertion  Defer diuretics and fluids to nephrologist/cardiologist  Received Lokelma for hyperkalemia      Acute kidney injury  Possible tumor lysis syndrome  Serum creatinine continues to worsening despite discontinuing diuretic and Entresto  Nephrologist following  Uric acid 9.5  ?  Volume depletion and underlying tumor lysis syndrome  Started on IV hydration  Also started on allopurinol      Hyperkalemia-  Most likely due to acute kidney injury/Entresto use  Improved with  hyperkalemia protocol    11/14Severe pain left thigh and leg.  No signs of vascular compromise or swelling or infection noted  X-rays ordered       History of pulmonary embolus  Currently on prophylactic Lovenox  Hematologist following  On Xarelto at home    Diabetes mellitus type 2  Continue on insulin regimen  Monitor blood sugar and adjust insulin as needed        CML not in remission  Elevated alkaline phosphatase  Hyperleukocytosis  On Gleevec/hydroxyurea  Oncologist following  Poor compliance with medications noted    Anxiety/depression  On Remeron/Celexa  Was seen by psychiatrist while inpatient     Generalized weakness  Due to underlying comorbidities  Was seen by physical therapy/Occupational Therapy-no skilled therapy needed           DVT prophylaxis:  Medical DVT prophylaxis orders are present.    CODE STATUS:    Level Of Support Discussed With: Patient  Code Status (Patient has no pulse and is not breathing): CPR (Attempt to Resuscitate)  Medical Interventions (Patient has pulse or is breathing): Full Support      Disposition:  I expect patient to be discharged     Electronically signed by George Singh  MD, 11/14/22, 10:20 EST.  [facility] Hospitalist Team

## 2022-11-14 NOTE — PLAN OF CARE
Problem: Adult Inpatient Plan of Care  Goal: Absence of Hospital-Acquired Illness or Injury  Intervention: Identify and Manage Fall Risk  Recent Flowsheet Documentation  Taken 11/14/2022 1854 by Judy Ashford, CAMILA  Safety Promotion/Fall Prevention:   assistive device/personal items within reach   clutter free environment maintained   fall prevention program maintained   lighting adjusted   nonskid shoes/slippers when out of bed   room organization consistent   safety round/check completed  Taken 11/14/2022 1632 by Judy Ashford, CAMILA  Safety Promotion/Fall Prevention:   assistive device/personal items within reach   clutter free environment maintained   fall prevention program maintained   lighting adjusted   nonskid shoes/slippers when out of bed   room organization consistent   safety round/check completed  Taken 11/14/2022 1435 by Judy Ashford, CAMILA  Safety Promotion/Fall Prevention:   assistive device/personal items within reach   clutter free environment maintained   fall prevention program maintained   lighting adjusted   nonskid shoes/slippers when out of bed   room organization consistent   safety round/check completed  Taken 11/14/2022 1220 by Judy Ashford, CAMILA  Safety Promotion/Fall Prevention:   assistive device/personal items within reach   clutter free environment maintained   fall prevention program maintained   lighting adjusted   nonskid shoes/slippers when out of bed   room organization consistent   safety round/check completed  Taken 11/14/2022 1025 by Judy Ashford, CAMILA  Safety Promotion/Fall Prevention:   assistive device/personal items within reach   clutter free environment maintained   fall prevention program maintained   lighting adjusted   nonskid shoes/slippers when out of bed   room organization consistent   safety round/check completed  Taken 11/14/2022 0819 by Judy Ashford, CAMILA  Safety Promotion/Fall Prevention:   assistive device/personal items within reach   clutter free environment  maintained   fall prevention program maintained   lighting adjusted   nonskid shoes/slippers when out of bed   room organization consistent   safety round/check completed  Intervention: Prevent Skin Injury  Recent Flowsheet Documentation  Taken 11/14/2022 1854 by Judy Ashford RN  Body Position: position changed independently  Taken 11/14/2022 1632 by Judy Ashford RN  Body Position: position changed independently  Taken 11/14/2022 1435 by Judy Ashford RN  Body Position: position changed independently  Taken 11/14/2022 1220 by Judy Ashford RN  Body Position: position changed independently  Taken 11/14/2022 1025 by Judy Ashford RN  Body Position: position changed independently  Taken 11/14/2022 0819 by Judy Ashford RN  Body Position: position changed independently  Intervention: Prevent and Manage VTE (Venous Thromboembolism) Risk  Recent Flowsheet Documentation  Taken 11/14/2022 1854 by Judy Ashford RN  Activity Management:   activity adjusted per tolerance   activity encouraged  Taken 11/14/2022 1632 by Judy Ashford RN  Activity Management:   activity adjusted per tolerance   activity encouraged  Taken 11/14/2022 1435 by Judy Ashford RN  Activity Management:   activity adjusted per tolerance   activity encouraged  Taken 11/14/2022 1248 by Judy Ashford RN  Activity Management:   activity adjusted per tolerance   activity encouraged  Taken 11/14/2022 1220 by Judy Ashford RN  Activity Management:   activity adjusted per tolerance   activity encouraged  Taken 11/14/2022 1025 by Judy Asfhord RN  Activity Management:   activity adjusted per tolerance   activity encouraged  Taken 11/14/2022 0819 by Judy Ashford RN  Activity Management:   activity adjusted per tolerance   activity encouraged  Range of Motion: active ROM (range of motion) encouraged  Intervention: Prevent Infection  Recent Flowsheet Documentation  Taken 11/14/2022 1854 by Judy Ashford RN  Infection Prevention:   hand hygiene promoted   personal  protective equipment utilized  Taken 11/14/2022 1632 by Judy Ashford RN  Infection Prevention:   hand hygiene promoted   personal protective equipment utilized  Taken 11/14/2022 1435 by Judy Ashford RN  Infection Prevention:   hand hygiene promoted   personal protective equipment utilized  Taken 11/14/2022 1220 by Judy Ashford RN  Infection Prevention:   cohorting utilized   hand hygiene promoted   personal protective equipment utilized  Taken 11/14/2022 1025 by Judy Ashford RN  Infection Prevention:   hand hygiene promoted   personal protective equipment utilized  Taken 11/14/2022 0819 by Judy Ashford RN  Infection Prevention:   hand hygiene promoted   personal protective equipment utilized  Goal: Optimal Comfort and Wellbeing  Intervention: Provide Person-Centered Care  Recent Flowsheet Documentation  Taken 11/14/2022 0819 by Judy Ashford RN  Trust Relationship/Rapport: care explained     Problem: Pain Acute  Goal: Acceptable Pain Control and Functional Ability  Intervention: Prevent or Manage Pain  Recent Flowsheet Documentation  Taken 11/14/2022 1854 by Judy Ashford RN  Medication Review/Management: medications reviewed  Taken 11/14/2022 1632 by Judy Ashford RN  Medication Review/Management: medications reviewed  Taken 11/14/2022 1435 by Judy Ashford RN  Medication Review/Management: medications reviewed  Taken 11/14/2022 1220 by Judy Ashford RN  Medication Review/Management: medications reviewed  Taken 11/14/2022 0819 by Judy Ashford RN  Medication Review/Management: medications reviewed  Intervention: Optimize Psychosocial Wellbeing  Recent Flowsheet Documentation  Taken 11/14/2022 0819 by Judy Ashford RN  Supportive Measures: active listening utilized  Diversional Activities:   television   smartphone     Problem: Breathing Pattern Ineffective  Goal: Effective Breathing Pattern  Intervention: Promote Improved Breathing Pattern  Recent Flowsheet Documentation  Taken 11/14/2022 1632 by Judy Ashford  RN  Head of Bed (HOB) Positioning: HOB elevated  Taken 11/14/2022 1435 by Judy Ashford RN  Head of Bed (HOB) Positioning: HOB elevated  Taken 11/14/2022 1220 by Judy Ashford RN  Head of Bed (HOB) Positioning: HOB elevated  Taken 11/14/2022 1025 by Judy Ashford RN  Head of Bed (HOB) Positioning: HOB elevated  Taken 11/14/2022 0819 by Judy Ashford RN  Supportive Measures: active listening utilized  Head of Bed (HOB) Positioning: HOB elevated     Problem: Fall Injury Risk  Goal: Absence of Fall and Fall-Related Injury  Intervention: Identify and Manage Contributors  Recent Flowsheet Documentation  Taken 11/14/2022 1854 by Judy Ashford RN  Medication Review/Management: medications reviewed  Taken 11/14/2022 1632 by Judy Ashford RN  Medication Review/Management: medications reviewed  Taken 11/14/2022 1435 by Judy Ashford RN  Medication Review/Management: medications reviewed  Taken 11/14/2022 1220 by Judy Ashford RN  Medication Review/Management: medications reviewed  Taken 11/14/2022 0819 by Judy Ashford RN  Medication Review/Management: medications reviewed  Intervention: Promote Injury-Free Environment  Recent Flowsheet Documentation  Taken 11/14/2022 1854 by Judy Ashford RN  Safety Promotion/Fall Prevention:   assistive device/personal items within reach   clutter free environment maintained   fall prevention program maintained   lighting adjusted   nonskid shoes/slippers when out of bed   room organization consistent   safety round/check completed  Taken 11/14/2022 1632 by Judy Ashford RN  Safety Promotion/Fall Prevention:   assistive device/personal items within reach   clutter free environment maintained   fall prevention program maintained   lighting adjusted   nonskid shoes/slippers when out of bed   room organization consistent   safety round/check completed  Taken 11/14/2022 1435 by Judy Ashford RN  Safety Promotion/Fall Prevention:   assistive device/personal items within reach   clutter free  environment maintained   fall prevention program maintained   lighting adjusted   nonskid shoes/slippers when out of bed   room organization consistent   safety round/check completed  Taken 11/14/2022 1220 by Judy Ashford RN  Safety Promotion/Fall Prevention:   assistive device/personal items within reach   clutter free environment maintained   fall prevention program maintained   lighting adjusted   nonskid shoes/slippers when out of bed   room organization consistent   safety round/check completed  Taken 11/14/2022 1025 by Judy Ashford RN  Safety Promotion/Fall Prevention:   assistive device/personal items within reach   clutter free environment maintained   fall prevention program maintained   lighting adjusted   nonskid shoes/slippers when out of bed   room organization consistent   safety round/check completed  Taken 11/14/2022 0819 by Judy Ashford RN  Safety Promotion/Fall Prevention:   assistive device/personal items within reach   clutter free environment maintained   fall prevention program maintained   lighting adjusted   nonskid shoes/slippers when out of bed   room organization consistent   safety round/check completed     Problem: Skin Injury Risk Increased  Goal: Skin Health and Integrity  Intervention: Optimize Skin Protection  Recent Flowsheet Documentation  Taken 11/14/2022 1632 by Judy Ashford RN  Head of Bed (HOB) Positioning: HOB elevated  Taken 11/14/2022 1435 by Judy Ashford RN  Head of Bed (HOB) Positioning: HOB elevated  Taken 11/14/2022 1220 by Judy Ashford RN  Head of Bed (HOB) Positioning: HOB elevated  Taken 11/14/2022 1025 by Judy Ashford RN  Head of Bed (HOB) Positioning: HOB elevated  Taken 11/14/2022 0819 by Judy Ashford RN  Pressure Reduction Techniques: frequent weight shift encouraged  Head of Bed (HOB) Positioning: HOB elevated  Pressure Reduction Devices: pressure-redistributing mattress utilized   Goal Outcome Evaluation:         Pt still on NS @ 75. L leg pain. CT  ordered. MD aware.

## 2022-11-14 NOTE — PROGRESS NOTES
Hematology/Oncology Inpatient Progress Note    PATIENT NAME: Nieves Mc  : 1975  MRN: [id number]    CHIEF COMPLAINT: Chest pain    HISTORY OF PRESENT ILLNESS:      Nieves Mc is a 47 y.o. female who presented to [facility] on 11/3/2022 with complaints of chest pain.  Past medical history significant for CML, depression with anxiety, PE, type 2 diabetes mellitus.  Patient reported that around 9 AM the day of presentation she began to have chest pain.  Stated it was accompanied by the loss of hearing in her left ear and left arm numbness.  She reported she has never experienced this previous.  Since being in the emergency department the chest pain has subsided.  She describes the chest pain as dull.  She did not take anything at home to try to alleviate it.  She reported chronic leg swelling but denied any recent weight gain.  She reported shortness of breath.  She reported chronic cough.  She denied any fever or chills.  She also complained of nausea, vomiting and diarrhea. She was most recently seen in the emergency department on 10/31/2022 for abdominal pain at the instruction of her oncologist.  A CT of the abdomen and pelvis was fairly unchanged from prior and showed severe generalized anasarca and severe hepatosplenomegaly.  A CT of the chest was performed to rule out pulmonary embolism which was also negative and therefore the patient was discharged home.     Evaluation in the ED showed a negative troponin less than 0.010, elevated proBNP at 29,519, glucose 411, normal kidney function, elevated alk phos 1826.  WBC elevated at 376.70, hemoglobin 7.8, MCV 82.8, platelets 404,000, ANC 48.97, blasts 66%.  EKG showed sinus tachycardia, chest x-ray showed cardiomegaly and mild pulmonary vascular congestion which is increased in comparison.  The patient received Lasix, regular insulin, Nitrostat and a normal saline 250 mL bolus in the ED.  She was admitted to the hospital for further  evaluation and treatment.     11/03/22  Hematology/Oncology was consulted on a patient known to us and followed in the office by Dr. Lerma for her diagnosis of CML currently on imatinib and hydroxyurea.  Patient was initially seen in consultation in October 2018 while she was inpatient at [facility] with CML.  At that time she was under the care of Dr. Roach and  and was managed on imatinib.  Patient had a repeat bone marrow aspiration and biopsy in December 2018 that was consistent with chronic myeloid leukemia.  BCR ABL positive, chronic phase.  ABL kinase mutational analysis negative.  Patient was initiated on Tasigna in February 2019 but this was discontinued in June 2022 due to diagnosis of cardiomyopathy.  At that time it was determined that the imatinib would be the safer treatment option and she was reinitiated on this.  Her course has been complicated due to noncompliance and difficulty tolerating TKI's.    Flow Cytometry 11/3/22 - increased neutrophilic cells, increased atypical myeloid blasts (13% of leukocytes), aberrant CD56 expression on granulocytes and monocytes, increased basophils (7% of leukocytes)     11/4/22: Imatinib 400 mg daily restarted     11/5/22: Continues Hydrea 2500 mg daily, Imatinib 400 mg daily, allopurinol 100 mg daily. Patient denies chest pain or SOB. Discussed with patient that WBC is 284.9 today. Patient relays that she started her home supply of imatinib yesterday at 400 mg.      She  has a past medical history of Bone pain, COVID-19 virus infection (01/12/2022), Diabetes mellitus (HCC), Extremity pain, Leg pain, Migraine, Pulmonary embolism (HCC), and Vision loss.     PCP: Alida Latham APRN    History of present illness was reviewed and is unchanged from the previous visit. 11/05/22      Subjective        Patient denies any new issues today    MEDICATIONS:    Scheduled Meds:  albuterol, 10 mg, Nebulization, Once  allopurinol, 100 mg, Oral, Q8H  aspirin, 325 mg, Oral,  "Daily  budesonide-formoterol, 2 puff, Inhalation, BID - RT  carvedilol, 6.25 mg, Oral, BID With Meals  citalopram, 10 mg, Oral, Daily  enoxaparin, 80 mg, Subcutaneous, Q12H  fentaNYL, 1 patch, Transdermal, Q72H  First Mouthwash (Magic Mouthwash), 5 mL, Swish & Spit, Q4H  folic acid, 1 mg, Oral, Daily  guaiFENesin, 600 mg, Oral, Q12H  imatinib, 400 mg, Oral, Daily   And  imatinib, 200 mg, Oral, Daily  insulin glargine, 30 Units, Subcutaneous, Daily  insulin lispro, 10 Units, Subcutaneous, TID AC  insulin lispro, 2-9 Units, Subcutaneous, TID With Meals  mirtazapine, 30 mg, Oral, Nightly  sodium chloride, 250 mL, Intravenous, Once  sodium chloride, 10 mL, Intravenous, Q12H       Continuous Infusions:  Pharmacy to Dose enoxaparin (LOVENOX),   sodium chloride, 75 mL/hr, Last Rate: 75 mL/hr (11/14/22 0120)       PRN Meds:  •  acetaminophen **OR** acetaminophen **OR** acetaminophen  •  ALPRAZolam  •  aluminum-magnesium hydroxide-simethicone  •  dextrose  •  dextrose  •  glucagon (human recombinant)  •  HYDROcodone-acetaminophen  •  influenza vaccine  •  melatonin  •  morphine  •  nitroglycerin  •  ondansetron **OR** ondansetron  •  Pharmacy to Dose enoxaparin (LOVENOX)  •  sodium chloride  •  sodium chloride     ALLERGIES:    Allergies   Allergen Reactions   • Hydromorphone GI Intolerance     dilaudid   • Morphine Nausea And Vomiting   • Propofol Hives     Previously tolerated being premedicated with diphenhydramine and famotadine       Objective    VITALS:   /76 (BP Location: Right arm, Patient Position: Lying)   Pulse 113   Temp 98.4 °F (36.9 °C) (Oral)   Resp 17   Ht 162.6 cm (64\")   Wt 79 kg (174 lb 2.6 oz)   LMP 05/25/2022 (Approximate)   SpO2 97%   BMI 29.90 kg/m²     PHYSICAL EXAM:     Physical Exam  Constitutional:       Appearance: Normal appearance. She is ill-appearing.      Comments: Frail, temporal muscle wasting   HENT:      Head: Normocephalic and atraumatic.      Mouth/Throat:      Mouth: " Mucous membranes are moist.   Eyes:      Pupils: Pupils are equal, round, and reactive to light.   Cardiovascular:      Rate and Rhythm: Normal rate and regular rhythm.      Pulses: Normal pulses.      Heart sounds: No murmur heard.  Pulmonary:      Effort: Pulmonary effort is normal.      Breath sounds: Normal breath sounds.   Abdominal:      General: There is no distension.      Palpations: Abdomen is soft. There is no mass.      Tenderness: There is no abdominal tenderness.   Musculoskeletal:         General: Normal range of motion.      Cervical back: Normal range of motion and neck supple.   Skin:     General: Skin is warm.   Neurological:      General: No focal deficit present.      Mental Status: She is alert.   Psychiatric:         Mood and Affect: Mood normal.     I have reexamined the patient and the results are consistent with the previously documented exam. Meghann Lerma MD     RECENT LABS:    Lab Results (last 24 hours)     Procedure Component Value Units Date/Time    POC Glucose Once [[id number]]  (Abnormal) Collected: 11/14/22 1710    Specimen: Blood Updated: 11/14/22 1711     Glucose 146 mg/dL      Comment: Serial Number: [id number]:  [id number]       POC Glucose Once [[id number]]  (Abnormal) Collected: 11/14/22 1207    Specimen: Blood Updated: 11/14/22 1207     Glucose 115 mg/dL      Comment: Serial Number: [id number]:  [id number]       Uric Acid [[id number]]  (Abnormal) Collected: 11/14/22 0709    Specimen: Blood Updated: 11/14/22 1004     Uric Acid 7.1 mg/dL     Manual Differential [[id number]]  (Abnormal) Collected: 11/14/22 0709    Specimen: Blood Updated: 11/14/22 0838     Neutrophil % 35.0 %      Lymphocyte % 4.0 %      Eosinophil % 8.0 %      Basophil % 10.0 %      Bands %  19.0 %      Metamyelocyte % 5.0 %      Myelocyte % 9.0 %      Promyelocyte % 1.0 %      Blasts % 9.0 %      Neutrophils Absolute 120.31 10*3/mm3      Lymphocytes Absolute 8.91 10*3/mm3      Eosinophils  Absolute 17.82 10*3/mm3      Basophils Absolute 22.28 10*3/mm3      Anisocytosis Slight/1+     Poikilocytes Slight/1+     Polychromasia Slight/1+     WBC Morphology Normal     Platelet Morphology Normal    Narrative:      Reviewed by Pathologist within the past 30 days on 11/01/22.     CBC & Differential [[id number]]  (Abnormal) Collected: 11/14/22 0709    Specimen: Blood Updated: 11/14/22 0838    Narrative:      The following orders were created for panel order CBC & Differential.  Procedure                               Abnormality         Status                     ---------                               -----------         ------                     CBC Auto Differential[[id number]]        Abnormal            Final result               Scan Slide[[id number]]                                       Final result                 Please view results for these tests on the individual orders.    Scan Slide [[id number]] Collected: 11/14/22 0709    Specimen: Blood Updated: 11/14/22 0838     Scan Slide --     Comment: See Manual Differential Results       CBC Auto Differential [[id number]]  (Abnormal) Collected: 11/14/22 0709    Specimen: Blood Updated: 11/14/22 0838     .80 10*3/mm3      RBC 3.30 10*6/mm3      Hemoglobin 7.5 g/dL      Hematocrit 26.9 %      MCV 81.6 fL      MCH 22.7 pg      MCHC 27.8 g/dL      RDW 20.3 %      RDW-SD 56.9 fl      MPV 8.0 fL      Platelets 257 10*3/mm3     Narrative:      Modified report. Previous result was Hemogram on 11/14/2022 at 0748 EST.  The previously reported component NRBC is no longer being reported. Previous result was 0.0 /100 WBC (Reference Range: 0.0-0.2 /100 WBC) on 11/14/2022 at 0748 EST.    Comprehensive Metabolic Panel [[id number]]  (Abnormal) Collected: 11/14/22 0709    Specimen: Blood Updated: 11/14/22 0802     Glucose 237 mg/dL      BUN 31 mg/dL      Creatinine 1.03 mg/dL      Sodium 138 mmol/L      Potassium 4.7 mmol/L      Chloride 106 mmol/L      CO2 20.0 mmol/L       Calcium 8.1 mg/dL      Total Protein 6.1 g/dL      Albumin 3.30 g/dL      ALT (SGPT) 38 U/L      AST (SGOT) 37 U/L      Alkaline Phosphatase 1,032 U/L      Total Bilirubin 0.5 mg/dL      Globulin 2.8 gm/dL      A/G Ratio 1.2 g/dL      BUN/Creatinine Ratio 30.1     Anion Gap 12.0 mmol/L      eGFR 67.6 mL/min/1.73      Comment: National Kidney Foundation and American Society of Nephrology (ASN) Task Force recommended calculation based on the Chronic Kidney Disease Epidemiology Collaboration (CKD-EPI) equation refit without adjustment for race.       Narrative:      GFR Normal >60  Chronic Kidney Disease <60  Kidney Failure <15      Phosphorus [[id number]]  (Normal) Collected: 11/14/22 0709    Specimen: Blood Updated: 11/14/22 0801     Phosphorus 3.9 mg/dL     POC Glucose Once [[id number]]  (Abnormal) Collected: 11/14/22 0753    Specimen: Blood Updated: 11/14/22 0754     Glucose 196 mg/dL      Comment: Serial Number: [id number]:  [id number]       POC Glucose Once [[id number]]  (Abnormal) Collected: 11/13/22 1932    Specimen: Blood Updated: 11/13/22 1933     Glucose 179 mg/dL      Comment: Serial Number: [id number]:  [id number]             PENDING RESULTS: Flow cytometry    IMAGING REVIEWED:  XR Femur 2 View Left    Result Date: 11/14/2022  No acute abnormality of the left femur. No suspicious osseous abnormality is identified.  Electronically Signed By-Heather Kelsey MD On:11/14/2022 3:47 PM This report was finalized on [id number] by  Heather Kelsey MD.    XR Tibia Fibula 2 View Left    Result Date: 11/14/2022  Negative tibial fibula radiograph.  Electronically Signed By-Heather Kelsey MD On:11/14/2022 3:47 PM This report was finalized on [id number] by  Heather Kelsey MD.    CT Chest Without Contrast Diagnostic    Result Date: 11/13/2022  1. Cardiomegaly and mild pulmonary edema. 2. Diffuse anasarca. 3. Hepatosplenomegaly.  Electronically Signed By-Zac Turcios MD On:11/13/2022 2:17 PM This report  was finalized on [id number] by  Zac Turcios MD.      Assessment & Plan      ASSESSMENT:    Hematology/Oncology was consulted on a patient known to us and followed in the office by Dr. Lerma for her diagnosis of CML currently on imatinib and hydroxyurea.  Patient has been very noncompliant with treatments.  She is currently on Gleevec 400 mg daily but this was recently increased to 600 mg daily.  She is also supposed to be on hydroxyurea twice a day.  She has been noncompliant with medication intake and also routine follow-up in the office for ongoing care.  Patient has had progressive shortness of breath and swelling on her lower extremities.    1. CML not in remission: Currently on imatinib 600 mg daily.  Hydroxyurea is currently on hold. Patient has been noncompliant with her medicines in the past.  Continue Gleevec 600 mg p.o. daily. Improving WBC to 225  2. Oral lesion likely leukemic cells improving on Gleevec continue the same. Reviewed  3. Acute kidney injury: Nephrologist on board.  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  On allopurinol creatinine improving.  Today improved  4. Hyperkalemia: likely secondary to entresto/ANAYA/TLS, now normalized  5. Anemia: Secondary to CML and TKI, hydroxyurea.    6. Hyperleukocytosis: Secondary to CML, beginning to improve on Gleevec  7. History of pulmonary embolism: On Xarelto as an outpatient.  Lovenox currently.  8. Acute on chronic diastolic heart failure: Management per primary team and cardiology.  9. Multiple chronic conditions: Type 2 diabetes mellitus, hypertension, depression with anxiety.  10. Chest wall pain: unclear etiology, CT chest non contrast ordered.     PLANS:  1. Continue Daily CBCs  2. Check mag level, phosphorus and daily uric acid  3. Reviewed results of flow cytometry  4. Continue Gleevec 600 mg p.o. daily  5. She has been seen by psychiatrist and currently on Celexa 10 mg daily.  Remeron increased to 30 mg at night.  Outpatient  counseling has been recommended  6. Continue Allopurinol, uric acid now improved to 8.4.    Electronically signed by Meghann Lerma MD, 11/14/22, 5:16 PM EST.

## 2022-11-15 ENCOUNTER — APPOINTMENT (OUTPATIENT)
Dept: CARDIOLOGY | Facility: HOSPITAL | Age: 47
End: 2022-11-15

## 2022-11-15 LAB
ALBUMIN SERPL-MCNC: 3.6 G/DL (ref 3.5–5.2)
ALBUMIN/GLOB SERPL: 1.2 G/DL
ALP SERPL-CCNC: 1061 U/L (ref 39–117)
ALT SERPL W P-5'-P-CCNC: 44 U/L (ref 1–33)
ANION GAP SERPL CALCULATED.3IONS-SCNC: 11 MMOL/L (ref 5–15)
ANISOCYTOSIS BLD QL: ABNORMAL
AST SERPL-CCNC: 47 U/L (ref 1–32)
BASOPHILS # BLD MANUAL: 40.49 10*3/MM3 (ref 0–0.2)
BASOPHILS NFR BLD MANUAL: 19 % (ref 0–1.5)
BH CV LOW VAS RIGHT LESSER SAPH VESSEL: 1
BH CV LOWER VASCULAR LEFT COMMON FEMORAL AUGMENT: NORMAL
BH CV LOWER VASCULAR LEFT COMMON FEMORAL COMPETENT: NORMAL
BH CV LOWER VASCULAR LEFT COMMON FEMORAL COMPRESS: NORMAL
BH CV LOWER VASCULAR LEFT COMMON FEMORAL PHASIC: NORMAL
BH CV LOWER VASCULAR LEFT COMMON FEMORAL SPONT: NORMAL
BH CV LOWER VASCULAR LEFT DISTAL FEMORAL COMPRESS: NORMAL
BH CV LOWER VASCULAR LEFT GASTRONEMIUS COMPRESS: NORMAL
BH CV LOWER VASCULAR LEFT GREATER SAPH AK COMPRESS: NORMAL
BH CV LOWER VASCULAR LEFT GREATER SAPH BK COMPRESS: NORMAL
BH CV LOWER VASCULAR LEFT LESSER SAPH COMPRESS: NORMAL
BH CV LOWER VASCULAR LEFT MID FEMORAL AUGMENT: NORMAL
BH CV LOWER VASCULAR LEFT MID FEMORAL COMPETENT: NORMAL
BH CV LOWER VASCULAR LEFT MID FEMORAL COMPRESS: NORMAL
BH CV LOWER VASCULAR LEFT MID FEMORAL PHASIC: NORMAL
BH CV LOWER VASCULAR LEFT MID FEMORAL SPONT: NORMAL
BH CV LOWER VASCULAR LEFT PERONEAL COMPRESS: NORMAL
BH CV LOWER VASCULAR LEFT POPLITEAL AUGMENT: NORMAL
BH CV LOWER VASCULAR LEFT POPLITEAL COMPETENT: NORMAL
BH CV LOWER VASCULAR LEFT POPLITEAL COMPRESS: NORMAL
BH CV LOWER VASCULAR LEFT POPLITEAL PHASIC: NORMAL
BH CV LOWER VASCULAR LEFT POPLITEAL SPONT: NORMAL
BH CV LOWER VASCULAR LEFT POSTERIOR TIBIAL COMPRESS: NORMAL
BH CV LOWER VASCULAR LEFT PROXIMAL FEMORAL COMPRESS: NORMAL
BH CV LOWER VASCULAR LEFT SAPHENOFEMORAL JUNCTION COMPRESS: NORMAL
BH CV LOWER VASCULAR RIGHT COMMON FEMORAL AUGMENT: NORMAL
BH CV LOWER VASCULAR RIGHT COMMON FEMORAL COMPETENT: NORMAL
BH CV LOWER VASCULAR RIGHT COMMON FEMORAL COMPRESS: NORMAL
BH CV LOWER VASCULAR RIGHT COMMON FEMORAL PHASIC: NORMAL
BH CV LOWER VASCULAR RIGHT COMMON FEMORAL SPONT: NORMAL
BH CV LOWER VASCULAR RIGHT DISTAL FEMORAL COMPRESS: NORMAL
BH CV LOWER VASCULAR RIGHT GASTRONEMIUS COMPRESS: NORMAL
BH CV LOWER VASCULAR RIGHT GREATER SAPH AK COMPRESS: NORMAL
BH CV LOWER VASCULAR RIGHT GREATER SAPH BK COMPRESS: NORMAL
BH CV LOWER VASCULAR RIGHT LESSER SAPH COMPRESS: NORMAL
BH CV LOWER VASCULAR RIGHT LESSER SAPH THROMBUS: NORMAL
BH CV LOWER VASCULAR RIGHT MID FEMORAL AUGMENT: NORMAL
BH CV LOWER VASCULAR RIGHT MID FEMORAL COMPETENT: NORMAL
BH CV LOWER VASCULAR RIGHT MID FEMORAL COMPRESS: NORMAL
BH CV LOWER VASCULAR RIGHT MID FEMORAL PHASIC: NORMAL
BH CV LOWER VASCULAR RIGHT MID FEMORAL SPONT: NORMAL
BH CV LOWER VASCULAR RIGHT PERONEAL COMPRESS: NORMAL
BH CV LOWER VASCULAR RIGHT POPLITEAL AUGMENT: NORMAL
BH CV LOWER VASCULAR RIGHT POPLITEAL COMPETENT: NORMAL
BH CV LOWER VASCULAR RIGHT POPLITEAL COMPRESS: NORMAL
BH CV LOWER VASCULAR RIGHT POPLITEAL PHASIC: NORMAL
BH CV LOWER VASCULAR RIGHT POPLITEAL SPONT: NORMAL
BH CV LOWER VASCULAR RIGHT POSTERIOR TIBIAL COMPRESS: NORMAL
BH CV LOWER VASCULAR RIGHT PROXIMAL FEMORAL COMPRESS: NORMAL
BH CV LOWER VASCULAR RIGHT SAPHENOFEMORAL JUNCTION COMPRESS: NORMAL
BILIRUB SERPL-MCNC: 0.7 MG/DL (ref 0–1.2)
BLASTS NFR BLD MANUAL: 2 % (ref 0–0)
BUN SERPL-MCNC: 30 MG/DL (ref 6–20)
BUN/CREAT SERPL: 28.6 (ref 7–25)
CALCIUM SPEC-SCNC: 8.6 MG/DL (ref 8.6–10.5)
CHLORIDE SERPL-SCNC: 107 MMOL/L (ref 98–107)
CO2 SERPL-SCNC: 20 MMOL/L (ref 22–29)
CREAT SERPL-MCNC: 1.05 MG/DL (ref 0.57–1)
DEPRECATED RDW RBC AUTO: 57.8 FL (ref 37–54)
EGFRCR SERPLBLD CKD-EPI 2021: 66.1 ML/MIN/1.73
EOSINOPHIL # BLD MANUAL: 12.79 10*3/MM3 (ref 0–0.4)
EOSINOPHIL NFR BLD MANUAL: 6 % (ref 0.3–6.2)
ERYTHROCYTE [DISTWIDTH] IN BLOOD BY AUTOMATED COUNT: 20 % (ref 12.3–15.4)
GLOBULIN UR ELPH-MCNC: 3.1 GM/DL
GLUCOSE BLDC GLUCOMTR-MCNC: 135 MG/DL (ref 70–105)
GLUCOSE BLDC GLUCOMTR-MCNC: 177 MG/DL (ref 70–105)
GLUCOSE BLDC GLUCOMTR-MCNC: 216 MG/DL (ref 70–105)
GLUCOSE SERPL-MCNC: 195 MG/DL (ref 65–99)
HCT VFR BLD AUTO: 25.6 % (ref 34–46.6)
HGB BLD-MCNC: 7.3 G/DL (ref 12–15.9)
LYMPHOCYTES # BLD MANUAL: 4.26 10*3/MM3 (ref 0.7–3.1)
LYMPHOCYTES NFR BLD MANUAL: 1 % (ref 5–12)
MAXIMAL PREDICTED HEART RATE: 173 BPM
MCH RBC QN AUTO: 22.7 PG (ref 26.6–33)
MCHC RBC AUTO-ENTMCNC: 28.6 G/DL (ref 31.5–35.7)
MCV RBC AUTO: 79.1 FL (ref 79–97)
METAMYELOCYTES NFR BLD MANUAL: 13 % (ref 0–0)
MICROCYTES BLD QL: ABNORMAL
MONOCYTES # BLD: 2.13 10*3/MM3 (ref 0.1–0.9)
MYELOCYTES NFR BLD MANUAL: 5 % (ref 0–0)
NEUTROPHILS # BLD AUTO: 108.68 10*3/MM3 (ref 1.7–7)
NEUTROPHILS NFR BLD MANUAL: 18 % (ref 42.7–76)
NEUTS BAND NFR BLD MANUAL: 33 % (ref 0–5)
PHOSPHATE SERPL-MCNC: 4.1 MG/DL (ref 2.5–4.5)
PLAT MORPH BLD: NORMAL
PLATELET # BLD AUTO: 249 10*3/MM3 (ref 140–450)
PMV BLD AUTO: 8.1 FL (ref 6–12)
POIKILOCYTOSIS BLD QL SMEAR: ABNORMAL
POTASSIUM SERPL-SCNC: 4.9 MMOL/L (ref 3.5–5.2)
PROMYELOCYTES NFR BLD MANUAL: 1 % (ref 0–0)
PROT SERPL-MCNC: 6.7 G/DL (ref 6–8.5)
RBC # BLD AUTO: 3.24 10*6/MM3 (ref 3.77–5.28)
SCAN SLIDE: NORMAL
SODIUM SERPL-SCNC: 138 MMOL/L (ref 136–145)
STRESS TARGET HR: 147 BPM
URATE SERPL-MCNC: 6.7 MG/DL (ref 2.4–5.7)
VARIANT LYMPHS NFR BLD MANUAL: 2 % (ref 19.6–45.3)
WBC MORPH BLD: NORMAL
WBC NRBC COR # BLD: 213.1 10*3/MM3 (ref 3.4–10.8)

## 2022-11-15 PROCEDURE — 63710000001 INSULIN GLARGINE PER 5 UNITS: Performed by: NURSE PRACTITIONER

## 2022-11-15 PROCEDURE — 85007 BL SMEAR W/DIFF WBC COUNT: CPT | Performed by: INTERNAL MEDICINE

## 2022-11-15 PROCEDURE — 94799 UNLISTED PULMONARY SVC/PX: CPT

## 2022-11-15 PROCEDURE — 93970 EXTREMITY STUDY: CPT

## 2022-11-15 PROCEDURE — 94760 N-INVAS EAR/PLS OXIMETRY 1: CPT

## 2022-11-15 PROCEDURE — 25010000002 MORPHINE PER 10 MG: Performed by: INTERNAL MEDICINE

## 2022-11-15 PROCEDURE — 84550 ASSAY OF BLOOD/URIC ACID: CPT | Performed by: INTERNAL MEDICINE

## 2022-11-15 PROCEDURE — 25010000002 ENOXAPARIN PER 10 MG: Performed by: INTERNAL MEDICINE

## 2022-11-15 PROCEDURE — 82962 GLUCOSE BLOOD TEST: CPT

## 2022-11-15 PROCEDURE — 99232 SBSQ HOSP IP/OBS MODERATE 35: CPT | Performed by: INTERNAL MEDICINE

## 2022-11-15 PROCEDURE — 99233 SBSQ HOSP IP/OBS HIGH 50: CPT | Performed by: INTERNAL MEDICINE

## 2022-11-15 PROCEDURE — 84100 ASSAY OF PHOSPHORUS: CPT | Performed by: INTERNAL MEDICINE

## 2022-11-15 PROCEDURE — 63710000001 INSULIN LISPRO (HUMAN) PER 5 UNITS: Performed by: NURSE PRACTITIONER

## 2022-11-15 PROCEDURE — 80053 COMPREHEN METABOLIC PANEL: CPT | Performed by: INTERNAL MEDICINE

## 2022-11-15 PROCEDURE — 94664 DEMO&/EVAL PT USE INHALER: CPT

## 2022-11-15 PROCEDURE — 85025 COMPLETE CBC W/AUTO DIFF WBC: CPT | Performed by: INTERNAL MEDICINE

## 2022-11-15 RX ORDER — ENOXAPARIN SODIUM 100 MG/ML
70 INJECTION SUBCUTANEOUS EVERY 12 HOURS
Status: DISCONTINUED | OUTPATIENT
Start: 2022-11-15 | End: 2022-11-17 | Stop reason: HOSPADM

## 2022-11-15 RX ORDER — HYDROCODONE BITARTRATE AND ACETAMINOPHEN 7.5; 325 MG/1; MG/1
1 TABLET ORAL EVERY 6 HOURS PRN
Status: DISCONTINUED | OUTPATIENT
Start: 2022-11-15 | End: 2022-11-16

## 2022-11-15 RX ADMIN — SODIUM CHLORIDE 75 ML/HR: 9 INJECTION, SOLUTION INTRAVENOUS at 03:25

## 2022-11-15 RX ADMIN — GUAIFENESIN 600 MG: 600 TABLET, EXTENDED RELEASE ORAL at 09:18

## 2022-11-15 RX ADMIN — INSULIN LISPRO 4 UNITS: 100 INJECTION, SOLUTION INTRAVENOUS; SUBCUTANEOUS at 09:26

## 2022-11-15 RX ADMIN — ASPIRIN 325 MG: 325 TABLET, COATED ORAL at 09:17

## 2022-11-15 RX ADMIN — ENOXAPARIN SODIUM 70 MG: 100 INJECTION SUBCUTANEOUS at 11:48

## 2022-11-15 RX ADMIN — GUAIFENESIN 600 MG: 600 TABLET, EXTENDED RELEASE ORAL at 20:26

## 2022-11-15 RX ADMIN — BUDESONIDE AND FORMOTEROL FUMARATE DIHYDRATE 2 PUFF: 160; 4.5 AEROSOL RESPIRATORY (INHALATION) at 07:21

## 2022-11-15 RX ADMIN — ALLOPURINOL 100 MG: 100 TABLET ORAL at 05:14

## 2022-11-15 RX ADMIN — MORPHINE SULFATE 2 MG: 2 INJECTION, SOLUTION INTRAMUSCULAR; INTRAVENOUS at 17:01

## 2022-11-15 RX ADMIN — Medication 10 ML: at 09:20

## 2022-11-15 RX ADMIN — HYDROCODONE BITARTRATE AND ACETAMINOPHEN 1 TABLET: 7.5; 325 TABLET ORAL at 11:48

## 2022-11-15 RX ADMIN — MORPHINE SULFATE 2 MG: 2 INJECTION, SOLUTION INTRAMUSCULAR; INTRAVENOUS at 21:50

## 2022-11-15 RX ADMIN — HYDROCODONE BITARTRATE AND ACETAMINOPHEN 1 TABLET: 7.5; 325 TABLET ORAL at 20:26

## 2022-11-15 RX ADMIN — ALLOPURINOL 100 MG: 100 TABLET ORAL at 21:50

## 2022-11-15 RX ADMIN — ALLOPURINOL 100 MG: 100 TABLET ORAL at 13:46

## 2022-11-15 RX ADMIN — IMATINIB MESYLATE 200 MG: 100 TABLET, FILM COATED ORAL at 09:51

## 2022-11-15 RX ADMIN — MORPHINE SULFATE 2 MG: 2 INJECTION, SOLUTION INTRAMUSCULAR; INTRAVENOUS at 09:11

## 2022-11-15 RX ADMIN — MORPHINE SULFATE 2 MG: 2 INJECTION, SOLUTION INTRAMUSCULAR; INTRAVENOUS at 05:14

## 2022-11-15 RX ADMIN — FOLIC ACID 1 MG: 1 TABLET ORAL at 09:17

## 2022-11-15 RX ADMIN — MIRTAZAPINE 30 MG: 15 TABLET, FILM COATED ORAL at 20:26

## 2022-11-15 RX ADMIN — ENOXAPARIN SODIUM 80 MG: 100 INJECTION SUBCUTANEOUS at 00:08

## 2022-11-15 RX ADMIN — IMATINIB MESYLATE 400 MG: 400 TABLET, FILM COATED ORAL at 09:50

## 2022-11-15 RX ADMIN — INSULIN GLARGINE 30 UNITS: 100 INJECTION, SOLUTION SUBCUTANEOUS at 09:17

## 2022-11-15 RX ADMIN — ALPRAZOLAM 0.5 MG: 0.5 TABLET ORAL at 20:26

## 2022-11-15 RX ADMIN — MORPHINE SULFATE 2 MG: 2 INJECTION, SOLUTION INTRAMUSCULAR; INTRAVENOUS at 00:08

## 2022-11-15 RX ADMIN — Medication 10 ML: at 20:26

## 2022-11-15 RX ADMIN — CARVEDILOL 6.25 MG: 6.25 TABLET, FILM COATED ORAL at 09:17

## 2022-11-15 RX ADMIN — CARVEDILOL 6.25 MG: 6.25 TABLET, FILM COATED ORAL at 17:01

## 2022-11-15 RX ADMIN — HYDROCODONE BITARTRATE AND ACETAMINOPHEN 1 TABLET: 7.5; 325 TABLET ORAL at 03:22

## 2022-11-15 RX ADMIN — INSULIN LISPRO 2 UNITS: 100 INJECTION, SOLUTION INTRAVENOUS; SUBCUTANEOUS at 17:14

## 2022-11-15 RX ADMIN — CITALOPRAM HYDROBROMIDE 10 MG: 20 TABLET ORAL at 09:18

## 2022-11-15 NOTE — PROGRESS NOTES
Reason for follow-up: CHF     Patient Care Team:  Alida Latham APRN as PCP - General (Nurse Practitioner)  Meghann Lerma MD as Consulting Physician (Hematology and Oncology)  Hamida Feldman MD as Consulting Physician (Cardiology)    Subjective .   Nieves Mc is very emotional today, complaining of leg pain     ROS    Hydromorphone, Morphine, and Propofol    Scheduled Meds:albuterol, 10 mg, Nebulization, Once  allopurinol, 100 mg, Oral, Q8H  aspirin, 325 mg, Oral, Daily  budesonide-formoterol, 2 puff, Inhalation, BID - RT  carvedilol, 6.25 mg, Oral, BID With Meals  citalopram, 10 mg, Oral, Daily  enoxaparin, 70 mg, Subcutaneous, Q12H  fentaNYL, 1 patch, Transdermal, Q72H  First Mouthwash (Magic Mouthwash), 5 mL, Swish & Spit, Q4H  folic acid, 1 mg, Oral, Daily  guaiFENesin, 600 mg, Oral, Q12H  imatinib, 400 mg, Oral, Daily   And  imatinib, 200 mg, Oral, Daily  insulin glargine, 30 Units, Subcutaneous, Daily  insulin lispro, 10 Units, Subcutaneous, TID AC  insulin lispro, 2-9 Units, Subcutaneous, TID With Meals  mirtazapine, 30 mg, Oral, Nightly  sodium chloride, 250 mL, Intravenous, Once  sodium chloride, 10 mL, Intravenous, Q12H      Continuous Infusions:Pharmacy to Dose enoxaparin (LOVENOX),   sodium chloride, 75 mL/hr, Last Rate: 75 mL/hr (11/15/22 0325)      PRN Meds:.•  acetaminophen **OR** acetaminophen **OR** acetaminophen  •  ALPRAZolam  •  aluminum-magnesium hydroxide-simethicone  •  dextrose  •  dextrose  •  glucagon (human recombinant)  •  HYDROcodone-acetaminophen  •  influenza vaccine  •  melatonin  •  morphine  •  nitroglycerin  •  ondansetron **OR** ondansetron  •  Pharmacy to Dose enoxaparin (LOVENOX)  •  sodium chloride  •  sodium chloride      VITAL SIGNS  Vitals:    11/15/22 0901 11/15/22 0915 11/15/22 1400 11/15/22 1700   BP:  108/67 100/64 115/66   BP Location:  Right arm Right arm Right arm   Patient Position:  Lying Lying Lying   Pulse:  101 102 103   Resp:  18 18  "20   Temp:  97.1 °F (36.2 °C) 96.5 °F (35.8 °C) 97.1 °F (36.2 °C)   TempSrc:  Oral Oral Oral   SpO2:  96% 95% 97%   Weight: 68 kg (150 lb)      Height:           Flowsheet Rows    Flowsheet Row First Filed Value   Admission Height 162.6 cm (64\") Documented at 11/03/2022 0930   Admission Weight 68 kg (150 lb) Documented at 11/03/2022 0930             Physical Exam  VITALS REVIEWED    General:      well developed, in no acute distress.    Head:      normocephalic and atraumatic.    Eyes:      PERRL/EOM intact, conjunctiva and sclera clear with out nystagmus.    Neck:      no masses, thyromegaly,  trachea central with normal respiratory effort and PMI displaced laterally  Lungs:      Clear  Heart:       Regular rate and rhythm  Msk:      no deformity or scoliosis noted of thoracic or lumbar spine.    Pulses:      pulses normal in all 4 extremities.    Extremities:       2+ edema  Neurologic:      no focal deficits.   alert oriented x3  Skin:      intact without lesions or rashes.    Psych:      alert and cooperative; normal mood and affect; normal attention span and concentration.          LAB RESULTS (LAST 7 DAYS)    CBC  Results from last 7 days   Lab Units 11/15/22  0107 11/14/22  0709 11/13/22  0141 11/11/22  2356 11/11/22  1820 11/11/22  0055 11/10/22  0343   WBC 10*3/mm3 213.10* 222.80* 255.40* 270.60* 298.80* 283.70* 271.70*   RBC 10*6/mm3 3.24* 3.30* 3.38* 3.50* 3.80 3.78 3.77   HEMOGLOBIN g/dL 7.3* 7.5* 7.8* 8.3* 8.5* 9.0* 9.1*   HEMATOCRIT % 25.6* 26.9* 27.1* 28.1* 30.0* 30.6* 30.2*   MCV fL 79.1 81.6 80.2 80.2 78.9* 80.9 80.3   PLATELETS 10*3/mm3 249 257 278 300 339 340 298       BMP  Results from last 7 days   Lab Units 11/15/22  0107 11/14/22  0709 11/13/22  0141 11/11/22  2356 11/11/22  1820 11/11/22  1212 11/11/22  0426 11/11/22  0055   SODIUM mmol/L 138 138 135* 134* 133* 133*  --  133*   POTASSIUM mmol/L 4.9 4.7 5.0 5.1 6.1* 5.9* 4.9 6.2*   CHLORIDE mmol/L 107 106 101 97* 99 97*  --  97*   CO2 mmol/L " 20.0* 20.0* 23.0 25.0 23.0 25.0  --  25.0   BUN mg/dL 30* 31* 42* 55* 53* 51*  --  58*   CREATININE mg/dL 1.05* 1.03* 1.14* 1.88* 1.58* 1.58*  --  1.94*   GLUCOSE mg/dL 195* 237* 190* 221* 320* 129*  --  259*   PHOSPHORUS mg/dL 4.1 3.9 4.6* 6.0* 6.2*  --   --   --        CMP   Results from last 7 days   Lab Units 11/15/22  0107 11/14/22  0709 11/13/22  0141 11/11/22  2356 11/11/22  1820 11/11/22  1212 11/11/22  0426 11/11/22  0055   SODIUM mmol/L 138 138 135* 134* 133* 133*  --  133*   POTASSIUM mmol/L 4.9 4.7 5.0 5.1 6.1* 5.9* 4.9 6.2*   CHLORIDE mmol/L 107 106 101 97* 99 97*  --  97*   CO2 mmol/L 20.0* 20.0* 23.0 25.0 23.0 25.0  --  25.0   BUN mg/dL 30* 31* 42* 55* 53* 51*  --  58*   CREATININE mg/dL 1.05* 1.03* 1.14* 1.88* 1.58* 1.58*  --  1.94*   GLUCOSE mg/dL 195* 237* 190* 221* 320* 129*  --  259*   ALBUMIN g/dL 3.60 3.30* 3.10* 3.30* 3.30*  --   --   --    BILIRUBIN mg/dL 0.7 0.5 0.5 0.6 0.7  --   --   --    ALK PHOS U/L 1,061* 1,032* 1,296* 1,460* 1,532*  --   --   --    AST (SGOT) U/L 47* 37* 44* 45* 59*  --   --   --    ALT (SGPT) U/L 44* 38* 41* 38* 43*  --   --   --          BNP        TROPONIN        CoAg        Creatinine Clearance  Estimated Creatinine Clearance: 62.7 mL/min (A) (by C-G formula based on SCr of 1.05 mg/dL (H)).    ABG          EKG    I personally reviewed the patient's EKG/Telemetry data: Sinus rhythm      Assessment & Plan       Chest pain, unspecified type    CML (chronic myelocytic leukemia) (HCC)    Depression with anxiety    History of pulmonary embolism    Medically noncompliant    Type 2 diabetes mellitus with diabetic polyneuropathy, with long-term current use of insulin (HCC)    Acute diastolic CHF (congestive heart failure) (HCC)    NICM (nonischemic cardiomyopathy) (HCC)    Bilateral lower extremity edema      Hope A Jesusita is a 47-year-old female patient who has CML, nonischemic cardiomyopathy, presented to the hospital with CHF exacerbation.  A stress test was done which  showed no perfusion defects, ejection fraction was low normal.  Patient was started on Entresto as well as Coreg, she was treated with IV diuretics.  Diuretics and Entresto, have been discontinued due to worsening renal function.  Her creatinine is back down to 1 today.  Patient seems to be gaining more fluid.  I would like to repeat echocardiogram to reassess her ejection fraction.  I think she needs to be placed back on diuretics.    I discussed the patients findings and my recommendations with patient and agrees with outlined plan.    Hamida Feldman MD  11/15/22  18:21 EST

## 2022-11-15 NOTE — PLAN OF CARE
Problem: Adult Inpatient Plan of Care  Goal: Plan of Care Review  Outcome: Ongoing, Progressing  Goal: Patient-Specific Goal (Individualized)  Outcome: Ongoing, Progressing  Goal: Absence of Hospital-Acquired Illness or Injury  Outcome: Ongoing, Progressing  Intervention: Identify and Manage Fall Risk  Recent Flowsheet Documentation  Taken 11/15/2022 1620 by Lindsey Goff RN  Safety Promotion/Fall Prevention:   safety round/check completed   nonskid shoes/slippers when out of bed   room organization consistent  Taken 11/15/2022 1402 by Lindsey Goff RN  Safety Promotion/Fall Prevention:   safety round/check completed   nonskid shoes/slippers when out of bed   room organization consistent  Taken 11/15/2022 1000 by Lindsey Goff RN  Safety Promotion/Fall Prevention:   safety round/check completed   nonskid shoes/slippers when out of bed   room organization consistent  Taken 11/15/2022 0800 by Lindsey Goff RN  Safety Promotion/Fall Prevention:   safety round/check completed   nonskid shoes/slippers when out of bed   room organization consistent  Intervention: Prevent Skin Injury  Recent Flowsheet Documentation  Taken 11/15/2022 0800 by Lindsey Goff RN  Body Position: position changed independently  Skin Protection: adhesive use limited  Intervention: Prevent and Manage VTE (Venous Thromboembolism) Risk  Recent Flowsheet Documentation  Taken 11/15/2022 0800 by Lnidsey Goff RN  Activity Management: activity adjusted per tolerance  Range of Motion: active ROM (range of motion) encouraged  Intervention: Prevent Infection  Recent Flowsheet Documentation  Taken 11/15/2022 1620 by Lindsey Goff RN  Infection Prevention:   hand hygiene promoted   rest/sleep promoted   single patient room provided  Taken 11/15/2022 1402 by Lindsey Goff RN  Infection Prevention:   hand hygiene promoted   rest/sleep promoted   single patient room provided  Taken 11/15/2022 1000 by Lindsey Goff RN  Infection Prevention:    hand hygiene promoted   rest/sleep promoted   single patient room provided  Taken 11/15/2022 0800 by Lindsey Goff RN  Infection Prevention:   hand hygiene promoted   rest/sleep promoted   single patient room provided  Goal: Optimal Comfort and Wellbeing  Outcome: Ongoing, Progressing  Intervention: Monitor Pain and Promote Comfort  Recent Flowsheet Documentation  Taken 11/15/2022 0800 by Lindsey Goff RN  Pain Management Interventions: pillow support provided  Intervention: Provide Person-Centered Care  Recent Flowsheet Documentation  Taken 11/15/2022 0800 by Lindsey Goff RN  Trust Relationship/Rapport: care explained  Goal: Readiness for Transition of Care  Outcome: Ongoing, Progressing     Problem: Pain Acute  Goal: Acceptable Pain Control and Functional Ability  Outcome: Ongoing, Progressing  Intervention: Prevent or Manage Pain  Recent Flowsheet Documentation  Taken 11/15/2022 1620 by Lindsey Goff RN  Medication Review/Management: medications reviewed  Taken 11/15/2022 1402 by Lindsey Goff RN  Medication Review/Management: medications reviewed  Taken 11/15/2022 1000 by Lindsey Goff RN  Medication Review/Management: medications reviewed  Taken 11/15/2022 0800 by Lindsey Goff RN  Bowel Elimination Promotion: adequate fluid intake promoted  Sleep/Rest Enhancement: awakenings minimized  Medication Review/Management: medications reviewed  Intervention: Develop Pain Management Plan  Recent Flowsheet Documentation  Taken 11/15/2022 0800 by Lindsey Goff RN  Pain Management Interventions: pillow support provided     Problem: Breathing Pattern Ineffective  Goal: Effective Breathing Pattern  Outcome: Ongoing, Progressing  Intervention: Promote Improved Breathing Pattern  Recent Flowsheet Documentation  Taken 11/15/2022 0800 by Lindsey Goff RN  Head of Bed (HOB) Positioning: HOB elevated     Problem: Skin Injury Risk Increased  Goal: Skin Health and Integrity  Outcome: Ongoing,  Progressing  Intervention: Optimize Skin Protection  Recent Flowsheet Documentation  Taken 11/15/2022 0800 by Lindsey Goff, RN  Pressure Reduction Techniques: frequent weight shift encouraged  Head of Bed (HOB) Positioning: HOB elevated  Pressure Reduction Devices: pressure-redistributing mattress utilized  Skin Protection: adhesive use limited   Goal Outcome Evaluation:

## 2022-11-15 NOTE — PROGRESS NOTES
"NEPHROLOGY PROGRESS NOTE        Patient Care Team:  Alida Latham APRN as PCP - General (Nurse Practitioner)  Meghann Lerma MD as Consulting Physician (Hematology and Oncology)  Hamida Feldman MD as Consulting Physician (Cardiology)      Provider:  Giselle Andre MD  Patient Name: Nieves Mc  :  1975    SUBJECTIVE:  F/u ANAYA/Hyperkalemia  No chest pain or SOA  Creatinine improved to 1.1 today.  On IV fluid normal saline 100/h    Medication:  albuterol, 10 mg, Nebulization, Once  allopurinol, 100 mg, Oral, Q8H  aspirin, 325 mg, Oral, Daily  budesonide-formoterol, 2 puff, Inhalation, BID - RT  carvedilol, 6.25 mg, Oral, BID With Meals  citalopram, 10 mg, Oral, Daily  enoxaparin, 80 mg, Subcutaneous, Q12H  fentaNYL, 1 patch, Transdermal, Q72H  First Mouthwash (Magic Mouthwash), 5 mL, Swish & Spit, Q4H  folic acid, 1 mg, Oral, Daily  guaiFENesin, 600 mg, Oral, Q12H  imatinib, 400 mg, Oral, Daily   And  imatinib, 200 mg, Oral, Daily  insulin glargine, 30 Units, Subcutaneous, Daily  insulin lispro, 10 Units, Subcutaneous, TID AC  insulin lispro, 2-9 Units, Subcutaneous, TID With Meals  mirtazapine, 30 mg, Oral, Nightly  sodium chloride, 250 mL, Intravenous, Once  sodium chloride, 10 mL, Intravenous, Q12H      Pharmacy to Dose enoxaparin (LOVENOX),   sodium chloride, 75 mL/hr, Last Rate: 75 mL/hr (11/15/22 0325)        OBJECTIVE    Vital Sign Min/Max for last 24 hours  Temp  Min: 97.7 °F (36.5 °C)  Max: 98.5 °F (36.9 °C)   BP  Min: 107/63  Max: 134/76   Pulse  Min: 101  Max: 113   Resp  Min: 16  Max: 20   SpO2  Min: 94 %  Max: 97 %   No data recorded   Weight  Min: 82 kg (180 lb 12.4 oz)  Max: 82 kg (180 lb 12.4 oz)     Flowsheet Rows    Flowsheet Row First Filed Value   Admission Height 162.6 cm (64\") Documented at 2022   Admission Weight 68 kg (150 lb) Documented at 2022          I/O this shift:  In: 120 [P.O.:120]  Out: -   I/O last 3 completed shifts:  In:  " [P.O.:1680]  Out: 1500 [Urine:1500]    Physical Exam:  General Appearance: alert, appears stated age and cooperative  Head: normocephalic, without obvious abnormality and atraumatic  Eyes: conjunctivae and sclerae normal and no icterus  Neck: supple and no JVD  Lungs: clear to auscultation and respirations regular  Heart: regular rhythm & normal rate and normal S1, S2  Chest: Wall no abnormalities observed  Abdomen: normal bowel sounds and soft, nontender  Extremities: moves extremities well, trace edema, no cyanosis and no redness  Skin: no bleeding, bruising or rash, turgor normal, color normal and no lesions noted  Neurologic: Alert, and oriented. No focal deficits    Labs:    WBC WBC   Date Value Ref Range Status   11/15/2022 213.10 (C) 3.40 - 10.80 10*3/mm3 Final   11/14/2022 222.80 (C) 3.40 - 10.80 10*3/mm3 Final   11/13/2022 255.40 (C) 3.40 - 10.80 10*3/mm3 Final      HGB Hemoglobin   Date Value Ref Range Status   11/15/2022 7.3 (L) 12.0 - 15.9 g/dL Final   11/14/2022 7.5 (L) 12.0 - 15.9 g/dL Final   11/13/2022 7.8 (L) 12.0 - 15.9 g/dL Final      HCT Hematocrit   Date Value Ref Range Status   11/15/2022 25.6 (L) 34.0 - 46.6 % Final   11/14/2022 26.9 (L) 34.0 - 46.6 % Final   11/13/2022 27.1 (L) 34.0 - 46.6 % Final      Platelets No results found for: LABPLAT   MCV MCV   Date Value Ref Range Status   11/15/2022 79.1 79.0 - 97.0 fL Final   11/14/2022 81.6 79.0 - 97.0 fL Final   11/13/2022 80.2 79.0 - 97.0 fL Final          Sodium Sodium   Date Value Ref Range Status   11/15/2022 138 136 - 145 mmol/L Final   11/14/2022 138 136 - 145 mmol/L Final   11/13/2022 135 (L) 136 - 145 mmol/L Final      Potassium Potassium   Date Value Ref Range Status   11/15/2022 4.9 3.5 - 5.2 mmol/L Final   11/14/2022 4.7 3.5 - 5.2 mmol/L Final   11/13/2022 5.0 3.5 - 5.2 mmol/L Final      Chloride Chloride   Date Value Ref Range Status   11/15/2022 107 98 - 107 mmol/L Final   11/14/2022 106 98 - 107 mmol/L Final   11/13/2022 101 98 -  107 mmol/L Final      CO2 CO2   Date Value Ref Range Status   11/15/2022 20.0 (L) 22.0 - 29.0 mmol/L Final   11/14/2022 20.0 (L) 22.0 - 29.0 mmol/L Final   11/13/2022 23.0 22.0 - 29.0 mmol/L Final      BUN BUN   Date Value Ref Range Status   11/15/2022 30 (H) 6 - 20 mg/dL Final   11/14/2022 31 (H) 6 - 20 mg/dL Final   11/13/2022 42 (H) 6 - 20 mg/dL Final      Creatinine Creatinine   Date Value Ref Range Status   11/15/2022 1.05 (H) 0.57 - 1.00 mg/dL Final   11/14/2022 1.03 (H) 0.57 - 1.00 mg/dL Final   11/13/2022 1.14 (H) 0.57 - 1.00 mg/dL Final      Calcium Calcium   Date Value Ref Range Status   11/15/2022 8.6 8.6 - 10.5 mg/dL Final   11/14/2022 8.1 (L) 8.6 - 10.5 mg/dL Final   11/13/2022 8.2 (L) 8.6 - 10.5 mg/dL Final      PO4 No components found for: PO4   Albumin Albumin   Date Value Ref Range Status   11/15/2022 3.60 3.50 - 5.20 g/dL Final   11/14/2022 3.30 (L) 3.50 - 5.20 g/dL Final   11/13/2022 3.10 (L) 3.50 - 5.20 g/dL Final      Magnesium No results found for: MG   Uric Acid No components found for: URIC ACID     Imaging Results (Last 72 Hours)     Procedure Component Value Units Date/Time    XR Tibia Fibula 2 View Left [[id number]] Collected: 11/14/22 1547     Updated: 11/14/22 1550    Narrative:      DATE OF EXAM:  11/14/2022 3:06 PM     PROCEDURE:  XR TIBIA FIBULA 2 VW LEFT-     INDICATIONS:  new pain. tender; R07.9-Chest pain, unspecified; J81.0-Acute pulmonary  edema; R06.00-Dyspnea, unspecified; D72.829-Elevated white blood cell  count, unspecified; C92.10-Chronic myeloid leukemia, BCR/ABL-positive,  not having achieved remission     COMPARISON:  No Comparisons Available     TECHNIQUE:   2 views of the left tibia and fibula was/were obtained.     FINDINGS:  No left tibia or fibular fracture is seen. The knee and ankle joints  appear appropriately aligned, without appreciable osteoarthritic change.  Overlying soft tissues are normal. No osteolytic or osteoblastic  abnormality.       Impression:       Negative tibial fibula radiograph.     Electronically Signed By-Heather Kelsey MD On:11/14/2022 3:47 PM  This report was finalized on [id number] by  Heather Kelsey MD.    XR Femur 2 View Left [[id number]] Collected: 11/14/22 1545     Updated: 11/14/22 1549    Narrative:      DATE OF EXAM:  11/14/2022 2:59 PM     PROCEDURE:  XR FEMUR 2 VW LEFT-     INDICATIONS:  new onset pain w tenderness; R07.9-Chest pain, unspecified; J81.0-Acute  pulmonary edema; R06.00-Dyspnea, unspecified; D72.829-Elevated white  blood cell count, unspecified; C92.10-Chronic myeloid leukemia,  BCR/ABL-positive, not having achieved remission     COMPARISON:  AP pelvis with left hip radiographs 6/30/2022     TECHNIQUE:   2 views of the left femur was/were obtained.     FINDINGS:  No left femur fracture is seen. Hip and knee joints appear appropriately  aligned. No definite joint effusion. No osteolytic or osteoblastic  abnormality. There may be mild narrowing of the left hip joint space.  Tubal ligation clip is seen in the pelvis on the left. The overlying  left thigh soft tissues appear unremarkable.       Impression:      No acute abnormality of the left femur. No suspicious osseous  abnormality is identified.     Electronically Signed By-Heather Kelsey MD On:11/14/2022 3:47 PM  This report was finalized on [id number] by  Heather Kelsey MD.    CT Chest Without Contrast Diagnostic [[id number]] Collected: 11/13/22 1410     Updated: 11/13/22 1419    Narrative:         DATE OF EXAM:  11/13/2022 1:42 PM     PROCEDURE:  CT CHEST WO CONTRAST DIAGNOSTIC-     INDICATIONS:   Chest wall pain, nontraumatic, infection or inflammation suspected, xray  done; R07.9-Chest pain, unspecified; J81.0-Acute pulmonary edema;  R06.00-Dyspnea, unspecified; D72.829-Elevated white blood cell count,  unspecified; C92.10-Chronic myeloid leukemia, BCR/ABL-positive, not  having achieved remission     COMPARISON:   CT chest with contrast 10/31/2022      TECHNIQUE:  Routine transaxial slices were obtained through the chest without the  administration of intravenous contrast. Reconstructed coronal and  sagittal images were also obtained. Automated exposure control and  iterative construction methods were used.      FINDINGS:  Soft tissues of the lower neck are without acute abnormality. The heart  is enlarged. Negative for mediastinal, hilar, or axillary adenopathy.  There is extensive anasarca similar to prior study. Negative for pleural  effusion.     The trachea and mainstem bronchi are patent. No pneumothorax. Mild  septal thickening consistent with mild pulmonary edema. No consolidation  or findings of pneumonia. No suspicious solid pulmonary nodule. Mild  scattered groundglass opacities related to pulmonary edema.     Upper abdomen demonstrates hepatosplenomegaly. Gallbladder absent. No  free fluid in the upper abdomen. Skin thickening of both breasts similar  to the prior study.       Impression:      1. Cardiomegaly and mild pulmonary edema.  2. Diffuse anasarca.  3. Hepatosplenomegaly.     Electronically Signed By-Zac Turcios MD On:11/13/2022 2:17 PM  This report was finalized on [id number] by  Zac Turcios MD.          Results for orders placed during the hospital encounter of 11/03/22    XR Tibia Fibula 2 View Left    Narrative  DATE OF EXAM:  11/14/2022 3:06 PM    PROCEDURE:  XR TIBIA FIBULA 2 VW LEFT-    INDICATIONS:  new pain. tender; R07.9-Chest pain, unspecified; J81.0-Acute pulmonary  edema; R06.00-Dyspnea, unspecified; D72.829-Elevated white blood cell  count, unspecified; C92.10-Chronic myeloid leukemia, BCR/ABL-positive,  not having achieved remission    COMPARISON:  No Comparisons Available    TECHNIQUE:  2 views of the left tibia and fibula was/were obtained.    FINDINGS:  No left tibia or fibular fracture is seen. The knee and ankle joints  appear appropriately aligned, without appreciable osteoarthritic change.  Overlying soft tissues  are normal. No osteolytic or osteoblastic  abnormality.    Impression  Negative tibial fibula radiograph.    Electronically Signed By-Heather Kelsey MD On:11/14/2022 3:47 PM  This report was finalized on [id number] by  Heather Kelsey MD.      XR Femur 2 View Left    Narrative  DATE OF EXAM:  11/14/2022 2:59 PM    PROCEDURE:  XR FEMUR 2 VW LEFT-    INDICATIONS:  new onset pain w tenderness; R07.9-Chest pain, unspecified; J81.0-Acute  pulmonary edema; R06.00-Dyspnea, unspecified; D72.829-Elevated white  blood cell count, unspecified; C92.10-Chronic myeloid leukemia,  BCR/ABL-positive, not having achieved remission    COMPARISON:  AP pelvis with left hip radiographs 6/30/2022    TECHNIQUE:  2 views of the left femur was/were obtained.    FINDINGS:  No left femur fracture is seen. Hip and knee joints appear appropriately  aligned. No definite joint effusion. No osteolytic or osteoblastic  abnormality. There may be mild narrowing of the left hip joint space.  Tubal ligation clip is seen in the pelvis on the left. The overlying  left thigh soft tissues appear unremarkable.    Impression  No acute abnormality of the left femur. No suspicious osseous  abnormality is identified.    Electronically Signed By-Heather Kelsey MD On:11/14/2022 3:47 PM  This report was finalized on [id number] by  Heather Kelsey MD.      XR Chest 1 View    Narrative  DATE OF EXAM:  11/3/2022 10:16 AM    PROCEDURE:  XR CHEST 1 VW-    INDICATIONS:  Chest Pain Protocol    COMPARISON:  10/31/2022 and prior    TECHNIQUE:  Portable Chest    FINDINGS:    Study is limited by overlying support apparatus. Lung volumes are low.  There is cardiomegaly which is significant change when accounting for  differences in inspiratory volume. Mild pulmonary vascular congestion is  noted. Increased hazy and interstitial opacities are noted diffusely  which are accentuated by low lung volumes. No obvious pleural effusion  noted. Osseous structures are  unremarkable    Impression  IMPRESSION :  Cardiomegaly and mild pulmonary vascular congestion which is increase in  the comparison. This may be in part artifactual from low lung volumes  versus underlying pulmonary edema or pneumonia in the correct clinical  setting[    Electronically Signed By-Freddy Toribio On:11/3/2022 10:26 AM  This report was finalized on [id number] by  Freddy Toribio, .      Results for orders placed during the hospital encounter of 03/11/21    Duplex Venous Lower Extremity - Left    Interpretation Summary  · Normal left lower extremity venous duplex scan.        ASSESSMENT / PLAN      Chest pain, unspecified type    CML (chronic myelocytic leukemia) (HCC)    Depression with anxiety    History of pulmonary embolism    Medically noncompliant    Type 2 diabetes mellitus with diabetic polyneuropathy, with long-term current use of insulin (HCC)    Acute diastolic CHF (congestive heart failure) (HCC)    NICM (nonischemic cardiomyopathy) (HCC)    Bilateral lower extremity edema    • ANAYA--likely pre-renal and or ATN in setting of hypotension/ARBs/diuretics. hold entresto, diuretics. UA neg. Uric acid 9.5  • Hyperkalemia--due to ANAYA/entresto. Hold for now  • DM2  • CML--on chemo  • Chronic diastolic heart failure--compensated. Given rising CR will hold diuretics for now  • TLS--high tumor burden. Hydrate    Creatinine better, uric acid down to 6.5  Continue IV fluids at 75 mill per hour  Continue allopurinol for tumor lysis syndrome.  Monitor renal function fluid status of juanis Andre MD    11/15/22  06:57 EST

## 2022-11-15 NOTE — PLAN OF CARE
Goal Outcome Evaluation:  Plan of Care Reviewed With: patient           Outcome Evaluation: Pt has slept on and off throughout the night, pt c/o of LLE pain gave PRN pain medication as requested. IVF currently infusing at this time. Cardio, Nephro, and Heme/Onc currently following, will continue to monitor.

## 2022-11-15 NOTE — PROGRESS NOTES
[facility] Daily Progress Note    Patient Name: Nieves Mc  : 1975  MRN: [id number]  Primary Care Physician:  Alida Latham APRN  Date of admission: 11/3/2022      Subjective          Brief interim history: 47-year-old female with nonischemic cardiomyopathy, HFpEF, PE, anxiety, T2DM, history of medical noncompliance, depression/anxiety and CML. She was admitted on 11/3/2022[facility] complaining of chest pain that started at around 9 AM while she was at home with her .  Chest pain is described as dull in nature, radiating to the left with paresthesia.  Cardiac biomarkers with troponin negative x3 and EKG shows sinus tachycardia.  Laboratories notable for proBNP of 39486 and chest x-ray showed cardiomegaly. Patient is admitted with atypical chest pain chest pain, rule out for ACS.  Seen in consultation by cardiologist with recommendation and followed by oncologist for CML.     2022: Seen and examined in follow evaluation.  Chest pain-free and resting comfortably in bed.     2022: Seen and examined in follow-up.  Feels and looks better this morning.  No complaints or event.    2022: Seen and examined in follow-up.  Feels slightly better but still dyspneic with activity.  Underwent NST today with result pending      2022  Patient seen and examined  Reported feeling generally weak otherwise no new complaints  Initial plan was to discharge patient today however cardiologist wants to keep patient as she still has bilateral pedal edema  Furosemide frequency was increased and metolazone added    2022  Patient seen and examined  Continues with generalized body weakness  Pedal edema with no significant change despite increased diuretic frequency.  We will continue to monitor    2022  Patient seen and examined  Has no new complaint  Furosemide was increased to 40 mg twice daily, metolazone and Entresto added by  cardiology.  Patient with acute renal injury-most likely due to diuretics  Also noted to have hyperkalemia-due to acute kidney injury and Entresto use  Was given Lokelma  With decreased diuretic to daily and hold metolazone    11/10/2022  Patient seen and examined  No new complaint  Bilateral lower extremity swelling improving   Still on creatinine now stabilized  Change furosemide to 40 mg daily  BMP in the a.m.    11/11/2022  Patient seen and examined  Bilateral lower extremity swelling has improved  Serum creatinine worsening  Furosemide and Entresto discontinued  Nephrologist consulted  BMP in the a.m.    11/12/2022  Patient seen and examined  No new complaint    11/13/2022  Patient seen and examined  Complaining of pain on the right breast    11/14  Seen and examined vitals and labs reviewed  Discussed with nursing staff  Severe pain left thigh and leg.  No signs of vascular compromise or swelling or infection noted  X-rays ordered    11/15  Seen and examined  Still w pain lt Le  V doppleerr    Review of systems  All other system reviewed and negative except as above      Objective      Vitals:   Temp:  [97.1 °F (36.2 °C)-98.5 °F (36.9 °C)] 97.1 °F (36.2 °C)  Heart Rate:  [101-112] 101  Resp:  [18-20] 18  BP: (107-134)/(63-76) 108/67    Physical Exam  Vitals reviewed.   Constitutional:       Comments: Chronically ill looking   HENT:      Head: Normocephalic.      Nose: Nose normal.      Mouth/Throat:      Mouth: Mucous membranes are moist.   Eyes:      Extraocular Movements: Extraocular movements intact.      Conjunctiva/sclera: Conjunctivae normal.      Pupils: Pupils are equal, round, and reactive to light.   Cardiovascular:      Rate and Rhythm: Normal rate and regular rhythm.   Pulmonary:      Effort: No respiratory distress.   Abdominal:      General: Bowel sounds are normal.      Palpations: Abdomen is soft.      Tenderness: There is no abdominal tenderness.   Musculoskeletal:         General: Normal  range of motion.      Cervical back: Neck supple.   Skin:     General: Skin is warm.   Neurological:      Mental Status: She is alert and oriented to person, place, and time.   Psychiatric:         Mood and Affect: Mood normal.          Result Review    Result Review:  I have personally reviewed the results from the time of this admission to 11/15/2022 12:57 EST and agree with these findings:  [x]  Laboratory  []  Microbiology  [x]  Radiology  [x]  EKG/Telemetry   [x]  Cardiology/Vascular   []  Pathology  []  Old records  []  Other:  Most notable findings include:       Assessment & Plan        albuterol, 10 mg, Nebulization, Once  allopurinol, 100 mg, Oral, Q8H  aspirin, 325 mg, Oral, Daily  budesonide-formoterol, 2 puff, Inhalation, BID - RT  carvedilol, 6.25 mg, Oral, BID With Meals  citalopram, 10 mg, Oral, Daily  enoxaparin, 70 mg, Subcutaneous, Q12H  fentaNYL, 1 patch, Transdermal, Q72H  First Mouthwash (Magic Mouthwash), 5 mL, Swish & Spit, Q4H  folic acid, 1 mg, Oral, Daily  guaiFENesin, 600 mg, Oral, Q12H  imatinib, 400 mg, Oral, Daily   And  imatinib, 200 mg, Oral, Daily  insulin glargine, 30 Units, Subcutaneous, Daily  insulin lispro, 10 Units, Subcutaneous, TID AC  insulin lispro, 2-9 Units, Subcutaneous, TID With Meals  mirtazapine, 30 mg, Oral, Nightly  sodium chloride, 250 mL, Intravenous, Once  sodium chloride, 10 mL, Intravenous, Q12H       Pharmacy to Dose enoxaparin (LOVENOX),   sodium chloride, 75 mL/hr, Last Rate: 75 mL/hr (11/15/22 0325)         Active Hospital Problems:  Active Hospital Problems    Diagnosis    • **Chest pain, unspecified type    • Bilateral lower extremity edema    • Acute diastolic CHF (congestive heart failure) ([facility])    • NICM (nonischemic cardiomyopathy) ([facility])    • Type 2 diabetes mellitus with diabetic polyneuropathy, with long-term current use of insulin ([facility])    • Depression with anxiety    • Medically noncompliant    • History of pulmonary embolism    • CML (chronic  myelocytic leukemia) (HCC)      Assessment/plan:      Chest pain (atypical)  MI ruled out with cardiac enzymes and EKG  Had stress test 11/6/2022-negative for ischemia  Was seen by cardiologist who recommended no further work-up  Supportive care     Acute on chronic systolic/ diastolic heart failure  Nonischemic cardiomyopathy  EF 45%  On low-dose beta-blocker, losartan and diuretic  Still with pedal edema and shortness of breath with exertion  Defer diuretics and fluids to nephrologist/cardiologist  Received Lokelma for hyperkalemia      Acute kidney injury  Possible tumor lysis syndrome  Serum creatinine continues to worsening despite discontinuing diuretic and Entresto  Nephrologist following  Uric acid 9.5  ?  Volume depletion and underlying tumor lysis syndrome  Started on IV hydration  Also started on allopurinol      Hyperkalemia-  Most likely due to acute kidney injury/Entresto use  Improved with  hyperkalemia protocol    11/14Severe pain left thigh and leg.  No signs of vascular compromise or swelling or infection noted  X-rays neg femur and tib-fib  V doppler --Chronic right lower extremity superficial thrombophlebitis noted in the small saphenous         History of pulmonary embolus  Currently on prophylactic Lovenox  Hematologist following  On Xarelto at home>. lovenox full dose for now    Diabetes mellitus type 2  Continue on insulin regimen  Monitor blood sugar and adjust insulin as needed        CML not in remission  Elevated alkaline phosphatase  Hyperleukocytosis  On Gleevec/hydroxyurea  Oncologist following  Poor compliance with medications noted    Anxiety/depression  On Remeron/Celexa  Was seen by psychiatrist while inpatient     Generalized weakness  Due to underlying comorbidities  Was seen by physical therapy/Occupational Therapy-no skilled therapy needed         DVT prophylaxis:lovenox  Medical DVT prophylaxis orders are present.    CODE STATUS:    Level Of Support Discussed With:  Patient  Code Status (Patient has no pulse and is not breathing): CPR (Attempt to Resuscitate)  Medical Interventions (Patient has pulse or is breathing): Full Support      Disposition:  I expect patient to be discharged     Electronically signed by George Singh MD, 11/15/22, 12:57 EST.  [facility]ist [facility]

## 2022-11-15 NOTE — PROGRESS NOTES
Hematology/Oncology Inpatient Progress Note    PATIENT NAME: Nieves Mc  : 1975  MRN: [id number]    CHIEF COMPLAINT: Chest pain    HISTORY OF PRESENT ILLNESS:      Nieves Mc is a 47 y.o. female who presented to [facility] on 11/3/2022 with complaints of chest pain.  Past medical history significant for CML, depression with anxiety, PE, type 2 diabetes mellitus.  Patient reported that around 9 AM the day of presentation she began to have chest pain.  Stated it was accompanied by the loss of hearing in her left ear and left arm numbness.  She reported she has never experienced this previous.  Since being in the emergency department the chest pain has subsided.  She describes the chest pain as dull.  She did not take anything at home to try to alleviate it.  She reported chronic leg swelling but denied any recent weight gain.  She reported shortness of breath.  She reported chronic cough.  She denied any fever or chills.  She also complained of nausea, vomiting and diarrhea. She was most recently seen in the emergency department on 10/31/2022 for abdominal pain at the instruction of her oncologist.  A CT of the abdomen and pelvis was fairly unchanged from prior and showed severe generalized anasarca and severe hepatosplenomegaly.  A CT of the chest was performed to rule out pulmonary embolism which was also negative and therefore the patient was discharged home.     Evaluation in the ED showed a negative troponin less than 0.010, elevated proBNP at 29,519, glucose 411, normal kidney function, elevated alk phos 1826.  WBC elevated at 376.70, hemoglobin 7.8, MCV 82.8, platelets 404,000, ANC 48.97, blasts 66%.  EKG showed sinus tachycardia, chest x-ray showed cardiomegaly and mild pulmonary vascular congestion which is increased in comparison.  The patient received Lasix, regular insulin, Nitrostat and a normal saline 250 mL bolus in the ED.  She was admitted to the hospital for further  evaluation and treatment.     11/03/22  Hematology/Oncology was consulted on a patient known to us and followed in the office by Dr. Lerma for her diagnosis of CML currently on imatinib and hydroxyurea.  Patient was initially seen in consultation in October 2018 while she was inpatient at [facility] with CML.  At that time she was under the care of Dr. Roach and  and was managed on imatinib.  Patient had a repeat bone marrow aspiration and biopsy in December 2018 that was consistent with chronic myeloid leukemia.  BCR ABL positive, chronic phase.  ABL kinase mutational analysis negative.  Patient was initiated on Tasigna in February 2019 but this was discontinued in June 2022 due to diagnosis of cardiomyopathy.  At that time it was determined that the imatinib would be the safer treatment option and she was reinitiated on this.  Her course has been complicated due to noncompliance and difficulty tolerating TKI's.    Flow Cytometry 11/3/22 - increased neutrophilic cells, increased atypical myeloid blasts (13% of leukocytes), aberrant CD56 expression on granulocytes and monocytes, increased basophils (7% of leukocytes)     11/4/22: Imatinib 400 mg daily restarted     11/5/22: Continues Hydrea 2500 mg daily, Imatinib 400 mg daily, allopurinol 100 mg daily. Patient denies chest pain or SOB. Discussed with patient that WBC is 284.9 today. Patient relays that she started her home supply of imatinib yesterday at 400 mg.      She  has a past medical history of Bone pain, COVID-19 virus infection (01/12/2022), Diabetes mellitus (HCC), Extremity pain, Leg pain, Migraine, Pulmonary embolism (HCC), and Vision loss.     PCP: Alida Latham APRN    History of present illness was reviewed and is unchanged from the previous visit. 11/05/22      Subjective      Patient complaining of left leg pain          MEDICATIONS:    Scheduled Meds:  albuterol, 10 mg, Nebulization, Once  allopurinol, 100 mg, Oral, Q8H  aspirin, 325 mg,  "Oral, Daily  budesonide-formoterol, 2 puff, Inhalation, BID - RT  carvedilol, 6.25 mg, Oral, BID With Meals  fentaNYL, 1 patch, Transdermal, Q72H   And  [START ON 11/17/2022] Check Fentanyl Patch Placement, 1 each, Does not apply, Q12H  citalopram, 10 mg, Oral, Daily  enoxaparin, 70 mg, Subcutaneous, Q12H  First Mouthwash (Magic Mouthwash), 5 mL, Swish & Spit, Q4H  folic acid, 1 mg, Oral, Daily  guaiFENesin, 600 mg, Oral, Q12H  imatinib, 400 mg, Oral, Daily   And  imatinib, 200 mg, Oral, Daily  insulin glargine, 30 Units, Subcutaneous, Daily  insulin lispro, 10 Units, Subcutaneous, TID AC  insulin lispro, 2-9 Units, Subcutaneous, TID With Meals  mirtazapine, 30 mg, Oral, Nightly  sodium chloride, 250 mL, Intravenous, Once  sodium chloride, 10 mL, Intravenous, Q12H       Continuous Infusions:  Pharmacy to Dose enoxaparin (LOVENOX),   sodium chloride, 75 mL/hr, Last Rate: 75 mL/hr (11/16/22 0539)       PRN Meds:  •  acetaminophen **OR** acetaminophen **OR** acetaminophen  •  ALPRAZolam  •  aluminum-magnesium hydroxide-simethicone  •  dextrose  •  dextrose  •  glucagon (human recombinant)  •  HYDROcodone-acetaminophen  •  HYDROmorphone  •  influenza vaccine  •  melatonin  •  nitroglycerin  •  ondansetron **OR** ondansetron  •  Pharmacy to Dose enoxaparin (LOVENOX)  •  prochlorperazine  •  sodium chloride  •  sodium chloride     ALLERGIES:    Allergies   Allergen Reactions   • Hydromorphone GI Intolerance     dilaudid   • Morphine Nausea And Vomiting   • Propofol Hives     Previously tolerated being premedicated with diphenhydramine and famotadine       Objective    VITALS:   /70 (Patient Position: Lying)   Pulse 99   Temp 97.8 °F (36.6 °C) (Oral)   Resp 17   Ht 162.6 cm (64\")   Wt 68.5 kg (151 lb)   LMP 05/25/2022 (Approximate)   SpO2 98%   BMI 25.92 kg/m²     PHYSICAL EXAM:     Physical Exam  Constitutional:       Appearance: Normal appearance. She is ill-appearing.      Comments: Frail, temporal muscle " wasting   HENT:      Head: Normocephalic and atraumatic.      Mouth/Throat:      Mouth: Mucous membranes are moist.   Eyes:      Pupils: Pupils are equal, round, and reactive to light.   Cardiovascular:      Rate and Rhythm: Normal rate and regular rhythm.      Pulses: Normal pulses.      Heart sounds: No murmur heard.  Pulmonary:      Effort: Pulmonary effort is normal.      Breath sounds: Normal breath sounds.   Abdominal:      General: There is no distension.      Palpations: Abdomen is soft. There is no mass.      Tenderness: There is no abdominal tenderness.   Musculoskeletal:         General: Normal range of motion.      Cervical back: Normal range of motion and neck supple.   Skin:     General: Skin is warm.   Neurological:      General: No focal deficit present.      Mental Status: She is alert.   Psychiatric:         Mood and Affect: Mood normal.     I have reexamined the patient and the results are consistent with the previously documented exam. Meghann Lerma MD     RECENT LABS:    Lab Results (last 24 hours)     Procedure Component Value Units Date/Time    POC Glucose Once [[id number]]  (Abnormal) Collected: 11/16/22 1145    Specimen: Blood Updated: 11/16/22 1146     Glucose 135 mg/dL      Comment: Serial Number: [id number]:  [id number]       POC Glucose Once [[id number]]  (Abnormal) Collected: 11/16/22 0737    Specimen: Blood Updated: 11/16/22 0738     Glucose 111 mg/dL      Comment: Serial Number: [id number]:  [id number]       Manual Differential [[id number]]  (Abnormal) Collected: 11/16/22 0320    Specimen: Blood Updated: 11/16/22 0627     Neutrophil % 46.0 %      Lymphocyte % 3.0 %      Monocyte % 2.0 %      Eosinophil % 5.0 %      Basophil % 9.0 %      Bands %  13.0 %      Metamyelocyte % 11.0 %      Myelocyte % 2.0 %      Promyelocyte % 2.0 %      Blasts % 7.0 %      Neutrophils Absolute 139.89 10*3/mm3      Lymphocytes Absolute 7.11 10*3/mm3      Monocytes Absolute 4.74 10*3/mm3       Eosinophils Absolute 11.86 10*3/mm3      Basophils Absolute 21.34 10*3/mm3      Anisocytosis Slight/1+     Microcytes Slight/1+     Poikilocytes Slight/1+     Polychromasia Slight/1+     Rouleaux Slight/1+     WBC Morphology Normal     Giant Platelets Slight/1+    Narrative:      Reviewed by Pathologist within the past 30 days on [id number]    CBC & Differential [[id number]]  (Abnormal) Collected: 11/16/22 0320    Specimen: Blood Updated: 11/16/22 0627    Narrative:      The following orders were created for panel order CBC & Differential.  Procedure                               Abnormality         Status                     ---------                               -----------         ------                     CBC Auto Differential[[id number]]        Abnormal            Final result               Scan Slide[[id number]]                                       Final result                 Please view results for these tests on the individual orders.    Scan Slide [[id number]] Collected: 11/16/22 0320    Specimen: Blood Updated: 11/16/22 0627     Scan Slide --     Comment: See Manual Differential Results       CBC Auto Differential [[id number]]  (Abnormal) Collected: 11/16/22 0320    Specimen: Blood Updated: 11/16/22 0627     .10 10*3/mm3      RBC 3.48 10*6/mm3      Hemoglobin 7.9 g/dL      Hematocrit 28.0 %      MCV 80.4 fL      MCH 22.8 pg      MCHC 28.4 g/dL      RDW 20.3 %      RDW-SD 55.6 fl      MPV 8.6 fL      Platelets 321 10*3/mm3     Narrative:      Modified report. Previous result was Hemogram on 11/16/2022 at 0408 EST.  The previously reported component NRBC is no longer being reported. Previous result was 0.0 /100 WBC (Reference Range: 0.0-0.2 /100 WBC) on 11/16/2022 at 0408 EST.    Comprehensive Metabolic Panel [[id number]]  (Abnormal) Collected: 11/16/22 0320    Specimen: Blood Updated: 11/16/22 0455     Glucose 86 mg/dL      BUN 24 mg/dL      Creatinine 0.69 mg/dL      Sodium 137 mmol/L      Potassium  5.1 mmol/L      Chloride 109 mmol/L      CO2 17.0 mmol/L      Calcium 8.8 mg/dL      Total Protein 6.7 g/dL      Albumin 3.50 g/dL      ALT (SGPT) 74 U/L      AST (SGOT) 82 U/L      Alkaline Phosphatase 1,356 U/L      Total Bilirubin 1.0 mg/dL      Globulin 3.2 gm/dL      A/G Ratio 1.1 g/dL      BUN/Creatinine Ratio 34.8     Anion Gap 11.0 mmol/L      eGFR 107.9 mL/min/1.73      Comment: National Kidney Foundation and American Society of Nephrology (ASN) Task Force recommended calculation based on the Chronic Kidney Disease Epidemiology Collaboration (CKD-EPI) equation refit without adjustment for race.       Narrative:      GFR Normal >60  Chronic Kidney Disease <60  Kidney Failure <15      Phosphorus [[id number]]  (Abnormal) Collected: 11/16/22 0320    Specimen: Blood Updated: 11/16/22 0443     Phosphorus 4.9 mg/dL     Uric Acid [[id number]]  (Abnormal) Collected: 11/16/22 0320    Specimen: Blood Updated: 11/16/22 0443     Uric Acid 6.4 mg/dL           PENDING RESULTS: Flow cytometry    IMAGING REVIEWED:  MRI Lumbar Spine Without Contrast    Result Date: 11/16/2022  1. The study is limited. The patient vomited during the examination and could not continue. The limited images demonstrate no significant disc bulge, canal or foraminal stenosis. Mild L5-S1 facet arthropathy is noted. 2. Localizer images demonstrate the presence of massive splenomegaly and mild hepatomegaly.  Electronically Signed By-Heather Kelsey MD On:11/16/2022 1:58 PM This report was finalized on [id number] by  Heather Kesley MD.      Assessment & Plan      ASSESSMENT:    Hematology/Oncology was consulted on a patient known to us and followed in the office by Dr. Lerma for her diagnosis of CML currently on imatinib and hydroxyurea.  Patient has been very noncompliant with treatments.  She is currently on Gleevec 400 mg daily but this was recently increased to 600 mg daily.  She is also supposed to be on hydroxyurea twice a day.  She has been  noncompliant with medication intake and also routine follow-up in the office for ongoing care.  Patient has had progressive shortness of breath and swelling on her lower extremities.    1. CML not in remission: Currently on imatinib 600 mg daily.  Hydroxyurea is currently on hold. Patient has been noncompliant with her medicines in the past.  Continue Gleevec 600 mg p.o. daily. Improving WBC to 225  2. Oral lesion likely leukemic cells improving on Gleevec continue the same.  Reviewed  3. Acute kidney injury: Nephrologist on board.  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  On allopurinol creatinine improving.  Improved  4. Left lower extremity discomfort: We will order Doppler rule out clot  5. Hyperkalemia: likely secondary to entresto/ANAYA/TLS, now normalized  6. Anemia: Secondary to CML and TKI, hydroxyurea.    7. Hyperleukocytosis: Secondary to CML, beginning to improve on Gleevec  8. History of pulmonary embolism: On Xarelto as an outpatient.  Lovenox currently.  9. Acute on chronic diastolic heart failure: Management per primary team and cardiology.  10. Multiple chronic conditions: Type 2 diabetes mellitus, hypertension, depression with anxiety.  11. Chest wall pain: unclear etiology, CT chest non contrast ordered.     PLANS:    1. Continue Daily CBCs  2. Check mag level, phosphorus and daily uric acid  3. Continue Gleevec  4. Doppler of bilateral lower extremity rule out clot today  5. Reviewed results of flow cytometry  6. She has been seen by psychiatrist and currently on Celexa 10 mg daily.  Remeron increased to 30 mg at night.  Outpatient counseling has been recommended  7. Continue Allopurinol, uric acid now improved to 8.4.      Electronically signed by Meghann Lerma MD, 11/16/22, 4:21 PM EST.

## 2022-11-16 ENCOUNTER — APPOINTMENT (OUTPATIENT)
Dept: CARDIOLOGY | Facility: HOSPITAL | Age: 47
End: 2022-11-16

## 2022-11-16 ENCOUNTER — APPOINTMENT (OUTPATIENT)
Dept: MRI IMAGING | Facility: HOSPITAL | Age: 47
End: 2022-11-16

## 2022-11-16 VITALS
OXYGEN SATURATION: 98 % | WEIGHT: 151 LBS | SYSTOLIC BLOOD PRESSURE: 119 MMHG | TEMPERATURE: 98.6 F | BODY MASS INDEX: 25.78 KG/M2 | RESPIRATION RATE: 16 BRPM | DIASTOLIC BLOOD PRESSURE: 79 MMHG | HEART RATE: 98 BPM | HEIGHT: 64 IN

## 2022-11-16 LAB
ALBUMIN SERPL-MCNC: 3.5 G/DL (ref 3.5–5.2)
ALBUMIN/GLOB SERPL: 1.1 G/DL
ALP SERPL-CCNC: 1356 U/L (ref 39–117)
ALT SERPL W P-5'-P-CCNC: 74 U/L (ref 1–33)
ANION GAP SERPL CALCULATED.3IONS-SCNC: 11 MMOL/L (ref 5–15)
ANISOCYTOSIS BLD QL: ABNORMAL
AST SERPL-CCNC: 82 U/L (ref 1–32)
BASOPHILS # BLD MANUAL: 21.34 10*3/MM3 (ref 0–0.2)
BASOPHILS NFR BLD MANUAL: 9 % (ref 0–1.5)
BH CV ECHO MEAS - EDV(MOD-SP2): 136 ML
BH CV ECHO MEAS - EDV(MOD-SP4): 79.8 ML
BH CV ECHO MEAS - EF(MOD-BP): 39 %
BH CV ECHO MEAS - EF(MOD-SP2): 45.2 %
BH CV ECHO MEAS - EF(MOD-SP4): 38.6 %
BH CV ECHO MEAS - ESV(MOD-SP2): 74.5 ML
BH CV ECHO MEAS - ESV(MOD-SP4): 49 ML
BH CV ECHO MEAS - LV DIASTOLIC VOL/BSA (35-75): 46 CM2
BH CV ECHO MEAS - LV SYSTOLIC VOL/BSA (12-30): 28.2 CM2
BH CV ECHO MEAS - SI(MOD-SP2): 35.4 ML/M2
BH CV ECHO MEAS - SI(MOD-SP4): 17.7 ML/M2
BH CV ECHO MEAS - SV(MOD-SP2): 61.5 ML
BH CV ECHO MEAS - SV(MOD-SP4): 30.8 ML
BILIRUB SERPL-MCNC: 1 MG/DL (ref 0–1.2)
BLASTS NFR BLD MANUAL: 7 % (ref 0–0)
BUN SERPL-MCNC: 24 MG/DL (ref 6–20)
BUN/CREAT SERPL: 34.8 (ref 7–25)
CALCIUM SPEC-SCNC: 8.8 MG/DL (ref 8.6–10.5)
CHLORIDE SERPL-SCNC: 109 MMOL/L (ref 98–107)
CO2 SERPL-SCNC: 17 MMOL/L (ref 22–29)
CREAT SERPL-MCNC: 0.69 MG/DL (ref 0.57–1)
DEPRECATED RDW RBC AUTO: 55.6 FL (ref 37–54)
EGFRCR SERPLBLD CKD-EPI 2021: 107.9 ML/MIN/1.73
EOSINOPHIL # BLD MANUAL: 11.86 10*3/MM3 (ref 0–0.4)
EOSINOPHIL NFR BLD MANUAL: 5 % (ref 0.3–6.2)
ERYTHROCYTE [DISTWIDTH] IN BLOOD BY AUTOMATED COUNT: 20.3 % (ref 12.3–15.4)
GIANT PLATELETS: ABNORMAL
GLOBULIN UR ELPH-MCNC: 3.2 GM/DL
GLUCOSE BLDC GLUCOMTR-MCNC: 111 MG/DL (ref 70–105)
GLUCOSE BLDC GLUCOMTR-MCNC: 135 MG/DL (ref 70–105)
GLUCOSE BLDC GLUCOMTR-MCNC: 161 MG/DL (ref 70–105)
GLUCOSE BLDC GLUCOMTR-MCNC: 62 MG/DL (ref 70–105)
GLUCOSE BLDC GLUCOMTR-MCNC: 98 MG/DL (ref 70–105)
GLUCOSE SERPL-MCNC: 86 MG/DL (ref 65–99)
HCT VFR BLD AUTO: 28 % (ref 34–46.6)
HGB BLD-MCNC: 7.9 G/DL (ref 12–15.9)
LYMPHOCYTES # BLD MANUAL: 7.11 10*3/MM3 (ref 0.7–3.1)
LYMPHOCYTES NFR BLD MANUAL: 2 % (ref 5–12)
MAXIMAL PREDICTED HEART RATE: 173 BPM
MCH RBC QN AUTO: 22.8 PG (ref 26.6–33)
MCHC RBC AUTO-ENTMCNC: 28.4 G/DL (ref 31.5–35.7)
MCV RBC AUTO: 80.4 FL (ref 79–97)
METAMYELOCYTES NFR BLD MANUAL: 11 % (ref 0–0)
MICROCYTES BLD QL: ABNORMAL
MONOCYTES # BLD: 4.74 10*3/MM3 (ref 0.1–0.9)
MYELOCYTES NFR BLD MANUAL: 2 % (ref 0–0)
NEUTROPHILS # BLD AUTO: 139.89 10*3/MM3 (ref 1.7–7)
NEUTROPHILS NFR BLD MANUAL: 46 % (ref 42.7–76)
NEUTS BAND NFR BLD MANUAL: 13 % (ref 0–5)
PHOSPHATE SERPL-MCNC: 4.9 MG/DL (ref 2.5–4.5)
PLATELET # BLD AUTO: 321 10*3/MM3 (ref 140–450)
PMV BLD AUTO: 8.6 FL (ref 6–12)
POIKILOCYTOSIS BLD QL SMEAR: ABNORMAL
POLYCHROMASIA BLD QL SMEAR: ABNORMAL
POTASSIUM SERPL-SCNC: 5.1 MMOL/L (ref 3.5–5.2)
PROMYELOCYTES NFR BLD MANUAL: 2 % (ref 0–0)
PROT SERPL-MCNC: 6.7 G/DL (ref 6–8.5)
RBC # BLD AUTO: 3.48 10*6/MM3 (ref 3.77–5.28)
ROULEAUX BLD QL SMEAR: ABNORMAL
SCAN SLIDE: NORMAL
SODIUM SERPL-SCNC: 137 MMOL/L (ref 136–145)
STRESS TARGET HR: 147 BPM
URATE SERPL-MCNC: 6.4 MG/DL (ref 2.4–5.7)
VARIANT LYMPHS NFR BLD MANUAL: 3 % (ref 19.6–45.3)
WBC MORPH BLD: NORMAL
WBC NRBC COR # BLD: 237.1 10*3/MM3 (ref 3.4–10.8)

## 2022-11-16 PROCEDURE — 72148 MRI LUMBAR SPINE W/O DYE: CPT

## 2022-11-16 PROCEDURE — 99233 SBSQ HOSP IP/OBS HIGH 50: CPT | Performed by: INTERNAL MEDICINE

## 2022-11-16 PROCEDURE — 84550 ASSAY OF BLOOD/URIC ACID: CPT | Performed by: INTERNAL MEDICINE

## 2022-11-16 PROCEDURE — 63710000001 INSULIN GLARGINE PER 5 UNITS: Performed by: NURSE PRACTITIONER

## 2022-11-16 PROCEDURE — 25010000002 PROCHLORPERAZINE 10 MG/2ML SOLUTION: Performed by: INTERNAL MEDICINE

## 2022-11-16 PROCEDURE — 80053 COMPREHEN METABOLIC PANEL: CPT | Performed by: INTERNAL MEDICINE

## 2022-11-16 PROCEDURE — 25010000002 ONDANSETRON PER 1 MG: Performed by: NURSE PRACTITIONER

## 2022-11-16 PROCEDURE — 93308 TTE F-UP OR LMTD: CPT

## 2022-11-16 PROCEDURE — 99232 SBSQ HOSP IP/OBS MODERATE 35: CPT | Performed by: INTERNAL MEDICINE

## 2022-11-16 PROCEDURE — 94761 N-INVAS EAR/PLS OXIMETRY MLT: CPT

## 2022-11-16 PROCEDURE — 93308 TTE F-UP OR LMTD: CPT | Performed by: INTERNAL MEDICINE

## 2022-11-16 PROCEDURE — 94799 UNLISTED PULMONARY SVC/PX: CPT

## 2022-11-16 PROCEDURE — 25010000002 MORPHINE PER 10 MG: Performed by: INTERNAL MEDICINE

## 2022-11-16 PROCEDURE — 85007 BL SMEAR W/DIFF WBC COUNT: CPT | Performed by: INTERNAL MEDICINE

## 2022-11-16 PROCEDURE — 94760 N-INVAS EAR/PLS OXIMETRY 1: CPT

## 2022-11-16 PROCEDURE — 25010000002 HYDROMORPHONE 1 MG/ML SOLUTION: Performed by: INTERNAL MEDICINE

## 2022-11-16 PROCEDURE — 25010000002 ENOXAPARIN PER 10 MG: Performed by: INTERNAL MEDICINE

## 2022-11-16 PROCEDURE — 85025 COMPLETE CBC W/AUTO DIFF WBC: CPT | Performed by: INTERNAL MEDICINE

## 2022-11-16 PROCEDURE — 94664 DEMO&/EVAL PT USE INHALER: CPT

## 2022-11-16 PROCEDURE — 82962 GLUCOSE BLOOD TEST: CPT

## 2022-11-16 PROCEDURE — 84100 ASSAY OF PHOSPHORUS: CPT | Performed by: INTERNAL MEDICINE

## 2022-11-16 RX ORDER — HYDROCODONE BITARTRATE AND ACETAMINOPHEN 10; 325 MG/1; MG/1
1 TABLET ORAL EVERY 4 HOURS PRN
Status: DISCONTINUED | OUTPATIENT
Start: 2022-11-16 | End: 2022-11-17 | Stop reason: HOSPADM

## 2022-11-16 RX ORDER — PROCHLORPERAZINE EDISYLATE 5 MG/ML
5 INJECTION INTRAMUSCULAR; INTRAVENOUS EVERY 6 HOURS PRN
Status: DISCONTINUED | OUTPATIENT
Start: 2022-11-16 | End: 2022-11-17 | Stop reason: HOSPADM

## 2022-11-16 RX ORDER — SCOLOPAMINE TRANSDERMAL SYSTEM 1 MG/1
1 PATCH, EXTENDED RELEASE TRANSDERMAL
Status: DISCONTINUED | OUTPATIENT
Start: 2022-11-16 | End: 2022-11-17 | Stop reason: HOSPADM

## 2022-11-16 RX ORDER — IMATINIB MESYLATE 400 MG/1
400 TABLET, FILM COATED ORAL DAILY
Qty: 30 TABLET | Refills: 2
Start: 2022-11-17 | End: 2023-01-09 | Stop reason: SDUPTHER

## 2022-11-16 RX ORDER — MIRTAZAPINE 15 MG/1
30 TABLET, ORALLY DISINTEGRATING ORAL NIGHTLY
Status: DISCONTINUED | OUTPATIENT
Start: 2022-11-16 | End: 2022-11-17 | Stop reason: HOSPADM

## 2022-11-16 RX ORDER — FENTANYL 50 UG/H
1 PATCH TRANSDERMAL
Status: DISCONTINUED | OUTPATIENT
Start: 2022-11-16 | End: 2022-11-17 | Stop reason: HOSPADM

## 2022-11-16 RX ORDER — IMATINIB MESYLATE 100 MG/1
200 TABLET, FILM COATED ORAL DAILY
Qty: 60 TABLET | Refills: 2
Start: 2022-11-17 | End: 2023-01-09 | Stop reason: SDUPTHER

## 2022-11-16 RX ADMIN — HYDROCODONE BITARTRATE AND ACETAMINOPHEN 1 TABLET: 10; 325 TABLET ORAL at 16:48

## 2022-11-16 RX ADMIN — ONDANSETRON 4 MG: 2 INJECTION INTRAMUSCULAR; INTRAVENOUS at 17:00

## 2022-11-16 RX ADMIN — FENTANYL 1 PATCH: 50 PATCH, EXTENDED RELEASE TRANSDERMAL at 09:49

## 2022-11-16 RX ADMIN — HYDROCODONE BITARTRATE AND ACETAMINOPHEN 1 TABLET: 7.5; 325 TABLET ORAL at 09:16

## 2022-11-16 RX ADMIN — CARVEDILOL 6.25 MG: 6.25 TABLET, FILM COATED ORAL at 09:16

## 2022-11-16 RX ADMIN — SODIUM CHLORIDE 75 ML/HR: 9 INJECTION, SOLUTION INTRAVENOUS at 20:12

## 2022-11-16 RX ADMIN — IMATINIB MESYLATE 200 MG: 100 TABLET, FILM COATED ORAL at 09:21

## 2022-11-16 RX ADMIN — HYDROMORPHONE HYDROCHLORIDE 1 MG: 1 INJECTION, SOLUTION INTRAMUSCULAR; INTRAVENOUS; SUBCUTANEOUS at 09:55

## 2022-11-16 RX ADMIN — HYDROCODONE BITARTRATE AND ACETAMINOPHEN 1 TABLET: 7.5; 325 TABLET ORAL at 03:09

## 2022-11-16 RX ADMIN — IMATINIB MESYLATE 400 MG: 400 TABLET, FILM COATED ORAL at 09:20

## 2022-11-16 RX ADMIN — GUAIFENESIN 600 MG: 600 TABLET, EXTENDED RELEASE ORAL at 21:56

## 2022-11-16 RX ADMIN — ALLOPURINOL 100 MG: 100 TABLET ORAL at 05:32

## 2022-11-16 RX ADMIN — CARVEDILOL 6.25 MG: 6.25 TABLET, FILM COATED ORAL at 16:48

## 2022-11-16 RX ADMIN — DEXTROSE MONOHYDRATE 25 G: 25 INJECTION, SOLUTION INTRAVENOUS at 20:09

## 2022-11-16 RX ADMIN — ALPRAZOLAM 0.5 MG: 0.5 TABLET ORAL at 21:56

## 2022-11-16 RX ADMIN — ALLOPURINOL 100 MG: 100 TABLET ORAL at 21:56

## 2022-11-16 RX ADMIN — SODIUM CHLORIDE 75 ML/HR: 9 INJECTION, SOLUTION INTRAVENOUS at 05:39

## 2022-11-16 RX ADMIN — PROCHLORPERAZINE EDISYLATE 5 MG: 5 INJECTION INTRAMUSCULAR; INTRAVENOUS at 14:17

## 2022-11-16 RX ADMIN — FOLIC ACID 1 MG: 1 TABLET ORAL at 09:16

## 2022-11-16 RX ADMIN — BUDESONIDE AND FORMOTEROL FUMARATE DIHYDRATE 2 PUFF: 160; 4.5 AEROSOL RESPIRATORY (INHALATION) at 18:31

## 2022-11-16 RX ADMIN — ENOXAPARIN SODIUM 70 MG: 100 INJECTION SUBCUTANEOUS at 09:16

## 2022-11-16 RX ADMIN — MIRTAZAPINE 30 MG: 15 TABLET, ORALLY DISINTEGRATING ORAL at 21:56

## 2022-11-16 RX ADMIN — ALLOPURINOL 100 MG: 100 TABLET ORAL at 13:43

## 2022-11-16 RX ADMIN — INSULIN GLARGINE 30 UNITS: 100 INJECTION, SOLUTION SUBCUTANEOUS at 09:19

## 2022-11-16 RX ADMIN — HYDROMORPHONE HYDROCHLORIDE 1 MG: 1 INJECTION, SOLUTION INTRAMUSCULAR; INTRAVENOUS; SUBCUTANEOUS at 12:05

## 2022-11-16 RX ADMIN — HYDROMORPHONE HYDROCHLORIDE 0.5 MG: 1 INJECTION, SOLUTION INTRAMUSCULAR; INTRAVENOUS; SUBCUTANEOUS at 08:19

## 2022-11-16 RX ADMIN — GUAIFENESIN 600 MG: 600 TABLET, EXTENDED RELEASE ORAL at 09:16

## 2022-11-16 RX ADMIN — FENTANYL 1 PATCH: 25 PATCH, EXTENDED RELEASE TRANSDERMAL at 08:00

## 2022-11-16 RX ADMIN — SCOPALAMINE 1 PATCH: 1 PATCH, EXTENDED RELEASE TRANSDERMAL at 19:55

## 2022-11-16 RX ADMIN — BUDESONIDE AND FORMOTEROL FUMARATE DIHYDRATE 2 PUFF: 160; 4.5 AEROSOL RESPIRATORY (INHALATION) at 07:08

## 2022-11-16 RX ADMIN — ONDANSETRON 4 MG: 2 INJECTION INTRAMUSCULAR; INTRAVENOUS at 11:25

## 2022-11-16 RX ADMIN — PROCHLORPERAZINE EDISYLATE 5 MG: 5 INJECTION INTRAMUSCULAR; INTRAVENOUS at 21:56

## 2022-11-16 RX ADMIN — MORPHINE SULFATE 2 MG: 2 INJECTION, SOLUTION INTRAMUSCULAR; INTRAVENOUS at 01:31

## 2022-11-16 RX ADMIN — MORPHINE SULFATE 2 MG: 2 INJECTION, SOLUTION INTRAMUSCULAR; INTRAVENOUS at 05:32

## 2022-11-16 RX ADMIN — CITALOPRAM HYDROBROMIDE 10 MG: 20 TABLET ORAL at 09:18

## 2022-11-16 RX ADMIN — Medication 10 ML: at 08:19

## 2022-11-16 RX ADMIN — Medication 10 ML: at 22:03

## 2022-11-16 RX ADMIN — HYDROMORPHONE HYDROCHLORIDE 0.5 MG: 1 INJECTION, SOLUTION INTRAMUSCULAR; INTRAVENOUS; SUBCUTANEOUS at 19:16

## 2022-11-16 RX ADMIN — ASPIRIN 325 MG: 325 TABLET, COATED ORAL at 09:16

## 2022-11-16 RX ADMIN — ENOXAPARIN SODIUM 70 MG: 100 INJECTION SUBCUTANEOUS at 01:29

## 2022-11-16 RX ADMIN — HYDROMORPHONE HYDROCHLORIDE 0.5 MG: 1 INJECTION, SOLUTION INTRAMUSCULAR; INTRAVENOUS; SUBCUTANEOUS at 21:56

## 2022-11-16 NOTE — PROGRESS NOTES
Hematology/Oncology Inpatient Progress Note    PATIENT NAME: Nieves Mc  : 1975  MRN: [id number]    CHIEF COMPLAINT: Chest pain    HISTORY OF PRESENT ILLNESS:      Nieves Mc is a 47 y.o. female who presented to [facility] on 11/3/2022 with complaints of chest pain.  Past medical history significant for CML, depression with anxiety, PE, type 2 diabetes mellitus.  Patient reported that around 9 AM the day of presentation she began to have chest pain.  Stated it was accompanied by the loss of hearing in her left ear and left arm numbness.  She reported she has never experienced this previous.  Since being in the emergency department the chest pain has subsided.  She describes the chest pain as dull.  She did not take anything at home to try to alleviate it.  She reported chronic leg swelling but denied any recent weight gain.  She reported shortness of breath.  She reported chronic cough.  She denied any fever or chills.  She also complained of nausea, vomiting and diarrhea. She was most recently seen in the emergency department on 10/31/2022 for abdominal pain at the instruction of her oncologist.  A CT of the abdomen and pelvis was fairly unchanged from prior and showed severe generalized anasarca and severe hepatosplenomegaly.  A CT of the chest was performed to rule out pulmonary embolism which was also negative and therefore the patient was discharged home.     Evaluation in the ED showed a negative troponin less than 0.010, elevated proBNP at 29,519, glucose 411, normal kidney function, elevated alk phos 1826.  WBC elevated at 376.70, hemoglobin 7.8, MCV 82.8, platelets 404,000, ANC 48.97, blasts 66%.  EKG showed sinus tachycardia, chest x-ray showed cardiomegaly and mild pulmonary vascular congestion which is increased in comparison.  The patient received Lasix, regular insulin, Nitrostat and a normal saline 250 mL bolus in the ED.  She was admitted to the hospital for further  evaluation and treatment.     11/03/22  Hematology/Oncology was consulted on a patient known to us and followed in the office by Dr. Lerma for her diagnosis of CML currently on imatinib and hydroxyurea.  Patient was initially seen in consultation in October 2018 while she was inpatient at [facility] with CML.  At that time she was under the care of Dr. Roach and  and was managed on imatinib.  Patient had a repeat bone marrow aspiration and biopsy in December 2018 that was consistent with chronic myeloid leukemia.  BCR ABL positive, chronic phase.  ABL kinase mutational analysis negative.  Patient was initiated on Tasigna in February 2019 but this was discontinued in June 2022 due to diagnosis of cardiomyopathy.  At that time it was determined that the imatinib would be the safer treatment option and she was reinitiated on this.  Her course has been complicated due to noncompliance and difficulty tolerating TKI's.    Flow Cytometry 11/3/22 - increased neutrophilic cells, increased atypical myeloid blasts (13% of leukocytes), aberrant CD56 expression on granulocytes and monocytes, increased basophils (7% of leukocytes)     11/4/22: Imatinib 400 mg daily restarted     11/5/22: Continues Hydrea 2500 mg daily, Imatinib 400 mg daily, allopurinol 100 mg daily. Patient denies chest pain or SOB. Discussed with patient that WBC is 284.9 today. Patient relays that she started her home supply of imatinib yesterday at 400 mg.      She  has a past medical history of Bone pain, COVID-19 virus infection (01/12/2022), Diabetes mellitus (HCC), Extremity pain, Leg pain, Migraine, Pulmonary embolism (HCC), and Vision loss.     PCP: Alida Latham APRN    History of present illness was reviewed and is unchanged from the previous visit. 11/05/22      Subjective      Patient complaining of left leg pain.  This is persistent and now she is having lower back pain.  Pain medicines not helping          MEDICATIONS:    Scheduled Meds:   "albuterol, 10 mg, Nebulization, Once  allopurinol, 100 mg, Oral, Q8H  aspirin, 325 mg, Oral, Daily  budesonide-formoterol, 2 puff, Inhalation, BID - RT  carvedilol, 6.25 mg, Oral, BID With Meals  fentaNYL, 1 patch, Transdermal, Q72H   And  [START ON 11/17/2022] Check Fentanyl Patch Placement, 1 each, Does not apply, Q12H  citalopram, 10 mg, Oral, Daily  enoxaparin, 70 mg, Subcutaneous, Q12H  First Mouthwash (Magic Mouthwash), 5 mL, Swish & Spit, Q4H  folic acid, 1 mg, Oral, Daily  guaiFENesin, 600 mg, Oral, Q12H  imatinib, 400 mg, Oral, Daily   And  imatinib, 200 mg, Oral, Daily  insulin glargine, 30 Units, Subcutaneous, Daily  insulin lispro, 10 Units, Subcutaneous, TID AC  insulin lispro, 2-9 Units, Subcutaneous, TID With Meals  mirtazapine, 30 mg, Oral, Nightly  sodium chloride, 250 mL, Intravenous, Once  sodium chloride, 10 mL, Intravenous, Q12H       Continuous Infusions:  Pharmacy to Dose enoxaparin (LOVENOX),   sodium chloride, 75 mL/hr, Last Rate: 75 mL/hr (11/16/22 0539)       PRN Meds:  •  acetaminophen **OR** acetaminophen **OR** acetaminophen  •  ALPRAZolam  •  aluminum-magnesium hydroxide-simethicone  •  dextrose  •  dextrose  •  glucagon (human recombinant)  •  HYDROcodone-acetaminophen  •  HYDROmorphone  •  influenza vaccine  •  melatonin  •  nitroglycerin  •  ondansetron **OR** ondansetron  •  Pharmacy to Dose enoxaparin (LOVENOX)  •  prochlorperazine  •  sodium chloride  •  sodium chloride     ALLERGIES:    Allergies   Allergen Reactions   • Hydromorphone GI Intolerance     dilaudid   • Morphine Nausea And Vomiting   • Propofol Hives     Previously tolerated being premedicated with diphenhydramine and famotadine       Objective    VITALS:   /70 (Patient Position: Lying)   Pulse 99   Temp 97.8 °F (36.6 °C) (Oral)   Resp 17   Ht 162.6 cm (64\")   Wt 68.5 kg (151 lb)   LMP 05/25/2022 (Approximate)   SpO2 98%   BMI 25.92 kg/m²     PHYSICAL EXAM:     Physical Exam  Constitutional:       " Appearance: Normal appearance. She is ill-appearing.      Comments: Frail, temporal muscle wasting   HENT:      Head: Normocephalic and atraumatic.      Mouth/Throat:      Mouth: Mucous membranes are moist.   Eyes:      Pupils: Pupils are equal, round, and reactive to light.   Cardiovascular:      Rate and Rhythm: Normal rate and regular rhythm.      Pulses: Normal pulses.      Heart sounds: No murmur heard.  Pulmonary:      Effort: Pulmonary effort is normal.      Breath sounds: Normal breath sounds.   Abdominal:      General: There is no distension.      Palpations: Abdomen is soft. There is no mass.      Tenderness: There is no abdominal tenderness.   Musculoskeletal:         General: Normal range of motion.      Cervical back: Normal range of motion and neck supple.   Skin:     General: Skin is warm.   Neurological:      General: No focal deficit present.      Mental Status: She is alert.   Psychiatric:         Mood and Affect: Mood normal.     I have reexamined the patient and the results are consistent with the previously documented exam. Meghann Lerma MD     RECENT LABS:    Lab Results (last 24 hours)     Procedure Component Value Units Date/Time    POC Glucose Once [[id number]]  (Abnormal) Collected: 11/16/22 1145    Specimen: Blood Updated: 11/16/22 1146     Glucose 135 mg/dL      Comment: Serial Number: [id number]:  [id number]       POC Glucose Once [[id number]]  (Abnormal) Collected: 11/16/22 0737    Specimen: Blood Updated: 11/16/22 0738     Glucose 111 mg/dL      Comment: Serial Number: [id number]:  [id number]       Manual Differential [[id number]]  (Abnormal) Collected: 11/16/22 0320    Specimen: Blood Updated: 11/16/22 0627     Neutrophil % 46.0 %      Lymphocyte % 3.0 %      Monocyte % 2.0 %      Eosinophil % 5.0 %      Basophil % 9.0 %      Bands %  13.0 %      Metamyelocyte % 11.0 %      Myelocyte % 2.0 %      Promyelocyte % 2.0 %      Blasts % 7.0 %      Neutrophils Absolute  139.89 10*3/mm3      Lymphocytes Absolute 7.11 10*3/mm3      Monocytes Absolute 4.74 10*3/mm3      Eosinophils Absolute 11.86 10*3/mm3      Basophils Absolute 21.34 10*3/mm3      Anisocytosis Slight/1+     Microcytes Slight/1+     Poikilocytes Slight/1+     Polychromasia Slight/1+     Rouleaux Slight/1+     WBC Morphology Normal     Giant Platelets Slight/1+    Narrative:      Reviewed by Pathologist within the past 30 days on [id number]    CBC & Differential [[id number]]  (Abnormal) Collected: 11/16/22 0320    Specimen: Blood Updated: 11/16/22 0627    Narrative:      The following orders were created for panel order CBC & Differential.  Procedure                               Abnormality         Status                     ---------                               -----------         ------                     CBC Auto Differential[[id number]]        Abnormal            Final result               Scan Slide[[id number]]                                       Final result                 Please view results for these tests on the individual orders.    Scan Slide [[id number]] Collected: 11/16/22 0320    Specimen: Blood Updated: 11/16/22 0627     Scan Slide --     Comment: See Manual Differential Results       CBC Auto Differential [[id number]]  (Abnormal) Collected: 11/16/22 0320    Specimen: Blood Updated: 11/16/22 0627     .10 10*3/mm3      RBC 3.48 10*6/mm3      Hemoglobin 7.9 g/dL      Hematocrit 28.0 %      MCV 80.4 fL      MCH 22.8 pg      MCHC 28.4 g/dL      RDW 20.3 %      RDW-SD 55.6 fl      MPV 8.6 fL      Platelets 321 10*3/mm3     Narrative:      Modified report. Previous result was Hemogram on 11/16/2022 at 0408 EST.  The previously reported component NRBC is no longer being reported. Previous result was 0.0 /100 WBC (Reference Range: 0.0-0.2 /100 WBC) on 11/16/2022 at 0408 EST.    Comprehensive Metabolic Panel [[id number]]  (Abnormal) Collected: 11/16/22 0320    Specimen: Blood Updated: 11/16/22 0455      Glucose 86 mg/dL      BUN 24 mg/dL      Creatinine 0.69 mg/dL      Sodium 137 mmol/L      Potassium 5.1 mmol/L      Chloride 109 mmol/L      CO2 17.0 mmol/L      Calcium 8.8 mg/dL      Total Protein 6.7 g/dL      Albumin 3.50 g/dL      ALT (SGPT) 74 U/L      AST (SGOT) 82 U/L      Alkaline Phosphatase 1,356 U/L      Total Bilirubin 1.0 mg/dL      Globulin 3.2 gm/dL      A/G Ratio 1.1 g/dL      BUN/Creatinine Ratio 34.8     Anion Gap 11.0 mmol/L      eGFR 107.9 mL/min/1.73      Comment: National Kidney Foundation and American Society of Nephrology (ASN) Task Force recommended calculation based on the Chronic Kidney Disease Epidemiology Collaboration (CKD-EPI) equation refit without adjustment for race.       Narrative:      GFR Normal >60  Chronic Kidney Disease <60  Kidney Failure <15      Phosphorus [[id number]]  (Abnormal) Collected: 11/16/22 0320    Specimen: Blood Updated: 11/16/22 0443     Phosphorus 4.9 mg/dL     Uric Acid [[id number]]  (Abnormal) Collected: 11/16/22 0320    Specimen: Blood Updated: 11/16/22 0443     Uric Acid 6.4 mg/dL           PENDING RESULTS: Flow cytometry    IMAGING REVIEWED:  MRI Lumbar Spine Without Contrast    Result Date: 11/16/2022  1. The study is limited. The patient vomited during the examination and could not continue. The limited images demonstrate no significant disc bulge, canal or foraminal stenosis. Mild L5-S1 facet arthropathy is noted. 2. Localizer images demonstrate the presence of massive splenomegaly and mild hepatomegaly.  Electronically Signed By-Heather Kelsey MD On:11/16/2022 1:58 PM This report was finalized on [id number] by  Heather Kelsey MD.      Assessment & Plan      ASSESSMENT:    Hematology/Oncology was consulted on a patient known to us and followed in the office by Dr. Lerma for her diagnosis of CML currently on imatinib and hydroxyurea.  Patient has been very noncompliant with treatments.  She is currently on Gleevec 400 mg daily but this was recently  increased to 600 mg daily.  She is also supposed to be on hydroxyurea twice a day.  She has been noncompliant with medication intake and also routine follow-up in the office for ongoing care.  Patient has had progressive shortness of breath and swelling on her lower extremities.    1. CML not in remission: Currently on imatinib 600 mg daily.  Hydroxyurea is currently on hold. Patient has been noncompliant with her medicines in the past.  Continue Gleevec 600 mg p.o. daily. Improving WBC to 225  2. Oral lesion likely leukemic cells improving on Gleevec continue the same.  Reviewed  3. Acute kidney injury: Nephrologist on board.  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  On allopurinol creatinine improving.  Improved  4. Left lower extremity discomfort: Doppler was negative for clots  5. New back pain: We will obtain MRI of the spine  6. Hyperkalemia: likely secondary to entresto/ANAYA/TLS, now normalized  7. Anemia: Secondary to CML and TKI, hydroxyurea.    8. Hyperleukocytosis: Secondary to CML, beginning to improve on Gleevec  9. History of pulmonary embolism: On Xarelto as an outpatient.  Lovenox currently.  10. Acute on chronic diastolic heart failure: Management per primary team and cardiology.  Worsening cardiac disease.  Discussed with Dr. Cox, patient to be transferred to [facility]  11. Multiple chronic conditions: Type 2 diabetes mellitus, hypertension, depression with anxiety.  12. Chest wall pain: unclear etiology, CT chest non contrast ordered.     PLANS:    1. Continue Daily CBCs  2. Check mag level, phosphorus and daily uric acid  3. Continue Gleevec  4. MRI lumbar spine  5. Negative Doppler studies  6. Reviewed results of flow cytometry  7. She has been seen by psychiatrist and currently on Celexa 10 mg daily.  Remeron increased to 30 mg at night.  Outpatient counseling has been recommended  8. Continue Allopurinol  9. We will continue to  follow      Electronically signed by Meghann Lerma MD, 11/16/22, 4:24 PM EST.

## 2022-11-16 NOTE — PROGRESS NOTES
"NEPHROLOGY PROGRESS NOTE        Patient Care Team:  Alida Latham APRN as PCP - General (Nurse Practitioner)  Meghann Lerma MD as Consulting Physician (Hematology and Oncology)  Hamida Feldman MD as Consulting Physician (Cardiology)      Provider:  Giselle Andre MD  Patient Name: Nieves Mc  :  1975    SUBJECTIVE:  F/u ANAYA/Hyperkalemia  No chest pain or SOA  Creatinine improved to 1.1 today.  On IV fluid normal saline 100/h    Medication:  albuterol, 10 mg, Nebulization, Once  allopurinol, 100 mg, Oral, Q8H  aspirin, 325 mg, Oral, Daily  budesonide-formoterol, 2 puff, Inhalation, BID - RT  carvedilol, 6.25 mg, Oral, BID With Meals  citalopram, 10 mg, Oral, Daily  enoxaparin, 70 mg, Subcutaneous, Q12H  fentaNYL, 1 patch, Transdermal, Q72H  First Mouthwash (Magic Mouthwash), 5 mL, Swish & Spit, Q4H  folic acid, 1 mg, Oral, Daily  guaiFENesin, 600 mg, Oral, Q12H  imatinib, 400 mg, Oral, Daily   And  imatinib, 200 mg, Oral, Daily  insulin glargine, 30 Units, Subcutaneous, Daily  insulin lispro, 10 Units, Subcutaneous, TID AC  insulin lispro, 2-9 Units, Subcutaneous, TID With Meals  mirtazapine, 30 mg, Oral, Nightly  sodium chloride, 250 mL, Intravenous, Once  sodium chloride, 10 mL, Intravenous, Q12H      Pharmacy to Dose enoxaparin (LOVENOX),   sodium chloride, 75 mL/hr, Last Rate: 75 mL/hr (22 0539)        OBJECTIVE    Vital Sign Min/Max for last 24 hours  Temp  Min: 96.5 °F (35.8 °C)  Max: 97.5 °F (36.4 °C)   BP  Min: 100/64  Max: 129/76   Pulse  Min: 93  Max: 109   Resp  Min: 16  Max: 20   SpO2  Min: 95 %  Max: 99 %   No data recorded   Weight  Min: 68 kg (150 lb)  Max: 71.1 kg (156 lb 12 oz)     Flowsheet Rows    Flowsheet Row First Filed Value   Admission Height 162.6 cm (64\") Documented at 2022   Admission Weight 68 kg (150 lb) Documented at 2022          I/O this shift:  In: 955 [P.O.:300; I.V.:655]  Out: 900 [Urine:900]  I/O last 3 completed " shifts:  In: 860 [P.O.:860]  Out: -     Physical Exam:  General Appearance: alert, appears stated age and cooperative  Head: normocephalic, without obvious abnormality and atraumatic  Eyes: conjunctivae and sclerae normal and no icterus  Neck: supple and no JVD  Lungs: clear to auscultation and respirations regular  Heart: regular rhythm & normal rate and normal S1, S2  Chest: Wall no abnormalities observed  Abdomen: normal bowel sounds and soft, nontender  Extremities: moves extremities well, trace edema, no cyanosis and no redness  Skin: no bleeding, bruising or rash, turgor normal, color normal and no lesions noted  Neurologic: Alert, and oriented. No focal deficits    Labs:    WBC WBC   Date Value Ref Range Status   11/16/2022 237.10 (C) 3.40 - 10.80 10*3/mm3 Preliminary   11/15/2022 213.10 (C) 3.40 - 10.80 10*3/mm3 Final   11/14/2022 222.80 (C) 3.40 - 10.80 10*3/mm3 Final      HGB Hemoglobin   Date Value Ref Range Status   11/16/2022 7.9 (L) 12.0 - 15.9 g/dL Preliminary   11/15/2022 7.3 (L) 12.0 - 15.9 g/dL Final   11/14/2022 7.5 (L) 12.0 - 15.9 g/dL Final      HCT Hematocrit   Date Value Ref Range Status   11/16/2022 28.0 (L) 34.0 - 46.6 % Preliminary   11/15/2022 25.6 (L) 34.0 - 46.6 % Final   11/14/2022 26.9 (L) 34.0 - 46.6 % Final      Platelets No results found for: LABPLAT   MCV MCV   Date Value Ref Range Status   11/16/2022 80.4 79.0 - 97.0 fL Preliminary   11/15/2022 79.1 79.0 - 97.0 fL Final   11/14/2022 81.6 79.0 - 97.0 fL Final          Sodium Sodium   Date Value Ref Range Status   11/16/2022 137 136 - 145 mmol/L Final   11/15/2022 138 136 - 145 mmol/L Final   11/14/2022 138 136 - 145 mmol/L Final      Potassium Potassium   Date Value Ref Range Status   11/16/2022 5.1 3.5 - 5.2 mmol/L Final   11/15/2022 4.9 3.5 - 5.2 mmol/L Final   11/14/2022 4.7 3.5 - 5.2 mmol/L Final      Chloride Chloride   Date Value Ref Range Status   11/16/2022 109 (H) 98 - 107 mmol/L Final   11/15/2022 107 98 - 107 mmol/L  Final   11/14/2022 106 98 - 107 mmol/L Final      CO2 CO2   Date Value Ref Range Status   11/16/2022 17.0 (L) 22.0 - 29.0 mmol/L Final   11/15/2022 20.0 (L) 22.0 - 29.0 mmol/L Final   11/14/2022 20.0 (L) 22.0 - 29.0 mmol/L Final      BUN BUN   Date Value Ref Range Status   11/16/2022 24 (H) 6 - 20 mg/dL Final   11/15/2022 30 (H) 6 - 20 mg/dL Final   11/14/2022 31 (H) 6 - 20 mg/dL Final      Creatinine Creatinine   Date Value Ref Range Status   11/16/2022 0.69 0.57 - 1.00 mg/dL Final   11/15/2022 1.05 (H) 0.57 - 1.00 mg/dL Final   11/14/2022 1.03 (H) 0.57 - 1.00 mg/dL Final      Calcium Calcium   Date Value Ref Range Status   11/16/2022 8.8 8.6 - 10.5 mg/dL Final   11/15/2022 8.6 8.6 - 10.5 mg/dL Final   11/14/2022 8.1 (L) 8.6 - 10.5 mg/dL Final      PO4 No components found for: PO4   Albumin Albumin   Date Value Ref Range Status   11/16/2022 3.50 3.50 - 5.20 g/dL Final   11/15/2022 3.60 3.50 - 5.20 g/dL Final   11/14/2022 3.30 (L) 3.50 - 5.20 g/dL Final      Magnesium No results found for: MG   Uric Acid No components found for: URIC ACID     Imaging Results (Last 72 Hours)     Procedure Component Value Units Date/Time    XR Tibia Fibula 2 View Left [[id number]] Collected: 11/14/22 1547     Updated: 11/14/22 1550    Narrative:      DATE OF EXAM:  11/14/2022 3:06 PM     PROCEDURE:  XR TIBIA FIBULA 2 VW LEFT-     INDICATIONS:  new pain. tender; R07.9-Chest pain, unspecified; J81.0-Acute pulmonary  edema; R06.00-Dyspnea, unspecified; D72.829-Elevated white blood cell  count, unspecified; C92.10-Chronic myeloid leukemia, BCR/ABL-positive,  not having achieved remission     COMPARISON:  No Comparisons Available     TECHNIQUE:   2 views of the left tibia and fibula was/were obtained.     FINDINGS:  No left tibia or fibular fracture is seen. The knee and ankle joints  appear appropriately aligned, without appreciable osteoarthritic change.  Overlying soft tissues are normal. No osteolytic or osteoblastic  abnormality.        Impression:      Negative tibial fibula radiograph.     Electronically Signed By-Heather Kelsey MD On:11/14/2022 3:47 PM  This report was finalized on [id number] by  Heather Kelsey MD.    XR Femur 2 View Left [[id number]] Collected: 11/14/22 1545     Updated: 11/14/22 1549    Narrative:      DATE OF EXAM:  11/14/2022 2:59 PM     PROCEDURE:  XR FEMUR 2 VW LEFT-     INDICATIONS:  new onset pain w tenderness; R07.9-Chest pain, unspecified; J81.0-Acute  pulmonary edema; R06.00-Dyspnea, unspecified; D72.829-Elevated white  blood cell count, unspecified; C92.10-Chronic myeloid leukemia,  BCR/ABL-positive, not having achieved remission     COMPARISON:  AP pelvis with left hip radiographs 6/30/2022     TECHNIQUE:   2 views of the left femur was/were obtained.     FINDINGS:  No left femur fracture is seen. Hip and knee joints appear appropriately  aligned. No definite joint effusion. No osteolytic or osteoblastic  abnormality. There may be mild narrowing of the left hip joint space.  Tubal ligation clip is seen in the pelvis on the left. The overlying  left thigh soft tissues appear unremarkable.       Impression:      No acute abnormality of the left femur. No suspicious osseous  abnormality is identified.     Electronically Signed By-Heather Kelsey MD On:11/14/2022 3:47 PM  This report was finalized on [id number] by  Heather Kelsey MD.    CT Chest Without Contrast Diagnostic [[id number]] Collected: 11/13/22 1410     Updated: 11/13/22 1419    Narrative:         DATE OF EXAM:  11/13/2022 1:42 PM     PROCEDURE:  CT CHEST WO CONTRAST DIAGNOSTIC-     INDICATIONS:   Chest wall pain, nontraumatic, infection or inflammation suspected, xray  done; R07.9-Chest pain, unspecified; J81.0-Acute pulmonary edema;  R06.00-Dyspnea, unspecified; D72.829-Elevated white blood cell count,  unspecified; C92.10-Chronic myeloid leukemia, BCR/ABL-positive, not  having achieved remission     COMPARISON:   CT chest with contrast 10/31/2022      TECHNIQUE:  Routine transaxial slices were obtained through the chest without the  administration of intravenous contrast. Reconstructed coronal and  sagittal images were also obtained. Automated exposure control and  iterative construction methods were used.      FINDINGS:  Soft tissues of the lower neck are without acute abnormality. The heart  is enlarged. Negative for mediastinal, hilar, or axillary adenopathy.  There is extensive anasarca similar to prior study. Negative for pleural  effusion.     The trachea and mainstem bronchi are patent. No pneumothorax. Mild  septal thickening consistent with mild pulmonary edema. No consolidation  or findings of pneumonia. No suspicious solid pulmonary nodule. Mild  scattered groundglass opacities related to pulmonary edema.     Upper abdomen demonstrates hepatosplenomegaly. Gallbladder absent. No  free fluid in the upper abdomen. Skin thickening of both breasts similar  to the prior study.       Impression:      1. Cardiomegaly and mild pulmonary edema.  2. Diffuse anasarca.  3. Hepatosplenomegaly.     Electronically Signed By-Zac Turcios MD On:11/13/2022 2:17 PM  This report was finalized on [id number] by  Zac Turcios MD.          Results for orders placed during the hospital encounter of 11/03/22    XR Tibia Fibula 2 View Left    Narrative  DATE OF EXAM:  11/14/2022 3:06 PM    PROCEDURE:  XR TIBIA FIBULA 2 VW LEFT-    INDICATIONS:  new pain. tender; R07.9-Chest pain, unspecified; J81.0-Acute pulmonary  edema; R06.00-Dyspnea, unspecified; D72.829-Elevated white blood cell  count, unspecified; C92.10-Chronic myeloid leukemia, BCR/ABL-positive,  not having achieved remission    COMPARISON:  No Comparisons Available    TECHNIQUE:  2 views of the left tibia and fibula was/were obtained.    FINDINGS:  No left tibia or fibular fracture is seen. The knee and ankle joints  appear appropriately aligned, without appreciable osteoarthritic change.  Overlying soft tissues  are normal. No osteolytic or osteoblastic  abnormality.    Impression  Negative tibial fibula radiograph.    Electronically Signed By-Heather Kelsey MD On:11/14/2022 3:47 PM  This report was finalized on [id number] by  Heather Kelsey MD.      XR Femur 2 View Left    Narrative  DATE OF EXAM:  11/14/2022 2:59 PM    PROCEDURE:  XR FEMUR 2 VW LEFT-    INDICATIONS:  new onset pain w tenderness; R07.9-Chest pain, unspecified; J81.0-Acute  pulmonary edema; R06.00-Dyspnea, unspecified; D72.829-Elevated white  blood cell count, unspecified; C92.10-Chronic myeloid leukemia,  BCR/ABL-positive, not having achieved remission    COMPARISON:  AP pelvis with left hip radiographs 6/30/2022    TECHNIQUE:  2 views of the left femur was/were obtained.    FINDINGS:  No left femur fracture is seen. Hip and knee joints appear appropriately  aligned. No definite joint effusion. No osteolytic or osteoblastic  abnormality. There may be mild narrowing of the left hip joint space.  Tubal ligation clip is seen in the pelvis on the left. The overlying  left thigh soft tissues appear unremarkable.    Impression  No acute abnormality of the left femur. No suspicious osseous  abnormality is identified.    Electronically Signed By-Heather Kelsey MD On:11/14/2022 3:47 PM  This report was finalized on [id number] by  Heather Kelsey MD.      XR Chest 1 View    Narrative  DATE OF EXAM:  11/3/2022 10:16 AM    PROCEDURE:  XR CHEST 1 VW-    INDICATIONS:  Chest Pain Protocol    COMPARISON:  10/31/2022 and prior    TECHNIQUE:  Portable Chest    FINDINGS:    Study is limited by overlying support apparatus. Lung volumes are low.  There is cardiomegaly which is significant change when accounting for  differences in inspiratory volume. Mild pulmonary vascular congestion is  noted. Increased hazy and interstitial opacities are noted diffusely  which are accentuated by low lung volumes. No obvious pleural effusion  noted. Osseous structures are  unremarkable    Impression  IMPRESSION :  Cardiomegaly and mild pulmonary vascular congestion which is increase in  the comparison. This may be in part artifactual from low lung volumes  versus underlying pulmonary edema or pneumonia in the correct clinical  setting[    Electronically Signed By-Freddy Toribio On:11/3/2022 10:26 AM  This report was finalized on [id number] by  Freddy Toribio, .      Results for orders placed during the hospital encounter of 11/03/22    Duplex Venous Lower Extremity - Bilateral CAR    Interpretation Summary  •  Chronic right lower extremity superficial thrombophlebitis noted in the small saphenous.  •  All other veins appeared normal bilaterally.        ASSESSMENT / PLAN      Chest pain, unspecified type    CML (chronic myelocytic leukemia) (HCC)    Depression with anxiety    History of pulmonary embolism    Medically noncompliant    Type 2 diabetes mellitus with diabetic polyneuropathy, with long-term current use of insulin (HCC)    Acute diastolic CHF (congestive heart failure) (HCC)    NICM (nonischemic cardiomyopathy) (HCC)    Bilateral lower extremity edema    • ANAYA--likely pre-renal and or ATN in setting of hypotension/ARBs/diuretics. hold entresto, diuretics. UA neg. Uric acid 9.5  • Hyperkalemia--due to ANAYA/entresto. Hold for now  • DM2  • CML--on chemo  • Chronic diastolic heart failure--compensated. Given rising CR will hold diuretics for now  • TLS--high tumor burden. Hydrate    Creatinine better, uric acid down to 6.5  Continue IV fluids at 75 mill per hour  Continue allopurinol for tumor lysis syndrome.  Monitor renal function fluid status of juanis Andre MD    11/16/22  06:21 EST

## 2022-11-16 NOTE — PLAN OF CARE
Goal Outcome Evaluation:               Pt transferred from [facility] for closer monitoring. Pt very nauseated and dry heaving. Zofran given. Will continue to monitor.       Problem: Adult Inpatient Plan of Care  Goal: Plan of Care Review  Outcome: Ongoing, Progressing  Goal: Patient-Specific Goal (Individualized)  Outcome: Ongoing, Progressing  Goal: Absence of Hospital-Acquired Illness or Injury  Outcome: Ongoing, Progressing  Goal: Optimal Comfort and Wellbeing  Outcome: Ongoing, Progressing  Goal: Readiness for Transition of Care  Outcome: Ongoing, Progressing

## 2022-11-16 NOTE — PLAN OF CARE
Goal Outcome Evaluation:  Plan of Care Reviewed With: patient           Outcome Evaluation: Pt up on and off throughout the night, c/o left leg pain gave PRN pain medication frequently. IVF currently infusing at this time for kidney functions. Cardio, nephro, and Heme/Onc currently following, will continue to monitor.

## 2022-11-16 NOTE — PROGRESS NOTES
[facility] Medicine Services Daily Progress Note    Patient Name: Nivees Mc  : 1975  MRN: [id number]  Primary Care Physician:  Alida Latham APRN  Date of admission: 11/3/2022      Subjective          Brief interim history: 47-year-old female with nonischemic cardiomyopathy, HFpEF, PE, anxiety, T2DM, history of medical noncompliance, depression/anxiety and CML. She was admitted on 11/3/2022, presented to [facility] ED, complaining of chest pain that started at around 9 AM while she was at home with her .  Chest pain is described as dull in nature, radiating to the left with paresthesia.  Cardiac biomarkers with troponin negative x3 and EKG shows sinus tachycardia.  Laboratories notable for [place] 57[address] and chest x-ray showed cardiomegaly. Patient is admitted with atypical chest pain chest pain, rule out for ACS.  Seen in consultation by cardiologist with recommendation and followed by oncologist for CML.     2022: Seen and examined in follow evaluation.  Chest pain-free and resting comfortably in bed.     2022: Seen and examined in follow-up.  Feels and looks better this morning.  No complaints or event.    2022: Seen and examined in follow-up.  Feels slightly better but still dyspneic with activity.  Underwent NST today with result pending      2022  Patient seen and examined  Reported feeling generally weak otherwise no new complaints  Initial plan was to discharge patient today however cardiologist wants to keep patient as she still has bilateral pedal edema  Furosemide frequency was increased and metolazone added    2022  Patient seen and examined  Continues with generalized body weakness  Pedal edema with no significant change despite increased diuretic frequency.  We will continue to monitor    2022  Patient seen and examined  Has no new complaint  Furosemide was increased to 40 mg twice daily, metolazone and Entresto added by  cardiology.  Patient with acute renal injury-most likely due to diuretics  Also noted to have hyperkalemia-due to acute kidney injury and Entresto use  Was given Lokelma  With decreased diuretic to daily and hold metolazone    11/10/2022  Patient seen and examined  No new complaint  Bilateral lower extremity swelling improving   Still on creatinine now stabilized  Change furosemide to 40 mg daily  BMP in the a.m.    11/11/2022  Patient seen and examined  Bilateral lower extremity swelling has improved  Serum creatinine worsening  Furosemide and Entresto discontinued  Nephrologist consulted  BMP in the a.m.    11/12/2022  Patient seen and examined  No new complaint    11/13/2022  Patient seen and examined  Complaining of pain on the right breast    11/14  Seen and examined vitals and labs reviewed  Discussed with nursing staff  Severe pain left thigh and leg.  No signs of vascular compromise or swelling or infection noted  X-rays ordered    11/15  Seen and examined  Still w pain lt Le  V doppleerr  neg    11/16 still complaining of severe pain  MRI ordered by oncologist  Increasing pain medications  Transfer patient to end-tidal CO2 given-year-old      Review of systems  All other system reviewed and negative except as above      Objective      Vitals:   Temp:  [96.5 °F (35.8 °C)-97.9 °F (36.6 °C)] 97.9 °F (36.6 °C)  Heart Rate:  [] 104  Resp:  [16-20] 18  BP: (100-129)/(64-89) 119/89    Physical Exam  Vitals reviewed.   Constitutional:       Comments: Chronically ill looking   HENT:      Head: Normocephalic.      Nose: Nose normal.      Mouth/Throat:      Mouth: Mucous membranes are moist.   Eyes:      Extraocular Movements: Extraocular movements intact.      Conjunctiva/sclera: Conjunctivae normal.      Pupils: Pupils are equal, round, and reactive to light.   Cardiovascular:      Rate and Rhythm: Normal rate and regular rhythm.   Pulmonary:      Effort: No respiratory distress.   Abdominal:      General:  Bowel sounds are normal.      Palpations: Abdomen is soft.      Tenderness: There is no abdominal tenderness.   Musculoskeletal:         General: Normal range of motion.      Cervical back: Neck supple.   Skin:     General: Skin is warm.   Neurological:      Mental Status: She is alert and oriented to person, place, and time.   Psychiatric:         Mood and Affect: Mood normal.          Result Review    Result Review:  I have personally reviewed the results from the time of this admission to 11/16/2022 11:25 EST and agree with these findings:  [x]  Laboratory  []  Microbiology  [x]  Radiology  [x]  EKG/Telemetry   [x]  Cardiology/Vascular   []  Pathology  []  Old records  []  Other:  Most notable findings include:       Assessment & Plan        albuterol, 10 mg, Nebulization, Once  allopurinol, 100 mg, Oral, Q8H  aspirin, 325 mg, Oral, Daily  budesonide-formoterol, 2 puff, Inhalation, BID - RT  carvedilol, 6.25 mg, Oral, BID With Meals  fentaNYL, 1 patch, Transdermal, Q72H   And  [START ON 11/17/2022] Check Fentanyl Patch Placement, 1 each, Does not apply, Q12H  citalopram, 10 mg, Oral, Daily  enoxaparin, 70 mg, Subcutaneous, Q12H  First Mouthwash (Magic Mouthwash), 5 mL, Swish & Spit, Q4H  folic acid, 1 mg, Oral, Daily  guaiFENesin, 600 mg, Oral, Q12H  imatinib, 400 mg, Oral, Daily   And  imatinib, 200 mg, Oral, Daily  insulin glargine, 30 Units, Subcutaneous, Daily  insulin lispro, 10 Units, Subcutaneous, TID AC  insulin lispro, 2-9 Units, Subcutaneous, TID With Meals  sodium chloride, 250 mL, Intravenous, Once  sodium chloride, 10 mL, Intravenous, Q12H       Pharmacy to Dose enoxaparin (LOVENOX),   sodium chloride, 75 mL/hr, Last Rate: 75 mL/hr (11/16/22 0539)         Active Hospital Problems:  Active Hospital Problems    Diagnosis    • **Chest pain, unspecified type    • Bilateral lower extremity edema    • Acute diastolic CHF (congestive heart failure) (HCC)    • NICM (nonischemic cardiomyopathy) (HCC)    •  Type 2 diabetes mellitus with diabetic polyneuropathy, with long-term current use of insulin ([facility])    • Depression with anxiety    • Medically noncompliant    • History of pulmonary embolism    • CML (chronic myelocytic leukemia) ([facility])      Assessment/plan:      Chest pain (atypical)  MI ruled out with cardiac enzymes and EKG  Had stress test 11/6/2022-negative for ischemia  Was seen by cardiologist who recommended no further work-up  Supportive care     Acute on chronic systolic/ diastolic heart failure  Nonischemic cardiomyopathy  EF 45%  On low-dose beta-blocker, losartan and diuretic  Still with pedal edema and shortness of breath with exertion  Defer diuretics and fluids to nephrologist/cardiologist  Received Lokelma for hyperkalemia  Repeat echo reviewed shows EF 26 to 30%    Acute kidney injury  Possible tumor lysis syndrome  Serum creatinine continues to worsening despite discontinuing diuretic and Entresto  Nephrologist following  Uric acid 9.5  ?  Volume depletion and underlying tumor lysis syndrome  Started on IV hydration  Also started on allopurinol      Hyperkalemia-  Most likely due to acute kidney injury/Entresto use  Improved with  hyperkalemia protocol    11/14Severe pain left thigh and leg.  No signs of vascular compromise or swelling or infection noted  X-rays neg femur and tib-fib  V doppler --Chronic right lower extremity superficial thrombophlebitis noted in the small saphenous  Worsening pain.  Increased Dilaudid to 50 mcg  Discontinue morphine and add Dilaudid.  Increase Norco to 10 mg as needed.  Monitor end-tidal CO2  Consult orthopedic  Check MRI left lower extremity   MRI lumbar spine reviewed showing massive splenomegaly      History of pulmonary embolus  Currently on prophylactic Lovenox  Hematologist following  On Xarelto at home>. lovenox full dose for now    Diabetes mellitus type 2  Continue on insulin regimen  Monitor blood sugar and adjust insulin as needed        CML not in  remission  Elevated alkaline phosphatase  Hyperleukocytosis  On Gleevec/hydroxyurea  Oncologist following  Poor compliance with medications noted    Anxiety/depression  On Remeron/Celexa  Was seen by psychiatrist while inpatient     Generalized weakness  Due to underlying comorbidities  Was seen by physical therapy/Occupational Therapy-no skilled therapy needed         DVT prophylaxis:lovenox  Medical DVT prophylaxis orders are present.    CODE STATUS:    Level Of Support Discussed With: Patient  Code Status (Patient has no pulse and is not breathing): CPR (Attempt to Resuscitate)  Medical Interventions (Patient has pulse or is breathing): Full Support      Disposition:  I expect patient to be discharged     Electronically signed by George Singh MD, 11/16/22, 11:25 EST.  [facility] Hospitalist Team

## 2022-11-16 NOTE — CASE MANAGEMENT/SOCIAL WORK
Continued Stay Note  ZOHAIB Amor     Patient Name: Nieves Mc  MRN: [id number]  Today's Date: 11/16/2022    Admit Date: 11/3/2022    Plan: D/C Plan : Anticipate home with daughter and mother   Discharge Plan     Row Name 11/16/22 1501       Plan    Plan D/C Plan : Anticipate home with daughter and mother    Plan Comments DC Barriers: increasing pain, MRI planned today 11/16, increasing pt pain meds., Oncology, Cardiology and Nephrology following.           Expected Discharge Date and Time     Expected Discharge Date Expected Discharge Time    Nov 17, 2022       Phone communication or documentation only- no physical contact with patient or family.    Denisha Hills RN     Office Phone: [phone]  Office Cell: [phone]

## 2022-11-16 NOTE — PROGRESS NOTES
Reason for follow-up: CHF     Patient Care Team:  Alida Latham APRN as PCP - General (Nurse Practitioner)  Meghann Lerma MD as Consulting Physician (Hematology and Oncology)  Hamida Feldman MD as Consulting Physician (Cardiology)    Fracisco Hays   Nieves Mc is doing fair today     ROS    Hydromorphone, Morphine, and Propofol    Scheduled Meds:albuterol, 10 mg, Nebulization, Once  allopurinol, 100 mg, Oral, Q8H  aspirin, 325 mg, Oral, Daily  budesonide-formoterol, 2 puff, Inhalation, BID - RT  carvedilol, 6.25 mg, Oral, BID With Meals  fentaNYL, 1 patch, Transdermal, Q72H   And  [START ON 11/17/2022] Check Fentanyl Patch Placement, 1 each, Does not apply, Q12H  citalopram, 10 mg, Oral, Daily  enoxaparin, 70 mg, Subcutaneous, Q12H  First Mouthwash (Magic Mouthwash), 5 mL, Swish & Spit, Q4H  folic acid, 1 mg, Oral, Daily  guaiFENesin, 600 mg, Oral, Q12H  imatinib, 400 mg, Oral, Daily   And  imatinib, 200 mg, Oral, Daily  insulin glargine, 30 Units, Subcutaneous, Daily  insulin lispro, 10 Units, Subcutaneous, TID AC  insulin lispro, 2-9 Units, Subcutaneous, TID With Meals  mirtazapine, 30 mg, Oral, Nightly  sodium chloride, 250 mL, Intravenous, Once  sodium chloride, 10 mL, Intravenous, Q12H      Continuous Infusions:Pharmacy to Dose enoxaparin (LOVENOX),   sodium chloride, 75 mL/hr, Last Rate: 75 mL/hr (11/16/22 0539)      PRN Meds:.•  acetaminophen **OR** acetaminophen **OR** acetaminophen  •  ALPRAZolam  •  aluminum-magnesium hydroxide-simethicone  •  dextrose  •  dextrose  •  glucagon (human recombinant)  •  HYDROcodone-acetaminophen  •  HYDROmorphone  •  influenza vaccine  •  melatonin  •  nitroglycerin  •  ondansetron **OR** ondansetron  •  Pharmacy to Dose enoxaparin (LOVENOX)  •  prochlorperazine  •  sodium chloride  •  sodium chloride      VITAL SIGNS  Vitals:    11/16/22 0709 11/16/22 0727 11/16/22 0749 11/16/22 1235   BP:  108/64 119/89 116/70   BP Location:   Right arm   "  Patient Position:   Lying Lying   Pulse: 105  104 99   Resp: 16  18 17   Temp:   97.9 °F (36.6 °C) 97.8 °F (36.6 °C)   TempSrc:   Oral Oral   SpO2: 96%  99% 98%   Weight:  68.5 kg (151 lb)     Height:  162.6 cm (64\")         Flowsheet Rows    Flowsheet Row First Filed Value   Admission Height 162.6 cm (64\") Documented at 11/03/2022 0930   Admission Weight 68 kg (150 lb) Documented at 11/03/2022 0930             Physical Exam  VITALS REVIEWED    General:      well developed, in no acute distress.    Head:      normocephalic and atraumatic.    Eyes:      PERRL/EOM intact, conjunctiva and sclera clear with out nystagmus.    Neck:      no masses, thyromegaly,  trachea central with normal respiratory effort and PMI displaced laterally  Lungs:      Clear  Heart:       Regular rate and rhythm  Msk:      no deformity or scoliosis noted of thoracic or lumbar spine.    Pulses:      pulses normal in all 4 extremities.    Extremities:       2+ edema  Neurologic:      no focal deficits.   alert oriented x3  Skin:      intact without lesions or rashes.    Psych:      alert and cooperative; normal mood and affect; normal attention span and concentration.          LAB RESULTS (LAST 7 DAYS)    CBC  Results from last 7 days   Lab Units 11/16/22  0320 11/15/22  0107 11/14/22  0709 11/13/22  0141 11/11/22  2356 11/11/22  1820 11/11/22  0055   WBC 10*3/mm3 237.10* 213.10* 222.80* 255.40* 270.60* 298.80* 283.70*   RBC 10*6/mm3 3.48* 3.24* 3.30* 3.38* 3.50* 3.80 3.78   HEMOGLOBIN g/dL 7.9* 7.3* 7.5* 7.8* 8.3* 8.5* 9.0*   HEMATOCRIT % 28.0* 25.6* 26.9* 27.1* 28.1* 30.0* 30.6*   MCV fL 80.4 79.1 81.6 80.2 80.2 78.9* 80.9   PLATELETS 10*3/mm3 321 249 257 278 300 339 340       BMP  Results from last 7 days   Lab Units 11/16/22  0320 11/15/22  0107 11/14/22  0709 11/13/22  0141 11/11/22  2356 11/11/22  1820 11/11/22  1212   SODIUM mmol/L 137 138 138 135* 134* 133* 133*   POTASSIUM mmol/L 5.1 4.9 4.7 5.0 5.1 6.1* 5.9*   CHLORIDE mmol/L 109* " 107 106 101 97* 99 97*   CO2 mmol/L 17.0* 20.0* 20.0* 23.0 25.0 23.0 25.0   BUN mg/dL 24* 30* 31* 42* 55* 53* 51*   CREATININE mg/dL 0.69 1.05* 1.03* 1.14* 1.88* 1.58* 1.58*   GLUCOSE mg/dL 86 195* 237* 190* 221* 320* 129*   PHOSPHORUS mg/dL 4.9* 4.1 3.9 4.6* 6.0* 6.2*  --        CMP   Results from last 7 days   Lab Units 11/16/22  0320 11/15/22  0107 11/14/22  0709 11/13/22  0141 11/11/22  2356 11/11/22  1820 11/11/22  1212   SODIUM mmol/L 137 138 138 135* 134* 133* 133*   POTASSIUM mmol/L 5.1 4.9 4.7 5.0 5.1 6.1* 5.9*   CHLORIDE mmol/L 109* 107 106 101 97* 99 97*   CO2 mmol/L 17.0* 20.0* 20.0* 23.0 25.0 23.0 25.0   BUN mg/dL 24* 30* 31* 42* 55* 53* 51*   CREATININE mg/dL 0.69 1.05* 1.03* 1.14* 1.88* 1.58* 1.58*   GLUCOSE mg/dL 86 195* 237* 190* 221* 320* 129*   ALBUMIN g/dL 3.50 3.60 3.30* 3.10* 3.30* 3.30*  --    BILIRUBIN mg/dL 1.0 0.7 0.5 0.5 0.6 0.7  --    ALK PHOS U/L 1,356* 1,061* 1,032* 1,296* 1,460* 1,532*  --    AST (SGOT) U/L 82* 47* 37* 44* 45* 59*  --    ALT (SGPT) U/L 74* 44* 38* 41* 38* 43*  --          BNP        TROPONIN        CoAg        Creatinine Clearance  Estimated Creatinine Clearance: 95.8 mL/min (by C-G formula based on SCr of 0.69 mg/dL).    ABG          EKG    I personally reviewed the patient's EKG/Telemetry data: Sinus rhythm      Assessment & Plan       Chest pain, unspecified type    CML (chronic myelocytic leukemia) (HCC)    Depression with anxiety    History of pulmonary embolism    Medically noncompliant    Type 2 diabetes mellitus with diabetic polyneuropathy, with long-term current use of insulin (HCC)    Acute diastolic CHF (congestive heart failure) ([facility])    NICM (nonischemic cardiomyopathy) ([facility])    Bilateral lower extremity edema      Hope A Jesusita is a 47-year-old female patient who has CML, nonischemic cardiomyopathy, presented to the hospital with CHF exacerbation.  Stress test was done which showed no perfusion defects.  Patient was started on Entresto as well as Coreg  and was treated with IV diuretics.  Entresto and diuretics have been discontinued due to worsening renal function.  Her creatinine is back down to normal at this time.  I urge to restart Entresto and also p.o. diuretics.  Patient is still fluid overloaded.  Her ejection fraction is down to 25 to 30% which is a decline from the previous study.  We can also consider transferring her to [facility] heart failure center.      Hamida Feldman MD  11/16/22  15:19 EST

## 2022-11-16 NOTE — NURSING NOTE
MD changed order for Fentanyl patch. Removed newly placed 25MCG patch, and discarded properly. Witnessed by Melinda Gomez.  New fentanyl patch placed on Left shoulder.

## 2022-11-17 NOTE — SIGNIFICANT NOTE
Sunny did not want nurse to call family to let them know she was being  transferred to [facility], patient states she will let them know.

## 2022-11-17 NOTE — NURSING NOTE
Patient alert and oriented, continues with nausea and pain, MRI of abdomen done today, not able to get MRI of Left knee,doppler of left leg - no clots, patient became nauseated, O2@2 liters, pain meds given, patient insisted on Dilaudid IV,  compazine given, patient being transferred to [facility] 2, patient states she doesn't need me to notify family of her transfer. VSS.,

## 2022-11-17 NOTE — SIGNIFICANT NOTE
Patient states she will notify family that she was being transferred to [facility], Patient did not want nurse to call anyone.

## 2022-11-17 NOTE — CASE MANAGEMENT/SOCIAL WORK
Case Management Discharge Note      Final Note: [facility]  Transportation Services  Ambulance: [place]    Final Discharge Disposition Code: 02 - short term [facility] for [facility]

## 2022-11-17 NOTE — NURSING NOTE
Patient left by Ambulance, transferred to [facility] and [facility].

## 2022-11-17 NOTE — DISCHARGE SUMMARY
[facility] Medicine Services  DISCHARGE SUMMARY    Patient Name: Nieves Mc  : 1975  MRN: [id number]    Date of Admission: 11/3/2022  Discharge Diagnosis: acute on chronic HFrEF  Date of Discharge:  2022  Primary Care Physician: Alida Latham APRN      Presenting Problem:   Acute pulmonary edema (HCC) [J81.0]  Leukocytosis, unspecified type [D72.829]  Dyspnea, unspecified type [R06.00]  Chest pain, unspecified type [R07.9]    Active and Resolved Hospital Problems:  Active Hospital Problems    Diagnosis POA   • **Chest pain, unspecified type [R07.9] Yes   • Bilateral lower extremity edema [R60.0] Unknown   • Acute diastolic CHF (congestive heart failure) (HCC) [I50.31] Yes   • NICM (nonischemic cardiomyopathy) ([facility]) [I42.8] Yes   • Type 2 diabetes mellitus with diabetic polyneuropathy, with long-term current use of insulin (HCC) [E11.42, Z79.4] Not Applicable   • Depression with anxiety [F41.8] Yes   • Medically noncompliant [Z91.199] Not Applicable   • History of pulmonary embolism [Z86.711] Yes   • CML (chronic myelocytic leukemia) ([facility]) [C92.10] Yes      Resolved Hospital Problems   No resolved problems to display.         Hospital Course     Hospital Course:    Nieves Mc is a 47F with PMHx of depression, anxiety, PE, DM, CML and non-ischemic cardiomyopathy who presented to [facility] on 11/3/2022 due to chest pain.  Workup revealed that she was fluid overloaded 2/2 heart failure. Oncology and Cardiology consulted.   Oncology transfused PRBC and started Gleevac along with hydroxyuria.  Diuresis started by cardiology. Stress test negative for ischemia.  Patient remained fluid overloaded and diuresis increased to Lasix 40mg IV BID and Entresto started.  She had some improvement.  Diuresis induced ANAYA led to nephrology consult and diuretics held.  Creatinine improved.  However, patient started to become fluid overloaded again once diuretics held.  Oncology  obtained MRI due to backpain, splenomegaly observed. Echo on 11/16/22 showed EF 25-30%, a drop in EF of 10% since 6/30/2022. Cardiology recommended transfer to  heart failure center.  Patient accepted and transferred to [facility] heart failure [place] on 11/16/2022 in agreement with plan below        DISCHARGE Follow Up Recommendations for labs and diagnostics:     - Transfer to [facility] Heart failure [place]    Day of Discharge     Vital Signs:  Temp:  [97.5 °F (36.4 °C)-98.4 °F (36.9 °C)] 98.4 °F (36.9 °C)  Heart Rate:  [] 101  Resp:  [16-18] 18  BP: (108-133)/(64-89) 123/79  Flow (L/min):  [2] 2    Physical Exam:  Physical Exam  Constitutional:       General: She is not in acute distress.     Appearance: She is ill-appearing. She is not toxic-appearing.   HENT:      Head: Normocephalic and atraumatic.      Nose: Nose normal. No congestion.      Mouth/Throat:      Pharynx: Oropharynx is clear. No oropharyngeal exudate.   Eyes:      General: No scleral icterus.  Cardiovascular:      Rate and Rhythm: Normal rate and regular rhythm.      Heart sounds: No murmur heard.    No friction rub. No gallop.   Pulmonary:      Effort: No respiratory distress.      Breath sounds: No wheezing or rales.   Abdominal:      General: There is no distension.      Tenderness: There is no abdominal tenderness. There is no guarding.   Musculoskeletal:         General: No swelling or deformity.      Cervical back: Normal range of motion. No rigidity.      Right lower leg: Edema present.      Left lower leg: Edema present.   Skin:     Coloration: Skin is not jaundiced.      Findings: No bruising or lesion.   Neurological:      General: No focal deficit present.      Mental Status: She is oriented to person, place, and time.      Motor: Weakness present.            Pertinent  and/or Most Recent Results     LAB RESULTS:      Lab 11/16/22  0320 11/15/22  0107 11/14/22  0709 11/13/22  0141 11/11/22  2356   .10* 213.10* 222.80*  255.40* 270.60*   HEMOGLOBIN 7.9* 7.3* 7.5* 7.8* 8.3*   HEMATOCRIT 28.0* 25.6* 26.9* 27.1* 28.1*   PLATELETS 321 249 257 278 300   NEUTROS .89* 108.68* 120.31* 125.15* 127.18*   EOS ABS 11.86* 12.79* 17.82* 15.32* 8.12*   MCV 80.4 79.1 81.6 80.2 80.2         Lab 11/16/22  0320 11/15/22  0107 11/14/22  0709 11/13/22  0141 11/11/22  2356   SODIUM 137 138 138 135* 134*   POTASSIUM 5.1 4.9 4.7 5.0 5.1   CHLORIDE 109* 107 106 101 97*   CO2 17.0* 20.0* 20.0* 23.0 25.0   ANION GAP 11.0 11.0 12.0 11.0 12.0   BUN 24* 30* 31* 42* 55*   CREATININE 0.69 1.05* 1.03* 1.14* 1.88*   EGFR 107.9 66.1 67.6 59.9* 32.9*   GLUCOSE 86 195* 237* 190* 221*   CALCIUM 8.8 8.6 8.1* 8.2* 8.3*   PHOSPHORUS 4.9* 4.1 3.9 4.6* 6.0*         Lab 11/16/22  0320 11/15/22  0107 11/14/22  0709 11/13/22  0141 11/11/22  2356   TOTAL PROTEIN 6.7 6.7 6.1 6.0 6.2   ALBUMIN 3.50 3.60 3.30* 3.10* 3.30*   GLOBULIN 3.2 3.1 2.8 2.9 2.9   ALT (SGPT) 74* 44* 38* 41* 38*   AST (SGOT) 82* 47* 37* 44* 45*   BILIRUBIN 1.0 0.7 0.5 0.5 0.6   ALK PHOS 1,356* 1,061* 1,032* 1,296* 1,460*                     Brief Urine Lab Results  (Last result in the past 365 days)      Color   Clarity   Blood   Leuk Est   Nitrite   Protein   CREAT   Urine HCG        11/11/22 1332             22.5         11/11/22 1332 Yellow   Clear   Negative   Negative   Negative   Negative               Microbiology Results (last 10 days)     ** No results found for the last 240 hours. **          CT Abdomen Pelvis Without Contrast    Result Date: 10/31/2022  Impression:  1. Severe generalized anasarca. 2. Features of mild interstitial and alveolar edema in the imaged lung bases. 3. Severe hepatosplenomegaly, similar to 6/1/2022. 4. Mild to moderate cardiomegaly, without significant change. 5. Cholecystectomy. 6. Small quantity lower abdominal and pelvic ascites, increased since 6/20/2022.    Electronically Signed By-Heather Kelsey MD On:10/31/2022 2:52 PM This report was finalized on  [id number] by  Heather Kelsey MD.    XR Femur 2 View Left    Result Date: 11/14/2022  Impression: No acute abnormality of the left femur. No suspicious osseous abnormality is identified.  Electronically Signed By-Heather Kelsey MD On:11/14/2022 3:47 PM This report was finalized on [id number] by  Heather Kelsey MD.    XR Tibia Fibula 2 View Left    Result Date: 11/14/2022  Impression: Negative tibial fibula radiograph.  Electronically Signed By-Heather Kelsey MD On:11/14/2022 3:47 PM This report was finalized on [id number] by  Heather Kelsey MD.    CT Chest Without Contrast Diagnostic    Result Date: 11/13/2022  Impression: 1. Cardiomegaly and mild pulmonary edema. 2. Diffuse anasarca. 3. Hepatosplenomegaly.  Electronically Signed By-Zac Turcios MD On:11/13/2022 2:17 PM This report was finalized on [id number] by  Zac Turcios MD.    MRI Lumbar Spine Without Contrast    Result Date: 11/16/2022  Impression: 1. The study is limited. The patient vomited during the examination and could not continue. The limited images demonstrate no significant disc bulge, canal or foraminal stenosis. Mild L5-S1 facet arthropathy is noted. 2. Localizer images demonstrate the presence of massive splenomegaly and mild hepatomegaly.  Electronically Signed By-Heather Kelsey MD On:11/16/2022 1:58 PM This report was finalized on [id number] by  Heather Kelsey MD.    XR Chest 1 View    Result Date: 11/3/2022  Impression: IMPRESSION : Cardiomegaly and mild pulmonary vascular congestion which is increase in the comparison. This may be in part artifactual from low lung volumes versus underlying pulmonary edema or pneumonia in the correct clinical setting[  Electronically Signed By-Freddy Toribio On:11/3/2022 10:26 AM This report was finalized on [id number] by  Freddy Toribio, .    XR Chest 1 View    Result Date: 10/31/2022  Impression: 1.     Improved aeration throughout the lungs bilaterally. There are mild residual  hazy groundglass densities throughout the lungs with mild interstitial changes. These finds suggest mild underlying chronic pulmonary edema. Stable cardiomegaly is noted.  Electronically Signed By-Amador Marquez MD On:10/31/2022 2:34 PM This report was finalized on [id number] by  Amador Marquez MD.    CT Angiogram Chest Pulmonary Embolism    Result Date: 10/31/2022  Impression:  1. No pulmonary embolus. 2. There is dilated cardiomegaly and small pleural effusion, not significant changed. A tiny left pleural effusion is also present. 3. There is extensive anasarca and hepatosplenomegaly. Diffuse groundglass opacities are present bilaterally. The overall appearance suggests third spacing of fluid and/or volume overload. No airspace consolidations.  Electronically Signed By-Yobany Ng DO On:10/31/2022 5:32 PM This report was finalized on [id number] by  Yobany Ng DO.      Results for orders placed during the hospital encounter of 11/03/22    Duplex Venous Lower Extremity - Bilateral CAR    Interpretation Summary  •  Chronic right lower extremity superficial thrombophlebitis noted in the small saphenous.  •  All other veins appeared normal bilaterally.      Results for orders placed during the hospital encounter of 11/03/22    Duplex Venous Lower Extremity - Bilateral CAR    Interpretation Summary  •  Chronic right lower extremity superficial thrombophlebitis noted in the small saphenous.  •  All other veins appeared normal bilaterally.      Results for orders placed during the hospital encounter of 11/03/22    Adult Transthoracic Echo Limited W/ Cont if Necessary Per Protocol    Interpretation Summary  •  Left ventricular ejection fraction appears to be 26 - 30%.  •  The left ventricular cavity is moderately dilated.  •  Left ventricular wall thickness is consistent with mild concentric hypertrophy.  •  Moderately reduced right ventricular systolic function noted.  •  The right ventricular cavity is  mildly dilated.  •  The right atrial cavity is mildly  dilated.  •  There has been a significant drop in ejection fraction compared to the previous study.      Labs Pending at Discharge:      Procedures Performed           Consults:   Consults     Date and Time Order Name Status Description    11/11/2022  2:27 AM Inpatient Nephrology Consult Completed     11/4/2022  1:12 PM Inpatient Psychiatrist Consult Completed     11/3/2022 12:43 PM Inpatient Cardiology Consult Completed     11/3/2022 12:42 PM Hematology & Oncology Inpatient Consult Completed     11/3/2022 11:47 AM Hospitalist (on-call MD unless specified)              Discharge Details        Discharge Medications      New Medications      Instructions Start Date   aspirin 81 MG EC tablet   81 mg, Oral, Daily      carvedilol 6.25 MG tablet  Commonly known as: COREG   6.25 mg, Oral, 2 Times Daily With Meals         Changes to Medications      Instructions Start Date   allopurinol 100 MG tablet  Commonly known as: ZYLOPRIM  What changed: when to take this   100 mg, Oral, 2 Times Daily      hydroxyurea 500 MG capsule  Commonly known as: HYDREA  What changed:   · how much to take  · Another medication with the same name was removed. Continue taking this medication, and follow the directions you see here.   500 mg, Oral, 2 Times Daily      imatinib 400 MG chemo tablet  Commonly known as: GLEEVEC  What changed: Another medication with the same name was changed. Make sure you understand how and when to take each.   400 mg, Oral, Daily, Take with food and glass of water. Do not take with Grapefruit juice.      imatinib 400 MG chemo tablet  Commonly known as: GLEEVEC  What changed:   · medication strength  · how much to take  · additional instructions   400 mg, Oral, Daily   Start Date: November 17, 2022     imatinib 100 MG chemo tablet  Commonly known as: GLEEVEC  What changed:   · medication strength  · how much to take  · additional instructions   200 mg, Oral,  Daily   Start Date: November 17, 2022        Continue These Medications      Instructions Start Date   ALPRAZolam 0.25 MG tablet  Commonly known as: Xanax   0.25 mg, Oral, Nightly PRN      BASAGLAR KWIKPEN 100 UNIT/ML injection pen   20 Units, Subcutaneous, Daily      citalopram 10 MG tablet  Commonly known as: CeleXA   10 mg, Oral, Daily, To begin 1/31/22      fentaNYL 25 MCG/HR patch  Commonly known as: DURAGESIC   1 patch, Transdermal, Every 72 Hours      folic acid 1 MG tablet  Commonly known as: FOLVITE   1 mg, Oral, Daily      Insulin Lispro 100 UNIT/ML injection pen  Commonly known as: ADMELOG SOLOSTAR   6 Units, Subcutaneous, 3 Times Daily      losartan 25 MG tablet  Commonly known as: COZAAR   25 mg, Oral, Every 24 Hours Scheduled      mirtazapine 15 MG tablet  Commonly known as: REMERON   15 mg, Oral, Nightly, Start Mirtazapine and decrease Trazodone to 1/2 tablet x 14 days and then stop Trazodone.      ondansetron ODT 8 MG disintegrating tablet  Commonly known as: ZOFRAN-ODT   8 mg, Translingual, Every 8 Hours PRN      Symbicort 160-4.5 MCG/ACT inhaler  Generic drug: budesonide-formoterol   2 puffs, Inhalation, 2 Times Daily - RT         Stop These Medications    hydrOXYzine 25 MG tablet  Commonly known as: ATARAX     metoprolol succinate XL 25 MG 24 hr tablet  Commonly known as: TOPROL-XL     Mucus Relief 600 MG 12 hr tablet  Generic drug: guaiFENesin            Allergies   Allergen Reactions   • Hydromorphone GI Intolerance     dilaudid   • Morphine Nausea And Vomiting   • Propofol Hives     Previously tolerated being premedicated with diphenhydramine and famotadine         Discharge Disposition: Transfer to [facility] Heart Failure [place]  Condition on Discharge: Poor prognosis  Diet:  Hospital:  Diet Order   Procedures   • Diet Regular Texture (IDDSI 7); Regular Consistency         Discharge Activity:   Activity Instructions     Gradually Increase Activity Until at Pre-Hospitalization Level               CODE STATUS:  Code Status and Medical Interventions:   Ordered at: 11/03/22 1245     Level Of Support Discussed With:    Patient     Code Status (Patient has no pulse and is not breathing):    CPR (Attempt to Resuscitate)     Medical Interventions (Patient has pulse or is breathing):    Full Support         Future Appointments   Date Time Provider Department Center   12/21/2022  8:00 AM Kishore Chatterjee MD [facility]       Additional Instructions for the Follow-ups that You Need to Schedule     Discharge Follow-up with PCP   As directed       Currently Documented PCP:    Alida Latham APRN    PCP Phone Number:    [phone]     Follow Up Details: Follow-up with PCP as needed         Discharge Follow-up with Specified Provider: Follow-up with your hematologist/oncologist next scheduled appointment   As directed      To: Follow-up with your hematologist/oncologist next scheduled appointment               Time spent on Discharge including face to face service:  35 minutes    This patient has been examined wearing appropriate Personal Protective Equipment and discussed with Patient. 11/16/22      Signature: Electronically signed by Neida Brown DO, 11/16/22, 10:16 PM EST.

## 2022-11-21 LAB — QT INTERVAL: 375 MS

## 2022-11-28 DIAGNOSIS — F41.8 DEPRESSION WITH ANXIETY: ICD-10-CM

## 2022-11-28 DIAGNOSIS — C95.90 LEUKEMIA NOT HAVING ACHIEVED REMISSION, UNSPECIFIED LEUKEMIA TYPE: ICD-10-CM

## 2022-11-28 NOTE — TELEPHONE ENCOUNTER
Rx Refill Note  Requested Prescriptions     Pending Prescriptions Disp Refills   • citalopram (CeleXA) 10 MG tablet 30 tablet 2     Sig: Take 1 tablet by mouth Daily. To begin 1/31/22      Last office visit with prescribing clinician: 10/19/2022      Next office visit with prescribing clinician: Visit date not found            Shama Olvera MA  11/28/22, 08:56 EST

## 2022-11-30 RX ORDER — CITALOPRAM 10 MG/1
10 TABLET ORAL DAILY
Qty: 30 TABLET | Refills: 2 | Status: SHIPPED | OUTPATIENT
Start: 2022-11-30 | End: 2023-03-02

## 2022-12-06 DIAGNOSIS — F41.8 DEPRESSION WITH ANXIETY: ICD-10-CM

## 2022-12-06 RX ORDER — MIRTAZAPINE 15 MG/1
15 TABLET, FILM COATED ORAL NIGHTLY
Qty: 30 TABLET | Refills: 2 | Status: CANCELLED | OUTPATIENT
Start: 2022-12-06 | End: 2023-01-05

## 2022-12-06 NOTE — TELEPHONE ENCOUNTER
Rx Refill Note  Requested Prescriptions     Pending Prescriptions Disp Refills   • mirtazapine (REMERON) 15 MG tablet 30 tablet 2     Sig: Take 1 tablet by mouth Every Night for 30 days. Start Mirtazapine and decrease Trazodone to 1/2 tablet x 14 days and then stop Trazodone.      Last office visit with prescribing clinician: 10/31/2022   Last telemedicine visit with prescribing clinician: Visit date not found   Next office visit with prescribing clinician: Visit date not found                         Would you like a call back once the refill request has been completed: [] Yes [] No    If the office needs to give you a call back, can they leave a voicemail: [] Yes [] No    Olga Rea Rep  12/06/22, 09:03 EST

## 2022-12-07 ENCOUNTER — TELEPHONE (OUTPATIENT)
Dept: ONCOLOGY | Facility: CLINIC | Age: 47
End: 2022-12-07

## 2022-12-07 DIAGNOSIS — F41.8 DEPRESSION WITH ANXIETY: ICD-10-CM

## 2022-12-07 RX ORDER — MIRTAZAPINE 15 MG/1
15 TABLET, FILM COATED ORAL NIGHTLY
Qty: 30 TABLET | Refills: 0 | Status: SHIPPED | OUTPATIENT
Start: 2022-12-07 | End: 2023-01-12

## 2022-12-07 NOTE — TELEPHONE ENCOUNTER
ATTEMPTED TO CONTACT PATIENT REGARDING HOSPITAL F/U APPT.  NO ANSWER.  VM FULL AND NOT ACCEPTING MESSAGES.  SPOKE WITH PATIENT'S MOTHER.  SHE STATES SHE THINKS PATIENT IS SEEING AN ONCOLOGIST IN [place].  SHE WILL HAVE PATIENT CALL OUR OFFICE.

## 2022-12-08 ENCOUNTER — APPOINTMENT (OUTPATIENT)
Dept: PAIN MEDICINE | Facility: HOSPITAL | Age: 47
End: 2022-12-08

## 2022-12-21 ENCOUNTER — TELEPHONE (OUTPATIENT)
Dept: ONCOLOGY | Facility: CLINIC | Age: 47
End: 2022-12-21

## 2022-12-21 NOTE — TELEPHONE ENCOUNTER
Caller: Samuel Cabezas    Relationship: Mother    Best call back number: [phone]    What is the best time to reach you: ANYTIME    Who are you requesting to speak with (clinical staff, provider,  specific staff member): SCHEDULING     What was the call regarding: SAMUEL IS CALLING TO GET HOPE AN APPT WITH DR SCOTT, SHE STATES SHE WAS SEEN IN [facility], BUT SHE NEVER SAW A PHYSICIAN OUTSIDE THE HOSPITAL OTHER THAN HER PCP    Do you require a callback: YES

## 2023-01-05 ENCOUNTER — OFFICE VISIT (OUTPATIENT)
Dept: ONCOLOGY | Facility: CLINIC | Age: 48
End: 2023-01-05
Payer: MEDICARE

## 2023-01-05 ENCOUNTER — LAB (OUTPATIENT)
Dept: LAB | Facility: HOSPITAL | Age: 48
End: 2023-01-05
Payer: MEDICARE

## 2023-01-05 ENCOUNTER — SPECIALTY PHARMACY (OUTPATIENT)
Dept: ONCOLOGY | Facility: CLINIC | Age: 48
End: 2023-01-05
Payer: MEDICARE

## 2023-01-05 VITALS
OXYGEN SATURATION: 99 % | WEIGHT: 138 LBS | DIASTOLIC BLOOD PRESSURE: 74 MMHG | RESPIRATION RATE: 20 BRPM | HEART RATE: 101 BPM | TEMPERATURE: 96.9 F | BODY MASS INDEX: 23.56 KG/M2 | HEIGHT: 64 IN | SYSTOLIC BLOOD PRESSURE: 115 MMHG

## 2023-01-05 DIAGNOSIS — C92.10 CML (CHRONIC MYELOCYTIC LEUKEMIA): ICD-10-CM

## 2023-01-05 DIAGNOSIS — C95.90 LEUKEMIA NOT HAVING ACHIEVED REMISSION, UNSPECIFIED LEUKEMIA TYPE: ICD-10-CM

## 2023-01-05 DIAGNOSIS — C92.10 BLAST CRISIS PHASE OF CHRONIC MYELOID LEUKEMIA: ICD-10-CM

## 2023-01-05 DIAGNOSIS — Z00.00 ROUTINE GENERAL MEDICAL EXAMINATION AT A HEALTH CARE FACILITY: Primary | ICD-10-CM

## 2023-01-05 DIAGNOSIS — C95.90 LEUKEMIA NOT HAVING ACHIEVED REMISSION, UNSPECIFIED LEUKEMIA TYPE: Primary | ICD-10-CM

## 2023-01-05 LAB
ALBUMIN SERPL-MCNC: 4 G/DL (ref 3.5–5.2)
ALBUMIN/GLOB SERPL: 1.3 G/DL
ALP SERPL-CCNC: 836 U/L (ref 39–117)
ALT SERPL W P-5'-P-CCNC: 18 U/L (ref 1–33)
ANION GAP SERPL CALCULATED.3IONS-SCNC: 13 MMOL/L (ref 5–15)
AST SERPL-CCNC: 27 U/L (ref 1–32)
BASOPHILS NFR BLD AUTO: ABNORMAL %
BILIRUB SERPL-MCNC: 1.1 MG/DL (ref 0–1.2)
BUN SERPL-MCNC: 25 MG/DL (ref 6–20)
BUN/CREAT SERPL: 23.1 (ref 7–25)
CALCIUM SPEC-SCNC: 9 MG/DL (ref 8.6–10.5)
CHLORIDE SERPL-SCNC: 100 MMOL/L (ref 98–107)
CO2 SERPL-SCNC: 28 MMOL/L (ref 22–29)
CREAT SERPL-MCNC: 1.08 MG/DL (ref 0.57–1)
DEPRECATED RDW RBC AUTO: 67.8 FL (ref 37–54)
EGFRCR SERPLBLD CKD-EPI 2021: 63.9 ML/MIN/1.73
EOSINOPHIL # BLD AUTO: 0.19 10*3/MM3 (ref 0–0.4)
EOSINOPHIL NFR BLD AUTO: 1 % (ref 0.3–6.2)
ERYTHROCYTE [DISTWIDTH] IN BLOOD BY AUTOMATED COUNT: 22.1 % (ref 12.3–15.4)
GLOBULIN UR ELPH-MCNC: 3.1 GM/DL
GLUCOSE SERPL-MCNC: 241 MG/DL (ref 65–99)
HCT VFR BLD AUTO: 35.1 % (ref 34–46.6)
HGB BLD-MCNC: 10.4 G/DL (ref 12–15.9)
HOLD SPECIMEN: NORMAL
LDH SERPL-CCNC: 621 U/L (ref 135–214)
LYMPHOCYTES NFR BLD AUTO: ABNORMAL %
MCH RBC QN AUTO: 26.3 PG (ref 26.6–33)
MCHC RBC AUTO-ENTMCNC: 29.6 G/DL (ref 31.5–35.7)
MCV RBC AUTO: 88.9 FL (ref 79–97)
MONOCYTES # BLD AUTO: 2.21 10*3/MM3 (ref 0.1–0.9)
MONOCYTES NFR BLD AUTO: 11.1 % (ref 5–12)
NEUTROPHILS NFR BLD AUTO: ABNORMAL %
PLATELET # BLD AUTO: 1386 10*3/MM3 (ref 140–450)
PMV BLD AUTO: 9.3 FL (ref 6–12)
POTASSIUM SERPL-SCNC: 4.3 MMOL/L (ref 3.5–5.2)
PROT SERPL-MCNC: 7.1 G/DL (ref 6–8.5)
RBC # BLD AUTO: 3.95 10*6/MM3 (ref 3.77–5.28)
SODIUM SERPL-SCNC: 141 MMOL/L (ref 136–145)
WBC NRBC COR # BLD: 20 10*3/MM3 (ref 3.4–10.8)

## 2023-01-05 PROCEDURE — 80053 COMPREHEN METABOLIC PANEL: CPT

## 2023-01-05 PROCEDURE — 99215 OFFICE O/P EST HI 40 MIN: CPT | Performed by: INTERNAL MEDICINE

## 2023-01-05 PROCEDURE — 83615 LACTATE (LD) (LDH) ENZYME: CPT | Performed by: INTERNAL MEDICINE

## 2023-01-05 PROCEDURE — 36415 COLL VENOUS BLD VENIPUNCTURE: CPT

## 2023-01-05 PROCEDURE — 85025 COMPLETE CBC W/AUTO DIFF WBC: CPT

## 2023-01-05 RX ORDER — FUROSEMIDE 20 MG/1
20 TABLET ORAL DAILY
COMMUNITY
Start: 2022-12-19

## 2023-01-05 RX ORDER — GABAPENTIN 300 MG/1
600 CAPSULE ORAL
COMMUNITY
Start: 2022-11-29 | End: 2023-03-10

## 2023-01-05 RX ORDER — ACETAMINOPHEN 325 MG/1
650 TABLET ORAL
COMMUNITY
Start: 2022-11-17

## 2023-01-05 RX ORDER — BUDESONIDE AND FORMOTEROL FUMARATE DIHYDRATE 160; 4.5 UG/1; UG/1
AEROSOL RESPIRATORY (INHALATION)
COMMUNITY
Start: 2022-11-17 | End: 2023-01-05

## 2023-01-05 RX ORDER — LOSARTAN POTASSIUM 25 MG/1
12.5 TABLET ORAL
COMMUNITY
Start: 2022-11-29 | End: 2023-01-06

## 2023-01-05 RX ORDER — FENTANYL 25 UG/H
25 PATCH TRANSDERMAL
COMMUNITY
Start: 2022-11-17 | End: 2023-01-05

## 2023-01-05 RX ORDER — OXYMETAZOLINE HYDROCHLORIDE 0.05 G/100ML
SPRAY NASAL
COMMUNITY
Start: 2022-11-17

## 2023-01-05 RX ORDER — MIRTAZAPINE 15 MG/1
15 TABLET, FILM COATED ORAL
COMMUNITY
Start: 2022-11-17 | End: 2023-01-06

## 2023-01-05 RX ORDER — TRAZODONE HYDROCHLORIDE 50 MG/1
50 TABLET ORAL
COMMUNITY
Start: 2022-11-17 | End: 2023-01-12

## 2023-01-05 RX ORDER — PROMETHAZINE HYDROCHLORIDE 12.5 MG/1
TABLET ORAL
COMMUNITY
Start: 2022-12-19 | End: 2023-03-15

## 2023-01-05 RX ORDER — HYDROXYUREA 500 MG/1
1000 CAPSULE ORAL
COMMUNITY
Start: 2022-11-17 | End: 2023-01-06

## 2023-01-05 RX ORDER — SACUBITRIL AND VALSARTAN 24; 26 MG/1; MG/1
TABLET, FILM COATED ORAL
COMMUNITY
Start: 2022-11-17 | End: 2023-03-10

## 2023-01-05 RX ORDER — SCOLOPAMINE TRANSDERMAL SYSTEM 1 MG/1
PATCH, EXTENDED RELEASE TRANSDERMAL
COMMUNITY
Start: 2022-12-05 | End: 2023-01-05

## 2023-01-05 RX ORDER — ALLOPURINOL 300 MG/1
300 TABLET ORAL
COMMUNITY
Start: 2022-11-17 | End: 2023-02-20

## 2023-01-05 RX ORDER — HYDROXYUREA 500 MG/1
500 CAPSULE ORAL 2 TIMES DAILY
Qty: 60 CAPSULE | Refills: 3 | Status: SHIPPED | OUTPATIENT
Start: 2023-01-05 | End: 2023-01-09

## 2023-01-05 RX ORDER — DAPAGLIFLOZIN 10 MG/1
10 TABLET, FILM COATED ORAL
COMMUNITY
Start: 2022-11-17 | End: 2023-03-10

## 2023-01-05 RX ORDER — METOPROLOL SUCCINATE 25 MG/1
25 TABLET, EXTENDED RELEASE ORAL
COMMUNITY
Start: 2022-11-29 | End: 2023-03-10

## 2023-01-05 NOTE — PROGRESS NOTES
Hematology/Oncology Outpatient Follow Up    PATIENT NAME:Nieves Mc  :1975  MRN: [id number]  PRIMARY CARE PHYSICIAN: Alida Latham APRN  REFERRING PHYSICIAN: Alida Latham, *    Chief Complaint   Patient presents with   • Follow-up     Leukemia not having achieved remission, unspecified leukemia type (HCC)        HISTORY OF PRESENT ILLNESS:      Patient is a 47 y.o. female with PMH significant for CML on treatment with Imatinib.  Patient stated that she typically feels poorly with Imatinib with frequent nausea and vomiting.  Also complained of weakness and fatigue.  Patient presented to ED on 10/22/18 with complaints of an elevated blood sugar around 400.  Stated she felt poorly and has had a productive cough with yellow sputum.  Complained of nausea and stated she was unable to keep any food down anytime she ate.  The patient reported subjective fever without chills.  She stated she had been coughing so hard that she was vomiting.  She denied hematemesis.  Denied diarrhea, constipation or blood in her stool.       Patient’s chest x-ray showed no evidence of acute pulmonary embolus.  The patient has ground-glass opacities throughout the lungs.  There is some air trapping noted which would appear to be   infectious.  Patient was started on antibiotics.      Hem/Onc was consulted on 10/23/18 for co-management of patient with CML.  Patient was seen by Dr. Lerma on 10/12 on previous admission for patient reported CML on Imatinib (Gleevec).  Patient reports she is under the care of Dr. Roach at [facility] in [place].  Patient was seen by Dr. Lerma in May 2018 when patient presented with hematuria, which was attributed to her Imatinib.  Dr. Lerma followed during hospitalization but then patient returned to Dr. Roach for her usual follow up.  She was again seen on 10/12.  Patient is stating she will switch to Dr. Lerma.    · Review of Dr. Roach’s note:  On 18 patient  had BCR-abl which was 61.326.  Patient had been on Imatinib 400 mg daily.  She had presented in March 2018 with WBC of 24, hemoglobin 13.1, platelet count of 1,067,000.  Patient apparently had follow up BCR-abl in August 2018, results are not available for review.  Her bone marrow aspiration and biopsy was also performed at that time is currently not available for review.    · 11/6/18 - .  Uric acid 6.5.  BCR-abl by RT-PCR was measured at 3.36%.    · Due to thrombocytosis, patient was placed on Hydroxyurea 500 mg twice a day on 12/11/18.  Platelet count juana to 900,000.  Patient was noncompliant with CBCs that were recommended.    Patient was asked to return to the office after not being able to reach her.   · 12/27/18 - Bone marrow aspiration and biopsy was consistent with chronic myeloid leukemia.  BCR-abl positive, chronic phase.  ABL kinase mutational analysis is currently pending on the bone marrow.    · 1/3/19 - Repeat CBC, WBC 17, hemoglobin 11.9, platelet count 1,072,000.  Hydroxyurea was increased to 1 gm twice a day with follow up CBC.    · 1/6/19 - Follow up CBC showed progressive increase in platelet to 1.1 million.  Patient was offered admission to the hospital, which she declined.  Hydroxyurea was increased to 1 gm three   times a day as of 1/6/19.   · 1/7/19 - EKG shows sinus tachycardia.   milliseconds.   ·  ABL kinase on peripheral blood was ordered on 1/11/19.  Based on sequence analysis, there was no mutation detected.    · 2/12/19 - Patient was initiated on Tasigna 300 mg twice a day.  · 2/13/19 - Urinalysis was negative for hematuria.   · 2/22/19 -  milliseconds.  · 3/13/19 - EKG with QTC is 413 milliseconds.  Nonspecific changes noted.    · 4/18/19 - EKG showed QTC prolonged to 453 milliseconds.  This is changed from prior.  · 4/18/19 - Free T4 normal at 0.91.  Magnesium was 1.7.  BUN is 6.  Creatinine 0.7.  Bilirubin 2.2.  Phosphorous normal 3.7.  TSH 0.43, normal.  Free  T3 was normal at 3.47.  Ionized calcium   normal at 1.23.  BCR-abl was 0.522%.    · 5/7/19 - EKG showed QTC of 441 milliseconds.  QT was 366.     · Patient has been lost to follow-up since 2019 for CML.  Patient states that she lost her insurance.  She also does not have any primary care physician at this time.  She is diabetic on insulin.  · Patient was recently admitted to the hospital for pneumonia due to COVID-19 infection.  At that time patient made a decision to become compliant with treatment.  She is currently on to [employer]a 300 mg p.o. twice a day.  She is also on hydroxyurea 1 g twice a day.  Overall she feels better, she has more energy.  · 3/1/2022 white count is 53.92, hemoglobin 11.7 and platelets are 472    · 6/1/2022: Patient has not been seen since March 2022.  Also verified that she has not been taking to Tasigna since February 2022.  She comes in today with cough for 1 and half months, shortness of breath, denies fevers.  · 6/30/2022 patient had 2D echocardiogram which showed an EF of 41 to 45%.  Left ventricular cavity is mildly dilated.  She was diagnosed with nonischemic cardiomyopathy  · 11/18/2022: Patient was transferred from [facility] to [facility] due to cardiac failure.  She was then seen by NP [facility] hematology department.  Patient underwent tumor aspiration and biopsy at that time did not show chronic myeloid leukemia in accelerated phase markedly hypercellular marrow with megakaryocytic and myeloid hyperplasia with atypia and increased basophils blasts are not increased less than 1% moderate reticulin fibrosis.  According to the patient hydroxyurea was discontinued she was prescribed liver 600 mg daily but she has only been taking 400 mg as she cannot find other milligram tablets.  She is already been pump inserted into her pelvic area.  She continues to experience nausea which resulted in her not taking the baclofen she should  Past Medical History:    Diagnosis Date   • Bone pain    • COVID-19 virus infection 2022   • Diabetes mellitus (HCC)    • Extremity pain     Carlin. legs pain   • Leg pain     left leg greater   • Migraine    • Pulmonary embolism (HCC)    • Vision loss     doing surgery       Past Surgical History:   Procedure Laterality Date   • BONE MARROW BIOPSY     • BREAST SURGERY     • BRONCHOSCOPY N/A 2022    Procedure: BRONCHOSCOPY bilateral lung washing;  Surgeon: Charlene Camara MD;  Location: [facility] ENDOSCOPY;  Service: Pulmonary;  Laterality: N/A;  post: rule out infection vs transfusion lung injury   •  SECTION     • CHOLECYSTECTOMY     • EYE SURGERY      laser surgery due  to hemmorage--- 2021-- another surgery  lt eye11/15/21   • RETINAL DETACHMENT SURGERY     • SPINE SURGERY      Lombardi spinal block   • TUBAL ABDOMINAL LIGATION           Current Outpatient Medications:   •  acetaminophen (TYLENOL) 325 MG tablet, 650 mg., Disp: , Rfl:   •  allopurinol (ZYLOPRIM) 300 MG tablet, 300 mg., Disp: , Rfl:   •  dapagliflozin Propanediol (Farxiga) 10 MG tablet, 10 mg., Disp: , Rfl:   •  gabapentin (NEURONTIN) 300 MG capsule, 600 mg., Disp: , Rfl:   •  Insulin Glargine, 1 Unit Dial, (TOUJEO) 300 UNIT/ML solution pen-injector injection,  20 Units 0.2 mL, SubCutaneous, Inj, Daily, 0 Refill(s), Disp: , Rfl:   •  metoprolol succinate XL (TOPROL-XL) 25 MG 24 hr tablet, 25 mg., Disp: , Rfl:   •  oxymetazoline (AFRIN) 0.05 % nasal spray,  2 Spray, Nostrils Both, Spray, BID, 0 Refill(s), Disp: , Rfl:   •  sacubitril-valsartan (Entresto) 24-26 MG tablet,  1 Tab, Oral, Tab, BID, # 60 Tab, 0 Refill(s), Disp: , Rfl:   •  traZODone (DESYREL) 50 MG tablet, 50 mg., Disp: , Rfl:   •  allopurinol (ZYLOPRIM) 100 MG tablet, Take 1 tablet by mouth 2 (Two) Times a Day., Disp: 30 tablet, Rfl: 0  •  ALPRAZolam (Xanax) 0.25 MG tablet, Take 1 tablet by mouth At Night As Needed for Anxiety., Disp: 30 tablet, Rfl: 0  •  budesonide-formoterol (SYMBICORT)  160-4.5 MCG/ACT inhaler, Inhale 2 puffs 2 (Two) Times a Day., Disp: 10.2 g, Rfl: 1  •  carvedilol (COREG) 6.25 MG tablet, Take 1 tablet by mouth 2 (Two) Times a Day With Meals for 30 days., Disp: 60 tablet, Rfl: 0  •  citalopram (CeleXA) 10 MG tablet, Take 1 tablet by mouth Daily. To begin 1/31/22, Disp: 30 tablet, Rfl: 2  •  folic acid (FOLVITE) 1 MG tablet, Take 1 tablet by mouth Daily., Disp: 30 tablet, Rfl: 0  •  furosemide (LASIX) 20 MG tablet, Take 20 mg by mouth Daily., Disp: , Rfl:   •  imatinib (GLEEVEC) 100 MG chemo tablet, Take 2 tablets by mouth Daily., Disp: 60 tablet, Rfl: 2  •  imatinib (GLEEVEC) 400 MG chemo tablet, Take 1 tablet by mouth Daily., Disp: 30 tablet, Rfl: 2  •  Insulin Glargine (BASAGLAR KWIKPEN) 100 UNIT/ML injection pen, Inject 20 Units under the skin into the appropriate area as directed Daily., Disp: 10 mL, Rfl: 3  •  Insulin Lispro (ADMELOG SOLOSTAR) 100 UNIT/ML injection pen, Inject 6 Units under the skin into the appropriate area as directed 3 (Three) Times a Day., Disp: 3 mL, Rfl: 3  •  losartan (COZAAR) 25 MG tablet, Take 1 tablet by mouth Daily., Disp: 30 tablet, Rfl: 0  •  mirtazapine (REMERON) 15 MG tablet, Take 1 tablet by mouth Every Night for 30 days., Disp: 30 tablet, Rfl: 0  •  ondansetron ODT (ZOFRAN-ODT) 8 MG disintegrating tablet, Place 1 tablet on the tongue Every 8 (Eight) Hours As Needed for Nausea or Vomiting., Disp: 20 tablet, Rfl: 2  •  promethazine (PHENERGAN) 12.5 MG tablet, TAKE 1 TABLET BY MOUTH EVERY 4 HOURS as needed FOR nausea and/or vomiting, Disp: , Rfl:     Allergies   Allergen Reactions   • Hydromorphone GI Intolerance     dilaudid   • Morphine Nausea And Vomiting   • Propofol Hives     Previously tolerated being premedicated with diphenhydramine and famotadine       Family History   Problem Relation Age of Onset   • Diabetes Mother    • Diabetes Maternal Grandmother    • Heart attack Maternal Grandmother    • Stroke Maternal Grandmother         Cancer-related family history is not on file.    Social History     Tobacco Use   • Smoking status: Never   • Smokeless tobacco: Never   Vaping Use   • Vaping Use: Never used   Substance Use Topics   • Alcohol use: No   • Drug use: No     I have reviewed and confirmed the accuracy of the patient's history: Chief complaint, HPI, ROS and Subjective as entered by the MA/LPN/RN. Meghann Leeann Lerma MD 01/08/23     SUBJECTIVE:    She complains of some nausea but no vomiting        REVIEW OF SYSTEMS:    Review of Systems   Constitutional: Negative for chills and fever.   HENT: Negative for ear pain, mouth sores, nosebleeds and sore throat.    Eyes: Negative for photophobia and visual disturbance.   Respiratory: Negative for wheezing and stridor.    Cardiovascular: Negative for chest pain and palpitations.   Gastrointestinal: Negative for abdominal pain, diarrhea, nausea and vomiting.   Endocrine: Negative for cold intolerance and heat intolerance.   Genitourinary: Negative for dysuria and hematuria.   Musculoskeletal: Negative for joint swelling and neck stiffness.   Skin: Negative for color change and rash.   Neurological: Negative for seizures and syncope.   Hematological: Negative for adenopathy.        No obvious bleeding   Psychiatric/Behavioral: Negative for agitation, confusion and hallucinations.       OBJECTIVE:    Vitals:    01/05/23 1109   BP: 115/74   Pulse: 101   Resp: 20   Temp: 96.9 °F (36.1 °C)   TempSrc: Infrared   SpO2: 99%   Weight: 62.6 kg (138 lb)   Height: 162.6 cm (64\")   PainSc: 0-No pain     Body mass index is 23.69 kg/m².    ECOG  (1) Restricted in physically strenuous activity, ambulatory and able to do work of light nature    Physical Exam  Vitals and nursing note reviewed.   Constitutional:       General: She is not in acute distress.     Appearance: She is not diaphoretic.   HENT:      Head: Normocephalic and atraumatic.   Eyes:      General: No scleral icterus.        Right eye: No  discharge.         Left eye: No discharge.      Conjunctiva/sclera: Conjunctivae normal.   Neck:      Thyroid: No thyromegaly.   Cardiovascular:      Rate and Rhythm: Normal rate and regular rhythm.      Heart sounds: Normal heart sounds. No friction rub. No gallop.    Pulmonary:      Effort: Pulmonary effort is normal. No respiratory distress.      Breath sounds: No stridor. No wheezing.   Abdominal:      General: Bowel sounds are normal.      Palpations: Abdomen is soft. There is no mass.      Tenderness: There is no abdominal tenderness. There is no guarding or rebound.   Musculoskeletal:         General: No tenderness. Normal range of motion.      Cervical back: Normal range of motion and neck supple.   Lymphadenopathy:      Cervical: No cervical adenopathy.   Skin:     General: Skin is warm.      Findings: No erythema or rash.   Neurological:      Mental Status: She is alert and oriented to person, place, and time.      Motor: No abnormal muscle tone.   Psychiatric:         Behavior: Behavior normal.     She has a pimple on the left posterior left lumbar paravertebral area    I have reexamined the patient and the results are consistent with the previously documented exam. Meghann Leeann Lerma MD     RECENT LABS    WBC   Date Value Ref Range Status   01/05/2023 20.00 (H) 3.40 - 10.80 10*3/mm3 Final     RBC   Date Value Ref Range Status   01/05/2023 3.95 3.77 - 5.28 10*6/mm3 Final     Hemoglobin   Date Value Ref Range Status   01/05/2023 10.4 (L) 12.0 - 15.9 g/dL Final     Hematocrit   Date Value Ref Range Status   01/05/2023 35.1 34.0 - 46.6 % Final     MCV   Date Value Ref Range Status   01/05/2023 88.9 79.0 - 97.0 fL Final     MCH   Date Value Ref Range Status   01/05/2023 26.3 (L) 26.6 - 33.0 pg Final     MCHC   Date Value Ref Range Status   01/05/2023 29.6 (L) 31.5 - 35.7 g/dL Final     RDW   Date Value Ref Range Status   01/05/2023 22.1 (H) 12.3 - 15.4 % Final     RDW-SD   Date Value Ref Range Status    01/05/2023 67.8 (H) 37.0 - 54.0 fl Final     MPV   Date Value Ref Range Status   01/05/2023 9.3 6.0 - 12.0 fL Final     Platelets   Date Value Ref Range Status   01/05/2023 1,386 (C) 140 - 450 10*3/mm3 Final     Neutrophil %   Date Value Ref Range Status   10/16/2019 60.0 42.7 - 76.0 % Final     Lymphocyte %   Date Value Ref Range Status   10/16/2019 32.1 19.6 - 45.3 % Final     Monocyte %   Date Value Ref Range Status   01/05/2023 11.1 5.0 - 12.0 % Final     Eosinophil %   Date Value Ref Range Status   01/05/2023 1.0 0.3 - 6.2 % Final     Basophil %   Date Value Ref Range Status   10/16/2019 1.3 0.0 - 1.5 % Final     External Neutrophils Abs   Date Value Ref Range Status   11/30/2022 7.6 (H) 1.7 - 6.0 x10(3)/ul Final     Lymphocytes, Absolute   Date Value Ref Range Status   10/16/2019 3.00 0.70 - 3.10 10*3/mm3 Final     Monocytes, Absolute   Date Value Ref Range Status   01/05/2023 2.21 (H) 0.10 - 0.90 10*3/mm3 Final     Eosinophils, Absolute   Date Value Ref Range Status   01/05/2023 0.19 0.00 - 0.40 10*3/mm3 Final     Basophils Absolute   Date Value Ref Range Status   11/16/2022 21.34 (H) 0.00 - 0.20 10*3/mm3 Final     Basophils, Absolute   Date Value Ref Range Status   10/16/2019 0.10 0.00 - 0.20 10*3/mm3 Final     nRBC   Date Value Ref Range Status   11/11/2022 2.0 (H) 0.0 - 0.2 /100 WBC Final   10/16/2019 0.2 0.0 - 0.2 /100 WBC Final       Lab Results   Component Value Date    GLUCOSE 241 (H) 01/05/2023    BUN 25 (H) 01/05/2023    CREATININE 1.08 (H) 01/05/2023    EGFRIFNONA 133 02/02/2022    BCR 23.1 01/05/2023    K 4.3 01/05/2023    CO2 28.0 01/05/2023    CALCIUM 9.0 01/05/2023    ALBUMIN 4.0 01/05/2023    LABIL2 1.0 04/18/2019    AST 27 01/05/2023    ALT 18 01/05/2023           ASSESSMENT    · Accelerated phase CML, status post bone marrow biopsy on 11/3/2022.  Currently on Gleevec.  Would resume current dose of 600 mg daily  · She still has significant thrombocytosis up to 1 million, will add low-dose  hydroxyurea 500 mg twice a day with close monitoring of her CBCs  · We will request for her additional records from [facility]  · Pain management, status post pain pump insertion.  She will follow-up with pain management at [facility] or [facility]  · Noncompliant with TKI's for her CML.  Reviewed  · Recent cardiomyopathy diagnosis with EF around 26 to 30% nonischemic.  Follows up with Dr. Reynolds with cardiology services  · CML in chronic phase with no significant hematologic or molecular or cytogenetic response on   Imatinib.  ABL kinase mutational analysis was negative.  We  have represcribed to Tasigna 300 mg twice a day.  Patient has been noncompliant with treatments and ended up in the hospital with hyperleukocytosis, peripheral blood blasts.  Bone marrow biopsy suggest that she remains in chronic phase.  Continue to Tasiigna at the current doses.  Hydroxyurea has been discontinued.  · Uncontrolled diabetes type 1, followed at the [facility] by Dr. Calles  · Chronic anemia: Stable, multifactorial from CML, to Tasigna, hydroxyurea.  This has improved  · Insomnia  · Situational anxiety, not suicidal, Zoloft is not helping  · Hyperleukocytosis secondary to CML  · History of medical noncompliance  · Pulmonary embolism: Xarelto restarted  · Recent COVID-19 pneumonia  · History of prolonged QTC        Plans    · Continue Gleevec 600 mg p.o. daily  · Restart hydroxyurea for thrombocytosis 500 mg twice a day  · Weekly labs CBC and CMP  · Request additional records from [facility]  · Informed patient that she will be seen on a weekly basis for now  · Weaned off Elavil since anxiety not improved  · Discontinued Celexa 10 mg p.o. every morning due to prolonged QTC.  · Reviewed her EKG completed on 3/28/2022  · Continue Xanax 0.25 mg p.o. nightly as needed number 30 pills.  Will refill today.  · Follow-up with me in 4 weeks or earlier as needed  · Monitor  CBC closely  • Continue mirtazapine to 15 mg p.o. nightly for insomnia  • Continue antiemetics  • GI consult for continued nausea and vomiting (this was present prior to TKI)-this is pending  • Last EKG showed normal QT  • Continue Xanax 0.25 mg p.o. nightly as needed number 30 pills.  Refill sent today.  • All questions answered        Follow-up with pain management for poorly controlled pain.  She has an appointment today..      I spent 45 total minutes, face-to-face, caring for Hope today.  90% of this time involved counseling and/or coordination of care as documented within this note.

## 2023-01-06 ENCOUNTER — SPECIALTY PHARMACY (OUTPATIENT)
Dept: PHARMACY | Facility: HOSPITAL | Age: 48
End: 2023-01-06
Payer: MEDICARE

## 2023-01-06 NOTE — PROGRESS NOTES
Specialty Pharmacy Note: Imatinib        1/5/2023   WBC 3.40 - 10.80 10*3/mm3 20.00 (A)   Hemoglobin 12.0 - 15.9 g/dL 10.4 (A)   Hematocrit 34.0 - 46.6 % 35.1   Platelets 140 - 450 10*3/mm3 1,386 (A)   Creatinine 0.57 - 1.00 mg/dL 1.08 (A)   BUN 6 - 20 mg/dL 25 (A)   Sodium 136 - 145 mmol/L 141   Potassium 3.5 - 5.2 mmol/L 4.3   Glucose 65 - 99 mg/dL 241 (A)   Calcium 8.6 - 10.5 mg/dL 9.0   Albumin 3.5 - 5.2 g/dL 4.0   Total Protein 6.0 - 8.5 g/dL 7.1   AST (SGOT) 1 - 32 U/L 27   ALT (SGPT) 1 - 33 U/L 18   Alkaline Phosphatase 39 - 117 U/L 836 (A)   Total Bilirubin 0.0 - 1.2 mg/dL 1.1     [facility] has tried to refill pt's hydroxyurea but unable to reach pt to refill. Pt has been only taking imatinib 400 mg (not 600 mg) daily due to medication supply left at [facility] during her hospital admission. Called [facility] and confirmed medication supply was still at hospital. Per [facility] In[facility], pt or mom can  medication during normal business hours. Hospital unable to mail or send med to [facility]. Called and notified patient's mom.  Pharmacy will continue to follow.      Thank you,  Amy Aldridge, Pharm.D.

## 2023-01-06 NOTE — PROGRESS NOTES
MTM Encounter-Re: Adherence and side effects (imatinib)    Today's encounter was conducted in person, face-to-face.     Medication:  Imatinib (Gleevec) 600mg PO Daily  - Reason for outreach: Routine medication check-in  and patient was recently discharged from [facility] so medication recincilliation needed.  - Administration: Patient is taking every day at the same time  and on an empty stomach .  - Missed doses: Patient reports missing 0 doses in the last 30 days.  - Self-administration: Barriers to self administration/adherence identified: Nieves has historically been non-compliant with medication.  She is now living with her mother and her mother is assisting with medications. Methods for Supporting Patient Self-Administration/Adherence: Nieves is living with her mother and her mother reports that she is assisting with administration.  She reports that [facility] doctors told her that if she did not take the medication she would have shortened life span so she is motivated to take her medication..    - Diagnosis/Indication: CML. Progress toward achieving therapeutic goals reviewed.   - Patient is experiencing side effects of nausea and vomiting, anxiety, and insomnia.  Nieves reports that nausea is controlled with ondansetron.  She requested increased dose of Xanax to help her sleep.  Doscussed with Dr. Lerma and she will address when she sees her today..    - Medication availability/affordability: Patient has had no issues obtaining medication from pharmacy, She does report that home supply was taken to [facility] to be used during her stay and they lost it.  She therefore has only been taking 400mg daily of imatinib due to not having the 100mg tablets.  Care Cordinator is going to attempt to contact [facility] for remaining supply..   - Questions/concerns about medications: She is not taking as prescribed due to above.       Completed medication reconciliation today to assess for drug interactions.   Reviewed allergies, medical  history, labs, quality of life, and medication history with patient.   Patient has had the following changes to medication list: she stopped allopurinol, dapagliflozin, Hydrea, gabapentin, losartan, and Entresto; patient's chart has been updated to reflect changes. Assessed medication list for interactions, no significant drug interactions noted.   Advised pt to call the clinic if any new medications are started so we can assess for drug-drug interactions.     All questions addressed. Patient had no additional concerns for MTM office.     Ros Martinez  1/6/2023  09:50 EST

## 2023-01-08 DIAGNOSIS — C95.90 LEUKEMIA NOT HAVING ACHIEVED REMISSION, UNSPECIFIED LEUKEMIA TYPE: ICD-10-CM

## 2023-01-08 DIAGNOSIS — C92.10 BLAST CRISIS PHASE OF CHRONIC MYELOID LEUKEMIA: Primary | ICD-10-CM

## 2023-01-09 ENCOUNTER — SPECIALTY PHARMACY (OUTPATIENT)
Dept: PHARMACY | Facility: HOSPITAL | Age: 48
End: 2023-01-09
Payer: MEDICARE

## 2023-01-09 DIAGNOSIS — C95.90 LEUKEMIA NOT HAVING ACHIEVED REMISSION, UNSPECIFIED LEUKEMIA TYPE: ICD-10-CM

## 2023-01-09 DIAGNOSIS — C92.10 BLAST CRISIS PHASE OF CHRONIC MYELOID LEUKEMIA: Primary | ICD-10-CM

## 2023-01-09 RX ORDER — IMATINIB MESYLATE 400 MG/1
400 TABLET, FILM COATED ORAL DAILY
Qty: 30 TABLET | Refills: 2 | Status: SHIPPED | OUTPATIENT
Start: 2023-01-09 | End: 2023-03-15

## 2023-01-09 RX ORDER — IMATINIB MESYLATE 100 MG/1
200 TABLET, FILM COATED ORAL DAILY
Qty: 60 TABLET | Refills: 2 | Status: SHIPPED | OUTPATIENT
Start: 2023-01-09 | End: 2023-03-15

## 2023-01-09 RX ORDER — HYDROXYUREA 500 MG/1
500 CAPSULE ORAL 2 TIMES DAILY
COMMUNITY
End: 2023-02-13

## 2023-01-09 RX ORDER — HYDROXYUREA 500 MG/1
500 CAPSULE ORAL 2 TIMES DAILY
Qty: 60 CAPSULE | Refills: 3 | COMMUNITY
Start: 2023-01-09 | End: 2023-02-13 | Stop reason: SDUPTHER

## 2023-01-09 NOTE — PROGRESS NOTES
Specialty Pharmacy Note: Medication Follow Up    Confirmed with [facility] pharmacy that Hope picked up her RX for Hydrea on 1/5/23 for a 30 days supply with a cost of $15.  Prescription for Gleevec is being sent to [facility].  Based on there test claim, they have funding available for copay assistance. Pharmacy will continue to follow.        Thanks,    Ros CALHOUN, PharmD

## 2023-01-11 ENCOUNTER — SPECIALTY PHARMACY (OUTPATIENT)
Dept: PHARMACY | Facility: HOSPITAL | Age: 48
End: 2023-01-11
Payer: MEDICARE

## 2023-01-11 NOTE — PROGRESS NOTES
Specialty Pharmacy Note: Gleevec    Spoke with Nieves's mother, Sayra regarding Gleevec.  Nieves told her that we called requesting her 's financial information for her medication.  Let Sayra know that we did not call, but likely [id number] called to try and get copay assistance.  Sayra is requesting [id number] contact her, as it is upsetting Hope to deal with this.  She did report that Nieves's  is not currently in contact with her and is not financially supporting her.  Contacted [id number] to see if they are able to send needed information to our office so that we could assist Nieves in filling out at her appointment on 1/12/23.  I also did confirm with Sayra that patient was able to get her home supply left at [facility] when whe was discharged and is currently taking medication as prescribed.      Thanks,    Ros CALHOUN, PharmD

## 2023-01-11 NOTE — PROGRESS NOTES
Hematology/Oncology Outpatient Follow Up    PATIENT NAME:Nieves Mc  :1975  MRN: [id number]  PRIMARY CARE PHYSICIAN: Alida Latham APRN  REFERRING PHYSICIAN: Alida Latham, *    Chief Complaint   Patient presents with   • Follow-up     Leukemia not having achieved remission, unspecified leukemia type (HCC)        HISTORY OF PRESENT ILLNESS:      Patient is a 47 y.o. female with PMH significant for CML on treatment with Imatinib.  Patient stated that she typically feels poorly with Imatinib with frequent nausea and vomiting.  Also complained of weakness and fatigue.  Patient presented to ED on 10/22/18 with complaints of an elevated blood sugar around 400.  Stated she felt poorly and has had a productive cough with yellow sputum.  Complained of nausea and stated she was unable to keep any food down anytime she ate.  The patient reported subjective fever without chills.  She stated she had been coughing so hard that she was vomiting.  She denied hematemesis.  Denied diarrhea, constipation or blood in her stool.       Patient’s chest x-ray showed no evidence of acute pulmonary embolus.  The patient has ground-glass opacities throughout the lungs.  There is some air trapping noted which would appear to be   infectious.  Patient was started on antibiotics.      Hem/Onc was consulted on 10/23/18 for co-management of patient with CML.  Patient was seen by Dr. Lerma on 10/12 on previous admission for patient reported CML on Imatinib (Gleevec).  Patient reports she is under the care of Dr. Roach at [facility] in [place].  Patient was seen by Dr. Lerma in May 2018 when patient presented with hematuria, which was attributed to her Imatinib.  Dr. Lerma followed during hospitalization but then patient returned to Dr. Roach for her usual follow up.  She was again seen on 10/12.  Patient is stating she will switch to Dr. Lerma.    · Review of Dr. Roach’s note:  On 18 patient  had BCR-abl which was 61.326.  Patient had been on Imatinib 400 mg daily.  She had presented in March 2018 with WBC of 24, hemoglobin 13.1, platelet count of 1,067,000.  Patient apparently had follow up BCR-abl in August 2018, results are not available for review.  Her bone marrow aspiration and biopsy was also performed at that time is currently not available for review.    · 11/6/18 - .  Uric acid 6.5.  BCR-abl by RT-PCR was measured at 3.36%.    · Due to thrombocytosis, patient was placed on Hydroxyurea 500 mg twice a day on 12/11/18.  Platelet count juana to 900,000.  Patient was noncompliant with CBCs that were recommended.    Patient was asked to return to the office after not being able to reach her.   · 12/27/18 - Bone marrow aspiration and biopsy was consistent with chronic myeloid leukemia.  BCR-abl positive, chronic phase.  ABL kinase mutational analysis is currently pending on the bone marrow.    · 1/3/19 - Repeat CBC, WBC 17, hemoglobin 11.9, platelet count 1,072,000.  Hydroxyurea was increased to 1 gm twice a day with follow up CBC.    · 1/6/19 - Follow up CBC showed progressive increase in platelet to 1.1 million.  Patient was offered admission to the hospital, which she declined.  Hydroxyurea was increased to 1 gm three   times a day as of 1/6/19.   · 1/7/19 - EKG shows sinus tachycardia.   milliseconds.   ·  ABL kinase on peripheral blood was ordered on 1/11/19.  Based on sequence analysis, there was no mutation detected.    · 2/12/19 - Patient was initiated on Tasigna 300 mg twice a day.  · 2/13/19 - Urinalysis was negative for hematuria.   · 2/22/19 -  milliseconds.  · 3/13/19 - EKG with QTC is 413 milliseconds.  Nonspecific changes noted.    · 4/18/19 - EKG showed QTC prolonged to 453 milliseconds.  This is changed from prior.  · 4/18/19 - Free T4 normal at 0.91.  Magnesium was 1.7.  BUN is 6.  Creatinine 0.7.  Bilirubin 2.2.  Phosphorous normal 3.7.  TSH 0.43, normal.  Free  T3 was normal at 3.47.  Ionized calcium   normal at 1.23.  BCR-abl was 0.522%.    · 5/7/19 - EKG showed QTC of 441 milliseconds.  QT was 366.     · Patient has been lost to follow-up since 2019 for CML.  Patient states that she lost her insurance.  She also does not have any primary care physician at this time.  She is diabetic on insulin.  · Patient was recently admitted to the hospital for pneumonia due to COVID-19 infection.  At that time patient made a decision to become compliant with treatment.  She is currently on to [employer]a 300 mg p.o. twice a day.  She is also on hydroxyurea 1 g twice a day.  Overall she feels better, she has more energy.  · 3/1/2022 white count is 53.92, hemoglobin 11.7 and platelets are 472    · 6/1/2022: Patient has not been seen since March 2022.  Also verified that she has not been taking to Tasigna since February 2022.  She comes in today with cough for 1 and half months, shortness of breath, denies fevers.  · 6/30/2022 patient had 2D echocardiogram which showed an EF of 41 to 45%.  Left ventricular cavity is mildly dilated.  She was diagnosed with nonischemic cardiomyopathy    · 11/18/2022: Patient was transferred from [facility] [place] to [facility] due to cardiac failure.  She was then seen by NP [facility] hematology department.  Patient underwent tumor aspiration and biopsy at that time did not show chronic myeloid leukemia in accelerated phase markedly hypercellular marrow with megakaryocytic and myeloid hyperplasia with atypia and increased basophils blasts are not increased less than 1% moderate reticulin fibrosis.  According to the patient hydroxyurea was discontinued she was prescribed Gleevec 600 mg daily but she has only been taking 400 mg as she cannot find other milligram tablets.  She is already been pump inserted into her pelvic area.  She continues to experience nausea which resulted in her not taking the baclofen she should.  · 1/12/2023: WBC 17.64,  hemoglobin 10.2, MCV 88.8, platelets 458,000.  On Gleevec 600 mg daily and hydroxyurea 500 mg twice daily.    Past Medical History:   Diagnosis Date   • Bone pain    • COVID-19 virus infection 2022   • Diabetes mellitus (HCC)    • Extremity pain     Carlin. legs pain   • Leg pain     left leg greater   • Migraine    • Pulmonary embolism (HCC)    • Vision loss     doing surgery       Past Surgical History:   Procedure Laterality Date   • BONE MARROW BIOPSY     • BREAST SURGERY     • BRONCHOSCOPY N/A 2022    Procedure: BRONCHOSCOPY bilateral lung washing;  Surgeon: Charlene Camara MD;  Location: [facility] ENDOSCOPY;  Service: Pulmonary;  Laterality: N/A;  post: rule out infection vs transfusion lung injury   •  SECTION     • CHOLECYSTECTOMY     • EYE SURGERY      laser surgery due  to hemmorage--- 2021-- another surgery  lt eye11/15/21   • RETINAL DETACHMENT SURGERY     • SPINE SURGERY      Lombardi spinal block   • TUBAL ABDOMINAL LIGATION           Current Outpatient Medications:   •  acetaminophen (TYLENOL) 325 MG tablet, 650 mg., Disp: , Rfl:   •  allopurinol (ZYLOPRIM) 100 MG tablet, Take 1 tablet by mouth 2 (Two) Times a Day., Disp: 30 tablet, Rfl: 0  •  allopurinol (ZYLOPRIM) 300 MG tablet, 300 mg., Disp: , Rfl:   •  ALPRAZolam (Xanax) 0.25 MG tablet, Take 1 tablet by mouth At Night As Needed for Anxiety., Disp: 30 tablet, Rfl: 0  •  budesonide-formoterol (SYMBICORT) 160-4.5 MCG/ACT inhaler, Inhale 2 puffs 2 (Two) Times a Day., Disp: 10.2 g, Rfl: 1  •  citalopram (CeleXA) 10 MG tablet, Take 1 tablet by mouth Daily. To begin 22, Disp: 30 tablet, Rfl: 2  •  dapagliflozin Propanediol (Farxiga) 10 MG tablet, 10 mg., Disp: , Rfl:   •  folic acid (FOLVITE) 1 MG tablet, Take 1 tablet by mouth Daily., Disp: 30 tablet, Rfl: 0  •  furosemide (LASIX) 20 MG tablet, Take 20 mg by mouth Daily., Disp: , Rfl:   •  gabapentin (NEURONTIN) 300 MG capsule, 600 mg., Disp: , Rfl:   •  hydroxyurea (HYDREA) 500 MG  capsule, Take 500 mg by mouth 2 (Two) Times a Day., Disp: 60 capsule, Rfl: 3  •  hydroxyurea (HYDREA) 500 MG capsule, Take 500 mg by mouth 2 (Two) Times a Day. Starting 1/5, Disp: , Rfl:   •  hydrOXYzine (ATARAX) 25 MG tablet, Take 25 mg by mouth Every 8 (Eight) Hours As Needed., Disp: , Rfl:   •  imatinib (GLEEVEC) 100 MG chemo tablet, Take 2 tablets by mouth with 1 other imatinib prescription for 600 mg total Daily. Take with food and large glass of water; Do not take with Grapefruit juice., Disp: 60 tablet, Rfl: 2  •  imatinib (GLEEVEC) 400 MG chemo tablet, Take 1 tablet by mouth with 1 other imatinib prescription for 600 mg total Daily. Take with food and large glass of water; Do not take with Grapefruit juice., Disp: 30 tablet, Rfl: 2  •  Insulin Glargine (BASAGLAR KWIKPEN) 100 UNIT/ML injection pen, Inject 20 Units under the skin into the appropriate area as directed Daily., Disp: 10 mL, Rfl: 3  •  Insulin Glargine, 1 Unit Dial, (TOUJEO) 300 UNIT/ML solution pen-injector injection,  20 Units 0.2 mL, SubCutaneous, Inj, Daily, 0 Refill(s), Disp: , Rfl:   •  Insulin Lispro (ADMELOG SOLOSTAR) 100 UNIT/ML injection pen, Inject 6 Units under the skin into the appropriate area as directed 3 (Three) Times a Day., Disp: 3 mL, Rfl: 3  •  losartan (COZAAR) 25 MG tablet, Take 1 tablet by mouth Daily., Disp: 30 tablet, Rfl: 0  •  metoprolol succinate XL (TOPROL-XL) 25 MG 24 hr tablet, 25 mg., Disp: , Rfl:   •  ondansetron ODT (ZOFRAN-ODT) 8 MG disintegrating tablet, Place 1 tablet on the tongue Every 8 (Eight) Hours As Needed for Nausea or Vomiting., Disp: 20 tablet, Rfl: 2  •  oxymetazoline (AFRIN) 0.05 % nasal spray,  2 Spray, Nostrils Both, Spray, BID, 0 Refill(s), Disp: , Rfl:   •  promethazine (PHENERGAN) 12.5 MG tablet, TAKE 1 TABLET BY MOUTH EVERY 4 HOURS as needed FOR nausea and/or vomiting, Disp: , Rfl:   •  sacubitril-valsartan (Entresto) 24-26 MG tablet,  1 Tab, Oral, Tab, BID, # 60 Tab, 0 Refill(s), Disp: ,  Rfl:   •  carvedilol (COREG) 6.25 MG tablet, Take 1 tablet by mouth 2 (Two) Times a Day With Meals for 30 days., Disp: 60 tablet, Rfl: 0    Allergies   Allergen Reactions   • Hydromorphone GI Intolerance     dilaudid   • Morphine Nausea And Vomiting   • Propofol Hives     Previously tolerated being premedicated with diphenhydramine and famotadine       Family History   Problem Relation Age of Onset   • Diabetes Mother    • Diabetes Maternal Grandmother    • Heart attack Maternal Grandmother    • Stroke Maternal Grandmother        Cancer-related family history is not on file.    Social History     Tobacco Use   • Smoking status: Never   • Smokeless tobacco: Never   Vaping Use   • Vaping Use: Never used   Substance Use Topics   • Alcohol use: No   • Drug use: No     I have reviewed and confirmed the accuracy of the patient's history: Chief complaint, HPI, ROS and Subjective as entered by the MA/LPN/RN. Denisha Gill, APRN 01/12/23     SUBJECTIVE:  Patient here for routine follow-up.  She is accompanied by her mother at the visit.  She reports that she was able to obtain her 100 mg tablets of Gleevec from [facility] and has been taking 600 mg total daily now for 5 days.  She is tolerating this okay.  She does have nausea and she does take her nausea medication routinely to control it.  She is tolerating hydroxyurea without any issue.  She has much improved pain control now that she has a pain pump in place.  She has increased her activity a great deal and is doing light housework taking care of her daughter, making meals which is something that she had not been able to do for a long time.        REVIEW OF SYSTEMS:    Review of Systems   Constitutional: Negative for chills and fever.   HENT: Negative for ear pain, mouth sores, nosebleeds and sore throat.    Eyes: Negative for photophobia and visual disturbance.   Respiratory: Negative for wheezing and stridor.    Cardiovascular: Negative for chest pain and  "palpitations.   Gastrointestinal: Negative for abdominal pain, diarrhea, nausea and vomiting.   Endocrine: Negative for cold intolerance and heat intolerance.   Genitourinary: Negative for dysuria and hematuria.   Musculoskeletal: Negative for joint swelling and neck stiffness.   Skin: Negative for color change and rash.   Neurological: Negative for seizures and syncope.   Hematological: Negative for adenopathy.        No obvious bleeding   Psychiatric/Behavioral: Negative for agitation, confusion and hallucinations.       OBJECTIVE:    Vitals:    01/12/23 1444   BP: 125/81   Pulse: 109   Temp: 98.1 °F (36.7 °C)   TempSrc: Oral   SpO2: 99%   Weight: 65 kg (143 lb 6.4 oz)   Height: 162.6 cm (64\")   PainSc: 0-No pain     Body mass index is 24.61 kg/m².    ECOG  (1) Restricted in physically strenuous activity, ambulatory and able to do work of light nature    Physical Exam  Vitals and nursing note reviewed.   Constitutional:       General: She is not in acute distress.     Appearance: She is not diaphoretic.   HENT:      Head: Normocephalic and atraumatic.   Eyes:      General: No scleral icterus.        Right eye: No discharge.         Left eye: No discharge.      Conjunctiva/sclera: Conjunctivae normal.   Neck:      Thyroid: No thyromegaly.   Cardiovascular:      Rate and Rhythm: Normal rate and regular rhythm.      Heart sounds: Normal heart sounds. No friction rub. No gallop.    Pulmonary:      Effort: Pulmonary effort is normal. No respiratory distress.      Breath sounds: No stridor. No wheezing.   Abdominal:      General: Bowel sounds are normal.      Palpations: Abdomen is soft. There is no mass.      Tenderness: There is no abdominal tenderness. There is no guarding or rebound.   Musculoskeletal:         General: No tenderness. Normal range of motion.      Cervical back: Normal range of motion and neck supple.   Lymphadenopathy:      Cervical: No cervical adenopathy.   Skin:     General: Skin is warm.      " Findings: No erythema or rash.   Neurological:      Mental Status: She is alert and oriented to person, place, and time.      Motor: No abnormal muscle tone.   Psychiatric:         Behavior: Behavior normal.       I have reexamined the patient and the results are consistent with the previously documented exam. JP Varela     RECENT LABS    WBC   Date Value Ref Range Status   01/12/2023 17.64 (H) 3.40 - 10.80 10*3/mm3 Final     RBC   Date Value Ref Range Status   01/12/2023 3.84 3.77 - 5.28 10*6/mm3 Final     Hemoglobin   Date Value Ref Range Status   01/12/2023 10.2 (L) 12.0 - 15.9 g/dL Final     Hematocrit   Date Value Ref Range Status   01/12/2023 34.1 34.0 - 46.6 % Final     MCV   Date Value Ref Range Status   01/12/2023 88.8 79.0 - 97.0 fL Final     MCH   Date Value Ref Range Status   01/12/2023 26.6 26.6 - 33.0 pg Final     MCHC   Date Value Ref Range Status   01/12/2023 29.9 (L) 31.5 - 35.7 g/dL Final     RDW   Date Value Ref Range Status   01/12/2023 20.5 (H) 12.3 - 15.4 % Final     RDW-SD   Date Value Ref Range Status   01/12/2023 63.9 (H) 37.0 - 54.0 fl Final     MPV   Date Value Ref Range Status   01/12/2023 10.3 6.0 - 12.0 fL Final     Platelets   Date Value Ref Range Status   01/12/2023 458 (H) 140 - 450 10*3/mm3 Final     Neutrophil %   Date Value Ref Range Status   10/16/2019 60.0 42.7 - 76.0 % Final     Lymphocyte %   Date Value Ref Range Status   10/16/2019 32.1 19.6 - 45.3 % Final     Monocyte %   Date Value Ref Range Status   01/12/2023 8.1 5.0 - 12.0 % Final     Eosinophil %   Date Value Ref Range Status   01/12/2023 1.5 0.3 - 6.2 % Final     Basophil %   Date Value Ref Range Status   10/16/2019 1.3 0.0 - 1.5 % Final     External Neutrophils Abs   Date Value Ref Range Status   11/30/2022 7.6 (H) 1.7 - 6.0 x10(3)/ul Final     Lymphocytes, Absolute   Date Value Ref Range Status   10/16/2019 3.00 0.70 - 3.10 10*3/mm3 Final     Monocytes, Absolute   Date Value Ref Range Status    01/12/2023 1.43 (H) 0.10 - 0.90 10*3/mm3 Final     Eosinophils, Absolute   Date Value Ref Range Status   01/12/2023 0.27 0.00 - 0.40 10*3/mm3 Final     Basophils Absolute   Date Value Ref Range Status   11/16/2022 21.34 (H) 0.00 - 0.20 10*3/mm3 Final     Basophils, Absolute   Date Value Ref Range Status   10/16/2019 0.10 0.00 - 0.20 10*3/mm3 Final     nRBC   Date Value Ref Range Status   11/11/2022 2.0 (H) 0.0 - 0.2 /100 WBC Final   10/16/2019 0.2 0.0 - 0.2 /100 WBC Final       Lab Results   Component Value Date    GLUCOSE 269 (H) 01/12/2023    BUN 19 01/12/2023    CREATININE 0.78 01/12/2023    EGFRIFNONA 133 02/02/2022    BCR 24.4 01/12/2023    K 4.7 01/12/2023    CO2 23.0 01/12/2023    CALCIUM 9.2 01/12/2023    ALBUMIN 4.3 01/12/2023    LABIL2 1.0 04/18/2019    AST 37 (H) 01/12/2023    ALT 40 (H) 01/12/2023           ASSESSMENT    · Accelerated phase CML, status post bone marrow biopsy on 11/3/2022.  Currently on Gleevec.  Would resume current dose of 600 mg daily  · She still has significant thrombocytosis up to 1 million, will add low-dose hydroxyurea 500 mg twice a day with close monitoring of her CBCs  · We will request for her additional records from [facility]  · Pain management, status post pain pump insertion.  She will follow-up with pain management at [place] or [facility]  · Noncompliant with TKI's for her CML.  Reviewed  · Recent cardiomyopathy diagnosis with EF around 26 to 30% nonischemic.  Follows up with Dr. Ruiz with cardiology services  · CML in chronic phase with no significant hematologic or molecular or cytogenetic response on   Imatinib.  ABL kinase mutational analysis was negative.  We  have represcribed to Tasigna 300 mg twice a day.  Patient has been noncompliant with treatments and ended up in the hospital with hyperleukocytosis, peripheral blood blasts.  Bone marrow biopsy suggest that she remains in chronic phase.  Continue to Tasiigna at the current  doses.  Hydroxyurea has been discontinued.  · Uncontrolled diabetes type 1, followed at the [facility] by Dr. Calles  · Chronic anemia: Stable, multifactorial from CML, to Tasigna, hydroxyurea.  This has improved  · Insomnia  · Situational anxiety, not suicidal, on Celexa.  Will increase Xanax.  · Hyperleukocytosis secondary to CML  · History of medical noncompliance  · Pulmonary embolism: Xarelto restarted  · Recent COVID-19 pneumonia  · History of prolonged QTC        Plans    · Reviewed CBC with the patient  · Continue Gleevec 600 mg p.o. daily  · Decrease hydroxyurea to 500 mg once a day  · Weekly labs CBC and CMP  · Request additional records from [facility]  · Discussed bone marrow results with Dr. Lerma and she would like BCR ABL1 kinase testing on the bone marrow added.  · Informed patient that she will be seen on a weekly basis for now  · Weaned off Elavil since anxiety not improved  ·  Celexa 10 mg p.o. every morning prescribed by PCP  · Reviewed her EKG completed on 3/28/2022  · Monitor CBC closely  • Continue antiemetics  • GI consult for continued nausea and vomiting (this was present prior to TKI) not yet completed but nausea has improved somewhat  • Last EKG showed normal QT  • Due to worsening anxiety increase Xanax to 0.5 mg p.o. nightly as needed number 30 pills-refill sent today.  • All questions answered  • Continue to follow-up with PCP per routine  • Follow-up with pain management per routine  • Follow-up with nurse practitioner in 1 week and Dr. Lerma in 3 weeks sooner if needed              I spent 40 total minutes, face-to-face, caring for Hope today.  90% of this time involved counseling and/or coordination of care as documented within this note.

## 2023-01-12 ENCOUNTER — LAB (OUTPATIENT)
Dept: LAB | Facility: HOSPITAL | Age: 48
End: 2023-01-12
Payer: MEDICARE

## 2023-01-12 ENCOUNTER — OFFICE VISIT (OUTPATIENT)
Dept: ONCOLOGY | Facility: CLINIC | Age: 48
End: 2023-01-12
Payer: MEDICARE

## 2023-01-12 ENCOUNTER — DOCUMENTATION (OUTPATIENT)
Dept: ONCOLOGY | Facility: CLINIC | Age: 48
End: 2023-01-12
Payer: MEDICARE

## 2023-01-12 VITALS
OXYGEN SATURATION: 99 % | DIASTOLIC BLOOD PRESSURE: 81 MMHG | WEIGHT: 143.4 LBS | TEMPERATURE: 98.1 F | HEIGHT: 64 IN | BODY MASS INDEX: 24.48 KG/M2 | HEART RATE: 109 BPM | SYSTOLIC BLOOD PRESSURE: 125 MMHG

## 2023-01-12 DIAGNOSIS — F41.9 ANXIETY: ICD-10-CM

## 2023-01-12 DIAGNOSIS — C92.10 CML (CHRONIC MYELOCYTIC LEUKEMIA): ICD-10-CM

## 2023-01-12 DIAGNOSIS — C95.90 LEUKEMIA NOT HAVING ACHIEVED REMISSION, UNSPECIFIED LEUKEMIA TYPE: ICD-10-CM

## 2023-01-12 DIAGNOSIS — Z00.00 ROUTINE GENERAL MEDICAL EXAMINATION AT A HEALTH CARE FACILITY: Primary | ICD-10-CM

## 2023-01-12 DIAGNOSIS — C92.10 CML (CHRONIC MYELOCYTIC LEUKEMIA): Primary | ICD-10-CM

## 2023-01-12 DIAGNOSIS — C91.10 CLL (CHRONIC LYMPHOCYTIC LEUKEMIA): ICD-10-CM

## 2023-01-12 LAB
ALBUMIN SERPL-MCNC: 4.3 G/DL (ref 3.5–5.2)
ALBUMIN/GLOB SERPL: 1.2 G/DL
ALP SERPL-CCNC: 749 U/L (ref 39–117)
ALT SERPL W P-5'-P-CCNC: 40 U/L (ref 1–33)
ANION GAP SERPL CALCULATED.3IONS-SCNC: 12 MMOL/L (ref 5–15)
AST SERPL-CCNC: 37 U/L (ref 1–32)
BASOPHILS NFR BLD AUTO: ABNORMAL %
BILIRUB SERPL-MCNC: 1.4 MG/DL (ref 0–1.2)
BUN SERPL-MCNC: 19 MG/DL (ref 6–20)
BUN/CREAT SERPL: 24.4 (ref 7–25)
CALCIUM SPEC-SCNC: 9.2 MG/DL (ref 8.6–10.5)
CHLORIDE SERPL-SCNC: 99 MMOL/L (ref 98–107)
CO2 SERPL-SCNC: 23 MMOL/L (ref 22–29)
CREAT SERPL-MCNC: 0.78 MG/DL (ref 0.57–1)
DEPRECATED RDW RBC AUTO: 63.9 FL (ref 37–54)
EGFRCR SERPLBLD CKD-EPI 2021: 94.4 ML/MIN/1.73
EOSINOPHIL # BLD AUTO: 0.27 10*3/MM3 (ref 0–0.4)
EOSINOPHIL NFR BLD AUTO: 1.5 % (ref 0.3–6.2)
ERYTHROCYTE [DISTWIDTH] IN BLOOD BY AUTOMATED COUNT: 20.5 % (ref 12.3–15.4)
GLOBULIN UR ELPH-MCNC: 3.5 GM/DL
GLUCOSE SERPL-MCNC: 269 MG/DL (ref 65–99)
HCT VFR BLD AUTO: 34.1 % (ref 34–46.6)
HGB BLD-MCNC: 10.2 G/DL (ref 12–15.9)
LYMPHOCYTES NFR BLD AUTO: ABNORMAL %
MCH RBC QN AUTO: 26.6 PG (ref 26.6–33)
MCHC RBC AUTO-ENTMCNC: 29.9 G/DL (ref 31.5–35.7)
MCV RBC AUTO: 88.8 FL (ref 79–97)
MONOCYTES # BLD AUTO: 1.43 10*3/MM3 (ref 0.1–0.9)
MONOCYTES NFR BLD AUTO: 8.1 % (ref 5–12)
NEUTROPHILS NFR BLD AUTO: ABNORMAL %
PLATELET # BLD AUTO: 458 10*3/MM3 (ref 140–450)
PMV BLD AUTO: 10.3 FL (ref 6–12)
POTASSIUM SERPL-SCNC: 4.7 MMOL/L (ref 3.5–5.2)
PROT SERPL-MCNC: 7.8 G/DL (ref 6–8.5)
RBC # BLD AUTO: 3.84 10*6/MM3 (ref 3.77–5.28)
SODIUM SERPL-SCNC: 134 MMOL/L (ref 136–145)
WBC NRBC COR # BLD: 17.64 10*3/MM3 (ref 3.4–10.8)

## 2023-01-12 PROCEDURE — 85025 COMPLETE CBC W/AUTO DIFF WBC: CPT

## 2023-01-12 PROCEDURE — 36415 COLL VENOUS BLD VENIPUNCTURE: CPT

## 2023-01-12 PROCEDURE — 80053 COMPREHEN METABOLIC PANEL: CPT

## 2023-01-12 PROCEDURE — 99215 OFFICE O/P EST HI 40 MIN: CPT | Performed by: NURSE PRACTITIONER

## 2023-01-12 RX ORDER — HYDROXYZINE HYDROCHLORIDE 25 MG/1
25 TABLET, FILM COATED ORAL EVERY 8 HOURS PRN
COMMUNITY
Start: 2023-01-04

## 2023-01-12 RX ORDER — ALPRAZOLAM 0.5 MG/1
0.5 TABLET ORAL NIGHTLY PRN
Qty: 30 TABLET | Refills: 0 | Status: SHIPPED | OUTPATIENT
Start: 2023-01-12 | End: 2023-02-13 | Stop reason: SDUPTHER

## 2023-01-16 ENCOUNTER — TELEPHONE (OUTPATIENT)
Dept: ONCOLOGY | Facility: CLINIC | Age: 48
End: 2023-01-16
Payer: MEDICARE

## 2023-01-16 ENCOUNTER — SPECIALTY PHARMACY (OUTPATIENT)
Dept: PHARMACY | Facility: HOSPITAL | Age: 48
End: 2023-01-16
Payer: MEDICARE

## 2023-01-16 DIAGNOSIS — C92.10 BLAST CRISIS PHASE OF CHRONIC MYELOID LEUKEMIA: Primary | ICD-10-CM

## 2023-01-16 NOTE — TELEPHONE ENCOUNTER
ERIKA FROM [facility] CALLED AND STATED THAT THE TEST CAN'T BE DONE WITH THE BLOOD THAT THEY HAVE.  ARAVIND SCOTT'S NURSE MADE AWARE.

## 2023-01-16 NOTE — PROGRESS NOTES
Specialty Pharmacy Note: Gleevec    Per MD dictation 1/12/23, continue Gleevec 600mg PO qday.  Hope is being treated for blast crisis phase of chronic myeloid leukemia.     Labs as follow:        1/12/2023   WBC 3.40 - 10.80 10*3/mm3 17.64 (A)   Hemoglobin 12.0 - 15.9 g/dL 10.2 (A)   Hematocrit 34.0 - 46.6 % 34.1   Platelets 140 - 450 10*3/mm3 458 (A)   Creatinine 0.57 - 1.00 mg/dL 0.78   BUN 6 - 20 mg/dL 19   Sodium 136 - 145 mmol/L 134 (A)   Potassium 3.5 - 5.2 mmol/L 4.7  Slight hemolysis detected by analyzer. Results may be affected.   Glucose 65 - 99 mg/dL 269 (A)   Calcium 8.6 - 10.5 mg/dL 9.2   Albumin 3.5 - 5.2 g/dL 4.3   Total Protein 6.0 - 8.5 g/dL 7.8   AST (SGOT) 1 - 32 U/L 37 (A)   ALT (SGPT) 1 - 33 U/L 40 (A)   Alkaline Phosphatase 39 - 117 U/L 749 (A)   Total Bilirubin 0.0 - 1.2 mg/dL 1.4 (A)     Recheck labs in one week due to increase in LFTs and tbili. Based on current LFTs and Tbili, no dose adjustment needed. Per UpToDate:  Hepatotoxicity during treatment: If elevations of bilirubin >3 times ULN or transaminases >5 times ULN occur, withhold treatment until bilirubin <1.5 times ULN and transaminases <2.5 times ULN. Resume treatment at a reduced dose as follows (Note: The decision to resume treatment should take into consideration the initial severity of hepatotoxicity)    Thanks,    Ros CALHOUN, PharmD

## 2023-01-18 NOTE — PROGRESS NOTES
Hematology/Oncology Outpatient Follow Up    PATIENT NAME:Nieves Mc  :1975  MRN: [id number]  PRIMARY CARE PHYSICIAN: Alida Latham APRN  REFERRING PHYSICIAN: Alida Latham, *    Chief Complaint   Patient presents with   • Follow-up     Leukemia not having achieved remission, unspecified leukemia type (HCC)        HISTORY OF PRESENT ILLNESS:      Patient is a 47 y.o. female with PMH significant for CML on treatment with Imatinib.  Patient stated that she typically feels poorly with Imatinib with frequent nausea and vomiting.  Also complained of weakness and fatigue.  Patient presented to ED on 10/22/18 with complaints of an elevated blood sugar around 400.  Stated she felt poorly and has had a productive cough with yellow sputum.  Complained of nausea and stated she was unable to keep any food down anytime she ate.  The patient reported subjective fever without chills.  She stated she had been coughing so hard that she was vomiting.  She denied hematemesis.  Denied diarrhea, constipation or blood in her stool.       Patient’s chest x-ray showed no evidence of acute pulmonary embolus.  The patient has ground-glass opacities throughout the lungs.  There is some air trapping noted which would appear to be   infectious.  Patient was started on antibiotics.      Hem/Onc was consulted on 10/23/18 for co-management of patient with CML.  Patient was seen by Dr. Lerma on 10/12 on previous admission for patient reported CML on Imatinib (Gleevec).  Patient reports she is under the care of Dr. Roach at [facility] in [place].  Patient was seen by Dr. Lerma in May 2018 when patient presented with hematuria, which was attributed to her Imatinib.  Dr. Lerma followed during hospitalization but then patient returned to Dr. Roach for her usual follow up.  She was again seen on 10/12.  Patient is stating she will switch to Dr. Lerma.    · Review of Dr. Roach’s note:  On 18 patient  had BCR-abl which was 61.326.  Patient had been on Imatinib 400 mg daily.  She had presented in March 2018 with WBC of 24, hemoglobin 13.1, platelet count of 1,067,000.  Patient apparently had follow up BCR-abl in August 2018, results are not available for review.  Her bone marrow aspiration and biopsy was also performed at that time is currently not available for review.    · 11/6/18 - .  Uric acid 6.5.  BCR-abl by RT-PCR was measured at 3.36%.    · Due to thrombocytosis, patient was placed on Hydroxyurea 500 mg twice a day on 12/11/18.  Platelet count juana to 900,000.  Patient was noncompliant with CBCs that were recommended.    Patient was asked to return to the office after not being able to reach her.   · 12/27/18 - Bone marrow aspiration and biopsy was consistent with chronic myeloid leukemia.  BCR-abl positive, chronic phase.  ABL kinase mutational analysis is currently pending on the bone marrow.    · 1/3/19 - Repeat CBC, WBC 17, hemoglobin 11.9, platelet count 1,072,000.  Hydroxyurea was increased to 1 gm twice a day with follow up CBC.    · 1/6/19 - Follow up CBC showed progressive increase in platelet to 1.1 million.  Patient was offered admission to the hospital, which she declined.  Hydroxyurea was increased to 1 gm three   times a day as of 1/6/19.   · 1/7/19 - EKG shows sinus tachycardia.   milliseconds.   ·  ABL kinase on peripheral blood was ordered on 1/11/19.  Based on sequence analysis, there was no mutation detected.    · 2/12/19 - Patient was initiated on Tasigna 300 mg twice a day.  · 2/13/19 - Urinalysis was negative for hematuria.   · 2/22/19 -  milliseconds.  · 3/13/19 - EKG with QTC is 413 milliseconds.  Nonspecific changes noted.    · 4/18/19 - EKG showed QTC prolonged to 453 milliseconds.  This is changed from prior.  · 4/18/19 - Free T4 normal at 0.91.  Magnesium was 1.7.  BUN is 6.  Creatinine 0.7.  Bilirubin 2.2.  Phosphorous normal 3.7.  TSH 0.43, normal.  Free  T3 was normal at 3.47.  Ionized calcium   normal at 1.23.  BCR-abl was 0.522%.    · 5/7/19 - EKG showed QTC of 441 milliseconds.  QT was 366.     · Patient has been lost to follow-up since 2019 for CML.  Patient states that she lost her insurance.  She also does not have any primary care physician at this time.  She is diabetic on insulin.  · Patient was recently admitted to the hospital for pneumonia due to COVID-19 infection.  At that time patient made a decision to become compliant with treatment.  She is currently on to [employer]a 300 mg p.o. twice a day.  She is also on hydroxyurea 1 g twice a day.  Overall she feels better, she has more energy.  · 3/1/2022 white count is 53.92, hemoglobin 11.7 and platelets are 472    · 6/1/2022: Patient has not been seen since March 2022.  Also verified that she has not been taking to Tasigna since February 2022.  She comes in today with cough for 1 and half months, shortness of breath, denies fevers.  · 6/30/2022 patient had 2D echocardiogram which showed an EF of 41 to 45%.  Left ventricular cavity is mildly dilated.  She was diagnosed with nonischemic cardiomyopathy    · 11/18/2022: Patient was transferred from [facility] to [facility] due to cardiac failure.  She was then seen by [facility] hematology department.  Patient underwent tumor aspiration and biopsy at that time did not show chronic myeloid leukemia in accelerated phase markedly hypercellular marrow with megakaryocytic and myeloid hyperplasia with atypia and increased basophils blasts are not increased less than 1% moderate reticulin fibrosis.  According to the patient hydroxyurea was discontinued she was prescribed Gleevec 600 mg daily but she has only been taking 400 mg as she cannot find other milligram tablets.  She is already been pump inserted into her pelvic area.  She continues to experience nausea which resulted in her not taking the baclofen she should.  · 1/12/2023: WBC 17.64,  hemoglobin 10.2, MCV 88.8, platelets 458,000.  On Gleevec 600 mg daily and hydroxyurea 500 mg twice daily.  · 2023: WBC 10.67, hemoglobin 10.0, MCV 86.4, platelets 370,000.  Patient on Gleevec 600 mg daily and hydroxyurea 500 mg once a day.  Patient instructed at the visit to discontinue her hydroxyurea.    Past Medical History:   Diagnosis Date   • Bone pain    • COVID-19 virus infection 2022   • Diabetes mellitus (HCC)    • Extremity pain     Carlin. legs pain   • Leg pain     left leg greater   • Migraine    • Pulmonary embolism (HCC)    • Vision loss     doing surgery       Past Surgical History:   Procedure Laterality Date   • BONE MARROW BIOPSY     • BREAST SURGERY     • BRONCHOSCOPY N/A 2022    Procedure: BRONCHOSCOPY bilateral lung washing;  Surgeon: Charlene Camara MD;  Location: [facility];  Service: Pulmonary;  Laterality: N/A;  post: rule out infection vs transfusion lung injury   •  SECTION     • CHOLECYSTECTOMY     • EYE SURGERY      laser surgery due  to hemmorage--- 2021-- another surgery  lt eye11/15/21   • RETINAL DETACHMENT SURGERY     • SPINE SURGERY      Lombardi spinal block   • TUBAL ABDOMINAL LIGATION           Current Outpatient Medications:   •  acetaminophen (TYLENOL) 325 MG tablet, 650 mg., Disp: , Rfl:   •  allopurinol (ZYLOPRIM) 100 MG tablet, Take 1 tablet by mouth 2 (Two) Times a Day., Disp: 30 tablet, Rfl: 0  •  allopurinol (ZYLOPRIM) 300 MG tablet, 300 mg., Disp: , Rfl:   •  ALPRAZolam (Xanax) 0.5 MG tablet, Take 1 tablet by mouth At Night As Needed for Anxiety., Disp: 30 tablet, Rfl: 0  •  budesonide-formoterol (SYMBICORT) 160-4.5 MCG/ACT inhaler, Inhale 2 puffs 2 (Two) Times a Day., Disp: 10.2 g, Rfl: 1  •  citalopram (CeleXA) 10 MG tablet, Take 1 tablet by mouth Daily. To begin 22, Disp: 30 tablet, Rfl: 2  •  dapagliflozin Propanediol (Farxiga) 10 MG tablet, 10 mg., Disp: , Rfl:   •  folic acid (FOLVITE) 1 MG tablet, Take 1 tablet by mouth Daily.,  Disp: 30 tablet, Rfl: 0  •  furosemide (LASIX) 20 MG tablet, Take 20 mg by mouth Daily., Disp: , Rfl:   •  gabapentin (NEURONTIN) 300 MG capsule, 600 mg., Disp: , Rfl:   •  hydroxyurea (HYDREA) 500 MG capsule, Take 500 mg by mouth 2 (Two) Times a Day., Disp: 60 capsule, Rfl: 3  •  hydroxyurea (HYDREA) 500 MG capsule, Take 500 mg by mouth 2 (Two) Times a Day. Starting 1/5, Disp: , Rfl:   •  hydrOXYzine (ATARAX) 25 MG tablet, Take 25 mg by mouth Every 8 (Eight) Hours As Needed., Disp: , Rfl:   •  imatinib (GLEEVEC) 100 MG chemo tablet, Take 2 tablets by mouth with 1 other imatinib prescription for 600 mg total Daily. Take with food and large glass of water; Do not take with Grapefruit juice., Disp: 60 tablet, Rfl: 2  •  imatinib (GLEEVEC) 400 MG chemo tablet, Take 1 tablet by mouth with 1 other imatinib prescription for 600 mg total Daily. Take with food and large glass of water; Do not take with Grapefruit juice., Disp: 30 tablet, Rfl: 2  •  Insulin Glargine (BASAGLAR KWIKPEN) 100 UNIT/ML injection pen, Inject 20 Units under the skin into the appropriate area as directed Daily., Disp: 10 mL, Rfl: 3  •  Insulin Glargine, 1 Unit Dial, (TOUJEO) 300 UNIT/ML solution pen-injector injection,  20 Units 0.2 mL, SubCutaneous, Inj, Daily, 0 Refill(s), Disp: , Rfl:   •  Insulin Lispro (ADMELOG SOLOSTAR) 100 UNIT/ML injection pen, Inject 6 Units under the skin into the appropriate area as directed 3 (Three) Times a Day., Disp: 3 mL, Rfl: 3  •  losartan (COZAAR) 25 MG tablet, Take 1 tablet by mouth Daily., Disp: 30 tablet, Rfl: 0  •  metoprolol succinate XL (TOPROL-XL) 25 MG 24 hr tablet, 25 mg., Disp: , Rfl:   •  ondansetron ODT (ZOFRAN-ODT) 8 MG disintegrating tablet, Place 1 tablet on the tongue Every 8 (Eight) Hours As Needed for Nausea or Vomiting., Disp: 20 tablet, Rfl: 2  •  oxymetazoline (AFRIN) 0.05 % nasal spray,  2 Spray, Nostrils Both, Spray, BID, 0 Refill(s), Disp: , Rfl:   •  promethazine (PHENERGAN) 12.5 MG tablet,  TAKE 1 TABLET BY MOUTH EVERY 4 HOURS as needed FOR nausea and/or vomiting, Disp: , Rfl:   •  sacubitril-valsartan (Entresto) 24-26 MG tablet,  1 Tab, Oral, Tab, BID, # 60 Tab, 0 Refill(s), Disp: , Rfl:   •  carvedilol (COREG) 6.25 MG tablet, Take 1 tablet by mouth 2 (Two) Times a Day With Meals for 30 days., Disp: 60 tablet, Rfl: 0    Allergies   Allergen Reactions   • Hydromorphone GI Intolerance     dilaudid   • Morphine Nausea And Vomiting   • Propofol Hives     Previously tolerated being premedicated with diphenhydramine and famotadine       Family History   Problem Relation Age of Onset   • Diabetes Mother    • Diabetes Maternal Grandmother    • Heart attack Maternal Grandmother    • Stroke Maternal Grandmother        Cancer-related family history is not on file.    Social History     Tobacco Use   • Smoking status: Never   • Smokeless tobacco: Never   Vaping Use   • Vaping Use: Never used   Substance Use Topics   • Alcohol use: No   • Drug use: No     I have reviewed and confirmed the accuracy of the patient's history: Chief complaint, HPI, ROS and Subjective as entered by the MA/LPN/RN. Denisha Gill, APRN 01/19/23     SUBJECTIVE:  Patient here for routine follow-up.  She is accompanied by her mother at the visit.  She reports that she has been compliant with her Gleevec and hydroxyurea.  She reports that she has been having worsening nausea and vomiting again.  She states that she has had 2 episodes of vomiting that have consisted of very dark bile.  She denies any constipation or diarrhea.  She denies any kirk blood.        REVIEW OF SYSTEMS:    Review of Systems   Constitutional: Negative for chills and fever.   HENT: Negative for ear pain, mouth sores, nosebleeds and sore throat.    Eyes: Negative for photophobia and visual disturbance.   Respiratory: Negative for wheezing and stridor.    Cardiovascular: Negative for chest pain and palpitations.   Gastrointestinal: Negative for abdominal pain,  "diarrhea, nausea and vomiting.   Endocrine: Negative for cold intolerance and heat intolerance.   Genitourinary: Negative for dysuria and hematuria.   Musculoskeletal: Negative for joint swelling and neck stiffness.   Skin: Negative for color change and rash.   Neurological: Negative for seizures and syncope.   Hematological: Negative for adenopathy.        No obvious bleeding   Psychiatric/Behavioral: Negative for agitation, confusion and hallucinations.       OBJECTIVE:    Vitals:    01/19/23 1429   BP: 122/78   Pulse: 114   Temp: 97.7 °F (36.5 °C)   TempSrc: Oral   SpO2: 95%   Weight: 64.3 kg (141 lb 12.8 oz)   Height: 162.6 cm (64\")   PainSc:   8   PainLoc: Leg  Comment: both legs and lower back     Body mass index is 24.34 kg/m².    ECOG  (1) Restricted in physically strenuous activity, ambulatory and able to do work of light nature    Physical Exam  Vitals and nursing note reviewed.   Constitutional:       General: She is not in acute distress.     Appearance: She is not diaphoretic.   HENT:      Head: Normocephalic and atraumatic.   Eyes:      General: No scleral icterus.        Right eye: No discharge.         Left eye: No discharge.      Conjunctiva/sclera: Conjunctivae normal.   Neck:      Thyroid: No thyromegaly.   Cardiovascular:      Rate and Rhythm: Normal rate and regular rhythm.      Heart sounds: Normal heart sounds. No friction rub. No gallop.    Pulmonary:      Effort: Pulmonary effort is normal. No respiratory distress.      Breath sounds: No stridor. No wheezing.   Abdominal:      General: Bowel sounds are normal.      Palpations: Abdomen is soft. There is no mass.      Tenderness: There is no abdominal tenderness. There is no guarding or rebound.   Musculoskeletal:         General: No tenderness. Normal range of motion.      Cervical back: Normal range of motion and neck supple.   Lymphadenopathy:      Cervical: No cervical adenopathy.   Skin:     General: Skin is warm.      Findings: No " erythema or rash.   Neurological:      Mental Status: She is alert and oriented to person, place, and time.      Motor: No abnormal muscle tone.   Psychiatric:         Behavior: Behavior normal.       I have reexamined the patient and the results are consistent with the previously documented exam. JP Varela     RECENT LABS    WBC   Date Value Ref Range Status   01/19/2023 10.67 3.40 - 10.80 10*3/mm3 Final     RBC   Date Value Ref Range Status   01/19/2023 3.74 (L) 3.77 - 5.28 10*6/mm3 Final     Hemoglobin   Date Value Ref Range Status   01/19/2023 10.0 (L) 12.0 - 15.9 g/dL Final     Hematocrit   Date Value Ref Range Status   01/19/2023 32.3 (L) 34.0 - 46.6 % Final     MCV   Date Value Ref Range Status   01/19/2023 86.4 79.0 - 97.0 fL Final     MCH   Date Value Ref Range Status   01/19/2023 26.7 26.6 - 33.0 pg Final     MCHC   Date Value Ref Range Status   01/19/2023 31.0 (L) 31.5 - 35.7 g/dL Final     RDW   Date Value Ref Range Status   01/19/2023 21.0 (H) 12.3 - 15.4 % Final     RDW-SD   Date Value Ref Range Status   01/19/2023 62.9 (H) 37.0 - 54.0 fl Final     MPV   Date Value Ref Range Status   01/19/2023 9.5 6.0 - 12.0 fL Final     Platelets   Date Value Ref Range Status   01/19/2023 370 140 - 450 10*3/mm3 Final     Neutrophil %   Date Value Ref Range Status   01/19/2023 70.2 42.7 - 76.0 % Final     Lymphocyte %   Date Value Ref Range Status   01/19/2023 16.0 (L) 19.6 - 45.3 % Final     Monocyte %   Date Value Ref Range Status   01/19/2023 8.2 5.0 - 12.0 % Final     Eosinophil %   Date Value Ref Range Status   01/19/2023 1.9 0.3 - 6.2 % Final     Basophil %   Date Value Ref Range Status   01/19/2023 3.7 (H) 0.0 - 1.5 % Final     External Neutrophils Abs   Date Value Ref Range Status   11/30/2022 7.6 (H) 1.7 - 6.0 x10(3)/ul Final     Neutrophils, Absolute   Date Value Ref Range Status   01/19/2023 7.49 (H) 1.70 - 7.00 10*3/mm3 Final     Lymphocytes, Absolute   Date Value Ref Range Status    01/19/2023 1.71 0.70 - 3.10 10*3/mm3 Final     Monocytes, Absolute   Date Value Ref Range Status   01/19/2023 0.87 0.10 - 0.90 10*3/mm3 Final     Eosinophils, Absolute   Date Value Ref Range Status   01/19/2023 0.20 0.00 - 0.40 10*3/mm3 Final     Basophils, Absolute   Date Value Ref Range Status   01/19/2023 0.40 (H) 0.00 - 0.20 10*3/mm3 Final     nRBC   Date Value Ref Range Status   11/11/2022 2.0 (H) 0.0 - 0.2 /100 WBC Final   10/16/2019 0.2 0.0 - 0.2 /100 WBC Final       Lab Results   Component Value Date    GLUCOSE 269 (H) 01/12/2023    BUN 19 01/12/2023    CREATININE 0.78 01/12/2023    EGFRIFNONA 133 02/02/2022    BCR 24.4 01/12/2023    K 4.7 01/12/2023    CO2 23.0 01/12/2023    CALCIUM 9.2 01/12/2023    ALBUMIN 4.3 01/12/2023    LABIL2 1.0 04/18/2019    AST 37 (H) 01/12/2023    ALT 40 (H) 01/12/2023           ASSESSMENT    · Accelerated phase CML, status post bone marrow biopsy on 11/3/2022.  Currently on Gleevec.  Would resume current dose of 600 mg daily  · She still has significant thrombocytosis up to 1 million, will add low-dose hydroxyurea 500 mg twice a day with close monitoring of her CBCs  · We will request for her additional records from [facility]  · Pain management, status post pain pump insertion.  She will follow-up with pain management at [facility] or [facility]  · Noncompliant with TKI's for her CML.  Reviewed  · Recent cardiomyopathy diagnosis with EF around 26 to 30% nonischemic.  Follows up with Dr. Ruiz with cardiology services  · CML in chronic phase with no significant hematologic or molecular or cytogenetic response on   Imatinib.  ABL kinase mutational analysis was negative.  We  have represcribed to Tasigna 300 mg twice a day.  Patient has been noncompliant with treatments and ended up in the hospital with hyperleukocytosis, peripheral blood blasts.  Bone marrow biopsy suggest that she remains in chronic phase.  Continue to Tasiigna at the current  doses.  Hydroxyurea has been discontinued.  · Uncontrolled diabetes type 1, followed at the [facility] by Dr. Calles  · Chronic anemia: Stable, multifactorial from CML, to Tasigna, hydroxyurea.  This has improved  · Insomnia  · Situational anxiety, not suicidal, on Celexa.  Will increase Xanax.  · Hyperleukocytosis secondary to CML  · History of medical noncompliance  · Pulmonary embolism: Xarelto restarted  · Recent COVID-19 pneumonia  · History of prolonged QTC        Plans    · Reviewed CBC with the patient  · Continue Gleevec 600 mg p.o. daily  · Discontinue Hydrea due to platelets normalizing on Gleevec  · Weekly labs CBC and CMP  · Request additional records from [facility]  · Discussed bone marrow results with Dr. Lerma and she would like BCR ABL1 kinase testing on the bone marrow added.  Per [facility] they are unable to do this.  · Informed patient that she will be seen on a weekly basis for now  · Weaned off Elavil since anxiety not improved  ·  Celexa 10 mg p.o. every morning prescribed by PCP  · Reviewed her EKG completed on 3/28/2022  · Monitor CBC closely  • Continue antiemetics  • GI consult for continued nausea and vomiting and reported dark emesis  • Continue promethazine and ondansetron and add PPI  • Last EKG showed normal QT  • Due to worsening anxiety increase Xanax to 0.5 mg p.o. nightly as needed number 30 pills-refill sent today.  • All questions answered  • Continue to follow-up with PCP per routine  • Follow-up with pain management per routine  • Follow-up with nurse practitioner in 1 week and Dr. Lerma in 3 weeks sooner if needed    I spent 30 total minutes, face-to-face, caring for Hope today.  90% of this time involved counseling and/or coordination of care as documented within this note.

## 2023-01-19 ENCOUNTER — OFFICE VISIT (OUTPATIENT)
Dept: ONCOLOGY | Facility: CLINIC | Age: 48
End: 2023-01-19
Payer: MEDICARE

## 2023-01-19 ENCOUNTER — LAB (OUTPATIENT)
Dept: LAB | Facility: HOSPITAL | Age: 48
End: 2023-01-19
Payer: MEDICARE

## 2023-01-19 VITALS
DIASTOLIC BLOOD PRESSURE: 78 MMHG | TEMPERATURE: 97.7 F | WEIGHT: 141.8 LBS | SYSTOLIC BLOOD PRESSURE: 122 MMHG | OXYGEN SATURATION: 95 % | BODY MASS INDEX: 24.21 KG/M2 | HEIGHT: 64 IN | HEART RATE: 114 BPM

## 2023-01-19 DIAGNOSIS — C92.10 BLAST CRISIS PHASE OF CHRONIC MYELOID LEUKEMIA: ICD-10-CM

## 2023-01-19 DIAGNOSIS — C95.90 LEUKEMIA NOT HAVING ACHIEVED REMISSION, UNSPECIFIED LEUKEMIA TYPE: ICD-10-CM

## 2023-01-19 DIAGNOSIS — C92.10 CML (CHRONIC MYELOCYTIC LEUKEMIA): Primary | ICD-10-CM

## 2023-01-19 DIAGNOSIS — R11.0 CHRONIC NAUSEA: ICD-10-CM

## 2023-01-19 DIAGNOSIS — C92.10 CML (CHRONIC MYELOCYTIC LEUKEMIA): ICD-10-CM

## 2023-01-19 LAB
ALBUMIN SERPL-MCNC: 4.3 G/DL (ref 3.5–5.2)
ALBUMIN/GLOB SERPL: 1.3 G/DL
ALP SERPL-CCNC: 693 U/L (ref 39–117)
ALT SERPL W P-5'-P-CCNC: 38 U/L (ref 1–33)
ANION GAP SERPL CALCULATED.3IONS-SCNC: 10 MMOL/L (ref 5–15)
AST SERPL-CCNC: 31 U/L (ref 1–32)
BASOPHILS # BLD AUTO: 0.4 10*3/MM3 (ref 0–0.2)
BASOPHILS NFR BLD AUTO: 3.7 % (ref 0–1.5)
BILIRUB SERPL-MCNC: 1.5 MG/DL (ref 0–1.2)
BUN SERPL-MCNC: 16 MG/DL (ref 6–20)
BUN/CREAT SERPL: 21.6 (ref 7–25)
CALCIUM SPEC-SCNC: 9.5 MG/DL (ref 8.6–10.5)
CHLORIDE SERPL-SCNC: 99 MMOL/L (ref 98–107)
CO2 SERPL-SCNC: 27 MMOL/L (ref 22–29)
CREAT SERPL-MCNC: 0.74 MG/DL (ref 0.57–1)
DEPRECATED RDW RBC AUTO: 62.9 FL (ref 37–54)
EGFRCR SERPLBLD CKD-EPI 2021: 100.6 ML/MIN/1.73
EOSINOPHIL # BLD AUTO: 0.2 10*3/MM3 (ref 0–0.4)
EOSINOPHIL NFR BLD AUTO: 1.9 % (ref 0.3–6.2)
ERYTHROCYTE [DISTWIDTH] IN BLOOD BY AUTOMATED COUNT: 21 % (ref 12.3–15.4)
GLOBULIN UR ELPH-MCNC: 3.2 GM/DL
GLUCOSE SERPL-MCNC: 341 MG/DL (ref 65–99)
HCT VFR BLD AUTO: 32.3 % (ref 34–46.6)
HGB BLD-MCNC: 10 G/DL (ref 12–15.9)
LYMPHOCYTES # BLD AUTO: 1.71 10*3/MM3 (ref 0.7–3.1)
LYMPHOCYTES NFR BLD AUTO: 16 % (ref 19.6–45.3)
MCH RBC QN AUTO: 26.7 PG (ref 26.6–33)
MCHC RBC AUTO-ENTMCNC: 31 G/DL (ref 31.5–35.7)
MCV RBC AUTO: 86.4 FL (ref 79–97)
MONOCYTES # BLD AUTO: 0.87 10*3/MM3 (ref 0.1–0.9)
MONOCYTES NFR BLD AUTO: 8.2 % (ref 5–12)
NEUTROPHILS NFR BLD AUTO: 7.49 10*3/MM3 (ref 1.7–7)
NEUTROPHILS NFR BLD AUTO: 70.2 % (ref 42.7–76)
PHOSPHATE SERPL-MCNC: 4.5 MG/DL (ref 2.5–4.5)
PLATELET # BLD AUTO: 370 10*3/MM3 (ref 140–450)
PMV BLD AUTO: 9.5 FL (ref 6–12)
POTASSIUM SERPL-SCNC: 4.8 MMOL/L (ref 3.5–5.2)
PROT SERPL-MCNC: 7.5 G/DL (ref 6–8.5)
RBC # BLD AUTO: 3.74 10*6/MM3 (ref 3.77–5.28)
SODIUM SERPL-SCNC: 136 MMOL/L (ref 136–145)
WBC NRBC COR # BLD: 10.67 10*3/MM3 (ref 3.4–10.8)

## 2023-01-19 PROCEDURE — 84100 ASSAY OF PHOSPHORUS: CPT

## 2023-01-19 PROCEDURE — 80053 COMPREHEN METABOLIC PANEL: CPT

## 2023-01-19 PROCEDURE — 36415 COLL VENOUS BLD VENIPUNCTURE: CPT

## 2023-01-19 PROCEDURE — 85025 COMPLETE CBC W/AUTO DIFF WBC: CPT

## 2023-01-19 PROCEDURE — 99214 OFFICE O/P EST MOD 30 MIN: CPT | Performed by: NURSE PRACTITIONER

## 2023-01-19 RX ORDER — OMEPRAZOLE 40 MG/1
40 CAPSULE, DELAYED RELEASE ORAL DAILY
Qty: 90 CAPSULE | Refills: 0 | Status: SHIPPED | OUTPATIENT
Start: 2023-01-19 | End: 2023-03-10 | Stop reason: ALTCHOICE

## 2023-01-20 ENCOUNTER — SPECIALTY PHARMACY (OUTPATIENT)
Dept: PHARMACY | Facility: HOSPITAL | Age: 48
End: 2023-01-20
Payer: MEDICARE

## 2023-01-20 NOTE — PROGRESS NOTES
Specialty Pharmacy Note: Gleevec          1/19/2023   WBC 3.40 - 10.80 10*3/mm3 10.67   Neutrophils Absolute 1.70 - 7.00 10*3/mm3 7.49 (A)   Hemoglobin 12.0 - 15.9 g/dL 10.0 (A)   Hematocrit 34.0 - 46.6 % 32.3 (A)   Platelets 140 - 450 10*3/mm3 370   Creatinine 0.57 - 1.00 mg/dL 0.74   BUN 6 - 20 mg/dL 16   Sodium 136 - 145 mmol/L 136   Potassium 3.5 - 5.2 mmol/L 4.8   Glucose 65 - 99 mg/dL 341 (A)   Calcium 8.6 - 10.5 mg/dL 9.5   Albumin 3.5 - 5.2 g/dL 4.3   Total Protein 6.0 - 8.5 g/dL 7.5   AST (SGOT) 1 - 32 U/L 31   ALT (SGPT) 1 - 33 U/L 38 (A)   Alkaline Phosphatase 39 - 117 U/L 693 (A)   Total Bilirubin 0.0 - 1.2 mg/dL 1.5 (A)     Pt current dose is 600 mg daily. LFTs and total bilirubin is stable and no dose adjustment needed at this time per PI: If elevations of bilirubin >3 times ULN or transaminases >5 times ULN occur, withhold treatment until bilirubin <1.5 times ULN and transaminases <2.5 times ULN. Resume treatment at a reduced dose.    Pt has f/u labs on 1/26. Pharmacy will continue to follow.      Thank you,  Amy Aldridge, Pharm.D.

## 2023-01-24 NOTE — PROGRESS NOTES
"PULMONARY CRITICAL CARE PROGRESS  NOTE      PATIENT IDENTIFICATION:  Name: Nieevs Mc  MRN: [id number]  :  1975     Age: 46 y.o.  Sex: female    DATE OF Note:  2022   Referring Physician: Shanelle Rodgers MD                  Subjective:   Patient visit CML with acute blast crisis was admitted to the hospital because of worsening shortness of breath coughing and found to have lung infiltrate, patient developed hyperkalemia required to be transferred to the ICU with significant anemia with significantly heavy.  Patient was on Xarelto for anticoagulation  Currently the patient is still having some shortness of breath coughing and she is complaining from pain in her both feet and more left leg she feels her breathing is better on the oxygen      Objective:  tMax 24 hrs: Temp (24hrs), Av.8 °F (36.6 °C), Min:97.6 °F (36.4 °C), Max:98 °F (36.7 °C)      Vitals Ranges:   Temp:  [97.6 °F (36.4 °C)-98 °F (36.7 °C)] 97.6 °F (36.4 °C)  Heart Rate:  [] 104  Resp:  [16-18] 18  BP: (92-99)/(48-58) 92/48    Intake and Output Last 3 Shifts:   I/O last 3 completed shifts:  In: 1200 [P.O.:1200]  Out: -     Exam:  BP 92/48 (BP Location: Right arm, Patient Position: Lying)   Pulse 104   Temp 97.6 °F (36.4 °C) (Oral)   Resp 18   Ht 162.6 cm (64\")   Wt 57.2 kg (126 lb)   LMP 2022 (Approximate)   SpO2 100%   BMI 21.63 kg/m²     General Appearance: Alert awake looks fatigue pale  HEENT:  Normocephalic, without obvious abnormality. Conjunctivae/corneas clear.  Normal external ear canals. Nares normal, no drainage     Neck:  Supple, symmetrical, trachea midline. No JVD.  Lungs /Chest wall:   Bilateral basal rhonchi, respirations unlabored, symmetrical wall movement.     Heart:  Regular rate and rhythm, systolic murmur PMI left sternal border  Abdomen: Soft, nontender, no masses, no organomegaly.    Extremities: Trace edema, no clubbing or cyanosis        Medications:    Current Facility-Administered " Medications:   •  acetaminophen (TYLENOL) tablet 650 mg, 650 mg, Oral, Q4H PRN, 650 mg at 06/30/22 0849 **OR** acetaminophen (TYLENOL) 160 MG/5ML solution 650 mg, 650 mg, Oral, Q4H PRN **OR** acetaminophen (TYLENOL) suppository 650 mg, 650 mg, Rectal, Q4H PRN, Mónica Tillman MD  •  ALPRAZolam (XANAX) tablet 0.25 mg, 0.25 mg, Oral, Nightly PRN, Mónica Tillman MD, 0.25 mg at 07/04/22 2114  •  aluminum-magnesium hydroxide-simethicone (MAALOX MAX) 400-400-40 MG/5ML suspension 15 mL, 15 mL, Oral, Q6H PRN, Mónica Tillman MD  •  citalopram (CeleXA) tablet 10 mg, 10 mg, Oral, Daily, Mónica Tillman MD, 10 mg at 07/05/22 0839  •  dextrose (D50W) (25 g/50 mL) IV injection 25 g, 25 g, Intravenous, Q15 Min PRN, Braden Huang MD  •  dextrose (GLUTOSE) oral gel 15 g, 15 g, Oral, Q15 Min PRN, Braden Huang MD  •  gabapentin (NEURONTIN) capsule 300 mg, 300 mg, Oral, TID, Mónica Tillman MD, 300 mg at 07/05/22 1610  •  glucagon (human recombinant) (GLUCAGEN DIAGNOSTIC) 1 mg in sterile water (preservative free) 1 mL injection, 1 mg, Subcutaneous, PRN, Mónica Tillman MD  •  glucagon (human recombinant) (GLUCAGEN DIAGNOSTIC) 1 mg in sterile water (preservative free) 1 mL injection, 1 mg, Intramuscular, Q15 Min PRN, Braden Huang MD  •  guaiFENesin (MUCINEX) 12 hr tablet 600 mg, 600 mg, Oral, Q12H, Braden Huang MD, 600 mg at 07/05/22 0839  •  guaiFENesin-codeine (GUAIFENESIN AC) 100-10 MG/5ML liquid 5 mL, 5 mL, Oral, Q4H PRN, Braden Huang MD  •  insulin glargine (LANTUS, SEMGLEE) injection 18 Units, 18 Units, Subcutaneous, Daily, Dalton Garcia MD, 18 Units at 07/05/22 0830  •  melatonin tablet 5 mg, 5 mg, Oral, Nightly PRN, Mónica Tillman MD  •  metoprolol succinate XL (TOPROL-XL) 24 hr tablet 25 mg, 25 mg, Oral, Daily, Mónica Tillman MD, 25 mg at 07/04/22 0950  •  ondansetron (ZOFRAN) tablet 4 mg, 4 mg, Oral, Q6H PRN, 4 mg at 07/05/22 0940 **OR** ondansetron (ZOFRAN) injection 4 mg, 4 mg,  Intravenous, Q6H PRN, Mónica Tillman MD  •  oxyCODONE (ROXICODONE) immediate release tablet 10 mg, 10 mg, Oral, Q4H PRN, Braden Huang MD, 10 mg at 07/05/22 1618  •  sodium chloride 0.45 % 1,000 mL with sodium bicarbonate 8.4 % 75 mEq infusion, , Intravenous, Continuous, Giselle Andre MD, Last Rate: 150 mL/hr at 07/05/22 1611, New Bag at 07/05/22 1611  •  sodium chloride 0.9 % flush 10 mL, 10 mL, Intravenous, PRN, HottmanRandal DO  •  [COMPLETED] Insert peripheral IV, , , Once **AND** sodium chloride 0.9 % flush 10 mL, 10 mL, Intravenous, PRN, Hottman, Randal Shukla, DO  •  sodium chloride 0.9 % flush 10 mL, 10 mL, Intravenous, Q12H, Mónica Tillman MD, 10 mL at 07/05/22 0841  •  sodium chloride 0.9 % flush 10 mL, 10 mL, Intravenous, PRN, Mónica Tillman MD  •  sodium zirconium cyclosilicate (LOKELMA) pack 10 g, 10 g, Oral, TID, Giselle Andre MD, 10 g at 07/05/22 1348  •  traZODone (DESYREL) tablet 50 mg, 50 mg, Oral, Nightly, Braden Huang MD, 50 mg at 07/04/22 1956    Data Review:  All labs (24hrs):   Recent Results (from the past 24 hour(s))   POC Glucose Once    Collection Time: 07/04/22  9:15 PM    Specimen: Blood   Result Value Ref Range    Glucose 277 (H) 70 - 105 mg/dL   POC Glucose Once    Collection Time: 07/05/22  7:28 AM    Specimen: Blood   Result Value Ref Range    Glucose 211 (H) 70 - 105 mg/dL   Phosphorus    Collection Time: 07/05/22  9:08 AM    Specimen: Blood   Result Value Ref Range    Phosphorus 7.1 (H) 2.5 - 4.5 mg/dL   BNP    Collection Time: 07/05/22  9:08 AM    Specimen: Blood   Result Value Ref Range    proBNP 858.8 (H) 0.0 - 450.0 pg/mL   Comprehensive Metabolic Panel    Collection Time: 07/05/22  9:08 AM    Specimen: Blood   Result Value Ref Range    Glucose 193 (H) 65 - 99 mg/dL    BUN 59 (H) 6 - 20 mg/dL    Creatinine 0.99 0.57 - 1.00 mg/dL    Sodium 132 (L) 136 - 145 mmol/L    Potassium 5.3 (H) 3.5 - 5.2 mmol/L    Chloride 96 (L) 98 - 107 mmol/L    CO2 20.0 (L) 22.0 -  29.0 mmol/L    Calcium 8.9 8.6 - 10.5 mg/dL    Total Protein 7.9 6.0 - 8.5 g/dL    Albumin 3.90 3.50 - 5.20 g/dL    ALT (SGPT) 9 1 - 33 U/L    AST (SGOT) 14 1 - 32 U/L    Alkaline Phosphatase 720 (H) 39 - 117 U/L    Total Bilirubin 0.4 0.0 - 1.2 mg/dL    Globulin 4.0 gm/dL    A/G Ratio 1.0 g/dL    BUN/Creatinine Ratio 59.6 (H) 7.0 - 25.0    Anion Gap 16.0 (H) 5.0 - 15.0 mmol/L    eGFR 71.4 >60.0 mL/min/1.73   POC Glucose Once    Collection Time: 07/05/22  9:11 AM    Specimen: Blood   Result Value Ref Range    Glucose 188 (H) 70 - 105 mg/dL   Comprehensive Metabolic Panel    Collection Time: 07/05/22 11:05 AM    Specimen: Blood   Result Value Ref Range    Glucose 184 (H) 65 - 99 mg/dL    BUN 61 (H) 6 - 20 mg/dL    Creatinine 0.94 0.57 - 1.00 mg/dL    Sodium 130 (L) 136 - 145 mmol/L    Potassium 7.1 (C) 3.5 - 5.2 mmol/L    Chloride 95 (L) 98 - 107 mmol/L    CO2 23.0 22.0 - 29.0 mmol/L    Calcium 8.6 8.6 - 10.5 mg/dL    Total Protein 7.1 6.0 - 8.5 g/dL    Albumin 3.60 3.50 - 5.20 g/dL    ALT (SGPT) 9 1 - 33 U/L    AST (SGOT) 11 1 - 32 U/L    Alkaline Phosphatase 672 (H) 39 - 117 U/L    Total Bilirubin 0.3 0.0 - 1.2 mg/dL    Globulin 3.5 gm/dL    A/G Ratio 1.0 g/dL    BUN/Creatinine Ratio 64.9 (H) 7.0 - 25.0    Anion Gap 12.0 5.0 - 15.0 mmol/L    eGFR 75.9 >60.0 mL/min/1.73   CBC Auto Differential    Collection Time: 07/05/22 11:05 AM    Specimen: Blood   Result Value Ref Range    WBC 70.90 (C) 3.40 - 10.80 10*3/mm3    RBC 2.72 (L) 3.77 - 5.28 10*6/mm3    Hemoglobin 6.7 (C) 12.0 - 15.9 g/dL    Hematocrit 22.3 (L) 34.0 - 46.6 %    MCV 81.8 79.0 - 97.0 fL    MCH 24.8 (L) 26.6 - 33.0 pg    MCHC 30.3 (L) 31.5 - 35.7 g/dL    RDW 22.2 (H) 12.3 - 15.4 %    RDW-SD 62.1 (H) 37.0 - 54.0 fl    MPV 7.7 6.0 - 12.0 fL    Platelets 169 140 - 450 10*3/mm3   Scan Slide    Collection Time: 07/05/22 11:05 AM    Specimen: Blood   Result Value Ref Range    Scan Slide     Uric Acid    Collection Time: 07/05/22 11:05 AM    Specimen: Blood    Result Value Ref Range    Uric Acid 13.0 (H) 2.4 - 5.7 mg/dL   Manual Differential    Collection Time: 07/05/22 11:05 AM    Specimen: Blood   Result Value Ref Range    Neutrophil % 50.0 42.7 - 76.0 %    Lymphocyte % 9.0 (L) 19.6 - 45.3 %    Monocyte % 3.0 (L) 5.0 - 12.0 %    Eosinophil % 4.0 0.3 - 6.2 %    Basophil % 16.0 (H) 0.0 - 1.5 %    Bands %  6.0 (H) 0.0 - 5.0 %    Metamyelocyte % 2.0 (H) 0.0 - 0.0 %    Myelocyte % 1.0 (H) 0.0 - 0.0 %    Promyelocyte % 3.0 (H) 0.0 - 0.0 %    Blasts % 6.0 (H) 0.0 - 0.0 %    Neutrophils Absolute 39.70 (H) 1.70 - 7.00 10*3/mm3    Lymphocytes Absolute 6.38 (H) 0.70 - 3.10 10*3/mm3    Monocytes Absolute 2.13 (H) 0.10 - 0.90 10*3/mm3    Eosinophils Absolute 2.84 (H) 0.00 - 0.40 10*3/mm3    Basophils Absolute 11.34 (H) 0.00 - 0.20 10*3/mm3    Anisocytosis Slight/1+ None Seen    WBC Morphology Normal Normal    Platelet Morphology Normal Normal   Path Consult Reflex    Collection Time: 07/05/22 11:05 AM    Specimen: Blood   Result Value Ref Range    Pathology Review Yes    POC Glucose Once    Collection Time: 07/05/22 12:08 PM    Specimen: Blood   Result Value Ref Range    Glucose 181 (H) 70 - 105 mg/dL   Type & Screen    Collection Time: 07/05/22  1:07 PM    Specimen: Arm, Right; Blood   Result Value Ref Range    ABO Type A     RH type Positive     Antibody Screen Positive     T&S Expiration Date 7/8/2022 11:59:59 PM    Sodium, Urine, Random - Urine, Clean Catch    Collection Time: 07/05/22  4:20 PM    Specimen: Urine, Clean Catch   Result Value Ref Range    Sodium, Urine 86 mmol/L   Urinalysis With Microscopic If Indicated (No Culture) - Urine, Clean Catch    Collection Time: 07/05/22  4:20 PM    Specimen: Urine, Clean Catch   Result Value Ref Range    Color, UA Yellow Yellow, Straw    Appearance, UA Clear Clear    pH, UA <=5.0 5.0 - 8.0    Specific Gravity, UA 1.009 1.005 - 1.030    Glucose, UA Negative Negative    Ketones, UA Negative Negative    Bilirubin, UA Negative Negative     Blood, UA Large (3+) (A) Negative    Protein, UA Negative Negative    Leuk Esterase, UA Trace (A) Negative    Nitrite, UA Negative Negative    Urobilinogen, UA 0.2 E.U./dL 0.2 - 1.0 E.U./dL   Urinalysis, Microscopic Only - Urine, Clean Catch    Collection Time: 07/05/22  4:20 PM    Specimen: Urine, Clean Catch   Result Value Ref Range    RBC, UA Too Numerous to Count (A) None Seen /HPF    WBC, UA 0-2 (A) None Seen /HPF    Bacteria, UA None Seen None Seen /HPF    Squamous Epithelial Cells, UA 0-2 None Seen, 0-2 /HPF    Hyaline Casts, UA 0-2 None Seen /LPF    Methodology Automated Microscopy    POC Glucose Once    Collection Time: 07/05/22  4:30 PM    Specimen: Blood   Result Value Ref Range    Glucose 186 (H) 70 - 105 mg/dL   Basic Metabolic Panel    Collection Time: 07/05/22  4:46 PM    Specimen: Blood   Result Value Ref Range    Glucose 205 (H) 65 - 99 mg/dL    BUN 60 (H) 6 - 20 mg/dL    Creatinine 0.98 0.57 - 1.00 mg/dL    Sodium 131 (L) 136 - 145 mmol/L    Potassium 6.4 (C) 3.5 - 5.2 mmol/L    Chloride 96 (L) 98 - 107 mmol/L    CO2 25.0 22.0 - 29.0 mmol/L    Calcium 8.9 8.6 - 10.5 mg/dL    BUN/Creatinine Ratio 61.2 (H) 7.0 - 25.0    Anion Gap 10.0 5.0 - 15.0 mmol/L    eGFR 72.2 >60.0 mL/min/1.73   ECG 12 Lead    Collection Time: 07/05/22  4:54 PM   Result Value Ref Range    QT Interval 348 ms        Imaging:  US Pelvis Complete, US Non-ob Transvaginal  Narrative: DATE OF EXAM:  7/4/2022 3:51 PM     PROCEDURE:  US NON-OB TRANSVAGINAL-, US PELVIS COMPLETE-     INDICATIONS:  menorrhagia; C92.10-Chronic myeloid leukemia, BCR/ABL-positive, not  having achieved remission; J18.9-Pneumonia, unspecified organism;  I50.9-Heart failure, unspecified     COMPARISON:  CT abdomen pelvis from 06/01/2022     TECHNIQUE:   Transvaginal pelvic ultrasound was performed.  Grayscale and color  Doppler imaging was utilized to evaluate the pelvic area with image  documentation per protocol.     FINDINGS:  The uterus measures 8.6 x 3.3  x 5.4 cm and contains a couple nonspecific  dystrophic calcifications, but is otherwise unremarkable. The  endometrial stripe thickness measures 7.6 mm. The right ovary measures  3.1 x 2.7 x 3.5 cm and contains a 2.3 cm cyst. The left ovary measures  2.3 x 2.6 x 2.0 cm and appears normal. Normal flow is seen within both  ovaries.     Impression: 1.Uterus and left adnexa are unremarkable.  2.2.3 cm right adnexal cyst, likely physiologic.     Electronically Signed By-Amador Larry MD On:7/4/2022 4:43 PM  This report was finalized on [id number] by  Amador Larry MD.       ASSESSMENT:  Acute respiratory distress  Lung infiltrate could be due to hematology malignancy, pneumonitis  Hyperkalemia  Blast crisis  CML (chronic myelocytic leukemia) (HCC)    Depression with anxiety    History of pulmonary embolism    Type 2 diabetes mellitus with diabetic polyneuropathy, with long-term current use of insulin (HCC)    Moderate malnutrition (HCC)    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)    Acute systolic CHF (congestive heart failure) (HCC)    Menorrhagia       PLAN:  Correct potassium  Blood transfusion  Hold Xarelto  Regard the lung infiltrate we will going to monitor for now as long as her oxygenation improving  We will follow-up with chest x-ray  Bronchodilator  Inhaled corticosteroids  Electrolytes/ glycemic control  DVT and GI prophylaxis.    Total Critical care time in direct medical management ( 36  ) minutes. This time specifically excludes time spent performing procedures.  Shanelle Rodgers MD. D, ABSM.     7/5/2022  17:32 EDT    87

## 2023-01-25 NOTE — PROGRESS NOTES
Hematology/Oncology Outpatient Follow Up    PATIENT NAME:Nieves Mc  :1975  MRN: [id number]  PRIMARY CARE PHYSICIAN: Alida Latham APRN  REFERRING PHYSICIAN: Alida Latham, *    Chief Complaint   Patient presents with   • Follow-up     CML (chronic myelocytic leukemia) (HCC)        HISTORY OF PRESENT ILLNESS:      Patient is a 47 y.o. female with PMH significant for CML on treatment with Imatinib.  Patient stated that she typically feels poorly with Imatinib with frequent nausea and vomiting.  Also complained of weakness and fatigue.  Patient presented to ED on 10/22/18 with complaints of an elevated blood sugar around 400.  Stated she felt poorly and has had a productive cough with yellow sputum.  Complained of nausea and stated she was unable to keep any food down anytime she ate.  The patient reported subjective fever without chills.  She stated she had been coughing so hard that she was vomiting.  She denied hematemesis.  Denied diarrhea, constipation or blood in her stool.       Patient’s chest x-ray showed no evidence of acute pulmonary embolus.  The patient has ground-glass opacities throughout the lungs.  There is some air trapping noted which would appear to be   infectious.  Patient was started on antibiotics.      Hem/Onc was consulted on 10/23/18 for co-management of patient with CML.  Patient was seen by Dr. Lerma on 10/12 on previous admission for patient reported CML on Imatinib (Gleevec).  Patient reports she is under the care of Dr. Roach at [facility] in [place].  Patient was seen by Dr. Lerma in May 2018 when patient presented with hematuria, which was attributed to her Imatinib.  Dr. Lerma followed during hospitalization but then patient returned to Dr. Roach for her usual follow up.  She was again seen on 10/12.  Patient is stating she will switch to Dr. Lerma.    · Review of Dr. Roach’s note:  On 18 patient had BCR-abl which was 61.326.   Patient had been on Imatinib 400 mg daily.  She had presented in March 2018 with WBC of 24, hemoglobin 13.1, platelet count of 1,067,000.  Patient apparently had follow up BCR-abl in August 2018, results are not available for review.  Her bone marrow aspiration and biopsy was also performed at that time is currently not available for review.    · 11/6/18 - .  Uric acid 6.5.  BCR-abl by RT-PCR was measured at 3.36%.    · Due to thrombocytosis, patient was placed on Hydroxyurea 500 mg twice a day on 12/11/18.  Platelet count juana to 900,000.  Patient was noncompliant with CBCs that were recommended.    Patient was asked to return to the office after not being able to reach her.   · 12/27/18 - Bone marrow aspiration and biopsy was consistent with chronic myeloid leukemia.  BCR-abl positive, chronic phase.  ABL kinase mutational analysis is currently pending on the bone marrow.    · 1/3/19 - Repeat CBC, WBC 17, hemoglobin 11.9, platelet count 1,072,000.  Hydroxyurea was increased to 1 gm twice a day with follow up CBC.    · 1/6/19 - Follow up CBC showed progressive increase in platelet to 1.1 million.  Patient was offered admission to the hospital, which she declined.  Hydroxyurea was increased to 1 gm three   times a day as of 1/6/19.   · 1/7/19 - EKG shows sinus tachycardia.   milliseconds.   ·  ABL kinase on peripheral blood was ordered on 1/11/19.  Based on sequence analysis, there was no mutation detected.    · 2/12/19 - Patient was initiated on Tasigna 300 mg twice a day.  · 2/13/19 - Urinalysis was negative for hematuria.   · 2/22/19 -  milliseconds.  · 3/13/19 - EKG with QTC is 413 milliseconds.  Nonspecific changes noted.    · 4/18/19 - EKG showed QTC prolonged to 453 milliseconds.  This is changed from prior.  · 4/18/19 - Free T4 normal at 0.91.  Magnesium was 1.7.  BUN is 6.  Creatinine 0.7.  Bilirubin 2.2.  Phosphorous normal 3.7.  TSH 0.43, normal.  Free T3 was normal at 3.47.  Ionized  calcium   normal at 1.23.  BCR-abl was 0.522%.    · 5/7/19 - EKG showed QTC of 441 milliseconds.  QT was 366.     · Patient has been lost to follow-up since 2019 for CML.  Patient states that she lost her insurance.  She also does not have any primary care physician at this time.  She is diabetic on insulin.  · Patient was recently admitted to the hospital for pneumonia due to COVID-19 infection.  At that time patient made a decision to become compliant with treatment.  She is currently on to [employer] 300 mg p.o. twice a day.  She is also on hydroxyurea 1 g twice a day.  Overall she feels better, she has more energy.  · 3/1/2022 white count is 53.92, hemoglobin 11.7 and platelets are 472    · 6/1/2022: Patient has not been seen since March 2022.  Also verified that she has not been taking to Tasigna since February 2022.  She comes in today with cough for 1 and half months, shortness of breath, denies fevers.  · 6/30/2022 patient had 2D echocardiogram which showed an EF of 41 to 45%.  Left ventricular cavity is mildly dilated.  She was diagnosed with nonischemic cardiomyopathy    · 11/18/2022: Patient was transferred from [facility] to [facility] due to cardiac failure.  She was then seen by NP [facility] hematology department.  Patient underwent tumor aspiration and biopsy at that time did not show chronic myeloid leukemia in accelerated phase markedly hypercellular marrow with megakaryocytic and myeloid hyperplasia with atypia and increased basophils blasts are not increased less than 1% moderate reticulin fibrosis.  According to the patient hydroxyurea was discontinued she was prescribed Gleevec 600 mg daily but she has only been taking 400 mg as she cannot find other milligram tablets.  She is already been pump inserted into her pelvic area.  She continues to experience nausea which resulted in her not taking the baclofen she should.  · 1/12/2023: WBC 17.64, hemoglobin 10.2, MCV 88.8,  platelets 458,000.  On Gleevec 600 mg daily and hydroxyurea 500 mg twice daily.  · 2023: WBC 10.67, hemoglobin 10.0, MCV 86.4, platelets 370,000.  Patient on Gleevec 600 mg daily and hydroxyurea 500 mg once a day.  Patient instructed at the visit to discontinue her hydroxyurea.  · 2023: WBC 17.75, hemoglobin 10.4, MCV 87.2, platelets 425,000.  On Gleevec 600 mg daily.    Past Medical History:   Diagnosis Date   • Bone pain    • COVID-19 virus infection 2022   • Diabetes mellitus (HCC)    • Extremity pain     Carlin. legs pain   • Leg pain     left leg greater   • Migraine    • Pulmonary embolism (HCC)    • Vision loss     doing surgery       Past Surgical History:   Procedure Laterality Date   • BONE MARROW BIOPSY     • BREAST SURGERY     • BRONCHOSCOPY N/A 2022    Procedure: BRONCHOSCOPY bilateral lung washing;  Surgeon: Charlene Camara MD;  Location: [facility];  Service: Pulmonary;  Laterality: N/A;  post: rule out infection vs transfusion lung injury   •  SECTION     • CHOLECYSTECTOMY     • EYE SURGERY      laser surgery due  to hemmorage--- 2021-- another surgery  lt eye11/15/21   • RETINAL DETACHMENT SURGERY     • SPINE SURGERY      Lombardi spinal block   • TUBAL ABDOMINAL LIGATION           Current Outpatient Medications:   •  acetaminophen (TYLENOL) 325 MG tablet, 650 mg., Disp: , Rfl:   •  allopurinol (ZYLOPRIM) 100 MG tablet, Take 1 tablet by mouth 2 (Two) Times a Day., Disp: 30 tablet, Rfl: 0  •  allopurinol (ZYLOPRIM) 300 MG tablet, 300 mg., Disp: , Rfl:   •  ALPRAZolam (Xanax) 0.5 MG tablet, Take 1 tablet by mouth At Night As Needed for Anxiety., Disp: 30 tablet, Rfl: 0  •  budesonide-formoterol (SYMBICORT) 160-4.5 MCG/ACT inhaler, Inhale 2 puffs 2 (Two) Times a Day., Disp: 10.2 g, Rfl: 1  •  citalopram (CeleXA) 10 MG tablet, Take 1 tablet by mouth Daily. To begin 22, Disp: 30 tablet, Rfl: 2  •  dapagliflozin Propanediol (Farxiga) 10 MG tablet, 10 mg., Disp: , Rfl:    •  folic acid (FOLVITE) 1 MG tablet, Take 1 tablet by mouth Daily., Disp: 30 tablet, Rfl: 0  •  furosemide (LASIX) 20 MG tablet, Take 20 mg by mouth Daily., Disp: , Rfl:   •  gabapentin (NEURONTIN) 300 MG capsule, 600 mg., Disp: , Rfl:   •  hydroxyurea (HYDREA) 500 MG capsule, Take 500 mg by mouth 2 (Two) Times a Day., Disp: 60 capsule, Rfl: 3  •  hydroxyurea (HYDREA) 500 MG capsule, Take 500 mg by mouth 2 (Two) Times a Day. Starting 1/5, Disp: , Rfl:   •  hydrOXYzine (ATARAX) 25 MG tablet, Take 25 mg by mouth Every 8 (Eight) Hours As Needed., Disp: , Rfl:   •  imatinib (GLEEVEC) 100 MG chemo tablet, Take 2 tablets by mouth with 1 other imatinib prescription for 600 mg total Daily. Take with food and large glass of water; Do not take with Grapefruit juice., Disp: 60 tablet, Rfl: 2  •  imatinib (GLEEVEC) 400 MG chemo tablet, Take 1 tablet by mouth with 1 other imatinib prescription for 600 mg total Daily. Take with food and large glass of water; Do not take with Grapefruit juice., Disp: 30 tablet, Rfl: 2  •  Insulin Glargine (BASAGLAR KWIKPEN) 100 UNIT/ML injection pen, Inject 20 Units under the skin into the appropriate area as directed Daily., Disp: 10 mL, Rfl: 3  •  Insulin Glargine, 1 Unit Dial, (TOUJEO) 300 UNIT/ML solution pen-injector injection,  20 Units 0.2 mL, SubCutaneous, Inj, Daily, 0 Refill(s), Disp: , Rfl:   •  Insulin Lispro (ADMELOG SOLOSTAR) 100 UNIT/ML injection pen, Inject 6 Units under the skin into the appropriate area as directed 3 (Three) Times a Day., Disp: 3 mL, Rfl: 3  •  losartan (COZAAR) 25 MG tablet, Take 1 tablet by mouth Daily., Disp: 30 tablet, Rfl: 0  •  metoprolol succinate XL (TOPROL-XL) 25 MG 24 hr tablet, 25 mg., Disp: , Rfl:   •  omeprazole (priLOSEC) 40 MG capsule, Take 1 capsule by mouth Daily., Disp: 90 capsule, Rfl: 0  •  ondansetron ODT (ZOFRAN-ODT) 8 MG disintegrating tablet, Place 1 tablet on the tongue Every 8 (Eight) Hours As Needed for Nausea or Vomiting., Disp: 20  tablet, Rfl: 2  •  oxymetazoline (AFRIN) 0.05 % nasal spray,  2 Spray, Nostrils Both, Spray, BID, 0 Refill(s), Disp: , Rfl:   •  promethazine (PHENERGAN) 12.5 MG tablet, TAKE 1 TABLET BY MOUTH EVERY 4 HOURS as needed FOR nausea and/or vomiting, Disp: , Rfl:   •  sacubitril-valsartan (Entresto) 24-26 MG tablet,  1 Tab, Oral, Tab, BID, # 60 Tab, 0 Refill(s), Disp: , Rfl:   •  carvedilol (COREG) 6.25 MG tablet, Take 1 tablet by mouth 2 (Two) Times a Day With Meals for 30 days., Disp: 60 tablet, Rfl: 0    Allergies   Allergen Reactions   • Hydromorphone GI Intolerance     dilaudid   • Morphine Nausea And Vomiting   • Propofol Hives     Previously tolerated being premedicated with diphenhydramine and famotadine       Family History   Problem Relation Age of Onset   • Diabetes Mother    • Diabetes Maternal Grandmother    • Heart attack Maternal Grandmother    • Stroke Maternal Grandmother        Cancer-related family history is not on file.    Social History     Tobacco Use   • Smoking status: Never   • Smokeless tobacco: Never   Vaping Use   • Vaping Use: Never used   Substance Use Topics   • Alcohol use: No   • Drug use: No     I have reviewed and confirmed the accuracy of the patient's history: Chief complaint, HPI, ROS and Subjective as entered by the MA/LPN/RN. Denisha Gill, APRN 01/26/23     SUBJECTIVE:  Patient here for routine follow-up.  She is accompanied by her mother at the visit.  She reports that she has been compliant with her Gleevec and hydroxyurea.  She has no new complaints at the visit.  She has been watching her foods to try to correlate what may be triggering her nausea and vomiting.  She has not had no further vomiting of dark emesis.  She has not yet heard from the gastroenterologist for her referral appointment.  She has followed up with both her PCP and pain management since her last appointment.        REVIEW OF SYSTEMS:    Review of Systems   Constitutional: Negative for chills and  "fever.   HENT: Negative for ear pain, mouth sores, nosebleeds and sore throat.    Eyes: Negative for photophobia and visual disturbance.   Respiratory: Negative for wheezing and stridor.    Cardiovascular: Negative for chest pain and palpitations.   Gastrointestinal: Negative for abdominal pain, diarrhea, nausea and vomiting.   Endocrine: Negative for cold intolerance and heat intolerance.   Genitourinary: Negative for dysuria and hematuria.   Musculoskeletal: Negative for joint swelling and neck stiffness.   Skin: Negative for color change and rash.   Neurological: Negative for seizures and syncope.   Hematological: Negative for adenopathy.        No obvious bleeding   Psychiatric/Behavioral: Negative for agitation, confusion and hallucinations.       OBJECTIVE:    Vitals:    01/26/23 1425   BP: 137/85   Pulse: 105   Temp: 98.2 °F (36.8 °C)   TempSrc: Oral   SpO2: 96%   Weight: 65 kg (143 lb 6.4 oz)   Height: 162.6 cm (64\")   PainSc: 10-Worst pain ever   PainLoc: Leg  Comment: lower back and both legs     Body mass index is 24.61 kg/m².    ECOG  (1) Restricted in physically strenuous activity, ambulatory and able to do work of light nature    Physical Exam  Vitals and nursing note reviewed.   Constitutional:       General: She is not in acute distress.     Appearance: She is not diaphoretic.   HENT:      Head: Normocephalic and atraumatic.   Eyes:      General: No scleral icterus.        Right eye: No discharge.         Left eye: No discharge.      Conjunctiva/sclera: Conjunctivae normal.   Neck:      Thyroid: No thyromegaly.   Cardiovascular:      Rate and Rhythm: Normal rate and regular rhythm.      Heart sounds: Normal heart sounds. No friction rub. No gallop.    Pulmonary:      Effort: Pulmonary effort is normal. No respiratory distress.      Breath sounds: No stridor. No wheezing.   Abdominal:      General: Bowel sounds are normal.      Palpations: Abdomen is soft. There is no mass.      Tenderness: There is " no abdominal tenderness. There is no guarding or rebound.   Musculoskeletal:         General: No tenderness. Normal range of motion.      Cervical back: Normal range of motion and neck supple.   Lymphadenopathy:      Cervical: No cervical adenopathy.   Skin:     General: Skin is warm.      Findings: No erythema or rash.   Neurological:      Mental Status: She is alert and oriented to person, place, and time.      Motor: No abnormal muscle tone.   Psychiatric:         Behavior: Behavior normal.       I have reexamined the patient and the results are consistent with the previously documented exam. JP Varela     RECENT LABS    WBC   Date Value Ref Range Status   01/26/2023 17.75 (H) 3.40 - 10.80 10*3/mm3 Final     RBC   Date Value Ref Range Status   01/26/2023 3.91 3.77 - 5.28 10*6/mm3 Final     Hemoglobin   Date Value Ref Range Status   01/26/2023 10.4 (L) 12.0 - 15.9 g/dL Final     Hematocrit   Date Value Ref Range Status   01/26/2023 34.1 34.0 - 46.6 % Final     MCV   Date Value Ref Range Status   01/26/2023 87.2 79.0 - 97.0 fL Final     MCH   Date Value Ref Range Status   01/26/2023 26.6 26.6 - 33.0 pg Final     MCHC   Date Value Ref Range Status   01/26/2023 30.5 (L) 31.5 - 35.7 g/dL Final     RDW   Date Value Ref Range Status   01/26/2023 21.8 (H) 12.3 - 15.4 % Final     RDW-SD   Date Value Ref Range Status   01/26/2023 66.1 (H) 37.0 - 54.0 fl Final     MPV   Date Value Ref Range Status   01/26/2023 9.9 6.0 - 12.0 fL Final     Platelets   Date Value Ref Range Status   01/26/2023 425 140 - 450 10*3/mm3 Final     Neutrophil %   Date Value Ref Range Status   01/19/2023 70.2 42.7 - 76.0 % Final     Lymphocyte %   Date Value Ref Range Status   01/26/2023 12.9 (L) 19.6 - 45.3 % Final     Monocyte %   Date Value Ref Range Status   01/26/2023 6.9 5.0 - 12.0 % Final     Eosinophil %   Date Value Ref Range Status   01/26/2023 1.7 0.3 - 6.2 % Final     Basophil %   Date Value Ref Range Status   01/19/2023  3.7 (H) 0.0 - 1.5 % Final     External Neutrophils Abs   Date Value Ref Range Status   11/30/2022 7.6 (H) 1.7 - 6.0 x10(3)/ul Final     Neutrophils, Absolute   Date Value Ref Range Status   01/19/2023 7.49 (H) 1.70 - 7.00 10*3/mm3 Final     Lymphocytes, Absolute   Date Value Ref Range Status   01/26/2023 2.29 0.70 - 3.10 10*3/mm3 Final     Monocytes, Absolute   Date Value Ref Range Status   01/26/2023 1.23 (H) 0.10 - 0.90 10*3/mm3 Final     Eosinophils, Absolute   Date Value Ref Range Status   01/26/2023 0.31 0.00 - 0.40 10*3/mm3 Final     Basophils, Absolute   Date Value Ref Range Status   01/19/2023 0.40 (H) 0.00 - 0.20 10*3/mm3 Final     nRBC   Date Value Ref Range Status   11/11/2022 2.0 (H) 0.0 - 0.2 /100 WBC Final   10/16/2019 0.2 0.0 - 0.2 /100 WBC Final       Lab Results   Component Value Date    GLUCOSE 341 (H) 01/19/2023    BUN 16 01/19/2023    CREATININE 0.74 01/19/2023    EGFRIFNONA 133 02/02/2022    BCR 21.6 01/19/2023    K 4.8 01/19/2023    CO2 27.0 01/19/2023    CALCIUM 9.5 01/19/2023    ALBUMIN 4.3 01/19/2023    LABIL2 1.0 04/18/2019    AST 31 01/19/2023    ALT 38 (H) 01/19/2023           ASSESSMENT    · Accelerated phase CML, status post bone marrow biopsy on 11/3/2022.  Currently on Gleevec.  Would resume current dose of 600 mg daily  · She still has significant thrombocytosis up to 1 million, will add low-dose hydroxyurea 500 mg twice a day with close monitoring of her CBCs  · We will request for her additional records from [facility]  · Pain management, status post pain pump insertion.  She will follow-up with pain management at [place] or [facility]  · Noncompliant with TKI's for her CML.  Reviewed  · Recent cardiomyopathy diagnosis with EF around 26 to 30% nonischemic.  Follows up with Dr. Ruiz with cardiology services  · CML in chronic phase with no significant hematologic or molecular or cytogenetic response on   Imatinib.  ABL kinase mutational analysis was  negative.  We  have represcribed to Tasigna 300 mg twice a day.  Patient has been noncompliant with treatments and ended up in the hospital with hyperleukocytosis, peripheral blood blasts.  Bone marrow biopsy suggest that she remains in chronic phase.  Continue to Tasiigna at the current doses.  Hydroxyurea has been discontinued.  · Uncontrolled diabetes type 1, followed at [facility] by Dr. Calles  · Chronic anemia: Stable, multifactorial from CML, to Tasigna, hydroxyurea.  This has improved  · Insomnia  · Situational anxiety, not suicidal, on Celexa.  Will increase Xanax.  · Hyperleukocytosis secondary to CML  · History of medical noncompliance  · Pulmonary embolism: Xarelto restarted  · Recent COVID-19 pneumonia  · History of prolonged QTC        Plans    · Reviewed CBC with the patient  · Continue Gleevec 600 mg p.o. daily  · Discontinue Hydrea due to platelets normalizing on Gleevec  · Weekly labs CBC and CMP  · Request additional records from [facility]  · Discussed bone marrow results with Dr. Lerma and she would like BCR ABL1 kinase testing on the bone marrow added.  Per [facility] they are unable to do this.  Reordered BCR able 1 testing on peripheral blood and it is pending.  · Informed patient that she will be seen on a weekly basis for now  · Weaned off Elavil since anxiety not improved  ·  Celexa 10 mg p.o. every morning prescribed by PCP  · Reviewed her EKG completed on 3/28/2022  · Monitor CBC closely  • Continue antiemetics  • GI consult for continued nausea and vomiting and reported dark emesis  • Continue promethazine and ondansetron and add PPI  • Last EKG showed normal QT  • Due to worsening anxiety increase Xanax to 0.5 mg p.o. nightly as needed number 30 pills.  Continue this for now.  • All questions answered  • Continue to follow-up with PCP per routine  • Follow-up with pain management per routine  • Follow-up with Dr. Lerma in 1 week as previously  scheduled    I spent 20 total minutes, face-to-face, caring for Hope today.  90% of this time involved counseling and/or coordination of care as documented within this note.

## 2023-01-26 ENCOUNTER — OFFICE VISIT (OUTPATIENT)
Dept: ONCOLOGY | Facility: CLINIC | Age: 48
End: 2023-01-26
Payer: MEDICARE

## 2023-01-26 ENCOUNTER — LAB (OUTPATIENT)
Dept: LAB | Facility: HOSPITAL | Age: 48
End: 2023-01-26
Payer: MEDICARE

## 2023-01-26 VITALS
TEMPERATURE: 98.2 F | DIASTOLIC BLOOD PRESSURE: 85 MMHG | WEIGHT: 143.4 LBS | BODY MASS INDEX: 24.48 KG/M2 | HEART RATE: 105 BPM | OXYGEN SATURATION: 96 % | SYSTOLIC BLOOD PRESSURE: 137 MMHG | HEIGHT: 64 IN

## 2023-01-26 DIAGNOSIS — C92.10 BLAST CRISIS PHASE OF CHRONIC MYELOID LEUKEMIA: Primary | ICD-10-CM

## 2023-01-26 DIAGNOSIS — C92.10 CML (CHRONIC MYELOCYTIC LEUKEMIA): Primary | ICD-10-CM

## 2023-01-26 LAB
ALBUMIN SERPL-MCNC: 4.4 G/DL (ref 3.5–5.2)
ALBUMIN/GLOB SERPL: 1.3 G/DL
ALP SERPL-CCNC: 689 U/L (ref 39–117)
ALT SERPL W P-5'-P-CCNC: 33 U/L (ref 1–33)
ANION GAP SERPL CALCULATED.3IONS-SCNC: 12 MMOL/L (ref 5–15)
AST SERPL-CCNC: 30 U/L (ref 1–32)
BASOPHILS NFR BLD AUTO: ABNORMAL %
BILIRUB SERPL-MCNC: 1.6 MG/DL (ref 0–1.2)
BUN SERPL-MCNC: 19 MG/DL (ref 6–20)
BUN/CREAT SERPL: 30.2 (ref 7–25)
CALCIUM SPEC-SCNC: 9.6 MG/DL (ref 8.6–10.5)
CHLORIDE SERPL-SCNC: 100 MMOL/L (ref 98–107)
CO2 SERPL-SCNC: 25 MMOL/L (ref 22–29)
CREAT SERPL-MCNC: 0.63 MG/DL (ref 0.57–1)
DEPRECATED RDW RBC AUTO: 66.1 FL (ref 37–54)
EGFRCR SERPLBLD CKD-EPI 2021: 110.3 ML/MIN/1.73
EOSINOPHIL # BLD AUTO: 0.31 10*3/MM3 (ref 0–0.4)
EOSINOPHIL NFR BLD AUTO: 1.7 % (ref 0.3–6.2)
ERYTHROCYTE [DISTWIDTH] IN BLOOD BY AUTOMATED COUNT: 21.8 % (ref 12.3–15.4)
GLOBULIN UR ELPH-MCNC: 3.5 GM/DL
GLUCOSE SERPL-MCNC: 227 MG/DL (ref 65–99)
HCT VFR BLD AUTO: 34.1 % (ref 34–46.6)
HGB BLD-MCNC: 10.4 G/DL (ref 12–15.9)
HOLD SPECIMEN: NORMAL
LYMPHOCYTES # BLD AUTO: 2.29 10*3/MM3 (ref 0.7–3.1)
LYMPHOCYTES NFR BLD AUTO: 12.9 % (ref 19.6–45.3)
MCH RBC QN AUTO: 26.6 PG (ref 26.6–33)
MCHC RBC AUTO-ENTMCNC: 30.5 G/DL (ref 31.5–35.7)
MCV RBC AUTO: 87.2 FL (ref 79–97)
MONOCYTES # BLD AUTO: 1.23 10*3/MM3 (ref 0.1–0.9)
MONOCYTES NFR BLD AUTO: 6.9 % (ref 5–12)
NEUTROPHILS NFR BLD AUTO: ABNORMAL %
PLATELET # BLD AUTO: 425 10*3/MM3 (ref 140–450)
PMV BLD AUTO: 9.9 FL (ref 6–12)
POTASSIUM SERPL-SCNC: 4.4 MMOL/L (ref 3.5–5.2)
PROT SERPL-MCNC: 7.9 G/DL (ref 6–8.5)
RBC # BLD AUTO: 3.91 10*6/MM3 (ref 3.77–5.28)
SODIUM SERPL-SCNC: 137 MMOL/L (ref 136–145)
WBC NRBC COR # BLD: 17.75 10*3/MM3 (ref 3.4–10.8)

## 2023-01-26 PROCEDURE — 36415 COLL VENOUS BLD VENIPUNCTURE: CPT

## 2023-01-26 PROCEDURE — 80053 COMPREHEN METABOLIC PANEL: CPT | Performed by: NURSE PRACTITIONER

## 2023-01-26 PROCEDURE — 99213 OFFICE O/P EST LOW 20 MIN: CPT | Performed by: NURSE PRACTITIONER

## 2023-01-26 PROCEDURE — 85025 COMPLETE CBC W/AUTO DIFF WBC: CPT | Performed by: NURSE PRACTITIONER

## 2023-01-27 ENCOUNTER — TELEPHONE (OUTPATIENT)
Dept: ONCOLOGY | Facility: CLINIC | Age: 48
End: 2023-01-27

## 2023-01-27 ENCOUNTER — SPECIALTY PHARMACY (OUTPATIENT)
Dept: PHARMACY | Facility: HOSPITAL | Age: 48
End: 2023-01-27
Payer: MEDICARE

## 2023-01-27 NOTE — TELEPHONE ENCOUNTER
Caller: Sayra Cabezas    Relationship to patient: Mother    Best call back number: [phone]    Chief complaint: HAS ANOTHER APPT SAME TIME SAME DAY NEEDING LATER APPT TIME     Type of visit: LAB AND FOLLOW UP     Requested date: 02/03 AROUND 11, 11:30 OR 12    If rescheduling, when is the original appointment: 02/03    Additional notes:

## 2023-01-27 NOTE — PROGRESS NOTES
Specialty Pharmacy Note: Gleevec    Spoke with funding dept at [id number]. They will review pt case today and will contact pt to set up shipment likely today if approved.        1/26/2023   WBC 3.40 - 10.80 10*3/mm3 17.75 (A)   Hemoglobin 12.0 - 15.9 g/dL 10.4 (A)   Hematocrit 34.0 - 46.6 % 34.1   Platelets 140 - 450 10*3/mm3 425   Creatinine 0.57 - 1.00 mg/dL 0.63   BUN 6 - 20 mg/dL 19   Sodium 136 - 145 mmol/L 137   Potassium 3.5 - 5.2 mmol/L 4.4   Glucose 65 - 99 mg/dL 227 (A)   Calcium 8.6 - 10.5 mg/dL 9.6   Albumin 3.5 - 5.2 g/dL 4.4   Total Protein 6.0 - 8.5 g/dL 7.9   AST (SGOT) 1 - 32 U/L 30   ALT (SGPT) 1 - 33 U/L 33   Alkaline Phosphatase 39 - 117 U/L 689 (A)   Total Bilirubin 0.0 - 1.2 mg/dL 1.6 (A)     Pharmacy will continue to follow.        Thank you,  Amy Aldridge, Pharm.D.

## 2023-01-30 ENCOUNTER — DOCUMENTATION (OUTPATIENT)
Dept: PHARMACY | Facility: HOSPITAL | Age: 48
End: 2023-01-30
Payer: MEDICARE

## 2023-01-30 NOTE — PROGRESS NOTES
Specialty Pharmacy Note     Nieves approved for financial assistance with [id number] for her imatinib. Talk to Sayra (mother) and she had already schedule for delivery. Estimated arrival date is Wednesday 2/1/23 by LUIS Anton - CARE COORDINATOR  1/30/2023  09:17 EST

## 2023-02-03 ENCOUNTER — OFFICE (OUTPATIENT)
Dept: URBAN - METROPOLITAN AREA CLINIC 64 | Facility: CLINIC | Age: 48
End: 2023-02-03

## 2023-02-03 ENCOUNTER — LAB (OUTPATIENT)
Dept: LAB | Facility: HOSPITAL | Age: 48
End: 2023-02-03
Payer: MEDICARE

## 2023-02-03 ENCOUNTER — OFFICE VISIT (OUTPATIENT)
Dept: ONCOLOGY | Facility: CLINIC | Age: 48
End: 2023-02-03
Payer: MEDICARE

## 2023-02-03 VITALS
HEART RATE: 111 BPM | WEIGHT: 150 LBS | SYSTOLIC BLOOD PRESSURE: 139 MMHG | DIASTOLIC BLOOD PRESSURE: 79 MMHG | RESPIRATION RATE: 22 BRPM | OXYGEN SATURATION: 97 % | BODY MASS INDEX: 25.61 KG/M2 | TEMPERATURE: 97 F | HEIGHT: 64 IN

## 2023-02-03 VITALS
HEIGHT: 64 IN | DIASTOLIC BLOOD PRESSURE: 86 MMHG | SYSTOLIC BLOOD PRESSURE: 128 MMHG | WEIGHT: 150 LBS | HEART RATE: 112 BPM

## 2023-02-03 DIAGNOSIS — R18.8 OTHER ASCITES: ICD-10-CM

## 2023-02-03 DIAGNOSIS — C92.10 CHRONIC MYELOID LEUKEMIA, BCR/ABL-POSITIVE, NOT HAVING ACHIE: ICD-10-CM

## 2023-02-03 DIAGNOSIS — K92.0 HEMATEMESIS: ICD-10-CM

## 2023-02-03 DIAGNOSIS — C92.00 ACUTE MYELOID LEUKEMIA NOT HAVING ACHIEVED REMISSION: ICD-10-CM

## 2023-02-03 DIAGNOSIS — R63.4 ABNORMAL WEIGHT LOSS: ICD-10-CM

## 2023-02-03 DIAGNOSIS — C95.90 LEUKEMIA NOT HAVING ACHIEVED REMISSION, UNSPECIFIED LEUKEMIA TYPE: ICD-10-CM

## 2023-02-03 DIAGNOSIS — D64.81 ANEMIA DUE TO ANTINEOPLASTIC CHEMOTHERAPY: ICD-10-CM

## 2023-02-03 DIAGNOSIS — Z12.11 ENCOUNTER FOR SCREENING FOR MALIGNANT NEOPLASM OF COLON: ICD-10-CM

## 2023-02-03 DIAGNOSIS — E11.9 TYPE 2 DIABETES MELLITUS WITHOUT COMPLICATIONS: ICD-10-CM

## 2023-02-03 DIAGNOSIS — D64.9 ANEMIA, UNSPECIFIED TYPE: ICD-10-CM

## 2023-02-03 DIAGNOSIS — R10.84 GENERALIZED ABDOMINAL PAIN: ICD-10-CM

## 2023-02-03 DIAGNOSIS — Z12.11 SPECIAL SCREENING FOR MALIGNANT NEOPLASMS, COLON: ICD-10-CM

## 2023-02-03 DIAGNOSIS — C92.10 CML (CHRONIC MYELOCYTIC LEUKEMIA): Primary | ICD-10-CM

## 2023-02-03 DIAGNOSIS — R11.2 NAUSEA AND VOMITING, UNSPECIFIED VOMITING TYPE: ICD-10-CM

## 2023-02-03 DIAGNOSIS — C92.10 BLAST CRISIS PHASE OF CHRONIC MYELOID LEUKEMIA: Primary | ICD-10-CM

## 2023-02-03 DIAGNOSIS — R11.2 NAUSEA WITH VOMITING, UNSPECIFIED: ICD-10-CM

## 2023-02-03 DIAGNOSIS — D64.9 ANEMIA, UNSPECIFIED: ICD-10-CM

## 2023-02-03 DIAGNOSIS — K92.0 COFFEE GROUND EMESIS: ICD-10-CM

## 2023-02-03 DIAGNOSIS — C92.10 CML (CHRONIC MYELOCYTIC LEUKEMIA): ICD-10-CM

## 2023-02-03 DIAGNOSIS — F11.90 OPIOID USE, UNSPECIFIED, UNCOMPLICATED: ICD-10-CM

## 2023-02-03 DIAGNOSIS — R16.2 HEPATOMEGALY WITH SPLENOMEGALY, NOT ELSEWHERE CLASSIFIED: ICD-10-CM

## 2023-02-03 LAB
ALBUMIN SERPL-MCNC: 4.3 G/DL (ref 3.5–5.2)
ALBUMIN/GLOB SERPL: 1.4 G/DL
ALP SERPL-CCNC: 848 U/L (ref 39–117)
ALT SERPL W P-5'-P-CCNC: 24 U/L (ref 1–33)
ANION GAP SERPL CALCULATED.3IONS-SCNC: 12 MMOL/L (ref 5–15)
AST SERPL-CCNC: 30 U/L (ref 1–32)
BASOPHILS NFR BLD AUTO: ABNORMAL %
BILIRUB SERPL-MCNC: 1.1 MG/DL (ref 0–1.2)
BUN SERPL-MCNC: 26 MG/DL (ref 6–20)
BUN/CREAT SERPL: 24.1 (ref 7–25)
CALCIUM SPEC-SCNC: 8.6 MG/DL (ref 8.6–10.5)
CHLORIDE SERPL-SCNC: 102 MMOL/L (ref 98–107)
CO2 SERPL-SCNC: 22 MMOL/L (ref 22–29)
CREAT SERPL-MCNC: 1.08 MG/DL (ref 0.57–1)
DEPRECATED RDW RBC AUTO: 65.3 FL (ref 37–54)
EGFRCR SERPLBLD CKD-EPI 2021: 63.9 ML/MIN/1.73
EOSINOPHIL # BLD AUTO: 0.56 10*3/MM3 (ref 0–0.4)
EOSINOPHIL NFR BLD AUTO: 1.7 % (ref 0.3–6.2)
ERYTHROCYTE [DISTWIDTH] IN BLOOD BY AUTOMATED COUNT: 22.2 % (ref 12.3–15.4)
GLOBULIN UR ELPH-MCNC: 3 GM/DL
GLUCOSE SERPL-MCNC: 325 MG/DL (ref 65–99)
HCT VFR BLD AUTO: 30.2 % (ref 34–46.6)
HGB BLD-MCNC: 9.2 G/DL (ref 12–15.9)
HOLD SPECIMEN: NORMAL
INR PPP: 1.17 (ref 0.93–1.1)
LYMPHOCYTES NFR BLD AUTO: ABNORMAL %
MCH RBC QN AUTO: 26.4 PG (ref 26.6–33)
MCHC RBC AUTO-ENTMCNC: 30.5 G/DL (ref 31.5–35.7)
MCV RBC AUTO: 86.5 FL (ref 79–97)
MONOCYTES # BLD AUTO: 5.41 10*3/MM3 (ref 0.1–0.9)
MONOCYTES NFR BLD AUTO: 16.8 % (ref 5–12)
NEUTROPHILS NFR BLD AUTO: ABNORMAL %
PLATELET # BLD AUTO: 1290 10*3/MM3 (ref 140–450)
PMV BLD AUTO: 9.9 FL (ref 6–12)
POTASSIUM SERPL-SCNC: 5.2 MMOL/L (ref 3.5–5.2)
PROT SERPL-MCNC: 7.3 G/DL (ref 6–8.5)
PROTHROMBIN TIME: 11.9 SECONDS (ref 9.6–11.7)
RBC # BLD AUTO: 3.49 10*6/MM3 (ref 3.77–5.28)
SODIUM SERPL-SCNC: 136 MMOL/L (ref 136–145)
WBC NRBC COR # BLD: 32.27 10*3/MM3 (ref 3.4–10.8)

## 2023-02-03 PROCEDURE — 85610 PROTHROMBIN TIME: CPT

## 2023-02-03 PROCEDURE — 36415 COLL VENOUS BLD VENIPUNCTURE: CPT

## 2023-02-03 PROCEDURE — 99204 OFFICE O/P NEW MOD 45 MIN: CPT

## 2023-02-03 PROCEDURE — 99215 OFFICE O/P EST HI 40 MIN: CPT | Performed by: INTERNAL MEDICINE

## 2023-02-03 PROCEDURE — 80053 COMPREHEN METABOLIC PANEL: CPT | Performed by: INTERNAL MEDICINE

## 2023-02-03 PROCEDURE — 85025 COMPLETE CBC W/AUTO DIFF WBC: CPT

## 2023-02-03 RX ORDER — PROMETHAZINE HYDROCHLORIDE 12.5 MG/1
50 TABLET ORAL
Qty: 60 | Refills: 3 | Status: ACTIVE
Start: 2023-02-03

## 2023-02-03 NOTE — PROGRESS NOTES
Hematology/Oncology Outpatient Follow Up    PATIENT NAME:Nieves Mc  :1975  MRN: [id number]  PRIMARY CARE PHYSICIAN: Alida Latham APRN  REFERRING PHYSICIAN: Alida Latham, *    Chief Complaint   Patient presents with   • Follow-up     Leukemia not having achieved remission, unspecified leukemia type (HCC)        HISTORY OF PRESENT ILLNESS:      Patient is a 47 y.o. female with PMH significant for CML on treatment with Imatinib.  Patient stated that she typically feels poorly with Imatinib with frequent nausea and vomiting.  Also complained of weakness and fatigue.  Patient presented to ED on 10/22/18 with complaints of an elevated blood sugar around 400.  Stated she felt poorly and has had a productive cough with yellow sputum.  Complained of nausea and stated she was unable to keep any food down anytime she ate.  The patient reported subjective fever without chills.  She stated she had been coughing so hard that she was vomiting.  She denied hematemesis.  Denied diarrhea, constipation or blood in her stool.       Patient’s chest x-ray showed no evidence of acute pulmonary embolus.  The patient has ground-glass opacities throughout the lungs.  There is some air trapping noted which would appear to be   infectious.  Patient was started on antibiotics.      Hem/Onc was consulted on 10/23/18 for co-management of patient with CML.  Patient was seen by Dr. Lerma on 10/12 on previous admission for patient reported CML on Imatinib (Gleevec).  Patient reports she is under the care of Dr. Roach at [facility] in [place].  Patient was seen by Dr. Lerma in May 2018 when patient presented with hematuria, which was attributed to her Imatinib.  Dr. Lerma followed during hospitalization but then patient returned to Dr. Roach for her usual follow up.  She was again seen on 10/12.  Patient is stating she will switch to Dr. Lerma.    · Review of Dr. Roach’s note:  On 18 patient  had BCR-abl which was 61.326.  Patient had been on Imatinib 400 mg daily.  She had presented in March 2018 with WBC of 24, hemoglobin 13.1, platelet count of 1,067,000.  Patient apparently had follow up BCR-abl in August 2018, results are not available for review.  Her bone marrow aspiration and biopsy was also performed at that time is currently not available for review.    · 11/6/18 - .  Uric acid 6.5.  BCR-abl by RT-PCR was measured at 3.36%.    · Due to thrombocytosis, patient was placed on Hydroxyurea 500 mg twice a day on 12/11/18.  Platelet count juana to 900,000.  Patient was noncompliant with CBCs that were recommended.    Patient was asked to return to the office after not being able to reach her.   · 12/27/18 - Bone marrow aspiration and biopsy was consistent with chronic myeloid leukemia.  BCR-abl positive, chronic phase.  ABL kinase mutational analysis is currently pending on the bone marrow.    · 1/3/19 - Repeat CBC, WBC 17, hemoglobin 11.9, platelet count 1,072,000.  Hydroxyurea was increased to 1 gm twice a day with follow up CBC.    · 1/6/19 - Follow up CBC showed progressive increase in platelet to 1.1 million.  Patient was offered admission to the hospital, which she declined.  Hydroxyurea was increased to 1 gm three   times a day as of 1/6/19.   · 1/7/19 - EKG shows sinus tachycardia.   milliseconds.   ·  ABL kinase on peripheral blood was ordered on 1/11/19.  Based on sequence analysis, there was no mutation detected.    · 2/12/19 - Patient was initiated on Tasigna 300 mg twice a day.  · 2/13/19 - Urinalysis was negative for hematuria.   · 2/22/19 -  milliseconds.  · 3/13/19 - EKG with QTC is 413 milliseconds.  Nonspecific changes noted.    · 4/18/19 - EKG showed QTC prolonged to 453 milliseconds.  This is changed from prior.  · 4/18/19 - Free T4 normal at 0.91.  Magnesium was 1.7.  BUN is 6.  Creatinine 0.7.  Bilirubin 2.2.  Phosphorous normal 3.7.  TSH 0.43, normal.  Free  T3 was normal at 3.47.  Ionized calcium   normal at 1.23.  BCR-abl was 0.522%.    · 5/7/19 - EKG showed QTC of 441 milliseconds.  QT was 366.     · Patient has been lost to follow-up since 2019 for CML.  Patient states that she lost her insurance.  She also does not have any primary care physician at this time.  She is diabetic on insulin.  · Patient was recently admitted to the hospital for pneumonia due to COVID-19 infection.  At that time patient made a decision to become compliant with treatment.  She is currently on to [employer] 300 mg p.o. twice a day.  She is also on hydroxyurea 1 g twice a day.  Overall she feels better, she has more energy.  · 3/1/2022 white count is 53.92, hemoglobin 11.7 and platelets are 472    · 6/1/2022: Patient has not been seen since March 2022.  Also verified that she has not been taking to Tasigna since February 2022.  She comes in today with cough for 1 and half months, shortness of breath, denies fevers.  · 6/30/2022 patient had 2D echocardiogram which showed an EF of 41 to 45%.  Left ventricular cavity is mildly dilated.  She was diagnosed with nonischemic cardiomyopathy    · 11/18/2022: Patient was transferred from [facility] to [facility] due to cardiac failure.  She was then seen by NP [facility] hematology department.  Patient underwent tumor aspiration and biopsy at that time did not show chronic myeloid leukemia in accelerated phase markedly hypercellular marrow with megakaryocytic and myeloid hyperplasia with atypia and increased basophils blasts are not increased less than 1% moderate reticulin fibrosis.  According to the patient hydroxyurea was discontinued she was prescribed Gleevec 600 mg daily but she has only been taking 400 mg as she cannot find other milligram tablets.  She is already been pump inserted into her pelvic area.  She continues to experience nausea which resulted in her not taking the baclofen she should.  · 1/12/2023: WBC 17.64,  hemoglobin 10.2, MCV 88.8, platelets 458,000.  On Gleevec 600 mg daily and hydroxyurea 500 mg twice daily.  · 2023: WBC 10.67, hemoglobin 10.0, MCV 86.4, platelets 370,000.  Patient on Gleevec 600 mg daily and hydroxyurea 500 mg once a day.  Patient instructed at the visit to discontinue her hydroxyurea.  · 2023: WBC 17.75, hemoglobin 10.4, MCV 87.2, platelets 425,000.  On Gleevec 600 mg daily.    Past Medical History:   Diagnosis Date   • Bone pain    • COVID-19 virus infection 2022   • Diabetes mellitus (HCC)    • Extremity pain     Carlin. legs pain   • Leg pain     left leg greater   • Migraine    • Pulmonary embolism (HCC)    • Vision loss     doing surgery       Past Surgical History:   Procedure Laterality Date   • BONE MARROW BIOPSY     • BREAST SURGERY     • BRONCHOSCOPY N/A 2022    Procedure: BRONCHOSCOPY bilateral lung washing;  Surgeon: Charlene Camara MD;  Location: [facility] ENDOSCOPY;  Service: Pulmonary;  Laterality: N/A;  post: rule out infection vs transfusion lung injury   •  SECTION     • CHOLECYSTECTOMY     • EYE SURGERY      laser surgery due  to hemmorage--- 2021-- another surgery  lt eye11/15/21   • RETINAL DETACHMENT SURGERY     • SPINE SURGERY      Lombardi spinal block   • TUBAL ABDOMINAL LIGATION           Current Outpatient Medications:   •  acetaminophen (TYLENOL) 325 MG tablet, 650 mg., Disp: , Rfl:   •  allopurinol (ZYLOPRIM) 100 MG tablet, Take 1 tablet by mouth 2 (Two) Times a Day., Disp: 30 tablet, Rfl: 0  •  allopurinol (ZYLOPRIM) 300 MG tablet, 300 mg., Disp: , Rfl:   •  ALPRAZolam (Xanax) 0.5 MG tablet, Take 1 tablet by mouth At Night As Needed for Anxiety., Disp: 30 tablet, Rfl: 0  •  budesonide-formoterol (SYMBICORT) 160-4.5 MCG/ACT inhaler, Inhale 2 puffs 2 (Two) Times a Day., Disp: 10.2 g, Rfl: 1  •  carvedilol (COREG) 6.25 MG tablet, Take 1 tablet by mouth 2 (Two) Times a Day With Meals for 30 days., Disp: 60 tablet, Rfl: 0  •  citalopram (CeleXA) 10  MG tablet, Take 1 tablet by mouth Daily. To begin 1/31/22, Disp: 30 tablet, Rfl: 2  •  dapagliflozin Propanediol (Farxiga) 10 MG tablet, 10 mg., Disp: , Rfl:   •  folic acid (FOLVITE) 1 MG tablet, Take 1 tablet by mouth Daily., Disp: 30 tablet, Rfl: 0  •  furosemide (LASIX) 20 MG tablet, Take 20 mg by mouth Daily., Disp: , Rfl:   •  gabapentin (NEURONTIN) 300 MG capsule, 600 mg., Disp: , Rfl:   •  hydroxyurea (HYDREA) 500 MG capsule, Take 500 mg by mouth 2 (Two) Times a Day., Disp: 60 capsule, Rfl: 3  •  hydroxyurea (HYDREA) 500 MG capsule, Take 500 mg by mouth 2 (Two) Times a Day. Starting 1/5, Disp: , Rfl:   •  hydrOXYzine (ATARAX) 25 MG tablet, Take 25 mg by mouth Every 8 (Eight) Hours As Needed., Disp: , Rfl:   •  imatinib (GLEEVEC) 100 MG chemo tablet, Take 2 tablets by mouth with 1 other imatinib prescription for 600 mg total Daily. Take with food and large glass of water; Do not take with Grapefruit juice., Disp: 60 tablet, Rfl: 2  •  imatinib (GLEEVEC) 400 MG chemo tablet, Take 1 tablet by mouth with 1 other imatinib prescription for 600 mg total Daily. Take with food and large glass of water; Do not take with Grapefruit juice., Disp: 30 tablet, Rfl: 2  •  Insulin Glargine (BASAGLAR KWIKPEN) 100 UNIT/ML injection pen, Inject 20 Units under the skin into the appropriate area as directed Daily., Disp: 10 mL, Rfl: 3  •  Insulin Glargine, 1 Unit Dial, (TOUJEO) 300 UNIT/ML solution pen-injector injection,  20 Units 0.2 mL, SubCutaneous, Inj, Daily, 0 Refill(s), Disp: , Rfl:   •  Insulin Lispro (ADMELOG SOLOSTAR) 100 UNIT/ML injection pen, Inject 6 Units under the skin into the appropriate area as directed 3 (Three) Times a Day., Disp: 3 mL, Rfl: 3  •  losartan (COZAAR) 25 MG tablet, Take 1 tablet by mouth Daily., Disp: 30 tablet, Rfl: 0  •  metoprolol succinate XL (TOPROL-XL) 25 MG 24 hr tablet, 25 mg., Disp: , Rfl:   •  omeprazole (priLOSEC) 40 MG capsule, Take 1 capsule by mouth Daily., Disp: 90 capsule, Rfl:  0  •  ondansetron ODT (ZOFRAN-ODT) 8 MG disintegrating tablet, Place 1 tablet on the tongue Every 8 (Eight) Hours As Needed for Nausea or Vomiting., Disp: 20 tablet, Rfl: 2  •  oxymetazoline (AFRIN) 0.05 % nasal spray,  2 Spray, Nostrils Both, Spray, BID, 0 Refill(s), Disp: , Rfl:   •  promethazine (PHENERGAN) 12.5 MG tablet, TAKE 1 TABLET BY MOUTH EVERY 4 HOURS as needed FOR nausea and/or vomiting, Disp: , Rfl:   •  sacubitril-valsartan (Entresto) 24-26 MG tablet,  1 Tab, Oral, Tab, BID, # 60 Tab, 0 Refill(s), Disp: , Rfl:     Allergies   Allergen Reactions   • Hydromorphone GI Intolerance     dilaudid   • Morphine Nausea And Vomiting   • Propofol Hives     Previously tolerated being premedicated with diphenhydramine and famotadine       Family History   Problem Relation Age of Onset   • Diabetes Mother    • Diabetes Maternal Grandmother    • Heart attack Maternal Grandmother    • Stroke Maternal Grandmother        Cancer-related family history is not on file.    Social History     Tobacco Use   • Smoking status: Never   • Smokeless tobacco: Never   Vaping Use   • Vaping Use: Never used   Substance Use Topics   • Alcohol use: No   • Drug use: No     I have reviewed and confirmed the accuracy of the patient's history: Chief complaint, HPI, ROS and Subjective as entered by the MA/LPN/RN. Meghann Lerma MD 02/03/23     SUBJECTIVE:    Patient denies any new issues.  States she is scheduled for EGD colonoscopy a week from now 2/10/2023        REVIEW OF SYSTEMS:    Review of Systems   Constitutional: Negative for chills and fever.   HENT: Negative for ear pain, mouth sores, nosebleeds and sore throat.    Eyes: Negative for photophobia and visual disturbance.   Respiratory: Negative for wheezing and stridor.    Cardiovascular: Negative for chest pain and palpitations.   Gastrointestinal: Negative for abdominal pain, diarrhea, nausea and vomiting.   Endocrine: Negative for cold intolerance and heat intolerance.  "  Genitourinary: Negative for dysuria and hematuria.   Musculoskeletal: Negative for joint swelling and neck stiffness.   Skin: Negative for color change and rash.   Neurological: Negative for seizures and syncope.   Hematological: Negative for adenopathy.        No obvious bleeding   Psychiatric/Behavioral: Negative for agitation, confusion and hallucinations.       OBJECTIVE:    Vitals:    02/03/23 1149   BP: 139/79   Pulse: 111   Resp: 22   Temp: 97 °F (36.1 °C)   TempSrc: Infrared   SpO2: 97%   Weight: 68 kg (150 lb)   Height: 162.6 cm (64\")   PainSc:   8   PainLoc: Generalized     Body mass index is 25.75 kg/m².    ECOG  (1) Restricted in physically strenuous activity, ambulatory and able to do work of light nature    Physical Exam  Vitals and nursing note reviewed.   Constitutional:       General: She is not in acute distress.     Appearance: She is not diaphoretic.   HENT:      Head: Normocephalic and atraumatic.   Eyes:      General: No scleral icterus.        Right eye: No discharge.         Left eye: No discharge.      Conjunctiva/sclera: Conjunctivae normal.   Neck:      Thyroid: No thyromegaly.   Cardiovascular:      Rate and Rhythm: Normal rate and regular rhythm.      Heart sounds: Normal heart sounds. No friction rub. No gallop.    Pulmonary:      Effort: Pulmonary effort is normal. No respiratory distress.      Breath sounds: No stridor. No wheezing.   Abdominal:      General: Bowel sounds are normal.      Palpations: Abdomen is soft. There is no mass.      Tenderness: There is no abdominal tenderness. There is no guarding or rebound.   Musculoskeletal:         General: No tenderness. Normal range of motion.      Cervical back: Normal range of motion and neck supple.   Lymphadenopathy:      Cervical: No cervical adenopathy.   Skin:     General: Skin is warm.      Findings: No erythema or rash.   Neurological:      Mental Status: She is alert and oriented to person, place, and time.      Motor: No " abnormal muscle tone.   Psychiatric:         Behavior: Behavior normal.       I have reexamined the patient and the results are consistent with the previously documented exam. Meghannroseanne Lerma MD       RECENT LABS    WBC   Date Value Ref Range Status   02/03/2023 32.27 (C) 3.40 - 10.80 10*3/mm3 Final     RBC   Date Value Ref Range Status   02/03/2023 3.49 (L) 3.77 - 5.28 10*6/mm3 Final     Hemoglobin   Date Value Ref Range Status   02/03/2023 9.2 (L) 12.0 - 15.9 g/dL Final     Hematocrit   Date Value Ref Range Status   02/03/2023 30.2 (L) 34.0 - 46.6 % Final     MCV   Date Value Ref Range Status   02/03/2023 86.5 79.0 - 97.0 fL Final     MCH   Date Value Ref Range Status   02/03/2023 26.4 (L) 26.6 - 33.0 pg Final     MCHC   Date Value Ref Range Status   02/03/2023 30.5 (L) 31.5 - 35.7 g/dL Final     RDW   Date Value Ref Range Status   02/03/2023 22.2 (H) 12.3 - 15.4 % Final     RDW-SD   Date Value Ref Range Status   02/03/2023 65.3 (H) 37.0 - 54.0 fl Final     MPV   Date Value Ref Range Status   02/03/2023 9.9 6.0 - 12.0 fL Final     Platelets   Date Value Ref Range Status   02/03/2023 1,290 (C) 140 - 450 10*3/mm3 Final     Neutrophil %   Date Value Ref Range Status   01/19/2023 70.2 42.7 - 76.0 % Final     Lymphocyte %   Date Value Ref Range Status   01/26/2023 12.9 (L) 19.6 - 45.3 % Final     Monocyte %   Date Value Ref Range Status   02/03/2023 16.8 (H) 5.0 - 12.0 % Final     Eosinophil %   Date Value Ref Range Status   02/03/2023 1.7 0.3 - 6.2 % Final     Basophil %   Date Value Ref Range Status   01/19/2023 3.7 (H) 0.0 - 1.5 % Final     External Neutrophils Abs   Date Value Ref Range Status   11/30/2022 7.6 (H) 1.7 - 6.0 x10(3)/ul Final     Neutrophils, Absolute   Date Value Ref Range Status   01/19/2023 7.49 (H) 1.70 - 7.00 10*3/mm3 Final     Lymphocytes, Absolute   Date Value Ref Range Status   01/26/2023 2.29 0.70 - 3.10 10*3/mm3 Final     Monocytes, Absolute   Date Value Ref Range Status   02/03/2023  5.41 (H) 0.10 - 0.90 10*3/mm3 Final     Eosinophils, Absolute   Date Value Ref Range Status   02/03/2023 0.56 (H) 0.00 - 0.40 10*3/mm3 Final     Basophils, Absolute   Date Value Ref Range Status   01/19/2023 0.40 (H) 0.00 - 0.20 10*3/mm3 Final     nRBC   Date Value Ref Range Status   11/11/2022 2.0 (H) 0.0 - 0.2 /100 WBC Final   10/16/2019 0.2 0.0 - 0.2 /100 WBC Final       Lab Results   Component Value Date    GLUCOSE 227 (H) 01/26/2023    BUN 19 01/26/2023    CREATININE 0.63 01/26/2023    EGFRIFNONA 133 02/02/2022    BCR 30.2 (H) 01/26/2023    K 4.4 01/26/2023    CO2 25.0 01/26/2023    CALCIUM 9.6 01/26/2023    ALBUMIN 4.4 01/26/2023    LABIL2 1.0 04/18/2019    AST 30 01/26/2023    ALT 33 01/26/2023           ASSESSMENT    · Accelerated phase CML, status post bone marrow biopsy on 11/3/2022.  Currently on Gleevec.  Would resume current dose of 600 mg daily.  · Due to significant thrombocytosis after hydroxyurea was discontinued, we will go ahead and restart hydroxyurea 500 mg twice a day with repeat CBC next week.  · BCR ABL kinase mutation assay is still pending  · We will request for her additional records from [facility]  · Pain management, status post pain pump insertion.  She will follow-up with pain management at [place] or [facility].  Her pain management seems to be adequate  · Noncompliant with TKI's for her CML.  Reviewed  · Recent cardiomyopathy diagnosis with EF around 26 to 30% nonischemic.  Follows up with Dr. Ruiz with cardiology services  · CML in chronic phase with no significant hematologic or molecular or cytogenetic response on   Imatinib.  ABL kinase mutational analysis was negative.  We  have represcribed to Tasigna 300 mg twice a day.  Patient has been noncompliant with treatments and ended up in the hospital with hyperleukocytosis, peripheral blood blasts.  Bone marrow biopsy suggest that she remains in chronic phase.  Continue to Tasiigna at the current  doses.  Hydroxyurea has been discontinued.  · Uncontrolled diabetes type 1, followed at the [facility] by Dr. Calles  · Chronic anemia: Stable, multifactorial from CML, to Tasigna, hydroxyurea.  This has improved  · Insomnia  · Situational anxiety, not suicidal, on Celexa.  Will increase Xanax.  · Hyperleukocytosis secondary to CML  · History of medical noncompliance  · Pulmonary embolism: Xarelto restarted  · Recent COVID-19 pneumonia  · History of prolonged QTC        Plans    · Reviewed CBC with patient  · Continue Gleevec 600 mg p.o. daily  · Restart Hydrea due to platelets normalizing on Gleevec.  Repeat CBC next week to assess to see if platelets are beginning to fall  · Retrieve results of her BCR ABL kinase mutational analysis  · Request additional records from [facility]  · Discussed bone marrow results with Dr. Lerma and she would like BCR ABL1 kinase testing on the bone marrow added.  Per [facility] they are unable to do this.  Reordered BCR able 1 testing on peripheral blood and it is pending.  · Informed patient that she will be seen on a weekly basis for now  · Weaned off Elavil since anxiety not improved  ·  Celexa 10 mg p.o. every morning prescribed by PCP  · Reviewed her EKG completed on 3/28/2022  · Monitor CBC closely  • Continue antiemetics  • GI consult for continued nausea and vomiting and reported dark emesis.  She had EGD and colonoscopy coming up  • Continue promethazine and ondansetron and add PPI  • Last EKG showed normal QT  • Due to worsening anxiety increase Xanax to 0.5 mg p.o. nightly as needed number 30 pills.  Continue this for now.  • All questions answered  • Continue to follow-up with PCP per routine  • Follow-up with pain management per routine  • Follow-up with NP in 2 weeks, CBC next week.  Discuss if we need to switch TKI        I spent 40 total minutes, face-to-face, caring for Hope today.  90% of this time involved counseling and/or  coordination of care as documented within this note.

## 2023-02-06 ENCOUNTER — SPECIALTY PHARMACY (OUTPATIENT)
Dept: PHARMACY | Facility: HOSPITAL | Age: 48
End: 2023-02-06
Payer: MEDICARE

## 2023-02-06 LAB
BCR/ABL1 KINASE DOMAIN MUT ANL BLD/T: NORMAL
LAB DIRECTOR NAME PROVIDER: NORMAL
Lab: NORMAL
NARRATIVE DIAGNOSTIC REPORT-IMP: NORMAL
REF LAB TEST METHOD: NORMAL

## 2023-02-06 NOTE — PROGRESS NOTES
Specialty Pharmacy Note: Gleevec    Hope to continue Gleevec 600mg PO qday per provider dictation on 2/3/23. Patient also to take hydrea due to elevated platelets.  Labs as follow:        2/3/2023   WBC 3.40 - 10.80 10*3/mm3 32.27 (A)   Hemoglobin 12.0 - 15.9 g/dL 9.2 (A)   Hematocrit 34.0 - 46.6 % 30.2 (A)   Platelets 140 - 450 10*3/mm3 1,290 (A)   Creatinine 0.57 - 1.00 mg/dL 1.08 (A)   BUN 6 - 20 mg/dL 26 (A)   Sodium 136 - 145 mmol/L 136   Potassium 3.5 - 5.2 mmol/L 5.2   Glucose 65 - 99 mg/dL 325 (A)   Calcium 8.6 - 10.5 mg/dL 8.6   Albumin 3.5 - 5.2 g/dL 4.3   Total Protein 6.0 - 8.5 g/dL 7.3   AST (SGOT) 1 - 32 U/L 30   ALT (SGPT) 1 - 33 U/L 24   Alkaline Phosphatase 39 - 117 U/L 848 (A)   Total Bilirubin 0.0 - 1.2 mg/dL 1.1   INR 0.93 - 1.10 1.17 (A)   Protime 9.6 - 11.7 Seconds 11.9 (A)     Repeat cbc in one week to see if platelets responding per MD.    Thanks,    Ros CALHOUN, PharmD

## 2023-02-09 ENCOUNTER — LAB (OUTPATIENT)
Dept: LAB | Facility: HOSPITAL | Age: 48
End: 2023-02-09
Payer: MEDICARE

## 2023-02-09 DIAGNOSIS — C92.10 CML (CHRONIC MYELOCYTIC LEUKEMIA): ICD-10-CM

## 2023-02-09 DIAGNOSIS — C95.90 LEUKEMIA NOT HAVING ACHIEVED REMISSION, UNSPECIFIED LEUKEMIA TYPE: ICD-10-CM

## 2023-02-09 LAB
BASOPHILS NFR BLD AUTO: ABNORMAL %
DEPRECATED RDW RBC AUTO: 62 FL (ref 37–54)
EOSINOPHIL # BLD AUTO: 0.38 10*3/MM3 (ref 0–0.4)
EOSINOPHIL NFR BLD AUTO: 1.8 % (ref 0.3–6.2)
ERYTHROCYTE [DISTWIDTH] IN BLOOD BY AUTOMATED COUNT: 20.2 % (ref 12.3–15.4)
HCT VFR BLD AUTO: 31.3 % (ref 34–46.6)
HGB BLD-MCNC: 9.5 G/DL (ref 12–15.9)
LYMPHOCYTES NFR BLD AUTO: ABNORMAL %
MCH RBC QN AUTO: 26.7 PG (ref 26.6–33)
MCHC RBC AUTO-ENTMCNC: 30.4 G/DL (ref 31.5–35.7)
MCV RBC AUTO: 87.9 FL (ref 79–97)
MONOCYTES # BLD AUTO: 1.74 10*3/MM3 (ref 0.1–0.9)
MONOCYTES NFR BLD AUTO: 8.1 % (ref 5–12)
NEUTROPHILS NFR BLD AUTO: ABNORMAL %
PLATELET # BLD AUTO: 1147 10*3/MM3 (ref 140–450)
PMV BLD AUTO: 9.6 FL (ref 6–12)
RBC # BLD AUTO: 3.56 10*6/MM3 (ref 3.77–5.28)
WBC NRBC COR # BLD: 21.37 10*3/MM3 (ref 3.4–10.8)

## 2023-02-09 PROCEDURE — 36415 COLL VENOUS BLD VENIPUNCTURE: CPT

## 2023-02-09 PROCEDURE — 85025 COMPLETE CBC W/AUTO DIFF WBC: CPT

## 2023-02-10 ENCOUNTER — TELEPHONE (OUTPATIENT)
Dept: ONCOLOGY | Facility: CLINIC | Age: 48
End: 2023-02-10
Payer: MEDICARE

## 2023-02-10 ENCOUNTER — SPECIALTY PHARMACY (OUTPATIENT)
Dept: PHARMACY | Facility: HOSPITAL | Age: 48
End: 2023-02-10
Payer: MEDICARE
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
## 2023-02-10 NOTE — PROGRESS NOTES
Specialty Pharmacy Note: Gleevec    Labs reviewed:        2/9/2023   WBC 3.40 - 10.80 10*3/mm3 21.37 (A)   Hemoglobin 12.0 - 15.9 g/dL 9.5 (A)   Hematocrit 34.0 - 46.6 % 31.3 (A)   Platelets 140 - 450 10*3/mm3 1,147 (A)   Per Dr. Lerma due to platelets, Hope to be scheduled for Monday 2/13 to see APRN. Scheduling aware.  Thanks,    Ros CALHOUN, PharmD

## 2023-02-10 NOTE — TELEPHONE ENCOUNTER
Called pt's mother, Sayra, regarding elevated platelet count. I asked if the pt is taking her Hydrea as prescribed, 500 mg BID. Pt's mother stated that she is taking it as directed, but has not been feeling well. I spoke to Dr. Lerma and she advised that the pt increase to 1000 mg BID and follow-up next week. I called Sayra back and relayed this to her. She gave my directions to Hope while I was still on the phone. Hope began screaming that she is already taking 1000 mg twice daily. I explained that she was prescribed 500 mg twice daily. She stated that was what she was taking. I told her that Dr. Lerma would like her to increase to 1000 mg twice daily. She again stated that that was what she was taking. Pt became very irate and was screaming. I advised her mother that I could not help her unless the pt calmed down. After explaining the dose adjustment multiple times, pt and her mother finally verbalized understanding. Appt scheduled with JP Denny on Monday.

## 2023-02-13 ENCOUNTER — APPOINTMENT (OUTPATIENT)
Dept: LAB | Facility: HOSPITAL | Age: 48
End: 2023-02-13
Payer: MEDICARE

## 2023-02-13 ENCOUNTER — OFFICE VISIT (OUTPATIENT)
Dept: ONCOLOGY | Facility: CLINIC | Age: 48
End: 2023-02-13
Payer: MEDICARE

## 2023-02-13 VITALS
BODY MASS INDEX: 24.69 KG/M2 | HEART RATE: 104 BPM | SYSTOLIC BLOOD PRESSURE: 141 MMHG | DIASTOLIC BLOOD PRESSURE: 88 MMHG | HEIGHT: 64 IN | TEMPERATURE: 98.3 F | OXYGEN SATURATION: 98 % | WEIGHT: 144.6 LBS

## 2023-02-13 DIAGNOSIS — C92.10 CML (CHRONIC MYELOCYTIC LEUKEMIA): Primary | ICD-10-CM

## 2023-02-13 DIAGNOSIS — F41.9 ANXIETY: ICD-10-CM

## 2023-02-13 LAB
ALBUMIN SERPL-MCNC: 4.3 G/DL (ref 3.5–5.2)
ALBUMIN/GLOB SERPL: 1.5 G/DL
ALP SERPL-CCNC: 714 U/L (ref 39–117)
ALT SERPL W P-5'-P-CCNC: 20 U/L (ref 1–33)
ANION GAP SERPL CALCULATED.3IONS-SCNC: 11 MMOL/L (ref 5–15)
AST SERPL-CCNC: 17 U/L (ref 1–32)
BACTERIA UR QL AUTO: ABNORMAL /HPF
BASOPHILS NFR BLD AUTO: ABNORMAL %
BILIRUB SERPL-MCNC: 0.9 MG/DL (ref 0–1.2)
BILIRUB UR QL STRIP: NEGATIVE
BUN SERPL-MCNC: 25 MG/DL (ref 6–20)
BUN/CREAT SERPL: 25.8 (ref 7–25)
CALCIUM SPEC-SCNC: 8.8 MG/DL (ref 8.6–10.5)
CHLORIDE SERPL-SCNC: 103 MMOL/L (ref 98–107)
CLARITY UR: ABNORMAL
CO2 SERPL-SCNC: 24 MMOL/L (ref 22–29)
COLOR UR: YELLOW
CREAT SERPL-MCNC: 0.97 MG/DL (ref 0.57–1)
DEPRECATED RDW RBC AUTO: 60.8 FL (ref 37–54)
EGFRCR SERPLBLD CKD-EPI 2021: 72.7 ML/MIN/1.73
EOSINOPHIL # BLD AUTO: 0.2 10*3/MM3 (ref 0–0.4)
EOSINOPHIL NFR BLD AUTO: 1.2 % (ref 0.3–6.2)
ERYTHROCYTE [DISTWIDTH] IN BLOOD BY AUTOMATED COUNT: 20.1 % (ref 12.3–15.4)
GLOBULIN UR ELPH-MCNC: 2.8 GM/DL
GLUCOSE SERPL-MCNC: 275 MG/DL (ref 65–99)
GLUCOSE UR STRIP-MCNC: NEGATIVE MG/DL
HCT VFR BLD AUTO: 29.4 % (ref 34–46.6)
HGB BLD-MCNC: 8.9 G/DL (ref 12–15.9)
HGB UR QL STRIP.AUTO: NEGATIVE
HOLD SPECIMEN: NORMAL
HYALINE CASTS UR QL AUTO: ABNORMAL /LPF
KETONES UR QL STRIP: NEGATIVE
LEUKOCYTE ESTERASE UR QL STRIP.AUTO: ABNORMAL
LYMPHOCYTES # BLD AUTO: 2.84 10*3/MM3 (ref 0.7–3.1)
LYMPHOCYTES NFR BLD AUTO: 16.7 % (ref 19.6–45.3)
MCH RBC QN AUTO: 26 PG (ref 26.6–33)
MCHC RBC AUTO-ENTMCNC: 30.3 G/DL (ref 31.5–35.7)
MCV RBC AUTO: 86 FL (ref 79–97)
MONOCYTES # BLD AUTO: 1.63 10*3/MM3 (ref 0.1–0.9)
MONOCYTES NFR BLD AUTO: 9.6 % (ref 5–12)
NEUTROPHILS NFR BLD AUTO: ABNORMAL %
NITRITE UR QL STRIP: NEGATIVE
PH UR STRIP.AUTO: 5 [PH] (ref 5–8)
PLATELET # BLD AUTO: 1028 10*3/MM3 (ref 140–450)
PMV BLD AUTO: 9.5 FL (ref 6–12)
POTASSIUM SERPL-SCNC: 4.9 MMOL/L (ref 3.5–5.2)
PROT SERPL-MCNC: 7.1 G/DL (ref 6–8.5)
PROT UR QL STRIP: ABNORMAL
RBC # BLD AUTO: 3.42 10*6/MM3 (ref 3.77–5.28)
RBC # UR STRIP: ABNORMAL /HPF
REF LAB TEST METHOD: ABNORMAL
SODIUM SERPL-SCNC: 138 MMOL/L (ref 136–145)
SP GR UR STRIP: 1.02 (ref 1–1.03)
SQUAMOUS #/AREA URNS HPF: ABNORMAL /HPF
UROBILINOGEN UR QL STRIP: ABNORMAL
WBC # UR STRIP: ABNORMAL /HPF
WBC NRBC COR # BLD: 17.04 10*3/MM3 (ref 3.4–10.8)

## 2023-02-13 PROCEDURE — 80053 COMPREHEN METABOLIC PANEL: CPT | Performed by: NURSE PRACTITIONER

## 2023-02-13 PROCEDURE — 81001 URINALYSIS AUTO W/SCOPE: CPT | Performed by: NURSE PRACTITIONER

## 2023-02-13 PROCEDURE — 36415 COLL VENOUS BLD VENIPUNCTURE: CPT | Performed by: NURSE PRACTITIONER

## 2023-02-13 PROCEDURE — 87086 URINE CULTURE/COLONY COUNT: CPT | Performed by: NURSE PRACTITIONER

## 2023-02-13 PROCEDURE — 85025 COMPLETE CBC W/AUTO DIFF WBC: CPT | Performed by: NURSE PRACTITIONER

## 2023-02-13 PROCEDURE — 99214 OFFICE O/P EST MOD 30 MIN: CPT | Performed by: NURSE PRACTITIONER

## 2023-02-13 PROCEDURE — 87186 SC STD MICRODIL/AGAR DIL: CPT | Performed by: NURSE PRACTITIONER

## 2023-02-13 PROCEDURE — 87077 CULTURE AEROBIC IDENTIFY: CPT | Performed by: NURSE PRACTITIONER

## 2023-02-13 RX ORDER — ALPRAZOLAM 0.5 MG/1
0.5 TABLET ORAL NIGHTLY PRN
Qty: 30 TABLET | Refills: 0 | Status: SHIPPED | OUTPATIENT
Start: 2023-02-13 | End: 2023-03-15 | Stop reason: SDUPTHER

## 2023-02-13 RX ORDER — HYDROXYUREA 500 MG/1
1000 CAPSULE ORAL 2 TIMES DAILY
Qty: 120 CAPSULE | Refills: 2 | Status: SHIPPED | OUTPATIENT
Start: 2023-02-13 | End: 2023-03-10

## 2023-02-14 ENCOUNTER — TELEPHONE (OUTPATIENT)
Dept: ONCOLOGY | Facility: CLINIC | Age: 48
End: 2023-02-14
Payer: MEDICARE

## 2023-02-14 ENCOUNTER — SPECIALTY PHARMACY (OUTPATIENT)
Dept: PHARMACY | Facility: HOSPITAL | Age: 48
End: 2023-02-14
Payer: MEDICARE

## 2023-02-14 NOTE — TELEPHONE ENCOUNTER
Received a message from JP Denny stating that the pt needs an EGD/colonoscopy, but it was set up at [facility] and they are requiring $400 up front. She asked that I call the pt and let her know that it can be done at [facility] with no upfront payment. I called the pt's mother, Sayra, and relayed this to her. She verbalized understanding and said that the pt is refusing the colonoscopy, but may be willing to do the EGD if there is no upfront cost. She said she would call back tomorrow and let us know what the pt decides.

## 2023-02-14 NOTE — PROGRESS NOTES
Specialty Pharmacy Note: Gleevec and hydroxyurea          2/13/2023   WBC 3.40 - 10.80 10*3/mm3 17.04 (A)   Hemoglobin 12.0 - 15.9 g/dL 8.9 (A)   Hematocrit 34.0 - 46.6 % 29.4 (A)   Platelets 140 - 450 10*3/mm3 1,028 (A)   Creatinine 0.57 - 1.00 mg/dL 0.97   BUN 6 - 20 mg/dL 25 (A)   Sodium 136 - 145 mmol/L 138   Potassium 3.5 - 5.2 mmol/L 4.9   Glucose 65 - 99 mg/dL 275 (A)   Calcium 8.6 - 10.5 mg/dL 8.8   Albumin 3.5 - 5.2 g/dL 4.3   Total Protein 6.0 - 8.5 g/dL 7.1   AST (SGOT) 1 - 32 U/L 17   ALT (SGPT) 1 - 33 U/L 20   Alkaline Phosphatase 39 - 117 U/L 714 (A)   Total Bilirubin 0.0 - 1.2 mg/dL 0.9     Pts platelets remain elevated. Pt seen by APRN and hydroxyurea dose increased. Pt has repeat labs scheduled for 2/17. Pharmacy will continue to follow.      Thank you,  Amy Aldridge, Pharm.D.

## 2023-02-15 ENCOUNTER — TELEPHONE (OUTPATIENT)
Dept: ONCOLOGY | Facility: CLINIC | Age: 48
End: 2023-02-15
Payer: MEDICARE

## 2023-02-15 DIAGNOSIS — N39.0 URINARY TRACT INFECTION WITHOUT HEMATURIA, SITE UNSPECIFIED: Primary | ICD-10-CM

## 2023-02-15 LAB — BACTERIA SPEC AEROBE CULT: ABNORMAL

## 2023-02-15 RX ORDER — NITROFURANTOIN 25; 75 MG/1; MG/1
100 CAPSULE ORAL 2 TIMES DAILY
Qty: 10 CAPSULE | Refills: 0 | Status: SHIPPED | OUTPATIENT
Start: 2023-02-15 | End: 2023-03-10

## 2023-02-15 NOTE — TELEPHONE ENCOUNTER
----- Message from JP Varela sent at 2/15/2023  1:10 PM EST -----  Please let the patient know that she did grow bacteria on the culture for her urine.  It is E. coli which is common with women.  It is susceptible to Macrobid so I have sent her in a prescription.  Thank you

## 2023-02-15 NOTE — TELEPHONE ENCOUNTER
Called pt and spoke with mother letting her know that the pts Urine Culture came back positive for E. Coli and a prescription was escribed to her pharmacy.  Pts mother v/u.

## 2023-02-17 ENCOUNTER — LAB (OUTPATIENT)
Dept: LAB | Facility: HOSPITAL | Age: 48
End: 2023-02-17
Payer: MEDICARE

## 2023-02-17 ENCOUNTER — SPECIALTY PHARMACY (OUTPATIENT)
Dept: PHARMACY | Facility: HOSPITAL | Age: 48
End: 2023-02-17
Payer: MEDICARE

## 2023-02-17 DIAGNOSIS — C92.10 CML (CHRONIC MYELOCYTIC LEUKEMIA): ICD-10-CM

## 2023-02-17 DIAGNOSIS — C95.90 LEUKEMIA NOT HAVING ACHIEVED REMISSION, UNSPECIFIED LEUKEMIA TYPE: ICD-10-CM

## 2023-02-17 DIAGNOSIS — C92.10 CML (CHRONIC MYELOCYTIC LEUKEMIA): Primary | ICD-10-CM

## 2023-02-17 LAB
ALBUMIN SERPL-MCNC: 4.4 G/DL (ref 3.5–5.2)
ALBUMIN/GLOB SERPL: 1.6 G/DL
ALP SERPL-CCNC: 763 U/L (ref 39–117)
ALT SERPL W P-5'-P-CCNC: 36 U/L (ref 1–33)
ANION GAP SERPL CALCULATED.3IONS-SCNC: 10 MMOL/L (ref 5–15)
AST SERPL-CCNC: 32 U/L (ref 1–32)
BASOPHILS NFR BLD AUTO: ABNORMAL %
BILIRUB SERPL-MCNC: 1 MG/DL (ref 0–1.2)
BUN SERPL-MCNC: 21 MG/DL (ref 6–20)
BUN/CREAT SERPL: 26.3 (ref 7–25)
CALCIUM SPEC-SCNC: 9.2 MG/DL (ref 8.6–10.5)
CHLORIDE SERPL-SCNC: 101 MMOL/L (ref 98–107)
CO2 SERPL-SCNC: 23 MMOL/L (ref 22–29)
CREAT SERPL-MCNC: 0.8 MG/DL (ref 0.57–1)
DEPRECATED RDW RBC AUTO: 59.7 FL (ref 37–54)
EGFRCR SERPLBLD CKD-EPI 2021: 91.6 ML/MIN/1.73
EOSINOPHIL # BLD AUTO: 0.19 10*3/MM3 (ref 0–0.4)
EOSINOPHIL NFR BLD AUTO: 1.5 % (ref 0.3–6.2)
ERYTHROCYTE [DISTWIDTH] IN BLOOD BY AUTOMATED COUNT: 20.2 % (ref 12.3–15.4)
GLOBULIN UR ELPH-MCNC: 2.8 GM/DL
GLUCOSE SERPL-MCNC: 351 MG/DL (ref 65–99)
HCT VFR BLD AUTO: 30 % (ref 34–46.6)
HGB BLD-MCNC: 9.2 G/DL (ref 12–15.9)
LYMPHOCYTES # BLD AUTO: 2.92 10*3/MM3 (ref 0.7–3.1)
LYMPHOCYTES NFR BLD AUTO: 22.7 % (ref 19.6–45.3)
MCH RBC QN AUTO: 26.4 PG (ref 26.6–33)
MCHC RBC AUTO-ENTMCNC: 30.7 G/DL (ref 31.5–35.7)
MCV RBC AUTO: 86.2 FL (ref 79–97)
MONOCYTES # BLD AUTO: 1.27 10*3/MM3 (ref 0.1–0.9)
MONOCYTES NFR BLD AUTO: 9.9 % (ref 5–12)
NEUTROPHILS NFR BLD AUTO: ABNORMAL %
PLATELET # BLD AUTO: 890 10*3/MM3 (ref 140–450)
PMV BLD AUTO: 9.2 FL (ref 6–12)
POTASSIUM SERPL-SCNC: 5.5 MMOL/L (ref 3.5–5.2)
PROT SERPL-MCNC: 7.2 G/DL (ref 6–8.5)
RBC # BLD AUTO: 3.48 10*6/MM3 (ref 3.77–5.28)
SODIUM SERPL-SCNC: 134 MMOL/L (ref 136–145)
WBC NRBC COR # BLD: 12.89 10*3/MM3 (ref 3.4–10.8)

## 2023-02-17 PROCEDURE — 80053 COMPREHEN METABOLIC PANEL: CPT | Performed by: INTERNAL MEDICINE

## 2023-02-17 PROCEDURE — 85025 COMPLETE CBC W/AUTO DIFF WBC: CPT

## 2023-02-17 PROCEDURE — 36415 COLL VENOUS BLD VENIPUNCTURE: CPT

## 2023-02-17 NOTE — PROGRESS NOTES
Specialty Pharmacy Note: Gleevec+Hydrea    Lab review:        2/17/2023   WBC 3.40 - 10.80 10*3/mm3 12.89 (A)   Hemoglobin 12.0 - 15.9 g/dL 9.2 (A)   Hematocrit 34.0 - 46.6 % 30.0 (A)   Platelets 140 - 450 10*3/mm3 890 (A)   Creatinine 0.57 - 1.00 mg/dL 0.80   BUN 6 - 20 mg/dL 21 (A)   Sodium 136 - 145 mmol/L 134 (A)   Potassium 3.5 - 5.2 mmol/L 5.5 (A)   Glucose 65 - 99 mg/dL 351 (A)   Calcium 8.6 - 10.5 mg/dL 9.2   Albumin 3.5 - 5.2 g/dL 4.4   Total Protein 6.0 - 8.5 g/dL 7.2   AST (SGOT) 1 - 32 U/L 32   ALT (SGPT) 1 - 33 U/L 36 (A)   Alkaline Phosphatase 39 - 117 U/L 763 (A)   Total Bilirubin 0.0 - 1.2 mg/dL 1.0   CBC improving with treatment.  Repeat CMP to evaluate elevated potassium on 2/20/23 per Denisha BETTS  Hope is diabetic and followed by endocrinology for glucose control.    Thanks,    Ros CALHOUN, PharmD

## 2023-02-20 ENCOUNTER — OFFICE VISIT (OUTPATIENT)
Dept: ONCOLOGY | Facility: CLINIC | Age: 48
End: 2023-02-20
Payer: MEDICARE

## 2023-02-20 ENCOUNTER — LAB (OUTPATIENT)
Dept: LAB | Facility: HOSPITAL | Age: 48
End: 2023-02-20
Payer: MEDICARE

## 2023-02-20 ENCOUNTER — SPECIALTY PHARMACY (OUTPATIENT)
Dept: PHARMACY | Facility: HOSPITAL | Age: 48
End: 2023-02-20
Payer: MEDICARE

## 2023-02-20 VITALS
HEIGHT: 64 IN | TEMPERATURE: 97 F | RESPIRATION RATE: 18 BRPM | HEART RATE: 111 BPM | SYSTOLIC BLOOD PRESSURE: 126 MMHG | BODY MASS INDEX: 24.75 KG/M2 | DIASTOLIC BLOOD PRESSURE: 77 MMHG | WEIGHT: 145 LBS | OXYGEN SATURATION: 98 %

## 2023-02-20 DIAGNOSIS — E87.5 HYPERKALEMIA: ICD-10-CM

## 2023-02-20 DIAGNOSIS — C92.10 CML (CHRONIC MYELOCYTIC LEUKEMIA): Primary | ICD-10-CM

## 2023-02-20 DIAGNOSIS — R94.31 ABNORMAL EKG: ICD-10-CM

## 2023-02-20 DIAGNOSIS — I50.9 CONGESTIVE HEART FAILURE, UNSPECIFIED HF CHRONICITY, UNSPECIFIED HEART FAILURE TYPE: ICD-10-CM

## 2023-02-20 DIAGNOSIS — E79.0 HYPERURICEMIA: Primary | ICD-10-CM

## 2023-02-20 LAB
ALBUMIN SERPL-MCNC: 4 G/DL (ref 3.5–5.2)
ALP SERPL-CCNC: 632 U/L (ref 39–117)
ALT SERPL W P-5'-P-CCNC: 29 U/L (ref 1–33)
ANION GAP SERPL CALCULATED.3IONS-SCNC: 12 MMOL/L (ref 5–15)
AST SERPL-CCNC: 23 U/L (ref 1–32)
BASOPHILS # BLD AUTO: 0.36 10*3/MM3 (ref 0–0.2)
BASOPHILS NFR BLD AUTO: 4 % (ref 0–1.5)
BILIRUB CONJ SERPL-MCNC: 0.4 MG/DL (ref 0–0.3)
BILIRUB INDIRECT SERPL-MCNC: 0.8 MG/DL
BILIRUB SERPL-MCNC: 1.2 MG/DL (ref 0–1.2)
BUN SERPL-MCNC: 16 MG/DL (ref 6–20)
BUN/CREAT SERPL: 23.5 (ref 7–25)
CALCIUM SPEC-SCNC: 9.3 MG/DL (ref 8.6–10.5)
CHLORIDE SERPL-SCNC: 102 MMOL/L (ref 98–107)
CO2 SERPL-SCNC: 23 MMOL/L (ref 22–29)
CREAT SERPL-MCNC: 0.68 MG/DL (ref 0.57–1)
DEPRECATED RDW RBC AUTO: 59.7 FL (ref 37–54)
EGFRCR SERPLBLD CKD-EPI 2021: 108.3 ML/MIN/1.73
EOSINOPHIL # BLD AUTO: 0.09 10*3/MM3 (ref 0–0.4)
EOSINOPHIL NFR BLD AUTO: 1 % (ref 0.3–6.2)
ERYTHROCYTE [DISTWIDTH] IN BLOOD BY AUTOMATED COUNT: 21 % (ref 12.3–15.4)
GLUCOSE SERPL-MCNC: 259 MG/DL (ref 65–99)
HCT VFR BLD AUTO: 29.8 % (ref 34–46.6)
HGB BLD-MCNC: 9.3 G/DL (ref 12–15.9)
HOLD SPECIMEN: NORMAL
LDH SERPL-CCNC: 432 U/L (ref 135–214)
LYMPHOCYTES # BLD AUTO: 1.97 10*3/MM3 (ref 0.7–3.1)
LYMPHOCYTES NFR BLD AUTO: 21.6 % (ref 19.6–45.3)
MCH RBC QN AUTO: 27 PG (ref 26.6–33)
MCHC RBC AUTO-ENTMCNC: 31.2 G/DL (ref 31.5–35.7)
MCV RBC AUTO: 86.4 FL (ref 79–97)
MONOCYTES # BLD AUTO: 1.05 10*3/MM3 (ref 0.1–0.9)
MONOCYTES NFR BLD AUTO: 11.5 % (ref 5–12)
NEUTROPHILS NFR BLD AUTO: 5.64 10*3/MM3 (ref 1.7–7)
NEUTROPHILS NFR BLD AUTO: 61.9 % (ref 42.7–76)
PLATELET # BLD AUTO: 530 10*3/MM3 (ref 140–450)
PMV BLD AUTO: 9.5 FL (ref 6–12)
POTASSIUM SERPL-SCNC: 5.7 MMOL/L (ref 3.5–5.2)
PROT SERPL-MCNC: 7.1 G/DL (ref 6–8.5)
RBC # BLD AUTO: 3.45 10*6/MM3 (ref 3.77–5.28)
SODIUM SERPL-SCNC: 137 MMOL/L (ref 136–145)
URATE SERPL-MCNC: 9.9 MG/DL (ref 2.4–5.7)
WBC NRBC COR # BLD: 9.11 10*3/MM3 (ref 3.4–10.8)

## 2023-02-20 PROCEDURE — 36415 COLL VENOUS BLD VENIPUNCTURE: CPT

## 2023-02-20 PROCEDURE — 83615 LACTATE (LD) (LDH) ENZYME: CPT | Performed by: INTERNAL MEDICINE

## 2023-02-20 PROCEDURE — 99215 OFFICE O/P EST HI 40 MIN: CPT | Performed by: INTERNAL MEDICINE

## 2023-02-20 PROCEDURE — 80076 HEPATIC FUNCTION PANEL: CPT | Performed by: INTERNAL MEDICINE

## 2023-02-20 PROCEDURE — 84550 ASSAY OF BLOOD/URIC ACID: CPT | Performed by: INTERNAL MEDICINE

## 2023-02-20 PROCEDURE — 80048 BASIC METABOLIC PNL TOTAL CA: CPT | Performed by: INTERNAL MEDICINE

## 2023-02-20 PROCEDURE — 85025 COMPLETE CBC W/AUTO DIFF WBC: CPT

## 2023-02-20 RX ORDER — ALLOPURINOL 300 MG/1
300 TABLET ORAL DAILY
Qty: 30 TABLET | Refills: 2 | Status: SHIPPED | OUTPATIENT
Start: 2023-02-20

## 2023-02-20 RX ORDER — SODIUM POLYSTYRENE SULFONATE 15 G/60ML
15 SUSPENSION ORAL; RECTAL ONCE
Qty: 60 ML | Refills: 0 | Status: SHIPPED | OUTPATIENT
Start: 2023-02-20 | End: 2023-02-22 | Stop reason: SDUPTHER
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
## 2023-02-20 NOTE — PATIENT INSTRUCTIONS
"Ponatinib        (my NA ti nib)    Generic Name: Ponatinib    Trade Name: ICLUSIG®    Ponatinib is the generic name for the trade drug ICLUSIG®. In some cases, health care professionals may use the trade name ICLUSIG® when referring to the generic drug name ponatinib.    Drug Type:    Ponatinib is a targeted therapy. Ponatinib is classified as a Tyrosine Kinase inhibitor; Vascular Endothelial Growth Factor (VEGF) inhibitor. (For more detail, see \"How this drug works,\" below.)    What Ponatinib Is Used For:  Ponatinib is used to treat chronic myelogenous leukemia (CML) and [place] chromosome positive acute lymphoblastic leukemia (Ph+ALL).  Note: If a drug has been approved for one use, physicians may elect to use this same drug for other problems if they believe it may be helpful.    How Ponatinib Is Given:  Ponatinib is a pill, taken by mouth.  Take panatinib with or without food.  You should not drink grapefruit juice or eat grapefruit during treatment with panatinib. It may change the amount of ponatinib in your blood.  Take ponatinib exactly as prescribed.  Swallow ponatinib tablets whole with at least 8 ounces of water. Do not crush or dissolve tablets.  Do not change your dose or stop ponatinib unless your health care provider tells you to.  If you miss a dose and your next dose is due in:    Less than 12 hours, take your next dose at the normal time. Do not make up the next dose.  12 hours or more, take the missed dose as soon as you remember. Take your next dose at the normal time.  Do not take more than 1 dose of ponatinib at one time. Call your health care provider right away if you take too much.  The amount of ponatinib that you will receive depends on many factors, including your general health or other health problems, and the type of cancer or condition you have. Your doctor will determine your exact ponatinib dosage and schedule.  Side Effects:  Important things to remember about the side " effects of ponatinib:    Most people will not experience all of the ponatinib side effects listed.  Ponatinib side effects are often predictable in terms of their onset, duration, and severity.  Ponatinib side effects are almost always reversible and will go away after therapy is complete.  Ponatinib side effects may be quite manageable. There are many options to minimize or prevent the side effects of ponatinib.  The following side effects are common (occurring in greater than 30%) for patients taking ponatinib:    High blood pressure  Neutropenia  Leukopenia  Blood sugar increased  Low platelets  Phosphorous decreased  Anemia  Rash  Increased liver enzymes ALT/AST  Calcium decreased  Abdominal pain  Constipation  Fatigue or weakness  Headache  Dry skin  Fever  Nausea  Appetite decreased  Joint pain  These are less common side effects (occurring in 10-29%) for patients receiving Ponatinib:    Sodium decreased  Albumin decreased  Diarrhea  Neutropenic fever  Blood sugar decreased  Increased bleeding  Vomiting  Mouth sores  Muscle aches  Swelling  Infection  Shortness of breath  Bilirubin increased  Pleural effusion  Cough  Potassium decreased  Pain  Potassium increased  Heart failure  Chills  Weight loss  Peripheral neuropathy  Muscle spasms  Pneumonia  Arterial ischemia  Bone pain  Insomnia  Upper respiratory infection  Urinary tract infection  Cellulitis (skin infection)  Bicarbonate decreased  Dizziness  Rare but serious possible side effects of Ponatinib:    This drug may raise your chance of having blood clots, a stroke or a heart attack. Talk with your doctor about this risk.  Not all side effects are listed above. Some that are rare (occurring in less than about 10 percent of patients) are not listed here. Always inform your health care provider if you experience any unusual symptoms.    When to contact your doctor or health care provider:  Seek emergency help immediately and notify your health care provider,  if you experience the following symptoms:    Chest pain, shortness of breath, weakness on one side of the body, speech problems, leg pain, or leg swelling  Contact your health care provider immediately, day or night, if you should experience any of the following symptoms:    Fever of 100.4° F (38° C or higher, chills)  Unusual bleeding  Black or tarry stools, or blood in your stools  The following symptoms require medical attention, but are not an emergency. Contact your doctor or health care provider within 24 hours of noticing any of the following:    Hypertension (systolic BP > 150 (top number) or diastolic BP > 90 (bottom number)  Diarrhea (4-6 episodes in a 24-hour period).  Nausea (interferes with ability to eat and unrelieved with prescribed medication).  Vomiting (vomiting more than 4-5 times in a 24 hour period).  Unable to eat or drink for 24 hours or have signs of dehydration: tiredness, thirst, dry mouth, dark and decrease amount of urine, or dizziness.  Skin or the whites of your eyes turn yellow  Urine turns dark or brown (tea color)  Decreased appetite  Pain on the right side of your stomach  Bleed or bruise more easily than normal  Itching  Cough with or without mucus  Mouth sores  Pain or burning with urination  Extreme fatigue (unable to carry on self-care activities)  Always inform your doctor or health care provider if you experience any unusual symptoms.    Precautions:  Before starting ponatinib treatment, make sure you tell your doctor about any other medications you are taking (including prescription, over-the-counter, vitamins, herbal remedies, etc.). While taking ponatinib, ask your doctor before you take aspirin or products containing aspirin.  While taking ponatinib, do not receive any kind of immunization or vaccination without your doctor’s approval.  Inform your health care professional if you are pregnant or may be pregnant prior to starting this treatment. Pregnancy category D (may  be hazardous to the fetus. Women who are pregnant or become pregnant must be advised of the potential hazard to the fetus.)  For both men and women: Use contraceptives and do not conceive a child (get pregnant) while taking ponatinib. Barrier methods of contraception such as condoms are recommended.  Do not breast feed while taking ponatinib  Self-Care Tips:  While taking ponatinib, drink at least two to three quarts of fluid every 24 hours, unless you are instructed otherwise.  Wash your hands often and after taking each dose of ponatinib.  You may be at risk of infection so try to avoid crowds or people with colds, and report fever or any other signs of infection immediately to your health care provider.  Monitor your blood pressure and notify your physician if blood pressure is elevated or if you develop severe headache, light headedness or other neurological symptoms (numbness, tingling, difficulty speaking).  To help treat/prevent mouth sores while taking ponatinib, use a soft toothbrush, and rinse three times a day with 1 teaspoon of baking soda mixed with 8 ounces of water.  Use an electric razor and a soft toothbrush to minimize bleeding.  Avoid contact sports or activities that could cause injury.  To reduce nausea, take anti-nausea medications as prescribed by your doctor, and eat small, frequent meals while taking ponatinib.  Follow regimen of anti-diarrhea medication as prescribed by your health care professional.  Eat foods that may help reduce diarrhea (see managing side effects - diarrhea ).  Avoid sun exposure. Wear SPF 15 (or higher) sunblock and protective clothing. Ponatinib may make you more sensitive to the sun and you may sunburn more easily.  In general, drinking alcoholic beverages should be kept to a minimum or avoided completely while you are taking ponatinib. You should discuss this with your doctor.  Get plenty of rest.  Maintain good nutrition while being treated with ponatinib.  If you  "experience symptoms or side effects while being treated with ponatinib, be sure to discuss them with your health care team. They can prescribe medications and/or offer other suggestions that are effective in managing such problems.  Monitoring and Testing:  You will be checked regularly by your doctor while you are taking ponatinib to monitor side effects and check your response to therapy. Periodic blood work will be obtained to monitor your complete blood count (CBC) as well as the function of other organs (such as your kidneys and liver). Your blood pressure will be monitored closely.    How Ponatinib Works:  Ponatinib is not a chemotherapy drug but one of many \"targeted therapies.\" Targeted therapy is the result of about 100 years of research dedicated to understanding the differences between cancer cells and normal cells. To date, cancer treatment has focused primarily on killing rapidly dividing cells because one feature of cancer cells is that divide rapidly. Unfortunately, some of our normal cells divide rapidly too, causing multiple side effects.    Targeted therapy is about identifying other features of cancer cells. Scientists look for specific differences in the cancer cells and the normal cells. This information is used to create a targeted therapy to attack the cancer cells without damaging the normal cells, thus leading to fewer side effects. Each type of targeted therapy works a little bit differently but all interfere with the ability of the cancer cell to grow, divide, repair and/or communicate with other cells.    There are different types of targeted therapies, defined in three broad categories. Some targeted therapies focus on the internal components and function of the cancer cell. The targeted therapies use small molecules that can get into the cell and disrupt the function of the cells, causing them to die. There are several types of targeted therapy that focus on the inner parts of the " cells. Other targeted therapies target receptors that are on the outside of the cell. Therapies that target receptors are also known as monoclonal antibodies. Antiangiogenesis inhibitors target the blood vessels that supply oxygen to the cells, ultimately causing the cells to starve.    Ponatinib is designed to block tumor cell growth in several ways. Ponatinib targets several proteins (called tyrosine kinases) on the surface of cancer cells, as well as targets within the cell. Several of these targets are thought to be involved in angiogenesis (making of blood vessels). By blocking these targets, it is hoped the cancer will shrink.    Research continues to identify which cancers may be best treated with targeted therapies and to identify additional targets for more types of cancer.    Note: We strongly encourage you to talk with your health care professional about your specific medical condition and treatments. The information contained in this web site is meant to be helpful and educational, but is not a substitute for medical advice.

## 2023-02-21 ENCOUNTER — TELEPHONE (OUTPATIENT)
Dept: ONCOLOGY | Facility: CLINIC | Age: 48
End: 2023-02-21
Payer: MEDICARE

## 2023-02-21 DIAGNOSIS — E87.5 HYPERKALEMIA: ICD-10-CM

## 2023-02-21 NOTE — TELEPHONE ENCOUNTER
Received a message that the pt's mother, Sayra, called stating that the pharmacy does not have Kayexalate available. I called multiple pharmacies to see if any of them had it available. The only pharmacy that had it available was [facility] on [facility]. I called Sayra to see if she would be agreeable to us sending the prescription there. She stated that she lives an hour away and would not be able to go to [facility]. I called [facility] to see if they would be able to overnight the medication. I was told that it could be mailed, but it would take 3-4 days for it to be delivered. I relayed this to Sayra. She stated that she would see if her other daughter would be able to  the medication. I advised her to let me know if she is able to find someone to pick it up and I will send the prescription. I also advised her that the pt will have to have her potassium level redrawn the day after she takes the Kayexalate. She asked if this could be done at her PCP office. I told her that I would set up the redraw there once she picks up the medication. Sayra verbalized understanding.

## 2023-02-21 NOTE — PROGRESS NOTES
Specialty Pharmacy Note: Gleevec 600 mg + hydroxyurea        2/20/2023   WBC 3.40 - 10.80 10*3/mm3 9.11   Neutrophils Absolute 1.70 - 7.00 10*3/mm3 5.64   Hemoglobin 12.0 - 15.9 g/dL 9.3 (A)   Hematocrit 34.0 - 46.6 % 29.8 (A)   Platelets 140 - 450 10*3/mm3 530 (A)   Creatinine 0.57 - 1.00 mg/dL 0.68   BUN 6 - 20 mg/dL 16   Sodium 136 - 145 mmol/L 137   Potassium 3.5 - 5.2 mmol/L 5.7 (A)   Glucose 65 - 99 mg/dL 259 (A)   Calcium 8.6 - 10.5 mg/dL 9.3   Albumin 3.5 - 5.2 g/dL 4.0   Total Protein 6.0 - 8.5 g/dL 7.1   AST (SGOT) 1 - 32 U/L 23   ALT (SGPT) 1 - 33 U/L 29   Alkaline Phosphatase 39 - 117 U/L 632 (A)   Total Bilirubin 0.0 - 1.2 mg/dL 1.2   Bilirubin, Direct 0.0 - 0.3 mg/dL 0.4 (A)   Bilirubin, Indirect mg/dL 0.8     Pt prescribed SPS for hyperkalemia. Notified by Dr. Lerma about switching current treatment regimen to Iclusig (ponatinib). No contraindications noted for medication; however there is a blackbox warning of HF and cardiotoxicity - made Dr. Lerma aware and MD referred pt to cardiology given pt will require close monitoring if pt on treatment. Per provider, pt to continue current treatment until Iclusig available. Pharmacy will continue to follow.      Thank you,  Amy Aldridge, Pharm.D.

## 2023-02-22 RX ORDER — SODIUM POLYSTYRENE SULFONATE 15 G/60ML
15 SUSPENSION ORAL; RECTAL ONCE
Qty: 60 ML | Refills: 0 | Status: SHIPPED | OUTPATIENT
Start: 2023-02-22 | End: 2023-02-22

## 2023-02-22 NOTE — TELEPHONE ENCOUNTER
PT'S MOTHER CALLED STATED SHE HAS SOMEONE TO GO AND GET IT NOW. PT'S SISTER IS GOING TO [place] TO GET THE MEDICATION FOR PATIENT.       [phone] PTS MOTHER

## 2023-02-22 NOTE — TELEPHONE ENCOUNTER
Called pt's mother to let her know that a prescription for Kayexalate has been sent to [facility] and that I will send an order for a repeat CMP to the pt's PCP. She verbalized understanding.

## 2023-03-01 NOTE — PROGRESS NOTES
Hematology/Oncology Outpatient Follow Up    PATIENT NAME:Nieves Mc  :1975  MRN: [id number]  PRIMARY CARE PHYSICIAN: Alida Latham APRN  REFERRING PHYSICIAN: No ref. provider found    Chief Complaint   Patient presents with   • Follow-up     CML (chronic myelocytic leukemia) (HCC)        HISTORY OF PRESENT ILLNESS:      Patient is a 47 y.o. female with PMH significant for CML on treatment with Imatinib.  Patient stated that she typically feels poorly with Imatinib with frequent nausea and vomiting.  Also complained of weakness and fatigue.  Patient presented to ED on 10/22/18 with complaints of an elevated blood sugar around 400.  Stated she felt poorly and has had a productive cough with yellow sputum.  Complained of nausea and stated she was unable to keep any food down anytime she ate.  The patient reported subjective fever without chills.  She stated she had been coughing so hard that she was vomiting.  She denied hematemesis.  Denied diarrhea, constipation or blood in her stool.       Patient’s chest x-ray showed no evidence of acute pulmonary embolus.  The patient has ground-glass opacities throughout the lungs.  There is some air trapping noted which would appear to be   infectious.  Patient was started on antibiotics.      Hem/Onc was consulted on 10/23/18 for co-management of patient with CML.  Patient was seen by Dr. Lerma on 10/12 on previous admission for patient reported CML on Imatinib (Gleevec).  Patient reports she is under the care of Dr. Roach at [facility] in [place].  Patient was seen by Dr. Lerma in May 2018 when patient presented with hematuria, which was attributed to her Imatinib.  Dr. Lerma followed during hospitalization but then patient returned to Dr. Roach for her usual follow up.  She was again seen on 10/12.  Patient is stating she will switch to Dr. Lerma.    · Review of Dr. Roach’s note:  On 18 patient had BCR-abl which was 61.326.   Patient had been on Imatinib 400 mg daily.  She had presented in March 2018 with WBC of 24, hemoglobin 13.1, platelet count of 1,067,000.  Patient apparently had follow up BCR-abl in August 2018, results are not available for review.  Her bone marrow aspiration and biopsy was also performed at that time is currently not available for review.    · 11/6/18 - .  Uric acid 6.5.  BCR-abl by RT-PCR was measured at 3.36%.    · Due to thrombocytosis, patient was placed on Hydroxyurea 500 mg twice a day on 12/11/18.  Platelet count juana to 900,000.  Patient was noncompliant with CBCs that were recommended.    Patient was asked to return to the office after not being able to reach her.   · 12/27/18 - Bone marrow aspiration and biopsy was consistent with chronic myeloid leukemia.  BCR-abl positive, chronic phase.  ABL kinase mutational analysis is currently pending on the bone marrow.    · 1/3/19 - Repeat CBC, WBC 17, hemoglobin 11.9, platelet count 1,072,000.  Hydroxyurea was increased to 1 gm twice a day with follow up CBC.    · 1/6/19 - Follow up CBC showed progressive increase in platelet to 1.1 million.  Patient was offered admission to the hospital, which she declined.  Hydroxyurea was increased to 1 gm three   times a day as of 1/6/19.   · 1/7/19 - EKG shows sinus tachycardia.   milliseconds.   ·  ABL kinase on peripheral blood was ordered on 1/11/19.  Based on sequence analysis, there was no mutation detected.    · 2/12/19 - Patient was initiated on Tasigna 300 mg twice a day.  · 2/13/19 - Urinalysis was negative for hematuria.   · 2/22/19 -  milliseconds.  · 3/13/19 - EKG with QTC is 413 milliseconds.  Nonspecific changes noted.    · 4/18/19 - EKG showed QTC prolonged to 453 milliseconds.  This is changed from prior.  · 4/18/19 - Free T4 normal at 0.91.  Magnesium was 1.7.  BUN is 6.  Creatinine 0.7.  Bilirubin 2.2.  Phosphorous normal 3.7.  TSH 0.43, normal.  Free T3 was normal at 3.47.  Ionized  calcium   normal at 1.23.  BCR-abl was 0.522%.    · 5/7/19 - EKG showed QTC of 441 milliseconds.  QT was 366.     · Patient has been lost to follow-up since 2019 for CML.  Patient states that she lost her insurance.  She also does not have any primary care physician at this time.  She is diabetic on insulin.  · Patient was recently admitted to the hospital for pneumonia due to COVID-19 infection.  At that time patient made a decision to become compliant with treatment.  She is currently on to [employer] 300 mg p.o. twice a day.  She is also on hydroxyurea 1 g twice a day.  Overall she feels better, she has more energy.  · 3/1/2022 white count is 53.92, hemoglobin 11.7 and platelets are 472    · 6/1/2022: Patient has not been seen since March 2022.  Also verified that she has not been taking to Tasigna since February 2022.  She comes in today with cough for 1 and half months, shortness of breath, denies fevers.  · 6/30/2022 patient had 2D echocardiogram which showed an EF of 41 to 45%.  Left ventricular cavity is mildly dilated.  She was diagnosed with nonischemic cardiomyopathy    · 11/18/2022: Patient was transferred from [facility] to [facility] due to cardiac failure.  She was then seen by [facility] hematology department.  Patient underwent tumor aspiration and biopsy at that time did not show chronic myeloid leukemia in accelerated phase markedly hypercellular marrow with megakaryocytic and myeloid hyperplasia with atypia and increased basophils blasts are not increased less than 1% moderate reticulin fibrosis.  According to the patient hydroxyurea was discontinued she was prescribed Gleevec 600 mg daily but she has only been taking 400 mg as she cannot find other milligram tablets.  She is already been pump inserted into her pelvic area.  She continues to experience nausea which resulted in her not taking the baclofen she should.  · 1/12/2023: WBC 17.64, hemoglobin 10.2, MCV 88.8,  platelets 458,000.  On Gleevec 600 mg daily and hydroxyurea 500 mg twice daily.  · 2023: WBC 10.67, hemoglobin 10.0, MCV 86.4, platelets 370,000.  Patient on Gleevec 600 mg daily and hydroxyurea 500 mg once a day.  Patient instructed at the visit to discontinue her hydroxyurea.  · 2023: WBC 17.75, hemoglobin 10.4, MCV 87.2, platelets 425,000.  On Gleevec 600 mg daily.    Past Medical History:   Diagnosis Date   • Bone pain    • COVID-19 virus infection 2022   • Diabetes mellitus (HCC)    • Extremity pain     Carlin. legs pain   • Leg pain     left leg greater   • Migraine    • Pulmonary embolism (HCC)    • Vision loss     doing surgery       Past Surgical History:   Procedure Laterality Date   • BONE MARROW BIOPSY     • BREAST SURGERY     • BRONCHOSCOPY N/A 2022    Procedure: BRONCHOSCOPY bilateral lung washing;  Surgeon: Charlene Camara MD;  Location: [facility];  Service: Pulmonary;  Laterality: N/A;  post: rule out infection vs transfusion lung injury   •  SECTION     • CHOLECYSTECTOMY     • EYE SURGERY      laser surgery due  to hemmorage--- 2021-- another surgery  lt eye11/15/21   • RETINAL DETACHMENT SURGERY     • SPINE SURGERY      Lombardi spinal block   • TUBAL ABDOMINAL LIGATION           Current Outpatient Medications:   •  acetaminophen (TYLENOL) 325 MG tablet, 2 tablets., Disp: , Rfl:   •  allopurinol (Zyloprim) 300 MG tablet, Take 1 tablet by mouth Daily., Disp: 30 tablet, Rfl: 2  •  ALPRAZolam (Xanax) 0.5 MG tablet, Take 1 tablet by mouth At Night As Needed for Anxiety., Disp: 30 tablet, Rfl: 0  •  budesonide-formoterol (SYMBICORT) 160-4.5 MCG/ACT inhaler, Inhale 2 puffs 2 (Two) Times a Day., Disp: 10.2 g, Rfl: 1  •  citalopram (CeleXA) 10 MG tablet, Take 1 tablet by mouth Daily. To begin 22, Disp: 30 tablet, Rfl: 2  •  dapagliflozin Propanediol (Farxiga) 10 MG tablet, 10 mg., Disp: , Rfl:   •  folic acid (FOLVITE) 1 MG tablet, Take 1 tablet by mouth Daily., Disp:  30 tablet, Rfl: 0  •  furosemide (LASIX) 20 MG tablet, Take 1 tablet by mouth Daily., Disp: , Rfl:   •  gabapentin (NEURONTIN) 300 MG capsule, 2 capsules., Disp: , Rfl:   •  hydroxyurea (HYDREA) 500 MG capsule, Take 2 capsules by mouth 2 (Two) Times a Day., Disp: 120 capsule, Rfl: 2  •  hydrOXYzine (ATARAX) 25 MG tablet, Take 1 tablet by mouth Every 8 (Eight) Hours As Needed., Disp: , Rfl:   •  imatinib (GLEEVEC) 100 MG chemo tablet, Take 2 tablets by mouth with 1 other imatinib prescription for 600 mg total Daily. Take with food and large glass of water; Do not take with Grapefruit juice., Disp: 60 tablet, Rfl: 2  •  imatinib (GLEEVEC) 400 MG chemo tablet, Take 1 tablet by mouth with 1 other imatinib prescription for 600 mg total Daily. Take with food and large glass of water; Do not take with Grapefruit juice., Disp: 30 tablet, Rfl: 2  •  Insulin Glargine (BASAGLAR KWIKPEN) 100 UNIT/ML injection pen, Inject 20 Units under the skin into the appropriate area as directed Daily., Disp: 10 mL, Rfl: 3  •  Insulin Glargine, 1 Unit Dial, (TOUJEO) 300 UNIT/ML solution pen-injector injection,  20 Units 0.2 mL, SubCutaneous, Inj, Daily, 0 Refill(s), Disp: , Rfl:   •  Insulin Lispro (ADMELOG SOLOSTAR) 100 UNIT/ML injection pen, Inject 6 Units under the skin into the appropriate area as directed 3 (Three) Times a Day., Disp: 3 mL, Rfl: 3  •  losartan (COZAAR) 25 MG tablet, Take 1 tablet by mouth Daily., Disp: 30 tablet, Rfl: 0  •  metoprolol succinate XL (TOPROL-XL) 25 MG 24 hr tablet, 1 tablet., Disp: , Rfl:   •  nitrofurantoin, macrocrystal-monohydrate, (Macrobid) 100 MG capsule, Take 1 capsule by mouth 2 (Two) Times a Day., Disp: 10 capsule, Rfl: 0  •  omeprazole (priLOSEC) 40 MG capsule, Take 1 capsule by mouth Daily., Disp: 90 capsule, Rfl: 0  •  ondansetron ODT (ZOFRAN-ODT) 8 MG disintegrating tablet, Place 1 tablet on the tongue Every 8 (Eight) Hours As Needed for Nausea or Vomiting., Disp: 20 tablet, Rfl: 2  •   oxymetazoline (AFRIN) 0.05 % nasal spray,  2 Spray, Nostrils Both, Spray, BID, 0 Refill(s), Disp: , Rfl:   •  promethazine (PHENERGAN) 12.5 MG tablet, TAKE 1 TABLET BY MOUTH EVERY 4 HOURS as needed FOR nausea and/or vomiting, Disp: , Rfl:   •  sacubitril-valsartan (Entresto) 24-26 MG tablet,  1 Tab, Oral, Tab, BID, # 60 Tab, 0 Refill(s), Disp: , Rfl:   •  carvedilol (COREG) 6.25 MG tablet, Take 1 tablet by mouth 2 (Two) Times a Day With Meals for 30 days., Disp: 60 tablet, Rfl: 0    Allergies   Allergen Reactions   • Hydromorphone GI Intolerance     dilaudid   • Morphine Nausea And Vomiting   • Propofol Hives     Previously tolerated being premedicated with diphenhydramine and famotadine       Family History   Problem Relation Age of Onset   • Diabetes Mother    • Diabetes Maternal Grandmother    • Heart attack Maternal Grandmother    • Stroke Maternal Grandmother        Cancer-related family history is not on file.    Social History     Tobacco Use   • Smoking status: Never   • Smokeless tobacco: Never   Vaping Use   • Vaping Use: Never used   Substance Use Topics   • Alcohol use: No   • Drug use: No     I have reviewed and confirmed the accuracy of the patient's history: Chief complaint, HPI, ROS and Subjective as entered by the MA/LPN/RN. Denisha Gill, APRN 03/02/23     SUBJECTIVE:  Patient is here for routine follow-up.  She reports she is having significant anxiety and depression.  She is worried about her heart function and the new medication.  She does have her appointment scheduled for 3/10/2023 to have her EKG and echocardiogram done prior to starting the ponatinib.  She says that has been stressful getting the paperwork together for the ponatinib but that it has now been completed and is waiting approval.  She states that while she does have trouble with anxiety throughout the day the majority of her issues are at night she is sleeping very poorly.  She usually gets a stretch of about 2 hours and  "wakes frequently.  She is on Ativan 0.5 mg p.o. once nightly and she states that it does help her to fall asleep but she does not stay asleep.  She is no longer on Celexa and has not been for some time and states that it was not helpful when she took it.        REVIEW OF SYSTEMS:    Review of Systems   Constitutional: Negative for chills and fever.   HENT: Negative for ear pain, mouth sores, nosebleeds and sore throat.    Eyes: Negative for photophobia and visual disturbance.   Respiratory: Negative for wheezing and stridor.    Cardiovascular: Negative for chest pain and palpitations.   Gastrointestinal: Negative for abdominal pain, diarrhea, nausea and vomiting.   Endocrine: Negative for cold intolerance and heat intolerance.   Genitourinary: Negative for dysuria and hematuria.   Musculoskeletal: Negative for joint swelling and neck stiffness.   Skin: Negative for color change and rash.   Neurological: Negative for seizures and syncope.   Hematological: Negative for adenopathy.        No obvious bleeding   Psychiatric/Behavioral: Negative for agitation, confusion and hallucinations. Positive for insomnia, anxiety, depression.      OBJECTIVE:    Vitals:    03/02/23 1436   BP: 137/84   Pulse: 119   Temp: 98.6 °F (37 °C)   TempSrc: Oral   SpO2: 98%   Weight: 70.9 kg (156 lb 6.4 oz)   Height: 162.6 cm (64\")   PainSc:   8   PainLoc: Back  Comment: back and legs     Body mass index is 26.85 kg/m².    ECOG  (1) Restricted in physically strenuous activity, ambulatory and able to do work of light nature    Physical Exam  Vitals and nursing note reviewed.   Constitutional:       General: She is not in acute distress.     Appearance: She is not diaphoretic.   HENT:      Head: Normocephalic and atraumatic.   Eyes:      General: No scleral icterus.        Right eye: No discharge.         Left eye: No discharge.      Conjunctiva/sclera: Conjunctivae normal.   Neck:      Thyroid: No thyromegaly.   Cardiovascular:      Rate and " Rhythm: Normal rate and regular rhythm.      Heart sounds: Normal heart sounds. No friction rub. No gallop.    Pulmonary:      Effort: Pulmonary effort is normal. No respiratory distress.      Breath sounds: No stridor. No wheezing.   Abdominal:      General: Bowel sounds are normal.      Palpations: Abdomen is soft. There is no mass.      Tenderness: There is no abdominal tenderness. There is no guarding or rebound.   Musculoskeletal:         General: No tenderness. Normal range of motion.      Cervical back: Normal range of motion and neck supple.   Lymphadenopathy:      Cervical: No cervical adenopathy.   Skin:     General: Skin is warm.      Findings: No erythema or rash.   Neurological:      Mental Status: She is alert and oriented to person, place, and time.      Motor: No abnormal muscle tone.   Psychiatric:         Behavior: Behavior anxious and tearful    I have reexamined the patient and the results are consistent with the previously documented exam. Denisha Gill, JP         RECENT LABS    WBC   Date Value Ref Range Status   03/02/2023 12.65 (H) 3.40 - 10.80 10*3/mm3 Final     RBC   Date Value Ref Range Status   03/02/2023 3.32 (L) 3.77 - 5.28 10*6/mm3 Final     Hemoglobin   Date Value Ref Range Status   03/02/2023 8.9 (L) 12.0 - 15.9 g/dL Final     Hematocrit   Date Value Ref Range Status   03/02/2023 29.4 (L) 34.0 - 46.6 % Final     MCV   Date Value Ref Range Status   03/02/2023 88.6 79.0 - 97.0 fL Final     MCH   Date Value Ref Range Status   03/02/2023 26.8 26.6 - 33.0 pg Final     MCHC   Date Value Ref Range Status   03/02/2023 30.3 (L) 31.5 - 35.7 g/dL Final     RDW   Date Value Ref Range Status   03/02/2023 22.1 (H) 12.3 - 15.4 % Final     RDW-SD   Date Value Ref Range Status   03/02/2023 67.1 (H) 37.0 - 54.0 fl Final     MPV   Date Value Ref Range Status   03/02/2023 9.2 6.0 - 12.0 fL Final     Platelets   Date Value Ref Range Status   03/02/2023 676 (H) 140 - 450 10*3/mm3 Final      Neutrophil %   Date Value Ref Range Status   03/02/2023 69.0 42.7 - 76.0 % Final     Lymphocyte %   Date Value Ref Range Status   03/02/2023 16.2 (L) 19.6 - 45.3 % Final     Monocyte %   Date Value Ref Range Status   03/02/2023 9.4 5.0 - 12.0 % Final     Eosinophil %   Date Value Ref Range Status   03/02/2023 1.3 0.3 - 6.2 % Final     Basophil %   Date Value Ref Range Status   03/02/2023 4.1 (H) 0.0 - 1.5 % Final     External Neutrophils Abs   Date Value Ref Range Status   11/30/2022 7.6 (H) 1.7 - 6.0 x10(3)/ul Final     Neutrophils, Absolute   Date Value Ref Range Status   03/02/2023 8.73 (H) 1.70 - 7.00 10*3/mm3 Final     Lymphocytes, Absolute   Date Value Ref Range Status   03/02/2023 2.05 0.70 - 3.10 10*3/mm3 Final     Monocytes, Absolute   Date Value Ref Range Status   03/02/2023 1.19 (H) 0.10 - 0.90 10*3/mm3 Final     Eosinophils, Absolute   Date Value Ref Range Status   03/02/2023 0.16 0.00 - 0.40 10*3/mm3 Final     Basophils, Absolute   Date Value Ref Range Status   03/02/2023 0.52 (H) 0.00 - 0.20 10*3/mm3 Final     nRBC   Date Value Ref Range Status   11/11/2022 2.0 (H) 0.0 - 0.2 /100 WBC Final   10/16/2019 0.2 0.0 - 0.2 /100 WBC Final       Lab Results   Component Value Date    GLUCOSE 259 (H) 02/20/2023    BUN 16 02/20/2023    CREATININE 0.68 02/20/2023    EGFRIFNONA 133 02/02/2022    BCR 23.5 02/20/2023    K 5.7 (H) 02/20/2023    CO2 23.0 02/20/2023    CALCIUM 9.3 02/20/2023    ALBUMIN 4.0 02/20/2023    LABIL2 1.0 04/18/2019    AST 23 02/20/2023    ALT 29 02/20/2023           ASSESSMENT    · Accelerated phase CML, status post bone marrow biopsy on 11/3/2022.  Currently on Gleevec 600 mg daily.  This has not controlled her malignancy well for that reason we will switch patient to Ponatinib at 5 mg p.o. daily.  Reviewed the benefits, the side effects in detail  · She will continue Gleevec and hydroxyurea until ponatinib is available to help  · BCR ABL kinase mutation assay was negative  · We will  request for her additional records from [facility]  · Pain management, status post pain pump insertion.  She will follow-up with pain management at [place] or [facility].  Her pain management seems to be adequate  · Recent cardiomyopathy diagnosis with EF around 26 to 30% nonischemic.  Follows up with Dr. Ruiz with cardiology services.  Referral to cardiology services has been made  · CML in chronic phase with no significant hematologic or molecular or cytogenetic response on   Imatinib.  ABL kinase mutational analysis was negative.  We  have represcribed to Tasigna 300 mg twice a day.  Patient has been noncompliant with treatments and ended up in the hospital with hyperleukocytosis, peripheral blood blasts.  Bone marrow biopsy suggest that she remains in chronic phase.  Continue to Tasiigna at the current doses.  Hydroxyurea has been discontinued.  · Uncontrolled diabetes type 1, followed at the [facility] by Dr. Calles  · Chronic anemia: Stable, multifactorial from CML, to Tasigna, hydroxyurea.  This has improved  · Insomnia  · Situational anxiety, not suicidal.  Continue Xanax 0.5 mg once daily.  · Hyperleukocytosis secondary to CML  · History of medical noncompliance  · Pulmonary embolism: Xarelto restarted  · Recent COVID-19 pneumonia  · History of prolonged QTC        Plans    · Reviewed CBC.  CMP and uric acid today to reevaluate hyperkalemia and hyperuricemia  · Continue Gleevec 600 mg p.o. daily and hydroxyurea until ponatinib is available  · Continue Hydrea 1000 mg p.o. twice daily  · Ponatinib 45 mg p.o. daily.  She was given information on this drug to review  · BCR ABL kinase mutational analysis was negative  · Request additional records from [facility]  · Informed patient that she will be seen on a weekly basis for now  · Add trazodone 25 mg p.o. nightly for insomnia related to anxiety and depression  · Weaned off Elavil since  anxiety not improved  ·  Celexa 10 mg p.o. every morning prescribed by PCP.  Patient has discontinued.  · Reviewed her EKG completed on 3/28/2022  · Monitor CBC closely.  Repeat in 1 week.  • Continue antiemetics  • GI consult for continued nausea and vomiting and reported dark emesis.  She had EGD and colonoscopy coming up but they are requesting $400 upfront which she does not have.  I have asked  to investigate if there is an affordable option.  • Continue promethazine and ondansetron and add PPI  • Due to worsening anxiety increase Xanax to 0.5 mg p.o. nightly as needed number 30 pills.  Continue this for now.  Asked patient to take Xanax in the late afternoon due to the addition of trazodone at night.  • All questions answered  • Continue to follow-up with PCP per routine  • Follow-up with pain management per routine  • Follow-up with Dr. Lerma in 2 weeks as previously scheduled  • 2D echo and EKG due now-these are scheduled for 3/10/2023     Discussion: Educated patient on sleep hygiene to include no caffeine after noon, comfortable room temperature, use of a sound machine or fan, use of blackout curtains or sleep mask, no screens at bedtime, if she awakens during the night and is unable to fall asleep within 15 to 20 minutes to leave the bedroom and go to a different room and read, or do another relaxing activity until she becomes sleepy again.  Added trazodone 25 mg p.o. nightly and discussed that if needed she can increase that to 50 mg p.o. nightly to be taken 1 hour prior to sleep.  Asked her to take her Xanax in the late afternoon to avoid oversedation.  If needed she can take half of the Xanax in the late afternoon and half of the Xanax in the evening.  Her anxiety seems to worsen in the evenings and at night.  Advised her of common side effects of trazodone and asked that she call with any new or worsening symptoms.  She has a follow-up appointment scheduled in 2 weeks.    I spent 40  total minutes, face-to-face, caring for Hope today.  90% of this time involved counseling and/or coordination of care as documented within this note.

## 2023-03-02 ENCOUNTER — LAB (OUTPATIENT)
Dept: LAB | Facility: HOSPITAL | Age: 48
End: 2023-03-02
Payer: MEDICARE

## 2023-03-02 ENCOUNTER — OFFICE VISIT (OUTPATIENT)
Dept: ONCOLOGY | Facility: CLINIC | Age: 48
End: 2023-03-02
Payer: MEDICARE

## 2023-03-02 VITALS
SYSTOLIC BLOOD PRESSURE: 137 MMHG | OXYGEN SATURATION: 98 % | BODY MASS INDEX: 26.7 KG/M2 | DIASTOLIC BLOOD PRESSURE: 84 MMHG | HEART RATE: 119 BPM | TEMPERATURE: 98.6 F | WEIGHT: 156.4 LBS | HEIGHT: 64 IN

## 2023-03-02 DIAGNOSIS — C92.10 CML (CHRONIC MYELOCYTIC LEUKEMIA): ICD-10-CM

## 2023-03-02 DIAGNOSIS — E87.5 HYPERKALEMIA: Primary | ICD-10-CM

## 2023-03-02 DIAGNOSIS — F41.8 INSOMNIA SECONDARY TO DEPRESSION WITH ANXIETY: ICD-10-CM

## 2023-03-02 DIAGNOSIS — C92.10 CML (CHRONIC MYELOCYTIC LEUKEMIA): Primary | ICD-10-CM

## 2023-03-02 DIAGNOSIS — F51.05 INSOMNIA SECONDARY TO DEPRESSION WITH ANXIETY: ICD-10-CM

## 2023-03-02 DIAGNOSIS — E79.0 HYPERURICEMIA: ICD-10-CM

## 2023-03-02 DIAGNOSIS — E87.5 HYPERKALEMIA: ICD-10-CM

## 2023-03-02 LAB
ALBUMIN SERPL-MCNC: 3.9 G/DL (ref 3.5–5.2)
ALBUMIN/GLOB SERPL: 1.2 G/DL
ALP SERPL-CCNC: 621 U/L (ref 39–117)
ALT SERPL W P-5'-P-CCNC: 37 U/L (ref 1–33)
ANION GAP SERPL CALCULATED.3IONS-SCNC: 11 MMOL/L (ref 5–15)
AST SERPL-CCNC: 36 U/L (ref 1–32)
BASOPHILS # BLD AUTO: 0.52 10*3/MM3 (ref 0–0.2)
BASOPHILS NFR BLD AUTO: 4.1 % (ref 0–1.5)
BILIRUB SERPL-MCNC: 1 MG/DL (ref 0–1.2)
BUN SERPL-MCNC: 21 MG/DL (ref 6–20)
BUN/CREAT SERPL: 22.8 (ref 7–25)
CALCIUM SPEC-SCNC: 8.9 MG/DL (ref 8.6–10.5)
CHLORIDE SERPL-SCNC: 103 MMOL/L (ref 98–107)
CO2 SERPL-SCNC: 22 MMOL/L (ref 22–29)
CREAT SERPL-MCNC: 0.92 MG/DL (ref 0.57–1)
DEPRECATED RDW RBC AUTO: 67.1 FL (ref 37–54)
EGFRCR SERPLBLD CKD-EPI 2021: 77.4 ML/MIN/1.73
EOSINOPHIL # BLD AUTO: 0.16 10*3/MM3 (ref 0–0.4)
EOSINOPHIL NFR BLD AUTO: 1.3 % (ref 0.3–6.2)
ERYTHROCYTE [DISTWIDTH] IN BLOOD BY AUTOMATED COUNT: 22.1 % (ref 12.3–15.4)
GLOBULIN UR ELPH-MCNC: 3.3 GM/DL
GLUCOSE SERPL-MCNC: 332 MG/DL (ref 65–99)
HCT VFR BLD AUTO: 29.4 % (ref 34–46.6)
HGB BLD-MCNC: 8.9 G/DL (ref 12–15.9)
HOLD SPECIMEN: NORMAL
LYMPHOCYTES # BLD AUTO: 2.05 10*3/MM3 (ref 0.7–3.1)
LYMPHOCYTES NFR BLD AUTO: 16.2 % (ref 19.6–45.3)
MCH RBC QN AUTO: 26.8 PG (ref 26.6–33)
MCHC RBC AUTO-ENTMCNC: 30.3 G/DL (ref 31.5–35.7)
MCV RBC AUTO: 88.6 FL (ref 79–97)
MONOCYTES # BLD AUTO: 1.19 10*3/MM3 (ref 0.1–0.9)
MONOCYTES NFR BLD AUTO: 9.4 % (ref 5–12)
NEUTROPHILS NFR BLD AUTO: 69 % (ref 42.7–76)
NEUTROPHILS NFR BLD AUTO: 8.73 10*3/MM3 (ref 1.7–7)
PLATELET # BLD AUTO: 676 10*3/MM3 (ref 140–450)
PMV BLD AUTO: 9.2 FL (ref 6–12)
POTASSIUM SERPL-SCNC: 5.4 MMOL/L (ref 3.5–5.2)
PROT SERPL-MCNC: 7.2 G/DL (ref 6–8.5)
RBC # BLD AUTO: 3.32 10*6/MM3 (ref 3.77–5.28)
SODIUM SERPL-SCNC: 136 MMOL/L (ref 136–145)
URATE SERPL-MCNC: 11.5 MG/DL (ref 2.4–5.7)
WBC NRBC COR # BLD: 12.65 10*3/MM3 (ref 3.4–10.8)

## 2023-03-02 PROCEDURE — 99215 OFFICE O/P EST HI 40 MIN: CPT | Performed by: NURSE PRACTITIONER

## 2023-03-02 PROCEDURE — 84550 ASSAY OF BLOOD/URIC ACID: CPT | Performed by: NURSE PRACTITIONER

## 2023-03-02 PROCEDURE — 85025 COMPLETE CBC W/AUTO DIFF WBC: CPT | Performed by: NURSE PRACTITIONER

## 2023-03-02 PROCEDURE — 36415 COLL VENOUS BLD VENIPUNCTURE: CPT

## 2023-03-02 PROCEDURE — 80053 COMPREHEN METABOLIC PANEL: CPT | Performed by: NURSE PRACTITIONER

## 2023-03-02 RX ORDER — TRAZODONE HYDROCHLORIDE 50 MG/1
25 TABLET ORAL NIGHTLY
Qty: 30 TABLET | Refills: 2 | Status: SHIPPED | OUTPATIENT
Start: 2023-03-02 | End: 2023-03-23

## 2023-03-03 ENCOUNTER — DOCUMENTATION (OUTPATIENT)
Dept: PHARMACY | Facility: HOSPITAL | Age: 48
End: 2023-03-03
Payer: MEDICARE

## 2023-03-03 ENCOUNTER — SPECIALTY PHARMACY (OUTPATIENT)
Dept: PHARMACY | Facility: HOSPITAL | Age: 48
End: 2023-03-03
Payer: MEDICARE

## 2023-03-03 DIAGNOSIS — E79.0 HYPERURICEMIA: ICD-10-CM

## 2023-03-03 DIAGNOSIS — E87.5 HYPERKALEMIA: Primary | ICD-10-CM

## 2023-03-03 DIAGNOSIS — C92.10 CML (CHRONIC MYELOCYTIC LEUKEMIA): ICD-10-CM

## 2023-03-03 RX ORDER — SODIUM POLYSTYRENE SULFONATE 15 G/60ML
15 SUSPENSION ORAL; RECTAL ONCE
Qty: 60 ML | Refills: 0 | Status: SHIPPED | OUTPATIENT
Start: 2023-03-03 | End: 2023-03-03

## 2023-03-03 NOTE — PROGRESS NOTES
Specialty Pharmacy Note: Gleevec + hydroxyurea       Pharmacy reviewed labs and no dose adjustments are needed on patient's oral specialty medication given current lab results. Pharmacy is current working on getting access to patient's new medication, Iclusig (ponatinib) which will replace patient's current therapy.       Thank you,  Amy Aldridge Pharm.D.

## 2023-03-03 NOTE — PROGRESS NOTES
Specialty Pharmacy Note     Looked on the [id number] portal and Hope had schedule her imatinib and had it delivered on 3/1/23. Patient is to continue on medication until she starts her new med iclusig. Iclusig PAP is currently being worked on and in process.    Jyotsna Anton - CARE COORDINATOR  3/3/2023  09:45 EST

## 2023-03-03 NOTE — TELEPHONE ENCOUNTER
Messages from Denisha:    Please let her know to increase her allopurinol to 300 mg p.o. twice daily x72 hours and schedule her on Monday for a repeat BMP and uric acid level    please send in the Kayexolate again and also educate on a low potassium diet      SW Pt's mother, she understood Allopurinol adjustment, will  rx from [place] tomorrow and have labs done at her pcp Monday. Lab orders faxed 03/03 to PCP.

## 2023-03-06 DIAGNOSIS — R94.31 ABNORMAL EKG: ICD-10-CM

## 2023-03-06 DIAGNOSIS — C92.10 CML (CHRONIC MYELOCYTIC LEUKEMIA): Primary | ICD-10-CM

## 2023-03-06 DIAGNOSIS — I50.9 CONGESTIVE HEART FAILURE, UNSPECIFIED HF CHRONICITY, UNSPECIFIED HEART FAILURE TYPE: ICD-10-CM

## 2023-03-10 ENCOUNTER — DOCUMENTATION (OUTPATIENT)
Dept: PHARMACY | Facility: HOSPITAL | Age: 48
End: 2023-03-10
Payer: MEDICARE

## 2023-03-10 ENCOUNTER — SPECIALTY PHARMACY (OUTPATIENT)
Dept: PHARMACY | Facility: HOSPITAL | Age: 48
End: 2023-03-10
Payer: MEDICARE

## 2023-03-10 ENCOUNTER — TELEPHONE (OUTPATIENT)
Dept: CARDIOLOGY | Facility: CLINIC | Age: 48
End: 2023-03-10

## 2023-03-10 ENCOUNTER — LAB (OUTPATIENT)
Dept: LAB | Facility: HOSPITAL | Age: 48
End: 2023-03-10
Payer: MEDICARE

## 2023-03-10 ENCOUNTER — HOSPITAL ENCOUNTER (OUTPATIENT)
Dept: CARDIOLOGY | Facility: HOSPITAL | Age: 48
Discharge: HOME | End: 2023-03-10
Admitting: INTERNAL MEDICINE
Payer: MEDICARE

## 2023-03-10 ENCOUNTER — PATIENT ROUNDING (BHMG ONLY) (OUTPATIENT)
Dept: CARDIOLOGY | Facility: CLINIC | Age: 48
End: 2023-03-10

## 2023-03-10 ENCOUNTER — OFFICE VISIT (OUTPATIENT)
Dept: CARDIOLOGY | Facility: CLINIC | Age: 48
End: 2023-03-10
Payer: MEDICARE

## 2023-03-10 ENCOUNTER — TELEPHONE (OUTPATIENT)
Dept: ONCOLOGY | Facility: CLINIC | Age: 48
End: 2023-03-10
Payer: MEDICARE

## 2023-03-10 VITALS
WEIGHT: 155 LBS | SYSTOLIC BLOOD PRESSURE: 135 MMHG | OXYGEN SATURATION: 100 % | HEART RATE: 115 BPM | DIASTOLIC BLOOD PRESSURE: 78 MMHG | HEIGHT: 64 IN | BODY MASS INDEX: 26.46 KG/M2

## 2023-03-10 DIAGNOSIS — Z91.199 MEDICALLY NONCOMPLIANT: Chronic | ICD-10-CM

## 2023-03-10 DIAGNOSIS — I42.8 NICM (NONISCHEMIC CARDIOMYOPATHY): Primary | ICD-10-CM

## 2023-03-10 DIAGNOSIS — E87.5 HYPERKALEMIA: ICD-10-CM

## 2023-03-10 DIAGNOSIS — I50.9 CONGESTIVE HEART FAILURE, UNSPECIFIED HF CHRONICITY, UNSPECIFIED HEART FAILURE TYPE: ICD-10-CM

## 2023-03-10 DIAGNOSIS — C92.10 CML (CHRONIC MYELOCYTIC LEUKEMIA): Primary | ICD-10-CM

## 2023-03-10 DIAGNOSIS — E79.0 HYPERURICEMIA: ICD-10-CM

## 2023-03-10 DIAGNOSIS — C92.10 CML (CHRONIC MYELOCYTIC LEUKEMIA): ICD-10-CM

## 2023-03-10 DIAGNOSIS — R00.0 TACHYCARDIA: ICD-10-CM

## 2023-03-10 DIAGNOSIS — R94.31 ABNORMAL EKG: ICD-10-CM

## 2023-03-10 DIAGNOSIS — C92.10 CML (CHRONIC MYELOCYTIC LEUKEMIA): Chronic | ICD-10-CM

## 2023-03-10 LAB
BASOPHILS NFR BLD AUTO: ABNORMAL %
BH CV ECHO MEAS - ACS: 1.98 CM
BH CV ECHO MEAS - AO MAX PG: 6.9 MMHG
BH CV ECHO MEAS - AO MEAN PG: 3.9 MMHG
BH CV ECHO MEAS - AO ROOT DIAM: 2.8 CM
BH CV ECHO MEAS - AO V2 MAX: 131.4 CM/SEC
BH CV ECHO MEAS - AO V2 VTI: 25.2 CM
BH CV ECHO MEAS - AVA(I,D): 2.15 CM2
BH CV ECHO MEAS - EDV(CUBED): 132.1 ML
BH CV ECHO MEAS - EDV(MOD-SP4): 87.5 ML
BH CV ECHO MEAS - EF(MOD-BP): 44 %
BH CV ECHO MEAS - EF(MOD-SP4): 44.5 %
BH CV ECHO MEAS - ESV(CUBED): 80.7 ML
BH CV ECHO MEAS - ESV(MOD-SP4): 48.5 ML
BH CV ECHO MEAS - FS: 15.1 %
BH CV ECHO MEAS - IVS/LVPW: 0.78 CM
BH CV ECHO MEAS - IVSD: 0.87 CM
BH CV ECHO MEAS - LA DIMENSION: 4 CM
BH CV ECHO MEAS - LV DIASTOLIC VOL/BSA (35-75): 49.7 CM2
BH CV ECHO MEAS - LV MASS(C)D: 184.4 GRAMS
BH CV ECHO MEAS - LV MAX PG: 3 MMHG
BH CV ECHO MEAS - LV MEAN PG: 1.35 MMHG
BH CV ECHO MEAS - LV SYSTOLIC VOL/BSA (12-30): 27.6 CM2
BH CV ECHO MEAS - LV V1 MAX: 86.2 CM/SEC
BH CV ECHO MEAS - LV V1 VTI: 17.1 CM
BH CV ECHO MEAS - LVIDD: 5.1 CM
BH CV ECHO MEAS - LVIDS: 4.3 CM
BH CV ECHO MEAS - LVOT AREA: 3.2 CM2
BH CV ECHO MEAS - LVOT DIAM: 2.01 CM
BH CV ECHO MEAS - LVPWD: 1.11 CM
BH CV ECHO MEAS - MV E MAX VEL: 124.9 CM/SEC
BH CV ECHO MEAS - MV MAX PG: 8.1 MMHG
BH CV ECHO MEAS - MV MEAN PG: 3.2 MMHG
BH CV ECHO MEAS - MV V2 VTI: 22.5 CM
BH CV ECHO MEAS - MVA(VTI): 2.41 CM2
BH CV ECHO MEAS - PA ACC TIME: 0.08 SEC
BH CV ECHO MEAS - PA PR(ACCEL): 43.2 MMHG
BH CV ECHO MEAS - PULM DIAS VEL: 53.4 CM/SEC
BH CV ECHO MEAS - PULM S/D: 1.05
BH CV ECHO MEAS - PULM SYS VEL: 56.2 CM/SEC
BH CV ECHO MEAS - RAP SYSTOLE: 15 MMHG
BH CV ECHO MEAS - RV MAX PG: 1.48 MMHG
BH CV ECHO MEAS - RV V1 MAX: 60.8 CM/SEC
BH CV ECHO MEAS - RV V1 VTI: 10.9 CM
BH CV ECHO MEAS - RVDD: 3.5 CM
BH CV ECHO MEAS - RVSP: 48.1 MMHG
BH CV ECHO MEAS - SI(MOD-SP4): 22.1 ML/M2
BH CV ECHO MEAS - SV(LVOT): 54.3 ML
BH CV ECHO MEAS - SV(MOD-SP4): 38.9 ML
BH CV ECHO MEAS - TR MAX PG: 33.1 MMHG
BH CV ECHO MEAS - TR MAX VEL: 287.6 CM/SEC
DEPRECATED RDW RBC AUTO: 65 FL (ref 37–54)
EOSINOPHIL # BLD AUTO: 0.18 10*3/MM3 (ref 0–0.4)
EOSINOPHIL NFR BLD AUTO: 1.2 % (ref 0.3–6.2)
ERYTHROCYTE [DISTWIDTH] IN BLOOD BY AUTOMATED COUNT: 21.3 % (ref 12.3–15.4)
HCT VFR BLD AUTO: 29.7 % (ref 34–46.6)
HGB BLD-MCNC: 9.1 G/DL (ref 12–15.9)
LYMPHOCYTES # BLD AUTO: 2.28 10*3/MM3 (ref 0.7–3.1)
LYMPHOCYTES NFR BLD AUTO: 15.1 % (ref 19.6–45.3)
MAXIMAL PREDICTED HEART RATE: 173 BPM
MCH RBC QN AUTO: 26.9 PG (ref 26.6–33)
MCHC RBC AUTO-ENTMCNC: 30.6 G/DL (ref 31.5–35.7)
MCV RBC AUTO: 87.9 FL (ref 79–97)
MONOCYTES # BLD AUTO: 1 10*3/MM3 (ref 0.1–0.9)
MONOCYTES NFR BLD AUTO: 6.6 % (ref 5–12)
NEUTROPHILS NFR BLD AUTO: ABNORMAL %
PLATELET # BLD AUTO: 768 10*3/MM3 (ref 140–450)
PMV BLD AUTO: 9.2 FL (ref 6–12)
RBC # BLD AUTO: 3.38 10*6/MM3 (ref 3.77–5.28)
STRESS TARGET HR: 147 BPM
URATE SERPL-MCNC: 11.9 MG/DL (ref 2.4–5.7)
WBC NRBC COR # BLD: 15.1 10*3/MM3 (ref 3.4–10.8)

## 2023-03-10 PROCEDURE — 93000 ELECTROCARDIOGRAM COMPLETE: CPT | Performed by: INTERNAL MEDICINE

## 2023-03-10 PROCEDURE — 99215 OFFICE O/P EST HI 40 MIN: CPT | Performed by: INTERNAL MEDICINE

## 2023-03-10 PROCEDURE — 85025 COMPLETE CBC W/AUTO DIFF WBC: CPT

## 2023-03-10 PROCEDURE — 93306 TTE W/DOPPLER COMPLETE: CPT

## 2023-03-10 PROCEDURE — 84550 ASSAY OF BLOOD/URIC ACID: CPT | Performed by: NURSE PRACTITIONER

## 2023-03-10 PROCEDURE — 1160F RVW MEDS BY RX/DR IN RCRD: CPT | Performed by: INTERNAL MEDICINE

## 2023-03-10 PROCEDURE — 36415 COLL VENOUS BLD VENIPUNCTURE: CPT

## 2023-03-10 PROCEDURE — 93306 TTE W/DOPPLER COMPLETE: CPT | Performed by: INTERNAL MEDICINE

## 2023-03-10 PROCEDURE — 1159F MED LIST DOCD IN RCRD: CPT | Performed by: INTERNAL MEDICINE

## 2023-03-10 RX ORDER — METOPROLOL SUCCINATE 25 MG/1
25 TABLET, EXTENDED RELEASE ORAL DAILY
Qty: 90 TABLET | Refills: 3 | Status: SHIPPED | OUTPATIENT
Start: 2023-03-10

## 2023-03-10 RX ORDER — ALLOPURINOL 100 MG/1
400 TABLET ORAL 2 TIMES DAILY
Qty: 240 TABLET | Refills: 0 | Status: SHIPPED | OUTPATIENT
Start: 2023-03-10

## 2023-03-10 RX ORDER — MIRTAZAPINE 15 MG/1
15 TABLET, FILM COATED ORAL
COMMUNITY
Start: 2023-02-24 | End: 2023-03-24 | Stop reason: SDUPTHER

## 2023-03-10 RX ORDER — FUROSEMIDE 40 MG/1
40 TABLET ORAL 2 TIMES DAILY
Qty: 28 TABLET | Refills: 3 | Status: SHIPPED | OUTPATIENT
Start: 2023-03-10 | End: 2023-03-24

## 2023-03-10 RX ORDER — POTASSIUM CHLORIDE 1500 MG/1
20 TABLET, FILM COATED, EXTENDED RELEASE ORAL DAILY
Qty: 90 TABLET | Refills: 3 | Status: SHIPPED | OUTPATIENT
Start: 2023-03-10

## 2023-03-10 RX ORDER — DAPAGLIFLOZIN 10 MG/1
10 TABLET, FILM COATED ORAL DAILY
Qty: 90 TABLET | Refills: 3 | Status: SHIPPED | OUTPATIENT
Start: 2023-03-10

## 2023-03-10 RX ORDER — LOSARTAN POTASSIUM 25 MG/1
25 TABLET ORAL DAILY
Qty: 90 TABLET | Refills: 3 | Status: SHIPPED | OUTPATIENT
Start: 2023-03-10

## 2023-03-10 NOTE — TELEPHONE ENCOUNTER
FARXIGA IS GOING TO BE $100, AND PATIENT CAN NOT AFFORD THAT. IS THERE SOMETHING ELSE SHE CAN TRY?    [employer]act

## 2023-03-10 NOTE — PROGRESS NOTES
Specialty Pharmacy Note: Iclusig Delivery    Sayra called that Iclusig delivery expected for Tuesday 3/14 and wanted to know when she should stop Gleevec and Hydrea.  Per JP Zafar, she can stop Gleevec on Tuesday after dose and then start Iclusig on Wednesday.  She is to continue taking Hydrea at this time until seen by provider.  Also reviewed allopurinol dose increase to 400mg BID. No other questions.    Thanks,    Ros CALHOUN, PharmD

## 2023-03-10 NOTE — PROGRESS NOTES
Specialty Pharmacy Note     [employer] rep called and said that [facility] PAP was approved today and she eligible for free drug for year 2023. She had already schedule her medication and she is to receive it by mail on Tuesday 3/14/23. Called [facility] to verify and she was aware of delivery.    Jyotsna Anton - CARE COORDINATOR  3/10/2023  09:28 EST

## 2023-03-10 NOTE — TELEPHONE ENCOUNTER
----- Message from JP Varela sent at 3/10/2023 11:15 AM EST -----  Please call her and see if she is taking the Allopurinol 300mg po daily.  I need to confirm dose because her uric acid is going up.  Let me know what she says. Ty

## 2023-03-10 NOTE — PROGRESS NOTES
Encounter Date:03/10/2023      Patient ID: Nieves Mc is a 47 y.o. female.    Chief Complaint   Patient presents with   • Congestive Heart Failure          History of Present Illness    Nieves is a 47-year-old with history of nonischemic cardiomyopathy, CML, type 2 diabetes who presents to [facility].  Her last admission was back in November at that time she had chest pain.  CT PE protocol was negative and showed extensive anasarca.  She had a stress test done which showed no perfusion defects.  She was started on GDMT and eventually transferred to [facility] for advanced heart failure care.  She said she was admitted there for about 2 months and was discharged in December.  Since discharge she has not been taking any of her cardiac meds as she says she did not have any.    She continues to feel short of breath with significant abdominal and lower extremity swelling.  She also complains of significant dyspnea on exertion.  Denies any orthopnea or PND.  Does not sleep well.  Has chest pain occasionally and this has remained stable and has not increased in frequency or intensity.  She does get dizzy and lightheaded but denies any episodes of syncope.  She said she lost vision in her right eye 2 years ago and her vision in her left eye is also decreasing.  She did have an echocardiogram today which I have reviewed.  Her EF is improved to 44% but she does have distention of her IVC with no respiratory collapse indicating volume overload and she also has severe TR.  She also has moderate pulmonary hypertension.    Results for orders placed during the hospital encounter of 03/10/23    Adult Transthoracic Echo Complete W/ Cont if Necessary Per Protocol    Interpretation Summary  •  Left ventricular systolic function is mildly decreased. Calculated left ventricular EF = 44%  •  The left ventricular cavity is moderately dilated.  •  Left ventricular diastolic function was indeterminate.  •  Severe tricuspid valve  regurgitation is present.  •  Estimated right ventricular systolic pressure from tricuspid regurgitation is moderately elevated (45-55 mmHg).  •  The inferior vena cava is dilated. IVC inspiratory collapse is absent.      The following portions of the patient's history were reviewed and updated as appropriate: allergies, current medications, past family history, past medical history, past social history, past surgical history, and problem list.    Review of Systems   Constitutional: Positive for malaise/fatigue.   Cardiovascular: Positive for chest pain, dyspnea on exertion and leg swelling. Negative for palpitations.   Respiratory: Positive for shortness of breath. Negative for cough.    Gastrointestinal: Positive for nausea and vomiting. Negative for abdominal pain.   Neurological: Positive for dizziness, light-headedness and numbness. Negative for focal weakness and headaches.   All other systems reviewed and are negative.        Current Outpatient Medications:   •  acetaminophen (TYLENOL) 325 MG tablet, 2 tablets., Disp: , Rfl:   •  allopurinol (Zyloprim) 300 MG tablet, Take 1 tablet by mouth Daily., Disp: 30 tablet, Rfl: 2  •  ALPRAZolam (Xanax) 0.5 MG tablet, Take 1 tablet by mouth At Night As Needed for Anxiety., Disp: 30 tablet, Rfl: 0  •  budesonide-formoterol (SYMBICORT) 160-4.5 MCG/ACT inhaler, Inhale 2 puffs 2 (Two) Times a Day., Disp: 10.2 g, Rfl: 1  •  furosemide (LASIX) 20 MG tablet, Take 1 tablet by mouth Daily., Disp: , Rfl:   •  hydrOXYzine (ATARAX) 25 MG tablet, Take 1 tablet by mouth Every 8 (Eight) Hours As Needed., Disp: , Rfl:   •  imatinib (GLEEVEC) 100 MG chemo tablet, Take 2 tablets by mouth with 1 other imatinib prescription for 600 mg total Daily. Take with food and large glass of water; Do not take with Grapefruit juice., Disp: 60 tablet, Rfl: 2  •  imatinib (GLEEVEC) 400 MG chemo tablet, Take 1 tablet by mouth with 1 other imatinib prescription for 600 mg total Daily. Take with food and  large glass of water; Do not take with Grapefruit juice., Disp: 30 tablet, Rfl: 2  •  Insulin Glargine (BASAGLAR KWIKPEN) 100 UNIT/ML injection pen, Inject 20 Units under the skin into the appropriate area as directed Daily., Disp: 10 mL, Rfl: 3  •  Insulin Lispro (ADMELOG SOLOSTAR) 100 UNIT/ML injection pen, Inject 6 Units under the skin into the appropriate area as directed 3 (Three) Times a Day., Disp: 3 mL, Rfl: 3  •  oxymetazoline (AFRIN) 0.05 % nasal spray,  2 Spray, Nostrils Both, Spray, BID, 0 Refill(s), Disp: , Rfl:   •  promethazine (PHENERGAN) 12.5 MG tablet, TAKE 1 TABLET BY MOUTH EVERY 4 HOURS as needed FOR nausea and/or vomiting, Disp: , Rfl:   •  traZODone (DESYREL) 50 MG tablet, Take 0.5 tablets by mouth Every Night., Disp: 30 tablet, Rfl: 2  •  dapagliflozin Propanediol (Farxiga) 10 MG tablet, Take 10 mg by mouth Daily., Disp: 90 tablet, Rfl: 3  •  furosemide (LASIX) 40 MG tablet, Take 1 tablet by mouth 2 (Two) Times a Day for 14 days., Disp: 28 tablet, Rfl: 3  •  losartan (COZAAR) 25 MG tablet, Take 1 tablet by mouth Daily., Disp: 90 tablet, Rfl: 3  •  metoprolol succinate XL (TOPROL-XL) 25 MG 24 hr tablet, Take 1 tablet by mouth Daily., Disp: 90 tablet, Rfl: 3  •  mirtazapine (REMERON) 15 MG tablet, Take 1 tablet by mouth every night at bedtime., Disp: , Rfl:   •  potassium chloride (K-TAB) 20 MEQ tablet controlled-release ER tablet, Take 1 tablet by mouth Daily., Disp: 90 tablet, Rfl: 3    No Known Allergies    Family History   Problem Relation Age of Onset   • Diabetes Mother    • Diabetes Maternal Grandmother    • Heart attack Maternal Grandmother    • Stroke Maternal Grandmother        Past Surgical History:   Procedure Laterality Date   • BONE MARROW BIOPSY     • BREAST SURGERY     • BRONCHOSCOPY N/A 6/6/2022    Procedure: BRONCHOSCOPY bilateral lung washing;  Surgeon: Charlene Camara MD;  Location: [facility];  Service: Pulmonary;  Laterality: N/A;  post: rule out infection vs transfusion  "lung injury   •  SECTION     • CHOLECYSTECTOMY     • EYE SURGERY      laser surgery due  to hemmorage--- 2021-- another surgery  lt eye11/15/21   • RETINAL DETACHMENT SURGERY     • SPINE SURGERY      Lombardi spinal block   • TUBAL ABDOMINAL LIGATION         Past Medical History:   Diagnosis Date   • Bone pain    • COVID-19 virus infection 2022   • Diabetes mellitus (HCC)    • Extremity pain     Carlin. legs pain   • Leg pain     left leg greater   • Migraine    • Pulmonary embolism (HCC)    • Vision loss     doing surgery       Social History     Socioeconomic History   • Marital status:    Tobacco Use   • Smoking status: Never   • Smokeless tobacco: Never   Vaping Use   • Vaping Use: Never used   Substance and Sexual Activity   • Alcohol use: No   • Drug use: No   • Sexual activity: Defer           ECG 12 Lead    Date/Time: 3/10/2023 11:52 AM  Performed by: Jane Hopper MD  Authorized by: Jane Hopper MD   Comparison: compared with previous ECG   Similar to previous ECG  Rhythm: sinus tachycardia  BPM: 112    Clinical impression: non-specific ECG              Objective:       Physical Exam    /78 (BP Location: Right arm, Patient Position: Sitting)   Pulse 115   Ht 162.6 cm (64\")   Wt 70.3 kg (155 lb)   LMP 2022 (Approximate)   SpO2 100%   BMI 26.61 kg/m²   The patient is alert, oriented and in no distress.  Swelling around her eyes    Vital signs as noted above.    Head and neck revealed no carotid bruits +jugular venous distension.  No thyromegaly or lymphadenopathy is present.    Lungs clear.  No wheezing.  Breath sounds are normal bilaterally.    Heart normal first and second heart sounds.  Tachycardic.  No murmur..  No pericardial rub is present.  No gallop is present.    Abdomen soft and nontender.  No organomegaly is present.  Abdominal swelling    Extremities revealed good peripheral pulses bilateral pedal edema    Skin warm and dry.    Musculoskeletal system " is grossly normal.    CNS grossly normal.           Diagnosis Plan   1. NICM (nonischemic cardiomyopathy) ([facility])        2. Tachycardia        3. CML (chronic myelocytic leukemia) ([facility])        4. Medically noncompliant        LAB RESULTS (LAST 7 DAYS)    CBC  Results from last 7 days   Lab Units 03/10/23  0941   WBC 10*3/mm3 15.10*   RBC 10*6/mm3 3.38*   HEMOGLOBIN g/dL 9.1*   HEMATOCRIT % 29.7*   MCV fL 87.9   PLATELETS 10*3/mm3 768*       BMP        CMP         BNP        TROPONIN        CoAg        Creatinine Clearance  Estimated Creatinine Clearance: 72.7 mL/min (by C-G formula based on SCr of 0.92 mg/dL).    ABG        Radiology  No radiology results for the last day         Assessment and Plan       Diagnoses and all orders for this visit:    1. NICM (nonischemic cardiomyopathy) (HCC) (Primary)    2. Tachycardia    3. CML (chronic myelocytic leukemia) ([facility])    4. Medically noncompliant    Other orders  -     metoprolol succinate XL (TOPROL-XL) 25 MG 24 hr tablet; Take 1 tablet by mouth Daily.  Dispense: 90 tablet; Refill: 3  -     furosemide (LASIX) 40 MG tablet; Take 1 tablet by mouth 2 (Two) Times a Day for 14 days.  Dispense: 28 tablet; Refill: 3  -     potassium chloride (K-TAB) 20 MEQ tablet controlled-release ER tablet; Take 1 tablet by mouth Daily.  Dispense: 90 tablet; Refill: 3  -     losartan (COZAAR) 25 MG tablet; Take 1 tablet by mouth Daily.  Dispense: 90 tablet; Refill: 3  -     dapagliflozin Propanediol (Farxiga) 10 MG tablet; Take 10 mg by mouth Daily.  Dispense: 90 tablet; Refill: 3  -     ECG 12 Lead         Nonischemic cardiomyopathy  EF is improved to 44%  She remains noncompliant with her medications and has not been on them since her discharge from [facility]  We will restart metoprolol succinate 25 mg  Losartan 25 mg  Farxiga 10 mg  She also appears to be volume overloaded on exam and echocardiogram shows dilated IVC with no respiratory collapse  I have instructed her to take  Lasix 40 mg twice daily for 2 weeks along with potassium supplementation  She can then back down to her regular dose of Lasix    Tachycardia  Start metoprolol    Medical noncompliance  Instructed her on importance of being compliant with her cardiac meds to prevent drop in her LVEF again.    CML  Currently following with Dr. Lerma    Close follow-up in 2 months    45 minutes spent in patient care on this date of service including but not limited to preparing to see the patient, obtaining and/or reviewing history, performing medically appropriate examination, ordering tests and procedures, independently interpreting results and documenting information in EHR and counseling and education of patient and family.  It this excludes time spent on other separate services such as performing procedures or test interpretation if applicable      Jane Hopper MD

## 2023-03-10 NOTE — PROGRESS NOTES
A My-Chart message has been sent to the patient for PATIENT ROUNDING with [facility]

## 2023-03-14 ENCOUNTER — SPECIALTY PHARMACY (OUTPATIENT)
Dept: PHARMACY | Facility: HOSPITAL | Age: 48
End: 2023-03-14
Payer: MEDICARE

## 2023-03-15 ENCOUNTER — OFFICE VISIT (OUTPATIENT)
Dept: ONCOLOGY | Facility: CLINIC | Age: 48
End: 2023-03-15
Payer: MEDICARE

## 2023-03-15 ENCOUNTER — SPECIALTY PHARMACY (OUTPATIENT)
Dept: PHARMACY | Facility: HOSPITAL | Age: 48
End: 2023-03-15
Payer: MEDICARE

## 2023-03-15 ENCOUNTER — APPOINTMENT (OUTPATIENT)
Dept: PHARMACY | Facility: HOSPITAL | Age: 48
End: 2023-03-15
Payer: MEDICARE

## 2023-03-15 ENCOUNTER — LAB (OUTPATIENT)
Dept: LAB | Facility: HOSPITAL | Age: 48
End: 2023-03-15
Payer: MEDICARE

## 2023-03-15 VITALS
DIASTOLIC BLOOD PRESSURE: 82 MMHG | HEIGHT: 64 IN | TEMPERATURE: 97 F | HEART RATE: 112 BPM | BODY MASS INDEX: 26.8 KG/M2 | SYSTOLIC BLOOD PRESSURE: 131 MMHG | WEIGHT: 157 LBS | RESPIRATION RATE: 18 BRPM

## 2023-03-15 DIAGNOSIS — C92.10 CML (CHRONIC MYELOCYTIC LEUKEMIA): ICD-10-CM

## 2023-03-15 DIAGNOSIS — C92.10 CML (CHRONIC MYELOCYTIC LEUKEMIA): Primary | ICD-10-CM

## 2023-03-15 DIAGNOSIS — Z51.81 ENCOUNTER FOR MEDICATION MONITORING: ICD-10-CM

## 2023-03-15 DIAGNOSIS — F41.9 ANXIETY: ICD-10-CM

## 2023-03-15 DIAGNOSIS — E79.0 HYPERURICEMIA: ICD-10-CM

## 2023-03-15 DIAGNOSIS — E87.5 HYPERKALEMIA: ICD-10-CM

## 2023-03-15 DIAGNOSIS — C95.90 LEUKEMIA NOT HAVING ACHIEVED REMISSION, UNSPECIFIED LEUKEMIA TYPE: ICD-10-CM

## 2023-03-15 DIAGNOSIS — C92.10 BLAST CRISIS PHASE OF CHRONIC MYELOID LEUKEMIA: Primary | ICD-10-CM

## 2023-03-15 LAB
ALBUMIN SERPL-MCNC: 4.5 G/DL (ref 3.5–5.2)
ALBUMIN/GLOB SERPL: 2 G/DL
ALP SERPL-CCNC: 666 U/L (ref 39–117)
ALT SERPL W P-5'-P-CCNC: 28 U/L (ref 1–33)
ANION GAP SERPL CALCULATED.3IONS-SCNC: 11 MMOL/L (ref 5–15)
AST SERPL-CCNC: 27 U/L (ref 1–32)
BASOPHILS # BLD AUTO: 0.08 10*3/MM3 (ref 0–0.2)
BASOPHILS NFR BLD AUTO: 1 % (ref 0–1.5)
BILIRUB SERPL-MCNC: 1.4 MG/DL (ref 0–1.2)
BUN SERPL-MCNC: 17 MG/DL (ref 6–20)
BUN/CREAT SERPL: 26.2 (ref 7–25)
CALCIUM SPEC-SCNC: 9.1 MG/DL (ref 8.6–10.5)
CHLORIDE SERPL-SCNC: 102 MMOL/L (ref 98–107)
CO2 SERPL-SCNC: 27 MMOL/L (ref 22–29)
CREAT SERPL-MCNC: 0.65 MG/DL (ref 0.57–1)
DEPRECATED RDW RBC AUTO: 65.1 FL (ref 37–54)
EGFRCR SERPLBLD CKD-EPI 2021: 109.4 ML/MIN/1.73
EOSINOPHIL # BLD AUTO: 0.17 10*3/MM3 (ref 0–0.4)
EOSINOPHIL NFR BLD AUTO: 2.1 % (ref 0.3–6.2)
ERYTHROCYTE [DISTWIDTH] IN BLOOD BY AUTOMATED COUNT: 21.5 % (ref 12.3–15.4)
GLOBULIN UR ELPH-MCNC: 2.3 GM/DL
GLUCOSE SERPL-MCNC: 298 MG/DL (ref 65–99)
HCT VFR BLD AUTO: 29.7 % (ref 34–46.6)
HGB BLD-MCNC: 9.1 G/DL (ref 12–15.9)
HOLD SPECIMEN: NORMAL
LYMPHOCYTES # BLD AUTO: 1.15 10*3/MM3 (ref 0.7–3.1)
LYMPHOCYTES NFR BLD AUTO: 14.4 % (ref 19.6–45.3)
MAGNESIUM SERPL-MCNC: 1.5 MG/DL (ref 1.6–2.6)
MCH RBC QN AUTO: 26.9 PG (ref 26.6–33)
MCHC RBC AUTO-ENTMCNC: 30.6 G/DL (ref 31.5–35.7)
MCV RBC AUTO: 87.9 FL (ref 79–97)
MONOCYTES # BLD AUTO: 0.61 10*3/MM3 (ref 0.1–0.9)
MONOCYTES NFR BLD AUTO: 7.6 % (ref 5–12)
NEUTROPHILS NFR BLD AUTO: 5.97 10*3/MM3 (ref 1.7–7)
NEUTROPHILS NFR BLD AUTO: 74.9 % (ref 42.7–76)
PLATELET # BLD AUTO: 571 10*3/MM3 (ref 140–450)
PMV BLD AUTO: 9.6 FL (ref 6–12)
POTASSIUM SERPL-SCNC: 4.7 MMOL/L (ref 3.5–5.2)
PROT SERPL-MCNC: 6.8 G/DL (ref 6–8.5)
RBC # BLD AUTO: 3.38 10*6/MM3 (ref 3.77–5.28)
SODIUM SERPL-SCNC: 140 MMOL/L (ref 136–145)
URATE SERPL-MCNC: 10.6 MG/DL (ref 2.4–5.7)
WBC NRBC COR # BLD: 7.98 10*3/MM3 (ref 3.4–10.8)

## 2023-03-15 PROCEDURE — 1125F AMNT PAIN NOTED PAIN PRSNT: CPT | Performed by: INTERNAL MEDICINE

## 2023-03-15 PROCEDURE — 83735 ASSAY OF MAGNESIUM: CPT | Performed by: INTERNAL MEDICINE

## 2023-03-15 PROCEDURE — 84550 ASSAY OF BLOOD/URIC ACID: CPT | Performed by: INTERNAL MEDICINE

## 2023-03-15 PROCEDURE — 99215 OFFICE O/P EST HI 40 MIN: CPT | Performed by: INTERNAL MEDICINE

## 2023-03-15 PROCEDURE — 80053 COMPREHEN METABOLIC PANEL: CPT | Performed by: INTERNAL MEDICINE

## 2023-03-15 PROCEDURE — 36415 COLL VENOUS BLD VENIPUNCTURE: CPT

## 2023-03-15 PROCEDURE — 85025 COMPLETE CBC W/AUTO DIFF WBC: CPT

## 2023-03-15 RX ORDER — PROCHLORPERAZINE MALEATE 10 MG
10 TABLET ORAL EVERY 6 HOURS PRN
Qty: 30 TABLET | Refills: 1 | Status: SHIPPED | OUTPATIENT
Start: 2023-03-15

## 2023-03-15 RX ORDER — ONDANSETRON HYDROCHLORIDE 8 MG/1
8 TABLET, FILM COATED ORAL 3 TIMES DAILY PRN
Qty: 30 TABLET | Refills: 5 | Status: SHIPPED | OUTPATIENT
Start: 2023-03-15 | End: 2023-03-23

## 2023-03-15 RX ORDER — POTASSIUM CHLORIDE 20 MEQ/1
1 TABLET, EXTENDED RELEASE ORAL DAILY
COMMUNITY
Start: 2023-03-10

## 2023-03-15 NOTE — PROGRESS NOTES
Hematology/Oncology Outpatient Follow Up    PATIENT NAME:Nieves Mc  :1975  MRN: [id number]  PRIMARY CARE PHYSICIAN: Alida Latham APRN  REFERRING PHYSICIAN: Alida Latham, *    Chief Complaint   Patient presents with   • Follow-up     CML (chronic myelocytic leukemia) (HCC)        HISTORY OF PRESENT ILLNESS:      Patient is a 47 y.o. female with PMH significant for CML on treatment with Imatinib.  Patient stated that she typically feels poorly with Imatinib with frequent nausea and vomiting.  Also complained of weakness and fatigue.  Patient presented to ED on 10/22/18 with complaints of an elevated blood sugar around 400.  Stated she felt poorly and has had a productive cough with yellow sputum.  Complained of nausea and stated she was unable to keep any food down anytime she ate.  The patient reported subjective fever without chills.  She stated she had been coughing so hard that she was vomiting.  She denied hematemesis.  Denied diarrhea, constipation or blood in her stool.       Patient’s chest x-ray showed no evidence of acute pulmonary embolus.  The patient has ground-glass opacities throughout the lungs.  There is some air trapping noted which would appear to be   infectious.  Patient was started on antibiotics.      Hem/Onc was consulted on 10/23/18 for co-management of patient with CML.  Patient was seen by Dr. Lerma on 10/12 on previous admission for patient reported CML on Imatinib (Gleevec).  Patient reports she is under the care of Dr. Roach at [facility] in [place].  Patient was seen by Dr. Lerma in May 2018 when patient presented with hematuria, which was attributed to her Imatinib.  Dr. Lerma followed during hospitalization but then patient returned to Dr. Roach for her usual follow up.  She was again seen on 10/12.  Patient is stating she will switch to Dr. Lerma.    · Review of Dr. Roach’s note:  On 18 patient had BCR-abl which was 61.326.   Patient had been on Imatinib 400 mg daily.  She had presented in March 2018 with WBC of 24, hemoglobin 13.1, platelet count of 1,067,000.  Patient apparently had follow up BCR-abl in August 2018, results are not available for review.  Her bone marrow aspiration and biopsy was also performed at that time is currently not available for review.    · 11/6/18 - .  Uric acid 6.5.  BCR-abl by RT-PCR was measured at 3.36%.    · Due to thrombocytosis, patient was placed on Hydroxyurea 500 mg twice a day on 12/11/18.  Platelet count juana to 900,000.  Patient was noncompliant with CBCs that were recommended.    Patient was asked to return to the office after not being able to reach her.   · 12/27/18 - Bone marrow aspiration and biopsy was consistent with chronic myeloid leukemia.  BCR-abl positive, chronic phase.  ABL kinase mutational analysis is currently pending on the bone marrow.    · 1/3/19 - Repeat CBC, WBC 17, hemoglobin 11.9, platelet count 1,072,000.  Hydroxyurea was increased to 1 gm twice a day with follow up CBC.    · 1/6/19 - Follow up CBC showed progressive increase in platelet to 1.1 million.  Patient was offered admission to the hospital, which she declined.  Hydroxyurea was increased to 1 gm three   times a day as of 1/6/19.   · 1/7/19 - EKG shows sinus tachycardia.   milliseconds.   ·  ABL kinase on peripheral blood was ordered on 1/11/19.  Based on sequence analysis, there was no mutation detected.    · 2/12/19 - Patient was initiated on Tasigna 300 mg twice a day.  · 2/13/19 - Urinalysis was negative for hematuria.   · 2/22/19 -  milliseconds.  · 3/13/19 - EKG with QTC is 413 milliseconds.  Nonspecific changes noted.    · 4/18/19 - EKG showed QTC prolonged to 453 milliseconds.  This is changed from prior.  · 4/18/19 - Free T4 normal at 0.91.  Magnesium was 1.7.  BUN is 6.  Creatinine 0.7.  Bilirubin 2.2.  Phosphorous normal 3.7.  TSH 0.43, normal.  Free T3 was normal at 3.47.  Ionized  calcium   normal at 1.23.  BCR-abl was 0.522%.    · 5/7/19 - EKG showed QTC of 441 milliseconds.  QT was 366.     · Patient has been lost to follow-up since 2019 for CML.  Patient states that she lost her insurance.  She also does not have any primary care physician at this time.  She is diabetic on insulin.  · Patient was recently admitted to the hospital for pneumonia due to COVID-19 infection.  At that time patient made a decision to become compliant with treatment.  She is currently on to [employer]a 300 mg p.o. twice a day.  She is also on hydroxyurea 1 g twice a day.  Overall she feels better, she has more energy.  · 3/1/2022 white count is 53.92, hemoglobin 11.7 and platelets are 472    · 6/1/2022: Patient has not been seen since March 2022.  Also verified that she has not been taking to Tasigna since February 2022.  She comes in today with cough for 1 and half months, shortness of breath, denies fevers.  · 6/30/2022 patient had 2D echocardiogram which showed an EF of 41 to 45%.  Left ventricular cavity is mildly dilated.  She was diagnosed with nonischemic cardiomyopathy    · 11/18/2022: Patient was transferred from [facility] to [facility] due to cardiac failure.  She was then seen by NP [facility] hematology department.  Patient underwent tumor aspiration and biopsy at that time did not show chronic myeloid leukemia in accelerated phase markedly hypercellular marrow with megakaryocytic and myeloid hyperplasia with atypia and increased basophils blasts are not increased less than 1% moderate reticulin fibrosis.  According to the patient hydroxyurea was discontinued she was prescribed Gleevec 600 mg daily but she has only been taking 400 mg as she cannot find other milligram tablets.  She is already been pump inserted into her pelvic area.  She continues to experience nausea which resulted in her not taking the baclofen she should.  · 1/12/2023: WBC 17.64, hemoglobin 10.2, MCV 88.8,  platelets 458,000.  On Gleevec 600 mg daily and hydroxyurea 500 mg twice daily.  · 2023: WBC 10.67, hemoglobin 10.0, MCV 86.4, platelets 370,000.  Patient on Gleevec 600 mg daily and hydroxyurea 500 mg once a day.  Patient instructed at the visit to discontinue her hydroxyurea.  · 2023: WBC 17.75, hemoglobin 10.4, MCV 87.2, platelets 425,000.  On Gleevec 600 mg daily.    Past Medical History:   Diagnosis Date   • Bone pain    • COVID-19 virus infection 2022   • Diabetes mellitus (HCC)    • Extremity pain     Carlin. legs pain   • Leg pain     left leg greater   • Migraine    • Pulmonary embolism (HCC)    • Vision loss     doing surgery       Past Surgical History:   Procedure Laterality Date   • BONE MARROW BIOPSY     • BREAST SURGERY     • BRONCHOSCOPY N/A 2022    Procedure: BRONCHOSCOPY bilateral lung washing;  Surgeon: Charlene Camara MD;  [facility];  Service: Pulmonary;  Laterality: N/A;  post: rule out infection vs transfusion lung injury   •  SECTION     • CHOLECYSTECTOMY     • EYE SURGERY      laser surgery due  to hemmorage--- 2021-- another surgery  lt eye11/15/21   • RETINAL DETACHMENT SURGERY     • SPINE SURGERY      Lombardi spinal block   • TUBAL ABDOMINAL LIGATION           Current Outpatient Medications:   •  ALPRAZolam (Xanax) 0.5 MG tablet, Take 1 tablet by mouth At Night As Needed for Anxiety., Disp: 30 tablet, Rfl: 0  •  acetaminophen (TYLENOL) 325 MG tablet, 2 tablets., Disp: , Rfl:   •  allopurinol (ZYLOPRIM) 100 MG tablet, Take 4 tablets by mouth 2 (Two) Times a Day., Disp: 240 tablet, Rfl: 0  •  allopurinol (Zyloprim) 300 MG tablet, Take 1 tablet by mouth Daily., Disp: 30 tablet, Rfl: 2  •  budesonide-formoterol (SYMBICORT) 160-4.5 MCG/ACT inhaler, Inhale 2 puffs 2 (Two) Times a Day., Disp: 10.2 g, Rfl: 1  •  dapagliflozin Propanediol (Farxiga) 10 MG tablet, Take 10 mg by mouth Daily., Disp: 90 tablet, Rfl: 3  •  furosemide (LASIX) 20 MG tablet, Take  1 tablet by mouth Daily., Disp: , Rfl:   •  furosemide (LASIX) 40 MG tablet, Take 1 tablet by mouth 2 (Two) Times a Day for 14 days., Disp: 28 tablet, Rfl: 3  •  hydrOXYzine (ATARAX) 25 MG tablet, Take 1 tablet by mouth Every 8 (Eight) Hours As Needed., Disp: , Rfl:   •  Insulin Glargine (BASAGLAR KWIKPEN) 100 UNIT/ML injection pen, Inject 20 Units under the skin into the appropriate area as directed Daily., Disp: 10 mL, Rfl: 3  •  Insulin Lispro (ADMELOG SOLOSTAR) 100 UNIT/ML injection pen, Inject 6 Units under the skin into the appropriate area as directed 3 (Three) Times a Day., Disp: 3 mL, Rfl: 3  •  losartan (COZAAR) 25 MG tablet, Take 1 tablet by mouth Daily., Disp: 90 tablet, Rfl: 3  •  metoprolol succinate XL (TOPROL-XL) 25 MG 24 hr tablet, Take 1 tablet by mouth Daily., Disp: 90 tablet, Rfl: 3  •  mirtazapine (REMERON) 15 MG tablet, Take 1 tablet by mouth every night at bedtime., Disp: , Rfl:   •  ondansetron (ZOFRAN) 8 MG tablet, Take 1 tablet by mouth 3 (Three) Times a Day As Needed for Nausea or Vomiting., Disp: 30 tablet, Rfl: 5  •  oxymetazoline (AFRIN) 0.05 % nasal spray,  2 Spray, Nostrils Both, Spray, BID, 0 Refill(s), Disp: , Rfl:   •  PONATinib (ICLUSIG) 45 MG chemo tablet, Take 1 tablet by mouth Daily. Take with or without food.  Swallow tablets whole, do not chew, crush, or dissovle, Disp: 30 tablet, Rfl: 2  •  potassium chloride (K-DUR,KLOR-CON) 20 MEQ CR tablet, Take 1 tablet by mouth Daily., Disp: , Rfl:   •  potassium chloride (K-TAB) 20 MEQ tablet controlled-release ER tablet, Take 1 tablet by mouth Daily., Disp: 90 tablet, Rfl: 3  •  prochlorperazine (COMPAZINE) 10 MG tablet, Take 1 tablet by mouth Every 6 (Six) Hours As Needed for Nausea or Vomiting., Disp: 30 tablet, Rfl: 1  •  traZODone (DESYREL) 50 MG tablet, Take 0.5 tablets by mouth Every Night., Disp: 30 tablet, Rfl: 2    No Known Allergies    Family History   Problem Relation Age of Onset   • Diabetes Mother    • Diabetes Maternal  "Grandmother    • Heart attack Maternal Grandmother    • Stroke Maternal Grandmother        Cancer-related family history is not on file.    Social History     Tobacco Use   • Smoking status: Never   • Smokeless tobacco: Never   Vaping Use   • Vaping Use: Never used   Substance Use Topics   • Alcohol use: No   • Drug use: No     I have reviewed and confirmed the accuracy of the patient's history: Chief complaint, HPI, ROS and Subjective as entered by the MA/LPN/RN. Meghann Lerma MD 03/16/23     SUBJECTIVE:    Patient does not have any new issues.  She now has ponatinib to start.  She has completed her chemo education    REVIEW OF SYSTEMS:    Review of Systems   Constitutional: Negative for chills and fever.   HENT: Negative for ear pain, mouth sores, nosebleeds and sore throat.    Eyes: Negative for photophobia and visual disturbance.   Respiratory: Negative for wheezing and stridor.    Cardiovascular: Negative for chest pain and palpitations.   Gastrointestinal: Negative for abdominal pain, diarrhea, nausea and vomiting.   Endocrine: Negative for cold intolerance and heat intolerance.   Genitourinary: Negative for dysuria and hematuria.   Musculoskeletal: Negative for joint swelling and neck stiffness.   Skin: Negative for color change and rash.   Neurological: Negative for seizures and syncope.   Hematological: Negative for adenopathy.        No obvious bleeding   Psychiatric/Behavioral: Negative for agitation, confusion and hallucinations. Positive for insomnia, anxiety, depression.      OBJECTIVE:    Vitals:    03/15/23 1530   BP: 131/82   Pulse: 112   Resp: 18   Temp: 97 °F (36.1 °C)   TempSrc: Infrared   Weight: 71.2 kg (157 lb)   Height: 162.6 cm (64\")   PainSc:   8     Body mass index is 26.95 kg/m².    ECOG  (1) Restricted in physically strenuous activity, ambulatory and able to do work of light nature    Physical Exam  Vitals and nursing note reviewed.   Constitutional:       General: She is not in " acute distress.     Appearance: She is not diaphoretic.   HENT:      Head: Normocephalic and atraumatic.   Eyes:      General: No scleral icterus.        Right eye: No discharge.         Left eye: No discharge.      Conjunctiva/sclera: Conjunctivae normal.   Neck:      Thyroid: No thyromegaly.   Cardiovascular:      Rate and Rhythm: Normal rate and regular rhythm.      Heart sounds: Normal heart sounds. No friction rub. No gallop.    Pulmonary:      Effort: Pulmonary effort is normal. No respiratory distress.      Breath sounds: No stridor. No wheezing.   Abdominal:      General: Bowel sounds are normal.      Palpations: Abdomen is soft. There is no mass.      Tenderness: There is no abdominal tenderness. There is no guarding or rebound.   Musculoskeletal:         General: No tenderness. Normal range of motion.      Cervical back: Normal range of motion and neck supple.   Lymphadenopathy:      Cervical: No cervical adenopathy.   Skin:     General: Skin is warm.      Findings: No erythema or rash.   Neurological:      Mental Status: She is alert and oriented to person, place, and time.      Motor: No abnormal muscle tone.   Psychiatric:         Behavior: Behavior anxious and tearful    I have reexamined the patient and the results are consistent with the previously documented exam. Meghann Lerma MD       RECENT LABS    WBC   Date Value Ref Range Status   03/15/2023 7.98 3.40 - 10.80 10*3/mm3 Final     RBC   Date Value Ref Range Status   03/15/2023 3.38 (L) 3.77 - 5.28 10*6/mm3 Final     Hemoglobin   Date Value Ref Range Status   03/15/2023 9.1 (L) 12.0 - 15.9 g/dL Final     Hematocrit   Date Value Ref Range Status   03/15/2023 29.7 (L) 34.0 - 46.6 % Final     MCV   Date Value Ref Range Status   03/15/2023 87.9 79.0 - 97.0 fL Final     MCH   Date Value Ref Range Status   03/15/2023 26.9 26.6 - 33.0 pg Final     MCHC   Date Value Ref Range Status   03/15/2023 30.6 (L) 31.5 - 35.7 g/dL Final     RDW   Date  Value Ref Range Status   03/15/2023 21.5 (H) 12.3 - 15.4 % Final     RDW-SD   Date Value Ref Range Status   03/15/2023 65.1 (H) 37.0 - 54.0 fl Final     MPV   Date Value Ref Range Status   03/15/2023 9.6 6.0 - 12.0 fL Final     Platelets   Date Value Ref Range Status   03/15/2023 571 (H) 140 - 450 10*3/mm3 Final     Neutrophil %   Date Value Ref Range Status   03/15/2023 74.9 42.7 - 76.0 % Final     Lymphocyte %   Date Value Ref Range Status   03/15/2023 14.4 (L) 19.6 - 45.3 % Final     Monocyte %   Date Value Ref Range Status   03/15/2023 7.6 5.0 - 12.0 % Final     Eosinophil %   Date Value Ref Range Status   03/15/2023 2.1 0.3 - 6.2 % Final     Basophil %   Date Value Ref Range Status   03/15/2023 1.0 0.0 - 1.5 % Final     External Neutrophils Abs   Date Value Ref Range Status   11/30/2022 7.6 (H) 1.7 - 6.0 x10(3)/ul Final     Neutrophils, Absolute   Date Value Ref Range Status   03/15/2023 5.97 1.70 - 7.00 10*3/mm3 Final     Lymphocytes, Absolute   Date Value Ref Range Status   03/15/2023 1.15 0.70 - 3.10 10*3/mm3 Final     Monocytes, Absolute   Date Value Ref Range Status   03/15/2023 0.61 0.10 - 0.90 10*3/mm3 Final     Eosinophils, Absolute   Date Value Ref Range Status   03/15/2023 0.17 0.00 - 0.40 10*3/mm3 Final     Basophils, Absolute   Date Value Ref Range Status   03/15/2023 0.08 0.00 - 0.20 10*3/mm3 Final     nRBC   Date Value Ref Range Status   11/11/2022 2.0 (H) 0.0 - 0.2 /100 WBC Final   10/16/2019 0.2 0.0 - 0.2 /100 WBC Final       Lab Results   Component Value Date    GLUCOSE 298 (H) 03/15/2023    BUN 17 03/15/2023    CREATININE 0.65 03/15/2023    EGFRIFNONA 133 02/02/2022    BCR 26.2 (H) 03/15/2023    K 4.7 03/15/2023    CO2 27.0 03/15/2023    CALCIUM 9.1 03/15/2023    ALBUMIN 4.5 03/15/2023    LABIL2 1.0 04/18/2019    AST 27 03/15/2023    ALT 28 03/15/2023           ASSESSMENT    · Accelerated phase CML, status post bone marrow biopsy on 11/3/2022.  Currently on Gleevec 600 mg daily.  This has not  controlled her malignancy well for that reason we will switch patient to Ponatinib at 5 mg p.o. daily.  Reviewed the benefits, the side effects in detail  · She will now discontinue Gleevec and decrease hydroxyurea to 500 mg twice a day  · BCR ABL kinase mutation assay was negative  · Cardiomyopathy her most recent echo shows an EF of 44% which is an improvement from 26 to 30%.  She will continue to follow-up with her cardiologist  · Pain management, status post pain pump insertion.  She will follow-up with pain management at [place] or [facility].  Her pain management seems to be adequate  · Anxiety: We will go ahead and refill Xanax  · CML in chronic phase with no significant hematologic or molecular or cytogenetic response on   Imatinib.  ABL kinase mutational analysis was negative.  We  have represcribed to Tasigna 300 mg twice a day.  Patient has been noncompliant with treatments and ended up in the hospital with hyperleukocytosis, peripheral blood blasts.  Bone marrow biopsy suggest that she remains in chronic phase.  Continue to Tasiigna at the current doses.  Hydroxyurea has been discontinued.  · Uncontrolled diabetes type 1, followed at the [facility] by Dr. Calles  · Chronic anemia: Stable, multifactorial from CML, to Tasigna, hydroxyurea.  This has improved  · Insomnia  · Situational anxiety, not suicidal.  Continue Xanax 0.5 mg once daily.  · Hyperleukocytosis secondary to CML  · History of medical noncompliance  · Pulmonary embolism: Xarelto restarted  · Recent COVID-19 pneumonia  · History of prolonged QTC        Plans    · Discontinue Gleevac  · Begin ponatinib  · Change hydroxyurea to 500 mg twice a day  · Ponatinib 45 mg p.o. daily.  She was given information on this drug to review  · BCR ABL kinase mutational analysis was negative  · Request additional records from [facility]  · Informed patient that she will be seen on a weekly basis for  now  · Continuetrazodone 25 mg p.o. nightly for insomnia related to anxiety and depression  · Monitor electrolytes closely  · EKG reviewed.  She has slight prolongation of QT.  We will discontinue Zofran and Phenergan and use Compazine as needed for nausea.  Prescription has been sent to her pharmacy  • GI consult for continued nausea and vomiting and reported dark emesis.  She had EGD and colonoscopy coming up but they are requesting $400 upfront which she does not have.  I have asked  to investigate if there is an affordable option.  • Due to worsening anxiety increase Xanax to 0.5 mg p.o. nightly as needed number 30 pills.  Continue this for now.  Asked patient to take Xanax in the late afternoon due to the addition of trazodone at night.  • All questions answered  • Continue to follow-up with PCP per routine  • Follow-up with pain management per routine  • Follow-up with nurse practitioner early next week  • Would also obtain EKG early next week as well   • Follow-up with me first week in April        I spent 40 total minutes, face-to-face, caring for Hope today.  90% of this time involved counseling and/or coordination of care as documented within this note.

## 2023-03-15 NOTE — PROGRESS NOTES
Specialty Pharmacy Patient Management Program  Oncology Initial Assessment       Nieves Mc is a 47 y.o. female with chronic myelogenous leukemia seen by an Dr. Lerma, Oncology provider, and enrolled in the Oncology Patient Management program offered by [facility].  An initial outreach was conducted, including assessment of therapy appropriateness and specialty medication education for Iclusig (ponatinib). The patient was introduced to services offered by [facility], including: regular assessments, prior authorization maintenance, and financial assistance programs as applicable. The patient was also provided with contact information for the pharmacy team.     Regimen: Ponatinib 45 mg PO daily    Start date of oral specialty medication: 3/16/23    Relevant Past Medical History, Comorbidities, and Vaccines  Relevant medical history and concomitant health conditions were discussed with the patient. The patient's chart has been reviewed for relevant past medical history and comorbid health conditions and updated as necessary.  Vaccines are coordinated by the patient's oncologist and primary care provider.  Past Medical History:   Diagnosis Date   • Bone pain    • COVID-19 virus infection 01/12/2022   • Diabetes mellitus (HCC)    • Extremity pain     Carlin. legs pain   • Leg pain     left leg greater   • Migraine    • Pulmonary embolism (HCC)    • Vision loss     doing surgery     Social History     Socioeconomic History   • Marital status:    Tobacco Use   • Smoking status: Never   • Smokeless tobacco: Never   Vaping Use   • Vaping Use: Never used   Substance and Sexual Activity   • Alcohol use: No   • Drug use: No   • Sexual activity: Defer       Allergies  Known allergies and reactions were discussed with the patient. The patient's chart has been reviewed for allergy information and updated as necessary.   No Known Allergies     Current Medication List  This  medication list has been reviewed with the patient and evaluated for any interactions or necessary modifications/recommendations, and updated to include all prescription medications, OTC medications, and supplements the patient is currently taking.  This list reflects what is contained in the patient's profile, which has also been marked as reviewed to communicate to other providers it is the most up to date version of the patient's current medication therapy.   Prior to Admission medications    Medication Sig Start Date End Date Taking? Authorizing Provider   acetaminophen (TYLENOL) 325 MG tablet 2 tablets. 11/17/22   Melecio Almanzar MD   allopurinol (ZYLOPRIM) 100 MG tablet Take 4 tablets by mouth 2 (Two) Times a Day. 3/10/23   Meghann Lerma MD   allopurinol (Zyloprim) 300 MG tablet Take 1 tablet by mouth Daily. 2/20/23   Denisha Gill APRN   ALPRAZolam (Xanax) 0.5 MG tablet Take 1 tablet by mouth At Night As Needed for Anxiety. 2/13/23   Denisha Gill APRN   budesonide-formoterol (SYMBICORT) 160-4.5 MCG/ACT inhaler Inhale 2 puffs 2 (Two) Times a Day. 8/5/22   Villa Patel MD   dapagliflozin Propanediol (Farxiga) 10 MG tablet Take 10 mg by mouth Daily. 3/10/23   Jane Hopper MD   furosemide (LASIX) 20 MG tablet Take 1 tablet by mouth Daily. 12/19/22   Melecio Almanzar MD   furosemide (LASIX) 40 MG tablet Take 1 tablet by mouth 2 (Two) Times a Day for 14 days. 3/10/23 3/24/23  Jane Hopper MD   hydrOXYzine (ATARAX) 25 MG tablet Take 1 tablet by mouth Every 8 (Eight) Hours As Needed. 1/4/23   Melecio Almanzar MD   Insulin Glargine (BASAGLAR KWIKPEN) 100 UNIT/ML injection pen Inject 20 Units under the skin into the appropriate area as directed Daily. 7/10/22   Anthony Herndon DO   Insulin Lispro (ADMELOG SOLOSTAR) 100 UNIT/ML injection pen Inject 6 Units under the skin into the appropriate area as directed 3 (Three) Times a Day. 7/9/22   Yanick  Anthony MCCULLOUGH DO   losartan (COZAAR) 25 MG tablet Take 1 tablet by mouth Daily. 3/10/23   Jane Hopper MD   metoprolol succinate XL (TOPROL-XL) 25 MG 24 hr tablet Take 1 tablet by mouth Daily. 3/10/23   Jane Hopper MD   mirtazapine (REMERON) 15 MG tablet Take 1 tablet by mouth every night at bedtime. 2/24/23   Melecio Almanzar MD   ondansetron (ZOFRAN) 8 MG tablet Take 1 tablet by mouth 3 (Three) Times a Day As Needed for Nausea or Vomiting. 3/15/23   Meghann Lerma MD   oxymetazoline (AFRIN) 0.05 % nasal spray   2 Spray, Nostrils Both, Spray, BID, 0 Refill(s) 11/17/22   Melecio Almanzar MD   PONATinib (ICLUSIG) 45 MG chemo tablet Take 1 tablet by mouth Daily. Take with or without food.  Swallow tablets whole, do not chew, crush, or dissovle 3/15/23   Meghann Lerma MD   potassium chloride (K-DUR,KLOR-CON) 20 MEQ CR tablet Take 1 tablet by mouth Daily. 3/10/23   Melecio Almanzar MD   potassium chloride (K-TAB) 20 MEQ tablet controlled-release ER tablet Take 1 tablet by mouth Daily. 3/10/23   Jane Hopper MD   promethazine (PHENERGAN) 12.5 MG tablet TAKE 1 TABLET BY MOUTH EVERY 4 HOURS as needed FOR nausea and/or vomiting 12/19/22   Melecio Almanzar MD   sodium polystyrene (KAYEXALATE) 15 GM/60ML suspension Take 60 mL by mouth 1 (One) Time for 1 dose. 2/22/23 2/22/23  Denisha Gill APRN   sodium polystyrene (KAYEXALATE) 15 GM/60ML suspension Take 60 mL by mouth 1 (One) Time for 1 dose. 3/3/23 3/3/23  Denisha Gill APRN   traZODone (DESYREL) 50 MG tablet Take 0.5 tablets by mouth Every Night. 3/2/23   Denisha Gill APRN   allopurinol (ZYLOPRIM) 100 MG tablet Take 1 tablet by mouth 2 (Two) Times a Day. 11/7/22 2/20/23  Steve Lemos MD   allopurinol (ZYLOPRIM) 300 MG tablet 300 mg. 11/17/22 2/20/23  Provider, MD Melecio   carvedilol (COREG) 6.25 MG tablet Take 1 tablet by mouth 2 (Two) Times a Day With Meals for 30 days. 11/7/22 3/10/23  Canelo  MD Steve   citalopram (CeleXA) 10 MG tablet Take 1 tablet by mouth Daily. To begin 1/31/22 11/30/22 3/2/23  Meghann Lerma MD   dapagliflozin Propanediol (Farxiga) 10 MG tablet 10 mg. 11/17/22 3/10/23  Melecio Almanzar MD   folic acid (FOLVITE) 1 MG tablet Take 1 tablet by mouth Daily. 7/9/22 3/10/23  Anthony Herndon DO   gabapentin (NEURONTIN) 300 MG capsule 2 capsules. 11/29/22 3/10/23  Melecio Almanzar MD   hydroxyurea (HYDREA) 500 MG capsule Take 2 capsules by mouth 2 (Two) Times a Day. 2/13/23 3/10/23  Denisha Gill APRN   imatinib (GLEEVEC) 100 MG chemo tablet Take 2 tablets by mouth with 1 other imatinib prescription for 600 mg total Daily. Take with food and large glass of water; Do not take with Grapefruit juice. 1/9/23 3/15/23  Meghann Lerma MD   imatinib (GLEEVEC) 400 MG chemo tablet Take 1 tablet by mouth with 1 other imatinib prescription for 600 mg total Daily. Take with food and large glass of water; Do not take with Grapefruit juice. 1/9/23 3/15/23  Meghann Lerma MD   Insulin Glargine, 1 Unit Dial, (TOUJEO) 300 UNIT/ML solution pen-injector injection   20 Units 0.2 mL, SubCutaneous, Inj, Daily, 0 Refill(s) 11/17/22 3/10/23  Melecio Almanzar MD   losartan (COZAAR) 25 MG tablet Take 1 tablet by mouth Daily. 7/9/22 3/10/23  Anthony Herndon DO   metoprolol succinate XL (TOPROL-XL) 25 MG 24 hr tablet 1 tablet. 11/29/22 3/10/23  Melecio Almanzar MD   nitrofurantoin, macrocrystal-monohydrate, (Macrobid) 100 MG capsule Take 1 capsule by mouth 2 (Two) Times a Day. 2/15/23 3/10/23  Gill, Denisha Karuna, APRN   omeprazole (priLOSEC) 40 MG capsule Take 1 capsule by mouth Daily. 1/19/23 3/10/23  Denisha Gill APRN   ondansetron ODT (ZOFRAN-ODT) 8 MG disintegrating tablet Place 1 tablet on the tongue Every 8 (Eight) Hours As Needed for Nausea or Vomiting. 10/31/22 3/10/23  Meghann Lerma MD   sacubitril-valsartan  (Entresto) 24-26 MG tablet   1 Tab, Oral, Tab, BID, # 60 Tab, 0 Refill(s) 11/17/22 3/10/23  Provider, MD Melecio   sodium polystyrene (KAYEXALATE) 15 GM/60ML suspension Take 60 mL by mouth 1 (One) Time for 1 dose. 2/20/23 2/22/23  Denisha Gill APRN       Drug Interactions  • Reviewed concomitant medications, allergies, labs, comorbidities/medical history, quality of life, and immunization history.   • Drug-drug interactions noted and discussed during education: no significant drug interactions noted. . Reminded the patient to let us know before making any changes or starting any new prescription or OTC medications so we can first assess drug interactions.  • Drug-food interactions noted and discussed during education: Patient was instructed to avoid eating grapefruit and drinking grapefruit juice    Recommended Medications Assessment  • Bone Health (such as calcium/vitamin D, bisphosphonate, RANKL inhibitor) - Not Indicated   • Tumor lysis syndrome prophylaxis - Risk for TLS Intermediate. The patient is currently on allopurinol 300 mg PO daily. Adequate hydration was discussed during education.    Relevant Laboratory Values  Lab Results   Component Value Date    GLUCOSE 332 (H) 03/02/2023    CALCIUM 8.9 03/02/2023     03/02/2023    K 5.4 (H) 03/02/2023    CO2 22.0 03/02/2023     03/02/2023    BUN 21 (H) 03/02/2023    CREATININE 0.92 03/02/2023    EGFRIFNONA 133 02/02/2022    BCR 22.8 03/02/2023    ANIONGAP 11.0 03/02/2023     Lab Results   Component Value Date    WBC 7.98 03/15/2023    RBC 3.38 (L) 03/15/2023    HGB 9.1 (L) 03/15/2023    HCT 29.7 (L) 03/15/2023    MCV 87.9 03/15/2023    MCH 26.9 03/15/2023    MCHC 30.6 (L) 03/15/2023    RDW 21.5 (H) 03/15/2023    RDWSD 65.1 (H) 03/15/2023    MPV 9.6 03/15/2023     (H) 03/15/2023    NEUTRORELPCT 74.9 03/15/2023    LYMPHORELPCT 14.4 (L) 03/15/2023    MONORELPCT 7.6 03/15/2023    EOSRELPCT 2.1 03/15/2023    BASORELPCT 1.0 03/15/2023     NEUTROABS 5.97 03/15/2023    LYMPHSABS 1.15 03/15/2023    MONOSABS 0.61 03/15/2023    EOSABS 0.17 03/15/2023    BASOSABS 0.08 03/15/2023    NRBC 2.0 (H) 11/11/2022       The above labs have been reviewed. No dose adjustments are needed for the oral specialty medication(s) based on the labs.    Initial Education Provided for Specialty Medication  The patient has been provided with the following education. All questions and concerns have been addressed prior to the patient receiving the medication, and the patient has verbalized understanding of the education and any materials provided.  Additional patient education shall be provided and documented upon request by the patient, provider or payer.      Provided patient with:   Education sheets about the medication, 24-hour clinic phone number and my contact information and instructions to call should additional questions arise. , Personalized Medication Action Plan    Medication Education Sheets Provided  Oral Specialty Medication: Iclusig (ponatinib)  Other Education Sheets Provided: (select all that apply)  Adherence, Blood Pressure Log, Constipation, Diarrhea and Symptom Tracker Sheet and MEGGAN Information, Home Instructions and after hours contact information, Tips From Your Chemotherapy Nurse, Understanding Your Blood, Cancer Related Fatigue, Fluids and Dehydration, Improving Your Veins, Mouth Care, Office Constipation and Diarrhea Protocols, Nausea and Appetite      COMMENTS   Store in the original container, in a dry location out of direct sunlight, and out of reach of children or pets. and Store at room temperature.  Discussed safe handling and what to do with any unused medication.   Take with or without food at the same time(s) each day.   Patient is likely to have good treatment adherence; reinforced the importance of adherence. Reviewed how to address missed doses and to let us know of any missed doses.   Reviewed the role of RBC and the use of transfusions if  hemoglobin decreases too much.  Patient to notify us if they experience shortness of breath, dizziness, or palpitations.  Also let patient know they could feel more tired than usual and to try to stay active, but rest if they need to.    Reviewed the role of platelets in blood clotting and when to call clinic (bloody nose that bleeds for 5 mins despite pressure, a cut that won't stop bleeding despite pressure, gums that bleed excessively with brushing or flossing). Recommended using an electric razor, soft bristle toothbrush, and blowing your nose gently.    Reviewed the role of WBC, good infection prevention practices, and when to call the clinic (temperature 100.4F, sore throat, burning urination, etc)  • COVID Vaccines: 2 doses received  • Flu Vaccine: Planning to receive 2022 flu vaccine   Discussed risk of decreased appetite. Recommended eating smaller, more frequent meals. Instructed the patient to contact clinic if they were losing weight or having difficulty eating enough to maintain their energy level., Increased Blood Sugar: This patient's oncology therapy can increase blood sugar.  Recommended the patient monitor blood sugar more closely and to contact their primary care provider for management recommendations if blood glucose values started trending up. and Electrolyte Abnormalities:  Explained the oncology therapy may lead to abnormalities in electrolytes, specifically: elevated lipase, low phosphorus, and low calcium levels   Chemotherapy - Discussed risk of diarrhea. Instructed patient that they can use OTC loperamide at first presentation of diarrhea, but call MD if 4-6 episodes in 24 hours not relieved by OTC loperamide.   Provided supplementary handout with instructions for use of docusate and other OTC therapies to manage constipation.  Instructed to call us if medications aren't working.   • Emetic risk: Low  • PRN home meds: Ondansetron  • Pharmacy home meds sent to: [facility]  Instructed the patient to take a dose of the PRN medication at the first onset of nausea and if it's not working to call us for additional medications.  Also provided non-drug measures to mitigate nausea.   Chemo - Discussed muscle and joint aches/pains with chemotherapy, and recommended the use of OTC pain relief with ibuprofen or acetaminophen if needed.   Discussed potential for a rash or itchy skin, offered nonpharmacologic prevention strategies, and instructed patient to call if a rash develops that worsens or gets larger       Discussed potential effects on organ systems, monitoring, diagnostic tests, labs, and when to notify their MD. Discussed the signs/symptoms of the following: cardiotoxicity, hepatotoxicity and hypertension - recommended close monitoring of blood pressure, provided BP log, and explained how to track values. Discussed increased risk of heart attack, blood clots, and stroke and to seek medical care.   • Financial Issues: Received medication from   • Lab Draws: On days 1, 15 of cycles 1-3, then starting with cycle 4 labs on day 1 of each cycle thereafter     Oral Oncology Therapy: Reviewed safe sex practices and minimizing exposure to body fluids while on oral oncology therapy.   Counseled on management of soiled linens and proper flush technique.  Discussed how to manage all the side effects at home and advised when to contact the MD office     Adherence and Self-Administration  • Barriers to Patient Adherence and/or Self-Administration: History of non-compliance, patient's mother assisting with medication   • Methods for Supporting Patient Adherence and/or Self-Administration: family assistance, gave information on medication management sonido to send reminders on phone  • Expected duration of therapy: Until disease progression or intolerable toxicity    Goals of Therapy  • Patient Goals of Therapy:   o Consistently take medications as prescribed  o Patient will adhere  to medication regimen  o Patient will report any medication side effects to healthcare provider  • Clinical Goals:    Goals     • Specialty Pharmacy General Goal      Clinical goal/therapeutic target: stable or decreasing BCR-ABL transcript           o Support patient understanding of medication regimen  o Ensure patient knows the pharmacy contact information  o Schedule regular follow-up to monitor the treatment serious adverse events  o Schedule regular follow-up to confirm medication adherence  o Schedule regular follow-up to monitor disease progression or stability    Quality of Life Assessment   The patient's current (pre-therapy) quality of life was discussed with the patient. The QOL segment of this outreach has been reviewed and updated.   • Quality of Life Score: 6/10    Reassessment Plan & Follow-Up  1. Pharmacist to perform regular reassessments no more than (6) months from the previous assessment.  2. Welcome information and patient satisfaction survey to be sent by retail team with patient's initial fill.  3. Care Coordinator to set up future refill outreaches, coordinate prescription delivery, and escalate clinical questions to pharmacist.     Additional Plans, Therapy Recommendations or Therapy Problems to Be Addressed: None     Attestation  I attest that the initiated specialty medication(s) are appropriate for the patient based on my assessment.  If the prescribed therapy is at any point deemed not appropriate based on the current or future assessments, a consultation will be initiated with the patient's specialty care provider to determine the best course of action. The revised plan of therapy will be documented along with any additional patient education provided.     Ros Matrinez, PharmD    Date and Time: 3/15/2023  15:58 EDT

## 2023-03-16 ENCOUNTER — SPECIALTY PHARMACY (OUTPATIENT)
Dept: PHARMACY | Facility: HOSPITAL | Age: 48
End: 2023-03-16
Payer: MEDICARE

## 2023-03-16 RX ORDER — ALPRAZOLAM 0.5 MG/1
0.5 TABLET ORAL NIGHTLY PRN
Qty: 30 TABLET | Refills: 0 | Status: SHIPPED | OUTPATIENT
Start: 2023-03-16

## 2023-03-22 NOTE — PROGRESS NOTES
Hematology/Oncology Outpatient Follow Up    PATIENT NAME:Nieves Mc  :1975  MRN: [id number]  PRIMARY CARE PHYSICIAN: Alida Latham APRN  REFERRING PHYSICIAN: Alida Latham, *    Chief Complaint   Patient presents with   • Follow-up     CML (chronic myelocytic leukemia)        HISTORY OF PRESENT ILLNESS:      Patient is a 47 y.o. female with PMH significant for CML on treatment with Imatinib.  Patient stated that she typically feels poorly with Imatinib with frequent nausea and vomiting.  Also complained of weakness and fatigue.  Patient presented to ED on 10/22/18 with complaints of an elevated blood sugar around 400.  Stated she felt poorly and has had a productive cough with yellow sputum.  Complained of nausea and stated she was unable to keep any food down anytime she ate.  The patient reported subjective fever without chills.  She stated she had been coughing so hard that she was vomiting.  She denied hematemesis.  Denied diarrhea, constipation or blood in her stool.       Patient’s chest x-ray showed no evidence of acute pulmonary embolus.  The patient has ground-glass opacities throughout the lungs.  There is some air trapping noted which would appear to be   infectious.  Patient was started on antibiotics.      Hem/Onc was consulted on 10/23/18 for co-management of patient with CML.  Patient was seen by Dr. Lerma on 10/12 on previous admission for patient reported CML on Imatinib (Gleevec).  Patient reports she is under the care of Dr. Roach at [facility] in [place].  Patient was seen by Dr. Lerma in May 2018 when patient presented with hematuria, which was attributed to her Imatinib.  Dr. Lerma followed during hospitalization but then patient returned to Dr. Roach for her usual follow up.  She was again seen on 10/12.  Patient is stating she will switch to Dr. Lerma.    · Review of Dr. Roach’s note:  On 18 patient had BCR-abl which was 61.326.  Patient  had been on Imatinib 400 mg daily.  She had presented in March 2018 with WBC of 24, hemoglobin 13.1, platelet count of 1,067,000.  Patient apparently had follow up BCR-abl in August 2018, results are not available for review.  Her bone marrow aspiration and biopsy was also performed at that time is currently not available for review.    · 11/6/18 - .  Uric acid 6.5.  BCR-abl by RT-PCR was measured at 3.36%.    · Due to thrombocytosis, patient was placed on Hydroxyurea 500 mg twice a day on 12/11/18.  Platelet count juana to 900,000.  Patient was noncompliant with CBCs that were recommended.    Patient was asked to return to the office after not being able to reach her.   · 12/27/18 - Bone marrow aspiration and biopsy was consistent with chronic myeloid leukemia.  BCR-abl positive, chronic phase.  ABL kinase mutational analysis is currently pending on the bone marrow.    · 1/3/19 - Repeat CBC, WBC 17, hemoglobin 11.9, platelet count 1,072,000.  Hydroxyurea was increased to 1 gm twice a day with follow up CBC.    · 1/6/19 - Follow up CBC showed progressive increase in platelet to 1.1 million.  Patient was offered admission to the hospital, which she declined.  Hydroxyurea was increased to 1 gm three   times a day as of 1/6/19.   · 1/7/19 - EKG shows sinus tachycardia.   milliseconds.   ·  ABL kinase on peripheral blood was ordered on 1/11/19.  Based on sequence analysis, there was no mutation detected.    · 2/12/19 - Patient was initiated on Tasigna 300 mg twice a day.  · 2/13/19 - Urinalysis was negative for hematuria.   · 2/22/19 -  milliseconds.  · 3/13/19 - EKG with QTC is 413 milliseconds.  Nonspecific changes noted.    · 4/18/19 - EKG showed QTC prolonged to 453 milliseconds.  This is changed from prior.  · 4/18/19 - Free T4 normal at 0.91.  Magnesium was 1.7.  BUN is 6.  Creatinine 0.7.  Bilirubin 2.2.  Phosphorous normal 3.7.  TSH 0.43, normal.  Free T3 was normal at 3.47.  Ionized  calcium   normal at 1.23.  BCR-abl was 0.522%.    · 5/7/19 - EKG showed QTC of 441 milliseconds.  QT was 366.     · Patient has been lost to follow-up since 2019 for CML.  Patient states that she lost her insurance.  She also does not have any primary care physician at this time.  She is diabetic on insulin.  · Patient was recently admitted to the hospital for pneumonia due to COVID-19 infection.  At that time patient made a decision to become compliant with treatment.  She is currently on to [employer]a 300 mg p.o. twice a day.  She is also on hydroxyurea 1 g twice a day.  Overall she feels better, she has more energy.  · 3/1/2022 white count is 53.92, hemoglobin 11.7 and platelets are 472    · 6/1/2022: Patient has not been seen since March 2022.  Also verified that she has not been taking to Tasigna since February 2022.  She comes in today with cough for 1 and half months, shortness of breath, denies fevers.  · 6/30/2022 patient had 2D echocardiogram which showed an EF of 41 to 45%.  Left ventricular cavity is mildly dilated.  She was diagnosed with nonischemic cardiomyopathy    · 11/18/2022: Patient was transferred from [facility] to [facility] due to cardiac failure.  She was then seen by NP [facility] hematology department.  Patient underwent tumor aspiration and biopsy at that time did not show chronic myeloid leukemia in accelerated phase markedly hypercellular marrow with megakaryocytic and myeloid hyperplasia with atypia and increased basophils blasts are not increased less than 1% moderate reticulin fibrosis.  According to the patient hydroxyurea was discontinued she was prescribed Gleevec 600 mg daily but she has only been taking 400 mg as she cannot find other milligram tablets.  She is already been pump inserted into her pelvic area.  She continues to experience nausea which resulted in her not taking the baclofen she should.  · 1/12/2023: WBC 17.64, hemoglobin 10.2, MCV 88.8,  platelets 458,000.  On Gleevec 600 mg daily and hydroxyurea 500 mg twice daily.  · 2023: WBC 10.67, hemoglobin 10.0, MCV 86.4, platelets 370,000.  Patient on Gleevec 600 mg daily and hydroxyurea 500 mg once a day.  Patient instructed at the visit to discontinue her hydroxyurea.  · 2023: WBC 17.75, hemoglobin 10.4, MCV 87.2, platelets 425,000.  On Gleevec 600 mg daily.  · 3/10/2023: 2D echocardiogram showing EF of 44% EKG showing sinus tachycardia QTc of 491    Past Medical History:   Diagnosis Date   • Bone pain    • COVID-19 virus infection 2022   • Diabetes mellitus (HCC)    • Extremity pain     Carlin. legs pain   • Leg pain     left leg greater   • Migraine    • Pulmonary embolism (HCC)    • Vision loss     doing surgery       Past Surgical History:   Procedure Laterality Date   • BONE MARROW BIOPSY     • BREAST SURGERY     • BRONCHOSCOPY N/A 2022    Procedure: BRONCHOSCOPY bilateral lung washing;  Surgeon: Charlene Camara MD;  Location: [facility] ENDOSCOPY;  Service: Pulmonary;  Laterality: N/A;  post: rule out infection vs transfusion lung injury   •  SECTION     • CHOLECYSTECTOMY     • EYE SURGERY      laser surgery due  to hemmorage--- 2021-- another surgery  lt eye11/15/21   • RETINAL DETACHMENT SURGERY     • SPINE SURGERY      Lombardi spinal block   • TUBAL ABDOMINAL LIGATION           Current Outpatient Medications:   •  acetaminophen (TYLENOL) 325 MG tablet, 2 tablets., Disp: , Rfl:   •  allopurinol (ZYLOPRIM) 100 MG tablet, Take 4 tablets by mouth 2 (Two) Times a Day., Disp: 240 tablet, Rfl: 0  •  allopurinol (Zyloprim) 300 MG tablet, Take 1 tablet by mouth Daily., Disp: 30 tablet, Rfl: 2  •  ALPRAZolam (Xanax) 0.5 MG tablet, Take 1 tablet by mouth At Night As Needed for Anxiety., Disp: 30 tablet, Rfl: 0  •  budesonide-formoterol (SYMBICORT) 160-4.5 MCG/ACT inhaler, Inhale 2 puffs 2 (Two) Times a Day., Disp: 10.2 g, Rfl: 1  •  citalopram (CeleXA) 10 MG tablet, , Disp: , Rfl:   •   Continuous Blood Gluc Sensor (FreeStyle Zahira 2 Sensor) misc, , Disp: , Rfl:   •  dapagliflozin Propanediol (Farxiga) 10 MG tablet, Take 10 mg by mouth Daily., Disp: 90 tablet, Rfl: 3  •  furosemide (LASIX) 20 MG tablet, Take 1 tablet by mouth Daily., Disp: , Rfl:   •  furosemide (LASIX) 40 MG tablet, Take 1 tablet by mouth 2 (Two) Times a Day for 14 days., Disp: 28 tablet, Rfl: 3  •  hydrOXYzine (ATARAX) 25 MG tablet, Take 1 tablet by mouth Every 8 (Eight) Hours As Needed., Disp: , Rfl:   •  Insulin Glargine (BASAGLAR KWIKPEN) 100 UNIT/ML injection pen, Inject 20 Units under the skin into the appropriate area as directed Daily., Disp: 10 mL, Rfl: 3  •  Insulin Lispro (ADMELOG SOLOSTAR) 100 UNIT/ML injection pen, Inject 6 Units under the skin into the appropriate area as directed 3 (Three) Times a Day., Disp: 3 mL, Rfl: 3  •  losartan (COZAAR) 25 MG tablet, Take 1 tablet by mouth Daily., Disp: 90 tablet, Rfl: 3  •  metoprolol succinate XL (TOPROL-XL) 25 MG 24 hr tablet, Take 1 tablet by mouth Daily., Disp: 90 tablet, Rfl: 3  •  mirtazapine (REMERON) 15 MG tablet, Take 1 tablet by mouth every night at bedtime., Disp: , Rfl:   •  ondansetron (ZOFRAN) 8 MG tablet, Take 1 tablet by mouth 3 (Three) Times a Day As Needed for Nausea or Vomiting., Disp: 30 tablet, Rfl: 5  •  oxymetazoline (AFRIN) 0.05 % nasal spray,  2 Spray, Nostrils Both, Spray, BID, 0 Refill(s), Disp: , Rfl:   •  PONATinib (ICLUSIG) 45 MG chemo tablet, Take 1 tablet by mouth Daily. Take with or without food.  Swallow tablets whole, do not chew, crush, or dissovle, Disp: 30 tablet, Rfl: 2  •  potassium chloride (K-DUR,KLOR-CON) 20 MEQ CR tablet, Take 1 tablet by mouth Daily., Disp: , Rfl:   •  potassium chloride (K-TAB) 20 MEQ tablet controlled-release ER tablet, Take 1 tablet by mouth Daily., Disp: 90 tablet, Rfl: 3  •  prochlorperazine (COMPAZINE) 10 MG tablet, Take 1 tablet by mouth Every 6 (Six) Hours As Needed for Nausea or Vomiting., Disp: 30  tablet, Rfl: 1  •  promethazine (PHENERGAN) 25 MG tablet, , Disp: , Rfl:   •  traZODone (DESYREL) 50 MG tablet, Take 0.5 tablets by mouth Every Night., Disp: 30 tablet, Rfl: 2  •  hydroxyurea (Hydrea) 500 MG capsule, Take 2 capsules by mouth 2 (Two) Times a Day., Disp: 120 capsule, Rfl: 1    No Known Allergies    Family History   Problem Relation Age of Onset   • Diabetes Mother    • Diabetes Maternal Grandmother    • Heart attack Maternal Grandmother    • Stroke Maternal Grandmother        Cancer-related family history is not on file.    Social History     Tobacco Use   • Smoking status: Never   • Smokeless tobacco: Never   Vaping Use   • Vaping Use: Never used   Substance Use Topics   • Alcohol use: No   • Drug use: No     I have reviewed and confirmed the accuracy of the patient's history: Chief complaint, HPI, ROS and Subjective as entered by the MA/LPN/RN. Denisha Gill, APRN 03/23/23     SUBJECTIVE:    Patient is here today for labs and follow-up after initiation of ponatinib.  She does state that she has had some headache and body aches from the medication.  She also still feels like her diuretics are not working well for her swelling.    REVIEW OF SYSTEMS:    Review of Systems   Constitutional: Negative for chills and fever.   HENT: Negative for ear pain, mouth sores, nosebleeds and sore throat.    Eyes: Negative for photophobia and visual disturbance.   Respiratory: Negative for wheezing and stridor.    Cardiovascular: Negative for chest pain and palpitations.   Gastrointestinal: Negative for abdominal pain, diarrhea, nausea and vomiting.   Endocrine: Negative for cold intolerance and heat intolerance.   Genitourinary: Negative for dysuria and hematuria.   Musculoskeletal: Negative for joint swelling and neck stiffness.   Skin: Negative for color change and rash.   Neurological: Negative for seizures and syncope.   Hematological: Negative for adenopathy.        No obvious bleeding  "  Psychiatric/Behavioral: Negative for agitation, confusion and hallucinations. Positive for insomnia, anxiety, depression.      OBJECTIVE:    Vitals:    03/23/23 1434   BP: 145/95   Pulse: 107   Temp: 98.1 °F (36.7 °C)   TempSrc: Oral   SpO2: 97%   Weight: 71.8 kg (158 lb 6.4 oz)   Height: 162.6 cm (64\")   PainSc: 10-Worst pain ever   PainLoc: Generalized  Comment: back and legs     Body mass index is 27.19 kg/m².    ECOG  (1) Restricted in physically strenuous activity, ambulatory and able to do work of light nature    Physical Exam  Vitals and nursing note reviewed.   Constitutional:       General: She is not in acute distress.     Appearance: She is not diaphoretic.   HENT:      Head: Normocephalic and atraumatic.   Eyes:      General: No scleral icterus.        Right eye: No discharge.         Left eye: No discharge.      Conjunctiva/sclera: Conjunctivae normal.   Neck:      Thyroid: No thyromegaly.   Cardiovascular:      Rate and Rhythm: Normal rate and regular rhythm.      Heart sounds: Normal heart sounds. No friction rub. No gallop.    Pulmonary:      Effort: Pulmonary effort is normal. No respiratory distress.      Breath sounds: No stridor. No wheezing.   Abdominal:      General: Bowel sounds are normal.      Palpations: Abdomen is soft. There is no mass.      Tenderness: There is no abdominal tenderness. There is no guarding or rebound.   Musculoskeletal:         General: No tenderness. Normal range of motion.      Cervical back: Normal range of motion and neck supple.   Lymphadenopathy:      Cervical: No cervical adenopathy.   Skin:     General: Skin is warm.      Findings: No erythema or rash.   Neurological:      Mental Status: She is alert and oriented to person, place, and time.      Motor: No abnormal muscle tone.   Psychiatric:         Behavior: Behavior anxious and tearful    I have reexamined the patient and the results are consistent with the previously documented exam. Denisha Gill, " APRN       RECENT LABS    WBC   Date Value Ref Range Status   03/23/2023 13.76 (H) 3.40 - 10.80 10*3/mm3 Final     RBC   Date Value Ref Range Status   03/23/2023 3.49 (L) 3.77 - 5.28 10*6/mm3 Final     Hemoglobin   Date Value Ref Range Status   03/23/2023 9.2 (L) 12.0 - 15.9 g/dL Final     Hematocrit   Date Value Ref Range Status   03/23/2023 29.9 (L) 34.0 - 46.6 % Final     MCV   Date Value Ref Range Status   03/23/2023 85.7 79.0 - 97.0 fL Final     MCH   Date Value Ref Range Status   03/23/2023 26.4 (L) 26.6 - 33.0 pg Final     MCHC   Date Value Ref Range Status   03/23/2023 30.8 (L) 31.5 - 35.7 g/dL Final     RDW   Date Value Ref Range Status   03/23/2023 19.6 (H) 12.3 - 15.4 % Final     RDW-SD   Date Value Ref Range Status   03/23/2023 58.9 (H) 37.0 - 54.0 fl Final     MPV   Date Value Ref Range Status   03/23/2023 9.9 6.0 - 12.0 fL Final     Platelets   Date Value Ref Range Status   03/23/2023 1,017 (C) 140 - 450 10*3/mm3 Final     Neutrophil %   Date Value Ref Range Status   03/15/2023 74.9 42.7 - 76.0 % Final     Lymphocyte %   Date Value Ref Range Status   03/23/2023 24.3 19.6 - 45.3 % Final     Monocyte %   Date Value Ref Range Status   03/23/2023 7.3 5.0 - 12.0 % Final     Eosinophil %   Date Value Ref Range Status   03/23/2023 1.5 0.3 - 6.2 % Final     Basophil %   Date Value Ref Range Status   03/15/2023 1.0 0.0 - 1.5 % Final     External Neutrophils Abs   Date Value Ref Range Status   11/30/2022 7.6 (H) 1.7 - 6.0 x10(3)/ul Final     Neutrophils, Absolute   Date Value Ref Range Status   03/15/2023 5.97 1.70 - 7.00 10*3/mm3 Final     Lymphocytes, Absolute   Date Value Ref Range Status   03/23/2023 3.35 (H) 0.70 - 3.10 10*3/mm3 Final     Monocytes, Absolute   Date Value Ref Range Status   03/23/2023 1.00 (H) 0.10 - 0.90 10*3/mm3 Final     Eosinophils, Absolute   Date Value Ref Range Status   03/23/2023 0.21 0.00 - 0.40 10*3/mm3 Final     Basophils, Absolute   Date Value Ref Range Status   03/15/2023 0.08  0.00 - 0.20 10*3/mm3 Final     nRBC   Date Value Ref Range Status   11/11/2022 2.0 (H) 0.0 - 0.2 /100 WBC Final   10/16/2019 0.2 0.0 - 0.2 /100 WBC Final       Lab Results   Component Value Date    GLUCOSE 275 (H) 03/23/2023    BUN 34 (H) 03/23/2023    CREATININE 1.00 03/23/2023    EGFRIFNONA 133 02/02/2022    BCR 34.0 (H) 03/23/2023    K 4.9 03/23/2023    CO2 25.0 03/23/2023    CALCIUM 8.7 03/23/2023    ALBUMIN 4.1 03/23/2023    LABIL2 1.0 04/18/2019    AST 22 03/23/2023    ALT 18 03/23/2023           ASSESSMENT    · Accelerated phase CML, status post bone marrow biopsy on 11/3/2022.  Currently on Gleevec 600 mg daily.  This has not controlled her malignancy well for that reason we will switch patient to Ponatinib at 5 mg p.o. daily.  Reviewed the benefits, the side effects in detail  · She will now discontinue Gleevec and decrease hydroxyurea to 500 mg twice a day  · BCR ABL kinase mutation assay was negative  · Cardiomyopathy her most recent echo shows an EF of 44% which is an improvement from 26 to 30%.  She will continue to follow-up with her cardiologist  · Pain management, status post pain pump insertion.  She will follow-up with pain management at [facility] or [facility].  Her pain management seems to be adequate  · Anxiety: We will go ahead and refill Xanax  · CML in chronic phase with no significant hematologic or molecular or cytogenetic response on   Imatinib.  ABL kinase mutational analysis was negative.  We  have represcribed to Tasigna 300 mg twice a day.  Patient has been noncompliant with treatments and ended up in the hospital with hyperleukocytosis, peripheral blood blasts.  Bone marrow biopsy suggest that she remains in chronic phase.  Continue to Tasiigna at the current doses.  Hydroxyurea has been discontinued.  · Uncontrolled diabetes type 1, followed at the [facility] by Dr. Calles  · Chronic anemia: Stable, multifactorial from CML, to Tasigna, hydroxyurea.   This has improved  · Insomnia  · Situational anxiety, not suicidal.  Continue Xanax 0.5 mg once daily.  · Hyperleukocytosis secondary to CML  · History of medical noncompliance  · Pulmonary embolism: Xarelto restarted  · Recent COVID-19 pneumonia  · History of prolonged QTC        Plans    · CBC reviewed.  Platelets over 1 million  · Discontinued Gleevac  · Continue ponatinib.  TSH, uric acid, lipase, CMP today for monitoring.  · Change hydroxyurea back to 1000 mg twice a day due to thrombocytosis  · Ponatinib 45 mg p.o. daily.  She was given information on this drug to review  · BCR ABL kinase mutational analysis was negative  · Request [facility] records from [facility]  · Informed patient that she will be seen on a weekly basis for now  · DC trazodone 25 mg due to QT prolongation  · Monitor electrolytes closely  · Encouraged her to follow-up with cardiology in regards to dosing of her diuretics.  · EKG reviewed.  She has slight prolongation of QT.  We will discontinue Zofran and Phenergan and use Compazine as needed for nausea.  Prescription has been sent to her pharmacy  • GI consult for continued nausea and vomiting and reported dark emesis.  She had EGD and colonoscopy coming up but they are requesting $400 upfront which she does not have.  I have asked  to investigate if there is an affordable option.  • Due to worsening anxiety increase Xanax to 0.5 mg p.o. nightly as needed number 30 pills.  Continue this for now.    • All questions answered  • Continue to follow-up with PCP per routine  • Follow-up with pain management per routine  • Follow-up with nurse practitioner early next week  • EKG today  • Follow-up with Dr. Lerma as previously scheduled in 2 weeks  • Follow-up with NP in 3 weeks        I spent 30 total minutes, face-to-face, caring for Hope today.  90% of this time involved counseling and/or coordination of care as documented within this note.

## 2023-03-23 ENCOUNTER — LAB (OUTPATIENT)
Dept: LAB | Facility: HOSPITAL | Age: 48
End: 2023-03-23
Payer: MEDICARE

## 2023-03-23 ENCOUNTER — OFFICE VISIT (OUTPATIENT)
Dept: ONCOLOGY | Facility: CLINIC | Age: 48
End: 2023-03-23
Payer: MEDICARE

## 2023-03-23 ENCOUNTER — SPECIALTY PHARMACY (OUTPATIENT)
Dept: PHARMACY | Facility: HOSPITAL | Age: 48
End: 2023-03-23
Payer: MEDICARE

## 2023-03-23 VITALS
OXYGEN SATURATION: 97 % | HEIGHT: 64 IN | BODY MASS INDEX: 27.04 KG/M2 | TEMPERATURE: 98.1 F | WEIGHT: 158.4 LBS | HEART RATE: 107 BPM | DIASTOLIC BLOOD PRESSURE: 95 MMHG | SYSTOLIC BLOOD PRESSURE: 145 MMHG

## 2023-03-23 DIAGNOSIS — E87.5 HYPERKALEMIA: ICD-10-CM

## 2023-03-23 DIAGNOSIS — C92.10 CML (CHRONIC MYELOCYTIC LEUKEMIA): Primary | ICD-10-CM

## 2023-03-23 DIAGNOSIS — C92.10 CML (CHRONIC MYELOCYTIC LEUKEMIA): Primary | Chronic | ICD-10-CM

## 2023-03-23 DIAGNOSIS — C92.10 BLAST CRISIS PHASE OF CHRONIC MYELOID LEUKEMIA: ICD-10-CM

## 2023-03-23 LAB
ALBUMIN SERPL-MCNC: 4.1 G/DL (ref 3.5–5.2)
ALBUMIN/GLOB SERPL: 1.2 G/DL
ALP SERPL-CCNC: 1071 U/L (ref 39–117)
ALT SERPL W P-5'-P-CCNC: 18 U/L (ref 1–33)
ANION GAP SERPL CALCULATED.3IONS-SCNC: 14 MMOL/L (ref 5–15)
AST SERPL-CCNC: 22 U/L (ref 1–32)
BASOPHILS NFR BLD AUTO: ABNORMAL %
BILIRUB SERPL-MCNC: 3.1 MG/DL (ref 0–1.2)
BUN SERPL-MCNC: 34 MG/DL (ref 6–20)
BUN/CREAT SERPL: 34 (ref 7–25)
CALCIUM SPEC-SCNC: 8.7 MG/DL (ref 8.6–10.5)
CHLORIDE SERPL-SCNC: 102 MMOL/L (ref 98–107)
CO2 SERPL-SCNC: 25 MMOL/L (ref 22–29)
CREAT SERPL-MCNC: 1 MG/DL (ref 0.57–1)
DEPRECATED RDW RBC AUTO: 58.9 FL (ref 37–54)
EGFRCR SERPLBLD CKD-EPI 2021: 70.1 ML/MIN/1.73
EOSINOPHIL # BLD AUTO: 0.21 10*3/MM3 (ref 0–0.4)
EOSINOPHIL NFR BLD AUTO: 1.5 % (ref 0.3–6.2)
ERYTHROCYTE [DISTWIDTH] IN BLOOD BY AUTOMATED COUNT: 19.6 % (ref 12.3–15.4)
GLOBULIN UR ELPH-MCNC: 3.5 GM/DL
GLUCOSE SERPL-MCNC: 275 MG/DL (ref 65–99)
HCT VFR BLD AUTO: 29.9 % (ref 34–46.6)
HGB BLD-MCNC: 9.2 G/DL (ref 12–15.9)
HOLD SPECIMEN: NORMAL
LIPASE SERPL-CCNC: 11 U/L (ref 13–60)
LYMPHOCYTES # BLD AUTO: 3.35 10*3/MM3 (ref 0.7–3.1)
LYMPHOCYTES NFR BLD AUTO: 24.3 % (ref 19.6–45.3)
MCH RBC QN AUTO: 26.4 PG (ref 26.6–33)
MCHC RBC AUTO-ENTMCNC: 30.8 G/DL (ref 31.5–35.7)
MCV RBC AUTO: 85.7 FL (ref 79–97)
MONOCYTES # BLD AUTO: 1 10*3/MM3 (ref 0.1–0.9)
MONOCYTES NFR BLD AUTO: 7.3 % (ref 5–12)
NEUTROPHILS NFR BLD AUTO: ABNORMAL %
PLATELET # BLD AUTO: 1017 10*3/MM3 (ref 140–450)
PMV BLD AUTO: 9.9 FL (ref 6–12)
POTASSIUM SERPL-SCNC: 4.9 MMOL/L (ref 3.5–5.2)
PROT SERPL-MCNC: 7.6 G/DL (ref 6–8.5)
RBC # BLD AUTO: 3.49 10*6/MM3 (ref 3.77–5.28)
SODIUM SERPL-SCNC: 141 MMOL/L (ref 136–145)
TSH SERPL DL<=0.05 MIU/L-ACNC: 2.04 UIU/ML (ref 0.27–4.2)
URATE SERPL-MCNC: 16 MG/DL (ref 2.4–5.7)
WBC NRBC COR # BLD: 13.76 10*3/MM3 (ref 3.4–10.8)

## 2023-03-23 PROCEDURE — 84443 ASSAY THYROID STIM HORMONE: CPT | Performed by: INTERNAL MEDICINE

## 2023-03-23 PROCEDURE — 83690 ASSAY OF LIPASE: CPT | Performed by: INTERNAL MEDICINE

## 2023-03-23 PROCEDURE — 1159F MED LIST DOCD IN RCRD: CPT | Performed by: NURSE PRACTITIONER

## 2023-03-23 PROCEDURE — 1125F AMNT PAIN NOTED PAIN PRSNT: CPT | Performed by: NURSE PRACTITIONER

## 2023-03-23 PROCEDURE — 99214 OFFICE O/P EST MOD 30 MIN: CPT | Performed by: NURSE PRACTITIONER

## 2023-03-23 PROCEDURE — 1160F RVW MEDS BY RX/DR IN RCRD: CPT | Performed by: NURSE PRACTITIONER

## 2023-03-23 PROCEDURE — 36415 COLL VENOUS BLD VENIPUNCTURE: CPT

## 2023-03-23 PROCEDURE — 80053 COMPREHEN METABOLIC PANEL: CPT | Performed by: INTERNAL MEDICINE

## 2023-03-23 PROCEDURE — 85025 COMPLETE CBC W/AUTO DIFF WBC: CPT

## 2023-03-23 PROCEDURE — 84550 ASSAY OF BLOOD/URIC ACID: CPT | Performed by: INTERNAL MEDICINE

## 2023-03-23 RX ORDER — CITALOPRAM 10 MG/1
TABLET ORAL
COMMUNITY
Start: 2023-03-22 | End: 2023-03-23

## 2023-03-23 RX ORDER — HYDROXYUREA 500 MG/1
1000 CAPSULE ORAL 2 TIMES DAILY
Qty: 120 CAPSULE | Refills: 1 | Status: SHIPPED | OUTPATIENT
Start: 2023-03-23

## 2023-03-23 RX ORDER — PROMETHAZINE HYDROCHLORIDE 25 MG/1
TABLET ORAL
COMMUNITY
Start: 2023-03-22 | End: 2023-03-23

## 2023-03-23 NOTE — PROGRESS NOTES
MTM Encounter-Re: Adherence and side effects (Ponatinib)    Today's encounter was conducted in person, face-to-face.     Medication:  Ponatinib 45mg PO daily  - Reason for outreach: Routine medication check-in .  - Administration: Patient is taking every day at the same time .  - Missed doses: Patient reports missing 0 doses since starting medication.   - Self-administration: Barriers to self administration/adherence identified: History of non-compliance. Methods for Supporting Patient Self-Administration/Adherence: Patient is staying with mother and mother is helping to manage medication.    - Diagnosis/Indication: CML. Progress toward achieving therapeutic goals reviewed.   - Patient is experiencing side effects of fatigue and muscle and joint pain.  She also believes medication is causing mood swings.  She reports that she also felt like this when she started imatinib and that it caused similar side effects initially and improved after time..    - Medication availability/affordability: Patient has had no issues obtaining medication from pharmacy.   - Questions/concerns about medications: Patient reports that she taking medication as prescribed.  She reports that she stopped taking the Hydrea 5 days because she ran out of medication.  This is being prescribed again for her.     Advised pt to call the clinic if any new medications are started so we can assess for drug-drug interactions.     All questions addressed. Patient had no additional concerns for MTM office.     Ros Martinez  3/23/2023  15:20 EDT

## 2023-03-24 ENCOUNTER — TELEPHONE (OUTPATIENT)
Dept: ONCOLOGY | Facility: CLINIC | Age: 48
End: 2023-03-24
Payer: MEDICARE

## 2023-03-24 ENCOUNTER — DOCUMENTATION (OUTPATIENT)
Dept: PHARMACY | Facility: HOSPITAL | Age: 48
End: 2023-03-24
Payer: MEDICARE

## 2023-03-24 DIAGNOSIS — F41.8 DEPRESSION WITH ANXIETY: Primary | ICD-10-CM

## 2023-03-24 RX ORDER — MIRTAZAPINE 15 MG/1
15 TABLET, FILM COATED ORAL
Qty: 30 TABLET | Refills: 2 | Status: SHIPPED | OUTPATIENT
Start: 2023-03-24

## 2023-03-24 NOTE — TELEPHONE ENCOUNTER
Spoke to Sayra, let her know that we can't continue the trazodone because of the QT prolongation, but we can use Mirtazapine if that is helpful.  She verbalized understanding and asked for a prescription to be sent to [facility].

## 2023-03-24 NOTE — PROGRESS NOTES
Specialty Pharmacy Note    [facility] was able to fill Hope hydrea and has it ready for  today. Called Sayra and notified her of daughter medication. She said she will pick it up today.    Jyotsna Antno - CARE COORDINATOR  3/24/2023  10:00 EDT    Called [id number] and discontinue her imatinib prescription per pharmacist orders.    Jyotsna Anton - CARE COORDINATOR  3/29/2023  16:26 EDT

## 2023-03-28 ENCOUNTER — TELEPHONE (OUTPATIENT)
Dept: ONCOLOGY | Facility: CLINIC | Age: 48
End: 2023-03-28
Payer: MEDICARE

## 2023-03-28 DIAGNOSIS — C92.10 CML (CHRONIC MYELOCYTIC LEUKEMIA): Primary | ICD-10-CM

## 2023-03-28 NOTE — TELEPHONE ENCOUNTER
----- Message from Meghann Lerma MD sent at 3/27/2023  5:50 PM EDT -----  Can we will bring her in tomorrow for CBC, CMP, LDH and uric acid STAT and have her wait in the office until get these labs back.  Preferably if she can come early in the day.  Thank you

## 2023-03-28 NOTE — TELEPHONE ENCOUNTER
Attempted to call pt's mother to see if they would be able to come in for STAT labs today. No answer, detailed message left.     Pt's mother returned my call. She said there was no way the pt could come in today for labs because the pt's daughter has a dentist appt to fix a broken tooth. I asked if they would be able to come in tomorrow. She said they would be able to come at 3. I asked if there was any way they could come in earlier and she said she would try, but she gets off work at 2. Appt scheduled.

## 2023-03-29 ENCOUNTER — SPECIALTY PHARMACY (OUTPATIENT)
Dept: PHARMACY | Facility: HOSPITAL | Age: 48
End: 2023-03-29
Payer: MEDICARE

## 2023-03-29 ENCOUNTER — LAB (OUTPATIENT)
Dept: LAB | Facility: HOSPITAL | Age: 48
End: 2023-03-29
Payer: MEDICARE

## 2023-03-29 ENCOUNTER — HOSPITAL ENCOUNTER (OUTPATIENT)
Dept: ONCOLOGY | Facility: HOSPITAL | Age: 48
Discharge: HOME | End: 2023-03-29
Payer: MEDICARE

## 2023-03-29 DIAGNOSIS — C92.10 CML (CHRONIC MYELOCYTIC LEUKEMIA): ICD-10-CM

## 2023-03-29 DIAGNOSIS — C92.10 BLAST CRISIS PHASE OF CHRONIC MYELOID LEUKEMIA: Primary | ICD-10-CM

## 2023-03-29 DIAGNOSIS — E86.0 DEHYDRATION: Primary | ICD-10-CM

## 2023-03-29 LAB
ALBUMIN SERPL-MCNC: 4.1 G/DL (ref 3.5–5.2)
ALBUMIN/GLOB SERPL: 1.1 G/DL
ALP SERPL-CCNC: 993 U/L (ref 39–117)
ALT SERPL W P-5'-P-CCNC: 13 U/L (ref 1–33)
ANION GAP SERPL CALCULATED.3IONS-SCNC: 14 MMOL/L (ref 5–15)
AST SERPL-CCNC: 15 U/L (ref 1–32)
BASOPHILS # BLD AUTO: 0.19 10*3/MM3 (ref 0–0.2)
BASOPHILS NFR BLD AUTO: 1.4 % (ref 0–1.5)
BILIRUB SERPL-MCNC: 2.2 MG/DL (ref 0–1.2)
BUN SERPL-MCNC: 20 MG/DL (ref 6–20)
BUN/CREAT SERPL: 23.5 (ref 7–25)
CALCIUM SPEC-SCNC: 9.6 MG/DL (ref 8.6–10.5)
CHLORIDE SERPL-SCNC: 101 MMOL/L (ref 98–107)
CO2 SERPL-SCNC: 25 MMOL/L (ref 22–29)
CREAT SERPL-MCNC: 0.85 MG/DL (ref 0.57–1)
DEPRECATED RDW RBC AUTO: 54.6 FL (ref 37–54)
EGFRCR SERPLBLD CKD-EPI 2021: 85.2 ML/MIN/1.73
EOSINOPHIL # BLD AUTO: 0.26 10*3/MM3 (ref 0–0.4)
EOSINOPHIL NFR BLD AUTO: 1.9 % (ref 0.3–6.2)
ERYTHROCYTE [DISTWIDTH] IN BLOOD BY AUTOMATED COUNT: 18.1 % (ref 12.3–15.4)
GLOBULIN UR ELPH-MCNC: 3.9 GM/DL
GLUCOSE SERPL-MCNC: 343 MG/DL (ref 65–99)
HCT VFR BLD AUTO: 31.2 % (ref 34–46.6)
HGB BLD-MCNC: 9.7 G/DL (ref 12–15.9)
LDH SERPL-CCNC: 391 U/L (ref 135–214)
LYMPHOCYTES # BLD AUTO: 2.16 10*3/MM3 (ref 0.7–3.1)
LYMPHOCYTES NFR BLD AUTO: 15.7 % (ref 19.6–45.3)
MCH RBC QN AUTO: 26.7 PG (ref 26.6–33)
MCHC RBC AUTO-ENTMCNC: 31.1 G/DL (ref 31.5–35.7)
MCV RBC AUTO: 86 FL (ref 79–97)
MONOCYTES # BLD AUTO: 0.39 10*3/MM3 (ref 0.1–0.9)
MONOCYTES NFR BLD AUTO: 2.8 % (ref 5–12)
NEUTROPHILS NFR BLD AUTO: 10.72 10*3/MM3 (ref 1.7–7)
NEUTROPHILS NFR BLD AUTO: 78.2 % (ref 42.7–76)
PLATELET # BLD AUTO: 896 10*3/MM3 (ref 140–450)
PMV BLD AUTO: 9.2 FL (ref 6–12)
POTASSIUM SERPL-SCNC: 4.3 MMOL/L (ref 3.5–5.2)
PROT SERPL-MCNC: 8 G/DL (ref 6–8.5)
RBC # BLD AUTO: 3.63 10*6/MM3 (ref 3.77–5.28)
SODIUM SERPL-SCNC: 140 MMOL/L (ref 136–145)
URATE SERPL-MCNC: 9.3 MG/DL (ref 2.4–5.7)
WBC NRBC COR # BLD: 13.72 10*3/MM3 (ref 3.4–10.8)

## 2023-03-29 PROCEDURE — 80053 COMPREHEN METABOLIC PANEL: CPT | Performed by: INTERNAL MEDICINE

## 2023-03-29 PROCEDURE — 85025 COMPLETE CBC W/AUTO DIFF WBC: CPT

## 2023-03-29 PROCEDURE — 96360 HYDRATION IV INFUSION INIT: CPT

## 2023-03-29 PROCEDURE — 36415 COLL VENOUS BLD VENIPUNCTURE: CPT

## 2023-03-29 PROCEDURE — 83615 LACTATE (LD) (LDH) ENZYME: CPT | Performed by: INTERNAL MEDICINE

## 2023-03-29 PROCEDURE — 84550 ASSAY OF BLOOD/URIC ACID: CPT | Performed by: INTERNAL MEDICINE

## 2023-03-29 RX ADMIN — SODIUM CHLORIDE 1000 ML: 9 INJECTION, SOLUTION INTRAVENOUS at 15:17

## 2023-03-29 NOTE — PROGRESS NOTES
Re:  Oral Specialty Medication - Iclusig (ponatinib)-Dose Adjustment    Met with Hope in clinic today to check adherence and side effects of Iclusig.  Patient was in tears due to sever pain in the form of muscle and body aches, headaches, and lack of sleep.  She reports that she hasn't slept in 5 days and is exhausted.  She also reports that her blood glucose has been elevated, she has lack of appetite and just generally feels poorly.  Her mother is present and reports that patient moved out of her house today and is moving into an apartment with her  and grandson.  Patient is wanting to stop Iclusig and start alternate treatment but after discussion, she is willing to try a dose reduction.  Discussed with Dr. Lerma and verbal orders given to decrease medication to 30mg PO daily and continue hydroxyurea 1000mg PO BID.  Per Dr. Lerma, patient can hold Iclusig for 2-3 days, but then should resume 45mg daily until reduced dose is available.  Orders also given for patient to receive IVF in clinic today.  Also discussed with patient need for repeat EKG.  She will go today or tomorrow to get completed.  • Drug-Drug Interactions: The current medication list was reviewed and there are no relevant drug-drug interactions.  • Medication Allergies: The patient has NKDA  • Review of Labs/Dose Adjustments: DOSE CHANGE - I reviewed the most recent note and labs. Due to side effects including nose bleeds, severe muscle and body aches, and headaches the dose is being decreased to 30mg by mouth daily.     Drug: Iclusig (ponatinib)  Strength: 15 mg  Directions: Take 2 tablet by mouth daily  Quantity: 60  Refills: 3  Pharmacy prescription sent to: [facility] upon MD signature    Ros Martinez, PharmD      3/29/2023  15:18 EDT       Completed independent double check on medication order/RX.  Shauna Forrest, RPmichelle, BCOP

## 2023-03-29 NOTE — PROGRESS NOTES
"Pt sent back for IVF's per Dr. Lerma while pt waits on her STAT labs.  PIV started w/ positive blood return.  IVF's given.  Per Dr. Lerma, pt's labs have improved and she is okay to go after IVF\"s are complete.  Pt to follow up w/ Dr. Lerma on 4/6/23.  Pt v/u.  IV removed and pt discharged from clinic w/ mom.  "

## 2023-03-30 DIAGNOSIS — C92.10 CML (CHRONIC MYELOCYTIC LEUKEMIA): ICD-10-CM

## 2023-03-30 DIAGNOSIS — C92.10 BLAST CRISIS PHASE OF CHRONIC MYELOID LEUKEMIA: Primary | ICD-10-CM

## 2023-03-30 LAB
INTERPRETATION: POSITIVE
LAB DIRECTOR NAME PROVIDER: NORMAL
LABORATORY COMMENT REPORT: NORMAL
REF LAB TEST METHOD: NORMAL
T(ABL1,BCR)B2A2/CONTROL BLD/T: 24.54 %
T(ABL1,BCR)B3A2/CONTROL BLD/T: NORMAL %
T(ABL1,BCR)E1A2/CONTROL BLD/T: 0.4 %

## 2023-03-31 NOTE — PROGRESS NOTES
Re: Refills of Oral Specialty Medication - Iclusig (ponatinib) - Dose change/double check    Drug: Iclusig (ponatinib)  Strength: 15 mg  Directions: Take 2 tablets (30 mg) by mouth daily  Quantity: 60  Refills: 3  Pharmacy prescription sent to: [facility]     Amy Aldridge Pharm.D.    3/31/2023  14:27 EDT

## 2023-04-04 NOTE — PROGRESS NOTES
Hematology/Oncology Outpatient Follow Up    PATIENT NAME:Nieves Mc  :1975  MRN: [id number]  PRIMARY CARE PHYSICIAN: Alida Latham APRN  REFERRING PHYSICIAN: Alida Latham, *    Chief Complaint   Patient presents with   • Follow-up     Follow up  Lab  CML (chronic myelocytic leukemia)-----TRAVEL SCREEN NEG [facility] HUB 23  **needs appt for GSI        HISTORY OF PRESENT ILLNESS:      Patient is a 47 y.o. female with PMH significant for CML on treatment with Imatinib.  Patient stated that she typically feels poorly with Imatinib with frequent nausea and vomiting.  Also complained of weakness and fatigue.  Patient presented to ED on 10/22/18 with complaints of an elevated blood sugar around 400.  Stated she felt poorly and has had a productive cough with yellow sputum.  Complained of nausea and stated she was unable to keep any food down anytime she ate.  The patient reported subjective fever without chills.  She stated she had been coughing so hard that she was vomiting.  She denied hematemesis.  Denied diarrhea, constipation or blood in her stool.       Patient’s chest x-ray showed no evidence of acute pulmonary embolus.  The patient has ground-glass opacities throughout the lungs.  There is some air trapping noted which would appear to be   infectious.  Patient was started on antibiotics.      Hem/Onc was consulted on 10/23/18 for co-management of patient with CML.  Patient was seen by Dr. Lerma on 10/12 on previous admission for patient reported CML on Imatinib (Gleevec).  Patient reports she is under the care of Dr. Roach at [facility] in [place].  Patient was seen by Dr. Lerma in May 2018 when patient presented with hematuria, which was attributed to her Imatinib.  Dr. Lerma followed during hospitalization but then patient returned to Dr. Roach for her usual follow up.  She was again seen on 10/12.  Patient is stating she will switch to Dr. Lerma.    · Review of  Dr. Roach’s note:  On 4/9/18 patient had BCR-abl which was 61.326.  Patient had been on Imatinib 400 mg daily.  She had presented in March 2018 with WBC of 24, hemoglobin 13.1, platelet count of 1,067,000.  Patient apparently had follow up BCR-abl in August 2018, results are not available for review.  Her bone marrow aspiration and biopsy was also performed at that time is currently not available for review.    · 11/6/18 - .  Uric acid 6.5.  BCR-abl by RT-PCR was measured at 3.36%.    · Due to thrombocytosis, patient was placed on Hydroxyurea 500 mg twice a day on 12/11/18.  Platelet count juana to 900,000.  Patient was noncompliant with CBCs that were recommended.    Patient was asked to return to the office after not being able to reach her.   · 12/27/18 - Bone marrow aspiration and biopsy was consistent with chronic myeloid leukemia.  BCR-abl positive, chronic phase.  ABL kinase mutational analysis is currently pending on the bone marrow.    · 1/3/19 - Repeat CBC, WBC 17, hemoglobin 11.9, platelet count 1,072,000.  Hydroxyurea was increased to 1 gm twice a day with follow up CBC.    · 1/6/19 - Follow up CBC showed progressive increase in platelet to 1.1 million.  Patient was offered admission to the hospital, which she declined.  Hydroxyurea was increased to 1 gm three   times a day as of 1/6/19.   · 1/7/19 - EKG shows sinus tachycardia.   milliseconds.   ·  ABL kinase on peripheral blood was ordered on 1/11/19.  Based on sequence analysis, there was no mutation detected.    · 2/12/19 - Patient was initiated on Tasigna 300 mg twice a day.  · 2/13/19 - Urinalysis was negative for hematuria.   · 2/22/19 -  milliseconds.  · 3/13/19 - EKG with QTC is 413 milliseconds.  Nonspecific changes noted.    · 4/18/19 - EKG showed QTC prolonged to 453 milliseconds.  This is changed from prior.  · 4/18/19 - Free T4 normal at 0.91.  Magnesium was 1.7.  BUN is 6.  Creatinine 0.7.  Bilirubin 2.2.   Phosphorous normal 3.7.  TSH 0.43, normal.  Free T3 was normal at 3.47.  Ionized calcium   normal at 1.23.  BCR-abl was 0.522%.    · 5/7/19 - EKG showed QTC of 441 milliseconds.  QT was 366.     · Patient has been lost to follow-up since 2019 for CML.  Patient states that she lost her insurance.  She also does not have any primary care physician at this time.  She is diabetic on insulin.  · Patient was recently admitted to the hospital for pneumonia due to COVID-19 infection.  At that time patient made a decision to become compliant with treatment.  She is currently on to [employer]a 300 mg p.o. twice a day.  She is also on hydroxyurea 1 g twice a day.  Overall she feels better, she has more energy.  · 3/1/2022 white count is 53.92, hemoglobin 11.7 and platelets are 472    · 6/1/2022: Patient has not been seen since March 2022.  Also verified that she has not been taking to Tasigna since February 2022.  She comes in today with cough for 1 and half months, shortness of breath, denies fevers.  · 6/30/2022 patient had 2D echocardiogram which showed an EF of 41 to 45%.  Left ventricular cavity is mildly dilated.  She was diagnosed with nonischemic cardiomyopathy    · 11/18/2022: Patient was transferred from [facility] to [facility] due to cardiac failure.  She was then seen by NP [facility] hematology department.  Patient underwent tumor aspiration and biopsy at that time did not show chronic myeloid leukemia in accelerated phase markedly hypercellular marrow with megakaryocytic and myeloid hyperplasia with atypia and increased basophils blasts are not increased less than 1% moderate reticulin fibrosis.  According to the patient hydroxyurea was discontinued she was prescribed Gleevec 600 mg daily but she has only been taking 400 mg as she cannot find other milligram tablets.  She is already been pump inserted into her pelvic area.  She continues to experience nausea which resulted in her not taking the  baclofen she should.  · 2023: WBC 17.64, hemoglobin 10.2, MCV 88.8, platelets 458,000.  On Gleevec 600 mg daily and hydroxyurea 500 mg twice daily.  · 2023: WBC 10.67, hemoglobin 10.0, MCV 86.4, platelets 370,000.  Patient on Gleevec 600 mg daily and hydroxyurea 500 mg once a day.  Patient instructed at the visit to discontinue her hydroxyurea.  · 2023: WBC 17.75, hemoglobin 10.4, MCV 87.2, platelets 425,000.  On Gleevec 600 mg daily.  · 3/10/2023: 2D echocardiogram showing EF of 44% EKG showing sinus tachycardia QTc of 491    Past Medical History:   Diagnosis Date   • Bone pain    • COVID-19 virus infection 2022   • Diabetes mellitus    • Extremity pain     Carlin. legs pain   • Leg pain     left leg greater   • Migraine    • Pulmonary embolism    • Vision loss     doing surgery       Past Surgical History:   Procedure Laterality Date   • BONE MARROW BIOPSY     • BREAST SURGERY     • BRONCHOSCOPY N/A 2022    Procedure: BRONCHOSCOPY bilateral lung washing;  Surgeon: Charlene Camara MD;  Location: [facility] ENDOSCOPY;  Service: Pulmonary;  Laterality: N/A;  post: rule out infection vs transfusion lung injury   •  SECTION     • CHOLECYSTECTOMY     • EYE SURGERY      laser surgery due  to hemmorage--- 2021-- another surgery  lt eye11/15/21   • RETINAL DETACHMENT SURGERY     • SPINE SURGERY      Lombardi spinal block   • TUBAL ABDOMINAL LIGATION           Current Outpatient Medications:   •  acetaminophen (TYLENOL) 325 MG tablet, 2 tablets., Disp: , Rfl:   •  allopurinol (ZYLOPRIM) 100 MG tablet, Take 4 tablets by mouth 2 (Two) Times a Day., Disp: 240 tablet, Rfl: 0  •  allopurinol (Zyloprim) 300 MG tablet, Take 1 tablet by mouth Daily., Disp: 30 tablet, Rfl: 2  •  ALPRAZolam (Xanax) 0.5 MG tablet, Take 1 tablet by mouth At Night As Needed for Anxiety., Disp: 30 tablet, Rfl: 0  •  budesonide-formoterol (SYMBICORT) 160-4.5 MCG/ACT inhaler, Inhale 2 puffs 2 (Two) Times a Day., Disp: 10.2 g,  Rfl: 1  •  Continuous Blood Gluc Sensor (FreeStyle Zahira 2 Sensor) misc, , Disp: , Rfl:   •  dapagliflozin Propanediol (Farxiga) 10 MG tablet, Take 10 mg by mouth Daily., Disp: 90 tablet, Rfl: 3  •  furosemide (LASIX) 20 MG tablet, Take 1 tablet by mouth Daily., Disp: , Rfl:   •  hydroxyurea (Hydrea) 500 MG capsule, Take 2 capsules by mouth 2 (Two) Times a Day., Disp: 120 capsule, Rfl: 1  •  hydrOXYzine (ATARAX) 25 MG tablet, Take 1 tablet by mouth Every 8 (Eight) Hours As Needed., Disp: , Rfl:   •  Insulin Glargine (BASAGLAR KWIKPEN) 100 UNIT/ML injection pen, Inject 20 Units under the skin into the appropriate area as directed Daily., Disp: 10 mL, Rfl: 3  •  Insulin Lispro (ADMELOG SOLOSTAR) 100 UNIT/ML injection pen, Inject 6 Units under the skin into the appropriate area as directed 3 (Three) Times a Day., Disp: 3 mL, Rfl: 3  •  losartan (COZAAR) 25 MG tablet, Take 1 tablet by mouth Daily., Disp: 90 tablet, Rfl: 3  •  metoprolol succinate XL (TOPROL-XL) 25 MG 24 hr tablet, Take 1 tablet by mouth Daily., Disp: 90 tablet, Rfl: 3  •  mirtazapine (REMERON) 15 MG tablet, Take 1 tablet by mouth every night at bedtime., Disp: 30 tablet, Rfl: 2  •  oxymetazoline (AFRIN) 0.05 % nasal spray,  2 Spray, Nostrils Both, Spray, BID, 0 Refill(s), Disp: , Rfl:   •  PONATinib (ICLUSIG) 15 MG chemo tablet, Take 2 tablets by mouth Daily. Take with or without food.  Swallow tablets whole, do not chew, crush, or dissovle, Disp: 60 tablet, Rfl: 3  •  potassium chloride (K-DUR,KLOR-CON) 20 MEQ CR tablet, Take 1 tablet by mouth Daily., Disp: , Rfl:   •  potassium chloride (K-TAB) 20 MEQ tablet controlled-release ER tablet, Take 1 tablet by mouth Daily., Disp: 90 tablet, Rfl: 3  •  prochlorperazine (COMPAZINE) 10 MG tablet, Take 1 tablet by mouth Every 6 (Six) Hours As Needed for Nausea or Vomiting., Disp: 30 tablet, Rfl: 1  •  furosemide (LASIX) 40 MG tablet, Take 1 tablet by mouth 2 (Two) Times a Day for 14 days., Disp: 28 tablet, Rfl:  3    No Known Allergies    Family History   Problem Relation Age of Onset   • Diabetes Mother    • Diabetes Maternal Grandmother    • Heart attack Maternal Grandmother    • Stroke Maternal Grandmother        Cancer-related family history is not on file.    Social History     Tobacco Use   • Smoking status: Never   • Smokeless tobacco: Never   Vaping Use   • Vaping Use: Never used   Substance Use Topics   • Alcohol use: No   • Drug use: No     I have reviewed and confirmed the accuracy of the patient's history: Chief complaint, HPI, ROS and Subjective as entered by the MA/LPN/RN. Denisha Gill, APRN 04/06/23     SUBJECTIVE:  Patient here today accompanied by her mother for her routine follow-up.  She started ponatinib and has had significant pain that has not been well managed by her Dilaudid pump or new muscle relaxer.  Due to this her ponatinib is being decreased from 45 mg to 30 mg but the new dose has only just arrived and she will begin taking it tomorrow.  She reports that she had her EKG completed today.  She states that her pain management physician has increased her Dilaudid pump doses and states that there is not anything else that he can do for her if this does not help.    REVIEW OF SYSTEMS:    Review of Systems   Constitutional: Negative for chills and fever.   HENT: Negative for ear pain, mouth sores, nosebleeds and sore throat.    Eyes: Negative for photophobia and visual disturbance.   Respiratory: Negative for wheezing and stridor.    Cardiovascular: Negative for chest pain and palpitations.   Gastrointestinal: Negative for abdominal pain, diarrhea, nausea and vomiting.   Endocrine: Negative for cold intolerance and heat intolerance.   Genitourinary: Negative for dysuria and hematuria.   Musculoskeletal: Negative for joint swelling and neck stiffness.  Positive for back and leg pain.   Skin: Negative for color change and rash.   Neurological: Negative for seizures and syncope.   Hematological:  "Negative for adenopathy.        No obvious bleeding   Psychiatric/Behavioral: Negative for agitation, confusion and hallucinations. Positive for insomnia, anxiety, depression.      OBJECTIVE:    Vitals:    04/06/23 1451   BP: 145/87   Pulse: 88   Temp: 95.1 °F (35.1 °C)   TempSrc: Oral   SpO2: 100%   Weight: 63.4 kg (139 lb 12.8 oz)   Height: 162.6 cm (64\")   PainSc: 10-Worst pain ever   PainLoc: Back  Comment: legs     Body mass index is 24 kg/m².    ECOG  (1) Restricted in physically strenuous activity, ambulatory and able to do work of light nature    Physical Exam  Vitals and nursing note reviewed.   Constitutional:       General: She is not in acute distress.     Appearance: She is not diaphoretic.   HENT:      Head: Normocephalic and atraumatic.   Eyes:      General: No scleral icterus.        Right eye: No discharge.         Left eye: No discharge.      Conjunctiva/sclera: Conjunctivae normal.   Neck:      Thyroid: No thyromegaly.   Cardiovascular:      Rate and Rhythm: Normal rate and regular rhythm.      Heart sounds: Normal heart sounds. No friction rub. No gallop.    Pulmonary:      Effort: Pulmonary effort is normal. No respiratory distress.      Breath sounds: No stridor. No wheezing.   Abdominal:      General: Bowel sounds are normal.      Palpations: Abdomen is soft. There is no mass.      Tenderness: There is no abdominal tenderness. There is no guarding or rebound.   Musculoskeletal:         General: No tenderness. Normal range of motion.      Cervical back: Normal range of motion and neck supple.   Lymphadenopathy:      Cervical: No cervical adenopathy.   Skin:     General: Skin is warm.      Findings: No erythema or rash.   Neurological:      Mental Status: She is alert and oriented to person, place, and time.      Motor: No abnormal muscle tone.   Psychiatric:         Behavior: Behavior anxious and tearful    I have reexamined the patient and the results are consistent with the previously " documented exam. JP Varela       RECENT LABS    WBC   Date Value Ref Range Status   04/06/2023 8.80 3.40 - 10.80 10*3/mm3 Final     RBC   Date Value Ref Range Status   04/06/2023 3.42 (L) 3.77 - 5.28 10*6/mm3 Final     Hemoglobin   Date Value Ref Range Status   04/06/2023 9.2 (L) 12.0 - 15.9 g/dL Final     Hematocrit   Date Value Ref Range Status   04/06/2023 29.2 (L) 34.0 - 46.6 % Final     MCV   Date Value Ref Range Status   04/06/2023 85.4 79.0 - 97.0 fL Final     MCH   Date Value Ref Range Status   04/06/2023 26.9 26.6 - 33.0 pg Final     MCHC   Date Value Ref Range Status   04/06/2023 31.5 31.5 - 35.7 g/dL Final     RDW   Date Value Ref Range Status   04/06/2023 19.0 (H) 12.3 - 15.4 % Final     RDW-SD   Date Value Ref Range Status   04/06/2023 52.8 37.0 - 54.0 fl Final     MPV   Date Value Ref Range Status   04/06/2023 9.2 6.0 - 12.0 fL Final     Platelets   Date Value Ref Range Status   04/06/2023 295 140 - 450 10*3/mm3 Final     Neutrophil %   Date Value Ref Range Status   04/06/2023 73.1 42.7 - 76.0 % Final     Lymphocyte %   Date Value Ref Range Status   04/06/2023 23.4 19.6 - 45.3 % Final     Monocyte %   Date Value Ref Range Status   04/06/2023 2.4 (L) 5.0 - 12.0 % Final     Eosinophil %   Date Value Ref Range Status   04/06/2023 0.6 0.3 - 6.2 % Final     Basophil %   Date Value Ref Range Status   04/06/2023 0.5 0.0 - 1.5 % Final     External Neutrophils Abs   Date Value Ref Range Status   11/30/2022 7.6 (H) 1.7 - 6.0 x10(3)/ul Final     Neutrophils, Absolute   Date Value Ref Range Status   04/06/2023 6.44 1.70 - 7.00 10*3/mm3 Final     Lymphocytes, Absolute   Date Value Ref Range Status   04/06/2023 2.06 0.70 - 3.10 10*3/mm3 Final     Monocytes, Absolute   Date Value Ref Range Status   04/06/2023 0.21 0.10 - 0.90 10*3/mm3 Final     Eosinophils, Absolute   Date Value Ref Range Status   04/06/2023 0.05 0.00 - 0.40 10*3/mm3 Final     Basophils, Absolute   Date Value Ref Range Status    04/06/2023 0.04 0.00 - 0.20 10*3/mm3 Final     nRBC   Date Value Ref Range Status   11/11/2022 2.0 (H) 0.0 - 0.2 /100 WBC Final   10/16/2019 0.2 0.0 - 0.2 /100 WBC Final       Lab Results   Component Value Date    GLUCOSE 203 (H) 04/06/2023    BUN 19 04/06/2023    CREATININE 0.60 04/06/2023    EGFRIFNONA 133 02/02/2022    BCR 31.7 (H) 04/06/2023    K 4.1 04/06/2023    CO2 24.0 04/06/2023    CALCIUM 9.0 04/06/2023    ALBUMIN 4.4 04/06/2023    LABIL2 1.0 04/18/2019    AST 18 04/06/2023    ALT 17 04/06/2023           ASSESSMENT    · Accelerated phase CML, status post bone marrow biopsy on 11/3/2022.  Currently on Gleevec 600 mg daily.  This has not controlled her malignancy well for that reason we will switch patient to Ponatinib at 5 mg p.o. daily.  Reviewed the benefits, the side effects in detail  · She will now discontinue Gleevec and decrease hydroxyurea to 500 mg twice a day  · BCR ABL kinase mutation assay was negative  · Cardiomyopathy her most recent echo shows an EF of 44% which is an improvement from 26 to 30%.  She will continue to follow-up with her cardiologist  · Pain management, status post pain pump insertion.  She will follow-up with pain management at [facility] or [facility].  Her pain management seems to be adequate  · Anxiety: We will go ahead and refill Xanax  · CML in chronic phase with no significant hematologic or molecular or cytogenetic response on   Imatinib.  ABL kinase mutational analysis was negative.  We  have represcribed to Tasigna 300 mg twice a day.  Patient has been noncompliant with treatments and ended up in the hospital with hyperleukocytosis, peripheral blood blasts.  Bone marrow biopsy suggest that she remains in chronic phase.  Continue to Tasiigna at the current doses.  Hydroxyurea has been discontinued.  · Uncontrolled diabetes type 1, followed at [facility] by Dr. Calles  · Chronic anemia: Stable, multifactorial from CML, to Tasigna,  hydroxyurea.  This has improved  · Insomnia  · Situational anxiety, not suicidal.  Continue Xanax 0.5 mg once daily.  · Hyperleukocytosis secondary to CML  · History of medical noncompliance  · Pulmonary embolism: Xarelto restarted  · Recent COVID-19 pneumonia  · History of prolonged QTC        Plans    · CBC reviewed with patient  · Discontinue hydroxyurea due to normal platelets  · Discontinued Gleevac  · Continue ponatinib at new lower dose of 30 mg.    · Ponatinib 45 mg p.o. daily.  She was given information on this drug to review.  See above dose reduction.  · Continue allopurinol.  Check uric acid level.  · Check CMP and LDH  · We will obtain notes from Dr. Minaya at [facility] pain management to confirm there are no other options available through them.  We will also go ahead and enter a referral for palliative pain medication management through [facility].  · BCR ABL kinase mutational analysis was negative  · Request additional records from [facility]  · Informed patient that she will be seen on a weekly basis for now  · DC trazodone 25 mg due to QT prolongation  · Monitor electrolytes closely  · Encouraged her to follow-up with cardiology in regards to dosing of her diuretics.  · EKG reviewed.  She has slight prolongation of QT.  We will discontinue Zofran and Phenergan and use Compazine as needed for nausea.  Prescription has been sent to her pharmacy  • GI consult for continued nausea and vomiting and reported dark emesis.  She had EGD and colonoscopy coming up but they are requesting $400 upfront which she does not have.  I have asked  to investigate if there is an affordable option.  • Due to worsening anxiety increase Xanax to 0.5 mg p.o. nightly as needed number 30 pills.  Continue this for now.    • All questions answered  • Continue to follow-up with PCP per routine  • Follow-up with pain management per routine  • EKG today.  We will watch for results  • Follow-up with  Dr. Lerma in 1 week  • Follow-up with NP in 2 weeks        I spent 30 total minutes, face-to-face, caring for Hope today.  90% of this time involved counseling and/or coordination of care as documented within this note.

## 2023-04-06 ENCOUNTER — HOSPITAL ENCOUNTER (OUTPATIENT)
Dept: CARDIOLOGY | Facility: HOSPITAL | Age: 48
Discharge: HOME | End: 2023-04-06
Admitting: INTERNAL MEDICINE
Payer: MEDICARE

## 2023-04-06 ENCOUNTER — OFFICE VISIT (OUTPATIENT)
Dept: ONCOLOGY | Facility: CLINIC | Age: 48
End: 2023-04-06
Payer: MEDICARE

## 2023-04-06 ENCOUNTER — LAB (OUTPATIENT)
Dept: LAB | Facility: HOSPITAL | Age: 48
End: 2023-04-06
Payer: MEDICARE

## 2023-04-06 VITALS
SYSTOLIC BLOOD PRESSURE: 145 MMHG | HEIGHT: 64 IN | WEIGHT: 139.8 LBS | HEART RATE: 88 BPM | DIASTOLIC BLOOD PRESSURE: 87 MMHG | OXYGEN SATURATION: 100 % | BODY MASS INDEX: 23.87 KG/M2 | TEMPERATURE: 95.1 F

## 2023-04-06 DIAGNOSIS — Z51.81 ENCOUNTER FOR MEDICATION MONITORING: ICD-10-CM

## 2023-04-06 DIAGNOSIS — C92.10 CML (CHRONIC MYELOCYTIC LEUKEMIA): Primary | ICD-10-CM

## 2023-04-06 DIAGNOSIS — C92.10 CML (CHRONIC MYELOCYTIC LEUKEMIA): ICD-10-CM

## 2023-04-06 DIAGNOSIS — C95.90 LEUKEMIA NOT HAVING ACHIEVED REMISSION, UNSPECIFIED LEUKEMIA TYPE: ICD-10-CM

## 2023-04-06 DIAGNOSIS — E83.42 HYPOMAGNESEMIA: ICD-10-CM

## 2023-04-06 DIAGNOSIS — I50.9 CONGESTIVE HEART FAILURE, UNSPECIFIED HF CHRONICITY, UNSPECIFIED HEART FAILURE TYPE: ICD-10-CM

## 2023-04-06 DIAGNOSIS — G89.3 CHRONIC PAIN DUE TO MALIGNANT NEOPLASTIC DISEASE: ICD-10-CM

## 2023-04-06 LAB
ALBUMIN SERPL-MCNC: 4.4 G/DL (ref 3.5–5.2)
ALBUMIN/GLOB SERPL: 1.4 G/DL
ALP SERPL-CCNC: 604 U/L (ref 39–117)
ALT SERPL W P-5'-P-CCNC: 17 U/L (ref 1–33)
ANION GAP SERPL CALCULATED.3IONS-SCNC: 10 MMOL/L (ref 5–15)
AST SERPL-CCNC: 18 U/L (ref 1–32)
BASOPHILS # BLD AUTO: 0.04 10*3/MM3 (ref 0–0.2)
BASOPHILS NFR BLD AUTO: 0.5 % (ref 0–1.5)
BILIRUB SERPL-MCNC: 1.6 MG/DL (ref 0–1.2)
BUN SERPL-MCNC: 19 MG/DL (ref 6–20)
BUN/CREAT SERPL: 31.7 (ref 7–25)
CALCIUM SPEC-SCNC: 9 MG/DL (ref 8.6–10.5)
CHLORIDE SERPL-SCNC: 106 MMOL/L (ref 98–107)
CO2 SERPL-SCNC: 24 MMOL/L (ref 22–29)
CREAT SERPL-MCNC: 0.6 MG/DL (ref 0.57–1)
DEPRECATED RDW RBC AUTO: 52.8 FL (ref 37–54)
EGFRCR SERPLBLD CKD-EPI 2021: 111.6 ML/MIN/1.73
EOSINOPHIL # BLD AUTO: 0.05 10*3/MM3 (ref 0–0.4)
EOSINOPHIL NFR BLD AUTO: 0.6 % (ref 0.3–6.2)
ERYTHROCYTE [DISTWIDTH] IN BLOOD BY AUTOMATED COUNT: 19 % (ref 12.3–15.4)
GLOBULIN UR ELPH-MCNC: 3.1 GM/DL
GLUCOSE SERPL-MCNC: 203 MG/DL (ref 65–99)
HCT VFR BLD AUTO: 29.2 % (ref 34–46.6)
HGB BLD-MCNC: 9.2 G/DL (ref 12–15.9)
HOLD SPECIMEN: NORMAL
LDH SERPL-CCNC: 188 U/L (ref 135–214)
LYMPHOCYTES # BLD AUTO: 2.06 10*3/MM3 (ref 0.7–3.1)
LYMPHOCYTES NFR BLD AUTO: 23.4 % (ref 19.6–45.3)
MAGNESIUM SERPL-MCNC: 2.2 MG/DL (ref 1.6–2.6)
MCH RBC QN AUTO: 26.9 PG (ref 26.6–33)
MCHC RBC AUTO-ENTMCNC: 31.5 G/DL (ref 31.5–35.7)
MCV RBC AUTO: 85.4 FL (ref 79–97)
MONOCYTES # BLD AUTO: 0.21 10*3/MM3 (ref 0.1–0.9)
MONOCYTES NFR BLD AUTO: 2.4 % (ref 5–12)
NEUTROPHILS NFR BLD AUTO: 6.44 10*3/MM3 (ref 1.7–7)
NEUTROPHILS NFR BLD AUTO: 73.1 % (ref 42.7–76)
PLATELET # BLD AUTO: 295 10*3/MM3 (ref 140–450)
PMV BLD AUTO: 9.2 FL (ref 6–12)
POTASSIUM SERPL-SCNC: 4.1 MMOL/L (ref 3.5–5.2)
PROT SERPL-MCNC: 7.5 G/DL (ref 6–8.5)
RBC # BLD AUTO: 3.42 10*6/MM3 (ref 3.77–5.28)
SODIUM SERPL-SCNC: 140 MMOL/L (ref 136–145)
URATE SERPL-MCNC: 6.7 MG/DL (ref 2.4–5.7)
WBC NRBC COR # BLD: 8.8 10*3/MM3 (ref 3.4–10.8)

## 2023-04-06 PROCEDURE — 80053 COMPREHEN METABOLIC PANEL: CPT | Performed by: INTERNAL MEDICINE

## 2023-04-06 PROCEDURE — 93010 ELECTROCARDIOGRAM REPORT: CPT | Performed by: INTERNAL MEDICINE

## 2023-04-06 PROCEDURE — 1159F MED LIST DOCD IN RCRD: CPT | Performed by: NURSE PRACTITIONER

## 2023-04-06 PROCEDURE — 85025 COMPLETE CBC W/AUTO DIFF WBC: CPT

## 2023-04-06 PROCEDURE — 83735 ASSAY OF MAGNESIUM: CPT | Performed by: INTERNAL MEDICINE

## 2023-04-06 PROCEDURE — 36415 COLL VENOUS BLD VENIPUNCTURE: CPT

## 2023-04-06 PROCEDURE — 99214 OFFICE O/P EST MOD 30 MIN: CPT | Performed by: NURSE PRACTITIONER

## 2023-04-06 PROCEDURE — 83615 LACTATE (LD) (LDH) ENZYME: CPT | Performed by: NURSE PRACTITIONER

## 2023-04-06 PROCEDURE — 1160F RVW MEDS BY RX/DR IN RCRD: CPT | Performed by: NURSE PRACTITIONER

## 2023-04-06 PROCEDURE — 84550 ASSAY OF BLOOD/URIC ACID: CPT | Performed by: NURSE PRACTITIONER

## 2023-04-06 PROCEDURE — 93005 ELECTROCARDIOGRAM TRACING: CPT | Performed by: INTERNAL MEDICINE

## 2023-04-06 PROCEDURE — 1125F AMNT PAIN NOTED PAIN PRSNT: CPT | Performed by: NURSE PRACTITIONER

## 2023-04-08 ENCOUNTER — HOSPITAL ENCOUNTER (EMERGENCY)
Facility: HOSPITAL | Age: 48
Discharge: HOME | End: 2023-04-08
Attending: EMERGENCY MEDICINE | Admitting: EMERGENCY MEDICINE
Payer: MEDICARE

## 2023-04-08 VITALS
SYSTOLIC BLOOD PRESSURE: 154 MMHG | RESPIRATION RATE: 16 BRPM | OXYGEN SATURATION: 100 % | HEIGHT: 63 IN | TEMPERATURE: 97.9 F | DIASTOLIC BLOOD PRESSURE: 95 MMHG | BODY MASS INDEX: 23.91 KG/M2 | WEIGHT: 134.92 LBS | HEART RATE: 98 BPM

## 2023-04-08 DIAGNOSIS — M54.9 SPINE PAIN, MULTILEVEL: Primary | ICD-10-CM

## 2023-04-08 PROCEDURE — 99283 EMERGENCY DEPT VISIT LOW MDM: CPT

## 2023-04-08 PROCEDURE — 63710000001 ONDANSETRON ODT 4 MG TABLET DISPERSIBLE: Performed by: NURSE PRACTITIONER

## 2023-04-08 RX ORDER — OXYCODONE HYDROCHLORIDE 5 MG/1
10 TABLET ORAL ONCE
Status: COMPLETED | OUTPATIENT
Start: 2023-04-08 | End: 2023-04-08

## 2023-04-08 RX ORDER — ONDANSETRON 4 MG/1
4 TABLET, ORALLY DISINTEGRATING ORAL ONCE
Status: COMPLETED | OUTPATIENT
Start: 2023-04-08 | End: 2023-04-08

## 2023-04-08 RX ADMIN — ONDANSETRON 4 MG: 4 TABLET, ORALLY DISINTEGRATING ORAL at 12:53

## 2023-04-08 RX ADMIN — OXYCODONE HYDROCHLORIDE 10 MG: 5 TABLET ORAL at 12:53

## 2023-04-08 NOTE — DISCHARGE INSTRUCTIONS
As discussed, this provider did offer observation and you declined.  Call pain management for further management of chronic pain syndrome.  Schedule follow-up with primary care as needed.  Return to the ER for new or worsening symptoms.

## 2023-04-08 NOTE — ED NOTES
Wheelchair offered to patient to transport to car. Patient denied. Patient ambulated independently to the exit of the ER.

## 2023-04-08 NOTE — ED PROVIDER NOTES
"Subjective   History of Present Illness  47-year-old female presents with a 2-week history of spine pain that extends from her neck all the way to the lumbar region and radiates to bilateral knees.  Patient stated \"it feels like being hit by a baseball bat from the next to the knees\".  She denies associated fever sweats chills.  She denies injury.  She reports \"I feel like my Dilaudid pump is not working and I told pain management that\".  She reports calling primary care and being referred to the ER.  She denies saddle paresthesia urinary bowel incontinence.  She reports trying the following without relief: Steroid Dosepak, tizanidine, steroid injection at pain management, Tylenol, Motrin, IcyHot, Aleve.  She also reports being followed by oncology for CML.    Primary care: Dr. Kay    Oncology: Dr. Pfeiffer    Pain management: Dr. Shelton        Review of Systems    Past Medical History:   Diagnosis Date   • Bone pain    • COVID-19 virus infection 2022   • Diabetes mellitus    • Extremity pain     Carlin. legs pain   • Leg pain     left leg greater   • Migraine    • Pulmonary embolism    • Vision loss     doing surgery       No Known Allergies    Past Surgical History:   Procedure Laterality Date   • BONE MARROW BIOPSY     • BREAST SURGERY     • BRONCHOSCOPY N/A 2022    Procedure: BRONCHOSCOPY bilateral lung washing;  Surgeon: Charlene Camara MD;  Location: [facility] ENDOSCOPY;  Service: Pulmonary;  Laterality: N/A;  post: rule out infection vs transfusion lung injury   •  SECTION     • CHOLECYSTECTOMY     • EYE SURGERY      laser surgery due  to hemmorage--- 2021-- another surgery  lt eye11/15/21   • RETINAL DETACHMENT SURGERY     • SPINE SURGERY      Lombardi spinal block   • TUBAL ABDOMINAL LIGATION         Family History   Problem Relation Age of Onset   • Diabetes Mother    • Diabetes Maternal Grandmother    • Heart attack Maternal Grandmother    • Stroke Maternal Grandmother        Social History "     Socioeconomic History   • Marital status: Legally    Tobacco Use   • Smoking status: Never   • Smokeless tobacco: Never   Vaping Use   • Vaping Use: Never used   Substance and Sexual Activity   • Alcohol use: No   • Drug use: No   • Sexual activity: Defer           Objective   Physical Exam  Vitals and nursing note reviewed.   Constitutional:       General: She is awake. She is not in acute distress.     Appearance: Normal appearance. She is well-developed and normal weight. She is not ill-appearing or diaphoretic.   Cardiovascular:      Pulses: Normal pulses.           Radial pulses are 2+ on the right side and 2+ on the left side.        Dorsalis pedis pulses are 2+ on the right side and 2+ on the left side.        Posterior tibial pulses are 2+ on the right side and 2+ on the left side.   Musculoskeletal:         General: Tenderness present. No swelling, deformity or signs of injury. Normal range of motion.      Cervical back: Bony tenderness present.      Thoracic back: Bony tenderness present.      Lumbar back: Bony tenderness present.        Back:    Skin:     General: Skin is warm and dry.      Capillary Refill: Capillary refill takes less than 2 seconds.      Coloration: Skin is not pale.   Neurological:      General: No focal deficit present.      Mental Status: She is alert and oriented to person, place, and time.      GCS: GCS eye subscore is 4. GCS verbal subscore is 5. GCS motor subscore is 6.      Sensory: Sensation is intact. No sensory deficit.      Motor: Motor function is intact. No weakness or abnormal muscle tone.      Deep Tendon Reflexes: Reflexes normal.      Reflex Scores:       Patellar reflexes are 2+ on the right side and 2+ on the left side.  Psychiatric:         Mood and Affect: Mood normal.         Behavior: Behavior normal. Behavior is cooperative.         Thought Content: Thought content normal.         Judgment: Judgment normal.         Procedures                "                               ED Course                                           Medical Decision Making  Risk  Prescription drug management.        Interpreted by radiologist as below:     No radiology results for the last day      /95   Pulse 98   Temp 97.9 °F (36.6 °C) (Oral)   Resp 16   Ht 160 cm (63\")   Wt 61.2 kg (134 lb 14.7 oz)   LMP 05/25/2022 (Approximate)   SpO2 100%   BMI 23.90 kg/m²      Lab Results (last 24 hours)     ** No results found for the last 24 hours. **           Medications   oxyCODONE (ROXICODONE) immediate release tablet 10 mg (10 mg Oral Given 4/8/23 1253)   ondansetron ODT (ZOFRAN-ODT) disintegrating tablet 4 mg (4 mg Oral Given 4/8/23 1253)        Patient undressed and placed in gown for exam.  Appropriate PPE worn during patient exam.  Appropriate monitoring initiated. Patient is alert and oriented x3.  No acute distress noted.  Patient has point tenderness throughout entire spine.  There is no overlying erythema ecchymosis edema nor bony step-off.  DTR strong and equal bilaterally 2+.  Motor function sensation intact.  Distal pulses strong equal bilaterally 2+.  Cap refill less than 2 seconds.  Patient was given 1 dose of immediate release oxycodone 10 mg p.o. and Zofran 4 mg ODT.  She was offered observation stay for pain control but declined.    INSPECT performed per Alexander, ED Pharmacist, see above.     I reviewed chart 4/6/23 patient was seen by Oncology for CML. She receives Imatinib infusions.  3/31/2023 patient was seen at pain management, see scans above. Dose of Dilaudid ITP increased.     Disposition/Treatment: Discussed results with patient, verbalized understanding. Discussed reasons to return to the ER, patient verbalized understanding. Agreeable with plan of care. Patient was stable upon discharge.    Upon reassessment, patient is flesh tone warm and dry no acute distress noted.  Vital signs are stable.    Part of this note may be an electronic " transcription/translation of spoken language to printed text using the Dragon Dictation System.       Final diagnoses:   Spine pain, multilevel       ED Disposition  ED Disposition     ED Disposition   Discharge    Condition   Stable    Comment   --             Alida Latham, APRN  [address]  [place] [address] [phone]    Schedule an appointment as soon as possible for a visit       [facility] EMERGENCY DEPARTMENT  [facility] [facility] [address] [phone]  Go to   If symptoms worsen         Medication List      No changes were made to your prescriptions during this visit.          Lucy Vang, APRN  04/08/23 1444

## 2023-04-08 NOTE — ED NOTES
At Lucy's request, I called [facility] Medical Records at [phone] and asked Rhonda to fax the last office note from pain management clinic over on this pt.  Pain mgmt office [phone] is closed.  She states she does have access to those records and will fax it over shortly.

## 2023-04-09 LAB — QT INTERVAL: 383 MS

## 2023-04-10 ENCOUNTER — SPECIALTY PHARMACY (OUTPATIENT)
Dept: PHARMACY | Facility: HOSPITAL | Age: 48
End: 2023-04-10
Payer: MEDICARE

## 2023-04-10 NOTE — PROGRESS NOTES
Specialty Pharmacy Note: Ponatinib       EKG 4/6/23, [id number]. No changes in ponatinib needed based on EKG.      Thanks,    Ros CALHOUN, PharmD

## 2023-04-17 ENCOUNTER — TELEPHONE (OUTPATIENT)
Dept: ONCOLOGY | Facility: CLINIC | Age: 48
End: 2023-04-17
Payer: MEDICARE

## 2023-04-17 NOTE — TELEPHONE ENCOUNTER
pt has pain device in her back, however, pt doesn't believe this is working properly. advises that pain mgmt md isn't in the office until next month, and needs pain relief now. requesting either an appt or suggestion of what she can do

## 2023-04-17 NOTE — TELEPHONE ENCOUNTER
Received a call from the pt's , Deny, stating that they think her pain pump is not working and asking if Dr. Lerma could order a muscle relaxer. I told him that we cannot prescribe any pain medication since she is enrolled with pain management and I advised that he reach out to their office. He stated that he called earlier and was told that the pt's doctor is out of the office until next month, but she can't wait that long. I asked which pain management office she goes to, he stated that it's in [place]. I asked for a phone number and told him I would reach out to their office. I called the [facility] Pain [facility] office and left a message on the nurse line. I received a call back and was told that the pt's  had called there, but just asked when her follow-up was. They stated that they were not told that her pain pump may be malfunctioning. They stated that they would reach out to the pt and the doctor.     Deny called again and asked for an update. I told him that the pain management office should be reaching out to him and advised that he call them back if he doesn't hear anything from them. He verbalized understanding.

## 2023-04-26 ENCOUNTER — TELEPHONE (OUTPATIENT)
Dept: PHARMACY | Facility: HOSPITAL | Age: 48
End: 2023-04-26
Payer: MEDICARE

## 2023-04-26 NOTE — TELEPHONE ENCOUNTER
Specialty Pharmacy Note: Ponatinib    Attempted to reach patient via telephone.  Left  with call back to [phone].  Called other number on contact and was able to get Sayra.  She is planning to visit [place] on Friday and will call while there to reschedule her appointments.  She said patient has been having a lot of pain but unsure if she is taking medication currently.    Thanks,    Ros CALHOUN, PharmD

## 2023-05-12 ENCOUNTER — TELEPHONE (OUTPATIENT)
Dept: ONCOLOGY | Facility: CLINIC | Age: 48
End: 2023-05-12
Payer: MEDICARE

## 2023-05-15 NOTE — PROGRESS NOTES
Hematology/Oncology Outpatient Follow Up    PATIENT NAME:Nieves Mc  :1975  MRN: [id number]  PRIMARY CARE PHYSICIAN: Alida Latham APRN  REFERRING PHYSICIAN: Alida Latham, *    Chief Complaint   Patient presents with   • Follow-up     Follow up  CML (chronic myelocytic leukemia)        HISTORY OF PRESENT ILLNESS:      Patient is a 47 y.o. female with PMH significant for CML on treatment with Imatinib.  Patient stated that she typically feels poorly with Imatinib with frequent nausea and vomiting.  Also complained of weakness and fatigue.  Patient presented to ED on 10/22/18 with complaints of an elevated blood sugar around 400.  Stated she felt poorly and has had a productive cough with yellow sputum.  Complained of nausea and stated she was unable to keep any food down anytime she ate.  The patient reported subjective fever without chills.  She stated she had been coughing so hard that she was vomiting.  She denied hematemesis.  Denied diarrhea, constipation or blood in her stool.       Patient’s chest x-ray showed no evidence of acute pulmonary embolus.  The patient has ground-glass opacities throughout the lungs.  There is some air trapping noted which would appear to be   infectious.  Patient was started on antibiotics.      Hem/Onc was consulted on 10/23/18 for co-management of patient with CML.  Patient was seen by Dr. Lerma on 10/12 on previous admission for patient reported CML on Imatinib (Gleevec).  Patient reports she is under the care of Dr. Roach at [facility] in [place].  Patient was seen by Dr. Lerma in May 2018 when patient presented with hematuria, which was attributed to her Imatinib.  Dr. Lerma followed during hospitalization but then patient returned to Dr. Roach for her usual follow up.  She was again seen on 10/12.  Patient is stating she will switch to Dr. Lerma.    · Review of Dr. Roach’s note:  On 18 patient had BCR-abl which was  61.326.  Patient had been on Imatinib 400 mg daily.  She had presented in March 2018 with WBC of 24, hemoglobin 13.1, platelet count of 1,067,000.  Patient apparently had follow up BCR-abl in August 2018, results are not available for review.  Her bone marrow aspiration and biopsy was also performed at that time is currently not available for review.    · 11/6/18 - .  Uric acid 6.5.  BCR-abl by RT-PCR was measured at 3.36%.    · Due to thrombocytosis, patient was placed on Hydroxyurea 500 mg twice a day on 12/11/18.  Platelet count juana to 900,000.  Patient was noncompliant with CBCs that were recommended.    Patient was asked to return to the office after not being able to reach her.   · 12/27/18 - Bone marrow aspiration and biopsy was consistent with chronic myeloid leukemia.  BCR-abl positive, chronic phase.  ABL kinase mutational analysis is currently pending on the bone marrow.    · 1/3/19 - Repeat CBC, WBC 17, hemoglobin 11.9, platelet count 1,072,000.  Hydroxyurea was increased to 1 gm twice a day with follow up CBC.    · 1/6/19 - Follow up CBC showed progressive increase in platelet to 1.1 million.  Patient was offered admission to the hospital, which she declined.  Hydroxyurea was increased to 1 gm three   times a day as of 1/6/19.   · 1/7/19 - EKG shows sinus tachycardia.   milliseconds.   ·  ABL kinase on peripheral blood was ordered on 1/11/19.  Based on sequence analysis, there was no mutation detected.    · 2/12/19 - Patient was initiated on Tasigna 300 mg twice a day.  · 2/13/19 - Urinalysis was negative for hematuria.   · 2/22/19 -  milliseconds.  · 3/13/19 - EKG with QTC is 413 milliseconds.  Nonspecific changes noted.    · 4/18/19 - EKG showed QTC prolonged to 453 milliseconds.  This is changed from prior.  · 4/18/19 - Free T4 normal at 0.91.  Magnesium was 1.7.  BUN is 6.  Creatinine 0.7.  Bilirubin 2.2.  Phosphorous normal 3.7.  TSH 0.43, normal.  Free T3 was normal at 3.47.   Ionized calcium   normal at 1.23.  BCR-abl was 0.522%.    · 5/7/19 - EKG showed QTC of 441 milliseconds.  QT was 366.     · Patient has been lost to follow-up since 2019 for CML.  Patient states that she lost her insurance.  She also does not have any primary care physician at this time.  She is diabetic on insulin.  · Patient was recently admitted to the hospital for pneumonia due to COVID-19 infection.  At that time patient made a decision to become compliant with treatment.  She is currently on to [employer] 300 mg p.o. twice a day.  She is also on hydroxyurea 1 g twice a day.  Overall she feels better, she has more energy.  · 3/1/2022 white count is 53.92, hemoglobin 11.7 and platelets are 472    · 6/1/2022: Patient has not been seen since March 2022.  Also verified that she has not been taking to Tasigna since February 2022.  She comes in today with cough for 1 and half months, shortness of breath, denies fevers.  · 6/30/2022 patient had 2D echocardiogram which showed an EF of 41 to 45%.  Left ventricular cavity is mildly dilated.  She was diagnosed with nonischemic cardiomyopathy    · 11/18/2022: Patient was transferred from [facility] to [facility] due to cardiac failure.  She was then seen by NP [facility] hematology department.  Patient underwent tumor aspiration and biopsy at that time did not show chronic myeloid leukemia in accelerated phase markedly hypercellular marrow with megakaryocytic and myeloid hyperplasia with atypia and increased basophils blasts are not increased less than 1% moderate reticulin fibrosis.  According to the patient hydroxyurea was discontinued she was prescribed Gleevec 600 mg daily but she has only been taking 400 mg as she cannot find other milligram tablets.  She is already been pump inserted into her pelvic area.  She continues to experience nausea which resulted in her not taking the baclofen she should.  · 1/12/2023: WBC 17.64, hemoglobin 10.2, MCV 88.8,  platelets 458,000.  On Gleevec 600 mg daily and hydroxyurea 500 mg twice daily.  · 2023: WBC 10.67, hemoglobin 10.0, MCV 86.4, platelets 370,000.  Patient on Gleevec 600 mg daily and hydroxyurea 500 mg once a day.  Patient instructed at the visit to discontinue her hydroxyurea.  · 2023: WBC 17.75, hemoglobin 10.4, MCV 87.2, platelets 425,000.  On Gleevec 600 mg daily.  · 3/10/2023: 2D echocardiogram showing EF of 44% EKG showing sinus tachycardia QTc of 491    Past Medical History:   Diagnosis Date   • Bone pain    • COVID-19 virus infection 2022   • Diabetes mellitus    • Extremity pain     Carlin. legs pain   • Leg pain     left leg greater   • Migraine    • Pulmonary embolism    • Vision loss     doing surgery       Past Surgical History:   Procedure Laterality Date   • BONE MARROW BIOPSY     • BREAST SURGERY     • BRONCHOSCOPY N/A 2022    Procedure: BRONCHOSCOPY bilateral lung washing;  Surgeon: Charlene Camara MD;  Location: [facility] ENDOSCOPY;  Service: Pulmonary;  Laterality: N/A;  post: rule out infection vs transfusion lung injury   •  SECTION     • CHOLECYSTECTOMY     • EYE SURGERY      laser surgery due  to hemmorage--- 2021-- another surgery  lt eye11/15/21   • RETINAL DETACHMENT SURGERY     • SPINE SURGERY      Lombardi spinal block   • TUBAL ABDOMINAL LIGATION           Current Outpatient Medications:   •  acetaminophen (TYLENOL) 325 MG tablet, 2 tablets., Disp: , Rfl:   •  allopurinol (ZYLOPRIM) 100 MG tablet, Take 4 tablets by mouth 2 (Two) Times a Day., Disp: 240 tablet, Rfl: 0  •  allopurinol (Zyloprim) 300 MG tablet, Take 1 tablet by mouth Daily., Disp: 30 tablet, Rfl: 2  •  ALPRAZolam (Xanax) 0.5 MG tablet, Take 1 tablet by mouth At Night As Needed for Anxiety., Disp: 30 tablet, Rfl: 0  •  budesonide-formoterol (SYMBICORT) 160-4.5 MCG/ACT inhaler, Inhale 2 puffs 2 (Two) Times a Day., Disp: 10.2 g, Rfl: 1  •  citalopram (CeleXA) 10 MG tablet, Take 1 tablet by mouth Daily.,  Disp: , Rfl:   •  Continuous Blood Gluc Sensor (FreeStyle Zahira 2 Sensor) misc, , Disp: , Rfl:   •  dapagliflozin Propanediol (Farxiga) 10 MG tablet, Take 10 mg by mouth Daily., Disp: 90 tablet, Rfl: 3  •  furosemide (LASIX) 20 MG tablet, Take 1 tablet by mouth Daily., Disp: , Rfl:   •  gabapentin (NEURONTIN) 300 MG capsule, Take 1 capsule by mouth Daily., Disp: , Rfl:   •  hydrOXYzine (ATARAX) 25 MG tablet, Take 1 tablet by mouth Every 8 (Eight) Hours As Needed., Disp: , Rfl:   •  Insulin Glargine (BASAGLAR KWIKPEN) 100 UNIT/ML injection pen, Inject 20 Units under the skin into the appropriate area as directed Daily., Disp: 10 mL, Rfl: 3  •  Insulin Lispro (ADMELOG SOLOSTAR) 100 UNIT/ML injection pen, Inject 6 Units under the skin into the appropriate area as directed 3 (Three) Times a Day., Disp: 3 mL, Rfl: 3  •  losartan (COZAAR) 25 MG tablet, Take 1 tablet by mouth Daily., Disp: 90 tablet, Rfl: 3  •  metoprolol succinate XL (TOPROL-XL) 25 MG 24 hr tablet, Take 1 tablet by mouth Daily., Disp: 90 tablet, Rfl: 3  •  mirtazapine (REMERON) 15 MG tablet, Take 1 tablet by mouth every night at bedtime., Disp: 30 tablet, Rfl: 2  •  oxymetazoline (AFRIN) 0.05 % nasal spray,  2 Spray, Nostrils Both, Spray, BID, 0 Refill(s), Disp: , Rfl:   •  PONATinib (ICLUSIG) 15 MG chemo tablet, Take 2 tablets by mouth Daily. Take with or without food.  Swallow tablets whole, do not chew, crush, or dissovle, Disp: 60 tablet, Rfl: 3  •  potassium chloride (K-DUR,KLOR-CON) 20 MEQ CR tablet, Take 1 tablet by mouth Daily., Disp: , Rfl:   •  potassium chloride (K-TAB) 20 MEQ tablet controlled-release ER tablet, Take 1 tablet by mouth Daily., Disp: 90 tablet, Rfl: 3  •  prochlorperazine (COMPAZINE) 10 MG tablet, Take 1 tablet by mouth Every 6 (Six) Hours As Needed for Nausea or Vomiting., Disp: 30 tablet, Rfl: 1  •  promethazine (PHENERGAN) 25 MG tablet, Take 1 tablet by mouth As Needed., Disp: , Rfl:   •  tiZANidine (ZANAFLEX) 4 MG tablet,  Take 1 tablet by mouth Daily., Disp: , Rfl:   •  traZODone (DESYREL) 50 MG tablet, Take 25 mg by mouth every night at bedtime., Disp: , Rfl:   •  furosemide (LASIX) 40 MG tablet, Take 1 tablet by mouth 2 (Two) Times a Day for 14 days., Disp: 28 tablet, Rfl: 3  •  hydroxyurea (Hydrea) 500 MG capsule, Take 2 capsules by mouth 2 (Two) Times a Day., Disp: 120 capsule, Rfl: 1  •  levoFLOXacin (Levaquin) 500 MG tablet, Take 1 tablet by mouth Daily., Disp: 7 tablet, Rfl: 0  •  ondansetron ODT (ZOFRAN-ODT) 8 MG disintegrating tablet, Place 1 tablet on the tongue Every 8 (Eight) Hours As Needed for Nausea or Vomiting., Disp: 20 tablet, Rfl: 2  No current facility-administered medications for this visit.    Facility-Administered Medications Ordered in Other Visits:   •  sodium chloride 0.9 % infusion 250 mL, 250 mL, Intravenous, PRN, Denisha Gill APRN    No Known Allergies    Family History   Problem Relation Age of Onset   • Diabetes Mother    • Diabetes Maternal Grandmother    • Heart attack Maternal Grandmother    • Stroke Maternal Grandmother        Cancer-related family history is not on file.    Social History     Tobacco Use   • Smoking status: Never   • Smokeless tobacco: Never   Vaping Use   • Vaping Use: Never used   Substance Use Topics   • Alcohol use: No   • Drug use: No     I have reviewed and confirmed the accuracy of the patient's history: Chief complaint, HPI, ROS and Subjective as entered by the MA/LPN/RN. JP Varela 05/17/23     SUBJECTIVE:  Patient is here today accompanied by her  for follow-up.  She states that she is still having issues with pain control and that she just all her pain management physician and increased her pain pump and also refilled it.  He told her that if this is not effective he will add an additional medication with the pain medication at the next visit.  She is feeling emotional and did voice SI but no plan more of a feeling that she just does not  "want to be here anymore sometimes.  She had a falling out with her mother and that has been hard.  Her  started a new job so that he would be able to be home each day but now they have no vehicle for transportation because a truck came with the old job.  She is bruising easily and had some nosebleeds.  She is being compliant with her ponatinib.  She is still having difficulty with nausea and vomiting especially over the last 2 days.  She is using promethazine at home with minimal effect.  She has not followed up with GI.  She has lost 20 pounds since her last appointment 6 weeks ago.    REVIEW OF SYSTEMS:    Review of Systems   Constitutional: Negative for chills and fever.   HENT: Negative for ear pain, mouth sores, nosebleeds and sore throat.    Eyes: Negative for photophobia and visual disturbance.   Respiratory: Negative for wheezing and stridor.    Cardiovascular: Negative for chest pain and palpitations.   Gastrointestinal: Negative for abdominal pain, diarrhea, nausea and vomiting.   Endocrine: Negative for cold intolerance and heat intolerance.   Genitourinary: Negative for dysuria and hematuria.   Musculoskeletal: Negative for joint swelling and neck stiffness.  Positive for back and leg pain.   Skin: Negative for color change and rash.   Neurological: Negative for seizures and syncope.   Hematological: Negative for adenopathy.        No obvious bleeding   Psychiatric/Behavioral: Negative for agitation, confusion and hallucinations. Positive for insomnia, anxiety, depression.      OBJECTIVE:    Vitals:    05/17/23 1105   BP: 125/80   Pulse: 115   SpO2: 100%   Weight: 50 kg (110 lb 3.2 oz)   Height: 162.6 cm (64\")   PainSc: 0-No pain     Body mass index is 18.92 kg/m².    ECOG  (1) Restricted in physically strenuous activity, ambulatory and able to do work of light nature    Physical Exam  Vitals and nursing note reviewed.   Constitutional:       General: She is not in acute distress.     Appearance: " She is not diaphoretic.   HENT:      Head: Normocephalic and atraumatic.   Eyes:      General: No scleral icterus.        Right eye: No discharge.         Left eye: No discharge.      Conjunctiva/sclera: Conjunctivae normal.   Neck:      Thyroid: No thyromegaly.   Cardiovascular:      Rate and Rhythm: Normal rate and regular rhythm.      Heart sounds: Normal heart sounds. No friction rub. No gallop.    Pulmonary:      Effort: Pulmonary effort is normal. No respiratory distress.      Breath sounds: No stridor. No wheezing.   Abdominal:      General: Bowel sounds are normal.      Palpations: Abdomen is soft. There is no mass.      Tenderness: There is no abdominal tenderness. There is no guarding or rebound.   Musculoskeletal:         General: No tenderness. Normal range of motion.      Cervical back: Normal range of motion and neck supple.   Lymphadenopathy:      Cervical: No cervical adenopathy.   Skin:     General: Skin is warm.      Findings: No erythema or rash.   Neurological:      Mental Status: She is alert and oriented to person, place, and time.      Motor: No abnormal muscle tone.   Psychiatric:         Behavior: Behavior anxious and tearful    I have reexamined the patient and the results are consistent with the previously documented exam. Denisha Gill, APRN       RECENT LABS    WBC   Date Value Ref Range Status   05/17/2023 2.66 (L) 3.40 - 10.80 10*3/mm3 Final     RBC   Date Value Ref Range Status   05/17/2023 3.07 (L) 3.77 - 5.28 10*6/mm3 Final     Hemoglobin   Date Value Ref Range Status   05/17/2023 8.6 (L) 12.0 - 15.9 g/dL Final     Hematocrit   Date Value Ref Range Status   05/17/2023 25.1 (L) 34.0 - 46.6 % Final     MCV   Date Value Ref Range Status   05/17/2023 81.8 79.0 - 97.0 fL Final     MCH   Date Value Ref Range Status   05/17/2023 28.0 26.6 - 33.0 pg Final     MCHC   Date Value Ref Range Status   05/17/2023 34.3 31.5 - 35.7 g/dL Final     RDW   Date Value Ref Range Status   05/17/2023  17.9 (H) 12.3 - 15.4 % Final     RDW-SD   Date Value Ref Range Status   05/17/2023 51.4 37.0 - 54.0 fl Final     MPV   Date Value Ref Range Status   05/17/2023 9.9 6.0 - 12.0 fL Final     Platelets   Date Value Ref Range Status   05/17/2023 10 (C) 140 - 450 10*3/mm3 Final     Neutrophil %   Date Value Ref Range Status   05/17/2023 4.9 (L) 42.7 - 76.0 % Final     Lymphocyte %   Date Value Ref Range Status   05/17/2023 93.2 (H) 19.6 - 45.3 % Final     Monocyte %   Date Value Ref Range Status   05/17/2023 1.5 (L) 5.0 - 12.0 % Final     Eosinophil %   Date Value Ref Range Status   05/17/2023 0.4 0.3 - 6.2 % Final     Basophil %   Date Value Ref Range Status   05/17/2023 0.0 0.0 - 1.5 % Final     External Neutrophils Abs   Date Value Ref Range Status   11/30/2022 7.6 (H) 1.7 - 6.0 x10(3)/ul Final     Neutrophils, Absolute   Date Value Ref Range Status   05/17/2023 0.13 (C) 1.70 - 7.00 10*3/mm3 Final     Lymphocytes, Absolute   Date Value Ref Range Status   05/17/2023 2.48 0.70 - 3.10 10*3/mm3 Final     Monocytes, Absolute   Date Value Ref Range Status   05/17/2023 0.04 (L) 0.10 - 0.90 10*3/mm3 Final     Eosinophils, Absolute   Date Value Ref Range Status   05/17/2023 0.01 0.00 - 0.40 10*3/mm3 Final     Basophils, Absolute   Date Value Ref Range Status   05/17/2023 0.00 0.00 - 0.20 10*3/mm3 Final     nRBC   Date Value Ref Range Status   11/11/2022 2.0 (H) 0.0 - 0.2 /100 WBC Final   10/16/2019 0.2 0.0 - 0.2 /100 WBC Final       Lab Results   Component Value Date    GLUCOSE 203 (H) 04/06/2023    BUN 19 04/06/2023    CREATININE 0.60 04/06/2023    EGFRIFNONA 133 02/02/2022    BCR 31.7 (H) 04/06/2023    K 4.1 04/06/2023    CO2 24.0 04/06/2023    CALCIUM 9.0 04/06/2023    ALBUMIN 4.4 04/06/2023    LABIL2 1.0 04/18/2019    AST 18 04/06/2023    ALT 17 04/06/2023           ASSESSMENT    · Accelerated phase CML, status post bone marrow biopsy on 11/3/2022.  Currently on Gleevec 600 mg daily.  This has not controlled her malignancy  well for that reason we will switch patient to Ponatinib at 5 mg p.o. daily.  Reviewed the benefits, the side effects in detail  · She will now discontinue Gleevec and decrease hydroxyurea to 500 mg twice a day  · BCR ABL kinase mutation assay was negative  · Cardiomyopathy her most recent echo shows an EF of 44% which is an improvement from 26 to 30%.  She will continue to follow-up with her cardiologist  · Pain management, status post pain pump insertion.  She will follow-up with pain management at [place] or [facility].  Her pain management seems to be adequate  · Anxiety: We will go ahead and refill Xanax  · CML in chronic phase with no significant hematologic or molecular or cytogenetic response on   Imatinib.  ABL kinase mutational analysis was negative.  We  have represcribed to Tasigna 300 mg twice a day.  Patient has been noncompliant with treatments and ended up in the hospital with hyperleukocytosis, peripheral blood blasts.  Bone marrow biopsy suggest that she remains in chronic phase.  Continue to Tasiigna at the current doses.  Hydroxyurea has been discontinued.  · Uncontrolled diabetes type 1, followed at the [facility] by Dr. Calles  · Chronic anemia: Stable, multifactorial from CML, to Tasigna, hydroxyurea.  This has improved  · Insomnia  · Situational anxiety, not suicidal.  Continue Xanax 0.5 mg once daily.  · Hyperleukocytosis secondary to CML  · History of medical noncompliance  · Pulmonary embolism: Xarelto restarted  · Recent COVID-19 pneumonia  · History of prolonged QTC        Plans    · CBC reviewed with patient  · Sent for 2 units of platelets and recheck after administration  ·  Levaquin 500 mg p.o. daily x7 days and neutropenic precautions  · Recheck CBC on Friday  · Hold ponatinib, patient has been taking 15 mg-discussed with Dr. Lerma and oral chemo pharmacist  · Referral to supportive oncology due to worsening anxiety and depression, socioeconomic  issues  · Discontinue hydroxyurea due to normal platelets  · Discontinued Gleevac  · Ponatinib 45 mg p.o. daily.  She was given information on this drug to review.  Was reduced to 30 mg but patient has only been taking 15 mg while lost to follow-up.  · Continue allopurinol.  Check uric acid level.  · Check CMP and LDH  · BCR ABL kinase mutational analysis was negative  · Request [facility] records from [facility]  · Informed patient that she will be seen on a weekly basis for now  · DC trazodone 25 mg due to QT prolongation  · Monitor electrolytes closely  · Continue to follow-up with cardiology in regards to dosing of her diuretics.  · EKG reviewed.  She has slight prolongation of QT.  We will discontinue Zofran and Phenergan and use Compazine as needed for nausea.  Prescription has been sent to her pharmacy  • GI consult for continued nausea and vomiting and reported dark emesis.  She had EGD and colonoscopy coming up but they are requesting $400 upfront which she does not have.  I have asked  to investigate if there is an affordable option.  • Due to worsening anxiety increase Xanax to 0.5 mg p.o. nightly as needed number 30 pills.  Continue this for now.  Refill today.  • All questions answered  • Continue to follow-up with PCP per routine  • Follow-up with pain management per routine  • Monitor EKG-normal QT interval from EKG in April 2023  • Follow-up with NP in 1 week  • Follow-up with Dr. Lerma in 2 weeks        I spent 30 total minutes, face-to-face, caring for Hope today.  90% of this time involved counseling and/or coordination of care as documented within this note.

## 2023-05-17 ENCOUNTER — HOSPITAL ENCOUNTER (OUTPATIENT)
Dept: INFUSION THERAPY | Facility: HOSPITAL | Age: 48
Discharge: HOME | DRG: 809 | End: 2023-05-17
Admitting: NURSE PRACTITIONER
Payer: MEDICARE

## 2023-05-17 ENCOUNTER — TELEPHONE (OUTPATIENT)
Dept: ONCOLOGY | Facility: CLINIC | Age: 48
End: 2023-05-17
Payer: MEDICARE

## 2023-05-17 ENCOUNTER — OFFICE VISIT (OUTPATIENT)
Dept: ONCOLOGY | Facility: CLINIC | Age: 48
End: 2023-05-17
Payer: MEDICARE

## 2023-05-17 ENCOUNTER — APPOINTMENT (OUTPATIENT)
Dept: CT IMAGING | Facility: HOSPITAL | Age: 48
DRG: 809 | End: 2023-05-17
Payer: MEDICARE

## 2023-05-17 ENCOUNTER — LAB (OUTPATIENT)
Dept: LAB | Facility: HOSPITAL | Age: 48
DRG: 809 | End: 2023-05-17
Payer: MEDICARE

## 2023-05-17 ENCOUNTER — HOSPITAL ENCOUNTER (INPATIENT)
Facility: HOSPITAL | Age: 48
LOS: 14 days | Discharge: HOME | DRG: 809 | End: 2023-05-31
Attending: EMERGENCY MEDICINE | Admitting: HOSPITALIST
Payer: MEDICARE

## 2023-05-17 VITALS
SYSTOLIC BLOOD PRESSURE: 154 MMHG | OXYGEN SATURATION: 96 % | DIASTOLIC BLOOD PRESSURE: 78 MMHG | HEART RATE: 95 BPM | RESPIRATION RATE: 16 BRPM | TEMPERATURE: 97.9 F

## 2023-05-17 VITALS
SYSTOLIC BLOOD PRESSURE: 125 MMHG | HEART RATE: 115 BPM | DIASTOLIC BLOOD PRESSURE: 80 MMHG | HEIGHT: 64 IN | WEIGHT: 110.2 LBS | BODY MASS INDEX: 18.82 KG/M2 | OXYGEN SATURATION: 100 %

## 2023-05-17 DIAGNOSIS — F41.8 DEPRESSION WITH ANXIETY: ICD-10-CM

## 2023-05-17 DIAGNOSIS — R23.3 PETECHIAE: Primary | ICD-10-CM

## 2023-05-17 DIAGNOSIS — C92.10 CML (CHRONIC MYELOCYTIC LEUKEMIA): ICD-10-CM

## 2023-05-17 DIAGNOSIS — C92.10 CML (CHRONIC MYELOCYTIC LEUKEMIA): Primary | Chronic | ICD-10-CM

## 2023-05-17 DIAGNOSIS — I50.9 CONGESTIVE HEART FAILURE, UNSPECIFIED HF CHRONICITY, UNSPECIFIED HEART FAILURE TYPE: ICD-10-CM

## 2023-05-17 DIAGNOSIS — C92.10 CML (CHRONIC MYELOCYTIC LEUKEMIA): Chronic | ICD-10-CM

## 2023-05-17 DIAGNOSIS — D69.6 THROMBOCYTOPENIA: ICD-10-CM

## 2023-05-17 DIAGNOSIS — C92.10 CML (CHRONIC MYELOCYTIC LEUKEMIA): Primary | ICD-10-CM

## 2023-05-17 DIAGNOSIS — S06.5XAA BILATERAL SUBDURAL HEMATOMAS: ICD-10-CM

## 2023-05-17 DIAGNOSIS — F41.9 ANXIETY: ICD-10-CM

## 2023-05-17 DIAGNOSIS — G89.29 OTHER CHRONIC PAIN: ICD-10-CM

## 2023-05-17 DIAGNOSIS — C95.90 LEUKEMIA NOT HAVING ACHIEVED REMISSION, UNSPECIFIED LEUKEMIA TYPE: ICD-10-CM

## 2023-05-17 DIAGNOSIS — M54.16 LUMBAR RADICULOPATHY: ICD-10-CM

## 2023-05-17 DIAGNOSIS — C92.10 BLAST CRISIS PHASE OF CHRONIC MYELOID LEUKEMIA: Primary | ICD-10-CM

## 2023-05-17 DIAGNOSIS — D69.6 THROMBOCYTOPENIA: Primary | ICD-10-CM

## 2023-05-17 DIAGNOSIS — E86.0 DEHYDRATION: ICD-10-CM

## 2023-05-17 DIAGNOSIS — D61.818 PANCYTOPENIA: ICD-10-CM

## 2023-05-17 DIAGNOSIS — R93.0 ABNORMAL CT OF THE HEAD: ICD-10-CM

## 2023-05-17 PROBLEM — S06.5X0A TRAUMATIC SUBDURAL HEMORRHAGE WITHOUT LOSS OF CONSCIOUSNESS: Status: ACTIVE | Noted: 2023-05-17

## 2023-05-17 LAB
ALBUMIN SERPL-MCNC: 4.2 G/DL (ref 3.5–5.2)
ALBUMIN/GLOB SERPL: 1.4 G/DL
ALP SERPL-CCNC: 230 U/L (ref 39–117)
ALT SERPL W P-5'-P-CCNC: 26 U/L (ref 1–33)
ANION GAP SERPL CALCULATED.3IONS-SCNC: 14 MMOL/L (ref 5–15)
AST SERPL-CCNC: 14 U/L (ref 1–32)
BACTERIA UR QL AUTO: ABNORMAL /HPF
BASOPHILS # BLD AUTO: 0 10*3/MM3 (ref 0–0.2)
BASOPHILS NFR BLD AUTO: 0 % (ref 0–1.5)
BILIRUB SERPL-MCNC: 1.2 MG/DL (ref 0–1.2)
BILIRUB UR QL STRIP: NEGATIVE
BUN SERPL-MCNC: 35 MG/DL (ref 6–20)
BUN/CREAT SERPL: 51.5 (ref 7–25)
CALCIUM SPEC-SCNC: 9.3 MG/DL (ref 8.6–10.5)
CHLORIDE SERPL-SCNC: 103 MMOL/L (ref 98–107)
CLARITY UR: CLEAR
CO2 SERPL-SCNC: 23 MMOL/L (ref 22–29)
COLOR UR: ABNORMAL
CREAT SERPL-MCNC: 0.68 MG/DL (ref 0.57–1)
DEPRECATED RDW RBC AUTO: 51.4 FL (ref 37–54)
EGFRCR SERPLBLD CKD-EPI 2021: 108.3 ML/MIN/1.73
EOSINOPHIL # BLD AUTO: 0.01 10*3/MM3 (ref 0–0.4)
EOSINOPHIL NFR BLD AUTO: 0.4 % (ref 0.3–6.2)
ERYTHROCYTE [DISTWIDTH] IN BLOOD BY AUTOMATED COUNT: 17.9 % (ref 12.3–15.4)
GLOBULIN UR ELPH-MCNC: 2.9 GM/DL
GLUCOSE BLDC GLUCOMTR-MCNC: 387 MG/DL (ref 70–105)
GLUCOSE SERPL-MCNC: 375 MG/DL (ref 65–99)
GLUCOSE UR STRIP-MCNC: ABNORMAL MG/DL
HCT VFR BLD AUTO: 25.1 % (ref 34–46.6)
HGB BLD-MCNC: 8.6 G/DL (ref 12–15.9)
HGB UR QL STRIP.AUTO: NEGATIVE
HOLD SPECIMEN: NORMAL
HYALINE CASTS UR QL AUTO: ABNORMAL /LPF
KETONES UR QL STRIP: ABNORMAL
LDH SERPL-CCNC: 107 U/L (ref 135–214)
LEUKOCYTE ESTERASE UR QL STRIP.AUTO: NEGATIVE
LYMPHOCYTES # BLD AUTO: 2.48 10*3/MM3 (ref 0.7–3.1)
LYMPHOCYTES NFR BLD AUTO: 93.2 % (ref 19.6–45.3)
MAGNESIUM SERPL-MCNC: 1.7 MG/DL (ref 1.6–2.6)
MCH RBC QN AUTO: 28 PG (ref 26.6–33)
MCHC RBC AUTO-ENTMCNC: 34.3 G/DL (ref 31.5–35.7)
MCV RBC AUTO: 81.8 FL (ref 79–97)
MONOCYTES # BLD AUTO: 0.04 10*3/MM3 (ref 0.1–0.9)
MONOCYTES NFR BLD AUTO: 1.5 % (ref 5–12)
NEUTROPHILS NFR BLD AUTO: 0.13 10*3/MM3 (ref 1.7–7)
NEUTROPHILS NFR BLD AUTO: 4.9 % (ref 42.7–76)
NITRITE UR QL STRIP: NEGATIVE
PH UR STRIP.AUTO: 6 [PH] (ref 5–8)
PLATELET # BLD AUTO: 10 10*3/MM3 (ref 140–450)
PLATELET # BLD AUTO: 7 10*3/MM3 (ref 140–450)
PLATELET # BLD AUTO: 8 10*3/MM3 (ref 140–450)
PMV BLD AUTO: 9.9 FL (ref 6–12)
POTASSIUM SERPL-SCNC: 3.2 MMOL/L (ref 3.5–5.2)
PROT SERPL-MCNC: 7.1 G/DL (ref 6–8.5)
PROT UR QL STRIP: ABNORMAL
RBC # BLD AUTO: 3.07 10*6/MM3 (ref 3.77–5.28)
RBC # UR STRIP: ABNORMAL /HPF
REF LAB TEST METHOD: ABNORMAL
SMALL PLATELETS BLD QL SMEAR: ABNORMAL
SODIUM SERPL-SCNC: 140 MMOL/L (ref 136–145)
SP GR UR STRIP: 1.02 (ref 1–1.03)
SQUAMOUS #/AREA URNS HPF: ABNORMAL /HPF
URATE SERPL-MCNC: 5.1 MG/DL (ref 2.4–5.7)
UROBILINOGEN UR QL STRIP: ABNORMAL
WBC # UR STRIP: ABNORMAL /HPF
WBC NRBC COR # BLD: 2.66 10*3/MM3 (ref 3.4–10.8)

## 2023-05-17 PROCEDURE — 99285 EMERGENCY DEPT VISIT HI MDM: CPT

## 2023-05-17 PROCEDURE — 86901 BLOOD TYPING SEROLOGIC RH(D): CPT

## 2023-05-17 PROCEDURE — 63710000001 DIPHENHYDRAMINE PER 50 MG: Performed by: NURSE PRACTITIONER

## 2023-05-17 PROCEDURE — 85049 AUTOMATED PLATELET COUNT: CPT | Performed by: NURSE PRACTITIONER

## 2023-05-17 PROCEDURE — 83735 ASSAY OF MAGNESIUM: CPT | Performed by: INTERNAL MEDICINE

## 2023-05-17 PROCEDURE — 85025 COMPLETE CBC W/AUTO DIFF WBC: CPT

## 2023-05-17 PROCEDURE — 25010000002 DEXAMETHASONE PER 1 MG: Performed by: NURSE PRACTITIONER

## 2023-05-17 PROCEDURE — 86880 COOMBS TEST DIRECT: CPT

## 2023-05-17 PROCEDURE — 96374 THER/PROPH/DIAG INJ IV PUSH: CPT

## 2023-05-17 PROCEDURE — 82948 REAGENT STRIP/BLOOD GLUCOSE: CPT

## 2023-05-17 PROCEDURE — 70450 CT HEAD/BRAIN W/O DYE: CPT

## 2023-05-17 PROCEDURE — 81001 URINALYSIS AUTO W/SCOPE: CPT | Performed by: NURSE PRACTITIONER

## 2023-05-17 PROCEDURE — 80053 COMPREHEN METABOLIC PANEL: CPT | Performed by: INTERNAL MEDICINE

## 2023-05-17 PROCEDURE — 3E0333Z INTRODUCTION OF ANTI-INFLAMMATORY INTO PERIPHERAL VEIN, PERCUTANEOUS APPROACH: ICD-10-PCS | Performed by: INTERNAL MEDICINE

## 2023-05-17 PROCEDURE — 25010000002 DIPHENHYDRAMINE PER 50 MG: Performed by: NURSE PRACTITIONER

## 2023-05-17 PROCEDURE — P9100 PATHOGEN TEST FOR PLATELETS: HCPCS

## 2023-05-17 PROCEDURE — 85049 AUTOMATED PLATELET COUNT: CPT

## 2023-05-17 PROCEDURE — P9035 PLATELET PHERES LEUKOREDUCED: HCPCS

## 2023-05-17 PROCEDURE — 63710000001 INSULIN LISPRO (HUMAN) PER 5 UNITS: Performed by: NURSE PRACTITIONER

## 2023-05-17 PROCEDURE — 36415 COLL VENOUS BLD VENIPUNCTURE: CPT

## 2023-05-17 PROCEDURE — 83615 LACTATE (LD) (LDH) ENZYME: CPT | Performed by: INTERNAL MEDICINE

## 2023-05-17 PROCEDURE — 36430 TRANSFUSION BLD/BLD COMPNT: CPT

## 2023-05-17 PROCEDURE — 96375 TX/PRO/DX INJ NEW DRUG ADDON: CPT

## 2023-05-17 PROCEDURE — 86900 BLOOD TYPING SEROLOGIC ABO: CPT

## 2023-05-17 PROCEDURE — 96361 HYDRATE IV INFUSION ADD-ON: CPT

## 2023-05-17 PROCEDURE — 84550 ASSAY OF BLOOD/URIC ACID: CPT | Performed by: INTERNAL MEDICINE

## 2023-05-17 PROCEDURE — 96360 HYDRATION IV INFUSION INIT: CPT

## 2023-05-17 RX ORDER — LOSARTAN POTASSIUM 25 MG/1
25 TABLET ORAL DAILY
Status: DISCONTINUED | OUTPATIENT
Start: 2023-05-18 | End: 2023-05-30

## 2023-05-17 RX ORDER — SODIUM CHLORIDE 9 MG/ML
500 INJECTION, SOLUTION INTRAVENOUS ONCE
OUTPATIENT
Start: 2023-05-17

## 2023-05-17 RX ORDER — ONDANSETRON HYDROCHLORIDE 8 MG/1
8 TABLET, FILM COATED ORAL 3 TIMES DAILY PRN
COMMUNITY
Start: 2023-04-26 | End: 2023-05-17

## 2023-05-17 RX ORDER — ACETAMINOPHEN 325 MG/1
650 TABLET ORAL EVERY 4 HOURS PRN
Status: DISCONTINUED | OUTPATIENT
Start: 2023-05-17 | End: 2023-05-31 | Stop reason: HOSPADM

## 2023-05-17 RX ORDER — INSULIN LISPRO 100 [IU]/ML
2-7 INJECTION, SOLUTION INTRAVENOUS; SUBCUTANEOUS
Status: DISCONTINUED | OUTPATIENT
Start: 2023-05-17 | End: 2023-05-17

## 2023-05-17 RX ORDER — ALPRAZOLAM 0.5 MG/1
0.5 TABLET ORAL NIGHTLY PRN
Qty: 30 TABLET | Refills: 0 | Status: ON HOLD | OUTPATIENT
Start: 2023-05-17

## 2023-05-17 RX ORDER — HYDROXYZINE HYDROCHLORIDE 25 MG/1
25 TABLET, FILM COATED ORAL EVERY 8 HOURS PRN
Status: DISCONTINUED | OUTPATIENT
Start: 2023-05-17 | End: 2023-05-31 | Stop reason: HOSPADM

## 2023-05-17 RX ORDER — DIPHENHYDRAMINE HYDROCHLORIDE 50 MG/ML
25 INJECTION INTRAMUSCULAR; INTRAVENOUS ONCE
Status: COMPLETED | OUTPATIENT
Start: 2023-05-17 | End: 2023-05-17

## 2023-05-17 RX ORDER — SODIUM CHLORIDE 9 MG/ML
500 INJECTION, SOLUTION INTRAVENOUS ONCE
Status: CANCELLED | OUTPATIENT
Start: 2023-05-17

## 2023-05-17 RX ORDER — LEVOFLOXACIN 500 MG/1
500 TABLET, FILM COATED ORAL DAILY
Qty: 7 TABLET | Refills: 0 | Status: ON HOLD | OUTPATIENT
Start: 2023-05-17

## 2023-05-17 RX ORDER — ONDANSETRON 4 MG/1
4 TABLET, FILM COATED ORAL EVERY 6 HOURS PRN
Status: DISCONTINUED | OUTPATIENT
Start: 2023-05-17 | End: 2023-05-31 | Stop reason: HOSPADM

## 2023-05-17 RX ORDER — INSULIN LISPRO 100 [IU]/ML
6 INJECTION, SOLUTION INTRAVENOUS; SUBCUTANEOUS 3 TIMES DAILY PRN
Status: ON HOLD | COMMUNITY
End: 2023-05-18

## 2023-05-17 RX ORDER — BISACODYL 10 MG
10 SUPPOSITORY, RECTAL RECTAL DAILY PRN
Status: DISCONTINUED | OUTPATIENT
Start: 2023-05-17 | End: 2023-05-31 | Stop reason: HOSPADM

## 2023-05-17 RX ORDER — SODIUM CHLORIDE 9 MG/ML
40 INJECTION, SOLUTION INTRAVENOUS AS NEEDED
Status: DISCONTINUED | OUTPATIENT
Start: 2023-05-17 | End: 2023-05-31 | Stop reason: HOSPADM

## 2023-05-17 RX ORDER — ALUMINA, MAGNESIA, AND SIMETHICONE 2400; 2400; 240 MG/30ML; MG/30ML; MG/30ML
15 SUSPENSION ORAL EVERY 6 HOURS PRN
Status: DISCONTINUED | OUTPATIENT
Start: 2023-05-17 | End: 2023-05-17

## 2023-05-17 RX ORDER — CITALOPRAM 20 MG/1
10 TABLET ORAL DAILY
Status: DISCONTINUED | OUTPATIENT
Start: 2023-05-18 | End: 2023-05-31 | Stop reason: HOSPADM

## 2023-05-17 RX ORDER — METHYLPREDNISOLONE SODIUM SUCCINATE 40 MG/ML
40 INJECTION, POWDER, LYOPHILIZED, FOR SOLUTION INTRAMUSCULAR; INTRAVENOUS EVERY 6 HOURS
Status: DISCONTINUED | OUTPATIENT
Start: 2023-05-17 | End: 2023-05-17

## 2023-05-17 RX ORDER — BUDESONIDE AND FORMOTEROL FUMARATE DIHYDRATE 160; 4.5 UG/1; UG/1
2 AEROSOL RESPIRATORY (INHALATION)
Status: DISCONTINUED | OUTPATIENT
Start: 2023-05-17 | End: 2023-05-26

## 2023-05-17 RX ORDER — BISACODYL 5 MG/1
5 TABLET, DELAYED RELEASE ORAL DAILY PRN
Status: DISCONTINUED | OUTPATIENT
Start: 2023-05-17 | End: 2023-05-31 | Stop reason: HOSPADM

## 2023-05-17 RX ORDER — ACETAMINOPHEN 325 MG/1
650 TABLET ORAL ONCE
Status: SHIPPED | OUTPATIENT
Start: 2023-05-17

## 2023-05-17 RX ORDER — PROMETHAZINE HYDROCHLORIDE 25 MG/1
1 TABLET ORAL AS NEEDED
Status: ON HOLD | COMMUNITY
Start: 2023-04-26

## 2023-05-17 RX ORDER — ACETAMINOPHEN 650 MG/1
650 SUPPOSITORY RECTAL ONCE
Status: COMPLETED | OUTPATIENT
Start: 2023-05-17 | End: 2023-05-17

## 2023-05-17 RX ORDER — DIPHENHYDRAMINE HYDROCHLORIDE 50 MG/ML
25 INJECTION INTRAMUSCULAR; INTRAVENOUS ONCE
Status: DISCONTINUED | OUTPATIENT
Start: 2023-05-17 | End: 2023-05-17 | Stop reason: HOSPADM

## 2023-05-17 RX ORDER — PROCHLORPERAZINE MALEATE 5 MG/1
10 TABLET ORAL EVERY 6 HOURS PRN
Status: DISCONTINUED | OUTPATIENT
Start: 2023-05-17 | End: 2023-05-31 | Stop reason: HOSPADM

## 2023-05-17 RX ORDER — ACETAMINOPHEN 325 MG/1
650 TABLET ORAL ONCE
Status: COMPLETED | OUTPATIENT
Start: 2023-05-17 | End: 2023-05-17

## 2023-05-17 RX ORDER — DEXAMETHASONE SODIUM PHOSPHATE 4 MG/ML
8 INJECTION, SOLUTION INTRA-ARTICULAR; INTRALESIONAL; INTRAMUSCULAR; INTRAVENOUS; SOFT TISSUE ONCE
Status: COMPLETED | OUTPATIENT
Start: 2023-05-17 | End: 2023-05-17

## 2023-05-17 RX ORDER — TRAZODONE HYDROCHLORIDE 50 MG/1
25 TABLET ORAL
Status: ON HOLD | COMMUNITY
Start: 2023-04-26

## 2023-05-17 RX ORDER — DIPHENHYDRAMINE HCL 25 MG
25 CAPSULE ORAL ONCE
Status: COMPLETED | OUTPATIENT
Start: 2023-05-17 | End: 2023-05-17

## 2023-05-17 RX ORDER — SODIUM CHLORIDE 9 MG/ML
500 INJECTION, SOLUTION INTRAVENOUS ONCE
Status: COMPLETED | OUTPATIENT
Start: 2023-05-17 | End: 2023-05-17

## 2023-05-17 RX ORDER — POTASSIUM CHLORIDE 1500 MG/1
20 TABLET, FILM COATED, EXTENDED RELEASE ORAL DAILY
Qty: 90 TABLET | Refills: 0 | Status: SHIPPED | OUTPATIENT
Start: 2023-05-17 | End: 2023-05-17

## 2023-05-17 RX ORDER — SODIUM CHLORIDE 9 MG/ML
250 INJECTION, SOLUTION INTRAVENOUS AS NEEDED
Status: DISCONTINUED | OUTPATIENT
Start: 2023-05-17 | End: 2023-05-19 | Stop reason: HOSPADM

## 2023-05-17 RX ORDER — ACETAMINOPHEN 160 MG/5ML
650 SOLUTION ORAL EVERY 4 HOURS PRN
Status: DISCONTINUED | OUTPATIENT
Start: 2023-05-17 | End: 2023-05-31 | Stop reason: HOSPADM

## 2023-05-17 RX ORDER — CITALOPRAM 10 MG/1
1 TABLET ORAL DAILY
Status: ON HOLD | COMMUNITY
Start: 2023-04-26

## 2023-05-17 RX ORDER — ALPRAZOLAM 0.5 MG/1
0.5 TABLET ORAL NIGHTLY PRN
Status: DISCONTINUED | OUTPATIENT
Start: 2023-05-17 | End: 2023-05-31 | Stop reason: HOSPADM

## 2023-05-17 RX ORDER — NICOTINE POLACRILEX 4 MG
15 LOZENGE BUCCAL
Status: DISCONTINUED | OUTPATIENT
Start: 2023-05-17 | End: 2023-05-20 | Stop reason: SDUPTHER

## 2023-05-17 RX ORDER — IBUPROFEN 600 MG/1
1 TABLET ORAL
Status: DISCONTINUED | OUTPATIENT
Start: 2023-05-17 | End: 2023-05-31 | Stop reason: HOSPADM

## 2023-05-17 RX ORDER — METOPROLOL SUCCINATE 25 MG/1
25 TABLET, EXTENDED RELEASE ORAL DAILY
Status: DISCONTINUED | OUTPATIENT
Start: 2023-05-18 | End: 2023-05-31 | Stop reason: HOSPADM

## 2023-05-17 RX ORDER — LEVOFLOXACIN 500 MG/1
500 TABLET, FILM COATED ORAL DAILY
Status: COMPLETED | OUTPATIENT
Start: 2023-05-18 | End: 2023-05-24

## 2023-05-17 RX ORDER — GABAPENTIN 300 MG/1
300 CAPSULE ORAL 3 TIMES DAILY
Status: DISCONTINUED | OUTPATIENT
Start: 2023-05-17 | End: 2023-05-31 | Stop reason: HOSPADM

## 2023-05-17 RX ORDER — GABAPENTIN 300 MG/1
1 CAPSULE ORAL 3 TIMES DAILY
Status: ON HOLD | COMMUNITY
Start: 2023-05-16

## 2023-05-17 RX ORDER — ONDANSETRON 2 MG/ML
4 INJECTION INTRAMUSCULAR; INTRAVENOUS EVERY 6 HOURS PRN
Status: DISCONTINUED | OUTPATIENT
Start: 2023-05-17 | End: 2023-05-31 | Stop reason: HOSPADM

## 2023-05-17 RX ORDER — INSULIN LISPRO 100 [IU]/ML
2-7 INJECTION, SOLUTION INTRAVENOUS; SUBCUTANEOUS
Status: DISCONTINUED | OUTPATIENT
Start: 2023-05-17 | End: 2023-05-20

## 2023-05-17 RX ORDER — CHOLECALCIFEROL (VITAMIN D3) 125 MCG
5 CAPSULE ORAL NIGHTLY PRN
Status: DISCONTINUED | OUTPATIENT
Start: 2023-05-17 | End: 2023-05-31 | Stop reason: HOSPADM

## 2023-05-17 RX ORDER — POLYETHYLENE GLYCOL 3350 17 G/17G
17 POWDER, FOR SOLUTION ORAL DAILY PRN
Status: DISCONTINUED | OUTPATIENT
Start: 2023-05-17 | End: 2023-05-31 | Stop reason: HOSPADM

## 2023-05-17 RX ORDER — SODIUM CHLORIDE 0.9 % (FLUSH) 0.9 %
10 SYRINGE (ML) INJECTION EVERY 12 HOURS SCHEDULED
Status: DISCONTINUED | OUTPATIENT
Start: 2023-05-17 | End: 2023-05-31 | Stop reason: HOSPADM

## 2023-05-17 RX ORDER — MIRTAZAPINE 15 MG/1
15 TABLET, FILM COATED ORAL NIGHTLY
Status: DISCONTINUED | OUTPATIENT
Start: 2023-05-17 | End: 2023-05-31 | Stop reason: HOSPADM

## 2023-05-17 RX ORDER — TIZANIDINE 4 MG/1
1 TABLET ORAL DAILY
Status: ON HOLD | COMMUNITY
Start: 2023-05-10

## 2023-05-17 RX ORDER — SODIUM CHLORIDE 0.9 % (FLUSH) 0.9 %
10 SYRINGE (ML) INJECTION AS NEEDED
Status: DISCONTINUED | OUTPATIENT
Start: 2023-05-17 | End: 2023-05-31 | Stop reason: HOSPADM

## 2023-05-17 RX ORDER — AMOXICILLIN 250 MG
2 CAPSULE ORAL 2 TIMES DAILY
Status: DISCONTINUED | OUTPATIENT
Start: 2023-05-17 | End: 2023-05-31 | Stop reason: HOSPADM

## 2023-05-17 RX ORDER — FUROSEMIDE 40 MG/1
40 TABLET ORAL DAILY
Status: DISCONTINUED | OUTPATIENT
Start: 2023-05-18 | End: 2023-05-31 | Stop reason: HOSPADM

## 2023-05-17 RX ORDER — METHYLPREDNISOLONE SODIUM SUCCINATE 40 MG/ML
40 INJECTION, POWDER, LYOPHILIZED, FOR SOLUTION INTRAMUSCULAR; INTRAVENOUS ONCE
Status: COMPLETED | OUTPATIENT
Start: 2023-05-17 | End: 2023-05-18

## 2023-05-17 RX ORDER — DEXAMETHASONE SODIUM PHOSPHATE 4 MG/ML
8 INJECTION, SOLUTION INTRA-ARTICULAR; INTRALESIONAL; INTRAMUSCULAR; INTRAVENOUS; SOFT TISSUE ONCE
Status: DISCONTINUED | OUTPATIENT
Start: 2023-05-17 | End: 2023-05-17 | Stop reason: HOSPADM

## 2023-05-17 RX ORDER — ONDANSETRON 8 MG/1
8 TABLET, ORALLY DISINTEGRATING ORAL EVERY 8 HOURS PRN
Qty: 20 TABLET | Refills: 2 | Status: ON HOLD | OUTPATIENT
Start: 2023-05-17

## 2023-05-17 RX ORDER — ACETAMINOPHEN 160 MG/5ML
650 SOLUTION ORAL ONCE
Status: COMPLETED | OUTPATIENT
Start: 2023-05-17 | End: 2023-05-17

## 2023-05-17 RX ORDER — ALLOPURINOL 100 MG/1
5 TABLET ORAL DAILY
COMMUNITY

## 2023-05-17 RX ORDER — SODIUM CHLORIDE 9 MG/ML
250 INJECTION, SOLUTION INTRAVENOUS AS NEEDED
Status: CANCELLED | OUTPATIENT
Start: 2023-05-17

## 2023-05-17 RX ORDER — ACETAMINOPHEN 650 MG/1
650 SUPPOSITORY RECTAL EVERY 4 HOURS PRN
Status: DISCONTINUED | OUTPATIENT
Start: 2023-05-17 | End: 2023-05-31 | Stop reason: HOSPADM

## 2023-05-17 RX ORDER — DEXTROSE MONOHYDRATE 25 G/50ML
25 INJECTION, SOLUTION INTRAVENOUS
Status: DISCONTINUED | OUTPATIENT
Start: 2023-05-17 | End: 2023-05-20 | Stop reason: SDUPTHER

## 2023-05-17 RX ADMIN — ACETAMINOPHEN 650 MG: 325 TABLET, FILM COATED ORAL at 22:00

## 2023-05-17 RX ADMIN — INSULIN LISPRO 6 UNITS: 100 INJECTION, SOLUTION INTRAVENOUS; SUBCUTANEOUS at 22:50

## 2023-05-17 RX ADMIN — DIPHENHYDRAMINE HYDROCHLORIDE 25 MG: 50 INJECTION, SOLUTION INTRAMUSCULAR; INTRAVENOUS at 16:30

## 2023-05-17 RX ADMIN — ACETAMINOPHEN 650 MG: 325 TABLET, FILM COATED ORAL at 17:21

## 2023-05-17 RX ADMIN — DIPHENHYDRAMINE HYDROCHLORIDE 25 MG: 25 CAPSULE ORAL at 22:00

## 2023-05-17 RX ADMIN — DEXAMETHASONE SODIUM PHOSPHATE 8 MG: 4 INJECTION, SOLUTION INTRAMUSCULAR; INTRAVENOUS at 16:34

## 2023-05-17 RX ADMIN — SODIUM CHLORIDE 500 ML/HR: 9 INJECTION, SOLUTION INTRAVENOUS at 13:25

## 2023-05-17 RX ADMIN — Medication 10 ML: at 22:20

## 2023-05-17 NOTE — PROGRESS NOTES
Pt's family member came out of room to ask for an emesis bag. Stated the patient was nauseated and coughing. Pt was nauseated on arrival to department but the coughing began a few minutes after the rate was increased on the platelets. Vital signs within normal limits. Bilateral breathe sounds clear but diminished. The majority of the platelets infused. Some left in chamber and tubing. Coughing stopped once transfusion stopped and Dr Lerma's office called. Talked to Vanessa WORKMAN The  ordered premeds and to proceed with retesting platelet count and give second unit of platelets if needed. Called Tee in blood bank to ask if we needed to treat this as a reaction. She stated if  proceeds with orders, it's not considered a reaction.

## 2023-05-17 NOTE — Clinical Note
Level of Care: Telemetry [5]   Admitting Physician: JANES YANEZ [[id number]]   Attending Physician: JANES YANEZ [[id number]]   Bed Request Comments: jae

## 2023-05-17 NOTE — ED PROVIDER NOTES
Subjective   History of Present Illness  Chief complaint: Cough chills headache      Context: Patient is a 47-year-old female who presents to the emergency room from ambulatory care after she was receiving a infusion of platelets.  She states that she saw her oncologist today and reportedly had a low platelet count and was sent to ambulatory for 2 units of platelets.  She states she has had 12 blood transfusions before and has previously had a transfusion reaction that was much worse than what she experienced today.  She was given Benadryl and Decadron per ambulatory care and symptoms have subsided on arrival to the ER.  No noted fever in triage.  She states she received her first transfusion without any reaction and immediately after starting the second started with her symptoms.  She also states that a month ago she fell and did hit the back of her head and was not evaluated.  She had a fall yesterday where her left leg gave out while using her walker but did not strike her head.  She did recently start a new off medication a week ago  She denies any hematuria melena hematochezia.  No unilateral focal deficits      PCP: cailin Lerma             Review of Systems   Constitutional: Negative for fever.   Respiratory: Positive for cough.    Cardiovascular: Negative for chest pain.   Neurological: Positive for headaches.   Hematological: Bruises/bleeds easily.       Past Medical History:   Diagnosis Date   • Bone pain    • COVID-19 virus infection 01/12/2022   • Diabetes mellitus    • Extremity pain     Carlin. legs pain   • Leg pain     left leg greater   • Migraine    • Pulmonary embolism    • Vision loss     doing surgery       No Known Allergies    Past Surgical History:   Procedure Laterality Date   • BONE MARROW BIOPSY     • BREAST SURGERY     • BRONCHOSCOPY N/A 6/6/2022    Procedure: BRONCHOSCOPY bilateral lung washing;  Surgeon: Charlene Camara MD;  Location: [facility] ENDOSCOPY;  Service: Pulmonary;  Laterality: N/A;   post: rule out infection vs transfusion lung injury   •  SECTION     • CHOLECYSTECTOMY     • EYE SURGERY      laser surgery due  to hemmorage--- 2021-- another surgery  lt eye11/15/21   • RETINAL DETACHMENT SURGERY     • SPINE SURGERY      Lombardi spinal block   • TUBAL ABDOMINAL LIGATION         Family History   Problem Relation Age of Onset   • Diabetes Mother    • Diabetes Maternal Grandmother    • Heart attack Maternal Grandmother    • Stroke Maternal Grandmother        Social History     Socioeconomic History   • Marital status: Legally    Tobacco Use   • Smoking status: Never   • Smokeless tobacco: Never   Vaping Use   • Vaping Use: Never used   Substance and Sexual Activity   • Alcohol use: No   • Drug use: No   • Sexual activity: Defer           Objective   Physical Exam      Vital signs and triage nurse note reviewed.  Constitutional: Awake, alert; chronically ill-appearing.  Family at bedside  HEENT: Normocephalic, atraumatic;   with intact EOM; oropharynx is pink and moist without exudate or erythema.  Neck: Supple, full range of motion without pain;   Cardiovascular: Regular rate and rhythm, normal S1-S2.  Murmur  Pulmonary: Respiratory effort regular nonlabored, breath sounds clear to auscultation all fields.  Abdomen: Soft, nontender nondistended with normoactive bowel sounds; no rebound or guarding.  Musculoskeletal: Independent range of motion of all extremities   Neuro: Alert oriented x3, speech is clear and appropriate, GCS 15  Skin:  Fleshtone warm, dry, intact; bruises in various stages of healing noted to the bilateral lower extremities, petechiae noted on the feet        Procedures           ED Course      Labs Reviewed   URINALYSIS W/ MICROSCOPIC IF INDICATED (NO CULTURE) - Abnormal; Notable for the following components:       Result Value    Color, UA Dark Yellow (*)     Glucose, UA >=1000 mg/dL (3+) (*)     Ketones, UA Trace (*)     Protein, UA 30 mg/dL (1+) (*)      All other components within normal limits   URINALYSIS, MICROSCOPIC ONLY - Abnormal; Notable for the following components:    WBC, UA 0-2 (*)     Squamous Epithelial Cells, UA 3-6 (*)     All other components within normal limits   POCT GLUCOSE FINGERSTICK   POCT GLUCOSE FINGERSTICK   POCT GLUCOSE FINGERSTICK   POCT GLUCOSE FINGERSTICK   PREPARE PLATELET PHERESIS   TRANSFUSION REACTION EVALUATION    Narrative:     The following orders were created for panel order Transfusion Reaction Evaluation.  Procedure                               Abnormality         Status                     ---------                               -----------         ------                     Transfusion Reaction Blessing...[[id number]]                                                 Pathology Consultation[[id number]]                                                        Please view results for these tests on the individual orders.   TRANSFUSION REACTION EVALUATION   PATHOLOGY CONSULTATION     Medications   diphenhydrAMINE (BENADRYL) capsule 25 mg (has no administration in time range)     Or   diphenhydrAMINE (BENADRYL) injection 25 mg (has no administration in time range)   acetaminophen (TYLENOL) tablet 650 mg (has no administration in time range)     Or   acetaminophen (TYLENOL) 160 MG/5ML solution 650 mg (has no administration in time range)     Or   acetaminophen (TYLENOL) suppository 650 mg (has no administration in time range)   methylPREDNISolone sodium succinate (SOLU-Medrol) injection 40 mg (has no administration in time range)   sodium chloride 0.9 % flush 10 mL (has no administration in time range)   sodium chloride 0.9 % flush 10 mL (has no administration in time range)   sodium chloride 0.9 % infusion 40 mL (has no administration in time range)   acetaminophen (TYLENOL) tablet 650 mg (has no administration in time range)     Or   acetaminophen (TYLENOL) 160 MG/5ML solution 650 mg (has no administration in time range)     Or    acetaminophen (TYLENOL) suppository 650 mg (has no administration in time range)   aluminum-magnesium hydroxide-simethicone (MAALOX MAX) 400-400-40 MG/5ML suspension 15 mL (has no administration in time range)   sennosides-docusate (PERICOLACE) 8.6-50 MG per tablet 2 tablet (has no administration in time range)     And   polyethylene glycol (MIRALAX) packet 17 g (has no administration in time range)     And   bisacodyl (DULCOLAX) EC tablet 5 mg (has no administration in time range)     And   bisacodyl (DULCOLAX) suppository 10 mg (has no administration in time range)   ondansetron (ZOFRAN) tablet 4 mg (has no administration in time range)     Or   ondansetron (ZOFRAN) injection 4 mg (has no administration in time range)   melatonin tablet 5 mg (has no administration in time range)   dextrose (GLUTOSE) oral gel 15 g (has no administration in time range)   dextrose (D50W) (25 g/50 mL) IV injection 25 g (has no administration in time range)   glucagon (GLUCAGEN) injection 1 mg (has no administration in time range)   insulin lispro (HUMALOG/ADMELOG) injection 2-7 Units (has no administration in time range)     CT Head Without Contrast    Result Date: 5/17/2023  Bilateral subdural hygromas suggesting remote subdural hemorrhage. There is local mass effect with sulcal effacement and decreased of the size of the lateral ventricles but no evidence of herniation at this time No acute intracranial hemorrhage appreciated. Recommend follow-up, further evaluation as clinically indicated Findings were discussed with the ordering provider JP Kent at the time of interpretation Electronically Signed: Freddy Toribio  5/17/2023 7:28 PM EDT  Workstation ID: [id number]    Prior to Admission medications    Medication Sig Start Date End Date Taking? Authorizing Provider   acetaminophen (TYLENOL) 325 MG tablet Take 2 tablets by mouth Every 4 (Four) Hours As Needed. 11/17/22  Yes Provider, MD Melecio   allopurinol  (ZYLOPRIM) 100 MG tablet Take 5 tablets by mouth Daily.   Yes Melecio Almanzar MD   ALPRAZolam (Xanax) 0.5 MG tablet Take 1 tablet by mouth At Night As Needed for Anxiety. 5/17/23  Yes Denisha Gill APRN   budesonide-formoterol (SYMBICORT) 160-4.5 MCG/ACT inhaler Inhale 2 puffs 2 (Two) Times a Day. 8/5/22  Yes Villa Patel MD   citalopram (CeleXA) 10 MG tablet Take 1 tablet by mouth Daily. 4/26/23  Yes Melecio Almanzar MD   dapagliflozin Propanediol (Farxiga) 10 MG tablet Take 10 mg by mouth Daily. 3/10/23  Yes Jane Hopper MD   furosemide (LASIX) 40 MG tablet Take 1 tablet by mouth Daily. 12/19/22  Yes Melecio Almanzar MD   gabapentin (NEURONTIN) 300 MG capsule Take 1 capsule by mouth 3 (Three) Times a Day. 5/16/23  Yes Melecio Almanzar MD   hydrOXYzine (ATARAX) 25 MG tablet Take 1 tablet by mouth Every 8 (Eight) Hours As Needed. 1/4/23  Yes Melecio Almanzar MD   Insulin Glargine (BASAGLAR KWIKPEN) 100 UNIT/ML injection pen Inject 20 Units under the skin into the appropriate area as directed Daily. 7/10/22  Yes Anthony Herndon DO   Insulin Lispro (Admelog) 100 UNIT/ML injection Inject 6 Units under the skin into the appropriate area as directed 3 (Three) Times a Day As Needed for High Blood Sugar.   Yes Melecio Almanzar MD   losartan (COZAAR) 25 MG tablet Take 1 tablet by mouth Daily. 3/10/23  Yes Jane Hopper MD   metoprolol succinate XL (TOPROL-XL) 25 MG 24 hr tablet Take 1 tablet by mouth Daily. 3/10/23  Yes Jane Hopper MD   mirtazapine (REMERON) 15 MG tablet Take 1 tablet by mouth every night at bedtime. 3/24/23  Yes Denisha Gill APRN   ondansetron ODT (ZOFRAN-ODT) 8 MG disintegrating tablet Place 1 tablet on the tongue Every 8 (Eight) Hours As Needed for Nausea or Vomiting. 5/17/23  Yes Denisha Gill APRN   pain patient supplied pump by Intrathecal route Continuous. Active pump  Prescriber: Dr. Shelton - pain management   Med name/  concentration: Dilaudid   Last refill: yesterday   Lockout period: every 4 hours   Yes Melecio Almanzar MD   prochlorperazine (COMPAZINE) 10 MG tablet Take 1 tablet by mouth Every 6 (Six) Hours As Needed for Nausea or Vomiting. 3/15/23  Yes Meghann Lerma MD   promethazine (PHENERGAN) 25 MG tablet Take 1 tablet by mouth As Needed. 4/26/23  Yes Melecio Almanzar MD   tiZANidine (ZANAFLEX) 4 MG tablet Take 1 tablet by mouth Daily. 5/10/23  Yes Melecio Almanzar MD   traZODone (DESYREL) 50 MG tablet Take 25 mg by mouth every night at bedtime. 4/26/23  Yes Melecio Almanzar MD   Continuous Blood Gluc Sensor (FreeStyle Zahira 2 Sensor) misc  3/22/23   Melecio Almanzar MD   levoFLOXacin (Levaquin) 500 MG tablet Take 1 tablet by mouth Daily. 5/17/23   Denisha Gill APRN   potassium chloride (K-DUR,KLOR-CON) 20 MEQ CR tablet Take 1 tablet by mouth Daily. 3/10/23   Melecio Almanzar MD   allopurinol (ZYLOPRIM) 100 MG tablet Take 4 tablets by mouth 2 (Two) Times a Day. 3/10/23 5/17/23  Meghann Lerma MD   allopurinol (Zyloprim) 300 MG tablet Take 1 tablet by mouth Daily. 2/20/23 5/17/23  Denisha Gill APRN   ALPRAZolam (Xanax) 0.5 MG tablet Take 1 tablet by mouth At Night As Needed for Anxiety. 3/16/23 5/17/23  Meghann Lerma MD   furosemide (LASIX) 40 MG tablet Take 1 tablet by mouth 2 (Two) Times a Day for 14 days. 3/10/23 5/17/23  Jane Hopper MD   hydroxyurea (Hydrea) 500 MG capsule Take 2 capsules by mouth 2 (Two) Times a Day. 3/23/23 5/17/23  Denisha Gill APRN   Insulin Lispro (ADMELOG SOLOSTAR) 100 UNIT/ML injection pen Inject 6 Units under the skin into the appropriate area as directed 3 (Three) Times a Day. 7/9/22 5/17/23  Anthony Herndon,    ondansetron (ZOFRAN) 8 MG tablet Take 1 tablet by mouth 3 (Three) Times a Day As Needed. 4/26/23 5/17/23  Provider, MD Melecio   oxymetazoline (AFRIN) 0.05 % nasal spray   2 Spray,  "Nostrils Both, Spray, BID, 0 Refill(s) 11/17/22 5/17/23  Provider, MD Melecio   PONATinib (ICLUSIG) 15 MG chemo tablet Take 2 tablets by mouth Daily. Take with or without food.  Swallow tablets whole, do not chew, crush, or dissovle 3/31/23 5/17/23  Meghann Lerma MD   potassium chloride (K-TAB) 20 MEQ tablet controlled-release ER tablet Take 1 tablet by mouth Daily. 3/10/23 5/17/23  Jane Hopper MD   potassium chloride ER (K-TAB) 20 MEQ tablet controlled-release ER tablet Take 1 tablet by mouth Daily. 5/17/23 5/17/23  Denisha Gill APRN                                          Medical Decision Making      /79   Pulse 107   Temp 98.9 °F (37.2 °C) (Oral)   Resp 12   Ht 162.6 cm (64\")   Wt 49.9 kg (110 lb)   LMP 05/25/2022 (Approximate)   SpO2 95%   BMI 18.88 kg/m²      Chart review:3/29/23 plt 896    Radiology interpretation:  ct reviewed independently and interpreted by me: Remote subdural without herniation  Further interpretation by radiologist as above  Lab interpretation:  Labs all viewed by me and significant for,  cbc wbc 2.6, hgb 8.6, plt 7; cmp glucose 375, alk phos 230; ldh 107; mag 1.7; 11am plt 7; 1 pm 10, 1620 8             Appropriate PPE worn during exam.  Patient's labs were reviewed from oncologist office today.  She reports she received 1 unit of platelets without transfusion reaction and as soon as they started the second unit she developed chills and a headache that resolved after receiving Decadron and Benadryl.  On reexam patient is resting comfortably with family at bedside.  We discussed her concerning findings and she will be admitted for repeat dose of Decadron repeat CT in the morning and repeat labs, discussed with Roma MCCOY hospitalist for admission for continued management of care.  She remains neurologically unchanged.         i discussed findings with patient who voices understanding of admission  This document is intended for medical expert use " only. Reading of this document by patients and/or patient's family without participating medical staff guidance may result in misinterpretation and unintended morbidity.  Any interpretation of such data is the responsibility of the patient and/or family member responsible for the patient in concert with their primary or specialist providers, not to be left for sources of online searches such as [employer], [employer] or similar queries. Relying on these approaches to knowledge may result in misinterpretation, misguided goals of care and even death should patients or family members try recommendations outside of the realm of professional medical care in a supervised inpatient environment.     Discussed with Dr. Ballesteros      Abnormal CT of the head: complicated acute illness or injury  CML (chronic myelocytic leukemia): chronic illness or injury  Petechiae: complicated acute illness or injury  Thrombocytopenia: complicated acute illness or injury  Amount and/or Complexity of Data Reviewed  Radiology: ordered.      Risk  Decision regarding hospitalization.          Final diagnoses:   Petechiae   Thrombocytopenia   Abnormal CT of the head   CML (chronic myelocytic leukemia)       ED Disposition  ED Disposition     ED Disposition   Decision to Admit    Condition   --    Comment   Level of Care: Telemetry [5]   Admitting Physician: JANES YANEZ [[id number]]   Attending Physician: JANES YANEZ [[id number]]   Bed Request Comments: [facility]               No follow-up provider specified.       Medication List      ASK your doctor about these medications    Admelog 100 UNIT/ML injection  Generic drug: Insulin Lispro  Ask about: Which instructions should I use?     allopurinol 100 MG tablet  Commonly known as: ZYLOPRIM  Ask about: Which instructions should I use?     furosemide 40 MG tablet  Commonly known as: LASIX  Ask about: Which instructions should I use?             Alem Feliz, APRN  05/17/23 2122

## 2023-05-17 NOTE — TELEPHONE ENCOUNTER
----- Message from JP Varela sent at 5/17/2023  2:44 PM EDT -----  Please notify the patient that her potassium came back a bit low.  Per her medication rec list she is on 20 mEq p.o. daily.  Please confirm this dose.  And if this is correct ask her to take an additional 20 mEq today.  Thank you

## 2023-05-18 ENCOUNTER — APPOINTMENT (OUTPATIENT)
Dept: CT IMAGING | Facility: HOSPITAL | Age: 48
DRG: 809 | End: 2023-05-18
Payer: MEDICARE

## 2023-05-18 DIAGNOSIS — S06.5X0D TRAUMATIC SUBDURAL HEMORRHAGE WITHOUT LOSS OF CONSCIOUSNESS, SUBSEQUENT ENCOUNTER: Primary | ICD-10-CM

## 2023-05-18 PROBLEM — M54.16 LUMBAR RADICULOPATHY: Status: ACTIVE | Noted: 2023-05-18

## 2023-05-18 PROBLEM — S06.5XAA BILATERAL SUBDURAL HEMATOMAS: Status: ACTIVE | Noted: 2023-05-18

## 2023-05-18 LAB
ABO GROUP BLD: NORMAL
ANION GAP SERPL CALCULATED.3IONS-SCNC: 11 MMOL/L (ref 5–15)
BH BB BLOOD EXPIRATION DATE: NORMAL
BH BB BLOOD EXPIRATION DATE: NORMAL
BH BB BLOOD TYPE BARCODE: 6200
BH BB BLOOD TYPE BARCODE: NORMAL
BH BB DISPENSE STATUS: NORMAL
BH BB DISPENSE STATUS: NORMAL
BH BB PRODUCT CODE: NORMAL
BH BB PRODUCT CODE: NORMAL
BH BB UNIT NUMBER: NORMAL
BH BB UNIT NUMBER: NORMAL
BLD GP AB SCN SERPL QL: NEGATIVE
BUN SERPL-MCNC: 26 MG/DL (ref 6–20)
BUN/CREAT SERPL: 46.4 (ref 7–25)
CALCIUM SPEC-SCNC: 8.9 MG/DL (ref 8.6–10.5)
CHLORIDE SERPL-SCNC: 105 MMOL/L (ref 98–107)
CO2 SERPL-SCNC: 24 MMOL/L (ref 22–29)
CREAT SERPL-MCNC: 0.56 MG/DL (ref 0.57–1)
DEPRECATED RDW RBC AUTO: 56 FL (ref 37–54)
EGFRCR SERPLBLD CKD-EPI 2021: 113.4 ML/MIN/1.73
ERYTHROCYTE [DISTWIDTH] IN BLOOD BY AUTOMATED COUNT: 19.7 % (ref 12.3–15.4)
GLUCOSE BLDC GLUCOMTR-MCNC: 157 MG/DL (ref 70–105)
GLUCOSE BLDC GLUCOMTR-MCNC: 215 MG/DL (ref 70–105)
GLUCOSE BLDC GLUCOMTR-MCNC: 239 MG/DL (ref 70–105)
GLUCOSE BLDC GLUCOMTR-MCNC: 298 MG/DL (ref 70–105)
GLUCOSE BLDC GLUCOMTR-MCNC: 312 MG/DL (ref 70–105)
GLUCOSE SERPL-MCNC: 253 MG/DL (ref 65–99)
HCT VFR BLD AUTO: 18.7 % (ref 34–46.6)
HCT VFR BLD AUTO: 24.1 % (ref 34–46.6)
HGB BLD-MCNC: 6.8 G/DL (ref 12–15.9)
HGB BLD-MCNC: 8.1 G/DL (ref 12–15.9)
LAB AP CASE REPORT: NORMAL
LYMPHOCYTES # BLD MANUAL: 0.46 10*3/MM3 (ref 0.7–3.1)
LYMPHOCYTES NFR BLD MANUAL: 6 % (ref 5–12)
MCH RBC QN AUTO: 28.4 PG (ref 26.6–33)
MCHC RBC AUTO-ENTMCNC: 36.3 G/DL (ref 31.5–35.7)
MCV RBC AUTO: 78.5 FL (ref 79–97)
MONOCYTES # BLD: 0.04 10*3/MM3 (ref 0.1–0.9)
MYELOCYTES NFR BLD MANUAL: 2 % (ref 0–0)
NEUTROPHILS # BLD AUTO: 0.08 10*3/MM3 (ref 1.7–7)
NEUTROPHILS NFR BLD MANUAL: 13 % (ref 42.7–76)
NEUTS BAND NFR BLD MANUAL: 1 % (ref 0–5)
PATH REPORT.FINAL DX SPEC: NORMAL
PATHOLOGY REVIEW: YES
PLATELET # BLD AUTO: 7 10*3/MM3 (ref 140–450)
PMV BLD AUTO: 8.1 FL (ref 6–12)
POTASSIUM SERPL-SCNC: 3.6 MMOL/L (ref 3.5–5.2)
PROLYMPHOCYTES NFR BLD MANUAL: 2 % (ref 0–0)
RBC # BLD AUTO: 2.38 10*6/MM3 (ref 3.77–5.28)
RBC MORPH BLD: NORMAL
RH BLD: POSITIVE
SCAN SLIDE: NORMAL
SMALL PLATELETS BLD QL SMEAR: ABNORMAL
SODIUM SERPL-SCNC: 140 MMOL/L (ref 136–145)
T&S EXPIRATION DATE: NORMAL
TRANSFUSION REACTION INTERPRETATION 1: NORMAL
TRANSFUSION REACTION INTERPRETATION 2: NORMAL
UNIT  ABO: NORMAL
UNIT  ABO: NORMAL
UNIT  RH: NORMAL
UNIT  RH: NORMAL
VARIANT LYMPHS NFR BLD MANUAL: 1 % (ref 0–5)
VARIANT LYMPHS NFR BLD MANUAL: 75 % (ref 19.6–45.3)
WBC MORPH BLD: NORMAL
WBC NRBC COR # BLD: 0.6 10*3/MM3 (ref 3.4–10.8)

## 2023-05-18 PROCEDURE — 63710000001 INSULIN GLARGINE PER 5 UNITS: Performed by: NURSE PRACTITIONER

## 2023-05-18 PROCEDURE — 85018 HEMOGLOBIN: CPT | Performed by: INTERNAL MEDICINE

## 2023-05-18 PROCEDURE — 85007 BL SMEAR W/DIFF WBC COUNT: CPT | Performed by: NURSE PRACTITIONER

## 2023-05-18 PROCEDURE — P9100 PATHOGEN TEST FOR PLATELETS: HCPCS

## 2023-05-18 PROCEDURE — P9035 PLATELET PHERES LEUKOREDUCED: HCPCS

## 2023-05-18 PROCEDURE — 85027 COMPLETE CBC AUTOMATED: CPT | Performed by: INTERNAL MEDICINE

## 2023-05-18 PROCEDURE — 86850 RBC ANTIBODY SCREEN: CPT | Performed by: INTERNAL MEDICINE

## 2023-05-18 PROCEDURE — 86902 BLOOD TYPE ANTIGEN DONOR EA: CPT

## 2023-05-18 PROCEDURE — 86900 BLOOD TYPING SEROLOGIC ABO: CPT

## 2023-05-18 PROCEDURE — 86922 COMPATIBILITY TEST ANTIGLOB: CPT

## 2023-05-18 PROCEDURE — 85025 COMPLETE CBC W/AUTO DIFF WBC: CPT | Performed by: NURSE PRACTITIONER

## 2023-05-18 PROCEDURE — 86901 BLOOD TYPING SEROLOGIC RH(D): CPT | Performed by: INTERNAL MEDICINE

## 2023-05-18 PROCEDURE — 99221 1ST HOSP IP/OBS SF/LOW 40: CPT

## 2023-05-18 PROCEDURE — 36430 TRANSFUSION BLD/BLD COMPNT: CPT

## 2023-05-18 PROCEDURE — 99222 1ST HOSP IP/OBS MODERATE 55: CPT | Performed by: INTERNAL MEDICINE

## 2023-05-18 PROCEDURE — 94799 UNLISTED PULMONARY SVC/PX: CPT

## 2023-05-18 PROCEDURE — 63710000001 INSULIN LISPRO (HUMAN) PER 5 UNITS: Performed by: NURSE PRACTITIONER

## 2023-05-18 PROCEDURE — 82948 REAGENT STRIP/BLOOD GLUCOSE: CPT

## 2023-05-18 PROCEDURE — 70450 CT HEAD/BRAIN W/O DYE: CPT

## 2023-05-18 PROCEDURE — 86900 BLOOD TYPING SEROLOGIC ABO: CPT | Performed by: INTERNAL MEDICINE

## 2023-05-18 PROCEDURE — P9016 RBC LEUKOCYTES REDUCED: HCPCS

## 2023-05-18 PROCEDURE — 25010000002 METHYLPREDNISOLONE PER 40 MG: Performed by: NURSE PRACTITIONER

## 2023-05-18 PROCEDURE — 80053 COMPREHEN METABOLIC PANEL: CPT | Performed by: NURSE PRACTITIONER

## 2023-05-18 PROCEDURE — 85014 HEMATOCRIT: CPT | Performed by: INTERNAL MEDICINE

## 2023-05-18 PROCEDURE — 97162 PT EVAL MOD COMPLEX 30 MIN: CPT

## 2023-05-18 RX ADMIN — SENNOSIDES AND DOCUSATE SODIUM 2 TABLET: 50; 8.6 TABLET ORAL at 20:51

## 2023-05-18 RX ADMIN — MIRTAZAPINE 15 MG: 15 TABLET, FILM COATED ORAL at 02:13

## 2023-05-18 RX ADMIN — Medication 10 ML: at 10:05

## 2023-05-18 RX ADMIN — GABAPENTIN 300 MG: 300 CAPSULE ORAL at 20:51

## 2023-05-18 RX ADMIN — INSULIN LISPRO 2 UNITS: 100 INJECTION, SOLUTION INTRAVENOUS; SUBCUTANEOUS at 21:30

## 2023-05-18 RX ADMIN — INSULIN LISPRO 3 UNITS: 100 INJECTION, SOLUTION INTRAVENOUS; SUBCUTANEOUS at 18:33

## 2023-05-18 RX ADMIN — INSULIN GLARGINE 20 UNITS: 100 INJECTION, SOLUTION SUBCUTANEOUS at 21:30

## 2023-05-18 RX ADMIN — INSULIN GLARGINE 20 UNITS: 100 INJECTION, SOLUTION SUBCUTANEOUS at 02:13

## 2023-05-18 RX ADMIN — EMPAGLIFLOZIN 25 MG: 25 TABLET, FILM COATED ORAL at 10:04

## 2023-05-18 RX ADMIN — CITALOPRAM HYDROBROMIDE 10 MG: 20 TABLET ORAL at 10:02

## 2023-05-18 RX ADMIN — ACETAMINOPHEN 650 MG: 325 TABLET, FILM COATED ORAL at 13:01

## 2023-05-18 RX ADMIN — GABAPENTIN 300 MG: 300 CAPSULE ORAL at 18:33

## 2023-05-18 RX ADMIN — SENNOSIDES AND DOCUSATE SODIUM 2 TABLET: 50; 8.6 TABLET ORAL at 09:58

## 2023-05-18 RX ADMIN — METHYLPREDNISOLONE SODIUM SUCCINATE 40 MG: 40 INJECTION, POWDER, FOR SOLUTION INTRAMUSCULAR; INTRAVENOUS at 00:13

## 2023-05-18 RX ADMIN — ALLOPURINOL 500 MG: 300 TABLET ORAL at 10:01

## 2023-05-18 RX ADMIN — INSULIN LISPRO 4 UNITS: 100 INJECTION, SOLUTION INTRAVENOUS; SUBCUTANEOUS at 10:05

## 2023-05-18 RX ADMIN — GABAPENTIN 300 MG: 300 CAPSULE ORAL at 02:13

## 2023-05-18 RX ADMIN — FUROSEMIDE 40 MG: 40 TABLET ORAL at 10:03

## 2023-05-18 RX ADMIN — Medication 10 ML: at 21:01

## 2023-05-18 RX ADMIN — MIRTAZAPINE 15 MG: 15 TABLET, FILM COATED ORAL at 20:51

## 2023-05-18 RX ADMIN — INSULIN LISPRO 3 UNITS: 100 INJECTION, SOLUTION INTRAVENOUS; SUBCUTANEOUS at 13:01

## 2023-05-18 RX ADMIN — LOSARTAN POTASSIUM 25 MG: 25 TABLET, FILM COATED ORAL at 10:04

## 2023-05-18 RX ADMIN — METOPROLOL SUCCINATE 25 MG: 25 TABLET, EXTENDED RELEASE ORAL at 09:59

## 2023-05-18 RX ADMIN — GABAPENTIN 300 MG: 300 CAPSULE ORAL at 10:03

## 2023-05-18 RX ADMIN — LEVOFLOXACIN 500 MG: 500 TABLET, FILM COATED ORAL at 09:56

## 2023-05-18 NOTE — CONSULTS
"[facility] CONSULT NOTE    Patient name: Nieves Mc  Referring Provider: JP Richard  Reason for Consultation: Subdural hematoma    Patient Care Team:  Alida Latham APRN as PCP - General (Nurse Practitioner)  Meghann Lerma MD as Consulting Physician (Hematology and Oncology)  Hamida Feldman MD as Consulting Physician (Cardiology)  Jane Hopper MD as Consulting Physician (Interventional Cardiology)    Chief complaint: Fall with left leg weakness    Subjective .     History of present illness:    Patient is a 47 y.o.  female who presents to the hospital 5/17/2023 due to platelet transfusion reaction.  She has a history of CHF, CML for 5 years, history of pain pump insertion for back pain.  She states that she fell 3 weeks ago in which she hit her head against the wall and then fell to the floor.  She states she has fallen 2 times since then but has not hit her head.  She does not remember having syncope, dizziness, lightheadedness but states that her leg simply gave out on the left side.  She denies seizure activity.  Ever since the fall 3 weeks ago, she states that she has had weakness in her left leg.  She intermittently has pain that radiates down her left leg as well but has no sensation issues besides some intermittent remote tingling on her left fingertips.  She denies any perineal/perianal numbness or any issues going to the bathroom.  She does have chronic constipation due to medications.    Review of Systems  Review of Systems   Constitutional: Positive for activity change. Negative for fever.   Eyes: Negative for visual disturbance.   Respiratory: Negative for shortness of breath.    Gastrointestinal: Positive for constipation ( Baseline constipation due to medications).   Genitourinary: Negative for enuresis.   Musculoskeletal: Positive for gait problem (States left leg \"gives out\".). Negative for neck pain and neck stiffness.   Neurological: Positive " for weakness ( left lower extremity throughout) and numbness ( Intermittent left fingertips). Negative for dizziness, tremors, seizures, facial asymmetry, speech difficulty, light-headedness and headaches.   Psychiatric/Behavioral: Negative for confusion.       History  PAST MEDICAL HISTORY  Past Medical History:   Diagnosis Date   • Bone pain    • COVID-19 virus infection 2022   • Diabetes mellitus    • Extremity pain     Carlin. legs pain   • Leg pain     left leg greater   • Migraine    • Pulmonary embolism    • Vision loss     doing surgery       PAST SURGICAL HISTORY  Past Surgical History:   Procedure Laterality Date   • BONE MARROW BIOPSY     • BREAST SURGERY     • BRONCHOSCOPY N/A 2022    Procedure: BRONCHOSCOPY bilateral lung washing;  Surgeon: Charlene Camara MD;  Location: [facility] ENDOSCOPY;  Service: Pulmonary;  Laterality: N/A;  post: rule out infection vs transfusion lung injury   •  SECTION     • CHOLECYSTECTOMY     • EYE SURGERY      laser surgery due  to hemmorage--- 2021-- another surgery  lt eye11/15/21   • RETINAL DETACHMENT SURGERY     • SPINE SURGERY      Lombardi spinal block   • TUBAL ABDOMINAL LIGATION         FAMILY HISTORY  Family History   Problem Relation Age of Onset   • Diabetes Mother    • Diabetes Maternal Grandmother    • Heart attack Maternal Grandmother    • Stroke Maternal Grandmother        SOCIAL HISTORY  Social History     Tobacco Use   • Smoking status: Never   • Smokeless tobacco: Never   Vaping Use   • Vaping Use: Never used   Substance Use Topics   • Alcohol use: No   • Drug use: No           Allergies:  Patient has no known allergies.    MEDICATIONS:  Medications Prior to Admission   Medication Sig Dispense Refill Last Dose   • acetaminophen (TYLENOL) 325 MG tablet Take 2 tablets by mouth Every 4 (Four) Hours As Needed.      • allopurinol (ZYLOPRIM) 100 MG tablet Take 5 tablets by mouth Daily.      • ALPRAZolam (Xanax) 0.5 MG tablet Take 1 tablet by  mouth At Night As Needed for Anxiety. 30 tablet 0    • budesonide-formoterol (SYMBICORT) 160-4.5 MCG/ACT inhaler Inhale 2 puffs 2 (Two) Times a Day. 10.2 g 1    • citalopram (CeleXA) 10 MG tablet Take 1 tablet by mouth Daily.      • dapagliflozin Propanediol (Farxiga) 10 MG tablet Take 10 mg by mouth Daily. 90 tablet 3    • furosemide (LASIX) 40 MG tablet Take 1 tablet by mouth Daily.      • gabapentin (NEURONTIN) 300 MG capsule Take 1 capsule by mouth 3 (Three) Times a Day.      • hydrOXYzine (ATARAX) 25 MG tablet Take 1 tablet by mouth Every 8 (Eight) Hours As Needed.      • Insulin Glargine (BASAGLAR KWIKPEN) 100 UNIT/ML injection pen Inject 20 Units under the skin into the appropriate area as directed Daily. 10 mL 3    • Insulin Lispro (Admelog) 100 UNIT/ML injection Inject 6 Units under the skin into the appropriate area as directed 3 (Three) Times a Day As Needed for High Blood Sugar.      • losartan (COZAAR) 25 MG tablet Take 1 tablet by mouth Daily. 90 tablet 3    • metoprolol succinate XL (TOPROL-XL) 25 MG 24 hr tablet Take 1 tablet by mouth Daily. 90 tablet 3    • mirtazapine (REMERON) 15 MG tablet Take 1 tablet by mouth every night at bedtime. 30 tablet 2    • ondansetron ODT (ZOFRAN-ODT) 8 MG disintegrating tablet Place 1 tablet on the tongue Every 8 (Eight) Hours As Needed for Nausea or Vomiting. 20 tablet 2    • pain patient supplied pump by Intrathecal route Continuous. Active pump  Prescriber: Dr. Shelton - pain management   Med name/ concentration: Dilaudid   Last refill: yesterday   Lockout period: every 4 hours      • prochlorperazine (COMPAZINE) 10 MG tablet Take 1 tablet by mouth Every 6 (Six) Hours As Needed for Nausea or Vomiting. 30 tablet 1    • promethazine (PHENERGAN) 25 MG tablet Take 1 tablet by mouth As Needed.      • tiZANidine (ZANAFLEX) 4 MG tablet Take 1 tablet by mouth Daily.      • traZODone (DESYREL) 50 MG tablet Take 25 mg by mouth every night at bedtime.      • Continuous Blood  Gluc Sensor (FreeStyle Zahira 2 Sensor) misc       • levoFLOXacin (Levaquin) 500 MG tablet Take 1 tablet by mouth Daily. 7 tablet 0    • potassium chloride (K-DUR,KLOR-CON) 20 MEQ CR tablet Take 1 tablet by mouth Daily.          Objective     Results Review:  LABS:    Admission on 05/17/2023   Component Date Value Ref Range Status   • Color, UA 05/17/2023 Dark Yellow (A)  Yellow, Straw Final    Result checked     • Appearance, UA 05/17/2023 Clear  Clear Final   • pH, UA 05/17/2023 6.0  5.0 - 8.0 Final   • Specific Gravity, UA 05/17/2023 1.023  1.005 - 1.030 Final   • Glucose, UA 05/17/2023 >=1000 mg/dL (3+) (A)  Negative Final   • Ketones, UA 05/17/2023 Trace (A)  Negative Final   • Bilirubin, UA 05/17/2023 Negative  Negative Final   • Blood, UA 05/17/2023 Negative  Negative Final   • Protein, UA 05/17/2023 30 mg/dL (1+) (A)  Negative Final   • Leuk Esterase, UA 05/17/2023 Negative  Negative Final   • Nitrite, UA 05/17/2023 Negative  Negative Final   • Urobilinogen, UA 05/17/2023 1.0 E.U./dL  0.2 - 1.0 E.U./dL Final   • RBC, UA 05/17/2023 None Seen  None Seen /HPF Final   • WBC, UA 05/17/2023 0-2 (A)  None Seen /HPF Final   • Bacteria, UA 05/17/2023 None Seen  None Seen /HPF Final   • Squamous Epithelial Cells, UA 05/17/2023 3-6 (A)  None Seen, 0-2 /HPF Final   • Hyaline Casts, UA 05/17/2023 None Seen  None Seen /LPF Final   • Methodology 05/17/2023 Manual Light Microscopy   Final   • Product Code 05/18/2023 D3506V44   Final   • Unit Number 05/18/2023 [id number]   Final   • UNIT  ABO 05/18/2023 A   Final   • UNIT  RH 05/18/2023 POS   Final   • Dispense Status 05/18/2023 IS   Final   • Blood Expiration Date 05/18/2023 202305202359   Final   • Glucose 05/18/2023 253 (H)  65 - 99 mg/dL Final   • BUN 05/18/2023 26 (H)  6 - 20 mg/dL Final   • Creatinine 05/18/2023 0.56 (L)  0.57 - 1.00 mg/dL Final   • Sodium 05/18/2023 140  136 - 145 mmol/L Final   • Potassium 05/18/2023 3.6  3.5 - 5.2 mmol/L Final   • Chloride  05/18/2023 105  98 - 107 mmol/L Final   • CO2 05/18/2023 24.0  22.0 - 29.0 mmol/L Final   • Calcium 05/18/2023 8.9  8.6 - 10.5 mg/dL Final   • BUN/Creatinine Ratio 05/18/2023 46.4 (H)  7.0 - 25.0 Final   • Anion Gap 05/18/2023 11.0  5.0 - 15.0 mmol/L Final   • eGFR 05/18/2023 113.4  >60.0 mL/min/1.73 Final   • Glucose 05/17/2023 387 (H)  70 - 105 mg/dL Final    Serial Number: [id number]:  [id number]   • Glucose 05/18/2023 312 (H)  70 - 105 mg/dL Final    Serial Number: [id number]:  [id number]   • WBC 05/18/2023 0.60 (C)  3.40 - 10.80 10*3/mm3 Final   • RBC 05/18/2023 2.38 (L)  3.77 - 5.28 10*6/mm3 Final   • Hemoglobin 05/18/2023 6.8 (C)  12.0 - 15.9 g/dL Final   • Hematocrit 05/18/2023 18.7 (C)  34.0 - 46.6 % Final   • MCV 05/18/2023 78.5 (L)  79.0 - 97.0 fL Final   • MCH 05/18/2023 28.4  26.6 - 33.0 pg Final   • MCHC 05/18/2023 36.3 (H)  31.5 - 35.7 g/dL Final   • RDW 05/18/2023 19.7 (H)  12.3 - 15.4 % Final   • RDW-SD 05/18/2023 56.0 (H)  37.0 - 54.0 fl Final   • MPV 05/18/2023 8.1  6.0 - 12.0 fL Final   • Platelets 05/18/2023 7 (C)  140 - 450 10*3/mm3 Final   • Scan Slide 05/18/2023    Final    See Manual Differential Results   • Neutrophil % 05/18/2023 13.0 (L)  42.7 - 76.0 % Final   • Lymphocyte % 05/18/2023 75.0 (H)  19.6 - 45.3 % Final   • Monocyte % 05/18/2023 6.0  5.0 - 12.0 % Final   • Bands %  05/18/2023 1.0  0.0 - 5.0 % Final   • Myelocyte % 05/18/2023 2.0 (H)  0.0 - 0.0 % Final   • Atypical Lymphocyte % 05/18/2023 1.0  0.0 - 5.0 % Final   • Prolymphocyte % 05/18/2023 2.0 (H)  0.0 - 0.0 % Final   • Neutrophils Absolute 05/18/2023 0.08 (L)  1.70 - 7.00 10*3/mm3 Final   • Lymphocytes Absolute 05/18/2023 0.46 (L)  0.70 - 3.10 10*3/mm3 Final   • Monocytes Absolute 05/18/2023 0.04 (L)  0.10 - 0.90 10*3/mm3 Final   • RBC Morphology 05/18/2023 Normal  Normal Final   • WBC Morphology 05/18/2023 Normal  Normal Final   • Platelet Estimate 05/18/2023 Decreased  Normal Final   • Pathology Review  05/18/2023 Yes   Final   • Product Code 05/18/2023 [id number]   Final   • Unit Number 05/18/2023 [id number]   Final   • UNIT  ABO 05/18/2023 O   Final   • UNIT  RH 05/18/2023 NEG   Final   • Crossmatch Interpretation 05/18/2023 Compatible   Final   • Dispense Status 05/18/2023 IS   Final   • Blood Expiration Date 05/18/2023 860720393215   Final   • Blood Type Barcode 05/18/2023 9500   Final   • ABO Type 05/18/2023 A   Final   • RH type 05/18/2023 Positive   Final   • Antibody Screen 05/18/2023 Negative   Final   • T&S Expiration Date 05/18/2023 5/21/2023 11:59:59 PM   Final   • Glucose 05/18/2023 298 (H)  70 - 105 mg/dL Final    Serial Number: [id number]:  [id number]   • Glucose 05/18/2023 239 (H)  70 - 105 mg/dL Final    Serial Number: [id number]:  [id number]       DIAGNOSTICS:  CT head without contrast 5/18/2023:  Shows unchanged bilateral chronic subdural hematomas/hygromas, 7 mm in maximal thickness on right side with some mild underlying sulcal effacement.      MRI lumbar spine 11/16/2022:  Study is limited due to patient movement during exam.  Limited images do not show any significant disc bulge canal or foraminal stenosis.  There is mild L5-S1 facet arthropathy.  Incidental finding of splenomegaly and hepatomegaly.    Results Review:   I reviewed the patient's new clinical results.  I personally viewed and interpreted the patient's labs, imaging studies, chart, and medications.    Vital Signs   Temp:  [97.3 °F (36.3 °C)-98.9 °F (37.2 °C)] 98 °F (36.7 °C)  Heart Rate:  [] 92  Resp:  [12-18] 13  BP: ()/(66-91) 108/71    Physical Exam:  Physical Exam  Constitutional:       Appearance: Normal appearance.   HENT:      Head: Normocephalic and atraumatic.   Eyes:      Extraocular Movements: Extraocular movements intact.      Pupils: Pupils are equal, round, and reactive to light.      Comments: Baseline right eye amblyopia for 2 years   Pulmonary:      Effort: Pulmonary effort is normal.    Musculoskeletal:      Cervical back: Normal range of motion. No bony tenderness.      Thoracic back: No bony tenderness.      Lumbar back: No bony tenderness. Positive left straight leg raise test. Negative right straight leg raise test.   Neurological:      Mental Status: She is alert and oriented to person, place, and time.      Coordination: Finger-Nose-Finger Test and Heel to Shin Test normal.      Deep Tendon Reflexes:      Reflex Scores:       Tricep reflexes are 1+ on the right side and 1+ on the left side.       Bicep reflexes are 1+ on the right side and 1+ on the left side.       Brachioradialis reflexes are 1+ on the right side and 1+ on the left side.       Patellar reflexes are 1+ on the right side and 1+ on the left side.       Achilles reflexes are 1+ on the right side and 1+ on the left side.  Psychiatric:         Speech: Speech normal.         Behavior: Behavior is cooperative.       Neurologic Exam     Mental Status   Oriented to person, place, and time.   Attention: normal. Concentration: normal.   Speech: speech is normal   Level of consciousness: alert  Knowledge: good.     Cranial Nerves     CN II   Visual fields full to confrontation.     CN III, IV, VI   Pupils are equal, round, and reactive to light.  Right pupil: Size: 3 mm. Shape: regular. Reactivity: brisk.   Left pupil: Size: 3 mm. Shape: regular. Reactivity: brisk.   CN III: no CN III palsy  CN VI: no CN VI palsy  Nystagmus: none   Diplopia: none    CN V   Facial sensation intact.     CN VII   Facial expression full, symmetric.     CN IX, X   CN IX normal.   CN X normal.     CN XI   CN XI normal.     CN XII   CN XII normal.     Motor Exam   Right arm pronator drift: absent  Left arm pronator drift: absent  Right leg tone: normal  Left leg tone: normal    Strength   Right deltoid: 5/5  Left deltoid: 5/5  Right biceps: 5/5  Left biceps: 5/5  Right triceps: 5/5  Left triceps: 5/5  Right wrist flexion: 5/5  Left wrist flexion: 5/5  Right  wrist extension: 5/5  Left wrist extension: 5/5  Right interossei: 4/5  Left interossei: 4/5  Right abdominals: 5/5  Left abdominals: 5/5  Right iliopsoas: 5/5  Left iliopsoas: 5/5  Right quadriceps: 5/5  Left quadriceps: 4/5  Right hamstrin/5  Left hamstrin/5  Right anterior tibial: 5/5  Left anterior tibial: 4/5  Right posterior tibial: 5/5  Left posterior tibial: 4/5  Right gastroc: 5/5  Left gastroc: 4/5    Sensory Exam   Light touch normal.     Gait, Coordination, and Reflexes     Gait  Gait: (Gait deferred)    Coordination   Finger to nose coordination: normal  Heel to shin coordination: normal    Tremor   Resting tremor: absent  Intention tremor: absent  Action tremor: absent    Reflexes   Right brachioradialis: 1+  Left brachioradialis: 1+  Right biceps: 1+  Left biceps: 1+  Right triceps: 1+  Left triceps: 1+  Right patellar: 1+  Left patellar: 1+  Right achilles: 1+  Left achilles: 1+  Right Tamez: present  Left Tamez: present  Right ankle clonus: absent  Left ankle clonus: absent      Assessment & Plan       Pancytopenia    CML (chronic myelocytic leukemia)    Depression with anxiety    History of pulmonary embolism    Medically noncompliant    Type 2 diabetes mellitus with diabetic polyneuropathy, with long-term current use of insulin    NICM (nonischemic cardiomyopathy)    Chronic pain    Traumatic subdural hemorrhage without loss of consciousness    Bilateral subdural hematomas    Lumbar radiculopathy    The patient presents to the hospital s/p platelet transfusion reaction in the setting of thrombocytopenia/anemia.  She has stable bilateral subacute subdural hematomas likely from fall 3 weeks ago.  Upon examination, it appears that her current deficits of LLE weakness are unrelated to brain but rather related to lumbar spine issues.  There is concern for cervical myelopathy as she has a positive Tamez's reflex bilaterally and intermittent left fingertip tingling.  She also has  "intermittent radiating pain down left leg along with significant weakness throughout entire left lower extremity.  We will follow-up with an outpatient CT head scan in two weeks followed by an office visit.  At that time of the 2-week visit, we can make a plan for evaluation of cervical and lumbar spine issues.  Neurosurgery to sign off.  Please call for any further questions or concerns that arise.      PLAN:     Follow-up CT head 2 weeks with neurosurgery office visit  CBC/oncology managing thrombocytopenia/anemia  Neurosurgery to sign off      I discussed the patient's findings and my recommendations with patient, family, nursing staff and Dr. Patel    During patient visit, I utilized appropriate personal protective equipment including mask and gloves.  Mask used was standard procedure mask. Appropriate PPE was worn during the entire visit.  Hand hygiene was completed before and after.     Pankaj Ross, APRN  05/18/23  13:18 EDT    \"Dictated utilizing Dragon dictation\".    "

## 2023-05-18 NOTE — CONSULTS
Hematology/Oncology Inpatient Consultation    Patient name: Nieves Mc  : 1975  MRN: [id number]  Referring Provider: Dr. Eckert  Reason for Consultation: CML    Chief complaint: Severe thrombocytopenia, transfusion reaction    History of present illness:      Nieves Mc is a 47 y.o. female who presented to [facility] on 2023 with complaints of nausea, vomiting, coughing and chills during a platelet transfusion.  Past medical history of CML on ponatinib, cardiomyopathy, chronic pain, uncontrolled type 1 diabetes, chronic anemia, insomnia, anxiety and depression, pulmonary embolism on chronic anticoagulation with Xarelto, medical noncompliance.      Evaluation in the ED: Patient reported a fall 2 to 3 weeks ago during which she hit the right side of her head a CT of the head was done showing bilateral subdural hygromas suggesting remote subdural hemorrhage; local mass effect with sulcal and effacement and decreased of the size of lateral ventricles but no evidence of herniation; no acute intracranial hemorrhage.  The patient was admitted for further treatment of her pancytopenia and remote subdural hemorrhage with plans for pretreatment with IV Solu-Medrol, Tylenol and Benadryl prior to transfusing another unit of platelets.      23  Hematology/Oncology was consulted on a patient known to us and followed in the office by Dr. Lerma for her diagnosis of CML.  Patient initially presented in  when she was seen in consultation while hospitalized.  At that time the patient was on imatinib.  In  she had progressive thrombocytosis and was transition to nilotinib.  After that she was lost to follow-up until she was once again seen during a hospitalization in .  She was continued on nilotinib and hydroxyurea.  In 2022 she had once again been lost to follow-up for 3 months when she presented in the office after not taking nilotinib during that time.  She had shortness  of breath and cough for which a 2D echocardiogram was done and showed a reduced EF of 41 to 45%.  Due to cardiomyopathy she was transitioned to imatinib and hydroxyurea.  In November 2022 bone marrow biopsy showed her to be 18 accelerated phase CML and that the imatinib was not controlling her malignancy well and she was initiated on ponatinib 45 mg p.o. daily.  She had significant body pain with this dose and for that reason was reduced to 30 mg p.o. daily.  She was once again lost to follow-up for approximately 6 weeks during that time she self reduced to ponatinib 15 mg daily.  She was seen in the office on 5/17/2023 at which time her platelet count was noted to be 8000 and she was severely neutropenic.  Her ponatinib was held and she was sent to the ambulatory care unit for 2 units of platelets to be transfused and a platelet level to be checked posttransfusion for further planning.    Patient has a history of CML on ponatinib.  Patient has been noncompliant with lab evaluations as recommended.  She has been taking 50 mg of ponatinib.  She comes to the office with significant pancytopenia, severe neutropenia, platelets of 7000 and anemia.  Patient was sent to the ambulatory care for platelet transfusions, she developed chills nausea but did receive platelets.  Repeat platelet check was no significantly changed at 8000 for that reason patient was sent to the ER to be admitted to the hospital.  She has a history of falls approximately 2 weeks earlier.  While in the ER she had CT of the head which showed old subdural hematoma.  Patient denies nausea vomiting fevers or chills.  She has not been able to come to the office due to social issues, transportation issues    He/She  has a past medical history of Bone pain, COVID-19 virus infection (01/12/2022), Diabetes mellitus, Extremity pain, Leg pain, Migraine, Pulmonary embolism, and Vision loss.    PCP: Alida Latham APRN    History:  Past Medical History:    Diagnosis Date   • Bone pain    • COVID-19 virus infection 2022   • Diabetes mellitus    • Extremity pain     Carlin. legs pain   • Leg pain     left leg greater   • Migraine    • Pulmonary embolism    • Vision loss     doing surgery   ,   Past Surgical History:   Procedure Laterality Date   • BONE MARROW BIOPSY     • BREAST SURGERY     • BRONCHOSCOPY N/A 2022    Procedure: BRONCHOSCOPY bilateral lung washing;  Surgeon: Charlene Camara MD;  Location: [facility] ENDOSCOPY;  Service: Pulmonary;  Laterality: N/A;  post: rule out infection vs transfusion lung injury   •  SECTION     • CHOLECYSTECTOMY     • EYE SURGERY      laser surgery due  to hemmorage--- 2021-- another surgery  lt eye11/15/21   • RETINAL DETACHMENT SURGERY     • SPINE SURGERY      Lombardi spinal block   • TUBAL ABDOMINAL LIGATION     ,   Family History   Problem Relation Age of Onset   • Diabetes Mother    • Diabetes Maternal Grandmother    • Heart attack Maternal Grandmother    • Stroke Maternal Grandmother    ,   Social History     Tobacco Use   • Smoking status: Never   • Smokeless tobacco: Never   Vaping Use   • Vaping Use: Never used   Substance Use Topics   • Alcohol use: No   • Drug use: No   ,   Medications Prior to Admission   Medication Sig Dispense Refill Last Dose   • acetaminophen (TYLENOL) 325 MG tablet Take 2 tablets by mouth Every 4 (Four) Hours As Needed.      • allopurinol (ZYLOPRIM) 100 MG tablet Take 5 tablets by mouth Daily.      • ALPRAZolam (Xanax) 0.5 MG tablet Take 1 tablet by mouth At Night As Needed for Anxiety. 30 tablet 0    • budesonide-formoterol (SYMBICORT) 160-4.5 MCG/ACT inhaler Inhale 2 puffs 2 (Two) Times a Day. 10.2 g 1    • citalopram (CeleXA) 10 MG tablet Take 1 tablet by mouth Daily.      • dapagliflozin Propanediol (Farxiga) 10 MG tablet Take 10 mg by mouth Daily. 90 tablet 3    • furosemide (LASIX) 40 MG tablet Take 1 tablet by mouth Daily.      • gabapentin (NEURONTIN) 300 MG capsule Take 1  capsule by mouth 3 (Three) Times a Day.      • hydrOXYzine (ATARAX) 25 MG tablet Take 1 tablet by mouth Every 8 (Eight) Hours As Needed.      • Insulin Glargine (BASAGLAR KWIKPEN) 100 UNIT/ML injection pen Inject 20 Units under the skin into the appropriate area as directed Daily. 10 mL 3    • losartan (COZAAR) 25 MG tablet Take 1 tablet by mouth Daily. 90 tablet 3    • metoprolol succinate XL (TOPROL-XL) 25 MG 24 hr tablet Take 1 tablet by mouth Daily. 90 tablet 3    • mirtazapine (REMERON) 15 MG tablet Take 1 tablet by mouth every night at bedtime. 30 tablet 2    • ondansetron ODT (ZOFRAN-ODT) 8 MG disintegrating tablet Place 1 tablet on the tongue Every 8 (Eight) Hours As Needed for Nausea or Vomiting. 20 tablet 2    • pain patient supplied pump by Intrathecal route Continuous. Active pump  Prescriber: Dr. Shelton - pain management   Med name/ concentration: Dilaudid   Last refill: yesterday   Lockout period: every 4 hours      • prochlorperazine (COMPAZINE) 10 MG tablet Take 1 tablet by mouth Every 6 (Six) Hours As Needed for Nausea or Vomiting. 30 tablet 1    • promethazine (PHENERGAN) 25 MG tablet Take 1 tablet by mouth As Needed.      • tiZANidine (ZANAFLEX) 4 MG tablet Take 1 tablet by mouth Daily.      • traZODone (DESYREL) 50 MG tablet Take 25 mg by mouth every night at bedtime.      • Continuous Blood Gluc Sensor (FreeStyle Zahira 2 Sensor) misc       • levoFLOXacin (Levaquin) 500 MG tablet Take 1 tablet by mouth Daily. 7 tablet 0    • potassium chloride (K-DUR,KLOR-CON) 20 MEQ CR tablet Take 1 tablet by mouth Daily.      , Scheduled Meds:  allopurinol, 500 mg, Oral, Daily  budesonide-formoterol, 2 puff, Inhalation, BID - RT  citalopram, 10 mg, Oral, Daily  empagliflozin, 25 mg, Oral, Daily  furosemide, 40 mg, Oral, Daily  gabapentin, 300 mg, Oral, TID  insulin glargine, 20 Units, Subcutaneous, Nightly  insulin lispro, 2-7 Units, Subcutaneous, 4x Daily With Meals & Nightly  levoFLOXacin, 500 mg, Oral,  Daily  losartan, 25 mg, Oral, Daily  metoprolol succinate XL, 25 mg, Oral, Daily  mirtazapine, 15 mg, Oral, Nightly  senna-docusate sodium, 2 tablet, Oral, BID  sodium chloride, 10 mL, Intravenous, Q12H    , Continuous Infusions:  pain,     , PRN Meds:  •  acetaminophen **OR** acetaminophen **OR** acetaminophen  •  ALPRAZolam  •  senna-docusate sodium **AND** polyethylene glycol **AND** bisacodyl **AND** bisacodyl  •  Calcium Replacement - Follow Nurse / BPA Driven Protocol  •  dextrose  •  dextrose  •  glucagon (human recombinant)  •  hydrOXYzine  •  Magnesium Standard Dose Replacement - Follow Nurse / BPA Driven Protocol  •  melatonin  •  ondansetron **OR** ondansetron  •  Phosphorus Replacement - Follow Nurse / BPA Driven Protocol  •  Potassium Replacement - Follow Nurse / BPA Driven Protocol  •  prochlorperazine  •  sodium chloride  •  sodium chloride   Allergies:  Patient has no known allergies.    Subjective     ROS:  Review of Systems   Constitutional: Positive for appetite change and fatigue. Negative for chills and fever.   HENT: Negative for congestion, drooling, ear discharge, rhinorrhea, sinus pressure and tinnitus.    Eyes: Negative for photophobia, pain and discharge.   Respiratory: Negative for apnea, choking and stridor.    Cardiovascular: Negative for palpitations.   Gastrointestinal: Negative for abdominal distention, abdominal pain and anal bleeding.   Endocrine: Negative for polydipsia and polyphagia.   Genitourinary: Negative for decreased urine volume, flank pain and genital sores.   Musculoskeletal: Negative for gait problem, neck pain and neck stiffness.   Skin: Negative for color change, rash and wound.   Neurological: Positive for weakness. Negative for tremors, seizures, syncope, facial asymmetry and speech difficulty.   Hematological: Negative for adenopathy.   Psychiatric/Behavioral: Negative for agitation, confusion, hallucinations and self-injury. The patient is not hyperactive.      "    Objective   Vital Signs:   /66 (BP Location: Right arm, Patient Position: Lying)   Pulse 81   Temp 97.8 °F (36.6 °C) (Oral)   Resp 9   Ht 162.6 cm (64\")   Wt 49.9 kg (110 lb)   LMP 05/25/2022 (Approximate)   SpO2 100%   BMI 18.88 kg/m²     Physical Exam: (performed by MD)  Physical Exam  Vitals and nursing note reviewed.   Constitutional:       General: She is in acute distress.      Appearance: She is ill-appearing and toxic-appearing. She is not diaphoretic.   HENT:      Head: Normocephalic and atraumatic.   Eyes:      General: No scleral icterus.        Right eye: No discharge.         Left eye: No discharge.      Conjunctiva/sclera: Conjunctivae normal.   Neck:      Thyroid: No thyromegaly.   Cardiovascular:      Rate and Rhythm: Normal rate and regular rhythm.      Heart sounds: Normal heart sounds.     No friction rub. No gallop.   Pulmonary:      Effort: Pulmonary effort is normal. No respiratory distress.      Breath sounds: No stridor. No wheezing.   Abdominal:      General: Bowel sounds are normal.      Palpations: Abdomen is soft. There is no mass.      Tenderness: There is no abdominal tenderness. There is no guarding or rebound.   Musculoskeletal:         General: No tenderness. Normal range of motion.      Cervical back: Normal range of motion and neck supple.   Lymphadenopathy:      Cervical: No cervical adenopathy.   Skin:     General: Skin is warm.      Findings: No erythema or rash.   Neurological:      Mental Status: She is alert and oriented to person, place, and time.      Motor: No abnormal muscle tone.   Psychiatric:         Behavior: Behavior normal.         Results Review:  Lab Results (last 48 hours)     Procedure Component Value Units Date/Time    CBC & Differential [[id number]] Collected: 05/18/23 0657    Specimen: Blood Updated: 05/18/23 0704    Narrative:      The following orders were created for panel order CBC & Differential.  Procedure                               " Abnormality         Status                     ---------                               -----------         ------                     CBC Auto Differential[[id number]]                            In process                 Scan Slide[[id number]]                                       In process                   Please view results for these tests on the individual orders.    Scan Slide [[id number]] Collected: 05/18/23 0657    Specimen: Blood Updated: 05/18/23 0704    CBC Auto Differential [[id number]] Collected: 05/18/23 0657    Specimen: Blood Updated: 05/18/23 0700    Basic Metabolic Panel [[id number]]  (Abnormal) Collected: 05/18/23 0318    Specimen: Blood Updated: 05/18/23 0418     Glucose 253 mg/dL      BUN 26 mg/dL      Creatinine 0.56 mg/dL      Sodium 140 mmol/L      Potassium 3.6 mmol/L      Chloride 105 mmol/L      CO2 24.0 mmol/L      Calcium 8.9 mg/dL      BUN/Creatinine Ratio 46.4     Anion Gap 11.0 mmol/L      eGFR 113.4 mL/min/1.73     Narrative:      GFR Normal >60  Chronic Kidney Disease <60  Kidney Failure <15      POC Glucose Once [[id number]]  (Abnormal) Collected: 05/18/23 0047    Specimen: Blood Updated: 05/18/23 0048     Glucose 312 mg/dL      Comment: Serial Number: [id number]:  [id number]       POC Glucose Once [[id number]]  (Abnormal) Collected: 05/17/23 2231    Specimen: Blood Updated: 05/17/23 2233     Glucose 387 mg/dL      Comment: Serial Number: [id number]:  [id number]       Urinalysis, Microscopic Only - Urine, Clean Catch [[id number]]  (Abnormal) Collected: 05/17/23 1815    Specimen: Urine, Clean Catch Updated: 05/17/23 1917     RBC, UA None Seen /HPF      WBC, UA 0-2 /HPF      Bacteria, UA None Seen /HPF      Squamous Epithelial Cells, UA 3-6 /HPF      Hyaline Casts, UA None Seen /LPF      Methodology Manual Light Microscopy    Urinalysis With Microscopic If Indicated (No Culture) - Urine, Clean Catch [[id number]]  (Abnormal) Collected: 05/17/23 1815    Specimen: Urine, Clean  Catch Updated: 05/17/23 1830     Color, UA Dark Yellow     Comment: Result checked          Appearance, UA Clear     pH, UA 6.0     Specific Gravity, UA 1.023     Glucose, UA >=1000 mg/dL (3+)     Ketones, UA Trace     Bilirubin, UA Negative     Blood, UA Negative     Protein, UA 30 mg/dL (1+)     Leuk Esterase, UA Negative     Nitrite, UA Negative     Urobilinogen, UA 1.0 E.U./dL           Pending Results:     Imaging Reviewed:   CT Head Without Contrast    Result Date: 5/18/2023  Impression: Persistent bilateral chronic subdural hematoma/hygromas are present, 7 mm in maximal thickness on the right with some mild underlying sulcal effacement. While nonspecific and with potentially numerous etiologies, findings could be seen with intracranial hypotension or possibly remote or unreported trauma. Consider contrast-enhanced MRI when able, to further evaluate. Electronically Signed: Apolinar Byrne  5/18/2023 5:38 AM EDT  Workstation ID: [id number]    CT Head Without Contrast    Result Date: 5/17/2023  Bilateral subdural hygromas suggesting remote subdural hemorrhage. There is local mass effect with sulcal effacement and decreased of the size of the lateral ventricles but no evidence of herniation at this time No acute intracranial hemorrhage appreciated. Recommend follow-up, further evaluation as clinically indicated Findings were discussed with the ordering provider JP Kent at the time of interpretation Electronically Signed: Freddy Toribio  5/17/2023 7:28 PM EDT  Workstation ID: [id number]           Assessment & Plan   ASSESSMENT  1. Pancytopenia: Secondary to ponatinib, bone marrow depression  2. Accelerated phase CML, status post bone marrow biopsy on 11/3/2022.  Initiated on ponatinib and required dose reduction due to generalized body pain.  Was taking ponatinib 15 mg prior to hospitalization.  3. Bilateral subdural hematomas: Evaluated by neurosurgery with plan for a follow-up CT of the head in 2  weeks.  4. Multiple chronic conditions: Type 2 diabetes mellitus, nonischemic cardiomyopathy, chronic pain, depression and anxiety    PLAN  1. Hold ponatinib  2. Hold Xarelto  3. Monitor CBC and transfuse for hemoglobin less than 7.0 g/dL, platelets less than 20,000 with premedication for history of transfusion reaction    4. Continue neutropenic precautions and prophylactic Levaquin    Electronically signed by Denisha Gill, JP, 05/18/23, 7:54 AM EDT.    Thank you for this consult. We will be happy to follow along with you.     This is a 47-year-old female has a history of CML, noncompliance with treatment.    Patient has a history of CML on ponatinib.  Patient has been noncompliant with lab evaluations as recommended.  She has been taking 50 mg of ponatinib.  She comes to the office with significant pancytopenia, severe neutropenia, platelets of 7000 and anemia.  Patient was sent to the ambulatory care for platelet transfusions, she developed chills nausea but did receive platelets.  Repeat platelet check was no significantly changed at 8000 for that reason patient was sent to the ER to be admitted to the hospital.  She has a history of falls approximately 2 weeks earlier.  While in the ER she had CT of the head which showed old subdural hematoma.  Patient denies nausea vomiting fevers or chills.  She has not been able to come to the office due to social issues, transportation issues    Physical exam she appears chronically ill.  Significant exam include reduction in the size of fast splenomegaly.    I reviewed her labs, imaging studies and progress notes  She is currently admitted to the ICU  Neurosurgery has been consulted for subdural hematomas there are no planned surgeries at this time.  Patient received platelet transfusion but did not have any significant improvement in her platelet counts  This is likely due to severe bone marrow suppression from ponatinib, probably bone marrow fibrosis due to use  of poorly treated CML with very low bone marrow reserve due to her underlying malignancy.  We will await bone marrow recovery  HLA platelet typing  We will continue to follow  Continue to hold anticoagulation treatments and ponatinib    Electronically signed by Meghann Lerma MD, 05/19/23, 8:46 AM EDT.

## 2023-05-18 NOTE — THERAPY EVALUATION
Patient Name: Nieves Mc  : 1975    MRN: [id number]                              Today's Date: 2023       Admit Date: 2023    Visit Dx:     ICD-10-CM ICD-9-CM   1. Petechiae  R23.3 782.7   2. Thrombocytopenia  D69.6 287.5   3. Abnormal CT of the head  R93.0 793.0   4. 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   • Acute diastolic CHF (congestive heart failure)   • Menorrhagia   • Tumor lysis syndrome   • Acute respiratory failure with hypoxia   • Thrombocytopenia   • Hyperuricemia   • NICM (nonischemic cardiomyopathy)   • Dyspnea, unspecified type   • 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 legs pain   • Leg pain     left leg greater   • Migraine    • Pulmonary embolism    • Vision loss     doing surgery     Past Surgical History:   Procedure Laterality Date   • BONE MARROW BIOPSY     • BREAST SURGERY     • BRONCHOSCOPY N/A 2022    Procedure: BRONCHOSCOPY bilateral lung  washing;  Surgeon: Charlene Camara MD;  Location: [facility] ENDOSCOPY;  Service: Pulmonary;  Laterality: N/A;  post: rule out infection vs transfusion lung injury   •  SECTION     • CHOLECYSTECTOMY     • EYE SURGERY      laser surgery due  to hemmorage--- 2021-- another surgery  lt eye11/15/21   • RETINAL DETACHMENT SURGERY     • SPINE SURGERY      Lombardi spinal block   • TUBAL ABDOMINAL LIGATION        General Information     Row Name 23 1623          Physical Therapy Time and Intention    Document Type evaluation  -EL     Mode of Treatment physical therapy  -     Row Name 23 1623          General Information    Patient Profile Reviewed yes  -EL     Prior Level of Function independent:;all household mobility;ADL's  utilizing RWx at home  -EL     Row Name 23 1623          Living Environment    People in Home significant other;child(tobin), adult  -     Row Name 23 1623          Home Main Entrance    Number of Stairs, Main Entrance none  -EL     Row Name 23 1623          Stairs Within Home, Primary    Number of Stairs, Within Home, Primary none  -EL     Row Name 23 1623          Cognition    Orientation Status (Cognition) oriented x 4  -EL     Row Name 23 1623          Safety Issues, Functional Mobility    Impairments Affecting Function (Mobility) balance;endurance/activity tolerance;strength  -EL           User Key  (r) = Recorded By, (t) = Taken By, (c) = Cosigned By    Initials Name Provider Type    Nate Castillo PT Physical Therapist               Mobility     Row Name 23 1624          Bed Mobility    Bed Mobility supine-sit;sit-supine  -EL     Supine-Sit [place] (Bed Mobility) modified independence  -EL     Sit-Supine [place] (Bed Mobility) modified independence  -EL     Assistive Device (Bed Mobility) bed rails  -EL     Row Name 23 1624          Bed-Chair Transfer    Bed-Chair [place] (Transfers) contact guard  -EL     Comment,  (Bed-Chair Transfer) Minor LOB in standing, recovered quickly  -EL     Row Name 05/18/23 1624          Sit-Stand Transfer    Sit-Stand [place] (Transfers) contact guard  -EL           User Key  (r) = Recorded By, (t) = Taken By, (c) = Cosigned By    Initials Name Provider Type    Nate Castillo PT Physical Therapist               Obj/Interventions     Row Name 05/18/23 1625          Range of Motion Comprehensive    General Range of Motion lower extremity range of motion deficits identified  -EL     Comment, General Range of Motion AROM of LLE limited due to weakness  -EL     Row Name 05/18/23 1625          Strength Comprehensive (MMT)    General Manual Muscle Testing (MMT) Assessment lower extremity strength deficits identified  -EL     Comment, General Manual Muscle Testing (MMT) Assessment RLE 4/5 gross, LLE 2/5 gross  -EL     Row Name 05/18/23 1625          Balance    Balance Assessment sitting static balance;standing static balance;standing dynamic balance  -EL     Static Sitting Balance independent  -EL     Static Standing Balance contact guard  -EL     Dynamic Standing Balance contact guard  -EL     Row Name 05/18/23 1625          Sensory Assessment (Somatosensory)    Sensory Assessment (Somatosensory) other (see comments)  reports occasional n/t in L hand, better this date.  -EL           User Key  (r) = Recorded By, (t) = Taken By, (c) = Cosigned By    Initials Name Provider Type    Nate Castillo PT Physical Therapist               Goals/Plan     Row Name 05/18/23 1632          Bed Mobility Goal 1 (PT)    Activity/Assistive Device (Bed Mobility Goal 1, PT) bed mobility activities, all  -EL     [place] Level/Cues Needed (Bed Mobility Goal 1, PT) modified independence  -EL     Time Frame (Bed Mobility Goal 1, PT) long term goal (LTG);2 weeks  -EL     Row Name 05/18/23 1632          Transfer Goal 1 (PT)    Activity/Assistive Device (Transfer Goal 1, PT) transfers, all;walker, rolling  -EL      "[place] Level/Cues Needed (Transfer Goal 1, PT) modified independence  -EL     Time Frame (Transfer Goal 1, PT) long term goal (LTG);2 weeks  -EL     Row Name 05/18/23 1632          Gait Training Goal 1 (PT)    Activity/Assistive Device (Gait Training Goal 1, PT) gait (walking locomotion);walker, rolling  -EL     Distance (Gait Training Goal 1, PT) 100  -EL     Time Frame (Gait Training Goal 1, PT) long term goal (LTG);2 weeks  -EL     Strategies/Barriers (Gait Training Goal 1, PT) without LOB  -EL     Row Name 05/18/23 1632          Therapy Assessment/Plan (PT)    Planned Therapy Interventions (PT) neuromuscular re-education;balance training;bed mobility training;gait training;transfer training;patient/family education;strengthening  -EL           User Key  (r) = Recorded By, (t) = Taken By, (c) = Cosigned By    Initials Name Provider Type    Nate Castillo, PT Physical Therapist               Clinical Impression     Row Name 05/18/23 1623          Pain    Pretreatment Pain Rating 0/10 - no pain  -EL     Posttreatment Pain Rating 0/10 - no pain  -EL     Row Name 05/18/23 1626          Plan of Care Review    Plan of Care Reviewed With patient;significant other  -EL     Outcome Evaluation Pt is a 48 YO F admitted with low platelets and hemoglobin. Pt states she had a fall approx 1 month ago and hit R side of head. Pt with R SDH and c/o LLE weakness stating \"feels like it weighs 100 lbs\". Pt typically is independent at baseline and uses RWx for ambulation. but has required increased help from family since fall. Pt this date demonstrates LLE weakness, no reports of pain nor n/t. Pt able to stand and transfer with CGA, limited to transfers due to hgb at 6.8 recieving blood transfusion. Pt likely safe to return home wiht family assistance, but recommendation is OPPT at d/c. PT will further assess mobility as appropriate.  -EL     Row Name 05/18/23 1622          Therapy Assessment/Plan (PT)    Rehab Potential (PT) " good, to achieve stated therapy goals  -EL     Criteria for Skilled Interventions Met (PT) yes  -EL     Therapy Frequency (PT) 3 times/wk  -EL     Predicted Duration of Therapy Intervention (PT) Until d/c  -EL     Row Name 05/18/23 1626          Vital Signs    O2 Delivery Pre Treatment room air  -EL     O2 Delivery Intra Treatment room air  -EL     O2 Delivery Post Treatment room air  -EL     Pre Patient Position Supine  -EL     Intra Patient Position Standing  -EL     Post Patient Position Sitting  -EL     Row Name 05/18/23 1626          Positioning and Restraints    Pre-Treatment Position in bed  -EL     Post Treatment Position bed  -EL     In Bed notified nsg;supine;call light within reach;encouraged to call for assist;exit alarm on  -EL           User Key  (r) = Recorded By, (t) = Taken By, (c) = Cosigned By    Initials Name Provider Type    Nate Castillo PT Physical Therapist               Outcome Measures     Row Name 05/18/23 1633          How much help from another person do you currently need...    Turning from your back to your side while in flat bed without using bedrails? 3  -EL     Moving from lying on back to sitting on the side of a flat bed without bedrails? 3  -EL     Moving to and from a bed to a chair (including a wheelchair)? 3  -EL     Standing up from a chair using your arms (e.g., wheelchair, bedside chair)? 3  -EL     Climbing 3-5 steps with a railing? 2  -EL     To walk in hospital room? 2  -EL     AM-PAC 6 Clicks Score (PT) 16  -EL     Highest level of mobility 5 --> Static standing  -EL     Row Name 05/18/23 1633          Functional Assessment    Outcome Measure Options AM-PAC 6 Clicks Basic Mobility (PT)  -EL           User Key  (r) = Recorded By, (t) = Taken By, (c) = Cosigned By    Initials Name Provider Type    Nate Castillo PT Physical Therapist                             Physical Therapy Education     Title: PT OT SLP Therapies (Done)     Topic: Physical Therapy (Done)     Point:  "Mobility training (Done)     Learning Progress Summary           Patient Acceptance, E,TB, VU by NELSON at 5/18/2023 1633                   Point: Precautions (Done)     Learning Progress Summary           Patient Acceptance, E,TB, VU by EL at 5/18/2023 1633                               User Key     Initials Effective Dates Name Provider Type Discipline     06/23/20 -  Nate Brody, PT Physical Therapist PT              PT Recommendation and Plan  Planned Therapy Interventions (PT): neuromuscular re-education, balance training, bed mobility training, gait training, transfer training, patient/family education, strengthening  Plan of Care Reviewed With: patient, significant other  Outcome Evaluation: Pt is a 46 YO F admitted with low platelets and hemoglobin. Pt states she had a fall approx 1 month ago and hit R side of head. Pt with R SDH and c/o LLE weakness stating \"feels like it weighs 100 lbs\". Pt typically is independent at baseline and uses RWx for ambulation. but has required increased help from family since fall. Pt this date demonstrates LLE weakness, no reports of pain nor n/t. Pt able to stand and transfer with CGA, limited to transfers due to hgb at 6.8 recieving blood transfusion. Pt likely safe to return home wiht family assistance, but recommendation is OPPT at d/c. PT will further assess mobility as appropriate.     Time Calculation:    PT Charges     Row Name 05/18/23 1633             Time Calculation    Start Time 1425  -EL      Stop Time 1446  -EL      Time Calculation (min) 21 min  -EL      PT Received On 05/18/23  -EL      PT - Next Appointment 05/19/23  -EL      PT Goal Re-Cert Due Date 06/01/23  -            User Key  (r) = Recorded By, (t) = Taken By, (c) = Cosigned By    Initials Name Provider Type    Nate Castillo, PT Physical Therapist              Therapy Charges for Today     Code Description Service Date Service Provider Modifiers Qty    01009280335 HC PT EVAL MOD COMPLEXITY 4 5/18/2023 " Nate Brody, PT GP 1          PT G-Codes  Outcome Measure Options: AM-PAC 6 Clicks Basic Mobility (PT)  AM-PAC 6 Clicks Score (PT): 16  PT Discharge Summary  Anticipated Discharge Disposition (PT): home with 24/7 care, home with outpatient therapy services    Nate Brody, PT  5/18/2023

## 2023-05-18 NOTE — H&P
[facility] Medicine Services  History & Physical    Patient Name: Nieves Mc  : 1975  MRN: [id number]  Primary Care Physician:  Alida Latham APRN  Date of admission: 2023  Date and Time of Service: 2023 at 2030    Subjective      Chief Complaint: low platelets, platelet transfusion reaction     History of Present Illness: Nieves Mc is a 47 y.o. female with PMH of CML followed outpatient by Dr. Lerma. She was seen at the cancer center today 2023 after recent missed visits. Labwork showed pancytopenia with WBC 2.6, Hgb 8.6, platelets 7.  Patient was sent to ambulatory care for 2 units of platelets. She apparently had a reaction where she became nauseous without vomiting and started coughing and had chills. The transfusion was stopped and she was given decadron and benadryl. Her repeat platelets were 8 so it was decided to send her to the ED for hospital admission and platelet transfusion. The patient reports her petechiae on her upper chest and lower legs and inside her lips started today. She denied any shortness of breath. She has no further cough or nausea. She has been fatigued lately. She fell 2 or 3 weeks ago and hit the right side of her head. She denied any vision changes or current headache. She has been ambulating at home.     In the ED the patient had CT head that showed bilateral subdural hygromas suggesting remote subdural hemorrhage.  There is local mass effect with sulcal and effacement and decreased of the size of lateral ventricles but no evidence of herniation.  No acute intracranial hemorrhage.  Recommend follow-up, further evaluation as clinically indicated.  Patient with no neurological deficits on exam.  Vital signs stable.  This provider was under the impression that neurosurgery was notified but no consult seen in the computer. Documentation shows JP discussed case with ER physician and they agreed to contact the hospitalist for admission  for further treatment of pancytopenia and remote subdural hemorrhage.     This provider spoke with oncology Dr. Lerma who recommended pre treatment with IV solumedrol, tylenol, and benadryl prior to transfusing another unit of platelets.     Review of Systems   HENT: Negative.    Eyes: Negative.    Cardiovascular: Negative.    Respiratory: Negative.    Endocrine: Negative.    Hematologic/Lymphatic: Negative.    Skin: Negative.         bruising   Musculoskeletal: Negative.    Gastrointestinal: Negative.    Genitourinary: Negative.    Neurological: Positive for weakness.   Psychiatric/Behavioral: Negative.    Allergic/Immunologic: Negative.    All other systems reviewed and are negative.       Personal History     Past Medical History:   Diagnosis Date   • Bone pain    • COVID-19 virus infection 2022   • Diabetes mellitus    • Extremity pain     Carlin. legs pain   • Leg pain     left leg greater   • Migraine    • Pulmonary embolism    • Vision loss     doing surgery       Past Surgical History:   Procedure Laterality Date   • BONE MARROW BIOPSY     • BREAST SURGERY     • BRONCHOSCOPY N/A 2022    Procedure: BRONCHOSCOPY bilateral lung washing;  Surgeon: Charlene Camara MD;  Location: [facility] ENDOSCOPY;  Service: Pulmonary;  Laterality: N/A;  post: rule out infection vs transfusion lung injury   •  SECTION     • CHOLECYSTECTOMY     • EYE SURGERY      laser surgery due  to hemmorage--- 2021-- another surgery  lt eye11/15/21   • RETINAL DETACHMENT SURGERY     • SPINE SURGERY      Lombardi spinal block   • TUBAL ABDOMINAL LIGATION         Family History: family history includes Diabetes in her maternal grandmother and mother; Heart attack in her maternal grandmother; Stroke in her maternal grandmother. Otherwise pertinent FHx was reviewed and not pertinent to current issue.    Social History:  reports that she has never smoked. She has never used smokeless tobacco. She reports that she does not drink  alcohol and does not use drugs.    Home Medications:  Prior to Admission Medications     Prescriptions Last Dose Informant Patient Reported? Taking?    acetaminophen (TYLENOL) 325 MG tablet   Yes Yes    Take 2 tablets by mouth Every 4 (Four) Hours As Needed.    allopurinol (ZYLOPRIM) 100 MG tablet   Yes Yes    Take 5 tablets by mouth Daily.    ALPRAZolam (Xanax) 0.5 MG tablet   No Yes    Take 1 tablet by mouth At Night As Needed for Anxiety.    budesonide-formoterol (SYMBICORT) 160-4.5 MCG/ACT inhaler   No Yes    Inhale 2 puffs 2 (Two) Times a Day.    citalopram (CeleXA) 10 MG tablet   Yes Yes    Take 1 tablet by mouth Daily.    dapagliflozin Propanediol (Farxiga) 10 MG tablet   No Yes    Take 10 mg by mouth Daily.    furosemide (LASIX) 40 MG tablet   Yes Yes    Take 1 tablet by mouth Daily.    gabapentin (NEURONTIN) 300 MG capsule   Yes Yes    Take 1 capsule by mouth Daily.    hydrOXYzine (ATARAX) 25 MG tablet   Yes Yes    Take 1 tablet by mouth Every 8 (Eight) Hours As Needed.    Insulin Glargine (BASAGLAR KWIKPEN) 100 UNIT/ML injection pen   No Yes    Inject 20 Units under the skin into the appropriate area as directed Daily.    Insulin Lispro (Admelog) 100 UNIT/ML injection   Yes Yes    Inject 6 Units under the skin into the appropriate area as directed 3 (Three) Times a Day As Needed for High Blood Sugar.    losartan (COZAAR) 25 MG tablet   No Yes    Take 1 tablet by mouth Daily.    metoprolol succinate XL (TOPROL-XL) 25 MG 24 hr tablet   No Yes    Take 1 tablet by mouth Daily.    mirtazapine (REMERON) 15 MG tablet   No Yes    Take 1 tablet by mouth every night at bedtime.    ondansetron ODT (ZOFRAN-ODT) 8 MG disintegrating tablet   No Yes    Place 1 tablet on the tongue Every 8 (Eight) Hours As Needed for Nausea or Vomiting.    Continuous Blood Gluc Sensor (FreeStyle Zahira 2 Sensor) misc   Yes No    levoFLOXacin (Levaquin) 500 MG tablet   No No    Take 1 tablet by mouth Daily.    potassium chloride  (K-DUR,KLOR-CON) 20 MEQ CR tablet   Yes No    Take 1 tablet by mouth Daily.    prochlorperazine (COMPAZINE) 10 MG tablet   No No    Take 1 tablet by mouth Every 6 (Six) Hours As Needed for Nausea or Vomiting.    promethazine (PHENERGAN) 25 MG tablet   Yes No    Take 1 tablet by mouth As Needed.    tiZANidine (ZANAFLEX) 4 MG tablet   Yes No    Take 1 tablet by mouth Daily.    traZODone (DESYREL) 50 MG tablet   Yes No    Take 25 mg by mouth every night at bedtime.            Allergies:  No Known Allergies    Objective      Vitals:   Temp:  [97.9 °F (36.6 °C)-98.9 °F (37.2 °C)] 98.9 °F (37.2 °C)  Heart Rate:  [] 100  Resp:  [12-18] 16  BP: (110-159)/(73-87) 137/79    Physical Exam  Vitals and nursing note reviewed.   HENT:      Head: Normocephalic and atraumatic.      Mouth/Throat:      Comments: Mouth sores  Eyes:      Extraocular Movements: Extraocular movements intact.      Pupils: Pupils are equal, round, and reactive to light.   Cardiovascular:      Rate and Rhythm: Normal rate and regular rhythm.      Pulses: Normal pulses.      Heart sounds: Normal heart sounds.   Pulmonary:      Effort: Pulmonary effort is normal.      Breath sounds: Normal breath sounds.   Abdominal:      General: Bowel sounds are normal.      Palpations: Abdomen is soft.      Tenderness: There is no abdominal tenderness.   Musculoskeletal:         General: Normal range of motion.   Skin:     General: Skin is warm and dry.      Findings: Bruising present.   Neurological:      Mental Status: She is alert and oriented to person, place, and time.   Psychiatric:         Mood and Affect: Mood normal.         Behavior: Behavior normal.          Result Review    Result Review:  I have personally reviewed the results from the time of this admission to 5/17/2023 22:29 EDT and agree with these findings:  [x]  Laboratory  []  Microbiology  [x]  Radiology  []  EKG/Telemetry   []  Cardiology/Vascular   []  Pathology  [x]  Old records      Assessment  & Plan        Active Hospital Problems:  Active Hospital Problems    Diagnosis    • **Pancytopenia    • Traumatic subdural hemorrhage without loss of consciousness    • Chronic pain    • NICM (nonischemic cardiomyopathy)    • Type 2 diabetes mellitus with diabetic polyneuropathy, with long-term current use of insulin    • Depression with anxiety    • History of pulmonary embolism    • Medically noncompliant    • CML (chronic myelocytic leukemia)      Plan:     Pancytopenia  -WBC 2.6, Hgb 8.6, platelets 7  -received 1 unit of platelets in ambulatory care and developed a cough, chills, and nausea. Transfusion was stopped and patient was given IV decadron and bendadryl and sent to the ER  -plts now 8  -spoke with oncology who recommended pretreatment with IV solumedrol, IV benadryl, and tylenol prior to another unit of platelets  -start levaquin 500mg daily x7 days for prophylaxis per oncology notes  -neutropenic precautions  -oncology consulted     Remote subdural hemorrhage  -CT head: bilateral subdural hygromas suggesting remote subdural hemorrhage.  There is local mass effect with sulcal and effacement and decreased of the size of lateral ventricles but no evidence of herniation.  No acute intracranial hemorrhage.  -pt reports a fall 2-3 weeks ago per patient  -no neurological deficits on exam  -continue neuro checks   -consult neurosurgery   -repeat CT head in am    CML  -per oncology notes no longer on Gleevec or hydroxyurea currently on Ponatinib 15mg followed     DM type 2  -glucose 375 at 11am  -check glucose AC/HS  -cont home lantus, premeal insulin, and SSI    Nonischemic Cardiomyopathy EF 44%  -cont home GDMT: BB, ARB, SGTL2, lasix    Chronic pain  -pain pump in place, managed by pain management outpatient. Pt just had pump adjusted yesterday     Anxiety  -cont home xanax      DVT prophylaxis: contraindicated due to plts of 8    CODE STATUS:    Level Of Support Discussed With: Patient  Code Status (Patient  has no pulse and is not breathing): CPR (Attempt to Resuscitate)  Medical Interventions (Patient has pulse or is breathing): Full Support    Admission Status:  I believe this patient meets inpatient status.    I discussed the patient's findings and my recommendations with patient, family and nursing staff.    This patient has been examined wearing appropriate Personal Protective Equipment. 05/17/23      Electronically signed by JP Richard, 05/17/23, 10:30 PM EDT.

## 2023-05-18 NOTE — CASE MANAGEMENT/SOCIAL WORK
Discharge Planning Assessment  [facility]     Patient Name: Nieves Mc  MRN: [id number]  Today's Date: 5/18/2023    Admit Date: 5/17/2023    Plan: DC Plan: Pending Clinical Course and PT/OT evaluations. From home with mother.   Discharge Needs Assessment     Row Name 05/18/23 4309       Living Environment    People in Home significant other;child(tobin), adult;parent(s)    Name(s) of People in Home Mother Sayra Rousseau    Current Living Arrangements home    Potentially Unsafe Housing Conditions none    Provides Primary Care For no one, unable/limited ability to care for self    Family Caregiver if Needed significant other;parent(s)    Family Caregiver Names Mother Sayra and Adult Children    Quality of Family Relationships helpful;involved;supportive    Able to Return to Prior Arrangements yes       Resource/Environmental Concerns    Resource/Environmental Concerns none    Transportation Concerns none       Food Insecurity    Within the past 12 months, you worried that your food would run out before you got the money to buy more. Never true    Within the past 12 months, the food you bought just didn't last and you didn't have money to get more. Never true       Discharge Needs Assessment    Readmission Within the Last 30 Days no previous admission in last 30 days    Equipment Currently Used at Home walker, rolling;wheelchair    Concerns to be Addressed discharge planning    Anticipated Changes Related to Illness none    Equipment Needed After Discharge other (see comments)  CM will continue to monitor for needs    Provided Post Acute Provider List? N/A    Provided Post Acute Provider Quality & Resource List? N/A    Current Discharge Risk physical impairment;chronically ill               Discharge Plan     Row Name 05/18/23 8495       Plan    Plan DC Plan: Pending Clinical Course and PT/OT evaluations. From home with mother.    Provided Post Acute Provider List? N/A    Provided Post Acute Provider Quality &  Resource List? N/A    Plan Comments CM spoke with patient at bedside to discuss admission assessment and discharge planning. Patient confirms PCP and pharmacy. Patient already enrolled in meds to bed program.Patient denies any difficulty affording medications at this time. Patient denies any additional needs for services or DME at this time. Patient states she currently lives with her mother, but will be going home with her children secondary to frequent appoinments needed and distance from her mothers house. Patient states her mother or an Uber will take her home when discharged.CM reviewed chart documentation to obtain clinical updates. CM will continue to follow for any further needs and adjust discharge plan accordingly. DC Barriers: O2@2L nc, abnormal labs, PRBC, monitor H/H, CT Head, Neurosurgery consult, and Hem/Onc consult.           Expected Discharge Date and Time     Expected Discharge Date Expected Discharge Time    May 24, 2023          Demographic Summary     Row Name 05/18/23 1642       General Information    Admission Type inpatient    Arrived From emergency department;home    Required Notices Provided Important Message from Medicare    Referral Source admission list    Reason for Consult discharge planning    Preferred Language English       Contact Information    Permission Granted to Share Info With                Functional Status     Row Name 05/18/23 1643       Functional Status    Usual Activity Tolerance moderate    Current Activity Tolerance moderate       Physical Activity    On average, how many days per week do you engage in moderate to strenuous exercise (like a brisk walk)? 0 days    On average, how many minutes do you engage in exercise at this level? 0 min    Number of minutes of exercise per week 0       Functional Status, IADL    Medications independent    Meal Preparation independent    Housekeeping independent    Laundry independent    Shopping independent       Mental  Status    General Appearance WDL WDL       Mental Status Summary    Recent Changes in Mental Status/Cognitive Functioning no changes       Employment/    Employment Status unemployed    Current or Previous Occupation not applicable           Current or Previous  Service none           Mayela Rudolph RN     Office Phone: [phone]  Office Cell:     [phone]

## 2023-05-18 NOTE — PROGRESS NOTES
[facility] Medicine Services   Daily Progress Note    Patient Name: Nieves Mc  : 1975  MRN: [id number]  Primary Care Physician:  Alida Latham APRN  Date of admission: 2023  Date and Time of Service: 2023     Subjective      Chief Complaint: Left leg heaviness, low platelets, transfusion of platelet transfusion as outpatient, for 3 weeks back.      Patient Reports this patient has history of CML, she is getting chemotherapy at home, she has not been very good at compliant and blood check as outpatient, she fell 3 weeks back and she reported that since then she has lower back pain and she felt heaviness on her left leg, patient was found to have what appears to be old subdural hematoma and neurosurgery are consulted.  Also patient had pancytopenia she received blood transfusion today and platelet transfusion also consult hematology oncology.    ROS all review of systems are negative except mentioned above      Objective      Vitals:   Temp:  [97.3 °F (36.3 °C)-98.9 °F (37.2 °C)] 98 °F (36.7 °C)  Heart Rate:  [] 92  Resp:  [12-18] 13  BP: ()/(66-91) 108/71  Flow (L/min):  [2] 2    Physical Exam  Constitutional:       Appearance: Normal appearance.   HENT:      Head: Normocephalic and atraumatic.      Right Ear: Tympanic membrane normal.      Left Ear: Tympanic membrane normal.      Nose: Nose normal.      Mouth/Throat:      Mouth: Mucous membranes are moist.   Eyes:      Extraocular Movements: Extraocular movements intact.      Pupils: Pupils are equal, round, and reactive to light.   Cardiovascular:      Rate and Rhythm: Normal rate.      Pulses: Normal pulses.   Pulmonary:      Effort: Pulmonary effort is normal.   Abdominal:      General: Abdomen is flat.   Musculoskeletal:         General: Normal range of motion.      Cervical back: Normal range of motion.   Skin:     General: Skin is warm.      Capillary Refill: Capillary refill takes less than 2 seconds.    Neurological:      General: No focal deficit present.      Mental Status: She is alert.   Psychiatric:         Mood and Affect: Mood normal.             Result Review    Result Review:  I have personally reviewed the results from the time of this admission to 5/18/2023 13:42 EDT and agree with these findings:  [x]  Laboratory  []  Microbiology  []  Radiology  []  EKG/Telemetry   []  Cardiology/Vascular   []  Pathology  []  Old records  []  Other:  Most notable findings include: Glucose 239, sodium 140, potassium 3.6, bicarbonate 24, chloride 105, creatinine 0.56, BUN 26.    Vital cell count 0.6, hemoglobin 6.8, [place] 18.7, and platelets 7.  Wounds (last 24 hours)     LDA Wound     Row Name 05/18/23 0400 05/18/23 0000          Rash 05/18/23 0000 Right anterior foot other (see comments)    Rash - Properties Group Placement Date: 05/18/23  -TM Placement Time: 0000  -TM Side: Right  -TM Orientation: anterior  -TM Location: foot  -TM Type: other (see comments)  -TM Additional Comments: petechial  -TM    Distribution localized  -TM localized  -TM     Color -- red  -TM     Configuration/Shape -- symmetrical;round;pinpoint  -TM     Borders distinct  -TM distinct  -TM     Characteristics flat  -TM flat  -TM     Lesion Size pinpoint  -TM pinpoint  -TM     Care, Rash -- open to air  -TM     Retired Rash - Properties Group Placement Date: 05/18/23  -TM Placement Time: 0000  -TM Side: Right  -TM Orientation: anterior  -TM Location: foot  -TM Type: other (see comments)  -TM Additional Comments: petechial  -TM    Retired Rash - Properties Group Date first assessed: 05/18/23  -TM Time first assessed: 0000  -TM Side: Right  -TM Orientation: anterior  -TM Location: foot  -TM Type: other (see comments)  -TM Additional Comments: petechial  -TM       Rash 05/18/23 0000 Left anterior foot other (see comments)    Rash - Properties Group Placement Date: 05/18/23  -TM Placement Time: 0000  -TM Side: Left  -TM Orientation: anterior  -TM  Location: foot  -TM Type: other (see comments)  -TM Additional Comments: petichial  -TM    Distribution localized  -TM localized  -TM     Color -- red  -TM     Configuration/Shape -- symmetrical;round  -TM     Borders distinct  -TM distinct  -TM     Characteristics flat  -TM flat  -TM     Lesion Size less than 0.5 cm  -TM less than 0.5 cm  -TM     Retired Rash - Properties Group Placement Date: 05/18/23  -TM Placement Time: 0000  -TM Side: Left  -TM Orientation: anterior  -TM Location: foot  -TM Type: other (see comments)  -TM Additional Comments: petichial  -TM    Retired Rash - Properties Group Date first assessed: 05/18/23  -TM Time first assessed: 0000  -TM Side: Left  -TM Orientation: anterior  -TM Location: foot  -TM Type: other (see comments)  -TM Additional Comments: petichial  -TM       Rash 05/18/23 0000 lip other (see comments)    Rash - Properties Group Placement Date: 05/18/23  -TM Placement Time: 0000  -TM Location: lip  -TM Type: other (see comments)  -TM Additional Comments: petechial  -TM    Distribution localized  -TM localized  -TM     Color -- purple;black  -TM     Configuration/Shape -- asymmetric  -TM     Borders active  -TM active  -TM     Characteristics flat  -TM flat  -TM     Lesion Size 0.5 to 1 cm  -TM 0.5 to 1 cm  -TM     Retired Rash - Properties Group Placement Date: 05/18/23  -TM Placement Time: 0000  -TM Location: lip  -TM Type: other (see comments)  -TM Additional Comments: petechial  -TM    Retired Rash - Properties Group Date first assessed: 05/18/23  -TM Time first assessed: 0000  -TM Location: lip  -TM Type: other (see comments)  -TM Additional Comments: petechial  -TM          User Key  (r) = Recorded By, (t) = Taken By, (c) = Cosigned By    Initials Name Provider Type    TM Cyn العلي RN Registered Nurse                  Assessment & Plan      allopurinol, 500 mg, Oral, Daily  budesonide-formoterol, 2 puff, Inhalation, BID - RT  citalopram, 10 mg, Oral,  Daily  empagliflozin, 25 mg, Oral, Daily  furosemide, 40 mg, Oral, Daily  gabapentin, 300 mg, Oral, TID  insulin glargine, 20 Units, Subcutaneous, Nightly  insulin lispro, 2-7 Units, Subcutaneous, 4x Daily With Meals & Nightly  levoFLOXacin, 500 mg, Oral, Daily  losartan, 25 mg, Oral, Daily  metoprolol succinate XL, 25 mg, Oral, Daily  mirtazapine, 15 mg, Oral, Nightly  senna-docusate sodium, 2 tablet, Oral, BID  sodium chloride, 10 mL, Intravenous, Q12H       pain,          Active Hospital Problems:  Active Hospital Problems    Diagnosis    • **Pancytopenia    • Bilateral subdural hematomas    • Lumbar radiculopathy    • Traumatic subdural hemorrhage without loss of consciousness    • Chronic pain    • NICM (nonischemic cardiomyopathy)    • Type 2 diabetes mellitus with diabetic polyneuropathy, with long-term current use of insulin    • Depression with anxiety    • History of pulmonary embolism    • Medically noncompliant    • CML (chronic myelocytic leukemia)      Plan:   Pancytopenia  -WBC 2.6, Hgb 8.6, platelets 7  -received 1 unit of platelets in ambulatory care and developed a cough, chills, and nausea. Transfusion was stopped and patient was given IV decadron and bendadryl and sent to the ER  -plts now 8  -Solu-Medrol is given by recommendation of oncology  -start levaquin 500mg daily x7 days for prophylaxis per oncology notes  -neutropenic precautions  -oncology consulted      Remote subdural hemorrhage  -CT head: bilateral subdural hygromas suggesting remote subdural hemorrhage.    -Neurosurgery was consulted    CML  -per oncology notes no longer on Gleevec or hydroxyurea currently on Ponatinib 15mg followed      DM type 2  -glucose 375 at 11am  -check glucose AC/HS  -cont home lantus, premeal insulin, and SSI     Nonischemic Cardiomyopathy EF 44%  -cont home GDMT: BB, ARB, SGTL2, lasix     Chronic pain  -pain pump in place, managed by pain management outpatient. Pt just had pump adjusted yesterday       Anxiety  -cont home xanax    DVT prophylaxis:  Mechanical DVT prophylaxis orders are present.    CODE STATUS:    Level Of Support Discussed With: Patient  Code Status (Patient has no pulse and is not breathing): CPR (Attempt to Resuscitate)  Medical Interventions (Patient has pulse or is breathing): Full Support      Disposition:  I expect patient to be discharged 3 days.    This patient has been examined wearing appropriate Personal Protective Equipment and discussed with hospital infection control department. 05/18/23      Electronically signed by Wagner Eckert MD, 05/18/23, 13:42 EDT.  [facility] Hospitalist Team

## 2023-05-18 NOTE — PLAN OF CARE
"Goal Outcome Evaluation:  Plan of Care Reviewed With: patient, significant other           Outcome Evaluation: Pt is a 48 YO F admitted with low platelets and hemoglobin. Pt states she had a fall approx 1 month ago and hit R side of head. Pt with R SDH and c/o LLE weakness stating \"feels like it weighs 100 lbs\". Pt typically is independent at baseline and uses RWx for ambulation. but has required increased help from family since fall. Pt this date demonstrates LLE weakness, no reports of pain nor n/t. Pt able to stand and transfer with CGA, limited to transfers due to hgb at 6.8 recieving blood transfusion. Pt likely safe to return home wiht family assistance, but recommendation is OPPT at d/c. PT will further assess mobility as appropriate.         "

## 2023-05-19 ENCOUNTER — TELEPHONE (OUTPATIENT)
Dept: NEUROSURGERY | Facility: CLINIC | Age: 48
End: 2023-05-19
Payer: MEDICARE

## 2023-05-19 LAB
ALBUMIN SERPL-MCNC: 3.8 G/DL (ref 3.5–5.2)
ALBUMIN/GLOB SERPL: 1.4 G/DL
ALP SERPL-CCNC: 193 U/L (ref 39–117)
ALT SERPL W P-5'-P-CCNC: 21 U/L (ref 1–33)
ANION GAP SERPL CALCULATED.3IONS-SCNC: 12 MMOL/L (ref 5–15)
AST SERPL-CCNC: 13 U/L (ref 1–32)
BH BB BLOOD EXPIRATION DATE: NORMAL
BH BB BLOOD TYPE BARCODE: 9500
BH BB DISPENSE STATUS: NORMAL
BH BB PRODUCT CODE: NORMAL
BH BB UNIT NUMBER: NORMAL
BILIRUB SERPL-MCNC: 1.4 MG/DL (ref 0–1.2)
BUN SERPL-MCNC: 22 MG/DL (ref 6–20)
BUN/CREAT SERPL: 43.1 (ref 7–25)
CALCIUM SPEC-SCNC: 9.1 MG/DL (ref 8.6–10.5)
CHLORIDE SERPL-SCNC: 106 MMOL/L (ref 98–107)
CO2 SERPL-SCNC: 27 MMOL/L (ref 22–29)
CREAT SERPL-MCNC: 0.51 MG/DL (ref 0.57–1)
CROSSMATCH INTERPRETATION: NORMAL
DEPRECATED RDW RBC AUTO: 55.6 FL (ref 37–54)
EGFRCR SERPLBLD CKD-EPI 2021: 116 ML/MIN/1.73
ERYTHROCYTE [DISTWIDTH] IN BLOOD BY AUTOMATED COUNT: 19 % (ref 12.3–15.4)
GLOBULIN UR ELPH-MCNC: 2.8 GM/DL
GLUCOSE BLDC GLUCOMTR-MCNC: 162 MG/DL (ref 70–105)
GLUCOSE BLDC GLUCOMTR-MCNC: 162 MG/DL (ref 70–105)
GLUCOSE BLDC GLUCOMTR-MCNC: 396 MG/DL (ref 70–105)
GLUCOSE BLDC GLUCOMTR-MCNC: 421 MG/DL (ref 70–105)
GLUCOSE BLDC GLUCOMTR-MCNC: 94 MG/DL (ref 70–105)
GLUCOSE SERPL-MCNC: 71 MG/DL (ref 65–99)
HCT VFR BLD AUTO: 25.3 % (ref 34–46.6)
HGB BLD-MCNC: 8.8 G/DL (ref 12–15.9)
LAB AP CASE REPORT: NORMAL
MCH RBC QN AUTO: 27.9 PG (ref 26.6–33)
MCHC RBC AUTO-ENTMCNC: 34.8 G/DL (ref 31.5–35.7)
MCV RBC AUTO: 80.3 FL (ref 79–97)
PATH REPORT.FINAL DX SPEC: NORMAL
PLATELET # BLD AUTO: 5 10*3/MM3 (ref 140–450)
PMV BLD AUTO: 8.7 FL (ref 6–12)
POTASSIUM SERPL-SCNC: 2.8 MMOL/L (ref 3.5–5.2)
POTASSIUM SERPL-SCNC: 4.2 MMOL/L (ref 3.5–5.2)
POTASSIUM SERPL-SCNC: 5.2 MMOL/L (ref 3.5–5.2)
PROT SERPL-MCNC: 6.6 G/DL (ref 6–8.5)
RBC # BLD AUTO: 3.14 10*6/MM3 (ref 3.77–5.28)
SODIUM SERPL-SCNC: 145 MMOL/L (ref 136–145)
UNIT  ABO: NORMAL
UNIT  RH: NORMAL
WBC NRBC COR # BLD: 1.9 10*3/MM3 (ref 3.4–10.8)

## 2023-05-19 PROCEDURE — 36430 TRANSFUSION BLD/BLD COMPNT: CPT

## 2023-05-19 PROCEDURE — 25010000002 METHYLPREDNISOLONE PER 40 MG: Performed by: INTERNAL MEDICINE

## 2023-05-19 PROCEDURE — 82948 REAGENT STRIP/BLOOD GLUCOSE: CPT

## 2023-05-19 PROCEDURE — 0 POTASSIUM CHLORIDE 10 MEQ/100ML SOLUTION: Performed by: INTERNAL MEDICINE

## 2023-05-19 PROCEDURE — 63710000001 INSULIN LISPRO (HUMAN) PER 5 UNITS: Performed by: NURSE PRACTITIONER

## 2023-05-19 PROCEDURE — 63710000001 INSULIN GLARGINE PER 5 UNITS: Performed by: NURSE PRACTITIONER

## 2023-05-19 PROCEDURE — 63710000001 DIPHENHYDRAMINE PER 50 MG: Performed by: INTERNAL MEDICINE

## 2023-05-19 PROCEDURE — 25010000002 ONDANSETRON PER 1 MG: Performed by: NURSE PRACTITIONER

## 2023-05-19 PROCEDURE — 94799 UNLISTED PULMONARY SVC/PX: CPT

## 2023-05-19 PROCEDURE — 86832 HLA CLASS I HIGH DEFIN QUAL: CPT

## 2023-05-19 PROCEDURE — 99232 SBSQ HOSP IP/OBS MODERATE 35: CPT | Performed by: INTERNAL MEDICINE

## 2023-05-19 PROCEDURE — 84132 ASSAY OF SERUM POTASSIUM: CPT | Performed by: INTERNAL MEDICINE

## 2023-05-19 PROCEDURE — P9100 PATHOGEN TEST FOR PLATELETS: HCPCS

## 2023-05-19 PROCEDURE — P9035 PLATELET PHERES LEUKOREDUCED: HCPCS

## 2023-05-19 RX ORDER — POTASSIUM CHLORIDE 7.45 MG/ML
10 INJECTION INTRAVENOUS
Status: COMPLETED | OUTPATIENT
Start: 2023-05-19 | End: 2023-05-19

## 2023-05-19 RX ORDER — POTASSIUM CHLORIDE 29.8 MG/ML
20 INJECTION INTRAVENOUS
Status: DISCONTINUED | OUTPATIENT
Start: 2023-05-19 | End: 2023-05-19

## 2023-05-19 RX ORDER — POTASSIUM CHLORIDE 20 MEQ/1
40 TABLET, EXTENDED RELEASE ORAL EVERY 4 HOURS
Status: DISPENSED | OUTPATIENT
Start: 2023-05-19 | End: 2023-05-19

## 2023-05-19 RX ORDER — METHYLPREDNISOLONE SODIUM SUCCINATE 40 MG/ML
40 INJECTION, POWDER, LYOPHILIZED, FOR SOLUTION INTRAMUSCULAR; INTRAVENOUS ONCE
Status: COMPLETED | OUTPATIENT
Start: 2023-05-19 | End: 2023-05-19

## 2023-05-19 RX ORDER — DIPHENHYDRAMINE HCL 25 MG
25 CAPSULE ORAL ONCE
Status: COMPLETED | OUTPATIENT
Start: 2023-05-19 | End: 2023-05-19

## 2023-05-19 RX ADMIN — INSULIN GLARGINE 20 UNITS: 100 INJECTION, SOLUTION SUBCUTANEOUS at 20:20

## 2023-05-19 RX ADMIN — LOSARTAN POTASSIUM 25 MG: 25 TABLET, FILM COATED ORAL at 08:21

## 2023-05-19 RX ADMIN — ACETAMINOPHEN 650 MG: 325 TABLET, FILM COATED ORAL at 20:27

## 2023-05-19 RX ADMIN — ONDANSETRON 4 MG: 2 INJECTION INTRAMUSCULAR; INTRAVENOUS at 08:19

## 2023-05-19 RX ADMIN — POTASSIUM CHLORIDE 10 MEQ: 7.46 INJECTION, SOLUTION INTRAVENOUS at 12:42

## 2023-05-19 RX ADMIN — Medication 5 MG: at 20:41

## 2023-05-19 RX ADMIN — CITALOPRAM HYDROBROMIDE 10 MG: 20 TABLET ORAL at 08:21

## 2023-05-19 RX ADMIN — SENNOSIDES AND DOCUSATE SODIUM 2 TABLET: 50; 8.6 TABLET ORAL at 08:21

## 2023-05-19 RX ADMIN — Medication 10 ML: at 10:16

## 2023-05-19 RX ADMIN — LEVOFLOXACIN 500 MG: 500 TABLET, FILM COATED ORAL at 09:21

## 2023-05-19 RX ADMIN — DIPHENHYDRAMINE HYDROCHLORIDE 25 MG: 25 CAPSULE ORAL at 16:52

## 2023-05-19 RX ADMIN — Medication 10 ML: at 20:23

## 2023-05-19 RX ADMIN — POTASSIUM CHLORIDE 40 MEQ: 1500 TABLET, EXTENDED RELEASE ORAL at 04:00

## 2023-05-19 RX ADMIN — METOPROLOL SUCCINATE 25 MG: 25 TABLET, EXTENDED RELEASE ORAL at 08:21

## 2023-05-19 RX ADMIN — ACETAMINOPHEN 650 MG: 325 TABLET, FILM COATED ORAL at 08:21

## 2023-05-19 RX ADMIN — POTASSIUM CHLORIDE 10 MEQ: 7.46 INJECTION, SOLUTION INTRAVENOUS at 13:36

## 2023-05-19 RX ADMIN — ALPRAZOLAM 0.5 MG: 0.5 TABLET ORAL at 20:33

## 2023-05-19 RX ADMIN — METHYLPREDNISOLONE SODIUM SUCCINATE 40 MG: 40 INJECTION, POWDER, FOR SOLUTION INTRAMUSCULAR; INTRAVENOUS at 16:52

## 2023-05-19 RX ADMIN — GABAPENTIN 300 MG: 300 CAPSULE ORAL at 08:21

## 2023-05-19 RX ADMIN — GABAPENTIN 300 MG: 300 CAPSULE ORAL at 20:19

## 2023-05-19 RX ADMIN — INSULIN LISPRO 6 UNITS: 100 INJECTION, SOLUTION INTRAVENOUS; SUBCUTANEOUS at 21:01

## 2023-05-19 RX ADMIN — POTASSIUM CHLORIDE 10 MEQ: 7.46 INJECTION, SOLUTION INTRAVENOUS at 09:45

## 2023-05-19 RX ADMIN — POTASSIUM CHLORIDE 10 MEQ: 7.46 INJECTION, SOLUTION INTRAVENOUS at 14:55

## 2023-05-19 RX ADMIN — ALLOPURINOL 500 MG: 300 TABLET ORAL at 08:21

## 2023-05-19 RX ADMIN — MIRTAZAPINE 15 MG: 15 TABLET, FILM COATED ORAL at 20:19

## 2023-05-19 RX ADMIN — ACETAMINOPHEN 650 MG: 325 TABLET, FILM COATED ORAL at 13:36

## 2023-05-19 RX ADMIN — POTASSIUM CHLORIDE 10 MEQ: 7.46 INJECTION, SOLUTION INTRAVENOUS at 08:36

## 2023-05-19 RX ADMIN — GABAPENTIN 300 MG: 300 CAPSULE ORAL at 16:52

## 2023-05-19 RX ADMIN — POTASSIUM CHLORIDE 10 MEQ: 7.46 INJECTION, SOLUTION INTRAVENOUS at 11:34

## 2023-05-19 NOTE — PROGRESS NOTES
Hematology/Oncology Inpatient Progress Note    PATIENT NAME: Nieves Mc  : 1975  MRN: [id number]    CHIEF COMPLAINT:     HISTORY OF PRESENT ILLNESS:      Nieves Mc is a 47 y.o. female who presented to [facility] on 2023 with complaints of nausea, vomiting, coughing and chills during a platelet transfusion.  Past medical history of CML on ponatinib, cardiomyopathy, chronic pain, uncontrolled type 1 diabetes, chronic anemia, insomnia, anxiety and depression, pulmonary embolism on chronic anticoagulation with Xarelto, medical noncompliance.       Evaluation in the ED: Patient reported a fall 2 to 3 weeks ago during which she hit the right side of her head a CT of the head was done showing bilateral subdural hygromas suggesting remote subdural hemorrhage; local mass effect with sulcal and effacement and decreased of the size of lateral ventricles but no evidence of herniation; no acute intracranial hemorrhage.  The patient was admitted for further treatment of her pancytopenia and remote subdural hemorrhage with plans for pretreatment with IV Solu-Medrol, Tylenol and Benadryl prior to transfusing another unit of platelets.        23  Hematology/Oncology was consulted on a patient known to us and followed in the office by Dr. Lerma for her diagnosis of CML.  Patient initially presented in  when she was seen in consultation while hospitalized.  At that time the patient was on imatinib.  In  she had progressive thrombocytosis and was transition to nilotinib.  After that she was lost to follow-up until she was once again seen during a hospitalization in .  She was continued on nilotinib and hydroxyurea.  In 2022 she had once again been lost to follow-up for 3 months when she presented in the office after not taking nilotinib during that time.  She had shortness of breath and cough for which a 2D echocardiogram was done and showed a reduced EF of 41 to 45%.  Due to  cardiomyopathy she was transitioned to imatinib and hydroxyurea.  In November 2022 bone marrow biopsy showed her to be 18 accelerated phase CML and that the imatinib was not controlling her malignancy well and she was initiated on ponatinib 45 mg p.o. daily.  She had significant body pain with this dose and for that reason was reduced to 30 mg p.o. daily.  She was once again lost to follow-up for approximately 6 weeks during that time she self reduced to ponatinib 15 mg daily.  She was seen in the office on 5/17/2023 at which time her platelet count was noted to be 8000 and she was severely neutropenic.  Her ponatinib was held and she was sent to the ambulatory care unit for 2 units of platelets to be transfused and a platelet level to be checked posttransfusion for further planning.     Patient has a history of CML on ponatinib.  Patient has been noncompliant with lab evaluations as recommended.  She has been taking 50 mg of ponatinib.  She comes to the office with significant pancytopenia, severe neutropenia, platelets of 7000 and anemia.  Patient was sent to the ambulatory care for platelet transfusions, she developed chills nausea but did receive platelets.  Repeat platelet check was no significantly changed at 8000 for that reason patient was sent to the ER to be admitted to the hospital.  She has a history of falls approximately 2 weeks earlier.  While in the ER she had CT of the head which showed old subdural hematoma.  Patient denies nausea vomiting fevers or chills.  She has not been able to come to the office due to social issues, transportation issues     He/She  has a past medical history of Bone pain, COVID-19 virus infection (01/12/2022), Diabetes mellitus, Extremity pain, Leg pain, Migraine, Pulmonary embolism, and Vision loss.     PCP: Alida Latham APRN    Subjective      She has no new issues today    ROS:    Review of Systems   Constitutional: Positive for fatigue.   Neurological:  "Positive for weakness.        MEDICATIONS:    Scheduled Meds:  allopurinol, 500 mg, Oral, Daily  budesonide-formoterol, 2 puff, Inhalation, BID - RT  citalopram, 10 mg, Oral, Daily  diphenhydrAMINE, 25 mg, Oral, Once  empagliflozin, 25 mg, Oral, Daily  furosemide, 40 mg, Oral, Daily  gabapentin, 300 mg, Oral, TID  insulin glargine, 20 Units, Subcutaneous, Nightly  insulin lispro, 2-7 Units, Subcutaneous, 4x Daily With Meals & Nightly  levoFLOXacin, 500 mg, Oral, Daily  losartan, 25 mg, Oral, Daily  methylPREDNISolone sodium succinate, 40 mg, Intravenous, Once  metoprolol succinate XL, 25 mg, Oral, Daily  mirtazapine, 15 mg, Oral, Nightly  senna-docusate sodium, 2 tablet, Oral, BID  sodium chloride, 10 mL, Intravenous, Q12H       Continuous Infusions:  pain,        PRN Meds:  •  acetaminophen **OR** acetaminophen **OR** acetaminophen  •  ALPRAZolam  •  senna-docusate sodium **AND** polyethylene glycol **AND** bisacodyl **AND** bisacodyl  •  Calcium Replacement - Follow Nurse / BPA Driven Protocol  •  dextrose  •  dextrose  •  glucagon (human recombinant)  •  hydrOXYzine  •  Magnesium Standard Dose Replacement - Follow Nurse / BPA Driven Protocol  •  melatonin  •  ondansetron **OR** ondansetron  •  Phosphorus Replacement - Follow Nurse / BPA Driven Protocol  •  Potassium Replacement - Follow Nurse / BPA Driven Protocol  •  prochlorperazine  •  sodium chloride  •  sodium chloride     ALLERGIES:  No Known Allergies    Objective    VITALS:   /72   Pulse 87   Temp 98.2 °F (36.8 °C) (Oral)   Resp 21   Ht 162.6 cm (64\")   Wt 52.3 kg (115 lb 4.8 oz)   LMP 05/25/2022 (Approximate)   SpO2 96%   BMI 19.79 kg/m²     PHYSICAL EXAM: (performed by MD)  Physical Exam  Constitutional:       Appearance: Normal appearance. She is ill-appearing.   HENT:      Right Ear: External ear normal.      Left Ear: External ear normal.   Pulmonary:      Effort: Pulmonary effort is normal.   Abdominal:      General: Abdomen is flat. "   Neurological:      General: No focal deficit present.      Mental Status: She is alert and oriented to person, place, and time. Mental status is at baseline.   Psychiatric:         Mood and Affect: Mood normal.         Behavior: Behavior normal.         Thought Content: Thought content normal.         Judgment: Judgment normal.           RECENT LABS:  Lab Results (last 24 hours)     Procedure Component Value Units Date/Time    Potassium [[id number]]  (Normal) Collected: 05/19/23 1151    Specimen: Blood Updated: 05/19/23 1224     Potassium 4.2 mmol/L     Narrative:      Result checked      HLA Antibody Identification Class I High Resolution - Miscellaneous Test [[id number]] Collected: 05/19/23 1151    Specimen: Blood Updated: 05/19/23 1156    POC Glucose Once [[id number]]  (Abnormal) Collected: 05/19/23 1141    Specimen: Blood Updated: 05/19/23 1143     Glucose 162 mg/dL      Comment: Serial Number: [id number]:  [id number]       Pathology Consultation [[id number]] Collected: 05/18/23 0657    Specimen: Blood, Venous Line Updated: 05/19/23 0833     Final Diagnosis --     Pancytopenia  No blasts identified       Case Report --     Surgical Pathology Report                         Case: [id number]                                  Authorizing Provider:  Roma Brown APRN Collected:           05/18/2023 06:57 AM          Ordering Location:     [facility]       Received:            05/19/2023 07:28 AM                                 CARDIOVASCULAR CARE UNIT                                                     Pathologist:           Amador Jackson MD                                                            Specimen:    Blood, Venous Line                                                                         POC Glucose Once [[id number]]  (Normal) Collected: 05/19/23 0201    Specimen: Blood Updated: 05/19/23 0202     Glucose 94 mg/dL      Comment: Serial Number: [id number]:  [id number]        Comprehensive Metabolic Panel [[id number]]  (Abnormal) Collected: 05/18/23 2323    Specimen: Blood Updated: 05/19/23 0049     Glucose 71 mg/dL      BUN 22 mg/dL      Creatinine 0.51 mg/dL      Sodium 145 mmol/L      Potassium 2.8 mmol/L      Chloride 106 mmol/L      CO2 27.0 mmol/L      Calcium 9.1 mg/dL      Total Protein 6.6 g/dL      Albumin 3.8 g/dL      ALT (SGPT) 21 U/L      AST (SGOT) 13 U/L      Alkaline Phosphatase 193 U/L      Total Bilirubin 1.4 mg/dL      Globulin 2.8 gm/dL      A/G Ratio 1.4 g/dL      BUN/Creatinine Ratio 43.1     Anion Gap 12.0 mmol/L      eGFR 116.0 mL/min/1.73     Narrative:      GFR Normal >60  Chronic Kidney Disease <60  Kidney Failure <15      CBC (No Diff) [[id number]]  (Abnormal) Collected: 05/18/23 2323    Specimen: Blood Updated: 05/19/23 0027     WBC 1.90 10*3/mm3      RBC 3.14 10*6/mm3      Hemoglobin 8.8 g/dL      Hematocrit 25.3 %      MCV 80.3 fL      MCH 27.9 pg      MCHC 34.8 g/dL      RDW 19.0 %      RDW-SD 55.6 fl      MPV 8.7 fL      Platelets 5 10*3/mm3     POC Glucose Once [[id number]]  (Abnormal) Collected: 05/18/23 2059    Specimen: Blood Updated: 05/18/23 2302     Glucose 157 mg/dL      Comment: Serial Number: [id number]:  [id number]       Pathology Consultation [[id number]] Collected: 05/17/23 0606    Specimen: Blood Product Bag Updated: 05/18/23 1839     Final Diagnosis --     Non-febrile non-hemolytic transfusion reaction following administration of a unit of single donor apheresis platelets. Symptoms noted include chills and cough. Inquiring after the transfusion reaction was called to the blood bank also revealed presence of nausea, headache, and petechia to chest, lips, and lower legs. No shortness of air. Platelet count noted to be as high as 1,017 K a month ago and in past 2 days has been between 7 K and 10 K. Patient with clinical history of CML. No increase in platelet count with this transfusion. Unclear whether all the symptoms experienced could  have been directly due to the transfusion or whether the patient's clinical condition could have also contributed. If additional platelet transfusions are needed would recommend slow infusion time and close monitoring as well as pretreatment with Tylenol and Benadryl. One could also request HLA typing with platelet work up to evaluate for the presence of antibodies and potentially to find better matched platelets.       Case Report --     Surgical Pathology Report                         Case: [id number]                                  Authorizing Provider:  Wagner Eckert MD          Collected:           05/17/2023 06:06 AM          Ordering Location:     [facility]       Received:            05/18/2023 02:28 PM                                 CARDIOVASCULAR CARE UNIT                                                     Pathologist:           Amador Hodges MD                                                           Specimen:    Blood Product Bag                                                                          POC Glucose Once [[id number]]  (Abnormal) Collected: 05/18/23 1825    Specimen: Blood Updated: 05/18/23 1826     Glucose 215 mg/dL      Comment: Serial Number: [id number]:  [id number]       Hemoglobin & Hematocrit, Blood [[id number]]  (Abnormal) Collected: 05/18/23 1640    Specimen: Blood Updated: 05/18/23 1716     Hemoglobin 8.1 g/dL      Hematocrit 24.1 %           PENDING RESULTS:     IMAGING REVIEWED:  CT Head Without Contrast    Result Date: 5/18/2023  Impression: Persistent bilateral chronic subdural hematoma/hygromas are present, 7 mm in maximal thickness on the right with some mild underlying sulcal effacement. While nonspecific and with potentially numerous etiologies, findings could be seen with intracranial hypotension or possibly remote or unreported trauma. Consider contrast-enhanced MRI when able, to further evaluate. Electronically Signed: Apolinar Byrne  5/18/2023 5:38  AM EDT  Workstation ID: [id number]    CT Head Without Contrast    Result Date: 5/17/2023  Bilateral subdural hygromas suggesting remote subdural hemorrhage. There is local mass effect with sulcal effacement and decreased of the size of the lateral ventricles but no evidence of herniation at this time No acute intracranial hemorrhage appreciated. Recommend follow-up, further evaluation as clinically indicated Findings were discussed with the ordering provider JP Kent at the time of interpretation Electronically Signed: Freddy Toribio  5/17/2023 7:28 PM EDT  Workstation ID: [id number]      Assessment & Plan      ASSESSMENT:    1. Pancytopenia: Secondary to ponatinib, bone marrow depression.  She has had decrease in the size of her spleen.  She has developed significant thrombocytopenia not responsive to platelet transfusions likely due to TKI, likely fibrosis of the bone marrow from CML.  HLA typing of the platelets have been ordered.  Continue to transfuse to keep platelets between 15 and 20,000 if possible is due to recent subdural hematoma  2. Accelerated phase CML, status post bone marrow biopsy on 11/3/2022.  Initiated on ponatinib and required dose reduction due to generalized body pain.  Was taking ponatinib 15 mg prior to hospitalization.  3. Bilateral subdural hematomas: Evaluated by neurosurgery with plan for a follow-up CT of the head in 2 weeks.  Neurologically she is stable  4. Multiple chronic conditions: Type 2 diabetes mellitus, nonischemic cardiomyopathy, chronic pain, depression and anxiety     PLANS    1. Continue to hold ponatinib  2. Hold Xarelto  3. Monitor CBC and transfuse for hemoglobin less than 7.0 g/dL, platelets less than 20,000 with premedication for history of transfusion reaction   4. HLA typing of platelets ordered   5. Continue neutropenic precautions and prophylactic Levaquin  6. We will continue to follow                       Electronically signed by Meghann Bradshaw  MD Florentin, 05/19/23, 5:49 PM EDT.

## 2023-05-19 NOTE — CASE MANAGEMENT/SOCIAL WORK
Continued Stay Note  ZOHAIB Amor     Patient Name: Nieves Mc  MRN: [id number]  Today's Date: 5/19/2023    Admit Date: 5/17/2023    Plan: Anticipate routine home. [facility] pending acceptance, order per MD. [place] referral for 3:1 BSC and rollator (Orders pending from MD).   Discharge Plan     Row Name 05/19/23 1617       Plan    Plan Anticipate routine home. [facility] referral pending acceptance, order per MD. [place] referral for 3:1 BSC and rollator (Orders pending from MD).    Plan Comments CM made referral to [facility] per patient discussion with SW, liaison notified, pending acceptance, order per MD. CM requested 3:1 BSC and rollator orders from MD, [place] liaison notified.                      Continued Stay Note  ZOHAIB Amor     Patient Name: Nieves Mc  MRN: [id number]  Today's Date: 5/19/2023    Admit Date: 5/17/2023    Plan: Anticipate routine home. Declines home health and OP PT at this time. Watch for transport needs at d/c.   Discharge Plan     Row Name 05/19/23 1344       Plan    Plan Anticipate routine home. Declines home health and OP PT at this time. Watch for transport needs at d/c.    Patient/Family in Agreement with Plan yes    Plan Comments CM met with patient and mother at bedside. Patient and mother state patient did have Medicaid, but unsure why is was dropped and what documents were needed to get coverage again. Patient reports difficulty affording medicaitions (Lantus) and issues with transportation to [facility]. CM notified SW. Patient declines home health and OP PT at this time due to financial and transportation concerns until Medicaid can be reinstated. Barrier to D/C: monitoring platelets and electrolytes, heme/onc following.                    Expected Discharge Date and Time     Expected Discharge Date Expected Discharge Time    May 23, 2023         Met with patient in room. Maintained distance greater than six feet and spent less than 15 minutes in the  room.        DELORES Pack[place], [place] [address]    Office: [phone]  Fax: [phone]

## 2023-05-19 NOTE — PLAN OF CARE
Problem: Fall Injury Risk  Goal: Absence of Fall and Fall-Related Injury  Outcome: Ongoing, Progressing  Intervention: Identify and Manage Contributors  Recent Flowsheet Documentation  Taken 5/19/2023 0500 by Monica Tovar RN  Medication Review/Management: medications reviewed  Taken 5/19/2023 0200 by Monica Tovar RN  Medication Review/Management: medications reviewed  Taken 5/18/2023 2242 by Monica Tovar RN  Medication Review/Management: medications reviewed  Intervention: Promote Injury-Free Environment  Recent Flowsheet Documentation  Taken 5/19/2023 0500 by Monica Tovar RN  Safety Promotion/Fall Prevention:   activity supervised   clutter free environment maintained   fall prevention program maintained   room organization consistent   safety round/check completed  Taken 5/19/2023 0200 by Monica Tovar RN  Safety Promotion/Fall Prevention:   activity supervised   clutter free environment maintained   fall prevention program maintained   lighting adjusted  Taken 5/18/2023 2242 by Monica Tovar RN  Safety Promotion/Fall Prevention:   activity supervised   clutter free environment maintained   fall prevention program maintained   lighting adjusted   room organization consistent   safety round/check completed     Problem: Skin Injury Risk Increased  Goal: Skin Health and Integrity  Outcome: Ongoing, Progressing  Intervention: Optimize Skin Protection  Recent Flowsheet Documentation  Taken 5/18/2023 2242 by Monica Tovar RN  Pressure Reduction Techniques: frequent weight shift encouraged  Pressure Reduction Devices:   pressure-redistributing mattress utilized   positioning supports utilized  Skin Protection: adhesive use limited   Goal Outcome Evaluation:

## 2023-05-19 NOTE — TELEPHONE ENCOUNTER
Called patient and let her know of her appointments with CT and Dr. Patel. Patient understood and also sent "[employer] message for her appt dates as well as addresses.

## 2023-05-19 NOTE — CASE MANAGEMENT/SOCIAL WORK
Social Work Assessment  [facility]     Patient Name: Nieves Mc  MRN: [id number]  Today's Date: 5/19/2023    Admit Date: 5/17/2023     Discharge Plan       Row Name 05/19/23 1540       Plan    Plan Comments LSW received message regarding patient, [facility] to ambulatory care unit. Per secure chat, patient requesting assistance with transportation for future appointments. LSW was going to follow-up via phone & mail resource. Patient ended up admitting to hospital same day related to a reaction. Met with patient at bedside & supplied transport list. Patient staying locally in [place] with family, but resides in [facility]. Advised of [employer] for affordable transport when staying [facility], but they do not go to [facility]. Discussed [employer] and calling listed number for potential transport services. Patient verbalized understanding. Discussed [facility] program for management of medical condition, while continuing ongoing treatment. Patient in agreement, referral made. Agreeable to [facility], CM notified, first choice is [facility]. Discussed DME needs, expressed interest in rollator (if covered by payor) and shower chair (3 in 1 BSC, if covered), CM notified. Discussed about [employer] screening for secondary [employer] coverage per CM conversation, patient agreeable, email sent to [employer] requesting screen.         BLANCHE Bowman    Phone: [phone]  Cell: [phone]  Fax: [phone]  [email or web address]

## 2023-05-19 NOTE — CONSULTS
Nutrition Services    Patient Name: Nieves Mc  YOB: 1975  MRN: [id number]  Admission date: 2023    Comment:  Addition of Boost Plus q daily (Provides 360 kcals, 14 g of protein if consumed), addition of Magic Cups at lunch/dinner (Provides 580 kcals, 18 g protein if consumed)     Moderate chronic disease related malnutrition related to decreased appetite in the setting of chronic myeloid leukemia as evidenced by PO intake meeting less than 75% of estimated energy requirement for greater than or equal to 3 months, > 10% wt loss in less than 6 months, and moderate muscle and fat wasting per NFPE.     See MSA below. Pt nearing severe chronic disease related malnutrition.    PPE Documentation        PPE Worn By Provider mask   PPE Worn By Patient  N/A     CLINICAL NUTRITION ASSESSMENT      Reason for Assessment : MST: 4, low-normal BMI     H&P      Past Medical History:   Diagnosis Date   • Bone pain    • COVID-19 virus infection 2022   • Diabetes mellitus    • Extremity pain     Carlin. legs pain   • Leg pain     left leg greater   • Migraine    • Pulmonary embolism    • Vision loss     doing surgery       Past Surgical History:   Procedure Laterality Date   • BONE MARROW BIOPSY     • BREAST SURGERY     • BRONCHOSCOPY N/A 2022    Procedure: BRONCHOSCOPY bilateral lung washing;  Surgeon: Charlene Camara MD;  Location: [facility] ENDOSCOPY;  Service: Pulmonary;  Laterality: N/A;  post: rule out infection vs transfusion lung injury   •  SECTION     • CHOLECYSTECTOMY     • EYE SURGERY      laser surgery due  to hemmorage--- 2021-- another surgery  lt eye11/15/21   • RETINAL DETACHMENT SURGERY     • SPINE SURGERY      Lombardi spinal block   • TUBAL ABDOMINAL LIGATION          Current Problems   Pancytopenia  -oncology following   -neutropenic precautions    Remote subdural hemorrhage   -s/p fall 2-3 weeks ago  -neurosurgery following    CML       Encounter Information       "  Trending Narrative     5/19: Pt admitted with pancytopenia. Pt with CML receiving chemotherapy at home. Pt reports she fell 2-3 weeks ago. Pt with remote subdural hemorrhage. Neurosurgery following. Pt states she has eaten very little in the past 2-3 weeks since falling. The pt reports she has been laying in bed in pain. Pt wanted to drink ONS and remembers liking magic cups. RD provided pt with ONS at time of visit. RD encouraged pt to ask for these when she wants one. NFPE completed, consistent with nutrition diagnosis of malnutrition using AND/ASPEN criteria. See MSA below.        Anthropometrics        Current Height, Weight Height: 162.6 cm (64\")  Weight: 52.3 kg (115 lb 4.8 oz) (05/19/23 0500)       Ideal Body Weight (IBW) 120#   Usual Body Weight (UBW) >200# prior to leukemia per pt       Trending Weight Hx     This admission: 5/19: 115# - scale              PTA: ~14% wt loss x 1.5 months, pt states she has hardly eaten anything since falling and hitting her head because she has been in so much pain    Wt Readings from Last 30 Encounters:   05/19/23 0500 52.3 kg (115 lb 4.8 oz)   05/17/23 1749 49.9 kg (110 lb)   05/17/23 1105 50 kg (110 lb 3.2 oz)   04/08/23 1141 61.2 kg (134 lb 14.7 oz)   04/06/23 1451 63.4 kg (139 lb 12.8 oz)   03/23/23 1434 71.8 kg (158 lb 6.4 oz)   03/15/23 1530 71.2 kg (157 lb)   03/10/23 1116 70.3 kg (155 lb)   03/02/23 1436 70.9 kg (156 lb 6.4 oz)   02/20/23 1456 65.8 kg (145 lb)   02/13/23 1426 65.6 kg (144 lb 9.6 oz)   02/03/23 1149 68 kg (150 lb)   01/26/23 1425 65 kg (143 lb 6.4 oz)   01/19/23 1429 64.3 kg (141 lb 12.8 oz)   01/12/23 1444 65 kg (143 lb 6.4 oz)   01/05/23 1109 62.6 kg (138 lb)   11/16/22 0727 68.5 kg (151 lb)   11/16/22 0539 68.6 kg (151 lb 4.8 oz)   11/16/22 0338 71.1 kg (156 lb 12 oz)   11/15/22 0901 68 kg (150 lb)   11/15/22 0328 82 kg (180 lb 12.4 oz)   11/14/22 0349 79 kg (174 lb 2.6 oz)   11/13/22 0418 77.8 kg (171 lb 8.3 oz)   11/12/22 0526 79 kg (174 lb " 2.6 oz)   11/12/22 0329 79 kg (174 lb 2.6 oz)   11/11/22 0416 77.1 kg (169 lb 15.6 oz)   11/09/22 1001 62.2 kg (137 lb 3.2 oz)   11/08/22 1100 62.7 kg (138 lb 3 oz)   11/08/22 0422 67 kg (147 lb 11.3 oz)   11/07/22 1100 64.7 kg (142 lb 9.6 oz)   11/03/22 0930 68 kg (150 lb)   10/31/22 1253 68.3 kg (150 lb 9.2 oz)   10/19/22 1028 56.2 kg (123 lb 14.4 oz)   10/12/22 1043 56.2 kg (124 lb)   09/22/22 0959 60.8 kg (134 lb)   09/15/22 1111 60.8 kg (134 lb)   08/17/22 0832 60.3 kg (133 lb)   08/16/22 1527 59.5 kg (131 lb 1.6 oz)   08/09/22 1523 56.7 kg (125 lb)   08/04/22 0545 60.6 kg (133 lb 9.6 oz)   08/03/22 0444 61.6 kg (135 lb 12.9 oz)   08/02/22 0521 61.4 kg (135 lb 5.8 oz)   08/01/22 0524 61.3 kg (135 lb 2.3 oz)   07/30/22 0531 61 kg (134 lb 7.7 oz)   07/29/22 0500 60.8 kg (134 lb 0.6 oz)   07/28/22 2316 60.8 kg (134 lb 0.6 oz)   07/27/22 1624 60.5 kg (133 lb 6.1 oz)   07/27/22 1542 57.6 kg (127 lb)   07/08/22 0500 58 kg (127 lb 13.9 oz)   07/07/22 1500 56.2 kg (124 lb)   07/07/22 0530 59.2 kg (130 lb 8.2 oz)   07/05/22 0504 57.2 kg (126 lb)   07/02/22 0513 56.3 kg (124 lb 3.2 oz)   06/30/22 0504 57.4 kg (126 lb 9.6 oz)   06/29/22 2328 56.5 kg (124 lb 9.6 oz)   06/29/22 2019 56.5 kg (124 lb 9.6 oz)   06/29/22 1500 54.4 kg (120 lb)   06/10/22 0500 56.1 kg (123 lb 10.9 oz)   06/09/22 0500 58.5 kg (128 lb 15.5 oz)   06/05/22 0929 56.7 kg (125 lb)   06/01/22 1625 56.7 kg (125 lb)   06/01/22 1440 56.7 kg (125 lb)   05/18/22 0920 56.7 kg (125 lb)      BMI kg/m2 Body mass index is 19.79 kg/m².       Labs        Pertinent Labs Reviewed, management per attending.  Hypokalemia - replaced 5/19  Hyperglycemia - steroid    Results from last 7 days   Lab Units 05/18/23  2323 05/18/23  0318 05/17/23  1108   SODIUM mmol/L 145 140 140   POTASSIUM mmol/L 2.8* 3.6 3.2*   CHLORIDE mmol/L 106 105 103   CO2 mmol/L 27.0 24.0 23.0   BUN mg/dL 22* 26* 35*   CREATININE mg/dL 0.51* 0.56* 0.68   CALCIUM mg/dL 9.1 8.9 9.3   BILIRUBIN mg/dL 1.4*   --  1.2   ALK PHOS U/L 193*  --  230*   ALT (SGPT) U/L 21  --  26   AST (SGOT) U/L 13  --  14   GLUCOSE mg/dL 71 253* 375*     Results from last 7 days   Lab Units 05/18/23  2323 05/18/23  0657 05/17/23  1108   MAGNESIUM mg/dL  --   --  1.7   HEMOGLOBIN g/dL 8.8*   < > 8.6*   HEMATOCRIT % 25.3*   < > 25.1*    < > = values in this interval not displayed.     COVID19   Date Value Ref Range Status   07/27/2022 Not Detected Not Detected - Ref. Range Final     Lab Results   Component Value Date    HGBA1C 7.1 (H) 07/28/2022        Medications    Scheduled Medications allopurinol, 500 mg, Oral, Daily  budesonide-formoterol, 2 puff, Inhalation, BID - RT  citalopram, 10 mg, Oral, Daily  empagliflozin, 25 mg, Oral, Daily  furosemide, 40 mg, Oral, Daily  gabapentin, 300 mg, Oral, TID  insulin glargine, 20 Units, Subcutaneous, Nightly  insulin lispro, 2-7 Units, Subcutaneous, 4x Daily With Meals & Nightly  levoFLOXacin, 500 mg, Oral, Daily  losartan, 25 mg, Oral, Daily  metoprolol succinate XL, 25 mg, Oral, Daily  mirtazapine, 15 mg, Oral, Nightly  potassium chloride, 40 mEq, Oral, Q4H  senna-docusate sodium, 2 tablet, Oral, BID  sodium chloride, 10 mL, Intravenous, Q12H        Infusions pain,         PRN Medications •  acetaminophen **OR** acetaminophen **OR** acetaminophen  •  ALPRAZolam  •  senna-docusate sodium **AND** polyethylene glycol **AND** bisacodyl **AND** bisacodyl  •  Calcium Replacement - Follow Nurse / BPA Driven Protocol  •  dextrose  •  dextrose  •  glucagon (human recombinant)  •  hydrOXYzine  •  Magnesium Standard Dose Replacement - Follow Nurse / BPA Driven Protocol  •  melatonin  •  ondansetron **OR** ondansetron  •  Phosphorus Replacement - Follow Nurse / BPA Driven Protocol  •  Potassium Replacement - Follow Nurse / BPA Driven Protocol  •  prochlorperazine  •  sodium chloride  •  sodium chloride     Physical Findings        Trending Physical   Appearance, NFPE 5/19: NFPE completed, consistent with  nutrition diagnosis of malnutrition using AND/ASPEN criteria. See MSA below.      --  Edema  None documented     Bowel Function No BM documented this admission     Tubes No feeding tube     Chewing/Swallowing No reported difficulty      Skin No pressure injuries documented     --  Current Nutrition Orders & Evaluation of Intake       Oral Nutrition     Food Allergies NKFA   Current PO Diet Diet: Regular/House Diet; Neutropenic/Low Microbial; Texture: Regular Texture (IDDSI 7); Fluid Consistency: Thin (IDDSI 0)   Supplement none   PO Evaluation     Trending % PO Intake 5/19: 25% x 1 meal   --  Nutritional Risk Screening        NRS-2002 Score          Nutrition Diagnosis         Nutrition Dx Problem 1 Moderate chronic disease related malnutrition related to decreased appetite in the setting of chronic myeloid leukemia as evidenced by PO intake meeting less than 75% of estimated energy requirement for greater than or equal to 3 months, > 10% wt loss in less than 6 months, and moderate muscle and fat wasting per NFPE.       Nutrition Dx Problem 2        Intervention Goal         Intervention Goal(s) PO intake > 75%, ONS acceptance, wt maintenance/wt trend toward IBW     Nutrition Intervention        RD Action Addition of Boost Plus q daily (Provides 360 kcals, 14 g of protein if consumed), addition of Magic Cups at lunch/dinner (Provides 580 kcals, 18 g protein if consumed)        Nutrition Prescription          Diet Prescription Neutropenic/low microbial    Supplement Prescription Addition of Boost Plus q daily (Provides 360 kcals, 14 g of protein if consumed), addition of Magic Cups at lunch/dinner (Provides 580 kcals, 18 g protein if consumed)    --  Monitor/Evaluation        Monitor Per protocol, PO intake, Supplement intake, Pertinent labs, Weight, Skin status, GI status, Symptoms, POC/GOC     Malnutrition Severity Assessment      Patient meets criteria for : Moderate (non-severe) Malnutrition  Malnutrition Type  (last 8 hours)     Malnutrition Severity Assessment     Row Name 05/19/23 1004       Malnutrition Severity Assessment    Malnutrition Type Chronic Disease - Related Malnutrition    Row Name 05/19/23 1004       Insufficient Energy Intake     Insufficient Energy Intake Findings Severe    Insufficient Energy Intake  <75% of est. energy requirement for > or equal to 3 months    Row Name 05/19/23 1004       Unintentional Weight Loss     Unintentional Weight Loss Findings Severe    Unintentional Weight Loss  Weight loss greater than 10% in six months    Row Name 05/19/23 1004       Muscle Loss    Temple Region Moderate - slight depression    Clavicle Bone Region Moderate - some protrusion in females, visible in males    Acromion Bone Region Moderate - acromion may slightly protrude    Scapular Bone Region Moderate - mild depression, bones may show slightly    Dorsal Hand Region Moderate - slight depression    Patellar Region Moderate - patella more prominent, less muscle definition around patella    Anterior Thigh Region Moderate - mild depression on inner thigh    Posterior Calf Region Moderate - some roundness, slight firmness    Row Name 05/19/23 1004       Fat Loss    Subcutaneous Fat Loss Findings Moderate    Orbital Region  Moderate -  somewhat hollowness, slightly dark circles    Upper Arm Region Moderate - some fat tissue, not ample    Row Name 05/19/23 1004       Criteria Met (Must meet criteria for severity in at least 2 of these categories: M Wasting, Fat Loss, Fluid, Secondary Signs, Wt. Status, Intake)    Patient meets criteria for  Moderate (non-severe) Malnutrition                       Electronically signed by:  Dorinda Brown RD  05/19/23 06:54 EDT

## 2023-05-19 NOTE — PROGRESS NOTES
[facility] Medicine Services   Daily Progress Note    Patient Name: Nieves Mc  : 1975  MRN: [id number]  Primary Care Physician:  Alida Latham APRN  Date of admission: 2023  Date and Time of Service: 2023     Subjective      Chief Complaint: Left leg heaviness, low platelets, transfusion of platelet transfusion as outpatient, for 3 weeks back.      Patient Reports this patient has history of CML, she is getting chemotherapy at home, she has not been very good at compliant and blood check as outpatient, she fell 3 weeks back and she reported that since then she has lower back pain and she felt heaviness on her left leg, patient was found to have what appears to be old subdural hematoma and neurosurgery are consulted.  Also patient had pancytopenia she received blood transfusion today and platelet transfusion also consult hematology oncology.    2023: Today potassium was 2.8 and was replaced with p.o. and IV potassium, platelets are severely low at 7, neurosurgical work-up for right leg heaviness and subdural hematoma was not completed due to the patient implanted pain pump and very low platelets patient stable otherwise neurologically.      ROS   all review of systems are negative except mentioned above      Objective      Vitals:   Temp:  [97.5 °F (36.4 °C)-98.2 °F (36.8 °C)] 98.2 °F (36.8 °C)  Heart Rate:  [74-94] 87  Resp:  [10-21] 21  BP: ()/(59-89) 118/72  Flow (L/min):  [2] 2    Physical Exam  Constitutional:       Appearance: Normal appearance.   HENT:      Head: Normocephalic and atraumatic.      Right Ear: Tympanic membrane normal.      Left Ear: Tympanic membrane normal.      Nose: Nose normal.      Mouth/Throat:      Mouth: Mucous membranes are moist.   Eyes:      Extraocular Movements: Extraocular movements intact.      Pupils: Pupils are equal, round, and reactive to light.   Cardiovascular:      Rate and Rhythm: Normal rate.      Pulses: Normal  pulses.   Pulmonary:      Effort: Pulmonary effort is normal.   Abdominal:      General: Abdomen is flat.   Musculoskeletal:         General: Normal range of motion.      Cervical back: Normal range of motion.   Skin:     General: Skin is warm.      Capillary Refill: Capillary refill takes less than 2 seconds.   Neurological:      General: No focal deficit present.      Mental Status: She is alert.   Psychiatric:         Mood and Affect: Mood normal.             Result Review    Result Review:  I have personally reviewed the results from the time of this admission to 5/19/2023 15:45 EDT and agree with these findings:  [x]  Laboratory  []  Microbiology  []  Radiology  []  EKG/Telemetry   []  Cardiology/Vascular   []  Pathology  []  Old records  []  Other:  Most notable findings include: Glucose 239, sodium 140, potassium 3.6, bicarbonate 24, chloride 105, creatinine 0.56, BUN 26.    Vital cell count 0.6, hemoglobin 6.8, [place] 18.7, and platelets 7.  Wounds (last 24 hours)     LDA Wound     Row Name 05/19/23 0800 05/19/23 0500 05/19/23 0200       Wound 05/19/23 0146 Left lumbar spine    Wound - Properties Group Placement Date: 05/19/23  -AS Placement Time: 0146  -AS Side: Left  -AS Location: lumbar spine  -AS    Retired Wound - Properties Group Placement Date: 05/19/23  -AS Placement Time: 0146  -AS Side: Left  -AS Location: lumbar spine  -AS    Retired Wound - Properties Group Date first assessed: 05/19/23  -AS Time first assessed: 0146  -AS Side: Left  -AS Location: lumbar spine  -AS       Rash 05/18/23 0000 Right anterior foot other (see comments)    Rash - Properties Group Placement Date: 05/18/23  -TM Placement Time: 0000  -TM Side: Right  -TM Orientation: anterior  -TM Location: foot  -TM Type: other (see comments)  -TM Additional Comments: petechial  -TM    Distribution localized  -KL localized  -AG localized  -AG    Borders distinct  -KL distinct  -AG distinct  -AG    Characteristics flat  -KL flat  -AG  flat  -AG    Lesion Size pinpoint  -KL pinpoint  -AG pinpoint  -AG    Retired Rash - Properties Group Placement Date: 05/18/23  -TM Placement Time: 0000  -TM Side: Right  -TM Orientation: anterior  -TM Location: foot  -TM Type: other (see comments)  -TM Additional Comments: petechial  -TM    Retired Rash - Properties Group Date first assessed: 05/18/23  -TM Time first assessed: 0000  -TM Side: Right  -TM Orientation: anterior  -TM Location: foot  -TM Type: other (see comments)  -TM Additional Comments: petechial  -TM       Rash 05/18/23 0000 Left anterior foot other (see comments)    Rash - Properties Group Placement Date: 05/18/23  -TM Placement Time: 0000  -TM Side: Left  -TM Orientation: anterior  -TM Location: foot  -TM Type: other (see comments)  -TM Additional Comments: petichial  -TM    Distribution localized  -KL localized  -AG localized  -AG    Borders distinct  -KL distinct  -AG distinct  -AG    Characteristics flat  -KL flat  -AG flat  -AG    Lesion Size pinpoint  -KL pinpoint  -AG pinpoint  -AG    Retired Rash - Properties Group Placement Date: 05/18/23  -TM Placement Time: 0000  -TM Side: Left  -TM Orientation: anterior  -TM Location: foot  -TM Type: other (see comments)  -TM Additional Comments: petichial  -TM    Retired Rash - Properties Group Date first assessed: 05/18/23  -TM Time first assessed: 0000  -TM Side: Left  -TM Orientation: anterior  -TM Location: foot  -TM Type: other (see comments)  -TM Additional Comments: petichial  -TM       Rash 05/18/23 0000 lip other (see comments)    Rash - Properties Group Placement Date: 05/18/23  -TM Placement Time: 0000  -TM Location: lip  -TM Type: other (see comments)  -TM Additional Comments: petechial  -TM    Distribution localized  -KL localized  -AG localized  -AG    Borders active  -KL active  -AG active  -AG    Characteristics raised  -KL raised  -AG raised  -AG    Retired Rash - Properties Group Placement Date: 05/18/23  -TM Placement Time: 0000  -TM  Location: lip  -TM Type: other (see comments)  -TM Additional Comments: petechial  -TM    Retired Rash - Properties Group Date first assessed: 05/18/23  -TM Time first assessed: 0000  -TM Location: lip  -TM Type: other (see comments)  -TM Additional Comments: petechial  -TM    Row Name 05/19/23 0148 05/19/23 0147 05/19/23 0146       Wound 05/19/23 0146 Left lumbar spine    Wound - Properties Group Placement Date: 05/19/23  -AS Placement Time: 0146  -AS Side: Left  -AS Location: lumbar spine  -AS    Wound Image -- -- Images linked: 1  -AS    Retired Wound - Properties Group Placement Date: 05/19/23  -AS Placement Time: 0146  -AS Side: Left  -AS Location: lumbar spine  -AS    Retired Wound - Properties Group Date first assessed: 05/19/23  -AS Time first assessed: 0146  -AS Side: Left  -AS Location: lumbar spine  -AS       Rash 05/18/23 0000 Right anterior foot other (see comments)    Rash - Properties Group Placement Date: 05/18/23  -TM Placement Time: 0000  -TM Side: Right  -TM Orientation: anterior  -TM Location: foot  -TM Type: other (see comments)  -TM Additional Comments: petechial  -TM    Wound Image Images linked: 1  -AS -- --    Retired Rash - Properties Group Placement Date: 05/18/23  -TM Placement Time: 0000  -TM Side: Right  -TM Orientation: anterior  -TM Location: foot  -TM Type: other (see comments)  -TM Additional Comments: petechial  -TM    Retired Rash - Properties Group Date first assessed: 05/18/23  -TM Time first assessed: 0000  -TM Side: Right  -TM Orientation: anterior  -TM Location: foot  -TM Type: other (see comments)  -TM Additional Comments: petechial  -TM       Rash 05/18/23 0000 Left anterior foot other (see comments)    Rash - Properties Group Placement Date: 05/18/23  -TM Placement Time: 0000  -TM Side: Left  -TM Orientation: anterior  -TM Location: foot  -TM Type: other (see comments)  -TM Additional Comments: petichial  -TM    Wound Image -- Images linked: 1  -AS --    Retired Rash -  Properties Group Placement Date: 05/18/23  -TM Placement Time: 0000  -TM Side: Left  -TM Orientation: anterior  -TM Location: foot  -TM Type: other (see comments)  -TM Additional Comments: petichial  -TM    Retired Rash - Properties Group Date first assessed: 05/18/23  -TM Time first assessed: 0000  -TM Side: Left  -TM Orientation: anterior  -TM Location: foot  -TM Type: other (see comments)  -TM Additional Comments: petichial  -TM       Rash 05/18/23 0000 lip other (see comments)    Rash - Properties Group Placement Date: 05/18/23  -TM Placement Time: 0000  -TM Location: lip  -TM Type: other (see comments)  -TM Additional Comments: petechial  -TM    Wound Image -- Images linked: 1  -AS --    Retired Rash - Properties Group Placement Date: 05/18/23  -TM Placement Time: 0000  -TM Location: lip  -TM Type: other (see comments)  -TM Additional Comments: petechial  -TM    Retired Rash - Properties Group Date first assessed: 05/18/23  -TM Time first assessed: 0000  -TM Location: lip  -TM Type: other (see comments)  -TM Additional Comments: petechial  -TM    Row Name 05/18/23 2242 05/18/23 2020 05/18/23 1600       Wound 05/19/23 0146 Left lumbar spine    Wound - Properties Group Placement Date: 05/19/23  -AS Placement Time: 0146  -AS Side: Left  -AS Location: lumbar spine  -AS    Retired Wound - Properties Group Placement Date: 05/19/23  -AS Placement Time: 0146  -AS Side: Left  -AS Location: lumbar spine  -AS    Retired Wound - Properties Group Date first assessed: 05/19/23  -AS Time first assessed: 0146  -AS Side: Left  -AS Location: lumbar spine  -AS       Rash 05/18/23 0000 Right anterior foot other (see comments)    Rash - Properties Group Placement Date: 05/18/23  -TM Placement Time: 0000  -TM Side: Right  -TM Orientation: anterior  -TM Location: foot  -TM Type: other (see comments)  -TM Additional Comments: petechial  -TM    Distribution localized  -AG localized  -ER localized  -MA    Color red  -AG red  -ER red   -MA    Configuration/Shape round;symmetrical;pinpoint  -AG pinpoint;round;symmetrical  -ER pinpoint;round;symmetrical  -MA    Borders distinct  -AG distinct  -ER distinct  -MA    Characteristics flat  -AG flat  -ER flat  -MA    Lesion Size pinpoint  -AG pinpoint  -ER pinpoint  -MA    Care, Rash cleansed with  -AG open to air  -ER open to air  -MA    Retired Rash - Properties Group Placement Date: 05/18/23  -TM Placement Time: 0000  -TM Side: Right  -TM Orientation: anterior  -TM Location: foot  -TM Type: other (see comments)  -TM Additional Comments: petechial  -TM    Retired Rash - Properties Group Date first assessed: 05/18/23  -TM Time first assessed: 0000  -TM Side: Right  -TM Orientation: anterior  -TM Location: foot  -TM Type: other (see comments)  -TM Additional Comments: petechial  -TM       Rash 05/18/23 0000 Left anterior foot other (see comments)    Rash - Properties Group Placement Date: 05/18/23  -TM Placement Time: 0000  -TM Side: Left  -TM Orientation: anterior  -TM Location: foot  -TM Type: other (see comments)  -TM Additional Comments: petichial  -TM    Distribution localized  -AG localized  -ER localized  -MA    Color red  -AG red  -ER red  -MA    Configuration/Shape round;symmetrical;pinpoint  -AG pinpoint;round;symmetrical  -ER pinpoint;round;symmetrical  -MA    Borders distinct  -AG distinct  -ER distinct  -MA    Characteristics flat  -AG flat  -ER flat  -MA    Lesion Size pinpoint  -AG pinpoint  -ER pinpoint  -MA    Care, Rash cleansed with  -AG open to air  -ER open to air  -MA    Retired Rash - Properties Group Placement Date: 05/18/23  -TM Placement Time: 0000  -TM Side: Left  -TM Orientation: anterior  -TM Location: foot  -TM Type: other (see comments)  -TM Additional Comments: petichial  -TM    Retired Rash - Properties Group Date first assessed: 05/18/23  -TM Time first assessed: 0000  -TM Side: Left  -TM Orientation: anterior  -TM Location: foot  -TM Type: other (see comments)  -TM  Additional Comments: petichial  -TM       Rash 05/18/23 0000 lip other (see comments)    Rash - Properties Group Placement Date: 05/18/23  -TM Placement Time: 0000  -TM Location: lip  -TM Type: other (see comments)  -TM Additional Comments: petechial  -TM    Distribution localized  -AG localized  -ER localized  -MA    Color color consistent with skin;purple;black  -AG purple;black  -ER purple;black  -MA    Configuration/Shape asymmetric  -AG asymmetric  -ER asymmetric  -MA    Borders active  -AG active  -ER --    Characteristics raised  -AG raised  -ER raised  -MA    Lesion Size -- 0.5 to 1 cm  -ER 0.5 to 1 cm  -MA    Care, Rash open to air  -AG open to air  -ER open to air  -MA    Retired Rash - Properties Group Placement Date: 05/18/23  -TM Placement Time: 0000  -TM Location: lip  -TM Type: other (see comments)  -TM Additional Comments: petechial  -TM    Retired Rash - Properties Group Date first assessed: 05/18/23  -TM Time first assessed: 0000 -TM Location: lip  -TM Type: other (see comments)  -TM Additional Comments: petechial  -TM          User Key  (r) = Recorded By, (t) = Taken By, (c) = Cosigned By    Initials Name Provider Type    AS Lizzie Shepherd RN Registered Nurse    Yomaira Quiroga, RN Registered Nurse    TM Cyn العلي RN Registered Nurse    ER Pedro Ortega, RN Registered Nurse    Katharina Cunha RN Registered Nurse    Monica Boyce RN Registered Nurse                  Assessment & Plan      allopurinol, 500 mg, Oral, Daily  budesonide-formoterol, 2 puff, Inhalation, BID - RT  citalopram, 10 mg, Oral, Daily  diphenhydrAMINE, 25 mg, Oral, Once  empagliflozin, 25 mg, Oral, Daily  furosemide, 40 mg, Oral, Daily  gabapentin, 300 mg, Oral, TID  insulin glargine, 20 Units, Subcutaneous, Nightly  insulin lispro, 2-7 Units, Subcutaneous, 4x Daily With Meals & Nightly  levoFLOXacin, 500 mg, Oral, Daily  losartan, 25 mg, Oral, Daily  methylPREDNISolone sodium succinate, 40 mg,  Intravenous, Once  metoprolol succinate XL, 25 mg, Oral, Daily  mirtazapine, 15 mg, Oral, Nightly  potassium chloride, 40 mEq, Oral, Q4H  potassium chloride, 10 mEq, Intravenous, Q1H  senna-docusate sodium, 2 tablet, Oral, BID  sodium chloride, 10 mL, Intravenous, Q12H       pain,          Active Hospital Problems:  Active Hospital Problems    Diagnosis    • **Pancytopenia    • Bilateral subdural hematomas    • Lumbar radiculopathy    • Traumatic subdural hemorrhage without loss of consciousness    • Chronic pain    • NICM (nonischemic cardiomyopathy)    • Type 2 diabetes mellitus with diabetic polyneuropathy, with long-term current use of insulin    • Depression with anxiety    • History of pulmonary embolism    • Medically noncompliant    • CML (chronic myelocytic leukemia)      Plan:   Pancytopenia  -WBC 2.6, Hgb 8.6, platelets 7  -received 1 unit of platelets in ambulatory care and developed a cough, chills, and nausea. Transfusion was stopped and patient was given IV decadron and bendadryl and sent to the ER  -plts now 8  -Solu-Medrol is given by recommendation of oncology  -start levaquin 500mg daily x7 days for prophylaxis per oncology notes  -neutropenic precautions  -oncology consulted      Remote subdural hemorrhage  -CT head: bilateral subdural hygromas suggesting remote subdural hemorrhage.    -Neurosurgery was consulted    CML  -per oncology notes no longer on Gleevec or hydroxyurea currently on Ponatinib 15mg followed      DM type 2  -glucose 375 at 11am  -check glucose AC/HS  -cont home lantus, premeal insulin, and SSI     Nonischemic Cardiomyopathy EF 44%  -cont home GDMT: BB, ARB, SGTL2, lasix     Chronic pain  -pain pump in place, managed by pain management outpatient. Pt just had pump adjusted yesterday      Anxiety  -cont home xanax    DVT prophylaxis:  Mechanical DVT prophylaxis orders are present.    CODE STATUS:    Level Of Support Discussed With: Patient  Code Status (Patient has no pulse and  is not breathing): CPR (Attempt to Resuscitate)  Medical Interventions (Patient has pulse or is breathing): Full Support      Disposition:  I expect patient to be discharged 3 days.    This patient has been examined wearing appropriate Personal Protective Equipment and discussed with hospital infection control department. 05/19/23      Electronically signed by Wagner Eckert MD, 05/19/23, 15:45 EDT.  [facility] Hospitalist Team

## 2023-05-19 NOTE — SIGNIFICANT NOTE
Pt reports not feeling well and feeling nauseous and weak this am. RN reports pt platelets at 5. Will f/u as appropriate.

## 2023-05-19 NOTE — NURSING NOTE
Critical platelet level called and reported to APRN on call for JB ( Xiao Whitfield), No new orders at this time.

## 2023-05-19 NOTE — DISCHARGE PLACEMENT REQUEST
"J Luis Kowalski (47 y.o. Female)     Date of Birth   1975    Social Security Number       Address   [address] [place]    Home Phone   [phone]    MRN   [id number]       Yarsanism   None    Marital Status   Legally                             Admission Date   5/17/23    Admission Type   Emergency    Admitting Provider   Mónica Tillman MD    Attending Provider   Wagner Eckert MD    Department, Room/Bed   [facility], 2127/1       Discharge Date       Discharge Disposition       Discharge Destination                               Attending Provider: Wagner Eckert MD    Allergies: No Known Allergies    Isolation: Contact   Infection: ESBL E coli (02/15/23)   Code Status: CPR    Ht: 162.6 cm (64\")   Wt: 52.3 kg (115 lb 4.8 oz)    Admission Cmt: None   Principal Problem: Pancytopenia [D61.818]                 Active Insurance as of 5/17/2023     Primary Coverage     Payor Plan Insurance Group Employer/Plan Group    ANTHEM MEDICARE REPLACEMENT ANTHEM MEDICARE ADVANTAGE [id number]     Payor Plan Address Payor Plan Phone Number Payor Plan Fax Number Effective Dates    [address] [phone]  1/1/2023 - None Entered    [facility] [address]       Subscriber Name Subscriber Birth Date Member ID       J LUIS KOWALSKI 1975 [id number]                 Emergency Contacts      (Rel.) Home Phone Work Phone Mobile Phone    Sayra Cabezas (Mother) [phone] -- --              "

## 2023-05-20 LAB
ALBUMIN SERPL-MCNC: 3.6 G/DL (ref 3.5–5.2)
ALBUMIN/GLOB SERPL: 1.3 G/DL
ALP SERPL-CCNC: 229 U/L (ref 39–117)
ALT SERPL W P-5'-P-CCNC: 22 U/L (ref 1–33)
ANION GAP SERPL CALCULATED.3IONS-SCNC: 11 MMOL/L (ref 5–15)
AST SERPL-CCNC: 14 U/L (ref 1–32)
BH BB BLOOD EXPIRATION DATE: NORMAL
BH BB BLOOD TYPE BARCODE: 5100
BH BB DISPENSE STATUS: NORMAL
BH BB PRODUCT CODE: NORMAL
BH BB UNIT NUMBER: NORMAL
BILIRUB SERPL-MCNC: 1.1 MG/DL (ref 0–1.2)
BUN SERPL-MCNC: 24 MG/DL (ref 6–20)
BUN/CREAT SERPL: 40.7 (ref 7–25)
CALCIUM SPEC-SCNC: 8.6 MG/DL (ref 8.6–10.5)
CHLORIDE SERPL-SCNC: 104 MMOL/L (ref 98–107)
CO2 SERPL-SCNC: 28 MMOL/L (ref 22–29)
CREAT SERPL-MCNC: 0.59 MG/DL (ref 0.57–1)
DEPRECATED RDW RBC AUTO: 55.6 FL (ref 37–54)
DEPRECATED RDW RBC AUTO: 56.9 FL (ref 37–54)
DEPRECATED RDW RBC AUTO: 57.3 FL (ref 37–54)
EGFRCR SERPLBLD CKD-EPI 2021: 112 ML/MIN/1.73
ERYTHROCYTE [DISTWIDTH] IN BLOOD BY AUTOMATED COUNT: 18.8 % (ref 12.3–15.4)
ERYTHROCYTE [DISTWIDTH] IN BLOOD BY AUTOMATED COUNT: 18.8 % (ref 12.3–15.4)
ERYTHROCYTE [DISTWIDTH] IN BLOOD BY AUTOMATED COUNT: 19 % (ref 12.3–15.4)
GLOBULIN UR ELPH-MCNC: 2.7 GM/DL
GLUCOSE BLDC GLUCOMTR-MCNC: 163 MG/DL (ref 70–105)
GLUCOSE BLDC GLUCOMTR-MCNC: 267 MG/DL (ref 70–105)
GLUCOSE BLDC GLUCOMTR-MCNC: 285 MG/DL (ref 70–105)
GLUCOSE BLDC GLUCOMTR-MCNC: 323 MG/DL (ref 70–105)
GLUCOSE SERPL-MCNC: 291 MG/DL (ref 65–99)
HCT VFR BLD AUTO: 20 % (ref 34–46.6)
HCT VFR BLD AUTO: 22.1 % (ref 34–46.6)
HCT VFR BLD AUTO: 22.7 % (ref 34–46.6)
HGB BLD-MCNC: 6.6 G/DL (ref 12–15.9)
HGB BLD-MCNC: 7.5 G/DL (ref 12–15.9)
HGB BLD-MCNC: 7.8 G/DL (ref 12–15.9)
LYMPHOCYTES # BLD MANUAL: 1.08 10*3/MM3 (ref 0.7–3.1)
MAGNESIUM SERPL-MCNC: 1.6 MG/DL (ref 1.6–2.6)
MCH RBC QN AUTO: 27.3 PG (ref 26.6–33)
MCH RBC QN AUTO: 27.5 PG (ref 26.6–33)
MCH RBC QN AUTO: 28 PG (ref 26.6–33)
MCHC RBC AUTO-ENTMCNC: 33 G/DL (ref 31.5–35.7)
MCHC RBC AUTO-ENTMCNC: 34 G/DL (ref 31.5–35.7)
MCHC RBC AUTO-ENTMCNC: 34.2 G/DL (ref 31.5–35.7)
MCV RBC AUTO: 81.1 FL (ref 79–97)
MCV RBC AUTO: 81.9 FL (ref 79–97)
MCV RBC AUTO: 82.6 FL (ref 79–97)
NEUTROPHILS # BLD AUTO: 0.02 10*3/MM3 (ref 1.7–7)
NEUTROPHILS NFR BLD MANUAL: 2 % (ref 42.7–76)
NRBC BLD AUTO-RTO: 0.3 /100 WBC (ref 0–0.2)
NRBC BLD AUTO-RTO: 1 /100 WBC (ref 0–0.2)
PLATELET # BLD AUTO: 3 10*3/MM3 (ref 140–450)
PLATELET # BLD AUTO: 4 10*3/MM3 (ref 140–450)
PLATELET # BLD AUTO: 4 10*3/MM3 (ref 140–450)
PMV BLD AUTO: 8 FL (ref 6–12)
PMV BLD AUTO: 8.4 FL (ref 6–12)
PMV BLD AUTO: 8.9 FL (ref 6–12)
POTASSIUM SERPL-SCNC: 4.7 MMOL/L (ref 3.5–5.2)
PROT SERPL-MCNC: 6.3 G/DL (ref 6–8.5)
RBC # BLD AUTO: 2.42 10*6/MM3 (ref 3.77–5.28)
RBC # BLD AUTO: 2.73 10*6/MM3 (ref 3.77–5.28)
RBC # BLD AUTO: 2.77 10*6/MM3 (ref 3.77–5.28)
RBC MORPH BLD: NORMAL
SCAN SLIDE: NORMAL
SMALL PLATELETS BLD QL SMEAR: ABNORMAL
SODIUM SERPL-SCNC: 143 MMOL/L (ref 136–145)
UNIT  ABO: NORMAL
UNIT  RH: NORMAL
VARIANT LYMPHS NFR BLD MANUAL: 98 % (ref 19.6–45.3)
WBC MORPH BLD: NORMAL
WBC NRBC COR # BLD: 0.4 10*3/MM3 (ref 3.4–10.8)
WBC NRBC COR # BLD: 0.6 10*3/MM3 (ref 3.4–10.8)
WBC NRBC COR # BLD: 1.1 10*3/MM3 (ref 3.4–10.8)

## 2023-05-20 PROCEDURE — 86900 BLOOD TYPING SEROLOGIC ABO: CPT

## 2023-05-20 PROCEDURE — P9016 RBC LEUKOCYTES REDUCED: HCPCS

## 2023-05-20 PROCEDURE — 99232 SBSQ HOSP IP/OBS MODERATE 35: CPT | Performed by: INTERNAL MEDICINE

## 2023-05-20 PROCEDURE — 85025 COMPLETE CBC W/AUTO DIFF WBC: CPT | Performed by: INTERNAL MEDICINE

## 2023-05-20 PROCEDURE — 85007 BL SMEAR W/DIFF WBC COUNT: CPT | Performed by: INTERNAL MEDICINE

## 2023-05-20 PROCEDURE — 80053 COMPREHEN METABOLIC PANEL: CPT | Performed by: INTERNAL MEDICINE

## 2023-05-20 PROCEDURE — P9100 PATHOGEN TEST FOR PLATELETS: HCPCS

## 2023-05-20 PROCEDURE — 25010000002 DEXAMETHASONE PER 1 MG

## 2023-05-20 PROCEDURE — 36430 TRANSFUSION BLD/BLD COMPNT: CPT

## 2023-05-20 PROCEDURE — 83735 ASSAY OF MAGNESIUM: CPT | Performed by: INTERNAL MEDICINE

## 2023-05-20 PROCEDURE — 63710000001 INSULIN LISPRO (HUMAN) PER 5 UNITS: Performed by: NURSE PRACTITIONER

## 2023-05-20 PROCEDURE — 82948 REAGENT STRIP/BLOOD GLUCOSE: CPT

## 2023-05-20 PROCEDURE — 0 MEPERIDINE PER 100 MG: Performed by: INTERNAL MEDICINE

## 2023-05-20 PROCEDURE — P9035 PLATELET PHERES LEUKOREDUCED: HCPCS

## 2023-05-20 RX ORDER — DEXTROSE MONOHYDRATE 25 G/50ML
10-50 INJECTION, SOLUTION INTRAVENOUS
Status: DISCONTINUED | OUTPATIENT
Start: 2023-05-20 | End: 2023-05-31 | Stop reason: HOSPADM

## 2023-05-20 RX ORDER — DIAPER,BRIEF,INFANT-TODD,DISP
1 EACH MISCELLANEOUS 3 TIMES DAILY PRN
Status: DISCONTINUED | OUTPATIENT
Start: 2023-05-20 | End: 2023-05-31 | Stop reason: HOSPADM

## 2023-05-20 RX ORDER — INSULIN LISPRO 100 [IU]/ML
1-200 INJECTION, SOLUTION INTRAVENOUS; SUBCUTANEOUS
Status: DISCONTINUED | OUTPATIENT
Start: 2023-05-20 | End: 2023-05-31 | Stop reason: HOSPADM

## 2023-05-20 RX ORDER — MEPERIDINE HYDROCHLORIDE 25 MG/ML
25 INJECTION INTRAMUSCULAR; INTRAVENOUS; SUBCUTANEOUS ONCE
Status: COMPLETED | OUTPATIENT
Start: 2023-05-20 | End: 2023-05-20

## 2023-05-20 RX ORDER — DEXAMETHASONE SODIUM PHOSPHATE 4 MG/ML
10 INJECTION, SOLUTION INTRA-ARTICULAR; INTRALESIONAL; INTRAMUSCULAR; INTRAVENOUS; SOFT TISSUE ONCE
Status: COMPLETED | OUTPATIENT
Start: 2023-05-20 | End: 2023-05-20

## 2023-05-20 RX ORDER — METHYLPREDNISOLONE SODIUM SUCCINATE 125 MG/2ML
125 INJECTION, POWDER, LYOPHILIZED, FOR SOLUTION INTRAMUSCULAR; INTRAVENOUS ONCE
Status: DISCONTINUED | OUTPATIENT
Start: 2023-05-20 | End: 2023-05-20

## 2023-05-20 RX ORDER — MEPERIDINE HYDROCHLORIDE 25 MG/ML
12.5 INJECTION INTRAMUSCULAR; INTRAVENOUS; SUBCUTANEOUS ONCE AS NEEDED
Status: DISPENSED | OUTPATIENT
Start: 2023-05-20 | End: 2023-05-21

## 2023-05-20 RX ORDER — NICOTINE POLACRILEX 4 MG
15 LOZENGE BUCCAL
Status: DISCONTINUED | OUTPATIENT
Start: 2023-05-20 | End: 2023-05-31 | Stop reason: HOSPADM

## 2023-05-20 RX ORDER — INSULIN LISPRO 100 [IU]/ML
1-200 INJECTION, SOLUTION INTRAVENOUS; SUBCUTANEOUS AS NEEDED
Status: DISCONTINUED | OUTPATIENT
Start: 2023-05-20 | End: 2023-05-31 | Stop reason: HOSPADM

## 2023-05-20 RX ADMIN — INSULIN LISPRO 4 UNITS: 100 INJECTION, SOLUTION INTRAVENOUS; SUBCUTANEOUS at 19:17

## 2023-05-20 RX ADMIN — CITALOPRAM HYDROBROMIDE 10 MG: 20 TABLET ORAL at 09:15

## 2023-05-20 RX ADMIN — MEPERIDINE HYDROCHLORIDE 12.5 MG: 25 INJECTION INTRAMUSCULAR; INTRAVENOUS; SUBCUTANEOUS at 22:50

## 2023-05-20 RX ADMIN — FUROSEMIDE 40 MG: 40 TABLET ORAL at 09:15

## 2023-05-20 RX ADMIN — EMPAGLIFLOZIN 25 MG: 25 TABLET, FILM COATED ORAL at 09:15

## 2023-05-20 RX ADMIN — MIRTAZAPINE 15 MG: 15 TABLET, FILM COATED ORAL at 21:29

## 2023-05-20 RX ADMIN — SODIUM CHLORIDE 500 ML: 0.9 INJECTION, SOLUTION INTRAVENOUS at 16:38

## 2023-05-20 RX ADMIN — GABAPENTIN 300 MG: 300 CAPSULE ORAL at 09:15

## 2023-05-20 RX ADMIN — LOSARTAN POTASSIUM 25 MG: 25 TABLET, FILM COATED ORAL at 09:15

## 2023-05-20 RX ADMIN — ALPRAZOLAM 0.5 MG: 0.5 TABLET ORAL at 22:49

## 2023-05-20 RX ADMIN — Medication 10 ML: at 09:15

## 2023-05-20 RX ADMIN — MEPERIDINE HYDROCHLORIDE 25 MG: 25 INJECTION INTRAMUSCULAR; INTRAVENOUS; SUBCUTANEOUS at 14:31

## 2023-05-20 RX ADMIN — DEXAMETHASONE SODIUM PHOSPHATE 10 MG: 4 INJECTION, SOLUTION INTRAMUSCULAR; INTRAVENOUS at 21:30

## 2023-05-20 RX ADMIN — ACETAMINOPHEN 650 MG: 325 TABLET, FILM COATED ORAL at 22:49

## 2023-05-20 RX ADMIN — ACETAMINOPHEN 650 MG: 325 TABLET, FILM COATED ORAL at 14:31

## 2023-05-20 RX ADMIN — Medication 10 ML: at 21:32

## 2023-05-20 RX ADMIN — GABAPENTIN 300 MG: 300 CAPSULE ORAL at 16:25

## 2023-05-20 RX ADMIN — METOPROLOL SUCCINATE 25 MG: 25 TABLET, EXTENDED RELEASE ORAL at 09:15

## 2023-05-20 RX ADMIN — LEVOFLOXACIN 500 MG: 500 TABLET, FILM COATED ORAL at 09:15

## 2023-05-20 RX ADMIN — ALLOPURINOL 500 MG: 300 TABLET ORAL at 09:14

## 2023-05-20 RX ADMIN — GABAPENTIN 300 MG: 300 CAPSULE ORAL at 21:29

## 2023-05-20 RX ADMIN — INSULIN LISPRO 5 UNITS: 100 INJECTION, SOLUTION INTRAVENOUS; SUBCUTANEOUS at 09:14

## 2023-05-20 RX ADMIN — INSULIN LISPRO 2 UNITS: 100 INJECTION, SOLUTION INTRAVENOUS; SUBCUTANEOUS at 12:56

## 2023-05-20 RX ADMIN — ACETAMINOPHEN 650 MG: 325 TABLET, FILM COATED ORAL at 12:47

## 2023-05-20 NOTE — SIGNIFICANT NOTE
05/20/23 1420   OTHER   Discipline physical therapy assistant   Rehab Time/Intention   Session Not Performed other (see comments)  (RN requested to hold PT this date due to pt's condition and low platelet levels.Will continue to follow as appropriate for PT.)   Therapy Assessment/Plan (PT)   Criteria for Skilled Interventions Met (PT) yes   Recommendation   PT - Next Appointment 05/22/23

## 2023-05-20 NOTE — PROGRESS NOTES
[facility] Medicine Services   Daily Progress Note    Patient Name: Nieves Mc  : 1975  MRN: [id number]  Primary Care Physician:  Alida Latham APRN  Date of admission: 2023  Date and Time of Service: 2023     Subjective      Chief Complaint: Left leg heaviness, low platelets, transfusion of platelet transfusion as outpatient, for 3 weeks back.      Patient Reports this patient has history of CML, she is getting chemotherapy at home, she has not been very good at compliant and blood check as outpatient, she fell 3 weeks back and she reported that since then she has lower back pain and she felt heaviness on her left leg, patient was found to have what appears to be old subdural hematoma and neurosurgery are consulted.  Also patient had pancytopenia she received blood transfusion today and platelet transfusion also consult hematology oncology.    2023: Today potassium was 2.8 and was replaced with p.o. and IV potassium, platelets are severely low at 7, neurosurgical work-up for right leg heaviness and subdural hematoma was not completed due to the patient implanted pain pump and very low platelets patient stable otherwise neurologically.      2023: Patient has multiple previous and she has also petechia especially on the right arm with a blood pressure cuff was placed, she will have any blood in the stool or urine, platelets are severely low and orders for placing platelets were placed by hematology    ROS   all review of systems are negative except mentioned above      Objective      Vitals:   Temp:  [98 °F (36.7 °C)-99.5 °F (37.5 °C)] 99.5 °F (37.5 °C)  Heart Rate:  [] 103  Resp:  [10-20] 18  BP: ()/(28-86) 89/38    Physical Exam  Constitutional:       Appearance: Normal appearance.   HENT:      Head: Normocephalic and atraumatic.      Right Ear: Tympanic membrane normal.      Left Ear: Tympanic membrane normal.      Nose: Nose normal.       Mouth/Throat:      Mouth: Mucous membranes are moist.   Eyes:      Extraocular Movements: Extraocular movements intact.      Pupils: Pupils are equal, round, and reactive to light.   Cardiovascular:      Rate and Rhythm: Normal rate.      Pulses: Normal pulses.   Pulmonary:      Effort: Pulmonary effort is normal.   Abdominal:      General: Abdomen is flat.   Musculoskeletal:         General: Normal range of motion.      Cervical back: Normal range of motion.   Skin:     General: Skin is warm.      Capillary Refill: Capillary refill takes less than 2 seconds.   Neurological:      General: No focal deficit present.      Mental Status: She is alert.   Psychiatric:         Mood and Affect: Mood normal.             Result Review    Result Review:  I have personally reviewed the results from the time of this admission to 5/20/2023 16:50 EDT and agree with these findings:  [x]  Laboratory  []  Microbiology  []  Radiology  []  EKG/Telemetry   []  Cardiology/Vascular   []  Pathology  []  Old records  []  Other:  Most notable findings include: Persistent thrombocytopenia at 4 today      Wounds (last 24 hours)     LDA Wound     Row Name 05/20/23 1600 05/20/23 1200 05/20/23 0800       Wound 05/19/23 0146 Left lumbar spine    Wound - Properties Group Placement Date: 05/19/23  -AS Placement Time: 0146  -AS Side: Left  -AS Location: lumbar spine  -AS    Retired Wound - Properties Group Placement Date: 05/19/23  -AS Placement Time: 0146  -AS Side: Left  -AS Location: lumbar spine  -AS    Retired Wound - Properties Group Date first assessed: 05/19/23  -AS Time first assessed: 0146  -AS Side: Left  -AS Location: lumbar spine  -AS       Rash 05/18/23 0000 Right anterior foot other (see comments)    Rash - Properties Group Placement Date: 05/18/23  -TM Placement Time: 0000  -TM Side: Right  -TM Orientation: anterior  -TM Location: foot  -TM Type: other (see comments)  -TM Additional Comments: petechial  -TM    Distribution localized   -AL localized  -AL localized  -AL    Color red  -AL red  -AL red  -AL    Configuration/Shape round;symmetrical  -AL round;symmetrical  -AL round;symmetrical  -AL    Borders distinct  -AL distinct  -AL distinct  -AL    Characteristics flat  -AL flat  -AL flat  -AL    Lesion Size pinpoint  -AL pinpoint  -AL pinpoint  -AL    Retired Rash - Properties Group Placement Date: 05/18/23  -TM Placement Time: 0000  -TM Side: Right  -TM Orientation: anterior  -TM Location: foot  -TM Type: other (see comments)  -TM Additional Comments: petechial  -TM    Retired Rash - Properties Group Date first assessed: 05/18/23  -TM Time first assessed: 0000  -TM Side: Right  -TM Orientation: anterior  -TM Location: foot  -TM Type: other (see comments)  -TM Additional Comments: petechial  -TM       Rash 05/18/23 0000 Left anterior foot other (see comments)    Rash - Properties Group Placement Date: 05/18/23  -TM Placement Time: 0000  -TM Side: Left  -TM Orientation: anterior  -TM Location: foot  -TM Type: other (see comments)  -TM Additional Comments: petichial  -TM    Distribution localized  -AL localized  -AL localized  -AL    Color red  -AL red  -AL red  -AL    Configuration/Shape round;symmetrical  -AL round;symmetrical  -AL round;symmetrical  -AL    Borders distinct  -AL distinct  -AL distinct  -AL    Characteristics flat  -AL flat  -AL flat  -AL    Lesion Size pinpoint  -AL pinpoint  -AL pinpoint  -AL    Retired Rash - Properties Group Placement Date: 05/18/23  -TM Placement Time: 0000  -TM Side: Left  -TM Orientation: anterior  -TM Location: foot  -TM Type: other (see comments)  -TM Additional Comments: petichial  -TM    Retired Rash - Properties Group Date first assessed: 05/18/23  -TM Time first assessed: 0000  -TM Side: Left  -TM Orientation: anterior  -TM Location: foot  -TM Type: other (see comments)  -TM Additional Comments: petichial  -TM       Rash 05/18/23 0000 lip other (see comments)    Rash - Properties Group Placement  Date: 05/18/23  -TM Placement Time: 0000  -TM Location: lip  -TM Type: other (see comments)  -TM Additional Comments: petechial  -TM    Distribution localized  -AL localized  -AL localized  -AL    Color color consistent with skin  -AL color consistent with skin  -AL color consistent with skin  -AL    Configuration/Shape asymmetric  -AL asymmetric  -AL asymmetric  -AL    Borders active  -AL active  -AL active  -AL    Characteristics raised  -AL raised  -AL raised  -AL    Lesion Size 0.5 to 1 cm  -AL 0.5 to 1 cm  -AL 0.5 to 1 cm  -AL    Care, Rash open to air  -AL open to air  -AL open to air  -AL    Retired Rash - Properties Group Placement Date: 05/18/23  -TM Placement Time: 0000  -TM Location: lip  -TM Type: other (see comments)  -TM Additional Comments: petechial  -TM    Retired Rash - Properties Group Date first assessed: 05/18/23  -TM Time first assessed: 0000  -TM Location: lip  -TM Type: other (see comments)  -TM Additional Comments: petechial  -TM    Row Name 05/20/23 0400 05/20/23 0000 05/19/23 2000       Wound 05/19/23 0146 Left lumbar spine    Wound - Properties Group Placement Date: 05/19/23  -AS Placement Time: 0146  -AS Side: Left  -AS Location: lumbar spine  -AS    Retired Wound - Properties Group Placement Date: 05/19/23  -AS Placement Time: 0146  -AS Side: Left  -AS Location: lumbar spine  -AS    Retired Wound - Properties Group Date first assessed: 05/19/23  -AS Time first assessed: 0146  -AS Side: Left  -AS Location: lumbar spine  -AS       Rash 05/18/23 0000 Right anterior foot other (see comments)    Rash - Properties Group Placement Date: 05/18/23  -TM Placement Time: 0000  -TM Side: Right  -TM Orientation: anterior  -TM Location: foot  -TM Type: other (see comments)  -TM Additional Comments: petechial  -TM    Distribution localized  -AB localized  -AB localized  -AB    Borders distinct  -AB distinct  -AB distinct  -AB    Characteristics flat  -AB flat  -AB flat  -AB    Lesion Size pinpoint  -AB  pinpoint  -AB pinpoint  -AB    Retired Rash - Properties Group Placement Date: 05/18/23  -TM Placement Time: 0000  -TM Side: Right  -TM Orientation: anterior  -TM Location: foot  -TM Type: other (see comments)  -TM Additional Comments: petechial  -TM    Retired Rash - Properties Group Date first assessed: 05/18/23  -TM Time first assessed: 0000  -TM Side: Right  -TM Orientation: anterior  -TM Location: foot  -TM Type: other (see comments)  -TM Additional Comments: petechial  -TM       Rash 05/18/23 0000 Left anterior foot other (see comments)    Rash - Properties Group Placement Date: 05/18/23  -TM Placement Time: 0000  -TM Side: Left  -TM Orientation: anterior  -TM Location: foot  -TM Type: other (see comments)  -TM Additional Comments: petichial  -TM    Distribution localized  -AB localized  -AB localized  -AB    Borders distinct  -AB distinct  -AB distinct  -AB    Characteristics flat  -AB flat  -AB flat  -AB    Lesion Size pinpoint  -AB pinpoint  -AB pinpoint  -AB    Retired Rash - Properties Group Placement Date: 05/18/23  -TM Placement Time: 0000  -TM Side: Left  -TM Orientation: anterior  -TM Location: foot  -TM Type: other (see comments)  -TM Additional Comments: petichial  -TM    Retired Rash - Properties Group Date first assessed: 05/18/23  -TM Time first assessed: 0000  -TM Side: Left  -TM Orientation: anterior  -TM Location: foot  -TM Type: other (see comments)  -TM Additional Comments: petichial  -TM       Rash 05/18/23 0000 lip other (see comments)    Rash - Properties Group Placement Date: 05/18/23  -TM Placement Time: 0000  -TM Location: lip  -TM Type: other (see comments)  -TM Additional Comments: petechial  -TM    Distribution localized  -AB localized  -AB localized  -AB    Borders active  -AB active  -AB active  -AB    Characteristics raised  -AB raised  -AB raised  -AB    Retired Rash - Properties Group Placement Date: 05/18/23  -TM Placement Time: 0000  -TM Location: lip  -TM Type: other (see  comments)  -TM Additional Comments: petechial  -TM    Retired Rash - Properties Group Date first assessed: 05/18/23  -TM Time first assessed: 0000  -TM Location: lip  -TM Type: other (see comments)  -TM Additional Comments: petechial  -TM          User Key  (r) = Recorded By, (t) = Taken By, (c) = Cosigned By    Initials Name Provider Type    Kaylie Vides, MAURAN Licensed Nurse    AS Lizzie Shepherd RN Registered Nurse    yCn Cross RN Registered Nurse    Tyson Denis RN Registered Nurse                  Assessment & Plan      allopurinol, 500 mg, Oral, Daily  budesonide-formoterol, 2 puff, Inhalation, BID - RT  citalopram, 10 mg, Oral, Daily  empagliflozin, 25 mg, Oral, Daily  furosemide, 40 mg, Oral, Daily  gabapentin, 300 mg, Oral, TID  insulin glargine, 20 Units, Subcutaneous, Nightly  insulin lispro, 2-7 Units, Subcutaneous, 4x Daily With Meals & Nightly  levoFLOXacin, 500 mg, Oral, Daily  losartan, 25 mg, Oral, Daily  metoprolol succinate XL, 25 mg, Oral, Daily  mirtazapine, 15 mg, Oral, Nightly  senna-docusate sodium, 2 tablet, Oral, BID  sodium chloride, 500 mL, Intravenous, Once  sodium chloride, 10 mL, Intravenous, Q12H       pain,          Active Hospital Problems:  Active Hospital Problems    Diagnosis    • **Pancytopenia    • Bilateral subdural hematomas    • Lumbar radiculopathy    • Traumatic subdural hemorrhage without loss of consciousness    • Chronic pain    • NICM (nonischemic cardiomyopathy)    • Type 2 diabetes mellitus with diabetic polyneuropathy, with long-term current use of insulin    • Depression with anxiety    • History of pulmonary embolism    • Medically noncompliant    • CML (chronic myelocytic leukemia)      Plan:   Pancytopenia  -WBC 2.6, Hgb 8.6, platelets 7  -received 1 unit of platelets in ambulatory care and developed a cough, chills, and nausea. Transfusion was stopped and patient was given IV decadron and bendadryl and sent to the ER  -plts now  8  -Solu-Medrol is given by recommendation of oncology  -start levaquin 500mg daily x7 days for prophylaxis per oncology notes  -neutropenic precautions  -oncology consulted      Remote subdural hemorrhage  -CT head: bilateral subdural hygromas suggesting remote subdural hemorrhage.    -Neurosurgery was consulted    CML  -per oncology notes no longer on Gleevec or hydroxyurea currently on Ponatinib 15mg followed      DM type 2  -glucose 375 at 11am  -check glucose AC/HS  -cont home lantus, premeal insulin, and SSI     Nonischemic Cardiomyopathy EF 44%  -cont home GDMT: BB, ARB, SGTL2, lasix     Chronic pain  -pain pump in place, managed by pain management outpatient. Pt just had pump adjusted yesterday      Anxiety  -cont home xanax    DVT prophylaxis:  Mechanical DVT prophylaxis orders are present.    CODE STATUS:    Level Of Support Discussed With: Patient  Code Status (Patient has no pulse and is not breathing): CPR (Attempt to Resuscitate)  Medical Interventions (Patient has pulse or is breathing): Full Support      Disposition:  I expect patient to be discharged 3 days.    This patient has been examined wearing appropriate Personal Protective Equipment and discussed with hospital infection control department. 05/20/23      Electronically signed by Wagner Eckert MD, 05/20/23, 16:50 EDT.  [facility]ist Team

## 2023-05-20 NOTE — PROGRESS NOTES
Hematology/Oncology Inpatient Progress Note    PATIENT NAME: Nieves Mc  : 1975  MRN: [id number]    CHIEF COMPLAINT:     HISTORY OF PRESENT ILLNESS:      Nieves Mc is a 47 y.o. female who presented to [facility] on 2023 with complaints of nausea, vomiting, coughing and chills during a platelet transfusion.  Past medical history of CML on ponatinib, cardiomyopathy, chronic pain, uncontrolled type 1 diabetes, chronic anemia, insomnia, anxiety and depression, pulmonary embolism on chronic anticoagulation with Xarelto, medical noncompliance.       Evaluation in the ED: Patient reported a fall 2 to 3 weeks ago during which she hit the right side of her head a CT of the head was done showing bilateral subdural hygromas suggesting remote subdural hemorrhage; local mass effect with sulcal and effacement and decreased of the size of lateral ventricles but no evidence of herniation; no acute intracranial hemorrhage.  The patient was admitted for further treatment of her pancytopenia and remote subdural hemorrhage with plans for pretreatment with IV Solu-Medrol, Tylenol and Benadryl prior to transfusing another unit of platelets.        23  Hematology/Oncology was consulted on a patient known to us and followed in the office by Dr. Lerma for her diagnosis of CML.  Patient initially presented in  when she was seen in consultation while hospitalized.  At that time the patient was on imatinib.  In  she had progressive thrombocytosis and was transition to nilotinib.  After that she was lost to follow-up until she was once again seen during a hospitalization in .  She was continued on nilotinib and hydroxyurea.  In 2022 she had once again been lost to follow-up for 3 months when she presented in the office after not taking nilotinib during that time.  She had shortness of breath and cough for which a 2D echocardiogram was done and showed a reduced EF of 41 to 45%.  Due to  cardiomyopathy she was transitioned to imatinib and hydroxyurea.  In November 2022 bone marrow biopsy showed her to be 18 accelerated phase CML and that the imatinib was not controlling her malignancy well and she was initiated on ponatinib 45 mg p.o. daily.  She had significant body pain with this dose and for that reason was reduced to 30 mg p.o. daily.  She was once again lost to follow-up for approximately 6 weeks during that time she self reduced to ponatinib 15 mg daily.  She was seen in the office on 5/17/2023 at which time her platelet count was noted to be 8000 and she was severely neutropenic.  Her ponatinib was held and she was sent to the ambulatory care unit for 2 units of platelets to be transfused and a platelet level to be checked posttransfusion for further planning.     Patient has a history of CML on ponatinib.  Patient has been noncompliant with lab evaluations as recommended.  She has been taking 50 mg of ponatinib.  She comes to the office with significant pancytopenia, severe neutropenia, platelets of 7000 and anemia.  Patient was sent to the ambulatory care for platelet transfusions, she developed chills nausea but did receive platelets.  Repeat platelet check was no significantly changed at 8000 for that reason patient was sent to the ER to be admitted to the hospital.  She has a history of falls approximately 2 weeks earlier.  While in the ER she had CT of the head which showed old subdural hematoma.  Patient denies nausea vomiting fevers or chills.  She has not been able to come to the office due to social issues, transportation issues     He/She  has a past medical history of Bone pain, COVID-19 virus infection (01/12/2022), Diabetes mellitus, Extremity pain, Leg pain, Migraine, Pulmonary embolism, and Vision loss.     PCP: Alida Latham APRN    Subjective    Weak. Fatigued. Not recovering the strength of the left lower extremity. Has petechiae.     ROS:    Review of Systems    Constitutional: Positive for activity change, appetite change, fatigue and unexpected weight change. Negative for chills, diaphoresis and fever.   HENT: Negative for congestion, dental problem, drooling, ear discharge, ear pain, facial swelling, hearing loss, mouth sores, nosebleeds, postnasal drip, rhinorrhea, sinus pressure, sinus pain, sneezing, sore throat, tinnitus, trouble swallowing and voice change.    Eyes: Negative for photophobia, pain, discharge, redness, itching and visual disturbance.   Respiratory: Negative for apnea, cough, choking, chest tightness, shortness of breath, wheezing and stridor.    Cardiovascular: Negative for chest pain, palpitations and leg swelling.   Gastrointestinal: Negative for abdominal distention, abdominal pain, anal bleeding, blood in stool, constipation, diarrhea, nausea, rectal pain and vomiting.   Endocrine: Negative for cold intolerance, heat intolerance, polydipsia and polyuria.   Genitourinary: Negative for decreased urine volume, difficulty urinating, dysuria, flank pain, frequency, genital sores, hematuria and urgency.   Musculoskeletal: Negative for arthralgias, back pain, gait problem, joint swelling, myalgias, neck pain and neck stiffness.   Skin: Negative for color change, pallor and rash.   Neurological: Positive for weakness. Negative for dizziness, tremors, seizures, syncope, facial asymmetry, speech difficulty, light-headedness, numbness and headaches.   Hematological: Negative for adenopathy. Does not bruise/bleed easily.   Psychiatric/Behavioral: Negative for agitation, behavioral problems, confusion, decreased concentration, hallucinations, self-injury, sleep disturbance and suicidal ideas. The patient is not nervous/anxious.         MEDICATIONS:    Scheduled Meds:  allopurinol, 500 mg, Oral, Daily  budesonide-formoterol, 2 puff, Inhalation, BID - RT  citalopram, 10 mg, Oral, Daily  empagliflozin, 25 mg, Oral, Daily  furosemide, 40 mg, Oral,  "Daily  gabapentin, 300 mg, Oral, TID  insulin glargine, 20 Units, Subcutaneous, Nightly  insulin lispro, 2-7 Units, Subcutaneous, 4x Daily With Meals & Nightly  levoFLOXacin, 500 mg, Oral, Daily  losartan, 25 mg, Oral, Daily  metoprolol succinate XL, 25 mg, Oral, Daily  mirtazapine, 15 mg, Oral, Nightly  senna-docusate sodium, 2 tablet, Oral, BID  sodium chloride, 10 mL, Intravenous, Q12H       Continuous Infusions:  pain,        PRN Meds:  •  acetaminophen **OR** acetaminophen **OR** acetaminophen  •  ALPRAZolam  •  senna-docusate sodium **AND** polyethylene glycol **AND** bisacodyl **AND** bisacodyl  •  Calcium Replacement - Follow Nurse / BPA Driven Protocol  •  dextrose  •  dextrose  •  glucagon (human recombinant)  •  hydrOXYzine  •  Magnesium Standard Dose Replacement - Follow Nurse / BPA Driven Protocol  •  melatonin  •  ondansetron **OR** ondansetron  •  Phosphorus Replacement - Follow Nurse / BPA Driven Protocol  •  Potassium Replacement - Follow Nurse / BPA Driven Protocol  •  prochlorperazine  •  sodium chloride  •  sodium chloride     ALLERGIES:  No Known Allergies    Objective    VITALS:   BP 94/60 (BP Location: Right arm, Patient Position: Lying)   Pulse 82   Temp 98 °F (36.7 °C) (Oral)   Resp 17   Ht 162.6 cm (64\")   Wt 56 kg (123 lb 7.3 oz)   LMP 05/25/2022 (Approximate)   SpO2 96%   BMI 21.19 kg/m²     PHYSICAL EXAM: (performed by MD)  Physical Exam  Constitutional:       General: She is not in acute distress.     Appearance: Normal appearance. She is ill-appearing. She is not toxic-appearing or diaphoretic.   HENT:      Head: Normocephalic and atraumatic.      Right Ear: External ear normal.      Left Ear: External ear normal.      Nose: Nose normal.      Mouth/Throat:      Mouth: Mucous membranes are moist.      Pharynx: Oropharynx is clear. No oropharyngeal exudate or posterior oropharyngeal erythema.      Comments: No bleeding. Coagulated blood in the hard palate and on the teeth. "   Eyes:      General: No scleral icterus.        Right eye: No discharge.         Left eye: No discharge.      Conjunctiva/sclera: Conjunctivae normal.      Pupils: Pupils are equal, round, and reactive to light.   Cardiovascular:      Rate and Rhythm: Normal rate and regular rhythm.      Pulses: Normal pulses.      Heart sounds: No murmur heard.    No friction rub. No gallop.   Pulmonary:      Effort: Pulmonary effort is normal. No respiratory distress.      Breath sounds: No stridor. No wheezing, rhonchi or rales.   Abdominal:      General: Abdomen is flat. Bowel sounds are normal. There is no distension.      Palpations: Abdomen is soft. There is no mass.      Tenderness: There is no abdominal tenderness. There is no right CVA tenderness, left CVA tenderness, guarding or rebound.      Hernia: No hernia is present.   Musculoskeletal:         General: No swelling, tenderness, deformity or signs of injury.      Cervical back: No rigidity.      Right lower leg: No edema.      Left lower leg: No edema.   Lymphadenopathy:      Cervical: No cervical adenopathy.   Skin:     Coloration: Skin is pale. Skin is not jaundiced.      Findings: No bruising, lesion or rash.   Neurological:      General: No focal deficit present.      Mental Status: She is alert and oriented to person, place, and time. Mental status is at baseline.      Cranial Nerves: No cranial nerve deficit.      Motor: No weakness.      Gait: Gait normal.   Psychiatric:         Mood and Affect: Mood normal.         Behavior: Behavior normal.         Thought Content: Thought content normal.         Judgment: Judgment normal.     I Yifan Truong MD on 5/20/2023 as documented above.       RECENT LABS:  Lab Results (last 24 hours)     Procedure Component Value Units Date/Time    POC Glucose Once [[id number]]  (Abnormal) Collected: 05/20/23 0855    Specimen: Blood Updated: 05/20/23 0858     Glucose 323 mg/dL      Comment: Serial Number: [id number]:   [id number]       CBC & Differential [[id number]]  (Abnormal) Collected: 05/20/23 0152    Specimen: Blood Updated: 05/20/23 0257    Narrative:      The following orders were created for panel order CBC & Differential.  Procedure                               Abnormality         Status                     ---------                               -----------         ------                     CBC Auto Differential[[id number]]        Abnormal            Final result               Scan Slide[[id number]]                                                                    Please view results for these tests on the individual orders.    CBC Auto Differential [[id number]]  (Abnormal) Collected: 05/20/23 0152    Specimen: Blood Updated: 05/20/23 0257     WBC 0.60 10*3/mm3      Comment: Per SOP, 1 manual differential per week on WBC of 0.6 to 1.0 done on 5.18.2023.          RBC 2.73 10*6/mm3      Hemoglobin 7.5 g/dL      Hematocrit 22.1 %      MCV 81.1 fL      MCH 27.5 pg      MCHC 34.0 g/dL      RDW 18.8 %      RDW-SD 55.6 fl      MPV 8.4 fL      Platelets 4 10*3/mm3      Comment: Reviewed by Pathologist within the past 30 days on 05.18.2023.          nRBC 1.0 /100 WBC     Magnesium [[id number]]  (Normal) Collected: 05/20/23 0152    Specimen: Blood Updated: 05/20/23 0237     Magnesium 1.6 mg/dL     Comprehensive Metabolic Panel [[id number]]  (Abnormal) Collected: 05/20/23 0152    Specimen: Blood Updated: 05/20/23 0237     Glucose 291 mg/dL      BUN 24 mg/dL      Creatinine 0.59 mg/dL      Sodium 143 mmol/L      Potassium 4.7 mmol/L      Chloride 104 mmol/L      CO2 28.0 mmol/L      Calcium 8.6 mg/dL      Total Protein 6.3 g/dL      Albumin 3.6 g/dL      ALT (SGPT) 22 U/L      AST (SGOT) 14 U/L      Alkaline Phosphatase 229 U/L      Total Bilirubin 1.1 mg/dL      Globulin 2.7 gm/dL      A/G Ratio 1.3 g/dL      BUN/Creatinine Ratio 40.7     Anion Gap 11.0 mmol/L      eGFR 112.0 mL/min/1.73     Narrative:      GFR Normal >60  Chronic Kidney  Disease <60  Kidney Failure <15      Potassium [[id number]]  (Normal) Collected: 05/19/23 2049    Specimen: Blood Updated: 05/19/23 2130     Potassium 5.2 mmol/L      Comment: Slight hemolysis detected by analyzer. Results may be affected.       POC Glucose Once [[id number]]  (Abnormal) Collected: 05/19/23 2051    Specimen: Blood Updated: 05/19/23 2052     Glucose 396 mg/dL      Comment: Serial Number: [id number]:  [id number]       POC Glucose Once [[id number]]  (Abnormal) Collected: 05/19/23 2049    Specimen: Blood Updated: 05/19/23 2050     Glucose 421 mg/dL      Comment: Serial Number: [id number]:  [id number]       POC Glucose Once [[id number]]  (Abnormal) Collected: 05/19/23 1658    Specimen: Blood Updated: 05/19/23 1712     Glucose 162 mg/dL      Comment: Serial Number: [id number]:  [id number]       Potassium [[id number]]  (Normal) Collected: 05/19/23 1151    Specimen: Blood Updated: 05/19/23 1224     Potassium 4.2 mmol/L     Narrative:      Result checked      HLA Antibody Identification Class I High Resolution - Miscellaneous Test [[id number]] Collected: 05/19/23 1151    Specimen: Blood Updated: 05/19/23 1156    POC Glucose Once [[id number]]  (Abnormal) Collected: 05/19/23 1141    Specimen: Blood Updated: 05/19/23 1143     Glucose 162 mg/dL      Comment: Serial Number: [id number]:  [id number]           IMAGING REVIEWED:  No radiology results for the last day    Assessment & Plan      ASSESSMENT:    1. Pancytopenia: Secondary to ponatinib, bone marrow depression.  She has had decrease in the size of her spleen.  She has developed significant thrombocytopenia not responsive to platelet transfusions likely due to TKI, likely fibrosis of the bone marrow from CML.  HLA typing of the platelets have been ordered.  Continue to transfuse to keep platelets between 15 and 20,000 if possible is due to recent subdural hematoma  2. Accelerated phase CML, status post bone marrow biopsy on 11/3/2022.   Initiated on ponatinib and required dose reduction due to generalized body pain.  Was taking ponatinib 15 mg prior to hospitalization.  3. Bilateral subdural hematomas: Evaluated by neurosurgery with plan for a follow-up CT of the head in 2 weeks.  Neurologically she is stable  4. Multiple chronic conditions: Type 2 diabetes mellitus, nonischemic cardiomyopathy, chronic pain, depression and anxiety     PLANS    1. Off of treatment and anticoagulation for now.   2. Called the blood bank to find out when the HLA matched platelets are available. Perhaps not before 5/23. Will transfuse 2 units of single donor platelets today.   3. Discussed with her and her spouse.   4. Her prognosis at this point is reserved.      Yifan Truong MD on 5/20/2023 as documented above.

## 2023-05-21 LAB
ALBUMIN SERPL-MCNC: 4 G/DL (ref 3.5–5.2)
ALBUMIN/GLOB SERPL: 1.2 G/DL
ALP SERPL-CCNC: 282 U/L (ref 39–117)
ALT SERPL W P-5'-P-CCNC: 36 U/L (ref 1–33)
ANION GAP SERPL CALCULATED.3IONS-SCNC: 14 MMOL/L (ref 5–15)
APTT PPP: 27 SECONDS (ref 24–31)
AST SERPL-CCNC: 26 U/L (ref 1–32)
BH BB BLOOD EXPIRATION DATE: NORMAL
BH BB BLOOD TYPE BARCODE: 5100
BH BB BLOOD TYPE BARCODE: 6200
BH BB BLOOD TYPE BARCODE: 6200
BH BB DISPENSE STATUS: NORMAL
BH BB PRODUCT CODE: NORMAL
BH BB UNIT NUMBER: NORMAL
BILIRUB SERPL-MCNC: 2 MG/DL (ref 0–1.2)
BUN SERPL-MCNC: 23 MG/DL (ref 6–20)
BUN/CREAT SERPL: 34.8 (ref 7–25)
CALCIUM SPEC-SCNC: 9.1 MG/DL (ref 8.6–10.5)
CHLORIDE SERPL-SCNC: 104 MMOL/L (ref 98–107)
CO2 SERPL-SCNC: 25 MMOL/L (ref 22–29)
CREAT SERPL-MCNC: 0.66 MG/DL (ref 0.57–1)
CROSSMATCH INTERPRETATION: NORMAL
DEPRECATED RDW RBC AUTO: 50.3 FL (ref 37–54)
EGFRCR SERPLBLD CKD-EPI 2021: 109 ML/MIN/1.73
ERYTHROCYTE [DISTWIDTH] IN BLOOD BY AUTOMATED COUNT: 17.3 % (ref 12.3–15.4)
GLOBULIN UR ELPH-MCNC: 3.3 GM/DL
GLUCOSE BLDC GLUCOMTR-MCNC: 210 MG/DL (ref 70–105)
GLUCOSE BLDC GLUCOMTR-MCNC: 236 MG/DL (ref 70–105)
GLUCOSE BLDC GLUCOMTR-MCNC: 245 MG/DL (ref 70–105)
GLUCOSE BLDC GLUCOMTR-MCNC: 282 MG/DL (ref 70–105)
GLUCOSE BLDC GLUCOMTR-MCNC: 506 MG/DL (ref 70–105)
GLUCOSE SERPL-MCNC: 525 MG/DL (ref 65–99)
HBA1C MFR BLD: 7.6 % (ref 4.8–5.6)
HCT VFR BLD AUTO: 23.5 % (ref 34–46.6)
HGB BLD-MCNC: 7.9 G/DL (ref 12–15.9)
INR PPP: 0.96 (ref 0.93–1.1)
MCH RBC QN AUTO: 28.1 PG (ref 26.6–33)
MCHC RBC AUTO-ENTMCNC: 33.7 G/DL (ref 31.5–35.7)
MCV RBC AUTO: 83.5 FL (ref 79–97)
NRBC BLD AUTO-RTO: 0.6 /100 WBC (ref 0–0.2)
PLATELET # BLD AUTO: 4 10*3/MM3 (ref 140–450)
PMV BLD AUTO: 9.7 FL (ref 6–12)
POTASSIUM SERPL-SCNC: 4.3 MMOL/L (ref 3.5–5.2)
PROT SERPL-MCNC: 7.3 G/DL (ref 6–8.5)
PROTHROMBIN TIME: 10.3 SECONDS (ref 9.6–11.7)
RBC # BLD AUTO: 2.81 10*6/MM3 (ref 3.77–5.28)
SODIUM SERPL-SCNC: 143 MMOL/L (ref 136–145)
UNIT  ABO: NORMAL
UNIT  RH: NORMAL
WBC NRBC COR # BLD: 0.5 10*3/MM3 (ref 3.4–10.8)

## 2023-05-21 PROCEDURE — 63710000001 INSULIN GLARGINE PER 5 UNITS

## 2023-05-21 PROCEDURE — 85610 PROTHROMBIN TIME: CPT | Performed by: INTERNAL MEDICINE

## 2023-05-21 PROCEDURE — 83036 HEMOGLOBIN GLYCOSYLATED A1C: CPT | Performed by: INTERNAL MEDICINE

## 2023-05-21 PROCEDURE — 85730 THROMBOPLASTIN TIME PARTIAL: CPT | Performed by: INTERNAL MEDICINE

## 2023-05-21 PROCEDURE — 99232 SBSQ HOSP IP/OBS MODERATE 35: CPT | Performed by: INTERNAL MEDICINE

## 2023-05-21 PROCEDURE — 82948 REAGENT STRIP/BLOOD GLUCOSE: CPT

## 2023-05-21 PROCEDURE — 80053 COMPREHEN METABOLIC PANEL: CPT | Performed by: INTERNAL MEDICINE

## 2023-05-21 PROCEDURE — 63710000001 INSULIN LISPRO (HUMAN) PER 5 UNITS

## 2023-05-21 PROCEDURE — 85025 COMPLETE CBC W/AUTO DIFF WBC: CPT | Performed by: INTERNAL MEDICINE

## 2023-05-21 RX ADMIN — CITALOPRAM HYDROBROMIDE 10 MG: 20 TABLET ORAL at 08:36

## 2023-05-21 RX ADMIN — Medication 10 ML: at 23:06

## 2023-05-21 RX ADMIN — LEVOFLOXACIN 500 MG: 500 TABLET, FILM COATED ORAL at 08:36

## 2023-05-21 RX ADMIN — ACETAMINOPHEN 650 MG: 325 TABLET, FILM COATED ORAL at 16:07

## 2023-05-21 RX ADMIN — GABAPENTIN 300 MG: 300 CAPSULE ORAL at 08:36

## 2023-05-21 RX ADMIN — INSULIN LISPRO 3 UNITS: 100 INJECTION, SOLUTION INTRAVENOUS; SUBCUTANEOUS at 23:25

## 2023-05-21 RX ADMIN — EMPAGLIFLOZIN 25 MG: 25 TABLET, FILM COATED ORAL at 08:36

## 2023-05-21 RX ADMIN — METOPROLOL SUCCINATE 25 MG: 25 TABLET, EXTENDED RELEASE ORAL at 08:36

## 2023-05-21 RX ADMIN — MIRTAZAPINE 15 MG: 15 TABLET, FILM COATED ORAL at 23:05

## 2023-05-21 RX ADMIN — INSULIN LISPRO 9 UNITS: 100 INJECTION, SOLUTION INTRAVENOUS; SUBCUTANEOUS at 12:27

## 2023-05-21 RX ADMIN — FUROSEMIDE 40 MG: 40 TABLET ORAL at 08:35

## 2023-05-21 RX ADMIN — GABAPENTIN 300 MG: 300 CAPSULE ORAL at 23:05

## 2023-05-21 RX ADMIN — LOSARTAN POTASSIUM 25 MG: 25 TABLET, FILM COATED ORAL at 08:36

## 2023-05-21 RX ADMIN — INSULIN GLARGINE 14 UNITS: 100 INJECTION, SOLUTION SUBCUTANEOUS at 00:31

## 2023-05-21 RX ADMIN — INSULIN LISPRO 2 UNITS: 100 INJECTION, SOLUTION INTRAVENOUS; SUBCUTANEOUS at 00:29

## 2023-05-21 RX ADMIN — ALLOPURINOL 500 MG: 300 TABLET ORAL at 08:35

## 2023-05-21 RX ADMIN — ACETAMINOPHEN 650 MG: 325 TABLET, FILM COATED ORAL at 23:25

## 2023-05-21 RX ADMIN — INSULIN GLARGINE 18 UNITS: 100 INJECTION, SOLUTION SUBCUTANEOUS at 23:27

## 2023-05-21 RX ADMIN — INSULIN LISPRO 9 UNITS: 100 INJECTION, SOLUTION INTRAVENOUS; SUBCUTANEOUS at 08:37

## 2023-05-21 RX ADMIN — INSULIN LISPRO 7 UNITS: 100 INJECTION, SOLUTION INTRAVENOUS; SUBCUTANEOUS at 17:48

## 2023-05-21 RX ADMIN — Medication 10 ML: at 08:38

## 2023-05-21 RX ADMIN — ALPRAZOLAM 0.5 MG: 0.5 TABLET ORAL at 23:05

## 2023-05-21 RX ADMIN — GABAPENTIN 300 MG: 300 CAPSULE ORAL at 16:07

## 2023-05-21 NOTE — PROGRESS NOTES
Hematology/Oncology Inpatient Progress Note    PATIENT NAME: Nieves Mc  : 1975  MRN: [id number]    CHIEF COMPLAINT:     HISTORY OF PRESENT ILLNESS:      Nieves Mc is a 47 y.o. female who presented to [facility] on 2023 with complaints of nausea, vomiting, coughing and chills during a platelet transfusion.  Past medical history of CML on ponatinib, cardiomyopathy, chronic pain, uncontrolled type 1 diabetes, chronic anemia, insomnia, anxiety and depression, pulmonary embolism on chronic anticoagulation with Xarelto, medical noncompliance.       Evaluation in the ED: Patient reported a fall 2 to 3 weeks ago during which she hit the right side of her head a CT of the head was done showing bilateral subdural hygromas suggesting remote subdural hemorrhage; local mass effect with sulcal and effacement and decreased of the size of lateral ventricles but no evidence of herniation; no acute intracranial hemorrhage.  The patient was admitted for further treatment of her pancytopenia and remote subdural hemorrhage with plans for pretreatment with IV Solu-Medrol, Tylenol and Benadryl prior to transfusing another unit of platelets.        23  Hematology/Oncology was consulted on a patient known to us and followed in the office by Dr. Lerma for her diagnosis of CML.  Patient initially presented in  when she was seen in consultation while hospitalized.  At that time the patient was on imatinib.  In  she had progressive thrombocytosis and was transition to nilotinib.  After that she was lost to follow-up until she was once again seen during a hospitalization in .  She was continued on nilotinib and hydroxyurea.  In 2022 she had once again been lost to follow-up for 3 months when she presented in the office after not taking nilotinib during that time.  She had shortness of breath and cough for which a 2D echocardiogram was done and showed a reduced EF of 41 to 45%.  Due to  cardiomyopathy she was transitioned to imatinib and hydroxyurea.  In November 2022 bone marrow biopsy showed her to be 18 accelerated phase CML and that the imatinib was not controlling her malignancy well and she was initiated on ponatinib 45 mg p.o. daily.  She had significant body pain with this dose and for that reason was reduced to 30 mg p.o. daily.  She was once again lost to follow-up for approximately 6 weeks during that time she self reduced to ponatinib 15 mg daily.  She was seen in the office on 5/17/2023 at which time her platelet count was noted to be 8000 and she was severely neutropenic.  Her ponatinib was held and she was sent to the ambulatory care unit for 2 units of platelets to be transfused and a platelet level to be checked posttransfusion for further planning.     Patient has a history of CML on ponatinib.  Patient has been noncompliant with lab evaluations as recommended.  She has been taking 50 mg of ponatinib.  She comes to the office with significant pancytopenia, severe neutropenia, platelets of 7000 and anemia.  Patient was sent to the ambulatory care for platelet transfusions, she developed chills nausea but did receive platelets.  Repeat platelet check was no significantly changed at 8000 for that reason patient was sent to the ER to be admitted to the hospital.  She has a history of falls approximately 2 weeks earlier.  While in the ER she had CT of the head which showed old subdural hematoma.  Patient denies nausea vomiting fevers or chills.  She has not been able to come to the office due to social issues, transportation issues     He/She  has a past medical history of Bone pain, COVID-19 virus infection (01/12/2022), Diabetes mellitus, Extremity pain, Leg pain, Migraine, Pulmonary embolism, and Vision loss.     PCP: Alida Latham APRN    Subjective    5/21/2023: Tearful. Anxious and worried about the future. Noted more petechiae on her right upper extremity. No epistaxis.  Able to eat some and no nausea.     ROS:    Review of Systems   Constitutional: Positive for activity change, appetite change, fatigue and unexpected weight change. Negative for chills, diaphoresis and fever.   HENT: Negative for congestion, dental problem, drooling, ear discharge, ear pain, facial swelling, hearing loss, mouth sores, nosebleeds, postnasal drip, rhinorrhea, sinus pressure, sinus pain, sneezing, sore throat, tinnitus, trouble swallowing and voice change.    Eyes: Negative for photophobia, pain, discharge, redness, itching and visual disturbance.   Respiratory: Negative for apnea, cough, choking, chest tightness, shortness of breath, wheezing and stridor.    Cardiovascular: Negative for chest pain, palpitations and leg swelling.   Gastrointestinal: Negative for abdominal distention, abdominal pain, anal bleeding, blood in stool, constipation, diarrhea, nausea, rectal pain and vomiting.   Endocrine: Negative for cold intolerance, heat intolerance, polydipsia and polyuria.   Genitourinary: Negative for decreased urine volume, difficulty urinating, dysuria, flank pain, frequency, genital sores, hematuria and urgency.   Musculoskeletal: Negative for arthralgias, back pain, gait problem, joint swelling, myalgias, neck pain and neck stiffness.   Skin: Negative for color change, pallor and rash.   Neurological: Positive for weakness. Negative for dizziness, tremors, seizures, syncope, facial asymmetry, speech difficulty, light-headedness, numbness and headaches.   Hematological: Negative for adenopathy. Does not bruise/bleed easily.   Psychiatric/Behavioral: Negative for agitation, behavioral problems, confusion, decreased concentration, hallucinations, self-injury, sleep disturbance and suicidal ideas. The patient is not nervous/anxious.       MEDICATIONS:    Scheduled Meds:  allopurinol, 500 mg, Oral, Daily  budesonide-formoterol, 2 puff, Inhalation, BID - RT  citalopram, 10 mg, Oral, Daily  empagliflozin,  "25 mg, Oral, Daily  furosemide, 40 mg, Oral, Daily  gabapentin, 300 mg, Oral, TID  insulin glargine, 1-200 Units, Subcutaneous, Nightly - Glucommander  insulin lispro, 1-200 Units, Subcutaneous, 4x Daily With Meals & Nightly  levoFLOXacin, 500 mg, Oral, Daily  losartan, 25 mg, Oral, Daily  metoprolol succinate XL, 25 mg, Oral, Daily  mirtazapine, 15 mg, Oral, Nightly  senna-docusate sodium, 2 tablet, Oral, BID  sodium chloride, 10 mL, Intravenous, Q12H       Continuous Infusions:  pain,        PRN Meds:  •  acetaminophen **OR** acetaminophen **OR** acetaminophen  •  ALPRAZolam  •  senna-docusate sodium **AND** polyethylene glycol **AND** bisacodyl **AND** bisacodyl  •  Calcium Replacement - Follow Nurse / BPA Driven Protocol  •  dextrose  •  dextrose  •  glucagon (human recombinant)  •  hydrocortisone  •  hydrOXYzine  •  insulin lispro  •  Magnesium Standard Dose Replacement - Follow Nurse / BPA Driven Protocol  •  melatonin  •  ondansetron **OR** ondansetron  •  Phosphorus Replacement - Follow Nurse / BPA Driven Protocol  •  Potassium Replacement - Follow Nurse / BPA Driven Protocol  •  prochlorperazine  •  sodium chloride  •  sodium chloride     ALLERGIES:  No Known Allergies    Objective    VITALS:   /71 (BP Location: Right arm, Patient Position: Lying)   Pulse 86   Temp 98.3 °F (36.8 °C) (Oral)   Resp 16   Ht 162.6 cm (64\")   Wt 56.3 kg (124 lb 1.9 oz)   LMP 05/25/2022 (Approximate)   SpO2 96%   BMI 21.30 kg/m²     PHYSICAL EXAM: (performed by MD)  Physical Exam  Constitutional:       General: She is not in acute distress.     Appearance: Normal appearance. She is ill-appearing. She is not toxic-appearing or diaphoretic.   HENT:      Head: Normocephalic and atraumatic.      Right Ear: External ear normal.      Left Ear: External ear normal.      Nose: Nose normal.      Mouth/Throat:      Mouth: Mucous membranes are moist.      Pharynx: Oropharynx is clear. No oropharyngeal exudate or posterior " oropharyngeal erythema.      Comments: No bleeding. Coagulated blood in the hard palate and on the teeth.   Eyes:      General: No scleral icterus.        Right eye: No discharge.         Left eye: No discharge.      Conjunctiva/sclera: Conjunctivae normal.      Pupils: Pupils are equal, round, and reactive to light.   Cardiovascular:      Rate and Rhythm: Normal rate and regular rhythm.      Pulses: Normal pulses.      Heart sounds: No murmur heard.    No friction rub. No gallop.   Pulmonary:      Effort: Pulmonary effort is normal. No respiratory distress.      Breath sounds: No stridor. No wheezing, rhonchi or rales.   Abdominal:      General: Abdomen is flat. Bowel sounds are normal. There is no distension.      Palpations: Abdomen is soft. There is no mass.      Tenderness: There is no abdominal tenderness. There is no right CVA tenderness, left CVA tenderness, guarding or rebound.      Hernia: No hernia is present.   Musculoskeletal:         General: No swelling, tenderness, deformity or signs of injury.      Cervical back: No rigidity.      Right lower leg: No edema.      Left lower leg: No edema.   Lymphadenopathy:      Cervical: No cervical adenopathy.   Skin:     Coloration: Skin is pale. Skin is not jaundiced.      Findings: No bruising, lesion or rash.   Neurological:      General: No focal deficit present.      Mental Status: She is alert and oriented to person, place, and time. Mental status is at baseline.      Cranial Nerves: No cranial nerve deficit.      Motor: No weakness.      Gait: Gait normal.   Psychiatric:         Mood and Affect: Mood normal.         Behavior: Behavior normal.         Thought Content: Thought content normal.         Judgment: Judgment normal.     JAMEL Truong MD performed the physical exam on 5/21/2023 as documented above.     RECENT LABS:  Lab Results (last 24 hours)     Procedure Component Value Units Date/Time    Hemoglobin A1c [[id number]]  (Abnormal) Collected:  05/21/23 0721    Specimen: Blood Updated: 05/21/23 0821     Hemoglobin A1C 7.60 %     Comprehensive Metabolic Panel [[id number]]  (Abnormal) Collected: 05/21/23 0721    Specimen: Blood Updated: 05/21/23 0809     Glucose 525 mg/dL      BUN 23 mg/dL      Creatinine 0.66 mg/dL      Sodium 143 mmol/L      Potassium 4.3 mmol/L      Chloride 104 mmol/L      CO2 25.0 mmol/L      Calcium 9.1 mg/dL      Total Protein 7.3 g/dL      Albumin 4.0 g/dL      ALT (SGPT) 36 U/L      AST (SGOT) 26 U/L      Alkaline Phosphatase 282 U/L      Total Bilirubin 2.0 mg/dL      Globulin 3.3 gm/dL      A/G Ratio 1.2 g/dL      BUN/Creatinine Ratio 34.8     Anion Gap 14.0 mmol/L      eGFR 109.0 mL/min/1.73     Narrative:      GFR Normal >60  Chronic Kidney Disease <60  Kidney Failure <15      aPTT [[id number]]  (Normal) Collected: 05/21/23 0721    Specimen: Blood Updated: 05/21/23 0745     PTT 27.0 seconds     Protime-INR [[id number]]  (Normal) Collected: 05/21/23 0721    Specimen: Blood Updated: 05/21/23 0745     Protime 10.3 Seconds      INR 0.96    CBC & Differential [[id number]]  (Abnormal) Collected: 05/21/23 0721    Specimen: Blood Updated: 05/21/23 0744    Narrative:      The following orders were created for panel order CBC & Differential.  Procedure                               Abnormality         Status                     ---------                               -----------         ------                     CBC Auto Differential[[id number]]        Abnormal            Final result               Scan Slide[[id number]]                                                                    Please view results for these tests on the individual orders.    CBC Auto Differential [[id number]]  (Abnormal) Collected: 05/21/23 0721    Specimen: Blood Updated: 05/21/23 0744     WBC 0.50 10*3/mm3      RBC 2.81 10*6/mm3      Hemoglobin 7.9 g/dL      Hematocrit 23.5 %      MCV 83.5 fL      MCH 28.1 pg      MCHC 33.7 g/dL      RDW 17.3 %      RDW-SD 50.3 fl       MPV 9.7 fL      Platelets 4 10*3/mm3      nRBC 0.6 /100 WBC     POC Glucose Once [[id number]]  (Abnormal) Collected: 05/21/23 0719    Specimen: Blood Updated: 05/21/23 0721     Glucose 506 mg/dL      Comment: Serial Number: [id number]:  [id number]       POC Glucose Once [[id number]]  (Abnormal) Collected: 05/20/23 2218    Specimen: Blood Updated: 05/20/23 2220     Glucose 267 mg/dL      Comment: Serial Number: [id number]:  [id number]       CBC & Differential [[id number]]  (Abnormal) Collected: 05/20/23 1800    Specimen: Blood Updated: 05/20/23 1941    Narrative:      The following orders were created for panel order CBC & Differential.  Procedure                               Abnormality         Status                     ---------                               -----------         ------                     CBC Auto Differential[[id number]]        Abnormal            Final result               Scan Slide[[id number]]                                                                    Please view results for these tests on the individual orders.    CBC Auto Differential [[id number]]  (Abnormal) Collected: 05/20/23 1800    Specimen: Blood Updated: 05/20/23 1941     WBC 0.40 10*3/mm3      RBC 2.42 10*6/mm3      Hemoglobin 6.6 g/dL      Hematocrit 20.0 %      MCV 82.6 fL      MCH 27.3 pg      MCHC 33.0 g/dL      RDW 18.8 %      RDW-SD 57.3 fl      MPV 8.0 fL      Platelets 3 10*3/mm3      nRBC 0.3 /100 WBC     POC Glucose Once [[id number]]  (Abnormal) Collected: 05/20/23 1821    Specimen: Blood Updated: 05/20/23 1824     Glucose 285 mg/dL      Comment: Serial Number: [id number]:  [id number]       Manual Differential [[id number]]  (Abnormal) Collected: 05/20/23 1413    Specimen: Blood Updated: 05/20/23 1459     Neutrophil % 2.0 %      Lymphocyte % 98.0 %      Neutrophils Absolute 0.02 10*3/mm3      Lymphocytes Absolute 1.08 10*3/mm3      RBC Morphology Normal     WBC Morphology Normal     Platelet Estimate  Decreased    CBC & Differential [[id number]]  (Abnormal) Collected: 05/20/23 1413    Specimen: Blood Updated: 05/20/23 1458    Narrative:      The following orders were created for panel order CBC & Differential.  Procedure                               Abnormality         Status                     ---------                               -----------         ------                     CBC Auto Differential[[id number]]        Abnormal            Final result               Scan Slide[[id number]]                                       Final result                 Please view results for these tests on the individual orders.    Scan Slide [[id number]] Collected: 05/20/23 1413    Specimen: Blood Updated: 05/20/23 1458     Scan Slide --     Comment: See Manual Differential Results       CBC Auto Differential [[id number]]  (Abnormal) Collected: 05/20/23 1413    Specimen: Blood Updated: 05/20/23 1458     WBC 1.10 10*3/mm3      RBC 2.77 10*6/mm3      Hemoglobin 7.8 g/dL      Hematocrit 22.7 %      MCV 81.9 fL      MCH 28.0 pg      MCHC 34.2 g/dL      RDW 19.0 %      RDW-SD 56.9 fl      MPV 8.9 fL      Platelets 4 10*3/mm3     Narrative:      The previously reported component NRBC is no longer being reported. Previous result was 0.8 /100 WBC (Reference Range: 0.0-0.2 /100 WBC) on 5/20/2023 at 1456 EDT.    POC Glucose Once [[id number]]  (Abnormal) Collected: 05/20/23 1242    Specimen: Blood Updated: 05/20/23 1244     Glucose 163 mg/dL      Comment: Serial Number: [id number]:  [id number]           IMAGING REVIEWED:  No radiology results for the last day    Assessment & Plan      ASSESSMENT:    1. Pancytopenia: Secondary to ponatinib, bone marrow depression.  She has had decrease in the size of her spleen.  She has developed significant thrombocytopenia not responsive to platelet transfusions likely due to TKI, likely fibrosis of the bone marrow from CML.  HLA typing of the platelets have been ordered.  Continue to transfuse to  keep platelets between 15 and 20,000 if possible is due to recent subdural hematoma  2. Accelerated phase CML, status post bone marrow biopsy on 11/3/2022.  Initiated on ponatinib and required dose reduction due to generalized body pain.  Was taking ponatinib 15 mg prior to hospitalization.  3. Bilateral subdural hematomas: Evaluated by neurosurgery with plan for a follow-up CT of the head in 2 weeks.  Neurologically she is stable  4. Multiple chronic conditions: Type 2 diabetes mellitus, nonischemic cardiomyopathy, chronic pain, depression and anxiety     PLANS    1. HLA matched platelets not available yet. Likely available on 5/22 or 5/23. Discussed with her and with her spouse at length. She is worried about her prognosis; discussed with her at length the options. For now will hold off on transfusion, since there has been no improvement in the platelet count in spite of the previous transfusions.   2. Will continue to monitor closely.      Yifan Truong MD on 5/21/2023 at 10:35 AM.

## 2023-05-21 NOTE — PROGRESS NOTES
[facility] Medicine Services   Daily Progress Note    Patient Name: Nieves Mc  : 1975  MRN: [id number]  Primary Care Physician:  Alida Latham APRN  Date of admission: 2023  Date and Time of Service: 2023     Subjective      Chief Complaint: Left leg heaviness, low platelets, transfusion of platelet transfusion as outpatient, for 3 weeks back.      Patient Reports this patient has history of CML, she is getting chemotherapy at home, she has not been very good at compliant and blood check as outpatient, she fell 3 weeks back and she reported that since then she has lower back pain and she felt heaviness on her left leg, patient was found to have what appears to be old subdural hematoma and neurosurgery are consulted.  Also patient had pancytopenia she received blood transfusion today and platelet transfusion also consult hematology oncology.    2023: Today potassium was 2.8 and was replaced with p.o. and IV potassium, platelets are severely low at 7, neurosurgical work-up for right leg heaviness and subdural hematoma was not completed due to the patient implanted pain pump and very low platelets patient stable otherwise neurologically.      2023: Patient has multiple previous and she has also petechia especially on the right arm with a blood pressure cuff was placed, she will have any blood in the stool or urine, platelets are severely low and orders for placing platelets were placed by hematology    2023: Patient received 2 bags of platelets yesterday and platelets are not improving, platelet count remains 4, Dr. Truong discussed with patient HLA matched platelets which is not available to her yet, also discussed comfort care planning.  Otherwise patient remained stable with some hyperglycemia and severe pancytopenia.  About her leg heaviness/pain neurosurgery work-up could not be completed due to severe thrombocytopenia and the presence of pain pump so  MRI or CT myelogram could not be obtained.    ROS   all review of systems are negative except mentioned above      Objective      Vitals:   Temp:  [97.8 °F (36.6 °C)-99.5 °F (37.5 °C)] 98.3 °F (36.8 °C)  Heart Rate:  [] 101  Resp:  [14-20] 16  BP: ()/(28-99) 133/96    Physical Exam  Constitutional:       Appearance: Normal appearance.   HENT:      Head: Normocephalic and atraumatic.      Right Ear: Tympanic membrane normal.      Left Ear: Tympanic membrane normal.      Nose: Nose normal.      Mouth/Throat:      Mouth: Mucous membranes are moist.   Eyes:      Extraocular Movements: Extraocular movements intact.      Pupils: Pupils are equal, round, and reactive to light.   Cardiovascular:      Rate and Rhythm: Normal rate.      Pulses: Normal pulses.   Pulmonary:      Effort: Pulmonary effort is normal.   Abdominal:      General: Abdomen is flat.   Musculoskeletal:         General: Normal range of motion.      Cervical back: Normal range of motion.   Skin:     General: Skin is warm.      Capillary Refill: Capillary refill takes less than 2 seconds.   Neurological:      General: No focal deficit present.      Mental Status: She is alert.   Psychiatric:         Mood and Affect: Mood normal.             Result Review    Result Review:  I have personally reviewed the results from the time of this admission to 5/21/2023 12:07 EDT and agree with these findings:  [x]  Laboratory  []  Microbiology  []  Radiology  []  EKG/Telemetry   []  Cardiology/Vascular   []  Pathology  []  Old records  []  Other:  Most notable findings include: Persistent thrombocytopenia at 4 today      Wounds (last 24 hours)     LDA Wound     Row Name 05/21/23 0400 05/21/23 0000 05/20/23 2000       Wound 05/19/23 0146 Left lumbar spine    Wound - Properties Group Placement Date: 05/19/23  -AS Placement Time: 0146  -AS Side: Left  -AS Location: lumbar spine  -AS    Dressing Care open to air  -EW open to air  -EW open to air  -EW    Retired  Wound - Properties Group Placement Date: 05/19/23  -AS Placement Time: 0146  -AS Side: Left  -AS Location: lumbar spine  -AS    Retired Wound - Properties Group Date first assessed: 05/19/23  -AS Time first assessed: 0146  -AS Side: Left  -AS Location: lumbar spine  -AS       Rash 05/18/23 0000 Right anterior foot other (see comments)    Rash - Properties Group Placement Date: 05/18/23  -TM Placement Time: 0000  -TM Side: Right  -TM Orientation: anterior  -TM Location: foot  -TM Type: other (see comments)  -TM Additional Comments: petechial  -TM    Color -- -- red  -EW    Characteristics flat  -EW flat  -EW flat  -EW    Care, Rash open to air  -EW open to air  -EW open to air  -EW    Retired Rash - Properties Group Placement Date: 05/18/23  -TM Placement Time: 0000  -TM Side: Right  -TM Orientation: anterior  -TM Location: foot  -TM Type: other (see comments)  -TM Additional Comments: petechial  -TM    Retired Rash - Properties Group Date first assessed: 05/18/23  -TM Time first assessed: 0000  -TM Side: Right  -TM Orientation: anterior  -TM Location: foot  -TM Type: other (see comments)  -TM Additional Comments: petechial  -TM       Rash 05/18/23 0000 Left anterior foot other (see comments)    Rash - Properties Group Placement Date: 05/18/23  -TM Placement Time: 0000  -TM Side: Left  -TM Orientation: anterior  -TM Location: foot  -TM Type: other (see comments)  -TM Additional Comments: petichial  -TM    Color -- -- red  -EW    Characteristics flat  -EW flat  -EW flat  -EW    Care, Rash open to air  -EW open to air  -EW open to air  -EW    Retired Rash - Properties Group Placement Date: 05/18/23  -TM Placement Time: 0000  -TM Side: Left  -TM Orientation: anterior  -TM Location: foot  -TM Type: other (see comments)  -TM Additional Comments: petichial  -TM    Retired Rash - Properties Group Date first assessed: 05/18/23  -TM Time first assessed: 0000  -TM Side: Left  -TM Orientation: anterior  -TM Location: foot  -TM  Type: other (see comments)  -TM Additional Comments: petichial  -TM       Rash 05/18/23 0000 lip other (see comments)    Rash - Properties Group Placement Date: 05/18/23  -TM Placement Time: 0000  -TM Location: lip  -TM Type: other (see comments)  -TM Additional Comments: petechial  -TM    Distribution localized  -EW localized  -EW localized  -EW    Color -- -- red  -EW    Configuration/Shape -- -- asymmetric  -EW    Care, Rash open to air  -EW open to air  -EW open to air  -EW    Retired Rash - Properties Group Placement Date: 05/18/23  -TM Placement Time: 0000  -TM Location: lip  -TM Type: other (see comments)  -TM Additional Comments: petechial  -TM    Retired Rash - Properties Group Date first assessed: 05/18/23  -TM Time first assessed: 0000  -TM Location: lip  -TM Type: other (see comments)  -TM Additional Comments: petechial  -TM    Row Name 05/20/23 1600             Wound 05/19/23 0146 Left lumbar spine    Wound - Properties Group Placement Date: 05/19/23  -AS Placement Time: 0146  -AS Side: Left  -AS Location: lumbar spine  -AS    Retired Wound - Properties Group Placement Date: 05/19/23  -AS Placement Time: 0146  -AS Side: Left  -AS Location: lumbar spine  -AS    Retired Wound - Properties Group Date first assessed: 05/19/23  -AS Time first assessed: 0146  -AS Side: Left  -AS Location: lumbar spine  -AS       Rash 05/18/23 0000 Right anterior foot other (see comments)    Rash - Properties Group Placement Date: 05/18/23  -TM Placement Time: 0000  -TM Side: Right  -TM Orientation: anterior  -TM Location: foot  -TM Type: other (see comments)  -TM Additional Comments: petechial  -TM    Distribution localized  -AL      Color red  -AL      Configuration/Shape round;symmetrical  -AL      Borders distinct  -AL      Characteristics flat  -AL      Lesion Size pinpoint  -AL      Retired Rash - Properties Group Placement Date: 05/18/23  -TM Placement Time: 0000  -TM Side: Right  -TM Orientation: anterior  -TM  Location: foot  -TM Type: other (see comments)  -TM Additional Comments: petechial  -TM    Retired Rash - Properties Group Date first assessed: 05/18/23  -TM Time first assessed: 0000  -TM Side: Right  -TM Orientation: anterior  -TM Location: foot  -TM Type: other (see comments)  -TM Additional Comments: petechial  -TM       Rash 05/18/23 0000 Left anterior foot other (see comments)    Rash - Properties Group Placement Date: 05/18/23  -TM Placement Time: 0000  -TM Side: Left  -TM Orientation: anterior  -TM Location: foot  -TM Type: other (see comments)  -TM Additional Comments: petichial  -TM    Distribution localized  -AL      Color red  -AL      Configuration/Shape round;symmetrical  -AL      Borders distinct  -AL      Characteristics flat  -AL      Lesion Size pinpoint  -AL      Retired Rash - Properties Group Placement Date: 05/18/23  -TM Placement Time: 0000  -TM Side: Left  -TM Orientation: anterior  -TM Location: foot  -TM Type: other (see comments)  -TM Additional Comments: petichial  -TM    Retired Rash - Properties Group Date first assessed: 05/18/23  -TM Time first assessed: 0000  -TM Side: Left  -TM Orientation: anterior  -TM Location: foot  -TM Type: other (see comments)  -TM Additional Comments: petichial  -TM       Rash 05/18/23 0000 lip other (see comments)    Rash - Properties Group Placement Date: 05/18/23  -TM Placement Time: 0000  -TM Location: lip  -TM Type: other (see comments)  -TM Additional Comments: petechial  -TM    Distribution localized  -AL      Color color consistent with skin  -AL      Configuration/Shape asymmetric  -AL      Borders active  -AL      Characteristics raised  -AL      Lesion Size 0.5 to 1 cm  -AL      Care, Rash open to air  -AL      Retired Rash - Properties Group Placement Date: 05/18/23  -TM Placement Time: 0000  -TM Location: lip  -TM Type: other (see comments)  -TM Additional Comments: petechial  -TM    Retired Rash - Properties Group Date first assessed: 05/18/23   -TM Time first assessed: 0000  -TM Location: lip  -TM Type: other (see comments)  -TM Additional Comments: petechial  -TM          User Key  (r) = Recorded By, (t) = Taken By, (c) = Cosigned By    Initials Name Provider Type    Kaylie Vides, MAURAN Licensed Nurse    AS Lizzie Shepherd RN Registered Nurse    Cyn Cross RN Registered Nurse    Esther Delaney RN Registered Nurse                  Assessment & Plan      allopurinol, 500 mg, Oral, Daily  budesonide-formoterol, 2 puff, Inhalation, BID - RT  citalopram, 10 mg, Oral, Daily  empagliflozin, 25 mg, Oral, Daily  furosemide, 40 mg, Oral, Daily  gabapentin, 300 mg, Oral, TID  insulin glargine, 1-200 Units, Subcutaneous, Nightly - Glucommander  insulin lispro, 1-200 Units, Subcutaneous, 4x Daily With Meals & Nightly  levoFLOXacin, 500 mg, Oral, Daily  losartan, 25 mg, Oral, Daily  metoprolol succinate XL, 25 mg, Oral, Daily  mirtazapine, 15 mg, Oral, Nightly  senna-docusate sodium, 2 tablet, Oral, BID  sodium chloride, 10 mL, Intravenous, Q12H       pain,          Active Hospital Problems:  Active Hospital Problems    Diagnosis    • **Pancytopenia    • Bilateral subdural hematomas    • Lumbar radiculopathy    • Traumatic subdural hemorrhage without loss of consciousness    • Chronic pain    • NICM (nonischemic cardiomyopathy)    • Type 2 diabetes mellitus with diabetic polyneuropathy, with long-term current use of insulin    • Depression with anxiety    • History of pulmonary embolism    • Medically noncompliant    • CML (chronic myelocytic leukemia)      Plan:   Pancytopenia  -WBC 2.6, Hgb 8.6, platelets 7  -received 1 unit of platelets in ambulatory care and developed a cough, chills, and nausea. Transfusion was stopped and patient was given IV decadron and bendadryl and sent to the ER  -plts now 8  -Solu-Medrol is given by recommendation of oncology  -start levaquin 500mg daily x7 days for prophylaxis per oncology notes  -neutropenic  precautions  -oncology consulted      Remote subdural hemorrhage  -CT head: bilateral subdural hygromas suggesting remote subdural hemorrhage.    -Neurosurgery was consulted    CML  -per oncology notes no longer on Gleevec or hydroxyurea currently on Ponatinib 15mg followed      DM type 2  -glucose 375 at 11am  -check glucose AC/HS  -cont home lantus, premeal insulin, and SSI     Nonischemic Cardiomyopathy EF 44%  -cont home GDMT: BB, ARB, SGTL2, lasix     Chronic pain  -pain pump in place, managed by pain management outpatient. Pt just had pump adjusted yesterday      Anxiety  -cont home xanax    DVT prophylaxis:  Mechanical DVT prophylaxis orders are present.    CODE STATUS:    Medical Intervention Limits: NO intubation (DNI); NO artificial nutrition  Level Of Support Discussed With: Patient; Next of Kin (If No Surrogate)  Code Status (Patient has no pulse and is not breathing): CPR (Attempt to Resuscitate)  Medical Interventions (Patient has pulse or is breathing): Limited Support  Release to patient: Routine Release      Disposition:  I expect patient to be discharged 3 days.    This patient has been examined wearing appropriate Personal Protective Equipment and discussed with hospital infection control department. 05/21/23      Electronically signed by Wagner Eckert MD, 05/21/23, 12:07 EDT.  [facility] Hospitalist Team

## 2023-05-21 NOTE — NURSING NOTE
Call to dayshift nurse, Kaylie LORD LPN, to clarify order placed for plasma at 1911. April states that the order was entered in error and should be corrected to 2 units of platelets.   This RN in contact with blood bank regarding orders.

## 2023-05-21 NOTE — PLAN OF CARE
Problem: Fall Injury Risk  Goal: Absence of Fall and Fall-Related Injury  Outcome: Ongoing, Progressing  Intervention: Identify and Manage Contributors  Recent Flowsheet Documentation  Taken 5/21/2023 1800 by Kaylie Beth LPN  Medication Review/Management: medications reviewed  Taken 5/21/2023 1600 by Kaylie Beth LPN  Medication Review/Management: medications reviewed  Taken 5/21/2023 1400 by Kaylie Beth LPN  Medication Review/Management: medications reviewed  Taken 5/21/2023 1200 by Kaylie Beth LPN  Medication Review/Management: medications reviewed  Taken 5/21/2023 1000 by Kaylie Beth LPN  Medication Review/Management: medications reviewed  Taken 5/21/2023 0800 by Kaylie Beth LPN  Medication Review/Management: medications reviewed  Intervention: Promote Injury-Free Environment  Recent Flowsheet Documentation  Taken 5/21/2023 1800 by Kaylie Beth LPN  Safety Promotion/Fall Prevention:   assistive device/personal items within reach   clutter free environment maintained   nonskid shoes/slippers when out of bed   safety round/check completed  Taken 5/21/2023 1600 by Kaylie Beth LPN  Safety Promotion/Fall Prevention:   assistive device/personal items within reach   clutter free environment maintained   nonskid shoes/slippers when out of bed   safety round/check completed  Taken 5/21/2023 1400 by Kaylie Beth LPN  Safety Promotion/Fall Prevention:   activity supervised   assistive device/personal items within reach   clutter free environment maintained   fall prevention program maintained   gait belt   nonskid shoes/slippers when out of bed   safety round/check completed  Taken 5/21/2023 1200 by Kaylie Beth LPN  Safety Promotion/Fall Prevention:   assistive device/personal items within reach   clutter free environment maintained   nonskid shoes/slippers when out of bed   safety round/check completed  Taken 5/21/2023 1000 by Kaylie Beth LPN  Safety Promotion/Fall Prevention:    assistive device/personal items within reach   clutter free environment maintained   nonskid shoes/slippers when out of bed   safety round/check completed  Taken 5/21/2023 0800 by Kaylie Beth LPN  Safety Promotion/Fall Prevention:   activity supervised   assistive device/personal items within reach   clutter free environment maintained   fall prevention program maintained   gait belt   nonskid shoes/slippers when out of bed   safety round/check completed     Problem: Skin Injury Risk Increased  Goal: Skin Health and Integrity  Outcome: Ongoing, Progressing  Intervention: Optimize Skin Protection  Recent Flowsheet Documentation  Taken 5/21/2023 1600 by Kaylie Beth LPN  Pressure Reduction Techniques: frequent weight shift encouraged  Pressure Reduction Devices:   positioning supports utilized   pressure-redistributing mattress utilized  Skin Protection: adhesive use limited  Taken 5/21/2023 1200 by Kaylie Beth LPN  Pressure Reduction Techniques:   frequent weight shift encouraged   sit time limited to 2 hours   weight shift assistance provided  Pressure Reduction Devices:   pressure-redistributing mattress utilized   positioning supports utilized  Skin Protection: adhesive use limited  Taken 5/21/2023 0800 by Kaylie Beth LPN  Pressure Reduction Techniques:   frequent weight shift encouraged   sit time limited to 2 hours   weight shift assistance provided  Pressure Reduction Devices:   pressure-redistributing mattress utilized   positioning supports utilized  Skin Protection: adhesive use limited     Problem: Adult Inpatient Plan of Care  Goal: Plan of Care Review  Outcome: Ongoing, Progressing  Goal: Patient-Specific Goal (Individualized)  Outcome: Ongoing, Progressing  Goal: Absence of Hospital-Acquired Illness or Injury  Outcome: Ongoing, Progressing  Intervention: Identify and Manage Fall Risk  Recent Flowsheet Documentation  Taken 5/21/2023 1800 by Kaylie Beth LPN  Safety Promotion/Fall  Prevention:   assistive device/personal items within reach   clutter free environment maintained   nonskid shoes/slippers when out of bed   safety round/check completed  Taken 5/21/2023 1600 by Kaylie Beth LPN  Safety Promotion/Fall Prevention:   assistive device/personal items within reach   clutter free environment maintained   nonskid shoes/slippers when out of bed   safety round/check completed  Taken 5/21/2023 1400 by Kaylie Beth LPN  Safety Promotion/Fall Prevention:   activity supervised   assistive device/personal items within reach   clutter free environment maintained   fall prevention program maintained   gait belt   nonskid shoes/slippers when out of bed   safety round/check completed  Taken 5/21/2023 1200 by Kaylie Beth LPN  Safety Promotion/Fall Prevention:   assistive device/personal items within reach   clutter free environment maintained   nonskid shoes/slippers when out of bed   safety round/check completed  Taken 5/21/2023 1000 by Kaylie Beth LPN  Safety Promotion/Fall Prevention:   assistive device/personal items within reach   clutter free environment maintained   nonskid shoes/slippers when out of bed   safety round/check completed  Taken 5/21/2023 0800 by Kaylie Beth LPN  Safety Promotion/Fall Prevention:   activity supervised   assistive device/personal items within reach   clutter free environment maintained   fall prevention program maintained   gait belt   nonskid shoes/slippers when out of bed   safety round/check completed  Intervention: Prevent Skin Injury  Recent Flowsheet Documentation  Taken 5/21/2023 1600 by Kaylie Beth LPN  Skin Protection: adhesive use limited  Taken 5/21/2023 1200 by Kaylie Beth LPN  Skin Protection: adhesive use limited  Taken 5/21/2023 0800 by Kaylie Beth LPN  Skin Protection: adhesive use limited  Intervention: Prevent and Manage VTE (Venous Thromboembolism) Risk  Recent Flowsheet Documentation  Taken 5/21/2023 1600 by Kirit  Kaylie DARBY LPN  VTE Prevention/Management: (see MAR) other (see comments)  Taken 5/21/2023 1200 by Kaylie Beth LPN  VTE Prevention/Management: (contraindicated) other (see comments)  Taken 5/21/2023 0800 by Kaylie Beth LPN  VTE Prevention/Management: (contraindicated) other (see comments)  Intervention: Prevent Infection  Recent Flowsheet Documentation  Taken 5/21/2023 1800 by Kaylie Beth LPN  Infection Prevention:   hand hygiene promoted   single patient room provided  Taken 5/21/2023 1600 by Kaylie Beth LPN  Infection Prevention:   hand hygiene promoted   single patient room provided  Taken 5/21/2023 1400 by Kaylie Beth LPN  Infection Prevention:   hand hygiene promoted   single patient room provided  Taken 5/21/2023 1200 by Kaylie Beth LPN  Infection Prevention:   hand hygiene promoted   single patient room provided  Taken 5/21/2023 1000 by Kaylie Beth LPN  Infection Prevention:   hand hygiene promoted   single patient room provided  Taken 5/21/2023 0800 by Kaylie Beth LPN  Infection Prevention:   hand hygiene promoted   single patient room provided  Goal: Optimal Comfort and Wellbeing  Outcome: Ongoing, Progressing  Intervention: Provide Person-Centered Care  Recent Flowsheet Documentation  Taken 5/21/2023 1600 by Kaylie Beth LPN  Trust Relationship/Rapport:   care explained   thoughts/feelings acknowledged   reassurance provided   questions encouraged   questions answered  Taken 5/21/2023 1200 by Kaylie Beth LPN  Trust Relationship/Rapport:   care explained   thoughts/feelings acknowledged   questions encouraged   reassurance provided   questions answered  Taken 5/21/2023 0800 by Kaylie Beth LPN  Trust Relationship/Rapport:   care explained   thoughts/feelings acknowledged   reassurance provided   questions encouraged   questions answered  Goal: Readiness for Transition of Care  Outcome: Ongoing, Progressing   Goal Outcome Evaluation:

## 2023-05-21 NOTE — CONSULTS
visited with patient and spouse at bedside.    Patient extensively reviewed about her sickness and the affects it has on her and her family. Patient often spoke of herself as wronging others due to her illness. Patient believes she is a burden to her family and cannot participate in activities as she is accustomed to do. Discussed with patient about expectations she places on self. Encouraged patient to view self with an illness that affects the way they live and to not fault self. Discussed several analogies to reinforce how her expectations on self could be causing her stress. Patient also reports difficulty talking with family about her illness and topics of end of life. Acknowledged to patient this subjects can be difficult for her and others, but they are important to talk about. Patient plans to talk to her family about the possibility that end-of-life for her could be sooner than later.      Patient reports that she attends a local [employer]. Discussed various topics of theology: Her view of God, Terry, after life, salvation. In discussion patient ask of  his views. Retrieved a Bible for the patient to read for spiritual guidance. Explored how family dynamics influenced spiritual development/beliefs. Prayed over topics discussed.    Will continue to follow.     Max Cobb

## 2023-05-21 NOTE — NURSING NOTE
Per blood bank, 2 unit platelets not approved for order wo medical director approval due to 1) awaiting HLA matching results and 2) prior platelet transfusions have been ineffective.   This RN reported to Dr Truong, no new orders at this time. Proceed with PRBC transfusion.

## 2023-05-21 NOTE — NURSING NOTE
Call placed to [facility] for Palliative Care c/s. Message left requesting consult.

## 2023-05-21 NOTE — PLAN OF CARE
Goal Outcome Evaluation:   Patient received 1 unit PRBC overnight- pt tolerated well. Labs pending draw this AM for    Post evaluation. Family remained at bedside for duration and were also updated on POC. Bleeding risks discussed and observed for signs r/t thrombocytopenia.   Will continue to monitor.

## 2023-05-22 LAB
ALBUMIN SERPL-MCNC: 3.7 G/DL (ref 3.5–5.2)
ALBUMIN/GLOB SERPL: 1.1 G/DL
ALP SERPL-CCNC: 280 U/L (ref 39–117)
ALT SERPL W P-5'-P-CCNC: 38 U/L (ref 1–33)
ANION GAP SERPL CALCULATED.3IONS-SCNC: 12 MMOL/L (ref 5–15)
AST SERPL-CCNC: 28 U/L (ref 1–32)
BH BB BLOOD EXPIRATION DATE: NORMAL
BH BB BLOOD EXPIRATION DATE: NORMAL
BH BB BLOOD TYPE BARCODE: 6200
BH BB BLOOD TYPE BARCODE: 6200
BH BB DISPENSE STATUS: NORMAL
BH BB DISPENSE STATUS: NORMAL
BH BB PRODUCT CODE: NORMAL
BH BB PRODUCT CODE: NORMAL
BH BB UNIT NUMBER: NORMAL
BH BB UNIT NUMBER: NORMAL
BILIRUB SERPL-MCNC: 1.1 MG/DL (ref 0–1.2)
BUN SERPL-MCNC: 25 MG/DL (ref 6–20)
BUN/CREAT SERPL: 34.7 (ref 7–25)
CALCIUM SPEC-SCNC: 9.1 MG/DL (ref 8.6–10.5)
CHLORIDE SERPL-SCNC: 103 MMOL/L (ref 98–107)
CO2 SERPL-SCNC: 29 MMOL/L (ref 22–29)
CREAT SERPL-MCNC: 0.72 MG/DL (ref 0.57–1)
CROSSMATCH INTERPRETATION: NORMAL
CROSSMATCH INTERPRETATION: NORMAL
DEPRECATED RDW RBC AUTO: 52.9 FL (ref 37–54)
EGFRCR SERPLBLD CKD-EPI 2021: 103.9 ML/MIN/1.73
ERYTHROCYTE [DISTWIDTH] IN BLOOD BY AUTOMATED COUNT: 17.6 % (ref 12.3–15.4)
GLOBULIN UR ELPH-MCNC: 3.3 GM/DL
GLUCOSE BLDC GLUCOMTR-MCNC: 171 MG/DL (ref 70–105)
GLUCOSE BLDC GLUCOMTR-MCNC: 189 MG/DL (ref 70–105)
GLUCOSE BLDC GLUCOMTR-MCNC: 201 MG/DL (ref 70–105)
GLUCOSE BLDC GLUCOMTR-MCNC: 281 MG/DL (ref 70–105)
GLUCOSE SERPL-MCNC: 224 MG/DL (ref 65–99)
HCT VFR BLD AUTO: 26.5 % (ref 34–46.6)
HGB BLD-MCNC: 9 G/DL (ref 12–15.9)
LYMPHOCYTES # BLD MANUAL: 1.75 10*3/MM3 (ref 0.7–3.1)
LYMPHOCYTES NFR BLD MANUAL: 1 % (ref 5–12)
MCH RBC QN AUTO: 27.8 PG (ref 26.6–33)
MCHC RBC AUTO-ENTMCNC: 34 G/DL (ref 31.5–35.7)
MCV RBC AUTO: 81.8 FL (ref 79–97)
MONOCYTES # BLD: 0.02 10*3/MM3 (ref 0.1–0.9)
NEUTROPHILS # BLD AUTO: 0.04 10*3/MM3 (ref 1.7–7)
NEUTROPHILS NFR BLD MANUAL: 1 % (ref 42.7–76)
NEUTS BAND NFR BLD MANUAL: 1 % (ref 0–5)
PLATELET # BLD AUTO: 4 10*3/MM3 (ref 140–450)
PMV BLD AUTO: 9.9 FL (ref 6–12)
POTASSIUM SERPL-SCNC: 3.6 MMOL/L (ref 3.5–5.2)
PROT SERPL-MCNC: 7 G/DL (ref 6–8.5)
RBC # BLD AUTO: 3.24 10*6/MM3 (ref 3.77–5.28)
RBC MORPH BLD: NORMAL
SCAN SLIDE: NORMAL
SMALL PLATELETS BLD QL SMEAR: ABNORMAL
SODIUM SERPL-SCNC: 144 MMOL/L (ref 136–145)
UNIT  ABO: NORMAL
UNIT  ABO: NORMAL
UNIT  RH: NORMAL
UNIT  RH: NORMAL
VARIANT LYMPHS NFR BLD MANUAL: 97 % (ref 19.6–45.3)
WBC MORPH BLD: NORMAL
WBC NRBC COR # BLD: 1.8 10*3/MM3 (ref 3.4–10.8)

## 2023-05-22 PROCEDURE — 80053 COMPREHEN METABOLIC PANEL: CPT | Performed by: INTERNAL MEDICINE

## 2023-05-22 PROCEDURE — 86902 BLOOD TYPE ANTIGEN DONOR EA: CPT

## 2023-05-22 PROCEDURE — 82948 REAGENT STRIP/BLOOD GLUCOSE: CPT

## 2023-05-22 PROCEDURE — 85007 BL SMEAR W/DIFF WBC COUNT: CPT | Performed by: INTERNAL MEDICINE

## 2023-05-22 PROCEDURE — 85025 COMPLETE CBC W/AUTO DIFF WBC: CPT | Performed by: INTERNAL MEDICINE

## 2023-05-22 PROCEDURE — 99222 1ST HOSP IP/OBS MODERATE 55: CPT | Performed by: NURSE PRACTITIONER

## 2023-05-22 PROCEDURE — 63710000001 INSULIN LISPRO (HUMAN) PER 5 UNITS

## 2023-05-22 PROCEDURE — 63710000001 INSULIN GLARGINE PER 5 UNITS

## 2023-05-22 PROCEDURE — 99232 SBSQ HOSP IP/OBS MODERATE 35: CPT | Performed by: INTERNAL MEDICINE

## 2023-05-22 PROCEDURE — 85027 COMPLETE CBC AUTOMATED: CPT | Performed by: INTERNAL MEDICINE

## 2023-05-22 RX ORDER — OXYCODONE HYDROCHLORIDE 5 MG/1
5 TABLET ORAL EVERY 4 HOURS PRN
Status: DISCONTINUED | OUTPATIENT
Start: 2023-05-22 | End: 2023-05-24

## 2023-05-22 RX ORDER — POTASSIUM CHLORIDE 20 MEQ/1
40 TABLET, EXTENDED RELEASE ORAL EVERY 4 HOURS
Status: COMPLETED | OUTPATIENT
Start: 2023-05-22 | End: 2023-05-22

## 2023-05-22 RX ADMIN — ALLOPURINOL 500 MG: 300 TABLET ORAL at 09:05

## 2023-05-22 RX ADMIN — INSULIN LISPRO 11 UNITS: 100 INJECTION, SOLUTION INTRAVENOUS; SUBCUTANEOUS at 13:34

## 2023-05-22 RX ADMIN — EMPAGLIFLOZIN 25 MG: 25 TABLET, FILM COATED ORAL at 09:06

## 2023-05-22 RX ADMIN — SODIUM CHLORIDE 500 ML: 9 INJECTION, SOLUTION INTRAVENOUS at 15:49

## 2023-05-22 RX ADMIN — GABAPENTIN 300 MG: 300 CAPSULE ORAL at 09:06

## 2023-05-22 RX ADMIN — OXYCODONE 5 MG: 5 TABLET ORAL at 13:34

## 2023-05-22 RX ADMIN — POTASSIUM CHLORIDE 40 MEQ: 1500 TABLET, EXTENDED RELEASE ORAL at 10:47

## 2023-05-22 RX ADMIN — INSULIN LISPRO 8 UNITS: 100 INJECTION, SOLUTION INTRAVENOUS; SUBCUTANEOUS at 17:56

## 2023-05-22 RX ADMIN — LEVOFLOXACIN 500 MG: 500 TABLET, FILM COATED ORAL at 09:04

## 2023-05-22 RX ADMIN — INSULIN LISPRO 9 UNITS: 100 INJECTION, SOLUTION INTRAVENOUS; SUBCUTANEOUS at 10:47

## 2023-05-22 RX ADMIN — GABAPENTIN 300 MG: 300 CAPSULE ORAL at 15:07

## 2023-05-22 RX ADMIN — INSULIN GLARGINE 23 UNITS: 100 INJECTION, SOLUTION SUBCUTANEOUS at 21:29

## 2023-05-22 RX ADMIN — METOPROLOL SUCCINATE 25 MG: 25 TABLET, EXTENDED RELEASE ORAL at 09:06

## 2023-05-22 RX ADMIN — ALPRAZOLAM 0.5 MG: 0.5 TABLET ORAL at 21:35

## 2023-05-22 RX ADMIN — Medication 10 ML: at 21:35

## 2023-05-22 RX ADMIN — CITALOPRAM HYDROBROMIDE 10 MG: 20 TABLET ORAL at 09:10

## 2023-05-22 RX ADMIN — HYDROXYZINE HYDROCHLORIDE 25 MG: 25 TABLET, FILM COATED ORAL at 09:16

## 2023-05-22 RX ADMIN — ACETAMINOPHEN 650 MG: 325 TABLET, FILM COATED ORAL at 09:10

## 2023-05-22 RX ADMIN — POTASSIUM CHLORIDE 40 MEQ: 1500 TABLET, EXTENDED RELEASE ORAL at 13:38

## 2023-05-22 RX ADMIN — FUROSEMIDE 40 MG: 40 TABLET ORAL at 09:06

## 2023-05-22 RX ADMIN — MIRTAZAPINE 15 MG: 15 TABLET, FILM COATED ORAL at 21:29

## 2023-05-22 RX ADMIN — GABAPENTIN 300 MG: 300 CAPSULE ORAL at 21:29

## 2023-05-22 RX ADMIN — OXYCODONE 5 MG: 5 TABLET ORAL at 21:29

## 2023-05-22 RX ADMIN — LOSARTAN POTASSIUM 25 MG: 25 TABLET, FILM COATED ORAL at 09:06

## 2023-05-22 RX ADMIN — Medication 10 ML: at 09:04

## 2023-05-22 NOTE — CONSULTS
Palliative Care Consultation    Patient Name: Nieves Mc  : 1975  MRN: [id number]  Allergies: Patient has no known allergies.    Requesting clinician:  Tomeka  Reason for consult: Consultation for clarification of goals of care and code status.      Patient Code Status:   Code Status and Medical Interventions:   Ordered at: 23 2146     Medical Intervention Limits:    NO intubation (DNI)    NO artificial nutrition     Level Of Support Discussed With:    Patient    Next of Kin (If No Surrogate)     Code Status (Patient has no pulse and is not breathing):    CPR (Attempt to Resuscitate)     Medical Interventions (Patient has pulse or is breathing):    Limited Support     Release to patient:    Routine Release           Chief Complaint:    Cough, chills    History of Present Illness    Nieevs Mc is a 47 y.o. female [facility] after a suspected platelet reaction. Patient receiving platelets and reported a cough with chills and headache. She was on her 2nd unit of platelets when symptoms started. She also reported that she fell on  when her leg gave up but had no head trauma. She denied nausea, vomiting, shortness of breath, or diarrhea.     Of note: [facility]     In ED: WBC 2.6, Hgb 8.6, Platelets 7, Glucose 375, Alk phos 230    UA with trace ketones, glucose, and protein    CT Head Without Contrast   Result Date: 2023  Bilateral subdural hygromas suggesting remote subdural hemorrhage. There is local mass effect with sulcal effacement and decreased of the size of the lateral ventricles but no evidence of herniation at this time No acute intracranial hemorrhage appreciated. Recommend follow-up,     Patient given benadryl and steroids. Neurosurgery consulted for CT scan. Hem onc consulted for known cancer history. Started on transfusion protocols.     Neurosurgery recommending outpatient CT in 2  weeks.      Palliative consult for goals of care discussion in an acutely ill patient with CML.       VITAL SIGNS:   Temp:  [97.7 °F (36.5 °C)-98.4 °F (36.9 °C)] 98.2 °F (36.8 °C)  Heart Rate:  [] 88  Resp:  [16-24] 17  BP: ()/(54-96) 92/54       PMH: PE, DM    Past Surgical History:   Procedure Laterality Date   • BONE MARROW BIOPSY     • BREAST SURGERY     • BRONCHOSCOPY N/A 2022    Procedure: BRONCHOSCOPY bilateral lung washing;  Surgeon: Charlene Camara MD;  Location: [facility];  Service: Pulmonary;  Laterality: N/A;  post: rule out infection vs transfusion lung injury   •  SECTION     • CHOLECYSTECTOMY     • EYE SURGERY      laser surgery due  to hemmorage--- 2021-- another surgery  lt eye11/15/21   • RETINAL DETACHMENT SURGERY     • SPINE SURGERY      [place] spinal block   • TUBAL ABDOMINAL LIGATION         Family History   Problem Relation Age of Onset   • Diabetes Mother    • Diabetes Maternal Grandmother    • Heart attack Maternal Grandmother    • Stroke Maternal Grandmother        Social History     Tobacco Use   • Smoking status: Never   • Smokeless tobacco: Never   Vaping Use   • Vaping Use: Never used   Substance Use Topics   • Alcohol use: No   • Drug use: No           LABS:    Results from last 7 days   Lab Units 23  0721   WBC 10*3/mm3 0.50*   HEMOGLOBIN g/dL 7.9*   HEMATOCRIT % 23.5*   PLATELETS 10*3/mm3 4*     Results from last 7 days   Lab Units 23  0721   SODIUM mmol/L 143   POTASSIUM mmol/L 4.3   CHLORIDE mmol/L 104   CO2 mmol/L 25.0   BUN mg/dL 23*   CREATININE mg/dL 0.66   GLUCOSE mg/dL 525*   CALCIUM mg/dL 9.1     Results from last 7 days   Lab Units 23  0721   SODIUM mmol/L 143   POTASSIUM mmol/L 4.3   CHLORIDE mmol/L 104   CO2 mmol/L 25.0   BUN mg/dL 23*   CREATININE mg/dL 0.66   CALCIUM mg/dL 9.1   BILIRUBIN mg/dL 2.0*   ALK PHOS U/L 282*   ALT (SGPT) U/L 36*   AST (SGOT) U/L 26   GLUCOSE mg/dL 525*         IMAGING STUDIES:  No  radiology results for the last day      I reviewed the patient's new clinical results including labs, imaging, and vitals.        Scheduled Meds:  allopurinol, 500 mg, Oral, Daily  budesonide-formoterol, 2 puff, Inhalation, BID - RT  citalopram, 10 mg, Oral, Daily  empagliflozin, 25 mg, Oral, Daily  furosemide, 40 mg, Oral, Daily  gabapentin, 300 mg, Oral, TID  insulin glargine, 1-200 Units, Subcutaneous, Nightly - Glucommander  insulin lispro, 1-200 Units, Subcutaneous, 4x Daily With Meals & Nightly  levoFLOXacin, 500 mg, Oral, Daily  losartan, 25 mg, Oral, Daily  metoprolol succinate XL, 25 mg, Oral, Daily  mirtazapine, 15 mg, Oral, Nightly  senna-docusate sodium, 2 tablet, Oral, BID  sodium chloride, 10 mL, Intravenous, Q12H      Continuous Infusions:  pain,         I have reviewed patient's current medication list.     Review of Systems   Constitutional: Positive for appetite change and fatigue.   Musculoskeletal: Positive for arthralgias and back pain.   Neurological: Positive for weakness.   All other systems reviewed and are negative.        Physical Exam  Vitals and nursing note reviewed.   Constitutional:       Appearance: She is ill-appearing.   HENT:      Head: Normocephalic and atraumatic.      Nose: Nose normal.      Mouth/Throat:      Mouth: Mucous membranes are dry.      Pharynx: Oropharynx is clear.   Eyes:      Extraocular Movements: Extraocular movements intact.      Conjunctiva/sclera: Conjunctivae normal.      Pupils: Pupils are equal, round, and reactive to light.   Cardiovascular:      Rate and Rhythm: Normal rate.      Pulses: Normal pulses.   Pulmonary:      Effort: Pulmonary effort is normal.   Abdominal:      General: Abdomen is flat.   Musculoskeletal:         General: Normal range of motion.      Cervical back: Normal range of motion.   Skin:     General: Skin is warm and dry.      Coloration: Skin is pale.      Findings: Bruising present.   Neurological:      General: No focal deficit  "present.      Mental Status: She is alert and oriented to person, place, and time. Mental status is at baseline.             PROBLEM LIST:    Pancytopenia    CML (chronic myelocytic leukemia)    Depression with anxiety    History of pulmonary embolism    Medically noncompliant    Type 2 diabetes mellitus with diabetic polyneuropathy, with long-term current use of insulin    NICM (nonischemic cardiomyopathy)    Chronic pain    Traumatic subdural hemorrhage without loss of consciousness    Bilateral subdural hematomas    Lumbar radiculopathy          ASSESSMENT/PLAN:    Pancytopenia: with WBC 2.6, Platelets 7, and Hgb 8.6. On transfusion protocols. Hem onc consulted.     Subdural hematoma: remote, noted on CT with no neuro symptoms. Neurosurgery consulted. Repeat CT ordered.     CML: Hem onc following. On current treatment with Ponatinib. Hem onc following.     DM/CHF/Chronic pain/Anxiety: chronic conditions per primary.       These illnesses and their management contribute to the need for a palliative consult and advanced care planning.      ADVANCED CARE PLANNIN/22 We are familiar with this patient, and have met with her on several prior admission. We completed a living will and healthcare representative form on previous visit. Patient goals of care documenting not wanting artificial nutrition of long term life support. Patients significant other Jose is her health care representative.  Met with patient this am to discuss her current clinical status and overall goals of care. Spouse at bedside this am. Patient is alert, tearful, and moaning in pain. We discussed her current clinical status. She shares that over the weekend, she had a conversation with Dr. Truong that she interpreted as \"she was dying.\" She states that she called all of her family members and said her goodbyes and that if she is dying, she wants to be able to get home. She tells me that she does not understand why her primary MD Dr. Lerma " "did not share her prognosis with her and is asking to wait before making any big decisions until she is able to talk with Dr. Lerma. We discussed hospice services and how they would benefit the patient in the event she decided she only wanted to focus on comfort. She repeatedly tells me that she \"does not want to die.\" This information was shared with nursing. I spoke with Dr. Yeung NP as well as Dr. Mukherjee to get a better understanding of patients prognosis. Plan is for MD Lerma to see patient on Wednesday. We will continue to follow.       Advanced Directives: Patient has advance directive, copy in chart  Health Care Directive on file:    Health Care Surrogate:      Palliative Performance Scale Score:    Comments:           Decisional Capacity: yes  Patient's understanding of illness: adequate  Patient goals of care:  Full code, full intervention      Thank you for this consult and allowing us to participate in patient's plan of care. Palliative Care Team will continue to follow patient.       I spent 58 minutes reviewing providers documentation, medication records, assessing and examining patient, discussing with family, answering questions, providing guidance about a plan of care, and coordinating care with other healthcare members. More than 50% of time spent face to face discussing disease education, current clinical status, and medication management.     I spent an additional 21 minutes on advanced care planning, goals of care, and code status discussion.  I obtained consent for ACP discussion.     Staci Collier, APRN  5/22/2023  "

## 2023-05-22 NOTE — SIGNIFICANT NOTE
05/22/23 1057   OTHER   Discipline physical therapist   Rehab Time/Intention   Session Not Performed unable to treat, medical status change  (spoke with RN, pt with plts at 4, hold therapy today and await further input from medical team re: plan/goals of care.)   Recommendation   PT - Next Appointment 05/23/23

## 2023-05-22 NOTE — CASE MANAGEMENT/SOCIAL WORK
Continued Stay Note   [facility]     Patient Name: Nieves Mc  MRN: [id number]  Today's Date: 5/22/2023    Admit Date: 5/17/2023    Plan: Anticipate routine home. [facility] referral for 3-in-1 BSC and rollator (orders pending from MD).   Discharge Plan     Row Name 05/22/23 1542       Plan    Plan Anticipate routine home. [facility] referral for 3-in-1 BSC and rollator (orders pending from MD).    Plan Comments [facility] and she would have to decline. PT scheduled to work with patient on 5/23. DC Barriers: Palliative care consult pending, lab monitoring, WBC 1.8, platelets 4, IV fluid bolus.              Jaida Charles RN     Office Phone: [phone]  Office Cell: [phone]

## 2023-05-22 NOTE — PROGRESS NOTES
Hematology/Oncology Inpatient Progress Note    PATIENT NAME: Nieves Mc  : 1975  MRN: [id number]    CHIEF COMPLAINT:     HISTORY OF PRESENT ILLNESS:      Nieves Mc is a 47 y.o. female who presented to [facility] on 2023 with complaints of nausea, vomiting, coughing and chills during a platelet transfusion.  Past medical history of CML on ponatinib, cardiomyopathy, chronic pain, uncontrolled type 1 diabetes, chronic anemia, insomnia, anxiety and depression, pulmonary embolism on chronic anticoagulation with Xarelto, medical noncompliance.       Evaluation in the ED: Patient reported a fall 2 to 3 weeks ago during which she hit the right side of her head a CT of the head was done showing bilateral subdural hygromas suggesting remote subdural hemorrhage; local mass effect with sulcal and effacement and decreased of the size of lateral ventricles but no evidence of herniation; no acute intracranial hemorrhage.  The patient was admitted for further treatment of her pancytopenia and remote subdural hemorrhage with plans for pretreatment with IV Solu-Medrol, Tylenol and Benadryl prior to transfusing another unit of platelets.        23  Hematology/Oncology was consulted on a patient known to us and followed in the office by Dr. Lerma for her diagnosis of CML.  Patient initially presented in  when she was seen in consultation while hospitalized.  At that time the patient was on imatinib.  In  she had progressive thrombocytosis and was transition to nilotinib.  After that she was lost to follow-up until she was once again seen during a hospitalization in .  She was continued on nilotinib and hydroxyurea.  In 2022 she had once again been lost to follow-up for 3 months when she presented in the office after not taking nilotinib during that time.  She had shortness of breath and cough for which a 2D echocardiogram was done and showed a reduced EF of 41 to 45%.  Due to  cardiomyopathy she was transitioned to imatinib and hydroxyurea.  In November 2022 bone marrow biopsy showed her to be 18 accelerated phase CML and that the imatinib was not controlling her malignancy well and she was initiated on ponatinib 45 mg p.o. daily.  She had significant body pain with this dose and for that reason was reduced to 30 mg p.o. daily.  She was once again lost to follow-up for approximately 6 weeks during that time she self reduced to ponatinib 15 mg daily.  She was seen in the office on 5/17/2023 at which time her platelet count was noted to be 8000 and she was severely neutropenic.  Her ponatinib was held and she was sent to the ambulatory care unit for 2 units of platelets to be transfused and a platelet level to be checked posttransfusion for further planning.     Patient has a history of CML on ponatinib.  Patient has been noncompliant with lab evaluations as recommended.  She has been taking 50 mg of ponatinib.  She comes to the office with significant pancytopenia, severe neutropenia, platelets of 7000 and anemia.  Patient was sent to the ambulatory care for platelet transfusions, she developed chills nausea but did receive platelets.  Repeat platelet check was no significantly changed at 8000 for that reason patient was sent to the ER to be admitted to the hospital.  She has a history of falls approximately 2 weeks earlier.  While in the ER she had CT of the head which showed old subdural hematoma.  Patient denies nausea vomiting fevers or chills.  She has not been able to come to the office due to social issues, transportation issues     He/She  has a past medical history of Bone pain, COVID-19 virus infection (01/12/2022), Diabetes mellitus, Extremity pain, Leg pain, Migraine, Pulmonary embolism, and Vision loss.     PCP: Alida Latham APRN    Subjective    5/22/2023: Not worse than before.  No bleeding.  Was able to sleep.  Ate some last evening.    ROS:    Review of Systems    Constitutional: Positive for activity change, appetite change, fatigue and unexpected weight change. Negative for chills, diaphoresis and fever.   HENT: Negative for congestion, dental problem, drooling, ear discharge, ear pain, facial swelling, hearing loss, mouth sores, nosebleeds, postnasal drip, rhinorrhea, sinus pressure, sinus pain, sneezing, sore throat, tinnitus, trouble swallowing and voice change.    Eyes: Negative for photophobia, pain, discharge, redness, itching and visual disturbance.   Respiratory: Negative for apnea, cough, choking, chest tightness, shortness of breath, wheezing and stridor.    Cardiovascular: Negative for chest pain, palpitations and leg swelling.   Gastrointestinal: Negative for abdominal distention, abdominal pain, anal bleeding, blood in stool, constipation, diarrhea, nausea, rectal pain and vomiting.   Endocrine: Negative for cold intolerance, heat intolerance, polydipsia and polyuria.   Genitourinary: Negative for decreased urine volume, difficulty urinating, dysuria, flank pain, frequency, genital sores, hematuria and urgency.   Musculoskeletal: Negative for arthralgias, back pain, gait problem, joint swelling, myalgias, neck pain and neck stiffness.   Skin: Negative for color change, pallor and rash.   Neurological: Positive for weakness. Negative for dizziness, tremors, seizures, syncope, facial asymmetry, speech difficulty, light-headedness, numbness and headaches.   Hematological: Negative for adenopathy. Does not bruise/bleed easily.   Psychiatric/Behavioral: Negative for agitation, behavioral problems, confusion, decreased concentration, hallucinations, self-injury, sleep disturbance and suicidal ideas. The patient is not nervous/anxious.       MEDICATIONS:    Scheduled Meds:  allopurinol, 500 mg, Oral, Daily  budesonide-formoterol, 2 puff, Inhalation, BID - RT  citalopram, 10 mg, Oral, Daily  empagliflozin, 25 mg, Oral, Daily  furosemide, 40 mg, Oral,  "Daily  gabapentin, 300 mg, Oral, TID  insulin glargine, 1-200 Units, Subcutaneous, Nightly - Glucommander  insulin lispro, 1-200 Units, Subcutaneous, 4x Daily With Meals & Nightly  levoFLOXacin, 500 mg, Oral, Daily  losartan, 25 mg, Oral, Daily  metoprolol succinate XL, 25 mg, Oral, Daily  mirtazapine, 15 mg, Oral, Nightly  senna-docusate sodium, 2 tablet, Oral, BID  sodium chloride, 10 mL, Intravenous, Q12H       Continuous Infusions:  pain,        PRN Meds:  •  acetaminophen **OR** acetaminophen **OR** acetaminophen  •  ALPRAZolam  •  senna-docusate sodium **AND** polyethylene glycol **AND** bisacodyl **AND** bisacodyl  •  Calcium Replacement - Follow Nurse / BPA Driven Protocol  •  dextrose  •  dextrose  •  glucagon (human recombinant)  •  hydrocortisone  •  hydrOXYzine  •  insulin lispro  •  Magnesium Standard Dose Replacement - Follow Nurse / BPA Driven Protocol  •  melatonin  •  ondansetron **OR** ondansetron  •  oxyCODONE  •  Phosphorus Replacement - Follow Nurse / BPA Driven Protocol  •  Potassium Replacement - Follow Nurse / BPA Driven Protocol  •  prochlorperazine  •  sodium chloride  •  sodium chloride     ALLERGIES:  No Known Allergies    Objective    VITALS:   /77 (BP Location: Right arm, Patient Position: Lying)   Pulse 103   Temp 98.6 °F (37 °C) (Oral)   Resp 15   Ht 162.6 cm (64\")   Wt 55.4 kg (122 lb 2.2 oz)   LMP 05/25/2022 (Approximate)   SpO2 95%   BMI 20.96 kg/m²     PHYSICAL EXAM: (performed by MD)  Physical Exam  Constitutional:       General: She is not in acute distress.     Appearance: Normal appearance. She is ill-appearing. She is not toxic-appearing or diaphoretic.   HENT:      Head: Normocephalic and atraumatic.      Right Ear: External ear normal.      Left Ear: External ear normal.      Nose: Nose normal.      Mouth/Throat:      Mouth: Mucous membranes are moist.      Pharynx: Oropharynx is clear. No oropharyngeal exudate or posterior oropharyngeal erythema.   Eyes:      " General: No scleral icterus.        Right eye: No discharge.         Left eye: No discharge.      Conjunctiva/sclera: Conjunctivae normal.      Pupils: Pupils are equal, round, and reactive to light.   Cardiovascular:      Rate and Rhythm: Normal rate and regular rhythm.      Pulses: Normal pulses.      Heart sounds: No murmur heard.    No friction rub. No gallop.   Pulmonary:      Effort: Pulmonary effort is normal. No respiratory distress.      Breath sounds: No stridor. No wheezing, rhonchi or rales.   Abdominal:      General: Abdomen is flat. Bowel sounds are normal. There is no distension.      Palpations: Abdomen is soft. There is no mass.      Tenderness: There is no abdominal tenderness. There is no right CVA tenderness, left CVA tenderness, guarding or rebound.      Hernia: No hernia is present.   Musculoskeletal:         General: No swelling, tenderness, deformity or signs of injury.      Cervical back: No rigidity.      Right lower leg: No edema.      Left lower leg: No edema.   Lymphadenopathy:      Cervical: No cervical adenopathy.   Skin:     Coloration: Skin is pale. Skin is not jaundiced.      Findings: No bruising, lesion or rash.   Neurological:      General: No focal deficit present.      Mental Status: She is alert and oriented to person, place, and time. Mental status is at baseline.      Cranial Nerves: No cranial nerve deficit.      Motor: No weakness.      Gait: Gait normal.   Psychiatric:         Mood and Affect: Mood normal.         Behavior: Behavior normal.         Thought Content: Thought content normal.         Judgment: Judgment normal.     I Yifan Truong MD performed the physical exam on 5/22/2023 as documented above.    RECENT LABS:  Lab Results (last 24 hours)     Procedure Component Value Units Date/Time    POC Glucose Once [[id number]]  (Abnormal) Collected: 05/22/23 1200    Specimen: Blood Updated: 05/22/23 1201     Glucose 201 mg/dL      Comment: Serial Number:  [id number]:  [id number]       Manual Differential [[id number]]  (Abnormal) Collected: 05/22/23 0857    Specimen: Blood Updated: 05/22/23 0955     Neutrophil % 1.0 %      Lymphocyte % 97.0 %      Monocyte % 1.0 %      Bands %  1.0 %      Neutrophils Absolute 0.04 10*3/mm3      Lymphocytes Absolute 1.75 10*3/mm3      Monocytes Absolute 0.02 10*3/mm3      RBC Morphology Normal     WBC Morphology Normal     Platelet Estimate Decreased    CBC & Differential [[id number]]  (Abnormal) Collected: 05/22/23 0857    Specimen: Blood Updated: 05/22/23 0955    Narrative:      The following orders were created for panel order CBC & Differential.  Procedure                               Abnormality         Status                     ---------                               -----------         ------                     CBC Auto Differential[[id number]]        Abnormal            Final result               Scan Slide[[id number]]                                       Final result                 Please view results for these tests on the individual orders.    Scan Slide [[id number]] Collected: 05/22/23 0857    Specimen: Blood Updated: 05/22/23 0955     Scan Slide --     Comment: See Manual Differential Results       CBC Auto Differential [[id number]]  (Abnormal) Collected: 05/22/23 0857    Specimen: Blood Updated: 05/22/23 0955     WBC 1.80 10*3/mm3      RBC 3.24 10*6/mm3      Hemoglobin 9.0 g/dL      Hematocrit 26.5 %      MCV 81.8 fL      MCH 27.8 pg      MCHC 34.0 g/dL      RDW 17.6 %      RDW-SD 52.9 fl      MPV 9.9 fL      Platelets 4 10*3/mm3     Narrative:      The previously reported component NRBC is no longer being reported. Previous result was 1.0 /100 WBC (Reference Range: 0.0-0.2 /100 WBC) on 5/22/2023 at 0912 EDT.    Comprehensive Metabolic Panel [[id number]]  (Abnormal) Collected: 05/22/23 0857    Specimen: Blood Updated: 05/22/23 0935     Glucose 224 mg/dL      BUN 25 mg/dL      Creatinine 0.72 mg/dL      Sodium 144  mmol/L      Potassium 3.6 mmol/L      Chloride 103 mmol/L      CO2 29.0 mmol/L      Calcium 9.1 mg/dL      Total Protein 7.0 g/dL      Albumin 3.7 g/dL      ALT (SGPT) 38 U/L      AST (SGOT) 28 U/L      Alkaline Phosphatase 280 U/L      Total Bilirubin 1.1 mg/dL      Globulin 3.3 gm/dL      A/G Ratio 1.1 g/dL      BUN/Creatinine Ratio 34.7     Anion Gap 12.0 mmol/L      eGFR 103.9 mL/min/1.73     Narrative:      GFR Normal >60  Chronic Kidney Disease <60  Kidney Failure <15      POC Glucose Once [[id number]]  (Abnormal) Collected: 05/22/23 0728    Specimen: Blood Updated: 05/22/23 0730     Glucose 281 mg/dL      Comment: Serial Number: [id number]:  [id number]       POC Glucose Once [[id number]]  (Abnormal) Collected: 05/21/23 2305    Specimen: Blood Updated: 05/21/23 2308     Glucose 245 mg/dL      Comment: Serial Number: [id number]:  [id number]       POC Glucose Once [[id number]]  (Abnormal) Collected: 05/21/23 2023    Specimen: Blood Updated: 05/21/23 2024     Glucose 236 mg/dL      Comment: Serial Number: [id number]:  [id number]       POC Glucose Once [[id number]]  (Abnormal) Collected: 05/21/23 1701    Specimen: Blood Updated: 05/21/23 1703     Glucose 282 mg/dL      Comment: Serial Number: [id number]:  [id number]           IMAGING REVIEWED:  No radiology results for the last day    Assessment & Plan      ASSESSMENT:    1. Pancytopenia: Perhaps secondary to the use of prominent tentative.  Both the white cell count and the hemoglobin seems to have improved, though she persists with very severe neutropenia.  Platelet count unchanged.  2. Accelerated phase CML, status post bone marrow biopsy on 11/3/2022.  Initiated on ponatinib and required dose reduction due to generalized body pain.  Was taking ponatinib 15 mg prior to hospitalization.  3. Alloimmunization: We will continue to hold off on transfusing platelets as there was no improvement with single donor platelets.  If persistently severely  thrombocytopenic may consider using the HLA matched platelets when they become available.  4. Bilateral subdural hematomas: No further symptoms at this time.  We will continue to follow.  5. Discussed with her and with her family the situation and the options available.  She wants to continue to receive treatment for now.     PLANS    1. As above.     Yifan Mckeon MD on 5/22/2023 at 1515

## 2023-05-22 NOTE — PLAN OF CARE
Goal Outcome Evaluation:   Patient pleasant and cooperative for duration of shift.   VSS. Several family members at bedside continuously. Updated patient and family on current POC.    Plan for Palliative Care consult Monday.    Awaiting HLA match for platelets.

## 2023-05-22 NOTE — PROGRESS NOTES
[facility]     Progress Note    Patient Name: Nieves Mc  : 1975  MRN: [id number]  Primary Care Physician:  Alida Latham APRN  Date of admission: 2023  Service date and time: 23 16:55 EDT  Subjective   Subjective     Chief Complaint: Left leg heaviness, low platelets, transfusion of platelet transfusion as outpatient, for 3 weeks back.       HPI:Patient Reports this patient has history of CML, she is getting chemotherapy at home, she has not been very good at compliant and blood check as outpatient, she fell 3 weeks back and she reported that since then she has lower back pain and she felt heaviness on her left leg, patient was found to have what appears to be old subdural hematoma and neurosurgery are consulted.  Also patient had pancytopenia she received blood transfusion today and platelet transfusion also consult hematology       Patient feels extremely depressed and tearful this morning, she wants to talk to her hematologist.  She is complaining of diffuse body aches, not responding to p.o. Tylenol      Objective   Objective     Vitals:   Temp:  [97.7 °F (36.5 °C)-98.6 °F (37 °C)] 98.6 °F (37 °C)  Heart Rate:  [] 96  Resp:  [15-20] 15  BP: ()/(54-87) 111/69  Physical Exam    Constitutional: Awake, alert   Eyes: PERRLA, sclerae anicteric, no conjunctival injection   HENT: NCAT, mucous membranes moist   Neck: Supple, no thyromegaly, no lymphadenopathy, trachea midline   Respiratory: Clear to auscultation bilaterally, nonlabored respirations    Cardiovascular: RRR, no murmurs, rubs, or gallops, palpable pedal pulses bilaterally   Gastrointestinal: Positive bowel sounds, soft, nontender, nondistended   Musculoskeletal: No bilateral ankle edema, no clubbing or cyanosis to extremities   Psychiatric: Depressed mood    Neurologic: Oriented x 3, strength symmetric in all extremities, Cranial Nerves grossly intact to confrontation, speech clear   Skin: No rashes      Result Review    Result Review:  I have personally reviewed the results from the time of this admission to 5/22/2023 16:55 EDT and agree with these findings:  [x]  Laboratory list / accordion  []  Microbiology  []  Radiology  []  EKG/Telemetry   []  Cardiology/Vascular   []  Pathology  []  Old records  []  Other:  Most notable findings include: WBC at 1.8      Assessment & Plan   Assessment / Plan       Active Hospital Problems:  Active Hospital Problems    Diagnosis    • **Pancytopenia    • Bilateral subdural hematomas    • Lumbar radiculopathy    • Traumatic subdural hemorrhage without loss of consciousness    • Chronic pain    • NICM (nonischemic cardiomyopathy)    • Type 2 diabetes mellitus with diabetic polyneuropathy, with long-term current use of insulin    • Depression with anxiety    • History of pulmonary embolism    • Medically noncompliant    • CML (chronic myelocytic leukemia)      Plan:    Pancytopenia  -WBC 1.8, Hgb 9.0  platelets 4  -received 1 unit of platelets in ambulatory care and developed a cough, chills, and nausea. Transfusion was stopped and patient was given IV decadron and bendadryl and sent to the ER  -plts now 8  -Solu-Medrol is given by recommendation of oncology  -start levaquin 500mg daily x7 days for prophylaxis per oncology notes  -neutropenic precautions  -oncology consulted      Remote subdural hemorrhage  -CT head: bilateral subdural hygromas suggesting remote subdural hemorrhage.    -Neurosurgery was consulted     CML  -per oncology notes no longer on Gleevec or hydroxyurea currently on Ponatinib 15mg followed      DM type 2  -glucose 375 at 11am  -check glucose AC/HS  -cont home lantus, premeal insulin, and SSI     Nonischemic Cardiomyopathy EF 44%  -cont home GDMT: BB, ARB, SGTL2, lasix     Chronic pain  -pain pump in place, managed by pain management outpatient. Pt just had pump adjusted yesterday      Anxiety  -cont home xanax    Palliative care was consulted, but  patient wants to discuss her prognosis with her primary hematologist.    DVT prophylaxis:  Mechanical DVT prophylaxis orders are present.    CODE STATUS:   Medical Intervention Limits: NO intubation (DNI); NO artificial nutrition  Level Of Support Discussed With: Patient; Next of Kin (If No Surrogate)  Code Status (Patient has no pulse and is not breathing): CPR (Attempt to Resuscitate)  Medical Interventions (Patient has pulse or is breathing): Limited Support  Release to patient: Routine Release    Disposition:  I expect patient to be discharged 2 to 3 days     Waqas Oneal MD

## 2023-05-22 NOTE — PLAN OF CARE
Goal Outcome Evaluation:  Plan of Care Reviewed With: patient        Progress: no change  Outcome Evaluation: Patient received am medications. Patient received several prns today including tylenol, atarax, and oxycodone. Patient is on room air. Palliative discussing options with patient.

## 2023-05-23 ENCOUNTER — APPOINTMENT (OUTPATIENT)
Dept: CT IMAGING | Facility: HOSPITAL | Age: 48
DRG: 809 | End: 2023-05-23
Payer: MEDICARE

## 2023-05-23 LAB
DEPRECATED RDW RBC AUTO: 50.8 FL (ref 37–54)
ERYTHROCYTE [DISTWIDTH] IN BLOOD BY AUTOMATED COUNT: 17.6 % (ref 12.3–15.4)
GLUCOSE BLDC GLUCOMTR-MCNC: 110 MG/DL (ref 70–105)
GLUCOSE BLDC GLUCOMTR-MCNC: 168 MG/DL (ref 70–105)
GLUCOSE BLDC GLUCOMTR-MCNC: 190 MG/DL (ref 70–105)
GLUCOSE BLDC GLUCOMTR-MCNC: 267 MG/DL (ref 70–105)
GLUCOSE BLDC GLUCOMTR-MCNC: 99 MG/DL (ref 70–105)
HCT VFR BLD AUTO: 24.2 % (ref 34–46.6)
HGB BLD-MCNC: 8.2 G/DL (ref 12–15.9)
MCH RBC QN AUTO: 27.5 PG (ref 26.6–33)
MCHC RBC AUTO-ENTMCNC: 33.7 G/DL (ref 31.5–35.7)
MCV RBC AUTO: 81.8 FL (ref 79–97)
PLATELET # BLD AUTO: 5 10*3/MM3 (ref 140–450)
PMV BLD AUTO: 8.7 FL (ref 6–12)
RBC # BLD AUTO: 2.96 10*6/MM3 (ref 3.77–5.28)
WBC NRBC COR # BLD: 1.1 10*3/MM3 (ref 3.4–10.8)

## 2023-05-23 PROCEDURE — 25010000002 ONDANSETRON PER 1 MG: Performed by: NURSE PRACTITIONER

## 2023-05-23 PROCEDURE — 85027 COMPLETE CBC AUTOMATED: CPT | Performed by: INTERNAL MEDICINE

## 2023-05-23 PROCEDURE — 70450 CT HEAD/BRAIN W/O DYE: CPT

## 2023-05-23 PROCEDURE — 97116 GAIT TRAINING THERAPY: CPT

## 2023-05-23 PROCEDURE — 63710000001 INSULIN LISPRO (HUMAN) PER 5 UNITS

## 2023-05-23 PROCEDURE — 63710000001 INSULIN GLARGINE PER 5 UNITS

## 2023-05-23 PROCEDURE — 80048 BASIC METABOLIC PNL TOTAL CA: CPT | Performed by: INTERNAL MEDICINE

## 2023-05-23 PROCEDURE — 82948 REAGENT STRIP/BLOOD GLUCOSE: CPT

## 2023-05-23 PROCEDURE — 99232 SBSQ HOSP IP/OBS MODERATE 35: CPT | Performed by: INTERNAL MEDICINE

## 2023-05-23 RX ORDER — MIDODRINE HYDROCHLORIDE 2.5 MG/1
2.5 TABLET ORAL EVERY 8 HOURS
Status: DISCONTINUED | OUTPATIENT
Start: 2023-05-23 | End: 2023-05-24

## 2023-05-23 RX ADMIN — ACETAMINOPHEN 650 MG: 325 TABLET, FILM COATED ORAL at 17:58

## 2023-05-23 RX ADMIN — ACETAMINOPHEN 650 MG: 325 TABLET, FILM COATED ORAL at 13:50

## 2023-05-23 RX ADMIN — OXYCODONE 5 MG: 5 TABLET ORAL at 06:12

## 2023-05-23 RX ADMIN — CITALOPRAM HYDROBROMIDE 10 MG: 20 TABLET ORAL at 09:32

## 2023-05-23 RX ADMIN — ALLOPURINOL 500 MG: 300 TABLET ORAL at 09:32

## 2023-05-23 RX ADMIN — GABAPENTIN 300 MG: 300 CAPSULE ORAL at 15:39

## 2023-05-23 RX ADMIN — Medication 10 ML: at 09:31

## 2023-05-23 RX ADMIN — INSULIN LISPRO 4 UNITS: 100 INJECTION, SOLUTION INTRAVENOUS; SUBCUTANEOUS at 09:50

## 2023-05-23 RX ADMIN — METOPROLOL SUCCINATE 25 MG: 25 TABLET, EXTENDED RELEASE ORAL at 09:33

## 2023-05-23 RX ADMIN — LEVOFLOXACIN 500 MG: 500 TABLET, FILM COATED ORAL at 09:33

## 2023-05-23 RX ADMIN — GABAPENTIN 300 MG: 300 CAPSULE ORAL at 20:39

## 2023-05-23 RX ADMIN — INSULIN LISPRO 10 UNITS: 100 INJECTION, SOLUTION INTRAVENOUS; SUBCUTANEOUS at 17:58

## 2023-05-23 RX ADMIN — GABAPENTIN 300 MG: 300 CAPSULE ORAL at 09:32

## 2023-05-23 RX ADMIN — Medication 10 ML: at 20:40

## 2023-05-23 RX ADMIN — MIRTAZAPINE 15 MG: 15 TABLET, FILM COATED ORAL at 20:39

## 2023-05-23 RX ADMIN — SODIUM CHLORIDE 500 ML: 0.9 INJECTION, SOLUTION INTRAVENOUS at 18:11

## 2023-05-23 RX ADMIN — OXYCODONE 5 MG: 5 TABLET ORAL at 20:39

## 2023-05-23 RX ADMIN — ONDANSETRON 4 MG: 2 INJECTION INTRAMUSCULAR; INTRAVENOUS at 06:07

## 2023-05-23 RX ADMIN — EMPAGLIFLOZIN 25 MG: 25 TABLET, FILM COATED ORAL at 09:33

## 2023-05-23 RX ADMIN — MIDODRINE HYDROCHLORIDE 2.5 MG: 2.5 TABLET ORAL at 19:30

## 2023-05-23 RX ADMIN — INSULIN GLARGINE 18 UNITS: 100 INJECTION, SOLUTION SUBCUTANEOUS at 20:39

## 2023-05-23 RX ADMIN — FUROSEMIDE 40 MG: 40 TABLET ORAL at 09:33

## 2023-05-23 RX ADMIN — HYDROXYZINE HYDROCHLORIDE 25 MG: 25 TABLET, FILM COATED ORAL at 06:46

## 2023-05-23 RX ADMIN — ALPRAZOLAM 0.5 MG: 0.5 TABLET ORAL at 20:39

## 2023-05-23 RX ADMIN — LOSARTAN POTASSIUM 25 MG: 25 TABLET, FILM COATED ORAL at 09:33

## 2023-05-23 NOTE — PROGRESS NOTES
[facility]     Progress Note    Patient Name: Nieves Mc  : 1975  MRN: [id number]  Primary Care Physician:  Alida Latham APRN  Date of admission: 2023  Service date and time: 23 15:55 EDT  Subjective   Subjective     Chief Complaint: low platelets, platelet transfusion reaction     HPI:47 y.o. female with PMH of CML followed outpatient by Dr. Lerma. She was seen at the cancer center today 2023 after recent missed visits. Labwork showed pancytopenia with WBC 2.6, Hgb 8.6, platelets 7.  Patient was sent to ambulatory care for 2 units of platelets. She apparently had a reaction where she became nauseous without vomiting and started coughing and had chills. The transfusion was stopped and she was given decadron and benadryl. Her repeat platelets were 8 so it was decided to send her to the ED for hospital admission and platelet transfusion. The patient reports her petechiae on her upper chest and lower legs and inside her lips started today. She denied any shortness of breath. She has no further cough or nausea. She has been fatigued lately. She fell 2 or 3 weeks ago and hit the right side of her head. She denied any vision changes or current headache. She has been ambulating at home.       Patient Reports of right upper and right lower extremity weakness, she denies any depressed mood today.      Objective   Objective     Vitals:   Temp:  [98.2 °F (36.8 °C)-99 °F (37.2 °C)] 98.6 °F (37 °C)  Heart Rate:  [] 114  Resp:  [14-20] 20  BP: ()/(61-88) 109/72  Physical Exam    Constitutional: Awake, alert   Eyes: PERRLA, sclerae anicteric, no conjunctival injection   HENT: NCAT, mucous membranes moist   Neck: Supple, no thyromegaly, no lymphadenopathy, trachea midline   Respiratory: Clear to auscultation bilaterally, nonlabored respirations    Cardiovascular: RRR, no murmurs, rubs, or gallops, palpable pedal pulses bilaterally   Gastrointestinal: Positive bowel sounds,  soft, nontender, nondistended   Musculoskeletal: No bilateral ankle edema, no clubbing or cyanosis to extremities   Psychiatric: Appropriate affect, cooperative   Neurologic: Oriented x 3, strength symmetric in all extremities, Cranial Nerves grossly intact to confrontation, speech clear   Skin: No rashes     Result Review    Result Review:  I have personally reviewed the results from the time of this admission to 5/23/2023 15:55 EDT and agree with these findings:  []  Laboratory list / accordion  []  Microbiology  []  Radiology  []  EKG/Telemetry   []  Cardiology/Vascular   []  Pathology  []  Old records  []  Other:  Most notable findings include: WBC count 1.1, platelet count of 5, hemoglobin of 8.2    Assessment & Plan   Assessment / Plan       Active Hospital Problems:  Active Hospital Problems    Diagnosis    • **Pancytopenia    • Bilateral subdural hematomas    • Lumbar radiculopathy    • Traumatic subdural hemorrhage without loss of consciousness    • Chronic pain    • NICM (nonischemic cardiomyopathy)    • Type 2 diabetes mellitus with diabetic polyneuropathy, with long-term current use of insulin    • Depression with anxiety    • History of pulmonary embolism    • Medically noncompliant    • CML (chronic myelocytic leukemia)      Plan:    Pancytopenia  -WBC 2.6, Hgb 8.6, platelets 7  -received 1 unit of platelets in ambulatory care and developed a cough, chills, and nausea. Transfusion was stopped and patient was given IV decadron and bendadryl and sent to the ER  -plts now 8  -spoke with oncology who recommended pretreatment with IV solumedrol, IV benadryl, and tylenol prior to another unit of platelets  -start levaquin 500mg daily x7 days for prophylaxis per oncology notes  -neutropenic precautions  -oncology consulted      Remote subdural hemorrhage  -CT head: bilateral subdural hygromas suggesting remote subdural hemorrhage.  There is local mass effect with sulcal and effacement and decreased of the  size of lateral ventricles but no evidence of herniation.  No acute intracranial hemorrhage.  -pt reports a fall 2-3 weeks ago per patient  -no neurological deficits on exam  -continue neuro checks   -consult neurosurgery   -repeat CT head in am     CML  -per oncology notes no longer on Gleevec or hydroxyurea currently on Ponatinib 15mg followed      DM type 2  -glucose 375 at 11am  -check glucose AC/HS  -cont home lantus, premeal insulin, and SSI     Nonischemic Cardiomyopathy EF 44%  -cont home GDMT: BB, ARB, SGTL2, lasix     Chronic pain  -pain pump in place, managed by pain management outpatient. Pt just had pump adjusted yesterday      Anxiety  -cont home xanax      Right upper and lower extremity weakness: will order a ct scan of the brain with no contrast         DVT prophylaxis: contraindicated due to plts of 8    DVT prophylaxis:  Mechanical DVT prophylaxis orders are present.    CODE STATUS:   Medical Intervention Limits: NO intubation (DNI); NO artificial nutrition  Level Of Support Discussed With: Patient; Next of Kin (If No Surrogate)  Code Status (Patient has no pulse and is not breathing): CPR (Attempt to Resuscitate)  Medical Interventions (Patient has pulse or is breathing): Limited Support  Release to patient: Routine Release    Disposition:  I expect patient to be discharged in 2 to 3 days   Waqas Oneal MD

## 2023-05-23 NOTE — PLAN OF CARE
Goal Outcome Evaluation:      Pt has been resting through the night after receiving her pain medicine. Pt family has remained at bedside and attentive to her. Pt hear rate has slowly decreased since the beginning of the shift, continues to remain in the 90s. Blood pressures have been wnl through the night. Pt continues to remain on room air with no distress noted. Rash/petechia mainly noted on right hand and back more so than her LE. VSS at this time with no issues noted, call light has remained within reach and able to make needs known

## 2023-05-23 NOTE — PROGRESS NOTES
Hematology/Oncology Inpatient Progress Note    PATIENT NAME: Nieves Mc  : 1975  MRN: [id number]    CHIEF COMPLAINT:     HISTORY OF PRESENT ILLNESS:      Nieves Mc is a 47 y.o. female who presented to [facility] on 2023 with complaints of nausea, vomiting, coughing and chills during a platelet transfusion.  Past medical history of CML on ponatinib, cardiomyopathy, chronic pain, uncontrolled type 1 diabetes, chronic anemia, insomnia, anxiety and depression, pulmonary embolism on chronic anticoagulation with Xarelto, medical noncompliance.       Evaluation in the ED: Patient reported a fall 2 to 3 weeks ago during which she hit the right side of her head a CT of the head was done showing bilateral subdural hygromas suggesting remote subdural hemorrhage; local mass effect with sulcal and effacement and decreased of the size of lateral ventricles but no evidence of herniation; no acute intracranial hemorrhage.  The patient was admitted for further treatment of her pancytopenia and remote subdural hemorrhage with plans for pretreatment with IV Solu-Medrol, Tylenol and Benadryl prior to transfusing another unit of platelets.        23  Hematology/Oncology was consulted on a patient known to us and followed in the office by Dr. Lerma for her diagnosis of CML.  Patient initially presented in  when she was seen in consultation while hospitalized.  At that time the patient was on imatinib.  In  she had progressive thrombocytosis and was transition to nilotinib.  After that she was lost to follow-up until she was once again seen during a hospitalization in .  She was continued on nilotinib and hydroxyurea.  In 2022 she had once again been lost to follow-up for 3 months when she presented in the office after not taking nilotinib during that time.  She had shortness of breath and cough for which a 2D echocardiogram was done and showed a reduced EF of 41 to 45%.  Due to  cardiomyopathy she was transitioned to imatinib and hydroxyurea.  In November 2022 bone marrow biopsy showed her to be 18 accelerated phase CML and that the imatinib was not controlling her malignancy well and she was initiated on ponatinib 45 mg p.o. daily.  She had significant body pain with this dose and for that reason was reduced to 30 mg p.o. daily.  She was once again lost to follow-up for approximately 6 weeks during that time she self reduced to ponatinib 15 mg daily.  She was seen in the office on 5/17/2023 at which time her platelet count was noted to be 8000 and she was severely neutropenic.  Her ponatinib was held and she was sent to the ambulatory care unit for 2 units of platelets to be transfused and a platelet level to be checked posttransfusion for further planning.     Patient has a history of CML on ponatinib.  Patient has been noncompliant with lab evaluations as recommended.  She has been taking 50 mg of ponatinib.  She comes to the office with significant pancytopenia, severe neutropenia, platelets of 7000 and anemia.  Patient was sent to the ambulatory care for platelet transfusions, she developed chills nausea but did receive platelets.  Repeat platelet check was no significantly changed at 8000 for that reason patient was sent to the ER to be admitted to the hospital.  She has a history of falls approximately 2 weeks earlier.  While in the ER she had CT of the head which showed old subdural hematoma.  Patient denies nausea vomiting fevers or chills.  She has not been able to come to the office due to social issues, transportation issues     He/She  has a past medical history of Bone pain, COVID-19 virus infection (01/12/2022), Diabetes mellitus, Extremity pain, Leg pain, Migraine, Pulmonary embolism, and Vision loss.     PCP: Alida Latham, JP    Subjective    5/23/2023: Feeling better and without new symptoms. No bleeding. No more petechiae. Able to eat and no nausea or vomiting.      ROS:    Review of Systems   Constitutional: Positive for activity change, appetite change, fatigue and unexpected weight change. Negative for chills, diaphoresis and fever.   HENT: Negative for congestion, dental problem, drooling, ear discharge, ear pain, facial swelling, hearing loss, mouth sores, nosebleeds, postnasal drip, rhinorrhea, sinus pressure, sinus pain, sneezing, sore throat, tinnitus, trouble swallowing and voice change.    Eyes: Negative for photophobia, pain, discharge, redness, itching and visual disturbance.   Respiratory: Negative for apnea, cough, choking, chest tightness, shortness of breath, wheezing and stridor.    Cardiovascular: Negative for chest pain, palpitations and leg swelling.   Gastrointestinal: Negative for abdominal distention, abdominal pain, anal bleeding, blood in stool, constipation, diarrhea, nausea, rectal pain and vomiting.   Endocrine: Negative for cold intolerance, heat intolerance, polydipsia and polyuria.   Genitourinary: Negative for decreased urine volume, difficulty urinating, dysuria, flank pain, frequency, genital sores, hematuria and urgency.   Musculoskeletal: Negative for arthralgias, back pain, gait problem, joint swelling, myalgias, neck pain and neck stiffness.   Skin: Negative for color change, pallor and rash.   Neurological: Positive for weakness. Negative for dizziness, tremors, seizures, syncope, facial asymmetry, speech difficulty, light-headedness, numbness and headaches.   Hematological: Negative for adenopathy. Does not bruise/bleed easily.   Psychiatric/Behavioral: Negative for agitation, behavioral problems, confusion, decreased concentration, hallucinations, self-injury, sleep disturbance and suicidal ideas. The patient is not nervous/anxious.       MEDICATIONS:    Scheduled Meds:  allopurinol, 500 mg, Oral, Daily  budesonide-formoterol, 2 puff, Inhalation, BID - RT  citalopram, 10 mg, Oral, Daily  empagliflozin, 25 mg, Oral, Daily  furosemide,  "40 mg, Oral, Daily  gabapentin, 300 mg, Oral, TID  insulin glargine, 1-200 Units, Subcutaneous, Nightly - Glucommander  insulin lispro, 1-200 Units, Subcutaneous, 4x Daily With Meals & Nightly  levoFLOXacin, 500 mg, Oral, Daily  losartan, 25 mg, Oral, Daily  metoprolol succinate XL, 25 mg, Oral, Daily  mirtazapine, 15 mg, Oral, Nightly  senna-docusate sodium, 2 tablet, Oral, BID  sodium chloride, 10 mL, Intravenous, Q12H       Continuous Infusions:  pain,        PRN Meds:  •  acetaminophen **OR** acetaminophen **OR** acetaminophen  •  ALPRAZolam  •  senna-docusate sodium **AND** polyethylene glycol **AND** bisacodyl **AND** bisacodyl  •  Calcium Replacement - Follow Nurse / BPA Driven Protocol  •  dextrose  •  dextrose  •  glucagon (human recombinant)  •  hydrocortisone  •  hydrOXYzine  •  insulin lispro  •  Magnesium Standard Dose Replacement - Follow Nurse / BPA Driven Protocol  •  melatonin  •  ondansetron **OR** ondansetron  •  oxyCODONE  •  Phosphorus Replacement - Follow Nurse / BPA Driven Protocol  •  Potassium Replacement - Follow Nurse / BPA Driven Protocol  •  prochlorperazine  •  sodium chloride  •  sodium chloride     ALLERGIES:  No Known Allergies    Objective    VITALS:   BP 99/64 (BP Location: Right arm, Patient Position: Lying)   Pulse 113   Temp 99 °F (37.2 °C) (Oral)   Resp 18   Ht 162.6 cm (64\")   Wt 56.7 kg (125 lb)   LMP 05/25/2022 (Approximate)   SpO2 95%   BMI 21.46 kg/m²     PHYSICAL EXAM: (performed by MD)  Physical Exam  Constitutional:       General: She is not in acute distress.     Appearance: Normal appearance. She is ill-appearing. She is not toxic-appearing or diaphoretic.   HENT:      Head: Normocephalic and atraumatic.      Right Ear: External ear normal.      Left Ear: External ear normal.      Nose: Nose normal.      Mouth/Throat:      Mouth: Mucous membranes are moist.      Pharynx: Oropharynx is clear. No oropharyngeal exudate or posterior oropharyngeal erythema.   Eyes: "      General: No scleral icterus.        Right eye: No discharge.         Left eye: No discharge.      Conjunctiva/sclera: Conjunctivae normal.      Pupils: Pupils are equal, round, and reactive to light.   Cardiovascular:      Rate and Rhythm: Normal rate and regular rhythm.      Pulses: Normal pulses.      Heart sounds: No murmur heard.    No friction rub. No gallop.   Pulmonary:      Effort: Pulmonary effort is normal. No respiratory distress.      Breath sounds: No stridor. No wheezing, rhonchi or rales.   Abdominal:      General: Abdomen is flat. Bowel sounds are normal. There is no distension.      Palpations: Abdomen is soft. There is no mass.      Tenderness: There is no abdominal tenderness. There is no right CVA tenderness, left CVA tenderness, guarding or rebound.      Hernia: No hernia is present.   Musculoskeletal:         General: No swelling, tenderness, deformity or signs of injury.      Cervical back: No rigidity.      Right lower leg: No edema.      Left lower leg: No edema.   Lymphadenopathy:      Cervical: No cervical adenopathy.   Skin:     Coloration: Skin is pale. Skin is not jaundiced.      Findings: No bruising, lesion or rash.   Neurological:      General: No focal deficit present.      Mental Status: She is alert and oriented to person, place, and time. Mental status is at baseline.      Cranial Nerves: No cranial nerve deficit.      Motor: No weakness.      Gait: Gait normal.   Psychiatric:         Mood and Affect: Mood normal.         Behavior: Behavior normal.         Thought Content: Thought content normal.         Judgment: Judgment normal.     JAMEL Truong MD performed the physical exam on 5/23/2023 as documented above.     RECENT LABS:  Lab Results (last 24 hours)     Procedure Component Value Units Date/Time    POC Glucose Once [[id number]]  (Abnormal) Collected: 05/23/23 1155    Specimen: Blood Updated: 05/23/23 1156     Glucose 110 mg/dL      Comment: Serial Number:  [id number]:  [id number]       POC Glucose Once [[id number]]  (Abnormal) Collected: 05/23/23 0942    Specimen: Blood Updated: 05/23/23 0943     Glucose 168 mg/dL      Comment: Serial Number: [id number]:  [id number]       POC Glucose Once [[id number]]  (Normal) Collected: 05/23/23 0724    Specimen: Blood Updated: 05/23/23 0725     Glucose 99 mg/dL      Comment: Serial Number: [id number]:  [id number]       CBC (No Diff) [[id number]]  (Abnormal) Collected: 05/22/23 2326    Specimen: Blood Updated: 05/23/23 0001     WBC 1.10 10*3/mm3      RBC 2.96 10*6/mm3      Hemoglobin 8.2 g/dL      Hematocrit 24.2 %      MCV 81.8 fL      MCH 27.5 pg      MCHC 33.7 g/dL      RDW 17.6 %      RDW-SD 50.8 fl      MPV 8.7 fL      Platelets 5 10*3/mm3     POC Glucose Once [[id number]]  (Abnormal) Collected: 05/22/23 2043    Specimen: Blood Updated: 05/22/23 2044     Glucose 189 mg/dL      Comment: Serial Number: [id number]:  [id number]       POC Glucose Once [[id number]]  (Abnormal) Collected: 05/22/23 1710    Specimen: Blood Updated: 05/22/23 1713     Glucose 171 mg/dL      Comment: Serial Number: [id number]:  [id number]           IMAGING REVIEWED:  No radiology results for the last day    Assessment & Plan      ASSESSMENT:    1. Pancytopenia: Perhaps secondary to the use of ponatinib. The blood count shows perhaps further improvement of the platelet count spontaneously. Her hemoglobin is low but does not require transfusion again today. Persists with very severe neutropenia but remains afebrile.   2. Accelerated phase CML: Treated with ponatinib up until this last admission.   3. Alloimmunization: Awaiting the availability of HLA matched platelets. For now continue to minimize blood draws and continue to monitor. No transfusion today.   4. Bilateral subdural hematomas: No further bleeding. Discussed with her. No new symptoms.      PLANS    1. As above.      Yifan Truong MD on 5/23/2023 at 12:39 PM.

## 2023-05-23 NOTE — PROGRESS NOTES
Nutrition Services    Patient Name: Nieves Mc  YOB: 1975  MRN: [id number]  Admission date: 5/17/2023    PPE Documentation        PPE Worn By Provider Did not enter room for this encounter   PPE Worn By Patient  N/A     PROGRESS NOTE      Encounter Information: Progress note to check on PO intakes. Pt is eating and tolerating PO with adequate intake. May benefit from diet modification to help optimize glucose control.        PO Diet: Diet: Regular/House Diet; Neutropenic/Low Microbial; Texture: Regular Texture (IDDSI 7); Fluid Consistency: Thin (IDDSI 0)   PO Supplements: Boost Glucose Control q daily (Provides 190 kcals, 16 g protein if consumed)   Magic Cups at lunch/dinner (Provides 580 kcals, 18 g protein if consumed) per pt request   PO Intake:  % intake at meals        Current nutrition support:    Nutrition support review:        Labs (reviewed below): Hyperglycemia - C/W DM; possibly exacerbated by medication side-effects; monitor       GI Function:  BM 5/22       Nutrition Intervention Updates: Add consistent carbohydrate modification to diet order.       Results from last 7 days   Lab Units 05/22/23  0857 05/21/23  0721 05/20/23  0152   SODIUM mmol/L 144 143 143   POTASSIUM mmol/L 3.6 4.3 4.7   CHLORIDE mmol/L 103 104 104   CO2 mmol/L 29.0 25.0 28.0   BUN mg/dL 25* 23* 24*   CREATININE mg/dL 0.72 0.66 0.59   CALCIUM mg/dL 9.1 9.1 8.6   BILIRUBIN mg/dL 1.1 2.0* 1.1   ALK PHOS U/L 280* 282* 229*   ALT (SGPT) U/L 38* 36* 22   AST (SGOT) U/L 28 26 14   GLUCOSE mg/dL 224* 525* 291*     Results from last 7 days   Lab Units 05/22/23  2326 05/20/23  1413 05/20/23  0152 05/18/23  0657 05/17/23  1108   MAGNESIUM mg/dL  --   --  1.6  --  1.7   HEMOGLOBIN g/dL 8.2*   < > 7.5*   < > 8.6*   HEMATOCRIT % 24.2*   < > 22.1*   < > 25.1*    < > = values in this interval not displayed.     COVID19   Date Value Ref Range Status   07/27/2022 Not Detected Not Detected - Ref. Range Final     Lab Results    Component Value Date    HGBA1C 7.60 (H) 05/21/2023     RD to follow up per protocol.    Electronically signed by:  Mamta Gomez RD  05/23/23 17:27 EDT

## 2023-05-23 NOTE — PLAN OF CARE
Goal Outcome Evaluation:      Assessment: Nieves Mc presents with functional mobility impairments which indicate the need for skilled intervention. Pt has weakness and reports burning/tingling pain LLE from foot to hip.  Pt still with critically low platelets, only getting up in room with assist.  Educated on use of gait belt and always using walker for fall prevention.  Limited therapy intervention due to platelet count.  Discussed home needs, recommend bedside commode at d/c . Pt has RW at home and states a family member has a w/c she can use. Tolerating session today without incident. Will continue to follow and progress as tolerated.

## 2023-05-23 NOTE — DISCHARGE PLACEMENT REQUEST
"New referral to [facility], per note 5/19/23.  BLANCHE Bowman    Phone: [phone]  Cell: [phone]  Fax: [phone]  [email or web address]    ______________________________________________________________________________________      J Luis Kowalski (47 y.o. Female)       Date of Birth   1975    Social Security Number       Address   [address] [place]    Home Phone   [phone]    MRN   [id number]       [employer]   None    Marital Status   Legally                             Admission Date   5/17/23    Admission Type   Emergency    Admitting Provider   Mónica Tillman MD    Attending Provider   Waqas Oneal MD    Department, Room/Bed   [facility], 2127/1       Discharge Date       Discharge Disposition       Discharge Destination                                 Attending Provider: Waqas Oneal MD    Allergies: No Known Allergies    Isolation: Contact   Infection: ESBL E coli (02/15/23)   Code Status: CPR    Ht: 162.6 cm (64\")   Wt: 56.7 kg (125 lb)    Admission Cmt: None   Principal Problem: Pancytopenia [D61.818]                   Active Insurance as of 5/17/2023       Primary Coverage       Payor Plan Insurance Group Employer/Plan Group    ANTHEM MEDICARE REPLACEMENT ANTHEM MEDICARE ADVANTAGE [id number]       Payor Plan Address Payor Plan Phone Number Payor Plan Fax Number Effective Dates    [address] [phone]  1/1/2023 - None Entered    [facility] [address]         Subscriber Name Subscriber Birth Date Member ID       J LUIS KOWALSKI 1975 [id number]                     Emergency Contacts        (Rel.) Home Phone Work Phone Mobile Phone    Sayra Cabezas (Mother) [phone] -- --    Primitivo Kowalski (Son) -- -- [phone]                 History & Physical        Roma Brown APRN at 05/17/23 2059       Attestation signed by Mónica Tillman MD at 05/19/23 0085  "   I was available to provide additional medical advice on behalf of the APRN at the time of admission on an as needed basis. I did not participate in a beside evaluation of the patient or provide direct medical care services to the patient.                       [facility] Medicine Services  History & Physical    Patient Name: Nieves Mc  : 1975  MRN: [id number]  Primary Care Physician:  Alida Latham APRN  Date of admission: 2023  Date and Time of Service: 2023 at 2030    Subjective       Chief Complaint: low platelets, platelet transfusion reaction     History of Present Illness: Nieves Mc is a 47 y.o. female with PMH of CML followed outpatient by Dr. Lerma. She was seen at the cancer center today 2023 after recent missed visits. Labwork showed pancytopenia with WBC 2.6, Hgb 8.6, platelets 7.  Patient was sent to ambulatory care for 2 units of platelets. She apparently had a reaction where she became nauseous without vomiting and started coughing and had chills. The transfusion was stopped and she was given decadron and benadryl. Her repeat platelets were 8 so it was decided to send her to the ED for hospital admission and platelet transfusion. The patient reports her petechiae on her upper chest and lower legs and inside her lips started today. She denied any shortness of breath. She has no further cough or nausea. She has been fatigued lately. She fell 2 or 3 weeks ago and hit the right side of her head. She denied any vision changes or current headache. She has been ambulating at home.     In the ED the patient had CT head that showed bilateral subdural hygromas suggesting remote subdural hemorrhage.  There is local mass effect with sulcal and effacement and decreased of the size of lateral ventricles but no evidence of herniation.  No acute intracranial hemorrhage.  Recommend follow-up, further evaluation as clinically indicated.  Patient with no neurological  deficits on exam.  Vital signs stable.  This provider was under the impression that neurosurgery was notified but no consult seen in the computer. Documentation shows APRN discussed case with ER physician and they agreed to contact the hospitalist for admission for further treatment of pancytopenia and remote subdural hemorrhage.     This provider spoke with oncology Dr. Lerma who recommended pre treatment with IV solumedrol, tylenol, and benadryl prior to transfusing another unit of platelets.     Review of Systems   HENT: Negative.    Eyes: Negative.    Cardiovascular: Negative.    Respiratory: Negative.    Endocrine: Negative.    Hematologic/Lymphatic: Negative.    Skin: Negative.         bruising   Musculoskeletal: Negative.    Gastrointestinal: Negative.    Genitourinary: Negative.    Neurological: Positive for weakness.   Psychiatric/Behavioral: Negative.    Allergic/Immunologic: Negative.    All other systems reviewed and are negative.       Personal History     Past Medical History:   Diagnosis Date    Bone pain     COVID-19 virus infection 2022    Diabetes mellitus     Extremity pain     Carlin. legs pain    Leg pain     left leg greater    Migraine     Pulmonary embolism     Vision loss     doing surgery       Past Surgical History:   Procedure Laterality Date    BONE MARROW BIOPSY      BREAST SURGERY      BRONCHOSCOPY N/A 2022    Procedure: BRONCHOSCOPY bilateral lung washing;  Surgeon: Charlene Camara MD;  Location: [facility] ENDOSCOPY;  Service: Pulmonary;  Laterality: N/A;  post: rule out infection vs transfusion lung injury     SECTION      CHOLECYSTECTOMY      EYE SURGERY      laser surgery due  to hemmorage--- 2021-- another surgery  lt eye11/15/21    RETINAL DETACHMENT SURGERY      SPINE SURGERY      Lombardi spinal block    TUBAL ABDOMINAL LIGATION         Family History: family history includes Diabetes in her maternal grandmother and mother; Heart attack in her maternal grandmother;  Stroke in her maternal grandmother. Otherwise pertinent FHx was reviewed and not pertinent to current issue.    Social History:  reports that she has never smoked. She has never used smokeless tobacco. She reports that she does not drink alcohol and does not use drugs.    Home Medications:  Prior to Admission Medications       Prescriptions Last Dose Informant Patient Reported? Taking?    acetaminophen (TYLENOL) 325 MG tablet   Yes Yes    Take 2 tablets by mouth Every 4 (Four) Hours As Needed.    allopurinol (ZYLOPRIM) 100 MG tablet   Yes Yes    Take 5 tablets by mouth Daily.    ALPRAZolam (Xanax) 0.5 MG tablet   No Yes    Take 1 tablet by mouth At Night As Needed for Anxiety.    budesonide-formoterol (SYMBICORT) 160-4.5 MCG/ACT inhaler   No Yes    Inhale 2 puffs 2 (Two) Times a Day.    citalopram (CeleXA) 10 MG tablet   Yes Yes    Take 1 tablet by mouth Daily.    dapagliflozin Propanediol (Farxiga) 10 MG tablet   No Yes    Take 10 mg by mouth Daily.    furosemide (LASIX) 40 MG tablet   Yes Yes    Take 1 tablet by mouth Daily.    gabapentin (NEURONTIN) 300 MG capsule   Yes Yes    Take 1 capsule by mouth Daily.    hydrOXYzine (ATARAX) 25 MG tablet   Yes Yes    Take 1 tablet by mouth Every 8 (Eight) Hours As Needed.    Insulin Glargine (BASAGLAR KWIKPEN) 100 UNIT/ML injection pen   No Yes    Inject 20 Units under the skin into the appropriate area as directed Daily.    Insulin Lispro (Admelog) 100 UNIT/ML injection   Yes Yes    Inject 6 Units under the skin into the appropriate area as directed 3 (Three) Times a Day As Needed for High Blood Sugar.    losartan (COZAAR) 25 MG tablet   No Yes    Take 1 tablet by mouth Daily.    metoprolol succinate XL (TOPROL-XL) 25 MG 24 hr tablet   No Yes    Take 1 tablet by mouth Daily.    mirtazapine (REMERON) 15 MG tablet   No Yes    Take 1 tablet by mouth every night at bedtime.    ondansetron ODT (ZOFRAN-ODT) 8 MG disintegrating tablet   No Yes    Place 1 tablet on the tongue  Every 8 (Eight) Hours As Needed for Nausea or Vomiting.    Continuous Blood Gluc Sensor (FreeStyle Zahira 2 Sensor) misc   Yes No    levoFLOXacin (Levaquin) 500 MG tablet   No No    Take 1 tablet by mouth Daily.    potassium chloride (K-DUR,KLOR-CON) 20 MEQ CR tablet   Yes No    Take 1 tablet by mouth Daily.    prochlorperazine (COMPAZINE) 10 MG tablet   No No    Take 1 tablet by mouth Every 6 (Six) Hours As Needed for Nausea or Vomiting.    promethazine (PHENERGAN) 25 MG tablet   Yes No    Take 1 tablet by mouth As Needed.    tiZANidine (ZANAFLEX) 4 MG tablet   Yes No    Take 1 tablet by mouth Daily.    traZODone (DESYREL) 50 MG tablet   Yes No    Take 25 mg by mouth every night at bedtime.              Allergies:  No Known Allergies    Objective       Vitals:   Temp:  [97.9 °F (36.6 °C)-98.9 °F (37.2 °C)] 98.9 °F (37.2 °C)  Heart Rate:  [] 100  Resp:  [12-18] 16  BP: (110-159)/(73-87) 137/79    Physical Exam  Vitals and nursing note reviewed.   HENT:      Head: Normocephalic and atraumatic.      Mouth/Throat:      Comments: Mouth sores  Eyes:      Extraocular Movements: Extraocular movements intact.      Pupils: Pupils are equal, round, and reactive to light.   Cardiovascular:      Rate and Rhythm: Normal rate and regular rhythm.      Pulses: Normal pulses.      Heart sounds: Normal heart sounds.   Pulmonary:      Effort: Pulmonary effort is normal.      Breath sounds: Normal breath sounds.   Abdominal:      General: Bowel sounds are normal.      Palpations: Abdomen is soft.      Tenderness: There is no abdominal tenderness.   Musculoskeletal:         General: Normal range of motion.   Skin:     General: Skin is warm and dry.      Findings: Bruising present.   Neurological:      Mental Status: She is alert and oriented to person, place, and time.   Psychiatric:         Mood and Affect: Mood normal.         Behavior: Behavior normal.          Result Review    Result Review:  I have personally reviewed the  results from the time of this admission to 5/17/2023 22:29 EDT and agree with these findings:  [x]  Laboratory  []  Microbiology  [x]  Radiology  []  EKG/Telemetry   []  Cardiology/Vascular   []  Pathology  [x]  Old records      Assessment & Plan        Active Hospital Problems:  Active Hospital Problems    Diagnosis     **Pancytopenia     Traumatic subdural hemorrhage without loss of consciousness     Chronic pain     NICM (nonischemic cardiomyopathy)     Type 2 diabetes mellitus with diabetic polyneuropathy, with long-term current use of insulin     Depression with anxiety     History of pulmonary embolism     Medically noncompliant     CML (chronic myelocytic leukemia)      Plan:     Pancytopenia  -WBC 2.6, Hgb 8.6, platelets 7  -received 1 unit of platelets in ambulatory care and developed a cough, chills, and nausea. Transfusion was stopped and patient was given IV decadron and bendadryl and sent to the ER  -plts now 8  -spoke with oncology who recommended pretreatment with IV solumedrol, IV benadryl, and tylenol prior to another unit of platelets  -start levaquin 500mg daily x7 days for prophylaxis per oncology notes  -neutropenic precautions  -oncology consulted     Remote subdural hemorrhage  -CT head: bilateral subdural hygromas suggesting remote subdural hemorrhage.  There is local mass effect with sulcal and effacement and decreased of the size of lateral ventricles but no evidence of herniation.  No acute intracranial hemorrhage.  -pt reports a fall 2-3 weeks ago per patient  -no neurological deficits on exam  -continue neuro checks   -consult neurosurgery   -repeat CT head in am    CML  -per oncology notes no longer on Gleevec or hydroxyurea currently on Ponatinib 15mg followed     DM type 2  -glucose 375 at 11am  -check glucose AC/HS  -cont home lantus, premeal insulin, and SSI    Nonischemic Cardiomyopathy EF 44%  -cont home GDMT: BB, ARB, SGTL2, lasix    Chronic pain  -pain pump in place, managed by  pain management outpatient. Pt just had pump adjusted yesterday     Anxiety  -cont home xanax      DVT prophylaxis: contraindicated due to plts of 8    CODE STATUS:    Level Of Support Discussed With: Patient  Code Status (Patient has no pulse and is not breathing): CPR (Attempt to Resuscitate)  Medical Interventions (Patient has pulse or is breathing): Full Support    Admission Status:  I believe this patient meets inpatient status.    I discussed the patient's findings and my recommendations with patient, family and nursing staff.    This patient has been examined wearing appropriate Personal Protective Equipment. 05/17/23      Electronically signed by JP Richard, 05/17/23, 10:30 PM EDT.    Electronically signed by Mónica Tillman MD at 05/19/23 0151

## 2023-05-23 NOTE — PLAN OF CARE
Goal Outcome Evaluation:  Plan of Care Reviewed With: patient        Progress: no change  Outcome Evaluation: Patient received am medications, refused laxative. Patient is on room air.

## 2023-05-23 NOTE — PROGRESS NOTES
Palliative Care Social Work Progress Note    Code Status:full code    Goals of Care: Limited Additonal Intervention     Narrative: Palliative care  met with pt to assess for goals of care.  Pt's daughter and  at bedside.  Pt reports she wants to complete a new healthcare surrogate form to supercede the existing one done in 2021.  She reports that she prefers for there daughter Brooklynn Reeces to be the healthcare decision-maker.  Pt's  at bedside also encouraged the daughter to be surrogate.  Pt processed feelings regarding further aggressive treatment at length.  Pt looks forward with meeting with Dr. Lerma tomorrow for more insight regarding disease and treatment options. Emotional support provided.    Plan:  -Healthcare surrogate and living will completed, scanned to HIM  -Will continue to follow.          BLANCHE Stovall

## 2023-05-23 NOTE — THERAPY TREATMENT NOTE
"Subjective: Pt agreeable to therapeutic plan of care. C/o LLE pain and weakness.  Per RN, pt has been getting up to bathroom with assist.     Objective:     Bed mobility - Modified-Independent  Transfers - SBA  Ambulation - 15 feet CGA    Therapeutic Exercise -educated pt on LE ROM exercises supine and seated, noted weakness and ms wasting, L worse than R    Vitals: WNL    Pain: 5 VAS   Location: LLE, headache  Intervention for pain: Repositioned and Therapeutic Presence    Education: Transfer Training and Gait Training    Assessment: Nieves Mc presents with functional mobility impairments which indicate the need for skilled intervention. Pt has weakness and reports burning/tingling pain LLE from foot to hip.  Pt still with critically low platelets, only getting up in room with assist.  Educated on use of gait belt and always using walker for fall prevention.  Limited therapy intervention due to platelet count.  Discussed home needs, recommend bedside commode at d/c . Pt has RW at home and states a family member has a w/c she can use. Tolerating session today without incident. Will continue to follow and progress as tolerated.     Plan/Recommendations:   Low Intensity Therapy recommended post-acute care - This is recommended as therapy feels this patient would require 2-3 visits per week. OP or HH would be the best option depending on patient's home bound status. Consider, if the patient has other  \"skilled\" needs such as wounds, IV antibiotics, etc. Combined with \"low intensity\" could also equate to a SNF. If patient is medically complex, consider LTAC.. Pt requires BSC at discharge.     Pt desires Home with family assist and and Home Health at discharge. Pt cooperative; agreeable to therapeutic recommendations and plan of care.         Post-Tx Position: Supine with HOB Elevated and Call light and personal items within reach  PPE: gloves and surgical mask      "

## 2023-05-23 NOTE — NURSING NOTE
Patient now complains of numb right forearm, states it has been numb since last night. MD aware, CT head ordered per MD Oneal. Patient now states her right leg is also numb, MD notified. Patient currently being taken down to CT.

## 2023-05-24 ENCOUNTER — HOME HEALTH ADMISSION (OUTPATIENT)
Dept: HOME HEALTH SERVICES | Facility: HOME HEALTHCARE | Age: 48
End: 2023-05-24
Payer: MEDICARE

## 2023-05-24 LAB
ANION GAP SERPL CALCULATED.3IONS-SCNC: 12 MMOL/L (ref 5–15)
ANION GAP SERPL CALCULATED.3IONS-SCNC: 13 MMOL/L (ref 5–15)
BUN SERPL-MCNC: 26 MG/DL (ref 6–20)
BUN SERPL-MCNC: 32 MG/DL (ref 6–20)
BUN/CREAT SERPL: 30.6 (ref 7–25)
BUN/CREAT SERPL: 32.3 (ref 7–25)
CALCIUM SPEC-SCNC: 8.7 MG/DL (ref 8.6–10.5)
CALCIUM SPEC-SCNC: 8.8 MG/DL (ref 8.6–10.5)
CHLORIDE SERPL-SCNC: 101 MMOL/L (ref 98–107)
CHLORIDE SERPL-SCNC: 102 MMOL/L (ref 98–107)
CO2 SERPL-SCNC: 26 MMOL/L (ref 22–29)
CO2 SERPL-SCNC: 28 MMOL/L (ref 22–29)
CREAT SERPL-MCNC: 0.85 MG/DL (ref 0.57–1)
CREAT SERPL-MCNC: 0.99 MG/DL (ref 0.57–1)
DEPRECATED RDW RBC AUTO: 52.1 FL (ref 37–54)
DEPRECATED RDW RBC AUTO: 54.3 FL (ref 37–54)
EGFRCR SERPLBLD CKD-EPI 2021: 70.9 ML/MIN/1.73
EGFRCR SERPLBLD CKD-EPI 2021: 85.2 ML/MIN/1.73
ERYTHROCYTE [DISTWIDTH] IN BLOOD BY AUTOMATED COUNT: 17.8 % (ref 12.3–15.4)
ERYTHROCYTE [DISTWIDTH] IN BLOOD BY AUTOMATED COUNT: 17.8 % (ref 12.3–15.4)
GLUCOSE BLDC GLUCOMTR-MCNC: 112 MG/DL (ref 70–105)
GLUCOSE BLDC GLUCOMTR-MCNC: 205 MG/DL (ref 70–105)
GLUCOSE BLDC GLUCOMTR-MCNC: 210 MG/DL (ref 70–105)
GLUCOSE BLDC GLUCOMTR-MCNC: 289 MG/DL (ref 70–105)
GLUCOSE SERPL-MCNC: 207 MG/DL (ref 65–99)
GLUCOSE SERPL-MCNC: 235 MG/DL (ref 65–99)
HCT VFR BLD AUTO: 22.8 % (ref 34–46.6)
HCT VFR BLD AUTO: 26.4 % (ref 34–46.6)
HGB BLD-MCNC: 7.6 G/DL (ref 12–15.9)
HGB BLD-MCNC: 9 G/DL (ref 12–15.9)
MCH RBC QN AUTO: 27.6 PG (ref 26.6–33)
MCH RBC QN AUTO: 28.1 PG (ref 26.6–33)
MCHC RBC AUTO-ENTMCNC: 33.2 G/DL (ref 31.5–35.7)
MCHC RBC AUTO-ENTMCNC: 34.1 G/DL (ref 31.5–35.7)
MCV RBC AUTO: 82.4 FL (ref 79–97)
MCV RBC AUTO: 83.2 FL (ref 79–97)
PLATELET # BLD AUTO: 7 10*3/MM3 (ref 140–450)
PLATELET # BLD AUTO: 8 10*3/MM3 (ref 140–450)
PMV BLD AUTO: 8.1 FL (ref 6–12)
POTASSIUM SERPL-SCNC: 4.4 MMOL/L (ref 3.5–5.2)
POTASSIUM SERPL-SCNC: 4.5 MMOL/L (ref 3.5–5.2)
RBC # BLD AUTO: 2.74 10*6/MM3 (ref 3.77–5.28)
RBC # BLD AUTO: 3.21 10*6/MM3 (ref 3.77–5.28)
SODIUM SERPL-SCNC: 140 MMOL/L (ref 136–145)
SODIUM SERPL-SCNC: 142 MMOL/L (ref 136–145)
WBC NRBC COR # BLD: 1.7 10*3/MM3 (ref 3.4–10.8)
WBC NRBC COR # BLD: 2.1 10*3/MM3 (ref 3.4–10.8)

## 2023-05-24 PROCEDURE — 99232 SBSQ HOSP IP/OBS MODERATE 35: CPT | Performed by: INTERNAL MEDICINE

## 2023-05-24 PROCEDURE — 25010000002 ONDANSETRON PER 1 MG: Performed by: NURSE PRACTITIONER

## 2023-05-24 PROCEDURE — 85027 COMPLETE CBC AUTOMATED: CPT | Performed by: INTERNAL MEDICINE

## 2023-05-24 PROCEDURE — 80048 BASIC METABOLIC PNL TOTAL CA: CPT | Performed by: INTERNAL MEDICINE

## 2023-05-24 PROCEDURE — 63710000001 INSULIN LISPRO (HUMAN) PER 5 UNITS

## 2023-05-24 PROCEDURE — 94799 UNLISTED PULMONARY SVC/PX: CPT

## 2023-05-24 PROCEDURE — 63710000001 INSULIN GLARGINE PER 5 UNITS

## 2023-05-24 PROCEDURE — 82948 REAGENT STRIP/BLOOD GLUCOSE: CPT

## 2023-05-24 PROCEDURE — 94761 N-INVAS EAR/PLS OXIMETRY MLT: CPT

## 2023-05-24 RX ORDER — OXYCODONE HYDROCHLORIDE 5 MG/1
10 TABLET ORAL EVERY 4 HOURS PRN
Status: DISCONTINUED | OUTPATIENT
Start: 2023-05-24 | End: 2023-05-24

## 2023-05-24 RX ORDER — MIDODRINE HYDROCHLORIDE 5 MG/1
10 TABLET ORAL EVERY 8 HOURS
Status: DISCONTINUED | OUTPATIENT
Start: 2023-05-24 | End: 2023-05-31 | Stop reason: HOSPADM

## 2023-05-24 RX ORDER — OXYCODONE HYDROCHLORIDE 5 MG/1
5 TABLET ORAL EVERY 4 HOURS PRN
Status: DISPENSED | OUTPATIENT
Start: 2023-05-24 | End: 2023-05-29

## 2023-05-24 RX ADMIN — GABAPENTIN 300 MG: 300 CAPSULE ORAL at 08:37

## 2023-05-24 RX ADMIN — CITALOPRAM HYDROBROMIDE 10 MG: 20 TABLET ORAL at 08:37

## 2023-05-24 RX ADMIN — OXYCODONE 5 MG: 5 TABLET ORAL at 04:09

## 2023-05-24 RX ADMIN — MIDODRINE HYDROCHLORIDE 2.5 MG: 2.5 TABLET ORAL at 03:55

## 2023-05-24 RX ADMIN — OXYCODONE HYDROCHLORIDE 10 MG: 5 TABLET ORAL at 13:26

## 2023-05-24 RX ADMIN — MIDODRINE HYDROCHLORIDE 10 MG: 5 TABLET ORAL at 22:17

## 2023-05-24 RX ADMIN — Medication 10 ML: at 21:12

## 2023-05-24 RX ADMIN — GABAPENTIN 300 MG: 300 CAPSULE ORAL at 17:28

## 2023-05-24 RX ADMIN — METOPROLOL SUCCINATE 25 MG: 25 TABLET, EXTENDED RELEASE ORAL at 08:36

## 2023-05-24 RX ADMIN — OXYCODONE HYDROCHLORIDE 5 MG: 5 TABLET ORAL at 09:16

## 2023-05-24 RX ADMIN — LEVOFLOXACIN 500 MG: 500 TABLET, FILM COATED ORAL at 08:37

## 2023-05-24 RX ADMIN — MIDODRINE HYDROCHLORIDE 2.5 MG: 2.5 TABLET ORAL at 21:10

## 2023-05-24 RX ADMIN — GABAPENTIN 300 MG: 300 CAPSULE ORAL at 21:10

## 2023-05-24 RX ADMIN — BISACODYL 5 MG: 5 TABLET, COATED ORAL at 04:41

## 2023-05-24 RX ADMIN — SENNOSIDES AND DOCUSATE SODIUM 2 TABLET: 50; 8.6 TABLET ORAL at 21:11

## 2023-05-24 RX ADMIN — FUROSEMIDE 40 MG: 40 TABLET ORAL at 08:37

## 2023-05-24 RX ADMIN — OXYCODONE 5 MG: 5 TABLET ORAL at 08:36

## 2023-05-24 RX ADMIN — INSULIN GLARGINE 23 UNITS: 100 INJECTION, SOLUTION SUBCUTANEOUS at 21:10

## 2023-05-24 RX ADMIN — ALPRAZOLAM 0.5 MG: 0.5 TABLET ORAL at 21:10

## 2023-05-24 RX ADMIN — INSULIN LISPRO 3 UNITS: 100 INJECTION, SOLUTION INTRAVENOUS; SUBCUTANEOUS at 08:43

## 2023-05-24 RX ADMIN — INSULIN LISPRO 2 UNITS: 100 INJECTION, SOLUTION INTRAVENOUS; SUBCUTANEOUS at 12:05

## 2023-05-24 RX ADMIN — INSULIN LISPRO 6 UNITS: 100 INJECTION, SOLUTION INTRAVENOUS; SUBCUTANEOUS at 17:29

## 2023-05-24 RX ADMIN — ALLOPURINOL 500 MG: 300 TABLET ORAL at 08:36

## 2023-05-24 RX ADMIN — OXYCODONE HYDROCHLORIDE 10 MG: 5 TABLET ORAL at 17:28

## 2023-05-24 RX ADMIN — ONDANSETRON 4 MG: 2 INJECTION INTRAMUSCULAR; INTRAVENOUS at 04:09

## 2023-05-24 RX ADMIN — SODIUM CHLORIDE 1000 ML: 9 INJECTION, SOLUTION INTRAVENOUS at 22:32

## 2023-05-24 RX ADMIN — EMPAGLIFLOZIN 25 MG: 25 TABLET, FILM COATED ORAL at 08:37

## 2023-05-24 RX ADMIN — MIRTAZAPINE 15 MG: 15 TABLET, FILM COATED ORAL at 21:10

## 2023-05-24 RX ADMIN — LOSARTAN POTASSIUM 25 MG: 25 TABLET, FILM COATED ORAL at 08:37

## 2023-05-24 RX ADMIN — MIDODRINE HYDROCHLORIDE 2.5 MG: 2.5 TABLET ORAL at 12:02

## 2023-05-24 RX ADMIN — SENNOSIDES AND DOCUSATE SODIUM 2 TABLET: 50; 8.6 TABLET ORAL at 08:36

## 2023-05-24 RX ADMIN — Medication 10 ML: at 08:38

## 2023-05-24 NOTE — PROGRESS NOTES
[facility]     Progress Note    Patient Name: Nieves Mc  : 1975  MRN: [id number]  Primary Care Physician:  Alida Latham APRN  Date of admission: 2023  Service date and time: 23 17:09 EDT  Subjective   Subjective     Chief Complaint: low platelets, platelet transfusion reaction     HPI:47 y.o. female with PMH of CML followed outpatient by Dr. Lerma. She was seen at the cancer center today 2023 after recent missed visits. Labwork showed pancytopenia with WBC 2.6, Hgb 8.6, platelets 7.  Patient was sent to ambulatory care for 2 units of platelets. She apparently had a reaction where she became nauseous without vomiting and started coughing and had chills. The transfusion was stopped and she was given decadron and benadryl. Her repeat platelets were 8 so it was decided to send her to the ED for hospital admission and platelet transfusion. The patient reports her petechiae on her upper chest and lower legs and inside her lips started today. She denied any shortness of breath. She has no further cough or nausea. She has been fatigued lately. She fell 2 or 3 weeks ago and hit the right side of her head. She denied any vision changes or current headache. She has been ambulating at home      Patient is having diffuse pain today, in  depressed mood.      Objective   Objective     Vitals:   Temp:  [98 °F (36.7 °C)-99.3 °F (37.4 °C)] 98.5 °F (36.9 °C)  Heart Rate:  [104-117] 104  Resp:  [16-21] 16  BP: ()/(44-68) 90/66  Physical Exam    Constitutional: Awake, alert   Eyes: PERRLA, sclerae anicteric, no conjunctival injection   HENT: NCAT, mucous membranes moist   Neck: Supple, no thyromegaly, no lymphadenopathy, trachea midline   Respiratory: Clear to auscultation bilaterally, nonlabored respirations    Cardiovascular: RRR, no murmurs, rubs, or gallops, palpable pedal pulses bilaterally   Gastrointestinal: Positive bowel sounds, soft, nontender, nondistended   Musculoskeletal:  No bilateral ankle edema, no clubbing or cyanosis to extremities   Psychiatric: Depressed   Neurologic: Oriented x 3, strength symmetric in all extremities, Cranial Nerves grossly intact to confrontation, speech clear   Skin: No rashes     Result Review    Result Review:  I have personally reviewed the results from the time of this admission to 5/24/2023 17:09 EDT and agree with these findings:  []  Laboratory list / accordion  []  Microbiology  []  Radiology  []  EKG/Telemetry   []  Cardiology/Vascular   []  Pathology  []  Old records  []  Other:  Most notable findings include: WBC count of 1.7, platelet count of 7    Assessment & Plan   Assessment / Plan       Active Hospital Problems:  Active Hospital Problems    Diagnosis    • **Pancytopenia    • Bilateral subdural hematomas    • Lumbar radiculopathy    • Traumatic subdural hemorrhage without loss of consciousness    • Chronic pain    • NICM (nonischemic cardiomyopathy)    • Type 2 diabetes mellitus with diabetic polyneuropathy, with long-term current use of insulin    • Depression with anxiety    • History of pulmonary embolism    • Medically noncompliant    • CML (chronic myelocytic leukemia)      Plan:    Pancytopenia    -received 1 unit of platelets in ambulatory care and developed a cough, chills, and nausea. Transfusion was stopped and patient was given IV decadron and bendadryl and sent to the ER  -plts now 8  -spoke with oncology who recommended pretreatment with IV solumedrol, IV benadryl, and tylenol prior to another unit of platelets  -start levaquin 500mg daily x7 days for prophylaxis per oncology notes  -neutropenic precautions  -oncology consulted      Remote subdural hemorrhage  -CT head: bilateral subdural hygromas suggesting remote subdural hemorrhage.  There is local mass effect with sulcal and effacement and decreased of the size of lateral ventricles but no evidence of herniation.  No acute intracranial hemorrhage.  -pt reports a fall 2-3  weeks ago per patient  -no neurological deficits on exam  -continue neuro checks   -consult neurosurgery   -repeat CT yesterday showed no change     CML  -per oncology notes no longer on Gleevec or hydroxyurea currently on Ponatinib 15mg followed      DM type 2  -glucose 375 at 11am  -check glucose AC/HS  -cont home lantus, premeal insulin, and SSI     Nonischemic Cardiomyopathy EF 44%  -cont home GDMT: BB, ARB, SGTL2, lasix     Chronic pain  -pain pump in place, managed by pain management outpatient. Pt just had pump adjusted yesterday      Anxiety  -cont home xanax        Right upper and lower extremity weakness: will order a ct scan of the brain with no contrast         DVT prophylaxis: contraindicated due to plts of 8    DVT prophylaxis:  Mechanical DVT prophylaxis orders are present.    CODE STATUS:   Medical Intervention Limits: NO intubation (DNI); NO artificial nutrition  Level Of Support Discussed With: Patient; Next of Kin (If No Surrogate)  Code Status (Patient has no pulse and is not breathing): CPR (Attempt to Resuscitate)  Medical Interventions (Patient has pulse or is breathing): Limited Support  Release to patient: Routine Release    Disposition:  I expect patient to be discharged in 3 days     Waqas Oneal MD

## 2023-05-24 NOTE — PROGRESS NOTES
Palliative Care Social Work Progress Note    Code Status:full code    Goals of Care: Full Treatment    Narrative: Palliative care  met with pt to assess for any new needs or concerns.   Pt complaining of pain at time of contact and acknowledged that pain medication dosage had been changed today.  Emotional support provided.  Notified RN of pt's request for pain medications.               Plan:  -Will continue to follow.          BLANCHE Stovall

## 2023-05-24 NOTE — PLAN OF CARE
Goal Outcome Evaluation:              Outcome Evaluation: patient complains about ache and fuzziness in legs and back, pain medications given po as needed. patinet alert and oriented x4. patient given scheduled midodrine for low blooodpressure. encourage patient to get up and set in chair, patient veblaized understanding refused because of pain. Instructed patient on importance notifying staff when she does want to get up.

## 2023-05-25 ENCOUNTER — TELEPHONE (OUTPATIENT)
Dept: ONCOLOGY | Facility: CLINIC | Age: 48
End: 2023-05-25
Payer: MEDICARE

## 2023-05-25 ENCOUNTER — APPOINTMENT (OUTPATIENT)
Dept: GENERAL RADIOLOGY | Facility: HOSPITAL | Age: 48
DRG: 809 | End: 2023-05-25
Payer: MEDICARE

## 2023-05-25 LAB
ANISOCYTOSIS BLD QL: ABNORMAL
DEPRECATED RDW RBC AUTO: 51.6 FL (ref 37–54)
ERYTHROCYTE [DISTWIDTH] IN BLOOD BY AUTOMATED COUNT: 17.7 % (ref 12.3–15.4)
GLUCOSE BLDC GLUCOMTR-MCNC: 118 MG/DL (ref 70–105)
GLUCOSE BLDC GLUCOMTR-MCNC: 145 MG/DL (ref 70–105)
GLUCOSE BLDC GLUCOMTR-MCNC: 203 MG/DL (ref 70–105)
GLUCOSE BLDC GLUCOMTR-MCNC: 226 MG/DL (ref 70–105)
HCT VFR BLD AUTO: 25.4 % (ref 34–46.6)
HGB BLD-MCNC: 8.3 G/DL (ref 12–15.9)
INTERPRETATION: POSITIVE
LAB DIRECTOR NAME PROVIDER: NORMAL
LABORATORY COMMENT REPORT: NORMAL
LYMPHOCYTES # BLD MANUAL: 2.3 10*3/MM3 (ref 0.7–3.1)
LYMPHOCYTES NFR BLD MANUAL: 2 % (ref 5–12)
MCH RBC QN AUTO: 27.4 PG (ref 26.6–33)
MCHC RBC AUTO-ENTMCNC: 32.8 G/DL (ref 31.5–35.7)
MCV RBC AUTO: 83.5 FL (ref 79–97)
MONOCYTES # BLD: 0.05 10*3/MM3 (ref 0.1–0.9)
NEUTROPHILS # BLD AUTO: 0.05 10*3/MM3 (ref 1.7–7)
NEUTROPHILS NFR BLD MANUAL: 2 % (ref 42.7–76)
NRBC SPEC MANUAL: 1 /100 WBC (ref 0–0.2)
PLATELET # BLD AUTO: 10 10*3/MM3 (ref 140–450)
PMV BLD AUTO: 7.6 FL (ref 6–12)
RBC # BLD AUTO: 3.05 10*6/MM3 (ref 3.77–5.28)
REF LAB TEST METHOD: NORMAL
SCAN SLIDE: NORMAL
SMALL PLATELETS BLD QL SMEAR: ABNORMAL
T(ABL1,BCR)B2A2/CONTROL BLD/T: 1.68 %
T(ABL1,BCR)B3A2/CONTROL BLD/T: 6.08 %
T(ABL1,BCR)E1A2/CONTROL BLD/T: NORMAL %
VARIANT LYMPHS NFR BLD MANUAL: 96 % (ref 19.6–45.3)
WBC MORPH BLD: NORMAL
WBC NRBC COR # BLD: 2.4 10*3/MM3 (ref 3.4–10.8)

## 2023-05-25 PROCEDURE — 85007 BL SMEAR W/DIFF WBC COUNT: CPT | Performed by: INTERNAL MEDICINE

## 2023-05-25 PROCEDURE — 63710000001 INSULIN GLARGINE PER 5 UNITS

## 2023-05-25 PROCEDURE — 82948 REAGENT STRIP/BLOOD GLUCOSE: CPT

## 2023-05-25 PROCEDURE — 63710000001 INSULIN LISPRO (HUMAN) PER 5 UNITS

## 2023-05-25 PROCEDURE — 99231 SBSQ HOSP IP/OBS SF/LOW 25: CPT | Performed by: INTERNAL MEDICINE

## 2023-05-25 PROCEDURE — 97116 GAIT TRAINING THERAPY: CPT

## 2023-05-25 PROCEDURE — 85025 COMPLETE CBC W/AUTO DIFF WBC: CPT | Performed by: INTERNAL MEDICINE

## 2023-05-25 PROCEDURE — 71110 X-RAY EXAM RIBS BIL 3 VIEWS: CPT

## 2023-05-25 RX ADMIN — LOSARTAN POTASSIUM 25 MG: 25 TABLET, FILM COATED ORAL at 09:34

## 2023-05-25 RX ADMIN — OXYCODONE 5 MG: 5 TABLET ORAL at 14:08

## 2023-05-25 RX ADMIN — MIRTAZAPINE 15 MG: 15 TABLET, FILM COATED ORAL at 20:48

## 2023-05-25 RX ADMIN — Medication 10 ML: at 09:36

## 2023-05-25 RX ADMIN — SENNOSIDES AND DOCUSATE SODIUM 2 TABLET: 50; 8.6 TABLET ORAL at 20:48

## 2023-05-25 RX ADMIN — GABAPENTIN 300 MG: 300 CAPSULE ORAL at 20:48

## 2023-05-25 RX ADMIN — GABAPENTIN 300 MG: 300 CAPSULE ORAL at 17:17

## 2023-05-25 RX ADMIN — ALLOPURINOL 500 MG: 300 TABLET ORAL at 09:34

## 2023-05-25 RX ADMIN — EMPAGLIFLOZIN 25 MG: 25 TABLET, FILM COATED ORAL at 09:34

## 2023-05-25 RX ADMIN — CITALOPRAM HYDROBROMIDE 10 MG: 20 TABLET ORAL at 09:34

## 2023-05-25 RX ADMIN — INSULIN LISPRO 7 UNITS: 100 INJECTION, SOLUTION INTRAVENOUS; SUBCUTANEOUS at 18:03

## 2023-05-25 RX ADMIN — Medication 10 ML: at 20:49

## 2023-05-25 RX ADMIN — GABAPENTIN 300 MG: 300 CAPSULE ORAL at 09:34

## 2023-05-25 RX ADMIN — OXYCODONE 5 MG: 5 TABLET ORAL at 06:18

## 2023-05-25 RX ADMIN — ALPRAZOLAM 0.5 MG: 0.5 TABLET ORAL at 20:48

## 2023-05-25 RX ADMIN — INSULIN GLARGINE 21 UNITS: 100 INJECTION, SOLUTION SUBCUTANEOUS at 20:48

## 2023-05-25 RX ADMIN — MIDODRINE HYDROCHLORIDE 10 MG: 5 TABLET ORAL at 23:06

## 2023-05-25 RX ADMIN — MIDODRINE HYDROCHLORIDE 10 MG: 5 TABLET ORAL at 06:18

## 2023-05-25 RX ADMIN — OXYCODONE 5 MG: 5 TABLET ORAL at 20:48

## 2023-05-25 RX ADMIN — MIDODRINE HYDROCHLORIDE 10 MG: 5 TABLET ORAL at 17:17

## 2023-05-25 RX ADMIN — SENNOSIDES AND DOCUSATE SODIUM 2 TABLET: 50; 8.6 TABLET ORAL at 09:34

## 2023-05-25 RX ADMIN — METOPROLOL SUCCINATE 25 MG: 25 TABLET, EXTENDED RELEASE ORAL at 09:36

## 2023-05-25 NOTE — THERAPY TREATMENT NOTE
"Subjective: Pt agreeable to therapeutic plan of care. Pt states her LLE is sore from bumping it on the bed earlier today.    Objective:     Bed mobility - Modified-Independent  Transfers - Supervision  Ambulation - 30 feet CGA with RW, holding LLE in Hyperextension.  Pt states that she is unable to walk any further as she is fearful her legs will buckle.       Vitals: WNL    Pain: 8 VAS   Location: generalized, chronic pain as well as LLE pain.   Intervention for pain: Repositioned, RN notified and Increased Activity; pt already using warming pack     Education: Transfer Training and Gait Training; Pt describes episodes prior to admission with LLE buckled and 'gave out'. PT encouraged pt to rely on UEs if LLE feels weak. Pt states arms are too weak to support.     Assessment: Hope NICHOLAS Mc presents with functional mobility impairments  Related to Pancytopenia with hx of SDH and CMLwhich indicate the need for skilled intervention. Tolerating session today without incident.   Pt progressing with gait but limited by fatigue and c/o weakness.  Pt verbalizes that she uses RW every time she is up as educated by previous PT. Will continue to follow and progress as tolerated. She would benefit from HH PT at discharge.      Plan/Recommendations:   Low Intensity Therapy recommended post-acute care - This is recommended as therapy feels this patient would require 2-3 visits per week. OP or HH would be the best option depending on patient's home bound status. Consider, if the patient has other  \"skilled\" needs such as wounds, IV antibiotics, etc. Combined with \"low intensity\" could also equate to a SNF. If patient is medically complex, consider LTAC.. Pt requires no DME at discharge.     Pt desires Home with family assist and and Home Health at discharge. Pt cooperative; agreeable to therapeutic recommendations and plan of care.         Basic Mobility 6-click:  Rollin = Total, A lot = 2, A little = 3; 4 = " None  Supine>Sit:   1 = Total, A lot = 2, A little = 3; 4 = None   Sit>Stand with arms:  1 = Total, A lot = 2, A little = 3; 4 = None  Bed>Chair:   1 = Total, A lot = 2, A little = 3; 4 = None  Ambulate in room:  1 = Total, A lot = 2, A little = 3; 4 = None  3-5 Steps with railin = Total, A lot = 2, A little = 3; 4 = None  Score: 19    Modified [place]: N/A = No pre-op stroke/TIA    Post-Tx Position: Supine with HOB Elevated and Call light and personal items within reach  PPE: gloves, surgical mask and gown

## 2023-05-25 NOTE — PROGRESS NOTES
Hematology/Oncology Inpatient Progress Note    PATIENT NAME: Nieves Mc  : 1975  MRN: [id number]    CHIEF COMPLAINT:     HISTORY OF PRESENT ILLNESS:      Nieves Mc is a 47 y.o. female who presented to [facility] on 2023 with complaints of nausea, vomiting, coughing and chills during a platelet transfusion.  Past medical history of CML on ponatinib, cardiomyopathy, chronic pain, uncontrolled type 1 diabetes, chronic anemia, insomnia, anxiety and depression, pulmonary embolism on chronic anticoagulation with Xarelto, medical noncompliance.       Evaluation in the ED: Patient reported a fall 2 to 3 weeks ago during which she hit the right side of her head a CT of the head was done showing bilateral subdural hygromas suggesting remote subdural hemorrhage; local mass effect with sulcal and effacement and decreased of the size of lateral ventricles but no evidence of herniation; no acute intracranial hemorrhage.  The patient was admitted for further treatment of her pancytopenia and remote subdural hemorrhage with plans for pretreatment with IV Solu-Medrol, Tylenol and Benadryl prior to transfusing another unit of platelets.        23  Hematology/Oncology was consulted on a patient known to us and followed in the office by Dr. Lerma for her diagnosis of CML.  Patient initially presented in  when she was seen in consultation while hospitalized.  At that time the patient was on imatinib.  In  she had progressive thrombocytosis and was transition to nilotinib.  After that she was lost to follow-up until she was once again seen during a hospitalization in .  She was continued on nilotinib and hydroxyurea.  In 2022 she had once again been lost to follow-up for 3 months when she presented in the office after not taking nilotinib during that time.  She had shortness of breath and cough for which a 2D echocardiogram was done and showed a reduced EF of 41 to 45%.  Due to  cardiomyopathy she was transitioned to imatinib and hydroxyurea.  In November 2022 bone marrow biopsy showed her to be 18 accelerated phase CML and that the imatinib was not controlling her malignancy well and she was initiated on ponatinib 45 mg p.o. daily.  She had significant body pain with this dose and for that reason was reduced to 30 mg p.o. daily.  She was once again lost to follow-up for approximately 6 weeks during that time she self reduced to ponatinib 15 mg daily.  She was seen in the office on 5/17/2023 at which time her platelet count was noted to be 8000 and she was severely neutropenic.  Her ponatinib was held and she was sent to the ambulatory care unit for 2 units of platelets to be transfused and a platelet level to be checked posttransfusion for further planning.     Patient has a history of CML on ponatinib.  Patient has been noncompliant with lab evaluations as recommended.  She has been taking 50 mg of ponatinib.  She comes to the office with significant pancytopenia, severe neutropenia, platelets of 7000 and anemia.  Patient was sent to the ambulatory care for platelet transfusions, she developed chills nausea but did receive platelets.  Repeat platelet check was no significantly changed at 8000 for that reason patient was sent to the ER to be admitted to the hospital.  She has a history of falls approximately 2 weeks earlier.  While in the ER she had CT of the head which showed old subdural hematoma.  Patient denies nausea vomiting fevers or chills.  She has not been able to come to the office due to social issues, transportation issues     He/She  has a past medical history of Bone pain, COVID-19 virus infection (01/12/2022), Diabetes mellitus, Extremity pain, Leg pain, Migraine, Pulmonary embolism, and Vision loss.     PCP: Alida Latham APRN    Subjective    5/24/2023: Very uncomfortable this morning. Reports pain in the lower extremities that starts in the lower back and that  is made more severe by standing or attempting to walk. It is a burning type pain, similar to what she had in the past but more severe.  The analgesic she has been receiving has not helped much.     ROS:    Review of Systems   Constitutional: Positive for activity change, appetite change, fatigue and unexpected weight change. Negative for chills, diaphoresis and fever.   HENT: Negative for congestion, dental problem, drooling, ear discharge, ear pain, facial swelling, hearing loss, mouth sores, nosebleeds, postnasal drip, rhinorrhea, sinus pressure, sinus pain, sneezing, sore throat, tinnitus, trouble swallowing and voice change.    Eyes: Negative for photophobia, pain, discharge, redness, itching and visual disturbance.   Respiratory: Negative for apnea, cough, choking, chest tightness, shortness of breath, wheezing and stridor.    Cardiovascular: Negative for chest pain, palpitations and leg swelling.   Gastrointestinal: Negative for abdominal distention, abdominal pain, anal bleeding, blood in stool, constipation, diarrhea, nausea, rectal pain and vomiting.   Endocrine: Negative for cold intolerance, heat intolerance, polydipsia and polyuria.   Genitourinary: Negative for decreased urine volume, difficulty urinating, dysuria, flank pain, frequency, genital sores, hematuria and urgency.   Musculoskeletal: Positive for back pain and myalgias. Negative for arthralgias, gait problem, joint swelling, neck pain and neck stiffness.   Skin: Negative for color change, pallor and rash.   Neurological: Positive for weakness. Negative for dizziness, tremors, seizures, syncope, facial asymmetry, speech difficulty, light-headedness, numbness and headaches.   Hematological: Negative for adenopathy. Does not bruise/bleed easily.   Psychiatric/Behavioral: Negative for agitation, behavioral problems, confusion, decreased concentration, hallucinations, self-injury, sleep disturbance and suicidal ideas. The patient is not  "nervous/anxious.       MEDICATIONS:    Scheduled Meds:  allopurinol, 500 mg, Oral, Daily  budesonide-formoterol, 2 puff, Inhalation, BID - RT  citalopram, 10 mg, Oral, Daily  empagliflozin, 25 mg, Oral, Daily  furosemide, 40 mg, Oral, Daily  gabapentin, 300 mg, Oral, TID  insulin glargine, 1-200 Units, Subcutaneous, Nightly - Glucommander  insulin lispro, 1-200 Units, Subcutaneous, 4x Daily With Meals & Nightly  losartan, 25 mg, Oral, Daily  metoprolol succinate XL, 25 mg, Oral, Daily  midodrine, 10 mg, Oral, Q8H  mirtazapine, 15 mg, Oral, Nightly  senna-docusate sodium, 2 tablet, Oral, BID  sodium chloride, 10 mL, Intravenous, Q12H       Continuous Infusions:  pain,        PRN Meds:  •  acetaminophen **OR** acetaminophen **OR** acetaminophen  •  ALPRAZolam  •  senna-docusate sodium **AND** polyethylene glycol **AND** bisacodyl **AND** bisacodyl  •  Calcium Replacement - Follow Nurse / BPA Driven Protocol  •  dextrose  •  dextrose  •  glucagon (human recombinant)  •  hydrocortisone  •  hydrOXYzine  •  insulin lispro  •  Magnesium Standard Dose Replacement - Follow Nurse / BPA Driven Protocol  •  melatonin  •  ondansetron **OR** ondansetron  •  oxyCODONE  •  Phosphorus Replacement - Follow Nurse / BPA Driven Protocol  •  Potassium Replacement - Follow Nurse / BPA Driven Protocol  •  prochlorperazine  •  sodium chloride  •  sodium chloride     ALLERGIES:  No Known Allergies    Objective    VITALS:   /63 (BP Location: Right arm, Patient Position: Lying)   Pulse 94   Temp 99.1 °F (37.3 °C) (Oral)   Resp 14   Ht 162.6 cm (64\")   Wt 57.9 kg (127 lb 10.3 oz)   LMP 05/25/2022 (Approximate)   SpO2 96%   BMI 21.91 kg/m²     PHYSICAL EXAM: (performed by MD)  Physical Exam  Constitutional:       General: She is not in acute distress.     Appearance: Normal appearance. She is ill-appearing. She is not toxic-appearing or diaphoretic.   HENT:      Head: Normocephalic and atraumatic.      Right Ear: External ear " normal.      Left Ear: External ear normal.      Nose: Nose normal.      Mouth/Throat:      Mouth: Mucous membranes are moist.      Pharynx: Oropharynx is clear. No oropharyngeal exudate or posterior oropharyngeal erythema.   Eyes:      General: No scleral icterus.        Right eye: No discharge.         Left eye: No discharge.      Conjunctiva/sclera: Conjunctivae normal.      Pupils: Pupils are equal, round, and reactive to light.   Cardiovascular:      Rate and Rhythm: Normal rate and regular rhythm.      Pulses: Normal pulses.      Heart sounds: No murmur heard.    No friction rub. No gallop.   Pulmonary:      Effort: Pulmonary effort is normal. No respiratory distress.      Breath sounds: No stridor. No wheezing, rhonchi or rales.   Abdominal:      General: Abdomen is flat. Bowel sounds are normal. There is no distension.      Palpations: Abdomen is soft. There is no mass.      Tenderness: There is no abdominal tenderness. There is no right CVA tenderness, left CVA tenderness, guarding or rebound.      Hernia: No hernia is present.   Musculoskeletal:         General: No swelling, tenderness, deformity or signs of injury.      Cervical back: No rigidity.      Right lower leg: No edema.      Left lower leg: No edema.   Lymphadenopathy:      Cervical: No cervical adenopathy.   Skin:     Coloration: Skin is pale. Skin is not jaundiced.      Findings: No bruising, lesion or rash.   Neurological:      General: No focal deficit present.      Mental Status: She is alert and oriented to person, place, and time. Mental status is at baseline.      Cranial Nerves: No cranial nerve deficit.      Motor: No weakness.      Gait: Gait normal.   Psychiatric:         Mood and Affect: Mood normal.         Behavior: Behavior normal.         Thought Content: Thought content normal.         Judgment: Judgment normal.     JAMEL Truong MD performed the physical exam on 5/24/2023 as documented above.     RECENT LABS:  Lab Results  (last 24 hours)     Procedure Component Value Units Date/Time    Basic Metabolic Panel [[id number]]  (Abnormal) Collected: 05/24/23 2248    Specimen: Blood Updated: 05/24/23 2348     Glucose 207 mg/dL      BUN 32 mg/dL      Creatinine 0.99 mg/dL      Sodium 140 mmol/L      Potassium 4.5 mmol/L      Chloride 101 mmol/L      CO2 26.0 mmol/L      Calcium 8.7 mg/dL      BUN/Creatinine Ratio 32.3     Anion Gap 13.0 mmol/L      eGFR 70.9 mL/min/1.73     Narrative:      GFR Normal >60  Chronic Kidney Disease <60  Kidney Failure <15      CBC (No Diff) [[id number]]  (Abnormal) Collected: 05/24/23 2248    Specimen: Blood Updated: 05/24/23 2346     WBC 2.10 10*3/mm3      RBC 2.74 10*6/mm3      Hemoglobin 7.6 g/dL      Hematocrit 22.8 %      MCV 83.2 fL      MCH 27.6 pg      MCHC 33.2 g/dL      RDW 17.8 %      RDW-SD 52.1 fl      Platelets 8 10*3/mm3     POC Glucose Once [[id number]]  (Abnormal) Collected: 05/24/23 2052    Specimen: Blood Updated: 05/24/23 2057     Glucose 112 mg/dL      Comment: Serial Number: [id number]:  [id number]       POC Glucose Once [[id number]]  (Abnormal) Collected: 05/24/23 1724    Specimen: Blood Updated: 05/24/23 1725     Glucose 210 mg/dL      Comment: Serial Number: [id number]:  [id number]       POC Glucose Once [[id number]]  (Abnormal) Collected: 05/24/23 1108    Specimen: Blood Updated: 05/24/23 1109     Glucose 205 mg/dL      Comment: Serial Number: [id number]:  [id number]       POC Glucose Once [[id number]]  (Abnormal) Collected: 05/24/23 0728    Specimen: Blood Updated: 05/24/23 0729     Glucose 289 mg/dL      Comment: Serial Number: [id number]:  [id number]           IMAGING REVIEWED:  CT Head Without Contrast    Result Date: 5/23/2023  Impression: Chronic bilateral subdural hygromas, right greater than left, unchanged from 5/18/2023. No acute intracranial finding. Electronically Signed: Heather Kelsey  5/23/2023 3:11 PM EDT  Workstation ID: [id number]      Assessment & Plan       ASSESSMENT:    1. Pancytopenia: There is progressive improvement in the blood counts though very slowly.  2. Accelerated phase CML: Off of the ponatininb now.   3. Alloimmunization: No need for transfusion.    4. Bilateral subdural hematomas: No suggestion of further bleeding.   5. Back pain. Increased the dose of the oxycodone.      PLANS    1. As above.      Yifan Truong MD on 5/24/2023 at 8:00 AM.

## 2023-05-25 NOTE — PROGRESS NOTES
Hematology/Oncology Inpatient Progress Note    PATIENT NAME: Nieves Mc  : 1975  MRN: [id number]    CHIEF COMPLAINT:     HISTORY OF PRESENT ILLNESS:      Nieves Mc is a 47 y.o. female who presented to [facility] on 2023 with complaints of nausea, vomiting, coughing and chills during a platelet transfusion.  Past medical history of CML on ponatinib, cardiomyopathy, chronic pain, uncontrolled type 1 diabetes, chronic anemia, insomnia, anxiety and depression, pulmonary embolism on chronic anticoagulation with Xarelto, medical noncompliance.       Evaluation in the ED: Patient reported a fall 2 to 3 weeks ago during which she hit the right side of her head a CT of the head was done showing bilateral subdural hygromas suggesting remote subdural hemorrhage; local mass effect with sulcal and effacement and decreased of the size of lateral ventricles but no evidence of herniation; no acute intracranial hemorrhage.  The patient was admitted for further treatment of her pancytopenia and remote subdural hemorrhage with plans for pretreatment with IV Solu-Medrol, Tylenol and Benadryl prior to transfusing another unit of platelets.        23  Hematology/Oncology was consulted on a patient known to us and followed in the office by Dr. Lerma for her diagnosis of CML.  Patient initially presented in  when she was seen in consultation while hospitalized.  At that time the patient was on imatinib.  In  she had progressive thrombocytosis and was transition to nilotinib.  After that she was lost to follow-up until she was once again seen during a hospitalization in .  She was continued on nilotinib and hydroxyurea.  In 2022 she had once again been lost to follow-up for 3 months when she presented in the office after not taking nilotinib during that time.  She had shortness of breath and cough for which a 2D echocardiogram was done and showed a reduced EF of 41 to 45%.  Due to  cardiomyopathy she was transitioned to imatinib and hydroxyurea.  In November 2022 bone marrow biopsy showed her to be 18 accelerated phase CML and that the imatinib was not controlling her malignancy well and she was initiated on ponatinib 45 mg p.o. daily.  She had significant body pain with this dose and for that reason was reduced to 30 mg p.o. daily.  She was once again lost to follow-up for approximately 6 weeks during that time she self reduced to ponatinib 15 mg daily.  She was seen in the office on 5/17/2023 at which time her platelet count was noted to be 8000 and she was severely neutropenic.  Her ponatinib was held and she was sent to the ambulatory care unit for 2 units of platelets to be transfused and a platelet level to be checked posttransfusion for further planning.     Patient has a history of CML on ponatinib.  Patient has been noncompliant with lab evaluations as recommended.  She has been taking 50 mg of ponatinib.  She comes to the office with significant pancytopenia, severe neutropenia, platelets of 7000 and anemia.  Patient was sent to the ambulatory care for platelet transfusions, she developed chills nausea but did receive platelets.  Repeat platelet check was no significantly changed at 8000 for that reason patient was sent to the ER to be admitted to the hospital.  She has a history of falls approximately 2 weeks earlier.  While in the ER she had CT of the head which showed old subdural hematoma.  Patient denies nausea vomiting fevers or chills.  She has not been able to come to the office due to social issues, transportation issues     He/She  has a past medical history of Bone pain, COVID-19 virus infection (01/12/2022), Diabetes mellitus, Extremity pain, Leg pain, Migraine, Pulmonary embolism, and Vision loss.     PCP: Alida Latham APRN    Subjective        5/24/2023: Very uncomfortable this morning. Reports pain in the lower extremities that starts in the lower back and  that is made more severe by standing or attempting to walk. It is a burning type pain, similar to what she had in the past but more severe.  The analgesic she has been receiving has not helped much.       Patient does not have any new issues    ROS:    Review of Systems   Constitutional: Positive for activity change, appetite change, fatigue and unexpected weight change. Negative for chills, diaphoresis and fever.   HENT: Negative for congestion, dental problem, drooling, ear discharge, ear pain, facial swelling, hearing loss, mouth sores, nosebleeds, postnasal drip, rhinorrhea, sinus pressure, sinus pain, sneezing, sore throat, tinnitus, trouble swallowing and voice change.    Eyes: Negative for photophobia, pain, discharge, redness, itching and visual disturbance.   Respiratory: Negative for apnea, cough, choking, chest tightness, shortness of breath, wheezing and stridor.    Cardiovascular: Negative for chest pain, palpitations and leg swelling.   Gastrointestinal: Negative for abdominal distention, abdominal pain, anal bleeding, blood in stool, constipation, diarrhea, nausea, rectal pain and vomiting.   Endocrine: Negative for cold intolerance, heat intolerance, polydipsia and polyuria.   Genitourinary: Negative for decreased urine volume, difficulty urinating, dysuria, flank pain, frequency, genital sores, hematuria and urgency.   Musculoskeletal: Positive for back pain and myalgias. Negative for arthralgias, gait problem, joint swelling, neck pain and neck stiffness.   Skin: Negative for color change, pallor and rash.   Neurological: Positive for weakness. Negative for dizziness, tremors, seizures, syncope, facial asymmetry, speech difficulty, light-headedness, numbness and headaches.   Hematological: Negative for adenopathy. Does not bruise/bleed easily.   Psychiatric/Behavioral: Negative for agitation, behavioral problems, confusion, decreased concentration, hallucinations, self-injury, sleep disturbance and  "suicidal ideas. The patient is not nervous/anxious.       MEDICATIONS:    Scheduled Meds:  allopurinol, 500 mg, Oral, Daily  budesonide-formoterol, 2 puff, Inhalation, BID - RT  citalopram, 10 mg, Oral, Daily  empagliflozin, 25 mg, Oral, Daily  furosemide, 40 mg, Oral, Daily  gabapentin, 300 mg, Oral, TID  insulin glargine, 1-200 Units, Subcutaneous, Nightly - Glucommander  insulin lispro, 1-200 Units, Subcutaneous, 4x Daily With Meals & Nightly  losartan, 25 mg, Oral, Daily  metoprolol succinate XL, 25 mg, Oral, Daily  midodrine, 10 mg, Oral, Q8H  mirtazapine, 15 mg, Oral, Nightly  senna-docusate sodium, 2 tablet, Oral, BID  sodium chloride, 10 mL, Intravenous, Q12H       Continuous Infusions:  pain,        PRN Meds:  •  acetaminophen **OR** acetaminophen **OR** acetaminophen  •  ALPRAZolam  •  senna-docusate sodium **AND** polyethylene glycol **AND** bisacodyl **AND** bisacodyl  •  Calcium Replacement - Follow Nurse / BPA Driven Protocol  •  dextrose  •  dextrose  •  glucagon (human recombinant)  •  hydrocortisone  •  hydrOXYzine  •  insulin lispro  •  Magnesium Standard Dose Replacement - Follow Nurse / BPA Driven Protocol  •  melatonin  •  ondansetron **OR** ondansetron  •  oxyCODONE  •  Phosphorus Replacement - Follow Nurse / BPA Driven Protocol  •  Potassium Replacement - Follow Nurse / BPA Driven Protocol  •  prochlorperazine  •  sodium chloride  •  sodium chloride     ALLERGIES:  No Known Allergies    Objective    VITALS:   /76 (BP Location: Right arm, Patient Position: Lying)   Pulse 99   Temp 98.3 °F (36.8 °C) (Oral)   Resp 14   Ht 162.6 cm (64\")   Wt 57.9 kg (127 lb 10.3 oz)   LMP 05/25/2022 (Approximate)   SpO2 95%   BMI 21.91 kg/m²     PHYSICAL EXAM: (performed by MD)  Physical Exam  Constitutional:       General: She is not in acute distress.     Appearance: Normal appearance. She is ill-appearing. She is not toxic-appearing or diaphoretic.   HENT:      Head: Normocephalic and atraumatic. "      Right Ear: External ear normal.      Left Ear: External ear normal.      Nose: Nose normal.      Mouth/Throat:      Mouth: Mucous membranes are moist.      Pharynx: Oropharynx is clear. No oropharyngeal exudate or posterior oropharyngeal erythema.   Eyes:      General: No scleral icterus.        Right eye: No discharge.         Left eye: No discharge.      Conjunctiva/sclera: Conjunctivae normal.      Pupils: Pupils are equal, round, and reactive to light.   Cardiovascular:      Rate and Rhythm: Normal rate and regular rhythm.      Pulses: Normal pulses.      Heart sounds: No murmur heard.    No friction rub. No gallop.   Pulmonary:      Effort: Pulmonary effort is normal. No respiratory distress.      Breath sounds: No stridor. No wheezing, rhonchi or rales.   Abdominal:      General: Abdomen is flat. Bowel sounds are normal. There is no distension.      Palpations: Abdomen is soft. There is no mass.      Tenderness: There is no abdominal tenderness. There is no right CVA tenderness, left CVA tenderness, guarding or rebound.      Hernia: No hernia is present.   Musculoskeletal:         General: No swelling, tenderness, deformity or signs of injury.      Cervical back: No rigidity.      Right lower leg: No edema.      Left lower leg: No edema.   Lymphadenopathy:      Cervical: No cervical adenopathy.   Skin:     Coloration: Skin is pale. Skin is not jaundiced.      Findings: No bruising, lesion or rash.   Neurological:      General: No focal deficit present.      Mental Status: She is alert and oriented to person, place, and time. Mental status is at baseline.      Cranial Nerves: No cranial nerve deficit.      Motor: No weakness.      Gait: Gait normal.   Psychiatric:         Mood and Affect: Mood normal.         Behavior: Behavior normal.         Thought Content: Thought content normal.         Judgment: Judgment normal.       I have reexamined the patient and the results are consistent with the previously  documented exam. Meghann Leeann Lerma MD     RECENT LABS:  Lab Results (last 24 hours)     Procedure Component Value Units Date/Time    POC Glucose Once [[id number]]  (Abnormal) Collected: 05/25/23 1627    Specimen: Blood Updated: 05/25/23 1629     Glucose 226 mg/dL      Comment: Serial Number: [id number]:  [id number]       POC Glucose Once [[id number]]  (Abnormal) Collected: 05/25/23 1017    Specimen: Blood Updated: 05/25/23 1023     Glucose 145 mg/dL      Comment: Serial Number: [id number]:  [id number]       Manual Differential [[id number]]  (Abnormal) Collected: 05/25/23 0854    Specimen: Blood Updated: 05/25/23 0954     Neutrophil % 2.0 %      Lymphocyte % 96.0 %      Monocyte % 2.0 %      Neutrophils Absolute 0.05 10*3/mm3      Lymphocytes Absolute 2.30 10*3/mm3      Monocytes Absolute 0.05 10*3/mm3      nRBC 1.0 /100 WBC      Anisocytosis Slight/1+     WBC Morphology Normal     Platelet Estimate Decreased    Narrative:      Reviewed by Pathologist within the past 30 days on 5/18/2023.     CBC & Differential [[id number]]  (Abnormal) Collected: 05/25/23 0854    Specimen: Blood Updated: 05/25/23 0954    Narrative:      The following orders were created for panel order CBC & Differential.  Procedure                               Abnormality         Status                     ---------                               -----------         ------                     CBC Auto Differential[[id number]]        Abnormal            Final result               Scan Slide[[id number]]                                       Final result                 Please view results for these tests on the individual orders.    Scan Slide [[id number]] Collected: 05/25/23 0854    Specimen: Blood Updated: 05/25/23 0954     Scan Slide --     Comment: See Manual Differential Results       CBC Auto Differential [[id number]]  (Abnormal) Collected: 05/25/23 0854    Specimen: Blood Updated: 05/25/23 0954     WBC 2.40 10*3/mm3      RBC 3.05  10*6/mm3      Hemoglobin 8.3 g/dL      Hematocrit 25.4 %      MCV 83.5 fL      MCH 27.4 pg      MCHC 32.8 g/dL      RDW 17.7 %      RDW-SD 51.6 fl      MPV 7.6 fL      Platelets 10 10*3/mm3     Narrative:      The previously reported component NRBC is no longer being reported. Previous result was 0.1 /100 WBC (Reference Range: 0.0-0.2 /100 WBC) on 5/25/2023 at 0910 EDT.    POC Glucose Once [[id number]]  (Abnormal) Collected: 05/25/23 0724    Specimen: Blood Updated: 05/25/23 0725     Glucose 118 mg/dL      Comment: Serial Number: [id number]:  [id number]       Basic Metabolic Panel [[id number]]  (Abnormal) Collected: 05/24/23 2248    Specimen: Blood Updated: 05/24/23 2348     Glucose 207 mg/dL      BUN 32 mg/dL      Creatinine 0.99 mg/dL      Sodium 140 mmol/L      Potassium 4.5 mmol/L      Chloride 101 mmol/L      CO2 26.0 mmol/L      Calcium 8.7 mg/dL      BUN/Creatinine Ratio 32.3     Anion Gap 13.0 mmol/L      eGFR 70.9 mL/min/1.73     Narrative:      GFR Normal >60  Chronic Kidney Disease <60  Kidney Failure <15      CBC (No Diff) [[id number]]  (Abnormal) Collected: 05/24/23 2248    Specimen: Blood Updated: 05/24/23 2346     WBC 2.10 10*3/mm3      RBC 2.74 10*6/mm3      Hemoglobin 7.6 g/dL      Hematocrit 22.8 %      MCV 83.2 fL      MCH 27.6 pg      MCHC 33.2 g/dL      RDW 17.8 %      RDW-SD 52.1 fl      Platelets 8 10*3/mm3     POC Glucose Once [[id number]]  (Abnormal) Collected: 05/24/23 2052    Specimen: Blood Updated: 05/24/23 2057     Glucose 112 mg/dL      Comment: Serial Number: [id number]:  [id number]           IMAGING REVIEWED:  No radiology results for the last day    Assessment & Plan      ASSESSMENT:    1. Pancytopenia: There is progressive improvement in the blood counts though very slowly.  Platelets are up to 10,000.  Continue observation.  Fall precautions  2. Accelerated phase CML: She will remain off of the ponatininb for now.   3. Alloimmunization: No need for transfusion.     4. Bilateral subdural hematomas: No suggestion of further bleeding.   5. Back pain. Increased the dose of the oxycodone.      PLANS    1. Daily CBCs  2. Anticipate discharge soon if she continues to improve hematologically      Electronically signed by Meghann Lerma MD, 05/25/23, 6:12 PM EDT.

## 2023-05-25 NOTE — PLAN OF CARE
Goal Outcome Evaluation:            Assessment: Nieves Mc presents with functional mobility impairments  Related to Pancytopenia with hx of SDH and CMLwhich indicate the need for skilled intervention. Tolerating session today without incident.   Pt progressing with gait but limited by fatigue and c/o weakness.  Pt verbalizes that she uses RW every time she is up as educated by previous PT. Will continue to follow and progress as tolerated. She would benefit from HH PT at discharge.

## 2023-05-25 NOTE — CASE MANAGEMENT/SOCIAL WORK
Continued Stay Note  [facility]     Patient Name: Nieves Mc  MRN: [id number]  Today's Date: 5/25/2023    Admit Date: 5/17/2023    Plan: Anticipate routine home. [place] referral for 3in1 BSC and rollator (orders pending from MD).   Discharge Plan     Row Name 05/25/23 1510       Plan    Plan Anticipate routine home. [place] referral for 3in1 BSC and rollator (orders pending from MD).    Plan Comments Barrier to D/C: monitoring platelets, heme/onc following.                    Expected Discharge Date and Time     Expected Discharge Date Expected Discharge Time    May 29, 2023         Phone communication or documentation only - no physical contact with patient or family.    DEL[place] [address]    Office: [phone]  Fax: [phone]

## 2023-05-25 NOTE — PLAN OF CARE
Goal Outcome Evaluation:      Pt has been sleeping through the night. Pt blood pressure was on the lower side at the beginning of the shift; on call provider was notified and midodrine was increased, blood pressure recheck per patient request showed a lower reading and on call provider was notified again as well and a bolus was ordered. Pt blood pressure has since increased. VSS at this time with no issues noted.

## 2023-05-25 NOTE — PROGRESS NOTES
[facility]     Progress Note    Patient Name: Nieves Mc  : 1975  MRN: [id number]  Primary Care Physician:  Alida Latham APRN  Date of admission: 2023  Service date and time: 23 17:32 EDT  Subjective   Subjective     Chief Complaint: low platelets, platelet transfusion reaction     Patient complains of  -Rib cage pain      Objective   Objective     Vitals:   Temp:  [98.1 °F (36.7 °C)-99.1 °F (37.3 °C)] 98.3 °F (36.8 °C)  Heart Rate:  [] 99  Resp:  [14-17] 14  BP: ()/(33-63) 94/49  Physical Exam    Constitutional: Awake, alert, in no distress at the time of examination.   Eyes: PERRLA, sclerae anicteric, no conjunctival injection   HENT: NCAT, mucous membranes moist   Neck: Supple, no thyromegaly, no lymphadenopathy, trachea midline   Respiratory: Clear to auscultation bilaterally, nonlabored respirations    Cardiovascular: RRR, no murmurs, rubs, or gallops, palpable pedal pulses bilaterally   Gastrointestinal: Positive bowel sounds, soft, nontender, nondistended   Musculoskeletal: No bilateral ankle edema, no clubbing or cyanosis to extremities   Psychiatric: Appropriate affect, cooperative   Neurologic: Oriented x 3, strength symmetric in all extremities, Cranial Nerves grossly intact to confrontation, speech clear   Skin: No rashes     Result Review    Result Review:  I have personally reviewed the results from the time of this admission to 2023 17:32 EDT and agree with these findings:  []  Laboratory list / accordion  []  Microbiology  []  Radiology  []  EKG/Telemetry   []  Cardiology/Vascular   []  Pathology  []  Old records  []  Other:  Most notable findings include:  Platelet count of 10    Assessment & Plan   Assessment / Plan       Active Hospital Problems:  Active Hospital Problems    Diagnosis    • **Pancytopenia    • Bilateral subdural hematomas    • Lumbar radiculopathy    • Traumatic subdural hemorrhage without loss of consciousness    • Chronic  pain    • NICM (nonischemic cardiomyopathy)    • Type 2 diabetes mellitus with diabetic polyneuropathy, with long-term current use of insulin    • Depression with anxiety    • History of pulmonary embolism    • Medically noncompliant    • CML (chronic myelocytic leukemia)      Plan:        DVT prophylaxis:  Mechanical DVT prophylaxis orders are present.    CODE STATUS:   Medical Intervention Limits: NO intubation (DNI); NO artificial nutrition  Level Of Support Discussed With: Patient; Next of Kin (If No Surrogate)  Code Status (Patient has no pulse and is not breathing): CPR (Attempt to Resuscitate)  Medical Interventions (Patient has pulse or is breathing): Limited Support  Release to patient: Routine Release    Disposition:  I expect patient to be discharged in 5  days.    Waqas Oneal MD

## 2023-05-25 NOTE — TELEPHONE ENCOUNTER
Received a call from pt's mother stating that she dropped off a Medicaid letter prior to Hope starting her new chemo regimen and asking to get a copy. I spoke to the financial  and pharmacy technician, neither had any knowledge of a Medicaid letter. Called pt's mother back and left VM letting her know that we do not have the letter and advising her to call back if anything else is needed.

## 2023-05-26 LAB
ANION GAP SERPL CALCULATED.3IONS-SCNC: 10 MMOL/L (ref 5–15)
BUN SERPL-MCNC: 27 MG/DL (ref 6–20)
BUN/CREAT SERPL: 37 (ref 7–25)
CALCIUM SPEC-SCNC: 9.6 MG/DL (ref 8.6–10.5)
CHLORIDE SERPL-SCNC: 101 MMOL/L (ref 98–107)
CO2 SERPL-SCNC: 28 MMOL/L (ref 22–29)
CREAT SERPL-MCNC: 0.73 MG/DL (ref 0.57–1)
DEPRECATED RDW RBC AUTO: 52.1 FL (ref 37–54)
EGFRCR SERPLBLD CKD-EPI 2021: 102.2 ML/MIN/1.73
ERYTHROCYTE [DISTWIDTH] IN BLOOD BY AUTOMATED COUNT: 17.7 % (ref 12.3–15.4)
GLUCOSE BLDC GLUCOMTR-MCNC: 129 MG/DL (ref 70–105)
GLUCOSE BLDC GLUCOMTR-MCNC: 155 MG/DL (ref 70–105)
GLUCOSE BLDC GLUCOMTR-MCNC: 258 MG/DL (ref 70–105)
GLUCOSE BLDC GLUCOMTR-MCNC: 299 MG/DL (ref 70–105)
GLUCOSE SERPL-MCNC: 287 MG/DL (ref 65–99)
HCT VFR BLD AUTO: 25.2 % (ref 34–46.6)
HGB BLD-MCNC: 8.4 G/DL (ref 12–15.9)
LYMPHOCYTES # BLD MANUAL: 1.57 10*3/MM3 (ref 0.7–3.1)
MCH RBC QN AUTO: 27.8 PG (ref 26.6–33)
MCHC RBC AUTO-ENTMCNC: 33.2 G/DL (ref 31.5–35.7)
MCV RBC AUTO: 83.8 FL (ref 79–97)
NEUTROPHILS # BLD AUTO: 0.03 10*3/MM3 (ref 1.7–7)
NEUTROPHILS NFR BLD MANUAL: 2 % (ref 42.7–76)
PLATELET # BLD AUTO: 9 10*3/MM3 (ref 140–450)
PMV BLD AUTO: 8.4 FL (ref 6–12)
POTASSIUM SERPL-SCNC: 4.8 MMOL/L (ref 3.5–5.2)
RBC # BLD AUTO: 3 10*6/MM3 (ref 3.77–5.28)
RBC MORPH BLD: NORMAL
REF LAB TEST RESULTS: NORMAL
SCAN SLIDE: NORMAL
SMALL PLATELETS BLD QL SMEAR: ABNORMAL
SODIUM SERPL-SCNC: 139 MMOL/L (ref 136–145)
VARIANT LYMPHS NFR BLD MANUAL: 98 % (ref 19.6–45.3)
WBC MORPH BLD: NORMAL
WBC NRBC COR # BLD: 1.6 10*3/MM3 (ref 3.4–10.8)

## 2023-05-26 PROCEDURE — 85025 COMPLETE CBC W/AUTO DIFF WBC: CPT | Performed by: INTERNAL MEDICINE

## 2023-05-26 PROCEDURE — 63710000001 INSULIN GLARGINE PER 5 UNITS

## 2023-05-26 PROCEDURE — 25010000002 METHYLPREDNISOLONE PER 40 MG: Performed by: INTERNAL MEDICINE

## 2023-05-26 PROCEDURE — 63710000001 INSULIN LISPRO (HUMAN) PER 5 UNITS

## 2023-05-26 PROCEDURE — 25010000002 DIPHENHYDRAMINE PER 50 MG: Performed by: INTERNAL MEDICINE

## 2023-05-26 PROCEDURE — 36430 TRANSFUSION BLD/BLD COMPNT: CPT

## 2023-05-26 PROCEDURE — P9037 PLATE PHERES LEUKOREDU IRRAD: HCPCS

## 2023-05-26 PROCEDURE — P9100 PATHOGEN TEST FOR PLATELETS: HCPCS

## 2023-05-26 PROCEDURE — 80048 BASIC METABOLIC PNL TOTAL CA: CPT | Performed by: INTERNAL MEDICINE

## 2023-05-26 PROCEDURE — 85007 BL SMEAR W/DIFF WBC COUNT: CPT | Performed by: INTERNAL MEDICINE

## 2023-05-26 PROCEDURE — 82948 REAGENT STRIP/BLOOD GLUCOSE: CPT

## 2023-05-26 PROCEDURE — 99231 SBSQ HOSP IP/OBS SF/LOW 25: CPT | Performed by: INTERNAL MEDICINE

## 2023-05-26 PROCEDURE — P9035 PLATELET PHERES LEUKOREDUCED: HCPCS

## 2023-05-26 RX ORDER — DIPHENHYDRAMINE HYDROCHLORIDE 50 MG/ML
25 INJECTION INTRAMUSCULAR; INTRAVENOUS ONCE
Status: DISCONTINUED | OUTPATIENT
Start: 2023-05-26 | End: 2023-05-26

## 2023-05-26 RX ORDER — METHYLPREDNISOLONE SODIUM SUCCINATE 40 MG/ML
40 INJECTION, POWDER, LYOPHILIZED, FOR SOLUTION INTRAMUSCULAR; INTRAVENOUS ONCE
Status: COMPLETED | OUTPATIENT
Start: 2023-05-26 | End: 2023-05-26

## 2023-05-26 RX ORDER — DIPHENHYDRAMINE HYDROCHLORIDE 50 MG/ML
50 INJECTION INTRAMUSCULAR; INTRAVENOUS ONCE
Status: COMPLETED | OUTPATIENT
Start: 2023-05-26 | End: 2023-05-26

## 2023-05-26 RX ADMIN — Medication 10 ML: at 21:33

## 2023-05-26 RX ADMIN — Medication 10 ML: at 23:13

## 2023-05-26 RX ADMIN — DIPHENHYDRAMINE HYDROCHLORIDE 50 MG: 50 INJECTION, SOLUTION INTRAMUSCULAR; INTRAVENOUS at 21:25

## 2023-05-26 RX ADMIN — LOSARTAN POTASSIUM 25 MG: 25 TABLET, FILM COATED ORAL at 08:42

## 2023-05-26 RX ADMIN — OXYCODONE 5 MG: 5 TABLET ORAL at 22:10

## 2023-05-26 RX ADMIN — MIDODRINE HYDROCHLORIDE 10 MG: 5 TABLET ORAL at 22:11

## 2023-05-26 RX ADMIN — Medication 10 ML: at 21:24

## 2023-05-26 RX ADMIN — INSULIN GLARGINE 27 UNITS: 100 INJECTION, SOLUTION SUBCUTANEOUS at 21:24

## 2023-05-26 RX ADMIN — METOPROLOL SUCCINATE 25 MG: 25 TABLET, EXTENDED RELEASE ORAL at 08:41

## 2023-05-26 RX ADMIN — ALLOPURINOL 500 MG: 300 TABLET ORAL at 08:41

## 2023-05-26 RX ADMIN — Medication 10 ML: at 21:29

## 2023-05-26 RX ADMIN — EMPAGLIFLOZIN 25 MG: 25 TABLET, FILM COATED ORAL at 08:44

## 2023-05-26 RX ADMIN — CITALOPRAM HYDROBROMIDE 10 MG: 20 TABLET ORAL at 08:42

## 2023-05-26 RX ADMIN — MIRTAZAPINE 15 MG: 15 TABLET, FILM COATED ORAL at 21:25

## 2023-05-26 RX ADMIN — Medication 10 ML: at 08:45

## 2023-05-26 RX ADMIN — INSULIN LISPRO 7 UNITS: 100 INJECTION, SOLUTION INTRAVENOUS; SUBCUTANEOUS at 08:45

## 2023-05-26 RX ADMIN — INSULIN LISPRO 11 UNITS: 100 INJECTION, SOLUTION INTRAVENOUS; SUBCUTANEOUS at 18:00

## 2023-05-26 RX ADMIN — OXYCODONE 5 MG: 5 TABLET ORAL at 14:03

## 2023-05-26 RX ADMIN — Medication 10 ML: at 21:31

## 2023-05-26 RX ADMIN — GABAPENTIN 300 MG: 300 CAPSULE ORAL at 16:02

## 2023-05-26 RX ADMIN — METHYLPREDNISOLONE SODIUM SUCCINATE 40 MG: 40 INJECTION, POWDER, FOR SOLUTION INTRAMUSCULAR; INTRAVENOUS at 21:33

## 2023-05-26 RX ADMIN — GABAPENTIN 300 MG: 300 CAPSULE ORAL at 08:41

## 2023-05-26 RX ADMIN — GABAPENTIN 300 MG: 300 CAPSULE ORAL at 21:25

## 2023-05-26 RX ADMIN — MIDODRINE HYDROCHLORIDE 10 MG: 5 TABLET ORAL at 08:43

## 2023-05-26 RX ADMIN — Medication 10 ML: at 21:32

## 2023-05-26 RX ADMIN — SENNOSIDES AND DOCUSATE SODIUM 2 TABLET: 50; 8.6 TABLET ORAL at 21:24

## 2023-05-26 RX ADMIN — SENNOSIDES AND DOCUSATE SODIUM 2 TABLET: 50; 8.6 TABLET ORAL at 08:42

## 2023-05-26 RX ADMIN — MIDODRINE HYDROCHLORIDE 10 MG: 5 TABLET ORAL at 16:02

## 2023-05-26 RX ADMIN — OXYCODONE 5 MG: 5 TABLET ORAL at 08:42

## 2023-05-26 NOTE — PROGRESS NOTES
[facility]     Progress Note    Patient Name: Nieves Mc  : 1975  MRN: [id number]  Primary Care Physician:  Alida Latham APRN  Date of admission: 2023  Service date and time: 23 15:24 EDT  Subjective   Subjective     Chief Complaint: low platelets, platelet transfusion reaction       Patient complains of fatigue today.      Objective   Objective     Vitals:   Temp:  [97.5 °F (36.4 °C)-98.6 °F (37 °C)] 97.7 °F (36.5 °C)  Heart Rate:  [] 96  Resp:  [14-18] 16  BP: ()/(45-90) 129/83  Physical Exam    Constitutional: Awake, alert   Eyes: PERRLA, sclerae anicteric, no conjunctival injection   HENT: NCAT, mucous membranes moist   Neck: Supple, no thyromegaly, no lymphadenopathy, trachea midline   Respiratory: Clear to auscultation bilaterally, nonlabored respirations    Cardiovascular: RRR, no murmurs, rubs, or gallops, palpable pedal pulses bilaterally   Gastrointestinal: Positive bowel sounds, soft, nontender, nondistended   Musculoskeletal: No bilateral ankle edema, no clubbing or cyanosis to extremities   Psychiatric: Appropriate affect, cooperative   Neurologic: Oriented x 3, strength symmetric in all extremities, Cranial Nerves grossly intact to confrontation, speech clear   Skin: No rashes     Result Review    Result Review:  I have personally reviewed the results from the time of this admission to 2023 15:24 EDT and agree with these findings:  [x]  Laboratory list / accordion  []  Microbiology  []  Radiology  []  EKG/Telemetry   []  Cardiology/Vascular   []  Pathology  []  Old records  []  Other:  Most notable findings include: platelet count of 9      Assessment & Plan   Assessment / Plan       Active Hospital Problems:  Active Hospital Problems    Diagnosis    • **Pancytopenia    • Bilateral subdural hematomas    • Lumbar radiculopathy    • Traumatic subdural hemorrhage without loss of consciousness    • Chronic pain    • NICM (nonischemic cardiomyopathy)     • Type 2 diabetes mellitus with diabetic polyneuropathy, with long-term current use of insulin    • Depression with anxiety    • History of pulmonary embolism    • Medically noncompliant    • CML (chronic myelocytic leukemia)      Plan:      • **Pancytopenia     • Bilateral subdural hematomas     • Lumbar radiculopathy     • Traumatic subdural hemorrhage without loss of consciousness     • Chronic pain     • NICM (nonischemic cardiomyopathy)     • Type 2 diabetes mellitus with diabetic polyneuropathy, with long-term current use of insulin     • Depression with anxiety     • History of pulmonary embolism     • Medically noncompliant     • CML (chronic myelocytic leukemia)        Awaiting further recommendations per hematology , will follow cbc closely, watch  for signs of bleeding    DVT prophylaxis:  Mechanical DVT prophylaxis orders are present.    CODE STATUS:   Medical Intervention Limits: NO intubation (DNI); NO artificial nutrition  Level Of Support Discussed With: Patient; Next of Kin (If No Surrogate)  Code Status (Patient has no pulse and is not breathing): CPR (Attempt to Resuscitate)  Medical Interventions (Patient has pulse or is breathing): Limited Support  Release to patient: Routine Release    Disposition:  I expect patient to be discharged 3 days     Waqas Oneal MD

## 2023-05-26 NOTE — PROGRESS NOTES
Hematology/Oncology Inpatient Progress Note    PATIENT NAME: Nieves Mc  : 1975  MRN: [id number]    CHIEF COMPLAINT:     HISTORY OF PRESENT ILLNESS:      Nieves Mc is a 47 y.o. female who presented to [facility] on 2023 with complaints of nausea, vomiting, coughing and chills during a platelet transfusion.  Past medical history of CML on ponatinib, cardiomyopathy, chronic pain, uncontrolled type 1 diabetes, chronic anemia, insomnia, anxiety and depression, pulmonary embolism on chronic anticoagulation with Xarelto, medical noncompliance.       Evaluation in the ED: Patient reported a fall 2 to 3 weeks ago during which she hit the right side of her head a CT of the head was done showing bilateral subdural hygromas suggesting remote subdural hemorrhage; local mass effect with sulcal and effacement and decreased of the size of lateral ventricles but no evidence of herniation; no acute intracranial hemorrhage.  The patient was admitted for further treatment of her pancytopenia and remote subdural hemorrhage with plans for pretreatment with IV Solu-Medrol, Tylenol and Benadryl prior to transfusing another unit of platelets.        23  Hematology/Oncology was consulted on a patient known to us and followed in the office by Dr. Lerma for her diagnosis of CML.  Patient initially presented in  when she was seen in consultation while hospitalized.  At that time the patient was on imatinib.  In  she had progressive thrombocytosis and was transition to nilotinib.  After that she was lost to follow-up until she was once again seen during a hospitalization in .  She was continued on nilotinib and hydroxyurea.  In 2022 she had once again been lost to follow-up for 3 months when she presented in the office after not taking nilotinib during that time.  She had shortness of breath and cough for which a 2D echocardiogram was done and showed a reduced EF of 41 to 45%.  Due to  cardiomyopathy she was transitioned to imatinib and hydroxyurea.  In November 2022 bone marrow biopsy showed her to be 18 accelerated phase CML and that the imatinib was not controlling her malignancy well and she was initiated on ponatinib 45 mg p.o. daily.  She had significant body pain with this dose and for that reason was reduced to 30 mg p.o. daily.  She was once again lost to follow-up for approximately 6 weeks during that time she self reduced to ponatinib 15 mg daily.  She was seen in the office on 5/17/2023 at which time her platelet count was noted to be 8000 and she was severely neutropenic.  Her ponatinib was held and she was sent to the ambulatory care unit for 2 units of platelets to be transfused and a platelet level to be checked posttransfusion for further planning.     Patient has a history of CML on ponatinib.  Patient has been noncompliant with lab evaluations as recommended.  She has been taking 50 mg of ponatinib.  She comes to the office with significant pancytopenia, severe neutropenia, platelets of 7000 and anemia.  Patient was sent to the ambulatory care for platelet transfusions, she developed chills nausea but did receive platelets.  Repeat platelet check was no significantly changed at 8000 for that reason patient was sent to the ER to be admitted to the hospital.  She has a history of falls approximately 2 weeks earlier.  While in the ER she had CT of the head which showed old subdural hematoma.  Patient denies nausea vomiting fevers or chills.  She has not been able to come to the office due to social issues, transportation issues     He/She  has a past medical history of Bone pain, COVID-19 virus infection (01/12/2022), Diabetes mellitus, Extremity pain, Leg pain, Migraine, Pulmonary embolism, and Vision loss.     PCP: Alida Latham APRN    Subjective        5/24/2023: Very uncomfortable this morning. Reports pain in the lower extremities that starts in the lower back and  that is made more severe by standing or attempting to walk. It is a burning type pain, similar to what she had in the past but more severe.  The analgesic she has been receiving has not helped much.       Patient does not have any new issues today    ROS:    Review of Systems   Constitutional: Positive for activity change, appetite change, fatigue and unexpected weight change. Negative for chills, diaphoresis and fever.   HENT: Negative for congestion, dental problem, drooling, ear discharge, ear pain, facial swelling, hearing loss, mouth sores, nosebleeds, postnasal drip, rhinorrhea, sinus pressure, sinus pain, sneezing, sore throat, tinnitus, trouble swallowing and voice change.    Eyes: Negative for photophobia, pain, discharge, redness, itching and visual disturbance.   Respiratory: Negative for apnea, cough, choking, chest tightness, shortness of breath, wheezing and stridor.    Cardiovascular: Negative for chest pain, palpitations and leg swelling.   Gastrointestinal: Negative for abdominal distention, abdominal pain, anal bleeding, blood in stool, constipation, diarrhea, nausea, rectal pain and vomiting.   Endocrine: Negative for cold intolerance, heat intolerance, polydipsia and polyuria.   Genitourinary: Negative for decreased urine volume, difficulty urinating, dysuria, flank pain, frequency, genital sores, hematuria and urgency.   Musculoskeletal: Positive for back pain and myalgias. Negative for arthralgias, gait problem, joint swelling, neck pain and neck stiffness.   Skin: Negative for color change, pallor and rash.   Neurological: Positive for weakness. Negative for dizziness, tremors, seizures, syncope, facial asymmetry, speech difficulty, light-headedness, numbness and headaches.   Hematological: Negative for adenopathy. Does not bruise/bleed easily.   Psychiatric/Behavioral: Negative for agitation, behavioral problems, confusion, decreased concentration, hallucinations, self-injury, sleep  "disturbance and suicidal ideas. The patient is not nervous/anxious.       MEDICATIONS:    Scheduled Meds:  allopurinol, 500 mg, Oral, Daily  budesonide-formoterol, 2 puff, Inhalation, BID - RT  citalopram, 10 mg, Oral, Daily  empagliflozin, 25 mg, Oral, Daily  furosemide, 40 mg, Oral, Daily  gabapentin, 300 mg, Oral, TID  insulin glargine, 1-200 Units, Subcutaneous, Nightly - Glucommander  insulin lispro, 1-200 Units, Subcutaneous, 4x Daily With Meals & Nightly  losartan, 25 mg, Oral, Daily  metoprolol succinate XL, 25 mg, Oral, Daily  midodrine, 10 mg, Oral, Q8H  mirtazapine, 15 mg, Oral, Nightly  senna-docusate sodium, 2 tablet, Oral, BID  sodium chloride, 10 mL, Intravenous, Q12H       Continuous Infusions:  pain,        PRN Meds:  •  acetaminophen **OR** acetaminophen **OR** acetaminophen  •  ALPRAZolam  •  senna-docusate sodium **AND** polyethylene glycol **AND** bisacodyl **AND** bisacodyl  •  Calcium Replacement - Follow Nurse / BPA Driven Protocol  •  dextrose  •  dextrose  •  glucagon (human recombinant)  •  hydrocortisone  •  hydrOXYzine  •  insulin lispro  •  Magnesium Standard Dose Replacement - Follow Nurse / BPA Driven Protocol  •  melatonin  •  ondansetron **OR** ondansetron  •  oxyCODONE  •  Phosphorus Replacement - Follow Nurse / BPA Driven Protocol  •  Potassium Replacement - Follow Nurse / BPA Driven Protocol  •  prochlorperazine  •  sodium chloride  •  sodium chloride     ALLERGIES:  No Known Allergies    Objective    VITALS:   BP 95/48   Pulse 78   Temp 98.4 °F (36.9 °C) (Oral)   Resp 16   Ht 162.6 cm (64\")   Wt 57.8 kg (127 lb 6.8 oz)   LMP 05/25/2022 (Approximate)   SpO2 95%   BMI 21.87 kg/m²     PHYSICAL EXAM: (performed by MD)  Physical Exam  Constitutional:       General: She is not in acute distress.     Appearance: Normal appearance. She is ill-appearing. She is not toxic-appearing or diaphoretic.   HENT:      Head: Normocephalic and atraumatic.      Right Ear: External ear " normal.      Left Ear: External ear normal.      Nose: Nose normal.      Mouth/Throat:      Mouth: Mucous membranes are moist.      Pharynx: Oropharynx is clear. No oropharyngeal exudate or posterior oropharyngeal erythema.   Eyes:      General: No scleral icterus.        Right eye: No discharge.         Left eye: No discharge.      Conjunctiva/sclera: Conjunctivae normal.      Pupils: Pupils are equal, round, and reactive to light.   Cardiovascular:      Rate and Rhythm: Normal rate and regular rhythm.      Pulses: Normal pulses.      Heart sounds: No murmur heard.    No friction rub. No gallop.   Pulmonary:      Effort: Pulmonary effort is normal. No respiratory distress.      Breath sounds: No stridor. No wheezing, rhonchi or rales.   Abdominal:      General: Abdomen is flat. Bowel sounds are normal. There is no distension.      Palpations: Abdomen is soft. There is no mass.      Tenderness: There is no abdominal tenderness. There is no right CVA tenderness, left CVA tenderness, guarding or rebound.      Hernia: No hernia is present.   Musculoskeletal:         General: No swelling, tenderness, deformity or signs of injury.      Cervical back: No rigidity.      Right lower leg: No edema.      Left lower leg: No edema.   Lymphadenopathy:      Cervical: No cervical adenopathy.   Skin:     Coloration: Skin is pale. Skin is not jaundiced.      Findings: No bruising, lesion or rash.   Neurological:      General: No focal deficit present.      Mental Status: She is alert and oriented to person, place, and time. Mental status is at baseline.      Cranial Nerves: No cranial nerve deficit.      Motor: No weakness.      Gait: Gait normal.   Psychiatric:         Mood and Affect: Mood normal.         Behavior: Behavior normal.         Thought Content: Thought content normal.         Judgment: Judgment normal.       I have reexamined the patient and the results are consistent with the previously documented exam. Meghann  Leeann Lerma MD     RECENT LABS:  Lab Results (last 24 hours)     Procedure Component Value Units Date/Time    POC Glucose Once [[id number]]  (Abnormal) Collected: 05/26/23 1622    Specimen: Blood Updated: 05/26/23 1623     Glucose 299 mg/dL      Comment: Serial Number: [id number]:  [id number]       HLA Antibody Identification Class I High Resolution - Miscellaneous Test [[id number]] Collected: 05/19/23 1151    Specimen: Blood Updated: 05/26/23 1623     Miscellaneous Lab Test Result See attached report    POC Glucose Once [[id number]]  (Abnormal) Collected: 05/26/23 1129    Specimen: Blood Updated: 05/26/23 1130     Glucose 155 mg/dL      Comment: Serial Number: [id number]:  [id number]       POC Glucose Once [[id number]]  (Abnormal) Collected: 05/26/23 0706    Specimen: Blood Updated: 05/26/23 0708     Glucose 258 mg/dL      Comment: Serial Number: [id number]:  [id number]       Manual Differential [[id number]]  (Abnormal) Collected: 05/26/23 0032    Specimen: Blood Updated: 05/26/23 0208     Neutrophil % 2.0 %      Lymphocyte % 98.0 %      Neutrophils Absolute 0.03 10*3/mm3      Lymphocytes Absolute 1.57 10*3/mm3      RBC Morphology Normal     WBC Morphology Normal     Platelet Estimate Decreased    CBC & Differential [[id number]]  (Abnormal) Collected: 05/26/23 0032    Specimen: Blood Updated: 05/26/23 0208    Narrative:      The following orders were created for panel order CBC & Differential.  Procedure                               Abnormality         Status                     ---------                               -----------         ------                     CBC Auto Differential[[id number]]        Abnormal            Final result               Scan Slide[[id number]]                                       Final result                 Please view results for these tests on the individual orders.    Scan Slide [[id number]] Collected: 05/26/23 0032    Specimen: Blood Updated: 05/26/23 0208     Scan  Slide --     Comment: See Manual Differential Results       CBC Auto Differential [[id number]]  (Abnormal) Collected: 05/26/23 0032    Specimen: Blood Updated: 05/26/23 0208     WBC 1.60 10*3/mm3      RBC 3.00 10*6/mm3      Hemoglobin 8.4 g/dL      Hematocrit 25.2 %      MCV 83.8 fL      MCH 27.8 pg      MCHC 33.2 g/dL      RDW 17.7 %      RDW-SD 52.1 fl      MPV 8.4 fL      Platelets 9 10*3/mm3     Narrative:      The previously reported component NRBC is no longer being reported. Previous result was 0.0 /100 WBC (Reference Range: 0.0-0.2 /100 WBC) on 5/26/2023 at 0059 EDT.    Basic Metabolic Panel [[id number]]  (Abnormal) Collected: 05/26/23 0032    Specimen: Blood Updated: 05/26/23 0113     Glucose 287 mg/dL      BUN 27 mg/dL      Creatinine 0.73 mg/dL      Sodium 139 mmol/L      Potassium 4.8 mmol/L      Chloride 101 mmol/L      CO2 28.0 mmol/L      Calcium 9.6 mg/dL      BUN/Creatinine Ratio 37.0     Anion Gap 10.0 mmol/L      eGFR 102.2 mL/min/1.73     Narrative:      GFR Normal >60  Chronic Kidney Disease <60  Kidney Failure <15      POC Glucose Once [[id number]]  (Abnormal) Collected: 05/25/23 2037    Specimen: Blood Updated: 05/25/23 2038     Glucose 203 mg/dL      Comment: Serial Number: [id number]:  [id number]           IMAGING REVIEWED:  XR Ribs Bilateral 3 View    Result Date: 5/25/2023  No acute findings. Electronically Signed: Hari Ray  5/25/2023 11:40 PM EDT  Workstation ID: [id number]      Assessment & Plan      ASSESSMENT:    1. Pancytopenia: There is progressive improvement in the blood counts though very slowly.  Platelets are stable at 9000.  Transfuse HLA matched platelets as soon as is available.  I have contacted the blood bank and not available yet.  Continue observation.  Fall precautions reviewed with patient  2. Accelerated phase CML: She will remain off of the ponatininb for now.   3. Alloimmunization: No need for transfusion.  She does not respond to platelet  transfusions  4. Bilateral subdural hematomas: No suggestion of further bleeding.   5. Back pain. Increased the dose of the oxycodone.      PLANS    1. Continue to monitor daily CBCs  2. Transfuse HLA matched platelets once available.  Will check with blood bank  3. Anticipate discharge soon if she continues to improve hematologically      Electronically signed by Meghann Lerma MD, 05/26/23, 5:41 PM EDT.

## 2023-05-26 NOTE — PLAN OF CARE
Goal Outcome Evaluation:      Pt has slept through the night after receiving her pain medication and has no further issues noted. Blood pressure has been on the lower side during the middle of the night after midodrine was given. Pt has had family at bedside through out the shift that has been attentive to her needs. Call light has remained within reach and is able to make needs known to staff with no concerns noted.

## 2023-05-26 NOTE — CONSULTS
Nutrition Services    Patient Name: Nieves Mc  YOB: 1975  MRN: [id number]  Admission date: 2023    Comment:    Continue to encourage PO intake    Continue ONS: Boost Plus q daily (Provides 360 kcals, 14 g of protein if consumed)   Magic Cups at lunch/dinner (Provides 580 kcals, 18 g protein if consumed)       PPE Documentation        PPE Worn By Provider mask   PPE Worn By Patient  N/A     CLINICAL NUTRITION ASSESSMENT      Reason for Assessment Follow up protocol   : MST: 4, low-normal BMI     H&P      Past Medical History:   Diagnosis Date   • Bone pain    • COVID-19 virus infection 2022   • Diabetes mellitus    • Extremity pain     Carlin. legs pain   • Leg pain     left leg greater   • Migraine    • Pulmonary embolism    • Vision loss     doing surgery       Past Surgical History:   Procedure Laterality Date   • BONE MARROW BIOPSY     • BREAST SURGERY     • BRONCHOSCOPY N/A 2022    Procedure: BRONCHOSCOPY bilateral lung washing;  Surgeon: Charlene Camara MD;  Location: [facility] ENDOSCOPY;  Service: Pulmonary;  Laterality: N/A;  post: rule out infection vs transfusion lung injury   •  SECTION     • CHOLECYSTECTOMY     • EYE SURGERY      laser surgery due  to hemmorage--- 2021-- another surgery  lt eye11/15/21   • RETINAL DETACHMENT SURGERY     • SPINE SURGERY      L[place]ardi spinal block   • TUBAL ABDOMINAL LIGATION          Current Problems   Pancytopenia  -oncology following   -neutropenic precautions    Remote subdural hemorrhage   -s/p fall 2-3 weeks ago  -neurosurgery following    CML       Encounter Information        Trending Narrative     : Visited pt in room, pt on the phone at time of visit, RD unable to complete full nutrition assessment. RD observes pt is eating lunch during time of visit. Visually agree w/ dx from last week.      : Pt admitted with pancytopenia. Pt with CML receiving chemotherapy at home. Pt reports she fell 2-3 weeks ago. Pt with  "remote subdural hemorrhage. Neurosurgery following. Pt states she has eaten very little in the past 2-3 weeks since falling. The pt reports she has been laying in bed in pain. Pt wanted to drink ONS and remembers liking magic cups. RD provided pt with ONS at time of visit. RD encouraged pt to ask for these when she wants one. NFPE completed, consistent with nutrition diagnosis of malnutrition using AND/ASPEN criteria. See MSA below.        Anthropometrics        Current Height, Weight Height: 162.6 cm (64\")  Weight: 57.8 kg (127 lb 6.8 oz) (05/26/23 0559)       Ideal Body Weight (IBW) 120#   Usual Body Weight (UBW) >200# prior to leukemia per pt       Trending Weight Hx     This admission: 5/26: 127 lb , wt up 12lb, pt fluid positive + 6.5L (14lb)   5/19: 115# - scale              PTA: ~14% wt loss x 1.5 months, pt states she has hardly eaten anything since falling and hitting her head because she has been in so much pain    Wt Readings from Last 30 Encounters:   05/26/23 0559 57.8 kg (127 lb 6.8 oz)   05/25/23 0542 57.9 kg (127 lb 10.3 oz)   05/24/23 0552 57.6 kg (126 lb 15.8 oz)   05/23/23 0500 56.7 kg (125 lb)   05/22/23 0500 55.4 kg (122 lb 2.2 oz)   05/21/23 0500 56.3 kg (124 lb 1.9 oz)   05/20/23 0540 56 kg (123 lb 7.3 oz)   05/19/23 0500 52.3 kg (115 lb 4.8 oz)   05/17/23 1749 49.9 kg (110 lb)   05/17/23 1105 50 kg (110 lb 3.2 oz)   04/08/23 1141 61.2 kg (134 lb 14.7 oz)   04/06/23 1451 63.4 kg (139 lb 12.8 oz)   03/23/23 1434 71.8 kg (158 lb 6.4 oz)   03/15/23 1530 71.2 kg (157 lb)   03/10/23 1116 70.3 kg (155 lb)   03/02/23 1436 70.9 kg (156 lb 6.4 oz)   02/20/23 1456 65.8 kg (145 lb)   02/13/23 1426 65.6 kg (144 lb 9.6 oz)   02/03/23 1149 68 kg (150 lb)   01/26/23 1425 65 kg (143 lb 6.4 oz)   01/19/23 1429 64.3 kg (141 lb 12.8 oz)   01/12/23 1444 65 kg (143 lb 6.4 oz)   01/05/23 1109 62.6 kg (138 lb)   11/16/22 0727 68.5 kg (151 lb)   11/16/22 0539 68.6 kg (151 lb 4.8 oz)   11/16/22 0338 71.1 kg (156 lb 12 " oz)   11/15/22 0901 68 kg (150 lb)   11/15/22 0328 82 kg (180 lb 12.4 oz)   11/14/22 0349 79 kg (174 lb 2.6 oz)   11/13/22 0418 77.8 kg (171 lb 8.3 oz)   11/12/22 0526 79 kg (174 lb 2.6 oz)   11/12/22 0329 79 kg (174 lb 2.6 oz)   11/11/22 0416 77.1 kg (169 lb 15.6 oz)   11/09/22 1001 62.2 kg (137 lb 3.2 oz)   11/08/22 1100 62.7 kg (138 lb 3 oz)   11/08/22 0422 67 kg (147 lb 11.3 oz)   11/07/22 1100 64.7 kg (142 lb 9.6 oz)   11/03/22 0930 68 kg (150 lb)   10/31/22 1253 68.3 kg (150 lb 9.2 oz)   10/19/22 1028 56.2 kg (123 lb 14.4 oz)   10/12/22 1043 56.2 kg (124 lb)   09/22/22 0959 60.8 kg (134 lb)   09/15/22 1111 60.8 kg (134 lb)   08/17/22 0832 60.3 kg (133 lb)   08/16/22 1527 59.5 kg (131 lb 1.6 oz)   08/09/22 1523 56.7 kg (125 lb)   08/04/22 0545 60.6 kg (133 lb 9.6 oz)   08/03/22 0444 61.6 kg (135 lb 12.9 oz)   08/02/22 0521 61.4 kg (135 lb 5.8 oz)   08/01/22 0524 61.3 kg (135 lb 2.3 oz)   07/30/22 0531 61 kg (134 lb 7.7 oz)   07/29/22 0500 60.8 kg (134 lb 0.6 oz)   07/28/22 2316 60.8 kg (134 lb 0.6 oz)   07/27/22 1624 60.5 kg (133 lb 6.1 oz)   07/27/22 1542 57.6 kg (127 lb)   07/08/22 0500 58 kg (127 lb 13.9 oz)   07/07/22 1500 56.2 kg (124 lb)   07/07/22 0530 59.2 kg (130 lb 8.2 oz)   07/05/22 0504 57.2 kg (126 lb)   07/02/22 0513 56.3 kg (124 lb 3.2 oz)   06/30/22 0504 57.4 kg (126 lb 9.6 oz)   06/29/22 2328 56.5 kg (124 lb 9.6 oz)   06/29/22 2019 56.5 kg (124 lb 9.6 oz)   06/29/22 1500 54.4 kg (120 lb)   06/10/22 0500 56.1 kg (123 lb 10.9 oz)   06/09/22 0500 58.5 kg (128 lb 15.5 oz)   06/05/22 0929 56.7 kg (125 lb)   06/01/22 1625 56.7 kg (125 lb)   06/01/22 1440 56.7 kg (125 lb)   05/18/22 0920 56.7 kg (125 lb)      BMI kg/m2 Body mass index is 21.87 kg/m².       Labs        Pertinent Labs Reviewed, management per attending.  Hypokalemia - replaced 5/19  Hyperglycemia - steroid    Results from last 7 days   Lab Units 05/26/23  0032 05/24/23  2248 05/23/23  2301 05/22/23  0857 05/21/23  0721 05/20/23  0152    SODIUM mmol/L 139 140 142 144 143 143   POTASSIUM mmol/L 4.8 4.5 4.4 3.6 4.3 4.7   CHLORIDE mmol/L 101 101 102 103 104 104   CO2 mmol/L 28.0 26.0 28.0 29.0 25.0 28.0   BUN mg/dL 27* 32* 26* 25* 23* 24*   CREATININE mg/dL 0.73 0.99 0.85 0.72 0.66 0.59   CALCIUM mg/dL 9.6 8.7 8.8 9.1 9.1 8.6   BILIRUBIN mg/dL  --   --   --  1.1 2.0* 1.1   ALK PHOS U/L  --   --   --  280* 282* 229*   ALT (SGPT) U/L  --   --   --  38* 36* 22   AST (SGOT) U/L  --   --   --  28 26 14   GLUCOSE mg/dL 287* 207* 235* 224* 525* 291*     Results from last 7 days   Lab Units 05/26/23  0032 05/20/23  1413 05/20/23  0152   MAGNESIUM mg/dL  --   --  1.6   HEMOGLOBIN g/dL 8.4*   < > 7.5*   HEMATOCRIT % 25.2*   < > 22.1*    < > = values in this interval not displayed.     COVID19   Date Value Ref Range Status   07/27/2022 Not Detected Not Detected - Ref. Range Final     Lab Results   Component Value Date    HGBA1C 7.60 (H) 05/21/2023        Medications    Scheduled Medications allopurinol, 500 mg, Oral, Daily  budesonide-formoterol, 2 puff, Inhalation, BID - RT  citalopram, 10 mg, Oral, Daily  empagliflozin, 25 mg, Oral, Daily  furosemide, 40 mg, Oral, Daily  gabapentin, 300 mg, Oral, TID  insulin glargine, 1-200 Units, Subcutaneous, Nightly - Glucommander  insulin lispro, 1-200 Units, Subcutaneous, 4x Daily With Meals & Nightly  losartan, 25 mg, Oral, Daily  metoprolol succinate XL, 25 mg, Oral, Daily  midodrine, 10 mg, Oral, Q8H  mirtazapine, 15 mg, Oral, Nightly  senna-docusate sodium, 2 tablet, Oral, BID  sodium chloride, 10 mL, Intravenous, Q12H        Infusions pain,         PRN Medications •  acetaminophen **OR** acetaminophen **OR** acetaminophen  •  ALPRAZolam  •  senna-docusate sodium **AND** polyethylene glycol **AND** bisacodyl **AND** bisacodyl  •  Calcium Replacement - Follow Nurse / BPA Driven Protocol  •  dextrose  •  dextrose  •  glucagon (human recombinant)  •  hydrocortisone  •  hydrOXYzine  •  insulin lispro  •  Magnesium  Standard Dose Replacement - Follow Nurse / BPA Driven Protocol  •  melatonin  •  ondansetron **OR** ondansetron  •  oxyCODONE  •  Phosphorus Replacement - Follow Nurse / BPA Driven Protocol  •  Potassium Replacement - Follow Nurse / BPA Driven Protocol  •  prochlorperazine  •  sodium chloride  •  sodium chloride     Physical Findings        Trending Physical   Appearance, NFPE 5/26: Visually agree      5/19: NFPE completed, consistent with nutrition diagnosis of malnutrition using AND/ASPEN criteria. See MSA below.      --  Edema  None documented     Bowel Function BM 5/25     Tubes No feeding tube     Chewing/Swallowing No reported difficulty      Skin No pressure injuries documented     --  Current Nutrition Orders & Evaluation of Intake       Oral Nutrition     Food Allergies NKFA   Current PO Diet Diet: Diabetic Diets; Consistent Carbohydrate; Neutropenic/Low Microbial; Texture: Regular Texture (IDDSI 7); Fluid Consistency: Thin (IDDSI 0)   Supplement Boost Plus Q daily  Magic cups BID   PO Evaluation     Trending % PO Intake 5/26: 100% x 1-2 meals/day   5/19: 25% x 1 meal   --  Nutritional Risk Screening        NRS-2002 Score          Nutrition Diagnosis         Nutrition Dx Problem 1 Moderate chronic disease related malnutrition related to decreased appetite in the setting of chronic myeloid leukemia as evidenced by PO intake meeting less than 75% of estimated energy requirement for greater than or equal to 3 months, > 10% wt loss in less than 6 months, and moderate muscle and fat wasting per NFPE.       Nutrition Dx Problem 2        Intervention Goal         Intervention Goal(s) PO intake > 75%, ONS acceptance, wt maintenance/wt trend toward IBW     Nutrition Intervention        RD Action Continue ONS        Nutrition Prescription          Diet Prescription Neutropenic/low microbial    Supplement Prescription Boost Plus q daily (Provides 360 kcals, 14 g of protein if consumed)  Magic Cups at lunch/dinner  (Provides 580 kcals, 18 g protein if consumed)    --  Monitor/Evaluation        Monitor Per protocol, PO intake, Supplement intake, Pertinent labs, Weight, Skin status, GI status, Symptoms, POC/GOC     Malnutrition Severity Assessment      Patient meets criteria for : Moderate (non-severe) Malnutrition             Electronically signed by:  Eva Wolff RD  05/26/23 11:55 EDT

## 2023-05-26 NOTE — CASE MANAGEMENT/SOCIAL WORK
Continued Stay Note  [facility]     Patient Name: Nieves Mc  MRN: [id number]  Today's Date: 5/26/2023    Admit Date: 5/17/2023    Plan: Anticipate routine home. [place] referral for 3in1 BSC and rollator (orders pending from MD).   Discharge Plan     Row Name 05/26/23 1108       Plan    Plan Anticipate routine home. [place] referral for 3in1 BSC and rollator (orders pending from MD).    Plan Comments Barrier to D/C: heme/onc following, platelets 9, WBC 1.6.                    Expected Discharge Date and Time     Expected Discharge Date Expected Discharge Time    May 29, 2023         Phone communication or documentation only - no physical contact with patient or family.    J[place], [place] [address]    Office: [phone]  Fax: [phone]

## 2023-05-26 NOTE — NURSING NOTE
Awaiting HLA Match platelets- called blood bank to see if the have them yet and they will call back to notify

## 2023-05-26 NOTE — PROGRESS NOTES
visited with patient, her mother and daughter at bedside.    Patient believes that her hospitalization is going to continue on due to platelets. Patient reports that there is a possibility for a donor that would benefit her. She is awaiting to hear if that is an option. Requested continued follow up. No needs or concerns at this time.    Chaplain Max Cobb

## 2023-05-26 NOTE — NURSING NOTE
Received patient sitting on the bed, on her celphone. Daughter at bedside. Not in acute respiratory distress.     Introduce myself to  patient that I am her new nurse. Inform her that because she has history of recent  fall, she will be on fall precaution- including wearing a yellow gown, armband wrist, and to turn on bed alarm, patient refuses it. Per patient she has been in the hospital for a week and the bed alarm was never on.  Educated her about it. Patient still refuses it. Call light in reach. Will continue to monitor.

## 2023-05-26 NOTE — CASE MANAGEMENT/SOCIAL WORK
Social Work Assessment  [facility]     Patient Name: Nieves Mc  MRN: [id number]  Today's Date: 5/26/2023    Admit Date: 5/17/2023     Psychosocial       Row Name 05/26/23 1524       Values/Beliefs    Spiritual, Cultural Beliefs, Shinto Practices, Values that Affect Care no       Intellectual Performance WDL    Level of Consciousness Alert       Coping/Stress    Major Change/Loss/Stressor medical condition/diagnosis;financial;inadequate services    Patient Personal Strengths expressive of needs;strong support system    Sources of Support parent(s);significant other    Techniques to [place] with Loss/Stress/Change diversional activities    Reaction to Health Status adjusting    Understanding of Condition and Treatment adequate understanding of medical condition;adequate understanding of treatment    Coping/Stress Comments Completed LACE screen. Address transportation barriers in earlier visit to help eliminate barriers getting to/from medical appointments. Patient relies on family support for various needs.       Developmental Stage (Leoniksson's)    Developmental Stage Stage 7 (35-65 years/Middle Adulthood) Generativity vs. Stagnation       C-SSRS (Recent)    Q1 Wished to be Dead (Past Month) no    Q2 Suicidal Thoughts (Past Month) no    Q6 Suicide Behavior (Lifetime) no       Violence Risk    Feels Like Hurting Others no    Previous Attempt to Harm Others no                   Abuse/Neglect       Row Name 05/26/23 1526       Personal Safety    Feels Unsafe at Home or Work/School no    Feels Threatened by Someone no    Does Anyone Try to Keep You From Having Contact with Others or Doing Things Outside Your Home? no    Physical Signs of Abuse Present no                   Legal       Row Name 05/26/23 1526       Financial/Legal    Source of Income disability       Legal    Criminal Activity/Legal Involvement none                   Substance Abuse       Row Name 05/26/23 1526       Substance Use    Substance Use Status  never used       AUDIT-C (Alcohol Use Disorders ID Test)    Q1: How often do you have a drink containing alcohol? Never    Q2: How many drinks containing alcohol do you have on a typical day when you are drinking? None    Q3: How often do you have six or more drinks on one occasion? Never    Audit-C Score 0             BLANCHE Bowman    Phone: [phone]  Cell: [phone]  Fax: [phone]  [email or web address]

## 2023-05-27 LAB
ANISOCYTOSIS BLD QL: ABNORMAL
BH BB BLOOD EXPIRATION DATE: NORMAL
BH BB BLOOD EXPIRATION DATE: NORMAL
BH BB BLOOD TYPE BARCODE: 6200
BH BB BLOOD TYPE BARCODE: 6200
BH BB DISPENSE STATUS: NORMAL
BH BB DISPENSE STATUS: NORMAL
BH BB PRODUCT CODE: NORMAL
BH BB PRODUCT CODE: NORMAL
BH BB UNIT NUMBER: NORMAL
BH BB UNIT NUMBER: NORMAL
DEPRECATED RDW RBC AUTO: 52.9 FL (ref 37–54)
ERYTHROCYTE [DISTWIDTH] IN BLOOD BY AUTOMATED COUNT: 18 % (ref 12.3–15.4)
GLUCOSE BLDC GLUCOMTR-MCNC: 174 MG/DL (ref 70–105)
GLUCOSE BLDC GLUCOMTR-MCNC: 234 MG/DL (ref 70–105)
GLUCOSE BLDC GLUCOMTR-MCNC: 272 MG/DL (ref 70–105)
GLUCOSE BLDC GLUCOMTR-MCNC: 300 MG/DL (ref 70–105)
GLUCOSE BLDC GLUCOMTR-MCNC: 349 MG/DL (ref 70–105)
GLUCOSE BLDC GLUCOMTR-MCNC: 366 MG/DL (ref 70–105)
HCT VFR BLD AUTO: 25.2 % (ref 34–46.6)
HGB BLD-MCNC: 8.3 G/DL (ref 12–15.9)
LYMPHOCYTES # BLD MANUAL: 0.89 10*3/MM3 (ref 0.7–3.1)
LYMPHOCYTES NFR BLD MANUAL: 6 % (ref 5–12)
MCH RBC QN AUTO: 27.6 PG (ref 26.6–33)
MCHC RBC AUTO-ENTMCNC: 32.9 G/DL (ref 31.5–35.7)
MCV RBC AUTO: 84 FL (ref 79–97)
METAMYELOCYTES NFR BLD MANUAL: 1 % (ref 0–0)
MONOCYTES # BLD: 0.07 10*3/MM3 (ref 0.1–0.9)
NEUTROPHILS # BLD AUTO: 0.13 10*3/MM3 (ref 1.7–7)
NEUTROPHILS NFR BLD MANUAL: 9 % (ref 42.7–76)
NEUTS BAND NFR BLD MANUAL: 3 % (ref 0–5)
PLATELET # BLD AUTO: 33 10*3/MM3 (ref 140–450)
PMV BLD AUTO: 8.4 FL (ref 6–12)
RBC # BLD AUTO: 3 10*6/MM3 (ref 3.77–5.28)
SCAN SLIDE: NORMAL
SMALL PLATELETS BLD QL SMEAR: ABNORMAL
UNIT  ABO: NORMAL
UNIT  ABO: NORMAL
UNIT  RH: NORMAL
UNIT  RH: NORMAL
VARIANT LYMPHS NFR BLD MANUAL: 3 % (ref 0–5)
VARIANT LYMPHS NFR BLD MANUAL: 78 % (ref 19.6–45.3)
WBC MORPH BLD: NORMAL
WBC NRBC COR # BLD: 1.1 10*3/MM3 (ref 3.4–10.8)

## 2023-05-27 PROCEDURE — 63710000001 INSULIN GLARGINE PER 5 UNITS

## 2023-05-27 PROCEDURE — P9037 PLATE PHERES LEUKOREDU IRRAD: HCPCS

## 2023-05-27 PROCEDURE — P9035 PLATELET PHERES LEUKOREDUCED: HCPCS

## 2023-05-27 PROCEDURE — 36430 TRANSFUSION BLD/BLD COMPNT: CPT

## 2023-05-27 PROCEDURE — 63710000001 INSULIN LISPRO (HUMAN) PER 5 UNITS

## 2023-05-27 PROCEDURE — 85007 BL SMEAR W/DIFF WBC COUNT: CPT | Performed by: INTERNAL MEDICINE

## 2023-05-27 PROCEDURE — 82948 REAGENT STRIP/BLOOD GLUCOSE: CPT

## 2023-05-27 PROCEDURE — 25010000002 DIPHENHYDRAMINE PER 50 MG: Performed by: INTERNAL MEDICINE

## 2023-05-27 PROCEDURE — 25010000002 DIPHENHYDRAMINE PER 50 MG: Performed by: NURSE PRACTITIONER

## 2023-05-27 PROCEDURE — 99232 SBSQ HOSP IP/OBS MODERATE 35: CPT | Performed by: INTERNAL MEDICINE

## 2023-05-27 PROCEDURE — P9100 PATHOGEN TEST FOR PLATELETS: HCPCS

## 2023-05-27 PROCEDURE — 85025 COMPLETE CBC W/AUTO DIFF WBC: CPT | Performed by: INTERNAL MEDICINE

## 2023-05-27 RX ORDER — DIPHENHYDRAMINE HYDROCHLORIDE 50 MG/ML
25 INJECTION INTRAMUSCULAR; INTRAVENOUS ONCE
Status: COMPLETED | OUTPATIENT
Start: 2023-05-27 | End: 2023-05-27

## 2023-05-27 RX ORDER — DIPHENHYDRAMINE HYDROCHLORIDE 50 MG/ML
25 INJECTION INTRAMUSCULAR; INTRAVENOUS ONCE
Status: DISCONTINUED | OUTPATIENT
Start: 2023-05-27 | End: 2023-05-27

## 2023-05-27 RX ADMIN — Medication 10 ML: at 20:24

## 2023-05-27 RX ADMIN — OXYCODONE 5 MG: 5 TABLET ORAL at 06:35

## 2023-05-27 RX ADMIN — MIDODRINE HYDROCHLORIDE 10 MG: 5 TABLET ORAL at 22:19

## 2023-05-27 RX ADMIN — LOSARTAN POTASSIUM 25 MG: 25 TABLET, FILM COATED ORAL at 09:40

## 2023-05-27 RX ADMIN — OXYCODONE 5 MG: 5 TABLET ORAL at 22:23

## 2023-05-27 RX ADMIN — ALLOPURINOL 500 MG: 300 TABLET ORAL at 09:40

## 2023-05-27 RX ADMIN — DIPHENHYDRAMINE HYDROCHLORIDE 25 MG: 50 INJECTION, SOLUTION INTRAMUSCULAR; INTRAVENOUS at 21:23

## 2023-05-27 RX ADMIN — OXYCODONE 5 MG: 5 TABLET ORAL at 14:19

## 2023-05-27 RX ADMIN — INSULIN LISPRO 12 UNITS: 100 INJECTION, SOLUTION INTRAVENOUS; SUBCUTANEOUS at 09:40

## 2023-05-27 RX ADMIN — INSULIN GLARGINE 35 UNITS: 100 INJECTION, SOLUTION SUBCUTANEOUS at 20:23

## 2023-05-27 RX ADMIN — MIDODRINE HYDROCHLORIDE 10 MG: 5 TABLET ORAL at 06:28

## 2023-05-27 RX ADMIN — GABAPENTIN 300 MG: 300 CAPSULE ORAL at 09:41

## 2023-05-27 RX ADMIN — Medication 10 ML: at 09:41

## 2023-05-27 RX ADMIN — SENNOSIDES AND DOCUSATE SODIUM 2 TABLET: 50; 8.6 TABLET ORAL at 09:40

## 2023-05-27 RX ADMIN — EMPAGLIFLOZIN 25 MG: 25 TABLET, FILM COATED ORAL at 09:40

## 2023-05-27 RX ADMIN — DIPHENHYDRAMINE HYDROCHLORIDE 25 MG: 50 INJECTION, SOLUTION INTRAMUSCULAR; INTRAVENOUS at 18:22

## 2023-05-27 RX ADMIN — GABAPENTIN 300 MG: 300 CAPSULE ORAL at 16:42

## 2023-05-27 RX ADMIN — SENNOSIDES AND DOCUSATE SODIUM 2 TABLET: 50; 8.6 TABLET ORAL at 20:24

## 2023-05-27 RX ADMIN — FUROSEMIDE 40 MG: 40 TABLET ORAL at 09:40

## 2023-05-27 RX ADMIN — METOPROLOL SUCCINATE 25 MG: 25 TABLET, EXTENDED RELEASE ORAL at 09:40

## 2023-05-27 RX ADMIN — GABAPENTIN 300 MG: 300 CAPSULE ORAL at 20:23

## 2023-05-27 RX ADMIN — Medication 10 ML: at 00:57

## 2023-05-27 RX ADMIN — HYDROXYZINE HYDROCHLORIDE 25 MG: 25 TABLET, FILM COATED ORAL at 14:19

## 2023-05-27 RX ADMIN — INSULIN LISPRO 8 UNITS: 100 INJECTION, SOLUTION INTRAVENOUS; SUBCUTANEOUS at 18:22

## 2023-05-27 RX ADMIN — INSULIN LISPRO 12 UNITS: 100 INJECTION, SOLUTION INTRAVENOUS; SUBCUTANEOUS at 14:21

## 2023-05-27 RX ADMIN — MIRTAZAPINE 15 MG: 15 TABLET, FILM COATED ORAL at 20:23

## 2023-05-27 RX ADMIN — MIDODRINE HYDROCHLORIDE 10 MG: 5 TABLET ORAL at 14:20

## 2023-05-27 RX ADMIN — INSULIN LISPRO 5 UNITS: 100 INJECTION, SOLUTION INTRAVENOUS; SUBCUTANEOUS at 21:23

## 2023-05-27 RX ADMIN — Medication 10 ML: at 00:35

## 2023-05-27 RX ADMIN — CITALOPRAM HYDROBROMIDE 10 MG: 20 TABLET ORAL at 09:40

## 2023-05-27 NOTE — PROGRESS NOTES
Hematology/Oncology Inpatient Progress Note    PATIENT NAME: Nieves Mc  : 1975  MRN: [id number]    CHIEF COMPLAINT:     HISTORY OF PRESENT ILLNESS:      Nieves Mc is a 47 y.o. female who presented to [facility] on 2023 with complaints of nausea, vomiting, coughing and chills during a platelet transfusion.  Past medical history of CML on ponatinib, cardiomyopathy, chronic pain, uncontrolled type 1 diabetes, chronic anemia, insomnia, anxiety and depression, pulmonary embolism on chronic anticoagulation with Xarelto, medical noncompliance.       Evaluation in the ED: Patient reported a fall 2 to 3 weeks ago during which she hit the right side of her head a CT of the head was done showing bilateral subdural hygromas suggesting remote subdural hemorrhage; local mass effect with sulcal and effacement and decreased of the size of lateral ventricles but no evidence of herniation; no acute intracranial hemorrhage.  The patient was admitted for further treatment of her pancytopenia and remote subdural hemorrhage with plans for pretreatment with IV Solu-Medrol, Tylenol and Benadryl prior to transfusing another unit of platelets.        23  Hematology/Oncology was consulted on a patient known to us and followed in the office by Dr. Lerma for her diagnosis of CML.  Patient initially presented in  when she was seen in consultation while hospitalized.  At that time the patient was on imatinib.  In  she had progressive thrombocytosis and was transition to nilotinib.  After that she was lost to follow-up until she was once again seen during a hospitalization in .  She was continued on nilotinib and hydroxyurea.  In 2022 she had once again been lost to follow-up for 3 months when she presented in the office after not taking nilotinib during that time.  She had shortness of breath and cough for which a 2D echocardiogram was done and showed a reduced EF of 41 to 45%.  Due to  cardiomyopathy she was transitioned to imatinib and hydroxyurea.  In November 2022 bone marrow biopsy showed her to be 18 accelerated phase CML and that the imatinib was not controlling her malignancy well and she was initiated on ponatinib 45 mg p.o. daily.  She had significant body pain with this dose and for that reason was reduced to 30 mg p.o. daily.  She was once again lost to follow-up for approximately 6 weeks during that time she self reduced to ponatinib 15 mg daily.  She was seen in the office on 5/17/2023 at which time her platelet count was noted to be 8000 and she was severely neutropenic.  Her ponatinib was held and she was sent to the ambulatory care unit for 2 units of platelets to be transfused and a platelet level to be checked posttransfusion for further planning.     Patient has a history of CML on ponatinib.  Patient has been noncompliant with lab evaluations as recommended.  She has been taking 50 mg of ponatinib.  She comes to the office with significant pancytopenia, severe neutropenia, platelets of 7000 and anemia.  Patient was sent to the ambulatory care for platelet transfusions, she developed chills nausea but did receive platelets.  Repeat platelet check was no significantly changed at 8000 for that reason patient was sent to the ER to be admitted to the hospital.  She has a history of falls approximately 2 weeks earlier.  While in the ER she had CT of the head which showed old subdural hematoma.  Patient denies nausea vomiting fevers or chills.  She has not been able to come to the office due to social issues, transportation issues     5/26/2023 - patient was given 2 units of HLA matched platelets.   5/27/2023 - plt count improved to 33  He/She  has a past medical history of Bone pain, COVID-19 virus infection (01/12/2022), Diabetes mellitus, Extremity pain, Leg pain, Migraine, Pulmonary embolism, and Vision loss.     PCP: Alida Latham APRN    Subjective    Patient has had  "epistaxis, significant. Improved now      MEDICATIONS:    Scheduled Meds:  allopurinol, 500 mg, Oral, Daily  citalopram, 10 mg, Oral, Daily  empagliflozin, 25 mg, Oral, Daily  furosemide, 40 mg, Oral, Daily  gabapentin, 300 mg, Oral, TID  insulin glargine, 1-200 Units, Subcutaneous, Nightly - Glucommander  insulin lispro, 1-200 Units, Subcutaneous, 4x Daily With Meals & Nightly  losartan, 25 mg, Oral, Daily  metoprolol succinate XL, 25 mg, Oral, Daily  midodrine, 10 mg, Oral, Q8H  mirtazapine, 15 mg, Oral, Nightly  senna-docusate sodium, 2 tablet, Oral, BID  sodium chloride, 10 mL, Intravenous, Q12H       Continuous Infusions:  pain,        PRN Meds:  •  acetaminophen **OR** acetaminophen **OR** acetaminophen  •  ALPRAZolam  •  senna-docusate sodium **AND** polyethylene glycol **AND** bisacodyl **AND** bisacodyl  •  Calcium Replacement - Follow Nurse / BPA Driven Protocol  •  dextrose  •  dextrose  •  glucagon (human recombinant)  •  hydrocortisone  •  hydrOXYzine  •  insulin lispro  •  Magnesium Standard Dose Replacement - Follow Nurse / BPA Driven Protocol  •  melatonin  •  ondansetron **OR** ondansetron  •  oxyCODONE  •  Phosphorus Replacement - Follow Nurse / BPA Driven Protocol  •  Potassium Replacement - Follow Nurse / BPA Driven Protocol  •  prochlorperazine  •  sodium chloride  •  sodium chloride     ALLERGIES:  No Known Allergies    Objective    VITALS:   /93   Pulse 95   Temp 98.1 °F (36.7 °C) (Oral)   Resp 20   Ht 162.6 cm (64\")   Wt 57.8 kg (127 lb 6.8 oz)   LMP 05/25/2022 (Approximate)   SpO2 97%   BMI 21.87 kg/m²     PHYSICAL EXAM: (performed by MD)  Physical Exam  Constitutional:       Appearance: She is ill-appearing.   HENT:      Head: Normocephalic and atraumatic.   Eyes:      Pupils: Pupils are equal, round, and reactive to light.   Cardiovascular:      Rate and Rhythm: Normal rate and regular rhythm.      Pulses: Normal pulses.      Heart sounds: No murmur heard.  Pulmonary:      " Effort: Pulmonary effort is normal.      Breath sounds: Normal breath sounds.   Abdominal:      General: There is no distension.      Palpations: Abdomen is soft. There is no mass.      Tenderness: There is no abdominal tenderness.   Musculoskeletal:         General: Normal range of motion.      Cervical back: Normal range of motion.   Skin:     General: Skin is warm.      Coloration: Skin is pale.   Neurological:      General: No focal deficit present.      Mental Status: She is alert.   Psychiatric:         Mood and Affect: Mood normal.       I have reexamined the patient and the results are consistent with the previously documented exam. Dolores Mukherjee MD     RECENT LABS:  Lab Results (last 24 hours)     Procedure Component Value Units Date/Time    POC Glucose Once [[id number]]  (Abnormal) Collected: 05/27/23 1119    Specimen: Blood Updated: 05/27/23 1120     Glucose 234 mg/dL      Comment: Serial Number: [id number]:  [id number]       Manual Differential [[id number]]  (Abnormal) Collected: 05/27/23 0851    Specimen: Blood Updated: 05/27/23 1013     Neutrophil % 9.0 %      Lymphocyte % 78.0 %      Monocyte % 6.0 %      Bands %  3.0 %      Metamyelocyte % 1.0 %      Atypical Lymphocyte % 3.0 %      Neutrophils Absolute 0.13 10*3/mm3      Lymphocytes Absolute 0.89 10*3/mm3      Monocytes Absolute 0.07 10*3/mm3      Anisocytosis Slight/1+     WBC Morphology Normal     Platelet Estimate Decreased    Narrative:      Reviewed by Pathologist within the past 30 days on 05.18.2023.      CBC & Differential [[id number]]  (Abnormal) Collected: 05/27/23 0851    Specimen: Blood Updated: 05/27/23 1013    Narrative:      The following orders were created for panel order CBC & Differential.  Procedure                               Abnormality         Status                     ---------                               -----------         ------                     CBC Auto Differential[[id number]]        Abnormal            Final  result               Scan Slide[[id number]]                                       Final result                 Please view results for these tests on the individual orders.    Scan Slide [[id number]] Collected: 05/27/23 0851    Specimen: Blood Updated: 05/27/23 1013     Scan Slide --     Comment: See Manual Differential Results       CBC Auto Differential [[id number]]  (Abnormal) Collected: 05/27/23 0851    Specimen: Blood Updated: 05/27/23 1013     WBC 1.10 10*3/mm3      RBC 3.00 10*6/mm3      Hemoglobin 8.3 g/dL      Hematocrit 25.2 %      MCV 84.0 fL      MCH 27.6 pg      MCHC 32.9 g/dL      RDW 18.0 %      RDW-SD 52.9 fl      MPV 8.4 fL      Platelets 33 10*3/mm3     Narrative:      The previously reported component NRBC is no longer being reported. Previous result was 0.4 /100 WBC (Reference Range: 0.0-0.2 /100 WBC) on 5/27/2023 at 0938 EDT.    POC Glucose Once [[id number]]  (Abnormal) Collected: 05/27/23 0715    Specimen: Blood Updated: 05/27/23 0717     Glucose 300 mg/dL      Comment: Serial Number: [id number]:  [id number]       POC Glucose Once [[id number]]  (Abnormal) Collected: 05/27/23 0606    Specimen: Blood Updated: 05/27/23 0608     Glucose 366 mg/dL      Comment: Serial Number: [id number]:  [id number]       POC Glucose Once [[id number]]  (Abnormal) Collected: 05/26/23 1940    Specimen: Blood Updated: 05/26/23 1941     Glucose 129 mg/dL      Comment: Serial Number: [id number]:  [id number]       POC Glucose Once [[id number]]  (Abnormal) Collected: 05/26/23 1622    Specimen: Blood Updated: 05/26/23 1623     Glucose 299 mg/dL      Comment: Serial Number: [id number]:  [id number]       HLA Antibody Identification Class I High Resolution - Miscellaneous Test [[id number]] Collected: 05/19/23 1151    Specimen: Blood Updated: 05/26/23 1623     Miscellaneous Lab Test Result See attached report        IMAGING REVIEWED:  XR Ribs Bilateral 3 View    Result Date: 5/25/2023  No acute findings.  Electronically Signed: Hari Ray  5/25/2023 11:40 PM EDT  Workstation ID: [id number]      Assessment & Plan      ASSESSMENT:    1. Pancytopenia: There is progressive improvement in the blood counts though very slowly.  s/p Transfusion HLA matched platelets now improvement in plt count to 33  2. Accelerated phase CML: She will remain off of the ponatininb for now.   3. Alloimmunization: No need for transfusion.  She does not respond to platelet transfusions but has responded to HLA typed platelets. No transfusion for now monitor for bleeding.  4. Bilateral subdural hematomas: No suggestion of further bleeding.   5. Back pain. Increased the dose of the oxycodone.      PLANS    1. Continue to monitor daily CBCs  2. Transfuse HLA matched platelets as needed.  3. Anticipate discharge soon if she continues to improve hematologically, monitor for now

## 2023-05-27 NOTE — PLAN OF CARE
Goal Outcome Evaluation:  Plan of Care Reviewed With: patient        Progress: improving    Patient received 2 units of Platelets on my shift. VS has been stable. Will continue to monitor.

## 2023-05-27 NOTE — PROGRESS NOTES
[facility]     Progress Note    Patient Name: Nieves Mc  : 1975  MRN: [id number]  Primary Care Physician:  Aliad Latham APRN  Date of admission: 2023  Service date and time: 23 16:03 EDT  Subjective   Subjective     Chief Complaint: low platelets, platelet transfusion reaction    Patient is complaining of epistaxis today    Objective   Objective     Vitals:   Temp:  [97 °F (36.1 °C)-98.9 °F (37.2 °C)] 98.1 °F (36.7 °C)  Heart Rate:  [] 112  Resp:  [12-20] 20  BP: ()/(48-93) 102/66  Flow (L/min):  [1] 1  Physical Exam    Constitutional: Awake, alert   Eyes: PERRLA, sclerae anicteric, no conjunctival injection   HENT: NCAT, mucous membranes moist   Neck: Supple, no thyromegaly, no lymphadenopathy, trachea midline   Respiratory: Clear to auscultation bilaterally, nonlabored respirations    Cardiovascular: RRR, no murmurs, rubs, or gallops, palpable pedal pulses bilaterally   Gastrointestinal: Positive bowel sounds, soft, nontender, nondistended   Musculoskeletal: No bilateral ankle edema, no clubbing or cyanosis to extremities   Psychiatric: Appropriate affect, cooperative   Neurologic: Oriented x 3, strength symmetric in all extremities, Cranial Nerves grossly intact to confrontation, speech clear   Skin: No rashes     Result Review    Result Review:  I have personally reviewed the results from the time of this admission to 2023 16:03 EDT and agree with these findings:  []  Laboratory list / accordion  []  Microbiology  []  Radiology  []  EKG/Telemetry   []  Cardiology/Vascular   []  Pathology  []  Old records  []  Other:  Most notable findings include: Platelet count at 33    Assessment & Plan   Assessment / Plan       Active Hospital Problems:  Active Hospital Problems    Diagnosis    • **Pancytopenia    • Bilateral subdural hematomas    • Lumbar radiculopathy    • Traumatic subdural hemorrhage without loss of consciousness    • Chronic pain    • NICM  (nonischemic cardiomyopathy)    • Type 2 diabetes mellitus with diabetic polyneuropathy, with long-term current use of insulin    • Depression with anxiety    • History of pulmonary embolism    • Medically noncompliant    • CML (chronic myelocytic leukemia)         **Pancytopenia      • Bilateral subdural hematomas     • Lumbar radiculopathy     • Traumatic subdural hemorrhage without loss of consciousness     • Chronic pain     • NICM (nonischemic cardiomyopathy)     • Type 2 diabetes mellitus with diabetic polyneuropathy, with long-term current use of insulin     • Depression with anxiety     • History of pulmonary embolism     • Medically noncompliant     • CML (chronic myelocytic leukemia      Epistaxis  Nose packing   Follow up CBC in AM        DVT prophylaxis:  Mechanical DVT prophylaxis orders are present.    CODE STATUS:   Medical Intervention Limits: NO intubation (DNI); NO artificial nutrition  Level Of Support Discussed With: Patient; Next of Kin (If No Surrogate)  Code Status (Patient has no pulse and is not breathing): CPR (Attempt to Resuscitate)  Medical Interventions (Patient has pulse or is breathing): Limited Support  Release to patient: Routine Release    Disposition:  I expect patient to be discharged 3 days.    Waqas Oneal MD

## 2023-05-28 LAB
ABO GROUP BLD: NORMAL
ANION GAP SERPL CALCULATED.3IONS-SCNC: 12 MMOL/L (ref 5–15)
ANISOCYTOSIS BLD QL: ABNORMAL
BLD GP AB SCN SERPL QL: NEGATIVE
BUN SERPL-MCNC: 32 MG/DL (ref 6–20)
BUN/CREAT SERPL: 39 (ref 7–25)
CALCIUM SPEC-SCNC: 9.6 MG/DL (ref 8.6–10.5)
CHLORIDE SERPL-SCNC: 101 MMOL/L (ref 98–107)
CO2 SERPL-SCNC: 26 MMOL/L (ref 22–29)
CREAT SERPL-MCNC: 0.82 MG/DL (ref 0.57–1)
DEPRECATED RDW RBC AUTO: 50.8 FL (ref 37–54)
EGFRCR SERPLBLD CKD-EPI 2021: 88.9 ML/MIN/1.73
ERYTHROCYTE [DISTWIDTH] IN BLOOD BY AUTOMATED COUNT: 17.5 % (ref 12.3–15.4)
GLUCOSE BLDC GLUCOMTR-MCNC: 133 MG/DL (ref 70–105)
GLUCOSE BLDC GLUCOMTR-MCNC: 166 MG/DL (ref 70–105)
GLUCOSE BLDC GLUCOMTR-MCNC: 273 MG/DL (ref 70–105)
GLUCOSE BLDC GLUCOMTR-MCNC: 530 MG/DL (ref 70–105)
GLUCOSE SERPL-MCNC: 328 MG/DL (ref 65–99)
HCT VFR BLD AUTO: 21.3 % (ref 34–46.6)
HGB BLD-MCNC: 7.1 G/DL (ref 12–15.9)
LYMPHOCYTES # BLD MANUAL: 1.58 10*3/MM3 (ref 0.7–3.1)
LYMPHOCYTES NFR BLD MANUAL: 1 % (ref 5–12)
MCH RBC QN AUTO: 27.5 PG (ref 26.6–33)
MCHC RBC AUTO-ENTMCNC: 33.3 G/DL (ref 31.5–35.7)
MCV RBC AUTO: 82.4 FL (ref 79–97)
MONOCYTES # BLD: 0.02 10*3/MM3 (ref 0.1–0.9)
NEUTROPHILS # BLD AUTO: 0.1 10*3/MM3 (ref 1.7–7)
NEUTROPHILS NFR BLD MANUAL: 6 % (ref 42.7–76)
PLATELET # BLD AUTO: 27 10*3/MM3 (ref 140–450)
PMV BLD AUTO: 7 FL (ref 6–12)
POTASSIUM SERPL-SCNC: 4.7 MMOL/L (ref 3.5–5.2)
RBC # BLD AUTO: 2.58 10*6/MM3 (ref 3.77–5.28)
RH BLD: POSITIVE
SCAN SLIDE: NORMAL
SMALL PLATELETS BLD QL SMEAR: ABNORMAL
SODIUM SERPL-SCNC: 139 MMOL/L (ref 136–145)
T&S EXPIRATION DATE: NORMAL
VARIANT LYMPHS NFR BLD MANUAL: 93 % (ref 19.6–45.3)
WBC MORPH BLD: NORMAL
WBC NRBC COR # BLD: 1.7 10*3/MM3 (ref 3.4–10.8)

## 2023-05-28 PROCEDURE — 36430 TRANSFUSION BLD/BLD COMPNT: CPT

## 2023-05-28 PROCEDURE — 86850 RBC ANTIBODY SCREEN: CPT | Performed by: INTERNAL MEDICINE

## 2023-05-28 PROCEDURE — 85025 COMPLETE CBC W/AUTO DIFF WBC: CPT | Performed by: INTERNAL MEDICINE

## 2023-05-28 PROCEDURE — 63710000001 INSULIN LISPRO (HUMAN) PER 5 UNITS

## 2023-05-28 PROCEDURE — 86900 BLOOD TYPING SEROLOGIC ABO: CPT | Performed by: INTERNAL MEDICINE

## 2023-05-28 PROCEDURE — 63710000001 INSULIN GLARGINE PER 5 UNITS

## 2023-05-28 PROCEDURE — 82948 REAGENT STRIP/BLOOD GLUCOSE: CPT

## 2023-05-28 PROCEDURE — 25010000002 DIPHENHYDRAMINE PER 50 MG: Performed by: INTERNAL MEDICINE

## 2023-05-28 PROCEDURE — 86902 BLOOD TYPE ANTIGEN DONOR EA: CPT

## 2023-05-28 PROCEDURE — 63710000001 PREDNISONE PER 1 MG: Performed by: INTERNAL MEDICINE

## 2023-05-28 PROCEDURE — 99232 SBSQ HOSP IP/OBS MODERATE 35: CPT | Performed by: INTERNAL MEDICINE

## 2023-05-28 PROCEDURE — 86901 BLOOD TYPING SEROLOGIC RH(D): CPT | Performed by: INTERNAL MEDICINE

## 2023-05-28 PROCEDURE — 80048 BASIC METABOLIC PNL TOTAL CA: CPT | Performed by: INTERNAL MEDICINE

## 2023-05-28 PROCEDURE — 86900 BLOOD TYPING SEROLOGIC ABO: CPT

## 2023-05-28 PROCEDURE — 85007 BL SMEAR W/DIFF WBC COUNT: CPT | Performed by: INTERNAL MEDICINE

## 2023-05-28 PROCEDURE — 86922 COMPATIBILITY TEST ANTIGLOB: CPT

## 2023-05-28 PROCEDURE — P9040 RBC LEUKOREDUCED IRRADIATED: HCPCS

## 2023-05-28 RX ORDER — DIPHENHYDRAMINE HYDROCHLORIDE 50 MG/ML
25 INJECTION INTRAMUSCULAR; INTRAVENOUS EVERY 6 HOURS PRN
Status: COMPLETED | OUTPATIENT
Start: 2023-05-28 | End: 2023-05-28

## 2023-05-28 RX ORDER — PREDNISONE 20 MG/1
40 TABLET ORAL ONCE
Status: COMPLETED | OUTPATIENT
Start: 2023-05-28 | End: 2023-05-28

## 2023-05-28 RX ADMIN — LOSARTAN POTASSIUM 25 MG: 25 TABLET, FILM COATED ORAL at 08:35

## 2023-05-28 RX ADMIN — Medication 10 ML: at 21:24

## 2023-05-28 RX ADMIN — CITALOPRAM HYDROBROMIDE 10 MG: 20 TABLET ORAL at 08:35

## 2023-05-28 RX ADMIN — OXYCODONE 5 MG: 5 TABLET ORAL at 08:34

## 2023-05-28 RX ADMIN — GABAPENTIN 300 MG: 300 CAPSULE ORAL at 21:22

## 2023-05-28 RX ADMIN — MIRTAZAPINE 15 MG: 15 TABLET, FILM COATED ORAL at 21:23

## 2023-05-28 RX ADMIN — OXYCODONE 5 MG: 5 TABLET ORAL at 13:28

## 2023-05-28 RX ADMIN — DIPHENHYDRAMINE HYDROCHLORIDE 25 MG: 50 INJECTION, SOLUTION INTRAMUSCULAR; INTRAVENOUS at 14:38

## 2023-05-28 RX ADMIN — INSULIN GLARGINE 35 UNITS: 100 INJECTION, SOLUTION SUBCUTANEOUS at 21:22

## 2023-05-28 RX ADMIN — SENNOSIDES AND DOCUSATE SODIUM 2 TABLET: 50; 8.6 TABLET ORAL at 08:35

## 2023-05-28 RX ADMIN — Medication 10 ML: at 08:38

## 2023-05-28 RX ADMIN — FUROSEMIDE 40 MG: 40 TABLET ORAL at 08:35

## 2023-05-28 RX ADMIN — INSULIN LISPRO 12 UNITS: 100 INJECTION, SOLUTION INTRAVENOUS; SUBCUTANEOUS at 13:24

## 2023-05-28 RX ADMIN — MIDODRINE HYDROCHLORIDE 10 MG: 5 TABLET ORAL at 08:35

## 2023-05-28 RX ADMIN — INSULIN LISPRO 23 UNITS: 100 INJECTION, SOLUTION INTRAVENOUS; SUBCUTANEOUS at 21:21

## 2023-05-28 RX ADMIN — METOPROLOL SUCCINATE 25 MG: 25 TABLET, EXTENDED RELEASE ORAL at 08:35

## 2023-05-28 RX ADMIN — ALPRAZOLAM 0.5 MG: 0.5 TABLET ORAL at 21:32

## 2023-05-28 RX ADMIN — SENNOSIDES AND DOCUSATE SODIUM 2 TABLET: 50; 8.6 TABLET ORAL at 21:23

## 2023-05-28 RX ADMIN — GABAPENTIN 300 MG: 300 CAPSULE ORAL at 08:35

## 2023-05-28 RX ADMIN — EMPAGLIFLOZIN 25 MG: 25 TABLET, FILM COATED ORAL at 08:35

## 2023-05-28 RX ADMIN — ALLOPURINOL 500 MG: 300 TABLET ORAL at 08:35

## 2023-05-28 RX ADMIN — OXYCODONE 5 MG: 5 TABLET ORAL at 21:23

## 2023-05-28 RX ADMIN — PREDNISONE 40 MG: 20 TABLET ORAL at 14:38

## 2023-05-28 RX ADMIN — OXYCODONE 5 MG: 5 TABLET ORAL at 03:20

## 2023-05-28 NOTE — PROGRESS NOTES
Hematology/Oncology Inpatient Progress Note    PATIENT NAME: Nieves Mc  : 1975  MRN: [id number]    CHIEF COMPLAINT:     HISTORY OF PRESENT ILLNESS:      Nieves Mc is a 47 y.o. female who presented to [facility] on 2023 with complaints of nausea, vomiting, coughing and chills during a platelet transfusion.  Past medical history of CML on ponatinib, cardiomyopathy, chronic pain, uncontrolled type 1 diabetes, chronic anemia, insomnia, anxiety and depression, pulmonary embolism on chronic anticoagulation with Xarelto, medical noncompliance.       Evaluation in the ED: Patient reported a fall 2 to 3 weeks ago during which she hit the right side of her head a CT of the head was done showing bilateral subdural hygromas suggesting remote subdural hemorrhage; local mass effect with sulcal and effacement and decreased of the size of lateral ventricles but no evidence of herniation; no acute intracranial hemorrhage.  The patient was admitted for further treatment of her pancytopenia and remote subdural hemorrhage with plans for pretreatment with IV Solu-Medrol, Tylenol and Benadryl prior to transfusing another unit of platelets.        23  Hematology/Oncology was consulted on a patient known to us and followed in the office by Dr. Lerma for her diagnosis of CML.  Patient initially presented in  when she was seen in consultation while hospitalized.  At that time the patient was on imatinib.  In  she had progressive thrombocytosis and was transition to nilotinib.  After that she was lost to follow-up until she was once again seen during a hospitalization in .  She was continued on nilotinib and hydroxyurea.  In 2022 she had once again been lost to follow-up for 3 months when she presented in the office after not taking nilotinib during that time.  She had shortness of breath and cough for which a 2D echocardiogram was done and showed a reduced EF of 41 to 45%.  Due to  cardiomyopathy she was transitioned to imatinib and hydroxyurea.  In November 2022 bone marrow biopsy showed her to be 18 accelerated phase CML and that the imatinib was not controlling her malignancy well and she was initiated on ponatinib 45 mg p.o. daily.  She had significant body pain with this dose and for that reason was reduced to 30 mg p.o. daily.  She was once again lost to follow-up for approximately 6 weeks during that time she self reduced to ponatinib 15 mg daily.  She was seen in the office on 5/17/2023 at which time her platelet count was noted to be 8000 and she was severely neutropenic.  Her ponatinib was held and she was sent to the ambulatory care unit for 2 units of platelets to be transfused and a platelet level to be checked posttransfusion for further planning.     Patient has a history of CML on ponatinib.  Patient has been noncompliant with lab evaluations as recommended.  She has been taking 50 mg of ponatinib.  She comes to the office with significant pancytopenia, severe neutropenia, platelets of 7000 and anemia.  Patient was sent to the ambulatory care for platelet transfusions, she developed chills nausea but did receive platelets.  Repeat platelet check was no significantly changed at 8000 for that reason patient was sent to the ER to be admitted to the hospital.  She has a history of falls approximately 2 weeks earlier.  While in the ER she had CT of the head which showed old subdural hematoma.  Patient denies nausea vomiting fevers or chills.  She has not been able to come to the office due to social issues, transportation issues     5/26/2023 - patient was given 2 units of HLA matched platelets.   5/27/2023 - plt count improved to 33  He/She  has a past medical history of Bone pain, COVID-19 virus infection (01/12/2022), Diabetes mellitus, Extremity pain, Leg pain, Migraine, Pulmonary embolism, and Vision loss.     PCP: Alida Latham APRN    Subjective    Patient has had  "epistaxis again yesterday afternoon.       MEDICATIONS:    Scheduled Meds:  allopurinol, 500 mg, Oral, Daily  citalopram, 10 mg, Oral, Daily  empagliflozin, 25 mg, Oral, Daily  furosemide, 40 mg, Oral, Daily  gabapentin, 300 mg, Oral, TID  insulin glargine, 1-200 Units, Subcutaneous, Nightly - Glucommander  insulin lispro, 1-200 Units, Subcutaneous, 4x Daily With Meals & Nightly  losartan, 25 mg, Oral, Daily  metoprolol succinate XL, 25 mg, Oral, Daily  midodrine, 10 mg, Oral, Q8H  mirtazapine, 15 mg, Oral, Nightly  senna-docusate sodium, 2 tablet, Oral, BID  sodium chloride, 10 mL, Intravenous, Q12H       Continuous Infusions:  pain,        PRN Meds:  •  acetaminophen **OR** acetaminophen **OR** acetaminophen  •  ALPRAZolam  •  senna-docusate sodium **AND** polyethylene glycol **AND** bisacodyl **AND** bisacodyl  •  Calcium Replacement - Follow Nurse / BPA Driven Protocol  •  dextrose  •  dextrose  •  glucagon (human recombinant)  •  hydrocortisone  •  hydrOXYzine  •  insulin lispro  •  Magnesium Standard Dose Replacement - Follow Nurse / BPA Driven Protocol  •  melatonin  •  ondansetron **OR** ondansetron  •  oxyCODONE  •  Phosphorus Replacement - Follow Nurse / BPA Driven Protocol  •  Potassium Replacement - Follow Nurse / BPA Driven Protocol  •  prochlorperazine  •  sodium chloride  •  sodium chloride     ALLERGIES:  No Known Allergies    Objective    VITALS:   BP 95/46   Pulse 105   Temp 98.4 °F (36.9 °C) (Oral)   Resp 15   Ht 162.6 cm (64\")   Wt 57.8 kg (127 lb 6.8 oz)   LMP 05/25/2022 (Approximate)   SpO2 97%   BMI 21.87 kg/m²     PHYSICAL EXAM: (performed by MD)  Physical Exam  Constitutional:       Appearance: Normal appearance. She is ill-appearing.   HENT:      Head: Normocephalic and atraumatic.   Eyes:      Pupils: Pupils are equal, round, and reactive to light.   Cardiovascular:      Rate and Rhythm: Normal rate and regular rhythm.      Pulses: Normal pulses.      Heart sounds: No murmur " heard.  Pulmonary:      Effort: Pulmonary effort is normal.      Breath sounds: Normal breath sounds.   Abdominal:      General: There is no distension.      Palpations: Abdomen is soft. There is no mass.      Tenderness: There is no abdominal tenderness.   Musculoskeletal:         General: Normal range of motion.      Cervical back: Normal range of motion and neck supple.   Skin:     General: Skin is warm.      Coloration: Skin is pale.   Neurological:      General: No focal deficit present.      Mental Status: She is alert.   Psychiatric:         Mood and Affect: Mood normal.       I have reexamined the patient and the results are consistent with the previously documented exam. Dolores Mukherjee MD     RECENT LABS:  Lab Results (last 24 hours)     Procedure Component Value Units Date/Time    POC Glucose Once [[id number]]  (Abnormal) Collected: 05/28/23 1106    Specimen: Blood Updated: 05/28/23 1107     Glucose 273 mg/dL      Comment: Serial Number: [id number]:  [id number]       Basic Metabolic Panel [[id number]]  (Abnormal) Collected: 05/28/23 0903    Specimen: Blood Updated: 05/28/23 1008     Glucose 328 mg/dL      BUN 32 mg/dL      Creatinine 0.82 mg/dL      Sodium 139 mmol/L      Potassium 4.7 mmol/L      Chloride 101 mmol/L      CO2 26.0 mmol/L      Calcium 9.6 mg/dL      BUN/Creatinine Ratio 39.0     Anion Gap 12.0 mmol/L      eGFR 88.9 mL/min/1.73     Narrative:      GFR Normal >60  Chronic Kidney Disease <60  Kidney Failure <15      Manual Differential [[id number]]  (Abnormal) Collected: 05/28/23 0903    Specimen: Blood Updated: 05/28/23 0955     Neutrophil % 6.0 %      Lymphocyte % 93.0 %      Monocyte % 1.0 %      Neutrophils Absolute 0.10 10*3/mm3      Lymphocytes Absolute 1.58 10*3/mm3      Monocytes Absolute 0.02 10*3/mm3      Anisocytosis Slight/1+     WBC Morphology Normal     Platelet Estimate Decreased    Narrative:      Reviewed by Pathologist within the past 30 days on 05.18.2023.      CBC &  Differential [[id number]]  (Abnormal) Collected: 05/28/23 0903    Specimen: Blood Updated: 05/28/23 0955    Narrative:      The following orders were created for panel order CBC & Differential.  Procedure                               Abnormality         Status                     ---------                               -----------         ------                     CBC Auto Differential[[id number]]        Abnormal            Final result               Scan Slide[[id number]]                                       Final result                 Please view results for these tests on the individual orders.    Scan Slide [[id number]] Collected: 05/28/23 0903    Specimen: Blood Updated: 05/28/23 0955     Scan Slide --     Comment: See Manual Differential Results       CBC Auto Differential [[id number]]  (Abnormal) Collected: 05/28/23 0903    Specimen: Blood Updated: 05/28/23 0955     WBC 1.70 10*3/mm3      RBC 2.58 10*6/mm3      Hemoglobin 7.1 g/dL      Hematocrit 21.3 %      MCV 82.4 fL      MCH 27.5 pg      MCHC 33.3 g/dL      RDW 17.5 %      RDW-SD 50.8 fl      MPV 7.0 fL      Platelets 27 10*3/mm3     Narrative:      The previously reported component NRBC is no longer being reported. Previous result was 0.2 /100 WBC (Reference Range: 0.0-0.2 /100 WBC) on 5/28/2023 at 0919 EDT.    POC Glucose Once [[id number]]  (Abnormal) Collected: 05/28/23 0722    Specimen: Blood Updated: 05/28/23 0723     Glucose 166 mg/dL      Comment: Serial Number: [id number]:  [id number]       POC Glucose Once [[id number]]  (Abnormal) Collected: 05/27/23 2224    Specimen: Blood Updated: 05/27/23 2226     Glucose 174 mg/dL      Comment: Serial Number: [id number]:  [id number]       POC Glucose Once [[id number]]  (Abnormal) Collected: 05/27/23 2024    Specimen: Blood Updated: 05/27/23 2029     Glucose 272 mg/dL      Comment: Serial Number: [id number]:  [id number]           IMAGING REVIEWED:  No radiology results for the last  day    Assessment & Plan      ASSESSMENT:    1. Pancytopenia: There is progressive improvement in the blood counts though very slowly.  s/p Transfusion HLA matched platelets improvement in plt count to 33, now at 27, hb down to 7.1, increasingly symptomatic with fatigue, shortness of breath. Plan for transfusion of one unit.   2. Accelerated phase CML: She will remain off of the ponatininb for now.   3. Alloimmunization: No need for transfusion currently.  She does not respond to platelet transfusions but has responded to HLA typed platelets. No transfusion for now monitor for bleeding.  4. Bilateral subdural hematomas: No suggestion of further bleeding.   5. Back pain. Increased the dose of the oxycodone.      PLANS    1. Continue to monitor daily CBCs  2. Transfuse HLA matched platelets as needed.  3. Anticipate discharge soon if she continues to improve hematologically, monitor for now

## 2023-05-28 NOTE — PROGRESS NOTES
[facility]     Progress Note    Patient Name: Nieves Mc  : 1975  MRN: [id number]  Primary Care Physician:  Alida Latham APRN  Date of admission: 2023  Service date and time: 23 13:49 EDT  Subjective   Subjective     Chief Complaint: low platelets, platelet transfusion reaction    HPI:  Patient feels better today  Denies any epistaxis  Objective   Objective     Vitals:   Temp:  [97.6 °F (36.4 °C)-98.9 °F (37.2 °C)] 97.6 °F (36.4 °C)  Heart Rate:  [] 68  Resp:  [12-22] 12  BP: ()/(40-73) 100/60  Physical Exam    Constitutional: Awake, alert   Eyes: PERRLA, sclerae anicteric, no conjunctival injection   HENT: NCAT, mucous membranes moist   Neck: Supple, no thyromegaly, no lymphadenopathy, trachea midline   Respiratory: Clear to auscultation bilaterally, nonlabored respirations    Cardiovascular: RRR, no murmurs, rubs, or gallops, palpable pedal pulses bilaterally   Gastrointestinal: Positive bowel sounds, soft, nontender, nondistended   Musculoskeletal: No bilateral ankle edema, no clubbing or cyanosis to extremities   Psychiatric: Appropriate affect, cooperative   Neurologic: Oriented x 3, strength symmetric in all extremities, Cranial Nerves grossly intact to confrontation, speech clear   Skin: No rashes     Result Review    Result Review:  I have personally reviewed the results from the time of this admission to 2023 13:49 EDT and agree with these findings:  []  Laboratory list / accordion  []  Microbiology  []  Radiology  []  EKG/Telemetry   []  Cardiology/Vascular   []  Pathology  []  Old records  []  Other:  Most notable findings include: platelet count at 27 and hemoglobin of 7.1      Assessment & Plan   Assessment / Plan       Active Hospital Problems:  Active Hospital Problems    Diagnosis    • **Pancytopenia    • Bilateral subdural hematomas    • Lumbar radiculopathy    • Traumatic subdural hemorrhage without loss of consciousness    • Chronic pain    •  NICM (nonischemic cardiomyopathy)    • Type 2 diabetes mellitus with diabetic polyneuropathy, with long-term current use of insulin    • Depression with anxiety    • History of pulmonary embolism    • Medically noncompliant    • CML (chronic myelocytic leukemia)          **Pancytopenia     • Bilateral subdural hematomas     • Lumbar radiculopathy     • Traumatic subdural hemorrhage without loss of consciousness     • Chronic pain     • NICM (nonischemic cardiomyopathy)     • Type 2 diabetes mellitus with diabetic polyneuropathy, with long-term current use of insulin     • Depression with anxiety     • History of pulmonary embolism     • Medically noncompliant     • CML (chronic myelocytic leukemia)      Patient to be transfused with PRBC today       DVT prophylaxis:  Mechanical DVT prophylaxis orders are present.    CODE STATUS:   Medical Intervention Limits: NO intubation (DNI); NO artificial nutrition  Level Of Support Discussed With: Patient; Next of Kin (If No Surrogate)  Code Status (Patient has no pulse and is not breathing): CPR (Attempt to Resuscitate)  Medical Interventions (Patient has pulse or is breathing): Limited Support  Release to patient: Routine Release    Disposition:  I expect patient to be discharged in 3 days.    Waqas Oneal MD

## 2023-05-29 LAB
ANION GAP SERPL CALCULATED.3IONS-SCNC: 15 MMOL/L (ref 5–15)
ANION GAP SERPL CALCULATED.3IONS-SCNC: 16 MMOL/L (ref 5–15)
BH BB BLOOD EXPIRATION DATE: NORMAL
BH BB BLOOD TYPE BARCODE: 6200
BH BB DISPENSE STATUS: NORMAL
BH BB PRODUCT CODE: NORMAL
BH BB UNIT NUMBER: NORMAL
BUN SERPL-MCNC: 44 MG/DL (ref 6–20)
BUN SERPL-MCNC: 44 MG/DL (ref 6–20)
BUN/CREAT SERPL: 38.6 (ref 7–25)
BUN/CREAT SERPL: 53 (ref 7–25)
CALCIUM SPEC-SCNC: 10 MG/DL (ref 8.6–10.5)
CALCIUM SPEC-SCNC: 9.4 MG/DL (ref 8.6–10.5)
CHLORIDE SERPL-SCNC: 101 MMOL/L (ref 98–107)
CHLORIDE SERPL-SCNC: 96 MMOL/L (ref 98–107)
CO2 SERPL-SCNC: 24 MMOL/L (ref 22–29)
CO2 SERPL-SCNC: 28 MMOL/L (ref 22–29)
CREAT SERPL-MCNC: 0.83 MG/DL (ref 0.57–1)
CREAT SERPL-MCNC: 1.14 MG/DL (ref 0.57–1)
CROSSMATCH INTERPRETATION: NORMAL
DEPRECATED RDW RBC AUTO: 50.3 FL (ref 37–54)
EGFRCR SERPLBLD CKD-EPI 2021: 59.9 ML/MIN/1.73
EGFRCR SERPLBLD CKD-EPI 2021: 87.6 ML/MIN/1.73
ERYTHROCYTE [DISTWIDTH] IN BLOOD BY AUTOMATED COUNT: 16.8 % (ref 12.3–15.4)
GLUCOSE BLDC GLUCOMTR-MCNC: 115 MG/DL (ref 70–105)
GLUCOSE BLDC GLUCOMTR-MCNC: 156 MG/DL (ref 70–105)
GLUCOSE BLDC GLUCOMTR-MCNC: 201 MG/DL (ref 70–105)
GLUCOSE BLDC GLUCOMTR-MCNC: 225 MG/DL (ref 70–105)
GLUCOSE BLDC GLUCOMTR-MCNC: 408 MG/DL (ref 70–105)
GLUCOSE SERPL-MCNC: 124 MG/DL (ref 65–99)
GLUCOSE SERPL-MCNC: 542 MG/DL (ref 65–99)
HCT VFR BLD AUTO: 26.1 % (ref 34–46.6)
HGB BLD-MCNC: 8.5 G/DL (ref 12–15.9)
MCH RBC QN AUTO: 27.7 PG (ref 26.6–33)
MCHC RBC AUTO-ENTMCNC: 32.6 G/DL (ref 31.5–35.7)
MCV RBC AUTO: 84.8 FL (ref 79–97)
NRBC BLD AUTO-RTO: 0.3 /100 WBC (ref 0–0.2)
PLATELET # BLD AUTO: 20 10*3/MM3 (ref 140–450)
PMV BLD AUTO: 6.9 FL (ref 6–12)
POTASSIUM SERPL-SCNC: 3.8 MMOL/L (ref 3.5–5.2)
POTASSIUM SERPL-SCNC: 4.9 MMOL/L (ref 3.5–5.2)
RBC # BLD AUTO: 3.08 10*6/MM3 (ref 3.77–5.28)
SODIUM SERPL-SCNC: 136 MMOL/L (ref 136–145)
SODIUM SERPL-SCNC: 144 MMOL/L (ref 136–145)
UNIT  ABO: NORMAL
UNIT  RH: NORMAL
WBC NRBC COR # BLD: 0.8 10*3/MM3 (ref 3.4–10.8)

## 2023-05-29 PROCEDURE — 63710000001 INSULIN GLARGINE PER 5 UNITS

## 2023-05-29 PROCEDURE — 85025 COMPLETE CBC W/AUTO DIFF WBC: CPT | Performed by: INTERNAL MEDICINE

## 2023-05-29 PROCEDURE — 82948 REAGENT STRIP/BLOOD GLUCOSE: CPT

## 2023-05-29 PROCEDURE — 99232 SBSQ HOSP IP/OBS MODERATE 35: CPT | Performed by: INTERNAL MEDICINE

## 2023-05-29 PROCEDURE — 80048 BASIC METABOLIC PNL TOTAL CA: CPT | Performed by: INTERNAL MEDICINE

## 2023-05-29 PROCEDURE — 85007 BL SMEAR W/DIFF WBC COUNT: CPT | Performed by: INTERNAL MEDICINE

## 2023-05-29 PROCEDURE — 63710000001 INSULIN LISPRO (HUMAN) PER 5 UNITS

## 2023-05-29 RX ORDER — OXYCODONE HYDROCHLORIDE 5 MG/1
5 TABLET ORAL EVERY 4 HOURS PRN
Status: DISCONTINUED | OUTPATIENT
Start: 2023-05-29 | End: 2023-05-30

## 2023-05-29 RX ADMIN — ALLOPURINOL 500 MG: 300 TABLET ORAL at 08:31

## 2023-05-29 RX ADMIN — CITALOPRAM HYDROBROMIDE 10 MG: 20 TABLET ORAL at 08:32

## 2023-05-29 RX ADMIN — MIDODRINE HYDROCHLORIDE 10 MG: 5 TABLET ORAL at 08:32

## 2023-05-29 RX ADMIN — GABAPENTIN 300 MG: 300 CAPSULE ORAL at 16:53

## 2023-05-29 RX ADMIN — INSULIN LISPRO 12 UNITS: 100 INJECTION, SOLUTION INTRAVENOUS; SUBCUTANEOUS at 02:55

## 2023-05-29 RX ADMIN — OXYCODONE 5 MG: 5 TABLET ORAL at 18:54

## 2023-05-29 RX ADMIN — SENNOSIDES AND DOCUSATE SODIUM 2 TABLET: 50; 8.6 TABLET ORAL at 08:32

## 2023-05-29 RX ADMIN — INSULIN LISPRO 6 UNITS: 100 INJECTION, SOLUTION INTRAVENOUS; SUBCUTANEOUS at 08:32

## 2023-05-29 RX ADMIN — LOSARTAN POTASSIUM 25 MG: 25 TABLET, FILM COATED ORAL at 08:32

## 2023-05-29 RX ADMIN — INSULIN LISPRO 7 UNITS: 100 INJECTION, SOLUTION INTRAVENOUS; SUBCUTANEOUS at 21:36

## 2023-05-29 RX ADMIN — Medication 10 ML: at 08:49

## 2023-05-29 RX ADMIN — MIRTAZAPINE 15 MG: 15 TABLET, FILM COATED ORAL at 21:36

## 2023-05-29 RX ADMIN — OXYCODONE 5 MG: 5 TABLET ORAL at 11:26

## 2023-05-29 RX ADMIN — METOPROLOL SUCCINATE 25 MG: 25 TABLET, EXTENDED RELEASE ORAL at 08:32

## 2023-05-29 RX ADMIN — EMPAGLIFLOZIN 25 MG: 25 TABLET, FILM COATED ORAL at 08:32

## 2023-05-29 RX ADMIN — OXYCODONE 5 MG: 5 TABLET ORAL at 23:14

## 2023-05-29 RX ADMIN — GABAPENTIN 300 MG: 300 CAPSULE ORAL at 21:36

## 2023-05-29 RX ADMIN — MIDODRINE HYDROCHLORIDE 10 MG: 5 TABLET ORAL at 16:53

## 2023-05-29 RX ADMIN — ALPRAZOLAM 0.5 MG: 0.5 TABLET ORAL at 21:36

## 2023-05-29 RX ADMIN — INSULIN LISPRO 8 UNITS: 100 INJECTION, SOLUTION INTRAVENOUS; SUBCUTANEOUS at 18:54

## 2023-05-29 RX ADMIN — SENNOSIDES AND DOCUSATE SODIUM 2 TABLET: 50; 8.6 TABLET ORAL at 21:36

## 2023-05-29 RX ADMIN — FUROSEMIDE 40 MG: 40 TABLET ORAL at 08:31

## 2023-05-29 RX ADMIN — GABAPENTIN 300 MG: 300 CAPSULE ORAL at 08:32

## 2023-05-29 RX ADMIN — Medication 10 ML: at 21:36

## 2023-05-29 RX ADMIN — INSULIN GLARGINE 35 UNITS: 100 INJECTION, SOLUTION SUBCUTANEOUS at 21:36

## 2023-05-29 RX ADMIN — MIDODRINE HYDROCHLORIDE 10 MG: 5 TABLET ORAL at 00:39

## 2023-05-29 RX ADMIN — MIDODRINE HYDROCHLORIDE 10 MG: 5 TABLET ORAL at 23:14

## 2023-05-29 NOTE — NURSING NOTE
Pt currently refusing timed lab draw. Pt's minor child is lying abed with her. Pt states labs can be drawn when her minor child is ready to move from the bed. Advised lab to attempt lab draw at a later time.

## 2023-05-29 NOTE — NURSING NOTE
Pt currently refusing to wear O2 probe. Pt reports that she has not being wearing probe during most of her stay so far.

## 2023-05-29 NOTE — PROGRESS NOTES
[facility]     Progress Note    Patient Name: Nieves Mc  : 1975  MRN: [id number]  Primary Care Physician:  Alida Latham APRN  Date of admission: 2023  Service date and time: 23 15:46 EDT  Subjective   Subjective     Chief Complaint: low platelets, platelet transfusion reaction  HPI:  Patient Reports pain is better controlled today       Objective   Objective     Vitals:   Temp:  [97.5 °F (36.4 °C)-98.5 °F (36.9 °C)] 98.4 °F (36.9 °C)  Heart Rate:  [] 102  Resp:  [13-18] 13  BP: ()/(39-77) 95/63  Physical Exam    Constitutional: Awake, alert   Eyes: PERRLA, sclerae anicteric, no conjunctival injection   HENT: NCAT, mucous membranes moist   Neck: Supple, no thyromegaly, no lymphadenopathy, trachea midline   Respiratory: Clear to auscultation bilaterally, nonlabored respirations    Cardiovascular: RRR, no murmurs, rubs, or gallops, palpable pedal pulses bilaterally   Gastrointestinal: Positive bowel sounds, soft, nontender, nondistended   Musculoskeletal: No bilateral ankle edema, no clubbing or cyanosis to extremities   Psychiatric: Appropriate affect, cooperative   Neurologic: Oriented x 3, strength symmetric in all extremities, Cranial Nerves grossly intact to confrontation, speech clear   Skin: No rashes     Result Review    Result Review:  I have personally reviewed the results from the time of this admission to 2023 15:46 EDT and agree with these findings:  [x]  Laboratory list / accordion  []  Microbiology  []  Radiology  []  EKG/Telemetry   []  Cardiology/Vascular   []  Pathology  []  Old records  []  Other:  Most notable findings include: platelet count of 20, WBC of 0.8    Assessment & Plan   Assessment / Plan       Active Hospital Problems:  Active Hospital Problems    Diagnosis    • **Pancytopenia    • Bilateral subdural hematomas    • Lumbar radiculopathy    • Traumatic subdural hemorrhage without loss of consciousness    • Chronic pain    • NICM  (nonischemic cardiomyopathy)    • Type 2 diabetes mellitus with diabetic polyneuropathy, with long-term current use of insulin    • Depression with anxiety    • History of pulmonary embolism    • Medically noncompliant    • CML (chronic myelocytic leukemia)      Plan:   S/p prbc transfusion  Follow up CBC in AM      DVT prophylaxis:  Mechanical DVT prophylaxis orders are present.    CODE STATUS:   Medical Intervention Limits: NO intubation (DNI); NO artificial nutrition  Level Of Support Discussed With: Patient; Next of Kin (If No Surrogate)  Code Status (Patient has no pulse and is not breathing): CPR (Attempt to Resuscitate)  Medical Interventions (Patient has pulse or is breathing): Limited Support  Release to patient: Routine Release    Disposition:  I expect patient to be discharged in 2 to 3 days .    Waqas Oneal MD

## 2023-05-29 NOTE — PROGRESS NOTES
Hematology/Oncology Inpatient Progress Note    PATIENT NAME: Nieves Mc  : 1975  MRN: [id number]    CHIEF COMPLAINT:     HISTORY OF PRESENT ILLNESS:      Nieves Mc is a 47 y.o. female who presented to [facility] on 2023 with complaints of nausea, vomiting, coughing and chills during a platelet transfusion.  Past medical history of CML on ponatinib, cardiomyopathy, chronic pain, uncontrolled type 1 diabetes, chronic anemia, insomnia, anxiety and depression, pulmonary embolism on chronic anticoagulation with Xarelto, medical noncompliance.       Evaluation in the ED: Patient reported a fall 2 to 3 weeks ago during which she hit the right side of her head a CT of the head was done showing bilateral subdural hygromas suggesting remote subdural hemorrhage; local mass effect with sulcal and effacement and decreased of the size of lateral ventricles but no evidence of herniation; no acute intracranial hemorrhage.  The patient was admitted for further treatment of her pancytopenia and remote subdural hemorrhage with plans for pretreatment with IV Solu-Medrol, Tylenol and Benadryl prior to transfusing another unit of platelets.        23  Hematology/Oncology was consulted on a patient known to us and followed in the office by Dr. Lerma for her diagnosis of CML.  Patient initially presented in  when she was seen in consultation while hospitalized.  At that time the patient was on imatinib.  In  she had progressive thrombocytosis and was transition to nilotinib.  After that she was lost to follow-up until she was once again seen during a hospitalization in .  She was continued on nilotinib and hydroxyurea.  In 2022 she had once again been lost to follow-up for 3 months when she presented in the office after not taking nilotinib during that time.  She had shortness of breath and cough for which a 2D echocardiogram was done and showed a reduced EF of 41 to 45%.  Due to  cardiomyopathy she was transitioned to imatinib and hydroxyurea.  In November 2022 bone marrow biopsy showed her to be 18 accelerated phase CML and that the imatinib was not controlling her malignancy well and she was initiated on ponatinib 45 mg p.o. daily.  She had significant body pain with this dose and for that reason was reduced to 30 mg p.o. daily.  She was once again lost to follow-up for approximately 6 weeks during that time she self reduced to ponatinib 15 mg daily.  She was seen in the office on 5/17/2023 at which time her platelet count was noted to be 8000 and she was severely neutropenic.  Her ponatinib was held and she was sent to the ambulatory care unit for 2 units of platelets to be transfused and a platelet level to be checked posttransfusion for further planning.     Patient has a history of CML on ponatinib.  Patient has been noncompliant with lab evaluations as recommended.  She has been taking 50 mg of ponatinib.  She comes to the office with significant pancytopenia, severe neutropenia, platelets of 7000 and anemia.  Patient was sent to the ambulatory care for platelet transfusions, she developed chills nausea but did receive platelets.  Repeat platelet check was no significantly changed at 8000 for that reason patient was sent to the ER to be admitted to the hospital.  She has a history of falls approximately 2 weeks earlier.  While in the ER she had CT of the head which showed old subdural hematoma.  Patient denies nausea vomiting fevers or chills.  She has not been able to come to the office due to social issues, transportation issues     5/26/2023 - patient was given 2 units of HLA matched platelets.   5/27/2023 - plt count improved to 33  5/29/2023 - plt count 20  He/She  has a past medical history of Bone pain, COVID-19 virus infection (01/12/2022), Diabetes mellitus, Extremity pain, Leg pain, Migraine, Pulmonary embolism, and Vision loss.     PCP: Alida Latham,  "APRN    Subjective    Patient again has had epistaxis last night, improved      MEDICATIONS:    Scheduled Meds:  allopurinol, 500 mg, Oral, Daily  citalopram, 10 mg, Oral, Daily  empagliflozin, 25 mg, Oral, Daily  furosemide, 40 mg, Oral, Daily  gabapentin, 300 mg, Oral, TID  insulin glargine, 1-200 Units, Subcutaneous, Nightly - Glucommander  insulin lispro, 1-200 Units, Subcutaneous, 4x Daily With Meals & Nightly  losartan, 25 mg, Oral, Daily  metoprolol succinate XL, 25 mg, Oral, Daily  midodrine, 10 mg, Oral, Q8H  mirtazapine, 15 mg, Oral, Nightly  senna-docusate sodium, 2 tablet, Oral, BID  sodium chloride, 10 mL, Intravenous, Q12H       Continuous Infusions:  pain,        PRN Meds:  •  acetaminophen **OR** acetaminophen **OR** acetaminophen  •  ALPRAZolam  •  senna-docusate sodium **AND** polyethylene glycol **AND** bisacodyl **AND** bisacodyl  •  Calcium Replacement - Follow Nurse / BPA Driven Protocol  •  dextrose  •  dextrose  •  glucagon (human recombinant)  •  hydrocortisone  •  hydrOXYzine  •  insulin lispro  •  Magnesium Standard Dose Replacement - Follow Nurse / BPA Driven Protocol  •  melatonin  •  ondansetron **OR** ondansetron  •  oxyCODONE  •  Phosphorus Replacement - Follow Nurse / BPA Driven Protocol  •  Potassium Replacement - Follow Nurse / BPA Driven Protocol  •  prochlorperazine  •  sodium chloride  •  sodium chloride     ALLERGIES:  No Known Allergies    Objective    VITALS:   /75 (BP Location: Right arm, Patient Position: Lying)   Pulse 91   Temp 98.5 °F (36.9 °C) (Oral)   Resp 13   Ht 162.6 cm (64\")   Wt 57.8 kg (127 lb 6.8 oz)   LMP 05/25/2022 (Approximate)   SpO2 97%   BMI 21.87 kg/m²     PHYSICAL EXAM: (performed by MD)  Physical Exam  Constitutional:       Appearance: Normal appearance. She is ill-appearing.   HENT:      Head: Normocephalic and atraumatic.      Mouth/Throat:      Mouth: Mucous membranes are moist.   Eyes:      Pupils: Pupils are equal, round, and reactive " to light.   Cardiovascular:      Rate and Rhythm: Normal rate and regular rhythm.      Pulses: Normal pulses.      Heart sounds: No murmur heard.  Pulmonary:      Effort: Pulmonary effort is normal.      Breath sounds: Normal breath sounds.   Abdominal:      General: There is no distension.      Palpations: Abdomen is soft. There is no mass.      Tenderness: There is no abdominal tenderness.   Musculoskeletal:         General: Normal range of motion.      Cervical back: Normal range of motion and neck supple.   Skin:     General: Skin is warm.      Coloration: Skin is pale.   Neurological:      General: No focal deficit present.      Mental Status: She is alert.   Psychiatric:         Mood and Affect: Mood normal.       I have reexamined the patient and the results are consistent with the previously documented exam. Dolores Mukherjee MD     RECENT LABS:  Lab Results (last 24 hours)     Procedure Component Value Units Date/Time    POC Glucose Once [[id number]]  (Abnormal) Collected: 05/29/23 1116    Specimen: Blood Updated: 05/29/23 1117     Glucose 115 mg/dL      Comment: Serial Number: [id number]:  [id number]       POC Glucose Once [[id number]]  (Abnormal) Collected: 05/29/23 0707    Specimen: Blood Updated: 05/29/23 0708     Glucose 156 mg/dL      Comment: Serial Number: [id number]:  [id number]       POC Glucose Once [[id number]]  (Abnormal) Collected: 05/29/23 0240    Specimen: Blood Updated: 05/29/23 0242     Glucose 408 mg/dL      Comment: Serial Number: [id number]:  [id number]       Basic Metabolic Panel [[id number]]  (Abnormal) Collected: 05/29/23 0102    Specimen: Blood Updated: 05/29/23 0209     Glucose 542 mg/dL      BUN 44 mg/dL      Creatinine 1.14 mg/dL      Sodium 136 mmol/L      Potassium 4.9 mmol/L      Chloride 96 mmol/L      CO2 24.0 mmol/L      Calcium 9.4 mg/dL      BUN/Creatinine Ratio 38.6     Anion Gap 16.0 mmol/L      eGFR 59.9 mL/min/1.73     Narrative:      GFR Normal >60  Chronic  Kidney Disease <60  Kidney Failure <15      CBC & Differential [[id number]]  (Abnormal) Collected: 05/29/23 0102    Specimen: Blood Updated: 05/29/23 0154    Narrative:      The following orders were created for panel order CBC & Differential.  Procedure                               Abnormality         Status                     ---------                               -----------         ------                     CBC Auto Differential[[id number]]        Abnormal            Final result               Scan Slide[[id number]]                                                                    Please view results for these tests on the individual orders.    CBC Auto Differential [[id number]]  (Abnormal) Collected: 05/29/23 0102    Specimen: Blood Updated: 05/29/23 0154     WBC 0.80 10*3/mm3      Comment: Per SOP, 1 manual differential per week on WBC of 0.6 to 1.0 done on 052823.        RBC 3.08 10*6/mm3      Hemoglobin 8.5 g/dL      Hematocrit 26.1 %      MCV 84.8 fL      MCH 27.7 pg      MCHC 32.6 g/dL      RDW 16.8 %      RDW-SD 50.3 fl      MPV 6.9 fL      Platelets 20 10*3/mm3      Comment: Reviewed by Pathologist within the past 30 days on 051823.          nRBC 0.3 /100 WBC     POC Glucose Once [[id number]]  (Abnormal) Collected: 05/28/23 2100    Specimen: Blood Updated: 05/28/23 2105     Glucose 530 mg/dL      Comment: Serial Number: [id number]:  [id number]       POC Glucose Once [[id number]]  (Abnormal) Collected: 05/28/23 1628    Specimen: Blood Updated: 05/28/23 1630     Glucose 133 mg/dL      Comment: Serial Number: [id number]:  [id number]           IMAGING REVIEWED:  No radiology results for the last day    Assessment & Plan      ASSESSMENT:    1. Pancytopenia: There is progressive improvement in the blood counts though very slowly.  s/p Transfusion HLA matched platelets improvement in plt count to 33, now at 20, hb down to 7.1 yesterday, s/p transfusion of 1 unit now at 8.5.  2. Accelerated phase CML:  She will remain off of the ponatininb for now.   3. Alloimmunization: No need for transfusion currently.  She does not respond to platelet transfusions but has responded to HLA typed platelets. No transfusion for now monitor for bleeding.  4. Bilateral subdural hematomas: No suggestion of further bleeding, no increasing headaches.  5. Back pain. Increased the dose of the oxycodone. Pain is now better controlled.  6. Palliative care consult was called, patient will discuss with Dr. Lerma tomorrow.     PLANS    1. Continue to monitor daily CBCs  2. Transfuse HLA matched platelets as needed.  3. Anticipate discharge soon if she continues to improve hematologically, monitor for now

## 2023-05-29 NOTE — PLAN OF CARE
Goal Outcome Evaluation:              Outcome Evaluation: Pt received 1U PRBCs during day. Reflux HGB 8.5, WBC down to 0.8. Pt continues in neutropenic precautions. Contact precautions continue r/t H/O ESBL. Soft BPs noted. Midodrine administered. Pt C/O pain with PRN pain medication administered with + effect noted. Blood glucose remains elevated during this shift. JP Rosejena Terrazas-Ronald notified. PRN insulin administered per glucommander. Family remains at bedside. No additional C/O voiced per pt at this time. Pt currently resting abed. Will continue to monitor.         Problem: Fall Injury Risk  Goal: Absence of Fall and Fall-Related Injury  Outcome: Ongoing, Progressing  Intervention: Identify and Manage Contributors  Recent Flowsheet Documentation  Taken 5/29/2023 0224 by Angel Lombardo RN  Medication Review/Management: medications reviewed  Taken 5/29/2023 0030 by Angel Lombardo RN  Medication Review/Management: medications reviewed  Taken 5/28/2023 2220 by Angel Lombardo RN  Medication Review/Management: medications reviewed  Taken 5/28/2023 2040 by Angel Lombardo RN  Medication Review/Management: medications reviewed  Intervention: Promote Injury-Free Environment  Recent Flowsheet Documentation  Taken 5/29/2023 0224 by Angel Lombardo RN  Safety Promotion/Fall Prevention: safety round/check completed  Taken 5/29/2023 0030 by Angel Lombardo RN  Safety Promotion/Fall Prevention: safety round/check completed  Taken 5/28/2023 2220 by Angel Lombardo, CAMILA  Safety Promotion/Fall Prevention: safety round/check completed  Taken 5/28/2023 2040 by Angel Lombardo, CAMILA  Safety Promotion/Fall Prevention:   safety round/check completed   nonskid shoes/slippers when out of bed   fall prevention program maintained   clutter free environment maintained   assistive device/personal items within reach     Problem: Skin Injury Risk Increased  Goal: Skin Health and Integrity  Outcome: Ongoing,  Progressing  Intervention: Optimize Skin Protection  Recent Flowsheet Documentation  Taken 5/29/2023 0030 by Angel Lombardo RN  Pressure Reduction Techniques: frequent weight shift encouraged  Pressure Reduction Devices: pressure-redistributing mattress utilized  Skin Protection:   adhesive use limited   tubing/devices free from skin contact  Taken 5/28/2023 2040 by Angel Lombardo RN  Pressure Reduction Techniques: frequent weight shift encouraged  Head of Bed (HOB) Positioning: HOB elevated  Pressure Reduction Devices: pressure-redistributing mattress utilized  Skin Protection:   adhesive use limited   tubing/devices free from skin contact     Problem: Adult Inpatient Plan of Care  Goal: Plan of Care Review  Outcome: Ongoing, Progressing  Flowsheets (Taken 5/29/2023 0344)  Outcome Evaluation: Pt received 1U PRBCs during day. Reflux HGB 8.5, WBC down to 0.8. Pt continues in neutropenic precautions. Contact precautions continue r/t H/O ESBL. Soft BPs noted. Midodrine administered. Pt C/O pain with PRN pain medication administered with + effect noted. Blood glucose remains elevated during this shift. JP Hurst notified. PRN insulin administered per glucommander. Family remains at bedside. No additional C/O voiced per pt at this time. Pt currently resting abed. Will continue to monitor.  Goal: Patient-Specific Goal (Individualized)  Outcome: Ongoing, Progressing  Goal: Absence of Hospital-Acquired Illness or Injury  Outcome: Ongoing, Progressing  Intervention: Identify and Manage Fall Risk  Recent Flowsheet Documentation  Taken 5/29/2023 0224 by Angel Lombardo RN  Safety Promotion/Fall Prevention: safety round/check completed  Taken 5/29/2023 0030 by Angel Lombardo RN  Safety Promotion/Fall Prevention: safety round/check completed  Taken 5/28/2023 2220 by Angel Lombardo RN  Safety Promotion/Fall Prevention: safety round/check completed  Taken 5/28/2023 2040 by Angel Lombardo, CAMILA  Safety  Promotion/Fall Prevention:   safety round/check completed   nonskid shoes/slippers when out of bed   fall prevention program maintained   clutter free environment maintained   assistive device/personal items within reach  Intervention: Prevent Skin Injury  Recent Flowsheet Documentation  Taken 5/29/2023 0030 by Angel Lombardo RN  Skin Protection:   adhesive use limited   tubing/devices free from skin contact  Taken 5/28/2023 2040 by Angel Lombardo RN  Body Position: position changed independently  Skin Protection:   adhesive use limited   tubing/devices free from skin contact  Intervention: Prevent and Manage VTE (Venous Thromboembolism) Risk  Recent Flowsheet Documentation  Taken 5/28/2023 2040 by Angel Lombardo RN  Activity Management: activity encouraged  Intervention: Prevent Infection  Recent Flowsheet Documentation  Taken 5/29/2023 0224 by Angel Lombardo RN  Infection Prevention:   hand hygiene promoted   personal protective equipment utilized   rest/sleep promoted   single patient room provided  Taken 5/29/2023 0030 by Angel Lombardo RN  Infection Prevention:   hand hygiene promoted   personal protective equipment utilized   rest/sleep promoted   single patient room provided  Taken 5/28/2023 2220 by Angel Lombardo RN  Infection Prevention:   hand hygiene promoted   personal protective equipment utilized   rest/sleep promoted   single patient room provided  Taken 5/28/2023 2040 by Angel Lombardo RN  Infection Prevention:   hand hygiene promoted   personal protective equipment utilized   rest/sleep promoted   single patient room provided  Goal: Optimal Comfort and Wellbeing  Outcome: Ongoing, Progressing  Intervention: Monitor Pain and Promote Comfort  Recent Flowsheet Documentation  Taken 5/28/2023 2040 by Angel Lombardo RN  Pain Management Interventions:   care clustered   diversional activity provided   pillow support provided   position adjusted   see MAR   relaxation techniques promoted    unnecessary movement minimized  Intervention: Provide Person-Centered Care  Recent Flowsheet Documentation  Taken 5/28/2023 2040 by Angel Lombardo RN  Trust Relationship/Rapport:   care explained   choices provided   questions answered   questions encouraged   reassurance provided   thoughts/feelings acknowledged  Goal: Readiness for Transition of Care  Outcome: Ongoing, Progressing     Problem: Adjustment to Illness (Sepsis/Septic Shock)  Goal: Optimal Coping  Outcome: Ongoing, Progressing  Intervention: Optimize Psychosocial Adjustment to Illness  Recent Flowsheet Documentation  Taken 5/28/2023 2040 by Angel Lombardo RN  Supportive Measures:   active listening utilized   verbalization of feelings encouraged  Family/Support System Care: support provided     Problem: Bleeding (Sepsis/Septic Shock)  Goal: Absence of Bleeding  Outcome: Ongoing, Progressing     Problem: Glycemic Control Impaired (Sepsis/Septic Shock)  Goal: Blood Glucose Level Within Desired Range  Outcome: Ongoing, Progressing  Intervention: Optimize Glycemic Control  Recent Flowsheet Documentation  Taken 5/28/2023 2040 by Angel Lombardo RN  Glycemic Management:   blood glucose monitored   oral hydration promoted     Problem: Infection Progression (Sepsis/Septic Shock)  Goal: Absence of Infection Signs and Symptoms  Outcome: Ongoing, Progressing  Intervention: Initiate Sepsis Management  Recent Flowsheet Documentation  Taken 5/29/2023 0224 by Angel Lombardo RN  Infection Prevention:   hand hygiene promoted   personal protective equipment utilized   rest/sleep promoted   single patient room provided  Isolation Precautions:   precautions maintained   contact   protective  Taken 5/29/2023 0030 by Angel Lombardo RN  Infection Prevention:   hand hygiene promoted   personal protective equipment utilized   rest/sleep promoted   single patient room provided  Isolation Precautions:   precautions maintained   contact   protective  Taken 5/28/2023 2220  by Angel Lombardo, RN  Infection Prevention:   hand hygiene promoted   personal protective equipment utilized   rest/sleep promoted   single patient room provided  Isolation Precautions:   precautions maintained   protective   contact  Taken 5/28/2023 2040 by Angel Lombardo, RN  Infection Prevention:   hand hygiene promoted   personal protective equipment utilized   rest/sleep promoted   single patient room provided  Isolation Precautions:   precautions maintained   protective   contact  Intervention: Promote Recovery  Recent Flowsheet Documentation  Taken 5/28/2023 2040 by Angel Lombardo, RN  Activity Management: activity encouraged  Sleep/Rest Enhancement:   awakenings minimized   consistent schedule promoted   regular sleep/rest pattern promoted   relaxation techniques promoted     Problem: Nutrition Impaired (Sepsis/Septic Shock)  Goal: Optimal Nutrition Intake  Outcome: Ongoing, Progressing

## 2023-05-29 NOTE — NURSING NOTE
Pt reports that she told the  to leave her room. Pt reports that daughter was in bed with her, tearful, & the pt was consoling her daughter when the  wanted to draw her blood.

## 2023-05-30 LAB
ANISOCYTOSIS BLD QL: ABNORMAL
DEPRECATED RDW RBC AUTO: 49 FL (ref 37–54)
ERYTHROCYTE [DISTWIDTH] IN BLOOD BY AUTOMATED COUNT: 16.8 % (ref 12.3–15.4)
GLUCOSE BLDC GLUCOMTR-MCNC: 113 MG/DL (ref 70–105)
GLUCOSE BLDC GLUCOMTR-MCNC: 127 MG/DL (ref 70–105)
GLUCOSE BLDC GLUCOMTR-MCNC: 165 MG/DL (ref 70–105)
GLUCOSE BLDC GLUCOMTR-MCNC: 178 MG/DL (ref 70–105)
HCT VFR BLD AUTO: 26.6 % (ref 34–46.6)
HGB BLD-MCNC: 8.8 G/DL (ref 12–15.9)
LYMPHOCYTES # BLD MANUAL: 2.57 10*3/MM3 (ref 0.7–3.1)
MCH RBC QN AUTO: 27.7 PG (ref 26.6–33)
MCHC RBC AUTO-ENTMCNC: 33.1 G/DL (ref 31.5–35.7)
MCV RBC AUTO: 83.6 FL (ref 79–97)
NEUTROPHILS # BLD AUTO: 0.14 10*3/MM3 (ref 1.7–7)
NEUTROPHILS NFR BLD MANUAL: 5 % (ref 42.7–76)
PLATELET # BLD AUTO: 22 10*3/MM3 (ref 140–450)
PMV BLD AUTO: 7.2 FL (ref 6–12)
RBC # BLD AUTO: 3.18 10*6/MM3 (ref 3.77–5.28)
SCAN SLIDE: NORMAL
SMALL PLATELETS BLD QL SMEAR: ABNORMAL
VARIANT LYMPHS NFR BLD MANUAL: 95 % (ref 19.6–45.3)
WBC MORPH BLD: NORMAL
WBC NRBC COR # BLD: 2.7 10*3/MM3 (ref 3.4–10.8)

## 2023-05-30 PROCEDURE — 99232 SBSQ HOSP IP/OBS MODERATE 35: CPT | Performed by: INTERNAL MEDICINE

## 2023-05-30 PROCEDURE — 63710000001 INSULIN GLARGINE PER 5 UNITS

## 2023-05-30 PROCEDURE — 25010000002 ONDANSETRON PER 1 MG: Performed by: NURSE PRACTITIONER

## 2023-05-30 PROCEDURE — 63710000001 INSULIN LISPRO (HUMAN) PER 5 UNITS

## 2023-05-30 PROCEDURE — 82948 REAGENT STRIP/BLOOD GLUCOSE: CPT

## 2023-05-30 RX ORDER — OXYCODONE HYDROCHLORIDE 5 MG/1
10 TABLET ORAL EVERY 4 HOURS PRN
Status: DISCONTINUED | OUTPATIENT
Start: 2023-05-30 | End: 2023-05-31 | Stop reason: HOSPADM

## 2023-05-30 RX ORDER — OXYCODONE HYDROCHLORIDE 5 MG/1
5 TABLET ORAL ONCE
Status: COMPLETED | OUTPATIENT
Start: 2023-05-30 | End: 2023-05-30

## 2023-05-30 RX ADMIN — ALLOPURINOL 500 MG: 300 TABLET ORAL at 08:56

## 2023-05-30 RX ADMIN — MIDODRINE HYDROCHLORIDE 10 MG: 5 TABLET ORAL at 08:57

## 2023-05-30 RX ADMIN — INSULIN GLARGINE 32 UNITS: 100 INJECTION, SOLUTION SUBCUTANEOUS at 21:07

## 2023-05-30 RX ADMIN — OXYCODONE 5 MG: 5 TABLET ORAL at 08:56

## 2023-05-30 RX ADMIN — GABAPENTIN 300 MG: 300 CAPSULE ORAL at 08:56

## 2023-05-30 RX ADMIN — ALPRAZOLAM 0.5 MG: 0.5 TABLET ORAL at 21:19

## 2023-05-30 RX ADMIN — METOPROLOL SUCCINATE 25 MG: 25 TABLET, EXTENDED RELEASE ORAL at 08:57

## 2023-05-30 RX ADMIN — OXYCODONE 10 MG: 5 TABLET ORAL at 21:19

## 2023-05-30 RX ADMIN — CITALOPRAM HYDROBROMIDE 10 MG: 20 TABLET ORAL at 08:56

## 2023-05-30 RX ADMIN — MIDODRINE HYDROCHLORIDE 10 MG: 5 TABLET ORAL at 17:37

## 2023-05-30 RX ADMIN — SENNOSIDES AND DOCUSATE SODIUM 2 TABLET: 50; 8.6 TABLET ORAL at 08:57

## 2023-05-30 RX ADMIN — GABAPENTIN 300 MG: 300 CAPSULE ORAL at 15:47

## 2023-05-30 RX ADMIN — FUROSEMIDE 40 MG: 40 TABLET ORAL at 08:57

## 2023-05-30 RX ADMIN — Medication 10 ML: at 21:10

## 2023-05-30 RX ADMIN — OXYCODONE 5 MG: 5 TABLET ORAL at 10:48

## 2023-05-30 RX ADMIN — INSULIN LISPRO 6 UNITS: 100 INJECTION, SOLUTION INTRAVENOUS; SUBCUTANEOUS at 12:48

## 2023-05-30 RX ADMIN — Medication 10 ML: at 08:58

## 2023-05-30 RX ADMIN — OXYCODONE 10 MG: 5 TABLET ORAL at 15:47

## 2023-05-30 RX ADMIN — OXYCODONE 5 MG: 5 TABLET ORAL at 04:36

## 2023-05-30 RX ADMIN — SENNOSIDES AND DOCUSATE SODIUM 2 TABLET: 50; 8.6 TABLET ORAL at 21:08

## 2023-05-30 RX ADMIN — MIRTAZAPINE 15 MG: 15 TABLET, FILM COATED ORAL at 21:08

## 2023-05-30 RX ADMIN — INSULIN LISPRO 3 UNITS: 100 INJECTION, SOLUTION INTRAVENOUS; SUBCUTANEOUS at 09:12

## 2023-05-30 RX ADMIN — EMPAGLIFLOZIN 25 MG: 25 TABLET, FILM COATED ORAL at 08:57

## 2023-05-30 RX ADMIN — INSULIN LISPRO 10 UNITS: 100 INJECTION, SOLUTION INTRAVENOUS; SUBCUTANEOUS at 17:37

## 2023-05-30 RX ADMIN — GABAPENTIN 300 MG: 300 CAPSULE ORAL at 21:08

## 2023-05-30 RX ADMIN — ONDANSETRON 4 MG: 2 INJECTION INTRAMUSCULAR; INTRAVENOUS at 12:48

## 2023-05-30 NOTE — PROGRESS NOTES
"Chp welcomed. Pt and daughter struggling to understand d/c plan for today (5/30/2023). Pt expressed frustration about 'feeling safe to go home in her condition.' Chp facilitated exploration. Pt did not engage in questions/exploration with oncologist, nor did she engage in questioning her nurse. Pt stated, \"I'm afraid I'll come back at them with an attitude and it will all get messy.\" Pt stated that she asked her mother to intervene w her care team to find more understanding. Pt daughter repeatedly became tearful through visit, but remained focused on her coloring w/o engaging in much conversation. Daughter is living w pt's mother, speaking w a counselor through her school system. Daughter stated that she is \"sad because her mom's dying and there isn't anything that will stop it.\" Chp provided active listening, emotional exploration, reflection, and invitation to engage care team in conversation for more understanding. Pt did ask her RN about resources to support her if/ when she returns home. Pt/ daughter requested return visit from chp. Chp to continue to provide routine visits at least twice weekly throughout pt's admission, additional support available as requested.   "

## 2023-05-30 NOTE — PLAN OF CARE
Goal Outcome Evaluation:           Progress: improving  Outcome Evaluation: Neutopenic precautions continue r/t decrease WBC count. Count has improved from the previous day. Contact precautions continue r/t H/O ESBL. Pt C/O pain with PRN pain medication administered with + effect noted. Family remains at bedside at this time. No additional C/O voiced per pt at this time. Pt currently resting abed. Will continue to monitor.         Problem: Fall Injury Risk  Goal: Absence of Fall and Fall-Related Injury  Outcome: Ongoing, Progressing  Intervention: Identify and Manage Contributors  Recent Flowsheet Documentation  Taken 5/30/2023 0204 by Angel Lombardo, RN  Medication Review/Management: medications reviewed  Taken 5/30/2023 0047 by Angel Lombardo RN  Medication Review/Management: medications reviewed  Taken 5/29/2023 2212 by Angel Lombardo RN  Medication Review/Management: medications reviewed  Taken 5/29/2023 2055 by Angel Lombardo RN  Medication Review/Management: medications reviewed  Self-Care Promotion:   independence encouraged   BADL personal objects within reach  Intervention: Promote Injury-Free Environment  Recent Flowsheet Documentation  Taken 5/30/2023 0204 by Angel Lombardo RN  Safety Promotion/Fall Prevention: safety round/check completed  Taken 5/30/2023 0047 by Angel Lombardo RN  Safety Promotion/Fall Prevention: safety round/check completed  Taken 5/29/2023 2212 by Angel Lombardo RN  Safety Promotion/Fall Prevention: safety round/check completed  Taken 5/29/2023 2055 by Angel Lombardo RN  Safety Promotion/Fall Prevention:   safety round/check completed   nonskid shoes/slippers when out of bed   fall prevention program maintained   clutter free environment maintained   assistive device/personal items within reach     Problem: Skin Injury Risk Increased  Goal: Skin Health and Integrity  Outcome: Ongoing, Progressing  Intervention: Optimize Skin Protection  Recent Flowsheet  Documentation  Taken 5/30/2023 0047 by Angel Lombardo RN  Pressure Reduction Techniques: frequent weight shift encouraged  Pressure Reduction Devices: pressure-redistributing mattress utilized  Skin Protection:   adhesive use limited   tubing/devices free from skin contact  Taken 5/29/2023 2055 by Angel Lombardo RN  Pressure Reduction Techniques: frequent weight shift encouraged  Pressure Reduction Devices: pressure-redistributing mattress utilized  Skin Protection:   adhesive use limited   tubing/devices free from skin contact     Problem: Adult Inpatient Plan of Care  Goal: Plan of Care Review  Outcome: Ongoing, Progressing  Flowsheets (Taken 5/30/2023 0335)  Progress: improving  Outcome Evaluation: Neutopenic precautions continue r/t decrease WBC count. Count has improved from the previous day. Contact precautions continue r/t H/O ESBL. Pt C/O pain with PRN pain medication administered with + effect noted. Family remains at bedside at this time. No additional C/O voiced per pt at this time. Pt currently resting abed. Will continue to monitor.  Goal: Patient-Specific Goal (Individualized)  Outcome: Ongoing, Progressing  Goal: Absence of Hospital-Acquired Illness or Injury  Outcome: Ongoing, Progressing  Intervention: Identify and Manage Fall Risk  Recent Flowsheet Documentation  Taken 5/30/2023 0204 by Angel Lombardo RN  Safety Promotion/Fall Prevention: safety round/check completed  Taken 5/30/2023 0047 by Angel Lombardo, CAMILA  Safety Promotion/Fall Prevention: safety round/check completed  Taken 5/29/2023 2212 by Angel Lombardo RN  Safety Promotion/Fall Prevention: safety round/check completed  Taken 5/29/2023 2055 by Angel Lombardo RN  Safety Promotion/Fall Prevention:   safety round/check completed   nonskid shoes/slippers when out of bed   fall prevention program maintained   clutter free environment maintained   assistive device/personal items within reach  Intervention: Prevent Skin Injury  Recent  Flowsheet Documentation  Taken 5/30/2023 0047 by Angel Lombardo RN  Skin Protection:   adhesive use limited   tubing/devices free from skin contact  Taken 5/29/2023 2055 by Angel Lombardo RN  Body Position:   position changed independently   dangle, side of bed  Skin Protection:   adhesive use limited   tubing/devices free from skin contact  Intervention: Prevent and Manage VTE (Venous Thromboembolism) Risk  Recent Flowsheet Documentation  Taken 5/29/2023 2055 by Angel Lombardo RN  Activity Management:   up ad alessandro   activity encouraged  VTE Prevention/Management: patient refused intervention  Intervention: Prevent Infection  Recent Flowsheet Documentation  Taken 5/30/2023 0204 by Angel Lombardo RN  Infection Prevention:   hand hygiene promoted   personal protective equipment utilized   rest/sleep promoted   single patient room provided  Taken 5/30/2023 0047 by Angel Lombardo RN  Infection Prevention:   hand hygiene promoted   personal protective equipment utilized   rest/sleep promoted   single patient room provided  Taken 5/29/2023 2212 by Angel Lombardo RN  Infection Prevention:   hand hygiene promoted   personal protective equipment utilized   rest/sleep promoted   single patient room provided  Taken 5/29/2023 2055 by Angel Lombardo RN  Infection Prevention:   hand hygiene promoted   personal protective equipment utilized   rest/sleep promoted   single patient room provided  Goal: Optimal Comfort and Wellbeing  Outcome: Ongoing, Progressing  Intervention: Monitor Pain and Promote Comfort  Recent Flowsheet Documentation  Taken 5/29/2023 2055 by Angel Lombardo RN  Pain Management Interventions:   care clustered   diversional activity provided   position adjusted   see MAR   relaxation techniques promoted   unnecessary movement minimized  Intervention: Provide Person-Centered Care  Recent Flowsheet Documentation  Taken 5/29/2023 2055 by Angel Lombardo RN  Trust Relationship/Rapport:   care  explained   choices provided   questions answered   questions encouraged   reassurance provided   thoughts/feelings acknowledged  Goal: Readiness for Transition of Care  Outcome: Ongoing, Progressing     Problem: Adjustment to Illness (Sepsis/Septic Shock)  Goal: Optimal Coping  Outcome: Ongoing, Progressing  Intervention: Optimize Psychosocial Adjustment to Illness  Recent Flowsheet Documentation  Taken 5/29/2023 2055 by Angel Lombardo RN  Supportive Measures:   active listening utilized   verbalization of feelings encouraged   self-care encouraged  Family/Support System Care:   self-care encouraged   support provided     Problem: Bleeding (Sepsis/Septic Shock)  Goal: Absence of Bleeding  Outcome: Ongoing, Progressing     Problem: Glycemic Control Impaired (Sepsis/Septic Shock)  Goal: Blood Glucose Level Within Desired Range  Outcome: Ongoing, Progressing  Intervention: Optimize Glycemic Control  Recent Flowsheet Documentation  Taken 5/30/2023 0047 by Angel Lombardo RN  Glycemic Management:   blood glucose monitored   oral hydration promoted  Taken 5/29/2023 2055 by Angel Lombardo RN  Glycemic Management:   blood glucose monitored   oral hydration promoted     Problem: Infection Progression (Sepsis/Septic Shock)  Goal: Absence of Infection Signs and Symptoms  Outcome: Ongoing, Progressing  Intervention: Initiate Sepsis Management  Recent Flowsheet Documentation  Taken 5/30/2023 0204 by Angel Lombardo RN  Infection Prevention:   hand hygiene promoted   personal protective equipment utilized   rest/sleep promoted   single patient room provided  Isolation Precautions:   precautions maintained   contact   protective  Taken 5/30/2023 0047 by Angel Lombardo RN  Infection Prevention:   hand hygiene promoted   personal protective equipment utilized   rest/sleep promoted   single patient room provided  Isolation Precautions:   precautions maintained   contact   protective  Taken 5/29/2023 2212 by nAgel Lombardo  RN  Infection Prevention:   hand hygiene promoted   personal protective equipment utilized   rest/sleep promoted   single patient room provided  Isolation Precautions:   precautions maintained   contact   protective  Taken 5/29/2023 2055 by Angel Lombardo RN  Infection Prevention:   hand hygiene promoted   personal protective equipment utilized   rest/sleep promoted   single patient room provided  Isolation Precautions:   precautions maintained   contact   protective  Intervention: Promote Recovery  Recent Flowsheet Documentation  Taken 5/30/2023 0047 by Angel Lombardo, RN  Sleep/Rest Enhancement:   awakenings minimized   consistent schedule promoted   family presence promoted   regular sleep/rest pattern promoted   relaxation techniques promoted   room darkened  Taken 5/29/2023 2055 by Angel Lombardo, RN  Activity Management:   up ad alessandro   activity encouraged  Sleep/Rest Enhancement:   awakenings minimized   consistent schedule promoted   family presence promoted   regular sleep/rest pattern promoted   relaxation techniques promoted  Intervention: Promote Stabilization  Recent Flowsheet Documentation  Taken 5/29/2023 2055 by Angel Lombardo, RN  Fluid/Electrolyte Management: fluids provided     Problem: Nutrition Impaired (Sepsis/Septic Shock)  Goal: Optimal Nutrition Intake  Outcome: Ongoing, Progressing

## 2023-05-30 NOTE — PROGRESS NOTES
Hematology/Oncology Inpatient Progress Note    PATIENT NAME: Nieves Mc  : 1975  MRN: [id number]    CHIEF COMPLAINT:     HISTORY OF PRESENT ILLNESS:      Nieves Mc is a 47 y.o. female who presented to [facility] on 2023 with complaints of nausea, vomiting, coughing and chills during a platelet transfusion.  Past medical history of CML on ponatinib, cardiomyopathy, chronic pain, uncontrolled type 1 diabetes, chronic anemia, insomnia, anxiety and depression, pulmonary embolism on chronic anticoagulation with Xarelto, medical noncompliance.       Evaluation in the ED: Patient reported a fall 2 to 3 weeks ago during which she hit the right side of her head a CT of the head was done showing bilateral subdural hygromas suggesting remote subdural hemorrhage; local mass effect with sulcal and effacement and decreased of the size of lateral ventricles but no evidence of herniation; no acute intracranial hemorrhage.  The patient was admitted for further treatment of her pancytopenia and remote subdural hemorrhage with plans for pretreatment with IV Solu-Medrol, Tylenol and Benadryl prior to transfusing another unit of platelets.        23  Hematology/Oncology was consulted on a patient known to us and followed in the office by Dr. Lerma for her diagnosis of CML.  Patient initially presented in  when she was seen in consultation while hospitalized.  At that time the patient was on imatinib.  In  she had progressive thrombocytosis and was transition to nilotinib.  After that she was lost to follow-up until she was once again seen during a hospitalization in .  She was continued on nilotinib and hydroxyurea.  In 2022 she had once again been lost to follow-up for 3 months when she presented in the office after not taking nilotinib during that time.  She had shortness of breath and cough for which a 2D echocardiogram was done and showed a reduced EF of 41 to 45%.  Due to  cardiomyopathy she was transitioned to imatinib and hydroxyurea.  In November 2022 bone marrow biopsy showed her to be 18 accelerated phase CML and that the imatinib was not controlling her malignancy well and she was initiated on ponatinib 45 mg p.o. daily.  She had significant body pain with this dose and for that reason was reduced to 30 mg p.o. daily.  She was once again lost to follow-up for approximately 6 weeks during that time she self reduced to ponatinib 15 mg daily.  She was seen in the office on 5/17/2023 at which time her platelet count was noted to be 8000 and she was severely neutropenic.  Her ponatinib was held and she was sent to the ambulatory care unit for 2 units of platelets to be transfused and a platelet level to be checked posttransfusion for further planning.     Patient has a history of CML on ponatinib.  Patient has been noncompliant with lab evaluations as recommended.  She has been taking 50 mg of ponatinib.  She comes to the office with significant pancytopenia, severe neutropenia, platelets of 7000 and anemia.  Patient was sent to the ambulatory care for platelet transfusions, she developed chills nausea but did receive platelets.  Repeat platelet check was no significantly changed at 8000 for that reason patient was sent to the ER to be admitted to the hospital.  She has a history of falls approximately 2 weeks earlier.  While in the ER she had CT of the head which showed old subdural hematoma.  Patient denies nausea vomiting fevers or chills.  She has not been able to come to the office due to social issues, transportation issues     5/26/2023 - patient was given 2 units of HLA matched platelets.   5/27/2023 - plt count improved to 33  5/29/2023 - plt count 20  He/She  has a past medical history of Bone pain, COVID-19 virus infection (01/12/2022), Diabetes mellitus, Extremity pain, Leg pain, Migraine, Pulmonary embolism, and Vision loss.     PCP: Alida Latham,  "APRN    Subjective      Patient complaining of significant left lower extremity discomfort.  Pain medicines were reduced due to low blood pressure EARLIER    MEDICATIONS:    Scheduled Meds:  allopurinol, 500 mg, Oral, Daily  citalopram, 10 mg, Oral, Daily  empagliflozin, 25 mg, Oral, Daily  furosemide, 40 mg, Oral, Daily  gabapentin, 300 mg, Oral, TID  insulin glargine, 1-200 Units, Subcutaneous, Nightly - Glucommander  insulin lispro, 1-200 Units, Subcutaneous, 4x Daily With Meals & Nightly  metoprolol succinate XL, 25 mg, Oral, Daily  midodrine, 10 mg, Oral, Q8H  mirtazapine, 15 mg, Oral, Nightly  senna-docusate sodium, 2 tablet, Oral, BID  sodium chloride, 10 mL, Intravenous, Q12H       Continuous Infusions:  pain,        PRN Meds:  •  acetaminophen **OR** acetaminophen **OR** acetaminophen  •  ALPRAZolam  •  senna-docusate sodium **AND** polyethylene glycol **AND** bisacodyl **AND** bisacodyl  •  Calcium Replacement - Follow Nurse / BPA Driven Protocol  •  dextrose  •  dextrose  •  glucagon (human recombinant)  •  hydrocortisone  •  hydrOXYzine  •  insulin lispro  •  Magnesium Standard Dose Replacement - Follow Nurse / BPA Driven Protocol  •  melatonin  •  ondansetron **OR** ondansetron  •  oxyCODONE  •  Phosphorus Replacement - Follow Nurse / BPA Driven Protocol  •  Potassium Replacement - Follow Nurse / BPA Driven Protocol  •  prochlorperazine  •  sodium chloride  •  sodium chloride     ALLERGIES:  No Known Allergies    Objective    VITALS:   /72 (BP Location: Right arm, Patient Position: Lying)   Pulse 118   Temp 97.7 °F (36.5 °C) (Oral)   Resp 16   Ht 162.6 cm (64\")   Wt 56.7 kg (125 lb)   LMP 05/25/2022 (Approximate)   SpO2 94%   BMI 21.46 kg/m²     PHYSICAL EXAM: (performed by MD)  Physical Exam  Constitutional:       Appearance: Normal appearance. She is ill-appearing.   HENT:      Head: Normocephalic and atraumatic.      Mouth/Throat:      Mouth: Mucous membranes are moist.   Eyes:      " Pupils: Pupils are equal, round, and reactive to light.   Cardiovascular:      Rate and Rhythm: Normal rate and regular rhythm.      Pulses: Normal pulses.      Heart sounds: No murmur heard.  Pulmonary:      Effort: Pulmonary effort is normal.      Breath sounds: Normal breath sounds.   Abdominal:      General: There is no distension.      Palpations: Abdomen is soft. There is no mass.      Tenderness: There is no abdominal tenderness.   Musculoskeletal:         General: Normal range of motion.      Cervical back: Normal range of motion and neck supple.   Skin:     General: Skin is warm.      Coloration: Skin is pale.   Neurological:      General: No focal deficit present.      Mental Status: She is alert.   Psychiatric:         Mood and Affect: Mood normal.       I have reexamined the patient and the results are consistent with the previously documented exam. Meghann Lerma MD     RECENT LABS:  Lab Results (last 24 hours)     Procedure Component Value Units Date/Time    POC Glucose Once [[id number]]  (Abnormal) Collected: 05/31/23 0712    Specimen: Blood Updated: 05/31/23 0714     Glucose 236 mg/dL      Comment: Serial Number: [id number]:  [id number]       Manual Differential [[id number]]  (Abnormal) Collected: 05/31/23 0023    Specimen: Blood Updated: 05/31/23 0258     Neutrophil % 3.0 %      Lymphocyte % 96.0 %      Monocyte % 1.0 %      Neutrophils Absolute 0.08 10*3/mm3      Lymphocytes Absolute 2.59 10*3/mm3      Monocytes Absolute 0.03 10*3/mm3      Anisocytosis Slight/1+     WBC Morphology Normal     Platelet Estimate Decreased    CBC & Differential [[id number]]  (Abnormal) Collected: 05/31/23 0023    Specimen: Blood Updated: 05/31/23 0258    Narrative:      The following orders were created for panel order CBC & Differential.  Procedure                               Abnormality         Status                     ---------                               -----------         ------                      CBC Auto Differential[[id number]]        Abnormal            Final result               Scan Slide[[id number]]                                       Final result                 Please view results for these tests on the individual orders.    Scan Slide [[id number]] Collected: 05/31/23 0023    Specimen: Blood Updated: 05/31/23 0258     Scan Slide --     Comment: See Manual Differential Results       CBC Auto Differential [[id number]]  (Abnormal) Collected: 05/31/23 0023    Specimen: Blood Updated: 05/31/23 0258     WBC 2.70 10*3/mm3      RBC 3.18 10*6/mm3      Hemoglobin 8.9 g/dL      Hematocrit 26.3 %      MCV 82.7 fL      MCH 27.8 pg      MCHC 33.6 g/dL      RDW 16.8 %      RDW-SD 51.6 fl      MPV 7.1 fL      Platelets 25 10*3/mm3     Narrative:      The previously reported component NRBC is no longer being reported. Previous result was 0.2 /100 WBC (Reference Range: 0.0-0.2 /100 WBC) on 5/31/2023 at 0116 EDT.    POC Glucose Once [[id number]]  (Abnormal) Collected: 05/30/23 1957    Specimen: Blood Updated: 05/30/23 1959     Glucose 127 mg/dL      Comment: Serial Number: [id number]:  [id number]       POC Glucose Once [[id number]]  (Abnormal) Collected: 05/30/23 1657    Specimen: Blood Updated: 05/30/23 1658     Glucose 165 mg/dL      Comment: Serial Number: [id number]:  [id number]       POC Glucose Once [[id number]]  (Abnormal) Collected: 05/30/23 1140    Specimen: Blood Updated: 05/30/23 1141     Glucose 178 mg/dL      Comment: Serial Number: [id number]:  [id number]           IMAGING REVIEWED:  No radiology results for the last day    Assessment & Plan      ASSESSMENT:    1. Pancytopenia: There is progressive improvement in the blood counts though very slowly.  s/p Transfusion HLA matched platelets improvement in plt count to 33, now at 20, hb down to 7.1 yesterday, s/p transfusion of 1 unit now at 8.5.  Platelets are stable above 20,000  2. Chronic pain: Would increase oxycodone to 10 mg since her  blood pressures have improved  3. Episodes of hypotension: Primary has discontinued losartan  4. Accelerated phase CML: She will remain off of the ponatininb for now.   5. Alloimmunization: No need for transfusion currently.  She does not respond to platelet transfusions but has responded to HLA typed platelets. No transfusion for now monitor for bleeding.  6. Bilateral subdural hematomas: No suggestion of further bleeding, no increasing headaches.  7. Back pain. Increased the dose of the oxycodone. Pain is now better controlled.  8. Palliative care consult was called, patient will discuss with Dr. Lerma tomorrow.     PLANS    1. Continue to monitor daily CBCs  2. Platelets have improved with transfusion of HLA matched platelets  3. Increase pain medicines oxycodone 10 mg every 4 hours as needed  4. Discontinue losartan by primary  5. Anticipate discharge soon  6. Transfuse HLA matched platelets as needed.  7. Anticipate discharge soon if she continues to improve hematologically, monitor for now    I spent 25 total minutes, face-to-face, caring for Hope today. 90% of this time involved counseling and/or coordination of care as documented within this note.    Electronically signed by Meghann Lerma MD, 05/31/23, 8:36 AM EDT.

## 2023-05-30 NOTE — PROGRESS NOTES
[facility]     Progress Note    Patient Name: Nieves Mc  : 1975  MRN: [id number]  Primary Care Physician:  Alida Latham APRN  Date of admission: 2023  Service date and time: 23 10:38 EDT  Subjective   Subjective     Chief Complaint: low platelets, platelet transfusion reaction    Patient seen and examined this morning.  Taking over care today.  Complaining of worsening bilateral extremity pain which is chronic but worsened at this time.  Oncology managing pain control and oxycodone increased.    Pertinent positives as noted in HPI/subjective.  All other systems were reviewed and are negative.        Objective   Objective     Vitals:   Temp:  [97.9 °F (36.6 °C)-98.5 °F (36.9 °C)] 98.3 °F (36.8 °C)  Heart Rate:  [] 115  Resp:  [13-19] 17  BP: ()/(56-75) 126/74  Physical Exam     General: Awake, alert, NAD  Eyes: PERRL, EOMI, conjunctivae are clear  Cardiovascular: Regular rate and rhythm, no murmurs  Respiratory: Clear to auscultation bilaterally, no wheezing or rales, unlabored breathing  Abdomen: Soft, nontender, positive bowel sounds, no guarding  Neurologic: A&O, CN grossly intact, moves all extremities spontaneously  Musculoskeletal: Normal range of motion, no other gross deformities  Skin: Warm, dry, intact      Result Review    Result Review:  I have personally reviewed the results from the time of this admission to 2023 10:38 EDT and agree with these findings:  [x]  Laboratory list / accordion  [x]  Microbiology  [x]  Radiology  [x]  EKG/Telemetry   [x]  Cardiology/Vascular   []  Pathology  [x]  Old records  []  Other:      Assessment & Plan   Assessment / Plan       Active Hospital Problems:  Active Hospital Problems    Diagnosis    • **Pancytopenia    • Bilateral subdural hematomas    • Lumbar radiculopathy    • Traumatic subdural hemorrhage without loss of consciousness    • Chronic pain    • NICM (nonischemic cardiomyopathy)    • Type 2 diabetes  mellitus with diabetic polyneuropathy, with long-term current use of insulin    • Depression with anxiety    • History of pulmonary embolism    • Medically noncompliant    • CML (chronic myelocytic leukemia)      Assessment:  Pancytopenia  Accelerated phase of CML  Bilateral SDH/hygromas   Hypertension  Chronic pain    Plan:  -Heme-onc following, platelets slowly improving  -Imaging findings noted  -Neurosurgery signed off, no surgical intervention needed at this time  -Patient off any anticoagulation  -Blood pressure on lower side, losartan discontinued, continue other home meds, monitor and adjust as needed  -Pain being managed by oncology  -Plan to discharge home once cleared by heme-onc    DVT prophylaxis  -SCDs for now      CODE STATUS:   Medical Intervention Limits: NO intubation (DNI); NO artificial nutrition  Level Of Support Discussed With: Patient; Next of Kin (If No Surrogate)  Code Status (Patient has no pulse and is not breathing): CPR (Attempt to Resuscitate)  Medical Interventions (Patient has pulse or is breathing): Limited Support  Release to patient: Routine Release    Disposition: Plan to discharge home once cleared by heme-onc.      Electronically signed by Martha Shields DO

## 2023-05-30 NOTE — PLAN OF CARE
Goal Outcome Evaluation:           Progress: no change  Outcome Evaluation: Pt c/o severe pain today. Losartan dc'd and oxycodone increased to home dose. BP maintained wdp w/ changes. Daughter at bedside most of the day and mother visited in the afternoon. Pt to dc home once cleared by hem/onc, monitoring cbc.

## 2023-05-30 NOTE — CASE MANAGEMENT/SOCIAL WORK
Continued Stay Note  ZOHAIB Amor     Patient Name: Nieves Mc  MRN: [id number]  Today's Date: 5/30/2023    Admit Date: 5/17/2023    Plan: Anticipate routine home. Snow referral for 3in1 BSC and rollator (orders pending from MD).   Discharge Plan     Row Name 05/30/23 1514       Plan    Plan Comments DC Barriers: Pain medication adjustments per oncology. Platelet monitoring continues.              Jaida Charles RN     Office Phone: [phone]  Office Cell: [phone]

## 2023-05-31 ENCOUNTER — READMISSION MANAGEMENT (OUTPATIENT)
Dept: CALL CENTER | Facility: HOSPITAL | Age: 48
End: 2023-05-31

## 2023-05-31 ENCOUNTER — HOME HEALTH ADMISSION (OUTPATIENT)
Dept: HOME HEALTH SERVICES | Facility: HOME HEALTHCARE | Age: 48
End: 2023-05-31
Payer: COMMERCIAL

## 2023-05-31 VITALS
TEMPERATURE: 98.5 F | DIASTOLIC BLOOD PRESSURE: 76 MMHG | HEIGHT: 64 IN | RESPIRATION RATE: 13 BRPM | HEART RATE: 111 BPM | SYSTOLIC BLOOD PRESSURE: 116 MMHG | WEIGHT: 125 LBS | BODY MASS INDEX: 21.34 KG/M2 | OXYGEN SATURATION: 94 %

## 2023-05-31 LAB
ANISOCYTOSIS BLD QL: ABNORMAL
DEPRECATED RDW RBC AUTO: 51.6 FL (ref 37–54)
ERYTHROCYTE [DISTWIDTH] IN BLOOD BY AUTOMATED COUNT: 16.8 % (ref 12.3–15.4)
GLUCOSE BLDC GLUCOMTR-MCNC: 141 MG/DL (ref 70–105)
GLUCOSE BLDC GLUCOMTR-MCNC: 236 MG/DL (ref 70–105)
GLUCOSE BLDC GLUCOMTR-MCNC: 81 MG/DL (ref 70–105)
HCT VFR BLD AUTO: 26.3 % (ref 34–46.6)
HGB BLD-MCNC: 8.9 G/DL (ref 12–15.9)
LYMPHOCYTES # BLD MANUAL: 2.59 10*3/MM3 (ref 0.7–3.1)
LYMPHOCYTES NFR BLD MANUAL: 1 % (ref 5–12)
MCH RBC QN AUTO: 27.8 PG (ref 26.6–33)
MCHC RBC AUTO-ENTMCNC: 33.6 G/DL (ref 31.5–35.7)
MCV RBC AUTO: 82.7 FL (ref 79–97)
MONOCYTES # BLD: 0.03 10*3/MM3 (ref 0.1–0.9)
NEUTROPHILS # BLD AUTO: 0.08 10*3/MM3 (ref 1.7–7)
NEUTROPHILS NFR BLD MANUAL: 3 % (ref 42.7–76)
PLATELET # BLD AUTO: 25 10*3/MM3 (ref 140–450)
PMV BLD AUTO: 7.1 FL (ref 6–12)
RBC # BLD AUTO: 3.18 10*6/MM3 (ref 3.77–5.28)
SCAN SLIDE: NORMAL
SMALL PLATELETS BLD QL SMEAR: ABNORMAL
VARIANT LYMPHS NFR BLD MANUAL: 96 % (ref 19.6–45.3)
WBC MORPH BLD: NORMAL
WBC NRBC COR # BLD: 2.7 10*3/MM3 (ref 3.4–10.8)

## 2023-05-31 PROCEDURE — 97110 THERAPEUTIC EXERCISES: CPT

## 2023-05-31 PROCEDURE — 99232 SBSQ HOSP IP/OBS MODERATE 35: CPT | Performed by: INTERNAL MEDICINE

## 2023-05-31 PROCEDURE — 85007 BL SMEAR W/DIFF WBC COUNT: CPT | Performed by: INTERNAL MEDICINE

## 2023-05-31 PROCEDURE — 63710000001 INSULIN LISPRO (HUMAN) PER 5 UNITS

## 2023-05-31 PROCEDURE — 85025 COMPLETE CBC W/AUTO DIFF WBC: CPT | Performed by: INTERNAL MEDICINE

## 2023-05-31 PROCEDURE — 86832 HLA CLASS I HIGH DEFIN QUAL: CPT

## 2023-05-31 PROCEDURE — 82948 REAGENT STRIP/BLOOD GLUCOSE: CPT

## 2023-05-31 RX ORDER — MIDODRINE HYDROCHLORIDE 10 MG/1
10 TABLET ORAL EVERY 8 HOURS
Qty: 90 TABLET | Refills: 0 | Status: SHIPPED | OUTPATIENT
Start: 2023-05-31

## 2023-05-31 RX ADMIN — OXYCODONE 10 MG: 5 TABLET ORAL at 07:10

## 2023-05-31 RX ADMIN — EMPAGLIFLOZIN 25 MG: 25 TABLET, FILM COATED ORAL at 09:28

## 2023-05-31 RX ADMIN — CITALOPRAM HYDROBROMIDE 10 MG: 20 TABLET ORAL at 09:29

## 2023-05-31 RX ADMIN — INSULIN LISPRO 13 UNITS: 100 INJECTION, SOLUTION INTRAVENOUS; SUBCUTANEOUS at 13:15

## 2023-05-31 RX ADMIN — OXYCODONE 10 MG: 5 TABLET ORAL at 13:15

## 2023-05-31 RX ADMIN — METOPROLOL SUCCINATE 25 MG: 25 TABLET, EXTENDED RELEASE ORAL at 09:28

## 2023-05-31 RX ADMIN — INSULIN LISPRO 8 UNITS: 100 INJECTION, SOLUTION INTRAVENOUS; SUBCUTANEOUS at 09:27

## 2023-05-31 RX ADMIN — SENNOSIDES AND DOCUSATE SODIUM 2 TABLET: 50; 8.6 TABLET ORAL at 09:28

## 2023-05-31 RX ADMIN — Medication 10 ML: at 09:27

## 2023-05-31 RX ADMIN — FUROSEMIDE 40 MG: 40 TABLET ORAL at 09:28

## 2023-05-31 RX ADMIN — ALLOPURINOL 500 MG: 300 TABLET ORAL at 09:28

## 2023-05-31 RX ADMIN — GABAPENTIN 300 MG: 300 CAPSULE ORAL at 09:28

## 2023-05-31 RX ADMIN — MIDODRINE HYDROCHLORIDE 10 MG: 5 TABLET ORAL at 09:27

## 2023-05-31 RX ADMIN — MIDODRINE HYDROCHLORIDE 10 MG: 5 TABLET ORAL at 00:06

## 2023-05-31 NOTE — PROGRESS NOTES
Spoke with patient and her daughter to verify her demographics and explain services. Pt is agreeable and has pallitus being set up as well.  [facility] [place] [address] [address]   Please use [phone] as the primary contact for visits  Dr. Lerma with oncology is supposed to be placing an order for labwork.    Nursing, PT, OT     Spoke with holly. Alida Latham, NP is agreeable to sign the plan of care and follow for  Home health orders.    Oncology: DR. Lerma  Pharmacy: [facility]

## 2023-05-31 NOTE — PROGRESS NOTES
Nutrition Services    Patient Name: Nieves Mc  YOB: 1975  MRN: [id number]  Admission date: 5/17/2023    PPE Documentation        PPE Worn By Provider Did not enter room for this encounter   PPE Worn By Patient  N/A     PROGRESS NOTE      Encounter Information: Progress note to check on PO intake. Noted pt w/ signed discharge summary.        PO Diet: Diet: Diabetic Diets; Consistent Carbohydrate; Neutropenic/Low Microbial; Texture: Regular Texture (IDDSI 7); Fluid Consistency: Thin (IDDSI 0)   PO Supplements: Boost Plus QD   PO Intake:  25-50%       Current nutrition support: None    Nutrition support review: N/a        Labs (reviewed below): Reviewed        GI Function:  Stool Output  Stool (mL): 0 mL (05/18/23 1400)          Nutrition Intervention Updates: Continue to encourage PO intake. ONS such as Boost or Ensure (or generic equivalents) are recommended.        Results from last 7 days   Lab Units 05/29/23  2258 05/29/23  0102 05/28/23  0903   SODIUM mmol/L 144 136 139   POTASSIUM mmol/L 3.8 4.9 4.7   CHLORIDE mmol/L 101 96* 101   CO2 mmol/L 28.0 24.0 26.0   BUN mg/dL 44* 44* 32*   CREATININE mg/dL 0.83 1.14* 0.82   CALCIUM mg/dL 10.0 9.4 9.6   GLUCOSE mg/dL 124* 542* 328*     Results from last 7 days   Lab Units 05/31/23  0023   HEMOGLOBIN g/dL 8.9*   HEMATOCRIT % 26.3*     COVID19   Date Value Ref Range Status   07/27/2022 Not Detected Not Detected - Ref. Range Final     Lab Results   Component Value Date    HGBA1C 7.60 (H) 05/21/2023       RD to follow up per protocol.    Electronically signed by:  Eva Wolff RD  05/31/23 12:36 EDT

## 2023-05-31 NOTE — PLAN OF CARE
Goal Outcome Evaluation:      VS at baseline, NSR on monitor. No new complaints or issues.      Problem: Fall Injury Risk  Goal: Absence of Fall and Fall-Related Injury  Outcome: Ongoing, Progressing  Intervention: Identify and Manage Contributors  Recent Flowsheet Documentation  Taken 5/31/2023 0400 by Alma Rosa Lombardo LPN  Medication Review/Management: medications reviewed  Taken 5/31/2023 0200 by Alma Rosa Lombardo LPN  Medication Review/Management: medications reviewed  Taken 5/31/2023 0000 by Alma Rosa Lombardo LPN  Medication Review/Management: medications reviewed  Taken 5/30/2023 2200 by Alma Rosa Lombardo LPN  Medication Review/Management: medications reviewed  Taken 5/30/2023 2000 by Alma Rosa Lombardo LPN  Medication Review/Management: medications reviewed  Intervention: Promote Injury-Free Environment  Recent Flowsheet Documentation  Taken 5/31/2023 0400 by Alma Rosa Lombardo LPN  Safety Promotion/Fall Prevention:   activity supervised   assistive device/personal items within reach   clutter free environment maintained   fall prevention program maintained   nonskid shoes/slippers when out of bed   room organization consistent   safety round/check completed  Taken 5/31/2023 0200 by Alma Rosa Lombardo LPN  Safety Promotion/Fall Prevention:   activity supervised   assistive device/personal items within reach   clutter free environment maintained   fall prevention program maintained   nonskid shoes/slippers when out of bed   room organization consistent   safety round/check completed  Taken 5/31/2023 0000 by Munira, Alma Rosa, LPN  Safety Promotion/Fall Prevention:   activity supervised   assistive device/personal items within reach   clutter free environment maintained   fall prevention program maintained   nonskid shoes/slippers when out of bed   safety round/check completed  Taken 5/30/2023 2200 by Alma Rosa Lombardo LPN  Safety Promotion/Fall Prevention:   activity supervised   assistive  device/personal items within reach   clutter free environment maintained   fall prevention program maintained   nonskid shoes/slippers when out of bed   room organization consistent   safety round/check completed  Taken 5/30/2023 2000 by Munira, Alma Rosa, LPN  Safety Promotion/Fall Prevention:   activity supervised   assistive device/personal items within reach   clutter free environment maintained   fall prevention program maintained   nonskid shoes/slippers when out of bed   room organization consistent   safety round/check completed     Problem: Skin Injury Risk Increased  Goal: Skin Health and Integrity  Outcome: Ongoing, Progressing  Intervention: Optimize Skin Protection  Recent Flowsheet Documentation  Taken 5/31/2023 0400 by Alma Rosa Lombardo LPN  Head of Bed (HOB) Positioning: HOB elevated  Taken 5/31/2023 0200 by Alma Rosa Lombardo LPN  Head of Bed (HOB) Positioning: HOB elevated  Taken 5/31/2023 0000 by Alma Rosa Lombardo LPN  Pressure Reduction Techniques: frequent weight shift encouraged  Head of Bed (HOB) Positioning: HOB elevated  Pressure Reduction Devices: pressure-redistributing mattress utilized  Skin Protection:   adhesive use limited   transparent dressing maintained   tubing/devices free from skin contact  Taken 5/30/2023 2200 by Alma Rosa Lombardo LPN  Head of Bed (HOB) Positioning: HOB elevated  Taken 5/30/2023 2000 by Alma Rosa Lombardo LPN  Pressure Reduction Techniques: frequent weight shift encouraged  Head of Bed (HOB) Positioning: HOB elevated  Pressure Reduction Devices: pressure-redistributing mattress utilized  Skin Protection:   adhesive use limited   transparent dressing maintained   tubing/devices free from skin contact     Problem: Adult Inpatient Plan of Care  Goal: Plan of Care Review  Outcome: Ongoing, Progressing  Goal: Patient-Specific Goal (Individualized)  Outcome: Ongoing, Progressing  Goal: Absence of Hospital-Acquired Illness or Injury  Outcome: Ongoing,  Progressing  Intervention: Identify and Manage Fall Risk  Recent Flowsheet Documentation  Taken 5/31/2023 0400 by Alma Rosa Lombardo LPN  Safety Promotion/Fall Prevention:   activity supervised   assistive device/personal items within reach   clutter free environment maintained   fall prevention program maintained   nonskid shoes/slippers when out of bed   room organization consistent   safety round/check completed  Taken 5/31/2023 0200 by Alma Rosa Lombardo LPN  Safety Promotion/Fall Prevention:   activity supervised   assistive device/personal items within reach   clutter free environment maintained   fall prevention program maintained   nonskid shoes/slippers when out of bed   room organization consistent   safety round/check completed  Taken 5/31/2023 0000 by Munira, Alma Rosa, LPN  Safety Promotion/Fall Prevention:   activity supervised   assistive device/personal items within reach   clutter free environment maintained   fall prevention program maintained   nonskid shoes/slippers when out of bed   safety round/check completed  Taken 5/30/2023 2200 by Alma Rosa Lombardo LPN  Safety Promotion/Fall Prevention:   activity supervised   assistive device/personal items within reach   clutter free environment maintained   fall prevention program maintained   nonskid shoes/slippers when out of bed   room organization consistent   safety round/check completed  Taken 5/30/2023 2000 by Munira, Alma Rosa, LPN  Safety Promotion/Fall Prevention:   activity supervised   assistive device/personal items within reach   clutter free environment maintained   fall prevention program maintained   nonskid shoes/slippers when out of bed   room organization consistent   safety round/check completed  Intervention: Prevent Skin Injury  Recent Flowsheet Documentation  Taken 5/31/2023 0400 by Alma Rosa Lombardo LPN  Body Position: position changed independently  Taken 5/31/2023 0200 by Alma Rosa Lombardo LPN  Body Position: position  changed independently  Taken 5/31/2023 0000 by Alma Rosa Lombardo LPN  Body Position: position changed independently  Skin Protection:   adhesive use limited   transparent dressing maintained   tubing/devices free from skin contact  Taken 5/30/2023 2200 by Alma Rosa Lombardo LPN  Body Position: position changed independently  Taken 5/30/2023 2000 by Alma Rosa Lombardo LPN  Body Position: position changed independently  Skin Protection:   adhesive use limited   transparent dressing maintained   tubing/devices free from skin contact  Intervention: Prevent and Manage VTE (Venous Thromboembolism) Risk  Recent Flowsheet Documentation  Taken 5/31/2023 0400 by Alma Rosa Lombardo LPN  Activity Management: up ad alessandro  VTE Prevention/Management: patient refused intervention  Taken 5/31/2023 0200 by Alma Rosa Lombardo LPN  Activity Management: up ad alessandro  Taken 5/31/2023 0000 by Alma Rosa Lombardo LPN  Activity Management: up ad alessandro  Range of Motion: active ROM (range of motion) encouraged  Taken 5/30/2023 2200 by Alma Rosa Lombardo LPN  Activity Management: up ad alessandro  Taken 5/30/2023 2000 by Alma Rosa Lombardo LPN  Activity Management: up ad alessandro  VTE Prevention/Management: patient refused intervention  Range of Motion: active ROM (range of motion) encouraged  Intervention: Prevent Infection  Recent Flowsheet Documentation  Taken 5/31/2023 0400 by Alma Rosa Lobmardo LPN  Infection Prevention:   environmental surveillance performed   hand hygiene promoted   personal protective equipment utilized   rest/sleep promoted   single patient room provided  Taken 5/31/2023 0200 by Alma Rosa Lombardo LPN  Infection Prevention:   environmental surveillance performed   hand hygiene promoted   rest/sleep promoted   personal protective equipment utilized   single patient room provided  Taken 5/31/2023 0000 by Alma Rosa Lombardo LPN  Infection Prevention:   environmental surveillance performed   hand hygiene promoted   personal  protective equipment utilized   rest/sleep promoted   single patient room provided  Taken 5/30/2023 2200 by Alma Rosa Lombardo LPN  Infection Prevention:   environmental surveillance performed   hand hygiene promoted   personal protective equipment utilized   rest/sleep promoted   single patient room provided  Taken 5/30/2023 2000 by Alma Rosa Lombardo LPN  Infection Prevention:   environmental surveillance performed   hand hygiene promoted   personal protective equipment utilized   single patient room provided   rest/sleep promoted  Goal: Optimal Comfort and Wellbeing  Outcome: Ongoing, Progressing  Intervention: Monitor Pain and Promote Comfort  Recent Flowsheet Documentation  Taken 5/30/2023 2000 by Alma Rosa Lombardo LPN  Pain Management Interventions:   see MAR   position adjusted   pillow support provided  Intervention: Provide Person-Centered Care  Recent Flowsheet Documentation  Taken 5/31/2023 0000 by Alma Rosa Lombardo LPN  Trust Relationship/Rapport:   care explained   questions encouraged  Taken 5/30/2023 2000 by Alma Rosa Lombardo LPN  Trust Relationship/Rapport:   care explained   questions encouraged  Goal: Readiness for Transition of Care  Outcome: Ongoing, Progressing     Problem: Adjustment to Illness (Sepsis/Septic Shock)  Goal: Optimal Coping  Outcome: Ongoing, Progressing  Intervention: Optimize Psychosocial Adjustment to Illness  Recent Flowsheet Documentation  Taken 5/31/2023 0000 by Alma Rosa Lombardo LPN  Family/Support System Care: self-care encouraged  Taken 5/30/2023 2000 by Alma Rosa Lombardo LPN  Supportive Measures:   active listening utilized   self-care encouraged  Family/Support System Care: self-care encouraged     Problem: Bleeding (Sepsis/Septic Shock)  Goal: Absence of Bleeding  Outcome: Ongoing, Progressing     Problem: Glycemic Control Impaired (Sepsis/Septic Shock)  Goal: Blood Glucose Level Within Desired Range  Outcome: Ongoing, Progressing  Intervention: Optimize  Glycemic Control  Recent Flowsheet Documentation  Taken 5/31/2023 0000 by Alma Rosa Lombardo LPN  Glycemic Management: blood glucose monitored  Taken 5/30/2023 2000 by Alma Rosa Lombardo LPN  Glycemic Management: blood glucose monitored     Problem: Infection Progression (Sepsis/Septic Shock)  Goal: Absence of Infection Signs and Symptoms  Outcome: Ongoing, Progressing  Intervention: Initiate Sepsis Management  Recent Flowsheet Documentation  Taken 5/31/2023 0400 by Alma Rosa Lombardo LPN  Infection Prevention:   environmental surveillance performed   hand hygiene promoted   personal protective equipment utilized   rest/sleep promoted   single patient room provided  Isolation Precautions:   precautions maintained   contact   protective  Taken 5/31/2023 0200 by Alma Rosa Lombardo LPN  Infection Prevention:   environmental surveillance performed   hand hygiene promoted   rest/sleep promoted   personal protective equipment utilized   single patient room provided  Isolation Precautions:   contact   protective   precautions maintained  Taken 5/31/2023 0000 by Alma Rosa Lombardo LPN  Infection Prevention:   environmental surveillance performed   hand hygiene promoted   personal protective equipment utilized   rest/sleep promoted   single patient room provided  Isolation Precautions:   contact   protective   precautions maintained  Taken 5/30/2023 2200 by Alma Rosa Lombardo LPN  Infection Prevention:   environmental surveillance performed   hand hygiene promoted   personal protective equipment utilized   rest/sleep promoted   single patient room provided  Isolation Precautions:   protective   precautions maintained   contact  Taken 5/30/2023 2000 by Alma Rosa Lombardo LPN  Infection Prevention:   environmental surveillance performed   hand hygiene promoted   personal protective equipment utilized   single patient room provided   rest/sleep promoted  Isolation Precautions:   protective   precautions maintained    contact  Intervention: Promote Recovery  Recent Flowsheet Documentation  Taken 5/31/2023 0400 by Alma Rosa Lombardo LPN  Activity Management: up ad alessandro  Sleep/Rest Enhancement: awakenings minimized  Taken 5/31/2023 0200 by Alma Rosa Lombardo LPN  Activity Management: up ad alessandro  Taken 5/31/2023 0000 by Alma Rosa Lombardo LPN  Activity Management: up ad alessandro  Sleep/Rest Enhancement: awakenings minimized  Taken 5/30/2023 2200 by Alma Rosa Lombardo LPN  Activity Management: up ad alessandro  Taken 5/30/2023 2000 by Alma Rosa Lombardo LPN  Activity Management: up ad alessandro  Sleep/Rest Enhancement: awakenings minimized  Intervention: Promote Stabilization  Recent Flowsheet Documentation  Taken 5/31/2023 0000 by Alma Rosa Lombardo LPN  Fluid/Electrolyte Management: fluids provided     Problem: Nutrition Impaired (Sepsis/Septic Shock)  Goal: Optimal Nutrition Intake  Outcome: Ongoing, Progressing

## 2023-05-31 NOTE — PLAN OF CARE
Goal Outcome Evaluation:             Assessment: Hope NICHOLAS Mc presents with functional mobility impairments related to Pancytopernia which indicate the need for skilled intervention. Tolerating session today without incident but liimted by acute and chronic pain.  Will continue to follow and progress as tolerated. She would benefit from HH PT at discharge.

## 2023-05-31 NOTE — DISCHARGE PLACEMENT REQUEST
"J Luis Kowalski (47 y.o. Female)     Date of Birth   1975    Social Security Number       Address   [address] [place] [facility]    Home Phone   [phone]    MRN   [id number]       Sikhism   None    Marital Status   Legally                             Admission Date   5/17/23    Admission Type   Emergency    Admitting Provider   Mónica Tillman MD    Attending Provider   Martha Shields DO    Department, Room/Bed   [facility], 2127/1       Discharge Date       Discharge Disposition       Discharge Destination                               Attending Provider: Martha Shields DO    Allergies: No Known Allergies    Isolation: Contact   Infection: ESBL E coli (02/15/23)   Code Status: CPR    Ht: 162.6 cm (64\")   Wt: 56.7 kg (125 lb)    Admission Cmt: None   Principal Problem: Pancytopenia [D61.818]                 Active Insurance as of 5/17/2023     Primary Coverage     Payor Plan Insurance Group Employer/Plan Group    ANTHEM MEDICARE REPLACEMENT ANTHEM MEDICARE ADVANTAGE [id number]     Payor Plan Address Payor Plan Phone Number Payor Plan Fax Number Effective Dates    [facility] [id number] [phone]  1/1/2023 - None Entered    [facility] [address]       Subscriber Name Subscriber Birth Date Member ID       J LUIS KOWALSKI 1975 [id number]           Secondary Coverage     Payor Plan Insurance Group Employer/Plan Group    MEDICA[place]END[place]      Payor Plan Address Payor Plan Phone Number Payor Plan Fax Number Effective Dates       5/17/2023 - None Entered    Subscriber Name Subscriber Birth Date Member ID       J LUIS KOWALSKI 1975                  Emergency Contacts      (Rel.) Home Phone Work Phone Mobile Phone    Sayra Cabezas (Mother) [phone] -- --    Primitivo Kowalski (Son) -- -- [phone]               History & Physical      Roma Brown APRN at 05/17/23 2059     Attestation signed by " Mónica Tillman MD at 23 0155    I was available to provide additional medical advice on behalf of the APRN at the time of admission on an as needed basis. I did not participate in a beside evaluation of the patient or provide direct medical care services to the patient.                       [facility] Medicine Services  History & Physical    Patient Name: Nieves Mc  : 1975  MRN: [id number]  Primary Care Physician:  Alida Latham APRN  Date of admission: 2023  Date and Time of Service: 2023 at 2030    Subjective       Chief Complaint: low platelets, platelet transfusion reaction     History of Present Illness: Nieves Mc is a 47 y.o. female with PMH of CML followed outpatient by Dr. Lerma. She was seen at the cancer center today 2023 after recent missed visits. Labwork showed pancytopenia with WBC 2.6, Hgb 8.6, platelets 7.  Patient was sent to ambulatory care for 2 units of platelets. She apparently had a reaction where she became nauseous without vomiting and started coughing and had chills. The transfusion was stopped and she was given decadron and benadryl. Her repeat platelets were 8 so it was decided to send her to the ED for hospital admission and platelet transfusion. The patient reports her petechiae on her upper chest and lower legs and inside her lips started today. She denied any shortness of breath. She has no further cough or nausea. She has been fatigued lately. She fell 2 or 3 weeks ago and hit the right side of her head. She denied any vision changes or current headache. She has been ambulating at home.     In the ED the patient had CT head that showed bilateral subdural hygromas suggesting remote subdural hemorrhage.  There is local mass effect with sulcal and effacement and decreased of the size of lateral ventricles but no evidence of herniation.  No acute intracranial hemorrhage.  Recommend follow-up, further evaluation as clinically  indicated.  Patient with no neurological deficits on exam.  Vital signs stable.  This provider was under the impression that neurosurgery was notified but no consult seen in the computer. Documentation shows APRN discussed case with ER physician and they agreed to contact the hospitalist for admission for further treatment of pancytopenia and remote subdural hemorrhage.     This provider spoke with oncology Dr. Lerma who recommended pre treatment with IV solumedrol, tylenol, and benadryl prior to transfusing another unit of platelets.     Review of Systems   HENT: Negative.    Eyes: Negative.    Cardiovascular: Negative.    Respiratory: Negative.    Endocrine: Negative.    Hematologic/Lymphatic: Negative.    Skin: Negative.         bruising   Musculoskeletal: Negative.    Gastrointestinal: Negative.    Genitourinary: Negative.    Neurological: Positive for weakness.   Psychiatric/Behavioral: Negative.    Allergic/Immunologic: Negative.    All other systems reviewed and are negative.       Personal History     Past Medical History:   Diagnosis Date   • Bone pain    • COVID-19 virus infection 2022   • Diabetes mellitus    • Extremity pain     Carlin. legs pain   • Leg pain     left leg greater   • Migraine    • Pulmonary embolism    • Vision loss     doing surgery       Past Surgical History:   Procedure Laterality Date   • BONE MARROW BIOPSY     • BREAST SURGERY     • BRONCHOSCOPY N/A 2022    Procedure: BRONCHOSCOPY bilateral lung washing;  Surgeon: Charlene Camara MD;  Location: [facility] ENDOSCOPY;  Service: Pulmonary;  Laterality: N/A;  post: rule out infection vs transfusion lung injury   •  SECTION     • CHOLECYSTECTOMY     • EYE SURGERY      laser surgery due  to hemmorage--- 2021-- another surgery  lt eye11/15/21   • RETINAL DETACHMENT SURGERY     • SPINE SURGERY      Lombardi spinal block   • TUBAL ABDOMINAL LIGATION         Family History: family history includes Diabetes in her maternal  grandmother and mother; Heart attack in her maternal grandmother; Stroke in her maternal grandmother. Otherwise pertinent FHx was reviewed and not pertinent to current issue.    Social History:  reports that she has never smoked. She has never used smokeless tobacco. She reports that she does not drink alcohol and does not use drugs.    Home Medications:  Prior to Admission Medications     Prescriptions Last Dose Informant Patient Reported? Taking?    acetaminophen (TYLENOL) 325 MG tablet   Yes Yes    Take 2 tablets by mouth Every 4 (Four) Hours As Needed.    allopurinol (ZYLOPRIM) 100 MG tablet   Yes Yes    Take 5 tablets by mouth Daily.    ALPRAZolam (Xanax) 0.5 MG tablet   No Yes    Take 1 tablet by mouth At Night As Needed for Anxiety.    budesonide-formoterol (SYMBICORT) 160-4.5 MCG/ACT inhaler   No Yes    Inhale 2 puffs 2 (Two) Times a Day.    citalopram (CeleXA) 10 MG tablet   Yes Yes    Take 1 tablet by mouth Daily.    dapagliflozin Propanediol (Farxiga) 10 MG tablet   No Yes    Take 10 mg by mouth Daily.    furosemide (LASIX) 40 MG tablet   Yes Yes    Take 1 tablet by mouth Daily.    gabapentin (NEURONTIN) 300 MG capsule   Yes Yes    Take 1 capsule by mouth Daily.    hydrOXYzine (ATARAX) 25 MG tablet   Yes Yes    Take 1 tablet by mouth Every 8 (Eight) Hours As Needed.    Insulin Glargine (BASAGLAR KWIKPEN) 100 UNIT/ML injection pen   No Yes    Inject 20 Units under the skin into the appropriate area as directed Daily.    Insulin Lispro (Admelog) 100 UNIT/ML injection   Yes Yes    Inject 6 Units under the skin into the appropriate area as directed 3 (Three) Times a Day As Needed for High Blood Sugar.    losartan (COZAAR) 25 MG tablet   No Yes    Take 1 tablet by mouth Daily.    metoprolol succinate XL (TOPROL-XL) 25 MG 24 hr tablet   No Yes    Take 1 tablet by mouth Daily.    mirtazapine (REMERON) 15 MG tablet   No Yes    Take 1 tablet by mouth every night at bedtime.    ondansetron ODT (ZOFRAN-ODT) 8 MG  disintegrating tablet   No Yes    Place 1 tablet on the tongue Every 8 (Eight) Hours As Needed for Nausea or Vomiting.    Continuous Blood Gluc Sensor (FreeStyle Zahira 2 Sensor) misc   Yes No    levoFLOXacin (Levaquin) 500 MG tablet   No No    Take 1 tablet by mouth Daily.    potassium chloride (K-DUR,KLOR-CON) 20 MEQ CR tablet   Yes No    Take 1 tablet by mouth Daily.    prochlorperazine (COMPAZINE) 10 MG tablet   No No    Take 1 tablet by mouth Every 6 (Six) Hours As Needed for Nausea or Vomiting.    promethazine (PHENERGAN) 25 MG tablet   Yes No    Take 1 tablet by mouth As Needed.    tiZANidine (ZANAFLEX) 4 MG tablet   Yes No    Take 1 tablet by mouth Daily.    traZODone (DESYREL) 50 MG tablet   Yes No    Take 25 mg by mouth every night at bedtime.            Allergies:  No Known Allergies    Objective       Vitals:   Temp:  [97.9 °F (36.6 °C)-98.9 °F (37.2 °C)] 98.9 °F (37.2 °C)  Heart Rate:  [] 100  Resp:  [12-18] 16  BP: (110-159)/(73-87) 137/79    Physical Exam  Vitals and nursing note reviewed.   HENT:      Head: Normocephalic and atraumatic.      Mouth/Throat:      Comments: Mouth sores  Eyes:      Extraocular Movements: Extraocular movements intact.      Pupils: Pupils are equal, round, and reactive to light.   Cardiovascular:      Rate and Rhythm: Normal rate and regular rhythm.      Pulses: Normal pulses.      Heart sounds: Normal heart sounds.   Pulmonary:      Effort: Pulmonary effort is normal.      Breath sounds: Normal breath sounds.   Abdominal:      General: Bowel sounds are normal.      Palpations: Abdomen is soft.      Tenderness: There is no abdominal tenderness.   Musculoskeletal:         General: Normal range of motion.   Skin:     General: Skin is warm and dry.      Findings: Bruising present.   Neurological:      Mental Status: She is alert and oriented to person, place, and time.   Psychiatric:         Mood and Affect: Mood normal.         Behavior: Behavior normal.          Result  Review    Result Review:  I have personally reviewed the results from the time of this admission to 5/17/2023 22:29 EDT and agree with these findings:  [x]  Laboratory  []  Microbiology  [x]  Radiology  []  EKG/Telemetry   []  Cardiology/Vascular   []  Pathology  [x]  Old records      Assessment & Plan        Active Hospital Problems:  Active Hospital Problems    Diagnosis    • **Pancytopenia    • Traumatic subdural hemorrhage without loss of consciousness    • Chronic pain    • NICM (nonischemic cardiomyopathy)    • Type 2 diabetes mellitus with diabetic polyneuropathy, with long-term current use of insulin    • Depression with anxiety    • History of pulmonary embolism    • Medically noncompliant    • CML (chronic myelocytic leukemia)      Plan:     Pancytopenia  -WBC 2.6, Hgb 8.6, platelets 7  -received 1 unit of platelets in ambulatory care and developed a cough, chills, and nausea. Transfusion was stopped and patient was given IV decadron and bendadryl and sent to the ER  -plts now 8  -spoke with oncology who recommended pretreatment with IV solumedrol, IV benadryl, and tylenol prior to another unit of platelets  -start levaquin 500mg daily x7 days for prophylaxis per oncology notes  -neutropenic precautions  -oncology consulted     Remote subdural hemorrhage  -CT head: bilateral subdural hygromas suggesting remote subdural hemorrhage.  There is local mass effect with sulcal and effacement and decreased of the size of lateral ventricles but no evidence of herniation.  No acute intracranial hemorrhage.  -pt reports a fall 2-3 weeks ago per patient  -no neurological deficits on exam  -continue neuro checks   -consult neurosurgery   -repeat CT head in am    CML  -per oncology notes no longer on Gleevec or hydroxyurea currently on Ponatinib 15mg followed     DM type 2  -glucose 375 at 11am  -check glucose AC/HS  -cont home lantus, premeal insulin, and SSI    Nonischemic Cardiomyopathy EF 44%  -cont home GDMT: MARGARITA,  ARB, SGTL2, lasix    Chronic pain  -pain pump in place, managed by pain management outpatient. Pt just had pump adjusted yesterday     Anxiety  -cont home xanax      DVT prophylaxis: contraindicated due to plts of 8    CODE STATUS:    Level Of Support Discussed With: Patient  Code Status (Patient has no pulse and is not breathing): CPR (Attempt to Resuscitate)  Medical Interventions (Patient has pulse or is breathing): Full Support    Admission Status:  I believe this patient meets inpatient status.    I discussed the patient's findings and my recommendations with patient, family and nursing staff.    This patient has been examined wearing appropriate Personal Protective Equipment. 05/17/23      Electronically signed by JP Richard, 05/17/23, 10:30 PM EDT.    Electronically signed by Mónica Tillman MD at 05/19/23 0155

## 2023-05-31 NOTE — THERAPY TREATMENT NOTE
"Subjective: Pt agreeable to therapeutic plan of care. Pt states that she is in too much pain in her LLE to walk    Objective:     Bed mobility - Independent  Transfers - Independent  Ambulation - N/A or Not attempted.    Therapeutic Exercise - 5 Reps B LE AROM; isometric hip ADD;  sitting EOB   In supine, performed QS x 5 each LE.     Vitals: WNL    Pain: 10 VAS   Location: LLE and generalized pain  Intervention for pain: Repositioned and Increased Activity; encouraged deep breathing as pt tends to hold breath with all activities.     Education: Provided education on the importance of mobility in the acute care setting and Transfer Training    Assessment: Nieves Mc presents with functional mobility impairments related to Pancytopernia which indicate the need for skilled intervention. Tolerating session today without incident but liimted by acute and chronic pain.  Will continue to follow and progress as tolerated. She would benefit from HH PT at discharge.     Plan/Recommendations:   Low Intensity Therapy recommended post-acute care - This is recommended as therapy feels this patient would require 2-3 visits per week. OP or HH would be the best option depending on patient's home bound status. Consider, if the patient has other  \"skilled\" needs such as wounds, IV antibiotics, etc. Combined with \"low intensity\" could also equate to a SNF. If patient is medically complex, consider LTAC.. Pt requires no DME at discharge.     Pt desires Home with family assist and and Home Health at discharge. Pt cooperative; agreeable to therapeutic recommendations and plan of care.           Modified Manuel: N/A = No pre-op stroke/TIA    Post-Tx Position: Supine with HOB Elevated and Call light and personal items within reach  PPE: gloves, surgical mask and gown     "

## 2023-05-31 NOTE — PROGRESS NOTES
"Chp welcomed. Pt and children (adult son and daughter) were tidying pt room and gathering belongings. Pt shared that she will be d/c this afternoon. Chp asked what type of support pt will receive. Pt stated \"doctors said there is nothing else they can do. I'm going home so my children can watch me die.\" Chp provided emotional support and inquired about pt's medical care at home. Pt stated that she is now part of a palliative care program and will be receiving support through their home-health service. Pt was able to voice frustrations around her feelings of powerlessness. Pt offered gratitude for [facility] visits and support. [facility] remains available for support as requested.   "

## 2023-05-31 NOTE — CASE MANAGEMENT/SOCIAL WORK
Patient: Fidencio Luciano    Procedure Summary     Date: 05/17/22 Room / Location: [facility] OR  /  [facility] OR    Anesthesia Start: 1146 Anesthesia Stop: 1222    Procedures:       ESOPHAGOGASTRODUODENOSCOPY WITH BIOPSY (N/A Esophagus)      COLONOSCOPY (N/A ) Diagnosis:       Iron deficiency anemia due to chronic blood loss      Early satiety      Diarrhea, unspecified type      Weight loss, abnormal      (Iron deficiency anemia due to chronic blood loss [D50.0])      (Early satiety [R68.81])      (Diarrhea, unspecified type [R19.7])      (Weight loss, abnormal [R63.4])    Surgeons: Geno Vernon MD Provider: Kam Sandoval MD    Anesthesia Type: general ASA Status: 3          Anesthesia Type: general    Vitals  Vitals Value Taken Time   /84 05/17/22 1253   Temp 97.5 °F (36.4 °C) 05/17/22 1223   Pulse 70 05/17/22 1253   Resp 16 05/17/22 1253   SpO2 96 % 05/17/22 1253           Post Anesthesia Care and Evaluation    Patient location during evaluation: PACU  Patient participation: complete - patient participated  Level of consciousness: awake  Pain score: 1  Pain management: adequate  Airway patency: patent  Anesthetic complications: No anesthetic complications  PONV Status: controlled  Cardiovascular status: acceptable and blood pressure returned to baseline  Respiratory status: acceptable and room air  Hydration status: acceptable    Comments: Patient comfortable with discharge at this time.       Social Work Assessment   Mt     Patient Name: Nieves Mc  MRN: [id number]  Today's Date: 5/31/2023    Admit Date: 5/17/2023     Discharge Plan       Row Name 05/31/23 1611       Plan    Plan Comments MILANAW consulted by CM for follow-up on advance directives and family paid caregiver resources. Advance directive for a Health Care Representative/Living Will were completed last week by palliative. Emailed resources for paid caregiver with family member to daughter at [email or web address].        BLANCHE Bowman    Phone: [phone]  Cell: [phone]  Fax: [phone]  [email or web address]

## 2023-05-31 NOTE — CASE MANAGEMENT/SOCIAL WORK
Continued Stay Note  [facility]     Patient Name: Nieves cM  MRN: [id number]  Today's Date: 5/31/2023    Admit Date: 5/17/2023    Plan: Anticipate routine home with [facility] (accepted, order in place). Rollator and BSC delivered by [facility] (orders noted).   Discharge Plan     Row Name 05/31/23 1526       Plan    Plan Anticipate routine home with [facility] (accepted, order in place). Rollator and BSC delivered by [facility] (orders noted).    Patient/Family in Agreement with Plan yes    Plan Comments DC orders noted. CM contacted [facility] liaison Evelyn RUSSELL to re-evaluate and added in basket. Reported that Dr. Lerma notified nurse of request for CBC to be completed by  every other day. Evelyn confirmed acceptance and reported that she spoke to patient who was scheduled to dc to [facility]. Reported that they would have  from  follow-up with patient as well. CM requested orders and verbiage from MD for HH, rollator, and BSC. CM notified [facility] of dc orders. Liaison reported cost of $61.25 for total after insurance for rollator and BSC. CM updated Shama Willson of anticipated discharge. CM met with patient at bedside to provide IMM letter and discuss dc plan. Notified her of HH acceptance and of DME cost. Patient reported that she could not pay at this time but would be able to pay once she gets her check on the 3rd. Snow liajered updated who reported patient will be billed at later time. DME delivered.    Final Discharge Disposition Code 06 - home with home health care    Final Note [facility]           Expected Discharge Date and Time     Expected Discharge Date Expected Discharge Time    May 31, 2023         Case Management Discharge Note      Final Note: [facility]      Selected Continued Care - Admitted Since 5/17/2023         Home Medical Care     Service Provider Selected Services Address Phone Fax Patient Preferred    [facility] [address], [place] [address] [phone]                    Community Resources Coordination complete.    Service Provider Selected Services Address Phone Fax Patient Preferred    [facility] [address] [place] [address] [phone]                   Transportation Services  Private: Car    Final Discharge Disposition Code: 06 - home with home health care     Jaida Charles RN     Office Phone: [phone]  Office Cell: [phone]     2

## 2023-05-31 NOTE — DISCHARGE SUMMARY
[facility]   DISCHARGE SUMMARY    Patient Name: Nieves Mc  : 1975  MRN: [id number]    Date of Admission: 2023  Date of Discharge:  23    Primary Care Physician: Alida Latham APRN      Presenting Problem:   CML (chronic myelocytic leukemia) [C92.10]  Petechiae [R23.3]  Thrombocytopenia [D69.6]  Abnormal CT of the head [R93.0]  Pancytopenia [D61.818]    Active and Resolved Hospital Problems:  Active Hospital Problems    Diagnosis POA   • **Pancytopenia [D61.818] Yes   • Bilateral subdural hematomas [S06.5XAA] Unknown   • Lumbar radiculopathy [M54.16] Unknown   • Traumatic subdural hemorrhage without loss of consciousness [S06.5X0A] Yes   • Chronic pain [G89.29] Yes   • NICM (nonischemic cardiomyopathy) [I42.8] Yes   • Type 2 diabetes mellitus with diabetic polyneuropathy, with long-term current use of insulin [E11.42, Z79.4] Not Applicable   • Depression with anxiety [F41.8] Yes   • History of pulmonary embolism [Z86.711] Yes   • Medically noncompliant [Z91.199] Not Applicable   • CML (chronic myelocytic leukemia) [C92.10] Yes      Resolved Hospital Problems   No resolved problems to display.         Hospital Course     Hospital Course:  Nieves Mc is a 47 y.o. female who presented to [facility] on 2023 with complaints of nausea, vomiting, coughing and chills during a platelet transfusion.  Past medical history of CML on ponatinib, cardiomyopathy, chronic pain, uncontrolled type 1 diabetes, chronic anemia, insomnia, anxiety and depression, pulmonary embolism on chronic anticoagulation with Xarelto, medical noncompliance.  Evaluation in the ED: Patient reported a fall 2 to 3 weeks ago during which she hit the right side of her head a CT of the head was done showing bilateral subdural hygromas suggesting remote subdural hemorrhage; local mass effect with sulcal and effacement and decreased of the size of lateral ventricles but no  evidence of herniation; no acute intracranial hemorrhage.  The patient was admitted for further treatment of her pancytopenia and remote subdural hemorrhage with plans for pretreatment with IV Solu-Medrol, Tylenol and Benadryl prior to transfusing another unit of platelets.  Heme-onc was consulted.  Patient also evaluated by neurosurgery, no surgical intervention needed.  Patient remains at baseline mental status.  She received multiple transfusions for the platelets.  Platelets slowly improving.  She follows with pain management outpatient at [facility] and has pain pump.  Difficult to control her pain here, pain was managed per oncology here.  Patient is clinically improved, platelets continue to improve.  Discussed with heme-onc and no further inpatient work-up required at this time.  Patient is medically stable to discharge home today with follow-up with PCP, neurosurgery, oncology, and pain management as outpatient.    The patient is physically incapable of utilizing regular toilet facilities. Use of a bedside commode is necessary as they are confined to one room      DISCHARGE Follow Up Recommendations for labs and diagnostics:   Follow-up with PCP, oncology, neurosurgery, and pain management    Reasons For Change In Medications and Indications for New Medications:  Midodrine added.    Day of Discharge     Vital Signs:  Temp:  [97.7 °F (36.5 °C)-99.2 °F (37.3 °C)] 98.5 °F (36.9 °C)  Heart Rate:  [104-119] 111  Resp:  [10-17] 13  BP: ()/(51-76) 116/76    Physical Exam:  General: Awake, alert, NAD  Eyes: PERRL, EOMI, conjunctivae are clear  Cardiovascular: Regular rate and rhythm, no murmurs  Respiratory: Clear to auscultation bilaterally, no wheezing or rales, unlabored breathing  Abdomen: Soft, nontender, positive bowel sounds, no guarding  Neurologic: A&O, CN grossly intact, moves all extremities spontaneously  Musculoskeletal: Normal range of motion, no other gross deformities  Skin: Warm, dry,  intact        Pertinent  and/or Most Recent Results     LAB RESULTS:      Lab 05/31/23  0023 05/29/23 2258 05/29/23  0102 05/28/23  0903 05/27/23  0851 05/26/23  0032   WBC 2.70* 2.70* 0.80* 1.70* 1.10* 1.60*   HEMOGLOBIN 8.9* 8.8* 8.5* 7.1* 8.3* 8.4*   HEMATOCRIT 26.3* 26.6* 26.1* 21.3* 25.2* 25.2*   PLATELETS 25* 22* 20* 27* 33* 9*   NEUTROS ABS 0.08* 0.14*  --  0.10* 0.13* 0.03*   MCV 82.7 83.6 84.8 82.4 84.0 83.8         Lab 05/29/23 2258 05/29/23 0102 05/28/23  0903 05/26/23  0032 05/24/23  2248   SODIUM 144 136 139 139 140   POTASSIUM 3.8 4.9 4.7 4.8 4.5   CHLORIDE 101 96* 101 101 101   CO2 28.0 24.0 26.0 28.0 26.0   ANION GAP 15.0 16.0* 12.0 10.0 13.0   BUN 44* 44* 32* 27* 32*   CREATININE 0.83 1.14* 0.82 0.73 0.99   EGFR 87.6 59.9* 88.9 102.2 70.9   GLUCOSE 124* 542* 328* 287* 207*   CALCIUM 10.0 9.4 9.6 9.6 8.7                     Lab 05/28/23  1304   ABO TYPING A   RH TYPING Positive   ANTIBODY SCREEN Negative         Brief Urine Lab Results  (Last result in the past 365 days)      Color   Clarity   Blood   Leuk Est   Nitrite   Protein   CREAT   Urine HCG        05/17/23 1815 Dark Yellow  Comment: Result checked     Clear   Negative   Negative   Negative   30 mg/dL (1+)               Microbiology Results (last 10 days)     ** No results found for the last 240 hours. **          XR Ribs Bilateral 3 View    Result Date: 5/25/2023  Impression: No acute findings. Electronically Signed: Hari Ray  5/25/2023 11:40 PM EDT  Workstation ID: [id number]    CT Head Without Contrast    Result Date: 5/23/2023  Impression: Impression: Chronic bilateral subdural hygromas, right greater than left, unchanged from 5/18/2023. No acute intracranial finding. Electronically Signed: Heather Kelsey  5/23/2023 3:11 PM EDT  Workstation ID: [id number]    CT Head Without Contrast    Result Date: 5/18/2023  Impression: Impression: Persistent bilateral chronic subdural hematoma/hygromas are present, 7 mm in maximal thickness on the  right with some mild underlying sulcal effacement. While nonspecific and with potentially numerous etiologies, findings could be seen with intracranial hypotension or possibly remote or unreported trauma. Consider contrast-enhanced MRI when able, to further evaluate. Electronically Signed: Apolinar Byrne  5/18/2023 5:38 AM EDT  Workstation ID: [id number]    CT Head Without Contrast    Result Date: 5/17/2023  Impression: Bilateral subdural hygromas suggesting remote subdural hemorrhage. There is local mass effect with sulcal effacement and decreased of the size of the lateral ventricles but no evidence of herniation at this time No acute intracranial hemorrhage appreciated. Recommend follow-up, further evaluation as clinically indicated Findings were discussed with the ordering provider JP Kent at the time of interpretation Electronically Signed: Freddy Toribio  5/17/2023 7:28 PM EDT  Workstation ID: [id number]      Results for orders placed during the hospital encounter of 11/03/22    Duplex Venous Lower Extremity - Bilateral CAR    Interpretation Summary  •  Chronic right lower extremity superficial thrombophlebitis noted in the small saphenous.  •  All other veins appeared normal bilaterally.      Results for orders placed during the hospital encounter of 11/03/22    Duplex Venous Lower Extremity - Bilateral CAR    Interpretation Summary  •  Chronic right lower extremity superficial thrombophlebitis noted in the small saphenous.  •  All other veins appeared normal bilaterally.      Results for orders placed during the hospital encounter of 03/10/23    Adult Transthoracic Echo Complete W/ Cont if Necessary Per Protocol    Interpretation Summary  •  Left ventricular systolic function is mildly decreased. Calculated left ventricular EF = 44%  •  The left ventricular cavity is moderately dilated.  •  Left ventricular diastolic function was indeterminate.  •  Severe tricuspid valve regurgitation is present.  •   Estimated right ventricular systolic pressure from tricuspid regurgitation is moderately elevated (45-55 mmHg).  •  The inferior vena cava is dilated. IVC inspiratory collapse is absent.      Labs Pending at Discharge:      Procedures Performed           Consults:   Consults     Date and Time Order Name Status Description    5/18/2023  8:14 AM Hematology & Oncology Inpatient Consult      5/18/2023 12:34 AM Hematology and Oncology (on-call MD unless specified) Completed     5/18/2023 12:08 AM Inpatient Neurosurgery Consult Completed     5/17/2023  7:44 PM Hospitalist (on-call MD unless specified)              Discharge Details        Discharge Medications      New Medications      Instructions Start Date   midodrine 10 MG tablet  Commonly known as: PROAMATINE   10 mg, Oral, Every 8 Hours         Continue These Medications      Instructions Start Date   acetaminophen 325 MG tablet  Commonly known as: TYLENOL   650 mg, Oral, Every 4 Hours PRN      allopurinol 100 MG tablet  Commonly known as: ZYLOPRIM   500 mg, Oral, Daily      ALPRAZolam 0.5 MG tablet  Commonly known as: Xanax   0.5 mg, Oral, Nightly PRN      BASAGLAR KWIKPEN 100 UNIT/ML injection pen   20 Units, Subcutaneous, Daily      citalopram 10 MG tablet  Commonly known as: CeleXA   1 tablet, Oral, Daily      Farxiga 10 MG tablet  Generic drug: dapagliflozin Propanediol   10 mg, Oral, Daily      FreeStyle Zahira 2 Sensor misc   No dose, route, or frequency recorded.      furosemide 40 MG tablet  Commonly known as: LASIX   40 mg, Oral, Daily      gabapentin 300 MG capsule  Commonly known as: NEURONTIN   1 capsule, Oral, 3 Times Daily      hydrOXYzine 25 MG tablet  Commonly known as: ATARAX   25 mg, Oral, Every 8 Hours PRN      metoprolol succinate XL 25 MG 24 hr tablet  Commonly known as: TOPROL-XL   25 mg, Oral, Daily      mirtazapine 15 MG tablet  Commonly known as: REMERON   15 mg, Oral, Every Night at Bedtime      ondansetron ODT 8 MG disintegrating  tablet  Commonly known as: ZOFRAN-ODT   8 mg, Translingual, Every 8 Hours PRN      pain  patient supplied pump   Intrathecal, Continuous, Active pump Prescriber: Dr. Shelton - pain management  Med name/ concentration: Dilaudid  Last refill: yesterday  Lockout period: every 4 hours       prochlorperazine 10 MG tablet  Commonly known as: COMPAZINE   10 mg, Oral, Every 6 Hours PRN      promethazine 25 MG tablet  Commonly known as: PHENERGAN   1 tablet, Oral, As Needed      Symbicort 160-4.5 MCG/ACT inhaler  Generic drug: budesonide-formoterol   2 puffs, Inhalation, 2 Times Daily - RT      tiZANidine 4 MG tablet  Commonly known as: ZANAFLEX   1 tablet, Oral, Daily      traZODone 50 MG tablet  Commonly known as: DESYREL   25 mg, Oral, Every Night at Bedtime         Stop These Medications    levoFLOXacin 500 MG tablet  Commonly known as: Levaquin     losartan 25 MG tablet  Commonly known as: COZAAR     potassium chloride 20 MEQ CR tablet  Commonly known as: K-DUR,KLOR-CON            No Known Allergies      Discharge Disposition:  Home or Self Care    Diet:  Hospital:  Diet Order   Procedures   • Diet: Diabetic Diets; Consistent Carbohydrate; Neutropenic/Low Microbial; Texture: Regular Texture (IDDSI 7); Fluid Consistency: Thin (IDDSI 0)         Discharge Activity:         CODE STATUS:  Code Status and Medical Interventions:   Ordered at: 05/20/23 2146     Medical Intervention Limits:    NO intubation (DNI)    NO artificial nutrition     Level Of Support Discussed With:    Patient    Next of Kin (If No Surrogate)     Code Status (Patient has no pulse and is not breathing):    CPR (Attempt to Resuscitate)     Medical Interventions (Patient has pulse or is breathing):    Limited Support     Release to patient:    Routine Release         Future Appointments   Date Time Provider Department Center   6/1/2023 10:00 AM [facility] CORYDON CT 1 BH [facility]   6/2/2023 11:30 AM Austen Patel MD [facility]   6/6/2023 11:00 AM  Meghann Lerma MD [facility] ONC NA [facility]   6/6/2023 11:00 AM LAB MD [facility] LAB NA  [facility]   6/9/2023 11:15 AM Jane Hopper MD [facility] NA           Time spent on Discharge including face to face service:  35 minutes    Signature:    Electronically signed by Martha Shields DO, 05/31/23, 10:16 AM EDT.      Part of this note may be an electronic transcription/translation of spoken language to printed text using the Dragon Dictation System.

## 2023-05-31 NOTE — PROGRESS NOTES
Hematology/Oncology Inpatient Progress Note    PATIENT NAME: Nieves Mc  : 1975  MRN: [id number]    CHIEF COMPLAINT:     HISTORY OF PRESENT ILLNESS:      Nieves Mc is a 47 y.o. female who presented to [facility] on 2023 with complaints of nausea, vomiting, coughing and chills during a platelet transfusion.  Past medical history of CML on ponatinib, cardiomyopathy, chronic pain, uncontrolled type 1 diabetes, chronic anemia, insomnia, anxiety and depression, pulmonary embolism on chronic anticoagulation with Xarelto, medical noncompliance.       Evaluation in the ED: Patient reported a fall 2 to 3 weeks ago during which she hit the right side of her head a CT of the head was done showing bilateral subdural hygromas suggesting remote subdural hemorrhage; local mass effect with sulcal and effacement and decreased of the size of lateral ventricles but no evidence of herniation; no acute intracranial hemorrhage.  The patient was admitted for further treatment of her pancytopenia and remote subdural hemorrhage with plans for pretreatment with IV Solu-Medrol, Tylenol and Benadryl prior to transfusing another unit of platelets.        23  Hematology/Oncology was consulted on a patient known to us and followed in the office by Dr. Lerma for her diagnosis of CML.  Patient initially presented in  when she was seen in consultation while hospitalized.  At that time the patient was on imatinib.  In  she had progressive thrombocytosis and was transition to nilotinib.  After that she was lost to follow-up until she was once again seen during a hospitalization in .  She was continued on nilotinib and hydroxyurea.  In 2022 she had once again been lost to follow-up for 3 months when she presented in the office after not taking nilotinib during that time.  She had shortness of breath and cough for which a 2D echocardiogram was done and showed a reduced EF of 41 to 45%.  Due to  cardiomyopathy she was transitioned to imatinib and hydroxyurea.  In November 2022 bone marrow biopsy showed her to be 18 accelerated phase CML and that the imatinib was not controlling her malignancy well and she was initiated on ponatinib 45 mg p.o. daily.  She had significant body pain with this dose and for that reason was reduced to 30 mg p.o. daily.  She was once again lost to follow-up for approximately 6 weeks during that time she self reduced to ponatinib 15 mg daily.  She was seen in the office on 5/17/2023 at which time her platelet count was noted to be 8000 and she was severely neutropenic.  Her ponatinib was held and she was sent to the ambulatory care unit for 2 units of platelets to be transfused and a platelet level to be checked posttransfusion for further planning.     Patient has a history of CML on ponatinib.  Patient has been noncompliant with lab evaluations as recommended.  She has been taking 50 mg of ponatinib.  She comes to the office with significant pancytopenia, severe neutropenia, platelets of 7000 and anemia.  Patient was sent to the ambulatory care for platelet transfusions, she developed chills nausea but did receive platelets.  Repeat platelet check was no significantly changed at 8000 for that reason patient was sent to the ER to be admitted to the hospital.  She has a history of falls approximately 2 weeks earlier.  While in the ER she had CT of the head which showed old subdural hematoma.  Patient denies nausea vomiting fevers or chills.  She has not been able to come to the office due to social issues, transportation issues     5/26/2023 - patient was given 2 units of HLA matched platelets.   5/27/2023 - plt count improved to 33  5/29/2023 - plt count 20  He/She  has a past medical history of Bone pain, COVID-19 virus infection (01/12/2022), Diabetes mellitus, Extremity pain, Leg pain, Migraine, Pulmonary embolism, and Vision loss.     PCP: Alida Latham,  "APRN    Subjective      Patient feels better today on her pain medication.  There is no bleeding issues.    MEDICATIONS:    Scheduled Meds:  allopurinol, 500 mg, Oral, Daily  citalopram, 10 mg, Oral, Daily  empagliflozin, 25 mg, Oral, Daily  furosemide, 40 mg, Oral, Daily  gabapentin, 300 mg, Oral, TID  insulin glargine, 1-200 Units, Subcutaneous, Nightly - Glucommander  insulin lispro, 1-200 Units, Subcutaneous, 4x Daily With Meals & Nightly  metoprolol succinate XL, 25 mg, Oral, Daily  midodrine, 10 mg, Oral, Q8H  mirtazapine, 15 mg, Oral, Nightly  senna-docusate sodium, 2 tablet, Oral, BID  sodium chloride, 10 mL, Intravenous, Q12H       Continuous Infusions:  pain,        PRN Meds:  •  acetaminophen **OR** acetaminophen **OR** acetaminophen  •  ALPRAZolam  •  senna-docusate sodium **AND** polyethylene glycol **AND** bisacodyl **AND** bisacodyl  •  Calcium Replacement - Follow Nurse / BPA Driven Protocol  •  dextrose  •  dextrose  •  glucagon (human recombinant)  •  hydrocortisone  •  hydrOXYzine  •  insulin lispro  •  Magnesium Standard Dose Replacement - Follow Nurse / BPA Driven Protocol  •  melatonin  •  ondansetron **OR** ondansetron  •  oxyCODONE  •  Phosphorus Replacement - Follow Nurse / BPA Driven Protocol  •  Potassium Replacement - Follow Nurse / BPA Driven Protocol  •  prochlorperazine  •  sodium chloride  •  sodium chloride     ALLERGIES:  No Known Allergies    Objective    VITALS:   /72 (BP Location: Right arm, Patient Position: Lying)   Pulse 118   Temp 97.7 °F (36.5 °C) (Oral)   Resp 16   Ht 162.6 cm (64\")   Wt 56.7 kg (125 lb)   LMP 05/25/2022 (Approximate)   SpO2 94%   BMI 21.46 kg/m²     PHYSICAL EXAM: (performed by MD)  Physical Exam  Constitutional:       Appearance: Normal appearance. She is ill-appearing.   HENT:      Head: Normocephalic and atraumatic.      Mouth/Throat:      Mouth: Mucous membranes are moist.   Eyes:      Pupils: Pupils are equal, round, and reactive to " light.   Cardiovascular:      Rate and Rhythm: Normal rate and regular rhythm.      Pulses: Normal pulses.      Heart sounds: No murmur heard.  Pulmonary:      Effort: Pulmonary effort is normal.      Breath sounds: Normal breath sounds.   Abdominal:      General: There is no distension.      Palpations: Abdomen is soft. There is no mass.      Tenderness: There is no abdominal tenderness.   Musculoskeletal:         General: Normal range of motion.      Cervical back: Normal range of motion and neck supple.   Skin:     General: Skin is warm.      Coloration: Skin is pale.   Neurological:      General: No focal deficit present.      Mental Status: She is alert.   Psychiatric:         Mood and Affect: Mood normal.       I have reexamined the patient and the results are consistent with the previously documented exam. Meghann Lerma MD     RECENT LABS:  Lab Results (last 24 hours)     Procedure Component Value Units Date/Time    POC Glucose Once [[id number]]  (Abnormal) Collected: 05/31/23 0712    Specimen: Blood Updated: 05/31/23 0714     Glucose 236 mg/dL      Comment: Serial Number: [id number]:  [id number]       Manual Differential [[id number]]  (Abnormal) Collected: 05/31/23 0023    Specimen: Blood Updated: 05/31/23 0258     Neutrophil % 3.0 %      Lymphocyte % 96.0 %      Monocyte % 1.0 %      Neutrophils Absolute 0.08 10*3/mm3      Lymphocytes Absolute 2.59 10*3/mm3      Monocytes Absolute 0.03 10*3/mm3      Anisocytosis Slight/1+     WBC Morphology Normal     Platelet Estimate Decreased    CBC & Differential [[id number]]  (Abnormal) Collected: 05/31/23 0023    Specimen: Blood Updated: 05/31/23 0258    Narrative:      The following orders were created for panel order CBC & Differential.  Procedure                               Abnormality         Status                     ---------                               -----------         ------                     CBC Auto Differential[[id number]]         Abnormal            Final result               Scan Slide[[id number]]                                       Final result                 Please view results for these tests on the individual orders.    Scan Slide [[id number]] Collected: 05/31/23 0023    Specimen: Blood Updated: 05/31/23 0258     Scan Slide --     Comment: See Manual Differential Results       CBC Auto Differential [[id number]]  (Abnormal) Collected: 05/31/23 0023    Specimen: Blood Updated: 05/31/23 0258     WBC 2.70 10*3/mm3      RBC 3.18 10*6/mm3      Hemoglobin 8.9 g/dL      Hematocrit 26.3 %      MCV 82.7 fL      MCH 27.8 pg      MCHC 33.6 g/dL      RDW 16.8 %      RDW-SD 51.6 fl      MPV 7.1 fL      Platelets 25 10*3/mm3     Narrative:      The previously reported component NRBC is no longer being reported. Previous result was 0.2 /100 WBC (Reference Range: 0.0-0.2 /100 WBC) on 5/31/2023 at 0116 EDT.    POC Glucose Once [[id number]]  (Abnormal) Collected: 05/30/23 1957    Specimen: Blood Updated: 05/30/23 1959     Glucose 127 mg/dL      Comment: Serial Number: [id number]:  [id number]       POC Glucose Once [[id number]]  (Abnormal) Collected: 05/30/23 1657    Specimen: Blood Updated: 05/30/23 1658     Glucose 165 mg/dL      Comment: Serial Number: [id number]:  [id number]       POC Glucose Once [[id number]]  (Abnormal) Collected: 05/30/23 1140    Specimen: Blood Updated: 05/30/23 1141     Glucose 178 mg/dL      Comment: Serial Number: [id number]:  [id number]           IMAGING REVIEWED:  No radiology results for the last day    Assessment & Plan      ASSESSMENT:    1. Pancytopenia: There is progressive improvement in the blood counts though very slowly.  s/p Transfusion HLA matched platelets improvement in plt count to 33, now at 20, hb down to 7.1 yesterday, s/p transfusion of 1 unit now at 8.5.  Platelets are stable at 25,000.  2. Chronic pain: Would increase oxycodone to 10 mg since her blood pressures have improved  3. Episodes of  hypotension: Primary has discontinued losartan  4. Accelerated phase CML: She will remain off of the ponatininb for now.   5. Alloimmunization: No need for transfusion currently.  She does not respond to platelet transfusions but has responded to HLA typed platelets. No transfusion for now monitor for bleeding.  6. Bilateral subdural hematomas: No suggestion of further bleeding, no increasing headaches.  7. Back pain. Increased the dose of the oxycodone. Pain is now better controlled.  Continue current treatment  8. Patient declines hospice     PLANS    1. CBC daily while hospitalized and when discharged, will get palliative home health nurses to draw labs 3 times a week with results faxed to the office.  2. Platelets have improved with transfusion of HLA matched platelets  3. Continue pain medicines oxycodone 10 mg every 4 hours as needed  4. Discontinued losartan by primary  5. Okay to discharge today from hematology standpoint  6. Transfuse HLA matched platelets as needed.  7. Follow-up with me next week with labs done by home health        I spent 25 total minutes, face-to-face, caring for Hope today. 90% of this time involved counseling and/or coordination of care as documented within this note.

## 2023-05-31 NOTE — PLAN OF CARE
Goal Outcome Evaluation:  Plan of Care Reviewed With: patient        Progress: no change  Outcome Evaluation: Patient received am medications, including prn oxycodone. Patient is on room air. Patient has orders to d/c today, waiting for home health referral and [facility] services prior to d/c.

## 2023-06-01 ENCOUNTER — APPOINTMENT (OUTPATIENT)
Dept: CT IMAGING | Facility: HOSPITAL | Age: 48
DRG: 809 | End: 2023-06-01
Payer: MEDICARE

## 2023-06-01 ENCOUNTER — TELEPHONE (OUTPATIENT)
Dept: NEUROSURGERY | Facility: CLINIC | Age: 48
End: 2023-06-01

## 2023-06-01 ENCOUNTER — TELEPHONE (OUTPATIENT)
Dept: ONCOLOGY | Facility: CLINIC | Age: 48
End: 2023-06-01

## 2023-06-01 NOTE — TELEPHONE ENCOUNTER
Specialty Pharmacy Note: Iclusig (ponatinib)    Called and spoke with Sayra. Discussed that Sayra that pt's ponatinib is currently on hold until pt can be seen by Florentin for post hospital follow-up. Pt is currently scheduled for 6/6/23.     Sayra said her daughter was concerned that pt will not have lab draws three times per week as suggested by Florentin with HH. Called  and discussed need for CBC blood draw three times per week. Relayed information to Sayra and discussed that I would need to reach out to Florentin or JP to verify no other lab orders are needed. Sayra had other requests for home access for tomorrow's  visit - provided Sayra with  phone number.    Thank you,  Amy Aldridge, Pharm.D.

## 2023-06-01 NOTE — TELEPHONE ENCOUNTER
I called the patient to see if she has completed her CT and she stated that she did not go and that she got out of the hospital yesterday and is still not feeling well. She would like to cancel her appointment at this time and call back when she feels better. Patient stated that she is going to go back to the hospital due to her blood pressure and not feeling well.

## 2023-06-01 NOTE — TELEPHONE ENCOUNTER
Caller: Samuel Cabezas    Relationship: Mother    Best call back number: [phone]    What was the call regarding: SAMUEL CALLED TO SPEAK WITH SOMEONE REGARDING [facility]ARE. SHE SAYS HOPE HAS BEEN IN THE HOSPITAL FOR QUITE AWHILE. SHE SAYS SHE IS NOT ABLE TO GET ANY OF HER PAIN OR CHEMO MEDICATIONS. SAMUEL IS NEEDING A CALLBACK TO DISCUSS WHAT IS GOING ON, AND WHAT NEEDS TO BE DONE.

## 2023-06-01 NOTE — OUTREACH NOTE
Prep Survey      Flowsheet Row Responses   [facility] facility patient discharged from? [facility]   Is LACE score < 7 ? No   Eligibility Readm Mgmt   Discharge diagnosis Pancytopenia   Does the patient have one of the following disease processes/diagnoses(primary or secondary)? Other   Does the patient have Home health ordered? Yes   What is the Home health agency?  [facility]   Is there a DME ordered? Yes   What DME was ordered? commode chair and walker   Prep survey completed? Yes            Roxana AMAYA - Registered Nurse

## 2023-06-02 ENCOUNTER — HOME CARE VISIT (OUTPATIENT)
Dept: HOME HEALTH SERVICES | Facility: HOME HEALTHCARE | Age: 48
End: 2023-06-02
Payer: COMMERCIAL

## 2023-06-02 ENCOUNTER — LAB REQUISITION (OUTPATIENT)
Dept: LAB | Facility: HOSPITAL | Age: 48
End: 2023-06-02
Payer: MEDICARE

## 2023-06-02 ENCOUNTER — HOME CARE VISIT (OUTPATIENT)
Dept: HOME HEALTH SERVICES | Facility: HOME HEALTHCARE | Age: 48
End: 2023-06-02

## 2023-06-02 DIAGNOSIS — C92.10 CHRONIC MYELOID LEUKEMIA, BCR/ABL-POSITIVE, NOT HAVING ACHIEVED REMISSION: ICD-10-CM

## 2023-06-02 DIAGNOSIS — D61.818 OTHER PANCYTOPENIA: ICD-10-CM

## 2023-06-02 DIAGNOSIS — S06.5X0D TRAUMATIC SUBDURAL HEMORRHAGE WITHOUT LOSS OF CONSCIOUSNESS, SUBSEQUENT ENCOUNTER: ICD-10-CM

## 2023-06-02 LAB
BASOPHILS # BLD AUTO: 0 10*3/MM3 (ref 0–0.2)
BASOPHILS NFR BLD AUTO: 0.3 % (ref 0–1.5)
DEPRECATED RDW RBC AUTO: 52.1 FL (ref 37–54)
EOSINOPHIL # BLD AUTO: 0 10*3/MM3 (ref 0–0.4)
EOSINOPHIL NFR BLD AUTO: 0 % (ref 0.3–6.2)
ERYTHROCYTE [DISTWIDTH] IN BLOOD BY AUTOMATED COUNT: 17.5 % (ref 12.3–15.4)
HCT VFR BLD AUTO: 23.9 % (ref 34–46.6)
HGB BLD-MCNC: 7.9 G/DL (ref 12–15.9)
LYMPHOCYTES # BLD AUTO: 0.7 10*3/MM3 (ref 0.7–3.1)
LYMPHOCYTES NFR BLD AUTO: 86.3 % (ref 19.6–45.3)
MCH RBC QN AUTO: 28 PG (ref 26.6–33)
MCHC RBC AUTO-ENTMCNC: 33 G/DL (ref 31.5–35.7)
MCV RBC AUTO: 84.8 FL (ref 79–97)
MONOCYTES # BLD AUTO: 0 10*3/MM3 (ref 0.1–0.9)
MONOCYTES NFR BLD AUTO: 2.7 % (ref 5–12)
NEUTROPHILS NFR BLD AUTO: 0.1 10*3/MM3 (ref 1.7–7)
NEUTROPHILS NFR BLD AUTO: 10.7 % (ref 42.7–76)
NRBC BLD AUTO-RTO: 0.4 /100 WBC (ref 0–0.2)
PLATELET # BLD AUTO: 17 10*3/MM3 (ref 140–450)
PMV BLD AUTO: 7.5 FL (ref 6–12)
RBC # BLD AUTO: 2.82 10*6/MM3 (ref 3.77–5.28)
WBC NRBC COR # BLD: 0.8 10*3/MM3 (ref 3.4–10.8)

## 2023-06-02 PROCEDURE — 85025 COMPLETE CBC W/AUTO DIFF WBC: CPT | Performed by: INTERNAL MEDICINE

## 2023-06-02 NOTE — PROGRESS NOTES
Hematology/Oncology Outpatient Follow Up    PATIENT NAME:Nieves Mc  :1975  MRN: [id number]  PRIMARY CARE PHYSICIAN: Alida Latham APRN  REFERRING PHYSICIAN: Alida Latham, *    Chief Complaint   Patient presents with    Follow-up     CML (chronic myelocytic leukemia)        HISTORY OF PRESENT ILLNESS:      Patient is a 47 y.o. female with PMH significant for CML on treatment with Imatinib.  Patient stated that she typically feels poorly with Imatinib with frequent nausea and vomiting.  Also complained of weakness and fatigue.  Patient presented to ED on 10/22/18 with complaints of an elevated blood sugar around 400.  Stated she felt poorly and has had a productive cough with yellow sputum.  Complained of nausea and stated she was unable to keep any food down anytime she ate.  The patient reported subjective fever without chills.  She stated she had been coughing so hard that she was vomiting.  She denied hematemesis.  Denied diarrhea, constipation or blood in her stool.       Patient’s chest x-ray showed no evidence of acute pulmonary embolus.  The patient has ground-glass opacities throughout the lungs.  There is some air trapping noted which would appear to be   infectious.  Patient was started on antibiotics.      Hem/Onc was consulted on 10/23/18 for co-management of patient with CML.  Patient was seen by Dr. Lerma on 10/12 on previous admission for patient reported CML on Imatinib (Gleevec).  Patient reports she is under the care of Dr. Roach at [facility] in [place].  Patient was seen by Dr. Lerma in May 2018 when patient presented with hematuria, which was attributed to her Imatinib.  Dr. Lerma followed during hospitalization but then patient returned to Dr. Roach for her usual follow up.  She was again seen on 10/12.  Patient is stating she will switch to Dr. Lerma.    Review of Dr. Roach’s note:  On 18 patient had BCR-abl which was 61.326.  Patient had  been on Imatinib 400 mg daily.  She had presented in March 2018 with WBC of 24, hemoglobin 13.1, platelet count of 1,067,000.  Patient apparently had follow up BCR-abl in August 2018, results are not available for review.  Her bone marrow aspiration and biopsy was also performed at that time is currently not available for review.    11/6/18 - .  Uric acid 6.5.  BCR-abl by RT-PCR was measured at 3.36%.    Due to thrombocytosis, patient was placed on Hydroxyurea 500 mg twice a day on 12/11/18.  Platelet count juana to 900,000.  Patient was noncompliant with CBCs that were recommended.    Patient was asked to return to the office after not being able to reach her.   12/27/18 - Bone marrow aspiration and biopsy was consistent with chronic myeloid leukemia.  BCR-abl positive, chronic phase.  ABL kinase mutational analysis is currently pending on the bone marrow.    1/3/19 - Repeat CBC, WBC 17, hemoglobin 11.9, platelet count 1,072,000.  Hydroxyurea was increased to 1 gm twice a day with follow up CBC.    1/6/19 - Follow up CBC showed progressive increase in platelet to 1.1 million.  Patient was offered admission to the hospital, which she declined.  Hydroxyurea was increased to 1 gm three   times a day as of 1/6/19.   1/7/19 - EKG shows sinus tachycardia.   milliseconds.    ABL kinase on peripheral blood was ordered on 1/11/19.  Based on sequence analysis, there was no mutation detected.    2/12/19 - Patient was initiated on Tasigna 300 mg twice a day.  2/13/19 - Urinalysis was negative for hematuria.   2/22/19 -  milliseconds.  3/13/19 - EKG with QTC is 413 milliseconds.  Nonspecific changes noted.    4/18/19 - EKG showed QTC prolonged to 453 milliseconds.  This is changed from prior.  4/18/19 - Free T4 normal at 0.91.  Magnesium was 1.7.  BUN is 6.  Creatinine 0.7.  Bilirubin 2.2.  Phosphorous normal 3.7.  TSH 0.43, normal.  Free T3 was normal at 3.47.  Ionized calcium   normal at 1.23.  BCR-abl was  0.522%.    5/7/19 - EKG showed QTC of 441 milliseconds.  QT was 366.     Patient has been lost to follow-up since 2019 for CML.  Patient states that she lost her insurance.  She also does not have any primary care physician at this time.  She is diabetic on insulin.  Patient was recently admitted to the hospital for pneumonia due to COVID-19 infection.  At that time patient made a decision to become compliant with treatment.  She is currently on to Cigna 300 mg p.o. twice a day.  She is also on hydroxyurea 1 g twice a day.  Overall she feels better, she has more energy.  3/1/2022 white count is 53.92, hemoglobin 11.7 and platelets are 472    6/1/2022: Patient has not been seen since March 2022.  Also verified that she has not been taking to Tasigna since February 2022.  She comes in today with cough for 1 and half months, shortness of breath, denies fevers.  6/30/2022 patient had 2D echocardiogram which showed an EF of 41 to 45%.  Left ventricular cavity is mildly dilated.  She was diagnosed with nonischemic cardiomyopathy    11/18/2022: Patient was transferred from [facility] to [facility] due to cardiac failure.  She was then seen by NP [facility] hematology department.  Patient underwent tumor aspiration and biopsy at that time did not show chronic myeloid leukemia in accelerated phase markedly hypercellular marrow with megakaryocytic and myeloid hyperplasia with atypia and increased basophils blasts are not increased less than 1% moderate reticulin fibrosis.  According to the patient hydroxyurea was discontinued she was prescribed Gleevec 600 mg daily but she has only been taking 400 mg as she cannot find other milligram tablets.  She is already been pump inserted into her pelvic area.  She continues to experience nausea which resulted in her not taking the baclofen she should.  1/12/2023: WBC 17.64, hemoglobin 10.2, MCV 88.8, platelets 458,000.  On Gleevec 600 mg daily and hydroxyurea  500 mg twice daily.  2023: WBC 10.67, hemoglobin 10.0, MCV 86.4, platelets 370,000.  Patient on Gleevec 600 mg daily and hydroxyurea 500 mg once a day.  Patient instructed at the visit to discontinue her hydroxyurea.  2023: WBC 17.75, hemoglobin 10.4, MCV 87.2, platelets 425,000.  On Gleevec 600 mg daily.  3/10/2023: 2D echocardiogram showing EF of 44% EKG showing sinus tachycardia QTc of 491    Past Medical History:   Diagnosis Date    Bone pain     COVID-19 virus infection 2022    Diabetes mellitus     Extremity pain     Carlin. legs pain    Leg pain     left leg greater    Migraine     Pulmonary embolism     Vision loss     doing surgery       Past Surgical History:   Procedure Laterality Date    BONE MARROW BIOPSY      BREAST SURGERY      BRONCHOSCOPY N/A 2022    Procedure: BRONCHOSCOPY bilateral lung washing;  Surgeon: Charlene Camara MD;  Location: [facility] ENDOSCOPY;  Service: Pulmonary;  Laterality: N/A;  post: rule out infection vs transfusion lung injury     SECTION      CHOLECYSTECTOMY      EYE SURGERY      laser surgery due  to hemmorage--- 2021-- another surgery  lt eye11/15/21    RETINAL DETACHMENT SURGERY      SPINE SURGERY      Lombardi spinal block    TUBAL ABDOMINAL LIGATION           Current Outpatient Medications:     acetaminophen (TYLENOL) 325 MG tablet, Take 2 tablets by mouth Every 4 (Four) Hours As Needed., Disp: , Rfl:     allopurinol (ZYLOPRIM) 100 MG tablet, Take 5 tablets by mouth Daily. Indications: Disorder of Excessive Uric Acid in the Blood, Disp: , Rfl:     ALPRAZolam (Xanax) 0.5 MG tablet, Take 1 tablet by mouth At Night As Needed for Anxiety., Disp: 30 tablet, Rfl: 0    budesonide-formoterol (SYMBICORT) 160-4.5 MCG/ACT inhaler, Inhale 2 puffs 2 (Two) Times a Day., Disp: 10.2 g, Rfl: 1    citalopram (CeleXA) 10 MG tablet, Take 1 tablet by mouth Daily. Indications: Generalized Anxiety Disorder, Disp: , Rfl:     Continuous Blood Gluc Sensor (FreeStyle Zahira 2  Sensor) misc, , Disp: , Rfl:     dapagliflozin Propanediol (Farxiga) 10 MG tablet, Take 10 mg by mouth Daily., Disp: 90 tablet, Rfl: 3    furosemide (LASIX) 40 MG tablet, Take 1 tablet by mouth Daily. Indications: Edema, Disp: , Rfl:     gabapentin (NEURONTIN) 300 MG capsule, Take 1 capsule by mouth 3 (Three) Times a Day. Indications: Diabetes with Nerve Disease, Disp: , Rfl:     hydrOXYzine (ATARAX) 25 MG tablet, Take 1 tablet by mouth Every 8 (Eight) Hours As Needed. Indications: Feeling Anxious, Disp: , Rfl:     Insulin Glargine (BASAGLAR KWIKPEN) 100 UNIT/ML injection pen, Inject 20 Units under the skin into the appropriate area as directed Daily., Disp: 10 mL, Rfl: 3    metoprolol succinate XL (TOPROL-XL) 25 MG 24 hr tablet, Take 1 tablet by mouth Daily., Disp: 90 tablet, Rfl: 3    midodrine (PROAMATINE) 10 MG tablet, Take 1 tablet by mouth Every 8 (Eight) Hours., Disp: 90 tablet, Rfl: 0    mirtazapine (REMERON) 15 MG tablet, Take 1 tablet by mouth every night at bedtime., Disp: 30 tablet, Rfl: 2    ondansetron ODT (ZOFRAN-ODT) 8 MG disintegrating tablet, Place 1 tablet on the tongue Every 8 (Eight) Hours As Needed for Nausea or Vomiting., Disp: 20 tablet, Rfl: 2    pain patient supplied pump, by Intrathecal route Continuous. Active pump Prescriber: Dr. Shelton - pain management  Med name/ concentration: Dilaudid  Last refill: yesterday  Lockout period: every 4 hours   Indications: chronic pain, Disp: , Rfl:     prochlorperazine (COMPAZINE) 10 MG tablet, Take 1 tablet by mouth Every 6 (Six) Hours As Needed for Nausea or Vomiting., Disp: 30 tablet, Rfl: 1    promethazine (PHENERGAN) 25 MG tablet, Take 1 tablet by mouth As Needed. Indications: Nausea and Vomiting, Disp: , Rfl:     tiZANidine (ZANAFLEX) 4 MG tablet, Take 1 tablet by mouth Daily. Indications: Musculoskeletal Pain, Disp: , Rfl:     traZODone (DESYREL) 50 MG tablet, Take 25 mg by mouth every night at bedtime. Indications: Trouble Sleeping, Disp: ,  Rfl:     oxyCODONE (ROXICODONE) 10 MG tablet, Take 1 tablet by mouth Every 6 (Six) Hours As Needed for Moderate Pain., Disp: 40 tablet, Rfl: 0  No current facility-administered medications for this visit.    Facility-Administered Medications Ordered in Other Visits:     acetaminophen (TYLENOL) tablet 650 mg, 650 mg, Oral, Once, Denisha Gill APRN    No Known Allergies    Family History   Problem Relation Age of Onset    Diabetes Mother     Diabetes Maternal Grandmother     Heart attack Maternal Grandmother     Stroke Maternal Grandmother        Cancer-related family history is not on file.    Social History     Tobacco Use    Smoking status: Never    Smokeless tobacco: Never   Vaping Use    Vaping Use: Never used   Substance Use Topics    Alcohol use: No    Drug use: No       I have reviewed and confirmed the accuracy of the patient's history: Chief complaint, HPI, ROS, and Subjective as entered by the MA/LPN/RN. Meghann Lerma MD 06/06/23      SUBJECTIVE:    Complains of significant amount of pain involving the back, she also complains of neuropathic symptoms in her fingers.  She has a pain pump but states this does not appear to be working.  She has been asked to follow-up with her pain management physician at [facility] but she is refusing to go.  She was also asked to go to the emergency room for acute pain management issues which are uncontrollable but she declines.  She has regular bowel movements and denies nausea or vomiting.  She does not have any bleeding issues at this time.      REVIEW OF SYSTEMS:    Review of Systems   Constitutional: Negative for chills and fever.   HENT: Negative for ear pain, mouth sores, nosebleeds and sore throat.    Eyes: Negative for photophobia and visual disturbance.   Respiratory: Negative for wheezing and stridor.    Cardiovascular: Negative for chest pain and palpitations.   Gastrointestinal: Negative for abdominal pain, diarrhea, nausea and vomiting.  "  Endocrine: Negative for cold intolerance and heat intolerance.   Genitourinary: Negative for dysuria and hematuria.   Musculoskeletal: Negative for joint swelling and neck stiffness.  Positive for back and leg pain.   Skin: Negative for color change and rash.   Neurological: Negative for seizures and syncope.   Hematological: Negative for adenopathy.        No obvious bleeding   Psychiatric/Behavioral: Negative for agitation, confusion and hallucinations. Positive for insomnia, anxiety, depression.      OBJECTIVE:    Vitals:    06/06/23 1105   BP: 123/81   Pulse: (!) 122   Temp: 98.1 °F (36.7 °C)   TempSrc: Oral   SpO2: 100%   Weight: 57.6 kg (127 lb)   Height: 162.6 cm (64\")   PainSc: 10-Worst pain ever   PainLoc: Leg  Comment: both legs     Body mass index is 21.8 kg/m².    ECOG  (1) Restricted in physically strenuous activity, ambulatory and able to do work of light nature    Physical Exam  Vitals and nursing note reviewed.   Constitutional:       General: She is not in acute distress.     Appearance: She is not diaphoretic.   HENT:      Head: Normocephalic and atraumatic.   Eyes:      General: No scleral icterus.        Right eye: No discharge.         Left eye: No discharge.      Conjunctiva/sclera: Conjunctivae normal.   Neck:      Thyroid: No thyromegaly.   Cardiovascular:      Rate and Rhythm: Normal rate and regular rhythm.      Heart sounds: Normal heart sounds. No friction rub. No gallop.    Pulmonary:      Effort: Pulmonary effort is normal. No respiratory distress.      Breath sounds: No stridor. No wheezing.   Abdominal:      General: Bowel sounds are normal.      Palpations: Abdomen is soft. There is no mass.      Tenderness: There is no abdominal tenderness. There is no guarding or rebound.   Musculoskeletal:         General: No tenderness. Normal range of motion.      Cervical back: Normal range of motion and neck supple.   Lymphadenopathy:      Cervical: No cervical adenopathy.   Skin:     " General: Skin is warm.      Findings: No erythema or rash.   Neurological:      Mental Status: She is alert and oriented to person, place, and time.      Motor: No abnormal muscle tone.   Psychiatric:         Behavior: Behavior anxious and tearful    I have reexamined the patient and the results are consistent with the previously documented exam. Meghann Lerma MD        RECENT LABS    WBC   Date Value Ref Range Status   06/05/2023 1.20 (C) 3.40 - 10.80 10*3/mm3 Final     RBC   Date Value Ref Range Status   06/05/2023 3.10 (L) 3.77 - 5.28 10*6/mm3 Final     Hemoglobin   Date Value Ref Range Status   06/05/2023 8.8 (L) 12.0 - 15.9 g/dL Final     Hematocrit   Date Value Ref Range Status   06/05/2023 26.7 (L) 34.0 - 46.6 % Final     MCV   Date Value Ref Range Status   06/05/2023 86.1 79.0 - 97.0 fL Final     MCH   Date Value Ref Range Status   06/05/2023 28.5 26.6 - 33.0 pg Final     MCHC   Date Value Ref Range Status   06/05/2023 33.1 31.5 - 35.7 g/dL Final     RDW   Date Value Ref Range Status   06/05/2023 17.2 (H) 12.3 - 15.4 % Final     RDW-SD   Date Value Ref Range Status   06/05/2023 51.6 37.0 - 54.0 fl Final     MPV   Date Value Ref Range Status   06/05/2023 7.2 6.0 - 12.0 fL Final     Platelets   Date Value Ref Range Status   06/05/2023 19 (C) 140 - 450 10*3/mm3 Final     Neutrophil %   Date Value Ref Range Status   06/02/2023 10.7 (L) 42.7 - 76.0 % Final     Lymphocyte %   Date Value Ref Range Status   06/02/2023 86.3 (H) 19.6 - 45.3 % Final     Monocyte %   Date Value Ref Range Status   06/02/2023 2.7 (L) 5.0 - 12.0 % Final     Eosinophil %   Date Value Ref Range Status   06/02/2023 0.0 (L) 0.3 - 6.2 % Final     Basophil %   Date Value Ref Range Status   06/02/2023 0.3 0.0 - 1.5 % Final     Neutrophils Absolute   Date Value Ref Range Status   06/05/2023 0.12 (L) 1.70 - 7.00 10*3/mm3 Final     External Neutrophils Abs   Date Value Ref Range Status   11/30/2022 7.6 (H) 1.7 - 6.0 x10(3)/ul Final      Neutrophils, Absolute   Date Value Ref Range Status   06/02/2023 0.10 (L) 1.70 - 7.00 10*3/mm3 Final     Lymphocytes, Absolute   Date Value Ref Range Status   06/02/2023 0.70 0.70 - 3.10 10*3/mm3 Final     Monocytes, Absolute   Date Value Ref Range Status   06/02/2023 0.00 (L) 0.10 - 0.90 10*3/mm3 Final     Eosinophils, Absolute   Date Value Ref Range Status   06/02/2023 0.00 0.00 - 0.40 10*3/mm3 Final     Basophils, Absolute   Date Value Ref Range Status   06/02/2023 0.00 0.00 - 0.20 10*3/mm3 Final     nRBC   Date Value Ref Range Status   06/02/2023 0.4 (H) 0.0 - 0.2 /100 WBC Final       Lab Results   Component Value Date    GLUCOSE 207 (H) 06/06/2023    BUN 28 (H) 06/06/2023    CREATININE 0.73 06/06/2023    EGFRIFNONA 133 02/02/2022    BCR 38.4 (H) 06/06/2023    K 5.0 06/06/2023    CO2 20.0 (L) 06/06/2023    CALCIUM 9.6 06/06/2023    ALBUMIN 3.9 06/06/2023    LABIL2 1.0 04/18/2019    AST 97 (H) 06/06/2023     (H) 06/06/2023           ASSESSMENT    Accelerated phase CML, status post bone marrow biopsy on 11/3/2022.  Patient was on ponatinib but developed significant pancytopenia and therefore treatment has been held.  We will continue to hold due to persistent thrombocytopenia today at 19,000.    Pain management issues, patient refusing to follow-up with her physician at [facility].  We will request hospice consult to help with pain management at home.  In the meantime we will start patient on oxycodone 10 mg every 6 hours as needed.  She received this dose while in the hospital and this seems to have controlled her pain.  BCR ABL kinase mutation assay was negative  Cardiomyopathy her most recent echo shows an EF of 44% which is an improvement from 26 to 30%.  She will continue to follow-up with her cardiologist  Anxiety: On Xanax  CML in chronic phase with no significant hematologic or molecular or cytogenetic response on   Imatinib.  ABL kinase mutational analysis was negative.   We  have represcribed to Tasigna 300 mg twice a day.  Patient has been noncompliant with treatments and ended up in the hospital with hyperleukocytosis, peripheral blood blasts.  Bone marrow biopsy suggest that she remains in chronic phase.  Continue to Tasiigna at the current doses.  Hydroxyurea has been discontinued.  Uncontrolled diabetes type 1, followed at the [facility] by Dr. Calles  Chronic anemia: Stable, multifactorial from CML, to Tasigna, hydroxyurea.  This has improved  Insomnia  Situational anxiety, not suicidal.  Continue Xanax 0.5 mg once daily.  Hyperleukocytosis secondary to CML  History of medical noncompliance  Pulmonary embolism: Xarelto restarted  Recent COVID-19 pneumonia  History of prolonged QTC        Plans    CBC reviewed  Would request for HLA matched platelet transfusion tomorrow   Consult  Continue 3 times a week CBCs  Continue to hold ponatinib  Codon 10 mg every 6 hours as needed for pain  Referral to supportive oncology due to worsening anxiety and depression, socioeconomic issues  Discontinue hydroxyurea due to normal platelets  Discontinued Gleevac  BCR ABL kinase mutational analysis was negative  Request additional records from [facility]  DC trazodone 25 mg due to QT prolongation  Monitor electrolytes closely  Continue to follow-up with cardiology in regards to dosing of her diuretics.  EKG reviewed.  She has slight prolongation of QT.  We will discontinue Zofran and Phenergan and use Compazine as needed for nausea.  Prescription has been sent to her pharmacy  GI consult for continued nausea and vomiting and reported dark emesis.  She had EGD and colonoscopy coming up but they are requesting $400 upfront which she does not have.  I have asked  to investigate if there is an affordable option.  Due to worsening anxiety increase Xanax to 0.5 mg p.o. nightly as needed number 30 pills.  Continue this for now.  Refill today.  All  questions answered  Continue to follow-up with PCP per routine  Follow-up with pain management per routine  Monitor EKG-normal QT interval from EKG in April 2023  Follow-up next week        I spent 40 total minutes, face-to-face, caring for Hope today. 90% of this time involved counseling and/or coordination of care as documented within this note.

## 2023-06-04 NOTE — PROGRESS NOTES
"Enter Query Response Below      Query Response: Moderate chronic disease related malnutrition              If applicable, please update the problem list.     Patient: Nieves Mc        : 1975  Account: [id number]           Admit Date: 2023        How to Respond to this query:       a. Click New Note     b. Answer query within the yellow box.                c. Update the Problem List, if applicable.      If you have any questions about this query contact me at: [email or web address]    Dr. Shields    47-year-old female with history of CML admitted 23 with pancytopenia. 23 malnutrition severity assessment by the registered dietician notes \"Moderate chronic disease related malnutrition related to decreased appetite in the setting of chronic myeloid leukemia as evidenced by PO intake meeting less than 75% of estimated energy requirement for greater than or equal to 3 months, > 10% wt loss in less than 6 months, and moderate muscle and fat wasting per NFPE. \". Recommendations: Addition of Boost Plus q daily, addition of Magic Cups at lunch/dinner      Please clarify if patient treated/monitored for the following:  Moderate chronic disease related malnutrition  Other- specify______  Unable to determine    By submitting this query, we are merely seeking further clarification of documentation to accurately reflect all conditions that you are monitoring, evaluating, treating or that extend the hospitalization or utilize additional resources of care. Please utilize your independent clinical judgment when addressing the question(s) above.     This query and your response, once completed, will be entered into the legal medical record.    Sincerely,  Traci Lopes MSN, RN, CCDS  Clinical Documentation Integrity Program   "

## 2023-06-05 ENCOUNTER — HOME CARE VISIT (OUTPATIENT)
Dept: HOME HEALTH SERVICES | Facility: HOME HEALTHCARE | Age: 48
End: 2023-06-05
Payer: COMMERCIAL

## 2023-06-05 ENCOUNTER — TELEPHONE (OUTPATIENT)
Dept: ONCOLOGY | Facility: CLINIC | Age: 48
End: 2023-06-05
Payer: MEDICARE

## 2023-06-05 ENCOUNTER — LAB REQUISITION (OUTPATIENT)
Dept: LAB | Facility: HOSPITAL | Age: 48
DRG: 809 | End: 2023-06-05
Payer: MEDICARE

## 2023-06-05 VITALS
TEMPERATURE: 98.2 F | OXYGEN SATURATION: 95 % | HEART RATE: 111 BPM | SYSTOLIC BLOOD PRESSURE: 120 MMHG | RESPIRATION RATE: 18 BRPM | DIASTOLIC BLOOD PRESSURE: 56 MMHG

## 2023-06-05 DIAGNOSIS — D69.6 THROMBOCYTOPENIA: Primary | ICD-10-CM

## 2023-06-05 DIAGNOSIS — S06.5X0D TRAUMATIC SUBDURAL HEMORRHAGE WITHOUT LOSS OF CONSCIOUSNESS, SUBSEQUENT ENCOUNTER: ICD-10-CM

## 2023-06-05 LAB
ANISOCYTOSIS BLD QL: ABNORMAL
DEPRECATED RDW RBC AUTO: 51.6 FL (ref 37–54)
ERYTHROCYTE [DISTWIDTH] IN BLOOD BY AUTOMATED COUNT: 17.2 % (ref 12.3–15.4)
HCT VFR BLD AUTO: 26.7 % (ref 34–46.6)
HGB BLD-MCNC: 8.8 G/DL (ref 12–15.9)
LYMPHOCYTES # BLD MANUAL: 1.08 10*3/MM3 (ref 0.7–3.1)
MCH RBC QN AUTO: 28.5 PG (ref 26.6–33)
MCHC RBC AUTO-ENTMCNC: 33.1 G/DL (ref 31.5–35.7)
MCV RBC AUTO: 86.1 FL (ref 79–97)
NEUTROPHILS # BLD AUTO: 0.12 10*3/MM3 (ref 1.7–7)
NEUTROPHILS NFR BLD MANUAL: 9 % (ref 42.7–76)
NEUTS BAND NFR BLD MANUAL: 1 % (ref 0–5)
PLATELET # BLD AUTO: 19 10*3/MM3 (ref 140–450)
PMV BLD AUTO: 7.2 FL (ref 6–12)
RBC # BLD AUTO: 3.1 10*6/MM3 (ref 3.77–5.28)
SCAN SLIDE: NORMAL
SMALL PLATELETS BLD QL SMEAR: ABNORMAL
SMUDGE CELLS BLD QL SMEAR: ABNORMAL
VARIANT LYMPHS NFR BLD MANUAL: 90 % (ref 19.6–45.3)
WBC NRBC COR # BLD: 1.2 10*3/MM3 (ref 3.4–10.8)

## 2023-06-05 PROCEDURE — 85025 COMPLETE CBC W/AUTO DIFF WBC: CPT | Performed by: INTERNAL MEDICINE

## 2023-06-05 PROCEDURE — G0299 HHS/HOSPICE OF RN EA 15 MIN: HCPCS

## 2023-06-05 NOTE — TELEPHONE ENCOUNTER
Received a call from the pt's mother stating that the pt called her today reporting low blood counts. Labs were drawn Friday. I told her that we would have to get new labs before any recommendation could be made. She stated that home health would be coming out today and they could draw the labs.     Called pt to assess for symptoms. Pt stated that she is in pain, but no further symptoms. I told her that I noticed her home health appt was not until 1300 and asked if there was any way she could have her labs checked prior to that, either here or at another facility. She stated that she would call someone to see if they could drive her and that she would call me back.     Never received a return call. Received lab results from home health which were slightly improved from Friday. I called the pt's mother to let her know that her labs have slightly improved, but Dr. Lerma will likely still order platelets. I told her I would discuss it with Dr. Lerma in the morning and call her with an appt time. She verbalized understanding.

## 2023-06-05 NOTE — HOME HEALTH
Routine Visit Note:    Skill/education provided: cardiopulmonary assessment, med education, pain assessment, labs per md order     Patient/caregiver response: pt stated understanding    Plan for next visit: cardiopulmonary assessment, med education, pain assessment, labs per md order     Other pertinent info: monitor for lab results

## 2023-06-06 ENCOUNTER — HOME CARE VISIT (OUTPATIENT)
Dept: HOME HEALTH SERVICES | Facility: HOME HEALTHCARE | Age: 48
End: 2023-06-06
Payer: COMMERCIAL

## 2023-06-06 ENCOUNTER — LAB (OUTPATIENT)
Dept: LAB | Facility: HOSPITAL | Age: 48
End: 2023-06-06
Payer: MEDICARE

## 2023-06-06 ENCOUNTER — OFFICE VISIT (OUTPATIENT)
Dept: ONCOLOGY | Facility: CLINIC | Age: 48
End: 2023-06-06
Payer: MEDICARE

## 2023-06-06 VITALS
HEART RATE: 122 BPM | BODY MASS INDEX: 21.68 KG/M2 | DIASTOLIC BLOOD PRESSURE: 81 MMHG | SYSTOLIC BLOOD PRESSURE: 123 MMHG | HEIGHT: 64 IN | OXYGEN SATURATION: 100 % | TEMPERATURE: 98.1 F | WEIGHT: 127 LBS

## 2023-06-06 DIAGNOSIS — C92.10 CML (CHRONIC MYELOCYTIC LEUKEMIA): Primary | ICD-10-CM

## 2023-06-06 DIAGNOSIS — I50.9 CONGESTIVE HEART FAILURE, UNSPECIFIED HF CHRONICITY, UNSPECIFIED HEART FAILURE TYPE: ICD-10-CM

## 2023-06-06 DIAGNOSIS — C92.10 CML (CHRONIC MYELOCYTIC LEUKEMIA): ICD-10-CM

## 2023-06-06 DIAGNOSIS — D69.6 THROMBOCYTOPENIA: ICD-10-CM

## 2023-06-06 DIAGNOSIS — R52 PAIN: ICD-10-CM

## 2023-06-06 DIAGNOSIS — D69.6 THROMBOCYTOPENIA: Primary | ICD-10-CM

## 2023-06-06 DIAGNOSIS — C95.90 LEUKEMIA NOT HAVING ACHIEVED REMISSION, UNSPECIFIED LEUKEMIA TYPE: ICD-10-CM

## 2023-06-06 DIAGNOSIS — G89.3 CANCER RELATED PAIN: ICD-10-CM

## 2023-06-06 LAB
ALBUMIN SERPL-MCNC: 3.9 G/DL (ref 3.5–5.2)
ALBUMIN/GLOB SERPL: 1.2 G/DL
ALP SERPL-CCNC: 793 U/L (ref 39–117)
ALT SERPL W P-5'-P-CCNC: 103 U/L (ref 1–33)
ANION GAP SERPL CALCULATED.3IONS-SCNC: 17 MMOL/L (ref 5–15)
AST SERPL-CCNC: 97 U/L (ref 1–32)
BILIRUB SERPL-MCNC: 1.2 MG/DL (ref 0–1.2)
BUN SERPL-MCNC: 28 MG/DL (ref 6–20)
BUN/CREAT SERPL: 38.4 (ref 7–25)
CALCIUM SPEC-SCNC: 9.6 MG/DL (ref 8.6–10.5)
CHLORIDE SERPL-SCNC: 103 MMOL/L (ref 98–107)
CO2 SERPL-SCNC: 20 MMOL/L (ref 22–29)
CREAT SERPL-MCNC: 0.73 MG/DL (ref 0.57–1)
EGFRCR SERPLBLD CKD-EPI 2021: 102.2 ML/MIN/1.73
GLOBULIN UR ELPH-MCNC: 3.3 GM/DL
GLUCOSE SERPL-MCNC: 207 MG/DL (ref 65–99)
POTASSIUM SERPL-SCNC: 5 MMOL/L (ref 3.5–5.2)
PROT SERPL-MCNC: 7.2 G/DL (ref 6–8.5)
SODIUM SERPL-SCNC: 140 MMOL/L (ref 136–145)

## 2023-06-06 PROCEDURE — 36415 COLL VENOUS BLD VENIPUNCTURE: CPT

## 2023-06-06 PROCEDURE — 80053 COMPREHEN METABOLIC PANEL: CPT | Performed by: INTERNAL MEDICINE

## 2023-06-06 RX ORDER — SODIUM CHLORIDE 9 MG/ML
250 INJECTION, SOLUTION INTRAVENOUS AS NEEDED
OUTPATIENT
Start: 2023-06-06

## 2023-06-06 RX ORDER — ACETAMINOPHEN 325 MG/1
650 TABLET ORAL ONCE
OUTPATIENT
Start: 2023-06-06 | End: 2023-06-06

## 2023-06-06 RX ORDER — DIPHENHYDRAMINE HCL 25 MG
25 CAPSULE ORAL ONCE
OUTPATIENT
Start: 2023-06-06 | End: 2023-06-06

## 2023-06-06 RX ORDER — OXYCODONE HYDROCHLORIDE 10 MG/1
10 TABLET ORAL EVERY 6 HOURS PRN
Qty: 40 TABLET | Refills: 0 | Status: SHIPPED | OUTPATIENT
Start: 2023-06-06

## 2023-06-07 ENCOUNTER — READMISSION MANAGEMENT (OUTPATIENT)
Dept: CALL CENTER | Facility: HOSPITAL | Age: 48
End: 2023-06-07
Payer: MEDICARE

## 2023-06-07 ENCOUNTER — HOSPITAL ENCOUNTER (INPATIENT)
Facility: HOSPITAL | Age: 48
LOS: 2 days | Discharge: HOME | DRG: 809 | End: 2023-06-10
Attending: EMERGENCY MEDICINE | Admitting: STUDENT IN AN ORGANIZED HEALTH CARE EDUCATION/TRAINING PROGRAM
Payer: MEDICARE

## 2023-06-07 ENCOUNTER — HOME CARE VISIT (OUTPATIENT)
Dept: HOME HEALTH SERVICES | Facility: HOME HEALTHCARE | Age: 48
End: 2023-06-07
Payer: COMMERCIAL

## 2023-06-07 ENCOUNTER — LAB REQUISITION (OUTPATIENT)
Dept: LAB | Facility: HOSPITAL | Age: 48
DRG: 809 | End: 2023-06-07
Payer: MEDICARE

## 2023-06-07 ENCOUNTER — TELEPHONE (OUTPATIENT)
Dept: ONCOLOGY | Facility: CLINIC | Age: 48
End: 2023-06-07
Payer: MEDICARE

## 2023-06-07 VITALS
SYSTOLIC BLOOD PRESSURE: 132 MMHG | WEIGHT: 127 LBS | BODY MASS INDEX: 21.8 KG/M2 | DIASTOLIC BLOOD PRESSURE: 62 MMHG | HEART RATE: 118 BPM | OXYGEN SATURATION: 97 %

## 2023-06-07 DIAGNOSIS — D61.818 PANCYTOPENIA: Primary | ICD-10-CM

## 2023-06-07 DIAGNOSIS — S06.5X0D TRAUMATIC SUBDURAL HEMORRHAGE WITHOUT LOSS OF CONSCIOUSNESS, SUBSEQUENT ENCOUNTER: ICD-10-CM

## 2023-06-07 DIAGNOSIS — C92.10 CHRONIC MYELOGENOUS LEUKEMIA: ICD-10-CM

## 2023-06-07 DIAGNOSIS — D69.6 THROMBOCYTOPENIA: ICD-10-CM

## 2023-06-07 DIAGNOSIS — C92.10 CML (CHRONIC MYELOCYTIC LEUKEMIA): ICD-10-CM

## 2023-06-07 LAB
ABO GROUP BLD: NORMAL
ALBUMIN SERPL-MCNC: 3.9 G/DL (ref 3.5–5.2)
ALBUMIN/GLOB SERPL: 1 G/DL
ALP SERPL-CCNC: 687 U/L (ref 39–117)
ALT SERPL W P-5'-P-CCNC: 70 U/L (ref 1–33)
ANION GAP SERPL CALCULATED.3IONS-SCNC: 13 MMOL/L (ref 5–15)
ANISOCYTOSIS BLD QL: ABNORMAL
AST SERPL-CCNC: 29 U/L (ref 1–32)
BASOPHILS # BLD AUTO: 0 10*3/MM3 (ref 0–0.2)
BASOPHILS NFR BLD AUTO: 0.4 % (ref 0–1.5)
BILIRUB SERPL-MCNC: 2.7 MG/DL (ref 0–1.2)
BLD GP AB SCN SERPL QL: NEGATIVE
BUN SERPL-MCNC: 22 MG/DL (ref 6–20)
BUN/CREAT SERPL: 40.7 (ref 7–25)
CALCIUM SPEC-SCNC: 9.8 MG/DL (ref 8.6–10.5)
CHLORIDE SERPL-SCNC: 99 MMOL/L (ref 98–107)
CO2 SERPL-SCNC: 24 MMOL/L (ref 22–29)
CREAT SERPL-MCNC: 0.54 MG/DL (ref 0.57–1)
D-LACTATE SERPL-SCNC: 0.6 MMOL/L (ref 0.3–2)
DEPRECATED RDW RBC AUTO: 49 FL (ref 37–54)
DEPRECATED RDW RBC AUTO: 49.4 FL (ref 37–54)
EGFRCR SERPLBLD CKD-EPI 2021: 114.4 ML/MIN/1.73
EOSINOPHIL # BLD AUTO: 0 10*3/MM3 (ref 0–0.4)
EOSINOPHIL NFR BLD AUTO: 0.1 % (ref 0.3–6.2)
ERYTHROCYTE [DISTWIDTH] IN BLOOD BY AUTOMATED COUNT: 16.5 % (ref 12.3–15.4)
ERYTHROCYTE [DISTWIDTH] IN BLOOD BY AUTOMATED COUNT: 16.7 % (ref 12.3–15.4)
GLOBULIN UR ELPH-MCNC: 4 GM/DL
GLUCOSE SERPL-MCNC: 220 MG/DL (ref 65–99)
HCT VFR BLD AUTO: 18.5 % (ref 34–46.6)
HCT VFR BLD AUTO: 20.2 % (ref 34–46.6)
HGB BLD-MCNC: 6.1 G/DL (ref 12–15.9)
HGB BLD-MCNC: 6.6 G/DL (ref 12–15.9)
HOLD SPECIMEN: NORMAL
LYMPHOCYTES # BLD AUTO: 0.5 10*3/MM3 (ref 0.7–3.1)
LYMPHOCYTES # BLD MANUAL: 0.83 10*3/MM3 (ref 0.7–3.1)
LYMPHOCYTES NFR BLD AUTO: 48.4 % (ref 19.6–45.3)
MCH RBC QN AUTO: 27.4 PG (ref 26.6–33)
MCH RBC QN AUTO: 28.1 PG (ref 26.6–33)
MCHC RBC AUTO-ENTMCNC: 32.8 G/DL (ref 31.5–35.7)
MCHC RBC AUTO-ENTMCNC: 32.9 G/DL (ref 31.5–35.7)
MCV RBC AUTO: 83.5 FL (ref 79–97)
MCV RBC AUTO: 85.3 FL (ref 79–97)
MICROCYTES BLD QL: ABNORMAL
MONOCYTES # BLD AUTO: 0 10*3/MM3 (ref 0.1–0.9)
MONOCYTES NFR BLD AUTO: 3.9 % (ref 5–12)
NEUTROPHILS # BLD AUTO: 0.37 10*3/MM3 (ref 1.7–7)
NEUTROPHILS NFR BLD AUTO: 0.5 10*3/MM3 (ref 1.7–7)
NEUTROPHILS NFR BLD AUTO: 47.2 % (ref 42.7–76)
NEUTROPHILS NFR BLD MANUAL: 27 % (ref 42.7–76)
NEUTS BAND NFR BLD MANUAL: 4 % (ref 0–5)
NRBC BLD AUTO-RTO: 0.7 /100 WBC (ref 0–0.2)
PLATELET # BLD AUTO: 22 10*3/MM3 (ref 140–450)
PLATELET # BLD AUTO: 23 10*3/MM3 (ref 140–450)
PMV BLD AUTO: 7.1 FL (ref 6–12)
PMV BLD AUTO: 7.5 FL (ref 6–12)
POTASSIUM SERPL-SCNC: 4.4 MMOL/L (ref 3.5–5.2)
PROT SERPL-MCNC: 7.9 G/DL (ref 6–8.5)
RBC # BLD AUTO: 2.22 10*6/MM3 (ref 3.77–5.28)
RBC # BLD AUTO: 2.37 10*6/MM3 (ref 3.77–5.28)
RH BLD: POSITIVE
SCAN SLIDE: NORMAL
SMALL PLATELETS BLD QL SMEAR: ABNORMAL
SODIUM SERPL-SCNC: 136 MMOL/L (ref 136–145)
T&S EXPIRATION DATE: NORMAL
VARIANT LYMPHS NFR BLD MANUAL: 69 % (ref 19.6–45.3)
WBC MORPH BLD: NORMAL
WBC NRBC COR # BLD: 1.1 10*3/MM3 (ref 3.4–10.8)
WBC NRBC COR # BLD: 1.2 10*3/MM3 (ref 3.4–10.8)
WHOLE BLOOD HOLD COAG: NORMAL
WHOLE BLOOD HOLD SPECIMEN: NORMAL

## 2023-06-07 PROCEDURE — 86922 COMPATIBILITY TEST ANTIGLOB: CPT

## 2023-06-07 PROCEDURE — P9040 RBC LEUKOREDUCED IRRADIATED: HCPCS

## 2023-06-07 PROCEDURE — 87040 BLOOD CULTURE FOR BACTERIA: CPT | Performed by: EMERGENCY MEDICINE

## 2023-06-07 PROCEDURE — 86900 BLOOD TYPING SEROLOGIC ABO: CPT | Performed by: EMERGENCY MEDICINE

## 2023-06-07 PROCEDURE — 83605 ASSAY OF LACTIC ACID: CPT

## 2023-06-07 PROCEDURE — 25010000002 HYDROMORPHONE 1 MG/ML SOLUTION: Performed by: EMERGENCY MEDICINE

## 2023-06-07 PROCEDURE — G0378 HOSPITAL OBSERVATION PER HR: HCPCS

## 2023-06-07 PROCEDURE — 36430 TRANSFUSION BLD/BLD COMPNT: CPT

## 2023-06-07 PROCEDURE — 25010000002 ONDANSETRON PER 1 MG: Performed by: EMERGENCY MEDICINE

## 2023-06-07 PROCEDURE — 25010000002 DEXAMETHASONE PER 1 MG: Performed by: EMERGENCY MEDICINE

## 2023-06-07 PROCEDURE — 86900 BLOOD TYPING SEROLOGIC ABO: CPT

## 2023-06-07 PROCEDURE — 85025 COMPLETE CBC W/AUTO DIFF WBC: CPT | Performed by: INTERNAL MEDICINE

## 2023-06-07 PROCEDURE — 25010000002 CEFTAZIDIME 2 G RECONSTITUTED SOLUTION 1 EACH VIAL: Performed by: EMERGENCY MEDICINE

## 2023-06-07 PROCEDURE — 85025 COMPLETE CBC W/AUTO DIFF WBC: CPT | Performed by: EMERGENCY MEDICINE

## 2023-06-07 PROCEDURE — 86850 RBC ANTIBODY SCREEN: CPT | Performed by: EMERGENCY MEDICINE

## 2023-06-07 PROCEDURE — 80053 COMPREHEN METABOLIC PANEL: CPT | Performed by: EMERGENCY MEDICINE

## 2023-06-07 PROCEDURE — 36415 COLL VENOUS BLD VENIPUNCTURE: CPT | Performed by: EMERGENCY MEDICINE

## 2023-06-07 PROCEDURE — 99285 EMERGENCY DEPT VISIT HI MDM: CPT

## 2023-06-07 PROCEDURE — G0299 HHS/HOSPICE OF RN EA 15 MIN: HCPCS

## 2023-06-07 PROCEDURE — 86901 BLOOD TYPING SEROLOGIC RH(D): CPT | Performed by: EMERGENCY MEDICINE

## 2023-06-07 RX ORDER — HYDROXYZINE HYDROCHLORIDE 25 MG/1
25 TABLET, FILM COATED ORAL EVERY 8 HOURS PRN
Status: DISCONTINUED | OUTPATIENT
Start: 2023-06-07 | End: 2023-06-10 | Stop reason: HOSPADM

## 2023-06-07 RX ORDER — DEXAMETHASONE SODIUM PHOSPHATE 4 MG/ML
4 INJECTION, SOLUTION INTRA-ARTICULAR; INTRALESIONAL; INTRAMUSCULAR; INTRAVENOUS; SOFT TISSUE ONCE
Status: COMPLETED | OUTPATIENT
Start: 2023-06-07 | End: 2023-06-07

## 2023-06-07 RX ORDER — BUDESONIDE AND FORMOTEROL FUMARATE DIHYDRATE 160; 4.5 UG/1; UG/1
2 AEROSOL RESPIRATORY (INHALATION)
Status: DISCONTINUED | OUTPATIENT
Start: 2023-06-07 | End: 2023-06-10 | Stop reason: HOSPADM

## 2023-06-07 RX ORDER — ONDANSETRON 2 MG/ML
4 INJECTION INTRAMUSCULAR; INTRAVENOUS ONCE
Status: COMPLETED | OUTPATIENT
Start: 2023-06-07 | End: 2023-06-07

## 2023-06-07 RX ORDER — ACETAMINOPHEN 500 MG
1000 TABLET ORAL ONCE
Status: COMPLETED | OUTPATIENT
Start: 2023-06-07 | End: 2023-06-07

## 2023-06-07 RX ORDER — CITALOPRAM 20 MG/1
10 TABLET ORAL DAILY
Status: DISCONTINUED | OUTPATIENT
Start: 2023-06-08 | End: 2023-06-10 | Stop reason: HOSPADM

## 2023-06-07 RX ORDER — OXYCODONE HYDROCHLORIDE 5 MG/1
10 TABLET ORAL EVERY 6 HOURS PRN
Status: DISCONTINUED | OUTPATIENT
Start: 2023-06-07 | End: 2023-06-09

## 2023-06-07 RX ORDER — ALPRAZOLAM 0.5 MG/1
0.5 TABLET ORAL NIGHTLY PRN
Status: DISCONTINUED | OUTPATIENT
Start: 2023-06-07 | End: 2023-06-10 | Stop reason: HOSPADM

## 2023-06-07 RX ADMIN — ONDANSETRON 4 MG: 2 INJECTION INTRAMUSCULAR; INTRAVENOUS at 20:53

## 2023-06-07 RX ADMIN — CEFTAZIDIME 2 G: 2 INJECTION, POWDER, FOR SOLUTION INTRAVENOUS at 21:43

## 2023-06-07 RX ADMIN — DEXAMETHASONE SODIUM PHOSPHATE 4 MG: 4 INJECTION, SOLUTION INTRAMUSCULAR; INTRAVENOUS at 20:53

## 2023-06-07 RX ADMIN — HYDROMORPHONE HYDROCHLORIDE 0.5 MG: 1 INJECTION, SOLUTION INTRAMUSCULAR; INTRAVENOUS; SUBCUTANEOUS at 23:01

## 2023-06-07 RX ADMIN — SODIUM CHLORIDE 1728 ML: 9 INJECTION, SOLUTION INTRAVENOUS at 20:46

## 2023-06-07 RX ADMIN — ONDANSETRON 4 MG: 2 INJECTION INTRAMUSCULAR; INTRAVENOUS at 23:01

## 2023-06-07 RX ADMIN — HYDROMORPHONE HYDROCHLORIDE 0.5 MG: 1 INJECTION, SOLUTION INTRAMUSCULAR; INTRAVENOUS; SUBCUTANEOUS at 20:53

## 2023-06-07 RX ADMIN — ACETAMINOPHEN 1000 MG: 500 TABLET, FILM COATED ORAL at 20:53

## 2023-06-07 NOTE — TELEPHONE ENCOUNTER
Attempted to call pt to let her know that Dr. Lerma is recommending that she go to the ER for a low hgb. No answer, VM full. Attempted to call pt's mother as well. Message left on VM. INNOBI message sent as well.

## 2023-06-07 NOTE — ED PROVIDER NOTES
"Subjective   History of Present Illness  47-year-old female presents complaining of \"bone pain\".  The patient also states she has been extremely weak and has severe dyspnea on exertion.  She states that she recently had a cough but has not been coughing much recently, it was nonproductive.  The patient states she felt hot and was chilling earlier in the day.  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  Patient states she has required transfusion in the past and apparently was originally scheduled to have transfusion tomorrow but did not think she could wait.  Earlier hemoglobin is reportedly 6.6  Patient also reports that she does not think her pain pump that supplies Dilaudid is working accurately.  She states she is followed at the [facility] in [place] for this problem.  She takes oxycodone for breakthrough pain but states that it is not controlling her level of discomfort  Review of Systems   Constitutional:  Positive for chills, fatigue and fever.   HENT:  Negative for sore throat.    Eyes:  Negative for discharge.   Respiratory:  Positive for shortness of breath.    Gastrointestinal:  Positive for nausea. Negative for anal bleeding.   Musculoskeletal:  Positive for arthralgias, back pain and myalgias. Negative for neck pain and neck stiffness.   Skin:  Negative for rash.   Neurological:  Negative for headaches.   Psychiatric/Behavioral:  Dysphoric mood: .[id number].      Past Medical History:   Diagnosis Date    Bone pain     COVID-19 virus infection 01/12/2022    Diabetes mellitus     Extremity pain     Carlin. legs pain    Leg pain     left leg greater    Migraine     Pulmonary embolism     Vision loss     doing surgery     Apparently compliance has been an issue in the past  No Known Allergies    Past Surgical History:   Procedure Laterality Date    BONE MARROW BIOPSY      BREAST SURGERY      BRONCHOSCOPY N/A 6/6/2022    Procedure: BRONCHOSCOPY bilateral lung washing;  Surgeon: Draw, " MD Charlene;  Location: [facility] ENDOSCOPY;  Service: Pulmonary;  Laterality: N/A;  post: rule out infection vs transfusion lung injury     SECTION      CHOLECYSTECTOMY      EYE SURGERY      laser surgery due  to hemmorage--- 2021-- another surgery  lt eye11/15/21    RETINAL DETACHMENT SURGERY      SPINE SURGERY      Lombardi spinal block    TUBAL ABDOMINAL LIGATION         Family History   Problem Relation Age of Onset    Diabetes Mother     Diabetes Maternal Grandmother     Heart attack Maternal Grandmother     Stroke Maternal Grandmother        Social History     Socioeconomic History    Marital status: Legally    Tobacco Use    Smoking status: Never    Smokeless tobacco: Never   Vaping Use    Vaping Use: Never used   Substance and Sexual Activity    Alcohol use: No    Drug use: No    Sexual activity: Defer       Reports no unusual food water travel or activity    Objective   Physical Exam  Alert Helder Coma Scale 15 appears frail   HEENT: Pupils equal and reactive to light. Conjunctivae are not injected. Normal tympanic membranes. Oropharynx and nares are normal.   Neck: Supple. Midline trachea. No JVD. No goiter.   Chest: A few rare rhonchi are noted bilaterally and equal breath sounds bilaterally, regular rate and rhythm without murmur or rub.   Abdomen: Positive bowel sounds, nontender, nondistended. No rebound or peritoneal signs. No CVA tenderness.  No palpable organomegaly  Extremities no clubbing. cyanosis or edema. Motor sensory exam is normal. The full range of motion is intact   Skin: Warm and dry, no rashes or petechia.   Lymphatic: No regional lymphadenopathy. No calf pain, swelling or Homans sign    Procedures           ED Course      Labs Reviewed   COMPREHENSIVE METABOLIC PANEL - Abnormal; Notable for the following components:       Result Value    Glucose 220 (*)     BUN 22 (*)     Creatinine 0.54 (*)     ALT (SGPT) 70 (*)     Alkaline Phosphatase 687 (*)     Total Bilirubin 2.7  (*)     BUN/Creatinine Ratio 40.7 (*)     All other components within normal limits    Narrative:     GFR Normal >60  Chronic Kidney Disease <60  Kidney Failure <15     CBC WITH AUTO DIFFERENTIAL - Abnormal; Notable for the following components:    WBC 1.10 (*)     RBC 2.22 (*)     Hemoglobin 6.1 (*)     Hematocrit 18.5 (*)     RDW 16.7 (*)     Platelets 23 (*)     Lymphocyte % 48.4 (*)     Monocyte % 3.9 (*)     Eosinophil % 0.1 (*)     Neutrophils, Absolute 0.50 (*)     Lymphocytes, Absolute 0.50 (*)     Monocytes, Absolute 0.00 (*)     nRBC 0.7 (*)     All other components within normal limits   POC LACTATE - Normal   BLOOD CULTURE   BLOOD CULTURE   RAINBOW DRAW    Narrative:     The following orders were created for panel order [place] Draw.  Procedure                               Abnormality         Status                     ---------                               -----------         ------                     Green Top (Gel)[[id number]]                                  Final result               Lavender Top[[id number]]                                     Final result               Gold Top - SST[[id number]]                                   Final result               Light Blue Top[[id number]]                                   Final result                 Please view results for these tests on the individual orders.   URINALYSIS W/ CULTURE IF INDICATED   POC LACTATE   TYPE AND SCREEN   PREPARE RBC   BB ARMBAND CHECK   GREEN TOP   LAVENDER TOP   GOLD TOP - SST   LIGHT BLUE TOP   CBC AND DIFFERENTIAL    Narrative:     The following orders were created for panel order CBC & Differential.  Procedure                               Abnormality         Status                     ---------                               -----------         ------                     CBC Auto Differential[[id number]]        Abnormal            Final result               Scan Slide[[id number]]                                                                     Please view results for these tests on the individual orders.     Medications   cefTAZidime (FORTAZ) 2 g in sodium chloride 0.9 % 100 mL IVPB (0 g Intravenous Stopped 6/7/23 2232)   ondansetron (ZOFRAN) injection 4 mg (has no administration in time range)   HYDROmorphone (DILAUDID) injection 0.5 mg (has no administration in time range)   sodium chloride 0.9 % bolus 1,728 mL (0 mL Intravenous Stopped 6/7/23 2153)   dexamethasone (DECADRON) injection 4 mg (4 mg Intravenous Given 6/7/23 2053)   acetaminophen (TYLENOL) tablet 1,000 mg (1,000 mg Oral Given 6/7/23 2053)   ondansetron (ZOFRAN) injection 4 mg (4 mg Intravenous Given 6/7/23 2053)   HYDROmorphone (DILAUDID) injection 0.5 mg (0.5 mg Intravenous Given 6/7/23 2053)     No radiology results for the last day                                       Medical Decision Making  The patient was hydrated in the emergency department and cultures were obtained the patient was given Fortaz.  The hemoglobin was reviewed platelet count was 22,000 the patient's ANC was    Amount and/or Complexity of Data Reviewed  Independent Historian:      Details: Family  External Data Reviewed: labs and notes.  Labs: ordered. Decision-making details documented in ED Course.    Risk  OTC drugs.  Prescription drug management.  Parenteral controlled substances.  Decision regarding hospitalization.  Risk Details: Risk of progression to neutropenic sepsis, risk of progression of CML disease, risk of spontaneous hemorrhage particularly        Final diagnoses:   Pancytopenia   Chronic myelogenous leukemia       ED Disposition  ED Disposition       ED Disposition   Decision to Admit    Condition   --    Comment   Level of Care: Telemetry [5]   Diagnosis: Pancytopenia [[id number]]   Admitting Physician: KHAI JAMES [[id number]]   Attending Physician: KHAI JAMES [[id number]]   Bed Request Comments: Reverse isolation                 No follow-up provider specified.       Medication List       No changes were made to your prescriptions during this visit.            Rao Ballesteros MD  06/07/23 1015

## 2023-06-07 NOTE — Clinical Note
Level of Care: Telemetry [5]   Diagnosis: Pancytopenia [[id number]]   Admitting Physician: KHAI JAMES [[id number]]   Attending Physician: KAHI JAMES [[id number]]   Bed Request Comments: Reverse isolation

## 2023-06-07 NOTE — HOME HEALTH
Routine Visit Note:    Skill/education provided: cardiopulmonary assessment, med education, pain assessment, fall prevention education      Patient/caregiver response: pt stated understanding    Plan for next visit: cardiopulmonary assessment, med education, pain assessment, fall prevention education      Other pertinent info: monitor for bleeding

## 2023-06-07 NOTE — ED NOTES
Attempted to start IV in PIT. Pt refused to allow completion of IV after initial incretion of angio pt withdrew arm and would not allow for further advancement of angio to complete IV incretion. PT educated about need for IV to collect blood and that further attempts will be made once placed in room if not allowed to complete IV in the PIT.

## 2023-06-07 NOTE — ED NOTES
"This nurse went into patient's room to introduce self, hook up to the monitors, and discuss the need for blood work ordered. Pt states she was supposed to be a direct admit from her MD office. Pt states her WBC are low and she \"doesn't have blood to keep giving to us\". Pt also states she has an appt in the morning for a blood transfusion. This nurse tried to educate the need to run blood work and hook up to monitors. Pt states she doesn't want to be hooked up to monitors and demands this nurse to get the MD now.  "

## 2023-06-07 NOTE — Clinical Note
Patient reported that she is having bleeding when she is brushing her teeth. Patient has increased petechia in her lower legs and arms. Patient is also having increased pain that is not improving with oral medication.

## 2023-06-08 LAB
ANION GAP SERPL CALCULATED.3IONS-SCNC: 13 MMOL/L (ref 5–15)
BACTERIA UR QL AUTO: ABNORMAL /HPF
BH BB BLOOD EXPIRATION DATE: NORMAL
BH BB BLOOD TYPE BARCODE: 600
BH BB DISPENSE STATUS: NORMAL
BH BB PRODUCT CODE: NORMAL
BH BB UNIT NUMBER: NORMAL
BILIRUB UR QL STRIP: NEGATIVE
BUN SERPL-MCNC: 20 MG/DL (ref 6–20)
BUN/CREAT SERPL: 38.5 (ref 7–25)
CALCIUM SPEC-SCNC: 9.6 MG/DL (ref 8.6–10.5)
CHLORIDE SERPL-SCNC: 101 MMOL/L (ref 98–107)
CLARITY UR: ABNORMAL
CO2 SERPL-SCNC: 21 MMOL/L (ref 22–29)
COLOR UR: ABNORMAL
CREAT SERPL-MCNC: 0.52 MG/DL (ref 0.57–1)
CROSSMATCH INTERPRETATION: NORMAL
DEPRECATED RDW RBC AUTO: 49 FL (ref 37–54)
EGFRCR SERPLBLD CKD-EPI 2021: 115.5 ML/MIN/1.73
ERYTHROCYTE [DISTWIDTH] IN BLOOD BY AUTOMATED COUNT: 16.4 % (ref 12.3–15.4)
GLUCOSE BLDC GLUCOMTR-MCNC: 116 MG/DL (ref 70–105)
GLUCOSE BLDC GLUCOMTR-MCNC: 138 MG/DL (ref 70–105)
GLUCOSE BLDC GLUCOMTR-MCNC: 147 MG/DL (ref 70–105)
GLUCOSE BLDC GLUCOMTR-MCNC: 172 MG/DL (ref 70–105)
GLUCOSE SERPL-MCNC: 231 MG/DL (ref 65–99)
GLUCOSE UR STRIP-MCNC: ABNORMAL MG/DL
HCT VFR BLD AUTO: 21.6 % (ref 34–46.6)
HGB BLD-MCNC: 7.1 G/DL (ref 12–15.9)
HGB UR QL STRIP.AUTO: NEGATIVE
HYALINE CASTS UR QL AUTO: ABNORMAL /LPF
KETONES UR QL STRIP: ABNORMAL
LEUKOCYTE ESTERASE UR QL STRIP.AUTO: NEGATIVE
LYMPHOCYTES # BLD MANUAL: 0.77 10*3/MM3 (ref 0.7–3.1)
LYMPHOCYTES NFR BLD MANUAL: 3 % (ref 5–12)
MCH RBC QN AUTO: 28 PG (ref 26.6–33)
MCHC RBC AUTO-ENTMCNC: 33 G/DL (ref 31.5–35.7)
MCV RBC AUTO: 84.7 FL (ref 79–97)
METAMYELOCYTES NFR BLD MANUAL: 1 % (ref 0–0)
MONOCYTES # BLD: 0.04 10*3/MM3 (ref 0.1–0.9)
MYELOCYTES NFR BLD MANUAL: 1 % (ref 0–0)
NEUTROPHILS # BLD AUTO: 0.47 10*3/MM3 (ref 1.7–7)
NEUTROPHILS NFR BLD MANUAL: 31 % (ref 42.7–76)
NEUTS BAND NFR BLD MANUAL: 5 % (ref 0–5)
NITRITE UR QL STRIP: NEGATIVE
PH UR STRIP.AUTO: 6 [PH] (ref 5–8)
PLATELET # BLD AUTO: 27 10*3/MM3 (ref 140–450)
PMV BLD AUTO: 7.3 FL (ref 6–12)
POTASSIUM SERPL-SCNC: 4.6 MMOL/L (ref 3.5–5.2)
PROT UR QL STRIP: ABNORMAL
RBC # BLD AUTO: 2.55 10*6/MM3 (ref 3.77–5.28)
RBC # UR STRIP: ABNORMAL /HPF
RBC MORPH BLD: NORMAL
REF LAB TEST METHOD: ABNORMAL
SCAN SLIDE: NORMAL
SMALL PLATELETS BLD QL SMEAR: ABNORMAL
SODIUM SERPL-SCNC: 135 MMOL/L (ref 136–145)
SP GR UR STRIP: 1.02 (ref 1–1.03)
SQUAMOUS #/AREA URNS HPF: ABNORMAL /HPF
UNIT  ABO: NORMAL
UNIT  RH: NORMAL
UROBILINOGEN UR QL STRIP: ABNORMAL
VARIANT LYMPHS NFR BLD MANUAL: 59 % (ref 19.6–45.3)
WBC # UR STRIP: ABNORMAL /HPF
WBC MORPH BLD: NORMAL
WBC NRBC COR # BLD: 1.3 10*3/MM3 (ref 3.4–10.8)

## 2023-06-08 PROCEDURE — 25010000002 PROCHLORPERAZINE 10 MG/2ML SOLUTION: Performed by: STUDENT IN AN ORGANIZED HEALTH CARE EDUCATION/TRAINING PROGRAM

## 2023-06-08 PROCEDURE — 81001 URINALYSIS AUTO W/SCOPE: CPT | Performed by: INTERNAL MEDICINE

## 2023-06-08 PROCEDURE — 63710000001 INSULIN LISPRO (HUMAN) PER 5 UNITS: Performed by: NURSE PRACTITIONER

## 2023-06-08 PROCEDURE — 25010000002 HYDROMORPHONE 1 MG/ML SOLUTION: Performed by: NURSE PRACTITIONER

## 2023-06-08 PROCEDURE — 63710000001 PROMETHAZINE PER 25 MG: Performed by: NURSE PRACTITIONER

## 2023-06-08 PROCEDURE — 63710000001 INSULIN GLARGINE PER 5 UNITS: Performed by: NURSE PRACTITIONER

## 2023-06-08 PROCEDURE — 80048 BASIC METABOLIC PNL TOTAL CA: CPT | Performed by: NURSE PRACTITIONER

## 2023-06-08 PROCEDURE — 63710000001 PROCHLORPERAZINE MALEATE PER 5 MG: Performed by: NURSE PRACTITIONER

## 2023-06-08 PROCEDURE — 86945 BLOOD PRODUCT/IRRADIATION: CPT

## 2023-06-08 PROCEDURE — 85025 COMPLETE CBC W/AUTO DIFF WBC: CPT | Performed by: NURSE PRACTITIONER

## 2023-06-08 PROCEDURE — 86813 HLA TYPING A B OR C: CPT

## 2023-06-08 PROCEDURE — 25010000002 CEFTAZIDIME 2 G RECONSTITUTED SOLUTION 1 EACH VIAL: Performed by: EMERGENCY MEDICINE

## 2023-06-08 PROCEDURE — 85007 BL SMEAR W/DIFF WBC COUNT: CPT | Performed by: NURSE PRACTITIONER

## 2023-06-08 PROCEDURE — 82948 REAGENT STRIP/BLOOD GLUCOSE: CPT

## 2023-06-08 RX ORDER — IBUPROFEN 600 MG/1
1 TABLET ORAL
Status: DISCONTINUED | OUTPATIENT
Start: 2023-06-08 | End: 2023-06-10 | Stop reason: HOSPADM

## 2023-06-08 RX ORDER — ONDANSETRON 4 MG/1
4 TABLET, FILM COATED ORAL EVERY 6 HOURS PRN
Status: DISCONTINUED | OUTPATIENT
Start: 2023-06-08 | End: 2023-06-10 | Stop reason: HOSPADM

## 2023-06-08 RX ORDER — AMOXICILLIN 250 MG
2 CAPSULE ORAL 2 TIMES DAILY
Status: DISCONTINUED | OUTPATIENT
Start: 2023-06-08 | End: 2023-06-10 | Stop reason: HOSPADM

## 2023-06-08 RX ORDER — PROCHLORPERAZINE EDISYLATE 5 MG/ML
10 INJECTION INTRAMUSCULAR; INTRAVENOUS EVERY 6 HOURS PRN
Status: DISCONTINUED | OUTPATIENT
Start: 2023-06-08 | End: 2023-06-10 | Stop reason: HOSPADM

## 2023-06-08 RX ORDER — ACETAMINOPHEN 160 MG/5ML
650 SOLUTION ORAL EVERY 4 HOURS PRN
Status: DISCONTINUED | OUTPATIENT
Start: 2023-06-08 | End: 2023-06-10 | Stop reason: HOSPADM

## 2023-06-08 RX ORDER — MIDODRINE HYDROCHLORIDE 5 MG/1
10 TABLET ORAL EVERY 8 HOURS PRN
Status: DISCONTINUED | OUTPATIENT
Start: 2023-06-08 | End: 2023-06-10 | Stop reason: HOSPADM

## 2023-06-08 RX ORDER — POLYETHYLENE GLYCOL 3350 17 G/17G
17 POWDER, FOR SOLUTION ORAL DAILY PRN
Status: DISCONTINUED | OUTPATIENT
Start: 2023-06-08 | End: 2023-06-10 | Stop reason: HOSPADM

## 2023-06-08 RX ORDER — ACETAMINOPHEN 325 MG/1
650 TABLET ORAL EVERY 4 HOURS PRN
Status: DISCONTINUED | OUTPATIENT
Start: 2023-06-08 | End: 2023-06-10 | Stop reason: HOSPADM

## 2023-06-08 RX ORDER — DEXTROSE MONOHYDRATE 25 G/50ML
25 INJECTION, SOLUTION INTRAVENOUS
Status: DISCONTINUED | OUTPATIENT
Start: 2023-06-08 | End: 2023-06-10 | Stop reason: HOSPADM

## 2023-06-08 RX ORDER — SODIUM CHLORIDE 0.9 % (FLUSH) 0.9 %
10 SYRINGE (ML) INJECTION EVERY 12 HOURS SCHEDULED
Status: DISCONTINUED | OUTPATIENT
Start: 2023-06-08 | End: 2023-06-10 | Stop reason: HOSPADM

## 2023-06-08 RX ORDER — PROMETHAZINE HYDROCHLORIDE 25 MG/1
25 TABLET ORAL EVERY 6 HOURS PRN
Status: DISCONTINUED | OUTPATIENT
Start: 2023-06-08 | End: 2023-06-10 | Stop reason: HOSPADM

## 2023-06-08 RX ORDER — TIZANIDINE 4 MG/1
4 TABLET ORAL DAILY
Status: DISCONTINUED | OUTPATIENT
Start: 2023-06-08 | End: 2023-06-10 | Stop reason: HOSPADM

## 2023-06-08 RX ORDER — BISACODYL 10 MG
10 SUPPOSITORY, RECTAL RECTAL DAILY PRN
Status: DISCONTINUED | OUTPATIENT
Start: 2023-06-08 | End: 2023-06-10 | Stop reason: HOSPADM

## 2023-06-08 RX ORDER — ACETAMINOPHEN 650 MG/1
650 SUPPOSITORY RECTAL EVERY 4 HOURS PRN
Status: DISCONTINUED | OUTPATIENT
Start: 2023-06-08 | End: 2023-06-10 | Stop reason: HOSPADM

## 2023-06-08 RX ORDER — ONDANSETRON 4 MG/1
8 TABLET, ORALLY DISINTEGRATING ORAL EVERY 8 HOURS PRN
Status: DISCONTINUED | OUTPATIENT
Start: 2023-06-08 | End: 2023-06-10 | Stop reason: HOSPADM

## 2023-06-08 RX ORDER — SODIUM CHLORIDE 9 MG/ML
40 INJECTION, SOLUTION INTRAVENOUS AS NEEDED
Status: DISCONTINUED | OUTPATIENT
Start: 2023-06-08 | End: 2023-06-10 | Stop reason: HOSPADM

## 2023-06-08 RX ORDER — TRAZODONE HYDROCHLORIDE 50 MG/1
25 TABLET ORAL NIGHTLY PRN
Status: DISCONTINUED | OUTPATIENT
Start: 2023-06-08 | End: 2023-06-10 | Stop reason: HOSPADM

## 2023-06-08 RX ORDER — ALUMINA, MAGNESIA, AND SIMETHICONE 2400; 2400; 240 MG/30ML; MG/30ML; MG/30ML
15 SUSPENSION ORAL EVERY 6 HOURS PRN
Status: DISCONTINUED | OUTPATIENT
Start: 2023-06-08 | End: 2023-06-10 | Stop reason: HOSPADM

## 2023-06-08 RX ORDER — NICOTINE POLACRILEX 4 MG
15 LOZENGE BUCCAL
Status: DISCONTINUED | OUTPATIENT
Start: 2023-06-08 | End: 2023-06-10 | Stop reason: HOSPADM

## 2023-06-08 RX ORDER — PROCHLORPERAZINE MALEATE 5 MG/1
10 TABLET ORAL EVERY 6 HOURS PRN
Status: DISCONTINUED | OUTPATIENT
Start: 2023-06-08 | End: 2023-06-08

## 2023-06-08 RX ORDER — ONDANSETRON 2 MG/ML
4 INJECTION INTRAMUSCULAR; INTRAVENOUS EVERY 6 HOURS PRN
Status: DISCONTINUED | OUTPATIENT
Start: 2023-06-08 | End: 2023-06-10 | Stop reason: HOSPADM

## 2023-06-08 RX ORDER — GABAPENTIN 300 MG/1
300 CAPSULE ORAL 3 TIMES DAILY
Status: DISCONTINUED | OUTPATIENT
Start: 2023-06-08 | End: 2023-06-10 | Stop reason: HOSPADM

## 2023-06-08 RX ORDER — SODIUM CHLORIDE 0.9 % (FLUSH) 0.9 %
10 SYRINGE (ML) INJECTION AS NEEDED
Status: DISCONTINUED | OUTPATIENT
Start: 2023-06-08 | End: 2023-06-10 | Stop reason: HOSPADM

## 2023-06-08 RX ORDER — INSULIN LISPRO 100 [IU]/ML
2-9 INJECTION, SOLUTION INTRAVENOUS; SUBCUTANEOUS
Status: DISCONTINUED | OUTPATIENT
Start: 2023-06-08 | End: 2023-06-10 | Stop reason: HOSPADM

## 2023-06-08 RX ORDER — FUROSEMIDE 40 MG/1
40 TABLET ORAL DAILY
Status: DISCONTINUED | OUTPATIENT
Start: 2023-06-08 | End: 2023-06-10 | Stop reason: HOSPADM

## 2023-06-08 RX ORDER — MIRTAZAPINE 15 MG/1
15 TABLET, FILM COATED ORAL NIGHTLY
Status: DISCONTINUED | OUTPATIENT
Start: 2023-06-08 | End: 2023-06-10 | Stop reason: HOSPADM

## 2023-06-08 RX ORDER — METOPROLOL SUCCINATE 25 MG/1
25 TABLET, EXTENDED RELEASE ORAL DAILY
Status: DISCONTINUED | OUTPATIENT
Start: 2023-06-08 | End: 2023-06-10 | Stop reason: HOSPADM

## 2023-06-08 RX ORDER — BISACODYL 5 MG/1
5 TABLET, DELAYED RELEASE ORAL DAILY PRN
Status: DISCONTINUED | OUTPATIENT
Start: 2023-06-08 | End: 2023-06-10 | Stop reason: HOSPADM

## 2023-06-08 RX ADMIN — MIRTAZAPINE 15 MG: 15 TABLET, FILM COATED ORAL at 21:45

## 2023-06-08 RX ADMIN — METOPROLOL SUCCINATE 25 MG: 25 TABLET, EXTENDED RELEASE ORAL at 09:06

## 2023-06-08 RX ADMIN — Medication 10 ML: at 01:37

## 2023-06-08 RX ADMIN — OXYCODONE HYDROCHLORIDE 10 MG: 5 TABLET ORAL at 09:06

## 2023-06-08 RX ADMIN — FUROSEMIDE 40 MG: 40 TABLET ORAL at 09:06

## 2023-06-08 RX ADMIN — OXYCODONE HYDROCHLORIDE 10 MG: 5 TABLET ORAL at 01:36

## 2023-06-08 RX ADMIN — GABAPENTIN 300 MG: 300 CAPSULE ORAL at 21:45

## 2023-06-08 RX ADMIN — Medication 10 ML: at 21:46

## 2023-06-08 RX ADMIN — Medication 10 ML: at 09:08

## 2023-06-08 RX ADMIN — HYDROXYZINE HYDROCHLORIDE 25 MG: 25 TABLET, FILM COATED ORAL at 01:36

## 2023-06-08 RX ADMIN — PROCHLORPERAZINE MALEATE 10 MG: 5 TABLET ORAL at 01:40

## 2023-06-08 RX ADMIN — Medication 0.38 ML/HR: at 09:08

## 2023-06-08 RX ADMIN — OXYCODONE HYDROCHLORIDE 10 MG: 5 TABLET ORAL at 17:56

## 2023-06-08 RX ADMIN — ALPRAZOLAM 0.5 MG: 0.5 TABLET ORAL at 22:01

## 2023-06-08 RX ADMIN — EMPAGLIFLOZIN 10 MG: 10 TABLET, FILM COATED ORAL at 09:07

## 2023-06-08 RX ADMIN — GABAPENTIN 300 MG: 300 CAPSULE ORAL at 09:07

## 2023-06-08 RX ADMIN — CEFTAZIDIME 2 G: 2 INJECTION, POWDER, FOR SOLUTION INTRAVENOUS at 13:19

## 2023-06-08 RX ADMIN — TIZANIDINE 4 MG: 4 TABLET ORAL at 09:07

## 2023-06-08 RX ADMIN — CITALOPRAM HYDROBROMIDE 10 MG: 20 TABLET ORAL at 09:07

## 2023-06-08 RX ADMIN — HYDROMORPHONE HYDROCHLORIDE 0.5 MG: 1 INJECTION, SOLUTION INTRAMUSCULAR; INTRAVENOUS; SUBCUTANEOUS at 02:14

## 2023-06-08 RX ADMIN — GABAPENTIN 300 MG: 300 CAPSULE ORAL at 17:56

## 2023-06-08 RX ADMIN — ALLOPURINOL 500 MG: 100 TABLET ORAL at 09:07

## 2023-06-08 RX ADMIN — INSULIN LISPRO 2 UNITS: 100 INJECTION, SOLUTION INTRAVENOUS; SUBCUTANEOUS at 09:08

## 2023-06-08 RX ADMIN — INSULIN GLARGINE 10 UNITS: 100 INJECTION, SOLUTION SUBCUTANEOUS at 09:08

## 2023-06-08 RX ADMIN — CEFTAZIDIME 2 G: 2 INJECTION, POWDER, FOR SOLUTION INTRAVENOUS at 21:45

## 2023-06-08 RX ADMIN — CEFTAZIDIME 2 G: 2 INJECTION, POWDER, FOR SOLUTION INTRAVENOUS at 05:56

## 2023-06-08 RX ADMIN — PROMETHAZINE HYDROCHLORIDE 25 MG: 25 TABLET ORAL at 09:07

## 2023-06-08 RX ADMIN — PROCHLORPERAZINE EDISYLATE 10 MG: 5 INJECTION INTRAMUSCULAR; INTRAVENOUS at 02:14

## 2023-06-08 NOTE — ED NOTES
Nursing report ED to floor  Nieves Lawsonens  47 y.o.  female    HPI:   Chief Complaint   Patient presents with    Abnormal Lab     Pt was told to come to the ER for abnormal wbc and platelets, pt is on chemo for CML. Pt reports her bones are hurting and she does not feel well.           Admitting doctor:   Neida Brown DO    Admitting diagnosis:   The primary encounter diagnosis was Pancytopenia. A diagnosis of Chronic myelogenous leukemia was also pertinent to this visit.    Code status:   Current Code Status       Date Active Code Status Order ID Comments User Context       Prior            Allergies:   Patient has no known allergies.    Isolation:  No active isolations     Fall Risk:  Fall Risk Assessment was completed, and patient is at low risk for falls.   Predictive Model Details         15 (Low) Factor Value    Calculated 6/7/2023 22:07 Age 47    Risk of Fall Model Musculoskeletal Assessment WDL     Active Peripheral IV Present     Respiratory Rate 20     Number of Distinct Medication Classes administered 6     Skin Assessment WDL     Financial Class Other     Magnesium not on file     Total Bilirubin 2.7 mg/dL     Drug Use No     Diastolic BP 70     Paul Scale not on file     Peripheral Vascular Assessment WDL     Chloride 99 mmol/L     ALT 70 U/L     Creatinine 0.54 mg/dL     Albumin 3.9 g/dL     Gastrointestinal Assessment WDL     Number of administrations of Analgesic Narcotics 1     Cardiac Assessment WDL     Potassium 4.4 mmol/L     Days after Admission 0.114     Calcium 9.8 mg/dL         Weight:       06/07/23  1809   Weight: 57.6 kg (127 lb)       Intake and Output  No intake or output data in the 24 hours ending 06/07/23 2344    Diet:        Most recent vitals:   Vitals:    06/07/23 2216 06/07/23 2231 06/07/23 2245 06/07/23 2247   BP: 128/73 147/93 143/87 143/87   Pulse: 100 103 104 103   Resp: 12  17    Temp: 98.5 °F (36.9 °C)  98.7 °F (37.1 °C) 98.7 °F (37.1 °C)   TempSrc: Oral  Oral Oral    SpO2: 94% 94% 94% 95%   Weight:       Height:           Active LDAs/IV Access:   Lines, Drains & Airways       Active LDAs       Name Placement date Placement time Site Days    Peripheral IV 06/07/23 2040 Anterior;Left Forearm 06/07/23 2040  Forearm  less than 1    Pump Device 05/17/23 1930  --  21                    Skin Condition:   Skin Assessments (last day)       None             Labs (abnormal labs have a star):   Labs Reviewed   COMPREHENSIVE METABOLIC PANEL - Abnormal; Notable for the following components:       Result Value    Glucose 220 (*)     BUN 22 (*)     Creatinine 0.54 (*)     ALT (SGPT) 70 (*)     Alkaline Phosphatase 687 (*)     Total Bilirubin 2.7 (*)     BUN/Creatinine Ratio 40.7 (*)     All other components within normal limits    Narrative:     GFR Normal >60  Chronic Kidney Disease <60  Kidney Failure <15     CBC WITH AUTO DIFFERENTIAL - Abnormal; Notable for the following components:    WBC 1.10 (*)     RBC 2.22 (*)     Hemoglobin 6.1 (*)     Hematocrit 18.5 (*)     RDW 16.7 (*)     Platelets 23 (*)     Lymphocyte % 48.4 (*)     Monocyte % 3.9 (*)     Eosinophil % 0.1 (*)     Neutrophils, Absolute 0.50 (*)     Lymphocytes, Absolute 0.50 (*)     Monocytes, Absolute 0.00 (*)     nRBC 0.7 (*)     All other components within normal limits   POC LACTATE - Normal   BLOOD CULTURE   BLOOD CULTURE   RAINBOW DRAW    Narrative:     The following orders were created for panel order [place] Draw.  Procedure                               Abnormality         Status                     ---------                               -----------         ------                     Green Top (Gel)[[id number]]                                  Final result               Lavender Top[[id number]]                                     Final result               Gold Top - [facility][[id number]]                                   Final result               Light Blue Top[[id number]]                                   Final result                  Please view results for these tests on the individual orders.   URINALYSIS W/ CULTURE IF INDICATED   POC LACTATE   TYPE AND SCREEN   PREPARE RBC   BB ARMBAND CHECK   GREEN TOP   LAVENDER TOP   GOLD TOP - SST   LIGHT BLUE TOP   CBC AND DIFFERENTIAL    Narrative:     The following orders were created for panel order CBC & Differential.  Procedure                               Abnormality         Status                     ---------                               -----------         ------                     CBC Auto Differential[[id number]]        Abnormal            Final result               Scan Slide[[id number]]                                                                    Please view results for these tests on the individual orders.       LOC: Person, Place, Time, and Situation    Telemetry:  Telemetry    Cardiac Monitoring Ordered: yes    EKG:   No orders to display       Medications Given in the ED:   Medications   cefTAZidime (FORTAZ) 2 g in sodium chloride 0.9 % 100 mL IVPB (0 g Intravenous Stopped 6/7/23 2232)   ALPRAZolam (XANAX) tablet 0.5 mg (has no administration in time range)   budesonide-formoterol (SYMBICORT) 160-4.5 MCG/ACT inhaler 2 puff (has no administration in time range)   citalopram (CeleXA) tablet 10 mg (has no administration in time range)   hydrOXYzine (ATARAX) tablet 25 mg (has no administration in time range)   oxyCODONE (ROXICODONE) immediate release tablet 10 mg (has no administration in time range)   HYDROmorphone (DILAUDID) injection 0.5 mg (has no administration in time range)   sodium chloride 0.9 % bolus 1,728 mL (0 mL Intravenous Stopped 6/7/23 2153)   dexamethasone (DECADRON) injection 4 mg (4 mg Intravenous Given 6/7/23 2053)   acetaminophen (TYLENOL) tablet 1,000 mg (1,000 mg Oral Given 6/7/23 2053)   ondansetron (ZOFRAN) injection 4 mg (4 mg Intravenous Given 6/7/23 2053)   HYDROmorphone (DILAUDID) injection 0.5 mg (0.5 mg Intravenous Given 6/7/23 2053)    ondansetron (ZOFRAN) injection 4 mg (4 mg Intravenous Given 6/7/23 2301)   HYDROmorphone (DILAUDID) injection 0.5 mg (0.5 mg Intravenous Given 6/7/23 2301)       Imaging results:  No radiology results for the last day    Social issues:   Social History     Socioeconomic History    Marital status: Legally    Tobacco Use    Smoking status: Never    Smokeless tobacco: Never   Vaping Use    Vaping Use: Never used   Substance and Sexual Activity    Alcohol use: No    Drug use: No    Sexual activity: Defer       NIH Stroke Scale:  Interval: (not recorded)  1a. Level of Consciousness: (not recorded)  1b. LOC Questions: (not recorded)  1c. LOC Commands: (not recorded)  2. Best Gaze: (not recorded)  3. Visual: (not recorded)  4. Facial Palsy: (not recorded)  5a. Motor Arm, Left: (not recorded)  5b. Motor Arm, Right: (not recorded)  6a. Motor Leg, Left: (not recorded)  6b. Motor Leg, Right: (not recorded)  7. Limb Ataxia: (not recorded)  8. Sensory: (not recorded)  9. Best Language: (not recorded)  10. Dysarthria: (not recorded)  11. Extinction and Inattention (formerly Neglect): (not recorded)    Total (NIH Stroke Scale): (not recorded)     Additional notable assessment information:    Nursing report ED to floor:  Yumiko Abel RN   06/07/23 23:44 EDT

## 2023-06-08 NOTE — CONSULTS
Hematology/Oncology Inpatient Consultation    Patient name: Nieves Mc  : 1975  MRN: [id number]  Referring Provider: Dr. Shields  Reason for Consultation: Pancytopenia, accelerated phase CML    Chief complaint: Weakness, abnormal labs, pain    History of present illness:    Nieves Mc is a 47 y.o. female who presented to [facility] on 2023 with complaints of severe generalized pain.  Past medical history of accelerated phase CML, diabetes mellitus, chronic pain, pulmonary embolism.  Patient presented at the direction of her oncologist due to significant pain that was not relieved by her current outpatient pain regimen.  Lab work at the cancer center on 2023 showed a WBC of 1.2, hemoglobin 6.6 and platelets of 22,000.  The patient reported that she has a pain pump and is followed by [facility] pain management but that she does not believe the pain pump is working properly.    Evaluation in the ED: WBC 1.1, hemoglobin 6.1 g/dL, MCV 83.5, platelets 23,000.  Bilirubin 2.7.  Lactate was normal at 0.6 she was started on empiric IV antibiotics, given IV Decadron and IV fluid bolus.  2 units packed red blood cells were ordered.  She was admitted for further evaluation and treatment of her pancytopenia.    23  Hematology/Oncology was consulted on a patient known to us and followed in the office by Dr. Lerma for her diagnosis of CML.  Patient initially presented in  when she was seen in consultation while hospitalized.  At that time the patient was on imatinib.  In  she had progressive thrombocytosis and was transitioned to nilotinib.  After that she was lost to follow-up until she was once again seen during hospitalization in .  She was continued on the lot neb and hydroxyurea.  In 2022 she had once again been lost to follow-up for 3 months when she presented in the office after not taking nilotinib during that time.  She had shortness of breath and cough for which a  2D echocardiogram was done and showed a reduced EF of 41 to 45%.  Due to cardiomyopathy she was transitioned to imatinib and hydroxyurea.  In November 2022 a bone marrow biopsy showed her to be in accelerated phase CML and that the imatinib was not controlling her malignancy.  She was initiated on ponatinib 45 mg p.o. daily.  She had significant body pain with this dose and for that reason was reduced to 30 mg p.o. daily.  She was once again lost to follow-up for approximately 6 weeks during that time she self reduced the ponatinib to 15 mg p.o. daily due to her body pain.  She was seen in the office in May 2023 at which time her platelet count was noted to be 8000 and she was severely neutropenic for that reason her ponatinib was held and the patient was sent to the ED for evaluation.  In the ED she had a CT of the head due to complaints of a fall 2 weeks earlier which showed a old subdural hematoma.  She was seen in the office on 6/6/2023 for her hospital follow-up.  Her ponatinib was continued to be held due to her persistent thrombocytopenia.  She refused follow-up with her pain management at [facility] at the appointment and therefore plan was to request a hospice consult to help with pain management at home.  In the meantime it was planned to start the patient on oxycodone 10 mg every 6 hours as needed.    Patient with advanced CML on TKI with her course complicated by noncompliance, severe pancytopenia secondary to TKI, CML, chronic pain issues.  Patient currently has a pain pump which she does not feel is functioning but she would not go to [facility] to see her pain specialist.  She was admitted with progressive pain issues and poor control at home, pancytopenia requiring blood transfusion    He/She  has a past medical history of Bone pain, COVID-19 virus infection (01/12/2022), Diabetes mellitus, Extremity pain, Leg pain, Migraine, Pulmonary embolism, and Vision loss.    PCP: Noa  Alida Cox, APRN    History:  Past Medical History:   Diagnosis Date    Bone pain     COVID-19 virus infection 2022    Diabetes mellitus     Extremity pain     Carlin. legs pain    Leg pain     left leg greater    Migraine     Pulmonary embolism     Vision loss     doing surgery   ,   Past Surgical History:   Procedure Laterality Date    BONE MARROW BIOPSY      BREAST SURGERY      BRONCHOSCOPY N/A 2022    Procedure: BRONCHOSCOPY bilateral lung washing;  Surgeon: Charlene Camara MD;  Location: [facility] ENDOSCOPY;  Service: Pulmonary;  Laterality: N/A;  post: rule out infection vs transfusion lung injury     SECTION      CHOLECYSTECTOMY      EYE SURGERY      laser surgery due  to hemmorage--- 2021-- another surgery  lt eye11/15/21    RETINAL DETACHMENT SURGERY      SPINE SURGERY      Lombardi spinal block    TUBAL ABDOMINAL LIGATION     ,   Family History   Problem Relation Age of Onset    Diabetes Mother     Diabetes Maternal Grandmother     Heart attack Maternal Grandmother     Stroke Maternal Grandmother    ,   Social History     Tobacco Use    Smoking status: Never    Smokeless tobacco: Never   Vaping Use    Vaping Use: Never used   Substance Use Topics    Alcohol use: No    Drug use: No   ,   Medications Prior to Admission   Medication Sig Dispense Refill Last Dose    acetaminophen (TYLENOL) 325 MG tablet Take 2 tablets by mouth Every 4 (Four) Hours As Needed. Indications: Fever, Pain   2023    allopurinol (ZYLOPRIM) 100 MG tablet Take 5 tablets by mouth Daily. Indications: Disorder of Excessive Uric Acid in the Blood   2023    ALPRAZolam (Xanax) 0.5 MG tablet Take 1 tablet by mouth At Night As Needed for Anxiety. 30 tablet 0 2023    budesonide-formoterol (SYMBICORT) 160-4.5 MCG/ACT inhaler Inhale 2 puffs 2 (Two) Times a Day. 10.2 g 1 2023    citalopram (CeleXA) 10 MG tablet Take 1 tablet by mouth Daily. Indications: Generalized Anxiety Disorder   2023    Continuous Blood Gluc  Sensor (FreeStyle Zahira 2 Sensor) misc    6/7/2023    dapagliflozin Propanediol (Farxiga) 10 MG tablet Take 10 mg by mouth Daily. 90 tablet 3 6/7/2023    furosemide (LASIX) 40 MG tablet Take 1 tablet by mouth Daily. Indications: Edema   6/7/2023    gabapentin (NEURONTIN) 300 MG capsule Take 1 capsule by mouth 3 (Three) Times a Day. Indications: Diabetes with Nerve Disease   6/7/2023    hydrOXYzine (ATARAX) 25 MG tablet Take 1 tablet by mouth Every 8 (Eight) Hours As Needed. Indications: Feeling Anxious   6/7/2023    Insulin Glargine (BASAGLAR KWIKPEN) 100 UNIT/ML injection pen Inject 20 Units under the skin into the appropriate area as directed Daily. 10 mL 3 6/7/2023    metoprolol succinate XL (TOPROL-XL) 25 MG 24 hr tablet Take 1 tablet by mouth Daily. 90 tablet 3 6/7/2023    midodrine (PROAMATINE) 10 MG tablet Take 1 tablet by mouth Every 8 (Eight) Hours. 90 tablet 0 6/7/2023    mirtazapine (REMERON) 15 MG tablet Take 1 tablet by mouth every night at bedtime. 30 tablet 2 6/7/2023    ondansetron ODT (ZOFRAN-ODT) 8 MG disintegrating tablet Place 1 tablet on the tongue Every 8 (Eight) Hours As Needed for Nausea or Vomiting. 20 tablet 2 6/7/2023    tiZANidine (ZANAFLEX) 4 MG tablet Take 1 tablet by mouth Daily. Indications: Musculoskeletal Pain   6/7/2023    traZODone (DESYREL) 50 MG tablet Take 25 mg by mouth every night at bedtime. Indications: Trouble Sleeping   6/7/2023    oxyCODONE (ROXICODONE) 10 MG tablet Take 1 tablet by mouth Every 6 (Six) Hours As Needed for Moderate Pain. 40 tablet 0     pain patient supplied pump 0.375 mL/hr by Intrathecal route Daily. Active pump  Prescriber: Dr. Shelton - pain management   Med name/ concentration: Dilaudid   Last refill: yesterday   Lockout period: every 4 hours   Indications: chronic pain       prochlorperazine (COMPAZINE) 10 MG tablet Take 1 tablet by mouth Every 6 (Six) Hours As Needed for Nausea or Vomiting. 30 tablet 1     promethazine (PHENERGAN) 25 MG tablet Take  1 tablet by mouth As Needed. Indications: Nausea and Vomiting      , Scheduled Meds:  allopurinol, 500 mg, Oral, Daily  budesonide-formoterol, 2 puff, Inhalation, BID - RT  cefTAZidime, 2 g, Intravenous, Q8H  citalopram, 10 mg, Oral, Daily  empagliflozin, 10 mg, Oral, Daily  furosemide, 40 mg, Oral, Daily  gabapentin, 300 mg, Oral, TID  insulin glargine, 10 Units, Subcutaneous, Daily  insulin lispro, 2-9 Units, Subcutaneous, 4x Daily AC & at Bedtime  metoprolol succinate XL, 25 mg, Oral, Daily  mirtazapine, 15 mg, Oral, Nightly  pain, 0.375 mL/hr, Intrathecal, Daily  senna-docusate sodium, 2 tablet, Oral, BID  sodium chloride, 10 mL, Intravenous, Q12H  tiZANidine, 4 mg, Oral, Daily    , Continuous Infusions:   , PRN Meds:    acetaminophen **OR** acetaminophen **OR** acetaminophen    ALPRAZolam    aluminum-magnesium hydroxide-simethicone    senna-docusate sodium **AND** polyethylene glycol **AND** bisacodyl **AND** bisacodyl    dextrose    dextrose    glucagon (human recombinant)    hydrOXYzine    midodrine    ondansetron **OR** ondansetron    ondansetron ODT    oxyCODONE    prochlorperazine    promethazine    sodium chloride    sodium chloride    traZODone   Allergies:  Patient has no known allergies.    Subjective     ROS:  Review of Systems   Constitutional:  Positive for activity change and fatigue. Negative for chills and fever.   HENT:  Negative for congestion, drooling, ear discharge, rhinorrhea, sinus pressure and tinnitus.    Eyes:  Negative for photophobia, pain and discharge.   Respiratory:  Negative for apnea, choking and stridor.    Cardiovascular:  Negative for palpitations.   Gastrointestinal:  Negative for abdominal distention, abdominal pain and anal bleeding.   Endocrine: Negative for polydipsia and polyphagia.   Genitourinary:  Negative for decreased urine volume, flank pain and genital sores.   Musculoskeletal:  Positive for back pain. Negative for gait problem, neck pain and neck stiffness.  "  Skin:  Negative for color change, rash and wound.   Neurological:  Positive for weakness. Negative for tremors, seizures, syncope, facial asymmetry and speech difficulty.   Hematological:  Negative for adenopathy.   Psychiatric/Behavioral:  Negative for agitation, confusion, hallucinations and self-injury. The patient is not hyperactive.       Objective   Vital Signs:   /72   Pulse 96   Temp 98.5 °F (36.9 °C)   Resp 16   Ht 162.6 cm (64\")   Wt 57.6 kg (127 lb)   LMP 05/25/2022 (Approximate)   SpO2 95%   BMI 21.80 kg/m²     Physical Exam: (performed by MD)  Physical Exam  Vitals and nursing note reviewed.   Constitutional:       General: She is not in acute distress.     Appearance: She is not diaphoretic.   HENT:      Head: Normocephalic and atraumatic.   Eyes:      General: No scleral icterus.        Right eye: No discharge.         Left eye: No discharge.      Conjunctiva/sclera: Conjunctivae normal.   Neck:      Thyroid: No thyromegaly.   Cardiovascular:      Rate and Rhythm: Normal rate and regular rhythm.      Heart sounds: Normal heart sounds.     No friction rub. No gallop.   Pulmonary:      Effort: Pulmonary effort is normal. No respiratory distress.      Breath sounds: No stridor. No wheezing.   Abdominal:      General: Bowel sounds are normal. There is distension.      Palpations: Abdomen is soft. There is no mass.      Tenderness: There is no abdominal tenderness. There is no guarding or rebound.   Musculoskeletal:         General: No tenderness. Normal range of motion.      Cervical back: Normal range of motion and neck supple.   Lymphadenopathy:      Cervical: No cervical adenopathy.   Skin:     General: Skin is warm.      Findings: No erythema or rash.   Neurological:      Mental Status: She is alert and oriented to person, place, and time.      Motor: No abnormal muscle tone.   Psychiatric:         Behavior: Behavior normal.       Results Review:  Lab Results (last 48 hours)       " Procedure Component Value Units Date/Time    POC Glucose Once [[id number]]  (Abnormal) Collected: 06/08/23 1112    Specimen: Blood Updated: 06/08/23 1114     Glucose 147 mg/dL      Comment: Serial Number: [id number]:  [id number]       Urinalysis, Microscopic Only - Urine, Clean Catch [[id number]]  (Abnormal) Collected: 06/08/23 0913    Specimen: Urine, Clean Catch Updated: 06/08/23 0955     RBC, UA 0-2 /HPF      WBC, UA 3-5 /HPF      Comment: Urine culture not indicated.        Bacteria, UA 1+ /HPF      Squamous Epithelial Cells, UA 13-20 /HPF      Hyaline Casts, UA None Seen /LPF      Methodology Manual Light Microscopy    Urinalysis With Culture If Indicated - Urine, Clean Catch [[id number]]  (Abnormal) Collected: 06/08/23 0913    Specimen: Urine, Clean Catch Updated: 06/08/23 0944     Color, UA Dark Yellow     Appearance, UA Hazy     pH, UA 6.0     Specific Gravity, UA 1.023     Glucose,  mg/dL (2+)     Ketones, UA Trace     Bilirubin, UA Negative     Blood, UA Negative     Protein, UA 30 mg/dL (1+)     Leuk Esterase, UA Negative     Nitrite, UA Negative     Urobilinogen, UA 1.0 E.U./dL    Narrative:      In absence of clinical symptoms, the presence of pyuria, bacteria, and/or nitrites on the urinalysis result does not correlate with infection.    POC Glucose Once [[id number]]  (Abnormal) Collected: 06/08/23 0736    Specimen: Blood Updated: 06/08/23 0737     Glucose 172 mg/dL      Comment: Serial Number: [id number]:  [id number]       Manual Differential [[id number]]  (Abnormal) Collected: 06/08/23 0602    Specimen: Blood Updated: 06/08/23 0700     Neutrophil % 31.0 %      Lymphocyte % 59.0 %      Monocyte % 3.0 %      Bands %  5.0 %      Metamyelocyte % 1.0 %      Myelocyte % 1.0 %      Neutrophils Absolute 0.47 10*3/mm3      Lymphocytes Absolute 0.77 10*3/mm3      Monocytes Absolute 0.04 10*3/mm3      RBC Morphology Normal     WBC Morphology Normal     Platelet Estimate Decreased    Narrative:       Reviewed by Pathologist within the past 60 days on [id number]     Scan Slide [[id number]] Collected: 06/08/23 0602    Specimen: Blood Updated: 06/08/23 0700     Scan Slide --     Comment: See Manual Differential Results       CBC Auto Differential [[id number]]  (Abnormal) Collected: 06/08/23 0602    Specimen: Blood Updated: 06/08/23 0700     WBC 1.30 10*3/mm3      RBC 2.55 10*6/mm3      Hemoglobin 7.1 g/dL      Hematocrit 21.6 %      MCV 84.7 fL      MCH 28.0 pg      MCHC 33.0 g/dL      RDW 16.4 %      RDW-SD 49.0 fl      MPV 7.3 fL      Platelets 27 10*3/mm3     Narrative:      The previously reported component NRBC is no longer being reported. Previous result was 0.3 /100 WBC (Reference Range: 0.0-0.2 /100 WBC) on 6/8/2023 at 0633 EDT.    Basic Metabolic Panel [[id number]]  (Abnormal) Collected: 06/08/23 0602    Specimen: Blood Updated: 06/08/23 0655     Glucose 231 mg/dL      BUN 20 mg/dL      Creatinine 0.52 mg/dL      Sodium 135 mmol/L      Potassium 4.6 mmol/L      Chloride 101 mmol/L      CO2 21.0 mmol/L      Calcium 9.6 mg/dL      BUN/Creatinine Ratio 38.5     Anion Gap 13.0 mmol/L      eGFR 115.5 mL/min/1.73     Narrative:      GFR Normal >60  Chronic Kidney Disease <60  Kidney Failure <15      [place] Draw [[id number]] Collected: 06/07/23 2041    Specimen: Blood Updated: 06/07/23 2147    Narrative:      The following orders were created for panel order [place] Draw.  Procedure                               Abnormality         Status                     ---------                               -----------         ------                     Green Top (Gel)[[id number]]                                  Final result               Lavender Top[[id number]]                                     Final result               Gold Top - SST[[id number]]                                   Final result               Light Blue Top[[id number]]                                   Final result                 Please view results for these  tests on the individual orders.    Light Blue Top [[id number]] Collected: 06/07/23 2041    Specimen: Blood Updated: 06/07/23 2147     Extra Tube Hold for add-ons.     Comment: Auto resulted       Gold Top - SST [[id number]] Collected: 06/07/23 2041    Specimen: Blood Updated: 06/07/23 2147     Extra Tube Hold for add-ons.     Comment: Auto resulted.       Blood Culture - Blood, Hand, Right [[id number]] Collected: 06/07/23 2137    Specimen: Blood from Hand, Right Updated: 06/07/23 2141    Green Top (Gel) [[id number]] Collected: 06/07/23 2041    Specimen: Blood Updated: 06/07/23 2132    Lavender Top [[id number]] Collected: 06/07/23 2041    Specimen: Blood Updated: 06/07/23 2127     Extra Tube --    CBC & Differential [[id number]]  (Abnormal) Collected: 06/07/23 2041    Specimen: Blood Updated: 06/07/23 2127    Narrative:      The following orders were created for panel order CBC & Differential.  Procedure                               Abnormality         Status                     ---------                               -----------         ------                     CBC Auto Differential[[id number]]        Abnormal            Final result               Scan Slide[[id number]]                                                                    Please view results for these tests on the individual orders.    CBC Auto Differential [[id number]]  (Abnormal) Collected: 06/07/23 2041    Specimen: Blood Updated: 06/07/23 2126     WBC 1.10 10*3/mm3      RBC 2.22 10*6/mm3      Hemoglobin 6.1 g/dL      Hematocrit 18.5 %      MCV 83.5 fL      MCH 27.4 pg      MCHC 32.8 g/dL      RDW 16.7 %      RDW-SD 49.4 fl      MPV 7.5 fL      Platelets 23 10*3/mm3      Neutrophil % 47.2 %      Lymphocyte % 48.4 %      Monocyte % 3.9 %      Eosinophil % 0.1 %      Basophil % 0.4 %      Neutrophils, Absolute 0.50 10*3/mm3      Lymphocytes, Absolute 0.50 10*3/mm3      Monocytes, Absolute 0.00 10*3/mm3      Eosinophils, Absolute 0.00 10*3/mm3       Basophils, Absolute 0.00 10*3/mm3      nRBC 0.7 /100 WBC     Comprehensive Metabolic Panel [[id number]]  (Abnormal) Collected: 06/07/23 2041    Specimen: Blood Updated: 06/07/23 2119     Glucose 220 mg/dL      BUN 22 mg/dL      Creatinine 0.54 mg/dL      Sodium 136 mmol/L      Potassium 4.4 mmol/L      Chloride 99 mmol/L      CO2 24.0 mmol/L      Calcium 9.8 mg/dL      Total Protein 7.9 g/dL      Albumin 3.9 g/dL      ALT (SGPT) 70 U/L      AST (SGOT) 29 U/L      Alkaline Phosphatase 687 U/L      Total Bilirubin 2.7 mg/dL      Globulin 4.0 gm/dL      A/G Ratio 1.0 g/dL      BUN/Creatinine Ratio 40.7     Anion Gap 13.0 mmol/L      eGFR 114.4 mL/min/1.73     Narrative:      GFR Normal >60  Chronic Kidney Disease <60  Kidney Failure <15      Blood Culture - Blood, Arm, Left [[id number]] Collected: 06/07/23 2041    Specimen: Blood from Arm, Left Updated: 06/07/23 2050    POC Lactate [[id number]]  (Normal) Collected: 06/07/23 2043    Specimen: Blood Updated: 06/07/23 2045     Lactate 0.6 mmol/L      Comment: Serial Number: [id number]:  [id number]                Pending Results:     Imaging Reviewed:   No radiology results for the last 7 days         Assessment & Plan   ASSESSMENT  Accelerated phase CML with pancytopenia: Status post bone marrow biopsy on 11/3/2022.  Patient was on ponatinib but developed significant pancytopenia and therefore treatment has been held.  Currently hemoglobin 7.1 g/dL platelets 27,000.  Patient requires HLA matched platelets for transfusion.  Chronic pain related to malignancy: Patient does not wish to continue following up with [facility] for pain management.  At her appointment on 6/6/2023 discussion was had to consult hospice for continued pain management.  Oxycodone 10 mg p.o. every 6 hours as needed was added.  History of pulmonary embolism: Anticoagulation on hold due to thrombocytopenia.  Multiple chronic conditions: Chronic HFr EF, type 2 diabetes mellitus, chronic anxiety and  depression.    PLAN  Continue neutropenic precautions and empiric IV antibiotic therapy  Monitor CBC and transfuse HLA matched platelets for count less than 20,000 or bleeding or packed red blood cells for hemoglobin less than 7.0 g/dL  Continue to hold ponatinib due to significant pancytopenia  Continue pain management    Electronically signed by JP Varela, 06/08/23, 1:22 PM EDT.    Thank you for this consult. We will be happy to follow along with you.     Patient with advanced CML on TKI with her course complicated by noncompliance, severe pancytopenia secondary to TKI, CML, chronic pain issues.  Patient currently has a pain pump which she does not feel is functioning but she would not go to [facility] to see her pain specialist.  She was admitted with progressive pain issues and poor control at home, pancytopenia requiring blood transfusion    Physical exam significant for patient chronically ill-appearing    I reviewed the labs, progress notes    Continue pain management  Monitor her counts closely  We will continue to follow  Discussed with patient    Electronically signed by Meghann Lerma MD, 06/08/23, 6:12 PM EDT.

## 2023-06-08 NOTE — PROGRESS NOTES
[facility] Medicine Services   Daily Progress Note      Patient Name: Nieves cM  : 1975  MRN: [id number]  Primary Care Physician:  Alida Latham APRN  Date of admission: 2023      Subjective      Chief Complaint: Weakness, abnormal labs    Patient seen and examined this morning.  Complaining of pain all over, states her pain pump may not be working.  She follows with pain management at [facility] and had study done on the pain pump on the  but has not heard of results yet.  She is due to follow-up with them next month.  Denies any bleeding, chest pain or shortness of breath.    Pertinent positives as noted in HPI/subjective.  All other systems were reviewed and are negative.      Objective      Vitals:   Temp:  [98.5 °F (36.9 °C)-98.7 °F (37.1 °C)] 98.5 °F (36.9 °C)  Heart Rate:  [] 96  Resp:  [12-20] 16  BP: (106-147)/(62-93) 136/72    Physical Exam:    General: Awake, alert, chronically ill-appearing female, lying in bed, NAD  Eyes: PERRL, EOMI, conjunctivae are clear  Cardiovascular: Regular rate and rhythm, no murmurs  Respiratory: Clear to auscultation bilaterally, no wheezing or rales, unlabored breathing  Abdomen: Soft, nontender, positive bowel sounds, no guarding  Neurologic: A&O, CN grossly intact, moves all extremities spontaneously  Musculoskeletal: Generalized weakness, no other gross deformities  Skin: Warm, dry, intact         Result Review    Result Review:  I have personally reviewed the results from the time of this admission to 2023 09:42 EDT and agree with these findings:  [x]  Laboratory  [x]  Microbiology  [x]  Radiology  [x]  EKG/Telemetry   [x]  Cardiology/Vascular   []  Pathology  [x]  Old records  []  Other:          Assessment & Plan      Brief Patient Summary:  Nieves Mc is a 47 y.o. female with PMH of Accelerated phase CML followed outpatient by Dr. Lerma. Treatment has been on hold due to persistent thrombocytopenia. She had labs at the  [facility] center 6/6 that showed WBC 1.2, hgb 6.6 and plts 22. She complains of significant back pain and pain all over in her bones. She has a pain pump that has dilaudid infusing that she feels is not working properly. She has not recently followed up with [facility] pain management per Dr. Lerma's documentation. The patient was directed to the ED for further evaluation.  Noted to have severe pancytopenia with neutropenia on admission.  Neutropenic precautions started, infectious work-up also ordered.  Empiric IV ceftazidime due to history of ESBL UTI.  Heme-onc also consulted on admission.      allopurinol, 500 mg, Oral, Daily  budesonide-formoterol, 2 puff, Inhalation, BID - RT  cefTAZidime, 2 g, Intravenous, Q8H  citalopram, 10 mg, Oral, Daily  empagliflozin, 10 mg, Oral, Daily  furosemide, 40 mg, Oral, Daily  gabapentin, 300 mg, Oral, TID  insulin glargine, 10 Units, Subcutaneous, Daily  insulin lispro, 2-9 Units, Subcutaneous, 4x Daily AC & at Bedtime  metoprolol succinate XL, 25 mg, Oral, Daily  mirtazapine, 15 mg, Oral, Nightly  pain, 0.375 mL/hr, Intrathecal, Daily  senna-docusate sodium, 2 tablet, Oral, BID  sodium chloride, 10 mL, Intravenous, Q12H  tiZANidine, 4 mg, Oral, Daily             I have utilized all available, immediate resources to obtain, update, or review the patient's current medications including all prescriptions, over-the-counter products, herbals, cannabis/cannabidiol products, and vitamin.mineral/dietary (nutritional) supplements.    Active Hospital Problems:  Active Hospital Problems    Diagnosis     **Pancytopenia     NICM (nonischemic cardiomyopathy)     Depression with anxiety     History of pulmonary embolism     CML (chronic myelocytic leukemia)      Plan:     Pancytopenia with neutropenia  CML  -Hemoglobin 6.1 and ANC of <500 on admission  -2 units transfusion ordered   -maintain neutropenic precaution  -Blood cultures collected on admission, check UA to rule out UTI  -Has history of  ESBL, continue empiric IV ceftazidime for now  -Monitor CBC daily  -Heme-onc consulted    Acute on chronic pain related to malignancy  -Patient has pain pump and follows with pain management at [facility]  -Continue patient applied pain pump  -Oxycodone as needed for breakthrough pain  -Oncology managed pain doing previous admission, will defer pain management to them for now    NICM  Chronic HFrEF  -Previously underwent cardiac work-up, known EF of 44%  -Not in exacerbation currently  -Continue GDMT as tolerated    DM 2  -Continue basal insulin with sliding scale  -Blood glucose, adjust as needed    Chronic anxiety/depression  -Continue home meds, monitor    DVT prophylaxis  -SCDs only      CODE STATUS:    Level Of Support Discussed With: Patient  Code Status (Patient has no pulse and is not breathing): CPR (Attempt to Resuscitate)  Medical Interventions (Patient has pulse or is breathing): Full Support      Disposition: Pending improvement    Electronically signed by Martha Shields DO, 06/08/23, 09:42 EDT.  [facility] Hospitalist Team      Part of this note may be an electronic transcription/translation of spoken language to printed text using the Dragon Dictation System.

## 2023-06-08 NOTE — H&P
[facility] Medicine Services  History & Physical    Patient Name: Nieves Mc  : 1975  MRN: [id number]  Primary Care Physician:  Alida Latham APRN  Date of admission: 2023  Date and Time of Service: 2023 at 2330    Subjective      Chief Complaint: weakness, abnormal labs    History of Present Illness: Nieves Mc is a 47 y.o. female with PMH of Accelerated phase CML followed outpatient by Dr. Lerma. Treatment has been on hold due to persistent thrombocytopenia. She had labs at the cancer center  that showed WBC 1.2, hgb 6.6 and plts 22. She complains of significant back pain and pain all over in her bones. She has a pain pump that has dilaudid infusing that she feels is not working properly. She has not recently followed up with [facility] pain management per Dr. Lerma's documentation. The patient was directed to the ED for further evaluation. She complains of weakness but denied any shortness of breath.     In the ED pt had WBC 1.1, hgb 6.1, plts 23, lactate normal 0.6, glucose 220, bilirubin 2.7. Blood cultures drawn. Afebrile. Mildly tachycardic 105, BP normal in 120s-140s. She is on room air. She is pale on exam and ill appearing. She was started on empiric fortaz, IV decadron, and given 30cc/kg IVF bolus. Two units PRBCs ordered. She was admitted for further treatment of pancytopenia.     Review of Systems   Constitutional: Negative.   HENT: Negative.     Eyes: Negative.    Cardiovascular: Negative.    Respiratory: Negative.     Endocrine: Negative.    Hematologic/Lymphatic: Negative.    Skin: Negative.    Musculoskeletal:  Positive for back pain, joint pain and myalgias.   Gastrointestinal: Negative.    Genitourinary: Negative.    Neurological: Negative.  Positive for weakness.   Psychiatric/Behavioral: Negative.     Allergic/Immunologic: Negative.    All other systems reviewed and are negative.     Personal History     Past Medical History:   Diagnosis Date    Bone  pain     COVID-19 virus infection 2022    Diabetes mellitus     Extremity pain     Carlin. legs pain    Leg pain     left leg greater    Migraine     Pulmonary embolism     Vision loss     doing surgery       Past Surgical History:   Procedure Laterality Date    BONE MARROW BIOPSY      BREAST SURGERY      BRONCHOSCOPY N/A 2022    Procedure: BRONCHOSCOPY bilateral lung washing;  Surgeon: Charlene Camara MD;  Location: [facility] ENDOSCOPY;  Service: Pulmonary;  Laterality: N/A;  post: rule out infection vs transfusion lung injury     SECTION      CHOLECYSTECTOMY      EYE SURGERY      laser surgery due  to hemmorage--- 2021-- another surgery  lt eye11/15/21    RETINAL DETACHMENT SURGERY      SPINE SURGERY      Lombardi spinal block    TUBAL ABDOMINAL LIGATION         Family History: family history includes Diabetes in her maternal grandmother and mother; Heart attack in her maternal grandmother; Stroke in her maternal grandmother.     Social History:  reports that she has never smoked. She has never used smokeless tobacco. She reports that she does not drink alcohol and does not use drugs.    Home Medications:  Prior to Admission Medications       Prescriptions Last Dose Informant Patient Reported? Taking?    acetaminophen (TYLENOL) 325 MG tablet   Yes No    Take 2 tablets by mouth Every 4 (Four) Hours As Needed. Indications: Fever, Pain    allopurinol (ZYLOPRIM) 100 MG tablet   Yes No    Take 5 tablets by mouth Daily. Indications: Disorder of Excessive Uric Acid in the Blood    ALPRAZolam (Xanax) 0.5 MG tablet   No No    Take 1 tablet by mouth At Night As Needed for Anxiety.    budesonide-formoterol (SYMBICORT) 160-4.5 MCG/ACT inhaler   No No    Inhale 2 puffs 2 (Two) Times a Day.    citalopram (CeleXA) 10 MG tablet   Yes No    Take 1 tablet by mouth Daily. Indications: Generalized Anxiety Disorder    Continuous Blood Gluc Sensor (FreeStyle Zahira 2 Sensor) misc   Yes No    dapagliflozin Propanediol  (Farxiga) 10 MG tablet   No No    Take 10 mg by mouth Daily.    furosemide (LASIX) 40 MG tablet   Yes No    Take 1 tablet by mouth Daily. Indications: Edema    gabapentin (NEURONTIN) 300 MG capsule   Yes No    Take 1 capsule by mouth 3 (Three) Times a Day. Indications: Diabetes with Nerve Disease    hydrOXYzine (ATARAX) 25 MG tablet   Yes No    Take 1 tablet by mouth Every 8 (Eight) Hours As Needed. Indications: Feeling Anxious    Insulin Glargine (BASAGLAR KWIKPEN) 100 UNIT/ML injection pen   No No    Inject 20 Units under the skin into the appropriate area as directed Daily.    metoprolol succinate XL (TOPROL-XL) 25 MG 24 hr tablet   No No    Take 1 tablet by mouth Daily.    midodrine (PROAMATINE) 10 MG tablet   No No    Take 1 tablet by mouth Every 8 (Eight) Hours.    mirtazapine (REMERON) 15 MG tablet   No No    Take 1 tablet by mouth every night at bedtime.    ondansetron ODT (ZOFRAN-ODT) 8 MG disintegrating tablet   No No    Place 1 tablet on the tongue Every 8 (Eight) Hours As Needed for Nausea or Vomiting.    oxyCODONE (ROXICODONE) 10 MG tablet   No No    Take 1 tablet by mouth Every 6 (Six) Hours As Needed for Moderate Pain.    pain patient supplied pump   Yes No    0.375 mL/hr by Intrathecal route Daily. Active pump  Prescriber: Dr. Shelton - pain management   Med name/ concentration: Dilaudid   Last refill: yesterday   Lockout period: every 4 hours   Indications: chronic pain    prochlorperazine (COMPAZINE) 10 MG tablet   No No    Take 1 tablet by mouth Every 6 (Six) Hours As Needed for Nausea or Vomiting.    promethazine (PHENERGAN) 25 MG tablet   Yes No    Take 1 tablet by mouth As Needed. Indications: Nausea and Vomiting    tiZANidine (ZANAFLEX) 4 MG tablet   Yes No    Take 1 tablet by mouth Daily. Indications: Musculoskeletal Pain    traZODone (DESYREL) 50 MG tablet   Yes No    Take 25 mg by mouth every night at bedtime. Indications: Trouble Sleeping              Allergies:  No Known  Allergies    Objective      Vitals:   Temp:  [98.5 °F (36.9 °C)-98.7 °F (37.1 °C)] 98.7 °F (37.1 °C)  Heart Rate:  [] 96  Resp:  [12-20] 17  BP: (106-147)/(62-93) 143/85    Physical Exam  Vitals and nursing note reviewed.   Constitutional:       Appearance: She is ill-appearing.   HENT:      Head: Normocephalic and atraumatic.   Eyes:      Extraocular Movements: Extraocular movements intact.      Pupils: Pupils are equal, round, and reactive to light.   Cardiovascular:      Rate and Rhythm: Normal rate and regular rhythm.      Pulses: Normal pulses.      Heart sounds: Normal heart sounds.   Pulmonary:      Effort: Pulmonary effort is normal.      Breath sounds: Normal breath sounds.   Abdominal:      General: Bowel sounds are normal.      Palpations: Abdomen is soft.      Tenderness: There is no abdominal tenderness.   Musculoskeletal:      Comments: Moves all extremities with pain   Skin:     General: Skin is warm and dry.      Coloration: Skin is pale.   Neurological:      Mental Status: She is alert and oriented to person, place, and time.   Psychiatric:         Behavior: Behavior is withdrawn.       Result Review    Result Review:  I have personally reviewed the results from the time of this admission to 6/8/2023 00:54 EDT and agree with these findings:  [x]  Laboratory  []  Microbiology  [x]  Radiology  []  EKG/Telemetry   []  Cardiology/Vascular   []  Pathology  [x]  Old records    Assessment & Plan        Active Hospital Problems:  Active Hospital Problems    Diagnosis     **Pancytopenia     NICM (nonischemic cardiomyopathy)     Depression with anxiety     History of pulmonary embolism     CML (chronic myelocytic leukemia)      Assessment/Plan:    Pancytopenia  -WBC 1.1, hgb 6.1, plts 23, afebrile   -Iv fortaz, IV decadron, and 2 units PRBCs ordered in ED  -plts above 20; therefore, will defer plt transfusion to oncology   -vitals stable  -pt has remote subdural hemorrhage previous admission  4/2023  -reverse isolation/neutropenic precautions  -consult hematology for management    Accelerated phase CML  -treatment on hold per oncology due to thrombocytopenia    DM type 2 (per records), insulin dependent   -glucose 220  -resume home insulin once verified  -SSI AC/HS     Nonischemic Cardiomyopathy EF 44%  -cont home GDMT: BB, ARB, SGTL2, lasix     Acute on Chronic back pain  -pain pump in place, managed by pain management [facility] outpatient.   -cont home oxycodone, one time dose dilaudid      Anxiety  -cont home celexa, xanax, atarax       DVT prophylaxis: contraindicated due to thrombocytopenia       CODE STATUS:    Level Of Support Discussed With: Patient  Code Status (Patient has no pulse and is not breathing): CPR (Attempt to Resuscitate)  Medical Interventions (Patient has pulse or is breathing): Full Support    Admission Status:  I believe this patient meets inpatient status.    I discussed the patient's findings and my recommendations with patient.    This patient has been examined wearing appropriate Personal Protective Equipment  06/08/23      Electronically signed by JP Richard, 06/08/23, 12:54 AM EDT.

## 2023-06-08 NOTE — PLAN OF CARE
Goal Outcome Evaluation:      Patient alert and oriented, care plan reviewed. Patient received 1 unit of blood, platelets possible to be replaced as well later today. Patient complains of pain that is not controlled and N/V. Family at bedside.

## 2023-06-08 NOTE — DISCHARGE PLACEMENT REQUEST
"J Luis Kowalski (47 y.o. Female)       Date of Birth   1975    Social Security Number       Address   [address] [place] [address]    Home Phone   [phone]    MRN   [id number]       Islam   None    Marital Status   Legally                             Admission Date   6/7/23    Admission Type   Emergency    Admitting Provider   Neida rBown DO    Attending Provider   Martha Shields DO    Department, Room/Bed   [facility] 3A MEDICAL INPATIENT, 311/1       Discharge Date       Discharge Disposition       Discharge Destination                                 Attending Provider: Martha Shields DO    Allergies: No Known Allergies    Isolation: Contact   Infection: ESBL E coli (02/15/23)   Code Status: CPR    Ht: 162.6 cm (64\")   Wt: 57.6 kg (127 lb)    Admission Cmt: None   Principal Problem: Pancytopenia [D61.818]                   Active Insurance as of 6/7/2023       Primary Coverage       Payor Plan Insurance Group Employer/Plan Group    ANTHEM MEDICARE REPLACEMENT ANTHEM MEDICARE ADVANTAGE [id number]       Payor Plan Address Payor Plan Phone Number Payor Plan Fax Number Effective Dates    [address] [phone]  1/1/2023 - None Entered    [facility] [address]         Subscriber Name Subscriber Birth Date Member ID       J LUIS KOWALSKI 1975 [id number]                     Emergency Contacts        (Rel.) Home Phone Work Phone Mobile Phone    Sayra Cabezas (Mother) [phone] -- --    Primitivo Kowalski (Son) -- -- [phone]                "

## 2023-06-08 NOTE — CASE MANAGEMENT/SOCIAL WORK
Continued Stay Note  ZOHAIB Amor     Patient Name: Nieves Mc  MRN: [id number]  Today's Date: 6/8/2023    Admit Date: 6/7/2023    Plan: Needs screened   Discharge Plan       Row Name 06/08/23 1608       Plan    Plan Needs screened    Plan Comments Needs CM assessment.                       Phone communication or documentation only - no physical contact with patient or family.      Blayne Holloway RN

## 2023-06-08 NOTE — OUTREACH NOTE
Medical Week 2 Survey      Flowsheet Row Responses   [facility] [place] facility patient discharged from? [facility]   Does the patient have one of the following disease processes/diagnoses(primary or secondary)? Other   Week 2 attempt successful? No   Unsuccessful attempts Attempt 1   Revoke Readmitted            Shama SANTOS - Registered Nurse

## 2023-06-08 NOTE — PLAN OF CARE
Goal Outcome Evaluation:  Patient complaining of pain in her legs throughout the day. Pain pump in place and receiving po pain medication. Patient nauseous but no vomiting reported. PO nausea medication given.   Problem: Adult Inpatient Plan of Care  Goal: Plan of Care Review  Outcome: Ongoing, Progressing  Goal: Patient-Specific Goal (Individualized)  Outcome: Ongoing, Progressing  Goal: Absence of Hospital-Acquired Illness or Injury  Outcome: Ongoing, Progressing  Intervention: Identify and Manage Fall Risk  Recent Flowsheet Documentation  Taken 6/8/2023 1235 by Tatiana Chatterjee RN  Safety Promotion/Fall Prevention: safety round/check completed  Taken 6/8/2023 1000 by Tatiana Chatterjee RN  Safety Promotion/Fall Prevention: safety round/check completed  Taken 6/8/2023 0907 by Tatiana Chatterjee RN  Safety Promotion/Fall Prevention:   activity supervised   assistive device/personal items within reach   clutter free environment maintained   nonskid shoes/slippers when out of bed   room organization consistent   safety round/check completed   fall prevention program maintained  Taken 6/8/2023 0800 by Tatiana Chatterjee RN  Safety Promotion/Fall Prevention: safety round/check completed  Intervention: Prevent Skin Injury  Recent Flowsheet Documentation  Taken 6/8/2023 0907 by Tatiana Chatterjee RN  Skin Protection:   adhesive use limited   skin-to-device areas padded   skin-to-skin areas padded  Intervention: Prevent and Manage VTE (Venous Thromboembolism) Risk  Recent Flowsheet Documentation  Taken 6/8/2023 0907 by Tatiana Chatterjee RN  Activity Management: ambulated in room  Intervention: Prevent Infection  Recent Flowsheet Documentation  Taken 6/8/2023 0907 by Tatiana Chatterjee RN  Infection Prevention:   visitors restricted/screened   single patient room provided   hand hygiene promoted   equipment surfaces disinfected   environmental surveillance performed  Goal: Optimal Comfort and Wellbeing  Outcome: Ongoing,  Progressing  Intervention: Monitor Pain and Promote Comfort  Recent Flowsheet Documentation  Taken 6/8/2023 0907 by Tatiana Chatterjee RN  Pain Management Interventions:   care clustered   pain pump in use   quiet environment facilitated   see MAR  Intervention: Provide Person-Centered Care  Recent Flowsheet Documentation  Taken 6/8/2023 0907 by Tatiana Chatterjee RN  Trust Relationship/Rapport:   care explained   choices provided   emotional support provided   empathic listening provided   questions answered   questions encouraged   reassurance provided   thoughts/feelings acknowledged  Goal: Readiness for Transition of Care  Outcome: Ongoing, Progressing     Problem: Skin Injury Risk Increased  Goal: Skin Health and Integrity  Outcome: Ongoing, Progressing  Intervention: Promote and Optimize Oral Intake  Recent Flowsheet Documentation  Taken 6/8/2023 0907 by Tatiana Chatterjee RN  Oral Nutrition Promotion: medicated  Intervention: Optimize Skin Protection  Recent Flowsheet Documentation  Taken 6/8/2023 0907 by Tatiana Chatterjee RN  Pressure Reduction Techniques:   frequent weight shift encouraged   heels elevated off bed   pressure points protected   weight shift assistance provided  Pressure Reduction Devices:   pressure-redistributing mattress utilized   elbow protectors utilized   heel offloading device utilized  Skin Protection:   adhesive use limited   skin-to-device areas padded   skin-to-skin areas padded

## 2023-06-09 ENCOUNTER — HOME CARE VISIT (OUTPATIENT)
Dept: HOME HEALTH SERVICES | Facility: HOME HEALTHCARE | Age: 48
End: 2023-06-09
Payer: COMMERCIAL

## 2023-06-09 LAB
ANION GAP SERPL CALCULATED.3IONS-SCNC: 17 MMOL/L (ref 5–15)
ANISOCYTOSIS BLD QL: ABNORMAL
BUN SERPL-MCNC: 22 MG/DL (ref 6–20)
BUN/CREAT SERPL: 37.9 (ref 7–25)
CALCIUM SPEC-SCNC: 9.6 MG/DL (ref 8.6–10.5)
CHLORIDE SERPL-SCNC: 100 MMOL/L (ref 98–107)
CO2 SERPL-SCNC: 22 MMOL/L (ref 22–29)
CREAT SERPL-MCNC: 0.58 MG/DL (ref 0.57–1)
DEPRECATED RDW RBC AUTO: 49.4 FL (ref 37–54)
EGFRCR SERPLBLD CKD-EPI 2021: 112.5 ML/MIN/1.73
ERYTHROCYTE [DISTWIDTH] IN BLOOD BY AUTOMATED COUNT: 16.1 % (ref 12.3–15.4)
GLUCOSE BLDC GLUCOMTR-MCNC: 212 MG/DL (ref 70–105)
GLUCOSE BLDC GLUCOMTR-MCNC: 258 MG/DL (ref 70–105)
GLUCOSE BLDC GLUCOMTR-MCNC: 262 MG/DL (ref 70–105)
GLUCOSE BLDC GLUCOMTR-MCNC: 78 MG/DL (ref 70–105)
GLUCOSE SERPL-MCNC: 130 MG/DL (ref 65–99)
HCT VFR BLD AUTO: 20.1 % (ref 34–46.6)
HGB BLD-MCNC: 6.8 G/DL (ref 12–15.9)
LYMPHOCYTES # BLD MANUAL: 1.09 10*3/MM3 (ref 0.7–3.1)
MCH RBC QN AUTO: 27.9 PG (ref 26.6–33)
MCHC RBC AUTO-ENTMCNC: 33.6 G/DL (ref 31.5–35.7)
MCV RBC AUTO: 83.1 FL (ref 79–97)
MICROCYTES BLD QL: ABNORMAL
MYELOCYTES NFR BLD MANUAL: 2 % (ref 0–0)
NEUTROPHILS # BLD AUTO: 0.28 10*3/MM3 (ref 1.7–7)
NEUTROPHILS NFR BLD MANUAL: 20 % (ref 42.7–76)
OVALOCYTES BLD QL SMEAR: ABNORMAL
PLAT MORPH BLD: NORMAL
PLATELET # BLD AUTO: 36 10*3/MM3 (ref 140–450)
PMV BLD AUTO: 7 FL (ref 6–12)
POTASSIUM SERPL-SCNC: 3.5 MMOL/L (ref 3.5–5.2)
RBC # BLD AUTO: 2.42 10*6/MM3 (ref 3.77–5.28)
SCAN SLIDE: NORMAL
SODIUM SERPL-SCNC: 139 MMOL/L (ref 136–145)
SPHEROCYTES BLD QL SMEAR: ABNORMAL
VARIANT LYMPHS NFR BLD MANUAL: 78 % (ref 19.6–45.3)
WBC MORPH BLD: NORMAL
WBC NRBC COR # BLD: 1.4 10*3/MM3 (ref 3.4–10.8)

## 2023-06-09 PROCEDURE — 63710000001 INSULIN GLARGINE PER 5 UNITS: Performed by: NURSE PRACTITIONER

## 2023-06-09 PROCEDURE — 80048 BASIC METABOLIC PNL TOTAL CA: CPT | Performed by: INTERNAL MEDICINE

## 2023-06-09 PROCEDURE — 86902 BLOOD TYPE ANTIGEN DONOR EA: CPT

## 2023-06-09 PROCEDURE — 36430 TRANSFUSION BLD/BLD COMPNT: CPT

## 2023-06-09 PROCEDURE — 63710000001 INSULIN LISPRO (HUMAN) PER 5 UNITS: Performed by: NURSE PRACTITIONER

## 2023-06-09 PROCEDURE — 85007 BL SMEAR W/DIFF WBC COUNT: CPT | Performed by: INTERNAL MEDICINE

## 2023-06-09 PROCEDURE — 82948 REAGENT STRIP/BLOOD GLUCOSE: CPT

## 2023-06-09 PROCEDURE — P9040 RBC LEUKOREDUCED IRRADIATED: HCPCS

## 2023-06-09 PROCEDURE — 85025 COMPLETE CBC W/AUTO DIFF WBC: CPT | Performed by: INTERNAL MEDICINE

## 2023-06-09 PROCEDURE — 86900 BLOOD TYPING SEROLOGIC ABO: CPT

## 2023-06-09 PROCEDURE — 25010000002 CEFTAZIDIME 2 G RECONSTITUTED SOLUTION 1 EACH VIAL: Performed by: EMERGENCY MEDICINE

## 2023-06-09 RX ORDER — OXYCODONE HYDROCHLORIDE 5 MG/1
10 TABLET ORAL EVERY 4 HOURS PRN
Status: DISCONTINUED | OUTPATIENT
Start: 2023-06-09 | End: 2023-06-10 | Stop reason: HOSPADM

## 2023-06-09 RX ORDER — LIDOCAINE 50 MG/G
1 PATCH TOPICAL
Status: DISCONTINUED | OUTPATIENT
Start: 2023-06-09 | End: 2023-06-10 | Stop reason: HOSPADM

## 2023-06-09 RX ADMIN — CEFTAZIDIME 2 G: 2 INJECTION, POWDER, FOR SOLUTION INTRAVENOUS at 05:52

## 2023-06-09 RX ADMIN — EMPAGLIFLOZIN 10 MG: 10 TABLET, FILM COATED ORAL at 09:44

## 2023-06-09 RX ADMIN — INSULIN LISPRO 6 UNITS: 100 INJECTION, SOLUTION INTRAVENOUS; SUBCUTANEOUS at 21:42

## 2023-06-09 RX ADMIN — TIZANIDINE 4 MG: 4 TABLET ORAL at 09:44

## 2023-06-09 RX ADMIN — CITALOPRAM HYDROBROMIDE 10 MG: 20 TABLET ORAL at 09:44

## 2023-06-09 RX ADMIN — OXYCODONE HYDROCHLORIDE 10 MG: 5 TABLET ORAL at 01:13

## 2023-06-09 RX ADMIN — Medication 10 ML: at 09:49

## 2023-06-09 RX ADMIN — HYDROXYZINE HYDROCHLORIDE 25 MG: 25 TABLET, FILM COATED ORAL at 05:59

## 2023-06-09 RX ADMIN — METOPROLOL SUCCINATE 25 MG: 25 TABLET, EXTENDED RELEASE ORAL at 09:44

## 2023-06-09 RX ADMIN — INSULIN LISPRO 4 UNITS: 100 INJECTION, SOLUTION INTRAVENOUS; SUBCUTANEOUS at 09:42

## 2023-06-09 RX ADMIN — INSULIN LISPRO 6 UNITS: 100 INJECTION, SOLUTION INTRAVENOUS; SUBCUTANEOUS at 15:17

## 2023-06-09 RX ADMIN — OXYCODONE HYDROCHLORIDE 10 MG: 5 TABLET ORAL at 14:17

## 2023-06-09 RX ADMIN — OXYCODONE HYDROCHLORIDE 10 MG: 5 TABLET ORAL at 11:55

## 2023-06-09 RX ADMIN — INSULIN GLARGINE 10 UNITS: 100 INJECTION, SOLUTION SUBCUTANEOUS at 09:43

## 2023-06-09 RX ADMIN — OXYCODONE HYDROCHLORIDE 10 MG: 5 TABLET ORAL at 19:55

## 2023-06-09 RX ADMIN — GABAPENTIN 300 MG: 300 CAPSULE ORAL at 21:42

## 2023-06-09 RX ADMIN — CEFTAZIDIME 2 G: 2 INJECTION, POWDER, FOR SOLUTION INTRAVENOUS at 14:16

## 2023-06-09 RX ADMIN — CEFTAZIDIME 2 G: 2 INJECTION, POWDER, FOR SOLUTION INTRAVENOUS at 21:42

## 2023-06-09 RX ADMIN — Medication 10 ML: at 21:42

## 2023-06-09 RX ADMIN — LIDOCAINE 1 PATCH: 50 PATCH CUTANEOUS at 14:16

## 2023-06-09 RX ADMIN — OXYCODONE HYDROCHLORIDE 10 MG: 5 TABLET ORAL at 06:45

## 2023-06-09 RX ADMIN — SENNOSIDES AND DOCUSATE SODIUM 2 TABLET: 50; 8.6 TABLET ORAL at 21:42

## 2023-06-09 RX ADMIN — ALPRAZOLAM 0.5 MG: 0.5 TABLET ORAL at 21:42

## 2023-06-09 RX ADMIN — FUROSEMIDE 40 MG: 40 TABLET ORAL at 09:43

## 2023-06-09 RX ADMIN — ALLOPURINOL 500 MG: 100 TABLET ORAL at 09:44

## 2023-06-09 RX ADMIN — GABAPENTIN 300 MG: 300 CAPSULE ORAL at 09:44

## 2023-06-09 RX ADMIN — GABAPENTIN 300 MG: 300 CAPSULE ORAL at 15:18

## 2023-06-09 RX ADMIN — MIRTAZAPINE 15 MG: 15 TABLET, FILM COATED ORAL at 21:42

## 2023-06-09 NOTE — Clinical Note
Transfer Summary:    · Pt transferred to: [facility]  · Reason for Transfer: Admission for pancytopenia  · Report called to: Hospital    · Summary of Care, treatment or services provided to the patient including disciplines seeing the patient: Skilled Nursing  · Patient's progress towards goals: Ongoing  · Communicable Disease yes or no If yes, type: ESBL E Coli

## 2023-06-09 NOTE — NURSING NOTE
Patient wanted to be left alone this morning to rest. Nurse will come back to give meds at a later time. Patient requesting more frequent pain medication, nurse notified MD, waiting for response.  1415 one dose of oxycodone was given to patient with system downtime override. One dose was pulled by nurse before downtown but returned to omnicell with witness.

## 2023-06-09 NOTE — DISCHARGE PLACEMENT REQUEST
"J Luis Kowalski (47 y.o. Female)     Date of Birth   1975    Social Security Number       Address   [address] [place] [address]    Home Phone   [phone]    MRN   [id number]       Bahai   None    Marital Status   Legally                             Admission Date   6/7/23    Admission Type   Emergency    Admitting Provider   Neida Brown DO    Attending Provider   Martha Shields DO    Department, Room/Bed   [facility] 3A MEDICAL INPATIENT, 311/1       Discharge Date       Discharge Disposition       Discharge Destination                               Attending Provider: Martha Shields DO    Allergies: No Known Allergies    Isolation: Contact   Infection: ESBL E coli (02/15/23)   Code Status: CPR    Ht: 162.6 cm (64\")   Wt: 57.6 kg (127 lb)    Admission Cmt: None   Principal Problem: Pancytopenia [D61.818]                 Active Insurance as of 6/7/2023     Primary Coverage     Payor Plan Insurance Group Employer/Plan Group    ANTHEM MEDICARE REPLACEMENT ANTHEM MEDICARE ADVANTAGE [id number]     Payor Plan Address Payor Plan Phone Number Payor Plan Fax Number Effective Dates    [address] [phone]  1/1/2023 - None Entered    [facility] [address]       Subscriber Name Subscriber Birth Date Member ID       J LUIS KOWALSKI 1975 [id number]                 Emergency Contacts      (Rel.) Home Phone Work Phone Mobile Phone    Sayra Cabezas (Mother) [phone] -- --    Primitivo Kowalski (Son) -- -- [phone]              "

## 2023-06-09 NOTE — PLAN OF CARE
Problem: Adult Inpatient Plan of Care  Goal: Plan of Care Review  Outcome: Ongoing, Progressing  Goal: Patient-Specific Goal (Individualized)  Outcome: Ongoing, Progressing  Goal: Absence of Hospital-Acquired Illness or Injury  Outcome: Ongoing, Progressing  Intervention: Identify and Manage Fall Risk  Recent Flowsheet Documentation  Taken 6/9/2023 1600 by Vonnie Potter LPN  Safety Promotion/Fall Prevention: safety round/check completed  Taken 6/9/2023 1415 by Vonnie Potter LPN  Safety Promotion/Fall Prevention: safety round/check completed  Taken 6/9/2023 1200 by Vonnie Potter LPN  Safety Promotion/Fall Prevention: safety round/check completed  Taken 6/9/2023 1000 by Vonnie Potter LPN  Safety Promotion/Fall Prevention: safety round/check completed  Taken 6/9/2023 0753 by Vonnie Potter LPN  Safety Promotion/Fall Prevention: safety round/check completed  Intervention: Prevent Skin Injury  Recent Flowsheet Documentation  Taken 6/9/2023 0753 by Vonnie Potter LPN  Skin Protection:   adhesive use limited   tubing/devices free from skin contact  Intervention: Prevent and Manage VTE (Venous Thromboembolism) Risk  Recent Flowsheet Documentation  Taken 6/9/2023 0753 by Vonnie Potter LPN  VTE Prevention/Management: (patient up adlib)   compression stockings off   sequential compression devices off  Range of Motion:   active ROM (range of motion) encouraged   ROM (range of motion) performed  Intervention: Prevent Infection  Recent Flowsheet Documentation  Taken 6/9/2023 1600 by Vonnie Potter LPN  Infection Prevention:   cohorting utilized   single patient room provided  Taken 6/9/2023 1415 by Vonnie Potter LPN  Infection Prevention:   cohorting utilized   single patient room provided  Taken 6/9/2023 1200 by Vonnie Potter LPN  Infection Prevention:   cohorting utilized   single patient room provided  Taken 6/9/2023 1000 by Vonnie Potter LPN  Infection Prevention:   cohorting utilized   single patient room  provided  Taken 6/9/2023 0753 by Vonnie Potter LPN  Infection Prevention:   cohorting utilized   single patient room provided  Goal: Optimal Comfort and Wellbeing  Outcome: Ongoing, Progressing  Intervention: Monitor Pain and Promote Comfort  Recent Flowsheet Documentation  Taken 6/9/2023 1600 by Vonnie Potter LPN  Pain Management Interventions:   see MAR   position adjusted  Taken 6/9/2023 1509 by Vonnie Potter LPN  Pain Management Interventions: see MAR  Taken 6/9/2023 1200 by Vonnie Potter LPN  Pain Management Interventions: (outpatient pain pump)   pain pump in use   pillow support provided   position adjusted   see MAR  Taken 6/9/2023 0753 by Vonnie Potter LPN  Pain Management Interventions:   see MAR   position adjusted   pillow support provided  Intervention: Provide Person-Centered Care  Recent Flowsheet Documentation  Taken 6/9/2023 0753 by Vonnie Potter LPN  Trust Relationship/Rapport: care explained  Goal: Readiness for Transition of Care  Outcome: Ongoing, Progressing     Problem: Skin Injury Risk Increased  Goal: Skin Health and Integrity  Outcome: Ongoing, Progressing  Intervention: Optimize Skin Protection  Recent Flowsheet Documentation  Taken 6/9/2023 0753 by Vonnie Potter LPN  Head of Bed (HOB) Positioning: HOB elevated  Pressure Reduction Devices: pressure-redistributing mattress utilized  Skin Protection:   adhesive use limited   tubing/devices free from skin contact     Problem: Fall Injury Risk  Goal: Absence of Fall and Fall-Related Injury  Outcome: Ongoing, Progressing  Intervention: Identify and Manage Contributors  Recent Flowsheet Documentation  Taken 6/9/2023 1600 by Vonnie Potter LPN  Medication Review/Management: medications reviewed  Taken 6/9/2023 1415 by Vonnie Potter LPN  Medication Review/Management: medications reviewed  Taken 6/9/2023 1200 by Vonnie Potter LPN  Medication Review/Management: medications reviewed  Taken 6/9/2023 1000 by Vonnie Potter LPN  Medication  Review/Management: medications reviewed  Taken 6/9/2023 0753 by Vonnie Potter LPN  Medication Review/Management: medications reviewed  Intervention: Promote Injury-Free Environment  Recent Flowsheet Documentation  Taken 6/9/2023 1600 by Vonnie Potter LPN  Safety Promotion/Fall Prevention: safety round/check completed  Taken 6/9/2023 1415 by Vonnie Potter LPN  Safety Promotion/Fall Prevention: safety round/check completed  Taken 6/9/2023 1200 by Vonnie Potter LPN  Safety Promotion/Fall Prevention: safety round/check completed  Taken 6/9/2023 1000 by Vonnie Potter LPN  Safety Promotion/Fall Prevention: safety round/check completed  Taken 6/9/2023 0753 by Vonnie Potter LPN  Safety Promotion/Fall Prevention: safety round/check completed   Goal Outcome Evaluation:

## 2023-06-09 NOTE — CASE MANAGEMENT/SOCIAL WORK
Discharge Planning Assessment   Mt     Patient Name: Nieves Mc  MRN: [id number]  Today's Date: 6/9/2023    Admit Date: 6/7/2023    Plan: D/C plan: Anticipate home with family. Referral to [facility] for EOS. Current with [facility].   Discharge Needs Assessment     Row Name 06/09/23 2240       Living Environment    People in Home child(tobin), adult    Name(s) of People in Home daughter, 2 sons    Current Living Arrangements home    Potentially Unsafe Housing Conditions none    Primary Care Provided by self    Provides Primary Care For child(tobin)    Family Caregiver if Needed child(tobin), adult    Quality of Family Relationships unable to assess    Able to Return to Prior Arrangements yes       Resource/Environmental Concerns    Resource/Environmental Concerns none    Transportation Concerns none       Transition Planning    Patient/Family Anticipates Transition to home with family    Patient/Family Anticipated Services at Transition none    Transportation Anticipated family or friend will provide;public transportation  One of her kids or get an Uber       Discharge Needs Assessment    Readmission Within the Last 30 Days no previous admission in last 30 days    Equipment Currently Used at Home wheelchair;walker, rolling;commode;shower chair    Concerns to be Addressed discharge planning    Anticipated Changes Related to Illness none    Equipment Needed After Discharge none    Provided Post Acute Provider List? N/A               Discharge Plan     Row Name 06/09/23 5161       Plan    Plan D/C plan: Anticipate home with family. Referral to [facility] for EOS. Current with [facility].    Patient/Family in Agreement with Plan yes    Plan Comments CM was notified that hospice consult was placed. Per ONC MD, pt is needing more pain management/control than they can offer, and suggested that hospice might be a useful referral. CM met with pt at bedside. Pt lives at home with her daughter and 2 sons, states they are 16 and up. She  does not drive. At d/c, either one of her kids will pick her up or she states she will get an uber. She has a walker, wheelchair, BSC, and shower chair at home. PCP and pharmacy confirmed. Pt declines M2B this time. She states that sometimes her medications are too costly, even with using goodrx. Pt is current with [facility] [place], confirmed by liaison. CM discussed the referral that was made for hospice and inquired if pt is wanting hospice services. Pt then discussed the pain control is not working for her, and needs someone that can help her. Pt states she has talked with someone from \[facility]\"" before and they could not. CM offered to have someone from \[facility]\"" come talk to her for EOS to see if they could help. Pt is agreeable. CM sent referral and notified liaison.              Continued Care and Services - Admitted Since 6/7/2023     Home Medical Care     Service Provider Request Status Selected Services Address Phone Fax Patient Preferred    [facility]  [facility] [address] [place] [address] [phone] --    [facility] Pending - Request Sent N/A [address], [place] [id number] [phone] --    [facility] Pending - Request Sent N/A [address] [place] [address] [phone] --                             Demographic Summary     Row Name 06/09/23 1704       General Information    Admission Type inpatient    Arrived From emergency department    Required Notices Provided Important Message from Medicare    Referral Source admission list    Reason for Consult discharge planning    Preferred Language English               Functional Status     Row Name 06/09/23 1705       Functional Status    Usual Activity Tolerance moderate    Current Activity Tolerance fair       Functional Status, IADL    Medications assistive equipment    Meal Preparation assistive equipment    Housekeeping  assistive equipment    Laundry assistive equipment    Shopping assistive equipment                     Met with patient in room     Maintained distance greater than six feet and spent less than 15 minutes in the room.          Blayne Holloway RN

## 2023-06-09 NOTE — CASE MANAGEMENT/SOCIAL WORK
Case Management Readmission Assessment Note    Case Management Readmission Assessment (all recorded)     Readmission Interview     Row Name 06/09/23 1716             Readmission Indications    Is this hospitalization related to the prior hospital diagnosis? Yes      What was the reason you were admitted? Related to cancer, blood counts      [facility] Name 06/09/23 1716             Recommendation for rehospitalization    Did you speak with your physician prior to coming to the hospital Yes      If yes, what physician did you speak with? Florentin      Who recommended you return to the hospital? Specialist  ONC      Did you seek care elsewhere prior to coming to the hospital? No      [facility] Name 06/09/23 1716             Follow up appointment    Do you have a PCP? Yes      Did you have an appointment with PCP/specialist after hospitalization within 7 days? Yes  Florentin-Oncology      Did you keep your appointment? Yes      [facility] Name 06/09/23 1716             Medications    Did you have newly prescribed medications at discharge? Yes      Did you understand the reasons for your medications at discharge and how to take them? No      Did you understand the side effects of your medications? No      Are you taking all of you prescribed medications? Yes      [facility] Name 06/09/23 1716             Discharge Instructions    Did you understand your discharge instructions? Yes      Did your family/caregiver hear your instructions? Yes      Were you told to eat a special diet? No      Did you adhere to the diet? --  N/A      Were you given a number of someone to call if you had questions or concerns? Yes      [facility] Name 06/09/23 1716             Index discharge location/services    Where did you go upon discharge? Home with services      Do you have supportive family or friends in the home? Yes      What services were arranged at discharge? Home Health      [facility] Name 06/09/23 1716             Discharge Readiness    On a scale of 1-5 (5 being well  prepared), how ready were you for discharge 5

## 2023-06-09 NOTE — PROGRESS NOTES
Hematology/Oncology Inpatient Progress Note    PATIENT NAME: Nieves Mc  : 1975  MRN: [id number]    CHIEF COMPLAINT: Weakness, abnormal labs, pain    HISTORY OF PRESENT ILLNESS:      Nieves Mc is a 47 y.o. female who presented to [facility] on 2023 with complaints of severe generalized pain.  Past medical history of accelerated phase CML, diabetes mellitus, chronic pain, pulmonary embolism.  Patient presented at the direction of her oncologist due to significant pain that was not relieved by her current outpatient pain regimen.  Lab work at the cancer center on 2023 showed a WBC of 1.2, hemoglobin 6.6 and platelets of 22,000.  The patient reported that she has a pain pump and is followed by [facility] pain management but that she does not believe the pain pump is working properly.     Evaluation in the ED: WBC 1.1, hemoglobin 6.1 g/dL, MCV 83.5, platelets 23,000.  Bilirubin 2.7.  Lactate was normal at 0.6 she was started on empiric IV antibiotics, given IV Decadron and IV fluid bolus.  2 units packed red blood cells were ordered.  She was admitted for further evaluation and treatment of her pancytopenia.     23  Hematology/Oncology was consulted on a patient known to us and followed in the office by Dr. Lerma for her diagnosis of CML.  Patient initially presented in  when she was seen in consultation while hospitalized.  At that time the patient was on imatinib.  In  she had progressive thrombocytosis and was transitioned to nilotinib.  After that she was lost to follow-up until she was once again seen during hospitalization in .  She was continued on the lot neb and hydroxyurea.  In 2022 she had once again been lost to follow-up for 3 months when she presented in the office after not taking nilotinib during that time.  She had shortness of breath and cough for which a 2D echocardiogram was done and showed a reduced EF of 41 to 45%.  Due to cardiomyopathy  she was transitioned to imatinib and hydroxyurea.  In November 2022 a bone marrow biopsy showed her to be in accelerated phase CML and that the imatinib was not controlling her malignancy.  She was initiated on ponatinib 45 mg p.o. daily.  She had significant body pain with this dose and for that reason was reduced to 30 mg p.o. daily.  She was once again lost to follow-up for approximately 6 weeks during that time she self reduced the ponatinib to 15 mg p.o. daily due to her body pain.  She was seen in the office in May 2023 at which time her platelet count was noted to be 8000 and she was severely neutropenic for that reason her ponatinib was held and the patient was sent to the ED for evaluation.  In the ED she had a CT of the head due to complaints of a fall 2 weeks earlier which showed a old subdural hematoma.  She was seen in the office on 6/6/2023 for her hospital follow-up.  Her ponatinib was continued to be held due to her persistent thrombocytopenia.  She refused follow-up with her pain management at [facility] at the appointment and therefore plan was to request a hospice consult to help with pain management at home.  In the meantime it was planned to start the patient on oxycodone 10 mg every 6 hours as needed.     Patient with advanced CML on TKI with her course complicated by noncompliance, severe pancytopenia secondary to TKI, CML, chronic pain issues.  Patient currently has a pain pump which she does not feel is functioning but she would not go to [facility] to see her pain specialist.  She was admitted with progressive pain issues and poor control at home, pancytopenia requiring blood transfusion     He/She  has a past medical history of Bone pain, COVID-19 virus infection (01/12/2022), Diabetes mellitus, Extremity pain, Leg pain, Migraine, Pulmonary embolism, and Vision loss.     PCP: Alida Latham APRN    Subjective      Patient feels slightly better.    ROS:  Review of  "Systems   Constitutional: Positive for fatigue.   Gastrointestinal: Positive for abdominal pain.   Neurological: Positive for weakness.       MEDICATIONS:    Scheduled Meds:  allopurinol, 500 mg, Oral, Daily  budesonide-formoterol, 2 puff, Inhalation, BID - RT  cefTAZidime, 2 g, Intravenous, Q8H  citalopram, 10 mg, Oral, Daily  empagliflozin, 10 mg, Oral, Daily  furosemide, 40 mg, Oral, Daily  gabapentin, 300 mg, Oral, TID  insulin glargine, 10 Units, Subcutaneous, Daily  insulin lispro, 2-9 Units, Subcutaneous, 4x Daily AC & at Bedtime  lidocaine, 1 patch, Transdermal, Q24H  metoprolol succinate XL, 25 mg, Oral, Daily  mirtazapine, 15 mg, Oral, Nightly  pain, 0.375 mL/hr, Intrathecal, Daily  senna-docusate sodium, 2 tablet, Oral, BID  sodium chloride, 10 mL, Intravenous, Q12H  tiZANidine, 4 mg, Oral, Daily       Continuous Infusions:      PRN Meds:  •  acetaminophen **OR** acetaminophen **OR** acetaminophen  •  ALPRAZolam  •  aluminum-magnesium hydroxide-simethicone  •  senna-docusate sodium **AND** polyethylene glycol **AND** bisacodyl **AND** bisacodyl  •  dextrose  •  dextrose  •  glucagon (human recombinant)  •  hydrOXYzine  •  midodrine  •  ondansetron **OR** ondansetron  •  ondansetron ODT  •  oxyCODONE  •  prochlorperazine  •  promethazine  •  sodium chloride  •  sodium chloride  •  traZODone     ALLERGIES:  No Known Allergies    Objective    VITALS:   BP 98/63   Pulse 102   Temp 98 °F (36.7 °C)   Resp 14   Ht 162.6 cm (64\")   Wt 57.6 kg (127 lb)   LMP 05/25/2022 (Approximate)   SpO2 100%   BMI 21.80 kg/m²     PHYSICAL EXAM: (performed by MD)  Physical Exam  Vitals and nursing note reviewed.   Constitutional:       General: She is not in acute distress.     Appearance: She is not diaphoretic.   HENT:      Head: Normocephalic and atraumatic.   Eyes:      General: No scleral icterus.        Right eye: No discharge.         Left eye: No discharge.      Conjunctiva/sclera: Conjunctivae normal.   Neck:    "   Thyroid: No thyromegaly.   Cardiovascular:      Rate and Rhythm: Normal rate and regular rhythm.      Heart sounds: Normal heart sounds.     No friction rub. No gallop.   Pulmonary:      Effort: Pulmonary effort is normal. No respiratory distress.      Breath sounds: No stridor. No wheezing.   Abdominal:      General: Bowel sounds are normal.      Palpations: Abdomen is soft. There is no mass.      Tenderness: There is no abdominal tenderness. There is no guarding or rebound.   Musculoskeletal:         General: No tenderness. Normal range of motion.      Cervical back: Normal range of motion and neck supple.   Lymphadenopathy:      Cervical: No cervical adenopathy.   Skin:     General: Skin is warm.      Findings: No erythema or rash.   Neurological:      Mental Status: She is alert and oriented to person, place, and time.      Motor: No abnormal muscle tone.   Psychiatric:         Behavior: Behavior normal.           RECENT LABS:  Lab Results (last 24 hours)     Procedure Component Value Units Date/Time    POC Glucose Once [[id number]]  (Normal) Collected: 06/09/23 1711    Specimen: Blood Updated: 06/09/23 1712     Glucose 78 mg/dL      Comment: Serial Number: [id number]:  [id number]       Manual Differential [[id number]]  (Abnormal) Collected: 06/09/23 1551    Specimen: Blood Updated: 06/09/23 1652     Neutrophil % 20.0 %      Lymphocyte % 78.0 %      Myelocyte % 2.0 %      Neutrophils Absolute 0.28 10*3/mm3      Lymphocytes Absolute 1.09 10*3/mm3      Anisocytosis Slight/1+     Microcytes Slight/1+     Ovalocytes Slight/1+     Spherocytes Slight/1+     WBC Morphology Normal     Platelet Morphology Normal    Narrative:      Reviewed by Pathologist within the past 30 days on 5/18/23 .     CBC & Differential [[id number]]  (Abnormal) Collected: 06/09/23 1551    Specimen: Blood Updated: 06/09/23 1652    Narrative:      The following orders were created for panel order CBC & Differential.  Procedure                                Abnormality         Status                     ---------                               -----------         ------                     CBC Auto Differential[[id number]]        Abnormal            Final result               Scan Slide[[id number]]                                       Final result                 Please view results for these tests on the individual orders.    Scan Slide [[id number]] Collected: 06/09/23 1551    Specimen: Blood Updated: 06/09/23 1652     Scan Slide --     Comment: See Manual Differential Results       CBC Auto Differential [[id number]]  (Abnormal) Collected: 06/09/23 1551    Specimen: Blood Updated: 06/09/23 1651     WBC 1.40 10*3/mm3      RBC 2.42 10*6/mm3      Hemoglobin 6.8 g/dL      Hematocrit 20.1 %      MCV 83.1 fL      MCH 27.9 pg      MCHC 33.6 g/dL      RDW 16.1 %      RDW-SD 49.4 fl      MPV 7.0 fL      Platelets 36 10*3/mm3     POC Glucose Once [[id number]]  (Abnormal) Collected: 06/09/23 1409    Specimen: Blood Updated: 06/09/23 1410     Glucose 262 mg/dL      Comment: Serial Number: [id number]:  [id number]       POC Glucose Once [[id number]]  (Abnormal) Collected: 06/09/23 0750    Specimen: Blood Updated: 06/09/23 0751     Glucose 212 mg/dL      Comment: Serial Number: [id number]:  [id number]       Basic Metabolic Panel [[id number]]  (Abnormal) Collected: 06/09/23 0646    Specimen: Blood Updated: 06/09/23 0734     Glucose 130 mg/dL      BUN 22 mg/dL      Creatinine 0.58 mg/dL      Sodium 139 mmol/L      Potassium 3.5 mmol/L      Chloride 100 mmol/L      CO2 22.0 mmol/L      Calcium 9.6 mg/dL      BUN/Creatinine Ratio 37.9     Anion Gap 17.0 mmol/L      eGFR 112.5 mL/min/1.73     Narrative:      GFR Normal >60  Chronic Kidney Disease <60  Kidney Failure <15      Blood Culture - Blood, Hand, Right [[id number]]  (Normal) Collected: 06/07/23 2137    Specimen: Blood from Hand, Right Updated: 06/08/23 2146     Blood Culture No growth at 24 hours    POC Glucose  Once [[id number]]  (Abnormal) Collected: 06/08/23 2107    Specimen: Blood Updated: 06/08/23 2109     Glucose 116 mg/dL      Comment: Serial Number: [id number]:  [id number]       Blood Culture - Blood, Arm, Left [[id number]]  (Normal) Collected: 06/07/23 2041    Specimen: Blood from Arm, Left Updated: 06/08/23 2100     Blood Culture No growth at 24 hours    Narrative:      Less than seven (7) mL's of blood was collected.  Insufficient quantity may yield false negative results.          PENDING RESULTS:     IMAGING REVIEWED:  No radiology results for the last day    Assessment & Plan   ASSESSMENT:  1. Accelerated phase CML with pancytopenia: Status post bone marrow biopsy on 11/3/2022.  Patient was on ponatinib but developed significant pancytopenia and therefore treatment has been held.  Hemoglobin 7.1 g/dL, platelets 27,000 on 6/8/2023.  Current CBC pending.  Patient requires HLA matched platelets for transfusions.  2. Chronic pain related to malignancy: Patient does not wish to continue following up with [facility] for pain management.  At her appointment on 6/6/2023 discussion was had to consult hospice for continued pain management only.  Oxycodone 10 mg p.o. every 6 hours as needed was added.  3. History of pulmonary embolism: Anticoagulation on hold due to thrombocytopenia  4. Multiple chronic conditions: Chronic HFrEF, type 2 diabetes mellitus, chronic anxiety and depression.    PLAN:  1. Continue neutropenic precautions and empiric IV antibiotic therapy  2. Monitor CBC and transfuse HLA matched platelets for count less than 20,000 or for bleeding.  Transfuse packed red blood cells for hemoglobin less than 7.0 g/dL.  Patient to receive 1 unit of packed red blood cells today for a hemoglobin of 6.8.  3. Continue to hold ponatinib due to significant pancytopenia  4. Continue pain management      Patient with advanced CML on TKI with her course complicated by noncompliance, severe pancytopenia secondary to TKI,  CML, chronic pain issues.  Patient currently has a pain pump which she does not feel is functioning but she would not go to [facility] to see her pain specialist.  She was admitted with progressive pain issues and poor control at home, pancytopenia requiring blood transfusion     Physical exam significant for patient chronically ill-appearing     I reviewed the labs, progress notes     Continue pain management.  Patient is receiving oxycodone 10 mg every 4 hours as needed.  Lidoderm patch applied  Hemoglobin is down to 6.8 g per DL therefore patient received 1 unit of blood today  Platelets are up to 36,000 which is an improvement  Monitor her counts closely  We will continue to follow  Discussed with patient    Electronically signed by Meghann Lerma MD, 06/09/23, 6:05 PM EDT.

## 2023-06-09 NOTE — HOME HEALTH
Patient is current with [facility]. Patient was admitted to [facility] on 06/08/2023. We will continue to follow and resume care once discharged home. Thank you.

## 2023-06-10 VITALS
SYSTOLIC BLOOD PRESSURE: 96 MMHG | TEMPERATURE: 97.8 F | HEART RATE: 100 BPM | HEIGHT: 64 IN | WEIGHT: 119.27 LBS | RESPIRATION RATE: 12 BRPM | OXYGEN SATURATION: 96 % | DIASTOLIC BLOOD PRESSURE: 61 MMHG | BODY MASS INDEX: 20.36 KG/M2

## 2023-06-10 LAB
ANION GAP SERPL CALCULATED.3IONS-SCNC: 15 MMOL/L (ref 5–15)
ANISOCYTOSIS BLD QL: ABNORMAL
BASOPHILS # BLD MANUAL: 0.02 10*3/MM3 (ref 0–0.2)
BASOPHILS NFR BLD MANUAL: 1 % (ref 0–1.5)
BH BB BLOOD EXPIRATION DATE: NORMAL
BH BB BLOOD TYPE BARCODE: 9500
BH BB DISPENSE STATUS: NORMAL
BH BB PRODUCT CODE: NORMAL
BH BB UNIT NUMBER: NORMAL
BUN SERPL-MCNC: 23 MG/DL (ref 6–20)
BUN/CREAT SERPL: 33.8 (ref 7–25)
CALCIUM SPEC-SCNC: 9.5 MG/DL (ref 8.6–10.5)
CHLORIDE SERPL-SCNC: 100 MMOL/L (ref 98–107)
CO2 SERPL-SCNC: 24 MMOL/L (ref 22–29)
CREAT SERPL-MCNC: 0.68 MG/DL (ref 0.57–1)
CROSSMATCH INTERPRETATION: NORMAL
DEPRECATED RDW RBC AUTO: 50.8 FL (ref 37–54)
EGFRCR SERPLBLD CKD-EPI 2021: 108.3 ML/MIN/1.73
EOSINOPHIL # BLD MANUAL: 0.02 10*3/MM3 (ref 0–0.4)
EOSINOPHIL NFR BLD MANUAL: 1 % (ref 0.3–6.2)
ERYTHROCYTE [DISTWIDTH] IN BLOOD BY AUTOMATED COUNT: 16.6 % (ref 12.3–15.4)
GLUCOSE BLDC GLUCOMTR-MCNC: 173 MG/DL (ref 70–105)
GLUCOSE BLDC GLUCOMTR-MCNC: 186 MG/DL (ref 70–105)
GLUCOSE SERPL-MCNC: 190 MG/DL (ref 65–99)
HCT VFR BLD AUTO: 25 % (ref 34–46.6)
HGB BLD-MCNC: 8.6 G/DL (ref 12–15.9)
LARGE PLATELETS: ABNORMAL
LYMPHOCYTES # BLD MANUAL: 1.21 10*3/MM3 (ref 0.7–3.1)
LYMPHOCYTES NFR BLD MANUAL: 1 % (ref 5–12)
MCH RBC QN AUTO: 28.6 PG (ref 26.6–33)
MCHC RBC AUTO-ENTMCNC: 34.5 G/DL (ref 31.5–35.7)
MCV RBC AUTO: 82.8 FL (ref 79–97)
MONOCYTES # BLD: 0.02 10*3/MM3 (ref 0.1–0.9)
MYELOCYTES NFR BLD MANUAL: 1 % (ref 0–0)
NEUTROPHILS # BLD AUTO: 0.43 10*3/MM3 (ref 1.7–7)
NEUTROPHILS NFR BLD MANUAL: 23 % (ref 42.7–76)
NEUTS BAND NFR BLD MANUAL: 2 % (ref 0–5)
PLATELET # BLD AUTO: 39 10*3/MM3 (ref 140–450)
PMV BLD AUTO: 7.7 FL (ref 6–12)
POIKILOCYTOSIS BLD QL SMEAR: ABNORMAL
POTASSIUM SERPL-SCNC: 3.7 MMOL/L (ref 3.5–5.2)
RBC # BLD AUTO: 3.02 10*6/MM3 (ref 3.77–5.28)
SCAN SLIDE: NORMAL
SMALL PLATELETS BLD QL SMEAR: ABNORMAL
SODIUM SERPL-SCNC: 139 MMOL/L (ref 136–145)
UNIT  ABO: NORMAL
UNIT  RH: NORMAL
VARIANT LYMPHS NFR BLD MANUAL: 2 % (ref 0–5)
VARIANT LYMPHS NFR BLD MANUAL: 69 % (ref 19.6–45.3)
WBC MORPH BLD: NORMAL
WBC NRBC COR # BLD: 1.7 10*3/MM3 (ref 3.4–10.8)

## 2023-06-10 PROCEDURE — 85025 COMPLETE CBC W/AUTO DIFF WBC: CPT | Performed by: NURSE PRACTITIONER

## 2023-06-10 PROCEDURE — 80048 BASIC METABOLIC PNL TOTAL CA: CPT | Performed by: INTERNAL MEDICINE

## 2023-06-10 PROCEDURE — 82948 REAGENT STRIP/BLOOD GLUCOSE: CPT

## 2023-06-10 PROCEDURE — 25010000002 CEFTAZIDIME 2 G RECONSTITUTED SOLUTION 1 EACH VIAL: Performed by: EMERGENCY MEDICINE

## 2023-06-10 PROCEDURE — 85007 BL SMEAR W/DIFF WBC COUNT: CPT | Performed by: NURSE PRACTITIONER

## 2023-06-10 PROCEDURE — 63710000001 INSULIN GLARGINE PER 5 UNITS: Performed by: NURSE PRACTITIONER

## 2023-06-10 RX ORDER — LEVOFLOXACIN 500 MG/1
500 TABLET, FILM COATED ORAL DAILY
Qty: 4 TABLET | Refills: 0 | Status: SHIPPED | OUTPATIENT
Start: 2023-06-10 | End: 2023-06-14

## 2023-06-10 RX ADMIN — LIDOCAINE 1 PATCH: 50 PATCH CUTANEOUS at 10:00

## 2023-06-10 RX ADMIN — CITALOPRAM HYDROBROMIDE 10 MG: 20 TABLET ORAL at 10:00

## 2023-06-10 RX ADMIN — TIZANIDINE 4 MG: 4 TABLET ORAL at 10:03

## 2023-06-10 RX ADMIN — EMPAGLIFLOZIN 10 MG: 10 TABLET, FILM COATED ORAL at 10:00

## 2023-06-10 RX ADMIN — OXYCODONE HYDROCHLORIDE 10 MG: 5 TABLET ORAL at 10:00

## 2023-06-10 RX ADMIN — METOPROLOL SUCCINATE 25 MG: 25 TABLET, EXTENDED RELEASE ORAL at 10:00

## 2023-06-10 RX ADMIN — ALLOPURINOL 500 MG: 100 TABLET ORAL at 10:00

## 2023-06-10 RX ADMIN — INSULIN GLARGINE 10 UNITS: 100 INJECTION, SOLUTION SUBCUTANEOUS at 10:00

## 2023-06-10 RX ADMIN — CEFTAZIDIME 2 G: 2 INJECTION, POWDER, FOR SOLUTION INTRAVENOUS at 05:10

## 2023-06-10 RX ADMIN — OXYCODONE HYDROCHLORIDE 10 MG: 5 TABLET ORAL at 05:06

## 2023-06-10 RX ADMIN — GABAPENTIN 300 MG: 300 CAPSULE ORAL at 10:00

## 2023-06-10 RX ADMIN — FUROSEMIDE 40 MG: 40 TABLET ORAL at 10:00

## 2023-06-10 NOTE — PLAN OF CARE
Goal Outcome Evaluation:           Progress: no change  Outcome Evaluation: patient completed 1 unit PRBC. awaiting morning hemoglobin lab draw. PAtient in bed with call light in reach able to make needs known.

## 2023-06-10 NOTE — PROGRESS NOTES
Hematology/Oncology Inpatient Progress Note    PATIENT NAME: Nieves Mc  : 1975  MRN: [id number]    CHIEF COMPLAINT: Weakness, abnormal labs, pain    HISTORY OF PRESENT ILLNESS:      Nieves Mc is a 47 y.o. female who presented to [facility] on 2023 with complaints of severe generalized pain.  Past medical history of accelerated phase CML, diabetes mellitus, chronic pain, pulmonary embolism.  Patient presented at the direction of her oncologist due to significant pain that was not relieved by her current outpatient pain regimen.  Lab work at the cancer center on 2023 showed a WBC of 1.2, hemoglobin 6.6 and platelets of 22,000.  The patient reported that she has a pain pump and is followed by [facility] pain management but that she does not believe the pain pump is working properly.     Evaluation in the ED: WBC 1.1, hemoglobin 6.1 g/dL, MCV 83.5, platelets 23,000.  Bilirubin 2.7.  Lactate was normal at 0.6 she was started on empiric IV antibiotics, given IV Decadron and IV fluid bolus.  2 units packed red blood cells were ordered.  She was admitted for further evaluation and treatment of her pancytopenia.     23  Hematology/Oncology was consulted on a patient known to us and followed in the office by Dr. Lerma for her diagnosis of CML.  Patient initially presented in  when she was seen in consultation while hospitalized.  At that time the patient was on imatinib.  In  she had progressive thrombocytosis and was transitioned to nilotinib.  After that she was lost to follow-up until she was once again seen during hospitalization in .  She was continued on the lot neb and hydroxyurea.  In 2022 she had once again been lost to follow-up for 3 months when she presented in the office after not taking nilotinib during that time.  She had shortness of breath and cough for which a 2D echocardiogram was done and showed a reduced EF of 41 to 45%.  Due to cardiomyopathy  she was transitioned to imatinib and hydroxyurea.  In November 2022 a bone marrow biopsy showed her to be in accelerated phase CML and that the imatinib was not controlling her malignancy.  She was initiated on ponatinib 45 mg p.o. daily.  She had significant body pain with this dose and for that reason was reduced to 30 mg p.o. daily.  She was once again lost to follow-up for approximately 6 weeks during that time she self reduced the ponatinib to 15 mg p.o. daily due to her body pain.  She was seen in the office in May 2023 at which time her platelet count was noted to be 8000 and she was severely neutropenic for that reason her ponatinib was held and the patient was sent to the ED for evaluation.  In the ED she had a CT of the head due to complaints of a fall 2 weeks earlier which showed a old subdural hematoma.  She was seen in the office on 6/6/2023 for her hospital follow-up.  Her ponatinib was continued to be held due to her persistent thrombocytopenia.  She refused follow-up with her pain management at [facility] at the appointment and therefore plan was to request a hospice consult to help with pain management at home.  In the meantime it was planned to start the patient on oxycodone 10 mg every 6 hours as needed.     Patient with advanced CML on TKI with her course complicated by noncompliance, severe pancytopenia secondary to TKI, CML, chronic pain issues.  Patient currently has a pain pump which she does not feel is functioning but she would not go to [facility] to see her pain specialist.  She was admitted with progressive pain issues and poor control at home, pancytopenia requiring blood transfusion     He/She  has a past medical history of Bone pain, COVID-19 virus infection (01/12/2022), Diabetes mellitus, Extremity pain, Leg pain, Migraine, Pulmonary embolism, and Vision loss.     PCP: Alida Latham APRN    Subjective      Patient with no new issues today    ROS:    Review  "of Systems   Constitutional: Positive for fatigue.   Gastrointestinal: Positive for abdominal pain.   Neurological: Positive for weakness.       MEDICATIONS:    Scheduled Meds:  allopurinol, 500 mg, Oral, Daily  budesonide-formoterol, 2 puff, Inhalation, BID - RT  cefTAZidime, 2 g, Intravenous, Q8H  citalopram, 10 mg, Oral, Daily  empagliflozin, 10 mg, Oral, Daily  furosemide, 40 mg, Oral, Daily  gabapentin, 300 mg, Oral, TID  insulin glargine, 10 Units, Subcutaneous, Daily  insulin lispro, 2-9 Units, Subcutaneous, 4x Daily AC & at Bedtime  lidocaine, 1 patch, Transdermal, Q24H  metoprolol succinate XL, 25 mg, Oral, Daily  mirtazapine, 15 mg, Oral, Nightly  pain, 0.375 mL/hr, Intrathecal, Daily  senna-docusate sodium, 2 tablet, Oral, BID  sodium chloride, 10 mL, Intravenous, Q12H  tiZANidine, 4 mg, Oral, Daily       Continuous Infusions:      PRN Meds:  •  acetaminophen **OR** acetaminophen **OR** acetaminophen  •  ALPRAZolam  •  aluminum-magnesium hydroxide-simethicone  •  senna-docusate sodium **AND** polyethylene glycol **AND** bisacodyl **AND** bisacodyl  •  dextrose  •  dextrose  •  glucagon (human recombinant)  •  hydrOXYzine  •  midodrine  •  ondansetron **OR** ondansetron  •  ondansetron ODT  •  oxyCODONE  •  prochlorperazine  •  promethazine  •  sodium chloride  •  sodium chloride  •  traZODone     ALLERGIES:  No Known Allergies    Objective    VITALS:   BP 96/61 (BP Location: Left arm, Patient Position: Lying)   Pulse 100   Temp 97.8 °F (36.6 °C)   Resp 12   Ht 162.6 cm (64\")   Wt 54.1 kg (119 lb 4.3 oz)   LMP 05/25/2022 (Approximate)   SpO2 96%   BMI 20.47 kg/m²     PHYSICAL EXAM: (performed by MD)  Physical Exam  Vitals and nursing note reviewed.   Constitutional:       General: She is not in acute distress.     Appearance: She is not diaphoretic.   HENT:      Head: Normocephalic and atraumatic.   Eyes:      General: No scleral icterus.        Right eye: No discharge.         Left eye: No " discharge.      Conjunctiva/sclera: Conjunctivae normal.   Neck:      Thyroid: No thyromegaly.   Cardiovascular:      Rate and Rhythm: Normal rate and regular rhythm.      Heart sounds: Normal heart sounds.     No friction rub. No gallop.   Pulmonary:      Effort: Pulmonary effort is normal. No respiratory distress.      Breath sounds: No stridor. No wheezing.   Abdominal:      General: Bowel sounds are normal.      Palpations: Abdomen is soft. There is no mass.      Tenderness: There is no abdominal tenderness. There is no guarding or rebound.   Musculoskeletal:         General: No tenderness. Normal range of motion.      Cervical back: Normal range of motion and neck supple.   Lymphadenopathy:      Cervical: No cervical adenopathy.   Skin:     General: Skin is warm.      Findings: No erythema or rash.   Neurological:      Mental Status: She is alert and oriented to person, place, and time.      Motor: No abnormal muscle tone.   Psychiatric:         Behavior: Behavior normal.       I have reexamined the patient and the results are consistent with the previously documented exam. Meghann Lerma MD     RECENT LABS:    Lab Results (last 24 hours)     Procedure Component Value Units Date/Time    POC Glucose Once [[id number]]  (Abnormal) Collected: 06/10/23 0737    Specimen: Blood Updated: 06/10/23 0738     Glucose 173 mg/dL      Comment: Serial Number: [id number]:  [id number]       Manual Differential [[id number]]  (Abnormal) Collected: 06/10/23 0518    Specimen: Blood Updated: 06/10/23 0730     Neutrophil % 23.0 %      Lymphocyte % 69.0 %      Monocyte % 1.0 %      Eosinophil % 1.0 %      Basophil % 1.0 %      Bands %  2.0 %      Myelocyte % 1.0 %      Atypical Lymphocyte % 2.0 %      Neutrophils Absolute 0.43 10*3/mm3      Lymphocytes Absolute 1.21 10*3/mm3      Monocytes Absolute 0.02 10*3/mm3      Eosinophils Absolute 0.02 10*3/mm3      Basophils Absolute 0.02 10*3/mm3      Anisocytosis Slight/1+      Poikilocytes Slight/1+     WBC Morphology Normal     Platelet Estimate Decreased     Large Platelets Slight/1+    CBC & Differential [[id number]]  (Abnormal) Collected: 06/10/23 0518    Specimen: Blood Updated: 06/10/23 0730    Narrative:      The following orders were created for panel order CBC & Differential.  Procedure                               Abnormality         Status                     ---------                               -----------         ------                     CBC Auto Differential[[id number]]        Abnormal            Final result               Scan Slide[[id number]]                                       Final result                 Please view results for these tests on the individual orders.    Scan Slide [[id number]] Collected: 06/10/23 0518    Specimen: Blood Updated: 06/10/23 0730     Scan Slide --     Comment: See Manual Differential Results       CBC Auto Differential [[id number]]  (Abnormal) Collected: 06/10/23 0518    Specimen: Blood Updated: 06/10/23 0730     WBC 1.70 10*3/mm3      RBC 3.02 10*6/mm3      Hemoglobin 8.6 g/dL      Hematocrit 25.0 %      Comment: Result checked          MCV 82.8 fL      MCH 28.6 pg      MCHC 34.5 g/dL      RDW 16.6 %      RDW-SD 50.8 fl      MPV 7.7 fL      Platelets 39 10*3/mm3     Narrative:      The previously reported component NRBC is no longer being reported. Previous result was 0.8 /100 WBC (Reference Range: 0.0-0.2 /100 WBC) on 6/10/2023 at 0633 EDT.    Basic Metabolic Panel [[id number]]  (Abnormal) Collected: 06/10/23 0518    Specimen: Blood Updated: 06/10/23 0617     Glucose 190 mg/dL      BUN 23 mg/dL      Creatinine 0.68 mg/dL      Sodium 139 mmol/L      Potassium 3.7 mmol/L      Chloride 100 mmol/L      CO2 24.0 mmol/L      Calcium 9.5 mg/dL      BUN/Creatinine Ratio 33.8     Anion Gap 15.0 mmol/L      eGFR 108.3 mL/min/1.73     Narrative:      GFR Normal >60  Chronic Kidney Disease <60  Kidney Failure <15      Blood Culture - Blood, Hand,  Right [[id number]]  (Normal) Collected: 06/07/23 2137    Specimen: Blood from Hand, Right Updated: 06/09/23 2146     Blood Culture No growth at 2 days    POC Glucose Once [[id number]]  (Abnormal) Collected: 06/09/23 2134    Specimen: Blood Updated: 06/09/23 2136     Glucose 258 mg/dL      Comment: Serial Number: [id number]:  [id number]       Blood Culture - Blood, Arm, Left [[id number]]  (Normal) Collected: 06/07/23 2041    Specimen: Blood from Arm, Left Updated: 06/09/23 2101     Blood Culture No growth at 2 days    Narrative:      Less than seven (7) mL's of blood was collected.  Insufficient quantity may yield false negative results.    POC Glucose Once [[id number]]  (Normal) Collected: 06/09/23 1711    Specimen: Blood Updated: 06/09/23 1712     Glucose 78 mg/dL      Comment: Serial Number: [id number]:  [id number]       Manual Differential [[id number]]  (Abnormal) Collected: 06/09/23 1551    Specimen: Blood Updated: 06/09/23 1652     Neutrophil % 20.0 %      Lymphocyte % 78.0 %      Myelocyte % 2.0 %      Neutrophils Absolute 0.28 10*3/mm3      Lymphocytes Absolute 1.09 10*3/mm3      Anisocytosis Slight/1+     Microcytes Slight/1+     Ovalocytes Slight/1+     Spherocytes Slight/1+     WBC Morphology Normal     Platelet Morphology Normal    Narrative:      Reviewed by Pathologist within the past 30 days on 5/18/23 .     CBC & Differential [[id number]]  (Abnormal) Collected: 06/09/23 1551    Specimen: Blood Updated: 06/09/23 1652    Narrative:      The following orders were created for panel order CBC & Differential.  Procedure                               Abnormality         Status                     ---------                               -----------         ------                     CBC Auto Differential[[id number]]        Abnormal            Final result               Scan Slide[[id number]]                                       Final result                 Please view results for these tests on the  individual orders.    Scan Slide [[id number]] Collected: 06/09/23 1551    Specimen: Blood Updated: 06/09/23 1652     Scan Slide --     Comment: See Manual Differential Results       CBC Auto Differential [[id number]]  (Abnormal) Collected: 06/09/23 1551    Specimen: Blood Updated: 06/09/23 1651     WBC 1.40 10*3/mm3      RBC 2.42 10*6/mm3      Hemoglobin 6.8 g/dL      Hematocrit 20.1 %      MCV 83.1 fL      MCH 27.9 pg      MCHC 33.6 g/dL      RDW 16.1 %      RDW-SD 49.4 fl      MPV 7.0 fL      Platelets 36 10*3/mm3     POC Glucose Once [[id number]]  (Abnormal) Collected: 06/09/23 1409    Specimen: Blood Updated: 06/09/23 1410     Glucose 262 mg/dL      Comment: Serial Number: [id number]:  [id number]             PENDING RESULTS:     IMAGING REVIEWED:  No radiology results for the last day    Assessment & Plan   ASSESSMENT:  1. Accelerated phase CML with pancytopenia: Status post bone marrow biopsy on 11/3/2022.  Patient was on ponatinib but developed significant pancytopenia and therefore treatment has been held.  Hemoglobin 7.1 g/dL, platelets 27,000 on 6/8/2023.  Current CBC pending.  Patient requires HLA matched platelets for transfusions.  Patient is not requiring platelet transfusion  2. Chronic pain related to malignancy: Patient does not wish to continue following up with [facility] for pain management.  At her appointment on 6/6/2023 discussion was had to consult hospice for continued pain management only.  Oxycodone 10 mg p.o. every 4 hours as needed  3. History of pulmonary embolism: Off anticoagulation therapy  4. Multiple chronic conditions: Chronic HFrEF, type 2 diabetes mellitus, chronic anxiety and depression.    PLAN:  1. Continue neutropenic precautions and empiric IV antibiotic therapy.  Transition to p.o. Levaquin  2. Monitor CBC and transfuse HLA matched platelets for count less than 20,000 or for bleeding.  Transfuse packed red blood cells for hemoglobin less than 7.0 g/dL.  She does not need  transfusions today  3. Continue to hold ponatinib due to significant pancytopenia  4. Continue pain management  5. Follow-up with me next week  6. Continue 3 times a week CBCs          Electronically signed by Meghann Lerma MD, 06/10/23, 11:47 AM EDT.

## 2023-06-10 NOTE — CASE MANAGEMENT/SOCIAL WORK
Continued Stay Note  ZOHAIB Amor     Patient Name: Nieves Mc  MRN: [id number]  Today's Date: 6/10/2023    Admit Date: 6/7/2023    Plan: D/C plan: Anticipate home with family. [facility]. Current with [facility].   Discharge Plan       Row Name 06/10/23 1855       Plan    Final Discharge Disposition Code 01 - home or self-care    Final Note home                      Expected Discharge Date and Time       Expected Discharge Date Expected Discharge Time    Anibal 10, 2023               Yumiko Mendes RN

## 2023-06-10 NOTE — DISCHARGE SUMMARY
[facility] Medicine Services   DISCHARGE SUMMARY    Patient Name: Nieves Mc  : 1975  MRN: [id number]    Date of Admission: 2023  Date of Discharge:  06/10/23    Primary Care Physician: Alida Latham APRN      Presenting Problem:   Chronic myelogenous leukemia [C92.10]  Pancytopenia [D61.818]    Active and Resolved Hospital Problems:  Active Hospital Problems    Diagnosis POA   • **Pancytopenia [D61.818] Yes   • NICM (nonischemic cardiomyopathy) [I42.8] Yes   • Depression with anxiety [F41.8] Yes   • History of pulmonary embolism [Z86.711] Yes   • CML (chronic myelocytic leukemia) [C92.10] Yes      Resolved Hospital Problems   No resolved problems to display.         Hospital Course     Hospital Course:  Nieves Mc is a 47 y.o. female with PMH of Accelerated phase CML followed outpatient by Dr. Lerma. Treatment has been on hold due to persistent thrombocytopenia. She had labs at the cancer center  that showed WBC 1.2, hgb 6.6 and plts 22. She complains of significant back pain and pain all over in her bones. She has a pain pump that has dilaudid infusing that she feels is not working properly. She has not recently followed up with [facility] pain management per Dr. Lerma's documentation. The patient was directed to the ED for further evaluation.  Noted to have severe pancytopenia with neutropenia on admission.  Neutropenic precautions started, infectious work-up also ordered.  Empiric IV ceftazidime due to history of ESBL UTI.  Heme-onc also consulted on admission.  Patient pain was managed by restarting home pain pump and oxycodone every 4 hours as needed per oncology.  She did receive multiple units of PRBCs during this admission.  Her CBC now remained stable and improving.  Cultures so far remain negative, still remains neutropenic however.  Switch to p.o. Levaquin for prophylaxis on discharge, discussed with oncology.  Okay to discharge per heme-onc with outpatient  follow-up.  Hospice to evaluate patient as outpatient for pain control.  No further inpatient work-up required.  Patient feels better and wants to go home today.  Patient stable to discharge home with follow-up with PCP and heme-onc as an outpatient.    A/P:    Pancytopenia with neutropenia  CML  -Hemoglobin 6.1 and ANC of <500 on admission  - multiple units of PRBCs transfused during this admission  -maintain neutropenic precaution, neutropenia improving  -Blood cultures NGTD, UA appears bland, patient denies any urinary symptoms  -Has history of ESBL, continue empiric IV ceftazidime for now, de-escalate to p.o. Levaquin if patient remains neutropenic, discussed with oncology  -Okay to discharge per heme-onc with plan for repeat CBC in the office next week     Acute on chronic pain related to malignancy  -Patient has pain pump and follows with pain management at [facility] but does not want to return to them anymore  -Continue patient applied pain pump  -Oxycodone as needed for breakthrough pain  -Oncology managed pain doing previous admission, will defer pain management to them for now  -Hospice to evaluate patient outpatient for further pain management per heme-onc     NICM  Chronic HFrEF  -Previously underwent cardiac work-up, known EF of 44%  -Not in exacerbation currently  -Continue GDMT as tolerated     DM 2  -Continue basal insulin with sliding scale  -Blood glucose, adjust as needed     Chronic anxiety/depression  -Continue home meds        DISCHARGE Follow Up Recommendations for labs and diagnostics:   Follow-up with PCP and heme-onc.  Repeat CBC in 2 to 3 days.    Reasons For Change In Medications and Indications for New Medications:  Levaquin added for prophylaxis.  Continue oxycodone every 4-6 hours as needed, discussed with patient    Day of Discharge     Vital Signs:  Temp:  [97.7 °F (36.5 °C)-98 °F (36.7 °C)] 97.8 °F (36.6 °C)  Heart Rate:  [] 100  Resp:  [12-18] 12  BP: ()/(59-76)  96/61    Physical Exam:  General: Awake, alert, chronically ill-appearing female, lying in bed, NAD  Cardiovascular: Regular rate and rhythm, no murmurs  Respiratory: Clear to auscultation bilaterally, no wheezing or rales, unlabored breathing  Abdomen: Soft, nontender, positive bowel sounds, no guarding  Musculoskeletal: Generalized weakness, no other gross deformities  Skin: Warm, dry        Pertinent  and/or Most Recent Results     LAB RESULTS:      Lab 06/10/23  0518 06/09/23  1551 06/08/23  0602 06/07/23 2043 06/07/23 2041 06/07/23  1320   WBC 1.70* 1.40* 1.30*  --  1.10* 1.20*   HEMOGLOBIN 8.6* 6.8* 7.1*  --  6.1* 6.6*   HEMATOCRIT 25.0* 20.1* 21.6*  --  18.5* 20.2*   PLATELETS 39* 36* 27*  --  23* 22*   NEUTROS ABS 0.43* 0.28* 0.47*  --  0.50* 0.37*   LYMPHS ABS  --   --   --   --  0.50*  --    MONOS ABS  --   --   --   --  0.00*  --    EOS ABS 0.02  --   --   --  0.00  --    MCV 82.8 83.1 84.7  --  83.5 85.3   LACTATE  --   --   --  0.6  --   --          Lab 06/10/23  0518 06/09/23  0646 06/08/23 0602 06/07/23 2041 06/06/23  1107   SODIUM 139 139 135* 136 140   POTASSIUM 3.7 3.5 4.6 4.4 5.0   CHLORIDE 100 100 101 99 103   CO2 24.0 22.0 21.0* 24.0 20.0*   ANION GAP 15.0 17.0* 13.0 13.0 17.0*   BUN 23* 22* 20 22* 28*   CREATININE 0.68 0.58 0.52* 0.54* 0.73   EGFR 108.3 112.5 115.5 114.4 102.2   GLUCOSE 190* 130* 231* 220* 207*   CALCIUM 9.5 9.6 9.6 9.8 9.6         Lab 06/07/23 2041 06/06/23  1107   TOTAL PROTEIN 7.9 7.2   ALBUMIN 3.9 3.9   GLOBULIN 4.0 3.3   ALT (SGPT) 70* 103*   AST (SGOT) 29 97*   BILIRUBIN 2.7* 1.2   ALK PHOS 687* 793*                 Lab 06/07/23 2041   ABO TYPING A   RH TYPING Positive   ANTIBODY SCREEN Negative         Brief Urine Lab Results  (Last result in the past 365 days)      Color   Clarity   Blood   Leuk Est   Nitrite   Protein   CREAT   Urine HCG        06/08/23 0913 Dark Yellow   Hazy   Negative   Negative   Negative   30 mg/dL (1+)               Microbiology Results  (last 10 days)     Procedure Component Value - Date/Time    Blood Culture - Blood, Hand, Right [[id number]]  (Normal) Collected: 06/07/23 2137    Lab Status: Preliminary result Specimen: Blood from Hand, Right Updated: 06/09/23 2146     Blood Culture No growth at 2 days    Blood Culture - Blood, Arm, Left [[id number]]  (Normal) Collected: 06/07/23 2041    Lab Status: Preliminary result Specimen: Blood from Arm, Left Updated: 06/09/23 2101     Blood Culture No growth at 2 days    Narrative:      Less than seven (7) mL's of blood was collected.  Insufficient quantity may yield false negative results.          XR Ribs Bilateral 3 View    Result Date: 5/25/2023  Impression: No acute findings. Electronically Signed: Hari Ray  5/25/2023 11:40 PM EDT  Workstation ID: [id number]    CT Head Without Contrast    Result Date: 5/23/2023  Impression: Impression: Chronic bilateral subdural hygromas, right greater than left, unchanged from 5/18/2023. No acute intracranial finding. Electronically Signed: Heather Kelsey  5/23/2023 3:11 PM EDT  Workstation ID: [id number]    CT Head Without Contrast    Result Date: 5/18/2023  Impression: Impression: Persistent bilateral chronic subdural hematoma/hygromas are present, 7 mm in maximal thickness on the right with some mild underlying sulcal effacement. While nonspecific and with potentially numerous etiologies, findings could be seen with intracranial hypotension or possibly remote or unreported trauma. Consider contrast-enhanced MRI when able, to further evaluate. Electronically Signed: Apolinar Byrne  5/18/2023 5:38 AM EDT  Workstation ID: [id number]    CT Head Without Contrast    Result Date: 5/17/2023  Impression: Bilateral subdural hygromas suggesting remote subdural hemorrhage. There is local mass effect with sulcal effacement and decreased of the size of the lateral ventricles but no evidence of herniation at this time No acute intracranial hemorrhage appreciated. Recommend follow-up,  further evaluation as clinically indicated Findings were discussed with the ordering provider JP Kent at the time of interpretation Electronically Signed: Freddy Toribio  5/17/2023 7:28 PM EDT  Workstation ID: [id number]      Results for orders placed during the hospital encounter of 11/03/22    Duplex Venous Lower Extremity - Bilateral CAR    Interpretation Summary  •  Chronic right lower extremity superficial thrombophlebitis noted in the small saphenous.  •  All other veins appeared normal bilaterally.      Results for orders placed during the hospital encounter of 11/03/22    Duplex Venous Lower Extremity - Bilateral CAR    Interpretation Summary  •  Chronic right lower extremity superficial thrombophlebitis noted in the small saphenous.  •  All other veins appeared normal bilaterally.      Results for orders placed during the hospital encounter of 03/10/23    Adult Transthoracic Echo Complete W/ Cont if Necessary Per Protocol    Interpretation Summary  •  Left ventricular systolic function is mildly decreased. Calculated left ventricular EF = 44%  •  The left ventricular cavity is moderately dilated.  •  Left ventricular diastolic function was indeterminate.  •  Severe tricuspid valve regurgitation is present.  •  Estimated right ventricular systolic pressure from tricuspid regurgitation is moderately elevated (45-55 mmHg).  •  The inferior vena cava is dilated. IVC inspiratory collapse is absent.      Labs Pending at Discharge:  Pending Labs     Order Current Status    Blood Culture - Blood, Arm, Left Preliminary result    Blood Culture - Blood, Hand, Right Preliminary result          Procedures Performed           Consults:   Consults     Date and Time Order Name Status Description    6/8/2023 12:45 AM Hematology & Oncology Inpatient Consult Completed     5/18/2023 12:34 AM Hematology and Oncology (on-call MD unless specified) Completed     5/18/2023 12:08 AM Inpatient Neurosurgery Consult  Completed             Discharge Details        Discharge Medications      New Medications      Instructions Start Date   levoFLOXacin 500 MG tablet  Commonly known as: Levaquin   500 mg, Oral, Daily         Continue These Medications      Instructions Start Date   acetaminophen 325 MG tablet  Commonly known as: TYLENOL   2 tablets, Oral, Every 4 Hours PRN      allopurinol 100 MG tablet  Commonly known as: ZYLOPRIM   5 tablets, Oral, Daily      ALPRAZolam 0.5 MG tablet  Commonly known as: Xanax   0.5 mg, Oral, Nightly PRN      BASAGLAR KWIKPEN 100 UNIT/ML injection pen   20 Units, Subcutaneous, Daily      citalopram 10 MG tablet  Commonly known as: CeleXA   1 tablet, Oral, Daily      Farxiga 10 MG tablet  Generic drug: dapagliflozin Propanediol   10 mg, Oral, Daily      FreeStyle Zahira 2 Sensor misc   No dose, route, or frequency recorded.      furosemide 40 MG tablet  Commonly known as: LASIX   1 tablet, Oral, Daily      gabapentin 300 MG capsule  Commonly known as: NEURONTIN   1 capsule, Oral, 3 Times Daily      hydrOXYzine 25 MG tablet  Commonly known as: ATARAX   1 tablet, Oral, Every 8 Hours PRN      metoprolol succinate XL 25 MG 24 hr tablet  Commonly known as: TOPROL-XL   25 mg, Oral, Daily      midodrine 10 MG tablet  Commonly known as: PROAMATINE   10 mg, Oral, Every 8 Hours      mirtazapine 15 MG tablet  Commonly known as: REMERON   15 mg, Oral, Every Night at Bedtime      ondansetron ODT 8 MG disintegrating tablet  Commonly known as: ZOFRAN-ODT   8 mg, Translingual, Every 8 Hours PRN      oxyCODONE 10 MG tablet  Commonly known as: ROXICODONE   10 mg, Oral, Every 6 Hours PRN      pain  patient supplied pump   0.375 mL/hr, Intrathecal, Daily, Active pump Prescriber: Dr. Shelton - pain management  Med name/ concentration: Dilaudid  Last refill: yesterday  Lockout period: every 4 hours       prochlorperazine 10 MG tablet  Commonly known as: COMPAZINE   10 mg, Oral, Every 6 Hours PRN      promethazine 25 MG  tablet  Commonly known as: PHENERGAN   1 tablet, Oral, As Needed      Symbicort 160-4.5 MCG/ACT inhaler  Generic drug: budesonide-formoterol   2 puffs, Inhalation, 2 Times Daily - RT      tiZANidine 4 MG tablet  Commonly known as: ZANAFLEX   1 tablet, Oral, Daily      traZODone 50 MG tablet  Commonly known as: DESYREL   25 mg, Oral, Every Night at Bedtime             No Known Allergies      Discharge Disposition:  Home or Self Care    Diet:  Hospital:  Diet Order   Procedures   • Diet: Diabetic Diets; Consistent Carbohydrate; Neutropenic/Low Microbial; Texture: Regular Texture (IDDSI 7); Fluid Consistency: Thin (IDDSI 0)         Discharge Activity:         CODE STATUS:  Code Status and Medical Interventions:   Ordered at: 06/08/23 0054     Level Of Support Discussed With:    Patient     Code Status (Patient has no pulse and is not breathing):    CPR (Attempt to Resuscitate)     Medical Interventions (Patient has pulse or is breathing):    Full Support         Future Appointments   Date Time Provider Department Center   6/14/2023 10:00 AM Meghann Lerma MD [facility]   6/14/2023 10:00 AM LAB [facility]           Time spent on Discharge including face to face service:  35 minutes    Signature:    Electronically signed by Martha Shields DO, 06/10/23, 10:08 AM EDT.      Part of this note may be an electronic transcription/translation of spoken language to printed text using the Dragon Dictation System.

## 2023-06-11 ENCOUNTER — READMISSION MANAGEMENT (OUTPATIENT)
Dept: CALL CENTER | Facility: HOSPITAL | Age: 48
End: 2023-06-11
Payer: MEDICARE

## 2023-06-11 ENCOUNTER — TRANSCRIBE ORDERS (OUTPATIENT)
Dept: HOME HEALTH SERVICES | Facility: HOME HEALTHCARE | Age: 48
End: 2023-06-11
Payer: COMMERCIAL

## 2023-06-11 DIAGNOSIS — C92.10 CHRONIC MYELOID LEUKEMIA: Primary | ICD-10-CM

## 2023-06-11 NOTE — OUTREACH NOTE
Prep Survey      Flowsheet Row Responses   [facility] facility patient discharged from? [facility]   Is LACE score < 7 ? No   Eligibility Not Eligible   What are the reasons patient is not eligible? Hospice/Pallative Care  [[facility] consult]   Does the patient have one of the following disease processes/diagnoses(primary or secondary)? Other   Prep survey completed? Yes            Irene Brunner Registered Nurse

## 2023-06-12 ENCOUNTER — LAB REQUISITION (OUTPATIENT)
Dept: LAB | Facility: HOSPITAL | Age: 48
End: 2023-06-12
Payer: MEDICARE

## 2023-06-12 ENCOUNTER — HOME CARE VISIT (OUTPATIENT)
Dept: HOME HEALTH SERVICES | Facility: HOME HEALTHCARE | Age: 48
End: 2023-06-12
Payer: COMMERCIAL

## 2023-06-12 VITALS
RESPIRATION RATE: 18 BRPM | HEART RATE: 112 BPM | TEMPERATURE: 98.3 F | SYSTOLIC BLOOD PRESSURE: 142 MMHG | DIASTOLIC BLOOD PRESSURE: 62 MMHG | OXYGEN SATURATION: 99 %

## 2023-06-12 DIAGNOSIS — S06.5X0D TRAUMATIC SUBDURAL HEMORRHAGE WITHOUT LOSS OF CONSCIOUSNESS, SUBSEQUENT ENCOUNTER: ICD-10-CM

## 2023-06-12 LAB
ANISOCYTOSIS BLD QL: ABNORMAL
BACTERIA SPEC AEROBE CULT: NORMAL
BACTERIA SPEC AEROBE CULT: NORMAL
DEPRECATED RDW RBC AUTO: 49 FL (ref 37–54)
ERYTHROCYTE [DISTWIDTH] IN BLOOD BY AUTOMATED COUNT: 16.5 % (ref 12.3–15.4)
HCT VFR BLD AUTO: 25.4 % (ref 34–46.6)
HGB BLD-MCNC: 8.5 G/DL (ref 12–15.9)
LYMPHOCYTES # BLD MANUAL: 1.6 10*3/MM3 (ref 0.7–3.1)
LYMPHOCYTES NFR BLD MANUAL: 3 % (ref 5–12)
MCH RBC QN AUTO: 28.3 PG (ref 26.6–33)
MCHC RBC AUTO-ENTMCNC: 33.5 G/DL (ref 31.5–35.7)
MCV RBC AUTO: 84.6 FL (ref 79–97)
MONOCYTES # BLD: 0.06 10*3/MM3 (ref 0.1–0.9)
NEUTROPHILS # BLD AUTO: 0.44 10*3/MM3 (ref 1.7–7)
NEUTROPHILS NFR BLD MANUAL: 19 % (ref 42.7–76)
NEUTS BAND NFR BLD MANUAL: 2 % (ref 0–5)
PLAT MORPH BLD: NORMAL
PLATELET # BLD AUTO: 54 10*3/MM3 (ref 140–450)
PMV BLD AUTO: 7.1 FL (ref 6–12)
POIKILOCYTOSIS BLD QL SMEAR: ABNORMAL
RBC # BLD AUTO: 3 10*6/MM3 (ref 3.77–5.28)
SCAN SLIDE: NORMAL
VARIANT LYMPHS NFR BLD MANUAL: 76 % (ref 19.6–45.3)
WBC MORPH BLD: NORMAL
WBC NRBC COR # BLD: 2.1 10*3/MM3 (ref 3.4–10.8)

## 2023-06-12 PROCEDURE — G0299 HHS/HOSPICE OF RN EA 15 MIN: HCPCS

## 2023-06-12 PROCEDURE — 85025 COMPLETE CBC W/AUTO DIFF WBC: CPT | Performed by: INTERNAL MEDICINE

## 2023-06-12 NOTE — PROGRESS NOTES
Hematology/Oncology Outpatient Follow Up    PATIENT NAME:iNeves Mc  :1975  MRN: [id number]  PRIMARY CARE PHYSICIAN: Alida Latham APRN  REFERRING PHYSICIAN: Alida Latham, *    Chief Complaint   Patient presents with    Follow-up     CML (chronic myelocytic leukemia)        HISTORY OF PRESENT ILLNESS:      Patient is a 47 y.o. female with PMH significant for CML on treatment with Imatinib.  Patient stated that she typically feels poorly with Imatinib with frequent nausea and vomiting.  Also complained of weakness and fatigue.  Patient presented to ED on 10/22/18 with complaints of an elevated blood sugar around 400.  Stated she felt poorly and has had a productive cough with yellow sputum.  Complained of nausea and stated she was unable to keep any food down anytime she ate.  The patient reported subjective fever without chills.  She stated she had been coughing so hard that she was vomiting.  She denied hematemesis.  Denied diarrhea, constipation or blood in her stool.       Patient’s chest x-ray showed no evidence of acute pulmonary embolus.  The patient has ground-glass opacities throughout the lungs.  There is some air trapping noted which would appear to be   infectious.  Patient was started on antibiotics.      Hem/Onc was consulted on 10/23/18 for co-management of patient with CML.  Patient was seen by Dr. Lerma on 10/12 on previous admission for patient reported CML on Imatinib (Gleevec).  Patient reports she is under the care of Dr. Roach at [facility] in [place].  Patient was seen by Dr. Lerma in May 2018 when patient presented with hematuria, which was attributed to her Imatinib.  Dr. Lerma followed during hospitalization but then patient returned to Dr. Roach for her usual follow up.  She was again seen on 10/12.  Patient is stating she will switch to Dr. Lerma.    Review of Dr. Roach’s note:  On 18 patient had BCR-abl which was 61.326.  Patient had  been on Imatinib 400 mg daily.  She had presented in March 2018 with WBC of 24, hemoglobin 13.1, platelet count of 1,067,000.  Patient apparently had follow up BCR-abl in August 2018, results are not available for review.  Her bone marrow aspiration and biopsy was also performed at that time is currently not available for review.    11/6/18 - .  Uric acid 6.5.  BCR-abl by RT-PCR was measured at 3.36%.    Due to thrombocytosis, patient was placed on Hydroxyurea 500 mg twice a day on 12/11/18.  Platelet count juana to 900,000.  Patient was noncompliant with CBCs that were recommended.    Patient was asked to return to the office after not being able to reach her.   12/27/18 - Bone marrow aspiration and biopsy was consistent with chronic myeloid leukemia.  BCR-abl positive, chronic phase.  ABL kinase mutational analysis is currently pending on the bone marrow.    1/3/19 - Repeat CBC, WBC 17, hemoglobin 11.9, platelet count 1,072,000.  Hydroxyurea was increased to 1 gm twice a day with follow up CBC.    1/6/19 - Follow up CBC showed progressive increase in platelet to 1.1 million.  Patient was offered admission to the hospital, which she declined.  Hydroxyurea was increased to 1 gm three   times a day as of 1/6/19.   1/7/19 - EKG shows sinus tachycardia.   milliseconds.    ABL kinase on peripheral blood was ordered on 1/11/19.  Based on sequence analysis, there was no mutation detected.    2/12/19 - Patient was initiated on Tasigna 300 mg twice a day.  2/13/19 - Urinalysis was negative for hematuria.   2/22/19 -  milliseconds.  3/13/19 - EKG with QTC is 413 milliseconds.  Nonspecific changes noted.    4/18/19 - EKG showed QTC prolonged to 453 milliseconds.  This is changed from prior.  4/18/19 - Free T4 normal at 0.91.  Magnesium was 1.7.  BUN is 6.  Creatinine 0.7.  Bilirubin 2.2.  Phosphorous normal 3.7.  TSH 0.43, normal.  Free T3 was normal at 3.47.  Ionized calcium   normal at 1.23.  BCR-abl was  0.522%.    5/7/19 - EKG showed QTC of 441 milliseconds.  QT was 366.     Patient has been lost to follow-up since 2019 for CML.  Patient states that she lost her insurance.  She also does not have any primary care physician at this time.  She is diabetic on insulin.  Patient was recently admitted to the hospital for pneumonia due to COVID-19 infection.  At that time patient made a decision to become compliant with treatment.  She is currently on to Cigna 300 mg p.o. twice a day.  She is also on hydroxyurea 1 g twice a day.  Overall she feels better, she has more energy.  3/1/2022 white count is 53.92, hemoglobin 11.7 and platelets are 472    6/1/2022: Patient has not been seen since March 2022.  Also verified that she has not been taking to Tasigna since February 2022.  She comes in today with cough for 1 and half months, shortness of breath, denies fevers.  6/30/2022 patient had 2D echocardiogram which showed an EF of 41 to 45%.  Left ventricular cavity is mildly dilated.  She was diagnosed with nonischemic cardiomyopathy    11/18/2022: Patient was transferred from [facility] to [facility] due to cardiac failure.  She was then seen by NP [facility] hematology department.  Patient underwent tumor aspiration and biopsy at that time did not show chronic myeloid leukemia in accelerated phase markedly hypercellular marrow with megakaryocytic and myeloid hyperplasia with atypia and increased basophils blasts are not increased less than 1% moderate reticulin fibrosis.  According to the patient hydroxyurea was discontinued she was prescribed Gleevec 600 mg daily but she has only been taking 400 mg as she cannot find other milligram tablets.  She is already been pump inserted into her pelvic area.  She continues to experience nausea which resulted in her not taking the baclofen she should.  1/12/2023: WBC 17.64, hemoglobin 10.2, MCV 88.8, platelets 458,000.  On Gleevec 600 mg daily and hydroxyurea  500 mg twice daily.  2023: WBC 10.67, hemoglobin 10.0, MCV 86.4, platelets 370,000.  Patient on Gleevec 600 mg daily and hydroxyurea 500 mg once a day.  Patient instructed at the visit to discontinue her hydroxyurea.  2023: WBC 17.75, hemoglobin 10.4, MCV 87.2, platelets 425,000.  On Gleevec 600 mg daily.  3/10/2023: 2D echocardiogram showing EF of 44% EKG showing sinus tachycardia QTc of 491    Past Medical History:   Diagnosis Date    Bone pain     COVID-19 virus infection 2022    Diabetes mellitus     Extremity pain     Carlin. legs pain    Leg pain     left leg greater    Migraine     Pulmonary embolism     Vision loss     doing surgery       Past Surgical History:   Procedure Laterality Date    BONE MARROW BIOPSY      BREAST SURGERY      BRONCHOSCOPY N/A 2022    Procedure: BRONCHOSCOPY bilateral lung washing;  Surgeon: Charlene Camara MD;  Location: [facility] ENDOSCOPY;  Service: Pulmonary;  Laterality: N/A;  post: rule out infection vs transfusion lung injury     SECTION      CHOLECYSTECTOMY      EYE SURGERY      laser surgery due  to hemmorage--- 2021-- another surgery  lt eye11/15/21    RETINAL DETACHMENT SURGERY      SPINE SURGERY      Lombardi spinal block    TUBAL ABDOMINAL LIGATION           Current Outpatient Medications:     ALPRAZolam (Xanax) 0.5 MG tablet, Take 1 tablet by mouth At Night As Needed for Anxiety. Indications: Feeling Anxious, Disp: 30 tablet, Rfl: 0    acetaminophen (TYLENOL) 325 MG tablet, Take 2 tablets by mouth Every 4 (Four) Hours As Needed. Indications: Fever, Pain, Disp: , Rfl:     allopurinol (ZYLOPRIM) 100 MG tablet, Take 5 tablets by mouth Daily. Indications: Disorder of Excessive Uric Acid in the Blood, Disp: , Rfl:     budesonide-formoterol (SYMBICORT) 160-4.5 MCG/ACT inhaler, Inhale 2 puffs 2 (Two) Times a Day., Disp: 10.2 g, Rfl: 1    citalopram (CeleXA) 10 MG tablet, Take 1 tablet by mouth Daily. Indications: Generalized Anxiety Disorder, Disp: ,  Rfl:     Continuous Blood Gluc Sensor (FreeStyle Zahira 2 Sensor) misc, , Disp: , Rfl:     dapagliflozin Propanediol (Farxiga) 10 MG tablet, Take 10 mg by mouth Daily., Disp: 90 tablet, Rfl: 3    furosemide (LASIX) 40 MG tablet, Take 1 tablet by mouth Daily. Indications: Edema, Disp: , Rfl:     gabapentin (NEURONTIN) 300 MG capsule, Take 1 capsule by mouth 3 (Three) Times a Day. Indications: Diabetes with Nerve Disease, Disp: , Rfl:     hydrOXYzine (ATARAX) 25 MG tablet, Take 1 tablet by mouth Every 8 (Eight) Hours As Needed. Indications: Feeling Anxious, Disp: , Rfl:     Insulin Glargine (BASAGLAR KWIKPEN) 100 UNIT/ML injection pen, Inject 20 Units under the skin into the appropriate area as directed Daily., Disp: 10 mL, Rfl: 3    levoFLOXacin (Levaquin) 500 MG tablet, Take 1 tablet by mouth Daily for 4 days., Disp: 4 tablet, Rfl: 0    metoprolol succinate XL (TOPROL-XL) 25 MG 24 hr tablet, Take 1 tablet by mouth Daily., Disp: 90 tablet, Rfl: 3    midodrine (PROAMATINE) 10 MG tablet, Take 1 tablet by mouth Every 8 (Eight) Hours., Disp: 90 tablet, Rfl: 0    mirtazapine (REMERON) 15 MG tablet, Take 1 tablet by mouth every night at bedtime., Disp: 30 tablet, Rfl: 2    ondansetron ODT (ZOFRAN-ODT) 8 MG disintegrating tablet, Place 1 tablet on the tongue Every 8 (Eight) Hours As Needed for Nausea or Vomiting., Disp: 20 tablet, Rfl: 2    oxyCODONE (ROXICODONE) 10 MG tablet, Take 1 tablet by mouth Every 6 (Six) Hours As Needed for Moderate Pain., Disp: 40 tablet, Rfl: 0    pain patient supplied pump, 0.375 mL/hr by Intrathecal route Daily. Active pump Prescriber: Dr. Shelton - pain management  Med name/ concentration: Dilaudid  Last refill: yesterday  Lockout period: every 4 hours   Indications: chronic pain, Disp: , Rfl:     prochlorperazine (COMPAZINE) 10 MG tablet, Take 1 tablet by mouth Every 6 (Six) Hours As Needed for Nausea or Vomiting., Disp: 30 tablet, Rfl: 1    promethazine (PHENERGAN) 25 MG tablet, Take 1 tablet  by mouth As Needed. Indications: Nausea and Vomiting, Disp: , Rfl:     tiZANidine (ZANAFLEX) 4 MG tablet, Take 1 tablet by mouth Daily. Indications: Musculoskeletal Pain, Disp: , Rfl:     traZODone (DESYREL) 50 MG tablet, Take 25 mg by mouth every night at bedtime. Indications: Trouble Sleeping, Disp: , Rfl:   No current facility-administered medications for this visit.    Facility-Administered Medications Ordered in Other Visits:     acetaminophen (TYLENOL) tablet 650 mg, 650 mg, Oral, Once, Denisha Gill APRN    No Known Allergies    Family History   Problem Relation Age of Onset    Diabetes Mother     Diabetes Maternal Grandmother     Heart attack Maternal Grandmother     Stroke Maternal Grandmother        Cancer-related family history is not on file.    Social History     Tobacco Use    Smoking status: Never    Smokeless tobacco: Never   Vaping Use    Vaping Use: Never used   Substance Use Topics    Alcohol use: No    Drug use: No     I have reviewed and confirmed the accuracy of the patient's history: Chief complaint, HPI, ROS, and Subjective as entered by the MA/LPN/RN. Meghann Leeann Lerma MD 06/14/23      SUBJECTIVE:    Pain management is better on 10 mg of oxycodone every 4 hours.  Patient to follow-up with her pain management physician in [place] in July.  She does not need her medication adjusted      REVIEW OF SYSTEMS:    Review of Systems   Constitutional: Negative for chills and fever.   HENT: Negative for ear pain, mouth sores, nosebleeds and sore throat.    Eyes: Negative for photophobia and visual disturbance.   Respiratory: Negative for wheezing and stridor.    Cardiovascular: Negative for chest pain and palpitations.   Gastrointestinal: Negative for abdominal pain, diarrhea, nausea and vomiting.   Endocrine: Negative for cold intolerance and heat intolerance.   Genitourinary: Negative for dysuria and hematuria.   Musculoskeletal: Negative for joint swelling and neck stiffness.   "Positive for back and leg pain.   Skin: Negative for color change and rash.   Neurological: Negative for seizures and syncope.   Hematological: Negative for adenopathy.        No obvious bleeding   Psychiatric/Behavioral: Negative for agitation, confusion and hallucinations. Positive for insomnia, anxiety, depression.      OBJECTIVE:    Vitals:    06/14/23 1015   BP: 101/69   Pulse: 120   Resp: 18   Temp: 98 °F (36.7 °C)   TempSrc: Infrared   SpO2: 98%   Weight: 58.5 kg (129 lb)   Height: 162.6 cm (64\")   PainSc: 10-Worst pain ever   PainLoc: Leg     Body mass index is 22.14 kg/m².    ECOG  (1) Restricted in physically strenuous activity, ambulatory and able to do work of light nature    Physical Exam  Vitals and nursing note reviewed.   Constitutional:       General: She is not in acute distress.     Appearance: She is not diaphoretic.   HENT:      Head: Normocephalic and atraumatic.   Eyes:      General: No scleral icterus.        Right eye: No discharge.         Left eye: No discharge.      Conjunctiva/sclera: Conjunctivae normal.   Neck:      Thyroid: No thyromegaly.   Cardiovascular:      Rate and Rhythm: Normal rate and regular rhythm.      Heart sounds: Normal heart sounds. No friction rub. No gallop.    Pulmonary:      Effort: Pulmonary effort is normal. No respiratory distress.      Breath sounds: No stridor. No wheezing.   Abdominal:      General: Bowel sounds are normal.      Palpations: Abdomen is soft. There is no mass.      Tenderness: There is no abdominal tenderness. There is no guarding or rebound.   Musculoskeletal:         General: No tenderness. Normal range of motion.      Cervical back: Normal range of motion and neck supple.   Lymphadenopathy:      Cervical: No cervical adenopathy.   Skin:     General: Skin is warm.      Findings: No erythema or rash.   Neurological:      Mental Status: She is alert and oriented to person, place, and time.      Motor: No abnormal muscle tone.   Psychiatric:    "      Behavior: Behavior anxious and tearful    I have reexamined the patient and the results are consistent with the previously documented exam. Meghann Lerma MD        RECENT LABS    WBC   Date Value Ref Range Status   06/14/2023 1.90 (L) 3.40 - 10.80 10*3/mm3 Final     RBC   Date Value Ref Range Status   06/14/2023 2.70 (L) 3.77 - 5.28 10*6/mm3 Final     Hemoglobin   Date Value Ref Range Status   06/14/2023 7.9 (C) 12.0 - 15.9 g/dL Final     Hematocrit   Date Value Ref Range Status   06/14/2023 24.2 (L) 34.0 - 46.6 % Final     MCV   Date Value Ref Range Status   06/14/2023 89.6 79.0 - 97.0 fL Final     MCH   Date Value Ref Range Status   06/14/2023 29.3 26.6 - 33.0 pg Final     MCHC   Date Value Ref Range Status   06/14/2023 32.6 31.5 - 35.7 g/dL Final     RDW   Date Value Ref Range Status   06/14/2023 16.0 (H) 12.3 - 15.4 % Final     RDW-SD   Date Value Ref Range Status   06/14/2023 48.8 37.0 - 54.0 fl Final     MPV   Date Value Ref Range Status   06/14/2023 9.6 6.0 - 12.0 fL Final     Platelets   Date Value Ref Range Status   06/14/2023 60 (L) 140 - 450 10*3/mm3 Final     Neutrophil %   Date Value Ref Range Status   06/14/2023 20.0 (L) 42.7 - 76.0 % Final     Lymphocyte %   Date Value Ref Range Status   06/14/2023 74.7 (H) 19.6 - 45.3 % Final     Monocyte %   Date Value Ref Range Status   06/14/2023 4.2 (L) 5.0 - 12.0 % Final     Eosinophil %   Date Value Ref Range Status   06/14/2023 0.0 (L) 0.3 - 6.2 % Final     Basophil %   Date Value Ref Range Status   06/14/2023 1.1 0.0 - 1.5 % Final     External Neutrophils Abs   Date Value Ref Range Status   11/30/2022 7.6 (H) 1.7 - 6.0 x10(3)/ul Final     Neutrophils, Absolute   Date Value Ref Range Status   06/14/2023 0.38 (C) 1.70 - 7.00 10*3/mm3 Final     Lymphocytes, Absolute   Date Value Ref Range Status   06/14/2023 1.42 0.70 - 3.10 10*3/mm3 Final     Monocytes, Absolute   Date Value Ref Range Status   06/14/2023 0.08 (L) 0.10 - 0.90 10*3/mm3 Final      Eosinophils, Absolute   Date Value Ref Range Status   06/14/2023 0.00 0.00 - 0.40 10*3/mm3 Final     Basophils, Absolute   Date Value Ref Range Status   06/14/2023 0.02 0.00 - 0.20 10*3/mm3 Final     nRBC   Date Value Ref Range Status   06/07/2023 0.7 (H) 0.0 - 0.2 /100 WBC Final       Lab Results   Component Value Date    GLUCOSE 166 (H) 06/14/2023    BUN 24 (H) 06/14/2023    CREATININE 0.58 06/14/2023    EGFRIFNONA 133 02/02/2022    BCR 41.4 (H) 06/14/2023    K 4.6 06/14/2023    CO2 28.0 06/14/2023    CALCIUM 10.0 06/14/2023    ALBUMIN 3.7 06/14/2023    LABIL2 1.0 04/18/2019    AST 46 (H) 06/14/2023    ALT 58 (H) 06/14/2023           ASSESSMENT    Accelerated phase CML, status post bone marrow biopsy on 11/3/2022.  Patient was on ponatinib but developed significant pancytopenia and therefore treatment has been held.  We will continue to hold due to persistent thrombocytopenia today at 19,000.    Pain management issues, patient initially refusing to follow-up with her physician at [facility].  She is now willing to follow-up with her pain management physician.  Continue oxycodone 10 mg every 4 hours as needed  BCR ABL kinase mutation assay was negative  Cardiomyopathy her most recent echo shows an EF of 44% which is an improvement from 26 to 30%.  She will continue to follow-up with her cardiologist  Anxiety: Refill angiolytics today  Pancytopenia secondary to med, CML not requiring blood transfusion now.  For now continue to observe she will remain off TKI's  CML in chronic phase with no significant hematologic or molecular or cytogenetic response on   Imatinib.  ABL kinase mutational analysis was negative.  We  have represcribed to Tasigna 300 mg twice a day.  Patient has been noncompliant with treatments and ended up in the hospital with hyperleukocytosis, peripheral blood blasts.  Bone marrow biopsy suggest that she remains in chronic phase.  Continue to Tasiigna at the current doses.   Hydroxyurea has been discontinued.  Uncontrolled diabetes type 1, followed at [facility] by Dr. Calles  Chronic anemia: Stable, multifactorial from CML, to Tasigna, hydroxyurea.  This has improved  Insomnia  Situational anxiety, not suicidal.  Continue Xanax 0.5 mg once daily.  Hyperleukocytosis secondary to CML  History of medical noncompliance  Pulmonary embolism: Xarelto restarted  Recent COVID-19 pneumonia  History of prolonged QTC        Plans    CBC reviewed  Would request for HLA matched platelet transfusion tomorrow  Continue 3 times a week home health lab draws  Continue current pain management  Continue to hold ponatinib.  We will consider restarting only when counts recover with significant dose adjustment as I believe this did help.  Her splenomegaly has significantly improved  Oxycodone 10 mg every 4 hours as needed  Referral to supportive oncology due to worsening anxiety and depression, socioeconomic issues  Discontinue hydroxyurea due to normal platelets  Discontinued Gleevac  BCR ABL kinase mutational analysis was negative  Request additional records from [facility]  DC trazodone 25 mg due to QT prolongation  Monitor electrolytes closely  Continue to follow-up with cardiology in regards to dosing of her diuretics.  EKG reviewed.  She has slight prolongation of QT.  We will discontinue Zofran and Phenergan and use Compazine as needed for nausea.  Prescription has been sent to her pharmacy  GI consult for continued nausea and vomiting and reported dark emesis.  She had EGD and colonoscopy coming up but they are requesting $400 upfront which she does not have.  I have asked  to investigate if there is an affordable option.  Due to worsening anxiety increase Xanax to 0.5 mg p.o. nightly as needed number 30 pills.  Continue this for now.  Refill today.  All questions answered  Continue to follow-up with PCP per routine  Follow-up with pain  management per routine  Monitor EKG-normal QT interval from EKG in April 2023  Follow-up 2 weeks      I spent 40 total minutes, face-to-face, caring for Hope today. 90% of this time involved counseling and/or coordination of care as documented within this note.

## 2023-06-12 NOTE — CASE MANAGEMENT/SOCIAL WORK
Case Management Discharge Note      Final Note: Home    Provided Post Acute Provider List?: N/A    Selected Continued Care - Discharged on 6/10/2023 Admission date: 6/7/2023 - Discharge disposition: Home or Self Care          Home Medical Care       Service Provider Selected Services Address Phone Fax Patient Preferred    [facility] [address] [facility] [place] [address] [phone] --                               Transportation Services  Private: Car    Final Discharge Disposition Code: 01 - home or self-care

## 2023-06-13 ENCOUNTER — HOME CARE VISIT (OUTPATIENT)
Dept: HOME HEALTH SERVICES | Facility: HOME HEALTHCARE | Age: 48
End: 2023-06-13
Payer: COMMERCIAL

## 2023-06-13 NOTE — HOME HEALTH
Resumption of Care Note: Pt was admitted to [facility] on 6.7.23 with regards to requiring a blood tranfusion. patient wbc 1.2, hgb 6.6 and plt 22. Patient received 2 units of blood. patient was started on oral antibiotics for profalactic reasons.     Reason for hospitalization/new problems: chronic myelogenous leukemia    [facility] Findings: pt is weak and is only able to walk a distance of less then 10 feet.     New/changed medications: levaquin 500 mg po for 4 doses    New/changed orders: same    Plan/Focus of Care and Skilled need: chronic myelogenous leukemia     Plan for next visit:labs, cardiopulmonary assessment, med education, pain assessment, fall prevention education and labs per md order

## 2023-06-14 ENCOUNTER — OFFICE VISIT (OUTPATIENT)
Dept: ONCOLOGY | Facility: CLINIC | Age: 48
End: 2023-06-14
Payer: MEDICARE

## 2023-06-14 ENCOUNTER — LAB (OUTPATIENT)
Dept: LAB | Facility: HOSPITAL | Age: 48
End: 2023-06-14
Payer: MEDICARE

## 2023-06-14 VITALS
HEIGHT: 64 IN | TEMPERATURE: 98 F | OXYGEN SATURATION: 98 % | BODY MASS INDEX: 22.02 KG/M2 | SYSTOLIC BLOOD PRESSURE: 101 MMHG | DIASTOLIC BLOOD PRESSURE: 69 MMHG | RESPIRATION RATE: 18 BRPM | WEIGHT: 129 LBS | HEART RATE: 120 BPM

## 2023-06-14 DIAGNOSIS — C92.10 CML (CHRONIC MYELOCYTIC LEUKEMIA): ICD-10-CM

## 2023-06-14 DIAGNOSIS — D69.6 THROMBOCYTOPENIA: ICD-10-CM

## 2023-06-14 DIAGNOSIS — C92.10 CML (CHRONIC MYELOCYTIC LEUKEMIA): Primary | ICD-10-CM

## 2023-06-14 DIAGNOSIS — F41.9 ANXIETY: ICD-10-CM

## 2023-06-14 DIAGNOSIS — D69.6 THROMBOCYTOPENIA: Primary | ICD-10-CM

## 2023-06-14 LAB
ALBUMIN SERPL-MCNC: 3.7 G/DL (ref 3.5–5.2)
ALBUMIN/GLOB SERPL: 1 G/DL
ALP SERPL-CCNC: 824 U/L (ref 39–117)
ALT SERPL W P-5'-P-CCNC: 58 U/L (ref 1–33)
ANION GAP SERPL CALCULATED.3IONS-SCNC: 10 MMOL/L (ref 5–15)
AST SERPL-CCNC: 46 U/L (ref 1–32)
BASOPHILS # BLD AUTO: 0.02 10*3/MM3 (ref 0–0.2)
BASOPHILS NFR BLD AUTO: 1.1 % (ref 0–1.5)
BILIRUB SERPL-MCNC: 0.8 MG/DL (ref 0–1.2)
BUN SERPL-MCNC: 24 MG/DL (ref 6–20)
BUN/CREAT SERPL: 41.4 (ref 7–25)
CALCIUM SPEC-SCNC: 10 MG/DL (ref 8.6–10.5)
CHLORIDE SERPL-SCNC: 101 MMOL/L (ref 98–107)
CO2 SERPL-SCNC: 28 MMOL/L (ref 22–29)
CREAT SERPL-MCNC: 0.58 MG/DL (ref 0.57–1)
DEPRECATED RDW RBC AUTO: 48.8 FL (ref 37–54)
EGFRCR SERPLBLD CKD-EPI 2021: 112.5 ML/MIN/1.73
EOSINOPHIL # BLD AUTO: 0 10*3/MM3 (ref 0–0.4)
EOSINOPHIL NFR BLD AUTO: 0 % (ref 0.3–6.2)
ERYTHROCYTE [DISTWIDTH] IN BLOOD BY AUTOMATED COUNT: 16 % (ref 12.3–15.4)
GLOBULIN UR ELPH-MCNC: 3.8 GM/DL
GLUCOSE SERPL-MCNC: 166 MG/DL (ref 65–99)
HCT VFR BLD AUTO: 24.2 % (ref 34–46.6)
HGB BLD-MCNC: 7.9 G/DL (ref 12–15.9)
HOLD SPECIMEN: NORMAL
HOLD SPECIMEN: NORMAL
LYMPHOCYTES # BLD AUTO: 1.42 10*3/MM3 (ref 0.7–3.1)
LYMPHOCYTES NFR BLD AUTO: 74.7 % (ref 19.6–45.3)
MCH RBC QN AUTO: 29.3 PG (ref 26.6–33)
MCHC RBC AUTO-ENTMCNC: 32.6 G/DL (ref 31.5–35.7)
MCV RBC AUTO: 89.6 FL (ref 79–97)
MONOCYTES # BLD AUTO: 0.08 10*3/MM3 (ref 0.1–0.9)
MONOCYTES NFR BLD AUTO: 4.2 % (ref 5–12)
NEUTROPHILS NFR BLD AUTO: 0.38 10*3/MM3 (ref 1.7–7)
NEUTROPHILS NFR BLD AUTO: 20 % (ref 42.7–76)
PLATELET # BLD AUTO: 60 10*3/MM3 (ref 140–450)
PMV BLD AUTO: 9.6 FL (ref 6–12)
POTASSIUM SERPL-SCNC: 4.6 MMOL/L (ref 3.5–5.2)
PROT SERPL-MCNC: 7.5 G/DL (ref 6–8.5)
RBC # BLD AUTO: 2.7 10*6/MM3 (ref 3.77–5.28)
SODIUM SERPL-SCNC: 139 MMOL/L (ref 136–145)
WBC NRBC COR # BLD: 1.9 10*3/MM3 (ref 3.4–10.8)

## 2023-06-14 PROCEDURE — 36415 COLL VENOUS BLD VENIPUNCTURE: CPT

## 2023-06-14 PROCEDURE — 80053 COMPREHEN METABOLIC PANEL: CPT | Performed by: INTERNAL MEDICINE

## 2023-06-14 PROCEDURE — 85025 COMPLETE CBC W/AUTO DIFF WBC: CPT

## 2023-06-14 RX ORDER — ALPRAZOLAM 0.5 MG/1
0.5 TABLET ORAL NIGHTLY PRN
Qty: 30 TABLET | Refills: 0 | Status: SHIPPED | OUTPATIENT
Start: 2023-06-14

## 2023-06-15 ENCOUNTER — SPECIALTY PHARMACY (OUTPATIENT)
Dept: PHARMACY | Facility: HOSPITAL | Age: 48
End: 2023-06-15
Payer: MEDICARE

## 2023-06-15 NOTE — PROGRESS NOTES
Specialty Pharmacy Note: Iclusig (ponatinib)    Nieves Mc is a 47 y.o. female with CML was seen yesterday by Dr. Lerma. Per provider dictation,continue to hold orall oncology regimen Iclusig (ponatinib) with possible restart at reduced dose when CBC improves.    Specialty pharmacy will continue to follow patient.    Ros CALHOUN, PharmD    6/15/2023  11:47 EDT

## 2023-06-16 ENCOUNTER — HOME CARE VISIT (OUTPATIENT)
Dept: HOME HEALTH SERVICES | Facility: HOME HEALTHCARE | Age: 48
End: 2023-06-16
Payer: COMMERCIAL

## 2023-06-16 ENCOUNTER — TELEPHONE (OUTPATIENT)
Dept: ONCOLOGY | Facility: CLINIC | Age: 48
End: 2023-06-16
Payer: MEDICARE

## 2023-06-16 ENCOUNTER — LAB REQUISITION (OUTPATIENT)
Dept: LAB | Facility: HOSPITAL | Age: 48
End: 2023-06-16
Payer: MEDICARE

## 2023-06-16 VITALS
SYSTOLIC BLOOD PRESSURE: 122 MMHG | OXYGEN SATURATION: 97 % | TEMPERATURE: 98.2 F | RESPIRATION RATE: 18 BRPM | DIASTOLIC BLOOD PRESSURE: 56 MMHG | HEART RATE: 99 BPM

## 2023-06-16 DIAGNOSIS — D64.9 ANEMIA, UNSPECIFIED TYPE: Primary | ICD-10-CM

## 2023-06-16 DIAGNOSIS — C92.10 CML (CHRONIC MYELOCYTIC LEUKEMIA): Chronic | ICD-10-CM

## 2023-06-16 DIAGNOSIS — C92.10 CHRONIC MYELOID LEUKEMIA, BCR/ABL-POSITIVE, NOT HAVING ACHIEVED REMISSION: ICD-10-CM

## 2023-06-16 DIAGNOSIS — C95.90 LEUKEMIA NOT HAVING ACHIEVED REMISSION, UNSPECIFIED LEUKEMIA TYPE: Primary | ICD-10-CM

## 2023-06-16 LAB
ANISOCYTOSIS BLD QL: ABNORMAL
BASOPHILS # BLD MANUAL: 0.04 10*3/MM3 (ref 0–0.2)
BASOPHILS NFR BLD MANUAL: 2 % (ref 0–1.5)
DEPRECATED RDW RBC AUTO: 50.8 FL (ref 37–54)
ERYTHROCYTE [DISTWIDTH] IN BLOOD BY AUTOMATED COUNT: 16.9 % (ref 12.3–15.4)
HCT VFR BLD AUTO: 19.8 % (ref 34–46.6)
HGB BLD-MCNC: 6.7 G/DL (ref 12–15.9)
LYMPHOCYTES # BLD MANUAL: 1.03 10*3/MM3 (ref 0.7–3.1)
LYMPHOCYTES NFR BLD MANUAL: 1 % (ref 5–12)
MCH RBC QN AUTO: 29.2 PG (ref 26.6–33)
MCHC RBC AUTO-ENTMCNC: 33.6 G/DL (ref 31.5–35.7)
MCV RBC AUTO: 86.8 FL (ref 79–97)
METAMYELOCYTES NFR BLD MANUAL: 1 % (ref 0–0)
MONOCYTES # BLD: 0.02 10*3/MM3 (ref 0.1–0.9)
MYELOCYTES NFR BLD MANUAL: 3 % (ref 0–0)
NEUTROPHILS # BLD AUTO: 0.74 10*3/MM3 (ref 1.7–7)
NEUTROPHILS NFR BLD MANUAL: 33 % (ref 42.7–76)
NEUTS BAND NFR BLD MANUAL: 6 % (ref 0–5)
NRBC SPEC MANUAL: 1 /100 WBC (ref 0–0.2)
PLATELET # BLD AUTO: 66 10*3/MM3 (ref 140–450)
PMV BLD AUTO: 7 FL (ref 6–12)
RBC # BLD AUTO: 2.28 10*6/MM3 (ref 3.77–5.28)
SCAN SLIDE: NORMAL
SMALL PLATELETS BLD QL SMEAR: ABNORMAL
TOXIC GRANULATION: ABNORMAL
VARIANT LYMPHS NFR BLD MANUAL: 46 % (ref 19.6–45.3)
VARIANT LYMPHS NFR BLD MANUAL: 8 % (ref 0–5)
WBC NRBC COR # BLD: 1.9 10*3/MM3 (ref 3.4–10.8)

## 2023-06-16 PROCEDURE — 85025 COMPLETE CBC W/AUTO DIFF WBC: CPT | Performed by: INTERNAL MEDICINE

## 2023-06-16 PROCEDURE — G0299 HHS/HOSPICE OF RN EA 15 MIN: HCPCS

## 2023-06-16 NOTE — PROGRESS NOTES
[facility] Medicine Services   Daily Progress Note      Patient Name: Nieves Mc  : 1975  MRN: [id number]  Primary Care Physician:  Alida Latham APRN  Date of admission: 2023      Subjective      Chief Complaint: Weakness, abnormal labs    Patient seen and examined this morning.  Doing okay, pain appears to be controlled for now.  Awaiting morning CBC results.  No other complaints.    Pertinent positives as noted in HPI/subjective.  All other systems were reviewed and are negative.      Objective      Vitals:   Temp:  [98 °F (36.7 °C)-98.2 °F (36.8 °C)] 98 °F (36.7 °C)  Heart Rate:  [] 102  Resp:  [12-18] 12  BP: (124-136)/(70-81) 128/71    Physical Exam:    General: Awake, alert, chronically ill-appearing female, lying in bed, NAD  Cardiovascular: Regular rate and rhythm, no murmurs  Respiratory: Clear to auscultation bilaterally, no wheezing or rales, unlabored breathing  Abdomen: Soft, nontender, positive bowel sounds, no guarding  Musculoskeletal: Generalized weakness, no other gross deformities  Skin: Warm, dry         Result Review    Result Review:  I have personally reviewed the results from the time of this admission to 2023 10:09 EDT and agree with these findings:  [x]  Laboratory  [x]  Microbiology  [x]  Radiology  [x]  EKG/Telemetry   [x]  Cardiology/Vascular   []  Pathology  [x]  Old records  []  Other:          Assessment & Plan      Brief Patient Summary:  Nieves Mc is a 47 y.o. female with PMH of Accelerated phase CML followed outpatient by Dr. Lerma. Treatment has been on hold due to persistent thrombocytopenia. She had labs at the cancer center  that showed WBC 1.2, hgb 6.6 and plts 22. She complains of significant back pain and pain all over in her bones. She has a pain pump that has dilaudid infusing that she feels is not working properly. She has not recently followed up with [facility] pain management per Dr. Lerma's documentation. The patient was  directed to the ED for further evaluation.  Noted to have severe pancytopenia with neutropenia on admission.  Neutropenic precautions started, infectious work-up also ordered.  Empiric IV ceftazidime due to history of ESBL UTI.  Heme-onc also consulted on admission.      allopurinol, 500 mg, Oral, Daily  budesonide-formoterol, 2 puff, Inhalation, BID - RT  cefTAZidime, 2 g, Intravenous, Q8H  citalopram, 10 mg, Oral, Daily  empagliflozin, 10 mg, Oral, Daily  furosemide, 40 mg, Oral, Daily  gabapentin, 300 mg, Oral, TID  insulin glargine, 10 Units, Subcutaneous, Daily  insulin lispro, 2-9 Units, Subcutaneous, 4x Daily AC & at Bedtime  metoprolol succinate XL, 25 mg, Oral, Daily  mirtazapine, 15 mg, Oral, Nightly  pain, 0.375 mL/hr, Intrathecal, Daily  senna-docusate sodium, 2 tablet, Oral, BID  sodium chloride, 10 mL, Intravenous, Q12H  tiZANidine, 4 mg, Oral, Daily             I have utilized all available, immediate resources to obtain, update, or review the patient's current medications including all prescriptions, over-the-counter products, herbals, cannabis/cannabidiol products, and vitamin.mineral/dietary (nutritional) supplements.    Active Hospital Problems:  Active Hospital Problems    Diagnosis     **Pancytopenia     NICM (nonischemic cardiomyopathy)     Depression with anxiety     History of pulmonary embolism     CML (chronic myelocytic leukemia)      Plan:     Pancytopenia with neutropenia  CML  -Hemoglobin 6.1 and ANC of <500 on admission  -2 units transfusion ordered on 6/8  -maintain neutropenic precaution  -Blood cultures NGTD, UA appears bland, patient denies any urinary symptoms  -Has history of ESBL, continue empiric IV ceftazidime for now, de-escalate to p.o. Levaquin if patient remains neutropenic  -Monitor CBC daily  -Heme-onc following    Acute on chronic pain related to malignancy  -Patient has pain pump and follows with pain management at [facility]  -Continue patient applied pain  pump  -Oxycodone as needed for breakthrough pain  -Oncology managed pain doing previous admission, will defer pain management to them for now    NICM  Chronic HFrEF  -Previously underwent cardiac work-up, known EF of 44%  -Not in exacerbation currently  -Continue GDMT as tolerated    DM 2  -Continue basal insulin with sliding scale  -Blood glucose, adjust as needed    Chronic anxiety/depression  -Continue home meds, monitor    DVT prophylaxis  -SCDs only      CODE STATUS:    Level Of Support Discussed With: Patient  Code Status (Patient has no pulse and is not breathing): CPR (Attempt to Resuscitate)  Medical Interventions (Patient has pulse or is breathing): Full Support      Disposition: Discharge home once cleared by heme-onc.    Electronically signed by Martha Shields DO, 06/09/23, 10:09 EDT.  [facility] Hospitalist Team      Part of this note may be an electronic transcription/translation of spoken language to printed text using the Dragon Dictation System.     SSKI Counseling:  I discussed with the patient the risks of SSKI including but not limited to thyroid abnormalities, metallic taste, GI upset, fever, headache, acne, arthralgias, paraesthesias, lymphadenopathy, easy bleeding, arrhythmias, and allergic reaction.

## 2023-06-16 NOTE — TELEPHONE ENCOUNTER
Received message from Dr. Lerma at 1106 stating that pt needs blood today for hgb of 6.7. Called [facility] to see what times they had available. The only appt available was at 1230. Called pt's home number, no answer. Called pt's mother who stated that the pt was with her kids and she would try to reach her. Received call back from pt's daughter. I asked to speak to the pt, pt came to the phone and I explained that her hgb was low and that Dr. Lerma has advised that she receive blood today if possible. Pt stated that she could not do it today and asked if she could do it tomorrow. I told her I would check with [facility] to see if they could get her in tomorrow, but they may not be able to. Pt gave the phone to her daughter so I had to finish the conversation with her. I told the pt's daughter I would check with [facility]U to see if they can do it tomorrow, but if not, the pt will need to go to the ER. I told her I would call back in a few minutes. Pt's daughter verbalized understanding.     Called [facility] to see if they could give blood tomorrow. Appt available at 0800 unless she is able to have type and screen today and then she could go in at 0900. Attempted to call pt's daughter twice with no answer, VM not set up.     Received call back from pt's daughter. She stated that the pt would take the 0800 appt. [facility] notified.

## 2023-06-16 NOTE — HOME HEALTH
Routine Visit Note:    Skill/education provided: cardiopulmonary assessment, med education, pain assessment, fall prevention education, labs per md order     Patient/caregiver response: pt stated understanding    Plan for next visit:  cardiopulmonary assessment, med education, pain assessment, fall prevention education, labs per md order       Other pertinent info: monitor for falls or bleeding

## 2023-06-17 ENCOUNTER — HOSPITAL ENCOUNTER (OUTPATIENT)
Dept: INFUSION THERAPY | Facility: HOSPITAL | Age: 48
Discharge: HOME | End: 2023-06-17
Payer: MEDICARE

## 2023-06-17 VITALS
SYSTOLIC BLOOD PRESSURE: 153 MMHG | HEART RATE: 103 BPM | OXYGEN SATURATION: 99 % | DIASTOLIC BLOOD PRESSURE: 87 MMHG | TEMPERATURE: 98.2 F | RESPIRATION RATE: 16 BRPM

## 2023-06-17 DIAGNOSIS — D69.6 THROMBOCYTOPENIA: ICD-10-CM

## 2023-06-17 DIAGNOSIS — D64.9 ANEMIA, UNSPECIFIED TYPE: ICD-10-CM

## 2023-06-17 DIAGNOSIS — C95.90 LEUKEMIA NOT HAVING ACHIEVED REMISSION, UNSPECIFIED LEUKEMIA TYPE: ICD-10-CM

## 2023-06-17 DIAGNOSIS — D61.818 PANCYTOPENIA: Primary | ICD-10-CM

## 2023-06-17 DIAGNOSIS — C92.10 CML (CHRONIC MYELOCYTIC LEUKEMIA): ICD-10-CM

## 2023-06-17 LAB
ABO GROUP BLD: NORMAL
BASOPHILS # BLD AUTO: 0 10*3/MM3 (ref 0–0.2)
BASOPHILS NFR BLD AUTO: 1.5 % (ref 0–1.5)
BB HOLD TUBE: NORMAL
BLD GP AB SCN SERPL QL: NEGATIVE
DEPRECATED RDW RBC AUTO: 49.9 FL (ref 37–54)
EOSINOPHIL # BLD AUTO: 0 10*3/MM3 (ref 0–0.4)
EOSINOPHIL NFR BLD AUTO: 0.1 % (ref 0.3–6.2)
ERYTHROCYTE [DISTWIDTH] IN BLOOD BY AUTOMATED COUNT: 16.7 % (ref 12.3–15.4)
HCT VFR BLD AUTO: 21.4 % (ref 34–46.6)
HGB BLD-MCNC: 7 G/DL (ref 12–15.9)
LYMPHOCYTES # BLD AUTO: 0.9 10*3/MM3 (ref 0.7–3.1)
LYMPHOCYTES NFR BLD AUTO: 38.4 % (ref 19.6–45.3)
MCH RBC QN AUTO: 28.4 PG (ref 26.6–33)
MCHC RBC AUTO-ENTMCNC: 32.7 G/DL (ref 31.5–35.7)
MCV RBC AUTO: 86.7 FL (ref 79–97)
MONOCYTES # BLD AUTO: 0.1 10*3/MM3 (ref 0.1–0.9)
MONOCYTES NFR BLD AUTO: 6 % (ref 5–12)
NEUTROPHILS NFR BLD AUTO: 1.2 10*3/MM3 (ref 1.7–7)
NEUTROPHILS NFR BLD AUTO: 54 % (ref 42.7–76)
NRBC BLD AUTO-RTO: 0.7 /100 WBC (ref 0–0.2)
PLATELET # BLD AUTO: 64 10*3/MM3 (ref 140–450)
PMV BLD AUTO: 6.9 FL (ref 6–12)
RBC # BLD AUTO: 2.46 10*6/MM3 (ref 3.77–5.28)
RH BLD: POSITIVE
T&S EXPIRATION DATE: NORMAL
WBC NRBC COR # BLD: 2.3 10*3/MM3 (ref 3.4–10.8)

## 2023-06-17 PROCEDURE — 86922 COMPATIBILITY TEST ANTIGLOB: CPT

## 2023-06-17 PROCEDURE — P9040 RBC LEUKOREDUCED IRRADIATED: HCPCS

## 2023-06-17 PROCEDURE — 85025 COMPLETE CBC W/AUTO DIFF WBC: CPT | Performed by: INTERNAL MEDICINE

## 2023-06-17 PROCEDURE — 86901 BLOOD TYPING SEROLOGIC RH(D): CPT | Performed by: INTERNAL MEDICINE

## 2023-06-17 PROCEDURE — 36415 COLL VENOUS BLD VENIPUNCTURE: CPT

## 2023-06-17 PROCEDURE — 36430 TRANSFUSION BLD/BLD COMPNT: CPT

## 2023-06-17 PROCEDURE — 63710000001 DIPHENHYDRAMINE PER 50 MG: Performed by: INTERNAL MEDICINE

## 2023-06-17 PROCEDURE — 86900 BLOOD TYPING SEROLOGIC ABO: CPT

## 2023-06-17 PROCEDURE — 86900 BLOOD TYPING SEROLOGIC ABO: CPT | Performed by: INTERNAL MEDICINE

## 2023-06-17 PROCEDURE — 86850 RBC ANTIBODY SCREEN: CPT | Performed by: INTERNAL MEDICINE

## 2023-06-17 RX ORDER — SODIUM CHLORIDE 9 MG/ML
250 INJECTION, SOLUTION INTRAVENOUS AS NEEDED
Status: DISCONTINUED | OUTPATIENT
Start: 2023-06-17 | End: 2023-06-19 | Stop reason: HOSPADM

## 2023-06-17 RX ORDER — DIPHENHYDRAMINE HCL 25 MG
25 CAPSULE ORAL ONCE
Status: COMPLETED | OUTPATIENT
Start: 2023-06-17 | End: 2023-06-17

## 2023-06-17 RX ORDER — ACETAMINOPHEN 325 MG/1
650 TABLET ORAL ONCE
Status: COMPLETED | OUTPATIENT
Start: 2023-06-17 | End: 2023-06-17

## 2023-06-17 RX ADMIN — DIPHENHYDRAMINE HYDROCHLORIDE 25 MG: 25 CAPSULE ORAL at 10:48

## 2023-06-17 RX ADMIN — ACETAMINOPHEN 650 MG: 325 TABLET, FILM COATED ORAL at 10:48

## 2023-06-18 LAB
BH BB BLOOD EXPIRATION DATE: NORMAL
BH BB BLOOD TYPE BARCODE: 6200
BH BB DISPENSE STATUS: NORMAL
BH BB PRODUCT CODE: NORMAL
BH BB UNIT NUMBER: NORMAL
CROSSMATCH INTERPRETATION: NORMAL
UNIT  ABO: NORMAL
UNIT  RH: NORMAL

## 2023-06-19 ENCOUNTER — HOME CARE VISIT (OUTPATIENT)
Dept: HOME HEALTH SERVICES | Facility: HOME HEALTHCARE | Age: 48
End: 2023-06-19
Payer: COMMERCIAL

## 2023-06-19 ENCOUNTER — TELEPHONE (OUTPATIENT)
Dept: ONCOLOGY | Facility: CLINIC | Age: 48
End: 2023-06-19
Payer: MEDICAID

## 2023-06-19 VITALS
OXYGEN SATURATION: 97 % | RESPIRATION RATE: 18 BRPM | DIASTOLIC BLOOD PRESSURE: 62 MMHG | HEART RATE: 85 BPM | SYSTOLIC BLOOD PRESSURE: 126 MMHG | TEMPERATURE: 98.1 F

## 2023-06-19 DIAGNOSIS — C92.10 CML (CHRONIC MYELOCYTIC LEUKEMIA): ICD-10-CM

## 2023-06-19 DIAGNOSIS — R52 PAIN: ICD-10-CM

## 2023-06-19 PROCEDURE — G0299 HHS/HOSPICE OF RN EA 15 MIN: HCPCS

## 2023-06-19 RX ORDER — OXYCODONE HYDROCHLORIDE 10 MG/1
10 TABLET ORAL EVERY 6 HOURS PRN
Qty: 40 TABLET | Refills: 0 | Status: ON HOLD | OUTPATIENT
Start: 2023-06-19

## 2023-06-19 NOTE — TELEPHONE ENCOUNTER
Caller: LISANDRO    Relationship:     Best call back number: [phone]    What is the best time to reach you: ANY LEAVE VM    Who are you requesting to speak with (clinical staff, provider,  specific staff member): [facility]/HOSPICE REFERRAL        What was the call regarding: LISANDRO IS CALLING ABOUT A HOSPICE REFERRAL AND IS ASKING TO SPEAK TO [facility]. LISANDRO ALSO INDICATED THAT PATIENT [facility] HAS REFUSED HOSPICE CARE.    Is it okay if the provider responds through MyChart: NO

## 2023-06-19 NOTE — TELEPHONE ENCOUNTER
Returned Alda's call and she let me know that they called to get [facility] set up with [facility]osp[facility] care for pain management and [facility] has refused their services.  I let her know that I would let the doctor know.

## 2023-06-24 PROBLEM — Z86.69 HISTORY OF MIGRAINE: Chronic | Status: ACTIVE | Noted: 2023-06-24

## 2023-06-24 PROBLEM — E83.42 HYPOMAGNESEMIA: Status: ACTIVE | Noted: 2023-06-24

## 2023-06-24 PROBLEM — R10.11 RIGHT UPPER QUADRANT ABDOMINAL PAIN: Status: ACTIVE | Noted: 2023-06-24

## 2023-06-25 ENCOUNTER — INPATIENT HOSPITAL (OUTPATIENT)
Dept: URBAN - METROPOLITAN AREA HOSPITAL 84 | Facility: HOSPITAL | Age: 48
End: 2023-06-25

## 2023-06-25 DIAGNOSIS — D64.9 ANEMIA, UNSPECIFIED: ICD-10-CM

## 2023-06-25 DIAGNOSIS — R94.5 ABNORMAL RESULTS OF LIVER FUNCTION STUDIES: ICD-10-CM

## 2023-06-25 DIAGNOSIS — R10.13 EPIGASTRIC PAIN: ICD-10-CM

## 2023-06-25 DIAGNOSIS — Z79.891 LONG TERM (CURRENT) USE OF OPIATE ANALGESIC: ICD-10-CM

## 2023-06-25 DIAGNOSIS — G89.4 CHRONIC PAIN SYNDROME: ICD-10-CM

## 2023-06-25 PROBLEM — S32.592A PUBIC RAMUS FRACTURE, LEFT, CLOSED, INITIAL ENCOUNTER: Status: ACTIVE | Noted: 2023-06-25
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
## 2023-06-25 PROCEDURE — 99222 1ST HOSP IP/OBS MODERATE 55: CPT | Performed by: NURSE PRACTITIONER

## 2023-06-27 ENCOUNTER — INPATIENT HOSPITAL (OUTPATIENT)
Dept: URBAN - METROPOLITAN AREA HOSPITAL 84 | Facility: HOSPITAL | Age: 48
End: 2023-06-27

## 2023-06-27 DIAGNOSIS — R74.01 ELEVATION OF LEVELS OF LIVER TRANSAMINASE LEVELS: ICD-10-CM

## 2023-06-27 DIAGNOSIS — R10.11 RIGHT UPPER QUADRANT PAIN: ICD-10-CM

## 2023-06-27 DIAGNOSIS — R11.0 NAUSEA: ICD-10-CM

## 2023-06-27 DIAGNOSIS — D64.9 ANEMIA, UNSPECIFIED: ICD-10-CM

## 2023-06-27 PROCEDURE — 99233 SBSQ HOSP IP/OBS HIGH 50: CPT | Performed by: NURSE PRACTITIONER

## 2023-06-28 ENCOUNTER — INPATIENT HOSPITAL (OUTPATIENT)
Dept: URBAN - METROPOLITAN AREA HOSPITAL 84 | Facility: HOSPITAL | Age: 48
End: 2023-06-28

## 2023-06-28 DIAGNOSIS — D64.9 ANEMIA, UNSPECIFIED: ICD-10-CM

## 2023-06-28 DIAGNOSIS — R74.01 ELEVATION OF LEVELS OF LIVER TRANSAMINASE LEVELS: ICD-10-CM

## 2023-06-28 DIAGNOSIS — R10.11 RIGHT UPPER QUADRANT PAIN: ICD-10-CM

## 2023-06-28 PROBLEM — R11.0 NAUSEA: Status: ACTIVE | Noted: 2023-06-24

## 2023-06-28 PROCEDURE — 99233 SBSQ HOSP IP/OBS HIGH 50: CPT | Performed by: NURSE PRACTITIONER

## 2023-06-29 ENCOUNTER — INPATIENT HOSPITAL (OUTPATIENT)
Dept: URBAN - METROPOLITAN AREA HOSPITAL 84 | Facility: HOSPITAL | Age: 48
End: 2023-06-29
Payer: COMMERCIAL

## 2023-06-29 DIAGNOSIS — K29.70 GASTRITIS, UNSPECIFIED, WITHOUT BLEEDING: ICD-10-CM

## 2023-06-29 DIAGNOSIS — D64.9 ANEMIA, UNSPECIFIED: ICD-10-CM

## 2023-06-29 DIAGNOSIS — K29.80 DUODENITIS WITHOUT BLEEDING: ICD-10-CM

## 2023-06-29 DIAGNOSIS — R11.0 NAUSEA: ICD-10-CM

## 2023-06-29 PROCEDURE — 43239 EGD BIOPSY SINGLE/MULTIPLE: CPT | Performed by: INTERNAL MEDICINE

## 2023-06-29 PROCEDURE — 45378 DIAGNOSTIC COLONOSCOPY: CPT | Performed by: INTERNAL MEDICINE

## 2023-06-30 ENCOUNTER — INPATIENT HOSPITAL (OUTPATIENT)
Dept: URBAN - METROPOLITAN AREA HOSPITAL 84 | Facility: HOSPITAL | Age: 48
End: 2023-06-30
Payer: COMMERCIAL

## 2023-06-30 DIAGNOSIS — G89.29 OTHER CHRONIC PAIN: ICD-10-CM

## 2023-06-30 DIAGNOSIS — D64.9 ANEMIA, UNSPECIFIED: ICD-10-CM

## 2023-06-30 DIAGNOSIS — R11.0 NAUSEA: ICD-10-CM

## 2023-06-30 DIAGNOSIS — R74.8 ABNORMAL LEVELS OF OTHER SERUM ENZYMES: ICD-10-CM

## 2023-06-30 DIAGNOSIS — K59.03 DRUG INDUCED CONSTIPATION: ICD-10-CM

## 2023-06-30 PROCEDURE — 99232 SBSQ HOSP IP/OBS MODERATE 35: CPT

## 2023-07-06 PROBLEM — N39.0 UTI (URINARY TRACT INFECTION): Status: ACTIVE | Noted: 2023-07-06

## 2023-07-07 PROBLEM — E11.43 DIABETIC GASTROPARESIS: Chronic | Status: ACTIVE | Noted: 2023-07-07

## 2023-07-07 PROBLEM — K59.09 CHRONIC CONSTIPATION: Chronic | Status: ACTIVE | Noted: 2023-07-07

## 2023-07-07 PROBLEM — K31.84 DIABETIC GASTROPARESIS: Chronic | Status: ACTIVE | Noted: 2023-07-07

## 2023-07-07 PROBLEM — R29.6 FREQUENT FALLS: Status: ACTIVE | Noted: 2023-07-07

## 2023-07-20 ENCOUNTER — LAB (OUTPATIENT)
Dept: LAB | Facility: HOSPITAL | Age: 48
End: 2023-07-20
Payer: MEDICARE

## 2023-07-20 DIAGNOSIS — C92.10 CML (CHRONIC MYELOCYTIC LEUKEMIA): ICD-10-CM

## 2023-07-20 DIAGNOSIS — I50.9 CONGESTIVE HEART FAILURE, UNSPECIFIED HF CHRONICITY, UNSPECIFIED HEART FAILURE TYPE: ICD-10-CM

## 2023-07-20 DIAGNOSIS — C92.10 CHRONIC MYELOID LEUKEMIA, BCR/ABL-POSITIVE, NOT HAVING ACHIEVED REMISSION: Primary | ICD-10-CM

## 2023-07-20 LAB
ALBUMIN SERPL-MCNC: 4.5 G/DL (ref 3.5–5.2)
ALBUMIN/GLOB SERPL: 1.6 G/DL
ALP SERPL-CCNC: 347 U/L (ref 39–117)
ALT SERPL W P-5'-P-CCNC: 43 U/L (ref 1–33)
ANION GAP SERPL CALCULATED.3IONS-SCNC: 12 MMOL/L (ref 5–15)
AST SERPL-CCNC: 39 U/L (ref 1–32)
BASOPHILS # BLD AUTO: 0.11 10*3/MM3 (ref 0–0.2)
BASOPHILS NFR BLD AUTO: 1.9 % (ref 0–1.5)
BILIRUB SERPL-MCNC: 1.2 MG/DL (ref 0–1.2)
BUN SERPL-MCNC: 10 MG/DL (ref 6–20)
BUN/CREAT SERPL: 18.5 (ref 7–25)
CALCIUM SPEC-SCNC: 10.1 MG/DL (ref 8.6–10.5)
CHLORIDE SERPL-SCNC: 99 MMOL/L (ref 98–107)
CO2 SERPL-SCNC: 27 MMOL/L (ref 22–29)
CREAT SERPL-MCNC: 0.54 MG/DL (ref 0.57–1)
DEPRECATED RDW RBC AUTO: 53.5 FL (ref 37–54)
EGFRCR SERPLBLD CKD-EPI 2021: 114.4 ML/MIN/1.73
EOSINOPHIL # BLD AUTO: 0.07 10*3/MM3 (ref 0–0.4)
EOSINOPHIL NFR BLD AUTO: 1.2 % (ref 0.3–6.2)
ERYTHROCYTE [DISTWIDTH] IN BLOOD BY AUTOMATED COUNT: 17 % (ref 12.3–15.4)
GLOBULIN UR ELPH-MCNC: 2.8 GM/DL
GLUCOSE SERPL-MCNC: 222 MG/DL (ref 65–99)
HCT VFR BLD AUTO: 29.3 % (ref 34–46.6)
HGB BLD-MCNC: 9.8 G/DL (ref 12–15.9)
HOLD SPECIMEN: NORMAL
LYMPHOCYTES # BLD AUTO: 1.78 10*3/MM3 (ref 0.7–3.1)
LYMPHOCYTES NFR BLD AUTO: 30.2 % (ref 19.6–45.3)
MCH RBC QN AUTO: 30.2 PG (ref 26.6–33)
MCHC RBC AUTO-ENTMCNC: 33.4 G/DL (ref 31.5–35.7)
MCV RBC AUTO: 90.2 FL (ref 79–97)
MONOCYTES # BLD AUTO: 0.51 10*3/MM3 (ref 0.1–0.9)
MONOCYTES NFR BLD AUTO: 8.6 % (ref 5–12)
NEUTROPHILS NFR BLD AUTO: 3.43 10*3/MM3 (ref 1.7–7)
NEUTROPHILS NFR BLD AUTO: 58.1 % (ref 42.7–76)
PLATELET # BLD AUTO: 109 10*3/MM3 (ref 140–450)
PMV BLD AUTO: 8 FL (ref 6–12)
POTASSIUM SERPL-SCNC: 4.2 MMOL/L (ref 3.5–5.2)
PROT SERPL-MCNC: 7.3 G/DL (ref 6–8.5)
RBC # BLD AUTO: 3.25 10*6/MM3 (ref 3.77–5.28)
SODIUM SERPL-SCNC: 138 MMOL/L (ref 136–145)
WBC NRBC COR # BLD: 5.9 10*3/MM3 (ref 3.4–10.8)

## 2023-07-20 PROCEDURE — 85025 COMPLETE CBC W/AUTO DIFF WBC: CPT

## 2023-07-20 PROCEDURE — 36415 COLL VENOUS BLD VENIPUNCTURE: CPT

## 2023-07-20 PROCEDURE — 80053 COMPREHEN METABOLIC PANEL: CPT | Performed by: INTERNAL MEDICINE

## 2023-07-24 ENCOUNTER — HOME CARE VISIT (OUTPATIENT)
Dept: HOME HEALTH SERVICES | Facility: HOME HEALTHCARE | Age: 48
End: 2023-07-24
Payer: COMMERCIAL

## 2023-07-24 ENCOUNTER — HOME CARE VISIT (OUTPATIENT)
Dept: HOME HEALTH SERVICES | Facility: HOME HEALTHCARE | Age: 48
End: 2023-07-24
Payer: MEDICAID

## 2023-07-24 ENCOUNTER — LAB REQUISITION (OUTPATIENT)
Dept: LAB | Facility: HOSPITAL | Age: 48
End: 2023-07-24
Payer: MEDICARE

## 2023-07-24 VITALS
OXYGEN SATURATION: 96 % | RESPIRATION RATE: 19 BRPM | DIASTOLIC BLOOD PRESSURE: 66 MMHG | TEMPERATURE: 98.4 F | SYSTOLIC BLOOD PRESSURE: 166 MMHG | HEART RATE: 90 BPM

## 2023-07-24 DIAGNOSIS — C92.10 CHRONIC MYELOID LEUKEMIA, BCR/ABL-POSITIVE, NOT HAVING ACHIEVED REMISSION: ICD-10-CM

## 2023-07-24 LAB
BASOPHILS # BLD AUTO: 0.1 10*3/MM3 (ref 0–0.2)
BASOPHILS NFR BLD AUTO: 1.8 % (ref 0–1.5)
DEPRECATED RDW RBC AUTO: 55.1 FL (ref 37–54)
EOSINOPHIL # BLD AUTO: 0.1 10*3/MM3 (ref 0–0.4)
EOSINOPHIL NFR BLD AUTO: 0.8 % (ref 0.3–6.2)
ERYTHROCYTE [DISTWIDTH] IN BLOOD BY AUTOMATED COUNT: 17.5 % (ref 12.3–15.4)
HCT VFR BLD AUTO: 26 % (ref 34–46.6)
HGB BLD-MCNC: 8.7 G/DL (ref 12–15.9)
LYMPHOCYTES # BLD AUTO: 1.8 10*3/MM3 (ref 0.7–3.1)
LYMPHOCYTES NFR BLD AUTO: 26.4 % (ref 19.6–45.3)
MCH RBC QN AUTO: 30.1 PG (ref 26.6–33)
MCHC RBC AUTO-ENTMCNC: 33.2 G/DL (ref 31.5–35.7)
MCV RBC AUTO: 90.5 FL (ref 79–97)
MONOCYTES # BLD AUTO: 0.6 10*3/MM3 (ref 0.1–0.9)
MONOCYTES NFR BLD AUTO: 9 % (ref 5–12)
NEUTROPHILS NFR BLD AUTO: 4.1 10*3/MM3 (ref 1.7–7)
NEUTROPHILS NFR BLD AUTO: 62 % (ref 42.7–76)
NRBC BLD AUTO-RTO: 0.1 /100 WBC (ref 0–0.2)
PLATELET # BLD AUTO: 95 10*3/MM3 (ref 140–450)
PMV BLD AUTO: 7.3 FL (ref 6–12)
RBC # BLD AUTO: 2.88 10*6/MM3 (ref 3.77–5.28)
WBC NRBC COR # BLD: 6.6 10*3/MM3 (ref 3.4–10.8)

## 2023-07-24 PROCEDURE — G0299 HHS/HOSPICE OF RN EA 15 MIN: HCPCS

## 2023-07-24 PROCEDURE — 85025 COMPLETE CBC W/AUTO DIFF WBC: CPT | Performed by: INTERNAL MEDICINE

## 2023-07-25 NOTE — HOME HEALTH
SN spoke to the patient aboout mobility and exercise that may assist with her leg pains.     pt stated undersanding. SN gave patient some exercises she can do while sitting in bed for prevention of falls. ankle rolls. leg lifts. pt was able do demonstrate with ease.

## 2023-07-26 ENCOUNTER — HOME CARE VISIT (OUTPATIENT)
Dept: HOME HEALTH SERVICES | Facility: HOME HEALTHCARE | Age: 48
End: 2023-07-26
Payer: COMMERCIAL

## 2023-07-28 ENCOUNTER — PATIENT OUTREACH (OUTPATIENT)
Dept: CASE MANAGEMENT | Facility: CLINIC | Age: 48
End: 2023-07-28
Payer: MEDICAID

## 2023-07-28 NOTE — OUTREACH NOTE
Social Work Assessment  Questions/Answers      Flowsheet Row Most Recent Value   Referral Source outpatient staff, outpatient clinic, nursing   Preferred Language English   Advance Care Planning Reviewed no concerns identified   People in Home alone   Current Living Arrangements apartment   Potentially Unsafe Housing Conditions none   Primary Care Provided by self   Provides Primary Care For no one   Family Caregiver if Needed child(tobin), adult, friend(s)   Quality of Family Relationships helpful, involved   Able to Return to Prior Arrangements yes   Employment Status disabled   Source of Income disability, social security   Application for Public Assistance applied   Usual Activity Tolerance moderate   Current Activity Tolerance moderate   Medications independent   Meal Preparation independent   Housekeeping independent   Laundry independent   Shopping assistive person          Patient Outreach    SW received SW evaluation request via RN CM re: SDOH needs. SW called and spoke to patient via telephone. Patient lives in a studio apartment, has good social support, and reports no financial concerns. Patient reports concerns with HH canceling appts. SW encouraged patient to contact HH and follow up with them re: appts.. Patient reports no need for social intervention. Please re consult SW if additional needs arise.     Padma MCKINNON -   Ambulatory Case Management    7/28/2023, 12:57 EDT

## 2023-07-31 ENCOUNTER — HOME CARE VISIT (OUTPATIENT)
Dept: HOME HEALTH SERVICES | Facility: HOME HEALTHCARE | Age: 48
End: 2023-07-31
Payer: COMMERCIAL

## 2023-07-31 ENCOUNTER — LAB REQUISITION (OUTPATIENT)
Dept: LAB | Facility: HOSPITAL | Age: 48
End: 2023-07-31
Payer: MEDICARE

## 2023-07-31 VITALS
SYSTOLIC BLOOD PRESSURE: 138 MMHG | TEMPERATURE: 98.3 F | DIASTOLIC BLOOD PRESSURE: 60 MMHG | RESPIRATION RATE: 18 BRPM | OXYGEN SATURATION: 96 % | HEART RATE: 87 BPM

## 2023-07-31 DIAGNOSIS — C91.10 CHRONIC LYMPHOCYTIC LEUKEMIA OF B-CELL TYPE NOT HAVING ACHIEVED REMISSION: ICD-10-CM

## 2023-07-31 LAB
BASOPHILS # BLD AUTO: 0.1 10*3/MM3 (ref 0–0.2)
BASOPHILS NFR BLD AUTO: 1.7 % (ref 0–1.5)
DEPRECATED RDW RBC AUTO: 52.9 FL (ref 37–54)
EOSINOPHIL # BLD AUTO: 0.1 10*3/MM3 (ref 0–0.4)
EOSINOPHIL NFR BLD AUTO: 1.1 % (ref 0.3–6.2)
ERYTHROCYTE [DISTWIDTH] IN BLOOD BY AUTOMATED COUNT: 16.7 % (ref 12.3–15.4)
HCT VFR BLD AUTO: 24.9 % (ref 34–46.6)
HGB BLD-MCNC: 8.2 G/DL (ref 12–15.9)
LYMPHOCYTES # BLD AUTO: 1.6 10*3/MM3 (ref 0.7–3.1)
LYMPHOCYTES NFR BLD AUTO: 23.1 % (ref 19.6–45.3)
MCH RBC QN AUTO: 29.7 PG (ref 26.6–33)
MCHC RBC AUTO-ENTMCNC: 33 G/DL (ref 31.5–35.7)
MCV RBC AUTO: 90 FL (ref 79–97)
MONOCYTES # BLD AUTO: 0.9 10*3/MM3 (ref 0.1–0.9)
MONOCYTES NFR BLD AUTO: 12.2 % (ref 5–12)
NEUTROPHILS NFR BLD AUTO: 4.3 10*3/MM3 (ref 1.7–7)
NEUTROPHILS NFR BLD AUTO: 61.9 % (ref 42.7–76)
NRBC BLD AUTO-RTO: 0.1 /100 WBC (ref 0–0.2)
PLATELET # BLD AUTO: 106 10*3/MM3 (ref 140–450)
PMV BLD AUTO: 8 FL (ref 6–12)
RBC # BLD AUTO: 2.76 10*6/MM3 (ref 3.77–5.28)
WBC NRBC COR # BLD: 7 10*3/MM3 (ref 3.4–10.8)

## 2023-07-31 PROCEDURE — 85025 COMPLETE CBC W/AUTO DIFF WBC: CPT | Performed by: INTERNAL MEDICINE

## 2023-07-31 PROCEDURE — G0299 HHS/HOSPICE OF RN EA 15 MIN: HCPCS

## 2023-08-02 ENCOUNTER — HOME CARE VISIT (OUTPATIENT)
Dept: HOME HEALTH SERVICES | Facility: HOME HEALTHCARE | Age: 48
End: 2023-08-02
Payer: COMMERCIAL

## 2023-08-02 VITALS
DIASTOLIC BLOOD PRESSURE: 65 MMHG | OXYGEN SATURATION: 98 % | RESPIRATION RATE: 18 BRPM | TEMPERATURE: 97.8 F | SYSTOLIC BLOOD PRESSURE: 105 MMHG | HEART RATE: 108 BPM

## 2023-08-02 PROCEDURE — G0151 HHCP-SERV OF PT,EA 15 MIN: HCPCS

## 2023-08-04 ENCOUNTER — HOME CARE VISIT (OUTPATIENT)
Dept: HOME HEALTH SERVICES | Facility: HOME HEALTHCARE | Age: 48
End: 2023-08-04
Payer: COMMERCIAL

## 2023-08-04 VITALS
TEMPERATURE: 98.2 F | DIASTOLIC BLOOD PRESSURE: 68 MMHG | OXYGEN SATURATION: 95 % | HEART RATE: 99 BPM | RESPIRATION RATE: 18 BRPM | SYSTOLIC BLOOD PRESSURE: 140 MMHG

## 2023-08-04 PROCEDURE — G0299 HHS/HOSPICE OF RN EA 15 MIN: HCPCS

## 2023-08-07 ENCOUNTER — HOME CARE VISIT (OUTPATIENT)
Dept: HOME HEALTH SERVICES | Facility: HOME HEALTHCARE | Age: 48
End: 2023-08-07
Payer: MEDICAID

## 2023-08-07 ENCOUNTER — HOME CARE VISIT (OUTPATIENT)
Dept: HOME HEALTH SERVICES | Facility: HOME HEALTHCARE | Age: 48
End: 2023-08-07
Payer: COMMERCIAL

## 2023-08-07 ENCOUNTER — LAB REQUISITION (OUTPATIENT)
Dept: LAB | Facility: HOSPITAL | Age: 48
End: 2023-08-07
Payer: MEDICARE

## 2023-08-07 VITALS
SYSTOLIC BLOOD PRESSURE: 144 MMHG | RESPIRATION RATE: 18 BRPM | TEMPERATURE: 98.4 F | OXYGEN SATURATION: 96 % | HEART RATE: 108 BPM | DIASTOLIC BLOOD PRESSURE: 64 MMHG

## 2023-08-07 DIAGNOSIS — C92.10 CHRONIC MYELOID LEUKEMIA, BCR/ABL-POSITIVE, NOT HAVING ACHIEVED REMISSION: ICD-10-CM

## 2023-08-07 LAB
ANISOCYTOSIS BLD QL: ABNORMAL
BASOPHILS # BLD MANUAL: 0.09 10*3/MM3 (ref 0–0.2)
BASOPHILS NFR BLD MANUAL: 1 % (ref 0–1.5)
DEPRECATED RDW RBC AUTO: 50.3 FL (ref 37–54)
EOSINOPHIL # BLD MANUAL: 0.09 10*3/MM3 (ref 0–0.4)
EOSINOPHIL NFR BLD MANUAL: 1 % (ref 0.3–6.2)
ERYTHROCYTE [DISTWIDTH] IN BLOOD BY AUTOMATED COUNT: 16 % (ref 12.3–15.4)
HCT VFR BLD AUTO: 28.4 % (ref 34–46.6)
HGB BLD-MCNC: 9.3 G/DL (ref 12–15.9)
LYMPHOCYTES # BLD MANUAL: 2.07 10*3/MM3 (ref 0.7–3.1)
LYMPHOCYTES NFR BLD MANUAL: 11 % (ref 5–12)
MCH RBC QN AUTO: 29.4 PG (ref 26.6–33)
MCHC RBC AUTO-ENTMCNC: 32.9 G/DL (ref 31.5–35.7)
MCV RBC AUTO: 89.4 FL (ref 79–97)
METAMYELOCYTES NFR BLD MANUAL: 1 % (ref 0–0)
MONOCYTES # BLD: 1.03 10*3/MM3 (ref 0.1–0.9)
NEUTROPHILS # BLD AUTO: 6.02 10*3/MM3 (ref 1.7–7)
NEUTROPHILS NFR BLD MANUAL: 52 % (ref 42.7–76)
NEUTS BAND NFR BLD MANUAL: 12 % (ref 0–5)
PLAT MORPH BLD: NORMAL
PLATELET # BLD AUTO: 126 10*3/MM3 (ref 140–450)
PMV BLD AUTO: 7.9 FL (ref 6–12)
POIKILOCYTOSIS BLD QL SMEAR: ABNORMAL
RBC # BLD AUTO: 3.17 10*6/MM3 (ref 3.77–5.28)
SCAN SLIDE: NORMAL
VARIANT LYMPHS NFR BLD MANUAL: 22 % (ref 19.6–45.3)
WBC MORPH BLD: NORMAL
WBC NRBC COR # BLD: 9.4 10*3/MM3 (ref 3.4–10.8)

## 2023-08-07 PROCEDURE — 85025 COMPLETE CBC W/AUTO DIFF WBC: CPT | Performed by: INTERNAL MEDICINE

## 2023-08-07 PROCEDURE — G0299 HHS/HOSPICE OF RN EA 15 MIN: HCPCS

## 2023-08-07 NOTE — HOME HEALTH
Routine Visit Note:    Skill/education provided: cardiopulmonary assessment, med education, pain assessment, fall prevention, labs per md order    Patient/caregiver response: pt stated understanding    Plan for next visit: cardiopulmonary assessment, med education, pain assessment, fall prevention, labs per md order      Other pertinent info: monitor for falls

## 2023-08-09 ENCOUNTER — HOME CARE VISIT (OUTPATIENT)
Dept: HOME HEALTH SERVICES | Facility: HOME HEALTHCARE | Age: 48
End: 2023-08-09
Payer: COMMERCIAL

## 2023-08-09 ENCOUNTER — HOME CARE VISIT (OUTPATIENT)
Dept: HOME HEALTH SERVICES | Facility: HOME HEALTHCARE | Age: 48
End: 2023-08-09
Payer: MEDICAID

## 2023-08-11 ENCOUNTER — HOME CARE VISIT (OUTPATIENT)
Dept: HOME HEALTH SERVICES | Facility: HOME HEALTHCARE | Age: 48
End: 2023-08-11
Payer: COMMERCIAL

## 2023-08-11 VITALS
DIASTOLIC BLOOD PRESSURE: 100 MMHG | TEMPERATURE: 97.5 F | OXYGEN SATURATION: 98 % | HEART RATE: 123 BPM | SYSTOLIC BLOOD PRESSURE: 145 MMHG

## 2023-08-11 PROCEDURE — G0152 HHCP-SERV OF OT,EA 15 MIN: HCPCS

## 2023-08-14 ENCOUNTER — HOME CARE VISIT (OUTPATIENT)
Dept: HOME HEALTH SERVICES | Facility: HOME HEALTHCARE | Age: 48
End: 2023-08-14
Payer: COMMERCIAL

## 2023-08-14 ENCOUNTER — HOME CARE VISIT (OUTPATIENT)
Dept: HOME HEALTH SERVICES | Facility: HOME HEALTHCARE | Age: 48
End: 2023-08-14
Payer: MEDICAID

## 2023-08-14 ENCOUNTER — LAB REQUISITION (OUTPATIENT)
Dept: LAB | Facility: HOSPITAL | Age: 48
End: 2023-08-14
Payer: MEDICARE

## 2023-08-14 VITALS
HEART RATE: 89 BPM | DIASTOLIC BLOOD PRESSURE: 68 MMHG | TEMPERATURE: 98.7 F | RESPIRATION RATE: 18 BRPM | OXYGEN SATURATION: 94 % | SYSTOLIC BLOOD PRESSURE: 158 MMHG

## 2023-08-14 DIAGNOSIS — C92.10 CHRONIC MYELOID LEUKEMIA, BCR/ABL-POSITIVE, NOT HAVING ACHIEVED REMISSION: ICD-10-CM

## 2023-08-14 LAB
ANISOCYTOSIS BLD QL: ABNORMAL
BASOPHILS # BLD MANUAL: 0.18 10*3/MM3 (ref 0–0.2)
BASOPHILS NFR BLD MANUAL: 1 % (ref 0–1.5)
DEPRECATED RDW RBC AUTO: 51.2 FL (ref 37–54)
ERYTHROCYTE [DISTWIDTH] IN BLOOD BY AUTOMATED COUNT: 15.9 % (ref 12.3–15.4)
HCT VFR BLD AUTO: 29.1 % (ref 34–46.6)
HGB BLD-MCNC: 9.5 G/DL (ref 12–15.9)
LYMPHOCYTES # BLD MANUAL: 1.29 10*3/MM3 (ref 0.7–3.1)
LYMPHOCYTES NFR BLD MANUAL: 6 % (ref 5–12)
MCH RBC QN AUTO: 28.1 PG (ref 26.6–33)
MCHC RBC AUTO-ENTMCNC: 32.7 G/DL (ref 31.5–35.7)
MCV RBC AUTO: 85.9 FL (ref 79–97)
METAMYELOCYTES NFR BLD MANUAL: 12 % (ref 0–0)
MONOCYTES # BLD: 1.1 10*3/MM3 (ref 0.1–0.9)
MYELOCYTES NFR BLD MANUAL: 6 % (ref 0–0)
NEUTROPHILS # BLD AUTO: 12.51 10*3/MM3 (ref 1.7–7)
NEUTROPHILS NFR BLD MANUAL: 57 % (ref 42.7–76)
NEUTS BAND NFR BLD MANUAL: 11 % (ref 0–5)
PATHOLOGY REVIEW: YES
PLATELET # BLD AUTO: 128 10*3/MM3 (ref 140–450)
PMV BLD AUTO: 7.2 FL (ref 6–12)
POLYCHROMASIA BLD QL SMEAR: ABNORMAL
RBC # BLD AUTO: 3.39 10*6/MM3 (ref 3.77–5.28)
SCAN SLIDE: NORMAL
SMALL PLATELETS BLD QL SMEAR: ABNORMAL
VARIANT LYMPHS NFR BLD MANUAL: 1 % (ref 0–5)
VARIANT LYMPHS NFR BLD MANUAL: 6 % (ref 19.6–45.3)
WBC MORPH BLD: NORMAL
WBC NRBC COR # BLD: 18.4 10*3/MM3 (ref 3.4–10.8)

## 2023-08-14 PROCEDURE — G0299 HHS/HOSPICE OF RN EA 15 MIN: HCPCS

## 2023-08-14 PROCEDURE — G0157 HHC PT ASSISTANT EA 15: HCPCS

## 2023-08-14 PROCEDURE — 85025 COMPLETE CBC W/AUTO DIFF WBC: CPT | Performed by: INTERNAL MEDICINE

## 2023-08-14 NOTE — HOME HEALTH
Routine Visit Note:    Skill/education provided:  cardiopulmonary assessment,med education, pain assessment, fall prevention education, labs per md order    Patient/caregiver response: pt stated understanding    Plan for next visit:  cardiopulmonary assessment,med education, pain assessment, fall prevention education, labs per md order    Other pertinent info: monitor for insulin and galindo

## 2023-08-14 NOTE — PROGRESS NOTES
Hematology/Oncology Outpatient Follow Up    PATIENT NAME:Nieves Mc  :1975  MRN: [id number]  PRIMARY CARE PHYSICIAN: Alida Latham APRN  REFERRING PHYSICIAN: Alida Latham, *    Chief Complaint   Patient presents with    Follow-up     CML (chronic myelocytic leukemia)        HISTORY OF PRESENT ILLNESS:      Patient is a 47 y.o. female with PMH significant for CML on treatment with Imatinib.  Patient stated that she typically feels poorly with Imatinib with frequent nausea and vomiting.  Also complained of weakness and fatigue.  Patient presented to ED on 10/22/18 with complaints of an elevated blood sugar around 400.  Stated she felt poorly and has had a productive cough with yellow sputum.  Complained of nausea and stated she was unable to keep any food down anytime she ate.  The patient reported subjective fever without chills.  She stated she had been coughing so hard that she was vomiting.  She denied hematemesis.  Denied diarrhea, constipation or blood in her stool.       Patient's chest x-ray showed no evidence of acute pulmonary embolus.  The patient has ground-glass opacities throughout the lungs.  There is some air trapping noted which would appear to be   infectious.  Patient was started on antibiotics.      Hem/Onc was consulted on 10/23/18 for co-management of patient with CML.  Patient was seen by Dr. Lerma on 10/12 on previous admission for patient reported CML on Imatinib (Gleevec).  Patient reports she is under the care of Dr. Roach at [facility] in [place].  Patient was seen by Dr. Lerma in May 2018 when patient presented with hematuria, which was attributed to her Imatinib.  Dr. Lerma followed during hospitalization but then patient returned to Dr. Roach for her usual follow up.  She was again seen on 10/12.  Patient is stating she will switch to Dr. Lerma.    Review of Dr. Roach's note:  On 18 patient had BCR-abl which was 61.326.  Patient had  been on Imatinib 400 mg daily.  She had presented in March 2018 with WBC of 24, hemoglobin 13.1, platelet count of 1,067,000.  Patient apparently had follow up BCR-abl in August 2018, results are not available for review.  Her bone marrow aspiration and biopsy was also performed at that time is currently not available for review.    11/6/18 - .  Uric acid 6.5.  BCR-abl by RT-PCR was measured at 3.36%.    Due to thrombocytosis, patient was placed on Hydroxyurea 500 mg twice a day on 12/11/18.  Platelet count juana to 900,000.  Patient was noncompliant with CBCs that were recommended.    Patient was asked to return to the office after not being able to reach her.   12/27/18 - Bone marrow aspiration and biopsy was consistent with chronic myeloid leukemia.  BCR-abl positive, chronic phase.  ABL kinase mutational analysis is currently pending on the bone marrow.    1/3/19 - Repeat CBC, WBC 17, hemoglobin 11.9, platelet count 1,072,000.  Hydroxyurea was increased to 1 gm twice a day with follow up CBC.    1/6/19 - Follow up CBC showed progressive increase in platelet to 1.1 million.  Patient was offered admission to the hospital, which she declined.  Hydroxyurea was increased to 1 gm three   times a day as of 1/6/19.   1/7/19 - EKG shows sinus tachycardia.   milliseconds.    ABL kinase on peripheral blood was ordered on 1/11/19.  Based on sequence analysis, there was no mutation detected.    2/12/19 - Patient was initiated on Tasigna 300 mg twice a day.  2/13/19 - Urinalysis was negative for hematuria.   2/22/19 -  milliseconds.  3/13/19 - EKG with QTC is 413 milliseconds.  Nonspecific changes noted.    4/18/19 - EKG showed QTC prolonged to 453 milliseconds.  This is changed from prior.  4/18/19 - Free T4 normal at 0.91.  Magnesium was 1.7.  BUN is 6.  Creatinine 0.7.  Bilirubin 2.2.  Phosphorous normal 3.7.  TSH 0.43, normal.  Free T3 was normal at 3.47.  Ionized calcium   normal at 1.23.  BCR-abl was  0.522%.    5/7/19 - EKG showed QTC of 441 milliseconds.  QT was 366.     Patient has been lost to follow-up since 2019 for CML.  Patient states that she lost her insurance.  She also does not have any primary care physician at this time.  She is diabetic on insulin.  Patient was recently admitted to the hospital for pneumonia due to COVID-19 infection.  At that time patient made a decision to become compliant with treatment.  She is currently on to Cigna 300 mg p.o. twice a day.  She is also on hydroxyurea 1 g twice a day.  Overall she feels better, she has more energy.  3/1/2022 white count is 53.92, hemoglobin 11.7 and platelets are 472    6/1/2022: Patient has not been seen since March 2022.  Also verified that she has not been taking to Tasigna since February 2022.  She comes in today with cough for 1 and half months, shortness of breath, denies fevers.  6/30/2022 patient had 2D echocardiogram which showed an EF of 41 to 45%.  Left ventricular cavity is mildly dilated.  She was diagnosed with nonischemic cardiomyopathy    11/18/2022: Patient was transferred from [facility] to [facility] due to cardiac failure.  She was then seen by [facility] hematology department.  Patient underwent tumor aspiration and biopsy at that time did not show chronic myeloid leukemia in accelerated phase markedly hypercellular marrow with megakaryocytic and myeloid hyperplasia with atypia and increased basophils blasts are not increased less than 1% moderate reticulin fibrosis.  According to the patient hydroxyurea was discontinued she was prescribed Gleevec 600 mg daily but she has only been taking 400 mg as she cannot find other milligram tablets.  She is already been pump inserted into her pelvic area.  She continues to experience nausea which resulted in her not taking the baclofen she should.  1/12/2023: WBC 17.64, hemoglobin 10.2, MCV 88.8, platelets 458,000.  On Gleevec 600 mg daily and hydroxyurea  500 mg twice daily.  2023: WBC 10.67, hemoglobin 10.0, MCV 86.4, platelets 370,000.  Patient on Gleevec 600 mg daily and hydroxyurea 500 mg once a day.  Patient instructed at the visit to discontinue her hydroxyurea.  2023: WBC 17.75, hemoglobin 10.4, MCV 87.2, platelets 425,000.  On Gleevec 600 mg daily.  3/10/2023: 2D echocardiogram showing EF of 44% EKG showing sinus tachycardia QTc of 491    Past Medical History:   Diagnosis Date    Bone pain     Chronic constipation 2023    COVID-19 virus infection 2022    Diabetes mellitus     Diabetic gastroparesis 2023    Extremity pain     Carlin. legs pain    Fall during current hospitalization 2023    Leg pain     left leg greater    Migraine     Pubic ramus fracture, left, closed, initial encounter 2023    Pulmonary embolism     Vision loss     doing surgery       Past Surgical History:   Procedure Laterality Date    BONE MARROW BIOPSY      BREAST SURGERY      BRONCHOSCOPY N/A 2022    Procedure: BRONCHOSCOPY bilateral lung washing;  Surgeon: Charlene Camara MD;  Location: [facility] ENDOSCOPY;  Service: Pulmonary;  Laterality: N/A;  post: rule out infection vs transfusion lung injury     SECTION      CHOLECYSTECTOMY      COLONOSCOPY N/A 2023    Procedure: COLONOSCOPY;  Surgeon: Freddy Villeda MD;  Location: [facility] ENDOSCOPY;  Service: Gastroenterology;  Laterality: N/A;  poor prep    ENDOSCOPY N/A 2023    Procedure: ESOPHAGOGASTRODUODENOSCOPY with biopsy x2 areas;  Surgeon: Freddy Villeda MD;  Location: [facility] ENDOSCOPY;  Service: Gastroenterology;  Laterality: N/A;  food in stomach;abnormal duodenal mucosa    EYE SURGERY      laser surgery due  to hemmorage--- 2021-- another surgery  lt eye11/15/21    RETINAL DETACHMENT SURGERY      SPINE SURGERY      Lombardi spinal block    TUBAL ABDOMINAL LIGATION           Current Outpatient Medications:     acetaminophen (TYLENOL) 325 MG tablet, Take 2 tablets by mouth  Every 4 (Four) Hours As Needed. Indications: Fever, Pain, Disp: , Rfl:     ALPRAZolam (Xanax) 0.5 MG tablet, Take 1 tablet by mouth At Night As Needed for Anxiety. Indications: Feeling Anxious, Disp: 30 tablet, Rfl: 0    budesonide-formoterol (SYMBICORT) 160-4.5 MCG/ACT inhaler, Inhale 2 puffs 2 (Two) Times a Day., Disp: 10.2 g, Rfl: 1    citalopram (CeleXA) 10 MG tablet, Take 1 tablet by mouth Daily. Indications: Generalized Anxiety Disorder, Disp: , Rfl:     Continuous Blood Gluc Sensor (FreeStyle Zahira 2 Sensor) misc, , Disp: , Rfl:     dapagliflozin Propanediol (Farxiga) 10 MG tablet, Take 10 mg by mouth Daily., Disp: 90 tablet, Rfl: 3    furosemide (LASIX) 40 MG tablet, Take 1 tablet by mouth Daily. Indications: Edema, Disp: , Rfl:     gabapentin (NEURONTIN) 300 MG capsule, Take 1 capsule by mouth 3 (Three) Times a Day. Indications: Diabetes with Nerve Disease, Disp: , Rfl:     hydrOXYzine (ATARAX) 25 MG tablet, Take 1 tablet by mouth Every 8 (Eight) Hours As Needed. Indications: Feeling Anxious, Disp: , Rfl:     Insulin Glargine (BASAGLAR KWIKPEN) 100 UNIT/ML injection pen, Inject 20 Units under the skin into the appropriate area as directed Daily., Disp: 10 mL, Rfl: 3    metoclopramide (Reglan) 10 MG tablet, Take 1 tablet by mouth 4 (Four) Times a Day Before Meals & at Bedtime., Disp: 120 tablet, Rfl: 11    metoclopramide (REGLAN) 10 MG tablet, Take 10 mg by mouth 4 (Four) Times a Day. Indications: Indigestion, Disp: , Rfl:     metoprolol succinate XL (TOPROL-XL) 25 MG 24 hr tablet, Take 1 tablet by mouth Daily., Disp: 90 tablet, Rfl: 3    midodrine (PROAMATINE) 10 MG tablet, Take 1 tablet by mouth Every 8 (Eight) Hours., Disp: 90 tablet, Rfl: 0    mirtazapine (REMERON) 15 MG tablet, Take 1 tablet by mouth every night at bedtime., Disp: 30 tablet, Rfl: 2    ondansetron ODT (ZOFRAN-ODT) 8 MG disintegrating tablet, Place 1 tablet on the tongue Every 8 (Eight) Hours As Needed for Nausea or Vomiting., Disp: 20  tablet, Rfl: 2    oxyCODONE (ROXICODONE) 10 MG tablet, Take 1 tablet by mouth Every 6 (Six) Hours As Needed for Moderate Pain. Indications: Chronic Pain, Disp: 40 tablet, Rfl: 0    pain patient supplied pump, 0.375 mL/hr by Intrathecal route Daily. Active pump Prescriber: Dr. Shelton - pain management  Med name/ concentration: Dilaudid  Last refill: due soon Lockout period: every 4 hours   Indications: chronic pain, Disp: , Rfl:     tiZANidine (ZANAFLEX) 4 MG tablet, Take 1 tablet by mouth Daily. Indications: Musculoskeletal Pain, Disp: , Rfl:     traZODone (DESYREL) 50 MG tablet, Take 25 mg by mouth every night at bedtime. Indications: Trouble Sleeping, Disp: , Rfl:   No current facility-administered medications for this visit.    Facility-Administered Medications Ordered in Other Visits:     acetaminophen (TYLENOL) tablet 650 mg, 650 mg, Oral, Once, Denisha Gill APRN    No Known Allergies    Family History   Problem Relation Age of Onset    Diabetes Mother     Diabetes Maternal Grandmother     Heart attack Maternal Grandmother     Stroke Maternal Grandmother        Cancer-related family history is not on file.    Social History     Tobacco Use    Smoking status: Never    Smokeless tobacco: Never   Vaping Use    Vaping Use: Never used   Substance Use Topics    Alcohol use: No    Drug use: No     I have reviewed and confirmed the accuracy of the patient's history: Chief complaint, HPI, ROS, and Subjective as entered by the MA/LPN/RN. Meghann Lerma MD 08/15/23      SUBJECTIVE:    Pain management is better on 10 mg of oxycodone every 4 hours.  Patient to follow-up with her pain management physician in [place] in July.     She was recently seen by her pain physician in [place].  Pain pump was increased    Patient denies any new issues.  She is here today accompanied by her children for this appointment.          REVIEW OF SYSTEMS:    Review of Systems   Constitutional: Negative for chills and  fever.   HENT: Negative for ear pain, mouth sores, nosebleeds and sore throat.    Eyes: Negative for photophobia and visual disturbance.   Respiratory: Negative for wheezing and stridor.    Cardiovascular: Negative for chest pain and palpitations.   Gastrointestinal: Negative for abdominal pain, diarrhea, nausea and vomiting.   Endocrine: Negative for cold intolerance and heat intolerance.   Genitourinary: Negative for dysuria and hematuria.   Musculoskeletal: Negative for joint swelling and neck stiffness.  Positive for back and leg pain.   Skin: Negative for color change and rash.   Neurological: Negative for seizures and syncope.   Hematological: Negative for adenopathy.        No obvious bleeding   Psychiatric/Behavioral: Negative for agitation, confusion and hallucinations. Positive for insomnia, anxiety, depression.      OBJECTIVE:    Vitals:    08/15/23 1237   BP: 124/82   Pulse: (!) 123   Resp: 18   SpO2: 99%   PainSc:   7   PainLoc: Leg         There is no height or weight on file to calculate BMI.    ECOG  (1) Restricted in physically strenuous activity, ambulatory and able to do work of light nature    Physical Exam  Vitals and nursing note reviewed.   Constitutional:       General: She is not in acute distress.     Appearance: She is not diaphoretic.   HENT:      Head: Normocephalic and atraumatic.   Eyes:      General: No scleral icterus.        Right eye: No discharge.         Left eye: No discharge.      Conjunctiva/sclera: Conjunctivae normal.   Neck:      Thyroid: No thyromegaly.   Cardiovascular:      Rate and Rhythm: Normal rate and regular rhythm.      Heart sounds: Normal heart sounds. No friction rub. No gallop.    Pulmonary:      Effort: Pulmonary effort is normal. No respiratory distress.      Breath sounds: No stridor. No wheezing.   Abdominal:      General: Bowel sounds are normal.      Palpations: Abdomen is soft. There is no mass.      Tenderness: There is no abdominal tenderness. There  is no guarding or rebound.   Musculoskeletal:         General: No tenderness. Normal range of motion.      Cervical back: Normal range of motion and neck supple.   Lymphadenopathy:      Cervical: No cervical adenopathy.   Skin:     General: Skin is warm.      Findings: No erythema or rash.   Neurological:      Mental Status: She is alert and oriented to person, place, and time.      Motor: No abnormal muscle tone.   Psychiatric:         Behavior: Behavior anxious and tearful    I have reexamined the patient and the results are consistent with the previously documented exam. Meghann Leeann Lerma MD      RECENT LABS    WBC   Date Value Ref Range Status   08/15/2023 16.70 (H) 3.40 - 10.80 10*3/mm3 Final     RBC   Date Value Ref Range Status   08/15/2023 2.94 (L) 3.77 - 5.28 10*6/mm3 Final     Hemoglobin   Date Value Ref Range Status   08/15/2023 8.5 (L) 12.0 - 15.9 g/dL Final     Hematocrit   Date Value Ref Range Status   08/15/2023 26.8 (L) 34.0 - 46.6 % Final     MCV   Date Value Ref Range Status   08/15/2023 91.2 79.0 - 97.0 fL Final     MCH   Date Value Ref Range Status   08/15/2023 28.9 26.6 - 33.0 pg Final     MCHC   Date Value Ref Range Status   08/15/2023 31.7 31.5 - 35.7 g/dL Final     RDW   Date Value Ref Range Status   08/15/2023 15.1 12.3 - 15.4 % Final     RDW-SD   Date Value Ref Range Status   08/15/2023 48.2 37.0 - 54.0 fl Final     MPV   Date Value Ref Range Status   08/15/2023 8.8 6.0 - 12.0 fL Final     Platelets   Date Value Ref Range Status   08/15/2023 99 (L) 140 - 450 10*3/mm3 Final     Neutrophil %   Date Value Ref Range Status   08/15/2023 75.5 42.7 - 76.0 % Final     Lymphocyte %   Date Value Ref Range Status   08/15/2023 11.2 (L) 19.6 - 45.3 % Final     Monocyte %   Date Value Ref Range Status   08/15/2023 12.1 (H) 5.0 - 12.0 % Final     Eosinophil %   Date Value Ref Range Status   08/15/2023 0.8 0.3 - 6.2 % Final     Basophil %   Date Value Ref Range Status   08/15/2023 0.4 0.0 - 1.5 %  Final     External Neutrophils Abs   Date Value Ref Range Status   11/30/2022 7.6 (H) 1.7 - 6.0 x10(3)/ul Final     Neutrophils, Absolute   Date Value Ref Range Status   08/15/2023 12.60 (H) 1.70 - 7.00 10*3/mm3 Final     Lymphocytes, Absolute   Date Value Ref Range Status   08/15/2023 1.87 0.70 - 3.10 10*3/mm3 Final     Monocytes, Absolute   Date Value Ref Range Status   08/15/2023 2.02 (H) 0.10 - 0.90 10*3/mm3 Final     Eosinophils, Absolute   Date Value Ref Range Status   08/15/2023 0.14 0.00 - 0.40 10*3/mm3 Final     Basophils, Absolute   Date Value Ref Range Status   08/15/2023 0.07 0.00 - 0.20 10*3/mm3 Final     nRBC   Date Value Ref Range Status   07/31/2023 0.1 0.0 - 0.2 /100 WBC Final       Lab Results   Component Value Date    GLUCOSE 222 (H) 07/20/2023    BUN 10 07/20/2023    CREATININE 0.54 (L) 07/20/2023    EGFRIFNONA 133 02/02/2022    BCR 18.5 07/20/2023    K 4.2 07/20/2023    CO2 27.0 07/20/2023    CALCIUM 10.1 07/20/2023    ALBUMIN 4.5 07/20/2023    LABIL2 1.0 04/18/2019    AST 39 (H) 07/20/2023    ALT 43 (H) 07/20/2023           ASSESSMENT    Accelerated phase CML, status post bone marrow biopsy on 11/3/2022.  Patient was on ponatinib but developed significant pancytopenia and therefore treatment has been held.  Her counts are beginning to improve therefore we will go ahead and restart ponatinib at 10 mg every other day.  Patient has concerns regarding significant pancytopenia.  We will also draw BCR ABL LDH, uric acid levels today as well as CMP  Pain management issues, patient initially refusing to follow-up with her physician at [facility].  Patient has been seen by her pain physician.  She will continue to follow-up with him  Pancytopenia: Counts are slowly improving  BCR ABL kinase mutation assay was negative  Cardiomyopathy her most recent echo shows an EF of 44% which is an improvement from 26 to 30%.  She will continue to follow-up with her cardiologist  Anxiety:  Refill angiolytics today  Pancytopenia secondary to med, CML not requiring blood transfusion now.  For now continue to observe she will remain off TKI's  CML in chronic phase with no significant hematologic or molecular or cytogenetic response on   Imatinib.  ABL kinase mutational analysis was negative.  We  have represcribed to Tasigna 300 mg twice a day.  Patient has been noncompliant with treatments and ended up in the hospital with hyperleukocytosis, peripheral blood blasts.  Bone marrow biopsy suggest that she remains in chronic phase.  Continue to Tasiigna at the current doses.  Hydroxyurea has been discontinued.  Uncontrolled diabetes type 1, followed at the [facility] by Dr. Calles  Chronic anemia: Stable, multifactorial from CML, to Tasigna, hydroxyurea.  This has improved  Insomnia  Situational anxiety, not suicidal.  Continue Xanax 0.5 mg once daily.  Hyperleukocytosis secondary to CML  History of medical noncompliance  Pulmonary embolism: Xarelto restarted  Recent COVID-19 pneumonia  History of prolonged QTC        Plans    CBC reviewed  Would request for HLA matched platelet transfusion tomorrow  Change CBCs to once a week to be drawn by visiting nurses.  Continue  Restart ponatinib at 10 mg every other day for now and monitor her counts closely  CMP, LDH uric acid level, BCR ABL RT-PCR today  Discontinue oxycodone.  Pain management will take over her pain medications  BCR ABL kinase mutational analysis was negative  Request additional records from [facility]  DC trazodone 25 mg due to QT prolongation  Continue to follow-up with cardiology in regards to dosing of her diuretics.  EKG reviewed.  She has slight prolongation of QT.  We will discontinue Zofran and Phenergan and use Compazine as needed for nausea.  Prescription has been sent to her pharmacy  Refill Xanax  All questions answered  Continue to follow-up with PCP per routine  Follow-up with pain management  per routine  Follow-up in 4 weeks or earlier as needed      I spent 40 total minutes, face-to-face, caring for Hope today. 90% of this time involved counseling and/or coordination of care as documented within this note.

## 2023-08-14 NOTE — Clinical Note
Please send to Alida Latham    Patients insurance is not covering her insuliin or [employer]bre [employer]. Patient has no way to check her bs and she is out of insulin.   please advise.

## 2023-08-15 ENCOUNTER — OFFICE VISIT (OUTPATIENT)
Dept: ONCOLOGY | Facility: CLINIC | Age: 48
End: 2023-08-15
Payer: MEDICARE

## 2023-08-15 ENCOUNTER — LAB (OUTPATIENT)
Dept: LAB | Facility: HOSPITAL | Age: 48
End: 2023-08-15
Payer: MEDICAID

## 2023-08-15 ENCOUNTER — SPECIALTY PHARMACY (OUTPATIENT)
Dept: PHARMACY | Facility: HOSPITAL | Age: 48
End: 2023-08-15
Payer: MEDICAID

## 2023-08-15 ENCOUNTER — HOME CARE VISIT (OUTPATIENT)
Dept: HOME HEALTH SERVICES | Facility: HOME HEALTHCARE | Age: 48
End: 2023-08-15
Payer: COMMERCIAL

## 2023-08-15 ENCOUNTER — HOME CARE VISIT (OUTPATIENT)
Dept: HOME HEALTH SERVICES | Facility: HOME HEALTHCARE | Age: 48
End: 2023-08-15
Payer: MEDICAID

## 2023-08-15 VITALS
HEART RATE: 123 BPM | RESPIRATION RATE: 18 BRPM | DIASTOLIC BLOOD PRESSURE: 82 MMHG | SYSTOLIC BLOOD PRESSURE: 124 MMHG | OXYGEN SATURATION: 99 %

## 2023-08-15 VITALS
OXYGEN SATURATION: 98 % | SYSTOLIC BLOOD PRESSURE: 130 MMHG | HEART RATE: 101 BPM | TEMPERATURE: 96.9 F | DIASTOLIC BLOOD PRESSURE: 82 MMHG

## 2023-08-15 DIAGNOSIS — C92.10 CML (CHRONIC MYELOCYTIC LEUKEMIA): Primary | ICD-10-CM

## 2023-08-15 DIAGNOSIS — C92.10 CML (CHRONIC MYELOCYTIC LEUKEMIA): ICD-10-CM

## 2023-08-15 LAB
ALBUMIN SERPL-MCNC: 3.4 G/DL (ref 3.5–5.2)
ALBUMIN/GLOB SERPL: 1 G/DL
ALP SERPL-CCNC: 574 U/L (ref 39–117)
ALT SERPL W P-5'-P-CCNC: 24 U/L (ref 1–33)
ANION GAP SERPL CALCULATED.3IONS-SCNC: 9 MMOL/L (ref 5–15)
AST SERPL-CCNC: 20 U/L (ref 1–32)
BASOPHILS # BLD AUTO: 0.07 10*3/MM3 (ref 0–0.2)
BASOPHILS NFR BLD AUTO: 0.4 % (ref 0–1.5)
BILIRUB SERPL-MCNC: 0.8 MG/DL (ref 0–1.2)
BUN SERPL-MCNC: 16 MG/DL (ref 6–20)
BUN/CREAT SERPL: 28.1 (ref 7–25)
CALCIUM SPEC-SCNC: 9.5 MG/DL (ref 8.6–10.5)
CHLORIDE SERPL-SCNC: 101 MMOL/L (ref 98–107)
CO2 SERPL-SCNC: 28 MMOL/L (ref 22–29)
CREAT SERPL-MCNC: 0.57 MG/DL (ref 0.57–1)
DEPRECATED RDW RBC AUTO: 48.2 FL (ref 37–54)
EGFRCR SERPLBLD CKD-EPI 2021: 113 ML/MIN/1.73
EOSINOPHIL # BLD AUTO: 0.14 10*3/MM3 (ref 0–0.4)
EOSINOPHIL NFR BLD AUTO: 0.8 % (ref 0.3–6.2)
ERYTHROCYTE [DISTWIDTH] IN BLOOD BY AUTOMATED COUNT: 15.1 % (ref 12.3–15.4)
GLOBULIN UR ELPH-MCNC: 3.4 GM/DL
GLUCOSE SERPL-MCNC: 264 MG/DL (ref 65–99)
HCT VFR BLD AUTO: 26.8 % (ref 34–46.6)
HGB BLD-MCNC: 8.5 G/DL (ref 12–15.9)
LAB AP CASE REPORT: NORMAL
LDH SERPL-CCNC: 212 U/L (ref 135–214)
LYMPHOCYTES # BLD AUTO: 1.87 10*3/MM3 (ref 0.7–3.1)
LYMPHOCYTES NFR BLD AUTO: 11.2 % (ref 19.6–45.3)
MCH RBC QN AUTO: 28.9 PG (ref 26.6–33)
MCHC RBC AUTO-ENTMCNC: 31.7 G/DL (ref 31.5–35.7)
MCV RBC AUTO: 91.2 FL (ref 79–97)
MONOCYTES # BLD AUTO: 2.02 10*3/MM3 (ref 0.1–0.9)
MONOCYTES NFR BLD AUTO: 12.1 % (ref 5–12)
NEUTROPHILS NFR BLD AUTO: 12.6 10*3/MM3 (ref 1.7–7)
NEUTROPHILS NFR BLD AUTO: 75.5 % (ref 42.7–76)
PATH REPORT.FINAL DX SPEC: NORMAL
PLATELET # BLD AUTO: 99 10*3/MM3 (ref 140–450)
PMV BLD AUTO: 8.8 FL (ref 6–12)
POTASSIUM SERPL-SCNC: 3.9 MMOL/L (ref 3.5–5.2)
PROT SERPL-MCNC: 6.8 G/DL (ref 6–8.5)
RBC # BLD AUTO: 2.94 10*6/MM3 (ref 3.77–5.28)
SODIUM SERPL-SCNC: 138 MMOL/L (ref 136–145)
URATE SERPL-MCNC: 5 MG/DL (ref 2.4–5.7)
WBC NRBC COR # BLD: 16.7 10*3/MM3 (ref 3.4–10.8)

## 2023-08-15 PROCEDURE — 84550 ASSAY OF BLOOD/URIC ACID: CPT | Performed by: INTERNAL MEDICINE

## 2023-08-15 PROCEDURE — 80053 COMPREHEN METABOLIC PANEL: CPT | Performed by: INTERNAL MEDICINE

## 2023-08-15 PROCEDURE — 85025 COMPLETE CBC W/AUTO DIFF WBC: CPT

## 2023-08-15 PROCEDURE — 36415 COLL VENOUS BLD VENIPUNCTURE: CPT

## 2023-08-15 PROCEDURE — 83615 LACTATE (LD) (LDH) ENZYME: CPT | Performed by: INTERNAL MEDICINE

## 2023-08-15 NOTE — PROGRESS NOTES
Re: Refills of Oral Specialty Medication - Iclusig (ponatinib)    Drug-Drug Interactions: The current medication list was reviewed and there are no relevant drug-drug interactions.  Medication Allergies: The patient has NKDA  Review of Labs/Dose Adjustments: DOSE CHANGE - I reviewed the most recent note and labs. Due to myelosuppression the dose is being reduced further. Recommended 10 mg daily; however, per Dr. Lerma, pt does not feel comfortable starting back at minimum recommended dosage per PI and would like to restart 10 mg every other day. I sent refills as described below.    Drug: Iclusig (ponatinib)  Strength: 10 mg  Directions: Take 1 tablet by mouth  every other day  Quantity: 15  Refills: 1  Pharmacy prescription sent to: [facility] upon MD bernard Aldridge, Pharm.D.    8/15/2023  14:41 EDT

## 2023-08-15 NOTE — HOME HEALTH
Routine Visit Note:   Patient was in the living room and said come in.   Patient rated her pain as 6/10.    Skill/education provided: Patient was instructed in therapeutic exercises, safe transfers and ambulation with an appropriate assist device.    Patient/caregiver response: Patient/caregiver understood instructions and performed exercises well.    Plan for next visit:  Advance gait and strengthening exercise as patient pain and tolerance allows.

## 2023-08-16 ENCOUNTER — SPECIALTY PHARMACY (OUTPATIENT)
Dept: PHARMACY | Facility: HOSPITAL | Age: 48
End: 2023-08-16
Payer: MEDICAID

## 2023-08-16 DIAGNOSIS — C92.10 BLAST CRISIS PHASE OF CHRONIC MYELOID LEUKEMIA: ICD-10-CM

## 2023-08-16 DIAGNOSIS — C92.10 CML (CHRONIC MYELOCYTIC LEUKEMIA): Primary | ICD-10-CM

## 2023-08-16 NOTE — PROGRESS NOTES
Specialty Pharmacy Note: Iclusig (ponatinib)    Nieves Mc is a 47 y.o. female with chronic myelocytic leukemia was seen 8/15/23 by Dr. Lerma. Per provider dictation, plan to resume oral oncology regimen Iclusig (ponatinib) at reduced dose due to myelosuppression on previous dose.  Labs Review: The CMP and CBC from 8/15/23 have been reviewed.  New dose of Iclusig (ponatinib) will be 10mg PO every other day.    Specialty pharmacy will continue to follow patient.    Ros CALHOUN, PharmD   8/16/2023  10:49 EDT

## 2023-08-16 NOTE — PROGRESS NOTES
Specialty Pharmacy Note: Iclusig (ponatinib)    Drug: Iclusig (ponatinib)  Strength: 10 mg  Directions: Take 1 tablet by mouth every other day  Quantity: 15  Refills: 1    Released to pharmacy: awaiting MD signature    Completed independent double check on medication dose adjustment order/RX.      Thanks,    Ros CALHOUN, PharmD

## 2023-08-17 ENCOUNTER — SPECIALTY PHARMACY (OUTPATIENT)
Dept: PHARMACY | Facility: HOSPITAL | Age: 48
End: 2023-08-17
Payer: MEDICAID

## 2023-08-17 DIAGNOSIS — C92.10 CML (CHRONIC MYELOCYTIC LEUKEMIA): Primary | ICD-10-CM

## 2023-08-17 DIAGNOSIS — C92.10 BLAST CRISIS PHASE OF CHRONIC MYELOID LEUKEMIA: ICD-10-CM

## 2023-08-17 RX ORDER — PONATINIB HYDROCHLORIDE 10 MG/1
10 TABLET, FILM COATED ORAL EVERY OTHER DAY
Qty: 15 TABLET | Refills: 1 | Status: SHIPPED | OUTPATIENT
Start: 2023-08-17 | End: 2023-08-17 | Stop reason: SDUPTHER

## 2023-08-21 ENCOUNTER — HOME CARE VISIT (OUTPATIENT)
Dept: HOME HEALTH SERVICES | Facility: HOME HEALTHCARE | Age: 48
End: 2023-08-21
Payer: MEDICAID

## 2023-08-21 ENCOUNTER — HOME CARE VISIT (OUTPATIENT)
Dept: HOME HEALTH SERVICES | Facility: HOME HEALTHCARE | Age: 48
End: 2023-08-21
Payer: COMMERCIAL

## 2023-08-21 ENCOUNTER — LAB REQUISITION (OUTPATIENT)
Dept: LAB | Facility: HOSPITAL | Age: 48
End: 2023-08-21
Payer: MEDICARE

## 2023-08-21 VITALS
TEMPERATURE: 98.4 F | DIASTOLIC BLOOD PRESSURE: 66 MMHG | SYSTOLIC BLOOD PRESSURE: 152 MMHG | OXYGEN SATURATION: 96 % | HEART RATE: 107 BPM | RESPIRATION RATE: 18 BRPM

## 2023-08-21 DIAGNOSIS — C92.10 CHRONIC MYELOID LEUKEMIA, BCR/ABL-POSITIVE, NOT HAVING ACHIEVED REMISSION: ICD-10-CM

## 2023-08-21 LAB
ANISOCYTOSIS BLD QL: ABNORMAL
BLASTS NFR BLD MANUAL: 1 % (ref 0–0)
DEPRECATED RDW RBC AUTO: 49.4 FL (ref 37–54)
EOSINOPHIL # BLD MANUAL: 0.16 10*3/MM3 (ref 0–0.4)
EOSINOPHIL NFR BLD MANUAL: 1 % (ref 0.3–6.2)
ERYTHROCYTE [DISTWIDTH] IN BLOOD BY AUTOMATED COUNT: 15.7 % (ref 12.3–15.4)
HCT VFR BLD AUTO: 25.4 % (ref 34–46.6)
HGB BLD-MCNC: 8.1 G/DL (ref 12–15.9)
LYMPHOCYTES # BLD MANUAL: 1.97 10*3/MM3 (ref 0.7–3.1)
LYMPHOCYTES NFR BLD MANUAL: 1 % (ref 5–12)
MCH RBC QN AUTO: 27.2 PG (ref 26.6–33)
MCHC RBC AUTO-ENTMCNC: 31.9 G/DL (ref 31.5–35.7)
MCV RBC AUTO: 85.1 FL (ref 79–97)
METAMYELOCYTES NFR BLD MANUAL: 10 % (ref 0–0)
MONOCYTES # BLD: 0.16 10*3/MM3 (ref 0.1–0.9)
MYELOCYTES NFR BLD MANUAL: 5 % (ref 0–0)
NEUTROPHILS # BLD AUTO: 11.48 10*3/MM3 (ref 1.7–7)
NEUTROPHILS NFR BLD MANUAL: 62 % (ref 42.7–76)
NEUTS BAND NFR BLD MANUAL: 8 % (ref 0–5)
PLATELET # BLD AUTO: 116 10*3/MM3 (ref 140–450)
PMV BLD AUTO: 7 FL (ref 6–12)
POLYCHROMASIA BLD QL SMEAR: ABNORMAL
RBC # BLD AUTO: 2.98 10*6/MM3 (ref 3.77–5.28)
SCAN SLIDE: NORMAL
SMALL PLATELETS BLD QL SMEAR: ABNORMAL
VARIANT LYMPHS NFR BLD MANUAL: 12 % (ref 19.6–45.3)
WBC MORPH BLD: NORMAL
WBC NRBC COR # BLD: 16.4 10*3/MM3 (ref 3.4–10.8)

## 2023-08-21 PROCEDURE — G0299 HHS/HOSPICE OF RN EA 15 MIN: HCPCS

## 2023-08-21 PROCEDURE — G0157 HHC PT ASSISTANT EA 15: HCPCS

## 2023-08-21 PROCEDURE — 85025 COMPLETE CBC W/AUTO DIFF WBC: CPT | Performed by: INTERNAL MEDICINE

## 2023-08-22 VITALS
DIASTOLIC BLOOD PRESSURE: 82 MMHG | OXYGEN SATURATION: 96 % | TEMPERATURE: 96.9 F | SYSTOLIC BLOOD PRESSURE: 134 MMHG | HEART RATE: 91 BPM

## 2023-08-22 NOTE — HOME HEALTH
Routine Visit Note:    Skill/education provided: cardiopulmonary assessment, med education, pain assessment, fall prevention education, labs per md order    Patient/caregiver response: pt stated understanding    Plan for next visit: cardiopulmonary assessment, med education, pain assessment, fall prevention education, labs per md order    Other pertinent info: patient was restarted on her chemo medication today.

## 2023-08-22 NOTE — HOME HEALTH
Routine Visit Note:   Patient was in the living room and said come in.   Patient rated her pain as 9/10.    Skill/education provided: Patient was instructed in therapeutic exercises, safe transfers and ambulation with an appropriate assist device.    Patient/caregiver response: Patient/caregiver understood instructions and performed exercises well.    Plan for next visit:  Advance gait and strengthening exercise as patient pain and tolerance allows.

## 2023-08-23 ENCOUNTER — HOME CARE VISIT (OUTPATIENT)
Dept: HOME HEALTH SERVICES | Facility: HOME HEALTHCARE | Age: 48
End: 2023-08-23
Payer: COMMERCIAL

## 2023-08-23 ENCOUNTER — DOCUMENTATION (OUTPATIENT)
Dept: PHARMACY | Facility: HOSPITAL | Age: 48
End: 2023-08-23
Payer: MEDICAID

## 2023-08-23 ENCOUNTER — SPECIALTY PHARMACY (OUTPATIENT)
Dept: PHARMACY | Facility: HOSPITAL | Age: 48
End: 2023-08-23
Payer: MEDICAID

## 2023-08-23 NOTE — PROGRESS NOTES
Specialty Pharmacy Note     Called [facility] and talked to Vonnie about Hope delivery status for her Iclusig. Hope medication was delivered yesterday on 8/22/23 to her mom address.    Jyotsna Anton - CARE COORDINATOR  8/23/2023  09:33 EDT

## 2023-08-28 ENCOUNTER — LAB REQUISITION (OUTPATIENT)
Dept: LAB | Facility: HOSPITAL | Age: 48
End: 2023-08-28
Payer: MEDICARE

## 2023-08-28 ENCOUNTER — HOME CARE VISIT (OUTPATIENT)
Dept: HOME HEALTH SERVICES | Facility: HOME HEALTHCARE | Age: 48
End: 2023-08-28
Payer: MEDICAID

## 2023-08-28 ENCOUNTER — HOME CARE VISIT (OUTPATIENT)
Dept: HOME HEALTH SERVICES | Facility: HOME HEALTHCARE | Age: 48
End: 2023-08-28
Payer: COMMERCIAL

## 2023-08-28 VITALS
OXYGEN SATURATION: 93 % | HEART RATE: 100 BPM | SYSTOLIC BLOOD PRESSURE: 138 MMHG | DIASTOLIC BLOOD PRESSURE: 62 MMHG | RESPIRATION RATE: 18 BRPM | TEMPERATURE: 98.2 F

## 2023-08-28 DIAGNOSIS — C92.10 CHRONIC MYELOID LEUKEMIA, BCR/ABL-POSITIVE, NOT HAVING ACHIEVED REMISSION: ICD-10-CM

## 2023-08-28 LAB
ANISOCYTOSIS BLD QL: ABNORMAL
BASOPHILS # BLD MANUAL: 0.13 10*3/MM3 (ref 0–0.2)
BASOPHILS NFR BLD MANUAL: 1 % (ref 0–1.5)
DEPRECATED RDW RBC AUTO: 49.9 FL (ref 37–54)
ERYTHROCYTE [DISTWIDTH] IN BLOOD BY AUTOMATED COUNT: 16.5 % (ref 12.3–15.4)
HCT VFR BLD AUTO: 28.1 % (ref 34–46.6)
HGB BLD-MCNC: 9 G/DL (ref 12–15.9)
LYMPHOCYTES # BLD MANUAL: 2.05 10*3/MM3 (ref 0.7–3.1)
LYMPHOCYTES NFR BLD MANUAL: 3 % (ref 5–12)
MCH RBC QN AUTO: 27.9 PG (ref 26.6–33)
MCHC RBC AUTO-ENTMCNC: 32.1 G/DL (ref 31.5–35.7)
MCV RBC AUTO: 86.9 FL (ref 79–97)
METAMYELOCYTES NFR BLD MANUAL: 4 % (ref 0–0)
MONOCYTES # BLD: 0.38 10*3/MM3 (ref 0.1–0.9)
MYELOCYTES NFR BLD MANUAL: 2 % (ref 0–0)
NEUTROPHILS # BLD AUTO: 9.47 10*3/MM3 (ref 1.7–7)
NEUTROPHILS NFR BLD MANUAL: 66 % (ref 42.7–76)
NEUTS BAND NFR BLD MANUAL: 8 % (ref 0–5)
PLATELET # BLD AUTO: 105 10*3/MM3 (ref 140–450)
PMV BLD AUTO: 7.6 FL (ref 6–12)
POLYCHROMASIA BLD QL SMEAR: ABNORMAL
RBC # BLD AUTO: 3.23 10*6/MM3 (ref 3.77–5.28)
SCAN SLIDE: NORMAL
SMALL PLATELETS BLD QL SMEAR: ABNORMAL
VARIANT LYMPHS NFR BLD MANUAL: 16 % (ref 19.6–45.3)
WBC MORPH BLD: NORMAL
WBC NRBC COR # BLD: 12.8 10*3/MM3 (ref 3.4–10.8)

## 2023-08-28 PROCEDURE — 85025 COMPLETE CBC W/AUTO DIFF WBC: CPT | Performed by: INTERNAL MEDICINE

## 2023-08-28 PROCEDURE — G0299 HHS/HOSPICE OF RN EA 15 MIN: HCPCS

## 2023-08-28 NOTE — HOME HEALTH
Routine Visit Note:    Skill/education provided: cardiopulmonary assessment, med education, pain assessment, fall prevention education, labs per md order    Patient/caregiver response: pt stated understanding    Plan for next visit: cardiopulmonary assessment, med education, pain assessment, fall prevention education, labs per md order      Other pertinent info: monitor for falls

## 2023-08-29 ENCOUNTER — HOME CARE VISIT (OUTPATIENT)
Dept: HOME HEALTH SERVICES | Facility: HOME HEALTHCARE | Age: 48
End: 2023-08-29
Payer: COMMERCIAL

## 2023-08-30 ENCOUNTER — SPECIALTY PHARMACY (OUTPATIENT)
Dept: PHARMACY | Facility: HOSPITAL | Age: 48
End: 2023-08-30
Payer: MEDICAID

## 2023-08-30 LAB — BCR ABL RESULT: NORMAL

## 2023-08-31 ENCOUNTER — HOME CARE VISIT (OUTPATIENT)
Dept: HOME HEALTH SERVICES | Facility: HOME HEALTHCARE | Age: 48
End: 2023-08-31
Payer: COMMERCIAL

## 2023-09-01 ENCOUNTER — HOME CARE VISIT (OUTPATIENT)
Dept: HOME HEALTH SERVICES | Facility: HOME HEALTHCARE | Age: 48
End: 2023-09-01
Payer: COMMERCIAL

## 2023-09-05 ENCOUNTER — HOME CARE VISIT (OUTPATIENT)
Dept: HOME HEALTH SERVICES | Facility: HOME HEALTHCARE | Age: 48
End: 2023-09-05
Payer: MEDICAID

## 2023-09-05 ENCOUNTER — LAB REQUISITION (OUTPATIENT)
Dept: LAB | Facility: HOSPITAL | Age: 48
End: 2023-09-05
Payer: MEDICARE

## 2023-09-05 ENCOUNTER — HOME CARE VISIT (OUTPATIENT)
Dept: HOME HEALTH SERVICES | Facility: HOME HEALTHCARE | Age: 48
End: 2023-09-05
Payer: COMMERCIAL

## 2023-09-05 VITALS
DIASTOLIC BLOOD PRESSURE: 80 MMHG | HEART RATE: 116 BPM | OXYGEN SATURATION: 98 % | TEMPERATURE: 98.2 F | SYSTOLIC BLOOD PRESSURE: 162 MMHG | RESPIRATION RATE: 18 BRPM

## 2023-09-05 DIAGNOSIS — C92.10 CHRONIC MYELOID LEUKEMIA, BCR/ABL-POSITIVE, NOT HAVING ACHIEVED REMISSION: ICD-10-CM

## 2023-09-05 LAB
BASOPHILS # BLD AUTO: 0.1 10*3/MM3 (ref 0–0.2)
BASOPHILS NFR BLD AUTO: 0.7 % (ref 0–1.5)
DEPRECATED RDW RBC AUTO: 52.5 FL (ref 37–54)
EOSINOPHIL # BLD AUTO: 0.1 10*3/MM3 (ref 0–0.4)
EOSINOPHIL NFR BLD AUTO: 1.1 % (ref 0.3–6.2)
ERYTHROCYTE [DISTWIDTH] IN BLOOD BY AUTOMATED COUNT: 17.3 % (ref 12.3–15.4)
HCT VFR BLD AUTO: 28.7 % (ref 34–46.6)
HGB BLD-MCNC: 9.2 G/DL (ref 12–15.9)
LYMPHOCYTES # BLD AUTO: 3 10*3/MM3 (ref 0.7–3.1)
LYMPHOCYTES NFR BLD AUTO: 26.4 % (ref 19.6–45.3)
MCH RBC QN AUTO: 27.9 PG (ref 26.6–33)
MCHC RBC AUTO-ENTMCNC: 32 G/DL (ref 31.5–35.7)
MCV RBC AUTO: 87 FL (ref 79–97)
MONOCYTES # BLD AUTO: 0.9 10*3/MM3 (ref 0.1–0.9)
MONOCYTES NFR BLD AUTO: 7.9 % (ref 5–12)
NEUTROPHILS NFR BLD AUTO: 63.9 % (ref 42.7–76)
NEUTROPHILS NFR BLD AUTO: 7.4 10*3/MM3 (ref 1.7–7)
NRBC BLD AUTO-RTO: 0 /100 WBC (ref 0–0.2)
PLATELET # BLD AUTO: 86 10*3/MM3 (ref 140–450)
PMV BLD AUTO: 7.2 FL (ref 6–12)
RBC # BLD AUTO: 3.31 10*6/MM3 (ref 3.77–5.28)
WBC NRBC COR # BLD: 11.5 10*3/MM3 (ref 3.4–10.8)

## 2023-09-05 PROCEDURE — 85025 COMPLETE CBC W/AUTO DIFF WBC: CPT | Performed by: INTERNAL MEDICINE

## 2023-09-05 PROCEDURE — G0299 HHS/HOSPICE OF RN EA 15 MIN: HCPCS

## 2023-09-06 ENCOUNTER — HOME CARE VISIT (OUTPATIENT)
Dept: HOME HEALTH SERVICES | Facility: HOME HEALTHCARE | Age: 48
End: 2023-09-06
Payer: COMMERCIAL

## 2023-09-06 ENCOUNTER — SPECIALTY PHARMACY (OUTPATIENT)
Dept: PHARMACY | Facility: HOSPITAL | Age: 48
End: 2023-09-06
Payer: MEDICAID

## 2023-09-06 NOTE — PROGRESS NOTES
Specialty Pharmacy Note: Iclusig (ponatinib)    Spoke with pt via phone. She restarted Iclusig 10 mg every other day about 2 weeks ago and is currently taking on Monday, Wednesday, and Friday of each week. She states she has noticed more fatigue and loss of appetite since restarting. She also reports feeling of ears popping as well. Despite SE noted above, she is mostly worried about her decreased platelet count which was at 116,000/mm3 when first restarting and now down to 86,000/mm3. Discussed decrease is expected, but we want to ensure she remains stable and level does not decrease too low. She denies s/sx of bleeding, but is still concerned platelet count will continue to decrease. Relayed information to provider of pt concerns.      Thank you,  Amy Aldridge, Pharm.D.

## 2023-09-06 NOTE — HOME HEALTH
Routine Visit Note:    Skill/education provided: cardiopulmonary assessment, med education, pain assessment, fall prevention education, labs per md order    Patient/caregiver response:pt stated understanding    Plan for next visit: cardiopullmonary assessment, med education, pain assessment, fall prevention education, labs per md order     Other pertinent info: monitor for falls

## 2023-09-11 ENCOUNTER — LAB REQUISITION (OUTPATIENT)
Dept: LAB | Facility: HOSPITAL | Age: 48
End: 2023-09-11
Payer: MEDICARE

## 2023-09-11 ENCOUNTER — HOME CARE VISIT (OUTPATIENT)
Dept: HOME HEALTH SERVICES | Facility: HOME HEALTHCARE | Age: 48
End: 2023-09-11
Payer: COMMERCIAL

## 2023-09-11 ENCOUNTER — SPECIALTY PHARMACY (OUTPATIENT)
Dept: PHARMACY | Facility: HOSPITAL | Age: 48
End: 2023-09-11
Payer: MEDICAID

## 2023-09-11 ENCOUNTER — HOME CARE VISIT (OUTPATIENT)
Dept: HOME HEALTH SERVICES | Facility: HOME HEALTHCARE | Age: 48
End: 2023-09-11
Payer: MEDICAID

## 2023-09-11 ENCOUNTER — TELEPHONE (OUTPATIENT)
Dept: ONCOLOGY | Facility: CLINIC | Age: 48
End: 2023-09-11
Payer: MEDICAID

## 2023-09-11 VITALS
HEART RATE: 99 BPM | BODY MASS INDEX: 15.79 KG/M2 | SYSTOLIC BLOOD PRESSURE: 134 MMHG | RESPIRATION RATE: 18 BRPM | OXYGEN SATURATION: 97 % | WEIGHT: 92 LBS | DIASTOLIC BLOOD PRESSURE: 64 MMHG | TEMPERATURE: 98.8 F

## 2023-09-11 DIAGNOSIS — F41.9 ANXIETY: ICD-10-CM

## 2023-09-11 DIAGNOSIS — C92.10 CHRONIC MYELOID LEUKEMIA, BCR/ABL-POSITIVE, NOT HAVING ACHIEVED REMISSION: ICD-10-CM

## 2023-09-11 DIAGNOSIS — C92.10 CML (CHRONIC MYELOCYTIC LEUKEMIA): ICD-10-CM

## 2023-09-11 LAB
ANISOCYTOSIS BLD QL: ABNORMAL
BLASTS NFR BLD MANUAL: 1 % (ref 0–0)
DEPRECATED RDW RBC AUTO: 59.9 FL (ref 37–54)
EOSINOPHIL # BLD MANUAL: 0.06 10*3/MM3 (ref 0–0.4)
EOSINOPHIL NFR BLD MANUAL: 1 % (ref 0.3–6.2)
ERYTHROCYTE [DISTWIDTH] IN BLOOD BY AUTOMATED COUNT: 18.7 % (ref 12.3–15.4)
HCT VFR BLD AUTO: 24.7 % (ref 34–46.6)
HGB BLD-MCNC: 7.9 G/DL (ref 12–15.9)
LYMPHOCYTES # BLD MANUAL: 2.22 10*3/MM3 (ref 0.7–3.1)
LYMPHOCYTES NFR BLD MANUAL: 2 % (ref 5–12)
MCH RBC QN AUTO: 27.6 PG (ref 26.6–33)
MCHC RBC AUTO-ENTMCNC: 31.8 G/DL (ref 31.5–35.7)
MCV RBC AUTO: 86.6 FL (ref 79–97)
METAMYELOCYTES NFR BLD MANUAL: 1 % (ref 0–0)
MONOCYTES # BLD: 0.11 10*3/MM3 (ref 0.1–0.9)
MYELOCYTES NFR BLD MANUAL: 1 % (ref 0–0)
NEUTROPHILS # BLD AUTO: 3.14 10*3/MM3 (ref 1.7–7)
NEUTROPHILS NFR BLD MANUAL: 54 % (ref 42.7–76)
NEUTS BAND NFR BLD MANUAL: 1 % (ref 0–5)
NRBC SPEC MANUAL: 1 /100 WBC (ref 0–0.2)
PLAT MORPH BLD: NORMAL
PLATELET # BLD AUTO: 43 10*3/MM3 (ref 140–450)
PMV BLD AUTO: 7 FL (ref 6–12)
RBC # BLD AUTO: 2.86 10*6/MM3 (ref 3.77–5.28)
SCAN SLIDE: NORMAL
VARIANT LYMPHS NFR BLD MANUAL: 39 % (ref 19.6–45.3)
WBC MORPH BLD: NORMAL
WBC NRBC COR # BLD: 5.7 10*3/MM3 (ref 3.4–10.8)

## 2023-09-11 PROCEDURE — G0299 HHS/HOSPICE OF RN EA 15 MIN: HCPCS

## 2023-09-11 PROCEDURE — 85025 COMPLETE CBC W/AUTO DIFF WBC: CPT | Performed by: INTERNAL MEDICINE

## 2023-09-11 NOTE — TELEPHONE ENCOUNTER
Received a call from the pt asking what was going to be done about her low platelets. I told her that her platelets are not low enough for a transfusion at this time and this would be discussed at her follow-up with Dr. Lerma tomorrow. Pt stated that she was not going to be able to make her appt tomorrow due to transportation issues. I told her I would discuss with Dr. Lerma and let her know if there are any further recommendations. Pt also asked for a refill of her Xanax. I told her I would send a refill request.     Discussed with Dr. Lerma who advised that pt hold Ponatinib. Pharmacist, Ros, discussed with pt.

## 2023-09-11 NOTE — HOME HEALTH
Routine Visit Note:    Skill/education provided: Cardiopulmonary assessment,med education, med education, pain assessment, fall prevention education, labs per md order    Patient/caregiver response: pt stated understanding    Plan for next visit: Cardiopulmonary assessment,med education, med education, pain assessment, fall prevention education, labs per md order    Other pertinent info: monitor for falls or increase in pain

## 2023-09-11 NOTE — Clinical Note
Patient has lost her primary care physician. She does not have her insulin rx renewed and no way of getting her insulin. Would you be willing to sign her home health care orders until she is able to find another PCP?

## 2023-09-11 NOTE — PROGRESS NOTES
Specialty Pharmacy Note: Iclusig (ponatinib)--ON HOLD    Contacted patient via telephone to hold Iclusig (ponatinib) due to platelet count below 50,000.  Hope answered the phone and expressed understanding.  She also states she has body aches and constipation from the medication and was expecting a prescription to be called in for the constipation but hasn't received one.  She is also requesting refill of Xanax.  She is unable to come into the office at this time due to her daughter's car being in the shop and not having a ride.  She said she would contact office to reschedule once she can get a ride.  Medication requests sent to RN.      Thanks,    Ros CALHOUN, PharmD

## 2023-09-11 NOTE — TELEPHONE ENCOUNTER
Received a call from home health nurse reporting platelet level of 43. Pt has appt with Dr. Lerma tomorrow.

## 2023-09-13 ENCOUNTER — HOME CARE VISIT (OUTPATIENT)
Dept: HOME HEALTH SERVICES | Facility: HOME HEALTHCARE | Age: 48
End: 2023-09-13
Payer: COMMERCIAL

## 2023-09-14 ENCOUNTER — HOME CARE VISIT (OUTPATIENT)
Dept: HOME HEALTH SERVICES | Facility: HOME HEALTHCARE | Age: 48
End: 2023-09-14
Payer: COMMERCIAL

## 2023-09-14 RX ORDER — ALPRAZOLAM 0.5 MG/1
0.5 TABLET ORAL NIGHTLY PRN
Qty: 30 TABLET | Refills: 0 | Status: SHIPPED | OUTPATIENT
Start: 2023-09-14

## 2023-09-18 ENCOUNTER — HOME CARE VISIT (OUTPATIENT)
Dept: HOME HEALTH SERVICES | Facility: HOME HEALTHCARE | Age: 48
End: 2023-09-18
Payer: COMMERCIAL

## 2023-09-18 ENCOUNTER — LAB REQUISITION (OUTPATIENT)
Dept: LAB | Facility: HOSPITAL | Age: 48
End: 2023-09-18
Payer: MEDICARE

## 2023-09-18 DIAGNOSIS — C92.10 CHRONIC MYELOID LEUKEMIA, BCR/ABL-POSITIVE, NOT HAVING ACHIEVED REMISSION: ICD-10-CM

## 2023-09-18 LAB
BASOPHILS # BLD MANUAL: 0.09 10*3/MM3 (ref 0–0.2)
BASOPHILS NFR BLD MANUAL: 1 % (ref 0–1.5)
BLASTS NFR BLD MANUAL: 4 % (ref 0–0)
DEPRECATED RDW RBC AUTO: 60.8 FL (ref 37–54)
ERYTHROCYTE [DISTWIDTH] IN BLOOD BY AUTOMATED COUNT: 18.9 % (ref 12.3–15.4)
HCT VFR BLD AUTO: 25.4 % (ref 34–46.6)
HGB BLD-MCNC: 8.1 G/DL (ref 12–15.9)
LYMPHOCYTES # BLD MANUAL: 2.05 10*3/MM3 (ref 0.7–3.1)
LYMPHOCYTES NFR BLD MANUAL: 8 % (ref 5–12)
MCH RBC QN AUTO: 27.9 PG (ref 26.6–33)
MCHC RBC AUTO-ENTMCNC: 31.9 G/DL (ref 31.5–35.7)
MCV RBC AUTO: 87.7 FL (ref 79–97)
METAMYELOCYTES NFR BLD MANUAL: 1 % (ref 0–0)
MONOCYTES # BLD: 0.71 10*3/MM3 (ref 0.1–0.9)
MYELOCYTES NFR BLD MANUAL: 1 % (ref 0–0)
NEUTROPHILS # BLD AUTO: 5.52 10*3/MM3 (ref 1.7–7)
NEUTROPHILS NFR BLD MANUAL: 62 % (ref 42.7–76)
PLATELET # BLD AUTO: 113 10*3/MM3 (ref 140–450)
PMV BLD AUTO: 7.5 FL (ref 6–12)
RBC # BLD AUTO: 2.9 10*6/MM3 (ref 3.77–5.28)
RBC MORPH BLD: NORMAL
SCAN SLIDE: NORMAL
SMALL PLATELETS BLD QL SMEAR: ABNORMAL
VARIANT LYMPHS NFR BLD MANUAL: 2 % (ref 0–5)
VARIANT LYMPHS NFR BLD MANUAL: 21 % (ref 19.6–45.3)
WBC MORPH BLD: NORMAL
WBC NRBC COR # BLD: 8.9 10*3/MM3 (ref 3.4–10.8)

## 2023-09-18 PROCEDURE — G0299 HHS/HOSPICE OF RN EA 15 MIN: HCPCS

## 2023-09-18 PROCEDURE — 85025 COMPLETE CBC W/AUTO DIFF WBC: CPT | Performed by: INTERNAL MEDICINE

## 2023-09-19 ENCOUNTER — SPECIALTY PHARMACY (OUTPATIENT)
Dept: PHARMACY | Facility: HOSPITAL | Age: 48
End: 2023-09-19
Payer: MEDICAID

## 2023-09-19 NOTE — PROGRESS NOTES
Specialty Pharmacy Patient Management Program  Oncology 6-Month Clinical Assessment       Nieves Mc is a 47 y.o. female with Iclusig (ponatinib) seen today to assess adherence and side effects.    Reason for Outreach: Routine medication check-in .    Regimen: Iclusig 10 mg by mouth every other day    Specialty Pharmacy Goal   Goals Addressed Today        Specialty Pharmacy General Goal      Clinical goal/therapeutic target: stable or decreasing BCR-ABL transcript             Problem List   Problem list reviewed by Amy Aldridge RPH on 9/19/2023 at  4:23 PM  Patient Active Problem List   Diagnosis Code    Leukemia not having achieved remission C95.90    CML (chronic myelocytic leukemia) C92.10    Depression with anxiety F41.8    Right knee pain M25.561    Left leg pain M79.605    Normocytic hypochromic anemia D50.9    History of pulmonary embolism Z86.711    Medically noncompliant Z91.199    Visual changes H53.9    Type 2 diabetes mellitus with diabetic polyneuropathy, with long-term current use of insulin E11.42, Z79.4    Vitamin D deficiency E55.9    Shortness of breath R06.02    Dyspnea R06.00    Tachycardia R00.0    Moderate malnutrition E44.0    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    Acute diastolic CHF (congestive heart failure) I50.31    Menorrhagia N92.0    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    Acute respiratory failure with hypoxia J96.01    Thrombocytopenia D69.6    Hyperuricemia E79.0    NICM (nonischemic cardiomyopathy) I42.8    Dyspnea, unspecified type R06.00    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    Chronic pain G89.29    Chronic narcotic dependence F11.20    Acute respiratory failure with hypoxia J96.01    Dehydration E86.0    Vomiting R11.10    Chest pain, unspecified type R07.9    Bilateral lower extremity edema R60.0    Pancytopenia D61.818    Traumatic subdural hemorrhage without loss of consciousness S06.5X0A    Bilateral subdural hematomas S06.5XAA    Lumbar  radiculopathy M54.16    Hypomagnesemia E83.42    Right upper quadrant abdominal pain R10.11    History of migraine Z86.69    Fall during current hospitalization W19.XXXA, Y92.239    Pubic ramus fracture, left, closed, initial encounter S32.592A    Nausea R11.0    UTI (urinary tract infection) N39.0    Frequent falls R29.6    Chronic constipation K59.09    Diabetic gastroparesis E11.43, K31.84       Medication Assessment for Specialty Medication(s):  Medication Assessment  Follow Up Clinical Assessment  Medication(s) assessed: Iclusig  Therapeutic appropriateness: Appropriate  Medication tolerability: Patient reported serious adverse drug reaction  Common ADR(s) experienced: nausea and increased bone/joint pain  Serious ADR(s) experienced: thrombocytopenia; pt was in hospital due to low platelets and was recently restarted on medication at very low dose of 10 mg every other day (M, W, and Fri). Pt's platelets dropped again and medication held. Pt does not want to restart medication  Medication plan: Continue therapy with closer monitoring  Quality of Life Improvement Scale: Significantly worse  Comments on Quality of Life: Pt reports zero QOL while on medication. She reports constantly being anxious about her platelets despite checking a CBC weekly/close monitoring. She reports feeling nauseated and increase bone/joint pain despite having a pain pump. She does not want to restart medication. Provider and medical team made aware.  Administration:  Pt is taking every other day, but reports taking on M, Wed, and Friday only. Provider aware .   Patient can self administer medications: Yes  Medication Follow-Up Plan: Discuss patient concerns  with the patient's oncologist. Per Dr. Lerma, pt to restart her Iclusig (on hold for past week due to decrease in platelets); however, pt refusing to restart given she does not feeling well while taking medication and is constantly worrying about her platelets despite checking  weekly. Relayed information to Dr. Lerma and JP Denny. Pt to see Denisha this week to discuss.  Lab Review: The labs listed below have been reviewed. No dose adjustments are needed for the oral specialty medication(s) based on the labs. - Pt already taking less than minimum dose recommended per PI    Lab Results   Component Value Date    GLUCOSE 264 (H) 08/15/2023    CALCIUM 9.5 08/15/2023     08/15/2023    K 3.9 08/15/2023    CO2 28.0 08/15/2023     08/15/2023    BUN 16 08/15/2023    CREATININE 0.57 08/15/2023    EGFRIFNONA 133 02/02/2022    BCR 28.1 (H) 08/15/2023    ANIONGAP 9.0 08/15/2023     Lab Results   Component Value Date    WBC 8.90 09/18/2023    RBC 2.90 (L) 09/18/2023    HGB 8.1 (L) 09/18/2023    HCT 25.4 (L) 09/18/2023    MCV 87.7 09/18/2023    MCH 27.9 09/18/2023    MCHC 31.9 09/18/2023    RDW 18.9 (H) 09/18/2023    RDWSD 60.8 (H) 09/18/2023    MPV 7.5 09/18/2023     (L) 09/18/2023    NEUTRORELPCT 63.9 09/05/2023    LYMPHORELPCT 26.4 09/05/2023    MONORELPCT 7.9 09/05/2023    EOSRELPCT 1.1 09/05/2023    BASORELPCT 0.7 09/05/2023    NEUTROABS 5.52 09/18/2023    LYMPHSABS 3.00 09/05/2023    MONOSABS 0.90 09/05/2023    EOSABS 0.06 09/11/2023    BASOSABS 0.09 09/18/2023    NRBC 1.0 (H) 09/11/2023     Drug-drug interactions  Completed medication reconciliation today to assess for drug interactions. Patient denies starting or stopping any medications.  She does report not having a refill on her insulin due to losing her PCP. Relayed information to JP Denny as pt requested refill and pt will be seen by her tomorrow.  Assessed medication list for interactions, no significant drug interactions noted.   Advised patient to call the clinic if any new medications are started so we can assess for drug-drug interactions.  Drug-food interactions discussed: eating grapefruit and drinking grapefruit juice  Vaccines are coordinated by the patient's oncologist and primary care  provider.    Medications   Medicines reviewed by Amy Aldridge [facility] on 9/19/2023 at  4:22 PM  Prior to Admission medications    Medication Sig Start Date End Date Taking? Authorizing Provider   acetaminophen (TYLENOL) 325 MG tablet Take 2 tablets by mouth Every 4 (Four) Hours As Needed. Indications: Fever, Pain 11/17/22   Melecio Almanzar MD   ALPRAZolam (Xanax) 0.5 MG tablet Take 1 tablet by mouth At Night As Needed for Anxiety. Indications: Feeling Anxious 9/14/23   Meghann Lerma MD   budesonide-formoterol (SYMBICORT) 160-4.5 MCG/ACT inhaler Inhale 2 puffs 2 (Two) Times a Day. 8/5/22   Villa Patel MD   citalopram (CeleXA) 10 MG tablet Take 1 tablet by mouth Daily. Indications: Generalized Anxiety Disorder 4/26/23   Melecio Almanzar MD   Continuous Blood Gluc Sensor (FreeStyle Zahira 2 Sensor) misc  3/22/23   Melecio Almanzar MD   dapagliflozin Propanediol (Farxiga) 10 MG tablet Take 10 mg by mouth Daily. 3/10/23   Jane Hopper MD   furosemide (LASIX) 40 MG tablet Take 1 tablet by mouth Daily. Indications: Edema 12/19/22   Melecio Almanzar MD   gabapentin (NEURONTIN) 300 MG capsule Take 1 capsule by mouth 3 (Three) Times a Day. Indications: Diabetes with Nerve Disease 5/16/23   Melecio Almanzar MD   hydrOXYzine (ATARAX) 25 MG tablet Take 1 tablet by mouth Every 8 (Eight) Hours As Needed. Indications: Feeling Anxious 1/4/23   Melecio Almanzar MD   Insulin Glargine (BASAGLAR KWIKPEN) 100 UNIT/ML injection pen Inject 20 Units under the skin into the appropriate area as directed Daily.  Patient not taking: Reported on 9/19/2023 7/10/22   Anthony Herndon DO   metoclopramide (Reglan) 10 MG tablet Take 1 tablet by mouth 4 (Four) Times a Day Before Meals & at Bedtime. 7/9/23   Yuniel Yancey MD   metoclopramide (REGLAN) 10 MG tablet Take 10 mg by mouth 4 (Four) Times a Day. Indications: Indigestion 7/9/23   Melecio Almanzar MD   metoprolol succinate XL  (TOPROL-XL) 25 MG 24 hr tablet Take 1 tablet by mouth Daily. 3/10/23   Jane Hopper MD   midodrine (PROAMATINE) 10 MG tablet Take 1 tablet by mouth Every 8 (Eight) Hours. 5/31/23   Martha Shields DO   mirtazapine (REMERON) 15 MG tablet Take 1 tablet by mouth every night at bedtime. 3/24/23   Denisha Gill APRN   ondansetron ODT (ZOFRAN-ODT) 8 MG disintegrating tablet Place 1 tablet on the tongue Every 8 (Eight) Hours As Needed for Nausea or Vomiting. 5/17/23   Denisha Gill APRN   oxyCODONE (ROXICODONE) 10 MG tablet Take 1 tablet by mouth Every 6 (Six) Hours As Needed for Moderate Pain. Indications: Chronic Pain 7/3/23   Denisha Gill APRN   pain patient supplied pump 0.375 mL/hr by Intrathecal route Daily. Active pump  Prescriber: Dr. Shelton - pain management   Med name/ concentration: Dilaudid   Last refill: due soon  Lockout period: every 4 hours   Indications: chronic pain 5/17/23   Melecio Almanzar MD   PONATinib (ICLUSIG) 15 MG chemo tablet Take 15 mg by mouth Every Other Day. Indications: Chronic Myelogenous Leukemia 8/21/23   Melecio Almanzar MD   PONATinib HCl (Iclusig) 10 MG tablet Take 10 mg by mouth Every Other Day. Take with or without food. Swallow tablets whole, do not chew, crush, or dissovle 8/17/23   Meghann Lerma MD   tiZANidine (ZANAFLEX) 4 MG tablet Take 1 tablet by mouth Daily. Indications: Musculoskeletal Pain 5/10/23   Melecio Almanzar MD   traZODone (DESYREL) 50 MG tablet Take 25 mg by mouth every night at bedtime. Indications: Trouble Sleeping 4/26/23   Melecio Almanzar MD   ALPRAZolam (Xanax) 0.5 MG tablet Take 1 tablet by mouth At Night As Needed for Anxiety. Indications: Feeling Anxious 7/20/23 9/11/23  Meghann Lerma MD       Allergies  Known allergies and reactions were discussed with the patient. The patient's chart has been reviewed for allergy information and updated as necessary.   No Known Allergies       Allergies reviewed by Amy Aldridge [facility] on 9/19/2023 at  4:22 PM    Hospitalizations and Urgent Care Visits Since Last Assessment:  Unplanned hospitalizations or inpatient admissions: Yes  ED Visits: Yes  Urgent Office Visits: No    Adherence Assessment:  Adherence Questions  Medication(s) assessed: Iclusig  On average, how many doses/injections does the patient miss per month?: 0 (pt reports not missing any doses since restarting)  What is the estimated medication adherence level?: % (Per pt verbal report)  Based on the patient/caregiver response and refill history, does this patient require an MTP to track adherence improvements?: no    Quality of Life Assessment:  Quality of Life Assessment  Quality of Life Improvement Scale: Significantly worse  Comments on Quality of Life: Pt reports zero QOL while on medication. She reports constantly being anxious about her platelets despite checking a CBC weekly/close monitoring. She reports feeling nauseated and increase bone/joint pain despite having a pain pump. She does not want to restart medication. Provider and medical team made aware.  -- Quality of Life: 0/10    Financial Assessment:  Medication availability/affordability: Patient has had no issues obtaining medication from pharmacy.    Attestation:  I attest the patient was actively involved in and has agreed to the above plan of care. If the prescribed therapy is at any point deemed not appropriate based on the current or future assessments, a consultation will be initiated with the patient's specialty care provider to determine the best course of action. The revised plan of therapy will be documented along with any required assessments and/or additional patient education provided.       All questions addressed and patient had no additional concerns. Patient has pharmacy contact information.    Amy Aldridge, Pharm.D.    9/19/2023  16:52 EDT

## 2023-09-20 ENCOUNTER — HOME CARE VISIT (OUTPATIENT)
Dept: HOME HEALTH SERVICES | Facility: HOME HEALTHCARE | Age: 48
End: 2023-09-20

## 2023-09-20 VITALS
SYSTOLIC BLOOD PRESSURE: 160 MMHG | RESPIRATION RATE: 18 BRPM | DIASTOLIC BLOOD PRESSURE: 92 MMHG | HEART RATE: 110 BPM | OXYGEN SATURATION: 97 % | TEMPERATURE: 98.7 F

## 2023-09-25 ENCOUNTER — HOME CARE VISIT (OUTPATIENT)
Dept: HOME HEALTH SERVICES | Facility: HOME HEALTHCARE | Age: 48
End: 2023-09-25

## 2023-09-25 ENCOUNTER — LAB REQUISITION (OUTPATIENT)
Dept: LAB | Facility: HOSPITAL | Age: 48
End: 2023-09-25
Payer: MEDICARE

## 2023-09-25 VITALS
TEMPERATURE: 98.4 F | OXYGEN SATURATION: 92 % | HEART RATE: 114 BPM | SYSTOLIC BLOOD PRESSURE: 144 MMHG | RESPIRATION RATE: 18 BRPM | DIASTOLIC BLOOD PRESSURE: 66 MMHG

## 2023-09-25 DIAGNOSIS — C92.10 CHRONIC MYELOID LEUKEMIA, BCR/ABL-POSITIVE, NOT HAVING ACHIEVED REMISSION: ICD-10-CM

## 2023-09-25 LAB
BASOPHILS # BLD AUTO: 0 10*3/MM3 (ref 0–0.2)
BASOPHILS NFR BLD AUTO: 0.6 % (ref 0–1.5)
DEPRECATED RDW RBC AUTO: 58.6 FL (ref 37–54)
EOSINOPHIL # BLD AUTO: 0 10*3/MM3 (ref 0–0.4)
EOSINOPHIL NFR BLD AUTO: 0.2 % (ref 0.3–6.2)
ERYTHROCYTE [DISTWIDTH] IN BLOOD BY AUTOMATED COUNT: 18.2 % (ref 12.3–15.4)
HCT VFR BLD AUTO: 23.7 % (ref 34–46.6)
HGB BLD-MCNC: 7.7 G/DL (ref 12–15.9)
LYMPHOCYTES # BLD AUTO: 1.7 10*3/MM3 (ref 0.7–3.1)
LYMPHOCYTES NFR BLD AUTO: 33.5 % (ref 19.6–45.3)
MCH RBC QN AUTO: 28.2 PG (ref 26.6–33)
MCHC RBC AUTO-ENTMCNC: 32.3 G/DL (ref 31.5–35.7)
MCV RBC AUTO: 87.1 FL (ref 79–97)
MONOCYTES # BLD AUTO: 0.6 10*3/MM3 (ref 0.1–0.9)
MONOCYTES NFR BLD AUTO: 11.5 % (ref 5–12)
NEUTROPHILS NFR BLD AUTO: 2.8 10*3/MM3 (ref 1.7–7)
NEUTROPHILS NFR BLD AUTO: 54.2 % (ref 42.7–76)
NRBC BLD AUTO-RTO: 0.1 /100 WBC (ref 0–0.2)
PLATELET # BLD AUTO: 95 10*3/MM3 (ref 140–450)
PMV BLD AUTO: 7.3 FL (ref 6–12)
RBC # BLD AUTO: 2.73 10*6/MM3 (ref 3.77–5.28)
WBC NRBC COR # BLD: 5.1 10*3/MM3 (ref 3.4–10.8)

## 2023-09-25 PROCEDURE — G0299 HHS/HOSPICE OF RN EA 15 MIN: HCPCS

## 2023-09-25 PROCEDURE — 85025 COMPLETE CBC W/AUTO DIFF WBC: CPT | Performed by: INTERNAL MEDICINE

## 2023-09-25 NOTE — HOME HEALTH
60 Day Summary  Home Health need continues for: cardiopulmonary assessment,med education, labs per md order, pain assessment, fall prevention education  Primary diagnoses/co-morbidities/recent procedures in past 60 days that impact current episode: Chronic myeloid leukemia, BCR/ABL-positive, not having achieved remission     Current level of functional ability: assist of one  Homebound status and living arrangements: patients live with her . pt is homebound related to extended recovery time required to resume activity. Patient requires assist of one at all times for fall prevention and assists with movements.  Goals accomplished and/or measurable progress toward unmet goals in past 60 days: pt is able to walk 10 feet with assist of rollator   Focus of care for next 60 days for each discipline ordered: SN weekly for labs, cardiopulmonary assessment, med education, pain assessment, fall prevention education  Skin integrity/wound status: no wounds   Estimated date when home care services will end 11.28.23   SDOH changes/barriers (i.e. Caregiver availability, social isolation, environment, income, transportation access, food insecurity etc.. Pt is being set up with meal delivery  Need for MSW referral? not at this time.   Plan for next visit SN 1w8 for labs, cardiopulmonary assessment, fall prevention education, pain assessment

## 2023-09-26 ENCOUNTER — TELEPHONE (OUTPATIENT)
Dept: ONCOLOGY | Facility: CLINIC | Age: 48
End: 2023-09-26

## 2023-09-26 NOTE — TELEPHONE ENCOUNTER
Caller: Nieves Mc    Relationship to patient: Self    Best call back number: [phone]    Chief complaint: R/S    Type of visit: LAB AND F/U    Requested date: 9-27    If rescheduling, when is the original appointment: 9-20    Additional notes:HUB UNABLE TO R/S

## 2023-09-27 ENCOUNTER — HOME CARE VISIT (OUTPATIENT)
Dept: HOME HEALTH SERVICES | Facility: HOME HEALTHCARE | Age: 48
End: 2023-09-27
Payer: MEDICAID

## 2023-09-27 ENCOUNTER — OFFICE VISIT (OUTPATIENT)
Dept: ONCOLOGY | Facility: CLINIC | Age: 48
End: 2023-09-27
Payer: MEDICAID

## 2023-09-27 VITALS
OXYGEN SATURATION: 98 % | SYSTOLIC BLOOD PRESSURE: 130 MMHG | DIASTOLIC BLOOD PRESSURE: 86 MMHG | HEIGHT: 64 IN | WEIGHT: 123 LBS | HEART RATE: 118 BPM | RESPIRATION RATE: 18 BRPM | TEMPERATURE: 98 F | BODY MASS INDEX: 21 KG/M2

## 2023-09-27 DIAGNOSIS — C92.10 CML (CHRONIC MYELOCYTIC LEUKEMIA): Primary | ICD-10-CM

## 2023-09-27 NOTE — PROGRESS NOTES
Hematology/Oncology Outpatient Follow Up    PATIENT NAME:Nieves Mc  :1975  MRN: [id number]  PRIMARY CARE PHYSICIAN: Alida Latham APRN  REFERRING PHYSICIAN: Alida Latham, *    Chief Complaint   Patient presents with    Follow-up     CML (chronic myelocytic leukemia)        HISTORY OF PRESENT ILLNESS:          Patient is a 47 y.o. female with PMH significant for CML on treatment with Imatinib.  Patient stated that she typically feels poorly with Imatinib with frequent nausea and vomiting.  Also complained of weakness and fatigue.  Patient presented to ED on 10/22/18 with complaints of an elevated blood sugar around 400.  Stated she felt poorly and has had a productive cough with yellow sputum.  Complained of nausea and stated she was unable to keep any food down anytime she ate.  The patient reported subjective fever without chills.  She stated she had been coughing so hard that she was vomiting.  She denied hematemesis.  Denied diarrhea, constipation or blood in her stool.       Patient’s chest x-ray showed no evidence of acute pulmonary embolus.  The patient has ground-glass opacities throughout the lungs.  There is some air trapping noted which would appear to be   infectious.  Patient was started on antibiotics.      Hem/Onc was consulted on 10/23/18 for co-management of patient with CML.  Patient was seen by Dr. Lerma on 10/12 on previous admission for patient reported CML on Imatinib (Gleevec).  Patient reports she is under the care of Dr. Roach at [facility] in [place].  Patient was seen by Dr. Lerma in May 2018 when patient presented with hematuria, which was attributed to her Imatinib.  Dr. Lerma followed during hospitalization but then patient returned to Dr. Roach for her usual follow up.  She was again seen on 10/12.  Patient is stating she will switch to Dr. Lerma.    Review of Dr. Roach’s note:  On 18 patient had BCR-abl which was 61.326.  Patient  had been on Imatinib 400 mg daily.  She had presented in March 2018 with WBC of 24, hemoglobin 13.1, platelet count of 1,067,000.  Patient apparently had follow up BCR-abl in August 2018, results are not available for review.  Her bone marrow aspiration and biopsy was also performed at that time is currently not available for review.    11/6/18 - .  Uric acid 6.5.  BCR-abl by RT-PCR was measured at 3.36%.    Due to thrombocytosis, patient was placed on Hydroxyurea 500 mg twice a day on 12/11/18.  Platelet count juana to 900,000.  Patient was noncompliant with CBCs that were recommended.    Patient was asked to return to the office after not being able to reach her.   12/27/18 - Bone marrow aspiration and biopsy was consistent with chronic myeloid leukemia.  BCR-abl positive, chronic phase.  ABL kinase mutational analysis is currently pending on the bone marrow.    1/3/19 - Repeat CBC, WBC 17, hemoglobin 11.9, platelet count 1,072,000.  Hydroxyurea was increased to 1 gm twice a day with follow up CBC.    1/6/19 - Follow up CBC showed progressive increase in platelet to 1.1 million.  Patient was offered admission to the hospital, which she declined.  Hydroxyurea was increased to 1 gm three   times a day as of 1/6/19.   1/7/19 - EKG shows sinus tachycardia.   milliseconds.    ABL kinase on peripheral blood was ordered on 1/11/19.  Based on sequence analysis, there was no mutation detected.    2/12/19 - Patient was initiated on Tasigna 300 mg twice a day.  2/13/19 - Urinalysis was negative for hematuria.   2/22/19 -  milliseconds.  3/13/19 - EKG with QTC is 413 milliseconds.  Nonspecific changes noted.    4/18/19 - EKG showed QTC prolonged to 453 milliseconds.  This is changed from prior.  4/18/19 - Free T4 normal at 0.91.  Magnesium was 1.7.  BUN is 6.  Creatinine 0.7.  Bilirubin 2.2.  Phosphorous normal 3.7.  TSH 0.43, normal.  Free T3 was normal at 3.47.  Ionized calcium   normal at 1.23.  BCR-abl  was 0.522%.    5/7/19 - EKG showed QTC of 441 milliseconds.  QT was 366.     Patient has been lost to follow-up since 2019 for CML.  Patient states that she lost her insurance.  She also does not have any primary care physician at this time.  She is diabetic on insulin.  Patient was recently admitted to the hospital for pneumonia due to COVID-19 infection.  At that time patient made a decision to become compliant with treatment.  She is currently on to [employer]a 300 mg p.o. twice a day.  She is also on hydroxyurea 1 g twice a day.  Overall she feels better, she has more energy.  3/1/2022 white count is 53.92, hemoglobin 11.7 and platelets are 472    6/1/2022: Patient has not been seen since March 2022.  Also verified that she has not been taking to Tasigna since February 2022.  She comes in today with cough for 1 and half months, shortness of breath, denies fevers.  6/30/2022 patient had 2D echocardiogram which showed an EF of 41 to 45%.  Left ventricular cavity is mildly dilated.  She was diagnosed with nonischemic cardiomyopathy    11/18/2022: Patient was transferred from [facility] to [facility] due to cardiac failure.  She was then seen by NP [facility] hematology department.  Patient underwent tumor aspiration and biopsy at that time did not show chronic myeloid leukemia in accelerated phase markedly hypercellular marrow with megakaryocytic and myeloid hyperplasia with atypia and increased basophils blasts are not increased less than 1% moderate reticulin fibrosis.  According to the patient hydroxyurea was discontinued she was prescribed Gleevec 600 mg daily but she has only been taking 400 mg as she cannot find other milligram tablets.  She is already been pump inserted into her pelvic area.  She continues to experience nausea which resulted in her not taking the baclofen she should.  1/12/2023: WBC 17.64, hemoglobin 10.2, MCV 88.8, platelets 458,000.  On Gleevec 600 mg daily and  hydroxyurea 500 mg twice daily.  2023: WBC 10.67, hemoglobin 10.0, MCV 86.4, platelets 370,000.  Patient on Gleevec 600 mg daily and hydroxyurea 500 mg once a day.  Patient instructed at the visit to discontinue her hydroxyurea.  2023: WBC 17.75, hemoglobin 10.4, MCV 87.2, platelets 425,000.  On Gleevec 600 mg daily.  3/10/2023: 2D echocardiogram showing EF of 44% EKG showing sinus tachycardia QTc of 491    Past Medical History:   Diagnosis Date    Bone pain     Chronic constipation 2023    COVID-19 virus infection 2022    Diabetes mellitus     Diabetic gastroparesis 2023    Extremity pain     Carlin. legs pain    Fall during current hospitalization 2023    Leg pain     left leg greater    Migraine     Pubic ramus fracture, left, closed, initial encounter 2023    Pulmonary embolism     Vision loss     doing surgery       Past Surgical History:   Procedure Laterality Date    BONE MARROW BIOPSY      BREAST SURGERY      BRONCHOSCOPY N/A 2022    Procedure: BRONCHOSCOPY bilateral lung washing;  Surgeon: Charlene Camara MD;  Location: [facility] ENDOSCOPY;  Service: Pulmonary;  Laterality: N/A;  post: rule out infection vs transfusion lung injury     SECTION      CHOLECYSTECTOMY      COLONOSCOPY N/A 2023    Procedure: COLONOSCOPY;  Surgeon: Freddy Villeda MD;  Location: [facility] ENDOSCOPY;  Service: Gastroenterology;  Laterality: N/A;  poor prep    ENDOSCOPY N/A 2023    Procedure: ESOPHAGOGASTRODUODENOSCOPY with biopsy x2 areas;  Surgeon: Freddy Villeda MD;  Location: [facility] ENDOSCOPY;  Service: Gastroenterology;  Laterality: N/A;  food in stomach;abnormal duodenal mucosa    EYE SURGERY      laser surgery due  to hemmorage--- 2021-- another surgery  lt eye11/15/21    RETINAL DETACHMENT SURGERY      SPINE SURGERY      Lombardi spinal block    TUBAL ABDOMINAL LIGATION           Current Outpatient Medications:     acetaminophen (TYLENOL) 325 MG tablet, Take 2 tablets  by mouth Every 4 (Four) Hours As Needed. Indications: Fever, Pain, Disp: , Rfl:     ALPRAZolam (Xanax) 0.5 MG tablet, Take 1 tablet by mouth At Night As Needed for Anxiety. Indications: Feeling Anxious, Disp: 30 tablet, Rfl: 0    budesonide-formoterol (SYMBICORT) 160-4.5 MCG/ACT inhaler, Inhale 2 puffs 2 (Two) Times a Day., Disp: 10.2 g, Rfl: 1    citalopram (CeleXA) 10 MG tablet, Take 1 tablet by mouth Daily. Indications: Generalized Anxiety Disorder, Disp: , Rfl:     Continuous Blood Gluc Sensor (FreeStyle Zahira 2 Sensor) misc, , Disp: , Rfl:     dapagliflozin Propanediol (Farxiga) 10 MG tablet, Take 10 mg by mouth Daily., Disp: 90 tablet, Rfl: 3    furosemide (LASIX) 40 MG tablet, Take 1 tablet by mouth Daily. Indications: Edema, Disp: , Rfl:     gabapentin (NEURONTIN) 300 MG capsule, Take 1 capsule by mouth 3 (Three) Times a Day. Indications: Diabetes with Nerve Disease, Disp: , Rfl:     hydrOXYzine (ATARAX) 25 MG tablet, Take 1 tablet by mouth Every 8 (Eight) Hours As Needed. Indications: Feeling Anxious, Disp: , Rfl:     Insulin Glargine (BASAGLAR KWIKPEN) 100 UNIT/ML injection pen, Inject 20 Units under the skin into the appropriate area as directed Daily. (Patient not taking: Reported on 9/19/2023), Disp: 10 mL, Rfl: 3    metoclopramide (Reglan) 10 MG tablet, Take 1 tablet by mouth 4 (Four) Times a Day Before Meals & at Bedtime., Disp: 120 tablet, Rfl: 11    metoclopramide (REGLAN) 10 MG tablet, Take 10 mg by mouth 4 (Four) Times a Day. Indications: Indigestion, Disp: , Rfl:     metoprolol succinate XL (TOPROL-XL) 25 MG 24 hr tablet, Take 1 tablet by mouth Daily., Disp: 90 tablet, Rfl: 3    midodrine (PROAMATINE) 10 MG tablet, Take 1 tablet by mouth Every 8 (Eight) Hours., Disp: 90 tablet, Rfl: 0    mirtazapine (REMERON) 15 MG tablet, Take 1 tablet by mouth every night at bedtime., Disp: 30 tablet, Rfl: 2    ondansetron ODT (ZOFRAN-ODT) 8 MG disintegrating tablet, Place 1 tablet on the tongue Every 8 (Eight)  Hours As Needed for Nausea or Vomiting., Disp: 20 tablet, Rfl: 2    oxyCODONE (ROXICODONE) 10 MG tablet, Take 1 tablet by mouth Every 6 (Six) Hours As Needed for Moderate Pain. Indications: Chronic Pain, Disp: 40 tablet, Rfl: 0    pain patient supplied pump, 0.375 mL/hr by Intrathecal route Daily. Active pump Prescriber: Dr. Shelton - pain management  Med name/ concentration: Dilaudid  Last refill: due soon Lockout period: every 4 hours   Indications: chronic pain, Disp: , Rfl:     tiZANidine (ZANAFLEX) 4 MG tablet, Take 1 tablet by mouth Daily. Indications: Musculoskeletal Pain, Disp: , Rfl:     traZODone (DESYREL) 50 MG tablet, Take 25 mg by mouth every night at bedtime. Indications: Trouble Sleeping, Disp: , Rfl:   No current facility-administered medications for this visit.    Facility-Administered Medications Ordered in Other Visits:     acetaminophen (TYLENOL) tablet 650 mg, 650 mg, Oral, Once, Denisha Gill APRN    No Known Allergies    Family History   Problem Relation Age of Onset    Diabetes Mother     Diabetes Maternal Grandmother     Heart attack Maternal Grandmother     Stroke Maternal Grandmother        Cancer-related family history is not on file.    Social History     Tobacco Use    Smoking status: Never    Smokeless tobacco: Never   Vaping Use    Vaping Use: Never used   Substance Use Topics    Alcohol use: No    Drug use: No     I have reviewed and confirmed the accuracy of the patient's history: Chief complaint, HPI, ROS, and Subjective as entered by the MA/MAURAN/RN. Meghann Lerma MD 09/28/23      SUBJECTIVE:    Pain management is better on 10 mg of oxycodone every 4 hours.  Patient to follow-up with her pain management physician in [place] in July.     She was recently seen by her pain physician in [place].  Pain pump was increased    Patient is here today for routine follow-up.  She discontinued ponatinib due to generalized body aches and pains and also her fear about bone  "marrow suppression    REVIEW OF SYSTEMS:    Review of Systems   Constitutional: Negative for chills and fever.   HENT: Negative for ear pain, mouth sores, nosebleeds and sore throat.    Eyes: Negative for photophobia and visual disturbance.   Respiratory: Negative for wheezing and stridor.    Cardiovascular: Negative for chest pain and palpitations.   Gastrointestinal: Negative for abdominal pain, diarrhea, nausea and vomiting.   Endocrine: Negative for cold intolerance and heat intolerance.   Genitourinary: Negative for dysuria and hematuria.   Musculoskeletal: Negative for joint swelling and neck stiffness.  Positive for back and leg pain.   Skin: Negative for color change and rash.   Neurological: Negative for seizures and syncope.   Hematological: Negative for adenopathy.        No obvious bleeding   Psychiatric/Behavioral: Negative for agitation, confusion and hallucinations. Positive for insomnia, anxiety, depression.      OBJECTIVE:    Vitals:    09/27/23 1517   BP: 130/86   Pulse: 118   Resp: 18   Temp: 98 °F (36.7 °C)   TempSrc: Infrared   SpO2: 98%   Weight: 55.8 kg (123 lb)   Height: 162.6 cm (64\")   PainSc:   5   PainLoc: Generalized           Body mass index is 21.11 kg/m².    ECOG  (1) Restricted in physically strenuous activity, ambulatory and able to do work of light nature    Physical Exam  Vitals and nursing note reviewed.   Constitutional:       General: She is not in acute distress.     Appearance: She is not diaphoretic.   HENT:      Head: Normocephalic and atraumatic.   Eyes:      General: No scleral icterus.        Right eye: No discharge.         Left eye: No discharge.      Conjunctiva/sclera: Conjunctivae normal.   Neck:      Thyroid: No thyromegaly.   Cardiovascular:      Rate and Rhythm: Normal rate and regular rhythm.      Heart sounds: Normal heart sounds. No friction rub. No gallop.    Pulmonary:      Effort: Pulmonary effort is normal. No respiratory distress.      Breath sounds: No " stridor. No wheezing.   Abdominal:      General: Bowel sounds are normal.      Palpations: Abdomen is soft. There is no mass.      Tenderness: There is no abdominal tenderness. There is no guarding or rebound.   Musculoskeletal:         General: No tenderness. Normal range of motion.      Cervical back: Normal range of motion and neck supple.   Lymphadenopathy:      Cervical: No cervical adenopathy.   Skin:     General: Skin is warm.      Findings: No erythema or rash.   Neurological:      Mental Status: She is alert and oriented to person, place, and time.      Motor: No abnormal muscle tone.   Psychiatric:         Behavior: Behavior      I have reexamined the patient and the results are consistent with the previously documented exam. Meghann Lerma MD      RECENT LABS    WBC   Date Value Ref Range Status   09/25/2023 5.10 3.40 - 10.80 10*3/mm3 Final     RBC   Date Value Ref Range Status   09/25/2023 2.73 (L) 3.77 - 5.28 10*6/mm3 Final     Hemoglobin   Date Value Ref Range Status   09/25/2023 7.7 (L) 12.0 - 15.9 g/dL Final     Hematocrit   Date Value Ref Range Status   09/25/2023 23.7 (L) 34.0 - 46.6 % Final     MCV   Date Value Ref Range Status   09/25/2023 87.1 79.0 - 97.0 fL Final     MCH   Date Value Ref Range Status   09/25/2023 28.2 26.6 - 33.0 pg Final     MCHC   Date Value Ref Range Status   09/25/2023 32.3 31.5 - 35.7 g/dL Final     RDW   Date Value Ref Range Status   09/25/2023 18.2 (H) 12.3 - 15.4 % Final     RDW-SD   Date Value Ref Range Status   09/25/2023 58.6 (H) 37.0 - 54.0 fl Final     MPV   Date Value Ref Range Status   09/25/2023 7.3 6.0 - 12.0 fL Final     Platelets   Date Value Ref Range Status   09/25/2023 95 (L) 140 - 450 10*3/mm3 Final     Neutrophil %   Date Value Ref Range Status   09/25/2023 54.2 42.7 - 76.0 % Final     Lymphocyte %   Date Value Ref Range Status   09/25/2023 33.5 19.6 - 45.3 % Final     Monocyte %   Date Value Ref Range Status   09/25/2023 11.5 5.0 - 12.0 % Final      Eosinophil %   Date Value Ref Range Status   09/25/2023 0.2 (L) 0.3 - 6.2 % Final     Basophil %   Date Value Ref Range Status   09/25/2023 0.6 0.0 - 1.5 % Final     External Neutrophils Abs   Date Value Ref Range Status   11/30/2022 7.6 (H) 1.7 - 6.0 x10(3)/ul Final     Neutrophils, Absolute   Date Value Ref Range Status   09/25/2023 2.80 1.70 - 7.00 10*3/mm3 Final     Lymphocytes, Absolute   Date Value Ref Range Status   09/25/2023 1.70 0.70 - 3.10 10*3/mm3 Final     Monocytes, Absolute   Date Value Ref Range Status   09/25/2023 0.60 0.10 - 0.90 10*3/mm3 Final     Eosinophils, Absolute   Date Value Ref Range Status   09/25/2023 0.00 0.00 - 0.40 10*3/mm3 Final     Basophils, Absolute   Date Value Ref Range Status   09/25/2023 0.00 0.00 - 0.20 10*3/mm3 Final     nRBC   Date Value Ref Range Status   09/25/2023 0.1 0.0 - 0.2 /100 WBC Final       Lab Results   Component Value Date    GLUCOSE 264 (H) 08/15/2023    BUN 16 08/15/2023    CREATININE 0.57 08/15/2023    EGFRIFNONA 133 02/02/2022    BCR 28.1 (H) 08/15/2023    K 3.9 08/15/2023    CO2 28.0 08/15/2023    CALCIUM 9.5 08/15/2023    ALBUMIN 3.4 (L) 08/15/2023    LABIL2 1.0 04/18/2019    AST 20 08/15/2023    ALT 24 08/15/2023           ASSESSMENT    Accelerated phase CML, status post bone marrow biopsy on 11/3/2022.  Patient was on ponatinib but developed significant pancytopenia and therefore treatment has been held.  Patient is not able to tolerate ponatinib despite significantly low doses at 10 mg every other day.  We will therefore discontinue ponatinib  We will prescribe Sprycel 100 mg p.o. daily to see if she will tolerate this.  Pain management issues, patient initially refusing to follow-up with her physician at [facility].  Patient has been seen by her pain physician.  She will continue follow-up with our pain physicians  Pancytopenia: Counts are slowly improving  BCR ABL kinase mutation assay was negative  Cardiomyopathy her most  recent echo shows an EF of 44% which is an improvement from 26 to 30%.  She will continue to follow-up with her cardiologist  Anxiety: Refill angiolytics today  Pancytopenia secondary to med, CML not requiring blood transfusion now.  For now continue to observe she will remain off TKI's  CML in chronic phase with no significant hematologic or molecular or cytogenetic response on   Imatinib.  ABL kinase mutational analysis was negative.  We  have represcribed to Tasigna 300 mg twice a day.  Patient has been noncompliant with treatments and ended up in the hospital with hyperleukocytosis, peripheral blood blasts.  Bone marrow biopsy suggest that she remains in chronic phase.  Continue to Tasiigna at the current doses.  Hydroxyurea has been discontinued.  Uncontrolled diabetes type 1, followed at [facility] by Dr. Calles  Chronic anemia: Stable, multifactorial from CML, to Tasigna, hydroxyurea.  This has improved  Insomnia  Situational anxiety, not suicidal.  Continue Xanax 0.5 mg once daily.  Hyperleukocytosis secondary to CML  History of medical noncompliance  Pulmonary embolism: Xarelto restarted  Recent COVID-19 pneumonia  History of prolonged QTC        Plans    Begin Sprycel we will order in [facility] and notify [facility] pharmacist.  Reviewed the benefits and the side effects patient  Would request for HLA matched platelet transfusion tomorrow  Change CBCs to once a week to be drawn by visiting nurses.  Continue the same  Discontinue ponatinib  Patient to establish with new PCP.  She was given numbers to Dr. Christina Stewart's office to call  CMP, LDH uric acid level, BCR ABL RT-PCR today  Discontinue oxycodone.  Pain management will take over her pain medications  BCR ABL kinase mutational analysis was negative  Request additional records from [facility]  DC trazodone 25 mg due to QT prolongation  Continue to follow-up with cardiology in regards to dosing of her diuretics.  EKG  reviewed.  She has slight prolongation of QT.  We will discontinue Zofran and Phenergan and use Compazine as needed for nausea.  Prescription has been sent to her pharmacy  Refill Xanax  All questions answered  Continue to follow-up with PCP per routine  Follow-up with pain management per routine  Follow-up 4 weeks      I spent 40 total minutes, face-to-face, caring for Hope today. 90% of this time involved counseling and/or coordination of care as documented within this note.

## 2023-09-28 ENCOUNTER — TELEPHONE (OUTPATIENT)
Dept: ONCOLOGY | Facility: CLINIC | Age: 48
End: 2023-09-28
Payer: MEDICAID

## 2023-09-28 ENCOUNTER — DOCUMENTATION (OUTPATIENT)
Dept: PHARMACY | Facility: HOSPITAL | Age: 48
End: 2023-09-28
Payer: MEDICAID

## 2023-09-28 ENCOUNTER — SPECIALTY PHARMACY (OUTPATIENT)
Dept: PHARMACY | Facility: HOSPITAL | Age: 48
End: 2023-09-28
Payer: MEDICAID

## 2023-09-28 NOTE — PROGRESS NOTES
Specialty Pharmacy Note     Prior Authorization was done thru covermymeds. It was approved for Dasatinib (Sprycel) 100mg. Coverage is from 6/29/23 to 9/27/24. Test claim in [facility] returned a $4.30 co-pay for Dasatinib (Sprycel) 100mg (30 tabs/30-day supply).    Jyotsna Anton - CARE COORDINATOR  9/28/2023  15:15 EDT

## 2023-09-28 NOTE — PROGRESS NOTES
Oral Chemotherapy - New Referral    Received a referral from Dr. Lerma    Treatment Plan: Sprycel (dasatinib)  Start date of treatment planned for: As soon as oral specialty medication is available.  Indication: chronic myelocytic leukemia  Relevant past treatments: Gleevec, Tasigna, and Iclusig  Is the therapy appropriate based on treatment guidelines and FDA labeling?: Yes  Therapeutic Goals: Continue treatment until progression or intolerable toxicity  Patient can self-administer oral medications: Yes    Drug-Drug Interactions: The current medication list was reviewed and there are no relevant drug-drug interactions with the specialty medication.  Medication Allergies: The patient has NKDA  Review of Labs/Dose Adjustments: The patient's most recent labs were reviewed and all are WNL to start treatment at this dose.     A prescription was released to be determined specialty pharmacy for   Drug: Sprycel (dasatinib)  Strength: 100 mg  Directions: Take 1 tablet by mouth daily  Quantity: 30  Refills: 5    Pharmacy education is not yet scheduled.    Amy Aldridge Pharm.D.    9/28/2023  11:15 EDT

## 2023-09-29 ENCOUNTER — TELEPHONE (OUTPATIENT)
Dept: ONCOLOGY | Facility: CLINIC | Age: 48
End: 2023-09-29

## 2023-09-29 NOTE — TELEPHONE ENCOUNTER
Caller: Nieves Mc A    Relationship: Self    Best call back number: [phone]    What is the best time to reach you: ANY.LEAVE VM    Who are you requesting to speak with (clinical staff, provider,  specific staff member): SCHEDULING        What was the call regarding: PATIENT CALLED TO SCHEDULE CHEMO EDUCATION APPT FOR THIS COMING MONDAY ON 10/2/23    Is it okay if the provider responds through MyChart: YES

## 2023-09-29 NOTE — PROGRESS NOTES
Specialty Pharmacy Note: Sprycel (dasatinib)    Drug: Sprycel (dasatinib)  Strength: 100 mg  Directions: 1 tablet by mouth daily  QTY: 30  RF:5    Released to pharmacy: awaiting MD signature and pharmacy education  Drug interactions to discuss during education is category D interaction with acetaminophen: acetaminophen may enhance hepatotoxic effect of dasatinib. Dasatinib may increase the serum concentration of acetaminophen.  Category C interaction with citalopram and ondansetron that QT prolonging antidepressants may enhance Qtc prolonging effect of QT prolongatin kinase inhibitors.    Completed independent double check on medication order/RX.        Thanks,    Ros CALHOUN, PharmD

## 2023-10-02 ENCOUNTER — LAB REQUISITION (OUTPATIENT)
Dept: LAB | Facility: HOSPITAL | Age: 48
End: 2023-10-02
Payer: MEDICARE

## 2023-10-02 ENCOUNTER — HOME CARE VISIT (OUTPATIENT)
Dept: HOME HEALTH SERVICES | Facility: HOME HEALTHCARE | Age: 48
End: 2023-10-02
Payer: COMMERCIAL

## 2023-10-02 VITALS
OXYGEN SATURATION: 97 % | DIASTOLIC BLOOD PRESSURE: 62 MMHG | SYSTOLIC BLOOD PRESSURE: 140 MMHG | RESPIRATION RATE: 18 BRPM | HEART RATE: 107 BPM | TEMPERATURE: 98.8 F

## 2023-10-02 DIAGNOSIS — C92.10 CHRONIC MYELOID LEUKEMIA, BCR/ABL-POSITIVE, NOT HAVING ACHIEVED REMISSION: ICD-10-CM

## 2023-10-02 LAB
BASOPHILS # BLD AUTO: 0.1 10*3/MM3 (ref 0–0.2)
BASOPHILS NFR BLD AUTO: 0.9 % (ref 0–1.5)
DEPRECATED RDW RBC AUTO: 58.2 FL (ref 37–54)
EOSINOPHIL # BLD AUTO: 0 10*3/MM3 (ref 0–0.4)
EOSINOPHIL NFR BLD AUTO: 0.5 % (ref 0.3–6.2)
ERYTHROCYTE [DISTWIDTH] IN BLOOD BY AUTOMATED COUNT: 18.7 % (ref 12.3–15.4)
HCT VFR BLD AUTO: 27.2 % (ref 34–46.6)
HGB BLD-MCNC: 8.7 G/DL (ref 12–15.9)
LYMPHOCYTES # BLD AUTO: 1.9 10*3/MM3 (ref 0.7–3.1)
LYMPHOCYTES NFR BLD AUTO: 30.2 % (ref 19.6–45.3)
MCH RBC QN AUTO: 28.9 PG (ref 26.6–33)
MCHC RBC AUTO-ENTMCNC: 32.1 G/DL (ref 31.5–35.7)
MCV RBC AUTO: 89.9 FL (ref 79–97)
MONOCYTES # BLD AUTO: 0.8 10*3/MM3 (ref 0.1–0.9)
MONOCYTES NFR BLD AUTO: 12 % (ref 5–12)
NEUTROPHILS NFR BLD AUTO: 3.6 10*3/MM3 (ref 1.7–7)
NEUTROPHILS NFR BLD AUTO: 56.4 % (ref 42.7–76)
NRBC BLD AUTO-RTO: 0 /100 WBC (ref 0–0.2)
PLATELET # BLD AUTO: 106 10*3/MM3 (ref 140–450)
PMV BLD AUTO: 8.1 FL (ref 6–12)
RBC # BLD AUTO: 3.03 10*6/MM3 (ref 3.77–5.28)
WBC NRBC COR # BLD: 6.4 10*3/MM3 (ref 3.4–10.8)

## 2023-10-02 PROCEDURE — 85025 COMPLETE CBC W/AUTO DIFF WBC: CPT | Performed by: INTERNAL MEDICINE

## 2023-10-02 PROCEDURE — G0299 HHS/HOSPICE OF RN EA 15 MIN: HCPCS

## 2023-10-02 NOTE — HOME HEALTH
Routine Visit Note:    Skill/education provided: cardiopulmonary assessment,med education, pain assessment, fall prevention education, pain assessment, labs per md order    Patient/caregiver response: pt stated understanding    Plan for next visit:  cardiopulmonary assessment,med education, pain assessment, fall prevention education, pain assessment, labs per md order    Other pertinent info: monitor for falls

## 2023-10-03 ENCOUNTER — TELEPHONE (OUTPATIENT)
Dept: ONCOLOGY | Facility: CLINIC | Age: 48
End: 2023-10-03
Payer: MEDICAID

## 2023-10-05 ENCOUNTER — SPECIALTY PHARMACY (OUTPATIENT)
Dept: PHARMACY | Facility: HOSPITAL | Age: 48
End: 2023-10-05
Payer: MEDICAID

## 2023-10-05 DIAGNOSIS — C92.10 BLAST CRISIS PHASE OF CHRONIC MYELOID LEUKEMIA: ICD-10-CM

## 2023-10-05 DIAGNOSIS — R94.31 ABNORMAL EKG: ICD-10-CM

## 2023-10-05 DIAGNOSIS — C92.10 CML (CHRONIC MYELOCYTIC LEUKEMIA): Primary | ICD-10-CM

## 2023-10-05 DIAGNOSIS — E79.0 HYPERURICEMIA: ICD-10-CM

## 2023-10-05 DIAGNOSIS — C95.90 LEUKEMIA NOT HAVING ACHIEVED REMISSION, UNSPECIFIED LEUKEMIA TYPE: Primary | ICD-10-CM

## 2023-10-05 DIAGNOSIS — Z51.81 ENCOUNTER FOR MEDICATION MONITORING: ICD-10-CM

## 2023-10-05 RX ORDER — PROCHLORPERAZINE MALEATE 10 MG
10 TABLET ORAL EVERY 6 HOURS PRN
Qty: 30 TABLET | Refills: 5 | Status: SHIPPED | OUTPATIENT
Start: 2023-10-05

## 2023-10-05 NOTE — PROGRESS NOTES
Specialty Pharmacy Patient Management Program  Oncology Initial Assessment       Nieves Mc is a 47 y.o. female with chronic myelocytic anemia seen by an Oncology provider and enrolled in the Oncology Patient Management program offered by [facility].  An initial outreach was conducted, including assessment of therapy appropriateness and specialty medication education for Sprycel (dasatinib). The patient was introduced to services offered by [facility], including: regular assessments, refill coordination, curbside pick-up or mail order delivery options, prior authorization maintenance, and financial assistance programs as applicable. The patient was also provided with contact information for the pharmacy team.     Regimen: Sprycel (dasatinib) 100mg PO daily    Start date of oral specialty medication: As soon as oral specialty medication is available. and with expected delivery on 10/10/23    Relevant Past Medical History, Comorbidities, and Vaccines  Relevant medical history and concomitant health conditions were discussed with the patient. The patient's chart has been reviewed for relevant past medical history and comorbid health conditions and updated as necessary.  Vaccines are coordinated by the patient's oncologist and primary care provider.  Past Medical History:   Diagnosis Date    Bone pain     Chronic constipation 07/07/2023    COVID-19 virus infection 01/12/2022    Diabetes mellitus     Diabetic gastroparesis 07/07/2023    Extremity pain     Carlin. legs pain    Fall during current hospitalization 06/25/2023    Leg pain     left leg greater    Migraine     Pubic ramus fracture, left, closed, initial encounter 06/25/2023    Pulmonary embolism     Vision loss     doing surgery     Social History     Socioeconomic History    Marital status: Legally    Tobacco Use    Smoking status: Never    Smokeless tobacco: Never   Vaping Use    Vaping Use: Never used    Substance and Sexual Activity    Alcohol use: No    Drug use: No    Sexual activity: Defer       Allergies  Known allergies and reactions were discussed with the patient. The patient's chart has been reviewed for allergy information and updated as necessary.   No Known Allergies     Current Medication List  This medication list has been reviewed with the patient and evaluated for any interactions or necessary modifications/recommendations, and updated to include all prescription medications, OTC medications, and supplements the patient is currently taking.  This list reflects what is contained in the patient's profile, which has also been marked as reviewed to communicate to other providers it is the most up to date version of the patient's current medication therapy.   Prior to Admission medications    Medication Sig Start Date End Date Taking? Authorizing Provider   acetaminophen (TYLENOL) 325 MG tablet Take 2 tablets by mouth Every 4 (Four) Hours As Needed. Indications: Fever, Pain 11/17/22  Yes Melecio Almanzar MD   ALPRAZolam (Xanax) 0.5 MG tablet Take 1 tablet by mouth At Night As Needed for Anxiety. Indications: Feeling Anxious 9/14/23  Yes Meghann Lerma MD   citalopram (CeleXA) 10 MG tablet Take 1 tablet by mouth Daily. Indications: Generalized Anxiety Disorder 4/26/23  Yes ProviderMelecio MD   Continuous Blood Gluc Sensor (FreeStyle Zahira 2 Sensor) misc  3/22/23  Yes Melecio Almanzar MD   gabapentin (NEURONTIN) 300 MG capsule Take 2 capsules by mouth 3 (Three) Times a Day. Indications: Diabetes with Nerve Disease 5/16/23  Yes Melecio Almanzar MD   hydrOXYzine (ATARAX) 25 MG tablet Take 1 tablet by mouth Every 8 (Eight) Hours As Needed. Indications: Feeling Anxious 1/4/23  Yes Melecio Almanzar MD   midodrine (PROAMATINE) 10 MG tablet Take 1 tablet by mouth Every 8 (Eight) Hours. 5/31/23  Yes Martha Shields DO   ondansetron ODT (ZOFRAN-ODT) 8 MG disintegrating tablet  Place 1 tablet on the tongue Every 8 (Eight) Hours As Needed for Nausea or Vomiting. 5/17/23  Yes Denisha Gill APRN   pain patient supplied pump 0.375 mL/hr by Intrathecal route Daily. Active pump  Prescriber: Dr. Shelton - pain management   Med name/ concentration: Dilaudid   Last refill: due soon  Lockout period: every 4 hours   Indications: chronic pain 5/17/23  Yes Melecio Almanzar MD   tiZANidine (ZANAFLEX) 4 MG tablet Take 1 tablet by mouth Daily. Indications: Musculoskeletal Pain 5/10/23  Yes Melecio Almanzar MD   budesonide-formoterol (SYMBICORT) 160-4.5 MCG/ACT inhaler Inhale 2 puffs 2 (Two) Times a Day.  Patient not taking: Reported on 10/5/2023 8/5/22   Villa Patel MD   dapagliflozin Propanediol (Farxiga) 10 MG tablet Take 10 mg by mouth Daily.  Patient not taking: Reported on 10/5/2023 3/10/23   Jane Hopper MD   dasatinib (SPRYCEL) 100 MG chemo tablet Take 1 tablet by mouth Daily.  Patient not taking: Reported on 10/5/2023 10/5/23   Meghann Lerma MD   furosemide (LASIX) 40 MG tablet Take 1 tablet by mouth Daily. Indications: Edema  Patient not taking: Reported on 10/5/2023 12/19/22   Melecio Almanzar MD   Insulin Glargine (BASAGLAR KWIKPEN) 100 UNIT/ML injection pen Inject 20 Units under the skin into the appropriate area as directed Daily.  Patient not taking: Reported on 9/19/2023 7/10/22   Anthony Herndon DO   metoprolol succinate XL (TOPROL-XL) 25 MG 24 hr tablet Take 1 tablet by mouth Daily.  Patient not taking: Reported on 10/5/2023 3/10/23   aJne Hopper MD   mirtazapine (REMERON) 15 MG tablet Take 1 tablet by mouth every night at bedtime.  Patient not taking: Reported on 10/5/2023 3/24/23   Denisha Gill APRN   prochlorperazine (COMPAZINE) 10 MG tablet Take 1 tablet by mouth Every 6 (Six) Hours As Needed for Nausea or Vomiting.  Patient not taking: Reported on 10/5/2023 10/5/23   Meghann Lerma MD   traZODone (DESYREL) 50 MG  tablet Take 25 mg by mouth every night at bedtime. Indications: Trouble Sleeping  Patient not taking: Reported on 10/5/2023 4/26/23   Melecio Almanzar MD   ALPRAZolam (Xanax) 0.5 MG tablet Take 1 tablet by mouth At Night As Needed for Anxiety. Indications: Feeling Anxious 7/20/23 9/11/23  Meghann Lerma MD   Dasatinib (SPRYCEL PO) Take  by mouth.  10/5/23  Melecio Almanzar MD   metoclopramide (Reglan) 10 MG tablet Take 1 tablet by mouth 4 (Four) Times a Day Before Meals & at Bedtime.  Patient not taking: Reported on 10/5/2023 7/9/23 10/5/23  Yuniel Yancey MD   metoclopramide (REGLAN) 10 MG tablet Take 10 mg by mouth 4 (Four) Times a Day. Indications: Indigestion 7/9/23 10/5/23  Melecio Almanzar MD   oxyCODONE (ROXICODONE) 10 MG tablet Take 1 tablet by mouth Every 6 (Six) Hours As Needed for Moderate Pain. Indications: Chronic Pain  Patient not taking: Reported on 10/5/2023 7/3/23 10/5/23  Denisha Gill APRN   PONATinib (ICLUSIG) 15 MG chemo tablet Take 15 mg by mouth Every Other Day. Indications: Chronic Myelogenous Leukemia 8/21/23 9/27/23  Melecio Almanzar MD   PONATinib HCl (Iclusig) 10 MG tablet Take 10 mg by mouth Every Other Day. Take with or without food. Swallow tablets whole, do not chew, crush, or dissovle  Patient not taking: Reported on 9/27/2023 8/17/23 9/27/23  Meghann Lerma MD       Drug Interactions  Reviewed concomitant medications, allergies, labs, comorbidities/medical history, quality of life, and immunization history.   Drug-drug interactions noted and discussed during education:  Discussed interaction with acetaminophen and that acetaminophen may enhance hepatotoxic effects of dasatinib.  Patient reports that she has a pain pump now that controls her pain so she only uses acetaminophen up to twice a day and not every day-discussed using lower doses and limiting use as well as keeping lab appointments to monitor for changes to liver function.   Also discussed interaction with agents with QT potential.  Will get baseline EKG and monitor for changes  . Reminded the patient to let us know before making any changes or starting any new prescription or OTC medications so we can first assess drug interactions.  Drug-food interactions noted and discussed during education: Patient was instructed to avoid eating grapefruit and drinking grapefruit juice    Recommended Medications Assessment  Bone Health (such as calcium/vitamin D, bisphosphonate, RANKL inhibitor) - Not Indicated T  VTE prophylaxis - Not Indicated   Prophylactic antimicrobials - Not Indicated   Relevant Laboratory Values  Lab Results   Component Value Date    GLUCOSE 264 (H) 08/15/2023    CALCIUM 9.5 08/15/2023     08/15/2023    K 3.9 08/15/2023    CO2 28.0 08/15/2023     08/15/2023    BUN 16 08/15/2023    CREATININE 0.57 08/15/2023    EGFRIFNONA 133 02/02/2022    BCR 28.1 (H) 08/15/2023    ANIONGAP 9.0 08/15/2023     Lab Results   Component Value Date    WBC 6.40 10/02/2023    RBC 3.03 (L) 10/02/2023    HGB 8.7 (L) 10/02/2023    HCT 27.2 (L) 10/02/2023    MCV 89.9 10/02/2023    MCH 28.9 10/02/2023    MCHC 32.1 10/02/2023    RDW 18.7 (H) 10/02/2023    RDWSD 58.2 (H) 10/02/2023    MPV 8.1 10/02/2023     (L) 10/02/2023    NEUTRORELPCT 56.4 10/02/2023    LYMPHORELPCT 30.2 10/02/2023    MONORELPCT 12.0 10/02/2023    EOSRELPCT 0.5 10/02/2023    BASORELPCT 0.9 10/02/2023    NEUTROABS 3.60 10/02/2023    LYMPHSABS 1.90 10/02/2023    MONOSABS 0.80 10/02/2023    EOSABS 0.00 10/02/2023    BASOSABS 0.10 10/02/2023    NRBC 0.0 10/02/2023       The above labs have been reviewed. No dose adjustments are needed for the oral specialty medication(s) based on the labs.    Initial Education Provided for Specialty Medication  The patient has been provided with the following education. All questions and concerns have been addressed prior to the patient receiving the medication, and the patient has  verbalized understanding of the education and any materials provided.  Additional patient education shall be provided and documented upon request by the patient, provider or payer.      Provided patient with:   Education sheets about the medication, 24-hour clinic phone number and my contact information and instructions to call should additional questions arise.     Medication Education Sheets Provided: (select all that apply)  Oral Specialty Medication: Sprycel (dasatinib)  Other Education Sheets Provided: (select all that apply)  Adherence, Diarrhea, and Symptom Tracker Sheet and MEGGAN Information    TOPICS COMMENTS   Storage and Handling of Oral Specialty Medication Store in the original container, in a dry location out of direct sunlight, and out of reach of children or pets. and Store at room temperature.  Discussed safe handling and what to do with any unused medication.   Administration of Oral Specialty Medication Take with or without food at the same time(s) each day.   Adherence to Oral Specialty Regimen and Handling Missed Doses Patient is likely to have good treatment adherence; reinforced the importance of adherence. Reviewed how to address missed doses and to let us know of any missed doses.   Anemia: role of RBC, cause, s/s, ways to manage, role of transfusion Reviewed the role of RBC and the use of transfusions if hemoglobin decreases too much.  Patient to notify us if they experience shortness of breath, dizziness, or palpitations.  Also let patient know they could feel more tired than usual and to try to stay active, but rest if they need to.    Thrombocytopenia: role of platelet, cause, s/s, ways to prevent bleeding, things to avoid, when to seek help Reviewed the role of platelets in blood clotting and when to call clinic (bloody nose that bleeds for 5 mins despite pressure, a cut that won't stop bleeding despite pressure, gums that bleed excessively with brushing or flossing). Recommended using an  electric razor, soft bristle toothbrush, and blowing your nose gently.    Neutropenia: role of WBC, cause, infection precautions, s/s of infection, when to call MD Reviewed the role of WBC, good infection prevention practices, and when to call the clinic (temperature 100.4F, sore throat, burning urination, etc)  COVID Vaccines:  two vaccines  Flu Vaccine: Planning to receive 2023 flu vaccine   Nutrition and Appetite Changes:  importance of maintaining healthy diet & weight, ways to manage to improve intake, dietary consult, exercise regimen, electrolyte and/or blood glucose abnormalities Discussed importance of balanced diet and hydration.     Diarrhea: causes, s/s of dehydration, ways to manage, dietary changes, when to call MD Chemotherapy : Discussed the risk of diarrhea. Instructed patient on use OTC loperamide with diarrhea onset, but emphasized the importance of calling the clinic if 4-6 episodes in 24 hours not relieved by OTC loperamide.   Constipation: causes, ways to manage, dietary changes, when to call MD Provided supplementary handout with instructions for use of docusate and other OTC therapies to manage constipation.  Instructed to call us if medications aren't working.   Nausea/Vomiting: cause, use of antiemetics, dietary changes, when to call MD Emetic risk: Low  PRN home meds: Prochlorperazine  Pharmacy home meds sent to: [facility]    Instructed the patient to take a dose of the PRN medication at the first onset of nausea and if it's not working to call us for additional medications.  Also provided non-drug measures to mitigate nausea.   Mouth Sores: causes, oral care, ways to manage    Alopecia: cause, ways to manage, resources    Nervous System Changes: causes, s/s, neuropathies, cognitive changes, ways to manage    Pain: causes, ways to manage Chemo: Discussed muscle and joint aches/pains with chemotherapy, and recommended calling office due to interaction with acetaminophen and ibuprofen   Skin/Nail  Changes: cause, s/s, ways to manage Discussed potential for a rash or itchy skin from immunotherapy, offered nonpharmacologic prevention strategies, and instructed patient to call if a rash develops that worsens or gets larger       Organ Toxicities: cause, s/s, need for diagnostic tests, labs, when to notify MD Discussed potential effects on organ systems, monitoring, diagnostic tests, labs, and when to notify their MD. Discussed the signs/symptoms of the following:  edema, cardiotoxicity, lung changes, and skin changes   Miscellaneous Financial Issues: no copay  Lab Draws: On days 1,8,15,22 of cycles 1,2, then starting with cycle 3 labs on day 1 of each cycle thereafter   Infertility and Sexuality:  causes, fertility preservation options, sexuality changes, ways to manage, importance of birth control The patient is not of childbearing potential.   Home Care: how to manage bodily fluids Counseled on management of soiled linens and proper flush technique.  Discussed how to manage all the side effects at home and advised when to contact the MD office   Survivorship: distress, distress assessment, secondary malignancies, early/late effects, follow-up, social issues, social support      Adherence and Self-Administration  Barriers to Patient Adherence and/or Self-Administration: none  Methods for Supporting Patient Adherence and/or Self-Administration:  patient declined, reports she has a system set up at home and handles all her own medications  Expected duration of therapy: Until disease progression or intolerable toxicity    Goals of Therapy  Patient Goals of Therapy:   Consistently take medications as prescribed  Patient will adhere to medication regimen  Patient will report any medication side effects to healthcare provider  Clinical Goals:    Goals Addressed Today    None       Support patient understanding of medication regimen  Ensure patient knows the pharmacy contact information  Schedule regular follow-up to  monitor the treatment serious adverse events  Schedule regular follow-up to confirm medication adherence  Schedule regular follow-up to monitor disease progression or stability    Quality of Life Assessment   The patient's current (pre-therapy) quality of life was discussed with the patient. The QOL segment of this outreach has been reviewed and updated.   Quality of Life Score: 7/10    Reassessment Plan & Follow-Up  Pharmacist to perform regular reassessments no more than (6) months from the previous assessment.  Welcome information and patient satisfaction survey to be sent by retail team with patient's initial fill.  Care Coordinator to set up future refill outreaches, coordinate prescription delivery, and escalate clinical questions to pharmacist.     Additional Plans, Therapy Recommendations or Therapy Problems to Be Addressed: Medication delivery     Attestation I attest the patient was actively involved in and has agreed to the above plan of care. If the prescribed therapy is at any point deemed not appropriate based on the current or future assessments, a consultation will be initiated with the patient's specialty care provider to determine the best course of action. The revised plan of therapy will be documented along with any required assessments and/or additional patient education provided.     Ros CALHOUN, PharmD      Date and Time: 10/5/2023  11:39 EDT

## 2023-10-05 NOTE — PROGRESS NOTES
Specialty Pharmacy Patient Management Program  Oncology Refill Outreach      Nieves is a 47 y.o. female contacted today regarding refills of Dasatinib (Sprycel) specialty medication(s).    Specialty medication(s) and dose(s) confirmed: yes  Changes to medications: new start  Changes to insurance: no  Other medications being refilled: None          Delivery Questions      Flowsheet Row Most Recent Value   Delivery method Other (Comment)  [Beeline ]   Delivery address correct? Yes   Delivery phone number [phone]   Number of medications in delivery 1   Medication being filled and delivered sprycel   Doses left of specialty medications new start   Is there any medication that is due not being filled? No   Supplies needed? No supplies needed   Cooler needed? No   Do any medications need mixed or dated? No   Copay form of payment Payment plan already set up   Additional comments NA   Questions or concerns for the pharmacist? No   Explain any questions or concerns for the pharmacist NA   Are any medications first time fills? Yes          Ship on Monday 10/9/23 to arrive on Tuesday 10/10/23 to patient home.       Follow-up: 3 week(s)     Jyotsna Anton, Pharmacy Technician  Specialty Pharmacy Technician   10/5/2023   12:40 EDT

## 2023-10-09 ENCOUNTER — LAB REQUISITION (OUTPATIENT)
Dept: LAB | Facility: HOSPITAL | Age: 48
End: 2023-10-09
Payer: MEDICARE

## 2023-10-09 ENCOUNTER — HOME CARE VISIT (OUTPATIENT)
Dept: HOME HEALTH SERVICES | Facility: HOME HEALTHCARE | Age: 48
End: 2023-10-09
Payer: COMMERCIAL

## 2023-10-09 ENCOUNTER — TELEPHONE (OUTPATIENT)
Dept: ONCOLOGY | Facility: CLINIC | Age: 48
End: 2023-10-09

## 2023-10-09 VITALS
DIASTOLIC BLOOD PRESSURE: 64 MMHG | OXYGEN SATURATION: 97 % | HEART RATE: 104 BPM | TEMPERATURE: 98.7 F | RESPIRATION RATE: 19 BRPM | SYSTOLIC BLOOD PRESSURE: 142 MMHG

## 2023-10-09 DIAGNOSIS — C92.00 ACUTE MYELOBLASTIC LEUKEMIA, NOT HAVING ACHIEVED REMISSION: ICD-10-CM

## 2023-10-09 LAB
ALBUMIN SERPL-MCNC: 3.9 G/DL (ref 3.5–5.2)
ALBUMIN/GLOB SERPL: 1.5 G/DL
ALP SERPL-CCNC: 445 U/L (ref 39–117)
ALT SERPL W P-5'-P-CCNC: 49 U/L (ref 1–33)
ANION GAP SERPL CALCULATED.3IONS-SCNC: 10 MMOL/L (ref 5–15)
AST SERPL-CCNC: 36 U/L (ref 1–32)
BASOPHILS # BLD AUTO: 0.1 10*3/MM3 (ref 0–0.2)
BASOPHILS NFR BLD AUTO: 1.2 % (ref 0–1.5)
BILIRUB SERPL-MCNC: 0.8 MG/DL (ref 0–1.2)
BUN SERPL-MCNC: 20 MG/DL (ref 6–20)
BUN/CREAT SERPL: 32.8 (ref 7–25)
CALCIUM SPEC-SCNC: 10.6 MG/DL (ref 8.6–10.5)
CHLORIDE SERPL-SCNC: 99 MMOL/L (ref 98–107)
CO2 SERPL-SCNC: 27 MMOL/L (ref 22–29)
CREAT SERPL-MCNC: 0.61 MG/DL (ref 0.57–1)
DEPRECATED RDW RBC AUTO: 58.6 FL (ref 37–54)
EGFRCR SERPLBLD CKD-EPI 2021: 111.1 ML/MIN/1.73
EOSINOPHIL # BLD AUTO: 0 10*3/MM3 (ref 0–0.4)
EOSINOPHIL NFR BLD AUTO: 0.6 % (ref 0.3–6.2)
ERYTHROCYTE [DISTWIDTH] IN BLOOD BY AUTOMATED COUNT: 17.9 % (ref 12.3–15.4)
GLOBULIN UR ELPH-MCNC: 2.6 GM/DL
GLUCOSE SERPL-MCNC: 350 MG/DL (ref 65–99)
HCT VFR BLD AUTO: 25.3 % (ref 34–46.6)
HGB BLD-MCNC: 8.1 G/DL (ref 12–15.9)
LYMPHOCYTES # BLD AUTO: 1.2 10*3/MM3 (ref 0.7–3.1)
LYMPHOCYTES NFR BLD AUTO: 19.2 % (ref 19.6–45.3)
MCH RBC QN AUTO: 28.2 PG (ref 26.6–33)
MCHC RBC AUTO-ENTMCNC: 31.9 G/DL (ref 31.5–35.7)
MCV RBC AUTO: 88.4 FL (ref 79–97)
MONOCYTES # BLD AUTO: 1.1 10*3/MM3 (ref 0.1–0.9)
MONOCYTES NFR BLD AUTO: 18.9 % (ref 5–12)
NEUTROPHILS NFR BLD AUTO: 3.6 10*3/MM3 (ref 1.7–7)
NEUTROPHILS NFR BLD AUTO: 60.1 % (ref 42.7–76)
NRBC BLD AUTO-RTO: 0.2 /100 WBC (ref 0–0.2)
PLATELET # BLD AUTO: 109 10*3/MM3 (ref 140–450)
PMV BLD AUTO: 8.6 FL (ref 6–12)
POTASSIUM SERPL-SCNC: 4.3 MMOL/L (ref 3.5–5.2)
PROT SERPL-MCNC: 6.5 G/DL (ref 6–8.5)
RBC # BLD AUTO: 2.87 10*6/MM3 (ref 3.77–5.28)
SODIUM SERPL-SCNC: 136 MMOL/L (ref 136–145)
WBC NRBC COR # BLD: 6.1 10*3/MM3 (ref 3.4–10.8)

## 2023-10-09 PROCEDURE — 80053 COMPREHEN METABOLIC PANEL: CPT | Performed by: INTERNAL MEDICINE

## 2023-10-09 PROCEDURE — 85025 COMPLETE CBC W/AUTO DIFF WBC: CPT | Performed by: INTERNAL MEDICINE

## 2023-10-09 PROCEDURE — G0299 HHS/HOSPICE OF RN EA 15 MIN: HCPCS

## 2023-10-09 NOTE — HOME HEALTH
Routine Visit Note:    Skill/education provided: cardiopulmonary assessment, med education, pain assessment, fall prevnetion education, labs per md order     Patient/caregiver response: pt stated understanding    Plan for next visit: cardiopulmonary assessment, med education, pain assessment, fall prevnetion education, labs per md order       Other pertinent info: pt was started on a new chemo medication with her fist dose to be taken on 10.10.23

## 2023-10-09 NOTE — TELEPHONE ENCOUNTER
Caller: Nieves Mc A    Relationship: Self    Best call back number: [phone]    What is the best time to reach you: ANYTIME    Who are you requesting to speak with (clinical staff, provider, specific staff member): CLINICAL    What was the call regarding: PATIENT'S HEMOGLOBIN IS 8.1, RBC IS 2.87, AND PLATELETS . HER HOME HEALTH NURSE ADRIANNE THESE LABS THIS MORNING AT 9 AM.     PLEASE CALL PATIENT TO DISCUSS FURTHER.

## 2023-10-10 ENCOUNTER — SPECIALTY PHARMACY (OUTPATIENT)
Dept: PHARMACY | Facility: HOSPITAL | Age: 48
End: 2023-10-10
Payer: MEDICAID

## 2023-10-10 ENCOUNTER — TELEPHONE (OUTPATIENT)
Dept: ONCOLOGY | Facility: CLINIC | Age: 48
End: 2023-10-10
Payer: MEDICAID

## 2023-10-10 NOTE — TELEPHONE ENCOUNTER
Received a message from pharmacist, Ros, stating that the pt reported a headache, body aches, and nausea. Dr. Lerma recommended that the pt go to the hospital. I attempted to call the pt to relay this to her. No answer, detailed message with callback number left.

## 2023-10-12 DIAGNOSIS — F41.8 DEPRESSION WITH ANXIETY: ICD-10-CM

## 2023-10-13 RX ORDER — MIRTAZAPINE 15 MG/1
15 TABLET, FILM COATED ORAL
Qty: 30 TABLET | Refills: 2 | Status: SHIPPED | OUTPATIENT
Start: 2023-10-13

## 2023-10-14 ENCOUNTER — HOSPITAL ENCOUNTER (OUTPATIENT)
Dept: CARDIOLOGY | Facility: HOSPITAL | Age: 48
Discharge: HOME | End: 2023-10-14
Payer: MEDICARE

## 2023-10-14 DIAGNOSIS — R94.31 ABNORMAL EKG: ICD-10-CM

## 2023-10-14 DIAGNOSIS — Z51.81 ENCOUNTER FOR MEDICATION MONITORING: ICD-10-CM

## 2023-10-14 PROCEDURE — 93005 ELECTROCARDIOGRAM TRACING: CPT | Performed by: INTERNAL MEDICINE

## 2023-10-15 LAB
QT INTERVAL: 333 MS
QTC INTERVAL: 450 MS

## 2023-10-16 ENCOUNTER — LAB (OUTPATIENT)
Dept: LAB | Facility: HOSPITAL | Age: 48
End: 2023-10-16
Payer: MEDICARE

## 2023-10-16 ENCOUNTER — HOME CARE VISIT (OUTPATIENT)
Dept: HOME HEALTH SERVICES | Facility: HOME HEALTHCARE | Age: 48
End: 2023-10-16
Payer: MEDICAID

## 2023-10-16 ENCOUNTER — APPOINTMENT (OUTPATIENT)
Dept: PHARMACY | Facility: HOSPITAL | Age: 48
End: 2023-10-16
Payer: MEDICARE

## 2023-10-16 ENCOUNTER — HOME CARE VISIT (OUTPATIENT)
Dept: HOME HEALTH SERVICES | Facility: HOME HEALTHCARE | Age: 48
End: 2023-10-16
Payer: COMMERCIAL

## 2023-10-16 ENCOUNTER — SPECIALTY PHARMACY (OUTPATIENT)
Dept: PHARMACY | Facility: HOSPITAL | Age: 48
End: 2023-10-16
Payer: MEDICAID

## 2023-10-16 ENCOUNTER — TELEPHONE (OUTPATIENT)
Dept: ONCOLOGY | Facility: CLINIC | Age: 48
End: 2023-10-16
Payer: MEDICAID

## 2023-10-16 VITALS
HEART RATE: 117 BPM | RESPIRATION RATE: 18 BRPM | DIASTOLIC BLOOD PRESSURE: 64 MMHG | OXYGEN SATURATION: 96 % | SYSTOLIC BLOOD PRESSURE: 138 MMHG

## 2023-10-16 DIAGNOSIS — C92.10 CML (CHRONIC MYELOCYTIC LEUKEMIA): ICD-10-CM

## 2023-10-16 LAB
BASOPHILS # BLD AUTO: 0.1 10*3/MM3 (ref 0–0.2)
BASOPHILS NFR BLD AUTO: 0.6 % (ref 0–1.5)
DEPRECATED RDW RBC AUTO: 53.2 FL (ref 37–54)
EOSINOPHIL # BLD AUTO: 0.09 10*3/MM3 (ref 0–0.4)
EOSINOPHIL NFR BLD AUTO: 0.5 % (ref 0.3–6.2)
ERYTHROCYTE [DISTWIDTH] IN BLOOD BY AUTOMATED COUNT: 16.7 % (ref 12.3–15.4)
HCT VFR BLD AUTO: 28.6 % (ref 34–46.6)
HGB BLD-MCNC: 9.1 G/DL (ref 12–15.9)
LYMPHOCYTES # BLD AUTO: 4.36 10*3/MM3 (ref 0.7–3.1)
LYMPHOCYTES NFR BLD AUTO: 25.6 % (ref 19.6–45.3)
MCH RBC QN AUTO: 29.1 PG (ref 26.6–33)
MCHC RBC AUTO-ENTMCNC: 31.8 G/DL (ref 31.5–35.7)
MCV RBC AUTO: 91.4 FL (ref 79–97)
MONOCYTES # BLD AUTO: 2.59 10*3/MM3 (ref 0.1–0.9)
MONOCYTES NFR BLD AUTO: 15.2 % (ref 5–12)
NEUTROPHILS NFR BLD AUTO: 58.1 % (ref 42.7–76)
NEUTROPHILS NFR BLD AUTO: 9.87 10*3/MM3 (ref 1.7–7)
PLATELET # BLD AUTO: 168 10*3/MM3 (ref 140–450)
PMV BLD AUTO: 9.2 FL (ref 6–12)
RBC # BLD AUTO: 3.13 10*6/MM3 (ref 3.77–5.28)
WBC NRBC COR # BLD: 17.01 10*3/MM3 (ref 3.4–10.8)

## 2023-10-16 PROCEDURE — 85025 COMPLETE CBC W/AUTO DIFF WBC: CPT

## 2023-10-16 PROCEDURE — 36415 COLL VENOUS BLD VENIPUNCTURE: CPT

## 2023-10-16 PROCEDURE — G0299 HHS/HOSPICE OF RN EA 15 MIN: HCPCS

## 2023-10-16 RX ORDER — PROMETHAZINE HYDROCHLORIDE 12.5 MG/1
12.5 TABLET ORAL EVERY 6 HOURS PRN
Qty: 30 TABLET | Refills: 0 | Status: SHIPPED | OUTPATIENT
Start: 2023-10-16

## 2023-10-16 NOTE — PROGRESS NOTES
Specialty Pharmacy Note: Sprycel (dasatinib)      Spoke with pt in clinic during lab appointment. Pt took her first dose of Sprycel yesterday evening and woke up this morning with N/V and headache. She has taken two doses of compazine and states it has not helped. Pt also had EKG completed on Saturday - Dr. Lerma reviewed and pt was okay to start Sprycel but she recommended pt follow-up with cardiology. Discussed with pt and pt verbalized understanding on need to follow-up with cardiologist. For N/V, pt reports ondansetron helping in the past, but there is a possible Cat C DI with sprycel and zofran (Ondansetron may enhance the QTc-prolonging effect of QT-prolonging Kinase Inhibitors). Per APRN, pt to try promethazine for nausea first as there is no documented DI with Sprycel and promethazine (LexiComp). Discussed with pt and she was okay with trying and Rx sent to pt home pharmacy. Encouraged pt to take Spyrcel with food for future doses as this may help with GI upset. Pt to continue to take APAP for headache per Dr. Lerma. Pt had no questions or concerns at this time.       Thank you,  Amy Aldridge, Pharm.D.

## 2023-10-16 NOTE — TELEPHONE ENCOUNTER
RECEIVED A PHONE CALL FROM NAUN WITH [facility] REGARDING THE PATIENT. SHE STATED SHE IS UNABLE TO DRAW A CBC ON THE PATIENT THEREFORE SHE IS REQUESTING WHAT THE PATIENT SHOULD DO. I ASKED IF THE PATIENT WOULD BE WILLING TO COME INTO THE OFFICE TO HAVE HER BLOOD DRAWN; NAUN STATED THE PATIENT STATED SHE IS WILLING. I CONFIRMED. PATIENT SCHEDULED FOR 1110 PER PATIENT REQUEST. NAUN CONFIRMED.

## 2023-10-18 ENCOUNTER — TELEPHONE (OUTPATIENT)
Dept: ONCOLOGY | Facility: CLINIC | Age: 48
End: 2023-10-18

## 2023-10-18 NOTE — TELEPHONE ENCOUNTER
Caller: MEGAN    Relationship: [employer]    Best call back number: [phone]    Who are you requesting to speak with (clinical staff, provider,  specific staff member): CLINICAL     What was the call regarding: MEGAN HAS FAXED HOME HEALTH ORDERS TO BE SIGNED 3 TIMES, SENDING A FOURTH FAX TODAY    SHE IS NEEDING A CALL BACK TO CONFIRM THAT THE OFFICE HAS RECEIVED THEM     IF FAXES HAVE NOT BEEN RECEIVED SHE IS NEEDING ALTERNATIVE TO GET ORDERS SENT OVER       PLEASE ADVISE

## 2023-10-23 ENCOUNTER — HOME CARE VISIT (OUTPATIENT)
Dept: HOME HEALTH SERVICES | Facility: HOME HEALTHCARE | Age: 48
End: 2023-10-23
Payer: COMMERCIAL

## 2023-10-23 ENCOUNTER — SPECIALTY PHARMACY (OUTPATIENT)
Dept: PHARMACY | Facility: HOSPITAL | Age: 48
End: 2023-10-23
Payer: MEDICAID

## 2023-10-23 ENCOUNTER — LAB REQUISITION (OUTPATIENT)
Dept: LAB | Facility: HOSPITAL | Age: 48
End: 2023-10-23
Payer: MEDICARE

## 2023-10-23 VITALS
OXYGEN SATURATION: 94 % | RESPIRATION RATE: 19 BRPM | SYSTOLIC BLOOD PRESSURE: 146 MMHG | TEMPERATURE: 98.5 F | HEART RATE: 89 BPM | DIASTOLIC BLOOD PRESSURE: 64 MMHG

## 2023-10-23 DIAGNOSIS — C92.10 CHRONIC MYELOID LEUKEMIA, BCR/ABL-POSITIVE, NOT HAVING ACHIEVED REMISSION: ICD-10-CM

## 2023-10-23 LAB
ANISOCYTOSIS BLD QL: ABNORMAL
BASOPHILS # BLD MANUAL: 0.1 10*3/MM3 (ref 0–0.2)
BASOPHILS NFR BLD MANUAL: 1 % (ref 0–1.5)
DEPRECATED RDW RBC AUTO: 52.9 FL (ref 37–54)
ERYTHROCYTE [DISTWIDTH] IN BLOOD BY AUTOMATED COUNT: 17.3 % (ref 12.3–15.4)
HCT VFR BLD AUTO: 26.3 % (ref 34–46.6)
HGB BLD-MCNC: 8.7 G/DL (ref 12–15.9)
LARGE PLATELETS: ABNORMAL
LYMPHOCYTES # BLD MANUAL: 3.5 10*3/MM3 (ref 0.7–3.1)
LYMPHOCYTES NFR BLD MANUAL: 7 % (ref 5–12)
MCH RBC QN AUTO: 28.6 PG (ref 26.6–33)
MCHC RBC AUTO-ENTMCNC: 32.9 G/DL (ref 31.5–35.7)
MCV RBC AUTO: 86.9 FL (ref 79–97)
MONOCYTES # BLD: 0.72 10*3/MM3 (ref 0.1–0.9)
NEUTROPHILS # BLD AUTO: 5.97 10*3/MM3 (ref 1.7–7)
NEUTROPHILS NFR BLD MANUAL: 58 % (ref 42.7–76)
PLATELET # BLD AUTO: 175 10*3/MM3 (ref 140–450)
PMV BLD AUTO: 7.8 FL (ref 6–12)
POIKILOCYTOSIS BLD QL SMEAR: ABNORMAL
RBC # BLD AUTO: 3.03 10*6/MM3 (ref 3.77–5.28)
SCAN SLIDE: NORMAL
VARIANT LYMPHS NFR BLD MANUAL: 34 % (ref 19.6–45.3)
WBC MORPH BLD: NORMAL
WBC NRBC COR # BLD: 10.3 10*3/MM3 (ref 3.4–10.8)

## 2023-10-23 PROCEDURE — 85025 COMPLETE CBC W/AUTO DIFF WBC: CPT | Performed by: INTERNAL MEDICINE

## 2023-10-23 PROCEDURE — G0299 HHS/HOSPICE OF RN EA 15 MIN: HCPCS

## 2023-10-23 NOTE — HOME HEALTH
Routine Visit Note:    Skill/education provided cardiopulmonary assessment, med education, pain assessment, fall prevention education, labs per md order     Patient/caregiver response: pt stated understanding    Plan for next visit: cardiopulmonary assessment, med education, pain assess,ment, fall prevention education, labs per md order     Other pertinent info: monitor for vomiting

## 2023-10-24 ENCOUNTER — OFFICE VISIT (OUTPATIENT)
Dept: ONCOLOGY | Facility: CLINIC | Age: 48
End: 2023-10-24
Payer: MEDICAID

## 2023-10-24 VITALS
DIASTOLIC BLOOD PRESSURE: 79 MMHG | SYSTOLIC BLOOD PRESSURE: 119 MMHG | TEMPERATURE: 98 F | OXYGEN SATURATION: 99 % | BODY MASS INDEX: 21.11 KG/M2 | HEART RATE: 115 BPM | RESPIRATION RATE: 18 BRPM | HEIGHT: 64 IN

## 2023-10-24 DIAGNOSIS — F41.9 ANXIETY: ICD-10-CM

## 2023-10-24 DIAGNOSIS — C92.10 CML (CHRONIC MYELOCYTIC LEUKEMIA): Primary | ICD-10-CM

## 2023-10-24 RX ORDER — ALPRAZOLAM 0.5 MG/1
0.5 TABLET ORAL NIGHTLY PRN
Qty: 30 TABLET | Refills: 0 | Status: SHIPPED | OUTPATIENT
Start: 2023-10-24

## 2023-10-24 NOTE — PROGRESS NOTES
Hematology/Oncology Outpatient Follow Up    PATIENT NAME:Nieves Mc  :1975  MRN: [id number]  PRIMARY CARE PHYSICIAN: Alida Latham APRN  REFERRING PHYSICIAN: Alida Latham, *    Chief Complaint   Patient presents with    Follow-up     CML (chronic myelocytic leukemia)        HISTORY OF PRESENT ILLNESS:         Patient is a 48 y.o. female with PMH significant for CML on treatment with Imatinib.  Patient stated that she typically feels poorly with Imatinib with frequent nausea and vomiting.  Also complained of weakness and fatigue.  Patient presented to ED on 10/22/18 with complaints of an elevated blood sugar around 400.  Stated she felt poorly and has had a productive cough with yellow sputum.  Complained of nausea and stated she was unable to keep any food down anytime she ate.  The patient reported subjective fever without chills.  She stated she had been coughing so hard that she was vomiting.  She denied hematemesis.  Denied diarrhea, constipation or blood in her stool.       Patient’s chest x-ray showed no evidence of acute pulmonary embolus.  The patient has ground-glass opacities throughout the lungs.  There is some air trapping noted which would appear to be   infectious.  Patient was started on antibiotics.      Hem/Onc was consulted on 10/23/18 for co-management of patient with CML.  Patient was seen by Dr. Lerma on 10/12 on previous admission for patient reported CML on Imatinib (Gleevec).  Patient reports she is under the care of Dr. Roach at [facility] in [place].  Patient was seen by Dr. Lerma in May 2018 when patient presented with hematuria, which was attributed to her Imatinib.  Dr. Lerma followed during hospitalization but then patient returned to Dr. Roach for her usual follow up.  She was again seen on 10/12.  Patient is stating she will switch to Dr. Lerma.    Review of Dr. Roach’s note:  On 18 patient had BCR-abl which was 61.326.  Patient  had been on Imatinib 400 mg daily.  She had presented in March 2018 with WBC of 24, hemoglobin 13.1, platelet count of 1,067,000.  Patient apparently had follow up BCR-abl in August 2018, results are not available for review.  Her bone marrow aspiration and biopsy was also performed at that time is currently not available for review.    11/6/18 - .  Uric acid 6.5.  BCR-abl by RT-PCR was measured at 3.36%.    Due to thrombocytosis, patient was placed on Hydroxyurea 500 mg twice a day on 12/11/18.  Platelet count juana to 900,000.  Patient was noncompliant with CBCs that were recommended.    Patient was asked to return to the office after not being able to reach her.   12/27/18 - Bone marrow aspiration and biopsy was consistent with chronic myeloid leukemia.  BCR-abl positive, chronic phase.  ABL kinase mutational analysis is currently pending on the bone marrow.    1/3/19 - Repeat CBC, WBC 17, hemoglobin 11.9, platelet count 1,072,000.  Hydroxyurea was increased to 1 gm twice a day with follow up CBC.    1/6/19 - Follow up CBC showed progressive increase in platelet to 1.1 million.  Patient was offered admission to the hospital, which she declined.  Hydroxyurea was increased to 1 gm three   times a day as of 1/6/19.   1/7/19 - EKG shows sinus tachycardia.   milliseconds.    ABL kinase on peripheral blood was ordered on 1/11/19.  Based on sequence analysis, there was no mutation detected.    2/12/19 - Patient was initiated on Tasigna 300 mg twice a day.  2/13/19 - Urinalysis was negative for hematuria.   2/22/19 -  milliseconds.  3/13/19 - EKG with QTC is 413 milliseconds.  Nonspecific changes noted.    4/18/19 - EKG showed QTC prolonged to 453 milliseconds.  This is changed from prior.  4/18/19 - Free T4 normal at 0.91.  Magnesium was 1.7.  BUN is 6.  Creatinine 0.7.  Bilirubin 2.2.  Phosphorous normal 3.7.  TSH 0.43, normal.  Free T3 was normal at 3.47.  Ionized calcium   normal at 1.23.  BCR-abl  was 0.522%.    5/7/19 - EKG showed QTC of 441 milliseconds.  QT was 366.     Patient has been lost to follow-up since 2019 for CML.  Patient states that she lost her insurance.  She also does not have any primary care physician at this time.  She is diabetic on insulin.  Patient was recently admitted to the hospital for pneumonia due to COVID-19 infection.  At that time patient made a decision to become compliant with treatment.  She is currently on to [employer]a 300 mg p.o. twice a day.  She is also on hydroxyurea 1 g twice a day.  Overall she feels better, she has more energy.  3/1/2022 white count is 53.92, hemoglobin 11.7 and platelets are 472    6/1/2022: Patient has not been seen since March 2022.  Also verified that she has not been taking to Tasigna since February 2022.  She comes in today with cough for 1 and half months, shortness of breath, denies fevers.  6/30/2022 patient had 2D echocardiogram which showed an EF of 41 to 45%.  Left ventricular cavity is mildly dilated.  She was diagnosed with nonischemic cardiomyopathy    11/18/2022: Patient was transferred from [facility] to [facility] due to cardiac failure.  She was then seen by NP [facility] hematology department.  Patient underwent tumor aspiration and biopsy at that time did not show chronic myeloid leukemia in accelerated phase markedly hypercellular marrow with megakaryocytic and myeloid hyperplasia with atypia and increased basophils blasts are not increased less than 1% moderate reticulin fibrosis.  According to the patient hydroxyurea was discontinued she was prescribed Gleevec 600 mg daily but she has only been taking 400 mg as she cannot find other milligram tablets.  She is already been pump inserted into her pelvic area.  She continues to experience nausea which resulted in her not taking the baclofen she should.  1/12/2023: WBC 17.64, hemoglobin 10.2, MCV 88.8, platelets 458,000.  On Gleevec 600 mg daily and  hydroxyurea 500 mg twice daily.  2023: WBC 10.67, hemoglobin 10.0, MCV 86.4, platelets 370,000.  Patient on Gleevec 600 mg daily and hydroxyurea 500 mg once a day.  Patient instructed at the visit to discontinue her hydroxyurea.  2023: WBC 17.75, hemoglobin 10.4, MCV 87.2, platelets 425,000.  On Gleevec 600 mg daily.  3/10/2023: 2D echocardiogram showing EF of 44% EKG showing sinus tachycardia QTc of 491  10/5/2023: Patient was initiated on so 100 mg p.o. daily    Past Medical History:   Diagnosis Date    Bone pain     Chronic constipation 2023    COVID-19 virus infection 2022    Diabetes mellitus     Diabetic gastroparesis 2023    Extremity pain     Carlin. legs pain    Fall during current hospitalization 2023    Leg pain     left leg greater    Migraine     Pubic ramus fracture, left, closed, initial encounter 2023    Pulmonary embolism     Vision loss     doing surgery       Past Surgical History:   Procedure Laterality Date    BONE MARROW BIOPSY      BREAST SURGERY      BRONCHOSCOPY N/A 2022    Procedure: BRONCHOSCOPY bilateral lung washing;  Surgeon: Charlene Camara MD;  Location: [facility] ENDOSCOPY;  Service: Pulmonary;  Laterality: N/A;  post: rule out infection vs transfusion lung injury     SECTION      CHOLECYSTECTOMY      COLONOSCOPY N/A 2023    Procedure: COLONOSCOPY;  Surgeon: Freddy Villeda MD;  Location: [facility] ENDOSCOPY;  Service: Gastroenterology;  Laterality: N/A;  poor prep    ENDOSCOPY N/A 2023    Procedure: ESOPHAGOGASTRODUODENOSCOPY with biopsy x2 areas;  Surgeon: Freddy Villeda MD;  Location: [facility] ENDOSCOPY;  Service: Gastroenterology;  Laterality: N/A;  food in stomach;abnormal duodenal mucosa    EYE SURGERY      laser surgery due  to hemmorage--- 2021-- another surgery  lt eye11/15/21    RETINAL DETACHMENT SURGERY      SPINE SURGERY      Lombardi spinal block    TUBAL ABDOMINAL LIGATION           Current Outpatient Medications:      acetaminophen (TYLENOL) 325 MG tablet, Take 2 tablets by mouth Every 4 (Four) Hours As Needed. Indications: Fever, Pain, Disp: , Rfl:     ALPRAZolam (Xanax) 0.5 MG tablet, Take 1 tablet by mouth At Night As Needed for Anxiety. Indications: Feeling Anxious, Disp: 30 tablet, Rfl: 0    budesonide-formoterol (SYMBICORT) 160-4.5 MCG/ACT inhaler, Inhale 2 puffs 2 (Two) Times a Day. (Patient not taking: Reported on 10/5/2023), Disp: 10.2 g, Rfl: 1    Continuous Blood Gluc Sensor (FreeStyle Zahira 2 Sensor) misc, , Disp: , Rfl:     dapagliflozin Propanediol (Farxiga) 10 MG tablet, Take 10 mg by mouth Daily. (Patient not taking: Reported on 10/5/2023), Disp: 90 tablet, Rfl: 3    dasatinib (SPRYCEL) 100 MG chemo tablet, Take 1 tablet by mouth Daily. (Patient not taking: Reported on 10/5/2023), Disp: 30 tablet, Rfl: 5    dasatinib (SPRYCEL) 100 MG chemo tablet, Take 100 mg by mouth Daily. Indications: Acute Lymphocytic Leukemia, Disp: , Rfl:     furosemide (LASIX) 40 MG tablet, Take 1 tablet by mouth Daily. Indications: Edema (Patient not taking: Reported on 10/5/2023), Disp: , Rfl:     gabapentin (NEURONTIN) 300 MG capsule, Take 2 capsules by mouth 3 (Three) Times a Day. Indications: Diabetes with Nerve Disease, Disp: , Rfl:     Insulin Glargine (BASAGLAR KWIKPEN) 100 UNIT/ML injection pen, Inject 20 Units under the skin into the appropriate area as directed Daily. (Patient not taking: Reported on 9/19/2023), Disp: 10 mL, Rfl: 3    midodrine (PROAMATINE) 10 MG tablet, Take 1 tablet by mouth Every 8 (Eight) Hours., Disp: 90 tablet, Rfl: 0    mirtazapine (REMERON) 15 MG tablet, Take 1 tablet by mouth every night at bedtime. Indications: Major Depressive Disorder, Disp: 30 tablet, Rfl: 2    ondansetron ODT (ZOFRAN-ODT) 8 MG disintegrating tablet, Place 1 tablet on the tongue Every 8 (Eight) Hours As Needed for Nausea or Vomiting., Disp: 20 tablet, Rfl: 2    pain patient supplied pump, 0.375 mL/hr by Intrathecal route Daily.  Active pump Prescriber: Dr. Shelton - pain management  Med name/ concentration: Dilaudid  Last refill: due soon Lockout period: every 4 hours   Indications: chronic pain, Disp: , Rfl:     prochlorperazine (COMPAZINE) 10 MG tablet, Take 1 tablet by mouth Every 6 (Six) Hours As Needed for Nausea or Vomiting. (Patient not taking: Reported on 10/5/2023), Disp: 30 tablet, Rfl: 5    promethazine (PHENERGAN) 12.5 MG tablet, Take 1 tablet by mouth Every 6 (Six) Hours As Needed for Nausea or Vomiting., Disp: 30 tablet, Rfl: 0    tiZANidine (ZANAFLEX) 4 MG tablet, Take 1 tablet by mouth Daily. Indications: Musculoskeletal Pain, Disp: , Rfl:   No current facility-administered medications for this visit.    Facility-Administered Medications Ordered in Other Visits:     acetaminophen (TYLENOL) tablet 650 mg, 650 mg, Oral, Once, Denisha Gill APRN    No Known Allergies    Family History   Problem Relation Age of Onset    Diabetes Mother     Diabetes Maternal Grandmother     Heart attack Maternal Grandmother     Stroke Maternal Grandmother        Cancer-related family history is not on file.    Social History     Tobacco Use    Smoking status: Never    Smokeless tobacco: Never   Vaping Use    Vaping Use: Never used   Substance Use Topics    Alcohol use: No    Drug use: No       I have reviewed and confirmed the accuracy of the patient's history: Chief complaint, HPI, ROS, and Subjective as entered by the MA/LPN/RN. Meghann Lerma MD 10/24/23        SUBJECTIVE:    Pain management is better on 10 mg of oxycodone every 4 hours.  Patient to follow-up with her pain management physician in [place] in July.     She was recently seen by her pain physician in [place].  Pain pump was increased    Patient is here today for routine follow-up.  She discontinued ponatinib due to generalized body aches and pains and also her fear about bone marrow suppression    She has been on Sprycel for approximately 2 weeks.  She  "complains of significant nausea and vomiting.  She only takes her nausea medicine once a day.  I advised her to increase the frequency of antinausea medications.    REVIEW OF SYSTEMS:    Review of Systems   Constitutional: Negative for chills and fever.   HENT: Negative for ear pain, mouth sores, nosebleeds and sore throat.    Eyes: Negative for photophobia and visual disturbance.   Respiratory: Negative for wheezing and stridor.    Cardiovascular: Negative for chest pain and palpitations.   Gastrointestinal: Negative for abdominal pain, diarrhea, nausea and vomiting.   Endocrine: Negative for cold intolerance and heat intolerance.   Genitourinary: Negative for dysuria and hematuria.   Musculoskeletal: Negative for joint swelling and neck stiffness.  Positive for back and leg pain.   Skin: Negative for color change and rash.   Neurological: Negative for seizures and syncope.   Hematological: Negative for adenopathy.        No obvious bleeding   Psychiatric/Behavioral: Negative for agitation, confusion and hallucinations. Positive for insomnia, anxiety, depression.      OBJECTIVE:    Vitals:    10/24/23 1440   BP: 119/79   Pulse: 115   Resp: 18   Temp: 98 °F (36.7 °C)   TempSrc: Infrared   SpO2: 99%   Weight: Comment: unable to weigh   Height: 162.6 cm (64\")   PainSc:   9             Body mass index is 21.11 kg/m².    ECOG  (1) Restricted in physically strenuous activity, ambulatory and able to do work of light nature    Physical Exam  Vitals and nursing note reviewed.   Constitutional:       General: She is not in acute distress.     Appearance: She is not diaphoretic.   HENT:      Head: Normocephalic and atraumatic.   Eyes:      General: No scleral icterus.        Right eye: No discharge.         Left eye: No discharge.      Conjunctiva/sclera: Conjunctivae normal.   Neck:      Thyroid: No thyromegaly.   Cardiovascular:      Rate and Rhythm: Normal rate and regular rhythm.      Heart sounds: Normal heart sounds. No " friction rub. No gallop.    Pulmonary:      Effort: Pulmonary effort is normal. No respiratory distress.      Breath sounds: No stridor. No wheezing.   Abdominal:      General: Bowel sounds are normal.      Palpations: Abdomen is soft. There is no mass.      Tenderness: There is no abdominal tenderness. There is no guarding or rebound.   Musculoskeletal:         General: No tenderness. Normal range of motion.      Cervical back: Normal range of motion and neck supple.   Lymphadenopathy:      Cervical: No cervical adenopathy.   Skin:     General: Skin is warm.      Findings: No erythema or rash.   Neurological:      Mental Status: She is alert and oriented to person, place, and time.      Motor: No abnormal muscle tone.   Psychiatric:         Behavior: Behavior      I have reexamined the patient and the results are consistent with the previously documented exam. Meghann Lerma MD      RECENT LABS    WBC   Date Value Ref Range Status   10/23/2023 10.30 3.40 - 10.80 10*3/mm3 Final     RBC   Date Value Ref Range Status   10/23/2023 3.03 (L) 3.77 - 5.28 10*6/mm3 Final     Hemoglobin   Date Value Ref Range Status   10/23/2023 8.7 (L) 12.0 - 15.9 g/dL Final     Hematocrit   Date Value Ref Range Status   10/23/2023 26.3 (L) 34.0 - 46.6 % Final     MCV   Date Value Ref Range Status   10/23/2023 86.9 79.0 - 97.0 fL Final     MCH   Date Value Ref Range Status   10/23/2023 28.6 26.6 - 33.0 pg Final     MCHC   Date Value Ref Range Status   10/23/2023 32.9 31.5 - 35.7 g/dL Final     RDW   Date Value Ref Range Status   10/23/2023 17.3 (H) 12.3 - 15.4 % Final     RDW-SD   Date Value Ref Range Status   10/23/2023 52.9 37.0 - 54.0 fl Final     MPV   Date Value Ref Range Status   10/23/2023 7.8 6.0 - 12.0 fL Final     Platelets   Date Value Ref Range Status   10/23/2023 175 140 - 450 10*3/mm3 Final     Neutrophil %   Date Value Ref Range Status   10/16/2023 58.1 42.7 - 76.0 % Final     Lymphocyte %   Date Value Ref Range  Status   10/16/2023 25.6 19.6 - 45.3 % Final     Monocyte %   Date Value Ref Range Status   10/16/2023 15.2 (H) 5.0 - 12.0 % Final     Eosinophil %   Date Value Ref Range Status   10/16/2023 0.5 0.3 - 6.2 % Final     Basophil %   Date Value Ref Range Status   10/16/2023 0.6 0.0 - 1.5 % Final     Neutrophils Absolute   Date Value Ref Range Status   10/23/2023 5.97 1.70 - 7.00 10*3/mm3 Final     External Neutrophils Abs   Date Value Ref Range Status   11/30/2022 7.6 (H) 1.7 - 6.0 x10(3)/ul Final     Neutrophils, Absolute   Date Value Ref Range Status   10/16/2023 9.87 (H) 1.70 - 7.00 10*3/mm3 Final     Lymphocytes, Absolute   Date Value Ref Range Status   10/16/2023 4.36 (H) 0.70 - 3.10 10*3/mm3 Final     Monocytes, Absolute   Date Value Ref Range Status   10/16/2023 2.59 (H) 0.10 - 0.90 10*3/mm3 Final     Eosinophils, Absolute   Date Value Ref Range Status   10/16/2023 0.09 0.00 - 0.40 10*3/mm3 Final     Basophils Absolute   Date Value Ref Range Status   10/23/2023 0.10 0.00 - 0.20 10*3/mm3 Final     Basophils, Absolute   Date Value Ref Range Status   10/16/2023 0.10 0.00 - 0.20 10*3/mm3 Final     nRBC   Date Value Ref Range Status   10/09/2023 0.2 0.0 - 0.2 /100 WBC Final       Lab Results   Component Value Date    GLUCOSE 350 (H) 10/09/2023    BUN 20 10/09/2023    CREATININE 0.61 10/09/2023    EGFRIFNONA 133 02/02/2022    BCR 32.8 (H) 10/09/2023    K 4.3 10/09/2023    CO2 27.0 10/09/2023    CALCIUM 10.6 (H) 10/09/2023    ALBUMIN 3.9 10/09/2023    LABIL2 1.0 04/18/2019    AST 36 (H) 10/09/2023    ALT 49 (H) 10/09/2023           ASSESSMENT    Accelerated phase CML, status post bone marrow biopsy on 11/3/2022.  Patient was on ponatinib but developed significant pancytopenia and therefore treatment has been held.  Patient is not able to tolerate ponatinib despite significantly low doses at 10 mg every other day.  We will therefore discontinue ponatinib  Continue Sprycel 100 mg p.o. daily   Nausea vomiting secondary to  Sprycel: Increase antinausea medication  Pain management issues, patient initially refusing to follow-up with her physician at [facility].  Patient has been seen by her pain physician.  She will continue follow-up with our pain physicians  Pancytopenia: Counts are slowly improving  BCR ABL kinase mutation assay was negative  Cardiomyopathy her most recent echo shows an EF of 44% which is an improvement from 26 to 30%.  To follow-up with her cardiologist  Anxiety: Refill angiolytics today  Pancytopenia secondary to med, CML not requiring blood transfusion now.  For now continue to observe she will remain off TKI's  CML in chronic phase with no significant hematologic or molecular or cytogenetic response on   Imatinib.  ABL kinase mutational analysis was negative.  We  have represcribed to Tasigna 300 mg twice a day.  Patient has been noncompliant with treatments and ended up in the hospital with hyperleukocytosis, peripheral blood blasts.  Bone marrow biopsy suggest that she remains in chronic phase.  Continue to Tasiigna at the current doses.  Hydroxyurea has been discontinued.  Uncontrolled diabetes type 1, followed at the [facility] by Dr. Calles  Chronic anemia: Stable, multifactorial from CML, to Tasigna, hydroxyurea.  This has improved  Insomnia  Situational anxiety, not suicidal.  Continue Xanax 0.5 mg once daily  History of medical noncompliance  Pulmonary embolism: Xarelto restarted  Recent COVID-19 pneumonia  History of prolonged QTC        Plans    Continue Sprycel  Refill Ativan  Mugard mouthwash for mucositis  Continue Phenergan for nausea.  Change Zofran to 4 mg every 8 hours as needed for nausea to decrease risk of prolonged QT interval.  We will check EKG in 2 weeks a day before her next appointment  Would request for HLA matched platelet transfusion tomorrow  Change CBCs to once a week to be drawn by visiting nurses.  Continue the same  Discontinue  ponatinib  Patient to establish with new PCP.  She was given numbers to Dr. Christina Stewart's office to call  CMP today  Discontinue oxycodone.  Pain management will take over her pain medications  BCR ABL kinase mutational analysis was negative  Request [facility]  DC trazodone 25 mg due to QT prolongation  Continue to follow-up with cardiology in regards to dosing of her diuretics.  EKG reviewed.  She has slight prolongation of QT.  We will discontinue Zofran and Phenergan and use Compazine as needed for nausea.  Prescription has been sent to her pharmacy  Refill Xanax  All questions answered  Continue to follow-up with PCP per routine  Follow-up with pain management per routine  Follow-up 2 weeks

## 2023-10-27 ENCOUNTER — SPECIALTY PHARMACY (OUTPATIENT)
Dept: PHARMACY | Facility: HOSPITAL | Age: 48
End: 2023-10-27
Payer: MEDICAID

## 2023-10-27 DIAGNOSIS — C95.90 LEUKEMIA NOT HAVING ACHIEVED REMISSION, UNSPECIFIED LEUKEMIA TYPE: ICD-10-CM

## 2023-10-27 DIAGNOSIS — C92.10 BLAST CRISIS PHASE OF CHRONIC MYELOID LEUKEMIA: ICD-10-CM

## 2023-10-27 RX ORDER — ONDANSETRON 4 MG/1
4 TABLET, ORALLY DISINTEGRATING ORAL EVERY 8 HOURS PRN
Qty: 30 TABLET | Refills: 2 | Status: SHIPPED | OUTPATIENT
Start: 2023-10-27

## 2023-10-27 RX ORDER — PROCHLORPERAZINE 25 MG
25 SUPPOSITORY, RECTAL RECTAL EVERY 12 HOURS PRN
Qty: 10 SUPPOSITORY | Refills: 2 | Status: SHIPPED | OUTPATIENT
Start: 2023-10-27

## 2023-10-27 RX ORDER — PROCHLORPERAZINE MALEATE 10 MG
10 TABLET ORAL EVERY 6 HOURS PRN
Qty: 30 TABLET | Refills: 2 | Status: SHIPPED | OUTPATIENT
Start: 2023-10-27

## 2023-10-27 NOTE — PROGRESS NOTES
Specialty Pharmacy Note: Sprycel (dasatinib)    Contacted patient today via telephone to check adherence and side effects of Sprycel (dasatinib) that patient is taking for chronic myelocytic leukemia treatment.  Patient reports multiple side effects with the most concerning being left sided chest pain when she breathes in.  Discussed this with her oncologist, Dr. Lerma and patient was advised to go to the ER.  Patient said that she doesn't want to go to ER and will go if it worsens.  Let her know that even if it doesn't worsen that it could be indicative of needing treatment or a sign of blood clot in her lungs.  She said that if it continues today, she will go to ER even if it remains constant.  Patient also having nausea that is uncontrolled with her current regimen and has had two days in the past week that she vomited more than 3-4 times in a day.  She reports that she is not taking her Sprycel on days that she vomits more than once and she also doesn't take it the next day to let her body rest.  Dr. Lerma is prescribing ondansetron ODT to replace the tablet and prochlorperazine to replace the promethazine to see if nausea can be controlled.  Reviewed directions with patient.  Patient also has mouth sores and Mugard was not covered by her insurance.  Magic Mouth was is being prescribed to replace it and even though she doesn't like the taste of this, she will use it.    All questions answered and patient advised that her oncologist recommends ER for pleuritic chest pain.     Thanks,    Ros CALHOUN, PharmD

## 2023-10-30 ENCOUNTER — HOME CARE VISIT (OUTPATIENT)
Dept: HOME HEALTH SERVICES | Facility: HOME HEALTHCARE | Age: 48
End: 2023-10-30
Payer: MEDICAID

## 2023-10-30 ENCOUNTER — HOME CARE VISIT (OUTPATIENT)
Dept: HOME HEALTH SERVICES | Facility: HOME HEALTHCARE | Age: 48
End: 2023-10-30
Payer: COMMERCIAL

## 2023-10-30 ENCOUNTER — SPECIALTY PHARMACY (OUTPATIENT)
Dept: PHARMACY | Facility: HOSPITAL | Age: 48
End: 2023-10-30
Payer: MEDICAID

## 2023-10-30 ENCOUNTER — LAB REQUISITION (OUTPATIENT)
Dept: LAB | Facility: HOSPITAL | Age: 48
End: 2023-10-30
Payer: MEDICARE

## 2023-10-30 VITALS
TEMPERATURE: 98.2 F | SYSTOLIC BLOOD PRESSURE: 122 MMHG | HEART RATE: 109 BPM | DIASTOLIC BLOOD PRESSURE: 56 MMHG | OXYGEN SATURATION: 92 % | RESPIRATION RATE: 19 BRPM

## 2023-10-30 DIAGNOSIS — C92.10 CHRONIC MYELOID LEUKEMIA, BCR/ABL-POSITIVE, NOT HAVING ACHIEVED REMISSION: ICD-10-CM

## 2023-10-30 DIAGNOSIS — D64.9 ANEMIA, UNSPECIFIED TYPE: Primary | ICD-10-CM

## 2023-10-30 LAB
ALBUMIN SERPL-MCNC: 3.8 G/DL (ref 3.5–5.2)
ALBUMIN/GLOB SERPL: 1.3 G/DL
ALP SERPL-CCNC: 377 U/L (ref 39–117)
ALT SERPL W P-5'-P-CCNC: 58 U/L (ref 1–33)
ANION GAP SERPL CALCULATED.3IONS-SCNC: 9 MMOL/L (ref 5–15)
AST SERPL-CCNC: 40 U/L (ref 1–32)
BASOPHILS # BLD AUTO: 0.1 10*3/MM3 (ref 0–0.2)
BASOPHILS NFR BLD AUTO: 0.9 % (ref 0–1.5)
BILIRUB SERPL-MCNC: 0.6 MG/DL (ref 0–1.2)
BUN SERPL-MCNC: 30 MG/DL (ref 6–20)
BUN/CREAT SERPL: 37.5 (ref 7–25)
CALCIUM SPEC-SCNC: 9.9 MG/DL (ref 8.6–10.5)
CHLORIDE SERPL-SCNC: 101 MMOL/L (ref 98–107)
CO2 SERPL-SCNC: 26 MMOL/L (ref 22–29)
CREAT SERPL-MCNC: 0.8 MG/DL (ref 0.57–1)
DEPRECATED RDW RBC AUTO: 52.5 FL (ref 37–54)
EGFRCR SERPLBLD CKD-EPI 2021: 91 ML/MIN/1.73
EOSINOPHIL # BLD AUTO: 0.1 10*3/MM3 (ref 0–0.4)
EOSINOPHIL NFR BLD AUTO: 1.2 % (ref 0.3–6.2)
ERYTHROCYTE [DISTWIDTH] IN BLOOD BY AUTOMATED COUNT: 17.3 % (ref 12.3–15.4)
GLOBULIN UR ELPH-MCNC: 3 GM/DL
GLUCOSE SERPL-MCNC: 253 MG/DL (ref 65–99)
HCT VFR BLD AUTO: 21 % (ref 34–46.6)
HGB BLD-MCNC: 6.9 G/DL (ref 12–15.9)
LYMPHOCYTES # BLD AUTO: 1.7 10*3/MM3 (ref 0.7–3.1)
LYMPHOCYTES NFR BLD AUTO: 24.5 % (ref 19.6–45.3)
MCH RBC QN AUTO: 28.7 PG (ref 26.6–33)
MCHC RBC AUTO-ENTMCNC: 32.7 G/DL (ref 31.5–35.7)
MCV RBC AUTO: 87.8 FL (ref 79–97)
MONOCYTES # BLD AUTO: 0.8 10*3/MM3 (ref 0.1–0.9)
MONOCYTES NFR BLD AUTO: 12 % (ref 5–12)
NEUTROPHILS NFR BLD AUTO: 4.3 10*3/MM3 (ref 1.7–7)
NEUTROPHILS NFR BLD AUTO: 61.4 % (ref 42.7–76)
NRBC BLD AUTO-RTO: 0 /100 WBC (ref 0–0.2)
PLATELET # BLD AUTO: 87 10*3/MM3 (ref 140–450)
PMV BLD AUTO: 7.6 FL (ref 6–12)
POTASSIUM SERPL-SCNC: 4.9 MMOL/L (ref 3.5–5.2)
PROT SERPL-MCNC: 6.8 G/DL (ref 6–8.5)
RBC # BLD AUTO: 2.4 10*6/MM3 (ref 3.77–5.28)
SODIUM SERPL-SCNC: 136 MMOL/L (ref 136–145)
WBC NRBC COR # BLD: 7 10*3/MM3 (ref 3.4–10.8)

## 2023-10-30 PROCEDURE — G0299 HHS/HOSPICE OF RN EA 15 MIN: HCPCS

## 2023-10-30 PROCEDURE — 85025 COMPLETE CBC W/AUTO DIFF WBC: CPT | Performed by: INTERNAL MEDICINE

## 2023-10-30 PROCEDURE — 80053 COMPREHEN METABOLIC PANEL: CPT | Performed by: INTERNAL MEDICINE

## 2023-10-30 RX ORDER — DIPHENHYDRAMINE HCL 25 MG
25 CAPSULE ORAL ONCE
Status: CANCELLED | OUTPATIENT
Start: 2023-10-30 | End: 2023-10-30

## 2023-10-30 RX ORDER — SODIUM CHLORIDE 9 MG/ML
250 INJECTION, SOLUTION INTRAVENOUS AS NEEDED
Status: CANCELLED | OUTPATIENT
Start: 2023-10-30

## 2023-10-30 RX ORDER — ACETAMINOPHEN 325 MG/1
650 TABLET ORAL ONCE
Status: CANCELLED | OUTPATIENT
Start: 2023-10-30 | End: 2023-10-30

## 2023-10-30 NOTE — PROGRESS NOTES
Specialty Pharmacy Note: Sprycel    Patient to have transfusion due to critical hemoglobin.  CAMILA Mendez is working with ambulatory care to schedule.  Pharmacy will continue to follow patient.      Thanks,    Ros CALHOUN, PharmD

## 2023-10-30 NOTE — PROGRESS NOTES
Orders for blood transfusion received from [facility]. Attempted to call pt to see if she would be agreeable to blood transfusion. No answer, detailed message with callback number for myself and [facility] left. Called [facility] to notify that infusion is needed. Spoke to Micaela who stated that they would call the pt to schedule. Received a call back from the pt. I advised her that the [facility] will be calling her to schedule. She verbalized understanding.

## 2023-10-31 ENCOUNTER — HOME CARE VISIT (OUTPATIENT)
Dept: HOME HEALTH SERVICES | Facility: HOME HEALTHCARE | Age: 48
End: 2023-10-31
Payer: COMMERCIAL

## 2023-10-31 ENCOUNTER — HOSPITAL ENCOUNTER (OUTPATIENT)
Dept: INFUSION THERAPY | Facility: HOSPITAL | Age: 48
Setting detail: OBSERVATION
Discharge: HOME | End: 2023-10-31
Attending: INTERNAL MEDICINE | Admitting: INTERNAL MEDICINE
Payer: MEDICARE

## 2023-10-31 DIAGNOSIS — C92.10 CML (CHRONIC MYELOCYTIC LEUKEMIA): ICD-10-CM

## 2023-10-31 DIAGNOSIS — C92.10 BLAST CRISIS PHASE OF CHRONIC MYELOID LEUKEMIA: ICD-10-CM

## 2023-10-31 DIAGNOSIS — D64.9 ANEMIA, UNSPECIFIED TYPE: ICD-10-CM

## 2023-10-31 PROBLEM — Z51.89 ENCOUNTER FOR BLOOD TRANSFUSION: Status: ACTIVE | Noted: 2023-10-31

## 2023-10-31 LAB
ABO GROUP BLD: NORMAL
ANTI-E: NORMAL
ANTI-JKA: NORMAL
ANTI-S: NORMAL
BLD GP AB SCN SERPL QL: POSITIVE
HCT VFR BLD AUTO: 25.4 % (ref 34–46.6)
HGB BLD-MCNC: 8.3 G/DL (ref 12–15.9)
NONSPECIFIC GEL REACTION: NORMAL
RH BLD: POSITIVE
T&S EXPIRATION DATE: NORMAL

## 2023-10-31 PROCEDURE — 86850 RBC ANTIBODY SCREEN: CPT | Performed by: NURSE PRACTITIONER

## 2023-10-31 PROCEDURE — 86902 BLOOD TYPE ANTIGEN DONOR EA: CPT

## 2023-10-31 PROCEDURE — 86901 BLOOD TYPING SEROLOGIC RH(D): CPT | Performed by: NURSE PRACTITIONER

## 2023-10-31 PROCEDURE — 85018 HEMOGLOBIN: CPT | Performed by: INTERNAL MEDICINE

## 2023-10-31 PROCEDURE — 86900 BLOOD TYPING SEROLOGIC ABO: CPT | Performed by: NURSE PRACTITIONER

## 2023-10-31 PROCEDURE — 63710000001 DIPHENHYDRAMINE PER 50 MG: Performed by: NURSE PRACTITIONER

## 2023-10-31 PROCEDURE — 85014 HEMATOCRIT: CPT | Performed by: INTERNAL MEDICINE

## 2023-10-31 PROCEDURE — 86900 BLOOD TYPING SEROLOGIC ABO: CPT

## 2023-10-31 PROCEDURE — 86870 RBC ANTIBODY IDENTIFICATION: CPT | Performed by: NURSE PRACTITIONER

## 2023-10-31 PROCEDURE — P9040 RBC LEUKOREDUCED IRRADIATED: HCPCS

## 2023-10-31 PROCEDURE — 86922 COMPATIBILITY TEST ANTIGLOB: CPT

## 2023-10-31 PROCEDURE — 36430 TRANSFUSION BLD/BLD COMPNT: CPT

## 2023-10-31 RX ORDER — SODIUM CHLORIDE 9 MG/ML
250 INJECTION, SOLUTION INTRAVENOUS AS NEEDED
Status: DISCONTINUED | OUTPATIENT
Start: 2023-10-31 | End: 2023-11-02 | Stop reason: HOSPADM

## 2023-10-31 RX ORDER — DIPHENHYDRAMINE HCL 25 MG
25 CAPSULE ORAL ONCE
Status: COMPLETED | OUTPATIENT
Start: 2023-10-31 | End: 2023-10-31

## 2023-10-31 RX ORDER — ACETAMINOPHEN 325 MG/1
650 TABLET ORAL ONCE
Status: COMPLETED | OUTPATIENT
Start: 2023-10-31 | End: 2023-10-31

## 2023-10-31 RX ADMIN — DIPHENHYDRAMINE HYDROCHLORIDE 25 MG: 25 CAPSULE ORAL at 17:37

## 2023-10-31 RX ADMIN — ACETAMINOPHEN 650 MG: 325 TABLET, FILM COATED ORAL at 17:37

## 2023-10-31 NOTE — HOME HEALTH
Routine Visit Note:    Skill/education provided:cardiopulmonary assessment,med education, pain assessment, fall prevention education, labs per md order    Patient/caregiver response: pt stated understanding     Plan for next visit: cardiopulmonary assessment,med education, pain assessment, fall prevention education, labs per md order    Other pertinent info: monitor for bleeding

## 2023-11-01 VITALS
SYSTOLIC BLOOD PRESSURE: 141 MMHG | DIASTOLIC BLOOD PRESSURE: 85 MMHG | OXYGEN SATURATION: 95 % | HEART RATE: 105 BPM | TEMPERATURE: 98.4 F | RESPIRATION RATE: 16 BRPM

## 2023-11-01 LAB
BH BB BLOOD EXPIRATION DATE: NORMAL
BH BB BLOOD TYPE BARCODE: 9500
BH BB DISPENSE STATUS: NORMAL
BH BB PRODUCT CODE: NORMAL
BH BB UNIT NUMBER: NORMAL
CROSSMATCH INTERPRETATION: NORMAL
UNIT  ABO: NORMAL
UNIT  RH: NORMAL

## 2023-11-04 ENCOUNTER — HOSPITAL ENCOUNTER (OUTPATIENT)
Facility: HOSPITAL | Age: 48
Setting detail: OBSERVATION
Discharge: HOME HEALTH | End: 2023-11-08
Attending: EMERGENCY MEDICINE | Admitting: INTERNAL MEDICINE
Payer: MEDICARE

## 2023-11-04 DIAGNOSIS — D69.6 THROMBOCYTOPENIA: ICD-10-CM

## 2023-11-04 DIAGNOSIS — R23.3 PETECHIAL RASH: Primary | ICD-10-CM

## 2023-11-04 LAB
ALBUMIN SERPL-MCNC: 4.1 G/DL (ref 3.5–5.2)
ALBUMIN/GLOB SERPL: 1.3 G/DL
ALP SERPL-CCNC: 484 U/L (ref 39–117)
ALT SERPL W P-5'-P-CCNC: 59 U/L (ref 1–33)
ANION GAP SERPL CALCULATED.3IONS-SCNC: 11 MMOL/L (ref 5–15)
APTT PPP: 24.2 SECONDS (ref 22.7–35.4)
AST SERPL-CCNC: 36 U/L (ref 1–32)
BASOPHILS # BLD AUTO: 0.1 10*3/MM3 (ref 0–0.2)
BASOPHILS NFR BLD AUTO: 1.1 % (ref 0–1.5)
BILIRUB SERPL-MCNC: 1.3 MG/DL (ref 0–1.2)
BUN SERPL-MCNC: 26 MG/DL (ref 6–20)
BUN/CREAT SERPL: 37.7 (ref 7–25)
CALCIUM SPEC-SCNC: 10.8 MG/DL (ref 8.6–10.5)
CHLORIDE SERPL-SCNC: 102 MMOL/L (ref 98–107)
CO2 SERPL-SCNC: 23 MMOL/L (ref 22–29)
CREAT SERPL-MCNC: 0.69 MG/DL (ref 0.57–1)
CRP SERPL-MCNC: <0.3 MG/DL (ref 0–0.5)
DEPRECATED RDW RBC AUTO: 53.4 FL (ref 37–54)
EGFRCR SERPLBLD CKD-EPI 2021: 107.2 ML/MIN/1.73
EOSINOPHIL # BLD AUTO: 0.1 10*3/MM3 (ref 0–0.4)
EOSINOPHIL NFR BLD AUTO: 1.2 % (ref 0.3–6.2)
ERYTHROCYTE [DISTWIDTH] IN BLOOD BY AUTOMATED COUNT: 17.1 % (ref 12.3–15.4)
ERYTHROCYTE [SEDIMENTATION RATE] IN BLOOD: 97 MM/HR (ref 0–20)
GLOBULIN UR ELPH-MCNC: 3.2 GM/DL
GLUCOSE BLDC GLUCOMTR-MCNC: 225 MG/DL (ref 70–105)
GLUCOSE BLDC GLUCOMTR-MCNC: 309 MG/DL (ref 70–105)
GLUCOSE SERPL-MCNC: 304 MG/DL (ref 65–99)
HBA1C MFR BLD: 8.1 % (ref 4.8–5.6)
HCT VFR BLD AUTO: 26.8 % (ref 34–46.6)
HGB BLD-MCNC: 8.8 G/DL (ref 12–15.9)
HOLD SPECIMEN: NORMAL
HOLD SPECIMEN: NORMAL
INR PPP: <0.93 (ref 0.93–1.1)
LYMPHOCYTES # BLD AUTO: 2.7 10*3/MM3 (ref 0.7–3.1)
LYMPHOCYTES NFR BLD AUTO: 43.7 % (ref 19.6–45.3)
MAGNESIUM SERPL-MCNC: 1.7 MG/DL (ref 1.6–2.6)
MCH RBC QN AUTO: 29.1 PG (ref 26.6–33)
MCHC RBC AUTO-ENTMCNC: 32.7 G/DL (ref 31.5–35.7)
MCV RBC AUTO: 88.9 FL (ref 79–97)
MONOCYTES # BLD AUTO: 0.6 10*3/MM3 (ref 0.1–0.9)
MONOCYTES NFR BLD AUTO: 10.1 % (ref 5–12)
NEUTROPHILS NFR BLD AUTO: 2.7 10*3/MM3 (ref 1.7–7)
NEUTROPHILS NFR BLD AUTO: 43.9 % (ref 42.7–76)
NRBC BLD AUTO-RTO: 0 /100 WBC (ref 0–0.2)
PHOSPHATE SERPL-MCNC: 4 MG/DL (ref 2.5–4.5)
PLATELET # BLD AUTO: 50 10*3/MM3 (ref 140–450)
PMV BLD AUTO: 7.1 FL (ref 6–12)
POTASSIUM SERPL-SCNC: 5.1 MMOL/L (ref 3.5–5.2)
PROT SERPL-MCNC: 7.3 G/DL (ref 6–8.5)
PROTHROMBIN TIME: 10 SECONDS (ref 9.6–11.7)
RBC # BLD AUTO: 3.02 10*6/MM3 (ref 3.77–5.28)
RETICS # AUTO: 0.06 10*6/MM3 (ref 0.02–0.13)
RETICS/RBC NFR AUTO: 1.98 % (ref 0.7–1.9)
SODIUM SERPL-SCNC: 136 MMOL/L (ref 136–145)
WBC NRBC COR # BLD: 6.1 10*3/MM3 (ref 3.4–10.8)
WHOLE BLOOD HOLD COAG: NORMAL

## 2023-11-04 PROCEDURE — G0378 HOSPITAL OBSERVATION PER HR: HCPCS

## 2023-11-04 PROCEDURE — 85025 COMPLETE CBC W/AUTO DIFF WBC: CPT | Performed by: EMERGENCY MEDICINE

## 2023-11-04 PROCEDURE — 83036 HEMOGLOBIN GLYCOSYLATED A1C: CPT | Performed by: NURSE PRACTITIONER

## 2023-11-04 PROCEDURE — 83010 ASSAY OF HAPTOGLOBIN QUANT: CPT | Performed by: EMERGENCY MEDICINE

## 2023-11-04 PROCEDURE — 85045 AUTOMATED RETICULOCYTE COUNT: CPT | Performed by: EMERGENCY MEDICINE

## 2023-11-04 PROCEDURE — 83735 ASSAY OF MAGNESIUM: CPT | Performed by: NURSE PRACTITIONER

## 2023-11-04 PROCEDURE — 82948 REAGENT STRIP/BLOOD GLUCOSE: CPT

## 2023-11-04 PROCEDURE — 86140 C-REACTIVE PROTEIN: CPT | Performed by: EMERGENCY MEDICINE

## 2023-11-04 PROCEDURE — 85652 RBC SED RATE AUTOMATED: CPT | Performed by: EMERGENCY MEDICINE

## 2023-11-04 PROCEDURE — 63710000001 INSULIN LISPRO (HUMAN) PER 5 UNITS: Performed by: NURSE PRACTITIONER

## 2023-11-04 PROCEDURE — 85730 THROMBOPLASTIN TIME PARTIAL: CPT | Performed by: EMERGENCY MEDICINE

## 2023-11-04 PROCEDURE — 80053 COMPREHEN METABOLIC PANEL: CPT | Performed by: EMERGENCY MEDICINE

## 2023-11-04 PROCEDURE — 85610 PROTHROMBIN TIME: CPT | Performed by: EMERGENCY MEDICINE

## 2023-11-04 PROCEDURE — 84100 ASSAY OF PHOSPHORUS: CPT | Performed by: NURSE PRACTITIONER

## 2023-11-04 PROCEDURE — 99284 EMERGENCY DEPT VISIT MOD MDM: CPT

## 2023-11-04 RX ORDER — SODIUM CHLORIDE 0.9 % (FLUSH) 0.9 %
10 SYRINGE (ML) INJECTION AS NEEDED
Status: DISCONTINUED | OUTPATIENT
Start: 2023-11-04 | End: 2023-11-08 | Stop reason: HOSPADM

## 2023-11-04 RX ORDER — GABAPENTIN 300 MG/1
600 CAPSULE ORAL 3 TIMES DAILY
Status: DISCONTINUED | OUTPATIENT
Start: 2023-11-04 | End: 2023-11-08 | Stop reason: HOSPADM

## 2023-11-04 RX ORDER — TIZANIDINE 4 MG/1
4 TABLET ORAL DAILY
Status: DISCONTINUED | OUTPATIENT
Start: 2023-11-04 | End: 2023-11-08 | Stop reason: HOSPADM

## 2023-11-04 RX ORDER — ACETAMINOPHEN 650 MG/1
650 SUPPOSITORY RECTAL EVERY 4 HOURS PRN
Status: DISCONTINUED | OUTPATIENT
Start: 2023-11-04 | End: 2023-11-08 | Stop reason: HOSPADM

## 2023-11-04 RX ORDER — ALPRAZOLAM 0.5 MG/1
0.5 TABLET ORAL NIGHTLY PRN
Status: DISCONTINUED | OUTPATIENT
Start: 2023-11-04 | End: 2023-11-08 | Stop reason: HOSPADM

## 2023-11-04 RX ORDER — BISACODYL 5 MG/1
5 TABLET, DELAYED RELEASE ORAL DAILY PRN
Status: DISCONTINUED | OUTPATIENT
Start: 2023-11-04 | End: 2023-11-08 | Stop reason: HOSPADM

## 2023-11-04 RX ORDER — ONDANSETRON 4 MG/1
4 TABLET, FILM COATED ORAL EVERY 6 HOURS PRN
Status: DISCONTINUED | OUTPATIENT
Start: 2023-11-04 | End: 2023-11-08 | Stop reason: HOSPADM

## 2023-11-04 RX ORDER — BISACODYL 10 MG
10 SUPPOSITORY, RECTAL RECTAL DAILY PRN
Status: DISCONTINUED | OUTPATIENT
Start: 2023-11-04 | End: 2023-11-08 | Stop reason: HOSPADM

## 2023-11-04 RX ORDER — INSULIN LISPRO 100 [IU]/ML
2-9 INJECTION, SOLUTION INTRAVENOUS; SUBCUTANEOUS
Status: DISCONTINUED | OUTPATIENT
Start: 2023-11-04 | End: 2023-11-06

## 2023-11-04 RX ORDER — ACETAMINOPHEN 160 MG/5ML
650 SOLUTION ORAL EVERY 4 HOURS PRN
Status: DISCONTINUED | OUTPATIENT
Start: 2023-11-04 | End: 2023-11-08 | Stop reason: HOSPADM

## 2023-11-04 RX ORDER — ACETAMINOPHEN 325 MG/1
650 TABLET ORAL EVERY 4 HOURS PRN
Status: DISCONTINUED | OUTPATIENT
Start: 2023-11-04 | End: 2023-11-08 | Stop reason: HOSPADM

## 2023-11-04 RX ORDER — ALUMINA, MAGNESIA, AND SIMETHICONE 2400; 2400; 240 MG/30ML; MG/30ML; MG/30ML
15 SUSPENSION ORAL EVERY 6 HOURS PRN
Status: DISCONTINUED | OUTPATIENT
Start: 2023-11-04 | End: 2023-11-08 | Stop reason: HOSPADM

## 2023-11-04 RX ORDER — ONDANSETRON 2 MG/ML
4 INJECTION INTRAMUSCULAR; INTRAVENOUS EVERY 6 HOURS PRN
Status: DISCONTINUED | OUTPATIENT
Start: 2023-11-04 | End: 2023-11-08 | Stop reason: HOSPADM

## 2023-11-04 RX ORDER — POLYETHYLENE GLYCOL 3350 17 G/17G
17 POWDER, FOR SOLUTION ORAL DAILY PRN
Status: DISCONTINUED | OUTPATIENT
Start: 2023-11-04 | End: 2023-11-08 | Stop reason: HOSPADM

## 2023-11-04 RX ORDER — CHOLECALCIFEROL (VITAMIN D3) 125 MCG
5 CAPSULE ORAL NIGHTLY PRN
Status: DISCONTINUED | OUTPATIENT
Start: 2023-11-04 | End: 2023-11-08 | Stop reason: HOSPADM

## 2023-11-04 RX ORDER — AMOXICILLIN 250 MG
2 CAPSULE ORAL 2 TIMES DAILY
Status: DISCONTINUED | OUTPATIENT
Start: 2023-11-04 | End: 2023-11-08 | Stop reason: HOSPADM

## 2023-11-04 RX ORDER — IBUPROFEN 600 MG/1
1 TABLET ORAL
Status: DISCONTINUED | OUTPATIENT
Start: 2023-11-04 | End: 2023-11-08 | Stop reason: HOSPADM

## 2023-11-04 RX ORDER — NICOTINE POLACRILEX 4 MG
15 LOZENGE BUCCAL
Status: DISCONTINUED | OUTPATIENT
Start: 2023-11-04 | End: 2023-11-06 | Stop reason: SDUPTHER

## 2023-11-04 RX ORDER — FUROSEMIDE 40 MG/1
40 TABLET ORAL DAILY
Status: DISCONTINUED | OUTPATIENT
Start: 2023-11-05 | End: 2023-11-08 | Stop reason: HOSPADM

## 2023-11-04 RX ORDER — DEXTROSE MONOHYDRATE 25 G/50ML
25 INJECTION, SOLUTION INTRAVENOUS
Status: DISCONTINUED | OUTPATIENT
Start: 2023-11-04 | End: 2023-11-06 | Stop reason: SDUPTHER

## 2023-11-04 RX ORDER — MIRTAZAPINE 15 MG/1
15 TABLET, FILM COATED ORAL NIGHTLY
Status: DISCONTINUED | OUTPATIENT
Start: 2023-11-04 | End: 2023-11-08 | Stop reason: HOSPADM

## 2023-11-04 RX ADMIN — TIZANIDINE 4 MG: 4 TABLET ORAL at 21:46

## 2023-11-04 RX ADMIN — ALPRAZOLAM 0.5 MG: 0.5 TABLET ORAL at 20:47

## 2023-11-04 RX ADMIN — GABAPENTIN 600 MG: 300 CAPSULE ORAL at 20:47

## 2023-11-04 RX ADMIN — MIRTAZAPINE 15 MG: 15 TABLET, FILM COATED ORAL at 20:47

## 2023-11-04 RX ADMIN — INSULIN LISPRO 7 UNITS: 100 INJECTION, SOLUTION INTRAVENOUS; SUBCUTANEOUS at 21:46

## 2023-11-04 RX ADMIN — INSULIN LISPRO 4 UNITS: 100 INJECTION, SOLUTION INTRAVENOUS; SUBCUTANEOUS at 18:21

## 2023-11-04 NOTE — Clinical Note
Level of Care: Med/Surg [1]   Admitting Physician: CHUCK VASQUEZ [[id number]]   Attending Physician: CHUCK VASQUEZ [[id number]]

## 2023-11-04 NOTE — H&P
[facility] Medicine Services  History & Physical    Patient Name: Nieves Mc  : 1975  MRN: [id number]  Primary Care Physician:  Provider, No Known  Date of admission: 2023      Subjective      Chief Complaint: rash    History of Present Illness: Nieves Mc is a 48 y.o. female who presented to [facility] on 2023 complaining of  Non-pain painful rash that has developed over her right hand and distal forearm, also started noticing it slightly on her left face and left forearm.  She reports that she woke up this morning and noticed the rash for the first time.  No other associated symptoms.  She has CML and is a patient of Dr. Lerma. She started on a new chemo medication 2 weeks ago called Sprycel.  She had an outpatient PRBC transfusion on 10/31/2023.        ROS Positive for rash.     Personal History     Past Medical History:   Diagnosis Date    Bone pain     Chronic constipation 2023    COVID-19 virus infection 2022    Diabetes mellitus     Diabetic gastroparesis 2023    Extremity pain     Carlin. legs pain    Fall during current hospitalization 2023    Leg pain     left leg greater    Migraine     Pubic ramus fracture, left, closed, initial encounter 2023    Pulmonary embolism     Vision loss     doing surgery       Past Surgical History:   Procedure Laterality Date    BONE MARROW BIOPSY      BREAST SURGERY      BRONCHOSCOPY N/A 2022    Procedure: BRONCHOSCOPY bilateral lung washing;  Surgeon: Charlene Camara MD;  Location: [facility] ENDOSCOPY;  Service: Pulmonary;  Laterality: N/A;  post: rule out infection vs transfusion lung injury     SECTION      CHOLECYSTECTOMY      COLONOSCOPY N/A 2023    Procedure: COLONOSCOPY;  Surgeon: Freddy Villeda MD;  Location: [facility] ENDOSCOPY;  Service: Gastroenterology;  Laterality: N/A;  poor prep    ENDOSCOPY N/A 2023    Procedure: ESOPHAGOGASTRODUODENOSCOPY with biopsy x2 areas;  Surgeon:  Freddy Villeda MD;  Location: [facility] ENDOSCOPY;  Service: Gastroenterology;  Laterality: N/A;  food in stomach;abnormal duodenal mucosa    EYE SURGERY      laser surgery due  to hemmorage--- 5/13/2021-- another surgery  lt eye11/15/21    RETINAL DETACHMENT SURGERY      SPINE SURGERY      Lombardi spinal block    TUBAL ABDOMINAL LIGATION         Family History: family history includes Diabetes in her maternal grandmother and mother; Heart attack in her maternal grandmother; Stroke in her maternal grandmother. Otherwise pertinent FHx was reviewed and not pertinent to current issue.    Social History:  reports that she has never smoked. She has never used smokeless tobacco. She reports that she does not drink alcohol and does not use drugs.    Home Medications:  Prior to Admission Medications       Prescriptions Last Dose Informant Patient Reported? Taking?    acetaminophen (TYLENOL) 325 MG tablet   Yes No    Take 2 tablets by mouth Every 4 (Four) Hours As Needed. Indications: Fever, Pain    ALPRAZolam (Xanax) 0.5 MG tablet   No No    Take 1 tablet by mouth At Night As Needed for Anxiety. Indications: Feeling Anxious    budesonide-formoterol (SYMBICORT) 160-4.5 MCG/ACT inhaler   No No    Inhale 2 puffs 2 (Two) Times a Day.    Patient not taking:  Reported on 10/5/2023    Continuous Blood Gluc Sensor (FreeStyle Zahira 2 Sensor) misc   Yes No    dapagliflozin Propanediol (Farxiga) 10 MG tablet   No No    Take 10 mg by mouth Daily.    Patient not taking:  Reported on 10/5/2023    dasatinib (SPRYCEL) 100 MG chemo tablet   No No    Take 1 tablet by mouth Daily.    Patient not taking:  Reported on 11/2/2023    dasatinib (SPRYCEL) 100 MG chemo tablet   Yes No    Take 100 mg by mouth Daily. Indications: Acute Lymphocytic Leukemia    furosemide (LASIX) 40 MG tablet   Yes No    Take 1 tablet by mouth Daily. Indications: Edema    Patient not taking:  Reported on 10/5/2023    gabapentin (NEURONTIN) 300 MG capsule   Yes No    Take  2 capsules by mouth 3 (Three) Times a Day. Indications: Diabetes with Nerve Disease    Insulin Glargine (BASAGLAR KWIKPEN) 100 UNIT/ML injection pen   No No    Inject 20 Units under the skin into the appropriate area as directed Daily.    Patient not taking:  Reported on 9/19/2023    midodrine (PROAMATINE) 10 MG tablet   No No    Take 1 tablet by mouth Every 8 (Eight) Hours.    mirtazapine (REMERON) 15 MG tablet   No No    Take 1 tablet by mouth every night at bedtime. Indications: Major Depressive Disorder    Nystatin (magic mouthwash)   No No    Swish and spit 5 mL 4 (Four) Times a Day. 164 cc Nystatin  140 cc Benadryl  96 cc Viscous Xylocaine    ondansetron ODT (ZOFRAN-ODT) 4 MG disintegrating tablet   No No    Place 1 tablet on the tongue Every 8 (Eight) Hours As Needed for Nausea or Vomiting. Indications: Nausea and Vomiting    pain patient supplied pump   Yes No    0.375 mL/hr by Intrathecal route Daily. Active pump  Prescriber: Dr. Shelton - pain management   Med name/ concentration: Dilaudid   Last refill: due soon  Lockout period: every 4 hours   Indications: chronic pain    prochlorperazine (COMPAZINE) 10 MG tablet   No No    Take 1 tablet by mouth Every 6 (Six) Hours As Needed for Nausea or Vomiting.    prochlorperazine (COMPAZINE) 25 MG suppository   No No    Insert 1 suppository into the rectum Every 12 (Twelve) Hours As Needed for Nausea or Vomiting. Use when unable to take oral compazine    promethazine (PHENERGAN) 12.5 MG tablet   No No    Take 1 tablet by mouth Every 6 (Six) Hours As Needed for Nausea or Vomiting.    tiZANidine (ZANAFLEX) 4 MG tablet   Yes No    Take 1 tablet by mouth Daily. Indications: Musculoskeletal Pain              Allergies:  No Known Allergies    Objective      Vitals:   Temp:  [97.9 °F (36.6 °C)] 97.9 °F (36.6 °C)  Heart Rate:  [105-106] 105  Resp:  [20] 20  BP: (140-157)/(78-87) 140/78    Physical Exam   Constitutional:  No acute distress.  Head:  Atraumatic.   Normocephalic.   Eyes:  No scleral icterus. Normal conjunctivae  ENT:  Moist mucosa.  No nasal discharge present.  Cardiovascular:  Well perfused.  Equal pulses.  Regular rate.  Normal capillary refill.    Pulmonary/Chest:  No respiratory distress.  Airway patent.  No tachypnea.  No accessory muscle usage.    Abdominal:  Nondistended. Nontender.   Extremities: Bilateral lower extremity peripheral edema.    Skin:  Warm, dry, nonblanchable rash over right upper extremity and lesser so for bilateral lower extremities and left face.  Nontender.  Patient also with petechiae over arms, face.  Neurological:  Alert, awake, and appropriate.  Normal speech.     Result Review    Result Review:  I have personally reviewed the results from the time of this admission to 11/4/2023 17:49 EDT and agree with these findings:  [x]  Laboratory  []  Microbiology  []  Radiology  []  EKG/Telemetry   []  Cardiology/Vascular   []  Pathology  []  Old records  []  Other:      Assessment & Plan        Active Hospital Problems:  Active Hospital Problems    Diagnosis     **Thrombocytopenia      Plan:     Petechial rash  Worsening thrombocytopenia  Type and cross  Transfuse for hemoglobin less than 7, platelets less than 20  ITP labs pending  Heme-onc consult placed  Restart home medications once verified.  Hold Sprycel until seen by heme-onc.    Glucose control.  Patient states that she has been out of her home diabetes medication.  We will add sliding scale insulin and Accu-Cheks.  Carb controlled diet  Monitor labs.  Monitor and replace electrolytes.      DVT prophylaxis:  Mechanical DVT prophylaxis orders are present.    CODE STATUS:    Code Status (Patient has no pulse and is not breathing): CPR (Attempt to Resuscitate)  Medical Interventions (Patient has pulse or is breathing): Full Support    Admission Status:  I believe this patient meets inpatient obs status.    I discussed the patient's findings and my recommendations with  patient.        Signature: Electronically signed by JP Purcell, 11/04/23, 17:49 EDT.  [facility] Hospitalist Team

## 2023-11-04 NOTE — ED NOTES
Nursing report ED to floor  Nieves Lawsonens  48 y.o.  female    HPI:   Chief Complaint   Patient presents with    Rash     Pt has rash to hand, feet and eye, pt is currently getting chemo for Leukemia, pt state she woke up, vomiting and noticed the rash.         Admitting doctor:   Shant Pineda MD    Admitting diagnosis:   The primary encounter diagnosis was Petechial rash. A diagnosis of Thrombocytopenia was also pertinent to this visit.    Code status:   Current Code Status       Date Active Code Status Order ID Comments User Context       11/4/2023 1749 CPR (Attempt to Resuscitate) [id number]  Tracey Ibrahim APRN ED        Question Answer    Code Status (Patient has no pulse and is not breathing) CPR (Attempt to Resuscitate)    Medical Interventions (Patient has pulse or is breathing) Full Support                    Allergies:   Patient has no known allergies.    Isolation:  No active isolations     Fall Risk:  Fall Risk Assessment was completed, and patient is at high risk for falls.   Predictive Model Details         5 (Low) Factor Value    Calculated 11/4/2023 18:00 Age 48    Risk of Fall Model Musculoskeletal Assessment WDL     Active Peripheral IV Present     Respiratory Rate 20     Skin Assessment WDL     Magnesium not on file     Drug Use No     Paul Scale not on file     Total Bilirubin 1.3 mg/dL     Peripheral Vascular Assessment WDL     Diastolic BP 78     Financial Class Medicaid     ALT 59 U/L     Gastrointestinal Assessment WDL     Chloride 102 mmol/L     Cardiac Assessment WDL     Albumin 4.1 g/dL     Potassium 5.1 mmol/L     Days after Admission 0.171     Calcium 10.8 mg/dL     Creatinine 0.69 mg/dL         Weight:       11/04/23  1351   Weight: 55.8 kg (123 lb)       Intake and Output  No intake or output data in the 24 hours ending 11/04/23 1801    Diet:   Dietary Orders (From admission, onward)       Start     Ordered    11/04/23 1748  Diet: Diabetic Diets; Consistent Carbohydrate; Texture:  "Regular Texture (IDDSI 7); Fluid Consistency: Thin (IDDSI 0)  Diet Effective Now        References:    Diet Order [place]   Question Answer Comment   Diets: Diabetic Diets    Diabetic Diet: Consistent Carbohydrate    Texture: Regular Texture (IDDSI 7)    Fluid Consistency: Thin (IDDSI 0)        11/04/23 1748                     Most recent vitals:   Vitals:    11/04/23 1351 11/04/23 1352 11/04/23 1445 11/04/23 1758   BP:  157/87 140/78    Pulse: 106  105 103   Resp: 20      Temp:  97.9 °F (36.6 °C)     TempSrc: Oral      SpO2: 97%  95% 93%   Weight: 55.8 kg (123 lb)      Height: 160 cm (63\")          Active LDAs/IV Access:   Lines, Drains & Airways       Active LDAs       Name Placement date Placement time Site Days    Peripheral IV 11/04/23 1526 Left;Upper;Anterior Arm 11/04/23  1526  Arm  less than 1    Pump Device 05/17/23  1930  --  170                    Skin Condition:   Skin Assessments (last day)       None             Labs (abnormal labs have a star):   Labs Reviewed   COMPREHENSIVE METABOLIC PANEL - Abnormal; Notable for the following components:       Result Value    Glucose 304 (*)     BUN 26 (*)     Calcium 10.8 (*)     ALT (SGPT) 59 (*)     AST (SGOT) 36 (*)     Alkaline Phosphatase 484 (*)     Total Bilirubin 1.3 (*)     BUN/Creatinine Ratio 37.7 (*)     All other components within normal limits    Narrative:     GFR Normal >60  Chronic Kidney Disease <60  Kidney Failure <15     SEDIMENTATION RATE - Abnormal; Notable for the following components:    Sed Rate 97 (*)     All other components within normal limits   CBC WITH AUTO DIFFERENTIAL - Abnormal; Notable for the following components:    RBC 3.02 (*)     Hemoglobin 8.8 (*)     Hematocrit 26.8 (*)     RDW 17.1 (*)     Platelets 50 (*)     All other components within normal limits   PROTIME-INR - Abnormal; Notable for the following components:    INR <0.93 (*)     All other components within normal limits   RETICULOCYTES - Abnormal; Notable for the " following components:    Reticulocyte % 1.98 (*)     All other components within normal limits   C-REACTIVE PROTEIN - Normal   RAINBOW DRAW    Narrative:     The following orders were created for panel order [place] Draw.  Procedure                               Abnormality         Status                     ---------                               -----------         ------                     Green Top (Gel)[[id number]]                                  Final result               Lavender Top[[id number]]                                                                Gold Top - SST[[id number]]                                   Final result               Light Blue Top[[id number]]                                   Final result                 Please view results for these tests on the individual orders.   APTT   HAPTOGLOBIN   MAGNESIUM   PHOSPHORUS   HEMOGLOBIN A1C   POCT GLUCOSE FINGERSTICK   POCT GLUCOSE FINGERSTICK   POCT GLUCOSE FINGERSTICK   POCT GLUCOSE FINGERSTICK   CBC AND DIFFERENTIAL    Narrative:     The following orders were created for panel order CBC & Differential.  Procedure                               Abnormality         Status                     ---------                               -----------         ------                     CBC Auto Differential[[id number]]        Abnormal            Final result               Scan Slide[[id number]]                                                                    Please view results for these tests on the individual orders.   GREEN TOP   GOLD TOP - SST   LIGHT BLUE TOP       LOC: Person, Place, Time, and Situation    Telemetry:  Med/Surg    Cardiac Monitoring Ordered: no    EKG:   No orders to display       Medications Given in the ED:   Medications   sodium chloride 0.9 % flush 10 mL (has no administration in time range)   acetaminophen (TYLENOL) tablet 650 mg (has no administration in time range)     Or   acetaminophen (TYLENOL) 160 MG/5ML oral solution 650  mg (has no administration in time range)     Or   acetaminophen (TYLENOL) suppository 650 mg (has no administration in time range)   aluminum-magnesium hydroxide-simethicone (MAALOX MAX) 400-400-40 MG/5ML suspension 15 mL (has no administration in time range)   sennosides-docusate (PERICOLACE) 8.6-50 MG per tablet 2 tablet (has no administration in time range)     And   polyethylene glycol (MIRALAX) packet 17 g (has no administration in time range)     And   bisacodyl (DULCOLAX) EC tablet 5 mg (has no administration in time range)     And   bisacodyl (DULCOLAX) suppository 10 mg (has no administration in time range)   ondansetron (ZOFRAN) tablet 4 mg (has no administration in time range)     Or   ondansetron (ZOFRAN) injection 4 mg (has no administration in time range)   melatonin tablet 5 mg (has no administration in time range)   Potassium Replacement - Follow Nurse / BPA Driven Protocol (has no administration in time range)   Magnesium Standard Dose Replacement - Follow Nurse / BPA Driven Protocol (has no administration in time range)   Phosphorus Replacement - Follow Nurse / BPA Driven Protocol (has no administration in time range)   Calcium Replacement - Follow Nurse / BPA Driven Protocol (has no administration in time range)   dextrose (GLUTOSE) oral gel 15 g (has no administration in time range)   dextrose (D50W) (25 g/50 mL) IV injection 25 g (has no administration in time range)   glucagon (GLUCAGEN) injection 1 mg (has no administration in time range)   insulin lispro (HUMALOG/ADMELOG) injection 2-9 Units (has no administration in time range)       Imaging results:  No radiology results for the last day    Social issues:   Social History     Socioeconomic History    Marital status: Legally    Tobacco Use    Smoking status: Never    Smokeless tobacco: Never   Vaping Use    Vaping Use: Never used   Substance and Sexual Activity    Alcohol use: No    Drug use: No    Sexual activity: Defer       NIH  Stroke Scale:  Interval: (not recorded)  1a. Level of Consciousness: (not recorded)  1b. LOC Questions: (not recorded)  1c. LOC Commands: (not recorded)  2. Best Gaze: (not recorded)  3. Visual: (not recorded)  4. Facial Palsy: (not recorded)  5a. Motor Arm, Left: (not recorded)  5b. Motor Arm, Right: (not recorded)  6a. Motor Leg, Left: (not recorded)  6b. Motor Leg, Right: (not recorded)  7. Limb Ataxia: (not recorded)  8. Sensory: (not recorded)  9. Best Language: (not recorded)  10. Dysarthria: (not recorded)  11. Extinction and Inattention (formerly Neglect): (not recorded)    Total (NIH Stroke Scale): (not recorded)     Additional notable assessment information:on active chemo for leukemia     Nursing report ED to floor:  CAMILA Malik RN   11/04/23 18:01 EDT

## 2023-11-04 NOTE — ED PROVIDER NOTES
Subjective   History of Present Illness  48-year-old female presents for rash.  Nonpainful.  Worse over right hand and distal forearm.  Also on feet as a she started noticing on her left face.  Had recent blood transfusion.  On Sprycel for CML.  States that is not painful.  No fevers.        Review of Systems  Positive for rash.  Past Medical History:   Diagnosis Date    Bone pain     Chronic constipation 2023    COVID-19 virus infection 2022    Diabetes mellitus     Diabetic gastroparesis 2023    Extremity pain     Carlin. legs pain    Fall during current hospitalization 2023    Leg pain     left leg greater    Migraine     Pubic ramus fracture, left, closed, initial encounter 2023    Pulmonary embolism     Vision loss     doing surgery       No Known Allergies    Past Surgical History:   Procedure Laterality Date    BONE MARROW BIOPSY      BREAST SURGERY      BRONCHOSCOPY N/A 2022    Procedure: BRONCHOSCOPY bilateral lung washing;  Surgeon: Charlene Camara MD;  Location: [facility] ENDOSCOPY;  Service: Pulmonary;  Laterality: N/A;  post: rule out infection vs transfusion lung injury     SECTION      CHOLECYSTECTOMY      COLONOSCOPY N/A 2023    Procedure: COLONOSCOPY;  Surgeon: Freddy Villeda MD;  Location: [facility] ENDOSCOPY;  Service: Gastroenterology;  Laterality: N/A;  poor prep    ENDOSCOPY N/A 2023    Procedure: ESOPHAGOGASTRODUODENOSCOPY with biopsy x2 areas;  Surgeon: Freddy Villeda MD;  Location: [facility] ENDOSCOPY;  Service: Gastroenterology;  Laterality: N/A;  food in stomach;abnormal duodenal mucosa    EYE SURGERY      laser surgery due  to hemmorage--- 2021-- another surgery  lt eye11/15/21    RETINAL DETACHMENT SURGERY      SPINE SURGERY      Lombardi spinal block    TUBAL ABDOMINAL LIGATION         Family History   Problem Relation Age of Onset    Diabetes Mother     Diabetes Maternal Grandmother     Heart attack Maternal Grandmother     Stroke Maternal  "Grandmother        Social History     Socioeconomic History    Marital status: Legally    Tobacco Use    Smoking status: Never    Smokeless tobacco: Never   Vaping Use    Vaping Use: Never used   Substance and Sexual Activity    Alcohol use: No    Drug use: No    Sexual activity: Defer           Objective   Physical Exam  Constitutional:  No acute distress.  Head:  Atraumatic.  Normocephalic.   Eyes:  No scleral icterus. Normal conjunctivae  ENT:  Moist mucosa.  No nasal discharge present.  Cardiovascular:  Well perfused.  Equal pulses.  Regular rate.  Normal capillary refill.    Pulmonary/Chest:  No respiratory distress.  Airway patent.  No tachypnea.  No accessory muscle usage.    Abdominal:  Nondistended. Nontender.   Extremities: Bilateral lower extremity peripheral edema.    Skin:  Warm, dry, nonblanchable rash over right upper extremity and lesser so for bilateral lower extremities and left face.  Nontender.  Patient also with petechiae over arms, face.  Neurological:  Alert, awake, and appropriate.  Normal speech.      Procedures           ED Course      /78   Pulse 105   Temp 97.9 °F (36.6 °C)   Resp 20   Ht 160 cm (63\")   Wt 55.8 kg (123 lb)   LMP 05/25/2022 (Approximate)   SpO2 95%   BMI 21.79 kg/m²   Labs Reviewed   COMPREHENSIVE METABOLIC PANEL - Abnormal; Notable for the following components:       Result Value    Glucose 304 (*)     BUN 26 (*)     Calcium 10.8 (*)     ALT (SGPT) 59 (*)     AST (SGOT) 36 (*)     Alkaline Phosphatase 484 (*)     Total Bilirubin 1.3 (*)     BUN/Creatinine Ratio 37.7 (*)     All other components within normal limits    Narrative:     GFR Normal >60  Chronic Kidney Disease <60  Kidney Failure <15     SEDIMENTATION RATE - Abnormal; Notable for the following components:    Sed Rate 97 (*)     All other components within normal limits   CBC WITH AUTO DIFFERENTIAL - Abnormal; Notable for the following components:    RBC 3.02 (*)     Hemoglobin 8.8 (*)     " Hematocrit 26.8 (*)     RDW 17.1 (*)     Platelets 50 (*)     All other components within normal limits   RETICULOCYTES - Abnormal; Notable for the following components:    Reticulocyte % 1.98 (*)     All other components within normal limits   C-REACTIVE PROTEIN - Normal   RAINBOW DRAW    Narrative:     The following orders were created for panel order [place] Draw.  Procedure                               Abnormality         Status                     ---------                               -----------         ------                     Green Top (Gel)[[id number]]                                  Final result               Lavender Top[[id number]]                                                                Gold Top - SST[[id number]]                                   Final result               Light Blue Top[[id number]]                                   Final result                 Please view results for these tests on the individual orders.   PROTIME-INR   APTT   HAPTOGLOBIN   CBC AND DIFFERENTIAL    Narrative:     The following orders were created for panel order CBC & Differential.  Procedure                               Abnormality         Status                     ---------                               -----------         ------                     CBC Auto Differential[[id number]]        Abnormal            Final result               Scan Slide[[id number]]                                                                    Please view results for these tests on the individual orders.   GREEN TOP   GOLD TOP - SST   LIGHT BLUE TOP     Medications   sodium chloride 0.9 % flush 10 mL (has no administration in time range)     No radiology results for the last day                                       Medical Decision Making  Problems Addressed:  Petechial rash: complicated acute illness or injury  Thrombocytopenia: complicated acute illness or injury    Amount and/or Complexity of Data Reviewed  Labs:  ordered.    Risk  Prescription drug management.  Decision regarding hospitalization.    Patient with no petechial rash with worsening thrombocytopenia.  Had blood transfusion 2 days ago.  Pending additional labs.  Is nonpainful.  No intraoral lesions.  Lower suspicion for Estrada-James's given it seems to be sparing palms and soles, more likely petechial, no sloughing and nonpainful with no oral lesions.  Unclear etiology.  Paged Dr. Asad QUINONES pending callback.  Will admit to hospitalist service.    Final diagnoses:   Petechial rash   Thrombocytopenia       ED Disposition  ED Disposition       ED Disposition   Decision to Admit    Condition   --    Comment   Level of Care: Med/Surg [1]   Admitting Physician: CHUCK VASQUEZ [[id number]]   Attending Physician: CHUCK VASQUEZ [[id number]]                 No follow-up provider specified.       Medication List      No changes were made to your prescriptions during this visit.            Braden Trejo MD  11/04/23 2003

## 2023-11-05 LAB
ABO GROUP BLD: NORMAL
ANION GAP SERPL CALCULATED.3IONS-SCNC: 12 MMOL/L (ref 5–15)
BASOPHILS # BLD AUTO: 0 10*3/MM3 (ref 0–0.2)
BASOPHILS NFR BLD AUTO: 0.9 % (ref 0–1.5)
BLD GP AB SCN SERPL QL: POSITIVE
BUN SERPL-MCNC: 19 MG/DL (ref 6–20)
BUN/CREAT SERPL: 30.2 (ref 7–25)
CALCIUM SPEC-SCNC: 10.5 MG/DL (ref 8.6–10.5)
CHLORIDE SERPL-SCNC: 101 MMOL/L (ref 98–107)
CO2 SERPL-SCNC: 24 MMOL/L (ref 22–29)
CREAT SERPL-MCNC: 0.63 MG/DL (ref 0.57–1)
DAT POLY-SP REAG RBC QL: NEGATIVE
DEPRECATED RDW RBC AUTO: 53.4 FL (ref 37–54)
EGFRCR SERPLBLD CKD-EPI 2021: 109.6 ML/MIN/1.73
EOSINOPHIL # BLD AUTO: 0.1 10*3/MM3 (ref 0–0.4)
EOSINOPHIL NFR BLD AUTO: 1.7 % (ref 0.3–6.2)
ERYTHROCYTE [DISTWIDTH] IN BLOOD BY AUTOMATED COUNT: 16.8 % (ref 12.3–15.4)
GLUCOSE BLDC GLUCOMTR-MCNC: 113 MG/DL (ref 70–105)
GLUCOSE BLDC GLUCOMTR-MCNC: 215 MG/DL (ref 70–105)
GLUCOSE BLDC GLUCOMTR-MCNC: 264 MG/DL (ref 70–105)
GLUCOSE BLDC GLUCOMTR-MCNC: 373 MG/DL (ref 70–105)
GLUCOSE SERPL-MCNC: 409 MG/DL (ref 65–99)
HAPTOGLOB SERPL-MCNC: 179 MG/DL (ref 30–200)
HCT VFR BLD AUTO: 23.6 % (ref 34–46.6)
HGB BLD-MCNC: 7.7 G/DL (ref 12–15.9)
LYMPHOCYTES # BLD AUTO: 1.3 10*3/MM3 (ref 0.7–3.1)
LYMPHOCYTES NFR BLD AUTO: 33.1 % (ref 19.6–45.3)
MCH RBC QN AUTO: 29.3 PG (ref 26.6–33)
MCHC RBC AUTO-ENTMCNC: 32.6 G/DL (ref 31.5–35.7)
MCV RBC AUTO: 89.8 FL (ref 79–97)
MONOCYTES # BLD AUTO: 0.5 10*3/MM3 (ref 0.1–0.9)
MONOCYTES NFR BLD AUTO: 11.8 % (ref 5–12)
NEUTROPHILS NFR BLD AUTO: 2.1 10*3/MM3 (ref 1.7–7)
NEUTROPHILS NFR BLD AUTO: 52.5 % (ref 42.7–76)
NONSPECIFIC ANTIBODY: NORMAL
NRBC BLD AUTO-RTO: 0 /100 WBC (ref 0–0.2)
PLATELET # BLD AUTO: 43 10*3/MM3 (ref 140–450)
PMV BLD AUTO: 7.6 FL (ref 6–12)
POTASSIUM SERPL-SCNC: 4.4 MMOL/L (ref 3.5–5.2)
RBC # BLD AUTO: 2.63 10*6/MM3 (ref 3.77–5.28)
RH BLD: POSITIVE
SODIUM SERPL-SCNC: 137 MMOL/L (ref 136–145)
T&S EXPIRATION DATE: NORMAL
WBC NRBC COR # BLD: 4 10*3/MM3 (ref 3.4–10.8)

## 2023-11-05 PROCEDURE — 82948 REAGENT STRIP/BLOOD GLUCOSE: CPT

## 2023-11-05 PROCEDURE — 86850 RBC ANTIBODY SCREEN: CPT | Performed by: INTERNAL MEDICINE

## 2023-11-05 PROCEDURE — 86901 BLOOD TYPING SEROLOGIC RH(D): CPT | Performed by: INTERNAL MEDICINE

## 2023-11-05 PROCEDURE — G0378 HOSPITAL OBSERVATION PER HR: HCPCS

## 2023-11-05 PROCEDURE — 86880 COOMBS TEST DIRECT: CPT | Performed by: INTERNAL MEDICINE

## 2023-11-05 PROCEDURE — 86902 BLOOD TYPE ANTIGEN DONOR EA: CPT

## 2023-11-05 PROCEDURE — 63710000001 INSULIN GLARGINE PER 5 UNITS: Performed by: INTERNAL MEDICINE

## 2023-11-05 PROCEDURE — 63710000001 INSULIN LISPRO (HUMAN) PER 5 UNITS: Performed by: INTERNAL MEDICINE

## 2023-11-05 PROCEDURE — 85025 COMPLETE CBC W/AUTO DIFF WBC: CPT | Performed by: INTERNAL MEDICINE

## 2023-11-05 PROCEDURE — 86922 COMPATIBILITY TEST ANTIGLOB: CPT

## 2023-11-05 PROCEDURE — 99214 OFFICE O/P EST MOD 30 MIN: CPT | Performed by: INTERNAL MEDICINE

## 2023-11-05 PROCEDURE — 80048 BASIC METABOLIC PNL TOTAL CA: CPT | Performed by: INTERNAL MEDICINE

## 2023-11-05 PROCEDURE — 86870 RBC ANTIBODY IDENTIFICATION: CPT | Performed by: INTERNAL MEDICINE

## 2023-11-05 PROCEDURE — 86900 BLOOD TYPING SEROLOGIC ABO: CPT | Performed by: INTERNAL MEDICINE

## 2023-11-05 PROCEDURE — 63710000001 INSULIN LISPRO (HUMAN) PER 5 UNITS: Performed by: NURSE PRACTITIONER

## 2023-11-05 RX ORDER — INSULIN LISPRO 100 [IU]/ML
5 INJECTION, SOLUTION INTRAVENOUS; SUBCUTANEOUS
Status: DISCONTINUED | OUTPATIENT
Start: 2023-11-05 | End: 2023-11-06

## 2023-11-05 RX ORDER — BUDESONIDE AND FORMOTEROL FUMARATE DIHYDRATE 160; 4.5 UG/1; UG/1
2 AEROSOL RESPIRATORY (INHALATION)
Status: DISCONTINUED | OUTPATIENT
Start: 2023-11-05 | End: 2023-11-08 | Stop reason: HOSPADM

## 2023-11-05 RX ADMIN — MIRTAZAPINE 15 MG: 15 TABLET, FILM COATED ORAL at 20:49

## 2023-11-05 RX ADMIN — FUROSEMIDE 40 MG: 40 TABLET ORAL at 09:02

## 2023-11-05 RX ADMIN — INSULIN GLARGINE 10 UNITS: 100 INJECTION, SOLUTION SUBCUTANEOUS at 21:10

## 2023-11-05 RX ADMIN — GABAPENTIN 600 MG: 300 CAPSULE ORAL at 18:38

## 2023-11-05 RX ADMIN — INSULIN LISPRO 6 UNITS: 100 INJECTION, SOLUTION INTRAVENOUS; SUBCUTANEOUS at 21:09

## 2023-11-05 RX ADMIN — GABAPENTIN 600 MG: 300 CAPSULE ORAL at 09:02

## 2023-11-05 RX ADMIN — INSULIN LISPRO 5 UNITS: 100 INJECTION, SOLUTION INTRAVENOUS; SUBCUTANEOUS at 09:02

## 2023-11-05 RX ADMIN — INSULIN GLARGINE 10 UNITS: 100 INJECTION, SOLUTION SUBCUTANEOUS at 09:02

## 2023-11-05 RX ADMIN — INSULIN LISPRO 4 UNITS: 100 INJECTION, SOLUTION INTRAVENOUS; SUBCUTANEOUS at 18:38

## 2023-11-05 RX ADMIN — TIZANIDINE 4 MG: 4 TABLET ORAL at 20:49

## 2023-11-05 RX ADMIN — INSULIN LISPRO 5 UNITS: 100 INJECTION, SOLUTION INTRAVENOUS; SUBCUTANEOUS at 18:39

## 2023-11-05 RX ADMIN — GABAPENTIN 600 MG: 300 CAPSULE ORAL at 20:49

## 2023-11-05 RX ADMIN — Medication 10 ML: at 20:50

## 2023-11-05 RX ADMIN — INSULIN LISPRO 8 UNITS: 100 INJECTION, SOLUTION INTRAVENOUS; SUBCUTANEOUS at 09:02

## 2023-11-05 RX ADMIN — ALPRAZOLAM 0.5 MG: 0.5 TABLET ORAL at 20:58

## 2023-11-05 NOTE — CASE MANAGEMENT/SOCIAL WORK
Continued Stay Note  ZOHAIB Amor     Patient Name: Nieves Mc  MRN: [id number]  Today's Date: 11/5/2023    Admit Date: 11/4/2023        Discharge Plan       Row Name 11/05/23 1559       Plan    Plan Comments verified with central intake that pt is current with [facility].  Monitor cbc, oncology following.                      Expected Discharge Date and Time       Expected Discharge Date Expected Discharge Time    Nov 6, 2023               Yumiko Mendes RN

## 2023-11-05 NOTE — PLAN OF CARE
Problem: Adult Inpatient Plan of Care  Goal: Plan of Care Review  Outcome: Ongoing, Progressing  Flowsheets (Taken 11/5/2023 0409)  Progress: no change  Plan of Care Reviewed With: patient  Goal: Patient-Specific Goal (Individualized)  Outcome: Ongoing, Progressing  Goal: Absence of Hospital-Acquired Illness or Injury  Outcome: Ongoing, Progressing  Intervention: Identify and Manage Fall Risk  Recent Flowsheet Documentation  Taken 11/5/2023 0133 by Chandler Peters RN  Safety Promotion/Fall Prevention: safety round/check completed  Taken 11/5/2023 0000 by Chandler Peters RN  Safety Promotion/Fall Prevention: safety round/check completed  Taken 11/4/2023 2200 by Chandler Peters RN  Safety Promotion/Fall Prevention: safety round/check completed  Taken 11/4/2023 1954 by Chandler Peters RN  Safety Promotion/Fall Prevention:   safety round/check completed   room organization consistent   nonskid shoes/slippers when out of bed   assistive device/personal items within reach   fall prevention program maintained  Goal: Optimal Comfort and Wellbeing  Outcome: Ongoing, Progressing  Intervention: Provide Person-Centered Care  Recent Flowsheet Documentation  Taken 11/4/2023 1954 by Chandler Peters RN  Trust Relationship/Rapport:   care explained   choices provided  Goal: Readiness for Transition of Care  Outcome: Ongoing, Progressing  Intervention: Mutually Develop Transition Plan  Recent Flowsheet Documentation  Taken 11/4/2023 1905 by Chandler Peters RN  Transportation Anticipated:   family or friend will provide   health plan transportation  Patient/Family Anticipated Services at Transition: home health care  Patient/Family Anticipates Transition to:   home with family   home with help/services  Taken 11/4/2023 1858 by Chandler Peters RN  Equipment Currently Used at Home:   wheelchair   walker, rolling     Problem: Skin Injury Risk Increased  Goal: Skin Health and Integrity  Outcome: Ongoing, Progressing   Goal Outcome  Evaluation:  Plan of Care Reviewed With: patient        Progress: no change     Multiple missed phlebotomy attempts, awaiting on laboratory to attempt. Pt states that she feel likes rash on arms is getting worse.
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
## 2023-11-05 NOTE — DISCHARGE PLACEMENT REQUEST
"J Luis Kowalski (48 y.o. Female)       Date of Birth   1975    Social Security Number       Address   [address] [place] [address]    Home Phone   [phone]    MRN   [id number]       Sikhism   None    Marital Status   Legally                             Admission Date   11/4/23    Admission Type   Emergency    Admitting Provider   Shant Pineda MD    Attending Provider   Bruno Payne MD    Department, Room/Bed   [facility] 3A MEDICAL INPATIENT, 309/1       Discharge Date       Discharge Disposition       Discharge Destination                                 Attending Provider: Bruno Payne MD    Allergies: No Known Allergies    Isolation: None   Infection: ESBL E coli (02/15/23)   Code Status: CPR    Ht: 162.6 cm (64\")   Wt: 64.7 kg (142 lb 10.2 oz)    Admission Cmt: None   Principal Problem: Thrombocytopenia [D69.6]                   Active Insurance as of 11/4/2023       Primary Coverage       Payor Plan Insurance Group Employer/Plan Group    [place]        Payor Plan Address Payor Plan Phone Number Payor Plan Fax Number Effective Dates    [address]   6/1/2023 - None Entered    [place] [address]         Subscriber Name Subscriber Birth Date Member ID       J LUIS KOWALSKI 1975 [id number]               Secondary Coverage       Payor Plan Insurance Group Employer/Plan Group    ANTHEM MEDICARE REPLACEMENT ANTHEM MEDICARE ADVANTAGE [id number]       Payor Plan Address Payor Plan Phone Number Payor Plan Fax Number Effective Dates    [address] [phone]  1/1/2023 - None Entered    [facility] [address]         Subscriber Name Subscriber Birth Date Member ID       J LUIS KOWALSKI 1975 [id number]                     Emergency Contacts        (Rel.) Home Phone Work Phone Mobile Phone    Sayra Cabezas (Mother) [phone] -- --    JesusitaPrimitivo (Son) -- -- [phone]                 History & Physical    "     Tracey Ibrahim APRN at 23 0273       Attestation signed by Shant Pineda MD at 23 1532    I have reviewed this documentation and agree.                      [facility] Medicine Services  History & Physical    Patient Name: Nieves Mc  : 1975  MRN: [id number]  Primary Care Physician:  Provider, No Known  Date of admission: 2023      Subjective      Chief Complaint: rash    History of Present Illness: Nieves Mc is a 48 y.o. female who presented to [facility] on 2023 complaining of  Non-pain painful rash that has developed over her right hand and distal forearm, also started noticing it slightly on her left face and left forearm.  She reports that she woke up this morning and noticed the rash for the first time.  No other associated symptoms.  She has CML and is a patient of Dr. Lerma. She started on a new chemo medication 2 weeks ago called Sprycel.  She had an outpatient PRBC transfusion on 10/31/2023.        ROS Positive for rash.     Personal History     Past Medical History:   Diagnosis Date    Bone pain     Chronic constipation 2023    COVID-19 virus infection 2022    Diabetes mellitus     Diabetic gastroparesis 2023    Extremity pain     Carlin. legs pain    Fall during current hospitalization 2023    Leg pain     left leg greater    Migraine     Pubic ramus fracture, left, closed, initial encounter 2023    Pulmonary embolism     Vision loss     doing surgery       Past Surgical History:   Procedure Laterality Date    BONE MARROW BIOPSY      BREAST SURGERY      BRONCHOSCOPY N/A 2022    Procedure: BRONCHOSCOPY bilateral lung washing;  Surgeon: Charlene Camara MD;  Location: [facility] ENDOSCOPY;  Service: Pulmonary;  Laterality: N/A;  post: rule out infection vs transfusion lung injury     SECTION      CHOLECYSTECTOMY      COLONOSCOPY N/A 2023    Procedure: COLONOSCOPY;  Surgeon: Freddy Villeda MD;  Location:   [facility];  Service: Gastroenterology;  Laterality: N/A;  poor prep    ENDOSCOPY N/A 6/29/2023    Procedure: ESOPHAGOGASTRODUODENOSCOPY with biopsy x2 areas;  Surgeon: Freddy Villeda MD;  Location: [facility] ENDOSCOPY;  Service: Gastroenterology;  Laterality: N/A;  food in stomach;abnormal duodenal mucosa    EYE SURGERY      laser surgery due  to hemmorage--- 5/13/2021-- another surgery  lt eye11/15/21    RETINAL DETACHMENT SURGERY      SPINE SURGERY      Lombardi spinal block    TUBAL ABDOMINAL LIGATION         Family History: family history includes Diabetes in her maternal grandmother and mother; Heart attack in her maternal grandmother; Stroke in her maternal grandmother. Otherwise pertinent FHx was reviewed and not pertinent to current issue.    Social History:  reports that she has never smoked. She has never used smokeless tobacco. She reports that she does not drink alcohol and does not use drugs.    Home Medications:  Prior to Admission Medications       Prescriptions Last Dose Informant Patient Reported? Taking?    acetaminophen (TYLENOL) 325 MG tablet   Yes No    Take 2 tablets by mouth Every 4 (Four) Hours As Needed. Indications: Fever, Pain    ALPRAZolam (Xanax) 0.5 MG tablet   No No    Take 1 tablet by mouth At Night As Needed for Anxiety. Indications: Feeling Anxious    budesonide-formoterol (SYMBICORT) 160-4.5 MCG/ACT inhaler   No No    Inhale 2 puffs 2 (Two) Times a Day.    Patient not taking:  Reported on 10/5/2023    Continuous Blood Gluc Sensor (FreeStyle Zahira 2 Sensor) misc   Yes No    dapagliflozin Propanediol (Farxiga) 10 MG tablet   No No    Take 10 mg by mouth Daily.    Patient not taking:  Reported on 10/5/2023    dasatinib (SPRYCEL) 100 MG chemo tablet   No No    Take 1 tablet by mouth Daily.    Patient not taking:  Reported on 11/2/2023    dasatinib (SPRYCEL) 100 MG chemo tablet   Yes No    Take 100 mg by mouth Daily. Indications: Acute Lymphocytic Leukemia    furosemide (LASIX) 40 MG  tablet   Yes No    Take 1 tablet by mouth Daily. Indications: Edema    Patient not taking:  Reported on 10/5/2023    gabapentin (NEURONTIN) 300 MG capsule   Yes No    Take 2 capsules by mouth 3 (Three) Times a Day. Indications: Diabetes with Nerve Disease    Insulin Glargine (BASAGLAR KWIKPEN) 100 UNIT/ML injection pen   No No    Inject 20 Units under the skin into the appropriate area as directed Daily.    Patient not taking:  Reported on 9/19/2023    midodrine (PROAMATINE) 10 MG tablet   No No    Take 1 tablet by mouth Every 8 (Eight) Hours.    mirtazapine (REMERON) 15 MG tablet   No No    Take 1 tablet by mouth every night at bedtime. Indications: Major Depressive Disorder    Nystatin (magic mouthwash)   No No    Swish and spit 5 mL 4 (Four) Times a Day. 164 cc Nystatin  140 cc Benadryl  96 cc Viscous Xylocaine    ondansetron ODT (ZOFRAN-ODT) 4 MG disintegrating tablet   No No    Place 1 tablet on the tongue Every 8 (Eight) Hours As Needed for Nausea or Vomiting. Indications: Nausea and Vomiting    pain patient supplied pump   Yes No    0.375 mL/hr by Intrathecal route Daily. Active pump  Prescriber: Dr. Shelton - pain management   Med name/ concentration: Dilaudid   Last refill: due soon  Lockout period: every 4 hours   Indications: chronic pain    prochlorperazine (COMPAZINE) 10 MG tablet   No No    Take 1 tablet by mouth Every 6 (Six) Hours As Needed for Nausea or Vomiting.    prochlorperazine (COMPAZINE) 25 MG suppository   No No    Insert 1 suppository into the rectum Every 12 (Twelve) Hours As Needed for Nausea or Vomiting. Use when unable to take oral compazine    promethazine (PHENERGAN) 12.5 MG tablet   No No    Take 1 tablet by mouth Every 6 (Six) Hours As Needed for Nausea or Vomiting.    tiZANidine (ZANAFLEX) 4 MG tablet   Yes No    Take 1 tablet by mouth Daily. Indications: Musculoskeletal Pain              Allergies:  No Known Allergies    Objective      Vitals:   Temp:  [97.9 °F (36.6 °C)] 97.9 °F  (36.6 °C)  Heart Rate:  [105-106] 105  Resp:  [20] 20  BP: (140-157)/(78-87) 140/78    Physical Exam   Constitutional:  No acute distress.  Head:  Atraumatic.  Normocephalic.   Eyes:  No scleral icterus. Normal conjunctivae  ENT:  Moist mucosa.  No nasal discharge present.  Cardiovascular:  Well perfused.  Equal pulses.  Regular rate.  Normal capillary refill.    Pulmonary/Chest:  No respiratory distress.  Airway patent.  No tachypnea.  No accessory muscle usage.    Abdominal:  Nondistended. Nontender.   Extremities: Bilateral lower extremity peripheral edema.    Skin:  Warm, dry, nonblanchable rash over right upper extremity and lesser so for bilateral lower extremities and left face.  Nontender.  Patient also with petechiae over arms, face.  Neurological:  Alert, awake, and appropriate.  Normal speech.     Result Review    Result Review:  I have personally reviewed the results from the time of this admission to 11/4/2023 17:49 EDT and agree with these findings:  [x]  Laboratory  []  Microbiology  []  Radiology  []  EKG/Telemetry   []  Cardiology/Vascular   []  Pathology  []  Old records  []  Other:      Assessment & Plan        Active Hospital Problems:  Active Hospital Problems    Diagnosis     **Thrombocytopenia      Plan:     Petechial rash  Worsening thrombocytopenia  Type and cross  Transfuse for hemoglobin less than 7, platelets less than 20  ITP labs pending  Heme-onc consult placed  Restart home medications once verified.  Hold Sprycel until seen by heme-onc.    Glucose control.  Patient states that she has been out of her home diabetes medication.  We will add sliding scale insulin and Accu-Cheks.  Carb controlled diet  Monitor labs.  Monitor and replace electrolytes.      DVT prophylaxis:  Mechanical DVT prophylaxis orders are present.    CODE STATUS:    Code Status (Patient has no pulse and is not breathing): CPR (Attempt to Resuscitate)  Medical Interventions (Patient has pulse or is breathing): Full  Support    Admission Status:  I believe this patient meets inpatient obs status.    I discussed the patient's findings and my recommendations with patient.        Signature: Electronically signed by JP Purcell, 11/04/23, 17:49 EDT.  [facility]    Electronically signed by Shant Pineda MD at 11/05/23 0617

## 2023-11-05 NOTE — PROGRESS NOTES
Hospitalist Progress Note   Nieves Mc : 1975 MRN:[id number] LOS:0     Principal Problem: Thrombocytopenia     Reason for follow up: All the medical problems listed below    Summary     Nieves Mc is a 48 y.o. female who presented to [facility] on 2023 complaining of Non-pain painful rash that has developed over her right hand and distal forearm, also started noticing it slightly on her left face and left forearm.  She reports that she woke up this morning and noticed the rash for the first time.  No other associated symptoms.  She has CML and is a patient of Dr. Lerma. She started on a new chemo medication 2 weeks ago called Sprycel.  She had an outpatient PRBC transfusion on 10/31/2023.      Assessment / Plan     #Petechial rash 2/2 Worsening thrombocytopenia from ? ITP or chemo side effect   -presented with skin rash on bilateral UE ( R > L ) and also started on thighs   -recent bld transfusion on 10/31/23 for anemia  -chart review with thrombocytopenia in May 2023.   -Transfuse for hemoglobin less than 7, platelets less than 20  -ITP labs pending  -Heme-onc consult placed   -Restart home medications once verified.  Hold Sprycel until seen by heme-onc.      #CML   -has been on chemo for 5 years and recent change to Sprycel. And she has abdo pain more.   -monitor CBC daily     #DM   -SSI glargine edd lispro     Disposition: home     Subjective / Review of systems     Review of Systems   Feeling worry     Objective / Physical Exam   Vital signs:  Temp: 97.9 °F (36.6 °C)  BP: 140/88  Heart Rate: 116  Resp: 12  SpO2: 98 %  Weight: 64.7 kg (142 lb 10.2 oz)    Admission Weight: Weight: 55.8 kg (123 lb)  Current Weight: Weight: 64.7 kg (142 lb 10.2 oz)    Input/Output in last 24 hours:  No intake or output data in the 24 hours ending 23 1044   Physical Exam   Physical Exam  HENT:      Head: Normocephalic and atraumatic.      Nose: Nose normal.   Eyes:      Extraocular Movements:  Extraocular movements intact.      Conjunctivae/sclera: Conjunctivae normal.      Pupils: Pupils are equal, round, and reactive to light.   Cardiovascular:      Rate and Rhythm: normal       Pulses: Normal pulses.      Heart sounds: Normal heart sounds.   Pulmonary:      Effort: normal      Breath sounds: normal   Abdominal:      General: Abdomen is flat. Bowel sounds are normal.      Palpations: Abdomen is soft.   Musculoskeletal:         General: Normal range of motion.      Cervical back: Normal range of motion and neck supple.   Skin:     Petechia rash in both UE and thigh +        Radiology and Labs     Results from last 7 days   Lab Units 11/05/23  0938 11/04/23  1517 10/31/23  2204 10/30/23  0920   WBC 10*3/mm3 4.00 6.10  --  7.00   HEMATOCRIT % 23.6* 26.8* 25.4* 21.0*   PLATELETS 10*3/mm3 43* 50*  --  87*      Results from last 7 days   Lab Units 11/05/23  0938 11/04/23  1517 10/30/23  0920   SODIUM mmol/L 137 136 136   POTASSIUM mmol/L 4.4 5.1 4.9   CHLORIDE mmol/L 101 102 101   CO2 mmol/L 24.0 23.0 26.0   BUN mg/dL 19 26* 30*   CREATININE mg/dL 0.63 0.69 0.80      Current medications   Scheduled Meds: furosemide, 40 mg, Oral, Daily  gabapentin, 600 mg, Oral, TID  insulin glargine, 10 Units, Subcutaneous, Q12H  insulin lispro, 2-9 Units, Subcutaneous, 4x Daily AC & at Bedtime  insulin lispro, 5 Units, Subcutaneous, TID With Meals  mirtazapine, 15 mg, Oral, Nightly  senna-docusate sodium, 2 tablet, Oral, BID  tiZANidine, 4 mg, Oral, Daily      Continuous Infusions:      Reviewed all other data in the last 24 hours, including but not limited to vitals, labs, microbiology, imaging and pertinent notes from other providers.     Bruno Payne MD   [facility] Medicine  11/05/23   10:44 EST

## 2023-11-06 ENCOUNTER — DOCUMENTATION (OUTPATIENT)
Dept: HOME HEALTH SERVICES | Facility: HOME HEALTHCARE | Age: 48
End: 2023-11-06
Payer: MEDICAID

## 2023-11-06 LAB
ALBUMIN SERPL-MCNC: 3.8 G/DL (ref 3.5–5.2)
ALBUMIN/GLOB SERPL: 1.2 G/DL
ALP SERPL-CCNC: 441 U/L (ref 39–117)
ALT SERPL W P-5'-P-CCNC: 59 U/L (ref 1–33)
ANION GAP SERPL CALCULATED.3IONS-SCNC: 11 MMOL/L (ref 5–15)
AST SERPL-CCNC: 41 U/L (ref 1–32)
BASOPHILS # BLD AUTO: 0.1 10*3/MM3 (ref 0–0.2)
BASOPHILS NFR BLD AUTO: 1.1 % (ref 0–1.5)
BILIRUB SERPL-MCNC: 1.1 MG/DL (ref 0–1.2)
BUN SERPL-MCNC: 25 MG/DL (ref 6–20)
BUN/CREAT SERPL: 38.5 (ref 7–25)
CALCIUM SPEC-SCNC: 11.1 MG/DL (ref 8.6–10.5)
CHLORIDE SERPL-SCNC: 99 MMOL/L (ref 98–107)
CO2 SERPL-SCNC: 26 MMOL/L (ref 22–29)
CREAT SERPL-MCNC: 0.65 MG/DL (ref 0.57–1)
DEPRECATED RDW RBC AUTO: 50.8 FL (ref 37–54)
EGFRCR SERPLBLD CKD-EPI 2021: 108.8 ML/MIN/1.73
EOSINOPHIL # BLD AUTO: 0.1 10*3/MM3 (ref 0–0.4)
EOSINOPHIL NFR BLD AUTO: 1.4 % (ref 0.3–6.2)
ERYTHROCYTE [DISTWIDTH] IN BLOOD BY AUTOMATED COUNT: 16.4 % (ref 12.3–15.4)
GLOBULIN UR ELPH-MCNC: 3.3 GM/DL
GLUCOSE BLDC GLUCOMTR-MCNC: 147 MG/DL (ref 70–105)
GLUCOSE BLDC GLUCOMTR-MCNC: 158 MG/DL (ref 70–105)
GLUCOSE BLDC GLUCOMTR-MCNC: 354 MG/DL (ref 70–105)
GLUCOSE BLDC GLUCOMTR-MCNC: 501 MG/DL (ref 70–105)
GLUCOSE BLDC GLUCOMTR-MCNC: 538 MG/DL (ref 70–105)
GLUCOSE SERPL-MCNC: 131 MG/DL (ref 65–99)
HCT VFR BLD AUTO: 24.3 % (ref 34–46.6)
HGB BLD-MCNC: 8 G/DL (ref 12–15.9)
LYMPHOCYTES # BLD AUTO: 1.9 10*3/MM3 (ref 0.7–3.1)
LYMPHOCYTES NFR BLD AUTO: 39 % (ref 19.6–45.3)
MAGNESIUM SERPL-MCNC: 1.5 MG/DL (ref 1.6–2.6)
MCH RBC QN AUTO: 29.1 PG (ref 26.6–33)
MCHC RBC AUTO-ENTMCNC: 32.9 G/DL (ref 31.5–35.7)
MCV RBC AUTO: 88.4 FL (ref 79–97)
MONOCYTES # BLD AUTO: 0.8 10*3/MM3 (ref 0.1–0.9)
MONOCYTES NFR BLD AUTO: 16.2 % (ref 5–12)
NEUTROPHILS NFR BLD AUTO: 2.1 10*3/MM3 (ref 1.7–7)
NEUTROPHILS NFR BLD AUTO: 42.3 % (ref 42.7–76)
NRBC BLD AUTO-RTO: 0 /100 WBC (ref 0–0.2)
PHOSPHATE SERPL-MCNC: 5.7 MG/DL (ref 2.5–4.5)
PLATELET # BLD AUTO: 50 10*3/MM3 (ref 140–450)
PMV BLD AUTO: 8 FL (ref 6–12)
POTASSIUM SERPL-SCNC: 4.4 MMOL/L (ref 3.5–5.2)
PROT SERPL-MCNC: 7.1 G/DL (ref 6–8.5)
RBC # BLD AUTO: 2.75 10*6/MM3 (ref 3.77–5.28)
SODIUM SERPL-SCNC: 136 MMOL/L (ref 136–145)
WBC NRBC COR # BLD: 4.9 10*3/MM3 (ref 3.4–10.8)

## 2023-11-06 PROCEDURE — 96366 THER/PROPH/DIAG IV INF ADDON: CPT

## 2023-11-06 PROCEDURE — 82948 REAGENT STRIP/BLOOD GLUCOSE: CPT

## 2023-11-06 PROCEDURE — 80053 COMPREHEN METABOLIC PANEL: CPT | Performed by: INTERNAL MEDICINE

## 2023-11-06 PROCEDURE — 99214 OFFICE O/P EST MOD 30 MIN: CPT | Performed by: INTERNAL MEDICINE

## 2023-11-06 PROCEDURE — 96365 THER/PROPH/DIAG IV INF INIT: CPT

## 2023-11-06 PROCEDURE — 85025 COMPLETE CBC W/AUTO DIFF WBC: CPT | Performed by: INTERNAL MEDICINE

## 2023-11-06 PROCEDURE — G0378 HOSPITAL OBSERVATION PER HR: HCPCS

## 2023-11-06 PROCEDURE — 63710000001 INSULIN LISPRO (HUMAN) PER 5 UNITS: Performed by: INTERNAL MEDICINE

## 2023-11-06 PROCEDURE — 25010000002 MAGNESIUM SULFATE 2 GM/50ML SOLUTION: Performed by: INTERNAL MEDICINE

## 2023-11-06 PROCEDURE — 84100 ASSAY OF PHOSPHORUS: CPT | Performed by: INTERNAL MEDICINE

## 2023-11-06 PROCEDURE — 97162 PT EVAL MOD COMPLEX 30 MIN: CPT

## 2023-11-06 PROCEDURE — 63710000001 INSULIN LISPRO (HUMAN) PER 5 UNITS: Performed by: NURSE PRACTITIONER

## 2023-11-06 PROCEDURE — 63710000001 INSULIN GLARGINE PER 5 UNITS: Performed by: NURSE PRACTITIONER

## 2023-11-06 PROCEDURE — 63710000001 PREDNISONE PER 1 MG: Performed by: INTERNAL MEDICINE

## 2023-11-06 PROCEDURE — 83735 ASSAY OF MAGNESIUM: CPT | Performed by: INTERNAL MEDICINE

## 2023-11-06 PROCEDURE — 63710000001 INSULIN GLARGINE PER 5 UNITS: Performed by: INTERNAL MEDICINE

## 2023-11-06 RX ORDER — INSULIN LISPRO 100 [IU]/ML
1-200 INJECTION, SOLUTION INTRAVENOUS; SUBCUTANEOUS AS NEEDED
Status: DISCONTINUED | OUTPATIENT
Start: 2023-11-06 | End: 2023-11-08 | Stop reason: HOSPADM

## 2023-11-06 RX ORDER — MAGNESIUM SULFATE HEPTAHYDRATE 40 MG/ML
2 INJECTION, SOLUTION INTRAVENOUS
Status: COMPLETED | OUTPATIENT
Start: 2023-11-06 | End: 2023-11-06

## 2023-11-06 RX ORDER — MAGNESIUM SULFATE HEPTAHYDRATE 40 MG/ML
2 INJECTION, SOLUTION INTRAVENOUS
Status: CANCELLED | OUTPATIENT
Start: 2023-11-06 | End: 2023-11-06

## 2023-11-06 RX ORDER — HYDROXYZINE HYDROCHLORIDE 25 MG/1
25 TABLET, FILM COATED ORAL 3 TIMES DAILY PRN
Status: DISCONTINUED | OUTPATIENT
Start: 2023-11-06 | End: 2023-11-07

## 2023-11-06 RX ORDER — PREDNISONE 20 MG/1
20 TABLET ORAL
Qty: 5 TABLET | Refills: 0 | Status: DISCONTINUED | OUTPATIENT
Start: 2023-11-06 | End: 2023-11-08 | Stop reason: HOSPADM

## 2023-11-06 RX ORDER — DEXTROSE MONOHYDRATE 25 G/50ML
10-50 INJECTION, SOLUTION INTRAVENOUS
Status: DISCONTINUED | OUTPATIENT
Start: 2023-11-06 | End: 2023-11-08 | Stop reason: HOSPADM

## 2023-11-06 RX ORDER — INSULIN LISPRO 100 [IU]/ML
1-200 INJECTION, SOLUTION INTRAVENOUS; SUBCUTANEOUS
Status: DISCONTINUED | OUTPATIENT
Start: 2023-11-07 | End: 2023-11-08 | Stop reason: HOSPADM

## 2023-11-06 RX ORDER — NICOTINE POLACRILEX 4 MG
15 LOZENGE BUCCAL
Status: DISCONTINUED | OUTPATIENT
Start: 2023-11-06 | End: 2023-11-08 | Stop reason: HOSPADM

## 2023-11-06 RX ADMIN — MAGNESIUM SULFATE HEPTAHYDRATE 2 G: 2 INJECTION, SOLUTION INTRAVENOUS at 09:20

## 2023-11-06 RX ADMIN — INSULIN GLARGINE 10 UNITS: 100 INJECTION, SOLUTION SUBCUTANEOUS at 09:20

## 2023-11-06 RX ADMIN — INSULIN GLARGINE 10 UNITS: 100 INJECTION, SOLUTION SUBCUTANEOUS at 21:23

## 2023-11-06 RX ADMIN — ALPRAZOLAM 0.5 MG: 0.5 TABLET ORAL at 20:37

## 2023-11-06 RX ADMIN — TIZANIDINE 4 MG: 4 TABLET ORAL at 20:35

## 2023-11-06 RX ADMIN — INSULIN GLARGINE 16 UNITS: 100 INJECTION, SOLUTION SUBCUTANEOUS at 22:49

## 2023-11-06 RX ADMIN — INSULIN LISPRO 5 UNITS: 100 INJECTION, SOLUTION INTRAVENOUS; SUBCUTANEOUS at 09:19

## 2023-11-06 RX ADMIN — INSULIN LISPRO 8 UNITS: 100 INJECTION, SOLUTION INTRAVENOUS; SUBCUTANEOUS at 17:44

## 2023-11-06 RX ADMIN — HYDROXYZINE HYDROCHLORIDE 25 MG: 25 TABLET, FILM COATED ORAL at 18:07

## 2023-11-06 RX ADMIN — GABAPENTIN 600 MG: 300 CAPSULE ORAL at 17:44

## 2023-11-06 RX ADMIN — GABAPENTIN 600 MG: 300 CAPSULE ORAL at 20:35

## 2023-11-06 RX ADMIN — GABAPENTIN 600 MG: 300 CAPSULE ORAL at 09:19

## 2023-11-06 RX ADMIN — MAGNESIUM SULFATE HEPTAHYDRATE 2 G: 2 INJECTION, SOLUTION INTRAVENOUS at 07:27

## 2023-11-06 RX ADMIN — MIRTAZAPINE 15 MG: 15 TABLET, FILM COATED ORAL at 20:35

## 2023-11-06 RX ADMIN — INSULIN LISPRO 5 UNITS: 100 INJECTION, SOLUTION INTRAVENOUS; SUBCUTANEOUS at 12:41

## 2023-11-06 RX ADMIN — PREDNISONE 20 MG: 20 TABLET ORAL at 12:41

## 2023-11-06 RX ADMIN — INSULIN LISPRO 9 UNITS: 100 INJECTION, SOLUTION INTRAVENOUS; SUBCUTANEOUS at 21:23

## 2023-11-06 RX ADMIN — INSULIN LISPRO 6 UNITS: 100 INJECTION, SOLUTION INTRAVENOUS; SUBCUTANEOUS at 22:49

## 2023-11-06 RX ADMIN — INSULIN LISPRO 5 UNITS: 100 INJECTION, SOLUTION INTRAVENOUS; SUBCUTANEOUS at 17:44

## 2023-11-06 RX ADMIN — MAGNESIUM SULFATE HEPTAHYDRATE 2 G: 2 INJECTION, SOLUTION INTRAVENOUS at 04:52

## 2023-11-06 NOTE — PLAN OF CARE
Goal Outcome Evaluation:  Plan of Care Reviewed With: patient        Progress: no change            Problem: Adult Inpatient Plan of Care  Goal: Plan of Care Review  Outcome: Ongoing, Progressing  Flowsheets (Taken 11/6/2023 1530)  Progress: no change  Plan of Care Reviewed With: patient  Goal: Readiness for Transition of Care  Outcome: Ongoing, Progressing     Problem: Skin Injury Risk Increased  Goal: Skin Health and Integrity  Outcome: Ongoing, Progressing     Problem: Fall Injury Risk  Goal: Absence of Fall and Fall-Related Injury  Outcome: Ongoing, Progressing  Intervention: Promote Injury-Free Environment  Recent Flowsheet Documentation  Taken 11/6/2023 1448 by Jazz Godoy RN  Safety Promotion/Fall Prevention: safety round/check completed  Taken 11/6/2023 1241 by Jazz Godoy RN  Safety Promotion/Fall Prevention: safety round/check completed  Taken 11/6/2023 1047 by Jazz Godoy RN  Safety Promotion/Fall Prevention: safety round/check completed  Taken 11/6/2023 0813 by Jazz Godoy RN  Safety Promotion/Fall Prevention: safety round/check completed  Taken 11/6/2023 0735 by Jazz Godoy RN  Safety Promotion/Fall Prevention: safety round/check completed  Taken 11/6/2023 0638 by Jazz Godoy RN  Safety Promotion/Fall Prevention: safety round/check completed     Problem: Mobility Impairment  Goal: Optimal Mobility  Outcome: Ongoing, Progressing  Intervention: Optimize Mobility  Recent Flowsheet Documentation  Taken 11/6/2023 0813 by Jazz Godoy RN  Activity Management: back to bed     Patient is voiding adequately throughout the day. Patient working with physical therapy to gain strength. Patient is currently resting, no distress noted at this time.

## 2023-11-06 NOTE — CONSULTS
"Diabetes Education  Assessment/Teaching    Patient Name:  Nieves Mc  YOB: 1975  MRN: [id number]  Admit Date:  11/4/2023      Assessment Date:  11/6/2023  Flowsheet Row Most Recent Value   General Information     Referral From: A1c, Blood glucose, MD order  [MD consult for blood glucose >200, admission blood sugar 304, and on 11/4/2023 A1c was 8.1%.]   Height 162.6 cm (64\")   Height Method Stated   Weight 64.7 kg (142 lb 10.2 oz)   Weight Method Bed scale   Pregnancy Assessment    Diabetes History    What type of diabetes do you have? Type 2   Length of Diabetes Diagnosis 1 - 5 years  [2018]   Current DM knowledge fair   Have you had diabetes education/teaching in the past? yes   When and where was your diabetes education? Inpatient hospitalizations at [facility] with last one being 6/26/2023   Do you test your blood sugar at home? no   Have you had high blood sugar? (>140mg/dl) yes   How often do you have high blood sugar? unknown   When was your last high blood sugar? Admission blood sugar 304   Education Preferences    What areas of diabetes would you like to learn about? avoiding high blood sugar, diabetes complications, testing my blood sugar at home   Nutrition Information    Assessment Topics    Problem Solving - Assessment Needs education   Reducing Risk - Assessment Needs education   Monitoring - Assessment Needs education   DM Goals    Problem Solving - Goal Today   Reducing Risk - Goal Today   Monitoring - Goal Today            Flowsheet Row Most Recent Value   DM Education Needs    Meter Other (comment)  [Needs sensors]   Meter Type Freestyle   Medication Other (comment)  [Needs prescriptions for diabetes meds]   Problem Solving Hyperglycemia, Signs, Symptoms, Treatment   Reducing Risks A1C testing  [On 11/4/2023 A1c was 8.1%.]   Discharge Plan Home   Motivation Moderate   Teaching Method Discussion, Handouts   Patient Response Verbalized understanding              Other Comments:  A1c info " sheet given with discussion on A1c target and healthy blood sugar range. Patient stated she currently doesn't have a PCP and doesn't have prescriptions for her diabetes meds or sensors. Patient stated she is blind in her right eye and is unable to see the numbers on a glucometer. She can read the numbers on her phone when she is on the continuous glucose monitor. Patient stated she has an appointment with a PCP at the beginning of the year when her new insurance benefits start. Called outpatient pharmacy to find what meds and if sensors are covered. Prescriptions started in discharge orders for Lantus, Humalog, pen needles, Farxiga, and Freestyle Zahira 2 sensors. Patient has no further questions or concerns related to diabetes at this time.        Electronically signed by:  Micaela Melvin RN  11/06/23 12:10 EST

## 2023-11-06 NOTE — PLAN OF CARE
Goal Outcome Evaluation:  Plan of Care Reviewed With: patient        Progress: no change  Outcome Evaluation: Pt is a 47 y/o F admitted to [facility] after noticing a rash RUE. Pt with significant PMH, including CML, T2DM. Pt reports recent fall with L hip fracture in June, and reports no treatment for it. Pt reports she was indep with mobility prior to her fall, but currently she's been functioning from w/c, has reduced activation BLE, and requires assist from her spouse for transfers. Pt and her spouse currently live in a hotel as they were recently evicted and have not been able to find another apartment. Pt reports significant hx of falls, even since her last fall, usually occurring during her transfers. Pt requires Angel with bed mobiltiy, unable to assess OOB mobiltiy as would need extra set of hands for safety. Pt is far below baseline function and appears significantly unsafe for d/c back home. PT recommending SNF upon d/c, but pt refusing therapy. She reports she does not want to function from her w/c the rest of her life, but also reports no interest in therapy. PT will continue to folllow 3x/wk to assess safety with transfers.      Anticipated Discharge Disposition (PT): skilled nursing facility

## 2023-11-06 NOTE — PROGRESS NOTES
Pt is current with [facility]. Sharon MICHAUD notified we are following

## 2023-11-06 NOTE — THERAPY EVALUATION
Patient Name: Nieves Mc  : 1975    MRN: [id number]                              Today's Date: 2023       Admit Date: 2023    Visit Dx:     ICD-10-CM ICD-9-CM   1. Petechial rash  R23.3 782.7   2. 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    Acute diastolic CHF (congestive heart failure)    Menorrhagia    Tumor lysis syndrome    Acute respiratory failure with hypoxia    Thrombocytopenia    Hyperuricemia    NICM (nonischemic cardiomyopathy)    Dyspnea, unspecified type    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    Chronic pain    Chronic narcotic dependence    Acute respiratory failure with hypoxia    Dehydration    Vomiting    Chest pain, unspecified type    Bilateral lower extremity edema    Pancytopenia    Traumatic subdural hemorrhage without loss of consciousness    Bilateral subdural hematomas    Lumbar radiculopathy    Hypomagnesemia    Right upper quadrant abdominal pain    History of migraine    Fall during current hospitalization    Pubic ramus fracture, left, closed, initial encounter    Nausea    UTI (urinary tract infection)    Frequent falls    Chronic constipation    Diabetic gastroparesis    Encounter for blood transfusion     Past Medical History:   Diagnosis Date    Bone pain     Chronic constipation 2023    COVID-19 virus infection 2022    Diabetes mellitus     Diabetic gastroparesis 2023    Extremity pain     [place]. legs pain    Fall  during current hospitalization 2023    Leg pain     left leg greater    Migraine     Pubic ramus fracture, left, closed, initial encounter 2023    Pulmonary embolism     Vision loss     doing surgery     Past Surgical History:   Procedure Laterality Date    BONE MARROW BIOPSY      BREAST SURGERY      BRONCHOSCOPY N/A 2022    Procedure: BRONCHOSCOPY bilateral lung washing;  Surgeon: Charlene Camara MD;  Location: [facility] ENDOSCOPY;  Service: Pulmonary;  Laterality: N/A;  post: rule out infection vs transfusion lung injury     SECTION      CHOLECYSTECTOMY      COLONOSCOPY N/A 2023    Procedure: COLONOSCOPY;  Surgeon: Freddy Villeda MD;  Location: [facility] ENDOSCOPY;  Service: Gastroenterology;  Laterality: N/A;  poor prep    ENDOSCOPY N/A 2023    Procedure: ESOPHAGOGASTRODUODENOSCOPY with biopsy x2 areas;  Surgeon: Freddy Villeda MD;  Location: [facility] ENDOSCOPY;  Service: Gastroenterology;  Laterality: N/A;  food in stomach;abnormal duodenal mucosa    EYE SURGERY      laser surgery due  to hemmorage--- 2021-- another surgery  lt eye11/15/21    RETINAL DETACHMENT SURGERY      SPINE SURGERY      [place] spinal block    TUBAL ABDOMINAL LIGATION        General Information       Row Name 23 1615          Physical Therapy Time and Intention    Document Type evaluation  -OD     Mode of Treatment physical therapy  -OD       Row Name 23 1615          General Information    Prior Level of Function max assist:;dependent:  Pt with recent mobility decline, reporting fall with L hip fracture in  with no treatment. Functions from W/c with RW for transfers and support from her spouse  -OD     Existing Precautions/Restrictions fall  -OD     Barriers to Rehab medically complex;previous functional deficit  -OD       Row Name 23 1615          Living Environment    People in Home spouse  -OD       Row Name 23 1615          Home Main Entrance    Number of Stairs, Main  Entrance other (see comments)  Pt reports her spouse and her were evicted and currently live in a hotel  -OD       Row Name 11/06/23 1615          Stairs Within Home, Primary    Number of Stairs, Within Home, Primary none  -OD       Row Name 11/06/23 1615          Cognition    Orientation Status (Cognition) oriented x 4  -OD       Row Name 11/06/23 1615          Safety Issues, Functional Mobility    Impairments Affecting Function (Mobility) balance;endurance/activity tolerance;strength;sensation/sensory awareness;pain;motor control;motor planning  -OD               User Key  (r) = Recorded By, (t) = Taken By, (c) = Cosigned By      Initials Name Provider Type    OD Kathrin Leung, PT Physical Therapist                   Mobility       Row Name 11/06/23 1616          Bed Mobility    Bed Mobility bed mobility (all) activities  -OD     All Activities, [place] (Bed Mobility) minimum assist (75% patient effort)  -OD     Comment, (Bed Mobility) Pt requires assist with BLE as she demo no activation quads, hips, knees.  -OD       Row Name 11/06/23 1616          Bed-Chair Transfer    Bed-Chair [place] (Transfers) not tested  -OD     Comment, (Bed-Chair Transfer) Pt reports 3-person assist per RN and usually requires maxA from her spouse at baseline  -OD       Row Name 11/06/23 1616          Sit-Stand Transfer    Sit-Stand [place] (Transfers) not tested  -OD               User Key  (r) = Recorded By, (t) = Taken By, (c) = Cosigned By      Initials Name Provider Type    Kathrin Spaulding PT Physical Therapist                   Obj/Interventions       Row Name 11/06/23 1617          Range of Motion Comprehensive    General Range of Motion lower extremity range of motion deficits identified  -OD     Comment, General Range of Motion Pt demo limited BLE AROM  -OD       Row Name 11/06/23 1617          Strength Comprehensive (MMT)    General Manual Muscle Testing (MMT) Assessment lower extremity strength deficits  identified  -OD     Comment, General Manual Muscle Testing (MMT) Assessment Pt demo 1/5 hip flexion, knee ext, knee flexion strength BLE  -OD       Row Name 11/06/23 1617          Balance    Balance Interventions sitting;minimal challenge  -OD       Row Name 11/06/23 1617          Sensory Assessment (Somatosensory)    Sensory Assessment (Somatosensory) other (see comments)  Pt reports neuropathy BLE  -OD               User Key  (r) = Recorded By, (t) = Taken By, (c) = Cosigned By      Initials Name Provider Type    Kathrin Spaulding, MEMO Physical Therapist                   Goals/Plan       Row Name 11/06/23 1623          Bed Mobility Goal 1 (PT)    Activity/Assistive Device (Bed Mobility Goal 1, PT) bed mobility activities, all  -OD     [place] Level/Cues Needed (Bed Mobility Goal 1, PT) modified independence  -OD     Time Frame (Bed Mobility Goal 1, PT) long term goal (LTG);2 weeks  -OD       Row Name 11/06/23 1623          Transfer Goal 1 (PT)    Activity/Assistive Device (Transfer Goal 1, PT) transfers, all;walker, rolling  -OD     [place] Level/Cues Needed (Transfer Goal 1, PT) minimum assist (75% or more patient effort)  -OD     Time Frame (Transfer Goal 1, PT) long term goal (LTG);2 weeks  -OD       Row Name 11/06/23 1623          ROM Goal 1 (PT)    ROM Goal 1 (PT) Pt will demo improved AROM BLE hip flexion/knee flexion for improving ability to participate in transfers.  -OD     Time Frame (ROM Goal 1, PT) long-term goal (LTG);2 weeks  -OD       Row Name 11/06/23 1623          Therapy Assessment/Plan (PT)    Planned Therapy Interventions (PT) balance training;bed mobility training;gait training;home exercise program;neuromuscular re-education;ROM (range of motion);postural re-education;transfer training;strengthening;stretching;patient/family education  -OD               User Key  (r) = Recorded By, (t) = Taken By, (c) = Cosigned By      Initials Name Provider Type    Kathrin Spaulding PT Physical  Therapist                   Clinical Impression       Row Name 11/06/23 1618          Pain    Additional Documentation Pain Scale: FACES Pre/Post-Treatment (Group)  -OD       Row Name 11/06/23 1618          Pain Scale: FACES Pre/Post-Treatment    Pain: FACES Scale, Pretreatment 4-->hurts little more  -OD     Posttreatment Pain Rating 4-->hurts little more  -OD     Pain Location - Side/Orientation Left  -OD     Pain Location generalized  -OD     Pain Location - hip  -OD       Row Name 11/06/23 1618          Plan of Care Review    Plan of Care Reviewed With patient  -OD     Progress no change  -OD     Outcome Evaluation Pt is a 49 y/o F admitted to [facility] after noticing a rash RUE. Pt with significant PMH, including CML, T2DM. Pt reports recent fall with L hip fracture in June, and reports no treatment for it. Pt reports she was indep with mobility prior to her fall, but currently she's been functioning from w/c, has reduced activation BLE, and requires assist from her spouse for transfers. Pt and her spouse currently live in a hotel as they were recently evicted and have not been able to find another apartment. Pt reports significant hx of falls, even since her last fall, usually occurring during her transfers. Pt requires Angel with bed mobiltiy, unable to assess OOB mobiltiy as would need extra set of hands for safety. Pt is far below baseline function and appears significantly unsafe for d/c back home. PT recommending SNF upon d/c, but pt refusing therapy. She reports she does not want to function from her w/c the rest of her life, but also reports no interest in therapy. PT will continue to folllow 3x/wk to assess safety with transfers.  -OD       Row Name 11/06/23 1615          Therapy Assessment/Plan (PT)    Rehab Potential (PT) good, to achieve stated therapy goals  -OD     Criteria for Skilled Interventions Met (PT) yes;meets criteria  -OD     Therapy Frequency (PT) 3 times/wk  -OD     Predicted Duration of  Therapy Intervention (PT) until d/c  -OD       Row Name 11/06/23 1618          Vital Signs    O2 Delivery Pre Treatment room air  -OD     O2 Delivery Intra Treatment room air  -OD     O2 Delivery Post Treatment room air  -OD     Pre Patient Position Sitting  -OD     Intra Patient Position Supine  -OD     Post Patient Position Sitting  -OD       Row Name 11/06/23 1618          Positioning and Restraints    Pre-Treatment Position in bed  -OD     Post Treatment Position bed  -OD     In Bed call light within reach;encouraged to call for assist;with family/caregiver;sitting;notified nsg  -OD               User Key  (r) = Recorded By, (t) = Taken By, (c) = Cosigned By      Initials Name Provider Type    Kathrin Spaulding, MEMO Physical Therapist                   Outcome Measures       Row Name 11/06/23 1624 11/06/23 0735       How much help from another person do you currently need...    Turning from your back to your side while in flat bed without using bedrails? 3  -OD 4  -LF    Moving from lying on back to sitting on the side of a flat bed without bedrails? 3  -OD 4  -LF    Moving to and from a bed to a chair (including a wheelchair)? 2  -OD 2  -LF    Standing up from a chair using your arms (e.g., wheelchair, bedside chair)? 2  -OD 1  -LF    Climbing 3-5 steps with a railing? 1  -OD 1  -LF    To walk in hospital room? 1  -OD 1  -LF    AM-PAC 6 Clicks Score (PT) 12  -OD 13  -LF    Highest level of mobility 4 --> Transferred to chair/commode  -OD 4 --> Transferred to chair/commode  -LF      Row Name 11/06/23 1624          Functional Assessment    Outcome Measure Options AM-PAC 6 Clicks Basic Mobility (PT)  -OD               User Key  (r) = Recorded By, (t) = Taken By, (c) = Cosigned By      Initials Name Provider Type    LF Jazz Godoy, RN Registered Nurse    Kathrin Spaulding, MEMO Physical Therapist                                 Physical Therapy Education       Title: PT OT SLP Therapies (In Progress)       Topic:  Physical Therapy (In Progress)       Point: Mobility training (In Progress)       Learning Progress Summary             Patient Acceptance, E, NR by OD at 11/6/2023 1625                         Point: Home exercise program (In Progress)       Learning Progress Summary             Patient Acceptance, E, NR by OD at 11/6/2023 1625                         Point: Body mechanics (In Progress)       Learning Progress Summary             Patient Acceptance, E, NR by OD at 11/6/2023 1625                         Point: Precautions (In Progress)       Learning Progress Summary             Patient Acceptance, E, NR by OD at 11/6/2023 1625                                         User Key       Initials Effective Dates Name Provider Type Discipline    OD 05/11/23 -  Kathrin Leung, PT Physical Therapist PT                  PT Recommendation and Plan  Planned Therapy Interventions (PT): balance training, bed mobility training, gait training, home exercise program, neuromuscular re-education, ROM (range of motion), postural re-education, transfer training, strengthening, stretching, patient/family education  Plan of Care Reviewed With: patient  Progress: no change  Outcome Evaluation: Pt is a 47 y/o F admitted to [facility] after noticing a rash RUE. Pt with significant PMH, including CML, T2DM. Pt reports recent fall with L hip fracture in June, and reports no treatment for it. Pt reports she was indep with mobility prior to her fall, but currently she's been functioning from w/c, has reduced activation BLE, and requires assist from her spouse for transfers. Pt and her spouse currently live in a hotel as they were recently evicted and have not been able to find another apartment. Pt reports significant hx of falls, even since her last fall, usually occurring during her transfers. Pt requires Angel with bed mobiltiy, unable to assess OOB mobiltiy as would need extra set of hands for safety. Pt is far below baseline function and appears  significantly unsafe for d/c back home. PT recommending SNF upon d/c, but pt refusing therapy. She reports she does not want to function from her w/c the rest of her life, but also reports no interest in therapy. PT will continue to folllow 3x/wk to assess safety with transfers.     Time Calculation:         PT Charges       Row Name 11/06/23 1625             Time Calculation    Start Time 1555  -OD      Stop Time 1615  -OD      Time Calculation (min) 20 min  -OD      PT Received On 11/06/23  -OD      PT - Next Appointment 11/08/23  -OD      PT Goal Re-Cert Due Date 11/20/23  -OD         Time Calculation- PT    Total Timed Code Minutes- PT 0 minute(s)  -OD                User Key  (r) = Recorded By, (t) = Taken By, (c) = Cosigned By      Initials Name Provider Type    OD Kathrin Leung, PT Physical Therapist                  Therapy Charges for Today       Code Description Service Date Service Provider Modifiers Qty    [id number] HC PT EVAL MOD COMPLEXITY 4 11/6/2023 Kathrin Leung, PT GP 1            PT G-Codes  Outcome Measure Options: AM-PAC 6 Clicks Basic Mobility (PT)  AM-PAC 6 Clicks Score (PT): 12  PT Discharge Summary  Anticipated Discharge Disposition (PT): skilled nursing facility    Kathrin Leung PT  11/6/2023

## 2023-11-06 NOTE — PLAN OF CARE
Goal Outcome Evaluation:      Pt with no complaints at this time. Up to BSC with staff assistance. Vital signs are stable. Call light within reach. Plan of care ongoing.

## 2023-11-06 NOTE — PROGRESS NOTES
Hematology/Oncology Inpatient Progress Note    PATIENT NAME: Nieves Mc  : 1975  MRN: [id number]    CHIEF COMPLAINT: Rash    HISTORY OF PRESENT ILLNESS:      Nieves Mc is a 48 y.o. female who presented to [facility] on 2023 with complaints of rash. PMHx of accelerated phase CML, diabetes mellitus, chronic pain, pulmonary embolism. Patient reported that the rash was nonpainful and developed the morning of her presentation to the ED.  No other associated symptoms. She noted the rash on her right hand, bilateral forearms, left face.  She reported new chemotherapy medication 2 weeks prior-Sprycel, and receiving blood transfusion on 10/31/2023.      23  Hematology/Oncology was consulted on a patient known to us and followed in the office by Dr. Lerma for her diagnosis of CML.  Patient initially presented in  when she was seen in consultation while hospitalized. At that time she was on imatinib.  In  she had progressive thrombocytosis and was transitioned to nilotinib.  After that she was lost to follow-up until she was once again seen during hospitalization in .  She was continued on nilotinib and hydroxyurea.  In 2022 she had once again been lost to follow-up when she presented to the office after not taking nilotinib during that time. She had SOA and cough for which a 2D echo was performed and showed reduced EF.  Due to cardiomyopathy she was transitioned to imatinib and hydroxyurea.  Bone marrow biopsy in 2022 showed her to be in accelerated phase CML and that imatinib was not controlling her malignancy.  She was initiated on ponatinib but required multiple dose reductions due to side effects of bone pain and cytopenias and eventual discontinuation. She was transitioned to dasatinib on 10/10/2023.      She had noted rash on the right upper extremity left upper extremity and a few areas on lower extremity.  She has been on Sprycel for CML.  She denies  any fevers or chills or contact with anybody that has been ill recently.  She did complain of worsening left upper abdominal pain.  She denies any nausea.     He/She  has a past medical history of Bone pain, Chronic constipation (07/07/2023), COVID-19 virus infection (01/12/2022), Diabetes mellitus, Diabetic gastroparesis (07/07/2023), Extremity pain, Fall during current hospitalization (06/25/2023), Leg pain, Migraine, Pubic ramus fracture, left, closed, initial encounter (06/25/2023), Pulmonary embolism, and Vision loss.     PCP: Provider, No Known    Subjective     No significant changes overnight    ROS:  Review of Systems   Skin:  Positive for rash.        Right upper extremity rash        MEDICATIONS:    Scheduled Meds:  budesonide-formoterol, 2 puff, Inhalation, BID - RT  furosemide, 40 mg, Oral, Daily  gabapentin, 600 mg, Oral, TID  insulin glargine, 10 Units, Subcutaneous, Q12H  insulin lispro, 2-9 Units, Subcutaneous, 4x Daily AC & at Bedtime  insulin lispro, 5 Units, Subcutaneous, TID With Meals  mirtazapine, 15 mg, Oral, Nightly  predniSONE, 20 mg, Oral, Daily With Breakfast  senna-docusate sodium, 2 tablet, Oral, BID  tiZANidine, 4 mg, Oral, Daily       Continuous Infusions:      PRN Meds:    acetaminophen **OR** acetaminophen **OR** acetaminophen    ALPRAZolam    aluminum-magnesium hydroxide-simethicone    senna-docusate sodium **AND** polyethylene glycol **AND** bisacodyl **AND** bisacodyl    Calcium Replacement - Follow Nurse / BPA Driven Protocol    dextrose    dextrose    glucagon (human recombinant)    Magnesium Standard Dose Replacement - Follow Nurse / BPA Driven Protocol    melatonin    ondansetron **OR** ondansetron    Phosphorus Replacement - Follow Nurse / BPA Driven Protocol    Potassium Replacement - Follow Nurse / BPA Driven Protocol    [COMPLETED] Insert Peripheral IV **AND** sodium chloride     ALLERGIES:  No Known Allergies    Objective    VITALS:   /70 (BP Location: Right arm,  "Patient Position: Lying)   Pulse 106   Temp 97.9 °F (36.6 °C) (Oral)   Resp 18   Ht 162.6 cm (64\")   Wt 64.7 kg (142 lb 10.2 oz)   LMP 05/25/2022 (Approximate)   SpO2 96%   BMI 24.48 kg/m²     PHYSICAL EXAM: (performed by MD)  Physical Exam  Vitals and nursing note reviewed.   Constitutional:       General: She is not in acute distress.     Appearance: She is not diaphoretic.   HENT:      Head: Normocephalic and atraumatic.   Eyes:      General: No scleral icterus.        Right eye: No discharge.         Left eye: No discharge.      Conjunctiva/sclera: Conjunctivae normal.   Neck:      Thyroid: No thyromegaly.   Cardiovascular:      Rate and Rhythm: Normal rate and regular rhythm.      Heart sounds: Normal heart sounds.      No friction rub. No gallop.   Pulmonary:      Effort: Pulmonary effort is normal. No respiratory distress.      Breath sounds: No stridor. No wheezing.   Abdominal:      General: Bowel sounds are normal.      Palpations: Abdomen is soft. There is no mass.      Tenderness: There is no abdominal tenderness. There is no guarding or rebound.   Musculoskeletal:         General: No tenderness. Normal range of motion.      Cervical back: Normal range of motion and neck supple.   Lymphadenopathy:      Cervical: No cervical adenopathy.   Skin:     General: Skin is warm.      Findings: Rash present. No erythema.      Comments: Right upper extremity   Neurological:      Mental Status: She is alert and oriented to person, place, and time.      Motor: No abnormal muscle tone.   Psychiatric:         Behavior: Behavior normal.           RECENT LABS:  Lab Results (last 24 hours)       Procedure Component Value Units Date/Time    POC Glucose Once [[id number]]  (Abnormal) Collected: 11/06/23 1107    Specimen: Blood Updated: 11/06/23 1111     Glucose 147 mg/dL      Comment: Serial Number: [id number]:  [id number]       POC Glucose Once [[id number]]  (Abnormal) Collected: 11/06/23 0711    Specimen: Blood " Updated: 11/06/23 0712     Glucose 158 mg/dL      Comment: Serial Number: [id number]:  [id number]       Phosphorus [[id number]]  (Abnormal) Collected: 11/06/23 0340    Specimen: Blood Updated: 11/06/23 0458     Phosphorus 5.7 mg/dL     Magnesium [[id number]]  (Abnormal) Collected: 11/06/23 0340    Specimen: Blood Updated: 11/06/23 0426     Magnesium 1.5 mg/dL     Comprehensive Metabolic Panel [[id number]]  (Abnormal) Collected: 11/06/23 0340    Specimen: Blood Updated: 11/06/23 0426     Glucose 131 mg/dL      BUN 25 mg/dL      Creatinine 0.65 mg/dL      Sodium 136 mmol/L      Potassium 4.4 mmol/L      Chloride 99 mmol/L      CO2 26.0 mmol/L      Calcium 11.1 mg/dL      Total Protein 7.1 g/dL      Albumin 3.8 g/dL      ALT (SGPT) 59 U/L      AST (SGOT) 41 U/L      Alkaline Phosphatase 441 U/L      Total Bilirubin 1.1 mg/dL      Globulin 3.3 gm/dL      A/G Ratio 1.2 g/dL      BUN/Creatinine Ratio 38.5     Anion Gap 11.0 mmol/L      eGFR 108.8 mL/min/1.73     Narrative:      GFR Normal >60  Chronic Kidney Disease <60  Kidney Failure <15      CBC & Differential [[id number]]  (Abnormal) Collected: 11/06/23 0340    Specimen: Blood Updated: 11/06/23 0408    Narrative:      The following orders were created for panel order CBC & Differential.  Procedure                               Abnormality         Status                     ---------                               -----------         ------                     CBC Auto Differential[[id number]]        Abnormal            Final result                 Please view results for these tests on the individual orders.    CBC Auto Differential [[id number]]  (Abnormal) Collected: 11/06/23 0340    Specimen: Blood Updated: 11/06/23 0408     WBC 4.90 10*3/mm3      RBC 2.75 10*6/mm3      Hemoglobin 8.0 g/dL      Hematocrit 24.3 %      MCV 88.4 fL      MCH 29.1 pg      MCHC 32.9 g/dL      RDW 16.4 %      RDW-SD 50.8 fl      MPV 8.0 fL      Platelets 50 10*3/mm3      Neutrophil % 42.3 %       Lymphocyte % 39.0 %      Monocyte % 16.2 %      Eosinophil % 1.4 %      Basophil % 1.1 %      Neutrophils, Absolute 2.10 10*3/mm3      Lymphocytes, Absolute 1.90 10*3/mm3      Monocytes, Absolute 0.80 10*3/mm3      Eosinophils, Absolute 0.10 10*3/mm3      Basophils, Absolute 0.10 10*3/mm3      nRBC 0.0 /100 WBC     POC Glucose Once [[id number]]  (Abnormal) Collected: 11/05/23 2101    Specimen: Blood Updated: 11/05/23 2102     Glucose 264 mg/dL      Comment: Serial Number: [id number]:  [id number]       POC Glucose Once [[id number]]  (Abnormal) Collected: 11/05/23 1535    Specimen: Blood Updated: 11/05/23 1536     Glucose 215 mg/dL      Comment: Serial Number: [id number]:  [id number]               PENDING RESULTS:     IMAGING REVIEWED:  No radiology results for the last day    Assessment & Plan     ASSESSMENT:    Accelerated phase CML, s/p bone marrow biopsy 11/3/2022:  On Sprycel as an outpatient since 10/11/2023 with some difficulty in tolerating due to side effects. On hold since admission due to cytopenias.   Thrombocytopenia: 50,000, with rash. No other noted signs of bleeding. Likely secondary to medication.    Normocytic Anemia: Hgb 8.0, stable and baseline for patient.  Received 1 unit pRBC 10/31/23 for Hgb of 6.9. Secondary to medication and CML.  Hx. Of PE:  has been on xarelto previously as an outpatient.  Hold anticoagulation for platelet less than 50k    PLAN:  Monitor CBC and transfuse for hemoglobin less than 7.0 and platelets less than 20,000  Continue to hold Sprycel for now  Prednisone 20 mg daily x 5 days  Likely discharge home tomorrow and continue to follow-up in the office        This is a 48-year-old female has a history of CML on multiple lines of treatment.  She is currently on Sprycel which was initiated a few weeks ago.  She had noted rash on the right upper extremity left upper extremity and a few areas on lower extremity.  She has been on Sprycel for CML.  She denies any  fevers or chills or contact with anybody that has been ill recently.  She did complain of worsening left upper abdominal pain.  She denies any nausea.     Physical exam she has petechial rash and mostly on the right forearm wrist, few area on the upper left arm medial aspect and scattered petechiae on both lower extremities.  She has no particular rash on the chest abdomen or her back.     I reviewed her labs, imaging studies progress notes  Thrombocytopenia is most likely secondary to her CML as well as Sprycel.  I do not feel the thrombocytopenia is the reason for the petechial rash.  This may be due to a viral exanthem though the distribution is unusual.  Trial of steroids   Consider Claritin but concern for QT prolongation especially with Sprycel  Continue to hold Sprycel  Daily CBCs  Hyperglycemia managed by primary  Discussed with patient    Electronically signed by Meghann Lerma MD, 11/08/23, 8:27 AM EST.

## 2023-11-06 NOTE — CASE MANAGEMENT/SOCIAL WORK
Discharge Planning Assessment   Mt     Patient Name: Nieves Mc  MRN: [id number]  Today's Date: 11/6/2023    Admit Date: 11/4/2023    Plan: DC PLAN: Routine home. Current with [facility]. and [facility] MWF 8-4. May need Transport at DC.       Discharge Needs Assessment       Row Name 11/06/23 1232       Living Environment    People in Home spouse    Current Living Arrangements home    Potentially Unsafe Housing Conditions none    In the past 12 months has the electric, gas, oil, or water company threatened to shut off services in your home? No    Primary Care Provided by self    Able to Return to Prior Arrangements yes       Resource/Environmental Concerns    Resource/Environmental Concerns none       Transportation Needs    In the past 12 months, has lack of transportation kept you from medical appointments or from getting medications? no    In the past 12 months, has lack of transportation kept you from meetings, work, or from getting things needed for daily living? No       Food Insecurity    Within the past 12 months, you worried that your food would run out before you got the money to buy more. Never true    Within the past 12 months, the food you bought just didn't last and you didn't have money to get more. Never true       Transition Planning    Patient/Family Anticipates Transition to home with family    Transportation Anticipated family or friend will provide       Discharge Needs Assessment    Equipment Currently Used at Home wheelchair;walker, rolling                   Discharge Plan       Row Name 11/06/23 5890       Plan    Plan DC PLAN: Routine home. Current with [facility]. and [facility] MWF 8-4. May need Transport at DC.    Plan Comments Met with patient at bedside, from routine home with spouse. Spouse helps with all of ADL's mostly wheelchair bound. Current with [facility]. Current with [facility] MWF from 8-4. No PCP is set up to get one January 1st.  Declines case management help in setting one up. [facility]acy is [facility] Able to afford medications and occassionally has transport issues. States may need transport at DC.                  Continued Care and Services - Admitted Since 11/4/2023       Home Medical Care       Service Provider Request Status Selected Services Address Phone Fax Patient Preferred    [facility] Accepted N/A [id number] [facility] [place] [id number] [phone] --                  Selected Continued Care - Episodes Includes continued care and service providers with selected services from the active episodes listed below      Oncology Episode start date: 3/15/2023   There are no active outsourced providers for this episode.                 Expected Discharge Date and Time       Expected Discharge Date Expected Discharge Time    Nov 7, 2023            Demographic Summary       Row Name 11/06/23 1231       General Information    Admission Type observation    Arrived From emergency department    Referral Source admission list    Reason for Consult discharge planning    Preferred Language English       Contact Information    Permission Granted to Share Info With     Contact Information Obtained for                    Functional Status       Row Name 11/06/23 1232       Functional Status    Usual Activity Tolerance moderate    Current Activity Tolerance moderate       Assessment of Health Literacy    How often do you have someone help you read hospital materials? Sometimes    How often do you have problems learning about your medical condition because of difficulty understanding written information? Sometimes    How often do you have a problem understanding what is told to you about your medical condition? Sometimes    How confident are you filling out medical forms by yourself? Somewhat    Health Literacy Moderate       Functional Status, IADL    Medications assistive equipment and person    Meal Preparation  assistive equipment and person    Housekeeping assistive equipment and person    Laundry assistive equipment and person    Shopping assistive equipment and person       Mental Status    General Appearance WDL WDL                    Sharon Lovett, RN

## 2023-11-06 NOTE — PROGRESS NOTES
Hospitalist Progress Note   Nieves Mc : 1975 MRN:[id number] LOS:0     Principal Problem: Thrombocytopenia     Reason for follow up: All the medical problems listed below    Summary     Nieves Mc is a 48 y.o. female who presented to [facility] on 2023 complaining of Non-pain painful rash that has developed over her right hand and distal forearm, also started noticing it slightly on her left face and left forearm.  She reports that she woke up this morning and noticed the rash for the first time.  No other associated symptoms.  She has CML and is a patient of Dr. Lerma. She started on a new chemo medication 2 weeks ago called Sprycel.  She had an outpatient PRBC transfusion on 10/31/2023.      Assessment / Plan     #Petechial rash 2/2 Worsening thrombocytopenia from ? ITP or chemo side effect   -presented with skin rash on bilateral UE ( R > L ) and also started on thighs   -recent bld transfusion on 10/31/23 for anemia  -chart review with thrombocytopenia in May 2023.   -Transfuse for hemoglobin less than 7, platelets less than 20  -Heme-onc consult placed   -Restart home medications, holding Sprycel until seen by heme-onc.      #CML   -has been on chemo for 5 years and recent change to Sprycel. And she has abdo pain more.   -monitor CBC daily     #DM   -SSI glargine edd lispro     Disposition: home, hopefully tomorrow once we see how she responds to her steroids    Subjective / Review of systems     Did okay overnight.  Denies any significant pain currently.  Platelets are up a bit today.  Seen by the diabetic educator for an A1c of 8.1.  Magnesium is a bit low at 1.5.  Phosphorus is high at 5.7.  She has mild chronic LFT elevation with a normal bilirubin.  Glucose has been elevated the last couple of days but looks better today.  She has not yet been started on steroids.  Extensive chart review for service turnover today. Discussed the case with the bedside nursing staff. The  patient's case was also discussed with the pharmacy and case management providers at multidisciplinary rounds. No other acute concerns.    Objective / Physical Exam   Vital signs:  Temp: 98.1 °F (36.7 °C)  BP: 110/68  Heart Rate: 117  Resp: 16  SpO2: 92 %  Weight: 64.7 kg (142 lb 10.2 oz)    Admission Weight: Weight: 55.8 kg (123 lb)  Current Weight: Weight: 64.7 kg (142 lb 10.2 oz)    Input/Output in last 24 hours:    Intake/Output Summary (Last 24 hours) at 11/6/2023 0834  Last data filed at 11/6/2023 0824  Gross per 24 hour   Intake 480 ml   Output --   Net 480 ml      Physical Exam   HENT:      Head: Normocephalic and atraumatic.      Nose: Nose normal.   Eyes:      Extraocular Movements: Extraocular movements intact.      Conjunctivae/sclera: Conjunctivae normal.      Pupils: Pupils are equal, round, and reactive to light.   Cardiovascular:      Rate and Rhythm: normal       Pulses: Normal pulses.      Heart sounds: Normal heart sounds.   Pulmonary:      Effort: normal      Breath sounds: normal   Abdominal:      General: Abdomen is flat. Bowel sounds are normal.      Palpations: Abdomen is soft.   Musculoskeletal:         General: Normal range of motion.      Cervical back: Normal range of motion and neck supple.   Skin:     Petechia rash in both UE and thigh +      Radiology and Labs     Results from last 7 days   Lab Units 11/06/23  0340 11/05/23  0938 11/04/23  1517 10/31/23  2204   WBC 10*3/mm3 4.90 4.00 6.10  --    HEMATOCRIT % 24.3* 23.6* 26.8* 25.4*   PLATELETS 10*3/mm3 50* 43* 50*  --       Results from last 7 days   Lab Units 11/06/23  0340 11/05/23  0938 11/04/23  1517   SODIUM mmol/L 136 137 136   POTASSIUM mmol/L 4.4 4.4 5.1   CHLORIDE mmol/L 99 101 102   CO2 mmol/L 26.0 24.0 23.0   BUN mg/dL 25* 19 26*   CREATININE mg/dL 0.65 0.63 0.69      Current medications   Scheduled Meds:    budesonide-formoterol, 2 puff, Inhalation, BID - RT  furosemide, 40 mg, Oral, Daily  gabapentin, 600 mg, Oral,  TID  insulin glargine, 10 Units, Subcutaneous, Q12H  insulin lispro, 2-9 Units, Subcutaneous, 4x Daily AC & at Bedtime  insulin lispro, 5 Units, Subcutaneous, TID With Meals  magnesium sulfate, 2 g, Intravenous, Q2H  mirtazapine, 15 mg, Oral, Nightly  senna-docusate sodium, 2 tablet, Oral, BID  tiZANidine, 4 mg, Oral, Daily      Yuniel Yancey MD   [facility]  11/06/23   08:34 EST

## 2023-11-07 LAB
ANION GAP SERPL CALCULATED.3IONS-SCNC: 11 MMOL/L (ref 5–15)
BASOPHILS # BLD AUTO: 0 10*3/MM3 (ref 0–0.2)
BASOPHILS NFR BLD AUTO: 0.5 % (ref 0–1.5)
BUN SERPL-MCNC: 31 MG/DL (ref 6–20)
BUN/CREAT SERPL: 33.7 (ref 7–25)
CALCIUM SPEC-SCNC: 10.2 MG/DL (ref 8.6–10.5)
CHLORIDE SERPL-SCNC: 96 MMOL/L (ref 98–107)
CO2 SERPL-SCNC: 26 MMOL/L (ref 22–29)
CREAT SERPL-MCNC: 0.92 MG/DL (ref 0.57–1)
DEPRECATED RDW RBC AUTO: 57.8 FL (ref 37–54)
EGFRCR SERPLBLD CKD-EPI 2021: 77 ML/MIN/1.73
EOSINOPHIL # BLD AUTO: 0 10*3/MM3 (ref 0–0.4)
EOSINOPHIL NFR BLD AUTO: 0.3 % (ref 0.3–6.2)
ERYTHROCYTE [DISTWIDTH] IN BLOOD BY AUTOMATED COUNT: 16.9 % (ref 12.3–15.4)
GLUCOSE BLDC GLUCOMTR-MCNC: 274 MG/DL (ref 70–105)
GLUCOSE BLDC GLUCOMTR-MCNC: 350 MG/DL (ref 70–105)
GLUCOSE BLDC GLUCOMTR-MCNC: 438 MG/DL (ref 70–105)
GLUCOSE BLDC GLUCOMTR-MCNC: 470 MG/DL (ref 70–105)
GLUCOSE BLDC GLUCOMTR-MCNC: 476 MG/DL (ref 70–105)
GLUCOSE SERPL-MCNC: 542 MG/DL (ref 65–99)
HCT VFR BLD AUTO: 24.7 % (ref 34–46.6)
HGB BLD-MCNC: 7.7 G/DL (ref 12–15.9)
INTERPRETATION: POSITIVE
LAB DIRECTOR NAME PROVIDER: ABNORMAL
LABORATORY COMMENT REPORT: ABNORMAL
LYMPHOCYTES # BLD AUTO: 1.1 10*3/MM3 (ref 0.7–3.1)
LYMPHOCYTES NFR BLD AUTO: 25.7 % (ref 19.6–45.3)
MAGNESIUM SERPL-MCNC: 2.2 MG/DL (ref 1.6–2.6)
MCH RBC QN AUTO: 28.4 PG (ref 26.6–33)
MCHC RBC AUTO-ENTMCNC: 31.1 G/DL (ref 31.5–35.7)
MCV RBC AUTO: 91.3 FL (ref 79–97)
MONOCYTES # BLD AUTO: 0.7 10*3/MM3 (ref 0.1–0.9)
MONOCYTES NFR BLD AUTO: 15.6 % (ref 5–12)
NEUTROPHILS NFR BLD AUTO: 2.6 10*3/MM3 (ref 1.7–7)
NEUTROPHILS NFR BLD AUTO: 57.9 % (ref 42.7–76)
NRBC BLD AUTO-RTO: 0.1 /100 WBC (ref 0–0.2)
PHOSPHATE SERPL-MCNC: 4.5 MG/DL (ref 2.5–4.5)
PLATELET # BLD AUTO: 47 10*3/MM3 (ref 140–450)
PMV BLD AUTO: 7.8 FL (ref 6–12)
POTASSIUM SERPL-SCNC: 5.1 MMOL/L (ref 3.5–5.2)
RBC # BLD AUTO: 2.7 10*6/MM3 (ref 3.77–5.28)
RBC MORPH BLD: NORMAL
REF LAB TEST METHOD: ABNORMAL
SMALL PLATELETS BLD QL SMEAR: NORMAL
SODIUM SERPL-SCNC: 133 MMOL/L (ref 136–145)
T(ABL1,BCR)B2A2/CONTROL BLD/T: 2.74 %
T(ABL1,BCR)B3A2/CONTROL BLD/T: ABNORMAL %
T(ABL1,BCR)E1A2/CONTROL BLD/T: 0.04 %
WBC MORPH BLD: NORMAL
WBC NRBC COR # BLD: 4.4 10*3/MM3 (ref 3.4–10.8)

## 2023-11-07 PROCEDURE — G0378 HOSPITAL OBSERVATION PER HR: HCPCS

## 2023-11-07 PROCEDURE — 82948 REAGENT STRIP/BLOOD GLUCOSE: CPT

## 2023-11-07 PROCEDURE — 96375 TX/PRO/DX INJ NEW DRUG ADDON: CPT

## 2023-11-07 PROCEDURE — 63710000001 PREDNISONE PER 1 MG: Performed by: INTERNAL MEDICINE

## 2023-11-07 PROCEDURE — 85025 COMPLETE CBC W/AUTO DIFF WBC: CPT | Performed by: INTERNAL MEDICINE

## 2023-11-07 PROCEDURE — 63710000001 INSULIN GLARGINE PER 5 UNITS: Performed by: NURSE PRACTITIONER

## 2023-11-07 PROCEDURE — 25010000002 DIPHENHYDRAMINE PER 50 MG: Performed by: FAMILY MEDICINE

## 2023-11-07 PROCEDURE — 83735 ASSAY OF MAGNESIUM: CPT | Performed by: INTERNAL MEDICINE

## 2023-11-07 PROCEDURE — 63710000001 INSULIN LISPRO (HUMAN) PER 5 UNITS: Performed by: NURSE PRACTITIONER

## 2023-11-07 PROCEDURE — 84100 ASSAY OF PHOSPHORUS: CPT | Performed by: FAMILY MEDICINE

## 2023-11-07 PROCEDURE — 80048 BASIC METABOLIC PNL TOTAL CA: CPT | Performed by: INTERNAL MEDICINE

## 2023-11-07 PROCEDURE — 85007 BL SMEAR W/DIFF WBC COUNT: CPT | Performed by: INTERNAL MEDICINE

## 2023-11-07 PROCEDURE — 99214 OFFICE O/P EST MOD 30 MIN: CPT | Performed by: INTERNAL MEDICINE

## 2023-11-07 RX ORDER — CETIRIZINE HYDROCHLORIDE 10 MG/1
10 TABLET ORAL DAILY
Status: DISCONTINUED | OUTPATIENT
Start: 2023-11-07 | End: 2023-11-08 | Stop reason: HOSPADM

## 2023-11-07 RX ORDER — OXYMETAZOLINE HYDROCHLORIDE 0.05 G/100ML
2 SPRAY NASAL 2 TIMES DAILY
Qty: 6 ML | Refills: 0 | Status: DISCONTINUED | OUTPATIENT
Start: 2023-11-07 | End: 2023-11-08 | Stop reason: HOSPADM

## 2023-11-07 RX ADMIN — Medication 10 ML: at 08:12

## 2023-11-07 RX ADMIN — NASAL DECONGESTANT 2 SPRAY: 0.05 SPRAY NASAL at 17:41

## 2023-11-07 RX ADMIN — PREDNISONE 20 MG: 20 TABLET ORAL at 08:11

## 2023-11-07 RX ADMIN — INSULIN LISPRO 9 UNITS: 100 INJECTION, SOLUTION INTRAVENOUS; SUBCUTANEOUS at 12:44

## 2023-11-07 RX ADMIN — GABAPENTIN 600 MG: 300 CAPSULE ORAL at 08:12

## 2023-11-07 RX ADMIN — CETIRIZINE HYDROCHLORIDE 10 MG: 10 TABLET, FILM COATED ORAL at 17:44

## 2023-11-07 RX ADMIN — GABAPENTIN 600 MG: 300 CAPSULE ORAL at 17:44

## 2023-11-07 RX ADMIN — ALPRAZOLAM 0.5 MG: 0.5 TABLET ORAL at 20:15

## 2023-11-07 RX ADMIN — INSULIN LISPRO 6 UNITS: 100 INJECTION, SOLUTION INTRAVENOUS; SUBCUTANEOUS at 03:08

## 2023-11-07 RX ADMIN — INSULIN LISPRO 4 UNITS: 100 INJECTION, SOLUTION INTRAVENOUS; SUBCUTANEOUS at 17:43

## 2023-11-07 RX ADMIN — FUROSEMIDE 40 MG: 40 TABLET ORAL at 08:12

## 2023-11-07 RX ADMIN — INSULIN LISPRO 7 UNITS: 100 INJECTION, SOLUTION INTRAVENOUS; SUBCUTANEOUS at 08:12

## 2023-11-07 RX ADMIN — INSULIN GLARGINE 21 UNITS: 100 INJECTION, SOLUTION SUBCUTANEOUS at 20:16

## 2023-11-07 RX ADMIN — DIPHENHYDRAMINE HYDROCHLORIDE 25 MG: 50 INJECTION, SOLUTION INTRAMUSCULAR; INTRAVENOUS at 21:44

## 2023-11-07 RX ADMIN — GABAPENTIN 600 MG: 300 CAPSULE ORAL at 20:15

## 2023-11-07 RX ADMIN — TIZANIDINE 4 MG: 4 TABLET ORAL at 20:16

## 2023-11-07 RX ADMIN — MIRTAZAPINE 15 MG: 15 TABLET, FILM COATED ORAL at 20:16

## 2023-11-07 NOTE — PLAN OF CARE
Goal Outcome Evaluation:                      Pt has had no complaints. Blood sugars gave been high all night. On call provider was called. New orders were started. VSS, call light within reach. Plan of care ongoing.

## 2023-11-07 NOTE — PROGRESS NOTES
Hematology/Oncology Inpatient Progress Note    PATIENT NAME: Nieves Mc  : 1975  MRN: [id number]    CHIEF COMPLAINT: Rash    HISTORY OF PRESENT ILLNESS:      Nieves Mc is a 48 y.o. female who presented to [facility] on 2023 with complaints of rash. PMHx of accelerated phase CML, diabetes mellitus, chronic pain, pulmonary embolism. Patient reported that the rash was nonpainful and developed the morning of her presentation to the ED.  No other associated symptoms. She noted the rash on her right hand, bilateral forearms, left face.  She reported new chemotherapy medication 2 weeks prior-Sprycel, and receiving blood transfusion on 10/31/2023.      23  Hematology/Oncology was consulted on a patient known to us and followed in the office by Dr. Lerma for her diagnosis of CML.  Patient initially presented in  when she was seen in consultation while hospitalized. At that time she was on imatinib.  In  she had progressive thrombocytosis and was transitioned to nilotinib.  After that she was lost to follow-up until she was once again seen during hospitalization in .  She was continued on nilotinib and hydroxyurea.  In 2022 she had once again been lost to follow-up when she presented to the office after not taking nilotinib during that time. She had SOA and cough for which a 2D echo was performed and showed reduced EF.  Due to cardiomyopathy she was transitioned to imatinib and hydroxyurea.  Bone marrow biopsy in 2022 showed her to be in accelerated phase CML and that imatinib was not controlling her malignancy.  She was initiated on ponatinib but required multiple dose reductions due to side effects of bone pain and cytopenias and eventual discontinuation. She was transitioned to dasatinib on 10/10/2023.      She had noted rash on the right upper extremity left upper extremity and a few areas on lower extremity.  She has been on Sprycel for CML.  She denies  any fevers or chills or contact with anybody that has been ill recently.  She did complain of worsening left upper abdominal pain.  She denies any nausea.     He/She  has a past medical history of Bone pain, Chronic constipation (07/07/2023), COVID-19 virus infection (01/12/2022), Diabetes mellitus, Diabetic gastroparesis (07/07/2023), Extremity pain, Fall during current hospitalization (06/25/2023), Leg pain, Migraine, Pubic ramus fracture, left, closed, initial encounter (06/25/2023), Pulmonary embolism, and Vision loss.     PCP: Provider, No Known    Subjective     The right upper extremity rash is less erythematous    ROS:  Review of Systems   Constitutional:  Positive for fatigue. Negative for chills and fever.   HENT:  Negative for congestion, drooling, ear discharge, rhinorrhea, sinus pressure and tinnitus.    Eyes:  Negative for photophobia, pain and discharge.   Respiratory:  Negative for apnea, choking and stridor.    Cardiovascular:  Negative for palpitations.   Gastrointestinal:  Negative for abdominal distention, abdominal pain and anal bleeding.   Endocrine: Negative for polydipsia and polyphagia.   Genitourinary:  Negative for decreased urine volume, flank pain and genital sores.   Musculoskeletal:  Negative for gait problem, neck pain and neck stiffness.   Skin:  Positive for rash. Negative for color change and wound.   Neurological:  Positive for weakness. Negative for tremors, seizures, syncope, facial asymmetry and speech difficulty.   Hematological:  Negative for adenopathy.   Psychiatric/Behavioral:  Negative for agitation, confusion, hallucinations and self-injury. The patient is not hyperactive.         MEDICATIONS:    Scheduled Meds:  budesonide-formoterol, 2 puff, Inhalation, BID - RT  cetirizine, 10 mg, Oral, Daily  furosemide, 40 mg, Oral, Daily  gabapentin, 600 mg, Oral, TID  insulin glargine, 1-200 Units, Subcutaneous, Nightly - Glucommander  insulin lispro, 1-200 Units, Subcutaneous,  "Q4H  mirtazapine, 15 mg, Oral, Nightly  oxymetazoline, 2 spray, Each Nare, BID  predniSONE, 20 mg, Oral, Daily With Breakfast  senna-docusate sodium, 2 tablet, Oral, BID  tiZANidine, 4 mg, Oral, Daily       Continuous Infusions:      PRN Meds:    acetaminophen **OR** acetaminophen **OR** acetaminophen    ALPRAZolam    aluminum-magnesium hydroxide-simethicone    senna-docusate sodium **AND** polyethylene glycol **AND** bisacodyl **AND** bisacodyl    Calcium Replacement - Follow Nurse / BPA Driven Protocol    dextrose    dextrose    glucagon (human recombinant)    hydrOXYzine    insulin lispro    Magnesium Standard Dose Replacement - Follow Nurse / BPA Driven Protocol    melatonin    ondansetron **OR** ondansetron    Phosphorus Replacement - Follow Nurse / BPA Driven Protocol    Potassium Replacement - Follow Nurse / BPA Driven Protocol    [COMPLETED] Insert Peripheral IV **AND** sodium chloride     ALLERGIES:  No Known Allergies    Objective    VITALS:   /84 (BP Location: Right arm, Patient Position: Lying)   Pulse 112   Temp 97.3 °F (36.3 °C) (Oral)   Resp 16   Ht 162.6 cm (64\")   Wt 64.7 kg (142 lb 10.2 oz)   LMP 05/25/2022 (Approximate)   SpO2 95%   BMI 24.48 kg/m²     PHYSICAL EXAM: (performed by MD)  Physical Exam      Right upper extremity involving the forearm wrist  Splenomegaly    RECENT LABS:  Lab Results (last 24 hours)       Procedure Component Value Units Date/Time    POC Glucose Once [[id number]]  (Abnormal) Collected: 11/07/23 1127    Specimen: Blood Updated: 11/07/23 1129     Glucose 350 mg/dL      Comment: Serial Number: [id number]:  [id number]       Phosphorus [[id number]]  (Normal) Collected: 11/07/23 0055    Specimen: Blood Updated: 11/07/23 0956     Phosphorus 4.5 mg/dL     POC Glucose Once [[id number]]  (Abnormal) Collected: 11/07/23 0753    Specimen: Blood Updated: 11/07/23 0755     Glucose 274 mg/dL      Comment: Serial Number: [id number]:  [id number]       Scan Slide " [[id number]] Collected: 11/07/23 0055    Specimen: Blood Updated: 11/07/23 0438     RBC Morphology Normal     WBC Morphology Normal     Platelet Estimate Decreased    CBC Auto Differential [[id number]]  (Abnormal) Collected: 11/07/23 0055    Specimen: Blood Updated: 11/07/23 0438     WBC 4.40 10*3/mm3      RBC 2.70 10*6/mm3      Hemoglobin 7.7 g/dL      Hematocrit 24.7 %      MCV 91.3 fL      MCH 28.4 pg      MCHC 31.1 g/dL      RDW 16.9 %      RDW-SD 57.8 fl      MPV 7.8 fL      Platelets 47 10*3/mm3      Neutrophil % 57.9 %      Lymphocyte % 25.7 %      Monocyte % 15.6 %      Eosinophil % 0.3 %      Basophil % 0.5 %      Neutrophils, Absolute 2.60 10*3/mm3      Lymphocytes, Absolute 1.10 10*3/mm3      Monocytes, Absolute 0.70 10*3/mm3      Eosinophils, Absolute 0.00 10*3/mm3      Basophils, Absolute 0.00 10*3/mm3      nRBC 0.1 /100 WBC     Narrative:      Appended report. These results have been appended to a previously verified report.    POC Glucose Once [[id number]]  (Abnormal) Collected: 11/07/23 0237    Specimen: Blood Updated: 11/07/23 0239     Glucose 438 mg/dL      Comment: Serial Number: [id number]:  [id number]       Magnesium [[id number]]  (Normal) Collected: 11/07/23 0055    Specimen: Blood Updated: 11/07/23 0208     Magnesium 2.2 mg/dL     Basic Metabolic Panel [[id number]]  (Abnormal) Collected: 11/07/23 0055    Specimen: Blood Updated: 11/07/23 0207     Glucose 542 mg/dL      BUN 31 mg/dL      Creatinine 0.92 mg/dL      Sodium 133 mmol/L      Potassium 5.1 mmol/L      Chloride 96 mmol/L      CO2 26.0 mmol/L      Calcium 10.2 mg/dL      BUN/Creatinine Ratio 33.7     Anion Gap 11.0 mmol/L      eGFR 77.0 mL/min/1.73     Narrative:      GFR Normal >60  Chronic Kidney Disease <60  Kidney Failure <15      POC Glucose Once [[id number]]  (Abnormal) Collected: 11/06/23 2226    Specimen: Blood Updated: 11/06/23 2228     Glucose 501 mg/dL      Comment: Serial Number: [id number]:  [id number]       POC  Glucose Once [[id number]]  (Abnormal) Collected: 11/06/23 2114    Specimen: Blood Updated: 11/06/23 2115     Glucose 538 mg/dL      Comment: Serial Number: [id number]:  [id number]               PENDING RESULTS:     IMAGING REVIEWED:  No radiology results for the last day    Assessment & Plan   ASSESSMENT:  Accelerated phase CML, s/p bone marrow biopsy 11/3/2022:  On Sprycel as an outpatient since 10/11/2023 with some difficulty in tolerating due to side effects. On hold since admission due to cytopenias.  Counts remained stable  Thrombocytopenia: 50,000, with rash. No other noted signs of bleeding. Likely secondary to medication.  Sprycel is on hold.  Prednisone 20 mg every morning for 5 days.  We will continue the same  Normocytic Anemia: Hgb 8.0, stable and baseline for patient.  Received 1 unit pRBC 10/31/23 for Hgb of 6.9. Secondary to medication and CML.  Hx. Of PE:  has been on xarelto previously as an outpatient.  Hold anticoagulation for platelet less than 50k  Poorly controlled diabetes type 2: Managed by primary    PLAN:  Monitor CBC and transfuse for hemoglobin less than 7.0 and platelets less than 20,000  Continue to hold Sprycel for now  Continue prednisone 20 mg daily x 5 days  Daily CBCs  Discussed with patient          Electronically signed by Meghann Lerma MD, 11/08/23, 8:22 AM EST.

## 2023-11-07 NOTE — CASE MANAGEMENT/SOCIAL WORK
Social Work Assessment   Mt     Patient Name: Nieves Mc  MRN: [id number]  Today's Date: 11/7/2023    Admit Date: 11/4/2023     Demographic Summary       Row Name 11/07/23 1534       General Information    Reason for Consult housing concerns/homeless    General Information Comments SW was consulted “ resources”.  SW met with pt at bedside in room 309 to inquire further.  Patient reports residing in a hotel in [place], [place]  after being evicted 1.5 years ago 2/2 needing assistance with ADL’s and moving in with her Mother.  She is agreeable to Housing referral.  SW completed referral to [facility] via [facility].  Of note, Patient rec’d for SNF but per MD, patient is refusing SNF at this time.           BLANCHE Ni MSW    Phone: [phone]  Cell: [phone]  Fax: [phone]  [email or web address]

## 2023-11-07 NOTE — CONSULTS
Nutrition Services    Patient Name: Nieves Mc  YOB: 1975  MRN: [id number]  Admission date: 11/4/2023    Continue current diet as tolerated.    NUTRITION SCREENING      Trending Narrative: Pt assessed due to MST score 2+. Pt known to clinical nutrition services from prior admission earlier this year. At that time, pt had lost about 14% body weight, but since then, weight seems to have been trending upward. Pt working with another provider at visit today; will follow up to discuss intakes/weight trends further when pt available. Per review of documented intakes, pt has been eating well at meals so far this admission.        PO Diet: Diet: Diabetic Diets; Consistent Carbohydrate; Texture: Regular Texture (IDDSI 7); Fluid Consistency: Thin (IDDSI 0)   PO Supplements: None ordered   Trending PO Intake:  100% at all recent meals        Nutritionally-Pertinent Medications RDN Reviewed, C/W clinical course         Labs (reviewed below): Reviewed      Results from last 7 days   Lab Units 11/07/23 0055 11/06/23 0340 11/05/23 0938 11/04/23  1517   SODIUM mmol/L 133* 136 137 136   POTASSIUM mmol/L 5.1 4.4 4.4 5.1   CHLORIDE mmol/L 96* 99 101 102   CO2 mmol/L 26.0 26.0 24.0 23.0   BUN mg/dL 31* 25* 19 26*   CREATININE mg/dL 0.92 0.65 0.63 0.69   CALCIUM mg/dL 10.2 11.1* 10.5 10.8*   BILIRUBIN mg/dL  --  1.1  --  1.3*   ALK PHOS U/L  --  441*  --  484*   ALT (SGPT) U/L  --  59*  --  59*   AST (SGOT) U/L  --  41*  --  36*   GLUCOSE mg/dL 542* 131* 409* 304*     Results from last 7 days   Lab Units 11/07/23 0055 11/06/23 0340 11/05/23 0938 11/04/23  1517   MAGNESIUM mg/dL 2.2 1.5*  --  1.7   PHOSPHORUS mg/dL 4.5 5.7*  --  4.0   HEMOGLOBIN g/dL 7.7* 8.0*   < > 8.8*   HEMATOCRIT % 24.7* 24.3*   < > 26.8*    < > = values in this interval not displayed.     Lab Results   Component Value Date    HGBA1C 8.10 (H) 11/04/2023          GI Function:  Stool Output  Stool Unmeasured Occurrence: 1 (11/05/23 1700)  Bowel  "Incontinence: No (11/05/23 1700)  Stool Amount: moderate (11/05/23 1700)          Skin: Intact        Weight Review: Estimated body mass index is 24.48 kg/m² as calculated from the following:    Height as of this encounter: 162.6 cm (64\").    Weight as of this encounter: 64.7 kg (142 lb 10.2 oz).    Comment:   11/7: Scale weight 64.7 kg; had significant weight loss recently -- uncertain of accuracy of this weight; will continue to follow up    Wt Readings from Last 30 Encounters:   11/04/23 1954 64.7 kg (142 lb 10.2 oz)   11/04/23 1351 55.8 kg (123 lb)   09/27/23 1517 55.8 kg (123 lb)   09/11/23 0916 41.7 kg (92 lb)   07/17/23 0849 58.5 kg (129 lb)   07/09/23 0353 52 kg (114 lb 10.2 oz)   07/08/23 0300 51.7 kg (113 lb 15.7 oz)   07/06/23 1548 58.5 kg (129 lb)   06/27/23 0402 57.1 kg (125 lb 14.1 oz)   06/26/23 0322 56.6 kg (124 lb 12.5 oz)   06/24/23 1916 57.1 kg (125 lb 14.1 oz)   06/24/23 1401 58.5 kg (129 lb)   06/14/23 1015 58.5 kg (129 lb)   06/10/23 0639 54.1 kg (119 lb 4.3 oz)   06/07/23 1809 57.6 kg (127 lb)   06/07/23 1250 57.6 kg (127 lb)   06/06/23 1105 57.6 kg (127 lb)   06/02/23 0000 49.4 kg (109 lb)   05/31/23 0517 56.7 kg (125 lb)   05/30/23 0522 57 kg (125 lb 10.6 oz)   05/26/23 0559 57.8 kg (127 lb 6.8 oz)   05/25/23 0542 57.9 kg (127 lb 10.3 oz)   05/24/23 0552 57.6 kg (126 lb 15.8 oz)   05/23/23 0500 56.7 kg (125 lb)   05/22/23 0500 55.4 kg (122 lb 2.2 oz)   05/21/23 0500 56.3 kg (124 lb 1.9 oz)   05/20/23 0540 56 kg (123 lb 7.3 oz)   05/19/23 0500 52.3 kg (115 lb 4.8 oz)   05/17/23 1749 49.9 kg (110 lb)   05/17/23 1105 50 kg (110 lb 3.2 oz)   04/08/23 1141 61.2 kg (134 lb 14.7 oz)   04/06/23 1451 63.4 kg (139 lb 12.8 oz)   03/23/23 1434 71.8 kg (158 lb 6.4 oz)   03/15/23 1530 71.2 kg (157 lb)   03/10/23 1116 70.3 kg (155 lb)   03/02/23 1436 70.9 kg (156 lb 6.4 oz)   02/20/23 1456 65.8 kg (145 lb)   02/13/23 1426 65.6 kg (144 lb 9.6 oz)   02/03/23 1149 68 kg (150 lb)   01/26/23 1425 65 kg (143 lb " 6.4 oz)   01/19/23 1429 64.3 kg (141 lb 12.8 oz)   01/12/23 1444 65 kg (143 lb 6.4 oz)   01/05/23 1109 62.6 kg (138 lb)   11/16/22 0727 68.5 kg (151 lb)   11/16/22 0539 68.6 kg (151 lb 4.8 oz)   11/16/22 0338 71.1 kg (156 lb 12 oz)   11/15/22 0901 68 kg (150 lb)   11/15/22 0328 82 kg (180 lb 12.4 oz)   11/14/22 0349 79 kg (174 lb 2.6 oz)   11/13/22 0418 77.8 kg (171 lb 8.3 oz)   11/12/22 0526 79 kg (174 lb 2.6 oz)   11/12/22 0329 79 kg (174 lb 2.6 oz)   11/11/22 0416 77.1 kg (169 lb 15.6 oz)   11/09/22 1001 62.2 kg (137 lb 3.2 oz)   11/08/22 1100 62.7 kg (138 lb 3 oz)   11/08/22 0422 67 kg (147 lb 11.3 oz)   11/07/22 1100 64.7 kg (142 lb 9.6 oz)   11/03/22 0930 68 kg (150 lb)   10/31/22 1253 68.3 kg (150 lb 9.2 oz)   10/19/22 1028 56.2 kg (123 lb 14.4 oz)   10/12/22 1043 56.2 kg (124 lb)          --       Nutrition Problem Statement: Altered nutrition related laboratory values R/t endocrine D/O; as evidenced by elevated glucose with Hx DM.         Nutrition Intervention: Continue DM-specific diet as ordered.    Monitor weight at least every other day.          Monitoring/Evaluation I&O, PO intake, Pertinent labs, Weight, Skin status, GI status, POC/GOC          RD to follow up per protocol.    Electronically signed by:  Mamta Gomez RD  11/07/23 15:51 EST

## 2023-11-07 NOTE — CASE MANAGEMENT/SOCIAL WORK
Continued Stay Note  [facility]     Patient Name: Nieves Mc  MRN: [id number]  Today's Date: 11/7/2023    Admit Date: 11/4/2023    Plan: DC PLAN: Routine home. Current with [facility]. and [facility] MWF (8-4) May need transport at DC.       Discharge Plan       Row Name 11/07/23 1414       Plan    Plan DC PLAN: Routine home. Current with [facility]. and [facility] MWF (8-4) May need transport at DC.    Plan Comments DC BARRIERS: Elevated Blood sugar at 500.                      Expected Discharge Date and Time       Expected Discharge Date Expected Discharge Time    Nov 8, 2023           Sharon Lovett RN

## 2023-11-07 NOTE — PROGRESS NOTES
Hospitalist Progress Note   Nieves Mc : 1975 MRN:[id number] LOS:0     Principal Problem: Thrombocytopenia     Reason for follow up: All the medical problems listed below    Summary     Nieves Mc is a 48 y.o. female who presented to [facility] on 2023 complaining of Non-pain painful rash that has developed over her right hand and distal forearm, also started noticing it slightly on her left face and left forearm.  She reports that she woke up this morning and noticed the rash for the first time.  No other associated symptoms.  She has CML and is a patient of Dr. Lerma. She started on a new chemo medication 2 weeks ago called Sprycel.  She had an outpatient PRBC transfusion on 10/31/2023.      Assessment / Plan     #Petechial rash 2/2 Worsening thrombocytopenia from ? ITP or chemo side effect   -presented with skin rash on bilateral UE ( R > L ) and also started on thighs   -recent bld transfusion on 10/31/23 for anemia  -chart review with thrombocytopenia in May 2023.   -Transfuse for hemoglobin less than 7, platelets less than 20  -Hematology following   -Holding Sprycel for now       #CML   -has been on chemo for 5 years and recent change to Sprycel   -monitor CBC daily     #DM   -SSI glargine edd lispro   -Started Glucomander overnight for significant jump in her blood glucose levels  -We will likely need to adjust her insulin dosing at discharge    #left ear fullness  -Sounds like eustachian tube dysfunction  -Did not have an otoscope during rounds today, but we will trial Afrin and Zyrtec and see if this helps  -If it is still problematic tomorrow morning we will begin up an otoscope and take a look    Disposition: home, hopefully tomorrow once her glucose is under better control on steroids     Subjective / Review of systems     Did okay overnight.  Denies any significant pain currently.  Still has the rash on her right arm as well as some stiffness in her hands.  Electrolytes  are looking better after replacement.  Dose was up to 542 this morning after starting steroids yesterday unfortunately.  She had been getting a reasonable amount of insulin in the interim.  She was started on Glucomander overnight.  She expressed a little frustration because she feels like her platelet count is low enough to be transfused, but the oncology team does not agree.  Platelet count is at 47,000 today.  She complains of some intermittent left ear fullness that clears with external preauricular pressure.  Denies any significant chronic allergy symptoms and is not on allergy medication at baseline.  She does have a little bit of sinus congestion however.  Discussed the case with the bedside nursing staff. The patient's case was also discussed with the pharmacy and case management providers at multidisciplinary rounds. No other acute concerns.    Objective / Physical Exam   Vital signs:  Temp: 98.1 °F (36.7 °C)  BP: 126/88  Heart Rate: 119  Resp: 16  SpO2: 100 %  Weight: 64.7 kg (142 lb 10.2 oz)    Admission Weight: Weight: 55.8 kg (123 lb)  Current Weight: Weight: 64.7 kg (142 lb 10.2 oz)    Input/Output in last 24 hours:    Intake/Output Summary (Last 24 hours) at 11/7/2023 0839  Last data filed at 11/6/2023 1735  Gross per 24 hour   Intake 220 ml   Output --   Net 220 ml      Physical Exam   HENT:      Head: Normocephalic and atraumatic.      Nose: Nose normal.   Eyes:      Extraocular Movements: Extraocular movements intact.      Conjunctivae/sclera: Conjunctivae normal.      Pupils: Pupils are equal, round, and reactive to light.   Cardiovascular:      Rate and Rhythm: normal       Pulses: Normal pulses.      Heart sounds: Normal heart sounds.   Pulmonary:      Effort: normal      Breath sounds: normal   Abdominal:      General: Abdomen is flat. Bowel sounds are normal.      Palpations: Abdomen is soft.   Musculoskeletal:         General: Normal range of motion.      Cervical back: Normal range of motion  and neck supple.   Skin:     Petechia rash in both UE and thighs     Radiology and Labs     Results from last 7 days   Lab Units 11/07/23  0055 11/06/23  0340 11/05/23  0938 11/04/23  1517 10/31/23  2204   WBC 10*3/mm3 4.40 4.90 4.00 6.10  --    HEMATOCRIT % 24.7* 24.3* 23.6* 26.8* 25.4*   PLATELETS 10*3/mm3 47* 50* 43* 50*  --       Results from last 7 days   Lab Units 11/07/23  0055 11/06/23  0340 11/05/23  0938 11/04/23  1517   SODIUM mmol/L 133* 136 137 136   POTASSIUM mmol/L 5.1 4.4 4.4 5.1   CHLORIDE mmol/L 96* 99 101 102   CO2 mmol/L 26.0 26.0 24.0 23.0   BUN mg/dL 31* 25* 19 26*   CREATININE mg/dL 0.92 0.65 0.63 0.69      Current medications   Scheduled Meds:    budesonide-formoterol, 2 puff, Inhalation, BID - RT  furosemide, 40 mg, Oral, Daily  gabapentin, 600 mg, Oral, TID  insulin glargine, 1-200 Units, Subcutaneous, Nightly - Glucommander  insulin lispro, 1-200 Units, Subcutaneous, Q4H  mirtazapine, 15 mg, Oral, Nightly  predniSONE, 20 mg, Oral, Daily With Breakfast  senna-docusate sodium, 2 tablet, Oral, BID  tiZANidine, 4 mg, Oral, Daily      Yuniel Yancey MD   [facility]  11/07/23   08:39 EST

## 2023-11-07 NOTE — PLAN OF CARE
Goal Outcome Evaluation:  Pt. Has had no complaints today. VSS. Call light within reach. Hoping to discharge tomorrow.

## 2023-11-08 ENCOUNTER — READMISSION MANAGEMENT (OUTPATIENT)
Dept: CALL CENTER | Facility: HOSPITAL | Age: 48
End: 2023-11-08
Payer: MEDICAID

## 2023-11-08 VITALS
HEART RATE: 111 BPM | OXYGEN SATURATION: 92 % | DIASTOLIC BLOOD PRESSURE: 76 MMHG | RESPIRATION RATE: 14 BRPM | TEMPERATURE: 99.2 F | HEIGHT: 64 IN | BODY MASS INDEX: 24.35 KG/M2 | SYSTOLIC BLOOD PRESSURE: 118 MMHG | WEIGHT: 142.64 LBS

## 2023-11-08 PROBLEM — T45.1X5A ADVERSE EFFECT OF CHEMOTHERAPY: Status: ACTIVE | Noted: 2023-11-08

## 2023-11-08 PROBLEM — R23.3 PETECHIAL RASH: Chronic | Status: ACTIVE | Noted: 2023-11-08

## 2023-11-08 LAB
ANION GAP SERPL CALCULATED.3IONS-SCNC: 11 MMOL/L (ref 5–15)
BASOPHILS # BLD AUTO: 0 10*3/MM3 (ref 0–0.2)
BASOPHILS NFR BLD AUTO: 0.8 % (ref 0–1.5)
BUN SERPL-MCNC: 35 MG/DL (ref 6–20)
BUN/CREAT SERPL: 44.9 (ref 7–25)
CALCIUM SPEC-SCNC: 10.5 MG/DL (ref 8.6–10.5)
CHLORIDE SERPL-SCNC: 101 MMOL/L (ref 98–107)
CO2 SERPL-SCNC: 27 MMOL/L (ref 22–29)
CREAT SERPL-MCNC: 0.78 MG/DL (ref 0.57–1)
DEPRECATED RDW RBC AUTO: 50.3 FL (ref 37–54)
EGFRCR SERPLBLD CKD-EPI 2021: 93.8 ML/MIN/1.73
EOSINOPHIL # BLD AUTO: 0 10*3/MM3 (ref 0–0.4)
EOSINOPHIL NFR BLD AUTO: 0.7 % (ref 0.3–6.2)
ERYTHROCYTE [DISTWIDTH] IN BLOOD BY AUTOMATED COUNT: 16.2 % (ref 12.3–15.4)
GLUCOSE BLDC GLUCOMTR-MCNC: 148 MG/DL (ref 70–105)
GLUCOSE BLDC GLUCOMTR-MCNC: 183 MG/DL (ref 70–105)
GLUCOSE BLDC GLUCOMTR-MCNC: 195 MG/DL (ref 70–105)
GLUCOSE BLDC GLUCOMTR-MCNC: 264 MG/DL (ref 70–105)
GLUCOSE BLDC GLUCOMTR-MCNC: 543 MG/DL (ref 70–105)
GLUCOSE SERPL-MCNC: 87 MG/DL (ref 65–99)
HCT VFR BLD AUTO: 23.5 % (ref 34–46.6)
HGB BLD-MCNC: 7.8 G/DL (ref 12–15.9)
LYMPHOCYTES # BLD AUTO: 2.1 10*3/MM3 (ref 0.7–3.1)
LYMPHOCYTES NFR BLD AUTO: 34.8 % (ref 19.6–45.3)
MCH RBC QN AUTO: 29.4 PG (ref 26.6–33)
MCHC RBC AUTO-ENTMCNC: 33.2 G/DL (ref 31.5–35.7)
MCV RBC AUTO: 88.6 FL (ref 79–97)
MONOCYTES # BLD AUTO: 0.7 10*3/MM3 (ref 0.1–0.9)
MONOCYTES NFR BLD AUTO: 10.9 % (ref 5–12)
NEUTROPHILS NFR BLD AUTO: 3.2 10*3/MM3 (ref 1.7–7)
NEUTROPHILS NFR BLD AUTO: 52.8 % (ref 42.7–76)
NRBC BLD AUTO-RTO: 0 /100 WBC (ref 0–0.2)
PLATELET # BLD AUTO: 54 10*3/MM3 (ref 140–450)
PMV BLD AUTO: 8.1 FL (ref 6–12)
POTASSIUM SERPL-SCNC: 4 MMOL/L (ref 3.5–5.2)
RBC # BLD AUTO: 2.65 10*6/MM3 (ref 3.77–5.28)
SODIUM SERPL-SCNC: 139 MMOL/L (ref 136–145)
WBC NRBC COR # BLD: 6 10*3/MM3 (ref 3.4–10.8)

## 2023-11-08 PROCEDURE — 82948 REAGENT STRIP/BLOOD GLUCOSE: CPT

## 2023-11-08 PROCEDURE — 63710000001 INSULIN LISPRO (HUMAN) PER 5 UNITS: Performed by: NURSE PRACTITIONER

## 2023-11-08 PROCEDURE — G0378 HOSPITAL OBSERVATION PER HR: HCPCS

## 2023-11-08 PROCEDURE — 25010000002 DIPHENHYDRAMINE PER 50 MG: Performed by: FAMILY MEDICINE

## 2023-11-08 PROCEDURE — 99214 OFFICE O/P EST MOD 30 MIN: CPT | Performed by: INTERNAL MEDICINE

## 2023-11-08 PROCEDURE — 80048 BASIC METABOLIC PNL TOTAL CA: CPT | Performed by: FAMILY MEDICINE

## 2023-11-08 PROCEDURE — 96376 TX/PRO/DX INJ SAME DRUG ADON: CPT

## 2023-11-08 PROCEDURE — 85025 COMPLETE CBC W/AUTO DIFF WBC: CPT | Performed by: FAMILY MEDICINE

## 2023-11-08 PROCEDURE — 97530 THERAPEUTIC ACTIVITIES: CPT

## 2023-11-08 PROCEDURE — 63710000001 PREDNISONE PER 1 MG: Performed by: INTERNAL MEDICINE

## 2023-11-08 RX ORDER — PREDNISONE 20 MG/1
20 TABLET ORAL
Qty: 3 TABLET | Refills: 0 | Status: SHIPPED | OUTPATIENT
Start: 2023-11-09

## 2023-11-08 RX ORDER — PEN NEEDLE, DIABETIC 30 GX3/16"
1 NEEDLE, DISPOSABLE MISCELLANEOUS
Qty: 200 EACH | Refills: 2 | Status: SHIPPED | OUTPATIENT
Start: 2023-11-08

## 2023-11-08 RX ORDER — CETIRIZINE HYDROCHLORIDE 10 MG/1
10 TABLET ORAL DAILY
Qty: 30 TABLET | Refills: 0 | Status: SHIPPED | OUTPATIENT
Start: 2023-11-09

## 2023-11-08 RX ORDER — INSULIN LISPRO 100 [IU]/ML
7 INJECTION, SOLUTION INTRAVENOUS; SUBCUTANEOUS
Qty: 15 ML | Refills: 2 | Status: SHIPPED | OUTPATIENT
Start: 2023-11-08

## 2023-11-08 RX ORDER — OXYMETAZOLINE HYDROCHLORIDE 0.05 G/100ML
2 SPRAY NASAL 2 TIMES DAILY
Start: 2023-11-08

## 2023-11-08 RX ORDER — DAPAGLIFLOZIN 10 MG/1
10 TABLET, FILM COATED ORAL DAILY
Qty: 30 TABLET | Refills: 2 | Status: SHIPPED | OUTPATIENT
Start: 2023-11-08

## 2023-11-08 RX ADMIN — DIPHENHYDRAMINE HYDROCHLORIDE 25 MG: 50 INJECTION, SOLUTION INTRAMUSCULAR; INTRAVENOUS at 05:32

## 2023-11-08 RX ADMIN — FUROSEMIDE 40 MG: 40 TABLET ORAL at 08:44

## 2023-11-08 RX ADMIN — INSULIN LISPRO 12 UNITS: 100 INJECTION, SOLUTION INTRAVENOUS; SUBCUTANEOUS at 00:33

## 2023-11-08 RX ADMIN — INSULIN LISPRO 7 UNITS: 100 INJECTION, SOLUTION INTRAVENOUS; SUBCUTANEOUS at 08:45

## 2023-11-08 RX ADMIN — INSULIN LISPRO 1 UNITS: 100 INJECTION, SOLUTION INTRAVENOUS; SUBCUTANEOUS at 04:00

## 2023-11-08 RX ADMIN — DIPHENHYDRAMINE HYDROCHLORIDE 25 MG: 50 INJECTION, SOLUTION INTRAMUSCULAR; INTRAVENOUS at 12:44

## 2023-11-08 RX ADMIN — PREDNISONE 20 MG: 20 TABLET ORAL at 08:44

## 2023-11-08 RX ADMIN — INSULIN LISPRO 10 UNITS: 100 INJECTION, SOLUTION INTRAVENOUS; SUBCUTANEOUS at 13:01

## 2023-11-08 RX ADMIN — GABAPENTIN 600 MG: 300 CAPSULE ORAL at 08:44

## 2023-11-08 RX ADMIN — NASAL DECONGESTANT 2 SPRAY: 0.05 SPRAY NASAL at 08:46

## 2023-11-08 RX ADMIN — CETIRIZINE HYDROCHLORIDE 10 MG: 10 TABLET, FILM COATED ORAL at 08:44

## 2023-11-08 NOTE — PLAN OF CARE
Goal Outcome Evaluation:                      Pt had no complaints. Pt BS have been high. Coverage is being given every 4 hours. Vss, call light within reach. Plan of care ongoing.

## 2023-11-08 NOTE — CASE MANAGEMENT/SOCIAL WORK
Continued Stay Note   [facility]     Patient Name: Nieves Mc  MRN: [id number]  Today's Date: 11/8/2023    Admit Date: 11/4/2023    Plan: Home. Current with [facility] MWF (8-4).   Discharge Plan       Row Name 11/08/23 1230       Plan    Plan Home. Current with [facility] MWF (8-4).    Plan Comments PT recommends SNF, PT refuses SNF and has [facility] and [facility] (M-F) 8-4 and denies any additional needs. RIA provided 11/4 per registration.           Ángela Bell RN    phone [phone]  fax [phone]

## 2023-11-08 NOTE — PLAN OF CARE
"Assessment: Hope NICHOLAS Mc presents with functional mobility impairments which indicate the need for skilled intervention. ue to safety concerns / pt's non-compliance with safety measures, PT did not attempt standing / transfer this date. Will continue to follow and progress as tolerated.     Plan/Recommendations:   Moderate Intensity Therapy recommended post-acute care. This is recommended as therapy feels the patient would require 3-4 days per week and wouldn't tolerate \"3 hour daily\" rehab intensity. SNF would be the preferred choice. If the patient does not agree to SNF, arrange HH or OP depending on home bound status. If patient is medically complex, consider LTACH.. Pt requires no DME at discharge (owns w/c, rollator, and Rwx).     Pt desires Home with family assist at discharge. Pt cooperative; agreeable to therapeutic recommendations and plan of care.                         "

## 2023-11-08 NOTE — THERAPY TREATMENT NOTE
"Subjective: Pt agreeable to therapeutic plan of care.    Objective:     Bed mobility - CGA with UE support to manage Les;  Transfers - N/A or Not attempted. - pt declines use of non-skid socks and gait belt and reports she will not use Rwx, will only use bedside table. Due to safety concerns / pt's non-compliance with safety measures, PT did not attempt standing / transfer this date.   Ambulation -  N/A    Vitals: WNL    Pain: 3 VAS   Location: back/LE with mobility  Intervention for pain: Repositioned    Education: Provided education on the importance of mobility in the acute care setting, Verbal/Tactile Cues, and ADL training    Assessment: Hope A Jesusita presents with functional mobility impairments which indicate the need for skilled intervention. ue to safety concerns / pt's non-compliance with safety measures, PT did not attempt standing / transfer this date. Will continue to follow and progress as tolerated.     Plan/Recommendations:   Moderate Intensity Therapy recommended post-acute care. This is recommended as therapy feels the patient would require 3-4 days per week and wouldn't tolerate \"3 hour daily\" rehab intensity. SNF would be the preferred choice. If the patient does not agree to SNF, arrange HH or OP depending on home bound status. If patient is medically complex, consider LTACH.. Pt requires no DME at discharge (owns w/c, rollator, and Rwx).     Pt desires Home with family assist at discharge. Pt cooperative; agreeable to therapeutic recommendations and plan of care.       Post-Tx Position: Supine with HOB Elevated, Alarms activated, and Call light and personal items within reach  PPE: gloves, surgical mask, and gown   "

## 2023-11-08 NOTE — PROGRESS NOTES
Hematology/Oncology Inpatient Progress Note    PATIENT NAME: Nieves Mc  : 1975  MRN: [id number]    CHIEF COMPLAINT: Rash    HISTORY OF PRESENT ILLNESS:      Nieves Mc is a 48 y.o. female who presented to [facility] on 2023 with complaints of rash. PMHx of accelerated phase CML, diabetes mellitus, chronic pain, pulmonary embolism. Patient reported that the rash was nonpainful and developed the morning of her presentation to the ED.  No other associated symptoms. She noted the rash on her right hand, bilateral forearms, left face.  She reported new chemotherapy medication 2 weeks prior-Sprycel, and receiving blood transfusion on 10/31/2023.      23  Hematology/Oncology was consulted on a patient known to us and followed in the office by Dr. Lerma for her diagnosis of CML.  Patient initially presented in  when she was seen in consultation while hospitalized. At that time she was on imatinib.  In  she had progressive thrombocytosis and was transitioned to nilotinib.  After that she was lost to follow-up until she was once again seen during hospitalization in .  She was continued on nilotinib and hydroxyurea.  In 2022 she had once again been lost to follow-up when she presented to the office after not taking nilotinib during that time. She had SOA and cough for which a 2D echo was performed and showed reduced EF.  Due to cardiomyopathy she was transitioned to imatinib and hydroxyurea.  Bone marrow biopsy in 2022 showed her to be in accelerated phase CML and that imatinib was not controlling her malignancy.  She was initiated on ponatinib but required multiple dose reductions due to side effects of bone pain and cytopenias and eventual discontinuation. She was transitioned to dasatinib on 10/10/2023.      She had noted rash on the right upper extremity left upper extremity and a few areas on lower extremity.  She has been on Sprycel for CML.  She denies  any fevers or chills or contact with anybody that has been ill recently.  She did complain of worsening left upper abdominal pain.  She denies any nausea.     He/She  has a past medical history of Bone pain, Chronic constipation (07/07/2023), COVID-19 virus infection (01/12/2022), Diabetes mellitus, Diabetic gastroparesis (07/07/2023), Extremity pain, Fall during current hospitalization (06/25/2023), Leg pain, Migraine, Pubic ramus fracture, left, closed, initial encounter (06/25/2023), Pulmonary embolism, and Vision loss.     PCP: Provider, No Known    Subjective     Patient has no new issues today    ROS:  Review of Systems   Constitutional:  Positive for fatigue. Negative for chills and fever.   HENT:  Negative for congestion, drooling, ear discharge, rhinorrhea, sinus pressure and tinnitus.    Eyes:  Negative for photophobia, pain and discharge.   Respiratory:  Negative for apnea, choking and stridor.    Cardiovascular:  Negative for palpitations.   Gastrointestinal:  Negative for abdominal distention, abdominal pain and anal bleeding.   Endocrine: Negative for polydipsia and polyphagia.   Genitourinary:  Negative for decreased urine volume, flank pain and genital sores.   Musculoskeletal:  Negative for gait problem, neck pain and neck stiffness.   Skin:  Positive for rash. Negative for color change and wound.   Neurological:  Positive for weakness. Negative for tremors, seizures, syncope, facial asymmetry and speech difficulty.   Hematological:  Negative for adenopathy.   Psychiatric/Behavioral:  Negative for agitation, confusion, hallucinations and self-injury. The patient is not hyperactive.         MEDICATIONS:    Scheduled Meds:  budesonide-formoterol, 2 puff, Inhalation, BID - RT  cetirizine, 10 mg, Oral, Daily  furosemide, 40 mg, Oral, Daily  gabapentin, 600 mg, Oral, TID  insulin glargine, 1-200 Units, Subcutaneous, Nightly - Glucommander  insulin lispro, 1-200 Units, Subcutaneous, Q4H  mirtazapine, 15  "mg, Oral, Nightly  oxymetazoline, 2 spray, Each Nare, BID  predniSONE, 20 mg, Oral, Daily With Breakfast  senna-docusate sodium, 2 tablet, Oral, BID  tiZANidine, 4 mg, Oral, Daily       Continuous Infusions:      PRN Meds:    acetaminophen **OR** acetaminophen **OR** acetaminophen    ALPRAZolam    aluminum-magnesium hydroxide-simethicone    senna-docusate sodium **AND** polyethylene glycol **AND** bisacodyl **AND** bisacodyl    Calcium Replacement - Follow Nurse / BPA Driven Protocol    dextrose    dextrose    diphenhydrAMINE (BENADRYL) 25 mg in sodium chloride 0.9 % 50 mL IVPB    glucagon (human recombinant)    insulin lispro    Magnesium Standard Dose Replacement - Follow Nurse / BPA Driven Protocol    melatonin    ondansetron **OR** ondansetron    Phosphorus Replacement - Follow Nurse / BPA Driven Protocol    Potassium Replacement - Follow Nurse / BPA Driven Protocol    [COMPLETED] Insert Peripheral IV **AND** sodium chloride     ALLERGIES:  No Known Allergies    Objective    VITALS:   /76 (BP Location: Right arm, Patient Position: Lying)   Pulse 111   Temp 99.2 °F (37.3 °C) (Oral)   Resp 14   Ht 162.6 cm (64\")   Wt 64.7 kg (142 lb 10.2 oz)   LMP 05/25/2022 (Approximate)   SpO2 92%   BMI 24.48 kg/m²     PHYSICAL EXAM: (performed by MD)  Physical Exam      Right upper extremity involving the forearm wrist  Splenomegaly    I have reexamined the patient and the results are consistent with the previously documented exam. Meghann Lerma MD      RECENT LABS:    Lab Results (last 24 hours)       Procedure Component Value Units Date/Time    Basic Metabolic Panel [[id number]]  (Abnormal) Collected: 11/08/23 1010    Specimen: Blood Updated: 11/08/23 1121     Glucose 87 mg/dL      BUN 35 mg/dL      Creatinine 0.78 mg/dL      Sodium 139 mmol/L      Potassium 4.0 mmol/L      Chloride 101 mmol/L      CO2 27.0 mmol/L      Calcium 10.5 mg/dL      BUN/Creatinine Ratio 44.9     Anion Gap 11.0 mmol/L      eGFR " 93.8 mL/min/1.73     Narrative:      GFR Normal >60  Chronic Kidney Disease <60  Kidney Failure <15      CBC & Differential [[id number]]  (Abnormal) Collected: 11/08/23 1010    Specimen: Blood Updated: 11/08/23 1039    Narrative:      The following orders were created for panel order CBC & Differential.  Procedure                               Abnormality         Status                     ---------                               -----------         ------                     CBC Auto Differential[[id number]]        Abnormal            Final result                 Please view results for these tests on the individual orders.    CBC Auto Differential [[id number]]  (Abnormal) Collected: 11/08/23 1010    Specimen: Blood Updated: 11/08/23 1039     WBC 6.00 10*3/mm3      RBC 2.65 10*6/mm3      Hemoglobin 7.8 g/dL      Hematocrit 23.5 %      MCV 88.6 fL      MCH 29.4 pg      MCHC 33.2 g/dL      Comment: Result checked          RDW 16.2 %      RDW-SD 50.3 fl      MPV 8.1 fL      Platelets 54 10*3/mm3      Neutrophil % 52.8 %      Lymphocyte % 34.8 %      Monocyte % 10.9 %      Eosinophil % 0.7 %      Basophil % 0.8 %      Neutrophils, Absolute 3.20 10*3/mm3      Lymphocytes, Absolute 2.10 10*3/mm3      Monocytes, Absolute 0.70 10*3/mm3      Eosinophils, Absolute 0.00 10*3/mm3      Basophils, Absolute 0.00 10*3/mm3      nRBC 0.0 /100 WBC     POC Glucose Once [[id number]]  (Abnormal) Collected: 11/08/23 0736    Specimen: Blood Updated: 11/08/23 0738     Glucose 148 mg/dL      Comment: Serial Number: [id number]:  [id number]       POC Glucose Once [[id number]]  (Abnormal) Collected: 11/08/23 0353    Specimen: Blood Updated: 11/08/23 0355     Glucose 195 mg/dL      Comment: Serial Number: [id number]:  [id number]       POC Glucose Once [[id number]]  (Abnormal) Collected: 11/08/23 0352    Specimen: Blood Updated: 11/08/23 0355     Glucose 183 mg/dL      Comment: Serial Number: [id number]:  [id number]       POC Glucose  Once [[id number]]  (Abnormal) Collected: 11/08/23 0027    Specimen: Blood Updated: 11/08/23 0029     Glucose 543 mg/dL      Comment: Serial Number: [id number]:  [id number]       POC Glucose Once [[id number]]  (Abnormal) Collected: 11/07/23 2009    Specimen: Blood Updated: 11/07/23 2010     Glucose 470 mg/dL      Comment: Serial Number: [id number]:  [id number]       POC Glucose Once [[id number]]  (Abnormal) Collected: 11/07/23 1803    Specimen: Blood Updated: 11/07/23 1805     Glucose 476 mg/dL      Comment: Serial Number: [id number]:  [id number]               PENDING RESULTS:     IMAGING REVIEWED:  No radiology results for the last day    Assessment & Plan   ASSESSMENT:  Accelerated phase CML, s/p bone marrow biopsy 11/3/2022:  On Sprycel as an outpatient since 10/11/2023 with some difficulty in tolerating due to side effects. On hold since admission due to cytopenias.  Counts are stable  Thrombocytopenia: 50,000, with rash. No other noted signs of bleeding. Likely secondary to medication.  Sprycel is on hold.  Prednisone 20 mg every morning for 5 days.  We will continue the same  Normocytic Anemia: Hgb 8.0, stable and baseline for patient.  Received 1 unit pRBC 10/31/23 for Hgb of 6.9. Secondary to medication and CML.  Hx. Of PE:  has been on xarelto previously as an outpatient.  Hold anticoagulation for platelet less than 50k  Poorly controlled diabetes type 2: Managed by primary    PLAN:  Monitor CBC and transfuse for hemoglobin less than 7.0 and platelets less than 20,000  Continue to hold Sprycel for now  Continue prednisone 20 mg p.o. daily for 5 total doses  Follow-up in the office after discharge  Continue to hold Sprycel  Daily CBCs  Discussed with patient            Electronically signed by Meghann Lerma MD, 11/10/23, 8:30 AM EST.        shortness of breath

## 2023-11-08 NOTE — DISCHARGE SUMMARY
[facility]  Discharge Summary    Date of Service: 2023  Patient Name: Nieves Mc  : 1975  MRN: [id number]    Date of Admission: 2023  Discharge Diagnosis: See below  Date of Discharge: 23  Primary Care Physician: Provider, No Known    Presenting Problem:   Thrombocytopenia [D69.6]  Petechial rash [R23.3]    Active and Resolved Hospital Problems:  Active Hospital Problems    Diagnosis POA    **Adverse effect of chemotherapy [T45.1X5A] Yes    Petechial rash [R23.3] Yes    Diabetic gastroparesis [E11.43, K31.84] Yes    Chronic constipation [K59.09] Yes    Frequent falls [R29.6] Not Applicable    History of migraine [Z86.69] Not Applicable    Lumbar radiculopathy [M54.16] Yes    Chronic narcotic dependence [F11.20] Yes    Thrombocytopenia [D69.6] Yes    Moderate malnutrition [E44.0] Yes    Type 2 diabetes mellitus with diabetic polyneuropathy, with long-term current use of insulin [E11.42, Z79.4] Not Applicable    Depression with anxiety [F41.8] Yes    History of pulmonary embolism [Z86.711] Yes    Medically noncompliant [Z91.199] Not Applicable    CML (chronic myelocytic leukemia) [C92.10] Yes      Resolved Hospital Problems   No resolved problems to display.       Hospital Course     HPI:  Nieves Mc is a 48 y.o. female who presented to [facility] on 2023 complaining of  Non-pain painful rash that has developed over her right hand and distal forearm, also started noticing it slightly on her left face and left forearm.  She reports that she woke up this morning and noticed the rash for the first time.  No other associated symptoms.  She has CML and is a patient of Dr. Lerma. She started on a new chemo medication 2 weeks ago called Sprycel.  She had an outpatient PRBC transfusion on 10/31/2023.      Hospital course:  She was admitted in consultation with hematology for further evaluation and treatment.  No further needs for blood or  platelet transfusions.  Thought that this was likely a rash secondary to her new chemotherapy.  Her chronic pain was reasonably managed.  She had some electrolyte replacement during her stay.  Her glucose was quite elevated, in part because of steroids but also due to dietary indiscretion.  This extended her hospital course by at least 1 day, but this has improved.  She did see the diabetic educator during her hospital stay.  She had some sinus congestion and left ear fullness that was most consistent with eustachian tube dysfunction, and she was started on allergy medications in order to see if this helps. Sprycel will continue to be held, and she will follow-up closely with oncology in the outpatient setting.  Insulin changes were made as described below.  She is being discharged on oral prednisone.  She was encouraged to monitor her blood sugar closely to make sure that these insulin changes were not too much once she was off the prednisone.  She will discharge home today.    Day of Discharge     Vital Signs:  Temp:  [97.3 °F (36.3 °C)-99.2 °F (37.3 °C)] 99.2 °F (37.3 °C)  Heart Rate:  [107-115] 111  Resp:  [14-18] 14  BP: (105-130)/(70-86) 118/76    Physical Exam:  HENT:      Head: Normocephalic and atraumatic.      Nose: Nose normal.   Eyes:      Extraocular Movements: Extraocular movements intact.      Conjunctivae/sclera: Conjunctivae normal.      Pupils: Pupils are equal, round, and reactive to light.   Cardiovascular:      Rate and Rhythm: normal       Pulses: Normal pulses.      Heart sounds: Normal heart sounds.   Pulmonary:      Effort: normal      Breath sounds: normal   Abdominal:      General: Abdomen is flat. Bowel sounds are normal.      Palpations: Abdomen is soft.   Musculoskeletal:         General: Normal range of motion.      Cervical back: Normal range of motion and neck supple.   Skin:     Petechia rash in both UE and thighs      Pertinent  and/or Most Recent Results     LAB RESULTS:      Lab  11/08/23  1010 11/07/23  0055 11/06/23  0340 11/05/23  0938 11/04/23  1517   WBC 6.00 4.40 4.90 4.00 6.10   HEMOGLOBIN 7.8* 7.7* 8.0* 7.7* 8.8*   HEMATOCRIT 23.5* 24.7* 24.3* 23.6* 26.8*   PLATELETS 54* 47* 50* 43* 50*   NEUTROS ABS 3.20 2.60 2.10 2.10 2.70   LYMPHS ABS 2.10 1.10 1.90 1.30 2.70   MONOS ABS 0.70 0.70 0.80 0.50 0.60   EOS ABS 0.00 0.00 0.10 0.10 0.10   MCV 88.6 91.3 88.4 89.8 88.9   SED RATE  --   --   --   --  97*   CRP  --   --   --   --  <0.30   PROTIME  --   --   --   --  10.0   APTT  --   --   --   --  24.2         Lab 11/08/23  1010 11/07/23  0055 11/06/23  0340 11/05/23  0938 11/04/23  1517   SODIUM 139 133* 136 137 136   POTASSIUM 4.0 5.1 4.4 4.4 5.1   CHLORIDE 101 96* 99 101 102   CO2 27.0 26.0 26.0 24.0 23.0   ANION GAP 11.0 11.0 11.0 12.0 11.0   BUN 35* 31* 25* 19 26*   CREATININE 0.78 0.92 0.65 0.63 0.69   EGFR 93.8 77.0 108.8 109.6 107.2   GLUCOSE 87 542* 131* 409* 304*   CALCIUM 10.5 10.2 11.1* 10.5 10.8*   MAGNESIUM  --  2.2 1.5*  --  1.7   PHOSPHORUS  --  4.5 5.7*  --  4.0   HEMOGLOBIN A1C  --   --   --   --  8.10*         Lab 11/06/23  0340 11/04/23  1517   TOTAL PROTEIN 7.1 7.3   ALBUMIN 3.8 4.1   GLOBULIN 3.3 3.2   ALT (SGPT) 59* 59*   AST (SGOT) 41* 36*   BILIRUBIN 1.1 1.3*   ALK PHOS 441* 484*         Lab 11/04/23  1517   PROTIME 10.0   INR <0.93*             Lab 11/05/23  0801   ABO TYPING A   RH TYPING Positive   ANTIBODY SCREEN Positive         Brief Urine Lab Results  (Last result in the past 365 days)        Color   Clarity   Blood   Leuk Est   Nitrite   Protein   CREAT   Urine HCG        07/06/23 2014 Dark Yellow   Cloudy   Negative   Moderate (2+)   Negative   >=300 mg/dL (3+)                 Microbiology Results (last 10 days)       ** No results found for the last 240 hours. **                 Results for orders placed during the hospital encounter of 11/03/22    Duplex Venous Lower Extremity - Bilateral CAR    Interpretation Summary    Chronic right lower extremity  superficial thrombophlebitis noted in the small saphenous.    All other veins appeared normal bilaterally.      Results for orders placed during the hospital encounter of 11/03/22    Duplex Venous Lower Extremity - Bilateral CAR    Interpretation Summary    Chronic right lower extremity superficial thrombophlebitis noted in the small saphenous.    All other veins appeared normal bilaterally.      Results for orders placed during the hospital encounter of 03/10/23    Adult Transthoracic Echo Complete W/ Cont if Necessary Per Protocol    Interpretation Summary    Left ventricular systolic function is mildly decreased. Calculated left ventricular EF = 44%    The left ventricular cavity is moderately dilated.    Left ventricular diastolic function was indeterminate.    Severe tricuspid valve regurgitation is present.    Estimated right ventricular systolic pressure from tricuspid regurgitation is moderately elevated (45-55 mmHg).    The inferior vena cava is dilated. IVC inspiratory collapse is absent.      Consults:   Consults       Date and Time Order Name Status Description    11/4/2023  6:27 PM Hematology & Oncology Inpatient Consult Completed             Discharge Details        Discharge Medications        New Medications        Instructions Start Date   cetirizine 10 MG tablet  Commonly known as: zyrTEC   10 mg, Oral, Daily   Start Date: November 9, 2023     Insulin Glargine 100 UNIT/ML injection pen  Commonly known as: LANTUS SOLOSTAR  Replaces: BASAGLAR KWIKPEN 100 UNIT/ML injection pen   25 Units, Subcutaneous, Nightly, Dx code: E11.65      Insulin Lispro (1 Unit Dial) 100 UNIT/ML solution pen-injector  Commonly known as: HumaLOG KwikPen   7 Units, Subcutaneous, 3 Times Daily Before Meals, Dx code: E11.65      oxymetazoline 0.05 % nasal spray  Commonly known as: AFRIN   2 sprays, Nasal, 2 Times Daily      Pen Needles 32G X 4 MM misc   1 each, Does not apply, 4 Times Daily Before Meals & Nightly, Dx code:  E11.65      predniSONE 20 MG tablet  Commonly known as: DELTASONE   20 mg, Oral, Daily With Breakfast   Start Date: November 9, 2023            Changes to Medications        Instructions Start Date   Farxiga 10 MG tablet  Generic drug: dapagliflozin Propanediol  What changed:   how much to take  additional instructions   1 mg, Oral, Daily, Dx code: E11.65      FreeStyle Zahira 2 Sensor misc  What changed:   how much to take  how to take this  when to take this  additional instructions   1 each, Does not apply, 4 Times Daily Before Meals & Nightly, Dx code: E11.65             Continue These Medications        Instructions Start Date   acetaminophen 325 MG tablet  Commonly known as: TYLENOL   2 tablets, Oral, Every 4 Hours PRN      ALPRAZolam 0.5 MG tablet  Commonly known as: Xanax   0.5 mg, Oral, Nightly PRN      furosemide 40 MG tablet  Commonly known as: LASIX   1 tablet, Oral, Daily      gabapentin 300 MG capsule  Commonly known as: NEURONTIN   600 mg, Oral, 3 Times Daily      magic mouthwash   5 mL, Swish & Spit, 4 Times Daily, 164 cc Nystatin 140 cc Benadryl 96 cc Viscous Xylocaine      midodrine 10 MG tablet  Commonly known as: PROAMATINE   10 mg, Oral, Every 8 Hours      mirtazapine 15 MG tablet  Commonly known as: REMERON   15 mg, Oral, Every Night at Bedtime      ondansetron ODT 4 MG disintegrating tablet  Commonly known as: ZOFRAN-ODT   4 mg, Translingual, Every 8 Hours PRN      pain patient supplied pump   0.375 mL/hr, Intrathecal, Daily, Active pump Prescriber: Dr. Shelton - pain management  Med name/ concentration: Dilaudid  Last refill: due soon Lockout period: every 4 hours       prochlorperazine 10 MG tablet  Commonly known as: COMPAZINE   10 mg, Oral, Every 6 Hours PRN      prochlorperazine 25 MG suppository  Commonly known as: COMPAZINE   25 mg, Rectal, Every 12 Hours PRN, Use when unable to take oral compazine      promethazine 12.5 MG tablet  Commonly known as: PHENERGAN   12.5 mg, Oral, Every 6  Hours PRN      Sprycel 100 MG chemo tablet  Generic drug: dasatinib   100 mg, Oral, Daily      dasatinib 100 MG chemo tablet  Commonly known as: SPRYCEL   100 mg, Oral, Daily      Symbicort 160-4.5 MCG/ACT inhaler  Generic drug: budesonide-formoterol   2 puffs, Inhalation, 2 Times Daily - RT      tiZANidine 4 MG tablet  Commonly known as: ZANAFLEX   1 tablet, Oral, Daily             Stop These Medications      BASAGLAR KWIKPEN 100 UNIT/ML injection pen  Replaced by: Insulin Glargine 100 UNIT/ML injection pen              No Known Allergies    Discharge Disposition:   [place] [facility]    Diet:  Hospital:  Diet Order   Procedures    Diet: Diabetic Diets; Consistent Carbohydrate; Texture: Regular Texture (IDDSI 7); Fluid Consistency: Thin (IDDSI 0)       Discharge Activity:   Activity Instructions       Activity as Tolerated              CODE STATUS:  Code Status and Medical Interventions:   Ordered at: 11/04/23 1749     Code Status (Patient has no pulse and is not breathing):    CPR (Attempt to Resuscitate)     Medical Interventions (Patient has pulse or is breathing):    Full Support         Future Appointments   Date Time Provider Department Center   11/9/2023 To Be Determined Loida Connell RN [facility]   11/13/2023 To Be Determined Loida Connell RN [facility]   11/20/2023 To Be Determined Loida Connell RN [facility]   11/27/2023 To Be Determined Loida Connell RN [facility]       Additional Instructions for the Follow-ups that You Need to Schedule       Call MD With Problems / Concerns   As directed      Instructions: Primary care provider versus oncology as appropriate    Order Comments: Instructions: Primary care provider versus oncology as appropriate         Discharge Follow-up with PCP   As directed       Currently Documented PCP:    Provider, No Known    PCP Phone Number:    None     Follow Up Details: Within one week of discharge        Discharge Follow-up with Specialty:  Oncology follow-up as per their recommendations   As directed      Specialty: Oncology follow-up as per their recommendations              Time spent on Discharge including face to face service:  25 minutes    Signature: Electronically signed by Yuniel Yancey MD, 11/08/23, 12:49 EST.  [facility] Hospitalist Team

## 2023-11-09 LAB
BH BB BLOOD EXPIRATION DATE: NORMAL
BH BB BLOOD EXPIRATION DATE: NORMAL
BH BB BLOOD TYPE BARCODE: 9500
BH BB BLOOD TYPE BARCODE: 9500
BH BB DISPENSE STATUS: NORMAL
BH BB DISPENSE STATUS: NORMAL
BH BB PRODUCT CODE: NORMAL
BH BB PRODUCT CODE: NORMAL
BH BB UNIT NUMBER: NORMAL
BH BB UNIT NUMBER: NORMAL
CROSSMATCH INTERPRETATION: NORMAL
CROSSMATCH INTERPRETATION: NORMAL
UNIT  ABO: NORMAL
UNIT  ABO: NORMAL
UNIT  RH: NORMAL
UNIT  RH: NORMAL

## 2023-11-09 NOTE — OUTREACH NOTE
Prep Survey      Flowsheet Row Responses   [facility] facility patient discharged from? [facility]   Is LACE score < 7 ? No   Eligibility Readm Mgmt   Discharge diagnosis Adverse effect of chemotherapy   Does the patient have one of the following disease processes/diagnoses(primary or secondary)? Other   Does the patient have Home health ordered? Yes   What is the Home health agency?  [facility]   Is there a DME ordered? No   Prep survey completed? Yes            Evelyn WEBB - Registered Nurse

## 2023-11-09 NOTE — CASE MANAGEMENT/SOCIAL WORK
Case Management Discharge Note      Final Note: Home with Home Health             Transportation Services  Private: Car    Final Discharge Disposition Code: 06 - home with home health care

## 2023-11-10 ENCOUNTER — HOME CARE VISIT (OUTPATIENT)
Dept: HOME HEALTH SERVICES | Facility: HOME HEALTHCARE | Age: 48
End: 2023-11-10
Payer: MEDICAID

## 2023-11-10 ENCOUNTER — TELEPHONE (OUTPATIENT)
Dept: DIABETES SERVICES | Facility: HOSPITAL | Age: 48
End: 2023-11-10
Payer: MEDICAID

## 2023-11-10 VITALS
DIASTOLIC BLOOD PRESSURE: 64 MMHG | RESPIRATION RATE: 19 BRPM | SYSTOLIC BLOOD PRESSURE: 148 MMHG | OXYGEN SATURATION: 96 % | HEART RATE: 102 BPM | TEMPERATURE: 98.4 F

## 2023-11-10 PROCEDURE — G0299 HHS/HOSPICE OF RN EA 15 MIN: HCPCS

## 2023-11-11 NOTE — HOME HEALTH
Routine Visit Note:    Skill/education provided: cardiopulmonary assessment, med education, pain assessment, fall prevention education, pain assessment    Patient/caregiver response: pt stated understanding    Plan for next visit:  cardiopulmonary assessment, med education, pain assessment, fall prevention education, pain assessment      Other pertinent info: monitor for falls

## 2023-11-13 ENCOUNTER — HOME CARE VISIT (OUTPATIENT)
Dept: HOME HEALTH SERVICES | Facility: HOME HEALTHCARE | Age: 48
End: 2023-11-13
Payer: COMMERCIAL

## 2023-11-13 ENCOUNTER — LAB REQUISITION (OUTPATIENT)
Dept: LAB | Facility: HOSPITAL | Age: 48
End: 2023-11-13
Payer: MEDICARE

## 2023-11-13 ENCOUNTER — HOME CARE VISIT (OUTPATIENT)
Dept: HOME HEALTH SERVICES | Facility: HOME HEALTHCARE | Age: 48
End: 2023-11-13
Payer: MEDICAID

## 2023-11-13 VITALS
RESPIRATION RATE: 19 BRPM | SYSTOLIC BLOOD PRESSURE: 138 MMHG | DIASTOLIC BLOOD PRESSURE: 64 MMHG | TEMPERATURE: 99.4 F | HEART RATE: 107 BPM | OXYGEN SATURATION: 96 %

## 2023-11-13 DIAGNOSIS — C92.10 CHRONIC MYELOID LEUKEMIA, BCR/ABL-POSITIVE, NOT HAVING ACHIEVED REMISSION: ICD-10-CM

## 2023-11-13 LAB
ALBUMIN SERPL-MCNC: 3.7 G/DL (ref 3.5–5.2)
ALBUMIN/GLOB SERPL: 1.3 G/DL
ALP SERPL-CCNC: 342 U/L (ref 39–117)
ALT SERPL W P-5'-P-CCNC: 92 U/L (ref 1–33)
ANION GAP SERPL CALCULATED.3IONS-SCNC: 13 MMOL/L (ref 5–15)
ANISOCYTOSIS BLD QL: ABNORMAL
AST SERPL-CCNC: 75 U/L (ref 1–32)
BILIRUB SERPL-MCNC: 0.6 MG/DL (ref 0–1.2)
BLASTS NFR BLD MANUAL: 1 % (ref 0–0)
BUN SERPL-MCNC: 16 MG/DL (ref 6–20)
BUN/CREAT SERPL: 25.4 (ref 7–25)
CALCIUM SPEC-SCNC: 9.4 MG/DL (ref 8.6–10.5)
CHLORIDE SERPL-SCNC: 104 MMOL/L (ref 98–107)
CO2 SERPL-SCNC: 24 MMOL/L (ref 22–29)
CREAT SERPL-MCNC: 0.63 MG/DL (ref 0.57–1)
DEPRECATED RDW RBC AUTO: 54.7 FL (ref 37–54)
EGFRCR SERPLBLD CKD-EPI 2021: 109.6 ML/MIN/1.73
EOSINOPHIL # BLD MANUAL: 0.1 10*3/MM3 (ref 0–0.4)
EOSINOPHIL NFR BLD MANUAL: 2 % (ref 0.3–6.2)
ERYTHROCYTE [DISTWIDTH] IN BLOOD BY AUTOMATED COUNT: 17.1 % (ref 12.3–15.4)
GLOBULIN UR ELPH-MCNC: 2.9 GM/DL
GLUCOSE SERPL-MCNC: 108 MG/DL (ref 65–99)
HCT VFR BLD AUTO: 21.6 % (ref 34–46.6)
HGB BLD-MCNC: 7.1 G/DL (ref 12–15.9)
LYMPHOCYTES # BLD MANUAL: 2.18 10*3/MM3 (ref 0.7–3.1)
LYMPHOCYTES NFR BLD MANUAL: 10 % (ref 5–12)
MCH RBC QN AUTO: 29 PG (ref 26.6–33)
MCHC RBC AUTO-ENTMCNC: 32.9 G/DL (ref 31.5–35.7)
MCV RBC AUTO: 88.2 FL (ref 79–97)
MONOCYTES # BLD: 0.52 10*3/MM3 (ref 0.1–0.9)
MYELOCYTES NFR BLD MANUAL: 4 % (ref 0–0)
NEUTROPHILS # BLD AUTO: 2.13 10*3/MM3 (ref 1.7–7)
NEUTROPHILS NFR BLD MANUAL: 41 % (ref 42.7–76)
NRBC SPEC MANUAL: 2 /100 WBC (ref 0–0.2)
PATHOLOGY REVIEW: YES
PLATELET # BLD AUTO: 33 10*3/MM3 (ref 140–450)
PMV BLD AUTO: 6.7 FL (ref 6–12)
POLYCHROMASIA BLD QL SMEAR: ABNORMAL
POTASSIUM SERPL-SCNC: 4.3 MMOL/L (ref 3.5–5.2)
PROT SERPL-MCNC: 6.6 G/DL (ref 6–8.5)
RBC # BLD AUTO: 2.45 10*6/MM3 (ref 3.77–5.28)
SCAN SLIDE: NORMAL
SMALL PLATELETS BLD QL SMEAR: ABNORMAL
SODIUM SERPL-SCNC: 141 MMOL/L (ref 136–145)
VARIANT LYMPHS NFR BLD MANUAL: 42 % (ref 19.6–45.3)
WBC MORPH BLD: NORMAL
WBC NRBC COR # BLD: 5.2 10*3/MM3 (ref 3.4–10.8)

## 2023-11-13 PROCEDURE — 80053 COMPREHEN METABOLIC PANEL: CPT | Performed by: INTERNAL MEDICINE

## 2023-11-13 PROCEDURE — 85025 COMPLETE CBC W/AUTO DIFF WBC: CPT | Performed by: INTERNAL MEDICINE

## 2023-11-13 PROCEDURE — G0299 HHS/HOSPICE OF RN EA 15 MIN: HCPCS

## 2023-11-13 NOTE — HOME HEALTH
Routine Visit Note:    Skill/education provided: cardiopulmonary assessment, med education,pain assessment, labs per md order     Patient/caregiver response: pt stated understanding    Plan for next visit: cardiopulmonary assessment, med education,pain assessment, labs per md order       Other pertinent info: monitor for abd pain

## 2023-11-14 ENCOUNTER — READMISSION MANAGEMENT (OUTPATIENT)
Dept: CALL CENTER | Facility: HOSPITAL | Age: 48
End: 2023-11-14
Payer: MEDICAID

## 2023-11-14 DIAGNOSIS — F41.8 DEPRESSION WITH ANXIETY: ICD-10-CM

## 2023-11-14 LAB
LAB AP CASE REPORT: NORMAL
PATH REPORT.FINAL DX SPEC: NORMAL

## 2023-11-14 RX ORDER — MIRTAZAPINE 15 MG/1
15 TABLET, FILM COATED ORAL
Qty: 30 TABLET | Refills: 2 | Status: SHIPPED | OUTPATIENT
Start: 2023-11-14

## 2023-11-14 NOTE — OUTREACH NOTE
Medical Week 1 Survey      Flowsheet Row Responses   [facility] patient discharged from? [facility]   Does the patient have one of the following disease processes/diagnoses(primary or secondary)? Other   Week 1 attempt successful? Yes   Call start time 1205   Call end time 1211   Meds reviewed with patient/caregiver? Yes   Is the patient having any side effects they believe may be caused by any medication additions or changes? No   Does the patient have all medications ordered at discharge? Yes   Is the patient taking all medications as directed (includes completed medication regime)? Yes   Does the patient have a primary care provider?  No   Does the patient have an appointment with their PCP within 7 days of discharge? N/A   Comments regarding PCP Pt reports that she will have a PCP after the first of the year   Comments Pt reports appt with Hem/Onc next week.   What is the Home health agency?  [facility]   Has home health visited the patient within 72 hours of discharge? Yes   Psychosocial issues? Yes   Psychosocial comments chemo being held pt reports   Comments Pt c/o abdominal swelling since DC making it difficult to breathe.The pt reports that this is not urgent and happens when her MD holds her chemo, which is happening now. Pt advised if having increased abd girth or DONALD she should be evaluated to call Hem/onc, pt declines both options.Pt reports no issues voiding. Pt reports no questions or issues at this time.   Did the patient receive a copy of their discharge instructions? Yes   What is the patient's perception of their health status since discharge? Same   Is the patient/caregiver able to teach back signs and symptoms related to disease process for when to call PCP? Yes   Is the patient/caregiver able to teach back signs and symptoms related to disease process for when to call 911? Yes   Week 1 call completed? Yes   Is the patient interested in additional calls from an ambulatory ?  No   Call end time 1211            Sybil AMAYA - Registered Nurse

## 2023-11-20 ENCOUNTER — LAB REQUISITION (OUTPATIENT)
Dept: LAB | Facility: HOSPITAL | Age: 48
End: 2023-11-20
Payer: MEDICARE

## 2023-11-20 ENCOUNTER — HOME CARE VISIT (OUTPATIENT)
Dept: HOME HEALTH SERVICES | Facility: HOME HEALTHCARE | Age: 48
End: 2023-11-20
Payer: COMMERCIAL

## 2023-11-20 ENCOUNTER — HOME CARE VISIT (OUTPATIENT)
Dept: HOME HEALTH SERVICES | Facility: HOME HEALTHCARE | Age: 48
End: 2023-11-20
Payer: MEDICARE

## 2023-11-20 VITALS
SYSTOLIC BLOOD PRESSURE: 132 MMHG | HEART RATE: 91 BPM | TEMPERATURE: 98.7 F | OXYGEN SATURATION: 95 % | DIASTOLIC BLOOD PRESSURE: 62 MMHG | RESPIRATION RATE: 19 BRPM

## 2023-11-20 DIAGNOSIS — C92.10 CHRONIC MYELOID LEUKEMIA, BCR/ABL-POSITIVE, NOT HAVING ACHIEVED REMISSION: ICD-10-CM

## 2023-11-20 LAB
ALBUMIN SERPL-MCNC: 3.7 G/DL (ref 3.5–5.2)
ALBUMIN/GLOB SERPL: 1.2 G/DL
ALP SERPL-CCNC: 427 U/L (ref 39–117)
ALT SERPL W P-5'-P-CCNC: 45 U/L (ref 1–33)
ANION GAP SERPL CALCULATED.3IONS-SCNC: 8 MMOL/L (ref 5–15)
AST SERPL-CCNC: 27 U/L (ref 1–32)
BASOPHILS # BLD AUTO: 0 10*3/MM3 (ref 0–0.2)
BASOPHILS NFR BLD AUTO: 0.5 % (ref 0–1.5)
BILIRUB SERPL-MCNC: 0.8 MG/DL (ref 0–1.2)
BUN SERPL-MCNC: 13 MG/DL (ref 6–20)
BUN/CREAT SERPL: 17.6 (ref 7–25)
CALCIUM SPEC-SCNC: 10.7 MG/DL (ref 8.6–10.5)
CHLORIDE SERPL-SCNC: 100 MMOL/L (ref 98–107)
CO2 SERPL-SCNC: 28 MMOL/L (ref 22–29)
CREAT SERPL-MCNC: 0.74 MG/DL (ref 0.57–1)
DEPRECATED RDW RBC AUTO: 62.1 FL (ref 37–54)
EGFRCR SERPLBLD CKD-EPI 2021: 99.9 ML/MIN/1.73
EOSINOPHIL # BLD AUTO: 0 10*3/MM3 (ref 0–0.4)
EOSINOPHIL NFR BLD AUTO: 0.4 % (ref 0.3–6.2)
ERYTHROCYTE [DISTWIDTH] IN BLOOD BY AUTOMATED COUNT: 19.4 % (ref 12.3–15.4)
GLOBULIN UR ELPH-MCNC: 3 GM/DL
GLUCOSE SERPL-MCNC: 258 MG/DL (ref 65–99)
HCT VFR BLD AUTO: 23.1 % (ref 34–46.6)
HGB BLD-MCNC: 7.7 G/DL (ref 12–15.9)
LYMPHOCYTES # BLD AUTO: 1.3 10*3/MM3 (ref 0.7–3.1)
LYMPHOCYTES NFR BLD AUTO: 33.4 % (ref 19.6–45.3)
MCH RBC QN AUTO: 30.4 PG (ref 26.6–33)
MCHC RBC AUTO-ENTMCNC: 33.1 G/DL (ref 31.5–35.7)
MCV RBC AUTO: 91.6 FL (ref 79–97)
MONOCYTES # BLD AUTO: 0.5 10*3/MM3 (ref 0.1–0.9)
MONOCYTES NFR BLD AUTO: 12.9 % (ref 5–12)
NEUTROPHILS NFR BLD AUTO: 2.1 10*3/MM3 (ref 1.7–7)
NEUTROPHILS NFR BLD AUTO: 52.8 % (ref 42.7–76)
NRBC BLD AUTO-RTO: 0.2 /100 WBC (ref 0–0.2)
PLATELET # BLD AUTO: 105 10*3/MM3 (ref 140–450)
PMV BLD AUTO: 7.9 FL (ref 6–12)
POTASSIUM SERPL-SCNC: 4.1 MMOL/L (ref 3.5–5.2)
PROT SERPL-MCNC: 6.7 G/DL (ref 6–8.5)
RBC # BLD AUTO: 2.53 10*6/MM3 (ref 3.77–5.28)
SODIUM SERPL-SCNC: 136 MMOL/L (ref 136–145)
WBC NRBC COR # BLD AUTO: 4 10*3/MM3 (ref 3.4–10.8)

## 2023-11-20 PROCEDURE — G0299 HHS/HOSPICE OF RN EA 15 MIN: HCPCS

## 2023-11-20 PROCEDURE — 80053 COMPREHEN METABOLIC PANEL: CPT | Performed by: INTERNAL MEDICINE

## 2023-11-20 PROCEDURE — 85025 COMPLETE CBC W/AUTO DIFF WBC: CPT | Performed by: INTERNAL MEDICINE

## 2023-11-21 ENCOUNTER — HOME CARE VISIT (OUTPATIENT)
Dept: HOME HEALTH SERVICES | Facility: HOME HEALTHCARE | Age: 48
End: 2023-11-21
Payer: COMMERCIAL

## 2023-11-21 DIAGNOSIS — E83.52 HYPERCALCEMIA: ICD-10-CM

## 2023-11-21 DIAGNOSIS — D64.9 ANEMIA, UNSPECIFIED TYPE: Primary | ICD-10-CM

## 2023-11-22 ENCOUNTER — READMISSION MANAGEMENT (OUTPATIENT)
Dept: CALL CENTER | Facility: HOSPITAL | Age: 48
End: 2023-11-22
Payer: MEDICARE

## 2023-11-22 NOTE — OUTREACH NOTE
Medical Week 2 Survey      Flowsheet Row Responses   [facility] patient discharged from? [facility]   Does the patient have one of the following disease processes/diagnoses(primary or secondary)? Other   Week 2 attempt successful? Yes   Call start time 1540   Discharge diagnosis Adverse effect of chemotherapy   Call end time 1545   Meds reviewed with patient/caregiver? Yes   Is the patient taking all medications as directed (includes completed medication regime)? Yes   Has the patient kept scheduled appointments due by today? N/A   What is the Home health agency?  [facility]   Has home health visited the patient within 72 hours of discharge? Yes   Psychosocial issues? No   Did the patient receive a copy of their discharge instructions? Yes   Nursing interventions Reviewed instructions with patient   What is the patient's perception of their health status since discharge? Improving  [petechial rash improved.]   Is the patient/caregiver able to teach back the hierarchy of who to call/visit for symptoms/problems? PCP, Specialist, Home health nurse, Urgent Care, ED, 911 Yes   Week 2 Call Completed? Yes   Wrap up additional comments Pt reports that a HOSP CM was going to give her resources on housing, however she did not recieve it. Offered to have [facility] CM reach out to her and she declined. Pt will call hospital and ask to speak with HOSP CM>   Call end time 1545            PETER DOLAN - Registered Nurse

## 2023-11-27 ENCOUNTER — HOME CARE VISIT (OUTPATIENT)
Dept: HOME HEALTH SERVICES | Facility: HOME HEALTHCARE | Age: 48
End: 2023-11-27
Payer: COMMERCIAL

## 2023-11-27 ENCOUNTER — LAB REQUISITION (OUTPATIENT)
Dept: LAB | Facility: HOSPITAL | Age: 48
End: 2023-11-27
Payer: MEDICARE

## 2023-11-27 ENCOUNTER — TELEPHONE (OUTPATIENT)
Dept: ONCOLOGY | Facility: CLINIC | Age: 48
End: 2023-11-27
Payer: MEDICARE

## 2023-11-27 VITALS — HEART RATE: 101 BPM | SYSTOLIC BLOOD PRESSURE: 132 MMHG | DIASTOLIC BLOOD PRESSURE: 62 MMHG | OXYGEN SATURATION: 97 %

## 2023-11-27 DIAGNOSIS — C92.10 CHRONIC MYELOID LEUKEMIA, BCR/ABL-POSITIVE, NOT HAVING ACHIEVED REMISSION: ICD-10-CM

## 2023-11-27 LAB
BASOPHILS # BLD AUTO: 0 10*3/MM3 (ref 0–0.2)
BASOPHILS NFR BLD AUTO: 1.4 % (ref 0–1.5)
DEPRECATED RDW RBC AUTO: 59.9 FL (ref 37–54)
EOSINOPHIL # BLD AUTO: 0 10*3/MM3 (ref 0–0.4)
EOSINOPHIL NFR BLD AUTO: 0.5 % (ref 0.3–6.2)
ERYTHROCYTE [DISTWIDTH] IN BLOOD BY AUTOMATED COUNT: 18.9 % (ref 12.3–15.4)
HCT VFR BLD AUTO: 24.4 % (ref 34–46.6)
HGB BLD-MCNC: 8.2 G/DL (ref 12–15.9)
LYMPHOCYTES # BLD AUTO: 1.5 10*3/MM3 (ref 0.7–3.1)
LYMPHOCYTES NFR BLD AUTO: 47.3 % (ref 19.6–45.3)
MCH RBC QN AUTO: 30.5 PG (ref 26.6–33)
MCHC RBC AUTO-ENTMCNC: 33.7 G/DL (ref 31.5–35.7)
MCV RBC AUTO: 90.3 FL (ref 79–97)
MONOCYTES # BLD AUTO: 0.5 10*3/MM3 (ref 0.1–0.9)
MONOCYTES NFR BLD AUTO: 15.4 % (ref 5–12)
NEUTROPHILS NFR BLD AUTO: 1.1 10*3/MM3 (ref 1.7–7)
NEUTROPHILS NFR BLD AUTO: 35.4 % (ref 42.7–76)
NRBC BLD AUTO-RTO: 0.4 /100 WBC (ref 0–0.2)
PLATELET # BLD AUTO: 214 10*3/MM3 (ref 140–450)
PMV BLD AUTO: 7.9 FL (ref 6–12)
RBC # BLD AUTO: 2.7 10*6/MM3 (ref 3.77–5.28)
WBC NRBC COR # BLD AUTO: 3.1 10*3/MM3 (ref 3.4–10.8)

## 2023-11-27 PROCEDURE — 85025 COMPLETE CBC W/AUTO DIFF WBC: CPT | Performed by: INTERNAL MEDICINE

## 2023-11-27 PROCEDURE — G0299 HHS/HOSPICE OF RN EA 15 MIN: HCPCS

## 2023-11-28 NOTE — HOME HEALTH
60 Day Summary  Home Health need continues for: cardiopulmonary assessment, med education, pain assessment, fall prevention, labs per md order, monitoring for chemo reactions  Primary diagnoses/co-morbidities/recent procedures in past 60 days that impact current episode:Chronic myeloid leukemia, BCR/ABL-positive, not having achieved remission     Current level of functional ability: assist of one  Homebound status and living arrangements: patient is homebound related to extended recovery time required to resume activity  Goals accomplished and/or measurable progress toward unmet goals in past 60 days: patient had a set back related to chemo.   Focus of care for next 60 days for each discipline ordered: Chronic myeloid leukemia, BCR/ABL-positive, not having achieved remission     Skin integrity/wound status: no skin breakdown   Estimated date when home care services will end 1.27.2024  SDOH changes/barriers (i.e. Caregiver availability, social isolation, environment, income, transportation access, food insecurity etc.) none   Need for MSW referral?  n  Plan for next visit is cardiopulmonary assessment, med education, pain assessment, fall prevention education, labs per md order  SN 1w8

## 2023-11-28 NOTE — PROGRESS NOTES
Hematology/Oncology Outpatient Follow Up    PATIENT NAME:Nieves Mc  :1975  MRN: [id number]  PRIMARY CARE PHYSICIAN: Provider, No Known  REFERRING PHYSICIAN: Provider, No Known    Chief Complaint   Patient presents with    Follow-up     Follow up  CML        HISTORY OF PRESENT ILLNESS:         Patient is a 48 y.o. female with PMH significant for CML on treatment with Imatinib.  Patient stated that she typically feels poorly with Imatinib with frequent nausea and vomiting.  Also complained of weakness and fatigue.  Patient presented to ED on 10/22/18 with complaints of an elevated blood sugar around 400.  Stated she felt poorly and has had a productive cough with yellow sputum.  Complained of nausea and stated she was unable to keep any food down anytime she ate.  The patient reported subjective fever without chills.  She stated she had been coughing so hard that she was vomiting.  She denied hematemesis.  Denied diarrhea, constipation or blood in her stool.       Patient’s chest x-ray showed no evidence of acute pulmonary embolus.  The patient has ground-glass opacities throughout the lungs.  There is some air trapping noted which would appear to be   infectious.  Patient was started on antibiotics.      Hem/Onc was consulted on 10/23/18 for co-management of patient with CML.  Patient was seen by Dr. Lerma on 10/12 on previous admission for patient reported CML on Imatinib (Gleevec).  Patient reports she is under the care of Dr. Roach at [facility] in [place].  Patient was seen by Dr. Lerma in May 2018 when patient presented with hematuria, which was attributed to her Imatinib.  Dr. Lerma followed during hospitalization but then patient returned to Dr. Roach for her usual follow up.  She was again seen on 10/12.  Patient is stating she will switch to Dr. Lerma.    Review of Dr. Roach’s note:  On 18 patient had BCR-abl which was 61.326.  Patient had been on Imatinib 400 mg daily.   She had presented in March 2018 with WBC of 24, hemoglobin 13.1, platelet count of 1,067,000.  Patient apparently had follow up BCR-abl in August 2018, results are not available for review.  Her bone marrow aspiration and biopsy was also performed at that time is currently not available for review.    11/6/18 - .  Uric acid 6.5.  BCR-abl by RT-PCR was measured at 3.36%.    Due to thrombocytosis, patient was placed on Hydroxyurea 500 mg twice a day on 12/11/18.  Platelet count juana to 900,000.  Patient was noncompliant with CBCs that were recommended.    Patient was asked to return to the office after not being able to reach her.   12/27/18 - Bone marrow aspiration and biopsy was consistent with chronic myeloid leukemia.  BCR-abl positive, chronic phase.  ABL kinase mutational analysis is currently pending on the bone marrow.    1/3/19 - Repeat CBC, WBC 17, hemoglobin 11.9, platelet count 1,072,000.  Hydroxyurea was increased to 1 gm twice a day with follow up CBC.    1/6/19 - Follow up CBC showed progressive increase in platelet to 1.1 million.  Patient was offered admission to the hospital, which she declined.  Hydroxyurea was increased to 1 gm three   times a day as of 1/6/19.   1/7/19 - EKG shows sinus tachycardia.   milliseconds.    ABL kinase on peripheral blood was ordered on 1/11/19.  Based on sequence analysis, there was no mutation detected.    2/12/19 - Patient was initiated on Tasigna 300 mg twice a day.  2/13/19 - Urinalysis was negative for hematuria.   2/22/19 -  milliseconds.  3/13/19 - EKG with QTC is 413 milliseconds.  Nonspecific changes noted.    4/18/19 - EKG showed QTC prolonged to 453 milliseconds.  This is changed from prior.  4/18/19 - Free T4 normal at 0.91.  Magnesium was 1.7.  BUN is 6.  Creatinine 0.7.  Bilirubin 2.2.  Phosphorous normal 3.7.  TSH 0.43, normal.  Free T3 was normal at 3.47.  Ionized calcium   normal at 1.23.  BCR-abl was 0.522%.    5/7/19 - EKG showed  QTC of 441 milliseconds.  QT was 366.     Patient has been lost to follow-up since 2019 for CML.  Patient states that she lost her insurance.  She also does not have any primary care physician at this time.  She is diabetic on insulin.  Patient was recently admitted to the hospital for pneumonia due to COVID-19 infection.  At that time patient made a decision to become compliant with treatment.  She is currently on to Cigna 300 mg p.o. twice a day.  She is also on hydroxyurea 1 g twice a day.  Overall she feels better, she has more energy.  3/1/2022 white count is 53.92, hemoglobin 11.7 and platelets are 472    6/1/2022: Patient has not been seen since March 2022.  Also verified that she has not been taking to Tasigna since February 2022.  She comes in today with cough for 1 and half months, shortness of breath, denies fevers.  6/30/2022 patient had 2D echocardiogram which showed an EF of 41 to 45%.  Left ventricular cavity is mildly dilated.  She was diagnosed with nonischemic cardiomyopathy    11/18/2022: Patient was transferred from [facility] to [facility] due to cardiac failure.  She was then seen by NP [facility] hematology department.  Patient underwent tumor aspiration and biopsy at that time did not show chronic myeloid leukemia in accelerated phase markedly hypercellular marrow with megakaryocytic and myeloid hyperplasia with atypia and increased basophils blasts are not increased less than 1% moderate reticulin fibrosis.  According to the patient hydroxyurea was discontinued she was prescribed Gleevec 600 mg daily but she has only been taking 400 mg as she cannot find other milligram tablets.  She is already been pump inserted into her pelvic area.  She continues to experience nausea which resulted in her not taking the baclofen she should.  1/12/2023: WBC 17.64, hemoglobin 10.2, MCV 88.8, platelets 458,000.  On Gleevec 600 mg daily and hydroxyurea 500 mg twice daily.  1/19/2023:  WBC 10.67, hemoglobin 10.0, MCV 86.4, platelets 370,000.  Patient on Gleevec 600 mg daily and hydroxyurea 500 mg once a day.  Patient instructed at the visit to discontinue her hydroxyurea.  2023: WBC 17.75, hemoglobin 10.4, MCV 87.2, platelets 425,000.  On Gleevec 600 mg daily.  3/10/2023: 2D echocardiogram showing EF of 44% EKG showing sinus tachycardia QTc of 491  10/5/2023: Patient was initiated on Dasatinib 100 mg p.o. daily  2023-2023: Patient hospitalized at [facility] due to rash that was thought to be secondary to Sprycel and elevated glucose.  Sprycel was held and patient was treated with a 5-day course of prednisone.  2023: WBC 3.10, hemoglobin 8.2, MCV 90.3, platelets 214,000, ANC 1100.    Past Medical History:   Diagnosis Date    Adverse effect of chemotherapy 2023    Bone pain     Chronic constipation 2023    COVID-19 virus infection 2022    Diabetes mellitus     Diabetic gastroparesis 2023    Extremity pain     Carlin. legs pain    Fall during current hospitalization 2023    Leg pain     left leg greater    Migraine     Petechial rash 2023    Pubic ramus fracture, left, closed, initial encounter 2023    Pulmonary embolism     Vision loss     doing surgery       Past Surgical History:   Procedure Laterality Date    BONE MARROW BIOPSY      BREAST SURGERY      BRONCHOSCOPY N/A 2022    Procedure: BRONCHOSCOPY bilateral lung washing;  Surgeon: Charlene Camara MD;  Location: [facility] ENDOSCOPY;  Service: Pulmonary;  Laterality: N/A;  post: rule out infection vs transfusion lung injury     SECTION      CHOLECYSTECTOMY      COLONOSCOPY N/A 2023    Procedure: COLONOSCOPY;  Surgeon: Freddy Villeda MD;  Location: [facility] ENDOSCOPY;  Service: Gastroenterology;  Laterality: N/A;  poor prep    ENDOSCOPY N/A 2023    Procedure: ESOPHAGOGASTRODUODENOSCOPY with biopsy x2 areas;  Surgeon: Freddy Villeda MD;  Location: [facility] ENDOSCOPY;  Service:  Gastroenterology;  Laterality: N/A;  food in stomach;abnormal duodenal mucosa    EYE SURGERY      laser surgery due  to hemmorage--- 5/13/2021-- another surgery  lt eye11/15/21    RETINAL DETACHMENT SURGERY      SPINE SURGERY      Lombardi spinal block    TUBAL ABDOMINAL LIGATION           Current Outpatient Medications:     acetaminophen (TYLENOL) 325 MG tablet, Take 2 tablets by mouth Every 4 (Four) Hours As Needed. Indications: Fever, Pain, Disp: , Rfl:     ALPRAZolam (Xanax) 0.5 MG tablet, Take 1 tablet by mouth At Night As Needed for Anxiety. Indications: Feeling Anxious, Disp: 30 tablet, Rfl: 0    budesonide-formoterol (SYMBICORT) 160-4.5 MCG/ACT inhaler, Inhale 2 puffs 2 (Two) Times a Day. (Patient taking differently: Inhale 2 puffs 2 (Two) Times a Day.), Disp: 10.2 g, Rfl: 1    cetirizine (zyrTEC) 10 MG tablet, Take 1 tablet by mouth Daily., Disp: 30 tablet, Rfl: 0    Continuous Blood Gluc Sensor (FreeStyle Zahira 2 Sensor) misc, Use 1 each 4 (Four) Times a Day Before Meals & at Bedtime. Dx code: E11.65, Disp: 2 each, Rfl: 2    dapagliflozin Propanediol (Farxiga) 10 MG tablet, Take 10 mg (1 tablet) by mouth Daily. Dx code: E11.65, Disp: 30 tablet, Rfl: 2    dasatinib (SPRYCEL) 100 MG chemo tablet, Take 1 tablet by mouth Daily., Disp: 30 tablet, Rfl: 5    dasatinib (SPRYCEL) 100 MG chemo tablet, Take 1 tablet by mouth Daily. Indications: Acute Lymphocytic Leukemia, Disp: , Rfl:     furosemide (LASIX) 40 MG tablet, Take 1 tablet by mouth Daily. Indications: Edema, Disp: , Rfl:     gabapentin (NEURONTIN) 300 MG capsule, Take 2 capsules by mouth 3 (Three) Times a Day. Indications: Diabetes with Nerve Disease, Disp: , Rfl:     Insulin Glargine (LANTUS SOLOSTAR) 100 UNIT/ML injection pen, Inject 25 Units under the skin into the appropriate area as directed Every Night. Dx code: E11.65, Disp: 15 mL, Rfl: 2    Insulin Lispro, 1 Unit Dial, (HumaLOG KwikPen) 100 UNIT/ML solution pen-injector, Inject 7 Units under the  skin into the appropriate area as directed 3 (Three) Times a Day Before Meals. Dx code: E11.65, Disp: 15 mL, Rfl: 2    Insulin Pen Needle (Pen Needles) 32G X 4 MM misc, Use 1 each 4 (Four) Times a Day Before Meals & at Bedtime. Dx code: E11.65, Disp: 200 each, Rfl: 2    midodrine (PROAMATINE) 10 MG tablet, Take 1 tablet by mouth Every 8 (Eight) Hours., Disp: 90 tablet, Rfl: 0    mirtazapine (REMERON) 15 MG tablet, Take 1 tablet by mouth every night at bedtime. Indications: Major Depressive Disorder, Disp: 30 tablet, Rfl: 2    Nystatin (magic mouthwash), Swish and spit 5 mL 4 (Four) Times a Day. 164 cc Nystatin 140 cc Benadryl 96 cc Viscous Xylocaine, Disp: 100 mL, Rfl: 3    ondansetron ODT (ZOFRAN-ODT) 4 MG disintegrating tablet, Place 1 tablet on the tongue Every 8 (Eight) Hours As Needed for Nausea or Vomiting. Indications: Nausea and Vomiting, Disp: 30 tablet, Rfl: 2    oxymetazoline (AFRIN) 0.05 % nasal spray, 2 sprays into the nostril(s) as directed by provider 2 (Two) Times a Day., Disp: , Rfl:     pain patient supplied pump, 0.375 mL/hr by Intrathecal route Daily. Active pump Prescriber: Dr. Shelton - pain management  Med name/ concentration: Dilaudid  Last refill: due soon Lockout period: every 4 hours   Indications: chronic pain, Disp: , Rfl:     predniSONE (DELTASONE) 20 MG tablet, Take 1 tablet by mouth Daily With Breakfast., Disp: 3 tablet, Rfl: 0    prochlorperazine (COMPAZINE) 10 MG tablet, Take 1 tablet by mouth Every 6 (Six) Hours As Needed for Nausea or Vomiting., Disp: 30 tablet, Rfl: 2    prochlorperazine (COMPAZINE) 25 MG suppository, Insert 1 suppository into the rectum Every 12 (Twelve) Hours As Needed for Nausea or Vomiting. Use when unable to take oral compazine, Disp: 10 suppository, Rfl: 2    promethazine (PHENERGAN) 12.5 MG tablet, Take 1 tablet by mouth Every 6 (Six) Hours As Needed for Nausea or Vomiting., Disp: 30 tablet, Rfl: 0    tiZANidine (ZANAFLEX) 4 MG tablet, Take 1 tablet by  mouth Daily. Indications: Musculoskeletal Pain, Disp: , Rfl:   No current facility-administered medications for this visit.    Facility-Administered Medications Ordered in Other Visits:     acetaminophen (TYLENOL) tablet 650 mg, 650 mg, Oral, Once, Denisha Gill, JP    No Known Allergies    Family History   Problem Relation Age of Onset    Diabetes Mother     Diabetes Maternal Grandmother     Heart attack Maternal Grandmother     Stroke Maternal Grandmother        Cancer-related family history is not on file.    Social History     Tobacco Use    Smoking status: Never    Smokeless tobacco: Never   Vaping Use    Vaping Use: Never used   Substance Use Topics    Alcohol use: No    Drug use: No       I have reviewed and confirmed the accuracy of the patient's history: Chief complaint, HPI, ROS, and Subjective as entered by the MA/LPN/RN. JP Varela 11/29/23        SUBJECTIVE:  Nieves is here today for her hospital follow-up appointment and to be evaluated for resuming Sprycel.  She reports that the skin manifestations have resolved while holding Sprycel and completing her steroid.  She is willing to resume Sprycel but states she will not take at the 100 mg daily dose.  She voices feeling tired of being unwell.  She has trouble tolerating the medications for her CML but she also knows how poorly she will feel if she does not take anything to control the disease.  She is unsure how she would like to proceed and once her family to have a meeting to discuss her continuing treatment versus her only receiving care for her comfort.  She has trouble with insomnia, depression and anxiety and also pain control.      Nieves Mc reports a pain score of 9.  Given her pain assessment as noted, treatment options were discussed and the following options were decided upon as a follow-up plan to address the patient's pain: continuation of current treatment plan for pain. Patient is established with pain  "management.     REVIEW OF SYSTEMS:    Review of Systems   Constitutional: Negative for chills and fever.   HENT: Negative for ear pain, mouth sores, nosebleeds and sore throat.    Eyes: Negative for photophobia and visual disturbance.   Respiratory: Negative for wheezing and stridor.    Cardiovascular: Negative for chest pain and palpitations.   Gastrointestinal: Negative for abdominal pain, diarrhea, nausea and vomiting.   Endocrine: Negative for cold intolerance and heat intolerance.   Genitourinary: Negative for dysuria and hematuria.   Musculoskeletal: Negative for joint swelling and neck stiffness.  Positive for back and leg pain.   Skin: Negative for color change and rash.   Neurological: Negative for seizures and syncope.   Hematological: Negative for adenopathy.        No obvious bleeding   Psychiatric/Behavioral: Negative for agitation, confusion and hallucinations. Positive for insomnia, anxiety, depression.      OBJECTIVE:    Vitals:    11/29/23 1301   BP: 136/79   Pulse: 111   SpO2: 99%   Weight: Comment: Could not weigh patient   Height: 162.6 cm (64\")   PainSc:   9   PainLoc: Abdomen               Body mass index is 24.48 kg/m².    ECOG  (1) Restricted in physically strenuous activity, ambulatory and able to do work of light nature    Physical Exam  Vitals and nursing note reviewed.   Constitutional:       General: She is not in acute distress.     Appearance: She is not diaphoretic.   HENT:      Head: Normocephalic and atraumatic.   Eyes:      General: No scleral icterus.        Right eye: No discharge.         Left eye: No discharge.      Conjunctiva/sclera: Conjunctivae normal.   Neck:      Thyroid: No thyromegaly.   Cardiovascular:      Rate and Rhythm: Normal rate and regular rhythm.      Heart sounds: Normal heart sounds. No friction rub. No gallop.    Pulmonary:      Effort: Pulmonary effort is normal. No respiratory distress.      Breath sounds: No stridor. No wheezing.   Abdominal:      " General: Bowel sounds are normal.      Palpations: Abdomen is soft. There is no mass.      Tenderness: There is no abdominal tenderness. There is no guarding or rebound.   Musculoskeletal:         General: No tenderness. Normal range of motion.      Cervical back: Normal range of motion and neck supple.   Lymphadenopathy:      Cervical: No cervical adenopathy.   Skin:     General: Skin is warm.      Findings: No erythema or rash.   Neurological:      Mental Status: She is alert and oriented to person, place, and time.      Motor: No abnormal muscle tone.   Psychiatric:         Behavior: Behavior      I have reexamined the patient and the results are consistent with the previously documented exam. Denisha Gill, PJ      RECENT LABS    WBC   Date Value Ref Range Status   11/27/2023 3.10 (L) 3.40 - 10.80 10*3/mm3 Final     RBC   Date Value Ref Range Status   11/27/2023 2.70 (L) 3.77 - 5.28 10*6/mm3 Final     Hemoglobin   Date Value Ref Range Status   11/27/2023 8.2 (L) 12.0 - 15.9 g/dL Final     Hematocrit   Date Value Ref Range Status   11/27/2023 24.4 (L) 34.0 - 46.6 % Final     MCV   Date Value Ref Range Status   11/27/2023 90.3 79.0 - 97.0 fL Final     MCH   Date Value Ref Range Status   11/27/2023 30.5 26.6 - 33.0 pg Final     MCHC   Date Value Ref Range Status   11/27/2023 33.7 31.5 - 35.7 g/dL Final     RDW   Date Value Ref Range Status   11/27/2023 18.9 (H) 12.3 - 15.4 % Final     RDW-SD   Date Value Ref Range Status   11/27/2023 59.9 (H) 37.0 - 54.0 fl Final     MPV   Date Value Ref Range Status   11/27/2023 7.9 6.0 - 12.0 fL Final     Platelets   Date Value Ref Range Status   11/27/2023 214 140 - 450 10*3/mm3 Final     Neutrophil %   Date Value Ref Range Status   11/27/2023 35.4 (L) 42.7 - 76.0 % Final     Lymphocyte %   Date Value Ref Range Status   11/27/2023 47.3 (H) 19.6 - 45.3 % Final     Monocyte %   Date Value Ref Range Status   11/27/2023 15.4 (H) 5.0 - 12.0 % Final     Eosinophil %   Date  Value Ref Range Status   11/27/2023 0.5 0.3 - 6.2 % Final     Basophil %   Date Value Ref Range Status   11/27/2023 1.4 0.0 - 1.5 % Final     External Neutrophils Abs   Date Value Ref Range Status   11/30/2022 7.6 (H) 1.7 - 6.0 x10(3)/ul Final     Neutrophils, Absolute   Date Value Ref Range Status   11/27/2023 1.10 (L) 1.70 - 7.00 10*3/mm3 Final     Lymphocytes, Absolute   Date Value Ref Range Status   11/27/2023 1.50 0.70 - 3.10 10*3/mm3 Final     Monocytes, Absolute   Date Value Ref Range Status   11/27/2023 0.50 0.10 - 0.90 10*3/mm3 Final     Eosinophils, Absolute   Date Value Ref Range Status   11/27/2023 0.00 0.00 - 0.40 10*3/mm3 Final     Basophils, Absolute   Date Value Ref Range Status   11/27/2023 0.00 0.00 - 0.20 10*3/mm3 Final     nRBC   Date Value Ref Range Status   11/27/2023 0.4 (H) 0.0 - 0.2 /100 WBC Final       Lab Results   Component Value Date    GLUCOSE 258 (H) 11/20/2023    BUN 13 11/20/2023    CREATININE 0.74 11/20/2023    EGFRIFNONA 133 02/02/2022    BCR 17.6 11/20/2023    K 4.1 11/20/2023    CO2 28.0 11/20/2023    CALCIUM 10.7 (H) 11/20/2023    ALBUMIN 3.7 11/20/2023    LABIL2 1.0 04/18/2019    AST 27 11/20/2023    ALT 45 (H) 11/20/2023           ASSESSMENT    Accelerated phase CML, status post bone marrow biopsy on 11/3/2022.  Patient was on ponatinib but developed significant pancytopenia and therefore treatment has been held.  Patient is not able to tolerate ponatinib despite significantly low doses at 10 mg every other day.  We will therefore discontinue ponatinib  Sprycel 100 mg p.o. daily has been on hold secondary to rash.  Nausea vomiting secondary to Sprycel: Increased antinausea medication  Pain management issues, patient initially refusing to follow-up with her physician at [facility].  Patient has been seen by her pain physician.  She will continue follow-up with our pain physicians  Pancytopenia: Counts are slowly improving  BCR ABL kinase mutation assay  was negative  Cardiomyopathy her most recent echo shows an EF of 44% which is an improvement from 26 to 30%.  To follow-up with her cardiologist  Anxiety: Refill anxiolytics today  Pancytopenia secondary to med, CML not requiring blood transfusion now.  For now continue to observe   CML in chronic phase with no significant hematologic or molecular or cytogenetic response on   Imatinib.  ABL kinase mutational analysis was negative.  We  have represcribed to Tasigna 300 mg twice a day.  Patient has been noncompliant with treatments and ended up in the hospital with hyperleukocytosis, peripheral blood blasts.  Bone marrow biopsy suggest that she remains in chronic phase.  Continue to Tasiigna at the current doses.  Hydroxyurea has been discontinued.  Uncontrolled diabetes type 1, followed at the [facility] by Dr. Calles  Chronic anemia: Stable, multifactorial from CML, to Tasigna, hydroxyurea.  This has improved  Insomnia  Situational anxiety, not suicidal.  Continue Xanax 0.5 mg once daily  History of medical noncompliance  Pulmonary embolism: Xarelto restarted  Recent COVID-19 pneumonia  History of prolonged QTC      Plans    Continue Sprycel.  Will decrease to Sprycel 80 mg p.o. daily, patient is agreeable with this dose.  Discussed with Dr. Lerma and oral chemo pharmacist.  Refill Ativan  Urgent referral to outpatient palliative care.  Patient could benefit from discussion of goals, anxiety and depression management, insomnia management, pain management.  Mugard mouthwash for mucositis as needed  Continue Phenergan for nausea.  Change Zofran to 4 mg every 8 hours as needed for nausea to decrease risk of prolonged QT interval.    Would request for HLA matched platelet transfusions  Continue CBCs once a week to be drawn by visiting nurses.  Continue the same.  Discontinue ponatinib  Patient to establish with new PCP.  She was given numbers to Dr. Christina Stewart's office to call  Discontinue  oxycodone.  Pain management will take over her pain medications  BCR ABL kinase mutational analysis was negative  Request additional records from [facility] trazodone 25 mg due to QT prolongation  Continue to follow-up with cardiology in regards to dosing of her diuretics.  EKG reviewed.  She has slight prolongation of QT.  We will discontinue Zofran and Phenergan and use Compazine as needed for nausea.  Prescription has been sent to her pharmacy  Refill Xanax  All questions answered  Continue to follow-up with PCP per routine  Follow-up with pain management per routine  Follow-up 2 weeks with Dr. Lerma or sooner if needed  Consider PICC line placement if patient continues with treatment due to difficulty drawing lab work      I spent 40 minutes caring for Nieves on this date of service. This time includes time spent by me in the following activities:preparing for the visit, reviewing tests, obtaining and/or reviewing a separately obtained history, performing a medically appropriate examination and/or evaluation , counseling and educating the patient/family/caregiver, ordering medications, tests, or procedures, referring and communicating with other health care professionals , and documenting information in the medical record

## 2023-11-29 ENCOUNTER — OFFICE VISIT (OUTPATIENT)
Dept: ONCOLOGY | Facility: CLINIC | Age: 48
End: 2023-11-29
Payer: MEDICARE

## 2023-11-29 ENCOUNTER — SPECIALTY PHARMACY (OUTPATIENT)
Dept: PHARMACY | Facility: HOSPITAL | Age: 48
End: 2023-11-29
Payer: MEDICARE

## 2023-11-29 VITALS
SYSTOLIC BLOOD PRESSURE: 136 MMHG | HEART RATE: 111 BPM | OXYGEN SATURATION: 99 % | DIASTOLIC BLOOD PRESSURE: 79 MMHG | HEIGHT: 64 IN | BODY MASS INDEX: 24.48 KG/M2

## 2023-11-29 DIAGNOSIS — C92.10 BLAST CRISIS PHASE OF CHRONIC MYELOID LEUKEMIA: ICD-10-CM

## 2023-11-29 DIAGNOSIS — G89.3 CHRONIC PAIN DUE TO MALIGNANT NEOPLASTIC DISEASE: ICD-10-CM

## 2023-11-29 DIAGNOSIS — C95.90 LEUKEMIA NOT HAVING ACHIEVED REMISSION, UNSPECIFIED LEUKEMIA TYPE: Primary | ICD-10-CM

## 2023-11-29 DIAGNOSIS — C92.10 CML (CHRONIC MYELOCYTIC LEUKEMIA): ICD-10-CM

## 2023-11-29 DIAGNOSIS — F41.8 DEPRESSION WITH ANXIETY: Primary | ICD-10-CM

## 2023-11-29 DIAGNOSIS — F41.9 ANXIETY: ICD-10-CM

## 2023-11-29 RX ORDER — ALPRAZOLAM 0.5 MG/1
0.5 TABLET ORAL NIGHTLY PRN
Qty: 30 TABLET | Refills: 0 | Status: SHIPPED | OUTPATIENT
Start: 2023-11-29

## 2023-11-29 NOTE — PROGRESS NOTES
Re: Refills of Oral Specialty Medication - Sprycel (dasatinib)    Drug-Drug Interactions: The current medication list was reviewed and there are no relevant drug-drug interactions with the specialty medication.  Medication Allergies: The patient has NKDA  Review of Labs/Dose Adjustments: DOSE CHANGE - I reviewed the most recent note and labs. Due to side effects/rash the dose is being reduced. I sent refills as described below.  Due to mild rash, providers and pharmacy suggested pt retry 100 mg dose; however, pt was not willing to take full dose and wanted to take every other day. Discussed dose reduction with pt and importance of trying a lower dose and taking every day. Pt was reluctant, but willing to try.    Drug: Sprycel (dasatinib)  Strength: 80 mg  Directions: Take 1 tablet by mouth daily  Quantity: 30  Refills: 5  Pharmacy prescription sent to: [facility] upon MD bernard Aldridge, Pharm.D.    11/29/2023  14:12 EST

## 2023-11-30 NOTE — PROGRESS NOTES
Specialty Pharmacy Note: Sprycel (Dasatinib)    Drug: Sprycel (dasatinib)  Strength: 80 mg  Directions: Take 1 tablet by mouth daily  Quantity: 30  Refills: 5  Pharmacy prescription sent to: [facility] upon MD signature    Completed independent double check on medication order/RX.       Thanks,    Ros CALHOUN, PharmD

## 2023-12-01 ENCOUNTER — READMISSION MANAGEMENT (OUTPATIENT)
Dept: CALL CENTER | Facility: HOSPITAL | Age: 48
End: 2023-12-01
Payer: MEDICARE

## 2023-12-01 DIAGNOSIS — C92.10 BLAST CRISIS PHASE OF CHRONIC MYELOID LEUKEMIA: Primary | ICD-10-CM

## 2023-12-01 DIAGNOSIS — C95.90 LEUKEMIA NOT HAVING ACHIEVED REMISSION, UNSPECIFIED LEUKEMIA TYPE: ICD-10-CM

## 2023-12-01 NOTE — OUTREACH NOTE
Medical Week 3 Survey      Flowsheet Row Responses   [facility] patient discharged from? [facility]   Does the patient have one of the following disease processes/diagnoses(primary or secondary)? Other   Week 3 attempt successful? Yes   Call start time 1829   Call end time 1831   Discharge diagnosis Adverse effect of chemotherapy   Meds reviewed with patient/caregiver? Yes   Is the patient having any side effects they believe may be caused by any medication additions or changes? No   Does the patient have all medications ordered at discharge? Yes   Is the patient taking all medications as directed (includes completed medication regime)? Yes   Does the patient have a primary care provider?  Yes   Does the patient have an appointment with their PCP within 7 days of discharge? Yes   Has the patient kept scheduled appointments due by today? Yes   What is the Home health agency?  formerly [facility]   Has home health visited the patient within 72 hours of discharge? Yes   Psychosocial issues? No   Did the patient receive a copy of their discharge instructions? Yes   Nursing interventions Reviewed instructions with patient   What is the patient's perception of their health status since discharge? Improving   Is the patient/caregiver able to teach back signs and symptoms related to disease process for when to call PCP? Yes   Is the patient/caregiver able to teach back signs and symptoms related to disease process for when to call 911? Yes   Is the patient/caregiver able to teach back the hierarchy of who to call/visit for symptoms/problems? PCP, Specialist, Home health nurse, Urgent Care, ED, 911 Yes   Additional teach back comments states is a baseline, plans to restart chemotherapy soon   Week 3 Call Completed? Yes   Graduated Yes   Is the patient interested in additional calls from an ambulatory ? No   Would this patient benefit from a Referral to [facility] Social Work? No   Call end time 1831            Ángela Brunner  Registered Nurse

## 2023-12-04 ENCOUNTER — HOME CARE VISIT (OUTPATIENT)
Dept: HOME HEALTH SERVICES | Facility: HOME HEALTHCARE | Age: 48
End: 2023-12-04
Payer: COMMERCIAL

## 2023-12-04 ENCOUNTER — LAB REQUISITION (OUTPATIENT)
Dept: LAB | Facility: HOSPITAL | Age: 48
End: 2023-12-04
Payer: MEDICARE

## 2023-12-04 ENCOUNTER — HOME CARE VISIT (OUTPATIENT)
Dept: HOME HEALTH SERVICES | Facility: HOME HEALTHCARE | Age: 48
End: 2023-12-04
Payer: MEDICARE

## 2023-12-04 ENCOUNTER — SPECIALTY PHARMACY (OUTPATIENT)
Dept: PHARMACY | Facility: HOSPITAL | Age: 48
End: 2023-12-04
Payer: MEDICARE

## 2023-12-04 VITALS
HEART RATE: 103 BPM | DIASTOLIC BLOOD PRESSURE: 62 MMHG | OXYGEN SATURATION: 92 % | SYSTOLIC BLOOD PRESSURE: 120 MMHG | RESPIRATION RATE: 18 BRPM | TEMPERATURE: 98.4 F

## 2023-12-04 DIAGNOSIS — C92.10 CHRONIC MYELOID LEUKEMIA, BCR/ABL-POSITIVE, NOT HAVING ACHIEVED REMISSION: ICD-10-CM

## 2023-12-04 DIAGNOSIS — D64.9 ANEMIA, UNSPECIFIED TYPE: Primary | ICD-10-CM

## 2023-12-04 LAB
ALBUMIN SERPL-MCNC: 3.8 G/DL (ref 3.5–5.2)
ALBUMIN/GLOB SERPL: 1.2 G/DL
ALP SERPL-CCNC: 453 U/L (ref 39–117)
ALT SERPL W P-5'-P-CCNC: 26 U/L (ref 1–33)
ANION GAP SERPL CALCULATED.3IONS-SCNC: 12 MMOL/L (ref 5–15)
AST SERPL-CCNC: 17 U/L (ref 1–32)
BASOPHILS # BLD AUTO: 0 10*3/MM3 (ref 0–0.2)
BASOPHILS NFR BLD AUTO: 1.2 % (ref 0–1.5)
BILIRUB SERPL-MCNC: 0.7 MG/DL (ref 0–1.2)
BUN SERPL-MCNC: 18 MG/DL (ref 6–20)
BUN/CREAT SERPL: 22.8 (ref 7–25)
CALCIUM SPEC-SCNC: 10.8 MG/DL (ref 8.6–10.5)
CHLORIDE SERPL-SCNC: 101 MMOL/L (ref 98–107)
CO2 SERPL-SCNC: 26 MMOL/L (ref 22–29)
CREAT SERPL-MCNC: 0.79 MG/DL (ref 0.57–1)
DEPRECATED RDW RBC AUTO: 59.1 FL (ref 37–54)
EGFRCR SERPLBLD CKD-EPI 2021: 92.4 ML/MIN/1.73
EOSINOPHIL # BLD AUTO: 0 10*3/MM3 (ref 0–0.4)
EOSINOPHIL NFR BLD AUTO: 0.3 % (ref 0.3–6.2)
ERYTHROCYTE [DISTWIDTH] IN BLOOD BY AUTOMATED COUNT: 18.6 % (ref 12.3–15.4)
GLOBULIN UR ELPH-MCNC: 3.1 GM/DL
GLUCOSE SERPL-MCNC: 245 MG/DL (ref 65–99)
HCT VFR BLD AUTO: 23.1 % (ref 34–46.6)
HGB BLD-MCNC: 7.7 G/DL (ref 12–15.9)
LYMPHOCYTES # BLD AUTO: 1.3 10*3/MM3 (ref 0.7–3.1)
LYMPHOCYTES NFR BLD AUTO: 37.3 % (ref 19.6–45.3)
MCH RBC QN AUTO: 30.3 PG (ref 26.6–33)
MCHC RBC AUTO-ENTMCNC: 33.3 G/DL (ref 31.5–35.7)
MCV RBC AUTO: 91.2 FL (ref 79–97)
MONOCYTES # BLD AUTO: 0.6 10*3/MM3 (ref 0.1–0.9)
MONOCYTES NFR BLD AUTO: 17.4 % (ref 5–12)
NEUTROPHILS NFR BLD AUTO: 1.5 10*3/MM3 (ref 1.7–7)
NEUTROPHILS NFR BLD AUTO: 43.8 % (ref 42.7–76)
NRBC BLD AUTO-RTO: 0.3 /100 WBC (ref 0–0.2)
PLATELET # BLD AUTO: 121 10*3/MM3 (ref 140–450)
PMV BLD AUTO: 7.5 FL (ref 6–12)
POTASSIUM SERPL-SCNC: 3.9 MMOL/L (ref 3.5–5.2)
PROT SERPL-MCNC: 6.9 G/DL (ref 6–8.5)
RBC # BLD AUTO: 2.54 10*6/MM3 (ref 3.77–5.28)
SODIUM SERPL-SCNC: 139 MMOL/L (ref 136–145)
WBC NRBC COR # BLD AUTO: 3.5 10*3/MM3 (ref 3.4–10.8)

## 2023-12-04 PROCEDURE — 80053 COMPREHEN METABOLIC PANEL: CPT | Performed by: INTERNAL MEDICINE

## 2023-12-04 PROCEDURE — 85025 COMPLETE CBC W/AUTO DIFF WBC: CPT | Performed by: INTERNAL MEDICINE

## 2023-12-04 PROCEDURE — G0299 HHS/HOSPICE OF RN EA 15 MIN: HCPCS

## 2023-12-04 RX ORDER — SODIUM CHLORIDE 9 MG/ML
250 INJECTION, SOLUTION INTRAVENOUS AS NEEDED
OUTPATIENT
Start: 2023-12-04

## 2023-12-04 NOTE — PROGRESS NOTES
Received a message from Dr. Lerma to schedule a blood transfusion for tomorrow. Spoke to Zina at [facility], she said they would reach out to the pt to schedule.

## 2023-12-04 NOTE — PROGRESS NOTES
Specialty Pharmacy Patient Management Program  Oncology Refill Outreach      Nieves is a 48 y.o. female contacted today regarding refills of Dasatinib (Sprycel) specialty medication(s).    Specialty medication(s) and dose(s) confirmed: yes, dose was changed to 80mg  Changes to medications: no  Changes to insurance: no  Other medications being refilled: None      Refill Questions      Flowsheet Row Most Recent Value   Changes to allergies? No   Changes to medications? No   New conditions since last clinic visit No   Unplanned office visit, urgent care, ED, or hospital admission in the last 4 weeks  Yes   How does patient/caregiver feel medication is working? Fair   Financial problems or insurance changes  No   Since the previous refill, were any specialty medication doses or scheduled injections missed or delayed?  Yes   If yes, please provide the amount Not sure   Why were doses missed? Patient in hospital and was sick   Does this patient require a clinical escalation to a pharmacist? No            Delivery Questions      Flowsheet Row Most Recent Value   Delivery method Other (Comment)  [Beeline ]   Delivery address correct? Yes  [Sent to temporary address]   Delivery phone number [phone]   Number of medications in delivery 1   Medication(s) being filled and delivered Dasatinib   Doses left of specialty medications 0- new dose   Is there any medication that is due not being filled? No   Supplies needed? No supplies needed   Cooler needed? No   Do any medications need mixed or dated? No   Copay form of payment Payment plan already set up   Additional comments NA   Questions or concerns for the pharmacist? No   Explain any questions or concerns for the pharmacist NA   Are any medications first time fills? Yes  [For this new dose]            Ship to temporary address on Tuesday 12/5/23 to arrive on Wednesday 12/6/23.      Follow-up: 3 week(s)     Jyotsna Anton, Pharmacy Technician  Specialty Pharmacy  Technician   12/4/2023   10:16 EST

## 2023-12-05 ENCOUNTER — HOSPITAL ENCOUNTER (OUTPATIENT)
Dept: INFUSION THERAPY | Facility: HOSPITAL | Age: 48
Discharge: HOME | End: 2023-12-05
Payer: MEDICARE

## 2023-12-05 DIAGNOSIS — C95.90 LEUKEMIA NOT HAVING ACHIEVED REMISSION, UNSPECIFIED LEUKEMIA TYPE: Primary | ICD-10-CM

## 2023-12-05 DIAGNOSIS — D64.9 ANEMIA, UNSPECIFIED TYPE: Primary | ICD-10-CM

## 2023-12-05 DIAGNOSIS — D61.818 PANCYTOPENIA: ICD-10-CM

## 2023-12-05 DIAGNOSIS — C95.90 LEUKEMIA NOT HAVING ACHIEVED REMISSION, UNSPECIFIED LEUKEMIA TYPE: ICD-10-CM

## 2023-12-05 LAB
BASOPHILS # BLD MANUAL: 0.08 10*3/MM3 (ref 0–0.2)
BASOPHILS NFR BLD MANUAL: 1 % (ref 0–1.5)
BLASTS NFR BLD MANUAL: 2 % (ref 0–0)
DEPRECATED RDW RBC AUTO: 61.3 FL (ref 37–54)
ERYTHROCYTE [DISTWIDTH] IN BLOOD BY AUTOMATED COUNT: 18.7 % (ref 12.3–15.4)
HCT VFR BLD AUTO: 24.4 % (ref 34–46.6)
HGB BLD-MCNC: 8.2 G/DL (ref 12–15.9)
LYMPHOCYTES # BLD MANUAL: 2.89 10*3/MM3 (ref 0.7–3.1)
LYMPHOCYTES NFR BLD MANUAL: 13 % (ref 5–12)
MCH RBC QN AUTO: 29.8 PG (ref 26.6–33)
MCHC RBC AUTO-ENTMCNC: 33.4 G/DL (ref 31.5–35.7)
MCV RBC AUTO: 89.2 FL (ref 79–97)
MICROCYTES BLD QL: ABNORMAL
MONOCYTES # BLD: 0.99 10*3/MM3 (ref 0.1–0.9)
NEUTROPHILS # BLD AUTO: 3.5 10*3/MM3 (ref 1.7–7)
NEUTROPHILS NFR BLD MANUAL: 42 % (ref 42.7–76)
NEUTS BAND NFR BLD MANUAL: 4 % (ref 0–5)
PLATELET # BLD AUTO: 145 10*3/MM3 (ref 140–450)
PMV BLD AUTO: 8.9 FL (ref 6–12)
POLYCHROMASIA BLD QL SMEAR: ABNORMAL
RBC # BLD AUTO: 2.74 10*6/MM3 (ref 3.77–5.28)
SCAN SLIDE: NORMAL
SMALL PLATELETS BLD QL SMEAR: ADEQUATE
VARIANT LYMPHS NFR BLD MANUAL: 38 % (ref 19.6–45.3)
WBC MORPH BLD: NORMAL
WBC NRBC COR # BLD AUTO: 7.6 10*3/MM3 (ref 3.4–10.8)

## 2023-12-05 PROCEDURE — 36415 COLL VENOUS BLD VENIPUNCTURE: CPT

## 2023-12-05 PROCEDURE — G0463 HOSPITAL OUTPT CLINIC VISIT: HCPCS

## 2023-12-05 PROCEDURE — 85025 COMPLETE CBC W/AUTO DIFF WBC: CPT | Performed by: INTERNAL MEDICINE

## 2023-12-05 PROCEDURE — 85007 BL SMEAR W/DIFF WBC COUNT: CPT | Performed by: INTERNAL MEDICINE

## 2023-12-05 NOTE — PROGRESS NOTES
Patients Hgb did not meet order criteria for blood transfusion today per Dr. Lerma. Patient is aware. Blood was obtained from Left hand.

## 2023-12-07 ENCOUNTER — SPECIALTY PHARMACY (OUTPATIENT)
Dept: PHARMACY | Facility: HOSPITAL | Age: 48
End: 2023-12-07
Payer: MEDICARE

## 2023-12-07 PROCEDURE — G0463 HOSPITAL OUTPT CLINIC VISIT: HCPCS

## 2023-12-08 ENCOUNTER — TELEPHONE (OUTPATIENT)
Dept: ONCOLOGY | Facility: CLINIC | Age: 48
End: 2023-12-08
Payer: MEDICARE

## 2023-12-08 DIAGNOSIS — E83.52 HYPERCALCEMIA: Primary | ICD-10-CM

## 2023-12-08 NOTE — TELEPHONE ENCOUNTER
Left message on identifying VM letting pt know that her calcium level was elevated and that she should hold any calcium containing vitamins and stay well hydrated. I also let her know that we will recheck her labs next week. Callback number left.

## 2023-12-08 NOTE — TELEPHONE ENCOUNTER
----- Message from JP Varela sent at 12/7/2023  4:16 PM EST -----  Please ensure she is not on calcium supplement or a calcium containing MVI.  Ask her to keep well hydrated.  Recheck on next weekly labs also check a vitamin d level, pth and an ionized calcium. Ty

## 2023-12-11 ENCOUNTER — HOME CARE VISIT (OUTPATIENT)
Dept: HOME HEALTH SERVICES | Facility: HOME HEALTHCARE | Age: 48
End: 2023-12-11
Payer: MEDICARE

## 2023-12-11 VITALS
DIASTOLIC BLOOD PRESSURE: 82 MMHG | OXYGEN SATURATION: 95 % | SYSTOLIC BLOOD PRESSURE: 160 MMHG | RESPIRATION RATE: 20 BRPM | HEART RATE: 90 BPM | TEMPERATURE: 98.2 F

## 2023-12-11 PROCEDURE — G0299 HHS/HOSPICE OF RN EA 15 MIN: HCPCS

## 2023-12-12 NOTE — HOME HEALTH
Patient stated that she has been vomiting for over four days. SN advised pt and caregiver that she needed to be seen in the ED related to dehydration. patients skin on her hands was sticking together and she was having some confusion.   Patient had several vomiting episodes with only bile coming up. Patient was drinking water to have something to vomit.     SN strongly encouraged patient and  caregiver to go to the ED. Patient refused.

## 2023-12-18 ENCOUNTER — HOME CARE VISIT (OUTPATIENT)
Dept: HOME HEALTH SERVICES | Facility: HOME HEALTHCARE | Age: 48
End: 2023-12-18
Payer: COMMERCIAL

## 2023-12-18 ENCOUNTER — LAB REQUISITION (OUTPATIENT)
Dept: LAB | Facility: HOSPITAL | Age: 48
End: 2023-12-18
Payer: MEDICARE

## 2023-12-18 VITALS
OXYGEN SATURATION: 96 % | TEMPERATURE: 98.5 F | SYSTOLIC BLOOD PRESSURE: 128 MMHG | HEART RATE: 96 BPM | RESPIRATION RATE: 18 BRPM | DIASTOLIC BLOOD PRESSURE: 62 MMHG

## 2023-12-18 DIAGNOSIS — C92.10 CHRONIC MYELOID LEUKEMIA, BCR/ABL-POSITIVE, NOT HAVING ACHIEVED REMISSION: ICD-10-CM

## 2023-12-18 LAB
ANISOCYTOSIS BLD QL: ABNORMAL
BASOPHILS # BLD MANUAL: 0.14 10*3/MM3 (ref 0–0.2)
BASOPHILS NFR BLD MANUAL: 2 % (ref 0–1.5)
DEPRECATED RDW RBC AUTO: 53.8 FL (ref 37–54)
ERYTHROCYTE [DISTWIDTH] IN BLOOD BY AUTOMATED COUNT: 17.6 % (ref 12.3–15.4)
HCT VFR BLD AUTO: 24.8 % (ref 34–46.6)
HGB BLD-MCNC: 8.3 G/DL (ref 12–15.9)
LYMPHOCYTES # BLD MANUAL: 2.13 10*3/MM3 (ref 0.7–3.1)
LYMPHOCYTES NFR BLD MANUAL: 8 % (ref 5–12)
MCH RBC QN AUTO: 29.4 PG (ref 26.6–33)
MCHC RBC AUTO-ENTMCNC: 33.4 G/DL (ref 31.5–35.7)
MCV RBC AUTO: 88 FL (ref 79–97)
MONOCYTES # BLD: 0.57 10*3/MM3 (ref 0.1–0.9)
NEUTROPHILS # BLD AUTO: 4.26 10*3/MM3 (ref 1.7–7)
NEUTROPHILS NFR BLD MANUAL: 58 % (ref 42.7–76)
NEUTS BAND NFR BLD MANUAL: 2 % (ref 0–5)
PLAT MORPH BLD: NORMAL
PLATELET # BLD AUTO: 258 10*3/MM3 (ref 140–450)
PMV BLD AUTO: 8 FL (ref 6–12)
RBC # BLD AUTO: 2.82 10*6/MM3 (ref 3.77–5.28)
SCAN SLIDE: NORMAL
VARIANT LYMPHS NFR BLD MANUAL: 30 % (ref 19.6–45.3)
WBC MORPH BLD: NORMAL
WBC NRBC COR # BLD AUTO: 7.1 10*3/MM3 (ref 3.4–10.8)

## 2023-12-18 PROCEDURE — 85025 COMPLETE CBC W/AUTO DIFF WBC: CPT | Performed by: INTERNAL MEDICINE

## 2023-12-18 PROCEDURE — G0299 HHS/HOSPICE OF RN EA 15 MIN: HCPCS

## 2023-12-19 ENCOUNTER — SPECIALTY PHARMACY (OUTPATIENT)
Dept: PHARMACY | Facility: HOSPITAL | Age: 48
End: 2023-12-19
Payer: MEDICARE

## 2023-12-19 NOTE — HOME HEALTH
Routine Visit Note:    Skill/education provided: cardiopulmonary assessment, med education, pain assessment, fall prevention education, labs per md order    Patient/caregiver response: pt stated understanding    Plan for next visit: cardiopulmonary assessment, med education, pain assessment, fall prevention education, labs per md order    Other pertinent info: monitor for vomiting

## 2023-12-27 ENCOUNTER — HOME CARE VISIT (OUTPATIENT)
Dept: HOME HEALTH SERVICES | Facility: HOME HEALTHCARE | Age: 48
End: 2023-12-27
Payer: MEDICARE

## 2023-12-27 ENCOUNTER — LAB REQUISITION (OUTPATIENT)
Dept: LAB | Facility: HOSPITAL | Age: 48
End: 2023-12-27
Payer: MEDICARE

## 2023-12-27 ENCOUNTER — HOME CARE VISIT (OUTPATIENT)
Dept: HOME HEALTH SERVICES | Facility: HOME HEALTHCARE | Age: 48
End: 2023-12-27
Payer: COMMERCIAL

## 2023-12-27 VITALS
OXYGEN SATURATION: 96 % | DIASTOLIC BLOOD PRESSURE: 66 MMHG | HEART RATE: 110 BPM | BODY MASS INDEX: 21.63 KG/M2 | TEMPERATURE: 98.4 F | SYSTOLIC BLOOD PRESSURE: 144 MMHG | WEIGHT: 126 LBS | RESPIRATION RATE: 18 BRPM

## 2023-12-27 DIAGNOSIS — C92.10 CHRONIC MYELOID LEUKEMIA, BCR/ABL-POSITIVE, NOT HAVING ACHIEVED REMISSION: ICD-10-CM

## 2023-12-27 DIAGNOSIS — F41.8 DEPRESSION WITH ANXIETY: ICD-10-CM

## 2023-12-27 LAB
ANISOCYTOSIS BLD QL: ABNORMAL
BASOPHILS # BLD MANUAL: 0.05 10*3/MM3 (ref 0–0.2)
BASOPHILS NFR BLD MANUAL: 1 % (ref 0–1.5)
DEPRECATED RDW RBC AUTO: 56 FL (ref 37–54)
ERYTHROCYTE [DISTWIDTH] IN BLOOD BY AUTOMATED COUNT: 17.7 % (ref 12.3–15.4)
HCT VFR BLD AUTO: 25.9 % (ref 34–46.6)
HGB BLD-MCNC: 8.5 G/DL (ref 12–15.9)
LYMPHOCYTES # BLD MANUAL: 2.54 10*3/MM3 (ref 0.7–3.1)
LYMPHOCYTES NFR BLD MANUAL: 9 % (ref 5–12)
MCH RBC QN AUTO: 29.3 PG (ref 26.6–33)
MCHC RBC AUTO-ENTMCNC: 32.7 G/DL (ref 31.5–35.7)
MCV RBC AUTO: 89.6 FL (ref 79–97)
METAMYELOCYTES NFR BLD MANUAL: 2 % (ref 0–0)
MONOCYTES # BLD: 0.43 10*3/MM3 (ref 0.1–0.9)
NEUTROPHILS # BLD AUTO: 1.68 10*3/MM3 (ref 1.7–7)
NEUTROPHILS NFR BLD MANUAL: 35 % (ref 42.7–76)
NRBC SPEC MANUAL: 1 /100 WBC (ref 0–0.2)
PLATELET # BLD AUTO: 145 10*3/MM3 (ref 140–450)
PMV BLD AUTO: 8 FL (ref 6–12)
POLYCHROMASIA BLD QL SMEAR: ABNORMAL
RBC # BLD AUTO: 2.89 10*6/MM3 (ref 3.77–5.28)
SCAN SLIDE: NORMAL
SMALL PLATELETS BLD QL SMEAR: ABNORMAL
VARIANT LYMPHS NFR BLD MANUAL: 53 % (ref 19.6–45.3)
WBC MORPH BLD: NORMAL
WBC NRBC COR # BLD AUTO: 4.8 10*3/MM3 (ref 3.4–10.8)

## 2023-12-27 PROCEDURE — G0299 HHS/HOSPICE OF RN EA 15 MIN: HCPCS

## 2023-12-27 PROCEDURE — 85025 COMPLETE CBC W/AUTO DIFF WBC: CPT | Performed by: INTERNAL MEDICINE

## 2023-12-27 RX ORDER — FUROSEMIDE 40 MG/1
40 TABLET ORAL DAILY
Status: CANCELLED | OUTPATIENT
Start: 2023-12-27

## 2023-12-27 RX ORDER — TIZANIDINE 4 MG/1
4 TABLET ORAL DAILY
Status: CANCELLED | OUTPATIENT
Start: 2023-12-27

## 2023-12-28 ENCOUNTER — SPECIALTY PHARMACY (OUTPATIENT)
Dept: PHARMACY | Facility: HOSPITAL | Age: 48
End: 2023-12-28
Payer: MEDICARE

## 2024-01-02 ENCOUNTER — SPECIALTY PHARMACY (OUTPATIENT)
Dept: PHARMACY | Facility: HOSPITAL | Age: 49
End: 2024-01-02
Payer: MEDICARE

## 2024-01-02 RX ORDER — ACETAMINOPHEN 325 MG/1
650 TABLET ORAL EVERY 4 HOURS PRN
Qty: 60 TABLET | Refills: 3 | Status: SHIPPED | OUTPATIENT
Start: 2024-01-02

## 2024-01-02 RX ORDER — MIRTAZAPINE 15 MG/1
15 TABLET, FILM COATED ORAL
Qty: 30 TABLET | Refills: 2 | Status: SHIPPED | OUTPATIENT
Start: 2024-01-02

## 2024-01-02 RX ORDER — PROCHLORPERAZINE 25 MG
25 SUPPOSITORY, RECTAL RECTAL EVERY 12 HOURS PRN
Qty: 10 SUPPOSITORY | Refills: 2 | Status: SHIPPED | OUTPATIENT
Start: 2024-01-02

## 2024-01-02 NOTE — PROGRESS NOTES
Specialty Pharmacy Patient Management Program  Oncology Refill Outreach      Nieves is a 48 y.o. female contacted today regarding refills of Dasatinib (Sprycel) specialty medication(s).    Specialty medication(s) and dose(s) confirmed: yes  Changes to medications: no  Changes to insurance: no  Other medications being refilled: None      Refill Questions      Flowsheet Row Most Recent Value   Changes to allergies? No   Changes to medications? No   New conditions or infections since last clinic visit No   Unplanned office visit, urgent care, ED, or hospital admission in the last 4 weeks  No   How does patient/caregiver feel medication is working? Fair   Financial problems or insurance changes  No   Since the previous refill, were any specialty medication doses or scheduled injections missed or delayed?  Yes   If yes, please provide the amount around 2 weeks   Why were doses missed? Get sick while taking medication   Does this patient require a clinical escalation to a pharmacist? Yes            Delivery Questions      Flowsheet Row Most Recent Value   Delivery method Beeline   Delivery address verified with patient/caregiver? Yes   Delivery address Prescriptions   Number of medications in delivery 1   Medication(s) being filled and delivered Dasatinib   Doses left of specialty medications around 15 days   Copay verified? Yes   Copay amount $0   Copay form of payment No copayment ($0)          Ship on Wednesday 1/3/24 to arrive on Thursday 1/4/23 to address provided.      Follow-up: 1 month(s)     Jyotsna Anton, Pharmacy Technician  Specialty Pharmacy Technician   1/2/2024   15:31 EST

## 2024-01-03 ENCOUNTER — HOME CARE VISIT (OUTPATIENT)
Dept: HOME HEALTH SERVICES | Facility: HOME HEALTHCARE | Age: 49
End: 2024-01-03
Payer: COMMERCIAL

## 2024-01-03 VITALS
HEART RATE: 81 BPM | DIASTOLIC BLOOD PRESSURE: 58 MMHG | SYSTOLIC BLOOD PRESSURE: 138 MMHG | TEMPERATURE: 98.5 F | RESPIRATION RATE: 18 BRPM | OXYGEN SATURATION: 92 %

## 2024-01-03 PROCEDURE — G0299 HHS/HOSPICE OF RN EA 15 MIN: HCPCS

## 2024-01-03 NOTE — HOME HEALTH
Routine Visit Note:    Skill/education provided: cardiopulmonary assessment,med education, pain assessment, labs per md order    Patient/caregiver response: pt stated understanding    Plan for next visit: cardiopulmonary assessment, med education, pain assessment, labs per md order     Other pertinent info: monitor for order for picc line to be placed

## 2024-01-03 NOTE — Clinical Note
SN is unable to get blood draw. Patient is in dire need of picc line or port for weekly labs. Please send order to [facility] for placement if you deem necessary. Patient refuses to go to ambulatory care for labs to be drawn.

## 2024-01-08 ENCOUNTER — HOME CARE VISIT (OUTPATIENT)
Dept: HOME HEALTH SERVICES | Facility: HOME HEALTHCARE | Age: 49
End: 2024-01-08
Payer: COMMERCIAL

## 2024-01-08 VITALS
RESPIRATION RATE: 18 BRPM | TEMPERATURE: 98.7 F | SYSTOLIC BLOOD PRESSURE: 138 MMHG | DIASTOLIC BLOOD PRESSURE: 60 MMHG | OXYGEN SATURATION: 96 % | HEART RATE: 111 BPM

## 2024-01-08 PROCEDURE — G0299 HHS/HOSPICE OF RN EA 15 MIN: HCPCS

## 2024-01-08 NOTE — HOME HEALTH
Routine Visit Note:    Skill/education provided: cardiopulmonary assessment, med education, pain assessment, fall prevention education, attempted labs     Patient/caregiver response: pt stated understanding    Plan for next visit: cardiopulmonary assessment, med education,pain assessment, fall prevention education, labs per md order     Other pertinent info: monitor for new order for central line

## 2024-01-15 ENCOUNTER — HOME CARE VISIT (OUTPATIENT)
Dept: HOME HEALTH SERVICES | Facility: HOME HEALTHCARE | Age: 49
End: 2024-01-15
Payer: COMMERCIAL

## 2024-01-16 ENCOUNTER — HOME CARE VISIT (OUTPATIENT)
Dept: HOME HEALTH SERVICES | Facility: HOME HEALTHCARE | Age: 49
End: 2024-01-16
Payer: COMMERCIAL

## 2024-01-22 ENCOUNTER — HOME CARE VISIT (OUTPATIENT)
Dept: HOME HEALTH SERVICES | Facility: HOME HEALTHCARE | Age: 49
End: 2024-01-22
Payer: COMMERCIAL

## 2024-01-22 NOTE — PROGRESS NOTES
[facility] Medicine Services Daily Progress Note    Patient Name: Nieves Mc  : 1975  MRN: [id number]  Primary Care Physician:  Huoston Oro MD  Date of admission: 2022      Subjective      Chief Complaint: Shortness of breath.      Patient Reports:            2022.  Patient was seen and examined.  Patient complains of musculoskeletal pain.  Patient complained of cough and generalized weakness.        2022.  Patient was seen and examined.  Patient reported slight improvement in her symptoms.  No overnight events noted.          2022.  Patient was seen and examined.  Patient reported no overnight events.        Review of Systems   HENT: Negative.    Eyes: Negative.    Cardiovascular: Negative.    Respiratory: Negative for shortness of breath.    Endocrine: Negative.    Hematologic/Lymphatic: Negative.    Skin: Negative.    Musculoskeletal: Positive for joint pain. Negative for muscle cramps.   Gastrointestinal: Negative.    Genitourinary: Negative.    Neurological: Positive for weakness.   Psychiatric/Behavioral: Negative.    Allergic/Immunologic: Negative.             Objective      Vitals:   Temp:  [97.2 °F (36.2 °C)-98.6 °F (37 °C)] 98.6 °F (37 °C)  Heart Rate:  [] 104  Resp:  [14-16] 14  BP: (111-132)/(66-78) 119/78  Flow (L/min):  [2-3] 3    Physical Exam  Vitals and nursing note reviewed.   Constitutional:       Appearance: She is ill-appearing.      Comments: Patient is lying comfortably in bed and in no acute distress.   HENT:      Head: Normocephalic and atraumatic.      Nose: Nose normal. No congestion or rhinorrhea.      Mouth/Throat:      Mouth: Mucous membranes are moist.      Pharynx: Oropharynx is clear. No oropharyngeal exudate or posterior oropharyngeal erythema.   Eyes:      Pupils: Pupils are equal, round, and reactive to light.   Cardiovascular:      Pulses: Normal pulses.      Heart sounds: Normal heart sounds. No murmur  Called pt - Eliquis is $700  Will check on coupon and call pt back   heard.    No friction rub. No gallop.      Comments: S1 and S2 present.  Positive tachycardia.  Pulmonary:      Effort: No respiratory distress.      Breath sounds: No wheezing or rhonchi.      Comments: Decreased air entry bilaterally.  Poor air movement.  Chest:      Chest wall: No tenderness.   Abdominal:      General: Abdomen is flat. Bowel sounds are normal. There is no distension.      Palpations: Abdomen is soft.      Tenderness: There is no right CVA tenderness.   Musculoskeletal:         General: No swelling, tenderness, deformity or signs of injury.      Cervical back: No tenderness.      Right lower leg: No edema.      Left lower leg: No edema.   Skin:     Capillary Refill: Capillary refill takes less than 2 seconds.      Coloration: Skin is not jaundiced.      Findings: No bruising, lesion or rash.   Neurological:      Mental Status: She is alert.      Comments: No facial asymmetry noted.  Gait and station not tested.   Psychiatric:         Mood and Affect: Mood normal.         Behavior: Behavior normal.         Thought Content: Thought content normal.         Judgment: Judgment normal.               Result Review    Result Review:  I have personally reviewed the results from the time of this admission to 7/2/2022 18:11 EDT and agree with these findings:  [x]  Laboratory  [x]  Microbiology  [x]  Radiology  []  EKG/Telemetry   []  Cardiology/Vascular   []  Pathology  []  Old records  []  Other:  Most notable findings include:           Assessment & Plan    From previous notes and with minor updates.  Brief Patient Summary:    Patient is a 46 y.o. female with past medical history of type 2 diabetes mellitus, CML, depression with anxiety, hypertension, chronic pain syndrome, COVID-19 virus infection and  prior PE who presented to [facility] on 6/29/2022 complaining of shortness of breath and chest pain.  Patient reports having worsening shortness of breath and cough, particularly over the last  week to week and a half.  She mentioned associated pleuritic chest pain and persistent hot flashes.  She reports she has been trying to take her medications as prescribed. However, when she tries to eat or drink, it just comes right back up.  She states her symptoms are similar to when she was last in the hospital. Of note, patient recently hospitalized from 6/1/2022-6/10/2022 for management of acute hypoxic respiratory failure in the setting of TRALI and CML.  She is on nilotinib (Tasigna) for her CML, but states it has not been working. She is scheduled to f/u with oncology in the clinic tomorrow for follow-up, but her symptoms were too bad so she came to the hospital for further assessment.  Patient was diagnosed with acute systolic heart failure and a blast crisis.  Cardiology and the oncology consults were completed.       citalopram, 10 mg, Oral, Daily  furosemide, 20 mg, Intravenous, BID  gabapentin, 300 mg, Oral, TID  guaiFENesin, 600 mg, Oral, Q12H  hydroxyurea, 1,000 mg, Oral, BID  insulin glargine, 25 Units, Subcutaneous, Daily  insulin lispro, 0-9 Units, Subcutaneous, 4x Daily With Meals & Nightly  losartan, 50 mg, Oral, Q24H  metoprolol succinate XL, 25 mg, Oral, Daily  rivaroxaban, 20 mg, Oral, Daily With Dinner  sodium chloride, 10 mL, Intravenous, Q12H  traZODone, 50 mg, Oral, Nightly             Active Hospital Problems:  Active Hospital Problems    Diagnosis    • Acute systolic CHF (congestive heart failure) (HCC)  Treat with Lasix.  Cardiology is following.        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    Hypertension.  Treat with losartan.    Pulmonary embolism.  Treat with Xarelto.        • Moderate malnutrition ([facility])  Completes nutrition consult.    Chronic pain syndrome.  Treat with pain control, Roxicodone as needed.      • Type 2 diabetes mellitus with diabetic polyneuropathy, with long-term current use of insulin (HCC)  Treat with  insulin therapy.      • Depression with anxiety  Treat with citalopram.      • History of pulmonary embolism  Continue anticoagulation.        • CML (chronic myelocytic leukemia) (HCC)  Follow oncology recommendations.          Continue appropriate patient's home medications for other chronic medical conditions.  Continue the present level of care.  Patient and family agreed with the plan of care.      DVT prophylaxis:  Medical DVT prophylaxis orders are present.    CODE STATUS:    Code Status (Patient has no pulse and is not breathing): CPR (Attempt to Resuscitate)  Medical Interventions (Patient has pulse or is breathing): Full Support      Disposition: Pending patient's clinical improvement.    This patient has been examined wearing appropriate Personal Protective Equipment and discussed with hospital infection control department, [facility], infectious disease specialist and pulmonologist. 07/02/22      Electronically signed by Braden Huang MD, FACP, 07/02/22, 18:11 EDT.      [facility] Hospitalist Team

## 2024-01-23 ENCOUNTER — SPECIALTY PHARMACY (OUTPATIENT)
Dept: PHARMACY | Facility: HOSPITAL | Age: 49
End: 2024-01-23
Payer: MEDICARE

## 2024-01-23 NOTE — PROGRESS NOTES
Specialty Pharmacy Note: Sprycel (dasatinib)    Spoke to pt via phone. She states medication does give her N/V. She takes her ondansetron 30-60 minutes before the Sprycel and then again PRN afterwards. She says she takes medication with jello only. Suggested she try and take medication with a larger meal as its suggested to take with food to minimize GI upset. Pt verbalized understanding and says she will try that. Discussed with pt that she is also due for labs and asked if she is able to come in this week for labs; however, pt states she is currently residing in [place] for the week and will be in [place] this coming weekend. Reminded her of lab and MD appt on Monday, 1/29/24. Pt had requested a refill of her alprazolam and message was sent to provider's nurse. Pt had no other questions or concerns this time.      Thank you,  Amy Aldridge, Pharm.D.

## 2024-01-26 ENCOUNTER — HOME CARE VISIT (OUTPATIENT)
Dept: HOME HEALTH SERVICES | Facility: HOME HEALTHCARE | Age: 49
End: 2024-01-26
Payer: COMMERCIAL

## 2024-01-26 ENCOUNTER — LAB REQUISITION (OUTPATIENT)
Dept: LAB | Facility: HOSPITAL | Age: 49
End: 2024-01-26
Payer: MEDICARE

## 2024-01-26 DIAGNOSIS — C92.10 CHRONIC MYELOID LEUKEMIA, BCR/ABL-POSITIVE, NOT HAVING ACHIEVED REMISSION: ICD-10-CM

## 2024-01-26 LAB
BASOPHILS # BLD MANUAL: 0.31 10*3/MM3 (ref 0–0.2)
BASOPHILS NFR BLD MANUAL: 3 % (ref 0–1.5)
DEPRECATED RDW RBC AUTO: 49 FL (ref 37–54)
EOSINOPHIL # BLD MANUAL: 0.1 10*3/MM3 (ref 0–0.4)
EOSINOPHIL NFR BLD MANUAL: 1 % (ref 0.3–6.2)
ERYTHROCYTE [DISTWIDTH] IN BLOOD BY AUTOMATED COUNT: 15.7 % (ref 12.3–15.4)
HCT VFR BLD AUTO: 28.4 % (ref 34–46.6)
HGB BLD-MCNC: 9.3 G/DL (ref 12–15.9)
LYMPHOCYTES # BLD MANUAL: 3.19 10*3/MM3 (ref 0.7–3.1)
LYMPHOCYTES NFR BLD MANUAL: 15 % (ref 5–12)
MCH RBC QN AUTO: 28.9 PG (ref 26.6–33)
MCHC RBC AUTO-ENTMCNC: 32.9 G/DL (ref 31.5–35.7)
MCV RBC AUTO: 87.8 FL (ref 79–97)
METAMYELOCYTES NFR BLD MANUAL: 3 % (ref 0–0)
MONOCYTES # BLD: 1.55 10*3/MM3 (ref 0.1–0.9)
MYELOCYTES NFR BLD MANUAL: 1 % (ref 0–0)
NEUTROPHILS # BLD AUTO: 4.74 10*3/MM3 (ref 1.7–7)
NEUTROPHILS NFR BLD MANUAL: 42 % (ref 42.7–76)
NEUTS BAND NFR BLD MANUAL: 4 % (ref 0–5)
PLAT MORPH BLD: NORMAL
PLATELET # BLD AUTO: 440 10*3/MM3 (ref 140–450)
PMV BLD AUTO: 8.6 FL (ref 6–12)
RBC # BLD AUTO: 3.23 10*6/MM3 (ref 3.77–5.28)
RBC MORPH BLD: NORMAL
SCAN SLIDE: NORMAL
VARIANT LYMPHS NFR BLD MANUAL: 31 % (ref 19.6–45.3)
WBC MORPH BLD: NORMAL
WBC NRBC COR # BLD AUTO: 10.3 10*3/MM3 (ref 3.4–10.8)

## 2024-01-26 PROCEDURE — G0299 HHS/HOSPICE OF RN EA 15 MIN: HCPCS

## 2024-01-26 PROCEDURE — 85025 COMPLETE CBC W/AUTO DIFF WBC: CPT | Performed by: INTERNAL MEDICINE

## 2024-01-27 ENCOUNTER — HOME CARE VISIT (OUTPATIENT)
Dept: HOME HEALTH SERVICES | Facility: HOME HEALTHCARE | Age: 49
End: 2024-01-27
Payer: COMMERCIAL

## 2024-01-27 VITALS
DIASTOLIC BLOOD PRESSURE: 66 MMHG | HEART RATE: 78 BPM | OXYGEN SATURATION: 99 % | SYSTOLIC BLOOD PRESSURE: 126 MMHG | RESPIRATION RATE: 17 BRPM | TEMPERATURE: 98 F

## 2024-01-27 NOTE — HOME HEALTH
60 Day Summary  Home Health need continues for: DIABETES EDUCATION AND MANAGEMENT  Primary diagnoses/co-morbidities/recent procedures in past 60 days that impact current episode: NA  Current level of functional ability: MODERATE ASSISTANCE  Homebound status and living arrangements: CURRENTLY LIVING IN A PAY BY MONTH/ WEEK MOTEL WITH DAUGHTER. HOMEBOUND DUE TO WEAKNESS, AMBULATION REQUIRES ASSISTANCE, LIMITED ENDURANCE, POOR COORDINATION, DIFFICULTY AMBULATING, GAIT LIMITED TO HOUSEHOLD DISTANCES, IMPAIRED DRIVING ABILITY, MECHANICAL ASSISITANCE, FALL RISK, AND IMPAIRED GAIT.      Goals accomplished and/or measurable progress toward unmet goals in past 60 days: NOT MET  Focus of care for next 60 days for each discipline ordered: SN  Skin integrity/wound status: NA  Estimated date when home care services will end 2/24/24  SDOH changes/barriers (i.e. Caregiver availability, social isolation, environment, income, transportation access, food insecurity etc.)NA  Need for MSW referral? N  Plan for next visit LAB DRAW, ENDOCRINE ASSESSMENT, CARDIOPULMONARY ASSESSMENT, GASTROINTESTINAL ASSESSMENT, SKIN ASSESSMENT, SAFETY ASSESSMENT, PAIN ASSESSMENT, MEDICATION ASSESSMENT

## 2024-01-29 ENCOUNTER — LAB (OUTPATIENT)
Dept: LAB | Facility: HOSPITAL | Age: 49
End: 2024-01-29
Payer: MEDICARE

## 2024-01-29 ENCOUNTER — HOME CARE VISIT (OUTPATIENT)
Dept: HOME HEALTH SERVICES | Facility: HOME HEALTHCARE | Age: 49
End: 2024-01-29
Payer: COMMERCIAL

## 2024-01-29 ENCOUNTER — OFFICE VISIT (OUTPATIENT)
Dept: ONCOLOGY | Facility: CLINIC | Age: 49
End: 2024-01-29
Payer: MEDICARE

## 2024-01-29 ENCOUNTER — SPECIALTY PHARMACY (OUTPATIENT)
Dept: PHARMACY | Facility: HOSPITAL | Age: 49
End: 2024-01-29
Payer: MEDICARE

## 2024-01-29 VITALS
WEIGHT: 159 LBS | RESPIRATION RATE: 18 BRPM | DIASTOLIC BLOOD PRESSURE: 88 MMHG | OXYGEN SATURATION: 98 % | SYSTOLIC BLOOD PRESSURE: 150 MMHG | BODY MASS INDEX: 27.14 KG/M2 | HEART RATE: 121 BPM | HEIGHT: 64 IN | TEMPERATURE: 98 F

## 2024-01-29 DIAGNOSIS — C92.10 CML (CHRONIC MYELOCYTIC LEUKEMIA): ICD-10-CM

## 2024-01-29 DIAGNOSIS — K31.84 GASTROPARESIS: ICD-10-CM

## 2024-01-29 DIAGNOSIS — F41.9 ANXIETY: ICD-10-CM

## 2024-01-29 DIAGNOSIS — D64.9 ANEMIA, UNSPECIFIED TYPE: Primary | ICD-10-CM

## 2024-01-29 DIAGNOSIS — D64.9 ANEMIA, UNSPECIFIED TYPE: ICD-10-CM

## 2024-01-29 DIAGNOSIS — Z78.9 POOR INTRAVENOUS ACCESS: ICD-10-CM

## 2024-01-29 LAB
ALBUMIN SERPL-MCNC: 4.4 G/DL (ref 3.5–5.2)
ALBUMIN/GLOB SERPL: 1.4 G/DL
ALP SERPL-CCNC: 297 U/L (ref 39–117)
ALT SERPL W P-5'-P-CCNC: 43 U/L (ref 1–33)
ANION GAP SERPL CALCULATED.3IONS-SCNC: 11 MMOL/L (ref 5–15)
AST SERPL-CCNC: 24 U/L (ref 1–32)
BASOPHILS # BLD AUTO: 0.49 10*3/MM3 (ref 0–0.2)
BASOPHILS NFR BLD AUTO: 3.6 % (ref 0–1.5)
BILIRUB SERPL-MCNC: 0.6 MG/DL (ref 0–1.2)
BUN SERPL-MCNC: 23 MG/DL (ref 6–20)
BUN/CREAT SERPL: 28.4 (ref 7–25)
CALCIUM SPEC-SCNC: 11.3 MG/DL (ref 8.6–10.5)
CHLORIDE SERPL-SCNC: 98 MMOL/L (ref 98–107)
CO2 SERPL-SCNC: 26 MMOL/L (ref 22–29)
CREAT SERPL-MCNC: 0.81 MG/DL (ref 0.57–1)
DEPRECATED RDW RBC AUTO: 49.5 FL (ref 37–54)
EGFRCR SERPLBLD CKD-EPI 2021: 89.7 ML/MIN/1.73
EOSINOPHIL # BLD AUTO: 0.08 10*3/MM3 (ref 0–0.4)
EOSINOPHIL NFR BLD AUTO: 0.6 % (ref 0.3–6.2)
ERYTHROCYTE [DISTWIDTH] IN BLOOD BY AUTOMATED COUNT: 15.4 % (ref 12.3–15.4)
GLOBULIN UR ELPH-MCNC: 3.2 GM/DL
GLUCOSE SERPL-MCNC: 365 MG/DL (ref 65–99)
HCT VFR BLD AUTO: 33.3 % (ref 34–46.6)
HGB BLD-MCNC: 10.2 G/DL (ref 12–15.9)
LYMPHOCYTES # BLD AUTO: 1.84 10*3/MM3 (ref 0.7–3.1)
LYMPHOCYTES NFR BLD AUTO: 13.6 % (ref 19.6–45.3)
MAGNESIUM SERPL-MCNC: 1.4 MG/DL (ref 1.6–2.6)
MCH RBC QN AUTO: 28.5 PG (ref 26.6–33)
MCHC RBC AUTO-ENTMCNC: 30.6 G/DL (ref 31.5–35.7)
MCV RBC AUTO: 93 FL (ref 79–97)
MONOCYTES # BLD AUTO: 1.11 10*3/MM3 (ref 0.1–0.9)
MONOCYTES NFR BLD AUTO: 8.2 % (ref 5–12)
NEUTROPHILS NFR BLD AUTO: 10.02 10*3/MM3 (ref 1.7–7)
NEUTROPHILS NFR BLD AUTO: 74 % (ref 42.7–76)
PLATELET # BLD AUTO: 473 10*3/MM3 (ref 140–450)
PMV BLD AUTO: 10.2 FL (ref 6–12)
POTASSIUM SERPL-SCNC: 4.2 MMOL/L (ref 3.5–5.2)
PROT SERPL-MCNC: 7.6 G/DL (ref 6–8.5)
RBC # BLD AUTO: 3.58 10*6/MM3 (ref 3.77–5.28)
SODIUM SERPL-SCNC: 135 MMOL/L (ref 136–145)
WBC NRBC COR # BLD AUTO: 13.54 10*3/MM3 (ref 3.4–10.8)

## 2024-01-29 PROCEDURE — 85025 COMPLETE CBC W/AUTO DIFF WBC: CPT

## 2024-01-29 PROCEDURE — 83735 ASSAY OF MAGNESIUM: CPT | Performed by: INTERNAL MEDICINE

## 2024-01-29 PROCEDURE — 1125F AMNT PAIN NOTED PAIN PRSNT: CPT | Performed by: INTERNAL MEDICINE

## 2024-01-29 PROCEDURE — 36415 COLL VENOUS BLD VENIPUNCTURE: CPT

## 2024-01-29 PROCEDURE — 80053 COMPREHEN METABOLIC PANEL: CPT | Performed by: INTERNAL MEDICINE

## 2024-01-29 PROCEDURE — 99215 OFFICE O/P EST HI 40 MIN: CPT | Performed by: INTERNAL MEDICINE

## 2024-01-29 RX ORDER — ALPRAZOLAM 0.5 MG/1
0.5 TABLET ORAL NIGHTLY PRN
Qty: 30 TABLET | Refills: 0 | Status: SHIPPED | OUTPATIENT
Start: 2024-01-29

## 2024-01-29 RX ORDER — ALPRAZOLAM 0.5 MG/1
0.5 TABLET ORAL NIGHTLY PRN
Qty: 30 TABLET | Refills: 0 | Status: CANCELLED | OUTPATIENT
Start: 2024-01-29

## 2024-01-29 NOTE — PROGRESS NOTES
Specialty Pharmacy Note: Sprycel (dasatinib)    Met with patient in clinic today to discuss side effects and adherence to medication.  She is currently prescribed Sprycel (dasatinib) 80 mg daily.  She had previously reported to pharmacy care coordinator that she wasn't taking medication and pharmacist reached out multiple times without success.  Today she reports that she is taking Sprycel but usually only a couple times a week, when she is feeling good.  She reports that she has a lot of nausea with medication and will sometimes vomit for up to 7 days after one dose of medication.  She reports that when she does vomit that it is large volume and that her nausea medication does not help at all.  She is planning to discuss with MD today to lower dose or use alternate agent.  Pharmacy will continue to follow.    Thanks,    Ros CALHOUN, PharmD

## 2024-01-29 NOTE — PATIENT INSTRUCTIONS
Megace - Megestrol Tablets  What is this medication?  MEGESTROL (me RON trol) reduces the symptoms of breast or endometrial cancer. It works by decreasing levels of estrogen and other hormones in your body, which may slow or stop cancer cells from spreading or growing. This medication is a progestin hormone.    This medicine may be used for other purposes; ask your health care provider or pharmacist if you have questions.    COMMON BRAND NAME(S): Bharat    What should I tell my care team before I take this medication?  They need to know if you have any of these conditions:    Adrenal gland problems  Diabetes  History of blood clots of the legs, lungs, or other parts of the body  Kidney disease  Liver disease  Stroke  An unusual or allergic reaction to megestrol, other medications, foods, dyes, or preservatives  Pregnant or trying to get pregnant  Breast-feeding  How should I use this medication?  Take this medication by mouth. Follow the directions on the prescription label. Do not take your medication more often than directed. Take your doses at regular intervals. Do not stop taking except on the advice of your care team.    Talk to your care team about the use of this medication in children. Special care may be needed.    Overdosage: If you think you have taken too much of this medicine contact a poison control center or emergency room at once.    NOTE: This medicine is only for you. Do not share this medicine with others.    What if I miss a dose?  If you miss a dose, take it as soon as you can. If it is almost time for your next dose, take only that dose. Do not take double or extra doses.    What may interact with this medication?  Do not take this medication with any of the following:    Dofetilide  This medication may also interact with the following:    Indinavir  This list may not describe all possible interactions. Give your health care provider a list of all the medicines, herbs, non-prescription drugs,  or dietary supplements you use. Also tell them if you smoke, drink alcohol, or use illegal drugs. Some items may interact with your medicine.    What should I watch for while using this medication?  Visit your care team for regular checks on your progress. Continue taking this medication even if you feel better. It may take 2 months of regular use before you know if this medication is working for your condition.    Talk to your care team if you wish to become pregnant or think you might be pregnant. This medication can cause serious birth defects if taken during pregnancy. A negative pregnancy test is required before starting this medication. Effective contraception is recommended during treatment.    If you have diabetes, this medication may affect blood sugar levels. Check your blood sugar and talk to your care team if you notice changes.    What side effects may I notice from receiving this medication?  Side effects that you should report to your care team as soon as possible:    Allergic reactions--skin rash, itching, hives, swelling of the face, lips, tongue, or throat  Blood clot--pain, swelling, or warmth in the leg, shortness of breath, chest pain  Cushing syndrome--increased fat around the midsection, upper back, neck, or face, pink or purple stretch marks on the skin, thinning, fragile skin that easily bruises, unexpected hair growth  Heart failure--shortness of breath, swelling of the ankles, feet, or hands, sudden weight gain, unusual weakness or fatigue  High blood sugar (hyperglycemia)--increased thirst or amount of urine, unusual weakness or fatigue, blurry vision  Increase in blood pressure  Low adrenal gland function--nausea, vomiting, loss of appetite, unusual weakness or fatigue, dizziness  Side effects that usually do not require medical attention (report to your care team if they continue or are bothersome):    Hair loss  Hot flashes  Irregular menstrual cycles or spotting  Nausea  Shortness of  breath  Weight gain  This list may not describe all possible side effects. Call your doctor for medical advice about side effects. You may report side effects to FDA at 6-702-YOJ-8278.    Where should I keep my medication?  Keep out of the reach of children and pets.    Store at controlled room temperature between 15 and 30 degrees C (59 and 86 degrees F). Protect from heat above 40 degrees C (104 degrees F). Throw away any unused medication after the expiration date.    NOTE: This sheet is a summary. It may not cover all possible information. If you have questions about this medicine, talk to your doctor, pharmacist, or health care provider.    © 2023 [employer] (2022-11-03 00:00:00)    Additional Information From [employer] About Megace  Self-Care Tips:  Drink at least two to three quarts of fluid every 24 hours, unless you are instructed otherwise.  Use caution when driving or engaging in tasks that require alertness until response to the drug is known.  Avoid sun exposure.  Wear SPF 15 (or higher) sunblock and protective clothing.  Get plenty of rest.   Maintain good nutrition.  If you experience symptoms or side effects, be sure to discuss them with your health care team.  They can prescribe medications and/or offer other suggestions that are effective in managing such problems.    When to contact your doctor or health care provider:  Contact your health care provider immediately, day or night, if you should experience any of the following symptoms:    Difficulty breathing  Chest pain  Sudden severe headache  The following symptoms require medical attention, but are not an emergency.  Contact your health care provider within 24 hours of noticing any of the following:    Swelling, redness and pain in one leg or arm and not the other  Swelling of the face, lips or mouth  Abdominal pain  Vaginal itching, irritation or discharge  Always inform your health care provider if you experience any unusual  symptoms.

## 2024-01-29 NOTE — PROGRESS NOTES
Hematology/Oncology Outpatient Follow Up    PATIENT NAME:Nieves Mc  :1975  MRN: [id number]  PRIMARY CARE PHYSICIAN: Provider, No Known  REFERRING PHYSICIAN: No ref. provider found    Chief Complaint   Patient presents with    Follow-up     CML (chronic myelocytic leukemia)        HISTORY OF PRESENT ILLNESS:         Patient is a 48 y.o. female with PMH significant for CML on treatment with Imatinib.  Patient stated that she typically feels poorly with Imatinib with frequent nausea and vomiting.  Also complained of weakness and fatigue.  Patient presented to ED on 10/22/18 with complaints of an elevated blood sugar around 400.  Stated she felt poorly and has had a productive cough with yellow sputum.  Complained of nausea and stated she was unable to keep any food down anytime she ate.  The patient reported subjective fever without chills.  She stated she had been coughing so hard that she was vomiting.  She denied hematemesis.  Denied diarrhea, constipation or blood in her stool.       Patient’s chest x-ray showed no evidence of acute pulmonary embolus.  The patient has ground-glass opacities throughout the lungs.  There is some air trapping noted which would appear to be   infectious.  Patient was started on antibiotics.      Hem/Onc was consulted on 10/23/18 for co-management of patient with CML.  Patient was seen by Dr. Lerma on 10/12 on previous admission for patient reported CML on Imatinib (Gleevec).  Patient reports she is under the care of Dr. Roach at [facility] in [place].  Patient was seen by Dr. Lerma in May 2018 when patient presented with hematuria, which was attributed to her Imatinib.  Dr. Lerma followed during hospitalization but then patient returned to Dr. Roach for her usual follow up.  She was again seen on 10/12.  Patient is stating she will switch to Dr. Lerma.    Review of Dr. Roach’s note:  On 18 patient had BCR-abl which was 61.326.  Patient had been on  Imatinib 400 mg daily.  She had presented in March 2018 with WBC of 24, hemoglobin 13.1, platelet count of 1,067,000.  Patient apparently had follow up BCR-abl in August 2018, results are not available for review.  Her bone marrow aspiration and biopsy was also performed at that time is currently not available for review.    11/6/18 - .  Uric acid 6.5.  BCR-abl by RT-PCR was measured at 3.36%.    Due to thrombocytosis, patient was placed on Hydroxyurea 500 mg twice a day on 12/11/18.  Platelet count juana to 900,000.  Patient was noncompliant with CBCs that were recommended.    Patient was asked to return to the office after not being able to reach her.   12/27/18 - Bone marrow aspiration and biopsy was consistent with chronic myeloid leukemia.  BCR-abl positive, chronic phase.  ABL kinase mutational analysis is currently pending on the bone marrow.    1/3/19 - Repeat CBC, WBC 17, hemoglobin 11.9, platelet count 1,072,000.  Hydroxyurea was increased to 1 gm twice a day with follow up CBC.    1/6/19 - Follow up CBC showed progressive increase in platelet to 1.1 million.  Patient was offered admission to the hospital, which she declined.  Hydroxyurea was increased to 1 gm three   times a day as of 1/6/19.   1/7/19 - EKG shows sinus tachycardia.   milliseconds.    ABL kinase on peripheral blood was ordered on 1/11/19.  Based on sequence analysis, there was no mutation detected.    2/12/19 - Patient was initiated on Tasigna 300 mg twice a day.  2/13/19 - Urinalysis was negative for hematuria.   2/22/19 -  milliseconds.  3/13/19 - EKG with QTC is 413 milliseconds.  Nonspecific changes noted.    4/18/19 - EKG showed QTC prolonged to 453 milliseconds.  This is changed from prior.  4/18/19 - Free T4 normal at 0.91.  Magnesium was 1.7.  BUN is 6.  Creatinine 0.7.  Bilirubin 2.2.  Phosphorous normal 3.7.  TSH 0.43, normal.  Free T3 was normal at 3.47.  Ionized calcium   normal at 1.23.  BCR-abl was 0.522%.     5/7/19 - EKG showed QTC of 441 milliseconds.  QT was 366.     Patient has been lost to follow-up since 2019 for CML.  Patient states that she lost her insurance.  She also does not have any primary care physician at this time.  She is diabetic on insulin.  Patient was recently admitted to the hospital for pneumonia due to COVID-19 infection.  At that time patient made a decision to become compliant with treatment.  She is currently on to Cigna 300 mg p.o. twice a day.  She is also on hydroxyurea 1 g twice a day.  Overall she feels better, she has more energy.  3/1/2022 white count is 53.92, hemoglobin 11.7 and platelets are 472    6/1/2022: Patient has not been seen since March 2022.  Also verified that she has not been taking to Tasigna since February 2022.  She comes in today with cough for 1 and half months, shortness of breath, denies fevers.  6/30/2022 patient had 2D echocardiogram which showed an EF of 41 to 45%.  Left ventricular cavity is mildly dilated.  She was diagnosed with nonischemic cardiomyopathy    11/18/2022: Patient was transferred from [facility] to [facility] due to cardiac failure.  She was then seen by NP [email or web address] hematology department.  Patient underwent tumor aspiration and biopsy at that time did not show chronic myeloid leukemia in accelerated phase markedly hypercellular marrow with megakaryocytic and myeloid hyperplasia with atypia and increased basophils blasts are not increased less than 1% moderate reticulin fibrosis.  According to the patient hydroxyurea was discontinued she was prescribed Gleevec 600 mg daily but she has only been taking 400 mg as she cannot find other milligram tablets.  She is already been pump inserted into her pelvic area.  She continues to experience nausea which resulted in her not taking the baclofen she should.  1/12/2023: WBC 17.64, hemoglobin 10.2, MCV 88.8, platelets 458,000.  On Gleevec 600 mg daily and hydroxyurea 500 mg  twice daily.  2023: WBC 10.67, hemoglobin 10.0, MCV 86.4, platelets 370,000.  Patient on Gleevec 600 mg daily and hydroxyurea 500 mg once a day.  Patient instructed at the visit to discontinue her hydroxyurea.  2023: WBC 17.75, hemoglobin 10.4, MCV 87.2, platelets 425,000.  On Gleevec 600 mg daily.  3/10/2023: 2D echocardiogram showing EF of 44% EKG showing sinus tachycardia QTc of 491  10/5/2023: Patient was initiated on Dasatinib 100 mg p.o. daily  2023-2023: Patient hospitalized at [facility] due to rash that was thought to be secondary to Sprycel and elevated glucose.  Sprycel was held and patient was treated with a 5-day course of prednisone.  2023: WBC 3.10, hemoglobin 8.2, MCV 90.3, platelets 214,000, ANC 1100.    Past Medical History:   Diagnosis Date    Adverse effect of chemotherapy 2023    Bone pain     Chronic constipation 2023    COVID-19 virus infection 2022    Diabetes mellitus     Diabetic gastroparesis 2023    Extremity pain     Carlin. legs pain    Fall during current hospitalization 2023    Leg pain     left leg greater    Migraine     Petechial rash 2023    Pubic ramus fracture, left, closed, initial encounter 2023    Pulmonary embolism     Vision loss     doing surgery       Past Surgical History:   Procedure Laterality Date    BONE MARROW BIOPSY      BREAST SURGERY      BRONCHOSCOPY N/A 2022    Procedure: BRONCHOSCOPY bilateral lung washing;  Surgeon: Charlene Camara MD;  Location: [facility] ENDOSCOPY;  Service: Pulmonary;  Laterality: N/A;  post: rule out infection vs transfusion lung injury     SECTION      CHOLECYSTECTOMY      COLONOSCOPY N/A 2023    Procedure: COLONOSCOPY;  Surgeon: Freddy Villeda MD;  Location: [facility] ENDOSCOPY;  Service: Gastroenterology;  Laterality: N/A;  poor prep    ENDOSCOPY N/A 2023    Procedure: ESOPHAGOGASTRODUODENOSCOPY with biopsy x2 areas;  Surgeon: Freddy Villeda MD;  Location: [facility]  ENDOSCOPY;  Service: Gastroenterology;  Laterality: N/A;  food in stomach;abnormal duodenal mucosa    EYE SURGERY      laser surgery due  to hemmorage--- 5/13/2021-- another surgery  lt eye11/15/21    RETINAL DETACHMENT SURGERY      SPINE SURGERY      Lombardi spinal block    TUBAL ABDOMINAL LIGATION           Current Outpatient Medications:     acetaminophen (TYLENOL) 325 MG tablet, Take 2 tablets by mouth Every 4 (Four) Hours As Needed for Moderate Pain. Indications: Fever, Pain, Disp: 60 tablet, Rfl: 3    ALPRAZolam (Xanax) 0.5 MG tablet, Take 1 tablet by mouth At Night As Needed for Anxiety. Indications: Feeling Anxious, Disp: 30 tablet, Rfl: 0    budesonide-formoterol (SYMBICORT) 160-4.5 MCG/ACT inhaler, Inhale 2 puffs 2 (Two) Times a Day. (Patient taking differently: Inhale 2 puffs 2 (Two) Times a Day.), Disp: 10.2 g, Rfl: 1    cetirizine (zyrTEC) 10 MG tablet, Take 1 tablet by mouth Daily. (Patient not taking: Reported on 12/18/2023), Disp: 30 tablet, Rfl: 0    Continuous Blood Gluc Sensor (FreeStyle Zahira 2 Sensor) misc, Use 1 each 4 (Four) Times a Day Before Meals & at Bedtime. Dx code: E11.65, Disp: 2 each, Rfl: 2    dapagliflozin Propanediol (Farxiga) 10 MG tablet, Take 10 mg (1 tablet) by mouth Daily. Dx code: E11.65, Disp: 30 tablet, Rfl: 2    dasatinib (SPRYCEL) 80 MG chemo tablet, Take 1 tablet by mouth Daily. (Patient not taking: Reported on 12/18/2023), Disp: 30 tablet, Rfl: 5    furosemide (LASIX) 40 MG tablet, Take 1 tablet by mouth Daily. Indications: Edema, Disp: , Rfl:     gabapentin (NEURONTIN) 300 MG capsule, Take 2 capsules by mouth 3 (Three) Times a Day. Indications: Diabetes with Nerve Disease, Disp: , Rfl:     Insulin Glargine (LANTUS SOLOSTAR) 100 UNIT/ML injection pen, Inject 25 Units under the skin into the appropriate area as directed Every Night. Dx code: E11.65, Disp: 15 mL, Rfl: 2    Insulin Lispro, 1 Unit Dial, (HumaLOG KwikPen) 100 UNIT/ML solution pen-injector, Inject 7 Units  under the skin into the appropriate area as directed 3 (Three) Times a Day Before Meals. Dx code: E11.65, Disp: 15 mL, Rfl: 2    Insulin Pen Needle (Pen Needles) 32G X 4 MM misc, Use 1 each 4 (Four) Times a Day Before Meals & at Bedtime. Dx code: E11.65, Disp: 200 each, Rfl: 2    midodrine (PROAMATINE) 10 MG tablet, Take 1 tablet by mouth Every 8 (Eight) Hours., Disp: 90 tablet, Rfl: 0    mirtazapine (REMERON) 15 MG tablet, Take 1 tablet by mouth every night at bedtime. Indications: Major Depressive Disorder, Disp: 30 tablet, Rfl: 2    Nystatin (magic mouthwash), Swish and spit 5 mL 4 (Four) Times a Day. 164 cc Nystatin 140 cc Benadryl 96 cc Viscous Xylocaine, Disp: 100 mL, Rfl: 3    ondansetron ODT (ZOFRAN-ODT) 4 MG disintegrating tablet, Place 1 tablet on the tongue Every 8 (Eight) Hours As Needed for Nausea or Vomiting. Indications: Nausea and Vomiting, Disp: 30 tablet, Rfl: 2    oxymetazoline (AFRIN) 0.05 % nasal spray, 2 sprays into the nostril(s) as directed by provider 2 (Two) Times a Day., Disp: , Rfl:     pain patient supplied pump, 0.375 mL/hr by Intrathecal route Daily. Active pump Prescriber: Dr. Shelton - pain management  Med name/ concentration: Dilaudid  Last refill: due soon Lockout period: every 4 hours   Indications: chronic pain, Disp: , Rfl:     predniSONE (DELTASONE) 20 MG tablet, Take 1 tablet by mouth Daily With Breakfast. (Patient not taking: Reported on 12/18/2023), Disp: 3 tablet, Rfl: 0    prochlorperazine (COMPAZINE) 10 MG tablet, Take 1 tablet by mouth Every 6 (Six) Hours As Needed for Nausea or Vomiting., Disp: 30 tablet, Rfl: 2    prochlorperazine (COMPAZINE) 25 MG suppository, Insert 1 suppository into the rectum Every 12 (Twelve) Hours As Needed for Nausea or Vomiting. Use when unable to take oral compazine  Indications: Nausea and Vomiting, Disp: 10 suppository, Rfl: 2    promethazine (PHENERGAN) 12.5 MG tablet, Take 1 tablet by mouth Every 6 (Six) Hours As Needed for Nausea or  Vomiting., Disp: 30 tablet, Rfl: 0    tiZANidine (ZANAFLEX) 4 MG tablet, Take 1 tablet by mouth Daily. Indications: Musculoskeletal Pain, Disp: , Rfl:   No current facility-administered medications for this visit.    Facility-Administered Medications Ordered in Other Visits:     acetaminophen (TYLENOL) tablet 650 mg, 650 mg, Oral, Once, Denisha Gill APRN    No Known Allergies    Family History   Problem Relation Age of Onset    Diabetes Mother     Diabetes Maternal Grandmother     Heart attack Maternal Grandmother     Stroke Maternal Grandmother        Cancer-related family history is not on file.    Social History     Tobacco Use    Smoking status: Never    Smokeless tobacco: Never   Vaping Use    Vaping Use: Never used   Substance Use Topics    Alcohol use: No    Drug use: No       I have reviewed and confirmed the accuracy of the patient's history: Chief complaint, HPI, ROS, and Subjective as entered by the MA/LPN/RN. Meghann Lerma MD 01/29/24        SUBJECTIVE:    Hope is here today for her hospital follow-up appointment and to be evaluated for resuming Sprycel.  She reports that the skin manifestations have resolved while holding Sprycel and completing her steroid.  She is willing to resume Sprycel but states she will not take at the 100 mg daily dose.  She voices feeling tired of being unwell.  She has trouble tolerating the medications for her CML but she also knows how poorly she will feel if she does not take anything to control the disease.  She is unsure how she would like to proceed and once her family to have a meeting to discuss her continuing treatment versus her only receiving care for her comfort.  She has trouble with insomnia, depression and anxiety and also pain control.      Patient is here today for follow-up.  She complains of significant nausea.  She also has decreased appetite and requesting for appetite stimulant.  She is a coming today by her daughter for this  "appointment.      Nieves Mc reports a pain score of 7.  Given her pain assessment as noted, treatment options were discussed and the following options were decided upon as a follow-up plan to address the patient's pain: continuation of current treatment plan for pain. Patient is established with pain management.     REVIEW OF SYSTEMS:    Review of Systems   Constitutional: Negative for chills and fever.   HENT: Negative for ear pain, mouth sores, nosebleeds and sore throat.    Eyes: Negative for photophobia and visual disturbance.   Respiratory: Negative for wheezing and stridor.    Cardiovascular: Negative for chest pain and palpitations.   Gastrointestinal: Negative for abdominal pain, diarrhea,.  She has nausea  Endocrine: Negative for cold intolerance and heat intolerance.   Genitourinary: Negative for dysuria and hematuria.   Musculoskeletal: Negative for joint swelling and neck stiffness.  Positive for back and leg pain.   Skin: Negative for color change and rash.   Neurological: Negative for seizures and syncope.   Hematological: Negative for adenopathy.        No obvious bleeding   Psychiatric/Behavioral: Negative for agitation, confusion and hallucinations. Positive for insomnia, anxiety, depression.      OBJECTIVE:    Vitals:    01/29/24 1316   BP: 150/88   Pulse: (!) 121   Resp: 18   Temp: 98 °F (36.7 °C)   TempSrc: Infrared   SpO2: 98%   Weight: 72.1 kg (159 lb)   Height: 162.6 cm (64\")   PainSc:   7   PainLoc: Generalized                 Body mass index is 27.29 kg/m².    ECOG  (1) Restricted in physically strenuous activity, ambulatory and able to do work of light nature    Physical Exam  Vitals and nursing note reviewed.   Constitutional:       General: She is not in acute distress.     Appearance: She is not diaphoretic.   HENT:      Head: Normocephalic and atraumatic.   Eyes:      General: No scleral icterus.        Right eye: No discharge.         Left eye: No discharge.      " Conjunctiva/sclera: Conjunctivae normal.   Neck:      Thyroid: No thyromegaly.   Cardiovascular:      Rate and Rhythm: Normal rate and regular rhythm.      Heart sounds: Normal heart sounds. No friction rub. No gallop.    Pulmonary:      Effort: Pulmonary effort is normal. No respiratory distress.      Breath sounds: No stridor. No wheezing.   Abdominal:      General: Bowel sounds are normal.      Palpations: Abdomen is soft. There is no mass.      Tenderness: There is no abdominal tenderness. There is no guarding or rebound.   Musculoskeletal:         General: No tenderness. Normal range of motion.      Cervical back: Normal range of motion and neck supple.   Lymphadenopathy:      Cervical: No cervical adenopathy.   Skin:     General: Skin is warm.      Findings: No erythema or rash.   Neurological:      Mental Status: She is alert and oriented to person, place, and time.      Motor: No abnormal muscle tone.   Psychiatric:         Behavior: Behavior      I have reexamined the patient and the results are consistent with the previously documented exam. Meghann Lerma MD      RECENT LABS    WBC   Date Value Ref Range Status   01/29/2024 13.54 (H) 3.40 - 10.80 10*3/mm3 Final     RBC   Date Value Ref Range Status   01/29/2024 3.58 (L) 3.77 - 5.28 10*6/mm3 Final     Hemoglobin   Date Value Ref Range Status   01/29/2024 10.2 (L) 12.0 - 15.9 g/dL Final     Hematocrit   Date Value Ref Range Status   01/29/2024 33.3 (L) 34.0 - 46.6 % Final     MCV   Date Value Ref Range Status   01/29/2024 93.0 79.0 - 97.0 fL Final     MCH   Date Value Ref Range Status   01/29/2024 28.5 26.6 - 33.0 pg Final     MCHC   Date Value Ref Range Status   01/29/2024 30.6 (L) 31.5 - 35.7 g/dL Final     RDW   Date Value Ref Range Status   01/29/2024 15.4 12.3 - 15.4 % Final     RDW-SD   Date Value Ref Range Status   01/29/2024 49.5 37.0 - 54.0 fl Final     MPV   Date Value Ref Range Status   01/29/2024 10.2 6.0 - 12.0 fL Final     Platelets    Date Value Ref Range Status   01/29/2024 473 (H) 140 - 450 10*3/mm3 Final     Neutrophil %   Date Value Ref Range Status   01/29/2024 74.0 42.7 - 76.0 % Final     Lymphocyte %   Date Value Ref Range Status   01/29/2024 13.6 (L) 19.6 - 45.3 % Final     Monocyte %   Date Value Ref Range Status   01/29/2024 8.2 5.0 - 12.0 % Final     Eosinophil %   Date Value Ref Range Status   01/29/2024 0.6 0.3 - 6.2 % Final     Basophil %   Date Value Ref Range Status   01/29/2024 3.6 (H) 0.0 - 1.5 % Final     External Neutrophils Abs   Date Value Ref Range Status   11/30/2022 7.6 (H) 1.7 - 6.0 x10(3)/ul Final     Neutrophils, Absolute   Date Value Ref Range Status   01/29/2024 10.02 (H) 1.70 - 7.00 10*3/mm3 Final     Lymphocytes, Absolute   Date Value Ref Range Status   01/29/2024 1.84 0.70 - 3.10 10*3/mm3 Final     Monocytes, Absolute   Date Value Ref Range Status   01/29/2024 1.11 (H) 0.10 - 0.90 10*3/mm3 Final     Eosinophils, Absolute   Date Value Ref Range Status   01/29/2024 0.08 0.00 - 0.40 10*3/mm3 Final     Basophils, Absolute   Date Value Ref Range Status   01/29/2024 0.49 (H) 0.00 - 0.20 10*3/mm3 Final     nRBC   Date Value Ref Range Status   12/27/2023 1.0 (H) 0.0 - 0.2 /100 WBC Final   12/04/2023 0.3 (H) 0.0 - 0.2 /100 WBC Final       Lab Results   Component Value Date    GLUCOSE 245 (H) 12/04/2023    BUN 18 12/04/2023    CREATININE 0.79 12/04/2023    EGFRIFNONA 133 02/02/2022    BCR 22.8 12/04/2023    K 3.9 12/04/2023    CO2 26.0 12/04/2023    CALCIUM 10.8 (H) 12/04/2023    ALBUMIN 3.8 12/04/2023    LABIL2 1.0 04/18/2019    AST 17 12/04/2023    ALT 26 12/04/2023           ASSESSMENT    Accelerated phase CML, status post bone marrow biopsy on 11/3/2022.  Patient was on ponatinib but developed significant pancytopenia and therefore treatment has been held.  Patient is not able to tolerate ponatinib despite significantly low doses at 10 mg every other day.  We will therefore discontinue ponatinib  Continue Sprycel 100  mg p.o. daily   Poor IV access, patient declines port but is open to PICC line placement therefore will order  Nausea vomiting secondary to Sprycel, likely she also has gastroparesis from longstanding uncontrolled diabetes: Will refer to GI for evaluation  Pain management issues, patient initially refusing to follow-up with her physician at [facility].  Patient has been seen by her pain physician.  She will continue follow-up with our pain physicians  Pancytopenia: CBC reviewed  BCR ABL kinase mutation assay was negative  Cardiomyopathy her most recent echo shows an EF of 44% which is an improvement from 26 to 30%.  To follow-up with her cardiologist  Anxiety: Refill anxiolytics today  Pancytopenia secondary to med, CML not requiring blood transfusion now.  For now continue to observe   CML in chronic phase with no significant hematologic or molecular or cytogenetic response on   Imatinib.  ABL kinase mutational analysis was negative.  We  have represcribed to Tasigna 300 mg twice a day.  Patient has been noncompliant with treatments and ended up in the hospital with hyperleukocytosis, peripheral blood blasts.  Bone marrow biopsy suggest that she remains in chronic phase.  Continue to Tasiigna at the current doses.  Hydroxyurea has been discontinued.  Uncontrolled diabetes type 1, followed at the [facility] by Dr. Calles  Chronic anemia: Stable, multifactorial from CML, to Tasigna, hydroxyurea.  This has improved  Insomnia  Situational anxiety, not suicidal.  Continue Xanax 0.5 mg once daily  History of medical noncompliance  Pulmonary embolism: Xarelto restarted  Recent COVID-19 pneumonia  History of prolonged QTC      Plans    Continue Sprycel.  Will decrease to Sprycel 80 mg p.o. daily, patient is agreeable with this dose.  Discussed with Dr. Lerma and oral chemo pharmacist.  Refill Ativan  PICC line placement  Will see if Megace is an option for her  Urgent referral to  outpatient palliative care.  Patient could benefit from discussion of goals, anxiety and depression management, insomnia management, pain management.  Mugard mouthwash for mucositis as needed  Continue Phenergan for nausea.  Change Zofran to 4 mg every 8 hours as needed for nausea to decrease risk of prolonged QT interval.    CMP and mag level today  Would request for HLA matched platelet transfusions  Patient to establish with new PCP.  She was given numbers to Dr. Christina Stewart's office to call  [facility]  DC trazodone 25 mg due to QT prolongation  Continue to follow-up with cardiology in regards to dosing of her diuretics.  EKG reviewed.  She has slight prolongation of QT.  We will discontinue Zofran and Phenergan and use Compazine as needed for nausea.  Prescription has been sent to her pharmacy  Refill Xanax  All questions answered  Continue to follow-up with PCP per routine  Follow-up with pain management per routine  Follow-up 4 weeks  All questions answered      I spent 40 total minutes, face-to-face, caring for Hope today. 90% of this time involved counseling and/or coordination of care as documented within this note.

## 2024-01-30 ENCOUNTER — HOSPITAL ENCOUNTER (OUTPATIENT)
Dept: OTHER | Facility: HOSPITAL | Age: 49
Discharge: HOME | End: 2024-01-30
Payer: MEDICARE

## 2024-01-30 ENCOUNTER — HOSPITAL ENCOUNTER (OUTPATIENT)
Dept: INFUSION THERAPY | Facility: HOSPITAL | Age: 49
Discharge: HOME | End: 2024-01-30
Payer: MEDICARE

## 2024-01-30 ENCOUNTER — TELEPHONE (OUTPATIENT)
Dept: ONCOLOGY | Facility: CLINIC | Age: 49
End: 2024-01-30

## 2024-01-30 VITALS
TEMPERATURE: 98 F | SYSTOLIC BLOOD PRESSURE: 110 MMHG | DIASTOLIC BLOOD PRESSURE: 73 MMHG | OXYGEN SATURATION: 96 % | RESPIRATION RATE: 16 BRPM | HEART RATE: 106 BPM

## 2024-01-30 DIAGNOSIS — E87.1 HYPONATREMIA: Primary | ICD-10-CM

## 2024-01-30 DIAGNOSIS — E86.0 DEHYDRATION: ICD-10-CM

## 2024-01-30 DIAGNOSIS — E86.0 DEHYDRATION: Primary | ICD-10-CM

## 2024-01-30 DIAGNOSIS — E83.52 HYPERCALCEMIA: ICD-10-CM

## 2024-01-30 DIAGNOSIS — E83.52 HYPERCALCEMIA: Primary | ICD-10-CM

## 2024-01-30 LAB
CA-I SERPL ISE-MCNC: 1.52 MMOL/L (ref 1.2–1.3)
PTH-INTACT SERPL-MCNC: 24.8 PG/ML (ref 15–65)

## 2024-01-30 PROCEDURE — 96361 HYDRATE IV INFUSION ADD-ON: CPT

## 2024-01-30 PROCEDURE — 25810000003 SODIUM CHLORIDE 0.9 % SOLUTION: Performed by: INTERNAL MEDICINE

## 2024-01-30 PROCEDURE — C1751 CATH, INF, PER/CENT/MIDLINE: HCPCS

## 2024-01-30 PROCEDURE — 83970 ASSAY OF PARATHORMONE: CPT | Performed by: INTERNAL MEDICINE

## 2024-01-30 PROCEDURE — 96366 THER/PROPH/DIAG IV INF ADDON: CPT

## 2024-01-30 PROCEDURE — 96360 HYDRATION IV INFUSION INIT: CPT

## 2024-01-30 PROCEDURE — 82330 ASSAY OF CALCIUM: CPT | Performed by: INTERNAL MEDICINE

## 2024-01-30 RX ORDER — MAGNESIUM OXIDE 400 MG/1
400 TABLET ORAL DAILY
Qty: 3 TABLET | Refills: 0 | Status: SHIPPED | OUTPATIENT
Start: 2024-01-30 | End: 2024-02-02

## 2024-01-30 RX ORDER — SODIUM CHLORIDE 0.9 % (FLUSH) 0.9 %
20 SYRINGE (ML) INJECTION DAILY
Qty: 600 ML | Refills: 2 | Status: SHIPPED | OUTPATIENT
Start: 2024-01-30

## 2024-01-30 RX ADMIN — SODIUM CHLORIDE 1000 ML: 9 INJECTION, SOLUTION INTRAVENOUS at 14:08

## 2024-01-30 NOTE — TELEPHONE ENCOUNTER
Caller: Nieves Mc A    Relationship: Self    Best call back number: [phone]     What is the best time to reach you: ANYTIME    Who are you requesting to speak with (clinical staff, provider,  specific staff member): CLINICAL - ANJANA     What was the call regarding: PT HAD A PICC LINE PUT IN TODAY AND THEY TOLD HERE THAT IT WOULD NEED TO BE FLUSHED ONCE A DAY. THE PT IS WANTING TO KNOW IF THEY ARE GOING TO DO THAT WHEN SHE LABS TOMORROW. ALSO WANTS TO KNOW HOW THIS WILL WORK.     Is it okay if the provider responds through MyChart: YES

## 2024-01-30 NOTE — PROGRESS NOTES
Pt arrived for labs and IV fluids.  Labs drawn via PICC line without difficultly.  Fluids started per MD order

## 2024-01-30 NOTE — CONSULTS
PICC Line Insertion Procedure Note    Procedure: Insertion of #4 FR/18G PICC    Indications:  Home IV therapy    Active Time Out:  Correct patient: Yes  Correct procedure: Yes  Correct site: Yes  Verified with: CAMILA Hollis    Procedure Details:  Informed consent was obtained for the procedure.  Risk include, but are not limited to infection, air embolism, catheter tip moving, catheter blockage and phlebitis.     Maximum sterile technique was used including antiseptics, cap, gloves, gown, hand hygiene, mask, and sheet.    Ultrasound Guidance: Yes    #4 FR/18G PICC inserted to the L Basilic vein per hospital protocol by CAMILA Haynes.   Non-pulsatile blood return: yes    Lot #: [id number]  Expiration date: 13-    Complications:  none    Findings:  Catheter inserted to 44 cm, with 3 cm exposed.   Mid upper arm circumference is 30 cm.   Catheter was flushed with 10 cc NS and sterile dressing applied.  Patient tolerated procedure well.  PICC tip verified by:       [x] Sapiens 3cg       [] Chest X-ray    Recommendations:  Verbal and/or written Care/Maintenance instructions provided to patient.   Primary nurse notified.    Daniella Haynes RN  01/30/24  13:48 EST

## 2024-01-30 NOTE — PROGRESS NOTES
Called pt this morning to let her know that Dr. Lerma has advised that she come in today for fluids and labs based on her lab results from yesterday. Pt stated that she does not have transportation. I asked if she would be agreeable to coming in for fluids if I could set up transportation. Pt was agreeable to this. After multiple calls to the PICC team, [facility], and [facility] transport service, pt appts and transportation were set up so she could have her PICC line placed and receive fluids/have labs drawn at [facility]. Pt called back and left a message asking about PICC line care. I attempted to call her back, but had to leave a message. Detailed message left letting her know that her flushes were being sent to [facility] pharmacy and that home health can educate her on PICC line care at her appt tomorrow. Callback number left if she has any further questions.

## 2024-01-31 ENCOUNTER — HOME CARE VISIT (OUTPATIENT)
Dept: HOME HEALTH SERVICES | Facility: HOME HEALTHCARE | Age: 49
End: 2024-01-31
Payer: COMMERCIAL

## 2024-01-31 ENCOUNTER — LAB REQUISITION (OUTPATIENT)
Dept: LAB | Facility: HOSPITAL | Age: 49
End: 2024-01-31
Payer: MEDICARE

## 2024-01-31 ENCOUNTER — SPECIALTY PHARMACY (OUTPATIENT)
Dept: PHARMACY | Facility: HOSPITAL | Age: 49
End: 2024-01-31
Payer: MEDICARE

## 2024-01-31 VITALS
HEART RATE: 98 BPM | OXYGEN SATURATION: 96 % | RESPIRATION RATE: 17 BRPM | DIASTOLIC BLOOD PRESSURE: 70 MMHG | SYSTOLIC BLOOD PRESSURE: 122 MMHG | TEMPERATURE: 97.6 F

## 2024-01-31 DIAGNOSIS — E11.42 TYPE 2 DIABETES MELLITUS WITH DIABETIC POLYNEUROPATHY: ICD-10-CM

## 2024-01-31 LAB
BASOPHILS # BLD MANUAL: 0.12 10*3/MM3 (ref 0–0.2)
BASOPHILS NFR BLD MANUAL: 1 % (ref 0–1.5)
DEPRECATED RDW RBC AUTO: 49.9 FL (ref 37–54)
ERYTHROCYTE [DISTWIDTH] IN BLOOD BY AUTOMATED COUNT: 16 % (ref 12.3–15.4)
HCT VFR BLD AUTO: 25.1 % (ref 34–46.6)
HGB BLD-MCNC: 8.1 G/DL (ref 12–15.9)
LYMPHOCYTES # BLD MANUAL: 1.73 10*3/MM3 (ref 0.7–3.1)
LYMPHOCYTES NFR BLD MANUAL: 8 % (ref 5–12)
MCH RBC QN AUTO: 28.4 PG (ref 26.6–33)
MCHC RBC AUTO-ENTMCNC: 32.2 G/DL (ref 31.5–35.7)
MCV RBC AUTO: 88.3 FL (ref 79–97)
METAMYELOCYTES NFR BLD MANUAL: 2 % (ref 0–0)
MONOCYTES # BLD: 0.92 10*3/MM3 (ref 0.1–0.9)
NEUTROPHILS # BLD AUTO: 8.51 10*3/MM3 (ref 1.7–7)
NEUTROPHILS NFR BLD MANUAL: 60 % (ref 42.7–76)
NEUTS BAND NFR BLD MANUAL: 14 % (ref 0–5)
NRBC SPEC MANUAL: 1 /100 WBC (ref 0–0.2)
PLAT MORPH BLD: NORMAL
PLATELET # BLD AUTO: 317 10*3/MM3 (ref 140–450)
PMV BLD AUTO: 8.9 FL (ref 6–12)
RBC # BLD AUTO: 2.84 10*6/MM3 (ref 3.77–5.28)
RBC MORPH BLD: NORMAL
SCAN SLIDE: NORMAL
VARIANT LYMPHS NFR BLD MANUAL: 15 % (ref 19.6–45.3)
WBC MORPH BLD: NORMAL
WBC NRBC COR # BLD AUTO: 11.5 10*3/MM3 (ref 3.4–10.8)

## 2024-01-31 PROCEDURE — G0299 HHS/HOSPICE OF RN EA 15 MIN: HCPCS

## 2024-01-31 PROCEDURE — 85025 COMPLETE CBC W/AUTO DIFF WBC: CPT | Performed by: INTERNAL MEDICINE

## 2024-01-31 NOTE — HOME HEALTH
Routine Visit Note: Patient reports she had PICC line placed yesterday and was supposed to  supplies from the pharmacy but these cost $36 abd she can not afford to get these right now, Delivered some saline flushes to patient and instructed both patient and caregiver in process for properly flushing line at least once daily to keep it working well. Both patient and caregiver verbalized understanding and caregiver properly demonstrated flusing of line after specimen was obtained and caps were changed. Patient had new dressing with bloody gauze in place so PICC dressing was changed.  Line gave great blood return and flushes easily. site is clear and dry with dressing intact      Skill/education provided: Cardiopulmonary assessment, wound assessment and care, falls assessment, pain assessment, medication review and teaching    Patient/caregiver response: Patient tolerated assessment and wound care without complaint    Plan for next visit: Cardiopulmonary assessment, wound assessment and care, falls assessment, pain assessment, medication review and teaching    Other pertinent info: None
Primary Care...

## 2024-02-01 ENCOUNTER — HOME CARE VISIT (OUTPATIENT)
Dept: HOME HEALTH SERVICES | Facility: HOME HEALTHCARE | Age: 49
End: 2024-02-01
Payer: COMMERCIAL

## 2024-02-02 DIAGNOSIS — E83.52 HYPERCALCEMIA: Primary | ICD-10-CM

## 2024-02-02 NOTE — PROGRESS NOTES
Order called in to [facility] pharmacy for flushes and PICC line dressing kits.    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[employer], plays trumpet in school band, wrestling    Visits from Start of Care: 5    Missed Visits: 0    Reporting Period : 17 to 5/15/17    [x]  Patient  Verified  Payor:  / Plan: Garrel More DEPENDENTS / Product Type:  /    In time:1000  Out time:1033  Total Treatment Time (min): 33  Visit #: 6 of 8    SUBJECTIVE  Pain Level (0-10 scale): 0  Any medication changes, allergies to medications, adverse drug reactions, diagnosis change, or new procedure performed?: [x] No    [] Yes (see summary sheet for update)  Subjective functional status/changes:   [] No changes reported  \"It's feeling amazing actually. \"    OBJECTIVE    33 min Therapeutic Exercise:  [x] See flow sheet :   Rationale: increase ROM, increase strength, improve coordination, improve balance and increase proprioception to improve the patients ability to perform ADL's with reduced pain levels. With   [] TE   [] TA   [] neuro   [] other: Patient Education: [x] Review HEP    [] Progressed/Changed HEP based on:   [] positioning   [] body mechanics   [] transfers   [] heat/ice application    [] other:      Other Objective/Functional Measures: see goals below     Pain Level (0-10 scale) post treatment: 0    Summary of Care:  Short Term Goals: To be accomplished in 2 weeks:  1- Pt will be complaint with HEP in order to increase AROM and activity tolerance.   Status at Eval: empty end-feel with ROM due to pain  Current: reports compliance--MET  2- Pt will decrease pain report </=2/10 during weight bearing activities. Status at Eval: 8/10 with push ups  Current: 0/10 with 5 push-ups-- MET     Long Term Goals: To be accomplished in 4 weeks:  1- Pt will increase FOTO score >/=10 points in order to return to school activities without pain. Status at Eval: 60/100  Current: 89-- MET  2- Pt will obtain full AROM to right elbow extension, wrist extension, and pronation in order to return to full functional status. Status at Eval: empty end-feel to elbow extension, wrist extension, and pronation due to pain. Current: WNL AROM- MET  3- Pt will be able to tolerate 5 push-ups in full plank position without pain. Status at Eval; Pt is unable to place FWB to RUE due to pain in elbow during push ups and wrestling positions  Current: No pain with 5 push ups, no pain at gym over weekend-- MET    ASSESSMENT/Changes in Function: Pt independent with HEP and trailed gym over the weekend with no pain or limitations. Due to meeting all goals, pt prepared for discharge. Physical therapy consisted of exercises to target right elbow pain and improve ROM. Treatment strategies included therapeutic exercises, therapeutic activity, neuromuscular re-education, patient education, and modalities to improve tolerance to age appropriate activities, sport, and ADLs. Pt performed very well in therapy, and met all goals. Due to progress, pt to be discharged at this time.      Thank you for this referral!      PLAN  [x]Discontinue therapy: [x]Patient has reached or is progressing toward set goals      []Patient is non-compliant or has abdicated      []Due to lack of appreciable progress towards set goals    Salma Ahn, PT 5/15/2017  10:02 AM

## 2024-02-03 ENCOUNTER — HOSPITAL ENCOUNTER (EMERGENCY)
Facility: HOSPITAL | Age: 49
Discharge: HOME | End: 2024-02-03
Attending: EMERGENCY MEDICINE
Payer: MEDICARE

## 2024-02-03 VITALS
BODY MASS INDEX: 27.14 KG/M2 | HEART RATE: 110 BPM | HEIGHT: 64 IN | DIASTOLIC BLOOD PRESSURE: 78 MMHG | WEIGHT: 159 LBS | SYSTOLIC BLOOD PRESSURE: 122 MMHG | TEMPERATURE: 98.8 F | OXYGEN SATURATION: 94 % | RESPIRATION RATE: 18 BRPM

## 2024-02-03 DIAGNOSIS — Z45.2 ENCOUNTER FOR ASSESSMENT OF PERIPHERALLY INSERTED CENTRAL CATHETER (PICC): Primary | ICD-10-CM

## 2024-02-03 PROCEDURE — 99282 EMERGENCY DEPT VISIT SF MDM: CPT

## 2024-02-03 NOTE — ED PROVIDER NOTES
Subjective   History of Present Illness  Chief Complaint: PICC line displaced    Patient is a 48-year-old female presents to the ER requesting evaluation of her PICC line.  Patient states that she was standing at the store today when she tried to remove her jacket and felt like her PICC line got pulled out.  She notes bleeding around the site and in the bandage.  She has no pain there.  No numbness or tingling.  No other complaints.    PCP: None    History provided by:  Patient      Review of Systems   Constitutional:  Negative for fever.   HENT:  Negative for sore throat and trouble swallowing.    Respiratory:  Negative for shortness of breath and wheezing.    Cardiovascular:  Negative for chest pain.   Gastrointestinal:  Negative for abdominal pain.   Genitourinary:  Negative for dysuria.   Skin:  Negative for rash.        Bleeding around PICC line site   Neurological:  Negative for headaches.   Psychiatric/Behavioral:  Negative for behavioral problems.        Past Medical History:   Diagnosis Date    Adverse effect of chemotherapy 2023    Bone pain     Chronic constipation 2023    COVID-19 virus infection 2022    Diabetes mellitus     Diabetic gastroparesis 2023    Extremity pain     Carlin. legs pain    Fall during current hospitalization 2023    Leg pain     left leg greater    Migraine     Petechial rash 2023    Pubic ramus fracture, left, closed, initial encounter 2023    Pulmonary embolism     Vision loss     doing surgery       No Known Allergies    Past Surgical History:   Procedure Laterality Date    BONE MARROW BIOPSY      BREAST SURGERY      BRONCHOSCOPY N/A 2022    Procedure: BRONCHOSCOPY bilateral lung washing;  Surgeon: Charlene Camara MD;  Location: [facility] ENDOSCOPY;  Service: Pulmonary;  Laterality: N/A;  post: rule out infection vs transfusion lung injury     SECTION      CHOLECYSTECTOMY      COLONOSCOPY N/A 2023    Procedure: COLONOSCOPY;   Surgeon: Freddy Villeda MD;  Location: [facility] ENDOSCOPY;  Service: Gastroenterology;  Laterality: N/A;  poor prep    ENDOSCOPY N/A 6/29/2023    Procedure: ESOPHAGOGASTRODUODENOSCOPY with biopsy x2 areas;  Surgeon: Freddy Villeda MD;  Location: [facility] ENDOSCOPY;  Service: Gastroenterology;  Laterality: N/A;  food in stomach;abnormal duodenal mucosa    EYE SURGERY      laser surgery due  to hemmorage--- 5/13/2021-- another surgery  lt eye11/15/21    RETINAL DETACHMENT SURGERY      SPINE SURGERY      Lombardi spinal block    TUBAL ABDOMINAL LIGATION         Family History   Problem Relation Age of Onset    Diabetes Mother     Diabetes Maternal Grandmother     Heart attack Maternal Grandmother     Stroke Maternal Grandmother        Social History     Socioeconomic History    Marital status: Legally    Tobacco Use    Smoking status: Never    Smokeless tobacco: Never   Vaping Use    Vaping Use: Never used   Substance and Sexual Activity    Alcohol use: No    Drug use: No    Sexual activity: Defer           Objective   Physical Exam  Vitals and nursing note reviewed.   Constitutional:       Appearance: Normal appearance. She is well-developed. She is not ill-appearing or toxic-appearing.   HENT:      Head: Normocephalic and atraumatic.   Eyes:      Pupils: Pupils are equal, round, and reactive to light.   Cardiovascular:      Rate and Rhythm: Normal rate and regular rhythm.      Heart sounds: Normal heart sounds.   Pulmonary:      Effort: Pulmonary effort is normal. No respiratory distress.      Breath sounds: Normal breath sounds. No wheezing.   Skin:     General: Skin is warm and dry.      Capillary Refill: Capillary refill takes less than 2 seconds.      Findings: No rash.      Comments: PICC line in place left upper arm with some bleeding within the bandage.  It does not appear displaced.   Neurological:      General: No focal deficit present.      Mental Status: She is alert and oriented to person, place,  "and time.   Psychiatric:         Mood and Affect: Mood normal.         Behavior: Behavior normal.         Procedures           ED Course  ED Course as of 02/03/24 1818   Sat Feb 03, 2024 1812 Patient was evaluated by PICC team, line is in place, dressing change, patient ready for discharge []      ED Course User Index  [MC] EmiliMicaela henderson, PA    /78   Pulse 110   Temp 98.8 °F (37.1 °C) (Oral)   Resp 18   Ht 162.6 cm (64\")   Wt 72.1 kg (159 lb)   LMP 05/25/2022 (Approximate)   SpO2 94%   BMI 27.29 kg/m²   Labs Reviewed - No data to display  Medications - No data to display  No radiology results for the last day                                           Medical Decision Making  While in the ED appropriate PPE was worn during exam and throughout all encounters with the patient.  Patient had the above evaluation.  Patient was evaluated by PICC team, line is in appropriate position and operates appropriately.  Dressing was changed and there is no further bleeding.  Patient continues to report no pain or discomfort.  Patient will be discharged.    Discharge plan and instructions were discussed with the patient who verbalized understanding and is in agreement with the plan, all questions were answered at this time.  Patient is aware of signs symptoms that would require immediate return to the emergency room.  Patient understands importance of following up with primary care provider for further evaluation and worsening concerns as well as blood pressure recheck in the next 4 weeks.    Patient was discharged in improved stable condition with an upright steady gait.    Patient is aware that discharge does not mean that nothing is wrong but indicates no emergencies present and they must continue care with follow-up as given below or physician of their choice.    Problems Addressed:  Encounter for assessment of peripherally inserted central catheter (PICC): acute illness or injury        Final diagnoses: "   Encounter for assessment of peripherally inserted central catheter (PICC)       ED Disposition  ED Disposition       ED Disposition   Discharge    Condition   Stable    Comment   --               [facility] [place]  [facility] [id number]  [phone]  Schedule an appointment as soon as possible for a visit in 2 days  As needed, If symptoms worsen         Medication List        Stop      magnesium oxide 400 MG tablet  Commonly known as: MAG-Micaela Jose PA  02/03/24 [id number]

## 2024-02-03 NOTE — DISCHARGE INSTRUCTIONS
Take medications at home as prescribed  Be careful with your PICC line at home    Follow-up with primary care as needed    Return to the ER for new or worsening symptoms

## 2024-02-05 ENCOUNTER — SPECIALTY PHARMACY (OUTPATIENT)
Dept: PHARMACY | Facility: HOSPITAL | Age: 49
End: 2024-02-05
Payer: MEDICARE

## 2024-02-05 NOTE — PROGRESS NOTES
Specialty Pharmacy Note: Dasatinib    02/05/24  Problem: missing medication doses  Communication: patient: spoke with patient and she has a lot of nausea and vomiting and prefers to not take dasatinib when she feels nauseous and provider: aware of noncompliance with patients use of dasatinib    Recommendation: patient: spoke to patient and recommend that she take medication as prescribed for best results.  She understands she should take it and reports utilizing nausea medication with some relief, but is not willing to take it everyday if she is feeling badly.    Follow-up: 1 week to check on improved compliance  Ros Martinez

## 2024-02-05 NOTE — PROGRESS NOTES
Specialty Pharmacy Patient Management Program  Oncology Refill Outreach      Nieves is a 48 y.o. female contacted today regarding refills of Dasatinib (Sprycel) specialty medication(s).    Specialty medication(s) and dose(s) confirmed: yes  Changes to medications: no  Changes to insurance: no  Other medications being refilled: None      Refill Questions      Flowsheet Row Most Recent Value   Changes to allergies? No   Changes to medications? No   New conditions or infections since last clinic visit No   Unplanned office visit, urgent care, ED, or hospital admission in the last 4 weeks  No   How does patient/caregiver feel medication is working? Fair   Financial problems or insurance changes  No   Since the previous refill, were any specialty medication doses or scheduled injections missed or delayed?  Yes   If yes, please provide the amount around 2 weeks   Why were doses missed? Haven't taken them when feeling sick   Does this patient require a clinical escalation to a pharmacist? Yes            Delivery Questions      Flowsheet Row Most Recent Value   Delivery method Beeline   Delivery address verified with patient/caregiver? Yes   Delivery address Home   Number of medications in delivery 1   Medication(s) being filled and delivered Dasatinib   Doses left of specialty medications 2 weeks   Copay verified? Yes   Copay amount $0   Copay form of payment No copayment ($0)            Ship on Tuesday 2/6/24 to arrive on Wednesday 2/7/24 to patient home.      Follow-up: 1 month(s)     Jyotsna Anton, Pharmacy Technician  Specialty Pharmacy Technician   2/5/2024   14:00 EST

## 2024-02-07 ENCOUNTER — HOME CARE VISIT (OUTPATIENT)
Dept: HOME HEALTH SERVICES | Facility: HOME HEALTHCARE | Age: 49
End: 2024-02-07
Payer: COMMERCIAL

## 2024-02-07 ENCOUNTER — LAB REQUISITION (OUTPATIENT)
Dept: LAB | Facility: HOSPITAL | Age: 49
End: 2024-02-07
Payer: MEDICARE

## 2024-02-07 VITALS
HEART RATE: 76 BPM | SYSTOLIC BLOOD PRESSURE: 134 MMHG | RESPIRATION RATE: 17 BRPM | OXYGEN SATURATION: 97 % | TEMPERATURE: 97.6 F | DIASTOLIC BLOOD PRESSURE: 76 MMHG

## 2024-02-07 DIAGNOSIS — E11.42 TYPE 2 DIABETES MELLITUS WITH DIABETIC POLYNEUROPATHY: ICD-10-CM

## 2024-02-07 LAB
ANISOCYTOSIS BLD QL: ABNORMAL
DEPRECATED RDW RBC AUTO: 49.4 FL (ref 37–54)
DOHLE BOD BLD QL SMEAR: PRESENT
EOSINOPHIL # BLD MANUAL: 0.21 10*3/MM3 (ref 0–0.4)
EOSINOPHIL NFR BLD MANUAL: 1 % (ref 0.3–6.2)
ERYTHROCYTE [DISTWIDTH] IN BLOOD BY AUTOMATED COUNT: 16 % (ref 12.3–15.4)
ERYTHROCYTE [SEDIMENTATION RATE] IN BLOOD: 47 MM/HR (ref 0–20)
GIANT PLATELETS: ABNORMAL
HCT VFR BLD AUTO: 27.2 % (ref 34–46.6)
HGB BLD-MCNC: 8.8 G/DL (ref 12–15.9)
LARGE PLATELETS: ABNORMAL
LYMPHOCYTES # BLD MANUAL: 4.05 10*3/MM3 (ref 0.7–3.1)
LYMPHOCYTES NFR BLD MANUAL: 6 % (ref 5–12)
MCH RBC QN AUTO: 28.5 PG (ref 26.6–33)
MCHC RBC AUTO-ENTMCNC: 32.3 G/DL (ref 31.5–35.7)
MCV RBC AUTO: 88.2 FL (ref 79–97)
METAMYELOCYTES NFR BLD MANUAL: 4 % (ref 0–0)
MONOCYTES # BLD: 1.28 10*3/MM3 (ref 0.1–0.9)
MYELOCYTES NFR BLD MANUAL: 5 % (ref 0–0)
NEUTROPHILS # BLD AUTO: 13.85 10*3/MM3 (ref 1.7–7)
NEUTROPHILS NFR BLD MANUAL: 47 % (ref 42.7–76)
NEUTS BAND NFR BLD MANUAL: 18 % (ref 0–5)
PLATELET # BLD AUTO: 495 10*3/MM3 (ref 140–450)
PMV BLD AUTO: 8.2 FL (ref 6–12)
RBC # BLD AUTO: 3.08 10*6/MM3 (ref 3.77–5.28)
SCAN SLIDE: NORMAL
TOXIC GRANULATION: ABNORMAL
VARIANT LYMPHS NFR BLD MANUAL: 19 % (ref 19.6–45.3)
WBC NRBC COR # BLD AUTO: 21.3 10*3/MM3 (ref 3.4–10.8)

## 2024-02-07 PROCEDURE — G0299 HHS/HOSPICE OF RN EA 15 MIN: HCPCS

## 2024-02-07 PROCEDURE — 85025 COMPLETE CBC W/AUTO DIFF WBC: CPT | Performed by: INTERNAL MEDICINE

## 2024-02-07 PROCEDURE — 85652 RBC SED RATE AUTOMATED: CPT | Performed by: INTERNAL MEDICINE

## 2024-02-07 NOTE — Clinical Note
Patient reports her PCP is no longer Alida Latham APRN  (she does not know her last name)  but she sees Ángela Pat at [phone] as of Jan 1st and Esther is her  at [phone]. She also has Dr Lerma managing oncology.   Patient states Alida is no longer managing her HH services. We may need to verify who is signing off on HH orders.

## 2024-02-08 NOTE — HOME HEALTH
Routine Visit Note:    Skill/education provided: Cardiopulmonary assessment, PICC assessment, care and teaching, falls assessment, pain assessment, medication review and teaching, blood draw for ordered labs    Patient/caregiver response: Patient tolerated assessment, blood draw and PICC dressing change without complaint    Plan for next visit: Cardiopulmonary assessment, wound assessment and care, falls assessment, pain assessment, medication review and teaching, Weekly labs as ordered, PICC dressing care    Other pertinent info: None

## 2024-02-14 ENCOUNTER — HOME CARE VISIT (OUTPATIENT)
Dept: HOME HEALTH SERVICES | Facility: HOME HEALTHCARE | Age: 49
End: 2024-02-14
Payer: COMMERCIAL

## 2024-02-14 ENCOUNTER — LAB REQUISITION (OUTPATIENT)
Dept: LAB | Facility: HOSPITAL | Age: 49
End: 2024-02-14
Payer: MEDICARE

## 2024-02-14 VITALS
HEART RATE: 98 BPM | DIASTOLIC BLOOD PRESSURE: 76 MMHG | SYSTOLIC BLOOD PRESSURE: 138 MMHG | RESPIRATION RATE: 17 BRPM | OXYGEN SATURATION: 96 % | TEMPERATURE: 97.6 F

## 2024-02-14 DIAGNOSIS — E11.40 TYPE 2 DIABETES MELLITUS WITH DIABETIC NEUROPATHY, UNSPECIFIED: ICD-10-CM

## 2024-02-14 LAB
ANISOCYTOSIS BLD QL: ABNORMAL
BASOPHILS # BLD MANUAL: 0.82 10*3/MM3 (ref 0–0.2)
BASOPHILS NFR BLD MANUAL: 5 % (ref 0–1.5)
BLASTS NFR BLD MANUAL: 1 % (ref 0–0)
DEPRECATED RDW RBC AUTO: 51.6 FL (ref 37–54)
EOSINOPHIL # BLD MANUAL: 0.16 10*3/MM3 (ref 0–0.4)
EOSINOPHIL NFR BLD MANUAL: 1 % (ref 0.3–6.2)
ERYTHROCYTE [DISTWIDTH] IN BLOOD BY AUTOMATED COUNT: 16.5 % (ref 12.3–15.4)
HCT VFR BLD AUTO: 25.4 % (ref 34–46.6)
HGB BLD-MCNC: 8.3 G/DL (ref 12–15.9)
LYMPHOCYTES # BLD MANUAL: 2.79 10*3/MM3 (ref 0.7–3.1)
LYMPHOCYTES NFR BLD MANUAL: 6 % (ref 5–12)
MCH RBC QN AUTO: 29.1 PG (ref 26.6–33)
MCHC RBC AUTO-ENTMCNC: 32.8 G/DL (ref 31.5–35.7)
MCV RBC AUTO: 88.9 FL (ref 79–97)
METAMYELOCYTES NFR BLD MANUAL: 8 % (ref 0–0)
MONOCYTES # BLD: 0.98 10*3/MM3 (ref 0.1–0.9)
MYELOCYTES NFR BLD MANUAL: 11 % (ref 0–0)
NEUTROPHILS # BLD AUTO: 8.04 10*3/MM3 (ref 1.7–7)
NEUTROPHILS NFR BLD MANUAL: 40 % (ref 42.7–76)
NEUTS BAND NFR BLD MANUAL: 9 % (ref 0–5)
NRBC SPEC MANUAL: 1 /100 WBC (ref 0–0.2)
PATHOLOGY REVIEW: YES
PLAT MORPH BLD: NORMAL
PLATELET # BLD AUTO: 223 10*3/MM3 (ref 140–450)
PMV BLD AUTO: 8.4 FL (ref 6–12)
PROMYELOCYTES NFR BLD MANUAL: 2 % (ref 0–0)
RBC # BLD AUTO: 2.85 10*6/MM3 (ref 3.77–5.28)
SCAN SLIDE: NORMAL
VARIANT LYMPHS NFR BLD MANUAL: 1 % (ref 0–5)
VARIANT LYMPHS NFR BLD MANUAL: 16 % (ref 19.6–45.3)
WBC MORPH BLD: NORMAL
WBC NRBC COR # BLD AUTO: 16.4 10*3/MM3 (ref 3.4–10.8)

## 2024-02-14 PROCEDURE — G0299 HHS/HOSPICE OF RN EA 15 MIN: HCPCS

## 2024-02-14 PROCEDURE — 85025 COMPLETE CBC W/AUTO DIFF WBC: CPT | Performed by: INTERNAL MEDICINE

## 2024-02-15 LAB
LAB AP CASE REPORT: NORMAL
PATH REPORT.FINAL DX SPEC: NORMAL

## 2024-02-19 DIAGNOSIS — F41.8 DEPRESSION WITH ANXIETY: ICD-10-CM

## 2024-02-21 ENCOUNTER — HOME CARE VISIT (OUTPATIENT)
Dept: HOME HEALTH SERVICES | Facility: HOME HEALTHCARE | Age: 49
End: 2024-02-21
Payer: COMMERCIAL

## 2024-02-21 ENCOUNTER — LAB REQUISITION (OUTPATIENT)
Dept: LAB | Facility: HOSPITAL | Age: 49
End: 2024-02-21
Payer: MEDICARE

## 2024-02-21 VITALS
TEMPERATURE: 98.6 F | SYSTOLIC BLOOD PRESSURE: 120 MMHG | RESPIRATION RATE: 17 BRPM | HEART RATE: 104 BPM | DIASTOLIC BLOOD PRESSURE: 62 MMHG | OXYGEN SATURATION: 97 %

## 2024-02-21 DIAGNOSIS — E11.42 TYPE 2 DIABETES MELLITUS WITH DIABETIC POLYNEUROPATHY: ICD-10-CM

## 2024-02-21 LAB
ANION GAP SERPL CALCULATED.3IONS-SCNC: 15 MMOL/L (ref 5–15)
ANISOCYTOSIS BLD QL: ABNORMAL
BASOPHILS # BLD MANUAL: 0.51 10*3/MM3 (ref 0–0.2)
BASOPHILS NFR BLD MANUAL: 3 % (ref 0–1.5)
BUN SERPL-MCNC: 26 MG/DL (ref 6–20)
BUN/CREAT SERPL: 24.1 (ref 7–25)
CALCIUM SPEC-SCNC: 11.3 MG/DL (ref 8.6–10.5)
CHLORIDE SERPL-SCNC: 95 MMOL/L (ref 98–107)
CO2 SERPL-SCNC: 24 MMOL/L (ref 22–29)
CREAT SERPL-MCNC: 1.08 MG/DL (ref 0.57–1)
DEPRECATED RDW RBC AUTO: 52.9 FL (ref 37–54)
EGFRCR SERPLBLD CKD-EPI 2021: 63.5 ML/MIN/1.73
EOSINOPHIL # BLD MANUAL: 0.34 10*3/MM3 (ref 0–0.4)
EOSINOPHIL NFR BLD MANUAL: 2 % (ref 0.3–6.2)
ERYTHROCYTE [DISTWIDTH] IN BLOOD BY AUTOMATED COUNT: 16.5 % (ref 12.3–15.4)
GLUCOSE SERPL-MCNC: 448 MG/DL (ref 65–99)
HCT VFR BLD AUTO: 27.2 % (ref 34–46.6)
HGB BLD-MCNC: 8.4 G/DL (ref 12–15.9)
LYMPHOCYTES # BLD MANUAL: 3.25 10*3/MM3 (ref 0.7–3.1)
LYMPHOCYTES NFR BLD MANUAL: 6 % (ref 5–12)
MCH RBC QN AUTO: 26.4 PG (ref 26.6–33)
MCHC RBC AUTO-ENTMCNC: 30.7 G/DL (ref 31.5–35.7)
MCV RBC AUTO: 85.9 FL (ref 79–97)
METAMYELOCYTES NFR BLD MANUAL: 5 % (ref 0–0)
MONOCYTES # BLD: 1.03 10*3/MM3 (ref 0.1–0.9)
MYELOCYTES NFR BLD MANUAL: 10 % (ref 0–0)
NEUTROPHILS # BLD AUTO: 9.41 10*3/MM3 (ref 1.7–7)
NEUTROPHILS NFR BLD MANUAL: 52 % (ref 42.7–76)
NEUTS BAND NFR BLD MANUAL: 3 % (ref 0–5)
NRBC SPEC MANUAL: 1 /100 WBC (ref 0–0.2)
PLAT MORPH BLD: NORMAL
PLATELET # BLD AUTO: 244 10*3/MM3 (ref 140–450)
PMV BLD AUTO: 9.1 FL (ref 6–12)
POTASSIUM SERPL-SCNC: 4.7 MMOL/L (ref 3.5–5.2)
RBC # BLD AUTO: 3.17 10*6/MM3 (ref 3.77–5.28)
SCAN SLIDE: NORMAL
SODIUM SERPL-SCNC: 134 MMOL/L (ref 136–145)
VARIANT LYMPHS NFR BLD MANUAL: 19 % (ref 19.6–45.3)
WBC MORPH BLD: NORMAL
WBC NRBC COR # BLD AUTO: 17.1 10*3/MM3 (ref 3.4–10.8)

## 2024-02-21 PROCEDURE — G0299 HHS/HOSPICE OF RN EA 15 MIN: HCPCS

## 2024-02-21 PROCEDURE — 80048 BASIC METABOLIC PNL TOTAL CA: CPT | Performed by: INTERNAL MEDICINE

## 2024-02-21 PROCEDURE — 85025 COMPLETE CBC W/AUTO DIFF WBC: CPT | Performed by: INTERNAL MEDICINE

## 2024-02-21 RX ORDER — MIRTAZAPINE 15 MG/1
15 TABLET, FILM COATED ORAL
Qty: 90 TABLET | Refills: 3 | Status: SHIPPED | OUTPATIENT
Start: 2024-02-21

## 2024-02-21 NOTE — HOME HEALTH
Routine Visit Note: Patient reports no new medications, problems or issues since nurse was here last. Patient will require ongoing nursing assessments for PICC line dressing changes, assessments, weekly labs as ordered, cardiopulmonary assessment.    Skill/education provided: Cardiopulmonary assessment, PICC assessment and care, Blood draw for ordered labs, falls assessment, pain assessment, medication review    Patient/caregiver response: Patient tolerated assessment well with no complaints or issues    Plan for next visit: Cardiopulmonary assessment, PICC assessment and care, Blood draw for ordered labs, falls assessment, pain assessment, medication review and teaching, blood sugar assessment    Other pertinent info: None

## 2024-02-22 ENCOUNTER — TELEPHONE (OUTPATIENT)
Dept: ONCOLOGY | Facility: CLINIC | Age: 49
End: 2024-02-22
Payer: MEDICARE

## 2024-02-22 ENCOUNTER — HOME CARE VISIT (OUTPATIENT)
Dept: HOME HEALTH SERVICES | Facility: HOME HEALTHCARE | Age: 49
End: 2024-02-22
Payer: COMMERCIAL

## 2024-02-22 DIAGNOSIS — E83.52 HYPERCALCEMIA: Primary | ICD-10-CM

## 2024-02-22 NOTE — TELEPHONE ENCOUNTER
Dr. Lerma ordered:  Check ionized calcium   Zometa 4mg IV x 1 dose   Normal saline 1 1/2 L IV over 2 hours today due to abnormal labs.    Pt states that she absolutely cannot come in today due to her caregiver and transportation.  I asked her if she could come in tomorrow and she states that she would need an afternoon appt after 12 because her caregiver cannot leave her house until 12.  I offered her an appt at 2pm tomorrow and she denied the appt stating her caregiver needs to leave by 4. I let her know that we can set up transportation for her to and from the cancer center to get her IVF and medications, pt declined and states that she will be here next week to see Dr. Lerma and will just do this then.  I let pt know to call us back if she changes her mind.

## 2024-02-27 ENCOUNTER — HOSPITAL ENCOUNTER (OUTPATIENT)
Dept: ONCOLOGY | Facility: HOSPITAL | Age: 49
Discharge: HOME | End: 2024-02-27
Admitting: INTERNAL MEDICINE
Payer: MEDICARE

## 2024-02-27 ENCOUNTER — OFFICE VISIT (OUTPATIENT)
Dept: ONCOLOGY | Facility: CLINIC | Age: 49
End: 2024-02-27
Payer: MEDICARE

## 2024-02-27 ENCOUNTER — SPECIALTY PHARMACY (OUTPATIENT)
Dept: PHARMACY | Facility: HOSPITAL | Age: 49
End: 2024-02-27
Payer: MEDICARE

## 2024-02-27 VITALS
DIASTOLIC BLOOD PRESSURE: 82 MMHG | SYSTOLIC BLOOD PRESSURE: 135 MMHG | HEIGHT: 64 IN | HEART RATE: 104 BPM | OXYGEN SATURATION: 98 % | BODY MASS INDEX: 27.29 KG/M2

## 2024-02-27 DIAGNOSIS — Z45.2 PICC (PERIPHERALLY INSERTED CENTRAL CATHETER) FLUSH: Primary | ICD-10-CM

## 2024-02-27 DIAGNOSIS — E83.52 HYPERCALCEMIA: ICD-10-CM

## 2024-02-27 DIAGNOSIS — C92.10 CML (CHRONIC MYELOCYTIC LEUKEMIA): Primary | ICD-10-CM

## 2024-02-27 DIAGNOSIS — C92.10 CML (CHRONIC MYELOCYTIC LEUKEMIA): ICD-10-CM

## 2024-02-27 DIAGNOSIS — C92.10 BLAST CRISIS PHASE OF CHRONIC MYELOID LEUKEMIA: ICD-10-CM

## 2024-02-27 LAB
ANION GAP SERPL CALCULATED.3IONS-SCNC: 12 MMOL/L (ref 5–15)
BASOPHILS # BLD AUTO: 0.41 10*3/MM3 (ref 0–0.2)
BASOPHILS NFR BLD AUTO: 2.5 % (ref 0–1.5)
BUN SERPL-MCNC: 19 MG/DL (ref 6–20)
BUN/CREAT SERPL: 21.6 (ref 7–25)
CA-I SERPL ISE-MCNC: 1.46 MMOL/L (ref 1.2–1.3)
CALCIUM SPEC-SCNC: 10.8 MG/DL (ref 8.6–10.5)
CHLORIDE SERPL-SCNC: 97 MMOL/L (ref 98–107)
CO2 SERPL-SCNC: 27 MMOL/L (ref 22–29)
CREAT SERPL-MCNC: 0.88 MG/DL (ref 0.57–1)
DEPRECATED RDW RBC AUTO: 50.4 FL (ref 37–54)
EGFRCR SERPLBLD CKD-EPI 2021: 81.2 ML/MIN/1.73
EOSINOPHIL # BLD AUTO: 0.15 10*3/MM3 (ref 0–0.4)
EOSINOPHIL NFR BLD AUTO: 0.9 % (ref 0.3–6.2)
ERYTHROCYTE [DISTWIDTH] IN BLOOD BY AUTOMATED COUNT: 16.8 % (ref 12.3–15.4)
GLUCOSE SERPL-MCNC: 401 MG/DL (ref 65–99)
HCT VFR BLD AUTO: 27.1 % (ref 34–46.6)
HGB BLD-MCNC: 8.6 G/DL (ref 12–15.9)
LYMPHOCYTES # BLD AUTO: 2.23 10*3/MM3 (ref 0.7–3.1)
LYMPHOCYTES NFR BLD AUTO: 13.6 % (ref 19.6–45.3)
MCH RBC QN AUTO: 28.2 PG (ref 26.6–33)
MCHC RBC AUTO-ENTMCNC: 31.7 G/DL (ref 31.5–35.7)
MCV RBC AUTO: 88.9 FL (ref 79–97)
MONOCYTES # BLD AUTO: 1.02 10*3/MM3 (ref 0.1–0.9)
MONOCYTES NFR BLD AUTO: 6.2 % (ref 5–12)
NEUTROPHILS NFR BLD AUTO: 12.62 10*3/MM3 (ref 1.7–7)
NEUTROPHILS NFR BLD AUTO: 76.8 % (ref 42.7–76)
PLATELET # BLD AUTO: 235 10*3/MM3 (ref 140–450)
PMV BLD AUTO: 10.5 FL (ref 6–12)
POTASSIUM SERPL-SCNC: 4.7 MMOL/L (ref 3.5–5.2)
RBC # BLD AUTO: 3.05 10*6/MM3 (ref 3.77–5.28)
SODIUM SERPL-SCNC: 136 MMOL/L (ref 136–145)
WBC NRBC COR # BLD AUTO: 16.43 10*3/MM3 (ref 3.4–10.8)

## 2024-02-27 PROCEDURE — 80048 BASIC METABOLIC PNL TOTAL CA: CPT | Performed by: NURSE PRACTITIONER

## 2024-02-27 PROCEDURE — 82330 ASSAY OF CALCIUM: CPT | Performed by: INTERNAL MEDICINE

## 2024-02-27 PROCEDURE — 36592 COLLECT BLOOD FROM PICC: CPT

## 2024-02-27 PROCEDURE — 85025 COMPLETE CBC W/AUTO DIFF WBC: CPT | Performed by: INTERNAL MEDICINE

## 2024-02-27 RX ORDER — SODIUM CHLORIDE 0.9 % (FLUSH) 0.9 %
20 SYRINGE (ML) INJECTION AS NEEDED
Status: DISCONTINUED | OUTPATIENT
Start: 2024-02-27 | End: 2024-02-28 | Stop reason: HOSPADM

## 2024-02-27 RX ORDER — SODIUM CHLORIDE 0.9 % (FLUSH) 0.9 %
20 SYRINGE (ML) INJECTION AS NEEDED
Status: CANCELLED | OUTPATIENT
Start: 2024-02-27

## 2024-02-27 RX ADMIN — Medication 20 ML: at 15:30

## 2024-02-27 NOTE — PROGRESS NOTES
Hematology/Oncology Outpatient Follow Up    PATIENT NAME:Nieves Mc  :1975  MRN: [id number]  PRIMARY CARE PHYSICIAN: Provider, No Known  REFERRING PHYSICIAN: Provider, No Known    Chief Complaint   Patient presents with    Follow-up     CML (chronic myelocytic leukemia)        HISTORY OF PRESENT ILLNESS:         Patient is a 48 y.o. female with PMH significant for CML on treatment with Imatinib.  Patient stated that she typically feels poorly with Imatinib with frequent nausea and vomiting.  Also complained of weakness and fatigue.  Patient presented to ED on 10/22/18 with complaints of an elevated blood sugar around 400.  Stated she felt poorly and has had a productive cough with yellow sputum.  Complained of nausea and stated she was unable to keep any food down anytime she ate.  The patient reported subjective fever without chills.  She stated she had been coughing so hard that she was vomiting.  She denied hematemesis.  Denied diarrhea, constipation or blood in her stool.       Patient’s chest x-ray showed no evidence of acute pulmonary embolus.  The patient has ground-glass opacities throughout the lungs.  There is some air trapping noted which would appear to be   infectious.  Patient was started on antibiotics.      Hem/Onc was consulted on 10/23/18 for co-management of patient with CML.  Patient was seen by Dr. Lerma on 10/12 on previous admission for patient reported CML on Imatinib (Gleevec).  Patient reports she is under the care of Dr. Roach at [facility] in [place].  Patient was seen by Dr. Lerma in May 2018 when patient presented with hematuria, which was attributed to her Imatinib.  Dr. Lerma followed during hospitalization but then patient returned to Dr. Roach for her usual follow up.  She was again seen on 10/12.  Patient is stating she will switch to Dr. Lerma.    Review of Dr. Roach’s note:  On 18 patient had BCR-abl which was 61.326.  Patient had been on  Imatinib 400 mg daily.  She had presented in March 2018 with WBC of 24, hemoglobin 13.1, platelet count of 1,067,000.  Patient apparently had follow up BCR-abl in August 2018, results are not available for review.  Her bone marrow aspiration and biopsy was also performed at that time is currently not available for review.    11/6/18 - .  Uric acid 6.5.  BCR-abl by RT-PCR was measured at 3.36%.    Due to thrombocytosis, patient was placed on Hydroxyurea 500 mg twice a day on 12/11/18.  Platelet count juana to 900,000.  Patient was noncompliant with CBCs that were recommended.    Patient was asked to return to the office after not being able to reach her.   12/27/18 - Bone marrow aspiration and biopsy was consistent with chronic myeloid leukemia.  BCR-abl positive, chronic phase.  ABL kinase mutational analysis is currently pending on the bone marrow.    1/3/19 - Repeat CBC, WBC 17, hemoglobin 11.9, platelet count 1,072,000.  Hydroxyurea was increased to 1 gm twice a day with follow up CBC.    1/6/19 - Follow up CBC showed progressive increase in platelet to 1.1 million.  Patient was offered admission to the hospital, which she declined.  Hydroxyurea was increased to 1 gm three   times a day as of 1/6/19.   1/7/19 - EKG shows sinus tachycardia.   milliseconds.    ABL kinase on peripheral blood was ordered on 1/11/19.  Based on sequence analysis, there was no mutation detected.    2/12/19 - Patient was initiated on Tasigna 300 mg twice a day.  2/13/19 - Urinalysis was negative for hematuria.   2/22/19 -  milliseconds.  3/13/19 - EKG with QTC is 413 milliseconds.  Nonspecific changes noted.    4/18/19 - EKG showed QTC prolonged to 453 milliseconds.  This is changed from prior.  4/18/19 - Free T4 normal at 0.91.  Magnesium was 1.7.  BUN is 6.  Creatinine 0.7.  Bilirubin 2.2.  Phosphorous normal 3.7.  TSH 0.43, normal.  Free T3 was normal at 3.47.  Ionized calcium   normal at 1.23.  BCR-abl was 0.522%.     5/7/19 - EKG showed QTC of 441 milliseconds.  QT was 366.     Patient has been lost to follow-up since 2019 for CML.  Patient states that she lost her insurance.  She also does not have any primary care physician at this time.  She is diabetic on insulin.  Patient was recently admitted to the hospital for pneumonia due to COVID-19 infection.  At that time patient made a decision to become compliant with treatment.  She is currently on to Cigna 300 mg p.o. twice a day.  She is also on hydroxyurea 1 g twice a day.  Overall she feels better, she has more energy.  3/1/2022 white count is 53.92, hemoglobin 11.7 and platelets are 472    6/1/2022: Patient has not been seen since March 2022.  Also verified that she has not been taking to Tasigna since February 2022.  She comes in today with cough for 1 and half months, shortness of breath, denies fevers.  6/30/2022 patient had 2D echocardiogram which showed an EF of 41 to 45%.  Left ventricular cavity is mildly dilated.  She was diagnosed with nonischemic cardiomyopathy    11/18/2022: Patient was transferred from [facility] to [facility] due to cardiac failure.  She was then seen by NP [facility] hematology department.  Patient underwent tumor aspiration and biopsy at that time did not show chronic myeloid leukemia in accelerated phase markedly hypercellular marrow with megakaryocytic and myeloid hyperplasia with atypia and increased basophils blasts are not increased less than 1% moderate reticulin fibrosis.  According to the patient hydroxyurea was discontinued she was prescribed Gleevec 600 mg daily but she has only been taking 400 mg as she cannot find other milligram tablets.  She is already been pump inserted into her pelvic area.  She continues to experience nausea which resulted in her not taking the baclofen she should.  1/12/2023: WBC 17.64, hemoglobin 10.2, MCV 88.8, platelets 458,000.  On Gleevec 600 mg daily and hydroxyurea 500 mg  twice daily.  2023: WBC 10.67, hemoglobin 10.0, MCV 86.4, platelets 370,000.  Patient on Gleevec 600 mg daily and hydroxyurea 500 mg once a day.  Patient instructed at the visit to discontinue her hydroxyurea.  2023: WBC 17.75, hemoglobin 10.4, MCV 87.2, platelets 425,000.  On Gleevec 600 mg daily.  3/10/2023: 2D echocardiogram showing EF of 44% EKG showing sinus tachycardia QTc of 491  10/5/2023: Patient was initiated on Dasatinib 100 mg p.o. daily  2023-2023: Patient hospitalized at [facility] due to rash that was thought to be secondary to Sprycel and elevated glucose.  Sprycel was held and patient was treated with a 5-day course of prednisone.  2023: WBC 3.10, hemoglobin 8.2, MCV 90.3, platelets 214,000, ANC 1100.    Past Medical History:   Diagnosis Date    Adverse effect of chemotherapy 2023    Bone pain     Chronic constipation 2023    COVID-19 virus infection 2022    Diabetes mellitus     Diabetic gastroparesis 2023    Extremity pain     Carlin. legs pain    Fall during current hospitalization 2023    Leg pain     left leg greater    Migraine     Petechial rash 2023    Pubic ramus fracture, left, closed, initial encounter 2023    Pulmonary embolism     Vision loss     doing surgery       Past Surgical History:   Procedure Laterality Date    BONE MARROW BIOPSY      BREAST SURGERY      BRONCHOSCOPY N/A 2022    Procedure: BRONCHOSCOPY bilateral lung washing;  Surgeon: Charlene Camara MD;  Location: [facility] ENDOSCOPY;  Service: Pulmonary;  Laterality: N/A;  post: rule out infection vs transfusion lung injury     SECTION      CHOLECYSTECTOMY      COLONOSCOPY N/A 2023    Procedure: COLONOSCOPY;  Surgeon: Freddy Villeda MD;  Location: [facility] ENDOSCOPY;  Service: Gastroenterology;  Laterality: N/A;  poor prep    ENDOSCOPY N/A 2023    Procedure: ESOPHAGOGASTRODUODENOSCOPY with biopsy x2 areas;  Surgeon: Freddy Villeda MD;  Location: [facility]  ENDOSCOPY;  Service: Gastroenterology;  Laterality: N/A;  food in stomach;abnormal duodenal mucosa    EYE SURGERY      laser surgery due  to hemmorage--- 5/13/2021-- another surgery  lt eye11/15/21    RETINAL DETACHMENT SURGERY      SPINE SURGERY      Lombardi spinal block    TUBAL ABDOMINAL LIGATION           Current Outpatient Medications:     acetaminophen (TYLENOL) 325 MG tablet, Take 2 tablets by mouth Every 4 (Four) Hours As Needed for Moderate Pain. Indications: Fever, Pain, Disp: 60 tablet, Rfl: 3    ALPRAZolam (Xanax) 0.5 MG tablet, Take 1 tablet by mouth At Night As Needed for Anxiety. Indications: Feeling Anxious, Disp: 30 tablet, Rfl: 0    dapagliflozin Propanediol (Farxiga) 10 MG tablet, Take 10 mg (1 tablet) by mouth Daily. Dx code: E11.65, Disp: 30 tablet, Rfl: 2    gabapentin (Neurontin) 800 MG tablet, Take 1 tablet by mouth 3 (Three) Times a Day. Ordered through [facility]  Indications: Diabetes with Nerve Disease, Disp: , Rfl:     Insulin Glargine (LANTUS SOLOSTAR) 100 UNIT/ML injection pen, Inject 25 Units under the skin into the appropriate area as directed Every Night. Dx code: E11.65, Disp: 15 mL, Rfl: 2    Insulin Pen Needle (Pen Needles) 32G X 4 MM misc, Use 1 each 4 (Four) Times a Day Before Meals & at Bedtime. Dx code: E11.65, Disp: 200 each, Rfl: 2    pain patient supplied pump, 0.375 mL/hr by Intrathecal route Daily. Active pump Prescriber: Dr. Shelton - pain management  Med name/ concentration: Dilaudid  Last refill: due soon Lockout period: every 4 hours   Indications: chronic pain, Disp: , Rfl:     Sodium Chloride Flush (sodium chloride 0.9 % flush) 0.9 % solution, Infuse 20 mL into a venous catheter Daily., Disp: 600 mL, Rfl: 2    tiZANidine (ZANAFLEX) 4 MG tablet, Take 1 tablet by mouth Daily. Indications: Musculoskeletal Pain, Disp: , Rfl:     budesonide-formoterol (SYMBICORT) 160-4.5 MCG/ACT inhaler, Inhale 2 puffs 2 (Two) Times a Day. (Patient not taking: Reported on 2/27/2024),  Disp: 10.2 g, Rfl: 1    cetirizine (zyrTEC) 10 MG tablet, Take 1 tablet by mouth Daily. (Patient not taking: Reported on 12/18/2023), Disp: 30 tablet, Rfl: 0    Continuous Blood Gluc Sensor (FreeStyle Zahira 2 Sensor) misc, Use 1 each 4 (Four) Times a Day Before Meals & at Bedtime. Dx code: E11.65 (Patient not taking: Reported on 2/27/2024), Disp: 2 each, Rfl: 2    dasatinib (SPRYCEL) 80 MG chemo tablet, Take 1 tablet by mouth Daily. (Patient not taking: Reported on 12/18/2023), Disp: 30 tablet, Rfl: 5    furosemide (LASIX) 40 MG tablet, Take 1 tablet by mouth Daily. Indications: Edema (Patient not taking: Reported on 2/27/2024), Disp: , Rfl:     Insulin Lispro, 1 Unit Dial, (HumaLOG KwikPen) 100 UNIT/ML solution pen-injector, Inject 7 Units under the skin into the appropriate area as directed 3 (Three) Times a Day Before Meals. Dx code: E11.65 (Patient not taking: Reported on 2/27/2024), Disp: 15 mL, Rfl: 2    metoclopramide (Reglan) 10 MG tablet, Take 10 mg by mouth 4 (Four) Times a Day. Indications: Hiccups that are Hard to Cure, Nausea and Vomiting (Patient not taking: Reported on 2/27/2024), Disp: , Rfl:     midodrine (PROAMATINE) 10 MG tablet, Take 1 tablet by mouth Every 8 (Eight) Hours. (Patient not taking: Reported on 2/27/2024), Disp: 90 tablet, Rfl: 0    mirtazapine (REMERON) 15 MG tablet, TAKE 1 TABLET BY MOUTH EVERY NIGHT AT BEDTIME. INDICATIONS: MAJOR DEPRESSIVE DISORDER (Patient not taking: Reported on 2/27/2024), Disp: 90 tablet, Rfl: 3    ondansetron ODT (ZOFRAN-ODT) 4 MG disintegrating tablet, Place 1 tablet on the tongue Every 8 (Eight) Hours As Needed for Nausea or Vomiting. Indications: Nausea and Vomiting (Patient not taking: Reported on 2/27/2024), Disp: 30 tablet, Rfl: 2    predniSONE (DELTASONE) 20 MG tablet, Take 1 tablet by mouth Daily With Breakfast. (Patient not taking: Reported on 12/18/2023), Disp: 3 tablet, Rfl: 0    prochlorperazine (COMPAZINE) 10 MG tablet, Take 1 tablet by mouth  Every 6 (Six) Hours As Needed for Nausea or Vomiting. (Patient not taking: Reported on 2/27/2024), Disp: 30 tablet, Rfl: 2    prochlorperazine (COMPAZINE) 25 MG suppository, Insert 1 suppository into the rectum Every 12 (Twelve) Hours As Needed for Nausea or Vomiting. Use when unable to take oral compazine  Indications: Nausea and Vomiting (Patient not taking: Reported on 2/27/2024), Disp: 10 suppository, Rfl: 2    promethazine (PHENERGAN) 12.5 MG tablet, Take 1 tablet by mouth Every 6 (Six) Hours As Needed for Nausea or Vomiting. (Patient not taking: Reported on 2/27/2024), Disp: 30 tablet, Rfl: 0  No current facility-administered medications for this visit.    Facility-Administered Medications Ordered in Other Visits:     acetaminophen (TYLENOL) tablet 650 mg, 650 mg, Oral, Once, Denisha Gill APRN    sodium chloride 0.9 % flush 20 mL, 20 mL, Intravenous, PRN, Florentin, Meghann Bradshaw MD, 20 mL at 02/27/24 1530    No Known Allergies    Family History   Problem Relation Age of Onset    Diabetes Mother     Diabetes Maternal Grandmother     Heart attack Maternal Grandmother     Stroke Maternal Grandmother        Cancer-related family history is not on file.    Social History     Tobacco Use    Smoking status: Never    Smokeless tobacco: Never   Vaping Use    Vaping Use: Never used   Substance Use Topics    Alcohol use: No    Drug use: No        I have reviewed and confirmed the accuracy of the patient's history: Chief complaint, HPI, ROS, and Subjective as entered by the MA/LPN/RN. Meghann Lerma MD 03/05/24        SUBJECTIVE:    Nieves Mc reports a pain score of 8.  Given her pain assessment as noted, treatment options were discussed and the following options were decided upon as a follow-up plan to address the patient's pain: continuation of current treatment plan for pain    .   Continues to have significant GI issues.  She has been referred to GI office but has not made appointment yet.  She is  "not taking Sprycel regularly.    Nieves Mc reports a pain score of 8.  Given her pain assessment as noted, treatment options were discussed and the following options were decided upon as a follow-up plan to address the patient's pain: continuation of current treatment plan for pain. Patient is established with pain management.     REVIEW OF SYSTEMS:    Review of Systems   Constitutional: Negative for chills and fever.   HENT: Negative for ear pain, mouth sores, nosebleeds and sore throat.    Eyes: Negative for photophobia and visual disturbance.   Respiratory: Negative for wheezing and stridor.    Cardiovascular: Negative for chest pain and palpitations.   Gastrointestinal: Negative for abdominal pain, diarrhea,.  She has nausea  Endocrine: Negative for cold intolerance and heat intolerance.   Genitourinary: Negative for dysuria and hematuria.   Musculoskeletal: Negative for joint swelling and neck stiffness.  Positive for back and leg pain.   Skin: Negative for color change and rash.   Neurological: Negative for seizures and syncope.   Hematological: Negative for adenopathy.        No obvious bleeding   Psychiatric/Behavioral: Negative for agitation, confusion and hallucinations. Positive for insomnia, anxiety, depression.      OBJECTIVE:    Vitals:    02/27/24 1527   BP: 135/82   Pulse: 104   SpO2: 98%   Weight: Comment: Patient was huritng and could not stand   Height: 162.6 cm (64\")   PainSc:   8   PainLoc: Knee                   Body mass index is 27.29 kg/m².    ECOG  (1) Restricted in physically strenuous activity, ambulatory and able to do work of light nature    Physical Exam  Vitals and nursing note reviewed.   Constitutional:       General: She is not in acute distress.     Appearance: She is not diaphoretic.   HENT:      Head: Normocephalic and atraumatic.   Eyes:      General: No scleral icterus.        Right eye: No discharge.         Left eye: No discharge.      Conjunctiva/sclera: Conjunctivae " normal.   Neck:      Thyroid: No thyromegaly.   Cardiovascular:      Rate and Rhythm: Normal rate and regular rhythm.      Heart sounds: Normal heart sounds. No friction rub. No gallop.    Pulmonary:      Effort: Pulmonary effort is normal. No respiratory distress.      Breath sounds: No stridor. No wheezing.   Abdominal:      General: Bowel sounds are normal.      Palpations: Abdomen is soft. There is no mass.      Tenderness: There is no abdominal tenderness. There is no guarding or rebound.   Musculoskeletal:         General: No tenderness. Normal range of motion.      Cervical back: Normal range of motion and neck supple.   Lymphadenopathy:      Cervical: No cervical adenopathy.   Skin:     General: Skin is warm.      Findings: No erythema or rash.   Neurological:      Mental Status: She is alert and oriented to person, place, and time.      Motor: No abnormal muscle tone.   Psychiatric:         Behavior: Behavior    I have reexamined the patient and the results are consistent with the previously documented exam. Meghann Lerma MD      RECENT LABS    WBC   Date Value Ref Range Status   02/27/2024 16.43 (H) 3.40 - 10.80 10*3/mm3 Final     RBC   Date Value Ref Range Status   02/27/2024 3.05 (L) 3.77 - 5.28 10*6/mm3 Final     Hemoglobin   Date Value Ref Range Status   02/27/2024 8.6 (L) 12.0 - 15.9 g/dL Final     Hematocrit   Date Value Ref Range Status   02/27/2024 27.1 (L) 34.0 - 46.6 % Final     MCV   Date Value Ref Range Status   02/27/2024 88.9 79.0 - 97.0 fL Final     MCH   Date Value Ref Range Status   02/27/2024 28.2 26.6 - 33.0 pg Final     MCHC   Date Value Ref Range Status   02/27/2024 31.7 31.5 - 35.7 g/dL Final     RDW   Date Value Ref Range Status   02/27/2024 16.8 (H) 12.3 - 15.4 % Final     RDW-SD   Date Value Ref Range Status   02/27/2024 50.4 37.0 - 54.0 fl Final     MPV   Date Value Ref Range Status   02/27/2024 10.5 6.0 - 12.0 fL Final     Platelets   Date Value Ref Range Status    02/27/2024 235 140 - 450 10*3/mm3 Final     Neutrophil %   Date Value Ref Range Status   02/27/2024 76.8 (H) 42.7 - 76.0 % Final     Lymphocyte %   Date Value Ref Range Status   02/27/2024 13.6 (L) 19.6 - 45.3 % Final     Monocyte %   Date Value Ref Range Status   02/27/2024 6.2 5.0 - 12.0 % Final     Eosinophil %   Date Value Ref Range Status   02/27/2024 0.9 0.3 - 6.2 % Final     Basophil %   Date Value Ref Range Status   02/27/2024 2.5 (H) 0.0 - 1.5 % Final     External Neutrophils Abs   Date Value Ref Range Status   11/30/2022 7.6 (H) 1.7 - 6.0 x10(3)/ul Final     Neutrophils, Absolute   Date Value Ref Range Status   02/27/2024 12.62 (H) 1.70 - 7.00 10*3/mm3 Final     Lymphocytes, Absolute   Date Value Ref Range Status   02/27/2024 2.23 0.70 - 3.10 10*3/mm3 Final     Monocytes, Absolute   Date Value Ref Range Status   02/27/2024 1.02 (H) 0.10 - 0.90 10*3/mm3 Final     Eosinophils, Absolute   Date Value Ref Range Status   02/27/2024 0.15 0.00 - 0.40 10*3/mm3 Final     Basophils, Absolute   Date Value Ref Range Status   02/27/2024 0.41 (H) 0.00 - 0.20 10*3/mm3 Final     nRBC   Date Value Ref Range Status   02/21/2024 1.0 (H) 0.0 - 0.2 /100 WBC Final   12/04/2023 0.3 (H) 0.0 - 0.2 /100 WBC Final       Lab Results   Component Value Date    GLUCOSE 448 (C) 02/21/2024    BUN 26 (H) 02/21/2024    CREATININE 1.08 (H) 02/21/2024    EGFRIFNONA 133 02/02/2022    BCR 24.1 02/21/2024    K 4.7 02/21/2024    CO2 24.0 02/21/2024    CALCIUM 11.3 (H) 02/21/2024    ALBUMIN 4.4 01/29/2024    LABIL2 1.0 04/18/2019    AST 24 01/29/2024    ALT 43 (H) 01/29/2024           ASSESSMENT    Accelerated phase CML, status post bone marrow biopsy on 11/3/2022.  Patient was on ponatinib but developed significant pancytopenia and therefore treatment has been held.  Patient was not able to tolerate ponatinib despite significantly low doses at 10 mg every other day.  She is not now willing to retry this.  Will therefore restart ponatinib 10 mg  every other day  Discontinue Sprycel due to lack of tolerance  Hypercalcemia likely secondary to malignancy  Poor IV access, patient declines port but is open to PICC line placement therefore will order  Nausea vomiting secondary to Sprycel, likely she also has gastroparesis from longstanding uncontrolled diabetes: Will refer to GI for evaluation  Pain management issues, patient initially refusing to follow-up with her physician at [facility].  Patient has been seen by her pain physician.  She will continue follow-up with our pain physicians  Pancytopenia: Reviewed her CBC  BCR ABL kinase mutation assay was negative  Cardiomyopathy her most recent echo shows an EF of 44% which is an improvement from 26 to 30%.  Patient encouraged to follow-up with her cardiologist  Anxiety: Continue anxiolytics  Pancytopenia secondary to med, CML not requiring blood transfusion now.  For now continue to observe   CML in chronic phase with no significant hematologic or molecular or cytogenetic response on   Imatinib.  ABL kinase mutational analysis was negative.  We  have represcribed to Tasigna 300 mg twice a day.  Patient has been noncompliant with treatments and ended up in the hospital with hyperleukocytosis, peripheral blood blasts.  Bone marrow biopsy suggest that she remains in chronic phase.  Continue to Tasiigna at the current doses.  Hydroxyurea has been discontinued.  Uncontrolled diabetes type 1, followed at the [facility] by Dr. Calles  Chronic anemia: Stable, multifactorial from CML, to Tasigna, hydroxyurea.  This has improved  Insomnia  Situational anxiety, not suicidal.  Continue Xanax 0.5 mg once daily  History of medical noncompliance  Pulmonary embolism: Xarelto restarted  Recent COVID-19 pneumonia  History of prolonged QTC      Plans    Discontinue Sprycel  Ponatinib 10 mg every other day  Bone scan ordered  And encouraged to receive Zometa as recommended as well as IV  fluids  PICC line placement  Continue Phenergan for nausea.  Change Zofran to 4 mg every 8 hours as needed for nausea to decrease risk of prolonged QT interval.    Patient yet to establish with new PCP.  She was given numbers to Dr. Christina Stewart's office to call  Request additional records from [facility]  DC trazodone 25 mg due to QT prolongation  Continue to follow-up with cardiology in regards to dosing of her diuretics.  EKG reviewed.  She has slight prolongation of QT.  We will discontinue Zofran and Phenergan and use Compazine as needed for nausea.  Prescription has been sent to her pharmacy  Refill Xanax  All questions answered  Continue to follow-up with PCP per routine  Follow-up with pain management per routine  Follow-up 4 weeks  All questions answered      I spent 40 total minutes, face-to-face, caring for Hope today. 90% of this time involved counseling and/or coordination of care as documented within this note.

## 2024-02-28 ENCOUNTER — LAB REQUISITION (OUTPATIENT)
Dept: LAB | Facility: HOSPITAL | Age: 49
End: 2024-02-28
Payer: MEDICARE

## 2024-02-28 ENCOUNTER — HOME CARE VISIT (OUTPATIENT)
Dept: HOME HEALTH SERVICES | Facility: HOME HEALTHCARE | Age: 49
End: 2024-02-28
Payer: COMMERCIAL

## 2024-02-28 ENCOUNTER — SPECIALTY PHARMACY (OUTPATIENT)
Dept: PHARMACY | Facility: HOSPITAL | Age: 49
End: 2024-02-28
Payer: MEDICARE

## 2024-02-28 VITALS
OXYGEN SATURATION: 96 % | HEART RATE: 98 BPM | RESPIRATION RATE: 17 BRPM | TEMPERATURE: 98.2 F | SYSTOLIC BLOOD PRESSURE: 122 MMHG | DIASTOLIC BLOOD PRESSURE: 74 MMHG

## 2024-02-28 DIAGNOSIS — C92.10 CHRONIC MYELOID LEUKEMIA, BCR/ABL-POSITIVE, NOT HAVING ACHIEVED REMISSION: ICD-10-CM

## 2024-02-28 LAB
ANISOCYTOSIS BLD QL: ABNORMAL
BASOPHILS # BLD MANUAL: 0.17 10*3/MM3 (ref 0–0.2)
BASOPHILS NFR BLD MANUAL: 1 % (ref 0–1.5)
BLASTS NFR BLD MANUAL: 2 % (ref 0–0)
DEPRECATED RDW RBC AUTO: 50.3 FL (ref 37–54)
EOSINOPHIL # BLD MANUAL: 0.17 10*3/MM3 (ref 0–0.4)
EOSINOPHIL NFR BLD MANUAL: 1 % (ref 0.3–6.2)
ERYTHROCYTE [DISTWIDTH] IN BLOOD BY AUTOMATED COUNT: 16.6 % (ref 12.3–15.4)
HCT VFR BLD AUTO: 25.6 % (ref 34–46.6)
HGB BLD-MCNC: 8.3 G/DL (ref 12–15.9)
LARGE PLATELETS: ABNORMAL
LYMPHOCYTES # BLD MANUAL: 2.87 10*3/MM3 (ref 0.7–3.1)
LYMPHOCYTES NFR BLD MANUAL: 3 % (ref 5–12)
MCH RBC QN AUTO: 28.2 PG (ref 26.6–33)
MCHC RBC AUTO-ENTMCNC: 32.4 G/DL (ref 31.5–35.7)
MCV RBC AUTO: 87.2 FL (ref 79–97)
METAMYELOCYTES NFR BLD MANUAL: 4 % (ref 0–0)
MONOCYTES # BLD: 0.51 10*3/MM3 (ref 0.1–0.9)
MYELOCYTES NFR BLD MANUAL: 10 % (ref 0–0)
NEUTROPHILS # BLD AUTO: 10.31 10*3/MM3 (ref 1.7–7)
NEUTROPHILS NFR BLD MANUAL: 58 % (ref 42.7–76)
NEUTS BAND NFR BLD MANUAL: 3 % (ref 0–5)
NRBC SPEC MANUAL: 1 /100 WBC (ref 0–0.2)
OVALOCYTES BLD QL SMEAR: ABNORMAL
PLATELET # BLD AUTO: 270 10*3/MM3 (ref 140–450)
PMV BLD AUTO: 9.3 FL (ref 6–12)
POLYCHROMASIA BLD QL SMEAR: ABNORMAL
PROMYELOCYTES NFR BLD MANUAL: 1 % (ref 0–0)
RBC # BLD AUTO: 2.94 10*6/MM3 (ref 3.77–5.28)
SCAN SLIDE: NORMAL
SPHEROCYTES BLD QL SMEAR: ABNORMAL
VARIANT LYMPHS NFR BLD MANUAL: 17 % (ref 19.6–45.3)
WBC MORPH BLD: NORMAL
WBC NRBC COR # BLD AUTO: 16.9 10*3/MM3 (ref 3.4–10.8)

## 2024-02-28 PROCEDURE — G0299 HHS/HOSPICE OF RN EA 15 MIN: HCPCS

## 2024-02-28 PROCEDURE — 85025 COMPLETE CBC W/AUTO DIFF WBC: CPT | Performed by: INTERNAL MEDICINE

## 2024-02-28 NOTE — HOME HEALTH
Routine Visit Note: Pt went to cancer cener appt yesterday. Labs were obtained but no fluids or meds given at appt. Pts PICC line dressing was not changed at appt. PICC dressing changed at this time, no new issues noted. Pt denies any med changes at this time. Pt is to talk with her family about starting other chemo med that caused her a lot of issues in the past again. Pt has not made any decisions at this time. Pt wondering why fluids cannot be done at home if needed. SN contacted [facility] to see about obtaining orders.     Skill/education provided: PICC dressing change, labs as ordered, follow up on MD appt    Patient/caregiver response: pt tolerates without issue    Plan for next visit:   CP assess  PICC dressing change  Labs as ordered  assess orders for fluids  assess pain and falls

## 2024-02-28 NOTE — PROGRESS NOTES
Re: Refills of Oral Specialty Medication - Iclusig (ponatinib)    Drug-Drug Interactions: The current medication list was reviewed and there are no relevant drug-drug interactions with the specialty medication.  Medication Allergies: The patient has NKDA  Review of Labs/Dose Adjustments:  Change in treatment due to noncompliance     Drug: Iclusig (ponatinib)  Strength: 10 mg  Directions: Take 1 tablet by mouth  every other day.  Take with or without food. Swallow whole, do not crush or chew.  Quantity: 30  Refills: 2  Pharmacy prescription sent to:  [facility] upon MD bernard CALHOUN, PharmD     2/28/2024  08:45 EST

## 2024-03-01 ENCOUNTER — DOCUMENTATION (OUTPATIENT)
Dept: ONCOLOGY | Facility: CLINIC | Age: 49
End: 2024-03-01
Payer: MEDICARE

## 2024-03-01 DIAGNOSIS — C92.10 CML (CHRONIC MYELOCYTIC LEUKEMIA): Primary | ICD-10-CM

## 2024-03-01 DIAGNOSIS — E83.52 HYPERCALCEMIA: ICD-10-CM

## 2024-03-01 NOTE — PROGRESS NOTES
OSW was asked to arrange transportation for pt's 3/4 appt.  OSW arranged and contacted pt to inform.  Pt v/u.

## 2024-03-04 ENCOUNTER — HOSPITAL ENCOUNTER (OUTPATIENT)
Dept: ONCOLOGY | Facility: HOSPITAL | Age: 49
Discharge: HOME | End: 2024-03-04
Admitting: INTERNAL MEDICINE
Payer: MEDICARE

## 2024-03-04 VITALS
HEART RATE: 112 BPM | DIASTOLIC BLOOD PRESSURE: 78 MMHG | HEIGHT: 64 IN | SYSTOLIC BLOOD PRESSURE: 132 MMHG | BODY MASS INDEX: 27.29 KG/M2

## 2024-03-04 DIAGNOSIS — E86.0 DEHYDRATION: ICD-10-CM

## 2024-03-04 DIAGNOSIS — E83.52 HYPERCALCEMIA: Primary | ICD-10-CM

## 2024-03-04 DIAGNOSIS — Z45.2 PICC (PERIPHERALLY INSERTED CENTRAL CATHETER) FLUSH: ICD-10-CM

## 2024-03-04 LAB
ALBUMIN SERPL-MCNC: 3.9 G/DL (ref 3.5–5.2)
ALBUMIN/GLOB SERPL: 1.2 G/DL
ALP BLD-CCNC: 212 U/L (ref 42–141)
ALP SERPL-CCNC: 248 U/L (ref 39–117)
ALT SERPL W P-5'-P-CCNC: 23 U/L (ref 1–33)
ANION GAP SERPL CALCULATED.3IONS-SCNC: 12 MMOL/L (ref 5–15)
AST SERPL-CCNC: 18 U/L (ref 1–32)
BASOPHILS # BLD AUTO: 0.67 10*3/MM3 (ref 0–0.2)
BASOPHILS NFR BLD AUTO: 3.9 % (ref 0–1.5)
BILIRUB SERPL-MCNC: 0.5 MG/DL (ref 0–1.2)
BUN BLDA-MCNC: 17 MG/DL (ref 7–22)
BUN SERPL-MCNC: 17 MG/DL (ref 6–20)
BUN/CREAT SERPL: 20.2 (ref 7–25)
CALCIUM BLD QL: 10.5 MG/DL (ref 8–10.3)
CALCIUM SPEC-SCNC: 10.4 MG/DL (ref 8.6–10.5)
CHLORIDE BLDA-SCNC: 102 MMOL/L (ref 98–108)
CHLORIDE SERPL-SCNC: 99 MMOL/L (ref 98–107)
CO2 BLDA-SCNC: 28 MMOL/L (ref 18–33)
CO2 SERPL-SCNC: 25 MMOL/L (ref 22–29)
CREAT BLDA-MCNC: 0.7 MG/DL (ref 0.6–1.2)
CREAT SERPL-MCNC: 0.84 MG/DL (ref 0.57–1)
DEPRECATED RDW RBC AUTO: 53.5 FL (ref 37–54)
EGFRCR SERPLBLD CKD-EPI 2021: 106.8 ML/MIN/1.73
EGFRCR SERPLBLD CKD-EPI 2021: 85.8 ML/MIN/1.73
EOSINOPHIL # BLD AUTO: 0.19 10*3/MM3 (ref 0–0.4)
EOSINOPHIL NFR BLD AUTO: 1.1 % (ref 0.3–6.2)
ERYTHROCYTE [DISTWIDTH] IN BLOOD BY AUTOMATED COUNT: 17.3 % (ref 12.3–15.4)
GLOBULIN UR ELPH-MCNC: 3.3 GM/DL
GLUCOSE BLDC GLUCOMTR-MCNC: 384 MG/DL (ref 73–118)
GLUCOSE SERPL-MCNC: 374 MG/DL (ref 65–99)
HCT VFR BLD AUTO: 26.3 % (ref 34–46.6)
HGB BLD-MCNC: 8.1 G/DL (ref 12–15.9)
LYMPHOCYTES # BLD AUTO: 2.65 10*3/MM3 (ref 0.7–3.1)
LYMPHOCYTES NFR BLD AUTO: 15.3 % (ref 19.6–45.3)
MAGNESIUM SERPL-MCNC: 1.5 MG/DL (ref 1.6–2.6)
MCH RBC QN AUTO: 27.7 PG (ref 26.6–33)
MCHC RBC AUTO-ENTMCNC: 30.8 G/DL (ref 31.5–35.7)
MCV RBC AUTO: 90.1 FL (ref 79–97)
MONOCYTES # BLD AUTO: 1.72 10*3/MM3 (ref 0.1–0.9)
MONOCYTES NFR BLD AUTO: 9.9 % (ref 5–12)
NEUTROPHILS NFR BLD AUTO: 12.11 10*3/MM3 (ref 1.7–7)
NEUTROPHILS NFR BLD AUTO: 69.8 % (ref 42.7–76)
PHOSPHATE SERPL-MCNC: 3.5 MG/DL (ref 2.5–4.5)
PLATELET # BLD AUTO: 245 10*3/MM3 (ref 140–450)
PMV BLD AUTO: 10.3 FL (ref 6–12)
POC ALBUMIN: 3.3 G/L (ref 3.3–5.5)
POC ALT (SGPT): 28 U/L (ref 10–47)
POC AST (SGOT): 21 U/L (ref 11–38)
POC TOTAL BILIRUBIN: 0.8 MG/DL (ref 0.2–1.6)
POC TOTAL PROTEIN: 7.2 G/DL (ref 6.4–8.1)
POTASSIUM BLDA-SCNC: 4.2 MMOL/L (ref 3.6–5.1)
POTASSIUM SERPL-SCNC: 4.2 MMOL/L (ref 3.5–5.2)
PROT SERPL-MCNC: 7.2 G/DL (ref 6–8.5)
RBC # BLD AUTO: 2.92 10*6/MM3 (ref 3.77–5.28)
SODIUM BLD-SCNC: 147 MMOL/L (ref 128–145)
SODIUM SERPL-SCNC: 136 MMOL/L (ref 136–145)
WBC NRBC COR # BLD AUTO: 17.34 10*3/MM3 (ref 3.4–10.8)

## 2024-03-04 PROCEDURE — 25010000002 ZOLEDRONIC ACID 4 MG/100ML SOLUTION: Performed by: INTERNAL MEDICINE

## 2024-03-04 PROCEDURE — 83735 ASSAY OF MAGNESIUM: CPT | Performed by: INTERNAL MEDICINE

## 2024-03-04 PROCEDURE — 96360 HYDRATION IV INFUSION INIT: CPT

## 2024-03-04 PROCEDURE — 84100 ASSAY OF PHOSPHORUS: CPT | Performed by: INTERNAL MEDICINE

## 2024-03-04 PROCEDURE — 80053 COMPREHEN METABOLIC PANEL: CPT | Performed by: INTERNAL MEDICINE

## 2024-03-04 PROCEDURE — 80053 COMPREHEN METABOLIC PANEL: CPT

## 2024-03-04 PROCEDURE — 96367 TX/PROPH/DG ADDL SEQ IV INF: CPT

## 2024-03-04 PROCEDURE — 96374 THER/PROPH/DIAG INJ IV PUSH: CPT

## 2024-03-04 PROCEDURE — 96361 HYDRATE IV INFUSION ADD-ON: CPT

## 2024-03-04 PROCEDURE — 85025 COMPLETE CBC W/AUTO DIFF WBC: CPT | Performed by: INTERNAL MEDICINE

## 2024-03-04 PROCEDURE — 25810000003 SODIUM CHLORIDE 0.9 % SOLUTION: Performed by: INTERNAL MEDICINE

## 2024-03-04 RX ORDER — SODIUM CHLORIDE 0.9 % (FLUSH) 0.9 %
20 SYRINGE (ML) INJECTION AS NEEDED
Status: DISCONTINUED | OUTPATIENT
Start: 2024-03-04 | End: 2024-03-05 | Stop reason: HOSPADM

## 2024-03-04 RX ORDER — SODIUM CHLORIDE 0.9 % (FLUSH) 0.9 %
20 SYRINGE (ML) INJECTION AS NEEDED
OUTPATIENT
Start: 2024-03-04

## 2024-03-04 RX ORDER — ZOLEDRONIC ACID 0.04 MG/ML
4 INJECTION, SOLUTION INTRAVENOUS ONCE
Status: COMPLETED | OUTPATIENT
Start: 2024-03-04 | End: 2024-03-04

## 2024-03-04 RX ORDER — SODIUM CHLORIDE 9 MG/ML
20 INJECTION, SOLUTION INTRAVENOUS ONCE
Status: DISCONTINUED | OUTPATIENT
Start: 2024-03-04 | End: 2024-03-05 | Stop reason: HOSPADM

## 2024-03-04 RX ADMIN — ZOLEDRONIC ACID 4 MG: 0.04 INJECTION, SOLUTION INTRAVENOUS at 13:50

## 2024-03-04 RX ADMIN — SODIUM CHLORIDE 1500 ML: 9 INJECTION, SOLUTION INTRAVENOUS at 13:09

## 2024-03-04 RX ADMIN — Medication 20 ML: at 15:27

## 2024-03-05 ENCOUNTER — SPECIALTY PHARMACY (OUTPATIENT)
Dept: PHARMACY | Facility: HOSPITAL | Age: 49
End: 2024-03-05
Payer: MEDICARE

## 2024-03-05 LAB
INTERPRETATION: POSITIVE
LAB DIRECTOR NAME PROVIDER: ABNORMAL
LABORATORY COMMENT REPORT: ABNORMAL
REF LAB TEST METHOD: ABNORMAL
T(ABL1,BCR)B2A2/CONTROL BLD/T: 6.97 %
T(ABL1,BCR)B3A2/CONTROL BLD/T: ABNORMAL %
T(ABL1,BCR)E1A2/CONTROL BLD/T: 0.38 %

## 2024-03-06 ENCOUNTER — HOME CARE VISIT (OUTPATIENT)
Dept: HOME HEALTH SERVICES | Facility: HOME HEALTHCARE | Age: 49
End: 2024-03-06
Payer: COMMERCIAL

## 2024-03-06 ENCOUNTER — LAB REQUISITION (OUTPATIENT)
Dept: LAB | Facility: HOSPITAL | Age: 49
End: 2024-03-06
Payer: MEDICARE

## 2024-03-06 VITALS
TEMPERATURE: 98.4 F | DIASTOLIC BLOOD PRESSURE: 72 MMHG | HEART RATE: 98 BPM | RESPIRATION RATE: 17 BRPM | OXYGEN SATURATION: 97 % | SYSTOLIC BLOOD PRESSURE: 122 MMHG

## 2024-03-06 DIAGNOSIS — E11.42 TYPE 2 DIABETES MELLITUS WITH DIABETIC POLYNEUROPATHY: ICD-10-CM

## 2024-03-06 LAB
ANISOCYTOSIS BLD QL: ABNORMAL
BASOPHILS # BLD MANUAL: 0.85 10*3/MM3 (ref 0–0.2)
BASOPHILS NFR BLD MANUAL: 6 % (ref 0–1.5)
DEPRECATED RDW RBC AUTO: 52.5 FL (ref 37–54)
EOSINOPHIL # BLD MANUAL: 0.28 10*3/MM3 (ref 0–0.4)
EOSINOPHIL NFR BLD MANUAL: 2 % (ref 0.3–6.2)
ERYTHROCYTE [DISTWIDTH] IN BLOOD BY AUTOMATED COUNT: 16.9 % (ref 12.3–15.4)
HCT VFR BLD AUTO: 23.3 % (ref 34–46.6)
HGB BLD-MCNC: 7.4 G/DL (ref 12–15.9)
LYMPHOCYTES # BLD MANUAL: 1.69 10*3/MM3 (ref 0.7–3.1)
LYMPHOCYTES NFR BLD MANUAL: 8 % (ref 5–12)
MCH RBC QN AUTO: 26.6 PG (ref 26.6–33)
MCHC RBC AUTO-ENTMCNC: 31.7 G/DL (ref 31.5–35.7)
MCV RBC AUTO: 84.1 FL (ref 79–97)
METAMYELOCYTES NFR BLD MANUAL: 5 % (ref 0–0)
MONOCYTES # BLD: 1.13 10*3/MM3 (ref 0.1–0.9)
MYELOCYTES NFR BLD MANUAL: 8 % (ref 0–0)
NEUTROPHILS # BLD AUTO: 8.18 10*3/MM3 (ref 1.7–7)
NEUTROPHILS NFR BLD MANUAL: 43 % (ref 42.7–76)
NEUTS BAND NFR BLD MANUAL: 15 % (ref 0–5)
NRBC SPEC MANUAL: 5 /100 WBC (ref 0–0.2)
PATHOLOGY REVIEW: YES
PLAT MORPH BLD: NORMAL
PLATELET # BLD AUTO: 215 10*3/MM3 (ref 140–450)
PMV BLD AUTO: 8.6 FL (ref 6–12)
PROMYELOCYTES NFR BLD MANUAL: 1 % (ref 0–0)
RBC # BLD AUTO: 2.77 10*6/MM3 (ref 3.77–5.28)
SCAN SLIDE: NORMAL
VARIANT LYMPHS NFR BLD MANUAL: 12 % (ref 19.6–45.3)
WBC MORPH BLD: NORMAL
WBC NRBC COR # BLD AUTO: 14.1 10*3/MM3 (ref 3.4–10.8)

## 2024-03-06 PROCEDURE — 85025 COMPLETE CBC W/AUTO DIFF WBC: CPT | Performed by: INTERNAL MEDICINE

## 2024-03-06 PROCEDURE — G0299 HHS/HOSPICE OF RN EA 15 MIN: HCPCS

## 2024-03-07 ENCOUNTER — SPECIALTY PHARMACY (OUTPATIENT)
Dept: PHARMACY | Facility: HOSPITAL | Age: 49
End: 2024-03-07
Payer: MEDICARE

## 2024-03-07 LAB
LAB AP CASE REPORT: NORMAL
PATH REPORT.FINAL DX SPEC: NORMAL

## 2024-03-07 NOTE — PROGRESS NOTES
Specialty Pharmacy Note: Lab Review    Labs reviewed and hgb=7.4.  Attempted to reach patient via telephone to see is she is symptomatic but had to leave  with call back to [phone].  Per Denisha TRAMMELL if patient is symptomatic will need transfusion.     Thanks,    Ros CALHOUN, PharmD

## 2024-03-07 NOTE — HOME HEALTH
Routine Visit Note: Patient reports she was given IV fluids on Monday at Dr Lerma's office and she is still feeling all blowed up from this. Encourage patient to perform regular accuchecks since she is unable to obtain a new sensor for her freestyle. Patient states her eye sight is so poor she can not see to do this.    Skill/education provided: Cardiopulmonary assessment, falls assessment, pain assessment, medication review and teaching, PICC line dressing change, blood draw for ordered labs    Patient/caregiver response: Patient tolerated assessment well with no complaints or issues    Plan for next visit: Cardiopulmonary assessment, falls assessment, pain assessment, medication review and teaching,blood draw for ordered labs and PICC line dressing change    Other pertinent info: None

## 2024-03-07 NOTE — PROGRESS NOTES
Specialty Pharmacy Note     Called [facility] to check on status of prescription Iclusig and was told insurance is not letting them fill the medication. I called [place] member services line and talk to Pavithra about [place] pharmacy benefits. Her plan does not cover Iclusig, so she will need to sign up for patient assistance with manufacture to receive the drug.    Jyotsna Anton - CARE COORDINATOR  3/7/2024  11:01 EST

## 2024-03-08 ENCOUNTER — HOSPITAL ENCOUNTER (OUTPATIENT)
Dept: INFUSION THERAPY | Facility: HOSPITAL | Age: 49
Discharge: HOME | End: 2024-03-08
Payer: MEDICARE

## 2024-03-08 ENCOUNTER — HOSPITAL ENCOUNTER (OUTPATIENT)
Dept: NUCLEAR MEDICINE | Facility: HOSPITAL | Age: 49
Discharge: HOME | End: 2024-03-08
Payer: MEDICARE

## 2024-03-08 ENCOUNTER — SPECIALTY PHARMACY (OUTPATIENT)
Dept: PHARMACY | Facility: HOSPITAL | Age: 49
End: 2024-03-08
Payer: MEDICARE

## 2024-03-08 DIAGNOSIS — Z51.89 ENCOUNTER FOR BLOOD TRANSFUSION: Primary | ICD-10-CM

## 2024-03-08 DIAGNOSIS — D64.9 ANEMIA, UNSPECIFIED TYPE: Primary | ICD-10-CM

## 2024-03-08 DIAGNOSIS — C95.90 LEUKEMIA NOT HAVING ACHIEVED REMISSION, UNSPECIFIED LEUKEMIA TYPE: ICD-10-CM

## 2024-03-08 DIAGNOSIS — C92.10 CML (CHRONIC MYELOCYTIC LEUKEMIA): ICD-10-CM

## 2024-03-08 DIAGNOSIS — D64.9 ANEMIA, UNSPECIFIED TYPE: ICD-10-CM

## 2024-03-08 DIAGNOSIS — F41.9 ANXIETY: ICD-10-CM

## 2024-03-08 LAB
ABO GROUP BLD: NORMAL
ANTI-E: NORMAL
BASOPHILS # BLD MANUAL: 1.12 10*3/MM3 (ref 0–0.2)
BASOPHILS NFR BLD MANUAL: 6 % (ref 0–1.5)
BB HOLD TUBE: NORMAL
BLASTS NFR BLD MANUAL: 5 % (ref 0–0)
BLD GP AB SCN SERPL QL: POSITIVE
DEPRECATED RDW RBC AUTO: 52.5 FL (ref 37–54)
EOSINOPHIL # BLD MANUAL: 0.19 10*3/MM3 (ref 0–0.4)
EOSINOPHIL NFR BLD MANUAL: 1 % (ref 0.3–6.2)
ERYTHROCYTE [DISTWIDTH] IN BLOOD BY AUTOMATED COUNT: 17.4 % (ref 12.3–15.4)
HCT VFR BLD AUTO: 24.3 % (ref 34–46.6)
HGB BLD-MCNC: 7.7 G/DL (ref 12–15.9)
LYMPHOCYTES # BLD MANUAL: 1.86 10*3/MM3 (ref 0.7–3.1)
LYMPHOCYTES NFR BLD MANUAL: 3 % (ref 5–12)
MCH RBC QN AUTO: 26.9 PG (ref 26.6–33)
MCHC RBC AUTO-ENTMCNC: 31.5 G/DL (ref 31.5–35.7)
MCV RBC AUTO: 85.4 FL (ref 79–97)
METAMYELOCYTES NFR BLD MANUAL: 9 % (ref 0–0)
MONOCYTES # BLD: 0.56 10*3/MM3 (ref 0.1–0.9)
MYELOCYTES NFR BLD MANUAL: 9 % (ref 0–0)
NEUTROPHILS # BLD AUTO: 10.04 10*3/MM3 (ref 1.7–7)
NEUTROPHILS NFR BLD MANUAL: 48 % (ref 42.7–76)
NEUTS BAND NFR BLD MANUAL: 6 % (ref 0–5)
NRBC SPEC MANUAL: 4 /100 WBC (ref 0–0.2)
PLAT MORPH BLD: NORMAL
PLATELET # BLD AUTO: 231 10*3/MM3 (ref 140–450)
PMV BLD AUTO: 7.9 FL (ref 6–12)
POLYCHROMASIA BLD QL SMEAR: ABNORMAL
PROLYMPHOCYTES NFR BLD MANUAL: 1 % (ref 0–0)
PROMYELOCYTES NFR BLD MANUAL: 2 % (ref 0–0)
RBC # BLD AUTO: 2.84 10*6/MM3 (ref 3.77–5.28)
RH BLD: POSITIVE
SCAN SLIDE: NORMAL
T&S EXPIRATION DATE: NORMAL
TOXIC GRANULATION: ABNORMAL
VARIANT LYMPHS NFR BLD MANUAL: 10 % (ref 19.6–45.3)
WBC NRBC COR # BLD AUTO: 18.6 10*3/MM3 (ref 3.4–10.8)

## 2024-03-08 PROCEDURE — 86870 RBC ANTIBODY IDENTIFICATION: CPT | Performed by: NURSE PRACTITIONER

## 2024-03-08 PROCEDURE — 0 TECHNETIUM MEDRONATE KIT: Performed by: INTERNAL MEDICINE

## 2024-03-08 PROCEDURE — 36592 COLLECT BLOOD FROM PICC: CPT

## 2024-03-08 PROCEDURE — 86850 RBC ANTIBODY SCREEN: CPT | Performed by: NURSE PRACTITIONER

## 2024-03-08 PROCEDURE — A9503 TC99M MEDRONATE: HCPCS | Performed by: INTERNAL MEDICINE

## 2024-03-08 PROCEDURE — 86900 BLOOD TYPING SEROLOGIC ABO: CPT | Performed by: NURSE PRACTITIONER

## 2024-03-08 PROCEDURE — 85007 BL SMEAR W/DIFF WBC COUNT: CPT

## 2024-03-08 PROCEDURE — 78306 BONE IMAGING WHOLE BODY: CPT

## 2024-03-08 PROCEDURE — 85025 COMPLETE CBC W/AUTO DIFF WBC: CPT

## 2024-03-08 PROCEDURE — 86901 BLOOD TYPING SEROLOGIC RH(D): CPT | Performed by: NURSE PRACTITIONER

## 2024-03-08 RX ORDER — SODIUM CHLORIDE 9 MG/ML
250 INJECTION, SOLUTION INTRAVENOUS AS NEEDED
Status: DISCONTINUED | OUTPATIENT
Start: 2024-03-08 | End: 2024-03-10 | Stop reason: HOSPADM

## 2024-03-08 RX ORDER — ALPRAZOLAM 0.5 MG/1
0.5 TABLET ORAL NIGHTLY PRN
Qty: 30 TABLET | Refills: 0 | Status: SHIPPED | OUTPATIENT
Start: 2024-03-08

## 2024-03-08 RX ORDER — TC 99M MEDRONATE 20 MG/10ML
25.9 INJECTION, POWDER, LYOPHILIZED, FOR SOLUTION INTRAVENOUS
Status: COMPLETED | OUTPATIENT
Start: 2024-03-08 | End: 2024-03-08

## 2024-03-08 RX ORDER — SODIUM CHLORIDE 9 MG/ML
250 INJECTION, SOLUTION INTRAVENOUS AS NEEDED
Status: CANCELLED | OUTPATIENT
Start: 2024-03-08

## 2024-03-08 RX ADMIN — TC 99M MEDRONATE 25.9 MILLICURIE: 20 INJECTION, POWDER, LYOPHILIZED, FOR SOLUTION INTRAVENOUS at 10:18

## 2024-03-08 NOTE — CODE DOCUMENTATION
Blood bank called up to let us know that blood would not be ready on the patient until tomorrow.  Offered for patient to come in the morning during our office hours to receive the blood, but patient stated could not get transportation here until tomorrow night.  Offered her resources for her to be picked up, but patient refuses.  Offered for patient to come in Monday afternoon, but she stated she had to stay with her care giver and care giver could not bring her. She stated just to cancel her blood, that she would not be in to get it.  Notified [facility] that patient  is not going to be coming back in for blood transfusion.

## 2024-03-08 NOTE — PROGRESS NOTES
Specialty Pharmacy Note: Lab review    Spoke to patient today via telephone to assess for side effects of low hgb.  Patient reports she has no SOA or palpitations but is more tired than usual.  Per Denisha TRAMMELL, patient to receive 1 unit of PRBC.  Dr. Lerma's team coordinating transfusion for patient.    Thanks,    Ros CALHOUN, PharmD

## 2024-03-13 ENCOUNTER — HOME CARE VISIT (OUTPATIENT)
Dept: HOME HEALTH SERVICES | Facility: HOME HEALTHCARE | Age: 49
End: 2024-03-13
Payer: COMMERCIAL

## 2024-03-13 ENCOUNTER — LAB REQUISITION (OUTPATIENT)
Dept: LAB | Facility: HOSPITAL | Age: 49
End: 2024-03-13
Payer: MEDICARE

## 2024-03-13 ENCOUNTER — SPECIALTY PHARMACY (OUTPATIENT)
Dept: PHARMACY | Facility: HOSPITAL | Age: 49
End: 2024-03-13
Payer: MEDICARE

## 2024-03-13 VITALS
SYSTOLIC BLOOD PRESSURE: 124 MMHG | OXYGEN SATURATION: 93 % | DIASTOLIC BLOOD PRESSURE: 70 MMHG | TEMPERATURE: 98.2 F | HEART RATE: 98 BPM | RESPIRATION RATE: 17 BRPM

## 2024-03-13 DIAGNOSIS — E11.42 TYPE 2 DIABETES MELLITUS WITH DIABETIC POLYNEUROPATHY: ICD-10-CM

## 2024-03-13 LAB
ANISOCYTOSIS BLD QL: ABNORMAL
BASOPHILS # BLD MANUAL: 0.8 10*3/MM3 (ref 0–0.2)
BASOPHILS NFR BLD MANUAL: 4 % (ref 0–1.5)
BLASTS NFR BLD MANUAL: 2 % (ref 0–0)
DEPRECATED RDW RBC AUTO: 56.2 FL (ref 37–54)
EOSINOPHIL # BLD MANUAL: 0.2 10*3/MM3 (ref 0–0.4)
EOSINOPHIL NFR BLD MANUAL: 1 % (ref 0.3–6.2)
ERYTHROCYTE [DISTWIDTH] IN BLOOD BY AUTOMATED COUNT: 17.4 % (ref 12.3–15.4)
HCT VFR BLD AUTO: 24.2 % (ref 34–46.6)
HGB BLD-MCNC: 7.1 G/DL (ref 12–15.9)
HYPOCHROMIA BLD QL: ABNORMAL
LARGE PLATELETS: ABNORMAL
LYMPHOCYTES # BLD MANUAL: 2 10*3/MM3 (ref 0.7–3.1)
LYMPHOCYTES NFR BLD MANUAL: 6 % (ref 5–12)
MACROCYTES BLD QL SMEAR: ABNORMAL
MCH RBC QN AUTO: 26.4 PG (ref 26.6–33)
MCHC RBC AUTO-ENTMCNC: 29.3 G/DL (ref 31.5–35.7)
MCV RBC AUTO: 90 FL (ref 79–97)
METAMYELOCYTES NFR BLD MANUAL: 5 % (ref 0–0)
MICROCYTES BLD QL: ABNORMAL
MONOCYTES # BLD: 1.2 10*3/MM3 (ref 0.1–0.9)
MYELOCYTES NFR BLD MANUAL: 7 % (ref 0–0)
NEUTROPHILS # BLD AUTO: 12.79 10*3/MM3 (ref 1.7–7)
NEUTROPHILS NFR BLD MANUAL: 48 % (ref 42.7–76)
NEUTS BAND NFR BLD MANUAL: 16 % (ref 0–5)
NRBC SPEC MANUAL: 9 /100 WBC (ref 0–0.2)
PATHOLOGY REVIEW: YES
PLATELET # BLD AUTO: 210 10*3/MM3 (ref 140–450)
PMV BLD AUTO: 10.4 FL (ref 6–12)
POLYCHROMASIA BLD QL SMEAR: ABNORMAL
PROMYELOCYTES NFR BLD MANUAL: 1 % (ref 0–0)
RBC # BLD AUTO: 2.69 10*6/MM3 (ref 3.77–5.28)
VARIANT LYMPHS NFR BLD MANUAL: 10 % (ref 19.6–45.3)
WBC MORPH BLD: NORMAL
WBC NRBC COR # BLD AUTO: 19.98 10*3/MM3 (ref 3.4–10.8)

## 2024-03-13 PROCEDURE — G0299 HHS/HOSPICE OF RN EA 15 MIN: HCPCS

## 2024-03-13 PROCEDURE — 85025 COMPLETE CBC W/AUTO DIFF WBC: CPT | Performed by: INTERNAL MEDICINE

## 2024-03-13 NOTE — PROGRESS NOTES
Specialty Pharmacy Patient Management Program  Oncology Initial Assessment       Nieves Mc is a 48 y.o. female with CML (chronic myelocytic leukemia) seen by an Oncology provider and enrolled in the Oncology Patient Management program offered by [facility].  An initial outreach was conducted, including assessment of therapy appropriateness and specialty medication education for Iclusig (ponatinib). The patient was introduced to services offered by [facility], including: regular assessments, refill coordination, curbside pick-up or mail order delivery options, prior authorization maintenance, and financial assistance programs as applicable. The patient was also provided with contact information for the pharmacy team.     Regimen: Iclusig (ponatinib) 10 mg every other day with goal to titrate to 10 mg daily based on pt tolerability    - Of note, pt starting a very low dose due to severe thrombocytopenia from medication in the past and pt does not want to start at any higher dose than 10 mg every other day at this time.  - Medication was reviewed as pt has been on this medication in the past. She was familiar with many of the SE and knows she is being closely monitored via labs and home health nurse.    Start date of oral specialty medication: As soon as oral specialty medication is available. - Likely week of 3/18/24    Relevant Past Medical History, Comorbidities, and Vaccines  Relevant medical history and concomitant health conditions were discussed with the patient. The patient's chart has been reviewed for relevant past medical history and comorbid health conditions and updated as necessary.  Vaccines are coordinated by the patient's oncologist and primary care provider.  Past Medical History:   Diagnosis Date    Adverse effect of chemotherapy 11/08/2023    Bone pain     Chronic constipation 07/07/2023    COVID-19 virus infection 01/12/2022    Diabetes mellitus      Diabetic gastroparesis 07/07/2023    Extremity pain     Carlin. legs pain    Fall during current hospitalization 06/25/2023    Leg pain     left leg greater    Migraine     Petechial rash 11/08/2023    Pubic ramus fracture, left, closed, initial encounter 06/25/2023    Pulmonary embolism     Vision loss     doing surgery     Social History     Socioeconomic History    Marital status: Legally    Tobacco Use    Smoking status: Never    Smokeless tobacco: Never   Vaping Use    Vaping status: Never Used   Substance and Sexual Activity    Alcohol use: No    Drug use: No    Sexual activity: Defer       Allergies  Known allergies and reactions were discussed with the patient. The patient's chart has been reviewed for allergy information and updated as necessary.   No Known Allergies     Current Medication List  This medication list has been reviewed with the patient and evaluated for any interactions or necessary modifications/recommendations, and updated to include all prescription medications, OTC medications, and supplements the patient is currently taking.  This list reflects what is contained in the patient's profile, which has also been marked as reviewed to communicate to other providers it is the most up to date version of the patient's current medication therapy.   Prior to Admission medications    Medication Sig Start Date End Date Taking? Authorizing Provider   acetaminophen (TYLENOL) 325 MG tablet Take 2 tablets by mouth Every 4 (Four) Hours As Needed for Moderate Pain. Indications: Fever, Pain 1/2/24   Meghann Lerma MD   ALPRAZolam (Xanax) 0.5 MG tablet Take 1 tablet by mouth At Night As Needed for Anxiety. Indications: Feeling Anxious 3/8/24   Denisha Gill APRN   budesonide-formoterol (SYMBICORT) 160-4.5 MCG/ACT inhaler Inhale 2 puffs 2 (Two) Times a Day.  Patient not taking: Reported on 2/27/2024 8/5/22   Villa Patel MD   cetirizine (zyrTEC) 10 MG tablet Take 1 tablet by mouth  Daily.  Patient not taking: Reported on 12/18/2023 11/9/23   Yuniel Yancey MD   Continuous Blood Gluc Sensor (FreeStyle Zahira 2 Sensor) misc Use 1 each 4 (Four) Times a Day Before Meals & at Bedtime. Dx code: E11.65  Patient not taking: Reported on 2/27/2024 11/8/23   Yuniel Yancey MD   dapagliflozin Propanediol (Farxiga) 10 MG tablet Take 10 mg (1 tablet) by mouth Daily. Dx code: E11.65 11/8/23   Yuniel Yancey MD   dasatinib (SPRYCEL) 80 MG chemo tablet Take 1 tablet by mouth Daily.  Patient not taking: Reported on 12/18/2023 12/1/23   Meghann Lerma MD   furosemide (LASIX) 40 MG tablet Take 1 tablet by mouth Daily. Indications: Edema  Patient not taking: Reported on 2/27/2024 12/19/22   Melecio Almanzar MD   gabapentin (Neurontin) 800 MG tablet Take 1 tablet by mouth 3 (Three) Times a Day. Ordered through [facility]  Indications: Diabetes with Nerve Disease 1/28/24   Melecio Almanzar MD   Insulin Glargine (LANTUS SOLOSTAR) 100 UNIT/ML injection pen Inject 25 Units under the skin into the appropriate area as directed Every Night. Dx code: E11.65 11/8/23   Yuniel Yancey MD   Insulin Lispro, 1 Unit Dial, (HumaLOG KwikPen) 100 UNIT/ML solution pen-injector Inject 7 Units under the skin into the appropriate area as directed 3 (Three) Times a Day Before Meals. Dx code: E11.65  Patient not taking: Reported on 2/27/2024 11/8/23   Yuniel Yancey MD   Insulin Pen Needle (Pen Needles) 32G X 4 MM misc Use 1 each 4 (Four) Times a Day Before Meals & at Bedtime. Dx code: E11.65 11/8/23   Yuniel Yancey MD   metoclopramide (Reglan) 10 MG tablet Take 10 mg by mouth 4 (Four) Times a Day. Indications: Hiccups that are Hard to Cure, Nausea and Vomiting  Patient not taking: Reported on 2/27/2024 7/9/23   Melecio Almanzar MD   midodrine (PROAMATINE) 10 MG tablet Take 1 tablet by mouth Every 8 (Eight) Hours.  Patient not taking: Reported on 2/27/2024 5/31/23   Martha Shields, DO    mirtazapine (REMERON) 15 MG tablet TAKE 1 TABLET BY MOUTH EVERY NIGHT AT BEDTIME. INDICATIONS: MAJOR DEPRESSIVE DISORDER  Patient not taking: Reported on 2/27/2024 2/21/24   Meghann Lerma MD   ondansetron ODT (ZOFRAN-ODT) 4 MG disintegrating tablet Place 1 tablet on the tongue Every 8 (Eight) Hours As Needed for Nausea or Vomiting. Indications: Nausea and Vomiting  Patient not taking: Reported on 2/27/2024 10/27/23   Meghann Lerma MD   pain patient supplied pump 0.375 mL/hr by Intrathecal route Daily. Active pump  Prescriber: Dr. hSelton - pain management   Med name/ concentration: Dilaudid   Last refill: due soon  Lockout period: every 4 hours   Indications: chronic pain 5/17/23   Provider, MD Melecio   PONATinib HCl (Iclusig) 10 MG tablet Take 10 mg by mouth Every Other Day. Take with or without food.  Swallow whole, do not crush or chew. 3/6/24   Meghann Lerma MD   predniSONE (DELTASONE) 20 MG tablet Take 1 tablet by mouth Daily With Breakfast.  Patient not taking: Reported on 12/18/2023 11/9/23   Yuniel Yancey MD   prochlorperazine (COMPAZINE) 10 MG tablet Take 1 tablet by mouth Every 6 (Six) Hours As Needed for Nausea or Vomiting.  Patient not taking: Reported on 2/27/2024 10/27/23   Meghann Lerma MD   prochlorperazine (COMPAZINE) 25 MG suppository Insert 1 suppository into the rectum Every 12 (Twelve) Hours As Needed for Nausea or Vomiting. Use when unable to take oral compazine  Indications: Nausea and Vomiting  Patient not taking: Reported on 2/27/2024 1/2/24   Meghann Lerma MD   promethazine (PHENERGAN) 12.5 MG tablet Take 1 tablet by mouth Every 6 (Six) Hours As Needed for Nausea or Vomiting.  Patient not taking: Reported on 2/27/2024 10/16/23   Meghann Lerma MD   Sodium Chloride Flush (sodium chloride 0.9 % flush) 0.9 % solution Infuse 20 mL into a venous catheter Daily. 1/30/24   Meghann Lerma MD   tiZANidine (ZANAFLEX) 4 MG  tablet Take 1 tablet by mouth Daily. Indications: Musculoskeletal Pain 5/10/23   Provider, MD Melecio   ALPRAZolam (Xanax) 0.5 MG tablet Take 1 tablet by mouth At Night As Needed for Anxiety. Indications: Feeling Anxious 1/29/24 3/8/24  Denisha Gill APRN   mirtazapine (REMERON) 15 MG tablet Take 1 tablet by mouth every night at bedtime. Indications: Major Depressive Disorder 1/2/24 2/21/24  Meghann Lerma MD   Nystatin (magic mouthwash) Swish and spit 5 mL 4 (Four) Times a Day. 164 cc Nystatin  140 cc Benadryl  96 cc Viscous Xylocaine 10/27/23 2/27/24  Meghann Lerma MD   oxymetazoline (AFRIN) 0.05 % nasal spray 2 sprays into the nostril(s) as directed by provider 2 (Two) Times a Day.  Patient not taking: Reported on 2/27/2024 11/8/23 2/27/24  Yuniel Yancey MD       Drug Interactions  Reviewed concomitant medications, allergies, labs, comorbidities/medical history, quality of life, and immunization history.   Drug-drug interactions noted and discussed during education: no significant drug interactions noted. Of note, pt reports not taking medication listed on current medication list. Reviewed those pt reports she is taking. Reminded the patient to let us know before making any changes or starting any new prescription or OTC medications so we can first assess drug interactions.  Drug-food interactions noted and discussed during education. Patient was instructed to avoid eating grapefruit and drinking grapefruit juice    Recommended Medications Assessment  Bone Health (such as calcium/vitamin D, bisphosphonate, RANKL inhibitor) - Pt prescribed Zoledronic acid x1 dose on 2/22/24, but has not received yet.  VTE prophylaxis - Not Indicated   Prophylactic antimicrobials - Not Indicated   Tumor lysis syndrome prophylaxis - Risk for TLS Low.  Adequate hydration was discussed during education.    Relevant Laboratory Values  Lab Results   Component Value Date    GLUCOSE 374 (H) 03/04/2024     CALCIUM 10.4 03/04/2024     03/04/2024    K 4.2 03/04/2024    CO2 25.0 03/04/2024    CL 99 03/04/2024    BUN 17 03/04/2024    CREATININE 0.70 03/04/2024    EGFRIFNONA 133 02/02/2022    BCR 20.2 03/04/2024    ANIONGAP 12.0 03/04/2024     Lab Results   Component Value Date    WBC 19.98 (H) 03/13/2024    RBC 2.69 (L) 03/13/2024    HGB 7.1 (L) 03/13/2024    HCT 24.2 (L) 03/13/2024    MCV 90.0 03/13/2024    MCH 26.4 (L) 03/13/2024    MCHC 29.3 (L) 03/13/2024    RDW 17.4 (H) 03/13/2024    RDWSD 56.2 (H) 03/13/2024    MPV 10.4 03/13/2024     03/13/2024    NEUTRORELPCT 69.8 03/04/2024    LYMPHORELPCT 15.3 (L) 03/04/2024    MONORELPCT 9.9 03/04/2024    EOSRELPCT 1.1 03/04/2024    BASORELPCT 3.9 (H) 03/04/2024    NEUTROABS 12.79 (H) 03/13/2024    LYMPHSABS 2.65 03/04/2024    MONOSABS 1.72 (H) 03/04/2024    EOSABS 0.20 03/13/2024    BASOSABS 0.80 (H) 03/13/2024    NRBC 9.0 (H) 03/13/2024       The above labs have been reviewed. No dose adjustments are needed for the oral specialty medication(s) based on the labs.    Initial Education Provided for Specialty Medication  The patient has been provided with the following education. All questions and concerns have been addressed prior to the patient receiving the medication, and the patient has verbalized understanding of the education and any materials provided.  Additional patient education shall be provided and documented upon request by the patient, provider or payer.      Provided patient with:   Education sheets about the medication, 24-hour clinic phone number and my contact information and instructions to call should additional questions arise.  (provided at previous visit)    Medication Education Sheets Provided: (select all that apply)  Oral Specialty Medication: Iclusig (ponatinib)    Other Education Sheets Provided: (select all that apply)  Home Instructions and after hours contact information, Tips From Your Chemotherapy Nurse, Understanding Your Blood,  Cancer Related Fatigue, Fluids and Dehydration, Improving Your Veins, Mouth Care, Office Constipation and Diarrhea Protocols, Nausea and Appetite     TOPICS COMMENTS   Storage and Handling of Oral Specialty Medication Store in the original container, in a dry location out of direct sunlight, and out of reach of children or pets. and Store at room temperature.  Discussed safe handling and what to do with any unused medication.   Administration of Oral Specialty Medication Take with food at the same time(s) each day. and Do not crush or chew tablets.   Adherence to Oral Specialty Regimen and Handling Missed Doses Patient is likely to have good treatment adherence; reinforced the importance of adherence. Reviewed how to address missed doses and encouraged the patient to let us know of any missed doses.   Anemia: role of RBC, cause, s/s, ways to manage, role of transfusion Reviewed the role of RBC and the use of transfusions if hemoglobin decreases too much.  Patient to notify us if she experiences shortness of breath, dizziness, or palpitations.  Also let patient know that she could feel more tired than usual and to try to stay active, but rest if she needs to.    Thrombocytopenia: role of platelet, cause, s/s, ways to prevent bleeding, things to avoid, when to seek help Reviewed the role of platelets in blood clotting and when to call clinic (bloody nose that bleeds for 5 minutes despite pressure, a cut that won't stop bleeding despite pressure, gums that bleed excessively with brushing or flossing). Recommended using an electric razor, soft bristle toothbrush, and blowing your nose gently.    Neutropenia: role of WBC, cause, infection precautions, s/s of infection, when to call MD Reviewed the role of WBC, good infection prevention practices, and when to call the clinic (temperature 100.4F, sore throat, burning urination, etc).   Nutrition and Appetite Changes:  importance of maintaining healthy diet & weight, ways  to manage to improve intake, dietary consult, exercise regimen, electrolyte and/or blood glucose abnormalities Electrolyte Abnormalities:  Explained that the oncology therapy may lead to abnormalities in electrolytes, specifically: high glucose, lipase, and increased TG levels. And decreased phosphorus and calcium levels.  Pt concerned she is unable to check her BG due to not having refill on her Freestyle Zahira sensor. Discussed importance with pt that she must find a PCP to help manage her other chronic disease states. Pt reports she has struggled with finding a PCP who will take her. Sent message to provider to see if this was something she felt comfortable with refilling   Diarrhea: causes, s/s of dehydration, ways to manage, dietary changes, when to call MD    Constipation: causes, ways to manage, dietary changes, when to call MD Provided supplementary handout with instructions for use of docusate and other OTC therapies to manage constipation.  Patient was instructed to call us if medications aren't working.   Nausea/Vomiting: cause, use of antiemetics, dietary changes, when to call MD Emetic risk: Low-Minimal  PRN home meds: Ondansetron and prochlorperazine  Pharmacy home meds sent to: Pt has medication at home      Instructed the patient to take a dose of the PRN medication at the first onset of nausea and if it's not working to call us for additional medications.  Also provided non-drug measures to mitigate nausea.   Mouth Sores: causes, oral care, ways to manage    Alopecia: cause, ways to manage, resources    Nervous System Changes: causes, s/s, neuropathies, cognitive changes, ways to manage Discussed fatigue and importance of continuing ADL. Discussed possibility of headache. Pt reports she had headache from medication previously and managed SE. Discussed possibility of less SE as pt is starting at very low dose.   Pain: causes, ways to manage Pt has baseline pain and currently sees pain management.    Skin/Nail Changes: cause, s/s, ways to manage Immunotherapy: Discussed the potential for a rash or itchy skin from immunotherapy, offered nonpharmacologic prevention strategies, and instructed the patient to call if a rash develops and worsens.   Organ Toxicities: cause, s/s, need for diagnostic tests, labs, when to notify MD Discussed potential effects on organ systems, monitoring, diagnostic tests, labs, and when to notify the MD. Discussed the signs/symptoms of the following:  ocular toxicity (blurred vision, dry eyes or eye pain), cardiotoxicity, delayed wound healing, hepatotoxicity, hypertension - recommended close monitoring of blood pressure, provided BP log, and explained how to track values, and skin changes.   Survivorship: distress, distress assessment, secondary malignancies, early/late effects, follow-up, social issues, social support    Miscellaneous Financial Issues: None at this time  Lab Draws:  Weekly CBC at this time and monthly CMP   Blood Clots: Explained the rare, but possible risk of DVT or PE.  Reviewed the signs and symptoms of VTE and stressed the urgency to call 911 immediately.   Infertility and Sexuality:  causes, fertility preservation options, sexuality changes, ways to manage, importance of birth control The patient is not of childbearing potential.   Home Care: how to manage bodily fluids Counseled on management of soiled linens and proper flush technique.  Discussed how to manage all the side effects at home and advised when to contact the MD office     Adherence and Self-Administration  Barriers to Patient Adherence and/or Self-Administration: No issues with self-administration. Pt has had issues with adherence in the past due to GI issues related to previous medications.  Methods for Supporting Patient Adherence and/or Self-Administration:  Discussed use of family support and use of home health nurse to assist with any pt needs  Expected duration of therapy: Until disease  progression or intolerable toxicity    Goals of Therapy  Patient Goals of Therapy:   Consistently take medications as prescribed  Patient will adhere to medication regimen  Patient will report any medication side effects to healthcare provider  Clinical Goals:    Goals Addressed Today    None       Support patient understanding of medication regimen  Ensure patient knows the pharmacy contact information  Schedule regular follow-up to monitor the treatment serious adverse events  Schedule regular follow-up to confirm medication adherence  Schedule regular follow-up to monitor disease progression or stability      Reassessment Plan & Follow-Up  Pharmacist to perform regular reassessments no more than (6) months from the previous assessment.  Welcome information and patient satisfaction survey to be sent by retail team with patient's initial fill.  Care Coordinator to set up future refill outreaches, coordinate prescription delivery, and escalate clinical questions to pharmacist.     Additional Plans, Therapy Recommendations or Therapy Problems to Be Addressed: F/u with pt next week to determine actual start date     Attestation  I attest the patient was actively involved in and has agreed to the above plan of care. If the prescribed therapy is at any point deemed not appropriate based on the current or future assessments, a consultation will be initiated with the patient's specialty care provider to determine the best course of action. The revised plan of therapy will be documented along with any required assessments and/or additional patient education provided.     Amy Aldridge, Pharm.D.    Date and Time: 3/13/2024  15:54 EDT

## 2024-03-14 LAB
LAB AP CASE REPORT: NORMAL
PATH REPORT.FINAL DX SPEC: NORMAL

## 2024-03-14 NOTE — HOME HEALTH
Routine Visit Note: Patient reports no new medications, problems or issues since nurse was here last. Patient still reports that she is not checking her blood sugars.    Skill/education provided: Cardiopulmonary assessment, PICC assessment, dressing change, blood draw and cap change, falls assessment, pain assessment, medication review and teaching, blood sugar assessment    Patient/caregiver response: Patient tolerated assessment well with no complaints or issues    Plan for next visit: Cardiopulmonary assessment, wound assessment and care, falls assessment, pain assessment, medication review and teaching, blood sugar assessment    Other pertinent info: None

## 2024-03-15 ENCOUNTER — SPECIALTY PHARMACY (OUTPATIENT)
Dept: PHARMACY | Facility: HOSPITAL | Age: 49
End: 2024-03-15
Payer: MEDICARE

## 2024-03-15 DIAGNOSIS — D64.9 SYMPTOMATIC ANEMIA: Primary | ICD-10-CM

## 2024-03-15 NOTE — PROGRESS NOTES
Specialty Pharmacy Note     Initial fill of Ponatinib (Iclusig) 10mg was dispensed from [facility] on 3/13/24 to arrive on 3/15/24 verified by Lianet Anton - CARE COORDINATOR  3/15/2024  12:53 EDT

## 2024-03-19 ENCOUNTER — APPOINTMENT (OUTPATIENT)
Dept: GENERAL RADIOLOGY | Facility: HOSPITAL | Age: 49
DRG: 193 | End: 2024-03-19
Payer: MEDICARE

## 2024-03-19 ENCOUNTER — HOSPITAL ENCOUNTER (OUTPATIENT)
Dept: INFUSION THERAPY | Facility: HOSPITAL | Age: 49
Discharge: HOME | End: 2024-03-19
Payer: MEDICARE

## 2024-03-19 ENCOUNTER — HOSPITAL ENCOUNTER (INPATIENT)
Facility: HOSPITAL | Age: 49
LOS: 6 days | Discharge: HOME | DRG: 193 | End: 2024-03-25
Attending: EMERGENCY MEDICINE | Admitting: HOSPITALIST
Payer: MEDICARE

## 2024-03-19 ENCOUNTER — APPOINTMENT (OUTPATIENT)
Dept: CT IMAGING | Facility: HOSPITAL | Age: 49
DRG: 193 | End: 2024-03-19
Payer: MEDICARE

## 2024-03-19 VITALS
TEMPERATURE: 98.3 F | OXYGEN SATURATION: 95 % | RESPIRATION RATE: 16 BRPM | HEART RATE: 112 BPM | DIASTOLIC BLOOD PRESSURE: 96 MMHG | SYSTOLIC BLOOD PRESSURE: 150 MMHG

## 2024-03-19 DIAGNOSIS — D64.9 ANEMIA, UNSPECIFIED TYPE: ICD-10-CM

## 2024-03-19 DIAGNOSIS — R60.0 BILATERAL LOWER EXTREMITY EDEMA: ICD-10-CM

## 2024-03-19 DIAGNOSIS — R09.02 HYPOXIA: ICD-10-CM

## 2024-03-19 DIAGNOSIS — D64.9 SYMPTOMATIC ANEMIA: ICD-10-CM

## 2024-03-19 DIAGNOSIS — J18.9 PNEUMONIA OF RIGHT LUNG DUE TO INFECTIOUS ORGANISM, UNSPECIFIED PART OF LUNG: Primary | ICD-10-CM

## 2024-03-19 LAB
ABO GROUP BLD: NORMAL
ALBUMIN SERPL-MCNC: 3.6 G/DL (ref 3.5–5.2)
ALBUMIN/GLOB SERPL: 1 G/DL
ALP SERPL-CCNC: 607 U/L (ref 39–117)
ALT SERPL W P-5'-P-CCNC: 17 U/L (ref 1–33)
ANION GAP SERPL CALCULATED.3IONS-SCNC: 12 MMOL/L (ref 5–15)
ANISOCYTOSIS BLD QL: ABNORMAL
ANISOCYTOSIS BLD QL: ABNORMAL
AST SERPL-CCNC: 20 U/L (ref 1–32)
B PARAPERT DNA SPEC QL NAA+PROBE: NOT DETECTED
B PERT DNA SPEC QL NAA+PROBE: NOT DETECTED
BASO STIPL COARSE BLD QL SMEAR: ABNORMAL
BASOPHILS # BLD MANUAL: 2.24 10*3/MM3 (ref 0–0.2)
BASOPHILS # BLD MANUAL: 2.52 10*3/MM3 (ref 0–0.2)
BASOPHILS NFR BLD MANUAL: 7 % (ref 0–1.5)
BASOPHILS NFR BLD MANUAL: 7 % (ref 0–1.5)
BB HOLD TUBE: NORMAL
BILIRUB SERPL-MCNC: 1.9 MG/DL (ref 0–1.2)
BLASTS NFR BLD MANUAL: 1 % (ref 0–0)
BLASTS NFR BLD MANUAL: 2 % (ref 0–0)
BLD GP AB SCN SERPL QL: NEGATIVE
BUN SERPL-MCNC: 17 MG/DL (ref 6–20)
BUN/CREAT SERPL: 20.2 (ref 7–25)
C PNEUM DNA NPH QL NAA+NON-PROBE: NOT DETECTED
CALCIUM SPEC-SCNC: 7.6 MG/DL (ref 8.6–10.5)
CHLORIDE SERPL-SCNC: 100 MMOL/L (ref 98–107)
CO2 SERPL-SCNC: 21 MMOL/L (ref 22–29)
CREAT SERPL-MCNC: 0.84 MG/DL (ref 0.57–1)
D DIMER PPP FEU-MCNC: 1.23 MG/L (FEU) (ref 0–0.5)
D-LACTATE SERPL-SCNC: <0.3 MMOL/L (ref 0.3–2)
DEPRECATED RDW RBC AUTO: 56.6 FL (ref 37–54)
DEPRECATED RDW RBC AUTO: 58.6 FL (ref 37–54)
EGFRCR SERPLBLD CKD-EPI 2021: 85.8 ML/MIN/1.73
EOSINOPHIL # BLD MANUAL: 0.36 10*3/MM3 (ref 0–0.4)
EOSINOPHIL NFR BLD MANUAL: 1 % (ref 0.3–6.2)
ERYTHROCYTE [DISTWIDTH] IN BLOOD BY AUTOMATED COUNT: 17.6 % (ref 12.3–15.4)
ERYTHROCYTE [DISTWIDTH] IN BLOOD BY AUTOMATED COUNT: 17.8 % (ref 12.3–15.4)
FLUAV SUBTYP SPEC NAA+PROBE: NOT DETECTED
FLUBV RNA ISLT QL NAA+PROBE: NOT DETECTED
GLOBULIN UR ELPH-MCNC: 3.5 GM/DL
GLUCOSE BLDC GLUCOMTR-MCNC: 348 MG/DL (ref 70–105)
GLUCOSE SERPL-MCNC: 451 MG/DL (ref 65–99)
HADV DNA SPEC NAA+PROBE: NOT DETECTED
HCOV 229E RNA SPEC QL NAA+PROBE: NOT DETECTED
HCOV HKU1 RNA SPEC QL NAA+PROBE: NOT DETECTED
HCOV NL63 RNA SPEC QL NAA+PROBE: NOT DETECTED
HCOV OC43 RNA SPEC QL NAA+PROBE: NOT DETECTED
HCT VFR BLD AUTO: 22.2 % (ref 34–46.6)
HCT VFR BLD AUTO: 25.6 % (ref 34–46.6)
HCT VFR BLD AUTO: 27.6 % (ref 34–46.6)
HGB BLD-MCNC: 6.5 G/DL (ref 12–15.9)
HGB BLD-MCNC: 7.8 G/DL (ref 12–15.9)
HGB BLD-MCNC: 7.9 G/DL (ref 12–15.9)
HMPV RNA NPH QL NAA+NON-PROBE: NOT DETECTED
HOLD SPECIMEN: NORMAL
HOLD SPECIMEN: NORMAL
HPIV1 RNA ISLT QL NAA+PROBE: NOT DETECTED
HPIV2 RNA SPEC QL NAA+PROBE: NOT DETECTED
HPIV3 RNA NPH QL NAA+PROBE: NOT DETECTED
HPIV4 P GENE NPH QL NAA+PROBE: NOT DETECTED
HYPOCHROMIA BLD QL: ABNORMAL
LARGE PLATELETS: ABNORMAL
LARGE PLATELETS: ABNORMAL
LYMPHOCYTES # BLD MANUAL: 4.67 10*3/MM3 (ref 0.7–3.1)
LYMPHOCYTES # BLD MANUAL: 7.35 10*3/MM3 (ref 0.7–3.1)
LYMPHOCYTES NFR BLD MANUAL: 8 % (ref 5–12)
LYMPHOCYTES NFR BLD MANUAL: 9 % (ref 5–12)
M PNEUMO IGG SER IA-ACNC: NOT DETECTED
MCH RBC QN AUTO: 26 PG (ref 26.6–33)
MCH RBC QN AUTO: 26.4 PG (ref 26.6–33)
MCHC RBC AUTO-ENTMCNC: 28.6 G/DL (ref 31.5–35.7)
MCHC RBC AUTO-ENTMCNC: 29.3 G/DL (ref 31.5–35.7)
MCV RBC AUTO: 88.8 FL (ref 79–97)
MCV RBC AUTO: 92.3 FL (ref 79–97)
METAMYELOCYTES NFR BLD MANUAL: 5 % (ref 0–0)
METAMYELOCYTES NFR BLD MANUAL: 9 % (ref 0–0)
MICROCYTES BLD QL: ABNORMAL
MONOCYTES # BLD: 2.56 10*3/MM3 (ref 0.1–0.9)
MONOCYTES # BLD: 3.24 10*3/MM3 (ref 0.1–0.9)
MYELOCYTES NFR BLD MANUAL: 8 % (ref 0–0)
NEUTROPHILS # BLD AUTO: 15.97 10*3/MM3 (ref 1.7–7)
NEUTROPHILS # BLD AUTO: 18.34 10*3/MM3 (ref 1.7–7)
NEUTROPHILS NFR BLD MANUAL: 38 % (ref 42.7–76)
NEUTROPHILS NFR BLD MANUAL: 43 % (ref 42.7–76)
NEUTS BAND NFR BLD MANUAL: 12 % (ref 0–5)
NEUTS BAND NFR BLD MANUAL: 8 % (ref 0–5)
NRBC SPEC MANUAL: 4 /100 WBC (ref 0–0.2)
NRBC SPEC MANUAL: 7 /100 WBC (ref 0–0.2)
NT-PROBNP SERPL-MCNC: ABNORMAL PG/ML (ref 0–450)
PLATELET # BLD AUTO: 188 10*3/MM3 (ref 140–450)
PLATELET # BLD AUTO: 209 10*3/MM3 (ref 140–450)
PMV BLD AUTO: 10.3 FL (ref 6–12)
PMV BLD AUTO: 10.4 FL (ref 6–12)
POIKILOCYTOSIS BLD QL SMEAR: ABNORMAL
POLYCHROMASIA BLD QL SMEAR: ABNORMAL
POLYCHROMASIA BLD QL SMEAR: ABNORMAL
POTASSIUM SERPL-SCNC: 5.1 MMOL/L (ref 3.5–5.2)
PROLYMPHOCYTES NFR BLD MANUAL: 2 % (ref 0–0)
PROMYELOCYTES NFR BLD MANUAL: 1 % (ref 0–0)
PROMYELOCYTES NFR BLD MANUAL: 3 % (ref 0–0)
PROT SERPL-MCNC: 7.1 G/DL (ref 6–8.5)
RBC # BLD AUTO: 2.5 10*6/MM3 (ref 3.77–5.28)
RBC # BLD AUTO: 2.99 10*6/MM3 (ref 3.77–5.28)
RH BLD: POSITIVE
RHINOVIRUS RNA SPEC NAA+PROBE: NOT DETECTED
RSV RNA NPH QL NAA+NON-PROBE: NOT DETECTED
SARS-COV-2 RNA NPH QL NAA+NON-PROBE: NOT DETECTED
SODIUM SERPL-SCNC: 133 MMOL/L (ref 136–145)
T&S EXPIRATION DATE: NORMAL
TROPONIN T SERPL HS-MCNC: 29 NG/L
VARIANT LYMPHS NFR BLD MANUAL: 13 % (ref 19.6–45.3)
VARIANT LYMPHS NFR BLD MANUAL: 23 % (ref 19.6–45.3)
WBC MORPH BLD: NORMAL
WBC MORPH BLD: NORMAL
WBC NRBC COR # BLD AUTO: 31.94 10*3/MM3 (ref 3.4–10.8)
WBC NRBC COR # BLD AUTO: 35.96 10*3/MM3 (ref 3.4–10.8)
WHOLE BLOOD HOLD COAG: NORMAL
WHOLE BLOOD HOLD SPECIMEN: NORMAL

## 2024-03-19 PROCEDURE — 85379 FIBRIN DEGRADATION QUANT: CPT | Performed by: PHYSICIAN ASSISTANT

## 2024-03-19 PROCEDURE — 86850 RBC ANTIBODY SCREEN: CPT | Performed by: INTERNAL MEDICINE

## 2024-03-19 PROCEDURE — 80061 LIPID PANEL: CPT | Performed by: NURSE PRACTITIONER

## 2024-03-19 PROCEDURE — 85025 COMPLETE CBC W/AUTO DIFF WBC: CPT | Performed by: INTERNAL MEDICINE

## 2024-03-19 PROCEDURE — 86922 COMPATIBILITY TEST ANTIGLOB: CPT

## 2024-03-19 PROCEDURE — 36415 COLL VENOUS BLD VENIPUNCTURE: CPT

## 2024-03-19 PROCEDURE — 85014 HEMATOCRIT: CPT | Performed by: PHYSICIAN ASSISTANT

## 2024-03-19 PROCEDURE — 86900 BLOOD TYPING SEROLOGIC ABO: CPT

## 2024-03-19 PROCEDURE — 85007 BL SMEAR W/DIFF WBC COUNT: CPT | Performed by: INTERNAL MEDICINE

## 2024-03-19 PROCEDURE — 25010000002 FUROSEMIDE PER 20 MG: Performed by: PHYSICIAN ASSISTANT

## 2024-03-19 PROCEDURE — 0202U NFCT DS 22 TRGT SARS-COV-2: CPT | Performed by: PHYSICIAN ASSISTANT

## 2024-03-19 PROCEDURE — 80053 COMPREHEN METABOLIC PANEL: CPT | Performed by: EMERGENCY MEDICINE

## 2024-03-19 PROCEDURE — 25010000002 VANCOMYCIN HCL 1.25 G RECONSTITUTED SOLUTION 1 EACH VIAL: Performed by: PHYSICIAN ASSISTANT

## 2024-03-19 PROCEDURE — P9040 RBC LEUKOREDUCED IRRADIATED: HCPCS

## 2024-03-19 PROCEDURE — 25510000001 IOPAMIDOL PER 1 ML: Performed by: EMERGENCY MEDICINE

## 2024-03-19 PROCEDURE — 84484 ASSAY OF TROPONIN QUANT: CPT | Performed by: PHYSICIAN ASSISTANT

## 2024-03-19 PROCEDURE — 71045 X-RAY EXAM CHEST 1 VIEW: CPT

## 2024-03-19 PROCEDURE — 82948 REAGENT STRIP/BLOOD GLUCOSE: CPT

## 2024-03-19 PROCEDURE — 25010000002 AZITHROMYCIN PER 500 MG: Performed by: PHYSICIAN ASSISTANT

## 2024-03-19 PROCEDURE — 83880 ASSAY OF NATRIURETIC PEPTIDE: CPT | Performed by: PHYSICIAN ASSISTANT

## 2024-03-19 PROCEDURE — 85018 HEMOGLOBIN: CPT | Performed by: PHYSICIAN ASSISTANT

## 2024-03-19 PROCEDURE — 36430 TRANSFUSION BLD/BLD COMPNT: CPT

## 2024-03-19 PROCEDURE — 85007 BL SMEAR W/DIFF WBC COUNT: CPT | Performed by: EMERGENCY MEDICINE

## 2024-03-19 PROCEDURE — 93005 ELECTROCARDIOGRAM TRACING: CPT | Performed by: PHYSICIAN ASSISTANT

## 2024-03-19 PROCEDURE — 36592 COLLECT BLOOD FROM PICC: CPT

## 2024-03-19 PROCEDURE — 85025 COMPLETE CBC W/AUTO DIFF WBC: CPT | Performed by: EMERGENCY MEDICINE

## 2024-03-19 PROCEDURE — 86901 BLOOD TYPING SEROLOGIC RH(D): CPT | Performed by: INTERNAL MEDICINE

## 2024-03-19 PROCEDURE — 87040 BLOOD CULTURE FOR BACTERIA: CPT | Performed by: PHYSICIAN ASSISTANT

## 2024-03-19 PROCEDURE — 25810000003 SODIUM CHLORIDE 0.9 % SOLUTION 250 ML FLEX CONT: Performed by: PHYSICIAN ASSISTANT

## 2024-03-19 PROCEDURE — 25010000002 CEFEPIME PER 500 MG: Performed by: PHYSICIAN ASSISTANT

## 2024-03-19 PROCEDURE — 71275 CT ANGIOGRAPHY CHEST: CPT

## 2024-03-19 PROCEDURE — 86900 BLOOD TYPING SEROLOGIC ABO: CPT | Performed by: INTERNAL MEDICINE

## 2024-03-19 PROCEDURE — 63710000001 INSULIN REGULAR HUMAN PER 5 UNITS: Performed by: PHYSICIAN ASSISTANT

## 2024-03-19 PROCEDURE — 99285 EMERGENCY DEPT VISIT HI MDM: CPT

## 2024-03-19 PROCEDURE — 83605 ASSAY OF LACTIC ACID: CPT | Performed by: PHYSICIAN ASSISTANT

## 2024-03-19 RX ORDER — SODIUM CHLORIDE 0.9 % (FLUSH) 0.9 %
10 SYRINGE (ML) INJECTION EVERY 12 HOURS SCHEDULED
Status: DISCONTINUED | OUTPATIENT
Start: 2024-03-19 | End: 2024-03-25 | Stop reason: HOSPADM

## 2024-03-19 RX ORDER — FUROSEMIDE 10 MG/ML
40 INJECTION INTRAMUSCULAR; INTRAVENOUS EVERY 12 HOURS
Status: DISCONTINUED | OUTPATIENT
Start: 2024-03-20 | End: 2024-03-21

## 2024-03-19 RX ORDER — NICOTINE POLACRILEX 4 MG
15 LOZENGE BUCCAL
Status: DISCONTINUED | OUTPATIENT
Start: 2024-03-19 | End: 2024-03-25 | Stop reason: HOSPADM

## 2024-03-19 RX ORDER — IBUPROFEN 600 MG/1
1 TABLET ORAL
Status: DISCONTINUED | OUTPATIENT
Start: 2024-03-19 | End: 2024-03-25 | Stop reason: HOSPADM

## 2024-03-19 RX ORDER — BISACODYL 5 MG/1
5 TABLET, DELAYED RELEASE ORAL DAILY PRN
Status: DISCONTINUED | OUTPATIENT
Start: 2024-03-19 | End: 2024-03-25 | Stop reason: HOSPADM

## 2024-03-19 RX ORDER — INSULIN LISPRO 100 [IU]/ML
2-7 INJECTION, SOLUTION INTRAVENOUS; SUBCUTANEOUS
Status: DISCONTINUED | OUTPATIENT
Start: 2024-03-20 | End: 2024-03-22

## 2024-03-19 RX ORDER — AMOXICILLIN 250 MG
2 CAPSULE ORAL 2 TIMES DAILY PRN
Status: DISCONTINUED | OUTPATIENT
Start: 2024-03-19 | End: 2024-03-25 | Stop reason: HOSPADM

## 2024-03-19 RX ORDER — IPRATROPIUM BROMIDE AND ALBUTEROL SULFATE 2.5; .5 MG/3ML; MG/3ML
3 SOLUTION RESPIRATORY (INHALATION) EVERY 6 HOURS PRN
Status: DISCONTINUED | OUTPATIENT
Start: 2024-03-19 | End: 2024-03-25 | Stop reason: HOSPADM

## 2024-03-19 RX ORDER — GABAPENTIN 400 MG/1
800 CAPSULE ORAL EVERY 12 HOURS SCHEDULED
Status: DISCONTINUED | OUTPATIENT
Start: 2024-03-19 | End: 2024-03-20

## 2024-03-19 RX ORDER — BISACODYL 10 MG
10 SUPPOSITORY, RECTAL RECTAL DAILY PRN
Status: DISCONTINUED | OUTPATIENT
Start: 2024-03-19 | End: 2024-03-25 | Stop reason: HOSPADM

## 2024-03-19 RX ORDER — POLYETHYLENE GLYCOL 3350 17 G/17G
17 POWDER, FOR SOLUTION ORAL DAILY PRN
Status: DISCONTINUED | OUTPATIENT
Start: 2024-03-19 | End: 2024-03-25 | Stop reason: HOSPADM

## 2024-03-19 RX ORDER — FUROSEMIDE 10 MG/ML
40 INJECTION INTRAMUSCULAR; INTRAVENOUS ONCE
Status: COMPLETED | OUTPATIENT
Start: 2024-03-19 | End: 2024-03-19

## 2024-03-19 RX ORDER — SODIUM CHLORIDE 9 MG/ML
250 INJECTION, SOLUTION INTRAVENOUS AS NEEDED
Status: DISCONTINUED | OUTPATIENT
Start: 2024-03-19 | End: 2024-03-21 | Stop reason: HOSPADM

## 2024-03-19 RX ORDER — SODIUM CHLORIDE 9 MG/ML
40 INJECTION, SOLUTION INTRAVENOUS AS NEEDED
Status: DISCONTINUED | OUTPATIENT
Start: 2024-03-19 | End: 2024-03-25 | Stop reason: HOSPADM

## 2024-03-19 RX ORDER — DEXTROSE MONOHYDRATE 25 G/50ML
25 INJECTION, SOLUTION INTRAVENOUS
Status: DISCONTINUED | OUTPATIENT
Start: 2024-03-19 | End: 2024-03-25 | Stop reason: HOSPADM

## 2024-03-19 RX ORDER — ACETAMINOPHEN 325 MG/1
650 TABLET ORAL EVERY 4 HOURS PRN
Status: DISCONTINUED | OUTPATIENT
Start: 2024-03-19 | End: 2024-03-25 | Stop reason: HOSPADM

## 2024-03-19 RX ORDER — ONDANSETRON 2 MG/ML
4 INJECTION INTRAMUSCULAR; INTRAVENOUS EVERY 6 HOURS PRN
Status: DISCONTINUED | OUTPATIENT
Start: 2024-03-19 | End: 2024-03-25 | Stop reason: HOSPADM

## 2024-03-19 RX ORDER — ENOXAPARIN SODIUM 100 MG/ML
40 INJECTION SUBCUTANEOUS DAILY
Status: DISCONTINUED | OUTPATIENT
Start: 2024-03-19 | End: 2024-03-25 | Stop reason: HOSPADM

## 2024-03-19 RX ORDER — SODIUM CHLORIDE 0.9 % (FLUSH) 0.9 %
10 SYRINGE (ML) INJECTION AS NEEDED
Status: DISCONTINUED | OUTPATIENT
Start: 2024-03-19 | End: 2024-03-25 | Stop reason: HOSPADM

## 2024-03-19 RX ORDER — ALPRAZOLAM 0.5 MG/1
0.5 TABLET ORAL NIGHTLY PRN
Status: DISCONTINUED | OUTPATIENT
Start: 2024-03-19 | End: 2024-03-25 | Stop reason: HOSPADM

## 2024-03-19 RX ADMIN — CEFEPIME 2000 MG: 2 INJECTION, POWDER, FOR SOLUTION INTRAVENOUS at 18:51

## 2024-03-19 RX ADMIN — FUROSEMIDE 40 MG: 10 INJECTION, SOLUTION INTRAMUSCULAR; INTRAVENOUS at 18:51

## 2024-03-19 RX ADMIN — INSULIN HUMAN 6 UNITS: 100 INJECTION, SOLUTION PARENTERAL at 18:50

## 2024-03-19 RX ADMIN — AZITHROMYCIN MONOHYDRATE 500 MG: 500 INJECTION, POWDER, LYOPHILIZED, FOR SOLUTION INTRAVENOUS at 19:20

## 2024-03-19 RX ADMIN — IOPAMIDOL 100 ML: 755 INJECTION, SOLUTION INTRAVENOUS at 17:39

## 2024-03-19 RX ADMIN — VANCOMYCIN HYDROCHLORIDE 1250 MG: 1.25 INJECTION, POWDER, LYOPHILIZED, FOR SOLUTION INTRAVENOUS at 18:51

## 2024-03-19 NOTE — ED NOTES
Shortly after completion of blood patient began to states that she was hot and verbalized not feeling well. Patient was sat up, cold rag applied to neck. Primary nurse noted a change in patients vitals, she is tachycardic and tachypenic.  nurse remained at bedside until vitals were stable. ER provider made aware of vital changes and patient complaints

## 2024-03-19 NOTE — ED NOTES
Primary at bedside during transfer of pt from ambulatory to ER. Pt has had 20 minutes of blood infuse prior to arrival to the room. Blood hand off is not applicable in charting due to two separate encounter. Charge Nurse  and ER provider notified. Some triage orders will not apply to patient due to her receiving blood prior to her encounter in ER.

## 2024-03-19 NOTE — ED NOTES
Blood complete. Patient denies having any signs or symptoms of reaction.     Unable to complete unit due to it being started in ambulatory and not transferred.     Completion vitals:    Temp. 98.4   /101    RR19  O2 94% 2L NC

## 2024-03-19 NOTE — NURSING NOTE
"WBC 31.94.  O2 85% on room air. Patient placed on 2L oxygen nasal cannula. Patient states, \"she does not wear oxygen at home. Secure chat sent to Dr. Lerma regarding patients WBC and Vital signs.   "

## 2024-03-19 NOTE — ED NOTES
Primary nurse called blood bank about patient occurrence. Spoke to supervisor, Tracey. She advised that we do a transfusion reaction work up since patient has 3 noted antibodies in her blood. ER provider notified.

## 2024-03-19 NOTE — NURSING NOTE
Patient brought to the ED due to hypoxia per Dr. Lerma. Blood running at 150 ml/hr. Report passed to Susan JENSEN

## 2024-03-19 NOTE — ED PROVIDER NOTES
Subjective   History of Present Illness  Chief Complaint: Shortness of breath    Patient is a 48-year-old female with history of CML undergoing oral chemotherapy, diabetes presents to the ER from ambulatory care with complaints of shortness of breath.  Patient states she has been feeling short of breath for couple days and seems to be getting worse.  She had blood work done today with a hemoglobin of 6.5 and is currently receiving only unit of blood per her oncologist, Dr. Lerma.  Patient's O2 saturation 85% on room air prior to receiving blood.  She reports some chest tightness and mild cough.  No hemoptysis.  No abdominal pain but does report her abdomen feels bloated and states that she feels like she is retaining water.  She has some lower extremity swelling as well.  No headache lightheadedness or dizziness.  Patient does have history of PEs but does not take any blood thinners due to history of thrombocytopenia per patient.  No recent fever.    PCP: None  Onc: Florentin    History provided by:  Patient      Review of Systems   Constitutional:  Positive for fatigue. Negative for fever.   HENT:  Negative for congestion, sore throat and trouble swallowing.    Eyes: Negative.    Respiratory:  Positive for cough, chest tightness and shortness of breath. Negative for wheezing.    Cardiovascular:  Positive for leg swelling. Negative for chest pain.   Gastrointestinal:  Negative for abdominal pain, diarrhea, nausea and vomiting.   Endocrine: Negative.    Genitourinary:  Negative for dysuria.   Musculoskeletal:  Negative for myalgias.   Skin:  Negative for rash.   Allergic/Immunologic: Negative.    Neurological:  Negative for headaches.   Psychiatric/Behavioral:  Negative for behavioral problems.    All other systems reviewed and are negative.      Past Medical History:   Diagnosis Date    Adverse effect of chemotherapy 11/08/2023    Bone pain     Chronic constipation 07/07/2023    COVID-19 virus infection 01/12/2022     Diabetes mellitus     Diabetic gastroparesis 2023    Extremity pain     Carlin. legs pain    Fall during current hospitalization 2023    Leg pain     left leg greater    Migraine     Petechial rash 2023    Pubic ramus fracture, left, closed, initial encounter 2023    Pulmonary embolism     Vision loss     doing surgery       No Known Allergies    Past Surgical History:   Procedure Laterality Date    BONE MARROW BIOPSY      BREAST SURGERY      BRONCHOSCOPY N/A 2022    Procedure: BRONCHOSCOPY bilateral lung washing;  Surgeon: Charlene Camara MD;  Location: [facility] ENDOSCOPY;  Service: Pulmonary;  Laterality: N/A;  post: rule out infection vs transfusion lung injury     SECTION      CHOLECYSTECTOMY      COLONOSCOPY N/A 2023    Procedure: COLONOSCOPY;  Surgeon: Freddy Villeda MD;  Location: [facility] ENDOSCOPY;  Service: Gastroenterology;  Laterality: N/A;  poor prep    ENDOSCOPY N/A 2023    Procedure: ESOPHAGOGASTRODUODENOSCOPY with biopsy x2 areas;  Surgeon: Freddy Villeda MD;  Location: [facility] ENDOSCOPY;  Service: Gastroenterology;  Laterality: N/A;  food in stomach;abnormal duodenal mucosa    EYE SURGERY      laser surgery due  to hemmorage--- 2021-- another surgery  lt eye11/15/21    RETINAL DETACHMENT SURGERY      SPINE SURGERY      Lombardi spinal block    TUBAL ABDOMINAL LIGATION         Family History   Problem Relation Age of Onset    Diabetes Mother     Diabetes Maternal Grandmother     Heart attack Maternal Grandmother     Stroke Maternal Grandmother        Social History     Socioeconomic History    Marital status: Legally    Tobacco Use    Smoking status: Never    Smokeless tobacco: Never   Vaping Use    Vaping status: Never Used   Substance and Sexual Activity    Alcohol use: No    Drug use: No    Sexual activity: Defer           Objective   Physical Exam  Vitals and nursing note reviewed.   Constitutional:       Appearance: Normal appearance. She is  well-developed. She is not ill-appearing or toxic-appearing.   HENT:      Head: Normocephalic and atraumatic.      Mouth/Throat:      Mouth: Mucous membranes are moist.   Eyes:      Pupils: Pupils are equal, round, and reactive to light.      Comments: Mild periorbital edema   Cardiovascular:      Rate and Rhythm: Regular rhythm. Tachycardia present.      Pulses: Normal pulses.      Heart sounds: Normal heart sounds. No murmur heard.  Pulmonary:      Effort: Pulmonary effort is normal. No respiratory distress.      Breath sounds: No wheezing.      Comments: Decreased breath sounds right middle lower lobe  Abdominal:      General: Bowel sounds are normal. There is no distension.      Palpations: Abdomen is soft.      Tenderness: There is no abdominal tenderness. There is no right CVA tenderness or left CVA tenderness.      Comments: Bloated abdomen no rigidity or rebounding   Musculoskeletal:         General: Tenderness present.      Right lower leg: Edema present.      Left lower leg: Edema present.   Skin:     General: Skin is warm and dry.      Capillary Refill: Capillary refill takes less than 2 seconds.      Coloration: Skin is pale.      Findings: No rash.   Neurological:      General: No focal deficit present.      Mental Status: She is alert and oriented to person, place, and time.      Motor: No weakness.   Psychiatric:         Mood and Affect: Mood normal.         Behavior: Behavior normal.         ECG 12 Lead      Date/Time: 3/19/2024 3:45 PM    Performed by: Micaela Winkler PA  Authorized by: Chandler Ibarra MD  Interpreted by ED physician  Previous ECG: no previous ECG available  Rhythm: sinus tachycardia  Rate: tachycardic  BPM: 117  QRS axis: normal  Conduction: conduction normal  Clinical impression: non-specific ECG               ED Course  ED Course as of 03/20/24 0124   Tue Mar 19, 2024   1456 Patient is currently receiving blood from ambulatory care. Unable to collect blood cultures at this time  "[]   1905 Discussed CT scan results, antibiotic choice with the ER attending, Dr. Trejo.  Will add azithromycin for atypical coverage.  Agreed with current plan of care. []   1922 Waiting for hospitalist consult []   1933 I spoke with Dr. Cruz regarding admission []      ED Course User Index  [] Peterson Otilia, PA    /97 (BP Location: Right arm, Patient Position: Lying)   Pulse 103   Temp 98.4 °F (36.9 °C)   Resp 18   Ht 160 cm (62.99\")   Wt 62.6 kg (137 lb 15.8 oz)   LMP 05/25/2022 (Approximate)   SpO2 96%   BMI 24.45 kg/m²   Labs Reviewed   COMPREHENSIVE METABOLIC PANEL - Abnormal; Notable for the following components:       Result Value    Glucose 451 (*)     Sodium 133 (*)     CO2 21.0 (*)     Calcium 7.6 (*)     Alkaline Phosphatase 607 (*)     Total Bilirubin 1.9 (*)     All other components within normal limits    Narrative:     GFR Normal >60  Chronic Kidney Disease <60  Kidney Failure <15     CBC WITH AUTO DIFFERENTIAL - Abnormal; Notable for the following components:    WBC 35.96 (*)     RBC 2.99 (*)     Hemoglobin 7.9 (*)     Hematocrit 27.6 (*)     MCH 26.4 (*)     MCHC 28.6 (*)     RDW 17.6 (*)     RDW-SD 58.6 (*)     All other components within normal limits   SINGLE HSTROPONIN T - Abnormal; Notable for the following components:    HS Troponin T 29 (*)     All other components within normal limits    Narrative:     High Sensitive Troponin T Reference Range:  <14.0 ng/L- Negative Female for AMI  <22.0 ng/L- Negative Male for AMI  >=14 - Abnormal Female indicating possible myocardial injury.  >=22 - Abnormal Male indicating possible myocardial injury.   Clinicians would have to utilize clinical acumen, EKG, Troponin, and serial changes to determine if it is an Acute Myocardial Infarction or myocardial injury due to an underlying chronic condition.        BNP (IN-HOUSE) - Abnormal; Notable for the following components:    proBNP 12,315.0 (*)     All other components within " "normal limits    Narrative:     This assay is used as an aid in the diagnosis of individuals suspected of having heart failure. It can be used as an aid in the diagnosis of acute decompensated heart failure (ADHF) in patients presenting with signs and symptoms of ADHF to the emergency department (ED). In addition, NT-proBNP of <300 pg/mL indicates ADHF is not likely.    Age Range Result Interpretation  NT-proBNP Concentration (pg/mL:      <50             Positive            >450                   Gray                 300-450                    Negative             <300    50-75           Positive            >900                  Gray                300-900                  Negative            <300      >75             Positive            >1800                  Gray                300-1800                  Negative            <300   D-DIMER, QUANTITATIVE - Abnormal; Notable for the following components:    D-Dimer, Quantitative 1.23 (*)     All other components within normal limits    Narrative:     According to the assay 's published package insert, a normal (<0.50 mg/L (FEU)) D-dimer result in conjunction with a non-high clinical probability assessment, excludes deep vein thrombosis (DVT) and pulmonary embolism (PE) with high sensitivity.    D-dimer values increase with age and this can make VTE exclusion of an older population difficult. To address this, the [employer], based on best available evidence and recent guidelines, recommends that clinicians use age-adjusted D-dimer thresholds in patients greater than 50 years of age with: a) a low probability of PE who do not meet all Pulmonary Embolism Rule Out Criteria, or b) in those with intermediate probability of PE.   The formula for an age-adjusted D-dimer cut-off is \"age/100\".  For example, a 60 year old patient would have an age-adjusted cut-off of 0.60 mg/L (FEU) and an 80 year old 0.80 mg/L (FEU).   MANUAL DIFFERENTIAL - " Abnormal; Notable for the following components:    Lymphocyte % 13.0 (*)     Basophil % 7.0 (*)     Bands %  8.0 (*)     Metamyelocyte % 5.0 (*)     Myelocyte % 8.0 (*)     Promyelocyte % 3.0 (*)     Blasts % 1.0 (*)     Prolymphocyte % 2.0 (*)     Neutrophils Absolute 18.34 (*)     Lymphocytes Absolute 4.67 (*)     Monocytes Absolute 3.24 (*)     Basophils Absolute 2.52 (*)     nRBC 7.0 (*)     All other components within normal limits    Narrative:     Reviewed by Pathologist within the past 30 days on 3/13/24.     HEMOGLOBIN AND HEMATOCRIT, BLOOD - Abnormal; Notable for the following components:    Hemoglobin 7.8 (*)     Hematocrit 25.6 (*)     All other components within normal limits   POC LACTATE - Abnormal; Notable for the following components:    Lactate <0.3 (*)     All other components within normal limits   POCT GLUCOSE FINGERSTICK - Abnormal; Notable for the following components:    Glucose 348 (*)     All other components within normal limits   POCT GLUCOSE FINGERSTICK - Abnormal; Notable for the following components:    Glucose 351 (*)     All other components within normal limits   RESPIRATORY PANEL PCR W/ COVID-19 (SARS-COV-2), NP SWAB IN UTM/VTP, 2 HR TAT - Normal    Narrative:     In the setting of a positive respiratory panel with a viral infection PLUS a negative procalcitonin without other underlying concern for bacterial infection, consider observing off antibiotics or discontinuation of antibiotics and continue supportive care. If the respiratory panel is positive for atypical bacterial infection (Bordetella pertussis, Chlamydophila pneumoniae, or Mycoplasma pneumoniae), consider antibiotic de-escalation to target atypical bacterial infection.   BLOOD CULTURE   BLOOD CULTURE   RAINBOW DRAW    Narrative:     The following orders were created for panel order [place] Draw.  Procedure                               Abnormality         Status                     ---------                                -----------         ------                     Green Top (Gel)[[id number]]                                  Final result               Lavender Top[[id number]]                                     Final result               Gold Top - SST[[id number]]                                   Final result               Light Blue Top[[id number]]                                   Final result                 Please view results for these tests on the individual orders.   CBC (NO DIFF)   POCT GLUCOSE FINGERSTICK   POCT GLUCOSE FINGERSTICK   POCT GLUCOSE FINGERSTICK   POCT GLUCOSE FINGERSTICK   TRANSFUSION REACTION EVALUATION    Narrative:     The following orders were created for panel order Transfusion Reaction Evaluation.  Procedure                               Abnormality         Status                     ---------                               -----------         ------                     Transfusion Reaction Blessing...[[id number]]                      In process                 Pathology Consultation[[id number]]                                                        Please view results for these tests on the individual orders.   TRANSFUSION REACTION EVALUATION   PATHOLOGY CONSULTATION   CBC AND DIFFERENTIAL    Narrative:     The following orders were created for panel order CBC & Differential.  Procedure                               Abnormality         Status                     ---------                               -----------         ------                     CBC Auto Differential[[id number]]        Abnormal            Final result                 Please view results for these tests on the individual orders.   GREEN TOP   LAVENDER TOP   GOLD TOP - SST   LIGHT BLUE TOP     Medications   sodium chloride 0.9 % flush 10 mL (has no administration in time range)   azithromycin (ZITHROMAX) 500 mg in sodium chloride 0.9 % 250 mL IVPB-VTB (0 mg Intravenous Stopped 3/19/24 4240)   sodium chloride 0.9 % flush 10 mL (10 mL Intravenous  Given 3/20/24 0035)   sodium chloride 0.9 % flush 10 mL (has no administration in time range)   sodium chloride 0.9 % infusion 40 mL (has no administration in time range)   Enoxaparin Sodium (LOVENOX) syringe 40 mg (40 mg Subcutaneous Given 3/20/24 0036)   Potassium Replacement - Follow Nurse / BPA Driven Protocol (has no administration in time range)   Magnesium Standard Dose Replacement - Follow Nurse / BPA Driven Protocol (has no administration in time range)   Phosphorus Replacement - Follow Nurse / BPA Driven Protocol (has no administration in time range)   Calcium Replacement - Follow Nurse / BPA Driven Protocol (has no administration in time range)   acetaminophen (TYLENOL) tablet 650 mg (has no administration in time range)   sennosides-docusate (PERICOLACE) 8.6-50 MG per tablet 2 tablet (has no administration in time range)     And   polyethylene glycol (MIRALAX) packet 17 g (has no administration in time range)     And   bisacodyl (DULCOLAX) EC tablet 5 mg (has no administration in time range)     And   bisacodyl (DULCOLAX) suppository 10 mg (has no administration in time range)   ondansetron (ZOFRAN) injection 4 mg (has no administration in time range)   dextrose (GLUTOSE) oral gel 15 g (has no administration in time range)   dextrose (D50W) (25 g/50 mL) IV injection 25 g (has no administration in time range)   glucagon (GLUCAGEN) injection 1 mg (has no administration in time range)   insulin lispro (HUMALOG/ADMELOG) injection 2-7 Units (6 Units Subcutaneous Given 3/20/24 0035)   ipratropium-albuterol (DUO-NEB) nebulizer solution 3 mL (has no administration in time range)   insulin glargine (LANTUS, SEMGLEE) injection 25 Units (25 Units Subcutaneous Given 3/20/24 0035)   ALPRAZolam (XANAX) tablet 0.5 mg (has no administration in time range)   furosemide (LASIX) injection 40 mg (has no administration in time range)   gabapentin (NEURONTIN) capsule 800 mg (has no administration in time range)   insulin  regular (humuLIN R,novoLIN R) injection 6 Units (6 Units Subcutaneous Given 3/19/24 1850)   iopamidol (ISOVUE-370) 76 % injection 100 mL (100 mL Intravenous Given 3/19/24 1739)   cefepime 2000 mg IVPB in 100 mL NS (MBP) (0 mg Intravenous Stopped 3/19/24 1933)   Vancomycin HCl 1,250 mg in sodium chloride 0.9 % 250 mL VTB (1,250 mg Intravenous New Bag 3/19/24 1851)   furosemide (LASIX) injection 40 mg (40 mg Intravenous Given 3/19/24 1851)     CT Angiogram Chest Pulmonary Embolism    Result Date: 3/19/2024  Impression: 1. No acute pulmonary embolic disease. 2. Volume loss particular right side. The right hemidiaphragm is markedly elevated. This could be secondary to diaphragmatic paralysis. 3. Extensive right upper lobe and to a lesser degree right lower lobe pneumonia. There is also abnormal groundglass density and interstitial thickening. I would favor multifocal pneumonia possibly from an atypical infection. 4. Trace right pleural effusion. 5. Hepatosplenomegaly. Electronically Signed: Austen Sage MD  3/19/2024 6:02 PM EDT  Workstation ID: [id number]    XR Chest 1 View    Result Date: 3/19/2024  Impression: 1. Low lung volumes. 2. Cardiomegaly. 3. Bilateral airspace disease which could be due to multifocal pneumonia or pulmonary edema. Electronically Signed: Austen Sage MD  3/19/2024 3:49 PM EDT  Workstation ID: [id number]                                            Medical Decision Making  Differential Dx (Includes but not limited to): Pneumonia pleural effusion PE CHF exacerbation  Medical Records Reviewed: Patient is seen by Dr. Lerma 2/27/2024 for follow-up regarding CML.  She reports feeling poorly at that time stating she was not able to keep any food down and felt nauseous.  2D echo 3/10/2023 showed ejection fraction 44%  Labs: On my interpretation lactate less than 0.3.  CBC leukocytosis 35,000, previously 31,000 earlier today.  CMP glucose 451.  Sodium 133.  Hemoglobin 7.9.  Troponin 29.  BNP 12,000.  Dimer  1.23.  Respiratory panel negative.  Imaging: On my interpretation chest x-ray poor inspiratory volume.  CT PE shows no evidence of PE extensive right upper lobe pneumonia likely multifocal pneumonia.  Telemetry: On my interpretation reviewed myself interpreted by ER attending sinus tachycardia rate of 117 with no acute ST elevation or depression  Testing considered but not ordered: CT head patient denies headache or head injury  Nature of Complaint: Acute  Admission vs Discharge: Admission  Discussion: While in the ED IV was placed and labs were obtained appropriate PPE was worn during exam and throughout all encounters with the patient.  Patient had the above evaluation.  Patient afebrile, she is ill-appearing, pale currently receiving blood transfusion per ambulatory care.  Sepsis protocol initiated lactate 0.3 blood cultures pending.  Patient does have leukocytosis 35,000, on chart review this appears to have been increasing over the last few weeks.  She has abdominal distention and lower extremity swelling which she reports is new she does not take Lasix.  She was given IV Lasix.  Respiratory panel negative.  Dimer elevated, CT PE protocol negative for PE but does show right multifocal pneumonia.  Given immunocompromise status patient will be treated for atypical pneumonia with cefepime vancomycin and azithromycin.  This was discussed with ER attending Dr. Trejo who agreed.  Patient will be admitted for further monitoring and treatment for right-sided multifocal pneumonia hypoxia.  I spoke with Dr. Cruz regarding admission.    Based on the clinical findings at this time I anticipate that the patient will require 2 midnight stay.    Case discussed with ER attending Dr. Trejo who agreed with plan of care.    Problems Addressed:  Anemia, unspecified type: acute illness or injury  Bilateral lower extremity edema: acute illness or injury  Hypoxia: acute illness or injury  Pneumonia of right lung due to  infectious organism, unspecified part of lung: acute illness or injury    Amount and/or Complexity of Data Reviewed  External Data Reviewed: notes.  Labs: ordered. Decision-making details documented in ED Course.  Radiology: ordered. Decision-making details documented in ED Course.  ECG/medicine tests: ordered. Decision-making details documented in ED Course.    Risk  OTC drugs.  Prescription drug management.  Decision regarding hospitalization.        Final diagnoses:   Pneumonia of right lung due to infectious organism, unspecified part of lung   Hypoxia   Anemia, unspecified type   Bilateral lower extremity edema       ED Disposition  ED Disposition       ED Disposition   Decision to Admit    Condition   --    Comment   Level of Care: Telemetry [5]   Diagnosis: Multifocal pneumonia [[id number]]   Admitting Physician: JAY LIN [[id number]]   Attending Physician: JAY LIN [[id number]]   Certification: I Certify That Inpatient Hospital Services Are Medically Necessary For Greater Than 2 Midnights                 No follow-up provider specified.       Medication List      No changes were made to your prescriptions during this visit.            Micaela Winkler PA  03/20/24 0124

## 2024-03-19 NOTE — Clinical Note
Level of Care: Progressive Care [20]   Admitting Physician: JAY LIN [[id number]]   Attending Physician: JAY LIN [[id number]]

## 2024-03-20 ENCOUNTER — DOCUMENTATION (OUTPATIENT)
Dept: HOME HEALTH SERVICES | Facility: HOME HEALTHCARE | Age: 49
End: 2024-03-20
Payer: COMMERCIAL

## 2024-03-20 PROBLEM — N39.0 UTI (URINARY TRACT INFECTION): Status: RESOLVED | Noted: 2023-07-06 | Resolved: 2024-03-20

## 2024-03-20 PROBLEM — J96.01 ACUTE RESPIRATORY FAILURE WITH HYPOXIA: Status: RESOLVED | Noted: 2022-07-28 | Resolved: 2024-03-20

## 2024-03-20 PROBLEM — I42.8 NICM (NONISCHEMIC CARDIOMYOPATHY): Chronic | Status: ACTIVE | Noted: 2022-06-29

## 2024-03-20 PROBLEM — E88.3 TUMOR LYSIS SYNDROME: Status: RESOLVED | Noted: 2022-07-05 | Resolved: 2024-03-20

## 2024-03-20 PROBLEM — D64.81 ANEMIA DUE TO CHEMOTHERAPY: Status: ACTIVE | Noted: 2024-03-19

## 2024-03-20 PROBLEM — J96.01 ACUTE RESPIRATORY FAILURE WITH HYPOXIA: Status: ACTIVE | Noted: 2024-03-19

## 2024-03-20 PROBLEM — S06.5X0A TRAUMATIC SUBDURAL HEMORRHAGE WITHOUT LOSS OF CONSCIOUSNESS: Status: RESOLVED | Noted: 2023-05-17 | Resolved: 2024-03-20

## 2024-03-20 PROBLEM — I50.31 ACUTE DIASTOLIC CHF (CONGESTIVE HEART FAILURE): Status: RESOLVED | Noted: 2022-06-29 | Resolved: 2024-03-20

## 2024-03-20 PROBLEM — I50.23 ACUTE ON CHRONIC HFREF (HEART FAILURE WITH REDUCED EJECTION FRACTION): Status: ACTIVE | Noted: 2024-03-20

## 2024-03-20 PROBLEM — R07.9 CHEST PAIN, UNSPECIFIED TYPE: Status: RESOLVED | Noted: 2022-11-03 | Resolved: 2024-03-20

## 2024-03-20 PROBLEM — R60.0 BILATERAL LOWER EXTREMITY EDEMA: Status: ACTIVE | Noted: 2024-03-20

## 2024-03-20 PROBLEM — T45.1X5A ANEMIA DUE TO CHEMOTHERAPY: Status: ACTIVE | Noted: 2024-03-19

## 2024-03-20 PROBLEM — S06.5XAA BILATERAL SUBDURAL HEMATOMAS: Status: RESOLVED | Noted: 2023-05-18 | Resolved: 2024-03-20

## 2024-03-20 PROBLEM — R00.0 TACHYCARDIA: Status: ACTIVE | Noted: 2024-03-20

## 2024-03-20 LAB
ANION GAP SERPL CALCULATED.3IONS-SCNC: 10 MMOL/L (ref 5–15)
BH BB BLOOD EXPIRATION DATE: NORMAL
BH BB BLOOD TYPE BARCODE: 600
BH BB DISPENSE STATUS: NORMAL
BH BB PRODUCT CODE: NORMAL
BH BB UNIT NUMBER: NORMAL
BUN SERPL-MCNC: 18 MG/DL (ref 6–20)
BUN/CREAT SERPL: 26.9 (ref 7–25)
CA-I SERPL ISE-MCNC: 1.1 MMOL/L (ref 1.2–1.3)
CALCIUM SPEC-SCNC: 7.8 MG/DL (ref 8.6–10.5)
CHLORIDE SERPL-SCNC: 101 MMOL/L (ref 98–107)
CHOLEST SERPL-MCNC: 181 MG/DL (ref 0–200)
CO2 SERPL-SCNC: 23 MMOL/L (ref 22–29)
CREAT SERPL-MCNC: 0.67 MG/DL (ref 0.57–1)
CROSSMATCH INTERPRETATION: NORMAL
DEPRECATED RDW RBC AUTO: 55.7 FL (ref 37–54)
EGFRCR SERPLBLD CKD-EPI 2021: 108 ML/MIN/1.73
ERYTHROCYTE [DISTWIDTH] IN BLOOD BY AUTOMATED COUNT: 17.2 % (ref 12.3–15.4)
GEN 5 2HR TROPONIN T REFLEX: 35 NG/L
GLUCOSE BLDC GLUCOMTR-MCNC: 134 MG/DL (ref 70–105)
GLUCOSE BLDC GLUCOMTR-MCNC: 204 MG/DL (ref 70–105)
GLUCOSE BLDC GLUCOMTR-MCNC: 227 MG/DL (ref 70–105)
GLUCOSE BLDC GLUCOMTR-MCNC: 322 MG/DL (ref 70–105)
GLUCOSE BLDC GLUCOMTR-MCNC: 351 MG/DL (ref 70–105)
GLUCOSE SERPL-MCNC: 217 MG/DL (ref 65–99)
HBA1C MFR BLD: 9.7 % (ref 4.8–5.6)
HCT VFR BLD AUTO: 23.9 % (ref 34–46.6)
HDLC SERPL-MCNC: 55 MG/DL (ref 40–60)
HGB BLD-MCNC: 7.3 G/DL (ref 12–15.9)
LDLC SERPL CALC-MCNC: 96 MG/DL (ref 0–100)
LDLC/HDLC SERPL: 1.64 {RATIO}
MCH RBC QN AUTO: 27.1 PG (ref 26.6–33)
MCHC RBC AUTO-ENTMCNC: 30.5 G/DL (ref 31.5–35.7)
MCV RBC AUTO: 88.8 FL (ref 79–97)
MRSA DNA SPEC QL NAA+PROBE: NORMAL
PLATELET # BLD AUTO: 182 10*3/MM3 (ref 140–450)
PMV BLD AUTO: 10.6 FL (ref 6–12)
POTASSIUM SERPL-SCNC: 4.3 MMOL/L (ref 3.5–5.2)
QT INTERVAL: 351 MS
QTC INTERVAL: 491 MS
RBC # BLD AUTO: 2.69 10*6/MM3 (ref 3.77–5.28)
SODIUM SERPL-SCNC: 134 MMOL/L (ref 136–145)
TRIGL SERPL-MCNC: 178 MG/DL (ref 0–150)
TROPONIN T DELTA: 1 NG/L
TROPONIN T SERPL HS-MCNC: 34 NG/L
UNIT  ABO: NORMAL
UNIT  RH: NORMAL
VANCOMYCIN SERPL-MCNC: 23.1 MCG/ML (ref 5–40)
VLDLC SERPL-MCNC: 30 MG/DL (ref 5–40)
WBC NRBC COR # BLD AUTO: 29.28 10*3/MM3 (ref 3.4–10.8)

## 2024-03-20 PROCEDURE — 25810000003 SODIUM CHLORIDE 0.9 % SOLUTION 250 ML FLEX CONT: Performed by: PHYSICIAN ASSISTANT

## 2024-03-20 PROCEDURE — 99223 1ST HOSP IP/OBS HIGH 75: CPT | Performed by: INTERNAL MEDICINE

## 2024-03-20 PROCEDURE — 25010000002 FUROSEMIDE PER 20 MG: Performed by: HOSPITALIST

## 2024-03-20 PROCEDURE — 83036 HEMOGLOBIN GLYCOSYLATED A1C: CPT | Performed by: NURSE PRACTITIONER

## 2024-03-20 PROCEDURE — 80048 BASIC METABOLIC PNL TOTAL CA: CPT | Performed by: NURSE PRACTITIONER

## 2024-03-20 PROCEDURE — 63710000001 INSULIN LISPRO (HUMAN) PER 5 UNITS: Performed by: HOSPITALIST

## 2024-03-20 PROCEDURE — 25010000002 AZITHROMYCIN PER 500 MG: Performed by: PHYSICIAN ASSISTANT

## 2024-03-20 PROCEDURE — 80202 ASSAY OF VANCOMYCIN: CPT | Performed by: HOSPITALIST

## 2024-03-20 PROCEDURE — 99222 1ST HOSP IP/OBS MODERATE 55: CPT | Performed by: INTERNAL MEDICINE

## 2024-03-20 PROCEDURE — 85027 COMPLETE CBC AUTOMATED: CPT | Performed by: HOSPITALIST

## 2024-03-20 PROCEDURE — 82948 REAGENT STRIP/BLOOD GLUCOSE: CPT | Performed by: HOSPITALIST

## 2024-03-20 PROCEDURE — 63710000001 INSULIN GLARGINE PER 5 UNITS: Performed by: HOSPITALIST

## 2024-03-20 PROCEDURE — 25810000003 SODIUM CHLORIDE 0.9 % SOLUTION 250 ML FLEX CONT: Performed by: HOSPITALIST

## 2024-03-20 PROCEDURE — 25010000002 ENOXAPARIN PER 10 MG: Performed by: HOSPITALIST

## 2024-03-20 PROCEDURE — 87641 MR-STAPH DNA AMP PROBE: CPT | Performed by: STUDENT IN AN ORGANIZED HEALTH CARE EDUCATION/TRAINING PROGRAM

## 2024-03-20 PROCEDURE — 25010000002 VANCOMYCIN 750 MG RECONSTITUTED SOLUTION 1 EACH VIAL: Performed by: HOSPITALIST

## 2024-03-20 PROCEDURE — 84484 ASSAY OF TROPONIN QUANT: CPT | Performed by: NURSE PRACTITIONER

## 2024-03-20 PROCEDURE — 25010000002 VANCOMYCIN 1 G RECONSTITUTED SOLUTION 1 EACH VIAL: Performed by: STUDENT IN AN ORGANIZED HEALTH CARE EDUCATION/TRAINING PROGRAM

## 2024-03-20 PROCEDURE — 82330 ASSAY OF CALCIUM: CPT | Performed by: NURSE PRACTITIONER

## 2024-03-20 PROCEDURE — 25010000002 CEFEPIME PER 500 MG: Performed by: HOSPITALIST

## 2024-03-20 PROCEDURE — 25810000003 SODIUM CHLORIDE 0.9 % SOLUTION 250 ML FLEX CONT: Performed by: STUDENT IN AN ORGANIZED HEALTH CARE EDUCATION/TRAINING PROGRAM

## 2024-03-20 PROCEDURE — 82948 REAGENT STRIP/BLOOD GLUCOSE: CPT

## 2024-03-20 RX ORDER — GABAPENTIN 400 MG/1
800 CAPSULE ORAL EVERY 8 HOURS SCHEDULED
Status: DISCONTINUED | OUTPATIENT
Start: 2024-03-20 | End: 2024-03-25 | Stop reason: HOSPADM

## 2024-03-20 RX ORDER — BLOOD-GLUCOSE METER
1 EACH MISCELLANEOUS
Qty: 1 KIT | Refills: 0 | OUTPATIENT
Start: 2024-03-20

## 2024-03-20 RX ORDER — LANCETS
1 EACH MISCELLANEOUS
Qty: 100 EACH | Refills: 2 | OUTPATIENT
Start: 2024-03-20

## 2024-03-20 RX ORDER — NITROGLYCERIN 0.4 MG/1
0.4 TABLET SUBLINGUAL
Status: DISCONTINUED | OUTPATIENT
Start: 2024-03-20 | End: 2024-03-25 | Stop reason: HOSPADM

## 2024-03-20 RX ORDER — LOSARTAN POTASSIUM 25 MG/1
25 TABLET ORAL
Status: DISCONTINUED | OUTPATIENT
Start: 2024-03-20 | End: 2024-03-25 | Stop reason: HOSPADM

## 2024-03-20 RX ORDER — BLOOD SUGAR DIAGNOSTIC
1 STRIP MISCELLANEOUS
Qty: 100 EACH | Refills: 2 | OUTPATIENT
Start: 2024-03-20

## 2024-03-20 RX ORDER — METOPROLOL SUCCINATE 25 MG/1
25 TABLET, EXTENDED RELEASE ORAL
Status: DISCONTINUED | OUTPATIENT
Start: 2024-03-20 | End: 2024-03-21

## 2024-03-20 RX ADMIN — SODIUM CHLORIDE 1000 MG: 9 INJECTION, SOLUTION INTRAVENOUS at 19:22

## 2024-03-20 RX ADMIN — INSULIN LISPRO 3 UNITS: 100 INJECTION, SOLUTION INTRAVENOUS; SUBCUTANEOUS at 17:17

## 2024-03-20 RX ADMIN — INSULIN GLARGINE 25 UNITS: 100 INJECTION, SOLUTION SUBCUTANEOUS at 21:49

## 2024-03-20 RX ADMIN — Medication 10 ML: at 00:35

## 2024-03-20 RX ADMIN — Medication 10 ML: at 08:40

## 2024-03-20 RX ADMIN — VANCOMYCIN HYDROCHLORIDE 750 MG: 750 INJECTION, POWDER, LYOPHILIZED, FOR SOLUTION INTRAVENOUS at 06:12

## 2024-03-20 RX ADMIN — INSULIN LISPRO 3 UNITS: 100 INJECTION, SOLUTION INTRAVENOUS; SUBCUTANEOUS at 12:09

## 2024-03-20 RX ADMIN — INSULIN LISPRO 6 UNITS: 100 INJECTION, SOLUTION INTRAVENOUS; SUBCUTANEOUS at 00:35

## 2024-03-20 RX ADMIN — ENOXAPARIN SODIUM 40 MG: 100 INJECTION SUBCUTANEOUS at 00:36

## 2024-03-20 RX ADMIN — FUROSEMIDE 40 MG: 10 INJECTION, SOLUTION INTRAMUSCULAR; INTRAVENOUS at 05:34

## 2024-03-20 RX ADMIN — INSULIN LISPRO 5 UNITS: 100 INJECTION, SOLUTION INTRAVENOUS; SUBCUTANEOUS at 21:49

## 2024-03-20 RX ADMIN — ALPRAZOLAM 0.5 MG: 0.5 TABLET ORAL at 20:38

## 2024-03-20 RX ADMIN — AZITHROMYCIN MONOHYDRATE 500 MG: 500 INJECTION, POWDER, LYOPHILIZED, FOR SOLUTION INTRAVENOUS at 20:39

## 2024-03-20 RX ADMIN — FUROSEMIDE 40 MG: 10 INJECTION, SOLUTION INTRAMUSCULAR; INTRAVENOUS at 17:17

## 2024-03-20 RX ADMIN — GABAPENTIN 800 MG: 400 CAPSULE ORAL at 14:21

## 2024-03-20 RX ADMIN — CEFEPIME 2000 MG: 2 INJECTION, POWDER, FOR SOLUTION INTRAVENOUS at 05:34

## 2024-03-20 RX ADMIN — EMPAGLIFLOZIN 10 MG: 10 TABLET, FILM COATED ORAL at 08:57

## 2024-03-20 RX ADMIN — CEFEPIME 2000 MG: 2 INJECTION, POWDER, FOR SOLUTION INTRAVENOUS at 21:50

## 2024-03-20 RX ADMIN — ENOXAPARIN SODIUM 40 MG: 100 INJECTION SUBCUTANEOUS at 17:17

## 2024-03-20 RX ADMIN — LOSARTAN POTASSIUM 25 MG: 25 TABLET, FILM COATED ORAL at 12:09

## 2024-03-20 RX ADMIN — INSULIN GLARGINE 25 UNITS: 100 INJECTION, SOLUTION SUBCUTANEOUS at 00:35

## 2024-03-20 RX ADMIN — GABAPENTIN 800 MG: 400 CAPSULE ORAL at 20:38

## 2024-03-20 RX ADMIN — Medication 10 ML: at 20:39

## 2024-03-20 RX ADMIN — CEFEPIME 2000 MG: 2 INJECTION, POWDER, FOR SOLUTION INTRAVENOUS at 14:21

## 2024-03-20 NOTE — PROGRESS NOTES
"Pharmacy Antimicrobial Dosing Service    Subjective:  Nieves Mc is a 48 y.o.female admitted with SOA. Pharmacy has been consulted to dose Vancomycin and Cefepime for possible pneumonia.    PMH: DM, CML on chemo      Assessment/Plan    1. Day #2 Vancomycin: Goal -600 mcg*h/mL. 1250mg (~20mg/kg ABW) IV x1 dose followed by 750mg (~12mg/kg ABW) IV q12h.  Random level ordered for 3/20 at 1300.    2. Day #2 Cefepime: 2000mg IV q8h for estCrCl > 60 mL/min.    3. Day #2 Azithromycin 500mg IV q24h    Will continue to monitor drug levels, renal function, culture and sensitivities, and patient clinical status.       Objective:  Relevant clinical data and objective history reviewed:  160 cm (62.99\")   62.6 kg (137 lb 15.8 oz)   Ideal body weight: 52.4 kg (115 lb 7.7 oz)  Adjusted ideal body weight: 56.5 kg (124 lb 7.7 oz)  Body mass index is 24.45 kg/m².        Results from last 7 days   Lab Units 03/19/24  1521   CREATININE mg/dL 0.84     Estimated Creatinine Clearance: 80.9 mL/min (by C-G formula based on SCr of 0.84 mg/dL).  No intake/output data recorded.    Results from last 7 days   Lab Units 03/20/24  0332 03/19/24  1521 03/19/24  1121   WBC 10*3/mm3 29.28* 35.96* 31.94*     Temperature    03/19/24 1427 03/19/24 1601 03/20/24 0330   Temp: 98.2 °F (36.8 °C) 98.4 °F (36.9 °C) 98.2 °F (36.8 °C)     Baseline culture/source/susceptibility:  Microbiology Results (last 10 days)       Procedure Component Value - Date/Time    Respiratory Panel PCR w/COVID-19(SARS-CoV-2) [facility]/EUGENIA/MARVIN/PAD/COR/CARRIE In-House, NP Swab in UTM/VTM, 2 HR TAT - Swab, Nasopharynx [[id number]]  (Normal) Collected: 03/19/24 1521    Lab Status: Final result Specimen: Swab from Nasopharynx Updated: 03/19/24 1628     ADENOVIRUS, PCR Not Detected     Coronavirus 229E Not Detected     Coronavirus HKU1 Not Detected     Coronavirus NL63 Not Detected     Coronavirus OC43 Not Detected     COVID19 Not Detected     Human Metapneumovirus Not Detected     Human " Rhinovirus/Enterovirus Not Detected     Influenza A PCR Not Detected     Influenza B PCR Not Detected     Parainfluenza Virus 1 Not Detected     Parainfluenza Virus 2 Not Detected     Parainfluenza Virus 3 Not Detected     Parainfluenza Virus 4 Not Detected     RSV, PCR Not Detected     Bordetella pertussis pcr Not Detected     Bordetella parapertussis PCR Not Detected     Chlamydophila pneumoniae PCR Not Detected     Mycoplasma pneumo by PCR Not Detected    Narrative:      In the setting of a positive respiratory panel with a viral infection PLUS a negative procalcitonin without other underlying concern for bacterial infection, consider observing off antibiotics or discontinuation of antibiotics and continue supportive care. If the respiratory panel is positive for atypical bacterial infection (Bordetella pertussis, Chlamydophila pneumoniae, or Mycoplasma pneumoniae), consider antibiotic de-escalation to target atypical bacterial infection.            Yoav Anton  03/20/24 05:20 EDT

## 2024-03-20 NOTE — ED NOTES
"Patient sent to ER from ambulatory by Dr. Lerma. Patient states that she get blood transfusion weekly due to her condition of \"CMJ\" and has low hgb levels in the past. Today patients hgb is 6.0 per ambulatory. Patient states that she has been and Dr. Lerma wanted her to have further evaluation in Er to check for pneumonia. Patient normally does not wear oxygen but was placed on 2L NC after having oxygen sats of mid to high 80's in ambulatory.  "

## 2024-03-20 NOTE — CONSULTS
"Diabetes Education  Assessment/Teaching    Patient Name:  Nieves Mc  YOB: 1975  MRN: [id number]  Admit Date:  3/19/2024      Assessment Date:  3/20/2024  Flowsheet Row Most Recent Value   General Information     Referral From: A1c, Blood glucose, MD order  [MD consult for blood glucose >200, admission blood sugar 451, and on 3/20/2024 A1c was 9.7%.]   Height 160 cm (62.99\")   Height Method Stated   Weight 62.6 kg (137 lb 15.8 oz)   Weight Method Bed scale   Pregnancy Assessment    Diabetes History    What type of diabetes do you have? Type 2   Length of Diabetes Diagnosis 6 - 10 years  [Diagnosed 2018]   Current DM knowledge fair   Have you had diabetes education/teaching in the past? yes   When and where was your diabetes education? Inpatient hospitalizations at [facility] with last one being 11/6/2023   Do you test your blood sugar at home? no   Have you had high blood sugar? (>140mg/dl) yes   How often do you have high blood sugar? unknown   When was your last high blood sugar? Admission blood sugar 451   Education Preferences    What areas of diabetes would you like to learn about? avoiding high blood sugar, diabetes complications, testing my blood sugar at home   Nutrition Information    Assessment Topics    Problem Solving - Assessment Needs education   Reducing Risk - Assessment Needs education   Monitoring - Assessment Needs education   DM Goals    Problem Solving - Goal Today   Reducing Risk - Goal Today   Monitoring - Goal Today            Flowsheet Row Most Recent Value   DM Education Needs    Meter Needs meter   Meter Type Accuchek   Frequency of Testing AC meals   Medication Insulin, Pen  [At home patient stated she takes Lantus 25 units at bedtime.]   Problem Solving Hyperglycemia, Signs, Symptoms, Treatment   Reducing Risks A1C testing  [On 3/20/2024 A1c was 9.7%.]   Discharge Plan Home   Motivation Moderate   Teaching Method Discussion, Handouts   Patient Response Verbalized " understanding  [ at bedside and involved with discussion.]              Other Comments:  A1c info sheet given with discussion on A1c target and healthy blood sugar range. Patient ran out of sensors to check her blood sugar in January and hasn't checked her blood sugar since then and has not been taking her Humalog due to being unable to check blood sugar. Case management working with to get a PCP. Discussed importance of checking blood sugar and  agreeable to check blood sugars with meter if patient unable to get sensors. Prescriptions started in discharge orders for Freestyle Zahira (4 refills for a 2 month supply), Accu-chek Guide Me glucometer, test strips, and lancets. Patient and  have no further questions or concerns related to diabetes at this time.        Electronically signed by:  Micaela Melvin RN  03/20/24 18:32 EDT

## 2024-03-20 NOTE — PROGRESS NOTES
[facility] MEDICINE SERVICE  DAILY PROGRESS NOTE    NAME: Nieves Mc  : 1975  MRN: [id number]      LOS: 1 day     PROVIDER OF SERVICE: Gilda Morgan MD    Chief Complaint: Multifocal pneumonia    Subjective:     Interval History:  History taken from: patient  Patient Complaints: Continues to feel bad with bodyaches and shortness of breath.  Patient Denies: Chest pain, lightness, dizziness, fever rigors and chills.    Review of Systems:   All systems reviewed and negative except as mentioned above.    Objective:     Vital Signs  Temp:  [97.9 °F (36.6 °C)-98.4 °F (36.9 °C)] 98.4 °F (36.9 °C)  Heart Rate:  [] 118  Resp:  [13-20] 13  BP: (133-164)/() 150/87  Flow (L/min):  [1-2] 2   Body mass index is 24.45 kg/m².    Physical Exam  Physical Exam  Constitutional:       Appearance: Normal appearance.   HENT:      Head: Normocephalic and atraumatic.      Mouth/Throat:      Mouth: Mucous membranes are moist.   Eyes:      Pupils: Pupils are equal, round, and reactive to light.   Cardiovascular:      Rate and Rhythm: Normal rate and regular rhythm.   Pulmonary:      Effort: Pulmonary effort is normal.      Breath sounds: Rales present.   Abdominal:      General: Bowel sounds are normal.      Palpations: Abdomen is soft.      Tenderness: There is no abdominal tenderness.   Musculoskeletal:         General: Normal range of motion.      Cervical back: Normal range of motion.   Skin:     General: Skin is warm and dry.   Neurological:      General: No focal deficit present.      Mental Status: She is alert and oriented to person, place, and time.   Psychiatric:         Behavior: Behavior normal.         Scheduled Meds   azithromycin, 500 mg, Intravenous, Q24H  cefepime, 2,000 mg, Intravenous, Q8H  empagliflozin, 10 mg, Oral, Daily  enoxaparin, 40 mg, Subcutaneous, Daily  furosemide, 40 mg, Intravenous, Q12H  gabapentin, 800 mg, Oral, Q8H  insulin glargine, 25 Units, Subcutaneous, Nightly  insulin  lispro, 2-7 Units, Subcutaneous, 4x Daily AC & at Bedtime  losartan, 25 mg, Oral, Q24H  metoprolol succinate XL, 25 mg, Oral, Q24H  sodium chloride, 10 mL, Intravenous, Q12H  vancomycin, 1,000 mg, Intravenous, Q12H       PRN Meds     acetaminophen    ALPRAZolam    senna-docusate sodium **AND** polyethylene glycol **AND** bisacodyl **AND** bisacodyl    Calcium Replacement - Follow Nurse / BPA Driven Protocol    dextrose    dextrose    glucagon (human recombinant)    ipratropium-albuterol    Magnesium Standard Dose Replacement - Follow Nurse / BPA Driven Protocol    nitroglycerin    ondansetron    Pharmacy to Dose Cefepime    Pharmacy to dose vancomycin    Phosphorus Replacement - Follow Nurse / BPA Driven Protocol    Potassium Replacement - Follow Nurse / BPA Driven Protocol    sodium chloride    sodium chloride    sodium chloride   Infusions  pain,   Pharmacy to Dose Cefepime,   Pharmacy to dose vancomycin,           Diagnostic Data    Results from last 7 days   Lab Units 03/20/24  0851 03/20/24  0332 03/19/24  1841 03/19/24  1521   WBC 10*3/mm3  --  29.28*  --  35.96*   HEMOGLOBIN g/dL  --  7.3*   < > 7.9*   HEMATOCRIT %  --  23.9*   < > 27.6*   PLATELETS 10*3/mm3  --  182  --  209   GLUCOSE mg/dL 217*  --   --  451*   CREATININE mg/dL 0.67  --   --  0.84   BUN mg/dL 18  --   --  17   SODIUM mmol/L 134*  --   --  133*   POTASSIUM mmol/L 4.3  --   --  5.1   AST (SGOT) U/L  --   --   --  20   ALT (SGPT) U/L  --   --   --  17   ALK PHOS U/L  --   --   --  607*   BILIRUBIN mg/dL  --   --   --  1.9*   ANION GAP mmol/L 10.0  --   --  12.0    < > = values in this interval not displayed.       CT Angiogram Chest Pulmonary Embolism    Result Date: 3/19/2024  Impression: 1. No acute pulmonary embolic disease. 2. Volume loss particular right side. The right hemidiaphragm is markedly elevated. This could be secondary to diaphragmatic paralysis. 3. Extensive right upper lobe and to a lesser degree right lower lobe pneumonia.  There is also abnormal groundglass density and interstitial thickening. I would favor multifocal pneumonia possibly from an atypical infection. 4. Trace right pleural effusion. 5. Hepatosplenomegaly. Electronically Signed: Austen Sage MD  3/19/2024 6:02 PM EDT  Workstation ID: [id number]    XR Chest 1 View    Result Date: 3/19/2024  Impression: 1. Low lung volumes. 2. Cardiomegaly. 3. Bilateral airspace disease which could be due to multifocal pneumonia or pulmonary edema. Electronically Signed: Austen Sage MD  3/19/2024 3:49 PM EDT  Workstation ID: [id number]       I reviewed the patient's new clinical results.    Assessment/Plan:     Active and Resolved Problems  Active Hospital Problems    Diagnosis  POA    **Multifocal pneumonia [J18.9]  Yes    Bilateral lower extremity edema [R60.0]  Yes    Acute on chronic HFrEF (heart failure with reduced ejection fraction) [I50.23]  Yes    Acute respiratory failure with hypoxia [J96.01]  Yes    Anemia due to chemotherapy [D64.81, T45.1X5A]  Yes    NICM (nonischemic cardiomyopathy) [I42.8]  Yes    Type 2 diabetes mellitus with diabetic polyneuropathy, with long-term current use of insulin [E11.42, Z79.4]  Not Applicable    Medically noncompliant [Z91.199]  Not Applicable    CML (chronic myelocytic leukemia) [C92.10]  Yes      Resolved Hospital Problems   No resolved problems to display.     Multifocal pneumonia  Continue vancomycin, cefepime and azithromycin  Pharmacy to dose vancomycin and cefepime  Follow-up blood cultures     Hypoxia  Dyspnea  Continue oxygen supplementation  Wean as tolerated  Add breathing treatments     CML  WBC 35.9>29.8  On chemo  Consult hematology/oncology  Repeat CBC     Anemia  H&H 6.5/22  Status post 1 unit RBC  Consult hematology/oncology  Repeat H&H stable. Daily monitoring.      Combined heart failure  Elevated BNP  Echo from 3/10/2023 shows EF of 44% with moderately elevated right ventricular systolic pressure  Start Lasix 40 mg IV twice  daily  Consult cardiology     Diabetes type 2  Hyperglycemia  Resume home insulin regimen  Add sliding scale insulin  hemoglobin A1c 9.70      DVT prophylaxis:  Medical and mechanical DVT prophylaxis orders are present.         Code status is   Code Status and Medical Interventions:   Ordered at: 03/19/24 2044     Code Status (Patient has no pulse and is not breathing):    CPR (Attempt to Resuscitate)     Medical Interventions (Patient has pulse or is breathing):    Full Support       Plan for disposition: Likely to DC home in couple of days.    Time: 30 minutes    Signature: Electronically signed by Gilda Morgan MD, 03/20/24, 14:43 EDT.  [facility] Hospitalist Team
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
## 2024-03-20 NOTE — CONSULTS
Referring Provider: Gilda Morgan MD    Reason for Consultation:  CHF      Patient Care Team:  Provider, No Known as PCP - Meghann Fox MD as Consulting Physician (Hematology and Oncology)  Hamida Feldman MD as Consulting Physician (Cardiology)  Jane Hopper MD as Consulting Physician (Interventional Cardiology)      SUBJECTIVE     Chief Complaint:  shortness of breath     History of present illness:  Nieves Mc is a 48 y.o. female with a history of HFrEF secondary to nonischemic cardiomyopathy, Type 2 diabetes, CML on chemotherapy, and medical noncompliance who presented to [facility] on 3/19/2024 complaining of shortness of breath. The patient had been in ambulatory care receiving a blood transfusion for hemoglobin of 6.5 when she was noted to be hypoxic. She was placed on supplemental oxygen and sent to the ER for further evaluation. Workup in the ER revealed pneumonia and bilateral lower extremity edema. The patient was started on empiric antibiotics and given Lasix 40 mg IV. She was admitted for further treatment.     After admission cardiology was consulted for further management of CHF. Of note, the patient's EF was 26-30% in November 2022, but improved to 44% in March 2023. She has not seen Cardiology since that time. She has also been noncompliant with her medications. She denies any chest pain. She reports increasing shortness of breath and leg swelling. She does not wear home oxygen.       Review of systems:    Constitutional: No weakness, fatigue, fever, rigors, chills   Eyes: No vision changes, eye pain   ENT/oropharynx: No difficulty swallowing, sore throat, epistaxis, changes in hearing   Cardiovascular: + leg swelling.  No chest pain, chest tightness, palpitations, paroxysmal nocturnal dyspnea, orthopnea, diaphoresis, dizziness / syncopal episode   Respiratory: + shortness of breath, cough.  No wheezing, hemoptysis   Gastrointestinal: No abdominal pain,  nausea, vomiting, diarrhea, bloody stools   Genitourinary: No hematuria, dysuria   Neurological: No headache, tremors, numbness, one-sided weakness    Musculoskeletal: No cramps, myalgias, joint pain, joint swelling   Integument: No rash        Personal History:      Past Medical History:   Diagnosis Date    Acute respiratory failure with hypoxia 2022    Adverse effect of chemotherapy 2023    Bilateral subdural hematomas 2023    Bone pain     Chronic constipation 2023    CML (chronic myelocytic leukemia) 2018    COVID-19 virus infection 2022    Diabetes mellitus     Diabetic gastroparesis 2023    Extremity pain     Carlin. legs pain    Fall during current hospitalization 2023    Leg pain     left leg greater    Medically noncompliant 2021    Migraine     Petechial rash 2023    Pubic ramus fracture, left, closed, initial encounter 2023    Pulmonary embolism     Traumatic subdural hemorrhage without loss of consciousness 2023    Tumor lysis syndrome 2022    UTI (urinary tract infection) 2023    Vision loss     doing surgery       Past Surgical History:   Procedure Laterality Date    BONE MARROW BIOPSY      BREAST SURGERY      BRONCHOSCOPY N/A 2022    Procedure: BRONCHOSCOPY bilateral lung washing;  Surgeon: Charlene Camara MD;  Location: [facility] ENDOSCOPY;  Service: Pulmonary;  Laterality: N/A;  post: rule out infection vs transfusion lung injury     SECTION      CHOLECYSTECTOMY      COLONOSCOPY N/A 2023    Procedure: COLONOSCOPY;  Surgeon: Freddy Villeda MD;  Location: [facility] ENDOSCOPY;  Service: Gastroenterology;  Laterality: N/A;  poor prep    ENDOSCOPY N/A 2023    Procedure: ESOPHAGOGASTRODUODENOSCOPY with biopsy x2 areas;  Surgeon: Freddy Villeda MD;  Location: [facility] ENDOSCOPY;  Service: Gastroenterology;  Laterality: N/A;  food in stomach;abnormal duodenal mucosa    EYE SURGERY      laser surgery due  to  hemmorage--- 5/13/2021-- another surgery  lt eye11/15/21    RETINAL DETACHMENT SURGERY      SPINE SURGERY      Lombardi spinal block    TUBAL ABDOMINAL LIGATION         Family History   Problem Relation Age of Onset    Diabetes Mother     Diabetes Maternal Grandmother     Heart attack Maternal Grandmother     Stroke Maternal Grandmother        Social History     Tobacco Use    Smoking status: Never    Smokeless tobacco: Never   Vaping Use    Vaping status: Never Used   Substance Use Topics    Alcohol use: No    Drug use: No        Home meds:  Prior to Admission medications    Medication Sig Start Date End Date Taking? Authorizing Provider   acetaminophen (TYLENOL) 325 MG tablet Take 2 tablets by mouth Every 4 (Four) Hours As Needed for Moderate Pain. Indications: Fever, Pain 1/2/24  Yes Meghann Lerma MD   ALPRAZolam (Xanax) 0.5 MG tablet Take 1 tablet by mouth At Night As Needed for Anxiety. Indications: Feeling Anxious 3/8/24  Yes Denisha Gill APRN   cetirizine (zyrTEC) 10 MG tablet Take 1 tablet by mouth Daily. 11/9/23  Yes Yuniel Yancey MD   dapagliflozin Propanediol (Farxiga) 10 MG tablet Take 10 mg (1 tablet) by mouth Daily. Dx code: E11.65 11/8/23  Yes Yuniel Yancey MD   furosemide (LASIX) 40 MG tablet Take 1 tablet by mouth Daily. 12/19/22  Yes ProviderMelecio MD   gabapentin (Neurontin) 800 MG tablet Take 1 tablet by mouth 3 (Three) Times a Day. Ordered through [facility]  Indications: Diabetes with Nerve Disease 1/28/24  Yes ProviderMelecio MD   Insulin Glargine (LANTUS SOLOSTAR) 100 UNIT/ML injection pen Inject 25 Units under the skin into the appropriate area as directed Every Night. Dx code: E11.65 11/8/23  Yes Yuniel Yancey MD   Insulin Lispro, 1 Unit Dial, (HumaLOG KwikPen) 100 UNIT/ML solution pen-injector Inject 7 Units under the skin into the appropriate area as directed 3 (Three) Times a Day Before Meals. Dx code: E11.65  Patient taking differently:  Inject 7 Units under the skin into the appropriate area as directed 3 (Three) Times a Day Before Meals. 11/8/23  Yes Yuniel Yancey MD   metoclopramide (Reglan) 10 MG tablet Take 1 tablet by mouth 4 (Four) Times a Day. 7/9/23  Yes Melecio Almanzar MD   mirtazapine (REMERON) 15 MG tablet TAKE 1 TABLET BY MOUTH EVERY NIGHT AT BEDTIME. INDICATIONS: MAJOR DEPRESSIVE DISORDER  Patient taking differently: Take 1 tablet by mouth every night at bedtime. 2/21/24  Yes Meghann Lerma MD   pain patient supplied pump by Intrathecal route.   Yes Melecio Almanzar MD   PONATinib HCl (Iclusig) 10 MG tablet Take 10 mg by mouth Every Other Day. Take with or without food.  Swallow whole, do not crush or chew. 3/6/24  Yes Meghann Lerma MD   tiZANidine (ZANAFLEX) 4 MG tablet Take 1 tablet by mouth Daily. Indications: Musculoskeletal Pain 5/10/23  Yes Melecio Almanzar MD   Continuous Blood Gluc Sensor (FreeStyle Zahira 2 Sensor) misc Use 1 each 4 (Four) Times a Day Before Meals & at Bedtime. Dx code: E11.65  Patient not taking: Reported on 2/27/2024 11/8/23   Yuniel Yancey MD   Insulin Pen Needle (Pen Needles) 32G X 4 MM misc Use 1 each 4 (Four) Times a Day Before Meals & at Bedtime. Dx code: E11.65 11/8/23   Yuniel Yancey MD       Allergies:     Patient has no known allergies.    Scheduled Meds:azithromycin, 500 mg, Intravenous, Q24H  cefepime, 2,000 mg, Intravenous, Q8H  empagliflozin, 10 mg, Oral, Daily  enoxaparin, 40 mg, Subcutaneous, Daily  furosemide, 40 mg, Intravenous, Q12H  gabapentin, 800 mg, Oral, Q8H  insulin glargine, 25 Units, Subcutaneous, Nightly  insulin lispro, 2-7 Units, Subcutaneous, 4x Daily AC & at Bedtime  metoprolol succinate XL, 25 mg, Oral, Q24H  sodium chloride, 10 mL, Intravenous, Q12H  vancomycin, 750 mg, Intravenous, Q12H      Continuous Infusions:Pharmacy to Dose Cefepime,   Pharmacy to dose vancomycin,       PRN Meds:  acetaminophen    ALPRAZolam     "senna-docusate sodium **AND** polyethylene glycol **AND** bisacodyl **AND** bisacodyl    Calcium Replacement - Follow Nurse / BPA Driven Protocol    dextrose    dextrose    glucagon (human recombinant)    ipratropium-albuterol    Magnesium Standard Dose Replacement - Follow Nurse / BPA Driven Protocol    ondansetron    Pharmacy to Dose Cefepime    Pharmacy to dose vancomycin    Phosphorus Replacement - Follow Nurse / BPA Driven Protocol    Potassium Replacement - Follow Nurse / BPA Driven Protocol    sodium chloride    sodium chloride    sodium chloride      OBJECTIVE    Vital Signs  Vitals:    03/20/24 0035 03/20/24 0330 03/20/24 0653 03/20/24 0743   BP: 136/97 137/86 (!) 164/104 140/85   BP Location: Right arm Right arm Right arm    Patient Position: Lying Lying Lying    Pulse: 103 96 118    Resp: 18 18 20    Temp:  98.2 °F (36.8 °C) 97.9 °F (36.6 °C)    TempSrc:  Oral Oral    SpO2: 96% 97% 95%    Weight: 62.6 kg (137 lb 15.8 oz)      Height: 160 cm (62.99\")          Flowsheet Rows      Flowsheet Row First Filed Value   Admission Height 160 cm (63\") Documented at 03/19/2024 1427   Admission Weight 62.6 kg (138 lb) Documented at 03/19/2024 1427              Intake/Output Summary (Last 24 hours) at 3/20/2024 0847  Last data filed at 3/19/2024 2210  Gross per 24 hour   Intake 350 ml   Output --   Net 350 ml        Telemetry:  sinus tachycardia     Physical Exam:  The patient is alert, oriented and in no distress.  Vital signs as noted above.  Head and neck revealed no carotid bruits or jugular venous distention.   Lungs with bibasilar rales.  No wheezing.  On oxygen at 1 liter per nasal cannula.   Heart:  Tachycardic.  Normal first and second heart sounds. No murmur.  No precordial rub is present.  No gallop is present.  Abdomen: Soft and nontender.    Extremities with good peripheral pulses with edema of BLE.  Skin: Warm and dry.  Musculoskeletal system is grossly normal.  CNS grossly normal.       Results " Review:  I have personally reviewed the results from the time of this admission to 3/20/2024 08:47 EDT and agree with these findings:  [x]  Laboratory  [x]  Microbiology  [x]  Radiology  [x]  EKG/Telemetry   [x]  Cardiology/Vascular   []  Pathology  [x]  Old records  []  Other:    Most notable findings include:     Lab Results (last 24 hours)       Procedure Component Value Units Date/Time    Lipid Panel [[id number]] Collected: 03/19/24 1521    Specimen: Blood Updated: 03/20/24 0845    POC Glucose 4x Daily Before Meals & at Bedtime [[id number]]  (Abnormal) Collected: 03/20/24 0733    Specimen: Blood Updated: 03/20/24 0736     Glucose 134 mg/dL      Comment: Serial Number: [id number]:  [id number]       CBC (No Diff) [[id number]]  (Abnormal) Collected: 03/20/24 0332    Specimen: Blood Updated: 03/20/24 0426     WBC 29.28 10*3/mm3      RBC 2.69 10*6/mm3      Hemoglobin 7.3 g/dL      Hematocrit 23.9 %      MCV 88.8 fL      MCH 27.1 pg      MCHC 30.5 g/dL      RDW 17.2 %      RDW-SD 55.7 fl      MPV 10.6 fL      Platelets 182 10*3/mm3     POC Glucose Once [[id number]]  (Abnormal) Collected: 03/19/24 2358    Specimen: Blood Updated: 03/20/24 0000     Glucose 351 mg/dL      Comment: Serial Number: [id number]:  [id number]       POC Lactate [[id number]]  (Abnormal) Collected: 03/19/24 1851    Specimen: Blood Updated: 03/19/24 1854     Lactate <0.3 mmol/L      Comment: Serial Number: [id number]:  [id number]       Hemoglobin & Hematocrit, Blood [[id number]]  (Abnormal) Collected: 03/19/24 1841    Specimen: Blood Updated: 03/19/24 1846     Hemoglobin 7.8 g/dL      Hematocrit 25.6 %     Blood Culture - Blood, Arm, Right [[id number]] Collected: 03/19/24 1841    Specimen: Blood from Arm, Right Updated: 03/19/24 1844    Blood Culture - Blood, Blood, Central Line [[id number]] Collected: 03/19/24 1841    Specimen: Blood, Central Line Updated: 03/19/24 1843    POC Glucose Once [[id number]]  (Abnormal) Collected: 03/19/24 1820     Specimen: Blood Updated: 03/19/24 1822     Glucose 348 mg/dL      Comment: Serial Number: [id number]:  [id number]       [place] Draw [[id number]] Collected: 03/19/24 1521    Specimen: Blood Updated: 03/19/24 1632    Narrative:      The following orders were created for panel order [place] Draw.  Procedure                               Abnormality         Status                     ---------                               -----------         ------                     Green Top (Gel)[[id number]]                                  Final result               Lavender Top[[id number]]                                     Final result               Gold Top - SST[[id number]]                                   Final result               Light Blue Top[[id number]]                                   Final result                 Please view results for these tests on the individual orders.    Lavender Top [[id number]] Collected: 03/19/24 1521    Specimen: Blood Updated: 03/19/24 1632     Extra Tube hold for add-on     Comment: Auto resulted       Gold Top - SST [[id number]] Collected: 03/19/24 1521    Specimen: Blood Updated: 03/19/24 1632     Extra Tube Hold for add-ons.     Comment: Auto resulted.       Light Blue Top [[id number]] Collected: 03/19/24 1521    Specimen: Blood Updated: 03/19/24 1632     Extra Tube Hold for add-ons.     Comment: Auto resulted       Respiratory Panel PCR w/COVID-19(SARS-CoV-2) ZEYAD/EUGENIA/MARVIN/PAD/COR/CARRIE In-House, NP Swab in UTM/VTM, 2 HR TAT - Swab, Nasopharynx [[id number]]  (Normal) Collected: 03/19/24 1521    Specimen: Swab from Nasopharynx Updated: 03/19/24 1628     ADENOVIRUS, PCR Not Detected     Coronavirus 229E Not Detected     Coronavirus HKU1 Not Detected     Coronavirus NL63 Not Detected     Coronavirus OC43 Not Detected     COVID19 Not Detected     Human Metapneumovirus Not Detected     Human Rhinovirus/Enterovirus Not Detected     Influenza A PCR Not Detected     Influenza B PCR Not Detected      Parainfluenza Virus 1 Not Detected     Parainfluenza Virus 2 Not Detected     Parainfluenza Virus 3 Not Detected     Parainfluenza Virus 4 Not Detected     RSV, PCR Not Detected     Bordetella pertussis pcr Not Detected     Bordetella parapertussis PCR Not Detected     Chlamydophila pneumoniae PCR Not Detected     Mycoplasma pneumo by PCR Not Detected    Narrative:      In the setting of a positive respiratory panel with a viral infection PLUS a negative procalcitonin without other underlying concern for bacterial infection, consider observing off antibiotics or discontinuation of antibiotics and continue supportive care. If the respiratory panel is positive for atypical bacterial infection (Bordetella pertussis, Chlamydophila pneumoniae, or Mycoplasma pneumoniae), consider antibiotic de-escalation to target atypical bacterial infection.    Manual Differential [[id number]]  (Abnormal) Collected: 03/19/24 1521    Specimen: Blood Updated: 03/19/24 1558     Neutrophil % 43.0 %      Lymphocyte % 13.0 %      Monocyte % 9.0 %      Eosinophil % 1.0 %      Basophil % 7.0 %      Bands %  8.0 %      Metamyelocyte % 5.0 %      Myelocyte % 8.0 %      Promyelocyte % 3.0 %      Blasts % 1.0 %      Prolymphocyte % 2.0 %      Neutrophils Absolute 18.34 10*3/mm3      Lymphocytes Absolute 4.67 10*3/mm3      Monocytes Absolute 3.24 10*3/mm3      Eosinophils Absolute 0.36 10*3/mm3      Basophils Absolute 2.52 10*3/mm3      nRBC 7.0 /100 WBC      Anisocytosis Slight/1+     Hypochromia Slight/1+     Polychromasia Slight/1+     WBC Morphology Normal     Large Platelets Slight/1+    Narrative:      Reviewed by Pathologist within the past 30 days on 3/13/24.      CBC & Differential [[id number]]  (Abnormal) Collected: 03/19/24 1521    Specimen: Blood Updated: 03/19/24 1558    Narrative:      The following orders were created for panel order CBC & Differential.  Procedure                               Abnormality         Status                      ---------                               -----------         ------                     CBC Auto Differential[[id number]]        Abnormal            Final result                 Please view results for these tests on the individual orders.    CBC Auto Differential [[id number]]  (Abnormal) Collected: 03/19/24 1521    Specimen: Blood Updated: 03/19/24 1558     WBC 35.96 10*3/mm3      RBC 2.99 10*6/mm3      Hemoglobin 7.9 g/dL      Hematocrit 27.6 %      MCV 92.3 fL      MCH 26.4 pg      MCHC 28.6 g/dL      RDW 17.6 %      RDW-SD 58.6 fl      MPV 10.3 fL      Platelets 209 10*3/mm3     Comprehensive Metabolic Panel [[id number]]  (Abnormal) Collected: 03/19/24 1521    Specimen: Blood Updated: 03/19/24 1549     Glucose 451 mg/dL      BUN 17 mg/dL      Creatinine 0.84 mg/dL      Sodium 133 mmol/L      Potassium 5.1 mmol/L      Chloride 100 mmol/L      CO2 21.0 mmol/L      Calcium 7.6 mg/dL      Total Protein 7.1 g/dL      Albumin 3.6 g/dL      ALT (SGPT) 17 U/L      AST (SGOT) 20 U/L      Alkaline Phosphatase 607 U/L      Total Bilirubin 1.9 mg/dL      Globulin 3.5 gm/dL      A/G Ratio 1.0 g/dL      BUN/Creatinine Ratio 20.2     Anion Gap 12.0 mmol/L      eGFR 85.8 mL/min/1.73     Narrative:      GFR Normal >60  Chronic Kidney Disease <60  Kidney Failure <15      Green Top (Gel) [[id number]] Collected: 03/19/24 1521    Specimen: Blood Updated: 03/19/24 1549     Extra Tube hold    Single High Sensitivity Troponin T [[id number]]  (Abnormal) Collected: 03/19/24 1521    Specimen: Blood Updated: 03/19/24 1548     HS Troponin T 29 ng/L     Narrative:      High Sensitive Troponin T Reference Range:  <14.0 ng/L- Negative Female for AMI  <22.0 ng/L- Negative Male for AMI  >=14 - Abnormal Female indicating possible myocardial injury.  >=22 - Abnormal Male indicating possible myocardial injury.   Clinicians would have to utilize clinical acumen, EKG, Troponin, and serial changes to determine if it is an Acute Myocardial Infarction  or myocardial injury due to an underlying chronic condition.         BNP [[id number]]  (Abnormal) Collected: 03/19/24 1521    Specimen: Blood Updated: 03/19/24 1548     proBNP 12,315.0 pg/mL     Narrative:      This assay is used as an aid in the diagnosis of individuals suspected of having heart failure. It can be used as an aid in the diagnosis of acute decompensated heart failure (ADHF) in patients presenting with signs and symptoms of ADHF to the emergency department (ED). In addition, NT-proBNP of <300 pg/mL indicates ADHF is not likely.    Age Range Result Interpretation  NT-proBNP Concentration (pg/mL:      <50             Positive            >450                   Gray                 300-450                    Negative             <300    50-75           Positive            >900                  Gray                300-900                  Negative            <300      >75             Positive            >1800                  Gray                300-1800                  Negative            <300    D-dimer, Quantitative [[id number]]  (Abnormal) Collected: 03/19/24 1521    Specimen: Blood Updated: 03/19/24 1543     D-Dimer, Quantitative 1.23 mg/L (FEU)     Narrative:      According to the assay 's published package insert, a normal (<0.50 mg/L (FEU)) D-dimer result in conjunction with a non-high clinical probability assessment, excludes deep vein thrombosis (DVT) and pulmonary embolism (PE) with high sensitivity.    D-dimer values increase with age and this can make VTE exclusion of an older population difficult. To address this, the [employer], based on best available evidence and recent guidelines, recommends that clinicians use age-adjusted D-dimer thresholds in patients greater than 50 years of age with: a) a low probability of PE who do not meet all Pulmonary Embolism Rule Out Criteria, or b) in those with intermediate probability of PE.   The formula for an age-adjusted  "D-dimer cut-off is \"age/100\".  For example, a 60 year old patient would have an age-adjusted cut-off of 0.60 mg/L (FEU) and an 80 year old 0.80 mg/L (FEU).            Imaging Results (Last 24 Hours)       Procedure Component Value Units Date/Time    CT Angiogram Chest Pulmonary Embolism [[id number]] Collected: 03/19/24 1741     Updated: 03/19/24 1805    Narrative:      CT ANGIOGRAM CHEST PULMONARY EMBOLISM    Date of Exam: 3/19/2024 5:24 PM EDT    Indication: Elevated D-dimer level, shortness of breath, abnormal chest x-ray, multifocal pneumonia versus pulmonary edema.    Comparison: CT of the chest performed on 11/13/2022 and a chest x-ray performed on 3/19/2024 at 1543 hours.    Technique: Axial CT images were obtained of the chest after the uneventful intravenous administration of iodinated contrast utilizing pulmonary embolism protocol.  Sagittal and coronal reconstructions were performed.  Automated exposure control and   iterative reconstruction methods were used.      Findings:  There are no filling defects within the pulmonary arteries to indicate acute pulmonary embolic disease. There is volume loss, particularly on the right side where the hemidiaphragm is markedly elevated. This could be secondary to diaphragmatic paralysis.   There is dense airspace consolidation with air bronchograms in the right upper lobe consistent with pneumonia. There are similar findings in the right lower lobe to a lesser degree. There is also a background of abnormal groundglass density with some   areas of interstitial thickening. I would favor multifocal pneumonia. There is a trace right pleural effusion. There is no left pleural effusion. The heart is enlarged. There are no enlarged hilar or mediastinal lymph nodes. The patient has a left upper   extremity PICC line in place with the tip terminating at the junction of the left brachiocephalic vein and superior vena cava. Both the liver and spleen appear enlarged. They were not " completely included in the field-of-view. The gallbladder is   surgically absent. There appears to be an intrathecal catheter within the thoracic spine. There are no suspicious osteolytic or sclerotic lesions within the bony thorax. There are mild degenerative changes.      Impression:      Impression:    1. No acute pulmonary embolic disease.  2. Volume loss particular right side. The right hemidiaphragm is markedly elevated. This could be secondary to diaphragmatic paralysis.  3. Extensive right upper lobe and to a lesser degree right lower lobe pneumonia. There is also abnormal groundglass density and interstitial thickening. I would favor multifocal pneumonia possibly from an atypical infection.  4. Trace right pleural effusion.  5. Hepatosplenomegaly.        Electronically Signed: Austen Sage MD    3/19/2024 6:02 PM EDT    Workstation ID: [id number]    XR Chest 1 View [[id number]] Collected: 03/19/24 1548     Updated: 03/19/24 1551    Narrative:      XR CHEST 1 VW    Date of Exam: 3/19/2024 3:38 PM EDT    Indication: Dyspnea    Comparison: 5/25/2023.    Findings:  The lung volumes are diminished. The heart is enlarged. There is bilateral airspace disease which could be due to multifocal pneumonia or pulmonary edema. There are no moderate to large pleural effusions. There is no pneumothorax. There is a left upper   extremity PICC line with the tip terminating at the junction of the left brachiocephalic vein/superior vena cava. There are mild degenerative changes in the thoracolumbar spine.      Impression:      Impression:    1. Low lung volumes.  2. Cardiomegaly.  3. Bilateral airspace disease which could be due to multifocal pneumonia or pulmonary edema.      Electronically Signed: Austen Sage MD    3/19/2024 3:49 PM EDT    Workstation ID: [id number]            LAB RESULTS (LAST 7 DAYS)    CBC  Results from last 7 days   Lab Units 03/20/24  0332 03/19/24  1841 03/19/24  1521 03/19/24  1121 03/13/24  1300   WBC  10*3/mm3 29.28*  --  35.96* 31.94* 19.98*   RBC 10*6/mm3 2.69*  --  2.99* 2.50* 2.69*   HEMOGLOBIN g/dL 7.3* 7.8* 7.9* 6.5* 7.1*   HEMATOCRIT % 23.9* 25.6* 27.6* 22.2* 24.2*   MCV fL 88.8  --  92.3 88.8 90.0   PLATELETS 10*3/mm3 182  --  209 188 210       BMP  Results from last 7 days   Lab Units 03/19/24  1521   SODIUM mmol/L 133*   POTASSIUM mmol/L 5.1   CHLORIDE mmol/L 100   CO2 mmol/L 21.0*   BUN mg/dL 17   CREATININE mg/dL 0.84   GLUCOSE mg/dL 451*       CMP   Results from last 7 days   Lab Units 03/19/24  1521   SODIUM mmol/L 133*   POTASSIUM mmol/L 5.1   CHLORIDE mmol/L 100   CO2 mmol/L 21.0*   BUN mg/dL 17   CREATININE mg/dL 0.84   GLUCOSE mg/dL 451*   ALBUMIN g/dL 3.6   BILIRUBIN mg/dL 1.9*   ALK PHOS U/L 607*   AST (SGOT) U/L 20   ALT (SGPT) U/L 17       BNP        TROPONIN  Results from last 7 days   Lab Units 03/19/24  1521   HSTROP T ng/L 29*       CoAg        Creatinine Clearance  Estimated Creatinine Clearance: 80.9 mL/min (by C-G formula based on SCr of 0.84 mg/dL).    ABG          Radiology  CT Angiogram Chest Pulmonary Embolism    Result Date: 3/19/2024  Impression: 1. No acute pulmonary embolic disease. 2. Volume loss particular right side. The right hemidiaphragm is markedly elevated. This could be secondary to diaphragmatic paralysis. 3. Extensive right upper lobe and to a lesser degree right lower lobe pneumonia. There is also abnormal groundglass density and interstitial thickening. I would favor multifocal pneumonia possibly from an atypical infection. 4. Trace right pleural effusion. 5. Hepatosplenomegaly. Electronically Signed: Austen Sage MD  3/19/2024 6:02 PM EDT  Workstation ID: [id number]    XR Chest 1 View    Result Date: 3/19/2024  Impression: 1. Low lung volumes. 2. Cardiomegaly. 3. Bilateral airspace disease which could be due to multifocal pneumonia or pulmonary edema. Electronically Signed: Austen Sage MD  3/19/2024 3:49 PM EDT  Workstation ID: [id number]       EKG  I personally  viewed and interpreted the patient's EKG/Telemetry data:  ECG 12 Lead   ED Interpretation   Micaela Winkler PA     3/20/2024  1:24 AM   ECG 12 Lead         Date/Time: 3/19/2024 3:45 PM      Performed by: Micaela Winkler PA   Authorized by: Chandler Ibarra MD  Interpreted by ED physician   Previous ECG: no previous ECG available   Rhythm: sinus tachycardia   Rate: tachycardic   BPM: 117   QRS axis: normal   Conduction: conduction normal   Clinical impression: non-specific ECG      ECG 12 Lead Dyspnea   Final Result   HEART RATE= 117  bpm   RR Interval= 512  ms   MT Interval= 153  ms   P Horizontal Axis= 28  deg   P Front Axis= 48  deg   QRSD Interval= 90  ms   QT Interval= 351  ms   QTcB= 491  ms   QRS Axis= 95  deg   T Wave Axis= 1  deg   - ABNORMAL ECG -   Sinus tachycardia   Low voltage, precordial leads   Abnormal T, consider ischemia, anterior leads   Electronically Signed By: Chandler Ibarra (MARVIN) 20-Mar-2024 06:06:00   Date and Time of Study: 2024-03-19 15:13:36            Echocardiogram:    Results for orders placed during the hospital encounter of 03/10/23    Adult Transthoracic Echo Complete W/ Cont if Necessary Per Protocol    Interpretation Summary    Left ventricular systolic function is mildly decreased. Calculated left ventricular EF = 44%    The left ventricular cavity is moderately dilated.    Left ventricular diastolic function was indeterminate.    Severe tricuspid valve regurgitation is present.    Estimated right ventricular systolic pressure from tricuspid regurgitation is moderately elevated (45-55 mmHg).    The inferior vena cava is dilated. IVC inspiratory collapse is absent.        Stress Test:  Results for orders placed during the hospital encounter of 11/03/22    Stress Test With Myocardial Perfusion One Day    Interpretation Summary    Left ventricular ejection fraction is normal (Calculated EF = 50%).    Myocardial perfusion imaging indicates a normal myocardial perfusion study with no  evidence of ischemia.    Findings consistent with a normal ECG stress test.        Cardiac Catheterization:  No results found for this or any previous visit.        Other:      ASSESSMENT & PLAN:    Principal Problem:    Multifocal pneumonia  Active Problems:    CML (chronic myelocytic leukemia)    Medically noncompliant    Type 2 diabetes mellitus with diabetic polyneuropathy, with long-term current use of insulin    Acute respiratory failure with hypoxia    NICM (nonischemic cardiomyopathy)    Bilateral lower extremity edema    Anemia due to chemotherapy    Acute on chronic HFrEF (heart failure with reduced ejection fraction)      Multifocal pneumonia  Respiratory virus panel negative  On empiric antibiotics     Acute on chronic HFrEF (heart failure with reduced ejection fraction) secondary to NICM (nonischemic cardiomyopathy)  Bilateral lower extremity edema  Previous EF 26-30% in Nov. 2022, improved to 44% in Mar. 2023  Patient has been noncompliant with medications and outpatient follow up  High sensitivity troponin 29, proBNP 12,315 on admission  Repeat high sensitivity troponin   Obtain echocardiogram  Diuresis with Lasix 40 mg IV q12h  Start Toprol XL 25 mg daily, Jardiance 10 mg daily, and losartan 25 mg daily  Uptitrate GDMT as tolerated  Initiate Heart Failure Pathway  Consult HF Nurse Navigator  Strict I&Os and monitor daily weight  2 g Na diet    Anemia due to chemotherapy  Hemoglobin was 6.5 prior to admission and patient was transfused with 1 unit PRBC  Hemoglobin improved to 7.9  H&H today 7.3 / 23.9    Acute respiratory failure with hypoxia  Likely multifactorial, secondary to pneumonia, CHF, and anemia   Baseline:  room air  Currently on oxygen at 1 liter per nasal cannula   History of PE, but CT done this admission ruled out PE    CML (chronic myelocytic leukemia)  On chemotherapy, followed by oncology     Medically noncompliant  Compliance encouraged  / consulted   HF  Nurse Navigator consulted as above    Type 2 diabetes mellitus with diabetic polyneuropathy, with long-term current use of insulin  Check A1c  On Lantus and SSI      Electronically signed by JP Champagne, 03/20/24, 12:08 PM EDT.          Consulting MD mae provided more than 50% of the total visit time for this encounter      48-year-old with a history of nonischemic cardiomyopathy with a last known EF of 44%, CML on chemotherapy and type 2 diabetes with a history of medical noncompliance who presents with pneumonia.  She was sent in by her oncologist when she was found to be anemic with a hemoglobin of 6.5 and also was found to be hypoxic.  In the ER imaging were reviewed and showed large pneumonia.  She was also found to be volume overloaded with an elevated proBNP of 12,000.  She was started on broad-spectrum antibiotics and also given diuretics.  She said she has been off of all of her cardiac meds since March 2023.  She has not followed up regularly in the outpatient setting.  Says her breathing is still difficult.  Currently laying comfortably in bed.    REVIEW OF SYSTEMS:    Constitutional: Positive for weakness   Eyes: No vision changes, eye pain   ENT/oropharynx: No difficulty swallowing, sore throat, epistaxis, changes in hearing   Cardiovascular: No chest pain, chest tightness, palpitations, paroxysmal nocturnal dyspnea, orthopnea, diaphoresis, dizziness / syncopal episode   Respiratory: + shortness of breath, +dyspnea on exertion, cough, wheezing hemoptysis   Gastrointestinal: No abdominal pain, nausea, vomiting, diarrhea, bloody stools   Genitourinary: No hematuria, dysuria   Neurological: No headache, tremors, numbness,  one-sided weakness    Musculoskeletal: No cramps, myalgias,  joint pain, joint swelling   Integument: No rash, +edema     Telemetry shows sinus tachycardia  The patient is alert, oriented and in no distress.  Vital signs as noted above.  Head and neck revealed no carotid bruits  or jugular venous distention.  No thyromegaly or lymph adenopathy is present  Lungs decreased breath sounds at the bases with rhonchi and rales  Heart normal first and second heart sounds.tachycardic, no murmur.  No precordial rub is present.  No gallop is present.  Abdomen soft and nontender.  No organomegaly is present.  Extremities with good peripheral pulses without any pedal edema.  Skin warm and dry.  Musculoskeletal system is grossly normal  CNS grossly normal    Assessment and plan  48-year-old with a history of nonischemic cardiomyopathy and medical noncompliance presented with pneumonia.  Also found to be volume overloaded based on exam and admission proBNP of 12,000.  Her EF previously was 26 to 30% but then improved to 44% based on her last echo in March 2023.  She has been noncompliant since then and has been off of all of her cardiac meds.  I stressed to her the importance of compliance  I would like to repeat an echocardiogram  Continue Lasix 40 mg twice daily  Will start GDMT with Toprol 25 mg daily and losartan 25 mg daily along with Jardiance  She would benefit from heart failure clinic follow-up  She is being transfused for her anemia and her hemoglobin has improved.  She has also been on broad-spectrum antibiotics for her pneumonia.  She is on Lantus and sliding scale insulin for her diabetes.  She does have a history of PE but a CT PE done this admission does not show any evidence of PE at this time.        Electronically signed by Jane Hopper MD, 03/20/24, 3:06 PM EDT.

## 2024-03-20 NOTE — CASE MANAGEMENT/SOCIAL WORK
Discharge Planning Assessment   Mt     Patient Name: Nieves Mc  MRN: [id number]  Today's Date: 3/20/2024    Admit Date: 3/19/2024    Plan: COY PLAN: From routine home with mom. Current Caregiver thru [employer] daily 8-4. Current with [facility](will need batsheva) Will need PCP       Discharge Needs Assessment       Row Name 03/20/24 1154       Living Environment    People in Home parent(s)    Name(s) of People in Home Lives with mom and daughter    Current Living Arrangements home    Potentially Unsafe Housing Conditions none    In the past 12 months has the electric, gas, oil, or water company threatened to shut off services in your home? No    Primary Care Provided by self    Provides Primary Care For no one    Family Caregiver if Needed spouse    Quality of Family Relationships helpful;involved;supportive    Able to Return to Prior Arrangements yes       Resource/Environmental Concerns    Resource/Environmental Concerns none    Transportation Concerns none       Transportation Needs    In the past 12 months, has lack of transportation kept you from medical appointments or from getting medications? no    In the past 12 months, has lack of transportation kept you from meetings, work, or from getting things needed for daily living? No       Food Insecurity    Within the past 12 months, you worried that your food would run out before you got the money to buy more. Never true    Within the past 12 months, the food you bought just didn't last and you didn't have money to get more. Never true       Transition Planning    Patient/Family Anticipates Transition to home with family    Patient/Family Anticipated Services at Transition none    Transportation Anticipated car, drives self;family or friend will provide       Discharge Needs Assessment    Readmission Within the Last 30 Days no previous admission in last 30 days    Equipment Currently Used at Home walker, rolling;wheelchair    Anticipated Changes Related to  Illness none    Equipment Needed After Discharge none                   Discharge Plan       Row Name 03/20/24 1155       Plan    Plan DC PLAN: From routine home with mom. Current Caregiver thru [employer] daily 8-4. Current with [facility](will need batsheva) Will need PCP    Patient/Family in Agreement with Plan yes    Plan Comments Met with patient at bedside, from routine home with mom and daughter. Needs assistance with ADL's uses wheelchair, BSC and hospital bedside table. No PCP listed, Requests one in [place], [place]. Pharmacy is [facility], able to afford medications and denies any issues with food or utiliies. Denies any transportation issues, caregivers will provide. Denies any additional HHC or SNF needs, Denies any concerns about return home. Pending Clinical course.                      Continued Care and Services - Admitted Since 3/19/2024    No active coordination exists for this encounter.       Selected Continued Care - Episodes Includes continued care and service providers with selected services from the active episodes listed below      Oncology- External Fill Episode start date: 3/15/2023   There are no active outsourced providers for this episode.                 Expected Discharge Date and Time       Expected Discharge Date Expected Discharge Time    Mar 21, 2024            Demographic Summary       Row Name 03/20/24 1153       General Information    Admission Type inpatient    Arrived From emergency department    Required Notices Provided Important Message from Medicare    Referral Source admission list    Reason for Consult discharge planning    Preferred Language English       Contact Information    Permission Granted to Share Info With     Contact Information Obtained for                    Functional Status       Row Name 03/20/24 1154       Functional Status    Usual Activity Tolerance poor    Current Activity Tolerance poor       Physical Activity    On average, how many days  per week do you engage in moderate to strenuous exercise (like a brisk walk)? 0 days    On average, how many minutes do you engage in exercise at this level? 0 min    Number of minutes of exercise per week 0       Assessment of Health Literacy    How often do you have someone help you read hospital materials? Sometimes    How often do you have problems learning about your medical condition because of difficulty understanding written information? Sometimes    How often do you have a problem understanding what is told to you about your medical condition? Sometimes    How confident are you filling out medical forms by yourself? Somewhat    Health Literacy Moderate       Functional Status, IADL    Medications completely dependent    Meal Preparation completely dependent    Housekeeping completely dependent    Laundry completely dependent    Shopping completely dependent       Mental Status    General Appearance WDL WDL       Mental Status Summary    Recent Changes in Mental Status/Cognitive Functioning no changes                  Sharon Lovett RN   Case Management

## 2024-03-20 NOTE — CONSULTS
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 poor tolerance. She was willing to retry ponatinib and this was restarted 10 mg every other day.   Normocytic anemia: continue to monitor CBC and supportive transfusions as needed.  Multifocal pneumonia: Blood cultures pending. Patient on Vancomycin, Cefepime, Azithromycin. Antibiotic and supportive care per primary team.   Leukocytosis: Acute increase likely secondary to multifocal pneumonia.  Acute respiratory failure with hypoxia: Likely multifactorial, secondary to pneumonia, CHF. Currently on oxygen 1L per nasal cannula. Imaging of chest with no evidence of PE.    PLAN  Okay to hold ponatinib with acute infection  Monitor CBC daily  Supportive care per primary team  Further recommendations per Dr. Lerma.     Electronically signed by Esther Tenorio PA-C, 03/20/24  Thank you for this consult. We will be happy to follow along with you.      Hematology/Oncology was consulted. She is established with Dr. Lerma for CML. She recently switched treatments from dasatnib to ponatinib. She reports she started on Monday 3/18/24 (dosing is 10mg every other day).     Patient with a diagnosis of CML for many years noncompliant with medicines.  Patient was receiving blood transfusion and was noted to be hypoxic she was then  sent to the emergency room for further evaluation.  In the emergency room she had a CT scan of the chest which showed no acute PE but she had extensive right upper lobe right lower lobe pneumonia with groundglass opacification.  Patient was currently placed on ponatinib.  She has been admitted to the hospital for IV antibiotics, respiratory care.    Physical exam significant for decreased breath sounds on the right she has 1+ pitting edema on both lower extremities and generalized anasarca    Reviewed her labs, imaging studies progress notes  Continue IV antibiotics  Continue supportive care  Hold ponatinib for now  Daily CBCs  Will continue to follow        Electronically signed by Meghann Lerma MD, 03/21/24, 6:32 PM EDT.

## 2024-03-20 NOTE — CASE MANAGEMENT/SOCIAL WORK
Social Determinants of Health     Tobacco Use: Low Risk  (3/20/2024)    Patient History     Smoking Tobacco Use: Never     Smokeless Tobacco Use: Never     Passive Exposure: Not on file   Alcohol Use: Not At Risk (3/20/2024)    AUDIT-C     Frequency of Alcohol Consumption: Never     Average Number of Drinks: Patient does not drink     Frequency of Binge Drinking: Never   Financial Resource Strain: Low Risk  (3/20/2024)    Overall Financial Resource Strain (CARDIA)     Difficulty of Paying Living Expenses: Not very hard   Food Insecurity: No Food Insecurity (3/20/2024)    Hunger Vital Sign     Worried About Running Out of Food in the Last Year: Never true     Ran Out of Food in the Last Year: Never true   Transportation Needs: No Transportation Needs (3/20/2024)    PRAPARE - Transportation     Lack of Transportation (Medical): No     Lack of Transportation (Non-Medical): No   Physical Activity: Inactive (3/20/2024)    Exercise Vital Sign     Days of Exercise per Week: 0 days     Minutes of Exercise per Session: 0 min   Stress: No Stress Concern Present (3/20/2024)    [employer] [place] [employer] - Occupational Stress Questionnaire     Feeling of Stress : Not at all   Social Connections: Not At Risk (3/20/2024)    Family and Community Support     Help with Day-to-Day Activities: I don't need any help     Lonely or Isolated: Rarely   Interpersonal Safety: Not At Risk (3/20/2024)    Abuse Screen     Unsafe at Home or Work/School: no     Feels Threatened by Someone?: no     Does Anyone Keep You from Contacting Others or Doint Things Outside the Home?: no     Physical Sign of Abuse Present: no   Depression: Not at risk (3/20/2024)    PHQ-2     PHQ-2 Score: 0   Housing Stability: Not At Risk (3/20/2024)    Housing Stability     Current Living Arrangements: home     Potentially Unsafe Housing Conditions: none   Utilities: Not At Risk (3/20/2024)    C Utilities     Threatened with loss of utilities: No   Health  Literacy: Not At Risk (3/20/2024)    Education     Help with school or training?: No     Preferred Language: English   Employment: Not At Risk (3/20/2024)    Employment     Do you want help finding or keeping work or a job?: I do not need or want help   Disabilities: Not At Risk (3/20/2024)    Disabilities     Concentrating, Remembering, or Making Decisions Difficulty: no     Doing Errands Independently Difficulty: no   Recent Concern: Disabilities - At Risk (3/19/2024)    Disabilities     Concentrating, Remembering, or Making Decisions Difficulty: no     Doing Errands Independently Difficulty: yes

## 2024-03-20 NOTE — PROGRESS NOTES
"Pharmacy Antimicrobial Dosing Service    Subjective:  Nieves Mc is a 48 y.o.female admitted with SOA. Pharmacy has been consulted to dose Vancomycin and Cefepime for possible pneumonia.    PMH: DM, CML on chemo      Assessment/Plan    1. Day #2 Vancomycin: Goal -600 mcg*h/mL. 1250mg (~20mg/kg ABW) IV x1 dose followed by 750mg (~12mg/kg ABW) IV q12h.  Random level 3/20 at 0851= 23.10 mcg/ml for predicted AUC= 426 mcg*h/ml and trough 11.9 mcg/ml. Will increase dose to 1000 mg every 12 hours (~16 mg/kg ABW) for predicted AUC= 566 mcg*h/ml and trough 15.9 mcg/ml.     2. Day #2 Cefepime: 2000mg IV q8h for estCrCl > 60 mL/min.    3. Day #2 Azithromycin: 500mg IV q24h    Will continue to monitor drug levels, renal function, culture and sensitivities, and patient clinical status.       Objective:  Relevant clinical data and objective history reviewed:  160 cm (62.99\")   62.6 kg (137 lb 15.8 oz)   Ideal body weight: 52.4 kg (115 lb 7.7 oz)  Adjusted ideal body weight: 56.5 kg (124 lb 7.7 oz)  Body mass index is 24.45 kg/m².    Results from last 7 days   Lab Units 03/20/24  0851   VANCOMYCIN RM mcg/mL 23.10     Results from last 7 days   Lab Units 03/20/24  0851 03/19/24  1521   CREATININE mg/dL 0.67 0.84     Estimated Creatinine Clearance: 101.5 mL/min (by C-G formula based on SCr of 0.67 mg/dL).  I/O last 3 completed shifts:  In: 350 [IV Piggyback:350]  Out: -     Results from last 7 days   Lab Units 03/20/24  0332 03/19/24  1521 03/19/24  1121   WBC 10*3/mm3 29.28* 35.96* 31.94*     Temperature    03/19/24 1601 03/20/24 0330 03/20/24 0653   Temp: 98.4 °F (36.9 °C) 98.2 °F (36.8 °C) 97.9 °F (36.6 °C)     Baseline culture/source/susceptibility:  Microbiology Results (last 10 days)       Procedure Component Value - Date/Time    Respiratory Panel PCR w/COVID-19(SARS-CoV-2) [facility]EYAD/EUGENIA/MARVIN/PAD/COR/CARRIE In-House, NP Swab in UTM/VTM, 2 HR TAT - Swab, Nasopharynx [[id number]]  (Normal) Collected: 03/19/24 1521    Lab Status: " Final result Specimen: Swab from Nasopharynx Updated: 03/19/24 1628     ADENOVIRUS, PCR Not Detected     Coronavirus 229E Not Detected     Coronavirus HKU1 Not Detected     Coronavirus NL63 Not Detected     Coronavirus OC43 Not Detected     COVID19 Not Detected     Human Metapneumovirus Not Detected     Human Rhinovirus/Enterovirus Not Detected     Influenza A PCR Not Detected     Influenza B PCR Not Detected     Parainfluenza Virus 1 Not Detected     Parainfluenza Virus 2 Not Detected     Parainfluenza Virus 3 Not Detected     Parainfluenza Virus 4 Not Detected     RSV, PCR Not Detected     Bordetella pertussis pcr Not Detected     Bordetella parapertussis PCR Not Detected     Chlamydophila pneumoniae PCR Not Detected     Mycoplasma pneumo by PCR Not Detected    Narrative:      In the setting of a positive respiratory panel with a viral infection PLUS a negative procalcitonin without other underlying concern for bacterial infection, consider observing off antibiotics or discontinuation of antibiotics and continue supportive care. If the respiratory panel is positive for atypical bacterial infection (Bordetella pertussis, Chlamydophila pneumoniae, or Mycoplasma pneumoniae), consider antibiotic de-escalation to target atypical bacterial infection.            Jennifer Echols, PharmD  03/20/24 10:45 EDT

## 2024-03-21 ENCOUNTER — HOME CARE VISIT (OUTPATIENT)
Dept: HOME HEALTH SERVICES | Facility: HOME HEALTHCARE | Age: 49
End: 2024-03-21
Payer: COMMERCIAL

## 2024-03-21 ENCOUNTER — APPOINTMENT (OUTPATIENT)
Dept: CARDIOLOGY | Facility: HOSPITAL | Age: 49
DRG: 193 | End: 2024-03-21
Payer: MEDICARE

## 2024-03-21 LAB
ANION GAP SERPL CALCULATED.3IONS-SCNC: 11 MMOL/L (ref 5–15)
BH CV ECHO MEAS - ACS: 1.9 CM
BH CV ECHO MEAS - AO MAX PG: 7.8 MMHG
BH CV ECHO MEAS - AO MEAN PG: 5 MMHG
BH CV ECHO MEAS - AO ROOT DIAM: 2.9 CM
BH CV ECHO MEAS - AO V2 MAX: 140 CM/SEC
BH CV ECHO MEAS - AO V2 VTI: 24.8 CM
BH CV ECHO MEAS - AVA(I,D): 2.8 CM2
BH CV ECHO MEAS - EDV(CUBED): 117.6 ML
BH CV ECHO MEAS - EDV(MOD-SP4): 58.2 ML
BH CV ECHO MEAS - EF(MOD-BP): 62 %
BH CV ECHO MEAS - EF(MOD-SP4): 62 %
BH CV ECHO MEAS - ESV(MOD-SP4): 22.1 ML
BH CV ECHO MEAS - FS: 28.6 %
BH CV ECHO MEAS - IVS/LVPW: 1 CM
BH CV ECHO MEAS - IVSD: 0.8 CM
BH CV ECHO MEAS - LA DIMENSION: 3.4 CM
BH CV ECHO MEAS - LAT PEAK E' VEL: 12.1 CM/SEC
BH CV ECHO MEAS - LV DIASTOLIC VOL/BSA (35-75): 32.9 CM2
BH CV ECHO MEAS - LV MASS(C)D: 131.2 GRAMS
BH CV ECHO MEAS - LV MAX PG: 5.6 MMHG
BH CV ECHO MEAS - LV MEAN PG: 3 MMHG
BH CV ECHO MEAS - LV SYSTOLIC VOL/BSA (12-30): 12.5 CM2
BH CV ECHO MEAS - LV V1 MAX: 118 CM/SEC
BH CV ECHO MEAS - LV V1 VTI: 21.9 CM
BH CV ECHO MEAS - LVIDD: 4.9 CM
BH CV ECHO MEAS - LVIDS: 3.5 CM
BH CV ECHO MEAS - LVOT AREA: 3.1 CM2
BH CV ECHO MEAS - LVOT DIAM: 2 CM
BH CV ECHO MEAS - LVPWD: 0.8 CM
BH CV ECHO MEAS - MED PEAK E' VEL: 9.8 CM/SEC
BH CV ECHO MEAS - MV A DUR: 0.11 SEC
BH CV ECHO MEAS - MV A MAX VEL: 114 CM/SEC
BH CV ECHO MEAS - MV DEC TIME: 0.09 SEC
BH CV ECHO MEAS - MV E MAX VEL: 90.8 CM/SEC
BH CV ECHO MEAS - MV E/A: 0.8
BH CV ECHO MEAS - PA V2 MAX: 98.4 CM/SEC
BH CV ECHO MEAS - PULM A REVS DUR: 0.11 SEC
BH CV ECHO MEAS - PULM A REVS VEL: 23.3 CM/SEC
BH CV ECHO MEAS - PULM DIAS VEL: 38.3 CM/SEC
BH CV ECHO MEAS - PULM S/D: 1.45
BH CV ECHO MEAS - PULM SYS VEL: 55.4 CM/SEC
BH CV ECHO MEAS - RAP SYSTOLE: 3 MMHG
BH CV ECHO MEAS - RV MAX PG: 1.59 MMHG
BH CV ECHO MEAS - RV V1 MAX: 63.1 CM/SEC
BH CV ECHO MEAS - RV V1 VTI: 10.8 CM
BH CV ECHO MEAS - RVSP: 37.1 MMHG
BH CV ECHO MEAS - SI(MOD-SP4): 20.4 ML/M2
BH CV ECHO MEAS - SV(LVOT): 68.8 ML
BH CV ECHO MEAS - SV(MOD-SP4): 36.1 ML
BH CV ECHO MEAS - TAPSE (>1.6): 1.84 CM
BH CV ECHO MEAS - TR MAX PG: 34.1 MMHG
BH CV ECHO MEAS - TR MAX VEL: 292 CM/SEC
BH CV ECHO MEASUREMENTS AVERAGE E/E' RATIO: 8.29
BH CV XLRA - RV BASE: 3.5 CM
BH CV XLRA - RV LENGTH: 8.3 CM
BH CV XLRA - RV MID: 2.7 CM
BH CV XLRA - TDI S': 9.1 CM/SEC
BUN SERPL-MCNC: 19 MG/DL (ref 6–20)
BUN/CREAT SERPL: 22.6 (ref 7–25)
CALCIUM SPEC-SCNC: 8.6 MG/DL (ref 8.6–10.5)
CHLORIDE SERPL-SCNC: 105 MMOL/L (ref 98–107)
CO2 SERPL-SCNC: 25 MMOL/L (ref 22–29)
CREAT SERPL-MCNC: 0.84 MG/DL (ref 0.57–1)
DEPRECATED RDW RBC AUTO: 55.6 FL (ref 37–54)
EGFRCR SERPLBLD CKD-EPI 2021: 85.8 ML/MIN/1.73
ERYTHROCYTE [DISTWIDTH] IN BLOOD BY AUTOMATED COUNT: 17.2 % (ref 12.3–15.4)
GLUCOSE BLDC GLUCOMTR-MCNC: 146 MG/DL (ref 70–105)
GLUCOSE BLDC GLUCOMTR-MCNC: 150 MG/DL (ref 70–105)
GLUCOSE BLDC GLUCOMTR-MCNC: 216 MG/DL (ref 70–105)
GLUCOSE BLDC GLUCOMTR-MCNC: 249 MG/DL (ref 70–105)
GLUCOSE SERPL-MCNC: 210 MG/DL (ref 65–99)
HCT VFR BLD AUTO: 25.2 % (ref 34–46.6)
HGB BLD-MCNC: 7.5 G/DL (ref 12–15.9)
MAGNESIUM SERPL-MCNC: 1.9 MG/DL (ref 1.6–2.6)
MCH RBC QN AUTO: 26.6 PG (ref 26.6–33)
MCHC RBC AUTO-ENTMCNC: 29.8 G/DL (ref 31.5–35.7)
MCV RBC AUTO: 89.4 FL (ref 79–97)
PLATELET # BLD AUTO: 209 10*3/MM3 (ref 140–450)
PMV BLD AUTO: 10.4 FL (ref 6–12)
POTASSIUM SERPL-SCNC: 4.4 MMOL/L (ref 3.5–5.2)
QT INTERVAL: 351 MS
QTC INTERVAL: 550 MS
RBC # BLD AUTO: 2.82 10*6/MM3 (ref 3.77–5.28)
SINUS: 3.4 CM
SODIUM SERPL-SCNC: 141 MMOL/L (ref 136–145)
STJ: 3.1 CM
VANCOMYCIN SERPL-MCNC: 37.7 MCG/ML (ref 5–40)
WBC NRBC COR # BLD AUTO: 33.43 10*3/MM3 (ref 3.4–10.8)

## 2024-03-21 PROCEDURE — 99233 SBSQ HOSP IP/OBS HIGH 50: CPT | Performed by: INTERNAL MEDICINE

## 2024-03-21 PROCEDURE — 25010000002 CEFEPIME PER 500 MG: Performed by: STUDENT IN AN ORGANIZED HEALTH CARE EDUCATION/TRAINING PROGRAM

## 2024-03-21 PROCEDURE — 93306 TTE W/DOPPLER COMPLETE: CPT

## 2024-03-21 PROCEDURE — 82948 REAGENT STRIP/BLOOD GLUCOSE: CPT | Performed by: HOSPITALIST

## 2024-03-21 PROCEDURE — 80202 ASSAY OF VANCOMYCIN: CPT | Performed by: STUDENT IN AN ORGANIZED HEALTH CARE EDUCATION/TRAINING PROGRAM

## 2024-03-21 PROCEDURE — 93005 ELECTROCARDIOGRAM TRACING: CPT | Performed by: INTERNAL MEDICINE

## 2024-03-21 PROCEDURE — 25810000003 SODIUM CHLORIDE 0.9 % SOLUTION 250 ML FLEX CONT: Performed by: PHYSICIAN ASSISTANT

## 2024-03-21 PROCEDURE — 25010000002 CEFEPIME PER 500 MG: Performed by: HOSPITALIST

## 2024-03-21 PROCEDURE — 80048 BASIC METABOLIC PNL TOTAL CA: CPT | Performed by: NURSE PRACTITIONER

## 2024-03-21 PROCEDURE — 25010000002 ENOXAPARIN PER 10 MG: Performed by: HOSPITALIST

## 2024-03-21 PROCEDURE — 83735 ASSAY OF MAGNESIUM: CPT | Performed by: NURSE PRACTITIONER

## 2024-03-21 PROCEDURE — 25010000002 VANCOMYCIN 1 G RECONSTITUTED SOLUTION 1 EACH VIAL: Performed by: STUDENT IN AN ORGANIZED HEALTH CARE EDUCATION/TRAINING PROGRAM

## 2024-03-21 PROCEDURE — 25010000002 FUROSEMIDE PER 20 MG: Performed by: HOSPITALIST

## 2024-03-21 PROCEDURE — 63710000001 INSULIN GLARGINE PER 5 UNITS: Performed by: HOSPITALIST

## 2024-03-21 PROCEDURE — 85027 COMPLETE CBC AUTOMATED: CPT | Performed by: NURSE PRACTITIONER

## 2024-03-21 PROCEDURE — 63710000001 INSULIN LISPRO (HUMAN) PER 5 UNITS: Performed by: HOSPITALIST

## 2024-03-21 PROCEDURE — 82948 REAGENT STRIP/BLOOD GLUCOSE: CPT

## 2024-03-21 PROCEDURE — 25010000002 AZITHROMYCIN PER 500 MG: Performed by: PHYSICIAN ASSISTANT

## 2024-03-21 PROCEDURE — 93306 TTE W/DOPPLER COMPLETE: CPT | Performed by: INTERNAL MEDICINE

## 2024-03-21 PROCEDURE — 25810000003 SODIUM CHLORIDE 0.9 % SOLUTION 250 ML FLEX CONT: Performed by: STUDENT IN AN ORGANIZED HEALTH CARE EDUCATION/TRAINING PROGRAM

## 2024-03-21 RX ORDER — FUROSEMIDE 40 MG/1
40 TABLET ORAL DAILY
Status: DISCONTINUED | OUTPATIENT
Start: 2024-03-22 | End: 2024-03-25 | Stop reason: HOSPADM

## 2024-03-21 RX ORDER — METOPROLOL SUCCINATE 25 MG/1
25 TABLET, EXTENDED RELEASE ORAL
Status: DISCONTINUED | OUTPATIENT
Start: 2024-03-21 | End: 2024-03-22

## 2024-03-21 RX ORDER — METOPROLOL TARTRATE 1 MG/ML
5 INJECTION, SOLUTION INTRAVENOUS ONCE
Status: COMPLETED | OUTPATIENT
Start: 2024-03-21 | End: 2024-03-21

## 2024-03-21 RX ADMIN — Medication 10 ML: at 21:41

## 2024-03-21 RX ADMIN — INSULIN LISPRO 3 UNITS: 100 INJECTION, SOLUTION INTRAVENOUS; SUBCUTANEOUS at 21:40

## 2024-03-21 RX ADMIN — INSULIN GLARGINE 25 UNITS: 100 INJECTION, SOLUTION SUBCUTANEOUS at 21:40

## 2024-03-21 RX ADMIN — INSULIN LISPRO 2 UNITS: 100 INJECTION, SOLUTION INTRAVENOUS; SUBCUTANEOUS at 08:32

## 2024-03-21 RX ADMIN — CEFEPIME 2000 MG: 2 INJECTION, POWDER, FOR SOLUTION INTRAVENOUS at 21:40

## 2024-03-21 RX ADMIN — INSULIN LISPRO 3 UNITS: 100 INJECTION, SOLUTION INTRAVENOUS; SUBCUTANEOUS at 17:03

## 2024-03-21 RX ADMIN — METOPROLOL TARTRATE 5 MG: 1 INJECTION, SOLUTION INTRAVENOUS at 21:46

## 2024-03-21 RX ADMIN — CEFEPIME 2000 MG: 2 INJECTION, POWDER, FOR SOLUTION INTRAVENOUS at 14:52

## 2024-03-21 RX ADMIN — LOSARTAN POTASSIUM 25 MG: 25 TABLET, FILM COATED ORAL at 08:32

## 2024-03-21 RX ADMIN — EMPAGLIFLOZIN 10 MG: 10 TABLET, FILM COATED ORAL at 08:32

## 2024-03-21 RX ADMIN — Medication 10 ML: at 08:32

## 2024-03-21 RX ADMIN — GABAPENTIN 800 MG: 400 CAPSULE ORAL at 05:12

## 2024-03-21 RX ADMIN — METOPROLOL SUCCINATE 25 MG: 25 TABLET, FILM COATED, EXTENDED RELEASE ORAL at 21:46

## 2024-03-21 RX ADMIN — FUROSEMIDE 40 MG: 10 INJECTION, SOLUTION INTRAMUSCULAR; INTRAVENOUS at 05:12

## 2024-03-21 RX ADMIN — GABAPENTIN 800 MG: 400 CAPSULE ORAL at 21:40

## 2024-03-21 RX ADMIN — ALPRAZOLAM 0.5 MG: 0.5 TABLET ORAL at 21:40

## 2024-03-21 RX ADMIN — GABAPENTIN 800 MG: 400 CAPSULE ORAL at 14:52

## 2024-03-21 RX ADMIN — SODIUM CHLORIDE 1000 MG: 9 INJECTION, SOLUTION INTRAVENOUS at 09:22

## 2024-03-21 RX ADMIN — CEFEPIME 2000 MG: 2 INJECTION, POWDER, FOR SOLUTION INTRAVENOUS at 05:11

## 2024-03-21 RX ADMIN — ENOXAPARIN SODIUM 40 MG: 100 INJECTION SUBCUTANEOUS at 17:03

## 2024-03-21 RX ADMIN — AZITHROMYCIN MONOHYDRATE 500 MG: 500 INJECTION, POWDER, LYOPHILIZED, FOR SOLUTION INTRAVENOUS at 20:18

## 2024-03-21 NOTE — Clinical Note
Transfer Summary  Pt transferred to [facility]  Reason for Transfer multifocal PNA  Report called to   Summary of Care treatment or services provided to the patient including disciplines seeing the patient: SN and PT   Patient's progress towards goals ongoing  Communicable Disease If yes, type none

## 2024-03-21 NOTE — PLAN OF CARE
Goal Outcome Evaluation:              Outcome Evaluation: Pt. has been resting throughout the shift with no complaints. Pt. remains on IV abx. Pt. remains on 2L O2. Pt. A&Ox4, AxSB to BSC. Pt. is able to make needs known.

## 2024-03-21 NOTE — PROGRESS NOTES
[facility] MEDICINE SERVICE  DAILY PROGRESS NOTE    NAME: Nieves Mc  : 1975  MRN: [id number]      LOS: 2 days     PROVIDER OF SERVICE: Gilda Morgan MD    Chief Complaint: Multifocal pneumonia    Subjective:     Interval History:  History taken from: patient  Patient Complaints: Patient reports feeling much better today.  Breathing is improving.  Leukocytosis continues to worsen likely secondary to CML.    Patient Denies: Chest pain, lightness, dizziness, fever rigors and chills.    Review of Systems:   All systems reviewed and negative except as mentioned above.    Objective:     Vital Signs  Temp:  [97.1 °F (36.2 °C)-97.8 °F (36.6 °C)] 97.8 °F (36.6 °C)  Heart Rate:  [] 106  Resp:  [11-16] 12  BP: ()/(66-78) 100/70  Flow (L/min):  [2-3] 2   Body mass index is 28.7 kg/m².    Physical Exam  Physical Exam  Constitutional:       Appearance: Normal appearance.   HENT:      Head: Normocephalic and atraumatic.      Mouth/Throat:      Mouth: Mucous membranes are moist.   Eyes:      Pupils: Pupils are equal, round, and reactive to light.   Cardiovascular:      Rate and Rhythm: Normal rate and regular rhythm.   Pulmonary:      Effort: Pulmonary effort is normal.      Breath sounds: Rales present.   Abdominal:      General: Bowel sounds are normal.      Palpations: Abdomen is soft.      Tenderness: There is no abdominal tenderness.   Musculoskeletal:         General: Normal range of motion.      Cervical back: Normal range of motion.   Skin:     General: Skin is warm and dry.   Neurological:      General: No focal deficit present.      Mental Status: She is alert and oriented to person, place, and time.   Psychiatric:         Behavior: Behavior normal.         Scheduled Meds   azithromycin, 500 mg, Intravenous, Q24H  cefepime, 2,000 mg, Intravenous, Q8H  empagliflozin, 10 mg, Oral, Daily  enoxaparin, 40 mg, Subcutaneous, Daily  [START ON 3/22/2024] furosemide, 40 mg, Oral,  Subjective    Patient: Miya Zee   MRN: [id number]  : 1966    Reason For Visit  Patient presents today with Annual Exam      HPI  Here for annual wellness  Has annual mammo appt today, colonoscopy is UTD  Saw [facility] for DUB, had D and C . No further vaginal bleeding.   Pt consulted with endocrinology, dr Gregory for thyroiditis, forgot to follow up in January since she was busy at [facility] for DUB workup and treatment.  Pt feels great, son got engaged to be   Pt gets 60 min of walking daily,      Review of Systems   Constitutional: Negative for activity change, chills, fever and unexpected weight change.   HENT: Negative.    Respiratory: Negative for cough, shortness of breath and wheezing.    Cardiovascular: Negative for chest pain, palpitations and leg swelling.   Gastrointestinal: Negative for abdominal pain, blood in stool, constipation, diarrhea, nausea and vomiting.   Genitourinary: Negative for difficulty urinating, dysuria and vaginal bleeding.   Musculoskeletal: Negative for arthralgias, back pain and joint swelling.   Skin: Negative for rash.        Chronic dry skin   Allergic/Immunologic: Positive for environmental allergies.   Neurological: Negative for weakness, numbness and headaches.   Psychiatric/Behavioral: Negative for dysphoric mood and sleep disturbance. The patient is not nervous/anxious.        ALLERGIES:  Allergies no known allergies    Current Outpatient Medications   Medication Sig Dispense Refill   • loratadine (CLARITIN) 10 MG tablet Take 10 mg by mouth daily. Indications: Hayfever       No current facility-administered medications for this visit.        Patient Active Problem List   Diagnosis   • Abnormal WBC count   • Iron deficiency anemia   • Thyroiditis   • Thyroid nodule   • Irregular menstrual cycle   • Abnormal mammogram   • Postmenopausal   • Routine general medical examination at a health care facility       Social History     Substance and Sexual Activity    Alcohol Use Yes   • Alcohol/week: 1.2 oz   • Types: 2 Glasses of wine per week    Comment: WEEKENDS       Social History     Social History Narrative   • Not on file       Review of patient's social economics indicates:  No social economics status on file        Drug Use:    Never               Past medical history, problem list, family medical history, surgical history, and social history have all been reviewed    Objective    Visit Vitals  /80   Pulse 84   Resp 16   Ht 5' 3\" (1.6 m)   Wt 61 kg (134 lb 7.7 oz)   BMI 23.82 kg/m²       Physical Exam   Constitutional: She is oriented to person, place, and time. She appears well-nourished.   HENT:   Head: Atraumatic.   Right Ear: Tympanic membrane and ear canal normal.   Left Ear: Tympanic membrane and ear canal normal.   Nose: No mucosal edema or rhinorrhea.   Mouth/Throat: Oropharynx is clear and moist. No oral lesions.   Unremarkable ENT exam   Eyes: Pupils are equal, round, and reactive to light. Conjunctivae and EOM are normal. Right eye exhibits no discharge. Left eye exhibits no discharge.   Neck: Neck supple. Thyromegaly (prominent, nontender, irregular thyroid) present.   Cardiovascular: Normal rate, regular rhythm, normal heart sounds and intact distal pulses.   No murmur heard.  Pulses:       Radial pulses are 2+ on the right side, and 2+ on the left side.        Dorsalis pedis pulses are 2+ on the right side, and 2+ on the left side.   Pulmonary/Chest: Effort normal and breath sounds normal. She has no wheezes. Right breast exhibits no mass and no nipple discharge. Left breast exhibits no mass and no nipple discharge. No breast tenderness or discharge.   Abdominal: Soft. Bowel sounds are normal. She exhibits no distension and no mass. There is no tenderness. No hernia.   Genitourinary: Rectum normal, vagina normal and uterus normal. No breast tenderness or discharge. No vaginal discharge found.   Musculoskeletal: Normal range of motion. She exhibits  Daily  gabapentin, 800 mg, Oral, Q8H  insulin glargine, 25 Units, Subcutaneous, Nightly  insulin lispro, 2-7 Units, Subcutaneous, 4x Daily AC & at Bedtime  losartan, 25 mg, Oral, Q24H  metoprolol succinate XL, 25 mg, Oral, Q24H  sodium chloride, 10 mL, Intravenous, Q12H       PRN Meds     acetaminophen    ALPRAZolam    senna-docusate sodium **AND** polyethylene glycol **AND** bisacodyl **AND** bisacodyl    Calcium Replacement - Follow Nurse / BPA Driven Protocol    dextrose    dextrose    glucagon (human recombinant)    ipratropium-albuterol    Magnesium Standard Dose Replacement - Follow Nurse / BPA Driven Protocol    nitroglycerin    ondansetron    Pharmacy to Dose Cefepime    Phosphorus Replacement - Follow Nurse / BPA Driven Protocol    Potassium Replacement - Follow Nurse / BPA Driven Protocol    sodium chloride    sodium chloride    sodium chloride   Infusions  pain,   Pharmacy to Dose Cefepime,           Diagnostic Data    Results from last 7 days   Lab Units 03/21/24  0151 03/19/24  1841 03/19/24  1521   WBC 10*3/mm3 33.43*   < > 35.96*   HEMOGLOBIN g/dL 7.5*   < > 7.9*   HEMATOCRIT % 25.2*   < > 27.6*   PLATELETS 10*3/mm3 209   < > 209   GLUCOSE mg/dL 210*   < > 451*   CREATININE mg/dL 0.84   < > 0.84   BUN mg/dL 19   < > 17   SODIUM mmol/L 141   < > 133*   POTASSIUM mmol/L 4.4   < > 5.1   AST (SGOT) U/L  --   --  20   ALT (SGPT) U/L  --   --  17   ALK PHOS U/L  --   --  607*   BILIRUBIN mg/dL  --   --  1.9*   ANION GAP mmol/L 11.0   < > 12.0    < > = values in this interval not displayed.       CT Angiogram Chest Pulmonary Embolism    Result Date: 3/19/2024  Impression: 1. No acute pulmonary embolic disease. 2. Volume loss particular right side. The right hemidiaphragm is markedly elevated. This could be secondary to diaphragmatic paralysis. 3. Extensive right upper lobe and to a lesser degree right lower lobe pneumonia. There is also abnormal groundglass density and interstitial thickening. I would favor  multifocal pneumonia possibly from an atypical infection. 4. Trace right pleural effusion. 5. Hepatosplenomegaly. Electronically Signed: Austen Sage MD  3/19/2024 6:02 PM EDT  Workstation ID: [id number]    XR Chest 1 View    Result Date: 3/19/2024  Impression: 1. Low lung volumes. 2. Cardiomegaly. 3. Bilateral airspace disease which could be due to multifocal pneumonia or pulmonary edema. Electronically Signed: Austen Sage MD  3/19/2024 3:49 PM EDT  Workstation ID: [id number]       I reviewed the patient's new clinical results.    Assessment/Plan:     Active and Resolved Problems  Active Hospital Problems    Diagnosis  POA    **Multifocal pneumonia [J18.9]  Yes    Bilateral lower extremity edema [R60.0]  Yes    Acute on chronic HFrEF (heart failure with reduced ejection fraction) [I50.23]  Yes    Acute respiratory failure with hypoxia [J96.01]  Yes    Anemia due to chemotherapy [D64.81, T45.1X5A]  Yes    NICM (nonischemic cardiomyopathy) [I42.8]  Yes    Type 2 diabetes mellitus with diabetic polyneuropathy, with long-term current use of insulin [E11.42, Z79.4]  Not Applicable    Medically noncompliant [Z91.199]  Not Applicable    CML (chronic myelocytic leukemia) [C92.10]  Yes      Resolved Hospital Problems   No resolved problems to display.     Multifocal pneumonia  Continue cefepime and azithromycin.  Tested negative for MRSA vancomycin discontinued.  Pharmacy to dose cefepime  Blood cultures, respiratory viral panel negative till date.     Acute on chronic diastolic and HFrEF exacerbation:  Echo from 3/10/2024 showed EF of 44% with moderately elevated right ventricular systolic pressure.  Currently on Lasix 40 mg IV twice daily.  Cardiology consulted and uptitrating GDMT.     CML  WBC 35.9>29.8> 33.43  On chemo  Consult hematology/oncology  Repeat CBC     Anemia  H&H 6.5 on presentation.  Currently at 7.5.  Status post 1 unit RBC  Consult hematology/oncology  Repeat H&H stable. Daily monitoring.        Diabetes type  no edema, tenderness or deformity.   Lymphadenopathy:     She has no cervical adenopathy.   Neurological: She is alert and oriented to person, place, and time. She displays normal reflexes. No cranial nerve deficit or sensory deficit. Coordination normal.   Skin: Skin is warm and dry. No bruising, no lesion and no rash noted.   Psychiatric: She has a normal mood and affect. Her behavior is normal.   Nursing note and vitals reviewed.      Assessment & Plan:    Miya was seen today for annual exam.    Diagnoses and all orders for this visit:    Routine general medical examination at a health care facility  -     CBC & AUTO DIFFERENTIAL  -     COMPREHENSIVE METABOLIC PANEL  -     LIPID PANEL WITH REFLEX  -     THYROID STIMULATING HORMONE    Cervical cancer screening  -     THINPREP PAP TEST WITH HPV REFLEX    Thyroiditis, subacute  -     FREE T4  -     T3 TOTAL  -     US THYROID; Future    Thyroiditis        Age appropriate screening tests and immunizations were reviewed with patient.  Cont regular exercise, annual mammograms, paps Q2 years,        2  Hyperglycemia  Resume home insulin regimen  Add sliding scale insulin  hemoglobin A1c 9.70      DVT prophylaxis:  Medical and mechanical DVT prophylaxis orders are present.         Code status is   Code Status and Medical Interventions:   Ordered at: 03/19/24 2044     Code Status (Patient has no pulse and is not breathing):    CPR (Attempt to Resuscitate)     Medical Interventions (Patient has pulse or is breathing):    Full Support       Plan for disposition: Likely to DC home in couple of days.    Time: 30 minutes    Signature: Electronically signed by Gilda Morgan MD, 03/21/24, 14:24 EDT.  [facility] Hospitalist Team

## 2024-03-21 NOTE — PROGRESS NOTES
Referring Provider: Gilda Morgan MD    Reason for follow-up: CHF     Patient Care Team:  Provider, No Known as PCP - Meghann Fox MD as Consulting Physician (Hematology and Oncology)  Hamida Feldman MD as Consulting Physician (Cardiology)  Jane Hopper MD as Consulting Physician (Interventional Cardiology)      SUBJECTIVE    Feeling a little better today, shortness of breath is improving     ROS  Review of all systems negative except as indicated.    Since I have last seen, the patient has been without any chest discomfort, shortness of breath, palpitations, dizziness or syncope.  Denies having any headache, abdominal pain, nausea, vomiting, diarrhea, constipation, loss of weight or loss of appetite.  Denies having any excessive bruising, hematuria or blood in the stool.  ROS      Personal History:    Past Medical History:   Diagnosis Date    Acute respiratory failure with hypoxia 07/28/2022    Adverse effect of chemotherapy 11/08/2023    Bilateral subdural hematomas 05/18/2023    Bone pain     Chronic constipation 07/07/2023    CML (chronic myelocytic leukemia) 04/01/2018    COVID-19 virus infection 01/12/2022    Diabetes mellitus     Diabetic gastroparesis 07/07/2023    Extremity pain     Carlin. legs pain    Fall during current hospitalization 06/25/2023    Leg pain     left leg greater    Medically noncompliant 03/11/2021    Migraine     Petechial rash 11/08/2023    Pubic ramus fracture, left, closed, initial encounter 06/25/2023    Pulmonary embolism     Traumatic subdural hemorrhage without loss of consciousness 05/17/2023    Tumor lysis syndrome 07/05/2022    UTI (urinary tract infection) 07/06/2023    Vision loss     doing surgery       Past Surgical History:   Procedure Laterality Date    BONE MARROW BIOPSY      BREAST SURGERY      BRONCHOSCOPY N/A 6/6/2022    Procedure: BRONCHOSCOPY bilateral lung washing;  Surgeon: Charlene Camara MD;  Location: [facility] ENDOSCOPY;  Service:  Pulmonary;  Laterality: N/A;  post: rule out infection vs transfusion lung injury     SECTION      CHOLECYSTECTOMY      COLONOSCOPY N/A 2023    Procedure: COLONOSCOPY;  Surgeon: Freddy Villeda MD;  Location: [facility] ENDOSCOPY;  Service: Gastroenterology;  Laterality: N/A;  poor prep    ENDOSCOPY N/A 2023    Procedure: ESOPHAGOGASTRODUODENOSCOPY with biopsy x2 areas;  Surgeon: Freddy Villeda MD;  Location: [facility] ENDOSCOPY;  Service: Gastroenterology;  Laterality: N/A;  food in stomach;abnormal duodenal mucosa    EYE SURGERY      laser surgery due  to hemmorage--- 2021-- another surgery  lt eye11/15/21    RETINAL DETACHMENT SURGERY      SPINE SURGERY      Lombardi spinal block    TUBAL ABDOMINAL LIGATION         Family History   Problem Relation Age of Onset    Diabetes Mother     Diabetes Maternal Grandmother     Heart attack Maternal Grandmother     Stroke Maternal Grandmother        Social History     Tobacco Use    Smoking status: Never    Smokeless tobacco: Never   Vaping Use    Vaping status: Never Used   Substance Use Topics    Alcohol use: No    Drug use: No        Home meds:  Prior to Admission medications    Medication Sig Start Date End Date Taking? Authorizing Provider   acetaminophen (TYLENOL) 325 MG tablet Take 2 tablets by mouth Every 4 (Four) Hours As Needed for Moderate Pain. Indications: Fever, Pain 24  Yes Meghnan Lerma MD   ALPRAZolam (Xanax) 0.5 MG tablet Take 1 tablet by mouth At Night As Needed for Anxiety. Indications: Feeling Anxious 3/8/24  Yes Denisha Gill APRN   cetirizine (zyrTEC) 10 MG tablet Take 1 tablet by mouth Daily. 23  Yes Yuniel Yancey MD   dapagliflozin Propanediol (Farxiga) 10 MG tablet Take 10 mg (1 tablet) by mouth Daily. Dx code: E11.65 23  Yes Yuniel Yancey MD   furosemide (LASIX) 40 MG tablet Take 1 tablet by mouth Daily. 22  Yes Provider, MD Melecio   gabapentin (Neurontin) 800 MG tablet Take 1  tablet by mouth 3 (Three) Times a Day. Ordered through [facility]  Indications: Diabetes with Nerve Disease 1/28/24  Yes ProviderMelecio MD   Insulin Glargine (LANTUS SOLOSTAR) 100 UNIT/ML injection pen Inject 25 Units under the skin into the appropriate area as directed Every Night. Dx code: E11.65 11/8/23  Yes Yuniel Yancey MD   Insulin Lispro, 1 Unit Dial, (HumaLOG KwikPen) 100 UNIT/ML solution pen-injector Inject 7 Units under the skin into the appropriate area as directed 3 (Three) Times a Day Before Meals. Dx code: E11.65  Patient taking differently: Inject 7 Units under the skin into the appropriate area as directed 3 (Three) Times a Day Before Meals. 11/8/23  Yes Yuniel Yancey MD   metoclopramide (Reglan) 10 MG tablet Take 1 tablet by mouth 4 (Four) Times a Day. 7/9/23  Yes Melecio Almanzar MD   mirtazapine (REMERON) 15 MG tablet TAKE 1 TABLET BY MOUTH EVERY NIGHT AT BEDTIME. INDICATIONS: MAJOR DEPRESSIVE DISORDER  Patient taking differently: Take 1 tablet by mouth every night at bedtime. 2/21/24  Yes Meghann Lerma MD   pain patient supplied pump by Intrathecal route Continuous.   Yes Melecio Almanzar MD   PONATinib HCl (Iclusig) 10 MG tablet Take 10 mg by mouth Every Other Day. Take with or without food.  Swallow whole, do not crush or chew. 3/6/24  Yes Meghann Lerma MD   tiZANidine (ZANAFLEX) 4 MG tablet Take 1 tablet by mouth Daily. Indications: Musculoskeletal Pain 5/10/23  Yes ProviderMelecio MD   Continuous Blood Gluc Sensor (FreeStyle Zahira 2 Sensor) misc Use 1 each 4 (Four) Times a Day Before Meals & at Bedtime. Dx code: E11.65  Patient not taking: Reported on 2/27/2024 11/8/23   Yuniel Yancey MD   Insulin Pen Needle (Pen Needles) 32G X 4 MM misc Use 1 each 4 (Four) Times a Day Before Meals & at Bedtime. Dx code: E11.65 11/8/23   Yuniel Yancey MD       Allergies:  Patient has no known allergies.    Scheduled Meds:azithromycin, 500 mg,  "Intravenous, Q24H  cefepime, 2,000 mg, Intravenous, Q8H  empagliflozin, 10 mg, Oral, Daily  enoxaparin, 40 mg, Subcutaneous, Daily  furosemide, 40 mg, Intravenous, Q12H  gabapentin, 800 mg, Oral, Q8H  insulin glargine, 25 Units, Subcutaneous, Nightly  insulin lispro, 2-7 Units, Subcutaneous, 4x Daily AC & at Bedtime  losartan, 25 mg, Oral, Q24H  metoprolol succinate XL, 25 mg, Oral, Q24H  sodium chloride, 10 mL, Intravenous, Q12H  vancomycin, 1,000 mg, Intravenous, Q12H      Continuous Infusions:pain,   Pharmacy to Dose Cefepime,   Pharmacy to dose vancomycin,       PRN Meds:.  acetaminophen    ALPRAZolam    senna-docusate sodium **AND** polyethylene glycol **AND** bisacodyl **AND** bisacodyl    Calcium Replacement - Follow Nurse / BPA Driven Protocol    dextrose    dextrose    glucagon (human recombinant)    ipratropium-albuterol    Magnesium Standard Dose Replacement - Follow Nurse / BPA Driven Protocol    nitroglycerin    ondansetron    Pharmacy to Dose Cefepime    Pharmacy to dose vancomycin    Phosphorus Replacement - Follow Nurse / BPA Driven Protocol    Potassium Replacement - Follow Nurse / BPA Driven Protocol    sodium chloride    sodium chloride    sodium chloride      OBJECTIVE    Vital Signs  Vitals:    03/21/24 0106 03/21/24 0419 03/21/24 0803 03/21/24 0916   BP: 111/72 121/70 118/76 120/78   BP Location: Right arm  Right arm    Patient Position: Lying Lying Lying    Pulse: 90 98 102 100   Resp: 13 11 11    Temp: 97.6 °F (36.4 °C) 97.1 °F (36.2 °C)     TempSrc: Oral Oral Oral    SpO2: 96% 96% 94%    Weight:  73.5 kg (162 lb 0.6 oz)  73.5 kg (162 lb)   Height:    160 cm (62.99\")       Flowsheet Rows      Flowsheet Row First Filed Value   Admission Height 160 cm (63\") Documented at 03/19/2024 1427   Admission Weight 62.6 kg (138 lb) Documented at 03/19/2024 1427              Intake/Output Summary (Last 24 hours) at 3/21/2024 0954  Last data filed at 3/20/2024 2139  Gross per 24 hour   Intake 480 ml "   Output --   Net 480 ml          Telemetry: Sinus rhythm    Physical Exam:  The patient is alert, oriented and in no distress.  Laying down in bed  Vital signs as noted above.  Head and neck revealed no carotid bruits or jugular venous distention.  No thyromegaly or lymphadenopathy is present  Lungs decreased breath sounds at the bases  Heart normal first and second heart sounds.  No murmur. No precordial rub is present.  No gallop is present.  Abdomen soft and nontender.  No organomegaly is present.  Extremities with good peripheral pulses without any pedal edema.  Skin warm and dry.  Musculoskeletal system is grossly normal.  CNS grossly normal.       Results Review:  I have personally reviewed the results from the time of this admission to 3/21/2024 09:54 EDT and agree with these findings:  []  Laboratory  []  Microbiology  []  Radiology  []  EKG/Telemetry   []  Cardiology/Vascular   []  Pathology  []  Old records  []  Other:    Most notable findings include:    Lab Results (last 24 hours)       Procedure Component Value Units Date/Time    POC Glucose 4x Daily Before Meals & at Bedtime [[id number]]  (Abnormal) Collected: 03/21/24 0801    Specimen: Blood Updated: 03/21/24 0802     Glucose 150 mg/dL      Comment: Serial Number: [id number]:  [id number]       Magnesium [[id number]]  (Normal) Collected: 03/21/24 0151    Specimen: Blood Updated: 03/21/24 0245     Magnesium 1.9 mg/dL     Basic Metabolic Panel [[id number]]  (Abnormal) Collected: 03/21/24 0151    Specimen: Blood Updated: 03/21/24 0245     Glucose 210 mg/dL      BUN 19 mg/dL      Creatinine 0.84 mg/dL      Sodium 141 mmol/L      Potassium 4.4 mmol/L      Chloride 105 mmol/L      CO2 25.0 mmol/L      Calcium 8.6 mg/dL      BUN/Creatinine Ratio 22.6     Anion Gap 11.0 mmol/L      eGFR 85.8 mL/min/1.73     Narrative:      GFR Normal >60  Chronic Kidney Disease <60  Kidney Failure <15      CBC (No Diff) [[id number]]  (Abnormal) Collected: 03/21/24 0151     Specimen: Blood Updated: 03/21/24 0229     WBC 33.43 10*3/mm3      RBC 2.82 10*6/mm3      Hemoglobin 7.5 g/dL      Hematocrit 25.2 %      MCV 89.4 fL      MCH 26.6 pg      MCHC 29.8 g/dL      RDW 17.2 %      RDW-SD 55.6 fl      MPV 10.4 fL      Platelets 209 10*3/mm3     POC Glucose Once [[id number]]  (Abnormal) Collected: 03/20/24 2135    Specimen: Blood Updated: 03/20/24 2136     Glucose 322 mg/dL      Comment: Serial Number: [id number]:  [id number]       Blood Culture - Blood, Arm, Right [[id number]]  (Normal) Collected: 03/19/24 1841    Specimen: Blood from Arm, Right Updated: 03/20/24 1845     Blood Culture No growth at 24 hours    Narrative:      Less than seven (7) mL's of blood was collected.  Insufficient quantity may yield false negative results.    Blood Culture - Blood, Blood, Central Line [[id number]]  (Normal) Collected: 03/19/24 1841    Specimen: Blood, Central Line Updated: 03/20/24 1845     Blood Culture No growth at 24 hours    Narrative:      Less than seven (7) mL's of blood was collected.  Insufficient quantity may yield false negative results.    POC Glucose Once [[id number]]  (Abnormal) Collected: 03/20/24 1654    Specimen: Blood Updated: 03/20/24 1656     Glucose 204 mg/dL      Comment: Serial Number: [id number]:  [id number]       MRSA Screen, PCR (Inpatient) - Swab, Nares [[id number]]  (Normal) Collected: 03/20/24 1218    Specimen: Swab from Nares Updated: 03/20/24 1650     MRSA PCR No MRSA Detected    Narrative:      The negative predictive value of this diagnostic test is high and should only be used to consider de-escalating anti-MRSA therapy. A positive result may indicate colonization with MRSA and must be correlated clinically.    High Sensitivity Troponin T 2Hr [[id number]]  (Abnormal) Collected: 03/20/24 1217    Specimen: Blood from Arm, Left Updated: 03/20/24 1326     HS Troponin T 35 ng/L      Troponin T Delta 1 ng/L     Narrative:      High Sensitive Troponin T Reference  Range:  <14.0 ng/L- Negative Female for AMI  <22.0 ng/L- Negative Male for AMI  >=14 - Abnormal Female indicating possible myocardial injury.  >=22 - Abnormal Male indicating possible myocardial injury.   Clinicians would have to utilize clinical acumen, EKG, Troponin, and serial changes to determine if it is an Acute Myocardial Infarction or myocardial injury due to an underlying chronic condition.         Calcium, Ionized [[id number]]  (Abnormal) Collected: 03/20/24 1217    Specimen: Blood from Arm, Left Updated: 03/20/24 1316     Ionized Calcium 1.10 mmol/L     POC Glucose 4x Daily Before Meals & at Bedtime [[id number]]  (Abnormal) Collected: 03/20/24 1156    Specimen: Blood Updated: 03/20/24 1158     Glucose 227 mg/dL      Comment: Serial Number: [id number]:  [id number]       Hemoglobin A1c [[id number]]  (Abnormal) Collected: 03/20/24 0332    Specimen: Blood Updated: 03/20/24 1006     Hemoglobin A1C 9.70 %     Lipid Panel [[id number]]  (Abnormal) Collected: 03/19/24 1521    Specimen: Blood Updated: 03/20/24 1006     Total Cholesterol 181 mg/dL      Triglycerides 178 mg/dL      HDL Cholesterol 55 mg/dL      LDL Cholesterol  96 mg/dL      VLDL Cholesterol 30 mg/dL      LDL/HDL Ratio 1.64    Narrative:      Cholesterol Reference Ranges  (U.S. Department of Health and Human Services ATP III Classifications)    Desirable          <200 mg/dL  Borderline High    200-239 mg/dL  High Risk          >240 mg/dL      Triglyceride Reference Ranges  (U.S. Department of Health and Human Services ATP III Classifications)    Normal           <150 mg/dL  Borderline High  150-199 mg/dL  High             200-499 mg/dL  Very High        >500 mg/dL    HDL Reference Ranges  (U.S. Department of Health and Human Services ATP III Classifications)    Low     <40 mg/dl (major risk factor for CHD)  High    >60 mg/dl ('negative' risk factor for CHD)        LDL Reference Ranges  (U.S. Department of Health and Human Services ATP III  Classifications)    Optimal          <100 mg/dL  Near Optimal     100-129 mg/dL  Borderline High  130-159 mg/dL  High             160-189 mg/dL  Very High        >189 mg/dL            Imaging Results (Last 24 Hours)       ** No results found for the last 24 hours. **            LAB RESULTS (LAST 7 DAYS)    CBC  Results from last 7 days   Lab Units 03/21/24  0151 03/20/24  0332 03/19/24  1841 03/19/24  1521 03/19/24  1121   WBC 10*3/mm3 33.43* 29.28*  --  35.96* 31.94*   RBC 10*6/mm3 2.82* 2.69*  --  2.99* 2.50*   HEMOGLOBIN g/dL 7.5* 7.3* 7.8* 7.9* 6.5*   HEMATOCRIT % 25.2* 23.9* 25.6* 27.6* 22.2*   MCV fL 89.4 88.8  --  92.3 88.8   PLATELETS 10*3/mm3 209 182  --  209 188       BMP  Results from last 7 days   Lab Units 03/21/24  0151 03/20/24  0851 03/19/24  1521   SODIUM mmol/L 141 134* 133*   POTASSIUM mmol/L 4.4 4.3 5.1   CHLORIDE mmol/L 105 101 100   CO2 mmol/L 25.0 23.0 21.0*   BUN mg/dL 19 18 17   CREATININE mg/dL 0.84 0.67 0.84   GLUCOSE mg/dL 210* 217* 451*   MAGNESIUM mg/dL 1.9  --   --        CMP   Results from last 7 days   Lab Units 03/21/24  0151 03/20/24  0851 03/19/24  1521   SODIUM mmol/L 141 134* 133*   POTASSIUM mmol/L 4.4 4.3 5.1   CHLORIDE mmol/L 105 101 100   CO2 mmol/L 25.0 23.0 21.0*   BUN mg/dL 19 18 17   CREATININE mg/dL 0.84 0.67 0.84   GLUCOSE mg/dL 210* 217* 451*   ALBUMIN g/dL  --   --  3.6   BILIRUBIN mg/dL  --   --  1.9*   ALK PHOS U/L  --   --  607*   AST (SGOT) U/L  --   --  20   ALT (SGPT) U/L  --   --  17       BNP        TROPONIN  Results from last 7 days   Lab Units 03/20/24  1217   HSTROP T ng/L 35*       CoAg        Creatinine Clearance  Estimated Creatinine Clearance: 78.6 mL/min (by C-G formula based on SCr of 0.84 mg/dL).    ABG        Radiology  CT Angiogram Chest Pulmonary Embolism    Result Date: 3/19/2024  Impression: 1. No acute pulmonary embolic disease. 2. Volume loss particular right side. The right hemidiaphragm is markedly elevated. This could be secondary to  diaphragmatic paralysis. 3. Extensive right upper lobe and to a lesser degree right lower lobe pneumonia. There is also abnormal groundglass density and interstitial thickening. I would favor multifocal pneumonia possibly from an atypical infection. 4. Trace right pleural effusion. 5. Hepatosplenomegaly. Electronically Signed: Austen Sage MD  3/19/2024 6:02 PM EDT  Workstation ID: [id number]    XR Chest 1 View    Result Date: 3/19/2024  Impression: 1. Low lung volumes. 2. Cardiomegaly. 3. Bilateral airspace disease which could be due to multifocal pneumonia or pulmonary edema. Electronically Signed: Austen Sage MD  3/19/2024 3:49 PM EDT  Workstation ID: [id number]       EKG  I personally viewed and interpreted the patient's EKG/Telemetry data:  ECG 12 Lead   ED Interpretation   Micaela Winkler PA     3/20/2024  1:24 AM   ECG 12 Lead         Date/Time: 3/19/2024 3:45 PM      Performed by: Micaela Winkler PA   Authorized by: Chandler Ibarra MD  Interpreted by ED physician   Previous ECG: no previous ECG available   Rhythm: sinus tachycardia   Rate: tachycardic   BPM: 117   QRS axis: normal   Conduction: conduction normal   Clinical impression: non-specific ECG      ECG 12 Lead Dyspnea   Final Result   HEART RATE= 117  bpm   RR Interval= 512  ms   OK Interval= 153  ms   P Horizontal Axis= 28  deg   P Front Axis= 48  deg   QRSD Interval= 90  ms   QT Interval= 351  ms   QTcB= 491  ms   QRS Axis= 95  deg   T Wave Axis= 1  deg   - ABNORMAL ECG -   Sinus tachycardia   Low voltage, precordial leads   Abnormal T, consider ischemia, anterior leads   Electronically Signed By: Chandler Ibarra ([facility]) 20-Mar-2024 06:06:00   Date and Time of Study: 2024-03-19 15:13:36            Echocardiogram:    Results for orders placed during the hospital encounter of 03/10/23    Adult Transthoracic Echo Complete W/ Cont if Necessary Per Protocol    Interpretation Summary    Left ventricular systolic function is mildly decreased. Calculated left  ventricular EF = 44%    The left ventricular cavity is moderately dilated.    Left ventricular diastolic function was indeterminate.    Severe tricuspid valve regurgitation is present.    Estimated right ventricular systolic pressure from tricuspid regurgitation is moderately elevated (45-55 mmHg).    The inferior vena cava is dilated. IVC inspiratory collapse is absent.        Stress Test:  Results for orders placed during the hospital encounter of 11/03/22    Stress Test With Myocardial Perfusion One Day    Interpretation Summary    Left ventricular ejection fraction is normal (Calculated EF = 50%).    Myocardial perfusion imaging indicates a normal myocardial perfusion study with no evidence of ischemia.    Findings consistent with a normal ECG stress test.         Cardiac Catheterization:  No results found for this or any previous visit.         Other:         ASSESSMENT & PLAN:    Principal Problem:    Multifocal pneumonia  Active Problems:    CML (chronic myelocytic leukemia)    Medically noncompliant    Type 2 diabetes mellitus with diabetic polyneuropathy, with long-term current use of insulin    Acute respiratory failure with hypoxia    NICM (nonischemic cardiomyopathy)    Bilateral lower extremity edema    Anemia due to chemotherapy    Acute on chronic HFrEF (heart failure with reduced ejection fraction)      Acute on chronic HFrEF (heart failure with reduced ejection fraction) secondary to NICM (nonischemic cardiomyopathy)  Bilateral lower extremity edema  Previous EF 26-30% in Nov. 2022, improved to 44% in Mar. 2023  Patient has been noncompliant with medications and outpatient follow up  High sensitivity troponin 29, proBNP 12,315 on admission  Echocardiogram pending  Diuresis with Lasix 40 mg IV q12h  Continue Toprol XL 25 mg daily, Jardiance 10 mg daily, and losartan 25 mg daily  Uptitrate GDMT as tolerated  Initiate Heart Failure Pathway  Consult HF Nurse Navigator  Strict I&Os and monitor daily  weight  2 g Na diet     Multifocal pneumonia  Respiratory virus panel negative  On empiric antibiotics     Anemia due to chemotherapy  Hemoglobin was 6.5 prior to admission and patient was transfused with 1 unit PRBC  Hemoglobin improved to 7.9     Acute respiratory failure with hypoxia  Likely multifactorial, secondary to pneumonia, CHF, and anemia   Baseline:  room air  Currently on oxygen at 1 liter per nasal cannula   History of PE, but CT done this admission ruled out PE     CML (chronic myelocytic leukemia)  On chemotherapy, followed by oncology      Medically noncompliant  Compliance encouraged  / consulted   HF Nurse Navigator consulted as above     Type 2 diabetes mellitus with diabetic polyneuropathy, with long-term current use of insulin  Check A1c  On Lantus and SSI         Jane Hopper MD  03/21/24  09:55 EDT

## 2024-03-21 NOTE — HOME HEALTH
PATIENT IS CURRENT WITH [facility].  ADMITTED TO [facility] ON 3/19/24.  WE WILL CONTINUE TO FOLLOW AND RESUME CARE ONCE DISCHARGED HOME.

## 2024-03-21 NOTE — DISCHARGE PLACEMENT REQUEST
"J Luis Kowalski (48 y.o. Female)       Date of Birth   1975    Social Security Number       Address   [address] [place] [address]    Home Phone   [phone]    MRN   [id number]       Latter day   Gnosticist    Marital Status   Legally                             Admission Date   3/19/24    Admission Type   Emergency    Admitting Provider   Mi Cruz MD    Attending Provider   Gilda Morgan MD    Department, Room/Bed   [facility] 3C MEDICAL INPATIENT, 381/1       Discharge Date       Discharge Disposition       Discharge Destination                                 Attending Provider: Gilda Morgan MD    Allergies: No Known Allergies    Isolation: Contact   Infection: ESBL E coli (02/15/23)   Code Status: CPR    Ht: 160 cm (63\")   Wt: 73.5 kg (162 lb)    Admission Cmt: None   Principal Problem: Multifocal pneumonia [J18.9]                   Active Insurance as of 3/19/2024       Primary Coverage       Payor Plan Insurance Group Employer/Plan Group    ANTHEM MEDICARE REPLACEMENT ANTHEM MEDICARE ADVANTAGE [id number]       Payor Plan Address Payor Plan Phone Number Payor Plan Fax Number Effective Dates    [address] [phone]  1/1/2024 - None Entered    [facility] [id number]         Subscriber Name Subscriber Birth Date Member ID       J LUIS KOWALSKI 1975 [id number]               Secondary Coverage       Payor Plan Insurance Group Employer/Plan Group    [place]        Payor Plan Address Payor Plan Phone Number Payor Plan Fax Number Effective Dates    [address]   6/1/2023 - None Entered    [place] [address]         Subscriber Name Subscriber Birth Date Member ID       J LUIS KOWALSKI 1975 [id number]                     Emergency Contacts        (Rel.) Home Phone Work Phone Mobile Phone    MEGHANANEREIDA (Spouse) [phone] -- [phone]    Givings,Dynasty (Daughter) -- -- [phone]    Primitivo Kowalski " (Son) -- -- [phone]

## 2024-03-22 LAB
ABO GROUP BLD: NORMAL
ANION GAP SERPL CALCULATED.3IONS-SCNC: 9 MMOL/L (ref 5–15)
BLD GP AB SCN SERPL QL: NEGATIVE
BUN SERPL-MCNC: 13 MG/DL (ref 6–20)
BUN/CREAT SERPL: 20 (ref 7–25)
CALCIUM SPEC-SCNC: 8.3 MG/DL (ref 8.6–10.5)
CHLORIDE SERPL-SCNC: 105 MMOL/L (ref 98–107)
CO2 SERPL-SCNC: 26 MMOL/L (ref 22–29)
CREAT SERPL-MCNC: 0.65 MG/DL (ref 0.57–1)
DEPRECATED RDW RBC AUTO: 57.9 FL (ref 37–54)
EGFRCR SERPLBLD CKD-EPI 2021: 108.8 ML/MIN/1.73
ERYTHROCYTE [DISTWIDTH] IN BLOOD BY AUTOMATED COUNT: 17.5 % (ref 12.3–15.4)
GLUCOSE BLDC GLUCOMTR-MCNC: 197 MG/DL (ref 70–105)
GLUCOSE BLDC GLUCOMTR-MCNC: 251 MG/DL (ref 70–105)
GLUCOSE BLDC GLUCOMTR-MCNC: 257 MG/DL (ref 70–105)
GLUCOSE BLDC GLUCOMTR-MCNC: 305 MG/DL (ref 70–105)
GLUCOSE SERPL-MCNC: 291 MG/DL (ref 65–99)
HCT VFR BLD AUTO: 24.2 % (ref 34–46.6)
HGB BLD-MCNC: 7.1 G/DL (ref 12–15.9)
LAB AP CASE REPORT: NORMAL
MAGNESIUM SERPL-MCNC: 1.7 MG/DL (ref 1.6–2.6)
MCH RBC QN AUTO: 26.5 PG (ref 26.6–33)
MCHC RBC AUTO-ENTMCNC: 29.3 G/DL (ref 31.5–35.7)
MCV RBC AUTO: 90.3 FL (ref 79–97)
PATH REPORT.FINAL DX SPEC: NORMAL
PLATELET # BLD AUTO: 165 10*3/MM3 (ref 140–450)
PMV BLD AUTO: 10.2 FL (ref 6–12)
POTASSIUM SERPL-SCNC: 4.9 MMOL/L (ref 3.5–5.2)
RBC # BLD AUTO: 2.68 10*6/MM3 (ref 3.77–5.28)
RH BLD: POSITIVE
SODIUM SERPL-SCNC: 140 MMOL/L (ref 136–145)
T&S EXPIRATION DATE: NORMAL
TRANSFUSION REACTION INTERPRETATION 1: NORMAL
WBC NRBC COR # BLD AUTO: 22.71 10*3/MM3 (ref 3.4–10.8)

## 2024-03-22 PROCEDURE — 86902 BLOOD TYPE ANTIGEN DONOR EA: CPT

## 2024-03-22 PROCEDURE — 86900 BLOOD TYPING SEROLOGIC ABO: CPT

## 2024-03-22 PROCEDURE — 63710000001 INSULIN LISPRO (HUMAN) PER 5 UNITS: Performed by: STUDENT IN AN ORGANIZED HEALTH CARE EDUCATION/TRAINING PROGRAM

## 2024-03-22 PROCEDURE — 80048 BASIC METABOLIC PNL TOTAL CA: CPT | Performed by: NURSE PRACTITIONER

## 2024-03-22 PROCEDURE — 83735 ASSAY OF MAGNESIUM: CPT | Performed by: NURSE PRACTITIONER

## 2024-03-22 PROCEDURE — 36430 TRANSFUSION BLD/BLD COMPNT: CPT

## 2024-03-22 PROCEDURE — 86901 BLOOD TYPING SEROLOGIC RH(D): CPT | Performed by: STUDENT IN AN ORGANIZED HEALTH CARE EDUCATION/TRAINING PROGRAM

## 2024-03-22 PROCEDURE — 63710000001 INSULIN LISPRO (HUMAN) PER 5 UNITS: Performed by: HOSPITALIST

## 2024-03-22 PROCEDURE — 99232 SBSQ HOSP IP/OBS MODERATE 35: CPT | Performed by: INTERNAL MEDICINE

## 2024-03-22 PROCEDURE — 93005 ELECTROCARDIOGRAM TRACING: CPT | Performed by: STUDENT IN AN ORGANIZED HEALTH CARE EDUCATION/TRAINING PROGRAM

## 2024-03-22 PROCEDURE — 85027 COMPLETE CBC AUTOMATED: CPT | Performed by: NURSE PRACTITIONER

## 2024-03-22 PROCEDURE — 86900 BLOOD TYPING SEROLOGIC ABO: CPT | Performed by: STUDENT IN AN ORGANIZED HEALTH CARE EDUCATION/TRAINING PROGRAM

## 2024-03-22 PROCEDURE — 25010000002 ENOXAPARIN PER 10 MG: Performed by: HOSPITALIST

## 2024-03-22 PROCEDURE — 86880 COOMBS TEST DIRECT: CPT

## 2024-03-22 PROCEDURE — 82948 REAGENT STRIP/BLOOD GLUCOSE: CPT

## 2024-03-22 PROCEDURE — P9040 RBC LEUKOREDUCED IRRADIATED: HCPCS

## 2024-03-22 PROCEDURE — 86901 BLOOD TYPING SEROLOGIC RH(D): CPT

## 2024-03-22 PROCEDURE — 25010000002 DIPHENHYDRAMINE PER 50 MG: Performed by: STUDENT IN AN ORGANIZED HEALTH CARE EDUCATION/TRAINING PROGRAM

## 2024-03-22 PROCEDURE — 25010000002 METHYLPREDNISOLONE PER 40 MG: Performed by: STUDENT IN AN ORGANIZED HEALTH CARE EDUCATION/TRAINING PROGRAM

## 2024-03-22 PROCEDURE — 82948 REAGENT STRIP/BLOOD GLUCOSE: CPT | Performed by: HOSPITALIST

## 2024-03-22 PROCEDURE — 93010 ELECTROCARDIOGRAM REPORT: CPT | Performed by: INTERNAL MEDICINE

## 2024-03-22 PROCEDURE — 86850 RBC ANTIBODY SCREEN: CPT | Performed by: STUDENT IN AN ORGANIZED HEALTH CARE EDUCATION/TRAINING PROGRAM

## 2024-03-22 PROCEDURE — 86922 COMPATIBILITY TEST ANTIGLOB: CPT

## 2024-03-22 PROCEDURE — 63710000001 INSULIN GLARGINE PER 5 UNITS: Performed by: HOSPITALIST

## 2024-03-22 PROCEDURE — 25010000002 CEFEPIME PER 500 MG: Performed by: STUDENT IN AN ORGANIZED HEALTH CARE EDUCATION/TRAINING PROGRAM

## 2024-03-22 PROCEDURE — 97162 PT EVAL MOD COMPLEX 30 MIN: CPT

## 2024-03-22 RX ORDER — DIPHENHYDRAMINE HYDROCHLORIDE 50 MG/ML
25 INJECTION INTRAMUSCULAR; INTRAVENOUS ONCE
Status: COMPLETED | OUTPATIENT
Start: 2024-03-22 | End: 2024-03-22

## 2024-03-22 RX ORDER — DILTIAZEM HYDROCHLORIDE 5 MG/ML
10 INJECTION INTRAVENOUS ONCE
Status: COMPLETED | OUTPATIENT
Start: 2024-03-22 | End: 2024-03-22

## 2024-03-22 RX ORDER — METHYLPREDNISOLONE SODIUM SUCCINATE 40 MG/ML
40 INJECTION, POWDER, LYOPHILIZED, FOR SOLUTION INTRAMUSCULAR; INTRAVENOUS ONCE
Status: COMPLETED | OUTPATIENT
Start: 2024-03-22 | End: 2024-03-22

## 2024-03-22 RX ORDER — INSULIN LISPRO 100 [IU]/ML
3-14 INJECTION, SOLUTION INTRAVENOUS; SUBCUTANEOUS
Status: DISCONTINUED | OUTPATIENT
Start: 2024-03-22 | End: 2024-03-25 | Stop reason: HOSPADM

## 2024-03-22 RX ORDER — METOPROLOL TARTRATE 1 MG/ML
5 INJECTION, SOLUTION INTRAVENOUS EVERY 6 HOURS PRN
Status: DISCONTINUED | OUTPATIENT
Start: 2024-03-22 | End: 2024-03-25 | Stop reason: HOSPADM

## 2024-03-22 RX ORDER — AMOXICILLIN AND CLAVULANATE POTASSIUM 875; 125 MG/1; MG/1
1 TABLET, FILM COATED ORAL EVERY 12 HOURS SCHEDULED
Status: DISCONTINUED | OUTPATIENT
Start: 2024-03-22 | End: 2024-03-25 | Stop reason: HOSPADM

## 2024-03-22 RX ORDER — METOPROLOL SUCCINATE 25 MG/1
25 TABLET, EXTENDED RELEASE ORAL EVERY 12 HOURS SCHEDULED
Status: DISCONTINUED | OUTPATIENT
Start: 2024-03-22 | End: 2024-03-25 | Stop reason: HOSPADM

## 2024-03-22 RX ADMIN — INSULIN LISPRO 10 UNITS: 100 INJECTION, SOLUTION INTRAVENOUS; SUBCUTANEOUS at 20:55

## 2024-03-22 RX ADMIN — EMPAGLIFLOZIN 10 MG: 10 TABLET, FILM COATED ORAL at 08:57

## 2024-03-22 RX ADMIN — DILTIAZEM HYDROCHLORIDE 10 MG: 5 INJECTION, SOLUTION INTRAVENOUS at 01:41

## 2024-03-22 RX ADMIN — METHYLPREDNISOLONE SODIUM SUCCINATE 40 MG: 40 INJECTION, POWDER, FOR SOLUTION INTRAMUSCULAR; INTRAVENOUS at 20:54

## 2024-03-22 RX ADMIN — INSULIN LISPRO 4 UNITS: 100 INJECTION, SOLUTION INTRAVENOUS; SUBCUTANEOUS at 11:55

## 2024-03-22 RX ADMIN — Medication 10 ML: at 08:57

## 2024-03-22 RX ADMIN — DIPHENHYDRAMINE HYDROCHLORIDE 25 MG: 50 INJECTION, SOLUTION INTRAMUSCULAR; INTRAVENOUS at 20:59

## 2024-03-22 RX ADMIN — GABAPENTIN 800 MG: 400 CAPSULE ORAL at 05:35

## 2024-03-22 RX ADMIN — ENOXAPARIN SODIUM 40 MG: 100 INJECTION SUBCUTANEOUS at 16:52

## 2024-03-22 RX ADMIN — CEFEPIME 2000 MG: 2 INJECTION, POWDER, FOR SOLUTION INTRAVENOUS at 05:36

## 2024-03-22 RX ADMIN — GABAPENTIN 800 MG: 400 CAPSULE ORAL at 16:52

## 2024-03-22 RX ADMIN — ACETAMINOPHEN 650 MG: 325 TABLET, FILM COATED ORAL at 20:54

## 2024-03-22 RX ADMIN — INSULIN LISPRO 3 UNITS: 100 INJECTION, SOLUTION INTRAVENOUS; SUBCUTANEOUS at 16:52

## 2024-03-22 RX ADMIN — METOPROLOL SUCCINATE 25 MG: 25 TABLET, EXTENDED RELEASE ORAL at 19:31

## 2024-03-22 RX ADMIN — ALPRAZOLAM 0.5 MG: 0.5 TABLET ORAL at 20:54

## 2024-03-22 RX ADMIN — METOPROLOL TARTRATE 5 MG: 1 INJECTION, SOLUTION INTRAVENOUS at 16:59

## 2024-03-22 RX ADMIN — FUROSEMIDE 40 MG: 40 TABLET ORAL at 08:57

## 2024-03-22 RX ADMIN — INSULIN LISPRO 4 UNITS: 100 INJECTION, SOLUTION INTRAVENOUS; SUBCUTANEOUS at 08:57

## 2024-03-22 RX ADMIN — AMOXICILLIN AND CLAVULANATE POTASSIUM 1 TABLET: 875; 125 TABLET, FILM COATED ORAL at 20:54

## 2024-03-22 RX ADMIN — GABAPENTIN 800 MG: 400 CAPSULE ORAL at 20:54

## 2024-03-22 RX ADMIN — INSULIN GLARGINE 25 UNITS: 100 INJECTION, SOLUTION SUBCUTANEOUS at 20:54

## 2024-03-22 RX ADMIN — Medication 10 ML: at 20:55

## 2024-03-22 RX ADMIN — LOSARTAN POTASSIUM 25 MG: 25 TABLET, FILM COATED ORAL at 08:57

## 2024-03-22 NOTE — PLAN OF CARE
Goal Outcome Evaluation:              Outcome Evaluation: Pt slept well during the night.  HR was elevated in the 160s now in the 90s. See notes.  PT remained on IV antibiotics. Pt stable at this time.

## 2024-03-22 NOTE — PROGRESS NOTES
Hematology/Oncology Inpatient Progress Note    PATIENT NAME: Nieves Mc  : 1975  MRN: [id number]    CHIEF COMPLAINT: Hypoxia    HISTORY OF PRESENT ILLNESS:      Nieves Mc is a 48 y.o. female with past medical history of CML on treatment, diabetes, pulmonary embolism on anticoagulation and combined CHF, who presented to [facility] on 3/19/2024 with complaints of as of breath.  Patient was sent to ambulatory care due to hemoglobin of 6.5 g/dL.  Patient's O2 saturation was 85% on room air while at ambulatory care.  She is also noted to be tachypneic.  Patient was sent to the ED for further evaluation.  Per patient, she has had shortness of breath for the past 2 to 3 days.  Denies fever or chills.  She also reports new cough and chest tightness.  Denies hemoptysis, abdominal pain.  She is also noted lower extremity swelling.     In ED, patient was tachycardic.  Labs remarkable for WBC 35.96, hemoglobin 0.9 g/dL, D-dimer 1.23, blood glucose 451, BNP 12,315, troponin 29.  Chest x-ray with low lung volumes and cardiomegaly.  Bilateral airspace disease which could be due to multifocal pneumonia or pulmonary edema.     CT angiogram chest:  Impression:  1. No acute pulmonary embolic disease.  2. Volume loss particular right side. The right hemidiaphragm is markedly elevated. This could be secondary to diaphragmatic paralysis.  3. Extensive right upper lobe and to a lesser degree right lower lobe pneumonia. There is also abnormal groundglass density and interstitial thickening. I would favor multifocal pneumonia possibly from an atypical infection.  4. Trace right pleural effusion.  5. Hepatosplenomegaly.     24  Hematology/Oncology was consulted. She is established with Dr. Lerma for CML. She recently switched treatments from dasatnib to ponatinib. She reports she started on Monday 3/18/24 (dosing is 10mg every other day).      Diagnosis of CML for many years noncompliant with medicines.   Patient was receiving blood transfusion and was noted to be hypoxic she was then sent to the emergency room for further evaluation.  In the emergency room she had a CT scan of the chest which showed no acute PE but she had extensive right upper lobe right lower lobe pneumonia with groundglass opacification.  Patient was currently placed on ponatinib.  She has been admitted to the hospital for IV antibiotics, respiratory care.     He/She  has a past medical history of Acute respiratory failure with hypoxia (07/28/2022), Adverse effect of chemotherapy (11/08/2023), Bilateral subdural hematomas (05/18/2023), Bone pain, Chronic constipation (07/07/2023), CML (chronic myelocytic leukemia) (04/01/2018), COVID-19 virus infection (01/12/2022), Diabetes mellitus, Diabetic gastroparesis (07/07/2023), Extremity pain, Fall during current hospitalization (06/25/2023), Leg pain, Medically noncompliant (03/11/2021), Migraine, Petechial rash (11/08/2023), Pubic ramus fracture, left, closed, initial encounter (06/25/2023), Pulmonary embolism, Traumatic subdural hemorrhage without loss of consciousness (05/17/2023), 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     PCP: Provider, No Known  Subjective     ROS:  Review of Systems   Constitutional:  Positive for fatigue.   Respiratory:  Positive for cough and shortness of breath.    Neurological:  Positive for weakness.        MEDICATIONS:    Scheduled Meds:  amoxicillin-clavulanate, 1 tablet, Oral, Q12H  diphenhydrAMINE, 25 mg, Intravenous, Once  empagliflozin, 10 mg, Oral, Daily  enoxaparin, 40 mg, Subcutaneous, Daily  furosemide, 40 mg, Oral, Daily  gabapentin, 800 mg, Oral, Q8H  insulin glargine, 25 Units, Subcutaneous, Nightly  insulin lispro, 3-14 Units, Subcutaneous, 4x Daily AC & at Bedtime  losartan, 25 mg, Oral, Q24H  methylPREDNISolone sodium succinate, 40 mg, Intravenous, Once  metoprolol succinate XL, 25 mg, Oral,  "Q12H  sodium chloride, 10 mL, Intravenous, Q12H       Continuous Infusions:  pain,        PRN Meds:    acetaminophen    ALPRAZolam    senna-docusate sodium **AND** polyethylene glycol **AND** bisacodyl **AND** bisacodyl    Calcium Replacement - Follow Nurse / BPA Driven Protocol    dextrose    dextrose    glucagon (human recombinant)    ipratropium-albuterol    Magnesium Standard Dose Replacement - Follow Nurse / BPA Driven Protocol    metoprolol tartrate    nitroglycerin    ondansetron    Phosphorus Replacement - Follow Nurse / BPA Driven Protocol    Potassium Replacement - Follow Nurse / BPA Driven Protocol    sodium chloride    sodium chloride    sodium chloride     ALLERGIES:  No Known Allergies    Objective    VITALS:   /91 (BP Location: Right arm, Patient Position: Lying)   Pulse (!) 156   Temp 97.9 °F (36.6 °C) (Oral)   Resp 16   Ht 160 cm (63\")   Wt 75.2 kg (165 lb 12.6 oz)   LMP 05/25/2022 (Approximate)   SpO2 93%   BMI 29.37 kg/m²     PHYSICAL EXAM: (performed by MD)  Physical Exam  Vitals and nursing note reviewed.   Constitutional:       General: She is not in acute distress.     Appearance: She is not diaphoretic.   HENT:      Head: Normocephalic and atraumatic.   Eyes:      General: No scleral icterus.        Right eye: No discharge.         Left eye: No discharge.      Conjunctiva/sclera: Conjunctivae normal.   Neck:      Thyroid: No thyromegaly.   Cardiovascular:      Rate and Rhythm: Normal rate and regular rhythm.      Heart sounds: Normal heart sounds.      No friction rub. No gallop.   Pulmonary:      Effort: Pulmonary effort is normal. No respiratory distress.      Breath sounds: No stridor. Rhonchi present. No wheezing.   Abdominal:      General: Bowel sounds are normal.      Palpations: Abdomen is soft. There is no mass.      Tenderness: There is no abdominal tenderness. There is no guarding or rebound.   Musculoskeletal:         General: No tenderness. Normal range of motion.    "   Cervical back: Normal range of motion and neck supple.   Lymphadenopathy:      Cervical: No cervical adenopathy.   Skin:     General: Skin is warm.      Findings: No erythema or rash.   Neurological:      Mental Status: She is alert and oriented to person, place, and time.      Motor: No abnormal muscle tone.   Psychiatric:         Behavior: Behavior normal.           RECENT LABS:  Lab Results (last 24 hours)       Procedure Component Value Units Date/Time    POC Glucose Once [[id number]]  (Abnormal) Collected: 03/22/24 1556    Specimen: Blood Updated: 03/22/24 1558     Glucose 197 mg/dL      Comment: Serial Number: [id number]:  [id number]       Pathology Consultation [[id number]] Collected: 03/19/24 1615    Specimen: Blood Product Bag Updated: 03/22/24 1546     Final Diagnosis --     Nonfebrile nonhemolytic transfusion event with reported dyspnea and tachycardia following administration of an entire unit of packed red blood cells given for symptomatic anemia.  It is noted that the patient's respiratory rate did not increase during or after the reaction and the oxygen saturation actually increased going from 91% up to 97% post reaction.  It is somewhat unclear whether the transfusion itself could have because these symptoms as the patient's clinical condition includes CML with multifocal pneumonia and acute respiratory failure/hypoxia.  The patient's symptoms subsided and she returned back to baseline status later.  The symptoms are therefore most likely due to the patient's clinical condition.  Would recommend, however, that for future transfusions to use a slow infusion rate and close monitoring.    JANNY       Case Report --     Surgical Pathology Report                         Case: [id number]                                  Authorizing Provider:  Micaela Winkler PA        Collected:           03/19/2024 04:15 PM          Ordering Location:     [facility]       Received:            03/22/2024  10:52 AM                                 EMERGENCY DEPARTMENT                                                         Pathologist:           Amador Hodges MD                                                           Specimen:    Blood Product Bag                                                                          POC Glucose 4x Daily Before Meals & at Bedtime [[id number]]  (Abnormal) Collected: 03/22/24 1138    Specimen: Blood Updated: 03/22/24 1140     Glucose 251 mg/dL      Comment: Serial Number: [id number]:  [id number]       POC Glucose 4x Daily Before Meals & at Bedtime [[id number]]  (Abnormal) Collected: 03/22/24 0735    Specimen: Blood Updated: 03/22/24 0736     Glucose 257 mg/dL      Comment: Serial Number: [id number]:  [id number]       Magnesium [[id number]]  (Normal) Collected: 03/22/24 0531    Specimen: Blood Updated: 03/22/24 0618     Magnesium 1.7 mg/dL     Basic Metabolic Panel [[id number]]  (Abnormal) Collected: 03/22/24 0531    Specimen: Blood Updated: 03/22/24 0618     Glucose 291 mg/dL      BUN 13 mg/dL      Creatinine 0.65 mg/dL      Sodium 140 mmol/L      Potassium 4.9 mmol/L      Chloride 105 mmol/L      CO2 26.0 mmol/L      Calcium 8.3 mg/dL      BUN/Creatinine Ratio 20.0     Anion Gap 9.0 mmol/L      eGFR 108.8 mL/min/1.73     Narrative:      GFR Normal >60  Chronic Kidney Disease <60  Kidney Failure <15      CBC (No Diff) [[id number]]  (Abnormal) Collected: 03/22/24 0531    Specimen: Blood Updated: 03/22/24 0551     WBC 22.71 10*3/mm3      RBC 2.68 10*6/mm3      Hemoglobin 7.1 g/dL      Hematocrit 24.2 %      MCV 90.3 fL      MCH 26.5 pg      MCHC 29.3 g/dL      RDW 17.5 %      RDW-SD 57.9 fl      MPV 10.2 fL      Platelets 165 10*3/mm3     POC Glucose Once [[id number]]  (Abnormal) Collected: 03/21/24 2034    Specimen: Blood Updated: 03/21/24 2035     Glucose 249 mg/dL      Comment: Serial Number: [id number]:  [id number]       Blood Culture - Blood, Arm, Right [[id number]]   (Normal) Collected: 03/19/24 1841    Specimen: Blood from Arm, Right Updated: 03/21/24 1845     Blood Culture No growth at 2 days    Narrative:      Less than seven (7) mL's of blood was collected.  Insufficient quantity may yield false negative results.    Blood Culture - Blood, Blood, Central Line [[id number]]  (Normal) Collected: 03/19/24 1841    Specimen: Blood, Central Line Updated: 03/21/24 1845     Blood Culture No growth at 2 days    Narrative:      Less than seven (7) mL's of blood was collected.  Insufficient quantity may yield false negative results.            PENDING RESULTS:     IMAGING REVIEWED:  No radiology results for the last day    Assessment & Plan     ASSESSMENT:    CML: She was initially treated with imatinib with no significant response. She was then treated with nilotinib, but discontinued due to noncompliance. Patient was on ponatinib but developed significant pancytopenia. Dasatinib (Sprycel) was initiated in October 2023, but subsequently discontinued due to poor tolerance. She was willing to retry ponatinib and this was restarted 10 mg every other day. Will continue to hold  Normocytic anemia: continue to monitor CBC and supportive transfusions as needed. Transfuse today for hgb of 7.1g/dl  Multifocal pneumonia: Blood cultures pending. Patient on Vancomycin, Cefepime, Azithromycin. Antibiotic and supportive care per primary team. Continue antibiotics  Leukocytosis: Acute increase likely secondary to multifocal pneumonia.  Acute respiratory failure with hypoxia: Likely multifactorial, secondary to pneumonia, CHF. Currently on oxygen 1L per nasal cannula. Imaging of chest with no evidence of PE.     PLANS    Okay to hold ponatinib with acute infection  Monitor CBC daily  Daily CBC  Supportive care per primary team  Discussed with patient     Electronically signed by Meghann Lerma MD, 03/22/24, 5:38 PM EDT.

## 2024-03-22 NOTE — SIGNIFICANT NOTE
03/22/24 1401   OTHER   Discipline physical therapist   Rehab Time/Intention   Session Not Performed unable to evaluate, medical status change  (Pt's HR elevated to 130s-140s when up to bathroom and unable to come back down without increased meds. Pt now resting comfortably and not appropriate for therapy at this time.)   Recommendation   PT - Next Appointment 03/23/24

## 2024-03-22 NOTE — DISCHARGE INSTR - APPOINTMENTS
Hospital follow-up appt:  Tues 3- 1pm    Well visit: 5-9-2024 3:45pm    Dr Pankaj Stover  [address]  [place] [address]

## 2024-03-22 NOTE — CASE MANAGEMENT/SOCIAL WORK
Continued Stay Note   Mt     Patient Name: Nieves Mc  MRN: [id number]  Today's Date: 3/22/2024    Admit Date: 3/19/2024    Plan: Return home with mother. Has caregiver through [employer] daily 8-4PM. [facility] (pending acceptance. For PICC care and lab draws. Will need HHC order)   Discharge Plan       Row Name 03/22/24 1622       Plan    Plan Return home with mother. Has caregiver through [employer] daily 8-4PM. [facility] (pending acceptance. For PICC care and lab draws. Will need HHC order)    Plan Comments DC barriers, IV abx, 1 unit PRBC today, AM labs tomorrow to check Hgb. Onc, DM educator, cardiology and heart failure nurse following.               Elsa Lawson RN      Office phone: [phone]  Office fax: [phone]

## 2024-03-22 NOTE — NURSING NOTE
Provider contacted per request regarding patient HR.  Patient HR was elevated 130s-140s.  One time dose of Cardizem given.  Pt HR is currently at 104.  Pt is stable and sleeping at this time.

## 2024-03-22 NOTE — PROGRESS NOTES
[facility] MEDICINE SERVICE  DAILY PROGRESS NOTE    NAME: Nieves Mc  : 1975  MRN: [id number]      LOS: 3 days     PROVIDER OF SERVICE: Gilda Morgan MD    Chief Complaint: Multifocal pneumonia    Subjective:     Interval History:  History taken from: patient  Patient Complaints: Patient reports feeling much better today.  Breathing is improving.  Oxygen requirement is down to 2 L.  Hemoglobin is dropping currently at 7.1.  Patient agreed to get blood transfusion.  She has been pretreated with prednisone and Benadryl given there is a concern that she might have had an allergic reaction when she was getting transfusion at the heme-onc clinic and had to be admitted to the hospital this admission.    Patient Denies: Chest pain, lightness, dizziness, fever rigors and chills.    Review of Systems:   All systems reviewed and negative except as mentioned above.    Objective:     Vital Signs  Temp:  [97.6 °F (36.4 °C)-97.9 °F (36.6 °C)] 97.9 °F (36.6 °C)  Heart Rate:  [] 118  Resp:  [14-20] 14  BP: (110-165)/(69-93) 137/93  Flow (L/min):  [2] 2   Body mass index is 29.37 kg/m².    Physical Exam  Physical Exam  Constitutional:       Appearance: Normal appearance.   HENT:      Head: Normocephalic and atraumatic.      Mouth/Throat:      Mouth: Mucous membranes are moist.   Eyes:      Pupils: Pupils are equal, round, and reactive to light.   Cardiovascular:      Rate and Rhythm: Normal rate and regular rhythm.   Pulmonary:      Effort: Pulmonary effort is normal.      Breath sounds: Rales present.   Abdominal:      General: Bowel sounds are normal.      Palpations: Abdomen is soft.      Tenderness: There is no abdominal tenderness.   Musculoskeletal:         General: Normal range of motion.      Cervical back: Normal range of motion.   Skin:     General: Skin is warm and dry.   Neurological:      General: No focal deficit present.      Mental Status: She is alert and oriented to person, place, and  time.   Psychiatric:         Behavior: Behavior normal.         Scheduled Meds   amoxicillin-clavulanate, 1 tablet, Oral, Q12H  diphenhydrAMINE, 25 mg, Intravenous, Once  empagliflozin, 10 mg, Oral, Daily  enoxaparin, 40 mg, Subcutaneous, Daily  furosemide, 40 mg, Oral, Daily  gabapentin, 800 mg, Oral, Q8H  insulin glargine, 25 Units, Subcutaneous, Nightly  insulin lispro, 2-7 Units, Subcutaneous, 4x Daily AC & at Bedtime  losartan, 25 mg, Oral, Q24H  methylPREDNISolone sodium succinate, 40 mg, Intravenous, Once  metoprolol succinate XL, 25 mg, Oral, Q24H  sodium chloride, 10 mL, Intravenous, Q12H       PRN Meds     acetaminophen    ALPRAZolam    senna-docusate sodium **AND** polyethylene glycol **AND** bisacodyl **AND** bisacodyl    Calcium Replacement - Follow Nurse / BPA Driven Protocol    dextrose    dextrose    glucagon (human recombinant)    ipratropium-albuterol    Magnesium Standard Dose Replacement - Follow Nurse / BPA Driven Protocol    nitroglycerin    ondansetron    Phosphorus Replacement - Follow Nurse / BPA Driven Protocol    Potassium Replacement - Follow Nurse / BPA Driven Protocol    sodium chloride    sodium chloride    sodium chloride   Infusions  pain,           Diagnostic Data    Results from last 7 days   Lab Units 03/22/24  0531 03/19/24  1841 03/19/24  1521   WBC 10*3/mm3 22.71*   < > 35.96*   HEMOGLOBIN g/dL 7.1*   < > 7.9*   HEMATOCRIT % 24.2*   < > 27.6*   PLATELETS 10*3/mm3 165   < > 209   GLUCOSE mg/dL 291*   < > 451*   CREATININE mg/dL 0.65   < > 0.84   BUN mg/dL 13   < > 17   SODIUM mmol/L 140   < > 133*   POTASSIUM mmol/L 4.9   < > 5.1   AST (SGOT) U/L  --   --  20   ALT (SGPT) U/L  --   --  17   ALK PHOS U/L  --   --  607*   BILIRUBIN mg/dL  --   --  1.9*   ANION GAP mmol/L 9.0   < > 12.0    < > = values in this interval not displayed.       No radiology results for the last day      I reviewed the patient's new clinical results.    Assessment/Plan:     Active and Resolved  Problems  Active Hospital Problems    Diagnosis  POA    **Multifocal pneumonia [J18.9]  Yes    Bilateral lower extremity edema [R60.0]  Yes    Acute on chronic HFrEF (heart failure with reduced ejection fraction) [I50.23]  Yes    Acute respiratory failure with hypoxia [J96.01]  Yes    Anemia due to chemotherapy [D64.81, T45.1X5A]  Yes    NICM (nonischemic cardiomyopathy) [I42.8]  Yes    Type 2 diabetes mellitus with diabetic polyneuropathy, with long-term current use of insulin [E11.42, Z79.4]  Not Applicable    Medically noncompliant [Z91.199]  Not Applicable    CML (chronic myelocytic leukemia) [C92.10]  Yes      Resolved Hospital Problems   No resolved problems to display.     Multifocal pneumonia  Discontinued cefepime and azithromycin.  Switched over to p.o. Augmentin.  Tested negative for MRSA vancomycin discontinued.  Blood cultures, respiratory viral panel negative till date.     Acute on chronic diastolic and HFrEF exacerbation:  Echo from 3/10/2024 showed EF of 44% with moderately elevated right ventricular systolic pressure.  Currently on Lasix 40 mg IV twice daily.  Cardiology consulted and uptitrating GDMT.     CML  WBC 35.9>29.8> 33.43>22  On chemo  Repeat CBC  Patient to follow-up with heme-onc as an outpatient.     Anemia  H&H 6.5 on presentation.  Currently at 7.1  Will transfuse second unit of PRBCs today.  Repeat H&H stable. Daily monitoring.        Diabetes type 2  Hyperglycemia  Resume home insulin regimen  Add sliding scale insulin  hemoglobin A1c 9.70      DVT prophylaxis:  Medical and mechanical DVT prophylaxis orders are present.         Code status is   Code Status and Medical Interventions:   Ordered at: 03/19/24 2044     Code Status (Patient has no pulse and is not breathing):    CPR (Attempt to Resuscitate)     Medical Interventions (Patient has pulse or is breathing):    Full Support       Plan for disposition: Likely to DC home in couple of days.    Time: 30 minutes    Signature:  Electronically signed by Gilda Morgan MD, 03/22/24, 14:30 EDT.  [facility] Hospitalist Team

## 2024-03-22 NOTE — PLAN OF CARE
Goal Outcome Evaluation:              Outcome Evaluation: Patient has slept off and on during shift. PT ordered. Will be getting 1 unit of blood       Daily Care Plan Summary: Heart Failure    Diuretic in use (IV or PO):   PO        Daily weight (up or down):    gain: 3lbs      Output > Intake (yes/no):Yes      O2 Requirements (current, any change?): room air      Symptoms noted with Activity (Respiratory Tolerance, functional state):     Anxious about oxygen       Anticipated Discharge Plans:     Pending HGB results tomorrow
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
## 2024-03-22 NOTE — NURSING NOTE
Patient noted to have an elevated HR when up to the bathroom.  Once back to bed HR sustained around the 150s.  Stat EKG was obtain see chart.  Messaged placed to on call provider to get morning dose of lopressor that was held on day shift.  Oral dose was given an IV lopressor push given.  HR came down to 130-140s.  Waiting over an hour to see if HR would come down more with oral dose.  No change in HR.  A message placed to on call provider.  Waiting on response.

## 2024-03-22 NOTE — NURSING NOTE
Provider responded and request to monitor patient at this time and to check back in two hours if HR has not improved.  Pt stable at this time.

## 2024-03-22 NOTE — THERAPY EVALUATION
Patient Name: Nieves Mc  : 1975    MRN: [id number]                              Today's Date: 3/22/2024       Admit Date: 3/19/2024    Visit Dx:     ICD-10-CM ICD-9-CM   1. Pneumonia of right lung due to infectious organism, unspecified part of lung  J18.9 483.8   2. Hypoxia  R09.02 799.02   3. Anemia, unspecified type  D64.9 285.9   4. 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    Menorrhagia    Acute respiratory failure with hypoxia    Thrombocytopenia    Hyperuricemia    NICM (nonischemic cardiomyopathy)    Dyspnea, unspecified type    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    Chronic pain    Chronic narcotic dependence    Dehydration    Vomiting    Bilateral lower extremity edema    Pancytopenia    Lumbar radiculopathy    Hypomagnesemia    Right upper quadrant abdominal pain    History of migraine    Fall during current hospitalization    Pubic ramus fracture, left, closed, initial encounter    Nausea    Frequent falls    Chronic constipation    Diabetic gastroparesis    Encounter for blood transfusion    Petechial rash    Adverse effect of chemotherapy    Hypercalcemia    PICC (peripherally inserted central catheter) flush    Multifocal pneumonia    Anemia due to chemotherapy    Acute on chronic HFrEF (heart failure with reduced ejection fraction)     Past Medical History:   Diagnosis Date    Acute respiratory failure with hypoxia 2022    Adverse effect  of chemotherapy 2023    Bilateral subdural hematomas 2023    Bone pain     Chronic constipation 2023    CML (chronic myelocytic leukemia) 2018    COVID-19 virus infection 2022    Diabetes mellitus     Diabetic gastroparesis 2023    Extremity pain     Carlin. legs pain    Fall during current hospitalization 2023    Leg pain     left leg greater    Medically noncompliant 2021    Migraine     Petechial rash 2023    Pubic ramus fracture, left, closed, initial encounter 2023    Pulmonary embolism     Traumatic subdural hemorrhage without loss of consciousness 2023    Tumor lysis syndrome 2022    UTI (urinary tract infection) 2023    Vision loss     doing surgery     Past Surgical History:   Procedure Laterality Date    BONE MARROW BIOPSY      BREAST SURGERY      BRONCHOSCOPY N/A 2022    Procedure: BRONCHOSCOPY bilateral lung washing;  Surgeon: Charlene Camara MD;  Location: [facility] ENDOSCOPY;  Service: Pulmonary;  Laterality: N/A;  post: rule out infection vs transfusion lung injury     SECTION      CHOLECYSTECTOMY      COLONOSCOPY N/A 2023    Procedure: COLONOSCOPY;  Surgeon: Freddy Villeda MD;  Location: [facility] ENDOSCOPY;  Service: Gastroenterology;  Laterality: N/A;  poor prep    ENDOSCOPY N/A 2023    Procedure: ESOPHAGOGASTRODUODENOSCOPY with biopsy x2 areas;  Surgeon: Freddy Villeda MD;  Location: [facility] ENDOSCOPY;  Service: Gastroenterology;  Laterality: N/A;  food in stomach;abnormal duodenal mucosa    EYE SURGERY      laser surgery due  to hemmorage--- 2021-- another surgery  lt eye11/15/21    RETINAL DETACHMENT SURGERY      SPINE SURGERY      Lombardi spinal block    TUBAL ABDOMINAL LIGATION        General Information       Row Name 24 1507          Physical Therapy Time and Intention    Document Type evaluation  -AO     Mode of Treatment physical therapy  -AO       Row Name 24 1507          General  Information    Patient Profile Reviewed yes  -AO     Prior Level of Function --  Independent w/ short-distance ambulatory transfers to/from wheelchair, has caregiver 8-4pm daily to assist w/ ADLs  -AO     Existing Precautions/Restrictions cardiac;fall  -AO     Barriers to Rehab previous functional deficit;medically complex  -AO       Row Name 03/22/24 1507          Living Environment    People in Home parent(s)  Mother and daughter  -AO       Row Name 03/22/24 1507          Cognition    Orientation Status (Cognition) oriented x 4  -AO       Row Name 03/22/24 1507          Safety Issues, Functional Mobility    Impairments Affecting Function (Mobility) balance;pain;shortness of breath;strength;endurance/activity tolerance;range of motion (ROM)  -AO               User Key  (r) = Recorded By, (t) = Taken By, (c) = Cosigned By      Initials Name Provider Type    Moraima Kruger, PT Physical Therapist                   Mobility       Row Name 03/22/24 1507          Bed Mobility    Bed Mobility bed mobility (all) activities  -AO     All Activities, Stanley (Bed Mobility) modified independence  -AO     Assistive Device (Bed Mobility) bed rails  -AO       Row Name 03/22/24 1507          Bed-Chair Transfer    Bed-Chair Stanley (Transfers) modified independence  -AO       Row Name 03/22/24 1507          Sit-Stand Transfer    Sit-Stand Stanley (Transfers) modified independence  -AO       Row Name 03/22/24 1507          Gait/Stairs (Locomotion)    Gait/Stairs Locomotion gait/ambulation independence  -AO     Stanley Level (Gait) modified independence  -AO     Distance in Feet (Gait) 3  -AO     Pattern (Gait) step-to  -AO     Deviations/Abnormal Patterns (Gait) left sided deviations  -AO               User Key  (r) = Recorded By, (t) = Taken By, (c) = Cosigned By      Initials Name Provider Type    Moraima Kruger, PT Physical Therapist                   Obj/Interventions       Row Name 03/22/24 1505           Range of Motion Comprehensive    General Range of Motion bilateral lower extremity ROM WFL  -AO       Row Name 03/22/24 1507          Strength Comprehensive (MMT)    Comment, General Manual Muscle Testing (MMT) Assessment L hip flexion: 2/5, all else WFL  -AO       Row Name 03/22/24 1507          Balance    Balance Assessment sitting static balance;sitting dynamic balance;sit to stand dynamic balance;standing static balance  -AO     Static Sitting Balance independent  -AO     Dynamic Sitting Balance independent  -AO     Position, Sitting Balance unsupported;sitting edge of bed  -AO     Sit to Stand Dynamic Balance modified independence  -AO     Static Standing Balance independent  -AO     Position/Device Used, Standing Balance supported  -AO       Row Name 03/22/24 1507          Sensory Assessment (Somatosensory)    Sensory Assessment (Somatosensory) sensation intact  -AO               User Key  (r) = Recorded By, (t) = Taken By, (c) = Cosigned By      Initials Name Provider Type    AO Moraima Mathew, PT Physical Therapist                   Goals/Plan    No documentation.                  Clinical Impression       Row Name 03/22/24 1507          Pain    Pretreatment Pain Rating 5/10  -AO     Posttreatment Pain Rating 5/10  -AO     Pain Location - Side/Orientation Left  -AO     Pain Location - hip  -AO       Row Name 03/22/24 1507          Plan of Care Review    Plan of Care Reviewed With patient  -AO     Progress no change  -AO     Outcome Evaluation Pt is a 49 y/o F who presented to [facility] w/ multifocal PNA, acute on chronic CHF, CML on chemo anemia, and DM type II. Pt reports she sustained a L hip fx last June 2023 (no surgery) and has had mobility impairments since, but typically MOD I with short-distance ambulatory transfers to/from w/c, has paid caregivers daily from 8am-4pm. Pt lives w/ mother and daugther in a H. Upon arrival, pt up on [facility] w/  bpm and reports she has been up ad-alessandro to/from [facility].  Pt completed pericare needs independently then completed stand pivot sit transfer from BSC to bed demonstrating MOD I, though with HR elevated to 170 bpm. Pt demonstrated MOD I w/ sit to supine transfer and HR remained in 160s (RN notified immediately). Pt reports she is functioning at her new baseline and does not wish to have therapy services in the [facility] or at d/c. Eval only. PPE: gown, gloves  -AO       Row Name 03/22/24 1507          Therapy Assessment/Plan (PT)    Criteria for Skilled Interventions Met (PT) no;patient/family refuse skilled intervention at this time  -AO     Therapy Frequency (PT) evaluation only  -AO       Row Name 03/22/24 1507          Vital Signs    Recovery Time Tachycardic with HR 150s-170 bpm (RN notified)  -AO       Row Name 03/22/24 1507          Positioning and Restraints    Pre-Treatment Position in bed  -AO     Post Treatment Position bed  -AO     In Bed notified nsg;call light within reach  -AO               User Key  (r) = Recorded By, (t) = Taken By, (c) = Cosigned By      Initials Name Provider Type    Moraima Kruger, PT Physical Therapist                   Outcome Measures       Row Name 03/22/24 0855          How much help from another person do you currently need...    Turning from your back to your side while in flat bed without using bedrails? 3  -SS     Moving from lying on back to sitting on the side of a flat bed without bedrails? 3  -SS     Moving to and from a bed to a chair (including a wheelchair)? 3  -SS     Standing up from a chair using your arms (e.g., wheelchair, bedside chair)? 2  -SS     Climbing 3-5 steps with a railing? 2  -SS     To walk in hospital room? 2  -SS     AM-PAC 6 Clicks Score (PT) 15  -SS     Highest Level of Mobility Goal 4 --> Transfer to chair/commode  -SS               User Key  (r) = Recorded By, (t) = Taken By, (c) = Cosigned By      Initials Name Provider Type    Sindhu Gifford LPN Licensed Nurse                                  Physical Therapy Education       Title: PT OT SLP Therapies (Done)       Topic: Physical Therapy (Done)       Point: Mobility training (Done)       Learning Progress Summary             Patient Acceptance, E,TB, VU by AO at 3/22/2024 1546                                         User Key       Initials Effective Dates Name Provider Type Discipline    AO 06/16/21 -  Moraima Mathew, PT Physical Therapist PT                  PT Recommendation and Plan     Plan of Care Reviewed With: patient  Progress: no change  Outcome Evaluation: Pt is a 47 y/o F who presented to [facility] w/ multifocal PNA, acute on chronic CHF, CML on chemo anemia, and DM type II. Pt reports she sustained a L hip fx last June 2023 (no surgery) and has had mobility impairments since, but typically MOD I with short-distance ambulatory transfers to/from w/c, has paid caregivers daily from 8am-4pm. Pt lives w/ mother and daugther in a H. Upon arrival, pt up on [facility] w/  bpm and reports she has been up ad-alessandro to/from BS. Pt completed pericare needs independently then completed stand pivot sit transfer from BSC to bed demonstrating MOD I, though with HR elevated to 170 bpm. Pt demonstrated MOD I w/ sit to supine transfer and HR remained in 160s (RN notified immediately). Pt reports she is functioning at her new baseline and does not wish to have therapy services in the hopsital or at d/c. Eval only. PPE: gown, gloves     Time Calculation:   PT Evaluation Complexity  History, PT Evaluation Complexity: 3 or more personal factors and/or comorbidities  Examination of Body Systems (PT Eval Complexity): total of 3 or more elements  Clinical Presentation (PT Evaluation Complexity): evolving  Clinical Decision Making (PT Evaluation Complexity): moderate complexity  Overall Complexity (PT Evaluation Complexity): moderate complexity     PT Charges       Row Name 03/22/24 1546 03/22/24 1401          Time Calculation    Start Time 1507  -AO --     Stop Time 1527   -AO --     Time Calculation (min) 20 min  -AO --     PT Received On 03/22/24  -AO --     PT - Next Appointment -- 03/23/24  -AO        Time Calculation- PT    Total Timed Code Minutes- PT 0 minute(s)  -AO --               User Key  (r) = Recorded By, (t) = Taken By, (c) = Cosigned By      Initials Name Provider Type    AO Moraima Mathew, PT Physical Therapist                  Therapy Charges for Today       Code Description Service Date Service Provider Modifiers Qty    [id number]  PT EVAL MOD COMPLEXITY 3 3/22/2024 Moraima Mathew, PT GP 1            PT G-Codes  AM-PAC 6 Clicks Score (PT): 15  PT Discharge Summary  Anticipated Discharge Disposition (PT): home with assist    Moraima Mathew, PT  3/22/2024

## 2024-03-22 NOTE — PLAN OF CARE
Goal Outcome Evaluation:  Plan of Care Reviewed With: patient        Progress: no change  Outcome Evaluation: Pt is a 49 y/o F who presented to [facility] w/ multifocal PNA, acute on chronic CHF, CML on chemo anemia, and DM type II. Pt reports she sustained a L hip fx last June 2023 (no surgery) and has had mobility impairments since, but typically MOD I with short-distance ambulatory transfers to/from w/c, has paid caregivers daily from 8am-4pm. Pt lives w/ mother and daugther in a H. Upon arrival, pt up on BS w/  bpm and reports she has been up ad-alessandro to/from [facility]. Pt completed pericare needs independently then completed stand pivot sit transfer from BS to bed demonstrating MOD I, though with HR elevated to 170 bpm. Pt demonstrated MOD I w/ sit to supine transfer and HR remained in 160s (RN notified immediately). Pt reports she is functioning at her new baseline and does not wish to have therapy services in the hopsital or at d/c. Eval only. PPE: gown, gloves      Anticipated Discharge Disposition (PT): home with assist

## 2024-03-22 NOTE — PLAN OF CARE
Goal Outcome Evaluation:              Outcome Evaluation: Patient has slept off and on during shift. PT ordered. Will be getting 1 unit of blood

## 2024-03-22 NOTE — CASE MANAGEMENT/SOCIAL WORK
Continued Stay Note  [facility]     Patient Name: Nieves Mc  MRN: [id number]  Today's Date: 3/22/2024    Admit Date: 3/19/2024    Plan: Return home with mother. Current caregiver through [employer] daily 8-4pm. Current with [facility] (will need NYA order.) PCP appointment made.   Discharge Plan       Row Name 03/22/24 1242       Plan    Plan Return home with mother. Current caregiver through [employer] daily 8-4pm. Current with [facility] (will need NYA order.) PCP appointment made.    Plan Comments [facility] called and established pt with a new PCP and scheduled a follow-up appointment as well as a regular yearly exam. All information updated in pt's AVS.                     Elsa Lawson RN     Office phone: [phone]  Office fax: [phone]

## 2024-03-23 LAB
ANION GAP SERPL CALCULATED.3IONS-SCNC: 9 MMOL/L (ref 5–15)
BUN SERPL-MCNC: 17 MG/DL (ref 6–20)
BUN/CREAT SERPL: 21.3 (ref 7–25)
CALCIUM SPEC-SCNC: 8.9 MG/DL (ref 8.6–10.5)
CHLORIDE SERPL-SCNC: 103 MMOL/L (ref 98–107)
CO2 SERPL-SCNC: 26 MMOL/L (ref 22–29)
CREAT SERPL-MCNC: 0.8 MG/DL (ref 0.57–1)
DEPRECATED RDW RBC AUTO: 52.7 FL (ref 37–54)
EGFRCR SERPLBLD CKD-EPI 2021: 91 ML/MIN/1.73
ERYTHROCYTE [DISTWIDTH] IN BLOOD BY AUTOMATED COUNT: 16.3 % (ref 12.3–15.4)
GLUCOSE BLDC GLUCOMTR-MCNC: 233 MG/DL (ref 70–105)
GLUCOSE BLDC GLUCOMTR-MCNC: 269 MG/DL (ref 70–105)
GLUCOSE BLDC GLUCOMTR-MCNC: 375 MG/DL (ref 70–105)
GLUCOSE BLDC GLUCOMTR-MCNC: 432 MG/DL (ref 70–105)
GLUCOSE SERPL-MCNC: 383 MG/DL (ref 65–99)
HCT VFR BLD AUTO: 31.4 % (ref 34–46.6)
HGB BLD-MCNC: 9.5 G/DL (ref 12–15.9)
MAGNESIUM SERPL-MCNC: 1.7 MG/DL (ref 1.6–2.6)
MCH RBC QN AUTO: 27.1 PG (ref 26.6–33)
MCHC RBC AUTO-ENTMCNC: 30.3 G/DL (ref 31.5–35.7)
MCV RBC AUTO: 89.7 FL (ref 79–97)
PLATELET # BLD AUTO: 141 10*3/MM3 (ref 140–450)
PMV BLD AUTO: 10.4 FL (ref 6–12)
POTASSIUM SERPL-SCNC: 5.7 MMOL/L (ref 3.5–5.2)
RBC # BLD AUTO: 3.5 10*6/MM3 (ref 3.77–5.28)
SODIUM SERPL-SCNC: 138 MMOL/L (ref 136–145)
WBC NRBC COR # BLD AUTO: 25.27 10*3/MM3 (ref 3.4–10.8)

## 2024-03-23 PROCEDURE — 25010000002 CALCIUM GLUCONATE-NACL 1-0.675 GM/50ML-% SOLUTION: Performed by: INTERNAL MEDICINE

## 2024-03-23 PROCEDURE — 93010 ELECTROCARDIOGRAM REPORT: CPT | Performed by: INTERNAL MEDICINE

## 2024-03-23 PROCEDURE — 83735 ASSAY OF MAGNESIUM: CPT | Performed by: NURSE PRACTITIONER

## 2024-03-23 PROCEDURE — 63710000001 INSULIN REGULAR HUMAN PER 5 UNITS: Performed by: INTERNAL MEDICINE

## 2024-03-23 PROCEDURE — 80048 BASIC METABOLIC PNL TOTAL CA: CPT | Performed by: NURSE PRACTITIONER

## 2024-03-23 PROCEDURE — 25010000002 FUROSEMIDE PER 20 MG: Performed by: INTERNAL MEDICINE

## 2024-03-23 PROCEDURE — 25010000002 MAGNESIUM SULFATE 2 GM/50ML SOLUTION: Performed by: INTERNAL MEDICINE

## 2024-03-23 PROCEDURE — 63710000001 INSULIN GLARGINE PER 5 UNITS: Performed by: HOSPITALIST

## 2024-03-23 PROCEDURE — 82948 REAGENT STRIP/BLOOD GLUCOSE: CPT

## 2024-03-23 PROCEDURE — 63710000001 INSULIN LISPRO (HUMAN) PER 5 UNITS: Performed by: STUDENT IN AN ORGANIZED HEALTH CARE EDUCATION/TRAINING PROGRAM

## 2024-03-23 PROCEDURE — 93005 ELECTROCARDIOGRAM TRACING: CPT | Performed by: INTERNAL MEDICINE

## 2024-03-23 PROCEDURE — 25010000002 ENOXAPARIN PER 10 MG: Performed by: HOSPITALIST

## 2024-03-23 PROCEDURE — 85027 COMPLETE CBC AUTOMATED: CPT | Performed by: NURSE PRACTITIONER

## 2024-03-23 RX ORDER — FUROSEMIDE 10 MG/ML
20 INJECTION INTRAMUSCULAR; INTRAVENOUS ONCE
Status: COMPLETED | OUTPATIENT
Start: 2024-03-23 | End: 2024-03-23

## 2024-03-23 RX ORDER — CALCIUM GLUCONATE 20 MG/ML
1000 INJECTION, SOLUTION INTRAVENOUS ONCE
Status: COMPLETED | OUTPATIENT
Start: 2024-03-23 | End: 2024-03-23

## 2024-03-23 RX ORDER — MAGNESIUM SULFATE HEPTAHYDRATE 40 MG/ML
2 INJECTION, SOLUTION INTRAVENOUS ONCE
Qty: 50 ML | Refills: 0 | Status: COMPLETED | OUTPATIENT
Start: 2024-03-23 | End: 2024-03-23

## 2024-03-23 RX ADMIN — METOPROLOL SUCCINATE 25 MG: 25 TABLET, EXTENDED RELEASE ORAL at 09:30

## 2024-03-23 RX ADMIN — AMOXICILLIN AND CLAVULANATE POTASSIUM 1 TABLET: 875; 125 TABLET, FILM COATED ORAL at 09:29

## 2024-03-23 RX ADMIN — Medication 10 ML: at 20:36

## 2024-03-23 RX ADMIN — GABAPENTIN 800 MG: 400 CAPSULE ORAL at 05:10

## 2024-03-23 RX ADMIN — ALPRAZOLAM 0.5 MG: 0.5 TABLET ORAL at 20:35

## 2024-03-23 RX ADMIN — GABAPENTIN 800 MG: 400 CAPSULE ORAL at 14:34

## 2024-03-23 RX ADMIN — EMPAGLIFLOZIN 10 MG: 10 TABLET, FILM COATED ORAL at 09:29

## 2024-03-23 RX ADMIN — AMOXICILLIN AND CLAVULANATE POTASSIUM 1 TABLET: 875; 125 TABLET, FILM COATED ORAL at 20:35

## 2024-03-23 RX ADMIN — INSULIN GLARGINE 25 UNITS: 100 INJECTION, SOLUTION SUBCUTANEOUS at 20:35

## 2024-03-23 RX ADMIN — MAGNESIUM SULFATE HEPTAHYDRATE 2 G: 40 INJECTION, SOLUTION INTRAVENOUS at 10:41

## 2024-03-23 RX ADMIN — LOSARTAN POTASSIUM 25 MG: 25 TABLET, FILM COATED ORAL at 09:29

## 2024-03-23 RX ADMIN — INSULIN HUMAN 10 UNITS: 100 INJECTION, SOLUTION PARENTERAL at 09:45

## 2024-03-23 RX ADMIN — SODIUM ZIRCONIUM CYCLOSILICATE 10 G: 10 POWDER, FOR SUSPENSION ORAL at 09:42

## 2024-03-23 RX ADMIN — METOPROLOL SUCCINATE 25 MG: 25 TABLET, EXTENDED RELEASE ORAL at 20:35

## 2024-03-23 RX ADMIN — SODIUM ZIRCONIUM CYCLOSILICATE 10 G: 10 POWDER, FOR SUSPENSION ORAL at 20:35

## 2024-03-23 RX ADMIN — INSULIN LISPRO 8 UNITS: 100 INJECTION, SOLUTION INTRAVENOUS; SUBCUTANEOUS at 09:28

## 2024-03-23 RX ADMIN — CALCIUM GLUCONATE 1000 MG: 20 INJECTION, SOLUTION INTRAVENOUS at 09:29

## 2024-03-23 RX ADMIN — INSULIN LISPRO 12 UNITS: 100 INJECTION, SOLUTION INTRAVENOUS; SUBCUTANEOUS at 16:42

## 2024-03-23 RX ADMIN — ENOXAPARIN SODIUM 40 MG: 100 INJECTION SUBCUTANEOUS at 16:42

## 2024-03-23 RX ADMIN — FUROSEMIDE 40 MG: 40 TABLET ORAL at 09:29

## 2024-03-23 RX ADMIN — Medication 10 ML: at 09:42

## 2024-03-23 RX ADMIN — INSULIN LISPRO 14 UNITS: 100 INJECTION, SOLUTION INTRAVENOUS; SUBCUTANEOUS at 21:13

## 2024-03-23 RX ADMIN — FUROSEMIDE 20 MG: 10 INJECTION, SOLUTION INTRAMUSCULAR; INTRAVENOUS at 09:27

## 2024-03-23 RX ADMIN — GABAPENTIN 800 MG: 400 CAPSULE ORAL at 20:35

## 2024-03-23 RX ADMIN — INSULIN LISPRO 5 UNITS: 100 INJECTION, SOLUTION INTRAVENOUS; SUBCUTANEOUS at 12:27

## 2024-03-23 NOTE — PLAN OF CARE
Goal Outcome Evaluation:  Plan of Care Reviewed With: patient        Progress: no change  Outcome Evaluation: Patient shows no s/s of distress and vitals are stable. Pt voiced no concerns. Call light within reach.

## 2024-03-23 NOTE — PLAN OF CARE
Goal Outcome Evaluation:  Plan of Care Reviewed With: patient        Progress: no change  Outcome Evaluation: Patient receiving unit of PRBC.  No complaints or reactions noted.   Will continue to monitor.

## 2024-03-23 NOTE — PROGRESS NOTES
[facility] MEDICINE SERVICE  DAILY PROGRESS NOTE    NAME: Nieves Mc  : 1975  MRN: [id number]      LOS: 4 days     PROVIDER OF SERVICE: Obdulia Yanez MD    Chief Complaint: Multifocal pneumonia    Subjective:     Interval History:  History taken from: patient  The patient was seen and examined today at bedside.  The patient said that she is feeling a little achy.  She saw her oncologist and cardiologist.  She denied any new symptoms today.  She received her second RBC unit    Review of Systems:   Review of Systems was performed and negative except as in the rest of the note    Objective:     Vital Signs  Temp:  [97.5 °F (36.4 °C)-98.5 °F (36.9 °C)] 98.5 °F (36.9 °C)  Heart Rate:  [] 107  Resp:  [14-17] 16  BP: (114-139)/(74-93) 139/83  Flow (L/min):  [2] 2   Body mass index is 29.37 kg/m².    Physical Exam  Physical Exam  Constitutional:      Normal appearance.   HENT:      Head: Normocephalic and atraumatic.      Mouth/Throat:      Mouth: Mucous membranes are moist.   Cardiovascular:      Rate and Rhythm: Normal rate and regular rhythm.   Pulmonary:      Effort: Pulmonary effort is normal.   Diminished breath sounds  Abdominal:      General: Bowel sounds are normal.      Palpations: Abdomen is soft.      Tenderness: There is no abdominal tenderness.  There is a little bruise in the left lower abdomen.  Patient said it is due to injection  Skin:     General: Skin is warm and dry.   Neurological:      General: No focal deficit present.      Mental Status: She is alert and oriented to person, place, and time.   Psychiatric:      Normal affect  Scheduled Meds   amoxicillin-clavulanate, 1 tablet, Oral, Q12H  calcium gluconate, 1,000 mg, Intravenous, Once  empagliflozin, 10 mg, Oral, Daily  enoxaparin, 40 mg, Subcutaneous, Daily  furosemide, 40 mg, Oral, Daily  gabapentin, 800 mg, Oral, Q8H  insulin glargine, 25 Units, Subcutaneous, Nightly  insulin lispro, 3-14 Units, Subcutaneous, 4x  Daily AC & at Bedtime  insulin regular, 10 Units, Intravenous, Once  losartan, 25 mg, Oral, Q24H  metoprolol succinate XL, 25 mg, Oral, Q12H  sodium chloride, 10 mL, Intravenous, Q12H  sodium zirconium cyclosilicate, 10 g, Oral, BID       PRN Meds     acetaminophen    ALPRAZolam    senna-docusate sodium **AND** polyethylene glycol **AND** bisacodyl **AND** bisacodyl    Calcium Replacement - Follow Nurse / BPA Driven Protocol    dextrose    dextrose    glucagon (human recombinant)    ipratropium-albuterol    Magnesium Standard Dose Replacement - Follow Nurse / BPA Driven Protocol    metoprolol tartrate    nitroglycerin    ondansetron    Phosphorus Replacement - Follow Nurse / BPA Driven Protocol    Potassium Replacement - Follow Nurse / BPA Driven Protocol    sodium chloride    sodium chloride    sodium chloride   Infusions  pain,           Diagnostic Data    Results from last 7 days   Lab Units 03/23/24  0259 03/19/24  1841 03/19/24  1521   WBC 10*3/mm3 25.27*   < > 35.96*   HEMOGLOBIN g/dL 9.5*   < > 7.9*   HEMATOCRIT % 31.4*   < > 27.6*   PLATELETS 10*3/mm3 141   < > 209   GLUCOSE mg/dL 383*   < > 451*   CREATININE mg/dL 0.80   < > 0.84   BUN mg/dL 17   < > 17   SODIUM mmol/L 138   < > 133*   POTASSIUM mmol/L 5.7*   < > 5.1   AST (SGOT) U/L  --   --  20   ALT (SGPT) U/L  --   --  17   ALK PHOS U/L  --   --  607*   BILIRUBIN mg/dL  --   --  1.9*   ANION GAP mmol/L 9.0   < > 12.0    < > = values in this interval not displayed.       No radiology results for the last day      I reviewed the patient's new clinical results.    Assessment/Plan:     Active and Resolved Problems  Active Hospital Problems    Diagnosis  POA    **Multifocal pneumonia [J18.9]  Yes    Bilateral lower extremity edema [R60.0]  Yes    Acute on chronic HFrEF (heart failure with reduced ejection fraction) [I50.23]  Yes    Acute respiratory failure with hypoxia [J96.01]  Yes    Anemia due to chemotherapy [D64.81, T45.1X5A]  Yes    NICM (nonischemic  cardiomyopathy) [I42.8]  Yes    Type 2 diabetes mellitus with diabetic polyneuropathy, with long-term current use of insulin [E11.42, Z79.4]  Not Applicable    Medically noncompliant [Z91.199]  Not Applicable    CML (chronic myelocytic leukemia) [C92.10]  Yes      Resolved Hospital Problems   No resolved problems to display.       Multifocal pneumonia    CTA chest:  1. No acute pulmonary embolic disease.  2. Volume loss particular right side. The right hemidiaphragm is markedly elevated. This could be secondary to diaphragmatic paralysis.  3. Extensive right upper lobe and to a lesser degree right lower lobe pneumonia. There is also abnormal groundglass density and interstitial thickening. I would favor multifocal pneumonia possibly from an atypical infection.  4. Trace right pleural effusion.  5. Hepatosplenomegaly.  Discontinued cefepime and azithromycin.  Switched over to p.o. Augmentin.  Tested negative for MRSA vancomycin discontinued.  Blood cultures no growth so far, respiratory viral panel negative     Hyperkalemia  K today was: 5.7  Ordered hyperkalemia protocol, Including EKG, calcium gluconate, Lasix, insulin and glucose, Kayexalate  EKG showed sinus tachycardia  Will follow-up on the hyperkalemia    Magnesium: 1.7  Will give 2 g     Acute on chronic HFrEF (heart failure with reduced ejection fraction) secondary to NICM (nonischemic cardiomyopathy)   Non compliant with medication and outpatient follow-up per cardiology  Previous EF 26-30% in Nov. 2022   Echo from 3/10/2024 showed EF of 44% with moderately elevated right ventricular systolic pressure.    Lasix changed from IV to oral per cardiology  On Toprol XL 25, Jardiance 10 and losartan 25  Cardiology consulted and uptitrating GDMT.     CML  WBC 35.9>29.8> 33.43>22--> 25.2  On chemo  Repeat CBC  Oncology has seen the patient: Recommended to hold ponatinib due to acute infection, monitor CBC  Patient to follow-up with heme-onc as an outpatient.      Anemia  H&H 6.5 on presentation.  Given 2 units of packed RBCs  Hemoglobin today is 9.5  Repeat H&H stable. Daily monitoring.         Diabetes type 2  Hyperglycemia  Blood glucose 269  Resume home insulin regimen  Add sliding scale insulin  hemoglobin A1c 9.70      Medically noncompliant  Compliance encouraged        DVT prophylaxis:  Medical and mechanical DVT prophylaxis orders are present.         Code status is   Code Status and Medical Interventions:   Ordered at: 03/19/24 2044     Code Status (Patient has no pulse and is not breathing):    CPR (Attempt to Resuscitate)     Medical Interventions (Patient has pulse or is breathing):    Full Support       Plan for disposition: Likely home in couple of days    Time: 30 minutes    Signature: Electronically signed by Obdulia Yanez MD, 03/23/24, 09:31 EDT.  [facility] Hospitalist Team

## 2024-03-24 LAB
ANION GAP SERPL CALCULATED.3IONS-SCNC: 13 MMOL/L (ref 5–15)
ANISOCYTOSIS BLD QL: ABNORMAL
BACTERIA SPEC AEROBE CULT: NORMAL
BACTERIA SPEC AEROBE CULT: NORMAL
BASOPHILS # BLD MANUAL: 1.25 10*3/MM3 (ref 0–0.2)
BASOPHILS NFR BLD MANUAL: 4 % (ref 0–1.5)
BH BB BLOOD EXPIRATION DATE: NORMAL
BH BB BLOOD TYPE BARCODE: 9500
BH BB DISPENSE STATUS: NORMAL
BH BB PRODUCT CODE: NORMAL
BH BB UNIT NUMBER: NORMAL
BUN SERPL-MCNC: 26 MG/DL (ref 6–20)
BUN/CREAT SERPL: 22.6 (ref 7–25)
CALCIUM SPEC-SCNC: 9.1 MG/DL (ref 8.6–10.5)
CHLORIDE SERPL-SCNC: 101 MMOL/L (ref 98–107)
CO2 SERPL-SCNC: 28 MMOL/L (ref 22–29)
CREAT SERPL-MCNC: 1.15 MG/DL (ref 0.57–1)
CROSSMATCH INTERPRETATION: NORMAL
DEPRECATED RDW RBC AUTO: 55.9 FL (ref 37–54)
EGFRCR SERPLBLD CKD-EPI 2021: 58.9 ML/MIN/1.73
EOSINOPHIL # BLD MANUAL: 1.25 10*3/MM3 (ref 0–0.4)
EOSINOPHIL NFR BLD MANUAL: 4 % (ref 0.3–6.2)
ERYTHROCYTE [DISTWIDTH] IN BLOOD BY AUTOMATED COUNT: 17 % (ref 12.3–15.4)
GLUCOSE BLDC GLUCOMTR-MCNC: 245 MG/DL (ref 70–105)
GLUCOSE BLDC GLUCOMTR-MCNC: 296 MG/DL (ref 70–105)
GLUCOSE BLDC GLUCOMTR-MCNC: 327 MG/DL (ref 70–105)
GLUCOSE BLDC GLUCOMTR-MCNC: 407 MG/DL (ref 70–105)
GLUCOSE SERPL-MCNC: 221 MG/DL (ref 65–99)
HCT VFR BLD AUTO: 31.3 % (ref 34–46.6)
HGB BLD-MCNC: 9.4 G/DL (ref 12–15.9)
LARGE PLATELETS: ABNORMAL
LYMPHOCYTES # BLD MANUAL: 5.96 10*3/MM3 (ref 0.7–3.1)
LYMPHOCYTES NFR BLD MANUAL: 2 % (ref 5–12)
MAGNESIUM SERPL-MCNC: 2.3 MG/DL (ref 1.6–2.6)
MCH RBC QN AUTO: 27.2 PG (ref 26.6–33)
MCHC RBC AUTO-ENTMCNC: 30 G/DL (ref 31.5–35.7)
MCV RBC AUTO: 90.7 FL (ref 79–97)
METAMYELOCYTES NFR BLD MANUAL: 3 % (ref 0–0)
MONOCYTES # BLD: 0.63 10*3/MM3 (ref 0.1–0.9)
MYELOCYTES NFR BLD MANUAL: 15 % (ref 0–0)
NEUTROPHILS # BLD AUTO: 15.99 10*3/MM3 (ref 1.7–7)
NEUTROPHILS NFR BLD MANUAL: 44 % (ref 42.7–76)
NEUTS BAND NFR BLD MANUAL: 7 % (ref 0–5)
NRBC SPEC MANUAL: 2 /100 WBC (ref 0–0.2)
PLATELET # BLD AUTO: 183 10*3/MM3 (ref 140–450)
PMV BLD AUTO: 10.9 FL (ref 6–12)
POIKILOCYTOSIS BLD QL SMEAR: ABNORMAL
POTASSIUM SERPL-SCNC: 5.2 MMOL/L (ref 3.5–5.2)
PROMYELOCYTES NFR BLD MANUAL: 2 % (ref 0–0)
RBC # BLD AUTO: 3.45 10*6/MM3 (ref 3.77–5.28)
SODIUM SERPL-SCNC: 142 MMOL/L (ref 136–145)
TOXIC GRANULATION: ABNORMAL
UNIT  ABO: NORMAL
UNIT  RH: NORMAL
VARIANT LYMPHS NFR BLD MANUAL: 19 % (ref 19.6–45.3)
WBC NRBC COR # BLD AUTO: 31.35 10*3/MM3 (ref 3.4–10.8)

## 2024-03-24 PROCEDURE — 85025 COMPLETE CBC W/AUTO DIFF WBC: CPT | Performed by: INTERNAL MEDICINE

## 2024-03-24 PROCEDURE — 85007 BL SMEAR W/DIFF WBC COUNT: CPT | Performed by: INTERNAL MEDICINE

## 2024-03-24 PROCEDURE — 25010000002 ENOXAPARIN PER 10 MG: Performed by: HOSPITALIST

## 2024-03-24 PROCEDURE — 82948 REAGENT STRIP/BLOOD GLUCOSE: CPT

## 2024-03-24 PROCEDURE — 82948 REAGENT STRIP/BLOOD GLUCOSE: CPT | Performed by: INTERNAL MEDICINE

## 2024-03-24 PROCEDURE — 83735 ASSAY OF MAGNESIUM: CPT | Performed by: NURSE PRACTITIONER

## 2024-03-24 PROCEDURE — 63710000001 INSULIN GLARGINE PER 5 UNITS: Performed by: HOSPITALIST

## 2024-03-24 PROCEDURE — 63710000001 INSULIN LISPRO (HUMAN) PER 5 UNITS: Performed by: INTERNAL MEDICINE

## 2024-03-24 PROCEDURE — 80048 BASIC METABOLIC PNL TOTAL CA: CPT | Performed by: INTERNAL MEDICINE

## 2024-03-24 PROCEDURE — 63710000001 INSULIN LISPRO (HUMAN) PER 5 UNITS: Performed by: STUDENT IN AN ORGANIZED HEALTH CARE EDUCATION/TRAINING PROGRAM

## 2024-03-24 RX ORDER — INSULIN LISPRO 100 [IU]/ML
5 INJECTION, SOLUTION INTRAVENOUS; SUBCUTANEOUS
Status: DISCONTINUED | OUTPATIENT
Start: 2024-03-24 | End: 2024-03-25

## 2024-03-24 RX ADMIN — METOPROLOL SUCCINATE 25 MG: 25 TABLET, EXTENDED RELEASE ORAL at 08:12

## 2024-03-24 RX ADMIN — INSULIN LISPRO 5 UNITS: 100 INJECTION, SOLUTION INTRAVENOUS; SUBCUTANEOUS at 16:42

## 2024-03-24 RX ADMIN — Medication 10 ML: at 08:12

## 2024-03-24 RX ADMIN — METOPROLOL SUCCINATE 25 MG: 25 TABLET, EXTENDED RELEASE ORAL at 21:06

## 2024-03-24 RX ADMIN — GABAPENTIN 800 MG: 400 CAPSULE ORAL at 13:39

## 2024-03-24 RX ADMIN — LOSARTAN POTASSIUM 25 MG: 25 TABLET, FILM COATED ORAL at 08:12

## 2024-03-24 RX ADMIN — INSULIN LISPRO 5 UNITS: 100 INJECTION, SOLUTION INTRAVENOUS; SUBCUTANEOUS at 12:11

## 2024-03-24 RX ADMIN — AMOXICILLIN AND CLAVULANATE POTASSIUM 1 TABLET: 875; 125 TABLET, FILM COATED ORAL at 08:12

## 2024-03-24 RX ADMIN — ENOXAPARIN SODIUM 40 MG: 100 INJECTION SUBCUTANEOUS at 16:41

## 2024-03-24 RX ADMIN — GABAPENTIN 800 MG: 400 CAPSULE ORAL at 05:29

## 2024-03-24 RX ADMIN — INSULIN LISPRO 8 UNITS: 100 INJECTION, SOLUTION INTRAVENOUS; SUBCUTANEOUS at 08:12

## 2024-03-24 RX ADMIN — INSULIN LISPRO 10 UNITS: 100 INJECTION, SOLUTION INTRAVENOUS; SUBCUTANEOUS at 16:42

## 2024-03-24 RX ADMIN — FUROSEMIDE 40 MG: 40 TABLET ORAL at 08:12

## 2024-03-24 RX ADMIN — INSULIN LISPRO 14 UNITS: 100 INJECTION, SOLUTION INTRAVENOUS; SUBCUTANEOUS at 20:12

## 2024-03-24 RX ADMIN — GABAPENTIN 800 MG: 400 CAPSULE ORAL at 21:06

## 2024-03-24 RX ADMIN — EMPAGLIFLOZIN 10 MG: 10 TABLET, FILM COATED ORAL at 08:12

## 2024-03-24 RX ADMIN — INSULIN GLARGINE 25 UNITS: 100 INJECTION, SOLUTION SUBCUTANEOUS at 20:13

## 2024-03-24 RX ADMIN — AMOXICILLIN AND CLAVULANATE POTASSIUM 1 TABLET: 875; 125 TABLET, FILM COATED ORAL at 20:12

## 2024-03-24 RX ADMIN — SODIUM ZIRCONIUM CYCLOSILICATE 10 G: 10 POWDER, FOR SUSPENSION ORAL at 20:12

## 2024-03-24 RX ADMIN — Medication 10 ML: at 20:16

## 2024-03-24 RX ADMIN — SODIUM ZIRCONIUM CYCLOSILICATE 10 G: 10 POWDER, FOR SUSPENSION ORAL at 09:07

## 2024-03-24 NOTE — PROGRESS NOTES
[facility] MEDICINE SERVICE  DAILY PROGRESS NOTE    NAME: Nieves Mc  : 1975  MRN: [id number]      LOS: 5 days     PROVIDER OF SERVICE: Obdulia Yanez MD    Chief Complaint: Multifocal pneumonia    Subjective:     Interval History:  History taken from: patient  The patient was seen and examined today at bedside.  The patient said that she feels better today.  The patient has a PICC line in her left arm and she says that she had this before her admission and it is needed by her oncologist for blood drawing.  The patient denied any significant symptoms today.      Review of Systems:   Review of Systems was performed and negative except as in the rest of the note    Objective:     Vital Signs  Temp:  [97.8 °F (36.6 °C)-98.1 °F (36.7 °C)] 98.1 °F (36.7 °C)  Heart Rate:  [] 101  Resp:  [12-19] 13  BP: (109-126)/(63-75) 112/69  Flow (L/min):  [1] 1   Body mass index is 29.37 kg/m².    Physical Exam  Physical Exam  Constitutional:      Normal appearance.   HENT:      Head: Normocephalic and atraumatic.      Mouth/Throat:      Mouth: Mucous membranes are moist.   Cardiovascular:      Rate and Rhythm: Normal rate and regular rhythm.   Pulmonary:      Effort: Pulmonary effort is normal.   Diminished breath sounds  Abdominal:      General: Bowel sounds are normal.      Palpations: Abdomen is soft.      Tenderness: There is no abdominal tenderness.  There is a little bruise in the left lower abdomen.  Patient said it is due to injection  Skin:     General: Skin is warm and dry.   Neurological:      General: No focal deficit present.      Mental Status: She is alert and oriented to person, place, and time.   Psychiatric:      Normal affect  Scheduled Meds   amoxicillin-clavulanate, 1 tablet, Oral, Q12H  empagliflozin, 10 mg, Oral, Daily  enoxaparin, 40 mg, Subcutaneous, Daily  furosemide, 40 mg, Oral, Daily  gabapentin, 800 mg, Oral, Q8H  insulin glargine, 25 Units, Subcutaneous, Nightly  insulin  lispro, 3-14 Units, Subcutaneous, 4x Daily AC & at Bedtime  losartan, 25 mg, Oral, Q24H  metoprolol succinate XL, 25 mg, Oral, Q12H  sodium chloride, 10 mL, Intravenous, Q12H  sodium zirconium cyclosilicate, 10 g, Oral, BID       PRN Meds     acetaminophen    ALPRAZolam    senna-docusate sodium **AND** polyethylene glycol **AND** bisacodyl **AND** bisacodyl    Calcium Replacement - Follow Nurse / BPA Driven Protocol    dextrose    dextrose    glucagon (human recombinant)    ipratropium-albuterol    Magnesium Standard Dose Replacement - Follow Nurse / BPA Driven Protocol    metoprolol tartrate    nitroglycerin    ondansetron    Phosphorus Replacement - Follow Nurse / BPA Driven Protocol    Potassium Replacement - Follow Nurse / BPA Driven Protocol    sodium chloride    sodium chloride    sodium chloride   Infusions  pain,           Diagnostic Data    Results from last 7 days   Lab Units 03/24/24  0440 03/19/24  1841 03/19/24  1521   WBC 10*3/mm3 31.35*   < > 35.96*   HEMOGLOBIN g/dL 9.4*   < > 7.9*   HEMATOCRIT % 31.3*   < > 27.6*   PLATELETS 10*3/mm3 183   < > 209   GLUCOSE mg/dL 221*   < > 451*   CREATININE mg/dL 1.15*   < > 0.84   BUN mg/dL 26*   < > 17   SODIUM mmol/L 142   < > 133*   POTASSIUM mmol/L 5.2   < > 5.1   AST (SGOT) U/L  --   --  20   ALT (SGPT) U/L  --   --  17   ALK PHOS U/L  --   --  607*   BILIRUBIN mg/dL  --   --  1.9*   ANION GAP mmol/L 13.0   < > 12.0    < > = values in this interval not displayed.       No radiology results for the last day      I reviewed the patient's new clinical results.    Assessment/Plan:     Active and Resolved Problems  Active Hospital Problems    Diagnosis  POA    **Multifocal pneumonia [J18.9]  Yes    Bilateral lower extremity edema [R60.0]  Yes    Acute on chronic HFrEF (heart failure with reduced ejection fraction) [I50.23]  Yes    Acute respiratory failure with hypoxia [J96.01]  Yes    Anemia due to chemotherapy [D64.81, T45.1X5A]  Yes    NICM (nonischemic  cardiomyopathy) [I42.8]  Yes    Type 2 diabetes mellitus with diabetic polyneuropathy, with long-term current use of insulin [E11.42, Z79.4]  Not Applicable    Medically noncompliant [Z91.199]  Not Applicable    CML (chronic myelocytic leukemia) [C92.10]  Yes      Resolved Hospital Problems   No resolved problems to display.       Multifocal pneumonia    CTA chest:  1. No acute pulmonary embolic disease.  2. Volume loss particular right side. The right hemidiaphragm is markedly elevated. This could be secondary to diaphragmatic paralysis.  3. Extensive right upper lobe and to a lesser degree right lower lobe pneumonia. There is also abnormal groundglass density and interstitial thickening. I would favor multifocal pneumonia possibly from an atypical infection.  4. Trace right pleural effusion.  5. Hepatosplenomegaly.  Discontinued cefepime and azithromycin.  Switched over to p.o. Augmentin.  Tested negative for MRSA vancomycin discontinued.  Blood cultures no growth so far, respiratory viral panel negative   Augmentin day 3 out of 4    Hyperkalemia  Resolved  Today's potassium 5.2    Magnesium: 2.3       Acute on chronic HFrEF (heart failure with reduced ejection fraction) secondary to NICM (nonischemic cardiomyopathy)   Non compliant with medication and outpatient follow-up per cardiology  Previous EF 26-30% in Nov. 2022   Echo from 3/10/2024 showed EF of 44% with moderately elevated right ventricular systolic pressure.    Lasix changed from IV to 40 mg daily oral per cardiology  On Toprol XL 25, Jardiance 10 and losartan 25  Cardiology consulted and uptitrating GDMT.     CML  WBC 35.9>29.8> 33.43>22--> 25.2-->31.35  On chemo  Repeat CBC  Oncology has seen the patient: Recommended to hold ponatinib due to acute infection, monitor CBC  Patient to follow-up with heme-onc as an outpatient.     Anemia  H&H 6.5 on presentation.  Given 2 units of packed RBCs  Hemoglobin 9.5-->9.4  Repeat H&H stable. Daily monitoring.          Diabetes type 2  Hyperglycemia  Blood glucose 269-->245  Resume home insulin regimen: Lantus 25  The patient is also on Humalog 7 units 3 times daily, will give 5 units 3 times daily and will continue sliding scale  hemoglobin A1c 9.70      Medically noncompliant  Compliance encouraged        DVT prophylaxis:  Medical and mechanical DVT prophylaxis orders are present.         Code status is   Code Status and Medical Interventions:   Ordered at: 03/19/24 2044     Code Status (Patient has no pulse and is not breathing):    CPR (Attempt to Resuscitate)     Medical Interventions (Patient has pulse or is breathing):    Full Support       Plan for disposition: Likely home tomorrow    Copied text in this note has been reviewed and is accurate as of 03/24/2024    Time: 30 minutes    Signature: Electronically signed by Obdulia Yanez MD, 03/24/24, 14:34 EDT.  [facility], [place], operated by [facility] Hospitalist Team

## 2024-03-25 ENCOUNTER — READMISSION MANAGEMENT (OUTPATIENT)
Dept: CALL CENTER | Facility: HOSPITAL | Age: 49
End: 2024-03-25
Payer: MEDICARE

## 2024-03-25 ENCOUNTER — DOCUMENTATION (OUTPATIENT)
Dept: HOME HEALTH SERVICES | Facility: HOME HEALTHCARE | Age: 49
End: 2024-03-25
Payer: COMMERCIAL

## 2024-03-25 ENCOUNTER — HOME CARE VISIT (OUTPATIENT)
Dept: HOME HEALTH SERVICES | Facility: HOME HEALTHCARE | Age: 49
End: 2024-03-25
Payer: COMMERCIAL

## 2024-03-25 VITALS
HEIGHT: 63 IN | SYSTOLIC BLOOD PRESSURE: 122 MMHG | TEMPERATURE: 98 F | WEIGHT: 165.79 LBS | BODY MASS INDEX: 29.38 KG/M2 | RESPIRATION RATE: 10 BRPM | HEART RATE: 90 BPM | DIASTOLIC BLOOD PRESSURE: 80 MMHG | OXYGEN SATURATION: 95 %

## 2024-03-25 PROBLEM — J96.01 ACUTE RESPIRATORY FAILURE WITH HYPOXIA: Status: RESOLVED | Noted: 2024-03-19 | Resolved: 2024-03-25

## 2024-03-25 PROBLEM — I50.33 ACUTE ON CHRONIC HEART FAILURE WITH PRESERVED EJECTION FRACTION (HFPEF): Status: ACTIVE | Noted: 2024-03-25

## 2024-03-25 PROBLEM — J18.9 MULTIFOCAL PNEUMONIA: Status: RESOLVED | Noted: 2024-03-19 | Resolved: 2024-03-25

## 2024-03-25 LAB
ANION GAP SERPL CALCULATED.3IONS-SCNC: 9 MMOL/L (ref 5–15)
BLASTS NFR BLD MANUAL: 1 % (ref 0–0)
BUN SERPL-MCNC: 22 MG/DL (ref 6–20)
BUN/CREAT SERPL: 27.8 (ref 7–25)
CALCIUM SPEC-SCNC: 8.5 MG/DL (ref 8.6–10.5)
CHLORIDE SERPL-SCNC: 100 MMOL/L (ref 98–107)
CO2 SERPL-SCNC: 29 MMOL/L (ref 22–29)
CREAT SERPL-MCNC: 0.79 MG/DL (ref 0.57–1)
DEPRECATED RDW RBC AUTO: 56.4 FL (ref 37–54)
EGFRCR SERPLBLD CKD-EPI 2021: 92.4 ML/MIN/1.73
EOSINOPHIL # BLD MANUAL: 0.26 10*3/MM3 (ref 0–0.4)
EOSINOPHIL NFR BLD MANUAL: 1 % (ref 0.3–6.2)
ERYTHROCYTE [DISTWIDTH] IN BLOOD BY AUTOMATED COUNT: 16.9 % (ref 12.3–15.4)
GLUCOSE BLDC GLUCOMTR-MCNC: 128 MG/DL (ref 70–105)
GLUCOSE BLDC GLUCOMTR-MCNC: 203 MG/DL (ref 70–105)
GLUCOSE SERPL-MCNC: 308 MG/DL (ref 65–99)
HCT VFR BLD AUTO: 30.6 % (ref 34–46.6)
HGB BLD-MCNC: 9.1 G/DL (ref 12–15.9)
LYMPHOCYTES # BLD MANUAL: 7.38 10*3/MM3 (ref 0.7–3.1)
LYMPHOCYTES NFR BLD MANUAL: 2 % (ref 5–12)
MAGNESIUM SERPL-MCNC: 2.2 MG/DL (ref 1.6–2.6)
MCH RBC QN AUTO: 27.2 PG (ref 26.6–33)
MCHC RBC AUTO-ENTMCNC: 29.7 G/DL (ref 31.5–35.7)
MCV RBC AUTO: 91.3 FL (ref 79–97)
METAMYELOCYTES NFR BLD MANUAL: 1 % (ref 0–0)
MONOCYTES # BLD: 0.53 10*3/MM3 (ref 0.1–0.9)
MYELOCYTES NFR BLD MANUAL: 4 % (ref 0–0)
NEUTROPHILS # BLD AUTO: 16.08 10*3/MM3 (ref 1.7–7)
NEUTROPHILS NFR BLD MANUAL: 56 % (ref 42.7–76)
NEUTS BAND NFR BLD MANUAL: 5 % (ref 0–5)
NRBC SPEC MANUAL: 9 /100 WBC (ref 0–0.2)
PLAT MORPH BLD: NORMAL
PLATELET # BLD AUTO: 152 10*3/MM3 (ref 140–450)
PMV BLD AUTO: 11.2 FL (ref 6–12)
POTASSIUM SERPL-SCNC: 5.3 MMOL/L (ref 3.5–5.2)
PROMYELOCYTES NFR BLD MANUAL: 2 % (ref 0–0)
QT INTERVAL: 332 MS
QT INTERVAL: 352 MS
QTC INTERVAL: 450 MS
QTC INTERVAL: 465 MS
RBC # BLD AUTO: 3.35 10*6/MM3 (ref 3.77–5.28)
RBC MORPH BLD: NORMAL
SODIUM SERPL-SCNC: 138 MMOL/L (ref 136–145)
VARIANT LYMPHS NFR BLD MANUAL: 2 % (ref 0–5)
VARIANT LYMPHS NFR BLD MANUAL: 26 % (ref 19.6–45.3)
WBC MORPH BLD: NORMAL
WBC NRBC COR # BLD AUTO: 26.36 10*3/MM3 (ref 3.4–10.8)

## 2024-03-25 PROCEDURE — 85025 COMPLETE CBC W/AUTO DIFF WBC: CPT | Performed by: INTERNAL MEDICINE

## 2024-03-25 PROCEDURE — 83735 ASSAY OF MAGNESIUM: CPT | Performed by: NURSE PRACTITIONER

## 2024-03-25 PROCEDURE — 82948 REAGENT STRIP/BLOOD GLUCOSE: CPT | Performed by: INTERNAL MEDICINE

## 2024-03-25 PROCEDURE — 85007 BL SMEAR W/DIFF WBC COUNT: CPT | Performed by: INTERNAL MEDICINE

## 2024-03-25 PROCEDURE — 63710000001 INSULIN LISPRO (HUMAN) PER 5 UNITS: Performed by: STUDENT IN AN ORGANIZED HEALTH CARE EDUCATION/TRAINING PROGRAM

## 2024-03-25 PROCEDURE — 63710000001 INSULIN LISPRO (HUMAN) PER 5 UNITS: Performed by: INTERNAL MEDICINE

## 2024-03-25 PROCEDURE — 80048 BASIC METABOLIC PNL TOTAL CA: CPT | Performed by: INTERNAL MEDICINE

## 2024-03-25 PROCEDURE — 99232 SBSQ HOSP IP/OBS MODERATE 35: CPT | Performed by: INTERNAL MEDICINE

## 2024-03-25 RX ORDER — METOPROLOL SUCCINATE 25 MG/1
25 TABLET, EXTENDED RELEASE ORAL EVERY 12 HOURS SCHEDULED
Qty: 60 TABLET | Refills: 0 | Status: SHIPPED | OUTPATIENT
Start: 2024-03-25 | End: 2024-04-24

## 2024-03-25 RX ORDER — INSULIN LISPRO 100 [IU]/ML
6 INJECTION, SOLUTION INTRAVENOUS; SUBCUTANEOUS
Status: DISCONTINUED | OUTPATIENT
Start: 2024-03-25 | End: 2024-03-25 | Stop reason: HOSPADM

## 2024-03-25 RX ORDER — LOSARTAN POTASSIUM 25 MG/1
25 TABLET ORAL
Qty: 30 TABLET | Refills: 0 | Status: SHIPPED | OUTPATIENT
Start: 2024-03-26 | End: 2024-04-25

## 2024-03-25 RX ADMIN — GABAPENTIN 800 MG: 400 CAPSULE ORAL at 13:15

## 2024-03-25 RX ADMIN — AMOXICILLIN AND CLAVULANATE POTASSIUM 1 TABLET: 875; 125 TABLET, FILM COATED ORAL at 08:25

## 2024-03-25 RX ADMIN — FUROSEMIDE 40 MG: 40 TABLET ORAL at 08:25

## 2024-03-25 RX ADMIN — Medication 10 ML: at 08:26

## 2024-03-25 RX ADMIN — LOSARTAN POTASSIUM 25 MG: 25 TABLET, FILM COATED ORAL at 08:25

## 2024-03-25 RX ADMIN — EMPAGLIFLOZIN 10 MG: 10 TABLET, FILM COATED ORAL at 08:25

## 2024-03-25 RX ADMIN — INSULIN LISPRO 5 UNITS: 100 INJECTION, SOLUTION INTRAVENOUS; SUBCUTANEOUS at 08:26

## 2024-03-25 RX ADMIN — METOPROLOL SUCCINATE 25 MG: 25 TABLET, EXTENDED RELEASE ORAL at 08:25

## 2024-03-25 RX ADMIN — GABAPENTIN 800 MG: 400 CAPSULE ORAL at 06:14

## 2024-03-25 NOTE — DISCHARGE SUMMARY
"[facility] Medicine Services  Discharge Summary    Date of Service: 3/25/2024  Patient Name: Nieves Mc  : 1975  MRN: [id number]    Date of Admission: 3/19/2024  Discharge Diagnosis: Multifocal pneumonia, acute on chronic CHF,  Date of Discharge: 3/25/2024  Primary Care Physician: Pankaj Stover MD      Presenting Problem:   Hypoxia [R09.02]  Bilateral lower extremity edema [R60.0]  Pneumonia of right lung due to infectious organism, unspecified part of lung [J18.9]  Multifocal pneumonia [J18.9]  Anemia, unspecified type [D64.9]  Acute on chronic heart failure with preserved ejection fraction (HFpEF) [I50.33]    Active and Resolved Hospital Problems:  Active Hospital Problems    Diagnosis POA    Acute on chronic heart failure with preserved ejection fraction (HFpEF) [I50.33] Yes    Bilateral lower extremity edema [R60.0] Yes    Acute on chronic HFrEF (heart failure with reduced ejection fraction) [I50.23] Yes    Anemia due to chemotherapy [D64.81, T45.1X5A] Yes    NICM (nonischemic cardiomyopathy) [I42.8] Yes    Type 2 diabetes mellitus with diabetic polyneuropathy, with long-term current use of insulin [E11.42, Z79.4] Not Applicable    Medically noncompliant [Z91.199] Not Applicable    CML (chronic myelocytic leukemia) [C92.10] Yes      Resolved Hospital Problems    Diagnosis POA    **Multifocal pneumonia [J18.9] Yes    Acute respiratory failure with hypoxia [J96.01] Yes         Hospital Course     HPI:  Per the H&P written by Mi Cruz MD  , dated 3/19/2024:  \" Nieves Mc is a 48 y.o. female with a PMH of CML on chemotherapy, diabetes, pulmonary embolism and combined CHF who presented to [facility] on 3/19/2024 with shortness of breath. She had blood work done today with a hemoglobin of 6.5 and was sent over to ambulatory to receive a unit of blood per her oncologist, Dr. Lerma. Patient's O2 saturation was 85% on room air while at ambulatory.  She was also " "tachypneic.  Patient was sent over to the ED for further evaluation.  Per patient, she has had shortness of breath for the past couple of days.  Denies any fever or chills at home.  Stated that she started coughing today.  She reports some chest tightness. No hemoptysis. No abdominal pain but does report her abdomen feels bloated and states that she feels like she is retaining water. She has some lower extremity swelling as well. No headache lightheadedness or dizziness.      In the ED, patient was tachycardic.  Labs remarkable for WBC 35.96, H&H 7.9/27.6 (previous earlier 6.5/22.2), D-dimer 1.23, sodium 133, blood glucose 451, , total bilirubin 1.9, BNP 12,315, troponin 29.  Chest x-ray shows bilateral airspace disease concerning for multifocal pneumonia and pulmonary edema.  CTA chest showed extensive right upper lobe and to a lesser degree right lower lobe pneumonia\"    Hospital Course:  The patient was admitted to the hospital for multifocal pneumonia.  The patient initially started on cefepime and azithromycin then switched to Augmentin.  Tested negative for MRSA and vancomycin discontinued.  Blood culture was no growth.  The patient completed 4 days of Augmentin with clinical improvement.  The patient had hyperkalemia and hypomagnesemia at that resolved.  The patient was having heart failure with reduced ejection fraction but updated echo showed normal ejection fraction cardio consulted and recommended Toprol, Jardiance and losartan, the patient was already on Farxiga.  The patient is known CML and she will follow with her oncologist.  The patient has PICC line and her oncologist was contacted and recommended that the patient to follow with the cancer center.  The patient is agreeable and she will call.        DISCHARGE Follow Up Recommendations for labs and diagnostics: Follow-up with your primary care doctor, cardiology and oncologist      Reasons For Change In Medications and Indications for New " Medications:      Day of Discharge     Vital Signs:  Temp:  [97.8 °F (36.6 °C)-99 °F (37.2 °C)] 98 °F (36.7 °C)  Heart Rate:  [90-96] 90  Resp:  [10-15] 10  BP: (112-126)/(69-80) 122/80  Flow (L/min):  [1] 1    Physical Exam:  Physical Exam   Constitutional:      Normal appearance.   HENT:      Head: Normocephalic and atraumatic.      Mouth/Throat:      Mouth: Mucous membranes are moist.   Cardiovascular:      Rate and Rhythm: Normal rate and regular rhythm.   Pulmonary:      Effort: Pulmonary effort is normal.   Diminished breath sounds  Abdominal:      General: Bowel sounds are normal.      Palpations: Abdomen is soft.      Tenderness: There is no abdominal tenderness.  There is a little bruise in the left lower abdomen.  Patient said it is due to injection  Skin:     General: Skin is warm and dry.   Neurological:      General: No focal deficit present.      Mental Status: She is alert and oriented to person, place, and time.   Psychiatric:      Normal affect      Pertinent  and/or Most Recent Results     LAB RESULTS:      Lab 03/25/24  0445 03/24/24  0440 03/23/24  0259 03/22/24  0531 03/21/24  0151 03/20/24  0332 03/19/24  1851 03/19/24  1841 03/19/24  1521 03/19/24  1121   WBC 26.36* 31.35* 25.27* 22.71* 33.43*   < >  --   --  35.96* 31.94*   HEMOGLOBIN 9.1* 9.4* 9.5* 7.1* 7.5*   < >  --    < > 7.9* 6.5*   HEMATOCRIT 30.6* 31.3* 31.4* 24.2* 25.2*   < >  --    < > 27.6* 22.2*   PLATELETS 152 183 141 165 209   < >  --   --  209 188   NEUTROS ABS 16.08* 15.99*  --   --   --   --   --   --  18.34* 15.97*   EOS ABS 0.26 1.25*  --   --   --   --   --   --  0.36  --    MCV 91.3 90.7 89.7 90.3 89.4   < >  --   --  92.3 88.8   LACTATE  --   --   --   --   --   --  <0.3*  --   --   --     < > = values in this interval not displayed.         Lab 03/25/24  0445 03/24/24  0440 03/23/24  0259 03/22/24  0531 03/21/24  0151 03/20/24  1217 03/20/24  0851 03/20/24  0332   SODIUM 138 142 138 140 141  --    < >  --    POTASSIUM  5.3* 5.2 5.7* 4.9 4.4  --    < >  --    CHLORIDE 100 101 103 105 105  --    < >  --    CO2 29.0 28.0 26.0 26.0 25.0  --    < >  --    ANION GAP 9.0 13.0 9.0 9.0 11.0  --    < >  --    BUN 22* 26* 17 13 19  --    < >  --    CREATININE 0.79 1.15* 0.80 0.65 0.84  --    < >  --    EGFR 92.4 58.9* 91.0 108.8 85.8  --    < >  --    GLUCOSE 308* 221* 383* 291* 210*  --    < >  --    CALCIUM 8.5* 9.1 8.9 8.3* 8.6  --    < >  --    IONIZED CALCIUM  --   --   --   --   --  1.10*  --   --    MAGNESIUM 2.2 2.3 1.7 1.7 1.9  --   --   --    HEMOGLOBIN A1C  --   --   --   --   --   --   --  9.70*    < > = values in this interval not displayed.         Lab 03/19/24  1521   TOTAL PROTEIN 7.1   ALBUMIN 3.6   GLOBULIN 3.5   ALT (SGPT) 17   AST (SGOT) 20   BILIRUBIN 1.9*   ALK PHOS 607*         Lab 03/20/24  1217 03/20/24  0851 03/19/24  1521   PROBNP  --   --  12,315.0*   HSTROP T 35* 34* 29*         Lab 03/19/24  1521   CHOLESTEROL 181   LDL CHOL 96   HDL CHOL 55   TRIGLYCERIDES 178*         Lab 03/22/24  1540 03/19/24  1121   ABO TYPING A A   RH TYPING Positive Positive   ANTIBODY SCREEN Negative Negative         Brief Urine Lab Results  (Last result in the past 365 days)        Color   Clarity   Blood   Leuk Est   Nitrite   Protein   CREAT   Urine HCG        07/06/23 2014 Dark Yellow   Cloudy   Negative   Moderate (2+)   Negative   >=300 mg/dL (3+)                 Microbiology Results (last 10 days)       Procedure Component Value - Date/Time    MRSA Screen, PCR (Inpatient) - Swab, Nares [[id number]]  (Normal) Collected: 03/20/24 1218    Lab Status: Final result Specimen: Swab from Nares Updated: 03/20/24 1650     MRSA PCR No MRSA Detected    Narrative:      The negative predictive value of this diagnostic test is high and should only be used to consider de-escalating anti-MRSA therapy. A positive result may indicate colonization with MRSA and must be correlated clinically.    Blood Culture - Blood, Arm, Right [[id number]]  (Normal)  Collected: 03/19/24 1841    Lab Status: Final result Specimen: Blood from Arm, Right Updated: 03/24/24 1845     Blood Culture No growth at 5 days    Narrative:      Less than seven (7) mL's of blood was collected.  Insufficient quantity may yield false negative results.    Blood Culture - Blood, Blood, Central Line [[id number]]  (Normal) Collected: 03/19/24 1841    Lab Status: Final result Specimen: Blood, Central Line Updated: 03/24/24 1845     Blood Culture No growth at 5 days    Narrative:      Less than seven (7) mL's of blood was collected.  Insufficient quantity may yield false negative results.    Respiratory Panel PCR w/COVID-19(SARS-CoV-2) ZEYAD/EUGENIA/[facility]/PAD/COR/CARRIE In-House, NP Swab in UTM/VTM, 2 HR TAT - Swab, Nasopharynx [[id number]]  (Normal) Collected: 03/19/24 1521    Lab Status: Final result Specimen: Swab from Nasopharynx Updated: 03/19/24 1628     ADENOVIRUS, PCR Not Detected     Coronavirus 229E Not Detected     Coronavirus HKU1 Not Detected     Coronavirus NL63 Not Detected     Coronavirus OC43 Not Detected     COVID19 Not Detected     Human Metapneumovirus Not Detected     Human Rhinovirus/Enterovirus Not Detected     Influenza A PCR Not Detected     Influenza B PCR Not Detected     Parainfluenza Virus 1 Not Detected     Parainfluenza Virus 2 Not Detected     Parainfluenza Virus 3 Not Detected     Parainfluenza Virus 4 Not Detected     RSV, PCR Not Detected     Bordetella pertussis pcr Not Detected     Bordetella parapertussis PCR Not Detected     Chlamydophila pneumoniae PCR Not Detected     Mycoplasma pneumo by PCR Not Detected    Narrative:      In the setting of a positive respiratory panel with a viral infection PLUS a negative procalcitonin without other underlying concern for bacterial infection, consider observing off antibiotics or discontinuation of antibiotics and continue supportive care. If the respiratory panel is positive for atypical bacterial infection (Bordetella pertussis,  Chlamydophila pneumoniae, or Mycoplasma pneumoniae), consider antibiotic de-escalation to target atypical bacterial infection.            CT Angiogram Chest Pulmonary Embolism    Result Date: 3/19/2024  Impression: Impression: 1. No acute pulmonary embolic disease. 2. Volume loss particular right side. The right hemidiaphragm is markedly elevated. This could be secondary to diaphragmatic paralysis. 3. Extensive right upper lobe and to a lesser degree right lower lobe pneumonia. There is also abnormal groundglass density and interstitial thickening. I would favor multifocal pneumonia possibly from an atypical infection. 4. Trace right pleural effusion. 5. Hepatosplenomegaly. Electronically Signed: Austen Sage MD  3/19/2024 6:02 PM EDT  Workstation ID: [id number]    XR Chest 1 View    Result Date: 3/19/2024  Impression: Impression: 1. Low lung volumes. 2. Cardiomegaly. 3. Bilateral airspace disease which could be due to multifocal pneumonia or pulmonary edema. Electronically Signed: Austen Sage MD  3/19/2024 3:49 PM EDT  Workstation ID: [id number]    NM Bone Scan Whole Body    Result Date: 3/11/2024  Impression: Impression: No scintigraphic evidence of osseous metastatic disease. Degenerative pattern of uptake within the knees, left greater than right. Electronically Signed: Heather Kelsey MD  3/11/2024 10:06 AM EDT  Workstation ID: [id number]     Results for orders placed during the hospital encounter of 11/03/22    Duplex Venous Lower Extremity - Bilateral CAR    Interpretation Summary    Chronic right lower extremity superficial thrombophlebitis noted in the small saphenous.    All other veins appeared normal bilaterally.      Results for orders placed during the hospital encounter of 11/03/22    Duplex Venous Lower Extremity - Bilateral CAR    Interpretation Summary    Chronic right lower extremity superficial thrombophlebitis noted in the small saphenous.    All other veins appeared normal bilaterally.      Results for  orders placed during the hospital encounter of 03/19/24    Adult Transthoracic Echo Complete W/ Cont if Necessary Per Protocol    Interpretation Summary    Left ventricular systolic function is normal. Calculated left ventricular EF = 62%    Left ventricular diastolic function was normal.    The right ventricular cavity is mildly dilated.    Estimated right ventricular systolic pressure from tricuspid regurgitation is mildly elevated (35-45 mmHg).    The inferior vena cava is normally sized. Normal IVC inspiratory collapse of greater than 50% noted.      Labs Pending at Discharge:      Procedures Performed           Consults:   Consults       Date and Time Order Name Status Description    3/20/2024  4:54 AM Inpatient Cardiology Consult Completed     3/19/2024  8:44 PM Inpatient Hematology & Oncology Consult Completed     3/19/2024  6:43 PM Hospitalist (on-call MD unless specified)                Discharge Details        Discharge Medications        New Medications        Instructions Start Date   losartan 25 MG tablet  Commonly known as: COZAAR   25 mg, Oral, Every 24 Hours Scheduled   Start Date: March 26, 2024     metoprolol succinate XL 25 MG 24 hr tablet  Commonly known as: TOPROL-XL   25 mg, Oral, Every 12 Hours Scheduled             Continue These Medications        Instructions Start Date   acetaminophen 325 MG tablet  Commonly known as: TYLENOL   650 mg, Oral, Every 4 Hours PRN      ALPRAZolam 0.5 MG tablet  Commonly known as: Xanax   0.5 mg, Oral, Nightly PRN      cetirizine 10 MG tablet  Commonly known as: zyrTEC   10 mg, Oral, Daily      Farxiga 10 MG tablet  Generic drug: dapagliflozin Propanediol   Take 10 mg (1 tablet) by mouth Daily. Dx code: E11.65      FreeStyle Zahira 2 Sensor misc   1 each, Does not apply, 4 Times Daily Before Meals & Nightly, Dx code: E11.65      furosemide 40 MG tablet  Commonly known as: LASIX   1 tablet, Oral, Daily      HumaLOG KwikPen 100 UNIT/ML solution  pen-injector  Generic drug: Insulin Lispro (1 Unit Dial)   Inject 7 Units under the skin into the appropriate area as directed 3 (Three) Times a Day Before Meals. Dx code: E11.65      Iclusig 10 MG tablet  Generic drug: PONATinib HCl   10 mg, Oral, Every Other Day, Take with or without food.  Swallow whole, do not crush or chew.      Lantus SoloStar 100 UNIT/ML injection pen  Generic drug: Insulin Glargine   Inject 25 Units under the skin into the appropriate area as directed Every Night. Dx code: E11.65      mirtazapine 15 MG tablet  Commonly known as: REMERON   15 mg, Oral, Every Night at Bedtime      Neurontin 800 MG tablet  Generic drug: gabapentin   800 mg, Oral, 3 Times Daily, Ordered through [facility]      pain patient supplied pump   Intrathecal, Continuous      Reglan 10 MG tablet  Generic drug: metoclopramide   10 mg, Oral, 4 Times Daily      tiZANidine 4 MG tablet  Commonly known as: ZANAFLEX   1 tablet, Oral, Daily      Unifine Pentips 32G X 4 MM misc  Generic drug: Insulin Pen Needle   1 each, Does not apply, 4 Times Daily Before Meals & Nightly, Dx code: E11.65               No Known Allergies      Discharge Disposition: Home with family      Diet:  Hospital:  Diet Order   Procedures    Diet: Cardiac, Diabetic; Healthy Heart (2-3 Na+); Consistent Carbohydrate; Texture: Regular (IDDSI 7); Fluid Consistency: Thin (IDDSI 0)         Discharge Activity:         CODE STATUS:  Code Status and Medical Interventions:   Ordered at: 03/19/24 2044     Code Status (Patient has no pulse and is not breathing):    CPR (Attempt to Resuscitate)     Medical Interventions (Patient has pulse or is breathing):    Full Support         Future Appointments   Date Time Provider Department [place]   3/26/2024  1:00 PM Pankaj Stover MD [facility]   3/29/2024 12:45 PM Meghann Lerma MD [facility]   3/29/2024 12:45 PM [facility] PORT CHAIR [facility]   5/9/2024  3:45 PM Pankaj Stover MD [facility]  [facility]           Time spent on Discharge including face to face service:  >30 minutes    Signature: Electronically signed by Obdulia Yanez MD, 03/25/24, 15:10 EDT.  [facility] [place]

## 2024-03-25 NOTE — PROGRESS NOTES
Hematology/Oncology Inpatient Progress Note    PATIENT NAME: Nieves Mc  : 1975  MRN: [id number]    CHIEF COMPLAINT: Hypoxia    HISTORY OF PRESENT ILLNESS:      Nieves Mc is a 48 y.o. female with past medical history of CML on treatment, diabetes, pulmonary embolism on anticoagulation and combined CHF, who presented to [facility] on 3/19/2024 with complaints of as of breath.  Patient was sent to ambulatory care due to hemoglobin of 6.5 g/dL.  Patient's O2 saturation was 85% on room air while at ambulatory care.  She is also noted to be tachypneic.  Patient was sent to the ED for further evaluation.  Per patient, she has had shortness of breath for the past 2 to 3 days.  Denies fever or chills.  She also reports new cough and chest tightness.  Denies hemoptysis, abdominal pain.  She is also noted lower extremity swelling.     In ED, patient was tachycardic.  Labs remarkable for WBC 35.96, hemoglobin 0.9 g/dL, D-dimer 1.23, blood glucose 451, BNP 12,315, troponin 29.  Chest x-ray with low lung volumes and cardiomegaly.  Bilateral airspace disease which could be due to multifocal pneumonia or pulmonary edema.     CT angiogram chest:  Impression:  1. No acute pulmonary embolic disease.  2. Volume loss particular right side. The right hemidiaphragm is markedly elevated. This could be secondary to diaphragmatic paralysis.  3. Extensive right upper lobe and to a lesser degree right lower lobe pneumonia. There is also abnormal groundglass density and interstitial thickening. I would favor multifocal pneumonia possibly from an atypical infection.  4. Trace right pleural effusion.  5. Hepatosplenomegaly.     24  Hematology/Oncology was consulted. She is established with Dr. Lerma for CML. She recently switched treatments from dasatnib to ponatinib. She reports she started on Monday 3/18/24 (dosing is 10mg every other day).      Diagnosis of CML for many years noncompliant with medicines.   "Patient was receiving blood transfusion and was noted to be hypoxic she was then sent to the emergency room for further evaluation.  In the emergency room she had a CT scan of the chest which showed no acute PE but she had extensive right upper lobe right lower lobe pneumonia with groundglass opacification.  Patient was currently placed on ponatinib.  She has been admitted to the hospital for IV antibiotics, respiratory care.     He/She  has a past medical history of Acute respiratory failure with hypoxia (07/28/2022), Adverse effect of chemotherapy (11/08/2023), Bilateral subdural hematomas (05/18/2023), Bone pain, Chronic constipation (07/07/2023), CML (chronic myelocytic leukemia) (04/01/2018), COVID-19 virus infection (01/12/2022), Diabetes mellitus, Diabetic gastroparesis (07/07/2023), Extremity pain, Fall during current hospitalization (06/25/2023), Leg pain, Medically noncompliant (03/11/2021), Migraine, Petechial rash (11/08/2023), Pubic ramus fracture, left, closed, initial encounter (06/25/2023), Pulmonary embolism, Traumatic subdural hemorrhage without loss of consciousness (05/17/2023), 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     PCP: Provider, No Known    Subjective     Patient with no new issues.  She is down to 1 L of oxygen.  There is plans to possibly discharge today.    ROS:  Review of Systems   Constitutional:  Positive for fatigue.   Respiratory:  Positive for cough and shortness of breath.    Neurological:  Positive for weakness.        MEDICATIONS:    Scheduled Meds:       Continuous Infusions:  No current facility-administered medications for this encounter.     PRN Meds:       ALLERGIES:  No Known Allergies    Objective    VITALS:   /80 (BP Location: Right arm, Patient Position: Lying)   Pulse 90   Temp 98 °F (36.7 °C) (Oral)   Resp 10   Ht 160 cm (63\")   Wt 75.2 kg (165 lb 12.6 oz)   LMP 05/25/2022 (Approximate)   SpO2 95%  "  BMI 29.37 kg/m²     PHYSICAL EXAM: (performed by MD)  Physical Exam  Vitals and nursing note reviewed.   Constitutional:       General: She is not in acute distress.     Appearance: She is not diaphoretic.   HENT:      Head: Normocephalic and atraumatic.   Eyes:      General: No scleral icterus.        Right eye: No discharge.         Left eye: No discharge.      Conjunctiva/sclera: Conjunctivae normal.   Neck:      Thyroid: No thyromegaly.   Cardiovascular:      Rate and Rhythm: Normal rate and regular rhythm.      Heart sounds: Normal heart sounds.      No friction rub. No gallop.   Pulmonary:      Effort: Pulmonary effort is normal. No respiratory distress.      Breath sounds: No stridor. Rhonchi present. No wheezing.   Abdominal:      General: Bowel sounds are normal.      Palpations: Abdomen is soft. There is no mass.      Tenderness: There is no abdominal tenderness. There is no guarding or rebound.   Musculoskeletal:         General: No tenderness. Normal range of motion.      Cervical back: Normal range of motion and neck supple.   Lymphadenopathy:      Cervical: No cervical adenopathy.   Skin:     General: Skin is warm.      Findings: No erythema or rash.   Neurological:      Mental Status: She is alert and oriented to person, place, and time.      Motor: No abnormal muscle tone.   Psychiatric:         Behavior: Behavior normal.       I have reexamined the patient and the results are consistent with the previously documented exam. Meghann Lerma MD      RECENT LABS:    Lab Results (last 24 hours)       Procedure Component Value Units Date/Time    POC Glucose Finger 4x Daily Before Meals & at Bedtime [[id number]]  (Abnormal) Collected: 03/25/24 1132    Specimen: Blood from Finger Updated: 03/25/24 1135     Glucose 128 mg/dL      Comment: Serial Number: [id number]:  [id number]       POC Glucose Finger 4x Daily Before Meals & at Bedtime [[id number]]  (Abnormal) Collected: 03/25/24 0736     Specimen: Blood from Finger Updated: 03/25/24 0739     Glucose 203 mg/dL      Comment: Serial Number: [id number]:  [id number]       Manual Differential [[id number]]  (Abnormal) Collected: 03/25/24 0445    Specimen: Blood Updated: 03/25/24 0602     Neutrophil % 56.0 %      Lymphocyte % 26.0 %      Monocyte % 2.0 %      Eosinophil % 1.0 %      Bands %  5.0 %      Metamyelocyte % 1.0 %      Myelocyte % 4.0 %      Promyelocyte % 2.0 %      Atypical Lymphocyte % 2.0 %      Blasts % 1.0 %      Neutrophils Absolute 16.08 10*3/mm3      Lymphocytes Absolute 7.38 10*3/mm3      Monocytes Absolute 0.53 10*3/mm3      Eosinophils Absolute 0.26 10*3/mm3      nRBC 9.0 /100 WBC      RBC Morphology Normal     WBC Morphology Normal     Platelet Morphology Normal    Narrative:      Reviewed by Pathologist within the past 30 days on 3/13/24 .      CBC & Differential [[id number]]  (Abnormal) Collected: 03/25/24 0445    Specimen: Blood Updated: 03/25/24 0602    Narrative:      The following orders were created for panel order CBC & Differential.  Procedure                               Abnormality         Status                     ---------                               -----------         ------                     CBC Auto Differential[[id number]]        Abnormal            Final result                 Please view results for these tests on the individual orders.    CBC Auto Differential [[id number]]  (Abnormal) Collected: 03/25/24 0445    Specimen: Blood Updated: 03/25/24 0602     WBC 26.36 10*3/mm3      RBC 3.35 10*6/mm3      Hemoglobin 9.1 g/dL      Hematocrit 30.6 %      MCV 91.3 fL      MCH 27.2 pg      MCHC 29.7 g/dL      RDW 16.9 %      RDW-SD 56.4 fl      MPV 11.2 fL      Platelets 152 10*3/mm3     Magnesium [[id number]]  (Normal) Collected: 03/25/24 0445    Specimen: Blood Updated: 03/25/24 0532     Magnesium 2.2 mg/dL     Basic Metabolic Panel [[id number]]  (Abnormal) Collected: 03/25/24 0445    Specimen: Blood Updated:  03/25/24 0532     Glucose 308 mg/dL      BUN 22 mg/dL      Creatinine 0.79 mg/dL      Sodium 138 mmol/L      Potassium 5.3 mmol/L      Comment: Slight hemolysis detected by analyzer. Result may be falsely elevated.        Chloride 100 mmol/L      CO2 29.0 mmol/L      Calcium 8.5 mg/dL      BUN/Creatinine Ratio 27.8     Anion Gap 9.0 mmol/L      eGFR 92.4 mL/min/1.73     Narrative:      GFR Normal >60  Chronic Kidney Disease <60  Kidney Failure <15      POC Glucose Once [[id number]]  (Abnormal) Collected: 03/24/24 1944    Specimen: Blood Updated: 03/24/24 2011     Glucose 407 mg/dL      Comment: Serial Number: [id number]:  [id number]       Blood Culture - Blood, Arm, Right [[id number]]  (Normal) Collected: 03/19/24 1841    Specimen: Blood from Arm, Right Updated: 03/24/24 1845     Blood Culture No growth at 5 days    Narrative:      Less than seven (7) mL's of blood was collected.  Insufficient quantity may yield false negative results.    Blood Culture - Blood, Blood, Central Line [[id number]]  (Normal) Collected: 03/19/24 1841    Specimen: Blood, Central Line Updated: 03/24/24 1845     Blood Culture No growth at 5 days    Narrative:      Less than seven (7) mL's of blood was collected.  Insufficient quantity may yield false negative results.    POC Glucose Once [[id number]]  (Abnormal) Collected: 03/24/24 1634    Specimen: Blood Updated: 03/24/24 1635     Glucose 327 mg/dL      Comment: Serial Number: [id number]:  [id number]               PENDING RESULTS:     IMAGING REVIEWED:  No radiology results for the last day    Assessment & Plan     ASSESSMENT:    CML: She was initially treated with imatinib with no significant response. She was then treated with nilotinib, but discontinued due to noncompliance. Patient was on ponatinib but developed significant pancytopenia. Dasatinib (Sprycel) was initiated in October 2023, but subsequently discontinued due to poor tolerance. She was willing to retry ponatinib and  this was restarted 10 mg every other day.  Will restart ponatinib in the outpatient setting after discharge  Normocytic anemia: continue to monitor CBC and supportive transfusions as needed. Transfuse today for hgb of 7.1g/dl  Multifocal pneumonia: Patient remains on antibiotics.  She is now on 1 L of oxygen and continues to wean.  Cultures remain negative  Leukocytosis: Acute increase likely secondary to multifocal pneumonia.  Also CML contributing  Acute respiratory failure with hypoxia: Likely multifactorial, secondary to pneumonia, CHF. Currently on oxygen 1L per nasal cannula. Imaging of chest with no evidence of PE.     PLANS    Okay to hold ponatinib with acute infection  Daily CBCs  Plan to keep her PICC line.  Will consider transitioning to port in the future  Supportive care per primary team  Discussed with patient            Electronically signed by Meghann Lerma MD, 03/26/24, 7:45 AM EDT.

## 2024-03-25 NOTE — PLAN OF CARE
Goal Outcome Evaluation:  Plan of Care Reviewed With: patient        Progress: no change  Outcome Evaluation: Patient slept between care with no complaints. Family at bedside.

## 2024-03-25 NOTE — OUTREACH NOTE
Prep Survey      Flowsheet Row Responses   [facility]   Is LACE score < 7 ? No   Eligibility [facility]   Date of Admission 03/19/24   Date of Discharge 03/25/24   Discharge Disposition Home or Self Care   Discharge diagnosis Multifocal pneumonia/CHF   Does the patient have one of the following disease processes/diagnoses(primary or secondary)? CHF   Does the patient have Home health ordered? No   Is there a DME ordered? No   Prep survey completed? Yes            JESSIE A - Registered Nurse

## 2024-03-25 NOTE — PLAN OF CARE
Goal Outcome Evaluation:      Patient discharging home with family

## 2024-03-25 NOTE — CASE MANAGEMENT/SOCIAL WORK
Continued Stay Note  ZOHAIB Amor     Patient Name: Nieves Mc  MRN: [id number]  Today's Date: 3/25/2024    Admit Date: 3/19/2024    Plan: Return home with family. Has caregiver through [employer] 8-4pm daily. Unable to obtain [facility] for PICC care/ weekly labs- pt will have to go to [facility] for weekly line care and labs.   Discharge Plan       Row Name 03/25/24 1538       Plan    Plan Return home with family. Has caregiver through [employer] 8-4pm daily. Unable to obtain [facility] for PICC care/ weekly labs- pt will have to go to [facility] for weekly line care and labs.    Plan Comments DC barriers: elevated potassium, elevated blood sugars. Cardiology and oncology following. CM unable to get C agencies to accept pt. Dr Lerma messaged through secure chat, and recommends that pt go to [facility] for weekly line care and labs. Pt informed.                 Elsa Lawson RN      Office phone: [phone]  Office fax: [phone]

## 2024-03-26 ENCOUNTER — TRANSITIONAL CARE MANAGEMENT TELEPHONE ENCOUNTER (OUTPATIENT)
Dept: CALL CENTER | Facility: HOSPITAL | Age: 49
End: 2024-03-26
Payer: MEDICARE

## 2024-03-26 ENCOUNTER — TELEPHONE (OUTPATIENT)
Dept: DIABETES SERVICES | Facility: HOSPITAL | Age: 49
End: 2024-03-26
Payer: MEDICARE

## 2024-03-26 NOTE — OUTREACH NOTE
Call Center TCM Note      Flowsheet Row Responses   [facility] facility patient discharged from? [facility]   Does the patient have one of the following disease processes/diagnoses(primary or secondary)? CHF   TCM attempt successful? No   Unsuccessful attempts Attempt 1   Call Status Left message             Micaela Manning MA    3/26/2024, 13:40 EDT

## 2024-03-26 NOTE — OUTREACH NOTE
Call Center TCM Note      Flowsheet Row Responses   [facility] facility patient discharged from? [facility]   Does the patient have one of the following disease processes/diagnoses(primary or secondary)? CHF   TCM attempt successful? No   Unsuccessful attempts Attempt 2             Micaela Manning MA    3/26/2024, 16:15 EDT

## 2024-03-26 NOTE — CASE MANAGEMENT/SOCIAL WORK
Case Management Discharge Note      Final Note: Routine home         Selected Continued Care - Discharged on 3/25/2024 Admission date: 3/19/2024 - Discharge disposition: Home or Self Care             Transportation Services  Private: Car    Final Discharge Disposition Code: 01 - home or self-care

## 2024-03-27 ENCOUNTER — TELEPHONE (OUTPATIENT)
Dept: DIABETES SERVICES | Facility: HOSPITAL | Age: 49
End: 2024-03-27
Payer: MEDICARE

## 2024-03-27 ENCOUNTER — TRANSITIONAL CARE MANAGEMENT TELEPHONE ENCOUNTER (OUTPATIENT)
Dept: CALL CENTER | Facility: HOSPITAL | Age: 49
End: 2024-03-27
Payer: MEDICARE

## 2024-03-27 ENCOUNTER — TELEPHONE (OUTPATIENT)
Dept: ONCOLOGY | Facility: CLINIC | Age: 49
End: 2024-03-27
Payer: MEDICARE

## 2024-03-27 ENCOUNTER — TELEPHONE (OUTPATIENT)
Dept: ONCOLOGY | Facility: CLINIC | Age: 49
End: 2024-03-27

## 2024-03-27 RX ORDER — FUROSEMIDE 40 MG/1
40 TABLET ORAL DAILY
Qty: 30 TABLET | Refills: 1 | OUTPATIENT
Start: 2024-03-27

## 2024-03-27 RX ORDER — METOCLOPRAMIDE 10 MG/1
10 TABLET ORAL 4 TIMES DAILY
Qty: 120 TABLET | Refills: 1 | OUTPATIENT
Start: 2024-03-27

## 2024-03-27 RX ORDER — TIZANIDINE 4 MG/1
4 TABLET ORAL DAILY
Qty: 30 TABLET | Refills: 1 | OUTPATIENT
Start: 2024-03-27

## 2024-03-27 NOTE — OUTREACH NOTE
Call Center TCM Note      Flowsheet Row Responses   [facility] patient discharged from? [facility]   Does the patient have one of the following disease processes/diagnoses(primary or secondary)? CHF   TCM attempt successful? Yes   Call start time 0856   Call end time 0901   Discharge diagnosis Multifocal pneumonia/CHF   Meds reviewed with patient/caregiver? Yes   Is the patient having any side effects they believe may be caused by any medication additions or changes? No   Does the patient have all medications ordered at discharge? Yes   Prescription comments New rx's Losartan, Metoprolol succinate XL in place.   Is the patient taking all medications as directed (includes completed medication regime)? Yes   Does the patient have an appointment with their PCP within 7-14 days of discharge? No   Nursing Interventions Routed TCM call to PCP office   Has home health visited the patient within 72 hours of discharge? Unsure   Home health comments [facility] may resume following pt, order pending   Pulse Ox monitoring None   Psychosocial issues? No   Did the patient receive a copy of their discharge instructions? Yes   Nursing interventions Reviewed instructions with patient   What is the patient's perception of their health status since discharge? Improving   If the patient is a current smoker, are they able to teach back resources for cessation? Not a smoker   Is the patient/caregiver able to teach back the hierarchy of who to call/visit for symptoms/problems? PCP, Specialist, Home health nurse, Urgent Care, ED, 911 Yes   TCM call completed? Yes   Wrap up additional comments D/C DX: Multifocal pneumonia/CHF - Pt states she is managing. Pt declines my reaching out to CM. SW or anyone for assist but she is upset she was told at d/c HH not approved, when [facility] has been following her for PICC line care and labs for awhile. Pt has spoken with [facility] today and they are looking into issue. Pt currently has no PCP and is  sched as NP with Dr Stover in May. CM made her appt with this PCP for yesterday but she states she was unaware. Apparently PCP appt also made with MD through [facility]. Unclear if she is keeping this appt. Pt states she cannot does not have transportation to go weekly outside of home for PICC line and labs. Pt does have two paid caregivers, one or the other is with her daily M-F 8am-430pm. Pt is closely followed by HEM/ONC office.   Call end time 0901             Micaela Manning MA    3/27/2024, 09:30 EDT

## 2024-03-27 NOTE — TELEPHONE ENCOUNTER
OSW called pt and informed that [facility] christel will pick her up at noon on 3/29 for her 12:45p appt at [facility].  Tex v/u.

## 2024-03-27 NOTE — TELEPHONE ENCOUNTER
Follow-up call to patient to see how doing with diabetes care at home. Patient stated there were not any prescriptions sent for a new meter or Zahira 2 sensors. Patient stated that she has an appointment with a new PCP on April 2nd. Instructed patient to ask for prescriptions for Zahira 2 sensors and a new glucometer with testing supplies. Discussed with patient about the ReliOn glucometer but patient stated she would have to wait until she got her check on the 1st. Patient has no further questions or concerns related to diabetes at this time.

## 2024-03-27 NOTE — TELEPHONE ENCOUNTER
Caller: Nieves Mc A    Relationship: Self    Best call back number: [phone]    What is the best time to reach you: ANYTIME    Who are you requesting to speak with (clinical staff, provider,  specific staff member):     What was the call regarding: PATIENT CALLING TO SEE WHAT TIME THE TRANSPORTATION WILL BE ARRANGED TO PICK HER UP FOR HER 3/29 APPT. PLEASE CALL TO ADVISE.

## 2024-03-28 ENCOUNTER — SPECIALTY PHARMACY (OUTPATIENT)
Dept: PHARMACY | Facility: HOSPITAL | Age: 49
End: 2024-03-28
Payer: MEDICARE

## 2024-03-29 ENCOUNTER — HOSPITAL ENCOUNTER (OUTPATIENT)
Dept: ONCOLOGY | Facility: HOSPITAL | Age: 49
Discharge: HOME | End: 2024-03-29
Payer: MEDICARE

## 2024-03-29 ENCOUNTER — OFFICE VISIT (OUTPATIENT)
Dept: ONCOLOGY | Facility: CLINIC | Age: 49
End: 2024-03-29
Payer: MEDICARE

## 2024-03-29 VITALS
DIASTOLIC BLOOD PRESSURE: 77 MMHG | TEMPERATURE: 98 F | WEIGHT: 165 LBS | HEIGHT: 63 IN | SYSTOLIC BLOOD PRESSURE: 119 MMHG | BODY MASS INDEX: 29.23 KG/M2 | HEART RATE: 96 BPM | RESPIRATION RATE: 18 BRPM | OXYGEN SATURATION: 95 %

## 2024-03-29 DIAGNOSIS — F41.8 DEPRESSION WITH ANXIETY: ICD-10-CM

## 2024-03-29 DIAGNOSIS — C92.10 CML (CHRONIC MYELOCYTIC LEUKEMIA): Primary | ICD-10-CM

## 2024-03-29 DIAGNOSIS — C92.10 CML (CHRONIC MYELOCYTIC LEUKEMIA): ICD-10-CM

## 2024-03-29 DIAGNOSIS — Z45.2 PICC (PERIPHERALLY INSERTED CENTRAL CATHETER) FLUSH: Primary | ICD-10-CM

## 2024-03-29 LAB
BASOPHILS NFR BLD AUTO: ABNORMAL %
DEPRECATED RDW RBC AUTO: 56 FL (ref 37–54)
EOSINOPHIL # BLD AUTO: 0.33 10*3/MM3 (ref 0–0.4)
EOSINOPHIL NFR BLD AUTO: 1.4 % (ref 0.3–6.2)
ERYTHROCYTE [DISTWIDTH] IN BLOOD BY AUTOMATED COUNT: 17.6 % (ref 12.3–15.4)
HCT VFR BLD AUTO: 34 % (ref 34–46.6)
HGB BLD-MCNC: 10.3 G/DL (ref 12–15.9)
LYMPHOCYTES # BLD AUTO: 3.48 10*3/MM3 (ref 0.7–3.1)
LYMPHOCYTES NFR BLD AUTO: 14.3 % (ref 19.6–45.3)
MCH RBC QN AUTO: 27.8 PG (ref 26.6–33)
MCHC RBC AUTO-ENTMCNC: 30.3 G/DL (ref 31.5–35.7)
MCV RBC AUTO: 91.9 FL (ref 79–97)
MONOCYTES # BLD AUTO: 1.47 10*3/MM3 (ref 0.1–0.9)
MONOCYTES NFR BLD AUTO: 6 % (ref 5–12)
NEUTROPHILS NFR BLD AUTO: ABNORMAL %
PLATELET # BLD AUTO: 111 10*3/MM3 (ref 140–450)
PMV BLD AUTO: 10.8 FL (ref 6–12)
RBC # BLD AUTO: 3.7 10*6/MM3 (ref 3.77–5.28)
WBC NRBC COR # BLD AUTO: 24.3 10*3/MM3 (ref 3.4–10.8)

## 2024-03-29 PROCEDURE — 25010000002 ALTEPLASE 2 MG RECONSTITUTED SOLUTION: Performed by: INTERNAL MEDICINE

## 2024-03-29 PROCEDURE — 85025 COMPLETE CBC W/AUTO DIFF WBC: CPT | Performed by: INTERNAL MEDICINE

## 2024-03-29 PROCEDURE — 36592 COLLECT BLOOD FROM PICC: CPT

## 2024-03-29 PROCEDURE — 36593 DECLOT VASCULAR DEVICE: CPT

## 2024-03-29 RX ORDER — SODIUM CHLORIDE 0.9 % (FLUSH) 0.9 %
20 SYRINGE (ML) INJECTION AS NEEDED
Status: DISCONTINUED | OUTPATIENT
Start: 2024-03-29 | End: 2024-03-30 | Stop reason: HOSPADM

## 2024-03-29 RX ORDER — SODIUM CHLORIDE 0.9 % (FLUSH) 0.9 %
20 SYRINGE (ML) INJECTION AS NEEDED
OUTPATIENT
Start: 2024-03-29

## 2024-03-29 RX ADMIN — Medication 20 ML: at 13:35

## 2024-03-29 RX ADMIN — ALTEPLASE: 2.2 INJECTION, POWDER, LYOPHILIZED, FOR SOLUTION INTRAVENOUS at 13:35

## 2024-03-29 NOTE — PROGRESS NOTES
PICC line dressing changed with sterile technique and clean/dry/intact. Stat lock secure and left in place. Border dressing and antimicrobial disc replaced. PICC flushed and blood return noted but PICC is positional.

## 2024-03-29 NOTE — PROGRESS NOTES
Hematology/Oncology Outpatient Follow Up    PATIENT NAME:Nieves Mc  :1975  MRN: [id number]  PRIMARY CARE PHYSICIAN: Pankaj Stover MD  REFERRING PHYSICIAN: Provider, No Known    Chief Complaint   Patient presents with    Follow-up     CML (chronic myelocytic leukemia)        HISTORY OF PRESENT ILLNESS:         Patient is a 48 y.o. female with PMH significant for CML on treatment with Imatinib.  Patient stated that she typically feels poorly with Imatinib with frequent nausea and vomiting.  Also complained of weakness and fatigue.  Patient presented to ED on 10/22/18 with complaints of an elevated blood sugar around 400.  Stated she felt poorly and has had a productive cough with yellow sputum.  Complained of nausea and stated she was unable to keep any food down anytime she ate.  The patient reported subjective fever without chills.  She stated she had been coughing so hard that she was vomiting.  She denied hematemesis.  Denied diarrhea, constipation or blood in her stool.       Patient’s chest x-ray showed no evidence of acute pulmonary embolus.  The patient has ground-glass opacities throughout the lungs.  There is some air trapping noted which would appear to be   infectious.  Patient was started on antibiotics.      Hem/Onc was consulted on 10/23/18 for co-management of patient with CML.  Patient was seen by Dr. Lerma on 10/12 on previous admission for patient reported CML on Imatinib (Gleevec).  Patient reports she is under the care of Dr. Roach at [facility] in [place].  Patient was seen by Dr. Lerma in May 2018 when patient presented with hematuria, which was attributed to her Imatinib.  Dr. Lerma followed during hospitalization but then patient returned to Dr. Roach for her usual follow up.  She was again seen on 10/12.  Patient is stating she will switch to Dr. Lerma.    Review of Dr. Roach’s note:  On 18 patient had BCR-abl which was 61.326.  Patient had been on  Imatinib 400 mg daily.  She had presented in March 2018 with WBC of 24, hemoglobin 13.1, platelet count of 1,067,000.  Patient apparently had follow up BCR-abl in August 2018, results are not available for review.  Her bone marrow aspiration and biopsy was also performed at that time is currently not available for review.    11/6/18 - .  Uric acid 6.5.  BCR-abl by RT-PCR was measured at 3.36%.    Due to thrombocytosis, patient was placed on Hydroxyurea 500 mg twice a day on 12/11/18.  Platelet count juana to 900,000.  Patient was noncompliant with CBCs that were recommended.    Patient was asked to return to the office after not being able to reach her.   12/27/18 - Bone marrow aspiration and biopsy was consistent with chronic myeloid leukemia.  BCR-abl positive, chronic phase.  ABL kinase mutational analysis is currently pending on the bone marrow.    1/3/19 - Repeat CBC, WBC 17, hemoglobin 11.9, platelet count 1,072,000.  Hydroxyurea was increased to 1 gm twice a day with follow up CBC.    1/6/19 - Follow up CBC showed progressive increase in platelet to 1.1 million.  Patient was offered admission to the hospital, which she declined.  Hydroxyurea was increased to 1 gm three   times a day as of 1/6/19.   1/7/19 - EKG shows sinus tachycardia.   milliseconds.    ABL kinase on peripheral blood was ordered on 1/11/19.  Based on sequence analysis, there was no mutation detected.    2/12/19 - Patient was initiated on Tasigna 300 mg twice a day.  2/13/19 - Urinalysis was negative for hematuria.   2/22/19 -  milliseconds.  3/13/19 - EKG with QTC is 413 milliseconds.  Nonspecific changes noted.    4/18/19 - EKG showed QTC prolonged to 453 milliseconds.  This is changed from prior.  4/18/19 - Free T4 normal at 0.91.  Magnesium was 1.7.  BUN is 6.  Creatinine 0.7.  Bilirubin 2.2.  Phosphorous normal 3.7.  TSH 0.43, normal.  Free T3 was normal at 3.47.  Ionized calcium   normal at 1.23.  BCR-abl was 0.522%.     5/7/19 - EKG showed QTC of 441 milliseconds.  QT was 366.     Patient has been lost to follow-up since 2019 for CML.  Patient states that she lost her insurance.  She also does not have any primary care physician at this time.  She is diabetic on insulin.  Patient was recently admitted to the hospital for pneumonia due to COVID-19 infection.  At that time patient made a decision to become compliant with treatment.  She is currently on to [employer]a 300 mg p.o. twice a day.  She is also on hydroxyurea 1 g twice a day.  Overall she feels better, she has more energy.  3/1/2022 white count is 53.92, hemoglobin 11.7 and platelets are 472    6/1/2022: Patient has not been seen since March 2022.  Also verified that she has not been taking to Tasigna since February 2022.  She comes in today with cough for 1 and half months, shortness of breath, denies fevers.  6/30/2022 patient had 2D echocardiogram which showed an EF of 41 to 45%.  Left ventricular cavity is mildly dilated.  She was diagnosed with nonischemic cardiomyopathy    11/18/2022: Patient was transferred from [facility] due to cardiac failure.  She was then seen by NP [facility] hematology department.  Patient underwent tumor aspiration and biopsy at that time did not show chronic myeloid leukemia in accelerated phase markedly hypercellular marrow with megakaryocytic and myeloid hyperplasia with atypia and increased basophils blasts are not increased less than 1% moderate reticulin fibrosis.  According to the patient hydroxyurea was discontinued she was prescribed Gleevec 600 mg daily but she has only been taking 400 mg as she cannot find other milligram tablets.  She is already been pump inserted into her pelvic area.  She continues to experience nausea which resulted in her not taking the baclofen she should.  1/12/2023: WBC 17.64, hemoglobin 10.2, MCV 88.8, platelets 458,000.  On Gleevec 600 mg daily and hydroxyurea 500 mg  twice daily.  2023: WBC 10.67, hemoglobin 10.0, MCV 86.4, platelets 370,000.  Patient on Gleevec 600 mg daily and hydroxyurea 500 mg once a day.  Patient instructed at the visit to discontinue her hydroxyurea.  2023: WBC 17.75, hemoglobin 10.4, MCV 87.2, platelets 425,000.  On Gleevec 600 mg daily.  3/10/2023: 2D echocardiogram showing EF of 44% EKG showing sinus tachycardia QTc of 491  10/5/2023: Patient was initiated on Dasatinib 100 mg p.o. daily  2023-2023: Patient hospitalized at [facility] due to rash that was thought to be secondary to Sprycel and elevated glucose.  Sprycel was held and patient was treated with a 5-day course of prednisone.  2023: WBC 3.10, hemoglobin 8.2, MCV 90.3, platelets 214,000, ANC 1100.    Past Medical History:   Diagnosis Date    Acute respiratory failure with hypoxia 2022    Adverse effect of chemotherapy 2023    Bilateral subdural hematomas 2023    Bone pain     Chronic constipation 2023    CML (chronic myelocytic leukemia) 2018    COVID-19 virus infection 2022    Diabetes mellitus     Diabetic gastroparesis 2023    Extremity pain     Carlin. legs pain    Fall during current hospitalization 2023    Leg pain     left leg greater    Medically noncompliant 2021    Migraine     Petechial rash 2023    Pubic ramus fracture, left, closed, initial encounter 2023    Pulmonary embolism     Traumatic subdural hemorrhage without loss of consciousness 2023    Tumor lysis syndrome 2022    UTI (urinary tract infection) 2023    Vision loss     doing surgery       Past Surgical History:   Procedure Laterality Date    BONE MARROW BIOPSY      BREAST SURGERY      BRONCHOSCOPY N/A 2022    Procedure: BRONCHOSCOPY bilateral lung washing;  Surgeon: Charlene Camara MD;  Location: [facility] ENDOSCOPY;  Service: Pulmonary;  Laterality: N/A;  post: rule out infection vs transfusion lung injury     SECTION       CHOLECYSTECTOMY      COLONOSCOPY N/A 6/29/2023    Procedure: COLONOSCOPY;  Surgeon: Freddy Villeda MD;  Location: [facility] ENDOSCOPY;  Service: Gastroenterology;  Laterality: N/A;  poor prep    ENDOSCOPY N/A 6/29/2023    Procedure: ESOPHAGOGASTRODUODENOSCOPY with biopsy x2 areas;  Surgeon: Freddy Villeda MD;  Location: [facility] ENDOSCOPY;  Service: Gastroenterology;  Laterality: N/A;  food in stomach;abnormal duodenal mucosa    EYE SURGERY      laser surgery due  to hemmorage--- 5/13/2021-- another surgery  lt eye11/15/21    RETINAL DETACHMENT SURGERY      SPINE SURGERY      Lombardi spinal block    TUBAL ABDOMINAL LIGATION           Current Outpatient Medications:     acetaminophen (TYLENOL) 325 MG tablet, Take 2 tablets by mouth Every 4 (Four) Hours As Needed for Moderate Pain. Indications: Fever, Pain, Disp: 60 tablet, Rfl: 3    ALPRAZolam (Xanax) 0.5 MG tablet, Take 1 tablet by mouth At Night As Needed for Anxiety. Indications: Feeling Anxious, Disp: 30 tablet, Rfl: 0    cetirizine (zyrTEC) 10 MG tablet, Take 1 tablet by mouth Daily., Disp: 30 tablet, Rfl: 0    Continuous Blood Gluc Sensor (FreeStyle Zahira 2 Sensor) misc, Use 1 each 4 (Four) Times a Day Before Meals & at Bedtime. Dx code: E11.65 (Patient not taking: Reported on 2/27/2024), Disp: 2 each, Rfl: 2    dapagliflozin Propanediol (Farxiga) 10 MG tablet, Take 10 mg (1 tablet) by mouth Daily. Dx code: E11.65, Disp: 30 tablet, Rfl: 2    furosemide (LASIX) 40 MG tablet, Take 1 tablet by mouth Daily., Disp: , Rfl:     gabapentin (Neurontin) 800 MG tablet, Take 1 tablet by mouth 3 (Three) Times a Day. Ordered through [facility]  Indications: Diabetes with Nerve Disease, Disp: , Rfl:     Insulin Glargine (LANTUS SOLOSTAR) 100 UNIT/ML injection pen, Inject 25 Units under the skin into the appropriate area as directed Every Night. Dx code: E11.65, Disp: 15 mL, Rfl: 2    Insulin Lispro, 1 Unit Dial, (HumaLOG KwikPen) 100 UNIT/ML solution pen-injector, Inject  7 Units under the skin into the appropriate area as directed 3 (Three) Times a Day Before Meals. Dx code: E11.65, Disp: 15 mL, Rfl: 2    Insulin Pen Needle (Pen Needles) 32G X 4 MM misc, Use 1 each 4 (Four) Times a Day Before Meals & at Bedtime. Dx code: E11.65, Disp: 200 each, Rfl: 2    losartan (COZAAR) 25 MG tablet, Take 1 tablet by mouth Daily for 30 days., Disp: 30 tablet, Rfl: 0    metoclopramide (Reglan) 10 MG tablet, Take 1 tablet by mouth 4 (Four) Times a Day., Disp: , Rfl:     metoprolol succinate XL (TOPROL-XL) 25 MG 24 hr tablet, Take 1 tablet by mouth Every 12 (Twelve) Hours for 30 days., Disp: 60 tablet, Rfl: 0    mirtazapine (REMERON) 15 MG tablet, TAKE 1 TABLET BY MOUTH EVERY NIGHT AT BEDTIME. INDICATIONS: MAJOR DEPRESSIVE DISORDER (Patient taking differently: Take 1 tablet by mouth every night at bedtime.), Disp: 90 tablet, Rfl: 3    pain patient supplied pump, by Intrathecal route Continuous., Disp: , Rfl:     PONATinib HCl (Iclusig) 10 MG tablet, Take 10 mg by mouth Every Other Day. Take with or without food.  Swallow whole, do not crush or chew., Disp: 30 tablet, Rfl: 2    tiZANidine (ZANAFLEX) 4 MG tablet, Take 1 tablet by mouth Daily. Indications: Musculoskeletal Pain, Disp: , Rfl:   No current facility-administered medications for this visit.    Facility-Administered Medications Ordered in Other Visits:     acetaminophen (TYLENOL) tablet 650 mg, 650 mg, Oral, Once, Denisha Gill APRN    alteplase (CATHFLO/ACTIVASE) 2 MG injection  - ADS Override Pull, , , ,     sodium chloride 0.9 % flush 20 mL, 20 mL, Intravenous, PRN, Meghann Lerma MD, 20 mL at 03/29/24 1335    No Known Allergies    Family History   Problem Relation Age of Onset    Diabetes Mother     Diabetes Maternal Grandmother     Heart attack Maternal Grandmother     Stroke Maternal Grandmother        Cancer-related family history is not on file.    Social History     Tobacco Use    Smoking status: Never    Smokeless  tobacco: Never   Vaping Use    Vaping status: Never Used   Substance Use Topics    Alcohol use: No    Drug use: No       I have reviewed and confirmed the accuracy of the patient's history: Chief complaint, HPI, ROS, and Subjective as entered by the MA/LPN/RN. Meghann Lerma MD 03/29/24        SUBJECTIVE:    Nieves Mc reports a pain score of 8.  Given her pain assessment as noted, treatment options were discussed and the following options were decided upon as a follow-up plan to address the patient's pain: continuation of current treatment plan for pain    .   Continues to have significant GI issues.  She has been referred to GI office but has not made appointment yet.  She is not taking Sprycel regularly.    Here today for follow-up.  Posthospitalization for pneumonia.  She denies any new issues.  She is back on ponatinib    Nieves Mc reports a pain score of 8.  Given her pain assessment as noted, treatment options were discussed and the following options were decided upon as a follow-up plan to address the patient's pain: continuation of current treatment plan for pain. Patient is established with pain management.     REVIEW OF SYSTEMS:    Review of Systems   Constitutional: Negative for chills and fever.   HENT: Negative for ear pain, mouth sores, nosebleeds and sore throat.    Eyes: Negative for photophobia and visual disturbance.   Respiratory: Negative for wheezing and stridor.    Cardiovascular: Negative for chest pain and palpitations.   Gastrointestinal: Negative for abdominal pain, diarrhea,.  She has nausea  Endocrine: Negative for cold intolerance and heat intolerance.   Genitourinary: Negative for dysuria and hematuria.   Musculoskeletal: Negative for joint swelling and neck stiffness.  Positive for back and leg pain.   Skin: Negative for color change and rash.   Neurological: Negative for seizures and syncope.   Hematological: Negative for adenopathy.        No obvious bleeding  "  Psychiatric/Behavioral: Negative for agitation, confusion and hallucinations. Positive for insomnia, anxiety, depression.      OBJECTIVE:    Vitals:    03/29/24 1252   BP: 119/77   Pulse: 96   Resp: 18   Temp: 98 °F (36.7 °C)   TempSrc: Infrared   SpO2: 95%   Weight: 74.8 kg (165 lb)   Height: 160 cm (63\")   PainSc:   8   PainLoc: Generalized                     Body mass index is 29.23 kg/m².    ECOG  (1) Restricted in physically strenuous activity, ambulatory and able to do work of light nature    Physical Exam  Vitals and nursing note reviewed.   Constitutional:       General: She is not in acute distress.     Appearance: She is not diaphoretic.   HENT:      Head: Normocephalic and atraumatic.   Eyes:      General: No scleral icterus.        Right eye: No discharge.         Left eye: No discharge.      Conjunctiva/sclera: Conjunctivae normal.   Neck:      Thyroid: No thyromegaly.   Cardiovascular:      Rate and Rhythm: Normal rate and regular rhythm.      Heart sounds: Normal heart sounds. No friction rub. No gallop.    Pulmonary:      Effort: Pulmonary effort is normal. No respiratory distress.      Breath sounds: No stridor. No wheezing.   Abdominal:      General: Bowel sounds are normal.      Palpations: Abdomen is soft. There is no mass.      Tenderness: There is no abdominal tenderness. There is no guarding or rebound.   Musculoskeletal:         General: No tenderness. Normal range of motion.      Cervical back: Normal range of motion and neck supple.   Lymphadenopathy:      Cervical: No cervical adenopathy.   Skin:     General: Skin is warm.      Findings: No erythema or rash.   Neurological:      Mental Status: She is alert and oriented to person, place, and time.      Motor: No abnormal muscle tone.   Psychiatric:         Behavior: Behavior    I have reexamined the patient and the results are consistent with the previously documented exam. Meghann Lerma MD      RECENT LABS    WBC   Date Value " Ref Range Status   03/29/2024 24.30 (H) 3.40 - 10.80 10*3/mm3 Final     RBC   Date Value Ref Range Status   03/29/2024 3.70 (L) 3.77 - 5.28 10*6/mm3 Final     Hemoglobin   Date Value Ref Range Status   03/29/2024 10.3 (L) 12.0 - 15.9 g/dL Final     Hematocrit   Date Value Ref Range Status   03/29/2024 34.0 34.0 - 46.6 % Final     MCV   Date Value Ref Range Status   03/29/2024 91.9 79.0 - 97.0 fL Final     MCH   Date Value Ref Range Status   03/29/2024 27.8 26.6 - 33.0 pg Final     MCHC   Date Value Ref Range Status   03/29/2024 30.3 (L) 31.5 - 35.7 g/dL Final     RDW   Date Value Ref Range Status   03/29/2024 17.6 (H) 12.3 - 15.4 % Final     RDW-SD   Date Value Ref Range Status   03/29/2024 56.0 (H) 37.0 - 54.0 fl Final     MPV   Date Value Ref Range Status   03/29/2024 10.8 6.0 - 12.0 fL Final     Platelets   Date Value Ref Range Status   03/29/2024 111 (L) 140 - 450 10*3/mm3 Final     Neutrophil %   Date Value Ref Range Status   03/04/2024 69.8 42.7 - 76.0 % Final     Lymphocyte %   Date Value Ref Range Status   03/29/2024 14.3 (L) 19.6 - 45.3 % Final     Monocyte %   Date Value Ref Range Status   03/29/2024 6.0 5.0 - 12.0 % Final     Eosinophil %   Date Value Ref Range Status   03/29/2024 1.4 0.3 - 6.2 % Final     Basophil %   Date Value Ref Range Status   03/04/2024 3.9 (H) 0.0 - 1.5 % Final     Neutrophils Absolute   Date Value Ref Range Status   03/25/2024 16.08 (H) 1.70 - 7.00 10*3/mm3 Final     External Neutrophils Abs   Date Value Ref Range Status   11/30/2022 7.6 (H) 1.7 - 6.0 x10(3)/ul Final     Neutrophils, Absolute   Date Value Ref Range Status   03/04/2024 12.11 (H) 1.70 - 7.00 10*3/mm3 Final     Lymphocytes, Absolute   Date Value Ref Range Status   03/29/2024 3.48 (H) 0.70 - 3.10 10*3/mm3 Final     Monocytes, Absolute   Date Value Ref Range Status   03/29/2024 1.47 (H) 0.10 - 0.90 10*3/mm3 Final     Eosinophils, Absolute   Date Value Ref Range Status   03/29/2024 0.33 0.00 - 0.40 10*3/mm3 Final      Basophils Absolute   Date Value Ref Range Status   03/24/2024 1.25 (H) 0.00 - 0.20 10*3/mm3 Final     Basophils, Absolute   Date Value Ref Range Status   03/04/2024 0.67 (H) 0.00 - 0.20 10*3/mm3 Final     nRBC   Date Value Ref Range Status   03/25/2024 9.0 (H) 0.0 - 0.2 /100 WBC Final   12/04/2023 0.3 (H) 0.0 - 0.2 /100 WBC Final       Lab Results   Component Value Date    GLUCOSE 308 (H) 03/25/2024    BUN 22 (H) 03/25/2024    CREATININE 0.79 03/25/2024    EGFRIFNONA 133 02/02/2022    BCR 27.8 (H) 03/25/2024    K 5.3 (H) 03/25/2024    CO2 29.0 03/25/2024    CALCIUM 8.5 (L) 03/25/2024    ALBUMIN 3.6 03/19/2024    LABIL2 1.0 04/18/2019    AST 20 03/19/2024    ALT 17 03/19/2024           ASSESSMENT    Accelerated phase CML, status post bone marrow biopsy on 11/3/2022.  Patient was on ponatinib but developed significant pancytopenia and therefore treatment has been held.  Patient was not able to tolerate ponatinib despite significantly low doses at 10 mg every other day.  She is not now willing to retry this.  Will therefore restart ponatinib 10 mg every other day.  Continue ponatinib  Discontinue Sprycel due to lack of tolerance  Recent pneumonia  Hypercalcemia likely secondary to malignancy  PICC line care.  Consider transitioning to port down the line  Nausea vomiting secondary to Sprycel, likely she also has gastroparesis from longstanding uncontrolled diabetes: Will refer to GI for evaluation  Pain management issues, patient initially refusing to follow-up with her physician at [facility].  Patient has been seen by her pain physician.  She will continue follow-up with our pain physicians  Pancytopenia: Reviewed her CBC  BCR ABL kinase mutation assay was negative  Cardiomyopathy her most recent echo shows an EF of 44% which is an improvement from 26 to 30%.  Patient encouraged to follow-up with her cardiologist  Anxiety: Continue anxiolytics  Pancytopenia secondary to med, CML not  requiring blood transfusion now.  For now continue to observe   CML in chronic phase with no significant hematologic or molecular or cytogenetic response on   Imatinib.  ABL kinase mutational analysis was negative.  We  have represcribed to Tasigna 300 mg twice a day.  Patient has been noncompliant with treatments and ended up in the hospital with hyperleukocytosis, peripheral blood blasts.  Bone marrow biopsy suggest that she remains in chronic phase.  Continue to Tasiigna at the current doses.  Hydroxyurea has been discontinued.  Uncontrolled diabetes type 1, followed at the [facility] by Dr. Calles  Chronic anemia: Stable, multifactorial from CML, to Tasigna, hydroxyurea.  This has improved  Insomnia  Situational anxiety, not suicidal.  Continue Xanax 0.5 mg once daily  History of medical noncompliance  Pulmonary embolism: Xarelto restarted  Recent COVID-19 pneumonia  History of prolonged QTC      Plans    Continue Ponatinib 10 mg every other day  Bone scan ordered  Continue PICC line care  Weekly labs  Continue Phenergan for nausea.  Change Zofran to 4 mg every 8 hours as needed for nausea to decrease risk of prolonged QT interval.    Follow-up with NP in 2 weeks and see me in 4 weeks l  Request additional records from [facility]  DC trazodone 25 mg due to QT prolongation  Continue to follow-up with cardiology in regards to dosing of her diuretics.  EKG reviewed.  She has slight prolongation of QT.  We will discontinue Zofran and Phenergan and use Compazine as needed for nausea.  Prescription has been sent to her pharmacy  Refill Xanax  All questions answered  Continue to follow-up with PCP per routine  Follow-up with pain management per routine  All questions answered      I spent 30 total minutes, face-to-face, caring for Hope today. 90% of this time involved counseling and/or coordination of care as documented within this note.

## 2024-04-01 RX ORDER — GABAPENTIN 800 MG/1
800 TABLET ORAL 3 TIMES DAILY
Qty: 90 TABLET | Refills: 2 | Status: SHIPPED | OUTPATIENT
Start: 2024-04-01

## 2024-04-01 RX ORDER — MIRTAZAPINE 15 MG/1
15 TABLET, FILM COATED ORAL
Qty: 90 TABLET | Refills: 3 | Status: SHIPPED | OUTPATIENT
Start: 2024-04-01

## 2024-04-01 RX ORDER — TIZANIDINE 4 MG/1
4 TABLET ORAL DAILY
OUTPATIENT
Start: 2024-04-01

## 2024-04-02 ENCOUNTER — SPECIALTY PHARMACY (OUTPATIENT)
Dept: PHARMACY | Facility: HOSPITAL | Age: 49
End: 2024-04-02
Payer: MEDICARE

## 2024-04-04 ENCOUNTER — TELEPHONE (OUTPATIENT)
Dept: ONCOLOGY | Facility: CLINIC | Age: 49
End: 2024-04-04
Payer: MEDICARE

## 2024-04-04 ENCOUNTER — SPECIALTY PHARMACY (OUTPATIENT)
Dept: PHARMACY | Facility: HOSPITAL | Age: 49
End: 2024-04-04
Payer: MEDICARE

## 2024-04-04 ENCOUNTER — READMISSION MANAGEMENT (OUTPATIENT)
Dept: CALL CENTER | Facility: HOSPITAL | Age: 49
End: 2024-04-04
Payer: MEDICARE

## 2024-04-04 DIAGNOSIS — C92.10 CML (CHRONIC MYELOCYTIC LEUKEMIA): Primary | ICD-10-CM

## 2024-04-04 DIAGNOSIS — Z45.2 PICC (PERIPHERALLY INSERTED CENTRAL CATHETER) FLUSH: ICD-10-CM

## 2024-04-04 NOTE — TELEPHONE ENCOUNTER
Caller: KALYANI HOLGUIN        Best call back number: [phone]      What was the call regarding: EROS CALLED STATES JENN IS NEEDING HOME HEALTH ORDERS SENT TO [phone] AND PUT STAFFING ON THE TOP OF THE ORDER

## 2024-04-04 NOTE — OUTREACH NOTE
CHF Week 2 Survey      Flowsheet Row Responses   [facility] patient discharged from? [facility]   Does the patient have one of the following disease processes/diagnoses(primary or secondary)? CHF   Week 2 attempt successful? Yes   Call start time 1634   Call end time 1639   Discharge diagnosis Multifocal pneumonia/CHF   Person spoke with today (if not patient) and relationship Patient   Meds reviewed with patient/caregiver? Yes   Does the patient have all medications ordered at discharge? Yes   Is the patient taking all medications as directed (includes completed medication regime)? Yes   Comments regarding appointments Patient following with Dr Lerma, oncology.   Comments regarding PCP Patient reports sees PCP thru [facility].   Has the patient kept scheduled appointments due by today? Yes  [Patient reports has had follow up with cardiology]   What is the Home health agency?  Patient reports that her HH was cancelled. She states that Dr Lerma and her insurance is working on HH services. Patient has a PICC line that has to have dressing change every 7 days and labs done.   Pulse Ox monitoring None   Psychosocial issues? No   Did the patient receive a copy of their discharge instructions? Yes   Nursing interventions Reviewed instructions with patient   What is the patient's perception of their health status since discharge? Improving   Is the patient able to teach back signs and symptoms of worsening condition? (i.e. weight gain, shortness of air, etc.) Yes   If the patient is a current smoker, are they able to teach back resources for cessation? Not a smoker   Is the patient/caregiver able to teach back the hierarchy of who to call/visit for symptoms/problems? PCP, Specialist, Home health nurse, Urgent Care, ED, 911 Yes   CHF Zone this Call Green Zone   Green Zone No new swelling -  feet, ankles and legs look normal for you   Green Zone Interventions Daily weight check, Meds as directed, Follow up visits planned    CHF Week 2 call completed? Yes   Is the patient interested in additional calls from an ambulatory ? No   Would this patient benefit from a Referral to [facility] Social Work? No   Wrap up additional comments Patient declines for me to contact ambulatory  for assistance with care coordination. She reports she wants to see what Dr Lerma and her insurance does to take care of getting her services arranged.   Call end time [id number]            LISETTE BAIRD - Registered Nurse

## 2024-04-04 NOTE — PROGRESS NOTES
Specialty Pharmacy Note: Iclusig (ponatinib)    Spoke to pt via phone. She continues to take Iclusig (ponatinib) with current dose of 10 mg every other day. She reports missing one dose this week due to stomach upset. She believes she is tolerating this medication better than the Sprycel but would still like to be monitored closely due to previously having severe thrombocytopenia while taking medication (at different/higher dose). Discussed with pt that message regarding request for home health orders have been received and pt should be able to have labs drawn via home health in the future. Pt had no questions or concerns regarding medication at this time.    Thank you,  Amy Aldridge, Pharm.D.

## 2024-04-05 ENCOUNTER — HOME HEALTH ADMISSION (OUTPATIENT)
Dept: HOME HEALTH SERVICES | Facility: HOME HEALTHCARE | Age: 49
End: 2024-04-05
Payer: COMMERCIAL

## 2024-04-09 ENCOUNTER — HOSPITAL ENCOUNTER (OUTPATIENT)
Dept: ONCOLOGY | Facility: HOSPITAL | Age: 49
Discharge: HOME | End: 2024-04-09
Admitting: INTERNAL MEDICINE
Payer: MEDICARE

## 2024-04-09 ENCOUNTER — TELEPHONE (OUTPATIENT)
Dept: ONCOLOGY | Facility: CLINIC | Age: 49
End: 2024-04-09

## 2024-04-09 DIAGNOSIS — Z45.2 PICC (PERIPHERALLY INSERTED CENTRAL CATHETER) FLUSH: Primary | ICD-10-CM

## 2024-04-09 PROCEDURE — G0463 HOSPITAL OUTPT CLINIC VISIT: HCPCS

## 2024-04-09 NOTE — PROGRESS NOTES
Pt to clinic for Picc Line dressing change. Old dressing removed with no issue and site is clean, dry and intact.  New border dressing and biopatch placed. Secure device was still intact and left in place. PICC line was recapped and flushed easily without discomfort but no blood return noted. Patient stated line is very positional. Pt discharged and declined AVS.

## 2024-04-15 NOTE — PROGRESS NOTES
Hematology/Oncology Outpatient Follow Up    PATIENT NAME:Nieves Mc  :1975  MRN: [id number]  PRIMARY CARE PHYSICIAN: Pankaj Stover MD  REFERRING PHYSICIAN: Pankaj Stover MD    Chief Complaint   Patient presents with    Follow-up     Follow up        HISTORY OF PRESENT ILLNESS:         Patient is a 48 y.o. female with PMH significant for CML on treatment with Imatinib.  Patient stated that she typically feels poorly with Imatinib with frequent nausea and vomiting.  Also complained of weakness and fatigue.  Patient presented to ED on 10/22/18 with complaints of an elevated blood sugar around 400.  Stated she felt poorly and has had a productive cough with yellow sputum.  Complained of nausea and stated she was unable to keep any food down anytime she ate.  The patient reported subjective fever without chills.  She stated she had been coughing so hard that she was vomiting.  She denied hematemesis.  Denied diarrhea, constipation or blood in her stool.       Patient’s chest x-ray showed no evidence of acute pulmonary embolus.  The patient has ground-glass opacities throughout the lungs.  There is some air trapping noted which would appear to be   infectious.  Patient was started on antibiotics.      Hem/Onc was consulted on 10/23/18 for co-management of patient with CML.  Patient was seen by Dr. Lerma on 10/12 on previous admission for patient reported CML on Imatinib (Gleevec).  Patient reports she is under the care of Dr. Roach at [facility] in [place].  Patient was seen by Dr. Lerma in May 2018 when patient presented with hematuria, which was attributed to her Imatinib.  Dr. Lerma followed during hospitalization but then patient returned to Dr. Roach for her usual follow up.  She was again seen on 10/12.  Patient is stating she will switch to Dr. Lerma.    Review of Dr. Roach’s note:  On 18 patient had BCR-abl which was 61.326.  Patient had been on Imatinib 400 mg  daily.  She had presented in March 2018 with WBC of 24, hemoglobin 13.1, platelet count of 1,067,000.  Patient apparently had follow up BCR-abl in August 2018, results are not available for review.  Her bone marrow aspiration and biopsy was also performed at that time is currently not available for review.    11/6/18 - .  Uric acid 6.5.  BCR-abl by RT-PCR was measured at 3.36%.    Due to thrombocytosis, patient was placed on Hydroxyurea 500 mg twice a day on 12/11/18.  Platelet count juana to 900,000.  Patient was noncompliant with CBCs that were recommended.    Patient was asked to return to the office after not being able to reach her.   12/27/18 - Bone marrow aspiration and biopsy was consistent with chronic myeloid leukemia.  BCR-abl positive, chronic phase.  ABL kinase mutational analysis is currently pending on the bone marrow.    1/3/19 - Repeat CBC, WBC 17, hemoglobin 11.9, platelet count 1,072,000.  Hydroxyurea was increased to 1 gm twice a day with follow up CBC.    1/6/19 - Follow up CBC showed progressive increase in platelet to 1.1 million.  Patient was offered admission to the hospital, which she declined.  Hydroxyurea was increased to 1 gm three   times a day as of 1/6/19.   1/7/19 - EKG shows sinus tachycardia.   milliseconds.    ABL kinase on peripheral blood was ordered on 1/11/19.  Based on sequence analysis, there was no mutation detected.    2/12/19 - Patient was initiated on Tasigna 300 mg twice a day.  2/13/19 - Urinalysis was negative for hematuria.   2/22/19 -  milliseconds.  3/13/19 - EKG with QTC is 413 milliseconds.  Nonspecific changes noted.    4/18/19 - EKG showed QTC prolonged to 453 milliseconds.  This is changed from prior.  4/18/19 - Free T4 normal at 0.91.  Magnesium was 1.7.  BUN is 6.  Creatinine 0.7.  Bilirubin 2.2.  Phosphorous normal 3.7.  TSH 0.43, normal.  Free T3 was normal at 3.47.  Ionized calcium   normal at 1.23.  BCR-abl was 0.522%.    5/7/19 - EKG  showed QTC of 441 milliseconds.  QT was 366.     Patient has been lost to follow-up since 2019 for CML.  Patient states that she lost her insurance.  She also does not have any primary care physician at this time.  She is diabetic on insulin.  Patient was recently admitted to the hospital for pneumonia due to COVID-19 infection.  At that time patient made a decision to become compliant with treatment.  She is currently on to Cigna 300 mg p.o. twice a day.  She is also on hydroxyurea 1 g twice a day.  Overall she feels better, she has more energy.  3/1/2022 white count is 53.92, hemoglobin 11.7 and platelets are 472    6/1/2022: Patient has not been seen since March 2022.  Also verified that she has not been taking to Tasigna since February 2022.  She comes in today with cough for 1 and half months, shortness of breath, denies fevers.  6/30/2022 patient had 2D echocardiogram which showed an EF of 41 to 45%.  Left ventricular cavity is mildly dilated.  She was diagnosed with nonischemic cardiomyopathy    11/18/2022: Patient was transferred from [facility] to [facility] due to cardiac failure.  She was then seen by NP [facility] hematology department.  Patient underwent tumor aspiration and biopsy at that time did not show chronic myeloid leukemia in accelerated phase markedly hypercellular marrow with megakaryocytic and myeloid hyperplasia with atypia and increased basophils blasts are not increased less than 1% moderate reticulin fibrosis.  According to the patient hydroxyurea was discontinued she was prescribed Gleevec 600 mg daily but she has only been taking 400 mg as she cannot find other milligram tablets.  She is already been pump inserted into her pelvic area.  She continues to experience nausea which resulted in her not taking the baclofen she should.  1/12/2023: WBC 17.64, hemoglobin 10.2, MCV 88.8, platelets 458,000.  On Gleevec 600 mg daily and hydroxyurea 500 mg twice  daily.  2023: WBC 10.67, hemoglobin 10.0, MCV 86.4, platelets 370,000.  Patient on Gleevec 600 mg daily and hydroxyurea 500 mg once a day.  Patient instructed at the visit to discontinue her hydroxyurea.  2023: WBC 17.75, hemoglobin 10.4, MCV 87.2, platelets 425,000.  On Gleevec 600 mg daily.  3/10/2023: 2D echocardiogram showing EF of 44% EKG showing sinus tachycardia QTc of 491  10/5/2023: Patient was initiated on Dasatinib 100 mg p.o. daily  2023-2023: Patient hospitalized at [facility] due to rash that was thought to be secondary to Sprycel and elevated glucose.  Sprycel was held and patient was treated with a 5-day course of prednisone.  2023: WBC 3.10, hemoglobin 8.2, MCV 90.3, platelets 214,000, ANC 1100.    Past Medical History:   Diagnosis Date    Acute respiratory failure with hypoxia 2022    Adverse effect of chemotherapy 2023    Bilateral subdural hematomas 2023    Bone pain     Chronic constipation 2023    CML (chronic myelocytic leukemia) 2018    COVID-19 virus infection 2022    Diabetes mellitus     Diabetic gastroparesis 2023    Extremity pain     Carlin. legs pain    Fall during current hospitalization 2023    Leg pain     left leg greater    Medically noncompliant 2021    Migraine     Petechial rash 2023    Pubic ramus fracture, left, closed, initial encounter 2023    Pulmonary embolism     Traumatic subdural hemorrhage without loss of consciousness 2023    Tumor lysis syndrome 2022    UTI (urinary tract infection) 2023    Vision loss     doing surgery       Past Surgical History:   Procedure Laterality Date    BONE MARROW BIOPSY      BREAST SURGERY      BRONCHOSCOPY N/A 2022    Procedure: BRONCHOSCOPY bilateral lung washing;  Surgeon: Charlene Camara MD;  Location: [facility] ENDOSCOPY;  Service: Pulmonary;  Laterality: N/A;  post: rule out infection vs transfusion lung injury     SECTION       CHOLECYSTECTOMY      COLONOSCOPY N/A 6/29/2023    Procedure: COLONOSCOPY;  Surgeon: Freddy Villeda MD;  Location: [facility] ENDOSCOPY;  Service: Gastroenterology;  Laterality: N/A;  poor prep    ENDOSCOPY N/A 6/29/2023    Procedure: ESOPHAGOGASTRODUODENOSCOPY with biopsy x2 areas;  Surgeon: Freddy Villeda MD;  Location: [facility] ENDOSCOPY;  Service: Gastroenterology;  Laterality: N/A;  food in stomach;abnormal duodenal mucosa    EYE SURGERY      laser surgery due  to hemmorage--- 5/13/2021-- another surgery  lt eye11/15/21    RETINAL DETACHMENT SURGERY      SPINE SURGERY      Lombardi spinal block    TUBAL ABDOMINAL LIGATION           Current Outpatient Medications:     acetaminophen (TYLENOL) 325 MG tablet, Take 2 tablets by mouth Every 4 (Four) Hours As Needed for Moderate Pain. Indications: Fever, Pain, Disp: 60 tablet, Rfl: 3    ALPRAZolam (Xanax) 0.5 MG tablet, Take 1 tablet by mouth At Night As Needed for Anxiety. Indications: Feeling Anxious, Disp: 30 tablet, Rfl: 0    Continuous Blood Gluc Sensor (FreeStyle Zahira 2 Sensor) misc, Use 1 each 4 (Four) Times a Day Before Meals & at Bedtime. Dx code: E11.65, Disp: 2 each, Rfl: 2    dapagliflozin Propanediol (Farxiga) 10 MG tablet, Take 10 mg (1 tablet) by mouth Daily. Dx code: E11.65, Disp: 30 tablet, Rfl: 2    furosemide (LASIX) 40 MG tablet, Take 1 tablet by mouth Daily. Indications: Edema, Disp: 30 tablet, Rfl: 2    gabapentin (Neurontin) 800 MG tablet, Take 1 tablet by mouth 3 (Three) Times a Day. Ordered through [facility]  Indications: Diabetes with Nerve Disease, Disp: 90 tablet, Rfl: 2    Insulin Glargine (LANTUS SOLOSTAR) 100 UNIT/ML injection pen, Inject 25 Units under the skin into the appropriate area as directed Every Night. Dx code: E11.65, Disp: 15 mL, Rfl: 2    Insulin Lispro, 1 Unit Dial, (HumaLOG KwikPen) 100 UNIT/ML solution pen-injector, Inject 7 Units under the skin into the appropriate area as directed 3 (Three) Times a Day Before Meals. Dx  code: E11.65, Disp: 15 mL, Rfl: 2    Insulin Pen Needle (Pen Needles) 32G X 4 MM misc, Use 1 each 4 (Four) Times a Day Before Meals & at Bedtime. Dx code: E11.65, Disp: 200 each, Rfl: 2    losartan (COZAAR) 25 MG tablet, Take 1 tablet by mouth Daily for 30 days., Disp: 30 tablet, Rfl: 0    metoprolol succinate XL (TOPROL-XL) 25 MG 24 hr tablet, Take 1 tablet by mouth Every 12 (Twelve) Hours for 30 days., Disp: 60 tablet, Rfl: 0    mirtazapine (REMERON) 30 MG tablet, Take 1 tablet by mouth every night at bedtime. Indications: Major Depressive Disorder, Disp: 30 tablet, Rfl: 2    pain patient supplied pump, by Intrathecal route Continuous., Disp: , Rfl:     PONATinib HCl (Iclusig) 10 MG tablet, Take 10 mg by mouth Every Other Day. Take with or without food.  Swallow whole, do not crush or chew., Disp: 30 tablet, Rfl: 2    tiZANidine (ZANAFLEX) 4 MG tablet, Take 1 tablet by mouth Daily. Indications: Musculoskeletal Pain, Disp: , Rfl:     cetirizine (zyrTEC) 10 MG tablet, Take 1 tablet by mouth Daily. (Patient not taking: Reported on 4/16/2024), Disp: 30 tablet, Rfl: 0    Diclofenac Sodium (VOLTAREN) 1 % gel gel, Apply 4 g topically to the appropriate area as directed 4 (Four) Times a Day As Needed (hip pain)., Disp: 100 g, Rfl: 2    metoclopramide (Reglan) 10 MG tablet, Take 1 tablet by mouth 4 (Four) Times a Day. (Patient not taking: Reported on 4/16/2024), Disp: , Rfl:   No current facility-administered medications for this visit.    Facility-Administered Medications Ordered in Other Visits:     acetaminophen (TYLENOL) tablet 650 mg, 650 mg, Oral, Once, Denisha Gill APRN    sodium chloride 0.9 % flush 20 mL, 20 mL, Intravenous, PRN, Meghann Lerma MD, 20 mL at 04/16/24 1419    No Known Allergies    Family History   Problem Relation Age of Onset    Diabetes Mother     Diabetes Maternal Grandmother     Heart attack Maternal Grandmother     Stroke Maternal Grandmother        Cancer-related family  history is not on file.    Social History     Tobacco Use    Smoking status: Never    Smokeless tobacco: Never   Vaping Use    Vaping status: Never Used   Substance Use Topics    Alcohol use: No    Drug use: No       I have reviewed and confirmed the accuracy of the patient's history: Chief complaint, HPI, ROS, and Subjective as entered by the MA/LPN/RN. Denisha Beckman Jairo, APRN 04/16/24        SUBJECTIVE:  Nieves is here for her routine follow-up.  She reports that she is taking the ponatinib 10 mg p.o. every other day.  Thus far she is tolerating it without issue.  She does report that she is having increased insomnia.  Bilateral hip pain but mostly on the left.  Increasing swelling in the bilateral lower extremities since her hospital discharge.      Nieves Mc reports a pain score of 7.  Given her pain assessment as noted, treatment options were discussed and the following options were decided upon as a follow-up plan to address the patient's pain: referral to Physical Therapy.   Prescription for Voltaren gel, continue pain management with specialist      .REVIEW OF SYSTEMS:    Review of Systems   Constitutional: Negative for chills and fever.   HENT: Negative for ear pain, mouth sores, nosebleeds and sore throat.    Eyes: Negative for photophobia and visual disturbance.   Respiratory: Negative for wheezing and stridor.    Cardiovascular: Negative for chest pain and palpitations.   Gastrointestinal: Negative for abdominal pain, diarrhea,.  She has nausea  Endocrine: Negative for cold intolerance and heat intolerance.   Genitourinary: Negative for dysuria and hematuria.   Musculoskeletal: Negative for joint swelling and neck stiffness.  Positive for back and leg pain.   Skin: Negative for color change and rash.   Neurological: Negative for seizures and syncope.   Hematological: Negative for adenopathy.        No obvious bleeding   Psychiatric/Behavioral: Negative for agitation, confusion and hallucinations.  "Positive for insomnia, anxiety, depression.      OBJECTIVE:    Vitals:    04/16/24 1352   BP: 131/83   Pulse: 94   Temp: 98 °F (36.7 °C)   TempSrc: Oral   SpO2: 95%   Weight: Comment: Patient would not stand to weigh   Height: 160 cm (63\")   PainSc:   7   PainLoc: Back         Body mass index is 29.23 kg/m².    ECOG  (1) Restricted in physically strenuous activity, ambulatory and able to do work of light nature    Physical Exam  Vitals and nursing note reviewed.   Constitutional:       General: She is not in acute distress.     Appearance: She is not diaphoretic.   HENT:      Head: Normocephalic and atraumatic.   Eyes:      General: No scleral icterus.        Right eye: No discharge.         Left eye: No discharge.      Conjunctiva/sclera: Conjunctivae normal.   Neck:      Thyroid: No thyromegaly.   Cardiovascular:      Rate and Rhythm: Normal rate and regular rhythm.      Heart sounds: Normal heart sounds. No friction rub. No gallop.    Bilateral lower extremity edema, 2+  Pulmonary:      Effort: Pulmonary effort is normal. No respiratory distress.      Breath sounds: No stridor. No wheezing.   Abdominal:      General: Bowel sounds are normal.      Palpations: Abdomen is soft. There is no mass.      Tenderness: There is no abdominal tenderness. There is no guarding or rebound.   Musculoskeletal:         General: No tenderness. Normal range of motion.      Cervical back: Normal range of motion and neck supple.   Lymphadenopathy:      Cervical: No cervical adenopathy.   Skin:     General: Skin is warm.      Findings: No erythema or rash.   Neurological:      Mental Status: She is alert and oriented to person, place, and time.      Motor: No abnormal muscle tone.   Psychiatric:         Behavior: Behavior    I have reexamined the patient and the results are consistent with the previously documented exam. JP Varela      RECENT LABS    WBC   Date Value Ref Range Status   04/16/2024 39.94 (C) 3.40 - 10.80 " 10*3/mm3 Final     RBC   Date Value Ref Range Status   04/16/2024 3.01 (L) 3.77 - 5.28 10*6/mm3 Final     Hemoglobin   Date Value Ref Range Status   04/16/2024 8.3 (L) 12.0 - 15.9 g/dL Final     Hematocrit   Date Value Ref Range Status   04/16/2024 26.5 (L) 34.0 - 46.6 % Final     MCV   Date Value Ref Range Status   04/16/2024 88.0 79.0 - 97.0 fL Final     MCH   Date Value Ref Range Status   04/16/2024 27.6 26.6 - 33.0 pg Final     MCHC   Date Value Ref Range Status   04/16/2024 31.3 (L) 31.5 - 35.7 g/dL Final     RDW   Date Value Ref Range Status   04/16/2024 17.8 (H) 12.3 - 15.4 % Final     RDW-SD   Date Value Ref Range Status   04/16/2024 54.7 (H) 37.0 - 54.0 fl Final     MPV   Date Value Ref Range Status   04/16/2024 10.2 6.0 - 12.0 fL Final     Platelets   Date Value Ref Range Status   04/16/2024 381 140 - 450 10*3/mm3 Final     Neutrophil %   Date Value Ref Range Status   04/16/2024 69.4 42.7 - 76.0 % Final     Lymphocyte %   Date Value Ref Range Status   04/16/2024 18.6 (L) 19.6 - 45.3 % Final     Monocyte %   Date Value Ref Range Status   04/16/2024 9.6 5.0 - 12.0 % Final     Eosinophil %   Date Value Ref Range Status   04/16/2024 0.7 0.3 - 6.2 % Final     Basophil %   Date Value Ref Range Status   04/16/2024 1.7 (H) 0.0 - 1.5 % Final     External Neutrophils Abs   Date Value Ref Range Status   11/30/2022 7.6 (H) 1.7 - 6.0 x10(3)/ul Final     Neutrophils, Absolute   Date Value Ref Range Status   04/16/2024 27.72 (H) 1.70 - 7.00 10*3/mm3 Final     Lymphocytes, Absolute   Date Value Ref Range Status   04/16/2024 7.43 (H) 0.70 - 3.10 10*3/mm3 Final     Monocytes, Absolute   Date Value Ref Range Status   04/16/2024 3.84 (H) 0.10 - 0.90 10*3/mm3 Final     Eosinophils, Absolute   Date Value Ref Range Status   04/16/2024 0.26 0.00 - 0.40 10*3/mm3 Final     Basophils, Absolute   Date Value Ref Range Status   04/16/2024 0.69 (H) 0.00 - 0.20 10*3/mm3 Final     nRBC   Date Value Ref Range Status   03/25/2024 9.0 (H)  0.0 - 0.2 /100 WBC Final   12/04/2023 0.3 (H) 0.0 - 0.2 /100 WBC Final       Lab Results   Component Value Date    GLUCOSE 308 (H) 03/25/2024    BUN 22 (H) 03/25/2024    CREATININE 0.79 03/25/2024    EGFRIFNONA 133 02/02/2022    BCR 27.8 (H) 03/25/2024    K 5.3 (H) 03/25/2024    CO2 29.0 03/25/2024    CALCIUM 8.5 (L) 03/25/2024    ALBUMIN 3.6 03/19/2024    LABIL2 1.0 04/18/2019    AST 20 03/19/2024    ALT 17 03/19/2024           ASSESSMENT    Accelerated phase CML, status post bone marrow biopsy on 11/3/2022.  Patient was on ponatinib but developed significant pancytopenia and therefore treatment has been held.  Patient was not able to tolerate ponatinib despite significantly low doses at 10 mg every other day.  She is not now willing to retry this.  Will therefore restart ponatinib 10 mg every other day.  Continue ponatinib  Discontinue Sprycel due to lack of tolerance  Recent pneumonia  Hypercalcemia likely secondary to malignancy  PICC line care.  Consider transitioning to port down the line  Nausea vomiting secondary to Sprycel, likely she also has gastroparesis from longstanding uncontrolled diabetes: Will refer to GI for evaluation  Pain management issues, patient initially refusing to follow-up with her physician at [facility].  Patient has been seen by her pain physician.  She will continue follow-up with our pain physicians  Pancytopenia: Reviewed her CBC  BCR ABL kinase mutation assay was negative  Cardiomyopathy her most recent echo shows an EF of 44% which is an improvement from 26 to 30%.  Patient encouraged to follow-up with her cardiologist  Anxiety: Continue anxiolytics  Pancytopenia secondary to med, CML not requiring blood transfusion now.  For now continue to observe   CML in chronic phase with no significant hematologic or molecular or cytogenetic response on   Imatinib.  ABL kinase mutational analysis was negative.  We  have represcribed to Tasigna 300 mg twice a day.   Patient has been noncompliant with treatments and ended up in the hospital with hyperleukocytosis, peripheral blood blasts.  Bone marrow biopsy suggest that she remains in chronic phase.  Continue to Tasiigna at the current doses.  Hydroxyurea has been discontinued.  Uncontrolled diabetes type 1, followed at the [facility] by Dr. Calles  Chronic anemia: Stable, multifactorial from CML, to Tasigna, hydroxyurea.  This has improved  Insomnia  Situational anxiety, not suicidal.  Continue Xanax 0.5 mg once daily  History of medical noncompliance  Pulmonary embolism: Xarelto restarted  Recent COVID-19 pneumonia  History of prolonged QTC      Plans    Continue Ponatinib 10 mg every other day.  Patient to meet with the oral treatment pharmacist today.  Bone scan reviewed and negative for any metastatic disease  Continue PICC line care.  Patient to have this done in the office today.  Referral placed to home health.  Weekly labs, reviewed  She was discharged from the hospital on Reglan 10 mg p.o. 4 times a day for nausea  Increase mirtazapine to 30 mg p.o. nightly for depression and insomnia  Home health physical therapy ordered for bilateral hip osteoarthritis, Voltaren gel added  Follow-up with Dr. Lrema in 2 weeks as previously scheduled  Request additional records from [facility] trazodone 25 mg due to QT prolongation  Continue to follow-up with cardiology in regards to dosing of her diuretics.  EKG reviewed.  She has slight prolongation of QT.  We will discontinue Zofran and Phenergan and use Compazine as needed for nausea.  Prescription has been sent to her pharmacy  Refill Xanax today  All questions answered  Continue to follow-up with PCP per routine  Follow-up with pain management per routine  Refilled Lasix per hospital discharge notes.  Has an appointment to establish with PCP on 5/9/2024.  All questions answered      I spent 40 total minutes, face-to-face, caring for  Hope today. 90% of this time involved counseling and/or coordination of care as documented within this note.

## 2024-04-16 ENCOUNTER — DOCUMENTATION (OUTPATIENT)
Dept: ONCOLOGY | Facility: CLINIC | Age: 49
End: 2024-04-16
Payer: MEDICARE

## 2024-04-16 ENCOUNTER — HOSPITAL ENCOUNTER (OUTPATIENT)
Dept: ONCOLOGY | Facility: HOSPITAL | Age: 49
Discharge: HOME | End: 2024-04-16
Admitting: NURSE PRACTITIONER
Payer: MEDICARE

## 2024-04-16 ENCOUNTER — OFFICE VISIT (OUTPATIENT)
Dept: ONCOLOGY | Facility: CLINIC | Age: 49
End: 2024-04-16
Payer: MEDICARE

## 2024-04-16 ENCOUNTER — SPECIALTY PHARMACY (OUTPATIENT)
Dept: PHARMACY | Facility: HOSPITAL | Age: 49
End: 2024-04-16
Payer: MEDICARE

## 2024-04-16 VITALS
OXYGEN SATURATION: 95 % | BODY MASS INDEX: 29.23 KG/M2 | HEIGHT: 63 IN | HEART RATE: 94 BPM | SYSTOLIC BLOOD PRESSURE: 131 MMHG | DIASTOLIC BLOOD PRESSURE: 83 MMHG | TEMPERATURE: 98 F

## 2024-04-16 DIAGNOSIS — C92.10 CML (CHRONIC MYELOCYTIC LEUKEMIA): Primary | ICD-10-CM

## 2024-04-16 DIAGNOSIS — F41.8 DEPRESSION WITH ANXIETY: ICD-10-CM

## 2024-04-16 DIAGNOSIS — M16.0 OSTEOARTHRITIS OF BOTH HIPS, UNSPECIFIED OSTEOARTHRITIS TYPE: ICD-10-CM

## 2024-04-16 DIAGNOSIS — F41.9 ANXIETY: ICD-10-CM

## 2024-04-16 DIAGNOSIS — Z45.2 PICC (PERIPHERALLY INSERTED CENTRAL CATHETER) FLUSH: Primary | ICD-10-CM

## 2024-04-16 LAB
BASOPHILS # BLD AUTO: 0.69 10*3/MM3 (ref 0–0.2)
BASOPHILS NFR BLD AUTO: 1.7 % (ref 0–1.5)
DEPRECATED RDW RBC AUTO: 54.7 FL (ref 37–54)
EOSINOPHIL # BLD AUTO: 0.26 10*3/MM3 (ref 0–0.4)
EOSINOPHIL NFR BLD AUTO: 0.7 % (ref 0.3–6.2)
ERYTHROCYTE [DISTWIDTH] IN BLOOD BY AUTOMATED COUNT: 17.8 % (ref 12.3–15.4)
HCT VFR BLD AUTO: 26.5 % (ref 34–46.6)
HGB BLD-MCNC: 8.3 G/DL (ref 12–15.9)
LYMPHOCYTES # BLD AUTO: 7.43 10*3/MM3 (ref 0.7–3.1)
LYMPHOCYTES NFR BLD AUTO: 18.6 % (ref 19.6–45.3)
MCH RBC QN AUTO: 27.6 PG (ref 26.6–33)
MCHC RBC AUTO-ENTMCNC: 31.3 G/DL (ref 31.5–35.7)
MCV RBC AUTO: 88 FL (ref 79–97)
MONOCYTES # BLD AUTO: 3.84 10*3/MM3 (ref 0.1–0.9)
MONOCYTES NFR BLD AUTO: 9.6 % (ref 5–12)
NEUTROPHILS NFR BLD AUTO: 27.72 10*3/MM3 (ref 1.7–7)
NEUTROPHILS NFR BLD AUTO: 69.4 % (ref 42.7–76)
PLATELET # BLD AUTO: 381 10*3/MM3 (ref 140–450)
PMV BLD AUTO: 10.2 FL (ref 6–12)
RBC # BLD AUTO: 3.01 10*6/MM3 (ref 3.77–5.28)
WBC NRBC COR # BLD AUTO: 39.94 10*3/MM3 (ref 3.4–10.8)

## 2024-04-16 PROCEDURE — 1125F AMNT PAIN NOTED PAIN PRSNT: CPT | Performed by: NURSE PRACTITIONER

## 2024-04-16 PROCEDURE — 1159F MED LIST DOCD IN RCRD: CPT | Performed by: NURSE PRACTITIONER

## 2024-04-16 PROCEDURE — 36592 COLLECT BLOOD FROM PICC: CPT

## 2024-04-16 PROCEDURE — 99215 OFFICE O/P EST HI 40 MIN: CPT | Performed by: NURSE PRACTITIONER

## 2024-04-16 PROCEDURE — 85025 COMPLETE CBC W/AUTO DIFF WBC: CPT | Performed by: NURSE PRACTITIONER

## 2024-04-16 PROCEDURE — 1160F RVW MEDS BY RX/DR IN RCRD: CPT | Performed by: NURSE PRACTITIONER

## 2024-04-16 RX ORDER — MIRTAZAPINE 30 MG/1
30 TABLET, FILM COATED ORAL
Qty: 30 TABLET | Refills: 2 | Status: SHIPPED | OUTPATIENT
Start: 2024-04-16

## 2024-04-16 RX ORDER — SODIUM CHLORIDE 0.9 % (FLUSH) 0.9 %
20 SYRINGE (ML) INJECTION AS NEEDED
OUTPATIENT
Start: 2024-04-16

## 2024-04-16 RX ORDER — ALPRAZOLAM 0.5 MG/1
0.5 TABLET ORAL NIGHTLY PRN
Qty: 30 TABLET | Refills: 0 | Status: SHIPPED | OUTPATIENT
Start: 2024-04-16

## 2024-04-16 RX ORDER — SODIUM CHLORIDE 0.9 % (FLUSH) 0.9 %
20 SYRINGE (ML) INJECTION AS NEEDED
Status: DISCONTINUED | OUTPATIENT
Start: 2024-04-16 | End: 2024-04-17 | Stop reason: HOSPADM

## 2024-04-16 RX ORDER — FUROSEMIDE 40 MG/1
40 TABLET ORAL DAILY
Qty: 30 TABLET | Refills: 2 | Status: SHIPPED | OUTPATIENT
Start: 2024-04-16

## 2024-04-16 RX ORDER — SODIUM CHLORIDE 9 MG/ML
500 INJECTION, SOLUTION INTRAVENOUS ONCE
OUTPATIENT
Start: 2024-04-16

## 2024-04-16 RX ADMIN — Medication 20 ML: at 14:19

## 2024-04-16 NOTE — PROGRESS NOTES
[facility] sent transportation request to [facility] EMS van for 4/23, 5/2, 5/7, and 5/9 appointments.

## 2024-04-16 NOTE — PROGRESS NOTES
Specialty Pharmacy Note: Iclusig (ponatinib)    Spoke to pt in clinic. Pt instructions to take every other day, but pt reports she takes on Monday, Wed, and Friday only to avoid confusion. Discussed she is fine to continued to take medication this way, but may need to adjust spending on medication response. She reports she does tolerate medication better than Sprycel (dasatinib) and reports missing fewer doses of medication. She recently scheduled her next refill with dispensing pharmacy and had no questions or concerns at this time.      Thank you,  Amy Aldridge, Pharm.D.

## 2024-04-16 NOTE — PROGRESS NOTES
Pt to clinic for Picc Line dressing change. PICC flushed with NS and 10cc of blood wasted. Blood specimens sent to lab per protocol. Old dressing removed with no issue.  New securing dressing, and biopatch placed. Secure device was still intact and let in place. PICC line was recapped and flushed with no blood return. Pt's reports they have a lot of trouble getting blood and that the line is very positional. Pt denies any pain of discomfort when flushed with NS. Pt placed in waiting room for a F/U with Denisha PRICE NP. [facility] staff notified.

## 2024-04-17 ENCOUNTER — HOME HEALTH ADMISSION (OUTPATIENT)
Dept: HOME HEALTH SERVICES | Facility: HOME HEALTHCARE | Age: 49
End: 2024-04-17
Payer: COMMERCIAL

## 2024-04-23 ENCOUNTER — TELEPHONE (OUTPATIENT)
Dept: ONCOLOGY | Facility: CLINIC | Age: 49
End: 2024-04-23
Payer: MEDICARE

## 2024-04-23 ENCOUNTER — HOSPITAL ENCOUNTER (OUTPATIENT)
Dept: ONCOLOGY | Facility: HOSPITAL | Age: 49
Discharge: HOME | End: 2024-04-23
Admitting: INTERNAL MEDICINE
Payer: MEDICARE

## 2024-04-23 DIAGNOSIS — C95.90 LEUKEMIA NOT HAVING ACHIEVED REMISSION, UNSPECIFIED LEUKEMIA TYPE: ICD-10-CM

## 2024-04-23 DIAGNOSIS — Z45.2 PICC (PERIPHERALLY INSERTED CENTRAL CATHETER) FLUSH: Primary | ICD-10-CM

## 2024-04-23 DIAGNOSIS — C92.10 BLAST CRISIS PHASE OF CHRONIC MYELOID LEUKEMIA: ICD-10-CM

## 2024-04-23 LAB
BASOPHILS # BLD AUTO: 0.18 10*3/MM3 (ref 0–0.2)
BASOPHILS NFR BLD AUTO: 0.8 % (ref 0–1.5)
DEPRECATED RDW RBC AUTO: 53.9 FL (ref 37–54)
EOSINOPHIL # BLD AUTO: 0.15 10*3/MM3 (ref 0–0.4)
EOSINOPHIL NFR BLD AUTO: 0.6 % (ref 0.3–6.2)
ERYTHROCYTE [DISTWIDTH] IN BLOOD BY AUTOMATED COUNT: 17.9 % (ref 12.3–15.4)
HCT VFR BLD AUTO: 24.6 % (ref 34–46.6)
HGB BLD-MCNC: 7.5 G/DL (ref 12–15.9)
LYMPHOCYTES # BLD AUTO: 4.42 10*3/MM3 (ref 0.7–3.1)
LYMPHOCYTES NFR BLD AUTO: 18.6 % (ref 19.6–45.3)
MCH RBC QN AUTO: 26.8 PG (ref 26.6–33)
MCHC RBC AUTO-ENTMCNC: 30.5 G/DL (ref 31.5–35.7)
MCV RBC AUTO: 87.9 FL (ref 79–97)
MONOCYTES # BLD AUTO: 1.97 10*3/MM3 (ref 0.1–0.9)
MONOCYTES NFR BLD AUTO: 8.3 % (ref 5–12)
NEUTROPHILS NFR BLD AUTO: 17.1 10*3/MM3 (ref 1.7–7)
NEUTROPHILS NFR BLD AUTO: 71.7 % (ref 42.7–76)
PLATELET # BLD AUTO: 269 10*3/MM3 (ref 140–450)
PMV BLD AUTO: 10.8 FL (ref 6–12)
RBC # BLD AUTO: 2.8 10*6/MM3 (ref 3.77–5.28)
WBC NRBC COR # BLD AUTO: 23.82 10*3/MM3 (ref 3.4–10.8)

## 2024-04-23 PROCEDURE — 36592 COLLECT BLOOD FROM PICC: CPT

## 2024-04-23 PROCEDURE — 85025 COMPLETE CBC W/AUTO DIFF WBC: CPT | Performed by: INTERNAL MEDICINE

## 2024-04-23 RX ORDER — SODIUM CHLORIDE 0.9 % (FLUSH) 0.9 %
20 SYRINGE (ML) INJECTION AS NEEDED
Status: DISCONTINUED | OUTPATIENT
Start: 2024-04-23 | End: 2024-04-24 | Stop reason: HOSPADM

## 2024-04-23 RX ORDER — SODIUM CHLORIDE 0.9 % (FLUSH) 0.9 %
20 SYRINGE (ML) INJECTION AS NEEDED
OUTPATIENT
Start: 2024-04-23

## 2024-04-23 RX ADMIN — Medication 20 ML: at 15:05

## 2024-04-23 NOTE — TELEPHONE ENCOUNTER
----- Message from Meghann Lerma MD sent at 4/23/2024  3:30 PM EDT -----  Find out if she is symptomatic if she is we can give her 1 unit of blood this week

## 2024-04-23 NOTE — TELEPHONE ENCOUNTER
"Called pt to let her know that her hgb was low and to see if she is symptomatic. Pt stated that she is \"sick from chemo\" but did not wish to receive a blood transfusion at this time. I advised her to call back if she changes her mind.   "

## 2024-04-23 NOTE — PROGRESS NOTES
Pt to clinic for Picc Line dressing change. Old dressing removed with no issue.  CBC obtained after 10 ml waste and specimen to lab for processing. New securing dressing, and biopatch placed. Secure device was still intact and let in place. PICC line was recapped and flushed with good blood return noted.

## 2024-04-24 ENCOUNTER — SPECIALTY PHARMACY (OUTPATIENT)
Dept: PHARMACY | Facility: HOSPITAL | Age: 49
End: 2024-04-24
Payer: MEDICARE

## 2024-05-07 ENCOUNTER — HOSPITAL ENCOUNTER (OUTPATIENT)
Dept: ONCOLOGY | Facility: HOSPITAL | Age: 49
Discharge: HOME | End: 2024-05-07
Admitting: INTERNAL MEDICINE
Payer: MEDICARE

## 2024-05-07 ENCOUNTER — TELEPHONE (OUTPATIENT)
Dept: ONCOLOGY | Facility: CLINIC | Age: 49
End: 2024-05-07
Payer: MEDICARE

## 2024-05-07 DIAGNOSIS — C95.90 LEUKEMIA NOT HAVING ACHIEVED REMISSION, UNSPECIFIED LEUKEMIA TYPE: ICD-10-CM

## 2024-05-07 LAB
BASOPHILS # BLD AUTO: 0.17 10*3/MM3 (ref 0–0.2)
BASOPHILS NFR BLD AUTO: 1.6 % (ref 0–1.5)
DEPRECATED RDW RBC AUTO: 58.6 FL (ref 37–54)
EOSINOPHIL # BLD AUTO: 0.07 10*3/MM3 (ref 0–0.4)
EOSINOPHIL NFR BLD AUTO: 0.7 % (ref 0.3–6.2)
ERYTHROCYTE [DISTWIDTH] IN BLOOD BY AUTOMATED COUNT: 19.3 % (ref 12.3–15.4)
HCT VFR BLD AUTO: 20.3 % (ref 34–46.6)
HGB BLD-MCNC: 6.1 G/DL (ref 12–15.9)
LYMPHOCYTES # BLD AUTO: 2.14 10*3/MM3 (ref 0.7–3.1)
LYMPHOCYTES NFR BLD AUTO: 19.9 % (ref 19.6–45.3)
MCH RBC QN AUTO: 26.8 PG (ref 26.6–33)
MCHC RBC AUTO-ENTMCNC: 30 G/DL (ref 31.5–35.7)
MCV RBC AUTO: 89 FL (ref 79–97)
MONOCYTES # BLD AUTO: 0.59 10*3/MM3 (ref 0.1–0.9)
MONOCYTES NFR BLD AUTO: 5.5 % (ref 5–12)
NEUTROPHILS NFR BLD AUTO: 7.79 10*3/MM3 (ref 1.7–7)
NEUTROPHILS NFR BLD AUTO: 72.3 % (ref 42.7–76)
PLATELET # BLD AUTO: 101 10*3/MM3 (ref 140–450)
PMV BLD AUTO: 9.9 FL (ref 6–12)
RBC # BLD AUTO: 2.28 10*6/MM3 (ref 3.77–5.28)
WBC NRBC COR # BLD AUTO: 10.76 10*3/MM3 (ref 3.4–10.8)

## 2024-05-07 PROCEDURE — G0463 HOSPITAL OUTPT CLINIC VISIT: HCPCS

## 2024-05-07 PROCEDURE — 85025 COMPLETE CBC W/AUTO DIFF WBC: CPT | Performed by: INTERNAL MEDICINE

## 2024-05-08 ENCOUNTER — DOCUMENTATION (OUTPATIENT)
Dept: ONCOLOGY | Facility: CLINIC | Age: 49
End: 2024-05-08
Payer: MEDICARE

## 2024-05-08 ENCOUNTER — HOSPITAL ENCOUNTER (OUTPATIENT)
Dept: INFUSION THERAPY | Facility: HOSPITAL | Age: 49
Discharge: HOME | End: 2024-05-08
Admitting: INTERNAL MEDICINE
Payer: MEDICARE

## 2024-05-08 ENCOUNTER — SPECIALTY PHARMACY (OUTPATIENT)
Dept: PHARMACY | Facility: HOSPITAL | Age: 49
End: 2024-05-08
Payer: MEDICARE

## 2024-05-08 VITALS
DIASTOLIC BLOOD PRESSURE: 74 MMHG | OXYGEN SATURATION: 95 % | TEMPERATURE: 95 F | SYSTOLIC BLOOD PRESSURE: 109 MMHG | HEART RATE: 112 BPM | RESPIRATION RATE: 17 BRPM

## 2024-05-08 DIAGNOSIS — D64.9 SYMPTOMATIC ANEMIA: Primary | ICD-10-CM

## 2024-05-08 DIAGNOSIS — D64.9 ANEMIA, UNSPECIFIED TYPE: Primary | ICD-10-CM

## 2024-05-08 DIAGNOSIS — C92.10 CML (CHRONIC MYELOCYTIC LEUKEMIA): ICD-10-CM

## 2024-05-08 DIAGNOSIS — D64.9 SYMPTOMATIC ANEMIA: ICD-10-CM

## 2024-05-08 DIAGNOSIS — D64.9 ANEMIA, UNSPECIFIED TYPE: ICD-10-CM

## 2024-05-08 DIAGNOSIS — C95.90 LEUKEMIA NOT HAVING ACHIEVED REMISSION, UNSPECIFIED LEUKEMIA TYPE: Primary | ICD-10-CM

## 2024-05-08 DIAGNOSIS — F41.8 DEPRESSION WITH ANXIETY: ICD-10-CM

## 2024-05-08 LAB
ABO GROUP BLD: NORMAL
ANISOCYTOSIS BLD QL: ABNORMAL
BASO STIPL COARSE BLD QL SMEAR: ABNORMAL
BASOPHILS # BLD MANUAL: 0.34 10*3/MM3 (ref 0–0.2)
BASOPHILS NFR BLD MANUAL: 3 % (ref 0–1.5)
BB HOLD TUBE: NORMAL
BLD GP AB SCN SERPL QL: NEGATIVE
DEPRECATED RDW RBC AUTO: 59.2 FL (ref 37–54)
ERYTHROCYTE [DISTWIDTH] IN BLOOD BY AUTOMATED COUNT: 19.2 % (ref 12.3–15.4)
HCT VFR BLD AUTO: 24.9 % (ref 34–46.6)
HGB BLD-MCNC: 7.7 G/DL (ref 12–15.9)
LYMPHOCYTES # BLD MANUAL: 3.26 10*3/MM3 (ref 0.7–3.1)
LYMPHOCYTES NFR BLD MANUAL: 5 % (ref 5–12)
MCH RBC QN AUTO: 26.6 PG (ref 26.6–33)
MCHC RBC AUTO-ENTMCNC: 30.9 G/DL (ref 31.5–35.7)
MCV RBC AUTO: 86.2 FL (ref 79–97)
METAMYELOCYTES NFR BLD MANUAL: 3 % (ref 0–0)
MONOCYTES # BLD: 0.56 10*3/MM3 (ref 0.1–0.9)
NEUTROPHILS # BLD AUTO: 6.74 10*3/MM3 (ref 1.7–7)
NEUTROPHILS NFR BLD MANUAL: 59 % (ref 42.7–76)
NEUTS BAND NFR BLD MANUAL: 1 % (ref 0–5)
NRBC SPEC MANUAL: 3 /100 WBC (ref 0–0.2)
PLAT MORPH BLD: NORMAL
PLATELET # BLD AUTO: 133 10*3/MM3 (ref 140–450)
PMV BLD AUTO: 10.8 FL (ref 6–12)
POIKILOCYTOSIS BLD QL SMEAR: ABNORMAL
RBC # BLD AUTO: 2.89 10*6/MM3 (ref 3.77–5.28)
RH BLD: POSITIVE
SCAN SLIDE: NORMAL
T&S EXPIRATION DATE: NORMAL
VARIANT LYMPHS NFR BLD MANUAL: 29 % (ref 19.6–45.3)
WBC MORPH BLD: NORMAL
WBC NRBC COR # BLD AUTO: 11.23 10*3/MM3 (ref 3.4–10.8)

## 2024-05-08 PROCEDURE — 36592 COLLECT BLOOD FROM PICC: CPT

## 2024-05-08 PROCEDURE — G0463 HOSPITAL OUTPT CLINIC VISIT: HCPCS

## 2024-05-08 PROCEDURE — 86901 BLOOD TYPING SEROLOGIC RH(D): CPT | Performed by: INTERNAL MEDICINE

## 2024-05-08 PROCEDURE — 86850 RBC ANTIBODY SCREEN: CPT | Performed by: INTERNAL MEDICINE

## 2024-05-08 PROCEDURE — 85007 BL SMEAR W/DIFF WBC COUNT: CPT | Performed by: INTERNAL MEDICINE

## 2024-05-08 PROCEDURE — 86900 BLOOD TYPING SEROLOGIC ABO: CPT | Performed by: INTERNAL MEDICINE

## 2024-05-08 PROCEDURE — 86902 BLOOD TYPE ANTIGEN DONOR EA: CPT

## 2024-05-08 PROCEDURE — 86922 COMPATIBILITY TEST ANTIGLOB: CPT

## 2024-05-08 PROCEDURE — 85025 COMPLETE CBC W/AUTO DIFF WBC: CPT | Performed by: INTERNAL MEDICINE

## 2024-05-08 RX ORDER — SODIUM CHLORIDE 9 MG/ML
250 INJECTION, SOLUTION INTRAVENOUS AS NEEDED
OUTPATIENT
Start: 2024-05-08

## 2024-05-08 RX ORDER — SODIUM CHLORIDE 9 MG/ML
250 INJECTION, SOLUTION INTRAVENOUS AS NEEDED
Status: CANCELLED | OUTPATIENT
Start: 2024-05-08

## 2024-05-09 ENCOUNTER — OFFICE VISIT (OUTPATIENT)
Dept: FAMILY MEDICINE CLINIC | Facility: CLINIC | Age: 49
End: 2024-05-09
Payer: MEDICARE

## 2024-05-09 ENCOUNTER — HOSPITAL ENCOUNTER (OUTPATIENT)
Dept: INFUSION THERAPY | Facility: HOSPITAL | Age: 49
Discharge: HOME | End: 2024-05-09
Admitting: INTERNAL MEDICINE
Payer: MEDICARE

## 2024-05-09 VITALS
HEART RATE: 107 BPM | SYSTOLIC BLOOD PRESSURE: 167 MMHG | TEMPERATURE: 97.9 F | DIASTOLIC BLOOD PRESSURE: 92 MMHG | RESPIRATION RATE: 18 BRPM | OXYGEN SATURATION: 92 %

## 2024-05-09 VITALS
BODY MASS INDEX: 29.23 KG/M2 | OXYGEN SATURATION: 94 % | DIASTOLIC BLOOD PRESSURE: 78 MMHG | RESPIRATION RATE: 16 BRPM | SYSTOLIC BLOOD PRESSURE: 124 MMHG | HEIGHT: 63 IN | HEART RATE: 106 BPM

## 2024-05-09 DIAGNOSIS — F11.20 CHRONIC NARCOTIC DEPENDENCE: ICD-10-CM

## 2024-05-09 DIAGNOSIS — K31.84 DIABETIC GASTROPARESIS: Chronic | ICD-10-CM

## 2024-05-09 DIAGNOSIS — C92.10 CML (CHRONIC MYELOCYTIC LEUKEMIA): Primary | Chronic | ICD-10-CM

## 2024-05-09 DIAGNOSIS — D64.9 ANEMIA, UNSPECIFIED TYPE: ICD-10-CM

## 2024-05-09 DIAGNOSIS — F41.8 DEPRESSION WITH ANXIETY: Chronic | ICD-10-CM

## 2024-05-09 DIAGNOSIS — I42.8 NICM (NONISCHEMIC CARDIOMYOPATHY): Chronic | ICD-10-CM

## 2024-05-09 DIAGNOSIS — E11.42 TYPE 2 DIABETES MELLITUS WITH DIABETIC POLYNEUROPATHY, WITH LONG-TERM CURRENT USE OF INSULIN: Chronic | ICD-10-CM

## 2024-05-09 DIAGNOSIS — Z79.4 TYPE 2 DIABETES MELLITUS WITH DIABETIC POLYNEUROPATHY, WITH LONG-TERM CURRENT USE OF INSULIN: Chronic | ICD-10-CM

## 2024-05-09 DIAGNOSIS — G89.29 OTHER CHRONIC PAIN: ICD-10-CM

## 2024-05-09 DIAGNOSIS — D69.6 THROMBOCYTOPENIA: ICD-10-CM

## 2024-05-09 DIAGNOSIS — E11.43 DIABETIC GASTROPARESIS: Chronic | ICD-10-CM

## 2024-05-09 PROCEDURE — 36430 TRANSFUSION BLD/BLD COMPNT: CPT

## 2024-05-09 PROCEDURE — 86900 BLOOD TYPING SEROLOGIC ABO: CPT

## 2024-05-09 PROCEDURE — P9040 RBC LEUKOREDUCED IRRADIATED: HCPCS

## 2024-05-09 PROCEDURE — 99204 OFFICE O/P NEW MOD 45 MIN: CPT | Performed by: INTERNAL MEDICINE

## 2024-05-09 PROCEDURE — 1125F AMNT PAIN NOTED PAIN PRSNT: CPT | Performed by: INTERNAL MEDICINE

## 2024-05-09 PROCEDURE — 3046F HEMOGLOBIN A1C LEVEL >9.0%: CPT | Performed by: INTERNAL MEDICINE

## 2024-05-09 RX ORDER — SODIUM CHLORIDE 9 MG/ML
250 INJECTION, SOLUTION INTRAVENOUS AS NEEDED
Status: DISCONTINUED | OUTPATIENT
Start: 2024-05-09 | End: 2024-05-11 | Stop reason: HOSPADM

## 2024-05-09 RX ORDER — MIRTAZAPINE 30 MG/1
30 TABLET, FILM COATED ORAL
Qty: 30 TABLET | Refills: 2 | Status: SHIPPED | OUTPATIENT
Start: 2024-05-09

## 2024-05-09 RX ORDER — FUROSEMIDE 40 MG/1
40 TABLET ORAL DAILY
Qty: 30 TABLET | Refills: 2 | Status: SHIPPED | OUTPATIENT
Start: 2024-05-09

## 2024-05-09 NOTE — PATIENT INSTRUCTIONS
Up to date with labs    Medications:  Continue current medications as prescribed  Refilled glucose sensors    Follow up with specialists as scheduled; please notify that Dr Stover is PCP    Encourage healthy diet and exercise    Follow up in about 2 months

## 2024-05-09 NOTE — PROGRESS NOTES
Chief Complaint  Establish Care    HPI:    Nieves Mc presents to [facility]    Patient presenting to establish care.     CML  Follows with hematology.  Initially diagnosed in 2018. She has been on numerous medications and chemotherapies.  Noted to have accelerated phase CML on bone marrow biopsy 11/3/2022.  Currently on Ponatinib 10 mg every other day.  Continues to have pancytopenia without need for transfusion. She also frequently has fatigue, nausea, and vomiting from the medication. She has been told that she does not have a lot of options remaining.     Cardiomyopathy  CHF  Secondary to previous treatment of CML.  Follows with cardiology.  Most recent echo with EF of 62% up from low of 26 to 30%.  Follows with cardiology.  Currently on metoprolol XL 25 mg daily, losartan 25 mg daily, Lasix 40 mg daily, and Farxiga..    Type 2 diabetes complicated by peripheral neuropathy  Currently on Farxiga and Lantus 25 mg nightly. She does not currently take sliding scale or meal time. She does not measure home blood sugars. She is blind in the right eye. Most recent A1c is 9.7 on 3/20/2024.     Nausea secondary to medications and likely gastroparesis from longstanding uncontrolled diabetes.  Previously referred to GI by hematologist.    Anxiety/depression  Currently on mirtazapine 30 mg nightly and Alprazolam 0.5 mg nightly as needed.     History of chronic narcotic dependence  Patient with history of pain pump that placed for chronic pain due to cancer. Previously was addicted to oxycodone. Follows with pain specialist through the [facility]. She is unsure of what is running through the pump. No recent reported issues with pain pump.     Patient recently admitted to [facility] from 3/19/2024 until 3/25/2024 for multifocal pneumonia.  Patient initially presented with increased weakness and shortness of breath. In the ED, patient was tachycardic.  Labs remarkable for WBC 35.96,  H&H 7.9/27.6 (previous earlier 6.5/22.2), D-dimer 1.23, sodium 133, blood glucose 451, , total bilirubin 1.9, BNP 12,315, troponin 29.  Chest x-ray shows bilateral airspace disease concerning for multifocal pneumonia and pulmonary edema.  CTA chest showed extensive right upper lobe and to a lesser degree right lower lobe pneumonia.  Patient initially treated with cefepime and azithromycin which was switched to Augmentin. Patient overall doing well since discharge.     Preventative:    Social:     Diet and Exercise: Could be better    Alcohol, Tobacco, and Recreational Drug use: None      Review of Systems:  ROS negative unless otherwise noted in HPI above.    Past Medical History:   Diagnosis Date    Acute respiratory failure with hypoxia 07/28/2022    Adverse effect of chemotherapy 11/08/2023    Bilateral subdural hematomas 05/18/2023    Bone pain     Chronic constipation 07/07/2023    CML (chronic myelocytic leukemia) 04/01/2018    COVID-19 virus infection 01/12/2022    Diabetes mellitus     Diabetic gastroparesis 07/07/2023    Extremity pain     Carlin. legs pain    Fall during current hospitalization 06/25/2023    Leg pain     left leg greater    Medically noncompliant 03/11/2021    Migraine     Petechial rash 11/08/2023    Pubic ramus fracture, left, closed, initial encounter 06/25/2023    Pulmonary embolism     Traumatic subdural hemorrhage without loss of consciousness 05/17/2023    Tumor lysis syndrome 07/05/2022    UTI (urinary tract infection) 07/06/2023    Vision loss     doing surgery         Current Outpatient Medications:     acetaminophen (TYLENOL) 325 MG tablet, Take 2 tablets by mouth Every 4 (Four) Hours As Needed for Moderate Pain. Indications: Fever, Pain, Disp: 60 tablet, Rfl: 3    ALPRAZolam (Xanax) 0.5 MG tablet, Take 1 tablet by mouth At Night As Needed for Anxiety. Indications: Feeling Anxious, Disp: 30 tablet, Rfl: 0    cetirizine (zyrTEC) 10 MG tablet, Take 1 tablet by mouth  Daily., Disp: 30 tablet, Rfl: 0    Continuous Blood Gluc Sensor (FreeStyle Zahira 2 Sensor) misc, Use 1 each 4 (Four) Times a Day Before Meals & at Bedtime. Dx code: E11.65, Disp: 2 each, Rfl: 2    dapagliflozin Propanediol (Farxiga) 10 MG tablet, Take 10 mg (1 tablet) by mouth Daily. Dx code: E11.65, Disp: 30 tablet, Rfl: 2    Diclofenac Sodium (VOLTAREN) 1 % gel gel, Apply 4 g topically to the appropriate area as directed 4 (Four) Times a Day As Needed (hip pain)., Disp: 100 g, Rfl: 2    furosemide (LASIX) 40 MG tablet, Take 1 tablet by mouth Daily. Indications: Edema, Disp: 30 tablet, Rfl: 2    gabapentin (Neurontin) 800 MG tablet, Take 1 tablet by mouth 3 (Three) Times a Day. Ordered through [facility]  Indications: Diabetes with Nerve Disease, Disp: 90 tablet, Rfl: 2    Insulin Glargine (LANTUS SOLOSTAR) 100 UNIT/ML injection pen, Inject 25 Units under the skin into the appropriate area as directed Every Night. Dx code: E11.65, Disp: 15 mL, Rfl: 2    Insulin Lispro, 1 Unit Dial, (HumaLOG KwikPen) 100 UNIT/ML solution pen-injector, Inject 7 Units under the skin into the appropriate area as directed 3 (Three) Times a Day Before Meals. Dx code: E11.65, Disp: 15 mL, Rfl: 2    Insulin Pen Needle (Pen Needles) 32G X 4 MM misc, Use 1 each 4 (Four) Times a Day Before Meals & at Bedtime. Dx code: E11.65, Disp: 200 each, Rfl: 2    mirtazapine (REMERON) 30 MG tablet, Take 1 tablet by mouth every night at bedtime. Indications: Major Depressive Disorder, Disp: 30 tablet, Rfl: 2    PONATinib HCl (Iclusig) 10 MG tablet, Take 10 mg by mouth Every Other Day. Take with or without food.  Swallow whole, do not crush or chew., Disp: 30 tablet, Rfl: 2    tiZANidine (ZANAFLEX) 4 MG tablet, Take 1 tablet by mouth Daily. Indications: Musculoskeletal Pain, Disp: , Rfl:     losartan (COZAAR) 25 MG tablet, Take 1 tablet by mouth Daily for 30 days., Disp: 30 tablet, Rfl: 0    metoclopramide (Reglan) 10 MG tablet, Take 1 tablet by mouth 4  "(Four) Times a Day. (Patient not taking: Reported on 4/16/2024), Disp: , Rfl:     metoprolol succinate XL (TOPROL-XL) 25 MG 24 hr tablet, Take 1 tablet by mouth Every 12 (Twelve) Hours for 30 days., Disp: 60 tablet, Rfl: 0    pain patient supplied pump, by Intrathecal route Continuous., Disp: , Rfl:   No current facility-administered medications for this visit.    Facility-Administered Medications Ordered in Other Visits:     acetaminophen (TYLENOL) tablet 650 mg, 650 mg, Oral, Once, Denisha Gill APRN    sodium chloride 0.9 % infusion 250 mL, 250 mL, Intravenous, PRN, Meghann Lerma MD    Social History     Socioeconomic History    Marital status: Legally    Tobacco Use    Smoking status: Never    Smokeless tobacco: Never   Vaping Use    Vaping status: Never Used   Substance and Sexual Activity    Alcohol use: No    Drug use: No    Sexual activity: Defer        Objective   Vital Signs:  /78   Pulse 106   Resp 16   Ht 160 cm (63\")   SpO2 94%   BMI 29.23 kg/m²   Estimated body mass index is 29.23 kg/m² as calculated from the following:    Height as of this encounter: 160 cm (63\").    Weight as of 3/29/24: 74.8 kg (165 lb).    Physical Exam:  General: Frail, ill-appearing patient, no apparent distress  HEENT: No posterior pharynx erythema, no tonsillar erythema or exudates, Poor dentition  Neck: No cervical lymphadenopathy  Cardiac: Regular rate and rhythm, normal S1/S2, no murmur, rubs or gallops, no lower extremity edema  Lungs: Clear to auscultation bilaterally, no crackles or wheezes  Abdomen: Soft, non-tender, no guarding or rebound tenderness, no hepatosplenomegaly  Skin: No significant rashes or lesions  MSK: Grossly normal tone and strength, PICC line in left upper extremity, Pain bump in left lumbar region  Neuro: Alert and oriented x3, CN II-XII grossly intact  Psych: Appropriate mood and affect      Assessment and Plan:    (C92.10) CML (chronic myelocytic " leukemia)  (D69.6) pancytopenia  Assessment: Longstanding CML diagnosed in 2018.  Follows with hematology.  Symptoms overall stable.  Currently on Ponatinib every other day as she has difficulty tolerating.  Counts overall stable without need of transfusion.  Plan:   - Follow up with labs   - Treatment, labs, and imaging per hematology    (I42.8) NICM (nonischemic cardiomyopathy)  Assessment: Secondary to CML treatment.  EF normal on most recent echo.  Follows with cardiology.  Reportedly compliant with medications including metoprolol, losartan, Lasix, and Farxiga.  Weight overall stable without evidence of acute exacerbation.  Plan:   - Follow-up with cardiology as scheduled  - Continue metoprolol, losartan, Lasix, and Farxiga as prescribed  - Monitor weight    (E11.42,  Z79.4) Type 2 diabetes mellitus with diabetic polyneuropathy, with long-term current use of insulin  (E11.43,  K31.84) Diabetic gastroparesis  Assessment: Blood sugars have been difficult to control due to poor appetite and side effects from CML treatment.  Most recent hemoglobin A1c 9.7 and not due for repeat.  Complications include gastroparesis and peripheral neuropathy.. Currently on Farxiga and Lantus.  Discussed importance of healthy diet and exercise to improve blood glucose.  Plan:  - Continue current medications as prescribed  - Continue to monitor home blood glucoses  - Continue losartan and statin  - Microalbumin checked less than a year ago.  - Encourage patient to continue/Increase dietary efforts and physical activity.  - Annual eye exam  - Routine self foot exams    (F41.8) Depression with anxiety  Assessment: Mood overall stable on current medications including mirtazapine and alprazolam.   Plan:  -Continue medications as prescribed    (F11.20) Chronic narcotic dependence  (G89.29) chronic pain   Assessment: Chronic pain reportedly secondary to malignancy.  Currently on pain pump managed through pain specialist.  Patient unsure  what is running through pain pump or at what dose.  Denies significant side effects on pain medications.  Plan:   - Follows with   - Pain pump per specialist  - Try to obtain outside records for review    Pneumonia  Assessment: Previously admitted for multifocal pneumonia, which resolved with treatment.  Currently asymptomatic.  Plan:  - Monitor    Patient was given instructions and counseling regarding her condition or for health maintenance advice. Please see specific information pulled into the AVS if appropriate.       Dr Pankaj Stover   Internal Medicine Physician  [facility]  [address], [address]  [place] [address]

## 2024-05-14 ENCOUNTER — HOSPITAL ENCOUNTER (OUTPATIENT)
Dept: ONCOLOGY | Facility: HOSPITAL | Age: 49
Discharge: HOME | End: 2024-05-14
Payer: MEDICARE

## 2024-05-14 DIAGNOSIS — Z78.9 POOR INTRAVENOUS ACCESS: Primary | ICD-10-CM

## 2024-05-14 PROCEDURE — G0463 HOSPITAL OUTPT CLINIC VISIT: HCPCS

## 2024-05-14 NOTE — PROGRESS NOTES
Pt to clinic for Picc Line dressing change. Old dressing removed with no issue.  New securing dressing, and biopatch placed. New secure device placed. PICC line was recapped and flushed with good blood return noted. Pt declined AVS and discharged accompanied by s/o.

## 2024-05-15 ENCOUNTER — TELEPHONE (OUTPATIENT)
Dept: ONCOLOGY | Facility: CLINIC | Age: 49
End: 2024-05-15
Payer: MEDICARE

## 2024-05-15 RX ORDER — GABAPENTIN 800 MG/1
800 TABLET ORAL 3 TIMES DAILY
Qty: 90 TABLET | Refills: 2 | Status: SHIPPED | OUTPATIENT
Start: 2024-05-15

## 2024-05-15 RX ORDER — METOCLOPRAMIDE 10 MG/1
10 TABLET ORAL 4 TIMES DAILY
Qty: 120 TABLET | Refills: 3 | Status: SHIPPED | OUTPATIENT
Start: 2024-05-15

## 2024-05-15 RX ORDER — TIZANIDINE 4 MG/1
4 TABLET ORAL DAILY
Qty: 90 TABLET | Refills: 1 | Status: SHIPPED | OUTPATIENT
Start: 2024-05-15

## 2024-05-16 ENCOUNTER — DOCUMENTATION (OUTPATIENT)
Dept: ONCOLOGY | Facility: CLINIC | Age: 49
End: 2024-05-16
Payer: MEDICARE

## 2024-05-16 ENCOUNTER — TELEPHONE (OUTPATIENT)
Dept: ONCOLOGY | Facility: CLINIC | Age: 49
End: 2024-05-16
Payer: MEDICARE

## 2024-05-16 NOTE — TELEPHONE ENCOUNTER
Called Pt to offer an appt w/Dr Lerma next week on 5/23. Pt stated she was unable, due to [facility] hours, to come in before noon. I did edd a Picc dressing change appt for Pt. Which is needed weekly. Pt also stated Rose Mary also takes care of her transportation. I messaged Rose Mary via secure chat to let her know this and when Hope's appt was for next week. Rose Mary v/u

## 2024-05-16 NOTE — TELEPHONE ENCOUNTER
Caller: Nieves Mc A    Relationship: Self    Best call back number: [phone]     What is the best time to reach you: ASAP    Who are you requesting to speak with (clinical staff, provider,  specific staff member): SCHEDULING    What was the call regarding: PATIENT IS RETURNING CALL TO [facility] TO GET SCHEDULED FOR PICC LINE CHANGE AND FOLLOW UP, HUB UNABLE TO WARM TRANSFER, PLEASE CALL PATIENT BACK TO SCHEDULE.

## 2024-05-16 NOTE — PROGRESS NOTES
Pt informed nursing staff that she needed a ride for her appt on 5/21 at 2pm for a dressing change.  Nursing requested OSW assistance.  OSW contacted [facility] and they are able to transport pt.  OSW call pt and LVM informing.

## 2024-05-21 ENCOUNTER — HOSPITAL ENCOUNTER (OUTPATIENT)
Dept: ONCOLOGY | Facility: HOSPITAL | Age: 49
Discharge: HOME | End: 2024-05-21
Admitting: INTERNAL MEDICINE
Payer: MEDICARE

## 2024-05-21 DIAGNOSIS — E86.0 DEHYDRATION: Primary | ICD-10-CM

## 2024-05-21 DIAGNOSIS — Z45.2 PICC (PERIPHERALLY INSERTED CENTRAL CATHETER) FLUSH: ICD-10-CM

## 2024-05-21 PROCEDURE — 25810000003 SODIUM CHLORIDE 0.9 % SOLUTION: Performed by: INTERNAL MEDICINE

## 2024-05-21 PROCEDURE — 36592 COLLECT BLOOD FROM PICC: CPT

## 2024-05-21 RX ORDER — SODIUM CHLORIDE 9 MG/ML
500 INJECTION, SOLUTION INTRAVENOUS ONCE
Status: DISCONTINUED | OUTPATIENT
Start: 2024-05-21 | End: 2024-05-22 | Stop reason: HOSPADM

## 2024-05-21 RX ORDER — SODIUM CHLORIDE 0.9 % (FLUSH) 0.9 %
20 SYRINGE (ML) INJECTION AS NEEDED
Status: CANCELLED | OUTPATIENT
Start: 2024-05-21

## 2024-05-21 RX ORDER — SODIUM CHLORIDE 0.9 % (FLUSH) 0.9 %
20 SYRINGE (ML) INJECTION AS NEEDED
Status: DISCONTINUED | OUTPATIENT
Start: 2024-05-21 | End: 2024-05-22 | Stop reason: HOSPADM

## 2024-05-21 RX ADMIN — Medication 20 ML: at 14:25

## 2024-05-21 NOTE — PROGRESS NOTES
Hematology/Oncology Outpatient Follow Up    PATIENT NAME:Nieves Mc  :1975  MRN: [id number]  PRIMARY CARE PHYSICIAN: Pankaj Stover MD  REFERRING PHYSICIAN: Pankaj Stover MD    Chief Complaint   Patient presents with    Follow-up     CML (chronic myelocytic leukemia)        HISTORY OF PRESENT ILLNESS:         Patient is a 48 y.o. female with PMH significant for CML on treatment with Imatinib.  Patient stated that she typically feels poorly with Imatinib with frequent nausea and vomiting.  Also complained of weakness and fatigue.  Patient presented to ED on 10/22/18 with complaints of an elevated blood sugar around 400.  Stated she felt poorly and has had a productive cough with yellow sputum.  Complained of nausea and stated she was unable to keep any food down anytime she ate.  The patient reported subjective fever without chills.  She stated she had been coughing so hard that she was vomiting.  She denied hematemesis.  Denied diarrhea, constipation or blood in her stool.       Patient’s chest x-ray showed no evidence of acute pulmonary embolus.  The patient has ground-glass opacities throughout the lungs.  There is some air trapping noted which would appear to be   infectious.  Patient was started on antibiotics.      Hem/Onc was consulted on 10/23/18 for co-management of patient with CML.  Patient was seen by Dr. Lerma on 10/12 on previous admission for patient reported CML on Imatinib (Gleevec).  Patient reports she is under the care of Dr. Roach at [facility] in [place].  Patient was seen by Dr. Lerma in May 2018 when patient presented with hematuria, which was attributed to her Imatinib.  Dr. Lerma followed during hospitalization but then patient returned to Dr. Roach for her usual follow up.  She was again seen on 10/12.  Patient is stating she will switch to Dr. Lerma.    Review of Dr. Roach’s note:  On 18 patient had BCR-abl which was 61.326.  Patient had been  on Imatinib 400 mg daily.  She had presented in March 2018 with WBC of 24, hemoglobin 13.1, platelet count of 1,067,000.  Patient apparently had follow up BCR-abl in August 2018, results are not available for review.  Her bone marrow aspiration and biopsy was also performed at that time is currently not available for review.    11/6/18 - .  Uric acid 6.5.  BCR-abl by RT-PCR was measured at 3.36%.    Due to thrombocytosis, patient was placed on Hydroxyurea 500 mg twice a day on 12/11/18.  Platelet count juana to 900,000.  Patient was noncompliant with CBCs that were recommended.    Patient was asked to return to the office after not being able to reach her.   12/27/18 - Bone marrow aspiration and biopsy was consistent with chronic myeloid leukemia.  BCR-abl positive, chronic phase.  ABL kinase mutational analysis is currently pending on the bone marrow.    1/3/19 - Repeat CBC, WBC 17, hemoglobin 11.9, platelet count 1,072,000.  Hydroxyurea was increased to 1 gm twice a day with follow up CBC.    1/6/19 - Follow up CBC showed progressive increase in platelet to 1.1 million.  Patient was offered admission to the hospital, which she declined.  Hydroxyurea was increased to 1 gm three   times a day as of 1/6/19.   1/7/19 - EKG shows sinus tachycardia.   milliseconds.    ABL kinase on peripheral blood was ordered on 1/11/19.  Based on sequence analysis, there was no mutation detected.    2/12/19 - Patient was initiated on Tasigna 300 mg twice a day.  2/13/19 - Urinalysis was negative for hematuria.   2/22/19 -  milliseconds.  3/13/19 - EKG with QTC is 413 milliseconds.  Nonspecific changes noted.    4/18/19 - EKG showed QTC prolonged to 453 milliseconds.  This is changed from prior.  4/18/19 - Free T4 normal at 0.91.  Magnesium was 1.7.  BUN is 6.  Creatinine 0.7.  Bilirubin 2.2.  Phosphorous normal 3.7.  TSH 0.43, normal.  Free T3 was normal at 3.47.  Ionized calcium   normal at 1.23.  BCR-abl was  0.522%.    5/7/19 - EKG showed QTC of 441 milliseconds.  QT was 366.     Patient has been lost to follow-up since 2019 for CML.  Patient states that she lost her insurance.  She also does not have any primary care physician at this time.  She is diabetic on insulin.  Patient was recently admitted to the hospital for pneumonia due to COVID-19 infection.  At that time patient made a decision to become compliant with treatment.  She is currently on to Cigna 300 mg p.o. twice a day.  She is also on hydroxyurea 1 g twice a day.  Overall she feels better, she has more energy.  3/1/2022 white count is 53.92, hemoglobin 11.7 and platelets are 472    6/1/2022: Patient has not been seen since March 2022.  Also verified that she has not been taking to Tasigna since February 2022.  She comes in today with cough for 1 and half months, shortness of breath, denies fevers.  6/30/2022 patient had 2D echocardiogram which showed an EF of 41 to 45%.  Left ventricular cavity is mildly dilated.  She was diagnosed with nonischemic cardiomyopathy    11/18/2022: Patient was transferred from [facility] to [facility] due to cardiac failure.  She was then seen by NP [facility] hematology department.  Patient underwent tumor aspiration and biopsy at that time did not show chronic myeloid leukemia in accelerated phase markedly hypercellular marrow with megakaryocytic and myeloid hyperplasia with atypia and increased basophils blasts are not increased less than 1% moderate reticulin fibrosis.  According to the patient hydroxyurea was discontinued she was prescribed Gleevec 600 mg daily but she has only been taking 400 mg as she cannot find other milligram tablets.  She is already been pump inserted into her pelvic area.  She continues to experience nausea which resulted in her not taking the baclofen she should.  1/12/2023: WBC 17.64, hemoglobin 10.2, MCV 88.8, platelets 458,000.  On Gleevec 600 mg daily and hydroxyurea  500 mg twice daily.  2023: WBC 10.67, hemoglobin 10.0, MCV 86.4, platelets 370,000.  Patient on Gleevec 600 mg daily and hydroxyurea 500 mg once a day.  Patient instructed at the visit to discontinue her hydroxyurea.  2023: WBC 17.75, hemoglobin 10.4, MCV 87.2, platelets 425,000.  On Gleevec 600 mg daily.  3/10/2023: 2D echocardiogram showing EF of 44% EKG showing sinus tachycardia QTc of 491  10/5/2023: Patient was initiated on Dasatinib 100 mg p.o. daily  2023-2023: Patient hospitalized at [facility] due to rash that was thought to be secondary to Sprycel and elevated glucose.  Sprycel was held and patient was treated with a 5-day course of prednisone.  2023: WBC 3.10, hemoglobin 8.2, MCV 90.3, platelets 214,000, ANC 1100.    Past Medical History:   Diagnosis Date    Acute respiratory failure with hypoxia 2022    Adverse effect of chemotherapy 2023    Bilateral subdural hematomas 2023    Bone pain     Chronic constipation 2023    CML (chronic myelocytic leukemia) 2018    COVID-19 virus infection 2022    Diabetes mellitus     Diabetic gastroparesis 2023    Extremity pain     Carlin. legs pain    Fall during current hospitalization 2023    Leg pain     left leg greater    Medically noncompliant 2021    Migraine     Petechial rash 2023    Pubic ramus fracture, left, closed, initial encounter 2023    Pulmonary embolism     Traumatic subdural hemorrhage without loss of consciousness 2023    Tumor lysis syndrome 2022    UTI (urinary tract infection) 2023    Vision loss     doing surgery       Past Surgical History:   Procedure Laterality Date    BONE MARROW BIOPSY      BREAST SURGERY      BRONCHOSCOPY N/A 2022    Procedure: BRONCHOSCOPY bilateral lung washing;  Surgeon: Charlene Camara MD;  Location: [facility] ENDOSCOPY;  Service: Pulmonary;  Laterality: N/A;  post: rule out infection vs transfusion lung injury      SECTION      CHOLECYSTECTOMY      COLONOSCOPY N/A 6/29/2023    Procedure: COLONOSCOPY;  Surgeon: Freddy Villeda MD;  Location: [facility] ENDOSCOPY;  Service: Gastroenterology;  Laterality: N/A;  poor prep    ENDOSCOPY N/A 6/29/2023    Procedure: ESOPHAGOGASTRODUODENOSCOPY with biopsy x2 areas;  Surgeon: Freddy Villeda MD;  Location: [facility] ENDOSCOPY;  Service: Gastroenterology;  Laterality: N/A;  food in stomach;abnormal duodenal mucosa    EYE SURGERY      laser surgery due  to hemmorage--- 5/13/2021-- another surgery  lt eye11/15/21    RETINAL DETACHMENT SURGERY      SPINE SURGERY      Lombardi spinal block    TUBAL ABDOMINAL LIGATION           Current Outpatient Medications:     acetaminophen (TYLENOL) 325 MG tablet, Take 2 tablets by mouth Every 4 (Four) Hours As Needed for Moderate Pain. Indications: Fever, Pain, Disp: 60 tablet, Rfl: 3    ALPRAZolam (Xanax) 0.5 MG tablet, Take 1 tablet by mouth At Night As Needed for Anxiety. Indications: Feeling Anxious, Disp: 30 tablet, Rfl: 0    cetirizine (zyrTEC) 10 MG tablet, Take 1 tablet by mouth Daily., Disp: 30 tablet, Rfl: 0    Continuous Glucose Sensor (FreeStyle Zahira 2 Sensor) misc, Use 1 each 4 (Four) Times a Day Before Meals & at Bedtime. Dx code: E11.65, Disp: 2 each, Rfl: 2    dapagliflozin Propanediol (Farxiga) 10 MG tablet, Take 10 mg (1 tablet) by mouth Daily. Dx code: E11.65, Disp: 30 tablet, Rfl: 2    Diclofenac Sodium (VOLTAREN) 1 % gel gel, Apply 4 g topically to the appropriate area as directed 4 (Four) Times a Day As Needed (hip pain)., Disp: 100 g, Rfl: 2    furosemide (LASIX) 40 MG tablet, Take 1 tablet by mouth Daily. Indications: Edema, Disp: 30 tablet, Rfl: 2    gabapentin (Neurontin) 800 MG tablet, Take 1 tablet by mouth 3 (Three) Times a Day. Ordered through [facility]  Indications: Diabetes with Nerve Disease, Disp: 90 tablet, Rfl: 2    Insulin Glargine (LANTUS SOLOSTAR) 100 UNIT/ML injection pen, Inject 25 Units under the skin into the  appropriate area as directed Every Night. Dx code: E11.65, Disp: 15 mL, Rfl: 2    Insulin Lispro, 1 Unit Dial, (HumaLOG KwikPen) 100 UNIT/ML solution pen-injector, Inject 7 Units under the skin into the appropriate area as directed 3 (Three) Times a Day Before Meals. Dx code: E11.65, Disp: 15 mL, Rfl: 2    Insulin Pen Needle (Pen Needles) 32G X 4 MM misc, Use 1 each 4 (Four) Times a Day Before Meals & at Bedtime. Dx code: E11.65, Disp: 200 each, Rfl: 2    losartan (COZAAR) 25 MG tablet, Take 1 tablet by mouth Daily for 30 days., Disp: 30 tablet, Rfl: 0    metoclopramide (Reglan) 10 MG tablet, Take 1 tablet by mouth 4 (Four) Times a Day. Indications: Hiccups that are Hard to Cure, Nausea and Vomiting, Disp: 120 tablet, Rfl: 3    metoprolol succinate XL (TOPROL-XL) 25 MG 24 hr tablet, Take 1 tablet by mouth Every 12 (Twelve) Hours for 30 days., Disp: 60 tablet, Rfl: 0    mirtazapine (REMERON) 30 MG tablet, Take 1 tablet by mouth every night at bedtime. Indications: Major Depressive Disorder, Disp: 30 tablet, Rfl: 2    pain patient supplied pump, by Intrathecal route Continuous., Disp: , Rfl:     PONATinib HCl (Iclusig) 10 MG tablet, Take 10 mg by mouth Every Other Day. Take with or without food.  Swallow whole, do not crush or chew., Disp: 30 tablet, Rfl: 2    tiZANidine (ZANAFLEX) 4 MG tablet, Take 1 tablet by mouth Daily. Indications: Musculoskeletal Pain, Disp: 90 tablet, Rfl: 1  No current facility-administered medications for this visit.    Facility-Administered Medications Ordered in Other Visits:     acetaminophen (TYLENOL) tablet 650 mg, 650 mg, Oral, Once, Denisha Gill APRN    No Known Allergies    Family History   Problem Relation Age of Onset    Diabetes Mother     Diabetes Maternal Grandmother     Heart attack Maternal Grandmother     Stroke Maternal Grandmother        Cancer-related family history is not on file.    Social History     Tobacco Use    Smoking status: Never    Smokeless tobacco:  "Never   Vaping Use    Vaping status: Never Used   Substance Use Topics    Alcohol use: No    Drug use: No       I have reviewed and confirmed the accuracy of the patient's history: Chief complaint, HPI, ROS, and Subjective as entered by the MA/LPN/RN. Meghann Lerma MD 05/23/24          SUBJECTIVE:    Patient remains on ponatinib 10 mg every other day.  She denies any new issues      Hope A Jesusita reports a pain score of 9.  Given her pain assessment as noted, treatment options were discussed and the following options were decided upon as a follow-up plan to address the patient's pain: referral to Physical Therapy.   Prescription for Voltaren gel, continue pain management with specialist      .REVIEW OF SYSTEMS:    Review of Systems   Constitutional: Negative for chills and fever.   HENT: Negative for ear pain, mouth sores, nosebleeds and sore throat.    Eyes: Negative for photophobia and visual disturbance.   Respiratory: Negative for wheezing and stridor.    Cardiovascular: Negative for chest pain and palpitations.   Gastrointestinal: Negative for abdominal pain, diarrhea,.  She has nausea  Endocrine: Negative for cold intolerance and heat intolerance.   Genitourinary: Negative for dysuria and hematuria.   Musculoskeletal: Negative for joint swelling and neck stiffness.  Positive for back and leg pain.   Skin: Negative for color change and rash.   Neurological: Negative for seizures and syncope.   Hematological: Negative for adenopathy.        No obvious bleeding   Psychiatric/Behavioral: Negative for agitation, confusion and hallucinations. Positive for insomnia, anxiety, depression.      OBJECTIVE:    Vitals:    05/23/24 1148   BP: 127/79   Pulse: 98   Resp: 18   Temp: 98.3 °F (36.8 °C)   TempSrc: Infrared   SpO2: 93%   Weight: Comment: unable to obtain   Height: 160 cm (63\")   PainSc:   9   PainLoc: Generalized           Body mass index is 29.23 kg/m².    ECOG  (1) Restricted in physically strenuous " activity, ambulatory and able to do work of light nature    Physical Exam  Vitals and nursing note reviewed.   Constitutional:       General: She is not in acute distress.     Appearance: She is not diaphoretic.   HENT:      Head: Normocephalic and atraumatic.   Eyes:      General: No scleral icterus.        Right eye: No discharge.         Left eye: No discharge.      Conjunctiva/sclera: Conjunctivae normal.   Neck:      Thyroid: No thyromegaly.   Cardiovascular:      Rate and Rhythm: Normal rate and regular rhythm.      Heart sounds: Normal heart sounds. No friction rub. No gallop.    Bilateral lower extremity edema, 2+  Pulmonary:      Effort: Pulmonary effort is normal. No respiratory distress.      Breath sounds: No stridor. No wheezing.   Abdominal:      General: Bowel sounds are normal.      Palpations: Abdomen is soft. There is no mass.      Tenderness: There is no abdominal tenderness. There is no guarding or rebound.   Musculoskeletal:         General: No tenderness. Normal range of motion.      Cervical back: Normal range of motion and neck supple.   Lymphadenopathy:      Cervical: No cervical adenopathy.   Skin:     General: Skin is warm.      Findings: No erythema or rash.   Neurological:      Mental Status: She is alert and oriented to person, place, and time.      Motor: No abnormal muscle tone.   Psychiatric:         Behavior: Behavior    I have reexamined the patient and the results are consistent with the previously documented exam. Meghann Lerma MD      RECENT LABS    WBC   Date Value Ref Range Status   05/21/2024 17.35 (H) 3.40 - 10.80 10*3/mm3 Final     RBC   Date Value Ref Range Status   05/21/2024 3.56 (L) 3.77 - 5.28 10*6/mm3 Final     Hemoglobin   Date Value Ref Range Status   05/21/2024 9.5 (L) 12.0 - 15.9 g/dL Final     Hematocrit   Date Value Ref Range Status   05/21/2024 32.2 (L) 34.0 - 46.6 % Final     MCV   Date Value Ref Range Status   05/21/2024 90.4 79.0 - 97.0 fL Final      MCH   Date Value Ref Range Status   05/21/2024 26.7 26.6 - 33.0 pg Final     MCHC   Date Value Ref Range Status   05/21/2024 29.5 (L) 31.5 - 35.7 g/dL Final     RDW   Date Value Ref Range Status   05/21/2024 17.8 (H) 12.3 - 15.4 % Final     RDW-SD   Date Value Ref Range Status   05/21/2024 56.2 (H) 37.0 - 54.0 fl Final     MPV   Date Value Ref Range Status   05/21/2024 10.3 6.0 - 12.0 fL Final     Platelets   Date Value Ref Range Status   05/21/2024 441 140 - 450 10*3/mm3 Final     Neutrophil %   Date Value Ref Range Status   05/21/2024 72.7 42.7 - 76.0 % Final     Lymphocyte %   Date Value Ref Range Status   05/21/2024 16.7 (L) 19.6 - 45.3 % Final     Monocyte %   Date Value Ref Range Status   05/21/2024 7.8 5.0 - 12.0 % Final     Eosinophil %   Date Value Ref Range Status   05/21/2024 0.6 0.3 - 6.2 % Final     Basophil %   Date Value Ref Range Status   05/21/2024 2.2 (H) 0.0 - 1.5 % Final     External Neutrophils Abs   Date Value Ref Range Status   11/30/2022 7.6 (H) 1.7 - 6.0 x10(3)/ul Final     Neutrophils, Absolute   Date Value Ref Range Status   05/21/2024 12.59 (H) 1.70 - 7.00 10*3/mm3 Final     Lymphocytes, Absolute   Date Value Ref Range Status   05/21/2024 2.90 0.70 - 3.10 10*3/mm3 Final     Monocytes, Absolute   Date Value Ref Range Status   05/21/2024 1.36 (H) 0.10 - 0.90 10*3/mm3 Final     Eosinophils, Absolute   Date Value Ref Range Status   05/21/2024 0.11 0.00 - 0.40 10*3/mm3 Final     Basophils, Absolute   Date Value Ref Range Status   05/21/2024 0.39 (H) 0.00 - 0.20 10*3/mm3 Final     nRBC   Date Value Ref Range Status   05/08/2024 3.0 (H) 0.0 - 0.2 /100 WBC Final   12/04/2023 0.3 (H) 0.0 - 0.2 /100 WBC Final       Lab Results   Component Value Date    GLUCOSE 308 (H) 03/25/2024    BUN 22 (H) 03/25/2024    CREATININE 0.79 03/25/2024    EGFRIFNONA 133 02/02/2022    BCR 27.8 (H) 03/25/2024    K 5.3 (H) 03/25/2024    CO2 29.0 03/25/2024    CALCIUM 8.5 (L) 03/25/2024    ALBUMIN 3.6 03/19/2024     LABIL2 1.0 04/18/2019    AST 20 03/19/2024    ALT 17 03/19/2024           ASSESSMENT    Accelerated phase CML, status post bone marrow biopsy on 11/3/2022.  Patient was on ponatinib but developed significant pancytopenia and therefore treatment has been held.  Patient was not able to tolerate ponatinib despite significantly low doses at 10 mg every other day.  She is not now willing to retry this.  Will therefore restart ponatinib 10 mg every other day.  Continue ponatinib  Discontinue Sprycel due to lack of tolerance  Recent pneumonia  Hypercalcemia likely secondary to malignancy  PICC line care.  Consider transitioning to port down the line  Nausea vomiting secondary to Sprycel, likely she also has gastroparesis from longstanding uncontrolled diabetes: Will refer to GI for evaluation  Pain management issues, patient initially refusing to follow-up with her physician at [facility].  Patient has been seen by her pain physician.  She will continue follow-up with our pain physicians  Pancytopenia: Reviewed her CBC  BCR ABL kinase mutation assay was negative  Cardiomyopathy her most recent echo shows an EF of 44% which is an improvement from 26 to 30%.  Patient encouraged to follow-up with her cardiologist  Anxiety: Continue anxiolytics  Pancytopenia secondary to med, CML not requiring blood transfusion now.  For now continue to observe   CML in chronic phase with no significant hematologic or molecular or cytogenetic response on   Imatinib.  ABL kinase mutational analysis was negative.  We  have represcribed to Tasigna 300 mg twice a day.  Patient has been noncompliant with treatments and ended up in the hospital with hyperleukocytosis, peripheral blood blasts.  Bone marrow biopsy suggest that she remains in chronic phase.  Continue to Tasiigna at the current doses.  Hydroxyurea has been discontinued.  Uncontrolled diabetes type 1, followed at [facility] by   [facility]  Chronic anemia: Stable, multifactorial from CML, to Tasigna, hydroxyurea.  This has improved  Insomnia  Situational anxiety, not suicidal.  Continue Xanax 0.5 mg once daily  History of medical noncompliance  Pulmonary embolism: Xarelto restarted  Recent COVID-19 pneumonia  History of prolonged QTC      Plans    Continue ponatinib  She declines port placement  Bone scan reviewed and negative for any metastatic disease  Continue PICC line care.  Patient to have this done in the office today.  Referral placed to home health.  Continue weekly labs  She was discharged from the hospital on Reglan 10 mg p.o. 4 times a day for nausea  Increase mirtazapine to 30 mg p.o. nightly for depression and insomnia  Home health physical therapy ordered for bilateral hip osteoarthritis, Voltaren gel added  Follow-up with Dr. Lerma in 2 weeks as previously scheduled  Request additional records from [facility]  DC trazodone 25 mg due to QT prolongation  Continue to follow-up with cardiology in regards to dosing of her diuretics.  EKG reviewed.  She has slight prolongation of QT.  We will discontinue Zofran and Phenergan and use Compazine as needed for nausea.  Prescription has been sent to her pharmacy  Refill Xanax  Continue to follow-up with PCP per routine  Follow-up with pain management per routine  Refilled Lasix per hospital discharge notes.  Has an appointment to establish with PCP on 5/9/2024.  Follow-up 4 weeks          I spent 30 total minutes, face-to-face, caring for Hope today. 90% of this time involved counseling and/or coordination of care as documented within this note.

## 2024-05-21 NOTE — PROGRESS NOTES
Patient is here for PICC dressing change. Patient stated she gets weekly cbc's-lab drawn after 10 ml of waste and sent to lab for processing. Old PICC dressing removed-Patient has a small amount of rendness noted on the edge of the dressing-she stated it was where she probably itched it a little. Patient's stat lock was secure and clean- site cleaned per protocol and new biopatch placed and new dressing applied x 2. Skin prep was used prior to the mastisol and dressing. Patient stated her line had been out that far and looked the same. The number closest to the insertion site was approx 6.5. Cap was changed per protocol and line flushed prior to patient leaving and had good blood return noted and flushed with ease. Patient stated the dressing felt good and she was discharged. Dr. Lerma was notified about line being so far out and about her CBC results. Alessia was notified about patient needing more PICC dressing changes scheduled and patient did not have an MD appt but the patient stated someone had told her they were working on it.

## 2024-05-22 ENCOUNTER — TELEPHONE (OUTPATIENT)
Dept: ONCOLOGY | Facility: CLINIC | Age: 49
End: 2024-05-22
Payer: MEDICARE

## 2024-05-22 NOTE — TELEPHONE ENCOUNTER
Caller: Nieves Mc A    Relationship: Self    Best call back number: [phone]      What was the call regarding:PATIENT STATES THAT TRANSPORTATION ONLY RUNS FROM 10 AM TO 10 PM AND WANTED TO MAKE SURE THIS WAS CORRECT AND SHE WILL NEED A AWAY TO HER APPOINTMENT TOMORROW     PLEASE CALL TO ADVISE

## 2024-05-23 ENCOUNTER — HOSPITAL ENCOUNTER (OUTPATIENT)
Dept: ONCOLOGY | Facility: HOSPITAL | Age: 49
Discharge: HOME | End: 2024-05-23
Admitting: INTERNAL MEDICINE
Payer: MEDICARE

## 2024-05-23 ENCOUNTER — HOSPITAL ENCOUNTER (OUTPATIENT)
Dept: ONCOLOGY | Facility: HOSPITAL | Age: 49
Discharge: HOME | End: 2024-05-23
Payer: MEDICARE

## 2024-05-23 ENCOUNTER — OFFICE VISIT (OUTPATIENT)
Dept: ONCOLOGY | Facility: CLINIC | Age: 49
End: 2024-05-23
Payer: MEDICARE

## 2024-05-23 VITALS
HEIGHT: 63 IN | HEART RATE: 98 BPM | SYSTOLIC BLOOD PRESSURE: 127 MMHG | TEMPERATURE: 98.3 F | DIASTOLIC BLOOD PRESSURE: 79 MMHG | BODY MASS INDEX: 29.23 KG/M2 | RESPIRATION RATE: 18 BRPM | OXYGEN SATURATION: 93 %

## 2024-05-23 DIAGNOSIS — C92.10 CML (CHRONIC MYELOCYTIC LEUKEMIA): Primary | ICD-10-CM

## 2024-05-23 DIAGNOSIS — F41.9 ANXIETY: ICD-10-CM

## 2024-05-23 DIAGNOSIS — C92.10 CML (CHRONIC MYELOCYTIC LEUKEMIA): ICD-10-CM

## 2024-05-23 DIAGNOSIS — Z45.2 PICC (PERIPHERALLY INSERTED CENTRAL CATHETER) FLUSH: Primary | ICD-10-CM

## 2024-05-23 DIAGNOSIS — E87.5 HYPERKALEMIA: ICD-10-CM

## 2024-05-23 DIAGNOSIS — E86.0 DEHYDRATION: Primary | ICD-10-CM

## 2024-05-23 LAB
ALBUMIN SERPL-MCNC: 3.9 G/DL (ref 3.5–5.2)
ALBUMIN/GLOB SERPL: 1.1 G/DL
ALP SERPL-CCNC: 375 U/L (ref 39–117)
ALT SERPL W P-5'-P-CCNC: 80 U/L (ref 1–33)
ANION GAP SERPL CALCULATED.3IONS-SCNC: 8.6 MMOL/L (ref 5–15)
AST SERPL-CCNC: 50 U/L (ref 1–32)
BILIRUB SERPL-MCNC: 1 MG/DL (ref 0–1.2)
BUN SERPL-MCNC: 16 MG/DL (ref 6–20)
BUN/CREAT SERPL: 22.2 (ref 7–25)
CALCIUM SPEC-SCNC: 9.3 MG/DL (ref 8.6–10.5)
CHLORIDE SERPL-SCNC: 104 MMOL/L (ref 98–107)
CO2 SERPL-SCNC: 25.4 MMOL/L (ref 22–29)
CREAT SERPL-MCNC: 0.72 MG/DL (ref 0.57–1)
EGFRCR SERPLBLD CKD-EPI 2021: 103.3 ML/MIN/1.73
GLOBULIN UR ELPH-MCNC: 3.4 GM/DL
GLUCOSE SERPL-MCNC: 160 MG/DL (ref 65–99)
POTASSIUM SERPL-SCNC: 5.3 MMOL/L (ref 3.5–5.2)
PROT SERPL-MCNC: 7.3 G/DL (ref 6–8.5)
SODIUM SERPL-SCNC: 138 MMOL/L (ref 136–145)

## 2024-05-23 PROCEDURE — 36592 COLLECT BLOOD FROM PICC: CPT

## 2024-05-23 PROCEDURE — 80053 COMPREHEN METABOLIC PANEL: CPT | Performed by: INTERNAL MEDICINE

## 2024-05-23 RX ORDER — SODIUM CHLORIDE 0.9 % (FLUSH) 0.9 %
20 SYRINGE (ML) INJECTION AS NEEDED
Status: DISCONTINUED | OUTPATIENT
Start: 2024-05-23 | End: 2024-05-24 | Stop reason: HOSPADM

## 2024-05-23 RX ORDER — ALPRAZOLAM 0.5 MG/1
0.5 TABLET ORAL NIGHTLY PRN
Qty: 30 TABLET | Refills: 0 | Status: SHIPPED | OUTPATIENT
Start: 2024-05-23

## 2024-05-23 RX ORDER — SODIUM CHLORIDE 0.9 % (FLUSH) 0.9 %
20 SYRINGE (ML) INJECTION AS NEEDED
OUTPATIENT
Start: 2024-05-23

## 2024-05-23 RX ADMIN — Medication 20 ML: at 13:07

## 2024-05-23 NOTE — PROGRESS NOTES
Pt brought over from Dr. Lerma f/u appointment for PICC labs.  Left single lumen PICC in place and flushed with 10cc n/s with 10cc blood wasted and 10cc blood drawn for lab collection.  Flushed with 20cc n/s and alcohol cap applied.  Pt tolerated well.  Pt d/c with AVS given to her by Dr. Lerma staff.

## 2024-05-28 ENCOUNTER — SPECIALTY PHARMACY (OUTPATIENT)
Dept: PHARMACY | Facility: HOSPITAL | Age: 49
End: 2024-05-28
Payer: MEDICARE

## 2024-06-03 ENCOUNTER — HOSPITAL ENCOUNTER (OUTPATIENT)
Dept: ONCOLOGY | Facility: HOSPITAL | Age: 49
Discharge: HOME | End: 2024-06-03
Admitting: INTERNAL MEDICINE
Payer: MEDICARE

## 2024-06-03 DIAGNOSIS — C92.10 CML (CHRONIC MYELOCYTIC LEUKEMIA): ICD-10-CM

## 2024-06-03 LAB
ALBUMIN SERPL-MCNC: 4.1 G/DL (ref 3.5–5.2)
ALBUMIN/GLOB SERPL: 1.2 G/DL
ALP SERPL-CCNC: 402 U/L (ref 39–117)
ALT SERPL W P-5'-P-CCNC: 61 U/L (ref 1–33)
ANION GAP SERPL CALCULATED.3IONS-SCNC: 13.8 MMOL/L (ref 5–15)
AST SERPL-CCNC: 33 U/L (ref 1–32)
BASOPHILS # BLD AUTO: 0.06 10*3/MM3 (ref 0–0.2)
BASOPHILS NFR BLD AUTO: 0.8 % (ref 0–1.5)
BILIRUB SERPL-MCNC: 0.7 MG/DL (ref 0–1.2)
BUN SERPL-MCNC: 35 MG/DL (ref 6–20)
BUN/CREAT SERPL: 39.3 (ref 7–25)
CALCIUM SPEC-SCNC: 9.2 MG/DL (ref 8.6–10.5)
CHLORIDE SERPL-SCNC: 96 MMOL/L (ref 98–107)
CO2 SERPL-SCNC: 23.2 MMOL/L (ref 22–29)
CREAT SERPL-MCNC: 0.89 MG/DL (ref 0.57–1)
DEPRECATED RDW RBC AUTO: 54.8 FL (ref 37–54)
EGFRCR SERPLBLD CKD-EPI 2021: 80.1 ML/MIN/1.73
EOSINOPHIL # BLD AUTO: 0.05 10*3/MM3 (ref 0–0.4)
EOSINOPHIL NFR BLD AUTO: 0.7 % (ref 0.3–6.2)
ERYTHROCYTE [DISTWIDTH] IN BLOOD BY AUTOMATED COUNT: 17.4 % (ref 12.3–15.4)
GLOBULIN UR ELPH-MCNC: 3.5 GM/DL
GLUCOSE SERPL-MCNC: 594 MG/DL (ref 65–99)
HCT VFR BLD AUTO: 30.7 % (ref 34–46.6)
HGB BLD-MCNC: 8.9 G/DL (ref 12–15.9)
LYMPHOCYTES # BLD AUTO: 0.97 10*3/MM3 (ref 0.7–3.1)
LYMPHOCYTES NFR BLD AUTO: 13.2 % (ref 19.6–45.3)
MCH RBC QN AUTO: 26.5 PG (ref 26.6–33)
MCHC RBC AUTO-ENTMCNC: 29 G/DL (ref 31.5–35.7)
MCV RBC AUTO: 91.4 FL (ref 79–97)
MONOCYTES # BLD AUTO: 0.38 10*3/MM3 (ref 0.1–0.9)
MONOCYTES NFR BLD AUTO: 5.2 % (ref 5–12)
NEUTROPHILS NFR BLD AUTO: 5.88 10*3/MM3 (ref 1.7–7)
NEUTROPHILS NFR BLD AUTO: 80.1 % (ref 42.7–76)
PLATELET # BLD AUTO: 175 10*3/MM3 (ref 140–450)
PMV BLD AUTO: 10.7 FL (ref 6–12)
POTASSIUM SERPL-SCNC: 5.1 MMOL/L (ref 3.5–5.2)
PROT SERPL-MCNC: 7.6 G/DL (ref 6–8.5)
RBC # BLD AUTO: 3.36 10*6/MM3 (ref 3.77–5.28)
SODIUM SERPL-SCNC: 133 MMOL/L (ref 136–145)
WBC NRBC COR # BLD AUTO: 7.34 10*3/MM3 (ref 3.4–10.8)

## 2024-06-03 PROCEDURE — 85025 COMPLETE CBC W/AUTO DIFF WBC: CPT | Performed by: INTERNAL MEDICINE

## 2024-06-03 PROCEDURE — 80053 COMPREHEN METABOLIC PANEL: CPT | Performed by: INTERNAL MEDICINE

## 2024-06-03 PROCEDURE — G0463 HOSPITAL OUTPT CLINIC VISIT: HCPCS

## 2024-06-03 NOTE — PROGRESS NOTES
Patient came in for PICC line dressing change. Patient wants picc removed. She is tired of dealing with it and would prefer to get stuck for labs. Spoke with jaziel Pulido to remove picc line. Let Dr. Lerma know she has significant swelling in her feet and legs and per patient her stomach. Denies SOB. Per Dr. Lerma patient is already on lasix 40 and needs to follow up with cardiology. Patient verbalized understanding and was instructed to call with further issues. PICC line removed. Labs drawn from picc prior to removal.            6/3/2024      Called patient to notify her of blood sugar 598.  She was asked to call her PCP immediately to address the elevated blood sugar        Electronically signed by Meghann Lerma MD, 06/03/24, 5:59 PM EDT.    10

## 2024-06-04 ENCOUNTER — SPECIALTY PHARMACY (OUTPATIENT)
Dept: PHARMACY | Facility: HOSPITAL | Age: 49
End: 2024-06-04
Payer: MEDICARE

## 2024-06-11 DIAGNOSIS — C92.10 BLAST CRISIS PHASE OF CHRONIC MYELOID LEUKEMIA: ICD-10-CM

## 2024-06-11 DIAGNOSIS — F41.8 DEPRESSION WITH ANXIETY: ICD-10-CM

## 2024-06-11 DIAGNOSIS — C92.10 CML (CHRONIC MYELOCYTIC LEUKEMIA): ICD-10-CM

## 2024-06-11 DIAGNOSIS — F41.9 ANXIETY: ICD-10-CM

## 2024-06-11 RX ORDER — INSULIN LISPRO 100 [IU]/ML
7 INJECTION, SOLUTION INTRAVENOUS; SUBCUTANEOUS
Qty: 15 ML | Refills: 2 | Status: CANCELLED | OUTPATIENT
Start: 2024-06-11

## 2024-06-11 RX ORDER — PEN NEEDLE, DIABETIC 30 GX3/16"
1 NEEDLE, DISPOSABLE MISCELLANEOUS
Qty: 200 EACH | Refills: 2 | Status: CANCELLED | OUTPATIENT
Start: 2024-06-11

## 2024-06-11 RX ORDER — MIRTAZAPINE 30 MG/1
30 TABLET, FILM COATED ORAL
Qty: 30 TABLET | Refills: 2 | Status: SHIPPED | OUTPATIENT
Start: 2024-06-11

## 2024-06-11 RX ORDER — PONATINIB HYDROCHLORIDE 10 MG/1
10 TABLET, FILM COATED ORAL EVERY OTHER DAY
Qty: 30 TABLET | Refills: 2 | Status: CANCELLED | OUTPATIENT
Start: 2024-06-11

## 2024-06-11 RX ORDER — ALPRAZOLAM 0.5 MG/1
0.5 TABLET ORAL NIGHTLY PRN
Qty: 30 TABLET | Refills: 0 | OUTPATIENT
Start: 2024-06-11

## 2024-06-11 RX ORDER — FUROSEMIDE 40 MG/1
40 TABLET ORAL DAILY
Qty: 30 TABLET | Refills: 2 | OUTPATIENT
Start: 2024-06-11

## 2024-06-11 RX ORDER — MIRTAZAPINE 30 MG/1
30 TABLET, FILM COATED ORAL
Qty: 30 TABLET | Refills: 2 | OUTPATIENT
Start: 2024-06-11

## 2024-06-19 ENCOUNTER — TELEPHONE (OUTPATIENT)
Dept: ONCOLOGY | Facility: CLINIC | Age: 49
End: 2024-06-19

## 2024-06-19 NOTE — PROGRESS NOTES
Hematology/Oncology Outpatient Follow Up    PATIENT NAME:Nieves Mc  :1975  MRN: [id number]  PRIMARY CARE PHYSICIAN: Pankaj Stover MD  REFERRING PHYSICIAN: Pankaj Stover MD    Chief Complaint   Patient presents with    Follow-up     CML (chronic myelocytic leukemia)        HISTORY OF PRESENT ILLNESS:         Patient is a 48 y.o. female with PMH significant for CML on treatment with Imatinib.  Patient stated that she typically feels poorly with Imatinib with frequent nausea and vomiting.  Also complained of weakness and fatigue.  Patient presented to ED on 10/22/18 with complaints of an elevated blood sugar around 400.  Stated she felt poorly and has had a productive cough with yellow sputum.  Complained of nausea and stated she was unable to keep any food down anytime she ate.  The patient reported subjective fever without chills.  She stated she had been coughing so hard that she was vomiting.  She denied hematemesis.  Denied diarrhea, constipation or blood in her stool.       Patient’s chest x-ray showed no evidence of acute pulmonary embolus.  The patient has ground-glass opacities throughout the lungs.  There is some air trapping noted which would appear to be   infectious.  Patient was started on antibiotics.      Hem/Onc was consulted on 10/23/18 for co-management of patient with CML.  Patient was seen by Dr. Lerma on 10/12 on previous admission for patient reported CML on Imatinib (Gleevec).  Patient reports she is under the care of Dr. Roach at [facility] in [place].  Patient was seen by Dr. Lerma in May 2018 when patient presented with hematuria, which was attributed to her Imatinib.  Dr. Lerma followed during hospitalization but then patient returned to Dr. Roach for her usual follow up.  She was again seen on 10/12.  Patient is stating she will switch to Dr. Lerma.    Review of Dr. Roach’s note:  On 18 patient had BCR-abl which was 61.326.  Patient had been  on Imatinib 400 mg daily.  She had presented in March 2018 with WBC of 24, hemoglobin 13.1, platelet count of 1,067,000.  Patient apparently had follow up BCR-abl in August 2018, results are not available for review.  Her bone marrow aspiration and biopsy was also performed at that time is currently not available for review.    11/6/18 - .  Uric acid 6.5.  BCR-abl by RT-PCR was measured at 3.36%.    Due to thrombocytosis, patient was placed on Hydroxyurea 500 mg twice a day on 12/11/18.  Platelet count juana to 900,000.  Patient was noncompliant with CBCs that were recommended.    Patient was asked to return to the office after not being able to reach her.   12/27/18 - Bone marrow aspiration and biopsy was consistent with chronic myeloid leukemia.  BCR-abl positive, chronic phase.  ABL kinase mutational analysis is currently pending on the bone marrow.    1/3/19 - Repeat CBC, WBC 17, hemoglobin 11.9, platelet count 1,072,000.  Hydroxyurea was increased to 1 gm twice a day with follow up CBC.    1/6/19 - Follow up CBC showed progressive increase in platelet to 1.1 million.  Patient was offered admission to the hospital, which she declined.  Hydroxyurea was increased to 1 gm three   times a day as of 1/6/19.   1/7/19 - EKG shows sinus tachycardia.   milliseconds.    ABL kinase on peripheral blood was ordered on 1/11/19.  Based on sequence analysis, there was no mutation detected.    2/12/19 - Patient was initiated on Tasigna 300 mg twice a day.  2/13/19 - Urinalysis was negative for hematuria.   2/22/19 -  milliseconds.  3/13/19 - EKG with QTC is 413 milliseconds.  Nonspecific changes noted.    4/18/19 - EKG showed QTC prolonged to 453 milliseconds.  This is changed from prior.  4/18/19 - Free T4 normal at 0.91.  Magnesium was 1.7.  BUN is 6.  Creatinine 0.7.  Bilirubin 2.2.  Phosphorous normal 3.7.  TSH 0.43, normal.  Free T3 was normal at 3.47.  Ionized calcium   normal at 1.23.  BCR-abl was  0.522%.    5/7/19 - EKG showed QTC of 441 milliseconds.  QT was 366.     Patient has been lost to follow-up since 2019 for CML.  Patient states that she lost her insurance.  She also does not have any primary care physician at this time.  She is diabetic on insulin.  Patient was recently admitted to the hospital for pneumonia due to COVID-19 infection.  At that time patient made a decision to become compliant with treatment.  She is currently on to Cigna 300 mg p.o. twice a day.  She is also on hydroxyurea 1 g twice a day.  Overall she feels better, she has more energy.  3/1/2022 white count is 53.92, hemoglobin 11.7 and platelets are 472    6/1/2022: Patient has not been seen since March 2022.  Also verified that she has not been taking to Tasigna since February 2022.  She comes in today with cough for 1 and half months, shortness of breath, denies fevers.  6/30/2022 patient had 2D echocardiogram which showed an EF of 41 to 45%.  Left ventricular cavity is mildly dilated.  She was diagnosed with nonischemic cardiomyopathy    11/18/2022: Patient was transferred from [facility] [place] to [facility] due to cardiac failure.  She was then seen by NP [facility] hematology department.  Patient underwent tumor aspiration and biopsy at that time did not show chronic myeloid leukemia in accelerated phase markedly hypercellular marrow with megakaryocytic and myeloid hyperplasia with atypia and increased basophils blasts are not increased less than 1% moderate reticulin fibrosis.  According to the patient hydroxyurea was discontinued she was prescribed Gleevec 600 mg daily but she has only been taking 400 mg as she cannot find other milligram tablets.  She is already been pump inserted into her pelvic area.  She continues to experience nausea which resulted in her not taking the baclofen she should.  1/12/2023: WBC 17.64, hemoglobin 10.2, MCV 88.8, platelets 458,000.  On Gleevec 600 mg daily and hydroxyurea  500 mg twice daily.  2023: WBC 10.67, hemoglobin 10.0, MCV 86.4, platelets 370,000.  Patient on Gleevec 600 mg daily and hydroxyurea 500 mg once a day.  Patient instructed at the visit to discontinue her hydroxyurea.  2023: WBC 17.75, hemoglobin 10.4, MCV 87.2, platelets 425,000.  On Gleevec 600 mg daily.  3/10/2023: 2D echocardiogram showing EF of 44% EKG showing sinus tachycardia QTc of 491  10/5/2023: Patient was initiated on Dasatinib 100 mg p.o. daily  2023-2023: Patient hospitalized at [facility] due to rash that was thought to be secondary to Sprycel and elevated glucose.  Sprycel was held and patient was treated with a 5-day course of prednisone.  2023: WBC 3.10, hemoglobin 8.2, MCV 90.3, platelets 214,000, ANC 1100.    Past Medical History:   Diagnosis Date    Acute respiratory failure with hypoxia 2022    Adverse effect of chemotherapy 2023    Bilateral subdural hematomas 2023    Bone pain     Chronic constipation 2023    CML (chronic myelocytic leukemia) 2018    COVID-19 virus infection 2022    Diabetes mellitus     Diabetic gastroparesis 2023    Extremity pain     Carlin. legs pain    Fall during current hospitalization 2023    Leg pain     left leg greater    Medically noncompliant 2021    Migraine     Petechial rash 2023    Pubic ramus fracture, left, closed, initial encounter 2023    Pulmonary embolism     Traumatic subdural hemorrhage without loss of consciousness 2023    Tumor lysis syndrome 2022    UTI (urinary tract infection) 2023    Vision loss     doing surgery       Past Surgical History:   Procedure Laterality Date    BONE MARROW BIOPSY      BREAST SURGERY      BRONCHOSCOPY N/A 2022    Procedure: BRONCHOSCOPY bilateral lung washing;  Surgeon: Charlene Camara MD;  Location: [facility] ENDOSCOPY;  Service: Pulmonary;  Laterality: N/A;  post: rule out infection vs transfusion lung injury      SECTION      CHOLECYSTECTOMY      COLONOSCOPY N/A 6/29/2023    Procedure: COLONOSCOPY;  Surgeon: Freddy Villeda MD;  Location: [facility] ENDOSCOPY;  Service: Gastroenterology;  Laterality: N/A;  poor prep    ENDOSCOPY N/A 6/29/2023    Procedure: ESOPHAGOGASTRODUODENOSCOPY with biopsy x2 areas;  Surgeon: Freddy Villeda MD;  Location: [facility] ENDOSCOPY;  Service: Gastroenterology;  Laterality: N/A;  food in stomach;abnormal duodenal mucosa    EYE SURGERY      laser surgery due  to hemmorage--- 5/13/2021-- another surgery  lt eye11/15/21    RETINAL DETACHMENT SURGERY      SPINE SURGERY      Lombardi spinal block    TUBAL ABDOMINAL LIGATION           Current Outpatient Medications:     acetaminophen (TYLENOL) 325 MG tablet, Take 2 tablets by mouth Every 4 (Four) Hours As Needed for Moderate Pain. Indications: Fever, Pain, Disp: 60 tablet, Rfl: 3    ALPRAZolam (Xanax) 0.5 MG tablet, Take 1 tablet by mouth At Night As Needed for Anxiety. Indications: Feeling Anxious, Disp: 30 tablet, Rfl: 0    cetirizine (zyrTEC) 10 MG tablet, Take 1 tablet by mouth Daily., Disp: 30 tablet, Rfl: 0    Continuous Glucose Sensor (FreeStyle Zahira 2 Sensor) misc, Use 1 each 4 (Four) Times a Day Before Meals & at Bedtime. Dx code: E11.65, Disp: 2 each, Rfl: 2    dapagliflozin Propanediol (Farxiga) 10 MG tablet, Take 10 mg (1 tablet) by mouth Daily. Dx code: E11.65, Disp: 30 tablet, Rfl: 2    Diclofenac Sodium (VOLTAREN) 1 % gel gel, Apply 4 g topically to the appropriate area as directed 4 (Four) Times a Day As Needed (hip pain)., Disp: 100 g, Rfl: 2    furosemide (LASIX) 40 MG tablet, Take 1 tablet by mouth Daily. Indications: Edema, Disp: 30 tablet, Rfl: 2    gabapentin (Neurontin) 800 MG tablet, Take 1 tablet by mouth 3 (Three) Times a Day. Ordered through [facility]  Indications: Diabetes with Nerve Disease, Disp: 90 tablet, Rfl: 2    Insulin Glargine (LANTUS SOLOSTAR) 100 UNIT/ML injection pen, Inject 25 Units under the skin into the  appropriate area as directed Every Night. Dx code: E11.65, Disp: 15 mL, Rfl: 2    Insulin Lispro, 1 Unit Dial, (HumaLOG KwikPen) 100 UNIT/ML solution pen-injector, Inject 7 Units under the skin into the appropriate area as directed 3 (Three) Times a Day Before Meals. Dx code: E11.65, Disp: 15 mL, Rfl: 2    Insulin Pen Needle (Pen Needles) 32G X 4 MM misc, Use 1 each 4 (Four) Times a Day Before Meals & at Bedtime. Dx code: E11.65, Disp: 200 each, Rfl: 2    losartan (COZAAR) 25 MG tablet, Take 1 tablet by mouth Daily for 30 days., Disp: 30 tablet, Rfl: 0    metoclopramide (Reglan) 10 MG tablet, Take 1 tablet by mouth 4 (Four) Times a Day. Indications: Hiccups that are Hard to Cure, Nausea and Vomiting, Disp: 120 tablet, Rfl: 3    metoprolol succinate XL (TOPROL-XL) 25 MG 24 hr tablet, Take 1 tablet by mouth Every 12 (Twelve) Hours for 30 days., Disp: 60 tablet, Rfl: 0    mirtazapine (REMERON) 30 MG tablet, Take 1 tablet by mouth every night at bedtime. Indications: Major Depressive Disorder, Disp: 30 tablet, Rfl: 2    pain patient supplied pump, by Intrathecal route Continuous., Disp: , Rfl:     PONATinib HCl (Iclusig) 10 MG tablet, Take 10 mg by mouth Every Other Day. Take with or without food.  Swallow whole, do not crush or chew., Disp: 30 tablet, Rfl: 2    rivaroxaban (Xarelto) 10 MG tablet, Take 1 tablet by mouth Daily., Disp: 30 tablet, Rfl: 6    tiZANidine (ZANAFLEX) 4 MG tablet, Take 1 tablet by mouth Daily. Indications: Musculoskeletal Pain, Disp: 90 tablet, Rfl: 1  No current facility-administered medications for this visit.    Facility-Administered Medications Ordered in Other Visits:     acetaminophen (TYLENOL) tablet 650 mg, 650 mg, Oral, Once, Denisha Gill APRN    No Known Allergies    Family History   Problem Relation Age of Onset    Diabetes Mother     Diabetes Maternal Grandmother     Heart attack Maternal Grandmother     Stroke Maternal Grandmother        Cancer-related family history is not  "on file.    Social History     Tobacco Use    Smoking status: Never    Smokeless tobacco: Never   Vaping Use    Vaping status: Never Used   Substance Use Topics    Alcohol use: No    Drug use: No       I have reviewed and confirmed the accuracy of the patient's history: Chief complaint, HPI, ROS, and Subjective as entered by the MA/LPN/RN. Meghann Lerma MD 06/20/24          SUBJECTIVE:    Patient remains on ponatinib 10 mg every other day.  She denies any new issues    She denies any new issues      Hope NICHOLAS Mc reports a pain score of 8.  Given her pain assessment as noted, treatment options were discussed and the following options were decided upon as a follow-up plan to address the patient's pain: referral to Physical Therapy.     Prescription for Voltaren gel, continue pain management with specialist      .REVIEW OF SYSTEMS:    Review of Systems   Constitutional: Negative for chills and fever.   HENT: Negative for ear pain, mouth sores, nosebleeds and sore throat.    Eyes: Negative for photophobia and visual disturbance.   Respiratory: Negative for wheezing and stridor.    Cardiovascular: Negative for chest pain and palpitations.   Gastrointestinal: Negative for abdominal pain, diarrhea,.  She has nausea  Endocrine: Negative for cold intolerance and heat intolerance.   Genitourinary: Negative for dysuria and hematuria.   Musculoskeletal: Negative for joint swelling and neck stiffness.  Positive for back and leg pain.   Skin: Negative for color change and rash.   Neurological: Negative for seizures and syncope.   Hematological: Negative for adenopathy.        No obvious bleeding   Psychiatric/Behavioral: Negative for agitation, confusion and hallucinations. Positive for insomnia, anxiety, depression.      OBJECTIVE:    Vitals:    06/20/24 1128   BP: 131/84   Pulse: 101   Resp: 18   Temp: 98 °F (36.7 °C)   TempSrc: Infrared   SpO2: 95%   Weight: Comment: unable to stand   Height: 160 cm (63\")   PainSc:   " 8   PainLoc: Generalized             Body mass index is 29.23 kg/m².    ECOG  (1) Restricted in physically strenuous activity, ambulatory and able to do work of light nature    Physical Exam  Vitals and nursing note reviewed.   Constitutional:       General: She is not in acute distress.     Appearance: She is not diaphoretic.   HENT:      Head: Normocephalic and atraumatic.   Eyes:      General: No scleral icterus.        Right eye: No discharge.         Left eye: No discharge.      Conjunctiva/sclera: Conjunctivae normal.   Neck:      Thyroid: No thyromegaly.   Cardiovascular:      Rate and Rhythm: Normal rate and regular rhythm.      Heart sounds: Normal heart sounds. No friction rub. No gallop.    Bilateral lower extremity edema, 2+  Pulmonary:      Effort: Pulmonary effort is normal. No respiratory distress.      Breath sounds: No stridor. No wheezing.   Abdominal:      General: Bowel sounds are normal.      Palpations: Abdomen is soft. There is no mass.      Tenderness: There is no abdominal tenderness. There is no guarding or rebound.   Musculoskeletal:         General: No tenderness. Normal range of motion.      Cervical back: Normal range of motion and neck supple.   Lymphadenopathy:      Cervical: No cervical adenopathy.   Skin:     General: Skin is warm.      Findings: No erythema or rash.   Neurological:      Mental Status: She is alert and oriented to person, place, and time.      Motor: No abnormal muscle tone.   Psychiatric:         Behavior: Behavior      I have reexamined the patient and the results are consistent with the previously documented exam. Meghann Lerma MD      RECENT LABS    WBC   Date Value Ref Range Status   06/20/2024 3.83 3.40 - 10.80 10*3/mm3 Final     RBC   Date Value Ref Range Status   06/20/2024 3.61 (L) 3.77 - 5.28 10*6/mm3 Final     Hemoglobin   Date Value Ref Range Status   06/20/2024 9.9 (L) 12.0 - 15.9 g/dL Final     Hematocrit   Date Value Ref Range Status    06/20/2024 32.1 (L) 34.0 - 46.6 % Final     MCV   Date Value Ref Range Status   06/20/2024 88.9 79.0 - 97.0 fL Final     MCH   Date Value Ref Range Status   06/20/2024 27.4 26.6 - 33.0 pg Final     MCHC   Date Value Ref Range Status   06/20/2024 30.8 (L) 31.5 - 35.7 g/dL Final     RDW   Date Value Ref Range Status   06/20/2024 17.5 (H) 12.3 - 15.4 % Final     RDW-SD   Date Value Ref Range Status   06/20/2024 55.2 (H) 37.0 - 54.0 fl Final     MPV   Date Value Ref Range Status   06/20/2024 10.5 6.0 - 12.0 fL Final     Platelets   Date Value Ref Range Status   06/20/2024 82 (L) 140 - 450 10*3/mm3 Final     Neutrophil %   Date Value Ref Range Status   06/20/2024 61.9 42.7 - 76.0 % Final     Lymphocyte %   Date Value Ref Range Status   06/20/2024 31.9 19.6 - 45.3 % Final     Monocyte %   Date Value Ref Range Status   06/20/2024 3.9 (L) 5.0 - 12.0 % Final     Eosinophil %   Date Value Ref Range Status   06/20/2024 1.0 0.3 - 6.2 % Final     Basophil %   Date Value Ref Range Status   06/20/2024 1.3 0.0 - 1.5 % Final     External Neutrophils Abs   Date Value Ref Range Status   11/30/2022 7.6 (H) 1.7 - 6.0 x10(3)/ul Final     Neutrophils, Absolute   Date Value Ref Range Status   06/20/2024 2.37 1.70 - 7.00 10*3/mm3 Final     Lymphocytes, Absolute   Date Value Ref Range Status   06/20/2024 1.22 0.70 - 3.10 10*3/mm3 Final     Monocytes, Absolute   Date Value Ref Range Status   06/20/2024 0.15 0.10 - 0.90 10*3/mm3 Final     Eosinophils, Absolute   Date Value Ref Range Status   06/20/2024 0.04 0.00 - 0.40 10*3/mm3 Final     Basophils, Absolute   Date Value Ref Range Status   06/20/2024 0.05 0.00 - 0.20 10*3/mm3 Final     nRBC   Date Value Ref Range Status   05/08/2024 3.0 (H) 0.0 - 0.2 /100 WBC Final   12/04/2023 0.3 (H) 0.0 - 0.2 /100 WBC Final       Lab Results   Component Value Date    GLUCOSE 594 (C) 06/03/2024    BUN 35 (H) 06/03/2024    CREATININE 0.89 06/03/2024    EGFRIFNONA 133 02/02/2022    BCR 39.3 (H) 06/03/2024     K 5.1 06/03/2024    CO2 23.2 06/03/2024    CALCIUM 9.2 06/03/2024    ALBUMIN 4.1 06/03/2024    LABIL2 1.0 04/18/2019    AST 33 (H) 06/03/2024    ALT 61 (H) 06/03/2024           ASSESSMENT    Accelerated phase CML, status post bone marrow biopsy on 11/3/2022.  Patient was on ponatinib but developed significant pancytopenia and therefore treatment has been held.  Patient was not able to tolerate ponatinib despite significantly low doses at 10 mg every other day.  She is not now willing to retry this.  Will therefore restart ponatinib 10 mg every other day. Continue ponatinib.  Discontinue Sprycel due to lack of tolerance  Hypercalcemia likely secondary to malignancy  Nausea vomiting secondary to Sprycel, likely she also has gastroparesis from longstanding uncontrolled diabetes: Will refer to GI for evaluation  Pain management issues, patient initially refusing to follow-up with her physician at [facility].  Patient has been seen by her pain physician.  She will continue follow-up with our pain physicians  Pancytopenia: Reviewed  BCR ABL kinase mutation assay was negative  Cardiomyopathy her most recent echo shows an EF of 44% which is an improvement from 26 to 30%.  Patient encouraged to follow-up with her cardiologist  Anxiety: Continue anxiolytics  CML in chronic phase with no significant hematologic or molecular or cytogenetic response on   Imatinib.  ABL kinase mutational analysis was negative.  We  have represcribed to Tasigna 300 mg twice a day.  Patient has been noncompliant with treatments and ended up in the hospital with hyperleukocytosis, peripheral blood blasts.  Bone marrow biopsy suggest that she remains in chronic phase.  Continue to Tasiigna at the current doses.  Hydroxyurea has been discontinued.  Uncontrolled diabetes type 1, followed at the [facility] by Dr. Calles  Chronic anemia: Stable, multifactorial from CML, to Tasigna, hydroxyurea.  This has  improved  Insomnia  Situational anxiety, not suicidal.  Continue Xanax 0.5 mg once daily  History of medical noncompliance  Pulmonary embolism: Xarelto restarted  Recent COVID-19 pneumonia  History of prolonged QTC        Plans    Continue ponatinib  She declines port placement  Bone scan reviewed and negative for any metastatic disease  PICC line has been removed  Continue weekly labs  She was discharged from the hospital on Reglan 10 mg p.o. 4 times a day for nausea  Increase mirtazapine to 30 mg p.o. nightly for depression and insomnia  Home health physical therapy ordered for bilateral hip osteoarthritis, Voltaren gel added  Follow-up with Dr. Lerma in 2 weeks as previously scheduled  Request additional records from [facility]  DC trazodone 25 mg due to QT prolongation  Continue to follow-up with cardiology in regards to dosing of her diuretics.  EKG reviewed.  She has slight prolongation of QT.  We will discontinue Zofran and Phenergan and use Compazine as needed for nausea.  Prescription has been sent to her pharmacy  Refill Xanax  Continue to follow-up with PCP per routine  Follow-up with pain management per routine  Refilled Lasix per hospital discharge notes.  Has an appointment to establish with PCP on 5/9/2024.  Follow-up 4 weeks      I spent 30 total minutes, face-to-face, caring for Hope today. 90% of this time involved counseling and/or coordination of care as documented within this note.

## 2024-06-20 ENCOUNTER — OFFICE VISIT (OUTPATIENT)
Dept: ONCOLOGY | Facility: CLINIC | Age: 49
End: 2024-06-20
Payer: MEDICARE

## 2024-06-20 ENCOUNTER — LAB (OUTPATIENT)
Dept: LAB | Facility: HOSPITAL | Age: 49
End: 2024-06-20
Payer: MEDICARE

## 2024-06-20 VITALS
SYSTOLIC BLOOD PRESSURE: 131 MMHG | HEART RATE: 101 BPM | TEMPERATURE: 98 F | RESPIRATION RATE: 18 BRPM | DIASTOLIC BLOOD PRESSURE: 84 MMHG | HEIGHT: 63 IN | BODY MASS INDEX: 29.23 KG/M2 | OXYGEN SATURATION: 95 %

## 2024-06-20 DIAGNOSIS — C92.10 CML (CHRONIC MYELOCYTIC LEUKEMIA): ICD-10-CM

## 2024-06-20 DIAGNOSIS — C92.10 CML (CHRONIC MYELOCYTIC LEUKEMIA): Primary | ICD-10-CM

## 2024-06-20 LAB
BASOPHILS # BLD AUTO: 0.05 10*3/MM3 (ref 0–0.2)
BASOPHILS NFR BLD AUTO: 1.3 % (ref 0–1.5)
DEPRECATED RDW RBC AUTO: 55.2 FL (ref 37–54)
EOSINOPHIL # BLD AUTO: 0.04 10*3/MM3 (ref 0–0.4)
EOSINOPHIL NFR BLD AUTO: 1 % (ref 0.3–6.2)
ERYTHROCYTE [DISTWIDTH] IN BLOOD BY AUTOMATED COUNT: 17.5 % (ref 12.3–15.4)
HCT VFR BLD AUTO: 32.1 % (ref 34–46.6)
HGB BLD-MCNC: 9.9 G/DL (ref 12–15.9)
LYMPHOCYTES # BLD AUTO: 1.22 10*3/MM3 (ref 0.7–3.1)
LYMPHOCYTES NFR BLD AUTO: 31.9 % (ref 19.6–45.3)
MCH RBC QN AUTO: 27.4 PG (ref 26.6–33)
MCHC RBC AUTO-ENTMCNC: 30.8 G/DL (ref 31.5–35.7)
MCV RBC AUTO: 88.9 FL (ref 79–97)
MONOCYTES # BLD AUTO: 0.15 10*3/MM3 (ref 0.1–0.9)
MONOCYTES NFR BLD AUTO: 3.9 % (ref 5–12)
NEUTROPHILS NFR BLD AUTO: 2.37 10*3/MM3 (ref 1.7–7)
NEUTROPHILS NFR BLD AUTO: 61.9 % (ref 42.7–76)
PLATELET # BLD AUTO: 82 10*3/MM3 (ref 140–450)
PMV BLD AUTO: 10.5 FL (ref 6–12)
RBC # BLD AUTO: 3.61 10*6/MM3 (ref 3.77–5.28)
WBC NRBC COR # BLD AUTO: 3.83 10*3/MM3 (ref 3.4–10.8)

## 2024-06-20 PROCEDURE — 85025 COMPLETE CBC W/AUTO DIFF WBC: CPT

## 2024-06-20 PROCEDURE — 36415 COLL VENOUS BLD VENIPUNCTURE: CPT

## 2024-06-21 ENCOUNTER — SPECIALTY PHARMACY (OUTPATIENT)
Dept: PHARMACY | Facility: HOSPITAL | Age: 49
End: 2024-06-21
Payer: MEDICARE

## 2024-06-23 RX ORDER — PEN NEEDLE, DIABETIC 30 GX3/16"
1 NEEDLE, DISPOSABLE MISCELLANEOUS
Qty: 200 EACH | Refills: 2 | Status: CANCELLED | OUTPATIENT
Start: 2024-06-23

## 2024-06-23 RX ORDER — INSULIN LISPRO 100 [IU]/ML
7 INJECTION, SOLUTION INTRAVENOUS; SUBCUTANEOUS
Qty: 15 ML | Refills: 2 | Status: CANCELLED | OUTPATIENT
Start: 2024-06-23

## 2024-06-23 RX ORDER — DAPAGLIFLOZIN 10 MG/1
10 TABLET, FILM COATED ORAL DAILY
Qty: 30 TABLET | Refills: 2 | Status: CANCELLED | OUTPATIENT
Start: 2024-06-23

## 2024-06-24 ENCOUNTER — TELEPHONE (OUTPATIENT)
Dept: FAMILY MEDICINE CLINIC | Facility: CLINIC | Age: 49
End: 2024-06-24
Payer: MEDICARE

## 2024-06-24 RX ORDER — INSULIN LISPRO 100 [IU]/ML
7 INJECTION, SOLUTION INTRAVENOUS; SUBCUTANEOUS
Qty: 15 ML | Refills: 2 | Status: CANCELLED | OUTPATIENT
Start: 2024-06-23

## 2024-06-24 RX ORDER — PEN NEEDLE, DIABETIC 30 GX3/16"
1 NEEDLE, DISPOSABLE MISCELLANEOUS
Qty: 200 EACH | Refills: 2 | Status: SHIPPED | OUTPATIENT
Start: 2024-06-24

## 2024-06-24 RX ORDER — INSULIN LISPRO 100 [IU]/ML
7 INJECTION, SOLUTION INTRAVENOUS; SUBCUTANEOUS
Qty: 15 ML | Refills: 2 | Status: SHIPPED | OUTPATIENT
Start: 2024-06-24

## 2024-06-24 RX ORDER — DAPAGLIFLOZIN 10 MG/1
10 TABLET, FILM COATED ORAL DAILY
Qty: 30 TABLET | Refills: 2 | Status: SHIPPED | OUTPATIENT
Start: 2024-06-24

## 2024-06-24 NOTE — TELEPHONE ENCOUNTER
Caller: Nieves Mc A    Relationship: Self    Best call back number: [phone] (Mobile)     What medication are you requesting: SOMETHING FOR SWELLING    What are your current symptoms: SWELLING IN LEGS AND STOMACH- DR SCOTT GAVE PATIENT FUROSEMIDE 40MG BUT PATIENT STATES THIS IS NOT HELPING AND DR SCOTT TOLD PATIENT TO CONTACT DR EVANS REGARDING THIS ISSUE- PATIENT WAS ALSO TOLD THAT HER SPLEEN IS SWOLLEN DUE TO THE CHEMO SHE IS ON    PATIENT STATES THIS HAS HAPPENED BEFORE BUT THE FUROSEMIDE HAS WORKED AND THIS TIME IT IS NOT AND NOW HER LEGS ARE SO SWOLLEN THEY ARE VERY TIGHT    How long have you been experiencing symptoms: 2 WEEKS    Have you had these symptoms before:    [x] Yes  [] No    Have you been treated for these symptoms before:   [x] Yes  [] No    If a prescription is needed, what is your preferred pharmacy and phone number:  [id number] - [place], [place] - [address] [phone] [facility] [phone] FX     Additional notes: PATIENT IS ASKING FOR THIS TO BE SENT TO THE PHARMACY ASAP    PLEASE ADVISE PATIENT REGARDING THIS ASAP

## 2024-06-24 NOTE — TELEPHONE ENCOUNTER
Caller: Nieves Mc    Relationship: Self    Best call back number:     [phone] (Mobile)     Requested Prescriptions:   Requested Prescriptions     Pending Prescriptions Disp Refills    Insulin Glargine (LANTUS SOLOSTAR) 100 UNIT/ML injection pen 15 mL 2     Sig: Inject 25 Units under the skin into the appropriate area as directed Every Night. Dx code: E11.65    Insulin Lispro, 1 Unit Dial, (HumaLOG KwikPen) 100 UNIT/ML solution pen-injector 15 mL 2     Sig: Inject 7 Units under the skin into the appropriate area as directed 3 (Three) Times a Day Before Meals. Dx code: E11.65        Pharmacy where request should be sent: [id number] - [address] [phone] [facility] [phone]      Last office visit with prescribing clinician: 5/9/2024   Last telemedicine visit with prescribing clinician: Visit date not found   Next office visit with prescribing clinician: 7/9/2024     Additional details provided by patient: PATIENT STATES SHE IS COMPLETELY OUT OF MEDICATION AND NEEDS REFILLED ASAP BECAUSE HER BLOOD SUGAR IS RUNNING HIGH    PLEASE ADVISE PATIENT REGARDING THIS REQUEST ASAP    Does the patient have less than a 3 day supply:  [x] Yes  [] No    Would you like a call back once the refill request has been completed: [x] Yes [] No    If the office needs to give you a call back, can they leave a voicemail: [x] Yes [] No    Olga Feldman   06/24/24 11:02 EDT

## 2024-06-25 NOTE — TELEPHONE ENCOUNTER
Patient should be evaluated.  Not appropriate to prescribe medications without evaluation.  If it is an urgent matter, patient should be seen in urgent care or emergency department where they would be able to evaluate, obtain labs, potential imaging, and pursue appropriate treatment more quickly.    Pankaj Stover MD

## 2024-06-26 NOTE — TELEPHONE ENCOUNTER
Gave message to patient at 8:57am. She said she has an appt with Dr Stover on 07/09/2024 and will wait until then.

## 2024-06-27 DIAGNOSIS — C92.10 CML (CHRONIC MYELOCYTIC LEUKEMIA): ICD-10-CM

## 2024-06-27 DIAGNOSIS — F41.9 ANXIETY: ICD-10-CM

## 2024-06-27 RX ORDER — ALPRAZOLAM 0.5 MG/1
0.5 TABLET ORAL NIGHTLY PRN
Qty: 30 TABLET | Refills: 0 | Status: CANCELLED | OUTPATIENT
Start: 2024-06-27

## 2024-06-28 RX ORDER — FUROSEMIDE 40 MG/1
40 TABLET ORAL DAILY
Qty: 30 TABLET | Refills: 2 | Status: SHIPPED | OUTPATIENT
Start: 2024-06-28

## 2024-06-30 ENCOUNTER — PATIENT MESSAGE (OUTPATIENT)
Dept: FAMILY MEDICINE CLINIC | Facility: CLINIC | Age: 49
End: 2024-06-30
Payer: MEDICARE

## 2024-07-02 ENCOUNTER — SPECIALTY PHARMACY (OUTPATIENT)
Dept: PHARMACY | Facility: HOSPITAL | Age: 49
End: 2024-07-02
Payer: MEDICARE

## 2024-07-02 DIAGNOSIS — C92.10 BLAST CRISIS PHASE OF CHRONIC MYELOID LEUKEMIA: Primary | ICD-10-CM

## 2024-07-02 DIAGNOSIS — C92.10 CML (CHRONIC MYELOCYTIC LEUKEMIA): ICD-10-CM

## 2024-07-02 NOTE — PROGRESS NOTES
Re: Refills of Oral Specialty Medication - Iclusig (ponatinib)    Drug-Drug Interactions: The current medication list was reviewed and there are no new relevant drug-drug interactions with the specialty medication.  Medication Allergies: The patient has NKDA  Review of Labs/Dose Adjustments: NO DOSE CHANGE - I reviewed the most recent note and labs and the patient will continue without any dose changes.  I sent refills as described below.    Drug: Iclusig (ponatinib)  Strength: 10 mg  Directions: Take 10 mg by mouth  every other day.  Take with or without food.  Quantity: 30  Refills: 2  Pharmacy prescription sent to: [facility] upon MD signature    Name/Credentials Ros CALHOUN PharmD      7/2/2024  14:22 EDT

## 2024-07-03 DIAGNOSIS — C92.10 BLAST CRISIS PHASE OF CHRONIC MYELOID LEUKEMIA: ICD-10-CM

## 2024-07-03 DIAGNOSIS — C92.10 CML (CHRONIC MYELOCYTIC LEUKEMIA): Primary | ICD-10-CM

## 2024-07-05 DIAGNOSIS — F41.9 ANXIETY: ICD-10-CM

## 2024-07-05 DIAGNOSIS — C92.10 CML (CHRONIC MYELOCYTIC LEUKEMIA): ICD-10-CM

## 2024-07-05 RX ORDER — PONATINIB HYDROCHLORIDE 10 MG/1
10 TABLET, FILM COATED ORAL EVERY OTHER DAY
Qty: 30 TABLET | Refills: 2 | Status: SHIPPED | OUTPATIENT
Start: 2024-07-05

## 2024-07-05 NOTE — TELEPHONE ENCOUNTER
Caller: Nieves Mc    Relationship: Self    Best call back number: [phone]    Requested Prescriptions:   Requested Prescriptions     Pending Prescriptions Disp Refills    ALPRAZolam (Xanax) 0.5 MG tablet 30 tablet 0     Sig: Take 1 tablet by mouth At Night As Needed for Anxiety. Indications: Feeling Anxious        Pharmacy where request should be sent: [facility]ARMACY #[id number] - [address] [phone] [facility] [phone]      Last office visit with prescribing clinician: 6/20/2024   Last telemedicine visit with prescribing clinician: Visit date not found   Next office visit with prescribing clinician: 7/18/2024     Additional details provided by patient:     Does the patient have less than a 3 day supply:  [x] Yes  [] No    Would you like a call back once the refill request has been completed: [] Yes [x] No    If the office needs to give you a call back, can they leave a voicemail: [] Yes [x] No    Olga Velazquez Rep   07/05/24 16:26 EDT

## 2024-07-09 ENCOUNTER — LAB (OUTPATIENT)
Dept: FAMILY MEDICINE CLINIC | Facility: CLINIC | Age: 49
End: 2024-07-09
Payer: MEDICARE

## 2024-07-09 ENCOUNTER — OFFICE VISIT (OUTPATIENT)
Dept: FAMILY MEDICINE CLINIC | Facility: CLINIC | Age: 49
End: 2024-07-09
Payer: MEDICARE

## 2024-07-09 VITALS
HEART RATE: 107 BPM | OXYGEN SATURATION: 96 % | SYSTOLIC BLOOD PRESSURE: 132 MMHG | BODY MASS INDEX: 29.23 KG/M2 | DIASTOLIC BLOOD PRESSURE: 78 MMHG | HEIGHT: 63 IN | RESPIRATION RATE: 16 BRPM

## 2024-07-09 DIAGNOSIS — F41.8 DEPRESSION WITH ANXIETY: ICD-10-CM

## 2024-07-09 DIAGNOSIS — Z79.4 TYPE 2 DIABETES MELLITUS WITH DIABETIC POLYNEUROPATHY, WITH LONG-TERM CURRENT USE OF INSULIN: Primary | Chronic | ICD-10-CM

## 2024-07-09 DIAGNOSIS — E11.42 TYPE 2 DIABETES MELLITUS WITH DIABETIC POLYNEUROPATHY, WITH LONG-TERM CURRENT USE OF INSULIN: Primary | Chronic | ICD-10-CM

## 2024-07-09 DIAGNOSIS — E11.42 TYPE 2 DIABETES MELLITUS WITH DIABETIC POLYNEUROPATHY, WITH LONG-TERM CURRENT USE OF INSULIN: Chronic | ICD-10-CM

## 2024-07-09 DIAGNOSIS — M70.22 OLECRANON BURSITIS OF LEFT ELBOW: ICD-10-CM

## 2024-07-09 DIAGNOSIS — Z79.4 TYPE 2 DIABETES MELLITUS WITH DIABETIC POLYNEUROPATHY, WITH LONG-TERM CURRENT USE OF INSULIN: Chronic | ICD-10-CM

## 2024-07-09 LAB — HBA1C MFR BLD: 8.2 % (ref 4.8–5.6)

## 2024-07-09 PROCEDURE — 83036 HEMOGLOBIN GLYCOSYLATED A1C: CPT | Performed by: INTERNAL MEDICINE

## 2024-07-09 PROCEDURE — 36415 COLL VENOUS BLD VENIPUNCTURE: CPT

## 2024-07-09 PROCEDURE — 3046F HEMOGLOBIN A1C LEVEL >9.0%: CPT | Performed by: INTERNAL MEDICINE

## 2024-07-09 PROCEDURE — 99214 OFFICE O/P EST MOD 30 MIN: CPT | Performed by: INTERNAL MEDICINE

## 2024-07-09 PROCEDURE — 1125F AMNT PAIN NOTED PAIN PRSNT: CPT | Performed by: INTERNAL MEDICINE

## 2024-07-09 RX ORDER — INSULIN LISPRO 100 [IU]/ML
7 INJECTION, SOLUTION INTRAVENOUS; SUBCUTANEOUS
Qty: 15 ML | Refills: 2 | Status: SHIPPED | OUTPATIENT
Start: 2024-07-09 | End: 2024-07-10 | Stop reason: SDUPTHER

## 2024-07-09 RX ORDER — FUROSEMIDE 40 MG/1
40 TABLET ORAL DAILY
Qty: 30 TABLET | Refills: 2 | Status: SHIPPED | OUTPATIENT
Start: 2024-07-09 | End: 2024-07-13 | Stop reason: SDUPTHER

## 2024-07-09 RX ORDER — FUROSEMIDE 40 MG/1
40 TABLET ORAL DAILY
Qty: 30 TABLET | Refills: 2 | Status: CANCELLED | OUTPATIENT
Start: 2024-07-09

## 2024-07-09 NOTE — PROGRESS NOTES
Chief Complaint  Follow-up    HPI:    Nieves Mc presents to [facility]    Patient is a 48-year-old female presenting for follow-up.    Patient recently has been having issues with Zahiar as a couple of the refill reportedly did not have needles attached. She also reports that she had issues with reader and lost a lot of her data. Her blood sugars recently have been high around the 200's. Lowest have been 70's. She reportedly did not take her insulin for a couple days. Currently on 7 units before meals and 25 long acting at night.  Patient also currently on Farxiga.  Denies episodes of hypoglycemia.    Patient reportedly needing some refills of her prescriptions.  They reportedly gone to [facility] instead of [facility].  She is out of Xanax and is being refilled by Dr Lerma.     Review of Systems:  ROS negative unless otherwise noted in HPI above.    Past Medical History:   Diagnosis Date    Acute respiratory failure with hypoxia 07/28/2022    Adverse effect of chemotherapy 11/08/2023    Bilateral subdural hematomas 05/18/2023    Bone pain     Chronic constipation 07/07/2023    CML (chronic myelocytic leukemia) 04/01/2018    COVID-19 virus infection 01/12/2022    Diabetes mellitus     Diabetic gastroparesis 07/07/2023    Extremity pain     Carlin. legs pain    Fall during current hospitalization 06/25/2023    Leg pain     left leg greater    Medically noncompliant 03/11/2021    Migraine     Petechial rash 11/08/2023    Pubic ramus fracture, left, closed, initial encounter 06/25/2023    Pulmonary embolism     Traumatic subdural hemorrhage without loss of consciousness 05/17/2023    Tumor lysis syndrome 07/05/2022    UTI (urinary tract infection) 07/06/2023    Vision loss     doing surgery         Current Outpatient Medications:     acetaminophen (TYLENOL) 325 MG tablet, Take 2 tablets by mouth Every 4 (Four) Hours As Needed for Moderate Pain. Indications: Fever, Pain, Disp: 60 tablet, Rfl: 3     ALPRAZolam (Xanax) 0.5 MG tablet, Take 1 tablet by mouth At Night As Needed for Anxiety. Indications: Feeling Anxious, Disp: 30 tablet, Rfl: 0    Continuous Glucose Sensor (FreeStyle Zahira 2 Sensor) misc, Use 1 each 4 (Four) Times a Day Before Meals & at Bedtime. Dx code: E11.65, Disp: 2 each, Rfl: 2    dapagliflozin Propanediol (Farxiga) 10 MG tablet, Take 10 mg (1 tablet) by mouth Daily. Dx code: E11.65, Disp: 30 tablet, Rfl: 2    Diclofenac Sodium (VOLTAREN) 1 % gel gel, Apply 4 g topically to the appropriate area as directed 4 (Four) Times a Day As Needed (hip pain)., Disp: 100 g, Rfl: 2    furosemide (LASIX) 40 MG tablet, Take 1 tablet by mouth Daily. Indications: Edema, Disp: 30 tablet, Rfl: 2    gabapentin (Neurontin) 800 MG tablet, Take 1 tablet by mouth 3 (Three) Times a Day. Ordered through [facility]  Indications: Diabetes with Nerve Disease, Disp: 90 tablet, Rfl: 2    Insulin Glargine (LANTUS SOLOSTAR) 100 UNIT/ML injection pen, Inject 25 Units under the skin into the appropriate area as directed Every Night. Dx code: E11.65, Disp: 15 mL, Rfl: 2    Insulin Lispro, 1 Unit Dial, (HumaLOG KwikPen) 100 UNIT/ML solution pen-injector, Inject 7 Units under the skin into the appropriate area as directed 3 (Three) Times a Day Before Meals. Dx code: E11.65, Disp: 15 mL, Rfl: 2    metoclopramide (Reglan) 10 MG tablet, Take 1 tablet by mouth 4 (Four) Times a Day. Indications: Hiccups that are Hard to Cure, Nausea and Vomiting, Disp: 120 tablet, Rfl: 3    mirtazapine (REMERON) 30 MG tablet, Take 1 tablet by mouth every night at bedtime. Indications: Major Depressive Disorder, Disp: 30 tablet, Rfl: 2    pain patient supplied pump, by Intrathecal route Continuous., Disp: , Rfl:     PONATinib HCl (Iclusig) 10 MG tablet, Take 10 mg by mouth Every Other Day. Take with or without food.  Swallow whole, do not crush or chew., Disp: 30 tablet, Rfl: 2    rivaroxaban (Xarelto) 10 MG tablet, Take 1 tablet by mouth Daily.,  "Disp: 30 tablet, Rfl: 6    tiZANidine (ZANAFLEX) 4 MG tablet, Take 1 tablet by mouth Daily. Indications: Musculoskeletal Pain, Disp: 90 tablet, Rfl: 1    cetirizine (zyrTEC) 10 MG tablet, Take 1 tablet by mouth Daily. (Patient not taking: Reported on 7/9/2024), Disp: 30 tablet, Rfl: 0    losartan (COZAAR) 25 MG tablet, Take 1 tablet by mouth Daily for 30 days., Disp: 30 tablet, Rfl: 0    metoprolol succinate XL (TOPROL-XL) 25 MG 24 hr tablet, Take 1 tablet by mouth Every 12 (Twelve) Hours for 30 days., Disp: 60 tablet, Rfl: 0  No current facility-administered medications for this visit.    Facility-Administered Medications Ordered in Other Visits:     acetaminophen (TYLENOL) tablet 650 mg, 650 mg, Oral, Once, Denisha Gill APRN    Social History     Socioeconomic History    Marital status: Legally    Tobacco Use    Smoking status: Never    Smokeless tobacco: Never   Vaping Use    Vaping status: Never Used   Substance and Sexual Activity    Alcohol use: No    Drug use: No    Sexual activity: Defer        Objective   Vital Signs:  /78   Pulse 107   Resp 16   Ht 160 cm (63\")   SpO2 96%   BMI 29.23 kg/m²   Estimated body mass index is 29.23 kg/m² as calculated from the following:    Height as of this encounter: 160 cm (63\").    Weight as of 3/29/24: 74.8 kg (165 lb).    Physical Exam:  General: Well-appearing patient, no apparent distress  Cardiac: Regular rate and rhythm, normal S1/S2, no murmur, rubs or gallops, no lower extremity edema  Lungs: Clear to auscultation bilaterally, no crackles or wheezes  Abdomen: Soft, non-tender, no guarding or rebound tenderness, no hepatosplenomegaly  Skin: No significant rashes or lesions  MSK: Grossly normal tone and strength, fluid-filled sac approximately the size of a golf ball on the left elbow  Neuro: Alert and oriented x3, CN II-XII grossly intact  Psych: Appropriate mood and affect    Assessment and Plan:    (E11.42,  Z79.4) Type 2 diabetes " mellitus with diabetic polyneuropathy, with long-term current use of insulin -   Assessment: Blood sugars recently have been persistently elevated with blood glucoses in mid 200s to high 200s.  Due for repeat hemoglobin A1c.  Increasing long-acting insulin as blood sugars consistently persistently elevated.  Will make additional adjustments considering A1c in future blood glucose readings. Discussed importance of healthy diet and exercise to improve blood glucose.  Plan:  - Increase lantus to 28 units nightly  - Continue lispro 7 units with meals and Farxiga as prescribed  - Continue to monitor home blood glucoses  - Hemoglobin A1c and urine microalbumin  - Encourage patient to continue/Increase dietary efforts and physical activity.  - Annual eye exam  - Routine self foot exams    (F41.8) Depression with anxiety  Assessment: Mood overall stable on current medications including alprazolam and mirtazapine.  Patient currently prescribed alprazolam through hematology oncology due to mood being affected by underlying cancer diagnosis.  Discussed with patient that would recommend controlled substances before prescribed by one provider and hematology in this case as previously started by hematologist for cancer diagnoses.  Plan:     (M70.22) Olecranon bursitis of left elbow  Assessment: Most likely secondary to resting elbow on wheelchair.  No current evidence of infection that requires drainage or antibiotics.  Discussed conservative treatments, expected course, as well as signs and symptoms which should prompt reevaluation.  Plan:  - Avoid resting elbow on solid surfaces  - Tylenol as needed  - Ice as needed  - Consider Ortho referral if new/worsening symptoms    Patient was given instructions and counseling regarding her condition or for health maintenance advice. Please see specific information pulled into the AVS if appropriate.       Dr Pankaj Stover   Internal Medicine Physician  [facility]--[facility]itha Cervantes  [address], [address]  [place] [address]

## 2024-07-09 NOTE — PATIENT INSTRUCTIONS
Please stop at lab on second floor to have blood drawn    Medications:  - Increase lantus to 28 units nightly  - Continue lispro 7 units with meals  - Continue additional medications as prescribed  - Check with [facility] and see if medications can be pushed to [facility]    Please let me know what blood glucoses are in 1-2 weeks    Encourage healthy diet and activity as tolerated    Follow up with specialists as scheduled    Follow up in 3 months for recheck

## 2024-07-10 DIAGNOSIS — E11.42 TYPE 2 DIABETES MELLITUS WITH DIABETIC POLYNEUROPATHY, WITH LONG-TERM CURRENT USE OF INSULIN: Chronic | ICD-10-CM

## 2024-07-10 DIAGNOSIS — Z79.4 TYPE 2 DIABETES MELLITUS WITH DIABETIC POLYNEUROPATHY, WITH LONG-TERM CURRENT USE OF INSULIN: Chronic | ICD-10-CM

## 2024-07-10 RX ORDER — ALPRAZOLAM 0.5 MG/1
0.5 TABLET ORAL NIGHTLY PRN
Qty: 30 TABLET | Refills: 0 | Status: SHIPPED | OUTPATIENT
Start: 2024-07-10

## 2024-07-11 RX ORDER — INSULIN LISPRO 100 [IU]/ML
7 INJECTION, SOLUTION INTRAVENOUS; SUBCUTANEOUS
Qty: 15 ML | Refills: 2 | Status: SHIPPED | OUTPATIENT
Start: 2024-07-11 | End: 2024-07-13 | Stop reason: SDUPTHER

## 2024-07-13 DIAGNOSIS — E11.42 TYPE 2 DIABETES MELLITUS WITH DIABETIC POLYNEUROPATHY, WITH LONG-TERM CURRENT USE OF INSULIN: Chronic | ICD-10-CM

## 2024-07-13 DIAGNOSIS — Z79.4 TYPE 2 DIABETES MELLITUS WITH DIABETIC POLYNEUROPATHY, WITH LONG-TERM CURRENT USE OF INSULIN: Chronic | ICD-10-CM

## 2024-07-15 ENCOUNTER — PATIENT MESSAGE (OUTPATIENT)
Dept: FAMILY MEDICINE CLINIC | Facility: CLINIC | Age: 49
End: 2024-07-15
Payer: MEDICARE

## 2024-07-15 RX ORDER — INSULIN LISPRO 100 [IU]/ML
7 INJECTION, SOLUTION INTRAVENOUS; SUBCUTANEOUS
Qty: 15 ML | Refills: 2 | Status: SHIPPED | OUTPATIENT
Start: 2024-07-15

## 2024-07-15 RX ORDER — FUROSEMIDE 40 MG/1
40 TABLET ORAL DAILY
Qty: 30 TABLET | Refills: 2 | Status: SHIPPED | OUTPATIENT
Start: 2024-07-15

## 2024-07-15 NOTE — TELEPHONE ENCOUNTER
I spoke with the pharmacy and they were able to get the prescription straightened out. I called patient back to let her know it was fixed.

## 2024-07-18 ENCOUNTER — LAB (OUTPATIENT)
Dept: LAB | Facility: HOSPITAL | Age: 49
End: 2024-07-18
Payer: MEDICARE

## 2024-07-18 ENCOUNTER — OFFICE VISIT (OUTPATIENT)
Dept: ONCOLOGY | Facility: CLINIC | Age: 49
End: 2024-07-18
Payer: MEDICARE

## 2024-07-18 VITALS
TEMPERATURE: 98.2 F | RESPIRATION RATE: 18 BRPM | OXYGEN SATURATION: 91 % | SYSTOLIC BLOOD PRESSURE: 127 MMHG | HEIGHT: 63 IN | HEART RATE: 102 BPM | DIASTOLIC BLOOD PRESSURE: 84 MMHG | BODY MASS INDEX: 29.23 KG/M2

## 2024-07-18 DIAGNOSIS — C92.10 CML (CHRONIC MYELOCYTIC LEUKEMIA): Primary | ICD-10-CM

## 2024-07-18 LAB
ALBUMIN SERPL-MCNC: 4 G/DL (ref 3.5–5.2)
ALBUMIN/GLOB SERPL: 1.1 G/DL
ALP SERPL-CCNC: 345 U/L (ref 39–117)
ALT SERPL W P-5'-P-CCNC: 43 U/L (ref 1–33)
ANION GAP SERPL CALCULATED.3IONS-SCNC: 11.5 MMOL/L (ref 5–15)
AST SERPL-CCNC: 41 U/L (ref 1–32)
BASOPHILS # BLD AUTO: 0.05 10*3/MM3 (ref 0–0.2)
BASOPHILS NFR BLD AUTO: 2 % (ref 0–1.5)
BILIRUB SERPL-MCNC: 0.8 MG/DL (ref 0–1.2)
BUN SERPL-MCNC: 22 MG/DL (ref 6–20)
BUN/CREAT SERPL: 25.6 (ref 7–25)
CALCIUM SPEC-SCNC: 9.3 MG/DL (ref 8.6–10.5)
CHLORIDE SERPL-SCNC: 103 MMOL/L (ref 98–107)
CO2 SERPL-SCNC: 25.5 MMOL/L (ref 22–29)
CREAT SERPL-MCNC: 0.86 MG/DL (ref 0.57–1)
DEPRECATED RDW RBC AUTO: 55.6 FL (ref 37–54)
EGFRCR SERPLBLD CKD-EPI 2021: 83.5 ML/MIN/1.73
EOSINOPHIL # BLD AUTO: 0.05 10*3/MM3 (ref 0–0.4)
EOSINOPHIL NFR BLD AUTO: 2 % (ref 0.3–6.2)
ERYTHROCYTE [DISTWIDTH] IN BLOOD BY AUTOMATED COUNT: 17.2 % (ref 12.3–15.4)
GLOBULIN UR ELPH-MCNC: 3.5 GM/DL
GLUCOSE SERPL-MCNC: 242 MG/DL (ref 65–99)
HCT VFR BLD AUTO: 32 % (ref 34–46.6)
HGB BLD-MCNC: 9.6 G/DL (ref 12–15.9)
HOLD SPECIMEN: NORMAL
HOLD SPECIMEN: NORMAL
LYMPHOCYTES # BLD AUTO: 1.08 10*3/MM3 (ref 0.7–3.1)
LYMPHOCYTES NFR BLD AUTO: 42.7 % (ref 19.6–45.3)
MCH RBC QN AUTO: 27.6 PG (ref 26.6–33)
MCHC RBC AUTO-ENTMCNC: 30 G/DL (ref 31.5–35.7)
MCV RBC AUTO: 92 FL (ref 79–97)
MONOCYTES # BLD AUTO: 0.11 10*3/MM3 (ref 0.1–0.9)
MONOCYTES NFR BLD AUTO: 4.3 % (ref 5–12)
NEUTROPHILS NFR BLD AUTO: 1.24 10*3/MM3 (ref 1.7–7)
NEUTROPHILS NFR BLD AUTO: 49 % (ref 42.7–76)
PLATELET # BLD AUTO: 856 10*3/MM3 (ref 140–450)
PMV BLD AUTO: 9.7 FL (ref 6–12)
POTASSIUM SERPL-SCNC: 4.7 MMOL/L (ref 3.5–5.2)
PROT SERPL-MCNC: 7.5 G/DL (ref 6–8.5)
RBC # BLD AUTO: 3.48 10*6/MM3 (ref 3.77–5.28)
SODIUM SERPL-SCNC: 140 MMOL/L (ref 136–145)
WBC NRBC COR # BLD AUTO: 2.53 10*3/MM3 (ref 3.4–10.8)

## 2024-07-18 PROCEDURE — 80053 COMPREHEN METABOLIC PANEL: CPT | Performed by: INTERNAL MEDICINE

## 2024-07-18 PROCEDURE — 85025 COMPLETE CBC W/AUTO DIFF WBC: CPT

## 2024-07-18 PROCEDURE — 36415 COLL VENOUS BLD VENIPUNCTURE: CPT

## 2024-07-18 NOTE — PROGRESS NOTES
Hematology/Oncology Outpatient Follow Up    PATIENT NAME:Nieves Mc  :1975  MRN: [id number]  PRIMARY CARE PHYSICIAN: Pankaj Stover MD  REFERRING PHYSICIAN: Pankaj Stover MD    Chief Complaint   Patient presents with    Follow-up     CML (chronic myelocytic leukemia)            HISTORY OF PRESENT ILLNESS:         Patient is a 48 y.o. female with PMH significant for CML on treatment with Imatinib.  Patient stated that she typically feels poorly with Imatinib with frequent nausea and vomiting.  Also complained of weakness and fatigue.  Patient presented to ED on 10/22/18 with complaints of an elevated blood sugar around 400.  Stated she felt poorly and has had a productive cough with yellow sputum.  Complained of nausea and stated she was unable to keep any food down anytime she ate.  The patient reported subjective fever without chills.  She stated she had been coughing so hard that she was vomiting.  She denied hematemesis.  Denied diarrhea, constipation or blood in her stool.       Patient’s chest x-ray showed no evidence of acute pulmonary embolus.  The patient has ground-glass opacities throughout the lungs.  There is some air trapping noted which would appear to be   infectious.  Patient was started on antibiotics.      Hem/Onc was consulted on 10/23/18 for co-management of patient with CML.  Patient was seen by Dr. Lerma on 10/12 on previous admission for patient reported CML on Imatinib (Gleevec).  Patient reports she is under the care of Dr. Roach at [facility] in [place].  Patient was seen by Dr. Lerma in May 2018 when patient presented with hematuria, which was attributed to her Imatinib.  Dr. eLrma followed during hospitalization but then patient returned to Dr. Roach for her usual follow up.  She was again seen on 10/12.  Patient is stating she will switch to Dr. Lerma.    Review of Dr. Roach’s note:  On 18 patient had BCR-abl which was 61.326.  Patient had  been on Imatinib 400 mg daily.  She had presented in March 2018 with WBC of 24, hemoglobin 13.1, platelet count of 1,067,000.  Patient apparently had follow up BCR-abl in August 2018, results are not available for review.  Her bone marrow aspiration and biopsy was also performed at that time is currently not available for review.    11/6/18 - .  Uric acid 6.5.  BCR-abl by RT-PCR was measured at 3.36%.    Due to thrombocytosis, patient was placed on Hydroxyurea 500 mg twice a day on 12/11/18.  Platelet count juana to 900,000.  Patient was noncompliant with CBCs that were recommended.    Patient was asked to return to the office after not being able to reach her.   12/27/18 - Bone marrow aspiration and biopsy was consistent with chronic myeloid leukemia.  BCR-abl positive, chronic phase.  ABL kinase mutational analysis is currently pending on the bone marrow.    1/3/19 - Repeat CBC, WBC 17, hemoglobin 11.9, platelet count 1,072,000.  Hydroxyurea was increased to 1 gm twice a day with follow up CBC.    1/6/19 - Follow up CBC showed progressive increase in platelet to 1.1 million.  Patient was offered admission to the hospital, which she declined.  Hydroxyurea was increased to 1 gm three   times a day as of 1/6/19.   1/7/19 - EKG shows sinus tachycardia.   milliseconds.    ABL kinase on peripheral blood was ordered on 1/11/19.  Based on sequence analysis, there was no mutation detected.    2/12/19 - Patient was initiated on Tasigna 300 mg twice a day.  2/13/19 - Urinalysis was negative for hematuria.   2/22/19 -  milliseconds.  3/13/19 - EKG with QTC is 413 milliseconds.  Nonspecific changes noted.    4/18/19 - EKG showed QTC prolonged to 453 milliseconds.  This is changed from prior.  4/18/19 - Free T4 normal at 0.91.  Magnesium was 1.7.  BUN is 6.  Creatinine 0.7.  Bilirubin 2.2.  Phosphorous normal 3.7.  TSH 0.43, normal.  Free T3 was normal at 3.47.  Ionized calcium   normal at 1.23.  BCR-abl was  0.522%.    5/7/19 - EKG showed QTC of 441 milliseconds.  QT was 366.     Patient has been lost to follow-up since 2019 for CML.  Patient states that she lost her insurance.  She also does not have any primary care physician at this time.  She is diabetic on insulin.  Patient was recently admitted to the hospital for pneumonia due to COVID-19 infection.  At that time patient made a decision to become compliant with treatment.  She is currently on to Cigna 300 mg p.o. twice a day.  She is also on hydroxyurea 1 g twice a day.  Overall she feels better, she has more energy.  3/1/2022 white count is 53.92, hemoglobin 11.7 and platelets are 472    6/1/2022: Patient has not been seen since March 2022.  Also verified that she has not been taking to Tasigna since February 2022.  She comes in today with cough for 1 and half months, shortness of breath, denies fevers.  6/30/2022 patient had 2D echocardiogram which showed an EF of 41 to 45%.  Left ventricular cavity is mildly dilated.  She was diagnosed with nonischemic cardiomyopathy    11/18/2022: Patient was transferred from [facility] to [facility] due to cardiac failure.  She was then seen by NP [facility] hematology department.  Patient underwent tumor aspiration and biopsy at that time did not show chronic myeloid leukemia in accelerated phase markedly hypercellular marrow with megakaryocytic and myeloid hyperplasia with atypia and increased basophils blasts are not increased less than 1% moderate reticulin fibrosis.  According to the patient hydroxyurea was discontinued she was prescribed Gleevec 600 mg daily but she has only been taking 400 mg as she cannot find other milligram tablets.  She is already been pump inserted into her pelvic area.  She continues to experience nausea which resulted in her not taking the baclofen she should.  1/12/2023: WBC 17.64, hemoglobin 10.2, MCV 88.8, platelets 458,000.  On Gleevec 600 mg daily and hydroxyurea  500 mg twice daily.  2023: WBC 10.67, hemoglobin 10.0, MCV 86.4, platelets 370,000.  Patient on Gleevec 600 mg daily and hydroxyurea 500 mg once a day.  Patient instructed at the visit to discontinue her hydroxyurea.  2023: WBC 17.75, hemoglobin 10.4, MCV 87.2, platelets 425,000.  On Gleevec 600 mg daily.  3/10/2023: 2D echocardiogram showing EF of 44% EKG showing sinus tachycardia QTc of 491  10/5/2023: Patient was initiated on Dasatinib 100 mg p.o. daily  2023-2023: Patient hospitalized at [facility] due to rash that was thought to be secondary to Sprycel and elevated glucose.  Sprycel was held and patient was treated with a 5-day course of prednisone.  2023: WBC 3.10, hemoglobin 8.2, MCV 90.3, platelets 214,000, ANC 1100.    Past Medical History:   Diagnosis Date    Acute respiratory failure with hypoxia 2022    Adverse effect of chemotherapy 2023    Bilateral subdural hematomas 2023    Bone pain     Chronic constipation 2023    CML (chronic myelocytic leukemia) 2018    COVID-19 virus infection 2022    Diabetes mellitus     Diabetic gastroparesis 2023    Extremity pain     Carlin. legs pain    Fall during current hospitalization 2023    Leg pain     left leg greater    Medically noncompliant 2021    Migraine     Petechial rash 2023    Pubic ramus fracture, left, closed, initial encounter 2023    Pulmonary embolism     Traumatic subdural hemorrhage without loss of consciousness 2023    Tumor lysis syndrome 2022    UTI (urinary tract infection) 2023    Vision loss     doing surgery       Past Surgical History:   Procedure Laterality Date    BONE MARROW BIOPSY      BREAST SURGERY      BRONCHOSCOPY N/A 2022    Procedure: BRONCHOSCOPY bilateral lung washing;  Surgeon: Charlene Camara MD;  Location: [facility] ENDOSCOPY;  Service: Pulmonary;  Laterality: N/A;  post: rule out infection vs transfusion lung injury      SECTION      CHOLECYSTECTOMY      COLONOSCOPY N/A 6/29/2023    Procedure: COLONOSCOPY;  Surgeon: Freddy Villeda MD;  Location: [facility] ENDOSCOPY;  Service: Gastroenterology;  Laterality: N/A;  poor prep    ENDOSCOPY N/A 6/29/2023    Procedure: ESOPHAGOGASTRODUODENOSCOPY with biopsy x2 areas;  Surgeon: Freddy Villeda MD;  Location: [facility] ENDOSCOPY;  Service: Gastroenterology;  Laterality: N/A;  food in stomach;abnormal duodenal mucosa    EYE SURGERY      laser surgery due  to hemmorage--- 5/13/2021-- another surgery  lt eye11/15/21    RETINAL DETACHMENT SURGERY      SPINE SURGERY      Lombardi spinal block    TUBAL ABDOMINAL LIGATION           Current Outpatient Medications:     acetaminophen (TYLENOL) 325 MG tablet, Take 2 tablets by mouth Every 4 (Four) Hours As Needed for Moderate Pain. Indications: Fever, Pain, Disp: 60 tablet, Rfl: 3    ALPRAZolam (Xanax) 0.5 MG tablet, Take 1 tablet by mouth At Night As Needed for Anxiety. Indications: Feeling Anxious, Disp: 30 tablet, Rfl: 0    Continuous Glucose Sensor (FreeStyle Zahira 2 Sensor) misc, Use 1 each 4 (Four) Times a Day Before Meals & at Bedtime. Dx code: E11.65, Disp: 2 each, Rfl: 2    dapagliflozin Propanediol (Farxiga) 10 MG tablet, Take 10 mg (1 tablet) by mouth Daily. Dx code: E11.65, Disp: 30 tablet, Rfl: 2    Diclofenac Sodium (VOLTAREN) 1 % gel gel, Apply 4 g topically to the appropriate area as directed 4 (Four) Times a Day As Needed (hip pain)., Disp: 100 g, Rfl: 2    furosemide (LASIX) 40 MG tablet, Take 1 tablet by mouth Daily. Indications: Edema, Disp: 30 tablet, Rfl: 2    gabapentin (Neurontin) 800 MG tablet, Take 1 tablet by mouth 3 (Three) Times a Day. Ordered through [facility]  Indications: Diabetes with Nerve Disease, Disp: 90 tablet, Rfl: 2    Insulin Glargine (LANTUS SOLOSTAR) 100 UNIT/ML injection pen, Inject 28 Units under the skin into the appropriate area as directed Every Night. Indications: Type 2 Diabetes, Disp: 15 mL, Rfl: 2     Insulin Lispro, 1 Unit Dial, (HumaLOG KwikPen) 100 UNIT/ML solution pen-injector, Inject 7 Units under the skin into the appropriate area as directed 3 (Three) Times a Day Before Meals. Dx code: E11.65, Disp: 15 mL, Rfl: 2    losartan (COZAAR) 25 MG tablet, Take 1 tablet by mouth Daily for 30 days., Disp: 30 tablet, Rfl: 0    metoclopramide (Reglan) 10 MG tablet, Take 1 tablet by mouth 4 (Four) Times a Day. Indications: Hiccups that are Hard to Cure, Nausea and Vomiting, Disp: 120 tablet, Rfl: 3    metoprolol succinate XL (TOPROL-XL) 25 MG 24 hr tablet, Take 1 tablet by mouth Every 12 (Twelve) Hours for 30 days., Disp: 60 tablet, Rfl: 0    mirtazapine (REMERON) 30 MG tablet, Take 1 tablet by mouth every night at bedtime. Indications: Major Depressive Disorder, Disp: 30 tablet, Rfl: 2    pain patient supplied pump, by Intrathecal route Continuous., Disp: , Rfl:     PONATinib HCl (Iclusig) 10 MG tablet, Take 10 mg by mouth Every Other Day. Take with or without food.  Swallow whole, do not crush or chew., Disp: 30 tablet, Rfl: 2    rivaroxaban (Xarelto) 10 MG tablet, Take 1 tablet by mouth Daily., Disp: 30 tablet, Rfl: 6    tiZANidine (ZANAFLEX) 4 MG tablet, Take 1 tablet by mouth Daily. Indications: Musculoskeletal Pain, Disp: 90 tablet, Rfl: 1  No current facility-administered medications for this visit.    Facility-Administered Medications Ordered in Other Visits:     acetaminophen (TYLENOL) tablet 650 mg, 650 mg, Oral, Once, Denisha Gill, JP    No Known Allergies    Family History   Problem Relation Age of Onset    Diabetes Mother     Diabetes Maternal Grandmother     Heart attack Maternal Grandmother     Stroke Maternal Grandmother        Cancer-related family history is not on file.    Social History     Tobacco Use    Smoking status: Never    Smokeless tobacco: Never   Vaping Use    Vaping status: Never Used   Substance Use Topics    Alcohol use: No    Drug use: No       I have reviewed and confirmed  "the accuracy of the patient's history: Chief complaint, HPI, ROS, and Subjective as entered by the MA/LPN/RN. Meghann Lerma MD 07/18/24      SUBJECTIVE:    Patient remains on ponatinib 10 mg every other day.  She denies any new issues    Patient has been noncompliant with ponatinib past 2 weeks      Hope NICHOLAS Mc reports a pain score of 8.  Given her pain assessment as noted, treatment options were discussed and the following options were decided upon as a follow-up plan to address the patient's pain: referral to Physical Therapy.     Prescription for Voltaren gel, continue pain management with specialist      .REVIEW OF SYSTEMS:    Review of Systems   Constitutional: Negative for chills and fever.   HENT: Negative for ear pain, mouth sores, nosebleeds and sore throat.    Eyes: Negative for photophobia and visual disturbance.   Respiratory: Negative for wheezing and stridor.    Cardiovascular: Negative for chest pain and palpitations.   Gastrointestinal: Negative for abdominal pain, diarrhea,.  She has nausea  Endocrine: Negative for cold intolerance and heat intolerance.   Genitourinary: Negative for dysuria and hematuria.   Musculoskeletal: Negative for joint swelling and neck stiffness.  Positive for back and leg pain.   Skin: Negative for color change and rash.   Neurological: Negative for seizures and syncope.   Hematological: Negative for adenopathy.        No obvious bleeding   Psychiatric/Behavioral: Negative for agitation, confusion and hallucinations. Positive for insomnia, anxiety, depression.      OBJECTIVE:    Vitals:    07/18/24 1357   BP: 127/84   Pulse: 102   Resp: 18   Temp: 98.2 °F (36.8 °C)   TempSrc: Infrared   SpO2: 91%   Height: 160 cm (63\")   PainSc:   8   PainLoc: Back               Body mass index is 29.23 kg/m².    ECOG  (1) Restricted in physically strenuous activity, ambulatory and able to do work of light nature    Physical Exam  Vitals and nursing note reviewed. "   Constitutional:       General: She is not in acute distress.     Appearance: She is not diaphoretic.   HENT:      Head: Normocephalic and atraumatic.   Eyes:      General: No scleral icterus.        Right eye: No discharge.         Left eye: No discharge.      Conjunctiva/sclera: Conjunctivae normal.   Neck:      Thyroid: No thyromegaly.   Cardiovascular:      Rate and Rhythm: Normal rate and regular rhythm.      Heart sounds: Normal heart sounds. No friction rub. No gallop.    Bilateral lower extremity edema, 2+  Pulmonary:      Effort: Pulmonary effort is normal. No respiratory distress.      Breath sounds: No stridor. No wheezing.   Abdominal:      General: Bowel sounds are normal.      Palpations: Abdomen is soft. There is no mass.      Tenderness: There is no abdominal tenderness. There is no guarding or rebound.   Musculoskeletal:         General: No tenderness. Normal range of motion.      Cervical back: Normal range of motion and neck supple.   Lymphadenopathy:      Cervical: No cervical adenopathy.   Skin:     General: Skin is warm.      Findings: No erythema or rash.   Neurological:      Mental Status: She is alert and oriented to person, place, and time.      Motor: No abnormal muscle tone.   Psychiatric:         Behavior: Behavior    I have reexamined the patient and the results are consistent with the previously documented exam. Meghann Lerma MD      RECENT LABS    WBC   Date Value Ref Range Status   07/18/2024 2.53 (L) 3.40 - 10.80 10*3/mm3 Final     RBC   Date Value Ref Range Status   07/18/2024 3.48 (L) 3.77 - 5.28 10*6/mm3 Final     Hemoglobin   Date Value Ref Range Status   07/18/2024 9.6 (L) 12.0 - 15.9 g/dL Final     Hematocrit   Date Value Ref Range Status   07/18/2024 32.0 (L) 34.0 - 46.6 % Final     MCV   Date Value Ref Range Status   07/18/2024 92.0 79.0 - 97.0 fL Final     MCH   Date Value Ref Range Status   07/18/2024 27.6 26.6 - 33.0 pg Final     MCHC   Date Value Ref Range  Status   07/18/2024 30.0 (L) 31.5 - 35.7 g/dL Final     RDW   Date Value Ref Range Status   07/18/2024 17.2 (H) 12.3 - 15.4 % Final     RDW-SD   Date Value Ref Range Status   07/18/2024 55.6 (H) 37.0 - 54.0 fl Final     MPV   Date Value Ref Range Status   07/18/2024 9.7 6.0 - 12.0 fL Final     Platelets   Date Value Ref Range Status   07/18/2024 856 (H) 140 - 450 10*3/mm3 Final     Neutrophil %   Date Value Ref Range Status   07/18/2024 49.0 42.7 - 76.0 % Final     Lymphocyte %   Date Value Ref Range Status   07/18/2024 42.7 19.6 - 45.3 % Final     Monocyte %   Date Value Ref Range Status   07/18/2024 4.3 (L) 5.0 - 12.0 % Final     Eosinophil %   Date Value Ref Range Status   07/18/2024 2.0 0.3 - 6.2 % Final     Basophil %   Date Value Ref Range Status   07/18/2024 2.0 (H) 0.0 - 1.5 % Final     External Neutrophils Abs   Date Value Ref Range Status   11/30/2022 7.6 (H) 1.7 - 6.0 x10(3)/ul Final     Neutrophils, Absolute   Date Value Ref Range Status   07/18/2024 1.24 (L) 1.70 - 7.00 10*3/mm3 Final     Lymphocytes, Absolute   Date Value Ref Range Status   07/18/2024 1.08 0.70 - 3.10 10*3/mm3 Final     Monocytes, Absolute   Date Value Ref Range Status   07/18/2024 0.11 0.10 - 0.90 10*3/mm3 Final     Eosinophils, Absolute   Date Value Ref Range Status   07/18/2024 0.05 0.00 - 0.40 10*3/mm3 Final     Basophils, Absolute   Date Value Ref Range Status   07/18/2024 0.05 0.00 - 0.20 10*3/mm3 Final     nRBC   Date Value Ref Range Status   05/08/2024 3.0 (H) 0.0 - 0.2 /100 WBC Final   12/04/2023 0.3 (H) 0.0 - 0.2 /100 WBC Final       Lab Results   Component Value Date    GLUCOSE 242 (H) 07/18/2024    BUN 22 (H) 07/18/2024    CREATININE 0.86 07/18/2024    EGFRIFNONA 133 02/02/2022    BCR 25.6 (H) 07/18/2024    K 4.7 07/18/2024    CO2 25.5 07/18/2024    CALCIUM 9.3 07/18/2024    ALBUMIN 4.0 07/18/2024    LABIL2 1.0 04/18/2019    AST 41 (H) 07/18/2024    ALT 43 (H) 07/18/2024           ASSESSMENT    Accelerated phase CML, status  post bone marrow biopsy on 11/3/2022.  Patient was on ponatinib but developed significant pancytopenia and therefore treatment has been held.  Patient was not able to tolerate ponatinib despite significantly low doses at 10 mg every other day.  She is not now willing to retry this.  Will therefore restart ponatinib 10 mg every other day.  Sinew ponatinib.  Discussed compliance  Discontinue Sprycel due to lack of tolerance  Thrombocytosis likely secondary to uncontrolled malignancy.  Discussed that this could be thrombogenic  Hypercalcemia likely secondary to malignancy: CMP today  Nausea vomiting secondary to Sprycel, likely she also has gastroparesis from longstanding uncontrolled diabetes: Will refer to GI for evaluation  Pain management issues, patient initially refusing to follow-up with her physician at [facility].  Patient has been seen by her pain physician.  She will continue follow-up with our pain physicians  Pancytopenia: Reviewed  BCR ABL kinase mutation assay was negative  Cardiomyopathy her most recent echo shows an EF of 44% which is an improvement from 26 to 30%.  Patient encouraged to follow-up with her cardiologist  Anxiety: Continue anxiolytics  CML in chronic phase with no significant hematologic or molecular or cytogenetic response on   Imatinib.  ABL kinase mutational analysis was negative.  We  have represcribed to Tasigna 300 mg twice a day.  Patient has been noncompliant with treatments and ended up in the hospital with hyperleukocytosis, peripheral blood blasts.  Bone marrow biopsy suggest that she remains in chronic phase.  Continue to Tasiigna at the current doses.  Hydroxyurea has been discontinued.  Uncontrolled diabetes type 1, followed at the [facility] by Dr. Calles  Chronic anemia: Stable, multifactorial from CML, to Tasigna, hydroxyurea.  This has improved  Insomnia  Situational anxiety, not suicidal.  Continue Xanax 0.5 mg once daily  History  of medical noncompliance  Pulmonary embolism: Xarelto restarted  Recent COVID-19 pneumonia  History of prolonged QTC        Plans    Continue ponatinib  She declines port placement  Bone scan reviewed and negative for any metastatic disease  Send encouraged to continue to get weekly labs  She was discharged from the hospital on Reglan 10 mg p.o. 4 times a day for nausea  Increase mirtazapine to 30 mg p.o. nightly for depression and insomnia  Home health physical therapy ordered for bilateral hip osteoarthritis, Voltaren gel added  Follow-up with Dr. Lerma in 2 weeks as previously scheduled  Request additional records from [facility]  DC trazodone 25 mg due to QT prolongation  Continue to follow-up with cardiology in regards to dosing of her diuretics.  EKG reviewed.  She has slight prolongation of QT.  We will discontinue Zofran and Phenergan and use Compazine as needed for nausea.  Prescription has been sent to her pharmacy  Refill Xanax  Continue to follow-up with PCP per routine  Follow-up with pain management per routine  Refilled Lasix per hospital discharge notes.  Has an appointment to establish with PCP on 5/9/2024.  Follow-up 4 weeks        I spent 30 total minutes, face-to-face, caring for Hope today. 90% of this time involved counseling and/or coordination of care as documented within this note.

## 2024-07-19 ENCOUNTER — SPECIALTY PHARMACY (OUTPATIENT)
Dept: PHARMACY | Facility: HOSPITAL | Age: 49
End: 2024-07-19
Payer: MEDICARE

## 2024-07-19 NOTE — PROGRESS NOTES
Specialty Pharmacy Note: Iclusig (ponatinib)    Nieves Mc is a 48 y.o. female with CML was seen 7/18/24 by Dr. Lerma. Per provider dictation, no changes to oral oncology regimen Iclusig (ponatinib).  Labs Review: The CMP and CBC from 7/18/24 have been reviewed. No dose adjustments are needed for the oral specialty medication(s) based on the labs. Patient has history of non compliance with treatment.     Specialty pharmacy will continue to follow patient.    Ros CALHOUN, PharmD    7/19/2024  16:05 EDT

## 2024-07-30 DIAGNOSIS — E11.42 TYPE 2 DIABETES MELLITUS WITH DIABETIC POLYNEUROPATHY, WITH LONG-TERM CURRENT USE OF INSULIN: Chronic | ICD-10-CM

## 2024-07-30 DIAGNOSIS — Z79.4 TYPE 2 DIABETES MELLITUS WITH DIABETIC POLYNEUROPATHY, WITH LONG-TERM CURRENT USE OF INSULIN: Chronic | ICD-10-CM

## 2024-07-31 RX ORDER — INSULIN LISPRO 100 [IU]/ML
7 INJECTION, SOLUTION INTRAVENOUS; SUBCUTANEOUS
Qty: 15 ML | Refills: 2 | Status: SHIPPED | OUTPATIENT
Start: 2024-07-31

## 2024-07-31 RX ORDER — FUROSEMIDE 40 MG/1
40 TABLET ORAL DAILY
Qty: 30 TABLET | Refills: 2 | Status: SHIPPED | OUTPATIENT
Start: 2024-07-31

## 2024-08-05 ENCOUNTER — SPECIALTY PHARMACY (OUTPATIENT)
Dept: PHARMACY | Facility: HOSPITAL | Age: 49
End: 2024-08-05
Payer: MEDICARE

## 2024-08-06 DIAGNOSIS — Z79.4 TYPE 2 DIABETES MELLITUS WITH DIABETIC POLYNEUROPATHY, WITH LONG-TERM CURRENT USE OF INSULIN: Chronic | ICD-10-CM

## 2024-08-06 DIAGNOSIS — E11.42 TYPE 2 DIABETES MELLITUS WITH DIABETIC POLYNEUROPATHY, WITH LONG-TERM CURRENT USE OF INSULIN: Chronic | ICD-10-CM

## 2024-08-07 DIAGNOSIS — C92.10 CML (CHRONIC MYELOCYTIC LEUKEMIA): ICD-10-CM

## 2024-08-07 DIAGNOSIS — F41.9 ANXIETY: ICD-10-CM

## 2024-08-07 RX ORDER — ALPRAZOLAM 0.5 MG/1
0.5 TABLET ORAL NIGHTLY PRN
Qty: 30 TABLET | Refills: 0 | Status: SHIPPED | OUTPATIENT
Start: 2024-08-07

## 2024-08-07 NOTE — TELEPHONE ENCOUNTER
Caller: Nieves Mc    Relationship: Self    Best call back number: [phone]    Requested Prescriptions:   Requested Prescriptions     Pending Prescriptions Disp Refills    ALPRAZolam (Xanax) 0.5 MG tablet 30 tablet 0     Sig: Take 1 tablet by mouth At Night As Needed for Anxiety. Indications: Feeling Anxious        Pharmacy where request should be sent: [facility]/PHARMACY #[id number] - [address] [phone] [facility] [phone]      Last office visit with prescribing clinician: 7/18/2024   Last telemedicine visit with prescribing clinician: Visit date not found   Next office visit with prescribing clinician: 8/15/2024     Additional details provided by patient:     Does the patient have less than a 3 day supply:  [x] Yes  [] No    Would you like a call back once the refill request has been completed: [] Yes [x] No    If the office needs to give you a call back, can they leave a voicemail: [] Yes [x] No  
1

## 2024-08-15 ENCOUNTER — SPECIALTY PHARMACY (OUTPATIENT)
Dept: PHARMACY | Facility: HOSPITAL | Age: 49
End: 2024-08-15
Payer: MEDICARE

## 2024-08-15 ENCOUNTER — OFFICE VISIT (OUTPATIENT)
Dept: ONCOLOGY | Facility: CLINIC | Age: 49
End: 2024-08-15
Payer: MEDICARE

## 2024-08-15 ENCOUNTER — LAB (OUTPATIENT)
Dept: LAB | Facility: HOSPITAL | Age: 49
End: 2024-08-15
Payer: MEDICARE

## 2024-08-15 VITALS
HEIGHT: 63 IN | TEMPERATURE: 98 F | RESPIRATION RATE: 18 BRPM | OXYGEN SATURATION: 92 % | BODY MASS INDEX: 29.23 KG/M2 | DIASTOLIC BLOOD PRESSURE: 94 MMHG | SYSTOLIC BLOOD PRESSURE: 166 MMHG | HEART RATE: 103 BPM

## 2024-08-15 DIAGNOSIS — C92.10 CML (CHRONIC MYELOCYTIC LEUKEMIA): Primary | ICD-10-CM

## 2024-08-15 LAB
ALBUMIN SERPL-MCNC: 4.2 G/DL (ref 3.5–5.2)
ALBUMIN/GLOB SERPL: 1.3 G/DL
ALP SERPL-CCNC: 288 U/L (ref 39–117)
ALT SERPL W P-5'-P-CCNC: 57 U/L (ref 1–33)
ANION GAP SERPL CALCULATED.3IONS-SCNC: 10.6 MMOL/L (ref 5–15)
AST SERPL-CCNC: 43 U/L (ref 1–32)
BASOPHILS # BLD AUTO: 0.03 10*3/MM3 (ref 0–0.2)
BASOPHILS NFR BLD AUTO: 0.6 % (ref 0–1.5)
BILIRUB SERPL-MCNC: 0.8 MG/DL (ref 0–1.2)
BUN SERPL-MCNC: 24 MG/DL (ref 6–20)
BUN/CREAT SERPL: 28.2 (ref 7–25)
CALCIUM SPEC-SCNC: 9.1 MG/DL (ref 8.6–10.5)
CHLORIDE SERPL-SCNC: 104 MMOL/L (ref 98–107)
CO2 SERPL-SCNC: 24.4 MMOL/L (ref 22–29)
CREAT SERPL-MCNC: 0.85 MG/DL (ref 0.57–1)
DEPRECATED RDW RBC AUTO: 62.4 FL (ref 37–54)
EGFRCR SERPLBLD CKD-EPI 2021: 84.6 ML/MIN/1.73
EOSINOPHIL # BLD AUTO: 0.01 10*3/MM3 (ref 0–0.4)
EOSINOPHIL NFR BLD AUTO: 0.2 % (ref 0.3–6.2)
ERYTHROCYTE [DISTWIDTH] IN BLOOD BY AUTOMATED COUNT: 19.6 % (ref 12.3–15.4)
GLOBULIN UR ELPH-MCNC: 3.2 GM/DL
GLUCOSE SERPL-MCNC: 262 MG/DL (ref 65–99)
HCT VFR BLD AUTO: 34.3 % (ref 34–46.6)
HGB BLD-MCNC: 10.4 G/DL (ref 12–15.9)
HOLD SPECIMEN: NORMAL
HOLD SPECIMEN: NORMAL
LYMPHOCYTES # BLD AUTO: 1.34 10*3/MM3 (ref 0.7–3.1)
LYMPHOCYTES NFR BLD AUTO: 27.7 % (ref 19.6–45.3)
MCH RBC QN AUTO: 27.8 PG (ref 26.6–33)
MCHC RBC AUTO-ENTMCNC: 30.3 G/DL (ref 31.5–35.7)
MCV RBC AUTO: 91.7 FL (ref 79–97)
MONOCYTES # BLD AUTO: 0.25 10*3/MM3 (ref 0.1–0.9)
MONOCYTES NFR BLD AUTO: 5.2 % (ref 5–12)
NEUTROPHILS NFR BLD AUTO: 3.21 10*3/MM3 (ref 1.7–7)
NEUTROPHILS NFR BLD AUTO: 66.3 % (ref 42.7–76)
PLATELET # BLD AUTO: 135 10*3/MM3 (ref 140–450)
PMV BLD AUTO: 12 FL (ref 6–12)
POTASSIUM SERPL-SCNC: 4.9 MMOL/L (ref 3.5–5.2)
PROT SERPL-MCNC: 7.4 G/DL (ref 6–8.5)
RBC # BLD AUTO: 3.74 10*6/MM3 (ref 3.77–5.28)
SODIUM SERPL-SCNC: 139 MMOL/L (ref 136–145)
WBC NRBC COR # BLD AUTO: 4.84 10*3/MM3 (ref 3.4–10.8)

## 2024-08-15 PROCEDURE — 80053 COMPREHEN METABOLIC PANEL: CPT | Performed by: INTERNAL MEDICINE

## 2024-08-15 PROCEDURE — 36415 COLL VENOUS BLD VENIPUNCTURE: CPT

## 2024-08-15 PROCEDURE — 85025 COMPLETE CBC W/AUTO DIFF WBC: CPT

## 2024-08-15 PROCEDURE — 1126F AMNT PAIN NOTED NONE PRSNT: CPT | Performed by: INTERNAL MEDICINE

## 2024-08-15 PROCEDURE — 99214 OFFICE O/P EST MOD 30 MIN: CPT | Performed by: INTERNAL MEDICINE

## 2024-08-15 RX ORDER — ONDANSETRON 4 MG/1
TABLET, ORALLY DISINTEGRATING ORAL
COMMUNITY
Start: 2024-07-27

## 2024-08-15 NOTE — PROGRESS NOTES
Hematology/Oncology Outpatient Follow Up    PATIENT NAME:Nieves Mc  :1975  MRN: [id number]  PRIMARY CARE PHYSICIAN: Pankaj Stover MD  REFERRING PHYSICIAN: Pankaj Stover MD    Chief Complaint   Patient presents with    Follow-up     CML (chronic myelocytic leukemia)            HISTORY OF PRESENT ILLNESS:         Patient is a 48 y.o. female with PMH significant for CML on treatment with Imatinib.  Patient stated that she typically feels poorly with Imatinib with frequent nausea and vomiting.  Also complained of weakness and fatigue.  Patient presented to ED on 10/22/18 with complaints of an elevated blood sugar around 400.  Stated she felt poorly and has had a productive cough with yellow sputum.  Complained of nausea and stated she was unable to keep any food down anytime she ate.  The patient reported subjective fever without chills.  She stated she had been coughing so hard that she was vomiting.  She denied hematemesis.  Denied diarrhea, constipation or blood in her stool.       Patient’s chest x-ray showed no evidence of acute pulmonary embolus.  The patient has ground-glass opacities throughout the lungs.  There is some air trapping noted which would appear to be   infectious.  Patient was started on antibiotics.      Hem/Onc was consulted on 10/23/18 for co-management of patient with CML.  Patient was seen by Dr. Lerma on 10/12 on previous admission for patient reported CML on Imatinib (Gleevec).  Patient reports she is under the care of Dr. Roach at [facility] in [place].  Patient was seen by Dr. Lerma in May 2018 when patient presented with hematuria, which was attributed to her Imatinib.  Dr. Lerma followed during hospitalization but then patient returned to Dr. Roach for her usual follow up.  She was again seen on 10/12.  Patient is stating she will switch to Dr. Lerma.    Review of Dr. Roach’s note:  On 18 patient had BCR-abl which was 61.326.  Patient had  been on Imatinib 400 mg daily.  She had presented in March 2018 with WBC of 24, hemoglobin 13.1, platelet count of 1,067,000.  Patient apparently had follow up BCR-abl in August 2018, results are not available for review.  Her bone marrow aspiration and biopsy was also performed at that time is currently not available for review.    11/6/18 - .  Uric acid 6.5.  BCR-abl by RT-PCR was measured at 3.36%.    Due to thrombocytosis, patient was placed on Hydroxyurea 500 mg twice a day on 12/11/18.  Platelet count juana to 900,000.  Patient was noncompliant with CBCs that were recommended.    Patient was asked to return to the office after not being able to reach her.   12/27/18 - Bone marrow aspiration and biopsy was consistent with chronic myeloid leukemia.  BCR-abl positive, chronic phase.  ABL kinase mutational analysis is currently pending on the bone marrow.    1/3/19 - Repeat CBC, WBC 17, hemoglobin 11.9, platelet count 1,072,000.  Hydroxyurea was increased to 1 gm twice a day with follow up CBC.    1/6/19 - Follow up CBC showed progressive increase in platelet to 1.1 million.  Patient was offered admission to the hospital, which she declined.  Hydroxyurea was increased to 1 gm three   times a day as of 1/6/19.   1/7/19 - EKG shows sinus tachycardia.   milliseconds.    ABL kinase on peripheral blood was ordered on 1/11/19.  Based on sequence analysis, there was no mutation detected.    2/12/19 - Patient was initiated on Tasigna 300 mg twice a day.  2/13/19 - Urinalysis was negative for hematuria.   2/22/19 -  milliseconds.  3/13/19 - EKG with QTC is 413 milliseconds.  Nonspecific changes noted.    4/18/19 - EKG showed QTC prolonged to 453 milliseconds.  This is changed from prior.  4/18/19 - Free T4 normal at 0.91.  Magnesium was 1.7.  BUN is 6.  Creatinine 0.7.  Bilirubin 2.2.  Phosphorous normal 3.7.  TSH 0.43, normal.  Free T3 was normal at 3.47.  Ionized calcium   normal at 1.23.  BCR-abl was  0.522%.    5/7/19 - EKG showed QTC of 441 milliseconds.  QT was 366.     Patient has been lost to follow-up since 2019 for CML.  Patient states that she lost her insurance.  She also does not have any primary care physician at this time.  She is diabetic on insulin.  Patient was recently admitted to the hospital for pneumonia due to COVID-19 infection.  At that time patient made a decision to become compliant with treatment.  She is currently on to Cigna 300 mg p.o. twice a day.  She is also on hydroxyurea 1 g twice a day.  Overall she feels better, she has more energy.  3/1/2022 white count is 53.92, hemoglobin 11.7 and platelets are 472    6/1/2022: Patient has not been seen since March 2022.  Also verified that she has not been taking to Tasigna since February 2022.  She comes in today with cough for 1 and half months, shortness of breath, denies fevers.  6/30/2022 patient had 2D echocardiogram which showed an EF of 41 to 45%.  Left ventricular cavity is mildly dilated.  She was diagnosed with nonischemic cardiomyopathy    11/18/2022: Patient was transferred from [facility] to [facility] due to cardiac failure.  She was then seen by NP [facility] hematology department.  Patient underwent tumor aspiration and biopsy at that time did not show chronic myeloid leukemia in accelerated phase markedly hypercellular marrow with megakaryocytic and myeloid hyperplasia with atypia and increased basophils blasts are not increased less than 1% moderate reticulin fibrosis.  According to the patient hydroxyurea was discontinued she was prescribed Gleevec 600 mg daily but she has only been taking 400 mg as she cannot find other milligram tablets.  She is already been pump inserted into her pelvic area.  She continues to experience nausea which resulted in her not taking the baclofen she should.  1/12/2023: WBC 17.64, hemoglobin 10.2, MCV 88.8, platelets 458,000.  On Gleevec 600 mg daily and hydroxyurea  500 mg twice daily.  2023: WBC 10.67, hemoglobin 10.0, MCV 86.4, platelets 370,000.  Patient on Gleevec 600 mg daily and hydroxyurea 500 mg once a day.  Patient instructed at the visit to discontinue her hydroxyurea.  2023: WBC 17.75, hemoglobin 10.4, MCV 87.2, platelets 425,000.  On Gleevec 600 mg daily.  3/10/2023: 2D echocardiogram showing EF of 44% EKG showing sinus tachycardia QTc of 491  10/5/2023: Patient was initiated on Dasatinib 100 mg p.o. daily  2023-2023: Patient hospitalized at [facility] due to rash that was thought to be secondary to Sprycel and elevated glucose.  Sprycel was held and patient was treated with a 5-day course of prednisone.  2023: WBC 3.10, hemoglobin 8.2, MCV 90.3, platelets 214,000, ANC 1100.    Past Medical History:   Diagnosis Date    Acute respiratory failure with hypoxia 2022    Adverse effect of chemotherapy 2023    Bilateral subdural hematomas 2023    Bone pain     Chronic constipation 2023    CML (chronic myelocytic leukemia) 2018    COVID-19 virus infection 2022    Diabetes mellitus     Diabetic gastroparesis 2023    Extremity pain     Carlin. legs pain    Fall during current hospitalization 2023    Leg pain     left leg greater    Medically noncompliant 2021    Migraine     Petechial rash 2023    Pubic ramus fracture, left, closed, initial encounter 2023    Pulmonary embolism     Traumatic subdural hemorrhage without loss of consciousness 2023    Tumor lysis syndrome 2022    UTI (urinary tract infection) 2023    Vision loss     doing surgery       Past Surgical History:   Procedure Laterality Date    BONE MARROW BIOPSY      BREAST SURGERY      BRONCHOSCOPY N/A 2022    Procedure: BRONCHOSCOPY bilateral lung washing;  Surgeon: Charlene Camara MD;  Location: [facility] ENDOSCOPY;  Service: Pulmonary;  Laterality: N/A;  post: rule out infection vs transfusion lung injury      SECTION      CHOLECYSTECTOMY      COLONOSCOPY N/A 6/29/2023    Procedure: COLONOSCOPY;  Surgeon: Freddy Villeda MD;  Location: [facility] ENDOSCOPY;  Service: Gastroenterology;  Laterality: N/A;  poor prep    ENDOSCOPY N/A 6/29/2023    Procedure: ESOPHAGOGASTRODUODENOSCOPY with biopsy x2 areas;  Surgeon: Freddy Villeda MD;  Location: [facility] ENDOSCOPY;  Service: Gastroenterology;  Laterality: N/A;  food in stomach;abnormal duodenal mucosa    EYE SURGERY      laser surgery due  to hemmorage--- 5/13/2021-- another surgery  lt eye11/15/21    RETINAL DETACHMENT SURGERY      SPINE SURGERY      Lombardi spinal block    TUBAL ABDOMINAL LIGATION           Current Outpatient Medications:     ondansetron ODT (ZOFRAN-ODT) 4 MG disintegrating tablet, PLACE 1 TABLET ON TOP OF THE TONGUE EVERY 8 HOURS AS NEEDED, Disp: , Rfl:     acetaminophen (TYLENOL) 325 MG tablet, Take 2 tablets by mouth Every 4 (Four) Hours As Needed for Moderate Pain. Indications: Fever, Pain, Disp: 60 tablet, Rfl: 3    ALPRAZolam (Xanax) 0.5 MG tablet, Take 1 tablet by mouth At Night As Needed for Anxiety. Indications: Feeling Anxious, Disp: 30 tablet, Rfl: 0    Continuous Glucose Sensor (FreeStyle Zahira 2 Sensor) misc, Use 1 each 4 (Four) Times a Day Before Meals & at Bedtime. Dx code: E11.65, Disp: 2 each, Rfl: 2    dapagliflozin Propanediol (Farxiga) 10 MG tablet, Take 10 mg (1 tablet) by mouth Daily. Dx code: E11.65, Disp: 30 tablet, Rfl: 2    Diclofenac Sodium (VOLTAREN) 1 % gel gel, Apply 4 g topically to the appropriate area as directed 4 (Four) Times a Day As Needed (hip pain)., Disp: 100 g, Rfl: 2    furosemide (LASIX) 40 MG tablet, Take 1 tablet by mouth Daily. Indications: Edema, Disp: 30 tablet, Rfl: 2    gabapentin (Neurontin) 800 MG tablet, Take 1 tablet by mouth 3 (Three) Times a Day. Ordered through [facility]  Indications: Diabetes with Nerve Disease, Disp: 90 tablet, Rfl: 2    Insulin Glargine (LANTUS SOLOSTAR) 100 UNIT/ML injection pen,  Inject 28 Units under the skin into the appropriate area as directed Every Night. Indications: Type 2 Diabetes, Disp: 30 mL, Rfl: 0    Insulin Lispro, 1 Unit Dial, (HumaLOG KwikPen) 100 UNIT/ML solution pen-injector, Inject 7 Units under the skin into the appropriate area as directed 3 (Three) Times a Day Before Meals. Dx code: E11.65, Disp: 15 mL, Rfl: 2    losartan (COZAAR) 25 MG tablet, Take 1 tablet by mouth Daily for 30 days., Disp: 30 tablet, Rfl: 0    metoclopramide (Reglan) 10 MG tablet, Take 1 tablet by mouth 4 (Four) Times a Day. Indications: Hiccups that are Hard to Cure, Nausea and Vomiting, Disp: 120 tablet, Rfl: 3    metoprolol succinate XL (TOPROL-XL) 25 MG 24 hr tablet, Take 1 tablet by mouth Every 12 (Twelve) Hours for 30 days., Disp: 60 tablet, Rfl: 0    mirtazapine (REMERON) 30 MG tablet, Take 1 tablet by mouth every night at bedtime. Indications: Major Depressive Disorder, Disp: 30 tablet, Rfl: 2    pain patient supplied pump, by Intrathecal route Continuous., Disp: , Rfl:     PONATinib HCl (Iclusig) 10 MG tablet, Take 10 mg by mouth Every Other Day. Take with or without food.  Swallow whole, do not crush or chew., Disp: 30 tablet, Rfl: 2    rivaroxaban (Xarelto) 10 MG tablet, Take 1 tablet by mouth Daily., Disp: 30 tablet, Rfl: 6    tiZANidine (ZANAFLEX) 4 MG tablet, Take 1 tablet by mouth Daily. Indications: Musculoskeletal Pain, Disp: 90 tablet, Rfl: 1  No current facility-administered medications for this visit.    Facility-Administered Medications Ordered in Other Visits:     acetaminophen (TYLENOL) tablet 650 mg, 650 mg, Oral, Once, Denisha Gill APRN    No Known Allergies    Family History   Problem Relation Age of Onset    Diabetes Mother     Diabetes Maternal Grandmother     Heart attack Maternal Grandmother     Stroke Maternal Grandmother        Cancer-related family history is not on file.    Social History     Tobacco Use    Smoking status: Never    Smokeless tobacco: Never  "  Vaping Use    Vaping status: Never Used   Substance Use Topics    Alcohol use: No    Drug use: No       I have reviewed and confirmed the accuracy of the patient's history: Chief complaint, HPI, ROS, and Subjective as entered by the MA/LPN/RN. Meghann Lerma MD 08/15/24 8/15/2024    SUBJECTIVE:    Patient remains on ponatinib 10 mg every other day.  She denies any new issues      Patient is here today for routine follow-up and does not have any new issues.  8/15/2024      Nieves Mc reports a pain score of 0.  Given her pain assessment as noted, treatment options were discussed and the following options were decided upon as a follow-up plan to address the patient's pain: referral to Physical Therapy.     Prescription for Voltaren gel, continue pain management with specialist      .REVIEW OF SYSTEMS:    Review of Systems   Constitutional: Negative for chills and fever.   HENT: Negative for ear pain, mouth sores, nosebleeds and sore throat.    Eyes: Negative for photophobia and visual disturbance.   Respiratory: Negative for wheezing and stridor.    Cardiovascular: Negative for chest pain and palpitations.   Gastrointestinal: Negative for abdominal pain, diarrhea,.  She has nausea  Endocrine: Negative for cold intolerance and heat intolerance.   Genitourinary: Negative for dysuria and hematuria.   Musculoskeletal: Negative for joint swelling and neck stiffness.  Positive for back and leg pain.   Skin: Negative for color change and rash.   Neurological: Negative for seizures and syncope.   Hematological: Negative for adenopathy.        No obvious bleeding   Psychiatric/Behavioral: Negative for agitation, confusion and hallucinations. Positive for insomnia, anxiety, depression.      OBJECTIVE:    Vitals:    08/15/24 1345   BP: 166/94   Pulse: 103   Resp: 18   Temp: 98 °F (36.7 °C)   TempSrc: Infrared   SpO2: 92%   Height: 160 cm (63\")   PainSc: 0-No pain                 Body mass index is 29.23 " kg/m².    ECOG  (1) Restricted in physically strenuous activity, ambulatory and able to do work of light nature    Physical Exam  Vitals and nursing note reviewed.   Constitutional:       General: She is not in acute distress.     Appearance: She is not diaphoretic.   HENT:      Head: Normocephalic and atraumatic.   Eyes:      General: No scleral icterus.        Right eye: No discharge.         Left eye: No discharge.      Conjunctiva/sclera: Conjunctivae normal.   Neck:      Thyroid: No thyromegaly.   Cardiovascular:      Rate and Rhythm: Normal rate and regular rhythm.      Heart sounds: Normal heart sounds. No friction rub. No gallop.    Bilateral lower extremity edema, 2+  Pulmonary:      Effort: Pulmonary effort is normal. No respiratory distress.      Breath sounds: No stridor. No wheezing.   Abdominal:      General: Bowel sounds are normal.      Palpations: Abdomen is soft. There is no mass.      Tenderness: There is no abdominal tenderness. There is no guarding or rebound.   Musculoskeletal:         General: No tenderness. Normal range of motion.      Cervical back: Normal range of motion and neck supple.   Lymphadenopathy:      Cervical: No cervical adenopathy.   Skin:     General: Skin is warm.      Findings: No erythema or rash.   Neurological:      Mental Status: She is alert and oriented to person, place, and time.      Motor: No abnormal muscle tone.   Psychiatric:         Behavior: Behavior    I have reexamined the patient and the results are consistent with the previously documented exam. Meghann Lerma MD          RECENT LABS    WBC   Date Value Ref Range Status   08/15/2024 4.84 3.40 - 10.80 10*3/mm3 Final     RBC   Date Value Ref Range Status   08/15/2024 3.74 (L) 3.77 - 5.28 10*6/mm3 Final     Hemoglobin   Date Value Ref Range Status   08/15/2024 10.4 (L) 12.0 - 15.9 g/dL Final     Hematocrit   Date Value Ref Range Status   08/15/2024 34.3 34.0 - 46.6 % Final     MCV   Date Value Ref  Range Status   08/15/2024 91.7 79.0 - 97.0 fL Final     MCH   Date Value Ref Range Status   08/15/2024 27.8 26.6 - 33.0 pg Final     MCHC   Date Value Ref Range Status   08/15/2024 30.3 (L) 31.5 - 35.7 g/dL Final     RDW   Date Value Ref Range Status   08/15/2024 19.6 (H) 12.3 - 15.4 % Final     RDW-SD   Date Value Ref Range Status   08/15/2024 62.4 (H) 37.0 - 54.0 fl Final     MPV   Date Value Ref Range Status   08/15/2024 12.0 6.0 - 12.0 fL Final     Platelets   Date Value Ref Range Status   08/15/2024 135 (L) 140 - 450 10*3/mm3 Final     Neutrophil %   Date Value Ref Range Status   08/15/2024 66.3 42.7 - 76.0 % Final     Lymphocyte %   Date Value Ref Range Status   08/15/2024 27.7 19.6 - 45.3 % Final     Monocyte %   Date Value Ref Range Status   08/15/2024 5.2 5.0 - 12.0 % Final     Eosinophil %   Date Value Ref Range Status   08/15/2024 0.2 (L) 0.3 - 6.2 % Final     Basophil %   Date Value Ref Range Status   08/15/2024 0.6 0.0 - 1.5 % Final     External Neutrophils Abs   Date Value Ref Range Status   11/30/2022 7.6 (H) 1.7 - 6.0 x10(3)/ul Final     Neutrophils, Absolute   Date Value Ref Range Status   08/15/2024 3.21 1.70 - 7.00 10*3/mm3 Final     Lymphocytes, Absolute   Date Value Ref Range Status   08/15/2024 1.34 0.70 - 3.10 10*3/mm3 Final     Monocytes, Absolute   Date Value Ref Range Status   08/15/2024 0.25 0.10 - 0.90 10*3/mm3 Final     Eosinophils, Absolute   Date Value Ref Range Status   08/15/2024 0.01 0.00 - 0.40 10*3/mm3 Final     Basophils, Absolute   Date Value Ref Range Status   08/15/2024 0.03 0.00 - 0.20 10*3/mm3 Final     nRBC   Date Value Ref Range Status   05/08/2024 3.0 (H) 0.0 - 0.2 /100 WBC Final   12/04/2023 0.3 (H) 0.0 - 0.2 /100 WBC Final       Lab Results   Component Value Date    GLUCOSE 242 (H) 07/18/2024    BUN 22 (H) 07/18/2024    CREATININE 0.86 07/18/2024    EGFRIFNONA 133 02/02/2022    BCR 25.6 (H) 07/18/2024    K 4.7 07/18/2024    CO2 25.5 07/18/2024    CALCIUM 9.3 07/18/2024     ALBUMIN 4.0 07/18/2024    LABIL2 1.0 04/18/2019    AST 41 (H) 07/18/2024    ALT 43 (H) 07/18/2024           ASSESSMENT    Accelerated phase CML, status post bone marrow biopsy on 11/3/2022.  Patient was on ponatinib but developed significant pancytopenia and therefore treatment has been held.  Patient was not able to tolerate ponatinib despite significantly low doses at 10 mg every other day.  She is not now willing to retry this.  Will therefore restart ponatinib 10 mg every other day.  Continue ponatinib.  Her counts today are stable  Discontinue Sprycel due to lack of tolerance  Thrombocytosis likely secondary to uncontrolled malignancy.  Platelets are 1 45,000 today  Hypercalcemia likely secondary to malignancy: CMP is pending  Nausea vomiting secondary to Sprycel, likely she also has gastroparesis from longstanding uncontrolled diabetes: Will refer to GI for evaluation  Pain management issues, patient initially refusing to follow-up with her physician at [facility].  Patient has been seen by her pain physician.  She will continue follow-up with our pain physicians  Pancytopenia: Reviewed  BCR ABL kinase mutation assay was negative  Cardiomyopathy her most recent echo shows an EF of 44% which is an improvement from 26 to 30%.  Patient encouraged to follow-up with her cardiologist  Anxiety: Continue anxiolytics  CML in chronic phase with no significant hematologic or molecular or cytogenetic response on   Imatinib.  ABL kinase mutational analysis was negative.  We  have represcribed to Tasigna 300 mg twice a day.  Patient has been noncompliant with treatments and ended up in the hospital with hyperleukocytosis, peripheral blood blasts.  Bone marrow biopsy suggest that she remains in chronic phase.  Continue to Tasiigna at the current doses.  Hydroxyurea has been discontinued.  Uncontrolled diabetes type 1, followed at the [facility] by Dr. Calles  Chronic anemia: Stable,  multifactorial from CML, to Tasigna, hydroxyurea.  This has improved  Insomnia  Situational anxiety, not suicidal.  Continue Xanax 0.5 mg once daily  History of medical noncompliance  Pulmonary embolism: Xarelto restarted  Recent COVID-19 pneumonia  History of prolonged QTC        Plans    Continue ponatinib  She declines port placement   consult to help with transportation for weekly CBCs  Bone scan reviewed and negative for any metastatic disease  Latest 2D echo March 2024 with normal EF  She was discharged from the hospital on Reglan 10 mg p.o. 4 times a day for nausea  Increase mirtazapine to 30 mg p.o. nightly for depression and insomnia  Home health physical therapy ordered for bilateral hip osteoarthritis, Voltaren gel added  Follow-up with Dr. Lerma in 2 weeks as previously scheduled  Request additional records from [facility]  DC trazodone 25 mg due to QT prolongation  Continue to follow-up with cardiology in regards to dosing of her diuretics.  EKG reviewed.  She has slight prolongation of QT.  We will discontinue Zofran and Phenergan and use Compazine as needed for nausea.  Prescription has been sent to her pharmacy  Refill Xanax  Continue to follow-up with PCP per routine  Follow-up with pain management per routine  Refilled Lasix per hospital discharge notes.  Has an appointment to establish with PCP on 5/9/2024.  Follow-up 6 weeks or earlier as needed        I spent 30 total minutes, face-to-face, caring for Hope today. 90% of this time involved counseling and/or coordination of care as documented within this note.

## 2024-08-15 NOTE — PROGRESS NOTES
Specialty Pharmacy Note: Iclusig (ponatinib)    Nieves Mc is a 48 y.o. female with chronic myelocytic leukemia was seen today by Dr. Lerma. Per provider dictation, no changes to oral oncology regimen Iclusig (ponatinib).  Labs Review: The CMP and CBC from 8/15/24 have been reviewed. No dose adjustments are needed for the oral specialty medication(s) based on the labs.    Specialty pharmacy will continue to follow patient.    Ros CALHOUN, PharmD    8/15/2024  17:27 EDT

## 2024-08-19 DIAGNOSIS — E11.42 TYPE 2 DIABETES MELLITUS WITH DIABETIC POLYNEUROPATHY, WITH LONG-TERM CURRENT USE OF INSULIN: Chronic | ICD-10-CM

## 2024-08-19 DIAGNOSIS — Z79.4 TYPE 2 DIABETES MELLITUS WITH DIABETIC POLYNEUROPATHY, WITH LONG-TERM CURRENT USE OF INSULIN: Chronic | ICD-10-CM

## 2024-08-20 RX ORDER — FUROSEMIDE 40 MG/1
40 TABLET ORAL DAILY
Qty: 30 TABLET | Refills: 2 | Status: SHIPPED | OUTPATIENT
Start: 2024-08-20

## 2024-08-20 RX ORDER — INSULIN LISPRO 100 [IU]/ML
7 INJECTION, SOLUTION INTRAVENOUS; SUBCUTANEOUS
Qty: 15 ML | Refills: 2 | Status: SHIPPED | OUTPATIENT
Start: 2024-08-20

## 2024-08-20 RX ORDER — ONDANSETRON 4 MG/1
4 TABLET, ORALLY DISINTEGRATING ORAL EVERY 8 HOURS PRN
Qty: 30 TABLET | Refills: 1 | Status: SHIPPED | OUTPATIENT
Start: 2024-08-20

## 2024-08-21 ENCOUNTER — SPECIALTY PHARMACY (OUTPATIENT)
Dept: PHARMACY | Facility: HOSPITAL | Age: 49
End: 2024-08-21
Payer: MEDICARE

## 2024-08-21 DIAGNOSIS — C92.10 CML (CHRONIC MYELOCYTIC LEUKEMIA): ICD-10-CM

## 2024-08-21 DIAGNOSIS — C92.10 BLAST CRISIS PHASE OF CHRONIC MYELOID LEUKEMIA: ICD-10-CM

## 2024-08-21 DIAGNOSIS — C92.10 BLAST CRISIS PHASE OF CHRONIC MYELOID LEUKEMIA: Primary | ICD-10-CM

## 2024-08-21 DIAGNOSIS — C92.10 CML (CHRONIC MYELOCYTIC LEUKEMIA): Primary | ICD-10-CM

## 2024-08-21 RX ORDER — PONATINIB HYDROCHLORIDE 10 MG/1
10 TABLET, FILM COATED ORAL EVERY OTHER DAY
Qty: 30 TABLET | Refills: 3 | Status: SHIPPED | OUTPATIENT
Start: 2024-08-21

## 2024-08-21 NOTE — PROGRESS NOTES
Re: Refills of Oral Specialty Medication - Iclusig (ponatinib)    Drug-Drug Interactions: The current medication list was reviewed and there are no relevant drug-drug interactions with the specialty medication.  Medication Allergies: The patient has NKDA  Review of Labs/Dose Adjustments: NO DOSE CHANGE - I reviewed the most recent note and labs and the patient will continue without any dose changes.  I sent refills as described below.    Drug: Iclusig (ponatinib)  Strength: 10 mg  Directions: Take 1 tablet by mouth  every other day. Take with or without food. Swallow whole, do not crush or chew.  Quantity: 30  Refills: 3  Pharmacy prescription sent to: [facility] upon MD signature    Name/Credentials  Ros CALHOUN PharmD      8/21/2024  08:42 EDT

## 2024-08-21 NOTE — PROGRESS NOTES
Drug: Iclusig (ponatinib)  Strength: 10 mg  Directions: Take 1 tablet by mouth  every other day. Take with or without food. Swallow whole, do not crush or chew.  Quantity: 30  Refills: 3  Pharmacy prescription sent to: [facility] upon MD signature    Completed independent double check on medication order/RX.  Javier Hdz, PharmD, BCOP  Clinical Oncology Pharmacist

## 2024-08-22 NOTE — PROGRESS NOTES
"PULMONARY CRITICAL CARE PROGRESS  NOTE      PATIENT IDENTIFICATION:  Name: Nieves Mc  MRN: [id number]  :  1975     Age: 46 y.o.  Sex: female    DATE OF Note:  2022   Referring Physician: Shanelle Rodgers MD                  Subjective:     still having some shortness of breath coughing     no SOB no chest pain, no nausea or vomiting, no change in bowel habit, no dysuria,  no new  skin rash or itching.     Objective:  tMax 24 hrs: Temp (24hrs), Av.7 °F (37.1 °C), Min:97.4 °F (36.3 °C), Max:99.6 °F (37.6 °C)      Vitals Ranges:   Temp:  [97.4 °F (36.3 °C)-99.6 °F (37.6 °C)] 98.1 °F (36.7 °C)  Heart Rate:  [] 88  Resp:  [14-24] 16  BP: ()/(45-77) 127/77    Intake and Output Last 3 Shifts:   I/O last 3 completed shifts:  In: 3220 [P.O.:1200; I.V.:1659; Blood:361]  Out: 900 [Urine:900]    Exam:  /77   Pulse 88   Temp 98.1 °F (36.7 °C) (Oral)   Resp 16   Ht 162.6 cm (64\")   Wt 57.2 kg (126 lb)   LMP 2022 (Approximate)   SpO2 100%   BMI 21.63 kg/m²     General Appearance: Alert awake looks fatigue pale  HEENT:  Normocephalic, without obvious abnormality. Conjunctivae/corneas clear.  Normal external ear canals. Nares normal, no drainage     Neck:  Supple, symmetrical, trachea midline. No JVD.  Lungs /Chest wall:   Bilateral basal rhonchi, respirations unlabored, symmetrical wall movement.     Heart:  Regular rate and rhythm, systolic murmur PMI left sternal border  Abdomen: Soft, nontender, no masses, no organomegaly.    Extremities: Trace edema, no clubbing or cyanosis        Medications:    Current Facility-Administered Medications:   •  acetaminophen (TYLENOL) tablet 650 mg, 650 mg, Oral, Q4H PRN, 650 mg at 22 0849 **OR** acetaminophen (TYLENOL) 160 MG/5ML solution 650 mg, 650 mg, Oral, Q4H PRN **OR** acetaminophen (TYLENOL) suppository 650 mg, 650 mg, Rectal, Q4H PRN, Mónica Tillman MD  •  ALPRAZolam (XANAX) tablet 0.25 mg, 0.25 mg, Oral, Nightly PRN, Primo, " Emergency Department Attending Supervision Note  8/22/2024  8:46 AM    I evaluated this patient in conjunction with Pricila Alonzo PA-C  I have participated in the care of the patient and personally performed key elements of the history, exam, and medical decision making.      HPI:   Kt Tillman is a 78 year old male who presents for a syncopal episode. He had a left TKA 2 days ago. Pain reportedly well controlled. He feels that he isn't urinating as much as normal despite drinking fluids. After he went to the restroom he felt weak and had a controlled fall (assisted by his wife) to the ground. No direct LOC. Feels well since. No fever, CP, dyspnea. Notably, has a history of 4.2 cm thoracic aortic aneurysm.    Independent Historian:   None    Review of External Notes:   DC Summary 8/21/24 ([facility]): POD #1, progressing appropriately. DC'd with outpatient follow up (PT/OT, WBAT, aquacel dressing to remain in place; Ortho follow up in 1 week). Patient on ASA for DVT prophylaxis.     EXAM:    Eyes: PEERL, EOMI B/L  Neck: No JVD noted. FROM   Cardiovascular: normal rate, Regular rhythm and normal heart sounds.  No murmur heard. Equal B/L peripheral pulses.  Pulmonary/Chest: Effort normal and breath sounds normal. No respiratory distress. Patient has no wheezes. Patient has no rales.   Gastrointestinal: Soft. There is no tenderness.   Musculoskeletal/Extremities: there is LLE swelling.  There is some tenderness on the medial aspect of the left calf corresponding with the bruised area.  No direct posterior calf tenderness.  Neurological: Alert  Skin: Skin is warm and dry.  Surgical wound of the left knee is clean, dry, and intact.  There is no surrounding erythema.  Clear Aquacel dressing is in place.  There is some scattered bruising medially and distally in the left lower extremity.   Psychiatric: The patient appears calm.      Assessments, Consultations/Discussion of Management or Tests, Independent  Image interpretation     ED Course as of 08/22/24 1201   Thu Aug 22, 2024   0852 Dr. Thibodeaux discussed the case with Pricila Myrick PA-C. Discussed presentation, exam, ddx, plan.   0900 Dr. Thibodeaux' evaluation.      EKG  ECG results from 08/22/24   EKG 12 lead     Value    Systolic Blood Pressure     Diastolic Blood Pressure     Ventricular Rate 89    Atrial Rate 89    IA Interval 180    QRS Duration 98        QTc 450    P Axis 34    R AXIS -12    T Axis 85    Interpretation ECG      Sinus rhythm  Normal ECG  When compared with ECG of 15-May-2023 15:18,  IA interval has decreased  Vent. rate has increased by  34 bpm  Nonspecific T wave abnormality now evident in Lateral leads  Interpreted by Dr. Thibodeaux @ 0858           Optional/Additional Documentation: None     MEDICAL DECISION MAKING/ASSESSMENT AND PLAN:   This 78-year-old presents to the ED due to a near fainting episode.  A broad differential was considered given his recent surgery, swollen leg, as well as history of thoracic aortic aneurysm.  The patient's D-dimer testing was felt to be elevated due to his recent surgery at baseline so this was not obtained.  He was felt high risk enough that we proceeded with ultrasound imaging and CT imaging of the chest.  Fortunately, no thrombus was noted.  He has felt otherwise well.  We will encourage continued hydration and follow-up in the outpatient setting.    Impression:    ICD-10-CM    1. Near syncope  R55       2. Postoperative state  Z98.890              DISPOSITION:  KOMAL Thibodeaux, DO  8/22/2024  [facility] EMERGENCY DEPT         Jayden Thibodeaux, DO  08/22/24 1201     MD Mónica, 0.25 mg at 07/05/22 2116  •  aluminum-magnesium hydroxide-simethicone (MAALOX MAX) 400-400-40 MG/5ML suspension 15 mL, 15 mL, Oral, Q6H PRN, Mónica Tillman MD  •  arformoterol (BROVANA) nebulizer solution 15 mcg, 15 mcg, Nebulization, BID - RT, Shanelle Rodgers MD, 15 mcg at 07/06/22 0643  •  budesonide (PULMICORT) nebulizer solution 1 mg, 1 mg, Nebulization, BID - RT, Shanelle Rodgres MD, 1 mg at 07/06/22 0647  •  citalopram (CeleXA) tablet 10 mg, 10 mg, Oral, Daily, Mónica Tillman MD, 10 mg at 07/06/22 0900  •  dextrose (D50W) (25 g/50 mL) IV injection 25 g, 25 g, Intravenous, Q15 Min PRN, Braden Huang MD  •  dextrose (D50W) (25 g/50 mL) IV injection 50 mL, 50 mL, Intravenous, Q1H PRN, Giselle Andre MD  •  dextrose (GLUTOSE) oral gel 15 g, 15 g, Oral, Q15 Min PRN, Braden Huang MD  •  gabapentin (NEURONTIN) capsule 300 mg, 300 mg, Oral, TID, Mónica Tillman MD, 300 mg at 07/06/22 0900  •  glucagon (human recombinant) (GLUCAGEN DIAGNOSTIC) 1 mg in sterile water (preservative free) 1 mL injection, 1 mg, Subcutaneous, PRN, Mónica Tillman MD  •  glucagon (human recombinant) (GLUCAGEN DIAGNOSTIC) 1 mg in sterile water (preservative free) 1 mL injection, 1 mg, Intramuscular, Q15 Min PRN, Braden Huang MD  •  guaiFENesin (MUCINEX) 12 hr tablet 600 mg, 600 mg, Oral, Q12H, Braden Huang MD, 600 mg at 07/06/22 0900  •  guaiFENesin-codeine (GUAIFENESIN AC) 100-10 MG/5ML liquid 5 mL, 5 mL, Oral, Q4H PRN, Braden Huang MD  •  insulin glargine (LANTUS, SEMGLEE) injection 18 Units, 18 Units, Subcutaneous, Daily, Dalton Garcia MD, 18 Units at 07/06/22 0901  •  melatonin tablet 5 mg, 5 mg, Oral, Nightly PRN, Mónica Tillman MD  •  metoprolol succinate XL (TOPROL-XL) 24 hr tablet 25 mg, 25 mg, Oral, Daily, Mónica Tillman MD, 25 mg at 07/06/22 0913  •  ondansetron (ZOFRAN) tablet 4 mg, 4 mg, Oral, Q6H PRN, 4 mg at 07/05/22 0940 **OR** ondansetron (ZOFRAN) injection 4 mg, 4 mg, Intravenous,  Q6H PRN, Mónica Tillman MD  •  oxyCODONE (ROXICODONE) immediate release tablet 10 mg, 10 mg, Oral, Q4H PRN, Leslie Ramirez APRN, 10 mg at 07/06/22 0906  •  sodium chloride 0.45 % 1,000 mL with sodium bicarbonate 8.4 % 75 mEq infusion, , Intravenous, Continuous, Giselle Andre MD, Last Rate: 150 mL/hr at 07/06/22 0458, New Bag at 07/06/22 0458  •  sodium chloride 0.9 % flush 10 mL, 10 mL, Intravenous, PRN, HotRandal roman DO  •  [COMPLETED] Insert peripheral IV, , , Once **AND** sodium chloride 0.9 % flush 10 mL, 10 mL, Intravenous, PRN, Randal Powers DO  •  sodium chloride 0.9 % flush 10 mL, 10 mL, Intravenous, Q12H, Mónica Tillman MD, 10 mL at 07/06/22 0913  •  sodium chloride 0.9 % flush 10 mL, 10 mL, Intravenous, PRN, Mónica Tillman MD  •  sodium zirconium cyclosilicate (LOKELMA) pack 10 g, 10 g, Oral, TID, Giselle Andre MD, 10 g at 07/06/22 0901  •  traZODone (DESYREL) tablet 50 mg, 50 mg, Oral, Nightly, Braden Huang MD, 50 mg at 07/05/22 2116    Data Review:  All labs (24hrs):   Recent Results (from the past 24 hour(s))   Comprehensive Metabolic Panel    Collection Time: 07/05/22 11:05 AM    Specimen: Blood   Result Value Ref Range    Glucose 184 (H) 65 - 99 mg/dL    BUN 61 (H) 6 - 20 mg/dL    Creatinine 0.94 0.57 - 1.00 mg/dL    Sodium 130 (L) 136 - 145 mmol/L    Potassium 7.1 (C) 3.5 - 5.2 mmol/L    Chloride 95 (L) 98 - 107 mmol/L    CO2 23.0 22.0 - 29.0 mmol/L    Calcium 8.6 8.6 - 10.5 mg/dL    Total Protein 7.1 6.0 - 8.5 g/dL    Albumin 3.60 3.50 - 5.20 g/dL    ALT (SGPT) 9 1 - 33 U/L    AST (SGOT) 11 1 - 32 U/L    Alkaline Phosphatase 672 (H) 39 - 117 U/L    Total Bilirubin 0.3 0.0 - 1.2 mg/dL    Globulin 3.5 gm/dL    A/G Ratio 1.0 g/dL    BUN/Creatinine Ratio 64.9 (H) 7.0 - 25.0    Anion Gap 12.0 5.0 - 15.0 mmol/L    eGFR 75.9 >60.0 mL/min/1.73   CBC Auto Differential    Collection Time: 07/05/22 11:05 AM    Specimen: Blood   Result Value Ref Range    WBC 70.90 (C) 3.40 -  10.80 10*3/mm3    RBC 2.72 (L) 3.77 - 5.28 10*6/mm3    Hemoglobin 6.7 (C) 12.0 - 15.9 g/dL    Hematocrit 22.3 (L) 34.0 - 46.6 %    MCV 81.8 79.0 - 97.0 fL    MCH 24.8 (L) 26.6 - 33.0 pg    MCHC 30.3 (L) 31.5 - 35.7 g/dL    RDW 22.2 (H) 12.3 - 15.4 %    RDW-SD 62.1 (H) 37.0 - 54.0 fl    MPV 7.7 6.0 - 12.0 fL    Platelets 169 140 - 450 10*3/mm3   Scan Slide    Collection Time: 07/05/22 11:05 AM    Specimen: Blood   Result Value Ref Range    Scan Slide     Uric Acid    Collection Time: 07/05/22 11:05 AM    Specimen: Blood   Result Value Ref Range    Uric Acid 13.0 (H) 2.4 - 5.7 mg/dL   Manual Differential    Collection Time: 07/05/22 11:05 AM    Specimen: Blood   Result Value Ref Range    Neutrophil % 50.0 42.7 - 76.0 %    Lymphocyte % 9.0 (L) 19.6 - 45.3 %    Monocyte % 3.0 (L) 5.0 - 12.0 %    Eosinophil % 4.0 0.3 - 6.2 %    Basophil % 16.0 (H) 0.0 - 1.5 %    Bands %  6.0 (H) 0.0 - 5.0 %    Metamyelocyte % 2.0 (H) 0.0 - 0.0 %    Myelocyte % 1.0 (H) 0.0 - 0.0 %    Promyelocyte % 3.0 (H) 0.0 - 0.0 %    Blasts % 6.0 (H) 0.0 - 0.0 %    Neutrophils Absolute 39.70 (H) 1.70 - 7.00 10*3/mm3    Lymphocytes Absolute 6.38 (H) 0.70 - 3.10 10*3/mm3    Monocytes Absolute 2.13 (H) 0.10 - 0.90 10*3/mm3    Eosinophils Absolute 2.84 (H) 0.00 - 0.40 10*3/mm3    Basophils Absolute 11.34 (H) 0.00 - 0.20 10*3/mm3    Anisocytosis Slight/1+ None Seen    WBC Morphology Normal Normal    Platelet Morphology Normal Normal   Path Consult Reflex    Collection Time: 07/05/22 11:05 AM    Specimen: Blood   Result Value Ref Range    Pathology Review Yes    POC Glucose Once    Collection Time: 07/05/22 12:08 PM    Specimen: Blood   Result Value Ref Range    Glucose 181 (H) 70 - 105 mg/dL   Type & Screen    Collection Time: 07/05/22  1:07 PM    Specimen: Arm, Right; Blood   Result Value Ref Range    ABO Type A     RH type Positive     Antibody Screen Positive     T&S Expiration Date 7/8/2022 11:59:59 PM    Antibody Identification    Collection Time:  07/05/22  1:07 PM    Specimen: Arm, Right; Blood   Result Value Ref Range    Anti-E ANTI-E     Anti-S ANTI-S    Sodium, Urine, Random - Urine, Clean Catch    Collection Time: 07/05/22  4:20 PM    Specimen: Urine, Clean Catch   Result Value Ref Range    Sodium, Urine 86 mmol/L   Urinalysis With Microscopic If Indicated (No Culture) - Urine, Clean Catch    Collection Time: 07/05/22  4:20 PM    Specimen: Urine, Clean Catch   Result Value Ref Range    Color, UA Yellow Yellow, Straw    Appearance, UA Clear Clear    pH, UA <=5.0 5.0 - 8.0    Specific Gravity, UA 1.009 1.005 - 1.030    Glucose, UA Negative Negative    Ketones, UA Negative Negative    Bilirubin, UA Negative Negative    Blood, UA Large (3+) (A) Negative    Protein, UA Negative Negative    Leuk Esterase, UA Trace (A) Negative    Nitrite, UA Negative Negative    Urobilinogen, UA 0.2 E.U./dL 0.2 - 1.0 E.U./dL   Urinalysis, Microscopic Only - Urine, Clean Catch    Collection Time: 07/05/22  4:20 PM    Specimen: Urine, Clean Catch   Result Value Ref Range    RBC, UA Too Numerous to Count (A) None Seen /HPF    WBC, UA 0-2 (A) None Seen /HPF    Bacteria, UA None Seen None Seen /HPF    Squamous Epithelial Cells, UA 0-2 None Seen, 0-2 /HPF    Hyaline Casts, UA 0-2 None Seen /LPF    Methodology Automated Microscopy    POC Glucose Once    Collection Time: 07/05/22  4:30 PM    Specimen: Blood   Result Value Ref Range    Glucose 186 (H) 70 - 105 mg/dL   Basic Metabolic Panel    Collection Time: 07/05/22  4:46 PM    Specimen: Blood   Result Value Ref Range    Glucose 205 (H) 65 - 99 mg/dL    BUN 60 (H) 6 - 20 mg/dL    Creatinine 0.98 0.57 - 1.00 mg/dL    Sodium 131 (L) 136 - 145 mmol/L    Potassium 6.4 (C) 3.5 - 5.2 mmol/L    Chloride 96 (L) 98 - 107 mmol/L    CO2 25.0 22.0 - 29.0 mmol/L    Calcium 8.9 8.6 - 10.5 mg/dL    BUN/Creatinine Ratio 61.2 (H) 7.0 - 25.0    Anion Gap 10.0 5.0 - 15.0 mmol/L    eGFR 72.2 >60.0 mL/min/1.73   ECG 12 Lead    Collection Time: 07/05/22   4:54 PM   Result Value Ref Range    QT Interval 348 ms   Prepare RBC, 1 Units    Collection Time: 07/05/22  9:56 PM   Result Value Ref Range    Product Code P1127J45     Unit Number [id number]     UNIT  ABO O     UNIT  RH POS     Crossmatch Interpretation Compatible     Dispense Status XM     Blood Expiration Date 202207192359     Blood Type Barcode 5100    Prepare RBC, 1 Units    Collection Time: 07/05/22 10:42 PM   Result Value Ref Range    Product Code L2155N32     Unit Number [id number]     UNIT  ABO A     UNIT  RH POS     Crossmatch Interpretation Compatible     Dispense Status IS     Blood Expiration Date 202208082359     Blood Type Barcode 6200    Protime-INR    Collection Time: 07/06/22  4:08 AM    Specimen: Blood   Result Value Ref Range    Protime 11.2 9.6 - 11.7 Seconds    INR 1.09 0.93 - 1.10   aPTT    Collection Time: 07/06/22  4:08 AM    Specimen: Blood   Result Value Ref Range    PTT 26.9 24.0 - 31.0 seconds   Comprehensive Metabolic Panel    Collection Time: 07/06/22  4:08 AM    Specimen: Blood   Result Value Ref Range    Glucose 220 (H) 65 - 99 mg/dL    BUN 45 (H) 6 - 20 mg/dL    Creatinine 0.73 0.57 - 1.00 mg/dL    Sodium 136 136 - 145 mmol/L    Potassium 4.9 3.5 - 5.2 mmol/L    Chloride 100 98 - 107 mmol/L    CO2 25.0 22.0 - 29.0 mmol/L    Calcium 8.5 (L) 8.6 - 10.5 mg/dL    Total Protein 6.5 6.0 - 8.5 g/dL    Albumin 3.10 (L) 3.50 - 5.20 g/dL    ALT (SGPT) 6 1 - 33 U/L    AST (SGOT) 7 1 - 32 U/L    Alkaline Phosphatase 527 (H) 39 - 117 U/L    Total Bilirubin 0.3 0.0 - 1.2 mg/dL    Globulin 3.4 gm/dL    A/G Ratio 0.9 g/dL    BUN/Creatinine Ratio 61.6 (H) 7.0 - 25.0    Anion Gap 11.0 5.0 - 15.0 mmol/L    eGFR 102.9 >60.0 mL/min/1.73   CK    Collection Time: 07/06/22  4:08 AM    Specimen: Blood   Result Value Ref Range    Creatine Kinase 10 (L) 20 - 180 U/L   CBC Auto Differential    Collection Time: 07/06/22  4:08 AM    Specimen: Blood   Result Value Ref Range    WBC 43.80 (C) 3.40 -  10.80 10*3/mm3    RBC 2.79 (L) 3.77 - 5.28 10*6/mm3    Hemoglobin 7.0 (L) 12.0 - 15.9 g/dL    Hematocrit 22.5 (L) 34.0 - 46.6 %    MCV 80.7 79.0 - 97.0 fL    MCH 24.9 (L) 26.6 - 33.0 pg    MCHC 30.9 (L) 31.5 - 35.7 g/dL    RDW 20.8 (H) 12.3 - 15.4 %    RDW-SD 58.2 (H) 37.0 - 54.0 fl    MPV 7.5 6.0 - 12.0 fL    Platelets 145 140 - 450 10*3/mm3   Magnesium    Collection Time: 07/06/22  4:08 AM    Specimen: Blood   Result Value Ref Range    Magnesium 2.2 1.6 - 2.6 mg/dL   Phosphorus    Collection Time: 07/06/22  4:08 AM    Specimen: Blood   Result Value Ref Range    Phosphorus 5.4 (H) 2.5 - 4.5 mg/dL   Uric Acid    Collection Time: 07/06/22  4:08 AM    Specimen: Blood   Result Value Ref Range    Uric Acid 6.4 (H) 2.4 - 5.7 mg/dL   Green Top (Gel)    Collection Time: 07/06/22  4:08 AM   Result Value Ref Range    Extra Tube Hold for add-ons.    Scan Slide    Collection Time: 07/06/22  4:08 AM    Specimen: Blood   Result Value Ref Range    Scan Slide     Manual Differential    Collection Time: 07/06/22  4:08 AM    Specimen: Blood   Result Value Ref Range    Neutrophil % 34.0 (L) 42.7 - 76.0 %    Lymphocyte % 19.0 (L) 19.6 - 45.3 %    Monocyte % 2.0 (L) 5.0 - 12.0 %    Eosinophil % 3.0 0.3 - 6.2 %    Basophil % 15.0 (H) 0.0 - 1.5 %    Bands %  2.0 0.0 - 5.0 %    Metamyelocyte % 10.0 (H) 0.0 - 0.0 %    Myelocyte % 5.0 (H) 0.0 - 0.0 %    Blasts % 10.0 (H) 0.0 - 0.0 %    Neutrophils Absolute 15.77 (H) 1.70 - 7.00 10*3/mm3    Lymphocytes Absolute 8.32 (H) 0.70 - 3.10 10*3/mm3    Monocytes Absolute 0.88 0.10 - 0.90 10*3/mm3    Eosinophils Absolute 1.31 (H) 0.00 - 0.40 10*3/mm3    Basophils Absolute 6.57 (H) 0.00 - 0.20 10*3/mm3    nRBC 2.0 (H) 0.0 - 0.2 /100 WBC    Anisocytosis Slight/1+ None Seen    Microcytes Slight/1+ None Seen    Poikilocytes Slight/1+ None Seen    WBC Morphology Normal Normal    Platelet Morphology Normal Normal   POC Glucose Once    Collection Time: 07/06/22  7:21 AM    Specimen: Blood   Result Value Ref  Range    Glucose 202 (H) 70 - 105 mg/dL        Imaging:  XR Chest 1 View  Narrative: DATE OF EXAM:  7/6/2022 5:29 AM     PROCEDURE:  XR CHEST 1 VW-     INDICATIONS:  Shortness of breath; C92.10-Chronic myeloid leukemia, BCR/ABL-positive,  not having achieved remission; J18.9-Pneumonia, unspecified organism;  I50.9-Heart failure, unspecified     COMPARISON:  07/03/2022     TECHNIQUE:   Single radiographic AP view of the chest was obtained.     FINDINGS:  Low lung volumes. Stable cardiomegaly. Pulmonary vasculature felt to be  normal accounting for lung volumes. Mild atelectasis in the lung bases.  Lungs otherwise grossly clear      Impression: Cardiomegaly with no evidence of pulmonary edema. Atelectasis in the  lung bases due to low lung volumes     Electronically Signed By-Dom Corral On:7/6/2022 8:09 AM  This report was finalized on [id number] by  Dom Corral, .       ASSESSMENT:  Acute respiratory distress  Lung infiltrate could be due to hematology malignancy, pneumonitis  Hyperkalemia  Blast crisis  CML (chronic myelocytic leukemia) (HCC)    Depression with anxiety    History of pulmonary embolism    Type 2 diabetes mellitus with diabetic polyneuropathy, with long-term current use of insulin (HCC)    Moderate malnutrition (HCC)    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)    Acute systolic CHF (congestive heart failure) (HCC)    Menorrhagia       PLAN:  Correct potassium  Watch h/h  Bone marrow biopsy today  Regard the lung infiltrate we will going to monitor for now as long as her oxygenation improving   Bronchodilator  Inhaled corticosteroids  Electrolytes/ glycemic control  DVT and GI prophylaxis.    Total Critical care time in direct medical management (   ) minutes. This time specifically excludes time spent performing procedures.  Shanelle Rodgers MD. D, ABSM.     7/6/2022  09:19 EDT

## 2024-08-27 DIAGNOSIS — E11.42 TYPE 2 DIABETES MELLITUS WITH DIABETIC POLYNEUROPATHY, WITH LONG-TERM CURRENT USE OF INSULIN: Chronic | ICD-10-CM

## 2024-08-27 DIAGNOSIS — Z79.4 TYPE 2 DIABETES MELLITUS WITH DIABETIC POLYNEUROPATHY, WITH LONG-TERM CURRENT USE OF INSULIN: Chronic | ICD-10-CM

## 2024-08-27 RX ORDER — ONDANSETRON 4 MG/1
4 TABLET, ORALLY DISINTEGRATING ORAL EVERY 8 HOURS PRN
Qty: 30 TABLET | Refills: 1 | OUTPATIENT
Start: 2024-08-27

## 2024-08-27 RX ORDER — INSULIN LISPRO 100 [IU]/ML
7 INJECTION, SOLUTION INTRAVENOUS; SUBCUTANEOUS
Qty: 15 ML | Refills: 2 | OUTPATIENT
Start: 2024-08-27

## 2024-08-27 RX ORDER — FUROSEMIDE 40 MG
40 TABLET ORAL DAILY
Qty: 30 TABLET | Refills: 2 | OUTPATIENT
Start: 2024-08-27

## 2024-08-29 ENCOUNTER — LAB (OUTPATIENT)
Dept: LAB | Facility: HOSPITAL | Age: 49
End: 2024-08-29
Payer: MEDICARE

## 2024-08-29 DIAGNOSIS — C92.10 CML (CHRONIC MYELOCYTIC LEUKEMIA): ICD-10-CM

## 2024-08-29 LAB
BASOPHILS # BLD AUTO: 0 10*3/MM3 (ref 0–0.2)
BASOPHILS NFR BLD AUTO: 0 % (ref 0–1.5)
DEPRECATED RDW RBC AUTO: 59 FL (ref 37–54)
EOSINOPHIL # BLD AUTO: 0.04 10*3/MM3 (ref 0–0.4)
EOSINOPHIL NFR BLD AUTO: 1.2 % (ref 0.3–6.2)
ERYTHROCYTE [DISTWIDTH] IN BLOOD BY AUTOMATED COUNT: 18.3 % (ref 12.3–15.4)
HCT VFR BLD AUTO: 34.4 % (ref 34–46.6)
HGB BLD-MCNC: 10.4 G/DL (ref 12–15.9)
LYMPHOCYTES # BLD AUTO: 1.18 10*3/MM3 (ref 0.7–3.1)
LYMPHOCYTES NFR BLD AUTO: 35 % (ref 19.6–45.3)
MCH RBC QN AUTO: 28 PG (ref 26.6–33)
MCHC RBC AUTO-ENTMCNC: 30.2 G/DL (ref 31.5–35.7)
MCV RBC AUTO: 92.5 FL (ref 79–97)
MONOCYTES # BLD AUTO: 0.23 10*3/MM3 (ref 0.1–0.9)
MONOCYTES NFR BLD AUTO: 6.8 % (ref 5–12)
NEUTROPHILS NFR BLD AUTO: 1.92 10*3/MM3 (ref 1.7–7)
NEUTROPHILS NFR BLD AUTO: 57 % (ref 42.7–76)
PLATELET # BLD AUTO: 80 10*3/MM3 (ref 140–450)
PMV BLD AUTO: 10.8 FL (ref 6–12)
RBC # BLD AUTO: 3.72 10*6/MM3 (ref 3.77–5.28)
WBC NRBC COR # BLD AUTO: 3.37 10*3/MM3 (ref 3.4–10.8)

## 2024-08-29 PROCEDURE — 85025 COMPLETE CBC W/AUTO DIFF WBC: CPT

## 2024-08-29 PROCEDURE — 36415 COLL VENOUS BLD VENIPUNCTURE: CPT

## 2024-08-30 ENCOUNTER — SPECIALTY PHARMACY (OUTPATIENT)
Dept: PHARMACY | Facility: HOSPITAL | Age: 49
End: 2024-08-30
Payer: MEDICARE

## 2024-08-30 NOTE — PROGRESS NOTES
Specialty Pharmacy Note: Iclusig (ponatinib)    Nieves Mc is a 48 y.o. female with chronic myelocytic leukemia was seen 8/29/24 for labsLabs Review: The  CBC from 8/29/24 have been reviewed. No dose adjustments are needed for the oral specialty medication(s) based on the labs.Platelet count currently 80,000 and per UpToDate, no dose modification needed unless count below 50,000. Repeat labs ordered for 1 week.    Specialty pharmacy will continue to follow patient.    Ros CALHOUN, PharmD    8/30/2024  17:25 EDT

## 2024-09-02 ENCOUNTER — PATIENT MESSAGE (OUTPATIENT)
Dept: FAMILY MEDICINE CLINIC | Facility: CLINIC | Age: 49
End: 2024-09-02
Payer: MEDICARE

## 2024-09-02 DIAGNOSIS — Z79.4 TYPE 2 DIABETES MELLITUS WITH DIABETIC POLYNEUROPATHY, WITH LONG-TERM CURRENT USE OF INSULIN: Chronic | ICD-10-CM

## 2024-09-02 DIAGNOSIS — E11.42 TYPE 2 DIABETES MELLITUS WITH DIABETIC POLYNEUROPATHY, WITH LONG-TERM CURRENT USE OF INSULIN: Chronic | ICD-10-CM

## 2024-09-02 RX ORDER — INSULIN LISPRO 100 [IU]/ML
7 INJECTION, SOLUTION INTRAVENOUS; SUBCUTANEOUS
Qty: 15 ML | Refills: 2 | Status: SHIPPED | OUTPATIENT
Start: 2024-09-02

## 2024-09-05 ENCOUNTER — SPECIALTY PHARMACY (OUTPATIENT)
Dept: PHARMACY | Facility: HOSPITAL | Age: 49
End: 2024-09-05
Payer: MEDICARE

## 2024-09-05 ENCOUNTER — LAB (OUTPATIENT)
Dept: LAB | Facility: HOSPITAL | Age: 49
End: 2024-09-05
Payer: MEDICARE

## 2024-09-05 DIAGNOSIS — C92.10 CML (CHRONIC MYELOCYTIC LEUKEMIA): ICD-10-CM

## 2024-09-05 LAB
BASOPHILS # BLD AUTO: 0.01 10*3/MM3 (ref 0–0.2)
BASOPHILS NFR BLD AUTO: 0.3 % (ref 0–1.5)
DEPRECATED RDW RBC AUTO: 56.4 FL (ref 37–54)
EOSINOPHIL # BLD AUTO: 0.05 10*3/MM3 (ref 0–0.4)
EOSINOPHIL NFR BLD AUTO: 1.7 % (ref 0.3–6.2)
ERYTHROCYTE [DISTWIDTH] IN BLOOD BY AUTOMATED COUNT: 17.8 % (ref 12.3–15.4)
HCT VFR BLD AUTO: 33.3 % (ref 34–46.6)
HGB BLD-MCNC: 10.6 G/DL (ref 12–15.9)
LYMPHOCYTES # BLD AUTO: 1.32 10*3/MM3 (ref 0.7–3.1)
LYMPHOCYTES NFR BLD AUTO: 44.6 % (ref 19.6–45.3)
MCH RBC QN AUTO: 28.7 PG (ref 26.6–33)
MCHC RBC AUTO-ENTMCNC: 31.8 G/DL (ref 31.5–35.7)
MCV RBC AUTO: 90.2 FL (ref 79–97)
MONOCYTES # BLD AUTO: 0.21 10*3/MM3 (ref 0.1–0.9)
MONOCYTES NFR BLD AUTO: 7.1 % (ref 5–12)
NEUTROPHILS NFR BLD AUTO: 1.37 10*3/MM3 (ref 1.7–7)
NEUTROPHILS NFR BLD AUTO: 46.3 % (ref 42.7–76)
PLATELET # BLD AUTO: 54 10*3/MM3 (ref 140–450)
PMV BLD AUTO: 10.9 FL (ref 6–12)
RBC # BLD AUTO: 3.69 10*6/MM3 (ref 3.77–5.28)
WBC NRBC COR # BLD AUTO: 2.96 10*3/MM3 (ref 3.4–10.8)

## 2024-09-05 PROCEDURE — 85025 COMPLETE CBC W/AUTO DIFF WBC: CPT

## 2024-09-05 PROCEDURE — 36415 COLL VENOUS BLD VENIPUNCTURE: CPT

## 2024-09-05 NOTE — PROGRESS NOTES
Specialty Pharmacy Note: Iclusig (ponatinib)    Nieves Mc is a 48 y.o. female with chronic myelocytic leukemia was seen today for labs.    Labs Review: The CBC from today has been reviewed.  Due to platelet decrease to 54,000 from 80,000 in one week, per JP Zafar, hold Iclusig.     Contacted patient via telephone to discuss.  She reports that she last took Iclusig on Tuesday and will hold.  She expressed that she no longer wants to take Iclusig and would prefer to have a another trial of Tasigna.  She said she felt awful while taking Tasigna but prefers this than to worry about her labs dropping.  Discussed with patient the importance of keeping follow up lab appointment.    Specialty pharmacy will continue to follow patient.    Ros CALHOUN, PharmD    9/5/2024  15:22 EDT

## 2024-09-06 DIAGNOSIS — C92.10 CML (CHRONIC MYELOCYTIC LEUKEMIA): ICD-10-CM

## 2024-09-06 DIAGNOSIS — F41.9 ANXIETY: ICD-10-CM

## 2024-09-06 RX ORDER — ALPRAZOLAM 0.5 MG
0.5 TABLET ORAL NIGHTLY PRN
Qty: 30 TABLET | Refills: 0 | Status: SHIPPED | OUTPATIENT
Start: 2024-09-06

## 2024-09-10 ENCOUNTER — SPECIALTY PHARMACY (OUTPATIENT)
Dept: PHARMACY | Facility: HOSPITAL | Age: 49
End: 2024-09-10
Payer: MEDICARE

## 2024-09-13 DIAGNOSIS — Z79.4 TYPE 2 DIABETES MELLITUS WITH DIABETIC POLYNEUROPATHY, WITH LONG-TERM CURRENT USE OF INSULIN: Chronic | ICD-10-CM

## 2024-09-13 DIAGNOSIS — E11.42 TYPE 2 DIABETES MELLITUS WITH DIABETIC POLYNEUROPATHY, WITH LONG-TERM CURRENT USE OF INSULIN: Chronic | ICD-10-CM

## 2024-09-16 ENCOUNTER — SPECIALTY PHARMACY (OUTPATIENT)
Dept: PHARMACY | Facility: HOSPITAL | Age: 49
End: 2024-09-16
Payer: MEDICARE

## 2024-09-16 RX ORDER — INSULIN LISPRO 100 [IU]/ML
7 INJECTION, SOLUTION INTRAVENOUS; SUBCUTANEOUS
Qty: 15 ML | Refills: 2 | Status: SHIPPED | OUTPATIENT
Start: 2024-09-16

## 2024-09-17 ENCOUNTER — TELEPHONE (OUTPATIENT)
Dept: ONCOLOGY | Facility: CLINIC | Age: 49
End: 2024-09-17

## 2024-09-17 ENCOUNTER — TELEPHONE (OUTPATIENT)
Dept: ONCOLOGY | Facility: CLINIC | Age: 49
End: 2024-09-17
Payer: MEDICARE

## 2024-09-17 NOTE — TELEPHONE ENCOUNTER
Caller: Nieves Mc A    Relationship to patient: Self    Best call back number: [phone]    Chief complaint: PATIENT CALLED TO RESCHEDULE     Type of visit: LAB AND CHEMO EDUCATION       If rescheduling, when is the original appointment: 9-19-24     Additional notes:PATIENT ALSO WANTED TO ADVISE DARINEL SO SHE CAN RESCHEDULE TRANSPORTATION

## 2024-09-19 ENCOUNTER — TELEPHONE (OUTPATIENT)
Dept: ONCOLOGY | Facility: CLINIC | Age: 49
End: 2024-09-19
Payer: MEDICARE

## 2024-09-19 DIAGNOSIS — Z79.4 TYPE 2 DIABETES MELLITUS WITH DIABETIC POLYNEUROPATHY, WITH LONG-TERM CURRENT USE OF INSULIN: Chronic | ICD-10-CM

## 2024-09-19 DIAGNOSIS — E11.42 TYPE 2 DIABETES MELLITUS WITH DIABETIC POLYNEUROPATHY, WITH LONG-TERM CURRENT USE OF INSULIN: Chronic | ICD-10-CM

## 2024-09-19 RX ORDER — ONDANSETRON 4 MG/1
4 TABLET, ORALLY DISINTEGRATING ORAL EVERY 8 HOURS PRN
Qty: 30 TABLET | Refills: 1 | Status: SHIPPED | OUTPATIENT
Start: 2024-09-19

## 2024-09-19 RX ORDER — FUROSEMIDE 40 MG
40 TABLET ORAL DAILY
Qty: 30 TABLET | Refills: 2 | Status: SHIPPED | OUTPATIENT
Start: 2024-09-19

## 2024-09-23 ENCOUNTER — SPECIALTY PHARMACY (OUTPATIENT)
Dept: PHARMACY | Facility: HOSPITAL | Age: 49
End: 2024-09-23
Payer: MEDICARE

## 2024-09-23 ENCOUNTER — APPOINTMENT (OUTPATIENT)
Dept: PHARMACY | Facility: HOSPITAL | Age: 49
End: 2024-09-23
Payer: MEDICARE

## 2024-09-23 ENCOUNTER — LAB (OUTPATIENT)
Dept: LAB | Facility: HOSPITAL | Age: 49
End: 2024-09-23
Payer: MEDICARE

## 2024-09-23 DIAGNOSIS — C92.10 CML (CHRONIC MYELOCYTIC LEUKEMIA): ICD-10-CM

## 2024-09-23 LAB
BASOPHILS # BLD AUTO: 0.03 10*3/MM3 (ref 0–0.2)
BASOPHILS NFR BLD AUTO: 1.2 % (ref 0–1.5)
DEPRECATED RDW RBC AUTO: 59.4 FL (ref 37–54)
EOSINOPHIL # BLD AUTO: 0.02 10*3/MM3 (ref 0–0.4)
EOSINOPHIL NFR BLD AUTO: 0.8 % (ref 0.3–6.2)
ERYTHROCYTE [DISTWIDTH] IN BLOOD BY AUTOMATED COUNT: 18.5 % (ref 12.3–15.4)
HCT VFR BLD AUTO: 25.4 % (ref 34–46.6)
HGB BLD-MCNC: 7.8 G/DL (ref 12–15.9)
LYMPHOCYTES # BLD AUTO: 1.33 10*3/MM3 (ref 0.7–3.1)
LYMPHOCYTES NFR BLD AUTO: 53.8 % (ref 19.6–45.3)
MCH RBC QN AUTO: 28.7 PG (ref 26.6–33)
MCHC RBC AUTO-ENTMCNC: 30.7 G/DL (ref 31.5–35.7)
MCV RBC AUTO: 93.4 FL (ref 79–97)
MONOCYTES # BLD AUTO: 0.75 10*3/MM3 (ref 0.1–0.9)
MONOCYTES NFR BLD AUTO: 30.4 % (ref 5–12)
NEUTROPHILS NFR BLD AUTO: 0.34 10*3/MM3 (ref 1.7–7)
NEUTROPHILS NFR BLD AUTO: 13.8 % (ref 42.7–76)
PLATELET # BLD AUTO: 517 10*3/MM3 (ref 140–450)
PMV BLD AUTO: 9.6 FL (ref 6–12)
RBC # BLD AUTO: 2.72 10*6/MM3 (ref 3.77–5.28)
WBC NRBC COR # BLD AUTO: 2.47 10*3/MM3 (ref 3.4–10.8)

## 2024-09-23 PROCEDURE — 85025 COMPLETE CBC W/AUTO DIFF WBC: CPT

## 2024-09-23 PROCEDURE — 36415 COLL VENOUS BLD VENIPUNCTURE: CPT

## 2024-09-24 ENCOUNTER — SPECIALTY PHARMACY (OUTPATIENT)
Dept: PHARMACY | Facility: HOSPITAL | Age: 49
End: 2024-09-24
Payer: MEDICARE

## 2024-09-24 ENCOUNTER — TELEPHONE (OUTPATIENT)
Dept: ONCOLOGY | Facility: CLINIC | Age: 49
End: 2024-09-24
Payer: MEDICARE

## 2024-09-24 RX ORDER — LEVOFLOXACIN 500 MG/1
500 TABLET, FILM COATED ORAL DAILY
Qty: 7 TABLET | Refills: 0 | Status: ON HOLD | OUTPATIENT
Start: 2024-09-24

## 2024-09-26 ENCOUNTER — OFFICE VISIT (OUTPATIENT)
Dept: ONCOLOGY | Facility: CLINIC | Age: 49
End: 2024-09-26
Payer: MEDICARE

## 2024-09-26 ENCOUNTER — TELEPHONE (OUTPATIENT)
Dept: ONCOLOGY | Facility: CLINIC | Age: 49
End: 2024-09-26

## 2024-09-26 ENCOUNTER — HOSPITAL ENCOUNTER (OUTPATIENT)
Dept: INFUSION THERAPY | Facility: HOSPITAL | Age: 49
Discharge: HOME | End: 2024-09-26
Payer: MEDICARE

## 2024-09-26 ENCOUNTER — LAB (OUTPATIENT)
Dept: LAB | Facility: HOSPITAL | Age: 49
End: 2024-09-26
Payer: MEDICARE

## 2024-09-26 ENCOUNTER — DOCUMENTATION (OUTPATIENT)
Dept: ONCOLOGY | Facility: CLINIC | Age: 49
End: 2024-09-26
Payer: MEDICARE

## 2024-09-26 VITALS
SYSTOLIC BLOOD PRESSURE: 142 MMHG | HEART RATE: 114 BPM | TEMPERATURE: 98.6 F | BODY MASS INDEX: 29.23 KG/M2 | OXYGEN SATURATION: 91 % | RESPIRATION RATE: 20 BRPM | HEIGHT: 63 IN | DIASTOLIC BLOOD PRESSURE: 84 MMHG

## 2024-09-26 DIAGNOSIS — C92.10 CML (CHRONIC MYELOCYTIC LEUKEMIA): Primary | ICD-10-CM

## 2024-09-26 DIAGNOSIS — D64.9 ANEMIA, UNSPECIFIED TYPE: ICD-10-CM

## 2024-09-26 DIAGNOSIS — C92.10 CML (CHRONIC MYELOCYTIC LEUKEMIA): ICD-10-CM

## 2024-09-26 LAB
ABO GROUP BLD: NORMAL
ANISOCYTOSIS BLD QL: ABNORMAL
BASOPHILS # BLD AUTO: 0.06 10*3/MM3 (ref 0–0.2)
BASOPHILS # BLD MANUAL: 0.08 10*3/MM3 (ref 0–0.2)
BASOPHILS NFR BLD AUTO: 1.8 % (ref 0–1.5)
BASOPHILS NFR BLD MANUAL: 2 % (ref 0–1.5)
BB HOLD TUBE: NORMAL
BLASTS NFR BLD MANUAL: 14 % (ref 0–0)
BLD GP AB SCN SERPL QL: NEGATIVE
DEPRECATED RDW RBC AUTO: 61.5 FL (ref 37–54)
DEPRECATED RDW RBC AUTO: 64.5 FL (ref 37–54)
EOSINOPHIL # BLD AUTO: 0.01 10*3/MM3 (ref 0–0.4)
EOSINOPHIL # BLD MANUAL: 0.08 10*3/MM3 (ref 0–0.4)
EOSINOPHIL NFR BLD AUTO: 0.3 % (ref 0.3–6.2)
EOSINOPHIL NFR BLD MANUAL: 2 % (ref 0.3–6.2)
ERYTHROCYTE [DISTWIDTH] IN BLOOD BY AUTOMATED COUNT: 18.9 % (ref 12.3–15.4)
ERYTHROCYTE [DISTWIDTH] IN BLOOD BY AUTOMATED COUNT: 19 % (ref 12.3–15.4)
HCT VFR BLD AUTO: 24.3 % (ref 34–46.6)
HCT VFR BLD AUTO: 25.2 % (ref 34–46.6)
HGB BLD-MCNC: 7.4 G/DL (ref 12–15.9)
HGB BLD-MCNC: 7.6 G/DL (ref 12–15.9)
LYMPHOCYTES # BLD AUTO: 1.71 10*3/MM3 (ref 0.7–3.1)
LYMPHOCYTES # BLD MANUAL: 2.34 10*3/MM3 (ref 0.7–3.1)
LYMPHOCYTES NFR BLD AUTO: 50.3 % (ref 19.6–45.3)
LYMPHOCYTES NFR BLD MANUAL: 6 % (ref 5–12)
MCH RBC QN AUTO: 28.5 PG (ref 26.6–33)
MCH RBC QN AUTO: 28.8 PG (ref 26.6–33)
MCHC RBC AUTO-ENTMCNC: 30.2 G/DL (ref 31.5–35.7)
MCHC RBC AUTO-ENTMCNC: 30.5 G/DL (ref 31.5–35.7)
MCV RBC AUTO: 93.5 FL (ref 79–97)
MCV RBC AUTO: 95.5 FL (ref 79–97)
METAMYELOCYTES NFR BLD MANUAL: 1 % (ref 0–0)
MICROCYTES BLD QL: ABNORMAL
MONOCYTES # BLD AUTO: 1.13 10*3/MM3 (ref 0.1–0.9)
MONOCYTES # BLD: 0.24 10*3/MM3 (ref 0.1–0.9)
MONOCYTES NFR BLD AUTO: 33.2 % (ref 5–12)
MYELOCYTES NFR BLD MANUAL: 3 % (ref 0–0)
NEUTROPHILS # BLD AUTO: 0.44 10*3/MM3 (ref 1.7–7)
NEUTROPHILS NFR BLD AUTO: 0.49 10*3/MM3 (ref 1.7–7)
NEUTROPHILS NFR BLD AUTO: 14.4 % (ref 42.7–76)
NEUTROPHILS NFR BLD MANUAL: 6 % (ref 42.7–76)
NEUTS BAND NFR BLD MANUAL: 5 % (ref 0–5)
NRBC SPEC MANUAL: 2 /100 WBC (ref 0–0.2)
PATHOLOGY REVIEW: YES
PLATELET # BLD AUTO: 450 10*3/MM3 (ref 140–450)
PLATELET # BLD AUTO: 469 10*3/MM3 (ref 140–450)
PMV BLD AUTO: 10.6 FL (ref 6–12)
PMV BLD AUTO: 9.6 FL (ref 6–12)
PROLYMPHOCYTES NFR BLD MANUAL: 2 % (ref 0–0)
PROMYELOCYTES NFR BLD MANUAL: 1 % (ref 0–0)
RBC # BLD AUTO: 2.6 10*6/MM3 (ref 3.77–5.28)
RBC # BLD AUTO: 2.64 10*6/MM3 (ref 3.77–5.28)
RH BLD: POSITIVE
SCAN SLIDE: NORMAL
SMALL PLATELETS BLD QL SMEAR: ADEQUATE
T&S EXPIRATION DATE: NORMAL
VARIANT LYMPHS NFR BLD MANUAL: 3 % (ref 0–5)
VARIANT LYMPHS NFR BLD MANUAL: 55 % (ref 19.6–45.3)
WBC MORPH BLD: NORMAL
WBC NRBC COR # BLD AUTO: 3.4 10*3/MM3 (ref 3.4–10.8)
WBC NRBC COR # BLD AUTO: 4.04 10*3/MM3 (ref 3.4–10.8)

## 2024-09-26 PROCEDURE — 36415 COLL VENOUS BLD VENIPUNCTURE: CPT

## 2024-09-26 PROCEDURE — 86901 BLOOD TYPING SEROLOGIC RH(D): CPT | Performed by: INTERNAL MEDICINE

## 2024-09-26 PROCEDURE — 86922 COMPATIBILITY TEST ANTIGLOB: CPT

## 2024-09-26 PROCEDURE — 85025 COMPLETE CBC W/AUTO DIFF WBC: CPT

## 2024-09-26 PROCEDURE — 85025 COMPLETE CBC W/AUTO DIFF WBC: CPT | Performed by: INTERNAL MEDICINE

## 2024-09-26 PROCEDURE — 86900 BLOOD TYPING SEROLOGIC ABO: CPT | Performed by: INTERNAL MEDICINE

## 2024-09-26 PROCEDURE — 86850 RBC ANTIBODY SCREEN: CPT | Performed by: INTERNAL MEDICINE

## 2024-09-26 PROCEDURE — 85007 BL SMEAR W/DIFF WBC COUNT: CPT | Performed by: INTERNAL MEDICINE

## 2024-09-26 RX ORDER — SODIUM CHLORIDE 9 MG/ML
250 INJECTION, SOLUTION INTRAVENOUS AS NEEDED
OUTPATIENT
Start: 2024-09-26

## 2024-09-26 RX ORDER — DOXYCYCLINE HYCLATE 100 MG
100 TABLET ORAL 2 TIMES DAILY
Qty: 20 TABLET | Refills: 0 | Status: ON HOLD | OUTPATIENT
Start: 2024-09-26 | End: 2024-10-06

## 2024-09-26 NOTE — TELEPHONE ENCOUNTER
LVM for pt with next lab appt information. Dr. Lerma approved pt coming in in 6 weeks for follow instead of 4 week since she is getting weekly labs.

## 2024-09-26 NOTE — PROGRESS NOTES
Pt here for type and cross.  Butterfly used.  Blood band given to pt to bring back tomorrow for blood transfusion.

## 2024-09-27 ENCOUNTER — HOSPITAL ENCOUNTER (INPATIENT)
Facility: HOSPITAL | Age: 49
LOS: 13 days | Discharge: ANOTHER HOSPITAL | DRG: 193 | End: 2024-10-10
Attending: EMERGENCY MEDICINE
Payer: MEDICARE

## 2024-09-27 ENCOUNTER — SPECIALTY PHARMACY (OUTPATIENT)
Dept: PHARMACY | Facility: HOSPITAL | Age: 49
End: 2024-09-27
Payer: MEDICARE

## 2024-09-27 ENCOUNTER — APPOINTMENT (OUTPATIENT)
Dept: GENERAL RADIOLOGY | Facility: HOSPITAL | Age: 49
DRG: 193 | End: 2024-09-27
Payer: MEDICARE

## 2024-09-27 ENCOUNTER — TELEPHONE (OUTPATIENT)
Dept: ONCOLOGY | Facility: CLINIC | Age: 49
End: 2024-09-27

## 2024-09-27 ENCOUNTER — HOSPITAL ENCOUNTER (OUTPATIENT)
Dept: INFUSION THERAPY | Facility: HOSPITAL | Age: 49
Discharge: HOME | End: 2024-09-27
Payer: MEDICARE

## 2024-09-27 VITALS
HEART RATE: 116 BPM | RESPIRATION RATE: 27 BRPM | OXYGEN SATURATION: 85 % | SYSTOLIC BLOOD PRESSURE: 172 MMHG | DIASTOLIC BLOOD PRESSURE: 87 MMHG | TEMPERATURE: 98.2 F

## 2024-09-27 DIAGNOSIS — D64.9 ANEMIA, UNSPECIFIED TYPE: Primary | ICD-10-CM

## 2024-09-27 DIAGNOSIS — D64.9 ANEMIA, UNSPECIFIED TYPE: ICD-10-CM

## 2024-09-27 DIAGNOSIS — C92.10 CML (CHRONIC MYELOCYTIC LEUKEMIA): ICD-10-CM

## 2024-09-27 DIAGNOSIS — R09.02 HYPOXIA: ICD-10-CM

## 2024-09-27 DIAGNOSIS — J18.9 PNEUMONIA DUE TO INFECTIOUS ORGANISM, UNSPECIFIED LATERALITY, UNSPECIFIED PART OF LUNG: Primary | ICD-10-CM

## 2024-09-27 PROBLEM — J96.01 ACUTE HYPOXEMIC RESPIRATORY FAILURE: Status: ACTIVE | Noted: 2024-09-27

## 2024-09-27 LAB
ALBUMIN SERPL-MCNC: 4 G/DL (ref 3.5–5.2)
ALBUMIN/GLOB SERPL: 1 G/DL
ALP SERPL-CCNC: 317 U/L (ref 39–117)
ALT SERPL W P-5'-P-CCNC: 24 U/L (ref 1–33)
ANION GAP SERPL CALCULATED.3IONS-SCNC: 12.1 MMOL/L (ref 5–15)
APTT PPP: 34.6 SECONDS (ref 61–76.5)
AST SERPL-CCNC: 28 U/L (ref 1–32)
B PARAPERT DNA SPEC QL NAA+PROBE: NOT DETECTED
B PERT DNA SPEC QL NAA+PROBE: NOT DETECTED
BASOPHILS # BLD MANUAL: 0.24 10*3/MM3 (ref 0–0.2)
BASOPHILS NFR BLD MANUAL: 6 % (ref 0–1.5)
BILIRUB SERPL-MCNC: 1.8 MG/DL (ref 0–1.2)
BLASTS NFR BLD MANUAL: 36 % (ref 0–0)
BUN SERPL-MCNC: 34 MG/DL (ref 6–20)
BUN/CREAT SERPL: 26.4 (ref 7–25)
C PNEUM DNA NPH QL NAA+NON-PROBE: NOT DETECTED
CALCIUM SPEC-SCNC: 9 MG/DL (ref 8.6–10.5)
CHLORIDE SERPL-SCNC: 104 MMOL/L (ref 98–107)
CO2 SERPL-SCNC: 22.9 MMOL/L (ref 22–29)
CREAT SERPL-MCNC: 1.29 MG/DL (ref 0.57–1)
D-LACTATE SERPL-SCNC: 0.5 MMOL/L (ref 0.3–2)
DEPRECATED RDW RBC AUTO: 59.2 FL (ref 37–54)
EGFRCR SERPLBLD CKD-EPI 2021: 51.3 ML/MIN/1.73
EOSINOPHIL # BLD MANUAL: 0.04 10*3/MM3 (ref 0–0.4)
EOSINOPHIL NFR BLD MANUAL: 1 % (ref 0.3–6.2)
ERYTHROCYTE [DISTWIDTH] IN BLOOD BY AUTOMATED COUNT: 18 % (ref 12.3–15.4)
FLUAV SUBTYP SPEC NAA+PROBE: NOT DETECTED
FLUBV RNA ISLT QL NAA+PROBE: NOT DETECTED
GLOBULIN UR ELPH-MCNC: 3.9 GM/DL
GLUCOSE BLDC GLUCOMTR-MCNC: 100 MG/DL (ref 70–105)
GLUCOSE SERPL-MCNC: 122 MG/DL (ref 65–99)
HADV DNA SPEC NAA+PROBE: NOT DETECTED
HCOV 229E RNA SPEC QL NAA+PROBE: NOT DETECTED
HCOV HKU1 RNA SPEC QL NAA+PROBE: NOT DETECTED
HCOV NL63 RNA SPEC QL NAA+PROBE: NOT DETECTED
HCOV OC43 RNA SPEC QL NAA+PROBE: NOT DETECTED
HCT VFR BLD AUTO: 24.5 % (ref 34–46.6)
HGB BLD-MCNC: 7.7 G/DL (ref 12–15.9)
HMPV RNA NPH QL NAA+NON-PROBE: NOT DETECTED
HPIV1 RNA ISLT QL NAA+PROBE: NOT DETECTED
HPIV2 RNA SPEC QL NAA+PROBE: NOT DETECTED
HPIV3 RNA NPH QL NAA+PROBE: NOT DETECTED
HPIV4 P GENE NPH QL NAA+PROBE: NOT DETECTED
INR PPP: 1.06 (ref 0.93–1.1)
LAB AP CASE REPORT: NORMAL
LYMPHOCYTES # BLD MANUAL: 1.38 10*3/MM3 (ref 0.7–3.1)
LYMPHOCYTES NFR BLD MANUAL: 5 % (ref 5–12)
M PNEUMO IGG SER IA-ACNC: NOT DETECTED
MCH RBC QN AUTO: 28.7 PG (ref 26.6–33)
MCHC RBC AUTO-ENTMCNC: 31.4 G/DL (ref 31.5–35.7)
MCV RBC AUTO: 91.4 FL (ref 79–97)
METAMYELOCYTES NFR BLD MANUAL: 3 % (ref 0–0)
MONOCYTES # BLD: 0.2 10*3/MM3 (ref 0.1–0.9)
NEUTROPHILS # BLD AUTO: 0.55 10*3/MM3 (ref 1.7–7)
NEUTROPHILS NFR BLD MANUAL: 13 % (ref 42.7–76)
NEUTS BAND NFR BLD MANUAL: 1 % (ref 0–5)
NRBC SPEC MANUAL: 2 /100 WBC (ref 0–0.2)
NT-PROBNP SERPL-MCNC: 4257 PG/ML (ref 0–450)
PATH REPORT.FINAL DX SPEC: NORMAL
PLAT MORPH BLD: NORMAL
PLATELET # BLD AUTO: 448 10*3/MM3 (ref 140–450)
PMV BLD AUTO: 9.7 FL (ref 6–12)
POTASSIUM SERPL-SCNC: 4.8 MMOL/L (ref 3.5–5.2)
PROT SERPL-MCNC: 7.9 G/DL (ref 6–8.5)
PROTHROMBIN TIME: 11.5 SECONDS (ref 9.6–11.7)
QT INTERVAL: 341 MS
QTC INTERVAL: 466 MS
RBC # BLD AUTO: 2.68 10*6/MM3 (ref 3.77–5.28)
RBC MORPH BLD: NORMAL
RHINOVIRUS RNA SPEC NAA+PROBE: NOT DETECTED
RSV RNA NPH QL NAA+NON-PROBE: NOT DETECTED
SARS-COV-2 RNA NPH QL NAA+NON-PROBE: NOT DETECTED
SCAN SLIDE: NORMAL
SODIUM SERPL-SCNC: 139 MMOL/L (ref 136–145)
TROPONIN T SERPL HS-MCNC: 17 NG/L
VARIANT LYMPHS NFR BLD MANUAL: 35 % (ref 19.6–45.3)
WBC MORPH BLD: NORMAL
WBC NRBC COR # BLD AUTO: 3.94 10*3/MM3 (ref 3.4–10.8)

## 2024-09-27 PROCEDURE — 86902 BLOOD TYPE ANTIGEN DONOR EA: CPT

## 2024-09-27 PROCEDURE — 85025 COMPLETE CBC W/AUTO DIFF WBC: CPT | Performed by: EMERGENCY MEDICINE

## 2024-09-27 PROCEDURE — 85610 PROTHROMBIN TIME: CPT | Performed by: EMERGENCY MEDICINE

## 2024-09-27 PROCEDURE — 0202U NFCT DS 22 TRGT SARS-COV-2: CPT | Performed by: EMERGENCY MEDICINE

## 2024-09-27 PROCEDURE — 85007 BL SMEAR W/DIFF WBC COUNT: CPT | Performed by: EMERGENCY MEDICINE

## 2024-09-27 PROCEDURE — 63710000001 INSULIN GLARGINE PER 5 UNITS: Performed by: STUDENT IN AN ORGANIZED HEALTH CARE EDUCATION/TRAINING PROGRAM

## 2024-09-27 PROCEDURE — 36415 COLL VENOUS BLD VENIPUNCTURE: CPT

## 2024-09-27 PROCEDURE — 36430 TRANSFUSION BLD/BLD COMPNT: CPT

## 2024-09-27 PROCEDURE — P9040 RBC LEUKOREDUCED IRRADIATED: HCPCS

## 2024-09-27 PROCEDURE — 84484 ASSAY OF TROPONIN QUANT: CPT | Performed by: EMERGENCY MEDICINE

## 2024-09-27 PROCEDURE — 86900 BLOOD TYPING SEROLOGIC ABO: CPT

## 2024-09-27 PROCEDURE — 83605 ASSAY OF LACTIC ACID: CPT | Performed by: EMERGENCY MEDICINE

## 2024-09-27 PROCEDURE — 87154 CUL TYP ID BLD PTHGN 6+ TRGT: CPT | Performed by: EMERGENCY MEDICINE

## 2024-09-27 PROCEDURE — 99285 EMERGENCY DEPT VISIT HI MDM: CPT

## 2024-09-27 PROCEDURE — 87040 BLOOD CULTURE FOR BACTERIA: CPT | Performed by: EMERGENCY MEDICINE

## 2024-09-27 PROCEDURE — 82948 REAGENT STRIP/BLOOD GLUCOSE: CPT

## 2024-09-27 PROCEDURE — 71045 X-RAY EXAM CHEST 1 VIEW: CPT

## 2024-09-27 PROCEDURE — 85730 THROMBOPLASTIN TIME PARTIAL: CPT | Performed by: EMERGENCY MEDICINE

## 2024-09-27 PROCEDURE — 83880 ASSAY OF NATRIURETIC PEPTIDE: CPT | Performed by: EMERGENCY MEDICINE

## 2024-09-27 PROCEDURE — 25010000002 CEFEPIME PER 500 MG: Performed by: EMERGENCY MEDICINE

## 2024-09-27 PROCEDURE — 80053 COMPREHEN METABOLIC PANEL: CPT | Performed by: EMERGENCY MEDICINE

## 2024-09-27 PROCEDURE — 93005 ELECTROCARDIOGRAM TRACING: CPT | Performed by: EMERGENCY MEDICINE

## 2024-09-27 RX ORDER — MIRTAZAPINE 15 MG/1
15 TABLET, FILM COATED ORAL NIGHTLY
COMMUNITY

## 2024-09-27 RX ORDER — SODIUM CHLORIDE 9 MG/ML
250 INJECTION, SOLUTION INTRAVENOUS AS NEEDED
Status: CANCELLED | OUTPATIENT
Start: 2024-09-27

## 2024-09-27 RX ORDER — SODIUM CHLORIDE 0.9 % (FLUSH) 0.9 %
10 SYRINGE (ML) INJECTION AS NEEDED
Status: DISCONTINUED | OUTPATIENT
Start: 2024-09-27 | End: 2024-10-10 | Stop reason: HOSPADM

## 2024-09-27 RX ORDER — NICOTINE POLACRILEX 4 MG
15 LOZENGE BUCCAL
Status: DISCONTINUED | OUTPATIENT
Start: 2024-09-27 | End: 2024-10-10 | Stop reason: HOSPADM

## 2024-09-27 RX ORDER — SODIUM CHLORIDE 9 MG/ML
40 INJECTION, SOLUTION INTRAVENOUS AS NEEDED
Status: DISCONTINUED | OUTPATIENT
Start: 2024-09-27 | End: 2024-10-10 | Stop reason: HOSPADM

## 2024-09-27 RX ORDER — INSULIN LISPRO 100 [IU]/ML
7 INJECTION, SOLUTION INTRAVENOUS; SUBCUTANEOUS
Status: DISCONTINUED | OUTPATIENT
Start: 2024-09-28 | End: 2024-10-10 | Stop reason: HOSPADM

## 2024-09-27 RX ORDER — ACETAMINOPHEN 650 MG/1
650 SUPPOSITORY RECTAL EVERY 4 HOURS PRN
Status: DISCONTINUED | OUTPATIENT
Start: 2024-09-27 | End: 2024-10-10 | Stop reason: HOSPADM

## 2024-09-27 RX ORDER — INSULIN LISPRO 100 [IU]/ML
2-7 INJECTION, SOLUTION INTRAVENOUS; SUBCUTANEOUS
Status: DISCONTINUED | OUTPATIENT
Start: 2024-09-27 | End: 2024-10-10 | Stop reason: HOSPADM

## 2024-09-27 RX ORDER — IBUPROFEN 600 MG/1
1 TABLET ORAL
Status: DISCONTINUED | OUTPATIENT
Start: 2024-09-27 | End: 2024-10-10 | Stop reason: HOSPADM

## 2024-09-27 RX ORDER — DEXTROSE MONOHYDRATE 25 G/50ML
25 INJECTION, SOLUTION INTRAVENOUS
Status: DISCONTINUED | OUTPATIENT
Start: 2024-09-27 | End: 2024-10-10 | Stop reason: HOSPADM

## 2024-09-27 RX ORDER — BISACODYL 5 MG/1
5 TABLET, DELAYED RELEASE ORAL DAILY PRN
Status: DISCONTINUED | OUTPATIENT
Start: 2024-09-27 | End: 2024-10-10 | Stop reason: HOSPADM

## 2024-09-27 RX ORDER — ACETAMINOPHEN 325 MG/1
650 TABLET ORAL EVERY 4 HOURS PRN
Status: DISCONTINUED | OUTPATIENT
Start: 2024-09-27 | End: 2024-10-10 | Stop reason: HOSPADM

## 2024-09-27 RX ORDER — SODIUM CHLORIDE 9 MG/ML
250 INJECTION, SOLUTION INTRAVENOUS AS NEEDED
Status: DISCONTINUED | OUTPATIENT
Start: 2024-09-27 | End: 2024-09-29 | Stop reason: HOSPADM

## 2024-09-27 RX ORDER — AMOXICILLIN 250 MG
2 CAPSULE ORAL 2 TIMES DAILY PRN
Status: DISCONTINUED | OUTPATIENT
Start: 2024-09-27 | End: 2024-10-10 | Stop reason: HOSPADM

## 2024-09-27 RX ORDER — SODIUM CHLORIDE 0.9 % (FLUSH) 0.9 %
10 SYRINGE (ML) INJECTION EVERY 12 HOURS SCHEDULED
Status: DISCONTINUED | OUTPATIENT
Start: 2024-09-27 | End: 2024-10-10 | Stop reason: HOSPADM

## 2024-09-27 RX ORDER — BISACODYL 10 MG
10 SUPPOSITORY, RECTAL RECTAL DAILY PRN
Status: DISCONTINUED | OUTPATIENT
Start: 2024-09-27 | End: 2024-10-10 | Stop reason: HOSPADM

## 2024-09-27 RX ORDER — ENOXAPARIN SODIUM 100 MG/ML
40 INJECTION SUBCUTANEOUS DAILY
Status: DISCONTINUED | OUTPATIENT
Start: 2024-09-27 | End: 2024-09-27

## 2024-09-27 RX ORDER — ACETAMINOPHEN 160 MG/5ML
650 SOLUTION ORAL EVERY 4 HOURS PRN
Status: DISCONTINUED | OUTPATIENT
Start: 2024-09-27 | End: 2024-10-10 | Stop reason: HOSPADM

## 2024-09-27 RX ORDER — POLYETHYLENE GLYCOL 3350 17 G/17G
17 POWDER, FOR SOLUTION ORAL DAILY PRN
Status: DISCONTINUED | OUTPATIENT
Start: 2024-09-27 | End: 2024-10-10 | Stop reason: HOSPADM

## 2024-09-27 RX ADMIN — INSULIN GLARGINE 28 UNITS: 100 INJECTION, SOLUTION SUBCUTANEOUS at 21:58

## 2024-09-27 RX ADMIN — CEFEPIME 2000 MG: 2 INJECTION, POWDER, FOR SOLUTION INTRAVENOUS at 18:29

## 2024-09-27 RX ADMIN — DOXYCYCLINE 100 MG: 100 INJECTION, POWDER, LYOPHILIZED, FOR SOLUTION INTRAVENOUS at 19:47

## 2024-09-27 RX ADMIN — Medication 10 ML: at 23:25
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
## 2024-09-27 NOTE — TELEPHONE ENCOUNTER
Hub staff attempted to follow warm transfer process and was unsuccessful     Caller: Nieves Mc A    Relationship to patient: Self    Best call back number: [phone]    Patient is needing: PATIENT IS GETTING HER TRANSFUSION AND HER OXYGEN LEVEL WAS A 60. THEY HAVE HER ON 3 LITERS OF OXYGEN RIGHT NOW AND ITS UP TO AROUND 92.      CALL TO ADVISE

## 2024-09-27 NOTE — LETTER
EMS Transport Request  For use at [facility], [place], [place], [place], and [place] only   Patient Name: Nieves Mc : 1975   Weight:89.4 kg (197 lb) Pick-up Location: 201-1 BLS/ALS: BLS/ALS: BLS   Insurance: [employer] REPLACEMENT Auth End Date: 10/10/2024   Pre-Cert #: D/C Summary complete:    Destination: Other [address]    Contact Precautions: Other contact   Equipment (O2, Fluids, etc.): O2, settings 5L NC   Arrive By Date/Time: 10/10/2024 1200 Stretcher/WC: Stretcher   CM Requesting: Elsa Lawson RN Ext: [phone]   Notes/Medical Necessity: 197lbs, 5L NC, A&O, standby transfer, hospital to hospital transfer for high dose chemo administration     ______________________________________________________________________    *Only 2 patient bags OR 1 carry-on size bag are permitted.  Wheelchairs and walkers CANNOT transported with the patient. Acknowledge: Yes

## 2024-09-27 NOTE — H&P
"[facility]  History & Physical    Patient Name: Nieves Mc  : 1975  MRN: [id number]  Primary Care Physician:  Pankaj Stover MD  Date of admission: 2024  Date and Time of Service: 2024 at 7:30 pm    Subjective      Chief Complaint: \"shortness of breath\"    History of Present Illness: Nieves Mc is a 48 y.o. female with a PMH of CML on ponatinib, chronic anemia, leukopenia on bilateral subdural hematoma, systolic CHF, Diabetes type 1, PE on xarelto, anxiety who presented to [facility] on 2024 with shhypoxia. Patient was evaluated earlier at cancer center for a blood transfusion, was then found to be hypoxic SPO2 76 %. Patient was subsequently sent to ED for evaluation. Denies chest pain, fever chills, hemoptysis. She also denies SOB. Reports bilateral LE swelling.  Evaluation in ED patient saturating 86 %, requiring 3 L NC to maintain SPO2 > 90 %. Tachycardic in mid 110s, afebrile, slightly hypertensive. CXR done showed patchy right greater than left airspace opacities. CBC labs notable for neutropenia, Hb 7.7. Chemistry labs T bili 1.8, creat 1.29, BUN 34. HS trop marginally elevated 17, proBNP 4.257. The decision to admit was made.       Review of Systems   Constitutional:  Positive for fatigue. Negative for fever.   HENT:  Positive for congestion.    Respiratory:  Positive for cough and shortness of breath. Negative for chest tightness.    Cardiovascular:  Negative for chest pain.   Gastrointestinal:  Negative for abdominal pain.   Genitourinary:  Negative for dysuria.   Psychiatric/Behavioral:  Negative for confusion.        Personal History     Past Medical History:   Diagnosis Date    Acute respiratory failure with hypoxia 2022    Adverse effect of chemotherapy 2023    Bilateral subdural hematomas 2023    Bone pain     Chronic constipation 2023    CML (chronic myelocytic leukemia) 2018    COVID-19 virus " infection 2022    Diabetes mellitus     Diabetic gastroparesis 2023    Extremity pain     Carlin. legs pain    Fall during current hospitalization 2023    Leg pain     left leg greater    Medically noncompliant 2021    Migraine     Petechial rash 2023    Pubic ramus fracture, left, closed, initial encounter 2023    Pulmonary embolism     Traumatic subdural hemorrhage without loss of consciousness 2023    Tumor lysis syndrome 2022    UTI (urinary tract infection) 2023    Vision loss     doing surgery       Past Surgical History:   Procedure Laterality Date    BONE MARROW BIOPSY      BREAST SURGERY      BRONCHOSCOPY N/A 2022    Procedure: BRONCHOSCOPY bilateral lung washing;  Surgeon: Charlene Camara MD;  Location: [facility] ENDOSCOPY;  Service: Pulmonary;  Laterality: N/A;  post: rule out infection vs transfusion lung injury     SECTION      CHOLECYSTECTOMY      COLONOSCOPY N/A 2023    Procedure: COLONOSCOPY;  Surgeon: Freddy Villeda MD;  Location: [facility] ENDOSCOPY;  Service: Gastroenterology;  Laterality: N/A;  poor prep    ENDOSCOPY N/A 2023    Procedure: ESOPHAGOGASTRODUODENOSCOPY with biopsy x2 areas;  Surgeon: Freddy Villeda MD;  Location: [facility] ENDOSCOPY;  Service: Gastroenterology;  Laterality: N/A;  food in stomach;abnormal duodenal mucosa    EYE SURGERY      laser surgery due  to hemmorage--- 2021-- another surgery  lt eye11/15/21    RETINAL DETACHMENT SURGERY      SPINE SURGERY      Lombardi spinal block    TUBAL ABDOMINAL LIGATION         Family History: family history includes Diabetes in her maternal grandmother and mother; Heart attack in her maternal grandmother; Stroke in her maternal grandmother. Otherwise pertinent FHx was reviewed and not pertinent to current issue.    Social History:  reports that she has never smoked. She has never used smokeless tobacco. She reports that she does not drink alcohol and does not use  drugs.    Home Medications:  Prior to Admission Medications       Prescriptions Last Dose Informant Patient Reported? Taking?    acetaminophen (TYLENOL) 325 MG tablet   No No    Take 2 tablets by mouth Every 4 (Four) Hours As Needed for Moderate Pain. Indications: Fever, Pain    ALPRAZolam (Xanax) 0.5 MG tablet   No No    Take 1 tablet by mouth At Night As Needed for Anxiety. Indications: Feeling Anxious    Continuous Glucose Sensor (FreeStyle Zahira 2 Sensor) misc   No No    Use 1 each 4 (Four) Times a Day Before Meals & at Bedtime. Dx code: E11.65    dapagliflozin Propanediol (Farxiga) 10 MG tablet   No No    Take 10 mg (1 tablet) by mouth Daily. Dx code: E11.65    Diclofenac Sodium (VOLTAREN) 1 % gel gel   No No    Apply 4 g topically to the appropriate area as directed 4 (Four) Times a Day As Needed (hip pain).    doxycycline (VIBRAMYICN) 100 MG tablet   No No    Take 1 tablet by mouth 2 (Two) Times a Day for 10 days.    furosemide (LASIX) 40 MG tablet   No No    Take 1 tablet by mouth Daily. Indications: Edema    gabapentin (Neurontin) 800 MG tablet   No No    Take 1 tablet by mouth 3 (Three) Times a Day. Ordered through [facility]  Indications: Diabetes with Nerve Disease    Insulin Glargine (LANTUS SOLOSTAR) 100 UNIT/ML injection pen   No No    Inject 28 Units under the skin into the appropriate area as directed Every Night. Indications: Type 2 Diabetes    Insulin Lispro, 1 Unit Dial, (HumaLOG KwikPen) 100 UNIT/ML solution pen-injector   No No    Inject 7 Units under the skin into the appropriate area as directed 3 (Three) Times a Day Before Meals. Dx code: E11.65    levoFLOXacin (Levaquin) 500 MG tablet   No No    Take 1 tablet by mouth Daily.    losartan (COZAAR) 25 MG tablet   No No    Take 1 tablet by mouth Daily for 30 days.    metoclopramide (Reglan) 10 MG tablet   No No    Take 1 tablet by mouth 4 (Four) Times a Day. Indications: Hiccups that are Hard to Cure, Nausea and Vomiting    metoprolol  succinate XL (TOPROL-XL) 25 MG 24 hr tablet   No No    Take 1 tablet by mouth Every 12 (Twelve) Hours for 30 days.    mirtazapine (REMERON) 30 MG tablet   No No    Take 1 tablet by mouth every night at bedtime. Indications: Major Depressive Disorder    ondansetron ODT (ZOFRAN-ODT) 4 MG disintegrating tablet   No No    Place 1 tablet on the tongue Every 8 (Eight) Hours As Needed for Nausea or Vomiting.    pain patient supplied pump   Yes No    by Intrathecal route Continuous.    PONATinib HCl (Iclusig) 10 MG tablet   No No    Take 10 mg by mouth Every Other Day. Take with or without food.  Swallow whole, do not crush or chew.    Patient not taking:  Reported on 9/23/2024    rivaroxaban (Xarelto) 10 MG tablet   No No    Take 1 tablet by mouth Daily.    tiZANidine (ZANAFLEX) 4 MG tablet   No No    Take 1 tablet by mouth Daily. Indications: Musculoskeletal Pain              Allergies:  No Known Allergies    Objective      Vitals:   Temp:  [97.4 °F (36.3 °C)-98.2 °F (36.8 °C)] 97.5 °F (36.4 °C)  Heart Rate:  [107-119] 111  Resp:  [18-27] 18  BP: (135-172)/(78-93) 148/93  Body mass index is 25.86 kg/m².  Physical Exam  Constitutional:       General: She is not in acute distress.     Appearance: Normal appearance. She is ill-appearing.   HENT:      Head: Normocephalic.      Mouth/Throat:      Mouth: Mucous membranes are dry.      Pharynx: No oropharyngeal exudate.   Eyes:      Extraocular Movements: Extraocular movements intact.      Pupils: Pupils are equal, round, and reactive to light.   Cardiovascular:      Rate and Rhythm: Regular rhythm. Tachycardia present.      Pulses: Normal pulses.      Heart sounds: Normal heart sounds.   Pulmonary:      Effort: Pulmonary effort is normal.      Breath sounds: Rales present. No wheezing or rhonchi.   Abdominal:      General: Abdomen is flat.      Palpations: Abdomen is soft.   Musculoskeletal:      Cervical back: Neck supple.   Skin:     General: Skin is dry.      Capillary  Refill: Capillary refill takes less than 2 seconds.      Coloration: Skin is pale.   Neurological:      General: No focal deficit present.      Mental Status: She is alert and oriented to person, place, and time.   Psychiatric:         Behavior: Behavior normal.         Diagnostic Data:  Lab Results (last 24 hours)       Procedure Component Value Units Date/Time    Respiratory Panel PCR w/COVID-19(SARS-CoV-2) ZEYAD/EUGENIA/MARVIN/PAD/COR/CARRIE In-House, NP Swab in UTM/VTM, 2 HR TAT - Swab, Nasopharynx [[id number]]  (Normal) Collected: 09/27/24 1728    Specimen: Swab from Nasopharynx Updated: 09/27/24 1830     ADENOVIRUS, PCR Not Detected     Coronavirus 229E Not Detected     Coronavirus HKU1 Not Detected     Coronavirus NL63 Not Detected     Coronavirus OC43 Not Detected     COVID19 Not Detected     Human Metapneumovirus Not Detected     Human Rhinovirus/Enterovirus Not Detected     Influenza A PCR Not Detected     Influenza B PCR Not Detected     Parainfluenza Virus 1 Not Detected     Parainfluenza Virus 2 Not Detected     Parainfluenza Virus 3 Not Detected     Parainfluenza Virus 4 Not Detected     RSV, PCR Not Detected     Bordetella pertussis pcr Not Detected     Bordetella parapertussis PCR Not Detected     Chlamydophila pneumoniae PCR Not Detected     Mycoplasma pneumo by PCR Not Detected    Narrative:      In the setting of a positive respiratory panel with a viral infection PLUS a negative procalcitonin without other underlying concern for bacterial infection, consider observing off antibiotics or discontinuation of antibiotics and continue supportive care. If the respiratory panel is positive for atypical bacterial infection (Bordetella pertussis, Chlamydophila pneumoniae, or Mycoplasma pneumoniae), consider antibiotic de-escalation to target atypical bacterial infection.    CBC & Differential [[id number]]  (Abnormal) Collected: 09/27/24 1724    Specimen: Blood Updated: 09/27/24 1824    Narrative:      The following  orders were created for panel order CBC & Differential.  Procedure                               Abnormality         Status                     ---------                               -----------         ------                     CBC Auto Differential[[id number]]        Abnormal            Final result               Scan Slide[[id number]]                                       Final result                 Please view results for these tests on the individual orders.    CBC Auto Differential [[id number]]  (Abnormal) Collected: 09/27/24 1724    Specimen: Blood Updated: 09/27/24 1824     WBC 3.94 10*3/mm3      RBC 2.68 10*6/mm3      Hemoglobin 7.7 g/dL      Hematocrit 24.5 %      MCV 91.4 fL      MCH 28.7 pg      MCHC 31.4 g/dL      RDW 18.0 %      RDW-SD 59.2 fl      MPV 9.7 fL      Platelets 448 10*3/mm3     Scan Slide [[id number]] Collected: 09/27/24 1724    Specimen: Blood Updated: 09/27/24 1824     Scan Slide --     Comment: See Manual Differential Results       Manual Differential [[id number]]  (Abnormal) Collected: 09/27/24 1724    Specimen: Blood Updated: 09/27/24 1824     Neutrophil % 13.0 %      Lymphocyte % 35.0 %      Monocyte % 5.0 %      Eosinophil % 1.0 %      Basophil % 6.0 %      Bands %  1.0 %      Metamyelocyte % 3.0 %      Blasts % 36.0 %      Neutrophils Absolute 0.55 10*3/mm3      Lymphocytes Absolute 1.38 10*3/mm3      Monocytes Absolute 0.20 10*3/mm3      Eosinophils Absolute 0.04 10*3/mm3      Basophils Absolute 0.24 10*3/mm3      nRBC 2.0 /100 WBC      RBC Morphology Normal     WBC Morphology Normal     Platelet Morphology Normal    Blood Culture - Blood, Arm, Left [[id number]] Collected: 09/27/24 1815    Specimen: Blood from Arm, Left Updated: 09/27/24 1821    Comprehensive Metabolic Panel [[id number]]  (Abnormal) Collected: 09/27/24 1724    Specimen: Blood Updated: 09/27/24 1756     Glucose 122 mg/dL      BUN 34 mg/dL      Creatinine 1.29 mg/dL      Sodium 139 mmol/L      Potassium 4.8 mmol/L       Chloride 104 mmol/L      CO2 22.9 mmol/L      Calcium 9.0 mg/dL      Total Protein 7.9 g/dL      Albumin 4.0 g/dL      ALT (SGPT) 24 U/L      AST (SGOT) 28 U/L      Alkaline Phosphatase 317 U/L      Total Bilirubin 1.8 mg/dL      Globulin 3.9 gm/dL      A/G Ratio 1.0 g/dL      BUN/Creatinine Ratio 26.4     Anion Gap 12.1 mmol/L      eGFR 51.3 mL/min/1.73     Narrative:      GFR Normal >60  Chronic Kidney Disease <60  Kidney Failure <15      Single High Sensitivity Troponin T [[id number]]  (Abnormal) Collected: 09/27/24 1724    Specimen: Blood Updated: 09/27/24 1756     HS Troponin T 17 ng/L     Narrative:      High Sensitive Troponin T Reference Range:  <14.0 ng/L- Negative Female for AMI  <22.0 ng/L- Negative Male for AMI  >=14 - Abnormal Female indicating possible myocardial injury.  >=22 - Abnormal Male indicating possible myocardial injury.   Clinicians would have to utilize clinical acumen, EKG, Troponin, and serial changes to determine if it is an Acute Myocardial Infarction or myocardial injury due to an underlying chronic condition.         BNP [[id number]]  (Abnormal) Collected: 09/27/24 1724    Specimen: Blood Updated: 09/27/24 1756     proBNP 4,257.0 pg/mL     Narrative:      This assay is used as an aid in the diagnosis of individuals suspected of having heart failure. It can be used as an aid in the diagnosis of acute decompensated heart failure (ADHF) in patients presenting with signs and symptoms of ADHF to the emergency department (ED). In addition, NT-proBNP of <300 pg/mL indicates ADHF is not likely.    Age Range Result Interpretation  NT-proBNP Concentration (pg/mL:      <50             Positive            >450                   Gray                 300-450                    Negative             <300    50-75           Positive            >900                  Gray                300-900                  Negative            <300      >75             Positive            >1800                  Slaughter                 300-1800                  Negative            <300    Protime-INR [[id number]]  (Normal) Collected: 09/27/24 1724    Specimen: Blood Updated: 09/27/24 1744     Protime 11.5 Seconds      INR 1.06    aPTT [[id number]]  (Abnormal) Collected: 09/27/24 1724    Specimen: Blood Updated: 09/27/24 1744     PTT 34.6 seconds     POC Lactate [[id number]]  (Normal) Collected: 09/27/24 1732    Specimen: Blood Updated: 09/27/24 1734     Lactate 0.5 mmol/L      Comment: Serial Number: [id number]:  [id number]       Blood Culture - Blood, Arm, Left [[id number]] Collected: 09/27/24 1724    Specimen: Blood from Arm, Left Updated: 09/27/24 1727             Imaging Results (Last 24 Hours)       Procedure Component Value Units Date/Time    XR Chest 1 View [[id number]] Collected: 09/27/24 1720     Updated: 09/27/24 1723    Narrative:      XR CHEST 1 VW    Date of Exam: 9/27/2024 5:16 PM EDT    Indication: shortness of breath    Comparison: 3/19/2024    Findings:  The cardiac silhouette is enlarged and partially obscured. Elevated right hemidiaphragm. Low lung volumes with patchy right greater than left airspace opacities. No pleural effusion or pneumothorax. No acute osseous abnormality.      Impression:      Impression:  Patchy right greater than left airspace opacities which may represent infection. Enlarged cardiac silhouette.      Electronically Signed: Herbert Hutchinson MD    9/27/2024 5:21 PM EDT    Workstation ID: [id number]              Assessment & Plan        This is a 48 y.o. female with:    Active and Resolved Problems  Active Hospital Problems    Diagnosis  POA    **Acute hypoxemic respiratory failure [J96.01]  Yes      Resolved Hospital Problems   No resolved problems to display.       Acute hypoxemic respiratory failure due to Community Acquired Pneumonia and acute on chronic diastolic CHF  Patient requiring 3 L NC oxygen supplementation to maintain SPO2 > 90 %  CXR showed bilateral airspace opacities R>L  Started on  cefepime and doxycycline  Follow blood and sputum culture, atypical workup  Pro BNP 4252  Start lasix 20 mg IV bid  TTE on 3/21/24 showed LVEF > 62 %, was 44 % on 3/10/24  Wean oxygen as tolerated    Type 1 DM  Continue insulin lantus 28 units HS  Continue lispro 7 units before meals plus SS    CML  Chronic anemia  Neutropenia  Completed blood transfusion prior to coming to ED  Continue Ponatinib  Transfuse blood products as needed    History of anxiety  Continue Remeron   Continue Xanax          VTE Prophylaxis:  Pharmacologic VTE prophylaxis orders are present.        The patient desires to be as follows:    CODE STATUS:       Full code    Deny Mc, who can be contacted at [phone], is the designated person to make medical decisions on the patient's behalf if She is incapable of doing so. This was clarified with patient and/or next of kin on 9/27/2024 during the course of this H&P.    Admission Status:  I believe this patient meets inpatient status.    Expected Length of Stay: 2-3    PDMP and Medication Dispenses via Sidebar reviewed and consistent with patient reported medications.    I discussed the patient's findings and my recommendations with patient, nursing staff, and consulting provider.      Signature:     This document has been electronically signed by Carlos Llanes Alvarez, MD on September 27, 2024 19:45 EDT   [facility] Hospitalist Team

## 2024-09-27 NOTE — PROGRESS NOTES
Pt here for 1 unit PRBCs. PICC team came over to place IV. When getting vitals prior to picking up pts unit of blood. Pt sitting up in chair O2 sat 76% on room air. She states that when she saw Dr. Lerma yesterday in her office they had to place her on O2. Pt denies any SOA, CP or any pain at all. Pt denies any fevers. Placed pt on 2L/NC pt sat came up to 88%. After being on 2L/NC for about 3 minutes and not coming up above 88% bumped pt up to 3L/NC. Pt O2 stat then increased and has stayed above 92%. Called Dr. Lerma's office spoke with CAMILA Mendez (Dr. Lerma nurse). She stated per Dr. Lerma she would like pt to go to the ER because last time this happened pt had pneumonia and she is concerned. Informed pt of this and she and her spouse are discussing if she is going to go down to the ER. Pt states that she discussed this with her  and she isn't going to go to the ER per Dr. Lerma recommendation. She states she will come back over the weekend if she starts to feel bad. At end of blood transfusion CAMILA Valle took ending vitals and took pt off of O2. Pt O2 sat dropped to 85%. Pt was encouraged to go to ER. Pt was agreeable to be taken to ER. Wheeled pt to ER on 3L/NC.

## 2024-09-27 NOTE — TELEPHONE ENCOUNTER
Multiple conversations with [facility] occurred since time of pt's phone call. Pt was advised by [facility] nurse to go to the ER, pt refused. I spoke to Dr. Lerma to see if we should attempt to set up home oxygen urgently. Dr. Lerma stated that the pt has to go to the ER as she may have pneumonia. Vidya at [facility] notified, she was able to discuss with the pt and pt was eventually agreeable to go to the ER.

## 2024-09-27 NOTE — Clinical Note
Level of Care: Telemetry [5]   Admitting Physician: LLANES ALVAREZ, CARLOS [[id number]]   Attending Physician: LLANES ALVAREZ, CARLOS [[id number]]

## 2024-09-28 LAB
ALBUMIN SERPL-MCNC: 3.9 G/DL (ref 3.5–5.2)
ALBUMIN/GLOB SERPL: 0.9 G/DL
ALP SERPL-CCNC: 326 U/L (ref 39–117)
ALT SERPL W P-5'-P-CCNC: 24 U/L (ref 1–33)
ANION GAP SERPL CALCULATED.3IONS-SCNC: 11 MMOL/L (ref 5–15)
ANISOCYTOSIS BLD QL: ABNORMAL
AST SERPL-CCNC: 30 U/L (ref 1–32)
BASOPHILS # BLD MANUAL: 0.17 10*3/MM3 (ref 0–0.2)
BASOPHILS NFR BLD MANUAL: 4 % (ref 0–1.5)
BH BB BLOOD EXPIRATION DATE: NORMAL
BH BB BLOOD TYPE BARCODE: 6200
BH BB DISPENSE STATUS: NORMAL
BH BB PRODUCT CODE: NORMAL
BH BB UNIT NUMBER: NORMAL
BILIRUB SERPL-MCNC: 1.3 MG/DL (ref 0–1.2)
BLASTS NFR BLD MANUAL: 32 % (ref 0–0)
BUN SERPL-MCNC: 31 MG/DL (ref 6–20)
BUN/CREAT SERPL: 27.4 (ref 7–25)
CALCIUM SPEC-SCNC: 9.1 MG/DL (ref 8.6–10.5)
CHLORIDE SERPL-SCNC: 106 MMOL/L (ref 98–107)
CO2 SERPL-SCNC: 23 MMOL/L (ref 22–29)
CREAT SERPL-MCNC: 1.13 MG/DL (ref 0.57–1)
CROSSMATCH INTERPRETATION: NORMAL
DEPRECATED RDW RBC AUTO: 63.3 FL (ref 37–54)
EGFRCR SERPLBLD CKD-EPI 2021: 60.1 ML/MIN/1.73
ERYTHROCYTE [DISTWIDTH] IN BLOOD BY AUTOMATED COUNT: 18.8 % (ref 12.3–15.4)
FERRITIN SERPL-MCNC: 1453 NG/ML (ref 13–150)
GEN 5 2HR TROPONIN T REFLEX: 17 NG/L
GLOBULIN UR ELPH-MCNC: 4.2 GM/DL
GLUCOSE BLDC GLUCOMTR-MCNC: 105 MG/DL (ref 70–105)
GLUCOSE BLDC GLUCOMTR-MCNC: 107 MG/DL (ref 70–105)
GLUCOSE BLDC GLUCOMTR-MCNC: 86 MG/DL (ref 70–105)
GLUCOSE BLDC GLUCOMTR-MCNC: 89 MG/DL (ref 70–105)
GLUCOSE SERPL-MCNC: 91 MG/DL (ref 65–99)
HCT VFR BLD AUTO: 24.8 % (ref 34–46.6)
HGB BLD-MCNC: 7.5 G/DL (ref 12–15.9)
L PNEUMO1 AG UR QL IA: NEGATIVE
LYMPHOCYTES # BLD MANUAL: 1.42 10*3/MM3 (ref 0.7–3.1)
LYMPHOCYTES NFR BLD MANUAL: 6 % (ref 5–12)
MAGNESIUM SERPL-MCNC: 2.4 MG/DL (ref 1.6–2.6)
MCH RBC QN AUTO: 28.2 PG (ref 26.6–33)
MCHC RBC AUTO-ENTMCNC: 30.2 G/DL (ref 31.5–35.7)
MCV RBC AUTO: 93.2 FL (ref 79–97)
METAMYELOCYTES NFR BLD MANUAL: 2 % (ref 0–0)
MONOCYTES # BLD: 0.26 10*3/MM3 (ref 0.1–0.9)
MYELOCYTES NFR BLD MANUAL: 3 % (ref 0–0)
NEUTROPHILS # BLD AUTO: 0.77 10*3/MM3 (ref 1.7–7)
NEUTROPHILS NFR BLD MANUAL: 16 % (ref 42.7–76)
NEUTS BAND NFR BLD MANUAL: 2 % (ref 0–5)
NRBC SPEC MANUAL: 2 /100 WBC (ref 0–0.2)
PHOSPHATE SERPL-MCNC: 4 MG/DL (ref 2.5–4.5)
PLAT MORPH BLD: NORMAL
PLATELET # BLD AUTO: 422 10*3/MM3 (ref 140–450)
PMV BLD AUTO: 9.9 FL (ref 6–12)
POTASSIUM SERPL-SCNC: 5.3 MMOL/L (ref 3.5–5.2)
PROMYELOCYTES NFR BLD MANUAL: 2 % (ref 0–0)
PROT SERPL-MCNC: 8.1 G/DL (ref 6–8.5)
RBC # BLD AUTO: 2.66 10*6/MM3 (ref 3.77–5.28)
S PNEUM AG SPEC QL LA: NEGATIVE
SCAN SLIDE: NORMAL
SODIUM SERPL-SCNC: 140 MMOL/L (ref 136–145)
TROPONIN T DELTA: -5 NG/L
TROPONIN T SERPL HS-MCNC: 22 NG/L
UNIT  ABO: NORMAL
UNIT  RH: NORMAL
VARIANT LYMPHS NFR BLD MANUAL: 33 % (ref 19.6–45.3)
WBC MORPH BLD: NORMAL
WBC NRBC COR # BLD AUTO: 4.3 10*3/MM3 (ref 3.4–10.8)

## 2024-09-28 PROCEDURE — 63710000001 INSULIN GLARGINE PER 5 UNITS: Performed by: STUDENT IN AN ORGANIZED HEALTH CARE EDUCATION/TRAINING PROGRAM

## 2024-09-28 PROCEDURE — 85025 COMPLETE CBC W/AUTO DIFF WBC: CPT | Performed by: STUDENT IN AN ORGANIZED HEALTH CARE EDUCATION/TRAINING PROGRAM

## 2024-09-28 PROCEDURE — 25010000002 FUROSEMIDE PER 20 MG: Performed by: STUDENT IN AN ORGANIZED HEALTH CARE EDUCATION/TRAINING PROGRAM

## 2024-09-28 PROCEDURE — 36415 COLL VENOUS BLD VENIPUNCTURE: CPT | Performed by: STUDENT IN AN ORGANIZED HEALTH CARE EDUCATION/TRAINING PROGRAM

## 2024-09-28 PROCEDURE — 80053 COMPREHEN METABOLIC PANEL: CPT | Performed by: STUDENT IN AN ORGANIZED HEALTH CARE EDUCATION/TRAINING PROGRAM

## 2024-09-28 PROCEDURE — 85007 BL SMEAR W/DIFF WBC COUNT: CPT | Performed by: STUDENT IN AN ORGANIZED HEALTH CARE EDUCATION/TRAINING PROGRAM

## 2024-09-28 PROCEDURE — 82948 REAGENT STRIP/BLOOD GLUCOSE: CPT

## 2024-09-28 PROCEDURE — 86738 MYCOPLASMA ANTIBODY: CPT | Performed by: STUDENT IN AN ORGANIZED HEALTH CARE EDUCATION/TRAINING PROGRAM

## 2024-09-28 PROCEDURE — 87899 AGENT NOS ASSAY W/OPTIC: CPT | Performed by: STUDENT IN AN ORGANIZED HEALTH CARE EDUCATION/TRAINING PROGRAM

## 2024-09-28 PROCEDURE — 83735 ASSAY OF MAGNESIUM: CPT | Performed by: STUDENT IN AN ORGANIZED HEALTH CARE EDUCATION/TRAINING PROGRAM

## 2024-09-28 PROCEDURE — 63710000001 INSULIN LISPRO (HUMAN) PER 5 UNITS: Performed by: STUDENT IN AN ORGANIZED HEALTH CARE EDUCATION/TRAINING PROGRAM

## 2024-09-28 PROCEDURE — 82948 REAGENT STRIP/BLOOD GLUCOSE: CPT | Performed by: STUDENT IN AN ORGANIZED HEALTH CARE EDUCATION/TRAINING PROGRAM

## 2024-09-28 PROCEDURE — 87449 NOS EACH ORGANISM AG IA: CPT | Performed by: STUDENT IN AN ORGANIZED HEALTH CARE EDUCATION/TRAINING PROGRAM

## 2024-09-28 PROCEDURE — 99222 1ST HOSP IP/OBS MODERATE 55: CPT | Performed by: INTERNAL MEDICINE

## 2024-09-28 PROCEDURE — 82728 ASSAY OF FERRITIN: CPT | Performed by: STUDENT IN AN ORGANIZED HEALTH CARE EDUCATION/TRAINING PROGRAM

## 2024-09-28 PROCEDURE — 25010000002 CEFEPIME PER 500 MG: Performed by: STUDENT IN AN ORGANIZED HEALTH CARE EDUCATION/TRAINING PROGRAM

## 2024-09-28 PROCEDURE — 84100 ASSAY OF PHOSPHORUS: CPT | Performed by: STUDENT IN AN ORGANIZED HEALTH CARE EDUCATION/TRAINING PROGRAM

## 2024-09-28 PROCEDURE — 84484 ASSAY OF TROPONIN QUANT: CPT | Performed by: STUDENT IN AN ORGANIZED HEALTH CARE EDUCATION/TRAINING PROGRAM

## 2024-09-28 RX ORDER — METOPROLOL SUCCINATE 25 MG/1
25 TABLET, EXTENDED RELEASE ORAL EVERY 12 HOURS SCHEDULED
Status: DISCONTINUED | OUTPATIENT
Start: 2024-09-28 | End: 2024-09-30

## 2024-09-28 RX ORDER — HYDRALAZINE HYDROCHLORIDE 20 MG/ML
20 INJECTION INTRAMUSCULAR; INTRAVENOUS EVERY 6 HOURS PRN
Status: DISCONTINUED | OUTPATIENT
Start: 2024-09-28 | End: 2024-10-10 | Stop reason: HOSPADM

## 2024-09-28 RX ORDER — LOSARTAN POTASSIUM 25 MG/1
25 TABLET ORAL
Status: DISCONTINUED | OUTPATIENT
Start: 2024-09-28 | End: 2024-09-30

## 2024-09-28 RX ORDER — FUROSEMIDE 10 MG/ML
20 INJECTION INTRAMUSCULAR; INTRAVENOUS 2 TIMES DAILY
Status: DISCONTINUED | OUTPATIENT
Start: 2024-09-28 | End: 2024-10-01

## 2024-09-28 RX ORDER — METOPROLOL SUCCINATE 25 MG/1
25 TABLET, EXTENDED RELEASE ORAL
Status: DISCONTINUED | OUTPATIENT
Start: 2024-09-28 | End: 2024-09-28

## 2024-09-28 RX ADMIN — ACETAMINOPHEN 650 MG: 325 TABLET, FILM COATED ORAL at 07:36

## 2024-09-28 RX ADMIN — METOPROLOL SUCCINATE 25 MG: 25 TABLET, EXTENDED RELEASE ORAL at 07:43

## 2024-09-28 RX ADMIN — METOPROLOL SUCCINATE 25 MG: 25 TABLET, EXTENDED RELEASE ORAL at 19:11

## 2024-09-28 RX ADMIN — LOSARTAN POTASSIUM 25 MG: 25 TABLET, FILM COATED ORAL at 07:43

## 2024-09-28 RX ADMIN — FUROSEMIDE 20 MG: 10 INJECTION, SOLUTION INTRAMUSCULAR; INTRAVENOUS at 17:34

## 2024-09-28 RX ADMIN — INSULIN LISPRO 7 UNITS: 100 INJECTION, SOLUTION INTRAVENOUS; SUBCUTANEOUS at 09:45

## 2024-09-28 RX ADMIN — INSULIN LISPRO 7 UNITS: 100 INJECTION, SOLUTION INTRAVENOUS; SUBCUTANEOUS at 12:36

## 2024-09-28 RX ADMIN — DOXYCYCLINE 100 MG: 100 INJECTION, POWDER, LYOPHILIZED, FOR SOLUTION INTRAVENOUS at 21:29

## 2024-09-28 RX ADMIN — Medication 10 ML: at 20:44

## 2024-09-28 RX ADMIN — ACETAMINOPHEN 650 MG: 325 TABLET, FILM COATED ORAL at 12:35

## 2024-09-28 RX ADMIN — INSULIN LISPRO 7 UNITS: 100 INJECTION, SOLUTION INTRAVENOUS; SUBCUTANEOUS at 17:52

## 2024-09-28 RX ADMIN — CEFEPIME 2000 MG: 2 INJECTION, POWDER, FOR SOLUTION INTRAVENOUS at 05:30

## 2024-09-28 RX ADMIN — FUROSEMIDE 20 MG: 10 INJECTION, SOLUTION INTRAMUSCULAR; INTRAVENOUS at 05:18

## 2024-09-28 RX ADMIN — INSULIN GLARGINE 28 UNITS: 100 INJECTION, SOLUTION SUBCUTANEOUS at 20:43

## 2024-09-28 RX ADMIN — CEFEPIME 2000 MG: 2 INJECTION, POWDER, FOR SOLUTION INTRAVENOUS at 17:34

## 2024-09-28 RX ADMIN — Medication 10 ML: at 09:36

## 2024-09-28 RX ADMIN — DOXYCYCLINE 100 MG: 100 INJECTION, POWDER, LYOPHILIZED, FOR SOLUTION INTRAVENOUS at 09:35

## 2024-09-28 NOTE — PLAN OF CARE
Problem: Adult Inpatient Plan of Care  Goal: Plan of Care Review  Flowsheets (Taken 9/28/2024 5890)  Plan of Care Reviewed With: patient   Goal Outcome Evaluation:  Plan of Care Reviewed With: patient      Pt new admit on the floor with acute hypoxemic respiratory failure, on 5L O2 via NC sating on the high 90s, on IV antibiotic, resting at his time, safety precautions maintained.

## 2024-09-28 NOTE — PLAN OF CARE
Goal Outcome Evaluation:                                              Problem: Adult Inpatient Plan of Care  Goal: Plan of Care Review  Outcome: Progressing  Goal: Patient-Specific Goal (Individualized)  Outcome: Progressing  Goal: Absence of Hospital-Acquired Illness or Injury  Outcome: Progressing  Intervention: Identify and Manage Fall Risk  Recent Flowsheet Documentation  Taken 9/28/2024 1625 by Vianey Ray LPN  Safety Promotion/Fall Prevention:   safety round/check completed   room organization consistent   nonskid shoes/slippers when out of bed   mobility aid in reach   lighting adjusted   fall prevention program maintained   gait belt   clutter free environment maintained   activity supervised   assistive device/personal items within reach  Intervention: Prevent Skin Injury  Recent Flowsheet Documentation  Taken 9/28/2024 1625 by Vianey Ray LPN  Body Position: sitting up in bed  Skin Protection: adhesive use limited  Intervention: Prevent and Manage VTE (Venous Thromboembolism) Risk  Recent Flowsheet Documentation  Taken 9/28/2024 1625 by Vianey Ray LPN  Activity Management: activity encouraged  Intervention: Prevent Infection  Recent Flowsheet Documentation  Taken 9/28/2024 1625 by Vianey Ray LPN  Infection Prevention:   single patient room provided   rest/sleep promoted   personal protective equipment utilized   cohorting utilized   environmental surveillance performed   equipment surfaces disinfected   hand hygiene promoted  Goal: Optimal Comfort and Wellbeing  Outcome: Progressing  Intervention: Provide Person-Centered Care  Recent Flowsheet Documentation  Taken 9/28/2024 1625 by Vianey Ray LPN  Trust Relationship/Rapport:   care explained   choices provided   emotional support provided   questions encouraged   thoughts/feelings acknowledged   reassurance provided   questions answered   empathic listening provided  Goal: Readiness for Transition of Care  Outcome: Progressing     Problem: Diabetes  Comorbidity  Goal: Blood Glucose Level Within Targeted Range  Outcome: Progressing  Intervention: Monitor and Manage Glycemia  Recent Flowsheet Documentation  Taken 9/28/2024 1625 by Vianey Ray LPN  Glycemic Management: blood glucose monitored     Problem: Heart Failure Comorbidity  Goal: Maintenance of Heart Failure Symptom Control  Outcome: Progressing  Intervention: Maintain Heart Failure-Management  Recent Flowsheet Documentation  Taken 9/28/2024 1625 by Vianey Ray LPN  Medication Review/Management: medications reviewed     Problem: Pain Chronic (Persistent) (Comorbidity Management)  Goal: Acceptable Pain Control and Functional Ability  Outcome: Progressing  Intervention: Manage Persistent Pain  Recent Flowsheet Documentation  Taken 9/28/2024 1625 by Vianey Ray LPN  Medication Review/Management: medications reviewed  Intervention: Optimize Psychosocial Wellbeing  Recent Flowsheet Documentation  Taken 9/28/2024 1625 by Vianey Ray LPN  Supportive Measures: active listening utilized  Diversional Activities:   television   smartphone  Family/Support System Care:   support provided   self-care encouraged     Problem: Fall Injury Risk  Goal: Absence of Fall and Fall-Related Injury  Outcome: Progressing  Intervention: Identify and Manage Contributors  Recent Flowsheet Documentation  Taken 9/28/2024 1625 by Vianey Ray LPN  Medication Review/Management: medications reviewed  Intervention: Promote Injury-Free Environment  Recent Flowsheet Documentation  Taken 9/28/2024 1625 by Vianey Ray LPN  Safety Promotion/Fall Prevention:   safety round/check completed   room organization consistent   nonskid shoes/slippers when out of bed   mobility aid in reach   lighting adjusted   fall prevention program maintained   gait belt   clutter free environment maintained   activity supervised   assistive device/personal items within reach     Problem: Adjustment to Illness (Sepsis/Septic Shock)  Goal: Optimal Coping  Outcome:  Progressing  Intervention: Optimize Psychosocial Adjustment to Illness  Recent Flowsheet Documentation  Taken 9/28/2024 1625 by Vianey Ray LPN  Supportive Measures: active listening utilized  Family/Support System Care:   support provided   self-care encouraged     Problem: Bleeding (Sepsis/Septic Shock)  Goal: Absence of Bleeding  Outcome: Progressing     Problem: Glycemic Control Impaired (Sepsis/Septic Shock)  Goal: Blood Glucose Level Within Desired Range  Outcome: Progressing  Intervention: Optimize Glycemic Control  Recent Flowsheet Documentation  Taken 9/28/2024 1625 by Vianey Ray LPN  Glycemic Management: blood glucose monitored     Problem: Infection Progression (Sepsis/Septic Shock)  Goal: Absence of Infection Signs and Symptoms  Outcome: Progressing  Intervention: Initiate Sepsis Management  Recent Flowsheet Documentation  Taken 9/28/2024 1625 by Vianey Ray LPN  Infection Prevention:   single patient room provided   rest/sleep promoted   personal protective equipment utilized   cohorting utilized   environmental surveillance performed   equipment surfaces disinfected   hand hygiene promoted  Isolation Precautions: precautions maintained  Intervention: Promote Recovery  Recent Flowsheet Documentation  Taken 9/28/2024 1625 by Vianey Ray LPN  Activity Management: activity encouraged     Problem: Nutrition Impaired (Sepsis/Septic Shock)  Goal: Optimal Nutrition Intake  Outcome: Progressing

## 2024-09-28 NOTE — PROGRESS NOTES
[facility] MEDICINE SERVICE  DAILY PROGRESS NOTE    NAME: Nieves Mc  : 1975  MRN: [id number]      LOS: 1 day     PROVIDER OF SERVICE: Aristeo Noel MD    Chief Complaint: Acute hypoxemic respiratory failure    Subjective:     Interval History:  History taken from: patient      History of Present Illness: Nieves Mc is a 48 y.o. female with a PMH of CML on ponatinib, chronic anemia, leukopenia on bilateral subdural hematoma, systolic CHF, Diabetes type 1, PE on xarelto, anxiety who presented to [facility] on 2024 with shhypoxia. Patient was evaluated earlier at cancer center for a blood transfusion, was then found to be hypoxic SPO2 76 %. Patient was subsequently sent to ED for evaluation. Denies chest pain, fever chills, hemoptysis. She also denies SOB. Reports bilateral LE swelling.  Evaluation in ED patient saturating 86 %, requiring 3 L NC to maintain SPO2 > 90 %. Tachycardic in mid 110s, afebrile, slightly hypertensive. CXR done showed patchy right greater than left airspace opacities. CBC labs notable for neutropenia, Hb 7.7. Chemistry labs T bili 1.8, creat 1.29, BUN 34. HS trop marginally elevated 17, proBNP 4.257. The decision to admit was made.      24 seen in bd NAD, C/O dyspnea, on 5lts oxygen    Review of Systems  Constitutional:  Positive for fatigue. Negative for fever.   HENT:  Positive for congestion.    Respiratory:  Positive for cough and shortness of breath. Negative for chest tightness.    Cardiovascular:  Negative for chest pain.   Gastrointestinal:  Negative for abdominal pain.   Genitourinary:  Negative for dysuria.   Psychiatric/Behavioral:  Negative for confusion.    Objective:     Vital Signs  Temp:  [97.4 °F (36.3 °C)-98.7 °F (37.1 °C)] 98.1 °F (36.7 °C)  Heart Rate:  [] 93  Resp:  [16-27] 16  BP: (109-172)/() 109/68  Flow (L/min):  [3-5] 5   Body mass index is 25.06 kg/m².    Physical Exam       General: She is not in acute  distress.     Appearance: Normal appearance. She is ill-appearing.   HENT:      Head: Normocephalic.      Mouth/Throat:      Mouth: Mucous membranes are dry.      Pharynx: No oropharyngeal exudate.   Eyes:      Extraocular Movements: Extraocular movements intact.      Pupils: Pupils are equal, round, and reactive to light.   Cardiovascular:      Rate and Rhythm: Regular rhythm. Tachycardia present.      Pulses: Normal pulses.      Heart sounds: Normal heart sounds.   Pulmonary:      Effort: Pulmonary effort is normal.      Breath sounds: Rales present. No wheezing or rhonchi.   Abdominal:      General: Abdomen is flat.      Palpations: Abdomen is soft.   Musculoskeletal:      Cervical back: Neck supple.   Skin:     General: Skin is dry.      Capillary Refill: Capillary refill takes less than 2 seconds.      Coloration: Skin is pale.   Neurological:      General: No focal deficit present.      Mental Status: She is alert and oriented to person, place, and time.   Psychiatric:         Behavior: Behavior normal.          Diagnostic Data    Results from last 7 days   Lab Units 09/28/24  0511   WBC 10*3/mm3 4.30   HEMOGLOBIN g/dL 7.5*   HEMATOCRIT % 24.8*   PLATELETS 10*3/mm3 422   GLUCOSE mg/dL 91   CREATININE mg/dL 1.13*   BUN mg/dL 31*   SODIUM mmol/L 140   POTASSIUM mmol/L 5.3*   AST (SGOT) U/L 30   ALT (SGPT) U/L 24   ALK PHOS U/L 326*   BILIRUBIN mg/dL 1.3*   ANION GAP mmol/L 11.0       XR Chest 1 View    Result Date: 9/27/2024  Impression: Patchy right greater than left airspace opacities which may represent infection. Enlarged cardiac silhouette. Electronically Signed: Herbert Hutchinson MD  9/27/2024 5:21 PM EDT  Workstation ID: [id number]       I reviewed the patient's new clinical results.    Assessment/Plan:     Active and Resolved Problems  Active Hospital Problems    Diagnosis  POA    **Acute hypoxemic respiratory failure [J96.01]  Yes      Resolved Hospital Problems   No resolved problems to display.     Acute  hypoxemic respiratory failure due to Community Acquired Pneumonia and acute on chronic diastolic CHF  Patient requiring 3 L NC oxygen supplementation to maintain SPO2 > 90 %  CXR showed bilateral airspace opacities R>L  Started on cefepime and doxycycline  Follow blood and sputum culture, atypical workup  Acute on chronic HFrEF (heart failure with reduced ejection fraction) secondary to NICM (nonischemic cardiomyopathy)  Bilateral lower extremity edema  Cardiology consulted  Previous EF 26-30% in Nov. 2022,  Repeat echocardiogram in March 2024 showed improvement in LV function with EF of 62%  Patient has been noncompliant with medications and outpatient follow up  High-sensitivity troponin of 17 and 22, proBNP of 4000  Previously proBNP was 12,000  Start Lasix  Monitor I's and O's and replace electrolytes as needed  Creatinine 1.13  Resume losartan, Toprol-XL and Jardiance     Pro BNP 4257  Start lasix 20 mg IV bid  TTE on 3/21/24 showed LVEF > 62 %, was 44 % on 3/10/24  Wean oxygen as tolerated     Type 1 DM  Continue insulin lantus 28 units HS  Continue lispro 7 units before meals plus SS     CML (chronic myelocytic leukemia)  On chemotherapy, followed by oncology   Chronic anemia  Neutropenia  Completed blood transfusion prior to coming to ED  Continue Ponatinib  Transfuse blood products as needed     History of anxiety  Continue Remeron   Continue Xanax    Sinus tachycardia-Due to underlying infection, anemia and hypoxia  Per cardiology Treat underlying causes  Increase Toprol-XL to twice daily    Medically noncompliant  Compliance encouraged  / consulted   HF Nurse Navigator consulted as above     VTE Prophylaxis:  No VTE prophylaxis order currently exists.       Disposition Planning:     Barriers to Discharge:dyspnea  Anticipated Date of Discharge: 9/30/24  Place of Discharge: home      Time: 35 minutes     Code Status and Medical Interventions: CPR (Attempt to Resuscitate); Full  Support   Ordered at: 09/28/24 0443     Code Status (Patient has no pulse and is not breathing):    CPR (Attempt to Resuscitate)     Medical Interventions (Patient has pulse or is breathing):    Full Support       Signature: Electronically signed by Aristeo Noel MD, 09/28/24, 11:02 EDT.  [facility] Hospitalist Team

## 2024-09-28 NOTE — CONSULTS
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 secondary to pneumonia, CHF, and anemia   Started on antibiotics  Management per primary     Anemia due to chemotherapy  H&H 7.5/24.8.  Transfuse as needed    CML (chronic myelocytic leukemia)  On chemotherapy, followed by oncology      Medically noncompliant  Compliance encouraged  / consulted   HF Nurse Navigator consulted as above     Type 2 diabetes mellitus with diabetic polyneuropathy, with long-term current use of insulin  A1c of 8.2  On Lantus and SSI     Sinus tachycardia  Due to underlying infection, anemia and hypoxia  Treat underlying causes  Increase Toprol-XL to twice daily    Rohan Jacksno MD  09/28/24  08:01 EDT

## 2024-09-29 PROBLEM — T45.1X5A ANEMIA DUE TO CHEMOTHERAPY: Chronic | Status: ACTIVE | Noted: 2024-03-19

## 2024-09-29 PROBLEM — I50.23 ACUTE ON CHRONIC HFREF (HEART FAILURE WITH REDUCED EJECTION FRACTION): Status: ACTIVE | Noted: 2024-09-27

## 2024-09-29 PROBLEM — D64.81 ANEMIA DUE TO CHEMOTHERAPY: Chronic | Status: ACTIVE | Noted: 2024-03-19

## 2024-09-29 PROBLEM — I50.33 ACUTE ON CHRONIC HEART FAILURE WITH PRESERVED EJECTION FRACTION (HFPEF): Status: RESOLVED | Noted: 2024-03-25 | Resolved: 2024-09-29

## 2024-09-29 LAB
ANION GAP SERPL CALCULATED.3IONS-SCNC: 12.4 MMOL/L (ref 5–15)
BACTERIA BLD CULT: ABNORMAL
BOTTLE TYPE: ABNORMAL
BUN SERPL-MCNC: 38 MG/DL (ref 6–20)
BUN/CREAT SERPL: 33.9 (ref 7–25)
CALCIUM SPEC-SCNC: 9 MG/DL (ref 8.6–10.5)
CHLORIDE SERPL-SCNC: 106 MMOL/L (ref 98–107)
CO2 SERPL-SCNC: 22.6 MMOL/L (ref 22–29)
CREAT SERPL-MCNC: 1.12 MG/DL (ref 0.57–1)
EGFRCR SERPLBLD CKD-EPI 2021: 60.8 ML/MIN/1.73
GLUCOSE BLDC GLUCOMTR-MCNC: 109 MG/DL (ref 70–105)
GLUCOSE BLDC GLUCOMTR-MCNC: 115 MG/DL (ref 70–105)
GLUCOSE BLDC GLUCOMTR-MCNC: 149 MG/DL (ref 70–105)
GLUCOSE BLDC GLUCOMTR-MCNC: 81 MG/DL (ref 70–105)
GLUCOSE SERPL-MCNC: 162 MG/DL (ref 65–99)
POTASSIUM SERPL-SCNC: 4.8 MMOL/L (ref 3.5–5.2)
SODIUM SERPL-SCNC: 141 MMOL/L (ref 136–145)

## 2024-09-29 PROCEDURE — 25010000002 ONDANSETRON PER 1 MG: Performed by: NURSE PRACTITIONER

## 2024-09-29 PROCEDURE — 80048 BASIC METABOLIC PNL TOTAL CA: CPT | Performed by: INTERNAL MEDICINE

## 2024-09-29 PROCEDURE — 25010000002 FUROSEMIDE PER 20 MG: Performed by: STUDENT IN AN ORGANIZED HEALTH CARE EDUCATION/TRAINING PROGRAM

## 2024-09-29 PROCEDURE — 82948 REAGENT STRIP/BLOOD GLUCOSE: CPT

## 2024-09-29 PROCEDURE — 63710000001 INSULIN LISPRO (HUMAN) PER 5 UNITS: Performed by: STUDENT IN AN ORGANIZED HEALTH CARE EDUCATION/TRAINING PROGRAM

## 2024-09-29 PROCEDURE — 99233 SBSQ HOSP IP/OBS HIGH 50: CPT | Performed by: INTERNAL MEDICINE

## 2024-09-29 PROCEDURE — 25010000002 CEFEPIME PER 500 MG: Performed by: STUDENT IN AN ORGANIZED HEALTH CARE EDUCATION/TRAINING PROGRAM

## 2024-09-29 PROCEDURE — 82948 REAGENT STRIP/BLOOD GLUCOSE: CPT | Performed by: STUDENT IN AN ORGANIZED HEALTH CARE EDUCATION/TRAINING PROGRAM

## 2024-09-29 PROCEDURE — 97162 PT EVAL MOD COMPLEX 30 MIN: CPT

## 2024-09-29 RX ORDER — ONDANSETRON 2 MG/ML
4 INJECTION INTRAMUSCULAR; INTRAVENOUS EVERY 6 HOURS PRN
Status: DISCONTINUED | OUTPATIENT
Start: 2024-09-29 | End: 2024-10-10 | Stop reason: HOSPADM

## 2024-09-29 RX ORDER — GABAPENTIN 400 MG/1
800 CAPSULE ORAL EVERY 8 HOURS SCHEDULED
Status: DISCONTINUED | OUTPATIENT
Start: 2024-09-29 | End: 2024-10-10 | Stop reason: HOSPADM

## 2024-09-29 RX ORDER — ALPRAZOLAM 0.5 MG
0.5 TABLET ORAL NIGHTLY PRN
Status: DISCONTINUED | OUTPATIENT
Start: 2024-09-28 | End: 2024-10-10 | Stop reason: HOSPADM

## 2024-09-29 RX ORDER — MIRTAZAPINE 15 MG/1
15 TABLET, FILM COATED ORAL NIGHTLY
Status: DISCONTINUED | OUTPATIENT
Start: 2024-09-29 | End: 2024-10-10 | Stop reason: HOSPADM

## 2024-09-29 RX ADMIN — ACETAMINOPHEN 650 MG: 325 TABLET, FILM COATED ORAL at 17:29

## 2024-09-29 RX ADMIN — DOXYCYCLINE 100 MG: 100 INJECTION, POWDER, LYOPHILIZED, FOR SOLUTION INTRAVENOUS at 20:29

## 2024-09-29 RX ADMIN — MIRTAZAPINE 15 MG: 15 TABLET, FILM COATED ORAL at 00:11

## 2024-09-29 RX ADMIN — FUROSEMIDE 20 MG: 10 INJECTION, SOLUTION INTRAMUSCULAR; INTRAVENOUS at 05:55

## 2024-09-29 RX ADMIN — FUROSEMIDE 20 MG: 10 INJECTION, SOLUTION INTRAMUSCULAR; INTRAVENOUS at 17:13

## 2024-09-29 RX ADMIN — CEFEPIME 2000 MG: 2 INJECTION, POWDER, FOR SOLUTION INTRAVENOUS at 05:55

## 2024-09-29 RX ADMIN — GABAPENTIN 800 MG: 400 CAPSULE ORAL at 13:06

## 2024-09-29 RX ADMIN — INSULIN LISPRO 7 UNITS: 100 INJECTION, SOLUTION INTRAVENOUS; SUBCUTANEOUS at 13:06

## 2024-09-29 RX ADMIN — Medication 10 ML: at 20:29

## 2024-09-29 RX ADMIN — ALPRAZOLAM 0.5 MG: 0.5 TABLET ORAL at 22:39

## 2024-09-29 RX ADMIN — METOPROLOL SUCCINATE 25 MG: 25 TABLET, EXTENDED RELEASE ORAL at 08:33

## 2024-09-29 RX ADMIN — GABAPENTIN 800 MG: 400 CAPSULE ORAL at 05:55

## 2024-09-29 RX ADMIN — RIVAROXABAN 10 MG: 20 TABLET, FILM COATED ORAL at 08:33

## 2024-09-29 RX ADMIN — ACETAMINOPHEN 650 MG: 325 TABLET, FILM COATED ORAL at 08:33

## 2024-09-29 RX ADMIN — GABAPENTIN 800 MG: 400 CAPSULE ORAL at 22:39

## 2024-09-29 RX ADMIN — METOPROLOL SUCCINATE 25 MG: 25 TABLET, EXTENDED RELEASE ORAL at 20:29

## 2024-09-29 RX ADMIN — ONDANSETRON 4 MG: 2 INJECTION, SOLUTION INTRAMUSCULAR; INTRAVENOUS at 23:31

## 2024-09-29 RX ADMIN — ALPRAZOLAM 0.5 MG: 0.5 TABLET ORAL at 00:11

## 2024-09-29 RX ADMIN — INSULIN LISPRO 7 UNITS: 100 INJECTION, SOLUTION INTRAVENOUS; SUBCUTANEOUS at 08:34

## 2024-09-29 RX ADMIN — Medication 10 ML: at 08:36

## 2024-09-29 RX ADMIN — TIZANIDINE 4 MG: 4 TABLET ORAL at 08:33

## 2024-09-29 RX ADMIN — MIRTAZAPINE 15 MG: 15 TABLET, FILM COATED ORAL at 20:29

## 2024-09-29 RX ADMIN — LOSARTAN POTASSIUM 25 MG: 25 TABLET, FILM COATED ORAL at 08:33

## 2024-09-29 RX ADMIN — ACETAMINOPHEN 650 MG: 325 TABLET, FILM COATED ORAL at 00:11

## 2024-09-29 RX ADMIN — DOXYCYCLINE 100 MG: 100 INJECTION, POWDER, LYOPHILIZED, FOR SOLUTION INTRAVENOUS at 08:34

## 2024-09-29 RX ADMIN — CEFEPIME 2000 MG: 2 INJECTION, POWDER, FOR SOLUTION INTRAVENOUS at 17:13

## 2024-09-29 RX ADMIN — INSULIN LISPRO 7 UNITS: 100 INJECTION, SOLUTION INTRAVENOUS; SUBCUTANEOUS at 17:30

## 2024-09-29 NOTE — THERAPY EVALUATION
Patient Name: Nieves Mc  : 1975    MRN: [id number]                              Today's Date: 2024       Admit Date: 2024    Visit Dx:     ICD-10-CM ICD-9-CM   1. Pneumonia due to infectious organism, unspecified laterality, unspecified part of lung  J18.9 486   2. 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    Menorrhagia    Thrombocytopenia    Hyperuricemia    NICM (nonischemic cardiomyopathy)    Dyspnea, unspecified type    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    Chronic pain    Chronic narcotic dependence    Dehydration    Vomiting    Bilateral lower extremity edema    Pancytopenia    Lumbar radiculopathy    Hypomagnesemia    Right upper quadrant abdominal pain    History of migraine    Fall during current hospitalization    Pubic ramus fracture, left, closed, initial encounter    Nausea    Frequent falls    Chronic constipation    Diabetic gastroparesis    Encounter for blood transfusion    Petechial rash    Adverse effect of chemotherapy    Hypercalcemia    PICC (peripherally inserted central catheter) flush    Anemia due to chemotherapy    Acute on chronic HFrEF (heart failure with reduced ejection fraction)    Acute hypoxemic respiratory failure     Past Medical History:   Diagnosis Date    Acute respiratory failure with hypoxia 2022    Adverse effect of chemotherapy 2023    Bilateral subdural hematomas 2023    Bone pain     Chronic  constipation 2023    CML (chronic myelocytic leukemia) 2018    COVID-19 virus infection 2022    Diabetes mellitus     Diabetic gastroparesis 2023    Extremity pain     [place]. legs pain    Fall during current hospitalization 2023    Leg pain     left leg greater    Medically noncompliant 2021    Migraine     Petechial rash 2023    Pubic ramus fracture, left, closed, initial encounter 2023    Pulmonary embolism     Traumatic subdural hemorrhage without loss of consciousness 2023    Tumor lysis syndrome 2022    UTI (urinary tract infection) 2023    Vision loss     doing surgery     Past Surgical History:   Procedure Laterality Date    BONE MARROW BIOPSY      BREAST SURGERY      BRONCHOSCOPY N/A 2022    Procedure: BRONCHOSCOPY bilateral lung washing;  Surgeon: Charlene Camara MD;  Location: [facility] ENDOSCOPY;  Service: Pulmonary;  Laterality: N/A;  post: rule out infection vs transfusion lung injury     SECTION      CHOLECYSTECTOMY      COLONOSCOPY N/A 2023    Procedure: COLONOSCOPY;  Surgeon: Freddy Villeda MD;  Location: [facility] ENDOSCOPY;  Service: Gastroenterology;  Laterality: N/A;  poor prep    ENDOSCOPY N/A 2023    Procedure: ESOPHAGOGASTRODUODENOSCOPY with biopsy x2 areas;  Surgeon: Freddy Villeda MD;  Location: [facility] ENDOSCOPY;  Service: Gastroenterology;  Laterality: N/A;  food in stomach;abnormal duodenal mucosa    EYE SURGERY      laser surgery due  to hemmorage--- 2021-- another surgery  lt eye11/15/21    RETINAL DETACHMENT SURGERY      SPINE SURGERY      Lombardi spinal block    TUBAL ABDOMINAL LIGATION        General Information       Row Name 24 1543          Physical Therapy Time and Intention    Document Type evaluation  -CL     Mode of Treatment individual therapy;physical therapy  -CL       Row Name 24 1544          General Information    Patient Profile Reviewed yes  -CL     Prior Level of Function  independent:;w/c or scooter;transfer;min assist:;ADL's;dressing  Pt states that she is primarily at w/c level  -CL     Existing Precautions/Restrictions fall;oxygen therapy device and L/min  -CL     Barriers to Rehab previous functional deficit;medically complex  -CL       Row Name 09/29/24 1543          Living Environment    People in Home other (see comments);child(tobin), dependent  Pt states she is currently living in a hotel with 8 y.o. grandson and caregiver  -CL       Row Name 09/29/24 1543          Home Main Entrance    Number of Stairs, Main Entrance none  -CL       Row Name 09/29/24 1543          Stairs Within Home, Primary    Number of Stairs, Within Home, Primary none  -CL       Row Name 09/29/24 1543          Cognition    Orientation Status (Cognition) oriented x 4  -CL       Row Name 09/29/24 1543          Safety Issues, Functional Mobility    Safety Issues Affecting Function (Mobility) insight into deficits/self-awareness;judgment  -CL     Impairments Affecting Function (Mobility) endurance/activity tolerance;strength  -CL               User Key  (r) = Recorded By, (t) = Taken By, (c) = Cosigned By      Initials Name Provider Type    CL Annabel Solano, PT Physical Therapist                   Mobility       Row Name 09/29/24 1545          Bed Mobility    Comment, (Bed Mobility) sitting on side of bed; states she does not lie down due to difficulty breathing; agreeable to transfer to recliner for improved positioning.  -CL       Row Name 09/29/24 1545          Bed-Chair Transfer    Bed-Chair [place] (Transfers) minimum assist (75% patient effort)  -CL     Assistive Device (Bed-Chair Transfers) walker, front-wheeled  -CL     Comment, (Bed-Chair Transfer) transferred from bed to BSC with min A as well as BSC to chair  -CL       Row Name 09/29/24 1545          Sit-Stand Transfer    Sit-Stand [place] (Transfers) minimum assist (75% patient effort)  -CL       Row Name 09/29/24 7349           Gait/Stairs (Locomotion)    [place] Level (Gait) minimum assist (75% patient effort)  -CL     Assistive Device (Gait) walker, front-wheeled  -CL     Distance in Feet (Gait) 4  -CL     Deviations/Abnormal Patterns (Gait) bilateral deviations;base of support, wide  -CL     Bilateral Gait Deviations forward flexed posture;heel strike decreased;knee hyperextension  -CL               User Key  (r) = Recorded By, (t) = Taken By, (c) = Cosigned By      Initials Name Provider Type    CL Annabel Solano, PT Physical Therapist                   Obj/Interventions       Row Name 09/29/24 1547          Range of Motion Comprehensive    General Range of Motion no range of motion deficits identified  -CL       Row Name 09/29/24 1547          Strength Comprehensive (MMT)    Comment, General Manual Muscle Testing (MMT) Assessment Hip flex 3/5, knee ext 3+5 bilaterally  -CL       Row Name 09/29/24 1547          Balance    Balance Assessment sitting static balance;sitting dynamic balance;standing static balance;standing dynamic balance  -CL     Static Sitting Balance independent  -CL     Dynamic Sitting Balance independent  -CL     Position, Sitting Balance sitting edge of bed  -CL     Static Standing Balance contact guard  -CL     Dynamic Standing Balance contact guard  -CL     Position/Device Used, Standing Balance supported;walker, rolling  -CL               User Key  (r) = Recorded By, (t) = Taken By, (c) = Cosigned By      Initials Name Provider Type    CL Annabel Solano, PT Physical Therapist                   Goals/Plan       Row Name 09/29/24 1551          Bed Mobility Goal 1 (PT)    Activity/Assistive Device (Bed Mobility Goal 1, PT) bed mobility activities, all  -CL     [place] Level/Cues Needed (Bed Mobility Goal 1, PT) modified independence  -CL     Time Frame (Bed Mobility Goal 1, PT) long term goal (LTG);2 weeks  -CL       Row Name 09/29/24 1551          Transfer Goal 1 (PT)    Activity/Assistive Device  (Transfer Goal 1, PT) sit-to-stand/stand-to-sit;bed-to-chair/chair-to-bed  -CL     [place] Level/Cues Needed (Transfer Goal 1, PT) modified independence  -CL     Time Frame (Transfer Goal 1, PT) long term goal (LTG);2 weeks  -CL       Row Name 09/29/24 1556          Gait Training Goal 1 (PT)    Activity/Assistive Device (Gait Training Goal 1, PT) gait (walking locomotion);assistive device use  -CL     [place] Level (Gait Training Goal 1, PT) minimum assist (75% or more patient effort)  -CL     Distance (Gait Training Goal 1, PT) 25'  -CL     Time Frame (Gait Training Goal 1, PT) long term goal (LTG);2 weeks  -CL       Row Name 09/29/24 1554          Therapy Assessment/Plan (PT)    Planned Therapy Interventions (PT) balance training;bed mobility training;gait training;neuromuscular re-education;patient/family education;strengthening;transfer training  -CL               User Key  (r) = Recorded By, (t) = Taken By, (c) = Cosigned By      Initials Name Provider Type    CL Annabel Solano, PT Physical Therapist                   Clinical Impression       Row Name 09/29/24 1540          Pain    Pretreatment Pain Rating 3/10  -CL     Posttreatment Pain Rating 3/10  -CL     Pain Location generalized  -CL     Pain Intervention(s) Repositioned  -CL       Row Name 09/29/24 1544          Plan of Care Review    Plan of Care Reviewed With patient  -CL     Outcome Evaluation Nieves Mc is a 48 y.o. female with PMH of CML, chronic anemia, Leukopenia, Bilateral SDH, DM and PE who presents from the cancer center with hypoxia and tachycardia due to PNA.   Pt states she is taking a break from living with her mother and currently staying in a hotel with 8 y.o. grandson.   is a  and her caregiver on weekends. One daughter is her paid caregiver during the week.  Pt is typically independent from the w/c level at baseline but has a RW as well.  She is not on O2 at home but currently requires 5L.  She  performs transfers with Min A & RA knees locked in extension and wide CHAU.   She appears close to her baseline but would benefit from  PT at discharge.  -CL       Row Name 09/29/24 1547          Therapy Assessment/Plan (PT)    Rehab Potential (PT) good, to achieve stated therapy goals  -CL     Criteria for Skilled Interventions Met (PT) yes;skilled treatment is necessary  -CL     Therapy Frequency (PT) 3 times/wk  -CL     Predicted Duration of Therapy Intervention (PT) Until discharge  -CL       Row Name 09/29/24 1547          Vital Signs    Pre SpO2 (%) 94  -CL     O2 Delivery Pre Treatment supplemental O2  5L  -CL     Post SpO2 (%) 95  -CL     O2 Delivery Post Treatment supplemental O2  -CL       Row Name 09/29/24 1547          Positioning and Restraints    Pre-Treatment Position in bed  -CL     Post Treatment Position chair  -CL     In Chair notified nsg;sitting;call light within reach;encouraged to call for assist;exit alarm on;legs elevated  -CL               User Key  (r) = Recorded By, (t) = Taken By, (c) = Cosigned By      Initials Name Provider Type    Annabel Lopez, PT Physical Therapist                   Outcome Measures       Row Name 09/29/24 1551 09/29/24 0833       How much help from another person do you currently need...    Turning from your back to your side while in flat bed without using bedrails? 3  -CL 4  -MA    Moving from lying on back to sitting on the side of a flat bed without bedrails? 3  -CL 4  -MA    Moving to and from a bed to a chair (including a wheelchair)? 3  -CL 3  -MA    Standing up from a chair using your arms (e.g., wheelchair, bedside chair)? 3  -CL 3  -MA    Climbing 3-5 steps with a railing? 1  -CL 1  -MA    To walk in hospital room? 3  -CL 2  -MA    AM-PAC 6 Clicks Score (PT) 16  -CL 17  -MA    Highest Level of Mobility Goal 5 --> Static standing  -CL 5 --> Static standing  -MA              User Key  (r) = Recorded By, (t) = Taken By, (c) = Cosigned By       Initials Name Provider Type    Vianey Avila LPN Licensed Nurse    Annabel Lopez, PT Physical Therapist                                 Physical Therapy Education       Title: PT OT SLP Therapies (Done)       Topic: Physical Therapy (Done)       Point: Mobility training (Done)       Learning Progress Summary             Patient Acceptance, E,TB, VU by ANIKA at 9/29/2024 8202                                         User Key       Initials Effective Dates Name Provider Type Discipline     03/02/22 -  Annabel Solano, PT Physical Therapist PT                  PT Recommendation and Plan  Planned Therapy Interventions (PT): balance training, bed mobility training, gait training, neuromuscular re-education, patient/family education, strengthening, transfer training  Plan of Care Reviewed With: patient  Outcome Evaluation: Nieves Mc is a 48 y.o. female with PMH of CML, chronic anemia, Leukopenia, Bilateral SDH, DM and PE who presents from the cancer center with hypoxia and tachycardia due to PNA.   Pt states she is taking a break from living with her mother and currently staying in a hotel with 8 y.o. grandson.   is a  and her caregiver on weekends. One daughter is her paid caregiver during the week.  Pt is typically independent from the w/c level at baseline but has a RW as well.  She is not on O2 at home but currently requires 5L.  She performs transfers with Min A & RA knees locked in extension and wide CHAU.   She appears close to her baseline but would benefit from  PT at discharge.     Time Calculation:   PT Evaluation Complexity  History, PT Evaluation Complexity: 3 or more personal factors and/or comorbidities  Examination of Body Systems (PT Eval Complexity): total of 3 or more elements  Clinical Presentation (PT Evaluation Complexity): evolving  Clinical Decision Making (PT Evaluation Complexity): moderate complexity  Overall Complexity (PT Evaluation Complexity): moderate  complexity     PT Charges       Row Name 09/29/24 1552             Time Calculation    Start Time 1405  -CL      Stop Time 1425  -CL      Time Calculation (min) 20 min  -CL      PT Received On 09/29/24  -CL      PT - Next Appointment 10/01/24  -CL      PT Goal Re-Cert Due Date 10/13/24  -CL         Time Calculation- PT    Total Timed Code Minutes- PT 0 minute(s)  -CL                User Key  (r) = Recorded By, (t) = Taken By, (c) = Cosigned By      Initials Name Provider Type    CL Annabel Solano, PT Physical Therapist                  Therapy Charges for Today       Code Description Service Date Service Provider Modifiers Qty    08140684399 HC PT EVAL MOD COMPLEXITY 4 9/29/2024 Annabel Solano, PT GP 1            PT G-Codes  AM-PAC 6 Clicks Score (PT): 16  PT Discharge Summary  Anticipated Discharge Disposition (PT): home with assist, home with home health    Annabel Solano, MEMO  9/29/2024
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
## 2024-09-29 NOTE — PROGRESS NOTES
[facility] MEDICINE SERVICE  DAILY PROGRESS NOTE    NAME: Nieves Mc  : 1975  MRN: [id number]      LOS: 2 days     PROVIDER OF SERVICE: Aristeo Noel MD    Chief Complaint: Acute hypoxemic respiratory failure    Subjective:     Interval History:  History taken from: patient      History of Present Illness: Nieves Mc is a 48 y.o. female with a PMH of CML on ponatinib, chronic anemia, leukopenia on bilateral subdural hematoma, systolic CHF, Diabetes type 1, PE on xarelto, anxiety who presented to [facility] on 2024 with shhypoxia. Patient was evaluated earlier at cancer center for a blood transfusion, was then found to be hypoxic SPO2 76 %. Patient was subsequently sent to ED for evaluation. Denies chest pain, fever chills, hemoptysis. She also denies SOB. Reports bilateral LE swelling.  Evaluation in ED patient saturating 86 %, requiring 3 L NC to maintain SPO2 > 90 %. Tachycardic in mid 110s, afebrile, slightly hypertensive. CXR done showed patchy right greater than left airspace opacities. CBC labs notable for neutropenia, Hb 7.7. Chemistry labs T bili 1.8, creat 1.29, BUN 34. HS trop marginally elevated 17, proBNP 4.257. The decision to admit was made.      24 seen in bd NAD, C/O dyspnea, on 5lts oxygen  24 still c/o dyspnea on 5 lts, bp stable, will likely need Oxygen on dc. DW RN    Review of Systems  Constitutional:  Positive for fatigue. Negative for fever.   HENT:  Positive for congestion.    Respiratory:  Positive for cough and shortness of breath. Negative for chest tightness.    Cardiovascular:  Negative for chest pain.   Gastrointestinal:  Negative for abdominal pain.   Genitourinary:  Negative for dysuria.   Psychiatric/Behavioral:  Negative for confusion.    Objective:     Vital Signs  Temp:  [97.6 °F (36.4 °C)-98.2 °F (36.8 °C)] 97.6 °F (36.4 °C)  Heart Rate:  [] 109  Resp:  [14-24] 19  BP: (108-140)/(57-85) 140/85  Flow (L/min):  [5] 5    Body mass index is 25.06 kg/m².    Physical Exam       General: She is not in acute distress.     Appearance: Normal appearance. She is ill-appearing.   HENT:      Head: Normocephalic.      Mouth/Throat:      Mouth: Mucous membranes are dry.      Pharynx: No oropharyngeal exudate.   Eyes:      Extraocular Movements: Extraocular movements intact.      Pupils: Pupils are equal, round, and reactive to light.   Cardiovascular:      Rate and Rhythm: Regular rhythm. Tachycardia present.      Pulses: Normal pulses.      Heart sounds: Normal heart sounds.   Pulmonary:      Effort: Pulmonary effort is normal.      Breath sounds: Rales present. No wheezing or rhonchi.   Abdominal:      General: Abdomen is flat.      Palpations: Abdomen is soft.   Musculoskeletal:      Cervical back: Neck supple.   Skin:     General: Skin is dry.      Capillary Refill: Capillary refill takes less than 2 seconds.      Coloration: Skin is pale.   Neurological:      General: No focal deficit present.      Mental Status: She is alert and oriented to person, place, and time.   Psychiatric:         Behavior: Behavior normal.      Diagnostic Data    Results from last 7 days   Lab Units 09/28/24  0511   WBC 10*3/mm3 4.30   HEMOGLOBIN g/dL 7.5*   HEMATOCRIT % 24.8*   PLATELETS 10*3/mm3 422   GLUCOSE mg/dL 91   CREATININE mg/dL 1.13*   BUN mg/dL 31*   SODIUM mmol/L 140   POTASSIUM mmol/L 5.3*   AST (SGOT) U/L 30   ALT (SGPT) U/L 24   ALK PHOS U/L 326*   BILIRUBIN mg/dL 1.3*   ANION GAP mmol/L 11.0       XR Chest 1 View    Result Date: 9/27/2024  Impression: Patchy right greater than left airspace opacities which may represent infection. Enlarged cardiac silhouette. Electronically Signed: Herbert Hutchinson MD  9/27/2024 5:21 PM EDT  Workstation ID: [id number]     Scheduled Meds:cefepime, 2,000 mg, Intravenous, Q12H  doxycycline, 100 mg, Intravenous, Q12H  furosemide, 20 mg, Intravenous, BID  gabapentin, 800 mg, Oral, Q8H  insulin glargine, 28 Units,  Subcutaneous, Nightly  insulin lispro, 2-7 Units, Subcutaneous, 4x Daily AC & at Bedtime  insulin lispro, 7 Units, Subcutaneous, TID With Meals  losartan, 25 mg, Oral, Q24H  metoprolol succinate XL, 25 mg, Oral, Q12H  mirtazapine, 15 mg, Oral, Nightly  rivaroxaban, 10 mg, Oral, Daily  sodium chloride, 10 mL, Intravenous, Q12H  tiZANidine, 4 mg, Oral, Daily      Continuous Infusions:Pharmacy to Dose Cefepime,       PRN Meds:.  acetaminophen **OR** acetaminophen **OR** acetaminophen    ALPRAZolam    senna-docusate sodium **AND** polyethylene glycol **AND** bisacodyl **AND** bisacodyl    Calcium Replacement - Follow Nurse / BPA Driven Protocol    dextrose    dextrose    Diclofenac Sodium    glucagon (human recombinant)    hydrALAZINE    Magnesium Standard Dose Replacement - Follow Nurse / BPA Driven Protocol    Pharmacy to Dose Cefepime    Phosphorus Replacement - Follow Nurse / BPA Driven Protocol    Potassium Replacement - Follow Nurse / BPA Driven Protocol    [COMPLETED] Insert Peripheral IV **AND** sodium chloride    sodium chloride    sodium chloride     I reviewed the patient's new clinical results.    Assessment/Plan:     Active and Resolved Problems  Active Hospital Problems    Diagnosis  POA    **Acute hypoxemic respiratory failure [J96.01]  Yes    Type 2 diabetes mellitus with diabetic polyneuropathy, with long-term current use of insulin [E11.42, Z79.4]  Not Applicable     Priority: High    Acute on chronic HFrEF (heart failure with reduced ejection fraction) [I50.23]  Yes    Sinus tachycardia [R00.0]  Yes    Anemia due to chemotherapy [D64.81, T45.1X5A]  Yes    NICM (nonischemic cardiomyopathy) [I42.8]  Yes    Medically noncompliant [Z91.199]  Not Applicable    History of pulmonary embolism [Z86.711]  Yes    CML (chronic myelocytic leukemia) [C92.10]  Yes      Resolved Hospital Problems   No resolved problems to display.     Acute hypoxemic respiratory failure due to Community Acquired Pneumonia and acute  on chronic diastolic CHF  Patient requiring 3 L NC oxygen supplementation to maintain SPO2 > 90 %  CXR showed bilateral airspace opacities R>L  Started on cefepime and doxycycline  Follow blood and sputum culture, atypical workup  Acute on chronic HFrEF (heart failure with reduced ejection fraction) secondary to NICM (nonischemic cardiomyopathy)  Bilateral lower extremity edema  Cardiology consulted  Previous EF 26-30% in Nov. 2022,  Repeat echocardiogram in March 2024 showed improvement in LV function with EF of 62%  Patient has been noncompliant with medications and outpatient follow up  High-sensitivity troponin of 17 and 22, proBNP of 4000  Previously proBNP was 12,000  Start Lasix  Monitor I's and O's and replace electrolytes as needed  Creatinine 1.13  Resume losartan, Toprol-XL and Jardiance  Pro BNP 4257  Start lasix 20 mg IV bid  TTE on 3/21/24 showed LVEF > 62 %, was 44 % on 3/10/24  Wean oxygen as tolerated     Type 1 DM  Continue insulin lantus 28 units HS  Continue lispro 7 units before meals plus SS     CML (chronic myelocytic leukemia)  On chemotherapy, followed by oncology   Chronic anemia  Neutropenia  Completed blood transfusion prior to coming to ED  Continue Ponatinib  Transfuse blood products as needed     History of anxiety  Continue Remeron   Continue Xanax    Sinus tachycardia-Due to underlying infection, anemia and hypoxia  Per cardiology Treat underlying causes  Increase Toprol-XL to twice daily    Medically noncompliant  Compliance encouraged  / consulted   HF Nurse Navigator consulted as above     VTE Prophylaxis:  Pharmacologic VTE prophylaxis orders are present.       Disposition Planning:     Barriers to Discharge:dyspnea  Anticipated Date of Discharge: 9/30/24  Place of Discharge: home      Time: 35 minutes     Code Status and Medical Interventions: CPR (Attempt to Resuscitate); Full Support   Ordered at: 09/28/24 0443     Code Status (Patient has no pulse and  is not breathing):    CPR (Attempt to Resuscitate)     Medical Interventions (Patient has pulse or is breathing):    Full Support       Signature: Electronically signed by Aristeo Noel MD, 09/29/24, 09:31 EDT.  [facility] Hospitalist Team

## 2024-09-29 NOTE — PLAN OF CARE
Goal Outcome Evaluation:  Plan of Care Reviewed With: patient           Outcome Evaluation: Nieves Mc is a 48 y.o. female with PMH of CML, chronic anemia, Leukopenia, Bilateral SDH, DM and PE who presents from the cancer center with hypoxia and tachycardia due to PNA.   Pt states she is taking a break from living with her mother and currently staying in a hotel with 8 y.o. grandson.   is a  and her caregiver on weekends. One daughter is her paid caregiver during the week.  Pt is typically independent from the w/c level at baseline but has a RW as well.  She is not on O2 at home but currently requires 5L.  She performs transfers with Min A & RA knees locked in extension and wide CHAU.   She appears close to her baseline but would benefit from  PT at discharge.      Anticipated Discharge Disposition (PT): home with assist, home with home health

## 2024-09-29 NOTE — PLAN OF CARE
Problem: Adult Inpatient Plan of Care  Goal: Plan of Care Review  Flowsheets (Taken 9/29/2024 7528)  Progress: no change  Plan of Care Reviewed With: patient   Goal Outcome Evaluation:  Plan of Care Reviewed With: patient        Progress: no change

## 2024-09-30 LAB
ABO GROUP BLD: NORMAL
ALBUMIN SERPL-MCNC: 3.6 G/DL (ref 3.5–5.2)
ALBUMIN/GLOB SERPL: 0.9 G/DL
ALP SERPL-CCNC: 422 U/L (ref 39–117)
ALT SERPL W P-5'-P-CCNC: 35 U/L (ref 1–33)
ANION GAP SERPL CALCULATED.3IONS-SCNC: 11.6 MMOL/L (ref 5–15)
ANISOCYTOSIS BLD QL: ABNORMAL
ANTI-E: NORMAL
ANTI-JKA: NORMAL
ANTI-S: NORMAL
AST SERPL-CCNC: 30 U/L (ref 1–32)
BACTERIA SPEC AEROBE CULT: ABNORMAL
BILIRUB SERPL-MCNC: 1 MG/DL (ref 0–1.2)
BLASTS NFR BLD MANUAL: 29 % (ref 0–0)
BLD GP AB SCN SERPL QL: POSITIVE
BUN SERPL-MCNC: 37 MG/DL (ref 6–20)
BUN/CREAT SERPL: 31.6 (ref 7–25)
CALCIUM SPEC-SCNC: 9.1 MG/DL (ref 8.6–10.5)
CHLORIDE SERPL-SCNC: 106 MMOL/L (ref 98–107)
CO2 SERPL-SCNC: 22.4 MMOL/L (ref 22–29)
CREAT SERPL-MCNC: 1.17 MG/DL (ref 0.57–1)
DAT POLY-SP REAG RBC QL: NEGATIVE
DEPRECATED RDW RBC AUTO: 62.6 FL (ref 37–54)
EGFRCR SERPLBLD CKD-EPI 2021: 57.7 ML/MIN/1.73
ERYTHROCYTE [DISTWIDTH] IN BLOOD BY AUTOMATED COUNT: 18.6 % (ref 12.3–15.4)
GLOBULIN UR ELPH-MCNC: 3.9 GM/DL
GLUCOSE BLDC GLUCOMTR-MCNC: 114 MG/DL (ref 70–105)
GLUCOSE BLDC GLUCOMTR-MCNC: 135 MG/DL (ref 70–105)
GLUCOSE BLDC GLUCOMTR-MCNC: 155 MG/DL (ref 70–105)
GLUCOSE BLDC GLUCOMTR-MCNC: 202 MG/DL (ref 70–105)
GLUCOSE SERPL-MCNC: 163 MG/DL (ref 65–99)
GRAM STN SPEC: ABNORMAL
HCT VFR BLD AUTO: 23.3 % (ref 34–46.6)
HGB BLD-MCNC: 7 G/DL (ref 12–15.9)
ISOLATED FROM: ABNORMAL
LYMPHOCYTES # BLD MANUAL: 2.19 10*3/MM3 (ref 0.7–3.1)
LYMPHOCYTES NFR BLD MANUAL: 2 % (ref 5–12)
M PNEUMO IGM SER IA-ACNC: <770 U/ML (ref 0–769)
MCH RBC QN AUTO: 27.9 PG (ref 26.6–33)
MCHC RBC AUTO-ENTMCNC: 30 G/DL (ref 31.5–35.7)
MCV RBC AUTO: 92.8 FL (ref 79–97)
METAMYELOCYTES NFR BLD MANUAL: 1 % (ref 0–0)
MONOCYTES # BLD: 0.11 10*3/MM3 (ref 0.1–0.9)
MYELOCYTES NFR BLD MANUAL: 2 % (ref 0–0)
NEUTROPHILS # BLD AUTO: 1.42 10*3/MM3 (ref 1.7–7)
NEUTROPHILS NFR BLD MANUAL: 9 % (ref 42.7–76)
NEUTS BAND NFR BLD MANUAL: 17 % (ref 0–5)
NONSPECIFIC ANTIBODY: NORMAL
NT-PROBNP SERPL-MCNC: 2153 PG/ML (ref 0–450)
PLATELET # BLD AUTO: 280 10*3/MM3 (ref 140–450)
PMV BLD AUTO: 9.9 FL (ref 6–12)
POTASSIUM SERPL-SCNC: 4.8 MMOL/L (ref 3.5–5.2)
PROT SERPL-MCNC: 7.5 G/DL (ref 6–8.5)
RBC # BLD AUTO: 2.51 10*6/MM3 (ref 3.77–5.28)
RH BLD: POSITIVE
SCAN SLIDE: NORMAL
SMALL PLATELETS BLD QL SMEAR: ADEQUATE
SODIUM SERPL-SCNC: 140 MMOL/L (ref 136–145)
T&S EXPIRATION DATE: NORMAL
VARIANT LYMPHS NFR BLD MANUAL: 3 % (ref 0–5)
VARIANT LYMPHS NFR BLD MANUAL: 37 % (ref 19.6–45.3)
WBC MORPH BLD: NORMAL
WBC NRBC COR # BLD AUTO: 5.47 10*3/MM3 (ref 3.4–10.8)

## 2024-09-30 PROCEDURE — 86902 BLOOD TYPE ANTIGEN DONOR EA: CPT

## 2024-09-30 PROCEDURE — 25010000002 CEFEPIME PER 500 MG: Performed by: STUDENT IN AN ORGANIZED HEALTH CARE EDUCATION/TRAINING PROGRAM

## 2024-09-30 PROCEDURE — 25010000002 FUROSEMIDE PER 20 MG: Performed by: STUDENT IN AN ORGANIZED HEALTH CARE EDUCATION/TRAINING PROGRAM

## 2024-09-30 PROCEDURE — 93005 ELECTROCARDIOGRAM TRACING: CPT | Performed by: INTERNAL MEDICINE

## 2024-09-30 PROCEDURE — 80053 COMPREHEN METABOLIC PANEL: CPT | Performed by: NURSE PRACTITIONER

## 2024-09-30 PROCEDURE — 86850 RBC ANTIBODY SCREEN: CPT | Performed by: INTERNAL MEDICINE

## 2024-09-30 PROCEDURE — 83880 ASSAY OF NATRIURETIC PEPTIDE: CPT | Performed by: INTERNAL MEDICINE

## 2024-09-30 PROCEDURE — 99233 SBSQ HOSP IP/OBS HIGH 50: CPT | Performed by: INTERNAL MEDICINE

## 2024-09-30 PROCEDURE — 85025 COMPLETE CBC W/AUTO DIFF WBC: CPT | Performed by: NURSE PRACTITIONER

## 2024-09-30 PROCEDURE — 86880 COOMBS TEST DIRECT: CPT | Performed by: INTERNAL MEDICINE

## 2024-09-30 PROCEDURE — 86922 COMPATIBILITY TEST ANTIGLOB: CPT

## 2024-09-30 PROCEDURE — 85007 BL SMEAR W/DIFF WBC COUNT: CPT | Performed by: NURSE PRACTITIONER

## 2024-09-30 PROCEDURE — 82948 REAGENT STRIP/BLOOD GLUCOSE: CPT

## 2024-09-30 PROCEDURE — 63710000001 INSULIN LISPRO (HUMAN) PER 5 UNITS: Performed by: STUDENT IN AN ORGANIZED HEALTH CARE EDUCATION/TRAINING PROGRAM

## 2024-09-30 PROCEDURE — 86900 BLOOD TYPING SEROLOGIC ABO: CPT | Performed by: INTERNAL MEDICINE

## 2024-09-30 PROCEDURE — 93010 ELECTROCARDIOGRAM REPORT: CPT | Performed by: INTERNAL MEDICINE

## 2024-09-30 PROCEDURE — 86901 BLOOD TYPING SEROLOGIC RH(D): CPT | Performed by: INTERNAL MEDICINE

## 2024-09-30 PROCEDURE — 82948 REAGENT STRIP/BLOOD GLUCOSE: CPT | Performed by: STUDENT IN AN ORGANIZED HEALTH CARE EDUCATION/TRAINING PROGRAM

## 2024-09-30 PROCEDURE — 86870 RBC ANTIBODY IDENTIFICATION: CPT | Performed by: INTERNAL MEDICINE

## 2024-09-30 RX ORDER — DIPHENHYDRAMINE HCL 25 MG
25 CAPSULE ORAL ONCE
Status: COMPLETED | OUTPATIENT
Start: 2024-09-30 | End: 2024-10-01

## 2024-09-30 RX ORDER — FUROSEMIDE 10 MG/ML
20 INJECTION INTRAMUSCULAR; INTRAVENOUS ONCE
Status: COMPLETED | OUTPATIENT
Start: 2024-09-30 | End: 2024-10-01

## 2024-09-30 RX ORDER — METOPROLOL SUCCINATE 50 MG/1
50 TABLET, EXTENDED RELEASE ORAL EVERY 12 HOURS SCHEDULED
Status: DISCONTINUED | OUTPATIENT
Start: 2024-09-30 | End: 2024-10-10 | Stop reason: HOSPADM

## 2024-09-30 RX ORDER — METOPROLOL SUCCINATE 25 MG/1
25 TABLET, EXTENDED RELEASE ORAL EVERY 12 HOURS SCHEDULED
Status: DISCONTINUED | OUTPATIENT
Start: 2024-09-30 | End: 2024-09-30

## 2024-09-30 RX ORDER — ACETAMINOPHEN 160 MG/5ML
650 SOLUTION ORAL ONCE
Status: DISCONTINUED | OUTPATIENT
Start: 2024-09-30 | End: 2024-10-07

## 2024-09-30 RX ORDER — ACETAMINOPHEN 650 MG/1
650 SUPPOSITORY RECTAL ONCE
Status: DISCONTINUED | OUTPATIENT
Start: 2024-09-30 | End: 2024-10-07

## 2024-09-30 RX ORDER — LOSARTAN POTASSIUM 25 MG/1
12.5 TABLET ORAL
Status: DISCONTINUED | OUTPATIENT
Start: 2024-10-01 | End: 2024-10-10 | Stop reason: HOSPADM

## 2024-09-30 RX ORDER — DIPHENHYDRAMINE HYDROCHLORIDE 50 MG/ML
25 INJECTION INTRAMUSCULAR; INTRAVENOUS ONCE
Status: COMPLETED | OUTPATIENT
Start: 2024-09-30 | End: 2024-10-01

## 2024-09-30 RX ORDER — ACETAMINOPHEN 325 MG/1
650 TABLET ORAL ONCE
Status: DISCONTINUED | OUTPATIENT
Start: 2024-09-30 | End: 2024-10-07

## 2024-09-30 RX ADMIN — Medication 10 ML: at 21:12

## 2024-09-30 RX ADMIN — GABAPENTIN 800 MG: 400 CAPSULE ORAL at 06:15

## 2024-09-30 RX ADMIN — CEFEPIME 2000 MG: 2 INJECTION, POWDER, FOR SOLUTION INTRAVENOUS at 17:48

## 2024-09-30 RX ADMIN — METOPROLOL SUCCINATE 25 MG: 25 TABLET, EXTENDED RELEASE ORAL at 06:15

## 2024-09-30 RX ADMIN — INSULIN LISPRO 2 UNITS: 100 INJECTION, SOLUTION INTRAVENOUS; SUBCUTANEOUS at 08:33

## 2024-09-30 RX ADMIN — LOSARTAN POTASSIUM 25 MG: 25 TABLET, FILM COATED ORAL at 08:33

## 2024-09-30 RX ADMIN — INSULIN LISPRO 7 UNITS: 100 INJECTION, SOLUTION INTRAVENOUS; SUBCUTANEOUS at 17:48

## 2024-09-30 RX ADMIN — FUROSEMIDE 20 MG: 10 INJECTION, SOLUTION INTRAMUSCULAR; INTRAVENOUS at 06:15

## 2024-09-30 RX ADMIN — MIRTAZAPINE 15 MG: 15 TABLET, FILM COATED ORAL at 21:12

## 2024-09-30 RX ADMIN — GABAPENTIN 800 MG: 400 CAPSULE ORAL at 21:12

## 2024-09-30 RX ADMIN — INSULIN LISPRO 7 UNITS: 100 INJECTION, SOLUTION INTRAVENOUS; SUBCUTANEOUS at 12:33

## 2024-09-30 RX ADMIN — INSULIN LISPRO 7 UNITS: 100 INJECTION, SOLUTION INTRAVENOUS; SUBCUTANEOUS at 08:33

## 2024-09-30 RX ADMIN — TIZANIDINE 4 MG: 4 TABLET ORAL at 08:33

## 2024-09-30 RX ADMIN — INSULIN LISPRO 3 UNITS: 100 INJECTION, SOLUTION INTRAVENOUS; SUBCUTANEOUS at 12:34

## 2024-09-30 RX ADMIN — DOXYCYCLINE 100 MG: 100 INJECTION, POWDER, LYOPHILIZED, FOR SOLUTION INTRAVENOUS at 21:12

## 2024-09-30 RX ADMIN — METOPROLOL SUCCINATE 50 MG: 50 TABLET, EXTENDED RELEASE ORAL at 21:12

## 2024-09-30 RX ADMIN — CEFEPIME 2000 MG: 2 INJECTION, POWDER, FOR SOLUTION INTRAVENOUS at 06:15

## 2024-09-30 RX ADMIN — DOXYCYCLINE 100 MG: 100 INJECTION, POWDER, LYOPHILIZED, FOR SOLUTION INTRAVENOUS at 10:15

## 2024-09-30 RX ADMIN — RIVAROXABAN 10 MG: 20 TABLET, FILM COATED ORAL at 08:33

## 2024-09-30 RX ADMIN — FUROSEMIDE 20 MG: 10 INJECTION, SOLUTION INTRAMUSCULAR; INTRAVENOUS at 17:48

## 2024-09-30 RX ADMIN — GABAPENTIN 800 MG: 400 CAPSULE ORAL at 14:40

## 2024-09-30 RX ADMIN — ALPRAZOLAM 0.5 MG: 0.5 TABLET ORAL at 21:12

## 2024-09-30 NOTE — PROGRESS NOTES
[facility] MEDICINE SERVICE  DAILY PROGRESS NOTE    NAME: Nieves Mc  : 1975  MRN: [id number]      LOS: 3 days     PROVIDER OF SERVICE: Aristeo Noel MD    Chief Complaint: Acute hypoxemic respiratory failure    Subjective:     Interval History:  History taken from: patient      History of Present Illness: Nieves Mc is a 48 y.o. female with a PMH of CML on ponatinib, chronic anemia, leukopenia on bilateral subdural hematoma, systolic CHF, Diabetes type 1, PE on xarelto, anxiety who presented to [facility] on 2024 with shhypoxia. Patient was evaluated earlier at cancer center for a blood transfusion, was then found to be hypoxic SPO2 76 %. Patient was subsequently sent to ED for evaluation. Denies chest pain, fever chills, hemoptysis. She also denies SOB. Reports bilateral LE swelling.  Evaluation in ED patient saturating 86 %, requiring 3 L NC to maintain SPO2 > 90 %. Tachycardic in mid 110s, afebrile, slightly hypertensive. CXR done showed patchy right greater than left airspace opacities. CBC labs notable for neutropenia, Hb 7.7. Chemistry labs T bili 1.8, creat 1.29, BUN 34. HS trop marginally elevated 17, proBNP 4.257. The decision to admit was made.      24 seen in bd NAD, C/O dyspnea, on 5lts oxygen  24 still c/o dyspnea on 5 lts, bp stable, will likely need Oxygen on dc. GIRMA RN  24 patient seen and examined in bed no acute distress, heart rate 133, dyspnea on 5 L, hemoglobin 7, will transfuse 1 units of packed RBC, discussed with RN.    Review of Systems  Constitutional:  Positive for fatigue. Negative for fever.   HENT:  Positive for congestion.    Respiratory:  Positive for cough and shortness of breath. Negative for chest tightness.    Cardiovascular:  Negative for chest pain.   Gastrointestinal:  Negative for abdominal pain.   Genitourinary:  Negative for dysuria.   Psychiatric/Behavioral:  Negative for confusion.    Objective:     Vital  Signs  Temp:  [97.5 °F (36.4 °C)-98.9 °F (37.2 °C)] 97.5 °F (36.4 °C)  Heart Rate:  [100-144] 133  Resp:  [14-17] 17  BP: (108-129)/(65-76) 114/72  Flow (L/min):  [2-5] 5   Body mass index is 25.06 kg/m².    Physical Exam       General: She is not in acute distress.     Appearance: Normal appearance. She is ill-appearing.   HENT:      Head: Normocephalic.      Mouth/Throat:      Mouth: Mucous membranes are dry.      Pharynx: No oropharyngeal exudate.   Eyes:      Extraocular Movements: Extraocular movements intact.      Pupils: Pupils are equal, round, and reactive to light.   Cardiovascular:      Rate and Rhythm: Regular rhythm. Tachycardia present.      Pulses: Normal pulses.      Heart sounds: Normal heart sounds.   Pulmonary:      Effort: Pulmonary effort is normal.      Breath sounds: Rales present. No wheezing or rhonchi.   Abdominal:      General: Abdomen is flat.      Palpations: Abdomen is soft.   Musculoskeletal:      Cervical back: Neck supple.   Skin:     General: Skin is dry.      Capillary Refill: Capillary refill takes less than 2 seconds.      Coloration: Skin is pale.   Neurological:      General: No focal deficit present.      Mental Status: She is alert and oriented to person, place, and time.   Psychiatric:         Behavior: Behavior normal.      Diagnostic Data    Results from last 7 days   Lab Units 09/30/24  0401   WBC 10*3/mm3 5.47   HEMOGLOBIN g/dL 7.0*   HEMATOCRIT % 23.3*   PLATELETS 10*3/mm3 280   GLUCOSE mg/dL 163*   CREATININE mg/dL 1.17*   BUN mg/dL 37*   SODIUM mmol/L 140   POTASSIUM mmol/L 4.8   AST (SGOT) U/L 30   ALT (SGPT) U/L 35*   ALK PHOS U/L 422*   BILIRUBIN mg/dL 1.0   ANION GAP mmol/L 11.6       No radiology results for the last day    Scheduled Meds:cefepime, 2,000 mg, Intravenous, Q12H  doxycycline, 100 mg, Intravenous, Q12H  furosemide, 20 mg, Intravenous, BID  furosemide, 20 mg, Intravenous, Once  gabapentin, 800 mg, Oral, Q8H  insulin glargine, 28 Units, Subcutaneous,  Nightly  insulin lispro, 2-7 Units, Subcutaneous, 4x Daily AC & at Bedtime  insulin lispro, 7 Units, Subcutaneous, TID With Meals  losartan, 25 mg, Oral, Q24H  metoprolol succinate XL, 50 mg, Oral, Q12H  mirtazapine, 15 mg, Oral, Nightly  rivaroxaban, 10 mg, Oral, Daily  sodium chloride, 10 mL, Intravenous, Q12H  tiZANidine, 4 mg, Oral, Daily      Continuous Infusions:Pharmacy to Dose Cefepime,       PRN Meds:.  acetaminophen **OR** acetaminophen **OR** acetaminophen    ALPRAZolam    senna-docusate sodium **AND** polyethylene glycol **AND** bisacodyl **AND** bisacodyl    Calcium Replacement - Follow Nurse / BPA Driven Protocol    dextrose    dextrose    Diclofenac Sodium    glucagon (human recombinant)    hydrALAZINE    Magnesium Standard Dose Replacement - Follow Nurse / BPA Driven Protocol    ondansetron    Pharmacy to Dose Cefepime    Phosphorus Replacement - Follow Nurse / BPA Driven Protocol    Potassium Replacement - Follow Nurse / BPA Driven Protocol    [COMPLETED] Insert Peripheral IV **AND** sodium chloride    sodium chloride    sodium chloride     I reviewed the patient's new clinical results.    Assessment/Plan:     Active and Resolved Problems  Active Hospital Problems    Diagnosis  POA    **Acute hypoxemic respiratory failure [J96.01]  Yes    Type 2 diabetes mellitus with diabetic polyneuropathy, with long-term current use of insulin [E11.42, Z79.4]  Not Applicable     Priority: High    Acute on chronic HFrEF (heart failure with reduced ejection fraction) [I50.23]  Yes    Sinus tachycardia [R00.0]  Yes    Anemia due to chemotherapy [D64.81, T45.1X5A]  Yes    NICM (nonischemic cardiomyopathy) [I42.8]  Yes    Medically noncompliant [Z91.199]  Not Applicable    History of pulmonary embolism [Z86.711]  Yes    CML (chronic myelocytic leukemia) [C92.10]  Yes      Resolved Hospital Problems   No resolved problems to display.     Acute hypoxemic respiratory failure due to Community Acquired Pneumonia and acute  on chronic diastolic CHF  Patient requiring 3 L NC oxygen supplementation to maintain SPO2 > 90 %  CXR showed bilateral airspace opacities R>L  Started on cefepime and doxycycline  Follow blood and sputum culture, atypical workup  Acute on chronic HFrEF (heart failure with reduced ejection fraction) secondary to NICM (nonischemic cardiomyopathy)  Bilateral lower extremity edema  Cardiology consulted  Previous EF 26-30% in Nov. 2022,  Repeat echocardiogram in March 2024 showed improvement in LV function with EF of 62%  Patient has been noncompliant with medications and outpatient follow up  High-sensitivity troponin of 17 and 22, proBNP of 4000  Start Lasix  Monitor I's and O's and replace electrolytes as needed  Creatinine 1.13  Resume losartan, Toprol-XL and Jardiance  Pro BNP 4257>2153  -lasix 20 mg IV bid  TTE on 3/21/24 showed LVEF > 62 %, was 44 % on 3/10/24  Wean oxygen as tolerated     Type 1 DM  Continue insulin lantus 28 units HS  Continue lispro 7 units before meals plus SS     CML (chronic myelocytic leukemia)  On chemotherapy, followed by oncology   Chronic anemia  Neutropenia  Completed blood transfusion prior to coming to ED  Continue Ponatinib  Transfuse blood products as needed     History of anxiety  Continue Remeron   Continue Xanax    Sinus tachycardia-Due to underlying infection, anemia and hypoxia  Per cardiology Treat underlying causes  Increased Toprol-XL to twice daily    Medically noncompliant  Compliance encouraged  / consulted   HF Nurse Navigator consulted as above     VTE Prophylaxis:  Pharmacologic VTE prophylaxis orders are present.      Disposition Planning:   Barriers to Discharge:dyspnea  Anticipated Date of Discharge: 9/30/24  Place of Discharge: home      Time: 35 minutes     Code Status and Medical Interventions: CPR (Attempt to Resuscitate); Full Support   Ordered at: 09/28/24 0443     Code Status (Patient has no pulse and is not breathing):    CPR  (Attempt to Resuscitate)     Medical Interventions (Patient has pulse or is breathing):    Full Support       Signature: Electronically signed by Aristeo Noel MD, 09/30/24, 11:52 EDT.  [facility] Hospitalist Team

## 2024-09-30 NOTE — PLAN OF CARE
Goal Outcome Evaluation:  Plan of Care Reviewed With: patient     Problem: Adult Inpatient Plan of Care  Goal: Plan of Care Review  Outcome: Progressing  Flowsheets (Taken 9/30/2024 3843)  Progress: no change  Plan of Care Reviewed With: patient        Progress: no change          Pt rested comfortably in chair throughout the night without complaints of pain.  Pt's hr would sporadically sustain in the 130-140's and then return to low 100's.  Pt was asymptomatic and stated this has been happening all weekend.  EKG obtained; tachycardia.  Heart rate in 130-140'2 this morning, gave scheduled metoprolol early per NP order.

## 2024-09-30 NOTE — PLAN OF CARE
Goal Outcome Evaluation:  Plan of Care Reviewed With: patient        Progress: no change  Outcome Evaluation: Patient requiring 3L NC with complaints of shortness of breath on exertion. Blood pressure medications being adjusted by cardiology. 1 unit of PRBC ordered, awaiting blood to be ready. Premedication ordered and IV lasix post transfusion. Patient has had a previous reaction to blood that includes sweating and hives.  pBNP improved. Patient remains on IV lasix. No complaints at this time. Patient sitting up in the chair. Alarms present and call light within reach.

## 2024-09-30 NOTE — DISCHARGE PLACEMENT REQUEST
"J Luis Kowalski (48 y.o. Female)       Date of Birth   1975    Social Security Number       Address   [address] [place] [address]    Home Phone   [phone]    MRN   [id number]       M[employer]onism   Synagogue    Marital Status   Legally                             Admission Date   9/27/24    Admission Type   Emergency    Admitting Provider   Llanes Alvarez, Carlos, MD    Attending Provider   Aristeo Noel MD    Department, Room/Bed   [facility] 2D, 263/1       Discharge Date       Discharge Disposition       Discharge Destination                                 Attending Provider: Aristeo Noel MD    Allergies: No Known Allergies    Isolation: Contact   Infection: ESBL E coli (02/15/23)   Code Status: CPR    Ht: 162.6 cm (64\")   Wt: 66.2 kg (146 lb)    Admission Cmt: None   Principal Problem: Acute hypoxemic respiratory failure [J96.01]                   Active Insurance as of 9/27/2024       Primary Coverage       Payor Plan Insurance Group Employer/Plan Group    ANTHEM MEDICARE REPLACEMENT ANTHEM MEDICARE ADVANTAGE [id number]       Payor Plan Address Payor Plan Phone Number Payor Plan Fax Number Effective Dates    [address] [phone]  1/1/2024 - None Entered    [facility] [address]         Subscriber Name Subscriber Birth Date Member ID       J LUIS KOWALSKI 1975 [id number]               Secondary Coverage       Payor Plan Insurance Group Employer/Plan Group    [place]        Payor Plan Address Payor Plan Phone Number Payor Plan Fax Number Effective Dates    [address] [address]   6/1/2023 - None Entered    [place] [address]         Subscriber Name Subscriber Birth Date Member ID       J LUIS KOWALSKI 1975 [id number]                     Emergency Contacts        (Rel.) Home Phone Work Phone Mobile Phone    NEREIDA KOWALSKI (Spouse) [phone] -- [phone]    Givings,Dynasty (Daughter) -- -- " [phone]    Primitivo Mc ([facility]) -- -- [phone]

## 2024-09-30 NOTE — PROGRESS NOTES
Referring Provider: Aristeo Noel MD    Reason for follow-up: HFrEF, tachycardia     Patient Care Team:  Pankaj Stover MD as PCP - General (Internal Medicine)  Meghann Lerma MD as Consulting Physician (Hematology and Oncology)  Hamida Feldman MD as Consulting Physician (Cardiology)  Rohan Jackson MD as Cardiologist (Cardiology)      SUBJECTIVE  Complains of shortness of breath, palpitations and is tachycardic this morning.     ROS  Review of all systems negative except as indicated.    Since I have last seen, the patient has been without any chest discomfort, shortness of breath, palpitations, dizziness or syncope.  Denies having any headache, abdominal pain, nausea, vomiting, diarrhea, constipation, loss of weight or loss of appetite.  Denies having any excessive bruising, hematuria or blood in the stool.        Personal History:    Past Medical History:   Diagnosis Date    Acute respiratory failure with hypoxia 07/28/2022    Adverse effect of chemotherapy 11/08/2023    Bilateral subdural hematomas 05/18/2023    Bone pain     Chronic constipation 07/07/2023    CML (chronic myelocytic leukemia) 04/01/2018    COVID-19 virus infection 01/12/2022    Diabetes mellitus     Diabetic gastroparesis 07/07/2023    Extremity pain     Carlin. legs pain    Fall during current hospitalization 06/25/2023    Leg pain     left leg greater    Medically noncompliant 03/11/2021    Migraine     Petechial rash 11/08/2023    Pubic ramus fracture, left, closed, initial encounter 06/25/2023    Pulmonary embolism     Traumatic subdural hemorrhage without loss of consciousness 05/17/2023    Tumor lysis syndrome 07/05/2022    UTI (urinary tract infection) 07/06/2023    Vision loss     doing surgery       Past Surgical History:   Procedure Laterality Date    BONE MARROW BIOPSY      BREAST SURGERY      BRONCHOSCOPY N/A 6/6/2022    Procedure: BRONCHOSCOPY bilateral lung washing;  Surgeon: Charlene Camara MD;  Location: [facility]  ENDOSCOPY;  Service: Pulmonary;  Laterality: N/A;  post: rule out infection vs transfusion lung injury     SECTION      CHOLECYSTECTOMY      COLONOSCOPY N/A 2023    Procedure: COLONOSCOPY;  Surgeon: Freddy Villeda MD;  Location: [facility] ENDOSCOPY;  Service: Gastroenterology;  Laterality: N/A;  poor prep    ENDOSCOPY N/A 2023    Procedure: ESOPHAGOGASTRODUODENOSCOPY with biopsy x2 areas;  Surgeon: Freddy Villeda MD;  Location: [facility] ENDOSCOPY;  Service: Gastroenterology;  Laterality: N/A;  food in stomach;abnormal duodenal mucosa    EYE SURGERY      laser surgery due  to hemmorage--- 2021-- another surgery  lt eye11/15/21    RETINAL DETACHMENT SURGERY      SPINE SURGERY      Lombardi spinal block    TUBAL ABDOMINAL LIGATION         Family History   Problem Relation Age of Onset    Diabetes Mother     Diabetes Maternal Grandmother     Heart attack Maternal Grandmother     Stroke Maternal Grandmother        Social History     Tobacco Use    Smoking status: Never    Smokeless tobacco: Never   Vaping Use    Vaping status: Never Used   Substance Use Topics    Alcohol use: No    Drug use: No        Home meds:  Prior to Admission medications    Medication Sig Start Date End Date Taking? Authorizing Provider   acetaminophen (TYLENOL) 325 MG tablet Take 2 tablets by mouth Every 4 (Four) Hours As Needed for Moderate Pain. Indications: Fever, Pain 24  Yes Meghann Lerma MD   ALPRAZolam (Xanax) 0.5 MG tablet Take 1 tablet by mouth At Night As Needed for Anxiety. Indications: Feeling Anxious 24  Yes Denisha Gill APRN   Diclofenac Sodium (VOLTAREN) 1 % gel gel Apply 4 g topically to the appropriate area as directed 4 (Four) Times a Day As Needed (hip pain). 24  Yes Denisha Gill APRN   doxycycline (VIBRAMYICN) 100 MG tablet Take 1 tablet by mouth 2 (Two) Times a Day for 10 days. 9/26/24 10/6/24 Yes Meghann Lerma MD   furosemide (LASIX) 40 MG tablet Take 1  tablet by mouth Daily. Indications: Edema 9/19/24  Yes Pankaj Stover MD   gabapentin (Neurontin) 800 MG tablet Take 1 tablet by mouth 3 (Three) Times a Day. Ordered through [facility]  Indications: Diabetes with Nerve Disease 5/15/24  Yes Pankaj Stover MD   Insulin Glargine (LANTUS SOLOSTAR) 100 UNIT/ML injection pen Inject 28 Units under the skin into the appropriate area as directed Every Night. Indications: Type 2 Diabetes 9/16/24  Yes Pankaj Stover MD   Insulin Lispro, 1 Unit Dial, (HumaLOG KwikPen) 100 UNIT/ML solution pen-injector Inject 7 Units under the skin into the appropriate area as directed 3 (Three) Times a Day Before Meals. Dx code: E11.65 9/16/24  Yes Pankaj Stover MD   levoFLOXacin (Levaquin) 500 MG tablet Take 1 tablet by mouth Daily. 9/24/24  Yes Meghann Lerma MD   mirtazapine (REMERON SOL-TAB) 15 MG disintegrating tablet Place 1 tablet on the tongue Every Night.   Yes ProviderMelecio MD   pain patient supplied pump by Intrathecal route Continuous.   Yes ProviderMelecio MD   PONATinib HCl (Iclusig) 10 MG tablet Take 10 mg by mouth Every Other Day. Take with or without food.  Swallow whole, do not crush or chew. 8/21/24  Yes Meghann Lerma MD   rivaroxaban (Xarelto) 10 MG tablet Take 1 tablet by mouth Daily. 6/11/24  Yes Denisha Gill APRN   tiZANidine (ZANAFLEX) 4 MG tablet Take 1 tablet by mouth Daily. Indications: Musculoskeletal Pain 5/15/24  Yes Pankaj Stover MD       Allergies:  Patient has no known allergies.    Scheduled Meds:cefepime, 2,000 mg, Intravenous, Q12H  doxycycline, 100 mg, Intravenous, Q12H  furosemide, 20 mg, Intravenous, BID  gabapentin, 800 mg, Oral, Q8H  insulin glargine, 28 Units, Subcutaneous, Nightly  insulin lispro, 2-7 Units, Subcutaneous, 4x Daily AC & at Bedtime  insulin lispro, 7 Units, Subcutaneous, TID With Meals  losartan, 25 mg, Oral, Q24H  metoprolol succinate XL, 25 mg, Oral, Q12H  mirtazapine, 15  "mg, Oral, Nightly  rivaroxaban, 10 mg, Oral, Daily  sodium chloride, 10 mL, Intravenous, Q12H  tiZANidine, 4 mg, Oral, Daily      Continuous Infusions:Pharmacy to Dose Cefepime,       PRN Meds:.  acetaminophen **OR** acetaminophen **OR** acetaminophen    ALPRAZolam    senna-docusate sodium **AND** polyethylene glycol **AND** bisacodyl **AND** bisacodyl    Calcium Replacement - Follow Nurse / BPA Driven Protocol    dextrose    dextrose    Diclofenac Sodium    glucagon (human recombinant)    hydrALAZINE    Magnesium Standard Dose Replacement - Follow Nurse / BPA Driven Protocol    ondansetron    Pharmacy to Dose Cefepime    Phosphorus Replacement - Follow Nurse / BPA Driven Protocol    Potassium Replacement - Follow Nurse / BPA Driven Protocol    [COMPLETED] Insert Peripheral IV **AND** sodium chloride    sodium chloride    sodium chloride      OBJECTIVE    Vital Signs  Vitals:    09/29/24 1523 09/29/24 1909 09/29/24 2321 09/30/24 0350   BP: 112/73 108/69 129/76 129/65   BP Location: Right arm Right arm Right arm Right arm   Patient Position: Sitting Sitting Lying Lying   Pulse: 100 (!) 137 111 (!) 139   Resp: 17 15 14 16   Temp:  98.5 °F (36.9 °C) 98.9 °F (37.2 °C) 98.2 °F (36.8 °C)   TempSrc: Oral Oral Oral Oral   SpO2: 93% 90% 94% 91%   Weight:       Height:           Flowsheet Rows      Flowsheet Row First Filed Value   Admission Height 160 cm (63\") Documented at 09/27/2024 1630   Admission Weight 66.2 kg (146 lb) Documented at 09/27/2024 1630              Intake/Output Summary (Last 24 hours) at 9/30/2024 0609  Last data filed at 9/30/2024 0249  Gross per 24 hour   Intake 580 ml   Output 200 ml   Net 380 ml          Telemetry: Sinus tachycardia    Physical Exam:  The patient is alert, oriented and in no distress.  Vital signs as noted above.  Head and neck revealed no carotid bruits or jugular venous distention.  No thyromegaly or lymphadenopathy is present  Lungs clear.  No wheezing.  Breath sounds are normal " bilaterally.  Heart normal first and second heart sounds.  No murmur. No precordial rub is present.  No gallop is present.  Abdomen soft and nontender.  No organomegaly is present.  Extremities with good peripheral pulses with bilateral lower extremity edema  Skin warm and dry.  Musculoskeletal system is grossly normal.  CNS grossly normal.       Results Review:  I have personally reviewed the results from the time of this admission to 9/30/2024 06:09 EDT and agree with these findings:  []  Laboratory  []  Microbiology  []  Radiology  []  EKG/Telemetry   []  Cardiology/Vascular   []  Pathology  []  Old records  []  Other:    Most notable findings include:    Lab Results (last 24 hours)       Procedure Component Value Units Date/Time    CBC & Differential [[id number]]  (Abnormal) Collected: 09/30/24 0401    Specimen: Blood Updated: 09/30/24 0606    Narrative:      The following orders were created for panel order CBC & Differential.  Procedure                               Abnormality         Status                     ---------                               -----------         ------                     CBC Auto Differential[[id number]]        Abnormal            Final result               Scan Slide[[id number]]                                       Final result                 Please view results for these tests on the individual orders.    CBC Auto Differential [[id number]]  (Abnormal) Collected: 09/30/24 0401    Specimen: Blood Updated: 09/30/24 0606     WBC 5.47 10*3/mm3      RBC 2.51 10*6/mm3      Hemoglobin 7.0 g/dL      Hematocrit 23.3 %      MCV 92.8 fL      MCH 27.9 pg      MCHC 30.0 g/dL      RDW 18.6 %      RDW-SD 62.6 fl      MPV 9.9 fL      Platelets 280 10*3/mm3     Narrative:      The previously reported component NRBC is no longer being reported. Previous result was 1.8 /100 WBC (Reference Range: 0.0-0.2 /100 WBC) on 9/30/2024 at 0534 EDT.    Scan Slide [[id number]] Collected: 09/30/24 0401    Specimen:  Blood Updated: 09/30/24 0606     Scan Slide --     Comment: See Manual Differential Results       Manual Differential [[id number]]  (Abnormal) Collected: 09/30/24 0401    Specimen: Blood Updated: 09/30/24 0606     Neutrophil % 9.0 %      Lymphocyte % 37.0 %      Monocyte % 2.0 %      Bands %  17.0 %      Metamyelocyte % 1.0 %      Myelocyte % 2.0 %      Atypical Lymphocyte % 3.0 %      Blasts % 29.0 %      Neutrophils Absolute 1.42 10*3/mm3      Lymphocytes Absolute 2.19 10*3/mm3      Monocytes Absolute 0.11 10*3/mm3      Anisocytosis Slight/1+     WBC Morphology Normal     Platelet Estimate Adequate    Narrative:      Reviewed by Pathologist within the past 30 days on 9/26/24 .      Comprehensive Metabolic Panel [[id number]]  (Abnormal) Collected: 09/30/24 0401    Specimen: Blood Updated: 09/30/24 0554     Glucose 163 mg/dL      BUN 37 mg/dL      Creatinine 1.17 mg/dL      Sodium 140 mmol/L      Potassium 4.8 mmol/L      Chloride 106 mmol/L      CO2 22.4 mmol/L      Calcium 9.1 mg/dL      Total Protein 7.5 g/dL      Albumin 3.6 g/dL      ALT (SGPT) 35 U/L      AST (SGOT) 30 U/L      Alkaline Phosphatase 422 U/L      Total Bilirubin 1.0 mg/dL      Globulin 3.9 gm/dL      A/G Ratio 0.9 g/dL      BUN/Creatinine Ratio 31.6     Anion Gap 11.6 mmol/L      eGFR 57.7 mL/min/1.73     Narrative:      GFR Normal >60  Chronic Kidney Disease <60  Kidney Failure <15      POC Glucose Once [[id number]]  (Abnormal) Collected: 09/29/24 2041    Specimen: Blood Updated: 09/29/24 2043     Glucose 115 mg/dL      Comment: Serial Number: [id number]:  [id number]       Basic Metabolic Panel [[id number]]  (Abnormal) Collected: 09/29/24 1810    Specimen: Blood from Arm, Right Updated: 09/29/24 1841     Glucose 162 mg/dL      BUN 38 mg/dL      Creatinine 1.12 mg/dL      Sodium 141 mmol/L      Potassium 4.8 mmol/L      Chloride 106 mmol/L      CO2 22.6 mmol/L      Calcium 9.0 mg/dL      BUN/Creatinine Ratio 33.9     Anion Gap 12.4 mmol/L       eGFR 60.8 mL/min/1.73     Narrative:      GFR Normal >60  Chronic Kidney Disease <60  Kidney Failure <15      Blood Culture - Blood, Arm, Left [[id number]]  (Normal) Collected: 09/27/24 1815    Specimen: Blood from Arm, Left Updated: 09/29/24 1831     Blood Culture No growth at 2 days    Narrative:      Less than seven (7) mL's of blood was collected.  Insufficient quantity may yield false negative results.    POC Glucose Once [[id number]]  (Abnormal) Collected: 09/29/24 1549    Specimen: Blood Updated: 09/29/24 1551     Glucose 149 mg/dL      Comment: Serial Number: [id number]:  [id number]       POC Glucose 4x Daily Before Meals & at Bedtime [[id number]]  (Normal) Collected: 09/29/24 1217    Specimen: Blood Updated: 09/29/24 1219     Glucose 81 mg/dL      Comment: Serial Number: [id number]:  [id number]       Blood Culture ID, PCR - Blood, Arm, Left [[id number]]  (Abnormal) Collected: 09/27/24 1724    Specimen: Blood from Arm, Left Updated: 09/29/24 0754     BCID, PCR Streptococcus spp, not A, B, or pneumoniae. Identification by BCID2 PCR.     BOTTLE TYPE Anaerobic Bottle    Blood Culture - Blood, Arm, Left [[id number]]  (Abnormal) Collected: 09/27/24 1724    Specimen: Blood from Arm, Left Updated: 09/29/24 0753     Blood Culture Abnormal Stain     Gram Stain Anaerobic Bottle Gram positive cocci in chains    POC Glucose 4x Daily Before Meals & at Bedtime [[id number]]  (Abnormal) Collected: 09/29/24 0718    Specimen: Blood Updated: 09/29/24 0719     Glucose 109 mg/dL      Comment: Serial Number: [id number]:  [id number]               Imaging Results (Last 24 Hours)       ** No results found for the last 24 hours. **            LAB RESULTS (LAST 7 DAYS)    CBC  Results from last 7 days   Lab Units 09/30/24  0401 09/28/24  0511 09/27/24  1724 09/26/24  1347 09/26/24  1119 09/23/24  1410   WBC 10*3/mm3 5.47 4.30 3.94 4.04 3.40 2.47*   RBC 10*6/mm3 2.51* 2.66* 2.68* 2.64* 2.60* 2.72*   HEMOGLOBIN g/dL 7.0*  7.5* 7.7* 7.6* 7.4* 7.8*   HEMATOCRIT % 23.3* 24.8* 24.5* 25.2* 24.3* 25.4*   MCV fL 92.8 93.2 91.4 95.5 93.5 93.4   PLATELETS 10*3/mm3 280 422 448 450 469* 517*       BMP  Results from last 7 days   Lab Units 09/30/24  0401 09/29/24 1810 09/28/24  0511 09/27/24  1724   SODIUM mmol/L 140 141 140 139   POTASSIUM mmol/L 4.8 4.8 5.3* 4.8   CHLORIDE mmol/L 106 106 106 104   CO2 mmol/L 22.4 22.6 23.0 22.9   BUN mg/dL 37* 38* 31* 34*   CREATININE mg/dL 1.17* 1.12* 1.13* 1.29*   GLUCOSE mg/dL 163* 162* 91 122*   MAGNESIUM mg/dL  --   --  2.4  --    PHOSPHORUS mg/dL  --   --  4.0  --        CMP   Results from last 7 days   Lab Units 09/30/24 0401 09/29/24 1810 09/28/24 0511 09/27/24  1724   SODIUM mmol/L 140 141 140 139   POTASSIUM mmol/L 4.8 4.8 5.3* 4.8   CHLORIDE mmol/L 106 106 106 104   CO2 mmol/L 22.4 22.6 23.0 22.9   BUN mg/dL 37* 38* 31* 34*   CREATININE mg/dL 1.17* 1.12* 1.13* 1.29*   GLUCOSE mg/dL 163* 162* 91 122*   ALBUMIN g/dL 3.6  --  3.9 4.0   BILIRUBIN mg/dL 1.0  --  1.3* 1.8*   ALK PHOS U/L 422*  --  326* 317*   AST (SGOT) U/L 30  --  30 28   ALT (SGPT) U/L 35*  --  24 24       BNP        TROPONIN  Results from last 7 days   Lab Units 09/28/24  0745   HSTROP T ng/L 17*       CoAg  Results from last 7 days   Lab Units 09/27/24  1724   INR  1.06   APTT seconds 34.6*       Creatinine Clearance  Estimated Creatinine Clearance: 55 mL/min (A) (by C-G formula based on SCr of 1.17 mg/dL (H)).    ABG        Radiology  No radiology results for the last day      EKG  I personally viewed and interpreted the patient's EKG/Telemetry data:  ECG 12 Lead Tachycardia   Preliminary Result   HEART XQLN=025  bpm   RR Lhazdufi=029  ms   ID Hoyjzqtr=679  ms   P Horizontal Axis=-69  deg   P Front Axis=23  deg   QRSD Interval=89  ms   QT Wbjcwuou=913  ms   PHqJ=002  ms   QRS Axis=-9  deg   T Wave Axis=29  deg   - BORDERLINE ECG -   Sinus tachycardia   Low voltage, extremity leads   Consider anterior infarct   Date and Time of  Study:2024-09-30 00:46:19      ECG 12 Lead Dyspnea   Preliminary Result   HEART OIJZ=580  bpm   RR Mviedqzz=845  ms   DE Uimvnhgq=914  ms   P Horizontal Axis=7  deg   P Front Axis=54  deg   QRSD Interval=90  ms   QT Ugdugcoi=321  ms   NScR=381  ms   QRS Axis=49  deg   T Wave Axis=39  deg   - OTHERWISE NORMAL ECG -   Sinus tachycardia   Low voltage, extremity leads   Date and Time of Study:2024-09-27 16:53:24      Telemetry Scan   Final Result      Telemetry Scan   Final Result      Telemetry Scan   Final Result      Telemetry Scan   Final Result      Telemetry Scan   Final Result      Telemetry Scan   Final Result      Telemetry Scan   Final Result      Telemetry Scan   Final Result      Telemetry Scan   Final Result      Telemetry Scan   Final Result      Telemetry Scan   Final Result      Telemetry Scan   Final Result      Telemetry Scan   Final Result            Echocardiogram:    Results for orders placed during the hospital encounter of 03/19/24    Adult Transthoracic Echo Complete W/ Cont if Necessary Per Protocol    Interpretation Summary    Left ventricular systolic function is normal. Calculated left ventricular EF = 62%    Left ventricular diastolic function was normal.    The right ventricular cavity is mildly dilated.    Estimated right ventricular systolic pressure from tricuspid regurgitation is mildly elevated (35-45 mmHg).    The inferior vena cava is normally sized. Normal IVC inspiratory collapse of greater than 50% noted.        Stress Test:         Cardiac Catheterization:  No results found for this or any previous visit.         Other:         ASSESSMENT & PLAN:    Principal Problem:    Acute hypoxemic respiratory failure  Active Problems:    CML (chronic myelocytic leukemia)    History of pulmonary embolism    Medically noncompliant    Type 2 diabetes mellitus with diabetic polyneuropathy, with long-term current use of insulin    Sinus tachycardia    NICM (nonischemic cardiomyopathy)    Anemia  due to chemotherapy    Acute on chronic HFrEF (heart failure with reduced ejection fraction)    Acute on chronic HFrEF (heart failure with reduced ejection fraction) secondary to NICM (nonischemic cardiomyopathy)  Bilateral lower extremity edema  Previous EF 26-30% in Nov. 2022.  Repeat echocardiogram in March 2024 showed improvement in LV function with EF of 62%  Patient has been noncompliant with medications and outpatient follow up  High-sensitivity troponin of 17 and 22, proBNP of 4000  Previously proBNP was 12,000  Continue diuretics  Monitor I's and O's and replace electrolytes as needed  Closely monitor renal function  Continue losartan, Toprol-XL and Jardiance  Emphasized importance of compliance with medications and follow-up  Low-salt diet discussed with the patient     Acute respiratory failure with hypoxia  Likely multifactorial, secondary to pneumonia, CHF, and anemia   Started on antibiotics  Management per primary  Currently on 5 L of oxygen via nasal cannula.  Wean as tolerated     Anemia due to chemotherapy  H&H 7/23.3  Transfused today     CML (chronic myelocytic leukemia)  On chemotherapy, followed by oncology      Medically noncompliant  Compliance encouraged  / consulted   HF Nurse Navigator consulted as above     Type 2 diabetes mellitus with diabetic polyneuropathy, with long-term current use of insulin  A1c of 8.2  On Lantus and SSI     Sinus tachycardia  Due to underlying infection, anemia and hypoxia  Expected to improve with transfusion  Will need additional diuretics with blood transfusion today  Treat underlying causes  Increasing metoprolol today      Rohan Jackson MD  09/30/24  06:09 EDT

## 2024-09-30 NOTE — CASE MANAGEMENT/SOCIAL WORK
Discharge Planning Assessment   Mt     Patient Name: Nieves Mc  MRN: [id number]  Today's Date: 9/30/2024    Admit Date: 9/27/2024    Plan: Home with home health care. Ref to [facility] (pending), order needed.   Discharge Needs Assessment       Row Name 09/30/24 1311       Living Environment    People in Home grandchild(tobin);spouse    Name(s) of People in Home Ron-spouse and grandson    Current Living Arrangements home    Potentially Unsafe Housing Conditions none    In the past 12 months has the electric, gas, oil, or water company threatened to shut off services in your home? No    Primary Care Provided by other (see comments)  Purpose health caregiving    Provides Primary Care For no one    Family Caregiver if Needed none    Quality of Family Relationships unable to assess    Able to Return to Prior Arrangements yes       Resource/Environmental Concerns    Resource/Environmental Concerns none    Transportation Concerns none       Transportation Needs    In the past 12 months, has lack of transportation kept you from medical appointments or from getting medications? no    In the past 12 months, has lack of transportation kept you from meetings, work, or from getting things needed for daily living? No       Food Insecurity    Within the past 12 months, you worried that your food would run out before you got the money to buy more. Never true    Within the past 12 months, the food you bought just didn't last and you didn't have money to get more. Never true       Transition Planning    Patient/Family Anticipates Transition to home with help/services    Patient/Family Anticipated Services at Transition home health care    Transportation Anticipated family or friend will provide       Discharge Needs Assessment    Readmission Within the Last 30 Days no previous admission in last 30 days    Current Outpatient/Agency/Support Group homecare agency    Equipment Currently Used at Home walker,  standard;commode;hospital bed;glucometer;wheelchair;shower chair    Concerns to be Addressed discharge planning    Anticipated Changes Related to Illness none    Equipment Needed After Discharge none    Outpatient/Agency/Support Group Needs homecare agency    Discharge Facility/Level of Care Needs home with home health    Provided Post Acute Provider List? Yes    Post Acute Provider List Home Health    Provided Post Acute Provider Quality & Resource List? Yes    Post Acute Provider Quality and Resource List Home Health    Delivered To Patient    Method of Delivery In person    Patient's Choice of Community Agency(s) [facility]                   Discharge Plan       Row Name 09/30/24 0186       Plan    Plan Home with home health care. Ref to [facility] (pending), order needed.    Plan Comments CM met with patient at bedside. Confirmed PCP, insurance, and pharmacy. Meds to bed denied, patient denies any difficulty affording medications. Patient is current with [employer] for caregiving, denies any outpatient physical or occupational services. DC Barriers: Cardiology and diabetes educator following. IV cefepime, doxycline, lasix and receiving a blood transfusion. Requiring 3L 02                  Continued Care and Services - Admitted Since 9/27/2024    No active coordination exists for this encounter.       Selected Continued Care - Episodes Includes continued care and service providers with selected services from the active episodes listed below      Oncology- External Fill Episode start date: 3/15/2023   There are no active outsourced providers for this episode.                 Expected Discharge Date and Time       Expected Discharge Date Expected Discharge Time    Oct 2, 2024            Demographic Summary       Row Name 09/30/24 3519       General Information    Admission Type inpatient    Arrived From emergency department    Required Notices Provided Important Message from Medicare    Referral Source admission list     Reason for Consult discharge planning    Preferred Language English       Contact Information    Permission Granted to Share Info With     Contact Information Obtained for                    Functional Status       Row Name 09/30/24 1308       Functional Status    Usual Activity Tolerance poor    Current Activity Tolerance poor       Physical Activity    On average, how many days per week do you engage in moderate to strenuous exercise (like a brisk walk)? 0 days    On average, how many minutes do you engage in exercise at this level? 0 min    Number of minutes of exercise per week 0       Assessment of Health Literacy    How often do you have someone help you read hospital materials? Sometimes    How often do you have problems learning about your medical condition because of difficulty understanding written information? Sometimes    How often do you have a problem understanding what is told to you about your medical condition? Sometimes    How confident are you filling out medical forms by yourself? Somewhat       Functional Status, IADL    Medications assistive person    Meal Preparation assistive person    Housekeeping assistive person    Laundry assistive person    Shopping assistive person    IADL Comments Caregiving provided through [facility]       Mental Status    General Appearance WDL WDL       Mental Status Summary    Recent Changes in Mental Status/Cognitive Functioning no changes             Beulah Reina RN     Office: [phone]

## 2024-09-30 NOTE — CASE MANAGEMENT/SOCIAL WORK
Continued Stay Note   Mt     Patient Name: Nieves Mc  MRN: [id number]  Today's Date: 9/30/2024    Admit Date: 9/27/2024    Plan: Home with home health care. Ref to [facility] (pending), order needed.   Discharge Plan       Row Name 09/30/24 1316       Plan    Plan Home with home health care. Ref to [facility] (pending), order needed.    Plan Comments CM met with patient at bedside. Confirmed PCP, insurance, and pharmacy. Meds to bed denied, patient denies any difficulty affording medications. Patient is current with "[employer] for caregiving, denies any outpatient physical or occupational services. DC Barriers: Cardiology and diabetes educator following. IV cefepime, doxycline, lasix and receiving a blood transfusion. Requiring 3L 02    Communicated with [facility] Evelyn liajered for new referral. Referral placed in epic basket.                 Expected Discharge Date and Time       Expected Discharge Date Expected Discharge Time    Oct 2, 2024           Beulah Reina RN     Office: [phone]

## 2024-09-30 NOTE — CONSULTS
"Diabetes Education  Assessment/Teaching    Patient Name:  Nieves Mc  YOB: 1975  MRN: [id number]  Admit Date:  9/27/2024      Assessment Date:  9/30/2024  Flowsheet Row Most Recent Value   General Information     Referral From: Database, A1c  [MD consult per database and on 7/9/2024 A1c was 8.2%.]   Height 162.6 cm (64\")   Height Method Stated   Weight 66.2 kg (146 lb)   Pregnancy Assessment    Diabetes History    What type of diabetes do you have? Type 2   Length of Diabetes Diagnosis 6 - 10 years  [Diagnosed 2018]   Current DM knowledge fair   Have you had diabetes education/teaching in the past? yes   When and where was your diabetes education? Inpatient hospitalization at [facility] on 3/20/2024   Do you test your blood sugar at home? yes   Frequency of checks as often as needs   Meter type Freestyle Zahira   Who performs the test? patient   Typical readings 80s   Education Preferences    What areas of diabetes would you like to learn about? diabetes complications, testing my blood sugar at home   Nutrition Information    Assessment Topics    Reducing Risk - Assessment Needs education   Monitoring - Assessment Needs education   DM Goals    Reducing Risk - Goal Today   Monitoring - Goal Today            Flowsheet Row Most Recent Value   DM Education Needs    Meter Has own   Meter Type Freestyle   Medication Insulin, Pen  [At home patient stated that she takes Lantus 23 units at bedtime and Humalog sliding scale before meals.]   Reducing Risks A1C testing  [On 7/9/2024 A1c was 8.2%.]   Discharge Plan Home   Motivation Moderate   Teaching Method Discussion, Handouts   Patient Response Verbalized understanding              Other Comments:  A1c info sheet given with discussion on A1c target and healthy blood sugar range. Asked patient if she has a glucometer at home to check her blood sugar when Zahira sensors are reading high or low and she said no.  Prescriptions started in discharge orders for lancets, " test strips, and Accu-chek Guide Me glucometer. Patient has no further questions or concerns related to diabetes at this time.        Electronically signed by:  Micaela Melvin RN  09/30/24 11:44 EDT

## 2024-10-01 ENCOUNTER — APPOINTMENT (OUTPATIENT)
Dept: GENERAL RADIOLOGY | Facility: HOSPITAL | Age: 49
DRG: 193 | End: 2024-10-01
Payer: MEDICARE

## 2024-10-01 ENCOUNTER — DOCUMENTATION (OUTPATIENT)
Dept: ONCOLOGY | Facility: CLINIC | Age: 49
End: 2024-10-01
Payer: MEDICARE

## 2024-10-01 LAB
ALBUMIN SERPL-MCNC: 3.6 G/DL (ref 3.5–5.2)
ALBUMIN/GLOB SERPL: 1 G/DL
ALP SERPL-CCNC: 431 U/L (ref 39–117)
ALT SERPL W P-5'-P-CCNC: 25 U/L (ref 1–33)
ANION GAP SERPL CALCULATED.3IONS-SCNC: 10 MMOL/L (ref 5–15)
AST SERPL-CCNC: 21 U/L (ref 1–32)
BASOPHILS # BLD MANUAL: 0.15 10*3/MM3 (ref 0–0.2)
BASOPHILS NFR BLD MANUAL: 2 % (ref 0–1.5)
BILIRUB SERPL-MCNC: 0.7 MG/DL (ref 0–1.2)
BLASTS NFR BLD MANUAL: 28 % (ref 0–0)
BUN SERPL-MCNC: 39 MG/DL (ref 6–20)
BUN/CREAT SERPL: 34.8 (ref 7–25)
CALCIUM SPEC-SCNC: 9 MG/DL (ref 8.6–10.5)
CHLORIDE SERPL-SCNC: 107 MMOL/L (ref 98–107)
CO2 SERPL-SCNC: 22 MMOL/L (ref 22–29)
CREAT SERPL-MCNC: 1.12 MG/DL (ref 0.57–1)
DACRYOCYTES BLD QL SMEAR: ABNORMAL
DEPRECATED RDW RBC AUTO: 62.3 FL (ref 37–54)
EGFRCR SERPLBLD CKD-EPI 2021: 60.8 ML/MIN/1.73
EOSINOPHIL # BLD MANUAL: 0.29 10*3/MM3 (ref 0–0.4)
EOSINOPHIL NFR BLD MANUAL: 4 % (ref 0.3–6.2)
ERYTHROCYTE [DISTWIDTH] IN BLOOD BY AUTOMATED COUNT: 18.5 % (ref 12.3–15.4)
GLOBULIN UR ELPH-MCNC: 3.7 GM/DL
GLUCOSE BLDC GLUCOMTR-MCNC: 160 MG/DL (ref 70–105)
GLUCOSE BLDC GLUCOMTR-MCNC: 177 MG/DL (ref 70–105)
GLUCOSE BLDC GLUCOMTR-MCNC: 243 MG/DL (ref 70–105)
GLUCOSE SERPL-MCNC: 131 MG/DL (ref 65–99)
HCT VFR BLD AUTO: 23.1 % (ref 34–46.6)
HCT VFR BLD AUTO: 26 % (ref 34–46.6)
HGB BLD-MCNC: 6.9 G/DL (ref 12–15.9)
HGB BLD-MCNC: 8 G/DL (ref 12–15.9)
LARGE PLATELETS: ABNORMAL
LYMPHOCYTES # BLD MANUAL: 2.63 10*3/MM3 (ref 0.7–3.1)
MCH RBC QN AUTO: 27.6 PG (ref 26.6–33)
MCHC RBC AUTO-ENTMCNC: 29.9 G/DL (ref 31.5–35.7)
MCV RBC AUTO: 92.4 FL (ref 79–97)
METAMYELOCYTES NFR BLD MANUAL: 13 % (ref 0–0)
MYELOCYTES NFR BLD MANUAL: 2 % (ref 0–0)
NEUTROPHILS # BLD AUTO: 1.02 10*3/MM3 (ref 1.7–7)
NEUTROPHILS NFR BLD MANUAL: 11 % (ref 42.7–76)
NEUTS BAND NFR BLD MANUAL: 3 % (ref 0–5)
NRBC SPEC MANUAL: 3 /100 WBC (ref 0–0.2)
PLATELET # BLD AUTO: 219 10*3/MM3 (ref 140–450)
PMV BLD AUTO: 10 FL (ref 6–12)
POIKILOCYTOSIS BLD QL SMEAR: ABNORMAL
POLYCHROMASIA BLD QL SMEAR: ABNORMAL
POTASSIUM SERPL-SCNC: 4.8 MMOL/L (ref 3.5–5.2)
PROMYELOCYTES NFR BLD MANUAL: 1 % (ref 0–0)
PROT SERPL-MCNC: 7.3 G/DL (ref 6–8.5)
RBC # BLD AUTO: 2.5 10*6/MM3 (ref 3.77–5.28)
SCAN SLIDE: NORMAL
SMALL PLATELETS BLD QL SMEAR: ADEQUATE
SODIUM SERPL-SCNC: 139 MMOL/L (ref 136–145)
VARIANT LYMPHS NFR BLD MANUAL: 2 % (ref 0–5)
VARIANT LYMPHS NFR BLD MANUAL: 34 % (ref 19.6–45.3)
WBC MORPH BLD: NORMAL
WBC NRBC COR # BLD AUTO: 7.3 10*3/MM3 (ref 3.4–10.8)

## 2024-10-01 PROCEDURE — 86900 BLOOD TYPING SEROLOGIC ABO: CPT

## 2024-10-01 PROCEDURE — 80053 COMPREHEN METABOLIC PANEL: CPT | Performed by: NURSE PRACTITIONER

## 2024-10-01 PROCEDURE — 71045 X-RAY EXAM CHEST 1 VIEW: CPT

## 2024-10-01 PROCEDURE — 85014 HEMATOCRIT: CPT | Performed by: STUDENT IN AN ORGANIZED HEALTH CARE EDUCATION/TRAINING PROGRAM

## 2024-10-01 PROCEDURE — 36430 TRANSFUSION BLD/BLD COMPNT: CPT

## 2024-10-01 PROCEDURE — 63710000001 INSULIN GLARGINE PER 5 UNITS: Performed by: STUDENT IN AN ORGANIZED HEALTH CARE EDUCATION/TRAINING PROGRAM

## 2024-10-01 PROCEDURE — 25010000002 FUROSEMIDE PER 20 MG: Performed by: STUDENT IN AN ORGANIZED HEALTH CARE EDUCATION/TRAINING PROGRAM

## 2024-10-01 PROCEDURE — 85025 COMPLETE CBC W/AUTO DIFF WBC: CPT | Performed by: NURSE PRACTITIONER

## 2024-10-01 PROCEDURE — 85007 BL SMEAR W/DIFF WBC COUNT: CPT | Performed by: NURSE PRACTITIONER

## 2024-10-01 PROCEDURE — 63710000001 DIPHENHYDRAMINE PER 50 MG: Performed by: INTERNAL MEDICINE

## 2024-10-01 PROCEDURE — 99233 SBSQ HOSP IP/OBS HIGH 50: CPT | Performed by: INTERNAL MEDICINE

## 2024-10-01 PROCEDURE — 82948 REAGENT STRIP/BLOOD GLUCOSE: CPT

## 2024-10-01 PROCEDURE — 25010000002 CEFEPIME PER 500 MG: Performed by: STUDENT IN AN ORGANIZED HEALTH CARE EDUCATION/TRAINING PROGRAM

## 2024-10-01 PROCEDURE — 25010000002 FUROSEMIDE PER 20 MG: Performed by: INTERNAL MEDICINE

## 2024-10-01 PROCEDURE — 85018 HEMOGLOBIN: CPT | Performed by: STUDENT IN AN ORGANIZED HEALTH CARE EDUCATION/TRAINING PROGRAM

## 2024-10-01 PROCEDURE — P9040 RBC LEUKOREDUCED IRRADIATED: HCPCS

## 2024-10-01 PROCEDURE — 82948 REAGENT STRIP/BLOOD GLUCOSE: CPT | Performed by: STUDENT IN AN ORGANIZED HEALTH CARE EDUCATION/TRAINING PROGRAM

## 2024-10-01 PROCEDURE — 63710000001 INSULIN LISPRO (HUMAN) PER 5 UNITS: Performed by: STUDENT IN AN ORGANIZED HEALTH CARE EDUCATION/TRAINING PROGRAM

## 2024-10-01 RX ORDER — FUROSEMIDE 10 MG/ML
40 INJECTION INTRAMUSCULAR; INTRAVENOUS 2 TIMES DAILY
Status: DISCONTINUED | OUTPATIENT
Start: 2024-10-01 | End: 2024-10-08

## 2024-10-01 RX ADMIN — Medication 10 ML: at 08:45

## 2024-10-01 RX ADMIN — METOPROLOL SUCCINATE 50 MG: 50 TABLET, EXTENDED RELEASE ORAL at 08:45

## 2024-10-01 RX ADMIN — INSULIN LISPRO 2 UNITS: 100 INJECTION, SOLUTION INTRAVENOUS; SUBCUTANEOUS at 11:00

## 2024-10-01 RX ADMIN — CEFEPIME 2000 MG: 2 INJECTION, POWDER, FOR SOLUTION INTRAVENOUS at 06:15

## 2024-10-01 RX ADMIN — GABAPENTIN 800 MG: 400 CAPSULE ORAL at 15:04

## 2024-10-01 RX ADMIN — FUROSEMIDE 20 MG: 10 INJECTION, SOLUTION INTRAMUSCULAR; INTRAVENOUS at 06:14

## 2024-10-01 RX ADMIN — LOSARTAN POTASSIUM 12.5 MG: 25 TABLET, FILM COATED ORAL at 08:45

## 2024-10-01 RX ADMIN — GABAPENTIN 800 MG: 400 CAPSULE ORAL at 06:14

## 2024-10-01 RX ADMIN — DIPHENHYDRAMINE HYDROCHLORIDE 25 MG: 25 CAPSULE ORAL at 11:12

## 2024-10-01 RX ADMIN — TIZANIDINE 4 MG: 4 TABLET ORAL at 08:45

## 2024-10-01 RX ADMIN — ACETAMINOPHEN 650 MG: 325 TABLET, FILM COATED ORAL at 11:08

## 2024-10-01 RX ADMIN — ALPRAZOLAM 0.5 MG: 0.5 TABLET ORAL at 21:11

## 2024-10-01 RX ADMIN — MIRTAZAPINE 15 MG: 15 TABLET, FILM COATED ORAL at 21:11

## 2024-10-01 RX ADMIN — METOPROLOL SUCCINATE 50 MG: 50 TABLET, EXTENDED RELEASE ORAL at 21:11

## 2024-10-01 RX ADMIN — DOXYCYCLINE 100 MG: 100 INJECTION, POWDER, LYOPHILIZED, FOR SOLUTION INTRAVENOUS at 10:57

## 2024-10-01 RX ADMIN — GABAPENTIN 800 MG: 400 CAPSULE ORAL at 21:11

## 2024-10-01 RX ADMIN — INSULIN LISPRO 7 UNITS: 100 INJECTION, SOLUTION INTRAVENOUS; SUBCUTANEOUS at 18:01

## 2024-10-01 RX ADMIN — FUROSEMIDE 20 MG: 10 INJECTION, SOLUTION INTRAMUSCULAR; INTRAVENOUS at 16:23

## 2024-10-01 RX ADMIN — INSULIN LISPRO 7 UNITS: 100 INJECTION, SOLUTION INTRAVENOUS; SUBCUTANEOUS at 10:58

## 2024-10-01 RX ADMIN — CEFEPIME 2000 MG: 2 INJECTION, POWDER, FOR SOLUTION INTRAVENOUS at 17:18

## 2024-10-01 RX ADMIN — INSULIN LISPRO 3 UNITS: 100 INJECTION, SOLUTION INTRAVENOUS; SUBCUTANEOUS at 18:02

## 2024-10-01 RX ADMIN — INSULIN GLARGINE 28 UNITS: 100 INJECTION, SOLUTION SUBCUTANEOUS at 21:18

## 2024-10-01 RX ADMIN — RIVAROXABAN 10 MG: 20 TABLET, FILM COATED ORAL at 08:45

## 2024-10-01 RX ADMIN — Medication 10 ML: at 21:11

## 2024-10-01 NOTE — PROGRESS NOTES
Referring Provider: Gilda Hernandez,*    Reason for follow-up: HFrEF, tachycardia     Patient Care Team:  Pankaj Stover MD as PCP - General (Internal Medicine)  Meghann Lerma MD as Consulting Physician (Hematology and Oncology)  Hamida Feldman MD as Consulting Physician (Cardiology)  Rohan Jackson MD as Cardiologist (Cardiology)      SUBJECTIVE  Laying comfortably in bed.  Remains tachycardic and hypoxic.  Now on 4 L of oxygen     ROS  Review of all systems negative except as indicated.    Since I have last seen, the patient has been without any chest discomfort, shortness of breath, palpitations, dizziness or syncope.  Denies having any headache, abdominal pain, nausea, vomiting, diarrhea, constipation, loss of weight or loss of appetite.  Denies having any excessive bruising, hematuria or blood in the stool.        Personal History:    Past Medical History:   Diagnosis Date    Acute respiratory failure with hypoxia 07/28/2022    Adverse effect of chemotherapy 11/08/2023    Bilateral subdural hematomas 05/18/2023    Bone pain     Chronic constipation 07/07/2023    CML (chronic myelocytic leukemia) 04/01/2018    COVID-19 virus infection 01/12/2022    Diabetes mellitus     Diabetic gastroparesis 07/07/2023    Extremity pain     Carlin. legs pain    Fall during current hospitalization 06/25/2023    Leg pain     left leg greater    Medically noncompliant 03/11/2021    Migraine     Petechial rash 11/08/2023    Pubic ramus fracture, left, closed, initial encounter 06/25/2023    Pulmonary embolism     Traumatic subdural hemorrhage without loss of consciousness 05/17/2023    Tumor lysis syndrome 07/05/2022    UTI (urinary tract infection) 07/06/2023    Vision loss     doing surgery       Past Surgical History:   Procedure Laterality Date    BONE MARROW BIOPSY      BREAST SURGERY      BRONCHOSCOPY N/A 6/6/2022    Procedure: BRONCHOSCOPY bilateral lung washing;  Surgeon: Charlene Camara MD;  Location:   [facility];  Service: Pulmonary;  Laterality: N/A;  post: rule out infection vs transfusion lung injury     SECTION      CHOLECYSTECTOMY      COLONOSCOPY N/A 2023    Procedure: COLONOSCOPY;  Surgeon: Freddy Villeda MD;  Location:  [facility];  Service: Gastroenterology;  Laterality: N/A;  poor prep    ENDOSCOPY N/A 2023    Procedure: ESOPHAGOGASTRODUODENOSCOPY with biopsy x2 areas;  Surgeon: Freddy Villeda MD;  Location: [facility];  Service: Gastroenterology;  Laterality: N/A;  food in stomach;abnormal duodenal mucosa    EYE SURGERY      laser surgery due  to hemmorage--- 2021-- another surgery  lt eye11/15/21    RETINAL DETACHMENT SURGERY      SPINE SURGERY      Lombardi spinal block    TUBAL ABDOMINAL LIGATION         Family History   Problem Relation Age of Onset    Diabetes Mother     Diabetes Maternal Grandmother     Heart attack Maternal Grandmother     Stroke Maternal Grandmother        Social History     Tobacco Use    Smoking status: Never    Smokeless tobacco: Never   Vaping Use    Vaping status: Never Used   Substance Use Topics    Alcohol use: No    Drug use: No        Home meds:  Prior to Admission medications    Medication Sig Start Date End Date Taking? Authorizing Provider   acetaminophen (TYLENOL) 325 MG tablet Take 2 tablets by mouth Every 4 (Four) Hours As Needed for Moderate Pain. Indications: Fever, Pain 24  Yes Meghann Lerma MD   ALPRAZolam (Xanax) 0.5 MG tablet Take 1 tablet by mouth At Night As Needed for Anxiety. Indications: Feeling Anxious 24  Yes Denisha Gill APRN   Diclofenac Sodium (VOLTAREN) 1 % gel gel Apply 4 g topically to the appropriate area as directed 4 (Four) Times a Day As Needed (hip pain). 24  Yes Denisha Gill APRN   doxycycline (VIBRAMYICN) 100 MG tablet Take 1 tablet by mouth 2 (Two) Times a Day for 10 days. 9/26/24 10/6/24 Yes Meghann Lerma MD   furosemide (LASIX) 40 MG tablet  Take 1 tablet by mouth Daily. Indications: Edema 9/19/24  Yes Pankaj Stover MD   gabapentin (Neurontin) 800 MG tablet Take 1 tablet by mouth 3 (Three) Times a Day. Ordered through [facility]  Indications: Diabetes with Nerve Disease 5/15/24  Yes Pankaj Stover MD   Insulin Glargine (LANTUS SOLOSTAR) 100 UNIT/ML injection pen Inject 28 Units under the skin into the appropriate area as directed Every Night. Indications: Type 2 Diabetes 9/16/24  Yes Pankaj Stover MD   Insulin Lispro, 1 Unit Dial, (HumaLOG KwikPen) 100 UNIT/ML solution pen-injector Inject 7 Units under the skin into the appropriate area as directed 3 (Three) Times a Day Before Meals. Dx code: E11.65 9/16/24  Yes Pankaj Stover MD   levoFLOXacin (Levaquin) 500 MG tablet Take 1 tablet by mouth Daily. 9/24/24  Yes Meghann Lerma MD   mirtazapine (REMERON SOL-TAB) 15 MG disintegrating tablet Place 1 tablet on the tongue Every Night.   Yes ProviderMelecio MD   pain patient supplied pump by Intrathecal route Continuous.   Yes ProviderMelecio MD   PONATinib HCl (Iclusig) 10 MG tablet Take 10 mg by mouth Every Other Day. Take with or without food.  Swallow whole, do not crush or chew. 8/21/24  Yes Meghann Lerma MD   rivaroxaban (Xarelto) 10 MG tablet Take 1 tablet by mouth Daily. 6/11/24  Yes Denisha Gill APRN   tiZANidine (ZANAFLEX) 4 MG tablet Take 1 tablet by mouth Daily. Indications: Musculoskeletal Pain 5/15/24  Yes Pankaj Stover MD       Allergies:  Patient has no known allergies.    Scheduled Meds:acetaminophen, 650 mg, Oral, Once   Or  acetaminophen, 650 mg, Oral, Once   Or  acetaminophen, 650 mg, Rectal, Once  cefepime, 2,000 mg, Intravenous, Q12H  diphenhydrAMINE, 25 mg, Oral, Once   Or  diphenhydrAMINE, 25 mg, Intravenous, Once  doxycycline, 100 mg, Intravenous, Q12H  furosemide, 20 mg, Intravenous, BID  furosemide, 20 mg, Intravenous, Once  gabapentin, 800 mg, Oral, Q8H  insulin  "glargine, 28 Units, Subcutaneous, Nightly  insulin lispro, 2-7 Units, Subcutaneous, 4x Daily AC & at Bedtime  insulin lispro, 7 Units, Subcutaneous, TID With Meals  losartan, 12.5 mg, Oral, Q24H  metoprolol succinate XL, 50 mg, Oral, Q12H  mirtazapine, 15 mg, Oral, Nightly  rivaroxaban, 10 mg, Oral, Daily  sodium chloride, 10 mL, Intravenous, Q12H  tiZANidine, 4 mg, Oral, Daily      Continuous Infusions:pain,   Pharmacy to Dose Cefepime,       PRN Meds:.  acetaminophen **OR** acetaminophen **OR** acetaminophen    ALPRAZolam    senna-docusate sodium **AND** polyethylene glycol **AND** bisacodyl **AND** bisacodyl    Calcium Replacement - Follow Nurse / BPA Driven Protocol    dextrose    dextrose    Diclofenac Sodium    glucagon (human recombinant)    hydrALAZINE    Magnesium Standard Dose Replacement - Follow Nurse / BPA Driven Protocol    ondansetron    Pharmacy to Dose Cefepime    Phosphorus Replacement - Follow Nurse / BPA Driven Protocol    Potassium Replacement - Follow Nurse / BPA Driven Protocol    [COMPLETED] Insert Peripheral IV **AND** sodium chloride    sodium chloride    sodium chloride      OBJECTIVE    Vital Signs  Vitals:    09/30/24 1556 09/30/24 1914 09/30/24 2324 10/01/24 0353   BP: 121/81 126/70 116/70 136/81   BP Location: Left arm Left arm Right arm Right arm   Patient Position: Lying Lying Lying Sitting   Pulse: 106 110 104 (!) 137   Resp: 20 18 17 21   Temp: 98.1 °F (36.7 °C) 98.5 °F (36.9 °C) 97.6 °F (36.4 °C) 97.7 °F (36.5 °C)   TempSrc: Oral Oral Oral Oral   SpO2: 90% 91% 95% 97%   Weight:       Height:           Flowsheet Rows      Flowsheet Row First Filed Value   Admission Height 160 cm (63\") Documented at 09/27/2024 1630   Admission Weight 66.2 kg (146 lb) Documented at 09/27/2024 1630              Intake/Output Summary (Last 24 hours) at 10/1/2024 0647  Last data filed at 9/30/2024 1754  Gross per 24 hour   Intake 538 ml   Output 600 ml   Net -62 ml          Telemetry: Sinus " tachycardia    Physical Exam:  The patient is alert, oriented and in no distress.  Vital signs as noted above.  Head and neck revealed no carotid bruits or jugular venous distention.  No thyromegaly or lymphadenopathy is present  Lungs clear.  No wheezing.  Breath sounds are normal bilaterally.  Heart normal first and second heart sounds.  No murmur. No precordial rub is present.  No gallop is present.  Abdomen soft and nontender.  No organomegaly is present.  Extremities with good peripheral pulses with bilateral lower extremity edema  Skin warm and dry.  Musculoskeletal system is grossly normal.  CNS grossly normal.       Results Review:  I have personally reviewed the results from the time of this admission to 10/1/2024 06:47 EDT and agree with these findings:  []  Laboratory  []  Microbiology  []  Radiology  []  EKG/Telemetry   []  Cardiology/Vascular   []  Pathology  []  Old records  []  Other:    Most notable findings include:    Lab Results (last 24 hours)       Procedure Component Value Units Date/Time    CBC & Differential [[id number]]  (Abnormal) Collected: 10/01/24 0308    Specimen: Blood from Arm, Left Updated: 10/01/24 0354    Narrative:      The following orders were created for panel order CBC & Differential.  Procedure                               Abnormality         Status                     ---------                               -----------         ------                     CBC Auto Differential[[id number]]        Abnormal            Final result               Scan Slide[[id number]]                                       Final result                 Please view results for these tests on the individual orders.    Manual Differential [[id number]]  (Abnormal) Collected: 10/01/24 0308    Specimen: Blood from Arm, Left Updated: 10/01/24 0354     Neutrophil % 11.0 %      Lymphocyte % 34.0 %      Eosinophil % 4.0 %      Basophil % 2.0 %      Bands %  3.0 %      Metamyelocyte % 13.0 %      Myelocyte % 2.0  %      Promyelocyte % 1.0 %      Atypical Lymphocyte % 2.0 %      Blasts % 28.0 %      Neutrophils Absolute 1.02 10*3/mm3      Lymphocytes Absolute 2.63 10*3/mm3      Eosinophils Absolute 0.29 10*3/mm3      Basophils Absolute 0.15 10*3/mm3      nRBC 3.0 /100 WBC      Dacrocytes Slight/1+     Poikilocytes Slight/1+     Polychromasia Slight/1+     WBC Morphology Normal     Platelet Estimate Adequate     Large Platelets Slight/1+    Narrative:      Reviewed by Pathologist within the past 30 days on 9/26/24... jls 10/1/24 .      Scan Slide [[id number]] Collected: 10/01/24 0308    Specimen: Blood from Arm, Left Updated: 10/01/24 0354     Scan Slide --     Comment: See Manual Differential Results       CBC Auto Differential [[id number]]  (Abnormal) Collected: 10/01/24 0308    Specimen: Blood from Arm, Left Updated: 10/01/24 0354     WBC 7.30 10*3/mm3      RBC 2.50 10*6/mm3      Hemoglobin 6.9 g/dL      Hematocrit 23.1 %      MCV 92.4 fL      MCH 27.6 pg      MCHC 29.9 g/dL      RDW 18.5 %      RDW-SD 62.3 fl      MPV 10.0 fL      Platelets 219 10*3/mm3     Narrative:      The previously reported component NRBC is no longer being reported. Previous result was 1.2 /100 WBC (Reference Range: 0.0-0.2 /100 WBC) on 10/1/2024 at 0332 EDT.    Comprehensive Metabolic Panel [[id number]]  (Abnormal) Collected: 10/01/24 0308    Specimen: Blood from Arm, Left Updated: 10/01/24 0350     Glucose 131 mg/dL      BUN 39 mg/dL      Creatinine 1.12 mg/dL      Sodium 139 mmol/L      Potassium 4.8 mmol/L      Chloride 107 mmol/L      CO2 22.0 mmol/L      Calcium 9.0 mg/dL      Total Protein 7.3 g/dL      Albumin 3.6 g/dL      ALT (SGPT) 25 U/L      AST (SGOT) 21 U/L      Alkaline Phosphatase 431 U/L      Total Bilirubin 0.7 mg/dL      Globulin 3.7 gm/dL      A/G Ratio 1.0 g/dL      BUN/Creatinine Ratio 34.8     Anion Gap 10.0 mmol/L      eGFR 60.8 mL/min/1.73     Narrative:      GFR Normal >60  Chronic Kidney Disease <60  Kidney Failure <15       POC Glucose Once [[id number]]  (Abnormal) Collected: 09/30/24 2058    Specimen: Blood Updated: 09/30/24 2104     Glucose 114 mg/dL      Comment: Serial Number: [id number]:  [id number]       Blood Culture - Blood, Arm, Left [[id number]]  (Normal) Collected: 09/27/24 1815    Specimen: Blood from Arm, Left Updated: 09/30/24 1831     Blood Culture No growth at 3 days    Narrative:      Less than seven (7) mL's of blood was collected.  Insufficient quantity may yield false negative results.    POC Glucose 4x Daily Before Meals & at Bedtime [[id number]]  (Abnormal) Collected: 09/30/24 1741    Specimen: Blood Updated: 09/30/24 1742     Glucose 135 mg/dL      Comment: Serial Number: [id number]:  [id number]       Mycoplasma Pneumoniae Antibody, IgM - Blood, Arm, Left [[id number]] Collected: 09/28/24 0511    Specimen: Blood from Arm, Left Updated: 09/30/24 1611     M pneumoniae IgM Abs <770 U/mL      Comment:                              Negative            <770  Clinically significant amount of M. pneumoniae antibody  not detected.                               Low Positive   770 - 950  M. pneumoniae specific IgM presumptively detected.  It  is recommended that another sample be collected 1-2  weeks later to assure reactivity.                               Positive            >950  Highly significant amount of M. pneumoniae specific  IgM antibody detected.       Narrative:      Performed at:  [address] [place] [address]  [id number]  : Robbin Ladd PhD, Phone:  [phone]    POC Glucose 4x Daily Before Meals & at Bedtime [[id number]]  (Abnormal) Collected: 09/30/24 1116    Specimen: Blood Updated: 09/30/24 1118     Glucose 202 mg/dL      Comment: Serial Number: [id number]:  [id number]       BNP [[id number]]  (Abnormal) Collected: 09/30/24 0401    Specimen: Blood Updated: 09/30/24 1042     proBNP 2,153.0 pg/mL     Narrative:      This assay is used as an aid in the diagnosis of  individuals suspected of having heart failure. It can be used as an aid in the diagnosis of acute decompensated heart failure (ADHF) in patients presenting with signs and symptoms of ADHF to the emergency department (ED). In addition, NT-proBNP of <300 pg/mL indicates ADHF is not likely.    Age Range Result Interpretation  NT-proBNP Concentration (pg/mL:      <50             Positive            >450                   Gray                 300-450                    Negative             <300    50-75           Positive            >900                  Gray                300-900                  Negative            <300      >75             Positive            >1800                  Gray                300-1800                  Negative            <300    POC Glucose 4x Daily Before Meals & at Bedtime [[id number]]  (Abnormal) Collected: 09/30/24 0743    Specimen: Blood Updated: 09/30/24 0745     Glucose 155 mg/dL      Comment: Serial Number: [id number]:  [id number]               Imaging Results (Last 24 Hours)       ** No results found for the last 24 hours. **            LAB RESULTS (LAST 7 DAYS)    CBC  Results from last 7 days   Lab Units 10/01/24  0308 09/30/24  0401 09/28/24  0511 09/27/24  1724 09/26/24  1347 09/26/24  1119   WBC 10*3/mm3 7.30 5.47 4.30 3.94 4.04 3.40   RBC 10*6/mm3 2.50* 2.51* 2.66* 2.68* 2.64* 2.60*   HEMOGLOBIN g/dL 6.9* 7.0* 7.5* 7.7* 7.6* 7.4*   HEMATOCRIT % 23.1* 23.3* 24.8* 24.5* 25.2* 24.3*   MCV fL 92.4 92.8 93.2 91.4 95.5 93.5   PLATELETS 10*3/mm3 219 280 422 448 450 469*       BMP  Results from last 7 days   Lab Units 10/01/24  0308 09/30/24  0401 09/29/24  1810 09/28/24  0511 09/27/24  1724   SODIUM mmol/L 139 140 141 140 139   POTASSIUM mmol/L 4.8 4.8 4.8 5.3* 4.8   CHLORIDE mmol/L 107 106 106 106 104   CO2 mmol/L 22.0 22.4 22.6 23.0 22.9   BUN mg/dL 39* 37* 38* 31* 34*   CREATININE mg/dL 1.12* 1.17* 1.12* 1.13* 1.29*   GLUCOSE mg/dL 131* 163* 162* 91 122*   MAGNESIUM mg/dL  --    --   --  2.4  --    PHOSPHORUS mg/dL  --   --   --  4.0  --        CMP   Results from last 7 days   Lab Units 10/01/24  0308 09/30/24  0401 09/29/24  1810 09/28/24  0511 09/27/24  1724   SODIUM mmol/L 139 140 141 140 139   POTASSIUM mmol/L 4.8 4.8 4.8 5.3* 4.8   CHLORIDE mmol/L 107 106 106 106 104   CO2 mmol/L 22.0 22.4 22.6 23.0 22.9   BUN mg/dL 39* 37* 38* 31* 34*   CREATININE mg/dL 1.12* 1.17* 1.12* 1.13* 1.29*   GLUCOSE mg/dL 131* 163* 162* 91 122*   ALBUMIN g/dL 3.6 3.6  --  3.9 4.0   BILIRUBIN mg/dL 0.7 1.0  --  1.3* 1.8*   ALK PHOS U/L 431* 422*  --  326* 317*   AST (SGOT) U/L 21 30  --  30 28   ALT (SGPT) U/L 25 35*  --  24 24       BNP        TROPONIN  Results from last 7 days   Lab Units 09/28/24  0745   HSTROP T ng/L 17*       CoAg  Results from last 7 days   Lab Units 09/27/24  1724   INR  1.06   APTT seconds 34.6*       Creatinine Clearance  Estimated Creatinine Clearance: 57.5 mL/min (A) (by C-G formula based on SCr of 1.12 mg/dL (H)).    ABG        Radiology  No radiology results for the last day      EKG  I personally viewed and interpreted the patient's EKG/Telemetry data:  ECG 12 Lead Tachycardia   Preliminary Result   HEART YAQL=671  bpm   RR Cqfbivtn=027  ms   CA Mlwwelfe=621  ms   P Horizontal Axis=-69  deg   P Front Axis=23  deg   QRSD Interval=89  ms   QT Fgcensec=106  ms   BLcN=992  ms   QRS Axis=-9  deg   T Wave Axis=29  deg   - BORDERLINE ECG -   Sinus tachycardia   Low voltage, extremity leads   Consider anterior infarct   Date and Time of Study:2024-09-30 00:46:19      ECG 12 Lead Dyspnea   Preliminary Result   HEART UZHK=243  bpm   RR Zxcpkwdn=054  ms   CA Wlxjuznu=776  ms   P Horizontal Axis=7  deg   P Front Axis=54  deg   QRSD Interval=90  ms   QT Sxkxixfw=702  ms   SYtG=277  ms   QRS Axis=49  deg   T Wave Axis=39  deg   - OTHERWISE NORMAL ECG -   Sinus tachycardia   Low voltage, extremity leads   Date and Time of Study:2024-09-27 16:53:24      Telemetry Scan   Final Result       Telemetry Scan   Final Result      Telemetry Scan   Final Result      Telemetry Scan   Final Result      Telemetry Scan   Final Result      Telemetry Scan   Final Result      Telemetry Scan   Final Result      Telemetry Scan   Final Result      Telemetry Scan   Final Result      Telemetry Scan   Final Result      Telemetry Scan   Final Result      Telemetry Scan   Final Result      Telemetry Scan   Final Result      Telemetry Scan   Final Result      Telemetry Scan   Final Result      Telemetry Scan   Final Result      Telemetry Scan   Final Result      Telemetry Scan   Final Result      Telemetry Scan   Final Result            Echocardiogram:    Results for orders placed during the hospital encounter of 03/19/24    Adult Transthoracic Echo Complete W/ Cont if Necessary Per Protocol    Interpretation Summary    Left ventricular systolic function is normal. Calculated left ventricular EF = 62%    Left ventricular diastolic function was normal.    The right ventricular cavity is mildly dilated.    Estimated right ventricular systolic pressure from tricuspid regurgitation is mildly elevated (35-45 mmHg).    The inferior vena cava is normally sized. Normal IVC inspiratory collapse of greater than 50% noted.        Stress Test:         Cardiac Catheterization:  No results found for this or any previous visit.         Other:         ASSESSMENT & PLAN:    Principal Problem:    Acute hypoxemic respiratory failure  Active Problems:    CML (chronic myelocytic leukemia)    History of pulmonary embolism    Medically noncompliant    Type 2 diabetes mellitus with diabetic polyneuropathy, with long-term current use of insulin    Sinus tachycardia    NICM (nonischemic cardiomyopathy)    Anemia due to chemotherapy    Acute on chronic HFrEF (heart failure with reduced ejection fraction)    Acute on chronic HFrEF (heart failure with reduced ejection fraction) secondary to NICM (nonischemic cardiomyopathy)  Bilateral lower extremity  edema  Previous EF 26-30% in Nov. 2022.  Repeat echocardiogram in March 2024 showed improvement in LV function with EF of 62%  Patient has been noncompliant with medications and outpatient follow up  High-sensitivity troponin of 17 and 22, proBNP of 4000  Previously proBNP was 12,000  Continue diuretics: Increased to 40 mg IV twice daily  Monitor I's and O's and replace electrolytes as needed  Closely monitor renal function  Continue losartan, Toprol-XL and Jardiance  Emphasized importance of compliance with medications and follow-up  Low-salt diet discussed with the patient     Acute respiratory failure with hypoxia  Likely multifactorial, secondary to pneumonia, CHF, and anemia   Started on antibiotics  Management per primary  Currently on 4 L of oxygen via nasal cannula.  Wean as tolerated     Anemia due to chemotherapy  H&H 6.9/23.1  Another unit of blood transfusion today  Additional diuretics with blood transfusion  Discontinue Xarelto     CML (chronic myelocytic leukemia)  On chemotherapy, followed by oncology      Medically noncompliant  Compliance encouraged  / consulted   HF Nurse Navigator consulted as above     Type 2 diabetes mellitus with diabetic polyneuropathy, with long-term current use of insulin  A1c of 8.2  On Lantus and SSI     Sinus tachycardia  Due to underlying infection, anemia and hypoxia  Expected to improve with transfusion  Will need additional diuretics with blood transfusion today  Treat underlying causes  On Toprol-XL 50 mg twice daily      Rohan Jackson MD  10/01/24  06:47 EDT

## 2024-10-01 NOTE — PLAN OF CARE
Problem: Adult Inpatient Plan of Care  Goal: Plan of Care Review  Outcome: Progressing  Goal: Patient-Specific Goal (Individualized)  Outcome: Progressing  Goal: Absence of Hospital-Acquired Illness or Injury  Outcome: Progressing  Intervention: Identify and Manage Fall Risk  Recent Flowsheet Documentation  Taken 10/1/2024 1626 by Sarah Can LPN  Safety Promotion/Fall Prevention:   activity supervised   assistive device/personal items within reach   clutter free environment maintained   lighting adjusted   room organization consistent   safety round/check completed  Taken 10/1/2024 1412 by Sarah Can LPN  Safety Promotion/Fall Prevention:   activity supervised   assistive device/personal items within reach   clutter free environment maintained   lighting adjusted   room organization consistent   safety round/check completed  Taken 10/1/2024 1215 by Sarah Can LPN  Safety Promotion/Fall Prevention:   activity supervised   assistive device/personal items within reach   clutter free environment maintained   lighting adjusted   room organization consistent   safety round/check completed  Taken 10/1/2024 1108 by Sarah Can LPN  Safety Promotion/Fall Prevention:   activity supervised   assistive device/personal items within reach   clutter free environment maintained   lighting adjusted   room organization consistent   safety round/check completed  Taken 10/1/2024 1012 by Sarah Can LPN  Safety Promotion/Fall Prevention:   activity supervised   assistive device/personal items within reach   clutter free environment maintained   lighting adjusted   room organization consistent   safety round/check completed  Taken 10/1/2024 0820 by Sarah Can LPN  Safety Promotion/Fall Prevention:   activity supervised   assistive device/personal items within reach   clutter free environment maintained   lighting adjusted   room organization consistent   safety round/check completed  Intervention:  Prevent Skin Injury  Recent Flowsheet Documentation  Taken 10/1/2024 1626 by Sarah Can LPN  Skin Protection:   adhesive use limited   incontinence pads utilized   tubing/devices free from skin contact  Taken 10/1/2024 1215 by Sarah Can LPN  Skin Protection: adhesive use limited  Taken 10/1/2024 1108 by Sarah Can LPN  Skin Protection:   adhesive use limited   incontinence pads utilized   tubing/devices free from skin contact  Taken 10/1/2024 0820 by Sarah Can LPN  Skin Protection:   adhesive use limited   incontinence pads utilized   tubing/devices free from skin contact  Intervention: Prevent and Manage VTE (Venous Thromboembolism) Risk  Recent Flowsheet Documentation  Taken 10/1/2024 1108 by Sarah Can LPN  VTE Prevention/Management:   sequential compression devices off   patient refused intervention  Intervention: Prevent Infection  Recent Flowsheet Documentation  Taken 10/1/2024 1626 by Sarah Can LPN  Infection Prevention:   cohorting utilized   environmental surveillance performed   hand hygiene promoted   personal protective equipment utilized   single patient room provided   rest/sleep promoted   visitors restricted/screened  Taken 10/1/2024 1412 by Sarah Can LPN  Infection Prevention:   cohorting utilized   environmental surveillance performed   hand hygiene promoted   personal protective equipment utilized   single patient room provided   rest/sleep promoted   visitors restricted/screened  Taken 10/1/2024 1215 by Sarah Can LPN  Infection Prevention:   cohorting utilized   environmental surveillance performed   hand hygiene promoted   personal protective equipment utilized   rest/sleep promoted   single patient room provided   visitors restricted/screened  Taken 10/1/2024 1108 by Sarah Can LPN  Infection Prevention:   cohorting utilized   environmental surveillance performed   hand hygiene promoted   personal protective equipment utilized    rest/sleep promoted   single patient room provided   visitors restricted/screened  Taken 10/1/2024 1012 by Sarah Can LPN  Infection Prevention:   cohorting utilized   environmental surveillance performed   hand hygiene promoted   personal protective equipment utilized   single patient room provided   rest/sleep promoted   visitors restricted/screened  Taken 10/1/2024 0820 by Sarah Can LPN  Infection Prevention:   cohorting utilized   environmental surveillance performed   hand hygiene promoted   personal protective equipment utilized   rest/sleep promoted   single patient room provided   visitors restricted/screened  Goal: Optimal Comfort and Wellbeing  Outcome: Progressing  Intervention: Monitor Pain and Promote Comfort  Recent Flowsheet Documentation  Taken 10/1/2024 0820 by Sarah Can LPN  Pain Management Interventions: see MAR  Intervention: Provide Person-Centered Care  Recent Flowsheet Documentation  Taken 10/1/2024 1215 by Sarah Can LPN  Trust Relationship/Rapport:   care explained   choices provided  Taken 10/1/2024 1108 by Sarah Can LPN  Trust Relationship/Rapport:   care explained   choices provided   thoughts/feelings acknowledged  Goal: Readiness for Transition of Care  Outcome: Progressing   Goal Outcome Evaluation:          Patient received 1 unit of PRBC this shift. Patient tolerated without adverse effects noted at this time. Patient c/o headache earlier in shift but no further c/o pain. Patient has pain pump implanted. Patient has call light within reach.

## 2024-10-01 NOTE — CASE MANAGEMENT/SOCIAL WORK
Continued Stay Note  ZOHAIB Amor     Patient Name: Nieves Mc  MRN: [id number]  Today's Date: 10/1/2024    Admit Date: 9/27/2024    Plan: DC PLAN: Routine home. Referral to [facility]. (Will need order)     Discharge Plan       Row Name 10/01/24 1330       Plan    Plan DC PLAN: Routine home. Referral to [facility]. (Will need order)        Patient/Family in Agreement with Plan yes    Plan Comments DC BARRIERS: Hmg 6.9 PRBC, Xarelto on hold, 02 3L IV Lasix, anticipate discharge in the next one to two days.                    Expected Discharge Date and Time       Expected Discharge Date Expected Discharge Time    Oct 2, 2024             Sharon Lovett RN    Case Management  [phone]

## 2024-10-01 NOTE — PROGRESS NOTES
[facility] MEDICINE SERVICE  DAILY PROGRESS NOTE    NAME: Nieves Mc  : 1975  MRN: [id number]      LOS: 4 days     PROVIDER OF SERVICE: Gilda Hernandez MD    Chief Complaint: Acute hypoxemic respiratory failure    Subjective:     Interval History:  History taken from: patient      History of Present Illness: Nieves Mc is a 48 y.o. female with a PMH of CML on ponatinib, chronic anemia, leukopenia on bilateral subdural hematoma, systolic CHF, Diabetes type 1, PE on xarelto, anxiety who presented to [facility] on 2024 with shhypoxia. Patient was evaluated earlier at cancer center for a blood transfusion, was then found to be hypoxic SPO2 76 %. Patient was subsequently sent to ED for evaluation. Denies chest pain, fever chills, hemoptysis. She also denies SOB. Reports bilateral LE swelling.  Evaluation in ED patient saturating 86 %, requiring 3 L NC to maintain SPO2 > 90 %. Tachycardic in mid 110s, afebrile, slightly hypertensive. CXR done showed patchy right greater than left airspace opacities. CBC labs notable for neutropenia, Hb 7.7. Chemistry labs T bili 1.8, creat 1.29, BUN 34. HS trop marginally elevated 17, proBNP 4.257. The decision to admit was made.      24 seen in bd NAD, C/O dyspnea, on 5lts oxygen  24 still c/o dyspnea on 5 lts, bp stable, will likely need Oxygen on dc. GIRMA RN  24 patient seen and examined in bed no acute distress, heart rate 133, dyspnea on 5 L, hemoglobin 7, will transfuse 1 units of packed RBC, discussed with RN.    Review of Systems  Constitutional:  Positive for fatigue. Negative for fever.   HENT:  Positive for congestion.    Respiratory:  Positive for cough and shortness of breath. Negative for chest tightness.    Cardiovascular:  Negative for chest pain.   Gastrointestinal:  Negative for abdominal pain.   Genitourinary:  Negative for dysuria.   Psychiatric/Behavioral:  Negative for confusion.    Objective:     Vital  Signs  Temp:  [97.6 °F (36.4 °C)-98.5 °F (36.9 °C)] 98 °F (36.7 °C)  Heart Rate:  [104-137] 124  Resp:  [15-21] 17  BP: (109-136)/(70-81) 115/70  Flow (L/min):  [3-5] 5   Body mass index is 25.06 kg/m².    Physical Exam  General: Alert and oriented, no acute distress. Pale looking  HENT: Normocephalic, moist oral mucosa, no scleral icterus.  Neck: Supple, nontender, no carotid bruits, no JVD, no LAD.  Lungs: bilateral rales   Heart: RRR, no murmur, gallop or edema.  Abdomen: Soft, nontender, nondistended, + bowel sounds.  Musculoskeletal: pedal edema   Neuro: alert and awake, moving all 4 extremities   Skin: Skin is warm, dry and pink, no rashes or lesions.  Psychiatric: Cooperative, appropriate mood and affect.         Diagnostic Data    Results from last 7 days   Lab Units 10/01/24  0308   WBC 10*3/mm3 7.30   HEMOGLOBIN g/dL 6.9*   HEMATOCRIT % 23.1*   PLATELETS 10*3/mm3 219   GLUCOSE mg/dL 131*   CREATININE mg/dL 1.12*   BUN mg/dL 39*   SODIUM mmol/L 139   POTASSIUM mmol/L 4.8   AST (SGOT) U/L 21   ALT (SGPT) U/L 25   ALK PHOS U/L 431*   BILIRUBIN mg/dL 0.7   ANION GAP mmol/L 10.0       No radiology results for the last day    Scheduled Meds:acetaminophen, 650 mg, Oral, Once   Or  acetaminophen, 650 mg, Oral, Once   Or  acetaminophen, 650 mg, Rectal, Once  cefepime, 2,000 mg, Intravenous, Q12H  furosemide, 20 mg, Intravenous, Once  furosemide, 40 mg, Intravenous, BID  gabapentin, 800 mg, Oral, Q8H  insulin glargine, 28 Units, Subcutaneous, Nightly  insulin lispro, 2-7 Units, Subcutaneous, 4x Daily AC & at Bedtime  insulin lispro, 7 Units, Subcutaneous, TID With Meals  losartan, 12.5 mg, Oral, Q24H  metoprolol succinate XL, 50 mg, Oral, Q12H  mirtazapine, 15 mg, Oral, Nightly  [Held by provider] rivaroxaban, 10 mg, Oral, Daily  sodium chloride, 10 mL, Intravenous, Q12H  tiZANidine, 4 mg, Oral, Daily      Continuous Infusions:pain,   Pharmacy to Dose Cefepime,       PRN Meds:.  acetaminophen **OR** acetaminophen  **OR** acetaminophen    ALPRAZolam    senna-docusate sodium **AND** polyethylene glycol **AND** bisacodyl **AND** bisacodyl    Calcium Replacement - Follow Nurse / BPA Driven Protocol    dextrose    dextrose    Diclofenac Sodium    glucagon (human recombinant)    hydrALAZINE    Magnesium Standard Dose Replacement - Follow Nurse / BPA Driven Protocol    ondansetron    Pharmacy to Dose Cefepime    Phosphorus Replacement - Follow Nurse / BPA Driven Protocol    Potassium Replacement - Follow Nurse / BPA Driven Protocol    [COMPLETED] Insert Peripheral IV **AND** sodium chloride    sodium chloride    sodium chloride     I reviewed the patient's new clinical results.    Assessment/Plan:     Active and Resolved Problems  Active Hospital Problems    Diagnosis  POA    **Acute hypoxemic respiratory failure [J96.01]  Yes    Acute on chronic HFrEF (heart failure with reduced ejection fraction) [I50.23]  Yes    Sinus tachycardia [R00.0]  Yes    Anemia due to chemotherapy [D64.81, T45.1X5A]  Yes    NICM (nonischemic cardiomyopathy) [I42.8]  Yes    Type 2 diabetes mellitus with diabetic polyneuropathy, with long-term current use of insulin [E11.42, Z79.4]  Not Applicable    Medically noncompliant [Z91.199]  Not Applicable    History of pulmonary embolism [Z86.711]  Yes    CML (chronic myelocytic leukemia) [C92.10]  Yes      Resolved Hospital Problems   No resolved problems to display.     Acute hypoxemic respiratory failure due to Community Acquired Pneumonia and acute on chronic diastolic CHF  Patient requiring 5 L NC oxygen supplementation to maintain SPO2 > 90 %  CXR showed bilateral airspace opacities R>L  Continue cefepime   Follow blood and sputum culture, atypical workup- NGTD  Repeat Xray to see interval change     Acute on chronic HFrEF (heart failure with reduced ejection fraction) secondary to NICM (nonischemic cardiomyopathy)  Bilateral lower extremity edema  Cardiology consulted  Previous EF 26-30% in Nov.  2022,  Repeat echocardiogram in March 2024 showed improvement in LV function with EF of 62%  Patient has been noncompliant with medications and outpatient follow up  High-sensitivity troponin of 17 and 22, proBNP of 4000  Start Lasix  Monitor I's and O's and replace electrolytes as needed  Creatinine 1.13  Resume losartan, Toprol-XL and Jardiance  Pro BNP 4257>2153  -lasix 20 mg IV bid  TTE on 3/21/24 showed LVEF > 62 %, was 44 % on 3/10/24  Wean oxygen as tolerated     Type 1 DM  Continue insulin lantus 28 units HS  Continue lispro 7 units before meals plus SS     CML (chronic myelocytic leukemia)  On chemotherapy, followed by oncology   Chronic anemia  Neutropenia  Completed blood transfusion prior to coming to ED  Continue Ponatinib  Transfuse blood products as needed  1PRBC transfusion today      History of anxiety  Continue Remeron   Continue Xanax    Sinus tachycardia-Due to underlying infection, anemia and hypoxia  Per cardiology Treat underlying causes  Increased Toprol-XL to twice daily    Medically noncompliant  Compliance encouraged  / consulted   HF Nurse Navigator consulted as above       VTE Prophylaxis:  Pharmacologic VTE prophylaxis orders are present.      Disposition Planning:   Barriers to Discharge:dyspnea  Anticipated Date of Discharge: 10/3  Place of Discharge: home      Time: 40 minutes     Code Status and Medical Interventions: CPR (Attempt to Resuscitate); Full Support   Ordered at: 09/28/24 0443     Code Status (Patient has no pulse and is not breathing):    CPR (Attempt to Resuscitate)     Medical Interventions (Patient has pulse or is breathing):    Full Support       Signature: Electronically signed by Gilda Hernandez MD, 10/01/24, 13:13 EDT.  [facility] Hospitalist Team

## 2024-10-01 NOTE — PLAN OF CARE
Goal Outcome Evaluation:  Plan of Care Reviewed With: patient     Problem: Adult Inpatient Plan of Care  Goal: Plan of Care Review  Outcome: Progressing  Flowsheets (Taken 10/1/2024 9563)  Progress: no change  Plan of Care Reviewed With: patient        Progress: no change

## 2024-10-01 NOTE — PROGRESS NOTES
Transportation:    [facility] christel has been scheduled to  pt for her appts as follows:      11/7 11:55 Lab/Dr. Lerma appt    12/19 12:10 Lab/Dr. Lerma appt    1/30 12:10 Lab/Dr. Lerma appt

## 2024-10-02 ENCOUNTER — DOCUMENTATION (OUTPATIENT)
Dept: HOME HEALTH SERVICES | Facility: HOME HEALTHCARE | Age: 49
End: 2024-10-02
Payer: COMMERCIAL

## 2024-10-02 ENCOUNTER — APPOINTMENT (OUTPATIENT)
Dept: CARDIOLOGY | Facility: HOSPITAL | Age: 49
DRG: 193 | End: 2024-10-02
Payer: MEDICARE

## 2024-10-02 ENCOUNTER — TRANSCRIBE ORDERS (OUTPATIENT)
Dept: HOME HEALTH SERVICES | Facility: HOME HEALTHCARE | Age: 49
End: 2024-10-02
Payer: COMMERCIAL

## 2024-10-02 DIAGNOSIS — J18.9 PNEUMONIA DUE TO INFECTIOUS ORGANISM, UNSPECIFIED LATERALITY, UNSPECIFIED PART OF LUNG: Primary | ICD-10-CM

## 2024-10-02 LAB
ALBUMIN SERPL-MCNC: 3.7 G/DL (ref 3.5–5.2)
ALBUMIN/GLOB SERPL: 0.9 G/DL
ALP SERPL-CCNC: 449 U/L (ref 39–117)
ALT SERPL W P-5'-P-CCNC: 18 U/L (ref 1–33)
ANION GAP SERPL CALCULATED.3IONS-SCNC: 10.1 MMOL/L (ref 5–15)
AORTIC DIMENSIONLESS INDEX: 0.69 (DI)
AST SERPL-CCNC: 15 U/L (ref 1–32)
BACTERIA SPEC AEROBE CULT: NORMAL
BASOPHILS # BLD MANUAL: 0.26 10*3/MM3 (ref 0–0.2)
BASOPHILS NFR BLD MANUAL: 2 % (ref 0–1.5)
BH BB BLOOD EXPIRATION DATE: NORMAL
BH BB BLOOD TYPE BARCODE: 6200
BH BB DISPENSE STATUS: NORMAL
BH BB PRODUCT CODE: NORMAL
BH BB UNIT NUMBER: NORMAL
BH CV ECHO MEAS - AO MAX PG: 10.6 MMHG
BH CV ECHO MEAS - AO MEAN PG: 6 MMHG
BH CV ECHO MEAS - AO V2 MAX: 163 CM/SEC
BH CV ECHO MEAS - AO V2 VTI: 30.7 CM
BH CV ECHO MEAS - AVA(I,D): 2.04 CM2
BH CV ECHO MEAS - EDV(CUBED): 74.1 ML
BH CV ECHO MEAS - EDV(MOD-SP2): 73.9 ML
BH CV ECHO MEAS - EDV(MOD-SP4): 86.2 ML
BH CV ECHO MEAS - EF(MOD-BP): 73.3 %
BH CV ECHO MEAS - EF(MOD-SP2): 75 %
BH CV ECHO MEAS - EF(MOD-SP4): 72.4 %
BH CV ECHO MEAS - ESV(CUBED): 13.8 ML
BH CV ECHO MEAS - ESV(MOD-SP2): 18.5 ML
BH CV ECHO MEAS - ESV(MOD-SP4): 23.8 ML
BH CV ECHO MEAS - FS: 42.9 %
BH CV ECHO MEAS - IVS/LVPW: 1 CM
BH CV ECHO MEAS - IVSD: 1.1 CM
BH CV ECHO MEAS - LA DIMENSION: 4.2 CM
BH CV ECHO MEAS - LAT PEAK E' VEL: 12.8 CM/SEC
BH CV ECHO MEAS - LV DIASTOLIC VOL/BSA (35-75): 50.4 CM2
BH CV ECHO MEAS - LV MASS(C)D: 157.1 GRAMS
BH CV ECHO MEAS - LV MAX PG: 5 MMHG
BH CV ECHO MEAS - LV MEAN PG: 3 MMHG
BH CV ECHO MEAS - LV SYSTOLIC VOL/BSA (12-30): 13.9 CM2
BH CV ECHO MEAS - LV V1 MAX: 112 CM/SEC
BH CV ECHO MEAS - LV V1 VTI: 19.9 CM
BH CV ECHO MEAS - LVIDD: 4.2 CM
BH CV ECHO MEAS - LVIDS: 2.4 CM
BH CV ECHO MEAS - LVOT AREA: 3.1 CM2
BH CV ECHO MEAS - LVOT DIAM: 2 CM
BH CV ECHO MEAS - LVPWD: 1.1 CM
BH CV ECHO MEAS - MED PEAK E' VEL: 6.1 CM/SEC
BH CV ECHO MEAS - MV A DUR: 0.12 SEC
BH CV ECHO MEAS - MV A MAX VEL: 106 CM/SEC
BH CV ECHO MEAS - MV DEC SLOPE: 432 CM/SEC2
BH CV ECHO MEAS - MV DEC TIME: 0.16 SEC
BH CV ECHO MEAS - MV E MAX VEL: 108 CM/SEC
BH CV ECHO MEAS - MV E/A: 1.02
BH CV ECHO MEAS - MV MAX PG: 5.3 MMHG
BH CV ECHO MEAS - MV MEAN PG: 3 MMHG
BH CV ECHO MEAS - MV P1/2T: 78 MSEC
BH CV ECHO MEAS - MV V2 VTI: 25.4 CM
BH CV ECHO MEAS - MVA(P1/2T): 2.8 CM2
BH CV ECHO MEAS - MVA(VTI): 2.46 CM2
BH CV ECHO MEAS - PA ACC TIME: 0.11 SEC
BH CV ECHO MEAS - PA V2 MAX: 94.1 CM/SEC
BH CV ECHO MEAS - PULM A REVS DUR: 0.14 SEC
BH CV ECHO MEAS - PULM A REVS VEL: 41.4 CM/SEC
BH CV ECHO MEAS - PULM DIAS VEL: 32.8 CM/SEC
BH CV ECHO MEAS - PULM S/D: 0.93
BH CV ECHO MEAS - PULM SYS VEL: 30.5 CM/SEC
BH CV ECHO MEAS - RAP SYSTOLE: 15 MMHG
BH CV ECHO MEAS - RV MAX PG: 5 MMHG
BH CV ECHO MEAS - RV V1 MAX: 112 CM/SEC
BH CV ECHO MEAS - RV V1 VTI: 19.7 CM
BH CV ECHO MEAS - RVSP: 45 MMHG
BH CV ECHO MEAS - SV(LVOT): 62.5 ML
BH CV ECHO MEAS - SV(MOD-SP2): 55.4 ML
BH CV ECHO MEAS - SV(MOD-SP4): 62.4 ML
BH CV ECHO MEAS - SVI(LVOT): 36.5 ML/M2
BH CV ECHO MEAS - SVI(MOD-SP2): 32.4 ML/M2
BH CV ECHO MEAS - SVI(MOD-SP4): 36.5 ML/M2
BH CV ECHO MEAS - TAPSE (>1.6): 1.97 CM
BH CV ECHO MEAS - TR MAX PG: 30 MMHG
BH CV ECHO MEAS - TR MAX VEL: 274 CM/SEC
BH CV ECHO MEASUREMENTS AVERAGE E/E' RATIO: 11.43
BH CV XLRA - TDI S': 14.3 CM/SEC
BILIRUB SERPL-MCNC: 0.6 MG/DL (ref 0–1.2)
BLASTS NFR BLD MANUAL: 14 % (ref 0–0)
BUN SERPL-MCNC: 37 MG/DL (ref 6–20)
BUN/CREAT SERPL: 37 (ref 7–25)
CALCIUM SPEC-SCNC: 9.3 MG/DL (ref 8.6–10.5)
CHLORIDE SERPL-SCNC: 108 MMOL/L (ref 98–107)
CO2 SERPL-SCNC: 22.9 MMOL/L (ref 22–29)
CREAT SERPL-MCNC: 1 MG/DL (ref 0.57–1)
CROSSMATCH INTERPRETATION: NORMAL
DACRYOCYTES BLD QL SMEAR: ABNORMAL
DEPRECATED RDW RBC AUTO: 61.7 FL (ref 37–54)
EGFRCR SERPLBLD CKD-EPI 2021: 69.6 ML/MIN/1.73
ERYTHROCYTE [DISTWIDTH] IN BLOOD BY AUTOMATED COUNT: 18.6 % (ref 12.3–15.4)
GLOBULIN UR ELPH-MCNC: 4 GM/DL
GLUCOSE BLDC GLUCOMTR-MCNC: 155 MG/DL (ref 70–105)
GLUCOSE BLDC GLUCOMTR-MCNC: 158 MG/DL (ref 70–105)
GLUCOSE BLDC GLUCOMTR-MCNC: 163 MG/DL (ref 70–105)
GLUCOSE BLDC GLUCOMTR-MCNC: 173 MG/DL (ref 70–105)
GLUCOSE SERPL-MCNC: 165 MG/DL (ref 65–99)
HCT VFR BLD AUTO: 27.5 % (ref 34–46.6)
HGB BLD-MCNC: 8.3 G/DL (ref 12–15.9)
LARGE PLATELETS: ABNORMAL
LEFT ATRIUM VOLUME INDEX: 34.2 ML/M2
LYMPHOCYTES # BLD MANUAL: 5.02 10*3/MM3 (ref 0.7–3.1)
LYMPHOCYTES NFR BLD MANUAL: 6 % (ref 5–12)
MCH RBC QN AUTO: 27.8 PG (ref 26.6–33)
MCHC RBC AUTO-ENTMCNC: 30.2 G/DL (ref 31.5–35.7)
MCV RBC AUTO: 92 FL (ref 79–97)
METAMYELOCYTES NFR BLD MANUAL: 8 % (ref 0–0)
MONOCYTES # BLD: 0.77 10*3/MM3 (ref 0.1–0.9)
MYELOCYTES NFR BLD MANUAL: 6 % (ref 0–0)
NEUTROPHILS # BLD AUTO: 3.09 10*3/MM3 (ref 1.7–7)
NEUTROPHILS NFR BLD MANUAL: 16 % (ref 42.7–76)
NEUTS BAND NFR BLD MANUAL: 8 % (ref 0–5)
NRBC SPEC MANUAL: 3 /100 WBC (ref 0–0.2)
PLATELET # BLD AUTO: 216 10*3/MM3 (ref 140–450)
PMV BLD AUTO: 10.2 FL (ref 6–12)
POLYCHROMASIA BLD QL SMEAR: ABNORMAL
POTASSIUM SERPL-SCNC: 5.3 MMOL/L (ref 3.5–5.2)
PROMYELOCYTES NFR BLD MANUAL: 1 % (ref 0–0)
PROT SERPL-MCNC: 7.7 G/DL (ref 6–8.5)
RBC # BLD AUTO: 2.99 10*6/MM3 (ref 3.77–5.28)
SCAN SLIDE: NORMAL
SINUS: 3.1 CM
SMALL PLATELETS BLD QL SMEAR: ADEQUATE
SODIUM SERPL-SCNC: 141 MMOL/L (ref 136–145)
STJ: 2.3 CM
UNIT  ABO: NORMAL
UNIT  RH: NORMAL
VARIANT LYMPHS NFR BLD MANUAL: 3 % (ref 0–5)
VARIANT LYMPHS NFR BLD MANUAL: 36 % (ref 19.6–45.3)
WBC MORPH BLD: NORMAL
WBC NRBC COR # BLD AUTO: 12.86 10*3/MM3 (ref 3.4–10.8)

## 2024-10-02 PROCEDURE — 97116 GAIT TRAINING THERAPY: CPT

## 2024-10-02 PROCEDURE — 85025 COMPLETE CBC W/AUTO DIFF WBC: CPT | Performed by: NURSE PRACTITIONER

## 2024-10-02 PROCEDURE — 25010000002 FUROSEMIDE PER 20 MG: Performed by: INTERNAL MEDICINE

## 2024-10-02 PROCEDURE — 82948 REAGENT STRIP/BLOOD GLUCOSE: CPT | Performed by: STUDENT IN AN ORGANIZED HEALTH CARE EDUCATION/TRAINING PROGRAM

## 2024-10-02 PROCEDURE — 82948 REAGENT STRIP/BLOOD GLUCOSE: CPT

## 2024-10-02 PROCEDURE — 80053 COMPREHEN METABOLIC PANEL: CPT | Performed by: NURSE PRACTITIONER

## 2024-10-02 PROCEDURE — 93306 TTE W/DOPPLER COMPLETE: CPT | Performed by: INTERNAL MEDICINE

## 2024-10-02 PROCEDURE — 85007 BL SMEAR W/DIFF WBC COUNT: CPT | Performed by: NURSE PRACTITIONER

## 2024-10-02 PROCEDURE — 25010000002 ONDANSETRON PER 1 MG: Performed by: NURSE PRACTITIONER

## 2024-10-02 PROCEDURE — 25010000002 CEFEPIME PER 500 MG: Performed by: STUDENT IN AN ORGANIZED HEALTH CARE EDUCATION/TRAINING PROGRAM

## 2024-10-02 PROCEDURE — 99232 SBSQ HOSP IP/OBS MODERATE 35: CPT | Performed by: INTERNAL MEDICINE

## 2024-10-02 PROCEDURE — 25010000002 SULFUR HEXAFLUORIDE MICROSPH 60.7-25 MG RECONSTITUTED SUSPENSION: Performed by: STUDENT IN AN ORGANIZED HEALTH CARE EDUCATION/TRAINING PROGRAM

## 2024-10-02 PROCEDURE — 93306 TTE W/DOPPLER COMPLETE: CPT

## 2024-10-02 PROCEDURE — 63710000001 INSULIN GLARGINE PER 5 UNITS: Performed by: STUDENT IN AN ORGANIZED HEALTH CARE EDUCATION/TRAINING PROGRAM

## 2024-10-02 PROCEDURE — 63710000001 INSULIN LISPRO (HUMAN) PER 5 UNITS: Performed by: STUDENT IN AN ORGANIZED HEALTH CARE EDUCATION/TRAINING PROGRAM

## 2024-10-02 PROCEDURE — 97530 THERAPEUTIC ACTIVITIES: CPT

## 2024-10-02 PROCEDURE — 93005 ELECTROCARDIOGRAM TRACING: CPT | Performed by: INTERNAL MEDICINE

## 2024-10-02 RX ADMIN — Medication 10 ML: at 08:33

## 2024-10-02 RX ADMIN — GABAPENTIN 800 MG: 400 CAPSULE ORAL at 05:37

## 2024-10-02 RX ADMIN — MIRTAZAPINE 15 MG: 15 TABLET, FILM COATED ORAL at 20:38

## 2024-10-02 RX ADMIN — GABAPENTIN 800 MG: 400 CAPSULE ORAL at 13:49

## 2024-10-02 RX ADMIN — DICLOFENAC SODIUM 4 G: 10 GEL TOPICAL at 20:38

## 2024-10-02 RX ADMIN — SODIUM ZIRCONIUM CYCLOSILICATE 10 G: 10 POWDER, FOR SUSPENSION ORAL at 11:50

## 2024-10-02 RX ADMIN — TIZANIDINE 4 MG: 4 TABLET ORAL at 08:34

## 2024-10-02 RX ADMIN — FUROSEMIDE 40 MG: 10 INJECTION, SOLUTION INTRAMUSCULAR; INTRAVENOUS at 05:38

## 2024-10-02 RX ADMIN — FUROSEMIDE 40 MG: 10 INJECTION, SOLUTION INTRAMUSCULAR; INTRAVENOUS at 17:55

## 2024-10-02 RX ADMIN — SULFUR HEXAFLUORIDE 5 ML: KIT at 15:40

## 2024-10-02 RX ADMIN — METOPROLOL SUCCINATE 50 MG: 50 TABLET, EXTENDED RELEASE ORAL at 20:38

## 2024-10-02 RX ADMIN — INSULIN GLARGINE 28 UNITS: 100 INJECTION, SOLUTION SUBCUTANEOUS at 20:38

## 2024-10-02 RX ADMIN — METOPROLOL SUCCINATE 50 MG: 50 TABLET, EXTENDED RELEASE ORAL at 08:34

## 2024-10-02 RX ADMIN — ALPRAZOLAM 0.5 MG: 0.5 TABLET ORAL at 20:38

## 2024-10-02 RX ADMIN — ACETAMINOPHEN 650 MG: 325 TABLET, FILM COATED ORAL at 08:51

## 2024-10-02 RX ADMIN — INSULIN LISPRO 7 UNITS: 100 INJECTION, SOLUTION INTRAVENOUS; SUBCUTANEOUS at 09:32

## 2024-10-02 RX ADMIN — GABAPENTIN 800 MG: 400 CAPSULE ORAL at 21:31

## 2024-10-02 RX ADMIN — INSULIN LISPRO 2 UNITS: 100 INJECTION, SOLUTION INTRAVENOUS; SUBCUTANEOUS at 11:52

## 2024-10-02 RX ADMIN — INSULIN LISPRO 2 UNITS: 100 INJECTION, SOLUTION INTRAVENOUS; SUBCUTANEOUS at 09:31

## 2024-10-02 RX ADMIN — LOSARTAN POTASSIUM 12.5 MG: 25 TABLET, FILM COATED ORAL at 08:34

## 2024-10-02 RX ADMIN — ONDANSETRON 4 MG: 2 INJECTION, SOLUTION INTRAMUSCULAR; INTRAVENOUS at 17:55

## 2024-10-02 RX ADMIN — INSULIN LISPRO 7 UNITS: 100 INJECTION, SOLUTION INTRAVENOUS; SUBCUTANEOUS at 11:50

## 2024-10-02 RX ADMIN — CEFEPIME 2000 MG: 2 INJECTION, POWDER, FOR SOLUTION INTRAVENOUS at 05:37

## 2024-10-02 RX ADMIN — Medication 10 ML: at 20:38

## 2024-10-02 NOTE — PROGRESS NOTES
Patient is somnolent, hypertensive, tachycardic referring Provider: Gilda Hernandez,*    Reason for follow-up: HFrEF, tachycardia     Patient Care Team:  Pankaj Stover MD as PCP - General (Internal Medicine)  Meghann Lerma MD as Consulting Physician (Hematology and Oncology)  Hamida Feldman MD as Consulting Physician (Cardiology)  Rohan Jackson MD as Cardiologist (Cardiology)      SUBJECTIVE  Patient is somnolent, hypertensive, tachycardic and requiring 5 L of oxygen     ROS  Review of all systems negative except as indicated.    Since I have last seen, the patient has been without any chest discomfort, shortness of breath, palpitations, dizziness or syncope.  Denies having any headache, abdominal pain, nausea, vomiting, diarrhea, constipation, loss of weight or loss of appetite.  Denies having any excessive bruising, hematuria or blood in the stool.        Personal History:    Past Medical History:   Diagnosis Date    Acute respiratory failure with hypoxia 07/28/2022    Adverse effect of chemotherapy 11/08/2023    Bilateral subdural hematomas 05/18/2023    Bone pain     Chronic constipation 07/07/2023    CML (chronic myelocytic leukemia) 04/01/2018    COVID-19 virus infection 01/12/2022    Diabetes mellitus     Diabetic gastroparesis 07/07/2023    Extremity pain     Carlin. legs pain    Fall during current hospitalization 06/25/2023    Leg pain     left leg greater    Medically noncompliant 03/11/2021    Migraine     Petechial rash 11/08/2023    Pubic ramus fracture, left, closed, initial encounter 06/25/2023    Pulmonary embolism     Traumatic subdural hemorrhage without loss of consciousness 05/17/2023    Tumor lysis syndrome 07/05/2022    UTI (urinary tract infection) 07/06/2023    Vision loss     doing surgery       Past Surgical History:   Procedure Laterality Date    BONE MARROW BIOPSY      BREAST SURGERY      BRONCHOSCOPY N/A 6/6/2022    Procedure: BRONCHOSCOPY bilateral lung washing;   Surgeon: Charlene Camara MD;  Location: [facility] ENDOSCOPY;  Service: Pulmonary;  Laterality: N/A;  post: rule out infection vs transfusion lung injury     SECTION      CHOLECYSTECTOMY      COLONOSCOPY N/A 2023    Procedure: COLONOSCOPY;  Surgeon: Freddy Villeda MD;  Location: [facility] ENDOSCOPY;  Service: Gastroenterology;  Laterality: N/A;  poor prep    ENDOSCOPY N/A 2023    Procedure: ESOPHAGOGASTRODUODENOSCOPY with biopsy x2 areas;  Surgeon: Freddy Villeda MD;  Location: [facility] ENDOSCOPY;  Service: Gastroenterology;  Laterality: N/A;  food in stomach;abnormal duodenal mucosa    EYE SURGERY      laser surgery due  to hemmorage--- 2021-- another surgery  lt eye11/15/21    RETINAL DETACHMENT SURGERY      SPINE SURGERY      Lombardi spinal block    TUBAL ABDOMINAL LIGATION         Family History   Problem Relation Age of Onset    Diabetes Mother     Diabetes Maternal Grandmother     Heart attack Maternal Grandmother     Stroke Maternal Grandmother        Social History     Tobacco Use    Smoking status: Never    Smokeless tobacco: Never   Vaping Use    Vaping status: Never Used   Substance Use Topics    Alcohol use: No    Drug use: No        Home meds:  Prior to Admission medications    Medication Sig Start Date End Date Taking? Authorizing Provider   acetaminophen (TYLENOL) 325 MG tablet Take 2 tablets by mouth Every 4 (Four) Hours As Needed for Moderate Pain. Indications: Fever, Pain 24  Yes Meghann Lerma MD   ALPRAZolam (Xanax) 0.5 MG tablet Take 1 tablet by mouth At Night As Needed for Anxiety. Indications: Feeling Anxious 24  Yes Denisha Gill APRN   Diclofenac Sodium (VOLTAREN) 1 % gel gel Apply 4 g topically to the appropriate area as directed 4 (Four) Times a Day As Needed (hip pain). 24  Yes Denisha Gill APRN   doxycycline (VIBRAMYICN) 100 MG tablet Take 1 tablet by mouth 2 (Two) Times a Day for 10 days. 9/26/24 10/6/24 Yes Meghann Lerma  MD   furosemide (LASIX) 40 MG tablet Take 1 tablet by mouth Daily. Indications: Edema 9/19/24  Yes Pankaj Stover MD   gabapentin (Neurontin) 800 MG tablet Take 1 tablet by mouth 3 (Three) Times a Day. Ordered through [facility]  Indications: Diabetes with Nerve Disease 5/15/24  Yes Pankaj Stover MD   Insulin Glargine (LANTUS SOLOSTAR) 100 UNIT/ML injection pen Inject 28 Units under the skin into the appropriate area as directed Every Night. Indications: Type 2 Diabetes 9/16/24  Yes Pankaj Stover MD   Insulin Lispro, 1 Unit Dial, (HumaLOG KwikPen) 100 UNIT/ML solution pen-injector Inject 7 Units under the skin into the appropriate area as directed 3 (Three) Times a Day Before Meals. Dx code: E11.65 9/16/24  Yes Pankaj Stover MD   levoFLOXacin (Levaquin) 500 MG tablet Take 1 tablet by mouth Daily. 9/24/24  Yes Meghann Lerma MD   mirtazapine (REMERON SOL-TAB) 15 MG disintegrating tablet Place 1 tablet on the tongue Every Night.   Yes Provider, MD Melecio   pain patient supplied pump by Intrathecal route Continuous.   Yes ProviderMelecio MD   PONATinib HCl (Iclusig) 10 MG tablet Take 10 mg by mouth Every Other Day. Take with or without food.  Swallow whole, do not crush or chew. 8/21/24  Yes Meghann Lerma MD   rivaroxaban (Xarelto) 10 MG tablet Take 1 tablet by mouth Daily. 6/11/24  Yes Denisha Gill APRN   tiZANidine (ZANAFLEX) 4 MG tablet Take 1 tablet by mouth Daily. Indications: Musculoskeletal Pain 5/15/24  Yes Pankaj Stover MD       Allergies:  Patient has no known allergies.    Scheduled Meds:acetaminophen, 650 mg, Oral, Once   Or  acetaminophen, 650 mg, Oral, Once   Or  acetaminophen, 650 mg, Rectal, Once  cefepime, 2,000 mg, Intravenous, Q12H  furosemide, 40 mg, Intravenous, BID  gabapentin, 800 mg, Oral, Q8H  insulin glargine, 28 Units, Subcutaneous, Nightly  insulin lispro, 2-7 Units, Subcutaneous, 4x Daily AC & at Bedtime  insulin lispro, 7  "Units, Subcutaneous, TID With Meals  losartan, 12.5 mg, Oral, Q24H  metoprolol succinate XL, 50 mg, Oral, Q12H  mirtazapine, 15 mg, Oral, Nightly  [Held by provider] rivaroxaban, 10 mg, Oral, Daily  sodium chloride, 10 mL, Intravenous, Q12H  tiZANidine, 4 mg, Oral, Daily      Continuous Infusions:pain,   Pharmacy to Dose Cefepime,       PRN Meds:.  acetaminophen **OR** acetaminophen **OR** acetaminophen    ALPRAZolam    senna-docusate sodium **AND** polyethylene glycol **AND** bisacodyl **AND** bisacodyl    Calcium Replacement - Follow Nurse / BPA Driven Protocol    dextrose    dextrose    Diclofenac Sodium    glucagon (human recombinant)    hydrALAZINE    Magnesium Standard Dose Replacement - Follow Nurse / BPA Driven Protocol    ondansetron    Pharmacy to Dose Cefepime    Phosphorus Replacement - Follow Nurse / BPA Driven Protocol    Potassium Replacement - Follow Nurse / BPA Driven Protocol    [COMPLETED] Insert Peripheral IV **AND** sodium chloride    sodium chloride    sodium chloride      OBJECTIVE    Vital Signs  Vitals:    10/01/24 1514 10/01/24 1816 10/01/24 2341 10/02/24 0420   BP: 125/79 130/84 117/71 120/68   BP Location:   Right arm Right arm   Patient Position:   Sitting Sitting   Pulse: 99  109 95   Resp: 22 20 18 19   Temp: 98 °F (36.7 °C) 98.4 °F (36.9 °C) 98.1 °F (36.7 °C) 98 °F (36.7 °C)   TempSrc: Oral Oral Oral Oral   SpO2: 95% 94% 94% 94%   Weight:       Height:           Flowsheet Rows      Flowsheet Row First Filed Value   Admission Height 160 cm (63\") Documented at 09/27/2024 1630   Admission Weight 66.2 kg (146 lb) Documented at 09/27/2024 1630              Intake/Output Summary (Last 24 hours) at 10/2/2024 0735  Last data filed at 10/1/2024 2000  Gross per 24 hour   Intake 1168.75 ml   Output --   Net 1168.75 ml          Telemetry: Sinus tachycardia    Physical Exam:  The patient is alert, oriented and in no distress.  Vital signs as noted above.  Head and neck revealed no carotid bruits " or jugular venous distention.  No thyromegaly or lymphadenopathy is present  Lungs clear.  No wheezing.  Breath sounds are normal bilaterally.  Heart normal first and second heart sounds.  No murmur. No precordial rub is present.  No gallop is present.  Abdomen soft and nontender.  No organomegaly is present.  Extremities with good peripheral pulses with bilateral lower extremity edema  Skin warm and dry.  Musculoskeletal system is grossly normal.  CNS grossly normal.       Results Review:  I have personally reviewed the results from the time of this admission to 10/2/2024 07:35 EDT and agree with these findings:  []  Laboratory  []  Microbiology  []  Radiology  []  EKG/Telemetry   []  Cardiology/Vascular   []  Pathology  []  Old records  []  Other:    Most notable findings include:    Lab Results (last 24 hours)       Procedure Component Value Units Date/Time    POC Glucose 4x Daily Before Meals & at Bedtime [[id number]]  (Abnormal) Collected: 10/02/24 0731    Specimen: Blood Updated: 10/02/24 0733     Glucose 163 mg/dL      Comment: Serial Number: [id number]:  [id number]       CBC & Differential [[id number]]  (Abnormal) Collected: 10/02/24 0551    Specimen: Blood from Arm, Right Updated: 10/02/24 0710    Narrative:      The following orders were created for panel order CBC & Differential.  Procedure                               Abnormality         Status                     ---------                               -----------         ------                     CBC Auto Differential[[id number]]        Abnormal            Final result               Scan Slide[[id number]]                                       Final result                 Please view results for these tests on the individual orders.    Scan Slide [[id number]] Collected: 10/02/24 0551    Specimen: Blood from Arm, Right Updated: 10/02/24 0710     Scan Slide --     Comment: See Manual Differential Results       Manual Differential [[id number]]   (Abnormal) Collected: 10/02/24 0551    Specimen: Blood from Arm, Right Updated: 10/02/24 0710     Neutrophil % 16.0 %      Lymphocyte % 36.0 %      Monocyte % 6.0 %      Basophil % 2.0 %      Bands %  8.0 %      Metamyelocyte % 8.0 %      Myelocyte % 6.0 %      Promyelocyte % 1.0 %      Atypical Lymphocyte % 3.0 %      Blasts % 14.0 %      Neutrophils Absolute 3.09 10*3/mm3      Lymphocytes Absolute 5.02 10*3/mm3      Monocytes Absolute 0.77 10*3/mm3      Basophils Absolute 0.26 10*3/mm3      nRBC 3.0 /100 WBC      Dacrocytes Slight/1+     Polychromasia Slight/1+     WBC Morphology Normal     Platelet Estimate Adequate     Large Platelets Slight/1+    Narrative:      Reviewed by Pathologist within the past 30 days on 09/26/2024 .     CBC Auto Differential [[id number]]  (Abnormal) Collected: 10/02/24 0551    Specimen: Blood from Arm, Right Updated: 10/02/24 0710     WBC 12.86 10*3/mm3      RBC 2.99 10*6/mm3      Hemoglobin 8.3 g/dL      Hematocrit 27.5 %      MCV 92.0 fL      MCH 27.8 pg      MCHC 30.2 g/dL      RDW 18.6 %      RDW-SD 61.7 fl      MPV 10.2 fL      Platelets 216 10*3/mm3     Narrative:      The previously reported component NRBC is no longer being reported. Previous result was 0.9 /100 WBC (Reference Range: 0.0-0.2 /100 WBC) on 10/2/2024 at 0638 EDT.    Comprehensive Metabolic Panel [[id number]]  (Abnormal) Collected: 10/02/24 0551    Specimen: Blood from Arm, Right Updated: 10/02/24 0658     Glucose 165 mg/dL      BUN 37 mg/dL      Creatinine 1.00 mg/dL      Sodium 141 mmol/L      Potassium 5.3 mmol/L      Chloride 108 mmol/L      CO2 22.9 mmol/L      Calcium 9.3 mg/dL      Total Protein 7.7 g/dL      Albumin 3.7 g/dL      ALT (SGPT) 18 U/L      AST (SGOT) 15 U/L      Alkaline Phosphatase 449 U/L      Total Bilirubin 0.6 mg/dL      Globulin 4.0 gm/dL      A/G Ratio 0.9 g/dL      BUN/Creatinine Ratio 37.0     Anion Gap 10.1 mmol/L      eGFR 69.6 mL/min/1.73     Narrative:      GFR Normal >60  Chronic  Kidney Disease <60  Kidney Failure <15      POC Glucose Once [[id number]]  (Abnormal) Collected: 10/01/24 2021    Specimen: Blood Updated: 10/01/24 2024     Glucose 177 mg/dL      Comment: Serial Number: [id number]:  [id number]       Blood Culture - Blood, Arm, Left [[id number]]  (Normal) Collected: 09/27/24 1815    Specimen: Blood from Arm, Left Updated: 10/01/24 1831     Blood Culture No growth at 4 days    Narrative:      Less than seven (7) mL's of blood was collected.  Insufficient quantity may yield false negative results.    Hemoglobin & Hematocrit, Blood [[id number]]  (Abnormal) Collected: 10/01/24 1732    Specimen: Blood from Arm, Left Updated: 10/01/24 1754     Hemoglobin 8.0 g/dL      Hematocrit 26.0 %     POC Glucose 4x Daily Before Meals & at Bedtime [[id number]]  (Abnormal) Collected: 10/01/24 1747    Specimen: Blood Updated: 10/01/24 1750     Glucose 243 mg/dL      Comment: Serial Number: [id number]:  [id number]               Imaging Results (Last 24 Hours)       Procedure Component Value Units Date/Time    XR Chest 1 View [[id number]] Collected: 10/01/24 1422     Updated: 10/01/24 1428    Narrative:      XR CHEST 1 VW    Date of Exam: 10/1/2024 2:04 PM EDT    Indication: to see the interval change    Comparison: 9/27/2024    Findings:  Stable patchy airspace infiltrates and interstitial changes are seen throughout the lungs. The cardiac silhouette and mediastinum are stable.      Impression:      Impression:  Stable patchy multifocal airspace infiltrates interstitial changes throughout the lungs bilaterally.      Electronically Signed: Brian Marquez MD    10/1/2024 2:25 PM EDT    Workstation ID: [id number]            LAB RESULTS (LAST 7 DAYS)    CBC  Results from last 7 days   Lab Units 10/02/24  0551 10/01/24  1732 10/01/24  0308 09/30/24  0401 09/28/24  0511 09/27/24  1724 09/26/24  1347 09/26/24  1119   WBC 10*3/mm3 12.86*  --  7.30 5.47 4.30 3.94 4.04 3.40   RBC 10*6/mm3 2.99*  --  2.50*  2.51* 2.66* 2.68* 2.64* 2.60*   HEMOGLOBIN g/dL 8.3* 8.0* 6.9* 7.0* 7.5* 7.7* 7.6* 7.4*   HEMATOCRIT % 27.5* 26.0* 23.1* 23.3* 24.8* 24.5* 25.2* 24.3*   MCV fL 92.0  --  92.4 92.8 93.2 91.4 95.5 93.5   PLATELETS 10*3/mm3 216  --  219 280 422 448 450 469*       BMP  Results from last 7 days   Lab Units 10/02/24  0551 10/01/24  0308 09/30/24  0401 09/29/24  1810 09/28/24  0511 09/27/24  1724   SODIUM mmol/L 141 139 140 141 140 139   POTASSIUM mmol/L 5.3* 4.8 4.8 4.8 5.3* 4.8   CHLORIDE mmol/L 108* 107 106 106 106 104   CO2 mmol/L 22.9 22.0 22.4 22.6 23.0 22.9   BUN mg/dL 37* 39* 37* 38* 31* 34*   CREATININE mg/dL 1.00 1.12* 1.17* 1.12* 1.13* 1.29*   GLUCOSE mg/dL 165* 131* 163* 162* 91 122*   MAGNESIUM mg/dL  --   --   --   --  2.4  --    PHOSPHORUS mg/dL  --   --   --   --  4.0  --        CMP   Results from last 7 days   Lab Units 10/02/24  0551 10/01/24  0308 09/30/24  0401 09/29/24  1810 09/28/24  0511 09/27/24  1724   SODIUM mmol/L 141 139 140 141 140 139   POTASSIUM mmol/L 5.3* 4.8 4.8 4.8 5.3* 4.8   CHLORIDE mmol/L 108* 107 106 106 106 104   CO2 mmol/L 22.9 22.0 22.4 22.6 23.0 22.9   BUN mg/dL 37* 39* 37* 38* 31* 34*   CREATININE mg/dL 1.00 1.12* 1.17* 1.12* 1.13* 1.29*   GLUCOSE mg/dL 165* 131* 163* 162* 91 122*   ALBUMIN g/dL 3.7 3.6 3.6  --  3.9 4.0   BILIRUBIN mg/dL 0.6 0.7 1.0  --  1.3* 1.8*   ALK PHOS U/L 449* 431* 422*  --  326* 317*   AST (SGOT) U/L 15 21 30  --  30 28   ALT (SGPT) U/L 18 25 35*  --  24 24       BNP        TROPONIN  Results from last 7 days   Lab Units 09/28/24  0745   HSTROP T ng/L 17*       CoAg  Results from last 7 days   Lab Units 09/27/24  1724   INR  1.06   APTT seconds 34.6*       Creatinine Clearance  Estimated Creatinine Clearance: 64.4 mL/min (by C-G formula based on SCr of 1 mg/dL).    ABG        Radiology  XR Chest 1 View    Result Date: 10/1/2024  Impression: Stable patchy multifocal airspace infiltrates interstitial changes throughout the lungs bilaterally. Electronically  Signed: Brian Marquez MD  10/1/2024 2:25 PM EDT  Workstation ID: [id number]       EKG  I personally viewed and interpreted the patient's EKG/Telemetry data:  ECG 12 Lead Tachycardia   Preliminary Result   HEART SPYY=397  bpm   RR Tigezjyj=391  ms   RI Ikgluexk=826  ms   P Horizontal Axis=-69  deg   P Front Axis=23  deg   QRSD Interval=89  ms   QT Wtbcldsf=562  ms   CYqM=142  ms   QRS Axis=-9  deg   T Wave Axis=29  deg   - BORDERLINE ECG -   Sinus tachycardia   Low voltage, extremity leads   Consider anterior infarct   Date and Time of Study:2024-09-30 00:46:19      ECG 12 Lead Dyspnea   Preliminary Result   HEART IDIL=702  bpm   RR Jodmsjbd=411  ms   RI Vlopypbo=312  ms   P Horizontal Axis=7  deg   P Front Axis=54  deg   QRSD Interval=90  ms   QT Jqaledaa=971  ms   EVlZ=207  ms   QRS Axis=49  deg   T Wave Axis=39  deg   - OTHERWISE NORMAL ECG -   Sinus tachycardia   Low voltage, extremity leads   Date and Time of Study:2024-09-27 16:53:24      Telemetry Scan   Final Result      Telemetry Scan   Final Result      Telemetry Scan   Final Result      Telemetry Scan   Final Result      Telemetry Scan   Final Result      Telemetry Scan   Final Result      Telemetry Scan   Final Result      Telemetry Scan   Final Result      Telemetry Scan   Final Result      Telemetry Scan   Final Result      Telemetry Scan   Final Result      Telemetry Scan   Final Result      Telemetry Scan   Final Result      Telemetry Scan   Final Result      Telemetry Scan   Final Result      Telemetry Scan   Final Result      Telemetry Scan   Final Result      Telemetry Scan   Final Result      Telemetry Scan   Final Result      Telemetry Scan   Final Result      Telemetry Scan   Final Result      Telemetry Scan   Final Result      Telemetry Scan   Final Result      Telemetry Scan   Final Result      Telemetry Scan   Final Result            Echocardiogram:    Results for orders placed during the hospital encounter of 03/19/24    Adult Transthoracic  Echo Complete W/ Cont if Necessary Per Protocol    Interpretation Summary    Left ventricular systolic function is normal. Calculated left ventricular EF = 62%    Left ventricular diastolic function was normal.    The right ventricular cavity is mildly dilated.    Estimated right ventricular systolic pressure from tricuspid regurgitation is mildly elevated (35-45 mmHg).    The inferior vena cava is normally sized. Normal IVC inspiratory collapse of greater than 50% noted.        Stress Test:         Cardiac Catheterization:  No results found for this or any previous visit.         Other:         ASSESSMENT & PLAN:    Principal Problem:    Acute hypoxemic respiratory failure  Active Problems:    CML (chronic myelocytic leukemia)    History of pulmonary embolism    Medically noncompliant    Type 2 diabetes mellitus with diabetic polyneuropathy, with long-term current use of insulin    Sinus tachycardia    NICM (nonischemic cardiomyopathy)    Anemia due to chemotherapy    Acute on chronic HFrEF (heart failure with reduced ejection fraction)    Acute on chronic HFrEF (heart failure with reduced ejection fraction) secondary to NICM (nonischemic cardiomyopathy)  Bilateral lower extremity edema  Previous EF 26-30% in Nov. 2022.  Repeat echocardiogram in March 2024 showed improvement in LV function with EF of 62%  Patient has been noncompliant with medications and outpatient follow up.  High-sensitivity troponin of 17 and 22, proBNP of 4000  Previously proBNP was 12,000  Continue IV diuretics  Monitor I's and O's and replace electrolytes as needed  Renal function is normal.  Lokelma for hyperkalemia  Repeat echocardiogram  Continue losartan, Toprol-XL  Jardiance has been stopped  Emphasized importance of compliance with medications and follow-up  Low-salt diet discussed with the patient     Acute respiratory failure with hypoxia  Likely multifactorial, secondary to pneumonia, CHF, and anemia   Completed  antibiotics  Currently on 5 L of oxygen  Wean as tolerated     Anemia due to chemotherapy  H&H 8.3/27.5 after blood transfusion  Xarelto has been held     CML (chronic myelocytic leukemia)  On chemotherapy, followed by oncology      Medically noncompliant  Compliance encouraged  / consulted   HF Nurse Navigator consulted as above     Type 2 diabetes mellitus with diabetic polyneuropathy, with long-term current use of insulin  A1c of 8.2  On Lantus and SSI     Sinus tachycardia  Due to underlying infection, anemia and hypoxia  Expected to improve with transfusion  Will need additional diuretics with blood transfusion today  Treat underlying causes  On Toprol-XL 50 mg twice daily      Rohan Jackson MD  10/02/24  07:35 EDT

## 2024-10-02 NOTE — SIGNIFICANT NOTE
10/02/24 1424   OTHER   Discipline physical therapist   Rehab Time/Intention   Session Not Performed other (see comments)  (Pt eating at time of attempt)   Recommendation   PT - Next Appointment 10/03/24

## 2024-10-02 NOTE — PLAN OF CARE
Goal Outcome Evaluation:     Problem: Adult Inpatient Plan of Care  Goal: Plan of Care Review  Outcome: Progressing  Goal: Patient-Specific Goal (Individualized)  Outcome: Progressing  Goal: Absence of Hospital-Acquired Illness or Injury  Outcome: Progressing  Intervention: Identify and Manage Fall Risk  Recent Flowsheet Documentation  Taken 10/2/2024 1036 by Vidya Mendes LPN  Safety Promotion/Fall Prevention:   safety round/check completed   assistive device/personal items within reach  Taken 10/2/2024 0851 by Vidya Mendes LPN  Safety Promotion/Fall Prevention:   safety round/check completed   assistive device/personal items within reach  Intervention: Prevent Skin Injury  Recent Flowsheet Documentation  Taken 10/2/2024 0851 by Vidya Mendes LPN  Skin Protection:   adhesive use limited   incontinence pads utilized   skin-to-device areas padded  Intervention: Prevent and Manage VTE (Venous Thromboembolism) Risk  Recent Flowsheet Documentation  Taken 10/2/2024 0851 by Vidya Mendes LPN  Activity Management: up in chair  VTE Prevention/Management:   sequential compression devices off   patient refused intervention  Range of Motion: active ROM (range of motion) encouraged  Intervention: Prevent Infection  Recent Flowsheet Documentation  Taken 10/2/2024 1036 by Vidya Mendes LPN  Infection Prevention:   cohorting utilized   single patient room provided   rest/sleep promoted   personal protective equipment utilized   hand hygiene promoted   equipment surfaces disinfected  Taken 10/2/2024 0851 by Vidya Mendes LPN  Infection Prevention:   cohorting utilized   hand hygiene promoted   single patient room provided   personal protective equipment utilized   rest/sleep promoted  Goal: Optimal Comfort and Wellbeing  Outcome: Progressing  Intervention: Monitor Pain and Promote Comfort  Recent Flowsheet Documentation  Taken 10/2/2024 0851 by Vidya Mendes LPN  Pain Management Interventions: see MAR  Intervention: Provide  Person-Centered Care  Recent Flowsheet Documentation  Taken 10/2/2024 0851 by Vidya Mendes LPN  Trust Relationship/Rapport:   care explained   emotional support provided   thoughts/feelings acknowledged  Goal: Readiness for Transition of Care  Outcome: Progressing     Problem: Diabetes Comorbidity  Goal: Blood Glucose Level Within Targeted Range  Outcome: Progressing  Intervention: Monitor and Manage Glycemia  Recent Flowsheet Documentation  Taken 10/2/2024 0851 by Vidya Mendes LPN  Glycemic Management: blood glucose monitored     Problem: Heart Failure Comorbidity  Goal: Maintenance of Heart Failure Symptom Control  Outcome: Progressing  Intervention: Maintain Heart Failure-Management  Recent Flowsheet Documentation  Taken 10/2/2024 0851 by Vidya Mendes LPN  Medication Review/Management: medications reviewed     Problem: Pain Chronic (Persistent) (Comorbidity Management)  Goal: Acceptable Pain Control and Functional Ability  Outcome: Progressing  Intervention: Manage Persistent Pain  Recent Flowsheet Documentation  Taken 10/2/2024 0851 by Vidya Mendes LPN  Medication Review/Management: medications reviewed  Intervention: Develop Pain Management Plan  Recent Flowsheet Documentation  Taken 10/2/2024 0851 by Vidya Mendes LPN  Pain Management Interventions: see MAR  Intervention: Optimize Psychosocial Wellbeing  Recent Flowsheet Documentation  Taken 10/2/2024 0851 by Vidya Mendes LPN  Supportive Measures: active listening utilized  Diversional Activities: television     Problem: Skin Injury Risk Increased  Goal: Skin Health and Integrity  Outcome: Progressing  Intervention: Promote and Optimize Oral Intake  Recent Flowsheet Documentation  Taken 10/2/2024 0851 by Vidya Mendes LPN  Oral Nutrition Promotion: rest periods promoted  Intervention: Optimize Skin Protection  Recent Flowsheet Documentation  Taken 10/2/2024 0851 by Vidya Mendes LPN  Pressure Reduction Techniques: frequent weight shift  encouraged  Pressure Reduction Devices: chair cushion utilized  Skin Protection:   adhesive use limited   incontinence pads utilized   skin-to-device areas padded     Problem: Fall Injury Risk  Goal: Absence of Fall and Fall-Related Injury  Outcome: Progressing  Intervention: Identify and Manage Contributors  Recent Flowsheet Documentation  Taken 10/2/2024 0851 by Vidya Mendes LPN  Medication Review/Management: medications reviewed  Intervention: Promote Injury-Free Environment  Recent Flowsheet Documentation  Taken 10/2/2024 1036 by Vidya Mendes LPN  Safety Promotion/Fall Prevention:   safety round/check completed   assistive device/personal items within reach  Taken 10/2/2024 0851 by Vidya Mendes LPN  Safety Promotion/Fall Prevention:   safety round/check completed   assistive device/personal items within reach     Problem: Adjustment to Illness (Sepsis/Septic Shock)  Goal: Optimal Coping  Outcome: Progressing  Intervention: Optimize Psychosocial Adjustment to Illness  Recent Flowsheet Documentation  Taken 10/2/2024 0851 by Vidya Mendes LPN  Supportive Measures: active listening utilized     Problem: Bleeding (Sepsis/Septic Shock)  Goal: Absence of Bleeding  Outcome: Progressing     Problem: Glycemic Control Impaired (Sepsis/Septic Shock)  Goal: Blood Glucose Level Within Desired Range  Outcome: Progressing  Intervention: Optimize Glycemic Control  Recent Flowsheet Documentation  Taken 10/2/2024 0851 by Vidya Mendes LPN  Glycemic Management: blood glucose monitored     Problem: Infection Progression (Sepsis/Septic Shock)  Goal: Absence of Infection Signs and Symptoms  Outcome: Progressing  Intervention: Initiate Sepsis Management  Recent Flowsheet Documentation  Taken 10/2/2024 1036 by Vidya Mendes LPN  Infection Prevention:   cohorting utilized   single patient room provided   rest/sleep promoted   personal protective equipment utilized   hand hygiene promoted   equipment surfaces disinfected  Isolation  Precautions: precautions maintained  Taken 10/2/2024 0851 by Vidya Mendes LPN  Infection Prevention:   cohorting utilized   hand hygiene promoted   single patient room provided   personal protective equipment utilized   rest/sleep promoted  Isolation Precautions: precautions maintained  Intervention: Promote Recovery  Recent Flowsheet Documentation  Taken 10/2/2024 0851 by Vidya Mendes LPN  Activity Management: up in chair  Intervention: Promote Stabilization  Recent Flowsheet Documentation  Taken 10/2/2024 0851 by Vidya Mendes LPN  Fluid/Electrolyte Management: fluids provided     Problem: Nutrition Impaired (Sepsis/Septic Shock)  Goal: Optimal Nutrition Intake  Outcome: Progressing   Pt had a echocardiogram, and ECG done due to going in to A-Fib. Pt is SOA when getting up to walk/ bed side commode is on 5L O2. when tiered pt becomes quit lethargic. Call light with in reach.

## 2024-10-02 NOTE — THERAPY TREATMENT NOTE
"Subjective: Pt sitting in chair upon arrival. Agreeable to therapeutic plan of care.    Objective:     Bed mobility - Sitting to supine Min A for BLE management. Elevated HOB, as pt reports poor tolerance to laying flat.     Transfers - STS from chair CGA and with rolling walker    Ambulation - 5 feet CGA and with rolling walker    Vitals: WNL on 5L    Pain: 0 VAS   Location:   Intervention for pain: N/A    Education: Provided education on the importance of mobility in the acute care setting, Verbal/Tactile Cues, Transfer Training, and Gait Training    Assessment: Nieves Mc presents with functional mobility impairments which indicate the need for skilled intervention. Pt up in chair upon arrival, with RN Reporting she needs to transfer to bed for Echo. Pt stands with CGA and ambulates to EOB with CGA using RW. Min A required to transition from sitting to semifowler position. Pt's anxiety significantly increased once in bed, with pt reporting she always has trouble breathing and will not be able to lay flat for echo. Pt left in semifowler position with nursing staff and Echo  present. Will continue to follow and progress as tolerated.     Plan/Recommendations:   If medically appropriate, Low Intensity Therapy recommended post-acute care - This is recommended as therapy feels this patient would require 2-3 visits per week. OP or HH would be the best option depending on patient's home bound status. Consider, if the patient has other  \"skilled\" needs such as wounds, IV antibiotics, etc. Combined with \"low intensity\" could also equate to a SNF. If patient is medically complex, consider LTAC. Pt requires no DME at discharge.     Pt desires Home with family assist and Home Health at discharge. Pt cooperative; agreeable to therapeutic recommendations and plan of care.         Basic Mobility 6-click:  Rollin = Total, A lot = 2, A little = 3; 4 = None  Supine>Sit:   1 = Total, A lot = 2, A little " = 3; 4 = None   Sit>Stand with arms:  1 = Total, A lot = 2, A little = 3; 4 = None  Bed>Chair:   1 = Total, A lot = 2, A little = 3; 4 = None  Ambulate in room:  1 = Total, A lot = 2, A little = 3; 4 = None  3-5 Steps with railin = Total, A lot = 2, A little = 3; 4 = None  Score: 20    Modified [place]: N/A = No pre-op stroke/TIA    Post-Tx Position: Up in Chair, Alarms activated, and Call light and personal items within reach  PPE: gloves, surgical mask, and gown

## 2024-10-02 NOTE — PROGRESS NOTES
[facility] MEDICINE SERVICE  DAILY PROGRESS NOTE    NAME: Nieves Mc  : 1975  MRN: [id number]      LOS: 5 days     PROVIDER OF SERVICE: Gilda Hernandez MD    Chief Complaint: Acute hypoxemic respiratory failure    Subjective:     Interval History:  History taken from: patient      History of Present Illness: Nieves Mc is a 48 y.o. female with a PMH of CML on ponatinib, chronic anemia, leukopenia on bilateral subdural hematoma, systolic CHF, Diabetes type 1, PE on xarelto, anxiety who presented to [facility] on [facility]24 with shhypoxia. Patient was evaluated earlier at cancer center for a blood transfusion, was then found to be hypoxic SPO2 76 %. Patient was subsequently sent to ED for evaluation. Denies chest pain, fever chills, hemoptysis. She also denies SOB. Reports bilateral LE swelling.  Evaluation in ED patient saturating 86 %, requiring 3 L NC to maintain SPO2 > 90 %. Tachycardic in mid 110s, afebrile, slightly hypertensive. CXR done showed patchy right greater than left airspace opacities. CBC labs notable for neutropenia, Hb 7.7. Chemistry labs T bili 1.8, creat 1.29, BUN 34. HS trop marginally elevated 17, proBNP 4.257. The decision to admit was made.      24 seen in bd NAD, C/O dyspnea, on 5lts oxygen  24 still c/o dyspnea on 5 lts, bp stable, will likely need Oxygen on dc. GIRMA RN  24 patient seen and examined in bed no acute distress, heart rate 133, dyspnea on 5 L, hemoglobin 7, will transfuse 1 units of packed RBC, discussed with RN.  10/1: received 1PRBC  10/2: Hb stable post transfusion, denies any new complaints     Review of Systems  Constitutional:  Positive for fatigue. Negative for fever.   HENT:  Positive for congestion.    Respiratory:  Positive for cough and shortness of breath. Negative for chest tightness.    Cardiovascular:  Negative for chest pain.   Gastrointestinal:  Negative for abdominal pain.   Genitourinary:  Negative for  dysuria.   Psychiatric/Behavioral:  Negative for confusion.    Objective:     Vital Signs  Temp:  [95 °F (35 °C)-98.4 °F (36.9 °C)] 96.5 °F (35.8 °C)  Heart Rate:  [] 109  Resp:  [16-22] 16  BP: (117-139)/() 139/116  Flow (L/min):  [5] 5   Body mass index is 25.06 kg/m².    Physical Exam  General: Alert and oriented, no acute distress. Pale looking  HENT: Normocephalic, moist oral mucosa, no scleral icterus.  Neck: Supple, nontender, no carotid bruits, no JVD, no LAD.  Lungs: bilateral rales   Heart: RRR, no murmur, gallop or edema.  Abdomen: Soft, nontender, nondistended, + bowel sounds.  Musculoskeletal: pedal edema   Neuro: alert and awake, moving all 4 extremities   Skin: Skin is warm, dry and pink, no rashes or lesions.  Psychiatric: Cooperative, appropriate mood and affect.         Diagnostic Data    Results from last 7 days   Lab Units 10/02/24  0551   WBC 10*3/mm3 12.86*   HEMOGLOBIN g/dL 8.3*   HEMATOCRIT % 27.5*   PLATELETS 10*3/mm3 216   GLUCOSE mg/dL 165*   CREATININE mg/dL 1.00   BUN mg/dL 37*   SODIUM mmol/L 141   POTASSIUM mmol/L 5.3*   AST (SGOT) U/L 15   ALT (SGPT) U/L 18   ALK PHOS U/L 449*   BILIRUBIN mg/dL 0.6   ANION GAP mmol/L 10.1       XR Chest 1 View    Result Date: 10/1/2024  Impression: Stable patchy multifocal airspace infiltrates interstitial changes throughout the lungs bilaterally. Electronically Signed: Brian Marquez MD  10/1/2024 2:25 PM EDT  Workstation ID: [id number]     Scheduled Meds:acetaminophen, 650 mg, Oral, Once   Or  acetaminophen, 650 mg, Oral, Once   Or  acetaminophen, 650 mg, Rectal, Once  furosemide, 40 mg, Intravenous, BID  gabapentin, 800 mg, Oral, Q8H  insulin glargine, 28 Units, Subcutaneous, Nightly  insulin lispro, 2-7 Units, Subcutaneous, 4x Daily AC & at Bedtime  insulin lispro, 7 Units, Subcutaneous, TID With Meals  losartan, 12.5 mg, Oral, Q24H  metoprolol succinate XL, 50 mg, Oral, Q12H  mirtazapine, 15 mg, Oral, Nightly  [Held by provider]  rivaroxaban, 10 mg, Oral, Daily  sodium chloride, 10 mL, Intravenous, Q12H  tiZANidine, 4 mg, Oral, Daily      Continuous Infusions:pain,       PRN Meds:.  acetaminophen **OR** acetaminophen **OR** acetaminophen    ALPRAZolam    senna-docusate sodium **AND** polyethylene glycol **AND** bisacodyl **AND** bisacodyl    Calcium Replacement - Follow Nurse / BPA Driven Protocol    dextrose    dextrose    Diclofenac Sodium    glucagon (human recombinant)    hydrALAZINE    Magnesium Standard Dose Replacement - Follow Nurse / BPA Driven Protocol    ondansetron    Phosphorus Replacement - Follow Nurse / BPA Driven Protocol    Potassium Replacement - Follow Nurse / BPA Driven Protocol    [COMPLETED] Insert Peripheral IV **AND** sodium chloride    sodium chloride    sodium chloride     I reviewed the patient's new clinical results.    Assessment/Plan:     Active and Resolved Problems  Active Hospital Problems    Diagnosis  POA    **Acute hypoxemic respiratory failure [J96.01]  Yes    Acute on chronic HFrEF (heart failure with reduced ejection fraction) [I50.23]  Yes    Sinus tachycardia [R00.0]  Yes    Anemia due to chemotherapy [D64.81, T45.1X5A]  Yes    NICM (nonischemic cardiomyopathy) [I42.8]  Yes    Type 2 diabetes mellitus with diabetic polyneuropathy, with long-term current use of insulin [E11.42, Z79.4]  Not Applicable    Medically noncompliant [Z91.199]  Not Applicable    History of pulmonary embolism [Z86.711]  Yes    CML (chronic myelocytic leukemia) [C92.10]  Yes      Resolved Hospital Problems   No resolved problems to display.     Acute hypoxemic respiratory failure due to Community Acquired Pneumonia and acute on chronic diastolic CHF  Patient requiring 5 L NC oxygen supplementation to maintain SPO2 > 90 %  CXR showed bilateral airspace opacities R>L  Continue cefepime   Follow blood and sputum culture, atypical workup- NGTD  Repeat Xray to see interval change - no acute changes    Acute on chronic HFrEF (heart  failure with reduced ejection fraction) secondary to NICM (nonischemic cardiomyopathy)  Bilateral lower extremity edema  Cardiology consulted  Previous EF 26-30% in Nov. 2022,  Repeat echocardiogram in March 2024 showed improvement in LV function with EF of 62%  Patient has been noncompliant with medications and outpatient follow up  High-sensitivity troponin of 17 and 22, proBNP of 4000  Start Lasix  Monitor I's and O's and replace electrolytes as needed  Creatinine 1.13  Resume losartan, Toprol-XL and Jardiance  Pro BNP 4257>2153  -lasix 20 mg IV bid  TTE on 3/21/24 showed LVEF > 62 %, was 44 % on 3/10/24  Wean oxygen as tolerated     Type 1 DM  Continue insulin lantus 28 units HS  Continue lispro 7 units before meals plus SS     CML (chronic myelocytic leukemia)  On chemotherapy, followed by oncology   Chronic anemia  Neutropenia  Completed blood transfusion prior to coming to ED  Continue Ponatinib  Transfuse blood products as needed       History of anxiety  Continue Remeron   Continue Xanax    Sinus tachycardia-Due to underlying infection, anemia and hypoxia  Per cardiology Treat underlying causes  Increased Toprol-XL to twice daily    Medically noncompliant  Compliance encouraged  / consulted   HF Nurse Navigator consulted as above       VTE Prophylaxis:  Pharmacologic VTE prophylaxis orders are present.      Disposition Planning:   Barriers to Discharge:dyspnea  Anticipated Date of Discharge: 10/4  Place of Discharge: home      Time: 40 minutes     Code Status and Medical Interventions: CPR (Attempt to Resuscitate); Full Support   Ordered at: 09/28/24 0443     Code Status (Patient has no pulse and is not breathing):    CPR (Attempt to Resuscitate)     Medical Interventions (Patient has pulse or is breathing):    Full Support       Signature: Electronically signed by Gilda Hernandez MD, 10/02/24, 14:22 EDT.  [facility] Hospitalist Team

## 2024-10-02 NOTE — CASE MANAGEMENT/SOCIAL WORK
Continued Stay Note  ZOHAIB Amor     Patient Name: Nieves Mc  MRN: [id number]  Today's Date: 10/2/2024    Admit Date: 9/27/2024    Plan: DC PLAN: Routine home. Referral to [facility]. (Will need order) Watch for 6min walk       Discharge Plan       Row Name 10/02/24 0955       Plan    Plan DC PLAN: Routine home. Referral to [facility]. (Will need order) Watch for 6min walk        Patient/Family in Agreement with Plan yes    Plan Comments DC BARRIERS: 02 5L IV Lasix, IV abx. Possible discharge tomorrow.                      Expected Discharge Date and Time       Expected Discharge Date Expected Discharge Time    Oct 2, 2024           Sharon Lovett RN   Case Management  [phone]

## 2024-10-02 NOTE — PROGRESS NOTES
Demographics verified. Pt is agreeable to services. Does have a caregiving service that comes for attendant care only. Pt has a secondary phone to be used for visits: [phone]    Nursing     Via secure chat, Dr. Pankaj Stover is agreeable to sign the plan of care and follow for home health orders    Pharmacy: [facility] [address] in [place]

## 2024-10-02 NOTE — PLAN OF CARE
"Assessment: Nieves Mc presents with functional mobility impairments which indicate the need for skilled intervention. Pt up in chair upon arrival, with RN Reporting she needs to transfer to bed for Echo. Pt stands with CGA and ambulates to EOB with CGA using RW. Min A required to transition from sitting to semifowler position. Pt's anxiety significantly increased once in bed, with pt reporting she always has trouble breathing and will not be able to lay flat for echo. Pt left in semifowler position with nursing staff and Echo  present. Will continue to follow and progress as tolerated.     Plan/Recommendations:   If medically appropriate, Low Intensity Therapy recommended post-acute care - This is recommended as therapy feels this patient would require 2-3 visits per week. OP or HH would be the best option depending on patient's home bound status. Consider, if the patient has other  \"skilled\" needs such as wounds, IV antibiotics, etc. Combined with \"low intensity\" could also equate to a SNF. If patient is medically complex, consider LTAC. Pt requires no DME at discharge.     Pt desires Home with family assist and Home Health at discharge. Pt cooperative; agreeable to therapeutic recommendations and plan of care.     "

## 2024-10-02 NOTE — PLAN OF CARE
Goal Outcome Evaluation:  Plan of Care Reviewed With: patient           Outcome Evaluation: Pt has slept throughout the night, no c/o pain or discomfort at this time. Pt remains on 5L of O2 at this time. Currently getting IV Lasix and IV abx. Cardio following, will continue to monitor.

## 2024-10-03 ENCOUNTER — APPOINTMENT (OUTPATIENT)
Dept: CARDIOLOGY | Facility: HOSPITAL | Age: 49
DRG: 193 | End: 2024-10-03
Payer: MEDICARE

## 2024-10-03 LAB
ALBUMIN SERPL-MCNC: 3.6 G/DL (ref 3.5–5.2)
ALBUMIN/GLOB SERPL: 0.9 G/DL
ALP SERPL-CCNC: 425 U/L (ref 39–117)
ALT SERPL W P-5'-P-CCNC: 16 U/L (ref 1–33)
ANION GAP SERPL CALCULATED.3IONS-SCNC: 11.1 MMOL/L (ref 5–15)
ANISOCYTOSIS BLD QL: ABNORMAL
AST SERPL-CCNC: 17 U/L (ref 1–32)
BASOPHILS # BLD MANUAL: 0.18 10*3/MM3 (ref 0–0.2)
BASOPHILS NFR BLD MANUAL: 1 % (ref 0–1.5)
BH CV LOWER VASCULAR LEFT COMMON FEMORAL AUGMENT: NORMAL
BH CV LOWER VASCULAR LEFT COMMON FEMORAL COMPETENT: NORMAL
BH CV LOWER VASCULAR LEFT COMMON FEMORAL COMPRESS: NORMAL
BH CV LOWER VASCULAR LEFT COMMON FEMORAL PHASIC: NORMAL
BH CV LOWER VASCULAR LEFT COMMON FEMORAL SPONT: NORMAL
BH CV LOWER VASCULAR LEFT DISTAL FEMORAL COMPRESS: NORMAL
BH CV LOWER VASCULAR LEFT GASTRONEMIUS COMPRESS: NORMAL
BH CV LOWER VASCULAR LEFT GREATER SAPH AK COMPRESS: NORMAL
BH CV LOWER VASCULAR LEFT GREATER SAPH BK COMPRESS: NORMAL
BH CV LOWER VASCULAR LEFT LESSER SAPH COMPRESS: NORMAL
BH CV LOWER VASCULAR LEFT MID FEMORAL AUGMENT: NORMAL
BH CV LOWER VASCULAR LEFT MID FEMORAL COMPETENT: NORMAL
BH CV LOWER VASCULAR LEFT MID FEMORAL COMPRESS: NORMAL
BH CV LOWER VASCULAR LEFT MID FEMORAL PHASIC: NORMAL
BH CV LOWER VASCULAR LEFT MID FEMORAL SPONT: NORMAL
BH CV LOWER VASCULAR LEFT PERONEAL COMPRESS: NORMAL
BH CV LOWER VASCULAR LEFT POPLITEAL AUGMENT: NORMAL
BH CV LOWER VASCULAR LEFT POPLITEAL COMPETENT: NORMAL
BH CV LOWER VASCULAR LEFT POPLITEAL COMPRESS: NORMAL
BH CV LOWER VASCULAR LEFT POPLITEAL PHASIC: NORMAL
BH CV LOWER VASCULAR LEFT POPLITEAL SPONT: NORMAL
BH CV LOWER VASCULAR LEFT POSTERIOR TIBIAL COMPRESS: NORMAL
BH CV LOWER VASCULAR LEFT PROXIMAL FEMORAL COMPRESS: NORMAL
BH CV LOWER VASCULAR LEFT SAPHENOFEMORAL JUNCTION COMPRESS: NORMAL
BH CV LOWER VASCULAR RIGHT COMMON FEMORAL AUGMENT: NORMAL
BH CV LOWER VASCULAR RIGHT COMMON FEMORAL COMPETENT: NORMAL
BH CV LOWER VASCULAR RIGHT COMMON FEMORAL COMPRESS: NORMAL
BH CV LOWER VASCULAR RIGHT COMMON FEMORAL PHASIC: NORMAL
BH CV LOWER VASCULAR RIGHT COMMON FEMORAL SPONT: NORMAL
BH CV LOWER VASCULAR RIGHT DISTAL FEMORAL COMPRESS: NORMAL
BH CV LOWER VASCULAR RIGHT GASTRONEMIUS COMPRESS: NORMAL
BH CV LOWER VASCULAR RIGHT GREATER SAPH AK COMPRESS: NORMAL
BH CV LOWER VASCULAR RIGHT GREATER SAPH BK COMPRESS: NORMAL
BH CV LOWER VASCULAR RIGHT LESSER SAPH COMPRESS: NORMAL
BH CV LOWER VASCULAR RIGHT MID FEMORAL AUGMENT: NORMAL
BH CV LOWER VASCULAR RIGHT MID FEMORAL COMPETENT: NORMAL
BH CV LOWER VASCULAR RIGHT MID FEMORAL COMPRESS: NORMAL
BH CV LOWER VASCULAR RIGHT MID FEMORAL PHASIC: NORMAL
BH CV LOWER VASCULAR RIGHT MID FEMORAL SPONT: NORMAL
BH CV LOWER VASCULAR RIGHT PERONEAL COMPRESS: NORMAL
BH CV LOWER VASCULAR RIGHT POPLITEAL AUGMENT: NORMAL
BH CV LOWER VASCULAR RIGHT POPLITEAL COMPETENT: NORMAL
BH CV LOWER VASCULAR RIGHT POPLITEAL COMPRESS: NORMAL
BH CV LOWER VASCULAR RIGHT POPLITEAL PHASIC: NORMAL
BH CV LOWER VASCULAR RIGHT POPLITEAL SPONT: NORMAL
BH CV LOWER VASCULAR RIGHT POSTERIOR TIBIAL COMPRESS: NORMAL
BH CV LOWER VASCULAR RIGHT PROXIMAL FEMORAL COMPRESS: NORMAL
BH CV LOWER VASCULAR RIGHT SAPHENOFEMORAL JUNCTION COMPRESS: NORMAL
BILIRUB SERPL-MCNC: 0.5 MG/DL (ref 0–1.2)
BLASTS NFR BLD MANUAL: 52 % (ref 0–0)
BUN SERPL-MCNC: 38 MG/DL (ref 6–20)
BUN/CREAT SERPL: 32.5 (ref 7–25)
CALCIUM SPEC-SCNC: 9.3 MG/DL (ref 8.6–10.5)
CHLORIDE SERPL-SCNC: 105 MMOL/L (ref 98–107)
CO2 SERPL-SCNC: 21.9 MMOL/L (ref 22–29)
CREAT SERPL-MCNC: 1.17 MG/DL (ref 0.57–1)
DEPRECATED RDW RBC AUTO: 63.6 FL (ref 37–54)
EGFRCR SERPLBLD CKD-EPI 2021: 57.7 ML/MIN/1.73
ERYTHROCYTE [DISTWIDTH] IN BLOOD BY AUTOMATED COUNT: 18.6 % (ref 12.3–15.4)
GLOBULIN UR ELPH-MCNC: 4.1 GM/DL
GLUCOSE BLDC GLUCOMTR-MCNC: 101 MG/DL (ref 70–105)
GLUCOSE BLDC GLUCOMTR-MCNC: 123 MG/DL (ref 70–105)
GLUCOSE BLDC GLUCOMTR-MCNC: 200 MG/DL (ref 70–105)
GLUCOSE BLDC GLUCOMTR-MCNC: 206 MG/DL (ref 70–105)
GLUCOSE SERPL-MCNC: 109 MG/DL (ref 65–99)
HCT VFR BLD AUTO: 28.5 % (ref 34–46.6)
HGB BLD-MCNC: 8.6 G/DL (ref 12–15.9)
LARGE PLATELETS: ABNORMAL
LYMPHOCYTES # BLD MANUAL: 2.9 10*3/MM3 (ref 0.7–3.1)
LYMPHOCYTES NFR BLD MANUAL: 2 % (ref 5–12)
MCH RBC QN AUTO: 28.2 PG (ref 26.6–33)
MCHC RBC AUTO-ENTMCNC: 30.2 G/DL (ref 31.5–35.7)
MCV RBC AUTO: 93.4 FL (ref 79–97)
MONOCYTES # BLD: 0.36 10*3/MM3 (ref 0.1–0.9)
MYELOCYTES NFR BLD MANUAL: 11 % (ref 0–0)
NEUTROPHILS # BLD AUTO: 0.54 10*3/MM3 (ref 1.7–7)
NEUTROPHILS NFR BLD MANUAL: 3 % (ref 42.7–76)
PLATELET # BLD AUTO: 201 10*3/MM3 (ref 140–450)
PMV BLD AUTO: 10.2 FL (ref 6–12)
POTASSIUM SERPL-SCNC: 5.2 MMOL/L (ref 3.5–5.2)
PROMYELOCYTES NFR BLD MANUAL: 15 % (ref 0–0)
PROT SERPL-MCNC: 7.7 G/DL (ref 6–8.5)
RBC # BLD AUTO: 3.05 10*6/MM3 (ref 3.77–5.28)
SCAN SLIDE: NORMAL
SODIUM SERPL-SCNC: 138 MMOL/L (ref 136–145)
VARIANT LYMPHS NFR BLD MANUAL: 16 % (ref 19.6–45.3)
WBC MORPH BLD: NORMAL
WBC NRBC COR # BLD AUTO: 18.13 10*3/MM3 (ref 3.4–10.8)

## 2024-10-03 PROCEDURE — 93970 EXTREMITY STUDY: CPT

## 2024-10-03 PROCEDURE — 25010000002 FUROSEMIDE PER 20 MG: Performed by: INTERNAL MEDICINE

## 2024-10-03 PROCEDURE — 25010000002 ONDANSETRON PER 1 MG: Performed by: NURSE PRACTITIONER

## 2024-10-03 PROCEDURE — 85025 COMPLETE CBC W/AUTO DIFF WBC: CPT | Performed by: NURSE PRACTITIONER

## 2024-10-03 PROCEDURE — 99232 SBSQ HOSP IP/OBS MODERATE 35: CPT | Performed by: INTERNAL MEDICINE

## 2024-10-03 PROCEDURE — 63710000001 INSULIN GLARGINE PER 5 UNITS: Performed by: STUDENT IN AN ORGANIZED HEALTH CARE EDUCATION/TRAINING PROGRAM

## 2024-10-03 PROCEDURE — 80053 COMPREHEN METABOLIC PANEL: CPT | Performed by: NURSE PRACTITIONER

## 2024-10-03 PROCEDURE — 82948 REAGENT STRIP/BLOOD GLUCOSE: CPT

## 2024-10-03 PROCEDURE — 93970 EXTREMITY STUDY: CPT | Performed by: SURGERY

## 2024-10-03 PROCEDURE — 82948 REAGENT STRIP/BLOOD GLUCOSE: CPT | Performed by: STUDENT IN AN ORGANIZED HEALTH CARE EDUCATION/TRAINING PROGRAM

## 2024-10-03 PROCEDURE — 63710000001 INSULIN LISPRO (HUMAN) PER 5 UNITS: Performed by: STUDENT IN AN ORGANIZED HEALTH CARE EDUCATION/TRAINING PROGRAM

## 2024-10-03 PROCEDURE — 85007 BL SMEAR W/DIFF WBC COUNT: CPT | Performed by: NURSE PRACTITIONER

## 2024-10-03 RX ADMIN — METOPROLOL SUCCINATE 50 MG: 50 TABLET, EXTENDED RELEASE ORAL at 20:49

## 2024-10-03 RX ADMIN — Medication 10 ML: at 20:49

## 2024-10-03 RX ADMIN — INSULIN LISPRO 3 UNITS: 100 INJECTION, SOLUTION INTRAVENOUS; SUBCUTANEOUS at 20:48

## 2024-10-03 RX ADMIN — FUROSEMIDE 40 MG: 10 INJECTION, SOLUTION INTRAMUSCULAR; INTRAVENOUS at 17:20

## 2024-10-03 RX ADMIN — ALPRAZOLAM 0.5 MG: 0.5 TABLET ORAL at 20:55

## 2024-10-03 RX ADMIN — MIRTAZAPINE 15 MG: 15 TABLET, FILM COATED ORAL at 20:49

## 2024-10-03 RX ADMIN — ONDANSETRON 4 MG: 2 INJECTION, SOLUTION INTRAMUSCULAR; INTRAVENOUS at 13:31

## 2024-10-03 RX ADMIN — INSULIN LISPRO 7 UNITS: 100 INJECTION, SOLUTION INTRAVENOUS; SUBCUTANEOUS at 17:20

## 2024-10-03 RX ADMIN — INSULIN LISPRO 3 UNITS: 100 INJECTION, SOLUTION INTRAVENOUS; SUBCUTANEOUS at 17:19

## 2024-10-03 RX ADMIN — SODIUM ZIRCONIUM CYCLOSILICATE 10 G: 10 POWDER, FOR SUSPENSION ORAL at 10:27

## 2024-10-03 RX ADMIN — INSULIN GLARGINE 28 UNITS: 100 INJECTION, SOLUTION SUBCUTANEOUS at 20:48

## 2024-10-03 RX ADMIN — GABAPENTIN 800 MG: 400 CAPSULE ORAL at 14:16

## 2024-10-03 RX ADMIN — ACETAMINOPHEN 650 MG: 325 TABLET, FILM COATED ORAL at 05:51

## 2024-10-03 RX ADMIN — FUROSEMIDE 40 MG: 10 INJECTION, SOLUTION INTRAMUSCULAR; INTRAVENOUS at 05:28

## 2024-10-03 RX ADMIN — GABAPENTIN 800 MG: 400 CAPSULE ORAL at 05:28

## 2024-10-03 RX ADMIN — Medication 10 ML: at 08:23

## 2024-10-03 RX ADMIN — GABAPENTIN 800 MG: 400 CAPSULE ORAL at 20:49

## 2024-10-03 RX ADMIN — INSULIN LISPRO 7 UNITS: 100 INJECTION, SOLUTION INTRAVENOUS; SUBCUTANEOUS at 08:24

## 2024-10-03 NOTE — PLAN OF CARE
Goal Outcome Evaluation:         Patient alert and oriented. Voices needs. Episode of nausea and vomiting. Treated per MAR. Venous duplex completed this shift. No abnormalities found.

## 2024-10-03 NOTE — PROGRESS NOTES
[facility] MEDICINE SERVICE  DAILY PROGRESS NOTE    NAME: Nieves Mc  : 1975  MRN: [id number]      LOS: 6 days     PROVIDER OF SERVICE: Gilda Hernandez MD    Chief Complaint: Acute hypoxemic respiratory failure    Subjective:     Interval History:  History taken from: patient      History of Present Illness: Nieves Mc is a 48 y.o. female with a PMH of CML on ponatinib, chronic anemia, leukopenia on bilateral subdural hematoma, systolic CHF, Diabetes type 1, PE on xarelto, anxiety who presented to [facility] on 2024 with shhypoxia. Patient was evaluated earlier at cancer center for a blood transfusion, was then found to be hypoxic SPO2 76 %. Patient was subsequently sent to ED for evaluation. Denies chest pain, fever chills, hemoptysis. She also denies SOB. Reports bilateral LE swelling.  Evaluation in ED patient saturating 86 %, requiring 3 L NC to maintain SPO2 > 90 %. Tachycardic in mid 110s, afebrile, slightly hypertensive. CXR done showed patchy right greater than left airspace opacities. CBC labs notable for neutropenia, Hb 7.7. Chemistry labs T bili 1.8, creat 1.29, BUN 34. HS trop marginally elevated 17, proBNP 4.257. The decision to admit was made.      24 seen in bd NAD, C/O dyspnea, on 5lts oxygen  24 still c/o dyspnea on 5 lts, bp stable, will likely need Oxygen on dc. GIRMA RN  24 patient seen and examined in bed no acute distress, heart rate 133, dyspnea on 5 L, hemoglobin 7, will transfuse 1 units of packed RBC, discussed with RN.  10/1: received 1PRBC  10/2: Hb stable post transfusion, denies any new complaints   10/3: denies any new complaints, short of breath on exertion.     Review of Systems  Constitutional:  Positive for fatigue. Negative for fever.   HENT:  Positive for congestion.    Respiratory:  Positive for cough and shortness of breath. Negative for chest tightness.    Cardiovascular:  Negative for chest pain.   Gastrointestinal:   Negative for abdominal pain.   Genitourinary:  Negative for dysuria.   Psychiatric/Behavioral:  Negative for confusion.    Objective:     Vital Signs  Temp:  [96.8 °F (36 °C)-97.9 °F (36.6 °C)] 97.9 °F (36.6 °C)  Heart Rate:  [] 94  Resp:  [16-31] 16  BP: ()/(58-77) 112/74  Flow (L/min):  [5] 5   Body mass index is 25.06 kg/m².    Physical Exam  General: Alert and oriented, no acute distress. Pale looking  HENT: Normocephalic, moist oral mucosa, no scleral icterus.  Neck: Supple, nontender, no carotid bruits, no JVD, no LAD.  Lungs: bilateral rales   Heart: RRR, no murmur, gallop or edema.  Abdomen: Soft, nontender, nondistended, + bowel sounds.  Musculoskeletal: pedal edema   Neuro: alert and awake, moving all 4 extremities   Skin: Skin is warm, dry and pink, no rashes or lesions.  Psychiatric: Cooperative, appropriate mood and affect.         Diagnostic Data    Results from last 7 days   Lab Units 10/03/24  0546   WBC 10*3/mm3 18.13*   HEMOGLOBIN g/dL 8.6*   HEMATOCRIT % 28.5*   PLATELETS 10*3/mm3 201   GLUCOSE mg/dL 109*   CREATININE mg/dL 1.17*   BUN mg/dL 38*   SODIUM mmol/L 138   POTASSIUM mmol/L 5.2   AST (SGOT) U/L 17   ALT (SGPT) U/L 16   ALK PHOS U/L 425*   BILIRUBIN mg/dL 0.5   ANION GAP mmol/L 11.1       XR Chest 1 View    Result Date: 10/1/2024  Impression: Stable patchy multifocal airspace infiltrates interstitial changes throughout the lungs bilaterally. Electronically Signed: Brian Marquez MD  10/1/2024 2:25 PM EDT  Workstation ID: [id number]     Scheduled Meds:acetaminophen, 650 mg, Oral, Once   Or  acetaminophen, 650 mg, Oral, Once   Or  acetaminophen, 650 mg, Rectal, Once  furosemide, 40 mg, Intravenous, BID  gabapentin, 800 mg, Oral, Q8H  insulin glargine, 28 Units, Subcutaneous, Nightly  insulin lispro, 2-7 Units, Subcutaneous, 4x Daily AC & at Bedtime  insulin lispro, 7 Units, Subcutaneous, TID With Meals  losartan, 12.5 mg, Oral, Q24H  metoprolol succinate XL, 50 mg, Oral,  Q12H  mirtazapine, 15 mg, Oral, Nightly  [Held by provider] rivaroxaban, 10 mg, Oral, Daily  sodium chloride, 10 mL, Intravenous, Q12H  tiZANidine, 4 mg, Oral, Daily      Continuous Infusions:pain,       PRN Meds:.  acetaminophen **OR** acetaminophen **OR** acetaminophen    ALPRAZolam    senna-docusate sodium **AND** polyethylene glycol **AND** bisacodyl **AND** bisacodyl    Calcium Replacement - Follow Nurse / BPA Driven Protocol    dextrose    dextrose    Diclofenac Sodium    glucagon (human recombinant)    hydrALAZINE    Magnesium Standard Dose Replacement - Follow Nurse / BPA Driven Protocol    ondansetron    Phosphorus Replacement - Follow Nurse / BPA Driven Protocol    Potassium Replacement - Follow Nurse / BPA Driven Protocol    [COMPLETED] Insert Peripheral IV **AND** sodium chloride    sodium chloride    sodium chloride     I reviewed the patient's new clinical results.    Assessment/Plan:     Active and Resolved Problems  Active Hospital Problems    Diagnosis  POA    **Acute hypoxemic respiratory failure [J96.01]  Yes    Acute on chronic HFrEF (heart failure with reduced ejection fraction) [I50.23]  Yes    Sinus tachycardia [R00.0]  Yes    Anemia due to chemotherapy [D64.81, T45.1X5A]  Yes    NICM (nonischemic cardiomyopathy) [I42.8]  Yes    Type 2 diabetes mellitus with diabetic polyneuropathy, with long-term current use of insulin [E11.42, Z79.4]  Not Applicable    Medically noncompliant [Z91.199]  Not Applicable    History of pulmonary embolism [Z86.711]  Yes    CML (chronic myelocytic leukemia) [C92.10]  Yes      Resolved Hospital Problems   No resolved problems to display.     Acute hypoxemic respiratory failure due to Community Acquired Pneumonia and acute on chronic diastolic CHF  Patient requiring 5 L NC oxygen supplementation to maintain SPO2 > 90 %  CXR showed bilateral airspace opacities R>L  S/p IV antibiotics  Follow blood and sputum culture, atypical workup- NGTD  Repeat Xray to see interval  change - no acute changes  Consult pulmonary  US LE to r/o DVT    Acute on chronic HFrEF (heart failure with reduced ejection fraction) secondary to NICM (nonischemic cardiomyopathy)  Bilateral lower extremity edema  Cardiology consulted  Previous EF 26-30% in Nov. 2022,  Repeat echocardiogram in March 2024 showed improvement in LV function with EF of 62%  Patient has been noncompliant with medications and outpatient follow up  High-sensitivity troponin of 17 and 22, proBNP of 4000  Start Lasix  Monitor I's and O's and replace electrolytes as needed  Creatinine 1.13  continue losartan, Toprol-XL and Jardiance  Pro BNP 4257>2153  -lasix 20 mg IV bid  TTE on 3/21/24 showed LVEF > 62 %, was 44 % on 3/10/24  Wean oxygen as tolerated     Type 1 DM  Continue insulin lantus 28 units HS  Continue lispro 7 units before meals plus SS     CML (chronic myelocytic leukemia)  On chemotherapy, followed by oncology   Chronic anemia  Completed blood transfusion prior to coming to ED  Continue Ponatinib  Transfuse blood products as needed       History of anxiety  Continue Remeron   Continue Xanax    Sinus tachycardia-Due to underlying infection, anemia and hypoxia  Per cardiology Treat underlying causes  Increased Toprol-XL to twice daily    Medically noncompliant  Compliance encouraged  / consulted   HF Nurse Navigator consulted as above       VTE Prophylaxis:  Pharmacologic VTE prophylaxis orders are present.      Disposition Planning:   Barriers to Discharge:dyspnea  Anticipated Date of Discharge: 10/7  Place of Discharge: home      Time: 40 minutes     Code Status and Medical Interventions: CPR (Attempt to Resuscitate); Full Support   Ordered at: 09/28/24 0443     Code Status (Patient has no pulse and is not breathing):    CPR (Attempt to Resuscitate)     Medical Interventions (Patient has pulse or is breathing):    Full Support       Signature: Electronically signed by Gilda Hernandez MD,  10/03/24, 11:59 EDT.  [facility] Hospitalist Team

## 2024-10-03 NOTE — CONSULTS
"Heart Failure Program  Nurse Navigator  Discharge Planning    Patient Name:Nieves Mc  :1975  Cardiologist:Manuel  Current Admission Date: 2024   Previous Admission:   Admission frequency: 1 admissions in 6 months    Heart Failure history per record:    Symptoms on admission:c/o SOA, swelling legs and abdomen, O2 sats 70% after receiving outpatient blood transfusion. Pt advises her swelling is \"all of the time\" but worsen on admission. Pt advises she is on diuretics at home and takes them daily but they do not help. Pt is current with oncology treatment      Admission Weight:  Flowsheet Rows      Flowsheet Row First Filed Value   Admission Height 160 cm (63\") Documented at 2024 1630   Admission Weight 66.2 kg (146 lb) Documented at 2024 1630              Current Home Medications:  Prior to Admission medications    Medication Sig Start Date End Date Taking? Authorizing Provider   acetaminophen (TYLENOL) 325 MG tablet Take 2 tablets by mouth Every 4 (Four) Hours As Needed for Moderate Pain. Indications: Fever, Pain 24  Yes Meghann Lerma MD   ALPRAZolam (Xanax) 0.5 MG tablet Take 1 tablet by mouth At Night As Needed for Anxiety. Indications: Feeling Anxious 24  Yes Denisha Gill APRN   Diclofenac Sodium (VOLTAREN) 1 % gel gel Apply 4 g topically to the appropriate area as directed 4 (Four) Times a Day As Needed (hip pain). 24  Yes Denisha Gill APRN   doxycycline (VIBRAMYICN) 100 MG tablet Take 1 tablet by mouth 2 (Two) Times a Day for 10 days. 9/26/24 10/6/24 Yes Meghann Lerma MD   furosemide (LASIX) 40 MG tablet Take 1 tablet by mouth Daily. Indications: Edema 24  Yes Pankaj Stover MD   gabapentin (Neurontin) 800 MG tablet Take 1 tablet by mouth 3 (Three) Times a Day. Ordered through [facility]  Indications: Diabetes with Nerve Disease 5/15/24  Yes Pankaj Stover MD   Insulin Glargine (LANTUS SOLOSTAR) 100 UNIT/ML injection " pen Inject 28 Units under the skin into the appropriate area as directed Every Night. Indications: Type 2 Diabetes 9/16/24  Yes Pankaj Stover MD   Insulin Lispro, 1 Unit Dial, (HumaLOG KwikPen) 100 UNIT/ML solution pen-injector Inject 7 Units under the skin into the appropriate area as directed 3 (Three) Times a Day Before Meals. Dx code: E11.65 9/16/24  Yes Pankaj Stover MD   levoFLOXacin (Levaquin) 500 MG tablet Take 1 tablet by mouth Daily. 9/24/24  Yes Meghann Lerma MD   mirtazapine (REMERON) 15 MG tablet Take 1 tablet by mouth Every Night.   Yes Provider, MD Melecio   pain patient supplied pump by Intrathecal route Continuous.   Yes Provider, MD Melecio   PONATinib HCl (Iclusig) 10 MG tablet Take 10 mg by mouth Every Other Day. Take with or without food.  Swallow whole, do not crush or chew. 8/21/24  Yes Meghann Lerma MD   rivaroxaban (Xarelto) 10 MG tablet Take 1 tablet by mouth Daily. 6/11/24  Yes Denisha Gill APRN   tiZANidine (ZANAFLEX) 4 MG tablet Take 1 tablet by mouth Daily. Indications: Musculoskeletal Pain 5/15/24  Yes Pankaj Stover MD       Social history:   Pt from home, lives with spouse and takes care of her grandson.     Smoking status:     Diagnostics Testing:  proBNP level: 4259    Echocardiogram:Results for orders placed during the hospital encounter of 09/27/24    Adult Transthoracic Echo Complete W/ Cont if Necessary Per Protocol    Interpretation Summary    Left ventricular systolic function is normal. Left ventricular ejection fraction appears to be 61 - 65%.    Left ventricular wall thickness is consistent with borderline concentric hypertrophy.    Left ventricular diastolic function was normal.    The right ventricular cavity is borderline dilated.    Estimated right ventricular systolic pressure from tricuspid regurgitation is mildly elevated (35-45 mmHg).    Mild pulmonary hypertension is present.        Patient Assessment:   Pt resp  even and unlabored. Noted 1+ edema lower legs knee down. C/o abd bloating. Pt became nauseated and had vomiting during education, nursing notified.     Current O2: 5L NC  Home O2:      Education provided to patient:  yes- Heart Failure disease education  yes -Symptom identification/management  yes -Daily Weights   - Diet education   yes- Fluid restriction (if ordered)   - Activity education  yes- Medication education  na- Smoking cessation  yes- Follow-up Appointments  yes-Provided information on how to access [facility] My HF Guide/Heart Failure Interactive workbook    Acceptance of learning: cooperative    Heart Failure education interactive teaching session time: 20 minutes    GWTG: EF 62%    Identified needs/barriers:   Volume status, GDMT - losartan, toprol XL, needs 7 day apt and cardiology apt    Intervention:   Education

## 2024-10-03 NOTE — PROGRESS NOTES
Patient is somnolent, hypertensive, tachycardic referring Provider: Gilda Hernandez,*    Reason for follow-up: HFrEF, tachycardia     Patient Care Team:  Pankaj Stover MD as PCP - General (Internal Medicine)  Meghann Lerma MD as Consulting Physician (Hematology and Oncology)  Hamida Feldman MD as Consulting Physician (Cardiology)  Rohan Jackson MD as Cardiologist (Cardiology)      SUBJECTIVE  Patient denies any chest pain however she remains on 5 L of oxygen.     ROS  Review of all systems negative except as indicated.    Since I have last seen, the patient has been without any chest discomfort, shortness of breath, palpitations, dizziness or syncope.  Denies having any headache, abdominal pain, nausea, vomiting, diarrhea, constipation, loss of weight or loss of appetite.  Denies having any excessive bruising, hematuria or blood in the stool.        Personal History:    Past Medical History:   Diagnosis Date    Acute respiratory failure with hypoxia 07/28/2022    Adverse effect of chemotherapy 11/08/2023    Bilateral subdural hematomas 05/18/2023    Bone pain     Chronic constipation 07/07/2023    CML (chronic myelocytic leukemia) 04/01/2018    COVID-19 virus infection 01/12/2022    Diabetes mellitus     Diabetic gastroparesis 07/07/2023    Extremity pain     Carlin. legs pain    Fall during current hospitalization 06/25/2023    Leg pain     left leg greater    Medically noncompliant 03/11/2021    Migraine     Petechial rash 11/08/2023    Pubic ramus fracture, left, closed, initial encounter 06/25/2023    Pulmonary embolism     Traumatic subdural hemorrhage without loss of consciousness 05/17/2023    Tumor lysis syndrome 07/05/2022    UTI (urinary tract infection) 07/06/2023    Vision loss     doing surgery       Past Surgical History:   Procedure Laterality Date    BONE MARROW BIOPSY      BREAST SURGERY      BRONCHOSCOPY N/A 6/6/2022    Procedure: BRONCHOSCOPY bilateral lung washing;   Surgeon: Charlene Camara MD;  Location: [facility] ENDOSCOPY;  Service: Pulmonary;  Laterality: N/A;  post: rule out infection vs transfusion lung injury     SECTION      CHOLECYSTECTOMY      COLONOSCOPY N/A 2023    Procedure: COLONOSCOPY;  Surgeon: Freddy Villeda MD;  Location: [facility] ENDOSCOPY;  Service: Gastroenterology;  Laterality: N/A;  poor prep    ENDOSCOPY N/A 2023    Procedure: ESOPHAGOGASTRODUODENOSCOPY with biopsy x2 areas;  Surgeon: Freddy Villeda MD;  Location: [facility] ENDOSCOPY;  Service: Gastroenterology;  Laterality: N/A;  food in stomach;abnormal duodenal mucosa    EYE SURGERY      laser surgery due  to hemmorage--- 2021-- another surgery  lt eye11/15/21    RETINAL DETACHMENT SURGERY      SPINE SURGERY      Lombardi spinal block    TUBAL ABDOMINAL LIGATION         Family History   Problem Relation Age of Onset    Diabetes Mother     Diabetes Maternal Grandmother     Heart attack Maternal Grandmother     Stroke Maternal Grandmother        Social History     Tobacco Use    Smoking status: Never    Smokeless tobacco: Never   Vaping Use    Vaping status: Never Used   Substance Use Topics    Alcohol use: No    Drug use: No        Home meds:  Prior to Admission medications    Medication Sig Start Date End Date Taking? Authorizing Provider   acetaminophen (TYLENOL) 325 MG tablet Take 2 tablets by mouth Every 4 (Four) Hours As Needed for Moderate Pain. Indications: Fever, Pain 24  Yes Meghann Lerma MD   ALPRAZolam (Xanax) 0.5 MG tablet Take 1 tablet by mouth At Night As Needed for Anxiety. Indications: Feeling Anxious 24  Yes Denisha Gill APRN   Diclofenac Sodium (VOLTAREN) 1 % gel gel Apply 4 g topically to the appropriate area as directed 4 (Four) Times a Day As Needed (hip pain). 24  Yes Denisha Gill APRN   doxycycline (VIBRAMYICN) 100 MG tablet Take 1 tablet by mouth 2 (Two) Times a Day for 10 days. 9/26/24 10/6/24 Yes Meghann Lerma  MD   furosemide (LASIX) 40 MG tablet Take 1 tablet by mouth Daily. Indications: Edema 9/19/24  Yes Pankaj Stover MD   gabapentin (Neurontin) 800 MG tablet Take 1 tablet by mouth 3 (Three) Times a Day. Ordered through [facility]  Indications: Diabetes with Nerve Disease 5/15/24  Yes Pankaj Stover MD   Insulin Glargine (LANTUS SOLOSTAR) 100 UNIT/ML injection pen Inject 28 Units under the skin into the appropriate area as directed Every Night. Indications: Type 2 Diabetes 9/16/24  Yes Pankaj Stover MD   Insulin Lispro, 1 Unit Dial, (HumaLOG KwikPen) 100 UNIT/ML solution pen-injector Inject 7 Units under the skin into the appropriate area as directed 3 (Three) Times a Day Before Meals. Dx code: E11.65 9/16/24  Yes Pankaj Stover MD   levoFLOXacin (Levaquin) 500 MG tablet Take 1 tablet by mouth Daily. 9/24/24  Yes Meghann Lerma MD   mirtazapine (REMERON SOL-TAB) 15 MG disintegrating tablet Place 1 tablet on the tongue Every Night.   Yes Provider, MD Melecio   pain patient supplied pump by Intrathecal route Continuous.   Yes ProviderMelecio MD   PONATinib HCl (Iclusig) 10 MG tablet Take 10 mg by mouth Every Other Day. Take with or without food.  Swallow whole, do not crush or chew. 8/21/24  Yes Meghann Lerma MD   rivaroxaban (Xarelto) 10 MG tablet Take 1 tablet by mouth Daily. 6/11/24  Yes Denisha Gill APRN   tiZANidine (ZANAFLEX) 4 MG tablet Take 1 tablet by mouth Daily. Indications: Musculoskeletal Pain 5/15/24  Yes Pankaj Stover MD       Allergies:  Contrast dye (echo or unknown ct/mr)    Scheduled Meds:acetaminophen, 650 mg, Oral, Once   Or  acetaminophen, 650 mg, Oral, Once   Or  acetaminophen, 650 mg, Rectal, Once  furosemide, 40 mg, Intravenous, BID  gabapentin, 800 mg, Oral, Q8H  insulin glargine, 28 Units, Subcutaneous, Nightly  insulin lispro, 2-7 Units, Subcutaneous, 4x Daily AC & at Bedtime  insulin lispro, 7 Units, Subcutaneous, TID With  "Meals  losartan, 12.5 mg, Oral, Q24H  metoprolol succinate XL, 50 mg, Oral, Q12H  mirtazapine, 15 mg, Oral, Nightly  [Held by provider] rivaroxaban, 10 mg, Oral, Daily  sodium chloride, 10 mL, Intravenous, Q12H  tiZANidine, 4 mg, Oral, Daily      Continuous Infusions:pain,       PRN Meds:.  acetaminophen **OR** acetaminophen **OR** acetaminophen    ALPRAZolam    senna-docusate sodium **AND** polyethylene glycol **AND** bisacodyl **AND** bisacodyl    Calcium Replacement - Follow Nurse / BPA Driven Protocol    dextrose    dextrose    Diclofenac Sodium    glucagon (human recombinant)    hydrALAZINE    Magnesium Standard Dose Replacement - Follow Nurse / BPA Driven Protocol    ondansetron    Phosphorus Replacement - Follow Nurse / BPA Driven Protocol    Potassium Replacement - Follow Nurse / BPA Driven Protocol    [COMPLETED] Insert Peripheral IV **AND** sodium chloride    sodium chloride    sodium chloride      OBJECTIVE    Vital Signs  Vitals:    10/02/24 1603 10/02/24 2038 10/03/24 0000 10/03/24 0429   BP: 138/73 102/67 98/64 115/77   BP Location: Right arm Left arm Right arm Right arm   Patient Position: Sitting Sitting Sitting Lying   Pulse: 112 100 87 95   Resp: (!) 31 22 19 18   Temp: 97 °F (36.1 °C)  97.4 °F (36.3 °C) 97.8 °F (36.6 °C)   TempSrc: Oral  Oral Oral   SpO2: (!) 86% 94% 93% 93%   Weight:       Height:           Flowsheet Rows      Flowsheet Row First Filed Value   Admission Height 160 cm (63\") Documented at 09/27/2024 1630   Admission Weight 66.2 kg (146 lb) Documented at 09/27/2024 1630              Intake/Output Summary (Last 24 hours) at 10/3/2024 0647  Last data filed at 10/2/2024 1900  Gross per 24 hour   Intake --   Output 500 ml   Net -500 ml          Telemetry: Sinus rhythm    Physical Exam:  The patient is alert, oriented and in no distress.  Vital signs as noted above.  Head and neck revealed no carotid bruits or jugular venous distention.  No thyromegaly or lymphadenopathy is " present  Distant and diminished breath sounds.  Heart normal first and second heart sounds.  No murmur. No precordial rub is present.  No gallop is present.  Abdomen soft and nontender.  No organomegaly is present.  Extremities with good peripheral pulses with bilateral lower extremity edema  Skin warm and dry.  Musculoskeletal system is grossly normal.  CNS grossly normal.       Results Review:  I have personally reviewed the results from the time of this admission to 10/3/2024 06:47 EDT and agree with these findings:  []  Laboratory  []  Microbiology  []  Radiology  []  EKG/Telemetry   []  Cardiology/Vascular   []  Pathology  []  Old records  []  Other:    Most notable findings include:    Lab Results (last 24 hours)       Procedure Component Value Units Date/Time    Manual Differential [[id number]] Collected: 10/03/24 0546    Specimen: Blood Updated: 10/03/24 0625    Comprehensive Metabolic Panel [[id number]]  (Abnormal) Collected: 10/03/24 0546    Specimen: Blood Updated: 10/03/24 0624     Glucose 109 mg/dL      BUN 38 mg/dL      Creatinine 1.17 mg/dL      Sodium 138 mmol/L      Potassium 5.2 mmol/L      Chloride 105 mmol/L      CO2 21.9 mmol/L      Calcium 9.3 mg/dL      Total Protein 7.7 g/dL      Albumin 3.6 g/dL      ALT (SGPT) 16 U/L      AST (SGOT) 17 U/L      Alkaline Phosphatase 425 U/L      Total Bilirubin 0.5 mg/dL      Globulin 4.1 gm/dL      A/G Ratio 0.9 g/dL      BUN/Creatinine Ratio 32.5     Anion Gap 11.1 mmol/L      eGFR 57.7 mL/min/1.73     Narrative:      GFR Normal >60  Chronic Kidney Disease <60  Kidney Failure <15      CBC Auto Differential [[id number]]  (Abnormal) Collected: 10/03/24 0546    Specimen: Blood Updated: 10/03/24 0603     WBC 18.13 10*3/mm3      RBC 3.05 10*6/mm3      Hemoglobin 8.6 g/dL      Hematocrit 28.5 %      MCV 93.4 fL      MCH 28.2 pg      MCHC 30.2 g/dL      RDW 18.6 %      RDW-SD 63.6 fl      MPV 10.2 fL      Platelets 201 10*3/mm3      nRBC 1.0 /100 WBC     CBC &  Differential [[id number]]  (Abnormal) Collected: 10/03/24 0546    Specimen: Blood Updated: 10/03/24 0559    Narrative:      The following orders were created for panel order CBC & Differential.  Procedure                               Abnormality         Status                     ---------                               -----------         ------                     CBC Auto Differential[[id number]]        Abnormal            Preliminary result         Scan Slide[[id number]]                                       In process                   Please view results for these tests on the individual orders.    Scan Slide [[id number]] Collected: 10/03/24 0546    Specimen: Blood Updated: 10/03/24 0559    POC Glucose Once [[id number]]  (Abnormal) Collected: 10/02/24 2108    Specimen: Blood Updated: 10/02/24 2110     Glucose 158 mg/dL      Comment: Serial Number: [id number]:  [id number]       POC Glucose 4x Daily Before Meals & at Bedtime [[id number]]  (Abnormal) Collected: 10/02/24 1855    Specimen: Blood Updated: 10/02/24 1858     Glucose 155 mg/dL      Comment: Serial Number: [id number]:  [id number]       Blood Culture - Blood, Arm, Left [[id number]]  (Normal) Collected: 09/27/24 1815    Specimen: Blood from Arm, Left Updated: 10/02/24 1831     Blood Culture No growth at 5 days    Narrative:      Less than seven (7) mL's of blood was collected.  Insufficient quantity may yield false negative results.    POC Glucose 4x Daily Before Meals & at Bedtime [[id number]]  (Abnormal) Collected: 10/02/24 1120    Specimen: Blood Updated: 10/02/24 1122     Glucose 173 mg/dL      Comment: Serial Number: [id number]:  [id number]       POC Glucose 4x Daily Before Meals & at Bedtime [[id number]]  (Abnormal) Collected: 10/02/24 0731    Specimen: Blood Updated: 10/02/24 0733     Glucose 163 mg/dL      Comment: Serial Number: [id number]:  [id number]       CBC & Differential [[id number]]  (Abnormal) Collected: 10/02/24 0551     Specimen: Blood from Arm, Right Updated: 10/02/24 0710    Narrative:      The following orders were created for panel order CBC & Differential.  Procedure                               Abnormality         Status                     ---------                               -----------         ------                     CBC Auto Differential[[id number]]        Abnormal            Final result               Scan Slide[[id number]]                                       Final result                 Please view results for these tests on the individual orders.    Scan Slide [[id number]] Collected: 10/02/24 0551    Specimen: Blood from Arm, Right Updated: 10/02/24 0710     Scan Slide --     Comment: See Manual Differential Results       Manual Differential [[id number]]  (Abnormal) Collected: 10/02/24 0551    Specimen: Blood from Arm, Right Updated: 10/02/24 0710     Neutrophil % 16.0 %      Lymphocyte % 36.0 %      Monocyte % 6.0 %      Basophil % 2.0 %      Bands %  8.0 %      Metamyelocyte % 8.0 %      Myelocyte % 6.0 %      Promyelocyte % 1.0 %      Atypical Lymphocyte % 3.0 %      Blasts % 14.0 %      Neutrophils Absolute 3.09 10*3/mm3      Lymphocytes Absolute 5.02 10*3/mm3      Monocytes Absolute 0.77 10*3/mm3      Basophils Absolute 0.26 10*3/mm3      nRBC 3.0 /100 WBC      Dacrocytes Slight/1+     Polychromasia Slight/1+     WBC Morphology Normal     Platelet Estimate Adequate     Large Platelets Slight/1+    Narrative:      Reviewed by Pathologist within the past 30 days on 09/26/2024 .     CBC Auto Differential [[id number]]  (Abnormal) Collected: 10/02/24 0551    Specimen: Blood from Arm, Right Updated: 10/02/24 0710     WBC 12.86 10*3/mm3      RBC 2.99 10*6/mm3      Hemoglobin 8.3 g/dL      Hematocrit 27.5 %      MCV 92.0 fL      MCH 27.8 pg      MCHC 30.2 g/dL      RDW 18.6 %      RDW-SD 61.7 fl      MPV 10.2 fL      Platelets 216 10*3/mm3     Narrative:      The previously reported component NRBC is no longer being  reported. Previous result was 0.9 /100 WBC (Reference Range: 0.0-0.2 /100 WBC) on 10/2/2024 at 0638 EDT.    Comprehensive Metabolic Panel [[id number]]  (Abnormal) Collected: 10/02/24 0551    Specimen: Blood from Arm, Right Updated: 10/02/24 0658     Glucose 165 mg/dL      BUN 37 mg/dL      Creatinine 1.00 mg/dL      Sodium 141 mmol/L      Potassium 5.3 mmol/L      Chloride 108 mmol/L      CO2 22.9 mmol/L      Calcium 9.3 mg/dL      Total Protein 7.7 g/dL      Albumin 3.7 g/dL      ALT (SGPT) 18 U/L      AST (SGOT) 15 U/L      Alkaline Phosphatase 449 U/L      Total Bilirubin 0.6 mg/dL      Globulin 4.0 gm/dL      A/G Ratio 0.9 g/dL      BUN/Creatinine Ratio 37.0     Anion Gap 10.1 mmol/L      eGFR 69.6 mL/min/1.73     Narrative:      GFR Normal >60  Chronic Kidney Disease <60  Kidney Failure <15              Imaging Results (Last 24 Hours)       ** No results found for the last 24 hours. **            LAB RESULTS (LAST 7 DAYS)    CBC  Results from last 7 days   Lab Units 10/03/24  0546 10/02/24  0551 10/01/24  1732 10/01/24  0308 09/30/24  0401 09/28/24  0511 09/27/24  1724 09/26/24  1347   WBC 10*3/mm3 18.13* 12.86*  --  7.30 5.47 4.30 3.94 4.04   RBC 10*6/mm3 3.05* 2.99*  --  2.50* 2.51* 2.66* 2.68* 2.64*   HEMOGLOBIN g/dL 8.6* 8.3* 8.0* 6.9* 7.0* 7.5* 7.7* 7.6*   HEMATOCRIT % 28.5* 27.5* 26.0* 23.1* 23.3* 24.8* 24.5* 25.2*   MCV fL 93.4 92.0  --  92.4 92.8 93.2 91.4 95.5   PLATELETS 10*3/mm3 201 216  --  219 280 422 448 450       BMP  Results from last 7 days   Lab Units 10/03/24  0546 10/02/24  0551 10/01/24  0308 09/30/24  0401 09/29/24  1810 09/28/24  0511 09/27/24  1724   SODIUM mmol/L 138 141 139 140 141 140 139   POTASSIUM mmol/L 5.2 5.3* 4.8 4.8 4.8 5.3* 4.8   CHLORIDE mmol/L 105 108* 107 106 106 106 104   CO2 mmol/L 21.9* 22.9 22.0 22.4 22.6 23.0 22.9   BUN mg/dL 38* 37* 39* 37* 38* 31* 34*   CREATININE mg/dL 1.17* 1.00 1.12* 1.17* 1.12* 1.13* 1.29*   GLUCOSE mg/dL 109* 165* 131* 163* 162* 91 122*    MAGNESIUM mg/dL  --   --   --   --   --  2.4  --    PHOSPHORUS mg/dL  --   --   --   --   --  4.0  --        CMP   Results from last 7 days   Lab Units 10/03/24  0546 10/02/24  0551 10/01/24  0308 09/30/24  0401 09/29/24  1810 09/28/24  0511 09/27/24  1724   SODIUM mmol/L 138 141 139 140 141 140 139   POTASSIUM mmol/L 5.2 5.3* 4.8 4.8 4.8 5.3* 4.8   CHLORIDE mmol/L 105 108* 107 106 106 106 104   CO2 mmol/L 21.9* 22.9 22.0 22.4 22.6 23.0 22.9   BUN mg/dL 38* 37* 39* 37* 38* 31* 34*   CREATININE mg/dL 1.17* 1.00 1.12* 1.17* 1.12* 1.13* 1.29*   GLUCOSE mg/dL 109* 165* 131* 163* 162* 91 122*   ALBUMIN g/dL 3.6 3.7 3.6 3.6  --  3.9 4.0   BILIRUBIN mg/dL 0.5 0.6 0.7 1.0  --  1.3* 1.8*   ALK PHOS U/L 425* 449* 431* 422*  --  326* 317*   AST (SGOT) U/L 17 15 21 30  --  30 28   ALT (SGPT) U/L 16 18 25 35*  --  24 24       BNP        TROPONIN  Results from last 7 days   Lab Units 09/28/24  0745   HSTROP T ng/L 17*       CoAg  Results from last 7 days   Lab Units 09/27/24  1724   INR  1.06   APTT seconds 34.6*       Creatinine Clearance  Estimated Creatinine Clearance: 55 mL/min (A) (by C-G formula based on SCr of 1.17 mg/dL (H)).    ABG        Radiology  XR Chest 1 View    Result Date: 10/1/2024  Impression: Stable patchy multifocal airspace infiltrates interstitial changes throughout the lungs bilaterally. Electronically Signed: Brian Marquez MD  10/1/2024 2:25 PM EDT  Workstation ID: [id number]       EKG  I personally viewed and interpreted the patient's EKG/Telemetry data:  ECG 12 Lead Rhythm Change   Preliminary Result   HEART JPAU=305  bpm   RR Huswcfyi=504  ms   PA Kfyquqzd=980  ms   P Horizontal Axis=  deg   P Front Axis=11  deg   QRSD Interval=88  ms   QT Qymkfqnh=098  ms   PRaN=140  ms   QRS Axis=-3  deg   T Wave Axis=9  deg   - BORDERLINE ECG -   Sinus tachycardia with irregular rate   Low voltage, extremity leads   Consider anterior infarct   Date and Time of Study:2024-10-02 09:50:00      ECG 12 Lead Tachycardia    Preliminary Result   HEART NNOD=232  bpm   RR Mczxbzss=452  ms   DC Dbkiwxyv=449  ms   P Horizontal Axis=-69  deg   P Front Axis=23  deg   QRSD Interval=89  ms   QT Djycsskx=045  ms   VUhZ=406  ms   QRS Axis=-9  deg   T Wave Axis=29  deg   - BORDERLINE ECG -   Sinus tachycardia   Low voltage, extremity leads   Consider anterior infarct   Date and Time of Study:2024-09-30 00:46:19      ECG 12 Lead Dyspnea   Preliminary Result   HEART NEAO=705  bpm   RR Lecwspzg=188  ms   DC Kojjkzki=732  ms   P Horizontal Axis=7  deg   P Front Axis=54  deg   QRSD Interval=90  ms   QT Ekshiell=547  ms   ATfN=347  ms   QRS Axis=49  deg   T Wave Axis=39  deg   - OTHERWISE NORMAL ECG -   Sinus tachycardia   Low voltage, extremity leads   Date and Time of Study:2024-09-27 16:53:24      Telemetry Scan   Final Result      Telemetry Scan   Final Result      Telemetry Scan   Final Result      Telemetry Scan   Final Result      Telemetry Scan   Final Result      Telemetry Scan   Final Result      Telemetry Scan   Final Result      Telemetry Scan   Final Result      Telemetry Scan   Final Result      Telemetry Scan   Final Result      Telemetry Scan   Final Result      Telemetry Scan   Final Result      Telemetry Scan   Final Result      Telemetry Scan   Final Result      Telemetry Scan   Final Result      Telemetry Scan   Final Result      Telemetry Scan   Final Result      Telemetry Scan   Final Result      Telemetry Scan   Final Result      Telemetry Scan   Final Result      Telemetry Scan   Final Result      Telemetry Scan   Final Result      Telemetry Scan   Final Result      Telemetry Scan   Final Result      Telemetry Scan   Final Result      Telemetry Scan   Final Result      Telemetry Scan   Final Result      Telemetry Scan   Final Result      Telemetry Scan   Final Result      Telemetry Scan   Final Result      Telemetry Scan   Final Result      Telemetry Scan   Final Result            Echocardiogram:    Results for orders placed  during the hospital encounter of 09/27/24    Adult Transthoracic Echo Complete W/ Cont if Necessary Per Protocol    Interpretation Summary    Left ventricular systolic function is normal. Left ventricular ejection fraction appears to be 61 - 65%.    Left ventricular wall thickness is consistent with borderline concentric hypertrophy.    Left ventricular diastolic function was normal.    The right ventricular cavity is borderline dilated.    Estimated right ventricular systolic pressure from tricuspid regurgitation is mildly elevated (35-45 mmHg).    Mild pulmonary hypertension is present.        Stress Test:         Cardiac Catheterization:  No results found for this or any previous visit.         Other:         ASSESSMENT & PLAN:    Principal Problem:    Acute hypoxemic respiratory failure  Active Problems:    CML (chronic myelocytic leukemia)    History of pulmonary embolism    Medically noncompliant    Type 2 diabetes mellitus with diabetic polyneuropathy, with long-term current use of insulin    Sinus tachycardia    NICM (nonischemic cardiomyopathy)    Anemia due to chemotherapy    Acute on chronic HFrEF (heart failure with reduced ejection fraction)    Acute on chronic HFrEF (heart failure with reduced ejection fraction) secondary to NICM (nonischemic cardiomyopathy)  Bilateral lower extremity edema  Previous EF 26-30% in Nov. 2022.  Repeat echocardiogram September 2024 shows preserved LV function, ?normal diastolic function with mild pulmonary hypertension  Technically difficult study.  IVC is 2.4 cm  Patient has been noncompliant with medications and outpatient follow up.  High-sensitivity troponin of 17 and 22, proBNP of 4000  Previously proBNP was 12,000  Continue IV diuretics  Monitor I's and O's and replace electrolytes as needed  Continue losartan, Toprol-XL  Jardiance has been stopped  Emphasized importance of compliance with medications and follow-up  Low-salt diet discussed with the patient     Acute  respiratory failure with hypoxia  Likely multifactorial, secondary to pneumonia, CHF, and anemia   Completed antibiotics  Currently on 5 L of oxygen  Wean as tolerated  Consider pulmonology consultation     Anemia due to chemotherapy  H&H 8.6/28.5 after blood transfusion  Xarelto has been held: Can be resumed     CML (chronic myelocytic leukemia)  On chemotherapy, followed by oncology      Medically noncompliant  Compliance encouraged  / consulted   HF Nurse Navigator consulted as above     Type 2 diabetes mellitus with diabetic polyneuropathy, with long-term current use of insulin  A1c of 8.2  On Lantus and SSI     Sinus tachycardia  Due to underlying infection, anemia and hypoxia  Expected to improve with transfusion  Will need additional diuretics with blood transfusion today  Treat underlying causes  On Toprol-XL 50 mg twice daily  Heart rate is better      Rohan Jackson MD  10/03/24  06:47 EDT

## 2024-10-03 NOTE — SIGNIFICANT NOTE
10/03/24 1609   OTHER   Discipline physical therapist   Rehab Time/Intention   Session Not Performed patient/family declined, not feeling well  (Pt not feeling well at first attempt, 2nd attempt pt eating lunch, 3rd attempt pt refused due to family visiting. Follow up tomorrow.)   Recommendation   PT - Next Appointment 10/04/24

## 2024-10-03 NOTE — PLAN OF CARE
Problem: Adult Inpatient Plan of Care  Goal: Plan of Care Review  Outcome: Progressing  Flowsheets (Taken 10/3/2024 0626)  Progress: improving  Outcome Evaluation: Pt continuing to take IV lasix, currently on 5 L oxygen.  Goal: Patient-Specific Goal (Individualized)  Outcome: Progressing  Goal: Absence of Hospital-Acquired Illness or Injury  Outcome: Progressing  Intervention: Identify and Manage Fall Risk  Description: Perform standard risk assessment on admission using a validated tool or comprehensive approach appropriate to the patient; reassess fall risk frequently, with change in status or transfer to another level of care.  Communicate fall injury risk to interprofessional healthcare team.  Determine need for increased observation, equipment and environmental modification, such as low bed, signage and supportive, nonskid footwear.  Adjust safety measures to individual developmental age, stage and identified risk factors.  Reinforce the importance of safety and physical activity with patient and family.  Perform regular intentional rounding to assess need for position change, pain assessment and personal needs, including assistance with toileting.  Recent Flowsheet Documentation  Taken 10/3/2024 0600 by Anette Day LPN  Safety Promotion/Fall Prevention:   assistive device/personal items within reach   clutter free environment maintained   fall prevention program maintained   nonskid shoes/slippers when out of bed   room organization consistent   safety round/check completed  Taken 10/3/2024 0400 by Anette Day LPN  Safety Promotion/Fall Prevention:   assistive device/personal items within reach   clutter free environment maintained   nonskid shoes/slippers when out of bed   room organization consistent   safety round/check completed  Taken 10/3/2024 0200 by Anette Day LPN  Safety Promotion/Fall Prevention:   assistive device/personal items within reach   clutter free environment  maintained   nonskid shoes/slippers when out of bed   room organization consistent   safety round/check completed  Intervention: Prevent and Manage VTE (Venous Thromboembolism) Risk  Description: Assess for VTE (venous thromboembolism) risk.  Encourage and assist with early ambulation.  Initiate and maintain compression or other therapy, as indicated, based on identified risk in accordance with organizational protocol and provider order.  Encourage both active and passive leg exercises while in bed, if unable to ambulate.  Recent Flowsheet Documentation  Taken 10/3/2024 0600 by Anette Day LPN  Activity Management: up in chair  Taken 10/3/2024 0400 by Anette Day LPN  Activity Management: up in chair  Taken 10/3/2024 0200 by Anette Day LPN  Activity Management: up in chair  Goal: Optimal Comfort and Wellbeing  Outcome: Progressing  Goal: Readiness for Transition of Care  Outcome: Progressing     Problem: Diabetes Comorbidity  Goal: Blood Glucose Level Within Targeted Range  Outcome: Progressing     Problem: Heart Failure Comorbidity  Goal: Maintenance of Heart Failure Symptom Control  Outcome: Progressing     Problem: Pain Chronic (Persistent) (Comorbidity Management)  Goal: Acceptable Pain Control and Functional Ability  Outcome: Progressing     Problem: Skin Injury Risk Increased  Goal: Skin Health and Integrity  Outcome: Progressing     Problem: Fall Injury Risk  Goal: Absence of Fall and Fall-Related Injury  Outcome: Progressing  Intervention: Promote Injury-Free Environment  Description: Provide a safe, barrier-free environment that encourages independent activity.  Keep care area uncluttered and well-lighted.  Determine need for increased observation or monitoring.  Avoid use of devices that minimize mobility, such as restraints or indwelling urinary catheter.  Recent Flowsheet Documentation  Taken 10/3/2024 0600 by Anette Day LPN  Safety Promotion/Fall Prevention:   assistive  device/personal items within reach   clutter free environment maintained   fall prevention program maintained   nonskid shoes/slippers when out of bed   room organization consistent   safety round/check completed  Taken 10/3/2024 0400 by Anette Day LPN  Safety Promotion/Fall Prevention:   assistive device/personal items within reach   clutter free environment maintained   nonskid shoes/slippers when out of bed   room organization consistent   safety round/check completed  Taken 10/3/2024 0200 by Anette Day LPN  Safety Promotion/Fall Prevention:   assistive device/personal items within reach   clutter free environment maintained   nonskid shoes/slippers when out of bed   room organization consistent   safety round/check completed     Problem: Adjustment to Illness (Sepsis/Septic Shock)  Goal: Optimal Coping  Outcome: Progressing     Problem: Bleeding (Sepsis/Septic Shock)  Goal: Absence of Bleeding  Outcome: Progressing     Problem: Glycemic Control Impaired (Sepsis/Septic Shock)  Goal: Blood Glucose Level Within Desired Range  Outcome: Progressing     Problem: Infection Progression (Sepsis/Septic Shock)  Goal: Absence of Infection Signs and Symptoms  Outcome: Progressing  Intervention: Promote Recovery  Description: Encourage pulmonary hygiene, such as cough-enhancement and airway-clearance techniques, that may include use of incentive spirometry, deep breathing and cough.  Encourage early rehabilitation and physical activity to optimize functional ability and activity tolerance, as well as minimize delirium.  Promote energy conservation; minimize oxygen demand and consumption by adjusting environment, decreasing stimulation, maintaining normothermia and treating pain.  Optimize fluid balance, nutrition intake, sleep and glycemic control to maintain tissue perfusion and enhance immune response.  Recent Flowsheet Documentation  Taken 10/3/2024 0600 by Anette Day LPN  Activity Management: up in  chair  Taken 10/3/2024 0400 by Anette Day LPN  Activity Management: up in chair  Taken 10/3/2024 0200 by Anette Day LPN  Activity Management: up in chair     Problem: Nutrition Impaired (Sepsis/Septic Shock)  Goal: Optimal Nutrition Intake  Outcome: Progressing   Goal Outcome Evaluation:           Progress: improving  Outcome Evaluation: Pt continuing to take IV lasix, currently on 5 L oxygen.

## 2024-10-04 ENCOUNTER — APPOINTMENT (OUTPATIENT)
Dept: CT IMAGING | Facility: HOSPITAL | Age: 49
DRG: 193 | End: 2024-10-04
Payer: MEDICARE

## 2024-10-04 LAB
ALBUMIN SERPL-MCNC: 3.6 G/DL (ref 3.5–5.2)
ALBUMIN/GLOB SERPL: 1 G/DL
ALP SERPL-CCNC: 363 U/L (ref 39–117)
ALT SERPL W P-5'-P-CCNC: 18 U/L (ref 1–33)
ANION GAP SERPL CALCULATED.3IONS-SCNC: 10.6 MMOL/L (ref 5–15)
ANISOCYTOSIS BLD QL: ABNORMAL
ANISOCYTOSIS BLD QL: ABNORMAL
AST SERPL-CCNC: 16 U/L (ref 1–32)
BACTERIA UR QL AUTO: ABNORMAL /HPF
BASOPHILS # BLD MANUAL: 0.37 10*3/MM3 (ref 0–0.2)
BASOPHILS # BLD MANUAL: 0.85 10*3/MM3 (ref 0–0.2)
BASOPHILS NFR BLD MANUAL: 1 % (ref 0–1.5)
BASOPHILS NFR BLD MANUAL: 3 % (ref 0–1.5)
BH BB BLOOD EXPIRATION DATE: NORMAL
BH BB BLOOD TYPE BARCODE: 6200
BH BB DISPENSE STATUS: NORMAL
BH BB PRODUCT CODE: NORMAL
BH BB UNIT NUMBER: NORMAL
BILIRUB SERPL-MCNC: 0.3 MG/DL (ref 0–1.2)
BILIRUB UR QL STRIP: NEGATIVE
BLASTS NFR BLD MANUAL: 59 % (ref 0–0)
BLASTS NFR BLD MANUAL: 63 % (ref 0–0)
BUN SERPL-MCNC: 38 MG/DL (ref 6–20)
BUN/CREAT SERPL: 33.9 (ref 7–25)
CALCIUM SPEC-SCNC: 9.2 MG/DL (ref 8.6–10.5)
CHLORIDE SERPL-SCNC: 106 MMOL/L (ref 98–107)
CLARITY UR: ABNORMAL
CO2 SERPL-SCNC: 24.4 MMOL/L (ref 22–29)
COLOR UR: YELLOW
CREAT SERPL-MCNC: 1.12 MG/DL (ref 0.57–1)
CROSSMATCH INTERPRETATION: NORMAL
DEPRECATED RDW RBC AUTO: 63 FL (ref 37–54)
DEPRECATED RDW RBC AUTO: 63.3 FL (ref 37–54)
EGFRCR SERPLBLD CKD-EPI 2021: 60.8 ML/MIN/1.73
EOSINOPHIL # BLD MANUAL: 0.28 10*3/MM3 (ref 0–0.4)
EOSINOPHIL NFR BLD MANUAL: 1 % (ref 0.3–6.2)
ERYTHROCYTE [DISTWIDTH] IN BLOOD BY AUTOMATED COUNT: 18.4 % (ref 12.3–15.4)
ERYTHROCYTE [DISTWIDTH] IN BLOOD BY AUTOMATED COUNT: 18.6 % (ref 12.3–15.4)
GLOBULIN UR ELPH-MCNC: 3.6 GM/DL
GLUCOSE BLDC GLUCOMTR-MCNC: 106 MG/DL (ref 70–105)
GLUCOSE BLDC GLUCOMTR-MCNC: 166 MG/DL (ref 70–105)
GLUCOSE BLDC GLUCOMTR-MCNC: 171 MG/DL (ref 70–105)
GLUCOSE BLDC GLUCOMTR-MCNC: 214 MG/DL (ref 70–105)
GLUCOSE BLDC GLUCOMTR-MCNC: 95 MG/DL (ref 70–105)
GLUCOSE SERPL-MCNC: 106 MG/DL (ref 65–99)
GLUCOSE UR STRIP-MCNC: NEGATIVE MG/DL
HCT VFR BLD AUTO: 25.4 % (ref 34–46.6)
HCT VFR BLD AUTO: 26.2 % (ref 34–46.6)
HGB BLD-MCNC: 7.5 G/DL (ref 12–15.9)
HGB BLD-MCNC: 8 G/DL (ref 12–15.9)
HGB UR QL STRIP.AUTO: ABNORMAL
HYALINE CASTS UR QL AUTO: ABNORMAL /LPF
KETONES UR QL STRIP: ABNORMAL
LARGE PLATELETS: ABNORMAL
LARGE PLATELETS: ABNORMAL
LEUKOCYTE ESTERASE UR QL STRIP.AUTO: NEGATIVE
LYMPHOCYTES # BLD MANUAL: 2.82 10*3/MM3 (ref 0.7–3.1)
LYMPHOCYTES # BLD MANUAL: 7.07 10*3/MM3 (ref 0.7–3.1)
LYMPHOCYTES NFR BLD MANUAL: 11 % (ref 5–12)
LYMPHOCYTES NFR BLD MANUAL: 6 % (ref 5–12)
MCH RBC QN AUTO: 27.6 PG (ref 26.6–33)
MCH RBC QN AUTO: 28.5 PG (ref 26.6–33)
MCHC RBC AUTO-ENTMCNC: 29.5 G/DL (ref 31.5–35.7)
MCHC RBC AUTO-ENTMCNC: 30.5 G/DL (ref 31.5–35.7)
MCV RBC AUTO: 93.2 FL (ref 79–97)
MCV RBC AUTO: 93.4 FL (ref 79–97)
METAMYELOCYTES NFR BLD MANUAL: 2 % (ref 0–0)
MONOCYTES # BLD: 2.23 10*3/MM3 (ref 0.1–0.9)
MONOCYTES # BLD: 3.1 10*3/MM3 (ref 0.1–0.9)
MYELOCYTES NFR BLD MANUAL: 1 % (ref 0–0)
MYELOCYTES NFR BLD MANUAL: 5 % (ref 0–0)
NEUTROPHILS # BLD AUTO: 0.85 10*3/MM3 (ref 1.7–7)
NEUTROPHILS # BLD AUTO: 3.72 10*3/MM3 (ref 1.7–7)
NEUTROPHILS NFR BLD MANUAL: 1 % (ref 42.7–76)
NEUTROPHILS NFR BLD MANUAL: 8 % (ref 42.7–76)
NEUTS BAND NFR BLD MANUAL: 2 % (ref 0–5)
NEUTS BAND NFR BLD MANUAL: 2 % (ref 0–5)
NITRITE UR QL STRIP: NEGATIVE
NRBC SPEC MANUAL: 1 /100 WBC (ref 0–0.2)
OVALOCYTES BLD QL SMEAR: ABNORMAL
PH UR STRIP.AUTO: <=5 [PH] (ref 5–8)
PLATELET # BLD AUTO: 192 10*3/MM3 (ref 140–450)
PLATELET # BLD AUTO: 201 10*3/MM3 (ref 140–450)
PMV BLD AUTO: 10.1 FL (ref 6–12)
PMV BLD AUTO: 10.3 FL (ref 6–12)
POIKILOCYTOSIS BLD QL SMEAR: ABNORMAL
POLYCHROMASIA BLD QL SMEAR: ABNORMAL
POTASSIUM SERPL-SCNC: 4.6 MMOL/L (ref 3.5–5.2)
PROMYELOCYTES NFR BLD MANUAL: 6 % (ref 0–0)
PROT SERPL-MCNC: 7.2 G/DL (ref 6–8.5)
PROT UR QL STRIP: ABNORMAL
QT INTERVAL: 327 MS
QTC INTERVAL: 445 MS
RBC # BLD AUTO: 2.72 10*6/MM3 (ref 3.77–5.28)
RBC # BLD AUTO: 2.81 10*6/MM3 (ref 3.77–5.28)
RBC # UR STRIP: ABNORMAL /HPF
REF LAB TEST METHOD: ABNORMAL
SCAN SLIDE: NORMAL
SCAN SLIDE: NORMAL
SODIUM SERPL-SCNC: 141 MMOL/L (ref 136–145)
SP GR UR STRIP: 1.01 (ref 1–1.03)
SQUAMOUS #/AREA URNS HPF: ABNORMAL /HPF
STOMATOCYTES BLD QL SMEAR: ABNORMAL
UNIT  ABO: NORMAL
UNIT  RH: NORMAL
UROBILINOGEN UR QL STRIP: ABNORMAL
VARIANT LYMPHS NFR BLD MANUAL: 10 % (ref 19.6–45.3)
VARIANT LYMPHS NFR BLD MANUAL: 19 % (ref 19.6–45.3)
WBC # UR STRIP: ABNORMAL /HPF
WBC MORPH BLD: NORMAL
WBC MORPH BLD: NORMAL
WBC NRBC COR # BLD AUTO: 28.17 10*3/MM3 (ref 3.4–10.8)
WBC NRBC COR # BLD AUTO: 37.23 10*3/MM3 (ref 3.4–10.8)
YEAST URNS QL MICRO: ABNORMAL /HPF

## 2024-10-04 PROCEDURE — 80053 COMPREHEN METABOLIC PANEL: CPT | Performed by: NURSE PRACTITIONER

## 2024-10-04 PROCEDURE — 81001 URINALYSIS AUTO W/SCOPE: CPT | Performed by: STUDENT IN AN ORGANIZED HEALTH CARE EDUCATION/TRAINING PROGRAM

## 2024-10-04 PROCEDURE — 82948 REAGENT STRIP/BLOOD GLUCOSE: CPT | Performed by: STUDENT IN AN ORGANIZED HEALTH CARE EDUCATION/TRAINING PROGRAM

## 2024-10-04 PROCEDURE — 85025 COMPLETE CBC W/AUTO DIFF WBC: CPT | Performed by: NURSE PRACTITIONER

## 2024-10-04 PROCEDURE — 82948 REAGENT STRIP/BLOOD GLUCOSE: CPT

## 2024-10-04 PROCEDURE — 63710000001 INSULIN GLARGINE PER 5 UNITS: Performed by: STUDENT IN AN ORGANIZED HEALTH CARE EDUCATION/TRAINING PROGRAM

## 2024-10-04 PROCEDURE — 99222 1ST HOSP IP/OBS MODERATE 55: CPT | Performed by: INTERNAL MEDICINE

## 2024-10-04 PROCEDURE — 87086 URINE CULTURE/COLONY COUNT: CPT | Performed by: STUDENT IN AN ORGANIZED HEALTH CARE EDUCATION/TRAINING PROGRAM

## 2024-10-04 PROCEDURE — 25010000002 FUROSEMIDE PER 20 MG: Performed by: INTERNAL MEDICINE

## 2024-10-04 PROCEDURE — 85007 BL SMEAR W/DIFF WBC COUNT: CPT | Performed by: NURSE PRACTITIONER

## 2024-10-04 PROCEDURE — 71250 CT THORAX DX C-: CPT

## 2024-10-04 PROCEDURE — 63710000001 INSULIN LISPRO (HUMAN) PER 5 UNITS: Performed by: STUDENT IN AN ORGANIZED HEALTH CARE EDUCATION/TRAINING PROGRAM

## 2024-10-04 PROCEDURE — 25010000002 ONDANSETRON PER 1 MG: Performed by: NURSE PRACTITIONER

## 2024-10-04 PROCEDURE — 87040 BLOOD CULTURE FOR BACTERIA: CPT | Performed by: STUDENT IN AN ORGANIZED HEALTH CARE EDUCATION/TRAINING PROGRAM

## 2024-10-04 PROCEDURE — 99232 SBSQ HOSP IP/OBS MODERATE 35: CPT | Performed by: INTERNAL MEDICINE

## 2024-10-04 RX ADMIN — GABAPENTIN 800 MG: 400 CAPSULE ORAL at 20:48

## 2024-10-04 RX ADMIN — ACETAMINOPHEN 650 MG: 325 TABLET, FILM COATED ORAL at 13:55

## 2024-10-04 RX ADMIN — DICLOFENAC SODIUM 4 G: 10 GEL TOPICAL at 20:49

## 2024-10-04 RX ADMIN — GABAPENTIN 800 MG: 400 CAPSULE ORAL at 13:55

## 2024-10-04 RX ADMIN — AMOXICILLIN AND CLAVULANATE POTASSIUM 1 TABLET: 875; 125 TABLET, FILM COATED ORAL at 20:48

## 2024-10-04 RX ADMIN — TIZANIDINE 4 MG: 4 TABLET ORAL at 09:10

## 2024-10-04 RX ADMIN — METOPROLOL SUCCINATE 50 MG: 50 TABLET, EXTENDED RELEASE ORAL at 09:10

## 2024-10-04 RX ADMIN — METOPROLOL SUCCINATE 50 MG: 50 TABLET, EXTENDED RELEASE ORAL at 20:48

## 2024-10-04 RX ADMIN — MIRTAZAPINE 15 MG: 15 TABLET, FILM COATED ORAL at 20:48

## 2024-10-04 RX ADMIN — INSULIN LISPRO 2 UNITS: 100 INJECTION, SOLUTION INTRAVENOUS; SUBCUTANEOUS at 17:06

## 2024-10-04 RX ADMIN — FUROSEMIDE 40 MG: 10 INJECTION, SOLUTION INTRAMUSCULAR; INTRAVENOUS at 17:06

## 2024-10-04 RX ADMIN — ALPRAZOLAM 0.5 MG: 0.5 TABLET ORAL at 20:49

## 2024-10-04 RX ADMIN — Medication 10 ML: at 20:50

## 2024-10-04 RX ADMIN — FUROSEMIDE 40 MG: 10 INJECTION, SOLUTION INTRAMUSCULAR; INTRAVENOUS at 05:08

## 2024-10-04 RX ADMIN — INSULIN LISPRO 3 UNITS: 100 INJECTION, SOLUTION INTRAVENOUS; SUBCUTANEOUS at 09:11

## 2024-10-04 RX ADMIN — ONDANSETRON 4 MG: 2 INJECTION, SOLUTION INTRAMUSCULAR; INTRAVENOUS at 13:56

## 2024-10-04 RX ADMIN — AMOXICILLIN AND CLAVULANATE POTASSIUM 1 TABLET: 875; 125 TABLET, FILM COATED ORAL at 12:42

## 2024-10-04 RX ADMIN — INSULIN GLARGINE 28 UNITS: 100 INJECTION, SOLUTION SUBCUTANEOUS at 20:48

## 2024-10-04 RX ADMIN — LOSARTAN POTASSIUM 12.5 MG: 25 TABLET, FILM COATED ORAL at 09:10

## 2024-10-04 RX ADMIN — INSULIN LISPRO 7 UNITS: 100 INJECTION, SOLUTION INTRAVENOUS; SUBCUTANEOUS at 17:06

## 2024-10-04 RX ADMIN — GABAPENTIN 800 MG: 400 CAPSULE ORAL at 05:08

## 2024-10-04 RX ADMIN — INSULIN LISPRO 7 UNITS: 100 INJECTION, SOLUTION INTRAVENOUS; SUBCUTANEOUS at 09:10

## 2024-10-04 RX ADMIN — Medication 10 ML: at 09:11

## 2024-10-04 NOTE — PLAN OF CARE
Goal Outcome Evaluation:  Plan of Care Reviewed With: patient        Progress: improving  Outcome Evaluation: Slept at intervals in recliner. Remains on O2 5LHF. No c/o discomfort. Will continue to monitor.

## 2024-10-04 NOTE — PROGRESS NOTES
[facility] MEDICINE SERVICE  DAILY PROGRESS NOTE    NAME: Nieves Mc  : 1975  MRN: [id number]      LOS: 7 days     PROVIDER OF SERVICE: Gilda Hernandez MD    Chief Complaint: Acute hypoxemic respiratory failure    Subjective:     Interval History:  History taken from: patient      History of Present Illness: Nieves Mc is a 48 y.o. female with a PMH of CML on ponatinib, chronic anemia, leukopenia on bilateral subdural hematoma, systolic CHF, Diabetes type 1, PE on xarelto, anxiety who presented to [facility] on 2024 with shhypoxia. Patient was evaluated earlier at cancer center for a blood transfusion, was then found to be hypoxic SPO2 76 %. Patient was subsequently sent to ED for evaluation. Denies chest pain, fever chills, hemoptysis. She also denies SOB. Reports bilateral LE swelling.  Evaluation in ED patient saturating 86 %, requiring 3 L NC to maintain SPO2 > 90 %. Tachycardic in mid 110s, afebrile, slightly hypertensive. CXR done showed patchy right greater than left airspace opacities. CBC labs notable for neutropenia, Hb 7.7. Chemistry labs T bili 1.8, creat 1.29, BUN 34. HS trop marginally elevated 17, proBNP 4.257. The decision to admit was made.      24 seen in bd NAD, C/O dyspnea, on 5lts oxygen  24 still c/o dyspnea on 5 lts, bp stable, will likely need Oxygen on dc. GIRMA RN  24 patient seen and examined in bed no acute distress, heart rate 133, dyspnea on 5 L, hemoglobin 7, will transfuse 1 units of packed RBC, discussed with RN.  10/1: received 1PRBC  10/2: Hb stable post transfusion, denies any new complaints   10/3: WBC is trending up, septic w/u ordered, pulmonary and Oncology to see the patient today    Review of Systems  Constitutional:  Positive for fatigue. Negative for fever.   HENT:  Positive for congestion.    Respiratory:  Positive for cough and shortness of breath. Negative for chest tightness.    Cardiovascular:  Negative for  chest pain.   Gastrointestinal:  Negative for abdominal pain.   Genitourinary:  Negative for dysuria.   Psychiatric/Behavioral:  Negative for confusion.    Objective:     Vital Signs  Temp:  [98.1 °F (36.7 °C)-98.8 °F (37.1 °C)] 98.7 °F (37.1 °C)  Heart Rate:  [] 112  Resp:  [16-21] 20  BP: (106-151)/(66-95) 115/71  Flow (L/min):  [5] 5   Body mass index is 25.06 kg/m².    Physical Exam  General: Alert and oriented, no acute distress. Pale looking  HENT: Normocephalic, moist oral mucosa, no scleral icterus.  Neck: Supple, nontender, no carotid bruits, no JVD, no LAD.  Lungs: bilateral rales   Heart: RRR, no murmur, gallop or edema.  Abdomen: Soft, nontender, nondistended, + bowel sounds.  Musculoskeletal: pedal edema   Neuro: alert and awake, moving all 4 extremities   Skin: Skin is warm, dry and pink, no rashes or lesions.  Psychiatric: Cooperative, appropriate mood and affect.         Diagnostic Data    Results from last 7 days   Lab Units 10/04/24  0330   WBC 10*3/mm3 28.17*   HEMOGLOBIN g/dL 7.5*   HEMATOCRIT % 25.4*   PLATELETS 10*3/mm3 201   GLUCOSE mg/dL 106*   CREATININE mg/dL 1.12*   BUN mg/dL 38*   SODIUM mmol/L 141   POTASSIUM mmol/L 4.6   AST (SGOT) U/L 16   ALT (SGPT) U/L 18   ALK PHOS U/L 363*   BILIRUBIN mg/dL 0.3   ANION GAP mmol/L 10.6       No radiology results for the last day    Scheduled Meds:acetaminophen, 650 mg, Oral, Once   Or  acetaminophen, 650 mg, Oral, Once   Or  acetaminophen, 650 mg, Rectal, Once  amoxicillin-clavulanate, 1 tablet, Oral, Q12H  furosemide, 40 mg, Intravenous, BID  gabapentin, 800 mg, Oral, Q8H  insulin glargine, 28 Units, Subcutaneous, Nightly  insulin lispro, 2-7 Units, Subcutaneous, 4x Daily AC & at Bedtime  insulin lispro, 7 Units, Subcutaneous, TID With Meals  losartan, 12.5 mg, Oral, Q24H  metoprolol succinate XL, 50 mg, Oral, Q12H  mirtazapine, 15 mg, Oral, Nightly  [Held by provider] rivaroxaban, 10 mg, Oral, Daily  sodium chloride, 10 mL, Intravenous,  Q12H  tiZANidine, 4 mg, Oral, Daily      Continuous Infusions:pain,       PRN Meds:.  acetaminophen **OR** acetaminophen **OR** acetaminophen    ALPRAZolam    senna-docusate sodium **AND** polyethylene glycol **AND** bisacodyl **AND** bisacodyl    Calcium Replacement - Follow Nurse / BPA Driven Protocol    dextrose    dextrose    Diclofenac Sodium    glucagon (human recombinant)    hydrALAZINE    Magnesium Standard Dose Replacement - Follow Nurse / BPA Driven Protocol    ondansetron    Phosphorus Replacement - Follow Nurse / BPA Driven Protocol    Potassium Replacement - Follow Nurse / BPA Driven Protocol    [COMPLETED] Insert Peripheral IV **AND** sodium chloride    sodium chloride    sodium chloride     I reviewed the patient's new clinical results.    Assessment/Plan:     Active and Resolved Problems  Active Hospital Problems    Diagnosis  POA    **Acute hypoxemic respiratory failure [J96.01]  Yes    Acute on chronic HFrEF (heart failure with reduced ejection fraction) [I50.23]  Yes    Sinus tachycardia [R00.0]  Yes    Anemia due to chemotherapy [D64.81, T45.1X5A]  Yes    NICM (nonischemic cardiomyopathy) [I42.8]  Yes    Type 2 diabetes mellitus with diabetic polyneuropathy, with long-term current use of insulin [E11.42, Z79.4]  Not Applicable    Medically noncompliant [Z91.199]  Not Applicable    History of pulmonary embolism [Z86.711]  Yes    CML (chronic myelocytic leukemia) [C92.10]  Yes      Resolved Hospital Problems   No resolved problems to display.     Acute hypoxemic respiratory failure due to Community Acquired Pneumonia and acute on chronic diastolic CHF  Patient requiring 5 L NC oxygen supplementation to maintain SPO2 > 90 %  CXR showed bilateral airspace opacities R>L  Continue cefepime   Follow blood and sputum culture, atypical workup- NGTD  Repeat Xray to see interval change - no acute changes  Pulmonary recommendations noted- Augmentin and IV lasix     Acute on chronic HFrEF (heart failure with  reduced ejection fraction) secondary to NICM (nonischemic cardiomyopathy)  Bilateral lower extremity edema  Cardiology consulted  Previous EF 26-30% in Nov. 2022,  Repeat echocardiogram in March 2024 showed improvement in LV function with EF of 62%  Patient has been noncompliant with medications and outpatient follow up  High-sensitivity troponin of 17 and 22, proBNP of 4000  Start Lasix  Monitor I's and O's and replace electrolytes as needed  Creatinine 1.13  continue losartan, Toprol-XL and Jardiance  Pro BNP 4257>2153  -lasix 20 mg IV bid  TTE on 3/21/24 showed LVEF > 62 %, was 44 % on 3/10/24  Wean oxygen as tolerated     Type 1 DM  Continue insulin lantus 28 units HS  Continue lispro 7 units before meals plus SS     CML (chronic myelocytic leukemia)  On chemotherapy, followed by oncology   Chronic anemia  Now with leucocytosis   Completed blood transfusion prior to coming to ED  Continue Ponatinib  Transfuse blood products as needed  Oncology consulted        History of anxiety  Continue Remeron   Continue Xanax    Sinus tachycardia-Due to underlying infection, anemia and hypoxia  Per cardiology Treat underlying causes  Increased Toprol-XL to twice daily    Medically noncompliant  Compliance encouraged  / consulted   HF Nurse Navigator consulted as above       VTE Prophylaxis:  Pharmacologic VTE prophylaxis orders are present.      Disposition Planning:   Barriers to Discharge:dyspnea  Anticipated Date of Discharge: TBD based on clinical course  Place of Discharge: home      Time: 40 minutes     Code Status and Medical Interventions: CPR (Attempt to Resuscitate); Full Support   Ordered at: 09/28/24 0443     Code Status (Patient has no pulse and is not breathing):    CPR (Attempt to Resuscitate)     Medical Interventions (Patient has pulse or is breathing):    Full Support       Signature: Electronically signed by Gilda Hernandez MD, 10/04/24, 11:40 EDT.  Natalie Amor  Hospitalist Team
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
## 2024-10-04 NOTE — CASE MANAGEMENT/SOCIAL WORK
Continued Stay Note  ZOHAIB Amor     Patient Name: Nieves Mc  MRN: [id number]  Today's Date: 10/4/2024    Admit Date: 9/27/2024    Plan: DC PLAN: Routine home. Referral to [facility]( Will need order) Watch for 6 min walk.       Discharge Plan       Row Name 10/04/24 1418       Plan    Plan DC PLAN: Routine home. Referral to [facility]( Will need order) Watch for 6 min walk.        Patient/Family in Agreement with Plan yes    Plan Comments DC BARRIERS: 02 5L, IV Lasix, IV antibiotics. Possible discharge tomorrow.                      Expected Discharge Date and Time       Expected Discharge Date Expected Discharge Time    Oct 4, 2024           Sharon Lovett RN   Case Management  [phone]

## 2024-10-04 NOTE — SIGNIFICANT NOTE
10/04/24 1517   OTHER   Discipline physical therapist   Rehab Time/Intention   Session Not Performed patient unavailable for treatment  (Pt getting blood drawn and going to CT at time of attempt.)   Recommendation   PT - Next Appointment 10/07/24

## 2024-10-04 NOTE — CONSULTS
Hematology/Oncology Inpatient Consultation    Patient name: Nieves Mc  : 1975  MRN: [id number]  Referring Provider: Dr. Hernandez  Reason for Consultation: CML, anemia    Hematology/Oncology history (from record):  Patient is a 48 y.o. female with PMH significant for CML on treatment with Imatinib.  Patient stated that she typically feels poorly with Imatinib with frequent nausea and vomiting.  Also complained of weakness and fatigue.  Patient presented to ED on 10/22/18 with complaints of an elevated blood sugar around 400.  Stated she felt poorly and has had a productive cough with yellow sputum.  Complained of nausea and stated she was unable to keep any food down anytime she ate.  The patient reported subjective fever without chills.  She stated she had been coughing so hard that she was vomiting.  She denied hematemesis.  Denied diarrhea, constipation or blood in her stool.       Patient’s chest x-ray showed no evidence of acute pulmonary embolus.  The patient has ground-glass opacities throughout the lungs.  There is some air trapping noted which would appear to be   infectious.  Patient was started on antibiotics.      Hem/Onc was consulted on 10/23/18 for co-management of patient with CML.  Patient was seen by Dr. Lerma on 10/12 on previous admission for patient reported CML on Imatinib (Gleevec).  Patient reports she is under the care of Dr. Roach at [facility] in [place].  Patient was seen by Dr. Lerma in May 2018 when patient presented with hematuria, which was attributed to her Imatinib.  Dr. Lerma followed during hospitalization but then patient returned to Dr. Roach for her usual follow up.  She was again seen on 10/12.  Patient is stating she will switch to Dr. Lerma.    Review of Dr. Roach’s note:  On 18 patient had BCR-abl which was 61.326.  Patient had been on Imatinib 400 mg daily.  She had presented in 2018 with WBC of 24, hemoglobin 13.1, platelet  count of 1,067,000.  Patient apparently had follow up BCR-abl in August 2018, results are not available for review.  Her bone marrow aspiration and biopsy was also performed at that time is currently not available for review.    11/6/18 - .  Uric acid 6.5.  BCR-abl by RT-PCR was measured at 3.36%.    Due to thrombocytosis, patient was placed on Hydroxyurea 500 mg twice a day on 12/11/18.  Platelet count juana to 900,000.  Patient was noncompliant with CBCs that were recommended.    Patient was asked to return to the office after not being able to reach her.   12/27/18 - Bone marrow aspiration and biopsy was consistent with chronic myeloid leukemia.  BCR-abl positive, chronic phase.  ABL kinase mutational analysis is currently pending on the bone marrow.    1/3/19 - Repeat CBC, WBC 17, hemoglobin 11.9, platelet count 1,072,000.  Hydroxyurea was increased to 1 gm twice a day with follow up CBC.    1/6/19 - Follow up CBC showed progressive increase in platelet to 1.1 million.  Patient was offered admission to the hospital, which she declined.  Hydroxyurea was increased to 1 gm three   times a day as of 1/6/19.   1/7/19 - EKG shows sinus tachycardia.   milliseconds.    ABL kinase on peripheral blood was ordered on 1/11/19.  Based on sequence analysis, there was no mutation detected.    2/12/19 - Patient was initiated on Tasigna 300 mg twice a day.  2/13/19 - Urinalysis was negative for hematuria.   2/22/19 -  milliseconds.  3/13/19 - EKG with QTC is 413 milliseconds.  Nonspecific changes noted.    4/18/19 - EKG showed QTC prolonged to 453 milliseconds.  This is changed from prior.  4/18/19 - Free T4 normal at 0.91.  Magnesium was 1.7.  BUN is 6.  Creatinine 0.7.  Bilirubin 2.2.  Phosphorous normal 3.7.  TSH 0.43, normal.  Free T3 was normal at 3.47.  Ionized calcium   normal at 1.23.  BCR-abl was 0.522%.    5/7/19 - EKG showed QTC of 441 milliseconds.  QT was 366.      Patient has been lost to  follow-up since 2019 for CML.  Patient states that she lost her insurance.  She also does not have any primary care physician at this time.  She is diabetic on insulin.  Patient was recently admitted to the hospital for pneumonia due to COVID-19 infection.  At that time patient made a decision to become compliant with treatment.  She is currently on to Cigna 300 mg p.o. twice a day.  She is also on hydroxyurea 1 g twice a day.  Overall she feels better, she has more energy.  3/1/2022 white count is 53.92, hemoglobin 11.7 and platelets are 472     6/1/2022: Patient has not been seen since March 2022.  Also verified that she has not been taking to Tasigna since February 2022.  She comes in today with cough for 1 and half months, shortness of breath, denies fevers.  6/30/2022 patient had 2D echocardiogram which showed an EF of 41 to 45%.  Left ventricular cavity is mildly dilated.  She was diagnosed with nonischemic cardiomyopathy     11/18/2022: Patient was transferred from [facility] to [facility] due to cardiac failure.  She was then seen by [facility] hematology department.  Patient underwent tumor aspiration and biopsy at that time did not show chronic myeloid leukemia in accelerated phase markedly hypercellular marrow with megakaryocytic and myeloid hyperplasia with atypia and increased basophils blasts are not increased less than 1% moderate reticulin fibrosis.  According to the patient hydroxyurea was discontinued she was prescribed Gleevec 600 mg daily but she has only been taking 400 mg as she cannot find other milligram tablets.  She is already been pump inserted into her pelvic area.  She continues to experience nausea which resulted in her not taking the baclofen she should.  1/12/2023: WBC 17.64, hemoglobin 10.2, MCV 88.8, platelets 458,000.  On Gleevec 600 mg daily and hydroxyurea 500 mg twice daily.  1/19/2023: WBC 10.67, hemoglobin 10.0, MCV 86.4, platelets 370,000.  Patient  on Gleevec 600 mg daily and hydroxyurea 500 mg once a day.  Patient instructed at the visit to discontinue her hydroxyurea.  1/26/2023: WBC 17.75, hemoglobin 10.4, MCV 87.2, platelets 425,000.  On Gleevec 600 mg daily.  3/10/2023: 2D echocardiogram showing EF of 44% EKG showing sinus tachycardia QTc of 491  10/5/2023: Patient was initiated on Dasatinib 100 mg p.o. daily  11/5/2023-11/8/2023: Patient hospitalized at [facility] due to rash that was thought to be secondary to Sprycel and elevated glucose.  Sprycel was held and patient was treated with a 5-day course of prednisone.  11/27/2023: WBC 3.10, hemoglobin 8.2, MCV 90.3, platelets 214,000, ANC 1100.  9/26/2024 patient denies any new issues she has been noncompliant with ponatinib for the past 3 to 4 weeks    Chief complaint: Hypoxia    History of present illness:    Nieves Mc is a 48 y.o. female who presented to [facility] on 9/27/2024 with complaints of shortness of breath.  She was found to hypoxic with SpO2 76% while undergoing evaluation for blood transfusion.  She was subsequently sent to the ED for further evaluation.  She does report bilateral leg swelling.  In the ED she was saturating 86% and requiring 3 L nasal cannula to maintain PaO2 over 90%.  She was mildly tachycardic, hypertensive and afebrile.  Chest x-ray completed showed patchy right greater than left airspace opacities.  CBC was notable for neutropenia, anemia.  She was started on IV antibiotics.  Patient is now needing 5 L oxygen.  Cardiology and pulmonology have been consulted.  It appears she has been noncompliant with her cardiac medications and her cardiology outpatient follow-up.  Interval chest x-ray on 10/1/2024 showed stable patchy multifocal airspace infiltrates interstitial changes throughout the lungs bilaterally.  She had duplex ultrasound bilateral lower extremities due to swelling and this was normal.      10/04/24  Hematology/Oncology was consulted.  She is established  with Dr. Lerma for CML.  She is currently on treatment with ponatinib, but most recent office visit with Dr. Lerma she had stated she has been noncompliant over the last 3 to 4 weeks.  He was encouraged at that visit to restart her treatment.    He/She  has a past medical history of Acute respiratory failure with hypoxia (07/28/2022), Adverse effect of chemotherapy (11/08/2023), Bilateral subdural hematomas (05/18/2023), Bone pain, Chronic constipation (07/07/2023), CML (chronic myelocytic leukemia) (04/01/2018), COVID-19 virus infection (01/12/2022), Diabetes mellitus, Diabetic gastroparesis (07/07/2023), Extremity pain, Fall during current hospitalization (06/25/2023), Leg pain, Medically noncompliant (03/11/2021), Migraine, Petechial rash (11/08/2023), Pubic ramus fracture, left, closed, initial encounter (06/25/2023), Pulmonary embolism, Traumatic subdural hemorrhage without loss of consciousness (05/17/2023), 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    PCP: Pankaj Stover MD    History:  Past Medical History:   Diagnosis Date    Acute respiratory failure with hypoxia 07/28/2022    Adverse effect of chemotherapy 11/08/2023    Bilateral subdural hematomas 05/18/2023    Bone pain     Chronic constipation 07/07/2023    CML (chronic myelocytic leukemia) 04/01/2018    COVID-19 virus infection 01/12/2022    Diabetes mellitus     Diabetic gastroparesis 07/07/2023    Extremity pain     Carlin. legs pain    Fall during current hospitalization 06/25/2023    Leg pain     left leg greater    Medically noncompliant 03/11/2021    Migraine     Petechial rash 11/08/2023    Pubic ramus fracture, left, closed, initial encounter 06/25/2023    Pulmonary embolism     Traumatic subdural hemorrhage without loss of consciousness 05/17/2023    Tumor lysis syndrome 07/05/2022    UTI (urinary tract infection) 07/06/2023    Vision loss     doing surgery   ,   Past Surgical History:    Procedure Laterality Date    BONE MARROW BIOPSY      BREAST SURGERY      BRONCHOSCOPY N/A 2022    Procedure: BRONCHOSCOPY bilateral lung washing;  Surgeon: Charlene Camara MD;  Location: [facility] ENDOSCOPY;  Service: Pulmonary;  Laterality: N/A;  post: rule out infection vs transfusion lung injury     SECTION      CHOLECYSTECTOMY      COLONOSCOPY N/A 2023    Procedure: COLONOSCOPY;  Surgeon: Freddy Villeda MD;  Location: [facility] ENDOSCOPY;  Service: Gastroenterology;  Laterality: N/A;  poor prep    ENDOSCOPY N/A 2023    Procedure: ESOPHAGOGASTRODUODENOSCOPY with biopsy x2 areas;  Surgeon: Freddy Villeda MD;  Location: [facility] ENDOSCOPY;  Service: Gastroenterology;  Laterality: N/A;  food in stomach;abnormal duodenal mucosa    EYE SURGERY      laser surgery due  to hemmorage--- 2021-- another surgery  lt eye11/15/21    RETINAL DETACHMENT SURGERY      SPINE SURGERY      Lombardi spinal block    TUBAL ABDOMINAL LIGATION     ,   Family History   Problem Relation Age of Onset    Diabetes Mother     Diabetes Maternal Grandmother     Heart attack Maternal Grandmother     Stroke Maternal Grandmother    ,   Social History     Tobacco Use    Smoking status: Never    Smokeless tobacco: Never   Vaping Use    Vaping status: Never Used   Substance Use Topics    Alcohol use: No    Drug use: No   ,   Medications Prior to Admission   Medication Sig Dispense Refill Last Dose    acetaminophen (TYLENOL) 325 MG tablet Take 2 tablets by mouth Every 4 (Four) Hours As Needed for Moderate Pain. Indications: Fever, Pain 60 tablet 3 2024    ALPRAZolam (Xanax) 0.5 MG tablet Take 1 tablet by mouth At Night As Needed for Anxiety. Indications: Feeling Anxious 30 tablet 0 2024    Diclofenac Sodium (VOLTAREN) 1 % gel gel Apply 4 g topically to the appropriate area as directed 4 (Four) Times a Day As Needed (hip pain). 100 g 2     doxycycline (VIBRAMYICN) 100 MG tablet Take 1 tablet by mouth 2 (Two) Times a Day for  10 days. 20 tablet 0 9/26/2024    furosemide (LASIX) 40 MG tablet Take 1 tablet by mouth Daily. Indications: Edema 30 tablet 2 9/26/2024    gabapentin (Neurontin) 800 MG tablet Take 1 tablet by mouth 3 (Three) Times a Day. Ordered through [facility]  Indications: Diabetes with Nerve Disease 90 tablet 2 9/27/2024    Insulin Glargine (LANTUS SOLOSTAR) 100 UNIT/ML injection pen Inject 28 Units under the skin into the appropriate area as directed Every Night. Indications: Type 2 Diabetes 30 mL 0 9/26/2024    Insulin Lispro, 1 Unit Dial, (HumaLOG KwikPen) 100 UNIT/ML solution pen-injector Inject 7 Units under the skin into the appropriate area as directed 3 (Three) Times a Day Before Meals. Dx code: E11.65 15 mL 2 9/26/2024    levoFLOXacin (Levaquin) 500 MG tablet Take 1 tablet by mouth Daily. 7 tablet 0 9/26/2024    mirtazapine (REMERON) 15 MG tablet Take 1 tablet by mouth Every Night.   9/26/2024    pain patient supplied pump by Intrathecal route Continuous.   9/27/2024    PONATinib HCl (Iclusig) 10 MG tablet Take 10 mg by mouth Every Other Day. Take with or without food.  Swallow whole, do not crush or chew. 30 tablet 3 9/26/2024    rivaroxaban (Xarelto) 10 MG tablet Take 1 tablet by mouth Daily. 30 tablet 6 9/26/2024    tiZANidine (ZANAFLEX) 4 MG tablet Take 1 tablet by mouth Daily. Indications: Musculoskeletal Pain 90 tablet 1 9/26/2024   , Scheduled Meds:  acetaminophen, 650 mg, Oral, Once   Or  acetaminophen, 650 mg, Oral, Once   Or  acetaminophen, 650 mg, Rectal, Once  amoxicillin-clavulanate, 1 tablet, Oral, Q12H  furosemide, 40 mg, Intravenous, BID  gabapentin, 800 mg, Oral, Q8H  insulin glargine, 28 Units, Subcutaneous, Nightly  insulin lispro, 2-7 Units, Subcutaneous, 4x Daily AC & at Bedtime  insulin lispro, 7 Units, Subcutaneous, TID With Meals  losartan, 12.5 mg, Oral, Q24H  metoprolol succinate XL, 50 mg, Oral, Q12H  mirtazapine, 15 mg, Oral, Nightly  [Held by provider] rivaroxaban, 10 mg, Oral,  "Daily  sodium chloride, 10 mL, Intravenous, Q12H  tiZANidine, 4 mg, Oral, Daily    , Continuous Infusions:  pain,     , PRN Meds:    acetaminophen **OR** acetaminophen **OR** acetaminophen    ALPRAZolam    senna-docusate sodium **AND** polyethylene glycol **AND** bisacodyl **AND** bisacodyl    Calcium Replacement - Follow Nurse / BPA Driven Protocol    dextrose    dextrose    Diclofenac Sodium    glucagon (human recombinant)    hydrALAZINE    Magnesium Standard Dose Replacement - Follow Nurse / BPA Driven Protocol    ondansetron    Phosphorus Replacement - Follow Nurse / BPA Driven Protocol    Potassium Replacement - Follow Nurse / BPA Driven Protocol    [COMPLETED] Insert Peripheral IV **AND** sodium chloride    sodium chloride    sodium chloride   Allergies:  Contrast dye (echo or unknown ct/mr)    Subjective     ROS:  Review of Systems   Constitutional:  Positive for fatigue.   Respiratory:  Positive for cough and shortness of breath.    Neurological:  Positive for weakness.        Objective   Vital Signs:   /71 (BP Location: Right arm, Patient Position: Sitting)   Pulse 112   Temp 98.7 °F (37.1 °C) (Oral)   Resp 20   Ht 162.6 cm (64\")   Wt 66.2 kg (146 lb)   LMP 05/25/2022 (Approximate)   SpO2 94%   BMI 25.06 kg/m²     Physical Exam: (performed by MD)  Physical Exam  Vitals and nursing note reviewed.   Constitutional:       General: She is not in acute distress.     Appearance: She is ill-appearing. She is not diaphoretic.   HENT:      Head: Normocephalic and atraumatic.   Eyes:      General: No scleral icterus.        Right eye: No discharge.         Left eye: No discharge.      Conjunctiva/sclera: Conjunctivae normal.   Neck:      Thyroid: No thyromegaly.   Cardiovascular:      Rate and Rhythm: Normal rate and regular rhythm.      Heart sounds: Normal heart sounds.      No friction rub. No gallop.   Pulmonary:      Effort: Pulmonary effort is normal. No respiratory distress.      Breath sounds: " No stridor. Rhonchi present. No wheezing.   Abdominal:      General: Bowel sounds are normal.      Palpations: Abdomen is soft. There is no mass.      Tenderness: There is no abdominal tenderness. There is no guarding or rebound.   Musculoskeletal:         General: No tenderness. Normal range of motion.      Cervical back: Normal range of motion and neck supple.      Right lower leg: Edema present.      Left lower leg: Edema present.   Lymphadenopathy:      Cervical: No cervical adenopathy.   Skin:     General: Skin is warm.      Findings: No erythema or rash.   Neurological:      Mental Status: She is alert and oriented to person, place, and time.      Motor: No abnormal muscle tone.   Psychiatric:         Behavior: Behavior normal.         Results Review:  Lab Results (last 48 hours)       Procedure Component Value Units Date/Time    Urinalysis, Microscopic Only - Urine, Clean Catch [[id number]]  (Abnormal) Collected: 10/04/24 0934    Specimen: Urine, Clean Catch Updated: 10/04/24 1017     RBC, UA 0-2 /HPF      WBC, UA 6-10 /HPF      Bacteria, UA None Seen /HPF      Squamous Epithelial Cells, UA 3-6 /HPF      Yeast, UA Small/1+ Budding Yeast /HPF      Hyaline Casts, UA 3-6 /LPF      Methodology Manual Light Microscopy    Urine Culture - Urine, Urine, Clean Catch [[id number]] Collected: 10/04/24 0934    Specimen: Urine, Clean Catch Updated: 10/04/24 1017    Urinalysis With Culture If Indicated - Urine, Clean Catch [[id number]]  (Abnormal) Collected: 10/04/24 0934    Specimen: Urine, Clean Catch Updated: 10/04/24 1004     Color, UA Yellow     Appearance, UA Hazy     Comment: Result checked          pH, UA <=5.0     Specific Gravity, UA 1.014     Glucose, UA Negative     Ketones, UA Trace     Bilirubin, UA Negative     Blood, UA Trace     Protein,  mg/dL (2+)     Leuk Esterase, UA Negative     Nitrite, UA Negative     Urobilinogen, UA 0.2 E.U./dL    Narrative:      In absence of clinical symptoms, the presence of  pyuria, bacteria, and/or nitrites on the urinalysis result does not correlate with infection.    POC Glucose 4x Daily Before Meals & at Bedtime [[id number]]  (Abnormal) Collected: 10/04/24 0747    Specimen: Blood Updated: 10/04/24 0749     Glucose 214 mg/dL      Comment: Serial Number: [id number]:  [id number]       Manual Differential [[id number]]  (Abnormal) Collected: 10/04/24 0330    Specimen: Blood from Arm, Right Updated: 10/04/24 0544     Neutrophil % 1.0 %      Lymphocyte % 10.0 %      Monocyte % 11.0 %      Eosinophil % 1.0 %      Basophil % 3.0 %      Bands %  2.0 %      Metamyelocyte % 2.0 %      Myelocyte % 1.0 %      Promyelocyte % 6.0 %      Blasts % 63.0 %      Neutrophils Absolute 0.85 10*3/mm3      Lymphocytes Absolute 2.82 10*3/mm3      Monocytes Absolute 3.10 10*3/mm3      Eosinophils Absolute 0.28 10*3/mm3      Basophils Absolute 0.85 10*3/mm3      nRBC 1.0 /100 WBC      Anisocytosis Slight/1+     WBC Morphology Normal     Large Platelets Slight/1+    Narrative:      Reviewed by Pathologist within the past 30 days on [id number].    CBC & Differential [[id number]]  (Abnormal) Collected: 10/04/24 0330    Specimen: Blood from Arm, Right Updated: 10/04/24 0544    Narrative:      The following orders were created for panel order CBC & Differential.  Procedure                               Abnormality         Status                     ---------                               -----------         ------                     CBC Auto Differential[[id number]]        Abnormal            Final result               Scan Slide[[id number]]                                       Final result                 Please view results for these tests on the individual orders.    Scan Slide [[id number]] Collected: 10/04/24 0330    Specimen: Blood from Arm, Right Updated: 10/04/24 0544     Scan Slide --     Comment: See Manual Differential Results       CBC Auto Differential [[id number]]  (Abnormal) Collected: 10/04/24 0330     Specimen: Blood from Arm, Right Updated: 10/04/24 0544     WBC 28.17 10*3/mm3      RBC 2.72 10*6/mm3      Hemoglobin 7.5 g/dL      Hematocrit 25.4 %      MCV 93.4 fL      MCH 27.6 pg      MCHC 29.5 g/dL      RDW 18.6 %      RDW-SD 63.3 fl      MPV 10.1 fL      Platelets 201 10*3/mm3     Narrative:      The previously reported component NRBC is no longer being reported. Previous result was 0.8 /100 WBC (Reference Range: 0.0-0.2 /100 WBC) on 10/4/2024 at 0428 EDT.    Comprehensive Metabolic Panel [[id number]]  (Abnormal) Collected: 10/04/24 0330    Specimen: Blood from Arm, Right Updated: 10/04/24 0429     Glucose 106 mg/dL      BUN 38 mg/dL      Creatinine 1.12 mg/dL      Sodium 141 mmol/L      Potassium 4.6 mmol/L      Chloride 106 mmol/L      CO2 24.4 mmol/L      Calcium 9.2 mg/dL      Total Protein 7.2 g/dL      Albumin 3.6 g/dL      ALT (SGPT) 18 U/L      AST (SGOT) 16 U/L      Alkaline Phosphatase 363 U/L      Total Bilirubin 0.3 mg/dL      Globulin 3.6 gm/dL      A/G Ratio 1.0 g/dL      BUN/Creatinine Ratio 33.9     Anion Gap 10.6 mmol/L      eGFR 60.8 mL/min/1.73     Narrative:      GFR Normal >60  Chronic Kidney Disease <60  Kidney Failure <15      POC Glucose Once [[id number]]  (Abnormal) Collected: 10/03/24 2038    Specimen: Blood Updated: 10/03/24 2040     Glucose 200 mg/dL      Comment: Serial Number: [id number]:  [id number]       POC Glucose Once [[id number]]  (Abnormal) Collected: 10/03/24 1643    Specimen: Blood Updated: 10/03/24 1645     Glucose 206 mg/dL      Comment: Serial Number: [id number]:  [id number]       POC Glucose 4x Daily Before Meals & at Bedtime [[id number]]  (Normal) Collected: 10/03/24 1115    Specimen: Blood Updated: 10/03/24 1116     Glucose 101 mg/dL      Comment: Serial Number: [id number]:  [id number]       CBC & Differential [[id number]]  (Abnormal) Collected: 10/03/24 0546    Specimen: Blood Updated: 10/03/24 0738    Narrative:      The following orders were created  for panel order CBC & Differential.  Procedure                               Abnormality         Status                     ---------                               -----------         ------                     CBC Auto Differential[[id number]]        Abnormal            Final result               Scan Slide[[id number]]                                       Final result                 Please view results for these tests on the individual orders.    Scan Slide [[id number]] Collected: 10/03/24 0546    Specimen: Blood Updated: 10/03/24 0738     Scan Slide --     Comment: See Manual Differential Results       Manual Differential [[id number]]  (Abnormal) Collected: 10/03/24 0546    Specimen: Blood Updated: 10/03/24 0738     Neutrophil % 3.0 %      Lymphocyte % 16.0 %      Monocyte % 2.0 %      Basophil % 1.0 %      Myelocyte % 11.0 %      Promyelocyte % 15.0 %      Blasts % 52.0 %      Neutrophils Absolute 0.54 10*3/mm3      Lymphocytes Absolute 2.90 10*3/mm3      Monocytes Absolute 0.36 10*3/mm3      Basophils Absolute 0.18 10*3/mm3      Anisocytosis Slight/1+     WBC Morphology Normal     Large Platelets Slight/1+    Narrative:      Reviewed by Pathologist within the past 30 days on 092624 .      CBC Auto Differential [[id number]]  (Abnormal) Collected: 10/03/24 0546    Specimen: Blood Updated: 10/03/24 0738     WBC 18.13 10*3/mm3      RBC 3.05 10*6/mm3      Hemoglobin 8.6 g/dL      Hematocrit 28.5 %      MCV 93.4 fL      MCH 28.2 pg      MCHC 30.2 g/dL      RDW 18.6 %      RDW-SD 63.6 fl      MPV 10.2 fL      Platelets 201 10*3/mm3     Narrative:      The previously reported component NRBC is no longer being reported. Previous result was 1.0 /100 WBC (Reference Range: 0.0-0.2 /100 WBC) on 10/3/2024 at 0603 EDT.    POC Glucose 4x Daily Before Meals & at Bedtime [[id number]]  (Abnormal) Collected: 10/03/24 0714    Specimen: Blood Updated: 10/03/24 0716     Glucose 123 mg/dL      Comment: Serial Number: [id number]:   [id number]       Comprehensive Metabolic Panel [[id number]]  (Abnormal) Collected: 10/03/24 0546    Specimen: Blood Updated: 10/03/24 0624     Glucose 109 mg/dL      BUN 38 mg/dL      Creatinine 1.17 mg/dL      Sodium 138 mmol/L      Potassium 5.2 mmol/L      Chloride 105 mmol/L      CO2 21.9 mmol/L      Calcium 9.3 mg/dL      Total Protein 7.7 g/dL      Albumin 3.6 g/dL      ALT (SGPT) 16 U/L      AST (SGOT) 17 U/L      Alkaline Phosphatase 425 U/L      Total Bilirubin 0.5 mg/dL      Globulin 4.1 gm/dL      A/G Ratio 0.9 g/dL      BUN/Creatinine Ratio 32.5     Anion Gap 11.1 mmol/L      eGFR 57.7 mL/min/1.73     Narrative:      GFR Normal >60  Chronic Kidney Disease <60  Kidney Failure <15      POC Glucose Once [[id number]]  (Abnormal) Collected: 10/02/24 2108    Specimen: Blood Updated: 10/02/24 2110     Glucose 158 mg/dL      Comment: Serial Number: [id number]:  [id number]       POC Glucose 4x Daily Before Meals & at Bedtime [[id number]]  (Abnormal) Collected: 10/02/24 1855    Specimen: Blood Updated: 10/02/24 1858     Glucose 155 mg/dL      Comment: Serial Number: [id number]:  [id number]       Blood Culture - Blood, Arm, Left [[id number]]  (Normal) Collected: 09/27/24 1815    Specimen: Blood from Arm, Left Updated: 10/02/24 1831     Blood Culture No growth at 5 days    Narrative:      Less than seven (7) mL's of blood was collected.  Insufficient quantity may yield false negative results.    POC Glucose 4x Daily Before Meals & at Bedtime [[id number]]  (Abnormal) Collected: 10/02/24 1120    Specimen: Blood Updated: 10/02/24 1122     Glucose 173 mg/dL      Comment: Serial Number: [id number]:  [id number]                Pending Results:     Imaging Reviewed:   XR Chest 1 View    Result Date: 10/1/2024  Impression: Stable patchy multifocal airspace infiltrates interstitial changes throughout the lungs bilaterally. Electronically Signed: Brian Marquez MD  10/1/2024 2:25 PM EDT  Workstation ID:  [id number]    XR Chest 1 View    Result Date: 9/27/2024  Impression: Patchy right greater than left airspace opacities which may represent infection. Enlarged cardiac silhouette. Electronically Signed: Herbert Hutchinson MD  9/27/2024 5:21 PM EDT  Workstation ID: [id number]          Assessment & Plan   ASSESSMENT  Accelerated phase CML, status post bone marrow biopsy 11/3/2022.  She is currently on treatment with ponatinib 10 mg every other day.  She has not been compliant and was  encouraged to restart her ponatinib at last visit 9/26/2024.  Acute on chronic anemia. Multifactorial  Leukocytosis likely due to underlying infection.  Currently on antibiotics.  Acute hypoxic respiratory insufficiency.  Likely secondary to congestive heart failure, pneumonia.  Cardiology and pulmonology are following.  Currently on 5 L of oxygen nasal cannula and diuresis with Lasix 20 mg daily.    PLAN  Continue supportive care and management per primary team  Continue to monitor CBC and transfuse as needed  Pulmonology and cardiology following  Further recommendations per Dr. Lerma    Electronically signed by Esther Tenorio PA-C, 10/04/24    Thank you for this consult. We will be happy to follow along with you.       This is a 48-year-old female with CML on ponatinib.  She is admitted with respiratory distress.  Hematology/Oncology was consulted.   She was found to be hypoxic while receiving blood transfusion and was admitted to the the ED    Physical exam  She appears ill she has bilateral rhonchi bilateral lower extremity swelling  Doppler studies negative  Reviewed her chest x-ray CT chest  Continue IV antibiotics   Ponatinib is on hold while hospitalized      Follow her CBCs    Discussed with patient      Electronically signed by Meghann Lerma MD, 10/04/24, 7:44 PM EDT.
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
## 2024-10-05 LAB
ALBUMIN SERPL-MCNC: 3.5 G/DL (ref 3.5–5.2)
ALBUMIN SERPL-MCNC: 3.7 G/DL (ref 3.5–5.2)
ALBUMIN/GLOB SERPL: 0.9 G/DL
ALBUMIN/GLOB SERPL: 1 G/DL
ALP SERPL-CCNC: 343 U/L (ref 39–117)
ALP SERPL-CCNC: 362 U/L (ref 39–117)
ALT SERPL W P-5'-P-CCNC: 12 U/L (ref 1–33)
ALT SERPL W P-5'-P-CCNC: 13 U/L (ref 1–33)
ANION GAP SERPL CALCULATED.3IONS-SCNC: 12.9 MMOL/L (ref 5–15)
ANION GAP SERPL CALCULATED.3IONS-SCNC: 9.7 MMOL/L (ref 5–15)
ANISOCYTOSIS BLD QL: ABNORMAL
APTT PPP: 24.5 SECONDS (ref 24–31)
AST SERPL-CCNC: 14 U/L (ref 1–32)
AST SERPL-CCNC: 19 U/L (ref 1–32)
BACTERIA SPEC AEROBE CULT: NO GROWTH
BASOPHILS # BLD MANUAL: 0.84 10*3/MM3 (ref 0–0.2)
BASOPHILS NFR BLD MANUAL: 2 % (ref 0–1.5)
BILIRUB SERPL-MCNC: 0.3 MG/DL (ref 0–1.2)
BILIRUB SERPL-MCNC: 0.3 MG/DL (ref 0–1.2)
BLASTS NFR BLD MANUAL: 45 % (ref 0–0)
BUN SERPL-MCNC: 41 MG/DL (ref 6–20)
BUN SERPL-MCNC: 44 MG/DL (ref 6–20)
BUN/CREAT SERPL: 28.5 (ref 7–25)
BUN/CREAT SERPL: 33.6 (ref 7–25)
CALCIUM SPEC-SCNC: 8.9 MG/DL (ref 8.6–10.5)
CALCIUM SPEC-SCNC: 9.1 MG/DL (ref 8.6–10.5)
CHLORIDE SERPL-SCNC: 104 MMOL/L (ref 98–107)
CHLORIDE SERPL-SCNC: 106 MMOL/L (ref 98–107)
CO2 SERPL-SCNC: 23.1 MMOL/L (ref 22–29)
CO2 SERPL-SCNC: 26.3 MMOL/L (ref 22–29)
CREAT SERPL-MCNC: 1.31 MG/DL (ref 0.57–1)
CREAT SERPL-MCNC: 1.44 MG/DL (ref 0.57–1)
DEPRECATED RDW RBC AUTO: 62.7 FL (ref 37–54)
EGFRCR SERPLBLD CKD-EPI 2021: 45 ML/MIN/1.73
EGFRCR SERPLBLD CKD-EPI 2021: 50.4 ML/MIN/1.73
EOSINOPHIL # BLD MANUAL: 0.84 10*3/MM3 (ref 0–0.4)
EOSINOPHIL NFR BLD MANUAL: 2 % (ref 0.3–6.2)
ERYTHROCYTE [DISTWIDTH] IN BLOOD BY AUTOMATED COUNT: 18.2 % (ref 12.3–15.4)
ERYTHROCYTE [SEDIMENTATION RATE] IN BLOOD: 45 MM/HR (ref 0–20)
FIBRINOGEN PPP-MCNC: 706 MG/DL (ref 210–450)
GLOBULIN UR ELPH-MCNC: 3.6 GM/DL
GLOBULIN UR ELPH-MCNC: 4.1 GM/DL
GLUCOSE BLDC GLUCOMTR-MCNC: 130 MG/DL (ref 70–105)
GLUCOSE BLDC GLUCOMTR-MCNC: 171 MG/DL (ref 70–105)
GLUCOSE BLDC GLUCOMTR-MCNC: 205 MG/DL (ref 70–105)
GLUCOSE BLDC GLUCOMTR-MCNC: 214 MG/DL (ref 70–105)
GLUCOSE SERPL-MCNC: 130 MG/DL (ref 65–99)
GLUCOSE SERPL-MCNC: 205 MG/DL (ref 65–99)
HCT VFR BLD AUTO: 24.5 % (ref 34–46.6)
HGB BLD-MCNC: 7.3 G/DL (ref 12–15.9)
INR PPP: 1.1 (ref 0.93–1.1)
LARGE PLATELETS: ABNORMAL
LDH SERPL-CCNC: 616 U/L (ref 135–214)
LYMPHOCYTES # BLD MANUAL: 7.17 10*3/MM3 (ref 0.7–3.1)
MAGNESIUM SERPL-MCNC: 1.8 MG/DL (ref 1.6–2.6)
MCH RBC QN AUTO: 28.1 PG (ref 26.6–33)
MCHC RBC AUTO-ENTMCNC: 29.8 G/DL (ref 31.5–35.7)
MCV RBC AUTO: 94.2 FL (ref 79–97)
METAMYELOCYTES NFR BLD MANUAL: 4 % (ref 0–0)
MYELOCYTES NFR BLD MANUAL: 9 % (ref 0–0)
NEUTROPHILS # BLD AUTO: 5.06 10*3/MM3 (ref 1.7–7)
NEUTROPHILS NFR BLD MANUAL: 8 % (ref 42.7–76)
NEUTS BAND NFR BLD MANUAL: 4 % (ref 0–5)
PATHOLOGY REVIEW: YES
PHOSPHATE SERPL-MCNC: 3.9 MG/DL (ref 2.5–4.5)
PLATELET # BLD AUTO: 157 10*3/MM3 (ref 140–450)
PMV BLD AUTO: 9.9 FL (ref 6–12)
POIKILOCYTOSIS BLD QL SMEAR: ABNORMAL
POTASSIUM SERPL-SCNC: 4.6 MMOL/L (ref 3.5–5.2)
POTASSIUM SERPL-SCNC: 5.3 MMOL/L (ref 3.5–5.2)
PROMYELOCYTES NFR BLD MANUAL: 9 % (ref 0–0)
PROT SERPL-MCNC: 7.1 G/DL (ref 6–8.5)
PROT SERPL-MCNC: 7.8 G/DL (ref 6–8.5)
PROTHROMBIN TIME: 11.9 SECONDS (ref 9.6–11.7)
RBC # BLD AUTO: 2.6 10*6/MM3 (ref 3.77–5.28)
SCAN SLIDE: NORMAL
SODIUM SERPL-SCNC: 140 MMOL/L (ref 136–145)
SODIUM SERPL-SCNC: 142 MMOL/L (ref 136–145)
URATE SERPL-MCNC: 15.2 MG/DL (ref 2.4–5.7)
VARIANT LYMPHS NFR BLD MANUAL: 17 % (ref 19.6–45.3)
WBC MORPH BLD: NORMAL
WBC NRBC COR # BLD AUTO: 42.17 10*3/MM3 (ref 3.4–10.8)

## 2024-10-05 PROCEDURE — 85007 BL SMEAR W/DIFF WBC COUNT: CPT | Performed by: NURSE PRACTITIONER

## 2024-10-05 PROCEDURE — 25010000002 RASBURICASE PER 0.5 MG: Performed by: STUDENT IN AN ORGANIZED HEALTH CARE EDUCATION/TRAINING PROGRAM

## 2024-10-05 PROCEDURE — 83735 ASSAY OF MAGNESIUM: CPT | Performed by: PHYSICIAN ASSISTANT

## 2024-10-05 PROCEDURE — 82948 REAGENT STRIP/BLOOD GLUCOSE: CPT | Performed by: STUDENT IN AN ORGANIZED HEALTH CARE EDUCATION/TRAINING PROGRAM

## 2024-10-05 PROCEDURE — 85652 RBC SED RATE AUTOMATED: CPT | Performed by: INTERNAL MEDICINE

## 2024-10-05 PROCEDURE — 84550 ASSAY OF BLOOD/URIC ACID: CPT | Performed by: STUDENT IN AN ORGANIZED HEALTH CARE EDUCATION/TRAINING PROGRAM

## 2024-10-05 PROCEDURE — 86037 ANCA TITER EACH ANTIBODY: CPT | Performed by: INTERNAL MEDICINE

## 2024-10-05 PROCEDURE — 85384 FIBRINOGEN ACTIVITY: CPT | Performed by: PHYSICIAN ASSISTANT

## 2024-10-05 PROCEDURE — 86235 NUCLEAR ANTIGEN ANTIBODY: CPT | Performed by: INTERNAL MEDICINE

## 2024-10-05 PROCEDURE — 85025 COMPLETE CBC W/AUTO DIFF WBC: CPT | Performed by: NURSE PRACTITIONER

## 2024-10-05 PROCEDURE — 94618 PULMONARY STRESS TESTING: CPT

## 2024-10-05 PROCEDURE — 83615 LACTATE (LD) (LDH) ENZYME: CPT | Performed by: PHYSICIAN ASSISTANT

## 2024-10-05 PROCEDURE — 83516 IMMUNOASSAY NONANTIBODY: CPT | Performed by: INTERNAL MEDICINE

## 2024-10-05 PROCEDURE — 84100 ASSAY OF PHOSPHORUS: CPT | Performed by: PHYSICIAN ASSISTANT

## 2024-10-05 PROCEDURE — 94761 N-INVAS EAR/PLS OXIMETRY MLT: CPT

## 2024-10-05 PROCEDURE — 86225 DNA ANTIBODY NATIVE: CPT | Performed by: INTERNAL MEDICINE

## 2024-10-05 PROCEDURE — 63710000001 INSULIN LISPRO (HUMAN) PER 5 UNITS: Performed by: STUDENT IN AN ORGANIZED HEALTH CARE EDUCATION/TRAINING PROGRAM

## 2024-10-05 PROCEDURE — 63710000001 DIPHENHYDRAMINE PER 50 MG: Performed by: STUDENT IN AN ORGANIZED HEALTH CARE EDUCATION/TRAINING PROGRAM

## 2024-10-05 PROCEDURE — 80053 COMPREHEN METABOLIC PANEL: CPT | Performed by: NURSE PRACTITIONER

## 2024-10-05 PROCEDURE — 25010000002 FUROSEMIDE PER 20 MG: Performed by: INTERNAL MEDICINE

## 2024-10-05 PROCEDURE — 85730 THROMBOPLASTIN TIME PARTIAL: CPT | Performed by: PHYSICIAN ASSISTANT

## 2024-10-05 PROCEDURE — 85610 PROTHROMBIN TIME: CPT | Performed by: PHYSICIAN ASSISTANT

## 2024-10-05 PROCEDURE — 94799 UNLISTED PULMONARY SVC/PX: CPT

## 2024-10-05 PROCEDURE — 84550 ASSAY OF BLOOD/URIC ACID: CPT | Performed by: PHYSICIAN ASSISTANT

## 2024-10-05 PROCEDURE — 99232 SBSQ HOSP IP/OBS MODERATE 35: CPT | Performed by: STUDENT IN AN ORGANIZED HEALTH CARE EDUCATION/TRAINING PROGRAM

## 2024-10-05 PROCEDURE — 86038 ANTINUCLEAR ANTIBODIES: CPT | Performed by: INTERNAL MEDICINE

## 2024-10-05 PROCEDURE — 99232 SBSQ HOSP IP/OBS MODERATE 35: CPT | Performed by: INTERNAL MEDICINE

## 2024-10-05 PROCEDURE — 63710000001 INSULIN GLARGINE PER 5 UNITS: Performed by: STUDENT IN AN ORGANIZED HEALTH CARE EDUCATION/TRAINING PROGRAM

## 2024-10-05 RX ORDER — DIPHENHYDRAMINE HCL 25 MG
25 CAPSULE ORAL ONCE
Status: COMPLETED | OUTPATIENT
Start: 2024-10-05 | End: 2024-10-05

## 2024-10-05 RX ORDER — ALBUTEROL SULFATE 0.83 MG/ML
2.5 SOLUTION RESPIRATORY (INHALATION) EVERY 6 HOURS PRN
Status: DISCONTINUED | OUTPATIENT
Start: 2024-10-05 | End: 2024-10-10 | Stop reason: HOSPADM

## 2024-10-05 RX ORDER — ALLOPURINOL 100 MG/1
100 TABLET ORAL EVERY 8 HOURS
Status: DISCONTINUED | OUTPATIENT
Start: 2024-10-05 | End: 2024-10-06

## 2024-10-05 RX ORDER — FAMOTIDINE 10 MG/ML
20 INJECTION, SOLUTION INTRAVENOUS ONCE
Status: COMPLETED | OUTPATIENT
Start: 2024-10-05 | End: 2024-10-05

## 2024-10-05 RX ORDER — HYDROXYUREA 500 MG/1
1000 CAPSULE ORAL 2 TIMES DAILY
Status: DISCONTINUED | OUTPATIENT
Start: 2024-10-05 | End: 2024-10-10 | Stop reason: HOSPADM

## 2024-10-05 RX ADMIN — INSULIN GLARGINE 28 UNITS: 100 INJECTION, SOLUTION SUBCUTANEOUS at 21:20

## 2024-10-05 RX ADMIN — ACETAMINOPHEN 650 MG: 325 TABLET, FILM COATED ORAL at 01:03

## 2024-10-05 RX ADMIN — FUROSEMIDE 40 MG: 10 INJECTION, SOLUTION INTRAMUSCULAR; INTRAVENOUS at 17:17

## 2024-10-05 RX ADMIN — AMOXICILLIN AND CLAVULANATE POTASSIUM 1 TABLET: 875; 125 TABLET, FILM COATED ORAL at 10:27

## 2024-10-05 RX ADMIN — HYDROXYUREA 1000 MG: 500 CAPSULE ORAL at 17:12

## 2024-10-05 RX ADMIN — METOPROLOL SUCCINATE 50 MG: 50 TABLET, EXTENDED RELEASE ORAL at 10:27

## 2024-10-05 RX ADMIN — MIRTAZAPINE 15 MG: 15 TABLET, FILM COATED ORAL at 21:20

## 2024-10-05 RX ADMIN — INSULIN LISPRO 3 UNITS: 100 INJECTION, SOLUTION INTRAVENOUS; SUBCUTANEOUS at 18:03

## 2024-10-05 RX ADMIN — GABAPENTIN 800 MG: 400 CAPSULE ORAL at 15:38

## 2024-10-05 RX ADMIN — INSULIN LISPRO 2 UNITS: 100 INJECTION, SOLUTION INTRAVENOUS; SUBCUTANEOUS at 10:33

## 2024-10-05 RX ADMIN — DIPHENHYDRAMINE HYDROCHLORIDE 25 MG: 25 CAPSULE ORAL at 20:23

## 2024-10-05 RX ADMIN — INSULIN LISPRO 7 UNITS: 100 INJECTION, SOLUTION INTRAVENOUS; SUBCUTANEOUS at 10:31

## 2024-10-05 RX ADMIN — Medication 10 ML: at 10:31

## 2024-10-05 RX ADMIN — TIZANIDINE 4 MG: 4 TABLET ORAL at 10:27

## 2024-10-05 RX ADMIN — ALPRAZOLAM 0.5 MG: 0.5 TABLET ORAL at 21:21

## 2024-10-05 RX ADMIN — FUROSEMIDE 40 MG: 10 INJECTION, SOLUTION INTRAMUSCULAR; INTRAVENOUS at 05:41

## 2024-10-05 RX ADMIN — ACETAMINOPHEN 650 MG: 325 TABLET, FILM COATED ORAL at 15:38

## 2024-10-05 RX ADMIN — METOPROLOL SUCCINATE 50 MG: 50 TABLET, EXTENDED RELEASE ORAL at 21:20

## 2024-10-05 RX ADMIN — FAMOTIDINE 20 MG: 10 INJECTION INTRAVENOUS at 20:23

## 2024-10-05 RX ADMIN — INSULIN LISPRO 7 UNITS: 100 INJECTION, SOLUTION INTRAVENOUS; SUBCUTANEOUS at 18:03

## 2024-10-05 RX ADMIN — Medication 10 ML: at 20:24

## 2024-10-05 RX ADMIN — GABAPENTIN 800 MG: 400 CAPSULE ORAL at 05:42

## 2024-10-05 RX ADMIN — GABAPENTIN 800 MG: 400 CAPSULE ORAL at 21:20

## 2024-10-05 RX ADMIN — ACETAMINOPHEN 650 MG: 325 TABLET, FILM COATED ORAL at 21:20

## 2024-10-05 RX ADMIN — SODIUM CHLORIDE 6 MG: 9 INJECTION, SOLUTION INTRAVENOUS at 17:26

## 2024-10-05 RX ADMIN — ALLOPURINOL 100 MG: 100 TABLET ORAL at 17:12

## 2024-10-05 RX ADMIN — LOSARTAN POTASSIUM 12.5 MG: 25 TABLET, FILM COATED ORAL at 10:27

## 2024-10-05 RX ADMIN — AMOXICILLIN AND CLAVULANATE POTASSIUM 1 TABLET: 875; 125 TABLET, FILM COATED ORAL at 21:20

## 2024-10-05 NOTE — PROGRESS NOTES
Patient is somnolent, hypertensive, tachycardic referring Provider: Aristeo Noel MD    Reason for follow-up: HFrEF, tachycardia     Patient Care Team:  Pankaj Stover MD as PCP - General (Internal Medicine)  Meghann Lerma MD as Consulting Physician (Hematology and Oncology)  Hamida Feldman MD as Consulting Physician (Cardiology)  Rohan Jackson MD as Cardiologist (Cardiology)      SUBJECTIVE  Doing well without any chest pain or shortness of breath today.  Lying in bed comfortably     ROS  Review of all systems negative except as indicated.    Since I have last seen, the patient has been without any chest discomfort, shortness of breath, palpitations, dizziness or syncope.  Denies having any headache, abdominal pain, nausea, vomiting, diarrhea, constipation, loss of weight or loss of appetite.  Denies having any excessive bruising, hematuria or blood in the stool.        Personal History:    Past Medical History:   Diagnosis Date    Acute respiratory failure with hypoxia 07/28/2022    Adverse effect of chemotherapy 11/08/2023    Bilateral subdural hematomas 05/18/2023    Bone pain     Chronic constipation 07/07/2023    CML (chronic myelocytic leukemia) 04/01/2018    COVID-19 virus infection 01/12/2022    Diabetes mellitus     Diabetic gastroparesis 07/07/2023    Extremity pain     Carlin. legs pain    Fall during current hospitalization 06/25/2023    Leg pain     left leg greater    Medically noncompliant 03/11/2021    Migraine     Petechial rash 11/08/2023    Pubic ramus fracture, left, closed, initial encounter 06/25/2023    Pulmonary embolism     Traumatic subdural hemorrhage without loss of consciousness 05/17/2023    Tumor lysis syndrome 07/05/2022    UTI (urinary tract infection) 07/06/2023    Vision loss     doing surgery       Past Surgical History:   Procedure Laterality Date    BONE MARROW BIOPSY      BREAST SURGERY      BRONCHOSCOPY N/A 6/6/2022    Procedure: BRONCHOSCOPY bilateral  lung washing;  Surgeon: Charlene Camara MD;  Location: [facility] ENDOSCOPY;  Service: Pulmonary;  Laterality: N/A;  post: rule out infection vs transfusion lung injury     SECTION      CHOLECYSTECTOMY      COLONOSCOPY N/A 2023    Procedure: COLONOSCOPY;  Surgeon: Freddy Villeda MD;  Location: [facility] ENDOSCOPY;  Service: Gastroenterology;  Laterality: N/A;  poor prep    ENDOSCOPY N/A 2023    Procedure: ESOPHAGOGASTRODUODENOSCOPY with biopsy x2 areas;  Surgeon: Freddy Villeda MD;  Location: [facility] ENDOSCOPY;  Service: Gastroenterology;  Laterality: N/A;  food in stomach;abnormal duodenal mucosa    EYE SURGERY      laser surgery due  to hemmorage--- 2021-- another surgery  lt eye11/15/21    RETINAL DETACHMENT SURGERY      SPINE SURGERY      Lombardi spinal block    TUBAL ABDOMINAL LIGATION         Family History   Problem Relation Age of Onset    Diabetes Mother     Diabetes Maternal Grandmother     Heart attack Maternal Grandmother     Stroke Maternal Grandmother        Social History     Tobacco Use    Smoking status: Never    Smokeless tobacco: Never   Vaping Use    Vaping status: Never Used   Substance Use Topics    Alcohol use: No    Drug use: No        Home meds:  Prior to Admission medications    Medication Sig Start Date End Date Taking? Authorizing Provider   acetaminophen (TYLENOL) 325 MG tablet Take 2 tablets by mouth Every 4 (Four) Hours As Needed for Moderate Pain. Indications: Fever, Pain 24  Yes Meghann Lerma MD   ALPRAZolam (Xanax) 0.5 MG tablet Take 1 tablet by mouth At Night As Needed for Anxiety. Indications: Feeling Anxious 24  Yes Denisha Gill APRN   Diclofenac Sodium (VOLTAREN) 1 % gel gel Apply 4 g topically to the appropriate area as directed 4 (Four) Times a Day As Needed (hip pain). 24  Yes Denisha Gill APRN   doxycycline (VIBRAMYICN) 100 MG tablet Take 1 tablet by mouth 2 (Two) Times a Day for 10 days. 9/26/24 10/6/24 Yes Florentin  Meghann Bradshaw MD   furosemide (LASIX) 40 MG tablet Take 1 tablet by mouth Daily. Indications: Edema 9/19/24  Yes Pankaj Stover MD   gabapentin (Neurontin) 800 MG tablet Take 1 tablet by mouth 3 (Three) Times a Day. Ordered through [facility]  Indications: Diabetes with Nerve Disease 5/15/24  Yes Pankaj Stover MD   Insulin Glargine (LANTUS SOLOSTAR) 100 UNIT/ML injection pen Inject 28 Units under the skin into the appropriate area as directed Every Night. Indications: Type 2 Diabetes 9/16/24  Yes Pankaj Stover MD   Insulin Lispro, 1 Unit Dial, (HumaLOG KwikPen) 100 UNIT/ML solution pen-injector Inject 7 Units under the skin into the appropriate area as directed 3 (Three) Times a Day Before Meals. Dx code: E11.65 9/16/24  Yes Pankaj Stover MD   levoFLOXacin (Levaquin) 500 MG tablet Take 1 tablet by mouth Daily. 9/24/24  Yes Meghann Lerma MD   mirtazapine (REMERON SOL-TAB) 15 MG disintegrating tablet Place 1 tablet on the tongue Every Night.   Yes ProviderMelecio MD   pain patient supplied pump by Intrathecal route Continuous.   Yes ProviderMelecio MD   PONATinib HCl (Iclusig) 10 MG tablet Take 10 mg by mouth Every Other Day. Take with or without food.  Swallow whole, do not crush or chew. 8/21/24  Yes Meghann Lerma MD   rivaroxaban (Xarelto) 10 MG tablet Take 1 tablet by mouth Daily. 6/11/24  Yes Denisha Gill APRN   tiZANidine (ZANAFLEX) 4 MG tablet Take 1 tablet by mouth Daily. Indications: Musculoskeletal Pain 5/15/24  Yes Pankaj Stover MD       Allergies:  Contrast dye (echo or unknown ct/mr)    Scheduled Meds:acetaminophen, 650 mg, Oral, Once   Or  acetaminophen, 650 mg, Oral, Once   Or  acetaminophen, 650 mg, Rectal, Once  amoxicillin-clavulanate, 1 tablet, Oral, Q12H  furosemide, 40 mg, Intravenous, BID  gabapentin, 800 mg, Oral, Q8H  insulin glargine, 28 Units, Subcutaneous, Nightly  insulin lispro, 2-7 Units, Subcutaneous, 4x Daily AC & at  "Bedtime  insulin lispro, 7 Units, Subcutaneous, TID With Meals  losartan, 12.5 mg, Oral, Q24H  metoprolol succinate XL, 50 mg, Oral, Q12H  mirtazapine, 15 mg, Oral, Nightly  [Held by provider] rivaroxaban, 10 mg, Oral, Daily  sodium chloride, 10 mL, Intravenous, Q12H  tiZANidine, 4 mg, Oral, Daily      Continuous Infusions:pain,       PRN Meds:.  acetaminophen **OR** acetaminophen **OR** acetaminophen    ALPRAZolam    senna-docusate sodium **AND** polyethylene glycol **AND** bisacodyl **AND** bisacodyl    Calcium Replacement - Follow Nurse / BPA Driven Protocol    dextrose    dextrose    Diclofenac Sodium    glucagon (human recombinant)    hydrALAZINE    Magnesium Standard Dose Replacement - Follow Nurse / BPA Driven Protocol    ondansetron    Phosphorus Replacement - Follow Nurse / BPA Driven Protocol    Potassium Replacement - Follow Nurse / BPA Driven Protocol    [COMPLETED] Insert Peripheral IV **AND** sodium chloride    sodium chloride    sodium chloride      OBJECTIVE    Vital Signs  Vitals:    10/05/24 0321 10/05/24 1027 10/05/24 1102 10/05/24 1121   BP: 96/50 117/79 134/77    BP Location: Right arm Right arm Right arm    Patient Position: Lying Sitting Sitting    Pulse: 99 106 103 99   Resp: 18 14 16 18   Temp: 97.5 °F (36.4 °C)      TempSrc: Oral  Oral    SpO2: 90% 94%  90%   Weight:       Height:           Flowsheet Rows      Flowsheet Row First Filed Value   Admission Height 160 cm (63\") Documented at 09/27/2024 1630   Admission Weight 66.2 kg (146 lb) Documented at 09/27/2024 1630              Intake/Output Summary (Last 24 hours) at 10/5/2024 1214  Last data filed at 10/4/2024 2048  Gross per 24 hour   Intake 490 ml   Output --   Net 490 ml          Telemetry: Sinus rhythm    Physical Exam:  The patient is alert, oriented and in no distress.  Vital signs as noted above.  Head and neck revealed no carotid bruits or jugular venous distention.  No thyromegaly or lymphadenopathy is present  Distant and " diminished breath sounds.  Heart normal first and second heart sounds.  No murmur. No precordial rub is present.  No gallop is present.  Abdomen soft and nontender.  No organomegaly is present.  Extremities with good peripheral pulses with bilateral lower extremity edema  Skin warm and dry.  Musculoskeletal system is grossly normal.  CNS grossly normal.       Results Review:  I have personally reviewed the results from the time of this admission to 10/5/2024 12:14 EDT and agree with these findings:  []  Laboratory  []  Microbiology  []  Radiology  []  EKG/Telemetry   []  Cardiology/Vascular   []  Pathology  []  Old records  []  Other:    Most notable findings include:    Lab Results (last 24 hours)       Procedure Component Value Units Date/Time    Blood Culture - Blood, Hand, Left [[id number]]  (Normal) Collected: 10/04/24 1111    Specimen: Blood from Hand, Left Updated: 10/05/24 1130     Blood Culture No growth at 24 hours    Blood Culture - Blood, Hand, Right [[id number]]  (Normal) Collected: 10/04/24 1111    Specimen: Blood from Hand, Right Updated: 10/05/24 1130     Blood Culture No growth at 24 hours    POC Glucose 4x Daily Before Meals & at Bedtime [[id number]]  (Abnormal) Collected: 10/05/24 1107    Specimen: Blood Updated: 10/05/24 1109     Glucose 214 mg/dL      Comment: Serial Number: [id number]:  [id number]       Uric Acid [[id number]]  (Abnormal) Collected: 10/05/24 0518    Specimen: Blood Updated: 10/05/24 1041     Uric Acid 15.2 mg/dL     Phosphorus [[id number]]  (Normal) Collected: 10/05/24 0518    Specimen: Blood Updated: 10/05/24 1041     Phosphorus 3.9 mg/dL     Lactate Dehydrogenase [[id number]]  (Abnormal) Collected: 10/05/24 0518    Specimen: Blood Updated: 10/05/24 1041      U/L     Magnesium [[id number]]  (Normal) Collected: 10/05/24 0518    Specimen: Blood Updated: 10/05/24 1041     Magnesium 1.8 mg/dL     Peripheral Blood Smear [[id number]] Collected: 10/05/24 0518    Specimen: Blood  Updated: 10/05/24 1037     Pathology Review Yes    POC Glucose 4x Daily Before Meals & at Bedtime [[id number]]  (Abnormal) Collected: 10/05/24 0737    Specimen: Blood Updated: 10/05/24 0741     Glucose 171 mg/dL      Comment: Serial Number: [id number]:  [id number]       Manual Differential [[id number]]  (Abnormal) Collected: 10/05/24 0518    Specimen: Blood Updated: 10/05/24 0650     Neutrophil % 8.0 %      Lymphocyte % 17.0 %      Eosinophil % 2.0 %      Basophil % 2.0 %      Bands %  4.0 %      Metamyelocyte % 4.0 %      Myelocyte % 9.0 %      Promyelocyte % 9.0 %      Blasts % 45.0 %      Neutrophils Absolute 5.06 10*3/mm3      Lymphocytes Absolute 7.17 10*3/mm3      Eosinophils Absolute 0.84 10*3/mm3      Basophils Absolute 0.84 10*3/mm3      Anisocytosis Slight/1+     Poikilocytes Slight/1+     WBC Morphology Normal     Large Platelets Slight/1+    Narrative:      Reviewed by Pathologist within the past 30 days on 09.27.2024.      CBC & Differential [[id number]]  (Abnormal) Collected: 10/05/24 0518    Specimen: Blood Updated: 10/05/24 0650    Narrative:      The following orders were created for panel order CBC & Differential.  Procedure                               Abnormality         Status                     ---------                               -----------         ------                     CBC Auto Differential[[id number]]        Abnormal            Final result               Scan Slide[[id number]]                                       Final result                 Please view results for these tests on the individual orders.    CBC Auto Differential [[id number]]  (Abnormal) Collected: 10/05/24 0518    Specimen: Blood Updated: 10/05/24 0650     WBC 42.17 10*3/mm3      RBC 2.60 10*6/mm3      Hemoglobin 7.3 g/dL      Hematocrit 24.5 %      MCV 94.2 fL      MCH 28.1 pg      MCHC 29.8 g/dL      RDW 18.2 %      RDW-SD 62.7 fl      MPV 9.9 fL      Platelets 157 10*3/mm3     Narrative:      The previously  reported component NRBC is no longer being reported. Previous result was 0.5 /100 WBC (Reference Range: 0.0-0.2 /100 WBC) on 10/5/2024 at 0555 EDT.    Scan Slide [[id number]] Collected: 10/05/24 0518    Specimen: Blood Updated: 10/05/24 0650     Scan Slide --     Comment: See Manual Differential Results       Comprehensive Metabolic Panel [[id number]]  (Abnormal) Collected: 10/05/24 0518    Specimen: Blood Updated: 10/05/24 0549     Glucose 205 mg/dL      BUN 41 mg/dL      Creatinine 1.44 mg/dL      Sodium 140 mmol/L      Potassium 4.6 mmol/L      Chloride 104 mmol/L      CO2 26.3 mmol/L      Calcium 8.9 mg/dL      Total Protein 7.1 g/dL      Albumin 3.5 g/dL      ALT (SGPT) 13 U/L      AST (SGOT) 14 U/L      Alkaline Phosphatase 343 U/L      Total Bilirubin 0.3 mg/dL      Globulin 3.6 gm/dL      A/G Ratio 1.0 g/dL      BUN/Creatinine Ratio 28.5     Anion Gap 9.7 mmol/L      eGFR 45.0 mL/min/1.73     Narrative:      GFR Normal >60  Chronic Kidney Disease <60  Kidney Failure <15      POC Glucose Once [[id number]]  (Abnormal) Collected: 10/04/24 2038    Specimen: Blood Updated: 10/04/24 2039     Glucose 171 mg/dL      Comment: Serial Number: [id number]:  [id number]       CBC & Differential [[id number]]  (Abnormal) Collected: 10/04/24 1545    Specimen: Blood Updated: 10/04/24 1652    Narrative:      The following orders were created for panel order CBC & Differential.  Procedure                               Abnormality         Status                     ---------                               -----------         ------                     CBC Auto Differential[[id number]]        Abnormal            Final result               Scan Slide[[id number]]                                       Final result                 Please view results for these tests on the individual orders.    CBC Auto Differential [[id number]]  (Abnormal) Collected: 10/04/24 1545    Specimen: Blood Updated: 10/04/24 1652     WBC 37.23 10*3/mm3       RBC 2.81 10*6/mm3      Hemoglobin 8.0 g/dL      Hematocrit 26.2 %      MCV 93.2 fL      MCH 28.5 pg      MCHC 30.5 g/dL      RDW 18.4 %      RDW-SD 63.0 fl      MPV 10.3 fL      Platelets 192 10*3/mm3     Narrative:      The previously reported component NRBC is no longer being reported. Previous result was 0.6 /100 WBC (Reference Range: 0.0-0.2 /100 WBC) on 10/4/2024 at 1603 EDT.    Scan Slide [[id number]] Collected: 10/04/24 1545    Specimen: Blood Updated: 10/04/24 1652     Scan Slide --     Comment: See Manual Differential Results       Manual Differential [[id number]]  (Abnormal) Collected: 10/04/24 1545    Specimen: Blood Updated: 10/04/24 1652     Neutrophil % 8.0 %      Lymphocyte % 19.0 %      Monocyte % 6.0 %      Basophil % 1.0 %      Bands %  2.0 %      Myelocyte % 5.0 %      Blasts % 59.0 %      Neutrophils Absolute 3.72 10*3/mm3      Lymphocytes Absolute 7.07 10*3/mm3      Monocytes Absolute 2.23 10*3/mm3      Basophils Absolute 0.37 10*3/mm3      Anisocytosis Slight/1+     Ovalocytes Slight/1+     Poikilocytes Slight/1+     Polychromasia Slight/1+     Stomatocytes Slight/1+     WBC Morphology Normal     Large Platelets Slight/1+    Narrative:      Reviewed by Pathologist within the past 30 days on 9/26/24 .     POC Glucose Once [[id number]]  (Abnormal) Collected: 10/04/24 1643    Specimen: Blood Updated: 10/04/24 1645     Glucose 166 mg/dL      Comment: Serial Number: [id number]:  [id number]       POC Glucose Once [[id number]]  (Abnormal) Collected: 10/04/24 1344    Specimen: Blood Updated: 10/04/24 1346     Glucose 106 mg/dL      Comment: Serial Number: [id number]:  [id number]               Imaging Results (Last 24 Hours)       Procedure Component Value Units Date/Time    CT Chest Without Contrast Diagnostic [[id number]] Collected: 10/04/24 1634     Updated: 10/04/24 1659    Narrative:      CT CHEST WO CONTRAST DIAGNOSTIC    Date of Exam: 10/4/2024 4:20 PM EDT    Indication:  cough.    Comparison: 3/19/2024    Technique: Axial CT images were obtained of the chest without contrast administration.  Sagittal and coronal reconstructions were performed.  Automated exposure control and iterative reconstruction methods were used.    FINDINGS:  There are extensive ill-defined multifocal infiltrates throughout the lungs bilaterally with interstitial changes. More pronounced consolidations are noted within the right middle lobe, right lower lobe, and left lower lobe. The findings suggest   developing atypical/viral infection or multifocal pneumonia. No pleural effusions are seen.    No significant hilar, mediastinal, or axillary lymphadenopathy is observed. A normal aortic arch branching pattern is identified. No significant coronary artery calcifications are identified. The thyroid gland is stable. The esophagus is unremarkable.    The limited evaluation of the upper abdomen demonstrates no evidence for acute abnormality.    No acute osseous abnormalities are observed.      Impression:      1.Evidence for recurrent or progressing atypical/viral infection versus multifocal pneumonia. Other chronic inflammatory conditions with waxing and waning presentation could potentially be considered such as sarcoidosis. Recommend correlation for signs   or symptoms of acute infection and follow-up to ensure improvement/resolution.        Electronically Signed: Brian Marquez MD    10/4/2024 4:57 PM EDT    Workstation ID: [id number]            LAB RESULTS (LAST 7 DAYS)    CBC  Results from last 7 days   Lab Units 10/05/24  0518 10/04/24  1545 10/04/24  0330 10/03/24  0546 10/02/24  0551 10/01/24  1732 10/01/24  0308 09/30/24  0401   WBC 10*3/mm3 42.17* 37.23* 28.17* 18.13* 12.86*  --  7.30 5.47   RBC 10*6/mm3 2.60* 2.81* 2.72* 3.05* 2.99*  --  2.50* 2.51*   HEMOGLOBIN g/dL 7.3* 8.0* 7.5* 8.6* 8.3* 8.0* 6.9* 7.0*   HEMATOCRIT % 24.5* 26.2* 25.4* 28.5* 27.5* 26.0* 23.1* 23.3*   MCV fL 94.2 93.2 93.4 93.4 92.0  --   92.4 92.8   PLATELETS 10*3/mm3 157 192 201 201 216  --  219 280       BMP  Results from last 7 days   Lab Units 10/05/24  0518 10/04/24  0330 10/03/24  0546 10/02/24  0551 10/01/24  0308 09/30/24  0401 09/29/24  1810   SODIUM mmol/L 140 141 138 141 139 140 141   POTASSIUM mmol/L 4.6 4.6 5.2 5.3* 4.8 4.8 4.8   CHLORIDE mmol/L 104 106 105 108* 107 106 106   CO2 mmol/L 26.3 24.4 21.9* 22.9 22.0 22.4 22.6   BUN mg/dL 41* 38* 38* 37* 39* 37* 38*   CREATININE mg/dL 1.44* 1.12* 1.17* 1.00 1.12* 1.17* 1.12*   GLUCOSE mg/dL 205* 106* 109* 165* 131* 163* 162*   MAGNESIUM mg/dL 1.8  --   --   --   --   --   --    PHOSPHORUS mg/dL 3.9  --   --   --   --   --   --        CMP   Results from last 7 days   Lab Units 10/05/24  0518 10/04/24  0330 10/03/24  0546 10/02/24  0551 10/01/24  0308 09/30/24  0401 09/29/24  1810   SODIUM mmol/L 140 141 138 141 139 140 141   POTASSIUM mmol/L 4.6 4.6 5.2 5.3* 4.8 4.8 4.8   CHLORIDE mmol/L 104 106 105 108* 107 106 106   CO2 mmol/L 26.3 24.4 21.9* 22.9 22.0 22.4 22.6   BUN mg/dL 41* 38* 38* 37* 39* 37* 38*   CREATININE mg/dL 1.44* 1.12* 1.17* 1.00 1.12* 1.17* 1.12*   GLUCOSE mg/dL 205* 106* 109* 165* 131* 163* 162*   ALBUMIN g/dL 3.5 3.6 3.6 3.7 3.6 3.6  --    BILIRUBIN mg/dL 0.3 0.3 0.5 0.6 0.7 1.0  --    ALK PHOS U/L 343* 363* 425* 449* 431* 422*  --    AST (SGOT) U/L 14 16 17 15 21 30  --    ALT (SGPT) U/L 13 18 16 18 25 35*  --        BNP        TROPONIN          CoAg          Creatinine Clearance  Estimated Creatinine Clearance: 44.7 mL/min (A) (by C-G formula based on SCr of 1.44 mg/dL (H)).    ABG        Radiology  CT Chest Without Contrast Diagnostic    Result Date: 10/4/2024  1.Evidence for recurrent or progressing atypical/viral infection versus multifocal pneumonia. Other chronic inflammatory conditions with waxing and waning presentation could potentially be considered such as sarcoidosis. Recommend correlation for signs or symptoms of acute infection and follow-up to ensure  improvement/resolution. Electronically Signed: Brian Marquez MD  10/4/2024 4:57 PM EDT  Workstation ID: [id number]       EKG  I personally viewed and interpreted the patient's EKG/Telemetry data:  ECG 12 Lead Rhythm Change   Final Result   HEART IVTY=202  bpm   RR Uconiglw=542  ms   IN Cajnansl=248  ms   P Horizontal Axis=  deg   P Front Axis=11  deg   QRSD Interval=88  ms   QT Twmbijmz=801  ms   BQeC=405  ms   QRS Axis=-3  deg   T Wave Axis=9  deg   - BORDERLINE ECG -   Sinus tachycardia with irregular rate   Low voltage, extremity leads   Consider  anterior infarct   When compared with ECG of 30-Sep-2024 00:46:19,   Significant change in rhythm: previously sinus   Electronically Signed By: Rohan Jackson ([facility]) 2024-10-04 08:04:39   Date and Time of Study:2024-10-02 09:50:00      ECG 12 Lead Tachycardia   Preliminary Result   HEART WOVY=083  bpm   RR Dizxveqf=317  ms   IN Tzvtpcts=734  ms   P Horizontal Axis=-69  deg   P Front Axis=23  deg   QRSD Interval=89  ms   QT Fpfllojj=747  ms   FOmG=201  ms   QRS Axis=-9  deg   T Wave Axis=29  deg   - BORDERLINE ECG -   Sinus tachycardia   Low voltage, extremity leads   Consider anterior infarct   Date and Time of Study:2024-09-30 00:46:19      ECG 12 Lead Dyspnea   Preliminary Result   HEART LMDA=424  bpm   RR Gbkdiwmq=972  ms   IN Bjmvatji=554  ms   P Horizontal Axis=7  deg   P Front Axis=54  deg   QRSD Interval=90  ms   QT Aswlrjwq=719  ms   JYpT=138  ms   QRS Axis=49  deg   T Wave Axis=39  deg   - OTHERWISE NORMAL ECG -   Sinus tachycardia   Low voltage, extremity leads   Date and Time of Study:2024-09-27 16:53:24      Telemetry Scan   Final Result      Telemetry Scan   Final Result      Telemetry Scan   Final Result      Telemetry Scan   Final Result      Telemetry Scan   Final Result      Telemetry Scan   Final Result      Telemetry Scan   Final Result      Telemetry Scan   Final Result      Telemetry Scan   Final Result      Telemetry Scan   Final Result      Telemetry  Scan   Final Result      Telemetry Scan   Final Result      Telemetry Scan   Final Result      Telemetry Scan   Final Result      Telemetry Scan   Final Result      Telemetry Scan   Final Result      Telemetry Scan   Final Result      Telemetry Scan   Final Result      Telemetry Scan   Final Result      Telemetry Scan   Final Result      Telemetry Scan   Final Result      Telemetry Scan   Final Result      Telemetry Scan   Final Result      Telemetry Scan   Final Result      Telemetry Scan   Final Result      Telemetry Scan   Final Result      Telemetry Scan   Final Result      Telemetry Scan   Final Result      Telemetry Scan   Final Result      Telemetry Scan   Final Result      Telemetry Scan   Final Result      Telemetry Scan   Final Result      Telemetry Scan   Final Result      Telemetry Scan   Final Result      Telemetry Scan   Final Result      Telemetry Scan   Final Result      Telemetry Scan   Final Result      Telemetry Scan   Final Result      Telemetry Scan   Final Result      Telemetry Scan   Final Result      Telemetry Scan   Final Result      Telemetry Scan   Final Result      Telemetry Scan   Final Result            Echocardiogram:    Results for orders placed during the hospital encounter of 09/27/24    Adult Transthoracic Echo Complete W/ Cont if Necessary Per Protocol    Interpretation Summary    Left ventricular systolic function is normal. Left ventricular ejection fraction appears to be 61 - 65%.    Left ventricular wall thickness is consistent with borderline concentric hypertrophy.    Left ventricular diastolic function was normal.    The right ventricular cavity is borderline dilated.    Estimated right ventricular systolic pressure from tricuspid regurgitation is mildly elevated (35-45 mmHg).    Mild pulmonary hypertension is present.        Stress Test:         Cardiac Catheterization:  No results found for this or any previous visit.         Other:         ASSESSMENT & PLAN:    Acute on  chronic HFrEF (heart failure with reduced ejection fraction) secondary to NICM (nonischemic cardiomyopathy)  Bilateral lower extremity edema  Previous EF 26-30% in Nov. 2022.  Repeat echocardiogram September 2024 shows preserved LV function, ?normal diastolic function with mild pulmonary hypertension  Technically difficult study.  IVC is 2.4 cm  Patient has been noncompliant with medications and outpatient follow up.  High-sensitivity troponin of 17 and 22, proBNP of 4000  Previously proBNP was 12,000  Currently on IV diuretics  Monitor I's and O's and replace electrolytes as needed  Continue losartan, Toprol-XL  Resume Jardiance  Emphasized importance of compliance with medications and follow-up  Low-salt diet discussed with the patient  Blood pressure and heart rate are stable on Toprol and losartan     Acute respiratory failure with hypoxia  Likely multifactorial, secondary to pneumonia, CHF, and anemia   Completed antibiotics  Remains on 5 L of oxygen  Wean as tolerated  Patient is seen by pulmonologist and has Streptococcus bacteremia and is currently on oxygen Augmentin     Anemia due to chemotherapy  H&H 7.5/25.4 now  Xarelto is on hold  May need another blood transfusion     CML (chronic myelocytic leukemia)  On chemotherapy, followed by oncology   White count is 28,000     Medically noncompliant  Compliance encouraged  / consulted   HF Nurse Navigator consulted as above     Type 2 diabetes mellitus with diabetic polyneuropathy, with long-term current use of insulin  A1c of 8.2  On Lantus and SSI     Sinus tachycardia  Due to underlying infection, anemia and hypoxia  Expected to improve with transfusion  Will need additional diuretics with blood transfusion today  Treat underlying causes  Continue Toprol-XL 50 mg twice daily  Heart rate is better      Jose Sharma MD  10/05/24  12:14 EDT

## 2024-10-05 NOTE — PROGRESS NOTES
[facility] MEDICINE SERVICE  DAILY PROGRESS NOTE    NAME: Nieves Mc  : 1975  MRN: [id number]      LOS: 8 days     PROVIDER OF SERVICE: Aristeo Noel MD    Chief Complaint: Acute hypoxemic respiratory failure    Subjective:     Interval History:  History taken from: patient      History of Present Illness: Nieves Mc is a 48 y.o. female with a PMH of CML on ponatinib, chronic anemia, leukopenia on bilateral subdural hematoma, systolic CHF, Diabetes type 1, PE on xarelto, anxiety who presented to [facility] on 2024 with shhypoxia. Patient was evaluated earlier at cancer center for a blood transfusion, was then found to be hypoxic SPO2 76 %. Patient was subsequently sent to ED for evaluation. Denies chest pain, fever chills, hemoptysis. She also denies SOB. Reports bilateral LE swelling.  Evaluation in ED patient saturating 86 %, requiring 3 L NC to maintain SPO2 > 90 %. Tachycardic in mid 110s, afebrile, slightly hypertensive. CXR done showed patchy right greater than left airspace opacities. CBC labs notable for neutropenia, Hb 7.7. Chemistry labs T bili 1.8, creat 1.29, BUN 34. HS trop marginally elevated 17, proBNP 4.257. The decision to admit was made.      24 seen in bd NAD, C/O dyspnea, on 5lts oxygen  24 still c/o dyspnea on 5 lts, bp stable, will likely need Oxygen on dc. GIRMA RN  24 patient seen and examined in bed no acute distress, heart rate 133, dyspnea on 5 L, hemoglobin 7, will transfuse 1 units of packed RBC, discussed with RN.  10/1: received 1PRBC  10/2: Hb stable post transfusion, denies any new complaints   10/3: WBC is trending up, septic w/u ordered, pulmonary and Oncology to see the patient today  10/5/024 patient seen and examined in bed no acute distress patient is complaining of weakness fatigue shortness of breath.  Presently only on 4 L.  Saturation 88 to 89%.  Patient heart rate is 115.  Discussed with RN.  Patient wants to go home  on discharge.    Review of Systems  Constitutional:  Positive for fatigue. Negative for fever.   HENT:  Positive for congestion.    Respiratory:  Positive for cough and shortness of breath. Negative for chest tightness.    Cardiovascular:  Negative for chest pain.   Gastrointestinal:  Negative for abdominal pain.   Genitourinary:  Negative for dysuria.   Psychiatric/Behavioral:  Negative for confusion.    Objective:     Vital Signs  Temp:  [97.5 °F (36.4 °C)-98.2 °F (36.8 °C)] 97.5 °F (36.4 °C)  Heart Rate:  [] 99  Resp:  [14-20] 18  BP: ()/(50-79) 134/77  Flow (L/min):  [4-5] 4   Body mass index is 25.06 kg/m².    Physical Exam  General: Alert and oriented, no acute distress. Pale looking  HENT: Normocephalic, moist oral mucosa, no scleral icterus.  Neck: Supple, nontender, no carotid bruits, no JVD, no LAD.  Lungs: bilateral rales   Heart: RRR, no murmur, gallop or edema.  Abdomen: Soft, nontender, nondistended, + bowel sounds.  Musculoskeletal: pedal edema   Neuro: alert and awake, moving all 4 extremities   Skin: Skin is warm, dry and pink, no rashes or lesions.  Psychiatric: Cooperative, appropriate mood and affect.         Diagnostic Data    Results from last 7 days   Lab Units 10/05/24  0518   WBC 10*3/mm3 42.17*   HEMOGLOBIN g/dL 7.3*   HEMATOCRIT % 24.5*   PLATELETS 10*3/mm3 157   GLUCOSE mg/dL 205*   CREATININE mg/dL 1.44*   BUN mg/dL 41*   SODIUM mmol/L 140   POTASSIUM mmol/L 4.6   AST (SGOT) U/L 14   ALT (SGPT) U/L 13   ALK PHOS U/L 343*   BILIRUBIN mg/dL 0.3   ANION GAP mmol/L 9.7       CT Chest Without Contrast Diagnostic    Result Date: 10/4/2024  1.Evidence for recurrent or progressing atypical/viral infection versus multifocal pneumonia. Other chronic inflammatory conditions with waxing and waning presentation could potentially be considered such as sarcoidosis. Recommend correlation for signs or symptoms of acute infection and follow-up to ensure improvement/resolution. Electronically  Signed: Brian Marquez MD  10/4/2024 4:57 PM EDT  Workstation ID: [id number]     Scheduled Meds:acetaminophen, 650 mg, Oral, Once   Or  acetaminophen, 650 mg, Oral, Once   Or  acetaminophen, 650 mg, Rectal, Once  amoxicillin-clavulanate, 1 tablet, Oral, Q12H  furosemide, 40 mg, Intravenous, BID  gabapentin, 800 mg, Oral, Q8H  insulin glargine, 28 Units, Subcutaneous, Nightly  insulin lispro, 2-7 Units, Subcutaneous, 4x Daily AC & at Bedtime  insulin lispro, 7 Units, Subcutaneous, TID With Meals  losartan, 12.5 mg, Oral, Q24H  metoprolol succinate XL, 50 mg, Oral, Q12H  mirtazapine, 15 mg, Oral, Nightly  [Held by provider] rivaroxaban, 10 mg, Oral, Daily  sodium chloride, 10 mL, Intravenous, Q12H  tiZANidine, 4 mg, Oral, Daily      Continuous Infusions:pain,       PRN Meds:.  acetaminophen **OR** acetaminophen **OR** acetaminophen    ALPRAZolam    senna-docusate sodium **AND** polyethylene glycol **AND** bisacodyl **AND** bisacodyl    Calcium Replacement - Follow Nurse / BPA Driven Protocol    dextrose    dextrose    Diclofenac Sodium    glucagon (human recombinant)    hydrALAZINE    Magnesium Standard Dose Replacement - Follow Nurse / BPA Driven Protocol    ondansetron    Phosphorus Replacement - Follow Nurse / BPA Driven Protocol    Potassium Replacement - Follow Nurse / BPA Driven Protocol    [COMPLETED] Insert Peripheral IV **AND** sodium chloride    sodium chloride    sodium chloride     I reviewed the patient's new clinical results.    Assessment/Plan:     Active and Resolved Problems  Active Hospital Problems    Diagnosis  POA    **Acute hypoxemic respiratory failure [J96.01]  Yes    Type 2 diabetes mellitus with diabetic polyneuropathy, with long-term current use of insulin [E11.42, Z79.4]  Not Applicable     Priority: High    Acute on chronic HFrEF (heart failure with reduced ejection fraction) [I50.23]  Yes    Sinus tachycardia [R00.0]  Yes    Anemia due to chemotherapy [D64.81, T45.1X5A]  Yes    NICM  (nonischemic cardiomyopathy) [I42.8]  Yes    Medically noncompliant [Z91.199]  Not Applicable    History of pulmonary embolism [Z86.711]  Yes    CML (chronic myelocytic leukemia) [C92.10]  Yes      Resolved Hospital Problems   No resolved problems to display.     Acute hypoxemic respiratory failure due to Community Acquired Pneumonia and acute on chronic diastolic CHF  Patient requiring 5 L NC oxygen supplementation to maintain SPO2 > 90 %  CXR showed bilateral airspace opacities R>L  tx cefepime > Augmentin  Follow blood and sputum culture, atypical workup- NGTD  Repeat Xray to see interval change - no acute changes  Pulmonary recommendations noted- Augmentin and IV lasix     Acute on chronic HFrEF (heart failure with reduced ejection fraction) secondary to NICM (nonischemic cardiomyopathy)  Bilateral lower extremity edema  Cardiology consulted  Previous EF 26-30% in Nov. 2022,  Repeat echocardiogram in March 2024 showed improvement in LV function with EF of 62%  Patient has been noncompliant with medications and outpatient follow up  High-sensitivity troponin of 17 and 22, proBNP of 4000  Start Lasix  Monitor I's and O's and replace electrolytes as needed  Creatinine 1.13  continue losartan, Toprol-XL and Jardiance  Pro BNP 4257>2153  -lasix 20 mg IV bid  TTE on 3/21/24 showed LVEF > 62 %, was 44 % on 3/10/24  Wean oxygen as tolerated     Type 1 DM  Continue insulin lantus 28 units HS  Continue lispro 7 units before meals plus SS     CML (chronic myelocytic leukemia)  On chemotherapy, followed by oncology   Chronic anemia  Now with leucocytosis   Completed blood transfusion prior to coming to ED  Continue Ponatinib  Transfuse blood products as needed  Oncology consulted        History of anxiety  Continue Remeron   Continue Xanax    Sinus tachycardia-Due to underlying infection, anemia and hypoxia  Per cardiology Treat underlying causes  Increased Toprol-XL to twice daily    Medically noncompliant  Compliance  encouraged  / consulted   HF Nurse Navigator consulted as above       VTE Prophylaxis:  Pharmacologic VTE prophylaxis orders are present.      Disposition Planning:   Barriers to Discharge:dyspnea  Anticipated Date of Discharge: TBD based on clinical course  Place of Discharge: home      Time: 40 minutes     Code Status and Medical Interventions: CPR (Attempt to Resuscitate); Full Support   Ordered at: 09/28/24 0443     Code Status (Patient has no pulse and is not breathing):    CPR (Attempt to Resuscitate)     Medical Interventions (Patient has pulse or is breathing):    Full Support       Signature: Electronically signed by Aristeo Noel MD, 10/05/24, 12:58 EDT.  [facility] Hospitalist Team

## 2024-10-05 NOTE — PROGRESS NOTES
Daily Progress Note        Acute hypoxemic respiratory failure    CML (chronic myelocytic leukemia)    History of pulmonary embolism    Medically noncompliant    Type 2 diabetes mellitus with diabetic polyneuropathy, with long-term current use of insulin    Sinus tachycardia    NICM (nonischemic cardiomyopathy)    Anemia due to chemotherapy    Acute on chronic HFrEF (heart failure with reduced ejection fraction)    Assessment:        Hypoxic respiratory insufficiency  Streptococcus, alphahemolytic bacteremia on 9/27/2024     Diffuse bilateral groundglass opacities and alveolar infiltrates    Significant leukocytosis with 45% blasts  History of CML  Afebrile    Chronic elevated right hemidiaphragm  Hypertension     Expanded respiratory panel and Legionella and strep urine antigen negative        2D echo 10-24    Left ventricular systolic function is normal. Left ventricular ejection fraction appears to be 61 - 65%.    Left ventricular wall thickness is consistent with borderline concentric hypertrophy.    Left ventricular diastolic function was normal.    The right ventricular cavity is borderline dilated.    Estimated right ventricular systolic pressure from tricuspid regurgitation is mildly elevated (35-45 mmHg).    Mild pulmonary hypertension is present.        Recommendations:     ANCA panel  Extract nuclear panel  JOSE  ESR    Augmentin  Blood cultures  Sputum cultures     Oxygen titration currently on 4 L    Gynecology following      Diuresis Lasix 20 mg po daily                    LOS: 8 days     Subjective         Objective     Vital signs for last 24 hours:  Vitals:    10/04/24 1640 10/04/24 1929 10/04/24 2333 10/05/24 0321   BP: 114/64 107/59 116/59 96/50   BP Location: Right arm Right arm Right arm Right arm   Patient Position: Sitting Sitting Lying Lying   Pulse: 90 95 98 99   Resp: 20 18 14 18   Temp: 98.2 °F (36.8 °C) 98.1 °F (36.7 °C) 97.8 °F (36.6 °C) 97.5 °F (36.4 °C)   TempSrc: Oral Oral Axillary  Oral   SpO2: 94% 96% 91% 90%   Weight:       Height:           Intake/Output last 3 shifts:  I/O last 3 completed shifts:  In: 490 [P.O.:490]  Out: -   Intake/Output this shift:  No intake/output data recorded.      Radiology  Imaging Results (Last 24 Hours)       Procedure Component Value Units Date/Time    CT Chest Without Contrast Diagnostic [[id number]] Collected: 10/04/24 1634     Updated: 10/04/24 1659    Narrative:      CT CHEST WO CONTRAST DIAGNOSTIC    Date of Exam: 10/4/2024 4:20 PM EDT    Indication: cough.    Comparison: 3/19/2024    Technique: Axial CT images were obtained of the chest without contrast administration.  Sagittal and coronal reconstructions were performed.  Automated exposure control and iterative reconstruction methods were used.    FINDINGS:  There are extensive ill-defined multifocal infiltrates throughout the lungs bilaterally with interstitial changes. More pronounced consolidations are noted within the right middle lobe, right lower lobe, and left lower lobe. The findings suggest   developing atypical/viral infection or multifocal pneumonia. No pleural effusions are seen.    No significant hilar, mediastinal, or axillary lymphadenopathy is observed. A normal aortic arch branching pattern is identified. No significant coronary artery calcifications are identified. The thyroid gland is stable. The esophagus is unremarkable.    The limited evaluation of the upper abdomen demonstrates no evidence for acute abnormality.    No acute osseous abnormalities are observed.      Impression:      1.Evidence for recurrent or progressing atypical/viral infection versus multifocal pneumonia. Other chronic inflammatory conditions with waxing and waning presentation could potentially be considered such as sarcoidosis. Recommend correlation for signs   or symptoms of acute infection and follow-up to ensure improvement/resolution.        Electronically Signed: Brian Marquez MD    10/4/2024 4:57 PM EDT     Workstation ID: [id number]            Labs:  Results from last 7 days   Lab Units 10/05/24  0518   WBC 10*3/mm3 42.17*   HEMOGLOBIN g/dL 7.3*   HEMATOCRIT % 24.5*   PLATELETS 10*3/mm3 157     Results from last 7 days   Lab Units 10/05/24  0518   SODIUM mmol/L 140   POTASSIUM mmol/L 4.6   CHLORIDE mmol/L 104   CO2 mmol/L 26.3   BUN mg/dL 41*   CREATININE mg/dL 1.44*   CALCIUM mg/dL 8.9   BILIRUBIN mg/dL 0.3   ALK PHOS U/L 343*   ALT (SGPT) U/L 13   AST (SGOT) U/L 14   GLUCOSE mg/dL 205*         Results from last 7 days   Lab Units 10/05/24  0518 10/04/24  0330 10/03/24  0546   ALBUMIN g/dL 3.5 3.6 3.6                               Meds:   SCHEDULE  acetaminophen, 650 mg, Oral, Once   Or  acetaminophen, 650 mg, Oral, Once   Or  acetaminophen, 650 mg, Rectal, Once  amoxicillin-clavulanate, 1 tablet, Oral, Q12H  furosemide, 40 mg, Intravenous, BID  gabapentin, 800 mg, Oral, Q8H  insulin glargine, 28 Units, Subcutaneous, Nightly  insulin lispro, 2-7 Units, Subcutaneous, 4x Daily AC & at Bedtime  insulin lispro, 7 Units, Subcutaneous, TID With Meals  losartan, 12.5 mg, Oral, Q24H  metoprolol succinate XL, 50 mg, Oral, Q12H  mirtazapine, 15 mg, Oral, Nightly  [Held by provider] rivaroxaban, 10 mg, Oral, Daily  sodium chloride, 10 mL, Intravenous, Q12H  tiZANidine, 4 mg, Oral, Daily      Infusions  pain,       PRNs    acetaminophen **OR** acetaminophen **OR** acetaminophen    ALPRAZolam    senna-docusate sodium **AND** polyethylene glycol **AND** bisacodyl **AND** bisacodyl    Calcium Replacement - Follow Nurse / BPA Driven Protocol    dextrose    dextrose    Diclofenac Sodium    glucagon (human recombinant)    hydrALAZINE    Magnesium Standard Dose Replacement - Follow Nurse / BPA Driven Protocol    ondansetron    Phosphorus Replacement - Follow Nurse / BPA Driven Protocol    Potassium Replacement - Follow Nurse / BPA Driven Protocol    [COMPLETED] Insert Peripheral IV **AND** sodium chloride    sodium chloride    sodium  chloride    Physical Exam:  Physical Exam  Cardiovascular:      Heart sounds: No murmur heard.     No gallop.   Pulmonary:      Effort: No respiratory distress.      Breath sounds: No stridor. Rhonchi and rales present. No wheezing.   Chest:      Chest wall: No tenderness.         ROS  Review of Systems   Respiratory:  Positive for cough and shortness of breath. Negative for wheezing and stridor.    Cardiovascular:  Positive for palpitations and leg swelling. Negative for chest pain.             Total time spent with patient greater than: 45 Minutes

## 2024-10-05 NOTE — PLAN OF CARE
Goal Outcome Evaluation:              Outcome Evaluation: Pt up and down during the night.  Pt attempted to stay in bed.  Remained on 5L NC.  Complaints of a headache during the night see MAR.  Pt wanting to go home today due still requiring O2 which she does not take at home.  Pt with pain pump in place.  Pt stable at this time.

## 2024-10-05 NOTE — PROGRESS NOTES
Hematology/Oncology Inpatient Progress Note    PATIENT NAME: Nieves Mc  : 1975  MRN: [id number]    CHIEF COMPLAINT: Hypoxia    HISTORY OF PRESENT ILLNESS:    Nieves Mc is a 48 y.o. female who presented to [facility] on 2024 with complaints of shortness of breath.  She was found to hypoxic with SpO2 76% while undergoing evaluation for blood transfusion.  She was subsequently sent to the ED for further evaluation.  She does report bilateral leg swelling.  In the ED she was saturating 86% and requiring 3 L nasal cannula to maintain PaO2 over 90%.  She was mildly tachycardic, hypertensive and afebrile.  Chest x-ray completed showed patchy right greater than left airspace opacities.  CBC was notable for neutropenia, anemia.  She was started on IV antibiotics.  Patient is now needing 5 L oxygen.  Cardiology and pulmonology have been consulted.  It appears she has been noncompliant with her cardiac medications and her cardiology outpatient follow-up.  Interval chest x-ray on 10/1/2024 showed stable patchy multifocal airspace infiltrates interstitial changes throughout the lungs bilaterally.  She had duplex ultrasound bilateral lower extremities due to swelling and this was normal.      10/04/24  Hematology/Oncology was consulted.  She is established with Dr. Lerma for CML.  She is currently on treatment with ponatinib, but most recent office visit with Dr. Leram she had stated she has been noncompliant over the last 3 to 4 weeks.  She was encouraged at that visit to restart her treatment.    Subjective   10/5/24: Pt seen today for follow up. Reported that her shortness of breath has improved. However continues to be hypoxic off Oxygen support. She is currently on IV antibiotics.      ROS:  Review of Systems   Constitutional:  Positive for fatigue.   HENT: Negative.     Eyes: Negative.    Respiratory:  Positive for shortness of breath.    Cardiovascular: Negative.    Gastrointestinal:  "Negative.    Endocrine: Negative.    Genitourinary: Negative.    Musculoskeletal: Negative.    Skin: Negative.    Allergic/Immunologic: Negative.    Neurological: Negative.    Hematological: Negative.    Psychiatric/Behavioral: Negative.          MEDICATIONS:    Scheduled Meds:  acetaminophen, 650 mg, Oral, Once   Or  acetaminophen, 650 mg, Oral, Once   Or  acetaminophen, 650 mg, Rectal, Once  amoxicillin-clavulanate, 1 tablet, Oral, Q12H  furosemide, 40 mg, Intravenous, BID  gabapentin, 800 mg, Oral, Q8H  insulin glargine, 28 Units, Subcutaneous, Nightly  insulin lispro, 2-7 Units, Subcutaneous, 4x Daily AC & at Bedtime  insulin lispro, 7 Units, Subcutaneous, TID With Meals  losartan, 12.5 mg, Oral, Q24H  metoprolol succinate XL, 50 mg, Oral, Q12H  mirtazapine, 15 mg, Oral, Nightly  [Held by provider] rivaroxaban, 10 mg, Oral, Daily  sodium chloride, 10 mL, Intravenous, Q12H  tiZANidine, 4 mg, Oral, Daily       Continuous Infusions:  pain,        PRN Meds:    acetaminophen **OR** acetaminophen **OR** acetaminophen    ALPRAZolam    senna-docusate sodium **AND** polyethylene glycol **AND** bisacodyl **AND** bisacodyl    Calcium Replacement - Follow Nurse / BPA Driven Protocol    dextrose    dextrose    Diclofenac Sodium    glucagon (human recombinant)    hydrALAZINE    Magnesium Standard Dose Replacement - Follow Nurse / BPA Driven Protocol    ondansetron    Phosphorus Replacement - Follow Nurse / BPA Driven Protocol    Potassium Replacement - Follow Nurse / BPA Driven Protocol    [COMPLETED] Insert Peripheral IV **AND** sodium chloride    sodium chloride    sodium chloride     ALLERGIES:    Allergies   Allergen Reactions    Contrast Dye (Echo Or Unknown Ct/Mr) Shortness Of Breath and Nausea And Vomiting     Flushed        Objective    VITALS:   BP 96/50 (BP Location: Right arm, Patient Position: Lying)   Pulse 99   Temp 97.5 °F (36.4 °C) (Oral)   Resp 18   Ht 162.6 cm (64\")   Wt 66.2 kg (146 lb)   LMP " 05/25/2022 (Approximate)   SpO2 90%   BMI 25.06 kg/m²     PHYSICAL EXAM: (performed by MD)  Physical Exam  Constitutional:       Appearance: She is normal weight. She is ill-appearing.   HENT:      Head: Normocephalic and atraumatic.      Right Ear: External ear normal.      Left Ear: External ear normal.      Nose: Nose normal.      Mouth/Throat:      Mouth: Mucous membranes are moist.      Pharynx: Oropharynx is clear.   Eyes:      Extraocular Movements: Extraocular movements intact.      Conjunctiva/sclera: Conjunctivae normal.      Pupils: Pupils are equal, round, and reactive to light.   Cardiovascular:      Rate and Rhythm: Normal rate.      Pulses: Normal pulses.   Pulmonary:      Effort: Pulmonary effort is normal.   Abdominal:      General: Abdomen is flat.      Palpations: Abdomen is soft.   Musculoskeletal:         General: Normal range of motion.      Cervical back: Normal range of motion and neck supple.   Skin:     General: Skin is warm.   Neurological:      Mental Status: She is alert.   Psychiatric:         Mood and Affect: Mood normal.         Behavior: Behavior normal.         Thought Content: Thought content normal.         Judgment: Judgment normal.           RECENT LABS:  Lab Results (last 24 hours)       Procedure Component Value Units Date/Time    POC Glucose 4x Daily Before Meals & at Bedtime [[id number]]  (Abnormal) Collected: 10/05/24 0737    Specimen: Blood Updated: 10/05/24 0741     Glucose 171 mg/dL      Comment: Serial Number: [id number]:  [id number]       Manual Differential [[id number]]  (Abnormal) Collected: 10/05/24 0518    Specimen: Blood Updated: 10/05/24 0650     Neutrophil % 8.0 %      Lymphocyte % 17.0 %      Eosinophil % 2.0 %      Basophil % 2.0 %      Bands %  4.0 %      Metamyelocyte % 4.0 %      Myelocyte % 9.0 %      Promyelocyte % 9.0 %      Blasts % 45.0 %      Neutrophils Absolute 5.06 10*3/mm3      Lymphocytes Absolute 7.17 10*3/mm3      Eosinophils Absolute  0.84 10*3/mm3      Basophils Absolute 0.84 10*3/mm3      Anisocytosis Slight/1+     Poikilocytes Slight/1+     WBC Morphology Normal     Large Platelets Slight/1+    Narrative:      Reviewed by Pathologist within the past 30 days on 09.27.2024.      CBC & Differential [[id number]]  (Abnormal) Collected: 10/05/24 0518    Specimen: Blood Updated: 10/05/24 0650    Narrative:      The following orders were created for panel order CBC & Differential.  Procedure                               Abnormality         Status                     ---------                               -----------         ------                     CBC Auto Differential[[id number]]        Abnormal            Final result               Scan Slide[[id number]]                                       Final result                 Please view results for these tests on the individual orders.    CBC Auto Differential [[id number]]  (Abnormal) Collected: 10/05/24 0518    Specimen: Blood Updated: 10/05/24 0650     WBC 42.17 10*3/mm3      RBC 2.60 10*6/mm3      Hemoglobin 7.3 g/dL      Hematocrit 24.5 %      MCV 94.2 fL      MCH 28.1 pg      MCHC 29.8 g/dL      RDW 18.2 %      RDW-SD 62.7 fl      MPV 9.9 fL      Platelets 157 10*3/mm3     Narrative:      The previously reported component NRBC is no longer being reported. Previous result was 0.5 /100 WBC (Reference Range: 0.0-0.2 /100 WBC) on 10/5/2024 at 0555 EDT.    Scan Slide [[id number]] Collected: 10/05/24 0518    Specimen: Blood Updated: 10/05/24 0650     Scan Slide --     Comment: See Manual Differential Results       Comprehensive Metabolic Panel [[id number]]  (Abnormal) Collected: 10/05/24 0518    Specimen: Blood Updated: 10/05/24 0549     Glucose 205 mg/dL      BUN 41 mg/dL      Creatinine 1.44 mg/dL      Sodium 140 mmol/L      Potassium 4.6 mmol/L      Chloride 104 mmol/L      CO2 26.3 mmol/L      Calcium 8.9 mg/dL      Total Protein 7.1 g/dL      Albumin 3.5 g/dL      ALT (SGPT) 13 U/L      AST (SGOT) 14  U/L      Alkaline Phosphatase 343 U/L      Total Bilirubin 0.3 mg/dL      Globulin 3.6 gm/dL      A/G Ratio 1.0 g/dL      BUN/Creatinine Ratio 28.5     Anion Gap 9.7 mmol/L      eGFR 45.0 mL/min/1.73     Narrative:      GFR Normal >60  Chronic Kidney Disease <60  Kidney Failure <15      POC Glucose Once [[id number]]  (Abnormal) Collected: 10/04/24 2038    Specimen: Blood Updated: 10/04/24 2039     Glucose 171 mg/dL      Comment: Serial Number: [id number]:  [id number]       CBC & Differential [[id number]]  (Abnormal) Collected: 10/04/24 1545    Specimen: Blood Updated: 10/04/24 1652    Narrative:      The following orders were created for panel order CBC & Differential.  Procedure                               Abnormality         Status                     ---------                               -----------         ------                     CBC Auto Differential[[id number]]        Abnormal            Final result               Scan Slide[[id number]]                                       Final result                 Please view results for these tests on the individual orders.    CBC Auto Differential [[id number]]  (Abnormal) Collected: 10/04/24 1545    Specimen: Blood Updated: 10/04/24 1652     WBC 37.23 10*3/mm3      RBC 2.81 10*6/mm3      Hemoglobin 8.0 g/dL      Hematocrit 26.2 %      MCV 93.2 fL      MCH 28.5 pg      MCHC 30.5 g/dL      RDW 18.4 %      RDW-SD 63.0 fl      MPV 10.3 fL      Platelets 192 10*3/mm3     Narrative:      The previously reported component NRBC is no longer being reported. Previous result was 0.6 /100 WBC (Reference Range: 0.0-0.2 /100 WBC) on 10/4/2024 at 1603 EDT.    Scan Slide [[id number]] Collected: 10/04/24 1545    Specimen: Blood Updated: 10/04/24 1652     Scan Slide --     Comment: See Manual Differential Results       Manual Differential [[id number]]  (Abnormal) Collected: 10/04/24 1545    Specimen: Blood Updated: 10/04/24 1652     Neutrophil % 8.0 %      Lymphocyte % 19.0 %       Monocyte % 6.0 %      Basophil % 1.0 %      Bands %  2.0 %      Myelocyte % 5.0 %      Blasts % 59.0 %      Neutrophils Absolute 3.72 10*3/mm3      Lymphocytes Absolute 7.07 10*3/mm3      Monocytes Absolute 2.23 10*3/mm3      Basophils Absolute 0.37 10*3/mm3      Anisocytosis Slight/1+     Ovalocytes Slight/1+     Poikilocytes Slight/1+     Polychromasia Slight/1+     Stomatocytes Slight/1+     WBC Morphology Normal     Large Platelets Slight/1+    Narrative:      Reviewed by Pathologist within the past 30 days on 9/26/24 .     POC Glucose Once [[id number]]  (Abnormal) Collected: 10/04/24 1643    Specimen: Blood Updated: 10/04/24 1645     Glucose 166 mg/dL      Comment: Serial Number: [id number]:  [id number]       POC Glucose Once [[id number]]  (Abnormal) Collected: 10/04/24 1344    Specimen: Blood Updated: 10/04/24 1346     Glucose 106 mg/dL      Comment: Serial Number: [id number]:  [id number]       Blood Culture - Blood, Hand, Left [[id number]] Collected: 10/04/24 1111    Specimen: Blood from Hand, Left Updated: 10/04/24 1129    Blood Culture - Blood, Hand, Right [[id number]] Collected: 10/04/24 1111    Specimen: Blood from Hand, Right Updated: 10/04/24 1129    POC Glucose 4x Daily Before Meals & at Bedtime [[id number]]  (Normal) Collected: 10/04/24 1119    Specimen: Blood Updated: 10/04/24 1120     Glucose 95 mg/dL      Comment: Serial Number: [id number]:  [id number]       Urinalysis, Microscopic Only - Urine, Clean Catch [[id number]]  (Abnormal) Collected: 10/04/24 0934    Specimen: Urine, Clean Catch Updated: 10/04/24 1017     RBC, UA 0-2 /HPF      WBC, UA 6-10 /HPF      Bacteria, UA None Seen /HPF      Squamous Epithelial Cells, UA 3-6 /HPF      Yeast, UA Small/1+ Budding Yeast /HPF      Hyaline Casts, UA 3-6 /LPF      Methodology Manual Light Microscopy    Urine Culture - Urine, Urine, Clean Catch [[id number]] Collected: 10/04/24 0934    Specimen: Urine, Clean Catch Updated: 10/04/24 1017     Urinalysis With Culture If Indicated - Urine, Clean Catch [[id number]]  (Abnormal) Collected: 10/04/24 0934    Specimen: Urine, Clean Catch Updated: 10/04/24 1004     Color, UA Yellow     Appearance, UA Hazy     Comment: Result checked          pH, UA <=5.0     Specific Gravity, UA 1.014     Glucose, UA Negative     Ketones, UA Trace     Bilirubin, UA Negative     Blood, UA Trace     Protein,  mg/dL (2+)     Leuk Esterase, UA Negative     Nitrite, UA Negative     Urobilinogen, UA 0.2 E.U./dL    Narrative:      In absence of clinical symptoms, the presence of pyuria, bacteria, and/or nitrites on the urinalysis result does not correlate with infection.            PENDING RESULTS:     IMAGING REVIEWED:  CT Chest Without Contrast Diagnostic    Result Date: 10/4/2024  1.Evidence for recurrent or progressing atypical/viral infection versus multifocal pneumonia. Other chronic inflammatory conditions with waxing and waning presentation could potentially be considered such as sarcoidosis. Recommend correlation for signs or symptoms of acute infection and follow-up to ensure improvement/resolution. 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   -As per chart review, She has not been compliant and was encouraged to restart at last visit 9/26/2024.  Although patient disputed this and stated that she has been compliant except for last 1-2 weeks.  -Ponatinib on hold for now.   -labs reviewed, Noted to have uptrending Blast count and immature forms, 45% blasts noted.  -Coag parameters normal. S. Mg and P levels WNL. Renal function is stable.  -Fibrinogen levels are sent, pending. Flow Cytometry is pending.  -overall picture is concerning for transformation to blast crisis  given >30% blasts in peripheral circulation.  -Will plan to start her on Rasburicase 6mg Daily,  -She has low Hb 7.3 and Plt count 157, Will start at Hydrea 1g BID and monitor CBC closely.   -discussed with [facility] BMT service for potential transfer of the patient to BMT service, she was not considered a candidate for transfer at this time while awaiting further workup.    3. Hyperuricemia:  S. Uric acid 15.2 today. Will start Rasburicase and allopurinol  Monitor labs daily.    4/ ACute on chronic anemia.  Multifactorial    5. Acute hypoxic respiratory insufficiency.  Likely secondary to congestive heart failure, pneumonia.    Cardiology and pulmonology are following.  Currently on 5 L of oxygen nasal cannula and diuresis with Lasix.      Electronically signed by Esther Tenorio PA-C          I have reviewed and confirmed the accuracy of the patient's history: Chief complaint, HPI, ROS, Subjective, and Past Family Social History as entered by the APRN/PAANKUSH.  Pertinent changes have been made to these sections to reflect my personal evaluation and exam findings.         Austin Vaughan MD 10/05/24

## 2024-10-05 NOTE — NURSING NOTE
Placed call out to oncology pt received Elitex for high uric acid levels. Patient developed SOB. Stopped infusion. Patient only received 25ml of the bag.

## 2024-10-05 NOTE — PLAN OF CARE
Goal Outcome Evaluation:           Progress: improving  Outcome Evaluation: Pt was able to ambulate to BSC with assistance. Patient remains on 5 L O2.                                warm/no numbness/no tingling/no discoloration

## 2024-10-05 NOTE — PROGRESS NOTES
Patient is on 4L NC at this time. RT attempted to perform walking oximetry and was not able to complete due to significant desaturation.     Patient has a hip fracture.       Anette JENSEN informed RT that the patient will not be DC.

## 2024-10-06 LAB
ALBUMIN SERPL-MCNC: 3.6 G/DL (ref 3.5–5.2)
ALBUMIN SERPL-MCNC: 3.8 G/DL (ref 3.5–5.2)
ALBUMIN/GLOB SERPL: 1.1 G/DL
ALBUMIN/GLOB SERPL: 1.1 G/DL
ALP SERPL-CCNC: 311 U/L (ref 39–117)
ALP SERPL-CCNC: 330 U/L (ref 39–117)
ALT SERPL W P-5'-P-CCNC: 12 U/L (ref 1–33)
ALT SERPL W P-5'-P-CCNC: 13 U/L (ref 1–33)
ANION GAP SERPL CALCULATED.3IONS-SCNC: 7.6 MMOL/L (ref 5–15)
ANION GAP SERPL CALCULATED.3IONS-SCNC: 9.1 MMOL/L (ref 5–15)
ANISOCYTOSIS BLD QL: ABNORMAL
ANISOCYTOSIS BLD QL: ABNORMAL
AST SERPL-CCNC: 15 U/L (ref 1–32)
AST SERPL-CCNC: 19 U/L (ref 1–32)
BASOPHILS # BLD MANUAL: 1.39 10*3/MM3 (ref 0–0.2)
BASOPHILS # BLD MANUAL: 2.52 10*3/MM3 (ref 0–0.2)
BASOPHILS NFR BLD MANUAL: 3 % (ref 0–1.5)
BASOPHILS NFR BLD MANUAL: 6 % (ref 0–1.5)
BILIRUB SERPL-MCNC: 0.3 MG/DL (ref 0–1.2)
BILIRUB SERPL-MCNC: 0.4 MG/DL (ref 0–1.2)
BLASTS NFR BLD MANUAL: 51 % (ref 0–0)
BLASTS NFR BLD MANUAL: 53 % (ref 0–0)
BUN SERPL-MCNC: 41 MG/DL (ref 6–20)
BUN SERPL-MCNC: 41 MG/DL (ref 6–20)
BUN/CREAT SERPL: 37.3 (ref 7–25)
BUN/CREAT SERPL: 38.7 (ref 7–25)
C3 FRG RBC-MCNC: ABNORMAL
CALCIUM SPEC-SCNC: 8.6 MG/DL (ref 8.6–10.5)
CALCIUM SPEC-SCNC: 8.7 MG/DL (ref 8.6–10.5)
CHLORIDE SERPL-SCNC: 106 MMOL/L (ref 98–107)
CHLORIDE SERPL-SCNC: 107 MMOL/L (ref 98–107)
CO2 SERPL-SCNC: 27.4 MMOL/L (ref 22–29)
CO2 SERPL-SCNC: 27.9 MMOL/L (ref 22–29)
CREAT SERPL-MCNC: 1.06 MG/DL (ref 0.57–1)
CREAT SERPL-MCNC: 1.1 MG/DL (ref 0.57–1)
DEPRECATED RDW RBC AUTO: 61.6 FL (ref 37–54)
DEPRECATED RDW RBC AUTO: 61.7 FL (ref 37–54)
EGFRCR SERPLBLD CKD-EPI 2021: 62.1 ML/MIN/1.73
EGFRCR SERPLBLD CKD-EPI 2021: 64.9 ML/MIN/1.73
EOSINOPHIL # BLD MANUAL: 0.42 10*3/MM3 (ref 0–0.4)
EOSINOPHIL NFR BLD MANUAL: 1 % (ref 0.3–6.2)
ERYTHROCYTE [DISTWIDTH] IN BLOOD BY AUTOMATED COUNT: 18 % (ref 12.3–15.4)
ERYTHROCYTE [DISTWIDTH] IN BLOOD BY AUTOMATED COUNT: 18.2 % (ref 12.3–15.4)
GLOBULIN UR ELPH-MCNC: 3.2 GM/DL
GLOBULIN UR ELPH-MCNC: 3.6 GM/DL
GLUCOSE BLDC GLUCOMTR-MCNC: 112 MG/DL (ref 70–105)
GLUCOSE BLDC GLUCOMTR-MCNC: 118 MG/DL (ref 70–105)
GLUCOSE BLDC GLUCOMTR-MCNC: 166 MG/DL (ref 70–105)
GLUCOSE BLDC GLUCOMTR-MCNC: 196 MG/DL (ref 70–105)
GLUCOSE SERPL-MCNC: 125 MG/DL (ref 65–99)
GLUCOSE SERPL-MCNC: 149 MG/DL (ref 65–99)
HCT VFR BLD AUTO: 23.5 % (ref 34–46.6)
HCT VFR BLD AUTO: 26.1 % (ref 34–46.6)
HGB BLD-MCNC: 7.1 G/DL (ref 12–15.9)
HGB BLD-MCNC: 7.7 G/DL (ref 12–15.9)
HYPOCHROMIA BLD QL: ABNORMAL
LARGE PLATELETS: ABNORMAL
LARGE PLATELETS: ABNORMAL
LDH SERPL-CCNC: 784 U/L (ref 135–214)
LYMPHOCYTES # BLD MANUAL: 6.95 10*3/MM3 (ref 0.7–3.1)
LYMPHOCYTES # BLD MANUAL: 9.25 10*3/MM3 (ref 0.7–3.1)
LYMPHOCYTES NFR BLD MANUAL: 1 % (ref 5–12)
LYMPHOCYTES NFR BLD MANUAL: 4 % (ref 5–12)
MCH RBC QN AUTO: 27.4 PG (ref 26.6–33)
MCH RBC QN AUTO: 28.2 PG (ref 26.6–33)
MCHC RBC AUTO-ENTMCNC: 29.5 G/DL (ref 31.5–35.7)
MCHC RBC AUTO-ENTMCNC: 30.2 G/DL (ref 31.5–35.7)
MCV RBC AUTO: 92.9 FL (ref 79–97)
MCV RBC AUTO: 93.3 FL (ref 79–97)
METAMYELOCYTES NFR BLD MANUAL: 4 % (ref 0–0)
METAMYELOCYTES NFR BLD MANUAL: 6 % (ref 0–0)
MONOCYTES # BLD: 0.42 10*3/MM3 (ref 0.1–0.9)
MONOCYTES # BLD: 1.85 10*3/MM3 (ref 0.1–0.9)
MYELOCYTES NFR BLD MANUAL: 10 % (ref 0–0)
MYELOCYTES NFR BLD MANUAL: 4 % (ref 0–0)
NEUTROPHILS # BLD AUTO: 2.52 10*3/MM3 (ref 1.7–7)
NEUTROPHILS # BLD AUTO: 4.17 10*3/MM3 (ref 1.7–7)
NEUTROPHILS NFR BLD MANUAL: 4 % (ref 42.7–76)
NEUTROPHILS NFR BLD MANUAL: 7 % (ref 42.7–76)
NEUTS BAND NFR BLD MANUAL: 2 % (ref 0–5)
NEUTS BAND NFR BLD MANUAL: 2 % (ref 0–5)
NRBC SPEC MANUAL: 2 /100 WBC (ref 0–0.2)
NRBC SPEC MANUAL: 2 /100 WBC (ref 0–0.2)
PLATELET # BLD AUTO: 136 10*3/MM3 (ref 140–450)
PLATELET # BLD AUTO: 139 10*3/MM3 (ref 140–450)
PMV BLD AUTO: 10 FL (ref 6–12)
PMV BLD AUTO: 9.7 FL (ref 6–12)
POIKILOCYTOSIS BLD QL SMEAR: ABNORMAL
POLYCHROMASIA BLD QL SMEAR: ABNORMAL
POTASSIUM SERPL-SCNC: 4.6 MMOL/L (ref 3.5–5.2)
POTASSIUM SERPL-SCNC: 4.9 MMOL/L (ref 3.5–5.2)
PROLYMPHOCYTES NFR BLD MANUAL: 2 % (ref 0–0)
PROMYELOCYTES NFR BLD MANUAL: 1 % (ref 0–0)
PROMYELOCYTES NFR BLD MANUAL: 2 % (ref 0–0)
PROT SERPL-MCNC: 6.8 G/DL (ref 6–8.5)
PROT SERPL-MCNC: 7.4 G/DL (ref 6–8.5)
RBC # BLD AUTO: 2.52 10*6/MM3 (ref 3.77–5.28)
RBC # BLD AUTO: 2.81 10*6/MM3 (ref 3.77–5.28)
SCAN SLIDE: NORMAL
SCAN SLIDE: NORMAL
SODIUM SERPL-SCNC: 142 MMOL/L (ref 136–145)
SODIUM SERPL-SCNC: 143 MMOL/L (ref 136–145)
STOMATOCYTES BLD QL SMEAR: ABNORMAL
TOXIC GRANULATION: ABNORMAL
URATE SERPL-MCNC: 11.1 MG/DL (ref 2.4–5.7)
URATE SERPL-MCNC: 6.7 MG/DL (ref 2.4–5.7)
VARIANT LYMPHS NFR BLD MANUAL: 14 % (ref 19.6–45.3)
VARIANT LYMPHS NFR BLD MANUAL: 15 % (ref 19.6–45.3)
VARIANT LYMPHS NFR BLD MANUAL: 8 % (ref 0–5)
WBC MORPH BLD: NORMAL
WBC NRBC COR # BLD AUTO: 42.04 10*3/MM3 (ref 3.4–10.8)
WBC NRBC COR # BLD AUTO: 46.36 10*3/MM3 (ref 3.4–10.8)

## 2024-10-06 PROCEDURE — 88184 FLOWCYTOMETRY/ TC 1 MARKER: CPT

## 2024-10-06 PROCEDURE — 84550 ASSAY OF BLOOD/URIC ACID: CPT | Performed by: STUDENT IN AN ORGANIZED HEALTH CARE EDUCATION/TRAINING PROGRAM

## 2024-10-06 PROCEDURE — 85007 BL SMEAR W/DIFF WBC COUNT: CPT | Performed by: STUDENT IN AN ORGANIZED HEALTH CARE EDUCATION/TRAINING PROGRAM

## 2024-10-06 PROCEDURE — 99232 SBSQ HOSP IP/OBS MODERATE 35: CPT | Performed by: STUDENT IN AN ORGANIZED HEALTH CARE EDUCATION/TRAINING PROGRAM

## 2024-10-06 PROCEDURE — 85025 COMPLETE CBC W/AUTO DIFF WBC: CPT | Performed by: STUDENT IN AN ORGANIZED HEALTH CARE EDUCATION/TRAINING PROGRAM

## 2024-10-06 PROCEDURE — 25010000002 FUROSEMIDE PER 20 MG: Performed by: INTERNAL MEDICINE

## 2024-10-06 PROCEDURE — 82948 REAGENT STRIP/BLOOD GLUCOSE: CPT | Performed by: STUDENT IN AN ORGANIZED HEALTH CARE EDUCATION/TRAINING PROGRAM

## 2024-10-06 PROCEDURE — 63710000001 INSULIN GLARGINE PER 5 UNITS: Performed by: STUDENT IN AN ORGANIZED HEALTH CARE EDUCATION/TRAINING PROGRAM

## 2024-10-06 PROCEDURE — 81207 BCR/ABL1 GENE MINOR BP: CPT

## 2024-10-06 PROCEDURE — 83615 LACTATE (LD) (LDH) ENZYME: CPT | Performed by: STUDENT IN AN ORGANIZED HEALTH CARE EDUCATION/TRAINING PROGRAM

## 2024-10-06 PROCEDURE — 63710000001 INSULIN LISPRO (HUMAN) PER 5 UNITS: Performed by: STUDENT IN AN ORGANIZED HEALTH CARE EDUCATION/TRAINING PROGRAM

## 2024-10-06 PROCEDURE — C1751 CATH, INF, PER/CENT/MIDLINE: HCPCS

## 2024-10-06 PROCEDURE — 25010000002 ONDANSETRON PER 1 MG: Performed by: NURSE PRACTITIONER

## 2024-10-06 PROCEDURE — 88185 FLOWCYTOMETRY/TC ADD-ON: CPT | Performed by: PHYSICIAN ASSISTANT

## 2024-10-06 PROCEDURE — 99232 SBSQ HOSP IP/OBS MODERATE 35: CPT | Performed by: INTERNAL MEDICINE

## 2024-10-06 PROCEDURE — 81206 BCR/ABL1 GENE MAJOR BP: CPT

## 2024-10-06 PROCEDURE — 82948 REAGENT STRIP/BLOOD GLUCOSE: CPT

## 2024-10-06 PROCEDURE — 80053 COMPREHEN METABOLIC PANEL: CPT | Performed by: STUDENT IN AN ORGANIZED HEALTH CARE EDUCATION/TRAINING PROGRAM

## 2024-10-06 RX ORDER — ALLOPURINOL 100 MG/1
200 TABLET ORAL EVERY 8 HOURS
Status: DISCONTINUED | OUTPATIENT
Start: 2024-10-06 | End: 2024-10-10 | Stop reason: HOSPADM

## 2024-10-06 RX ADMIN — MIRTAZAPINE 15 MG: 15 TABLET, FILM COATED ORAL at 20:25

## 2024-10-06 RX ADMIN — GABAPENTIN 800 MG: 400 CAPSULE ORAL at 13:36

## 2024-10-06 RX ADMIN — INSULIN GLARGINE 28 UNITS: 100 INJECTION, SOLUTION SUBCUTANEOUS at 20:24

## 2024-10-06 RX ADMIN — HYDROXYUREA 1000 MG: 500 CAPSULE ORAL at 11:02

## 2024-10-06 RX ADMIN — FUROSEMIDE 40 MG: 10 INJECTION, SOLUTION INTRAMUSCULAR; INTRAVENOUS at 18:16

## 2024-10-06 RX ADMIN — METOPROLOL SUCCINATE 50 MG: 50 TABLET, EXTENDED RELEASE ORAL at 09:08

## 2024-10-06 RX ADMIN — METOPROLOL SUCCINATE 50 MG: 50 TABLET, EXTENDED RELEASE ORAL at 20:25

## 2024-10-06 RX ADMIN — AMOXICILLIN AND CLAVULANATE POTASSIUM 1 TABLET: 875; 125 TABLET, FILM COATED ORAL at 20:25

## 2024-10-06 RX ADMIN — INSULIN LISPRO 2 UNITS: 100 INJECTION, SOLUTION INTRAVENOUS; SUBCUTANEOUS at 18:16

## 2024-10-06 RX ADMIN — INSULIN LISPRO 7 UNITS: 100 INJECTION, SOLUTION INTRAVENOUS; SUBCUTANEOUS at 18:15

## 2024-10-06 RX ADMIN — ACETAMINOPHEN 650 MG: 650 SOLUTION ORAL at 18:13

## 2024-10-06 RX ADMIN — TIZANIDINE 4 MG: 4 TABLET ORAL at 09:08

## 2024-10-06 RX ADMIN — AMOXICILLIN AND CLAVULANATE POTASSIUM 1 TABLET: 875; 125 TABLET, FILM COATED ORAL at 09:08

## 2024-10-06 RX ADMIN — LOSARTAN POTASSIUM 12.5 MG: 25 TABLET, FILM COATED ORAL at 09:08

## 2024-10-06 RX ADMIN — ALLOPURINOL 200 MG: 100 TABLET ORAL at 09:08

## 2024-10-06 RX ADMIN — ONDANSETRON 4 MG: 2 INJECTION, SOLUTION INTRAMUSCULAR; INTRAVENOUS at 07:22

## 2024-10-06 RX ADMIN — INSULIN LISPRO 7 UNITS: 100 INJECTION, SOLUTION INTRAVENOUS; SUBCUTANEOUS at 13:37

## 2024-10-06 RX ADMIN — GABAPENTIN 800 MG: 400 CAPSULE ORAL at 06:12

## 2024-10-06 RX ADMIN — ALLOPURINOL 200 MG: 100 TABLET ORAL at 18:14

## 2024-10-06 RX ADMIN — INSULIN LISPRO 2 UNITS: 100 INJECTION, SOLUTION INTRAVENOUS; SUBCUTANEOUS at 20:24

## 2024-10-06 RX ADMIN — HYDROXYUREA 1000 MG: 500 CAPSULE ORAL at 20:25

## 2024-10-06 RX ADMIN — Medication 10 ML: at 20:29

## 2024-10-06 RX ADMIN — Medication 10 ML: at 09:09

## 2024-10-06 RX ADMIN — GABAPENTIN 800 MG: 400 CAPSULE ORAL at 20:25

## 2024-10-06 RX ADMIN — FUROSEMIDE 40 MG: 10 INJECTION, SOLUTION INTRAMUSCULAR; INTRAVENOUS at 06:12

## 2024-10-06 NOTE — PLAN OF CARE
Goal Outcome Evaluation:           Problem: Adult Inpatient Plan of Care  Goal: Plan of Care Review  Outcome: Progressing  Goal: Patient-Specific Goal (Individualized)  Outcome: Progressing  Goal: Absence of Hospital-Acquired Illness or Injury  Outcome: Progressing  Intervention: Identify and Manage Fall Risk  Recent Flowsheet Documentation  Taken 10/6/2024 0406 by Ann Rose RN  Safety Promotion/Fall Prevention:   nonskid shoes/slippers when out of bed   safety round/check completed   assistive device/personal items within reach   clutter free environment maintained   lighting adjusted   room organization consistent   toileting scheduled  Taken 10/6/2024 0206 by Ann Rose RN  Safety Promotion/Fall Prevention:   nonskid shoes/slippers when out of bed   safety round/check completed   assistive device/personal items within reach   clutter free environment maintained   lighting adjusted   room organization consistent   toileting scheduled  Taken 10/6/2024 0019 by Ann Rose RN  Safety Promotion/Fall Prevention:   nonskid shoes/slippers when out of bed   safety round/check completed   assistive device/personal items within reach   clutter free environment maintained   lighting adjusted   room organization consistent   toileting scheduled  Taken 10/5/2024 2200 by Ann Rose RN  Safety Promotion/Fall Prevention:   nonskid shoes/slippers when out of bed   safety round/check completed   assistive device/personal items within reach   clutter free environment maintained   lighting adjusted   room organization consistent   toileting scheduled  Taken 10/5/2024 2022 by Ann Rose RN  Safety Promotion/Fall Prevention:   nonskid shoes/slippers when out of bed   safety round/check completed   assistive device/personal items within reach   clutter free environment maintained   lighting adjusted   room organization consistent   toileting scheduled  Goal: Optimal Comfort and Wellbeing  Outcome:  Progressing  Intervention: Monitor Pain and Promote Comfort  Recent Flowsheet Documentation  Taken 10/5/2024 2125 by Ann Rose RN  Pain Management Interventions: see MAR  Goal: Readiness for Transition of Care  Outcome: Progressing     Problem: Diabetes Comorbidity  Goal: Blood Glucose Level Within Targeted Range  Outcome: Progressing     Problem: Heart Failure Comorbidity  Goal: Maintenance of Heart Failure Symptom Control  Outcome: Progressing     Problem: Pain Chronic (Persistent) (Comorbidity Management)  Goal: Acceptable Pain Control and Functional Ability  Outcome: Progressing  Intervention: Develop Pain Management Plan  Recent Flowsheet Documentation  Taken 10/5/2024 2125 by Ann Rose RN  Pain Management Interventions: see MAR     Problem: Skin Injury Risk Increased  Goal: Skin Health and Integrity  Outcome: Progressing  Intervention: Optimize Skin Protection  Recent Flowsheet Documentation  Taken 10/6/2024 0406 by Ann Rose RN  Head of Bed (HOB) Positioning: HOB elevated  Taken 10/6/2024 0206 by Ann Rose RN  Head of Bed (HOB) Positioning: HOB elevated  Taken 10/6/2024 0019 by Ann Rose RN  Head of Bed (HOB) Positioning: HOB elevated  Taken 10/5/2024 2200 by Ann Rose RN  Head of Bed (HOB) Positioning: HOB elevated  Taken 10/5/2024 2022 by Ann Rose RN  Head of Bed (HOB) Positioning: HOB elevated     Problem: Fall Injury Risk  Goal: Absence of Fall and Fall-Related Injury  Outcome: Progressing  Intervention: Promote Injury-Free Environment  Recent Flowsheet Documentation  Taken 10/6/2024 0406 by Ann Rose RN  Safety Promotion/Fall Prevention:   nonskid shoes/slippers when out of bed   safety round/check completed   assistive device/personal items within reach   clutter free environment maintained   lighting adjusted   room organization consistent   toileting scheduled  Taken 10/6/2024 0206 by Ann Rose RN  Safety Promotion/Fall Prevention:   nonskid shoes/slippers when out  of bed   safety round/check completed   assistive device/personal items within reach   clutter free environment maintained   lighting adjusted   room organization consistent   toileting scheduled  Taken 10/6/2024 0019 by Ann Rose RN  Safety Promotion/Fall Prevention:   nonskid shoes/slippers when out of bed   safety round/check completed   assistive device/personal items within reach   clutter free environment maintained   lighting adjusted   room organization consistent   toileting scheduled  Taken 10/5/2024 2200 by Ann Rose RN  Safety Promotion/Fall Prevention:   nonskid shoes/slippers when out of bed   safety round/check completed   assistive device/personal items within reach   clutter free environment maintained   lighting adjusted   room organization consistent   toileting scheduled  Taken 10/5/2024 2022 by Ann Rose RN  Safety Promotion/Fall Prevention:   nonskid shoes/slippers when out of bed   safety round/check completed   assistive device/personal items within reach   clutter free environment maintained   lighting adjusted   room organization consistent   toileting scheduled     Problem: Adjustment to Illness (Sepsis/Septic Shock)  Goal: Optimal Coping  Outcome: Progressing     Problem: Bleeding (Sepsis/Septic Shock)  Goal: Absence of Bleeding  Outcome: Progressing     Problem: Glycemic Control Impaired (Sepsis/Septic Shock)  Goal: Blood Glucose Level Within Desired Range  Outcome: Progressing     Problem: Infection Progression (Sepsis/Septic Shock)  Goal: Absence of Infection Signs and Symptoms  Outcome: Progressing     Problem: Nutrition Impaired (Sepsis/Septic Shock)  Goal: Optimal Nutrition Intake  Outcome: Progressing

## 2024-10-06 NOTE — PROGRESS NOTES
Daily Progress Note        Acute hypoxemic respiratory failure    CML (chronic myelocytic leukemia)    History of pulmonary embolism    Medically noncompliant    Type 2 diabetes mellitus with diabetic polyneuropathy, with long-term current use of insulin    Sinus tachycardia    NICM (nonischemic cardiomyopathy)    Anemia due to chemotherapy    Acute on chronic HFrEF (heart failure with reduced ejection fraction)    Assessment:        Hypoxic respiratory insufficiency  Streptococcus, alphahemolytic bacteremia on 9/27/2024     Diffuse bilateral groundglass opacities and alveolar infiltrates    Significant leukocytosis with 45% blasts  History of CML  Afebrile    Chronic elevated right hemidiaphragm  Hypertension     Expanded respiratory panel and Legionella and strep urine antigen negative        2D echo 10-24    Left ventricular systolic function is normal. Left ventricular ejection fraction appears to be 61 - 65%.    Left ventricular wall thickness is consistent with borderline concentric hypertrophy.    Left ventricular diastolic function was normal.    The right ventricular cavity is borderline dilated.    Estimated right ventricular systolic pressure from tricuspid regurgitation is mildly elevated (35-45 mmHg).    Mild pulmonary hypertension is present.        Recommendations:     ANCA panel  Extract nuclear panel  JOSE  ESR    Augmentin  Blood cultures  Sputum cultures     Oxygen titration currently on 4 L    Gynecology following      Diuresis Lasix 20 mg po daily                    LOS: 9 days     Subjective         Objective     Vital signs for last 24 hours:  Vitals:    10/06/24 0014 10/06/24 0432 10/06/24 0824 10/06/24 0908   BP: 101/66 106/68 140/88    BP Location: Left arm Left arm Right arm    Patient Position: Lying Lying Sitting    Pulse: 95 92 92 97   Resp: 16 12 16    Temp: 97.7 °F (36.5 °C)  98 °F (36.7 °C)    TempSrc: Axillary  Oral    SpO2: 93% 93% 92%    Weight:       Height:            Intake/Output last 3 shifts:  I/O last 3 completed shifts:  In: 960 [P.O.:960]  Out: -   Intake/Output this shift:  No intake/output data recorded.      Radiology  Imaging Results (Last 24 Hours)       ** No results found for the last 24 hours. **            Labs:  Results from last 7 days   Lab Units 10/05/24  2339   WBC 10*3/mm3 46.36*   HEMOGLOBIN g/dL 7.1*   HEMATOCRIT % 23.5*   PLATELETS 10*3/mm3 139*     Results from last 7 days   Lab Units 10/05/24  2339   SODIUM mmol/L 142   POTASSIUM mmol/L 4.6   CHLORIDE mmol/L 107   CO2 mmol/L 27.4   BUN mg/dL 41*   CREATININE mg/dL 1.10*   CALCIUM mg/dL 8.6   BILIRUBIN mg/dL 0.3   ALK PHOS U/L 311*   ALT (SGPT) U/L 12   AST (SGOT) U/L 15   GLUCOSE mg/dL 149*         Results from last 7 days   Lab Units 10/05/24  2339 10/05/24  1329 10/05/24  0518   ALBUMIN g/dL 3.6 3.7 3.5             Results from last 7 days   Lab Units 10/05/24  0518   MAGNESIUM mg/dL 1.8     Results from last 7 days   Lab Units 10/05/24  1538   INR  1.10   APTT seconds 24.5               Meds:   SCHEDULE  acetaminophen, 650 mg, Oral, Once   Or  acetaminophen, 650 mg, Oral, Once   Or  acetaminophen, 650 mg, Rectal, Once  allopurinol, 200 mg, Oral, Q8H  amoxicillin-clavulanate, 1 tablet, Oral, Q12H  furosemide, 40 mg, Intravenous, BID  gabapentin, 800 mg, Oral, Q8H  hydroxyurea, 1,000 mg, Oral, BID  insulin glargine, 28 Units, Subcutaneous, Nightly  insulin lispro, 2-7 Units, Subcutaneous, 4x Daily AC & at Bedtime  insulin lispro, 7 Units, Subcutaneous, TID With Meals  losartan, 12.5 mg, Oral, Q24H  metoprolol succinate XL, 50 mg, Oral, Q12H  mirtazapine, 15 mg, Oral, Nightly  [Held by provider] rivaroxaban, 10 mg, Oral, Daily  sodium chloride, 10 mL, Intravenous, Q12H  tiZANidine, 4 mg, Oral, Daily      Infusions  pain,       PRNs    acetaminophen **OR** acetaminophen **OR** acetaminophen    albuterol    ALPRAZolam    senna-docusate sodium **AND** polyethylene glycol **AND** bisacodyl **AND**  bisacodyl    Calcium Replacement - Follow Nurse / BPA Driven Protocol    dextrose    dextrose    Diclofenac Sodium    glucagon (human recombinant)    hydrALAZINE    Magnesium Standard Dose Replacement - Follow Nurse / BPA Driven Protocol    ondansetron    Phosphorus Replacement - Follow Nurse / BPA Driven Protocol    Potassium Replacement - Follow Nurse / BPA Driven Protocol    [COMPLETED] Insert Peripheral IV **AND** sodium chloride    sodium chloride    sodium chloride    Physical Exam:  Physical Exam  Cardiovascular:      Heart sounds: No murmur heard.     No gallop.   Pulmonary:      Effort: No respiratory distress.      Breath sounds: No stridor. Rhonchi and rales present. No wheezing.   Chest:      Chest wall: No tenderness.         ROS  Review of Systems   Respiratory:  Positive for cough and shortness of breath. Negative for wheezing and stridor.    Cardiovascular:  Positive for palpitations and leg swelling. Negative for chest pain.             Total time spent with patient greater than: 45 Minutes

## 2024-10-06 NOTE — PROGRESS NOTES
Hematology/Oncology Inpatient Progress Note    PATIENT NAME: Nieves Mc  : 1975  MRN: [id number]    CHIEF COMPLAINT: Hypoxia    HISTORY OF PRESENT ILLNESS:    Nieves Mc is a 48 y.o. female who presented to [facility] on 2024 with complaints of shortness of breath.  She was found to hypoxic with SpO2 76% while undergoing evaluation for blood transfusion.  She was subsequently sent to the ED for further evaluation.  She does report bilateral leg swelling.  In the ED she was saturating 86% and requiring 3 L nasal cannula to maintain PaO2 over 90%.  She was mildly tachycardic, hypertensive and afebrile.  Chest x-ray completed showed patchy right greater than left airspace opacities.  CBC was notable for neutropenia, anemia.  She was started on IV antibiotics.  Patient is now needing 5 L oxygen.  Cardiology and pulmonology have been consulted.  It appears she has been noncompliant with her cardiac medications and her cardiology outpatient follow-up.  Interval chest x-ray on 10/1/2024 showed stable patchy multifocal airspace infiltrates interstitial changes throughout the lungs bilaterally.  She had duplex ultrasound bilateral lower extremities due to swelling and this was normal.      10/04/24  Hematology/Oncology was consulted.  She is established with Dr. Lerma for CML.  She is currently on treatment with ponatinib, but most recent office visit with Dr. Lerma she had stated she has been noncompliant over the last 3 to 4 weeks.  She was encouraged at that visit to restart her treatment.    10/5/24: Pt seen today for follow up. Reported that her shortness of breath has improved. However continues to be hypoxic off Oxygen support. She is currently on IV antibiotics.    Subjective   10/6/24: pt seen today, appears to be clinically stable. Respiratory status has improved.     ROS:  Review of Systems   Constitutional:  Positive for fatigue.   HENT: Negative.     Eyes: Negative.     Respiratory:  Positive for shortness of breath.    Cardiovascular: Negative.    Gastrointestinal: Negative.    Endocrine: Negative.    Genitourinary: Negative.    Musculoskeletal: Negative.    Skin: Negative.    Allergic/Immunologic: Negative.    Neurological: Negative.    Hematological: Negative.    Psychiatric/Behavioral: Negative.          MEDICATIONS:    Scheduled Meds:  acetaminophen, 650 mg, Oral, Once   Or  acetaminophen, 650 mg, Oral, Once   Or  acetaminophen, 650 mg, Rectal, Once  allopurinol, 100 mg, Oral, Q8H  amoxicillin-clavulanate, 1 tablet, Oral, Q12H  furosemide, 40 mg, Intravenous, BID  gabapentin, 800 mg, Oral, Q8H  hydroxyurea, 1,000 mg, Oral, BID  insulin glargine, 28 Units, Subcutaneous, Nightly  insulin lispro, 2-7 Units, Subcutaneous, 4x Daily AC & at Bedtime  insulin lispro, 7 Units, Subcutaneous, TID With Meals  losartan, 12.5 mg, Oral, Q24H  metoprolol succinate XL, 50 mg, Oral, Q12H  mirtazapine, 15 mg, Oral, Nightly  [Held by provider] rivaroxaban, 10 mg, Oral, Daily  sodium chloride, 10 mL, Intravenous, Q12H  tiZANidine, 4 mg, Oral, Daily       Continuous Infusions:  pain,        PRN Meds:    acetaminophen **OR** acetaminophen **OR** acetaminophen    albuterol    ALPRAZolam    senna-docusate sodium **AND** polyethylene glycol **AND** bisacodyl **AND** bisacodyl    Calcium Replacement - Follow Nurse / BPA Driven Protocol    dextrose    dextrose    Diclofenac Sodium    glucagon (human recombinant)    hydrALAZINE    Magnesium Standard Dose Replacement - Follow Nurse / BPA Driven Protocol    ondansetron    Phosphorus Replacement - Follow Nurse / BPA Driven Protocol    Potassium Replacement - Follow Nurse / BPA Driven Protocol    [COMPLETED] Insert Peripheral IV **AND** sodium chloride    sodium chloride    sodium chloride     ALLERGIES:    Allergies   Allergen Reactions    Contrast Dye (Echo Or Unknown Ct/Mr) Shortness Of Breath and Nausea And Vomiting     Flushed        Objective   "  VITALS:   /68 (BP Location: Left arm, Patient Position: Lying)   Pulse 92   Temp 97.7 °F (36.5 °C) (Axillary)   Resp 12   Ht 162.6 cm (64\")   Wt 95.7 kg (211 lb)   LMP 05/25/2022 (Approximate)   SpO2 93%   BMI 36.22 kg/m²     PHYSICAL EXAM: (performed by MD)  Physical Exam  Constitutional:       Appearance: She is normal weight. She is ill-appearing.   HENT:      Head: Normocephalic and atraumatic.      Right Ear: External ear normal.      Left Ear: External ear normal.      Nose: Nose normal.      Mouth/Throat:      Mouth: Mucous membranes are moist.      Pharynx: Oropharynx is clear.   Eyes:      Extraocular Movements: Extraocular movements intact.      Conjunctiva/sclera: Conjunctivae normal.      Pupils: Pupils are equal, round, and reactive to light.   Cardiovascular:      Rate and Rhythm: Normal rate.      Pulses: Normal pulses.   Pulmonary:      Effort: Pulmonary effort is normal.   Abdominal:      General: Abdomen is flat.      Palpations: Abdomen is soft.   Musculoskeletal:         General: Normal range of motion.      Cervical back: Normal range of motion and neck supple.   Skin:     General: Skin is warm.   Neurological:      Mental Status: She is alert.   Psychiatric:         Mood and Affect: Mood normal.         Behavior: Behavior normal.         Thought Content: Thought content normal.         Judgment: Judgment normal.           RECENT LABS:  Lab Results (last 24 hours)       Procedure Component Value Units Date/Time    CBC & Differential [[id number]]  (Abnormal) Collected: 10/05/24 2339    Specimen: Blood Updated: 10/06/24 0153    Narrative:      The following orders were created for panel order CBC & Differential.  Procedure                               Abnormality         Status                     ---------                               -----------         ------                     CBC Auto Differential[[id number]]        Abnormal            Final result               Scan " Slide[[id number]]                                       Final result                 Please view results for these tests on the individual orders.    Scan Slide [[id number]] Collected: 10/05/24 2339    Specimen: Blood Updated: 10/06/24 0153     Scan Slide --     Comment: See Manual Differential Results       Manual Differential [[id number]]  (Abnormal) Collected: 10/05/24 2339    Specimen: Blood Updated: 10/06/24 0153     Neutrophil % 7.0 %      Lymphocyte % 15.0 %      Monocyte % 4.0 %      Basophil % 3.0 %      Bands %  2.0 %      Metamyelocyte % 4.0 %      Myelocyte % 10.0 %      Promyelocyte % 2.0 %      Blasts % 53.0 %      Neutrophils Absolute 4.17 10*3/mm3      Lymphocytes Absolute 6.95 10*3/mm3      Monocytes Absolute 1.85 10*3/mm3      Basophils Absolute 1.39 10*3/mm3      nRBC 2.0 /100 WBC      Anisocytosis Slight/1+     WBC Morphology Normal     Large Platelets Slight/1+    Narrative:      Reviewed by Pathologist within the past 30 days on [id number].      CBC Auto Differential [[id number]]  (Abnormal) Collected: 10/05/24 2339    Specimen: Blood Updated: 10/06/24 0153     WBC 46.36 10*3/mm3      RBC 2.52 10*6/mm3      Hemoglobin 7.1 g/dL      Hematocrit 23.5 %      MCV 93.3 fL      MCH 28.2 pg      MCHC 30.2 g/dL      RDW 18.0 %      RDW-SD 61.6 fl      MPV 10.0 fL      Platelets 139 10*3/mm3     Narrative:      The previously reported component NRBC is no longer being reported. Previous result was 0.6 /100 WBC (Reference Range: 0.0-0.2 /100 WBC) on 10/6/2024 at 0000 EDT.    Comprehensive Metabolic Panel [[id number]]  (Abnormal) Collected: 10/05/24 2339    Specimen: Blood Updated: 10/06/24 0011     Glucose 149 mg/dL      BUN 41 mg/dL      Creatinine 1.10 mg/dL      Sodium 142 mmol/L      Potassium 4.6 mmol/L      Chloride 107 mmol/L      CO2 27.4 mmol/L      Calcium 8.6 mg/dL      Total Protein 6.8 g/dL      Albumin 3.6 g/dL      ALT (SGPT) 12 U/L      AST (SGOT) 15 U/L      Alkaline Phosphatase 311 U/L      Total  Bilirubin 0.3 mg/dL      Globulin 3.2 gm/dL      A/G Ratio 1.1 g/dL      BUN/Creatinine Ratio 37.3     Anion Gap 7.6 mmol/L      eGFR 62.1 mL/min/1.73     Narrative:      GFR Normal >60  Chronic Kidney Disease <60  Kidney Failure <15      Uric Acid [[id number]]  (Abnormal) Collected: 10/05/24 2339    Specimen: Blood Updated: 10/06/24 0010     Uric Acid 11.1 mg/dL     POC Glucose 4x Daily Before Meals & at Bedtime [[id number]]  (Abnormal) Collected: 10/05/24 2223    Specimen: Blood Updated: 10/05/24 2224     Glucose 130 mg/dL      Comment: Serial Number: [id number]:  [id number]       POC Glucose 4x Daily Before Meals & at Bedtime [[id number]]  (Abnormal) Collected: 10/05/24 1709    Specimen: Blood Updated: 10/05/24 1711     Glucose 205 mg/dL      Comment: Serial Number: [id number]:  [id number]       Fibrinogen [[id number]]  (Abnormal) Collected: 10/05/24 1538    Specimen: Blood from Arm, Right Updated: 10/05/24 1622     Fibrinogen 706 mg/dL     Protime-INR [[id number]]  (Abnormal) Collected: 10/05/24 1538    Specimen: Blood from Arm, Right Updated: 10/05/24 1622     Protime 11.9 Seconds      INR 1.10    aPTT [[id number]]  (Normal) Collected: 10/05/24 1538    Specimen: Blood from Arm, Right Updated: 10/05/24 1622     PTT 24.5 seconds     Comprehensive Metabolic Panel [[id number]]  (Abnormal) Collected: 10/05/24 1329    Specimen: Blood from Arm, Right Updated: 10/05/24 1406     Glucose 130 mg/dL      BUN 44 mg/dL      Creatinine 1.31 mg/dL      Sodium 142 mmol/L      Potassium 5.3 mmol/L      Chloride 106 mmol/L      CO2 23.1 mmol/L      Calcium 9.1 mg/dL      Total Protein 7.8 g/dL      Albumin 3.7 g/dL      ALT (SGPT) 12 U/L      AST (SGOT) 19 U/L      Alkaline Phosphatase 362 U/L      Total Bilirubin 0.3 mg/dL      Globulin 4.1 gm/dL      A/G Ratio 0.9 g/dL      BUN/Creatinine Ratio 33.6     Anion Gap 12.9 mmol/L      eGFR 50.4 mL/min/1.73     Narrative:      GFR Normal >60  Chronic Kidney Disease <60  Kidney  Failure <15      Sedimentation Rate [[id number]]  (Abnormal) Collected: 10/05/24 1329    Specimen: Blood from Arm, Right Updated: 10/05/24 1346     Sed Rate 45 mm/hr     Extractable Nuclear Antigen Antibodies [[id number]] Collected: 10/05/24 1329    Specimen: Blood from Arm, Right Updated: 10/05/24 1342    ANCA Panel [[id number]] Collected: 10/05/24 1329    Specimen: Blood from Arm, Right Updated: 10/05/24 1342    JOSE by IFA, Reflex 9-biomarkers profile [[id number]] Collected: 10/05/24 1329    Specimen: Blood from Arm, Right Updated: 10/05/24 1342    Urine Culture - Urine, Urine, Clean Catch [[id number]]  (Normal) Collected: 10/04/24 0934    Specimen: Urine, Clean Catch Updated: 10/05/24 1325     Urine Culture No growth    Blood Culture - Blood, Hand, Left [[id number]]  (Normal) Collected: 10/04/24 1111    Specimen: Blood from Hand, Left Updated: 10/05/24 1130     Blood Culture No growth at 24 hours    Blood Culture - Blood, Hand, Right [[id number]]  (Normal) Collected: 10/04/24 1111    Specimen: Blood from Hand, Right Updated: 10/05/24 1130     Blood Culture No growth at 24 hours    POC Glucose 4x Daily Before Meals & at Bedtime [[id number]]  (Abnormal) Collected: 10/05/24 1107    Specimen: Blood Updated: 10/05/24 1109     Glucose 214 mg/dL      Comment: Serial Number: [id number]:  [id number]       Uric Acid [[id number]]  (Abnormal) Collected: 10/05/24 0518    Specimen: Blood Updated: 10/05/24 1041     Uric Acid 15.2 mg/dL     Phosphorus [[id number]]  (Normal) Collected: 10/05/24 0518    Specimen: Blood Updated: 10/05/24 1041     Phosphorus 3.9 mg/dL     Lactate Dehydrogenase [[id number]]  (Abnormal) Collected: 10/05/24 0518    Specimen: Blood Updated: 10/05/24 1041      U/L     Magnesium [[id number]]  (Normal) Collected: 10/05/24 0518    Specimen: Blood Updated: 10/05/24 1041     Magnesium 1.8 mg/dL     Peripheral Blood Smear [[id number]] Collected: 10/05/24 0518    Specimen: Blood Updated: 10/05/24 1037      Pathology Review Yes            PENDING RESULTS:     IMAGING REVIEWED:  CT Chest Without Contrast Diagnostic    Result Date: 10/4/2024  1.Evidence for recurrent or progressing atypical/viral infection versus multifocal pneumonia. Other chronic inflammatory conditions with waxing and waning presentation could potentially be considered such as sarcoidosis. Recommend correlation for signs or symptoms of acute infection and follow-up to ensure improvement/resolution. 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   -As per chart review, She has not been compliant and was encouraged to restart at last visit 9/26/2024.  Although patient disputed this and stated that she has been compliant except for last 1-2 weeks.  -Ponatinib on hold for now.   -labs reviewed, Noted to have uptrending Blast count and immature forms, 45% blasts noted.  -Coag parameters normal. S. Mg and P levels WNL. Renal function is stable.  -Fibrinogen levels are sent, pending. Flow Cytometry is pending.  -overall picture is concerning for transformation to blast crisis given >30% blasts in peripheral circulation.  -Will plan to start her on Rasburicase 6mg Daily,  -She has low Hb 7.3 and Plt count 157, Will start at Hydrea 1g BID and monitor CBC closely.   -discussed with [facility] BMT service for potential transfer of the patient to BMT service, she was not considered a candidate for transfer at this time due to underlying multiple medical comorbidities and while awaiting further workup.  -Will plan for repeat Bone marrow biopsy tomorrow to further evaluate. This was discussed with pt in detail.    3. Hyperuricemia:  S. Uric acid 15.2 yesterday. started Rasburicase and allopurinol  Repeat Uric acid down to  6.7.   Continue Allopurinol at 200mg TID    4 ACute on chronic anemia.  Multifactorial    5. Acute hypoxic respiratory insufficiency.  Likely secondary to congestive heart failure, pneumonia.    Cardiology and pulmonology are following.  Currently on 5 L of oxygen nasal cannula and diuresis with Lasix.  -discussed with pulmonology, Recommended against IV steroids given ongoing leukocytosis,         Austin Vaughan MD 10/06/24

## 2024-10-06 NOTE — PROGRESS NOTES
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 diastolic function with mild pulmonary hypertension  Technically difficult study.  IVC is 2.4 cm  Patient has been noncompliant with medications and outpatient follow up.  High-sensitivity troponin of 17 and 22, proBNP of 4000  Previously proBNP was 12,000  Currently on IV diuretics  Monitor I's and O's and replace electrolytes as needed  Continue losartan, Toprol-XL  Resume Jardiance  Emphasized importance of compliance with medications and follow-up  Low-salt diet discussed with the patient  Blood pressure and heart rate are stable on Toprol and losartan     Acute respiratory failure with hypoxia  Likely multifactorial, secondary to pneumonia, CHF, and anemia   Completed antibiotics  Remains on 5 L of oxygen  Wean as tolerated  Patient is seen by pulmonologist and has Streptococcus bacteremia and is currently on oxygen Augmentin     Anemia due to chemotherapy  Patient Xarelto is still on hold and will have oncologist decide when to resume again.     CML (chronic myelocytic leukemia)  On chemotherapy, followed by oncology   White count is increasing and hence oncology started on additional medicines and called us for probable tumor lysis syndrome and the need for IV  fluids  Discussed with him about careful hydration because of her low EF in the past but now since her EF is better she can have IV fluids     Medically noncompliant  Compliance encouraged  / consulted   HF Nurse Navigator consulted as above     Type 2 diabetes mellitus with diabetic polyneuropathy, with long-term current use of insulin  A1c of 8.2  On Lantus and SSI     Sinus tachycardia  Due to underlying infection, anemia and hypoxia  Expected to improve with transfusion  Will need additional diuretics with blood transfusion today  Treat underlying causes  Continue Toprol-XL 50 mg twice daily  Heart rate is better      Jose Sahrma MD  10/06/24  06:35 EDT

## 2024-10-06 NOTE — PROGRESS NOTES
[facility] MEDICINE SERVICE  DAILY PROGRESS NOTE    NAME: Nieves Mc  : 1975  MRN: [id number]      LOS: 9 days     PROVIDER OF SERVICE: Jayy Boggs MD    Chief Complaint: Acute hypoxemic respiratory failure    Subjective:     Interval History:  History taken from: patient      History of Present Illness: Nieves Mc is a 48 y.o. female with a PMH of CML on ponatinib, chronic anemia, leukopenia on bilateral subdural hematoma, systolic CHF, Diabetes type 1, PE on xarelto, anxiety who presented to [facility] on 2024 with shhypoxia. Patient was evaluated earlier at cancer center for a blood transfusion, was then found to be hypoxic SPO2 76 %. Patient was subsequently sent to ED for evaluation. Denies chest pain, fever chills, hemoptysis. She also denies SOB. Reports bilateral LE swelling.  Evaluation in ED patient saturating 86 %, requiring 3 L NC to maintain SPO2 > 90 %. Tachycardic in mid 110s, afebrile, slightly hypertensive. CXR done showed patchy right greater than left airspace opacities. CBC labs notable for neutropenia, Hb 7.7. Chemistry labs T bili 1.8, creat 1.29, BUN 34. HS trop marginally elevated 17, proBNP 4.257. The decision to admit was made.      24 seen in bd NAD, C/O dyspnea, on 5lts oxygen  24 still c/o dyspnea on 5 lts, bp stable, will likely need Oxygen on dc. GIRMA RN  24 patient seen and examined in bed no acute distress, heart rate 133, dyspnea on 5 L, hemoglobin 7, will transfuse 1 units of packed RBC, discussed with RN.  10/1: received 1PRBC  10/2: Hb stable post transfusion, denies any new complaints   10/3: WBC is trending up, septic w/u ordered, pulmonary and Oncology to see the patient today  10/5/024 patient seen and examined in bed no acute distress patient is complaining of weakness fatigue shortness of breath.  Presently only on 4 L.  Saturation 88 to 89%.  Patient heart rate is 115.  Discussed with RN.  Patient wants to go home on  discharge.    Review of Systems  Constitutional:  Positive for fatigue. Negative for fever.   HENT:  Positive for congestion.    Respiratory:  Positive for cough and shortness of breath. Negative for chest tightness.    Cardiovascular:  Negative for chest pain.   Gastrointestinal:  Negative for abdominal pain.   Genitourinary:  Negative for dysuria.   Psychiatric/Behavioral:  Negative for confusion.    Objective:     Vital Signs  Temp:  [97.7 °F (36.5 °C)-98 °F (36.7 °C)] 98 °F (36.7 °C)  Heart Rate:  [] 97  Resp:  [12-18] 16  BP: (101-140)/(66-88) 140/88  Flow (L/min):  [4-5] 5   Body mass index is 36.22 kg/m².    Physical Exam  General: Alert and oriented, no acute distress. Pale looking  HENT: Normocephalic, moist oral mucosa, no scleral icterus.  Neck: Supple, nontender, no carotid bruits, no JVD, no LAD.  Lungs: bilateral rales   Heart: RRR, no murmur, gallop or edema.  Abdomen: Soft, nontender, nondistended, + bowel sounds.  Musculoskeletal: pedal edema   Neuro: alert and awake, moving all 4 extremities   Skin: Skin is warm, dry and pink, no rashes or lesions.  Psychiatric: Cooperative, appropriate mood and affect.         Diagnostic Data    Results from last 7 days   Lab Units 10/05/24  2339   WBC 10*3/mm3 46.36*   HEMOGLOBIN g/dL 7.1*   HEMATOCRIT % 23.5*   PLATELETS 10*3/mm3 139*   GLUCOSE mg/dL 149*   CREATININE mg/dL 1.10*   BUN mg/dL 41*   SODIUM mmol/L 142   POTASSIUM mmol/L 4.6   AST (SGOT) U/L 15   ALT (SGPT) U/L 12   ALK PHOS U/L 311*   BILIRUBIN mg/dL 0.3   ANION GAP mmol/L 7.6       CT Chest Without Contrast Diagnostic    Result Date: 10/4/2024  1.Evidence for recurrent or progressing atypical/viral infection versus multifocal pneumonia. Other chronic inflammatory conditions with waxing and waning presentation could potentially be considered such as sarcoidosis. Recommend correlation for signs or symptoms of acute infection and follow-up to ensure improvement/resolution. Electronically  Signed: Brian Marquez MD  10/4/2024 4:57 PM EDT  Workstation ID: [id number]     Scheduled Meds:acetaminophen, 650 mg, Oral, Once   Or  acetaminophen, 650 mg, Oral, Once   Or  acetaminophen, 650 mg, Rectal, Once  allopurinol, 200 mg, Oral, Q8H  amoxicillin-clavulanate, 1 tablet, Oral, Q12H  furosemide, 40 mg, Intravenous, BID  gabapentin, 800 mg, Oral, Q8H  hydroxyurea, 1,000 mg, Oral, BID  insulin glargine, 28 Units, Subcutaneous, Nightly  insulin lispro, 2-7 Units, Subcutaneous, 4x Daily AC & at Bedtime  insulin lispro, 7 Units, Subcutaneous, TID With Meals  losartan, 12.5 mg, Oral, Q24H  metoprolol succinate XL, 50 mg, Oral, Q12H  mirtazapine, 15 mg, Oral, Nightly  [Held by provider] rivaroxaban, 10 mg, Oral, Daily  sodium chloride, 10 mL, Intravenous, Q12H  tiZANidine, 4 mg, Oral, Daily      Continuous Infusions:pain,       PRN Meds:.  acetaminophen **OR** acetaminophen **OR** acetaminophen    albuterol    ALPRAZolam    senna-docusate sodium **AND** polyethylene glycol **AND** bisacodyl **AND** bisacodyl    Calcium Replacement - Follow Nurse / BPA Driven Protocol    dextrose    dextrose    Diclofenac Sodium    glucagon (human recombinant)    hydrALAZINE    Magnesium Standard Dose Replacement - Follow Nurse / BPA Driven Protocol    ondansetron    Phosphorus Replacement - Follow Nurse / BPA Driven Protocol    Potassium Replacement - Follow Nurse / BPA Driven Protocol    [COMPLETED] Insert Peripheral IV **AND** sodium chloride    sodium chloride    sodium chloride     I reviewed the patient's new clinical results.    Assessment/Plan:     Active and Resolved Problems  Active Hospital Problems    Diagnosis  POA    **Acute hypoxemic respiratory failure [J96.01]  Yes    Acute on chronic HFrEF (heart failure with reduced ejection fraction) [I50.23]  Yes    Sinus tachycardia [R00.0]  Yes    Anemia due to chemotherapy [D64.81, T45.1X5A]  Yes    NICM (nonischemic cardiomyopathy) [I42.8]  Yes    Type 2 diabetes mellitus with  diabetic polyneuropathy, with long-term current use of insulin [E11.42, Z79.4]  Not Applicable    Medically noncompliant [Z91.199]  Not Applicable    History of pulmonary embolism [Z86.711]  Yes    CML (chronic myelocytic leukemia) [C92.10]  Yes      Resolved Hospital Problems   No resolved problems to display.     Acute hypoxemic respiratory failure due to Community Acquired Pneumonia and acute on chronic diastolic CHF  Patient requiring 5 L NC oxygen supplementation to maintain SPO2 > 90 %  CXR showed bilateral airspace opacities R>L  tx cefepime > Augmentin  Noted alphahemolytic Streptococcus in blood culture 1 out of 2 previously, atypical workup- NGTD  Repeat Xray to see interval change - no acute changes  Pulmonary recommendations noted- Augmentin and IV lasix     CML with new 45% blasts  Chronic anemia  Oncology service suspecting blast phase transformation  Follows with oncology service on an outpatient basis.  Completed blood transfusion prior to coming to ED  Transfuse blood products as needed  Oncology consulted and following.  Noted recommendations regarding rasburicase and Hydrea.  Per their note Case discussed with bone marrow transplant team at [facility].    Acute on chronic HFrEF (heart failure with reduced ejection fraction) secondary to NICM (nonischemic cardiomyopathy)  Bilateral lower extremity edema  Cardiology consulted  Previous EF 26-30% in Nov. 2022,  Repeat echocardiogram in March 2024 showed improvement in LV function with EF of 62%  Patient has been noncompliant with medications and outpatient follow up  High-sensitivity troponin of 17 and 22, proBNP of 4000  Start Lasix  Monitor I's and O's and replace electrolytes as needed  Creatinine 1.13  continue losartan, Toprol-XL and Jardiance  Pro BNP 4257>2153  -lasix 20 mg IV bid  TTE on 3/21/24 showed LVEF > 62 %, was 44 % on 3/10/24  Wean oxygen as tolerated     Type 1 DM  Continue insulin lantus 28 units HS  Continue lispro 7 units before  meals plus SS       History of anxiety  Continue Remeron   Continue Xanax    Sinus tachycardia-Due to underlying infection, anemia and hypoxia  Per cardiology Treat underlying causes  Increased Toprol-XL to twice daily    Medically noncompliant  Compliance encouraged  / consulted   HF Nurse Navigator consulted as above       VTE Prophylaxis:  Pharmacologic VTE prophylaxis orders are present.      Disposition Planning:   Barriers to Discharge:dyspnea  Anticipated Date of Discharge: TBD based on clinical course  Place of Discharge: home      Time: 40 minutes     Code Status and Medical Interventions: CPR (Attempt to Resuscitate); Full Support   Ordered at: 09/28/24 0443     Code Status (Patient has no pulse and is not breathing):    CPR (Attempt to Resuscitate)     Medical Interventions (Patient has pulse or is breathing):    Full Support       Signature: Electronically signed by Jayy Boggs MD, 10/06/24, 10:51 EDT.  [facility] [place] Hospitalist [facility]

## 2024-10-07 ENCOUNTER — APPOINTMENT (OUTPATIENT)
Dept: CT IMAGING | Facility: HOSPITAL | Age: 49
DRG: 193 | End: 2024-10-07
Payer: MEDICARE

## 2024-10-07 LAB
ALBUMIN SERPL-MCNC: 3.7 G/DL (ref 3.5–5.2)
ALBUMIN/GLOB SERPL: 1 G/DL
ALP SERPL-CCNC: 318 U/L (ref 39–117)
ALT SERPL W P-5'-P-CCNC: 11 U/L (ref 1–33)
ANION GAP SERPL CALCULATED.3IONS-SCNC: 10.6 MMOL/L (ref 5–15)
ANISOCYTOSIS BLD QL: ABNORMAL
AST SERPL-CCNC: 19 U/L (ref 1–32)
BASOPHILS # BLD MANUAL: 2.9 10*3/MM3 (ref 0–0.2)
BASOPHILS NFR BLD MANUAL: 7 % (ref 0–1.5)
BILIRUB SERPL-MCNC: 0.3 MG/DL (ref 0–1.2)
BLASTS NFR BLD MANUAL: 37 % (ref 0–0)
BUN SERPL-MCNC: 41 MG/DL (ref 6–20)
BUN/CREAT SERPL: 41.4 (ref 7–25)
CALCIUM SPEC-SCNC: 8.8 MG/DL (ref 8.6–10.5)
CHLORIDE SERPL-SCNC: 107 MMOL/L (ref 98–107)
CO2 SERPL-SCNC: 28.4 MMOL/L (ref 22–29)
CREAT SERPL-MCNC: 0.99 MG/DL (ref 0.57–1)
DEPRECATED RDW RBC AUTO: 63 FL (ref 37–54)
EGFRCR SERPLBLD CKD-EPI 2021: 70.5 ML/MIN/1.73
ENA RNP AB SER-ACNC: 0.2 AI (ref 0–0.9)
ENA SM AB SER-ACNC: <0.2 AI (ref 0–0.9)
EOSINOPHIL # BLD MANUAL: 0.83 10*3/MM3 (ref 0–0.4)
EOSINOPHIL NFR BLD MANUAL: 2 % (ref 0.3–6.2)
ERYTHROCYTE [DISTWIDTH] IN BLOOD BY AUTOMATED COUNT: 18.3 % (ref 12.3–15.4)
GLOBULIN UR ELPH-MCNC: 3.6 GM/DL
GLUCOSE BLDC GLUCOMTR-MCNC: 146 MG/DL (ref 70–105)
GLUCOSE BLDC GLUCOMTR-MCNC: 156 MG/DL (ref 70–105)
GLUCOSE BLDC GLUCOMTR-MCNC: 175 MG/DL (ref 70–105)
GLUCOSE BLDC GLUCOMTR-MCNC: 196 MG/DL (ref 70–105)
GLUCOSE BLDC GLUCOMTR-MCNC: 199 MG/DL (ref 70–105)
GLUCOSE SERPL-MCNC: 161 MG/DL (ref 65–99)
HCT VFR BLD AUTO: 24.1 % (ref 34–46.6)
HGB BLD-MCNC: 7.3 G/DL (ref 12–15.9)
LAB AP CASE REPORT: NORMAL
LYMPHOCYTES # BLD MANUAL: 9.1 10*3/MM3 (ref 0.7–3.1)
LYMPHOCYTES NFR BLD MANUAL: 8 % (ref 5–12)
MCH RBC QN AUTO: 28.4 PG (ref 26.6–33)
MCHC RBC AUTO-ENTMCNC: 30.3 G/DL (ref 31.5–35.7)
MCV RBC AUTO: 93.8 FL (ref 79–97)
METAMYELOCYTES NFR BLD MANUAL: 7 % (ref 0–0)
MONOCYTES # BLD: 3.31 10*3/MM3 (ref 0.1–0.9)
MYELOCYTES NFR BLD MANUAL: 1 % (ref 0–0)
NEUTROPHILS # BLD AUTO: 4.97 10*3/MM3 (ref 1.7–7)
NEUTROPHILS NFR BLD MANUAL: 7 % (ref 42.7–76)
NEUTS BAND NFR BLD MANUAL: 5 % (ref 0–5)
NRBC SPEC MANUAL: 5 /100 WBC (ref 0–0.2)
PATH REPORT.FINAL DX SPEC: NORMAL
PLAT MORPH BLD: NORMAL
PLATELET # BLD AUTO: 174 10*3/MM3 (ref 140–450)
PMV BLD AUTO: 10.6 FL (ref 6–12)
POLYCHROMASIA BLD QL SMEAR: ABNORMAL
POTASSIUM SERPL-SCNC: 4.5 MMOL/L (ref 3.5–5.2)
PROMYELOCYTES NFR BLD MANUAL: 4 % (ref 0–0)
PROT SERPL-MCNC: 7.3 G/DL (ref 6–8.5)
RBC # BLD AUTO: 2.57 10*6/MM3 (ref 3.77–5.28)
SCAN SLIDE: NORMAL
SODIUM SERPL-SCNC: 146 MMOL/L (ref 136–145)
URATE SERPL-MCNC: 3.6 MG/DL (ref 2.4–5.7)
VARIANT LYMPHS NFR BLD MANUAL: 22 % (ref 19.6–45.3)
WBC MORPH BLD: NORMAL
WBC NRBC COR # BLD AUTO: 41.38 10*3/MM3 (ref 3.4–10.8)

## 2024-10-07 PROCEDURE — 88323 CONSLTJ&REPRT MATRL PREP SLD: CPT | Performed by: PHYSICIAN ASSISTANT

## 2024-10-07 PROCEDURE — 88333 PATH CONSLTJ SURG CYTO XM 1: CPT | Performed by: PHYSICIAN ASSISTANT

## 2024-10-07 PROCEDURE — 88271 CYTOGENETICS DNA PROBE: CPT | Performed by: PHYSICIAN ASSISTANT

## 2024-10-07 PROCEDURE — 88342 IMHCHEM/IMCYTCHM 1ST ANTB: CPT

## 2024-10-07 PROCEDURE — 88305 TISSUE EXAM BY PATHOLOGIST: CPT | Performed by: PHYSICIAN ASSISTANT

## 2024-10-07 PROCEDURE — 88237 TISSUE CULTURE BONE MARROW: CPT

## 2024-10-07 PROCEDURE — 88341 IMHCHEM/IMCYTCHM EA ADD ANTB: CPT

## 2024-10-07 PROCEDURE — 25010000002 MORPHINE PER 10 MG: Performed by: NURSE PRACTITIONER

## 2024-10-07 PROCEDURE — 25010000002 ONDANSETRON PER 1 MG

## 2024-10-07 PROCEDURE — C1830 POWER BONE MARROW BX NEEDLE: HCPCS

## 2024-10-07 PROCEDURE — 88275 CYTOGENETICS 100-300: CPT

## 2024-10-07 PROCEDURE — 82948 REAGENT STRIP/BLOOD GLUCOSE: CPT | Performed by: STUDENT IN AN ORGANIZED HEALTH CARE EDUCATION/TRAINING PROGRAM

## 2024-10-07 PROCEDURE — 80053 COMPREHEN METABOLIC PANEL: CPT | Performed by: NURSE PRACTITIONER

## 2024-10-07 PROCEDURE — 88184 FLOWCYTOMETRY/ TC 1 MARKER: CPT

## 2024-10-07 PROCEDURE — 0023U ONC AML DNA DETCJ/NONDETCJ: CPT | Performed by: PHYSICIAN ASSISTANT

## 2024-10-07 PROCEDURE — 81120 IDH1 COMMON VARIANTS: CPT | Performed by: PHYSICIAN ASSISTANT

## 2024-10-07 PROCEDURE — 85007 BL SMEAR W/DIFF WBC COUNT: CPT | Performed by: NURSE PRACTITIONER

## 2024-10-07 PROCEDURE — 81450 HL NEO GSAP 5-50DNA/DNA&RNA: CPT | Performed by: PHYSICIAN ASSISTANT

## 2024-10-07 PROCEDURE — 07DR3ZX EXTRACTION OF ILIAC BONE MARROW, PERCUTANEOUS APPROACH, DIAGNOSTIC: ICD-10-PCS | Performed by: RADIOLOGY

## 2024-10-07 PROCEDURE — 88313 SPECIAL STAINS GROUP 2: CPT | Performed by: PHYSICIAN ASSISTANT

## 2024-10-07 PROCEDURE — 88185 FLOWCYTOMETRY/TC ADD-ON: CPT | Performed by: PHYSICIAN ASSISTANT

## 2024-10-07 PROCEDURE — 84550 ASSAY OF BLOOD/URIC ACID: CPT | Performed by: STUDENT IN AN ORGANIZED HEALTH CARE EDUCATION/TRAINING PROGRAM

## 2024-10-07 PROCEDURE — 77012 CT SCAN FOR NEEDLE BIOPSY: CPT

## 2024-10-07 PROCEDURE — 88311 DECALCIFY TISSUE: CPT | Performed by: PHYSICIAN ASSISTANT

## 2024-10-07 PROCEDURE — 99232 SBSQ HOSP IP/OBS MODERATE 35: CPT | Performed by: INTERNAL MEDICINE

## 2024-10-07 PROCEDURE — 25010000002 LIDOCAINE 1 % SOLUTION

## 2024-10-07 PROCEDURE — 63710000001 INSULIN GLARGINE PER 5 UNITS: Performed by: STUDENT IN AN ORGANIZED HEALTH CARE EDUCATION/TRAINING PROGRAM

## 2024-10-07 PROCEDURE — 25010000002 FUROSEMIDE PER 20 MG: Performed by: INTERNAL MEDICINE

## 2024-10-07 PROCEDURE — 99152 MOD SED SAME PHYS/QHP 5/>YRS: CPT

## 2024-10-07 PROCEDURE — 85025 COMPLETE CBC W/AUTO DIFF WBC: CPT | Performed by: NURSE PRACTITIONER

## 2024-10-07 PROCEDURE — 25010000002 MIDAZOLAM PER 1 MG

## 2024-10-07 PROCEDURE — 88264 CHROMOSOME ANALYSIS 20-25: CPT | Performed by: PHYSICIAN ASSISTANT

## 2024-10-07 PROCEDURE — 88312 SPECIAL STAINS GROUP 1: CPT | Performed by: PHYSICIAN ASSISTANT

## 2024-10-07 PROCEDURE — 82948 REAGENT STRIP/BLOOD GLUCOSE: CPT

## 2024-10-07 PROCEDURE — 25010000002 FENTANYL CITRATE (PF) 50 MCG/ML SOLUTION

## 2024-10-07 PROCEDURE — 63710000001 INSULIN LISPRO (HUMAN) PER 5 UNITS: Performed by: STUDENT IN AN ORGANIZED HEALTH CARE EDUCATION/TRAINING PROGRAM

## 2024-10-07 PROCEDURE — 88264 CHROMOSOME ANALYSIS 20-25: CPT | Performed by: HOSPITALIST

## 2024-10-07 RX ORDER — ONDANSETRON 2 MG/ML
INJECTION INTRAMUSCULAR; INTRAVENOUS AS NEEDED
Status: COMPLETED | OUTPATIENT
Start: 2024-10-07 | End: 2024-10-07

## 2024-10-07 RX ORDER — MIDAZOLAM HYDROCHLORIDE 1 MG/ML
INJECTION INTRAMUSCULAR; INTRAVENOUS AS NEEDED
Status: COMPLETED | OUTPATIENT
Start: 2024-10-07 | End: 2024-10-07

## 2024-10-07 RX ORDER — MORPHINE SULFATE 2 MG/ML
2 INJECTION, SOLUTION INTRAMUSCULAR; INTRAVENOUS EVERY 4 HOURS PRN
Status: DISCONTINUED | OUTPATIENT
Start: 2024-10-07 | End: 2024-10-10 | Stop reason: HOSPADM

## 2024-10-07 RX ORDER — METOLAZONE 5 MG/1
2.5 TABLET ORAL ONCE
Status: COMPLETED | OUTPATIENT
Start: 2024-10-07 | End: 2024-10-07

## 2024-10-07 RX ORDER — FENTANYL CITRATE 50 UG/ML
INJECTION, SOLUTION INTRAMUSCULAR; INTRAVENOUS
Status: ACTIVE
Start: 2024-10-07 | End: 2024-10-07

## 2024-10-07 RX ORDER — FENTANYL CITRATE 50 UG/ML
INJECTION, SOLUTION INTRAMUSCULAR; INTRAVENOUS AS NEEDED
Status: COMPLETED | OUTPATIENT
Start: 2024-10-07 | End: 2024-10-07

## 2024-10-07 RX ORDER — LIDOCAINE HYDROCHLORIDE 10 MG/ML
INJECTION, SOLUTION INFILTRATION; PERINEURAL AS NEEDED
Status: COMPLETED | OUTPATIENT
Start: 2024-10-07 | End: 2024-10-07

## 2024-10-07 RX ORDER — MIDAZOLAM HYDROCHLORIDE 1 MG/ML
INJECTION INTRAMUSCULAR; INTRAVENOUS
Status: ACTIVE
Start: 2024-10-07 | End: 2024-10-07

## 2024-10-07 RX ADMIN — FENTANYL CITRATE 100 MCG: 50 INJECTION, SOLUTION INTRAMUSCULAR; INTRAVENOUS at 11:35

## 2024-10-07 RX ADMIN — ALLOPURINOL 200 MG: 100 TABLET ORAL at 17:27

## 2024-10-07 RX ADMIN — INSULIN LISPRO 7 UNITS: 100 INJECTION, SOLUTION INTRAVENOUS; SUBCUTANEOUS at 17:28

## 2024-10-07 RX ADMIN — Medication 10 ML: at 21:01

## 2024-10-07 RX ADMIN — METOLAZONE 2.5 MG: 5 TABLET ORAL at 09:29

## 2024-10-07 RX ADMIN — HYDROXYUREA 1000 MG: 500 CAPSULE ORAL at 21:14

## 2024-10-07 RX ADMIN — FUROSEMIDE 40 MG: 10 INJECTION, SOLUTION INTRAMUSCULAR; INTRAVENOUS at 17:27

## 2024-10-07 RX ADMIN — INSULIN GLARGINE 28 UNITS: 100 INJECTION, SOLUTION SUBCUTANEOUS at 21:00

## 2024-10-07 RX ADMIN — FUROSEMIDE 40 MG: 10 INJECTION, SOLUTION INTRAMUSCULAR; INTRAVENOUS at 05:26

## 2024-10-07 RX ADMIN — METOPROLOL SUCCINATE 50 MG: 50 TABLET, EXTENDED RELEASE ORAL at 21:01

## 2024-10-07 RX ADMIN — GABAPENTIN 800 MG: 400 CAPSULE ORAL at 13:57

## 2024-10-07 RX ADMIN — ONDANSETRON 4 MG: 2 INJECTION INTRAMUSCULAR; INTRAVENOUS at 11:35

## 2024-10-07 RX ADMIN — GABAPENTIN 800 MG: 400 CAPSULE ORAL at 21:00

## 2024-10-07 RX ADMIN — INSULIN LISPRO 2 UNITS: 100 INJECTION, SOLUTION INTRAVENOUS; SUBCUTANEOUS at 09:29

## 2024-10-07 RX ADMIN — GABAPENTIN 800 MG: 400 CAPSULE ORAL at 05:26

## 2024-10-07 RX ADMIN — HYDROXYUREA 1000 MG: 500 CAPSULE ORAL at 13:58

## 2024-10-07 RX ADMIN — AMOXICILLIN AND CLAVULANATE POTASSIUM 1 TABLET: 875; 125 TABLET, FILM COATED ORAL at 21:00

## 2024-10-07 RX ADMIN — MIRTAZAPINE 15 MG: 15 TABLET, FILM COATED ORAL at 21:00

## 2024-10-07 RX ADMIN — Medication 10 ML: at 09:37

## 2024-10-07 RX ADMIN — MIDAZOLAM 1 MG: 1 INJECTION INTRAMUSCULAR; INTRAVENOUS at 11:40

## 2024-10-07 RX ADMIN — AMOXICILLIN AND CLAVULANATE POTASSIUM 1 TABLET: 875; 125 TABLET, FILM COATED ORAL at 09:29

## 2024-10-07 RX ADMIN — LIDOCAINE HYDROCHLORIDE 10 ML: 10 INJECTION, SOLUTION INFILTRATION; PERINEURAL at 11:43

## 2024-10-07 RX ADMIN — INSULIN LISPRO 7 UNITS: 100 INJECTION, SOLUTION INTRAVENOUS; SUBCUTANEOUS at 14:00

## 2024-10-07 RX ADMIN — MIDAZOLAM 1 MG: 1 INJECTION INTRAMUSCULAR; INTRAVENOUS at 11:35

## 2024-10-07 RX ADMIN — ALPRAZOLAM 0.5 MG: 0.5 TABLET ORAL at 21:00

## 2024-10-07 RX ADMIN — LOSARTAN POTASSIUM 12.5 MG: 25 TABLET, FILM COATED ORAL at 09:31

## 2024-10-07 RX ADMIN — METOPROLOL SUCCINATE 50 MG: 50 TABLET, EXTENDED RELEASE ORAL at 09:31

## 2024-10-07 RX ADMIN — INSULIN LISPRO 2 UNITS: 100 INJECTION, SOLUTION INTRAVENOUS; SUBCUTANEOUS at 17:28

## 2024-10-07 RX ADMIN — ACETAMINOPHEN 650 MG: 325 TABLET, FILM COATED ORAL at 13:14

## 2024-10-07 RX ADMIN — INSULIN LISPRO 2 UNITS: 100 INJECTION, SOLUTION INTRAVENOUS; SUBCUTANEOUS at 13:58

## 2024-10-07 RX ADMIN — ALLOPURINOL 200 MG: 100 TABLET ORAL at 09:31

## 2024-10-07 NOTE — SIGNIFICANT NOTE
"   10/07/24 1723   OTHER   Discipline physical therapist   Rehab Time/Intention   Session Not Performed patient/family declined treatment  (Pt declines therapy this date, has family visiting and reports, \"I just really don't feel up to it today, you can check back tomorrow.\")   Therapy Assessment/Plan (PT)   Criteria for Skilled Interventions Met (PT) yes;meets criteria;skilled treatment is necessary   Recommendation   PT - Next Appointment 10/08/24       "

## 2024-10-07 NOTE — PLAN OF CARE
Goal Outcome Evaluation:      Pt is resting, vss, no complaints, call light within reach, poc ongoing.
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
## 2024-10-07 NOTE — PROGRESS NOTES
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 antibiotics  Remains on 5 L of oxygen  Wean as tolerated  Consider pulmonology consultation     Anemia due to chemotherapy  H&H 7.3/24.1  Xarelto is on hold per hematology  May need another blood transfusion     CML (chronic myelocytic leukemia)  On chemotherapy, followed by oncology   White count is 41,000  Suspicion of blast crisis.  Started on hydroxyurea  Plan for bone marrow biopsy.     Medically noncompliant  Compliance encouraged  / consulted   HF Nurse Navigator consulted as above     Type 2 diabetes mellitus with diabetic polyneuropathy, with long-term current use of insulin  A1c of 8.2  On Lantus and SSI     Sinus tachycardia  Due to underlying infection, anemia and hypoxia  Treat underlying causes  Continue Toprol-XL 50 mg twice daily  Heart rate is better      Rohan Jackson MD  10/07/24  07:29 EDT

## 2024-10-07 NOTE — CASE MANAGEMENT/SOCIAL WORK
Continued Stay Note  [facility]     Patient Name: Nieves Mc  MRN: [id number]  Today's Date: 10/7/2024    Admit Date: 9/27/2024    Plan: Return home with [facility] (accepted- will need order.) Watch for new home O2 needs.   Discharge Plan       Row Name 10/07/24 1425       Plan    Plan Return home with [facility] (accepted- will need order.) Watch for new home O2 needs.    Plan Comments DC barriers: 5L NC, , elevated BUN/creat ratio, WBC 41.38, Hgb 7.3 blood cultures pending, IV lasix, CT with bone marrow biopsy today. Oncology, pulmonology following             Elsa Lawson RN     Office phone: [phone]  Office fax: [phone]

## 2024-10-07 NOTE — PLAN OF CARE
Goal Outcome Evaluation:           Problem: Adult Inpatient Plan of Care  Goal: Absence of Hospital-Acquired Illness or Injury  Intervention: Identify and Manage Fall Risk  Intervention: Prevent Skin Injury  Intervention: Prevent and Manage VTE (Venous Thromboembolism) Risk  Goal: Optimal Comfort and Wellbeing  Intervention: Provide Person-Centered Care     Problem: Heart Failure Comorbidity  Goal: Maintenance of Heart Failure Symptom Control  Intervention: Maintain Heart Failure-Management     Problem: Pain Chronic (Persistent) (Comorbidity Management)  Goal: Acceptable Pain Control and Functional Ability  Intervention: Manage Persistent Pain  Intervention: Optimize Psychosocial Wellbeing     Problem: Skin Injury Risk Increased  Goal: Skin Health and Integrity  Intervention: Optimize Skin Protection     Problem: Fall Injury Risk  Goal: Absence of Fall and Fall-Related Injury  Intervention: Identify and Manage Contributors  Intervention: Promote Injury-Free Environment     Problem: Adjustment to Illness (Sepsis/Septic Shock)  Goal: Optimal Coping  Intervention: Optimize Psychosocial Adjustment to Illness     Problem: Infection Progression (Sepsis/Septic Shock)  Goal: Absence of Infection Signs and Symptoms  Intervention: Promote Recovery           Patient A&O had bone biopsy today. Call light and bedside table within reach.

## 2024-10-07 NOTE — PROGRESS NOTES
Daily Progress Note        Acute hypoxemic respiratory failure    CML (chronic myelocytic leukemia)    History of pulmonary embolism    Medically noncompliant    Type 2 diabetes mellitus with diabetic polyneuropathy, with long-term current use of insulin    Sinus tachycardia    NICM (nonischemic cardiomyopathy)    Anemia due to chemotherapy    Acute on chronic HFrEF (heart failure with reduced ejection fraction)    Assessment:        Hypoxic respiratory insufficiency  Streptococcus, alphahemolytic bacteremia on 9/27/2024     Diffuse bilateral groundglass opacities and alveolar infiltrates    Significant leukocytosis with 45% blasts  History of CML  Afebrile    Chronic elevated right hemidiaphragm  Hypertension     Expanded respiratory panel and Legionella and strep urine antigen negative        2D echo 10-24    Left ventricular systolic function is normal. Left ventricular ejection fraction appears to be 61 - 65%.    Left ventricular wall thickness is consistent with borderline concentric hypertrophy.    Left ventricular diastolic function was normal.    The right ventricular cavity is borderline dilated.    Estimated right ventricular systolic pressure from tricuspid regurgitation is mildly elevated (35-45 mmHg).    Mild pulmonary hypertension is present.        Recommendations:     ANCA panel  Extract nuclear panel  JOSE  ESR    Augmentin  Blood cultures  Sputum cultures     Oxygen titration currently on 5 L    Gynecology following      Diuresis Lasix 20 mg po daily                    LOS: 10 days     Subjective         Objective     Vital signs for last 24 hours:  Vitals:    10/06/24 1543 10/06/24 2025 10/07/24 0000 10/07/24 0810   BP: 136/76 150/84 150/80 126/82   BP Location: Left arm Left arm  Right arm   Patient Position: Sitting Sitting  Sitting   Pulse: 100 108 102 99   Resp: 17 18 18 16   Temp: 98.2 °F (36.8 °C) 98.4 °F (36.9 °C)  98.1 °F (36.7 °C)   TempSrc: Oral Oral  Oral   SpO2: 91% 93% 94% 95%    Weight:       Height:           Intake/Output last 3 shifts:  I/O last 3 completed shifts:  In: 660 [P.O.:660]  Out: -   Intake/Output this shift:  No intake/output data recorded.      Radiology  Imaging Results (Last 24 Hours)       ** No results found for the last 24 hours. **            Labs:  Results from last 7 days   Lab Units 10/07/24  0246   WBC 10*3/mm3 41.38*   HEMOGLOBIN g/dL 7.3*   HEMATOCRIT % 24.1*   PLATELETS 10*3/mm3 174     Results from last 7 days   Lab Units 10/07/24  0246   SODIUM mmol/L 146*   POTASSIUM mmol/L 4.5   CHLORIDE mmol/L 107   CO2 mmol/L 28.4   BUN mg/dL 41*   CREATININE mg/dL 0.99   CALCIUM mg/dL 8.8   BILIRUBIN mg/dL 0.3   ALK PHOS U/L 318*   ALT (SGPT) U/L 11   AST (SGOT) U/L 19   GLUCOSE mg/dL 161*         Results from last 7 days   Lab Units 10/07/24  0246 10/06/24  1211 10/05/24  2339   ALBUMIN g/dL 3.7 3.8 3.6             Results from last 7 days   Lab Units 10/05/24  0518   MAGNESIUM mg/dL 1.8     Results from last 7 days   Lab Units 10/05/24  1538   INR  1.10   APTT seconds 24.5               Meds:   SCHEDULE  acetaminophen, 650 mg, Oral, Once   Or  acetaminophen, 650 mg, Oral, Once   Or  acetaminophen, 650 mg, Rectal, Once  allopurinol, 200 mg, Oral, Q8H  amoxicillin-clavulanate, 1 tablet, Oral, Q12H  furosemide, 40 mg, Intravenous, BID  gabapentin, 800 mg, Oral, Q8H  hydroxyurea, 1,000 mg, Oral, BID  insulin glargine, 28 Units, Subcutaneous, Nightly  insulin lispro, 2-7 Units, Subcutaneous, 4x Daily AC & at Bedtime  insulin lispro, 7 Units, Subcutaneous, TID With Meals  losartan, 12.5 mg, Oral, Q24H  metoprolol succinate XL, 50 mg, Oral, Q12H  mirtazapine, 15 mg, Oral, Nightly  [Held by provider] rivaroxaban, 10 mg, Oral, Daily  sodium chloride, 10 mL, Intravenous, Q12H  tiZANidine, 4 mg, Oral, Daily      Infusions  pain,       PRNs    acetaminophen **OR** acetaminophen **OR** acetaminophen    albuterol    ALPRAZolam    senna-docusate sodium **AND** polyethylene glycol  **AND** bisacodyl **AND** bisacodyl    Calcium Replacement - Follow Nurse / BPA Driven Protocol    dextrose    dextrose    Diclofenac Sodium    glucagon (human recombinant)    hydrALAZINE    Magnesium Standard Dose Replacement - Follow Nurse / BPA Driven Protocol    ondansetron    Phosphorus Replacement - Follow Nurse / BPA Driven Protocol    Potassium Replacement - Follow Nurse / BPA Driven Protocol    [COMPLETED] Insert Peripheral IV **AND** sodium chloride    sodium chloride    sodium chloride    Physical Exam:  Physical Exam  Cardiovascular:      Heart sounds: No murmur heard.     No gallop.   Pulmonary:      Effort: No respiratory distress.      Breath sounds: No stridor. Rhonchi and rales present. No wheezing.   Chest:      Chest wall: No tenderness.         ROS  Review of Systems   Respiratory:  Positive for cough and shortness of breath. Negative for wheezing and stridor.    Cardiovascular:  Positive for palpitations and leg swelling. Negative for chest pain.             Total time spent with patient greater than: 45 Minutes

## 2024-10-07 NOTE — PROGRESS NOTES
[facility] MEDICINE SERVICE  DAILY PROGRESS NOTE    NAME: Nieves Mc  : 1975  MRN: [id number]      LOS: 10 days     PROVIDER OF SERVICE: Timothy Duane Brammell, MD    Chief Complaint: Acute hypoxemic respiratory failure    Subjective:     Interval History:  History taken from: patient    Patient denies any severe shortness of breath.  Does not have a home oxygen.  Denies any pain complaints.  Eating without issue.  Awaiting bone marrow.  Denies any bowel or bladder issues.  Denies any other additional acute issues.        Review of Systems:   Review of Systems   All other systems reviewed and are negative.      Objective:     Vital Signs  Temp:  [97.9 °F (36.6 °C)-98.4 °F (36.9 °C)] 98.1 °F (36.7 °C)  Heart Rate:  [] 99  Resp:  [16-18] 16  BP: (117-150)/(70-84) 126/82  Flow (L/min):  [5] 5   Body mass index is 36.22 kg/m².    Physical Exam  Physical Exam  Vitals reviewed.   Constitutional:       Appearance: She is obese.   HENT:      Head: Normocephalic.   Cardiovascular:      Rate and Rhythm: Normal rate.   Pulmonary:      Effort: Pulmonary effort is normal.      Breath sounds: Normal breath sounds.      Comments: Left lower lobe rales  Abdominal:      General: Bowel sounds are normal.      Palpations: Abdomen is soft.      Tenderness: There is no abdominal tenderness.   Musculoskeletal:         General: Swelling present.      Comments: 1-2+ edema worse on the right   Neurological:      Mental Status: She is alert.            Diagnostic Data    Results from last 7 days   Lab Units 10/07/24  0246   WBC 10*3/mm3 41.38*   HEMOGLOBIN g/dL 7.3*   HEMATOCRIT % 24.1*   PLATELETS 10*3/mm3 174   GLUCOSE mg/dL 161*   CREATININE mg/dL 0.99   BUN mg/dL 41*   SODIUM mmol/L 146*   POTASSIUM mmol/L 4.5   AST (SGOT) U/L 19   ALT (SGPT) U/L 11   ALK PHOS U/L 318*   BILIRUBIN mg/dL 0.3   ANION GAP mmol/L 10.6       No radiology results for the last day          Assessment:     Acute hypoxemic respiratory  failure  Community-acquired multifocal pneumonia  Acute on chronic diastolic congestive heart failure  Chronic myelogenous leukemia with blasts  Insulin requiring diabetes Type 2  Chronic anxiety  Medical noncompliance  History of pulmonary emboli  Chronic anticoagulation on hold at present await bone marrow bx  Strep bacteremia  anemia      Plan: Awaiting bone marrow biopsy.  Attempting diuresis.  Add metolazone x 1.  Cardiology and pulmonary following.  Completing oral antibiotics.  Follow-up labs.  Discharge planning based on oncology and pulmonary recommendations.    Active and Resolved Problems  Active Hospital Problems    Diagnosis  POA    **Acute hypoxemic respiratory failure [J96.01]  Yes    Acute on chronic HFrEF (heart failure with reduced ejection fraction) [I50.23]  Yes    Sinus tachycardia [R00.0]  Yes    Anemia due to chemotherapy [D64.81, T45.1X5A]  Yes    NICM (nonischemic cardiomyopathy) [I42.8]  Yes    Type 2 diabetes mellitus with diabetic polyneuropathy, with long-term current use of insulin [E11.42, Z79.4]  Not Applicable    Medically noncompliant [Z91.199]  Not Applicable    History of pulmonary embolism [Z86.711]  Yes    CML (chronic myelocytic leukemia) [C92.10]  Yes      Resolved Hospital Problems   No resolved problems to display.           VTE Prophylaxis:  Pharmacologic VTE prophylaxis orders are present.             Disposition Planning:     Barriers to Discharge:oncology and pulmonary clearance  Anticipated Date of Discharge: 10/10  Place of Discharge: home      Time: 30 minutes     Code Status and Medical Interventions: CPR (Attempt to Resuscitate); Full Support   Ordered at: 09/28/24 0443     Code Status (Patient has no pulse and is not breathing):    CPR (Attempt to Resuscitate)     Medical Interventions (Patient has pulse or is breathing):    Full Support       Signature: Electronically signed by Timothy Duane Brammell, MD, 10/07/24, 08:43 EDT.  [facility] Hospitalist Team

## 2024-10-07 NOTE — H&P
[facility]   Interventional Radiology H&P    Patient Name: Nieves Mc  : 1975  MRN: [id number]  Primary Care Physician:  Pankaj Stover MD  Referring Physician: No Known Provider  Date of admission: 2024    Subjective   Subjective     HPI:  Nieves Mc is a 48 y.o. female with chronic myeloid leukemia.    Review of Systems:   Constitutional no fever,  no weight loss       Otolaryngeal no difficulty swallowing   Cardiovascular no chest pain   Pulmonary no cough, no sputum production   Gastrointestinal no constipation, no diarrhea                         Personal History       Past Medical/Surgical History:   Past Medical History:   Diagnosis Date    Acute respiratory failure with hypoxia 2022    Adverse effect of chemotherapy 2023    Bilateral subdural hematomas 2023    Bone pain     Chronic constipation 2023    CML (chronic myelocytic leukemia) 2018    COVID-19 virus infection 2022    Diabetes mellitus     Diabetic gastroparesis 2023    Extremity pain     Carlin. legs pain    Fall during current hospitalization 2023    Leg pain     left leg greater    Medically noncompliant 2021    Migraine     Petechial rash 2023    Pubic ramus fracture, left, closed, initial encounter 2023    Pulmonary embolism     Traumatic subdural hemorrhage without loss of consciousness 2023    Tumor lysis syndrome 2022    UTI (urinary tract infection) 2023    Vision loss     doing surgery     Past Surgical History:   Procedure Laterality Date    BONE MARROW BIOPSY      BREAST SURGERY      BRONCHOSCOPY N/A 2022    Procedure: BRONCHOSCOPY bilateral lung washing;  Surgeon: Charlene Camara MD;  Location: [facility] ENDOSCOPY;  Service: Pulmonary;  Laterality: N/A;  post: rule out infection vs transfusion lung injury     SECTION      CHOLECYSTECTOMY      COLONOSCOPY N/A 2023    Procedure: COLONOSCOPY;  Surgeon: Freddy Villeda  MD;  Location: [facility] ENDOSCOPY;  Service: Gastroenterology;  Laterality: N/A;  poor prep    ENDOSCOPY N/A 2023    Procedure: ESOPHAGOGASTRODUODENOSCOPY with biopsy x2 areas;  Surgeon: Freddy Villeda MD;  Location: [facility] ENDOSCOPY;  Service: Gastroenterology;  Laterality: N/A;  food in stomach;abnormal duodenal mucosa    EYE SURGERY      laser surgery due  to hemmorage--- 2021-- another surgery  lt eye11/15/21    RETINAL DETACHMENT SURGERY      SPINE SURGERY      Lombardi spinal block    TUBAL ABDOMINAL LIGATION         Social History:  reports that she has never smoked. She has never used smokeless tobacco. She reports that she does not drink alcohol and does not use drugs.    Medications:  Medications Prior to Admission   Medication Sig Dispense Refill Last Dose    acetaminophen (TYLENOL) 325 MG tablet Take 2 tablets by mouth Every 4 (Four) Hours As Needed for Moderate Pain. Indications: Fever, Pain 60 tablet 3 2024    ALPRAZolam (Xanax) 0.5 MG tablet Take 1 tablet by mouth At Night As Needed for Anxiety. Indications: Feeling Anxious 30 tablet 0 2024    Diclofenac Sodium (VOLTAREN) 1 % gel gel Apply 4 g topically to the appropriate area as directed 4 (Four) Times a Day As Needed (hip pain). 100 g 2     [] doxycycline (VIBRAMYICN) 100 MG tablet Take 1 tablet by mouth 2 (Two) Times a Day for 10 days. 20 tablet 0 2024    furosemide (LASIX) 40 MG tablet Take 1 tablet by mouth Daily. Indications: Edema 30 tablet 2 2024    gabapentin (Neurontin) 800 MG tablet Take 1 tablet by mouth 3 (Three) Times a Day. Ordered through [facility]  Indications: Diabetes with Nerve Disease 90 tablet 2 2024    Insulin Glargine (LANTUS SOLOSTAR) 100 UNIT/ML injection pen Inject 28 Units under the skin into the appropriate area as directed Every Night. Indications: Type 2 Diabetes 30 mL 0 2024    Insulin Lispro, 1 Unit Dial, (HumaLOG KwikPen) 100 UNIT/ML solution pen-injector Inject 7 Units  under the skin into the appropriate area as directed 3 (Three) Times a Day Before Meals. Dx code: E11.65 15 mL 2 9/26/2024    levoFLOXacin (Levaquin) 500 MG tablet Take 1 tablet by mouth Daily. 7 tablet 0 9/26/2024    mirtazapine (REMERON) 15 MG tablet Take 1 tablet by mouth Every Night.   9/26/2024    pain patient supplied pump by Intrathecal route Continuous.   9/27/2024    PONATinib HCl (Iclusig) 10 MG tablet Take 10 mg by mouth Every Other Day. Take with or without food.  Swallow whole, do not crush or chew. 30 tablet 3 9/26/2024    rivaroxaban (Xarelto) 10 MG tablet Take 1 tablet by mouth Daily. 30 tablet 6 9/26/2024    tiZANidine (ZANAFLEX) 4 MG tablet Take 1 tablet by mouth Daily. Indications: Musculoskeletal Pain 90 tablet 1 9/26/2024     Current medications:  allopurinol, 200 mg, Oral, Q8H  amoxicillin-clavulanate, 1 tablet, Oral, Q12H  furosemide, 40 mg, Intravenous, BID  gabapentin, 800 mg, Oral, Q8H  hydroxyurea, 1,000 mg, Oral, BID  insulin glargine, 28 Units, Subcutaneous, Nightly  insulin lispro, 2-7 Units, Subcutaneous, 4x Daily AC & at Bedtime  insulin lispro, 7 Units, Subcutaneous, TID With Meals  losartan, 12.5 mg, Oral, Q24H  metoprolol succinate XL, 50 mg, Oral, Q12H  mirtazapine, 15 mg, Oral, Nightly  [Held by provider] rivaroxaban, 10 mg, Oral, Daily  sodium chloride, 10 mL, Intravenous, Q12H      Current IV drips:  pain,         Allergies:  Allergies   Allergen Reactions    Contrast Dye (Echo Or Unknown Ct/Mr) Shortness Of Breath and Nausea And Vomiting     Flushed        Objective    Objective     Vitals:   Temp:  [97.9 °F (36.6 °C)-98.4 °F (36.9 °C)] 98.1 °F (36.7 °C)  Heart Rate:  [] 102  Resp:  [16-18] 16  BP: (117-150)/(70-84) 132/80  Flow (L/min):  [5] 5      Physical Exam:   Constitutional: Awake, alert, No acute distress    Respiratory: Clear to auscultation bilaterally, nonlabored respirations    Cardiovascular: RRR, no murmurs, rubs, or gallops, palpable pedal pulses  "bilaterally   Gastrointestinal: Positive bowel sounds, soft, nontender, nondistended        ASA SCALE ASSESSMENT:  2-Mild to moderate systemic disease, medically well controlled, with no functional limitation    MALLAMPATI CLASSIFICATION:  2-Able to visualize the soft palate, fauces, uvula. The anterior & posterior tonsilar pillars are hidden by the tongue.       Result Review        Result Review:     Sodium   Date Value Ref Range Status   10/07/2024 146 (H) 136 - 145 mmol/L Final   10/06/2024 143 136 - 145 mmol/L Final   10/05/2024 142 136 - 145 mmol/L Final   10/05/2024 142 136 - 145 mmol/L Final   10/05/2024 140 136 - 145 mmol/L Final       Potassium   Date Value Ref Range Status   10/07/2024 4.5 3.5 - 5.2 mmol/L Final   10/06/2024 4.9 3.5 - 5.2 mmol/L Final   10/05/2024 4.6 3.5 - 5.2 mmol/L Final   10/05/2024 5.3 (H) 3.5 - 5.2 mmol/L Final   10/05/2024 4.6 3.5 - 5.2 mmol/L Final       Chloride   Date Value Ref Range Status   10/07/2024 107 98 - 107 mmol/L Final   10/06/2024 106 98 - 107 mmol/L Final   10/05/2024 107 98 - 107 mmol/L Final   10/05/2024 106 98 - 107 mmol/L Final   10/05/2024 104 98 - 107 mmol/L Final       No results found for: \"PLASMABICARB\"    BUN   Date Value Ref Range Status   10/07/2024 41 (H) 6 - 20 mg/dL Final   10/06/2024 41 (H) 6 - 20 mg/dL Final   10/05/2024 41 (H) 6 - 20 mg/dL Final   10/05/2024 44 (H) 6 - 20 mg/dL Final   10/05/2024 41 (H) 6 - 20 mg/dL Final       Creatinine   Date Value Ref Range Status   10/07/2024 0.99 0.57 - 1.00 mg/dL Final   10/06/2024 1.06 (H) 0.57 - 1.00 mg/dL Final   10/05/2024 1.10 (H) 0.57 - 1.00 mg/dL Final   10/05/2024 1.31 (H) 0.57 - 1.00 mg/dL Final   10/05/2024 1.44 (H) 0.57 - 1.00 mg/dL Final       Calcium   Date Value Ref Range Status   10/07/2024 8.8 8.6 - 10.5 mg/dL Final   10/06/2024 8.7 8.6 - 10.5 mg/dL Final   10/05/2024 8.6 8.6 - 10.5 mg/dL Final   10/05/2024 9.1 8.6 - 10.5 mg/dL Final   10/05/2024 8.9 8.6 - 10.5 mg/dL Final           No " "components found for: \"GLUCOSE.*\"  Results from last 7 days   Lab Units 10/07/24  0246   WBC 10*3/mm3 41.38*   HEMOGLOBIN g/dL 7.3*   HEMATOCRIT % 24.1*   PLATELETS 10*3/mm3 174      Results from last 7 days   Lab Units 10/05/24  1538   INR  1.10           Assessment / Plan     Assesment:   Chronic myeloid leukemia.      Plan:   CT guided bone marrow aspiration and biopsy.     The risks and benefits of the procedure were discussed with the patient.    Electronically signed by JP Hsu, 10/07/24, 11:08 AM EDT.  "

## 2024-10-07 NOTE — PROGRESS NOTES
Hematology/Oncology Inpatient Progress Note    PATIENT NAME: Nieves Mc  : 1975  MRN: [id number]    CHIEF COMPLAINT: Hypoxia    HISTORY OF PRESENT ILLNESS:      Nieves Mc is a 48 y.o. female who presented to [facility] on 2024 with complaints of shortness of breath.  She was found to hypoxic with SpO2 76% while undergoing evaluation for blood transfusion.  She was subsequently sent to the ED for further evaluation.  She does report bilateral leg swelling.  In the ED she was saturating 86% and requiring 3 L nasal cannula to maintain PaO2 over 90%.  She was mildly tachycardic, hypertensive and afebrile.  Chest x-ray completed showed patchy right greater than left airspace opacities.  CBC was notable for neutropenia, anemia.  She was started on IV antibiotics.  Patient is now needing 5 L oxygen.  Cardiology and pulmonology have been consulted.  It appears she has been noncompliant with her cardiac medications and her cardiology outpatient follow-up.  Interval chest x-ray on 10/1/2024 showed stable patchy multifocal airspace infiltrates interstitial changes throughout the lungs bilaterally.  She had duplex ultrasound bilateral lower extremities due to swelling and this was normal.      10/04/24  Hematology/Oncology was consulted.  She is established with Dr. Lerma for CML.  She is currently on treatment with ponatinib, but most recent office visit with Dr. Lerma she had stated she has been noncompliant over the last 3 to 4 weeks.  She was encouraged at that visit to restart her treatment.    10/5/24: Pt seen today for follow up. Reported that her shortness of breath has improved. However continues to be hypoxic off Oxygen support. She is currently on IV antibiotics.    Subjective     Denies any new issues.    ROS:  Review of Systems   Constitutional:  Positive for fatigue.   HENT: Negative.     Eyes: Negative.    Respiratory:  Positive for shortness of breath.    Cardiovascular:  Negative.    Gastrointestinal: Negative.    Endocrine: Negative.    Genitourinary: Negative.    Musculoskeletal: Negative.    Skin: Negative.    Allergic/Immunologic: Negative.    Neurological: Negative.    Hematological: Negative.    Psychiatric/Behavioral: Negative.          MEDICATIONS:    Scheduled Meds:  acetaminophen, 650 mg, Oral, Once   Or  acetaminophen, 650 mg, Oral, Once   Or  acetaminophen, 650 mg, Rectal, Once  allopurinol, 200 mg, Oral, Q8H  amoxicillin-clavulanate, 1 tablet, Oral, Q12H  furosemide, 40 mg, Intravenous, BID  gabapentin, 800 mg, Oral, Q8H  hydroxyurea, 1,000 mg, Oral, BID  insulin glargine, 28 Units, Subcutaneous, Nightly  insulin lispro, 2-7 Units, Subcutaneous, 4x Daily AC & at Bedtime  insulin lispro, 7 Units, Subcutaneous, TID With Meals  losartan, 12.5 mg, Oral, Q24H  metoprolol succinate XL, 50 mg, Oral, Q12H  mirtazapine, 15 mg, Oral, Nightly  [Held by provider] rivaroxaban, 10 mg, Oral, Daily  sodium chloride, 10 mL, Intravenous, Q12H  tiZANidine, 4 mg, Oral, Daily       Continuous Infusions:  pain,        PRN Meds:    acetaminophen **OR** acetaminophen **OR** acetaminophen    albuterol    ALPRAZolam    senna-docusate sodium **AND** polyethylene glycol **AND** bisacodyl **AND** bisacodyl    Calcium Replacement - Follow Nurse / BPA Driven Protocol    dextrose    dextrose    Diclofenac Sodium    glucagon (human recombinant)    hydrALAZINE    Magnesium Standard Dose Replacement - Follow Nurse / BPA Driven Protocol    ondansetron    Phosphorus Replacement - Follow Nurse / BPA Driven Protocol    Potassium Replacement - Follow Nurse / BPA Driven Protocol    [COMPLETED] Insert Peripheral IV **AND** sodium chloride    sodium chloride    sodium chloride     ALLERGIES:    Allergies   Allergen Reactions    Contrast Dye (Echo Or Unknown Ct/Mr) Shortness Of Breath and Nausea And Vomiting     Flushed        Objective    VITALS:   /80   Pulse 102   Temp 98.4 °F (36.9 °C) (Oral)   Resp  "18   Ht 162.6 cm (64\")   Wt 95.7 kg (211 lb)   LMP 05/25/2022 (Approximate)   SpO2 94%   BMI 36.22 kg/m²     PHYSICAL EXAM: (performed by MD)  Physical Exam  Constitutional:       Appearance: She is normal weight. She is ill-appearing.   HENT:      Head: Normocephalic and atraumatic.      Right Ear: External ear normal.      Left Ear: External ear normal.      Nose: Nose normal.      Mouth/Throat:      Mouth: Mucous membranes are moist.      Pharynx: Oropharynx is clear.   Eyes:      Extraocular Movements: Extraocular movements intact.      Conjunctiva/sclera: Conjunctivae normal.      Pupils: Pupils are equal, round, and reactive to light.   Cardiovascular:      Rate and Rhythm: Normal rate.      Pulses: Normal pulses.   Pulmonary:      Effort: Pulmonary effort is normal.   Abdominal:      General: Abdomen is flat.      Palpations: Abdomen is soft.   Musculoskeletal:         General: Normal range of motion.      Cervical back: Normal range of motion and neck supple.   Skin:     General: Skin is warm.   Neurological:      Mental Status: She is alert.   Psychiatric:         Mood and Affect: Mood normal.         Behavior: Behavior normal.         Thought Content: Thought content normal.         Judgment: Judgment normal.      I have reexamined the patient and the results are consistent with the previously documented exam. Meghann Lerma MD       RECENT LABS:  Lab Results (last 24 hours)       Procedure Component Value Units Date/Time    POC Glucose Once [[id number]]  (Abnormal) Collected: 10/07/24 0715    Specimen: Blood Updated: 10/07/24 0717     Glucose 199 mg/dL      Comment: Serial Number: [id number]:  [id number]       Pathology Consultation [[id number]] Collected: 10/05/24 0518    Specimen: Blood, Venous Line Updated: 10/07/24 0655    CBC & Differential [[id number]]  (Abnormal) Collected: 10/07/24 0246    Specimen: Blood from Arm, Left Updated: 10/07/24 0347    Narrative:      The following orders " were created for panel order CBC & Differential.  Procedure                               Abnormality         Status                     ---------                               -----------         ------                     CBC Auto Differential[[id number]]        Abnormal            Final result               Scan Slide[[id number]]                                       Final result                 Please view results for these tests on the individual orders.    Scan Slide [[id number]] Collected: 10/07/24 0246    Specimen: Blood from Arm, Left Updated: 10/07/24 0347     Scan Slide --     Comment: See Manual Differential Results       Manual Differential [[id number]]  (Abnormal) Collected: 10/07/24 0246    Specimen: Blood from Arm, Left Updated: 10/07/24 0347     Neutrophil % 7.0 %      Lymphocyte % 22.0 %      Monocyte % 8.0 %      Eosinophil % 2.0 %      Basophil % 7.0 %      Bands %  5.0 %      Metamyelocyte % 7.0 %      Myelocyte % 1.0 %      Promyelocyte % 4.0 %      Blasts % 37.0 %      Neutrophils Absolute 4.97 10*3/mm3      Lymphocytes Absolute 9.10 10*3/mm3      Monocytes Absolute 3.31 10*3/mm3      Eosinophils Absolute 0.83 10*3/mm3      Basophils Absolute 2.90 10*3/mm3      nRBC 5.0 /100 WBC      Anisocytosis Slight/1+     Polychromasia Slight/1+     WBC Morphology Normal     Platelet Morphology Normal    Narrative:      Reviewed by Pathologist within the past 30 days on 10/05/24 .      CBC Auto Differential [[id number]]  (Abnormal) Collected: 10/07/24 0246    Specimen: Blood from Arm, Left Updated: 10/07/24 0347     WBC 41.38 10*3/mm3      RBC 2.57 10*6/mm3      Hemoglobin 7.3 g/dL      Hematocrit 24.1 %      MCV 93.8 fL      MCH 28.4 pg      MCHC 30.3 g/dL      RDW 18.3 %      RDW-SD 63.0 fl      MPV 10.6 fL      Platelets 174 10*3/mm3     Narrative:      The previously reported component NRBC is no longer being reported. Previous result was 1.2 /100 WBC (Reference Range: 0.0-0.2 /100 WBC) on 10/7/2024 at  0326 EDT.    Comprehensive Metabolic Panel [[id number]]  (Abnormal) Collected: 10/07/24 0246    Specimen: Blood from Arm, Left Updated: 10/07/24 0341     Glucose 161 mg/dL      BUN 41 mg/dL      Creatinine 0.99 mg/dL      Sodium 146 mmol/L      Potassium 4.5 mmol/L      Chloride 107 mmol/L      CO2 28.4 mmol/L      Calcium 8.8 mg/dL      Total Protein 7.3 g/dL      Albumin 3.7 g/dL      ALT (SGPT) 11 U/L      AST (SGOT) 19 U/L      Alkaline Phosphatase 318 U/L      Total Bilirubin 0.3 mg/dL      Globulin 3.6 gm/dL      A/G Ratio 1.0 g/dL      BUN/Creatinine Ratio 41.4     Anion Gap 10.6 mmol/L      eGFR 70.5 mL/min/1.73     Narrative:      GFR Normal >60  Chronic Kidney Disease <60  Kidney Failure <15      Uric Acid [[id number]]  (Normal) Collected: 10/07/24 0246    Specimen: Blood from Arm, Left Updated: 10/07/24 0341     Uric Acid 3.6 mg/dL     POC Glucose Once [[id number]]  (Abnormal) Collected: 10/06/24 2020    Specimen: Blood Updated: 10/06/24 2023     Glucose 166 mg/dL      Comment: Serial Number: [id number]:  [id number]       POC Glucose Once [[id number]]  (Abnormal) Collected: 10/06/24 1546    Specimen: Blood Updated: 10/06/24 1549     Glucose 196 mg/dL      Comment: Serial Number: [id number]:  [id number]       CBC & Differential [[id number]]  (Abnormal) Collected: 10/06/24 1211    Specimen: Blood from Arm, Left Updated: 10/06/24 1252    Narrative:      The following orders were created for panel order CBC & Differential.  Procedure                               Abnormality         Status                     ---------                               -----------         ------                     CBC Auto Differential[[id number]]        Abnormal            Final result               Scan Slide[[id number]]                                       Final result                 Please view results for these tests on the individual orders.    Scan Slide [[id number]] Collected: 10/06/24 1211    Specimen: Blood from  Arm, Left Updated: 10/06/24 1252     Scan Slide --     Comment: See Manual Differential Results       Manual Differential [[id number]]  (Abnormal) Collected: 10/06/24 1211    Specimen: Blood from Arm, Left Updated: 10/06/24 1252     Neutrophil % 4.0 %      Lymphocyte % 14.0 %      Monocyte % 1.0 %      Eosinophil % 1.0 %      Basophil % 6.0 %      Bands %  2.0 %      Metamyelocyte % 6.0 %      Myelocyte % 4.0 %      Promyelocyte % 1.0 %      Atypical Lymphocyte % 8.0 %      Blasts % 51.0 %      Prolymphocyte % 2.0 %      Neutrophils Absolute 2.52 10*3/mm3      Lymphocytes Absolute 9.25 10*3/mm3      Monocytes Absolute 0.42 10*3/mm3      Eosinophils Absolute 0.42 10*3/mm3      Basophils Absolute 2.52 10*3/mm3      nRBC 2.0 /100 WBC      Anisocytosis Slight/1+     Hypochromia Slight/1+     Poikilocytes Slight/1+     Polychromasia Slight/1+     RBC Fragments Slight/1+     Stomatocytes Slight/1+     Toxic Granulation Slight/1+     Large Platelets Slight/1+    Narrative:      Reviewed by Pathologist within the past 30 days on 10/05/2024.      CBC Auto Differential [[id number]]  (Abnormal) Collected: 10/06/24 1211    Specimen: Blood from Arm, Left Updated: 10/06/24 1252     WBC 42.04 10*3/mm3      RBC 2.81 10*6/mm3      Hemoglobin 7.7 g/dL      Hematocrit 26.1 %      MCV 92.9 fL      MCH 27.4 pg      MCHC 29.5 g/dL      RDW 18.2 %      RDW-SD 61.7 fl      MPV 9.7 fL      Platelets 136 10*3/mm3     Narrative:      The previously reported component NRBC is no longer being reported. Previous result was 0.9 /100 WBC (Reference Range: 0.0-0.2 /100 WBC) on 10/6/2024 at 1225 EDT.    Comprehensive Metabolic Panel [[id number]]  (Abnormal) Collected: 10/06/24 1211    Specimen: Blood from Arm, Left Updated: 10/06/24 1242     Glucose 125 mg/dL      BUN 41 mg/dL      Creatinine 1.06 mg/dL      Sodium 143 mmol/L      Potassium 4.9 mmol/L      Chloride 106 mmol/L      CO2 27.9 mmol/L      Calcium 8.7 mg/dL      Total Protein 7.4 g/dL       Albumin 3.8 g/dL      ALT (SGPT) 13 U/L      AST (SGOT) 19 U/L      Alkaline Phosphatase 330 U/L      Total Bilirubin 0.4 mg/dL      Globulin 3.6 gm/dL      A/G Ratio 1.1 g/dL      BUN/Creatinine Ratio 38.7     Anion Gap 9.1 mmol/L      eGFR 64.9 mL/min/1.73     Narrative:      GFR Normal >60  Chronic Kidney Disease <60  Kidney Failure <15      Uric Acid [[id number]]  (Abnormal) Collected: 10/06/24 1211    Specimen: Blood from Arm, Left Updated: 10/06/24 1242     Uric Acid 6.7 mg/dL     Lactate Dehydrogenase [[id number]]  (Abnormal) Collected: 10/06/24 1211    Specimen: Blood from Arm, Left Updated: 10/06/24 1242      U/L     POC Glucose 4x Daily Before Meals & at Bedtime [[id number]]  (Abnormal) Collected: 10/06/24 1222    Specimen: Blood Updated: 10/06/24 1224     Glucose 112 mg/dL      Comment: Serial Number: [id number]:  [id number]       Flow Cytometry [[id number]] Collected: 10/06/24 1211    Specimen: Blood from Arm, Right Updated: 10/06/24 1218    Blood Culture - Blood, Hand, Left [[id number]]  (Normal) Collected: 10/04/24 1111    Specimen: Blood from Hand, Left Updated: 10/06/24 1130     Blood Culture No growth at 2 days    Blood Culture - Blood, Hand, Right [[id number]]  (Normal) Collected: 10/04/24 1111    Specimen: Blood from Hand, Right Updated: 10/06/24 1130     Blood Culture No growth at 2 days    POC Glucose Once [[id number]]  (Abnormal) Collected: 10/06/24 0823    Specimen: Blood Updated: 10/06/24 0825     Glucose 118 mg/dL      Comment: Serial Number: [id number]:  [id number]               PENDING RESULTS:     IMAGING REVIEWED:  No radiology results for the last day    Assessment & Plan :      ASSESSMENT:    Accelerated phase CML:    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   -As per chart review, She has not been compliant and was encouraged to restart at last visit 9/26/2024.  Although  patient disputed this and stated that she has been compliant except for last 1-2 weeks.  -Ponatinib on hold for now.   -labs reviewed, Noted to have uptrending Blast count and immature forms, 45% blasts noted.  -Coag parameters normal. S. Mg and P levels WNL. Renal function is stable.  -Fibrinogen levels are sent, pending. Flow Cytometry is pending.  -overall picture is concerning for transformation to blast crisis given >30% blasts in peripheral circulation.  -Will plan to start her on Rasburicase 6mg Daily,  -She has low Hb 7.3 and Plt count 157, Will start at Hydrea 1g BID and monitor CBC closely.   -discussed with [facility] BMT service for potential transfer of the patient to BMT service, she was not considered a candidate for transfer at this time due to underlying multiple medical comorbidities and while awaiting further workup.  -Will plan for repeat Bone marrow biopsy tomorrow to further evaluate. This was discussed with pt in detail.  Bone marrow aspiration and biopsy ordered for today.  Reviewed with patient  Continue current treatment  Continue allopurinol      3. Hyperuricemia:  S. Uric acid 15.2 yesterday. started Rasburicase and allopurinol  Repeat Uric acid down to 6.7.   We will continue allopurinol at 200mg TID  Follow her levels    4 ACute on chronic anemia.  Multifactorial    5. Acute hypoxic respiratory insufficiency.  Likely secondary to congestive heart failure, pneumonia.    Cardiology and pulmonology are following.  Currently on 5 L of oxygen nasal cannula and diuresis with Lasix.  -discussed with pulmonology, Recommended against IV steroids given ongoing leukocytosis,         Meghann Lerma MD 10/07/24     Electronically signed by Meghann Lerma MD, 10/08/24, 8:18 AM EDT.

## 2024-10-08 LAB
ALBUMIN SERPL-MCNC: 3.6 G/DL (ref 3.5–5.2)
ALBUMIN/GLOB SERPL: 1 G/DL
ALP SERPL-CCNC: 275 U/L (ref 39–117)
ALT SERPL W P-5'-P-CCNC: 11 U/L (ref 1–33)
ANA SER QL IF: POSITIVE
ANA SPECKLED TITR SER: ABNORMAL {TITER}
ANION GAP SERPL CALCULATED.3IONS-SCNC: 10.1 MMOL/L (ref 5–15)
AST SERPL-CCNC: 21 U/L (ref 1–32)
BASOPHILS # BLD MANUAL: 0.87 10*3/MM3 (ref 0–0.2)
BASOPHILS NFR BLD MANUAL: 3 % (ref 0–1.5)
BILIRUB SERPL-MCNC: 0.4 MG/DL (ref 0–1.2)
BLASTS NFR BLD MANUAL: 16 % (ref 0–0)
BUN SERPL-MCNC: 39 MG/DL (ref 6–20)
BUN/CREAT SERPL: 43.3 (ref 7–25)
C-ANCA TITR SER IF: NORMAL TITER
C3 FRG RBC-MCNC: ABNORMAL
CALCIUM SPEC-SCNC: 8.8 MG/DL (ref 8.6–10.5)
CENTROMERE B AB SER-ACNC: <0.2 AI (ref 0–0.9)
CHLORIDE SERPL-SCNC: 107 MMOL/L (ref 98–107)
CHROMATIN AB SERPL-ACNC: <0.2 AI (ref 0–0.9)
CO2 SERPL-SCNC: 27.9 MMOL/L (ref 22–29)
CREAT SERPL-MCNC: 0.9 MG/DL (ref 0.57–1)
DEPRECATED RDW RBC AUTO: 60.6 FL (ref 37–54)
DSDNA AB SER-ACNC: 1 IU/ML (ref 0–9)
EGFRCR SERPLBLD CKD-EPI 2021: 79 ML/MIN/1.73
ENA JO1 AB SER-ACNC: <0.2 AI (ref 0–0.9)
ENA RNP AB SER-ACNC: 0.2 AI (ref 0–0.9)
ENA SCL70 AB SER-ACNC: <0.2 AI (ref 0–0.9)
ENA SM AB SER-ACNC: <0.2 AI (ref 0–0.9)
ENA SS-A AB SER-ACNC: <0.2 AI (ref 0–0.9)
ENA SS-B AB SER-ACNC: <0.2 AI (ref 0–0.9)
ERYTHROCYTE [DISTWIDTH] IN BLOOD BY AUTOMATED COUNT: 18.1 % (ref 12.3–15.4)
GLOBULIN UR ELPH-MCNC: 3.5 GM/DL
GLUCOSE BLDC GLUCOMTR-MCNC: 120 MG/DL (ref 70–105)
GLUCOSE BLDC GLUCOMTR-MCNC: 149 MG/DL (ref 70–105)
GLUCOSE BLDC GLUCOMTR-MCNC: 168 MG/DL (ref 70–105)
GLUCOSE BLDC GLUCOMTR-MCNC: 98 MG/DL (ref 70–105)
GLUCOSE SERPL-MCNC: 174 MG/DL (ref 65–99)
HCT VFR BLD AUTO: 24.8 % (ref 34–46.6)
HGB BLD-MCNC: 7.3 G/DL (ref 12–15.9)
LABORATORY COMMENT REPORT: ABNORMAL
LARGE PLATELETS: ABNORMAL
LYMPHOCYTES # BLD MANUAL: 13.35 10*3/MM3 (ref 0.7–3.1)
LYMPHOCYTES NFR BLD MANUAL: 2 % (ref 5–12)
Lab: ABNORMAL
Lab: ABNORMAL
Lab: NORMAL
Lab: NORMAL
MCH RBC QN AUTO: 27.2 PG (ref 26.6–33)
MCHC RBC AUTO-ENTMCNC: 29.4 G/DL (ref 31.5–35.7)
MCV RBC AUTO: 92.5 FL (ref 79–97)
METAMYELOCYTES NFR BLD MANUAL: 20 % (ref 0–0)
MONOCYTES # BLD: 0.58 10*3/MM3 (ref 0.1–0.9)
MYELOCYTES NFR BLD MANUAL: 1 % (ref 0–0)
MYELOPEROXIDASE AB SER IA-ACNC: <0.2 UNITS (ref 0–0.9)
NEUTROPHILS # BLD AUTO: 2.9 10*3/MM3 (ref 1.7–7)
NEUTROPHILS NFR BLD MANUAL: 8 % (ref 42.7–76)
NEUTS BAND NFR BLD MANUAL: 2 % (ref 0–5)
NRBC SPEC MANUAL: 3 /100 WBC (ref 0–0.2)
P-ANCA ATYPICAL TITR SER IF: NORMAL TITER
P-ANCA TITR SER IF: NORMAL TITER
PLATELET # BLD AUTO: 131 10*3/MM3 (ref 140–450)
PMV BLD AUTO: 9.6 FL (ref 6–12)
POIKILOCYTOSIS BLD QL SMEAR: ABNORMAL
POLYCHROMASIA BLD QL SMEAR: ABNORMAL
POTASSIUM SERPL-SCNC: 4.4 MMOL/L (ref 3.5–5.2)
PROMYELOCYTES NFR BLD MANUAL: 2 % (ref 0–0)
PROT SERPL-MCNC: 7.1 G/DL (ref 6–8.5)
PROTEINASE3 AB SER IA-ACNC: <0.2 UNITS (ref 0–0.9)
RBC # BLD AUTO: 2.68 10*6/MM3 (ref 3.77–5.28)
SCAN SLIDE: NORMAL
SMALL PLATELETS BLD QL SMEAR: ABNORMAL
SODIUM SERPL-SCNC: 145 MMOL/L (ref 136–145)
URATE SERPL-MCNC: 1.6 MG/DL (ref 2.4–5.7)
VARIANT LYMPHS NFR BLD MANUAL: 1 % (ref 0–5)
VARIANT LYMPHS NFR BLD MANUAL: 45 % (ref 19.6–45.3)
WBC MORPH BLD: NORMAL
WBC NRBC COR # BLD AUTO: 29.03 10*3/MM3 (ref 3.4–10.8)

## 2024-10-08 PROCEDURE — 82948 REAGENT STRIP/BLOOD GLUCOSE: CPT

## 2024-10-08 PROCEDURE — 80053 COMPREHEN METABOLIC PANEL: CPT | Performed by: NURSE PRACTITIONER

## 2024-10-08 PROCEDURE — 84550 ASSAY OF BLOOD/URIC ACID: CPT | Performed by: STUDENT IN AN ORGANIZED HEALTH CARE EDUCATION/TRAINING PROGRAM

## 2024-10-08 PROCEDURE — 99232 SBSQ HOSP IP/OBS MODERATE 35: CPT | Performed by: INTERNAL MEDICINE

## 2024-10-08 PROCEDURE — 85025 COMPLETE CBC W/AUTO DIFF WBC: CPT | Performed by: NURSE PRACTITIONER

## 2024-10-08 PROCEDURE — 82948 REAGENT STRIP/BLOOD GLUCOSE: CPT | Performed by: STUDENT IN AN ORGANIZED HEALTH CARE EDUCATION/TRAINING PROGRAM

## 2024-10-08 PROCEDURE — 63710000001 INSULIN GLARGINE PER 5 UNITS: Performed by: STUDENT IN AN ORGANIZED HEALTH CARE EDUCATION/TRAINING PROGRAM

## 2024-10-08 PROCEDURE — 25010000002 BUMETANIDE PER 0.5 MG: Performed by: HOSPITALIST

## 2024-10-08 PROCEDURE — 63710000001 INSULIN LISPRO (HUMAN) PER 5 UNITS: Performed by: STUDENT IN AN ORGANIZED HEALTH CARE EDUCATION/TRAINING PROGRAM

## 2024-10-08 PROCEDURE — 25010000002 FUROSEMIDE PER 20 MG: Performed by: INTERNAL MEDICINE

## 2024-10-08 PROCEDURE — 85007 BL SMEAR W/DIFF WBC COUNT: CPT | Performed by: NURSE PRACTITIONER

## 2024-10-08 RX ORDER — METOLAZONE 5 MG/1
5 TABLET ORAL ONCE
Status: COMPLETED | OUTPATIENT
Start: 2024-10-08 | End: 2024-10-08

## 2024-10-08 RX ORDER — BUMETANIDE 0.25 MG/ML
1 INJECTION INTRAMUSCULAR; INTRAVENOUS
Status: DISCONTINUED | OUTPATIENT
Start: 2024-10-08 | End: 2024-10-10 | Stop reason: HOSPADM

## 2024-10-08 RX ADMIN — ALLOPURINOL 200 MG: 100 TABLET ORAL at 17:53

## 2024-10-08 RX ADMIN — ALPRAZOLAM 0.5 MG: 0.5 TABLET ORAL at 20:19

## 2024-10-08 RX ADMIN — INSULIN LISPRO 7 UNITS: 100 INJECTION, SOLUTION INTRAVENOUS; SUBCUTANEOUS at 09:15

## 2024-10-08 RX ADMIN — INSULIN GLARGINE 28 UNITS: 100 INJECTION, SOLUTION SUBCUTANEOUS at 20:16

## 2024-10-08 RX ADMIN — HYDROXYUREA 1000 MG: 500 CAPSULE ORAL at 09:52

## 2024-10-08 RX ADMIN — BUMETANIDE 1 MG: 0.25 INJECTION INTRAMUSCULAR; INTRAVENOUS at 17:52

## 2024-10-08 RX ADMIN — AMOXICILLIN AND CLAVULANATE POTASSIUM 1 TABLET: 875; 125 TABLET, FILM COATED ORAL at 09:13

## 2024-10-08 RX ADMIN — METOLAZONE 5 MG: 5 TABLET ORAL at 13:18

## 2024-10-08 RX ADMIN — GABAPENTIN 800 MG: 400 CAPSULE ORAL at 13:33

## 2024-10-08 RX ADMIN — LOSARTAN POTASSIUM 12.5 MG: 25 TABLET, FILM COATED ORAL at 09:13

## 2024-10-08 RX ADMIN — ALLOPURINOL 200 MG: 100 TABLET ORAL at 09:13

## 2024-10-08 RX ADMIN — Medication 10 ML: at 09:15

## 2024-10-08 RX ADMIN — AMOXICILLIN AND CLAVULANATE POTASSIUM 1 TABLET: 875; 125 TABLET, FILM COATED ORAL at 20:15

## 2024-10-08 RX ADMIN — FUROSEMIDE 40 MG: 10 INJECTION, SOLUTION INTRAMUSCULAR; INTRAVENOUS at 05:57

## 2024-10-08 RX ADMIN — ALLOPURINOL 200 MG: 100 TABLET ORAL at 00:01

## 2024-10-08 RX ADMIN — ACETAMINOPHEN 650 MG: 325 TABLET, FILM COATED ORAL at 00:31

## 2024-10-08 RX ADMIN — HYDROXYUREA 1000 MG: 500 CAPSULE ORAL at 21:28

## 2024-10-08 RX ADMIN — GABAPENTIN 800 MG: 400 CAPSULE ORAL at 05:57

## 2024-10-08 RX ADMIN — Medication 10 ML: at 20:16

## 2024-10-08 RX ADMIN — INSULIN LISPRO 7 UNITS: 100 INJECTION, SOLUTION INTRAVENOUS; SUBCUTANEOUS at 13:18

## 2024-10-08 RX ADMIN — MIRTAZAPINE 15 MG: 15 TABLET, FILM COATED ORAL at 20:15

## 2024-10-08 RX ADMIN — METOPROLOL SUCCINATE 50 MG: 50 TABLET, EXTENDED RELEASE ORAL at 09:13

## 2024-10-08 RX ADMIN — INSULIN LISPRO 7 UNITS: 100 INJECTION, SOLUTION INTRAVENOUS; SUBCUTANEOUS at 17:53

## 2024-10-08 RX ADMIN — GABAPENTIN 800 MG: 400 CAPSULE ORAL at 20:15

## 2024-10-08 RX ADMIN — METOPROLOL SUCCINATE 50 MG: 50 TABLET, EXTENDED RELEASE ORAL at 20:15

## 2024-10-08 NOTE — PROGRESS NOTES
Daily Progress Note        Acute hypoxemic respiratory failure    CML (chronic myelocytic leukemia)    History of pulmonary embolism    Medically noncompliant    Type 2 diabetes mellitus with diabetic polyneuropathy, with long-term current use of insulin    Sinus tachycardia    NICM (nonischemic cardiomyopathy)    Anemia due to chemotherapy    Acute on chronic HFrEF (heart failure with reduced ejection fraction)    Assessment:        Hypoxic respiratory insufficiency  Streptococcus, alphahemolytic bacteremia on 9/27/2024     Diffuse bilateral groundglass opacities and alveolar infiltrates  Extract nuclear panel is negative  JOSE positive    Significant leukocytosis with 45% blasts  History of CML  Afebrile    Chronic elevated right hemidiaphragm  Hypertension     Expanded respiratory panel and Legionella and strep urine antigen negative        2D echo 10-24    Left ventricular systolic function is normal. Left ventricular ejection fraction appears to be 61 - 65%.    Left ventricular wall thickness is consistent with borderline concentric hypertrophy.    Left ventricular diastolic function was normal.    The right ventricular cavity is borderline dilated.    Estimated right ventricular systolic pressure from tricuspid regurgitation is mildly elevated (35-45 mmHg).    Mild pulmonary hypertension is present.        Recommendations:     ANCA panel pending  Augmentin  Blood cultures  Sputum cultures     Oxygen titration currently on 5 L    Gynecology following      Diuresis Lasix 20 mg po daily                    LOS: 11 days     Subjective         Objective     Vital signs for last 24 hours:  Vitals:    10/07/24 2042 10/07/24 2338 10/08/24 0400 10/08/24 0757   BP: 141/82 138/80 141/82 119/65   BP Location: Left arm   Left arm   Patient Position: Lying   Sitting   Pulse: 100 92 87 96   Resp: 18 16 14 12   Temp: 98.4 °F (36.9 °C) 98.6 °F (37 °C)  98 °F (36.7 °C)   TempSrc: Oral Oral  Oral   SpO2: 94% 98% 100% 97%   Weight:        Height:           Intake/Output last 3 shifts:  I/O last 3 completed shifts:  In: 120 [P.O.:120]  Out: -   Intake/Output this shift:  No intake/output data recorded.      Radiology  Imaging Results (Last 24 Hours)       Procedure Component Value Units Date/Time    CT Bone marrow biopsy and aspiration [[id number]] Collected: 10/07/24 1244     Updated: 10/07/24 1511    Narrative:      DATE OF EXAM:  10/7/2024 11:11 AM EDT    PROCEDURE:  CT BONE MARROW ASPIRATION AND BIOPSY    INDICATIONS:  CML    COMPARISON:  No Comparisons Available    FLUOROSCOPIC TIME:  4.5 seconds    PHYSICIAN MONITORED CONSCIOUS SEDATION TIME:  15 minutes    TECHNIQUE:   A detailed explanation of the procedure, including the risks, benefits, and alternatives was provided. Informed consent was obtained from the patient. A preprocedure timeout was performed. The interventional radiology nurse monitored the patient at all   times and provided conscious sedation. The patient was placed prone on the CT fluoroscopy table and the left  posterior superior iliac spine was marked and prepped and draped in the usual sterile fashion. The skin was anesthetized with 1% lidocaine.   Next, under CT fluoroscopic guidance an 11-gauge [employer] bone biopsy needle was advanced just deep to the bony cortex. 1 mL of bone marrow blood was aspirated and given to the cytopathology tech, who noted the presence of spicules. Next, a total of 7   mL of bone marrow aspirate was obtained and given to the tech. Finally, a biopsy was obtained using the 11-gauge needle. The needle was then removed. Hemostasis was achieved and a sterile bandage was applied. The patient tolerated the procedure well   without immediate complication. The patient was transferred back to the inpatient unit in stable condition.    FINDINGS:  See above      Impression:      Technically successful bone marrow aspiration and biopsy of the left posterior superior iliac spine utilizing CT fluoroscopic  guidance.      Report dictated by: Akira Killian DNP      I have personally reviewed this case and agree with the findings above:    Electronically Signed: Brady Dodd MD    10/7/2024 3:09 PM EDT    Workstation ID: [id number]            Labs:  Results from last 7 days   Lab Units 10/08/24  0219   WBC 10*3/mm3 29.03*   HEMOGLOBIN g/dL 7.3*   HEMATOCRIT % 24.8*   PLATELETS 10*3/mm3 131*     Results from last 7 days   Lab Units 10/08/24  0219   SODIUM mmol/L 145   POTASSIUM mmol/L 4.4   CHLORIDE mmol/L 107   CO2 mmol/L 27.9   BUN mg/dL 39*   CREATININE mg/dL 0.90   CALCIUM mg/dL 8.8   BILIRUBIN mg/dL 0.4   ALK PHOS U/L 275*   ALT (SGPT) U/L 11   AST (SGOT) U/L 21   GLUCOSE mg/dL 174*         Results from last 7 days   Lab Units 10/08/24  0219 10/07/24  0246 10/06/24  1211   ALBUMIN g/dL 3.6 3.7 3.8             Results from last 7 days   Lab Units 10/05/24  0518   MAGNESIUM mg/dL 1.8     Results from last 7 days   Lab Units 10/05/24  1538   INR  1.10   APTT seconds 24.5               Meds:   SCHEDULE  allopurinol, 200 mg, Oral, Q8H  amoxicillin-clavulanate, 1 tablet, Oral, Q12H  furosemide, 40 mg, Intravenous, BID  gabapentin, 800 mg, Oral, Q8H  hydroxyurea, 1,000 mg, Oral, BID  insulin glargine, 28 Units, Subcutaneous, Nightly  insulin lispro, 2-7 Units, Subcutaneous, 4x Daily AC & at Bedtime  insulin lispro, 7 Units, Subcutaneous, TID With Meals  losartan, 12.5 mg, Oral, Q24H  metoprolol succinate XL, 50 mg, Oral, Q12H  mirtazapine, 15 mg, Oral, Nightly  [Held by provider] rivaroxaban, 10 mg, Oral, Daily  sodium chloride, 10 mL, Intravenous, Q12H      Infusions  pain,       PRNs    acetaminophen **OR** acetaminophen **OR** acetaminophen    albuterol    ALPRAZolam    senna-docusate sodium **AND** polyethylene glycol **AND** bisacodyl **AND** bisacodyl    Calcium Replacement - Follow Nurse / BPA Driven Protocol    dextrose    dextrose    Diclofenac Sodium    glucagon (human recombinant)    hydrALAZINE    Magnesium Standard  Dose Replacement - Follow Nurse / BPA Driven Protocol    Morphine    ondansetron    Phosphorus Replacement - Follow Nurse / BPA Driven Protocol    Potassium Replacement - Follow Nurse / BPA Driven Protocol    [COMPLETED] Insert Peripheral IV **AND** sodium chloride    sodium chloride    sodium chloride    tiZANidine    Physical Exam:  Physical Exam  Cardiovascular:      Heart sounds: No murmur heard.     No gallop.   Pulmonary:      Effort: No respiratory distress.      Breath sounds: No stridor. Rhonchi and rales present. No wheezing.   Chest:      Chest wall: No tenderness.         ROS  Review of Systems   Respiratory:  Positive for cough and shortness of breath. Negative for wheezing and stridor.    Cardiovascular:  Positive for palpitations and leg swelling. Negative for chest pain.             Total time spent with patient greater than: 45 Minutes

## 2024-10-08 NOTE — PROGRESS NOTES
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 mild pulmonary hypertension  Technically difficult stud but IVC dilated at 2.4 cm  Patient has been noncompliant with medications and outpatient follow up.  High-sensitivity troponin of 17 and 22, proBNP of 4000  Previously proBNP was 12,000  Remains on IV diuretics.  Renal function is normal with sodium 145 now  Monitor I's and O's and replace electrolytes as needed  She is also on losartan, Toprol-XL  Resume Jardiance  Emphasized importance of compliance with medications and follow-up  Low-salt diet discussed with the patient     Acute respiratory failure with hypoxia  Likely multifactorial, secondary to pneumonia, CHF, and anemia   Completed antibiotics  Remains on 5 L of oxygen  Wean as tolerated  Pulmonology is comanaging     Anemia due to chemotherapy  H&H 7.3/24.8  Xarelto is on hold per hematology  May need another blood transfusion     CML (chronic myelocytic leukemia)  On chemotherapy, followed by oncology   White count is 29,000  Suspicion of blast crisis.  Started on hydroxyurea  Completed bone marrow biopsy     Medically noncompliant  Compliance encouraged  /  consulted   HF Nurse Navigator consulted as above     Type 2 diabetes mellitus with diabetic polyneuropathy, with long-term current use of insulin  A1c of 8.2  On Lantus and SSI     Sinus tachycardia  Due to underlying infection, anemia and hypoxia  Improving with treatment of underlying issues  Continue Toprol-XL 50 mg twice daily  Heart rate has improved      Rohan Jackson MD  10/08/24  08:20 EDT

## 2024-10-08 NOTE — PLAN OF CARE
Goal Outcome Evaluation:           Problem: Adult Inpatient Plan of Care  Goal: Absence of Hospital-Acquired Illness or Injury  Intervention: Identify and Manage Fall Risk  Intervention: Prevent Skin Injury  Intervention: Prevent and Manage VTE (Venous Thromboembolism) Risk  Goal: Optimal Comfort and Wellbeing  Intervention: Monitor Pain and Promote Comfort  Intervention: Provide Person-Centered Care     Problem: Diabetes Comorbidity  Goal: Blood Glucose Level Within Targeted Range  Intervention: Monitor and Manage Glycemia     Problem: Heart Failure Comorbidity  Goal: Maintenance of Heart Failure Symptom Control  Intervention: Maintain Heart Failure-Management     Problem: Pain Chronic (Persistent) (Comorbidity Management)  Goal: Acceptable Pain Control and Functional Ability  Intervention: Manage Persistent Pain  Intervention: Develop Pain Management Plan  Intervention: Optimize Psychosocial Wellbeing     Problem: Skin Injury Risk Increased  Goal: Skin Health and Integrity  Intervention: Optimize Skin Protection     Problem: Fall Injury Risk  Goal: Absence of Fall and Fall-Related Injury  Intervention: Identify and Manage Contributors  Intervention: Promote Injury-Free Environment     Problem: Adjustment to Illness (Sepsis/Septic Shock)  Goal: Optimal Coping  Intervention: Optimize Psychosocial Adjustment to Illness     Problem: Glycemic Control Impaired (Sepsis/Septic Shock)  Goal: Blood Glucose Level Within Desired Range  Intervention: Optimize Glycemic Control     Problem: Infection Progression (Sepsis/Septic Shock)  Goal: Absence of Infection Signs and Symptoms  Intervention: Promote Recovery  Intervention: Promote Stabilization      Patient A&O diuretic changed from lasix to bumex. Waiting on biopsy results. Call light and bedside table within reach.

## 2024-10-08 NOTE — PLAN OF CARE
Goal Outcome Evaluation:  Pt has not rested well, more anxious than normal.vss, c/o pain but only wanted tylenol, which was given. Call light within reach, poc ongoing.

## 2024-10-08 NOTE — PROGRESS NOTES
Hematology/Oncology Inpatient Progress Note    PATIENT NAME: Nieves Mc  : 1975  MRN: [id number]    CHIEF COMPLAINT: Hypoxia    HISTORY OF PRESENT ILLNESS:      Nieves Mc is a 48 y.o. female who presented to [facility] on 2024 with complaints of shortness of breath.  She was found to hypoxic with SpO2 76% while undergoing evaluation for blood transfusion.  She was subsequently sent to the ED for further evaluation.  She does report bilateral leg swelling.  In the ED she was saturating 86% and requiring 3 L nasal cannula to maintain PaO2 over 90%.  She was mildly tachycardic, hypertensive and afebrile.  Chest x-ray completed showed patchy right greater than left airspace opacities.  CBC was notable for neutropenia, anemia.  She was started on IV antibiotics.  Patient is now needing 5 L oxygen.  Cardiology and pulmonology have been consulted.  It appears she has been noncompliant with her cardiac medications and her cardiology outpatient follow-up.  Interval chest x-ray on 10/1/2024 showed stable patchy multifocal airspace infiltrates interstitial changes throughout the lungs bilaterally.  She had duplex ultrasound bilateral lower extremities due to swelling and this was normal.      10/04/24  Hematology/Oncology was consulted.  She is established with Dr. Lerma for CML.  She is currently on treatment with ponatinib, but most recent office visit with Dr. Lerma she had stated she has been noncompliant over the last 3 to 4 weeks.  She was encouraged at that visit to restart her treatment.    10/5/24: Pt seen today for follow up. Reported that her shortness of breath has improved. However continues to be hypoxic off Oxygen support. She is currently on IV antibiotics.    Subjective     Patient denies any new issues.    ROS:  Review of Systems   Constitutional:  Positive for fatigue.   HENT: Negative.     Eyes: Negative.    Respiratory:  Positive for shortness of breath.   "  Cardiovascular: Negative.    Gastrointestinal: Negative.    Endocrine: Negative.    Genitourinary: Negative.    Musculoskeletal: Negative.    Skin: Negative.    Allergic/Immunologic: Negative.    Neurological: Negative.    Hematological: Negative.    Psychiatric/Behavioral: Negative.          MEDICATIONS:    Scheduled Meds:  allopurinol, 200 mg, Oral, Q8H  amoxicillin-clavulanate, 1 tablet, Oral, Q12H  furosemide, 40 mg, Intravenous, BID  gabapentin, 800 mg, Oral, Q8H  hydroxyurea, 1,000 mg, Oral, BID  insulin glargine, 28 Units, Subcutaneous, Nightly  insulin lispro, 2-7 Units, Subcutaneous, 4x Daily AC & at Bedtime  insulin lispro, 7 Units, Subcutaneous, TID With Meals  losartan, 12.5 mg, Oral, Q24H  metoprolol succinate XL, 50 mg, Oral, Q12H  mirtazapine, 15 mg, Oral, Nightly  [Held by provider] rivaroxaban, 10 mg, Oral, Daily  sodium chloride, 10 mL, Intravenous, Q12H       Continuous Infusions:  pain,        PRN Meds:    acetaminophen **OR** acetaminophen **OR** acetaminophen    albuterol    ALPRAZolam    senna-docusate sodium **AND** polyethylene glycol **AND** bisacodyl **AND** bisacodyl    Calcium Replacement - Follow Nurse / BPA Driven Protocol    dextrose    dextrose    Diclofenac Sodium    glucagon (human recombinant)    hydrALAZINE    Magnesium Standard Dose Replacement - Follow Nurse / BPA Driven Protocol    Morphine    ondansetron    Phosphorus Replacement - Follow Nurse / BPA Driven Protocol    Potassium Replacement - Follow Nurse / BPA Driven Protocol    [COMPLETED] Insert Peripheral IV **AND** sodium chloride    sodium chloride    sodium chloride    tiZANidine     ALLERGIES:    Allergies   Allergen Reactions    Contrast Dye (Echo Or Unknown Ct/Mr) Shortness Of Breath and Nausea And Vomiting     Flushed        Objective    VITALS:   /65 (BP Location: Left arm, Patient Position: Sitting)   Pulse 96   Temp 98 °F (36.7 °C) (Oral)   Resp 12   Ht 162.6 cm (64\")   Wt 89.4 kg (197 lb)   LMP " 05/25/2022 (Approximate)   SpO2 97%   BMI 33.81 kg/m²     PHYSICAL EXAM: (performed by MD)  Physical Exam  Constitutional:       Appearance: She is normal weight. She is ill-appearing.   HENT:      Head: Normocephalic and atraumatic.      Right Ear: External ear normal.      Left Ear: External ear normal.      Nose: Nose normal.      Mouth/Throat:      Mouth: Mucous membranes are moist.      Pharynx: Oropharynx is clear.   Eyes:      Extraocular Movements: Extraocular movements intact.      Conjunctiva/sclera: Conjunctivae normal.      Pupils: Pupils are equal, round, and reactive to light.   Cardiovascular:      Rate and Rhythm: Normal rate.      Pulses: Normal pulses.   Pulmonary:      Effort: Pulmonary effort is normal.   Abdominal:      General: Abdomen is flat.      Palpations: Abdomen is soft.   Musculoskeletal:         General: Normal range of motion.      Cervical back: Normal range of motion and neck supple.   Skin:     General: Skin is warm.   Neurological:      Mental Status: She is alert.   Psychiatric:         Mood and Affect: Mood normal.         Behavior: Behavior normal.         Thought Content: Thought content normal.         Judgment: Judgment normal.       I have reexamined the patient and the results are consistent with the previously documented exam. Meghann Lerma MD       RECENT LABS:    Lab Results (last 24 hours)       Procedure Component Value Units Date/Time    POC Glucose 4x Daily Before Meals & at Bedtime [[id number]]  (Abnormal) Collected: 10/08/24 0700    Specimen: Blood Updated: 10/08/24 0703     Glucose 149 mg/dL      Comment: Serial Number: [id number]:  [id number]       Manual Differential [[id number]]  (Abnormal) Collected: 10/08/24 0219    Specimen: Blood from Arm, Left Updated: 10/08/24 0332     Neutrophil % 8.0 %      Lymphocyte % 45.0 %      Monocyte % 2.0 %      Basophil % 3.0 %      Bands %  2.0 %      Metamyelocyte % 20.0 %      Myelocyte % 1.0 %       Promyelocyte % 2.0 %      Atypical Lymphocyte % 1.0 %      Blasts % 16.0 %      Neutrophils Absolute 2.90 10*3/mm3      Lymphocytes Absolute 13.35 10*3/mm3      Monocytes Absolute 0.58 10*3/mm3      Basophils Absolute 0.87 10*3/mm3      nRBC 3.0 /100 WBC      Poikilocytes Slight/1+     Polychromasia Slight/1+     RBC Fragments Slight/1+     WBC Morphology Normal     Platelet Estimate Decreased     Large Platelets Slight/1+    Narrative:      Reviewed by Pathologist within the past 30 days on 10/5/24...10/8/24 fauzia .      CBC & Differential [[id number]]  (Abnormal) Collected: 10/08/24 0219    Specimen: Blood from Arm, Left Updated: 10/08/24 0332    Narrative:      The following orders were created for panel order CBC & Differential.  Procedure                               Abnormality         Status                     ---------                               -----------         ------                     CBC Auto Differential[[id number]]        Abnormal            Edited Result - FINAL      Scan Slide[[id number]]                                       Edited Result - FINAL        Please view results for these tests on the individual orders.    CBC Auto Differential [[id number]]  (Abnormal) Collected: 10/08/24 0219    Specimen: Blood from Arm, Left Updated: 10/08/24 0332     WBC 29.03 10*3/mm3      RBC 2.68 10*6/mm3      Hemoglobin 7.3 g/dL      Hematocrit 24.8 %      MCV 92.5 fL      MCH 27.2 pg      MCHC 29.4 g/dL      RDW 18.1 %      RDW-SD 60.6 fl      MPV 9.6 fL      Platelets 131 10*3/mm3     Scan Slide [[id number]] Collected: 10/08/24 0219    Specimen: Blood from Arm, Left Updated: 10/08/24 0332     Scan Slide --     Comment: See Manual Differential Results       Comprehensive Metabolic Panel [[id number]]  (Abnormal) Collected: 10/08/24 0219    Specimen: Blood from Arm, Left Updated: 10/08/24 0318     Glucose 174 mg/dL      BUN 39 mg/dL      Creatinine 0.90 mg/dL      Sodium 145 mmol/L      Potassium 4.4 mmol/L       Chloride 107 mmol/L      CO2 27.9 mmol/L      Calcium 8.8 mg/dL      Total Protein 7.1 g/dL      Albumin 3.6 g/dL      ALT (SGPT) 11 U/L      AST (SGOT) 21 U/L      Alkaline Phosphatase 275 U/L      Total Bilirubin 0.4 mg/dL      Globulin 3.5 gm/dL      A/G Ratio 1.0 g/dL      BUN/Creatinine Ratio 43.3     Anion Gap 10.1 mmol/L      eGFR 79.0 mL/min/1.73     Narrative:      GFR Normal >60  Chronic Kidney Disease <60  Kidney Failure <15      Uric Acid [[id number]]  (Abnormal) Collected: 10/08/24 0219    Specimen: Blood from Arm, Left Updated: 10/08/24 0318     Uric Acid 1.6 mg/dL     POC Glucose Once [[id number]]  (Abnormal) Collected: 10/07/24 2113    Specimen: Blood Updated: 10/07/24 2115     Glucose 156 mg/dL      Comment: Serial Number: [id number]:  [id number]       Extractable Nuclear Antigen Antibodies [[id number]] Collected: 10/05/24 1329    Specimen: Blood from Arm, Right Updated: 10/07/24 1809     RNP Antibodies 0.2 AI      [place] Antibodies <0.2 AI     Narrative:      Performed at:  [address] [place] [address]  [id number]  : Robbin Ladd PhD, Phone:  [phone]    POC Glucose Once [[id number]]  (Abnormal) Collected: 10/07/24 1657    Specimen: Blood Updated: 10/07/24 1658     Glucose 196 mg/dL      Comment: Serial Number: [id number]:  [id number]       POC Glucose Once [[id number]]  (Abnormal) Collected: 10/07/24 1325    Specimen: Blood Updated: 10/07/24 1327     Glucose 175 mg/dL      Comment: Serial Number: [id number]:  [id number]       [employer] ([facility]) [[id number]] Collected: 10/07/24 1309    Specimen: Bone Marrow Updated: 10/07/24 1309    Flow Cytometry [[id number]] Collected: 10/07/24 1148    Specimen: Bone Marrow from Iliac Updated: 10/07/24 1309    Bone Marrow Exam [[id number]] Collected: 10/07/24 1148    Specimen: Bone Marrow Aspirate from Iliac Crest, Left - Biopsy Updated: 10/07/24 1243    Narrative:      The following orders were  created for panel order Bone Marrow Exam.  Procedure                               Abnormality         Status                     ---------                               -----------         ------                     Bone Marrow Exam[[id number]]                                 In process                   Please view results for these tests on the individual orders.    Bone Marrow Exam [[id number]] Collected: 10/07/24 1148    Specimen: Bone Marrow Aspirate from Iliac Crest, Left - Aspirate; Bone Marrow Aspirate from Iliac Crest, Left - Aspirate; Bone Marrow Aspirate from Iliac Crest, Left - Biopsy Updated: 10/07/24 1243    POC Glucose 4x Daily Before Meals & at Bedtime [[id number]]  (Abnormal) Collected: 10/07/24 1223    Specimen: Blood Updated: 10/07/24 1225     Glucose 146 mg/dL      Comment: Serial Number: [id number]:  [id number]       Blood Culture - Blood, Hand, Left [[id number]]  (Normal) Collected: 10/04/24 1111    Specimen: Blood from Hand, Left Updated: 10/07/24 1130     Blood Culture No growth at 3 days    Blood Culture - Blood, Hand, Right [[id number]]  (Normal) Collected: 10/04/24 1111    Specimen: Blood from Hand, Right Updated: 10/07/24 1130     Blood Culture No growth at 3 days    Pathology Consultation [[id number]] Collected: 10/05/24 0518    Specimen: Blood, Venous Line Updated: 10/07/24 0954     Final Diagnosis --     Leukocytosis with left shifted granulocytes and scattered blasts (less than 20%)  Anemia  Patient with known chronic myelocytic leukemia       Case Report --     Surgical Pathology Report                         Case: [id number]                                  Authorizing Provider:  Esther Tenorio PA-C Collected:           10/05/2024 05:18 AM          Ordering Location:     [address]    Received:            10/07/2024 06:55 AM          Pathologist:           Amador Jackson MD                                                            Specimen:    Blood, Venous  Line                                                                                 PENDING RESULTS:     IMAGING REVIEWED:  CT Bone marrow biopsy and aspiration    Result Date: 10/7/2024  Technically successful bone marrow aspiration and biopsy of the left posterior superior iliac spine utilizing CT fluoroscopic guidance. Report dictated by: Akira Killian DNP  I have personally reviewed this case and agree with the findings above: Electronically Signed: Brady Dodd MD  10/7/2024 3:09 PM EDT  Workstation ID: [id number]     Assessment & Plan :      ASSESSMENT:    Accelerated phase CML:    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   -As per chart review, She has not been compliant and was encouraged to restart at last visit 9/26/2024.  Although patient disputed this and stated that she has been compliant except for last 1-2 weeks.  -Ponatinib on hold for now.   -labs reviewed, Noted to have uptrending Blast count and immature forms, 45% blasts noted.  -Coag parameters normal. S. Mg and P levels WNL. Renal function is stable.  -Fibrinogen levels are sent, pending. Flow Cytometry is pending.  -overall picture is concerning for transformation to blast crisis given >30% blasts in peripheral circulation.  -Will plan to start her on Rasburicase 6mg Daily,  -She has low Hb 7.3 and Plt count 157, Will start at Hydrea 1g BID and monitor CBC closely.   -discussed with [facility] BMT service for potential transfer of the patient to BMT service, she was not considered a candidate for transfer at this time due to underlying multiple medical comorbidities and while awaiting further workup.  -Will plan for repeat Bone marrow biopsy tomorrow to further evaluate. This was discussed with pt in detail.  Bone marrow aspiration and biopsy ordered for today.  Reviewed with patient  Continue current treatment  Continue allopurinol    Status post bone  marrow aspiration and biopsy which confirms blast phase of CML at 47%.  Recommend transfer to [facility] bone marrow unit for high-dose chemo.  Patient at this point in hesitating she will think about it and discuss further with her family and then make decision      3. Hyperuricemia:  S. Uric acid 15.2 yesterday. started Rasburicase and allopurinol  Repeat Uric acid down to 6.7.   Continue allopurinol at 200mg TID  Monitor    4 ACute on chronic anemia.  Multifactorial    5. Acute hypoxic respiratory insufficiency.  Likely secondary to congestive heart failure, pneumonia.    Cardiology and pulmonology are following.  Currently on 5 L of oxygen nasal cannula and diuresis with Lasix.  -discussed with pulmonology, Recommended against IV steroids given ongoing leukocytosis,        Electronically signed by Meghann Lerma MD, 10/09/24, 6:38 PM EDT.

## 2024-10-08 NOTE — PROGRESS NOTES
[facility] MEDICINE SERVICE  DAILY PROGRESS NOTE    NAME: Nieves Mc  : 1975  MRN: [id number]      LOS: 11 days     PROVIDER OF SERVICE: Timothy Duane Brammell, MD    Chief Complaint: Acute hypoxemic respiratory failure    Subjective:     Interval History:  History taken from: patient    Patient with little change in her shortness of breath.  Still on her 5/L nasal cannula oxygen.  Denies any sputum production.  Still has her lower extremity edema.  Eating poorly.  Denies any bowel issues.  Denies any other additional acute issues.        Review of Systems:   Review of Systems   All other systems reviewed and are negative.      Objective:     Vital Signs  Temp:  [97.9 °F (36.6 °C)-98.6 °F (37 °C)] 98 °F (36.7 °C)  Heart Rate:  [] 96  Resp:  [12-18] 12  BP: (119-141)/(65-98) 119/65  Flow (L/min):  [5] 5   Body mass index is 33.81 kg/m².    Physical Exam  Physical Exam  Vitals reviewed.   Constitutional:       Appearance: She is obese.   HENT:      Head: Normocephalic.   Cardiovascular:      Rate and Rhythm: Normal rate and regular rhythm.   Pulmonary:      Effort: Pulmonary effort is normal.      Breath sounds: Normal breath sounds.   Abdominal:      Palpations: Abdomen is soft.      Tenderness: There is no abdominal tenderness.   Musculoskeletal:         General: Swelling present.      Comments: 2-3+ edema   Neurological:      Mental Status: She is alert. Mental status is at baseline.            Diagnostic Data    Results from last 7 days   Lab Units 10/08/24  0219   WBC 10*3/mm3 29.03*   HEMOGLOBIN g/dL 7.3*   HEMATOCRIT % 24.8*   PLATELETS 10*3/mm3 131*   GLUCOSE mg/dL 174*   CREATININE mg/dL 0.90   BUN mg/dL 39*   SODIUM mmol/L 145   POTASSIUM mmol/L 4.4   AST (SGOT) U/L 21   ALT (SGPT) U/L 11   ALK PHOS U/L 275*   BILIRUBIN mg/dL 0.4   ANION GAP mmol/L 10.1       CT Bone marrow biopsy and aspiration    Result Date: 10/7/2024  Technically successful bone marrow aspiration and biopsy of  the left posterior superior iliac spine utilizing CT fluoroscopic guidance. Report dictated by: Akira Killian DNP  I have personally reviewed this case and agree with the findings above: Electronically Signed: Brady Dodd MD  10/7/2024 3:09 PM EDT  Workstation ID: [id number]           Assessment:   Acute hypoxemic respiratory failure  Community-acquired multifocal pneumonia  Acute on chronic diastolic congestive heart failure  Chronic myelogenous leukemia with blasts  Insulin requiring diabetes Type 2  Chronic anxiety  Medical noncompliance  History of pulmonary emboli  Chronic anticoagulation on hold at present await bone marrow bx  Strep bacteremia  anemia       Plan.  Results of bone marrow biopsy pending.  Adjust diuretics to aid in diuresis.  Cardiology and pulmonary following with oncology.  Completing course of oral antibiotics.  Follow-up labs.        Active and Resolved Problems  Active Hospital Problems    Diagnosis  POA    **Acute hypoxemic respiratory failure [J96.01]  Yes    Acute on chronic HFrEF (heart failure with reduced ejection fraction) [I50.23]  Yes    Sinus tachycardia [R00.0]  Yes    Anemia due to chemotherapy [D64.81, T45.1X5A]  Yes    NICM (nonischemic cardiomyopathy) [I42.8]  Yes    Type 2 diabetes mellitus with diabetic polyneuropathy, with long-term current use of insulin [E11.42, Z79.4]  Not Applicable    Medically noncompliant [Z91.199]  Not Applicable    History of pulmonary embolism [Z86.711]  Yes    CML (chronic myelocytic leukemia) [C92.10]  Yes      Resolved Hospital Problems   No resolved problems to display.           VTE Prophylaxis:  Pharmacologic VTE prophylaxis orders are present.             Disposition Planning:     Barriers to Discharge:improvement in edema and O2 needs and  Anticipated Date of Discharge: 10/11  Place of Discharge: home      Time: 30 minutes     Code Status and Medical Interventions: CPR (Attempt to Resuscitate); Full Support   Ordered at: 09/28/24 0443      Code Status (Patient has no pulse and is not breathing):    CPR (Attempt to Resuscitate)     Medical Interventions (Patient has pulse or is breathing):    Full Support       Signature: Electronically signed by Timothy Duane Brammell, MD, 10/08/24, 11:43 EDT.  [facility] Hospitalist Team

## 2024-10-09 LAB
ABO GROUP BLD: NORMAL
ALBUMIN SERPL-MCNC: 3.5 G/DL (ref 3.5–5.2)
ALBUMIN/GLOB SERPL: 1.1 G/DL
ALP SERPL-CCNC: 250 U/L (ref 39–117)
ALT SERPL W P-5'-P-CCNC: 10 U/L (ref 1–33)
ANION GAP SERPL CALCULATED.3IONS-SCNC: 8.7 MMOL/L (ref 5–15)
ANISOCYTOSIS BLD QL: ABNORMAL
AST SERPL-CCNC: 16 U/L (ref 1–32)
BACTERIA SPEC AEROBE CULT: NORMAL
BACTERIA SPEC AEROBE CULT: NORMAL
BASOPHILS # BLD MANUAL: 0.15 10*3/MM3 (ref 0–0.2)
BASOPHILS NFR BLD MANUAL: 1 % (ref 0–1.5)
BCR ABL RESULT: NORMAL
BILIRUB SERPL-MCNC: 0.4 MG/DL (ref 0–1.2)
BLASTS NFR BLD MANUAL: 22 % (ref 0–0)
BLD GP AB SCN SERPL QL: NEGATIVE
BUN SERPL-MCNC: 41 MG/DL (ref 6–20)
BUN/CREAT SERPL: 51.3 (ref 7–25)
CALCIUM SPEC-SCNC: 8.4 MG/DL (ref 8.6–10.5)
CHLORIDE SERPL-SCNC: 107 MMOL/L (ref 98–107)
CO2 SERPL-SCNC: 29.3 MMOL/L (ref 22–29)
CREAT SERPL-MCNC: 0.8 MG/DL (ref 0.57–1)
DEPRECATED RDW RBC AUTO: 61 FL (ref 37–54)
EGFRCR SERPLBLD CKD-EPI 2021: 91 ML/MIN/1.73
EOSINOPHIL # BLD MANUAL: 0.15 10*3/MM3 (ref 0–0.4)
EOSINOPHIL NFR BLD MANUAL: 1 % (ref 0.3–6.2)
ERYTHROCYTE [DISTWIDTH] IN BLOOD BY AUTOMATED COUNT: 17.8 % (ref 12.3–15.4)
GLOBULIN UR ELPH-MCNC: 3.3 GM/DL
GLUCOSE BLDC GLUCOMTR-MCNC: 137 MG/DL (ref 70–105)
GLUCOSE BLDC GLUCOMTR-MCNC: 144 MG/DL (ref 70–105)
GLUCOSE BLDC GLUCOMTR-MCNC: 152 MG/DL (ref 70–105)
GLUCOSE BLDC GLUCOMTR-MCNC: 199 MG/DL (ref 70–105)
GLUCOSE SERPL-MCNC: 158 MG/DL (ref 65–99)
HCT VFR BLD AUTO: 24.2 % (ref 34–46.6)
HGB BLD-MCNC: 7.1 G/DL (ref 12–15.9)
LYMPHOCYTES # BLD MANUAL: 6.12 10*3/MM3 (ref 0.7–3.1)
LYMPHOCYTES NFR BLD MANUAL: 8 % (ref 5–12)
MCH RBC QN AUTO: 27.3 PG (ref 26.6–33)
MCHC RBC AUTO-ENTMCNC: 29.3 G/DL (ref 31.5–35.7)
MCV RBC AUTO: 93.1 FL (ref 79–97)
METAMYELOCYTES NFR BLD MANUAL: 5 % (ref 0–0)
MONOCYTES # BLD: 1.19 10*3/MM3 (ref 0.1–0.9)
MYELOCYTES NFR BLD MANUAL: 5 % (ref 0–0)
NEUTROPHILS # BLD AUTO: 2.54 10*3/MM3 (ref 1.7–7)
NEUTROPHILS NFR BLD MANUAL: 15 % (ref 42.7–76)
NEUTS BAND NFR BLD MANUAL: 2 % (ref 0–5)
NRBC SPEC MANUAL: 2 /100 WBC (ref 0–0.2)
PLATELET # BLD AUTO: 101 10*3/MM3 (ref 140–450)
PMV BLD AUTO: 9.4 FL (ref 6–12)
POTASSIUM SERPL-SCNC: 4.7 MMOL/L (ref 3.5–5.2)
PROT SERPL-MCNC: 6.8 G/DL (ref 6–8.5)
RBC # BLD AUTO: 2.6 10*6/MM3 (ref 3.77–5.28)
RH BLD: POSITIVE
SCAN SLIDE: NORMAL
SMALL PLATELETS BLD QL SMEAR: ABNORMAL
SODIUM SERPL-SCNC: 145 MMOL/L (ref 136–145)
T&S EXPIRATION DATE: NORMAL
URATE SERPL-MCNC: 1.7 MG/DL (ref 2.4–5.7)
VARIANT LYMPHS NFR BLD MANUAL: 41 % (ref 19.6–45.3)
WBC MORPH BLD: NORMAL
WBC NRBC COR # BLD AUTO: 14.93 10*3/MM3 (ref 3.4–10.8)

## 2024-10-09 PROCEDURE — 36430 TRANSFUSION BLD/BLD COMPNT: CPT

## 2024-10-09 PROCEDURE — 99232 SBSQ HOSP IP/OBS MODERATE 35: CPT | Performed by: INTERNAL MEDICINE

## 2024-10-09 PROCEDURE — 82948 REAGENT STRIP/BLOOD GLUCOSE: CPT | Performed by: STUDENT IN AN ORGANIZED HEALTH CARE EDUCATION/TRAINING PROGRAM

## 2024-10-09 PROCEDURE — P9040 RBC LEUKOREDUCED IRRADIATED: HCPCS

## 2024-10-09 PROCEDURE — 82948 REAGENT STRIP/BLOOD GLUCOSE: CPT

## 2024-10-09 PROCEDURE — 86901 BLOOD TYPING SEROLOGIC RH(D): CPT | Performed by: PHYSICIAN ASSISTANT

## 2024-10-09 PROCEDURE — 84550 ASSAY OF BLOOD/URIC ACID: CPT | Performed by: STUDENT IN AN ORGANIZED HEALTH CARE EDUCATION/TRAINING PROGRAM

## 2024-10-09 PROCEDURE — 85007 BL SMEAR W/DIFF WBC COUNT: CPT | Performed by: NURSE PRACTITIONER

## 2024-10-09 PROCEDURE — 86900 BLOOD TYPING SEROLOGIC ABO: CPT | Performed by: PHYSICIAN ASSISTANT

## 2024-10-09 PROCEDURE — 80053 COMPREHEN METABOLIC PANEL: CPT | Performed by: NURSE PRACTITIONER

## 2024-10-09 PROCEDURE — 25010000002 BUMETANIDE PER 0.5 MG: Performed by: HOSPITALIST

## 2024-10-09 PROCEDURE — 86922 COMPATIBILITY TEST ANTIGLOB: CPT

## 2024-10-09 PROCEDURE — 85025 COMPLETE CBC W/AUTO DIFF WBC: CPT | Performed by: NURSE PRACTITIONER

## 2024-10-09 PROCEDURE — 63710000001 INSULIN LISPRO (HUMAN) PER 5 UNITS: Performed by: STUDENT IN AN ORGANIZED HEALTH CARE EDUCATION/TRAINING PROGRAM

## 2024-10-09 PROCEDURE — 63710000001 INSULIN GLARGINE PER 5 UNITS: Performed by: STUDENT IN AN ORGANIZED HEALTH CARE EDUCATION/TRAINING PROGRAM

## 2024-10-09 PROCEDURE — 86850 RBC ANTIBODY SCREEN: CPT | Performed by: PHYSICIAN ASSISTANT

## 2024-10-09 PROCEDURE — 86900 BLOOD TYPING SEROLOGIC ABO: CPT

## 2024-10-09 RX ADMIN — INSULIN LISPRO 7 UNITS: 100 INJECTION, SOLUTION INTRAVENOUS; SUBCUTANEOUS at 18:01

## 2024-10-09 RX ADMIN — BUMETANIDE 1 MG: 0.25 INJECTION INTRAMUSCULAR; INTRAVENOUS at 21:55

## 2024-10-09 RX ADMIN — INSULIN LISPRO 7 UNITS: 100 INJECTION, SOLUTION INTRAVENOUS; SUBCUTANEOUS at 14:15

## 2024-10-09 RX ADMIN — INSULIN GLARGINE 28 UNITS: 100 INJECTION, SOLUTION SUBCUTANEOUS at 21:54

## 2024-10-09 RX ADMIN — ALLOPURINOL 200 MG: 100 TABLET ORAL at 18:01

## 2024-10-09 RX ADMIN — MIRTAZAPINE 15 MG: 15 TABLET, FILM COATED ORAL at 21:55

## 2024-10-09 RX ADMIN — ALLOPURINOL 200 MG: 100 TABLET ORAL at 09:19

## 2024-10-09 RX ADMIN — METOPROLOL SUCCINATE 50 MG: 50 TABLET, EXTENDED RELEASE ORAL at 21:55

## 2024-10-09 RX ADMIN — BUMETANIDE 1 MG: 0.25 INJECTION INTRAMUSCULAR; INTRAVENOUS at 09:19

## 2024-10-09 RX ADMIN — METOPROLOL SUCCINATE 50 MG: 50 TABLET, EXTENDED RELEASE ORAL at 09:19

## 2024-10-09 RX ADMIN — Medication 10 ML: at 09:20

## 2024-10-09 RX ADMIN — GABAPENTIN 800 MG: 400 CAPSULE ORAL at 14:15

## 2024-10-09 RX ADMIN — LOSARTAN POTASSIUM 12.5 MG: 25 TABLET, FILM COATED ORAL at 09:19

## 2024-10-09 RX ADMIN — GABAPENTIN 800 MG: 400 CAPSULE ORAL at 05:29

## 2024-10-09 RX ADMIN — Medication 10 ML: at 21:56

## 2024-10-09 RX ADMIN — HYDROXYUREA 1000 MG: 500 CAPSULE ORAL at 22:12

## 2024-10-09 RX ADMIN — INSULIN LISPRO 2 UNITS: 100 INJECTION, SOLUTION INTRAVENOUS; SUBCUTANEOUS at 09:20

## 2024-10-09 RX ADMIN — INSULIN LISPRO 2 UNITS: 100 INJECTION, SOLUTION INTRAVENOUS; SUBCUTANEOUS at 21:54

## 2024-10-09 RX ADMIN — INSULIN LISPRO 7 UNITS: 100 INJECTION, SOLUTION INTRAVENOUS; SUBCUTANEOUS at 09:19

## 2024-10-09 RX ADMIN — GABAPENTIN 800 MG: 400 CAPSULE ORAL at 21:55

## 2024-10-09 RX ADMIN — HYDROXYUREA 1000 MG: 500 CAPSULE ORAL at 10:40

## 2024-10-09 RX ADMIN — ALPRAZOLAM 0.5 MG: 0.5 TABLET ORAL at 21:55

## 2024-10-09 RX ADMIN — ALLOPURINOL 200 MG: 100 TABLET ORAL at 02:30

## 2024-10-09 RX ADMIN — AMOXICILLIN AND CLAVULANATE POTASSIUM 1 TABLET: 875; 125 TABLET, FILM COATED ORAL at 21:55

## 2024-10-09 RX ADMIN — AMOXICILLIN AND CLAVULANATE POTASSIUM 1 TABLET: 875; 125 TABLET, FILM COATED ORAL at 09:19

## 2024-10-09 NOTE — PROGRESS NOTES
Hematology/Oncology Inpatient Progress Note    PATIENT NAME: Nieves Mc  : 1975  MRN: [id number]    CHIEF COMPLAINT: Hypoxia    HISTORY OF PRESENT ILLNESS:      Nieves Mc is a 48 y.o. female who presented to [facility] on 2024 with complaints of shortness of breath.  She was found to hypoxic with SpO2 76% while undergoing evaluation for blood transfusion.  She was subsequently sent to the ED for further evaluation.  She does report bilateral leg swelling.  In the ED she was saturating 86% and requiring 3 L nasal cannula to maintain PaO2 over 90%.  She was mildly tachycardic, hypertensive and afebrile.  Chest x-ray completed showed patchy right greater than left airspace opacities.  CBC was notable for neutropenia, anemia.  She was started on IV antibiotics.  Patient is now needing 5 L oxygen.  Cardiology and pulmonology have been consulted.  It appears she has been noncompliant with her cardiac medications and her cardiology outpatient follow-up.  Interval chest x-ray on 10/1/2024 showed stable patchy multifocal airspace infiltrates interstitial changes throughout the lungs bilaterally.  She had duplex ultrasound bilateral lower extremities due to swelling and this was normal.      10/04/24  Hematology/Oncology was consulted.  She is established with Dr. Lerma for CML.  She is currently on treatment with ponatinib, but most recent office visit with Dr. Lerma she had stated she has been noncompliant over the last 3 to 4 weeks.  She was encouraged at that visit to restart her treatment.    10/5/24: Pt seen today for follow up. Reported that her shortness of breath has improved. However continues to be hypoxic off Oxygen support. She is currently on IV antibiotics.    Subjective     Family at her bedside.  Patient still declines being transferred to the bone marrow unit for AML    ROS:  Review of Systems   Constitutional:  Positive for fatigue.   HENT: Negative.     Eyes: Negative.   "  Respiratory:  Positive for shortness of breath.    Cardiovascular: Negative.    Gastrointestinal: Negative.    Endocrine: Negative.    Genitourinary: Negative.    Musculoskeletal: Negative.    Skin: Negative.    Allergic/Immunologic: Negative.    Neurological: Negative.    Hematological: Negative.    Psychiatric/Behavioral: Negative.          MEDICATIONS:    Scheduled Meds:  allopurinol, 200 mg, Oral, Q8H  amoxicillin-clavulanate, 1 tablet, Oral, Q12H  bumetanide, 1 mg, Intravenous, BID  gabapentin, 800 mg, Oral, Q8H  hydroxyurea, 1,000 mg, Oral, BID  insulin glargine, 28 Units, Subcutaneous, Nightly  insulin lispro, 2-7 Units, Subcutaneous, 4x Daily AC & at Bedtime  insulin lispro, 7 Units, Subcutaneous, TID With Meals  losartan, 12.5 mg, Oral, Q24H  metoprolol succinate XL, 50 mg, Oral, Q12H  mirtazapine, 15 mg, Oral, Nightly  rivaroxaban, 10 mg, Oral, Daily  sodium chloride, 10 mL, Intravenous, Q12H       Continuous Infusions:  pain,        PRN Meds:    acetaminophen **OR** acetaminophen **OR** acetaminophen    albuterol    ALPRAZolam    senna-docusate sodium **AND** polyethylene glycol **AND** bisacodyl **AND** bisacodyl    Calcium Replacement - Follow Nurse / BPA Driven Protocol    dextrose    dextrose    Diclofenac Sodium    glucagon (human recombinant)    hydrALAZINE    Magnesium Standard Dose Replacement - Follow Nurse / BPA Driven Protocol    Morphine    ondansetron    Phosphorus Replacement - Follow Nurse / BPA Driven Protocol    Potassium Replacement - Follow Nurse / BPA Driven Protocol    [COMPLETED] Insert Peripheral IV **AND** sodium chloride    sodium chloride    sodium chloride    tiZANidine     ALLERGIES:    Allergies   Allergen Reactions    Contrast Dye (Echo Or Unknown Ct/Mr) Shortness Of Breath and Nausea And Vomiting     Flushed        Objective    VITALS:   /63   Pulse 98   Temp 98 °F (36.7 °C) (Oral)   Resp 16   Ht 162.6 cm (64\")   Wt 89.4 kg (197 lb)   LMP 05/25/2022 " (Approximate)   SpO2 98%   BMI 33.81 kg/m²     PHYSICAL EXAM: (performed by MD)  Physical Exam  Constitutional:       Appearance: She is normal weight. She is ill-appearing.   HENT:      Head: Normocephalic and atraumatic.      Right Ear: External ear normal.      Left Ear: External ear normal.      Nose: Nose normal.      Mouth/Throat:      Mouth: Mucous membranes are moist.      Pharynx: Oropharynx is clear.   Eyes:      Extraocular Movements: Extraocular movements intact.      Conjunctiva/sclera: Conjunctivae normal.      Pupils: Pupils are equal, round, and reactive to light.   Cardiovascular:      Rate and Rhythm: Normal rate.      Pulses: Normal pulses.   Pulmonary:      Effort: Pulmonary effort is normal.   Abdominal:      General: Abdomen is flat.      Palpations: Abdomen is soft.   Musculoskeletal:         General: Normal range of motion.      Cervical back: Normal range of motion and neck supple.   Skin:     General: Skin is warm.   Neurological:      Mental Status: She is alert.   Psychiatric:         Mood and Affect: Mood normal.         Behavior: Behavior normal.         Thought Content: Thought content normal.         Judgment: Judgment normal.     I have reexamined the patient and the results are consistent with the previously documented exam. Meghann Lerma MD        RECENT LABS:    Lab Results (last 24 hours)       Procedure Component Value Units Date/Time    POC Glucose 4x Daily Before Meals & at Bedtime [[id number]]  (Abnormal) Collected: 10/09/24 1730    Specimen: Blood Updated: 10/09/24 1732     Glucose 152 mg/dL      Comment: Serial Number: [id number]:  [id number]       POC Glucose 4x Daily Before Meals & at Bedtime [[id number]]  (Abnormal) Collected: 10/09/24 1136    Specimen: Blood Updated: 10/09/24 1138     Glucose 137 mg/dL      Comment: Serial Number: [id number]:  [id number]       Blood Culture - Blood, Hand, Left [[id number]]  (Normal) Collected: 10/04/24 1111     Specimen: Blood from Hand, Left Updated: 10/09/24 1130     Blood Culture No growth at 5 days    Blood Culture - Blood, Hand, Right [[id number]]  (Normal) Collected: 10/04/24 1111    Specimen: Blood from Hand, Right Updated: 10/09/24 1130     Blood Culture No growth at 5 days    POC Glucose 4x Daily Before Meals & at Bedtime [[id number]]  (Abnormal) Collected: 10/09/24 0813    Specimen: Blood Updated: 10/09/24 0816     Glucose 199 mg/dL      Comment: Serial Number: [id number]:  [id number]       CBC & Differential [[id number]]  (Abnormal) Collected: 10/09/24 0234    Specimen: Blood from Arm, Left Updated: 10/09/24 0528    Narrative:      The following orders were created for panel order CBC & Differential.  Procedure                               Abnormality         Status                     ---------                               -----------         ------                     CBC Auto Differential[[id number]]        Abnormal            Final result               Scan Slide[[id number]]                                       Final result                 Please view results for these tests on the individual orders.    Scan Slide [[id number]] Collected: 10/09/24 0234    Specimen: Blood from Arm, Left Updated: 10/09/24 0528     Scan Slide --     Comment: See Manual Differential Results       Manual Differential [[id number]]  (Abnormal) Collected: 10/09/24 0234    Specimen: Blood from Arm, Left Updated: 10/09/24 0528     Neutrophil % 15.0 %      Lymphocyte % 41.0 %      Monocyte % 8.0 %      Eosinophil % 1.0 %      Basophil % 1.0 %      Bands %  2.0 %      Metamyelocyte % 5.0 %      Myelocyte % 5.0 %      Blasts % 22.0 %      Neutrophils Absolute 2.54 10*3/mm3      Lymphocytes Absolute 6.12 10*3/mm3      Monocytes Absolute 1.19 10*3/mm3      Eosinophils Absolute 0.15 10*3/mm3      Basophils Absolute 0.15 10*3/mm3      nRBC 2.0 /100 WBC      Anisocytosis Slight/1+     WBC Morphology Normal     Platelet Estimate Decreased     Narrative:      Reviewed by Pathologist within the past 30 days on [id number].      CBC Auto Differential [[id number]]  (Abnormal) Collected: 10/09/24 0234    Specimen: Blood from Arm, Left Updated: 10/09/24 0528     WBC 14.93 10*3/mm3      RBC 2.60 10*6/mm3      Hemoglobin 7.1 g/dL      Hematocrit 24.2 %      MCV 93.1 fL      MCH 27.3 pg      MCHC 29.3 g/dL      RDW 17.8 %      RDW-SD 61.0 fl      MPV 9.4 fL      Platelets 101 10*3/mm3     Narrative:      The previously reported component NRBC is no longer being reported. Previous result was 2.1 /100 WBC (Reference Range: 0.0-0.2 /100 WBC) on 10/9/2024 at 0333 EDT.    Comprehensive Metabolic Panel [[id number]]  (Abnormal) Collected: 10/09/24 0234    Specimen: Blood from Arm, Left Updated: 10/09/24 0355     Glucose 158 mg/dL      BUN 41 mg/dL      Creatinine 0.80 mg/dL      Sodium 145 mmol/L      Potassium 4.7 mmol/L      Chloride 107 mmol/L      CO2 29.3 mmol/L      Calcium 8.4 mg/dL      Total Protein 6.8 g/dL      Albumin 3.5 g/dL      ALT (SGPT) 10 U/L      AST (SGOT) 16 U/L      Alkaline Phosphatase 250 U/L      Total Bilirubin 0.4 mg/dL      Globulin 3.3 gm/dL      A/G Ratio 1.1 g/dL      BUN/Creatinine Ratio 51.3     Anion Gap 8.7 mmol/L      eGFR 91.0 mL/min/1.73     Narrative:      GFR Normal >60  Chronic Kidney Disease <60  Kidney Failure <15      Uric Acid [[id number]]  (Abnormal) Collected: 10/09/24 0234    Specimen: Blood from Arm, Left Updated: 10/09/24 0355     Uric Acid 1.7 mg/dL     ANCA Panel [[id number]] Collected: 10/05/24 1329    Specimen: Blood from Arm, Right Updated: 10/08/24 2107     ANTI-MPO ANTIBODIES <0.2 units      ANTI-PR3 ANTIBODIES <0.2 units      C-ANCA <1:20 titer      P-ANCA <1:20 titer      Comment: The presence of positive fluorescence exhibiting P-ANCA or C-ANCA  patterns alone is not specific for the diagnosis of Wegener's  Granulomatosis (WG) or microscopic polyangiitis. Decisions about  treatment should not be based solely on ANCA  IFA results.  The  International ANCA Group Consensus recommends follow up testing of  positive sera with both KS-3 and MPO-ANCA enzyme immunoassays. As  many as 5% serum samples are positive only by EIA.  Ref. AM J Clin Pathol 1999;111:507-513.        Atypical pANCA <1:20 titer      Comment: The atypical pANCA pattern has been observed in a significant  percentage of patients with ulcerative colitis, primary sclerosing  cholangitis and autoimmune hepatitis.       Narrative:      Performed at:  01 - [facility]  [id number]  : Kim Kingston MD, Phone:  [phone]  Performed at:  02 - [facility] [address]  [id number]  : Robbin Ladd PhD, Phone:  [phone]    POC Glucose Once [[id number]]  (Abnormal) Collected: 10/08/24 1919    Specimen: Blood Updated: 10/08/24 1921     Glucose 168 mg/dL      Comment: Serial Number: [id number]:  [id number]               PENDING RESULTS:     IMAGING REVIEWED:  No radiology results for the last day    Assessment & Plan :      ASSESSMENT:    Blast phase phase CML:    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   -As per chart review, She has not been compliant and was encouraged to restart at last visit 9/26/2024.  Although patient disputed this and stated that she has been compliant except for last 1-2 weeks.  -Ponatinib on hold for now.   -labs reviewed, Noted to have uptrending Blast count and immature forms, 45% blasts noted.  -Coag parameters normal. S. Mg and P levels WNL. Renal function is stable.  -Fibrinogen levels are sent, pending. Flow Cytometry is pending.  -overall picture is concerning for transformation to blast crisis given >30% blasts in peripheral circulation.  -Will plan to start her on Rasburicase 6mg Daily,  -She has low Hb 7.3 and Plt count 157, Will start at Hydrea 1g BID and  monitor CBC closely.   -discussed with [facility] BMT service for potential transfer of the patient to BMT service, she was not considered a candidate for transfer at this time due to underlying multiple medical comorbidities and while awaiting further workup.  -Will plan for repeat Bone marrow biopsy tomorrow to further evaluate. This was discussed with pt in detail.  Bone marrow aspiration and biopsy ordered for today.  Reviewed with patient  Continue current treatment  Continue allopurinol    Status post bone marrow aspiration and biopsy which confirms blast phase of CML at 47%.  Recommend transfer to [facility] bone marrow unit for high-dose chemo.  Patient at this point in hesitating she will think about it and discuss further with her family and then make decision    Patient is still hesitant about being transferred.  She understands I will not going to be able to offer her high-dose chemotherapy in our facility.      3. Hyperuricemia:  S. Uric acid 15.2 yesterday. started Rasburicase and allopurinol  Uric acid is down to 1.7  Continue allopurinol at 200mg TID  Continue to monitor    4 ACute on chronic anemia.  Multifactorial    5. Acute hypoxic respiratory insufficiency.  Likely secondary to congestive heart failure, pneumonia.    Cardiology and pulmonology are following.  Currently on 5 L of oxygen nasal cannula and diuresis with Lasix.  -discussed with pulmonology, Recommended against IV steroids given ongoing leukocytosis,    Continue current care          Electronically signed by Meghann Lerma MD, 10/10/24, 8:03 AM EDT.

## 2024-10-09 NOTE — PLAN OF CARE
Goal Outcome Evaluation:              Problem: Adult Inpatient Plan of Care  Goal: Absence of Hospital-Acquired Illness or Injury  Intervention: Identify and Manage Fall Risk  Intervention: Prevent Skin Injury  Intervention: Prevent and Manage VTE (Venous Thromboembolism) Risk  Goal: Optimal Comfort and Wellbeing  Intervention: Monitor Pain and Promote Comfort  Intervention: Provide Person-Centered Care     Problem: Diabetes Comorbidity  Goal: Blood Glucose Level Within Targeted Range  Intervention: Monitor and Manage Glycemia     Problem: Heart Failure Comorbidity  Goal: Maintenance of Heart Failure Symptom Control  Intervention: Maintain Heart Failure-Management     Problem: Pain Chronic (Persistent) (Comorbidity Management)  Goal: Acceptable Pain Control and Functional Ability  Intervention: Manage Persistent Pain  Intervention: Develop Pain Management Plan  Intervention: Optimize Psychosocial Wellbeing     Problem: Skin Injury Risk Increased  Goal: Skin Health and Integrity  Intervention: Optimize Skin Protection     Problem: Fall Injury Risk  Goal: Absence of Fall and Fall-Related Injury  Intervention: Identify and Manage Contributors  Intervention: Promote Injury-Free Environment     Problem: Adjustment to Illness (Sepsis/Septic Shock)  Goal: Optimal Coping  Intervention: Optimize Psychosocial Adjustment to Illness     Problem: Glycemic Control Impaired (Sepsis/Septic Shock)  Goal: Blood Glucose Level Within Desired Range  Intervention: Optimize Glycemic Control     Problem: Infection Progression (Sepsis/Septic Shock)  Goal: Absence of Infection Signs and Symptoms  Intervention: Promote Recovery  Intervention: Promote Stabilization        Patient A&O received blood today. Call light and bedside table within reach.

## 2024-10-09 NOTE — CASE MANAGEMENT/SOCIAL WORK
Continued Stay Note   Mt     Patient Name: Nieves Mc  MRN: [id number]  Today's Date: 10/9/2024    Admit Date: 9/27/2024    Plan: Return home with [facility] (accepted- will need order.) Watch for new home O2 needs.   Discharge Plan       Row Name 10/09/24 1410       Plan    Plan Comments DC barriers: 5L NC, IV bumex, Hgb 7.0 with PRBC transfusion today, BUN 41, WBC 14.93, pending bone marrow BX results. Oncology, and pulmonology following. Possible trasfer to [facility]             Elsa Lawson RN     Office phone: [phone]  Office fax: [phone]

## 2024-10-09 NOTE — PROGRESS NOTES
[facility] MEDICINE SERVICE  DAILY PROGRESS NOTE    NAME: Nieves Mc  : 1975  MRN: [id number]      LOS: 12 days     PROVIDER OF SERVICE: Timothy Duane Brammell, MD    Chief Complaint: Acute hypoxemic respiratory failure    Subjective:     Interval History:  History taken from: patient    Patient overall little change.  She feels less swollen with her legs.  Shortness of breath is changed little.  She is eating.  Generally weak.  She is aware of the recommendations of oncology for transfer for ongoing hematologic care.  Her social situation with her 8-year-old grandchild that she is raising is a significant prohibiting factor in her decision making process.  She really denies any other acute issues.        Review of Systems:   Review of Systems   All other systems reviewed and are negative.      Objective:     Vital Signs  Temp:  [97.9 °F (36.6 °C)-98.4 °F (36.9 °C)] 98 °F (36.7 °C)  Heart Rate:  [] 98  Resp:  [12-16] 16  BP: ()/(57-72) 109/63  Flow (L/min):  [5] 5   Body mass index is 33.81 kg/m².    Physical Exam  Physical Exam  Vitals reviewed.   Constitutional:       Appearance: She is obese.   HENT:      Head: Normocephalic.   Cardiovascular:      Rate and Rhythm: Normal rate and regular rhythm.   Pulmonary:      Effort: Pulmonary effort is normal.      Breath sounds: Normal breath sounds.   Abdominal:      Palpations: Abdomen is soft.      Tenderness: There is no abdominal tenderness.   Musculoskeletal:         General: No swelling.   Neurological:      Mental Status: She is alert. Mental status is at baseline.            Diagnostic Data    Results from last 7 days   Lab Units 10/09/24  0234   WBC 10*3/mm3 14.93*   HEMOGLOBIN g/dL 7.1*   HEMATOCRIT % 24.2*   PLATELETS 10*3/mm3 101*   GLUCOSE mg/dL 158*   CREATININE mg/dL 0.80   BUN mg/dL 41*   SODIUM mmol/L 145   POTASSIUM mmol/L 4.7   AST (SGOT) U/L 16   ALT (SGPT) U/L 10   ALK PHOS U/L 250*   BILIRUBIN mg/dL 0.4   ANION GAP  mmol/L 8.7       No radiology results for the last day          Assessment/Plan:   Acute hypoxemic respiratory failure  Community-acquired multifocal pneumonia  Acute on chronic diastolic congestive heart failure  Chronic myelogenous leukemia with blasts  Insulin requiring diabetes Type 2  Chronic anxiety  Medical noncompliance  History of pulmonary emboli  Chronic anticoagulation on hold at present await bone marrow bx  Strep bacteremia  Anemia      Plan: Discussed with oncology.  Discussed extensively with patient and her daughter all her concerns and encouraged her to focus on her health.  Case management going to talk to  about any support they may be able to offer.  I also discussed hospice in the future which she says she is not ready for.  She does appear to understand that she has limited options with her care and her respiratory status.  Discussed her oxygen need extensively.  Plan transfusion by hematology.  Attempting diuresis.  Will resume anticoagulation.  Patient not consenting to allow me to put her on a waiting list at [facility] bone marrow transplant unit until she speaks to the family.  Prolonged visit greater than 45 minutes with greater than half spent with discussion.      Active and Resolved Problems  Active Hospital Problems    Diagnosis  POA    **Acute hypoxemic respiratory failure [J96.01]  Yes    Acute on chronic HFrEF (heart failure with reduced ejection fraction) [I50.23]  Yes    Sinus tachycardia [R00.0]  Yes    Anemia due to chemotherapy [D64.81, T45.1X5A]  Yes    NICM (nonischemic cardiomyopathy) [I42.8]  Yes    Type 2 diabetes mellitus with diabetic polyneuropathy, with long-term current use of insulin [E11.42, Z79.4]  Not Applicable    Medically noncompliant [Z91.199]  Not Applicable    History of pulmonary embolism [Z86.711]  Yes    CML (chronic myelocytic leukemia) [C92.10]  Yes      Resolved Hospital Problems   No resolved problems to display.           VTE  Prophylaxis:  Pharmacologic VTE prophylaxis orders are present.             Disposition Planning:     Barriers to Discharge: treatment of leukemia and respiratory improvment  Anticipated Date of Discharge: 10/14  Place of Discharge: home vs transfer      Time: 45 minutes     Code Status and Medical Interventions: CPR (Attempt to Resuscitate); Full Support   Ordered at: 09/28/24 0443     Code Status (Patient has no pulse and is not breathing):    CPR (Attempt to Resuscitate)     Medical Interventions (Patient has pulse or is breathing):    Full Support       Signature: Electronically signed by Timothy Duane Brammell, MD, 10/09/24, 16:35 EDT.  [facility] Hospitalist Team

## 2024-10-09 NOTE — PROGRESS NOTES
Patient is somnolent, hypertensive, tachycardic referring Provider: Brammell, Timothy Duane,*    Reason for follow-up: HFrEF, tachycardia     Patient Care Team:  Pankaj Stover MD as PCP - General (Internal Medicine)  Meghann Lerma MD as Consulting Physician (Hematology and Oncology)  Hamida Feldman MD as Consulting Physician (Cardiology)  Rohan Jackson MD as Cardiologist (Cardiology)      SUBJECTIVE  Resting comfortably at the edge of the bed.  Wants to go home     ROS  Review of all systems negative except as indicated.    Since I have last seen, the patient has been without any chest discomfort, shortness of breath, palpitations, dizziness or syncope.  Denies having any headache, abdominal pain, nausea, vomiting, diarrhea, constipation, loss of weight or loss of appetite.  Denies having any excessive bruising, hematuria or blood in the stool.        Personal History:    Past Medical History:   Diagnosis Date    Acute respiratory failure with hypoxia 07/28/2022    Adverse effect of chemotherapy 11/08/2023    Bilateral subdural hematomas 05/18/2023    Bone pain     Chronic constipation 07/07/2023    CML (chronic myelocytic leukemia) 04/01/2018    COVID-19 virus infection 01/12/2022    Diabetes mellitus     Diabetic gastroparesis 07/07/2023    Extremity pain     Carlin. legs pain    Fall during current hospitalization 06/25/2023    Leg pain     left leg greater    Medically noncompliant 03/11/2021    Migraine     Petechial rash 11/08/2023    Pubic ramus fracture, left, closed, initial encounter 06/25/2023    Pulmonary embolism     Traumatic subdural hemorrhage without loss of consciousness 05/17/2023    Tumor lysis syndrome 07/05/2022    UTI (urinary tract infection) 07/06/2023    Vision loss     doing surgery       Past Surgical History:   Procedure Laterality Date    BONE MARROW BIOPSY      BREAST SURGERY      BRONCHOSCOPY N/A 6/6/2022    Procedure: BRONCHOSCOPY bilateral lung washing;  Surgeon:  Charlene Camara MD;  Location: [facility] ENDOSCOPY;  Service: Pulmonary;  Laterality: N/A;  post: rule out infection vs transfusion lung injury     SECTION      CHOLECYSTECTOMY      COLONOSCOPY N/A 2023    Procedure: COLONOSCOPY;  Surgeon: Freddy Villeda MD;  Location: [facility] ENDOSCOPY;  Service: Gastroenterology;  Laterality: N/A;  poor prep    ENDOSCOPY N/A 2023    Procedure: ESOPHAGOGASTRODUODENOSCOPY with biopsy x2 areas;  Surgeon: Freddy Villeda MD;  Location: [facility] ENDOSCOPY;  Service: Gastroenterology;  Laterality: N/A;  food in stomach;abnormal duodenal mucosa    EYE SURGERY      laser surgery due  to hemmorage--- 2021-- another surgery  lt eye11/15/21    RETINAL DETACHMENT SURGERY      SPINE SURGERY      Lombardi spinal block    TUBAL ABDOMINAL LIGATION         Family History   Problem Relation Age of Onset    Diabetes Mother     Diabetes Maternal Grandmother     Heart attack Maternal Grandmother     Stroke Maternal Grandmother        Social History     Tobacco Use    Smoking status: Never    Smokeless tobacco: Never   Vaping Use    Vaping status: Never Used   Substance Use Topics    Alcohol use: No    Drug use: No        Home meds:  Prior to Admission medications    Medication Sig Start Date End Date Taking? Authorizing Provider   acetaminophen (TYLENOL) 325 MG tablet Take 2 tablets by mouth Every 4 (Four) Hours As Needed for Moderate Pain. Indications: Fever, Pain 24  Yes Meghann Lerma MD   ALPRAZolam (Xanax) 0.5 MG tablet Take 1 tablet by mouth At Night As Needed for Anxiety. Indications: Feeling Anxious 24  Yes Denisha Gill APRN   Diclofenac Sodium (VOLTAREN) 1 % gel gel Apply 4 g topically to the appropriate area as directed 4 (Four) Times a Day As Needed (hip pain). 24  Yes Denisha Gill APRN   doxycycline (VIBRAMYICN) 100 MG tablet Take 1 tablet by mouth 2 (Two) Times a Day for 10 days. 9/26/24 10/6/24 Yes Meghann Lerma MD    furosemide (LASIX) 40 MG tablet Take 1 tablet by mouth Daily. Indications: Edema 9/19/24  Yes Pankaj Stover MD   gabapentin (Neurontin) 800 MG tablet Take 1 tablet by mouth 3 (Three) Times a Day. Ordered through [facility]  Indications: Diabetes with Nerve Disease 5/15/24  Yes Panakj Stover MD   Insulin Glargine (LANTUS SOLOSTAR) 100 UNIT/ML injection pen Inject 28 Units under the skin into the appropriate area as directed Every Night. Indications: Type 2 Diabetes 9/16/24  Yes Pankaj Stover MD   Insulin Lispro, 1 Unit Dial, (HumaLOG KwikPen) 100 UNIT/ML solution pen-injector Inject 7 Units under the skin into the appropriate area as directed 3 (Three) Times a Day Before Meals. Dx code: E11.65 9/16/24  Yes Pankaj Stover MD   levoFLOXacin (Levaquin) 500 MG tablet Take 1 tablet by mouth Daily. 9/24/24  Yes Meghann Lerma MD   mirtazapine (REMERON SOL-TAB) 15 MG disintegrating tablet Place 1 tablet on the tongue Every Night.   Yes Provider, MD Melecio   pain patient supplied pump by Intrathecal route Continuous.   Yes ProviderMelecio MD   PONATinib HCl (Iclusig) 10 MG tablet Take 10 mg by mouth Every Other Day. Take with or without food.  Swallow whole, do not crush or chew. 8/21/24  Yes Meghann Lerma MD   rivaroxaban (Xarelto) 10 MG tablet Take 1 tablet by mouth Daily. 6/11/24  Yes Denihsa Gill APRN   tiZANidine (ZANAFLEX) 4 MG tablet Take 1 tablet by mouth Daily. Indications: Musculoskeletal Pain 5/15/24  Yes Pankaj Stover MD       Allergies:  Contrast dye (echo or unknown ct/mr)    Scheduled Meds:allopurinol, 200 mg, Oral, Q8H  amoxicillin-clavulanate, 1 tablet, Oral, Q12H  bumetanide, 1 mg, Intravenous, BID  gabapentin, 800 mg, Oral, Q8H  hydroxyurea, 1,000 mg, Oral, BID  insulin glargine, 28 Units, Subcutaneous, Nightly  insulin lispro, 2-7 Units, Subcutaneous, 4x Daily AC & at Bedtime  insulin lispro, 7 Units, Subcutaneous, TID With Meals  losartan,  "12.5 mg, Oral, Q24H  metoprolol succinate XL, 50 mg, Oral, Q12H  mirtazapine, 15 mg, Oral, Nightly  [Held by provider] rivaroxaban, 10 mg, Oral, Daily  sodium chloride, 10 mL, Intravenous, Q12H      Continuous Infusions:pain,       PRN Meds:.  acetaminophen **OR** acetaminophen **OR** acetaminophen    albuterol    ALPRAZolam    senna-docusate sodium **AND** polyethylene glycol **AND** bisacodyl **AND** bisacodyl    Calcium Replacement - Follow Nurse / BPA Driven Protocol    dextrose    dextrose    Diclofenac Sodium    glucagon (human recombinant)    hydrALAZINE    Magnesium Standard Dose Replacement - Follow Nurse / BPA Driven Protocol    Morphine    ondansetron    Phosphorus Replacement - Follow Nurse / BPA Driven Protocol    Potassium Replacement - Follow Nurse / BPA Driven Protocol    [COMPLETED] Insert Peripheral IV **AND** sodium chloride    sodium chloride    sodium chloride    tiZANidine      OBJECTIVE    Vital Signs  Vitals:    10/08/24 1612 10/08/24 1915 10/08/24 2325 10/09/24 0355   BP: 115/74 124/72 98/65 105/68   BP Location: Right arm Left arm Left arm Left arm   Patient Position: Sitting Sitting Lying Sitting   Pulse: 96 96 96 88   Resp: 16 16 16 14   Temp: 98.1 °F (36.7 °C) 98.4 °F (36.9 °C) 97.9 °F (36.6 °C) 98 °F (36.7 °C)   TempSrc: Oral Oral Oral Oral   SpO2: 95% 98% 93% 95%   Weight:       Height:           Flowsheet Rows      Flowsheet Row First Filed Value   Admission Height 160 cm (63\") Documented at 09/27/2024 1630   Admission Weight 66.2 kg (146 lb) Documented at 09/27/2024 1630            No intake or output data in the 24 hours ending 10/09/24 0633         Telemetry: Sinus rhythm/sinus tachycardia    Physical Exam:  The patient is alert, oriented and in no distress.  Vital signs as noted above.  Head and neck revealed no carotid bruits or jugular venous distention.  No thyromegaly or lymphadenopathy is present  Distant and diminished breath sounds.  Heart normal first and second heart " sounds.  No murmur. No precordial rub is present.  No gallop is present.  Abdomen soft and nontender.  No organomegaly is present.  Extremities with good peripheral pulses with bilateral lower extremity edema  Skin warm and dry.  Musculoskeletal system is grossly normal.  CNS grossly normal.       Results Review:  I have personally reviewed the results from the time of this admission to 10/9/2024 06:33 EDT and agree with these findings:  []  Laboratory  []  Microbiology  []  Radiology  []  EKG/Telemetry   []  Cardiology/Vascular   []  Pathology  []  Old records  []  Other:    Most notable findings include:    Lab Results (last 24 hours)       Procedure Component Value Units Date/Time    CBC & Differential [[id number]]  (Abnormal) Collected: 10/09/24 0234    Specimen: Blood from Arm, Left Updated: 10/09/24 0528    Narrative:      The following orders were created for panel order CBC & Differential.  Procedure                               Abnormality         Status                     ---------                               -----------         ------                     CBC Auto Differential[[id number]]        Abnormal            Final result               Scan Slide[[id number]]                                       Final result                 Please view results for these tests on the individual orders.    Scan Slide [[id number]] Collected: 10/09/24 0234    Specimen: Blood from Arm, Left Updated: 10/09/24 0528     Scan Slide --     Comment: See Manual Differential Results       Manual Differential [[id number]]  (Abnormal) Collected: 10/09/24 0234    Specimen: Blood from Arm, Left Updated: 10/09/24 0528     Neutrophil % 15.0 %      Lymphocyte % 41.0 %      Monocyte % 8.0 %      Eosinophil % 1.0 %      Basophil % 1.0 %      Bands %  2.0 %      Metamyelocyte % 5.0 %      Myelocyte % 5.0 %      Blasts % 22.0 %      Neutrophils Absolute 2.54 10*3/mm3      Lymphocytes Absolute 6.12 10*3/mm3      Monocytes Absolute 1.19  10*3/mm3      Eosinophils Absolute 0.15 10*3/mm3      Basophils Absolute 0.15 10*3/mm3      nRBC 2.0 /100 WBC      Anisocytosis Slight/1+     WBC Morphology Normal     Platelet Estimate Decreased    Narrative:      Reviewed by Pathologist within the past 30 days on [id number].      CBC Auto Differential [[id number]]  (Abnormal) Collected: 10/09/24 0234    Specimen: Blood from Arm, Left Updated: 10/09/24 0528     WBC 14.93 10*3/mm3      RBC 2.60 10*6/mm3      Hemoglobin 7.1 g/dL      Hematocrit 24.2 %      MCV 93.1 fL      MCH 27.3 pg      MCHC 29.3 g/dL      RDW 17.8 %      RDW-SD 61.0 fl      MPV 9.4 fL      Platelets 101 10*3/mm3     Narrative:      The previously reported component NRBC is no longer being reported. Previous result was 2.1 /100 WBC (Reference Range: 0.0-0.2 /100 WBC) on 10/9/2024 at 0333 EDT.    Comprehensive Metabolic Panel [[id number]]  (Abnormal) Collected: 10/09/24 0234    Specimen: Blood from Arm, Left Updated: 10/09/24 0355     Glucose 158 mg/dL      BUN 41 mg/dL      Creatinine 0.80 mg/dL      Sodium 145 mmol/L      Potassium 4.7 mmol/L      Chloride 107 mmol/L      CO2 29.3 mmol/L      Calcium 8.4 mg/dL      Total Protein 6.8 g/dL      Albumin 3.5 g/dL      ALT (SGPT) 10 U/L      AST (SGOT) 16 U/L      Alkaline Phosphatase 250 U/L      Total Bilirubin 0.4 mg/dL      Globulin 3.3 gm/dL      A/G Ratio 1.1 g/dL      BUN/Creatinine Ratio 51.3     Anion Gap 8.7 mmol/L      eGFR 91.0 mL/min/1.73     Narrative:      GFR Normal >60  Chronic Kidney Disease <60  Kidney Failure <15      Uric Acid [[id number]]  (Abnormal) Collected: 10/09/24 0234    Specimen: Blood from Arm, Left Updated: 10/09/24 0355     Uric Acid 1.7 mg/dL     ANCA Panel [[id number]] Collected: 10/05/24 1329    Specimen: Blood from Arm, Right Updated: 10/08/24 2107     ANTI-MPO ANTIBODIES <0.2 units      ANTI-PR3 ANTIBODIES <0.2 units      C-ANCA <1:20 titer      P-ANCA <1:20 titer      Comment: The presence of positive fluorescence  exhibiting P-ANCA or C-ANCA  patterns alone is not specific for the diagnosis of Wegener's  Granulomatosis (WG) or microscopic polyangiitis. Decisions about  treatment should not be based solely on ANCA IFA results.  The  International ANCA Group Consensus recommends follow up testing of  positive sera with both SD-3 and MPO-ANCA enzyme immunoassays. As  many as 5% serum samples are positive only by EIA.  Ref. AM J Clin Pathol 1999;111:507-513.        Atypical pANCA <1:20 titer      Comment: The atypical pANCA pattern has been observed in a significant  percentage of patients with ulcerative colitis, primary sclerosing  cholangitis and autoimmune hepatitis.       Narrative:      Performed at:  [address]  [id number]  : Kim Kingston MD, Phone:  [phone]  Performed at:  [address]  [id number]  : Robbin Ladd PhD, Phone:  [phone]    POC Glucose Once [[id number]]  (Abnormal) Collected: 10/08/24 1919    Specimen: Blood Updated: 10/08/24 1921     Glucose 168 mg/dL      Comment: Serial Number: [id number]:  [id number]       POC Glucose 4x Daily Before Meals & at Bedtime [[id number]]  (Normal) Collected: 10/08/24 1705    Specimen: Blood Updated: 10/08/24 1706     Glucose 98 mg/dL      Comment: Serial Number: [id number]:  [id number]       JOSE by IFA, Reflex 9-biomarkers profile [[id number]]  (Abnormal) Collected: 10/05/24 1329    Specimen: Blood from Arm, Right Updated: 10/08/24 1615     JOSE Positive     Comment:                                      Negative   <1:80                                       Borderline  1:80                                       Positive   >1:80        Please note Comment     Comment: JOSE Multiplex methodology was designed to detect up to 11 antibodies  of the 100+ antibodies that may be detected by JOSE IFA methodology.       Narrative:      Performed at:  [address] [place] [address]  [address]  [id number]  : Robbin Ladd PhD, Phone:  [phone]    AZAEL+DNA/DS+Antich+Centro+FA.. [[id number]]  (Abnormal) Collected: 10/05/24 1329    Specimen: Blood from Arm, Right Updated: 10/08/24 1610     Speckled Pattern 1:1280     Comment: Dense Fine Speckled pattern is noted. This pattern suggests the  presence of DFS70 antibody which has a low prevalence in systemic  autoimmune rheumatic diseases.  ICAP nomenclature: AC-2,4,5,29        Note: (Reference) Comment     Comment: Pattern              Potential Disease Association  -------------  ---------------------------------------------  Homogeneous    Systemic Lupus Erythematosus, Drug Induced                 Systemic Lupus Erythematosus, Chronic                 Autoimmune hepatitis, Juvenile Idiopathic                 Arthritis  -------------  ---------------------------------------------  Speckled       Sjogren Syndrome, Systemic Lupus                 Erythematosus, Subacute Cutaneous Lupus,                  Lupus, Congenital Heart Block,                 Mixed Connective Tissue Disease,                 Scleroderma-diffuse, Scleroderma-Autoimmune                 Myositis Overlap Syndrome, Systemic Lupus                 Hllmqvmdiljjq-Olfadtanwhg-Nqsogispsd                 Myositis Overlap Syndrome, Systemic                 Autoimmune Rheumatic Disease,                 Undifferentiated Connective Tissue Disease  -------------  ---------------------------------------------  Nucleolar      Systemic Sclerosis, Scleroderma-Autoimmune                 Myositis Overlap Syndrome, Sjogren                 Syndrome, Raynaud phenomenon, Pulmonary                 Arterial Hypertension, Systemic Autoimmune                 Rheumatic Disease, Cancer  -------------  ---------------------------------------------  Centromere     Scleroderma-CREST, Limited Cutaneous SSc,                 Raynaud's Phenomenon, Primary Biliary                  Cholangitis  -------------  ---------------------------------------------  Nuclear Dot    Primary Biliary Cholangitis  -------------  ---------------------------------------------  Nuclear        Primary Biliary Cholangitis, Autoimmune  Membrane       Hepatitis/Liver disease, Systemic Autoimmune                 Rheumatic Disease, Autoimmune Cytopenias,                 Linear Scleroderma, Antiphospholipid Syndrome  -------------  ---------------------------------------------        Anti-DNA (DS) Ab Qn 1 IU/mL      Comment:                                    Negative      <5                                     Equivocal  5 - 9                                     Positive      >9        RNP Antibodies 0.2 AI      Ragsdale Antibodies <0.2 AI      Antiscleroderma-70 Antibodies <0.2 AI      Sjogren's Anti-SS-A <0.2 AI      Sjogren's Anti-SS-B <0.2 AI      Antichromatin Antibodies <0.2 AI      MALATHI-1 IgG <0.2 AI      Anti-Centromere B Antibodies <0.2 AI      See below: Comment     Comment: Autoantibody                       Disease Association  ------------------------------------------------------------                          Condition                  Frequency  ---------------------   ------------------------   ---------  Antinuclear Antibody,    SLE, mixed connective  Direct (JOSE-D)           tissue diseases  ---------------------   ------------------------   ---------  dsDNA                    SLE                        40 - 60%  ---------------------   ------------------------   ---------  Chromatin                Drug induced SLE                90%                           SLE                        48 - 97%  ---------------------   ------------------------   ---------  SSA (Ro)                 SLE                        25 - 35%                           Sjogren's Syndrome         40 - 70%                            Lupus                 100%  ---------------------   ------------------------    ---------  SSB (La)                 SLE                             10%                           Sjogren's Syndrome              30%  ---------------------   -----------------------    ---------  Sm (anti-Smith)          SLE                        15 - 30%  ---------------------   -----------------------    ---------  RNP                      Mixed Connective Tissue                           Disease                         95%  (U1 nRNP,                SLE                        30 - 50%  anti-ribonucleoprotein)  Polymyositis and/or                           Dermatomyositis                 20%  ---------------------   ------------------------   ---------  Scl-70 (antiDNA          Scleroderma (diffuse)      20 - 35%  topoisomerase)           Crest                           13%  ---------------------   ------------------------   ---------  Karuna-1                     Polymyositis and/or                           Dermatomyositis            20 - 40%  ---------------------   ------------------------   ---------  Centromere B             Scleroderma - Crest                           variant                         80%       Narrative:      Performed at:  [address]  [id number]  : Robbin Ladd PhD, Phone:  [phone]    Flow Cytometry [[id number]] Collected: 10/07/24 1148    Specimen: Bone Marrow from Iliac Updated: 10/08/24 5344     Consult Global Result Comment^Text^TXT     Comment: Bone Marrow Aspirate, Left Iliac Crest:  Acute myeloid leukemia (AML) (see comments)  DISCLAIMER: REFER TO HARDCOPY OR PDF FOR COMPLETE RESULT.   If synopsis provided, clinical decisions should not be   based on this interfaced synopsis alone.  Performed at:  1 - [facility]  [address] [place] [address] [address], [place]  [address]  :  Molly Vega M.D., Ph.D., Phone:  [phone]       Narrative:      Performed at:  1 - [facility]  [address] [id number]  :  Molly Vega M.D., Ph.D., Phone:  [phone]    Flow Cytometry [[id number]] Collected: 10/06/24 1211    Specimen: Blood from Arm, Right Updated: 10/08/24 1254     Consult Global Result Comment^Text^TXT     Comment: Peripheral Blood:  Acute myeloid leukemia (AML) (see comments)  DISCLAIMER: REFER TO HARDCOPY OR PDF FOR COMPLETE RESULT.   If synopsis provided, clinical decisions should not be   based on this interfaced synopsis alone.  Performed at:  [facility]  [address] [place], [place] [address], [place] [address]  :  Molly Vega M.D., Ph.D., Phone:  [phone]       Narrative:      Performed at:  [facility]  [address] [place], [place] [address], [place] [address]  :  Molly Vega M.D., Ph.D., Phone:  [phone]    Blood Culture - Blood, Hand, Left [[id number]]  (Normal) Collected: 10/04/24 1111    Specimen: Blood from Hand, Left Updated: 10/08/24 1130     Blood Culture No growth at 4 days    Blood Culture - Blood, Hand, Right [[id number]]  (Normal) Collected: 10/04/24 1111    Specimen: Blood from Hand, Right Updated: 10/08/24 1130     Blood Culture No growth at 4 days    POC Glucose 4x Daily Before Meals & at Bedtime [[id number]]  (Abnormal) Collected: 10/08/24 1123    Specimen: Blood Updated: 10/08/24 1125     Glucose 120 mg/dL      Comment: Serial Number: [id number]:  [id number]       POC Glucose 4x Daily Before Meals & at Bedtime [[id number]]  (Abnormal) Collected: 10/08/24 0700    Specimen: Blood Updated: 10/08/24 0703     Glucose 149 mg/dL      Comment: Serial Number: [id number]:  [id number]               Imaging Results (Last 24 Hours)       ** No results found for the last 24 hours. **            LAB RESULTS (LAST 7 DAYS)    CBC  Results from last 7 days   Lab Units 10/09/24  0234 10/08/24  0219 10/07/24  0240  10/06/24  1211 10/05/24  2339 10/05/24  0518 10/04/24  1545   WBC 10*3/mm3 14.93* 29.03* 41.38* 42.04* 46.36* 42.17* 37.23*   RBC 10*6/mm3 2.60* 2.68* 2.57* 2.81* 2.52* 2.60* 2.81*   HEMOGLOBIN g/dL 7.1* 7.3* 7.3* 7.7* 7.1* 7.3* 8.0*   HEMATOCRIT % 24.2* 24.8* 24.1* 26.1* 23.5* 24.5* 26.2*   MCV fL 93.1 92.5 93.8 92.9 93.3 94.2 93.2   PLATELETS 10*3/mm3 101* 131* 174 136* 139* 157 192       BMP  Results from last 7 days   Lab Units 10/09/24  0234 10/08/24  0219 10/07/24  0246 10/06/24  1211 10/05/24  2339 10/05/24  1329 10/05/24  0518   SODIUM mmol/L 145 145 146* 143 142 142 140   POTASSIUM mmol/L 4.7 4.4 4.5 4.9 4.6 5.3* 4.6   CHLORIDE mmol/L 107 107 107 106 107 106 104   CO2 mmol/L 29.3* 27.9 28.4 27.9 27.4 23.1 26.3   BUN mg/dL 41* 39* 41* 41* 41* 44* 41*   CREATININE mg/dL 0.80 0.90 0.99 1.06* 1.10* 1.31* 1.44*   GLUCOSE mg/dL 158* 174* 161* 125* 149* 130* 205*   MAGNESIUM mg/dL  --   --   --   --   --   --  1.8   PHOSPHORUS mg/dL  --   --   --   --   --   --  3.9       CMP   Results from last 7 days   Lab Units 10/09/24  0234 10/08/24  0219 10/07/24  0246 10/06/24  1211 10/05/24  2339 10/05/24  1329 10/05/24  0518   SODIUM mmol/L 145 145 146* 143 142 142 140   POTASSIUM mmol/L 4.7 4.4 4.5 4.9 4.6 5.3* 4.6   CHLORIDE mmol/L 107 107 107 106 107 106 104   CO2 mmol/L 29.3* 27.9 28.4 27.9 27.4 23.1 26.3   BUN mg/dL 41* 39* 41* 41* 41* 44* 41*   CREATININE mg/dL 0.80 0.90 0.99 1.06* 1.10* 1.31* 1.44*   GLUCOSE mg/dL 158* 174* 161* 125* 149* 130* 205*   ALBUMIN g/dL 3.5 3.6 3.7 3.8 3.6 3.7 3.5   BILIRUBIN mg/dL 0.4 0.4 0.3 0.4 0.3 0.3 0.3   ALK PHOS U/L 250* 275* 318* 330* 311* 362* 343*   AST (SGOT) U/L 16 21 19 19 15 19 14   ALT (SGPT) U/L 10 11 11 13 12 12 13       BNP        TROPONIN          CoAg  Results from last 7 days   Lab Units 10/05/24  1538   INR  1.10   APTT seconds 24.5       Creatinine Clearance  Estimated Creatinine Clearance: 93.1 mL/min (by C-G formula based on SCr of 0.8 mg/dL).    ABG         Radiology  CT Bone marrow biopsy and aspiration    Result Date: 10/7/2024  Technically successful bone marrow aspiration and biopsy of the left posterior superior iliac spine utilizing CT fluoroscopic guidance. Report dictated by: Akira Killian DNP  I have personally reviewed this case and agree with the findings above: Electronically Signed: Brady Dodd MD  10/7/2024 3:09 PM EDT  Workstation ID: [id number]       EKG  I personally viewed and interpreted the patient's EKG/Telemetry data:  ECG 12 Lead Rhythm Change   Final Result   HEART ZUZM=424  bpm   RR Ozxdnygg=011  ms   ID Pecpzhaj=272  ms   P Horizontal Axis=  deg   P Front Axis=11  deg   QRSD Interval=88  ms   QT Coegvmgu=432  ms   VJdJ=744  ms   QRS Axis=-3  deg   T Wave Axis=9  deg   - BORDERLINE ECG -   Sinus tachycardia with irregular rate   Low voltage, extremity leads   Consider  anterior infarct   When compared with ECG of 30-Sep-2024 00:46:19,   Significant change in rhythm: previously sinus   Electronically Signed By: Rohan Jackson (MARVIN) 2024-10-04 08:04:39   Date and Time of Study:2024-10-02 09:50:00      ECG 12 Lead Tachycardia   Preliminary Result   HEART IEAQ=717  bpm   RR Gbxkqgqk=353  ms   ID Ksxuvzfn=970  ms   P Horizontal Axis=-69  deg   P Front Axis=23  deg   QRSD Interval=89  ms   QT Pojhzcuw=721  ms   KMlX=865  ms   QRS Axis=-9  deg   T Wave Axis=29  deg   - BORDERLINE ECG -   Sinus tachycardia   Low voltage, extremity leads   Consider anterior infarct   Date and Time of Study:2024-09-30 00:46:19      ECG 12 Lead Dyspnea   Preliminary Result   HEART GRMH=439  bpm   RR Gdtpcmdv=348  ms   ID Lupfxbwt=596  ms   P Horizontal Axis=7  deg   P Front Axis=54  deg   QRSD Interval=90  ms   QT Nvhekqxb=222  ms   YPiS=687  ms   QRS Axis=49  deg   T Wave Axis=39  deg   - OTHERWISE NORMAL ECG -   Sinus tachycardia   Low voltage, extremity leads   Date and Time of Study:2024-09-27 16:53:24      Telemetry Scan   Final Result      Telemetry Scan   Final  Result      Telemetry Scan   Final Result      Telemetry Scan   Final Result      Telemetry Scan   Final Result      Telemetry Scan   Final Result      Telemetry Scan   Final Result      Telemetry Scan   Final Result      Telemetry Scan   Final Result      Telemetry Scan   Final Result      Telemetry Scan   Final Result      Telemetry Scan   Final Result      Telemetry Scan   Final Result      Telemetry Scan   Final Result      Telemetry Scan   Final Result      Telemetry Scan   Final Result      Telemetry Scan   Final Result      Telemetry Scan   Final Result      Telemetry Scan   Final Result      Telemetry Scan   Final Result      Telemetry Scan   Final Result      Telemetry Scan   Final Result      Telemetry Scan   Final Result      Telemetry Scan   Final Result      Telemetry Scan   Final Result      Telemetry Scan   Final Result      Telemetry Scan   Final Result      Telemetry Scan   Final Result      Telemetry Scan   Final Result      Telemetry Scan   Final Result      Telemetry Scan   Final Result      Telemetry Scan   Final Result      Telemetry Scan   Final Result      Telemetry Scan   Final Result      Telemetry Scan   Final Result      Telemetry Scan   Final Result      Telemetry Scan   Final Result      Telemetry Scan   Final Result      Telemetry Scan   Final Result      Telemetry Scan   Final Result      Telemetry Scan   Final Result      Telemetry Scan   Final Result      Telemetry Scan   Final Result      Telemetry Scan   Final Result      Telemetry Scan   Final Result      Telemetry Scan   Final Result      Telemetry Scan   Final Result      Telemetry Scan   Final Result      Telemetry Scan   Final Result            Echocardiogram:    Results for orders placed during the hospital encounter of 09/27/24    Adult Transthoracic Echo Complete W/ Cont if Necessary Per Protocol    Interpretation Summary    Left ventricular systolic function is normal. Left ventricular ejection fraction appears to be 61 -  65%.    Left ventricular wall thickness is consistent with borderline concentric hypertrophy.    Left ventricular diastolic function was normal.    The right ventricular cavity is borderline dilated.    Estimated right ventricular systolic pressure from tricuspid regurgitation is mildly elevated (35-45 mmHg).    Mild pulmonary hypertension is present.        Stress Test:         Cardiac Catheterization:  No results found for this or any previous visit.         Other:         ASSESSMENT & PLAN:    Principal Problem:    Acute hypoxemic respiratory failure  Active Problems:    CML (chronic myelocytic leukemia)    History of pulmonary embolism    Medically noncompliant    Type 2 diabetes mellitus with diabetic polyneuropathy, with long-term current use of insulin    Sinus tachycardia    NICM (nonischemic cardiomyopathy)    Anemia due to chemotherapy    Acute on chronic HFrEF (heart failure with reduced ejection fraction)    Acute on chronic HFrEF (heart failure with reduced ejection fraction) secondary to NICM (nonischemic cardiomyopathy)  Bilateral lower extremity edema  Previous EF 26-30% in Nov. 2022.  Repeat echocardiogram September 2024 shows preserved LV function, ?normal diastolic function with mild pulmonary hypertension  Technically difficult stud but IVC dilated at 2.4 cm  Patient has been noncompliant with medications and outpatient follow up.  High-sensitivity troponin of 17 and 22, proBNP of 4000  Previously proBNP was 12,000  Remains on IV diuretics.  Renal function is normal with sodium 145 now  Monitor I's and O's and replace electrolytes as needed  She is also on losartan, Toprol-XL  Resume Jardiance  Emphasized importance of compliance with medications and follow-up  Low-salt diet discussed with the patient     Acute respiratory failure with hypoxia  Likely multifactorial, secondary to pneumonia, CHF, and anemia   On antibiotics  Remains on 5 L of oxygen  Wean as tolerated  Pulmonology is comanaging      Anemia due to chemotherapy  H&H 7.1/24.2  Xarelto is on hold per hematology  May need another blood transfusion     CML (chronic myelocytic leukemia)  On chemotherapy, followed by oncology   White count is down to 15,000  Blast crisis.  Started on hydroxyurea and allopurinol  Status post bone marrow biopsy  Hematology is following     Medically noncompliant  Compliance encouraged  / consulted   HF Nurse Navigator consulted as above     Type 2 diabetes mellitus with diabetic polyneuropathy, with long-term current use of insulin  A1c of 8.2  On Lantus and SSI     Sinus tachycardia  Due to underlying infection, anemia and hypoxia  Continue Toprol-XL 50 mg p.o. twice daily.  Heart rate has improved        Rohan Jackson MD  10/09/24  06:34 EDT

## 2024-10-09 NOTE — PROGRESS NOTES
Daily Progress Note        Acute hypoxemic respiratory failure    CML (chronic myelocytic leukemia)    History of pulmonary embolism    Medically noncompliant    Type 2 diabetes mellitus with diabetic polyneuropathy, with long-term current use of insulin    Sinus tachycardia    NICM (nonischemic cardiomyopathy)    Anemia due to chemotherapy    Acute on chronic HFrEF (heart failure with reduced ejection fraction)    Assessment:        Hypoxic respiratory insufficiency  Streptococcus, alphahemolytic bacteremia on 9/27/2024  Repeat blood cultures on 9/27/2024 and 10/4/2024 no growth  Sputum cultures negative  Legionella and urine strep negative  Expanded respiratory panel is negative     Diffuse bilateral groundglass opacities and alveolar infiltrates  Extract nuclear panel is negative  JOSE positive  ANCA panel negative    Significant leukocytosis with 45% blasts  History of CML  Afebrile    Chronic elevated right hemidiaphragm  Hypertension     Expanded respiratory panel and Legionella and strep urine antigen negative        2D echo 10-24    Left ventricular systolic function is normal. Left ventricular ejection fraction appears to be 61 - 65%.    Left ventricular wall thickness is consistent with borderline concentric hypertrophy.    Left ventricular diastolic function was normal.    The right ventricular cavity is borderline dilated.    Estimated right ventricular systolic pressure from tricuspid regurgitation is mildly elevated (35-45 mmHg).    Mild pulmonary hypertension is present.        Recommendations:       Augmentin finishing short course     Oxygen titration currently on 5 L    Discussed with oncology she is not a candidate for steroids     Diuresis Lasix 20 mg po daily                    LOS: 12 days     Subjective         Objective     Vital signs for last 24 hours:  Vitals:    10/08/24 1612 10/08/24 1915 10/08/24 2325 10/09/24 0355   BP: 115/74 124/72 98/65 105/68   BP Location: Right arm Left arm Left  arm Left arm   Patient Position: Sitting Sitting Lying Sitting   Pulse: 96 96 96 88   Resp: 16 16 16 14   Temp: 98.1 °F (36.7 °C) 98.4 °F (36.9 °C) 97.9 °F (36.6 °C) 98 °F (36.7 °C)   TempSrc: Oral Oral Oral Oral   SpO2: 95% 98% 93% 95%   Weight:       Height:           Intake/Output last 3 shifts:  No intake/output data recorded.  Intake/Output this shift:  No intake/output data recorded.      Radiology  Imaging Results (Last 24 Hours)       ** No results found for the last 24 hours. **            Labs:  Results from last 7 days   Lab Units 10/09/24  0234   WBC 10*3/mm3 14.93*   HEMOGLOBIN g/dL 7.1*   HEMATOCRIT % 24.2*   PLATELETS 10*3/mm3 101*     Results from last 7 days   Lab Units 10/09/24  0234   SODIUM mmol/L 145   POTASSIUM mmol/L 4.7   CHLORIDE mmol/L 107   CO2 mmol/L 29.3*   BUN mg/dL 41*   CREATININE mg/dL 0.80   CALCIUM mg/dL 8.4*   BILIRUBIN mg/dL 0.4   ALK PHOS U/L 250*   ALT (SGPT) U/L 10   AST (SGOT) U/L 16   GLUCOSE mg/dL 158*         Results from last 7 days   Lab Units 10/09/24  0234 10/08/24  0219 10/07/24  0246   ALBUMIN g/dL 3.5 3.6 3.7         Results from last 7 days   Lab Units 10/05/24  1329   JOSE  Positive*     Results from last 7 days   Lab Units 10/05/24  0518   MAGNESIUM mg/dL 1.8     Results from last 7 days   Lab Units 10/05/24  1538   INR  1.10   APTT seconds 24.5               Meds:   SCHEDULE  allopurinol, 200 mg, Oral, Q8H  amoxicillin-clavulanate, 1 tablet, Oral, Q12H  bumetanide, 1 mg, Intravenous, BID  gabapentin, 800 mg, Oral, Q8H  hydroxyurea, 1,000 mg, Oral, BID  insulin glargine, 28 Units, Subcutaneous, Nightly  insulin lispro, 2-7 Units, Subcutaneous, 4x Daily AC & at Bedtime  insulin lispro, 7 Units, Subcutaneous, TID With Meals  losartan, 12.5 mg, Oral, Q24H  metoprolol succinate XL, 50 mg, Oral, Q12H  mirtazapine, 15 mg, Oral, Nightly  [Held by provider] rivaroxaban, 10 mg, Oral, Daily  sodium chloride, 10 mL, Intravenous, Q12H      Infusions  pain,       PRNs     acetaminophen **OR** acetaminophen **OR** acetaminophen    albuterol    ALPRAZolam    senna-docusate sodium **AND** polyethylene glycol **AND** bisacodyl **AND** bisacodyl    Calcium Replacement - Follow Nurse / BPA Driven Protocol    dextrose    dextrose    Diclofenac Sodium    glucagon (human recombinant)    hydrALAZINE    Magnesium Standard Dose Replacement - Follow Nurse / BPA Driven Protocol    Morphine    ondansetron    Phosphorus Replacement - Follow Nurse / BPA Driven Protocol    Potassium Replacement - Follow Nurse / BPA Driven Protocol    [COMPLETED] Insert Peripheral IV **AND** sodium chloride    sodium chloride    sodium chloride    tiZANidine    Physical Exam:  Physical Exam  Cardiovascular:      Heart sounds: No murmur heard.     No gallop.   Pulmonary:      Effort: No respiratory distress.      Breath sounds: No stridor. Rhonchi and rales present. No wheezing.   Chest:      Chest wall: No tenderness.         ROS  Review of Systems   Respiratory:  Positive for cough and shortness of breath. Negative for wheezing and stridor.    Cardiovascular:  Positive for palpitations and leg swelling. Negative for chest pain.             Total time spent with patient greater than: 45 Minutes

## 2024-10-09 NOTE — SIGNIFICANT NOTE
10/09/24 1309   OTHER   Discipline physical therapy assistant   Rehab Time/Intention   Session Not Performed patient/family declined treatment;patient/family declined, not feeling well  (Pt declined PT session; pt educated on importance of PT)   Recommendation   PT - Next Appointment 10/10/24

## 2024-10-09 NOTE — PLAN OF CARE
Goal Outcome Evaluation:   Pt rested a few hours, now sitting on the side of the bed, vss, no complaints, call light within reach, POC ongoing.

## 2024-10-10 ENCOUNTER — HOME HEALTH ADMISSION (OUTPATIENT)
Dept: HOME HEALTH SERVICES | Facility: HOME HEALTHCARE | Age: 49
End: 2024-10-10
Payer: COMMERCIAL

## 2024-10-10 VITALS
RESPIRATION RATE: 16 BRPM | HEIGHT: 64 IN | TEMPERATURE: 98.4 F | DIASTOLIC BLOOD PRESSURE: 60 MMHG | OXYGEN SATURATION: 96 % | SYSTOLIC BLOOD PRESSURE: 108 MMHG | HEART RATE: 100 BPM | BODY MASS INDEX: 33.63 KG/M2 | WEIGHT: 197 LBS

## 2024-10-10 LAB
ALBUMIN SERPL-MCNC: 3.6 G/DL (ref 3.5–5.2)
ALBUMIN/GLOB SERPL: 1.1 G/DL
ALP SERPL-CCNC: 231 U/L (ref 39–117)
ALT SERPL W P-5'-P-CCNC: 10 U/L (ref 1–33)
AML TARGETGENE PANEL RESULT: NORMAL
ANION GAP SERPL CALCULATED.3IONS-SCNC: 9.5 MMOL/L (ref 5–15)
ANISOCYTOSIS BLD QL: ABNORMAL
AST SERPL-CCNC: 13 U/L (ref 1–32)
BASOPHILS # BLD MANUAL: 0.07 10*3/MM3 (ref 0–0.2)
BASOPHILS NFR BLD MANUAL: 1 % (ref 0–1.5)
BILIRUB SERPL-MCNC: 0.5 MG/DL (ref 0–1.2)
BLASTS NFR BLD MANUAL: 24 % (ref 0–0)
BLOOD OR BONE MARROW RESULT: NORMAL
BUN SERPL-MCNC: 44 MG/DL (ref 6–20)
BUN/CREAT SERPL: 51.8 (ref 7–25)
CALCIUM SPEC-SCNC: 8.1 MG/DL (ref 8.6–10.5)
CHLORIDE SERPL-SCNC: 106 MMOL/L (ref 98–107)
CO2 SERPL-SCNC: 29.5 MMOL/L (ref 22–29)
CREAT SERPL-MCNC: 0.85 MG/DL (ref 0.57–1)
DEPRECATED RDW RBC AUTO: 56.6 FL (ref 37–54)
EGFRCR SERPLBLD CKD-EPI 2021: 84.6 ML/MIN/1.73
ERYTHROCYTE [DISTWIDTH] IN BLOOD BY AUTOMATED COUNT: 16.7 % (ref 12.3–15.4)
GLOBULIN UR ELPH-MCNC: 3.4 GM/DL
GLUCOSE BLDC GLUCOMTR-MCNC: 187 MG/DL (ref 70–105)
GLUCOSE BLDC GLUCOMTR-MCNC: 215 MG/DL (ref 70–105)
GLUCOSE SERPL-MCNC: 225 MG/DL (ref 65–99)
HCT VFR BLD AUTO: 23.9 % (ref 34–46.6)
HGB BLD-MCNC: 7.3 G/DL (ref 12–15.9)
HYPOCHROMIA BLD QL: ABNORMAL
LYMPHOCYTES # BLD MANUAL: 2.8 10*3/MM3 (ref 0.7–3.1)
LYMPHOCYTES NFR BLD MANUAL: 2 % (ref 5–12)
MCH RBC QN AUTO: 28.6 PG (ref 26.6–33)
MCHC RBC AUTO-ENTMCNC: 30.5 G/DL (ref 31.5–35.7)
MCV RBC AUTO: 93.7 FL (ref 79–97)
METAMYELOCYTES NFR BLD MANUAL: 4 % (ref 0–0)
MONOCYTES # BLD: 0.13 10*3/MM3 (ref 0.1–0.9)
MYELOCYTES NFR BLD MANUAL: 6 % (ref 0–0)
NEUTROPHILS # BLD AUTO: 0.39 10*3/MM3 (ref 1.7–7)
NEUTROPHILS NFR BLD MANUAL: 5 % (ref 42.7–76)
NEUTS BAND NFR BLD MANUAL: 1 % (ref 0–5)
NRBC SPEC MANUAL: 1 /100 WBC (ref 0–0.2)
PLATELET # BLD AUTO: 64 10*3/MM3 (ref 140–450)
PMV BLD AUTO: 8.8 FL (ref 6–12)
POIKILOCYTOSIS BLD QL SMEAR: ABNORMAL
POTASSIUM SERPL-SCNC: 4.9 MMOL/L (ref 3.5–5.2)
PROMYELOCYTES NFR BLD MANUAL: 14 % (ref 0–0)
PROT SERPL-MCNC: 7 G/DL (ref 6–8.5)
QT INTERVAL: 275 MS
QTC INTERVAL: 420 MS
RBC # BLD AUTO: 2.55 10*6/MM3 (ref 3.77–5.28)
SCAN SLIDE: NORMAL
SMALL PLATELETS BLD QL SMEAR: ABNORMAL
SODIUM SERPL-SCNC: 145 MMOL/L (ref 136–145)
TARGETGENE RESULT: NORMAL
VARIANT LYMPHS NFR BLD MANUAL: 43 % (ref 19.6–45.3)
WBC MORPH BLD: NORMAL
WBC NRBC COR # BLD AUTO: 6.51 10*3/MM3 (ref 3.4–10.8)

## 2024-10-10 PROCEDURE — 25010000002 BUMETANIDE PER 0.5 MG: Performed by: HOSPITALIST

## 2024-10-10 PROCEDURE — 99232 SBSQ HOSP IP/OBS MODERATE 35: CPT | Performed by: INTERNAL MEDICINE

## 2024-10-10 PROCEDURE — 85007 BL SMEAR W/DIFF WBC COUNT: CPT | Performed by: NURSE PRACTITIONER

## 2024-10-10 PROCEDURE — 85025 COMPLETE CBC W/AUTO DIFF WBC: CPT | Performed by: NURSE PRACTITIONER

## 2024-10-10 PROCEDURE — 63710000001 INSULIN LISPRO (HUMAN) PER 5 UNITS: Performed by: STUDENT IN AN ORGANIZED HEALTH CARE EDUCATION/TRAINING PROGRAM

## 2024-10-10 PROCEDURE — 82948 REAGENT STRIP/BLOOD GLUCOSE: CPT | Performed by: STUDENT IN AN ORGANIZED HEALTH CARE EDUCATION/TRAINING PROGRAM

## 2024-10-10 PROCEDURE — 80053 COMPREHEN METABOLIC PANEL: CPT | Performed by: NURSE PRACTITIONER

## 2024-10-10 RX ORDER — ALLOPURINOL 200 MG/1
200 TABLET ORAL EVERY 8 HOURS
Start: 2024-10-10

## 2024-10-10 RX ORDER — METOPROLOL SUCCINATE 50 MG/1
50 TABLET, EXTENDED RELEASE ORAL EVERY 12 HOURS SCHEDULED
Start: 2024-10-10

## 2024-10-10 RX ORDER — HYDROXYUREA 500 MG/1
1000 CAPSULE ORAL 2 TIMES DAILY
Start: 2024-10-10

## 2024-10-10 RX ORDER — BLOOD SUGAR DIAGNOSTIC
1 STRIP MISCELLANEOUS
Qty: 100 EACH | Refills: 2 | Status: SHIPPED | OUTPATIENT
Start: 2024-10-10

## 2024-10-10 RX ORDER — BLOOD-GLUCOSE METER
1 EACH MISCELLANEOUS
Qty: 1 KIT | Refills: 0 | Status: SHIPPED | OUTPATIENT
Start: 2024-10-10

## 2024-10-10 RX ORDER — LOSARTAN POTASSIUM 25 MG/1
12.5 TABLET ORAL
Start: 2024-10-11

## 2024-10-10 RX ORDER — INSULIN LISPRO 100 [IU]/ML
7 INJECTION, SOLUTION INTRAVENOUS; SUBCUTANEOUS
Start: 2024-10-10

## 2024-10-10 RX ORDER — BUMETANIDE 0.25 MG/ML
1 INJECTION INTRAMUSCULAR; INTRAVENOUS EVERY 12 HOURS
Start: 2024-10-10

## 2024-10-10 RX ORDER — LANCETS
1 EACH MISCELLANEOUS
Qty: 100 EACH | Refills: 2 | Status: SHIPPED | OUTPATIENT
Start: 2024-10-10

## 2024-10-10 RX ADMIN — Medication 10 ML: at 08:13

## 2024-10-10 RX ADMIN — INSULIN LISPRO 3 UNITS: 100 INJECTION, SOLUTION INTRAVENOUS; SUBCUTANEOUS at 12:14

## 2024-10-10 RX ADMIN — BUMETANIDE 1 MG: 0.25 INJECTION INTRAMUSCULAR; INTRAVENOUS at 08:09

## 2024-10-10 RX ADMIN — LOSARTAN POTASSIUM 12.5 MG: 25 TABLET, FILM COATED ORAL at 08:09

## 2024-10-10 RX ADMIN — INSULIN LISPRO 7 UNITS: 100 INJECTION, SOLUTION INTRAVENOUS; SUBCUTANEOUS at 08:12

## 2024-10-10 RX ADMIN — ALLOPURINOL 200 MG: 100 TABLET ORAL at 02:20

## 2024-10-10 RX ADMIN — INSULIN LISPRO 7 UNITS: 100 INJECTION, SOLUTION INTRAVENOUS; SUBCUTANEOUS at 12:15

## 2024-10-10 RX ADMIN — AMOXICILLIN AND CLAVULANATE POTASSIUM 1 TABLET: 875; 125 TABLET, FILM COATED ORAL at 08:10

## 2024-10-10 RX ADMIN — GABAPENTIN 800 MG: 400 CAPSULE ORAL at 06:04

## 2024-10-10 RX ADMIN — RIVAROXABAN 10 MG: 20 TABLET, FILM COATED ORAL at 08:10

## 2024-10-10 RX ADMIN — HYDROXYUREA 1000 MG: 500 CAPSULE ORAL at 10:32

## 2024-10-10 RX ADMIN — ALLOPURINOL 200 MG: 100 TABLET ORAL at 10:32

## 2024-10-10 RX ADMIN — METOPROLOL SUCCINATE 50 MG: 50 TABLET, EXTENDED RELEASE ORAL at 08:10

## 2024-10-10 RX ADMIN — INSULIN LISPRO 2 UNITS: 100 INJECTION, SOLUTION INTRAVENOUS; SUBCUTANEOUS at 08:09
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
## 2024-10-10 NOTE — SIGNIFICANT NOTE
10/10/24 1341   Rehab Time/Intention   Session Not Performed   (pending transfer to [facility] for high dose of chemo)   Recommendation   PT - Next Appointment 10/11/24

## 2024-10-10 NOTE — PLAN OF CARE
Problem: Adult Inpatient Plan of Care  Goal: Plan of Care Review  Outcome: Met  Goal: Patient-Specific Goal (Individualized)  Outcome: Met  Goal: Absence of Hospital-Acquired Illness or Injury  Outcome: Met  Intervention: Identify and Manage Fall Risk  Recent Flowsheet Documentation  Taken 10/10/2024 1000 by Dorinda Menon RN  Safety Promotion/Fall Prevention: safety round/check completed  Taken 10/10/2024 0800 by Dorinda Menon RN  Safety Promotion/Fall Prevention: safety round/check completed  Intervention: Prevent Skin Injury  Recent Flowsheet Documentation  Taken 10/10/2024 0800 by Dorinda Menon RN  Body Position: position changed independently  Skin Protection:   adhesive use limited   transparent dressing maintained   tubing/devices free from skin contact  Intervention: Prevent and Manage VTE (Venous Thromboembolism) Risk  Recent Flowsheet Documentation  Taken 10/10/2024 0800 by Dorinda Menon RN  Activity Management: activity encouraged  VTE Prevention/Management:   sequential compression devices off   patient refused intervention  Range of Motion: active ROM (range of motion) encouraged  Intervention: Prevent Infection  Recent Flowsheet Documentation  Taken 10/10/2024 0800 by Dorinda Menon RN  Infection Prevention:   visitors restricted/screened   single patient room provided  Goal: Optimal Comfort and Wellbeing  Outcome: Met  Intervention: Provide Person-Centered Care  Recent Flowsheet Documentation  Taken 10/10/2024 0800 by Dorinda Menon RN  Trust Relationship/Rapport:   care explained   choices provided  Goal: Readiness for Transition of Care  Outcome: Met     Problem: Diabetes Comorbidity  Goal: Blood Glucose Level Within Targeted Range  Outcome: Met  Intervention: Monitor and Manage Glycemia  Recent Flowsheet Documentation  Taken 10/10/2024 0800 by Dorinda Menon RN  Glycemic Management: blood glucose monitored     Problem: Heart Failure Comorbidity  Goal: Maintenance of  Heart Failure Symptom Control  Outcome: Met  Intervention: Maintain Heart Failure-Management  Recent Flowsheet Documentation  Taken 10/10/2024 0800 by Dorinda Menon RN  Medication Review/Management: medications reviewed     Problem: Pain Chronic (Persistent) (Comorbidity Management)  Goal: Acceptable Pain Control and Functional Ability  Outcome: Met  Intervention: Manage Persistent Pain  Recent Flowsheet Documentation  Taken 10/10/2024 0800 by Dorinda Menon RN  Medication Review/Management: medications reviewed  Intervention: Optimize Psychosocial Wellbeing  Recent Flowsheet Documentation  Taken 10/10/2024 0800 by Dorinda Menon RN  Supportive Measures: active listening utilized  Diversional Activities: television  Family/Support System Care: support provided     Problem: Skin Injury Risk Increased  Goal: Skin Health and Integrity  Outcome: Met  Intervention: Optimize Skin Protection  Recent Flowsheet Documentation  Taken 10/10/2024 0800 by Dorinda Menon RN  Pressure Reduction Techniques: frequent weight shift encouraged  Head of Bed (HOB) Positioning: HOB elevated  Pressure Reduction Devices: positioning supports utilized  Skin Protection:   adhesive use limited   transparent dressing maintained   tubing/devices free from skin contact     Problem: Fall Injury Risk  Goal: Absence of Fall and Fall-Related Injury  Outcome: Met  Intervention: Identify and Manage Contributors  Recent Flowsheet Documentation  Taken 10/10/2024 0800 by Dorinda Menon RN  Medication Review/Management: medications reviewed  Self-Care Promotion: independence encouraged  Intervention: Promote Injury-Free Environment  Recent Flowsheet Documentation  Taken 10/10/2024 1000 by Dorinda Menon RN  Safety Promotion/Fall Prevention: safety round/check completed  Taken 10/10/2024 0800 by Dorinda Menon RN  Safety Promotion/Fall Prevention: safety round/check completed     Problem: Adjustment to Illness (Sepsis/Septic  Shock)  Goal: Optimal Coping  Outcome: Met  Intervention: Optimize Psychosocial Adjustment to Illness  Recent Flowsheet Documentation  Taken 10/10/2024 0800 by Dorinda Menon RN  Supportive Measures: active listening utilized  Family/Support System Care: support provided     Problem: Bleeding (Sepsis/Septic Shock)  Goal: Absence of Bleeding  Outcome: Met     Problem: Glycemic Control Impaired (Sepsis/Septic Shock)  Goal: Blood Glucose Level Within Desired Range  Outcome: Met  Intervention: Optimize Glycemic Control  Recent Flowsheet Documentation  Taken 10/10/2024 0800 by Dorinda Menon RN  Glycemic Management: blood glucose monitored     Problem: Infection Progression (Sepsis/Septic Shock)  Goal: Absence of Infection Signs and Symptoms  Outcome: Met  Intervention: Initiate Sepsis Management  Recent Flowsheet Documentation  Taken 10/10/2024 0800 by Dorinda Menon RN  Infection Prevention:   visitors restricted/screened   single patient room provided  Isolation Precautions:   precautions maintained   protective  Intervention: Promote Recovery  Recent Flowsheet Documentation  Taken 10/10/2024 0800 by Dorinda Menon RN  Activity Management: activity encouraged     Problem: Nutrition Impaired (Sepsis/Septic Shock)  Goal: Optimal Nutrition Intake  Outcome: Met   Goal Outcome Evaluation:   Pt to transfer to [facility] for further treatment. Pts IV remained in per request from [facility]. Discharge paperwork was discussed with pt and family. Report was called to [facility]. Paperwork was sent with the pt upon discharge. Pt will be transporting via EMS. IV Bumex was given along with sliding scale insulin. Pt also received hydroxyurea. No concerns.
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
## 2024-10-10 NOTE — CASE MANAGEMENT/SOCIAL WORK
Social Work Assessment  [facility]     Patient Name: Nieves Mc  MRN: [id number]  Today's Date: 10/10/2024    Admit Date: 9/27/2024     Discharge Plan       Row Name 10/10/24 1228       Plan    Plan Transfer to [facility] for up to 21 days of chemotherapy.    Plan Comments SW met with patient at bedside.  She was sitting on edge of bed leaning over her BST.  Patient wrapped in a blanket, shivering.    SW  spoke with patient about home situation.  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  Her daughter works 3rd shift so patient gets her grandson on/off the bus, feeds him and gets him to bed.  Patient does not like having to choose caring for her grandson over her own personal care needs.   Although there are several family members, patient stated there is no one to care for the grandson so her daughter will have to resign from her job.  She is upset about many family issues.  Son arrived and was angry that no one was in room with patient when [place] [facility] [place] was closed for privacy. He is worried about patient fall hx.  SW notified nursing staff.  Patient then transferred safely back to her bed.  LORENZO provided support and encouragement.              BLANCHE Ni MSW    Phone: [phone]  Cell: [phone]  Fax: [phone]  [email or web address]

## 2024-10-10 NOTE — DISCHARGE SUMMARY
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  Insulin requiring diabetes Type 2  Chronic anxiety  Medical noncompliance in the past with her oncology maintenance therapy  History of pulmonary emboli  Chronic anticoagulation resumed after being held during bone marrow  Strep bacteremia/sepsis  Anemia  Lower extremity edema    Date of Discharge: 10/10/2024  Primary Care Physician: Pankaj Stover MD      Presenting Problem:   Hypoxia [R09.02]  Acute hypoxemic respiratory failure [J96.01]  Pneumonia due to infectious organism, unspecified laterality, unspecified part of lung [J18.9]    Active and Resolved Hospital Problems:  Active Hospital Problems    Diagnosis POA    **Acute hypoxemic respiratory failure [J96.01] Yes    Acute on chronic HFrEF (heart failure with reduced ejection fraction) [I50.23] Yes    Sinus tachycardia [R00.0] Yes    Anemia due to chemotherapy [D64.81, T45.1X5A] Yes    NICM (nonischemic cardiomyopathy) [I42.8] Yes    Type 2 diabetes mellitus with diabetic polyneuropathy, with long-term current use of insulin [E11.42, Z79.4] Not Applicable    Medically noncompliant [Z91.199] Not Applicable    History of pulmonary embolism [Z86.711] Yes    CML (chronic myelocytic leukemia) [C92.10] Yes      Resolved Hospital Problems   No resolved problems to display.         Hospital Course     HPI:  As per history and physical of 2024    Hospital Course:   This is a unfortunate 48-year-old white female with a longstanding history of chronic myelogenous leukemia currently followed as an outpatient by hematology oncology.  She presented  with worsening shortness of breath as well as significant edema.  She had no true temperature spike during her hospitalization.  She persisted with her need for nasal cannula oxygen at 5 L/min.  She had had no oxygen requirements prior.  Blood pressures remained well-controlled.  Initial blood cultures returned showing evidence of alphahemolytic strep.  Her upper respiratory viral panel was negative.  Legionella and strep pneumonia urinary antigens were negative.  Mycoplasma titer was negative.  Urine was without growth.  Repeat blood culture showed no bacterial growth.  She was initially treated with cefepime and subsequently transition to oral antibiotics.  Initial chest x-ray suggested airspace opacities possibly associated with infectious process.  Lower extremity venous Doppler imaging failed to show any evidence of DVT.  Echocardiogram showed a preserved ejection fraction without severe valvular abnormalities.  CT scanning of chest showed evidence of multifocal pneumonic process.  She was actively followed by hematology oncology and subsequently had repeat bone marrow performed on August 7, 2024.  She had her initial presenting anemia and did require blood transfusion during her hospitalization.  Electrolytes remained unremarkable.  Liver function showed no severe elevation.  Ferritin level was significantly elevated and felt to be an acute phase reactant.  Her antinuclear antibody did returned being positive with a negative ANCA panel.  She persisted with her leukocytosis.  Bone marrow results returned being consistent with an acute myeloid leukemia.  Oncology spoke to hematology at bone marrow transplant center at the [facility] who accepted patient in transfer.  After agreement of patient does she subsequently was transferred for ongoing care.          Day of Discharge     Vital Signs:  Temp:  [97.6 °F (36.4 °C)-98.4 °F (36.9 °C)] 98.4 °F (36.9 °C)  Heart Rate:  [] 96  Resp:  [16-20]  16  BP: ()/(56-72) 108/63  Flow (L/min):  [5] 5    Physical Exam:  Physical Exam  Vitals reviewed.   Constitutional:       Appearance: Normal appearance. She is obese.   HENT:      Head: Normocephalic.   Cardiovascular:      Rate and Rhythm: Normal rate and regular rhythm.   Pulmonary:      Effort: Pulmonary effort is normal.      Breath sounds: Normal breath sounds.   Abdominal:      Palpations: Abdomen is soft.      Tenderness: There is no abdominal tenderness.   Musculoskeletal:         General: Swelling present.      Comments: 2 to 3 plus edema   Neurological:      Mental Status: She is alert.            Pertinent  and/or Most Recent Results     LAB RESULTS:      Lab 10/10/24  0441 10/09/24  0234 10/08/24  0219 10/07/24  0246 10/06/24  1211 10/05/24  2339 10/05/24  1538 10/05/24  1329 10/05/24  0518 10/04/24  1545 10/04/24  0330   WBC 6.51 14.93* 29.03* 41.38* 42.04*   < >  --   --  42.17*   < > 28.17*   HEMOGLOBIN 7.3* 7.1* 7.3* 7.3* 7.7*   < >  --   --  7.3*   < > 7.5*   HEMATOCRIT 23.9* 24.2* 24.8* 24.1* 26.1*   < >  --   --  24.5*   < > 25.4*   PLATELETS 64* 101* 131* 174 136*   < >  --   --  157   < > 201   NEUTROS ABS 0.39* 2.54 2.90 4.97 2.52   < >  --   --  5.06   < > 0.85*   EOS ABS  --  0.15  --  0.83* 0.42*  --   --   --  0.84*  --  0.28   MCV 93.7 93.1 92.5 93.8 92.9   < >  --   --  94.2   < > 93.4   SED RATE  --   --   --   --   --   --   --  45*  --   --   --    LDH  --   --   --   --  784*  --   --   --  616*  --   --    PROTIME  --   --   --   --   --   --  11.9*  --   --   --   --    APTT  --   --   --   --   --   --  24.5  --   --   --   --     < > = values in this interval not displayed.         Lab 10/10/24  0441 10/09/24  0234 10/08/24  0219 10/07/24  0246 10/06/24  1211 10/05/24  1329 10/05/24  0518   SODIUM 145 145 145 146* 143   < > 140   POTASSIUM 4.9 4.7 4.4 4.5 4.9   < > 4.6   CHLORIDE 106 107 107 107 106   < > 104   CO2 29.5* 29.3* 27.9 28.4 27.9   < > 26.3   ANION GAP 9.5 8.7  10.1 10.6 9.1   < > 9.7   BUN 44* 41* 39* 41* 41*   < > 41*   CREATININE 0.85 0.80 0.90 0.99 1.06*   < > 1.44*   EGFR 84.6 91.0 79.0 70.5 64.9   < > 45.0*   GLUCOSE 225* 158* 174* 161* 125*   < > 205*   CALCIUM 8.1* 8.4* 8.8 8.8 8.7   < > 8.9   MAGNESIUM  --   --   --   --   --   --  1.8   PHOSPHORUS  --   --   --   --   --   --  3.9    < > = values in this interval not displayed.         Lab 10/10/24  0441 10/09/24  0234 10/08/24  0219 10/07/24  0246 10/06/24  1211   TOTAL PROTEIN 7.0 6.8 7.1 7.3 7.4   ALBUMIN 3.6 3.5 3.6 3.7 3.8   GLOBULIN 3.4 3.3 3.5 3.6 3.6   ALT (SGPT) 10 10 11 11 13   AST (SGOT) 13 16 21 19 19   BILIRUBIN 0.5 0.4 0.4 0.3 0.4   ALK PHOS 231* 250* 275* 318* 330*         Lab 10/05/24  1538   PROTIME 11.9*   INR 1.10             Lab 10/09/24  1323   ABO TYPING A   RH TYPING Positive   ANTIBODY SCREEN Negative         Brief Urine Lab Results  (Last result in the past 365 days)        Color   Clarity   Blood   Leuk Est   Nitrite   Protein   CREAT   Urine HCG        10/04/24 0934 Yellow   Hazy  Comment: Result checked     Trace   Negative   Negative   100 mg/dL (2+)                 Microbiology Results (last 10 days)       Procedure Component Value - Date/Time    Blood Culture - Blood, Hand, Left [[id number]]  (Normal) Collected: 10/04/24 1111    Lab Status: Final result Specimen: Blood from Hand, Left Updated: 10/09/24 1130     Blood Culture No growth at 5 days    Blood Culture - Blood, Hand, Right [[id number]]  (Normal) Collected: 10/04/24 1111    Lab Status: Final result Specimen: Blood from Hand, Right Updated: 10/09/24 1130     Blood Culture No growth at 5 days    Urine Culture - Urine, Urine, Clean Catch [[id number]]  (Normal) Collected: 10/04/24 0934    Lab Status: Final result Specimen: Urine, Clean Catch Updated: 10/05/24 1325     Urine Culture No growth            CT Bone marrow biopsy and aspiration    Result Date: 10/7/2024  Impression: Technically successful bone marrow aspiration and  biopsy of the left posterior superior iliac spine utilizing CT fluoroscopic guidance. Report dictated by: Akira Killian DNP  I have personally reviewed this case and agree with the findings above: Electronically Signed: Brady Dodd MD  10/7/2024 3:09 PM EDT  Workstation ID: [id number]    CT Chest Without Contrast Diagnostic    Result Date: 10/4/2024  Impression: 1.Evidence for recurrent or progressing atypical/viral infection versus multifocal pneumonia. Other chronic inflammatory conditions with waxing and waning presentation could potentially be considered such as sarcoidosis. Recommend correlation for signs or symptoms of acute infection and follow-up to ensure improvement/resolution. Electronically Signed: Brian Marquez MD  10/4/2024 4:57 PM EDT  Workstation ID: [id number]    XR Chest 1 View    Result Date: 10/1/2024  Impression: Impression: Stable patchy multifocal airspace infiltrates interstitial changes throughout the lungs bilaterally. Electronically Signed: Brian Marquez MD  10/1/2024 2:25 PM EDT  Workstation ID: [id number]    XR Chest 1 View    Result Date: 9/27/2024  Impression: Impression: Patchy right greater than left airspace opacities which may represent infection. Enlarged cardiac silhouette. Electronically Signed: Herbert Hutchinson MD  9/27/2024 5:21 PM EDT  Workstation ID: [id number]     Results for orders placed during the hospital encounter of 09/27/24    Duplex Venous Lower Extremity - Bilateral CAR    Interpretation Summary    Normal bilateral lower extremity venous duplex scan.      Results for orders placed during the hospital encounter of 09/27/24    Duplex Venous Lower Extremity - Bilateral CAR    Interpretation Summary    Normal bilateral lower extremity venous duplex scan.      Results for orders placed during the hospital encounter of 09/27/24    Adult Transthoracic Echo Complete W/ Cont if Necessary Per Protocol    Interpretation Summary    Left ventricular systolic function is normal. Left  ventricular ejection fraction appears to be 61 - 65%.    Left ventricular wall thickness is consistent with borderline concentric hypertrophy.    Left ventricular diastolic function was normal.    The right ventricular cavity is borderline dilated.    Estimated right ventricular systolic pressure from tricuspid regurgitation is mildly elevated (35-45 mmHg).    Mild pulmonary hypertension is present.      Labs Pending at Discharge:  Pending Labs       Order Current Status    Comprehensive Hematopathology Evaluation ([facility]) Collected (10/07/24 1148)    Bone Marrow Exam In process    Bone Marrow Exam In process    Cytogenetics ([facility]) In process            Procedures Performed           Consults:   Consults       Date and Time Order Name Status Description    10/4/2024  9:26 AM Hematology & Oncology Inpatient Consult Completed     10/3/2024  8:51 AM Inpatient Pulmonology Consult Completed     9/28/2024  7:34 AM Inpatient Cardiology Consult Completed     9/27/2024  6:43 PM Hospitalist (on-call MD unless specified)                Discharge Details        Discharge Medications        New Medications        Instructions Start Date   Accu-Chek Guide Me w/Device kit   1 kit, Does not apply, 3 Times Daily Before Meals, Dx code: E11.65  NDC [address]-16  Bin#: [id number] Group #: [id number]  ID #: [id number]  Issuer #: ([id number])      Accu-Chek Guide test strip  Generic drug: glucose blood   1 each, Other, 3 Times Daily Before Meals, Use as instructed Dx code: E11.65      Accu-Chek Softclix Lancets lancets   1 each, Other, 3 Times Daily Before Meals, Use as instructed Dx code: E11.65      Allopurinol 200 MG tablet   200 mg, Oral, Every 8 Hours      amoxicillin-clavulanate 875-125 MG per tablet  Commonly known as: AUGMENTIN   1 tablet, Oral, Every 12 Hours Scheduled      bumetanide 0.25 MG/ML injection  Commonly known as: BUMEX   1 mg, Intravenous, Every 12 Hours      hydroxyurea 500 MG capsule  Commonly  known as: HYDREA   1,000 mg, Oral, 2 Times Daily      insulin lispro 100 UNIT/ML injection  Commonly known as: HUMALOG/ADMELOG  Replaces: Insulin Lispro (1 Unit Dial) 100 UNIT/ML solution pen-injector   7 Units, Subcutaneous, 3 Times Daily With Meals      losartan 25 MG tablet  Commonly known as: COZAAR   12.5 mg, Oral, Every 24 Hours Scheduled   Start Date: October 11, 2024     metoprolol succinate XL 50 MG 24 hr tablet  Commonly known as: TOPROL-XL   50 mg, Oral, Every 12 Hours Scheduled             Changes to Medications        Instructions Start Date   tiZANidine 4 MG tablet  Commonly known as: ZANAFLEX  What changed:   when to take this  reasons to take this   4 mg, Oral, Every 8 Hours PRN             Continue These Medications        Instructions Start Date   acetaminophen 325 MG tablet  Commonly known as: TYLENOL   650 mg, Oral, Every 4 Hours PRN      ALPRAZolam 0.5 MG tablet  Commonly known as: Xanax   0.5 mg, Oral, Nightly PRN      Diclofenac Sodium 1 % gel gel  Commonly known as: VOLTAREN   4 g, Topical, 4 Times Daily PRN      gabapentin 800 MG tablet  Commonly known as: Neurontin   800 mg, Oral, 3 Times Daily, Ordered through ASa[facility]l Crain      Insulin Glargine 100 UNIT/ML injection pen  Commonly known as: LANTUS SOLOSTAR   28 Units, Subcutaneous, Nightly      mirtazapine 15 MG tablet  Commonly known as: REMERON   15 mg, Oral, Nightly      pain patient supplied pump   Intrathecal, Continuous      Xarelto 10 MG tablet  Generic drug: rivaroxaban   10 mg, Oral, Daily             Stop These Medications      doxycycline 100 MG tablet  Commonly known as: VIBRAMYICN     furosemide 40 MG tablet  Commonly known as: LASIX     Iclusig 10 MG tablet  Generic drug: PONATinib HCl     Insulin Lispro (1 Unit Dial) 100 UNIT/ML solution pen-injector  Commonly known as: HumaLOG KwikPen  Replaced by: insulin lispro 100 UNIT/ML injection     levoFLOXacin 500 MG tablet  Commonly known as: Levaquin              Allergies    Allergen Reactions    Contrast Dye (Echo Or Unknown Ct/Mr) Shortness Of Breath and Nausea And Vomiting     Flushed          Discharge Disposition:  [facility]  Short Term Hospital (DC - External)    Diet:  Hospital:  Diet Order   Procedures    Diet: Cardiac; Healthy Heart (2-3 Na+); Fluid Consistency: Thin (IDDSI 0)         Discharge Activity:         CODE STATUS:  Code Status and Medical Interventions: CPR (Attempt to Resuscitate); Full Support   Ordered at: 09/28/24 0443     Code Status (Patient has no pulse and is not breathing):    CPR (Attempt to Resuscitate)     Medical Interventions (Patient has pulse or is breathing):    Full Support         Future Appointments   Date Time Provider Department Center   10/10/2024  1:00 PM LAB [facility] LAB [facility]   10/17/2024  1:05 PM LAB [facility]   10/24/2024  1:00 PM LAB [facility]   10/31/2024  1:00 PM LAB [facility]   11/7/2024 11:55 AM LAB MD [facility] LAB [facility]   11/7/2024 12:00 PM Meghann Lerma MD [facility] ONC [facility]   11/14/2024  1:00 PM LAB [facility] LAB [facility]   11/21/2024  1:00 PM LAB [facility] LAB [facility]   11/27/2024  1:00 PM LAB [facility] LAB [facility]   12/5/2024  1:05 PM LAB [facility] LAB [facility]   12/12/2024  1:00 PM LAB [facility]   12/19/2024 12:10 PM LAB MD [facility] LAB [facility]   12/19/2024 12:15 PM Meghann Lerma MD [facility]   12/26/2024  1:00 PM LAB [facility] LAB [facility]   1/2/2025  1:00 PM LAB [facility] LAB [facility]   1/9/2025  1:00 PM LAB [facility] LAB [facility]   1/16/2025  1:00 PM LAB [facility] LAB [facility]   1/23/2025  1:05 PM LAB [facility] ONC LAB NA [facility]   1/30/2025 12:10 PM LAB MD [facility] ONC LAB NA [facility]   1/30/2025 12:15 PM Meghann Lerma MD [facility] ONC NA  [facility]           Time spent on Discharge including face to face service:  45 minutes    Signature: Electronically signed by Timothy Duane Brammell, MD, 10/10/24, 11:10 EDT.  [facility] Hospitalist Team

## 2024-10-10 NOTE — PROGRESS NOTES
Hematology/Oncology Inpatient Progress Note    PATIENT NAME: Nieves Mc  : 1975  MRN: [id number]    CHIEF COMPLAINT: Hypoxia    HISTORY OF PRESENT ILLNESS:      Nieves Mc is a 48 y.o. female who presented to [facility] on 2024 with complaints of shortness of breath.  She was found to hypoxic with SpO2 76% while undergoing evaluation for blood transfusion.  She was subsequently sent to the ED for further evaluation.  She does report bilateral leg swelling.  In the ED she was saturating 86% and requiring 3 L nasal cannula to maintain PaO2 over 90%.  She was mildly tachycardic, hypertensive and afebrile.  Chest x-ray completed showed patchy right greater than left airspace opacities.  CBC was notable for neutropenia, anemia.  She was started on IV antibiotics.  Patient is now needing 5 L oxygen.  Cardiology and pulmonology have been consulted.  It appears she has been noncompliant with her cardiac medications and her cardiology outpatient follow-up.  Interval chest x-ray on 10/1/2024 showed stable patchy multifocal airspace infiltrates interstitial changes throughout the lungs bilaterally.  She had duplex ultrasound bilateral lower extremities due to swelling and this was normal.      10/04/24  Hematology/Oncology was consulted.  She is established with Dr. Lerma for CML.  She is currently on treatment with ponatinib, but most recent office visit with Dr. Lerma she had stated she has been noncompliant over the last 3 to 4 weeks.  She was encouraged at that visit to restart her treatment.    10/5/24: Pt seen today for follow up. Reported that her shortness of breath has improved. However continues to be hypoxic off Oxygen support. She is currently on IV antibiotics.    Subjective     Family at her bedside.  Patient still declines being transferred to the bone marrow unit for AML    Patient has now agreed to transfer to [facility] bone marrow unit for treatment of  "AML    ROS:  Review of Systems   Constitutional:  Positive for fatigue.   HENT: Negative.     Eyes: Negative.    Respiratory:  Positive for shortness of breath.    Cardiovascular: Negative.    Gastrointestinal: Negative.    Endocrine: Negative.    Genitourinary: Negative.    Musculoskeletal: Negative.    Skin: Negative.    Allergic/Immunologic: Negative.    Neurological: Negative.    Hematological: Negative.    Psychiatric/Behavioral: Negative.          MEDICATIONS:    Scheduled Meds:       Continuous Infusions:  No current facility-administered medications for this encounter.     PRN Meds:       ALLERGIES:    Allergies   Allergen Reactions    Contrast Dye (Echo Or Unknown Ct/Mr) Shortness Of Breath and Nausea And Vomiting     Flushed        Objective    VITALS:   /60 (BP Location: Left arm, Patient Position: Sitting)   Pulse 100   Temp 98.4 °F (36.9 °C) (Oral)   Resp 16   Ht 162.6 cm (64\")   Wt 89.4 kg (197 lb)   LMP 05/25/2022 (Approximate)   SpO2 96%   BMI 33.81 kg/m²     PHYSICAL EXAM: (performed by MD)  Physical Exam  Constitutional:       Appearance: She is normal weight. She is ill-appearing.   HENT:      Head: Normocephalic and atraumatic.      Right Ear: External ear normal.      Left Ear: External ear normal.      Nose: Nose normal.      Mouth/Throat:      Mouth: Mucous membranes are moist.      Pharynx: Oropharynx is clear.   Eyes:      Extraocular Movements: Extraocular movements intact.      Conjunctiva/sclera: Conjunctivae normal.      Pupils: Pupils are equal, round, and reactive to light.   Cardiovascular:      Rate and Rhythm: Normal rate.      Pulses: Normal pulses.   Pulmonary:      Effort: Pulmonary effort is normal.   Abdominal:      General: Abdomen is flat.      Palpations: Abdomen is soft.   Musculoskeletal:         General: Normal range of motion.      Cervical back: Normal range of motion and neck supple.   Skin:     General: Skin is warm.   Neurological:      Mental Status: " She is alert.   Psychiatric:         Mood and Affect: Mood normal.         Behavior: Behavior normal.         Thought Content: Thought content normal.         Judgment: Judgment normal.     I have reexamined the patient and the results are consistent with the previously documented exam. Meghann Lerma MD        RECENT LABS:    Lab Results (last 24 hours)       Procedure Component Value Units Date/Time    FISH TargetGene - Bone Marrow, Iliac [[id number]] Collected: 10/07/24 1148    Specimen: Bone Marrow from Iliac Updated: 10/10/24 1221     TargetGene Result Comment^Text^TXT     Comment:  Refer to FISH - AML TargetGene Panel for complete result  Performed at:  [facility]  [address], [place] [address]  :  Molly Vega M.D., Ph.D., Phone:  [phone]       Narrative:      Performed at:  [facility]  [address], [place] [address]  :  Molly Vega M.D., Ph.D., Phone:  [phone]    AML Profile, FISH [[id number]] Collected: 10/07/24 1148    Specimen: Bone Marrow from Iliac Updated: 10/10/24 1221     AML TargetGene Panel Result Comment^Text^TXT     Comment: Result: No assay specific abnormalities detected by AML   Panel  DISCLAIMER: REFER TO HARDCOPY OR PDF FOR COMPLETE RESULT.   If synopsis provided, clinical decisions should not be   based on this interfaced synopsis alone.       Narrative:      Performed at:  [facility]  [address], [place] [address]  :  Molly Vega M.D., Ph.D., Phone:  [phone]    POC Glucose 4x Daily Before Meals & at Bedtime [[id number]]  (Abnormal) Collected: 10/10/24 1139    Specimen: Blood Updated: 10/10/24 1141     Glucose 215 mg/dL      Comment: Serial Number: [id number]:  [id number]       POC Glucose 4x Daily Before Meals & at Bedtime [[id number]]  (Abnormal)  Collected: 10/10/24 0739    Specimen: Blood Updated: 10/10/24 0742     Glucose 187 mg/dL      Comment: Serial Number: [id number]:  [id number]       CBC & Differential [[id number]]  (Abnormal) Collected: 10/10/24 0441    Specimen: Blood Updated: 10/10/24 0649    Narrative:      The following orders were created for panel order CBC & Differential.  Procedure                               Abnormality         Status                     ---------                               -----------         ------                     CBC Auto Differential[[id number]]        Abnormal            Final result               Scan Slide[[id number]]                                       Final result                 Please view results for these tests on the individual orders.    CBC Auto Differential [[id number]]  (Abnormal) Collected: 10/10/24 0441    Specimen: Blood Updated: 10/10/24 0649     WBC 6.51 10*3/mm3      RBC 2.55 10*6/mm3      Hemoglobin 7.3 g/dL      Hematocrit 23.9 %      MCV 93.7 fL      MCH 28.6 pg      MCHC 30.5 g/dL      RDW 16.7 %      RDW-SD 56.6 fl      MPV 8.8 fL      Platelets 64 10*3/mm3     Scan Slide [[id number]] Collected: 10/10/24 0441    Specimen: Blood Updated: 10/10/24 0649     Scan Slide --     Comment: See Manual Differential Results       Manual Differential [[id number]]  (Abnormal) Collected: 10/10/24 0441    Specimen: Blood Updated: 10/10/24 0649     Neutrophil % 5.0 %      Lymphocyte % 43.0 %      Monocyte % 2.0 %      Basophil % 1.0 %      Bands %  1.0 %      Metamyelocyte % 4.0 %      Myelocyte % 6.0 %      Promyelocyte % 14.0 %      Blasts % 24.0 %      Neutrophils Absolute 0.39 10*3/mm3      Lymphocytes Absolute 2.80 10*3/mm3      Monocytes Absolute 0.13 10*3/mm3      Basophils Absolute 0.07 10*3/mm3      nRBC 1.0 /100 WBC      Anisocytosis Slight/1+     Hypochromia Slight/1+     Poikilocytes Slight/1+     WBC Morphology Normal     Platelet Estimate Decreased    Narrative:      Reviewed by  Pathologist within the past 30 days on [id number] .      Comprehensive Metabolic Panel [[id number]]  (Abnormal) Collected: 10/10/24 0441    Specimen: Blood Updated: 10/10/24 0532     Glucose 225 mg/dL      BUN 44 mg/dL      Creatinine 0.85 mg/dL      Sodium 145 mmol/L      Potassium 4.9 mmol/L      Chloride 106 mmol/L      CO2 29.5 mmol/L      Calcium 8.1 mg/dL      Total Protein 7.0 g/dL      Albumin 3.6 g/dL      ALT (SGPT) 10 U/L      AST (SGOT) 13 U/L      Alkaline Phosphatase 231 U/L      Total Bilirubin 0.5 mg/dL      Globulin 3.4 gm/dL      A/G Ratio 1.1 g/dL      BUN/Creatinine Ratio 51.8     Anion Gap 9.5 mmol/L      eGFR 84.6 mL/min/1.73     Narrative:      GFR Normal >60  Chronic Kidney Disease <60  Kidney Failure <15      POC Glucose Once [[id number]]  (Abnormal) Collected: 10/09/24 2107    Specimen: Blood Updated: 10/09/24 2109     Glucose 144 mg/dL      Comment: Serial Number: [id number]:  [id number]       BCR / ABL By RT-PCR [[id number]] Collected: 10/06/24 1211    Specimen: Blood from Arm, Right Updated: 10/09/24 1946     BCR ABL Result Comment^Text^TXT     Comment: Positive for BCR-ABL1 transcripts, b3a2 and b2a2 (p210),   and e1a2 (p190)  DISCLAIMER: REFER TO HARDCOPY OR PDF FOR COMPLETE RESULT.   If synopsis provided, clinical decisions should not be   based on this interfaced synopsis alone.  Performed at:  [facility]  [address] [place] [address]  :  Molly Vega M.D., Ph.D., Phone:  [phone]       Narrative:      Performed at:  [facility]  [address] [address]  :  Molly Vega M.D., Ph.D., Phone:  [phone]    POC Glucose 4x Daily Before Meals & at Bedtime [[id number]]  (Abnormal) Collected: 10/09/24 1730    Specimen: Blood Updated: 10/09/24 1732     Glucose 152 mg/dL      Comment: Serial Number: [id number]:  [id number]                PENDING RESULTS:     IMAGING REVIEWED:  No radiology results for the last day    Assessment & Plan :      ASSESSMENT:    Blast phase phase CML:       -patient was reportedly on ponatinib 10 mg every other day due to poor tolerance on higher dose.   -As per chart review, She has not been compliant and was encouraged to restart at last visit 9/26/2024.  Although patient disputed this and stated that she has been compliant except for last 1-2 weeks.  -Ponatinib on hold for now.   -labs reviewed, Noted to have uptrending Blast count and immature forms, 45% blasts noted.  -Coag parameters normal. S. Mg and P levels WNL. Renal function is stable.  -Fibrinogen levels are sent, pending. Flow Cytometry is pending.  -overall picture is concerning for transformation to blast crisis given >30% blasts in peripheral circulation.  -Will plan to start her on Rasburicase 6mg Daily,  -She has low Hb 7.3 and Plt count 157, Will start at Hydrea 1g BID and monitor CBC closely.   -discussed with [facility] BMT service for potential transfer of the patient to BMT service, she was not considered a candidate for transfer at this time due to underlying multiple medical comorbidities and while awaiting further workup.  -Will plan for repeat Bone marrow biopsy tomorrow to further evaluate. This was discussed with pt in detail.  Bone marrow aspiration and biopsy ordered for today.  Reviewed with patient  Continue current treatment  Continue allopurinol    Status post bone marrow aspiration and biopsy which confirms blast phase of CML at 47%.  Recommend transfer to [facility] bone marrow unit for high-dose chemo.  Patient at this point in hesitating she will think about it and discuss further with her family and then make decision    Patient is still hesitant about being transferred.  She understands I will not going to be able to offer her high-dose chemotherapy in our facility.    Primary to make arrangement for transfer  today      3. Hyperuricemia:  S. Uric acid 15.2 yesterday. started Rasburicase and allopurinol  Uric acid is down to 1.7  Continue allopurinol at 200mg TID  Continue to monitor uric acid level    4 ACute on chronic anemia.  Multifactorial    5. Acute hypoxic respiratory insufficiency.  Likely secondary to congestive heart failure, pneumonia.    Cardiology and pulmonology are following.  Currently on 5 L of oxygen nasal cannula and diuresis with Lasix.  -discussed with pulmonology, Recommended against IV steroids given ongoing leukocytosis,    Continue current care          Electronically signed by Meghann Lerma MD, 10/11/24, 5:05 PM EDT.

## 2024-10-10 NOTE — PLAN OF CARE
Goal Outcome Evaluation:         Pt currently resting abed. Pt does c/o pain in which has pain pump. No distress noted. VSS cont plan of care.

## 2024-10-11 LAB
BH BB BLOOD EXPIRATION DATE: NORMAL
BH BB BLOOD TYPE BARCODE: 6200
BH BB DISPENSE STATUS: NORMAL
BH BB PRODUCT CODE: NORMAL
BH BB UNIT NUMBER: NORMAL
CROSSMATCH INTERPRETATION: NORMAL
LAB AP CASE REPORT: NORMAL
LAB AP CLINICAL INFORMATION: NORMAL
LAB AP DIAGNOSIS COMMENT: NORMAL
LAB AP FLOW CYTOMETRY SUMMARY: NORMAL
PATH REPORT.FINAL DX SPEC: NORMAL
PATH REPORT.GROSS SPEC: NORMAL
UNIT  ABO: NORMAL
UNIT  RH: NORMAL

## 2024-10-11 NOTE — CASE MANAGEMENT/SOCIAL WORK
Case Management Discharge Note      Final Note: [facility] for high dose chemo therapy        Selected Continued Care - Discharged on 10/10/2024 Admission date: 9/27/2024 - Discharge disposition: Short Term Hospital (DC - External)          Home Medical Care Coordination complete.      Service Provider Selected Services Address Phone Fax Patient Preferred    [facility] [address] [facility] [place] [address] [phone] --                 Transportation Services  Ambulance: [facility]    Final Discharge Disposition Code: 02 - short term hospital for  care

## 2024-10-14 LAB — Lab: NORMAL

## 2024-10-16 LAB
CYTOGENETICS RESULT: NORMAL
TARGETGENE RESULT: NORMAL

## 2024-10-17 LAB
CCV RESULT: NORMAL
REF LAB TEST METHOD: NORMAL

## 2024-10-18 LAB
LAB AP CASE REPORT: NORMAL
LAB AP CLINICAL INFORMATION: NORMAL
LAB AP CYTOGENETICS REPORT,ADDENDUM: NORMAL
LAB AP DIAGNOSIS COMMENT: NORMAL
LAB AP FLOW CYTOMETRY SUMMARY: NORMAL
Lab: NORMAL
PATH REPORT.FINAL DX SPEC: NORMAL
PATH REPORT.GROSS SPEC: NORMAL

## 2024-10-25 ENCOUNTER — SPECIALTY PHARMACY (OUTPATIENT)
Dept: PHARMACY | Facility: HOSPITAL | Age: 49
End: 2024-10-25
Payer: MEDICARE

## 2024-10-25 LAB
IDH1/IDH2 MUTATION ANALYSIS RESULT: NORMAL
INTELLIGEN MYELOID RESULT: NORMAL

## 2024-10-25 NOTE — PROGRESS NOTES
Specialty Pharmacy Patient Management Program  Program Disenrollment      Nieves Mc is seen by their provider for chronic myelocytic leukemia. Patient was previously enrolled in the Oncology Patient Management program offered by [facility].      Disenrolling patient today due to change transfer of care to [facility] and medication discontinuation.    If treatment plans change, please consult Specialty Pharmacy again.    Ros Martinez  10/25/2024  11:40 EDT

## 2024-10-28 LAB
LAB AP CASE REPORT: NORMAL
LAB AP CLINICAL INFORMATION: NORMAL
LAB AP CYTOGENETICS REPORT,ADDENDUM: NORMAL
LAB AP DIAGNOSIS COMMENT: NORMAL
LAB AP FLOW CYTOMETRY SUMMARY: NORMAL
Lab: NORMAL
Lab: NORMAL
PATH REPORT.FINAL DX SPEC: NORMAL
PATH REPORT.GROSS SPEC: NORMAL

## 2024-10-30 ENCOUNTER — SPECIALTY PHARMACY (OUTPATIENT)
Dept: PHARMACY | Facility: HOSPITAL | Age: 49
End: 2024-10-30
Payer: MEDICARE

## 2024-10-30 NOTE — PROGRESS NOTES
Specialty Pharmacy Note    Aydee from [facility] called because she could not get a hold of [facility] to schedule her Iclusig. I called her home and cell number and both were not in service.     Jyotsna nAton - CARE COORDINATOR  10/30/2024  16:13 EDT

## 2024-10-31 ENCOUNTER — TELEPHONE (OUTPATIENT)
Dept: DIABETES SERVICES | Facility: HOSPITAL | Age: 49
End: 2024-10-31
Payer: MEDICARE

## 2024-11-04 ENCOUNTER — TELEPHONE (OUTPATIENT)
Dept: DIABETES SERVICES | Facility: HOSPITAL | Age: 49
End: 2024-11-04
Payer: MEDICARE

## 2024-11-05 LAB — CCV RESULT: NORMAL

## 2024-11-09 NOTE — PROGRESS NOTES
Enter Query Response Below      Query Response: sepsis, present on admission             If applicable, please update the problem list.     Thank you for your response to the CDI query.   When responding to queries sent by CDI, the preferred method is to respond with a New Note. By responding with a new note, the note will include your query response and signature.  How to Respond to this query:  a. Click New Note  b. Answer query within the yellow box.  c. Update the Problem List, if applicable.    ----- Message -----  From: Brammell, Timothy Duane, MD  Sent: 10/27/2024   1:08 PM EDT  To: Sahil Mendoza RN  Subject: RE: CDI Query                                        Probable gram negative pneumonia     ----- Message -----  From: Sahil Mendoza RN  Sent: 10/17/2024   2:59 PM EDT  To: Timothy Duane Brammell, MD  Subject: CDI Query                                            Patient: Nieves Mc        : 1975  Account: [id number]           Admit Date: 2024                                                    How to Respond to this query:                   a. Click New Note                b. Answer query within the yellow box.                c. Update the Problem List, if applicable.        If you have any questions about this query contact me at: [email or web address]      Dr. Miranda,     Patient with history of chronic CHF and type 1 diabetes presented  for shortness of breath. Notes include pneumonia and acute respiratory failure. Patient treated with monitoring, supplemental oxygen, IV cefepime  - 10/2, IV doxycycline  - 10/1, and Augmentin 10/4 - 10/10.      Lab values :  blood culture x1 with Streptococcus, alpha hemolytic, and blood culture x1 with no growth at 5 days     Please clarify the type of pneumonia the patient was treated/monitored for:     - Gram-negative pneumonia (excluding Haemophilus influenzae)  - Bacterial pneumonia unspecified  - Gram-positive pneumonia  - Other  ___________________  - Unable to clinically determine     By submitting this query, we are merely seeking further clarification of documentation to accurately reflect all conditions that you are monitoring, evaluating, treating or that extend the hospitalization or utilize additional resources of care. Please utilize your independent clinical judgment when addressing the question(s) above.      This query and your response, once completed, will be entered into the legal medical record.     Sincerely,  Sahil Mendoza RN, CDS  Clinical Documentation Integrity Program

## 2025-01-02 RX ORDER — FUROSEMIDE 40 MG/1
40 TABLET ORAL DAILY
Qty: 90 TABLET | OUTPATIENT
Start: 2025-01-02

## 2025-05-11 NOTE — PROGRESS NOTES
[facility] Medicine Services Daily Progress Note    Patient Name: Nieves Mc  : 1975  MRN: [id number]  Primary Care Physician:  Houston Oro MD  Date of admission: 2022      Subjective         Chief Complaint: Shortness of breath        Patient Reports she feels okay post bronchoscopy.  Oxygen turned down to 2 L.  Tachycardia is improving.  Severely hyperglycemic due to steroid use.  Several doses of IV insulin had to be given.  No response glucose over 600 due to steroids.  Moved to PCU for insulin drip.     ROS negative except as above    Objective      Vitals:   Temp:  [97.8 °F (36.6 °C)-100.1 °F (37.8 °C)] 97.9 °F (36.6 °C)  Heart Rate:  [] 100  Resp:  [16-22] 17  BP: ()/(51-73) 126/73  Flow (L/min):  [2] 2    Vitals reviewed.   Constitutional:       Comments: On 2 L today.  Family at bedside.    HENT:      Head: Normocephalic.      Nose: Nose normal.   Cardiovascular:      Rate and Rhythm: Regular rhythm.  Significant tachycardia present.      Pulses: Normal pulses.   Pulmonary:      Effort: Pulmonary effort is normal.   Abdominal:      General: There is ascites.      Comments: Significant splenomegaly   Musculoskeletal:         General: Normal range of motion.      Cervical back: Normal range of motion.   Skin:     General: Skin is warm.      Capillary Refill: Capillary refill takes less than 2 seconds.   Neurological:      General: No focal deficit present.   Psychiatric:         Mood and Affect: Mood normal.        Result Review    Result Review:  I have personally reviewed the results from the time of this admission to 2022 16:11 EDT and agree with these findings:  [x]  Laboratory  []  Microbiology  []  Radiology  []  EKG/Telemetry   []  Cardiology/Vascular   []  Pathology  []  Old records  []  Other:  Most notable findings include: Severe hyperglycemia over 500              Assessment & Plan    46-year-old lady on chemotherapy presents with  sepsis pneumonia shortness of breath tachycardia anemia     Active Hospital Problems:          Active Hospital Problems     Diagnosis     • **CML (chronic myelocytic leukemia) (HCC)     • Tachycardia     • Severe anemia     • Medically noncompliant     • Depression with anxiety        Plan:       CML with associated chronic pain  -WBCs improving  -Per oncology note, currently in chronic phase based on her bone marrow  -She admits noncompliance with treatment (tasigna 300 mg p.o. twice daily) since February 2022.  -Continue home Percocet  -Oncology consulted appreciate assistance      Severe anemia  -Hemoglobin stable today continue to monitor  -CT of the abdomen and pelvis shows no sign of GI bleed  -Monitor H&H  -Type and cross  -1 unit PRBC ordered for transfusion in ED  -Hold home Xarelto for now  -Another unit given June 2  Hemoglobin above 7.5 continue to monitor closely     Diabetes mellitus type 2  -Hemoglobin A1c ordered  -Patient also reports noncompliance with insulin  -Blood glucose 533 on arrival to ED decreased to 477 after receiving 8 units of regular insulin  -Accu-Cheks AC at bedtime  -Continue home NPH  -Continue home gabapentin  -SSI ordered  -Started Lantus 10 units daily  -Severe steroid-induced hyperglycemia.  Patient will be started on insulin drip and moved to PCU        Depression with anxiety  -Chronic, stable  -Continue home alprazolam, Celexa, trazodone     Tachycardia  -EKG reviewed  -Continuous cardiac monitoring  -Continue home metoprolol succinate XL  -CT PE negative on June 4  -Heart rate improving on steroids     Persistent nausea vomiting  Abdominal x-ray normal  Supportive care with Zofran      CT PE shows bilateral opacities pneumonia with possible ARDS versus opportunistic infection.  Echo pending.  Appreciate pulmonary assistance.  Possible transfusion reaction on admission as her symptoms got worse posttransfusion.    Steroids initiated     Status post bronchoscopy on June 6  follow-up results     DVT prophylaxis:  Mechanical DVT prophylaxis orders are present.     CODE STATUS:    Code Status (Patient has no pulse and is not breathing): CPR (Attempt to Resuscitate)  Medical Interventions (Patient has pulse or is breathing): Full Support     Admission Status:  I believe this patient meets inpatient status.     I discussed the patient's findings and my recommendations with patient.     This patient has been examined wearing appropriate Personal Protective Equipment   06/07/22      Electronically signed by Villa Patel MD, 06/07/22, 16:11 EDT.  [facility] Hospitalist Team            Alert and interactive, no focal deficits

## 2025-06-30 NOTE — SIGNIFICANT NOTE
10/01/24 1432   OTHER   Discipline physical therapist   Rehab Time/Intention   Session Not Performed other (see comments)  (Pt receiving blood transfusion at time of attempt. Will follow up tomorrow.)   Recommendation   PT - Next Appointment 10/02/24        Normal vision: sees adequately in most situations; can see medication labels, newsprint

## (undated) DEVICE — BAPTIST FLOYD BRONCHOSCOPY: Brand: MEDLINE INDUSTRIES, INC.